# Patient Record
Sex: FEMALE | Race: WHITE | NOT HISPANIC OR LATINO
[De-identification: names, ages, dates, MRNs, and addresses within clinical notes are randomized per-mention and may not be internally consistent; named-entity substitution may affect disease eponyms.]

---

## 2017-02-16 ENCOUNTER — APPOINTMENT (OUTPATIENT)
Dept: CARDIOLOGY | Facility: CLINIC | Age: 72
End: 2017-02-16

## 2017-02-16 VITALS
DIASTOLIC BLOOD PRESSURE: 74 MMHG | HEIGHT: 67 IN | WEIGHT: 266 LBS | BODY MASS INDEX: 41.75 KG/M2 | SYSTOLIC BLOOD PRESSURE: 118 MMHG | HEART RATE: 89 BPM

## 2017-03-28 ENCOUNTER — APPOINTMENT (OUTPATIENT)
Dept: CARDIOLOGY | Facility: CLINIC | Age: 72
End: 2017-03-28

## 2017-05-16 ENCOUNTER — APPOINTMENT (OUTPATIENT)
Dept: CARDIOLOGY | Facility: CLINIC | Age: 72
End: 2017-05-16

## 2017-05-26 ENCOUNTER — MEDICATION RENEWAL (OUTPATIENT)
Age: 72
End: 2017-05-26

## 2017-07-18 ENCOUNTER — APPOINTMENT (OUTPATIENT)
Dept: CARDIOLOGY | Facility: CLINIC | Age: 72
End: 2017-07-18

## 2017-07-18 VITALS
BODY MASS INDEX: 41.59 KG/M2 | SYSTOLIC BLOOD PRESSURE: 128 MMHG | HEART RATE: 75 BPM | DIASTOLIC BLOOD PRESSURE: 76 MMHG | WEIGHT: 265 LBS | HEIGHT: 67 IN

## 2017-10-06 ENCOUNTER — OUTPATIENT (OUTPATIENT)
Dept: OUTPATIENT SERVICES | Facility: HOSPITAL | Age: 72
LOS: 1 days | Discharge: HOME | End: 2017-10-06

## 2017-10-06 DIAGNOSIS — Z13.21 ENCOUNTER FOR SCREENING FOR NUTRITIONAL DISORDER: ICD-10-CM

## 2017-10-06 DIAGNOSIS — R74.0 NONSPECIFIC ELEVATION OF LEVELS OF TRANSAMINASE AND LACTIC ACID DEHYDROGENASE [LDH]: ICD-10-CM

## 2017-10-06 DIAGNOSIS — R07.9 CHEST PAIN, UNSPECIFIED: ICD-10-CM

## 2017-10-06 DIAGNOSIS — E05.90 THYROTOXICOSIS, UNSPECIFIED WITHOUT THYROTOXIC CRISIS OR STORM: ICD-10-CM

## 2017-10-06 DIAGNOSIS — E11.9 TYPE 2 DIABETES MELLITUS WITHOUT COMPLICATIONS: ICD-10-CM

## 2017-10-06 DIAGNOSIS — E78.00 PURE HYPERCHOLESTEROLEMIA, UNSPECIFIED: ICD-10-CM

## 2017-10-06 DIAGNOSIS — R10.12 LEFT UPPER QUADRANT PAIN: ICD-10-CM

## 2017-10-06 DIAGNOSIS — Z00.00 ENCOUNTER FOR GENERAL ADULT MEDICAL EXAMINATION WITHOUT ABNORMAL FINDINGS: ICD-10-CM

## 2017-10-06 DIAGNOSIS — G93.3 POSTVIRAL AND RELATED FATIGUE SYNDROMES: ICD-10-CM

## 2017-10-06 DIAGNOSIS — D64.9 ANEMIA, UNSPECIFIED: ICD-10-CM

## 2017-10-06 DIAGNOSIS — D51.0 VITAMIN B12 DEFICIENCY ANEMIA DUE TO INTRINSIC FACTOR DEFICIENCY: ICD-10-CM

## 2017-10-06 DIAGNOSIS — R10.9 UNSPECIFIED ABDOMINAL PAIN: ICD-10-CM

## 2017-10-06 DIAGNOSIS — E55.9 VITAMIN D DEFICIENCY, UNSPECIFIED: ICD-10-CM

## 2018-01-11 ENCOUNTER — APPOINTMENT (OUTPATIENT)
Dept: CARDIOLOGY | Facility: CLINIC | Age: 73
End: 2018-01-11

## 2018-03-27 ENCOUNTER — OUTPATIENT (OUTPATIENT)
Dept: OUTPATIENT SERVICES | Facility: HOSPITAL | Age: 73
LOS: 1 days | Discharge: HOME | End: 2018-03-27

## 2018-03-27 DIAGNOSIS — D47.2 MONOCLONAL GAMMOPATHY: ICD-10-CM

## 2018-03-27 DIAGNOSIS — D64.9 ANEMIA, UNSPECIFIED: ICD-10-CM

## 2018-03-27 DIAGNOSIS — N18.4 CHRONIC KIDNEY DISEASE, STAGE 4 (SEVERE): ICD-10-CM

## 2018-03-27 DIAGNOSIS — R80.1 PERSISTENT PROTEINURIA, UNSPECIFIED: ICD-10-CM

## 2018-05-24 ENCOUNTER — APPOINTMENT (OUTPATIENT)
Dept: CARDIOLOGY | Facility: CLINIC | Age: 73
End: 2018-05-24

## 2018-07-12 ENCOUNTER — APPOINTMENT (OUTPATIENT)
Dept: CARDIOLOGY | Facility: CLINIC | Age: 73
End: 2018-07-12

## 2018-08-15 ENCOUNTER — APPOINTMENT (OUTPATIENT)
Dept: CARDIOLOGY | Facility: CLINIC | Age: 73
End: 2018-08-15

## 2018-08-15 VITALS
HEIGHT: 67 IN | BODY MASS INDEX: 42.69 KG/M2 | HEART RATE: 84 BPM | DIASTOLIC BLOOD PRESSURE: 82 MMHG | WEIGHT: 272 LBS | SYSTOLIC BLOOD PRESSURE: 140 MMHG

## 2018-12-13 ENCOUNTER — APPOINTMENT (OUTPATIENT)
Dept: CARDIOLOGY | Facility: CLINIC | Age: 73
End: 2018-12-13

## 2019-03-22 ENCOUNTER — OUTPATIENT (OUTPATIENT)
Dept: OUTPATIENT SERVICES | Facility: HOSPITAL | Age: 74
LOS: 1 days | Discharge: HOME | End: 2019-03-22

## 2019-03-22 DIAGNOSIS — D64.9 ANEMIA, UNSPECIFIED: ICD-10-CM

## 2019-03-22 DIAGNOSIS — D50.9 IRON DEFICIENCY ANEMIA, UNSPECIFIED: ICD-10-CM

## 2019-03-25 ENCOUNTER — OUTPATIENT (OUTPATIENT)
Dept: OUTPATIENT SERVICES | Facility: HOSPITAL | Age: 74
LOS: 1 days | Discharge: HOME | End: 2019-03-25

## 2019-03-25 DIAGNOSIS — E87.5 HYPERKALEMIA: ICD-10-CM

## 2019-04-02 ENCOUNTER — OUTPATIENT (OUTPATIENT)
Dept: OUTPATIENT SERVICES | Facility: HOSPITAL | Age: 74
LOS: 1 days | Discharge: HOME | End: 2019-04-02

## 2019-04-02 DIAGNOSIS — N18.2 CHRONIC KIDNEY DISEASE, STAGE 2 (MILD): ICD-10-CM

## 2019-04-09 ENCOUNTER — OUTPATIENT (OUTPATIENT)
Dept: OUTPATIENT SERVICES | Facility: HOSPITAL | Age: 74
LOS: 1 days | Discharge: HOME | End: 2019-04-09

## 2019-04-09 DIAGNOSIS — N18.2 CHRONIC KIDNEY DISEASE, STAGE 2 (MILD): ICD-10-CM

## 2019-04-23 ENCOUNTER — OUTPATIENT (OUTPATIENT)
Dept: OUTPATIENT SERVICES | Facility: HOSPITAL | Age: 74
LOS: 1 days | Discharge: HOME | End: 2019-04-23

## 2019-04-23 DIAGNOSIS — N18.2 CHRONIC KIDNEY DISEASE, STAGE 2 (MILD): ICD-10-CM

## 2019-05-21 ENCOUNTER — OUTPATIENT (OUTPATIENT)
Dept: OUTPATIENT SERVICES | Facility: HOSPITAL | Age: 74
LOS: 1 days | Discharge: HOME | End: 2019-05-21

## 2019-05-21 DIAGNOSIS — R79.82 ELEVATED C-REACTIVE PROTEIN (CRP): ICD-10-CM

## 2019-05-21 DIAGNOSIS — R74.8 ABNORMAL LEVELS OF OTHER SERUM ENZYMES: ICD-10-CM

## 2019-09-01 ENCOUNTER — EMERGENCY (EMERGENCY)
Facility: HOSPITAL | Age: 74
LOS: 0 days | Discharge: HOME | End: 2019-09-01
Attending: EMERGENCY MEDICINE | Admitting: EMERGENCY MEDICINE
Payer: MEDICARE

## 2019-09-01 VITALS
DIASTOLIC BLOOD PRESSURE: 68 MMHG | OXYGEN SATURATION: 99 % | TEMPERATURE: 96 F | RESPIRATION RATE: 18 BRPM | HEIGHT: 67 IN | SYSTOLIC BLOOD PRESSURE: 159 MMHG | HEART RATE: 73 BPM | WEIGHT: 220.02 LBS

## 2019-09-01 VITALS
TEMPERATURE: 96 F | DIASTOLIC BLOOD PRESSURE: 74 MMHG | HEART RATE: 82 BPM | RESPIRATION RATE: 18 BRPM | OXYGEN SATURATION: 99 % | SYSTOLIC BLOOD PRESSURE: 148 MMHG

## 2019-09-01 DIAGNOSIS — R10.11 RIGHT UPPER QUADRANT PAIN: ICD-10-CM

## 2019-09-01 DIAGNOSIS — Z91.040 LATEX ALLERGY STATUS: ICD-10-CM

## 2019-09-01 DIAGNOSIS — Z87.19 PERSONAL HISTORY OF OTHER DISEASES OF THE DIGESTIVE SYSTEM: Chronic | ICD-10-CM

## 2019-09-01 DIAGNOSIS — I10 ESSENTIAL (PRIMARY) HYPERTENSION: ICD-10-CM

## 2019-09-01 DIAGNOSIS — R07.9 CHEST PAIN, UNSPECIFIED: ICD-10-CM

## 2019-09-01 DIAGNOSIS — Z88.0 ALLERGY STATUS TO PENICILLIN: ICD-10-CM

## 2019-09-01 DIAGNOSIS — R10.13 EPIGASTRIC PAIN: ICD-10-CM

## 2019-09-01 LAB
ALBUMIN SERPL ELPH-MCNC: 3.9 G/DL — SIGNIFICANT CHANGE UP (ref 3.5–5.2)
ALP SERPL-CCNC: 89 U/L — SIGNIFICANT CHANGE UP (ref 30–115)
ALT FLD-CCNC: 9 U/L — SIGNIFICANT CHANGE UP (ref 0–41)
ANION GAP SERPL CALC-SCNC: 11 MMOL/L — SIGNIFICANT CHANGE UP (ref 7–14)
APTT BLD: 35 SEC — SIGNIFICANT CHANGE UP (ref 27–39.2)
AST SERPL-CCNC: 20 U/L — SIGNIFICANT CHANGE UP (ref 0–41)
BASOPHILS # BLD AUTO: 0.01 K/UL — SIGNIFICANT CHANGE UP (ref 0–0.2)
BASOPHILS NFR BLD AUTO: 0.2 % — SIGNIFICANT CHANGE UP (ref 0–1)
BILIRUB SERPL-MCNC: 0.5 MG/DL — SIGNIFICANT CHANGE UP (ref 0.2–1.2)
BUN SERPL-MCNC: 26 MG/DL — HIGH (ref 10–20)
CALCIUM SERPL-MCNC: 9.1 MG/DL — SIGNIFICANT CHANGE UP (ref 8.5–10.1)
CHLORIDE SERPL-SCNC: 108 MMOL/L — SIGNIFICANT CHANGE UP (ref 98–110)
CO2 SERPL-SCNC: 22 MMOL/L — SIGNIFICANT CHANGE UP (ref 17–32)
CREAT SERPL-MCNC: 1.8 MG/DL — HIGH (ref 0.7–1.5)
EOSINOPHIL # BLD AUTO: 0.21 K/UL — SIGNIFICANT CHANGE UP (ref 0–0.7)
EOSINOPHIL NFR BLD AUTO: 5.2 % — SIGNIFICANT CHANGE UP (ref 0–8)
GLUCOSE SERPL-MCNC: 125 MG/DL — HIGH (ref 70–99)
HCT VFR BLD CALC: 36.1 % — LOW (ref 37–47)
HGB BLD-MCNC: 11.2 G/DL — LOW (ref 12–16)
IMM GRANULOCYTES NFR BLD AUTO: 0.2 % — SIGNIFICANT CHANGE UP (ref 0.1–0.3)
INR BLD: 1.06 RATIO — SIGNIFICANT CHANGE UP (ref 0.65–1.3)
LIDOCAIN IGE QN: 60 U/L — SIGNIFICANT CHANGE UP (ref 7–60)
LYMPHOCYTES # BLD AUTO: 1.01 K/UL — LOW (ref 1.2–3.4)
LYMPHOCYTES # BLD AUTO: 24.9 % — SIGNIFICANT CHANGE UP (ref 20.5–51.1)
MAGNESIUM SERPL-MCNC: 1.9 MG/DL — SIGNIFICANT CHANGE UP (ref 1.8–2.4)
MCHC RBC-ENTMCNC: 25.5 PG — LOW (ref 27–31)
MCHC RBC-ENTMCNC: 31 G/DL — LOW (ref 32–37)
MCV RBC AUTO: 82 FL — SIGNIFICANT CHANGE UP (ref 81–99)
MONOCYTES # BLD AUTO: 0.49 K/UL — SIGNIFICANT CHANGE UP (ref 0.1–0.6)
MONOCYTES NFR BLD AUTO: 12.1 % — HIGH (ref 1.7–9.3)
NEUTROPHILS # BLD AUTO: 2.33 K/UL — SIGNIFICANT CHANGE UP (ref 1.4–6.5)
NEUTROPHILS NFR BLD AUTO: 57.4 % — SIGNIFICANT CHANGE UP (ref 42.2–75.2)
NRBC # BLD: 0 /100 WBCS — SIGNIFICANT CHANGE UP (ref 0–0)
PLATELET # BLD AUTO: 136 K/UL — SIGNIFICANT CHANGE UP (ref 130–400)
POTASSIUM SERPL-MCNC: 4.6 MMOL/L — SIGNIFICANT CHANGE UP (ref 3.5–5)
POTASSIUM SERPL-SCNC: 4.6 MMOL/L — SIGNIFICANT CHANGE UP (ref 3.5–5)
PROT SERPL-MCNC: 6.8 G/DL — SIGNIFICANT CHANGE UP (ref 6–8)
PROTHROM AB SERPL-ACNC: 12.2 SEC — SIGNIFICANT CHANGE UP (ref 9.95–12.87)
RBC # BLD: 4.4 M/UL — SIGNIFICANT CHANGE UP (ref 4.2–5.4)
RBC # FLD: 14.9 % — HIGH (ref 11.5–14.5)
SODIUM SERPL-SCNC: 141 MMOL/L — SIGNIFICANT CHANGE UP (ref 135–146)
TROPONIN T SERPL-MCNC: <0.01 NG/ML — SIGNIFICANT CHANGE UP
WBC # BLD: 4.06 K/UL — LOW (ref 4.8–10.8)
WBC # FLD AUTO: 4.06 K/UL — LOW (ref 4.8–10.8)

## 2019-09-01 PROCEDURE — 71045 X-RAY EXAM CHEST 1 VIEW: CPT | Mod: 26

## 2019-09-01 PROCEDURE — 99284 EMERGENCY DEPT VISIT MOD MDM: CPT

## 2019-09-01 RX ORDER — ACETAMINOPHEN 500 MG
975 TABLET ORAL ONCE
Refills: 0 | Status: COMPLETED | OUTPATIENT
Start: 2019-09-01 | End: 2019-09-01

## 2019-09-01 RX ADMIN — Medication 975 MILLIGRAM(S): at 14:03

## 2019-09-01 NOTE — ED ADULT TRIAGE NOTE - CHIEF COMPLAINT QUOTE
pt c/o midsternal CP and epigastric pain for days, denies sob, denies n/v, describes pain as "cramping/pressure". pt reports waking up diaphoretic.

## 2019-09-01 NOTE — ED PROVIDER NOTE - PROVIDER TOKENS
PROVIDER:[TOKEN:[[id number]],FOLLOWUP:[1-3 Days]],PROVIDER:[TOKEN:[[id number]],FOLLOWUP:[1-3 Days]]

## 2019-09-01 NOTE — ED PROVIDER NOTE - PATIENT PORTAL LINK FT
You can access the FollowMyHealth Patient Portal offered by [facility] by registering at the following website: [email or web address]. By joining [employer]’s FollowMyHealth portal, you will also be able to view your health information using other applications (apps) compatible with our system.

## 2019-09-01 NOTE — ED PROVIDER NOTE - ATTENDING CONTRIBUTION TO CARE
Pt is a 74yo female with 1 month of epigastric tightness that is random and episodic, unrelated to exertion or diet.  No radiation.  No SOB or diaphoresis.  No LE edema or calf pain.  Had endoscopy 2 weeks ago.      Exam: RRR, CTAB, 2+ raidal pulses, soft NT abdomen, no rash, no LE edema, no calf tenderenss  Imp: epigastric discomfort  Plan: labs, xr, ekg, cardio/gi f/u

## 2019-09-01 NOTE — ED PROVIDER NOTE - OBJECTIVE STATEMENT
74 y/o female presents with epigastric pain radiating to jaw intermittent x 2 weeks. patient s/p endoscopy 2 weeks ago. patient was told to continue prilosec. patient follows up with cardiology 3 months ago. patient former smoker. patient denies any sob, syncope, back pain, upper chest pain, arm radiation. patient c/o gnawing pain not worsened by eating or lying back. patient without any leg edema or swelling.

## 2019-09-01 NOTE — ED ADULT NURSE NOTE - NSIMPLEMENTINTERV_GEN_ALL_ED
Implemented All Universal Safety Interventions:  [place] to call system. Call bell, personal items and telephone within reach. Instruct patient to call for assistance. Room bathroom lighting operational. Non-slip footwear when patient is off stretcher. Physically safe environment: no spills, clutter or unnecessary equipment. Stretcher in lowest position, wheels locked, appropriate side rails in place.

## 2019-09-01 NOTE — ED PROVIDER NOTE - CARE PROVIDER_API CALL
Munir Paula (MD)  Cardiovascular Disease; Internal Medicine; Interventional Cardiology  [address] [place] [address], [place] [address]  Phone: [phone]  Fax: [phone]  Follow Up Time: 1-3 Days    Prudencio Robles ()  Gastroenterology  [address] [place] [address]  Phone: [phone]  Fax: [phone]  Follow Up Time: 1-3 Days

## 2019-10-18 ENCOUNTER — INPATIENT (INPATIENT)
Facility: HOSPITAL | Age: 74
LOS: 3 days | Discharge: HOME | End: 2019-10-22
Attending: INTERNAL MEDICINE | Admitting: INTERNAL MEDICINE
Payer: MEDICARE

## 2019-10-18 VITALS
SYSTOLIC BLOOD PRESSURE: 144 MMHG | RESPIRATION RATE: 18 BRPM | HEART RATE: 89 BPM | DIASTOLIC BLOOD PRESSURE: 59 MMHG | OXYGEN SATURATION: 100 % | TEMPERATURE: 97 F

## 2019-10-18 DIAGNOSIS — Z87.19 PERSONAL HISTORY OF OTHER DISEASES OF THE DIGESTIVE SYSTEM: Chronic | ICD-10-CM

## 2019-10-18 PROBLEM — I10 ESSENTIAL (PRIMARY) HYPERTENSION: Chronic | Status: ACTIVE | Noted: 2019-09-01

## 2019-10-18 PROBLEM — L30.9 DERMATITIS, UNSPECIFIED: Chronic | Status: ACTIVE | Noted: 2019-09-01

## 2019-10-18 PROBLEM — R01.1 CARDIAC MURMUR, UNSPECIFIED: Chronic | Status: ACTIVE | Noted: 2019-09-01

## 2019-10-18 LAB
ABO RH CONFIRMATION: SIGNIFICANT CHANGE UP
ALBUMIN SERPL ELPH-MCNC: 3.8 G/DL — SIGNIFICANT CHANGE UP (ref 3.5–5.2)
ALP SERPL-CCNC: 90 U/L — SIGNIFICANT CHANGE UP (ref 30–115)
ALT FLD-CCNC: 6 U/L — SIGNIFICANT CHANGE UP (ref 0–41)
ANION GAP SERPL CALC-SCNC: 11 MMOL/L — SIGNIFICANT CHANGE UP (ref 7–14)
APTT BLD: 33.3 SEC — SIGNIFICANT CHANGE UP (ref 27–39.2)
AST SERPL-CCNC: 17 U/L — SIGNIFICANT CHANGE UP (ref 0–41)
BASE EXCESS BLDV CALC-SCNC: -3.5 MMOL/L — LOW (ref -2–2)
BASOPHILS # BLD AUTO: 0.01 K/UL — SIGNIFICANT CHANGE UP (ref 0–0.2)
BASOPHILS NFR BLD AUTO: 0.4 % — SIGNIFICANT CHANGE UP (ref 0–1)
BILIRUB DIRECT SERPL-MCNC: <0.2 MG/DL — SIGNIFICANT CHANGE UP (ref 0–0.2)
BILIRUB INDIRECT FLD-MCNC: >0.3 MG/DL — SIGNIFICANT CHANGE UP (ref 0.2–1.2)
BILIRUB SERPL-MCNC: 0.5 MG/DL — SIGNIFICANT CHANGE UP (ref 0.2–1.2)
BLD GP AB SCN SERPL QL: SIGNIFICANT CHANGE UP
BUN SERPL-MCNC: 30 MG/DL — HIGH (ref 10–20)
CA-I SERPL-SCNC: 1.19 MMOL/L — SIGNIFICANT CHANGE UP (ref 1.12–1.3)
CALCIUM SERPL-MCNC: 8.5 MG/DL — SIGNIFICANT CHANGE UP (ref 8.5–10.1)
CHLORIDE SERPL-SCNC: 109 MMOL/L — SIGNIFICANT CHANGE UP (ref 98–110)
CO2 SERPL-SCNC: 20 MMOL/L — SIGNIFICANT CHANGE UP (ref 17–32)
CREAT SERPL-MCNC: 2 MG/DL — HIGH (ref 0.7–1.5)
EOSINOPHIL # BLD AUTO: 0.05 K/UL — SIGNIFICANT CHANGE UP (ref 0–0.7)
EOSINOPHIL NFR BLD AUTO: 1.9 % — SIGNIFICANT CHANGE UP (ref 0–8)
GAS PNL BLDV: 141 MMOL/L — SIGNIFICANT CHANGE UP (ref 136–145)
GAS PNL BLDV: SIGNIFICANT CHANGE UP
GLUCOSE BLDC GLUCOMTR-MCNC: 78 MG/DL — SIGNIFICANT CHANGE UP (ref 70–99)
GLUCOSE SERPL-MCNC: 101 MG/DL — HIGH (ref 70–99)
HCO3 BLDV-SCNC: 21 MMOL/L — LOW (ref 22–29)
HCT VFR BLD CALC: 18.2 % — LOW (ref 37–47)
HCT VFR BLDA CALC: 16.2 % — LOW (ref 34–44)
HGB BLD CALC-MCNC: 5.3 G/DL — LOW (ref 14–18)
HGB BLD-MCNC: 5.5 G/DL — CRITICAL LOW (ref 12–16)
IMM GRANULOCYTES NFR BLD AUTO: 0.4 % — HIGH (ref 0.1–0.3)
INR BLD: 1.03 RATIO — SIGNIFICANT CHANGE UP (ref 0.65–1.3)
INR BLD: 1.1 RATIO — SIGNIFICANT CHANGE UP (ref 0.65–1.3)
LACTATE BLDV-MCNC: 1.4 MMOL/L — SIGNIFICANT CHANGE UP (ref 0.5–1.6)
LACTATE SERPL-SCNC: 1.6 MMOL/L — SIGNIFICANT CHANGE UP (ref 0.5–2.2)
LIDOCAIN IGE QN: 56 U/L — SIGNIFICANT CHANGE UP (ref 7–60)
LYMPHOCYTES # BLD AUTO: 0.5 K/UL — LOW (ref 1.2–3.4)
LYMPHOCYTES # BLD AUTO: 18.7 % — LOW (ref 20.5–51.1)
MCHC RBC-ENTMCNC: 24.4 PG — LOW (ref 27–31)
MCHC RBC-ENTMCNC: 30.2 G/DL — LOW (ref 32–37)
MCV RBC AUTO: 80.9 FL — LOW (ref 81–99)
MONOCYTES # BLD AUTO: 0.17 K/UL — SIGNIFICANT CHANGE UP (ref 0.1–0.6)
MONOCYTES NFR BLD AUTO: 6.4 % — SIGNIFICANT CHANGE UP (ref 1.7–9.3)
NEUTROPHILS # BLD AUTO: 1.93 K/UL — SIGNIFICANT CHANGE UP (ref 1.4–6.5)
NEUTROPHILS NFR BLD AUTO: 72.2 % — SIGNIFICANT CHANGE UP (ref 42.2–75.2)
NRBC # BLD: 0 /100 WBCS — SIGNIFICANT CHANGE UP (ref 0–0)
NT-PROBNP SERPL-SCNC: 1017 PG/ML — HIGH (ref 0–300)
PCO2 BLDV: 37 MMHG — LOW (ref 41–51)
PH BLDV: 7.37 — SIGNIFICANT CHANGE UP (ref 7.26–7.43)
PLATELET # BLD AUTO: 148 K/UL — SIGNIFICANT CHANGE UP (ref 130–400)
PO2 BLDV: 34 MMHG — SIGNIFICANT CHANGE UP (ref 20–40)
POTASSIUM BLDV-SCNC: 5.2 MMOL/L — SIGNIFICANT CHANGE UP (ref 3.3–5.6)
POTASSIUM SERPL-MCNC: 5.4 MMOL/L — HIGH (ref 3.5–5)
POTASSIUM SERPL-SCNC: 5.4 MMOL/L — HIGH (ref 3.5–5)
PROT SERPL-MCNC: 6.9 G/DL — SIGNIFICANT CHANGE UP (ref 6–8)
PROTHROM AB SERPL-ACNC: 11.8 SEC — SIGNIFICANT CHANGE UP (ref 9.95–12.87)
PROTHROM AB SERPL-ACNC: 12.6 SEC — SIGNIFICANT CHANGE UP (ref 9.95–12.87)
RBC # BLD: 2.25 M/UL — LOW (ref 4.2–5.4)
RBC # FLD: 15.4 % — HIGH (ref 11.5–14.5)
SAO2 % BLDV: 63 % — SIGNIFICANT CHANGE UP
SODIUM SERPL-SCNC: 140 MMOL/L — SIGNIFICANT CHANGE UP (ref 135–146)
TROPONIN T SERPL-MCNC: <0.01 NG/ML — SIGNIFICANT CHANGE UP
WBC # BLD: 4.54 K/UL — LOW (ref 4.8–10.8)
WBC # FLD AUTO: 4.54 K/UL — LOW (ref 4.8–10.8)

## 2019-10-18 PROCEDURE — 93010 ELECTROCARDIOGRAM REPORT: CPT

## 2019-10-18 PROCEDURE — 93970 EXTREMITY STUDY: CPT | Mod: 26

## 2019-10-18 PROCEDURE — 99285 EMERGENCY DEPT VISIT HI MDM: CPT

## 2019-10-18 PROCEDURE — 71045 X-RAY EXAM CHEST 1 VIEW: CPT | Mod: 26

## 2019-10-18 RX ORDER — PANTOPRAZOLE SODIUM 20 MG/1
80 TABLET, DELAYED RELEASE ORAL ONCE
Refills: 0 | Status: COMPLETED | OUTPATIENT
Start: 2019-10-18 | End: 2019-10-18

## 2019-10-18 RX ORDER — FAMOTIDINE 10 MG/ML
20 INJECTION INTRAVENOUS ONCE
Refills: 0 | Status: COMPLETED | OUTPATIENT
Start: 2019-10-18 | End: 2019-10-18

## 2019-10-18 RX ORDER — DEXTROSE 50 % IN WATER 50 %
50 SYRINGE (ML) INTRAVENOUS ONCE
Refills: 0 | Status: COMPLETED | OUTPATIENT
Start: 2019-10-18 | End: 2019-10-18

## 2019-10-18 RX ORDER — METOPROLOL TARTRATE 50 MG
25 TABLET ORAL DAILY
Refills: 0 | Status: DISCONTINUED | OUTPATIENT
Start: 2019-10-18 | End: 2019-10-22

## 2019-10-18 RX ORDER — ACETAMINOPHEN 500 MG
650 TABLET ORAL ONCE
Refills: 0 | Status: COMPLETED | OUTPATIENT
Start: 2019-10-18 | End: 2019-10-18

## 2019-10-18 RX ORDER — INSULIN HUMAN 100 [IU]/ML
10 INJECTION, SOLUTION SUBCUTANEOUS ONCE
Refills: 0 | Status: COMPLETED | OUTPATIENT
Start: 2019-10-18 | End: 2019-10-18

## 2019-10-18 RX ORDER — PANTOPRAZOLE SODIUM 20 MG/1
8 TABLET, DELAYED RELEASE ORAL
Qty: 80 | Refills: 0 | Status: DISCONTINUED | OUTPATIENT
Start: 2019-10-18 | End: 2019-10-20

## 2019-10-18 RX ADMIN — INSULIN HUMAN 10 UNIT(S): 100 INJECTION, SOLUTION SUBCUTANEOUS at 22:24

## 2019-10-18 RX ADMIN — PANTOPRAZOLE SODIUM 80 MILLIGRAM(S): 20 TABLET, DELAYED RELEASE ORAL at 18:10

## 2019-10-18 RX ADMIN — PANTOPRAZOLE SODIUM 10 MG/HR: 20 TABLET, DELAYED RELEASE ORAL at 18:10

## 2019-10-18 RX ADMIN — Medication 50 MILLILITER(S): at 22:24

## 2019-10-18 RX ADMIN — Medication 650 MILLIGRAM(S): at 15:22

## 2019-10-18 RX ADMIN — FAMOTIDINE 20 MILLIGRAM(S): 10 INJECTION INTRAVENOUS at 15:22

## 2019-10-18 NOTE — ED PROVIDER NOTE - OBJECTIVE STATEMENT
Patient is a 74 yo F w/ hx of gout, anemia, rheumatoid arthritis, PUD, HTN, Aortic stenosis, CAD p/w SOB. Patient had few episodes of BRBPR followed by dark stools four days prior to presentation after using colchicine for gout flair; frequently uses prednisone for arthritis. Patient states she has had exertional SOB, also orthopnea x 5 days associated with epigastric burning pain radiating up her chest and to her back- sensation is constant. Endorses RLE swelling and pain- no hx of blood clots. Patient referred to ED by PMD for possible GI bleed. Patient's GI is Dr. Robles. No recent travel; no recent surgery.

## 2019-10-18 NOTE — ED ADULT NURSE NOTE - NSIMPLEMENTINTERV_GEN_ALL_ED
Implemented All Universal Safety Interventions:  [place] to call system. Call bell, personal items and telephone within reach. Instruct patient to call for assistance. Room bathroom lighting operational. Non-slip footwear when patient is off stretcher. Physically safe environment: no spills, clutter or unnecessary equipment. Stretcher in lowest position, wheels locked, appropriate side rails in place.

## 2019-10-18 NOTE — ED PROVIDER NOTE - PROGRESS NOTE DETAILS
ATTENDING NOTE: I personally evaluated the patient. I reviewed the Resident’s note (as assigned above), and agree with the findings and plan except as documented in my note.   72 y/o M with PMH of gout on Cortisol and occasional Prednisone sent in for exertional dyspnea and black stool, rule our GI bleed. Pt additionally c/o R LE swelling x 10 days.  Pt non-toxic well appearing, pallor, MMM. No respiratory distress. Lungs CTAB. RRR. Abdomen soft. Black stool noted on rectal exam. R calf tense and tender, trace edema.   Labs imagine reassess. Authored by Dr. Jensen: s/o to dr vieira - f/u labs, imaging and dispo GI fellow and Dr. Cartagena bedside; will admit patient to floor for serial CBC. Possible endoscopy inpatient.  Patient consented for transfusion. Prelim read for DVT study is negative.

## 2019-10-18 NOTE — H&P ADULT - HISTORY OF PRESENT ILLNESS
74 Y/O F w/ hx of gout, anemia, rheumatoid arthritis, PUD, HTN, Aortic stenosis, CAD p/w SOB. Patient had few episodes of BRBPR followed by dark stools four days prior to presentation after using colchicine for gout flair; frequently uses prednisone for arthritis. Patient states she has had exertional SOB, also orthopnea x 5 days associated with epigastric burning pain radiating up her chest and to her back- sensation is constant. Endorses RLE swelling and pain- no hx of blood clots. Patient referred to ED by PMD for possible GI bleed. Patient's GI is Dr. Robels. No recent travel; no recent surgery. 74 Y/O F w/ hx of gout, anemia, rheumatoid arthritis, PUD, HTN, Aortic stenosis, CAD p/w SOB. Patient had few episodes of BRBPR followed by dark stools four days prior to presentation after using colchicine for gout flair; frequently uses prednisone for arthritis. Patient states she has had exertional SOB, also orthopnea x 5 days associated with epigastric burning pain radiating up her chest and to her back- sensation is constant. Endorses RLE swelling and pain- no hx of blood clots. Patient referred to ED by PMD for possible GI bleed. Patient's GI is Dr. Robles. No recent travel; no recent surgery.    In the ED, CXR was done which was negative for any cardiopulmonary disease. Troponin negative. Her HB dropped to 5.5, GI was consulted and patient received 3 units of PRBC.  Patient was started on clear liquid diet.    On my examination patient is resting comfortably with no active distress. 72 Y/O F w/ hx of gout, anemia, rheumatoid arthritis, PUD, HTN, Aortic stenosis, CAD p/w SOB. Patient had few episodes of BRBPR followed by dark stools four days prior to presentation after using colchicine for gout flair; frequently uses prednisone for arthritis. Patient states she has had exertional SOB, also orthopnea x 5 days associated with epigastric burning pain radiating up her chest and to her back- sensation is constant. Endorses RLE swelling and pain- no hx of blood clots. Patient referred to ED by PMD for possible GI bleed. Patient's GI is Dr. Robles. No recent travel; no recent surgery.    In the ED, CXR was done which was negative for any cardiopulmonary disease. Troponin negative. Her HB dropped to 5.5, GI was consulted and patient received 3 units of PRBC.  Patient was started on clear liquid diet and Protonix drip    On my examination patient is resting comfortably with no active distress. 74 Y/O F w/ hx of gout, anemia, rheumatoid arthritis, PUD, HTN, Aortic stenosis, CAD p/w SOB after few episodes of BRBPR followed by dark stools four days prior to presentation Patient states she has had exertional SOB, also orthopnea x 5 days associated with epigastric burning pain radiating up her chest and to her back- sensation is constant. Endorses RLE swelling and pain- no hx of blood clots. Patient referred to ED by PMD for possible GI bleed. Patient's GI is Dr. Robles. No recent travel; no recent surgery.    In the ED, CXR was done which was negative for any cardiopulmonary disease. Troponin negative. Her HB dropped to 5.5, GI was consulted and 3 units of PRBC. were ordered.  Patient was started on clear liquid diet and Protonix drip    On my examination patient is resting comfortably with no active distress. 72 Y/O F w/ hx of gout, anemia, rheumatoid arthritis, PUD, HTN, Aortic stenosis, CAD p/w SOB started 5 days ago after few episodes of BRBPR followed by dark stools four days prior to presentation Patient states she has had exertional SOB, also orthopnea x 5 days associated with epigastric burning pain radiating up her chest and to her back- sensation is constant. Endorses RLE swelling and pain- no hx of blood clots. Patient referred to ED by PMD for possible GI bleed. Patient's GI is Dr. Robles. No recent travel; no recent surgery.    In the ED, CXR was done which was negative for any cardiopulmonary disease. Troponin negative. Her HB dropped to 5.5, GI was consulted and 3 units of PRBC. were ordered.  Patient was started on clear liquid diet and Protonix drip    On my examination patient is resting comfortably with no active distress.

## 2019-10-18 NOTE — H&P ADULT - ATTENDING COMMENTS
A 72 yo female with PMH of HTN, CAD, rheumatoid arthritis, PUD, Aortic stenosis came to ED c/o SOB and dark stool, symptoms started 5 days ago with bright red stool then she noticed black stool and started with SOB and orthopnea, she denies chest pain, cough, fever or chills, she has no abdominal pain, no diarrhea.  In the ED, vital signs were stable, Labs showed Hb 5.4, CXR was clear, she received 3 RBCs and admitted to the hospital. Patient was started on clear liquid diet and Protonix drip    PHYSICAL EXAM:  GENERAL: NAD, well-developed  HEAD:  Atraumatic, Normocephalic  EYES: EOMI, PERRLA, conjunctiva and sclera clear  NECK: Supple, No JVD  CHEST/LUNG: Clear to auscultation bilaterally; No wheeze  HEART: Regular rate and rhythm; S1 S2  ABDOMEN: Soft, Nontender, Nondistended; Bowel sounds present  EXTREMITIES:  2+ Peripheral Pulses, No clubbing, cyanosis, or edema  PSYCH: AAOx3  NEUROLOGY: non-focal  SKIN: No rashes or lesions      A/P:   Acute blood loss anemia, likely from GI bleeding  Hb 5.5, baseline 11  s/p 3 RBCs transfusion, hemodynamically stable, monitor H/H every 12 hrs.     Gastrointestinal bleeding: likely upper.   Hx of PUD, chronic use of steroid for RA, likely another peptic ulcer disease  GI on board, plan for endoscopy on Monday or earlier if patient unstable.   Continue Pantoprazole infusion.    SOB: likely from acute anemia  Hx of AS, Pro-BNP mildly elevated  Caution with IV fluid and blood transfusion.     HTN:   Continue Metoprolol    CKD stage 3:   Cr 2.0 around baseline 1.8

## 2019-10-18 NOTE — ED PROVIDER NOTE - NS ED ROS FT
Constitutional: No fevers.   Eyes:  No visual changes, eye pain or discharge.  ENMT:  No hearing changes, pain, no sore throat or runny nose, no difficulty swallowing.  Cardiac:  +exertional SOB.   Respiratory: +SOB.   GI:  +black stools.   :  No dysuria, frequency or burning.  MS:  No myalgia, muscle weakness, joint pain or back pain.  Neuro:  No headache or weakness.  No LOC.  Skin:  No skin rash.   Endocrine: no hx of DM.

## 2019-10-18 NOTE — CONSULT NOTE ADULT - SUBJECTIVE AND OBJECTIVE BOX
GI HPI:  A 73 y old female patient with pmhx of Gout was taking colchicine and prednisone (the last 7 days), hx of gastritis as per pt had an EGD by Dr Robles in July this year now patient complaining of black stools since 1 week. As per patient she noticed black stools for the last 7 days that became lighter and dark brown the last 2 days. Pt also reports generalized weakness and SOB on mild exertion. Pt also reports epigastic burning pain, relflux. She denies any red blood per rectum, use of NSAIDS, or blood thinners.         Previous EGD: Nothing on system    Previous colonoscopy: nothing on system   PAST MEDICAL & SURGICAL HISTORY  Eczema  Heart murmur  HTN (hypertension)  H/O cholecystitis  H/O appendicitis        SOCIAL HISTORY:  smoker: non smoker  Alcohol: Non alcoholic  Drug: Denies use of drugs   FAMILY HISTORY:  FAMILY HISTORY:      ALLERGIES:  latex (Unknown)  penicillins (Unknown)      MEDICATIONS:  MEDICATIONS  (STANDING):  aluminum hydroxide/magnesium hydroxide/simethicone Suspension 30 milliLiter(s) Oral Once  pantoprazole  Injectable 80 milliGRAM(s) IV Push Once  pantoprazole Infusion 8 mG/Hr (10 mL/Hr) IV Continuous <Continuous>    MEDICATIONS  (PRN):      HOME MEDICATIONS:  Home Medications:      ROS:     REVIEW OF SYSTEMS  General:  No fevers  Eyes:  No reported pain   ENT:  No sore throat   NECK: No stiffness   CV:  No chest pain   Resp:  ++ shortness of breath  GI:  See HPI  :  No dysuria  Muscle:  +++weakness  Neuro:  No tingling  Endocrine:  No polyuria  Heme:  No ecchymosis          VITALS:   T(F): 97.2 (10-18 @ 13:23), Max: 97.2 (10-18 @ 13:23)  HR: 89 (10-18 @ 13:23) (89 - 89)  BP: 144/59 (10-18 @ 13:23) (144/59 - 144/59)  BP(mean): --  RR: 18 (10-18 @ 13:23) (18 - 18)  SpO2: 100% (10-18 @ 13:23) (100% - 100%)    I&O's Summary      PHYSICAL EXAM:  GENERAL:  Appears in no distress  HEENT:  Conjunctivae  anicteric  CHEST:  Full & symmetric excursion  HEART:  N S1, S2  ABDOMEN:  Soft, Mild epigastric tenderness , non-distended, no masses   EXTEREMITIES:  no  edema  SKIN:  No rash  NEURO:  Alert, No asterixis   rectal exam: Brown stools mixed with black       LABS:                        5.5    4.54  )-----------( 148      ( 18 Oct 2019 15:00 )             18.2     PT/INR - ( 18 Oct 2019 16:18 )  INR: 1.10          PTT - ( 18 Oct 2019 16:18 )  PTT:33.3   LIVER FUNCTIONS - ( 18 Oct 2019 15:00 )  Alb: 3.8 g/dL / Pro: 6.9 g/dL / ALK PHOS: 90 U/L / ALT: 6 U/L / AST: 17 U/L / GGT: x           10-18    140  |  109  |  30<H>  ----------------------------<  101<H>  5.4<H>   |  20  |  2.0<H>    Ca    8.5      18 Oct 2019 15:00      CARDIAC MARKERS ( 18 Oct 2019 15:00 )  x     / <0.01 ng/mL / x     / x     / x

## 2019-10-18 NOTE — ED ADULT NURSE NOTE - NS ED NOTE ABUSE RESPONSE YN
Primary osteoarthritis of right knee  06/15/2018    Active  Felton Joyce Unable to assess due to medical condition

## 2019-10-18 NOTE — H&P ADULT - ASSESSMENT
74 Y/O F w/ hx of gout, anemia, rheumatoid arthritis, PUD, HTN, Aortic stenosis, CAD p/w SOB. 72 Y/O F w/ hx of gout, anemia, rheumatoid arthritis, PUD, HTN, Aortic stenosis, CAD p/w SOB. 74 Y/O F w/ hx of gout, anemia, rheumatoid arthritis, PUD, HTN, Aortic stenosis, CAD p/w SOB and melena.    Melena /Upper GI bleed  -Has a long history of Gastritis and abdominal pain.  -EGD last in July 2019 showed Gastritis  -Initially had Bright red blood per rectum and then black stools.  -Rectal exam done by the ED showed black stools.  -Hb dropped to 5.5  -3 units of PRBC  -GI was consulted, primary GI is Dr. Driver  -Clear liquid Diet  -Protonix Drip  -H and H q8 hours  -Keep 2 IV 18G  -Avoid NSAID's  -Tentative EGD by Dr. Driver on Monday if Hb stays stable.  -If Hb drops or patient becomes unstable GI will do the EGD on weekends    Shortness of Breath  -CXR negative for any cardiopulmonary disease  -Most likely due to acute drop in Hb to 5.5 74 Y/O F w/ hx of gout, anemia, rheumatoid arthritis, PUD, HTN, Aortic stenosis, CAD p/w SOB and melena.    Melena /Upper GI bleed/h/o pud years ago  -Has a long history of Gastritis and abdominal pain.  -EGD last in July 2019 showed Gastritis  -Initially had Bright red blood per rectum and then black stools.  -Rectal exam done by the ED showed black stools.  -Hb dropped to 5.5  -3 units of PRBC  -GI was consulted, primary GI is Dr. Driver  -Clear liquid Diet  -Protonix Drip  -H and H q8 hours  -Keep 2 IV 18G  -Avoid NSAID's  -Tentative EGD by Dr. Driver on Monday if Hb stays stable.  -If Hb drops or patient becomes unstable GI will do the EGD on weekends    Shortness of Breath  -CXR negative for any cardiopulmonary disease  -Most likely due to acute drop in Hb to 5.5  -Was also having orthopnea since last 5 days.  -Will get ECHO  -H/O Aortic Stenosis  -BNP 1017 (Patient obese)    Gout  -Stable  -Takes colchicine and prednisone PRN    HTN	  -Metoprolol succinate 25mg daily    Diet - DASH  DVT - Sequential(GI bleed)  GI -PPI Drip  Activity - Increase as tolerated  FULL CODE	    Call the [facility] in AM and complete the MEDREC 72 Y/O F w/ hx of gout, anemia, rheumatoid arthritis, PUD, HTN, Aortic stenosis, CAD p/w SOB and melena.    Melena /Upper GI bleed/h/o pud years ago  -Has a long history of Gastritis and abdominal pain.  -EGD last in July 2019 showed Gastritis  -Initially had Bright red blood per rectum and then black stools.  -Rectal exam done by the ED showed black stools.  -Hb dropped to 5.5  -3 units of PRBC  -GI was consulted, primary GI is Dr. Driver  -Clear liquid Diet  -Protonix Drip  -H and H q8 hours  -Keep 2 IV 18G  -Avoid NSAID's  -Tentative EGD by Dr. Driver on Monday if Hb stays stable.  -If Hb drops or patient becomes unstable GI will do the EGD on weekends.  -Had a colonoscopy years ago, normal according to the patient  -No nausea, vomiting or constipation    Shortness of Breath  -CXR negative for any cardiopulmonary disease  -Most likely due to acute drop in Hb to 5.5  -Was also having orthopnea since last 5 days.  -Will get ECHO  -H/O Aortic Stenosis  -BNP 1017 (Patient obese)    Gout  -Stable  -Takes colchicine and prednisone PRN    HTN	  -Metoprolol succinate 25mg daily    Diet - DASH  DVT - Sequential(GI bleed)  GI -PPI Drip  Activity - Increase as tolerated  FULL CODE	    Call the [facility] at [facility] in AM and complete the MEDREC 74 Y/O F w/ hx of gout, anemia, rheumatoid arthritis, PUD, HTN, Aortic stenosis, CAD p/w SOB and melena.    Melena /Upper GI bleed/h/o pud years ago  -Has a long history of Gastritis and abdominal pain.  -EGD last in July 2019 showed Gastritis  -Initially had Bright red blood per rectum and then black stools.  -Rectal exam done by the ED showed black stools.  -Hb dropped to 5.5  -3 units of PRBC  -GI was consulted, primary GI is Dr. Driver  -Clear liquid Diet  -Protonix Drip  -H and H q8 hours  -Keep 2 IV 18G  -Avoid NSAID's  -Tentative EGD by Dr. Driver on Monday if Hb stays stable.  -If Hb drops or patient becomes unstable GI will do the EGD on weekends.  -Had a colonoscopy years ago, normal according to the patient  -No nausea, vomiting or constipation    Shortness of Breath  -CXR negative for any cardiopulmonary disease  -Most likely due to acute drop in Hb to 5.5  -Was also having orthopnea since last 5 days.  -Will get ECHO  -H/O Aortic Stenosis  -BNP 1017 (Patient obese)    EVAN  -No previous baseline, creatinine 2.0  -Consider IV fluids after blood transfusion    Gout  -Stable  -Takes colchicine and prednisone PRN    HTN	  -Metoprolol succinate 25mg daily    Diet - DASH  DVT - Sequential(GI bleed)  GI -PPI Drip  Activity - Increase as tolerated  FULL CODE	    Call the [facility] at [facility] in AM and complete the MEDREC 74 Y/O F w/ hx of gout, anemia, rheumatoid arthritis, PUD, HTN, Aortic stenosis, CAD p/w SOB and melena.    Melena /Upper GI bleed/h/o pud years ago  -Has a long history of Gastritis and abdominal pain.  -EGD last in July 2019 showed Gastritis  -Initially had Bright red blood per rectum and then black stools.  -Rectal exam done by the ED showed black stools.  -Hb dropped to 5.5  -3 units of PRBC  -GI was consulted, primary GI is Dr. Driver  -Clear liquid Diet  -Protonix Drip  -H and H q8 hours  -Keep 2 IV 18G  -Avoid NSAID's  -Tentative EGD by Dr. Driver on Monday if Hb stays stable.  -If Hb drops or patient becomes unstable GI will do the EGD on weekends.  -Had a colonoscopy years ago, normal according to the patient  -No nausea, vomiting or constipation    Shortness of Breath  -CXR negative for any cardiopulmonary disease  -Most likely due to acute drop in Hb to 5.5  -Was also having orthopnea since last 5 days.  -Will get ECHO  -H/O Aortic Stenosis  -BNP 1017 (Patient obese)    CKD stage 3: Cr is stable.   -Consider IV fluids after blood transfusion    Gout  -Stable  -Takes colchicine and prednisone PRN    HTN	  -Metoprolol succinate 25mg daily    Diet - DASH  DVT - Sequential(GI bleed)  GI -PPI Drip  Activity - Increase as tolerated  FULL CODE	    Call the [facility] at [facility] in AM and complete the MEDREC

## 2019-10-18 NOTE — CONSULT NOTE ADULT - ASSESSMENT
A 73 y old female patient with pmhx of Gout was taking colchicine and prednisone (the last 7 days), hx of gastritis as per pt had an EGD by Dr Robles in July this year now patient complaining of black stools since 1 week.    # Acute blood loss anemia/ Melena resolving   VS stable, no signs of active GI bleed   Follow Hg q8h  2 IV 18 G   Protonix drip   Clear liquid diet   Avoid NSAIDS  Transfuse 3 U PRBC   EGD on Monday by Dr Robles or earlier if patient actively bleeds

## 2019-10-19 LAB
ALBUMIN SERPL ELPH-MCNC: 4.1 G/DL — SIGNIFICANT CHANGE UP (ref 3.5–5.2)
ALP SERPL-CCNC: 92 U/L — SIGNIFICANT CHANGE UP (ref 30–115)
ALT FLD-CCNC: 6 U/L — SIGNIFICANT CHANGE UP (ref 0–41)
ANION GAP SERPL CALC-SCNC: 15 MMOL/L — HIGH (ref 7–14)
AST SERPL-CCNC: 16 U/L — SIGNIFICANT CHANGE UP (ref 0–41)
BILIRUB SERPL-MCNC: 1.6 MG/DL — HIGH (ref 0.2–1.2)
BUN SERPL-MCNC: 26 MG/DL — HIGH (ref 10–20)
CALCIUM SERPL-MCNC: 9 MG/DL — SIGNIFICANT CHANGE UP (ref 8.5–10.1)
CHLORIDE SERPL-SCNC: 109 MMOL/L — SIGNIFICANT CHANGE UP (ref 98–110)
CO2 SERPL-SCNC: 17 MMOL/L — SIGNIFICANT CHANGE UP (ref 17–32)
CREAT SERPL-MCNC: 2.1 MG/DL — HIGH (ref 0.7–1.5)
GLUCOSE SERPL-MCNC: 81 MG/DL — SIGNIFICANT CHANGE UP (ref 70–99)
HCT VFR BLD CALC: 30.9 % — LOW (ref 37–47)
HGB BLD-MCNC: 9.5 G/DL — LOW (ref 12–16)
MCHC RBC-ENTMCNC: 26 PG — LOW (ref 27–31)
MCHC RBC-ENTMCNC: 30.7 G/DL — LOW (ref 32–37)
MCV RBC AUTO: 84.4 FL — SIGNIFICANT CHANGE UP (ref 81–99)
NRBC # BLD: 0 /100 WBCS — SIGNIFICANT CHANGE UP (ref 0–0)
PLATELET # BLD AUTO: 145 K/UL — SIGNIFICANT CHANGE UP (ref 130–400)
POTASSIUM SERPL-MCNC: 4.7 MMOL/L — SIGNIFICANT CHANGE UP (ref 3.5–5)
POTASSIUM SERPL-SCNC: 4.7 MMOL/L — SIGNIFICANT CHANGE UP (ref 3.5–5)
PROT SERPL-MCNC: 7.2 G/DL — SIGNIFICANT CHANGE UP (ref 6–8)
RBC # BLD: 3.66 M/UL — LOW (ref 4.2–5.4)
RBC # FLD: 15.4 % — HIGH (ref 11.5–14.5)
SODIUM SERPL-SCNC: 141 MMOL/L — SIGNIFICANT CHANGE UP (ref 135–146)
WBC # BLD: 5.14 K/UL — SIGNIFICANT CHANGE UP (ref 4.8–10.8)
WBC # FLD AUTO: 5.14 K/UL — SIGNIFICANT CHANGE UP (ref 4.8–10.8)

## 2019-10-19 PROCEDURE — 99223 1ST HOSP IP/OBS HIGH 75: CPT | Mod: AI

## 2019-10-19 PROCEDURE — 99233 SBSQ HOSP IP/OBS HIGH 50: CPT

## 2019-10-19 PROCEDURE — 71045 X-RAY EXAM CHEST 1 VIEW: CPT | Mod: 26

## 2019-10-19 RX ORDER — DIPHENHYDRAMINE HCL 50 MG
25 CAPSULE ORAL ONCE
Refills: 0 | Status: COMPLETED | OUTPATIENT
Start: 2019-10-19 | End: 2019-10-19

## 2019-10-19 RX ADMIN — PANTOPRAZOLE SODIUM 10 MG/HR: 20 TABLET, DELAYED RELEASE ORAL at 06:00

## 2019-10-19 RX ADMIN — Medication 25 MILLIGRAM(S): at 12:45

## 2019-10-19 RX ADMIN — Medication 25 MILLIGRAM(S): at 06:28

## 2019-10-19 RX ADMIN — PANTOPRAZOLE SODIUM 10 MG/HR: 20 TABLET, DELAYED RELEASE ORAL at 16:12

## 2019-10-19 NOTE — PROGRESS NOTE ADULT - ASSESSMENT
A 73 y old female patient with pmhx of Gout was taking colchicine and prednisone (the last 7 days), hx of gastritis as per pt had an EGD by Dr Robles in July this year now patient complaining of black stools since 1 week.    # Acute blood loss anemia/ Melena resolved   VS stable, no signs of active GI bleed   SP 3 U PRBC and Hg pending   Follow Hg q8h  2 IV 18 G   Protonix 40 IV BID   Clear liquid diet   Avoid NSAIDS  EGD on Monday by Dr Robles or earlier if patient actively bleeds A 73 y old female patient with pmhx of Gout was taking colchicine and prednisone (the last 7 days), hx of gastritis as per pt had an EGD by Dr Robles in July this year now patient complaining of black stools since 1 week.    # Acute blood loss anemia/ Melena resolved   VS stable, no signs of active GI bleed   SP 3 U PRBC and Hg pending   Follow Hg q8h  2 IV 18 G   Protonix 40 IV BID   Clear liquid diet   Avoid NSAIDS  NPO after midnight on sunday   EGD on Monday by Dr Robles or earlier if patient actively bleeds

## 2019-10-20 LAB
HCT VFR BLD CALC: 28.3 % — LOW (ref 37–47)
HGB BLD-MCNC: 8.5 G/DL — LOW (ref 12–16)
MCHC RBC-ENTMCNC: 25.2 PG — LOW (ref 27–31)
MCHC RBC-ENTMCNC: 30 G/DL — LOW (ref 32–37)
MCV RBC AUTO: 84 FL — SIGNIFICANT CHANGE UP (ref 81–99)
NRBC # BLD: 0 /100 WBCS — SIGNIFICANT CHANGE UP (ref 0–0)
PLATELET # BLD AUTO: 121 K/UL — LOW (ref 130–400)
RBC # BLD: 3.37 M/UL — LOW (ref 4.2–5.4)
RBC # FLD: 15.4 % — HIGH (ref 11.5–14.5)
WBC # BLD: 5.65 K/UL — SIGNIFICANT CHANGE UP (ref 4.8–10.8)
WBC # FLD AUTO: 5.65 K/UL — SIGNIFICANT CHANGE UP (ref 4.8–10.8)

## 2019-10-20 PROCEDURE — 93306 TTE W/DOPPLER COMPLETE: CPT | Mod: 26

## 2019-10-20 PROCEDURE — 99233 SBSQ HOSP IP/OBS HIGH 50: CPT

## 2019-10-20 RX ORDER — PANTOPRAZOLE SODIUM 20 MG/1
40 TABLET, DELAYED RELEASE ORAL EVERY 12 HOURS
Refills: 0 | Status: DISCONTINUED | OUTPATIENT
Start: 2019-10-20 | End: 2019-10-22

## 2019-10-20 RX ORDER — FUROSEMIDE 40 MG
20 TABLET ORAL DAILY
Refills: 0 | Status: DISCONTINUED | OUTPATIENT
Start: 2019-10-20 | End: 2019-10-22

## 2019-10-20 RX ADMIN — PANTOPRAZOLE SODIUM 10 MG/HR: 20 TABLET, DELAYED RELEASE ORAL at 05:14

## 2019-10-20 RX ADMIN — Medication 25 MILLIGRAM(S): at 05:14

## 2019-10-20 RX ADMIN — PANTOPRAZOLE SODIUM 40 MILLIGRAM(S): 20 TABLET, DELAYED RELEASE ORAL at 22:06

## 2019-10-20 NOTE — PROGRESS NOTE ADULT - ASSESSMENT
A 73 y old female patient with pmhx of Gout was taking colchicine and prednisone (the last 7 days), hx of gastritis as per pt had an EGD by Dr Robles in July this year now patient complaining of black stools since 1 week    Acute Anemia: R/o UGIB vs LGIB  H/o PUD and Gastritis   -EGD last in July 2019 showed Gastritis, Had a colonoscopy years ago, normal according to the patient  -Initially had Bright red blood per rectum and then black stools.  -Rectal exam done by the ED showed black stools.  -Hb dropped to 5.5, S/P 3 units of PRBC with appropriate response   -Tentative EGD by Dr. Driver on Monday if Hb stays stable.  -CBC Q8, Transfuse if less than 8, c/w IV Protonix 40 BID  --Hb STABLE AT 9, F/UP 4PM AND 11:30 CBC    Dyspnea:  likely secondary to Symptomatic Anemia: improved   -H/O Aortic Stenosis, S/P 3U RBC with appropriate response   -CXR negative for any cardiopulmonary disease, -BNP 1017 (Patient obese)  - F/up ECHO      CKD stage 3: Stable     H/O Gout: Stable  -c/w colchicine and prednisone PRN    H/O Essential HTN: well controlled   -Metoprolol succinate 25mg daily    DVT - Sequential(GI bleed)  GI -Protonix Push BID  FULL CODE	  Dispo: from home  -Pending Outcome of EGD

## 2019-10-20 NOTE — PROVIDER CONTACT NOTE (OTHER) - BACKGROUND
Patient on continuous protonix IV drip. Patient to go for Endoscopy tomorrow. Multiple attempts to place IV were made by 2 RNs. Last IV was placed by ultrasound.

## 2019-10-20 NOTE — PROGRESS NOTE ADULT - ASSESSMENT
A 74 yo female with PMH of HTN, CAD, rheumatoid arthritis, PUD, Aortic stenosis came to ED c/o SOB and dark stool, symptoms started 5 days ago with bright red stool then she noticed black stool and started with SOB and orthopnea, she denies chest pain, cough, fever or chills, she has no abdominal pain, no diarrhea.  In the ED, vital signs were stable, Labs showed Hb 5.4, CXR was clear, she received 3 RBCs and admitted to the hospital. Patient was started on clear liquid diet and Protonix drip      A/P:   Acute blood loss anemia, likely from GI bleeding  Hb 5.5, now 9.5  baseline 11  s/p 3 RBCs transfusion, hemodynamically stable, monitor H/H daily.     Gastrointestinal bleeding: likely upper.   Hx of PUD, chronic use of steroid for RA, likely another peptic ulcer disease  GI on board, plan for endoscopy on Monday or earlier if patient unstable.   Continue Pantoprazole infusion.    SOB: likely from acute anemia  Hx of AS, Pro-BNP mildly elevated  Order echo. CXR repeated after blood transfusion, showed vascular congestion.   May add Lasix 20mg daily.   Caution with IV fluid and blood transfusion.     HTN:   Continue Metoprolol    CKD stage 3:   Cr 2.0 around baseline 1.8 .     #Progress Note Handoff:  Pending (specify): EGD, echo, monitor H/H.   Family discussion:  Disposition: Home.

## 2019-10-21 ENCOUNTER — TRANSCRIPTION ENCOUNTER (OUTPATIENT)
Age: 74
End: 2019-10-21

## 2019-10-21 LAB
ANION GAP SERPL CALC-SCNC: 12 MMOL/L — SIGNIFICANT CHANGE UP (ref 7–14)
ANION GAP SERPL CALC-SCNC: 16 MMOL/L — HIGH (ref 7–14)
BASOPHILS # BLD AUTO: 0.01 K/UL — SIGNIFICANT CHANGE UP (ref 0–0.2)
BASOPHILS NFR BLD AUTO: 0.2 % — SIGNIFICANT CHANGE UP (ref 0–1)
BLD GP AB SCN SERPL QL: SIGNIFICANT CHANGE UP
BUN SERPL-MCNC: 23 MG/DL — HIGH (ref 10–20)
BUN SERPL-MCNC: 24 MG/DL — HIGH (ref 10–20)
CALCIUM SERPL-MCNC: 8.5 MG/DL — SIGNIFICANT CHANGE UP (ref 8.5–10.1)
CALCIUM SERPL-MCNC: 8.7 MG/DL — SIGNIFICANT CHANGE UP (ref 8.5–10.1)
CHLORIDE SERPL-SCNC: 105 MMOL/L — SIGNIFICANT CHANGE UP (ref 98–110)
CHLORIDE SERPL-SCNC: 106 MMOL/L — SIGNIFICANT CHANGE UP (ref 98–110)
CO2 SERPL-SCNC: 16 MMOL/L — LOW (ref 17–32)
CO2 SERPL-SCNC: 19 MMOL/L — SIGNIFICANT CHANGE UP (ref 17–32)
CREAT SERPL-MCNC: 2 MG/DL — HIGH (ref 0.7–1.5)
CREAT SERPL-MCNC: 2.1 MG/DL — HIGH (ref 0.7–1.5)
EOSINOPHIL # BLD AUTO: 0.25 K/UL — SIGNIFICANT CHANGE UP (ref 0–0.7)
EOSINOPHIL NFR BLD AUTO: 4.1 % — SIGNIFICANT CHANGE UP (ref 0–8)
GLUCOSE SERPL-MCNC: 77 MG/DL — SIGNIFICANT CHANGE UP (ref 70–99)
GLUCOSE SERPL-MCNC: 85 MG/DL — SIGNIFICANT CHANGE UP (ref 70–99)
HCT VFR BLD CALC: 28.5 % — LOW (ref 37–47)
HCT VFR BLD CALC: 28.5 % — LOW (ref 37–47)
HGB BLD-MCNC: 8.6 G/DL — LOW (ref 12–16)
HGB BLD-MCNC: 8.8 G/DL — LOW (ref 12–16)
IMM GRANULOCYTES NFR BLD AUTO: 0.3 % — SIGNIFICANT CHANGE UP (ref 0.1–0.3)
INR BLD: 1.07 RATIO — SIGNIFICANT CHANGE UP (ref 0.65–1.3)
LYMPHOCYTES # BLD AUTO: 0.96 K/UL — LOW (ref 1.2–3.4)
LYMPHOCYTES # BLD AUTO: 15.8 % — LOW (ref 20.5–51.1)
MCHC RBC-ENTMCNC: 25.2 PG — LOW (ref 27–31)
MCHC RBC-ENTMCNC: 25.9 PG — LOW (ref 27–31)
MCHC RBC-ENTMCNC: 30.2 G/DL — LOW (ref 32–37)
MCHC RBC-ENTMCNC: 30.9 G/DL — LOW (ref 32–37)
MCV RBC AUTO: 83.6 FL — SIGNIFICANT CHANGE UP (ref 81–99)
MCV RBC AUTO: 83.8 FL — SIGNIFICANT CHANGE UP (ref 81–99)
MONOCYTES # BLD AUTO: 0.65 K/UL — HIGH (ref 0.1–0.6)
MONOCYTES NFR BLD AUTO: 10.7 % — HIGH (ref 1.7–9.3)
NEUTROPHILS # BLD AUTO: 4.19 K/UL — SIGNIFICANT CHANGE UP (ref 1.4–6.5)
NEUTROPHILS NFR BLD AUTO: 68.9 % — SIGNIFICANT CHANGE UP (ref 42.2–75.2)
NRBC # BLD: 0 /100 WBCS — SIGNIFICANT CHANGE UP (ref 0–0)
NRBC # BLD: 0 /100 WBCS — SIGNIFICANT CHANGE UP (ref 0–0)
PLATELET # BLD AUTO: 130 K/UL — SIGNIFICANT CHANGE UP (ref 130–400)
PLATELET # BLD AUTO: 143 K/UL — SIGNIFICANT CHANGE UP (ref 130–400)
POTASSIUM SERPL-MCNC: 4.8 MMOL/L — SIGNIFICANT CHANGE UP (ref 3.5–5)
POTASSIUM SERPL-MCNC: 5 MMOL/L — SIGNIFICANT CHANGE UP (ref 3.5–5)
POTASSIUM SERPL-SCNC: 4.8 MMOL/L — SIGNIFICANT CHANGE UP (ref 3.5–5)
POTASSIUM SERPL-SCNC: 5 MMOL/L — SIGNIFICANT CHANGE UP (ref 3.5–5)
PROTHROM AB SERPL-ACNC: 12.3 SEC — SIGNIFICANT CHANGE UP (ref 9.95–12.87)
RBC # BLD: 3.4 M/UL — LOW (ref 4.2–5.4)
RBC # BLD: 3.41 M/UL — LOW (ref 4.2–5.4)
RBC # FLD: 15.5 % — HIGH (ref 11.5–14.5)
RBC # FLD: 15.6 % — HIGH (ref 11.5–14.5)
SODIUM SERPL-SCNC: 136 MMOL/L — SIGNIFICANT CHANGE UP (ref 135–146)
SODIUM SERPL-SCNC: 138 MMOL/L — SIGNIFICANT CHANGE UP (ref 135–146)
WBC # BLD: 6.08 K/UL — SIGNIFICANT CHANGE UP (ref 4.8–10.8)
WBC # BLD: 6.18 K/UL — SIGNIFICANT CHANGE UP (ref 4.8–10.8)
WBC # FLD AUTO: 6.08 K/UL — SIGNIFICANT CHANGE UP (ref 4.8–10.8)
WBC # FLD AUTO: 6.18 K/UL — SIGNIFICANT CHANGE UP (ref 4.8–10.8)

## 2019-10-21 PROCEDURE — 99233 SBSQ HOSP IP/OBS HIGH 50: CPT

## 2019-10-21 PROCEDURE — 93010 ELECTROCARDIOGRAM REPORT: CPT

## 2019-10-21 RX ORDER — ACETAMINOPHEN 500 MG
650 TABLET ORAL EVERY 6 HOURS
Refills: 0 | Status: DISCONTINUED | OUTPATIENT
Start: 2019-10-21 | End: 2019-10-22

## 2019-10-21 RX ORDER — CHLORHEXIDINE GLUCONATE 213 G/1000ML
1 SOLUTION TOPICAL DAILY
Refills: 0 | Status: DISCONTINUED | OUTPATIENT
Start: 2019-10-21 | End: 2019-10-22

## 2019-10-21 RX ORDER — SOD SULF/SODIUM/NAHCO3/KCL/PEG
4000 SOLUTION, RECONSTITUTED, ORAL ORAL ONCE
Refills: 0 | Status: COMPLETED | OUTPATIENT
Start: 2019-10-21 | End: 2019-10-21

## 2019-10-21 RX ORDER — ONDANSETRON 8 MG/1
4 TABLET, FILM COATED ORAL ONCE
Refills: 0 | Status: COMPLETED | OUTPATIENT
Start: 2019-10-21 | End: 2019-10-21

## 2019-10-21 RX ADMIN — Medication 4000 MILLILITER(S): at 15:21

## 2019-10-21 RX ADMIN — Medication 20 MILLIGRAM(S): at 05:35

## 2019-10-21 RX ADMIN — Medication 650 MILLIGRAM(S): at 09:16

## 2019-10-21 RX ADMIN — Medication 650 MILLIGRAM(S): at 08:46

## 2019-10-21 RX ADMIN — PANTOPRAZOLE SODIUM 40 MILLIGRAM(S): 20 TABLET, DELAYED RELEASE ORAL at 05:34

## 2019-10-21 RX ADMIN — PANTOPRAZOLE SODIUM 40 MILLIGRAM(S): 20 TABLET, DELAYED RELEASE ORAL at 17:29

## 2019-10-21 RX ADMIN — Medication 20 MILLIGRAM(S): at 21:46

## 2019-10-21 RX ADMIN — ONDANSETRON 4 MILLIGRAM(S): 8 TABLET, FILM COATED ORAL at 16:26

## 2019-10-21 NOTE — PROGRESS NOTE ADULT - ASSESSMENT
A 72 yo female with PMH of HTN, CAD, rheumatoid arthritis, PUD, Aortic stenosis came to ED c/o SOB and dark stool, symptoms started 5 days ago with bright red stool then she noticed black stool and started with SOB and orthopnea, she denies chest pain, cough, fever or chills, she has no abdominal pain, no diarrhea.  In the ED, vital signs were stable, Labs showed Hb 5.4, CXR was clear, she received 3 RBCs and admitted to the hospital. Patient was started on clear liquid diet and Protonix drip.      A/P:   ## Acute blood loss anemia, likely from GI bleeding  Hb 5.5, s/p 3x PRBC Tx >>> 8.8 today.   baseline 11   hemodynamically stable, monitor H/H daily.   Awaiting EGD today.   Hx of PUD, chronic use of steroid for RA, likely another peptic ulcer disease  Continue Pantoprazole IV BID.     SOB: likely from acute symptomatic anemia  Hx of AS, Pro-BNP mildly elevated  Order echo. CXR repeated after blood transfusion, showed vascular congestion.   May add Lasix 20mg daily.   Caution with IV fluid and blood transfusion.     HTN:   Continue Metoprolol    CKD stage 3:   Cr 2.0 around baseline 1.8 .     #Progress Note Handoff:  Pending (specify): EGD, Stable H/H.   Family discussion: JHONNY, d/w the patient.  Disposition: Home.

## 2019-10-21 NOTE — PROGRESS NOTE ADULT - ASSESSMENT
A 73 y old female patient with pmhx of Gout was taking colchicine and prednisone (the last 7 days), hx of gastritis as per pt had an EGD by Dr Robles in July this year now patient complaining of black stools since 1 week    Acute Anemia: R/o UGIB vs LGIB,  Hbg 5.5 on presentation s/p 3 U PRBC, currently stable 8.8  -EGD today by Dr. Driver   -monitor CBC, Transfuse if Hbg < 7.5, c/w IV Protonix 40 BID      Dyspnea: symptomatic anemia >> resolved   - Aortic Stenosis,  -CXR negative for any cardiopulmonary disease, -BNP 1017 (Patient obese)  - ECHO: type 1 diastolic DYSFUNCTION,   severe AS,   -no signs of fluid overload,    CKD stage 3: Stable     H/O Gout: Stable  -c/w colchicine and prednisone PRN    H/O Essential HTN: well controlled   -Metoprolol succinate 25mg daily    DVT - Sequential(GI bleed)  GI -Protonix Push BID  FULL CODE	  Dispo: from home  -Pending Outcome of EGD

## 2019-10-22 ENCOUNTER — TRANSCRIPTION ENCOUNTER (OUTPATIENT)
Age: 74
End: 2019-10-22

## 2019-10-22 VITALS
SYSTOLIC BLOOD PRESSURE: 125 MMHG | RESPIRATION RATE: 18 BRPM | HEART RATE: 77 BPM | DIASTOLIC BLOOD PRESSURE: 62 MMHG | TEMPERATURE: 98 F

## 2019-10-22 LAB
BASOPHILS # BLD AUTO: 0.02 K/UL — SIGNIFICANT CHANGE UP (ref 0–0.2)
BASOPHILS NFR BLD AUTO: 0.3 % — SIGNIFICANT CHANGE UP (ref 0–1)
EOSINOPHIL # BLD AUTO: 0.3 K/UL — SIGNIFICANT CHANGE UP (ref 0–0.7)
EOSINOPHIL NFR BLD AUTO: 4.7 % — SIGNIFICANT CHANGE UP (ref 0–8)
HCT VFR BLD CALC: 29.5 % — LOW (ref 37–47)
HGB BLD-MCNC: 9 G/DL — LOW (ref 12–16)
IMM GRANULOCYTES NFR BLD AUTO: 0.3 % — SIGNIFICANT CHANGE UP (ref 0.1–0.3)
LYMPHOCYTES # BLD AUTO: 0.97 K/UL — LOW (ref 1.2–3.4)
LYMPHOCYTES # BLD AUTO: 15.3 % — LOW (ref 20.5–51.1)
MCHC RBC-ENTMCNC: 25.6 PG — LOW (ref 27–31)
MCHC RBC-ENTMCNC: 30.5 G/DL — LOW (ref 32–37)
MCV RBC AUTO: 83.8 FL — SIGNIFICANT CHANGE UP (ref 81–99)
MONOCYTES # BLD AUTO: 1.07 K/UL — HIGH (ref 0.1–0.6)
MONOCYTES NFR BLD AUTO: 16.9 % — HIGH (ref 1.7–9.3)
NEUTROPHILS # BLD AUTO: 3.97 K/UL — SIGNIFICANT CHANGE UP (ref 1.4–6.5)
NEUTROPHILS NFR BLD AUTO: 62.5 % — SIGNIFICANT CHANGE UP (ref 42.2–75.2)
NRBC # BLD: 0 /100 WBCS — SIGNIFICANT CHANGE UP (ref 0–0)
PLATELET # BLD AUTO: 117 K/UL — LOW (ref 130–400)
RBC # BLD: 3.52 M/UL — LOW (ref 4.2–5.4)
RBC # FLD: 15.6 % — HIGH (ref 11.5–14.5)
WBC # BLD: 6.35 K/UL — SIGNIFICANT CHANGE UP (ref 4.8–10.8)
WBC # FLD AUTO: 6.35 K/UL — SIGNIFICANT CHANGE UP (ref 4.8–10.8)

## 2019-10-22 PROCEDURE — 99285 EMERGENCY DEPT VISIT HI MDM: CPT

## 2019-10-22 RX ORDER — PANTOPRAZOLE SODIUM 20 MG/1
1 TABLET, DELAYED RELEASE ORAL
Qty: 60 | Refills: 0
Start: 2019-10-22 | End: 2019-11-20

## 2019-10-22 RX ORDER — METOPROLOL TARTRATE 50 MG
1 TABLET ORAL
Qty: 0 | Refills: 0 | DISCHARGE
Start: 2019-10-22

## 2019-10-22 RX ORDER — FUROSEMIDE 40 MG
1 TABLET ORAL
Qty: 0 | Refills: 0 | DISCHARGE
Start: 2019-10-22

## 2019-10-22 RX ADMIN — Medication 20 MILLIGRAM(S): at 05:10

## 2019-10-22 RX ADMIN — Medication 25 MILLIGRAM(S): at 05:10

## 2019-10-22 RX ADMIN — PANTOPRAZOLE SODIUM 40 MILLIGRAM(S): 20 TABLET, DELAYED RELEASE ORAL at 17:05

## 2019-10-22 RX ADMIN — PANTOPRAZOLE SODIUM 40 MILLIGRAM(S): 20 TABLET, DELAYED RELEASE ORAL at 05:10

## 2019-10-22 NOTE — PROGRESS NOTE ADULT - PROVIDER SPECIALTY LIST ADULT
Gastroenterology
Hospitalist
Internal Medicine
Normal for race

## 2019-10-22 NOTE — DISCHARGE NOTE PROVIDER - PROVIDER TOKENS
PROVIDER:[TOKEN:[[id number]],FOLLOWUP:[2 weeks]] PROVIDER:[TOKEN:[[id number]],FOLLOWUP:[2 weeks]],PROVIDER:[TOKEN:[[id number]],FOLLOWUP:[1 week]]

## 2019-10-22 NOTE — PROGRESS NOTE ADULT - SUBJECTIVE AND OBJECTIVE BOX
Patient is a 73y old  Female who presents with a chief complaint of Bleeding per rectum and shortness of breath (21 Oct 2019 12:33)      OVERNIGHT EVENTS: remained stable, had no bloody BM or melena     SUBJECTIVE / INTERVAL HPI: Patient seen and examined at bedside.     VITAL SIGNS:  Vital Signs Last 24 Hrs  T(C): 35.4 (22 Oct 2019 04:42), Max: 36.6 (21 Oct 2019 11:35)  T(F): 95.8 (22 Oct 2019 04:42), Max: 97.8 (21 Oct 2019 11:35)  HR: 81 (22 Oct 2019 04:42) (66 - 81)  BP: 135/63 (22 Oct 2019 04:42) (90/47 - 135/63)  BP(mean): --  RR: 18 (22 Oct 2019 04:42) (12 - 18)  SpO2: 98% (21 Oct 2019 13:01) (98% - 99%)    PHYSICAL EXAM:    General: WDWN  HEENT: NC/AT; PERRL, clear conjunctiva  Neck: supple  Cardiovascular: +S1/S2; RRR  Respiratory: CTA b/l; no W/R/R  Gastrointestinal: soft, NT/ND; +BSx4  Extremities: WWP; 2+ peripheral pulses; no edema   Neurological: AAOx3; no focal deficits    MEDICATIONS:  MEDICATIONS  (STANDING):  bisacodyl 20 milliGRAM(s) Oral at bedtime  chlorhexidine 4% Liquid 1 Application(s) Topical daily  furosemide    Tablet 20 milliGRAM(s) Oral daily  metoprolol succinate ER 25 milliGRAM(s) Oral daily  pantoprazole  Injectable 40 milliGRAM(s) IV Push every 12 hours    MEDICATIONS  (PRN):  acetaminophen   Tablet .. 650 milliGRAM(s) Oral every 6 hours PRN Mild Pain (1 - 3)      ALLERGIES:  Allergies    latex (Unknown)  penicillins (Unknown)    Intolerances        LABS:                        8.8    6.08  )-----------( 143      ( 21 Oct 2019 05:27 )             28.5     10-21    138  |  106  |  24<H>  ----------------------------<  85  5.0   |  16<L>  |  2.0<H>    Ca    8.5      21 Oct 2019 20:45      PT/INR - ( 21 Oct 2019 05:27 )   PT: 12.30 sec;   INR: 1.07 ratio       < from: EGD (10.21.19 @ 09:15) >  Impressions:    Esophagitis compatible with non-erosive esophagitis.    Erythema and congestion in the stomach.    Erythema and congestion in the duodenum compatible with duodenitis.     < end of copied text >
ARREAGA LATRICIA  73y  Female      Patient is a 73y old  Female who presents with a chief complaint of Bleeding per rectum and shortness of breath (21 Oct 2019 07:40)      INTERVAL HPI/OVERNIGHT EVENTS:      ******************************* REVIEW OF SYSTEMS:**********************************************      resting in bed.   All other review of systems negative    *********************** VITALS ******************************************    T(F): 97.8 (10-21-19 @ 11:35)  HR: 68 (10-21-19 @ 11:43) (68 - 80)  BP: 119/57 (10-21-19 @ 11:43) (102/53 - 146/64)  RR: 16 (10-21-19 @ 11:35) (16 - 20)  SpO2: 98% (10-21-19 @ 07:04) (98% - 100%)            ******************************** PHYSICAL EXAM:**************************************************  GENERAL: NAD    PSYCH: no agitation, baseline mentation  HEENT:     NERVOUS SYSTEM:  Alert & Oriented X3, MS  5/5 B/L  UE and LE ; Sensory intact    PULMONARY: GRAHAM, CTA    CARDIOVASCULAR: S1S2 RRR    GI: Soft, NT, ND; BS present.    EXTREMITIES:  2+ Peripheral Pulses, No clubbing, cyanosis, or edema    LYMPH: No lymphadenopathy noted    SKIN: No rashes or lesions    ******************************************************************************************        **************************** LABS *******************************************************                          8.8    6.08  )-----------( 143      ( 21 Oct 2019 05:27 )             28.5     10-21    136  |  105  |  23<H>  ----------------------------<  77  4.8   |  19  |  2.1<H>    Ca    8.7      21 Oct 2019 05:27          PT/INR - ( 21 Oct 2019 05:27 )   PT: 12.30 sec;   INR: 1.07 ratio           Lactate Trend  10-18 @ 15:00 Lactate:1.6         CAPILLARY BLOOD GLUCOSE              **************************Active Medications *******************************************  latex (Unknown)  penicillins (Unknown)      acetaminophen   Tablet .. 650 milliGRAM(s) Oral every 6 hours PRN  chlorhexidine 4% Liquid 1 Application(s) Topical daily  furosemide    Tablet 20 milliGRAM(s) Oral daily  metoprolol succinate ER 25 milliGRAM(s) Oral daily  pantoprazole  Injectable 40 milliGRAM(s) IV Push every 12 hours      ***************************************************  RADIOLOGY & ADDITIONAL TESTS:    Imaging Personally Reviewed:  [ ] YES  [ ] NO    HEALTH ISSUES - PROBLEM Dx:
GIBSON LATRICIA  73y  Female      Patient is a 73y old  Female who presents with a chief complaint of Bleeding per rectum and shortness of breath (22 Oct 2019 09:33)      INTERVAL HPI/OVERNIGHT EVENTS:      ******************************* REVIEW OF SYSTEMS:**********************************************      All other review of systems negative    *********************** VITALS ******************************************    T(F): 97.9 (10-22-19 @ 12:24)  HR: 69 (10-22-19 @ 12:24) (66 - 81)  BP: 131/64 (10-22-19 @ 12:24) (110/54 - 135/63)  RR: 18 (10-22-19 @ 12:24) (15 - 18)  SpO2: 98% (10-21-19 @ 13:01) (98% - 98%)            ******************************** PHYSICAL EXAM:**************************************************  GENERAL: NAD    PSYCH: no agitation, baseline mentation  HEENT:     NERVOUS SYSTEM:  Alert & Oriented X3, MS  5/5 B/L  UE and LE ; Sensory intact    PULMONARY: GRAHAM, CTA    CARDIOVASCULAR: S1S2 RRR    GI: Soft, NT, ND; BS present.    EXTREMITIES:  2+ Peripheral Pulses, No clubbing, cyanosis, or edema    LYMPH: No lymphadenopathy noted    SKIN: No rashes or lesions    ******************************************************************************************      **************************** LABS *******************************************************                          9.0    6.35  )-----------( 117      ( 22 Oct 2019 06:11 )             29.5     10-21    138  |  106  |  24<H>  ----------------------------<  85  5.0   |  16<L>  |  2.0<H>    Ca    8.5      21 Oct 2019 20:45          PT/INR - ( 21 Oct 2019 05:27 )   PT: 12.30 sec;   INR: 1.07 ratio           Lactate Trend  10-18 @ 15:00 Lactate:1.6         CAPILLARY BLOOD GLUCOSE              **************************Active Medications *******************************************  latex (Unknown)  penicillins (Unknown)      acetaminophen   Tablet .. 650 milliGRAM(s) Oral every 6 hours PRN  bisacodyl 20 milliGRAM(s) Oral at bedtime  chlorhexidine 4% Liquid 1 Application(s) Topical daily  furosemide    Tablet 20 milliGRAM(s) Oral daily  metoprolol succinate ER 25 milliGRAM(s) Oral daily  pantoprazole  Injectable 40 milliGRAM(s) IV Push every 12 hours      ***************************************************  RADIOLOGY & ADDITIONAL TESTS:    Imaging Personally Reviewed:  [ ] YES  [ ] NO    HEALTH ISSUES - PROBLEM Dx:
LATRICIA ARREAGA  73y  Female      Patient is a 73y old  Female who presents with a chief complaint of Bleeding per rectum and shortness of breath (20 Oct 2019 11:32)      INTERVAL HPI/OVERNIGHT EVENTS:  She still with mild SOB, she denies any further melena or hematochezia   Vital Signs Last 24 Hrs  T(C): 36.8 (20 Oct 2019 07:56), Max: 36.8 (19 Oct 2019 23:19)  T(F): 98.3 (20 Oct 2019 07:56), Max: 98.3 (19 Oct 2019 23:19)  HR: 76 (20 Oct 2019 07:56) (71 - 80)  BP: 106/49 (20 Oct 2019 07:56) (106/49 - 140/62)  BP(mean): --  RR: 18 (20 Oct 2019 07:56) (18 - 18)  SpO2: 97% (20 Oct 2019 07:56) (93% - 99%)            Consultant(s) Notes Reviewed:  [x ] YES  [ ] NO          MEDICATIONS  (STANDING):  furosemide    Tablet 20 milliGRAM(s) Oral daily  metoprolol succinate ER 25 milliGRAM(s) Oral daily  pantoprazole  Injectable 40 milliGRAM(s) IV Push every 12 hours    MEDICATIONS  (PRN):      LABS                          9.5    5.14  )-----------( 145      ( 19 Oct 2019 12:22 )             30.9     10-19    141  |  109  |  26<H>  ----------------------------<  81  4.7   |  17  |  2.1<H>    Ca    9.0      19 Oct 2019 12:22    TPro  7.2  /  Alb  4.1  /  TBili  1.6<H>  /  DBili  x   /  AST  16  /  ALT  6   /  AlkPhos  92  10-19        PT/INR - ( 18 Oct 2019 16:18 )   PT: 12.60 sec;   INR: 1.10 ratio         PTT - ( 18 Oct 2019 16:18 )  PTT:33.3 sec  Lactate Trend  10-18 @ 15:00 Lactate:1.6     CARDIAC MARKERS ( 18 Oct 2019 15:00 )  x     / <0.01 ng/mL / x     / x     / x          CAPILLARY BLOOD GLUCOSE      POCT Blood Glucose.: 78 mg/dL (18 Oct 2019 22:06)        RADIOLOGY & ADDITIONAL TESTS:    Imaging Personally Reviewed:  [ ] YES  [ ] NO    HEALTH ISSUES - PROBLEM Dx:        PHYSICAL EXAM:  GENERAL: NAD, well-developed  HEAD:  Atraumatic, Normocephalic  EYES: EOMI, PERRLA, conjunctiva and sclera clear  NECK: Supple, No JVD  CHEST/LUNG: Clear to auscultation bilaterally; No wheeze  HEART: Regular rate and rhythm; S1 S2  ABDOMEN: Soft, Nontender, Nondistended; Bowel sounds present  EXTREMITIES:  2+ Peripheral Pulses, No clubbing, cyanosis, or edema  PSYCH: AAOx3  NEUROLOGY: non-focal  SKIN: No rashes or lesions
Patient is a 73y old  Female who presents with a chief complaint of Bleeding per rectum and shortness of breath     Admitted to Medicine for Evaluation GI Bleed     Interval events: none    Today is hosp day 2  Patient is resting comfortably in bed   No complaints, no blood per rectum since yesterday   Hemoglobin stable   PLAn: EGD tomorrow       PAST MEDICAL & SURGICAL HISTORY:  Eczema  Heart murmur  HTN (hypertension)  H/O cholecystitis  H/O appendicitis      MEDICATIONS  (STANDING):  metoprolol succinate ER 25 milliGRAM(s) Oral daily  pantoprazole Infusion 8 mG/Hr (10 mL/Hr) IV Continuous <Continuous>    MEDICATIONS  (PRN):          Vital Signs Last 24 Hrs  T(C): 36.8 (20 Oct 2019 07:56), Max: 36.8 (19 Oct 2019 23:19)  T(F): 98.3 (20 Oct 2019 07:56), Max: 98.3 (19 Oct 2019 23:19)  HR: 76 (20 Oct 2019 07:56) (71 - 80)  BP: 106/49 (20 Oct 2019 07:56) (106/49 - 140/62)  BP(mean): --  RR: 18 (20 Oct 2019 07:56) (18 - 18)  SpO2: 97% (20 Oct 2019 07:56) (93% - 99%)  CAPILLARY BLOOD GLUCOSE        I&O's Summary      Physical Exam:    -     General : NAD, resting comfortably in bed     -      Cardiac: S1/S2 appreciated RRR, no murmurs     -      Pulm: S1/S2 appreciated no Adventitious sounds     -      GI: Soft, NT, ND     -      Musculoskeletal: Atraumatic, no LE edema, no skin color changes     -      Neuro: AO x 3, non-focal         Labs:                        9.5    5.14  )-----------( 145      ( 19 Oct 2019 12:22 )             30.9             10-19    141  |  109  |  26<H>  ----------------------------<  81  4.7   |  17  |  2.1<H>    Ca    9.0      19 Oct 2019 12:22    TPro  7.2  /  Alb  4.1  /  TBili  1.6<H>  /  DBili  x   /  AST  16  /  ALT  6   /  AlkPhos  92  10-19    LIVER FUNCTIONS - ( 19 Oct 2019 12:22 )  Alb: 4.1 g/dL / Pro: 7.2 g/dL / ALK PHOS: 92 U/L / ALT: 6 U/L / AST: 16 U/L / GGT: x                 PT/INR - ( 18 Oct 2019 16:18 )   PT: 12.60 sec;   INR: 1.10 ratio         PTT - ( 18 Oct 2019 16:18 )  PTT:33.3 sec  CARDIAC MARKERS ( 18 Oct 2019 15:00 )  x     / <0.01 ng/mL / x     / x     / x                  Imaging:    ECG:
Patient is a 73y old  Female who presents with a chief complaint of Bleeding per rectum and shortness of breath (20 Oct 2019 12:27)      OVERNIGHT EVENTS: remained stable     SUBJECTIVE / INTERVAL HPI: Patient seen and examined at bedside.     VITAL SIGNS:  Vital Signs Last 24 Hrs  T(C): 36.4 (21 Oct 2019 07:04), Max: 36.9 (20 Oct 2019 16:06)  T(F): 97.6 (21 Oct 2019 07:04), Max: 98.4 (20 Oct 2019 16:06)  HR: 74 (21 Oct 2019 07:04) (69 - 80)  BP: 102/53 (21 Oct 2019 07:04) (102/53 - 146/64)  BP(mean): --  RR: 20 (21 Oct 2019 07:04) (18 - 20)  SpO2: 98% (21 Oct 2019 07:04) (97% - 100%)    PHYSICAL EXAM:    General: WDWN  HEENT: NC/AT; PERRL, clear conjunctiva  Neck: supple  Cardiovascular: +S1/S2; RRR  Respiratory: CTA b/l; no W/R/R  Gastrointestinal: soft, NT/ND; +BSx4  Extremities: WWP; 2+ peripheral pulses; no edema   Neurological: AAOx3; no focal deficits    MEDICATIONS:  MEDICATIONS  (STANDING):  chlorhexidine 4% Liquid 1 Application(s) Topical daily  furosemide    Tablet 20 milliGRAM(s) Oral daily  metoprolol succinate ER 25 milliGRAM(s) Oral daily  pantoprazole  Injectable 40 milliGRAM(s) IV Push every 12 hours    MEDICATIONS  (PRN):      ALLERGIES:  Allergies    latex (Unknown)  penicillins (Unknown)    Intolerances        LABS:                        8.8    6.08  )-----------( 143      ( 21 Oct 2019 05:27 )             28.5     10-21    136  |  105  |  23<H>  ----------------------------<  77  4.8   |  19  |  2.1<H>    Ca    8.7      21 Oct 2019 05:27    TPro  7.2  /  Alb  4.1  /  TBili  1.6<H>  /  DBili  x   /  AST  16  /  ALT  6   /  AlkPhos  92  10-19    PT/INR - ( 21 Oct 2019 05:27 )   PT: 12.30 sec;   INR: 1.07 ratio      < from: Xray Chest 1 View- PORTABLE-Urgent (10.19.19 @ 10:01) >    Impression:      No radiographic evidence of acute cardiopulmonary disease.    < end of copied text >  < from: [facility] Duplex Lower Ext Vein Scan, Bilat (10.18.19 @ 16:46) >  Impression:    No evidence of deep venous thrombosis or superficial thrombophlebitis in   bilateral lowerextremities.    < end of copied text >  < from: Transthoracic Echocardiogram (10.20.19 @ 10:30) >  PHYSICIAN INTERPRETATION:  Left Ventricle: Normal left ventricular size and wall thicknesses, with   normal systolic and diastolic function. Spectral Doppler shows impaired   relaxation pattern of left ventricular myocardial filling (Grade I   diastolic dysfunction).  Right Ventricle: Normal right ventricular size and function.  Left Atrium: Normal left atrialsize.  Right Atrium: Normal right atrial size.  Pericardium: There is no evidence of pericardial effusion.  Mitral Valve: Structurally normal mitral valve, with normal leaflet   excursion. The mitral valve is normal in structure. Moderate mitral valve   regurgitation is seen.  Tricuspid Valve: Structurally normal tricuspid valve, with normal leaflet   excursion. The tricuspid valve is normal in structure. Mild tricuspid   regurgitation is visualized. Estimated pulmonary artery systolic pressure   is 38.9 mmHg assuming a right atrial pressure of 5 mmHg, which is   consistent with borderline pulmonary hypertension.  Aortic Valve: The aortic valve was not well visualized. The aortic valve   is trileaflet. Sclerotic aortic valve with decreased opening. Peak   transaortic gradient equals 60.9 mmHg, mean transaortic gradient equals   35.0 mmHg, the calculated aortic valve area equals 0.91 cm² by the   continuity equation consistent with severe aortic stenosis. Mild aortic   valve regurgitation is seen.  Pulmonic Valve: The pulmonic valve was not well visualized. The pulmonic   valve is normal.  Aorta: The aortic root and ascending aorta are structurally normal, with   no evidence of dilitation.  Pulmonary Artery: The main pulmonary artery is normal in size.    < end of copied text >
We are following the patient for Melena     GI HPI Today:  Patient feels good today   No bloody BM   No overnight events   SP 3 U PRBC   Hg results pending     PAST MEDICAL & SURGICAL HISTORY  Eczema  Heart murmur  HTN (hypertension)  H/O cholecystitis  H/O appendicitis      ALLERGIES:  latex (Unknown)  penicillins (Unknown)      MEDICATIONS:  MEDICATIONS  (STANDING):  metoprolol succinate ER 25 milliGRAM(s) Oral daily  pantoprazole Infusion 8 mG/Hr (10 mL/Hr) IV Continuous <Continuous>    MEDICATIONS  (PRN):      REVIEW OF SYSTEMS  General:  No fevers  Eyes:  No reported pain   ENT:  No sore throat   NECK: No stiffness   CV:  No chest pain   Resp:  No shortness of breath  GI:  See HPI  :  No dysuria  Muscle:  No weakness  Neuro:  No tingling  Endocrine:  No polyuria  Heme:  No ecchymosis        VITALS:   T(F): 97.9 (10-19 @ 08:08), Max: 98.4 (10-19 @ 06:20)  HR: 70 (10-19 @ 08:08) (70 - 94)  BP: 128/60 (10-19 @ 08:08) (124/59 - 144/59)  BP(mean): --  RR: 18 (10-19 @ 08:08) (18 - 19)  SpO2: 99% (10-19 @ 08:08) (99% - 100%)        PHYSICAL EXAM:  GENERAL:  Appears in no distress  HEENT:  Conjunctivae Anicteric   CHEST:  Full & symmetric excursion  HEART:  NS1, S2,   ABDOMEN:  Soft, non-tender, non-distended, normoactive bowel sounds,  no masses   EXTREMITIES  no edema  SKIN:  No rash  NEURO:  Alert         Blood Work :                        5.5    4.54  )-----------( 148      ( 18 Oct 2019 15:00 )             18.2     PT/INR - ( 18 Oct 2019 16:18 )  INR: 1.10          PTT - ( 18 Oct 2019 16:18 )  PTT:33.3   10-18    140  |  109  |  30<H>  ----------------------------<  101<H>  5.4<H>   |  20  |  2.0<H>    Ca    8.5      18 Oct 2019 15:00      CBC -  ( 18 Oct 2019 15:00 )  Hemoglobin : 5.5      LIVER FUNCTIONS - ( 18 Oct 2019 15:00 )  Alb: 3.8 [3.5 - 5.2] / Pro: 6.9 [6.0 - 8.0] / ALK PHOS: 90 [30 - 115] / ALT: 6 [0 - 41] / AST: 17 [0 - 41] / GGT: x

## 2019-10-22 NOTE — DISCHARGE NOTE PROVIDER - NSDCCPCAREPLAN_GEN_ALL_CORE_FT
PRINCIPAL DISCHARGE DIAGNOSIS  Diagnosis: GI bleeding  Assessment and Plan of Treatment: you had bloody bowel movements, you had anemia nd was transfused blood, you hemoglobin has been stable and your symptoms resolved, you had upper and lower enscope and showed inflammation on the esophagus and duedenum with colon--------, follow up as outpatient with the GI clinic and take yoyr medication as nstructed, if you develop shortness of breath, bloody bowel movement, bloody vomiting or black stool get evaluation from the emergency department. PRINCIPAL DISCHARGE DIAGNOSIS  Diagnosis: GI bleeding  Assessment and Plan of Treatment: you had bloody bowel movements, you had anemia nd was transfused blood, you hemoglobin has been stable and your symptoms resolved, you had upper and lower endoscopy and showed inflammation on the esophagus and duedenum with colon--------, follow up as outpatient with the GI clinic and take your medication as instructed, if you develop shortness of breath, bloody bowel movement, bloody vomiting or black stool get evaluation from the emergency department. PRINCIPAL DISCHARGE DIAGNOSIS  Diagnosis: GI bleeding  Assessment and Plan of Treatment: You had bloody bowel movements, you had anemia and were transfused blood.  Your hemoglobin has remaned stable and your symptoms resolved. You had an upper and lower endoscopy which showed inflammation on the esophagus and duedenum and diverticulosis in the colon. You need to take your medcations as prescribed and to follow up as outpatient with your primary doctor with a new CBC report to monitor the hemoglobin, and with the GI clinic for possible further evaluation with capsule endoscopy. You should maintain a high fiber diet of fresh fruits and vegetables. If you develop shortness of breath, bloody bowel movement, bloody vomiting or black stool get evaluation from the emergency department.

## 2019-10-22 NOTE — DISCHARGE NOTE PROVIDER - CARE PROVIDER_API CALL
Prudencio Robles (DO)  Gastroenterology  [address] [place] [id number]  Phone: [phone]  Fax: [phone]  Follow Up Time: 2 weeks Prudencio Robles (DO)  Gastroenterology  [address] [place] [address]  Phone: [phone]  Fax: [phone]  Follow Up Time: 2 weeks    Gildardo Bill)  [address] [place], [place] [id number]  [address] [place] [address]  Phone: [phone]  Fax: [phone]  Follow Up Time: 1 week

## 2019-10-22 NOTE — PROGRESS NOTE ADULT - ASSESSMENT
A 73 y old female patient with pmhx of Gout was taking colchicine and prednisone (the last 7 days), hx of gastritis as per pt had an EGD by Dr Robles in July this year now patient complaining of black stools since 1 week    Acute Anemia: R/o UGIB vs LGIB,  Hbg 5.5 on presentation s/p 3 U PRBC, currently stable 8.8  -s/p EGD >> esophagitis, duodenitis >> no active bleeding  -for colonoscopy today, if neg and no active bleeding can go home and to f/u as outpatinet for capsule study   -monitor CBC, Transfuse if Hbg < 7.5,   -c/w IV Protonix 40 BID      Dyspnea: symptomatic anemia >> resolved   - Aortic Stenosis, sable   -CXR negative for any cardiopulmonary disease, -BNP 1017 (Patient obese)  - ECHO: type 1 diastolic DYSFUNCTION,   severe AS,   -no signs of fluid overload,    CKD stage 3: Stable     H/O Gout: Stable  -c/w colchicine and prednisone PRN    H/O Essential HTN: well controlled   -Metoprolol succinate 25mg daily    DVT - Sequential(GI bleed)  GI -Protonix Push BID  FULL CODE	  Dispo: from home  -Pending Outcome of colonoscopy result

## 2019-10-22 NOTE — DIETITIAN INITIAL EVALUATION ADULT. - ADD RECOMMEND
advance diet to renal restrictions + DASH/TLC as medically feasible s/p EGD, encourage po intake, provide assistance with meals PRN

## 2019-10-22 NOTE — DISCHARGE NOTE PROVIDER - HOSPITAL COURSE
72 Y/O F w/ hx of gout, anemia, rheumatoid arthritis, PUD, HTN, Aortic stenosis, CAD p/w SOB started 5 days ago after few episodes of BRBPR followed by dark stools four days prior to presentation Patient states she has had exertional SOB, also orthopnea x 5 days,, in ED HB was 5.5, received 3 units of PRBC, and admitted, shhe underwent EGD sohwied no active bleeding but +ve esophagitis and duodenitis, she had colonscopy and shwoed ---------, her hemoglobin has been stable with no firther bloody BM or Melena, she will be discharged and to follow up with GI as outpatient. 74 Y/O F w/ hx of gout, anemia, rheumatoid arthritis, PUD, HTN, Aortic stenosis, CAD p/w SOB started 5 days ago after few episodes of BRBPR followed by dark stools four days prior to presentation Patient states she has had exertional SOB, also orthopnea x 5 days,, in ED HB was 5.5, received 3 units of PRBC, and admitted, shhe underwent EGD sohwied no active bleeding but +ve esophagitis and duodenitis, she had colonoscopy today and was cleared for discharge by GI. her hemoglobin has been stable with no further bloody BM or Melena, she will be discharged and to follow up with GI as outpatient. 74 Y/O F w/ hx of gout, anemia, rheumatoid arthritis, PUD, HTN, Aortic stenosis, CAD p/w SOB started 5 days ago after few episodes of BRBPR followed by dark stools four days prior to presentation Patient states she has had exertional SOB, also orthopnea x 5 days,, in ED HB was 5.5, received 3 units of PRBC, and admitted, shhe underwent EGD sohwied no active bleeding but +ve esophagitis and duodenitis, she had colonoscopy today and was cleared for discharge by GI. her hemoglobin has been stable with no further bloody BM or Melena. Patient wants to go home and cleared for d/c by Gastroenterology and hospitalist on-call Dr. James. She will be discharged and will follow up with her PCP and GI as outpatient.

## 2019-10-22 NOTE — DIETITIAN INITIAL EVALUATION ADULT. - RD TO REMAIN AVAILABLE
yes/INTERVENTION: meals and snacks, coordination of care. ME: RD to monitor diet order, energy intake, body composition, NFPF, renal profile

## 2019-10-22 NOTE — CHART NOTE - NSCHARTNOTEFT_GEN_A_CORE
Upon Nutritional Assessment by the Registered Dietitian your patient was determined to meet criteria / has evidence of the following diagnosis/diagnoses:          [ ]  Mild Protein Calorie Malnutrition        [ ]  Moderate Protein Calorie Malnutrition        [ ] Severe Protein Calorie Malnutrition        [ ] Unspecified Protein Calorie Malnutrition        [ ] Underweight / BMI <19        [x] Morbid Obesity / BMI > 40      Findings as based on:  •  Comprehensive nutrition assessment and consultation    Height: 67"  Weight: 267 lbs  BMI: 41.8    Treatment:  advance diet to renal restrictions + DASH/TLC as medically feasible s/p EGD, encourage po intake, provide assistance with meals PRN, nutrition education for weight loss/maintenance      The following diet has been recommended: renal restrictions + DASH/TLC      PROVIDER Section:     By signing this assessment you are acknowledging and agree with the diagnosis/diagnoses assigned by the Registered Dietitian    Comments:

## 2019-10-22 NOTE — DISCHARGE NOTE PROVIDER - NSDCCAREPROVSEEN_GEN_ALL_CORE_FT
Jaime Mancia Team at [facility] Jaime Mancia  GI Team at [facility]  Roma James

## 2019-10-22 NOTE — DIETITIAN INITIAL EVALUATION ADULT. - OTHER INFO
Pt admitted d/t upper GI bleed and anemia. Pt with CKD-3. Pt has +1 edema to the R leg (10/21). Skin assessment on 10/22 shows that skin is intact. Pt to have EGD today (10/22) and is currently NPO. Pt was on clear liquids. Pt reports that she was able to tolerate clear liquids without any nausea/abdominal pain. Pt reports that pta, she ate regular texture/consistency foods, and would sometimes experience symptoms of reflux. Pt reports that she does not actively avoid foods that cause reflux. Discussed some nutrition therapy for this issue and pt verbalizes understanding. Pt reports that she has not had a regular BM since last week. Pt had a BM on 10/21 and was documented to have perianal bleeding. Pt denies any chewing/swallowing difficulty. Pt does not take any vitamins/supplements pta. Pt has NKFA/intolerances. Pt does not have any food preferences r/t culture/Sabianism. Pt denies any recent changes in wt and reports a stable UBW of ~240 lbs. Dosing wt from 10/21 is 125 kg/275.5 lbs vs 10/22 121 kg/267 lbs. Not consistent with reported stable wt, however no wt loss of note. Pt does not show any physical signs of muscle wasting/fat loss upon RD observation.

## 2019-10-22 NOTE — PROGRESS NOTE ADULT - REASON FOR ADMISSION
Bleeding per rectum and shortness of breath

## 2019-10-22 NOTE — DISCHARGE NOTE NURSING/CASE MANAGEMENT/SOCIAL WORK - PATIENT PORTAL LINK FT
You can access the FollowMyHealth Patient Portal offered by [facility] by registering at the following website: [email or web address]. By joining [facility]’s FollowMyHealth portal, you will also be able to view your health information using other applications (apps) compatible with our system.

## 2019-10-22 NOTE — DISCHARGE NOTE PROVIDER - CARE PROVIDERS DIRECT ADDRESSES
,DirectAddress_Unknown ,DirectAddress_Unknown,[email or web address]

## 2019-10-22 NOTE — DIETITIAN INITIAL EVALUATION ADULT. - ENERGY NEEDS
kcal: [id number] (MSJ x 1-1.1 AF) Obese BMI  protein: 46-61 g (.75-1 g/kg IBW) Obese BMI + CKD 3 considered- aim low and monitor renal rxn  fluid: 1mL/kcal or per LIP

## 2019-10-22 NOTE — PROGRESS NOTE ADULT - ASSESSMENT
A 72 yo female with PMH of HTN, CAD, rheumatoid arthritis, PUD, Aortic stenosis came to ED c/o SOB and dark stool, symptoms started 5 days ago with bright red stool then she noticed black stool and started with SOB and orthopnea, she denies chest pain, cough, fever or chills, she has no abdominal pain, no diarrhea.  In the ED, vital signs were stable, Labs showed Hb 5.4, CXR was clear, she received 3 RBCs and admitted to the hospital. Patient was started on clear liquid diet and Protonix drip.      A/P:   ## Acute blood loss anemia, likely from GI bleeding  Hb 5.5, s/p 3x PRBC Tx >>> 8.8 >>9.0  today.   baseline 11   hemodynamically stable, monitor H/H daily.   s/p  EGD yesterday >> awaiting colonoscope today.   Continue Pantoprazole IV BID.     SOB: likely from acute symptomatic anemia  Hx of AS, Pro-BNP mildly elevated   CXR repeated after blood transfusion, showed vascular congestion.    added  Lasix 20mg daily.   Caution with IV fluid and blood transfusion.     HTN:   Continue Metoprolol    CKD stage 3:   Cr 2.0 around baseline 1.8 .     #Progress Note Handoff:  Pending (specify): Colonoscope / ? Capsule endoscope ///  Stable H/H.   Family discussion: JHONNY, d/w the patient.  Disposition: Home.

## 2019-10-22 NOTE — PRE-ANESTHESIA EVALUATION ADULT - MALLAMPATI CLASS
Class I (easy) - visualization of the soft palate, fauces, uvula, and both anterior and posterior pillars
Class III - visualization of the soft palate and the base of the uvula

## 2019-10-25 DIAGNOSIS — Z88.0 ALLERGY STATUS TO PENICILLIN: ICD-10-CM

## 2019-10-25 DIAGNOSIS — Z87.11 PERSONAL HISTORY OF PEPTIC ULCER DISEASE: ICD-10-CM

## 2019-10-25 DIAGNOSIS — K57.30 DIVERTICULOSIS OF LARGE INTESTINE WITHOUT PERFORATION OR ABSCESS WITHOUT BLEEDING: ICD-10-CM

## 2019-10-25 DIAGNOSIS — K64.1 SECOND DEGREE HEMORRHOIDS: ICD-10-CM

## 2019-10-25 DIAGNOSIS — K20.8 OTHER ESOPHAGITIS: ICD-10-CM

## 2019-10-25 DIAGNOSIS — M10.9 GOUT, UNSPECIFIED: ICD-10-CM

## 2019-10-25 DIAGNOSIS — D62 ACUTE POSTHEMORRHAGIC ANEMIA: ICD-10-CM

## 2019-10-25 DIAGNOSIS — I35.0 NONRHEUMATIC AORTIC (VALVE) STENOSIS: ICD-10-CM

## 2019-10-25 DIAGNOSIS — N18.3 CHRONIC KIDNEY DISEASE, STAGE 3 (MODERATE): ICD-10-CM

## 2019-10-25 DIAGNOSIS — K29.80 DUODENITIS WITHOUT BLEEDING: ICD-10-CM

## 2019-10-25 DIAGNOSIS — I25.10 ATHEROSCLEROTIC HEART DISEASE OF NATIVE CORONARY ARTERY WITHOUT ANGINA PECTORIS: ICD-10-CM

## 2019-10-25 DIAGNOSIS — L30.9 DERMATITIS, UNSPECIFIED: ICD-10-CM

## 2019-10-25 DIAGNOSIS — K92.2 GASTROINTESTINAL HEMORRHAGE, UNSPECIFIED: ICD-10-CM

## 2019-10-25 DIAGNOSIS — Z87.891 PERSONAL HISTORY OF NICOTINE DEPENDENCE: ICD-10-CM

## 2019-10-25 DIAGNOSIS — M06.9 RHEUMATOID ARTHRITIS, UNSPECIFIED: ICD-10-CM

## 2019-10-25 DIAGNOSIS — E66.01 MORBID (SEVERE) OBESITY DUE TO EXCESS CALORIES: ICD-10-CM

## 2019-10-25 DIAGNOSIS — K31.89 OTHER DISEASES OF STOMACH AND DUODENUM: ICD-10-CM

## 2019-10-25 DIAGNOSIS — R01.1 CARDIAC MURMUR, UNSPECIFIED: ICD-10-CM

## 2019-10-25 DIAGNOSIS — Z91.038 OTHER INSECT ALLERGY STATUS: ICD-10-CM

## 2019-10-25 DIAGNOSIS — I12.9 HYPERTENSIVE CHRONIC KIDNEY DISEASE WITH STAGE 1 THROUGH STAGE 4 CHRONIC KIDNEY DISEASE, OR UNSPECIFIED CHRONIC KIDNEY DISEASE: ICD-10-CM

## 2019-10-31 ENCOUNTER — OUTPATIENT (OUTPATIENT)
Dept: OUTPATIENT SERVICES | Facility: HOSPITAL | Age: 74
LOS: 1 days | Discharge: HOME | End: 2019-10-31

## 2019-10-31 DIAGNOSIS — Z87.19 PERSONAL HISTORY OF OTHER DISEASES OF THE DIGESTIVE SYSTEM: Chronic | ICD-10-CM

## 2019-10-31 DIAGNOSIS — D64.9 ANEMIA, UNSPECIFIED: ICD-10-CM

## 2019-10-31 DIAGNOSIS — R79.89 OTHER SPECIFIED ABNORMAL FINDINGS OF BLOOD CHEMISTRY: ICD-10-CM

## 2019-11-06 ENCOUNTER — OUTPATIENT (OUTPATIENT)
Dept: OUTPATIENT SERVICES | Facility: HOSPITAL | Age: 74
LOS: 1 days | Discharge: HOME | End: 2019-11-06

## 2019-11-06 DIAGNOSIS — Z87.19 PERSONAL HISTORY OF OTHER DISEASES OF THE DIGESTIVE SYSTEM: Chronic | ICD-10-CM

## 2019-11-07 DIAGNOSIS — R79.89 OTHER SPECIFIED ABNORMAL FINDINGS OF BLOOD CHEMISTRY: ICD-10-CM

## 2019-11-07 DIAGNOSIS — D64.9 ANEMIA, UNSPECIFIED: ICD-10-CM

## 2019-11-14 ENCOUNTER — OUTPATIENT (OUTPATIENT)
Dept: OUTPATIENT SERVICES | Facility: HOSPITAL | Age: 74
LOS: 1 days | Discharge: HOME | End: 2019-11-14

## 2019-11-14 DIAGNOSIS — N18.2 CHRONIC KIDNEY DISEASE, STAGE 2 (MILD): ICD-10-CM

## 2019-11-14 DIAGNOSIS — Z87.19 PERSONAL HISTORY OF OTHER DISEASES OF THE DIGESTIVE SYSTEM: Chronic | ICD-10-CM

## 2019-11-14 DIAGNOSIS — D64.9 ANEMIA, UNSPECIFIED: ICD-10-CM

## 2019-11-15 ENCOUNTER — EMERGENCY (EMERGENCY)
Facility: HOSPITAL | Age: 74
LOS: 0 days | Discharge: HOME | End: 2019-11-16
Attending: EMERGENCY MEDICINE | Admitting: EMERGENCY MEDICINE
Payer: MEDICARE

## 2019-11-15 VITALS
RESPIRATION RATE: 20 BRPM | TEMPERATURE: 97 F | HEIGHT: 67 IN | SYSTOLIC BLOOD PRESSURE: 132 MMHG | OXYGEN SATURATION: 98 % | WEIGHT: 240.08 LBS | DIASTOLIC BLOOD PRESSURE: 101 MMHG | HEART RATE: 83 BPM

## 2019-11-15 DIAGNOSIS — D64.9 ANEMIA, UNSPECIFIED: ICD-10-CM

## 2019-11-15 DIAGNOSIS — Z87.891 PERSONAL HISTORY OF NICOTINE DEPENDENCE: ICD-10-CM

## 2019-11-15 DIAGNOSIS — Z87.19 PERSONAL HISTORY OF OTHER DISEASES OF THE DIGESTIVE SYSTEM: Chronic | ICD-10-CM

## 2019-11-15 DIAGNOSIS — I10 ESSENTIAL (PRIMARY) HYPERTENSION: ICD-10-CM

## 2019-11-15 LAB
ALBUMIN SERPL ELPH-MCNC: 3.6 G/DL — SIGNIFICANT CHANGE UP (ref 3.5–5.2)
ALP SERPL-CCNC: 78 U/L — SIGNIFICANT CHANGE UP (ref 30–115)
ALT FLD-CCNC: 5 U/L — SIGNIFICANT CHANGE UP (ref 0–41)
ANION GAP SERPL CALC-SCNC: 11 MMOL/L — SIGNIFICANT CHANGE UP (ref 7–14)
APTT BLD: 21 SEC — CRITICAL LOW (ref 27–39.2)
AST SERPL-CCNC: 17 U/L — SIGNIFICANT CHANGE UP (ref 0–41)
BASE EXCESS BLDV CALC-SCNC: -5.3 MMOL/L — LOW (ref -2–2)
BASOPHILS # BLD AUTO: 0.01 K/UL — SIGNIFICANT CHANGE UP (ref 0–0.2)
BASOPHILS NFR BLD AUTO: 0.2 % — SIGNIFICANT CHANGE UP (ref 0–1)
BILIRUB SERPL-MCNC: 0.3 MG/DL — SIGNIFICANT CHANGE UP (ref 0.2–1.2)
BUN SERPL-MCNC: 35 MG/DL — HIGH (ref 10–20)
CA-I SERPL-SCNC: 1.25 MMOL/L — SIGNIFICANT CHANGE UP (ref 1.12–1.3)
CALCIUM SERPL-MCNC: 8.9 MG/DL — SIGNIFICANT CHANGE UP (ref 8.5–10.1)
CHLORIDE SERPL-SCNC: 107 MMOL/L — SIGNIFICANT CHANGE UP (ref 98–110)
CO2 SERPL-SCNC: 20 MMOL/L — SIGNIFICANT CHANGE UP (ref 17–32)
CREAT SERPL-MCNC: 2.1 MG/DL — HIGH (ref 0.7–1.5)
EOSINOPHIL # BLD AUTO: 0 K/UL — SIGNIFICANT CHANGE UP (ref 0–0.7)
EOSINOPHIL NFR BLD AUTO: 0 % — SIGNIFICANT CHANGE UP (ref 0–8)
GAS PNL BLDV: 143 MMOL/L — SIGNIFICANT CHANGE UP (ref 136–145)
GAS PNL BLDV: SIGNIFICANT CHANGE UP
GLUCOSE SERPL-MCNC: 108 MG/DL — HIGH (ref 70–99)
HCO3 BLDV-SCNC: 20 MMOL/L — LOW (ref 22–29)
HCT VFR BLD CALC: 21.9 % — LOW (ref 37–47)
HCT VFR BLDA CALC: 13 % — LOW (ref 34–44)
HGB BLD CALC-MCNC: 4.2 G/DL — CRITICAL LOW (ref 14–18)
HGB BLD-MCNC: 6.5 G/DL — CRITICAL LOW (ref 12–16)
HOROWITZ INDEX BLDV+IHG-RTO: 21 — SIGNIFICANT CHANGE UP
IMM GRANULOCYTES NFR BLD AUTO: 0.5 % — HIGH (ref 0.1–0.3)
INR BLD: 1.09 RATIO — SIGNIFICANT CHANGE UP (ref 0.65–1.3)
LACTATE BLDV-MCNC: 1.2 MMOL/L — SIGNIFICANT CHANGE UP (ref 0.5–1.6)
LYMPHOCYTES # BLD AUTO: 0.65 K/UL — LOW (ref 1.2–3.4)
LYMPHOCYTES # BLD AUTO: 11.1 % — LOW (ref 20.5–51.1)
MCHC RBC-ENTMCNC: 25.1 PG — LOW (ref 27–31)
MCHC RBC-ENTMCNC: 29.7 G/DL — LOW (ref 32–37)
MCV RBC AUTO: 84.6 FL — SIGNIFICANT CHANGE UP (ref 81–99)
MONOCYTES # BLD AUTO: 0.4 K/UL — SIGNIFICANT CHANGE UP (ref 0.1–0.6)
MONOCYTES NFR BLD AUTO: 6.8 % — SIGNIFICANT CHANGE UP (ref 1.7–9.3)
NEUTROPHILS # BLD AUTO: 4.75 K/UL — SIGNIFICANT CHANGE UP (ref 1.4–6.5)
NEUTROPHILS NFR BLD AUTO: 81.4 % — HIGH (ref 42.2–75.2)
NRBC # BLD: 0 /100 WBCS — SIGNIFICANT CHANGE UP (ref 0–0)
PCO2 BLDV: 38 MMHG — LOW (ref 41–51)
PH BLDV: 7.34 — SIGNIFICANT CHANGE UP (ref 7.26–7.43)
PLATELET # BLD AUTO: 161 K/UL — SIGNIFICANT CHANGE UP (ref 130–400)
PO2 BLDV: 38 MMHG — SIGNIFICANT CHANGE UP (ref 20–40)
POTASSIUM BLDV-SCNC: 5.2 MMOL/L — SIGNIFICANT CHANGE UP (ref 3.3–5.6)
POTASSIUM SERPL-MCNC: 5.7 MMOL/L — HIGH (ref 3.5–5)
POTASSIUM SERPL-SCNC: 5.7 MMOL/L — HIGH (ref 3.5–5)
PROT SERPL-MCNC: 6.9 G/DL — SIGNIFICANT CHANGE UP (ref 6–8)
PROTHROM AB SERPL-ACNC: 12.5 SEC — SIGNIFICANT CHANGE UP (ref 9.95–12.87)
RBC # BLD: 2.59 M/UL — LOW (ref 4.2–5.4)
RBC # FLD: 17.8 % — HIGH (ref 11.5–14.5)
SAO2 % BLDV: 73 % — SIGNIFICANT CHANGE UP
SODIUM SERPL-SCNC: 138 MMOL/L — SIGNIFICANT CHANGE UP (ref 135–146)
WBC # BLD: 5.84 K/UL — SIGNIFICANT CHANGE UP (ref 4.8–10.8)
WBC # FLD AUTO: 5.84 K/UL — SIGNIFICANT CHANGE UP (ref 4.8–10.8)

## 2019-11-15 PROCEDURE — 99284 EMERGENCY DEPT VISIT MOD MDM: CPT

## 2019-11-15 NOTE — ED PROVIDER NOTE - NS ED ROS FT
Review of Systems:  	•	CONSTITUTIONAL: no fever, no diaphoresis, no chills  	•	SKIN: no rash  	•	HEMATOLOGIC: no bruising  	•	EYES: no blurry vision   	•	ENT: no tinnitus   	•	RESPIRATORY: no shortness of breath  	•	CARDIAC: no chest pain, no palpitations  	•	GI: no abd pain, no nausea, no vomiting, no diarrhea, no dark stools, no hemoptysis, no hematochezia  	•	GENITO-URINARY: no dysuria, no hematuria, no increased urinary frequency   	•	MUSCULOSKELETAL: no joint paint, no swelling, no redness  	•	NEUROLOGIC: no weakness, no headache, no paresthesias, no LOC  	•	PSYCH: no anxiety, non suicidal, non homicidal, no hallucination, no depression

## 2019-11-15 NOTE — ED ADULT NURSE REASSESSMENT NOTE - NS ED NURSE REASSESS COMMENT FT1
Spoke with blood bank [id number], prescribed PRBCs not ready at this time, will call when ready for . [facility] aware.

## 2019-11-15 NOTE — ED CDU PROVIDER INITIAL DAY NOTE - PHYSICAL EXAMINATION
CONST: Well appearing in NAD  EYES: PERRL, EOMI, Sclera and conjunctiva clear.   ENT: Oropharynx normal appearing, no erythema or exudates. Uvula midline.   NECK: Non-tender, normal ROM   CARD: Normal S1 S2; Normal rate and rhythm  RESP: Equal BS B/L, No wheezes, rhonchi or rales. No respiratory distress. Speaking full sentences.   GI: Soft, non-tender, non-distended. no rebound or guarding. no cva tenderness.   MS: Normal ROM in all extremities. No midline spinal tenderness. no calf tenderness or swelling.  SKIN: Warm, dry, no acute rashes. Good turgor. No pallor.   NEURO: A&Ox3, No focal deficits. CN II-XII intact. Strength 5/5 with no sensory deficits. Steady gait.

## 2019-11-15 NOTE — ED PROVIDER NOTE - OBJECTIVE STATEMENT
74 year old female, hx HTN, sent in by PMD for possible blood transfusion after found to have low hemoglobin. Patient had dark stools x3 weeks ago with associated SOB, found to have hgb ~5, had 3 units PRBCs. Patient was seen by PMD yesterday, had CBC and found to have hgb 7, sent to ED for possible transfusions. Denies fever, chills, hemoptysis, dark stools, SOB, chest pain, presyncopal symptoms, syncope. Last endoscopy and colonoscopy x1 week ago. Patient with scheduled endoscopy x5 days for further evaluation.

## 2019-11-15 NOTE — ED PROVIDER NOTE - ATTENDING CONTRIBUTION TO CARE
I personally evaluated patient. I agree with the findings and plan with all documentation on chart except as documented  in my note.    74 year old female, hx HTN, sent in by PMD for possible blood transfusion after found to have low hemoglobin. Patient had dark stools x3 weeks ago with associated SOB, found to have hgb ~5, had 3 units pRBCs. Patient was seen by PMD yesterday, had CBC and found to have hgb 7, sent to ED for possible transfusions. Denies fever, chills, hemoptysis, dark stools, SOB, chest pain, presyncopal symptoms, syncope. Last endoscopy and colonoscopy x1 week ago that was unremarkable for source of bleeding.  Patient is scheduled for continued GI work up.    On exam, VS reviewed.  Patient has mild conjunctival pallor.  Lungs and heart sounds normal and no signs of bleeding.  IV placed, labs sent and Hb checked. Hb 6-7.  Will send to OBS status for transfusion.

## 2019-11-15 NOTE — ED CDU PROVIDER INITIAL DAY NOTE - SHIFT CHANGE DETAILS
Dev- case s/o to me from  being blood transfusion. Might have Ab- will need time to get crossmatched blood.

## 2019-11-15 NOTE — ED ADULT NURSE NOTE - NSIMPLEMENTINTERV_GEN_ALL_ED
Implemented All Universal Safety Interventions:  [place] to call system. Call bell, personal items and telephone within reach. Instruct patient to call for assistance. Room bathroom lighting operational. Non-slip footwear when patient is off stretcher. Physically safe environment: no spills, clutter or unnecessary equipment. Stretcher in lowest position, wheels locked, appropriate side rails in place.

## 2019-11-16 VITALS
OXYGEN SATURATION: 97 % | DIASTOLIC BLOOD PRESSURE: 53 MMHG | HEART RATE: 77 BPM | TEMPERATURE: 96 F | SYSTOLIC BLOOD PRESSURE: 142 MMHG | RESPIRATION RATE: 17 BRPM

## 2019-11-16 LAB
HCT VFR BLD CALC: 27.9 % — LOW (ref 37–47)
HGB BLD-MCNC: 8.4 G/DL — LOW (ref 12–16)
MCHC RBC-ENTMCNC: 25.5 PG — LOW (ref 27–31)
MCHC RBC-ENTMCNC: 30.1 G/DL — LOW (ref 32–37)
MCV RBC AUTO: 84.5 FL — SIGNIFICANT CHANGE UP (ref 81–99)
NRBC # BLD: 0 /100 WBCS — SIGNIFICANT CHANGE UP (ref 0–0)
PLATELET # BLD AUTO: 162 K/UL — SIGNIFICANT CHANGE UP (ref 130–400)
RBC # BLD: 3.3 M/UL — LOW (ref 4.2–5.4)
RBC # FLD: 18.1 % — HIGH (ref 11.5–14.5)
WBC # BLD: 6.49 K/UL — SIGNIFICANT CHANGE UP (ref 4.8–10.8)
WBC # FLD AUTO: 6.49 K/UL — SIGNIFICANT CHANGE UP (ref 4.8–10.8)

## 2019-11-16 NOTE — ED CDU PROVIDER DISPOSITION NOTE - CLINICAL COURSE
74 year old female, hx HTN, sent in by PMD for blood transfusion. Hb noted to be 6.5, no cp, no hypoxia. Consent obtained, transfused 2 units. Repeat cbc wnl, dc home with pmd and GI f/u. Pt is currently getting a full workup for her anemia, due to a pill endoscopy on f.u GI visit. Plan discussed with pt, who agreed. DC home.

## 2019-11-16 NOTE — ED CDU PROVIDER DISPOSITION NOTE - CARE PROVIDERS DIRECT ADDRESSES
[email or web address]

## 2019-11-16 NOTE — ED CDU PROVIDER DISPOSITION NOTE - CARE PROVIDER_API CALL
Gildardo Bill)  [address] [facility]  [address] [place] [address]  Phone: [phone]  Fax: [phone]  Established Patient  Follow Up Time: 1-3 Days

## 2019-11-16 NOTE — ED CDU PROVIDER DISPOSITION NOTE - NSFOLLOWUPINSTRUCTIONS_ED_ALL_ED_FT
Blood Transfusion, Adult, Care After  This sheet gives you information about how to care for yourself after your procedure. Your health care provider may also give you more specific instructions. If you have problems or questions, contact your health care provider.  What can I expect after the procedure?  After your procedure, it is common to have:  Bruising and soreness where the IV tube was inserted.Headache.Follow these instructions at home:  Image   Take over-the-counter and prescription medicines only as told by your health care provider.Return to your normal activities as told by your health care provider.Follow instructions from your health care provider about how to take care of your IV insertion site. Make sure you:  Wash your hands with soap and water before you change your bandage (dressing). If soap and water are not available, use hand .Change your dressing as told by your health care provider.Check your IV insertion site every day for signs of infection. Check for:  More redness, swelling, or pain.More fluid or blood.Warmth.Pus or a bad smell.Contact a health care provider if:  You have more redness, swelling, or pain around the IV insertion site.You have more fluid or blood coming from the IV insertion site.Your IV insertion site feels warm to the touch.You have pus or a bad smell coming from the IV insertion site.Your urine turns pink, red, or brown.You feel weak after doing your normal activities.Get help right away if:  You have signs of a serious allergic or immune system reaction, including:  Itchiness.Hives.Trouble breathing.Anxiety.Chest or lower back pain.Fever, flushing, and chills.Rapid pulse.Rash.Diarrhea.Vomiting.Dark urine.Serious headache.Dizziness.Stiff neck.Yellow coloration of the face or the white parts of the eyes (jaundice).This information is not intended to replace advice given to you by your health care provider. Make sure you discuss any questions you have with your health care provider.

## 2019-11-20 ENCOUNTER — OUTPATIENT (OUTPATIENT)
Dept: OUTPATIENT SERVICES | Facility: HOSPITAL | Age: 74
LOS: 1 days | Discharge: HOME | End: 2019-11-20

## 2019-11-20 DIAGNOSIS — D64.9 ANEMIA, UNSPECIFIED: ICD-10-CM

## 2019-11-20 DIAGNOSIS — Z87.19 PERSONAL HISTORY OF OTHER DISEASES OF THE DIGESTIVE SYSTEM: Chronic | ICD-10-CM

## 2019-11-20 LAB — BLD GP AB SCN SERPL QL: SIGNIFICANT CHANGE UP

## 2019-11-21 ENCOUNTER — OUTPATIENT (OUTPATIENT)
Dept: OUTPATIENT SERVICES | Facility: HOSPITAL | Age: 74
LOS: 1 days | Discharge: HOME | End: 2019-11-21

## 2019-11-21 VITALS
HEART RATE: 67 BPM | TEMPERATURE: 98 F | RESPIRATION RATE: 18 BRPM | WEIGHT: 229.94 LBS | SYSTOLIC BLOOD PRESSURE: 138 MMHG | DIASTOLIC BLOOD PRESSURE: 70 MMHG | HEIGHT: 67 IN

## 2019-11-21 DIAGNOSIS — Z87.19 PERSONAL HISTORY OF OTHER DISEASES OF THE DIGESTIVE SYSTEM: Chronic | ICD-10-CM

## 2019-11-26 ENCOUNTER — OUTPATIENT (OUTPATIENT)
Dept: OUTPATIENT SERVICES | Facility: HOSPITAL | Age: 74
LOS: 1 days | Discharge: HOME | End: 2019-11-26

## 2019-11-26 DIAGNOSIS — Z87.19 PERSONAL HISTORY OF OTHER DISEASES OF THE DIGESTIVE SYSTEM: Chronic | ICD-10-CM

## 2019-11-26 DIAGNOSIS — D64.9 ANEMIA, UNSPECIFIED: ICD-10-CM

## 2019-12-06 ENCOUNTER — OUTPATIENT (OUTPATIENT)
Dept: OUTPATIENT SERVICES | Facility: HOSPITAL | Age: 74
LOS: 1 days | Discharge: HOME | End: 2019-12-06

## 2019-12-06 DIAGNOSIS — Z87.19 PERSONAL HISTORY OF OTHER DISEASES OF THE DIGESTIVE SYSTEM: Chronic | ICD-10-CM

## 2019-12-06 DIAGNOSIS — D64.9 ANEMIA, UNSPECIFIED: ICD-10-CM

## 2019-12-09 DIAGNOSIS — I10 ESSENTIAL (PRIMARY) HYPERTENSION: ICD-10-CM

## 2019-12-09 DIAGNOSIS — K55.20 ANGIODYSPLASIA OF COLON WITHOUT HEMORRHAGE: ICD-10-CM

## 2019-12-09 DIAGNOSIS — D64.9 ANEMIA, UNSPECIFIED: ICD-10-CM

## 2019-12-09 DIAGNOSIS — K57.90 DIVERTICULOSIS OF INTESTINE, PART UNSPECIFIED, WITHOUT PERFORATION OR ABSCESS WITHOUT BLEEDING: ICD-10-CM

## 2019-12-09 DIAGNOSIS — R01.1 CARDIAC MURMUR, UNSPECIFIED: ICD-10-CM

## 2019-12-09 DIAGNOSIS — Z91.040 LATEX ALLERGY STATUS: ICD-10-CM

## 2019-12-09 DIAGNOSIS — Z88.0 ALLERGY STATUS TO PENICILLIN: ICD-10-CM

## 2019-12-19 ENCOUNTER — OUTPATIENT (OUTPATIENT)
Dept: OUTPATIENT SERVICES | Facility: HOSPITAL | Age: 74
LOS: 1 days | Discharge: HOME | End: 2019-12-19

## 2019-12-19 DIAGNOSIS — Z87.19 PERSONAL HISTORY OF OTHER DISEASES OF THE DIGESTIVE SYSTEM: Chronic | ICD-10-CM

## 2019-12-19 DIAGNOSIS — R68.89 OTHER GENERAL SYMPTOMS AND SIGNS: ICD-10-CM

## 2019-12-19 DIAGNOSIS — D72.829 ELEVATED WHITE BLOOD CELL COUNT, UNSPECIFIED: ICD-10-CM

## 2020-01-07 ENCOUNTER — INPATIENT (INPATIENT)
Facility: HOSPITAL | Age: 75
LOS: 2 days | Discharge: HOME | End: 2020-01-10
Attending: INTERNAL MEDICINE | Admitting: INTERNAL MEDICINE
Payer: MEDICARE

## 2020-01-07 VITALS
WEIGHT: 235.01 LBS | HEIGHT: 67 IN | HEART RATE: 88 BPM | SYSTOLIC BLOOD PRESSURE: 123 MMHG | TEMPERATURE: 98 F | OXYGEN SATURATION: 97 % | DIASTOLIC BLOOD PRESSURE: 65 MMHG | RESPIRATION RATE: 20 BRPM

## 2020-01-07 DIAGNOSIS — Z87.19 PERSONAL HISTORY OF OTHER DISEASES OF THE DIGESTIVE SYSTEM: Chronic | ICD-10-CM

## 2020-01-07 LAB
ALBUMIN SERPL ELPH-MCNC: 3.8 G/DL — SIGNIFICANT CHANGE UP (ref 3.5–5.2)
ALP SERPL-CCNC: 83 U/L — SIGNIFICANT CHANGE UP (ref 30–115)
ALT FLD-CCNC: <5 U/L — SIGNIFICANT CHANGE UP (ref 0–41)
ANION GAP SERPL CALC-SCNC: 17 MMOL/L — HIGH (ref 7–14)
AST SERPL-CCNC: 17 U/L — SIGNIFICANT CHANGE UP (ref 0–41)
BASOPHILS # BLD AUTO: 0.02 K/UL — SIGNIFICANT CHANGE UP (ref 0–0.2)
BASOPHILS NFR BLD AUTO: 0.4 % — SIGNIFICANT CHANGE UP (ref 0–1)
BILIRUB SERPL-MCNC: 0.5 MG/DL — SIGNIFICANT CHANGE UP (ref 0.2–1.2)
BLD GP AB SCN SERPL QL: SIGNIFICANT CHANGE UP
BUN SERPL-MCNC: 24 MG/DL — HIGH (ref 10–20)
CALCIUM SERPL-MCNC: 9 MG/DL — SIGNIFICANT CHANGE UP (ref 8.5–10.1)
CHLORIDE SERPL-SCNC: 108 MMOL/L — SIGNIFICANT CHANGE UP (ref 98–110)
CO2 SERPL-SCNC: 15 MMOL/L — LOW (ref 17–32)
CREAT SERPL-MCNC: 1.8 MG/DL — HIGH (ref 0.7–1.5)
DIR ANTIGLOB POLYSPECIFIC INTERPRETATION: SIGNIFICANT CHANGE UP
EOSINOPHIL # BLD AUTO: 0.31 K/UL — SIGNIFICANT CHANGE UP (ref 0–0.7)
EOSINOPHIL NFR BLD AUTO: 6.2 % — SIGNIFICANT CHANGE UP (ref 0–8)
GLUCOSE SERPL-MCNC: 83 MG/DL — SIGNIFICANT CHANGE UP (ref 70–99)
HCT VFR BLD CALC: 22.2 % — LOW (ref 37–47)
HGB BLD-MCNC: 6.5 G/DL — CRITICAL LOW (ref 12–16)
IMM GRANULOCYTES NFR BLD AUTO: 0.4 % — HIGH (ref 0.1–0.3)
LIDOCAIN IGE QN: 52 U/L — SIGNIFICANT CHANGE UP (ref 7–60)
LYMPHOCYTES # BLD AUTO: 1.06 K/UL — LOW (ref 1.2–3.4)
LYMPHOCYTES # BLD AUTO: 21.3 % — SIGNIFICANT CHANGE UP (ref 20.5–51.1)
MCHC RBC-ENTMCNC: 22.6 PG — LOW (ref 27–31)
MCHC RBC-ENTMCNC: 28.4 G/DL — LOW (ref 32–37)
MCV RBC AUTO: 79.6 FL — LOW (ref 81–99)
MONOCYTES # BLD AUTO: 0.51 K/UL — SIGNIFICANT CHANGE UP (ref 0.1–0.6)
MONOCYTES NFR BLD AUTO: 10.2 % — HIGH (ref 1.7–9.3)
NEUTROPHILS # BLD AUTO: 3.06 K/UL — SIGNIFICANT CHANGE UP (ref 1.4–6.5)
NEUTROPHILS NFR BLD AUTO: 61.5 % — SIGNIFICANT CHANGE UP (ref 42.2–75.2)
NRBC # BLD: 0 /100 WBCS — SIGNIFICANT CHANGE UP (ref 0–0)
PLATELET # BLD AUTO: 181 K/UL — SIGNIFICANT CHANGE UP (ref 130–400)
POTASSIUM SERPL-MCNC: 4.7 MMOL/L — SIGNIFICANT CHANGE UP (ref 3.5–5)
POTASSIUM SERPL-SCNC: 4.7 MMOL/L — SIGNIFICANT CHANGE UP (ref 3.5–5)
PROT SERPL-MCNC: 7 G/DL — SIGNIFICANT CHANGE UP (ref 6–8)
RBC # BLD: 2.79 M/UL — LOW (ref 4.2–5.4)
RBC # FLD: 16.8 % — HIGH (ref 11.5–14.5)
SODIUM SERPL-SCNC: 140 MMOL/L — SIGNIFICANT CHANGE UP (ref 135–146)
TROPONIN T SERPL-MCNC: <0.01 NG/ML — SIGNIFICANT CHANGE UP
WBC # BLD: 4.98 K/UL — SIGNIFICANT CHANGE UP (ref 4.8–10.8)
WBC # FLD AUTO: 4.98 K/UL — SIGNIFICANT CHANGE UP (ref 4.8–10.8)

## 2020-01-07 PROCEDURE — 99291 CRITICAL CARE FIRST HOUR: CPT

## 2020-01-07 PROCEDURE — 71045 X-RAY EXAM CHEST 1 VIEW: CPT | Mod: 26

## 2020-01-07 PROCEDURE — 93010 ELECTROCARDIOGRAM REPORT: CPT

## 2020-01-07 RX ORDER — ACETAMINOPHEN 500 MG
650 TABLET ORAL ONCE
Refills: 0 | Status: COMPLETED | OUTPATIENT
Start: 2020-01-07 | End: 2020-01-07

## 2020-01-07 RX ADMIN — Medication 650 MILLIGRAM(S): at 19:12

## 2020-01-07 NOTE — ED PROVIDER NOTE - PHYSICAL EXAMINATION
CONSTITUTIONAL: Well-developed; well-nourished; in no acute distress.   SKIN: warm, dry  HEAD: Normocephalic; atraumatic.  EYES: Conjunctival pallor.    ENT: No nasal discharge; airway clear.  NECK: Supple; non tender.  CARD: S1, S2 normal; no murmurs, gallops, or rubs. Regular rate and rhythm.   RESP: No wheezes, rales or rhonchi.  ABD: soft ntnd; Brown stool on rectal exam, internal hemorrhoids, no fissures.   EXT: Normal ROM.  No clubbing, cyanosis or edema.   LYMPH: No acute cervical adenopathy.  NEURO: Alert, oriented, grossly unremarkable  PSYCH: Cooperative, appropriate.

## 2020-01-07 NOTE — ED PROVIDER NOTE - ATTENDING CONTRIBUTION TO CARE
74yF HTN p/w severe anemia Hgb ~6 requiring transfusion - pt admitted Nov 2019 for similar severe anemia, though to be related to GI loss, but neg workup (follows w/ Margaret Amaya).  Pt denies any overt bleeding - no melena, hematochezia or hematemesis, hematuria or other bleeding.  Reports exertional SOB over the past few days and pain to R side just below axilla - was told SOB was likely symptomatic anemia, but unclear if CP was as well.    CONSTITUTIONAL: well developed; well nourished; well appearing in no acute distress  HEAD: normocephalic; atraumatic  EYES: no conjunctival injection, no scleral icterus  ENT: no nasal discharge; airway clear.  NECK: supple; non tender. + full passive ROM in all directions  CARD: S1, S2 normal; no murmurs, gallops, or rubs. Regular rate and rhythm  RESP: no wheezes, rales or rhonchi. Good air movement bilaterally without significant accessory muscle use  ABD: soft; non-distended; non-tender. No rebound, no guarding, no pulsatile abdominal mass  EXT: moving all extremities spontaneously, normal ROM. No clubbing, cyanosis or edema  SKIN: warm and dry, no lesions noted  NEURO: alert, oriented, CN II-XII grossly intact, motor and sensory grossly intact, speech nonslurred, no focal deficits. GCS 15  PSYCH: calm, cooperative, appropriate, good eye contact, logical thought process, no apparent danger to self or others

## 2020-01-07 NOTE — H&P ADULT - HISTORY OF PRESENT ILLNESS
74 y F with PMH of HTN, presents with abnormal lab results. Went for lab work today and had hgb of 6. Hx of anemia in the past, has been worked up for GI sources of bleeding via endoscopy, colonoscopy, and capsule endoscopy without a source in October/2019. Currently denies any sx. Denies cp, sob, abd pain, melena, hematochezia, fever, chills.    In the ED, patient also complained of the Shortness of Breath and EKG was done showing new T wave inversions. 2 Units of PRBC were given and patient's SOB improved.    On my examination patient is resting comfortably in no acute distress 74 y F with PMH of HTN, presents with abnormal lab results. Went for lab work today and had hgb of 6. Hx of anemia in the past, has been worked up for GI sources of bleeding via endoscopy, colonoscopy, and capsule endoscopy without a source in October/2019. Currently denies any sx. Denies cp, sob, abd pain, melena, hematochezia, fever, chills.    In the ED, patient also complained of the Shortness of Breath and EKG was done showing new T wave inversions. 2 Units of PRBC are ordered and   patient is suppose to get it tonight. She says that when her Hb drops she starts feeling SOB with exertion.    On my examination patient is resting comfortably in no acute distress

## 2020-01-07 NOTE — ED PROVIDER NOTE - CARE PLAN
Principal Discharge DX:	Anemia  Secondary Diagnosis:	Chest pain  Secondary Diagnosis:	SOB (shortness of breath) Principal Discharge DX:	Severe anemia  Secondary Diagnosis:	Chest pain  Secondary Diagnosis:	SOB (shortness of breath)

## 2020-01-07 NOTE — ED PROVIDER NOTE - CLINICAL SUMMARY MEDICAL DECISION MAKING FREE TEXT BOX
74yF sent to the ED for Hgb<7 in the setting of known severe anemia (suspected GI source, follows with GI, neg scope).  Pt c/o exertional SOB and constant R sided chest pain, not present at prior visit for severe anemia.  EKG w/ new ST/TW changes in lateral leads and V4-6, not present on prior EKG Nov 2019.  CXR w/o ptx or pna.  Labs w/ Hgb 6 and trop <0.01.  2u pRBC ordered and pt admitted to medicine for severe symptomatic anemia requiring transfusion and chest pain (unclear if associated).

## 2020-01-07 NOTE — ED PROVIDER NOTE - CRITICAL CARE PROVIDED
consultation with other physicians/documentation/consult w/ pt's family directly relating to pts condition/additional history taking/interpretation of diagnostic studies/direct patient care (not related to procedure)

## 2020-01-07 NOTE — H&P ADULT - ATTENDING COMMENTS
A 75 yo female with PMH of HTN, chronic anemia was sent to ED for abnormal Hemoglobin. Patient is feeling dizzy, lightheaded and SOB. She denies any acute bleeding, no melena, hematochezia, hematemesis or hematuria. She was worked up in Oct 2019 for severe anemia with endoscopy, colonoscopy, and capsule endoscopy. In the ED Hb was 6.5    PHYSICAL EXAM:  GENERAL: NAD, well-developed  HEAD:  Atraumatic, Normocephalic  EYES: EOMI, PERRLA, conjunctiva and sclera clear  NECK: Supple, No JVD  CHEST/LUNG: Clear to auscultation bilaterally; No wheeze  HEART: Regular rate and rhythm; No murmurs, rubs, or gallops  ABDOMEN: Soft, Nontender, Nondistended; Bowel sounds present  EXTREMITIES:  2+ Peripheral Pulses, LE edema 2+,   PSYCH: AAOx3  NEUROLOGY: non-focal  SKIN: No rashes or lesions    A/P:   Severe recurrent anemia:   Microcytic anemia. Iron storage is low. Possible iron deficiency anemia  Recent EGD showed duodenitis, Colonoscopy showed diverticulosis.   Transfuse 2 RBCs. Send stool FOBT. If positive will need further GI workup.   Check Pelvic US.     Chronic HFpEF:   LE edema, CXR with pulmonary congestion.   Last Echo showed LVEF 68%, Severe AS  Continue Lasix and Metoprolol, caution with blood transfusion, give Lasix 40mg after transfusion.    CKD stage 4:   Unclear etiology.   Nephrology consult. Monitor BMP.

## 2020-01-07 NOTE — ED PROVIDER NOTE - NS ED ROS FT
Eyes:  No visual changes, eye pain or discharge.  ENMT:  No hearing changes, pain, discharge or infections. No neck pain or stiffness.  Cardiac:  No chest pain, SOB or edema. No chest pain with exertion.  Respiratory:  No cough or respiratory distress. No hemoptysis. No history of asthma or RAD.  GI:  No nausea, vomiting, diarrhea or abdominal pain. No melena, hematochezia.   :  No dysuria, frequency or burning.  MS:  No myalgia, muscle weakness, joint pain or back pain.  Neuro:  No headache or weakness.  No LOC.  Skin:  No skin rash.   Endocrine: No history of thyroid disease or diabetes.

## 2020-01-07 NOTE — H&P ADULT - ASSESSMENT
74 y F with PMH of HTN, presents with abnormal lab results.    Severe Microcytic Anemia  -Hb is 6.5, MCV 79  -Will get the iron studies, Vitamin B 12 and folate  -Recently in OCT/2019 was admitted to the hospital   for the same complains,    EGD - Esophagitis, duodenitis  Colonoscopy - Extensive Diverticulosis, Internal hemorrhoids  POOR PREP    Capsule Endoscopy - No bleeding spots identified in the small bowel.  Poor Colon prep  -GI Consult  -Hem-Onc Consult    HTN  -    CKD 4  -Can be a cause of severe anemia  -Stable  -Monitor CMP    Diet-  DVT - Heparin  GI -PPI  FULL CODE 74 y F with PMH of HTN, presents with abnormal lab results.    Severe Microcytic Anemia  -Hb is 6.5, MCV 79  -Will get the iron studies, Vitamin B 12 and folate  -Recently in OCT/2019 was admitted to the hospital   for the same complains,    EGD - Esophagitis, duodenitis  Colonoscopy - Extensive Diverticulosis, Internal hemorrhoids  POOR PREP    Capsule Endoscopy - No bleeding spots identified in the small bowel.  Poor Colon prep  -GI Consult  -Hem-Onc Consult    HTN  -On metoprolol lasix    CKD 4  -Can be a cause of severe anemia  -Stable  -Monitor CMP    Eczema  -Benadryl PRN    Shortness of Breath  -Due to low Hb   -New T wave inversion on the EKG  -Troponin 1st set negative.  -Repeat in the AM    Diet- NPO  DVT - Heparin  GI -PPI IV  FULL CODE

## 2020-01-07 NOTE — ED ADULT NURSE NOTE - NSIMPLEMENTINTERV_GEN_ALL_ED
Implemented All Universal Safety Interventions:  [place] to call system. Call bell, personal items and telephone within reach. Instruct patient to call for assistance. Room bathroom lighting operational. Non-slip footwear when patient is off stretcher. Physically safe environment: no spills, clutter or unnecessary equipment. Stretcher in lowest position, wheels locked, appropriate side rails in place.

## 2020-01-07 NOTE — H&P ADULT - NSHPPHYSICALEXAM_GEN_ALL_CORE
CONSTITUTIONAL: well developed; well nourished; well appearing in no acute distress  HEAD: normocephalic; atraumatic  EYES: no conjunctival injection, no scleral icterus  ENT: no nasal discharge; airway clear.  NECK: supple; non tender. + full passive ROM in all directions  CARD: S1, S2 normal; no murmurs, gallops, or rubs. Regular rate and rhythm  RESP: no wheezes, rales or rhonchi. Good air movement bilaterally without significant accessory muscle use  ABD: soft; non-distended; non-tender. No rebound, no guarding, no pulsatile abdominal mass  EXT: moving all extremities spontaneously, normal ROM. No clubbing, cyanosis or edema  SKIN: warm and dry, no lesions noted  NEURO: alert, oriented, CN II-XII grossly intact, motor and sensory grossly intact, speech non slurred, no focal deficits. GCS 15  PSYCH: calm, cooperative, appropriate, good eye contact, logical thought process, no apparent danger to self or others.

## 2020-01-08 LAB
ALBUMIN SERPL ELPH-MCNC: 4 G/DL — SIGNIFICANT CHANGE UP (ref 3.5–5.2)
ALP SERPL-CCNC: 82 U/L — SIGNIFICANT CHANGE UP (ref 30–115)
ALT FLD-CCNC: <5 U/L — SIGNIFICANT CHANGE UP (ref 0–41)
ANION GAP SERPL CALC-SCNC: 16 MMOL/L — HIGH (ref 7–14)
AST SERPL-CCNC: 12 U/L — SIGNIFICANT CHANGE UP (ref 0–41)
BILIRUB SERPL-MCNC: 0.7 MG/DL — SIGNIFICANT CHANGE UP (ref 0.2–1.2)
BUN SERPL-MCNC: 25 MG/DL — HIGH (ref 10–20)
CALCIUM SERPL-MCNC: 8.9 MG/DL — SIGNIFICANT CHANGE UP (ref 8.5–10.1)
CHLORIDE SERPL-SCNC: 108 MMOL/L — SIGNIFICANT CHANGE UP (ref 98–110)
CO2 SERPL-SCNC: 19 MMOL/L — SIGNIFICANT CHANGE UP (ref 17–32)
CREAT SERPL-MCNC: 2 MG/DL — HIGH (ref 0.7–1.5)
FERRITIN SERPL-MCNC: 8 NG/ML — LOW (ref 15–150)
FOLATE SERPL-MCNC: 9.1 NG/ML — SIGNIFICANT CHANGE UP
GLUCOSE SERPL-MCNC: 92 MG/DL — SIGNIFICANT CHANGE UP (ref 70–99)
HCT VFR BLD CALC: 21.6 % — LOW (ref 37–47)
HGB BLD-MCNC: 6 G/DL — CRITICAL LOW (ref 12–16)
IRON SATN MFR SERPL: 20 UG/DL — LOW (ref 35–150)
IRON SATN MFR SERPL: 5 % — LOW (ref 15–50)
MCHC RBC-ENTMCNC: 22.3 PG — LOW (ref 27–31)
MCHC RBC-ENTMCNC: 27.8 G/DL — LOW (ref 32–37)
MCV RBC AUTO: 80.3 FL — LOW (ref 81–99)
NRBC # BLD: 0 /100 WBCS — SIGNIFICANT CHANGE UP (ref 0–0)
PLATELET # BLD AUTO: 185 K/UL — SIGNIFICANT CHANGE UP (ref 130–400)
POTASSIUM SERPL-MCNC: 4.7 MMOL/L — SIGNIFICANT CHANGE UP (ref 3.5–5)
POTASSIUM SERPL-SCNC: 4.7 MMOL/L — SIGNIFICANT CHANGE UP (ref 3.5–5)
PROT SERPL-MCNC: 6.9 G/DL — SIGNIFICANT CHANGE UP (ref 6–8)
RBC # BLD: 2.69 M/UL — LOW (ref 4.2–5.4)
RBC # BLD: 2.69 M/UL — LOW (ref 4.2–5.4)
RBC # FLD: 16.7 % — HIGH (ref 11.5–14.5)
RETICS #: 69.1 K/UL — SIGNIFICANT CHANGE UP (ref 25–125)
RETICS/RBC NFR: 2.6 % — HIGH (ref 0.5–1.5)
SODIUM SERPL-SCNC: 143 MMOL/L — SIGNIFICANT CHANGE UP (ref 135–146)
TIBC SERPL-MCNC: 372 UG/DL — SIGNIFICANT CHANGE UP (ref 220–430)
TROPONIN T SERPL-MCNC: <0.01 NG/ML — SIGNIFICANT CHANGE UP
UIBC SERPL-MCNC: 352 UG/DL — SIGNIFICANT CHANGE UP (ref 110–370)
VIT B12 SERPL-MCNC: 335 PG/ML — SIGNIFICANT CHANGE UP (ref 232–1245)
WBC # BLD: 5.24 K/UL — SIGNIFICANT CHANGE UP (ref 4.8–10.8)
WBC # FLD AUTO: 5.24 K/UL — SIGNIFICANT CHANGE UP (ref 4.8–10.8)

## 2020-01-08 PROCEDURE — 99223 1ST HOSP IP/OBS HIGH 75: CPT | Mod: AI

## 2020-01-08 RX ORDER — FUROSEMIDE 40 MG
20 TABLET ORAL DAILY
Refills: 0 | Status: DISCONTINUED | OUTPATIENT
Start: 2020-01-08 | End: 2020-01-09

## 2020-01-08 RX ORDER — DIPHENHYDRAMINE HCL 50 MG
25 CAPSULE ORAL EVERY 6 HOURS
Refills: 0 | Status: DISCONTINUED | OUTPATIENT
Start: 2020-01-08 | End: 2020-01-10

## 2020-01-08 RX ORDER — FUROSEMIDE 40 MG
40 TABLET ORAL ONCE
Refills: 0 | Status: COMPLETED | OUTPATIENT
Start: 2020-01-08 | End: 2020-01-08

## 2020-01-08 RX ORDER — PANTOPRAZOLE SODIUM 20 MG/1
40 TABLET, DELAYED RELEASE ORAL
Refills: 0 | Status: DISCONTINUED | OUTPATIENT
Start: 2020-01-08 | End: 2020-01-08

## 2020-01-08 RX ORDER — PANTOPRAZOLE SODIUM 20 MG/1
40 TABLET, DELAYED RELEASE ORAL
Refills: 0 | Status: DISCONTINUED | OUTPATIENT
Start: 2020-01-08 | End: 2020-01-10

## 2020-01-08 RX ORDER — METOPROLOL TARTRATE 50 MG
25 TABLET ORAL DAILY
Refills: 0 | Status: DISCONTINUED | OUTPATIENT
Start: 2020-01-08 | End: 2020-01-10

## 2020-01-08 RX ADMIN — Medication 20 MILLIGRAM(S): at 06:05

## 2020-01-08 RX ADMIN — Medication 25 MILLIGRAM(S): at 06:06

## 2020-01-08 RX ADMIN — Medication 40 MILLIGRAM(S): at 19:31

## 2020-01-08 RX ADMIN — PANTOPRAZOLE SODIUM 40 MILLIGRAM(S): 20 TABLET, DELAYED RELEASE ORAL at 06:05

## 2020-01-08 RX ADMIN — Medication 25 MILLIGRAM(S): at 12:38

## 2020-01-08 RX ADMIN — PANTOPRAZOLE SODIUM 40 MILLIGRAM(S): 20 TABLET, DELAYED RELEASE ORAL at 01:57

## 2020-01-08 RX ADMIN — Medication 25 MILLIGRAM(S): at 06:05

## 2020-01-08 NOTE — CONSULT NOTE ADULT - SUBJECTIVE AND OBJECTIVE BOX
NEPHROLOGY CONSULTATION NOTE    Patient is a 74y Female whom presented to the hospital with pmhx of anemia, eczema, HTN who presented to the ED with abnormal lab results. Patient had loab work done today and had a Hgb of 6. Patient reports history of anemia in the past; has been worked up by GI in October 2019 and no source of bleeding was found.     PAST MEDICAL & SURGICAL HISTORY:  Anemia  Eczema  Heart murmur  HTN (hypertension)  H/O cholecystitis  H/O appendicitis    Allergies:  latex (Unknown)  penicillins (Unknown)    Home Medications Reviewed  Hospital Medications:   MEDICATIONS  (STANDING):  furosemide    Tablet 20 milliGRAM(s) Oral daily  metoprolol succinate ER 25 milliGRAM(s) Oral daily  pantoprazole  Injectable 40 milliGRAM(s) IV Push two times a day      SOCIAL HISTORY:  Denies ETOH,Smoking,   FAMILY HISTORY:        REVIEW OF SYSTEMS:  CONSTITUTIONAL: No weakness, fevers or chills  EYES/ENT: No visual changes;  No vertigo or throat pain   NECK: No pain or stiffness  RESPIRATORY: No cough, wheezing, hemoptysis; No shortness of breath  CARDIOVASCULAR: No chest pain or palpitations.  GASTROINTESTINAL: No abdominal or epigastric pain. No nausea, vomiting, or hematemesis; No diarrhea or constipation. No melena or hematochezia.  GENITOURINARY: No dysuria, frequency, foamy urine, urinary urgency, incontinence or hematuria  NEUROLOGICAL: No numbness or weakness  SKIN: No itching, burning, rashes, or lesions   VASCULAR: No bilateral lower extremity edema.   All other review of systems is negative unless indicated above.    VITALS:  T(F): 97.5 (01-08-20 @ 13:08), Max: 98.1 (01-08-20 @ 07:44)  HR: 79 (01-08-20 @ 13:08)  BP: 144/62 (01-08-20 @ 13:08)  RR: 18 (01-08-20 @ 13:08)  SpO2: 99% (01-08-20 @ 12:39)    Height (cm): 170.18 (01-07 @ 17:01)  Weight (kg): 106.6 (01-07 @ 17:01)  BMI (kg/m2): 36.8 (01-07 @ 17:01)  BSA (m2): 2.17 (01-07 @ 17:01)    I&O's Detail        PHYSICAL EXAM:  Constitutional: NAD  HEENT: anicteric sclera, oropharynx clear, MMM  Neck: No JVD  Respiratory: CTAB, no wheezes, rales or rhonchi  Cardiovascular: S1, S2, RRR  Gastrointestinal: BS+, soft, NT/ND  Extremities: No cyanosis or clubbing. No peripheral edema  Neurological: A/O x 3, no focal deficits  Psychiatric: Normal mood, normal affect  : No CVA tenderness. No culver.   Skin: No rashes  Vascular Access:    LABS:  01-08    143  |  108  |  25<H>  ----------------------------<  92  4.7   |  19  |  2.0<H>    Ca    8.9      08 Jan 2020 08:07    TPro  6.9  /  Alb  4.0  /  TBili  0.7  /  DBili      /  AST  12  /  ALT  <5  /  AlkPhos  82  01-08    Creatinine Trend: 2.0 <--, 1.8 <--                        6.0    5.24  )-----------( 185      ( 08 Jan 2020 08:07 )             21.6     Urine Studies:              RADIOLOGY & ADDITIONAL STUDIES:      < from: Xray Chest 1 View- PORTABLE-Urgent (01.07.20 @ 23:06) >    EXAM:  XR CHEST PORTABLE URGENT 1V            PROCEDURE DATE:  01/07/2020            INTERPRETATION:  Clinical History / Reason for exam: Chest pain    Comparison : Chest radiograph 10/19/2019.    Technique/Positioning: Frontal radiograph the chest.    Findings:    Support devices: None.    Cardiac/mediastinum/hilum: Cardiomegaly.    Lung parenchyma/Pleura: Pulmonary vascular congestion. No pneumothorax.    Skeleton/soft tissues: Stable.    Impression:      Pulmonary vascular congestion.                < end of copied text > NEPHROLOGY CONSULTATION NOTE    Patient is a 74y Female whom presented to the hospital with pmhx of anemia, eczema, HTN who presented to the ED with abnormal lab results. Patient had lab work done today and had a Hgb of 6. Patient reports history of anemia in the past; has been worked up by GI in October 2019 and no source of bleeding was found. Patient reports feeling SOB, lightheaded and tired. Denies chest pain, palpitations, melena, hematochezia, nausea, v/d/c.     PAST MEDICAL & SURGICAL HISTORY:  Anemia  Eczema  Heart murmur  HTN (hypertension)  H/O cholecystitis  H/O appendicitis    Allergies:  latex (Unknown)  penicillins (Unknown)    Home Medications Reviewed  Hospital Medications:   MEDICATIONS  (STANDING):  furosemide    Tablet 20 milliGRAM(s) Oral daily  metoprolol succinate ER 25 milliGRAM(s) Oral daily  pantoprazole  Injectable 40 milliGRAM(s) IV Push two times a day      SOCIAL HISTORY:  Denies ETOH,Smoking,     FAMILY HISTORY:  Father: CKD on dialysis; grandfather from father's side: CKD on dialysis  Maternal Grandparents Diabetes      REVIEW OF SYSTEMS:  CONSTITUTIONAL: No weakness, fevers or chills  RESPIRATORY: No cough, wheezing, hemoptysis; No shortness of breath  CARDIOVASCULAR: No chest pain or palpitations.  GASTROINTESTINAL: No abdominal or epigastric pain. No nausea, vomiting, or hematemesis; No diarrhea or constipation. No melena or hematochezia.  NEUROLOGICAL: No numbness or weakness  SKIN: No itching, burning, rashes, or lesions   VASCULAR: No bilateral lower extremity edema.   All other review of systems is negative unless indicated above.    VITALS:  T(F): 97.5 (01-08-20 @ 13:08), Max: 98.1 (01-08-20 @ 07:44)  HR: 79 (01-08-20 @ 13:08)  BP: 144/62 (01-08-20 @ 13:08)  RR: 18 (01-08-20 @ 13:08)  SpO2: 99% (01-08-20 @ 12:39)    Height (cm): 170.18 (01-07 @ 17:01)  Weight (kg): 106.6 (01-07 @ 17:01)  BMI (kg/m2): 36.8 (01-07 @ 17:01)  BSA (m2): 2.17 (01-07 @ 17:01)    I&O's Detail        PHYSICAL EXAM:  Constitutional: NAD  Respiratory: CTAB, no wheezes, rales or rhonchi  Cardiovascular: S1, S2, RRR  Gastrointestinal: BS+, soft, NT/ND  Extremities: No cyanosis or clubbing. No peripheral edema  Neurological: A/O x 3, no focal deficits  Psychiatric: Normal mood, normal affect  : No CVA tenderness. No culver.   Skin: No rashes or lesions   Vascular Access:    LABS:  01-08    143  |  108  |  25<H>  ----------------------------<  92  4.7   |  19  |  2.0<H>    Ca    8.9      08 Jan 2020 08:07    TPro  6.9  /  Alb  4.0  /  TBili  0.7  /  DBili      /  AST  12  /  ALT  <5  /  AlkPhos  82  01-08    Creatinine Trend: 2.0 <--, 1.8 <--                        6.0    5.24  )-----------( 185      ( 08 Jan 2020 08:07 )             21.6     Urine Studies:      RADIOLOGY & ADDITIONAL STUDIES:      < from: Xray Chest 1 View- PORTABLE-Urgent (01.07.20 @ 23:06) >    EXAM:  XR CHEST PORTABLE URGENT 1V            PROCEDURE DATE:  01/07/2020            INTERPRETATION:  Clinical History / Reason for exam: Chest pain    Comparison : Chest radiograph 10/19/2019.    Technique/Positioning: Frontal radiograph the chest.    Findings:    Support devices: None.    Cardiac/mediastinum/hilum: Cardiomegaly.    Lung parenchyma/Pleura: Pulmonary vascular congestion. No pneumothorax.    Skeleton/soft tissues: Stable.    Impression:      Pulmonary vascular congestion.                < end of copied text >

## 2020-01-08 NOTE — PROGRESS NOTE ADULT - SUBJECTIVE AND OBJECTIVE BOX
SUBJECTIVE:    Patient is a 74y old Female who presents with a chief complaint of Abnormal lab results (08 Jan 2020 13:45)    Currently admitted to medicine with the primary diagnosis of Severe anemia     Today is hospital day 1d. This morning she is resting comfortably in bed and reports no new issues or overnight events.     PAST MEDICAL & SURGICAL HISTORY  Anemia  Eczema  Heart murmur  HTN (hypertension)  H/O cholecystitis  H/O appendicitis    SOCIAL HISTORY:  Negative for smoking/alcohol/drug use.     ALLERGIES:  latex (Unknown)  penicillins (Unknown)    MEDICATIONS:  STANDING MEDICATIONS  furosemide    Tablet 20 milliGRAM(s) Oral daily  metoprolol succinate ER 25 milliGRAM(s) Oral daily  pantoprazole  Injectable 40 milliGRAM(s) IV Push two times a day    PRN MEDICATIONS  diphenhydrAMINE 25 milliGRAM(s) Oral every 6 hours PRN    VITALS:   T(F): 97.5  HR: 79  BP: 144/62  RR: 18  SpO2: 99%    LABS:                        6.0    5.24  )-----------( 185      ( 08 Jan 2020 08:07 )             21.6     01-08    143  |  108  |  25<H>  ----------------------------<  92  4.7   |  19  |  2.0<H>    Ca    8.9      08 Jan 2020 08:07    TPro  6.9  /  Alb  4.0  /  TBili  0.7  /  DBili  x   /  AST  12  /  ALT  <5  /  AlkPhos  82  01-08          Troponin T, Serum: <0.01 ng/mL (01-08-20 @ 08:07)  Troponin T, Serum: <0.01 ng/mL (01-07-20 @ 21:00)      CARDIAC MARKERS ( 08 Jan 2020 08:07 )  x     / <0.01 ng/mL / x     / x     / x      CARDIAC MARKERS ( 07 Jan 2020 21:00 )  x     / <0.01 ng/mL / x     / x     / x          PHYSICAL EXAM:  GEN: No acute distress, NC/AT, anicteric  LUNGS: Clear to auscultation bilaterally   HEART: S1/S2 present. RRR.   ABD: Soft, non-tender, non-distended. Bowel sounds present  EXT: NC/NC/NE/2+PP/VERGARA/Skin Intact.   NEURO: AAOX3    Intravenous access: Peripheral access ( 18 G in right AC)  NG tube: None  Arora Catheter: None

## 2020-01-08 NOTE — CONSULT NOTE ADULT - ASSESSMENT
anemia with dark stool    plan - egd tomorrow / ppi   if neg would do colonoscopy with poor prep on last colonoscopy
Patient is a 74y Female whom presented to the hospital with pmhx of anemia, eczema, HTN who presented to the ED with abnormal lab results. Patient had lab work done today and had a Hgb of 6.      CKD IV  -kidney function stable  -creatinine 2.0  -potassium & calcium within normal limits  -f/u UA  -order urine protein / creatinine ratio  -order phosphorus and PTH levels  -severity of anemia out of proportion to what is typically seen in CKD IV patients  -outpatient follow-up with nephrologist       Hypertension  -pmhx of HTN  -c/w home  metoprolol   -systolic BP typically within 130-140 as per patient   - BP today 144/62      Student Note- Attending Recommendations to Follow

## 2020-01-08 NOTE — CONSULT NOTE ADULT - ATTENDING COMMENTS
Pt seen and exmained  reports being told cr was eelvetaed for "years"  stabel since at least october 2019  Marked anemia noted, has had GI workup    Well out of proportion to be solely due to CKD   Iron def noted   may benefit from IV iron   Cause of CKD unkown   get renal US UA and urien port/cr ratio   combination of CKD + severe anemia raises concern for MM (though Ca normal)  Send SPEP UPEP serum FLC    outpt renal f/u

## 2020-01-08 NOTE — CONSULT NOTE ADULT - SUBJECTIVE AND OBJECTIVE BOX
Chief Complaint: Patient is a 74y old  Female who presents with a chief complaint of Abnormal lab results (08 Jan 2020 16:00)gi called for evaluation of symptomatic anemia and dark stool. prior w/u egd capsule colonoscopy with poor prep. denies brbpr        Review Of Systems:    Medications:  diphenhydrAMINE 25 milliGRAM(s) Oral every 6 hours PRN  furosemide    Tablet 20 milliGRAM(s) Oral daily  metoprolol succinate ER 25 milliGRAM(s) Oral daily  pantoprazole  Injectable 40 milliGRAM(s) IV Push two times a day      PMHX/PSHX:  Anemia  Eczema  Heart murmur  HTN (hypertension)  H/O cholecystitis  H/O appendicitis      Family history:      Social History:       Allergies:  latex (Unknown)  penicillins (Unknown)            PHYSICAL EXAM:   Vital Signs:  Vital Signs Last 24 Hrs  T(C): 36.3 (08 Jan 2020 16:31), Max: 36.7 (08 Jan 2020 07:44)  T(F): 97.4 (08 Jan 2020 16:31), Max: 98.1 (08 Jan 2020 07:44)  HR: 75 (08 Jan 2020 16:31) (75 - 85)  BP: 115/53 (08 Jan 2020 16:31) (115/53 - 144/62)  BP(mean): --  RR: 20 (08 Jan 2020 16:31) (18 - 20)  SpO2: 97% (08 Jan 2020 16:31) (94% - 99%)  Daily     Daily     T(C): 36.3 (01-08-20 @ 16:31), Max: 36.7 (01-08-20 @ 07:44)  HR: 75 (01-08-20 @ 16:31) (75 - 85)  BP: 115/53 (01-08-20 @ 16:31) (115/53 - 144/62)  RR: 20 (01-08-20 @ 16:31) (18 - 20)  SpO2: 97% (01-08-20 @ 16:31) (94% - 99%)    GENERAL:  Appears stated age, well-groomed, well-nourished, no distress  ABDOMEN:  Soft, non-tender, non-distended, normoactive bowel sounds,  no masses ,no hepato-splenomegaly, no signs of chronic liver disease        LABS:                        6.0    5.24  )-----------( 185      ( 08 Jan 2020 08:07 )             21.6     01-08    143  |  108  |  25<H>  ----------------------------<  92  4.7   |  19  |  2.0<H>    Ca    8.9      08 Jan 2020 08:07    TPro  6.9  /  Alb  4.0  /  TBili  0.7  /  DBili  x   /  AST  12  /  ALT  <5  /  AlkPhos  82  01-08    LIVER FUNCTIONS - ( 08 Jan 2020 08:07 )  Alb: 4.0 g/dL / Pro: 6.9 g/dL / ALK PHOS: 82 U/L / ALT: <5 U/L / AST: 12 U/L / GGT: x                   Imaging:

## 2020-01-08 NOTE — PROGRESS NOTE ADULT - ASSESSMENT
74 y F with PMH of HTN, presents with abnormal lab results.    # Severe Anemia  -Hb is 6.5, MCV 79  - Iron studies showing possible DARRIN, f/up ferritin  - She is getting 2 units of PRBC  - EGD - Esophagitis, duodenitis  - Colonoscopy - Extensive Diverticulosis, Internal hemorrhoids  - Capsule Endoscopy - No bleeding spots identified in the small bowel.  - F/up GI Consult    HTN  -On metoprolol and lasix    CKD 4  - Patient states that she has a chronic elevated creatinine and CKD, not worked up  - SEnt SPEP/IF, UPEP, f/up  - F/up nephrology consult    Eczema  -Benadryl PRN, got 1 dose before PRBC    Shortness of Breath  -Due to low Hb   -New T wave inversion on the EKG  -Troponin negative x2  - D/c tele    Diet- Clear liquid diet, advance per GI recs  DVT - Heparin q8  GI -PPI IV q12  FULL

## 2020-01-09 ENCOUNTER — TRANSCRIPTION ENCOUNTER (OUTPATIENT)
Age: 75
End: 2020-01-09

## 2020-01-09 ENCOUNTER — RESULT REVIEW (OUTPATIENT)
Age: 75
End: 2020-01-09

## 2020-01-09 LAB
% ALBUMIN: 50.1 % — SIGNIFICANT CHANGE UP
% ALPHA 1: 5.8 % — SIGNIFICANT CHANGE UP
% ALPHA 2: 10.9 % — SIGNIFICANT CHANGE UP
% BETA: 13 % — SIGNIFICANT CHANGE UP
% GAMMA: 20.2 % — SIGNIFICANT CHANGE UP
% M SPIKE: SIGNIFICANT CHANGE UP
ALBUMIN SERPL ELPH-MCNC: 3.5 G/DL — LOW (ref 3.6–5.5)
ALBUMIN/GLOB SERPL ELPH: 1 RATIO — SIGNIFICANT CHANGE UP
ALLERGY+IMMUNOLOGY DIAG STUDY NOTE: SIGNIFICANT CHANGE UP
ALPHA1 GLOB SERPL ELPH-MCNC: 0.4 G/DL — SIGNIFICANT CHANGE UP (ref 0.1–0.4)
ALPHA2 GLOB SERPL ELPH-MCNC: 0.8 G/DL — SIGNIFICANT CHANGE UP (ref 0.5–1)
ANION GAP SERPL CALC-SCNC: 15 MMOL/L — HIGH (ref 7–14)
ANTIBODY INTERPRETATION 2: SIGNIFICANT CHANGE UP
ANTIBODY INTERPRETATION 3: SIGNIFICANT CHANGE UP
B-GLOBULIN SERPL ELPH-MCNC: 0.9 G/DL — SIGNIFICANT CHANGE UP (ref 0.5–1)
BASOPHILS # BLD AUTO: 0.02 K/UL — SIGNIFICANT CHANGE UP (ref 0–0.2)
BASOPHILS NFR BLD AUTO: 0.4 % — SIGNIFICANT CHANGE UP (ref 0–1)
BUN SERPL-MCNC: 26 MG/DL — HIGH (ref 10–20)
CALCIUM SERPL-MCNC: 9.2 MG/DL — SIGNIFICANT CHANGE UP (ref 8.5–10.1)
CHLORIDE SERPL-SCNC: 107 MMOL/L — SIGNIFICANT CHANGE UP (ref 98–110)
CO2 SERPL-SCNC: 21 MMOL/L — SIGNIFICANT CHANGE UP (ref 17–32)
CREAT SERPL-MCNC: 2.2 MG/DL — HIGH (ref 0.7–1.5)
EOSINOPHIL # BLD AUTO: 0.3 K/UL — SIGNIFICANT CHANGE UP (ref 0–0.7)
EOSINOPHIL NFR BLD AUTO: 5.5 % — SIGNIFICANT CHANGE UP (ref 0–8)
GAMMA GLOBULIN: 1.4 G/DL — SIGNIFICANT CHANGE UP (ref 0.6–1.6)
GLUCOSE SERPL-MCNC: 84 MG/DL — SIGNIFICANT CHANGE UP (ref 70–99)
HCT VFR BLD CALC: 29 % — LOW (ref 37–47)
HGB BLD-MCNC: 8.7 G/DL — LOW (ref 12–16)
IMM GRANULOCYTES NFR BLD AUTO: 0.4 % — HIGH (ref 0.1–0.3)
INTERPRETATION SERPL IFE-IMP: SIGNIFICANT CHANGE UP
LYMPHOCYTES # BLD AUTO: 1.03 K/UL — LOW (ref 1.2–3.4)
LYMPHOCYTES # BLD AUTO: 18.8 % — LOW (ref 20.5–51.1)
M-SPIKE: SIGNIFICANT CHANGE UP (ref 0–0)
MCHC RBC-ENTMCNC: 24.4 PG — LOW (ref 27–31)
MCHC RBC-ENTMCNC: 30 G/DL — LOW (ref 32–37)
MCV RBC AUTO: 81.5 FL — SIGNIFICANT CHANGE UP (ref 81–99)
MONOCYTES # BLD AUTO: 0.58 K/UL — SIGNIFICANT CHANGE UP (ref 0.1–0.6)
MONOCYTES NFR BLD AUTO: 10.6 % — HIGH (ref 1.7–9.3)
NEUTROPHILS # BLD AUTO: 3.53 K/UL — SIGNIFICANT CHANGE UP (ref 1.4–6.5)
NEUTROPHILS NFR BLD AUTO: 64.3 % — SIGNIFICANT CHANGE UP (ref 42.2–75.2)
NRBC # BLD: 0 /100 WBCS — SIGNIFICANT CHANGE UP (ref 0–0)
PLATELET # BLD AUTO: 174 K/UL — SIGNIFICANT CHANGE UP (ref 130–400)
POTASSIUM SERPL-MCNC: 4.9 MMOL/L — SIGNIFICANT CHANGE UP (ref 3.5–5)
POTASSIUM SERPL-SCNC: 4.9 MMOL/L — SIGNIFICANT CHANGE UP (ref 3.5–5)
PROT PATTERN SERPL ELPH-IMP: SIGNIFICANT CHANGE UP
PROT SERPL-MCNC: 6.9 G/DL — SIGNIFICANT CHANGE UP (ref 6–8.3)
PROT SERPL-MCNC: 6.9 G/DL — SIGNIFICANT CHANGE UP (ref 6–8.3)
RBC # BLD: 3.56 M/UL — LOW (ref 4.2–5.4)
RBC # FLD: 17 % — HIGH (ref 11.5–14.5)
SODIUM SERPL-SCNC: 143 MMOL/L — SIGNIFICANT CHANGE UP (ref 135–146)
WBC # BLD: 5.48 K/UL — SIGNIFICANT CHANGE UP (ref 4.8–10.8)
WBC # FLD AUTO: 5.48 K/UL — SIGNIFICANT CHANGE UP (ref 4.8–10.8)

## 2020-01-09 PROCEDURE — 76775 US EXAM ABDO BACK WALL LIM: CPT | Mod: 26

## 2020-01-09 PROCEDURE — 91110 GI TRC IMG INTRAL ESOPH-ILE: CPT | Mod: 26

## 2020-01-09 PROCEDURE — 99232 SBSQ HOSP IP/OBS MODERATE 35: CPT

## 2020-01-09 PROCEDURE — 88305 TISSUE EXAM BY PATHOLOGIST: CPT | Mod: 26

## 2020-01-09 PROCEDURE — 88312 SPECIAL STAINS GROUP 1: CPT | Mod: 26

## 2020-01-09 PROCEDURE — 86077 PHYS BLOOD BANK SERV XMATCH: CPT

## 2020-01-09 RX ORDER — POLYETHYLENE GLYCOL 3350 17 G/17G
17 POWDER, FOR SOLUTION ORAL DAILY
Refills: 0 | Status: DISCONTINUED | OUTPATIENT
Start: 2020-01-09 | End: 2020-01-10

## 2020-01-09 RX ORDER — SODIUM CHLORIDE 9 MG/ML
1000 INJECTION INTRAMUSCULAR; INTRAVENOUS; SUBCUTANEOUS
Refills: 0 | Status: DISCONTINUED | OUTPATIENT
Start: 2020-01-09 | End: 2020-01-10

## 2020-01-09 RX ORDER — FERROUS SULFATE 325(65) MG
325 TABLET ORAL
Refills: 0 | Status: DISCONTINUED | OUTPATIENT
Start: 2020-01-09 | End: 2020-01-10

## 2020-01-09 RX ORDER — ACETAMINOPHEN 500 MG
650 TABLET ORAL ONCE
Refills: 0 | Status: COMPLETED | OUTPATIENT
Start: 2020-01-09 | End: 2020-01-09

## 2020-01-09 RX ADMIN — Medication 650 MILLIGRAM(S): at 23:03

## 2020-01-09 RX ADMIN — Medication 1 TABLET(S): at 17:12

## 2020-01-09 RX ADMIN — PANTOPRAZOLE SODIUM 40 MILLIGRAM(S): 20 TABLET, DELAYED RELEASE ORAL at 18:09

## 2020-01-09 RX ADMIN — Medication 325 MILLIGRAM(S): at 17:03

## 2020-01-09 RX ADMIN — PANTOPRAZOLE SODIUM 40 MILLIGRAM(S): 20 TABLET, DELAYED RELEASE ORAL at 05:18

## 2020-01-09 RX ADMIN — Medication 650 MILLIGRAM(S): at 22:33

## 2020-01-09 RX ADMIN — Medication 650 MILLIGRAM(S): at 19:51

## 2020-01-09 RX ADMIN — Medication 25 MILLIGRAM(S): at 05:18

## 2020-01-09 NOTE — PROGRESS NOTE ADULT - ASSESSMENT
74 y F with PMH of HTN, presents with abnormal lab results.    # Severe Microcytic Anemia w/ symptoms (SOB)  -Hb is 6.5, MCV 79  Vitamin B 12 and folate are nl  iron studies c/w DARRIN (? cause)  FeSO4 q24 + MVI q24  EGD noted: nl esoph; mild erosive antral gastritis; mild duodenitis  Cap Endo - in progress  C-Scope: rpt as outpt  GI Consult  cbc in am    # weak gamma-migrating paraprotein  no major evid for MM  Hem-Onc Consult can be outpt    # HTN  On metoprolol, lasix    # CKD 4  Can be part of cause of anemia  should see renal as outpt; consider Epo  Stable    # CXR w/ vasc congestion  pt is asymp  prob from CKD, body habitus and poss JOSETTE (? pulm HTN)  can be followed up w/ PMD -> perhaps pulm eval for PSNG    # Eczema  -Benadryl PRN    # Occ vaginal spotting by hx  told pt to see GYN w/in 1-2 months (sooner if bleeding more severe)    # DVT - SCDs and ambulate; avoid heparins    # GI -PPI IV for now    # FULL CODE    Dispo: replace iron; f/u results cap endo; f/u GI for plan; outpt renal eval; outpt GYN eval; anticipate d/c home tomorrow 74 y F with PMH of HTN, presents with abnormal lab results.    # Severe Microcytic Anemia w/ symptoms (SOB)  -Hb is 6.5, MCV 79  Vitamin B 12 and folate are nl  iron studies c/w DARRIN (? cause)  FeSO4 q24 + MVI q24  EGD noted: nl esoph; mild erosive antral gastritis; mild duodenitis  Cap Endo - in progress  C-Scope: rpt as outpt  GI Consult  cbc in am    # weak gamma-migrating paraprotein  no major evid for MM  Hem-Onc Consult can be outpt    # HTN  On metoprolol, lasix    # CKD 4  Can be part of cause of anemia  should see renal as outpt; consider Epo  Stable    # CXR w/ vasc congestion  pt is asymp  prob from CKD, body habitus and poss JOSETTE (? pulm HTN)  can be followed up w/ PMD -> perhaps pulm eval for PSNG    # Eczema  -Benadryl PRN    # Occ vaginal spotting by hx  told pt to see GYN w/in 1-2 months (sooner if bleeding more severe)    # BMI 36.8 = obesity  wt loss advised    # DVT - SCDs and ambulate; avoid heparins    # GI -PPI IV for now    # FULL CODE    Dispo: replace iron; f/u results cap endo; f/u GI for plan; outpt renal eval; outpt GYN eval; anticipate d/c home tomorrow

## 2020-01-09 NOTE — PROGRESS NOTE ADULT - ASSESSMENT
74 y F with PMH of possible Coronary Artery Disease, aortic stenosis, HTN, presents with shortness of breath and an outpatient lab finding of Hb 6.0.    # Severe Iron Deficiency Anemia  -Hb on admission 6.5, MCV 79, hemodynamically stable   -New T wave inversion on the EKG, Troponin negative x2, no chest pain  - Iron studies consistent with DARRIN  -Denies taking any NSAID's, ASA, no evidence of gross bleed  - S/p 2 units pRBC 1/8   - S/p EGD, capsule, and colo 10/2019 w/o finding bleeding source  -EGD today 1/9 with erosive, erythema, congestion in antrum, +duodenitis, no active bleed.  --Capsule endoscopy underway   --Serial CBC, transfuse <7    #CKD 4  -Patient states that she has a chronic elevated creatinine and CKD, not worked up  -Nephro consulted, ?MM with anemia and kidney disease. However, no bone pain, calcium normal, globulin fraction low and anion gap high, less likely to be MM  -Renal US within normal limits   -Bicarb low on admission, now normalized, discussed with renal, no bicarb for now  --Sent SPEP, UPEP, IF    #Likely hypothyroidism  --Sending TSH, free T3/T4    #Possible Coronary Artery Disease  -Chart lists Coronary Artery Disease, patient states she doesn't have Coronary Artery Disease, however patient is not sure about much of her medical history  --Outpt cardiology eval--she sees Dr Munir Paula    #Aortic stenosis  -Caution with fluids  --F/u outpatient Dr Paula    #HTN   -Continue home metoprolol    #Eczema  -Benadryl PRN    #Arthritis  -Doesn't know if this is rheumatoid arthritis or if this is osteoarthritis and 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 Not on any rheum meds, takes Tylenol at home.   --Tylenol PRN     #Gout  -Was on colchicine but is not now  -No evidence of acute flare  --Follow up outpatient     Diet-NPO for capsule endo, advance per GI recs  DVT - Heparin   GI -PPI IV q12  Activity- as tolerated  Disposition: Back to home, ambulatory  CODE FULL 74 y F with PMH of possible Coronary Artery Disease, aortic stenosis, HTN, presents with shortness of breath and an outpatient lab finding of Hb 6.0.    # Severe Iron Deficiency Anemia  -Hb on admission 6.5, MCV 79, hemodynamically stable   -New T wave inversion on the EKG, Troponin negative x2, no chest pain  - Iron studies consistent with ADRRIN  -Denies taking any NSAID's, ASA, no evidence of gross bleed  - S/p 2 units pRBC 1/8   - S/p EGD, capsule, and colo 10/2019 w/o finding bleeding source  -EGD today 1/9 with erosive, erythema, congestion in antrum, +duodenitis, no active bleed.  --Capsule endoscopy underway   --Serial CBC, transfuse <7    #CKD 4  -Patient states that she has a chronic elevated creatinine and CKD, not worked up  -Nephro consulted, ?MM with anemia and kidney disease. However, no bone pain, calcium normal, globulin fraction low and anion gap high, less likely to be MM  -Renal US within normal limits   -Bicarb low on admission, now normalized, discussed with renal, no bicarb for now  --Sent SPEP, UPEP, IF    #Likely hypothyroidism  --Sending TSH, free T3/T4    #Possible Coronary Artery Disease  -Chart lists Coronary Artery Disease, patient states she doesn't have Coronary Artery Disease, however patient is not sure about much of her medical history  --Outpt cardiology eval--she sees Dr Munir Paula    #Aortic stenosis  -Caution with fluids  --F/u outpatient Dr Paula    #HTN   -Continue home metoprolol    #Eczema  -Benadryl PRN    #Arthritis  -Doesn't know if this is rheumatoid arthritis or if this is osteoarthritis and 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 Not on any rheum meds, takes Tylenol at home.   --Tylenol PRN     #Gout  -Was on colchicine but is not now  -No evidence of acute flare  --Follow up outpatient     Diet-NPO for capsule endo, advance per GI recs  DVT - SCDs  GI -PPI IV q12  Activity- as tolerated  Disposition: Back to home, ambulatory  CODE FULL

## 2020-01-09 NOTE — PROGRESS NOTE ADULT - SUBJECTIVE AND OBJECTIVE BOX
ARREAGALATRICIA BARRON  74y  Female  ***My note supersedes ALL resident notes that I sign.  My corrections for their notes are in my note.***    I can be reached directly on [facility]. My office number is [phone]. My personal cell number is [phone].    INTERVAL EVENTS: Here for f/u of anemia. Cause was not found have EGD, cap endo and c-scope in Oct. s/p EGD now and getting cap endo now. Pt is feeling better after transfusion. No complaints. No NSAID use. No gastric or duod surgery in past. Pt eats a diet w/ iron. No evid for malabsorption (just occ, rare loose BM).  No obvious bleeding. Pt reports occ, minute, post-sally vaginal spotting of dark blood - not at all close enough to explain anemia.    T(F): 97 (01-09-20 @ 14:21), Max: 98.1 (01-09-20 @ 10:07)  HR: 72 (01-09-20 @ 14:21) (66 - 77)  BP: 142/61 (01-09-20 @ 14:21) (91/55 - 142/61)  RR: 18 (01-09-20 @ 14:21) (16 - 18)  SpO2: 99% (01-09-20 @ 11:02) (99% - 99%)    Gen: NAD  HEENT: WNL  Neck: no JVD; no nodes; thyroid nl  lungs: clr  hrt: s1 s2 rrr  abd: soft, obese, NT/ND, body size precludes ability to discern any masses  ext: no edema, no c/c  neuro: WNL    LABS:                        8.7     (    81.5   5.48  )-----------( ---------      174      ( 09 Jan 2020 05:13 )             29.0    (    17.0     143   (   107   (   84      01-09-20 @ 05:13  ----------------------               4.9   (   21   (   26                             -----                        2.2  Ca  9.2   Mg  --    P   --     CARDIAC MARKERS ( 08 Jan 2020 08:07 )  x     / <0.01 ng/mL / x     / x     / x      CARDIAC MARKERS ( 07 Jan 2020 21:00 )  x     / <0.01 ng/mL / x     / x     / x        RADIOLOGY & ADDITIONAL TESTS:  < from: US Renal (01.09.20 @ 08:15) >  IMPRESSION:    Normal renal ultrasound.    < end of copied text >    < from: Xray Chest 1 View- PORTABLE-Urgent (01.07.20 @ 23:06) >  Impression:      Pulmonary vascular congestion.    < end of copied text >    MEDICATIONS:    bisacodyl Suppository 10 milliGRAM(s) Rectal daily PRN  diphenhydrAMINE 25 milliGRAM(s) Oral every 6 hours PRN  ferrous    sulfate 325 milliGRAM(s) Oral two times a day  metoprolol succinate ER 25 milliGRAM(s) Oral daily  multivitamin 1 Tablet(s) Oral daily  pantoprazole  Injectable 40 milliGRAM(s) IV Push two times a day  polyethylene glycol 3350 17 Gram(s) Oral daily PRN  sodium chloride 0.9%. 1000 milliLiter(s) IV Continuous <Continuous>

## 2020-01-09 NOTE — PROGRESS NOTE ADULT - SUBJECTIVE AND OBJECTIVE BOX
LATRICIA ARREAGA 74y Female  MRN#: [id number]     SUBJECTIVE  Patient is a 74y old Female who presents with a chief complaint of Abnormal lab results (08 Jan 2020 19:48)      Today is hospital day 2d, and this morning she is lying in bed without distress. Reports that her shortness of breath is greatly improved after transfusion. No chest pain, palpitations, lightheadedness. Does endorse hair loss and very dry skin, never been tested for thyroid disease. Denies bone pain.  No acute overnight events.     OBJECTIVE  PAST MEDICAL & SURGICAL HISTORY  Anemia  Eczema  Heart murmur  HTN (hypertension)  H/O cholecystitis  H/O appendicitis    ALLERGIES:  latex (Unknown)  penicillins (Unknown)    MEDICATIONS:  STANDING MEDICATIONS  ferrous    sulfate 325 milliGRAM(s) Oral two times a day  metoprolol succinate ER 25 milliGRAM(s) Oral daily  multivitamin 1 Tablet(s) Oral daily  pantoprazole  Injectable 40 milliGRAM(s) IV Push two times a day  sodium chloride 0.9%. 1000 milliLiter(s) IV Continuous <Continuous>    PRN MEDICATIONS  diphenhydrAMINE 25 milliGRAM(s) Oral every 6 hours PRN    HOME MEDICATIONS  Home Medications:  Benadryl 12.5 mg oral tablet, chewable: 2 tab(s) orally every 6 hours, As Needed (21 Nov 2019 07:46)  furosemide 20 mg oral tablet: 1 tab(s) orally once a day (21 Nov 2019 07:46)  metoprolol succinate 25 mg oral tablet, extended release: 1 tab(s) orally once a day (21 Nov 2019 07:46)      LABS:                        8.7    5.48  )-----------( 174      ( 09 Jan 2020 05:13 )             29.0     01-09    143  |  107  |  26<H>  ----------------------------<  84  4.9   |  21  |  2.2<H>    Ca    9.2      09 Jan 2020 05:13    TPro  6.9  /  Alb  3.5<L>  /  TBili  x   /  DBili  x   /  AST  x   /  ALT  x   /  AlkPhos  x   01-08    LIVER FUNCTIONS - ( 08 Jan 2020 11:09 )  Alb: 3.5 g/dL / Pro: 6.9 g/dL / ALK PHOS: x     / ALT: x     / AST: x     / GGT: x                     CARDIAC MARKERS ( 08 Jan 2020 08:07 )  x     / <0.01 ng/mL / x     / x     / x      CARDIAC MARKERS ( 07 Jan 2020 21:00 )  x     / <0.01 ng/mL / x     / x     / x          CAPILLARY BLOOD GLUCOSE          PHYSICAL EXAM:  T(C): 36.1 (01-09-20 @ 14:21), Max: 36.7 (01-09-20 @ 10:07)  HR: 72 (01-09-20 @ 14:21) (66 - 77)  BP: 142/61 (01-09-20 @ 14:21) (91/55 - 142/61)  RR: 18 (01-09-20 @ 14:21) (16 - 20)  SpO2: 99% (01-09-20 @ 11:02) (97% - 99%)    GENERAL: NAD, well-developed, 74y sitting in bed, awake  EENT: EOMI, conjunctiva and sclera clear, No nasal obstruction or discharge  RESPIRATORY: Clear to auscultation bilaterally; No wheeze or crackles  CARDIOVASCULAR: Regular rate and rhythm; No murmurs, rubs, or gallops, no pitting edema  GASTROINTESTINAL: Abdomen Soft, Nontender, Nondistended  MUSCULOSKELETAL:  No cyanosis, extremities grossly symmetrical  PSYCH: AAOx3, affect appropriate  NEUROLOGY: non-focal, cognition grossly intact, MAEE    ADMISSION SUMMARY  Patient is a 74y old Female who presents with a chief complaint of Abnormal lab results (08 Jan 2020 19:48) LATRICIA ARREAGA 74y Female  MRN#: [id number]     SUBJECTIVE  Patient is a 74y old Female who presents with a chief complaint of Abnormal lab results (08 Jan 2020 19:48)      Today is hospital day 2d, and this morning she is lying in bed without distress. Reports that her shortness of breath is greatly improved after transfusion. No chest pain, palpitations, lightheadedness. Does endorse hair loss and very dry skin, never been tested for thyroid disease. Denies bone pain. , son at bedside in afternoon, discussed condition and plan of care.  No acute overnight events.     OBJECTIVE  PAST MEDICAL & SURGICAL HISTORY  Anemia  Eczema  Heart murmur  HTN (hypertension)  H/O cholecystitis  H/O appendicitis    ALLERGIES:  latex (Unknown)  penicillins (Unknown)    MEDICATIONS:  STANDING MEDICATIONS  ferrous    sulfate 325 milliGRAM(s) Oral two times a day  metoprolol succinate ER 25 milliGRAM(s) Oral daily  multivitamin 1 Tablet(s) Oral daily  pantoprazole  Injectable 40 milliGRAM(s) IV Push two times a day  sodium chloride 0.9%. 1000 milliLiter(s) IV Continuous <Continuous>    PRN MEDICATIONS  diphenhydrAMINE 25 milliGRAM(s) Oral every 6 hours PRN    HOME MEDICATIONS  Home Medications:  Benadryl 12.5 mg oral tablet, chewable: 2 tab(s) orally every 6 hours, As Needed (21 Nov 2019 07:46)  furosemide 20 mg oral tablet: 1 tab(s) orally once a day (21 Nov 2019 07:46)  metoprolol succinate 25 mg oral tablet, extended release: 1 tab(s) orally once a day (21 Nov 2019 07:46)      LABS:                        8.7    5.48  )-----------( 174      ( 09 Jan 2020 05:13 )             29.0     01-09    143  |  107  |  26<H>  ----------------------------<  84  4.9   |  21  |  2.2<H>    Ca    9.2      09 Jan 2020 05:13    TPro  6.9  /  Alb  3.5<L>  /  TBili  x   /  DBili  x   /  AST  x   /  ALT  x   /  AlkPhos  x   01-08    LIVER FUNCTIONS - ( 08 Jan 2020 11:09 )  Alb: 3.5 g/dL / Pro: 6.9 g/dL / ALK PHOS: x     / ALT: x     / AST: x     / GGT: x                     CARDIAC MARKERS ( 08 Jan 2020 08:07 )  x     / <0.01 ng/mL / x     / x     / x      CARDIAC MARKERS ( 07 Jan 2020 21:00 )  x     / <0.01 ng/mL / x     / x     / x          CAPILLARY BLOOD GLUCOSE          PHYSICAL EXAM:  T(C): 36.1 (01-09-20 @ 14:21), Max: 36.7 (01-09-20 @ 10:07)  HR: 72 (01-09-20 @ 14:21) (66 - 77)  BP: 142/61 (01-09-20 @ 14:21) (91/55 - 142/61)  RR: 18 (01-09-20 @ 14:21) (16 - 20)  SpO2: 99% (01-09-20 @ 11:02) (97% - 99%)    GENERAL: NAD, well-developed, 74y sitting in bed, awake  EENT: EOMI, conjunctiva and sclera clear, No nasal obstruction or discharge  RESPIRATORY: Clear to auscultation bilaterally; No wheeze or crackles  CARDIOVASCULAR: Regular rate and rhythm; No rubs, or gallops, no pitting edema. +Harsh systolic murmur best heard at R 2nd intercostal space  GASTROINTESTINAL: Abdomen Soft, Nontender, Nondistended  MUSCULOSKELETAL:  No cyanosis, extremities grossly symmetrical, +mild ttp on R clavicle, shoulder, and several vertebra  PSYCH: AAOx3, affect appropriate  NEUROLOGY: non-focal, cognition grossly intact, MAEE  ENDOCRINE:  +pronounced central alopecia, diffuse xerosis    ADMISSION SUMMARY  Patient is a 74y old Female who presents with a chief complaint of Abnormal lab results (08 Jan 2020 19:48)

## 2020-01-10 ENCOUNTER — TRANSCRIPTION ENCOUNTER (OUTPATIENT)
Age: 75
End: 2020-01-10

## 2020-01-10 VITALS
TEMPERATURE: 98 F | RESPIRATION RATE: 18 BRPM | DIASTOLIC BLOOD PRESSURE: 60 MMHG | SYSTOLIC BLOOD PRESSURE: 134 MMHG | HEART RATE: 77 BPM

## 2020-01-10 LAB
ALLERGY+IMMUNOLOGY DIAG STUDY NOTE: SIGNIFICANT CHANGE UP
ANION GAP SERPL CALC-SCNC: 11 MMOL/L — SIGNIFICANT CHANGE UP (ref 7–14)
ANTIBODY INTERPRETATION 2: SIGNIFICANT CHANGE UP
BLD GP AB SCN SERPL QL: SIGNIFICANT CHANGE UP
BUN SERPL-MCNC: 29 MG/DL — HIGH (ref 10–20)
CALCIUM SERPL-MCNC: 8.7 MG/DL — SIGNIFICANT CHANGE UP (ref 8.5–10.1)
CHLORIDE SERPL-SCNC: 108 MMOL/L — SIGNIFICANT CHANGE UP (ref 98–110)
CO2 SERPL-SCNC: 21 MMOL/L — SIGNIFICANT CHANGE UP (ref 17–32)
CREAT SERPL-MCNC: 2.4 MG/DL — HIGH (ref 0.7–1.5)
DIR ANTIGLOB POLYSPECIFIC INTERPRETATION: SIGNIFICANT CHANGE UP
GLUCOSE SERPL-MCNC: 88 MG/DL — SIGNIFICANT CHANGE UP (ref 70–99)
HCT VFR BLD CALC: 29.4 % — LOW (ref 37–47)
HGB BLD-MCNC: 8.9 G/DL — LOW (ref 12–16)
MAGNESIUM SERPL-MCNC: 1.8 MG/DL — SIGNIFICANT CHANGE UP (ref 1.8–2.4)
MCHC RBC-ENTMCNC: 24.6 PG — LOW (ref 27–31)
MCHC RBC-ENTMCNC: 30.3 G/DL — LOW (ref 32–37)
MCV RBC AUTO: 81.2 FL — SIGNIFICANT CHANGE UP (ref 81–99)
NRBC # BLD: 0 /100 WBCS — SIGNIFICANT CHANGE UP (ref 0–0)
PLATELET # BLD AUTO: 180 K/UL — SIGNIFICANT CHANGE UP (ref 130–400)
POTASSIUM SERPL-MCNC: 4.5 MMOL/L — SIGNIFICANT CHANGE UP (ref 3.5–5)
POTASSIUM SERPL-SCNC: 4.5 MMOL/L — SIGNIFICANT CHANGE UP (ref 3.5–5)
RBC # BLD: 3.62 M/UL — LOW (ref 4.2–5.4)
RBC # FLD: 17.2 % — HIGH (ref 11.5–14.5)
SODIUM SERPL-SCNC: 140 MMOL/L — SIGNIFICANT CHANGE UP (ref 135–146)
SURGICAL PATHOLOGY STUDY: SIGNIFICANT CHANGE UP
T3FREE SERPL-MCNC: 2.63 PG/ML — SIGNIFICANT CHANGE UP (ref 1.8–4.6)
T4 FREE SERPL-MCNC: 1.2 NG/DL — SIGNIFICANT CHANGE UP (ref 0.9–1.8)
TROPONIN T SERPL-MCNC: <0.01 NG/ML — SIGNIFICANT CHANGE UP
TSH SERPL-MCNC: 4.17 UIU/ML — SIGNIFICANT CHANGE UP (ref 0.27–4.2)
WBC # BLD: 6.64 K/UL — SIGNIFICANT CHANGE UP (ref 4.8–10.8)
WBC # FLD AUTO: 6.64 K/UL — SIGNIFICANT CHANGE UP (ref 4.8–10.8)

## 2020-01-10 PROCEDURE — 71045 X-RAY EXAM CHEST 1 VIEW: CPT | Mod: 26

## 2020-01-10 PROCEDURE — 99239 HOSP IP/OBS DSCHRG MGMT >30: CPT

## 2020-01-10 PROCEDURE — 93010 ELECTROCARDIOGRAM REPORT: CPT

## 2020-01-10 RX ORDER — FERROUS SULFATE 325(65) MG
1 TABLET ORAL
Qty: 30 | Refills: 2
Start: 2020-01-10 | End: 2020-04-08

## 2020-01-10 RX ORDER — NYSTATIN CREAM 100000 [USP'U]/G
1 CREAM TOPICAL
Refills: 0 | Status: DISCONTINUED | OUTPATIENT
Start: 2020-01-10 | End: 2020-01-10

## 2020-01-10 RX ORDER — PANTOPRAZOLE SODIUM 20 MG/1
1 TABLET, DELAYED RELEASE ORAL
Qty: 30 | Refills: 0
Start: 2020-01-10 | End: 2020-02-08

## 2020-01-10 RX ORDER — FAMOTIDINE 10 MG/ML
20 INJECTION INTRAVENOUS ONCE
Refills: 0 | Status: COMPLETED | OUTPATIENT
Start: 2020-01-10 | End: 2020-01-10

## 2020-01-10 RX ADMIN — Medication 25 MILLIGRAM(S): at 03:18

## 2020-01-10 RX ADMIN — Medication 25 MILLIGRAM(S): at 05:55

## 2020-01-10 RX ADMIN — PANTOPRAZOLE SODIUM 40 MILLIGRAM(S): 20 TABLET, DELAYED RELEASE ORAL at 05:56

## 2020-01-10 RX ADMIN — Medication 325 MILLIGRAM(S): at 05:55

## 2020-01-10 RX ADMIN — FAMOTIDINE 20 MILLIGRAM(S): 10 INJECTION INTRAVENOUS at 03:49

## 2020-01-10 NOTE — PROGRESS NOTE ADULT - REASON FOR ADMISSION
Abnormal lab results

## 2020-01-10 NOTE — DISCHARGE NOTE PROVIDER - NSDCCPCAREPLAN_GEN_ALL_CORE_FT
PRINCIPAL DISCHARGE DIAGNOSIS  Diagnosis: Severe anemia  Assessment and Plan of Treatment: You came in with severe iron-deficiency anemia. We gave you 2 units of blood and your shortness of breath improved. We did an EGD and capsule endoscopy, which did not find the source of the bleed.  -Please take your iron supplements as prescribed  -Please Follow up with GI and hematology for continued investigation of your anemia      SECONDARY DISCHARGE DIAGNOSES  Diagnosis: CKD (chronic kidney disease), stage IV  Assessment and Plan of Treatment: You have chronic kidney disease and the cause for it is not clear.   -Please Follow up with nephrology    Diagnosis: Severe aortic stenosis  Assessment and Plan of Treatment: You have severe aortic stenosis, which may be contributing to your shortness of breath. You also had borderline pulmonary hypertension in October, which may be progressing.  -Please Follow up with cardiology and pulmonology and please have a sleep study done    Diagnosis: Alopecia  Assessment and Plan of Treatment: You have hair loss and dry skin, which may be due to hypothyroidism. We sent the bloodwork to check for this but it has not resulted yet.  -Please Follow up with your primary care provider for this result    Diagnosis: Vaginal spotting  Assessment and Plan of Treatment: You reported intermittent mild vaginal bleeding.  -Please Follow up with gynecology PRINCIPAL DISCHARGE DIAGNOSIS  Diagnosis: Severe anemia  Assessment and Plan of Treatment: You came in with severe iron-deficiency anemia. We gave you 2 units of blood and your shortness of breath improved. We did an EGD and capsule endoscopy, which did not find the source of the bleed.  -Please take your iron supplements as prescribed  -Please Follow up with GI and hematology for continued investigation of your anemia.      SECONDARY DISCHARGE DIAGNOSES  Diagnosis: CKD (chronic kidney disease), stage IV  Assessment and Plan of Treatment: You have chronic kidney disease and the cause for it is not clear.   -Please Follow up with nephrology    Diagnosis: Severe aortic stenosis  Assessment and Plan of Treatment: You have severe aortic stenosis, which may be contributing to your shortness of breath. You also had borderline pulmonary hypertension in October, which may be progressing.  -Please Follow up with cardiology and pulmonology and please have a sleep study done    Diagnosis: Alopecia  Assessment and Plan of Treatment: You have hair loss and dry skin, which may be due to hypothyroidism. We sent the bloodwork to check for this but it has not resulted yet.  -Please Follow up with your primary care provider for this result    Diagnosis: Vaginal spotting  Assessment and Plan of Treatment: You reported intermittent mild vaginal bleeding.  -Please Follow up with gynecology PRINCIPAL DISCHARGE DIAGNOSIS  Diagnosis: Severe anemia  Assessment and Plan of Treatment: You came in with severe iron-deficiency anemia. We gave you 2 units of blood and your shortness of breath improved. We did an EGD and capsule endoscopy, which did not find the source of the bleed.  -Please take your iron supplements as prescribed  -Please Follow up with GI and hematology for continued investigation of your anemia. Please ask GI for the results of your capsule endoscopy.      SECONDARY DISCHARGE DIAGNOSES  Diagnosis: CKD (chronic kidney disease), stage IV  Assessment and Plan of Treatment: You have chronic kidney disease and the cause for it is not clear.   -Please Follow up with nephrology    Diagnosis: Severe aortic stenosis  Assessment and Plan of Treatment: You have severe aortic stenosis, which may be contributing to your shortness of breath. You also had borderline pulmonary hypertension in October, which may be progressing.  -Please Follow up with cardiology and pulmonology and please have a sleep study done    Diagnosis: Alopecia  Assessment and Plan of Treatment: You have hair loss and dry skin, which may be due to hypothyroidism. We sent the bloodwork to check for this but it has not resulted yet.  -Please Follow up with your primary care provider for this result    Diagnosis: Vaginal spotting  Assessment and Plan of Treatment: You reported intermittent mild vaginal bleeding.  -Please Follow up with gynecology

## 2020-01-10 NOTE — DISCHARGE NOTE PROVIDER - CARE PROVIDERS DIRECT ADDRESSES
[email or web address],DirectAddress_Unknown,[email or web address],DirectAddress_Unknown,[email or web address],DirectAddress_Unknown

## 2020-01-10 NOTE — DISCHARGE NOTE PROVIDER - NSDCFUADDAPPT_GEN_ALL_CORE_FT
To see a pulmonologist or gynecologist at [facility], please contact:  [place] [address]  [phone]

## 2020-01-10 NOTE — DISCHARGE NOTE PROVIDER - NSFOLLOWUPCLINICS_GEN_ALL_ED_FT
A Pulmonologist  Pulmonary Medicine  .  [place]   Phone:   Fax:   Follow Up Time: 1 week    An OB/GYN physician  Obstetrics & Gynecology  .  [place]   Phone:   Fax:   Follow Up Time: 1 week A Pulmonologist  Pulmonary Medicine  .  [place]   Phone:   Fax:   Follow Up Time: 1 week    An OB/GYN physician  Obstetrics & Gynecology  .  [place]   Phone:   Fax:   Follow Up Time: 1 month

## 2020-01-10 NOTE — DISCHARGE NOTE NURSING/CASE MANAGEMENT/SOCIAL WORK - PATIENT PORTAL LINK FT
You can access the FollowMyHealth Patient Portal offered by [facility] by registering at the following website: [email or web address]. By joining [employer]’s FollowMyHealth portal, you will also be able to view your health information using other applications (apps) compatible with our system.

## 2020-01-10 NOTE — DISCHARGE NOTE PROVIDER - CARE PROVIDER_API CALL
Gildardo Bill)  [address]  [address] [place] [address]  Phone: [phone]  Fax: [phone]  Follow Up Time: 1 week    Peng Camejo)  Internal Medicine  [address] [place] [address]  Phone: [phone]  Fax: [phone]  Follow Up Time: 1 week    Prudencio Conroy (DO)  Gastroenterology  [address] [place] [address]  Phone: [phone]  Fax: [phone]  Follow Up Time: 1 week    Munir Paula)  Cardiovascular Disease; Internal Medicine; Interventional Cardiology  [address] [place] [address]  Phone: [phone]  Fax: [phone]  Follow Up Time: 1 week Gildardo Bill)  [id number]  [address] [place] [address]  Phone: [phone]  Fax: [phone]  Follow Up Time: 1 week    Peng Camejo)  Internal Medicine  [address] [place] [address]  Phone: [phone]  Fax: [phone]  Follow Up Time: 1 week    Munir Paula)  Cardiovascular Disease; Internal Medicine; Interventional Cardiology  [address] [place] [address]  Phone: [phone]  Fax: [phone]  Follow Up Time: 1 week    Prudencio Robles ()  Gastroenterology  [address] [id number]  Phone: [phone]  Fax: [phone]  Follow Up Time: 1 week

## 2020-01-10 NOTE — DISCHARGE NOTE NURSING/CASE MANAGEMENT/SOCIAL WORK - NSDCFUADDAPPT_GEN_ALL_CORE_FT
To see a pulmonologist or gynecologist at [facility], please contact:  [place] [address]  [phone]

## 2020-01-10 NOTE — DISCHARGE NOTE PROVIDER - PROVIDER TOKENS
PROVIDER:[TOKEN:[[id number]],FOLLOWUP:[1 week]],PROVIDER:[TOKEN:[[id number]],FOLLOWUP:[1 week]],PROVIDER:[TOKEN:[[id number]],FOLLOWUP:[1 week]],PROVIDER:[TOKEN:[[id number]],FOLLOWUP:[1 week]] PROVIDER:[TOKEN:[[id number]],FOLLOWUP:[1 week]],PROVIDER:[TOKEN:[[id number]],FOLLOWUP:[1 week]],PROVIDER:[TOKEN:[[id number]],FOLLOWUP:[1 week]],PROVIDER:[TOKEN:[[id number]],FOLLOWUP:[1 week]]

## 2020-01-10 NOTE — PROGRESS NOTE ADULT - ASSESSMENT
74y Female whom presented to the hospital with pmhx of anemia, eczema, HTN who presented to the ED with abnormal lab results. Patient had lab work done today and had a Hgb of 6.      CKD IV  - Renal function relativly stable eGFR ~20    -f/u UA . urine protein / creatinine ratio  -renal US unremarkable   - check  phosphorus PTH levels vit D  -severity of anemia out of proportion to what is typically seen in CKD IV patients, GI workup underway s/p  EGD and capsule endocopy     - cont Iron  - SPEP UPEP negative       Hypertension  - controlled      d/c plan noted   -outpatient renal f/u

## 2020-01-10 NOTE — DISCHARGE NOTE PROVIDER - HOSPITAL COURSE
74 y F with PMH of possible Coronary Artery Disease, aortic stenosis, HTN, presents with shortness of breath and an outpatient lab finding of Hb 6.0.    In Emergency Department, hemodynamically stable with Hb 6.5, New T wave inversion on the EKG, Troponin negative x2, no chest pain. Given 2u pRBC with relief of shortness of breath. Patient was S/p EGD, capsule, and colo 10/2019 w/o finding bleeding source. EGD repeated on this admission, did not show bleeding source, did show erosion, erythema, congestion in antrum, +duodenitis. Patient underwent capsule endoscopy, which     Patient noted to have severely reduced kidney function, renal US within normal limits, initial MM workup negative, unlikely MM.     Patient also noted to have symptoms consistent with hypothyroidism, TSH, Free T3/T4 sent, still pending.    Patient is hemodynamically stable and ready for discharge. 74 y F with PMH of possible Coronary Artery Disease, aortic stenosis, HTN, presents with shortness of breath and an outpatient lab finding of Hb 6.0.    In Emergency Department, hemodynamically stable with Hb 6.5, New T wave inversion on the EKG, Troponin negative x2, no chest pain. Given 2u pRBC with relief of shortness of breath. Patient was S/p EGD, capsule, and colo 10/2019 w/o finding bleeding source. EGD repeated on this admission, did not show bleeding source, did show erosion, erythema, congestion in antrum, +duodenitis. Patient underwent capsule endoscopy, which     Patient noted to have severely reduced kidney function, renal US within normal limits, initial MM workup negative, unlikely MM.     Patient also noted to have symptoms consistent with hypothyroidism, TSH, Free T3/T4 sent, still pending.    Patient is hemodynamically stable and ready for discharge.         Day of Discharge Physical Exam:        T(C): 36.4 (01-10-20 @ 04:35), Max: 37.1 (01-09-20 @ 20:00)    HR: 79 (01-10-20 @ 04:35) (68 - 83)    BP: 112/53 (01-10-20 @ 04:35) (110/54 - 142/61)    RR: 18 (01-10-20 @ 04:35) (16 - 19)    SpO2: 96% (01-10-20 @ 03:20) (96% - 96%)        GENERAL: NAD, well-developed, 74y sitting in bed, awake    EENT: EOMI, conjunctiva and sclera clear, No nasal obstruction or discharge    RESPIRATORY: Clear to auscultation bilaterally; No wheeze or crackles    CARDIOVASCULAR: Regular rate and rhythm; No rubs, or gallops, no pitting edema. +Harsh systolic murmur best heard at R 2nd intercostal space    GASTROINTESTINAL: Abdomen Soft, Nontender, Nondistended    MUSCULOSKELETAL:  No cyanosis, extremities grossly symmetrical    PSYCH: AAOx3, affect appropriate    NEUROLOGY: non-focal, cognition grossly intact, MAEE    ENDOCRINE:  +pronounced central alopecia, diffuse xerosis 74 y F with PMH of possible Coronary Artery Disease, aortic stenosis, HTN, presents with shortness of breath and an outpatient lab finding of Hb 6.0.    In Emergency Department, hemodynamically stable with Hb 6.5, New T wave inversion on the EKG, Troponin negative x2, no chest pain. Given 2u pRBC with relief of shortness of breath. Patient was S/p EGD, capsule, and colo 10/2019 w/o finding bleeding source. EGD repeated on this admission, did not show bleeding source, did show erosion, erythema, congestion in antrum, +duodenitis. Patient underwent capsule endoscopy, patient to Follow up outpatient for results.      Patient noted to have severely reduced kidney function, renal US within normal limits, initial MM workup negative, unlikely MM.     Patient also noted to have symptoms consistent with hypothyroidism, TSH, Free T3/T4 sent, still pending.    Patient is hemodynamically stable and ready for discharge.         Day of Discharge Physical Exam:        T(C): 36.4 (01-10-20 @ 04:35), Max: 37.1 (01-09-20 @ 20:00)    HR: 79 (01-10-20 @ 04:35) (68 - 83)    BP: 112/53 (01-10-20 @ 04:35) (110/54 - 142/61)    RR: 18 (01-10-20 @ 04:35) (16 - 19)    SpO2: 96% (01-10-20 @ 03:20) (96% - 96%)        GENERAL: NAD, well-developed, 74y sitting in bed, awake    EENT: EOMI, conjunctiva and sclera clear, No nasal obstruction or discharge    RESPIRATORY: Clear to auscultation bilaterally; No wheeze or crackles    CARDIOVASCULAR: Regular rate and rhythm; No rubs, or gallops, no pitting edema. +Harsh systolic murmur best heard at R 2nd intercostal space    GASTROINTESTINAL: Abdomen Soft, Nontender, Nondistended    MUSCULOSKELETAL:  No cyanosis, extremities grossly symmetrical    PSYCH: AAOx3, affect appropriate    NEUROLOGY: non-focal, cognition grossly intact, MAEE    ENDOCRINE:  +pronounced central alopecia, diffuse xerosis

## 2020-01-10 NOTE — PROGRESS NOTE ADULT - ASSESSMENT
74 y F with PMH of HTN, presents with abnormal lab results.    # Severe Microcytic Anemia w/ symptoms (SOB)  Hb is 6.5, MCV 79  Vitamin B12 and folate are nl  iron studies c/w DARRIN (? cause)  FeSO4 q24 + MVI q24  EGD noted: nl esoph; mild erosive antral gastritis; mild duodenitis -> f/u bx results  Cap Endo - completed -> f/u results  C-Scope: rpt as outpt  GI f/u  if oral iron does not fix anemia and incr ferritin level in 4-6 wks, then need hem eval (prob need IV iron at that point)  change PPI to protonix 40mg po q24    # weak gamma-migrating paraprotein  no major evid for MM  Hem-Onc Consult can be outpt    # HTN  On metoprolol, lasix    # CKD 4  Can be part of cause of anemia, but not entire cause  should cont to see renal as outpt; consider Epo  Stable    # CXR w/ vasc congestion - has on/off symptoms for chest discomfort and SOB  pt is asymp  prob from CKD, body habitus, borderline pulm HTN (see old echo) and poss JOSETTE   can be followed up w/ PMD -> perhaps pulm eval for PSNG  BP is well controlled    # Eczema  Benadryl PRN    # Occ vaginal spotting by hx  told pt to see GYN w/in 1-2 months (sooner if bleeding more severe)    # BMI 36.8 = obesity  wt loss advised  we reviewed diet/exercise today    # DVT - SCDs and ambulate; avoid heparins    # GI -PPI po q24    # FULL CODE    Dispo: replace iron; f/u results cap endo; f/u GI; outpt renal eval; outpt GYN eval; likely d/c home today

## 2020-01-10 NOTE — PROGRESS NOTE ADULT - SUBJECTIVE AND OBJECTIVE BOX
Nephrology progress note    Patient was seen and examined, events over the last 24 h noted .  s/p EGD yesterday   feeling well     Allergies:  latex (Unknown)  penicillins (Unknown)    Hospital Medications:   MEDICATIONS  (STANDING):  ferrous    sulfate 325 milliGRAM(s) Oral two times a day  metoprolol succinate ER 25 milliGRAM(s) Oral daily  multivitamin 1 Tablet(s) Oral daily  nystatin Cream 1 Application(s) Topical two times a day  pantoprazole  Injectable 40 milliGRAM(s) IV Push two times a day        VITALS:  T(F): 97.6 (01-10-20 @ 04:35), Max: 98.7 (01-09-20 @ 20:00)  HR: 79 (01-10-20 @ 04:35)  BP: 112/53 (01-10-20 @ 04:35)  RR: 18 (01-10-20 @ 04:35)  SpO2: 96% (01-10-20 @ 03:20)  Wt(kg): --    Height (cm): 170.18 (01-09 @ 10:25)  Weight (kg): 106.6 (01-09 @ 10:25)  BMI (kg/m2): 36.8 (01-09 @ 10:25)  BSA (m2): 2.17 (01-09 @ 10:25)    PHYSICAL EXAM:  Constitutional: NAD  HEENT: anicteric sclera, oropharynx clear, MMM  Neck: No JVD  Respiratory: CTAB, no wheezes, rales or rhonchi  Cardiovascular: S1, S2, RRR  Gastrointestinal: BS+, soft, NT/ND  Extremities: No cyanosis or clubbing. No peripheral edema  :  No culver.   Skin: No rashes    LABS:  01-10    140  |  108  |  29<H>  ----------------------------<  88  4.5   |  21  |  2.4<H>    Ca    8.7      10 Kb 2020 07:02  Mg     1.8     01-10    TPro  6.9  /  Alb  3.5<L>  /  TBili      /  DBili      /  AST      /  ALT      /  AlkPhos      01-08                          8.9    6.64  )-----------( 180      ( 10 Kb 2020 07:02 )             29.4       Urine Studies:      RADIOLOGY & ADDITIONAL STUDIES:

## 2020-01-10 NOTE — PROGRESS NOTE ADULT - SUBJECTIVE AND OBJECTIVE BOX
ARREAGALATRICIA BARRON  74y  Female  ***My note supersedes ALL resident notes that I sign.  My corrections for their notes are in my note.***    I can be reached directly on [employer] [address]. My office number is [phone]. My personal cell number is [phone].    INTERVAL EVENTS: Here for f/u of anemia. Pt doing Ok and would like to go home. Pt does have on/off chest discomfort (not pain) and SOB. Pt fine now. We spoke at length about diet and exercise for wt loss.    T(F): 97.6 (01-10-20 @ 04:35), Max: 98.7 (01-09-20 @ 20:00)  HR: 79 (01-10-20 @ 04:35) (72 - 83)  BP: 112/53 (01-10-20 @ 04:35) (112/53 - 142/61)  RR: 18 (01-10-20 @ 04:35) (16 - 19)  SpO2: 96% (01-10-20 @ 03:20) (96% - 96%)    Gen: NAD  HEENT: WNL  Neck: no JVD; no nodes; thyroid nl  lungs: clr  hrt: s1 s2 rrr  abd: soft, obese, NT/ND, body size precludes ability to discern any masses  ext: no edema, no c/c  neuro: WNL    LABS:                        8.9     (    81.2   6.64  )-----------( ---------      180      ( 10 Kb 2020 07:02 )             29.4    (    17.2     140   (   108   (   88      01-10-20 @ 07:02  ----------------------               4.5   (   21   (   29                             -----                        2.4  Ca  8.7   Mg  1.8    P   --     CARDIAC MARKERS ( 10 Kb 2020 07:02 )  x     / <0.01 ng/mL / x     / x     / x        RADIOLOGY & ADDITIONAL TESTS:      MEDICATIONS:    bisacodyl Suppository 10 milliGRAM(s) Rectal daily PRN  diphenhydrAMINE 25 milliGRAM(s) Oral every 6 hours PRN  ferrous    sulfate 325 milliGRAM(s) Oral two times a day  metoprolol succinate ER 25 milliGRAM(s) Oral daily  multivitamin 1 Tablet(s) Oral daily  nystatin Cream 1 Application(s) Topical two times a day  pantoprazole  Injectable 40 milliGRAM(s) IV Push two times a day  polyethylene glycol 3350 17 Gram(s) Oral daily PRN

## 2020-01-10 NOTE — DISCHARGE NOTE PROVIDER - NSDCMRMEDTOKEN_GEN_ALL_CORE_FT
Benadryl 12.5 mg oral tablet, chewable: 2 tab(s) orally every 6 hours, As Needed  metoprolol succinate 25 mg oral tablet, extended release: 1 tab(s) orally once a day  Multiple Vitamins oral tablet: 1 tab(s) orally once a day  Protonix 40 mg oral delayed release tablet: 1 tab(s) orally 2 times a day Benadryl 12.5 mg oral tablet, chewable: 2 tab(s) orally every 6 hours, As Needed  metoprolol succinate 25 mg oral tablet, extended release: 1 tab(s) orally once a day  Multiple Vitamins oral tablet: 1 tab(s) orally once a day Benadryl 12.5 mg oral tablet, chewable: 2 tab(s) orally every 6 hours, As Needed  FeroSul 325 mg (65 mg elemental iron) oral tablet: 1 tab(s) orally once a day   metoprolol succinate 25 mg oral tablet, extended release: 1 tab(s) orally once a day  Multiple Vitamins oral tablet: 1 tab(s) orally once a day  Protonix 40 mg oral delayed release tablet: 1 tab(s) orally once a day

## 2020-01-11 LAB
KAPPA LC SER QL IFE: 12.18 MG/DL — HIGH (ref 0.33–1.94)
KAPPA/LAMBDA FREE LIGHT CHAIN RATIO, SERUM: 2.09 RATIO — HIGH (ref 0.26–1.65)
LAMBDA LC SER QL IFE: 5.84 MG/DL — HIGH (ref 0.57–2.63)

## 2020-01-13 PROCEDURE — 86077 PHYS BLOOD BANK SERV XMATCH: CPT

## 2020-01-15 DIAGNOSIS — M19.90 UNSPECIFIED OSTEOARTHRITIS, UNSPECIFIED SITE: ICD-10-CM

## 2020-01-15 DIAGNOSIS — I35.0 NONRHEUMATIC AORTIC (VALVE) STENOSIS: ICD-10-CM

## 2020-01-15 DIAGNOSIS — L30.9 DERMATITIS, UNSPECIFIED: ICD-10-CM

## 2020-01-15 DIAGNOSIS — I25.10 ATHEROSCLEROTIC HEART DISEASE OF NATIVE CORONARY ARTERY WITHOUT ANGINA PECTORIS: ICD-10-CM

## 2020-01-15 DIAGNOSIS — Z91.040 LATEX ALLERGY STATUS: ICD-10-CM

## 2020-01-15 DIAGNOSIS — L65.9 NONSCARRING HAIR LOSS, UNSPECIFIED: ICD-10-CM

## 2020-01-15 DIAGNOSIS — Z87.891 PERSONAL HISTORY OF NICOTINE DEPENDENCE: ICD-10-CM

## 2020-01-15 DIAGNOSIS — E03.9 HYPOTHYROIDISM, UNSPECIFIED: ICD-10-CM

## 2020-01-15 DIAGNOSIS — K29.80 DUODENITIS WITHOUT BLEEDING: ICD-10-CM

## 2020-01-15 DIAGNOSIS — K29.70 GASTRITIS, UNSPECIFIED, WITHOUT BLEEDING: ICD-10-CM

## 2020-01-15 DIAGNOSIS — N18.4 CHRONIC KIDNEY DISEASE, STAGE 4 (SEVERE): ICD-10-CM

## 2020-01-15 DIAGNOSIS — M10.9 GOUT, UNSPECIFIED: ICD-10-CM

## 2020-01-15 DIAGNOSIS — I13.0 HYPERTENSIVE HEART AND CHRONIC KIDNEY DISEASE WITH HEART FAILURE AND STAGE 1 THROUGH STAGE 4 CHRONIC KIDNEY DISEASE, OR UNSPECIFIED CHRONIC KIDNEY DISEASE: ICD-10-CM

## 2020-01-15 DIAGNOSIS — Z88.0 ALLERGY STATUS TO PENICILLIN: ICD-10-CM

## 2020-01-15 DIAGNOSIS — I50.32 CHRONIC DIASTOLIC (CONGESTIVE) HEART FAILURE: ICD-10-CM

## 2020-01-15 DIAGNOSIS — D50.9 IRON DEFICIENCY ANEMIA, UNSPECIFIED: ICD-10-CM

## 2020-01-15 DIAGNOSIS — N93.8 OTHER SPECIFIED ABNORMAL UTERINE AND VAGINAL BLEEDING: ICD-10-CM

## 2020-02-20 ENCOUNTER — TRANSCRIPTION ENCOUNTER (OUTPATIENT)
Age: 75
End: 2020-02-20

## 2020-02-20 ENCOUNTER — OUTPATIENT (OUTPATIENT)
Dept: OUTPATIENT SERVICES | Facility: HOSPITAL | Age: 75
LOS: 1 days | Discharge: HOME | End: 2020-02-20
Payer: MEDICARE

## 2020-02-20 ENCOUNTER — RESULT REVIEW (OUTPATIENT)
Age: 75
End: 2020-02-20

## 2020-02-20 VITALS
HEIGHT: 67 IN | TEMPERATURE: 98 F | DIASTOLIC BLOOD PRESSURE: 63 MMHG | HEART RATE: 70 BPM | SYSTOLIC BLOOD PRESSURE: 137 MMHG | WEIGHT: 229.94 LBS | RESPIRATION RATE: 18 BRPM

## 2020-02-20 VITALS
DIASTOLIC BLOOD PRESSURE: 58 MMHG | SYSTOLIC BLOOD PRESSURE: 121 MMHG | OXYGEN SATURATION: 94 % | HEART RATE: 68 BPM | RESPIRATION RATE: 16 BRPM

## 2020-02-20 DIAGNOSIS — Z87.19 PERSONAL HISTORY OF OTHER DISEASES OF THE DIGESTIVE SYSTEM: Chronic | ICD-10-CM

## 2020-02-20 PROCEDURE — 44377 SMALL BOWEL ENDOSCOPY/BIOPSY: CPT

## 2020-02-20 PROCEDURE — 88305 TISSUE EXAM BY PATHOLOGIST: CPT | Mod: 26

## 2020-02-20 PROCEDURE — 43236 UPPR GI SCOPE W/SUBMUC INJ: CPT | Mod: XU

## 2020-02-20 PROCEDURE — 88312 SPECIAL STAINS GROUP 1: CPT | Mod: 26

## 2020-02-20 RX ORDER — METOCLOPRAMIDE HCL 10 MG
10 TABLET ORAL ONCE
Refills: 0 | Status: DISCONTINUED | OUTPATIENT
Start: 2020-02-20 | End: 2020-03-07

## 2020-02-20 RX ORDER — ONDANSETRON 8 MG/1
4 TABLET, FILM COATED ORAL ONCE
Refills: 0 | Status: COMPLETED | OUTPATIENT
Start: 2020-02-20 | End: 2020-02-20

## 2020-02-20 RX ADMIN — ONDANSETRON 4 MILLIGRAM(S): 8 TABLET, FILM COATED ORAL at 16:55

## 2020-02-20 NOTE — ASU DISCHARGE PLAN (ADULT/PEDIATRIC) - CALL YOUR DOCTOR IF YOU HAVE ANY OF THE FOLLOWING:
Fever greater than (need to indicate Fahrenheit or Celsius)/Pain not relieved by Medications/Bleeding that does not stop/Nausea and vomiting that does not stop

## 2020-02-20 NOTE — CHART NOTE - NSCHARTNOTEFT_GEN_A_CORE
PACU ANESTHESIA ADMISSION NOTE      Procedure: EGD, enteroscopy  Post op diagnosis:  gastritis, anemia    ____  Intubated  TV:______       Rate: ______      FiO2: ______    _x___  Patent Airway    _x___  Full return of protective reflexes    _x___  Full recovery from anesthesia / back to baseline status    Vitals:  T(C): 36  HR: 72  BP: 127/59  RR: 16  SpO2: 99%    Mental Status:  _x___ Awake   __x___ Alert   _____ Drowsy   _____ Sedated    Nausea/Vomiting:  _x___  NO       ______Yes,   See Post - Op Orders         Pain Scale (0-10):  __0___    Treatment: _x___ None    ____ See Post - Op/PCA Orders    Post - Operative Fluids:   see post-op orders  Plan: Discharge:   _x___Home       _____Floor     _____Critical Care    _____  Other:_________________    Comments:  No anesthesia issues or complications noted.  Discharge when criteria met.

## 2020-02-20 NOTE — H&P PST ADULT - RESPIRATORY
[facility]  Hospitalist Progress Note  Patient Name: Donald Raza    MRN: [id number]  Provider: Jovi Hayes MD  10/24/19    Initial presenting complaint/issue to hospital (Diagnosis): As of breath, diaphoresis, chest pain, and elevated blood pressure         Assessment and Plan:      Summary of Stay: Donald Raza is a 70 year old male with history of CAD with prior stenting to the LAD, preserved ejection fraction of 55 to 60% by echocardiogram in 5/2019, TIA, end-stage renal disease on hemodialysis, type 2 diabetes, HIV for which he is on anti-retroviral therapy, hypertension, HLP, multiple admissions for anginal type chest pain with negative work-ups, and history of uncontrolled hypertension in the setting of variable compliance with multiple medications and dialysis.  He presented to the ED on 10/15/2019 with shortness of breath, diaphoresis, chest pain, and elevated blood pressure.  Work-up showed mild troponin elevation.  Echocardiogram showed no wall motion abnormality but did show some progression of aortic stenosis.  Overall, his presentation was thought to be due to pretense of urgency. He underwent dialysis with ultrafiltration as well as aggressive antihypertensive management and remained unwell.    He had symptoms of dizziness, nausea, and shortness of breath without diaphoresis. He had a single episode of chest pain. This did not seem consistent with acute coronary artery disease but possibly due to a relative hypotensive state. He improved after gentle fluid administration but showed symptoms of altered mental status. Over night into 10/22 Donald developed a wide-complex QRS in the setting of evolving hyperkalemia at 6.5.  He underwent urgent dialysis and resolution of hyperkalemia and the wide-complex QRS. He continued to have waxing and waning level of alertness but ultimately seemed to improve on 10/24/19.      Problem List:   1. Hypertensive urgency: PTA regimen was carvedilol 25  mg twice daily, amlodipine 5 mg p.o. twice daily, isosorbide 120 mg p.o. daily, irbesartan 300 mg p.o. daily, and clonidine 0.1 mg p.o. daily as needed.  He was admitted and kept on his carvedilol, amlodipine, isosorbide, and irbesartan. His clonidine was titrated up to 0.1 mg p.o. twice daily scheduled.  He also underwent ultrafiltration HD and subsequent over control of blood pressures.  He reports baseline blood pressures are in the 160s to 170s and that he feels symptomatic with dizziness when blood pressures are in the 130s over 60s.  Ultimately, Amlodipine was discontinued. We have set staggered parameters for rest of his medications favoring carvedilol and isosorbide. He still had variable BPs so carvedilol was decreased to 12.5 mg bid with parameters. We have continued the clonidine but only on an as needed basis.  2. General sense of feeling unwell/waxing and waning mental status: Etiology is not quite clear, he did have some transient chest pain that resolved spontaneously 10/22/19 on dialysis and this is an one who has frequent episodes of chest pain.  His last CAD evaluation was 12/2018 with a Lexiscan that was negative for active ischemia.  He has not had a fever.  Labs for Donald actually look pretty good.  Continue to monitor closely, allow higher blood pressures in case over control of BP is the primary cause.  No toxic/infectious/metabolic cause identified. He seems better today.   3. Intermittent lethargy:  Consider metabolic encephalopathy due to hypertensive urgency or poor sleep. Seems improved today.  4. CAD: Mildly elevated troponin setting of illness and ESRD, no evidence of active CAD. Echo on admission shows EF 60-65 %, slight progression of Ao stenosis and estimated as mild to moderate, mod cLVH. Continue antiplatelets, Imdur, beta-blocker as BPs tolerate  5. Mild hypoxia, no evidence of pulmonary edema, looks most consistent with poor inspiration.  Asymptomatic  6. End-stage renal disease  "on hemodialysis: Complicated by hyperkalemia at 6.5.  Resolved with appropriate dialysis.  Appreciate nephrology involvement, status post HD.  Baseline he dialyzes through a left upper extremity AVF 4 times weekly: Monday/Tuesday/Thursday/Saturday. Dialyzed today.   7. Wide-complex QRS in the setting of hyperkalemia: Discussed with cardiology by my colleague and they wonder if he has a periodic left bundle.  They do not think that this would be the reason for his sense of feeling unwell.  8. T2 DM: PTA regimen glargine 24 units twice daily, lispro 14 units 3 times daily with meals, and 2 units for every 50/150 3 times daily.  Currently on glargine 9 units at bedtime, 6 units every morning, and insulin sliding scale.  Blood sugars are under excellent control likely due to poor p.o. intake  9. COPD: Non-oxygen dependent, continue home inhalers  10. HIV/hyper lipidemia: Continue antiretrovirals and statin as at home  11. Deconditioning. PT has recommended TCU on discharge. Patient is reluctant and hopes to be able to go home with home services again. I will have PT reassess tomorrow as he seems to be improving more quickly now.     DVT Prophylaxis: Heparin SQ  Code Status: Full Code  Functional Status: Independent at baseline  Diet: Dialysis  Christie: Not in place  Disposition. TCU versus home with services in 2-3 days.       Interval History:      Better today.  Less confusion.  Able to do a bit more.  Reluctant to agree to transitional care.                  Physical Exam:      Last Vital Signs:  BP (!) 147/67   Pulse 60   Temp 98.5  F (36.9  C) (Oral)   Resp 18   Ht 1.803 m (5' 11\")   Wt 90.2 kg (198 lb 13.7 oz)   SpO2 95%   BMI 27.73 kg/m      Intake/Output Summary (Last 24 hours) at 10/24/2019 1615  Last data filed at 10/24/2019 1230  Gross per 24 hour   Intake 240 ml   Output 3000 ml   Net -2760 ml     GENERAL:  Comfortable. Cooperative.  PSYCH: pleasant, oriented, No acute distress.  EYES: PERRLA, Normal " conjunctiva.  HEART:  Regular rate and rhythm. No JVD. Pulses normal. No edema.  LUNGS:  Clear to auscultation, normal Respiratory effort.  ABDOMEN:  Soft, no hepatosplenomegaly, normal bowel sounds.  EXTREMETIES: No clubbing, cyanosis or ischemia  SKIN:  Dry to touch, No rash.           Medications:      All current medications were reviewed.         Data:      All new lab and imaging data was reviewed.   Labs:       Lab Results   Component Value Date     10/24/2019     10/23/2019     10/22/2019    Lab Results   Component Value Date    CHLORIDE 89 10/24/2019    CHLORIDE 90 10/23/2019    CHLORIDE 92 10/22/2019    Lab Results   Component Value Date    BUN 92 10/24/2019    BUN 70 10/23/2019    BUN 96 10/22/2019      Lab Results   Component Value Date    POTASSIUM 5.1 10/24/2019    POTASSIUM 4.5 10/23/2019    POTASSIUM 4.6 10/22/2019    Lab Results   Component Value Date    CO2 27 10/24/2019    CO2 29 10/23/2019    CO2 24 10/22/2019    Lab Results   Component Value Date    CR 10.30 10/24/2019    CR 8.55 10/23/2019    CR 10.70 10/22/2019        Recent Labs   Lab 10/24/19  0825 10/23/19  1019 10/22/19  0656 10/21/19  0718 10/20/19  0542 10/18/19  0546   WBC  --  5.1  --   --  5.0 5.1   HGB 8.2* 8.5* 9.0*  --  9.3* 9.0*   HCT  --  28.5*  --   --  31.9* 31.0*   MCV  --  93  --   --  94 95    134*  --  142* 136* 118*             Breath Sounds equal & clear to percussion & auscultation, no accessory muscle use

## 2020-02-20 NOTE — PRE-ANESTHESIA EVALUATION ADULT - LAST ECHOCARDIOGRAM
10/20/2019; LVEF ~ 68%, severe aortic stenosis, AoV area 0.91 cm2, AoV gradient 35 mm Hg, mild aortic regurgitation

## 2020-02-20 NOTE — PRE-ANESTHESIA EVALUATION ADULT - NSANTHPEFT_GEN_ALL_CORE
Morbidly obese WF in NAD.  A & O X 3  Chest:  BLBS, ronchi present  Cor:  Reg. S1S2 Gr. 1/6 systolic murmur

## 2020-02-24 LAB — SURGICAL PATHOLOGY STUDY: SIGNIFICANT CHANGE UP

## 2020-02-26 DIAGNOSIS — Z91.040 LATEX ALLERGY STATUS: ICD-10-CM

## 2020-02-26 DIAGNOSIS — K29.50 UNSPECIFIED CHRONIC GASTRITIS WITHOUT BLEEDING: ICD-10-CM

## 2020-02-26 DIAGNOSIS — K29.80 DUODENITIS WITHOUT BLEEDING: ICD-10-CM

## 2020-02-26 DIAGNOSIS — Z88.0 ALLERGY STATUS TO PENICILLIN: ICD-10-CM

## 2020-02-26 DIAGNOSIS — D64.9 ANEMIA, UNSPECIFIED: ICD-10-CM

## 2020-04-08 ENCOUNTER — LABORATORY RESULT (OUTPATIENT)
Age: 75
End: 2020-04-08

## 2020-04-08 ENCOUNTER — APPOINTMENT (OUTPATIENT)
Dept: HEMATOLOGY ONCOLOGY | Facility: CLINIC | Age: 75
End: 2020-04-08
Payer: MEDICARE

## 2020-04-08 VITALS
WEIGHT: 259 LBS | DIASTOLIC BLOOD PRESSURE: 63 MMHG | BODY MASS INDEX: 41.62 KG/M2 | HEART RATE: 86 BPM | HEIGHT: 66 IN | SYSTOLIC BLOOD PRESSURE: 156 MMHG | TEMPERATURE: 96.3 F

## 2020-04-08 DIAGNOSIS — Z80.3 FAMILY HISTORY OF MALIGNANT NEOPLASM OF BREAST: ICD-10-CM

## 2020-04-08 LAB
HCT VFR BLD CALC: 21.4 %
HGB BLD-MCNC: 5.9 G/DL
MCHC RBC-ENTMCNC: 23 PG
MCHC RBC-ENTMCNC: 27.6 G/DL
MCV RBC AUTO: 83.3 FL
PLATELET # BLD AUTO: 175 K/UL
PMV BLD: 10.2 FL
RBC # BLD: 2.57 M/UL
RBC # FLD: 16.5 %
WBC # FLD AUTO: 5.36 K/UL

## 2020-04-08 PROCEDURE — 99205 OFFICE O/P NEW HI 60 MIN: CPT

## 2020-04-08 NOTE — PHYSICAL EXAM
[Ambulatory and capable of all self care but unable to carry out any work activities] : Status 2- Ambulatory and capable of all self care but unable to carry out any work activities. Up and about more than 50% of waking hours [Obese] : obese [Normal] : normoactive bowel sounds, soft and nontender, no hepatosplenomegaly or masses appreciated

## 2020-04-09 ENCOUNTER — APPOINTMENT (OUTPATIENT)
Dept: INFUSION THERAPY | Facility: CLINIC | Age: 75
End: 2020-04-09
Payer: MEDICARE

## 2020-04-09 VITALS
TEMPERATURE: 97.6 F | SYSTOLIC BLOOD PRESSURE: 114 MMHG | HEART RATE: 67 BPM | RESPIRATION RATE: 18 BRPM | DIASTOLIC BLOOD PRESSURE: 58 MMHG

## 2020-04-09 VITALS
SYSTOLIC BLOOD PRESSURE: 120 MMHG | TEMPERATURE: 97.7 F | RESPIRATION RATE: 16 BRPM | DIASTOLIC BLOOD PRESSURE: 53 MMHG | HEART RATE: 77 BPM

## 2020-04-09 VITALS
RESPIRATION RATE: 16 BRPM | HEART RATE: 69 BPM | SYSTOLIC BLOOD PRESSURE: 142 MMHG | DIASTOLIC BLOOD PRESSURE: 55 MMHG | TEMPERATURE: 97 F

## 2020-04-09 LAB
ABO + RH PNL BLD: NORMAL
ALBUMIN SERPL ELPH-MCNC: 4.1 G/DL
ALP BLD-CCNC: 94 U/L
ALT SERPL-CCNC: <5 U/L
ANION GAP SERPL CALC-SCNC: 10 MMOL/L
AST SERPL-CCNC: 14 U/L
BILIRUB SERPL-MCNC: 0.5 MG/DL
BLD GP AB SCN SERPL QL: NORMAL
BUN SERPL-MCNC: 34 MG/DL
CALCIUM SERPL-MCNC: 8.6 MG/DL
CHLORIDE SERPL-SCNC: 108 MMOL/L
CO2 SERPL-SCNC: 21 MMOL/L
CREAT SERPL-MCNC: 2.1 MG/DL
DEPRECATED KAPPA LC FREE/LAMBDA SER: 1.85 RATIO
FERRITIN SERPL-MCNC: 12 NG/ML
GLUCOSE SERPL-MCNC: 87 MG/DL
IGA SER QL IEP: 168 MG/DL
IGG SER QL IEP: 1404 MG/DL
IGM SER QL IEP: 196 MG/DL
IRON SATN MFR SERPL: 6 %
IRON SERPL-MCNC: 21 UG/DL
KAPPA LC CSF-MCNC: 6.31 MG/DL
KAPPA LC SERPL-MCNC: 11.67 MG/DL
POTASSIUM SERPL-SCNC: 5.7 MMOL/L
PROT SERPL-MCNC: 7.1 G/DL
RETICS # AUTO: 1.3 %
RETICS AGGREG/RBC NFR: 48.9 K/UL
SODIUM SERPL-SCNC: 139 MMOL/L
TIBC SERPL-MCNC: 375 UG/DL
UIBC SERPL-MCNC: 354 UG/DL

## 2020-04-09 PROCEDURE — 86077 PHYS BLOOD BANK SERV XMATCH: CPT

## 2020-04-09 RX ORDER — ACETAMINOPHEN 500 MG
650 TABLET ORAL ONCE
Refills: 0 | Status: COMPLETED | OUTPATIENT
Start: 2020-04-09 | End: 2020-04-09

## 2020-04-09 RX ORDER — HYDROCORTISONE 20 MG
100 TABLET ORAL ONCE
Refills: 0 | Status: COMPLETED | OUTPATIENT
Start: 2020-04-09 | End: 2020-04-09

## 2020-04-09 RX ORDER — FERUMOXYTOL 510 MG/17ML
510 INJECTION INTRAVENOUS ONCE
Refills: 0 | Status: COMPLETED | OUTPATIENT
Start: 2020-04-09 | End: 2020-04-09

## 2020-04-09 RX ORDER — FUROSEMIDE 40 MG
20 TABLET ORAL ONCE
Refills: 0 | Status: COMPLETED | OUTPATIENT
Start: 2020-04-09 | End: 2020-04-09

## 2020-04-09 RX ADMIN — FERUMOXYTOL 156 MILLIGRAM(S): 510 INJECTION INTRAVENOUS at 15:33

## 2020-04-09 RX ADMIN — Medication 20 MILLIGRAM(S): at 15:33

## 2020-04-09 RX ADMIN — Medication 650 MILLIGRAM(S): at 15:32

## 2020-04-09 RX ADMIN — Medication 100 MILLIGRAM(S): at 15:32

## 2020-04-09 NOTE — HISTORY OF PRESENT ILLNESS
[de-identified] : Patient is a 74 year old white female with hypertension, chronic kidney disease, morbidly obese presents today with normocytic anemia. \par Her most recent CBC 04/01/2020 shows WBC -5.66, HB of 6.7, MCV -86.3, RDW - 17.3, platelet count- 196. Her Hb was normal until 08/2019. In October 2019 she noticed black tarry stools and was feeling tired. She went to ER and her Hb was 5.3. She was given 3 units of PRBC. Her iron studies at that showed ferritin of 8 and percent saturation of 5. She had EGD and colonoscopy which did not reveal any bleeding lesions. Following that event as per patient she was not given iron (?not sure why). She was readmitted to hospital in 01/2020 for SOB on exertion and her HB was noted to 6.0 again. She was given 2 units and her iron studies were consistent with DARRIN. B12 and folate were normal. Immunofixation showed weak IgG lambda migrating para protein. Free light chain ratio 2.1. Normal calcium. \par EGD and VCE was done. EGD revealed gastritis and VCE showed bleeding in small bowel. \par She was started on oral iron and she took for about three months. \par Her latest ferritin done in feb 2020 was 24 and percent saturation was 72% and her hb improved to 8.8. She also has push enteroscopy in 02/2020 and no bleeding lesions were found. Was recommended to have repeat colonoscopy and double balloon enteroscopy if she bleeds again.\par She went for blood work again on 04/01/2020 as she was feeling weak and having SOB and her hb dropped to 6.7. She was told by PMD to start on PROCRIT 10,000 units M-W-F. She was told her iron levels are good and she can stop iron. \par \par Today she feels very tired and her SOB is worse. She denies any melena or BRBPR in last few days. She does have anal fissure and hemorrhoids and bleeds when she is constipated. She denies hematuria or post menopausal bleeding. Denies weight loss. Does not take any NSAIDs or aspirin. She has not followed up with nephrology before.\par Family history is significant for breast cancer in her mother. She is a former smoker stopped 20 years ago.\par She is uptodate with mammogram and Pap smear. \par

## 2020-04-09 NOTE — ASSESSMENT
[FreeTextEntry1] : 74 year old female presents with symptomatic normocytic anemia. \par History is significant for melena and bleeding per rectum from anal fissure and CKD stage 4. S/p 6 units of PRBC from 10/2019-02/2020. \par EGD/Colonoscopy done twice - No bleeding lesions, Gastritis in antral area. \par VCE 01/2020- bleeding seen in small bowel. \par Push enteroscopy 02/2020- No bleeding seen. \par On oral iron from 01/2020- 04/01/2020. \par Procrit 10,000 thrice weekly initiated by PMD 04/06/2020. \par \par Plan:\par CBC repeated here today shows HB of 5.9. She is clearly symptomatic and will need transfusion. Prior type and cross noted and she has anti- c and little e antibodies. Patti was negative. Will schedule her to get 2 units PRBC transfused ( 04/10/2020)\par Etiology is likely that she is having obscure bleeding in addition of anemia of chronic kidney disease. \par We explained that her iron stores have to be checked prior to initiating procrit. Will repeat iron studies. If ferritin is below 100 and percent saturation <20 she will benefit from IV venofer and then procrit can be resumed.\par Once she is stable she will need GI work up again with double balloon enteroscopy as suggested By Dr. Perez. \par To complete anemia work up we will draw retic and myeloma panel,LDH as well. \par \par Patient seen and examined with Dr. Gaston.\par \par

## 2020-04-09 NOTE — REVIEW OF SYSTEMS
[Fatigue] : fatigue [Shortness Of Breath] : shortness of breath [Joint Pain] : joint pain [Negative] : Allergic/Immunologic [Fever] : no fever [Chills] : no chills [Night Sweats] : no night sweats [Recent Change In Weight] : ~T no recent weight change [Lower Ext Edema] : no lower extremity edema

## 2020-04-17 ENCOUNTER — LABORATORY RESULT (OUTPATIENT)
Age: 75
End: 2020-04-17

## 2020-04-17 ENCOUNTER — APPOINTMENT (OUTPATIENT)
Dept: INFUSION THERAPY | Facility: CLINIC | Age: 75
End: 2020-04-17

## 2020-04-17 DIAGNOSIS — Z00.00 ENCOUNTER FOR GENERAL ADULT MEDICAL EXAMINATION W/OUT ABNORMAL FINDINGS: ICD-10-CM

## 2020-04-17 LAB
HCT VFR BLD CALC: 29.5 %
HGB BLD-MCNC: 8.9 G/DL
MCHC RBC-ENTMCNC: 26 PG
MCHC RBC-ENTMCNC: 30.2 G/DL
MCV RBC AUTO: 86.3 FL
PLATELET # BLD AUTO: 117 K/UL
PMV BLD: 9.3 FL
RBC # BLD: 3.42 M/UL
RBC # FLD: 20.9 %
WBC # FLD AUTO: 6.34 K/UL

## 2020-04-17 RX ORDER — IRON SUCROSE 20 MG/ML
200 INJECTION, SOLUTION INTRAVENOUS ONCE
Refills: 0 | Status: COMPLETED | OUTPATIENT
Start: 2020-04-17 | End: 2020-04-17

## 2020-04-17 RX ORDER — ERYTHROPOIETIN 10000 [IU]/ML
10000 INJECTION, SOLUTION INTRAVENOUS; SUBCUTANEOUS ONCE
Refills: 0 | Status: COMPLETED | OUTPATIENT
Start: 2020-04-17 | End: 2020-04-17

## 2020-04-17 RX ADMIN — ERYTHROPOIETIN 10000 UNIT(S): 10000 INJECTION, SOLUTION INTRAVENOUS; SUBCUTANEOUS at 11:39

## 2020-04-17 RX ADMIN — IRON SUCROSE 200 MILLIGRAM(S): 20 INJECTION, SOLUTION INTRAVENOUS at 12:10

## 2020-04-17 RX ADMIN — IRON SUCROSE 346.67 MILLIGRAM(S): 20 INJECTION, SOLUTION INTRAVENOUS at 11:39

## 2020-04-24 ENCOUNTER — LABORATORY RESULT (OUTPATIENT)
Age: 75
End: 2020-04-24

## 2020-04-24 ENCOUNTER — APPOINTMENT (OUTPATIENT)
Dept: INFUSION THERAPY | Facility: CLINIC | Age: 75
End: 2020-04-24

## 2020-04-24 LAB
HCT VFR BLD CALC: 31.6 %
HGB BLD-MCNC: 9.5 G/DL
MCHC RBC-ENTMCNC: 26.3 PG
MCHC RBC-ENTMCNC: 30.1 G/DL
MCV RBC AUTO: 87.5 FL
PLATELET # BLD AUTO: 153 K/UL
PMV BLD: 9.5 FL
RBC # BLD: 3.61 M/UL
RBC # FLD: 22.3 %
WBC # FLD AUTO: 4.98 K/UL

## 2020-04-24 RX ORDER — IRON SUCROSE 20 MG/ML
200 INJECTION, SOLUTION INTRAVENOUS ONCE
Refills: 0 | Status: COMPLETED | OUTPATIENT
Start: 2020-04-24 | End: 2020-04-24

## 2020-04-24 RX ORDER — ERYTHROPOIETIN 10000 [IU]/ML
10000 INJECTION, SOLUTION INTRAVENOUS; SUBCUTANEOUS ONCE
Refills: 0 | Status: COMPLETED | OUTPATIENT
Start: 2020-04-24 | End: 2020-04-24

## 2020-04-24 RX ADMIN — ERYTHROPOIETIN 10000 UNIT(S): 10000 INJECTION, SOLUTION INTRAVENOUS; SUBCUTANEOUS at 11:54

## 2020-04-24 RX ADMIN — IRON SUCROSE 146.67 MILLIGRAM(S): 20 INJECTION, SOLUTION INTRAVENOUS at 11:54

## 2020-04-24 RX ADMIN — IRON SUCROSE 200 MILLIGRAM(S): 20 INJECTION, SOLUTION INTRAVENOUS at 12:30

## 2020-05-01 ENCOUNTER — LABORATORY RESULT (OUTPATIENT)
Age: 75
End: 2020-05-01

## 2020-05-01 ENCOUNTER — APPOINTMENT (OUTPATIENT)
Dept: INFUSION THERAPY | Facility: CLINIC | Age: 75
End: 2020-05-01

## 2020-05-01 LAB
HCT VFR BLD CALC: 32.2 %
HGB BLD-MCNC: 9.8 G/DL
MCHC RBC-ENTMCNC: 27 PG
MCHC RBC-ENTMCNC: 30.4 G/DL
MCV RBC AUTO: 88.7 FL
PLATELET # BLD AUTO: 129 K/UL
PMV BLD: 9.7 FL
RBC # BLD: 3.63 M/UL
RBC # FLD: 21.5 %
WBC # FLD AUTO: 5.56 K/UL

## 2020-05-01 RX ORDER — ERYTHROPOIETIN 10000 [IU]/ML
10000 INJECTION, SOLUTION INTRAVENOUS; SUBCUTANEOUS ONCE
Refills: 0 | Status: COMPLETED | OUTPATIENT
Start: 2020-05-01 | End: 2020-05-01

## 2020-05-01 RX ADMIN — ERYTHROPOIETIN 10000 UNIT(S): 10000 INJECTION, SOLUTION INTRAVENOUS; SUBCUTANEOUS at 12:46

## 2020-05-08 ENCOUNTER — APPOINTMENT (OUTPATIENT)
Dept: INFUSION THERAPY | Facility: CLINIC | Age: 75
End: 2020-05-08

## 2020-05-08 ENCOUNTER — LABORATORY RESULT (OUTPATIENT)
Age: 75
End: 2020-05-08

## 2020-05-08 LAB
HCT VFR BLD CALC: 33.8 %
HGB BLD-MCNC: 10.5 G/DL
MCHC RBC-ENTMCNC: 27.2 PG
MCHC RBC-ENTMCNC: 31.1 G/DL
MCV RBC AUTO: 87.6 FL
PLATELET # BLD AUTO: 116 K/UL
PMV BLD: 9.2 FL
RBC # BLD: 3.86 M/UL
RBC # FLD: 19.9 %
WBC # FLD AUTO: 4.62 K/UL

## 2020-05-15 ENCOUNTER — LABORATORY RESULT (OUTPATIENT)
Age: 75
End: 2020-05-15

## 2020-05-15 ENCOUNTER — APPOINTMENT (OUTPATIENT)
Dept: INFUSION THERAPY | Facility: CLINIC | Age: 75
End: 2020-05-15

## 2020-05-15 LAB
HCT VFR BLD CALC: 33 %
HGB BLD-MCNC: 10.2 G/DL
MCHC RBC-ENTMCNC: 27.1 PG
MCHC RBC-ENTMCNC: 30.9 G/DL
MCV RBC AUTO: 87.8 FL
PLATELET # BLD AUTO: 129 K/UL
PMV BLD: 10.2 FL
RBC # BLD: 3.76 M/UL
RBC # FLD: 18.6 %
WBC # FLD AUTO: 5.2 K/UL

## 2020-05-15 RX ORDER — ERYTHROPOIETIN 10000 [IU]/ML
10000 INJECTION, SOLUTION INTRAVENOUS; SUBCUTANEOUS ONCE
Refills: 0 | Status: COMPLETED | OUTPATIENT
Start: 2020-05-15 | End: 2020-05-15

## 2020-05-15 RX ADMIN — ERYTHROPOIETIN 10000 UNIT(S): 10000 INJECTION, SOLUTION INTRAVENOUS; SUBCUTANEOUS at 12:08

## 2020-05-20 ENCOUNTER — APPOINTMENT (OUTPATIENT)
Dept: HEMATOLOGY ONCOLOGY | Facility: CLINIC | Age: 75
End: 2020-05-20

## 2020-05-20 ENCOUNTER — OUTPATIENT (OUTPATIENT)
Dept: OUTPATIENT SERVICES | Facility: HOSPITAL | Age: 75
LOS: 1 days | Discharge: HOME | End: 2020-05-20

## 2020-05-20 DIAGNOSIS — Z11.59 ENCOUNTER FOR SCREENING FOR OTHER VIRAL DISEASES: ICD-10-CM

## 2020-05-20 DIAGNOSIS — Z87.19 PERSONAL HISTORY OF OTHER DISEASES OF THE DIGESTIVE SYSTEM: Chronic | ICD-10-CM

## 2020-05-21 LAB — SARS-COV-2 RNA SPEC QL NAA+PROBE: SIGNIFICANT CHANGE UP

## 2020-05-22 ENCOUNTER — APPOINTMENT (OUTPATIENT)
Dept: INFUSION THERAPY | Facility: CLINIC | Age: 75
End: 2020-05-22

## 2020-05-22 ENCOUNTER — LABORATORY RESULT (OUTPATIENT)
Age: 75
End: 2020-05-22

## 2020-05-22 LAB
HCT VFR BLD CALC: 32 %
HGB BLD-MCNC: 10.1 G/DL
MCHC RBC-ENTMCNC: 27.1 PG
MCHC RBC-ENTMCNC: 31.6 G/DL
MCV RBC AUTO: 85.8 FL
PLATELET # BLD AUTO: 136 K/UL
PMV BLD: 10.1 FL
RBC # BLD: 3.73 M/UL
RBC # FLD: 17.5 %
WBC # FLD AUTO: 5.13 K/UL

## 2020-05-22 RX ORDER — ERYTHROPOIETIN 10000 [IU]/ML
10000 INJECTION, SOLUTION INTRAVENOUS; SUBCUTANEOUS ONCE
Refills: 0 | Status: COMPLETED | OUTPATIENT
Start: 2020-05-22 | End: 2020-05-22

## 2020-05-22 RX ADMIN — ERYTHROPOIETIN 10000 UNIT(S): 10000 INJECTION, SOLUTION INTRAVENOUS; SUBCUTANEOUS at 14:03

## 2020-05-29 ENCOUNTER — APPOINTMENT (OUTPATIENT)
Dept: INFUSION THERAPY | Facility: CLINIC | Age: 75
End: 2020-05-29

## 2020-05-29 ENCOUNTER — LABORATORY RESULT (OUTPATIENT)
Age: 75
End: 2020-05-29

## 2020-05-29 LAB
HCT VFR BLD CALC: 31.4 %
HGB BLD-MCNC: 10.1 G/DL
MCHC RBC-ENTMCNC: 26.9 PG
MCHC RBC-ENTMCNC: 32.2 G/DL
MCV RBC AUTO: 83.7 FL
PLATELET # BLD AUTO: 144 K/UL
PMV BLD: 9.2 FL
RBC # BLD: 3.75 M/UL
RBC # FLD: 16.6 %
WBC # FLD AUTO: 4.96 K/UL

## 2020-05-29 RX ORDER — ERYTHROPOIETIN 10000 [IU]/ML
10000 INJECTION, SOLUTION INTRAVENOUS; SUBCUTANEOUS ONCE
Refills: 0 | Status: COMPLETED | OUTPATIENT
Start: 2020-05-29 | End: 2020-05-29

## 2020-05-29 RX ADMIN — ERYTHROPOIETIN 10000 UNIT(S): 10000 INJECTION, SOLUTION INTRAVENOUS; SUBCUTANEOUS at 11:55

## 2020-06-02 ENCOUNTER — OUTPATIENT (OUTPATIENT)
Dept: OUTPATIENT SERVICES | Facility: HOSPITAL | Age: 75
LOS: 1 days | Discharge: HOME | End: 2020-06-02
Payer: MEDICARE

## 2020-06-02 ENCOUNTER — APPOINTMENT (OUTPATIENT)
Dept: HEMATOLOGY ONCOLOGY | Facility: CLINIC | Age: 75
End: 2020-06-02

## 2020-06-02 DIAGNOSIS — Z87.19 PERSONAL HISTORY OF OTHER DISEASES OF THE DIGESTIVE SYSTEM: Chronic | ICD-10-CM

## 2020-06-03 LAB — SARS-COV-2 RNA SPEC QL NAA+PROBE: SIGNIFICANT CHANGE UP

## 2020-06-05 ENCOUNTER — LABORATORY RESULT (OUTPATIENT)
Age: 75
End: 2020-06-05

## 2020-06-05 ENCOUNTER — APPOINTMENT (OUTPATIENT)
Dept: INFUSION THERAPY | Facility: CLINIC | Age: 75
End: 2020-06-05

## 2020-06-05 LAB
HCT VFR BLD CALC: 33.9 %
HGB BLD-MCNC: 10.6 G/DL
MCHC RBC-ENTMCNC: 26.5 PG
MCHC RBC-ENTMCNC: 31.3 G/DL
MCV RBC AUTO: 84.8 FL
PLATELET # BLD AUTO: 137 K/UL
PMV BLD: 9.2 FL
RBC # BLD: 4 M/UL
RBC # FLD: 15.8 %
WBC # FLD AUTO: 5.19 K/UL

## 2020-06-11 ENCOUNTER — APPOINTMENT (OUTPATIENT)
Dept: INFUSION THERAPY | Facility: CLINIC | Age: 75
End: 2020-06-11
Payer: MEDICARE

## 2020-06-11 ENCOUNTER — APPOINTMENT (OUTPATIENT)
Dept: HEMATOLOGY ONCOLOGY | Facility: CLINIC | Age: 75
End: 2020-06-11
Payer: MEDICARE

## 2020-06-11 ENCOUNTER — LABORATORY RESULT (OUTPATIENT)
Age: 75
End: 2020-06-11

## 2020-06-11 VITALS
BODY MASS INDEX: 40.18 KG/M2 | SYSTOLIC BLOOD PRESSURE: 167 MMHG | RESPIRATION RATE: 16 BRPM | WEIGHT: 250 LBS | HEART RATE: 66 BPM | TEMPERATURE: 98.2 F | OXYGEN SATURATION: 99 % | DIASTOLIC BLOOD PRESSURE: 68 MMHG | HEIGHT: 66 IN

## 2020-06-11 LAB
HCT VFR BLD CALC: 32 %
HGB BLD-MCNC: 10.1 G/DL
MCHC RBC-ENTMCNC: 26.4 PG
MCHC RBC-ENTMCNC: 31.6 G/DL
MCV RBC AUTO: 83.6 FL
PLATELET # BLD AUTO: 128 K/UL
PMV BLD: 9.6 FL
RBC # BLD: 3.83 M/UL
RBC # FLD: 15.2 %
WBC # FLD AUTO: 5.36 K/UL

## 2020-06-11 PROCEDURE — 99214 OFFICE O/P EST MOD 30 MIN: CPT

## 2020-06-11 RX ORDER — ERYTHROPOIETIN 10000 [IU]/ML
10000 INJECTION, SOLUTION INTRAVENOUS; SUBCUTANEOUS ONCE
Refills: 0 | Status: COMPLETED | OUTPATIENT
Start: 2020-06-11 | End: 2020-06-11

## 2020-06-11 RX ADMIN — ERYTHROPOIETIN 10000 UNIT(S): 10000 INJECTION, SOLUTION INTRAVENOUS; SUBCUTANEOUS at 15:53

## 2020-06-13 NOTE — REVIEW OF SYSTEMS
[Fatigue] : fatigue [Joint Pain] : joint pain [Constipation] : constipation [Negative] : Respiratory [Fever] : no fever [Chills] : no chills [Night Sweats] : no night sweats [Recent Change In Weight] : ~T no recent weight change [Lower Ext Edema] : no lower extremity edema [FreeTextEntry9] : generalized cramping

## 2020-06-13 NOTE — ASSESSMENT
[FreeTextEntry1] : 74 year old female presents with symptomatic normocytic anemia secondary to chronic kidney disease and iron deficiency .\par History is significant for melena and bleeding per rectum from anal fissure and CKD stage 4. S/p 6 units of PRBC from 10/2019-02/2020. \par EGD/Colonoscopy done twice - No bleeding lesions, Gastritis in antral area. \par VCE 01/2020- bleeding seen in small bowel. \par Push enteroscopy 02/2020- No bleeding seen. \par On oral iron from 01/2020- 04/01/2020. \par Procrit 10,000 thrice weekly initiated by PMD 04/06/2020. S/P 2 units PRBC on 4/10/2020\par \par Plan:\par -- Continue with Reatacrit 10,000 units will switch to every other week\par -- S/P Feraheme 510mg IV x1 dose/ Venofer 200mg IV x2 doses Ferritin 12 ng/mL Iron Sat: 6% In April 2020 \par -- will repeat Iron studies and ferritin at next office visit\par -- Advised to follow up with GI Dr. Perez for GI work up and double balloon enteroscopy\par -- Follow up with nephrologist and PMD for all other medical issues \par -- RTC in 6 weeks \par \par Patient seen and examined with Dr. Gaston.\par \par

## 2020-06-13 NOTE — HISTORY OF PRESENT ILLNESS
[de-identified] : Patient is a 74 year old white female with hypertension, chronic kidney disease, morbidly obese presents today with normocytic anemia. \par Her most recent CBC 04/01/2020 shows WBC -5.66, HB of 6.7, MCV -86.3, RDW - 17.3, platelet count- 196. Her Hb was normal until 08/2019. In October 2019 she noticed black tarry stools and was feeling tired. She went to ER and her Hb was 5.3. She was given 3 units of PRBC. Her iron studies at that showed ferritin of 8 and percent saturation of 5. She had EGD and colonoscopy which did not reveal any bleeding lesions. Following that event as per patient she was not given iron (?not sure why). She was readmitted to hospital in 01/2020 for SOB on exertion and her HB was noted to 6.0 again. She was given 2 units and her iron studies were consistent with DARRIN. B12 and folate were normal. Immunofixation showed weak IgG lambda migrating para protein. Free light chain ratio 2.1. Normal calcium. \par EGD and VCE was done. EGD revealed gastritis and VCE showed bleeding in small bowel. \par She was started on oral iron and she took for about three months. \par Her latest ferritin done in feb 2020 was 24 and percent saturation was 72% and her hb improved to 8.8. She also has push enteroscopy in 02/2020 and no bleeding lesions were found. Was recommended to have repeat colonoscopy and double balloon enteroscopy if she bleeds again.\par She went for blood work again on 04/01/2020 as she was feeling weak and having SOB and her hb dropped to 6.7. She was told by PMD to start on PROCRIT 10,000 units M-W-F. She was told her iron levels are good and she can stop iron. \par \par Today she feels very tired and her SOB is worse. She denies any melena or BRBPR in last few days. She does have anal fissure and hemorrhoids and bleeds when she is constipated. She denies hematuria or post menopausal bleeding. Denies weight loss. Does not take any NSAIDs or aspirin. She has not followed up with nephrology before.\par Family history is significant for breast cancer in her mother. She is a former smoker stopped 20 years ago.\par She is uptodate with mammogram and Pap smear. \par  [de-identified] : 6/11/2020: LATRICIA ARREAGA is a 74 year old female here today for follow up visit for anemia of CKD and Retacrit injection. She denies any increased fatigue, SOB, hematochezia or unusual bleeding at this time. In addition, she continues to have issues with constipation. Ferritin 12 ng/mL Iron Saturation 6% grom April. Patient has received Feraheme x1 and venofer x2 doses. She complained of generalized cramping after venofer infusion which resolves with in a few days. CBC reviewed Hgb 10.1 Hct 32.0%\par

## 2020-06-16 ENCOUNTER — APPOINTMENT (OUTPATIENT)
Dept: HEMATOLOGY ONCOLOGY | Facility: CLINIC | Age: 75
End: 2020-06-16

## 2020-06-16 DIAGNOSIS — Z11.59 ENCOUNTER FOR SCREENING FOR OTHER VIRAL DISEASES: ICD-10-CM

## 2020-06-26 ENCOUNTER — APPOINTMENT (OUTPATIENT)
Dept: INFUSION THERAPY | Facility: CLINIC | Age: 75
End: 2020-06-26

## 2020-06-26 ENCOUNTER — LABORATORY RESULT (OUTPATIENT)
Age: 75
End: 2020-06-26

## 2020-06-26 VITALS
RESPIRATION RATE: 18 BRPM | DIASTOLIC BLOOD PRESSURE: 58 MMHG | TEMPERATURE: 98.3 F | SYSTOLIC BLOOD PRESSURE: 126 MMHG | HEART RATE: 71 BPM

## 2020-06-26 LAB
HCT VFR BLD CALC: 30.5 %
HGB BLD-MCNC: 9.6 G/DL
MCHC RBC-ENTMCNC: 25.7 PG
MCHC RBC-ENTMCNC: 31.5 G/DL
MCV RBC AUTO: 81.8 FL
PLATELET # BLD AUTO: 121 K/UL
PMV BLD: 9.7 FL
RBC # BLD: 3.73 M/UL
RBC # FLD: 14.8 %
WBC # FLD AUTO: 4.77 K/UL

## 2020-06-26 RX ORDER — ERYTHROPOIETIN 10000 [IU]/ML
10000 INJECTION, SOLUTION INTRAVENOUS; SUBCUTANEOUS ONCE
Refills: 0 | Status: COMPLETED | OUTPATIENT
Start: 2020-06-26 | End: 2020-06-26

## 2020-06-26 RX ADMIN — ERYTHROPOIETIN 10000 UNIT(S): 10000 INJECTION, SOLUTION INTRAVENOUS; SUBCUTANEOUS at 12:13

## 2020-07-09 ENCOUNTER — APPOINTMENT (OUTPATIENT)
Dept: INFUSION THERAPY | Facility: CLINIC | Age: 75
End: 2020-07-09

## 2020-07-09 ENCOUNTER — LABORATORY RESULT (OUTPATIENT)
Age: 75
End: 2020-07-09

## 2020-07-09 LAB
HCT VFR BLD CALC: 29.3 %
HGB BLD-MCNC: 9.2 G/DL
MCHC RBC-ENTMCNC: 25.6 PG
MCHC RBC-ENTMCNC: 31.4 G/DL
MCV RBC AUTO: 81.6 FL
PLATELET # BLD AUTO: 150 K/UL
PMV BLD: 10.3 FL
RBC # BLD: 3.59 M/UL
RBC # FLD: 14.8 %
WBC # FLD AUTO: 5.09 K/UL

## 2020-07-09 RX ORDER — ERYTHROPOIETIN 10000 [IU]/ML
10000 INJECTION, SOLUTION INTRAVENOUS; SUBCUTANEOUS ONCE
Refills: 0 | Status: COMPLETED | OUTPATIENT
Start: 2020-07-09 | End: 2020-07-09

## 2020-07-09 RX ADMIN — ERYTHROPOIETIN 10000 UNIT(S): 10000 INJECTION, SOLUTION INTRAVENOUS; SUBCUTANEOUS at 14:01

## 2020-07-22 ENCOUNTER — LABORATORY RESULT (OUTPATIENT)
Age: 75
End: 2020-07-22

## 2020-07-22 ENCOUNTER — APPOINTMENT (OUTPATIENT)
Dept: INFUSION THERAPY | Facility: CLINIC | Age: 75
End: 2020-07-22

## 2020-07-22 LAB
HCT VFR BLD CALC: 24.7 %
HGB BLD-MCNC: 7.5 G/DL
MCHC RBC-ENTMCNC: 24.7 PG
MCHC RBC-ENTMCNC: 30.4 G/DL
MCV RBC AUTO: 81.3 FL
PLATELET # BLD AUTO: 133 K/UL
PMV BLD: 9.3 FL
RBC # BLD: 3.04 M/UL
RBC # FLD: 14.6 %
WBC # FLD AUTO: 4.84 K/UL

## 2020-07-22 RX ORDER — ERYTHROPOIETIN 10000 [IU]/ML
10000 INJECTION, SOLUTION INTRAVENOUS; SUBCUTANEOUS ONCE
Refills: 0 | Status: COMPLETED | OUTPATIENT
Start: 2020-07-22 | End: 2020-07-22

## 2020-07-22 RX ADMIN — ERYTHROPOIETIN 10000 UNIT(S): 10000 INJECTION, SOLUTION INTRAVENOUS; SUBCUTANEOUS at 15:18

## 2020-07-23 ENCOUNTER — APPOINTMENT (OUTPATIENT)
Dept: INFUSION THERAPY | Facility: CLINIC | Age: 75
End: 2020-07-23

## 2020-07-23 ENCOUNTER — APPOINTMENT (OUTPATIENT)
Dept: HEMATOLOGY ONCOLOGY | Facility: CLINIC | Age: 75
End: 2020-07-23

## 2020-07-23 LAB
ABO + RH PNL BLD: NORMAL
BLD GP AB SCN SERPL QL: NORMAL
FERRITIN SERPL-MCNC: 9 NG/ML
IRON SATN MFR SERPL: 9 %
IRON SERPL-MCNC: 30 UG/DL
TIBC SERPL-MCNC: 346 UG/DL
UIBC SERPL-MCNC: 316 UG/DL

## 2020-07-24 ENCOUNTER — APPOINTMENT (OUTPATIENT)
Dept: INFUSION THERAPY | Facility: CLINIC | Age: 75
End: 2020-07-24

## 2020-07-24 LAB
IRON SATN MFR SERPL: 7 %
IRON SERPL-MCNC: 27 UG/DL
TIBC SERPL-MCNC: 404 UG/DL
UIBC SERPL-MCNC: 377 UG/DL

## 2020-07-24 PROCEDURE — 86077 PHYS BLOOD BANK SERV XMATCH: CPT

## 2020-07-24 RX ORDER — FERUMOXYTOL 510 MG/17ML
510 INJECTION INTRAVENOUS ONCE
Refills: 0 | Status: COMPLETED | OUTPATIENT
Start: 2020-07-24 | End: 2020-07-24

## 2020-07-24 RX ORDER — ACETAMINOPHEN 500 MG
650 TABLET ORAL ONCE
Refills: 0 | Status: COMPLETED | OUTPATIENT
Start: 2020-07-24 | End: 2020-07-24

## 2020-07-24 RX ORDER — HYDROCORTISONE 20 MG
100 TABLET ORAL ONCE
Refills: 0 | Status: COMPLETED | OUTPATIENT
Start: 2020-07-24 | End: 2020-07-24

## 2020-07-24 RX ADMIN — Medication 650 MILLIGRAM(S): at 13:22

## 2020-07-24 RX ADMIN — Medication 100 MILLIGRAM(S): at 13:23

## 2020-07-24 RX ADMIN — FERUMOXYTOL 156 MILLIGRAM(S): 510 INJECTION INTRAVENOUS at 13:23

## 2020-07-25 LAB — FERRITIN SERPL-MCNC: 9 NG/ML

## 2020-08-03 ENCOUNTER — OUTPATIENT (OUTPATIENT)
Dept: OUTPATIENT SERVICES | Facility: HOSPITAL | Age: 75
LOS: 1 days | Discharge: HOME | End: 2020-08-03

## 2020-08-03 ENCOUNTER — LABORATORY RESULT (OUTPATIENT)
Age: 75
End: 2020-08-03

## 2020-08-03 DIAGNOSIS — Z11.59 ENCOUNTER FOR SCREENING FOR OTHER VIRAL DISEASES: ICD-10-CM

## 2020-08-03 DIAGNOSIS — Z87.19 PERSONAL HISTORY OF OTHER DISEASES OF THE DIGESTIVE SYSTEM: Chronic | ICD-10-CM

## 2020-08-05 ENCOUNTER — LABORATORY RESULT (OUTPATIENT)
Age: 75
End: 2020-08-05

## 2020-08-05 ENCOUNTER — APPOINTMENT (OUTPATIENT)
Dept: INFUSION THERAPY | Facility: CLINIC | Age: 75
End: 2020-08-05

## 2020-08-05 ENCOUNTER — OUTPATIENT (OUTPATIENT)
Dept: OUTPATIENT SERVICES | Facility: HOSPITAL | Age: 75
LOS: 1 days | Discharge: HOME | End: 2020-08-05

## 2020-08-05 DIAGNOSIS — Z87.19 PERSONAL HISTORY OF OTHER DISEASES OF THE DIGESTIVE SYSTEM: Chronic | ICD-10-CM

## 2020-08-05 DIAGNOSIS — D64.9 ANEMIA, UNSPECIFIED: ICD-10-CM

## 2020-08-05 LAB
HCT VFR BLD CALC: 27.3 %
HGB BLD-MCNC: 8.4 G/DL
MCHC RBC-ENTMCNC: 26.8 PG
MCHC RBC-ENTMCNC: 30.8 G/DL
MCV RBC AUTO: 86.9 FL
PLATELET # BLD AUTO: 120 K/UL
PMV BLD: 9.9 FL
RBC # BLD: 3.14 M/UL
RBC # FLD: 20.1 %
WBC # FLD AUTO: 5.2 K/UL

## 2020-08-05 RX ORDER — ERYTHROPOIETIN 10000 [IU]/ML
10000 INJECTION, SOLUTION INTRAVENOUS; SUBCUTANEOUS ONCE
Refills: 0 | Status: COMPLETED | OUTPATIENT
Start: 2020-08-05 | End: 2020-08-05

## 2020-08-05 RX ADMIN — ERYTHROPOIETIN 10000 UNIT(S): 10000 INJECTION, SOLUTION INTRAVENOUS; SUBCUTANEOUS at 12:42

## 2020-08-06 NOTE — ED ADULT NURSE NOTE - SUICIDE SCREENING QUESTION 3
Abdomen , soft, nontender, nondistended , no guarding or rigidity , no masses palpable , normal bowel sounds , Liver and Spleen , no hepatomegaly present , no hepatosplenomegaly , liver nontender , spleen not palpable
No

## 2020-08-20 ENCOUNTER — LABORATORY RESULT (OUTPATIENT)
Age: 75
End: 2020-08-20

## 2020-08-20 ENCOUNTER — APPOINTMENT (OUTPATIENT)
Dept: INFUSION THERAPY | Facility: CLINIC | Age: 75
End: 2020-08-20
Payer: MEDICARE

## 2020-08-20 ENCOUNTER — APPOINTMENT (OUTPATIENT)
Dept: HEMATOLOGY ONCOLOGY | Facility: CLINIC | Age: 75
End: 2020-08-20
Payer: MEDICARE

## 2020-08-20 VITALS
OXYGEN SATURATION: 99 % | RESPIRATION RATE: 18 BRPM | TEMPERATURE: 96.2 F | SYSTOLIC BLOOD PRESSURE: 138 MMHG | HEIGHT: 66 IN | BODY MASS INDEX: 39.86 KG/M2 | WEIGHT: 248 LBS | DIASTOLIC BLOOD PRESSURE: 65 MMHG | HEART RATE: 80 BPM

## 2020-08-20 VITALS
DIASTOLIC BLOOD PRESSURE: 62 MMHG | RESPIRATION RATE: 18 BRPM | SYSTOLIC BLOOD PRESSURE: 138 MMHG | TEMPERATURE: 98.6 F | HEART RATE: 67 BPM

## 2020-08-20 LAB
HCT VFR BLD CALC: 24.4 %
HGB BLD-MCNC: 7.5 G/DL
MCHC RBC-ENTMCNC: 26.1 PG
MCHC RBC-ENTMCNC: 30.7 G/DL
MCV RBC AUTO: 85 FL
PLATELET # BLD AUTO: 118 K/UL
PMV BLD: 10.2 FL
RBC # BLD: 2.87 M/UL
RBC # FLD: 17.9 %
WBC # FLD AUTO: 4.57 K/UL

## 2020-08-20 PROCEDURE — 99214 OFFICE O/P EST MOD 30 MIN: CPT

## 2020-08-20 RX ORDER — ERYTHROPOIETIN 10000 [IU]/ML
10000 INJECTION, SOLUTION INTRAVENOUS; SUBCUTANEOUS ONCE
Refills: 0 | Status: COMPLETED | OUTPATIENT
Start: 2020-08-20 | End: 2020-08-20

## 2020-08-20 RX ORDER — IRON SUCROSE 20 MG/ML
200 INJECTION, SOLUTION INTRAVENOUS ONCE
Refills: 0 | Status: COMPLETED | OUTPATIENT
Start: 2020-08-20 | End: 2020-08-20

## 2020-08-20 RX ADMIN — ERYTHROPOIETIN 10000 UNIT(S): 10000 INJECTION, SOLUTION INTRAVENOUS; SUBCUTANEOUS at 15:08

## 2020-08-20 RX ADMIN — IRON SUCROSE 200 MILLIGRAM(S): 20 INJECTION, SOLUTION INTRAVENOUS at 15:40

## 2020-08-20 RX ADMIN — IRON SUCROSE 146.67 MILLIGRAM(S): 20 INJECTION, SOLUTION INTRAVENOUS at 15:08

## 2020-08-20 NOTE — PHYSICAL EXAM
[Ambulatory and capable of all self care but unable to carry out any work activities] : Status 2- Ambulatory and capable of all self care but unable to carry out any work activities. Up and about more than 50% of waking hours [Obese] : obese [Normal] : no peripheral adenopathy appreciated

## 2020-08-21 ENCOUNTER — APPOINTMENT (OUTPATIENT)
Dept: INFUSION THERAPY | Facility: CLINIC | Age: 75
End: 2020-08-21
Payer: MEDICARE

## 2020-08-21 LAB
ABO + RH PNL BLD: NORMAL
BLD GP AB SCN SERPL QL: NORMAL

## 2020-08-21 PROCEDURE — 86077 PHYS BLOOD BANK SERV XMATCH: CPT

## 2020-08-24 NOTE — CONSULT LETTER
[Dear  ___] : Dear  [unfilled], [Consult Letter:] : I had the pleasure of evaluating your patient, [unfilled]. [DrHerman  ___] : Dr. MOORE

## 2020-08-24 NOTE — REVIEW OF SYSTEMS
[Fatigue] : fatigue [Joint Pain] : joint pain [Constipation] : constipation [Negative] : Allergic/Immunologic [SOB on Exertion] : shortness of breath during exertion [Chills] : no chills [Fever] : no fever [Recent Change In Weight] : ~T no recent weight change [Night Sweats] : no night sweats [Lower Ext Edema] : no lower extremity edema [FreeTextEntry9] : generalized cramping

## 2020-08-24 NOTE — HISTORY OF PRESENT ILLNESS
[de-identified] : Patient is a 74 year old white female with hypertension, chronic kidney disease, morbidly obese presents today with normocytic anemia. \par Her most recent CBC 04/01/2020 shows WBC -5.66, HB of 6.7, MCV -86.3, RDW - 17.3, platelet count- 196. Her Hb was normal until 08/2019. In October 2019 she noticed black tarry stools and was feeling tired. She went to ER and her Hb was 5.3. She was given 3 units of PRBC. Her iron studies at that showed ferritin of 8 and percent saturation of 5. She had EGD and colonoscopy which did not reveal any bleeding lesions. Following that event as per patient she was not given iron (?not sure why). She was readmitted to hospital in 01/2020 for SOB on exertion and her HB was noted to 6.0 again. She was given 2 units and her iron studies were consistent with DARRIN. B12 and folate were normal. Immunofixation showed weak IgG lambda migrating para protein. Free light chain ratio 2.1. Normal calcium. \par EGD and VCE was done. EGD revealed gastritis and VCE showed bleeding in small bowel. \par She was started on oral iron and she took for about three months. \par Her latest ferritin done in feb 2020 was 24 and percent saturation was 72% and her hb improved to 8.8. She also has push enteroscopy in 02/2020 and no bleeding lesions were found. Was recommended to have repeat colonoscopy and double balloon enteroscopy if she bleeds again.\par She went for blood work again on 04/01/2020 as she was feeling weak and having SOB and her hb dropped to 6.7. She was told by PMD to start on PROCRIT 10,000 units M-W-F. She was told her iron levels are good and she can stop iron. \par \par Today she feels very tired and her SOB is worse. She denies any melena or BRBPR in last few days. She does have anal fissure and hemorrhoids and bleeds when she is constipated. She denies hematuria or post menopausal bleeding. Denies weight loss. Does not take any NSAIDs or aspirin. She has not followed up with nephrology before.\par Family history is significant for breast cancer in her mother. She is a former smoker stopped 20 years ago.\par She is uptodate with mammogram and Pap smear. \par  [de-identified] : 6/11/2020: LATRICIA ARREAGA is a 74 year old female here today for follow up visit for anemia of CKD and Retacrit injection. She denies any increased fatigue, SOB, hematochezia or unusual bleeding at this time. In addition, she continues to have issues with constipation. Ferritin 12 ng/mL Iron Saturation 6% grom April. Patient has received Feraheme x1 and venofer x2 doses. She complained of generalized cramping after venofer infusion which resolves with in a few days. CBC reviewed Hgb 10.1 Hct 32.0%\par \par 8/20/2020: LATRICIA ARREAGA is a 74 year old female here today for follow up visit for anemia of CKD and Retacrit injection. She continues on Retacrit 10,000 units every other week. Ferritin 12 ng/mL Iron Saturation 6% from April. Patient has received Feraheme x1 and venofer x2 doses. Repeat iron studies from July 24 2020 showed Ferritin: 9 ng/mL and Iron saturation of 7%. She received 1 dose of Feraheme and developed nausea. Repeat CBC today shows Hgb: of 7.5 Hct: 24.4%. She denies any active bleeding or hematochezia at this time. She states that she has been having issues with hemorrhoids which may be the reason for anemia. She states that she has been feeling increasing fatigued and SOB on exertion. She is not currently on any anticoagulation.\par

## 2020-08-24 NOTE — ASSESSMENT
[FreeTextEntry1] : 74 year old female presents with symptomatic normocytic anemia secondary to chronic kidney disease and iron deficiency .\par History is significant for melena and bleeding per rectum from anal fissure and CKD stage 4. S/p 6 units of PRBC from 10/2019-02/2020. \par EGD/Colonoscopy done twice - No bleeding lesions, Gastritis in antral area. \par VCE 01/2020- bleeding seen in small bowel. \par Push enteroscopy 02/2020- No bleeding seen. \par On oral iron from 01/2020- 04/01/2020. \par Procrit 10,000 thrice weekly initiated by PMD 04/06/2020. S/P 2 units PRBC on 4/10/2020\par \par Plan:\par -- CBC reviewed Hgb , down Hgb: 7.5; patient symptomatic \par -- Schedule for 1 unit of PRBC on 8/21/2020\par -- We will change to Retacrit 10,000 units weekly\par -- S/P Feraheme 510mg IV x1 dose/ Venofer 200mg IV x2 doses Ferritin 12 ng/mL Iron Sat: 6% In April 2020 \par -- Recieved additional dose of Feraheme in July 2020 and developed severe nausea- Last Ferritin was 9 ng/mL and Iron saturation was 7%- We will switch IV iron to Venofer 200mg IV x 5 doses \par -- Advised to follow up with GI Dr. Perez for GI work up and double balloon enteroscopy\par -- Follow up with nephrologist and PMD for all other medical issues \par -- RTC in 4 weeks \par \par Patient seen and examined with Dr. Gaston.\par \par

## 2020-08-27 ENCOUNTER — LABORATORY RESULT (OUTPATIENT)
Age: 75
End: 2020-08-27

## 2020-08-27 ENCOUNTER — APPOINTMENT (OUTPATIENT)
Dept: INFUSION THERAPY | Facility: CLINIC | Age: 75
End: 2020-08-27

## 2020-08-27 RX ORDER — SODIUM CHLORIDE 9 MG/ML
250 INJECTION INTRAMUSCULAR; INTRAVENOUS; SUBCUTANEOUS
Refills: 0 | Status: DISCONTINUED | OUTPATIENT
Start: 2020-08-27 | End: 2021-02-16

## 2020-08-27 RX ORDER — HYDROCORTISONE 20 MG
100 TABLET ORAL ONCE
Refills: 0 | Status: COMPLETED | OUTPATIENT
Start: 2020-08-27 | End: 2020-08-27

## 2020-08-27 RX ORDER — ACETAMINOPHEN 500 MG
650 TABLET ORAL ONCE
Refills: 0 | Status: COMPLETED | OUTPATIENT
Start: 2020-08-27 | End: 2020-08-27

## 2020-08-27 RX ORDER — IRON SUCROSE 20 MG/ML
200 INJECTION, SOLUTION INTRAVENOUS ONCE
Refills: 0 | Status: COMPLETED | OUTPATIENT
Start: 2020-08-27 | End: 2020-08-27

## 2020-08-27 RX ORDER — ERYTHROPOIETIN 10000 [IU]/ML
10000 INJECTION, SOLUTION INTRAVENOUS; SUBCUTANEOUS ONCE
Refills: 0 | Status: COMPLETED | OUTPATIENT
Start: 2020-08-27 | End: 2020-08-27

## 2020-08-27 RX ADMIN — Medication 100 MILLIGRAM(S): at 12:54

## 2020-08-27 RX ADMIN — SODIUM CHLORIDE 250 MILLILITER(S): 9 INJECTION INTRAMUSCULAR; INTRAVENOUS; SUBCUTANEOUS at 12:33

## 2020-08-27 RX ADMIN — IRON SUCROSE 110 MILLIGRAM(S): 20 INJECTION, SOLUTION INTRAVENOUS at 12:32

## 2020-08-27 RX ADMIN — Medication 650 MILLIGRAM(S): at 12:33

## 2020-08-27 RX ADMIN — Medication 650 MILLIGRAM(S): at 12:34

## 2020-08-27 RX ADMIN — ERYTHROPOIETIN 10000 UNIT(S): 10000 INJECTION, SOLUTION INTRAVENOUS; SUBCUTANEOUS at 12:32

## 2020-08-27 RX ADMIN — Medication 100 MILLIGRAM(S): at 12:32

## 2020-09-02 ENCOUNTER — APPOINTMENT (OUTPATIENT)
Dept: INFUSION THERAPY | Facility: CLINIC | Age: 75
End: 2020-09-02

## 2020-09-03 ENCOUNTER — APPOINTMENT (OUTPATIENT)
Dept: INFUSION THERAPY | Facility: CLINIC | Age: 75
End: 2020-09-03

## 2020-09-03 ENCOUNTER — LABORATORY RESULT (OUTPATIENT)
Age: 75
End: 2020-09-03

## 2020-09-03 LAB
HCT VFR BLD CALC: 28.9 %
HGB BLD-MCNC: 8.8 G/DL
MCHC RBC-ENTMCNC: 26.9 PG
MCHC RBC-ENTMCNC: 30.4 G/DL
MCV RBC AUTO: 88.4 FL
PLATELET # BLD AUTO: 126 K/UL
PMV BLD: 9.6 FL
RBC # BLD: 3.27 M/UL
RBC # FLD: 18.3 %
WBC # FLD AUTO: 4.71 K/UL

## 2020-09-03 RX ORDER — HYDROCORTISONE 20 MG
100 TABLET ORAL ONCE
Refills: 0 | Status: COMPLETED | OUTPATIENT
Start: 2020-09-03 | End: 2020-09-03

## 2020-09-03 RX ORDER — ACETAMINOPHEN 500 MG
650 TABLET ORAL ONCE
Refills: 0 | Status: COMPLETED | OUTPATIENT
Start: 2020-09-03 | End: 2020-09-03

## 2020-09-03 RX ORDER — ERYTHROPOIETIN 10000 [IU]/ML
10000 INJECTION, SOLUTION INTRAVENOUS; SUBCUTANEOUS ONCE
Refills: 0 | Status: COMPLETED | OUTPATIENT
Start: 2020-09-03 | End: 2020-09-03

## 2020-09-03 RX ORDER — IRON SUCROSE 20 MG/ML
200 INJECTION, SOLUTION INTRAVENOUS ONCE
Refills: 0 | Status: COMPLETED | OUTPATIENT
Start: 2020-09-03 | End: 2020-09-03

## 2020-09-03 RX ADMIN — Medication 100 MILLIGRAM(S): at 14:55

## 2020-09-03 RX ADMIN — Medication 650 MILLIGRAM(S): at 14:36

## 2020-09-03 RX ADMIN — Medication 100 MILLIGRAM(S): at 14:33

## 2020-09-03 RX ADMIN — IRON SUCROSE 200 MILLIGRAM(S): 20 INJECTION, SOLUTION INTRAVENOUS at 15:36

## 2020-09-03 RX ADMIN — IRON SUCROSE 220 MILLIGRAM(S): 20 INJECTION, SOLUTION INTRAVENOUS at 14:55

## 2020-09-03 RX ADMIN — ERYTHROPOIETIN 10000 UNIT(S): 10000 INJECTION, SOLUTION INTRAVENOUS; SUBCUTANEOUS at 14:36

## 2020-09-03 RX ADMIN — Medication 650 MILLIGRAM(S): at 14:35

## 2020-09-10 ENCOUNTER — LABORATORY RESULT (OUTPATIENT)
Age: 75
End: 2020-09-10

## 2020-09-10 ENCOUNTER — APPOINTMENT (OUTPATIENT)
Dept: INFUSION THERAPY | Facility: CLINIC | Age: 75
End: 2020-09-10

## 2020-09-10 LAB
HCT VFR BLD CALC: 31.2 %
HGB BLD-MCNC: 9.5 G/DL
MCHC RBC-ENTMCNC: 26.8 PG
MCHC RBC-ENTMCNC: 30.4 G/DL
MCV RBC AUTO: 87.9 FL
PLATELET # BLD AUTO: 119 K/UL
PMV BLD: 9.3 FL
RBC # BLD: 3.55 M/UL
RBC # FLD: 18.7 %
WBC # FLD AUTO: 4.98 K/UL

## 2020-09-10 RX ORDER — ERYTHROPOIETIN 10000 [IU]/ML
10000 INJECTION, SOLUTION INTRAVENOUS; SUBCUTANEOUS ONCE
Refills: 0 | Status: COMPLETED | OUTPATIENT
Start: 2020-09-10 | End: 2020-09-10

## 2020-09-10 RX ORDER — HYDROCORTISONE 20 MG
100 TABLET ORAL ONCE
Refills: 0 | Status: COMPLETED | OUTPATIENT
Start: 2020-09-10 | End: 2020-09-10

## 2020-09-10 RX ORDER — ACETAMINOPHEN 500 MG
650 TABLET ORAL ONCE
Refills: 0 | Status: COMPLETED | OUTPATIENT
Start: 2020-09-10 | End: 2020-09-10

## 2020-09-10 RX ORDER — IRON SUCROSE 20 MG/ML
200 INJECTION, SOLUTION INTRAVENOUS ONCE
Refills: 0 | Status: COMPLETED | OUTPATIENT
Start: 2020-09-10 | End: 2020-09-10

## 2020-09-10 RX ADMIN — Medication 100 MILLIGRAM(S): at 13:01

## 2020-09-10 RX ADMIN — Medication 650 MILLIGRAM(S): at 13:01

## 2020-09-10 RX ADMIN — IRON SUCROSE 220 MILLIGRAM(S): 20 INJECTION, SOLUTION INTRAVENOUS at 13:01

## 2020-09-10 RX ADMIN — ERYTHROPOIETIN 10000 UNIT(S): 10000 INJECTION, SOLUTION INTRAVENOUS; SUBCUTANEOUS at 13:01

## 2020-09-17 ENCOUNTER — APPOINTMENT (OUTPATIENT)
Dept: HEMATOLOGY ONCOLOGY | Facility: CLINIC | Age: 75
End: 2020-09-17
Payer: MEDICARE

## 2020-09-17 ENCOUNTER — APPOINTMENT (OUTPATIENT)
Dept: INFUSION THERAPY | Facility: CLINIC | Age: 75
End: 2020-09-17
Payer: MEDICARE

## 2020-09-17 ENCOUNTER — LABORATORY RESULT (OUTPATIENT)
Age: 75
End: 2020-09-17

## 2020-09-17 VITALS
TEMPERATURE: 98.5 F | HEART RATE: 72 BPM | DIASTOLIC BLOOD PRESSURE: 66 MMHG | RESPIRATION RATE: 16 BRPM | HEIGHT: 66 IN | SYSTOLIC BLOOD PRESSURE: 128 MMHG

## 2020-09-17 LAB
HCT VFR BLD CALC: 32.1 %
HGB BLD-MCNC: 10 G/DL
MCHC RBC-ENTMCNC: 27.6 PG
MCHC RBC-ENTMCNC: 31.2 G/DL
MCV RBC AUTO: 88.7 FL
PLATELET # BLD AUTO: 115 K/UL
PMV BLD: 9.5 FL
RBC # BLD: 3.62 M/UL
RBC # FLD: 18.5 %
WBC # FLD AUTO: 4.94 K/UL

## 2020-09-17 PROCEDURE — 99213 OFFICE O/P EST LOW 20 MIN: CPT

## 2020-09-17 RX ORDER — ERYTHROPOIETIN 10000 [IU]/ML
10000 INJECTION, SOLUTION INTRAVENOUS; SUBCUTANEOUS ONCE
Refills: 0 | Status: COMPLETED | OUTPATIENT
Start: 2020-09-17 | End: 2020-09-17

## 2020-09-17 RX ADMIN — ERYTHROPOIETIN 10000 UNIT(S): 10000 INJECTION, SOLUTION INTRAVENOUS; SUBCUTANEOUS at 15:59

## 2020-09-17 NOTE — ASSESSMENT
[FreeTextEntry1] : 74 year old female presents with symptomatic normocytic anemia secondary to chronic kidney disease and iron deficiency , responding to EPO and venofer . \par Plan : continue same , follow up in 4 weeks . \par

## 2020-09-17 NOTE — HISTORY OF PRESENT ILLNESS
[de-identified] : \par Patient is a 74 year old white female with hypertension, chronic kidney disease, morbidly obese presents today with normocytic anemia. \par Her most recent CBC 04/01/2020 shows WBC -5.66, HB of 6.7, MCV -86.3, RDW - 17.3, platelet count- 196. Her Hb was normal until 08/2019. In October 2019 she noticed black tarry stools and was feeling tired. She went to ER and her Hb was 5.3. She was given 3 units of PRBC. Her iron studies at that showed ferritin of 8 and percent saturation of 5. She had EGD and colonoscopy which did not reveal any bleeding lesions. Following that event as per patient she was not given iron (?not sure why). She was readmitted to hospital in 01/2020 for SOB on exertion and her HB was noted to 6.0 again. She was given 2 units and her iron studies were consistent with DARRIN. B12 and folate were normal. Immunofixation showed weak IgG lambda migrating para protein. Free light chain ratio 2.1. Normal calcium. \par EGD and VCE was done. EGD revealed gastritis and VCE showed bleeding in small bowel. \par She was started on oral iron and she took for about three months. \par Her latest ferritin done in feb 2020 was 24 and percent saturation was 72% and her hb improved to 8.8. She also has push enteroscopy in 02/2020 and no bleeding lesions were found. Was recommended to have repeat colonoscopy and double balloon enteroscopy if she bleeds again.\par She went for blood work again on 04/01/2020 as she was feeling weak and having SOB and her hb dropped to 6.7. She was told by PMD to start on PROCRIT 10,000 units M-W-F. She was told her iron levels are good and she can stop iron. \par \par Today she feels very tired and her SOB is worse. She denies any melena or BRBPR in last few days. She does have anal fissure and hemorrhoids and bleeds when she is constipated. She denies hematuria or post menopausal bleeding. Denies weight loss. Does not take any NSAIDs or aspirin. She has not followed up with nephrology before.\par Family history is significant for breast cancer in her mother. She is a former smoker stopped 20 years ago.\par She is uptodate with mammogram and Pap smear. \par  \par \par  [de-identified] : 09/17/2020 Patient returns for follow up , she feels better after venofer X 4 and on EPO 10,000 weekly , Hb is up to 10 .

## 2020-09-25 ENCOUNTER — APPOINTMENT (OUTPATIENT)
Dept: INFUSION THERAPY | Facility: CLINIC | Age: 75
End: 2020-09-25

## 2020-09-25 ENCOUNTER — LABORATORY RESULT (OUTPATIENT)
Age: 75
End: 2020-09-25

## 2020-09-25 LAB
HCT VFR BLD CALC: 32.5 %
HGB BLD-MCNC: 9.9 G/DL
MCHC RBC-ENTMCNC: 26.8 PG
MCHC RBC-ENTMCNC: 30.5 G/DL
MCV RBC AUTO: 87.8 FL
PLATELET # BLD AUTO: 121 K/UL
PMV BLD: 10 FL
RBC # BLD: 3.7 M/UL
RBC # FLD: 17.3 %
WBC # FLD AUTO: 3.88 K/UL

## 2020-09-25 RX ORDER — ERYTHROPOIETIN 10000 [IU]/ML
10000 INJECTION, SOLUTION INTRAVENOUS; SUBCUTANEOUS ONCE
Refills: 0 | Status: COMPLETED | OUTPATIENT
Start: 2020-09-25 | End: 2020-09-25

## 2020-09-25 RX ORDER — IRON SUCROSE 20 MG/ML
200 INJECTION, SOLUTION INTRAVENOUS ONCE
Refills: 0 | Status: COMPLETED | OUTPATIENT
Start: 2020-09-25 | End: 2020-09-25

## 2020-09-25 RX ORDER — HYDROCORTISONE 20 MG
100 TABLET ORAL ONCE
Refills: 0 | Status: COMPLETED | OUTPATIENT
Start: 2020-09-25 | End: 2020-09-25

## 2020-09-25 RX ORDER — ACETAMINOPHEN 500 MG
650 TABLET ORAL ONCE
Refills: 0 | Status: COMPLETED | OUTPATIENT
Start: 2020-09-25 | End: 2020-09-25

## 2020-09-25 RX ADMIN — Medication 650 MILLIGRAM(S): at 16:24

## 2020-09-25 RX ADMIN — ERYTHROPOIETIN 10000 UNIT(S): 10000 INJECTION, SOLUTION INTRAVENOUS; SUBCUTANEOUS at 16:23

## 2020-09-25 RX ADMIN — Medication 100 MILLIGRAM(S): at 16:25

## 2020-09-25 RX ADMIN — IRON SUCROSE 200 MILLIGRAM(S): 20 INJECTION, SOLUTION INTRAVENOUS at 17:20

## 2020-09-25 RX ADMIN — IRON SUCROSE 220 MILLIGRAM(S): 20 INJECTION, SOLUTION INTRAVENOUS at 16:50

## 2020-09-25 RX ADMIN — Medication 100 MILLIGRAM(S): at 16:45

## 2020-10-01 ENCOUNTER — LABORATORY RESULT (OUTPATIENT)
Age: 75
End: 2020-10-01

## 2020-10-01 ENCOUNTER — APPOINTMENT (OUTPATIENT)
Dept: INFUSION THERAPY | Facility: CLINIC | Age: 75
End: 2020-10-01

## 2020-10-01 LAB
HCT VFR BLD CALC: 34.1 %
HGB BLD-MCNC: 10.8 G/DL
MCHC RBC-ENTMCNC: 27.5 PG
MCHC RBC-ENTMCNC: 31.7 G/DL
MCV RBC AUTO: 86.8 FL
PLATELET # BLD AUTO: 121 K/UL
PMV BLD: 9.7 FL
RBC # BLD: 3.93 M/UL
RBC # FLD: 17.1 %
WBC # FLD AUTO: 4.61 K/UL

## 2020-10-09 ENCOUNTER — APPOINTMENT (OUTPATIENT)
Dept: INFUSION THERAPY | Facility: CLINIC | Age: 75
End: 2020-10-09

## 2020-10-09 ENCOUNTER — LABORATORY RESULT (OUTPATIENT)
Age: 75
End: 2020-10-09

## 2020-10-09 LAB
HCT VFR BLD CALC: 34.1 %
HGB BLD-MCNC: 11 G/DL
MCHC RBC-ENTMCNC: 27.8 PG
MCHC RBC-ENTMCNC: 32.3 G/DL
MCV RBC AUTO: 86.3 FL
PLATELET # BLD AUTO: 111 K/UL
PMV BLD: 9.7 FL
RBC # BLD: 3.95 M/UL
RBC # FLD: 15.9 %
WBC # FLD AUTO: 4.44 K/UL

## 2020-10-15 ENCOUNTER — APPOINTMENT (OUTPATIENT)
Dept: HEMATOLOGY ONCOLOGY | Facility: CLINIC | Age: 75
End: 2020-10-15
Payer: MEDICARE

## 2020-10-15 ENCOUNTER — APPOINTMENT (OUTPATIENT)
Dept: INFUSION THERAPY | Facility: CLINIC | Age: 75
End: 2020-10-15
Payer: MEDICARE

## 2020-10-15 ENCOUNTER — LABORATORY RESULT (OUTPATIENT)
Age: 75
End: 2020-10-15

## 2020-10-15 VITALS
BODY MASS INDEX: 40.18 KG/M2 | WEIGHT: 250 LBS | SYSTOLIC BLOOD PRESSURE: 140 MMHG | RESPIRATION RATE: 18 BRPM | DIASTOLIC BLOOD PRESSURE: 66 MMHG | TEMPERATURE: 98.9 F | HEIGHT: 66 IN | HEART RATE: 62 BPM

## 2020-10-15 PROCEDURE — 99213 OFFICE O/P EST LOW 20 MIN: CPT

## 2020-10-15 RX ORDER — ERYTHROPOIETIN 10000 [IU]/ML
10000 INJECTION, SOLUTION INTRAVENOUS; SUBCUTANEOUS ONCE
Refills: 0 | Status: COMPLETED | OUTPATIENT
Start: 2020-10-15 | End: 2020-10-15

## 2020-10-15 RX ADMIN — ERYTHROPOIETIN 10000 UNIT(S): 10000 INJECTION, SOLUTION INTRAVENOUS; SUBCUTANEOUS at 16:49

## 2020-10-15 NOTE — ASSESSMENT
[FreeTextEntry1] : 74 year old female presents with symptomatic normocytic anemia secondary to chronic kidney disease and iron deficiency , responding to EPO and venofer . \par Plan : change EPO to 10,000 every 2 weeks . \par

## 2020-10-15 NOTE — HISTORY OF PRESENT ILLNESS
[de-identified] : \par Patient is a 74 year old white female with hypertension, chronic kidney disease, morbidly obese presents today with normocytic anemia. \par Her most recent CBC 04/01/2020 shows WBC -5.66, HB of 6.7, MCV -86.3, RDW - 17.3, platelet count- 196. Her Hb was normal until 08/2019. In October 2019 she noticed black tarry stools and was feeling tired. She went to ER and her Hb was 5.3. She was given 3 units of PRBC. Her iron studies at that showed ferritin of 8 and percent saturation of 5. She had EGD and colonoscopy which did not reveal any bleeding lesions. Following that event as per patient she was not given iron (?not sure why). She was readmitted to hospital in 01/2020 for SOB on exertion and her HB was noted to 6.0 again. She was given 2 units and her iron studies were consistent with DARRIN. B12 and folate were normal. Immunofixation showed weak IgG lambda migrating para protein. Free light chain ratio 2.1. Normal calcium. \par EGD and VCE was done. EGD revealed gastritis and VCE showed bleeding in small bowel. \par She was started on oral iron and she took for about three months. \par Her latest ferritin done in feb 2020 was 24 and percent saturation was 72% and her hb improved to 8.8. She also has push enteroscopy in 02/2020 and no bleeding lesions were found. Was recommended to have repeat colonoscopy and double balloon enteroscopy if she bleeds again.\par She went for blood work again on 04/01/2020 as she was feeling weak and having SOB and her hb dropped to 6.7. She was told by PMD to start on PROCRIT 10,000 units M-W-F. She was told her iron levels are good and she can stop iron. \par \par Today she feels very tired and her SOB is worse. She denies any melena or BRBPR in last few days. She does have anal fissure and hemorrhoids and bleeds when she is constipated. She denies hematuria or post menopausal bleeding. Denies weight loss. Does not take any NSAIDs or aspirin. She has not followed up with nephrology before.\par Family history is significant for breast cancer in her mother. She is a former smoker stopped 20 years ago.\par She is uptodate with mammogram and Pap smear. \par  \par \par  [de-identified] : 09/17/2020 Patient returns for follow up , she feels better after venofer X 4 and on EPO 10,000 weekly , Hb is up to 10 . \par \par 10/15/2020 Patient returns for follow up 2 weeks after last dose of EPO , today's Hb is 10.7 . she offers no new complaints .

## 2020-10-16 LAB
FERRITIN SERPL-MCNC: 59 NG/ML
HCT VFR BLD CALC: 34.2 %
HGB BLD-MCNC: 10.7 G/DL
MCHC RBC-ENTMCNC: 27 PG
MCHC RBC-ENTMCNC: 31.3 G/DL
MCV RBC AUTO: 86.4 FL
PLATELET # BLD AUTO: 109 K/UL
PMV BLD: 9.2 FL
RBC # BLD: 3.96 M/UL
RBC # FLD: 15.7 %
WBC # FLD AUTO: 3.66 K/UL

## 2020-10-22 ENCOUNTER — APPOINTMENT (OUTPATIENT)
Dept: INFUSION THERAPY | Facility: CLINIC | Age: 75
End: 2020-10-22

## 2020-10-30 ENCOUNTER — APPOINTMENT (OUTPATIENT)
Dept: INFUSION THERAPY | Facility: CLINIC | Age: 75
End: 2020-10-30

## 2020-10-30 ENCOUNTER — LABORATORY RESULT (OUTPATIENT)
Age: 75
End: 2020-10-30

## 2020-10-30 RX ORDER — ERYTHROPOIETIN 10000 [IU]/ML
10000 INJECTION, SOLUTION INTRAVENOUS; SUBCUTANEOUS ONCE
Refills: 0 | Status: COMPLETED | OUTPATIENT
Start: 2020-10-30 | End: 2020-10-30

## 2020-10-30 RX ORDER — ACETAMINOPHEN 500 MG
650 TABLET ORAL ONCE
Refills: 0 | Status: COMPLETED | OUTPATIENT
Start: 2020-10-30 | End: 2020-10-30

## 2020-10-30 RX ORDER — IRON SUCROSE 20 MG/ML
200 INJECTION, SOLUTION INTRAVENOUS ONCE
Refills: 0 | Status: COMPLETED | OUTPATIENT
Start: 2020-10-30 | End: 2020-10-30

## 2020-10-30 RX ORDER — HYDROCORTISONE 20 MG
100 TABLET ORAL ONCE
Refills: 0 | Status: COMPLETED | OUTPATIENT
Start: 2020-10-30 | End: 2020-10-30

## 2020-10-30 RX ADMIN — IRON SUCROSE 200 MILLIGRAM(S): 20 INJECTION, SOLUTION INTRAVENOUS at 16:30

## 2020-10-30 RX ADMIN — ERYTHROPOIETIN 10000 UNIT(S): 10000 INJECTION, SOLUTION INTRAVENOUS; SUBCUTANEOUS at 15:38

## 2020-10-30 RX ADMIN — Medication 650 MILLIGRAM(S): at 15:39

## 2020-10-30 RX ADMIN — Medication 100 MILLIGRAM(S): at 16:00

## 2020-10-30 RX ADMIN — IRON SUCROSE 220 MILLIGRAM(S): 20 INJECTION, SOLUTION INTRAVENOUS at 16:00

## 2020-10-30 RX ADMIN — Medication 100 MILLIGRAM(S): at 15:38

## 2020-10-30 RX ADMIN — Medication 650 MILLIGRAM(S): at 15:38

## 2020-10-31 LAB
HCT VFR BLD CALC: 32.2 %
HGB BLD-MCNC: 10.3 G/DL
MCHC RBC-ENTMCNC: 27.3 PG
MCHC RBC-ENTMCNC: 32 G/DL
MCV RBC AUTO: 85.4 FL
PLATELET # BLD AUTO: 118 K/UL
PMV BLD: 10.4 FL
RBC # BLD: 3.77 M/UL
RBC # FLD: 14.6 %
WBC # FLD AUTO: 3.96 K/UL

## 2020-11-12 ENCOUNTER — APPOINTMENT (OUTPATIENT)
Dept: INFUSION THERAPY | Facility: CLINIC | Age: 75
End: 2020-11-12

## 2020-11-12 ENCOUNTER — LABORATORY RESULT (OUTPATIENT)
Age: 75
End: 2020-11-12

## 2020-11-12 ENCOUNTER — OUTPATIENT (OUTPATIENT)
Dept: OUTPATIENT SERVICES | Facility: HOSPITAL | Age: 75
LOS: 1 days | Discharge: HOME | End: 2020-11-12

## 2020-11-12 DIAGNOSIS — Z87.19 PERSONAL HISTORY OF OTHER DISEASES OF THE DIGESTIVE SYSTEM: Chronic | ICD-10-CM

## 2020-11-12 DIAGNOSIS — D64.9 ANEMIA, UNSPECIFIED: ICD-10-CM

## 2020-11-12 RX ORDER — ERYTHROPOIETIN 10000 [IU]/ML
10000 INJECTION, SOLUTION INTRAVENOUS; SUBCUTANEOUS ONCE
Refills: 0 | Status: COMPLETED | OUTPATIENT
Start: 2020-11-12 | End: 2020-11-12

## 2020-11-12 RX ADMIN — ERYTHROPOIETIN 10000 UNIT(S): 10000 INJECTION, SOLUTION INTRAVENOUS; SUBCUTANEOUS at 15:13

## 2020-11-13 LAB
HCT VFR BLD CALC: 32.6 %
HGB BLD-MCNC: 10.4 G/DL
MCHC RBC-ENTMCNC: 27 PG
MCHC RBC-ENTMCNC: 31.9 G/DL
MCV RBC AUTO: 84.7 FL
PLATELET # BLD AUTO: 125 K/UL
PMV BLD: 9.2 FL
RBC # BLD: 3.85 M/UL
RBC # FLD: 15.2 %
WBC # FLD AUTO: 4.72 K/UL

## 2020-11-24 ENCOUNTER — APPOINTMENT (OUTPATIENT)
Dept: HEMATOLOGY ONCOLOGY | Facility: CLINIC | Age: 75
End: 2020-11-24
Payer: MEDICARE

## 2020-12-01 ENCOUNTER — LABORATORY RESULT (OUTPATIENT)
Age: 75
End: 2020-12-01

## 2020-12-01 ENCOUNTER — APPOINTMENT (OUTPATIENT)
Dept: HEMATOLOGY ONCOLOGY | Facility: CLINIC | Age: 75
End: 2020-12-01
Payer: MEDICARE

## 2020-12-01 ENCOUNTER — APPOINTMENT (OUTPATIENT)
Dept: INFUSION THERAPY | Facility: CLINIC | Age: 75
End: 2020-12-01
Payer: MEDICARE

## 2020-12-01 VITALS
HEIGHT: 66 IN | WEIGHT: 250 LBS | SYSTOLIC BLOOD PRESSURE: 142 MMHG | DIASTOLIC BLOOD PRESSURE: 74 MMHG | TEMPERATURE: 97 F | HEART RATE: 76 BPM | BODY MASS INDEX: 40.18 KG/M2 | RESPIRATION RATE: 20 BRPM

## 2020-12-01 LAB
HCT VFR BLD CALC: 31.7 %
HGB BLD-MCNC: 10.2 G/DL
MCHC RBC-ENTMCNC: 26.9 PG
MCHC RBC-ENTMCNC: 32.2 G/DL
MCV RBC AUTO: 83.6 FL
PLATELET # BLD AUTO: 119 K/UL
PMV BLD: 8.8 FL
RBC # BLD: 3.79 M/UL
RBC # FLD: 14.7 %
WBC # FLD AUTO: 4.63 K/UL

## 2020-12-01 PROCEDURE — 99213 OFFICE O/P EST LOW 20 MIN: CPT

## 2020-12-01 RX ORDER — ERYTHROPOIETIN 10000 [IU]/ML
10000 INJECTION, SOLUTION INTRAVENOUS; SUBCUTANEOUS ONCE
Refills: 0 | Status: COMPLETED | OUTPATIENT
Start: 2020-12-01 | End: 2020-12-01

## 2020-12-01 RX ORDER — IRON SUCROSE 20 MG/ML
200 INJECTION, SOLUTION INTRAVENOUS ONCE
Refills: 0 | Status: COMPLETED | OUTPATIENT
Start: 2020-12-01 | End: 2020-12-01

## 2020-12-01 RX ORDER — HYDROCORTISONE 20 MG
100 TABLET ORAL ONCE
Refills: 0 | Status: COMPLETED | OUTPATIENT
Start: 2020-12-01 | End: 2020-12-01

## 2020-12-01 RX ORDER — ACETAMINOPHEN 500 MG
650 TABLET ORAL ONCE
Refills: 0 | Status: COMPLETED | OUTPATIENT
Start: 2020-12-01 | End: 2020-12-01

## 2020-12-01 RX ADMIN — Medication 100 MILLIGRAM(S): at 16:51

## 2020-12-01 RX ADMIN — Medication 650 MILLIGRAM(S): at 16:51

## 2020-12-01 RX ADMIN — ERYTHROPOIETIN 10000 UNIT(S): 10000 INJECTION, SOLUTION INTRAVENOUS; SUBCUTANEOUS at 16:33

## 2020-12-01 RX ADMIN — IRON SUCROSE 220 MILLIGRAM(S): 20 INJECTION, SOLUTION INTRAVENOUS at 16:33

## 2020-12-01 NOTE — PHYSICAL EXAM
[Restricted in physically strenuous activity but ambulatory and able to carry out work of a light or sedentary nature] : Status 1- Restricted in physically strenuous activity but ambulatory and able to carry out work of a light or sedentary nature, e.g., light house work, office work [Obese] : obese [Normal] : no peripheral adenopathy appreciated

## 2020-12-02 NOTE — HISTORY OF PRESENT ILLNESS
[de-identified] : \par Patient is a 74 year old white female with hypertension, chronic kidney disease, morbidly obese presents today with normocytic anemia. \par Her most recent CBC 04/01/2020 shows WBC -5.66, HB of 6.7, MCV -86.3, RDW - 17.3, platelet count- 196. Her Hb was normal until 08/2019. In October 2019 she noticed black tarry stools and was feeling tired. She went to ER and her Hb was 5.3. She was given 3 units of PRBC. Her iron studies at that showed ferritin of 8 and percent saturation of 5. She had EGD and colonoscopy which did not reveal any bleeding lesions. Following that event as per patient she was not given iron (?not sure why). She was readmitted to hospital in 01/2020 for SOB on exertion and her HB was noted to 6.0 again. She was given 2 units and her iron studies were consistent with DARRIN. B12 and folate were normal. Immunofixation showed weak IgG lambda migrating para protein. Free light chain ratio 2.1. Normal calcium. \par EGD and VCE was done. EGD revealed gastritis and VCE showed bleeding in small bowel. \par She was started on oral iron and she took for about three months. \par Her latest ferritin done in feb 2020 was 24 and percent saturation was 72% and her hb improved to 8.8. She also has push enteroscopy in 02/2020 and no bleeding lesions were found. Was recommended to have repeat colonoscopy and double balloon enteroscopy if she bleeds again.\par She went for blood work again on 04/01/2020 as she was feeling weak and having SOB and her hb dropped to 6.7. She was told by PMD to start on PROCRIT 10,000 units M-W-F. She was told her iron levels are good and she can stop iron. \par \par Today she feels very tired and her SOB is worse. She denies any melena or BRBPR in last few days. She does have anal fissure and hemorrhoids and bleeds when she is constipated. She denies hematuria or post menopausal bleeding. Denies weight loss. Does not take any NSAIDs or aspirin. She has not followed up with nephrology before.\par Family history is significant for breast cancer in her mother. She is a former smoker stopped 20 years ago.\par She is uptodate with mammogram and Pap smear. \par  \par \par  [de-identified] : 09/17/2020 Patient returns for follow up , she feels better after venofer X 4 and on EPO 10,000 weekly , Hb is up to 10 . \par \par 10/15/2020 Patient returns for follow up 2 weeks after last dose of EPO , today's Hb is 10.7 . she offers no new complaints . \par \par 12/1/2020: The patient is here for follow up. SHe is on EPO and venofer. She has no major complaints except for some fatigue.

## 2020-12-02 NOTE — ASSESSMENT
[FreeTextEntry1] : # Symptomatic normocytic anemia secondary to chronic kidney disease and iron deficiency \par On EPO 20889 units every 2 weeks\par On venofer , was sheduled for 2 doses, received only 1.\par \par Plan:\par - CBC reviewed, Hb 10.2. c/w with procrit\par - We will give her venofer today\par - Repeat cbc and iron studies in 4 weeks\par \par RTC in 6-8 weeks\par The patient was seen and examined by Dr Gaston who agreed with the above plan.  \par Plan : change EPO to 10,000 every 2 weeks . \par

## 2020-12-02 NOTE — ASSESSMENT
[FreeTextEntry1] : # Symptomatic normocytic anemia secondary to chronic kidney disease and iron deficiency \par On EPO 26263 units every 2 weeks\par On venofer , was sheduled for 2 doses, received only 1.\par \par Plan:\par - CBC reviewed, Hb 10.2. c/w with procrit\par - We will give her venofer today\par - Repeat cbc and iron studies in 4 weeks\par \par RTC in 6-8 weeks\par The patient was seen and examined by Dr Gaston who agreed with the above plan.  \par Plan : change EPO to 10,000 every 2 weeks . \par

## 2020-12-02 NOTE — HISTORY OF PRESENT ILLNESS
[de-identified] : \par Patient is a 74 year old white female with hypertension, chronic kidney disease, morbidly obese presents today with normocytic anemia. \par Her most recent CBC 04/01/2020 shows WBC -5.66, HB of 6.7, MCV -86.3, RDW - 17.3, platelet count- 196. Her Hb was normal until 08/2019. In October 2019 she noticed black tarry stools and was feeling tired. She went to ER and her Hb was 5.3. She was given 3 units of PRBC. Her iron studies at that showed ferritin of 8 and percent saturation of 5. She had EGD and colonoscopy which did not reveal any bleeding lesions. Following that event as per patient she was not given iron (?not sure why). She was readmitted to hospital in 01/2020 for SOB on exertion and her HB was noted to 6.0 again. She was given 2 units and her iron studies were consistent with DARRIN. B12 and folate were normal. Immunofixation showed weak IgG lambda migrating para protein. Free light chain ratio 2.1. Normal calcium. \par EGD and VCE was done. EGD revealed gastritis and VCE showed bleeding in small bowel. \par She was started on oral iron and she took for about three months. \par Her latest ferritin done in feb 2020 was 24 and percent saturation was 72% and her hb improved to 8.8. She also has push enteroscopy in 02/2020 and no bleeding lesions were found. Was recommended to have repeat colonoscopy and double balloon enteroscopy if she bleeds again.\par She went for blood work again on 04/01/2020 as she was feeling weak and having SOB and her hb dropped to 6.7. She was told by PMD to start on PROCRIT 10,000 units M-W-F. She was told her iron levels are good and she can stop iron. \par \par Today she feels very tired and her SOB is worse. She denies any melena or BRBPR in last few days. She does have anal fissure and hemorrhoids and bleeds when she is constipated. She denies hematuria or post menopausal bleeding. Denies weight loss. Does not take any NSAIDs or aspirin. She has not followed up with nephrology before.\par Family history is significant for breast cancer in her mother. She is a former smoker stopped 20 years ago.\par She is uptodate with mammogram and Pap smear. \par  \par \par  [de-identified] : 09/17/2020 Patient returns for follow up , she feels better after venofer X 4 and on EPO 10,000 weekly , Hb is up to 10 . \par \par 10/15/2020 Patient returns for follow up 2 weeks after last dose of EPO , today's Hb is 10.7 . she offers no new complaints . \par \par 12/1/2020: The patient is here for follow up. SHe is on EPO and venofer. She has no major complaints except for some fatigue.

## 2020-12-15 ENCOUNTER — LABORATORY RESULT (OUTPATIENT)
Age: 75
End: 2020-12-15

## 2020-12-15 ENCOUNTER — APPOINTMENT (OUTPATIENT)
Dept: INFUSION THERAPY | Facility: CLINIC | Age: 75
End: 2020-12-15

## 2020-12-15 LAB
HCT VFR BLD CALC: 31.6 %
HGB BLD-MCNC: 9.8 G/DL
MCHC RBC-ENTMCNC: 26.6 PG
MCHC RBC-ENTMCNC: 31 G/DL
MCV RBC AUTO: 85.6 FL
PLATELET # BLD AUTO: 113 K/UL
PMV BLD: 9.8 FL
RBC # BLD: 3.69 M/UL
RBC # FLD: 15.7 %
WBC # FLD AUTO: 5.57 K/UL

## 2020-12-15 RX ORDER — ERYTHROPOIETIN 10000 [IU]/ML
10000 INJECTION, SOLUTION INTRAVENOUS; SUBCUTANEOUS ONCE
Refills: 0 | Status: COMPLETED | OUTPATIENT
Start: 2020-12-15 | End: 2020-12-15

## 2020-12-15 RX ADMIN — ERYTHROPOIETIN 10000 UNIT(S): 10000 INJECTION, SOLUTION INTRAVENOUS; SUBCUTANEOUS at 13:51

## 2020-12-29 ENCOUNTER — APPOINTMENT (OUTPATIENT)
Dept: INFUSION THERAPY | Facility: CLINIC | Age: 75
End: 2020-12-29

## 2020-12-29 ENCOUNTER — LABORATORY RESULT (OUTPATIENT)
Age: 75
End: 2020-12-29

## 2020-12-29 RX ORDER — ERYTHROPOIETIN 10000 [IU]/ML
10000 INJECTION, SOLUTION INTRAVENOUS; SUBCUTANEOUS ONCE
Refills: 0 | Status: COMPLETED | OUTPATIENT
Start: 2020-12-29 | End: 2020-12-29

## 2020-12-29 RX ADMIN — ERYTHROPOIETIN 10000 UNIT(S): 10000 INJECTION, SOLUTION INTRAVENOUS; SUBCUTANEOUS at 14:12

## 2020-12-30 LAB
HCT VFR BLD CALC: 31.4 %
HGB BLD-MCNC: 9.7 G/DL
MCHC RBC-ENTMCNC: 26 PG
MCHC RBC-ENTMCNC: 30.9 G/DL
MCV RBC AUTO: 84.2 FL
PLATELET # BLD AUTO: 117 K/UL
PMV BLD: 10.3 FL
RBC # BLD: 3.73 M/UL
RBC # FLD: 15.1 %
WBC # FLD AUTO: 4.08 K/UL

## 2021-01-12 ENCOUNTER — APPOINTMENT (OUTPATIENT)
Dept: HEMATOLOGY ONCOLOGY | Facility: CLINIC | Age: 76
End: 2021-01-12
Payer: MEDICARE

## 2021-01-12 ENCOUNTER — APPOINTMENT (OUTPATIENT)
Dept: INFUSION THERAPY | Facility: CLINIC | Age: 76
End: 2021-01-12
Payer: MEDICARE

## 2021-01-12 ENCOUNTER — LABORATORY RESULT (OUTPATIENT)
Age: 76
End: 2021-01-12

## 2021-01-12 VITALS
BODY MASS INDEX: 40.18 KG/M2 | DIASTOLIC BLOOD PRESSURE: 57 MMHG | RESPIRATION RATE: 18 BRPM | HEART RATE: 72 BPM | TEMPERATURE: 98.6 F | WEIGHT: 250 LBS | SYSTOLIC BLOOD PRESSURE: 129 MMHG | HEIGHT: 66 IN

## 2021-01-12 LAB
HCT VFR BLD CALC: 27.3 %
HGB BLD-MCNC: 8.5 G/DL
MCHC RBC-ENTMCNC: 25.9 PG
MCHC RBC-ENTMCNC: 31.1 G/DL
MCV RBC AUTO: 83.2 FL
PLATELET # BLD AUTO: 108 K/UL
PMV BLD: 9.8 FL
RBC # BLD: 3.28 M/UL
RBC # FLD: 15.1 %
WBC # FLD AUTO: 4.84 K/UL

## 2021-01-12 PROCEDURE — 99213 OFFICE O/P EST LOW 20 MIN: CPT

## 2021-01-12 RX ORDER — ERYTHROPOIETIN 10000 [IU]/ML
10000 INJECTION, SOLUTION INTRAVENOUS; SUBCUTANEOUS ONCE
Refills: 0 | Status: COMPLETED | OUTPATIENT
Start: 2021-01-12 | End: 2021-01-12

## 2021-01-12 RX ADMIN — ERYTHROPOIETIN 10000 UNIT(S): 10000 INJECTION, SOLUTION INTRAVENOUS; SUBCUTANEOUS at 15:47

## 2021-01-13 LAB
FERRITIN SERPL-MCNC: 10 NG/ML
IRON SATN MFR SERPL: 7 %
IRON SERPL-MCNC: 24 UG/DL
TIBC SERPL-MCNC: 345 UG/DL
UIBC SERPL-MCNC: 321 UG/DL

## 2021-01-13 NOTE — PHYSICAL EXAM
[Restricted in physically strenuous activity but ambulatory and able to carry out work of a light or sedentary nature] : Status 1- Restricted in physically strenuous activity but ambulatory and able to carry out work of a light or sedentary nature, e.g., light house work, office work [Obese] : obese [Normal] : affect appropriate [de-identified] : Lumbar paraspinal tenderness increased with palpation, no midline point tenderness [de-identified] : Excoriations on B/L arms

## 2021-01-13 NOTE — HISTORY OF PRESENT ILLNESS
[de-identified] : 09/17/2020 Patient returns for follow up , she feels better after venofer X 4 and on EPO 10,000 weekly , Hb is up to 10 . \par \par 10/15/2020 Patient returns for follow up 2 weeks after last dose of EPO , today's Hb is 10.7 . she offers no new complaints . \par \par 12/1/2020: The patient is here for follow up. SHe is on EPO and venofer. She has no major complaints except for some fatigue.\par \par 1/12/2021: Patient presents for follow up of anemia secondary to CKD and iron deficiency (history of GI bleed early 2020). Hemoglobin decreased from 9.7 on 12/29/2020 to 8.5 today. Patient states that she had a feeling her hemoglobin had dropped because she noticed increased dyspnea on exertion. She also notes back and shoulder pain for about one week, which is similar to arthritic pain she has had in the past. She also has dry skin, for which she has been scratching excessively. Takes benadryl at night with relief. [de-identified] : \par Patient is a 74 year old white female with hypertension, chronic kidney disease, morbidly obese presents today with normocytic anemia. \par Her most recent CBC 04/01/2020 shows WBC -5.66, HB of 6.7, MCV -86.3, RDW - 17.3, platelet count- 196. Her Hb was normal until 08/2019. In October 2019 she noticed black tarry stools and was feeling tired. She went to ER and her Hb was 5.3. She was given 3 units of PRBC. Her iron studies at that showed ferritin of 8 and percent saturation of 5. She had EGD and colonoscopy which did not reveal any bleeding lesions. Following that event as per patient she was not given iron (?not sure why). She was readmitted to hospital in 01/2020 for SOB on exertion and her HB was noted to 6.0 again. She was given 2 units and her iron studies were consistent with DARRIN. B12 and folate were normal. Immunofixation showed weak IgG lambda migrating para protein. Free light chain ratio 2.1. Normal calcium. \par EGD and VCE was done. EGD revealed gastritis and VCE showed bleeding in small bowel. \par She was started on oral iron and she took for about three months. \par Her latest ferritin done in feb 2020 was 24 and percent saturation was 72% and her hb improved to 8.8. She also has push enteroscopy in 02/2020 and no bleeding lesions were found. Was recommended to have repeat colonoscopy and double balloon enteroscopy if she bleeds again.\par She went for blood work again on 04/01/2020 as she was feeling weak and having SOB and her hb dropped to 6.7. She was told by PMD to start on PROCRIT 10,000 units M-W-F. She was told her iron levels are good and she can stop iron. \par \par Today she feels very tired and her SOB is worse. She denies any melena or BRBPR in last few days. She does have anal fissure and hemorrhoids and bleeds when she is constipated. She denies hematuria or post menopausal bleeding. Denies weight loss. Does not take any NSAIDs or aspirin. She has not followed up with nephrology before.\par Family history is significant for breast cancer in her mother. She is a former smoker stopped 20 years ago.\par She is uptodate with mammogram and Pap smear. \par  \par \par

## 2021-01-13 NOTE — REASON FOR VISIT
Detail Level: Generalized Detail Level: Detailed Quality 224: Stage 0-Iic Melanoma: Overutilization Of Imaging Studies For Only Stage 0-Iic Melanoma: None of the following diagnostic imaging studies ordered: chest X-ray, CT, Ultrasound, MRI, PET, or nuclear medicine scans (ML) Quality 137: Melanoma: Continuity Of Care - Recall System: Patient information entered into a recall system that includes: target date for the next exam specified AND a process to follow up with patients regarding missed or unscheduled appointments Detail Level: Simple [Follow-Up Visit] : a follow-up [FreeTextEntry2] : anemia

## 2021-01-13 NOTE — ASSESSMENT
[FreeTextEntry1] : # Symptomatic normocytic anemia secondary to chronic kidney disease and iron deficiency (history of GI bleed in early 2020)\par - On retacrit 10,000 units every 2 weeks. Will give retacrit today.\par - Will check repeat iron studies with ferritin. Patient is unable to stay for venofer infusion today. Scheduled for 1/15/2021. Patient requires premedication with solu-cortef and tylenol.\par - Will likely need multiple venofer infusions, depending on results of iron studies.\par \par Patient seen and examined with Dr. Gaston, who agreed with the above plan of care.\par

## 2021-01-13 NOTE — REVIEW OF SYSTEMS
[Negative] : Heme/Lymph [FreeTextEntry5] : increased dyspnea on exertion, occurs when hemoglobin drops [FreeTextEntry9] : back and shoulder pain, similar to prior arthritic pain [de-identified] : chronic dry skin with pruritus

## 2021-01-15 ENCOUNTER — LABORATORY RESULT (OUTPATIENT)
Age: 76
End: 2021-01-15

## 2021-01-15 ENCOUNTER — APPOINTMENT (OUTPATIENT)
Dept: INFUSION THERAPY | Facility: CLINIC | Age: 76
End: 2021-01-15

## 2021-01-15 ENCOUNTER — OUTPATIENT (OUTPATIENT)
Dept: OUTPATIENT SERVICES | Facility: HOSPITAL | Age: 76
LOS: 1 days | Discharge: HOME | End: 2021-01-15

## 2021-01-15 DIAGNOSIS — Z87.19 PERSONAL HISTORY OF OTHER DISEASES OF THE DIGESTIVE SYSTEM: Chronic | ICD-10-CM

## 2021-01-15 DIAGNOSIS — Z11.59 ENCOUNTER FOR SCREENING FOR OTHER VIRAL DISEASES: ICD-10-CM

## 2021-01-15 LAB
HCT VFR BLD CALC: 25.4 %
HGB BLD-MCNC: 7.8 G/DL
MCHC RBC-ENTMCNC: 25.8 PG
MCHC RBC-ENTMCNC: 30.7 G/DL
MCV RBC AUTO: 84.1 FL
PLATELET # BLD AUTO: 106 K/UL
PMV BLD: 10.4 FL
RBC # BLD: 3.02 M/UL
RBC # FLD: 15 %
WBC # FLD AUTO: 3.53 K/UL

## 2021-01-15 RX ORDER — HYDROCORTISONE 20 MG
100 TABLET ORAL ONCE
Refills: 0 | Status: COMPLETED | OUTPATIENT
Start: 2021-01-15 | End: 2021-01-15

## 2021-01-15 RX ORDER — ACETAMINOPHEN 500 MG
650 TABLET ORAL ONCE
Refills: 0 | Status: COMPLETED | OUTPATIENT
Start: 2021-01-15 | End: 2021-01-15

## 2021-01-15 RX ORDER — IRON SUCROSE 20 MG/ML
200 INJECTION, SOLUTION INTRAVENOUS ONCE
Refills: 0 | Status: COMPLETED | OUTPATIENT
Start: 2021-01-15 | End: 2021-01-15

## 2021-01-15 RX ADMIN — Medication 100 MILLIGRAM(S): at 13:30

## 2021-01-15 RX ADMIN — Medication 650 MILLIGRAM(S): at 13:26

## 2021-01-15 RX ADMIN — Medication 100 MILLIGRAM(S): at 13:50

## 2021-01-15 RX ADMIN — IRON SUCROSE 200 MILLIGRAM(S): 20 INJECTION, SOLUTION INTRAVENOUS at 13:25

## 2021-01-15 RX ADMIN — IRON SUCROSE 220 MILLIGRAM(S): 20 INJECTION, SOLUTION INTRAVENOUS at 13:55

## 2021-01-16 LAB
ABO + RH PNL BLD: NORMAL
BLD GP AB SCN SERPL QL: NORMAL

## 2021-01-18 ENCOUNTER — APPOINTMENT (OUTPATIENT)
Dept: INFUSION THERAPY | Facility: CLINIC | Age: 76
End: 2021-01-18

## 2021-01-28 ENCOUNTER — LABORATORY RESULT (OUTPATIENT)
Age: 76
End: 2021-01-28

## 2021-01-28 ENCOUNTER — APPOINTMENT (OUTPATIENT)
Dept: INFUSION THERAPY | Facility: CLINIC | Age: 76
End: 2021-01-28

## 2021-01-28 LAB
HCT VFR BLD CALC: 29.9 %
HGB BLD-MCNC: 9.1 G/DL
MCHC RBC-ENTMCNC: 25.9 PG
MCHC RBC-ENTMCNC: 30.4 G/DL
MCV RBC AUTO: 84.9 FL
PLATELET # BLD AUTO: 117 K/UL
PMV BLD: 9.9 FL
RBC # BLD: 3.52 M/UL
RBC # FLD: 16.1 %
WBC # FLD AUTO: 5.14 K/UL

## 2021-01-28 RX ORDER — ERYTHROPOIETIN 10000 [IU]/ML
10000 INJECTION, SOLUTION INTRAVENOUS; SUBCUTANEOUS ONCE
Refills: 0 | Status: COMPLETED | OUTPATIENT
Start: 2021-01-28 | End: 2021-01-28

## 2021-01-28 RX ORDER — IRON SUCROSE 20 MG/ML
200 INJECTION, SOLUTION INTRAVENOUS ONCE
Refills: 0 | Status: COMPLETED | OUTPATIENT
Start: 2021-01-28 | End: 2021-01-28

## 2021-01-28 RX ORDER — HYDROCORTISONE 20 MG
100 TABLET ORAL ONCE
Refills: 0 | Status: COMPLETED | OUTPATIENT
Start: 2021-01-28 | End: 2021-01-28

## 2021-01-28 RX ADMIN — ERYTHROPOIETIN 10000 UNIT(S): 10000 INJECTION, SOLUTION INTRAVENOUS; SUBCUTANEOUS at 11:39

## 2021-01-28 RX ADMIN — Medication 100 MILLIGRAM(S): at 12:00

## 2021-01-28 RX ADMIN — Medication 100 MILLIGRAM(S): at 11:39

## 2021-01-28 RX ADMIN — IRON SUCROSE 200 MILLIGRAM(S): 20 INJECTION, SOLUTION INTRAVENOUS at 12:30

## 2021-01-28 RX ADMIN — IRON SUCROSE 220 MILLIGRAM(S): 20 INJECTION, SOLUTION INTRAVENOUS at 12:00

## 2021-02-04 ENCOUNTER — APPOINTMENT (OUTPATIENT)
Dept: INFUSION THERAPY | Facility: CLINIC | Age: 76
End: 2021-02-04

## 2021-02-04 RX ORDER — IRON SUCROSE 20 MG/ML
200 INJECTION, SOLUTION INTRAVENOUS ONCE
Refills: 0 | Status: COMPLETED | OUTPATIENT
Start: 2021-02-04 | End: 2021-02-04

## 2021-02-04 RX ORDER — HYDROCORTISONE 20 MG
100 TABLET ORAL ONCE
Refills: 0 | Status: COMPLETED | OUTPATIENT
Start: 2021-02-04 | End: 2021-02-04

## 2021-02-04 RX ORDER — ACETAMINOPHEN 500 MG
650 TABLET ORAL ONCE
Refills: 0 | Status: COMPLETED | OUTPATIENT
Start: 2021-02-04 | End: 2021-02-04

## 2021-02-04 RX ADMIN — IRON SUCROSE 220 MILLIGRAM(S): 20 INJECTION, SOLUTION INTRAVENOUS at 12:05

## 2021-02-04 RX ADMIN — IRON SUCROSE 200 MILLIGRAM(S): 20 INJECTION, SOLUTION INTRAVENOUS at 12:35

## 2021-02-04 RX ADMIN — Medication 100 MILLIGRAM(S): at 12:03

## 2021-02-04 RX ADMIN — Medication 100 MILLIGRAM(S): at 11:43

## 2021-02-04 RX ADMIN — Medication 650 MILLIGRAM(S): at 11:43

## 2021-02-11 ENCOUNTER — LABORATORY RESULT (OUTPATIENT)
Age: 76
End: 2021-02-11

## 2021-02-11 ENCOUNTER — APPOINTMENT (OUTPATIENT)
Dept: INFUSION THERAPY | Facility: CLINIC | Age: 76
End: 2021-02-11

## 2021-02-11 LAB
HCT VFR BLD CALC: 29.9 %
HGB BLD-MCNC: 9.3 G/DL
MCHC RBC-ENTMCNC: 27 PG
MCHC RBC-ENTMCNC: 31.1 G/DL
MCV RBC AUTO: 86.9 FL
PLATELET # BLD AUTO: 112 K/UL
PMV BLD: 9.3 FL
RBC # BLD: 3.44 M/UL
RBC # FLD: 17.9 %
WBC # FLD AUTO: 4.62 K/UL

## 2021-02-11 RX ORDER — ERYTHROPOIETIN 10000 [IU]/ML
10000 INJECTION, SOLUTION INTRAVENOUS; SUBCUTANEOUS ONCE
Refills: 0 | Status: COMPLETED | OUTPATIENT
Start: 2021-02-11 | End: 2021-02-11

## 2021-02-11 RX ORDER — IRON SUCROSE 20 MG/ML
200 INJECTION, SOLUTION INTRAVENOUS ONCE
Refills: 0 | Status: COMPLETED | OUTPATIENT
Start: 2021-02-11 | End: 2021-02-11

## 2021-02-11 RX ORDER — HYDROCORTISONE 20 MG
100 TABLET ORAL ONCE
Refills: 0 | Status: COMPLETED | OUTPATIENT
Start: 2021-02-11 | End: 2021-02-11

## 2021-02-11 RX ADMIN — Medication 100 MILLIGRAM(S): at 12:20

## 2021-02-11 RX ADMIN — IRON SUCROSE 220 MILLIGRAM(S): 20 INJECTION, SOLUTION INTRAVENOUS at 12:20

## 2021-02-11 RX ADMIN — ERYTHROPOIETIN 10000 UNIT(S): 10000 INJECTION, SOLUTION INTRAVENOUS; SUBCUTANEOUS at 11:59

## 2021-02-11 RX ADMIN — IRON SUCROSE 200 MILLIGRAM(S): 20 INJECTION, SOLUTION INTRAVENOUS at 12:50

## 2021-02-11 RX ADMIN — Medication 100 MILLIGRAM(S): at 11:59

## 2021-02-12 ENCOUNTER — APPOINTMENT (OUTPATIENT)
Dept: INFUSION THERAPY | Facility: CLINIC | Age: 76
End: 2021-02-12

## 2021-02-15 ENCOUNTER — OUTPATIENT (OUTPATIENT)
Dept: OUTPATIENT SERVICES | Facility: HOSPITAL | Age: 76
LOS: 1 days | Discharge: HOME | End: 2021-02-15

## 2021-02-15 ENCOUNTER — LABORATORY RESULT (OUTPATIENT)
Age: 76
End: 2021-02-15

## 2021-02-15 DIAGNOSIS — Z87.19 PERSONAL HISTORY OF OTHER DISEASES OF THE DIGESTIVE SYSTEM: Chronic | ICD-10-CM

## 2021-02-15 DIAGNOSIS — Z11.59 ENCOUNTER FOR SCREENING FOR OTHER VIRAL DISEASES: ICD-10-CM

## 2021-02-25 ENCOUNTER — LABORATORY RESULT (OUTPATIENT)
Age: 76
End: 2021-02-25

## 2021-02-25 ENCOUNTER — APPOINTMENT (OUTPATIENT)
Dept: INFUSION THERAPY | Facility: CLINIC | Age: 76
End: 2021-02-25

## 2021-02-25 RX ORDER — HYDROCORTISONE 20 MG
100 TABLET ORAL ONCE
Refills: 0 | Status: COMPLETED | OUTPATIENT
Start: 2021-02-25 | End: 2021-02-25

## 2021-02-25 RX ORDER — IRON SUCROSE 20 MG/ML
200 INJECTION, SOLUTION INTRAVENOUS ONCE
Refills: 0 | Status: COMPLETED | OUTPATIENT
Start: 2021-02-25 | End: 2021-02-25

## 2021-02-25 RX ORDER — ACETAMINOPHEN 500 MG
650 TABLET ORAL ONCE
Refills: 0 | Status: COMPLETED | OUTPATIENT
Start: 2021-02-25 | End: 2021-02-25

## 2021-02-25 RX ORDER — ERYTHROPOIETIN 10000 [IU]/ML
10000 INJECTION, SOLUTION INTRAVENOUS; SUBCUTANEOUS ONCE
Refills: 0 | Status: COMPLETED | OUTPATIENT
Start: 2021-02-25 | End: 2021-02-25

## 2021-02-25 RX ADMIN — Medication 650 MILLIGRAM(S): at 12:35

## 2021-02-25 RX ADMIN — ERYTHROPOIETIN 10000 UNIT(S): 10000 INJECTION, SOLUTION INTRAVENOUS; SUBCUTANEOUS at 12:35

## 2021-02-25 RX ADMIN — Medication 100 MILLIGRAM(S): at 12:35

## 2021-02-25 RX ADMIN — IRON SUCROSE 220 MILLIGRAM(S): 20 INJECTION, SOLUTION INTRAVENOUS at 12:35

## 2021-03-04 ENCOUNTER — OUTPATIENT (OUTPATIENT)
Dept: OUTPATIENT SERVICES | Facility: HOSPITAL | Age: 76
LOS: 1 days | Discharge: HOME | End: 2021-03-04

## 2021-03-04 ENCOUNTER — APPOINTMENT (OUTPATIENT)
Dept: INFUSION THERAPY | Facility: CLINIC | Age: 76
End: 2021-03-04

## 2021-03-04 DIAGNOSIS — Z87.19 PERSONAL HISTORY OF OTHER DISEASES OF THE DIGESTIVE SYSTEM: Chronic | ICD-10-CM

## 2021-03-04 DIAGNOSIS — D64.9 ANEMIA, UNSPECIFIED: ICD-10-CM

## 2021-03-04 RX ORDER — HYDROCORTISONE 20 MG
100 TABLET ORAL ONCE
Refills: 0 | Status: COMPLETED | OUTPATIENT
Start: 2021-03-04 | End: 2021-03-04

## 2021-03-04 RX ORDER — IRON SUCROSE 20 MG/ML
200 INJECTION, SOLUTION INTRAVENOUS ONCE
Refills: 0 | Status: COMPLETED | OUTPATIENT
Start: 2021-03-04 | End: 2021-03-04

## 2021-03-04 RX ORDER — ACETAMINOPHEN 500 MG
650 TABLET ORAL ONCE
Refills: 0 | Status: COMPLETED | OUTPATIENT
Start: 2021-03-04 | End: 2021-03-04

## 2021-03-04 RX ADMIN — Medication 650 MILLIGRAM(S): at 11:59

## 2021-03-04 RX ADMIN — Medication 100 MILLIGRAM(S): at 11:58

## 2021-03-04 RX ADMIN — Medication 100 MILLIGRAM(S): at 12:20

## 2021-03-04 RX ADMIN — IRON SUCROSE 220 MILLIGRAM(S): 20 INJECTION, SOLUTION INTRAVENOUS at 12:20

## 2021-03-04 RX ADMIN — IRON SUCROSE 200 MILLIGRAM(S): 20 INJECTION, SOLUTION INTRAVENOUS at 12:50

## 2021-03-04 RX ADMIN — Medication 650 MILLIGRAM(S): at 11:58

## 2021-03-11 ENCOUNTER — APPOINTMENT (OUTPATIENT)
Dept: HEMATOLOGY ONCOLOGY | Facility: CLINIC | Age: 76
End: 2021-03-11
Payer: MEDICARE

## 2021-03-11 ENCOUNTER — APPOINTMENT (OUTPATIENT)
Dept: INFUSION THERAPY | Facility: CLINIC | Age: 76
End: 2021-03-11
Payer: MEDICARE

## 2021-03-11 ENCOUNTER — LABORATORY RESULT (OUTPATIENT)
Age: 76
End: 2021-03-11

## 2021-03-11 VITALS
HEART RATE: 74 BPM | DIASTOLIC BLOOD PRESSURE: 67 MMHG | SYSTOLIC BLOOD PRESSURE: 188 MMHG | RESPIRATION RATE: 18 BRPM | WEIGHT: 262 LBS | HEIGHT: 66 IN | BODY MASS INDEX: 42.11 KG/M2 | TEMPERATURE: 97.1 F

## 2021-03-11 LAB
HCT VFR BLD CALC: 35 %
HGB BLD-MCNC: 10.9 G/DL
IRON SATN MFR SERPL: 25 %
IRON SERPL-MCNC: 71 UG/DL
MCHC RBC-ENTMCNC: 27.3 PG
MCHC RBC-ENTMCNC: 31.1 G/DL
MCV RBC AUTO: 87.7 FL
PLATELET # BLD AUTO: 105 K/UL
PMV BLD: 9.4 FL
RBC # BLD: 3.99 M/UL
RBC # FLD: 17.2 %
TIBC SERPL-MCNC: 282 UG/DL
UIBC SERPL-MCNC: 211 UG/DL
WBC # FLD AUTO: 4.16 K/UL

## 2021-03-11 PROCEDURE — 99214 OFFICE O/P EST MOD 30 MIN: CPT

## 2021-03-13 LAB — FERRITIN SERPL-MCNC: 158 NG/ML

## 2021-03-13 NOTE — HISTORY OF PRESENT ILLNESS
[de-identified] : 09/17/2020 Patient returns for follow up , she feels better after venofer X 4 and on EPO 10,000 weekly , Hb is up to 10 . \par \par 10/15/2020 Patient returns for follow up 2 weeks after last dose of EPO , today's Hb is 10.7 . she offers no new complaints . \par \par 12/1/2020: The patient is here for follow up. SHe is on EPO and venofer. She has no major complaints except for some fatigue.\par \par 1/12/2021: Patient presents for follow up of anemia secondary to CKD and iron deficiency (history of GI bleed early 2020). Hemoglobin decreased from 9.7 on 12/29/2020 to 8.5 today. Patient states that she had a feeling her hemoglobin had dropped because she noticed increased dyspnea on exertion. She also notes back and shoulder pain for about one week, which is similar to arthritic pain she has had in the past. She also has dry skin, for which she has been scratching excessively. Takes benadryl at night with relief.\par \par 03/11/2021: LATRICIA is here for follow up visit for anemia secondary to CKD and iron deficiency (history of GI bleed early 2020). CBC reviewed Hgb 10.9 MCV: 87.7%. Ferritin from 1/2021 was 10 ng/mL. She has received a total of 5 venofer infusions. She states that she continues to have abdominal pain and diarrhea after each venofer infusion. \par She continues on retacrit 10,000 units every 2 weeks. She states her last capsule endoscopy was about a year ago; no diagnostic abnormalities noted. In addition, she has noted that her BP has been elevated; she is currently on Lopressor 25mg daily.  [de-identified] : \par Patient is a 74 year old white female with hypertension, chronic kidney disease, morbidly obese presents today with normocytic anemia. \par Her most recent CBC 04/01/2020 shows WBC -5.66, HB of 6.7, MCV -86.3, RDW - 17.3, platelet count- 196. Her Hb was normal until 08/2019. In October 2019 she noticed black tarry stools and was feeling tired. She went to ER and her Hb was 5.3. She was given 3 units of PRBC. Her iron studies at that showed ferritin of 8 and percent saturation of 5. She had EGD and colonoscopy which did not reveal any bleeding lesions. Following that event as per patient she was not given iron (?not sure why). She was readmitted to hospital in 01/2020 for SOB on exertion and her HB was noted to 6.0 again. She was given 2 units and her iron studies were consistent with DARRIN. B12 and folate were normal. Immunofixation showed weak IgG lambda migrating para protein. Free light chain ratio 2.1. Normal calcium. \par EGD and VCE was done. EGD revealed gastritis and VCE showed bleeding in small bowel. \par She was started on oral iron and she took for about three months. \par Her latest ferritin done in feb 2020 was 24 and percent saturation was 72% and her hb improved to 8.8. She also has push enteroscopy in 02/2020 and no bleeding lesions were found. Was recommended to have repeat colonoscopy and double balloon enteroscopy if she bleeds again.\par She went for blood work again on 04/01/2020 as she was feeling weak and having SOB and her hb dropped to 6.7. She was told by PMD to start on PROCRIT 10,000 units M-W-F. She was told her iron levels are good and she can stop iron. \par \par Today she feels very tired and her SOB is worse. She denies any melena or BRBPR in last few days. She does have anal fissure and hemorrhoids and bleeds when she is constipated. She denies hematuria or post menopausal bleeding. Denies weight loss. Does not take any NSAIDs or aspirin. She has not followed up with nephrology before.\par Family history is significant for breast cancer in her mother. She is a former smoker stopped 20 years ago.\par She is uptodate with mammogram and Pap smear. \par  \par \par

## 2021-03-13 NOTE — REVIEW OF SYSTEMS
[Negative] : Heme/Lymph [FreeTextEntry5] : increased dyspnea on exertion, occurs when hemoglobin drops [FreeTextEntry9] : back and shoulder pain, similar to prior arthritic pain [de-identified] : chronic dry skin with pruritus

## 2021-03-13 NOTE — PHYSICAL EXAM
[Restricted in physically strenuous activity but ambulatory and able to carry out work of a light or sedentary nature] : Status 1- Restricted in physically strenuous activity but ambulatory and able to carry out work of a light or sedentary nature, e.g., light house work, office work [Obese] : obese [Normal] : affect appropriate [de-identified] : generalized arthralgias  [de-identified] : Excoriations on B/L arms

## 2021-03-25 ENCOUNTER — LABORATORY RESULT (OUTPATIENT)
Age: 76
End: 2021-03-25

## 2021-03-25 ENCOUNTER — APPOINTMENT (OUTPATIENT)
Dept: INFUSION THERAPY | Facility: CLINIC | Age: 76
End: 2021-03-25

## 2021-03-25 LAB
HCT VFR BLD CALC: 36 %
HGB BLD-MCNC: 11.5 G/DL
MCHC RBC-ENTMCNC: 28.1 PG
MCHC RBC-ENTMCNC: 31.9 G/DL
MCV RBC AUTO: 88 FL
PLATELET # BLD AUTO: 105 K/UL
PMV BLD: 10 FL
RBC # BLD: 4.09 M/UL
RBC # FLD: 16 %
WBC # FLD AUTO: 4.9 K/UL

## 2021-04-08 ENCOUNTER — APPOINTMENT (OUTPATIENT)
Dept: INFUSION THERAPY | Facility: CLINIC | Age: 76
End: 2021-04-08
Payer: MEDICARE

## 2021-04-08 ENCOUNTER — LABORATORY RESULT (OUTPATIENT)
Age: 76
End: 2021-04-08

## 2021-04-08 ENCOUNTER — APPOINTMENT (OUTPATIENT)
Dept: HEMATOLOGY ONCOLOGY | Facility: CLINIC | Age: 76
End: 2021-04-08
Payer: MEDICARE

## 2021-04-08 VITALS
WEIGHT: 252 LBS | RESPIRATION RATE: 18 BRPM | DIASTOLIC BLOOD PRESSURE: 63 MMHG | HEIGHT: 66 IN | TEMPERATURE: 98.7 F | SYSTOLIC BLOOD PRESSURE: 169 MMHG | HEART RATE: 63 BPM | BODY MASS INDEX: 40.5 KG/M2

## 2021-04-08 LAB
HCT VFR BLD CALC: 34.1 %
HGB BLD-MCNC: 10.8 G/DL
MCHC RBC-ENTMCNC: 27.9 PG
MCHC RBC-ENTMCNC: 31.7 G/DL
MCV RBC AUTO: 88.1 FL
PLATELET # BLD AUTO: 92 K/UL
PMV BLD: 9.6 FL
RBC # BLD: 3.87 M/UL
RBC # FLD: 15.7 %
WBC # FLD AUTO: 4.48 K/UL

## 2021-04-08 PROCEDURE — 99213 OFFICE O/P EST LOW 20 MIN: CPT

## 2021-04-08 NOTE — ASSESSMENT
[FreeTextEntry1] : 74 year old female presents with symptomatic normocytic anemia secondary to chronic kidney disease and iron deficiency , responding to EPO and venofer . \par Plan : repeat ferritin , resume EPO when Hb <10 . \par

## 2021-04-08 NOTE — PHYSICAL EXAM
[Normal] : RRR, normal S1S2, no murmurs, rubs, gallops Gen: a&ox3  Lungs: clear b/l  Abd: soft, MD, RLQ tender, mild guarding

## 2021-04-08 NOTE — HISTORY OF PRESENT ILLNESS
[de-identified] : bruce is a 74 year old white female with hypertension, chronic kidney disease, morbidly obese presents today with normocytic anemia. \par Her most recent CBC 04/01/2020 shows WBC -5.66, HB of 6.7, MCV -86.3, RDW - 17.3, platelet count- 196. Her Hb was normal until 08/2019. In October 2019 she noticed black tarry stools and was feeling tired. She went to ER and her Hb was 5.3. She was given 3 units of PRBC. Her iron studies at that showed ferritin of 8 and percent saturation of 5. She had EGD and colonoscopy which did not reveal any bleeding lesions. Following that event as per patient she was not given iron (?not sure why). She was readmitted to hospital in 01/2020 for SOB on exertion and her HB was noted to 6.0 again. She was given 2 units and her iron studies were consistent with DARRIN. B12 and folate were normal. Immunofixation showed weak IgG lambda migrating para protein. Free light chain ratio 2.1. Normal calcium. \par EGD and VCE was done. EGD revealed gastritis and VCE showed bleeding in small bowel. \par She was started on oral iron and she took for about three months. \par Her latest ferritin done in feb 2020 was 24 and percent saturation was 72% and her hb improved to 8.8. She also has push enteroscopy in 02/2020 and no bleeding lesions were found. Was recommended to have repeat colonoscopy and double balloon enteroscopy if she bleeds again.\par She went for blood work again on 04/01/2020 as she was feeling weak and having SOB and her hb dropped to 6.7. She was told by PMD to start on PROCRIT 10,000 units M-W-F. She was told her iron levels are good and she can stop iron. \par \par Today she feels very tired and her SOB is worse. She denies any melena or BRBPR in last few days. She does have anal fissure and hemorrhoids and bleeds when she is constipated. She denies hematuria or post menopausal bleeding. Denies weight loss. Does not take any NSAIDs or aspirin. She has not followed up with nephrology before.\par Family history is significant for breast cancer in her mother. She is a former smoker stopped 20 years ago.\par She is uptodate with mammogram and Pap smear. \par  [de-identified] : 09/17/2020 Patient returns for follow up , she feels better after venofer X 4 and on EPO 10,000 weekly , Hb is up to 10 . \par \par 10/15/2020 Patient returns for follow up 2 weeks after last dose of EPO , today's Hb is 10.7 . she offers no new complaints . \par \par 04/08/2021 Patient returns for follow up for anemia on EPO and iron replacement . Hb is 10.8 . she complains of mild left arm pain after she started to self check her BP . ahe meds were recently adjusted by her PMD .

## 2021-04-22 ENCOUNTER — LABORATORY RESULT (OUTPATIENT)
Age: 76
End: 2021-04-22

## 2021-04-22 ENCOUNTER — APPOINTMENT (OUTPATIENT)
Dept: INFUSION THERAPY | Facility: CLINIC | Age: 76
End: 2021-04-22

## 2021-04-22 LAB
HCT VFR BLD CALC: 29.5 %
HGB BLD-MCNC: 9.4 G/DL
MCHC RBC-ENTMCNC: 27.7 PG
MCHC RBC-ENTMCNC: 31.9 G/DL
MCV RBC AUTO: 87 FL
PLATELET # BLD AUTO: 110 K/UL
PMV BLD: 9.4 FL
RBC # BLD: 3.39 M/UL
RBC # FLD: 14.6 %
WBC # FLD AUTO: 3.51 K/UL

## 2021-04-22 RX ORDER — ERYTHROPOIETIN 10000 [IU]/ML
10000 INJECTION, SOLUTION INTRAVENOUS; SUBCUTANEOUS ONCE
Refills: 0 | Status: COMPLETED | OUTPATIENT
Start: 2021-04-22 | End: 2021-04-22

## 2021-04-22 RX ADMIN — ERYTHROPOIETIN 10000 UNIT(S): 10000 INJECTION, SOLUTION INTRAVENOUS; SUBCUTANEOUS at 13:29

## 2021-05-06 ENCOUNTER — APPOINTMENT (OUTPATIENT)
Dept: INFUSION THERAPY | Facility: CLINIC | Age: 76
End: 2021-05-06

## 2021-05-06 ENCOUNTER — LABORATORY RESULT (OUTPATIENT)
Age: 76
End: 2021-05-06

## 2021-05-06 LAB
HCT VFR BLD CALC: 26.9 %
HGB BLD-MCNC: 8.5 G/DL
MCHC RBC-ENTMCNC: 27.2 PG
MCHC RBC-ENTMCNC: 31.6 G/DL
MCV RBC AUTO: 85.9 FL
PLATELET # BLD AUTO: 121 K/UL
PMV BLD: 10 FL
RBC # BLD: 3.13 M/UL
RBC # FLD: 13.9 %
WBC # FLD AUTO: 4.68 K/UL

## 2021-05-06 RX ORDER — ERYTHROPOIETIN 10000 [IU]/ML
10000 INJECTION, SOLUTION INTRAVENOUS; SUBCUTANEOUS ONCE
Refills: 0 | Status: COMPLETED | OUTPATIENT
Start: 2021-05-06 | End: 2021-05-06

## 2021-05-06 RX ADMIN — ERYTHROPOIETIN 10000 UNIT(S): 10000 INJECTION, SOLUTION INTRAVENOUS; SUBCUTANEOUS at 13:16

## 2021-05-13 ENCOUNTER — LABORATORY RESULT (OUTPATIENT)
Age: 76
End: 2021-05-13

## 2021-05-13 ENCOUNTER — APPOINTMENT (OUTPATIENT)
Dept: HEMATOLOGY ONCOLOGY | Facility: CLINIC | Age: 76
End: 2021-05-13

## 2021-05-13 LAB
HCT VFR BLD CALC: 25.7 %
HGB BLD-MCNC: 8 G/DL
MCHC RBC-ENTMCNC: 26.9 PG
MCHC RBC-ENTMCNC: 31.1 G/DL
MCV RBC AUTO: 86.5 FL
PLATELET # BLD AUTO: 118 K/UL
PMV BLD: 10.8 FL
RBC # BLD: 2.97 M/UL
RBC # FLD: 13.9 %
WBC # FLD AUTO: 4.42 K/UL

## 2021-05-14 ENCOUNTER — APPOINTMENT (OUTPATIENT)
Dept: HEMATOLOGY ONCOLOGY | Facility: CLINIC | Age: 76
End: 2021-05-14

## 2021-05-14 ENCOUNTER — LABORATORY RESULT (OUTPATIENT)
Age: 76
End: 2021-05-14

## 2021-05-17 ENCOUNTER — APPOINTMENT (OUTPATIENT)
Dept: INFUSION THERAPY | Facility: CLINIC | Age: 76
End: 2021-05-17

## 2021-05-17 LAB
ABO + RH PNL BLD: NORMAL
BLD GP AB SCN SERPL QL: NORMAL
FERRITIN SERPL-MCNC: 10 NG/ML
HCT VFR BLD CALC: 24.9 %
HGB BLD-MCNC: 7.8 G/DL
IRON SATN MFR SERPL: 9 %
IRON SERPL-MCNC: 32 UG/DL
MCHC RBC-ENTMCNC: 26.4 PG
MCHC RBC-ENTMCNC: 31.3 G/DL
MCV RBC AUTO: 84.1 FL
PLATELET # BLD AUTO: 126 K/UL
PMV BLD: 9.3 FL
RBC # BLD: 2.96 M/UL
RBC # FLD: 13.9 %
TIBC SERPL-MCNC: 349 UG/DL
UIBC SERPL-MCNC: 317 UG/DL
WBC # FLD AUTO: 4.59 K/UL

## 2021-05-17 RX ORDER — HYDROCORTISONE 20 MG
100 TABLET ORAL ONCE
Refills: 0 | Status: COMPLETED | OUTPATIENT
Start: 2021-05-17 | End: 2021-05-17

## 2021-05-17 RX ORDER — IRON SUCROSE 20 MG/ML
200 INJECTION, SOLUTION INTRAVENOUS ONCE
Refills: 0 | Status: COMPLETED | OUTPATIENT
Start: 2021-05-17 | End: 2021-05-17

## 2021-05-17 RX ORDER — ACETAMINOPHEN 500 MG
650 TABLET ORAL ONCE
Refills: 0 | Status: COMPLETED | OUTPATIENT
Start: 2021-05-17 | End: 2021-05-17

## 2021-05-17 RX ADMIN — IRON SUCROSE 220 MILLIGRAM(S): 20 INJECTION, SOLUTION INTRAVENOUS at 15:04

## 2021-05-17 RX ADMIN — Medication 650 MILLIGRAM(S): at 15:03

## 2021-05-17 RX ADMIN — Medication 100 MILLIGRAM(S): at 15:03

## 2021-05-17 RX ADMIN — Medication 650 MILLIGRAM(S): at 15:04

## 2021-05-20 ENCOUNTER — LABORATORY RESULT (OUTPATIENT)
Age: 76
End: 2021-05-20

## 2021-05-20 ENCOUNTER — APPOINTMENT (OUTPATIENT)
Dept: INFUSION THERAPY | Facility: CLINIC | Age: 76
End: 2021-05-20

## 2021-05-20 LAB
ABO + RH PNL BLD: NORMAL
BLD GP AB SCN SERPL QL: NORMAL
HCT VFR BLD CALC: 25.1 %
HGB BLD-MCNC: 7.8 G/DL
IRON SATN MFR SERPL: 26 %
IRON SERPL-MCNC: 82 UG/DL
MCHC RBC-ENTMCNC: 26.8 PG
MCHC RBC-ENTMCNC: 31.1 G/DL
MCV RBC AUTO: 86.3 FL
PLATELET # BLD AUTO: 131 K/UL
PMV BLD: 8.9 FL
RBC # BLD: 2.91 M/UL
RBC # FLD: 14.2 %
TIBC SERPL-MCNC: 320 UG/DL
UIBC SERPL-MCNC: 238 UG/DL
WBC # FLD AUTO: 4.44 K/UL

## 2021-05-20 NOTE — ED PROVIDER NOTE - MDM ORDERS SUBMITTED SELECTION
No pallor, no cervical/supraclavicular/inguinal adenopathy.  No splenomegaly
Imaging Studies/EKG/Labs

## 2021-05-21 ENCOUNTER — APPOINTMENT (OUTPATIENT)
Dept: INFUSION THERAPY | Facility: CLINIC | Age: 76
End: 2021-05-21

## 2021-05-21 VITALS
RESPIRATION RATE: 18 BRPM | DIASTOLIC BLOOD PRESSURE: 77 MMHG | SYSTOLIC BLOOD PRESSURE: 123 MMHG | TEMPERATURE: 97 F | HEART RATE: 77 BPM

## 2021-05-21 VITALS
SYSTOLIC BLOOD PRESSURE: 170 MMHG | RESPIRATION RATE: 18 BRPM | TEMPERATURE: 96.8 F | HEART RATE: 76 BPM | DIASTOLIC BLOOD PRESSURE: 72 MMHG

## 2021-05-21 VITALS
HEART RATE: 74 BPM | SYSTOLIC BLOOD PRESSURE: 164 MMHG | DIASTOLIC BLOOD PRESSURE: 72 MMHG | RESPIRATION RATE: 16 BRPM | TEMPERATURE: 96.3 F

## 2021-05-21 VITALS
DIASTOLIC BLOOD PRESSURE: 64 MMHG | HEART RATE: 73 BPM | TEMPERATURE: 98.6 F | RESPIRATION RATE: 16 BRPM | OXYGEN SATURATION: 100 % | SYSTOLIC BLOOD PRESSURE: 138 MMHG

## 2021-05-21 LAB — FERRITIN SERPL-MCNC: 307 NG/ML

## 2021-05-21 RX ORDER — IRON SUCROSE 20 MG/ML
200 INJECTION, SOLUTION INTRAVENOUS ONCE
Refills: 0 | Status: COMPLETED | OUTPATIENT
Start: 2021-05-21 | End: 2021-05-21

## 2021-05-21 RX ORDER — ERYTHROPOIETIN 10000 [IU]/ML
10000 INJECTION, SOLUTION INTRAVENOUS; SUBCUTANEOUS ONCE
Refills: 0 | Status: COMPLETED | OUTPATIENT
Start: 2021-05-21 | End: 2021-05-21

## 2021-05-21 RX ORDER — HYDROCORTISONE 20 MG
100 TABLET ORAL ONCE
Refills: 0 | Status: COMPLETED | OUTPATIENT
Start: 2021-05-21 | End: 2021-05-21

## 2021-05-21 RX ORDER — ACETAMINOPHEN 500 MG
650 TABLET ORAL ONCE
Refills: 0 | Status: COMPLETED | OUTPATIENT
Start: 2021-05-21 | End: 2021-05-21

## 2021-05-21 RX ADMIN — ERYTHROPOIETIN 10000 UNIT(S): 10000 INJECTION, SOLUTION INTRAVENOUS; SUBCUTANEOUS at 14:46

## 2021-05-21 RX ADMIN — IRON SUCROSE 220 MILLIGRAM(S): 20 INJECTION, SOLUTION INTRAVENOUS at 11:59

## 2021-05-21 RX ADMIN — Medication 650 MILLIGRAM(S): at 11:59

## 2021-05-21 RX ADMIN — Medication 100 MILLIGRAM(S): at 11:58

## 2021-05-27 ENCOUNTER — APPOINTMENT (OUTPATIENT)
Dept: INFUSION THERAPY | Facility: CLINIC | Age: 76
End: 2021-05-27

## 2021-05-27 ENCOUNTER — LABORATORY RESULT (OUTPATIENT)
Age: 76
End: 2021-05-27

## 2021-05-27 LAB
HCT VFR BLD CALC: 28.9 %
HGB BLD-MCNC: 9.1 G/DL
MCHC RBC-ENTMCNC: 28 PG
MCHC RBC-ENTMCNC: 31.5 G/DL
MCV RBC AUTO: 88.9 FL
PLATELET # BLD AUTO: 92 K/UL
PMV BLD: 9.8 FL
RBC # BLD: 3.25 M/UL
RBC # FLD: 17.5 %
WBC # FLD AUTO: 3.65 K/UL

## 2021-05-27 RX ORDER — ACETAMINOPHEN 500 MG
650 TABLET ORAL ONCE
Refills: 0 | Status: COMPLETED | OUTPATIENT
Start: 2021-05-27 | End: 2021-05-27

## 2021-05-27 RX ORDER — HYDROCORTISONE 20 MG
100 TABLET ORAL ONCE
Refills: 0 | Status: COMPLETED | OUTPATIENT
Start: 2021-05-27 | End: 2021-05-27

## 2021-05-27 RX ORDER — IRON SUCROSE 20 MG/ML
200 INJECTION, SOLUTION INTRAVENOUS ONCE
Refills: 0 | Status: COMPLETED | OUTPATIENT
Start: 2021-05-27 | End: 2021-05-27

## 2021-05-27 RX ADMIN — IRON SUCROSE 220 MILLIGRAM(S): 20 INJECTION, SOLUTION INTRAVENOUS at 14:15

## 2021-05-27 RX ADMIN — Medication 100 MILLIGRAM(S): at 14:00

## 2021-05-27 RX ADMIN — Medication 650 MILLIGRAM(S): at 13:59

## 2021-05-28 LAB — FERRITIN SERPL-MCNC: 139 NG/ML

## 2021-06-03 ENCOUNTER — APPOINTMENT (OUTPATIENT)
Dept: INFUSION THERAPY | Facility: CLINIC | Age: 76
End: 2021-06-03

## 2021-06-03 ENCOUNTER — LABORATORY RESULT (OUTPATIENT)
Age: 76
End: 2021-06-03

## 2021-06-03 LAB
HCT VFR BLD CALC: 32.6 %
HGB BLD-MCNC: 10.2 G/DL
MCHC RBC-ENTMCNC: 28.1 PG
MCHC RBC-ENTMCNC: 31.3 G/DL
MCV RBC AUTO: 89.8 FL
PLATELET # BLD AUTO: 107 K/UL
PMV BLD: 8.9 FL
RBC # BLD: 3.63 M/UL
RBC # FLD: 18 %
WBC # FLD AUTO: 4.23 K/UL

## 2021-06-04 LAB — FERRITIN SERPL-MCNC: 143 NG/ML

## 2021-06-10 ENCOUNTER — LABORATORY RESULT (OUTPATIENT)
Age: 76
End: 2021-06-10

## 2021-06-10 ENCOUNTER — APPOINTMENT (OUTPATIENT)
Dept: INFUSION THERAPY | Facility: CLINIC | Age: 76
End: 2021-06-10

## 2021-06-10 ENCOUNTER — OUTPATIENT (OUTPATIENT)
Dept: OUTPATIENT SERVICES | Facility: HOSPITAL | Age: 76
LOS: 1 days | Discharge: HOME | End: 2021-06-10

## 2021-06-10 DIAGNOSIS — Z87.19 PERSONAL HISTORY OF OTHER DISEASES OF THE DIGESTIVE SYSTEM: Chronic | ICD-10-CM

## 2021-06-10 DIAGNOSIS — D64.9 ANEMIA, UNSPECIFIED: ICD-10-CM

## 2021-06-10 DIAGNOSIS — E61.1 IRON DEFICIENCY: ICD-10-CM

## 2021-06-10 LAB
HCT VFR BLD CALC: 32.9 %
HGB BLD-MCNC: 10.4 G/DL
MCHC RBC-ENTMCNC: 28.1 PG
MCHC RBC-ENTMCNC: 31.6 G/DL
MCV RBC AUTO: 88.9 FL
PLATELET # BLD AUTO: 116 K/UL
PMV BLD: 11 FL
RBC # BLD: 3.7 M/UL
RBC # FLD: 17.1 %
WBC # FLD AUTO: 4.7 K/UL

## 2021-06-10 RX ORDER — ACETAMINOPHEN 500 MG
650 TABLET ORAL ONCE
Refills: 0 | Status: COMPLETED | OUTPATIENT
Start: 2021-06-10 | End: 2021-06-10

## 2021-06-10 RX ORDER — IRON SUCROSE 20 MG/ML
200 INJECTION, SOLUTION INTRAVENOUS ONCE
Refills: 0 | Status: COMPLETED | OUTPATIENT
Start: 2021-06-10 | End: 2021-06-10

## 2021-06-10 RX ORDER — HYDROCORTISONE 20 MG
100 TABLET ORAL ONCE
Refills: 0 | Status: COMPLETED | OUTPATIENT
Start: 2021-06-10 | End: 2021-06-10

## 2021-06-10 RX ADMIN — IRON SUCROSE 220 MILLIGRAM(S): 20 INJECTION, SOLUTION INTRAVENOUS at 14:11

## 2021-06-10 RX ADMIN — IRON SUCROSE 200 MILLIGRAM(S): 20 INJECTION, SOLUTION INTRAVENOUS at 14:41

## 2021-06-10 RX ADMIN — Medication 650 MILLIGRAM(S): at 13:52

## 2021-06-10 RX ADMIN — Medication 650 MILLIGRAM(S): at 13:51

## 2021-06-10 RX ADMIN — Medication 100 MILLIGRAM(S): at 13:51

## 2021-06-10 RX ADMIN — Medication 100 MILLIGRAM(S): at 14:11

## 2021-06-13 LAB — FERRITIN SERPL-MCNC: 100 NG/ML

## 2021-06-17 ENCOUNTER — APPOINTMENT (OUTPATIENT)
Dept: INFUSION THERAPY | Facility: CLINIC | Age: 76
End: 2021-06-17

## 2021-06-17 ENCOUNTER — LABORATORY RESULT (OUTPATIENT)
Age: 76
End: 2021-06-17

## 2021-06-17 ENCOUNTER — APPOINTMENT (OUTPATIENT)
Dept: HEMATOLOGY ONCOLOGY | Facility: CLINIC | Age: 76
End: 2021-06-17

## 2021-06-17 VITALS
TEMPERATURE: 98 F | HEART RATE: 67 BPM | HEIGHT: 66 IN | BODY MASS INDEX: 40.5 KG/M2 | DIASTOLIC BLOOD PRESSURE: 63 MMHG | RESPIRATION RATE: 20 BRPM | SYSTOLIC BLOOD PRESSURE: 132 MMHG | WEIGHT: 252 LBS

## 2021-06-19 LAB
HCT VFR BLD CALC: 33.3 %
HGB BLD-MCNC: 10.6 G/DL
MCHC RBC-ENTMCNC: 28.3 PG
MCHC RBC-ENTMCNC: 31.8 G/DL
MCV RBC AUTO: 88.8 FL
PLATELET # BLD AUTO: 105 K/UL
PMV BLD: 10.3 FL
RBC # BLD: 3.75 M/UL
RBC # FLD: 17.1 %
WBC # FLD AUTO: 4.6 K/UL

## 2021-06-19 NOTE — PHYSICAL EXAM
[Restricted in physically strenuous activity but ambulatory and able to carry out work of a light or sedentary nature] : Status 1- Restricted in physically strenuous activity but ambulatory and able to carry out work of a light or sedentary nature, e.g., light house work, office work [Obese] : obese [Normal] : full range of motion and no deformities appreciated [de-identified] : murmur noted

## 2021-06-19 NOTE — HISTORY OF PRESENT ILLNESS
[de-identified] : bruce is a 74 year old white female with hypertension, chronic kidney disease, morbidly obese presents today with normocytic anemia. \par Her most recent CBC 04/01/2020 shows WBC -5.66, HB of 6.7, MCV -86.3, RDW - 17.3, platelet count- 196. Her Hb was normal until 08/2019. In October 2019 she noticed black tarry stools and was feeling tired. She went to ER and her Hb was 5.3. She was given 3 units of PRBC. Her iron studies at that showed ferritin of 8 and percent saturation of 5. She had EGD and colonoscopy which did not reveal any bleeding lesions. Following that event as per patient she was not given iron (?not sure why). She was readmitted to hospital in 01/2020 for SOB on exertion and her HB was noted to 6.0 again. She was given 2 units and her iron studies were consistent with DARRIN. B12 and folate were normal. Immunofixation showed weak IgG lambda migrating para protein. Free light chain ratio 2.1. Normal calcium. \par EGD and VCE was done. EGD revealed gastritis and VCE showed bleeding in small bowel. \par She was started on oral iron and she took for about three months. \par Her latest ferritin done in feb 2020 was 24 and percent saturation was 72% and her hb improved to 8.8. She also has push enteroscopy in 02/2020 and no bleeding lesions were found. Was recommended to have repeat colonoscopy and double balloon enteroscopy if she bleeds again.\par She went for blood work again on 04/01/2020 as she was feeling weak and having SOB and her hb dropped to 6.7. She was told by PMD to start on PROCRIT 10,000 units M-W-F. She was told her iron levels are good and she can stop iron. \par \par Today she feels very tired and her SOB is worse. She denies any melena or BRBPR in last few days. She does have anal fissure and hemorrhoids and bleeds when she is constipated. She denies hematuria or post menopausal bleeding. Denies weight loss. Does not take any NSAIDs or aspirin. She has not followed up with nephrology before.\par Family history is significant for breast cancer in her mother. She is a former smoker stopped 20 years ago.\par She is uptodate with mammogram and Pap smear. \par  [de-identified] : 09/17/2020 Patient returns for follow up , she feels better after venofer X 4 and on EPO 10,000 weekly , Hb is up to 10 . \par \par 10/15/2020 Patient returns for follow up 2 weeks after last dose of EPO , today's Hb is 10.7 . she offers no new complaints . \par \par 04/08/2021 Patient returns for follow up for anemia on EPO and iron replacement . Hb is 10.8 . she complains of mild left arm pain after she started to self check her BP . ahe meds were recently adjusted by her PMD . \par \par 06/17/2021: LATRIICA is here for follow up visit for anemia secondary to CKD and DARRIN. She denies any SOB, dizziness, increased fatigue, or unusual bleeding or bruising.  She continues on EPO weekly. She received venofer 200mg x 4 doses. Repeat Ferritin on 6/10/21 was 100. \par

## 2021-06-19 NOTE — ASSESSMENT
[FreeTextEntry1] : 75 year old female presents with symptomatic normocytic anemia secondary to chronic kidney disease and iron deficiency:  responding to EPO and venofer\par \par PLAN:\par -- CBC reviewed: Hgb 10.6. Will hold EPO today. Hgb goal >10. Continue with EPO weekly; if Hgb continues to remain stable will switch to every other week\par -- S/p venofer x4 doses. Repeat ferritin from 6/10/21 was 100. I advised patient we could schedule her for 1 additional iron infusion at this time. Patient deferred \par -- Follow up with GI as scheduled\par -- RTC in 1 month with CBC and ferritin \par \par \par

## 2021-06-24 ENCOUNTER — LABORATORY RESULT (OUTPATIENT)
Age: 76
End: 2021-06-24

## 2021-06-24 ENCOUNTER — APPOINTMENT (OUTPATIENT)
Dept: INFUSION THERAPY | Facility: CLINIC | Age: 76
End: 2021-06-24

## 2021-06-25 LAB
HCT VFR BLD CALC: 33.3 %
HGB BLD-MCNC: 10.7 G/DL
MCHC RBC-ENTMCNC: 28.5 PG
MCHC RBC-ENTMCNC: 32.1 G/DL
MCV RBC AUTO: 88.8 FL
PLATELET # BLD AUTO: 112 K/UL
PMV BLD: 9.5 FL
RBC # BLD: 3.75 M/UL
RBC # FLD: 16.4 %
WBC # FLD AUTO: 4.96 K/UL

## 2021-07-01 ENCOUNTER — LABORATORY RESULT (OUTPATIENT)
Age: 76
End: 2021-07-01

## 2021-07-01 ENCOUNTER — APPOINTMENT (OUTPATIENT)
Dept: INFUSION THERAPY | Facility: CLINIC | Age: 76
End: 2021-07-01

## 2021-07-01 LAB
HCT VFR BLD CALC: 33 %
HGB BLD-MCNC: 10.6 G/DL
MCHC RBC-ENTMCNC: 28.4 PG
MCHC RBC-ENTMCNC: 32.1 G/DL
MCV RBC AUTO: 88.5 FL
PLATELET # BLD AUTO: 110 K/UL
PMV BLD: 10 FL
RBC # BLD: 3.73 M/UL
RBC # FLD: 16.1 %
WBC # FLD AUTO: 5.03 K/UL

## 2021-07-13 ENCOUNTER — APPOINTMENT (OUTPATIENT)
Dept: INFUSION THERAPY | Facility: CLINIC | Age: 76
End: 2021-07-13

## 2021-07-13 ENCOUNTER — LABORATORY RESULT (OUTPATIENT)
Age: 76
End: 2021-07-13

## 2021-07-13 LAB
HCT VFR BLD CALC: 28.1 %
HGB BLD-MCNC: 9 G/DL
MCHC RBC-ENTMCNC: 28.8 PG
MCHC RBC-ENTMCNC: 32 G/DL
MCV RBC AUTO: 90.1 FL
PLATELET # BLD AUTO: 95 K/UL
PMV BLD: 9.3 FL
RBC # BLD: 3.12 M/UL
RBC # FLD: 15.9 %
WBC # FLD AUTO: 4.33 K/UL

## 2021-07-13 RX ORDER — ERYTHROPOIETIN 10000 [IU]/ML
10000 INJECTION, SOLUTION INTRAVENOUS; SUBCUTANEOUS ONCE
Refills: 0 | Status: COMPLETED | OUTPATIENT
Start: 2021-07-13 | End: 2021-07-13

## 2021-07-13 RX ORDER — IRON SUCROSE 20 MG/ML
200 INJECTION, SOLUTION INTRAVENOUS ONCE
Refills: 0 | Status: COMPLETED | OUTPATIENT
Start: 2021-07-13 | End: 2021-07-13

## 2021-07-13 RX ORDER — HYDROCORTISONE 20 MG
100 TABLET ORAL ONCE
Refills: 0 | Status: COMPLETED | OUTPATIENT
Start: 2021-07-13 | End: 2021-07-13

## 2021-07-13 RX ORDER — ACETAMINOPHEN 500 MG
650 TABLET ORAL ONCE
Refills: 0 | Status: COMPLETED | OUTPATIENT
Start: 2021-07-13 | End: 2021-07-13

## 2021-07-13 RX ADMIN — IRON SUCROSE 200 MILLIGRAM(S): 20 INJECTION, SOLUTION INTRAVENOUS at 15:00

## 2021-07-13 RX ADMIN — Medication 100 MILLIGRAM(S): at 14:30

## 2021-07-13 RX ADMIN — ERYTHROPOIETIN 10000 UNIT(S): 10000 INJECTION, SOLUTION INTRAVENOUS; SUBCUTANEOUS at 14:10

## 2021-07-13 RX ADMIN — IRON SUCROSE 220 MILLIGRAM(S): 20 INJECTION, SOLUTION INTRAVENOUS at 14:30

## 2021-07-13 RX ADMIN — Medication 100 MILLIGRAM(S): at 14:10

## 2021-07-13 RX ADMIN — Medication 650 MILLIGRAM(S): at 14:10

## 2021-07-14 LAB — FERRITIN SERPL-MCNC: 32 NG/ML

## 2021-07-15 ENCOUNTER — APPOINTMENT (OUTPATIENT)
Dept: HEMATOLOGY ONCOLOGY | Facility: CLINIC | Age: 76
End: 2021-07-15

## 2021-07-15 ENCOUNTER — APPOINTMENT (OUTPATIENT)
Dept: INFUSION THERAPY | Facility: CLINIC | Age: 76
End: 2021-07-15

## 2021-07-20 ENCOUNTER — LABORATORY RESULT (OUTPATIENT)
Age: 76
End: 2021-07-20

## 2021-07-20 ENCOUNTER — APPOINTMENT (OUTPATIENT)
Dept: HEMATOLOGY ONCOLOGY | Facility: CLINIC | Age: 76
End: 2021-07-20

## 2021-07-20 ENCOUNTER — APPOINTMENT (OUTPATIENT)
Dept: INFUSION THERAPY | Facility: CLINIC | Age: 76
End: 2021-07-20

## 2021-07-20 LAB
HCT VFR BLD CALC: 27.7 %
HGB BLD-MCNC: 8.9 G/DL
MCHC RBC-ENTMCNC: 29 PG
MCHC RBC-ENTMCNC: 32.1 G/DL
MCV RBC AUTO: 90.2 FL
PLATELET # BLD AUTO: 143 K/UL
PMV BLD: 9.9 FL
RBC # BLD: 3.07 M/UL
RBC # FLD: 16.3 %
WBC # FLD AUTO: 5.99 K/UL

## 2021-07-20 RX ORDER — HYDROCORTISONE 20 MG
100 TABLET ORAL ONCE
Refills: 0 | Status: COMPLETED | OUTPATIENT
Start: 2021-07-20 | End: 2021-07-20

## 2021-07-20 RX ORDER — ACETAMINOPHEN 500 MG
650 TABLET ORAL ONCE
Refills: 0 | Status: COMPLETED | OUTPATIENT
Start: 2021-07-20 | End: 2021-07-20

## 2021-07-20 RX ORDER — ERYTHROPOIETIN 10000 [IU]/ML
10000 INJECTION, SOLUTION INTRAVENOUS; SUBCUTANEOUS ONCE
Refills: 0 | Status: COMPLETED | OUTPATIENT
Start: 2021-07-20 | End: 2021-07-20

## 2021-07-20 RX ORDER — IRON SUCROSE 20 MG/ML
200 INJECTION, SOLUTION INTRAVENOUS ONCE
Refills: 0 | Status: COMPLETED | OUTPATIENT
Start: 2021-07-20 | End: 2021-07-20

## 2021-07-20 RX ADMIN — Medication 100 MILLIGRAM(S): at 15:14

## 2021-07-20 RX ADMIN — Medication 650 MILLIGRAM(S): at 15:16

## 2021-07-20 RX ADMIN — Medication 650 MILLIGRAM(S): at 15:14

## 2021-07-20 RX ADMIN — ERYTHROPOIETIN 10000 UNIT(S): 10000 INJECTION, SOLUTION INTRAVENOUS; SUBCUTANEOUS at 14:43

## 2021-07-20 RX ADMIN — IRON SUCROSE 220 MILLIGRAM(S): 20 INJECTION, SOLUTION INTRAVENOUS at 15:34

## 2021-07-20 RX ADMIN — Medication 100 MILLIGRAM(S): at 15:34

## 2021-07-20 RX ADMIN — IRON SUCROSE 200 MILLIGRAM(S): 20 INJECTION, SOLUTION INTRAVENOUS at 16:04

## 2021-07-21 LAB — FERRITIN SERPL-MCNC: 81 NG/ML

## 2021-07-28 ENCOUNTER — APPOINTMENT (OUTPATIENT)
Dept: CARDIOLOGY | Facility: CLINIC | Age: 76
End: 2021-07-28
Payer: MEDICARE

## 2021-07-28 VITALS
BODY MASS INDEX: 40.82 KG/M2 | HEART RATE: 68 BPM | SYSTOLIC BLOOD PRESSURE: 140 MMHG | DIASTOLIC BLOOD PRESSURE: 70 MMHG | TEMPERATURE: 98 F | HEIGHT: 66 IN | WEIGHT: 254 LBS

## 2021-07-28 PROCEDURE — 99072 ADDL SUPL MATRL&STAF TM PHE: CPT

## 2021-07-28 PROCEDURE — 99214 OFFICE O/P EST MOD 30 MIN: CPT

## 2021-07-28 PROCEDURE — 93000 ELECTROCARDIOGRAM COMPLETE: CPT

## 2021-07-28 RX ORDER — SUCRALFATE 1 G/1
1 TABLET ORAL
Refills: 0 | Status: DISCONTINUED | COMMUNITY
End: 2021-07-28

## 2021-07-28 RX ORDER — METOPROLOL SUCCINATE 25 MG/1
25 TABLET, EXTENDED RELEASE ORAL
Qty: 90 | Refills: 3 | Status: DISCONTINUED | COMMUNITY
End: 2021-07-28

## 2021-07-28 NOTE — HISTORY OF PRESENT ILLNESS
[FreeTextEntry1] : 70y/o F with Hx CAD, HTN,and angina pectoris on Metoprolol presents for cardiac follow up visit. Pt reports left sided chest tightness radiating to her upper abdomen. She denies SOB, dyspnea on exertion, palpitations, leg swelling, or syncope. Pt reports no other complaints. Pt is complaint with her medications.\par \par patient had an echo which showed moderate aortic stenosis\par gradient of 32mm hg \par lv function is normal\par \par no symptoms\par \par repeat echo is non diagnostic\par will follow medically\par possible cath in 3 mos. or sooner if symptoms get worse

## 2021-07-28 NOTE — PHYSICAL EXAM

## 2021-07-29 ENCOUNTER — LABORATORY RESULT (OUTPATIENT)
Age: 76
End: 2021-07-29

## 2021-07-29 ENCOUNTER — APPOINTMENT (OUTPATIENT)
Dept: INFUSION THERAPY | Facility: CLINIC | Age: 76
End: 2021-07-29

## 2021-07-29 LAB
HCT VFR BLD CALC: 26.6 %
HGB BLD-MCNC: 8.1 G/DL
MCHC RBC-ENTMCNC: 28.2 PG
MCHC RBC-ENTMCNC: 30.5 G/DL
MCV RBC AUTO: 92.7 FL
PLATELET # BLD AUTO: 113 K/UL
PMV BLD: 9.3 FL
RBC # BLD: 2.87 M/UL
RBC # FLD: 16.4 %
WBC # FLD AUTO: 4.57 K/UL

## 2021-07-29 RX ORDER — ERYTHROPOIETIN 10000 [IU]/ML
10000 INJECTION, SOLUTION INTRAVENOUS; SUBCUTANEOUS ONCE
Refills: 0 | Status: COMPLETED | OUTPATIENT
Start: 2021-07-29 | End: 2021-07-29

## 2021-07-29 RX ADMIN — ERYTHROPOIETIN 10000 UNIT(S): 10000 INJECTION, SOLUTION INTRAVENOUS; SUBCUTANEOUS at 13:45

## 2021-07-30 LAB — FERRITIN SERPL-MCNC: 71 NG/ML

## 2021-08-03 ENCOUNTER — LABORATORY RESULT (OUTPATIENT)
Age: 76
End: 2021-08-03

## 2021-08-03 ENCOUNTER — APPOINTMENT (OUTPATIENT)
Dept: HEMATOLOGY ONCOLOGY | Facility: CLINIC | Age: 76
End: 2021-08-03

## 2021-08-04 ENCOUNTER — APPOINTMENT (OUTPATIENT)
Dept: INFUSION THERAPY | Facility: CLINIC | Age: 76
End: 2021-08-04

## 2021-08-04 RX ORDER — ERYTHROPOIETIN 10000 [IU]/ML
10000 INJECTION, SOLUTION INTRAVENOUS; SUBCUTANEOUS ONCE
Refills: 0 | Status: COMPLETED | OUTPATIENT
Start: 2021-08-04 | End: 2021-08-04

## 2021-08-04 RX ORDER — ACETAMINOPHEN 500 MG
650 TABLET ORAL ONCE
Refills: 0 | Status: COMPLETED | OUTPATIENT
Start: 2021-08-04 | End: 2021-08-04

## 2021-08-04 RX ORDER — IRON SUCROSE 20 MG/ML
200 INJECTION, SOLUTION INTRAVENOUS ONCE
Refills: 0 | Status: DISCONTINUED | OUTPATIENT
Start: 2021-08-04 | End: 2021-08-04

## 2021-08-04 RX ORDER — HYDROCORTISONE 20 MG
100 TABLET ORAL ONCE
Refills: 0 | Status: COMPLETED | OUTPATIENT
Start: 2021-08-04 | End: 2021-08-04

## 2021-08-04 RX ORDER — IRON SUCROSE 20 MG/ML
200 INJECTION, SOLUTION INTRAVENOUS ONCE
Refills: 0 | Status: COMPLETED | OUTPATIENT
Start: 2021-08-04 | End: 2021-08-04

## 2021-08-04 RX ADMIN — ERYTHROPOIETIN 10000 UNIT(S): 10000 INJECTION, SOLUTION INTRAVENOUS; SUBCUTANEOUS at 12:45

## 2021-08-04 RX ADMIN — IRON SUCROSE 220 MILLIGRAM(S): 20 INJECTION, SOLUTION INTRAVENOUS at 12:45

## 2021-08-04 RX ADMIN — Medication 100 MILLIGRAM(S): at 12:45

## 2021-08-04 RX ADMIN — Medication 650 MILLIGRAM(S): at 12:45

## 2021-08-05 ENCOUNTER — APPOINTMENT (OUTPATIENT)
Dept: INFUSION THERAPY | Facility: CLINIC | Age: 76
End: 2021-08-05
Payer: MEDICARE

## 2021-08-05 ENCOUNTER — APPOINTMENT (OUTPATIENT)
Dept: HEMATOLOGY ONCOLOGY | Facility: CLINIC | Age: 76
End: 2021-08-05
Payer: MEDICARE

## 2021-08-05 VITALS
DIASTOLIC BLOOD PRESSURE: 54 MMHG | HEART RATE: 63 BPM | TEMPERATURE: 98.1 F | RESPIRATION RATE: 18 BRPM | SYSTOLIC BLOOD PRESSURE: 114 MMHG

## 2021-08-05 VITALS
SYSTOLIC BLOOD PRESSURE: 134 MMHG | DIASTOLIC BLOOD PRESSURE: 60 MMHG | RESPIRATION RATE: 16 BRPM | HEART RATE: 61 BPM | TEMPERATURE: 97.9 F

## 2021-08-05 VITALS
DIASTOLIC BLOOD PRESSURE: 59 MMHG | HEART RATE: 65 BPM | RESPIRATION RATE: 18 BRPM | TEMPERATURE: 98.1 F | SYSTOLIC BLOOD PRESSURE: 124 MMHG

## 2021-08-05 VITALS
HEIGHT: 66 IN | BODY MASS INDEX: 40.66 KG/M2 | SYSTOLIC BLOOD PRESSURE: 124 MMHG | RESPIRATION RATE: 18 BRPM | TEMPERATURE: 97.9 F | HEART RATE: 64 BPM | DIASTOLIC BLOOD PRESSURE: 57 MMHG | WEIGHT: 253 LBS

## 2021-08-05 LAB
ABO + RH PNL BLD: NORMAL
FERRITIN SERPL-MCNC: 35 NG/ML
HCT VFR BLD CALC: 24.2 %
HGB BLD-MCNC: 7.3 G/DL
MCHC RBC-ENTMCNC: 27.9 PG
MCHC RBC-ENTMCNC: 30.2 G/DL
MCV RBC AUTO: 92.4 FL
PLATELET # BLD AUTO: 102 K/UL
PMV BLD: NORMAL
RBC # BLD: 2.62 M/UL
RBC # FLD: 15.5 %
WBC # FLD AUTO: 3.7 K/UL

## 2021-08-05 PROCEDURE — 99213 OFFICE O/P EST LOW 20 MIN: CPT

## 2021-08-05 NOTE — ASSESSMENT
[FreeTextEntry1] : 75 year old female presents with symptomatic normocytic anemia secondary to chronic kidney disease and iron deficiency:  responding to EPO and venofer\par \par PLAN:\par -- CBC reviewed: Hgb 7.3.\par patient will have one unit of Blood Transfusion.\par -- S/p venofer x 4 doses. Repeat ferritin from 8/4/21 is 35. I advised patient we could schedule her for 1 additional iron infusion next week.\par -- Strongly recommend to follow up with GI as scheduled.\par -- RTC in 1 month with CBC and ferritin \par \par \par

## 2021-08-05 NOTE — PHYSICAL EXAM
[Restricted in physically strenuous activity but ambulatory and able to carry out work of a light or sedentary nature] : Status 1- Restricted in physically strenuous activity but ambulatory and able to carry out work of a light or sedentary nature, e.g., light house work, office work [Obese] : obese [Normal] : full range of motion and no deformities appreciated [de-identified] : murmur noted

## 2021-08-05 NOTE — HISTORY OF PRESENT ILLNESS
[de-identified] : bruce is a 74 year old white female with hypertension, chronic kidney disease, morbidly obese presents today with normocytic anemia. \par Her most recent CBC 04/01/2020 shows WBC -5.66, HB of 6.7, MCV -86.3, RDW - 17.3, platelet count- 196. Her Hb was normal until 08/2019. In October 2019 she noticed black tarry stools and was feeling tired. She went to ER and her Hb was 5.3. She was given 3 units of PRBC. Her iron studies at that showed ferritin of 8 and percent saturation of 5. She had EGD and colonoscopy which did not reveal any bleeding lesions. Following that event as per patient she was not given iron (?not sure why). She was readmitted to hospital in 01/2020 for SOB on exertion and her HB was noted to 6.0 again. She was given 2 units and her iron studies were consistent with DARRIN. B12 and folate were normal. Immunofixation showed weak IgG lambda migrating para protein. Free light chain ratio 2.1. Normal calcium. \par EGD and VCE was done. EGD revealed gastritis and VCE showed bleeding in small bowel. \par She was started on oral iron and she took for about three months. \par Her latest ferritin done in feb 2020 was 24 and percent saturation was 72% and her hb improved to 8.8. She also has push enteroscopy in 02/2020 and no bleeding lesions were found. Was recommended to have repeat colonoscopy and double balloon enteroscopy if she bleeds again.\par She went for blood work again on 04/01/2020 as she was feeling weak and having SOB and her hb dropped to 6.7. She was told by PMD to start on PROCRIT 10,000 units M-W-F. She was told her iron levels are good and she can stop iron. \par \par Today she feels very tired and her SOB is worse. She denies any melena or BRBPR in last few days. She does have anal fissure and hemorrhoids and bleeds when she is constipated. She denies hematuria or post menopausal bleeding. Denies weight loss. Does not take any NSAIDs or aspirin. She has not followed up with nephrology before.\par Family history is significant for breast cancer in her mother. She is a former smoker stopped 20 years ago.\par She is uptodate with mammogram and Pap smear. \par  [de-identified] : 09/17/2020 Patient returns for follow up , she feels better after venofer X 4 and on EPO 10,000 weekly , Hb is up to 10 . \par \par 10/15/2020 Patient returns for follow up 2 weeks after last dose of EPO , today's Hb is 10.7 . she offers no new complaints . \par \par 04/08/2021 Patient returns for follow up for anemia on EPO and iron replacement . Hb is 10.8 . she complains of mild left arm pain after she started to self check her BP . ahe meds were recently adjusted by her PMD . \par \par 06/17/2021: LATRICIA is here for follow up visit for anemia secondary to CKD and DARRIN. She denies any SOB, dizziness, increased fatigue, or unusual bleeding or bruising.  She continues on EPO weekly. She received venofer 200mg x 4 doses. Repeat Ferritin on 6/10/21 was 100. \par 8/5/21:\par LATRICIA is here for follow up visit for anemia secondary to CKD and DARRIN. She reports feeling weak, She  denies  SOB, dizziness, increased fatigue, or unusual bleeding or bruising.  She continues on EPO weekly.  She received venofer 200mg x 4 doses. \par We reviewed CBC from 8/3  HGb/ HCt is 7.3/24.2 despite  Venofer infusion and Retacrit.  patient schedule for blood transfusion today. \par

## 2021-08-10 ENCOUNTER — LABORATORY RESULT (OUTPATIENT)
Age: 76
End: 2021-08-10

## 2021-08-10 ENCOUNTER — APPOINTMENT (OUTPATIENT)
Dept: INFUSION THERAPY | Facility: CLINIC | Age: 76
End: 2021-08-10

## 2021-08-10 LAB
HCT VFR BLD CALC: 28.4 %
HGB BLD-MCNC: 8.9 G/DL
MCHC RBC-ENTMCNC: 28.5 PG
MCHC RBC-ENTMCNC: 31.3 G/DL
MCV RBC AUTO: 91 FL
PLATELET # BLD AUTO: 115 K/UL
PMV BLD: 10.1 FL
RBC # BLD: 3.12 M/UL
RBC # FLD: 15.8 %
WBC # FLD AUTO: 5.44 K/UL

## 2021-08-10 RX ORDER — ACETAMINOPHEN 500 MG
650 TABLET ORAL ONCE
Refills: 0 | Status: COMPLETED | OUTPATIENT
Start: 2021-08-10 | End: 2021-08-10

## 2021-08-10 RX ORDER — ERYTHROPOIETIN 10000 [IU]/ML
10000 INJECTION, SOLUTION INTRAVENOUS; SUBCUTANEOUS ONCE
Refills: 0 | Status: COMPLETED | OUTPATIENT
Start: 2021-08-10 | End: 2021-08-10

## 2021-08-10 RX ORDER — HYDROCORTISONE 20 MG
100 TABLET ORAL ONCE
Refills: 0 | Status: COMPLETED | OUTPATIENT
Start: 2021-08-10 | End: 2021-08-10

## 2021-08-10 RX ORDER — IRON SUCROSE 20 MG/ML
200 INJECTION, SOLUTION INTRAVENOUS ONCE
Refills: 0 | Status: COMPLETED | OUTPATIENT
Start: 2021-08-10 | End: 2021-08-10

## 2021-08-10 RX ADMIN — ERYTHROPOIETIN 10000 UNIT(S): 10000 INJECTION, SOLUTION INTRAVENOUS; SUBCUTANEOUS at 12:53

## 2021-08-10 RX ADMIN — IRON SUCROSE 220 MILLIGRAM(S): 20 INJECTION, SOLUTION INTRAVENOUS at 12:53

## 2021-08-10 RX ADMIN — Medication 100 MILLIGRAM(S): at 13:13

## 2021-08-10 RX ADMIN — Medication 650 MILLIGRAM(S): at 13:13

## 2021-08-16 ENCOUNTER — APPOINTMENT (OUTPATIENT)
Dept: CARDIOLOGY | Facility: CLINIC | Age: 76
End: 2021-08-16

## 2021-08-17 ENCOUNTER — LABORATORY RESULT (OUTPATIENT)
Age: 76
End: 2021-08-17

## 2021-08-17 ENCOUNTER — APPOINTMENT (OUTPATIENT)
Dept: INFUSION THERAPY | Facility: CLINIC | Age: 76
End: 2021-08-17

## 2021-08-17 LAB
HCT VFR BLD CALC: 29.8 %
HGB BLD-MCNC: 9.3 G/DL
MCHC RBC-ENTMCNC: 28.7 PG
MCHC RBC-ENTMCNC: 31.2 G/DL
MCV RBC AUTO: 92 FL
PLATELET # BLD AUTO: 119 K/UL
PMV BLD: 9.8 FL
RBC # BLD: 3.24 M/UL
RBC # FLD: 16.5 %
WBC # FLD AUTO: 4.68 K/UL

## 2021-08-17 RX ORDER — ERYTHROPOIETIN 10000 [IU]/ML
10000 INJECTION, SOLUTION INTRAVENOUS; SUBCUTANEOUS ONCE
Refills: 0 | Status: COMPLETED | OUTPATIENT
Start: 2021-08-17 | End: 2021-08-17

## 2021-08-17 RX ADMIN — ERYTHROPOIETIN 10000 UNIT(S): 10000 INJECTION, SOLUTION INTRAVENOUS; SUBCUTANEOUS at 13:23

## 2021-08-18 ENCOUNTER — APPOINTMENT (OUTPATIENT)
Dept: CARDIOLOGY | Facility: CLINIC | Age: 76
End: 2021-08-18

## 2021-08-25 ENCOUNTER — LABORATORY RESULT (OUTPATIENT)
Age: 76
End: 2021-08-25

## 2021-08-25 ENCOUNTER — APPOINTMENT (OUTPATIENT)
Dept: INFUSION THERAPY | Facility: CLINIC | Age: 76
End: 2021-08-25

## 2021-08-25 RX ORDER — ERYTHROPOIETIN 10000 [IU]/ML
10000 INJECTION, SOLUTION INTRAVENOUS; SUBCUTANEOUS ONCE
Refills: 0 | Status: COMPLETED | OUTPATIENT
Start: 2021-08-25 | End: 2021-08-25

## 2021-08-25 RX ADMIN — ERYTHROPOIETIN 10000 UNIT(S): 10000 INJECTION, SOLUTION INTRAVENOUS; SUBCUTANEOUS at 15:20

## 2021-08-26 LAB
HCT VFR BLD CALC: 30.1 %
HGB BLD-MCNC: 9.5 G/DL
MCHC RBC-ENTMCNC: 28.2 PG
MCHC RBC-ENTMCNC: 31.6 G/DL
MCV RBC AUTO: 89.3 FL
PLATELET # BLD AUTO: 111 K/UL
PMV BLD: 10.2 FL
RBC # BLD: 3.37 M/UL
RBC # FLD: 15.5 %
WBC # FLD AUTO: 4.66 K/UL

## 2021-08-30 NOTE — H&P ADULT - NSHPPHYSICALEXAM_GEN_ALL_CORE
SKIN: warm, dry.  HEAD: Normocephalic; atraumatic.  EYES: PERRL, EOMI, conjunctival pallor.   ENT: No nasal discharge; airway clear.  NECK: Supple; non tender.  CARD: S1, S2 normal; +holosystolic murmur. Regular rate and rhythm.   RESP: bibasilar crackles.   ABD: soft ntnd.   Rectal: black stool on exam. (Done by ED) external hemorrhoids.   EXT: Normal ROM.  No clubbing, cyanosis or edema.   NEURO: Alert, oriented, grossly unremarkable  PSYCH: Cooperative, appropriate. SKIN: warm, dry.  HEAD: Normocephalic; atraumatic.  EYES: PERRL, EOMI, conjunctival pallor.   ENT: No nasal discharge; airway clear.  NECK: Supple; non tender.  CARD: S1, S2 normal; +holosystolic murmur. Regular rate and rhythm.   RESP: bibasilar crackles.   ABD: soft, abdominal tenderness in the epigastric region.  Rectal: black stool on exam. (Done by ED) external hemorrhoids.   EXT: Normal ROM.  No clubbing, cyanosis or edema.   NEURO: Alert, oriented, grossly unremarkable  PSYCH: Cooperative, appropriate. PAST MEDICAL HISTORY:  No pertinent past medical history

## 2021-08-31 ENCOUNTER — APPOINTMENT (OUTPATIENT)
Dept: INFUSION THERAPY | Facility: CLINIC | Age: 76
End: 2021-08-31

## 2021-08-31 ENCOUNTER — APPOINTMENT (OUTPATIENT)
Dept: HEMATOLOGY ONCOLOGY | Facility: CLINIC | Age: 76
End: 2021-08-31

## 2021-08-31 ENCOUNTER — LABORATORY RESULT (OUTPATIENT)
Age: 76
End: 2021-08-31

## 2021-08-31 ENCOUNTER — APPOINTMENT (OUTPATIENT)
Dept: HEMATOLOGY ONCOLOGY | Facility: CLINIC | Age: 76
End: 2021-08-31
Payer: MEDICARE

## 2021-08-31 VITALS
RESPIRATION RATE: 18 BRPM | WEIGHT: 255 LBS | HEART RATE: 65 BPM | HEIGHT: 66 IN | SYSTOLIC BLOOD PRESSURE: 159 MMHG | DIASTOLIC BLOOD PRESSURE: 76 MMHG | TEMPERATURE: 97.2 F | BODY MASS INDEX: 40.98 KG/M2

## 2021-08-31 LAB
HCT VFR BLD CALC: 29.1 %
HGB BLD-MCNC: 9.1 G/DL
MCHC RBC-ENTMCNC: 27.7 PG
MCHC RBC-ENTMCNC: 31.3 G/DL
MCV RBC AUTO: 88.7 FL
PLATELET # BLD AUTO: 132 K/UL
PMV BLD: 9.7 FL
RBC # BLD: 3.28 M/UL
RBC # FLD: 14.7 %
WBC # FLD AUTO: 5.28 K/UL

## 2021-08-31 PROCEDURE — 99213 OFFICE O/P EST LOW 20 MIN: CPT

## 2021-08-31 RX ORDER — ERYTHROPOIETIN 10000 [IU]/ML
10000 INJECTION, SOLUTION INTRAVENOUS; SUBCUTANEOUS ONCE
Refills: 0 | Status: COMPLETED | OUTPATIENT
Start: 2021-08-31 | End: 2021-08-31

## 2021-08-31 RX ADMIN — ERYTHROPOIETIN 10000 UNIT(S): 10000 INJECTION, SOLUTION INTRAVENOUS; SUBCUTANEOUS at 16:38

## 2021-08-31 NOTE — HISTORY OF PRESENT ILLNESS
[de-identified] : bruce is a 74 year old white female with hypertension, chronic kidney disease, morbidly obese presents today with normocytic anemia. \par Her most recent CBC 04/01/2020 shows WBC -5.66, HB of 6.7, MCV -86.3, RDW - 17.3, platelet count- 196. Her Hb was normal until 08/2019. In October 2019 she noticed black tarry stools and was feeling tired. She went to ER and her Hb was 5.3. She was given 3 units of PRBC. Her iron studies at that showed ferritin of 8 and percent saturation of 5. She had EGD and colonoscopy which did not reveal any bleeding lesions. Following that event as per patient she was not given iron (?not sure why). She was readmitted to hospital in 01/2020 for SOB on exertion and her HB was noted to 6.0 again. She was given 2 units and her iron studies were consistent with DARRIN. B12 and folate were normal. Immunofixation showed weak IgG lambda migrating para protein. Free light chain ratio 2.1. Normal calcium. \par EGD and VCE was done. EGD revealed gastritis and VCE showed bleeding in small bowel. \par She was started on oral iron and she took for about three months. \par Her latest ferritin done in feb 2020 was 24 and percent saturation was 72% and her hb improved to 8.8. She also has push enteroscopy in 02/2020 and no bleeding lesions were found. Was recommended to have repeat colonoscopy and double balloon enteroscopy if she bleeds again.\par She went for blood work again on 04/01/2020 as she was feeling weak and having SOB and her hb dropped to 6.7. She was told by PMD to start on PROCRIT 10,000 units M-W-F. She was told her iron levels are good and she can stop iron. \par \par Today she feels very tired and her SOB is worse. She denies any melena or BRBPR in last few days. She does have anal fissure and hemorrhoids and bleeds when she is constipated. She denies hematuria or post menopausal bleeding. Denies weight loss. Does not take any NSAIDs or aspirin. She has not followed up with nephrology before.\par Family history is significant for breast cancer in her mother. She is a former smoker stopped 20 years ago.\par She is uptodate with mammogram and Pap smear. \par  [de-identified] : 09/17/2020 Patient returns for follow up , she feels better after venofer X 4 and on EPO 10,000 weekly , Hb is up to 10 . \par \par 10/15/2020 Patient returns for follow up 2 weeks after last dose of EPO , today's Hb is 10.7 . she offers no new complaints . \par \par 04/08/2021 Patient returns for follow up for anemia on EPO and iron replacement . Hb is 10.8 . she complains of mild left arm pain after she started to self check her BP . ahe meds were recently adjusted by her PMD . \par \par 08/31/2021 patient returns for follow up , she offers no new complaints , her Hgb is slowly trending down after last transfusion and venofer X1 for ferritin : 35 .

## 2021-08-31 NOTE — ASSESSMENT
[FreeTextEntry1] : 74 year old female presents with symptomatic normocytic anemia secondary to chronic kidney disease and iron deficiency , on  EPO and venofer . rule out obscure GI bleeding . \par Plan : continue EPO \par          resume venofer X3 . \par

## 2021-09-02 ENCOUNTER — APPOINTMENT (OUTPATIENT)
Dept: HEMATOLOGY ONCOLOGY | Facility: CLINIC | Age: 76
End: 2021-09-02

## 2021-09-07 ENCOUNTER — LABORATORY RESULT (OUTPATIENT)
Age: 76
End: 2021-09-07

## 2021-09-07 ENCOUNTER — APPOINTMENT (OUTPATIENT)
Dept: INFUSION THERAPY | Facility: CLINIC | Age: 76
End: 2021-09-07

## 2021-09-07 RX ORDER — IRON SUCROSE 20 MG/ML
200 INJECTION, SOLUTION INTRAVENOUS ONCE
Refills: 0 | Status: COMPLETED | OUTPATIENT
Start: 2021-09-07 | End: 2021-09-07

## 2021-09-07 RX ORDER — ERYTHROPOIETIN 10000 [IU]/ML
10000 INJECTION, SOLUTION INTRAVENOUS; SUBCUTANEOUS ONCE
Refills: 0 | Status: COMPLETED | OUTPATIENT
Start: 2021-09-07 | End: 2021-09-07

## 2021-09-07 RX ADMIN — IRON SUCROSE 220 MILLIGRAM(S): 20 INJECTION, SOLUTION INTRAVENOUS at 15:40

## 2021-09-07 RX ADMIN — IRON SUCROSE 200 MILLIGRAM(S): 20 INJECTION, SOLUTION INTRAVENOUS at 16:15

## 2021-09-07 RX ADMIN — ERYTHROPOIETIN 10000 UNIT(S): 10000 INJECTION, SOLUTION INTRAVENOUS; SUBCUTANEOUS at 15:40

## 2021-09-08 LAB
HCT VFR BLD CALC: 27.7 %
HGB BLD-MCNC: 8.6 G/DL
MCHC RBC-ENTMCNC: 27.2 PG
MCHC RBC-ENTMCNC: 31 G/DL
MCV RBC AUTO: 87.7 FL
PLATELET # BLD AUTO: 117 K/UL
PMV BLD: 10.4 FL
RBC # BLD: 3.16 M/UL
RBC # FLD: 14.6 %
WBC # FLD AUTO: 4.11 K/UL

## 2021-09-14 ENCOUNTER — APPOINTMENT (OUTPATIENT)
Dept: INFUSION THERAPY | Facility: CLINIC | Age: 76
End: 2021-09-14

## 2021-09-14 ENCOUNTER — LABORATORY RESULT (OUTPATIENT)
Age: 76
End: 2021-09-14

## 2021-09-14 RX ORDER — ERYTHROPOIETIN 10000 [IU]/ML
10000 INJECTION, SOLUTION INTRAVENOUS; SUBCUTANEOUS ONCE
Refills: 0 | Status: COMPLETED | OUTPATIENT
Start: 2021-09-14 | End: 2021-09-14

## 2021-09-14 RX ORDER — IRON SUCROSE 20 MG/ML
200 INJECTION, SOLUTION INTRAVENOUS ONCE
Refills: 0 | Status: COMPLETED | OUTPATIENT
Start: 2021-09-14 | End: 2021-09-14

## 2021-09-14 RX ADMIN — IRON SUCROSE 220 MILLIGRAM(S): 20 INJECTION, SOLUTION INTRAVENOUS at 14:11

## 2021-09-14 RX ADMIN — ERYTHROPOIETIN 10000 UNIT(S): 10000 INJECTION, SOLUTION INTRAVENOUS; SUBCUTANEOUS at 14:11

## 2021-09-15 LAB
HCT VFR BLD CALC: 28.1 %
HGB BLD-MCNC: 8.7 G/DL
MCHC RBC-ENTMCNC: 27.5 PG
MCHC RBC-ENTMCNC: 31 G/DL
MCV RBC AUTO: 88.9 FL
PLATELET # BLD AUTO: 112 K/UL
PMV BLD: 10.5 FL
RBC # BLD: 3.16 M/UL
RBC # FLD: 15.5 %
WBC # FLD AUTO: 4.43 K/UL

## 2021-09-20 ENCOUNTER — APPOINTMENT (OUTPATIENT)
Dept: CARDIOLOGY | Facility: CLINIC | Age: 76
End: 2021-09-20

## 2021-09-21 ENCOUNTER — LABORATORY RESULT (OUTPATIENT)
Age: 76
End: 2021-09-21

## 2021-09-21 ENCOUNTER — APPOINTMENT (OUTPATIENT)
Dept: INFUSION THERAPY | Facility: CLINIC | Age: 76
End: 2021-09-21

## 2021-09-21 LAB
HCT VFR BLD CALC: 28.1 %
HGB BLD-MCNC: 8.7 G/DL
MCHC RBC-ENTMCNC: 28 PG
MCHC RBC-ENTMCNC: 31 G/DL
MCV RBC AUTO: 90.4 FL
PLATELET # BLD AUTO: 129 K/UL
PMV BLD: 9.5 FL
RBC # BLD: 3.11 M/UL
RBC # FLD: 16.9 %
WBC # FLD AUTO: 3.89 K/UL

## 2021-09-21 RX ORDER — HYDROCORTISONE 20 MG
100 TABLET ORAL ONCE
Refills: 0 | Status: COMPLETED | OUTPATIENT
Start: 2021-09-21 | End: 2021-09-21

## 2021-09-21 RX ORDER — IRON SUCROSE 20 MG/ML
200 INJECTION, SOLUTION INTRAVENOUS ONCE
Refills: 0 | Status: COMPLETED | OUTPATIENT
Start: 2021-09-21 | End: 2021-09-21

## 2021-09-21 RX ORDER — ERYTHROPOIETIN 10000 [IU]/ML
10000 INJECTION, SOLUTION INTRAVENOUS; SUBCUTANEOUS ONCE
Refills: 0 | Status: COMPLETED | OUTPATIENT
Start: 2021-09-21 | End: 2021-09-21

## 2021-09-21 RX ORDER — ACETAMINOPHEN 500 MG
650 TABLET ORAL ONCE
Refills: 0 | Status: COMPLETED | OUTPATIENT
Start: 2021-09-21 | End: 2021-09-21

## 2021-09-21 RX ADMIN — Medication 650 MILLIGRAM(S): at 15:29

## 2021-09-21 RX ADMIN — IRON SUCROSE 260 MILLIGRAM(S): 20 INJECTION, SOLUTION INTRAVENOUS at 15:29

## 2021-09-21 RX ADMIN — ERYTHROPOIETIN 10000 UNIT(S): 10000 INJECTION, SOLUTION INTRAVENOUS; SUBCUTANEOUS at 15:29

## 2021-09-21 RX ADMIN — Medication 100 MILLIGRAM(S): at 15:29

## 2021-09-21 RX ADMIN — Medication 650 MILLIGRAM(S): at 15:30

## 2021-09-22 ENCOUNTER — APPOINTMENT (OUTPATIENT)
Dept: CARDIOLOGY | Facility: CLINIC | Age: 76
End: 2021-09-22

## 2021-09-25 ENCOUNTER — APPOINTMENT (OUTPATIENT)
Dept: CARDIOLOGY | Facility: CLINIC | Age: 76
End: 2021-09-25
Payer: MEDICARE

## 2021-09-25 PROCEDURE — 93306 TTE W/DOPPLER COMPLETE: CPT

## 2021-09-28 ENCOUNTER — APPOINTMENT (OUTPATIENT)
Dept: HEMATOLOGY ONCOLOGY | Facility: CLINIC | Age: 76
End: 2021-09-28
Payer: MEDICARE

## 2021-09-28 ENCOUNTER — LABORATORY RESULT (OUTPATIENT)
Age: 76
End: 2021-09-28

## 2021-09-28 ENCOUNTER — APPOINTMENT (OUTPATIENT)
Dept: INFUSION THERAPY | Facility: CLINIC | Age: 76
End: 2021-09-28
Payer: MEDICARE

## 2021-09-28 VITALS
OXYGEN SATURATION: 99 % | WEIGHT: 252 LBS | BODY MASS INDEX: 40.5 KG/M2 | TEMPERATURE: 97.1 F | HEART RATE: 65 BPM | HEIGHT: 66 IN | RESPIRATION RATE: 18 BRPM | SYSTOLIC BLOOD PRESSURE: 174 MMHG | DIASTOLIC BLOOD PRESSURE: 77 MMHG

## 2021-09-28 VITALS — SYSTOLIC BLOOD PRESSURE: 143 MMHG | DIASTOLIC BLOOD PRESSURE: 84 MMHG

## 2021-09-28 LAB
HCT VFR BLD CALC: 28 %
HGB BLD-MCNC: 8.7 G/DL
MCHC RBC-ENTMCNC: 27.9 PG
MCHC RBC-ENTMCNC: 31.1 G/DL
MCV RBC AUTO: 89.7 FL
PLATELET # BLD AUTO: 99 K/UL
PMV BLD: 9.2 FL
RBC # BLD: 3.12 M/UL
RBC # FLD: 17.2 %
WBC # FLD AUTO: 4.01 K/UL

## 2021-09-28 PROCEDURE — 99213 OFFICE O/P EST LOW 20 MIN: CPT

## 2021-09-28 RX ORDER — ERYTHROPOIETIN 10000 [IU]/ML
10000 INJECTION, SOLUTION INTRAVENOUS; SUBCUTANEOUS ONCE
Refills: 0 | Status: COMPLETED | OUTPATIENT
Start: 2021-09-28 | End: 2021-09-28

## 2021-09-28 RX ADMIN — ERYTHROPOIETIN 10000 UNIT(S): 10000 INJECTION, SOLUTION INTRAVENOUS; SUBCUTANEOUS at 16:43

## 2021-09-28 NOTE — REVIEW OF SYSTEMS
[Fatigue] : fatigue [Shortness Of Breath] : shortness of breath [Negative] : Musculoskeletal [Lower Ext Edema] : no lower extremity edema [FreeTextEntry5] : SOB on exertion

## 2021-09-28 NOTE — PHYSICAL EXAM
[Ambulatory and capable of all self care but unable to carry out any work activities] : Status 2- Ambulatory and capable of all self care but unable to carry out any work activities. Up and about more than 50% of waking hours [Obese] : obese [Normal] : RRR, normal S1S2, no murmurs, rubs, gallops

## 2021-09-28 NOTE — HISTORY OF PRESENT ILLNESS
[de-identified] : bruce is a 74 year old white female with hypertension, chronic kidney disease, morbidly obese presents today with normocytic anemia. \par Her most recent CBC 04/01/2020 shows WBC -5.66, HB of 6.7, MCV -86.3, RDW - 17.3, platelet count- 196. Her Hb was normal until 08/2019. In October 2019 she noticed black tarry stools and was feeling tired. She went to ER and her Hb was 5.3. She was given 3 units of PRBC. Her iron studies at that showed ferritin of 8 and percent saturation of 5. She had EGD and colonoscopy which did not reveal any bleeding lesions. Following that event as per patient she was not given iron (?not sure why). She was readmitted to hospital in 01/2020 for SOB on exertion and her HB was noted to 6.0 again. She was given 2 units and her iron studies were consistent with DARRIN. B12 and folate were normal. Immunofixation showed weak IgG lambda migrating para protein. Free light chain ratio 2.1. Normal calcium. \par EGD and VCE was done. EGD revealed gastritis and VCE showed bleeding in small bowel. \par She was started on oral iron and she took for about three months. \par Her latest ferritin done in feb 2020 was 24 and percent saturation was 72% and her hb improved to 8.8. She also has push enteroscopy in 02/2020 and no bleeding lesions were found. Was recommended to have repeat colonoscopy and double balloon enteroscopy if she bleeds again.\par She went for blood work again on 04/01/2020 as she was feeling weak and having SOB and her hb dropped to 6.7. She was told by PMD to start on PROCRIT 10,000 units M-W-F. She was told her iron levels are good and she can stop iron. \par \par Today she feels very tired and her SOB is worse. She denies any melena or BRBPR in last few days. She does have anal fissure and hemorrhoids and bleeds when she is constipated. She denies hematuria or post menopausal bleeding. Denies weight loss. Does not take any NSAIDs or aspirin. She has not followed up with nephrology before.\par Family history is significant for breast cancer in her mother. She is a former smoker stopped 20 years ago.\par She is uptodate with mammogram and Pap smear. \par  [de-identified] : 09/17/2020 Patient returns for follow up , she feels better after venofer X 4 and on EPO 10,000 weekly , Hb is up to 10 . \par \par 10/15/2020 Patient returns for follow up 2 weeks after last dose of EPO , today's Hb is 10.7 . she offers no new complaints . \par \par 04/08/2021 Patient returns for follow up for anemia on EPO and iron replacement . Hb is 10.8 . she complains of mild left arm pain after she started to self check her BP . ahe meds were recently adjusted by her PMD . \par \par 08/31/2021 patient returns for follow up , she offers no new complaints , her Hgb is slowly trending down after last transfusion and venofer X1 for ferritin : 35 . \par \par 9/28/21\par Pt returns for f/u visit . She received venofer x 3 in September , last dose was on 9/21/21 . Reports still feels very weak and tired and c/o SOB . Her cardiologist has ordered Echo , which was done last wk and results are pending . She denies any blood in stools or any hematuria . She continues to receive Retacrit . Hb today is 8.7 , not changed since last visit and after receiving venofer and retacrit .

## 2021-09-29 LAB
FERRITIN SERPL-MCNC: 96 NG/ML
IRON SATN MFR SERPL: 15 %
IRON SERPL-MCNC: 43 UG/DL
TIBC SERPL-MCNC: 286 UG/DL
UIBC SERPL-MCNC: 243 UG/DL

## 2021-10-05 ENCOUNTER — APPOINTMENT (OUTPATIENT)
Dept: INFUSION THERAPY | Facility: CLINIC | Age: 76
End: 2021-10-05

## 2021-10-06 ENCOUNTER — LABORATORY RESULT (OUTPATIENT)
Age: 76
End: 2021-10-06

## 2021-10-06 ENCOUNTER — APPOINTMENT (OUTPATIENT)
Dept: INFUSION THERAPY | Facility: CLINIC | Age: 76
End: 2021-10-06

## 2021-10-06 ENCOUNTER — OUTPATIENT (OUTPATIENT)
Dept: OUTPATIENT SERVICES | Facility: HOSPITAL | Age: 76
LOS: 1 days | Discharge: HOME | End: 2021-10-06

## 2021-10-06 DIAGNOSIS — Z87.19 PERSONAL HISTORY OF OTHER DISEASES OF THE DIGESTIVE SYSTEM: Chronic | ICD-10-CM

## 2021-10-06 DIAGNOSIS — D64.9 ANEMIA, UNSPECIFIED: ICD-10-CM

## 2021-10-06 LAB
HCT VFR BLD CALC: 27.2 %
HGB BLD-MCNC: 8.4 G/DL
MCHC RBC-ENTMCNC: 27.3 PG
MCHC RBC-ENTMCNC: 30.9 G/DL
MCV RBC AUTO: 88.3 FL
PLATELET # BLD AUTO: 101 K/UL
PMV BLD: 9.8 FL
RBC # BLD: 3.08 M/UL
RBC # FLD: 15.9 %
WBC # FLD AUTO: 3.38 K/UL

## 2021-10-06 RX ORDER — ERYTHROPOIETIN 10000 [IU]/ML
10000 INJECTION, SOLUTION INTRAVENOUS; SUBCUTANEOUS ONCE
Refills: 0 | Status: COMPLETED | OUTPATIENT
Start: 2021-10-06 | End: 2021-10-06

## 2021-10-06 RX ORDER — IRON SUCROSE 20 MG/ML
200 INJECTION, SOLUTION INTRAVENOUS ONCE
Refills: 0 | Status: DISCONTINUED | OUTPATIENT
Start: 2021-10-06 | End: 2021-10-06

## 2021-10-06 RX ADMIN — ERYTHROPOIETIN 10000 UNIT(S): 10000 INJECTION, SOLUTION INTRAVENOUS; SUBCUTANEOUS at 14:32

## 2021-10-12 ENCOUNTER — APPOINTMENT (OUTPATIENT)
Dept: INFUSION THERAPY | Facility: CLINIC | Age: 76
End: 2021-10-12

## 2021-10-12 ENCOUNTER — LABORATORY RESULT (OUTPATIENT)
Age: 76
End: 2021-10-12

## 2021-10-12 LAB
HCT VFR BLD CALC: 28.7 %
HGB BLD-MCNC: 8.8 G/DL
MCHC RBC-ENTMCNC: 27.2 PG
MCHC RBC-ENTMCNC: 30.7 G/DL
MCV RBC AUTO: 88.9 FL
PLATELET # BLD AUTO: 142 K/UL
PMV BLD: 10.3 FL
RBC # BLD: 3.23 M/UL
RBC # FLD: 15.3 %
WBC # FLD AUTO: 4.01 K/UL

## 2021-10-12 RX ORDER — ERYTHROPOIETIN 10000 [IU]/ML
10000 INJECTION, SOLUTION INTRAVENOUS; SUBCUTANEOUS ONCE
Refills: 0 | Status: COMPLETED | OUTPATIENT
Start: 2021-10-12 | End: 2021-10-12

## 2021-10-12 RX ADMIN — ERYTHROPOIETIN 10000 UNIT(S): 10000 INJECTION, SOLUTION INTRAVENOUS; SUBCUTANEOUS at 14:31

## 2021-10-19 ENCOUNTER — APPOINTMENT (OUTPATIENT)
Dept: INFUSION THERAPY | Facility: CLINIC | Age: 76
End: 2021-10-19

## 2021-10-19 ENCOUNTER — LABORATORY RESULT (OUTPATIENT)
Age: 76
End: 2021-10-19

## 2021-10-19 LAB
HCT VFR BLD CALC: 27.6 %
HGB BLD-MCNC: 8.7 G/DL
MCHC RBC-ENTMCNC: 27.2 PG
MCHC RBC-ENTMCNC: 31.5 G/DL
MCV RBC AUTO: 86.3 FL
PLATELET # BLD AUTO: 131 K/UL
PMV BLD: 10.6 FL
RBC # BLD: 3.2 M/UL
RBC # FLD: 14.9 %
WBC # FLD AUTO: 3.7 K/UL

## 2021-10-19 RX ORDER — ERYTHROPOIETIN 10000 [IU]/ML
10000 INJECTION, SOLUTION INTRAVENOUS; SUBCUTANEOUS ONCE
Refills: 0 | Status: COMPLETED | OUTPATIENT
Start: 2021-10-19 | End: 2021-10-19

## 2021-10-19 RX ADMIN — ERYTHROPOIETIN 10000 UNIT(S): 10000 INJECTION, SOLUTION INTRAVENOUS; SUBCUTANEOUS at 14:47

## 2021-10-26 ENCOUNTER — APPOINTMENT (OUTPATIENT)
Dept: INFUSION THERAPY | Facility: CLINIC | Age: 76
End: 2021-10-26

## 2021-10-26 ENCOUNTER — APPOINTMENT (OUTPATIENT)
Dept: HEMATOLOGY ONCOLOGY | Facility: CLINIC | Age: 76
End: 2021-10-26

## 2021-10-28 ENCOUNTER — APPOINTMENT (OUTPATIENT)
Dept: INFUSION THERAPY | Facility: CLINIC | Age: 76
End: 2021-10-28
Payer: MEDICARE

## 2021-10-28 ENCOUNTER — APPOINTMENT (OUTPATIENT)
Dept: HEMATOLOGY ONCOLOGY | Facility: CLINIC | Age: 76
End: 2021-10-28
Payer: MEDICARE

## 2021-10-28 ENCOUNTER — LABORATORY RESULT (OUTPATIENT)
Age: 76
End: 2021-10-28

## 2021-10-28 VITALS
SYSTOLIC BLOOD PRESSURE: 133 MMHG | BODY MASS INDEX: 40.5 KG/M2 | DIASTOLIC BLOOD PRESSURE: 66 MMHG | TEMPERATURE: 98.6 F | WEIGHT: 252 LBS | RESPIRATION RATE: 16 BRPM | HEIGHT: 66 IN | HEART RATE: 64 BPM

## 2021-10-28 PROCEDURE — 99213 OFFICE O/P EST LOW 20 MIN: CPT

## 2021-10-28 RX ORDER — ERYTHROPOIETIN 10000 [IU]/ML
10000 INJECTION, SOLUTION INTRAVENOUS; SUBCUTANEOUS ONCE
Refills: 0 | Status: COMPLETED | OUTPATIENT
Start: 2021-10-28 | End: 2021-10-28

## 2021-10-28 RX ADMIN — ERYTHROPOIETIN 10000 UNIT(S): 10000 INJECTION, SOLUTION INTRAVENOUS; SUBCUTANEOUS at 15:28

## 2021-10-29 LAB
HCT VFR BLD CALC: 25.9 %
HGB BLD-MCNC: 8 G/DL
MCHC RBC-ENTMCNC: 26.4 PG
MCHC RBC-ENTMCNC: 30.9 G/DL
MCV RBC AUTO: 85.5 FL
PLATELET # BLD AUTO: 93 K/UL
PMV BLD: 9.7 FL
RBC # BLD: 3.03 M/UL
RBC # FLD: 14.8 %
WBC # FLD AUTO: 3.63 K/UL

## 2021-10-30 NOTE — ASSESSMENT
[FreeTextEntry1] : 75 year old female presents with symptomatic normocytic anemia secondary to chronic kidney disease and iron deficiency, on  EPO and Venofer- rule out obscure GI bleeding \par Received Venofer x 3 last dose was on 9/21\par \par Plan: \par -- Repeat CBC today shows Hgb 8.0- dropped since last week\par -- Follow up iron studies- pt will need more infusions of venofer\par -- Will continue with EPO weekly- if iron studies normal will consider increasing Retacrit \par -- RTC in 4 wks \par \par Patient seen and examined with Dr. Gaston who agrees with plan of care.\par \par

## 2021-10-30 NOTE — HISTORY OF PRESENT ILLNESS
[de-identified] : bruce is a 74 year old white female with hypertension, chronic kidney disease, morbidly obese presents today with normocytic anemia. \par Her most recent CBC 04/01/2020 shows WBC -5.66, HB of 6.7, MCV -86.3, RDW - 17.3, platelet count- 196. Her Hb was normal until 08/2019. In October 2019 she noticed black tarry stools and was feeling tired. She went to ER and her Hb was 5.3. She was given 3 units of PRBC. Her iron studies at that showed ferritin of 8 and percent saturation of 5. She had EGD and colonoscopy which did not reveal any bleeding lesions. Following that event as per patient she was not given iron (?not sure why). She was readmitted to hospital in 01/2020 for SOB on exertion and her HB was noted to 6.0 again. She was given 2 units and her iron studies were consistent with DARRIN. B12 and folate were normal. Immunofixation showed weak IgG lambda migrating para protein. Free light chain ratio 2.1. Normal calcium. \par EGD and VCE was done. EGD revealed gastritis and VCE showed bleeding in small bowel. \par She was started on oral iron and she took for about three months. \par Her latest ferritin done in feb 2020 was 24 and percent saturation was 72% and her hb improved to 8.8. She also has push enteroscopy in 02/2020 and no bleeding lesions were found. Was recommended to have repeat colonoscopy and double balloon enteroscopy if she bleeds again.\par She went for blood work again on 04/01/2020 as she was feeling weak and having SOB and her hb dropped to 6.7. She was told by PMD to start on PROCRIT 10,000 units M-W-F. She was told her iron levels are good and she can stop iron. \par \par Today she feels very tired and her SOB is worse. She denies any melena or BRBPR in last few days. She does have anal fissure and hemorrhoids and bleeds when she is constipated. She denies hematuria or post menopausal bleeding. Denies weight loss. Does not take any NSAIDs or aspirin. She has not followed up with nephrology before.\par Family history is significant for breast cancer in her mother. She is a former smoker stopped 20 years ago.\par She is uptodate with mammogram and Pap smear. \par  [de-identified] : 09/17/2020 Patient returns for follow up , she feels better after venofer X 4 and on EPO 10,000 weekly , Hb is up to 10 . \par \par 10/15/2020 Patient returns for follow up 2 weeks after last dose of EPO , today's Hb is 10.7 . she offers no new complaints . \par \par 04/08/2021 Patient returns for follow up for anemia on EPO and iron replacement . Hb is 10.8 . she complains of mild left arm pain after she started to self check her BP . ahe meds were recently adjusted by her PMD . \par \par 08/31/2021 patient returns for follow up , she offers no new complaints , her Hgb is slowly trending down after last transfusion and venofer X1 for ferritin : 35 . \par \par 9/28/21\par Pt returns for f/u visit . She received venofer x 3 in September , last dose was on 9/21/21 . Reports still feels very weak and tired and c/o SOB . Her cardiologist has ordered Echo , which was done last wk and results are pending . She denies any blood in stools or any hematuria . She continues to receive Retacrit . Hb today is 8.7 , not changed since last visit and after receiving venofer and retacrit . \par \par 10/28/2021: LATRICIA is here for follow up visit for symptomatic normocytic anemia. S/p Venofer last dose 9/21 and EPO weekly. She continues to have SOB on exertion. She denies any overt bleeding at this time. Hgb noted to be 8.0. \par

## 2021-10-30 NOTE — REVIEW OF SYSTEMS
[Fatigue] : fatigue [SOB on Exertion] : shortness of breath during exertion [Negative] : Heme/Lymph [Lower Ext Edema] : no lower extremity edema [FreeTextEntry5] : SOB on exertion

## 2021-11-02 ENCOUNTER — LABORATORY RESULT (OUTPATIENT)
Age: 76
End: 2021-11-02

## 2021-11-02 ENCOUNTER — APPOINTMENT (OUTPATIENT)
Dept: INFUSION THERAPY | Facility: CLINIC | Age: 76
End: 2021-11-02

## 2021-11-02 LAB
HCT VFR BLD CALC: 24.8 %
HGB BLD-MCNC: 7.5 G/DL
MCHC RBC-ENTMCNC: 26.2 PG
MCHC RBC-ENTMCNC: 30.2 G/DL
MCV RBC AUTO: 86.7 FL
PLATELET # BLD AUTO: 125 K/UL
PMV BLD: 10.5 FL
RBC # BLD: 2.86 M/UL
RBC # FLD: 15.2 %
WBC # FLD AUTO: 3.64 K/UL

## 2021-11-02 RX ORDER — IRON SUCROSE 20 MG/ML
200 INJECTION, SOLUTION INTRAVENOUS ONCE
Refills: 0 | Status: COMPLETED | OUTPATIENT
Start: 2021-11-02 | End: 2021-11-02

## 2021-11-02 RX ORDER — ACETAMINOPHEN 500 MG
650 TABLET ORAL ONCE
Refills: 0 | Status: COMPLETED | OUTPATIENT
Start: 2021-11-02 | End: 2021-11-02

## 2021-11-02 RX ORDER — HYDROCORTISONE 20 MG
100 TABLET ORAL ONCE
Refills: 0 | Status: COMPLETED | OUTPATIENT
Start: 2021-11-02 | End: 2021-11-02

## 2021-11-02 RX ADMIN — Medication 100 MILLIGRAM(S): at 15:30

## 2021-11-02 RX ADMIN — IRON SUCROSE 260 MILLIGRAM(S): 20 INJECTION, SOLUTION INTRAVENOUS at 15:30

## 2021-11-02 RX ADMIN — Medication 650 MILLIGRAM(S): at 15:09

## 2021-11-02 RX ADMIN — Medication 100 MILLIGRAM(S): at 15:09

## 2021-11-03 LAB
ABO + RH PNL BLD: NORMAL
BLD GP AB SCN SERPL QL: NORMAL

## 2021-11-04 ENCOUNTER — APPOINTMENT (OUTPATIENT)
Dept: INFUSION THERAPY | Facility: CLINIC | Age: 76
End: 2021-11-04

## 2021-11-04 ENCOUNTER — LABORATORY RESULT (OUTPATIENT)
Age: 76
End: 2021-11-04

## 2021-11-04 RX ORDER — ERYTHROPOIETIN 10000 [IU]/ML
10000 INJECTION, SOLUTION INTRAVENOUS; SUBCUTANEOUS ONCE
Refills: 0 | Status: COMPLETED | OUTPATIENT
Start: 2021-11-04 | End: 2021-11-04

## 2021-11-04 RX ADMIN — ERYTHROPOIETIN 10000 UNIT(S): 10000 INJECTION, SOLUTION INTRAVENOUS; SUBCUTANEOUS at 16:29

## 2021-11-05 LAB
HCT VFR BLD CALC: 24.2 %
HGB BLD-MCNC: 7.3 G/DL
MCHC RBC-ENTMCNC: 26.3 PG
MCHC RBC-ENTMCNC: 30.2 G/DL
MCV RBC AUTO: 87.1 FL
PLATELET # BLD AUTO: 133 K/UL
PMV BLD: 10.6 FL
RBC # BLD: 2.78 M/UL
RBC # FLD: 15.4 %
WBC # FLD AUTO: 5.54 K/UL

## 2021-11-10 ENCOUNTER — NON-APPOINTMENT (OUTPATIENT)
Age: 76
End: 2021-11-10

## 2021-11-10 ENCOUNTER — LABORATORY RESULT (OUTPATIENT)
Age: 76
End: 2021-11-10

## 2021-11-10 ENCOUNTER — APPOINTMENT (OUTPATIENT)
Dept: INFUSION THERAPY | Facility: CLINIC | Age: 76
End: 2021-11-10

## 2021-11-10 LAB
HCT VFR BLD CALC: 26.2 %
HGB BLD-MCNC: 7.9 G/DL
MCHC RBC-ENTMCNC: 26.6 PG
MCHC RBC-ENTMCNC: 30.2 G/DL
MCV RBC AUTO: 88.2 FL
PLATELET # BLD AUTO: 122 K/UL
PMV BLD: 9.3 FL
RBC # BLD: 2.97 M/UL
RBC # FLD: 17.2 %
WBC # FLD AUTO: 3.94 K/UL

## 2021-11-10 RX ORDER — ERYTHROPOIETIN 10000 [IU]/ML
10000 INJECTION, SOLUTION INTRAVENOUS; SUBCUTANEOUS ONCE
Refills: 0 | Status: COMPLETED | OUTPATIENT
Start: 2021-11-10 | End: 2021-11-10

## 2021-11-10 RX ADMIN — ERYTHROPOIETIN 10000 UNIT(S): 10000 INJECTION, SOLUTION INTRAVENOUS; SUBCUTANEOUS at 12:45

## 2021-11-12 ENCOUNTER — RESULT REVIEW (OUTPATIENT)
Age: 76
End: 2021-11-12

## 2021-11-12 ENCOUNTER — OUTPATIENT (OUTPATIENT)
Dept: OUTPATIENT SERVICES | Facility: HOSPITAL | Age: 76
LOS: 1 days | Discharge: HOME | End: 2021-11-12
Payer: MEDICARE

## 2021-11-12 DIAGNOSIS — Z87.19 PERSONAL HISTORY OF OTHER DISEASES OF THE DIGESTIVE SYSTEM: Chronic | ICD-10-CM

## 2021-11-12 PROCEDURE — 93970 EXTREMITY STUDY: CPT | Mod: 26

## 2021-11-16 ENCOUNTER — APPOINTMENT (OUTPATIENT)
Dept: INFUSION THERAPY | Facility: CLINIC | Age: 76
End: 2021-11-16

## 2021-11-16 ENCOUNTER — APPOINTMENT (OUTPATIENT)
Dept: HEMATOLOGY ONCOLOGY | Facility: CLINIC | Age: 76
End: 2021-11-16

## 2021-11-17 DIAGNOSIS — M79.89 OTHER SPECIFIED SOFT TISSUE DISORDERS: ICD-10-CM

## 2021-11-18 ENCOUNTER — EMERGENCY (EMERGENCY)
Facility: HOSPITAL | Age: 76
LOS: 0 days | Discharge: HOME | End: 2021-11-19
Attending: EMERGENCY MEDICINE | Admitting: STUDENT IN AN ORGANIZED HEALTH CARE EDUCATION/TRAINING PROGRAM
Payer: MEDICARE

## 2021-11-18 ENCOUNTER — LABORATORY RESULT (OUTPATIENT)
Age: 76
End: 2021-11-18

## 2021-11-18 ENCOUNTER — APPOINTMENT (OUTPATIENT)
Dept: INFUSION THERAPY | Facility: CLINIC | Age: 76
End: 2021-11-18
Payer: MEDICARE

## 2021-11-18 ENCOUNTER — APPOINTMENT (OUTPATIENT)
Dept: HEMATOLOGY ONCOLOGY | Facility: CLINIC | Age: 76
End: 2021-11-18
Payer: MEDICARE

## 2021-11-18 VITALS
DIASTOLIC BLOOD PRESSURE: 70 MMHG | OXYGEN SATURATION: 98 % | TEMPERATURE: 96 F | HEART RATE: 62 BPM | SYSTOLIC BLOOD PRESSURE: 161 MMHG | RESPIRATION RATE: 18 BRPM

## 2021-11-18 VITALS
BODY MASS INDEX: 40.5 KG/M2 | TEMPERATURE: 97.3 F | OXYGEN SATURATION: 99 % | HEIGHT: 66 IN | RESPIRATION RATE: 16 BRPM | HEART RATE: 66 BPM | DIASTOLIC BLOOD PRESSURE: 62 MMHG | SYSTOLIC BLOOD PRESSURE: 142 MMHG | WEIGHT: 252 LBS

## 2021-11-18 VITALS
HEART RATE: 73 BPM | RESPIRATION RATE: 18 BRPM | OXYGEN SATURATION: 98 % | HEIGHT: 67 IN | TEMPERATURE: 98 F | WEIGHT: 250 LBS | DIASTOLIC BLOOD PRESSURE: 58 MMHG | SYSTOLIC BLOOD PRESSURE: 142 MMHG

## 2021-11-18 DIAGNOSIS — D64.9 ANEMIA, UNSPECIFIED: ICD-10-CM

## 2021-11-18 DIAGNOSIS — N18.9 CHRONIC KIDNEY DISEASE, UNSPECIFIED: ICD-10-CM

## 2021-11-18 DIAGNOSIS — I12.9 HYPERTENSIVE CHRONIC KIDNEY DISEASE WITH STAGE 1 THROUGH STAGE 4 CHRONIC KIDNEY DISEASE, OR UNSPECIFIED CHRONIC KIDNEY DISEASE: ICD-10-CM

## 2021-11-18 DIAGNOSIS — Z88.0 ALLERGY STATUS TO PENICILLIN: ICD-10-CM

## 2021-11-18 DIAGNOSIS — Z87.19 PERSONAL HISTORY OF OTHER DISEASES OF THE DIGESTIVE SYSTEM: Chronic | ICD-10-CM

## 2021-11-18 DIAGNOSIS — R79.89 OTHER SPECIFIED ABNORMAL FINDINGS OF BLOOD CHEMISTRY: ICD-10-CM

## 2021-11-18 DIAGNOSIS — Z91.040 LATEX ALLERGY STATUS: ICD-10-CM

## 2021-11-18 LAB
ALBUMIN SERPL ELPH-MCNC: 3.8 G/DL — SIGNIFICANT CHANGE UP (ref 3.5–5.2)
ALP SERPL-CCNC: 89 U/L — SIGNIFICANT CHANGE UP (ref 30–115)
ALT FLD-CCNC: 7 U/L — SIGNIFICANT CHANGE UP (ref 0–41)
ANION GAP SERPL CALC-SCNC: 12 MMOL/L — SIGNIFICANT CHANGE UP (ref 7–14)
AST SERPL-CCNC: 16 U/L — SIGNIFICANT CHANGE UP (ref 0–41)
BASOPHILS # BLD AUTO: 0.02 K/UL — SIGNIFICANT CHANGE UP (ref 0–0.2)
BASOPHILS NFR BLD AUTO: 0.6 % — SIGNIFICANT CHANGE UP (ref 0–1)
BILIRUB SERPL-MCNC: 0.5 MG/DL — SIGNIFICANT CHANGE UP (ref 0.2–1.2)
BLD GP AB SCN SERPL QL: SIGNIFICANT CHANGE UP
BUN SERPL-MCNC: 34 MG/DL — HIGH (ref 10–20)
CALCIUM SERPL-MCNC: 8.4 MG/DL — LOW (ref 8.5–10.1)
CHLORIDE SERPL-SCNC: 113 MMOL/L — HIGH (ref 98–110)
CO2 SERPL-SCNC: 15 MMOL/L — LOW (ref 17–32)
CREAT SERPL-MCNC: 1.9 MG/DL — HIGH (ref 0.7–1.5)
EOSINOPHIL # BLD AUTO: 0.21 K/UL — SIGNIFICANT CHANGE UP (ref 0–0.7)
EOSINOPHIL NFR BLD AUTO: 6 % — SIGNIFICANT CHANGE UP (ref 0–8)
GLUCOSE SERPL-MCNC: 81 MG/DL — SIGNIFICANT CHANGE UP (ref 70–99)
HCT VFR BLD CALC: 24 %
HCT VFR BLD CALC: 24.2 % — LOW (ref 37–47)
HGB BLD-MCNC: 7.1 G/DL
HGB BLD-MCNC: 7.1 G/DL — LOW (ref 12–16)
IMM GRANULOCYTES NFR BLD AUTO: 0.6 % — HIGH (ref 0.1–0.3)
LYMPHOCYTES # BLD AUTO: 0.83 K/UL — LOW (ref 1.2–3.4)
LYMPHOCYTES # BLD AUTO: 23.7 % — SIGNIFICANT CHANGE UP (ref 20.5–51.1)
MCHC RBC-ENTMCNC: 26.1 PG — LOW (ref 27–31)
MCHC RBC-ENTMCNC: 26.3 PG
MCHC RBC-ENTMCNC: 29.3 G/DL — LOW (ref 32–37)
MCHC RBC-ENTMCNC: 29.6 G/DL
MCV RBC AUTO: 88.9 FL
MCV RBC AUTO: 89 FL — SIGNIFICANT CHANGE UP (ref 81–99)
MONOCYTES # BLD AUTO: 0.45 K/UL — SIGNIFICANT CHANGE UP (ref 0.1–0.6)
MONOCYTES NFR BLD AUTO: 12.9 % — HIGH (ref 1.7–9.3)
NEUTROPHILS # BLD AUTO: 1.97 K/UL — SIGNIFICANT CHANGE UP (ref 1.4–6.5)
NEUTROPHILS NFR BLD AUTO: 56.2 % — SIGNIFICANT CHANGE UP (ref 42.2–75.2)
NRBC # BLD: 0 /100 WBCS — SIGNIFICANT CHANGE UP (ref 0–0)
PLATELET # BLD AUTO: 125 K/UL
PLATELET # BLD AUTO: 139 K/UL — SIGNIFICANT CHANGE UP (ref 130–400)
PMV BLD: 10 FL
POTASSIUM SERPL-MCNC: 5.5 MMOL/L — HIGH (ref 3.5–5)
POTASSIUM SERPL-SCNC: 5.5 MMOL/L — HIGH (ref 3.5–5)
PROT SERPL-MCNC: 6.8 G/DL — SIGNIFICANT CHANGE UP (ref 6–8)
RBC # BLD: 2.7 M/UL
RBC # BLD: 2.72 M/UL — LOW (ref 4.2–5.4)
RBC # FLD: 16.7 % — HIGH (ref 11.5–14.5)
RBC # FLD: 17 %
SODIUM SERPL-SCNC: 140 MMOL/L — SIGNIFICANT CHANGE UP (ref 135–146)
TROPONIN T SERPL-MCNC: <0.01 NG/ML — SIGNIFICANT CHANGE UP
WBC # BLD: 3.5 K/UL — LOW (ref 4.8–10.8)
WBC # FLD AUTO: 2.98 K/UL
WBC # FLD AUTO: 3.5 K/UL — LOW (ref 4.8–10.8)

## 2021-11-18 PROCEDURE — 93010 ELECTROCARDIOGRAM REPORT: CPT

## 2021-11-18 PROCEDURE — 99214 OFFICE O/P EST MOD 30 MIN: CPT

## 2021-11-18 PROCEDURE — 71045 X-RAY EXAM CHEST 1 VIEW: CPT | Mod: 26

## 2021-11-18 PROCEDURE — 99236 HOSP IP/OBS SAME DATE HI 85: CPT | Mod: 25

## 2021-11-18 RX ORDER — ERYTHROPOIETIN 10000 [IU]/ML
20000 INJECTION, SOLUTION INTRAVENOUS; SUBCUTANEOUS ONCE
Refills: 0 | Status: COMPLETED | OUTPATIENT
Start: 2021-11-18 | End: 2021-11-18

## 2021-11-18 RX ORDER — DIPHENHYDRAMINE HCL 50 MG
50 CAPSULE ORAL ONCE
Refills: 0 | Status: COMPLETED | OUTPATIENT
Start: 2021-11-18 | End: 2021-11-18

## 2021-11-18 RX ADMIN — ERYTHROPOIETIN 20000 UNIT(S): 10000 INJECTION, SOLUTION INTRAVENOUS; SUBCUTANEOUS at 14:04

## 2021-11-18 NOTE — ED PROVIDER NOTE - NS_ ATTENDINGSCRIBEDETAILS _ED_A_ED_FT
I have personally evaluated this patient. I have seen this patient with a medical scribe, please see [email or web address] for my contribution to care. I have reviewed scribe notes which are accurate.

## 2021-11-18 NOTE — ED CDU PROVIDER DISPOSITION NOTE - CARE PROVIDERS DIRECT ADDRESSES
[email or web address]

## 2021-11-18 NOTE — ED CDU PROVIDER INITIAL DAY NOTE - PROGRESS NOTE DETAILS
received signout from Rajendra Payne - pt in obs for transfusion of blood; (sent in by Dr. Gaston) hx ckd cr currently 1.9; had colonoscopy/egd nl as per pt; will continue to follow; received signout from Rajendra Payne - blood infusing on my arrival; pt in obs for transfusion of blood; (sent in by Dr. Gaston) hx ckd cr currently 1.9; had colonoscopy/egd nl as per pt; will continue to follow; pt feeling better; completed 2 unit with out issues; will follow up with Dr. slaughter and gi dr. rincon outpt

## 2021-11-18 NOTE — ED CDU PROVIDER DISPOSITION NOTE - NSFOLLOWUPINSTRUCTIONS_ED_ALL_ED_FT
Anemia    Anemia is a condition in which the concentration of red blood cells or hemoglobin in the blood is below normal. Hemoglobin is a substance in red blood cells that carries oxygen to the tissues of the body. Anemia results in not enough oxygen reaching these tissues which can cause symptoms such as weakness, dizziness/lightheadedness, shortness of breath, chest pain, paleness, or nausea.    SEEK IMMEDIATE MEDICAL CARE IF YOU HAVE THE FOLLOWING SYMPTOMS: extreme weakness/chest pain/shortness of breath, black or bloody stools, vomiting blood, fainting, fever, or any signs of dehydration.    Blood Transfusion, Adult, Care After  This sheet gives you information about how to care for yourself after your procedure. Your doctor may also give you more specific instructions. If you have problems or questions, contact your doctor.    Follow these instructions at home:  Image   Take over-the-counter and prescription medicines only as told by your doctor.  Go back to your normal activities as told by your doctor.  Follow instructions from your doctor about how to take care of the area where an IV tube was put into your vein (insertion site). Make sure you:  Wash your hands with soap and water before you change your bandage (dressing). If there is no soap and water, use hand .  Change your bandage as told by your doctor.  Check your IV insertion site every day for signs of infection. Check for:  More redness, swelling, or pain.  More fluid or blood.  Warmth.  Pus or a bad smell.  Contact a doctor if:  You have more redness, swelling, or pain around the IV insertion site..  You have more fluid or blood coming from the IV insertion site.  Your IV insertion site feels warm to the touch.  You have pus or a bad smell coming from the IV insertion site.  Your pee (urine) turns pink, red, or brown.  You feel weak after doing your normal activities.  Get help right away if:  You have signs of a serious allergic or body defense (immune) system reaction, including:  Itchiness.  Hives.  Trouble breathing.  Anxiety.  Pain in your chest or lower back.  Fever, flushing, and chills.  Fast pulse.  Rash.  Watery poop (diarrhea).  Throwing up (vomiting).  Dark pee.  Serious headache.  Dizziness.  Stiff neck.  Yellow color in your face or the white parts of your eyes (jaundice).  Summary  After a blood transfusion, return to your normal activities as told by your doctor.  Every day, check for signs of infection where the IV tube was put into your vein.  Some signs of infection are warm skin, more redness and pain, more fluid or blood, and pus or a bad smell where the needle went in.  Contact your doctor if you feel weak or have any unusual symptoms.  This information is not intended to replace advice given to you by your health care provider. Make sure you discuss any questions you have with your health care provider.

## 2021-11-18 NOTE — ED CDU PROVIDER DISPOSITION NOTE - CARE PROVIDER_API CALL
Liu Gaston)  Internal Medicine; Medical Oncology  [address] [place] [address]  Phone: [phone]  Fax: [phone]  Follow Up Time:     Gildardo Bill  [address]  [address] [place] [address]  Phone: [phone]  Fax: [phone]  Established Patient  Follow Up Time: 1-3 Days

## 2021-11-18 NOTE — ED CDU PROVIDER DISPOSITION NOTE - PATIENT PORTAL LINK FT
You can access the FollowMyHealth Patient Portal offered by [facility] by registering at the following website: [email or web address]. By joining [employer]’s FollowMyHealth portal, you will also be able to view your health information using other applications (apps) compatible with our system.

## 2021-11-18 NOTE — CONSULT LETTER
[Dear  ___] : Dear  [unfilled], [Courtesy Letter:] : I had the pleasure of seeing your patient, [unfilled], in my office today. [DrHerman  ___] : Dr. MOORE

## 2021-11-18 NOTE — ED PROVIDER NOTE - OBJECTIVE STATEMENT
76 year old female with htn, anemia, sent in by dr slaughter for hb of 7.1 Pt is on iron infusions, had GI bleed work out with  negative colonoscopy and endoscopy. pt admits to weakness and sob. no bleeding, fever, chills, chest pain, abd pain, or nausea, vomiting, diarrhea.

## 2021-11-18 NOTE — ED CDU PROVIDER INITIAL DAY NOTE - ATTENDING CONTRIBUTION TO CARE
Pt sent in by her hematologist for management of her chronic anemia. Pt placement in EDOU for blood transfusion. Will transfuse and dispo accordingly.

## 2021-11-18 NOTE — ED CDU PROVIDER INITIAL DAY NOTE - OBJECTIVE STATEMENT
76 yold female to Ed Pmhx Htn, Ckd, anemia sent in to Ed for transfusion - pt with Hx of anemia - seen by Gi Dr. De La Rosa egd/colonoscopy done ~2 yrs ago - no specific site found; pt receiveing blood, iron infusions and procrit at [facility]; last transfusion 2 weeks ago  - pt admits to feeling weak, sob with exertion; denies melena, brbpr, fever, chills,cough, n/v or back pain

## 2021-11-18 NOTE — ED CDU PROVIDER DISPOSITION NOTE - PROVIDER TOKENS
PROVIDER:[TOKEN:[[id number]]],PROVIDER:[TOKEN:[[id number]],FOLLOWUP:[1-3 Days],ESTABLISHEDPATIENT:[T]]

## 2021-11-18 NOTE — ED CDU PROVIDER DISPOSITION NOTE - CLINICAL COURSE
Pt sent in by her hematologist for management of her chronic anemia. Pt placement in EDOU for blood transfusion. Required 2 units prbCs which she tolerated well. Will dc with outpt f/up.

## 2021-11-18 NOTE — ED ADULT NURSE NOTE - OBJECTIVE STATEMENT
Pt sent from the [facility] for possible blood transfusion. Hgb 7.1. Pt c/o intermittent dizziness, sob, weakness, denies fever.

## 2021-11-18 NOTE — ED CDU PROVIDER INITIAL DAY NOTE - NS ED ROS FT
Constitutional: No fever, chills.  Eyes: No visual changes.  ENT: No hearing changes.  Neck: No neck pain or stiffness.  Cardiovascular: No chest pain, palpitations, edema.  Pulmonary: No SOB, cough. No hemoptysis.  Abdominal:  No nausea, vomiting, diarrhea.  : No dysuria, frequency.  Neuro: weakness, fatigue/ exertional dyspnea  MS: No back pain. No calf pain/swelling.  Psych: No suicidal ideations.

## 2021-11-18 NOTE — ED CDU PROVIDER INITIAL DAY NOTE - PHYSICAL EXAMINATION
Constitutional: Well developed, well nourished. NAD  Head: Normocephalic, atraumatic.  Eyes: PERRL, EOMI.  ENT: No nasal discharge. Mucous membranes dry.  Neck: Supple. Painless ROM.  Cardiovascular: Normal S1, S2. Regular rate and rhythm.  .  Pulmonary:   Lungs clear to auscultation bilaterally.  .  Abdominal: Soft. Nondistended. No rebound, guarding, rigidity.  Extremities. Pelvis stable. No lower extremity edema, symmetric calves.  Skin: No rashes, cyanosis.  Neuro: AAOx3. No focal neurological deficits.  Psych: Normal mood. Normal affect.

## 2021-11-18 NOTE — ED PROVIDER NOTE - CLINICAL SUMMARY MEDICAL DECISION MAKING FREE TEXT BOX
ATTENDING NOTE:   77 yo F PMH rheumatic fever and heart murmur sent in by Dr. Sanders for Hgb 7.1 Pt denies any bleeding. Has been worked up for GI bleeds in the past which were negative. Reports SOB and weakness.  Well appearing, NAD, non toxic. NCAT PERRLA EOMI neck supple non tender normal wob cta bl rrr abdomen s nt nd no rebound no guarding WWPx4 neuro non focal   PLAN: Labs. Transfusion. F/u with Dr. Sanders

## 2021-11-19 NOTE — PHYSICAL EXAM
[Normal] : clear to auscultation bilaterally, no dullness, no wheezing [de-identified] : pale in no acute distress.  [de-identified] : grade 3/6 systolic murmur  [de-identified] : lower extremity edema ( R > L )

## 2021-11-19 NOTE — ASSESSMENT
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 ) , Hgb is 7.1 ,  two  weeks after transfusion and despite EPO and iron infusions . \par peripheral edema . \par Plan : refer to ED for transfusions with diuresis .\par          continue venofer as needed , increase EPO to 20,000 weekly . \par          needs GI follow up .

## 2021-11-19 NOTE — HISTORY OF PRESENT ILLNESS
[de-identified] : bruce is a 74 year old white female with hypertension, chronic kidney disease, morbidly obese presents today with normocytic anemia. \par Her most recent CBC 04/01/2020 shows WBC -5.66, HB of 6.7, MCV -86.3, RDW - 17.3, platelet count- 196. Her Hb was normal until 08/2019. In October 2019 she noticed black tarry stools and was feeling tired. She went to ER and her Hb was 5.3. She was given 3 units of PRBC. Her iron studies at that showed ferritin of 8 and percent saturation of 5. She had EGD and colonoscopy which did not reveal any bleeding lesions. Following that event as per patient she was not given iron (?not sure why). She was readmitted to hospital in 01/2020 for SOB on exertion and her HB was noted to 6.0 again. She was given 2 units and her iron studies were consistent with DARRIN. B12 and folate were normal. Immunofixation showed weak IgG lambda migrating para protein. Free light chain ratio 2.1. Normal calcium. \par EGD and VCE was done. EGD revealed gastritis and VCE showed bleeding in small bowel. \par She was started on oral iron and she took for about three months. \par Her latest ferritin done in feb 2020 was 24 and percent saturation was 72% and her hb improved to 8.8. She also has push enteroscopy in 02/2020 and no bleeding lesions were found. Was recommended to have repeat colonoscopy and double balloon enteroscopy if she bleeds again.\par She went for blood work again on 04/01/2020 as she was feeling weak and having SOB and her hb dropped to 6.7. She was told by PMD to start on PROCRIT 10,000 units M-W-F. She was told her iron levels are good and she can stop iron. \par \par Today she feels very tired and her SOB is worse. She denies any melena or BRBPR in last few days. She does have anal fissure and hemorrhoids and bleeds when she is constipated. She denies hematuria or post menopausal bleeding. Denies weight loss. Does not take any NSAIDs or aspirin. She has not followed up with nephrology before.\par Family history is significant for breast cancer in her mother. She is a former smoker stopped 20 years ago.\par She is uptodate with mammogram and Pap smear. \par  [de-identified] : 09/17/2020 Patient returns for follow up , she feels better after venofer X 4 and on EPO 10,000 weekly , Hb is up to 10 . \par \par 10/15/2020 Patient returns for follow up 2 weeks after last dose of EPO , today's Hb is 10.7 . she offers no new complaints . \par \par 04/08/2021 Patient returns for follow up for anemia on EPO and iron replacement . Hb is 10.8 . she complains of mild left arm pain after she started to self check her BP . ahe meds were recently adjusted by her PMD . \par \par 08/31/2021 patient returns for follow up , she offers no new complaints , her Hgb is slowly trending down after last transfusion and venofer X1 for ferritin : 35 . \par \par 11/18/2021 patient returns for follow up complaining of weakness and dyspnea on minimal exertion , still with lower extremity edema R > L . Hgb is 7.1 2 weeks after transfusion and despite venofer and EPO . She denies melena or hematochezia . Of note she had suspected small bowel bleeding on enteroscopy or capsule endoscopy and was intolerant to push enteroscopy ( hypotension ? )

## 2021-11-22 ENCOUNTER — APPOINTMENT (OUTPATIENT)
Dept: INFUSION THERAPY | Facility: CLINIC | Age: 76
End: 2021-11-22

## 2021-11-24 ENCOUNTER — LABORATORY RESULT (OUTPATIENT)
Age: 76
End: 2021-11-24

## 2021-11-24 ENCOUNTER — APPOINTMENT (OUTPATIENT)
Dept: HEMATOLOGY ONCOLOGY | Facility: CLINIC | Age: 76
End: 2021-11-24

## 2021-11-24 LAB
HCT VFR BLD CALC: 30.8 %
HGB BLD-MCNC: 9.4 G/DL
MCHC RBC-ENTMCNC: 26.9 PG
MCHC RBC-ENTMCNC: 30.5 G/DL
MCV RBC AUTO: 88 FL
PLATELET # BLD AUTO: 115 K/UL
PMV BLD: 9.5 FL
RBC # BLD: 3.5 M/UL
RBC # FLD: 16.6 %
WBC # FLD AUTO: 4.23 K/UL

## 2021-11-26 ENCOUNTER — APPOINTMENT (OUTPATIENT)
Dept: INFUSION THERAPY | Facility: CLINIC | Age: 76
End: 2021-11-26

## 2021-11-26 LAB — FERRITIN SERPL-MCNC: 26 NG/ML

## 2021-11-26 RX ORDER — ERYTHROPOIETIN 10000 [IU]/ML
20000 INJECTION, SOLUTION INTRAVENOUS; SUBCUTANEOUS ONCE
Refills: 0 | Status: COMPLETED | OUTPATIENT
Start: 2021-11-26 | End: 2021-11-26

## 2021-11-26 RX ADMIN — ERYTHROPOIETIN 20000 UNIT(S): 10000 INJECTION, SOLUTION INTRAVENOUS; SUBCUTANEOUS at 14:32

## 2021-12-01 ENCOUNTER — APPOINTMENT (OUTPATIENT)
Dept: CARDIOLOGY | Facility: CLINIC | Age: 76
End: 2021-12-01
Payer: MEDICARE

## 2021-12-01 ENCOUNTER — APPOINTMENT (OUTPATIENT)
Dept: HEMATOLOGY ONCOLOGY | Facility: CLINIC | Age: 76
End: 2021-12-01

## 2021-12-01 ENCOUNTER — RESULT CHARGE (OUTPATIENT)
Age: 76
End: 2021-12-01

## 2021-12-01 VITALS
BODY MASS INDEX: 40.82 KG/M2 | SYSTOLIC BLOOD PRESSURE: 136 MMHG | HEART RATE: 69 BPM | WEIGHT: 254 LBS | TEMPERATURE: 97.3 F | HEIGHT: 66 IN | DIASTOLIC BLOOD PRESSURE: 62 MMHG

## 2021-12-01 PROCEDURE — 99214 OFFICE O/P EST MOD 30 MIN: CPT

## 2021-12-01 PROCEDURE — 93000 ELECTROCARDIOGRAM COMPLETE: CPT

## 2021-12-01 RX ORDER — METOPROLOL TARTRATE 25 MG/1
25 TABLET, FILM COATED ORAL
Qty: 60 | Refills: 0 | Status: ACTIVE | COMMUNITY

## 2021-12-01 NOTE — PHYSICAL EXAM
[Well Developed] : well developed [Well Nourished] : well nourished [No Acute Distress] : no acute distress [Normal Venous Pressure] : normal venous pressure [No Carotid Bruit] : no carotid bruit [Normal S1, S2] : normal S1, S2 [No Murmur] : no murmur [No Rub] : no rub [No Gallop] : no gallop [Clear Lung Fields] : clear lung fields [Good Air Entry] : good air entry [No Respiratory Distress] : no respiratory distress  [Soft] : abdomen soft [Non Tender] : non-tender [No Masses/organomegaly] : no masses/organomegaly [Normal Bowel Sounds] : normal bowel sounds [Normal Gait] : normal gait [No Edema] : no edema [No Cyanosis] : no cyanosis [No Clubbing] : no clubbing [No Varicosities] : no varicosities [No Rash] : no rash [No Skin Lesions] : no skin lesions [Moves all extremities] : moves all extremities [No Focal Deficits] : no focal deficits [Normal Speech] : normal speech [Alert and Oriented] : alert and oriented [Normal memory] : normal memory [General Appearance - Well Developed] : well developed [Normal Appearance] : normal appearance [Well Groomed] : well groomed [General Appearance - Well Nourished] : well nourished [No Deformities] : no deformities [General Appearance - In No Acute Distress] : no acute distress [Normal Conjunctiva] : the conjunctiva exhibited no abnormalities [Eyelids - No Xanthelasma] : the eyelids demonstrated no xanthelasmas [Normal Oral Mucosa] : normal oral mucosa [No Oral Pallor] : no oral pallor [No Oral Cyanosis] : no oral cyanosis [Normal Jugular Venous A Waves Present] : normal jugular venous A waves present [Normal Jugular Venous V Waves Present] : normal jugular venous V waves present [No Jugular Venous Hammer A Waves] : no jugular venous hammer A waves [Respiration, Rhythm And Depth] : normal respiratory rhythm and effort [Exaggerated Use Of Accessory Muscles For Inspiration] : no accessory muscle use [Auscultation Breath Sounds / Voice Sounds] : lungs were clear to auscultation bilaterally [Heart Rate And Rhythm] : heart rate and rhythm were normal [Heart Sounds] : normal S1 and S2 [Murmurs] : no murmurs present [Abnormal Walk] : normal gait [Gait - Sufficient For Exercise Testing] : the gait was sufficient for exercise testing [Nail Clubbing] : no clubbing of the fingernails [Cyanosis, Localized] : no localized cyanosis [Petechial Hemorrhages (___cm)] : no petechial hemorrhages [Skin Color & Pigmentation] : normal skin color and pigmentation [] : no rash [No Venous Stasis] : no venous stasis [Skin Lesions] : no skin lesions [No Skin Ulcers] : no skin ulcer [No Xanthoma] : no  xanthoma was observed

## 2021-12-01 NOTE — HISTORY OF PRESENT ILLNESS
[FreeTextEntry1] : 72y/o F with Hx CAD, HTN,and angina pectoris on Metoprolol presents for cardiac follow up visit. Pt reports left sided chest tightness radiating to her upper abdomen. She denies SOB, dyspnea on exertion, palpitations, leg swelling, or syncope. Pt reports no other complaints. Pt is complaint with her medications.\par \par patient had an echo which showed moderate aortic stenosis\par gradient of 32mm hg \par lv function is normal\par \par no symptoms\par \par repeat echo is non diagnostic\par will follow medically\par possible cath in 3 mos. or sooner if symptoms get worse

## 2021-12-02 ENCOUNTER — APPOINTMENT (OUTPATIENT)
Dept: INFUSION THERAPY | Facility: CLINIC | Age: 76
End: 2021-12-02
Payer: MEDICARE

## 2021-12-02 ENCOUNTER — LABORATORY RESULT (OUTPATIENT)
Age: 76
End: 2021-12-02

## 2021-12-02 ENCOUNTER — APPOINTMENT (OUTPATIENT)
Dept: HEMATOLOGY ONCOLOGY | Facility: CLINIC | Age: 76
End: 2021-12-02
Payer: MEDICARE

## 2021-12-02 VITALS
HEART RATE: 67 BPM | HEIGHT: 66 IN | OXYGEN SATURATION: 98 % | WEIGHT: 252 LBS | RESPIRATION RATE: 16 BRPM | TEMPERATURE: 97.2 F | SYSTOLIC BLOOD PRESSURE: 142 MMHG | BODY MASS INDEX: 40.5 KG/M2 | DIASTOLIC BLOOD PRESSURE: 63 MMHG

## 2021-12-02 DIAGNOSIS — Z87.898 PERSONAL HISTORY OF OTHER SPECIFIED CONDITIONS: ICD-10-CM

## 2021-12-02 LAB
HCT VFR BLD CALC: 28.3 %
HGB BLD-MCNC: 8.7 G/DL
MCHC RBC-ENTMCNC: 26.4 PG
MCHC RBC-ENTMCNC: 30.7 G/DL
MCV RBC AUTO: 85.8 FL
PLATELET # BLD AUTO: 135 K/UL
PMV BLD: 9.7 FL
RBC # BLD: 3.3 M/UL
RBC # FLD: 16 %
WBC # FLD AUTO: 4.01 K/UL

## 2021-12-02 PROCEDURE — 99213 OFFICE O/P EST LOW 20 MIN: CPT

## 2021-12-02 RX ORDER — HYDROCORTISONE 20 MG
100 TABLET ORAL ONCE
Refills: 0 | Status: COMPLETED | OUTPATIENT
Start: 2021-12-02 | End: 2021-12-02

## 2021-12-02 RX ORDER — IRON SUCROSE 20 MG/ML
200 INJECTION, SOLUTION INTRAVENOUS ONCE
Refills: 0 | Status: COMPLETED | OUTPATIENT
Start: 2021-12-02 | End: 2021-12-02

## 2021-12-02 RX ORDER — ACETAMINOPHEN 500 MG
650 TABLET ORAL ONCE
Refills: 0 | Status: COMPLETED | OUTPATIENT
Start: 2021-12-02 | End: 2021-12-02

## 2021-12-02 RX ORDER — ERYTHROPOIETIN 10000 [IU]/ML
20000 INJECTION, SOLUTION INTRAVENOUS; SUBCUTANEOUS ONCE
Refills: 0 | Status: COMPLETED | OUTPATIENT
Start: 2021-12-02 | End: 2021-12-02

## 2021-12-02 RX ADMIN — Medication 100 MILLIGRAM(S): at 15:03

## 2021-12-02 RX ADMIN — IRON SUCROSE 110 MILLIGRAM(S): 20 INJECTION, SOLUTION INTRAVENOUS at 15:03

## 2021-12-02 RX ADMIN — Medication 650 MILLIGRAM(S): at 15:03

## 2021-12-02 RX ADMIN — ERYTHROPOIETIN 20000 UNIT(S): 10000 INJECTION, SOLUTION INTRAVENOUS; SUBCUTANEOUS at 15:04

## 2021-12-03 ENCOUNTER — APPOINTMENT (OUTPATIENT)
Dept: INFUSION THERAPY | Facility: CLINIC | Age: 76
End: 2021-12-03

## 2021-12-04 NOTE — HISTORY OF PRESENT ILLNESS
[de-identified] : bruce is a 74 year old white female with hypertension, chronic kidney disease, morbidly obese presents today with normocytic anemia. \par Her most recent CBC 04/01/2020 shows WBC -5.66, HB of 6.7, MCV -86.3, RDW - 17.3, platelet count- 196. Her Hb was normal until 08/2019. In October 2019 she noticed black tarry stools and was feeling tired. She went to ER and her Hb was 5.3. She was given 3 units of PRBC. Her iron studies at that showed ferritin of 8 and percent saturation of 5. She had EGD and colonoscopy which did not reveal any bleeding lesions. Following that event as per patient she was not given iron (?not sure why). She was readmitted to hospital in 01/2020 for SOB on exertion and her HB was noted to 6.0 again. She was given 2 units and her iron studies were consistent with DARRIN. B12 and folate were normal. Immunofixation showed weak IgG lambda migrating para protein. Free light chain ratio 2.1. Normal calcium. \par EGD and VCE was done. EGD revealed gastritis and VCE showed bleeding in small bowel. \par She was started on oral iron and she took for about three months. \par Her latest ferritin done in feb 2020 was 24 and percent saturation was 72% and her hb improved to 8.8. She also has push enteroscopy in 02/2020 and no bleeding lesions were found. Was recommended to have repeat colonoscopy and double balloon enteroscopy if she bleeds again.\par She went for blood work again on 04/01/2020 as she was feeling weak and having SOB and her hb dropped to 6.7. She was told by PMD to start on PROCRIT 10,000 units M-W-F. She was told her iron levels are good and she can stop iron. \par \par Today she feels very tired and her SOB is worse. She denies any melena or BRBPR in last few days. She does have anal fissure and hemorrhoids and bleeds when she is constipated. She denies hematuria or post menopausal bleeding. Denies weight loss. Does not take any NSAIDs or aspirin. She has not followed up with nephrology before.\par Family history is significant for breast cancer in her mother. She is a former smoker stopped 20 years ago.\par She is uptodate with mammogram and Pap smear. \par  [de-identified] : 09/17/2020 Patient returns for follow up , she feels better after venofer X 4 and on EPO 10,000 weekly , Hb is up to 10 . \par \par 10/15/2020 Patient returns for follow up 2 weeks after last dose of EPO , today's Hb is 10.7 . she offers no new complaints . \par \par 04/08/2021 Patient returns for follow up for anemia on EPO and iron replacement . Hb is 10.8 . she complains of mild left arm pain after she started to self check her BP . ahe meds were recently adjusted by her PMD . \par \par 08/31/2021 patient returns for follow up , she offers no new complaints , her Hgb is slowly trending down after last transfusion and venofer X1 for ferritin : 35 . \par \par 11/18/2021 patient returns for follow up complaining of weakness and dyspnea on minimal exertion , still with lower extremity edema R > L . Hgb is 7.1 2 weeks after transfusion and despite venofer and EPO . She denies melena or hematochezia . Of note she had suspected small bowel bleeding on enteroscopy or capsule endoscopy and was intolerant to push enteroscopy ( hypotension ? ) \par 12/2/21:\par patient returns for follow up for DARRIN secondary  obscure GI bleeding  and CKD. \par She is feeling better today. We will continue  Venofer infusion weekly X 3 and Procrit.\par Close monitor CBC on weekly basis with possible blood transfusion if required. \par Patient reports feeling better, she denies CP, SOB. She is sometimes experienced  hematochezia secondary to chronica anal fissure and constipation.\par

## 2021-12-04 NOTE — ASSESSMENT
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 ) , Hgb is 8.7,  S/p two units of blood transfusion from 11/24/21.\par peripheral edema . \par Plan : \par Patient will start Venofer 200 mg weekly X3 \par Continue with Weekly Retacrit.\par Monitor Weekly CBC with possible blood transfusion if required. \par  Start Senna stool laxative PRN for Constipation. \par   Strongly recommend  GI follow up . \par patient seen and case discussed with Dr. Gaston who agreed for the above plan of care.

## 2021-12-09 ENCOUNTER — APPOINTMENT (OUTPATIENT)
Dept: INFUSION THERAPY | Facility: CLINIC | Age: 76
End: 2021-12-09

## 2021-12-09 ENCOUNTER — LABORATORY RESULT (OUTPATIENT)
Age: 76
End: 2021-12-09

## 2021-12-09 DIAGNOSIS — R21 RASH AND OTHER NONSPECIFIC SKIN ERUPTION: ICD-10-CM

## 2021-12-09 RX ORDER — ACETAMINOPHEN 500 MG
650 TABLET ORAL ONCE
Refills: 0 | Status: COMPLETED | OUTPATIENT
Start: 2021-12-09 | End: 2021-12-09

## 2021-12-09 RX ORDER — HYDROCORTISONE 20 MG
100 TABLET ORAL ONCE
Refills: 0 | Status: COMPLETED | OUTPATIENT
Start: 2021-12-09 | End: 2021-12-09

## 2021-12-09 RX ORDER — IRON SUCROSE 20 MG/ML
200 INJECTION, SOLUTION INTRAVENOUS ONCE
Refills: 0 | Status: COMPLETED | OUTPATIENT
Start: 2021-12-09 | End: 2021-12-09

## 2021-12-09 RX ORDER — ERYTHROPOIETIN 10000 [IU]/ML
20000 INJECTION, SOLUTION INTRAVENOUS; SUBCUTANEOUS ONCE
Refills: 0 | Status: COMPLETED | OUTPATIENT
Start: 2021-12-09 | End: 2021-12-09

## 2021-12-09 RX ADMIN — ERYTHROPOIETIN 20000 UNIT(S): 10000 INJECTION, SOLUTION INTRAVENOUS; SUBCUTANEOUS at 14:51

## 2021-12-09 RX ADMIN — Medication 650 MILLIGRAM(S): at 14:51

## 2021-12-09 RX ADMIN — IRON SUCROSE 220 MILLIGRAM(S): 20 INJECTION, SOLUTION INTRAVENOUS at 14:50

## 2021-12-09 RX ADMIN — Medication 100 MILLIGRAM(S): at 14:50

## 2021-12-10 ENCOUNTER — APPOINTMENT (OUTPATIENT)
Dept: INFUSION THERAPY | Facility: CLINIC | Age: 76
End: 2021-12-10

## 2021-12-10 LAB
ABO + RH PNL BLD: NORMAL
BLD GP AB SCN SERPL QL: NORMAL
HCT VFR BLD CALC: 25.6 %
HGB BLD-MCNC: 7.9 G/DL
MCHC RBC-ENTMCNC: 27.3 PG
MCHC RBC-ENTMCNC: 30.9 G/DL
MCV RBC AUTO: 88.6 FL
PLATELET # BLD AUTO: 127 K/UL
PMV BLD: 10.2 FL
RBC # BLD: 2.89 M/UL
RBC # FLD: 17.9 %
WBC # FLD AUTO: 3.72 K/UL

## 2021-12-15 ENCOUNTER — APPOINTMENT (OUTPATIENT)
Dept: HEMATOLOGY ONCOLOGY | Facility: CLINIC | Age: 76
End: 2021-12-15

## 2021-12-16 ENCOUNTER — LABORATORY RESULT (OUTPATIENT)
Age: 76
End: 2021-12-16

## 2021-12-16 ENCOUNTER — APPOINTMENT (OUTPATIENT)
Dept: INFUSION THERAPY | Facility: CLINIC | Age: 76
End: 2021-12-16

## 2021-12-16 RX ORDER — ERYTHROPOIETIN 10000 [IU]/ML
20000 INJECTION, SOLUTION INTRAVENOUS; SUBCUTANEOUS ONCE
Refills: 0 | Status: COMPLETED | OUTPATIENT
Start: 2021-12-16 | End: 2021-12-16

## 2021-12-16 RX ADMIN — ERYTHROPOIETIN 20000 UNIT(S): 10000 INJECTION, SOLUTION INTRAVENOUS; SUBCUTANEOUS at 15:48

## 2021-12-17 ENCOUNTER — APPOINTMENT (OUTPATIENT)
Dept: INFUSION THERAPY | Facility: CLINIC | Age: 76
End: 2021-12-17

## 2021-12-17 LAB
ABO + RH PNL BLD: NORMAL
BLD GP AB SCN SERPL QL: NORMAL
HCT VFR BLD CALC: 26.7 %
HGB BLD-MCNC: 7.7 G/DL
MCHC RBC-ENTMCNC: 27.3 PG
MCHC RBC-ENTMCNC: 28.8 G/DL
MCV RBC AUTO: 94.7 FL
PLATELET # BLD AUTO: 106 K/UL
PMV BLD: 10.6 FL
RBC # BLD: 2.82 M/UL
RBC # FLD: 18.6 %
WBC # FLD AUTO: 3.08 K/UL

## 2021-12-17 RX ORDER — HYDROCORTISONE 20 MG
100 TABLET ORAL ONCE
Refills: 0 | Status: COMPLETED | OUTPATIENT
Start: 2021-12-17 | End: 2021-12-17

## 2021-12-17 RX ORDER — ACETAMINOPHEN 500 MG
650 TABLET ORAL ONCE
Refills: 0 | Status: COMPLETED | OUTPATIENT
Start: 2021-12-17 | End: 2021-12-17

## 2021-12-17 RX ADMIN — Medication 100 MILLIGRAM(S): at 15:31

## 2021-12-17 RX ADMIN — Medication 650 MILLIGRAM(S): at 15:31

## 2021-12-22 ENCOUNTER — LABORATORY RESULT (OUTPATIENT)
Age: 76
End: 2021-12-22

## 2021-12-22 ENCOUNTER — APPOINTMENT (OUTPATIENT)
Dept: INFUSION THERAPY | Facility: CLINIC | Age: 76
End: 2021-12-22

## 2021-12-22 RX ORDER — ERYTHROPOIETIN 10000 [IU]/ML
20000 INJECTION, SOLUTION INTRAVENOUS; SUBCUTANEOUS ONCE
Refills: 0 | Status: COMPLETED | OUTPATIENT
Start: 2021-12-22 | End: 2021-12-22

## 2021-12-22 RX ADMIN — ERYTHROPOIETIN 20000 UNIT(S): 10000 INJECTION, SOLUTION INTRAVENOUS; SUBCUTANEOUS at 13:01

## 2021-12-23 ENCOUNTER — APPOINTMENT (OUTPATIENT)
Dept: INFUSION THERAPY | Facility: CLINIC | Age: 76
End: 2021-12-23

## 2021-12-23 LAB
ABO + RH PNL BLD: NORMAL
BLD GP AB SCN SERPL QL: NORMAL
HCT VFR BLD CALC: 28 %
HGB BLD-MCNC: 8.4 G/DL
MCHC RBC-ENTMCNC: 27.1 PG
MCHC RBC-ENTMCNC: 30 G/DL
MCV RBC AUTO: 90.3 FL
PLATELET # BLD AUTO: 107 K/UL
PMV BLD: 9.8 FL
RBC # BLD: 3.1 M/UL
RBC # FLD: 17.5 %
WBC # FLD AUTO: 3.47 K/UL

## 2021-12-29 ENCOUNTER — APPOINTMENT (OUTPATIENT)
Dept: HEMATOLOGY ONCOLOGY | Facility: CLINIC | Age: 76
End: 2021-12-29

## 2021-12-29 ENCOUNTER — APPOINTMENT (OUTPATIENT)
Dept: INFUSION THERAPY | Facility: CLINIC | Age: 76
End: 2021-12-29

## 2021-12-29 ENCOUNTER — LABORATORY RESULT (OUTPATIENT)
Age: 76
End: 2021-12-29

## 2021-12-29 LAB
ABO + RH PNL BLD: NORMAL
BLD GP AB SCN SERPL QL: NORMAL
HCT VFR BLD CALC: 26.1 %
HGB BLD-MCNC: 7.6 G/DL
MCHC RBC-ENTMCNC: 27 PG
MCHC RBC-ENTMCNC: 29.1 G/DL
MCV RBC AUTO: 92.9 FL
PLATELET # BLD AUTO: 98 K/UL
PMV BLD: 9.2 FL
RBC # BLD: 2.81 M/UL
RBC # FLD: 16.8 %
WBC # FLD AUTO: 2.77 K/UL

## 2021-12-29 RX ORDER — ERYTHROPOIETIN 10000 [IU]/ML
20000 INJECTION, SOLUTION INTRAVENOUS; SUBCUTANEOUS ONCE
Refills: 0 | Status: COMPLETED | OUTPATIENT
Start: 2021-12-29 | End: 2021-12-29

## 2021-12-29 RX ADMIN — ERYTHROPOIETIN 20000 UNIT(S): 10000 INJECTION, SOLUTION INTRAVENOUS; SUBCUTANEOUS at 15:10

## 2021-12-30 ENCOUNTER — APPOINTMENT (OUTPATIENT)
Dept: INFUSION THERAPY | Facility: CLINIC | Age: 76
End: 2021-12-30

## 2021-12-30 ENCOUNTER — OUTPATIENT (OUTPATIENT)
Dept: OUTPATIENT SERVICES | Facility: HOSPITAL | Age: 76
LOS: 1 days | Discharge: HOME | End: 2021-12-30

## 2021-12-30 DIAGNOSIS — Z87.19 PERSONAL HISTORY OF OTHER DISEASES OF THE DIGESTIVE SYSTEM: Chronic | ICD-10-CM

## 2021-12-30 DIAGNOSIS — D64.9 ANEMIA, UNSPECIFIED: ICD-10-CM

## 2021-12-30 DIAGNOSIS — E61.1 IRON DEFICIENCY: ICD-10-CM

## 2021-12-30 RX ORDER — ACETAMINOPHEN 500 MG
650 TABLET ORAL ONCE
Refills: 0 | Status: COMPLETED | OUTPATIENT
Start: 2021-12-30 | End: 2021-12-30

## 2021-12-30 RX ORDER — HYDROCORTISONE 20 MG
100 TABLET ORAL ONCE
Refills: 0 | Status: COMPLETED | OUTPATIENT
Start: 2021-12-30 | End: 2021-12-30

## 2021-12-30 RX ADMIN — Medication 650 MILLIGRAM(S): at 15:04

## 2021-12-30 RX ADMIN — Medication 100 MILLIGRAM(S): at 15:06

## 2022-01-06 ENCOUNTER — EMERGENCY (EMERGENCY)
Facility: HOSPITAL | Age: 77
LOS: 0 days | Discharge: HOME | End: 2022-01-06
Attending: EMERGENCY MEDICINE | Admitting: EMERGENCY MEDICINE
Payer: MEDICARE

## 2022-01-06 VITALS
HEIGHT: 67 IN | HEART RATE: 63 BPM | SYSTOLIC BLOOD PRESSURE: 126 MMHG | WEIGHT: 210.1 LBS | RESPIRATION RATE: 18 BRPM | DIASTOLIC BLOOD PRESSURE: 58 MMHG | TEMPERATURE: 98 F | OXYGEN SATURATION: 98 %

## 2022-01-06 VITALS
TEMPERATURE: 97 F | OXYGEN SATURATION: 98 % | RESPIRATION RATE: 18 BRPM | HEART RATE: 65 BPM | DIASTOLIC BLOOD PRESSURE: 65 MMHG | SYSTOLIC BLOOD PRESSURE: 137 MMHG

## 2022-01-06 DIAGNOSIS — R06.02 SHORTNESS OF BREATH: ICD-10-CM

## 2022-01-06 DIAGNOSIS — D50.9 IRON DEFICIENCY ANEMIA, UNSPECIFIED: ICD-10-CM

## 2022-01-06 DIAGNOSIS — Z91.040 LATEX ALLERGY STATUS: ICD-10-CM

## 2022-01-06 DIAGNOSIS — Z87.19 PERSONAL HISTORY OF OTHER DISEASES OF THE DIGESTIVE SYSTEM: Chronic | ICD-10-CM

## 2022-01-06 DIAGNOSIS — Z88.0 ALLERGY STATUS TO PENICILLIN: ICD-10-CM

## 2022-01-06 DIAGNOSIS — I10 ESSENTIAL (PRIMARY) HYPERTENSION: ICD-10-CM

## 2022-01-06 LAB
ALBUMIN SERPL ELPH-MCNC: 3.7 G/DL — SIGNIFICANT CHANGE UP (ref 3.5–5.2)
ALP SERPL-CCNC: 84 U/L — SIGNIFICANT CHANGE UP (ref 30–115)
ALT FLD-CCNC: 8 U/L — SIGNIFICANT CHANGE UP (ref 0–41)
ANION GAP SERPL CALC-SCNC: 11 MMOL/L — SIGNIFICANT CHANGE UP (ref 7–14)
ANISOCYTOSIS BLD QL: SIGNIFICANT CHANGE UP
AST SERPL-CCNC: 19 U/L — SIGNIFICANT CHANGE UP (ref 0–41)
BASOPHILS # BLD AUTO: 0 K/UL — SIGNIFICANT CHANGE UP (ref 0–0.2)
BASOPHILS NFR BLD AUTO: 0 % — SIGNIFICANT CHANGE UP (ref 0–1)
BILIRUB SERPL-MCNC: 0.7 MG/DL — SIGNIFICANT CHANGE UP (ref 0.2–1.2)
BLD GP AB SCN SERPL QL: SIGNIFICANT CHANGE UP
BUN SERPL-MCNC: 33 MG/DL — HIGH (ref 10–20)
CALCIUM SERPL-MCNC: 8.3 MG/DL — LOW (ref 8.5–10.1)
CHLORIDE SERPL-SCNC: 116 MMOL/L — HIGH (ref 98–110)
CO2 SERPL-SCNC: 15 MMOL/L — LOW (ref 17–32)
CREAT SERPL-MCNC: 2.2 MG/DL — HIGH (ref 0.7–1.5)
DACRYOCYTES BLD QL SMEAR: SLIGHT — SIGNIFICANT CHANGE UP
EOSINOPHIL # BLD AUTO: 0.22 K/UL — SIGNIFICANT CHANGE UP (ref 0–0.7)
EOSINOPHIL NFR BLD AUTO: 8.6 % — HIGH (ref 0–8)
GIANT PLATELETS BLD QL SMEAR: PRESENT — SIGNIFICANT CHANGE UP
GLUCOSE SERPL-MCNC: 100 MG/DL — HIGH (ref 70–99)
HCT VFR BLD CALC: 26.1 % — LOW (ref 37–47)
HGB BLD-MCNC: 7.4 G/DL — LOW (ref 12–16)
LYMPHOCYTES # BLD AUTO: 0.84 K/UL — LOW (ref 1.2–3.4)
LYMPHOCYTES # BLD AUTO: 33.3 % — SIGNIFICANT CHANGE UP (ref 20.5–51.1)
MANUAL SMEAR VERIFICATION: SIGNIFICANT CHANGE UP
MCHC RBC-ENTMCNC: 26.4 PG — LOW (ref 27–31)
MCHC RBC-ENTMCNC: 28.4 G/DL — LOW (ref 32–37)
MCV RBC AUTO: 93.2 FL — SIGNIFICANT CHANGE UP (ref 81–99)
MICROCYTES BLD QL: SIGNIFICANT CHANGE UP
MONOCYTES # BLD AUTO: 0.13 K/UL — SIGNIFICANT CHANGE UP (ref 0.1–0.6)
MONOCYTES NFR BLD AUTO: 5.1 % — SIGNIFICANT CHANGE UP (ref 1.7–9.3)
NEUTROPHILS # BLD AUTO: 1.34 K/UL — LOW (ref 1.4–6.5)
NEUTROPHILS NFR BLD AUTO: 50.4 % — SIGNIFICANT CHANGE UP (ref 42.2–75.2)
NEUTS BAND # BLD: 2.6 % — SIGNIFICANT CHANGE UP (ref 0–6)
OVALOCYTES BLD QL SMEAR: SLIGHT — SIGNIFICANT CHANGE UP
PLAT MORPH BLD: NORMAL — SIGNIFICANT CHANGE UP
PLATELET # BLD AUTO: 116 K/UL — LOW (ref 130–400)
POIKILOCYTOSIS BLD QL AUTO: SLIGHT — SIGNIFICANT CHANGE UP
POLYCHROMASIA BLD QL SMEAR: SLIGHT — SIGNIFICANT CHANGE UP
POTASSIUM SERPL-MCNC: 5.7 MMOL/L — HIGH (ref 3.5–5)
POTASSIUM SERPL-SCNC: 5.7 MMOL/L — HIGH (ref 3.5–5)
PROT SERPL-MCNC: 6.7 G/DL — SIGNIFICANT CHANGE UP (ref 6–8)
RBC # BLD: 2.8 M/UL — LOW (ref 4.2–5.4)
RBC # FLD: 17.8 % — HIGH (ref 11.5–14.5)
RBC BLD AUTO: ABNORMAL
SARS-COV-2 RNA SPEC QL NAA+PROBE: DETECTED
SODIUM SERPL-SCNC: 142 MMOL/L — SIGNIFICANT CHANGE UP (ref 135–146)
WBC # BLD: 2.53 K/UL — LOW (ref 4.8–10.8)
WBC # FLD AUTO: 2.53 K/UL — LOW (ref 4.8–10.8)

## 2022-01-06 PROCEDURE — 71045 X-RAY EXAM CHEST 1 VIEW: CPT | Mod: 26

## 2022-01-06 PROCEDURE — 99284 EMERGENCY DEPT VISIT MOD MDM: CPT

## 2022-01-06 RX ORDER — HYDROCORTISONE 20 MG
100 TABLET ORAL ONCE
Refills: 0 | Status: COMPLETED | OUTPATIENT
Start: 2022-01-06 | End: 2022-01-06

## 2022-01-06 RX ORDER — ACETAMINOPHEN 500 MG
650 TABLET ORAL ONCE
Refills: 0 | Status: COMPLETED | OUTPATIENT
Start: 2022-01-06 | End: 2022-01-06

## 2022-01-06 RX ADMIN — Medication 100 MILLIGRAM(S): at 15:21

## 2022-01-06 RX ADMIN — Medication 650 MILLIGRAM(S): at 15:21

## 2022-01-06 NOTE — ED PROVIDER NOTE - CARE PROVIDER_API CALL
MANUELA MADRIGAL  Internal Medicine  [address] [place] [address]  Phone: [phone]  Fax: ()-  Follow Up Time: 1-3 Days

## 2022-01-06 NOTE — ED ADULT TRIAGE NOTE - CHIEF COMPLAINT QUOTE
Pt sent by PMD for blood transfusion for low Hgb/hct. Pt reports receiving weekly blood transfusions every Friday but instructed to come to ED today for transfusion due to possible snow tomorrow. Pt denies fever, reports intermittent dizziness.

## 2022-01-06 NOTE — ED ADULT NURSE NOTE - ED COMFORT CARE
----- Message from Rosa Gomes MD sent at 3/3/2021  5:27 PM CST -----  Labs are stable  A1c is 6.3   Lipids are good and at goal   See her in 4 months   Will repeat A1c bmp and cbc prior to seeing me   Keep a close eye on BP  
Left message to call back.  
Patient returning call please call patient .  
Writer informed patient of lab results and PCP's recommendations. Labs ordered for end of June, prior to July 1 office visit.  
Patient informed/Explanation of wait

## 2022-01-06 NOTE — ED PROCEDURE NOTE - ATTENDING CONTRIBUTION TO CARE
I was present for and supervised the key/critical aspects of the procedures performed during the care of the patient. I personally supervised the study. I reviewed the images and agree with the interpretation and have edited where appropriate.

## 2022-01-06 NOTE — ED PROVIDER NOTE - CLINICAL SUMMARY MEDICAL DECISION MAKING FREE TEXT BOX
76-year-old female presents to the ED for low hemoglobin requiring blood transfusion.  Patient was seen by the initial team who ordered labs and was pending transfusion.  Vitals reviewed by me and noted to be within normal limits.  Case signed out to me pending transfusion.  Patient on my examination is resting comfortably and without pallor, and denies any chest pain or shortness of breath.  Labs reviewed by me noted to have hemoglobin–7.4.  Hemolyzed potassium.  1 unit of PRBCs transfused.  To home with PMD follow-up.
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
## 2022-01-06 NOTE — ED ADULT TRIAGE NOTE - AS HEIGHT TYPE
----- Message from Marysol Hall sent at 5/21/2018 10:04 AM CDT -----  Contact: Patient [phone]  Would like to speak to you in reference to her cardiac scoring. Requesting a call ASAP.    Please call and advise.    Thank You     
See other msg. I called patient yesterday close to 4pm and discussed results and faxed to cardiologist external.  
stated

## 2022-01-06 NOTE — ED PROVIDER NOTE - NSFOLLOWUPINSTRUCTIONS_ED_ALL_ED_FT
Anemia    Anemia is a condition in which the concentration of red blood cells or hemoglobin in the blood is below normal. Hemoglobin is a substance in red blood cells that carries oxygen to the tissues of the body. Anemia results in not enough oxygen reaching these tissues which can cause symptoms such as weakness, dizziness/lightheadedness, shortness of breath, chest pain, paleness, or nausea.    SEEK IMMEDIATE MEDICAL CARE IF YOU HAVE THE FOLLOWING SYMPTOMS: extreme weakness/chest pain/shortness of breath, black or bloody stools, vomiting blood, fainting, fever, or any signs of dehydration.    You have tested POSITIVE for the novel coronavirus (COVID-19). Upon discharge, you must self-quarantine for 14 days, or until the Department of Health contacts you. Please wear a face mask if you are around other individuals. Try to avoid contact with house members, family, and friends for the duration of this quarantine. If you have high fever, shortness of breathe, extreme fatigue, or bloody cough please call your doctor.

## 2022-01-06 NOTE — ED PROVIDER NOTE - ATTENDING CONTRIBUTION TO CARE
75 y/o female h/o HTN, anemia presents for low hgb / transuison, reports feeling weak and slightly SOB, denies associated complaints at present. Old chart reviewed. I have reviewed and agree with the initial nursing note, except as documented in my note.    VSS, awake, alert, non-toxic appearing, lying comfortably in stretcher, in NAD, oropharynx clear, mmm, chest CTAB, non-labored breathing, no w/r/r, +S1/S2, RRR, no m/r/g, abdomen soft, NT, ND, +BS, no peripheral edema or deformities, alert, clear speech and steady gait.

## 2022-01-06 NOTE — ED PROVIDER NOTE - PATIENT PORTAL LINK FT
You can access the FollowMyHealth Patient Portal offered by [facility] by registering at the following website: [email or web address]. By joining [employer]’s FollowMyHealth portal, you will also be able to view your health information using other applications (apps) compatible with our system.

## 2022-01-10 ENCOUNTER — NON-APPOINTMENT (OUTPATIENT)
Age: 77
End: 2022-01-10

## 2022-01-11 ENCOUNTER — EMERGENCY (EMERGENCY)
Facility: HOSPITAL | Age: 77
LOS: 0 days | Discharge: HOME | End: 2022-01-12
Attending: EMERGENCY MEDICINE | Admitting: EMERGENCY MEDICINE
Payer: MEDICARE

## 2022-01-11 VITALS
SYSTOLIC BLOOD PRESSURE: 129 MMHG | DIASTOLIC BLOOD PRESSURE: 65 MMHG | RESPIRATION RATE: 23 BRPM | HEART RATE: 87 BPM | WEIGHT: 210.1 LBS | HEIGHT: 67 IN | TEMPERATURE: 98 F | OXYGEN SATURATION: 97 %

## 2022-01-11 DIAGNOSIS — R01.1 CARDIAC MURMUR, UNSPECIFIED: ICD-10-CM

## 2022-01-11 DIAGNOSIS — L30.9 DERMATITIS, UNSPECIFIED: ICD-10-CM

## 2022-01-11 DIAGNOSIS — Z79.82 LONG TERM (CURRENT) USE OF ASPIRIN: ICD-10-CM

## 2022-01-11 DIAGNOSIS — R53.1 WEAKNESS: ICD-10-CM

## 2022-01-11 DIAGNOSIS — Z88.0 ALLERGY STATUS TO PENICILLIN: ICD-10-CM

## 2022-01-11 DIAGNOSIS — I10 ESSENTIAL (PRIMARY) HYPERTENSION: ICD-10-CM

## 2022-01-11 DIAGNOSIS — Z87.19 PERSONAL HISTORY OF OTHER DISEASES OF THE DIGESTIVE SYSTEM: Chronic | ICD-10-CM

## 2022-01-11 DIAGNOSIS — U07.1 COVID-19: ICD-10-CM

## 2022-01-11 DIAGNOSIS — R06.02 SHORTNESS OF BREATH: ICD-10-CM

## 2022-01-11 DIAGNOSIS — D64.9 ANEMIA, UNSPECIFIED: ICD-10-CM

## 2022-01-11 DIAGNOSIS — R11.2 NAUSEA WITH VOMITING, UNSPECIFIED: ICD-10-CM

## 2022-01-11 DIAGNOSIS — Z91.040 LATEX ALLERGY STATUS: ICD-10-CM

## 2022-01-11 LAB
ALBUMIN SERPL ELPH-MCNC: 3.8 G/DL — SIGNIFICANT CHANGE UP (ref 3.5–5.2)
ALP SERPL-CCNC: 85 U/L — SIGNIFICANT CHANGE UP (ref 30–115)
ALT FLD-CCNC: 9 U/L — SIGNIFICANT CHANGE UP (ref 0–41)
ANION GAP SERPL CALC-SCNC: 13 MMOL/L — SIGNIFICANT CHANGE UP (ref 7–14)
APTT BLD: SIGNIFICANT CHANGE UP SEC (ref 27–39.2)
AST SERPL-CCNC: 31 U/L — SIGNIFICANT CHANGE UP (ref 0–41)
BASOPHILS # BLD AUTO: 0 K/UL — SIGNIFICANT CHANGE UP (ref 0–0.2)
BASOPHILS NFR BLD AUTO: 0 % — SIGNIFICANT CHANGE UP (ref 0–1)
BILIRUB SERPL-MCNC: 0.7 MG/DL — SIGNIFICANT CHANGE UP (ref 0.2–1.2)
BLD GP AB SCN SERPL QL: SIGNIFICANT CHANGE UP
BUN SERPL-MCNC: 25 MG/DL — HIGH (ref 10–20)
CALCIUM SERPL-MCNC: 8.1 MG/DL — LOW (ref 8.5–10.1)
CHLORIDE SERPL-SCNC: 116 MMOL/L — HIGH (ref 98–110)
CO2 SERPL-SCNC: 16 MMOL/L — LOW (ref 17–32)
CREAT SERPL-MCNC: 1.7 MG/DL — HIGH (ref 0.7–1.5)
EOSINOPHIL # BLD AUTO: 0.2 K/UL — SIGNIFICANT CHANGE UP (ref 0–0.7)
EOSINOPHIL NFR BLD AUTO: 10.7 % — HIGH (ref 0–8)
GLUCOSE SERPL-MCNC: 79 MG/DL — SIGNIFICANT CHANGE UP (ref 70–99)
HCT VFR BLD CALC: 27.5 % — LOW (ref 37–47)
HGB BLD-MCNC: 7.8 G/DL — LOW (ref 12–16)
IMM GRANULOCYTES NFR BLD AUTO: 0.5 % — HIGH (ref 0.1–0.3)
INR BLD: 0.93 RATIO — SIGNIFICANT CHANGE UP (ref 0.65–1.3)
LYMPHOCYTES # BLD AUTO: 0.54 K/UL — LOW (ref 1.2–3.4)
LYMPHOCYTES # BLD AUTO: 28.9 % — SIGNIFICANT CHANGE UP (ref 20.5–51.1)
MCHC RBC-ENTMCNC: 25.9 PG — LOW (ref 27–31)
MCHC RBC-ENTMCNC: 28.4 G/DL — LOW (ref 32–37)
MCV RBC AUTO: 91.4 FL — SIGNIFICANT CHANGE UP (ref 81–99)
MONOCYTES # BLD AUTO: 0.28 K/UL — SIGNIFICANT CHANGE UP (ref 0.1–0.6)
MONOCYTES NFR BLD AUTO: 15 % — HIGH (ref 1.7–9.3)
NEUTROPHILS # BLD AUTO: 0.84 K/UL — LOW (ref 1.4–6.5)
NEUTROPHILS NFR BLD AUTO: 44.9 % — SIGNIFICANT CHANGE UP (ref 42.2–75.2)
NRBC # BLD: 0 /100 WBCS — SIGNIFICANT CHANGE UP (ref 0–0)
NT-PROBNP SERPL-SCNC: 694 PG/ML — HIGH (ref 0–300)
PLATELET # BLD AUTO: 115 K/UL — LOW (ref 130–400)
POTASSIUM SERPL-MCNC: 5.6 MMOL/L — HIGH (ref 3.5–5)
POTASSIUM SERPL-SCNC: 5.6 MMOL/L — HIGH (ref 3.5–5)
PROT SERPL-MCNC: 6.8 G/DL — SIGNIFICANT CHANGE UP (ref 6–8)
PROTHROM AB SERPL-ACNC: 10.7 SEC — SIGNIFICANT CHANGE UP (ref 9.95–12.87)
RBC # BLD: 3.01 M/UL — LOW (ref 4.2–5.4)
RBC # FLD: 17.3 % — HIGH (ref 11.5–14.5)
SODIUM SERPL-SCNC: 145 MMOL/L — SIGNIFICANT CHANGE UP (ref 135–146)
TROPONIN T SERPL-MCNC: <0.01 NG/ML — SIGNIFICANT CHANGE UP
WBC # BLD: 1.87 K/UL — LOW (ref 4.8–10.8)
WBC # FLD AUTO: 1.87 K/UL — LOW (ref 4.8–10.8)

## 2022-01-11 PROCEDURE — 71045 X-RAY EXAM CHEST 1 VIEW: CPT | Mod: 26

## 2022-01-11 PROCEDURE — 93010 ELECTROCARDIOGRAM REPORT: CPT | Mod: 76

## 2022-01-11 PROCEDURE — 99285 EMERGENCY DEPT VISIT HI MDM: CPT

## 2022-01-11 RX ORDER — ACETAMINOPHEN 500 MG
650 TABLET ORAL ONCE
Refills: 0 | Status: COMPLETED | OUTPATIENT
Start: 2022-01-11 | End: 2022-01-11

## 2022-01-11 RX ORDER — DIPHENHYDRAMINE HCL 50 MG
50 CAPSULE ORAL ONCE
Refills: 0 | Status: COMPLETED | OUTPATIENT
Start: 2022-01-11 | End: 2022-01-11

## 2022-01-11 RX ADMIN — Medication 50 MILLIGRAM(S): at 22:13

## 2022-01-11 RX ADMIN — Medication 650 MILLIGRAM(S): at 22:07

## 2022-01-11 NOTE — ED PROVIDER NOTE - CARE PROVIDER_API CALL
Liu Gaston)  Internal Medicine; Medical Oncology  [address] [place] [address]  Phone: [phone]  Fax: [phone]  Follow Up Time: 1-3 Days

## 2022-01-11 NOTE — ED PROVIDER NOTE - ATTENDING CONTRIBUTION TO CARE
ATTENDING NOTE: 75 y/o F, PMH rheumatic fever and microcytic anemia presents to the ED with increased SOB, orthopnea without fever. Pt also endorses nausea and HA x5 days after a recent blood transfusion and testing positive for Covid-19. On exam: Pt NAD, S1S2, 5/6 systolic murmur heard best over aortic valve. CTAB, ABD soft NTND, 1+ pedal edema b/l. Plan to evaluate for Covid-19, HF or SOB related to anemia.

## 2022-01-11 NOTE — ED PROVIDER NOTE - PROGRESS NOTE DETAILS
ATTENDING NOTE: 75 y/o F, PMH rheumatic fever and microcytic anemia presents to the ED with increased SOB, orthopnea without fever. Pt also endorses nausea and HA x5 days after a recent blood transfusion and testing positive for Covid-19. On exam: Pt NAD, S1S2, 5/6 systolic murmur heard best over aortic valve. CTAB, ABD soft NTND, 1+ pedal edema b/l. Plan to evaluate for Covid-19, HF or SOB related to anemia. patient signed out to dr. ness pending transfusion. Spoke to heme/onc fellow states patient can be transfused 1 unit and state patient being pancytopenic is not new and can be follow up as an outpatient.

## 2022-01-11 NOTE — ED PROVIDER NOTE - PATIENT PORTAL LINK FT
You can access the FollowMyHealth Patient Portal offered by [facility] by registering at the following website: [email or web address]. By joining [employer]’s FollowMyHealth portal, you will also be able to view your health information using other applications (apps) compatible with our system.

## 2022-01-11 NOTE — ED PROVIDER NOTE - OBJECTIVE STATEMENT
75 yo F with pmhx of rheumatic fever, anemia, +COVID on 1/6 presenting with generalized weakness, sob, orthopnea, nausea, and vomiting. Pt states she gets frequent blood transfusions and states that this is the way she feels when she needs one. No cp,fever, chills, abdominal pain, nausea, vomiting, diarrhea, back pain, urinary symptoms, headache, dizziness, or paresthesias.

## 2022-01-11 NOTE — ED ADULT NURSE NOTE - NSIMPLEMENTINTERV_GEN_ALL_ED
Implemented All Fall with Harm Risk Interventions:  [place] to call system. Call bell, personal items and telephone within reach. Instruct patient to call for assistance. Room bathroom lighting operational. Non-slip footwear when patient is off stretcher. Physically safe environment: no spills, clutter or unnecessary equipment. Stretcher in lowest position, wheels locked, appropriate side rails in place. Provide visual cue, wrist band, yellow gown, etc. Monitor gait and stability. Monitor for mental status changes and reorient to person, place, and time. Review medications for side effects contributing to fall risk. Reinforce activity limits and safety measures with patient and family. Provide visual clues: red socks.

## 2022-01-11 NOTE — ED PROVIDER NOTE - CLINICAL SUMMARY MEDICAL DECISION MAKING FREE TEXT BOX
Patient evaluated for anemia secondary to iron deficiency.  Patient was transfused as indicated.  Shortness of breath likely secondary to anemia and COVID.  Her pulse oximeter on room air and with exertion was normal.  A dose of Lasix was given secondary to her chest x-ray findings of mild pulmonary vascular congestion.  Patient will follow-up with her hematologist.

## 2022-01-11 NOTE — ED ADULT TRIAGE NOTE - CHIEF COMPLAINT QUOTE
"I tested (+) for covid last Thursday (1/6/22). I'm back again because my breathing is not improving and my cough is getting worse."

## 2022-01-11 NOTE — ED ADULT NURSE NOTE - OBJECTIVE STATEMENT
Pt presents to ED c/o SOB and cough. Pt reports + Covid  1/6/22. Pt reports no improvement in SOB or cough, pt denies fever.

## 2022-01-11 NOTE — ED ADULT NURSE REASSESSMENT NOTE - NS ED NURSE REASSESS COMMENT FT1
Pt gave Son's number. Jace Marroquin. [phone]. Pt requested update to be given to family. On call to family, pt stated concern for finding the cause of symptomatic anemia to prevent pt from returning to the ED every other week for blood.

## 2022-01-11 NOTE — ED PROVIDER NOTE - NS ED ROS FT
Review of Systems:  	•	CONSTITUTIONAL - no fever, no diaphoresis, no chills  	•	SKIN - no rash  	•	HEMATOLOGIC - no bleeding, no bruising  	•	EYES - no eye pain, no blurry vision  	•	ENT - no congestion  	•	RESPIRATORY - +shortness of breath, no cough  	•	CARDIAC - no chest pain, no palpitations  	•	GI - no abd pain, no nausea, no vomiting, no diarrhea, no constipation  	•	GENITO-URINARY - no dysuria; no hematuria, no increased urinary frequency  	•	MUSCULOSKELETAL - no joint paint, no swelling, no redness  	•	NEUROLOGIC -  +weakness, + headache, no paresthesias, no LOC  	•	PSYCH - no anxiety,  no depression  	All other ROS are negative except as documented in HPI.

## 2022-01-12 ENCOUNTER — NON-APPOINTMENT (OUTPATIENT)
Age: 77
End: 2022-01-12

## 2022-01-12 RX ORDER — FUROSEMIDE 40 MG
20 TABLET ORAL ONCE
Refills: 0 | Status: COMPLETED | OUTPATIENT
Start: 2022-01-11 | End: 2022-01-11

## 2022-01-12 RX ADMIN — Medication 20 MILLIGRAM(S): at 00:41

## 2022-01-13 ENCOUNTER — NON-APPOINTMENT (OUTPATIENT)
Age: 77
End: 2022-01-13

## 2022-01-17 ENCOUNTER — INPATIENT (INPATIENT)
Facility: HOSPITAL | Age: 77
LOS: 6 days | Discharge: NURSING, REHAB OR LONG-TERM CARE | End: 2022-01-24
Attending: INTERNAL MEDICINE | Admitting: INTERNAL MEDICINE
Payer: MEDICARE

## 2022-01-17 VITALS
SYSTOLIC BLOOD PRESSURE: 144 MMHG | TEMPERATURE: 98 F | RESPIRATION RATE: 18 BRPM | HEIGHT: 67 IN | DIASTOLIC BLOOD PRESSURE: 63 MMHG | OXYGEN SATURATION: 98 % | HEART RATE: 96 BPM

## 2022-01-17 DIAGNOSIS — Z87.19 PERSONAL HISTORY OF OTHER DISEASES OF THE DIGESTIVE SYSTEM: Chronic | ICD-10-CM

## 2022-01-17 LAB
ALBUMIN SERPL ELPH-MCNC: 3.7 G/DL — SIGNIFICANT CHANGE UP (ref 3.5–5.2)
ALP SERPL-CCNC: 78 U/L — SIGNIFICANT CHANGE UP (ref 30–115)
ALT FLD-CCNC: 6 U/L — SIGNIFICANT CHANGE UP (ref 0–41)
ANION GAP SERPL CALC-SCNC: 12 MMOL/L — SIGNIFICANT CHANGE UP (ref 7–14)
ANISOCYTOSIS BLD QL: SLIGHT — SIGNIFICANT CHANGE UP
APTT BLD: 39.3 SEC — HIGH (ref 27–39.2)
AST SERPL-CCNC: 15 U/L — SIGNIFICANT CHANGE UP (ref 0–41)
BASOPHILS # BLD AUTO: 0 K/UL — SIGNIFICANT CHANGE UP (ref 0–0.2)
BASOPHILS NFR BLD AUTO: 0 % — SIGNIFICANT CHANGE UP (ref 0–1)
BILIRUB SERPL-MCNC: 0.8 MG/DL — SIGNIFICANT CHANGE UP (ref 0.2–1.2)
BLD GP AB SCN SERPL QL: SIGNIFICANT CHANGE UP
BUN SERPL-MCNC: 27 MG/DL — HIGH (ref 10–20)
CALCIUM SERPL-MCNC: 8.5 MG/DL — SIGNIFICANT CHANGE UP (ref 8.5–10.1)
CHLORIDE SERPL-SCNC: 115 MMOL/L — HIGH (ref 98–110)
CO2 SERPL-SCNC: 18 MMOL/L — SIGNIFICANT CHANGE UP (ref 17–32)
CREAT SERPL-MCNC: 2 MG/DL — HIGH (ref 0.7–1.5)
EOSINOPHIL # BLD AUTO: 0.03 K/UL — SIGNIFICANT CHANGE UP (ref 0–0.7)
EOSINOPHIL NFR BLD AUTO: 0.9 % — SIGNIFICANT CHANGE UP (ref 0–8)
GIANT PLATELETS BLD QL SMEAR: PRESENT — SIGNIFICANT CHANGE UP
GLUCOSE SERPL-MCNC: 69 MG/DL — LOW (ref 70–99)
HCT VFR BLD CALC: 27.4 % — LOW (ref 37–47)
HGB BLD-MCNC: 8.1 G/DL — LOW (ref 12–16)
HYPOCHROMIA BLD QL: SLIGHT — SIGNIFICANT CHANGE UP
INR BLD: 1.06 RATIO — SIGNIFICANT CHANGE UP (ref 0.65–1.3)
LIDOCAIN IGE QN: 48 U/L — SIGNIFICANT CHANGE UP (ref 7–60)
LYMPHOCYTES # BLD AUTO: 0.58 K/UL — LOW (ref 1.2–3.4)
LYMPHOCYTES # BLD AUTO: 19.5 % — LOW (ref 20.5–51.1)
MACROCYTES BLD QL: SLIGHT — SIGNIFICANT CHANGE UP
MANUAL SMEAR VERIFICATION: SIGNIFICANT CHANGE UP
MCHC RBC-ENTMCNC: 26.8 PG — LOW (ref 27–31)
MCHC RBC-ENTMCNC: 29.6 G/DL — LOW (ref 32–37)
MCV RBC AUTO: 90.7 FL — SIGNIFICANT CHANGE UP (ref 81–99)
MONOCYTES # BLD AUTO: 0.31 K/UL — SIGNIFICANT CHANGE UP (ref 0.1–0.6)
MONOCYTES NFR BLD AUTO: 10.6 % — HIGH (ref 1.7–9.3)
NEUTROPHILS # BLD AUTO: 2.04 K/UL — SIGNIFICANT CHANGE UP (ref 1.4–6.5)
NEUTROPHILS NFR BLD AUTO: 69 % — SIGNIFICANT CHANGE UP (ref 42.2–75.2)
OVALOCYTES BLD QL SMEAR: SLIGHT — SIGNIFICANT CHANGE UP
PLAT MORPH BLD: NORMAL — SIGNIFICANT CHANGE UP
PLATELET # BLD AUTO: 99 K/UL — LOW (ref 130–400)
POIKILOCYTOSIS BLD QL AUTO: SIGNIFICANT CHANGE UP
POLYCHROMASIA BLD QL SMEAR: SLIGHT — SIGNIFICANT CHANGE UP
POTASSIUM SERPL-MCNC: 5.6 MMOL/L — HIGH (ref 3.5–5)
POTASSIUM SERPL-SCNC: 5.6 MMOL/L — HIGH (ref 3.5–5)
PROT SERPL-MCNC: 6.5 G/DL — SIGNIFICANT CHANGE UP (ref 6–8)
PROTHROM AB SERPL-ACNC: 12.2 SEC — SIGNIFICANT CHANGE UP (ref 9.95–12.87)
RBC # BLD: 3.02 M/UL — LOW (ref 4.2–5.4)
RBC # FLD: 16.9 % — HIGH (ref 11.5–14.5)
RBC BLD AUTO: ABNORMAL
SARS-COV-2 RNA SPEC QL NAA+PROBE: DETECTED
SODIUM SERPL-SCNC: 145 MMOL/L — SIGNIFICANT CHANGE UP (ref 135–146)
TROPONIN T SERPL-MCNC: <0.01 NG/ML — SIGNIFICANT CHANGE UP
WBC # BLD: 2.96 K/UL — LOW (ref 4.8–10.8)
WBC # FLD AUTO: 2.96 K/UL — LOW (ref 4.8–10.8)

## 2022-01-17 PROCEDURE — 99285 EMERGENCY DEPT VISIT HI MDM: CPT | Mod: 25

## 2022-01-17 PROCEDURE — 93010 ELECTROCARDIOGRAM REPORT: CPT

## 2022-01-17 PROCEDURE — 74176 CT ABD & PELVIS W/O CONTRAST: CPT | Mod: 26,MA

## 2022-01-17 PROCEDURE — 71045 X-RAY EXAM CHEST 1 VIEW: CPT | Mod: 26

## 2022-01-17 RX ORDER — DEXTROSE 50 % IN WATER 50 %
50 SYRINGE (ML) INTRAVENOUS ONCE
Refills: 0 | Status: DISCONTINUED | OUTPATIENT
Start: 2022-01-17 | End: 2022-01-17

## 2022-01-17 RX ORDER — SODIUM CHLORIDE 9 MG/ML
1000 INJECTION INTRAMUSCULAR; INTRAVENOUS; SUBCUTANEOUS ONCE
Refills: 0 | Status: COMPLETED | OUTPATIENT
Start: 2022-01-17 | End: 2022-01-17

## 2022-01-17 RX ORDER — FAMOTIDINE 10 MG/ML
20 INJECTION INTRAVENOUS ONCE
Refills: 0 | Status: COMPLETED | OUTPATIENT
Start: 2022-01-17 | End: 2022-01-17

## 2022-01-17 RX ORDER — INSULIN HUMAN 100 [IU]/ML
8 INJECTION, SOLUTION SUBCUTANEOUS ONCE
Refills: 0 | Status: DISCONTINUED | OUTPATIENT
Start: 2022-01-17 | End: 2022-01-17

## 2022-01-17 RX ADMIN — SODIUM CHLORIDE 1000 MILLILITER(S): 9 INJECTION INTRAMUSCULAR; INTRAVENOUS; SUBCUTANEOUS at 17:07

## 2022-01-17 RX ADMIN — FAMOTIDINE 104 MILLIGRAM(S): 10 INJECTION INTRAVENOUS at 17:07

## 2022-01-17 NOTE — ED PROVIDER NOTE - CLINICAL SUMMARY MEDICAL DECISION MAKING FREE TEXT BOX
Patient presents with covid symptoms, abdominal pain and rectal bleeding. labs, ekg, cxr, ct done. no acute findings. Patient admitted for further management.

## 2022-01-17 NOTE — CHART NOTE - NSCHARTNOTEFT_GEN_A_CORE
LMSW spoke with Pt's son Jace Blum #[phone], who requested medical update regarding Pt's current status, noting that Pt's  is currently being admitted to [facility] as of today 1/17/22.   LMSW notified Attending Dr. Carty that Pt's son would appreciate follow up and update regarding Pt's current medical status.

## 2022-01-17 NOTE — H&P ADULT - NSHPLABSRESULTS_GEN_ALL_CORE
8.1    2.96  )-----------( 99       ( 17 Jan 2022 17:24 )             27.4       01-17    145  |  115<H>  |  27<H>  ----------------------------<  69<L>  5.6<H>   |  18  |  2.0<H>    Ca    8.5      17 Jan 2022 17:24    TPro  6.5  /  Alb  3.7  /  TBili  0.8  /  DBili  x   /  AST  15  /  ALT  6   /  AlkPhos  78  01-17                  PT/INR - ( 17 Jan 2022 17:24 )   PT: 12.20 sec;   INR: 1.06 ratio         PTT - ( 17 Jan 2022 17:24 )  PTT:39.3 sec    Lactate Trend      CARDIAC MARKERS ( 17 Jan 2022 17:24 )  x     / <0.01 ng/mL / x     / x     / x            CAPILLARY BLOOD GLUCOSE      POCT Blood Glucose.: 65 mg/dL (17 Jan 2022 18:36)        < from: CT Abdomen and Pelvis No Cont (01.17.22 @ 19:09) >    Scattered prominent periaortic, periportal mesenteric lymph nodes.   Finding is nonspecific and can represent infectious, inflammatory or   neoplastic etiologies.    Indeterminate left adrenal nodule. Consider nonemergent/outpatient   follow-up with CT adrenal protocol as clinically indicated.    < end of copied text >

## 2022-01-17 NOTE — ED PROVIDER NOTE - OBJECTIVE STATEMENT
76 y.o . F, pmh of CKD, HTN. DARRIN requiring weekly blood tranfusions, PUD, most recent EGD/colonoscopy was Nov 2021c( unremarkable) here for melenotic stool, sob, lgihtheadedness and epigastric pain x 2 days. No relief with meds. Nonradiating . No fever. chest pain cough or KANG. Tested + for covid 10 days ago. Denies other symptoms.

## 2022-01-17 NOTE — H&P ADULT - ATTENDING COMMENTS
HPI:  74 y F with PMH of severe aortic stenosis, HTN, morbidly obese, CKD, recurrent admission for iron infusions, blood transfusions,immuni  Immunofixation showed weak IgG lambda migrating para protein. Free light chain ratio 2.1. Normal calcium.   EGD and VCE was done. EGD revealed gastritis and VCE showed bleeding in small bowel.    presented to the ED with an episode of dark stools.  She had one episode last night , no FBPR, OLEKSANDR done in the ED was negative, no stool in the rectal vault.   pt reports shortness of breath, cough with sputum production.     in the ED,pt's VS T(C): 36.7 (01-17-22 @ 23:30), Max: 36.7 (01-17-22 @ 16:03)  HR: 78 (01-17-22 @ 23:30) (78 - 96)  BP: 150/72 (01-17-22 @ 23:30) (144/63 - 150/72)  RR: 18 (01-17-22 @ 23:30) (18 - 18)  SpO2: 99% (01-17-22 @ 23:30) (98% - 99%), labs done showed COVID-19 PCR: Detected, Hb: 8.1WBC Count: 2.96 Platelet Count: 99 Creatinine: 2.0  on PE, pt had bruising on left upper extremity, new per the patient.   CXR showed right opacities .  CT Abdomen and Pelvis No Cont (01.17.22 @ 19:09) >  Scattered prominent periaortic, periportal mesenteric lymph nodes.   Finding is nonspecific and can represent infectious, inflammatory or   neoplastic etiologies.  Indeterminate left adrenal nodule. Consider nonemergent/outpatient   follow-up with CT adrenal protocol as clinically indicated.    pt received Famotidine, insulin for hyperkalemia, NS 1 L. on room air.   pt is admitted for workup/management (17 Jan 2022 23:50)    REVIEW OF SYSTEMS:  CONSTITUTIONAL: No weakness, fevers or chills  EYES/ENT: No visual changes;  No vertigo or throat pain   NECK: No pain or stiffness  RESPIRATORY: No cough, wheezing, hemoptysis; No shortness of breath  CARDIOVASCULAR: No chest pain or palpitations  GASTROINTESTINAL: No abdominal or epigastric pain. No nausea, vomiting, or hematemesis; No diarrhea or constipation. No melena or hematochezia.  GENITOURINARY: No dysuria, frequency or hematuria  NEUROLOGICAL: No numbness or weakness  SKIN: No itching, rashes    Physical Exam:  General: WN/WD NAD  Neurology: A&Ox3, nonfocal, follows commands  Eyes: PERRLA/ EOMI  ENT/Neck: Neck supple, trachea midline, No JVD  Respiratory: CTA B/L, No wheezing, rales, rhonchi  CV: Normal rate regular rhythm, S1S2, no murmurs, rubs or gallops  Abdominal: Soft, NT, ND +BS,   Extremities: No edema, + peripheral pulses  Skin: No Rashes, Hematoma, Ecchymosis  Incisions:   Tubes:    A/P  Melena r/o Gastrointestinal bleeding ( S/P GI w/u in the past including capsule endoscopy - showing  gastritis / hemorrhoids / diverticular disease   Anemia - acute on chronic - H/o Iron def anemia   -serial CBC and type and screen  -GI Eval   -IV PPI gtt   Pancytopenia - possible 2/2 COVID viral infection   -serial CBC, check retic %     COVID-19 pneumonia w/ suspected bacterial pneumonia   R LL infiltrate and rales on exam   -agree w/ Rx or Ceftriaxone and Doxy  -check inflammatory markers     HTN -stable   -c/w current Rx    CKD IV - stable   -monitoring     Indeterminate L adrenal nodule   -outpatient MRI eval    DVT prophylaxis - SCD to LEs while in bed

## 2022-01-17 NOTE — ED PROVIDER NOTE - CARE PLAN
Principal Discharge DX:	Melena  Secondary Diagnosis:	Abdominal pain  Secondary Diagnosis:	Lightheadedness  Secondary Diagnosis:	2019 novel coronavirus disease (COVID-19)   1

## 2022-01-17 NOTE — H&P ADULT - ASSESSMENT
74 y F with PMH of severe aortic stenosis, HTN, morbidly obese, CKD, recurrent admission for iron infusions, blood transfusions,immuni  Immunofixation showed weak IgG lambda migrating para protein. Free light chain ratio 2.1. Normal calcium.   EGD and VCE was done. EGD revealed gastritis and VCE showed bleeding in small bowel.    EGD, Colono, VCE reviewed : no signs of active bleeding , presence of gastritis and diverticulosis.   pt presented to the ED with an episode of dark stools.  She had one episode last night , no FBPR, OLEKSANDR done in the ED was negative, no stool in the rectal vault.   pt reports shortness of breath, cough with sputum production.     #Dark stools:  #Pancytopenia  ro GI bleed.   - pt has a hx of DARRIN, follows with Dr Gaston.   - hb 8.1, plt 99, WBC 2 K  - OLEKSANDR was negative in the ED.  -CT Abdomen and Pelvis No Cont (01.17.22 @ 19:09) >  Scattered prominent periaortic, periportal mesenteric lymph nodes.   Finding is nonspecific and can represent infectious, inflammatory or   neoplastic etiologies.  Indeterminate left adrenal nodule. Consider nonemergent/outpatient   follow-up with CT adrenal protocol as clinically indicated.  - FU hematology consult. folate, b12, [id number], LDH.   - active type and screen.  - possibly due to viral cause, ro MM. pt has a hx of Gamma spike protein.   - if pt has recurrent bleeding, consider GI consult. pt has EGD and Colono in 2019 and 2020 showing gastritis, diverticulosis, grade 2 hemorrhoids. no signs of active bleeding on VCE.  - consider repeat EGD/colono.       #Covid positive:  #PNA  #Right sided opactiy on CXR  - p was diagnosed 2 weeks ago. did not receive any treatment.   - not on oxygen in the ED.   - reporting cough with sputum production, will start on ceftriaxone , Doxycycline    - QTc 498 msec on ECG done on 1/11, fu repeat ECG  - CXR showed right opacities .  - defer systemic steroids for now.   - monitor VS.   - monitor inflammatory markers q48 h.     dvt ppx: hold off for now as pt is thrombocytopenic.

## 2022-01-17 NOTE — ED PROVIDER NOTE - PHYSICAL EXAMINATION
Physical Exam    Vital Signs: I have reviewed the initial vital signs.  Constitutional: well-nourished, appears stated age, no acute distress  Eyes: Conjunctiva pink, Sclera clear  Cardiovascular: S1 and S2, regular rate, regular rhythm, well-perfused extremities, radial pulses equal and 2+, calves nonttp, equal in size  Respiratory: unlabored respiratory effort, speaking in full sentences, handling oral secretions,, clear to auscultation bilaterally no wheezing, rales and rhonchi  Gastrointestinal: soft, no guarding or distention, TTP epigastrium, no pulsatile mass, normal bowl sounds  OLEKSANDR: No stool in rectal vault.   Musculoskeletal: supple neck, no lower extremity edema, no midline tenderness, paraspinal tenderness, clavicular creptius, painful rom, moving all extreities appropriately, no gross bony deformities or swelling.  Integumentary: warm, dry, no rashes, lacerations,  Neurologic: awake, alert,
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
## 2022-01-17 NOTE — ED PROVIDER NOTE - ATTENDING CONTRIBUTION TO CARE
77 yo F pmh of iron deficiency anemia with weekly blood test and transfussion, apendectomy presents with abdominal pain and rectal bleeding. Patient had covid on the 6th, states that her symptoms have since resolved. Reports that yesterday she started to have some dark stool, reports a few episodes. No n/v. Also having some epigastric abodminal pain. no fevers. no chest pain, no shortness of breath. hematologist is Dr. Gale, GI is Dr. Robles.     CONSTITUTIONAL: Well-developed; well-nourished; in no acute distress.   SKIN: warm, dry  HEAD: Normocephalic; atraumatic.  EYES: PERRL, EOMI, no conjunctival erythema  ENT: No nasal discharge; airway clear.  NECK: Supple; non tender.  CARD: S1, S2 normal;  Regular rate and rhythm.   RESP: No wheezes, rales or rhonchi.  ABD: soft + epigastric, RUQ, LUQ tenderness, non distended, no rebound or guarding  EXT: Normal ROM.  5/5 strength in all 4 extremities   LYMPH: No acute cervical adenopathy.  NEURO: Alert, oriented, grossly unremarkable. neurovascularly intact  PSYCH: Cooperative, appropriate.

## 2022-01-17 NOTE — ED ADULT TRIAGE NOTE - CHIEF COMPLAINT QUOTE
Pt c/o dizziness lightheadedness and abd pain accompanied by rectal bleeding. Not anticoagulated. History of DARRIN received last blood transfusion 1 week ago. Also tested + for Covid on 1/6/22

## 2022-01-17 NOTE — ED ADULT NURSE NOTE - CHIEF COMPLAINT QUOTE
Андрей Humphries)  Obstetrics and Gynecology  [address] [place] [address]  Phone: [phone]  Fax: [phone]  Established Patient  Follow Up Time: 2 weeks  
Pt c/o dizziness lightheadedness and abd pain accompanied by rectal bleeding. Not anticoagulated. History of DARRIN received last blood transfusion 1 week ago. Also tested + for Covid on 1/6/22

## 2022-01-17 NOTE — ED ADULT NURSE NOTE - OBJECTIVE STATEMENT
PT c/o B/L upper abd pain with rectal bleeding. Pt recently PT discharged from unit, telemetry box removed, IV discontinued, discharge education complete dx with COVID

## 2022-01-17 NOTE — H&P ADULT - NSHPPHYSICALEXAM_GEN_ALL_CORE
T(C): 36.7 (01-17-22 @ 23:30), Max: 36.7 (01-17-22 @ 16:03)  HR: 78 (01-17-22 @ 23:30) (78 - 96)  BP: 150/72 (01-17-22 @ 23:30) (144/63 - 150/72)  RR: 18 (01-17-22 @ 23:30) (18 - 18)  SpO2: 99% (01-17-22 @ 23:30) (98% - 99%)    PHYSICAL EXAM:  GENERAL: NAD, well-developed  HEAD:  Atraumatic, Normocephalic  EYES: EOMI, PERRLA, conjunctiva and sclera clear  ENT:No nasal obstruction or discharge. No tonsillar exudate, swelling or erythema.  NECK: Supple, No JVD  CHEST/LUNG: Clear to auscultation bilaterally; No wheeze  HEART: Regular rate and rhythm; No murmurs, rubs, or gallops  ABDOMEN: Soft, Nontender, Nondistended; Bowel sounds present  EXTREMITIES:  2+ Peripheral Pulses, No clubbing, cyanosis, or edema  PSYCH: AAOx3  NEUROLOGY: non-focal  SKIN: No rashes or lesions

## 2022-01-18 LAB
A1C WITH ESTIMATED AVERAGE GLUCOSE RESULT: 4.6 % — SIGNIFICANT CHANGE UP (ref 4–5.6)
ALBUMIN SERPL ELPH-MCNC: 3.5 G/DL — SIGNIFICANT CHANGE UP (ref 3.5–5.2)
ALP SERPL-CCNC: 74 U/L — SIGNIFICANT CHANGE UP (ref 30–115)
ALT FLD-CCNC: 5 U/L — SIGNIFICANT CHANGE UP (ref 0–41)
ANION GAP SERPL CALC-SCNC: 13 MMOL/L — SIGNIFICANT CHANGE UP (ref 7–14)
APTT BLD: 36.7 SEC — SIGNIFICANT CHANGE UP (ref 27–39.2)
AST SERPL-CCNC: 13 U/L — SIGNIFICANT CHANGE UP (ref 0–41)
B2 MICROGLOB SERPL-MCNC: 7.8 MG/L — HIGH (ref 0.8–2.2)
BASOPHILS # BLD AUTO: 0.01 K/UL — SIGNIFICANT CHANGE UP (ref 0–0.2)
BASOPHILS NFR BLD AUTO: 0.4 % — SIGNIFICANT CHANGE UP (ref 0–1)
BILIRUB SERPL-MCNC: 0.7 MG/DL — SIGNIFICANT CHANGE UP (ref 0.2–1.2)
BUN SERPL-MCNC: 28 MG/DL — HIGH (ref 10–20)
CALCIUM SERPL-MCNC: 8.5 MG/DL — SIGNIFICANT CHANGE UP (ref 8.5–10.1)
CHLORIDE SERPL-SCNC: 117 MMOL/L — HIGH (ref 98–110)
CHOLEST SERPL-MCNC: 93 MG/DL — SIGNIFICANT CHANGE UP
CK SERPL-CCNC: 39 U/L — SIGNIFICANT CHANGE UP (ref 0–225)
CO2 SERPL-SCNC: 17 MMOL/L — SIGNIFICANT CHANGE UP (ref 17–32)
CREAT SERPL-MCNC: 2 MG/DL — HIGH (ref 0.7–1.5)
CRP SERPL-MCNC: 7 MG/L — HIGH
D DIMER BLD IA.RAPID-MCNC: 397 NG/ML DDU — HIGH (ref 0–230)
EOSINOPHIL # BLD AUTO: 0.15 K/UL — SIGNIFICANT CHANGE UP (ref 0–0.7)
EOSINOPHIL NFR BLD AUTO: 5.5 % — SIGNIFICANT CHANGE UP (ref 0–8)
ESTIMATED AVERAGE GLUCOSE: 85 MG/DL — SIGNIFICANT CHANGE UP (ref 68–114)
FERRITIN SERPL-MCNC: 29 NG/ML — SIGNIFICANT CHANGE UP (ref 15–150)
FOLATE SERPL-MCNC: 6.2 NG/ML — SIGNIFICANT CHANGE UP
GLUCOSE BLDC GLUCOMTR-MCNC: 104 MG/DL — HIGH (ref 70–99)
GLUCOSE BLDC GLUCOMTR-MCNC: 125 MG/DL — HIGH (ref 70–99)
GLUCOSE BLDC GLUCOMTR-MCNC: 84 MG/DL — SIGNIFICANT CHANGE UP (ref 70–99)
GLUCOSE BLDC GLUCOMTR-MCNC: 96 MG/DL — SIGNIFICANT CHANGE UP (ref 70–99)
GLUCOSE SERPL-MCNC: 93 MG/DL — SIGNIFICANT CHANGE UP (ref 70–99)
HCT VFR BLD CALC: 27.1 % — LOW (ref 37–47)
HDLC SERPL-MCNC: 38 MG/DL — LOW
HGB BLD-MCNC: 7.8 G/DL — LOW (ref 12–16)
IMM GRANULOCYTES NFR BLD AUTO: 0 % — LOW (ref 0.1–0.3)
INR BLD: 1.03 RATIO — SIGNIFICANT CHANGE UP (ref 0.65–1.3)
LDH SERPL L TO P-CCNC: 187 — SIGNIFICANT CHANGE UP (ref 50–242)
LIPID PNL WITH DIRECT LDL SERPL: 38 MG/DL — SIGNIFICANT CHANGE UP
LYMPHOCYTES # BLD AUTO: 0.69 K/UL — LOW (ref 1.2–3.4)
LYMPHOCYTES # BLD AUTO: 25.4 % — SIGNIFICANT CHANGE UP (ref 20.5–51.1)
MCHC RBC-ENTMCNC: 26.4 PG — LOW (ref 27–31)
MCHC RBC-ENTMCNC: 28.8 G/DL — LOW (ref 32–37)
MCV RBC AUTO: 91.9 FL — SIGNIFICANT CHANGE UP (ref 81–99)
MONOCYTES # BLD AUTO: 0.35 K/UL — SIGNIFICANT CHANGE UP (ref 0.1–0.6)
MONOCYTES NFR BLD AUTO: 12.9 % — HIGH (ref 1.7–9.3)
NEUTROPHILS # BLD AUTO: 1.52 K/UL — SIGNIFICANT CHANGE UP (ref 1.4–6.5)
NEUTROPHILS NFR BLD AUTO: 55.8 % — SIGNIFICANT CHANGE UP (ref 42.2–75.2)
NON HDL CHOLESTEROL: 55 MG/DL — SIGNIFICANT CHANGE UP
NRBC # BLD: 0 /100 WBCS — SIGNIFICANT CHANGE UP (ref 0–0)
NT-PROBNP SERPL-SCNC: 668 PG/ML — HIGH (ref 0–300)
PLATELET # BLD AUTO: 91 K/UL — LOW (ref 130–400)
POTASSIUM SERPL-MCNC: 5.4 MMOL/L — HIGH (ref 3.5–5)
POTASSIUM SERPL-SCNC: 5.4 MMOL/L — HIGH (ref 3.5–5)
PROCALCITONIN SERPL-MCNC: 0.08 NG/ML — SIGNIFICANT CHANGE UP (ref 0.02–0.1)
PROT SERPL-MCNC: 6.5 G/DL — SIGNIFICANT CHANGE UP (ref 6–8)
PROTHROM AB SERPL-ACNC: 11.8 SEC — SIGNIFICANT CHANGE UP (ref 9.95–12.87)
RBC # BLD: 2.95 M/UL — LOW (ref 4.2–5.4)
RBC # FLD: 16.9 % — HIGH (ref 11.5–14.5)
SODIUM SERPL-SCNC: 147 MMOL/L — HIGH (ref 135–146)
TRIGL SERPL-MCNC: 114 MG/DL — SIGNIFICANT CHANGE UP
VIT B12 SERPL-MCNC: 345 PG/ML — SIGNIFICANT CHANGE UP (ref 232–1245)
WBC # BLD: 2.72 K/UL — LOW (ref 4.8–10.8)
WBC # FLD AUTO: 2.72 K/UL — LOW (ref 4.8–10.8)

## 2022-01-18 PROCEDURE — 99222 1ST HOSP IP/OBS MODERATE 55: CPT

## 2022-01-18 PROCEDURE — 99233 SBSQ HOSP IP/OBS HIGH 50: CPT

## 2022-01-18 PROCEDURE — 93010 ELECTROCARDIOGRAM REPORT: CPT

## 2022-01-18 RX ORDER — INSULIN LISPRO 100/ML
VIAL (ML) SUBCUTANEOUS
Refills: 0 | Status: DISCONTINUED | OUTPATIENT
Start: 2022-01-18 | End: 2022-01-24

## 2022-01-18 RX ORDER — SODIUM CHLORIDE 9 MG/ML
1000 INJECTION, SOLUTION INTRAVENOUS
Refills: 0 | Status: DISCONTINUED | OUTPATIENT
Start: 2022-01-18 | End: 2022-01-24

## 2022-01-18 RX ORDER — HEPARIN SODIUM 5000 [USP'U]/ML
5000 INJECTION INTRAVENOUS; SUBCUTANEOUS EVERY 12 HOURS
Refills: 0 | Status: DISCONTINUED | OUTPATIENT
Start: 2022-01-18 | End: 2022-01-24

## 2022-01-18 RX ORDER — DEXTROSE 50 % IN WATER 50 %
25 SYRINGE (ML) INTRAVENOUS ONCE
Refills: 0 | Status: DISCONTINUED | OUTPATIENT
Start: 2022-01-18 | End: 2022-01-21

## 2022-01-18 RX ORDER — ACETAMINOPHEN 500 MG
650 TABLET ORAL EVERY 6 HOURS
Refills: 0 | Status: DISCONTINUED | OUTPATIENT
Start: 2022-01-18 | End: 2022-01-24

## 2022-01-18 RX ORDER — GLUCAGON INJECTION, SOLUTION 0.5 MG/.1ML
1 INJECTION, SOLUTION SUBCUTANEOUS ONCE
Refills: 0 | Status: DISCONTINUED | OUTPATIENT
Start: 2022-01-18 | End: 2022-01-24

## 2022-01-18 RX ORDER — LANOLIN ALCOHOL/MO/W.PET/CERES
3 CREAM (GRAM) TOPICAL AT BEDTIME
Refills: 0 | Status: DISCONTINUED | OUTPATIENT
Start: 2022-01-18 | End: 2022-01-24

## 2022-01-18 RX ORDER — ONDANSETRON 8 MG/1
4 TABLET, FILM COATED ORAL EVERY 8 HOURS
Refills: 0 | Status: DISCONTINUED | OUTPATIENT
Start: 2022-01-18 | End: 2022-01-24

## 2022-01-18 RX ORDER — CEFTRIAXONE 500 MG/1
1000 INJECTION, POWDER, FOR SOLUTION INTRAMUSCULAR; INTRAVENOUS EVERY 24 HOURS
Refills: 0 | Status: DISCONTINUED | OUTPATIENT
Start: 2022-01-18 | End: 2022-01-19

## 2022-01-18 RX ORDER — ERYTHROPOIETIN 10000 [IU]/ML
20000 INJECTION, SOLUTION INTRAVENOUS; SUBCUTANEOUS ONCE
Refills: 0 | Status: COMPLETED | OUTPATIENT
Start: 2022-01-18 | End: 2022-01-18

## 2022-01-18 RX ORDER — METOPROLOL TARTRATE 50 MG
25 TABLET ORAL
Refills: 0 | Status: DISCONTINUED | OUTPATIENT
Start: 2022-01-18 | End: 2022-01-24

## 2022-01-18 RX ORDER — DEXTROSE 50 % IN WATER 50 %
15 SYRINGE (ML) INTRAVENOUS ONCE
Refills: 0 | Status: DISCONTINUED | OUTPATIENT
Start: 2022-01-18 | End: 2022-01-24

## 2022-01-18 RX ORDER — SODIUM ZIRCONIUM CYCLOSILICATE 10 G/10G
10 POWDER, FOR SUSPENSION ORAL ONCE
Refills: 0 | Status: COMPLETED | OUTPATIENT
Start: 2022-01-18 | End: 2022-01-18

## 2022-01-18 RX ADMIN — Medication 25 MILLIGRAM(S): at 05:48

## 2022-01-18 RX ADMIN — CEFTRIAXONE 100 MILLIGRAM(S): 500 INJECTION, POWDER, FOR SOLUTION INTRAMUSCULAR; INTRAVENOUS at 05:41

## 2022-01-18 RX ADMIN — Medication 110 MILLIGRAM(S): at 05:48

## 2022-01-18 NOTE — CONSULT NOTE ADULT - SUBJECTIVE AND OBJECTIVE BOX
Patient is a 76y old  Female who presents with a chief complaint of     HPI:  74 y F with PMH of severe aortic stenosis, HTN, morbidly obese, CKD, recurrent admission for iron infusions, blood transfusions,immuni  Immunofixation showed weak IgG lambda migrating para protein. Free light chain ratio 2.1. Normal calcium.   EGD and VCE was done. EGD revealed gastritis and VCE showed bleeding in small bowel.    presented to the ED with an episode of dark stools.  She had one episode last night , no FBPR, OLEKSANDR done in the ED was negative, no stool in the rectal vault.   pt reports shortness of breath, cough with sputum production.     in the ED,pt's VS T(C): 36.7 (01-17-22 @ 23:30), Max: 36.7 (01-17-22 @ 16:03)  HR: 78 (01-17-22 @ 23:30) (78 - 96)  BP: 150/72 (01-17-22 @ 23:30) (144/63 - 150/72)  RR: 18 (01-17-22 @ 23:30) (18 - 18)  SpO2: 99% (01-17-22 @ 23:30) (98% - 99%), labs done showed COVID-19 PCR: Detected, Hb: 8.1WBC Count: 2.96 Platelet Count: 99 Creatinine: 2.0  on PE, pt had bruising on left upper extremity, new per the patient.   CXR showed right opacities .  CT Abdomen and Pelvis No Cont (01.17.22 @ 19:09) >  Scattered prominent periaortic, periportal mesenteric lymph nodes.   Finding is nonspecific and can represent infectious, inflammatory or   neoplastic etiologies.  Indeterminate left adrenal nodule. Consider nonemergent/outpatient   follow-up with CT adrenal protocol as clinically indicated.    pt received Famotidine, insulin for hyperkalemia, NS 1 L. on room air.   pt is admitted for workup/management (17 Jan 2022 23:50)         PAST MEDICAL & SURGICAL HISTORY:  HTN (hypertension)  Heart murmur  Eczema  Anemia  H/O appendicitis  H/O cholecystitis    SOCIAL HISTORY:    FAMILY HISTORY:  No pertinent family history in first degree relatives      Allergies    aspirin (Rash)  latex (Unknown)  penicillins (Unknown)    Intolerances      ROS:  Negative except for:      Height (cm): 170.2 (01-17-22 @ 16:03)      HOME MEDICATIONS:  Metoprolol Tartrate 25 mg oral tablet: 1 tab(s) orally 2 times a day (18 Jan 2022 00:11)      Vital Signs Last 24 Hrs  T(C): 36.9 (18 Jan 2022 07:52), Max: 36.9 (18 Jan 2022 07:52)  T(F): 98.4 (18 Jan 2022 07:52), Max: 98.4 (18 Jan 2022 07:52)  HR: 70 (18 Jan 2022 07:52) (70 - 96)  BP: 128/77 (18 Jan 2022 07:52) (128/77 - 150/72)  BP(mean): --  RR: 20 (18 Jan 2022 07:52) (18 - 20)  SpO2: 99% (18 Jan 2022 07:52) (98% - 99%)    PHYSICAL EXAM  General: adult in NAD  HEENT: clear oropharynx, anicteric sclera, pink conjunctiva  Neck: supple  CV: normal S1/S2 with no murmur rubs or gallops  Lungs: positive air movement b/l ant lungs,clear to auscultation, no wheezes, no rales  Abdomen: soft non-tender non-distended, no hepatosplenomegaly  Ext: no clubbing cyanosis or edema  Skin: no rashes and no petechiae  Neuro: alert and oriented X 4, no focal deficits    MEDICATIONS:  cefTRIAXone   IVPB 1000 milliGRAM(s) IV Intermittent every 24 hours  dextrose 40% Gel 15 Gram(s) Oral once  dextrose 5%. 1000 milliLiter(s) (50 mL/Hr) IV Continuous <Continuous>  dextrose 50% Injectable 25 Gram(s) IV Push once  doxycycline IVPB 100 milliGRAM(s) IV Intermittent every 12 hours  doxycycline IVPB      glucagon  Injectable 1 milliGRAM(s) IntraMuscular once  insulin lispro (ADMELOG) corrective regimen sliding scale   SubCutaneous three times a day before meals  metoprolol tartrate 25 milliGRAM(s) Oral two times a day  acetaminophen     Tablet .. 650 milliGRAM(s) Oral every 6 hours PRN Temp greater or equal to 38C (100.4F), Mild Pain (1 - 3)  aluminum hydroxide/magnesium hydroxide/simethicone Suspension 30 milliLiter(s) Oral every 4 hours PRN Dyspepsia  melatonin 3 milliGRAM(s) Oral at bedtime PRN Insomnia  ondansetron Injectable 4 milliGRAM(s) IV Push every 8 hours PRN Nausea and/or Vomiting      LABS:                          7.8    2.72  )-----------( 91       ( 18 Jan 2022 06:03 )             27.1         Mean Cell Volume : 91.9 fL  Mean Cell Hemoglobin : 26.4 pg  Mean Cell Hemoglobin Concentration : 28.8 g/dL  Auto Neutrophil # : 1.52 K/uL  Auto Lymphocyte # : 0.69 K/uL  Auto Monocyte # : 0.35 K/uL  Auto Eosinophil # : 0.15 K/uL  Auto Basophil # : 0.01 K/uL  Auto Neutrophil % : 55.8 %  Auto Lymphocyte % : 25.4 %  Auto Monocyte % : 12.9 %  Auto Eosinophil % : 5.5 %  Auto Basophil % : 0.4 %      Serial CBC's  01-18 @ 06:03  Hct-27.1 / Hgb-7.8 / Plat-91 / RBC-2.95 / WBC-2.72  Serial CBC's  01-17 @ 17:24  Hct-27.4 / Hgb-8.1 / Plat-99 / RBC-3.02 / WBC-2.96      01-17    145  |  115<H>  |  27<H>  ----------------------------<  69<L>  5.6<H>   |  18  |  2.0<H>    Ca    8.5      17 Jan 2022 17:24    TPro  6.5  /  Alb  3.7  /  TBili  0.8  /  DBili  x   /  AST  15  /  ALT  6   /  AlkPhos  78  01-17      PT/INR - ( 18 Jan 2022 06:03 )   PT: 11.80 sec;   INR: 1.03 ratio    PTT - ( 18 Jan 2022 06:03 )  PTT:36.7 sec    Iron with Total Binding Capacity in AM (01.08.20 @ 08:07)    Iron - Total Binding Capacity.: 372 ug/dL    % Saturation, Iron: 5 %    Iron Total, Serum: 20 ug/dL    Unsaturated Iron Binding Capacity: 352 ug/dL    BLOOD SMEAR INTERPRETATION:       RADIOLOGY & ADDITIONAL STUDIES:    < from: Xray Chest 1 View- PORTABLE-Urgent (01.17.22 @ 21:34) >  Impression:    Low lung volume.    Right lung opacity, worse.    < from: CT Abdomen and Pelvis No Cont (01.17.22 @ 19:09) >    IMPRESSION:    Scattered prominent periaortic, periportal mesenteric lymph nodes.   Finding is nonspecific and can represent infectious, inflammatory or   neoplastic etiologies.    Indeterminate left adrenal nodule. Consider nonemergent/outpatient   follow-up with CT adrenal protocol as clinically indicated.    --- End of Report ---

## 2022-01-18 NOTE — PROGRESS NOTE ADULT - ATTENDING COMMENTS
Patient seen and examined. Plan of care discussed with resident physician.     < from: CT Abdomen and Pelvis No Cont (01.17.22 @ 19:09) >  Scattered prominent periaortic, periportal mesenteric lymph nodes.   Finding is nonspecific and can represent infectious, inflammatory or   neoplastic etiologies.  Indeterminate left adrenal nodule. Consider nonemergent/outpatient   follow-up with CT adrenal protocol as clinically indicated.  < end of copied text >    < from: Xray Chest 1 View- PORTABLE-Urgent (01.17.22 @ 21:34) >  Impression:  Low lung volume.  Right lung opacity, worse.  < end of copied text >    A&P  Agree with assessment and plan above    # dark stools  no episodes today so far  monitor hg    # chronic anemia, DARRIN  # pancytopenia  hematologist evaluated and recommendations reviewed. plan for retacrip today    # COVID pneumonia  no respiratory symptoms  check procal; if neg will stop antibiotics    # generalized weakness secondary to COVID infection Patient seen and examined. Plan of care discussed with resident physician.     < from: CT Abdomen and Pelvis No Cont (01.17.22 @ 19:09) >  Scattered prominent periaortic, periportal mesenteric lymph nodes.   Finding is nonspecific and can represent infectious, inflammatory or   neoplastic etiologies.  Indeterminate left adrenal nodule. Consider nonemergent/outpatient   follow-up with CT adrenal protocol as clinically indicated.  < end of copied text >    < from: Xray Chest 1 View- PORTABLE-Urgent (01.17.22 @ 21:34) >  Impression:  Low lung volume.  Right lung opacity, worse.  < end of copied text >    A&P  Agree with assessment and plan above    # dark stools  no episodes today so far  monitor hg    # chronic anemia, DARRIN  # pancytopenia  hematologist evaluated and recommendations reviewed. plan for retacrip today    # COVID pneumonia  no respiratory symptoms  check procal; if neg will stop antibiotics    # generalized weakness secondary to COVID infection    # incidental finding of  prominent periaortic, periportal mesenteric lymph nodes.   Follow up with hemato oncologist for further evaluation as outpatient    #Indeterminate left adrenal nodule- Plan outpatient follow-up with CT adrenal protocol

## 2022-01-18 NOTE — CONSULT NOTE ADULT - ATTENDING COMMENTS
The patient was seen. Agree with above.  Leukopenia and thrombocytopenia likely related to COVID19 infection.  Chronic anemia, iron deficiency and CKD, received Venofer and EPO injection as out patient.   Continue supportive care.

## 2022-01-18 NOTE — CONSULT NOTE ADULT - ASSESSMENT
75 yo F with PMHx of aortic stenosis, HTN, obesity, CKD stage 4, transfusion dependent anemia, presented for rectal bleeding. Pt also reported shortness of breath with cough/sputum production and tested positive for COVID-19.     Hem/Onc consulted for pancytopenia.     #Chronic pancytopenia  #Hx of chronic anemia (likely due to CKD4 and obscure GI bleed), was on retacrit as O/P and received 2U PRBC on 12/30/21  -EGD 2020 revealed gastritis and VCE showed bleeding in small bowel.   -Pt also has covid-19 pna which can contribute to pancytopenia   -Peripheral smear:  -Workup so far:     Iron serum: 43 (9/2021)    TIBC 286    Ferritin 26 (in 11/2021)   Protein Electrophoresis, Serum (01.08.20 @ 11:09)    Protein Total, Serum: 6.9 g/dL    Serum Protein Electrophoresis Interp: Weak Gamma-Migrating Paraprotein Identified   Immunoglobulin Free Light Chains, Serum (01.10.20 @ 07:02)    FABY Kappa: 12.18 mg/dL    FABY Lambda: 5.84 mg/dL (01.10.20 @ 07:02)    [place]/Lambda Free Light Chain Ratio, Serum: 2.09 Ratio   No splenomegaly on CT    RECOMMENDATIONS:    -Pt has evidence of iron deficiency per last ferritin study.   -Repeat iron studies including serum Iron, TIBC, ferritin), reticulocyte count, LDH, haptoglobin, vit b12, folate   -Check myeloma panel (SPEP, UPEP, serum IF, FLC, Ig levels)  -Continue Retacrit 19103B weekly   -Treat underlying infection   -GI follow up   -Pharmacologic DVt ppx if plt >30k  -Follow up with Dr Gaston as O/P                         75 yo F with PMHx of aortic stenosis, HTN, obesity, CKD stage 4, transfusion dependent anemia, presented for rectal bleeding. Pt also reported shortness of breath with cough/sputum production and tested positive for COVID-19.     Hem/Onc consulted for pancytopenia.     #Chronic pancytopenia  #Hx of chronic anemia (likely due to CKD4 and obscure GI bleed), was on retacrit as O/P and received 2U PRBC on 12/30/21. Pt received Venofer 2 weeks ago.   -EGD 2020 revealed gastritis and VCE showed bleeding in small bowel.   -Pt also has covid-19 pna which can contribute to pancytopenia   -Peripheral smear:  -Workup so far:     Iron serum: 43 (9/2021)    TIBC 286    Ferritin 26 (in 11/2021)   Protein Electrophoresis, Serum (01.08.20 @ 11:09)    Protein Total, Serum: 6.9 g/dL    Serum Protein Electrophoresis Interp: Weak Gamma-Migrating Paraprotein Identified   Immunoglobulin Free Light Chains, Serum (01.10.20 @ 07:02)    FABY Kappa: 12.18 mg/dL    FABY Lambda: 5.84 mg/dL (01.10.20 @ 07:02)    [place]/Lambda Free Light Chain Ratio, Serum: 2.09 Ratio   No splenomegaly on CT    RECOMMENDATIONS:    -Pt has evidence of iron deficiency per last ferritin study.   -Repeat iron studies including serum Iron, TIBC, ferritin), reticulocyte count, LDH, haptoglobin, vit b12, folate   -Check myeloma panel (SPEP, UPEP, serum IF, FLC, Ig levels)  -Continue Retacrit 66356Q weekly (can give today)  -Treat underlying infection   -GI follow up   -Pharmacologic DVT ppx if plt >30k  -Follow up with Dr Gaston as O/P                         73 yo F with PMHx of aortic stenosis, HTN, obesity, CKD stage 4, transfusion dependent anemia, presented for rectal bleeding. Pt also reported shortness of breath with cough/sputum production and tested positive for COVID-19.     Hem/Onc consulted for pancytopenia.     #Chronic pancytopenia  #Hx of chronic anemia (likely due to CKD4 and obscure GI bleed), was on retacrit as O/P and received 2U PRBC on 12/30/21. Pt received Venofer 2 weeks ago.   -EGD 2020 revealed gastritis and VCE showed bleeding in small bowel.   -Pt also has covid-19 pna which can contribute to pancytopenia   -Peripheral smear:  -Workup so far:     Iron serum: 43 (9/2021)    TIBC 286    Ferritin 26 (in 11/2021)   Protein Electrophoresis, Serum (01.08.20 @ 11:09)    Protein Total, Serum: 6.9 g/dL    Serum Protein Electrophoresis Interp: Weak Gamma-Migrating Paraprotein Identified   Immunoglobulin Free Light Chains, Serum (01.10.20 @ 07:02)    FABY Kappa: 12.18 mg/dL    FABY Lambda: 5.84 mg/dL (01.10.20 @ 07:02)    [place]/Lambda Free Light Chain Ratio, Serum: 2.09 Ratio   No splenomegaly on CT    RECOMMENDATIONS:    -Pt has evidence of iron deficiency per last ferritin study and is receiving Venofer as O/P.   -Repeat iron studies, reticulocyte count, LDH, haptoglobin, vit b12, folate   -Check myeloma panel (SPEP, UPEP, serum IF, FLC, Ig levels)  -Continue Retacrit 35794I weekly (can give today)  -Treat underlying infection   -GI follow up   -Pharmacologic DVT ppx if plt >30k  -Follow up with Dr Gaston as O/P

## 2022-01-18 NOTE — PROGRESS NOTE ADULT - SUBJECTIVE AND OBJECTIVE BOX
LATRICIA ARREAGA 76y Female  MRN#: [id number]   Hospital Day: 1d    SUBJECTIVE  Patient is a 76y old Female who presents with a chief complaint of Currently admitted to medicine with the primary diagnosis of Melena    INTERVAL HPI AND OVERNIGHT EVENTS:  Patient was examined and seen at bedside. This morning she is resting comfortably in bed and reports no issues or overnight events.    OBJECTIVE  PAST MEDICAL & SURGICAL HISTORY  HTN (hypertension)  Heart murmur  Eczema  Anemia  H/O appendicitis  H/O cholecystitis    ALLERGIES:  aspirin (Rash)  latex (Unknown)  penicillins (Unknown)    MEDICATIONS:  STANDING MEDICATIONS  cefTRIAXone   IVPB 1000 milliGRAM(s) IV Intermittent every 24 hours  dextrose 40% Gel 15 Gram(s) Oral once  dextrose 5%. 1000 milliLiter(s) IV Continuous <Continuous>  dextrose 50% Injectable 25 Gram(s) IV Push once  doxycycline IVPB 100 milliGRAM(s) IV Intermittent every 12 hours  doxycycline IVPB      epoetin raquel-epbx (RETACRIT) Injectable 57782 Unit(s) IV Push once  glucagon  Injectable 1 milliGRAM(s) IntraMuscular once  heparin   Injectable 5000 Unit(s) SubCutaneous every 12 hours  insulin lispro (ADMELOG) corrective regimen sliding scale   SubCutaneous three times a day before meals  metoprolol tartrate 25 milliGRAM(s) Oral two times a day    PRN MEDICATIONS  acetaminophen     Tablet .. 650 milliGRAM(s) Oral every 6 hours PRN  aluminum hydroxide/magnesium hydroxide/simethicone Suspension 30 milliLiter(s) Oral every 4 hours PRN  melatonin 3 milliGRAM(s) Oral at bedtime PRN  ondansetron Injectable 4 milliGRAM(s) IV Push every 8 hours PRN    VITAL SIGNS: Last 24 Hours  T(C): 36.9 (18 Jan 2022 07:52), Max: 36.9 (18 Jan 2022 07:52)  T(F): 98.4 (18 Jan 2022 07:52), Max: 98.4 (18 Jan 2022 07:52)  HR: 70 (18 Jan 2022 07:52) (70 - 96)  BP: 128/77 (18 Jan 2022 07:52) (128/77 - 150/72)  BP(mean): --  RR: 20 (18 Jan 2022 07:52) (18 - 20)  SpO2: 99% (18 Jan 2022 07:52) (98% - 99%)    LABS:                        7.8    2.72  )-----------( 91       ( 18 Jan 2022 06:03 )             27.1   01-18  147<H>  |  117<H>  |  28<H>  ----------------------------<  93  5.4<H>   |  17  |  2.0<H>  Ca    8.5      18 Jan 2022 06:03  TPro  6.5  /  Alb  3.5  /  TBili  0.7  /  DBili  x   /  AST  13  /  ALT  5   /  AlkPhos  74  01-18  PT/INR - ( 18 Jan 2022 06:03 )   PT: 11.80 sec;   INR: 1.03 ratio    PTT - ( 18 Jan 2022 06:03 )  PTT:36.7 sec    Creatine Kinase, Serum: 39 U/L (01-18-22 @ 06:03)  Troponin T, Serum: <0.01 ng/mL (01-17-22 @ 17:24)    CARDIAC MARKERS ( 18 Jan 2022 06:03 )  x     / x     / 39 U/L / x     / x      CARDIAC MARKERS ( 17 Jan 2022 17:24 )  x     / <0.01 ng/mL / x     / x     / x        PHYSICAL EXAM:  CONSTITUTIONAL: No acute distress, well-developed, well-groomed, AAOx3  HEAD: Atraumatic, normocephalic  PULMONARY: Clear to auscultation bilaterally  CARDIOVASCULAR: Regular rate and rhythm  GASTROINTESTINAL: Soft, non-tender, non-distended  MUSCULOSKELETAL: no edema  NEUROLOGY: non-focal  SKIN: No rashes or lesions    ASSESSMENT & PLAN74 y F with PMH of severe aortic stenosis, HTN, morbidly obese, CKD, recurrent admission for iron infusions, blood transfusions,immuni  Immunofixation showed weak IgG lambda migrating para protein. Free light chain ratio 2.1. Normal calcium. EGD and VCE was done. EGD revealed gastritis and VCE showed bleeding in small bowel.    EGD, Colono, VCE reviewed : no signs of active bleeding , presence of gastritis and diverticulosis.   pt presented to the ED with an episode of dark stools.  She had one episode last night , no FBPR, OLEKSANDR done in the ED was negative, no stool in the rectal vault. pt reports shortness of breath, cough with sputum production.     #Dark stools:  ro GI bleed.   - pt has a hx of DARRIN, follows with Dr Gaston.   - hb 8.1, plt 99, WBC 2 K  - OLEKSANDR was negative in the ED.  -CT Abdomen and Pelvis No Cont (01.17.22 @ 19:09) >Scattered prominent periaortic, periportal mesenteric lymph nodes. Finding is nonspecific and can represent infectious, inflammatory or neoplastic etiologies. Indeterminate left adrenal nodule. Consider nonemergent/outpatient follow-up with CT adrenal protocol as clinically indicated.  - active type and screen.  - possibly due to viral cause, ro MM. pt has a hx of Gamma spike protein.   - if pt has recurrent bleeding, consider GI consult. pt has EGD and Colono in 2019 and 2020 showing gastritis, diverticulosis, grade 2 hemorrhoids. no signs of active bleeding on VCE.  - consider repeat EGD/colono.     #DARRIN  #Pancytopenia  -Repeat iron studies including serum Iron, TIBC, ferritin), reticulocyte count, LDH, haptoglobin, vit b12, folate (ordered for 1/18)  -Check myeloma panel (SPEP, UPEP, serum IF, FLC, Ig levels) (ordered for 1/18)  -Continue Retacrit [id number] weekly --> dose given 1/18  -Pharmacologic DVT ppx if plt >30k , starting on heparin PPX     #Covid positive:  #PNA  #Right sided opactiy on CXR  - p was diagnosed 2 weeks ago. did not receive any treatment.   - not on oxygen in the ED.   - c/w Doxycycline & rocephin    - QTc 498 msec on ECG done on 1/11, fu repeat ECG  - defer systemic steroids for now.   - monitor VS.   - monitor inflammatory markers q48 h.     dvt ppx: okay as long as plt >30, starting on heparin ppx

## 2022-01-19 ENCOUNTER — NON-APPOINTMENT (OUTPATIENT)
Age: 77
End: 2022-01-19

## 2022-01-19 LAB
ALBUMIN SERPL ELPH-MCNC: 3.4 G/DL — LOW (ref 3.5–5.2)
ALP SERPL-CCNC: 71 U/L — SIGNIFICANT CHANGE UP (ref 30–115)
ALT FLD-CCNC: <5 U/L — SIGNIFICANT CHANGE UP (ref 0–41)
ANION GAP SERPL CALC-SCNC: 11 MMOL/L — SIGNIFICANT CHANGE UP (ref 7–14)
AST SERPL-CCNC: 13 U/L — SIGNIFICANT CHANGE UP (ref 0–41)
BASOPHILS # BLD AUTO: 0.01 K/UL — SIGNIFICANT CHANGE UP (ref 0–0.2)
BASOPHILS # BLD AUTO: 0.01 K/UL — SIGNIFICANT CHANGE UP (ref 0–0.2)
BASOPHILS NFR BLD AUTO: 0.4 % — SIGNIFICANT CHANGE UP (ref 0–1)
BASOPHILS NFR BLD AUTO: 0.4 % — SIGNIFICANT CHANGE UP (ref 0–1)
BILIRUB SERPL-MCNC: 0.9 MG/DL — SIGNIFICANT CHANGE UP (ref 0.2–1.2)
BLD GP AB SCN SERPL QL: SIGNIFICANT CHANGE UP
BUN SERPL-MCNC: 30 MG/DL — HIGH (ref 10–20)
CALCIUM SERPL-MCNC: 8.7 MG/DL — SIGNIFICANT CHANGE UP (ref 8.5–10.1)
CHLORIDE SERPL-SCNC: 116 MMOL/L — HIGH (ref 98–110)
CO2 SERPL-SCNC: 18 MMOL/L — SIGNIFICANT CHANGE UP (ref 17–32)
CREAT SERPL-MCNC: 1.9 MG/DL — HIGH (ref 0.7–1.5)
EOSINOPHIL # BLD AUTO: 0.14 K/UL — SIGNIFICANT CHANGE UP (ref 0–0.7)
EOSINOPHIL # BLD AUTO: 0.15 K/UL — SIGNIFICANT CHANGE UP (ref 0–0.7)
EOSINOPHIL NFR BLD AUTO: 5.3 % — SIGNIFICANT CHANGE UP (ref 0–8)
EOSINOPHIL NFR BLD AUTO: 5.5 % — SIGNIFICANT CHANGE UP (ref 0–8)
FERRITIN SERPL-MCNC: 28 NG/ML — SIGNIFICANT CHANGE UP (ref 15–150)
FOLATE SERPL-MCNC: 6.9 NG/ML — SIGNIFICANT CHANGE UP
GLUCOSE BLDC GLUCOMTR-MCNC: 105 MG/DL — HIGH (ref 70–99)
GLUCOSE BLDC GLUCOMTR-MCNC: 126 MG/DL — HIGH (ref 70–99)
GLUCOSE BLDC GLUCOMTR-MCNC: 90 MG/DL — SIGNIFICANT CHANGE UP (ref 70–99)
GLUCOSE BLDC GLUCOMTR-MCNC: 92 MG/DL — SIGNIFICANT CHANGE UP (ref 70–99)
GLUCOSE BLDC GLUCOMTR-MCNC: 97 MG/DL — SIGNIFICANT CHANGE UP (ref 70–99)
GLUCOSE SERPL-MCNC: 84 MG/DL — SIGNIFICANT CHANGE UP (ref 70–99)
HCT VFR BLD CALC: 25.7 % — LOW (ref 37–47)
HCT VFR BLD CALC: 26.3 % — LOW (ref 37–47)
HGB BLD-MCNC: 7.6 G/DL — LOW (ref 12–16)
HGB BLD-MCNC: 7.7 G/DL — LOW (ref 12–16)
IMM GRANULOCYTES NFR BLD AUTO: 0 % — LOW (ref 0.1–0.3)
IMM GRANULOCYTES NFR BLD AUTO: 0.4 % — HIGH (ref 0.1–0.3)
LDH SERPL L TO P-CCNC: 173 — SIGNIFICANT CHANGE UP (ref 50–242)
LYMPHOCYTES # BLD AUTO: 0.62 K/UL — LOW (ref 1.2–3.4)
LYMPHOCYTES # BLD AUTO: 0.66 K/UL — LOW (ref 1.2–3.4)
LYMPHOCYTES # BLD AUTO: 23.7 % — SIGNIFICANT CHANGE UP (ref 20.5–51.1)
LYMPHOCYTES # BLD AUTO: 24.4 % — SIGNIFICANT CHANGE UP (ref 20.5–51.1)
MAGNESIUM SERPL-MCNC: 2.1 MG/DL — SIGNIFICANT CHANGE UP (ref 1.8–2.4)
MCHC RBC-ENTMCNC: 26.8 PG — LOW (ref 27–31)
MCHC RBC-ENTMCNC: 27 PG — SIGNIFICANT CHANGE UP (ref 27–31)
MCHC RBC-ENTMCNC: 29.3 G/DL — LOW (ref 32–37)
MCHC RBC-ENTMCNC: 29.6 G/DL — LOW (ref 32–37)
MCV RBC AUTO: 91.5 FL — SIGNIFICANT CHANGE UP (ref 81–99)
MCV RBC AUTO: 91.6 FL — SIGNIFICANT CHANGE UP (ref 81–99)
MONOCYTES # BLD AUTO: 0.36 K/UL — SIGNIFICANT CHANGE UP (ref 0.1–0.6)
MONOCYTES # BLD AUTO: 0.4 K/UL — SIGNIFICANT CHANGE UP (ref 0.1–0.6)
MONOCYTES NFR BLD AUTO: 13.7 % — HIGH (ref 1.7–9.3)
MONOCYTES NFR BLD AUTO: 14.8 % — HIGH (ref 1.7–9.3)
NEUTROPHILS # BLD AUTO: 1.48 K/UL — SIGNIFICANT CHANGE UP (ref 1.4–6.5)
NEUTROPHILS # BLD AUTO: 1.49 K/UL — SIGNIFICANT CHANGE UP (ref 1.4–6.5)
NEUTROPHILS NFR BLD AUTO: 54.5 % — SIGNIFICANT CHANGE UP (ref 42.2–75.2)
NEUTROPHILS NFR BLD AUTO: 56.9 % — SIGNIFICANT CHANGE UP (ref 42.2–75.2)
NRBC # BLD: 0 /100 WBCS — SIGNIFICANT CHANGE UP (ref 0–0)
NRBC # BLD: 0 /100 WBCS — SIGNIFICANT CHANGE UP (ref 0–0)
PLATELET # BLD AUTO: 104 K/UL — LOW (ref 130–400)
PLATELET # BLD AUTO: 94 K/UL — LOW (ref 130–400)
POTASSIUM SERPL-MCNC: 5.5 MMOL/L — HIGH (ref 3.5–5)
POTASSIUM SERPL-SCNC: 5.5 MMOL/L — HIGH (ref 3.5–5)
PROT SERPL-MCNC: 6 G/DL — SIGNIFICANT CHANGE UP (ref 6–8.3)
PROT SERPL-MCNC: 6 G/DL — SIGNIFICANT CHANGE UP (ref 6–8.3)
PROT SERPL-MCNC: 6.2 G/DL — SIGNIFICANT CHANGE UP (ref 6–8)
RBC # BLD: 2.81 M/UL — LOW (ref 4.2–5.4)
RBC # BLD: 2.81 M/UL — LOW (ref 4.2–5.4)
RBC # BLD: 2.87 M/UL — LOW (ref 4.2–5.4)
RBC # FLD: 17.1 % — HIGH (ref 11.5–14.5)
RBC # FLD: 17.2 % — HIGH (ref 11.5–14.5)
RETICS #: 77 K/UL — SIGNIFICANT CHANGE UP (ref 25–125)
RETICS/RBC NFR: 2.8 % — HIGH (ref 0.5–1.5)
SODIUM SERPL-SCNC: 145 MMOL/L — SIGNIFICANT CHANGE UP (ref 135–146)
VIT B12 SERPL-MCNC: 284 PG/ML — SIGNIFICANT CHANGE UP (ref 232–1245)
WBC # BLD: 2.62 K/UL — LOW (ref 4.8–10.8)
WBC # BLD: 2.71 K/UL — LOW (ref 4.8–10.8)
WBC # FLD AUTO: 2.62 K/UL — LOW (ref 4.8–10.8)
WBC # FLD AUTO: 2.71 K/UL — LOW (ref 4.8–10.8)

## 2022-01-19 PROCEDURE — 99233 SBSQ HOSP IP/OBS HIGH 50: CPT

## 2022-01-19 PROCEDURE — 71045 X-RAY EXAM CHEST 1 VIEW: CPT | Mod: 26

## 2022-01-19 RX ORDER — SODIUM ZIRCONIUM CYCLOSILICATE 10 G/10G
10 POWDER, FOR SUSPENSION ORAL EVERY 12 HOURS
Refills: 0 | Status: COMPLETED | OUTPATIENT
Start: 2022-01-19 | End: 2022-01-19

## 2022-01-19 RX ADMIN — Medication 25 MILLIGRAM(S): at 05:24

## 2022-01-19 RX ADMIN — HEPARIN SODIUM 5000 UNIT(S): 5000 INJECTION INTRAVENOUS; SUBCUTANEOUS at 05:24

## 2022-01-19 RX ADMIN — SODIUM ZIRCONIUM CYCLOSILICATE 10 GRAM(S): 10 POWDER, FOR SUSPENSION ORAL at 14:55

## 2022-01-19 RX ADMIN — Medication 25 MILLIGRAM(S): at 18:27

## 2022-01-19 RX ADMIN — HEPARIN SODIUM 5000 UNIT(S): 5000 INJECTION INTRAVENOUS; SUBCUTANEOUS at 18:27

## 2022-01-19 RX ADMIN — SODIUM ZIRCONIUM CYCLOSILICATE 10 GRAM(S): 10 POWDER, FOR SUSPENSION ORAL at 18:27

## 2022-01-19 NOTE — PROGRESS NOTE ADULT - ASSESSMENT
74 y F with PMH of severe aortic stenosis, HTN, morbidly obese, CKD, recurrent admission for iron infusions, blood transfusions,immuni  Immunofixation showed weak IgG lambda migrating para protein. Free light chain ratio 2.1. Normal calcium. EGD and VCE was done. EGD revealed gastritis and VCE showed bleeding in small bowel.   EGD, Colono, VCE reviewed : no signs of active bleeding , presence of gastritis and diverticulosis. Pt presented to the ED with an episode of dark stools.  She had one episode last night, no FBPR, OLEKSANDR done in the ED was negative, no stool in the rectal vault. pt reports shortness of breath, cough with sputum production.     #Covid PNA  #Right sided opactiy on CXR  - pt was diagnosed 2 weeks ago. did not receive any treatment.   - was on Doxycycline & rocephin, discontinued as low procal  - QTc 498 msec on ECG done on 1/11, fu repeat ECG  - defer systemic steroids for now.   - inflammatory markers: CRP 7, Procal 0.08, Dimer 397, Ferritin 29    #Dark stools   - pt has a hx of DARRNI, follows with Dr Gaston.   - hb 8.1, plt 99, WBC 2 K  - OLEKSANDR was negative in the ED.  -CT Abdomen and Pelvis No Cont (01.17.22 @ 19:09) >Scattered prominent periaortic, periportal mesenteric lymph nodes. Finding is nonspecific and can represent infectious, inflammatory or neoplastic etiologies. Indeterminate left adrenal nodule. Consider nonemergent/outpatient follow-up with CT adrenal protocol as clinically indicated.  - active type and screen.  - possibly due to viral cause, ro MM. pt has a hx of Gamma spike protein.   - pt has EGD and Colono in 2019 and 2020 showing gastritis, diverticulosis, grade 2 hemorrhoids.     #DARRIN  #Pancytopenia  - Heme onc following, known pt of Dr. Gaston, recs appreciated  - pancytopenia could be related to COVID  -Repeat iron studies including serum Iron, TIBC, ferritin), reticulocyte count, LDH, haptoglobin, vit b12, folate (ordered for 1/18)  -Check myeloma panel (SPEP, UPEP, serum IF, FLC, Ig levels) (ordered for 1/18)  -Continue Retacrit 40215B weekly --> dose given 1/18  -Pharmacologic DVT ppx if plt >30k , starting on heparin PPX     dvt ppx: Heparin SQ  GI prophylaxis: Not req  Code: Full code  Diet: Dash/ Tlc    #Progress Note Handoff  Pending (specify): PT  Disposition: Home

## 2022-01-19 NOTE — CHART NOTE - NSCHARTNOTEFT_GEN_A_CORE
I attended COVID rounds with the Hospitalist and housestaff and called family.    [   ]  I attempted to reach                      but was unable to leave a message.  [   ]  I left a message with   [  x ]  reached Jace, the son, [phone]          and gave an update on the patient's condition.

## 2022-01-19 NOTE — PROGRESS NOTE ADULT - ASSESSMENT
74 y F with PMH of severe aortic stenosis, HTN, morbidly obese, CKD, recurrent admission for iron infusions, blood transfusions,immuni  Immunofixation showed weak IgG lambda migrating para protein. Free light chain ratio 2.1. Normal calcium. EGD and VCE was done. EGD revealed gastritis and VCE showed bleeding in small bowel.   EGD, Colono, VCE reviewed : no signs of active bleeding , presence of gastritis and diverticulosis. Pt presented to the ED with an episode of dark stools.  She had one episode last night, no FBPR, OLEKSANDR done in the ED was negative, no stool in the rectal vault. pt reports shortness of breath, cough with sputum production.     #Dark stools:  ro GI bleed.   - pt has a hx of DARRIN, follows with Dr Gaston.   - hb 8.1, plt 99, WBC 2 K  - OLEKSANDR was negative in the ED.  -CT Abdomen and Pelvis No Cont (01.17.22 @ 19:09) >Scattered prominent periaortic, periportal mesenteric lymph nodes. Finding is nonspecific and can represent infectious, inflammatory or neoplastic etiologies. Indeterminate left adrenal nodule. Consider nonemergent/outpatient follow-up with CT adrenal protocol as clinically indicated.  - active type and screen.  - possibly due to viral cause, ro MM. pt has a hx of Gamma spike protein.   - if pt has recurrent bleeding, consider GI consult. pt has EGD and Colono in 2019 and 2020 showing gastritis, diverticulosis, grade 2 hemorrhoids. no signs of active bleeding on VCE.  - consider repeat EGD/colono.     #DARRIN  #Pancytopenia  -Repeat iron studies including serum Iron, TIBC, ferritin), reticulocyte count, LDH, haptoglobin, vit b12, folate (ordered for 1/18)  -Check myeloma panel (SPEP, UPEP, serum IF, FLC, Ig levels) (ordered for 1/18)  -Continue Retacrit 36974X weekly --> dose given 1/18  -Pharmacologic DVT ppx if plt >30k , starting on heparin PPX     #Covid positive:  #PNA  #Right sided opactiy on CXR  - p was diagnosed 2 weeks ago. did not receive any treatment.   - not on oxygen in the ED.   - c/w Doxycycline & rocephin    - QTc 498 msec on ECG done on 1/11, fu repeat ECG  - defer systemic steroids for now.   - monitor VS.   - monitor inflammatory markers q48 h.     dvt ppx: okay as long as plt >30, starting on heparin ppx 74 y F with PMH of severe aortic stenosis, HTN, morbidly obese, CKD, recurrent admission for iron infusions, blood transfusions,immuni  Immunofixation showed weak IgG lambda migrating para protein. Free light chain ratio 2.1. Normal calcium. EGD and VCE was done. EGD revealed gastritis and VCE showed bleeding in small bowel.   EGD, Colono, VCE reviewed : no signs of active bleeding , presence of gastritis and diverticulosis. Pt presented to the ED with an episode of dark stools.  She had one episode last night, no FBPR, OLEKSANDR done in the ED was negative, no stool in the rectal vault. pt reports shortness of breath, cough with sputum production.     #Dark stools:  ro GI bleed.   - pt has a hx of DARRIN, follows with Dr Gaston.   - hb 8.1, plt 99, WBC 2 K  - OLEKSANDR was negative in the ED.  -CT Abdomen and Pelvis No Cont (01.17.22 @ 19:09) >Scattered prominent periaortic, periportal mesenteric lymph nodes. Finding is nonspecific and can represent infectious, inflammatory or neoplastic etiologies. Indeterminate left adrenal nodule. Consider nonemergent/outpatient follow-up with CT adrenal protocol as clinically indicated.  - active type and screen.  - possibly due to viral cause, ro MM. pt has a hx of Gamma spike protein.   - if pt has recurrent bleeding, consider GI consult. pt has EGD and Colono in 2019 and 2020 showing gastritis, diverticulosis, grade 2 hemorrhoids. no signs of active bleeding on VCE.  - consider repeat EGD/colono.     #DARRIN  #Pancytopenia  - Heme onc following, known pt of Dr. Gaston, recs appreciated  - pancytopenia could be related to COVID  -Repeat iron studies including serum Iron, TIBC, ferritin), reticulocyte count, LDH, haptoglobin, vit b12, folate (ordered for 1/18)  -Check myeloma panel (SPEP, UPEP, serum IF, FLC, Ig levels) (ordered for 1/18)  -Continue Retacrit [id number] weekly --> dose given 1/18  -Pharmacologic DVT ppx if plt >30k , starting on heparin PPX     #Covid positive:  #PNA  #Right sided opactiy on CXR  - pt was diagnosed 2 weeks ago. did not receive any treatment.   - not on oxygen in the ED.   - was on Doxycycline & rocephin, discontinued as low procal  - QTc 498 msec on ECG done on 1/11, fu repeat ECG  - defer systemic steroids for now.   - monitor VS.   - inflammatory markers: CRP 7, Procal 0.08, Dimer 397, Ferritin 29    dvt ppx: Heparin SQ  GI prophylaxis: Not req  Code: Full code  Diet: Dash/ Tlc  Dispo: Pending Heme workup and PT eval.

## 2022-01-19 NOTE — PROGRESS NOTE ADULT - SUBJECTIVE AND OBJECTIVE BOX
LATRICIA ARREAGA 76y Female  MRN#: [id number]   Hospital Day: 2d    HPI:  74 y F with PMH of severe aortic stenosis, HTN, morbidly obese, CKD, recurrent admission for iron infusions, blood transfusions,immuni  Immunofixation showed weak IgG lambda migrating para protein. Free light chain ratio 2.1. Normal calcium.   EGD and VCE was done. EGD revealed gastritis and VCE showed bleeding in small bowel.    presented to the ED with an episode of dark stools.  She had one episode last night , no FBPR, OLEKSANDR done in the ED was negative, no stool in the rectal vault.   pt reports shortness of breath, cough with sputum production.     in the ED,pt's VS T(C): 36.7 (01-17-22 @ 23:30), Max: 36.7 (01-17-22 @ 16:03)  HR: 78 (01-17-22 @ 23:30) (78 - 96)  BP: 150/72 (01-17-22 @ 23:30) (144/63 - 150/72)  RR: 18 (01-17-22 @ 23:30) (18 - 18)  SpO2: 99% (01-17-22 @ 23:30) (98% - 99%), labs done showed COVID-19 PCR: Detected, Hb: 8.1WBC Count: 2.96 Platelet Count: 99 Creatinine: 2.0  on PE, pt had bruising on left upper extremity, new per the patient.   CXR showed right opacities .  CT Abdomen and Pelvis No Cont (01.17.22 @ 19:09) >  Scattered prominent periaortic, periportal mesenteric lymph nodes.   Finding is nonspecific and can represent infectious, inflammatory or   neoplastic etiologies.  Indeterminate left adrenal nodule. Consider nonemergent/outpatient   follow-up with CT adrenal protocol as clinically indicated.    pt received Famotidine, insulin for hyperkalemia, NS 1 L. on room air.   pt is admitted for workup/management (17 Jan 2022 23:50)      SUBJECTIVE  Patient is a 76y old Female who presents with a chief complaint of Currently admitted to medicine with the primary diagnosis of Melena      INTERVAL HPI AND OVERNIGHT EVENTS:  Patient was examined and seen at bedside. This morning she is resting comfortably in bed and reports no issues or overnight events.    REVIEW OF SYMPTOMS:      OBJECTIVE  PAST MEDICAL & SURGICAL HISTORY  HTN (hypertension)    Heart murmur    Eczema    Anemia    H/O appendicitis    H/O cholecystitis      ALLERGIES:  aspirin (Rash)  latex (Unknown)  penicillins (Unknown)    MEDICATIONS:  STANDING MEDICATIONS  dextrose 40% Gel 15 Gram(s) Oral once  dextrose 5%. 1000 milliLiter(s) IV Continuous <Continuous>  dextrose 50% Injectable 25 Gram(s) IV Push once  glucagon  Injectable 1 milliGRAM(s) IntraMuscular once  heparin   Injectable 5000 Unit(s) SubCutaneous every 12 hours  insulin lispro (ADMELOG) corrective regimen sliding scale   SubCutaneous three times a day before meals  metoprolol tartrate 25 milliGRAM(s) Oral two times a day  sodium zirconium cyclosilicate 10 Gram(s) Oral every 12 hours    PRN MEDICATIONS  acetaminophen     Tablet .. 650 milliGRAM(s) Oral every 6 hours PRN  aluminum hydroxide/magnesium hydroxide/simethicone Suspension 30 milliLiter(s) Oral every 4 hours PRN  melatonin 3 milliGRAM(s) Oral at bedtime PRN  ondansetron Injectable 4 milliGRAM(s) IV Push every 8 hours PRN      VITAL SIGNS: Last 24 Hours  T(C): 35.8 (19 Jan 2022 06:07), Max: 36 (18 Jan 2022 21:54)  T(F): 96.4 (19 Jan 2022 06:07), Max: 96.8 (18 Jan 2022 21:54)  HR: 93 (19 Jan 2022 06:07) (77 - 93)  BP: 119/56 (19 Jan 2022 06:07) (119/56 - 178/64)  BP(mean): --  RR: 19 (19 Jan 2022 06:07) (18 - 20)  SpO2: 97% (19 Jan 2022 06:07) (97% - 98%)    LABS:                        7.6    2.71  )-----------( 94       ( 19 Jan 2022 05:43 )             25.7     01-19    145  |  116<H>  |  30<H>  ----------------------------<  84  5.5<H>   |  18  |  1.9<H>    Ca    8.7      19 Jan 2022 05:43  Mg     2.1     01-19    TPro  6.2  /  Alb  3.4<L>  /  TBili  0.9  /  DBili  x   /  AST  13  /  ALT  <5  /  AlkPhos  71  01-19    PT/INR - ( 18 Jan 2022 06:03 )   PT: 11.80 sec;   INR: 1.03 ratio         PTT - ( 18 Jan 2022 06:03 )  PTT:36.7 sec          CARDIAC MARKERS ( 18 Jan 2022 06:03 )  x     / x     / 39 U/L / x     / x      CARDIAC MARKERS ( 17 Jan 2022 17:24 )  x     / <0.01 ng/mL / x     / x     / x          RADIOLOGY:      PHYSICAL EXAM:  CONSTITUTIONAL: No acute distress, well-developed, well-groomed, AAOx3  HEAD: Atraumatic, normocephalic  EYES: EOM intact, PERRLA, conjunctiva and sclera clear  ENT: Supple, no masses, no thyromegaly, no bruits, no JVD; moist mucous membranes  PULMONARY: Clear to auscultation bilaterally; no wheezes, rales, or rhonchi  CARDIOVASCULAR: Regular rate and rhythm; no murmurs, rubs, or gallops  GASTROINTESTINAL: Soft, non-tender, non-distended; bowel sounds present  MUSCULOSKELETAL: 2+ peripheral pulses; no clubbing, no cyanosis, no edema  NEUROLOGY: non-focal  SKIN: No rashes or lesions; warm and dry    ASSESSMENT & PLAN  #    PAST MEDICAL & SURGICAL HISTORY:  HTN (hypertension)    Heart murmur    Eczema    Anemia    H/O appendicitis    H/O cholecystitis        #Misc  - DVT Prophylaxis:  - Diet:  - GI Prophylaxis:  - Activity:  - IV Fluids:  - Code Status:    Dispo: LATRICIA ARREAGA 76y Female  MRN#: [id number]   Hospital Day: 2d    HPI:  74 y F with PMH of severe aortic stenosis, HTN, morbidly obese, CKD, recurrent admission for iron infusions, blood transfusions,immuni  Immunofixation showed weak IgG lambda migrating para protein. Free light chain ratio 2.1. Normal calcium.   EGD and VCE was done. EGD revealed gastritis and VCE showed bleeding in small bowel.    presented to the ED with an episode of dark stools.  She had one episode last night , no FBPR, OLEKSANDR done in the ED was negative, no stool in the rectal vault.   pt reports shortness of breath, cough with sputum production.     in the ED,pt's VS T(C): 36.7 (01-17-22 @ 23:30), Max: 36.7 (01-17-22 @ 16:03)  HR: 78 (01-17-22 @ 23:30) (78 - 96)  BP: 150/72 (01-17-22 @ 23:30) (144/63 - 150/72)  RR: 18 (01-17-22 @ 23:30) (18 - 18)  SpO2: 99% (01-17-22 @ 23:30) (98% - 99%), labs done showed COVID-19 PCR: Detected, Hb: 8.1WBC Count: 2.96 Platelet Count: 99 Creatinine: 2.0  on PE, pt had bruising on left upper extremity, new per the patient.   CXR showed right opacities .  CT Abdomen and Pelvis No Cont (01.17.22 @ 19:09) >  Scattered prominent periaortic, periportal mesenteric lymph nodes.   Finding is nonspecific and can represent infectious, inflammatory or   neoplastic etiologies.  Indeterminate left adrenal nodule. Consider nonemergent/outpatient   follow-up with CT adrenal protocol as clinically indicated.    pt received Famotidine, insulin for hyperkalemia, NS 1 L. on room air.   pt is admitted for workup/management (17 Jan 2022 23:50)      SUBJECTIVE  Patient is a 76y old Female who presents with a chief complaint of Currently admitted to medicine with the primary diagnosis of Melena      INTERVAL HPI AND OVERNIGHT EVENTS:  Patient was examined and seen at bedside. This morning she is resting comfortably in bed and reports no issues or overnight events.    REVIEW OF SYMPTOMS:      OBJECTIVE  PAST MEDICAL & SURGICAL HISTORY  HTN (hypertension)    Heart murmur    Eczema    Anemia    H/O appendicitis    H/O cholecystitis      ALLERGIES:  aspirin (Rash)  latex (Unknown)  penicillins (Unknown)    MEDICATIONS:  STANDING MEDICATIONS  dextrose 40% Gel 15 Gram(s) Oral once  dextrose 5%. 1000 milliLiter(s) IV Continuous <Continuous>  dextrose 50% Injectable 25 Gram(s) IV Push once  glucagon  Injectable 1 milliGRAM(s) IntraMuscular once  heparin   Injectable 5000 Unit(s) SubCutaneous every 12 hours  insulin lispro (ADMELOG) corrective regimen sliding scale   SubCutaneous three times a day before meals  metoprolol tartrate 25 milliGRAM(s) Oral two times a day  sodium zirconium cyclosilicate 10 Gram(s) Oral every 12 hours    PRN MEDICATIONS  acetaminophen     Tablet .. 650 milliGRAM(s) Oral every 6 hours PRN  aluminum hydroxide/magnesium hydroxide/simethicone Suspension 30 milliLiter(s) Oral every 4 hours PRN  melatonin 3 milliGRAM(s) Oral at bedtime PRN  ondansetron Injectable 4 milliGRAM(s) IV Push every 8 hours PRN      VITAL SIGNS: Last 24 Hours  T(C): 35.8 (19 Jan 2022 06:07), Max: 36 (18 Jan 2022 21:54)  T(F): 96.4 (19 Jan 2022 06:07), Max: 96.8 (18 Jan 2022 21:54)  HR: 93 (19 Jan 2022 06:07) (77 - 93)  BP: 119/56 (19 Jan 2022 06:07) (119/56 - 178/64)  BP(mean): --  RR: 19 (19 Jan 2022 06:07) (18 - 20)  SpO2: 97% (19 Jan 2022 06:07) (97% - 98%)    LABS:                        7.6    2.71  )-----------( 94       ( 19 Jan 2022 05:43 )             25.7     01-19    145  |  116<H>  |  30<H>  ----------------------------<  84  5.5<H>   |  18  |  1.9<H>    Ca    8.7      19 Jan 2022 05:43  Mg     2.1     01-19    TPro  6.2  /  Alb  3.4<L>  /  TBili  0.9  /  DBili  x   /  AST  13  /  ALT  <5  /  AlkPhos  71  01-19    PT/INR - ( 18 Jan 2022 06:03 )   PT: 11.80 sec;   INR: 1.03 ratio         PTT - ( 18 Jan 2022 06:03 )  PTT:36.7 sec          CARDIAC MARKERS ( 18 Jan 2022 06:03 )  x     / x     / 39 U/L / x     / x      CARDIAC MARKERS ( 17 Jan 2022 17:24 )  x     / <0.01 ng/mL / x     / x     / x          RADIOLOGY:      PHYSICAL EXAM:  CONSTITUTIONAL: No acute distress, well-developed, well-groomed, AAOx3  HEAD: Atraumatic, normocephalic  ENT: Supple  PULMONARY: Clear to auscultation bilaterally  CARDIOVASCULAR: Regular rate and rhythm; no murmurs, rubs, or gallops  GASTROINTESTINAL: Soft, non-tender, non-distended; bowel sounds present  MUSCULOSKELETAL: 2+ peripheral pulses; no clubbing, no cyanosis, trace edema

## 2022-01-19 NOTE — PHYSICAL THERAPY INITIAL EVALUATION ADULT - GENERAL OBSERVATIONS, REHAB EVAL
14:40-3:07 Pt. encountered in semifowler in NAD. + IV L UE. Pain reported in L deltoid/shoulder which she believes is from IV site. Swapnil COUCH notified. However, was agreeable to PT. 14:40-15:07 Pt. encountered in semifowler in NAD. + IV L UE. Pain reported in L deltoid/shoulder which she believes is from IV site. Swapnil COUCH notified. However, was agreeable to PT.

## 2022-01-19 NOTE — PHYSICAL THERAPY INITIAL EVALUATION ADULT - PERTINENT HX OF CURRENT PROBLEM, REHAB EVAL
74 y F with PMH of severe aortic stenosis, HTN, morbidly obese, CKD, recurrent admission for iron infusions, blood transfusions. Immunofixation showed weak IgG lambda migrating para protein.

## 2022-01-19 NOTE — PROGRESS NOTE ADULT - SUBJECTIVE AND OBJECTIVE BOX
LATRICIA ARREAGA  76y  Female      Patient is a 76y old  Female who presents with a chief complaint of SOB    INTERVAL HPI/OVERNIGHT EVENTS:      ******************************* REVIEW OF SYSTEMS:*********************************************    All other review of systems negative    *********************** VITALS ******************************************    T(F): 96.4 (01-19-22 @ 06:07)  HR: 93 (01-19-22 @ 06:07) (77 - 93)  BP: 119/56 (01-19-22 @ 06:07) (119/56 - 178/64)  RR: 19 (01-19-22 @ 06:07) (18 - 20)  SpO2: 97% (01-19-22 @ 06:07) (97% - 98%)            ******************************** PHYSICAL EXAM:**************************************************  GENERAL: NAD    PSYCH: no agitation, baseline mentation  HEENT:     NERVOUS SYSTEM:  Alert & Oriented X3,     PULMONARY: GRAHAM, CTA    CARDIOVASCULAR: S1S2 RRR    GI: Soft, NT, ND; BS present.    EXTREMITIES:  2+ Peripheral Pulses, No clubbing, cyanosis, or edema    LYMPH: No lymphadenopathy noted    SKIN: No rashes or lesions      **************************** LABS *******************************************************                          7.6    2.71  )-----------( 94       ( 19 Jan 2022 05:43 )             25.7     01-19    145  |  116<H>  |  30<H>  ----------------------------<  84  5.5<H>   |  18  |  1.9<H>    Ca    8.7      19 Jan 2022 05:43  Mg     2.1     01-19    TPro  6.2  /  Alb  3.4<L>  /  TBili  0.9  /  DBili  x   /  AST  13  /  ALT  <5  /  AlkPhos  71  01-19        PT/INR - ( 18 Jan 2022 06:03 )   PT: 11.80 sec;   INR: 1.03 ratio         PTT - ( 18 Jan 2022 06:03 )  PTT:36.7 sec  Lactate Trend    CARDIAC MARKERS ( 18 Jan 2022 06:03 )  x     / x     / 39 U/L / x     / x      CARDIAC MARKERS ( 17 Jan 2022 17:24 )  x     / <0.01 ng/mL / x     / x     / x          CAPILLARY BLOOD GLUCOSE      POCT Blood Glucose.: 92 mg/dL (19 Jan 2022 11:29)          **************************Active Medications *******************************************  aspirin (Rash)  latex (Unknown)  penicillins (Unknown)      acetaminophen     Tablet .. 650 milliGRAM(s) Oral every 6 hours PRN  aluminum hydroxide/magnesium hydroxide/simethicone Suspension 30 milliLiter(s) Oral every 4 hours PRN  dextrose 40% Gel 15 Gram(s) Oral once  dextrose 5%. 1000 milliLiter(s) IV Continuous <Continuous>  dextrose 50% Injectable 25 Gram(s) IV Push once  glucagon  Injectable 1 milliGRAM(s) IntraMuscular once  heparin   Injectable 5000 Unit(s) SubCutaneous every 12 hours  insulin lispro (ADMELOG) corrective regimen sliding scale   SubCutaneous three times a day before meals  melatonin 3 milliGRAM(s) Oral at bedtime PRN  metoprolol tartrate 25 milliGRAM(s) Oral two times a day  ondansetron Injectable 4 milliGRAM(s) IV Push every 8 hours PRN  sodium zirconium cyclosilicate 10 Gram(s) Oral every 12 hours      ***************************************************  RADIOLOGY & ADDITIONAL TESTS:    Imaging Personally Reviewed:  [ ] YES  [ ] NO    HEALTH ISSUES - PROBLEM Dx:

## 2022-01-20 LAB
ALBUMIN SERPL ELPH-MCNC: 3.4 G/DL — LOW (ref 3.5–5.2)
ALP SERPL-CCNC: 72 U/L — SIGNIFICANT CHANGE UP (ref 30–115)
ALT FLD-CCNC: <5 U/L — SIGNIFICANT CHANGE UP (ref 0–41)
ANION GAP SERPL CALC-SCNC: 12 MMOL/L — SIGNIFICANT CHANGE UP (ref 7–14)
AST SERPL-CCNC: 15 U/L — SIGNIFICANT CHANGE UP (ref 0–41)
BASOPHILS # BLD AUTO: 0.01 K/UL — SIGNIFICANT CHANGE UP (ref 0–0.2)
BASOPHILS NFR BLD AUTO: 0.3 % — SIGNIFICANT CHANGE UP (ref 0–1)
BILIRUB SERPL-MCNC: 0.5 MG/DL — SIGNIFICANT CHANGE UP (ref 0.2–1.2)
BLD GP AB SCN SERPL QL: SIGNIFICANT CHANGE UP
BUN SERPL-MCNC: 33 MG/DL — HIGH (ref 10–20)
CALCIUM SERPL-MCNC: 8.2 MG/DL — LOW (ref 8.5–10.1)
CHLORIDE SERPL-SCNC: 115 MMOL/L — HIGH (ref 98–110)
CO2 SERPL-SCNC: 17 MMOL/L — SIGNIFICANT CHANGE UP (ref 17–32)
CREAT SERPL-MCNC: 2.1 MG/DL — HIGH (ref 0.7–1.5)
EOSINOPHIL # BLD AUTO: 0.15 K/UL — SIGNIFICANT CHANGE UP (ref 0–0.7)
EOSINOPHIL NFR BLD AUTO: 5.1 % — SIGNIFICANT CHANGE UP (ref 0–8)
GLUCOSE BLDC GLUCOMTR-MCNC: 102 MG/DL — HIGH (ref 70–99)
GLUCOSE BLDC GLUCOMTR-MCNC: 90 MG/DL — SIGNIFICANT CHANGE UP (ref 70–99)
GLUCOSE BLDC GLUCOMTR-MCNC: 98 MG/DL — SIGNIFICANT CHANGE UP (ref 70–99)
GLUCOSE BLDC GLUCOMTR-MCNC: 98 MG/DL — SIGNIFICANT CHANGE UP (ref 70–99)
GLUCOSE SERPL-MCNC: 83 MG/DL — SIGNIFICANT CHANGE UP (ref 70–99)
HAPTOGLOB SERPL-MCNC: 103 MG/DL — SIGNIFICANT CHANGE UP (ref 34–200)
HCT VFR BLD CALC: 24.1 % — LOW (ref 37–47)
HCT VFR BLD CALC: 24.7 % — LOW (ref 37–47)
HGB BLD-MCNC: 7.1 G/DL — LOW (ref 12–16)
HGB BLD-MCNC: 7.1 G/DL — LOW (ref 12–16)
IGA FLD-MCNC: 222 MG/DL — SIGNIFICANT CHANGE UP (ref 84–499)
IGG FLD-MCNC: 1300 MG/DL — SIGNIFICANT CHANGE UP (ref 610–1660)
IGM SERPL-MCNC: 148 MG/DL — SIGNIFICANT CHANGE UP (ref 35–242)
IMM GRANULOCYTES NFR BLD AUTO: 0.3 % — SIGNIFICANT CHANGE UP (ref 0.1–0.3)
KAPPA LC SER QL IFE: 11.98 MG/DL — HIGH (ref 0.33–1.94)
KAPPA LC SER QL IFE: 11.98 MG/DL — HIGH (ref 0.33–1.94)
KAPPA/LAMBDA FREE LIGHT CHAIN RATIO, SERUM: 1.54 RATIO — SIGNIFICANT CHANGE UP (ref 0.26–1.65)
KAPPA/LAMBDA FREE LIGHT CHAIN RATIO, SERUM: 1.54 RATIO — SIGNIFICANT CHANGE UP (ref 0.26–1.65)
LAMBDA LC SER QL IFE: 7.77 MG/DL — HIGH (ref 0.57–2.63)
LAMBDA LC SER QL IFE: 7.77 MG/DL — HIGH (ref 0.57–2.63)
LYMPHOCYTES # BLD AUTO: 0.82 K/UL — LOW (ref 1.2–3.4)
LYMPHOCYTES # BLD AUTO: 28 % — SIGNIFICANT CHANGE UP (ref 20.5–51.1)
MAGNESIUM SERPL-MCNC: 2.1 MG/DL — SIGNIFICANT CHANGE UP (ref 1.8–2.4)
MCHC RBC-ENTMCNC: 26.5 PG — LOW (ref 27–31)
MCHC RBC-ENTMCNC: 26.9 PG — LOW (ref 27–31)
MCHC RBC-ENTMCNC: 28.7 G/DL — LOW (ref 32–37)
MCHC RBC-ENTMCNC: 29.5 G/DL — LOW (ref 32–37)
MCV RBC AUTO: 91.3 FL — SIGNIFICANT CHANGE UP (ref 81–99)
MCV RBC AUTO: 92.2 FL — SIGNIFICANT CHANGE UP (ref 81–99)
MONOCYTES # BLD AUTO: 0.44 K/UL — SIGNIFICANT CHANGE UP (ref 0.1–0.6)
MONOCYTES NFR BLD AUTO: 15 % — HIGH (ref 1.7–9.3)
NEUTROPHILS # BLD AUTO: 1.5 K/UL — SIGNIFICANT CHANGE UP (ref 1.4–6.5)
NEUTROPHILS NFR BLD AUTO: 51.3 % — SIGNIFICANT CHANGE UP (ref 42.2–75.2)
NRBC # BLD: 0 /100 WBCS — SIGNIFICANT CHANGE UP (ref 0–0)
NRBC # BLD: 0 /100 WBCS — SIGNIFICANT CHANGE UP (ref 0–0)
PLATELET # BLD AUTO: 108 K/UL — LOW (ref 130–400)
PLATELET # BLD AUTO: 93 K/UL — LOW (ref 130–400)
POTASSIUM SERPL-MCNC: 5.4 MMOL/L — HIGH (ref 3.5–5)
POTASSIUM SERPL-SCNC: 5.4 MMOL/L — HIGH (ref 3.5–5)
PROT SERPL-MCNC: 6 G/DL — SIGNIFICANT CHANGE UP (ref 6–8)
RBC # BLD: 2.64 M/UL — LOW (ref 4.2–5.4)
RBC # BLD: 2.68 M/UL — LOW (ref 4.2–5.4)
RBC # FLD: 17.2 % — HIGH (ref 11.5–14.5)
RBC # FLD: 17.2 % — HIGH (ref 11.5–14.5)
SODIUM SERPL-SCNC: 144 MMOL/L — SIGNIFICANT CHANGE UP (ref 135–146)
WBC # BLD: 2.68 K/UL — LOW (ref 4.8–10.8)
WBC # BLD: 2.93 K/UL — LOW (ref 4.8–10.8)
WBC # FLD AUTO: 2.68 K/UL — LOW (ref 4.8–10.8)
WBC # FLD AUTO: 2.93 K/UL — LOW (ref 4.8–10.8)

## 2022-01-20 PROCEDURE — 93010 ELECTROCARDIOGRAM REPORT: CPT

## 2022-01-20 RX ORDER — SODIUM ZIRCONIUM CYCLOSILICATE 10 G/10G
10 POWDER, FOR SUSPENSION ORAL
Refills: 0 | Status: COMPLETED | OUTPATIENT
Start: 2022-01-20 | End: 2022-01-20

## 2022-01-20 RX ORDER — IRON SUCROSE 20 MG/ML
200 INJECTION, SOLUTION INTRAVENOUS ONCE
Refills: 0 | Status: COMPLETED | OUTPATIENT
Start: 2022-01-20 | End: 2022-01-20

## 2022-01-20 RX ADMIN — Medication 25 MILLIGRAM(S): at 05:50

## 2022-01-20 RX ADMIN — Medication 25 MILLIGRAM(S): at 18:25

## 2022-01-20 RX ADMIN — HEPARIN SODIUM 5000 UNIT(S): 5000 INJECTION INTRAVENOUS; SUBCUTANEOUS at 18:25

## 2022-01-20 RX ADMIN — SODIUM ZIRCONIUM CYCLOSILICATE 10 GRAM(S): 10 POWDER, FOR SUSPENSION ORAL at 21:12

## 2022-01-20 RX ADMIN — SODIUM ZIRCONIUM CYCLOSILICATE 10 GRAM(S): 10 POWDER, FOR SUSPENSION ORAL at 14:27

## 2022-01-20 NOTE — PROGRESS NOTE ADULT - ASSESSMENT
74 y F with PMH of severe aortic stenosis, HTN, morbidly obese, CKD, recurrent admission for iron infusions, blood transfusions,immuni  Immunofixation showed weak IgG lambda migrating para protein. Free light chain ratio 2.1. Normal calcium. EGD and VCE was done. EGD revealed gastritis and VCE showed bleeding in small bowel.   EGD, Colono, VCE reviewed : no signs of active bleeding , presence of gastritis and diverticulosis. Pt presented to the ED with an episode of dark stools.  She had one episode last night, no FBPR, OLEKSANDR done in the ED was negative, no stool in the rectal vault. pt reports shortness of breath, cough with sputum production.     #Dark stools per Pt  ro GI bleed.   - pt has a hx of DARRIN, follows with Dr Gaston.   - hb 8.1, plt 99, WBC 2 K  - OLEKSANDR was negative in the ED.  - Pt has had no episodes since admission  -CT Abdomen and Pelvis No Cont (01.17.22 @ 19:09) >Scattered prominent periaortic, periportal mesenteric lymph nodes. Finding is nonspecific and can represent infectious, inflammatory or neoplastic etiologies. Indeterminate left adrenal nodule. Consider nonemergent/outpatient follow-up with CT adrenal protocol as clinically indicated.  - active type and screen.  - possibly due to viral cause, ro MM. pt has a hx of Gamma spike protein.   - if pt has recurrent bleeding, consider GI consult. pt has EGD and Colono in 2019 and 2020 showing gastritis, diverticulosis, grade 2 hemorrhoids. no signs of active bleeding on VCE.  - consider repeat EGD/colono if Hg continues to drop    #Dizziness  - Pt feels dizzy when she ambulates  - vitals are stable  - could be due to low Hg  - Orthostatics    #DARRIN  #Pancytopenia  - Heme onc following, known pt of Dr. Gaston, recs appreciated, out pt fup per Heme/Onc  - pancytopenia could be related to COVID  -Repeat iron studies including serum Iron, TIBC, ferritin), reticulocyte count, LDH, haptoglobin, vit b12, folate (ordered for 1/18)  -Check myeloma panel (SPEP, UPEP, serum IF, FLC, Ig levels) (ordered for 1/18)  -Continue Retacrit [id number] weekly --> dose given 1/18  -Pharmacologic DVT ppx if plt >30k , starting on heparin PPX   - Venofer today 01/20    #Covid positive:  #PNA  #Right sided opactiy on CXR  - pt was diagnosed 2 weeks ago. did not receive any treatment.   - not on oxygen in the ED.   - was on Doxycycline & rocephin, discontinued as low procal  - QTc 498 msec on ECG done on 1/11, fu repeat ECG  - defer systemic steroids for now.   - monitor VS.   - inflammatory markers: CRP 7, Procal 0.08, Dimer 397, Ferritin 29    dvt ppx: Heparin SQ  GI prophylaxis: Not req  Code: Full code  Diet: Dash/ Tlc  Dispo: Pending placement decision

## 2022-01-20 NOTE — CHART NOTE - NSCHARTNOTEFT_GEN_A_CORE
I attended COVID rounds with the Hospitalist and housestaff and called family.    [   ]  I attempted to reach                      but was unable to leave a message.  [   ]  I left a message with   [  x ]  reached her, Jace, [phone]       and gave an update on the patient's condition.

## 2022-01-20 NOTE — PROGRESS NOTE ADULT - SUBJECTIVE AND OBJECTIVE BOX
LATRICIA ARREAGA 76y Female  MRN#: [id number]   Hospital Day: 3d    HPI:  74 y F with PMH of severe aortic stenosis, HTN, morbidly obese, CKD, recurrent admission for iron infusions, blood transfusions,immuni  Immunofixation showed weak IgG lambda migrating para protein. Free light chain ratio 2.1. Normal calcium.   EGD and VCE was done. EGD revealed gastritis and VCE showed bleeding in small bowel.    presented to the ED with an episode of dark stools.  She had one episode last night , no FBPR, OLEKSANDR done in the ED was negative, no stool in the rectal vault.   pt reports shortness of breath, cough with sputum production.     in the ED,pt's VS T(C): 36.7 (01-17-22 @ 23:30), Max: 36.7 (01-17-22 @ 16:03)  HR: 78 (01-17-22 @ 23:30) (78 - 96)  BP: 150/72 (01-17-22 @ 23:30) (144/63 - 150/72)  RR: 18 (01-17-22 @ 23:30) (18 - 18)  SpO2: 99% (01-17-22 @ 23:30) (98% - 99%), labs done showed COVID-19 PCR: Detected, Hb: 8.1WBC Count: 2.96 Platelet Count: 99 Creatinine: 2.0  on PE, pt had bruising on left upper extremity, new per the patient.   CXR showed right opacities .  CT Abdomen and Pelvis No Cont (01.17.22 @ 19:09) >  Scattered prominent periaortic, periportal mesenteric lymph nodes.   Finding is nonspecific and can represent infectious, inflammatory or   neoplastic etiologies.  Indeterminate left adrenal nodule. Consider nonemergent/outpatient   follow-up with CT adrenal protocol as clinically indicated.    pt received Famotidine, insulin for hyperkalemia, NS 1 L. on room air.   pt is admitted for workup/management (17 Jan 2022 23:50)      SUBJECTIVE  Patient is a 76y old Female who presents with a chief complaint of Currently admitted to medicine with the primary diagnosis of Melena      INTERVAL HPI AND OVERNIGHT EVENTS:  Patient was examined and seen at bedside. This morning she is resting comfortably in bed and reports no issues or overnight events.    REVIEW OF SYMPTOMS:      OBJECTIVE  PAST MEDICAL & SURGICAL HISTORY  HTN (hypertension)    Heart murmur    Eczema    Anemia    H/O appendicitis    H/O cholecystitis      ALLERGIES:  aspirin (Rash)  latex (Unknown)  penicillins (Unknown)    MEDICATIONS:  STANDING MEDICATIONS  dextrose 40% Gel 15 Gram(s) Oral once  dextrose 5%. 1000 milliLiter(s) IV Continuous <Continuous>  dextrose 50% Injectable 25 Gram(s) IV Push once  glucagon  Injectable 1 milliGRAM(s) IntraMuscular once  heparin   Injectable 5000 Unit(s) SubCutaneous every 12 hours  insulin lispro (ADMELOG) corrective regimen sliding scale   SubCutaneous three times a day before meals  iron sucrose Injectable 200 milliGRAM(s) IV Push once  metoprolol tartrate 25 milliGRAM(s) Oral two times a day  sodium zirconium cyclosilicate 10 Gram(s) Oral two times a day    PRN MEDICATIONS  acetaminophen     Tablet .. 650 milliGRAM(s) Oral every 6 hours PRN  aluminum hydroxide/magnesium hydroxide/simethicone Suspension 30 milliLiter(s) Oral every 4 hours PRN  melatonin 3 milliGRAM(s) Oral at bedtime PRN  ondansetron Injectable 4 milliGRAM(s) IV Push every 8 hours PRN      VITAL SIGNS: Last 24 Hours  T(C): 35.8 (20 Jan 2022 12:59), Max: 35.8 (20 Jan 2022 12:59)  T(F): 96.4 (20 Jan 2022 12:59), Max: 96.4 (20 Jan 2022 12:59)  HR: 72 (20 Jan 2022 12:59) (72 - 78)  BP: 129/61 (20 Jan 2022 12:59) (129/61 - 143/64)  BP(mean): --  RR: 18 (20 Jan 2022 12:59) (18 - 18)  SpO2: 93% (20 Jan 2022 12:59) (93% - 98%)    LABS:                        7.1    2.93  )-----------( 93       ( 20 Jan 2022 05:15 )             24.7     01-20    144  |  115<H>  |  33<H>  ----------------------------<  83  5.4<H>   |  17  |  2.1<H>    Ca    8.2<L>      20 Jan 2022 05:15  Mg     2.1     01-20    TPro  6.0  /  Alb  3.4<L>  /  TBili  0.5  /  DBili  x   /  AST  15  /  ALT  <5  /  AlkPhos  72  01-20      RADIOLOGY:      PHYSICAL EXAM:  CONSTITUTIONAL: No acute distress, well-developed, well-groomed, AAOx3  HEAD: Atraumatic, normocephalic  ENT: Supple  PULMONARY: Clear to auscultation bilaterally  CARDIOVASCULAR: Regular rate and rhythm; no murmurs, rubs, or gallops  GASTROINTESTINAL: Soft, non-tender, non-distended; bowel sounds present  MUSCULOSKELETAL: 2+ peripheral pulses; no clubbing, no cyanosis, trace edema

## 2022-01-21 LAB
ALBUMIN SERPL ELPH-MCNC: 3.5 G/DL — SIGNIFICANT CHANGE UP (ref 3.5–5.2)
ALP SERPL-CCNC: 75 U/L — SIGNIFICANT CHANGE UP (ref 30–115)
ALT FLD-CCNC: 6 U/L — SIGNIFICANT CHANGE UP (ref 0–41)
ANION GAP SERPL CALC-SCNC: 12 MMOL/L — SIGNIFICANT CHANGE UP (ref 7–14)
AST SERPL-CCNC: 17 U/L — SIGNIFICANT CHANGE UP (ref 0–41)
BASOPHILS # BLD AUTO: 0.01 K/UL — SIGNIFICANT CHANGE UP (ref 0–0.2)
BASOPHILS NFR BLD AUTO: 0.3 % — SIGNIFICANT CHANGE UP (ref 0–1)
BILIRUB SERPL-MCNC: 0.6 MG/DL — SIGNIFICANT CHANGE UP (ref 0.2–1.2)
BUN SERPL-MCNC: 33 MG/DL — HIGH (ref 10–20)
CALCIUM SERPL-MCNC: 8.2 MG/DL — LOW (ref 8.5–10.1)
CHLORIDE SERPL-SCNC: 116 MMOL/L — HIGH (ref 98–110)
CO2 SERPL-SCNC: 16 MMOL/L — LOW (ref 17–32)
CREAT SERPL-MCNC: 2.1 MG/DL — HIGH (ref 0.7–1.5)
EOSINOPHIL # BLD AUTO: 0.15 K/UL — SIGNIFICANT CHANGE UP (ref 0–0.7)
EOSINOPHIL NFR BLD AUTO: 5.2 % — SIGNIFICANT CHANGE UP (ref 0–8)
GLUCOSE BLDC GLUCOMTR-MCNC: 101 MG/DL — HIGH (ref 70–99)
GLUCOSE BLDC GLUCOMTR-MCNC: 103 MG/DL — HIGH (ref 70–99)
GLUCOSE BLDC GLUCOMTR-MCNC: 107 MG/DL — HIGH (ref 70–99)
GLUCOSE BLDC GLUCOMTR-MCNC: 83 MG/DL — SIGNIFICANT CHANGE UP (ref 70–99)
GLUCOSE SERPL-MCNC: 83 MG/DL — SIGNIFICANT CHANGE UP (ref 70–99)
HCT VFR BLD CALC: 24.6 % — LOW (ref 37–47)
HGB BLD-MCNC: 7.3 G/DL — LOW (ref 12–16)
IMM GRANULOCYTES NFR BLD AUTO: 0.3 % — SIGNIFICANT CHANGE UP (ref 0.1–0.3)
LYMPHOCYTES # BLD AUTO: 0.73 K/UL — LOW (ref 1.2–3.4)
LYMPHOCYTES # BLD AUTO: 25.3 % — SIGNIFICANT CHANGE UP (ref 20.5–51.1)
MAGNESIUM SERPL-MCNC: 2.2 MG/DL — SIGNIFICANT CHANGE UP (ref 1.8–2.4)
MCHC RBC-ENTMCNC: 27 PG — SIGNIFICANT CHANGE UP (ref 27–31)
MCHC RBC-ENTMCNC: 29.7 G/DL — LOW (ref 32–37)
MCV RBC AUTO: 91.1 FL — SIGNIFICANT CHANGE UP (ref 81–99)
MONOCYTES # BLD AUTO: 0.49 K/UL — SIGNIFICANT CHANGE UP (ref 0.1–0.6)
MONOCYTES NFR BLD AUTO: 17 % — HIGH (ref 1.7–9.3)
NEUTROPHILS # BLD AUTO: 1.5 K/UL — SIGNIFICANT CHANGE UP (ref 1.4–6.5)
NEUTROPHILS NFR BLD AUTO: 51.9 % — SIGNIFICANT CHANGE UP (ref 42.2–75.2)
NRBC # BLD: 0 /100 WBCS — SIGNIFICANT CHANGE UP (ref 0–0)
PLATELET # BLD AUTO: 90 K/UL — LOW (ref 130–400)
POTASSIUM SERPL-MCNC: 5.4 MMOL/L — HIGH (ref 3.5–5)
POTASSIUM SERPL-SCNC: 5.4 MMOL/L — HIGH (ref 3.5–5)
PROT SERPL-MCNC: 6.3 G/DL — SIGNIFICANT CHANGE UP (ref 6–8)
RBC # BLD: 2.7 M/UL — LOW (ref 4.2–5.4)
RBC # FLD: 17.5 % — HIGH (ref 11.5–14.5)
SODIUM SERPL-SCNC: 144 MMOL/L — SIGNIFICANT CHANGE UP (ref 135–146)
WBC # BLD: 2.89 K/UL — LOW (ref 4.8–10.8)
WBC # FLD AUTO: 2.89 K/UL — LOW (ref 4.8–10.8)

## 2022-01-21 PROCEDURE — 99233 SBSQ HOSP IP/OBS HIGH 50: CPT

## 2022-01-21 RX ORDER — SODIUM ZIRCONIUM CYCLOSILICATE 10 G/10G
10 POWDER, FOR SUSPENSION ORAL ONCE
Refills: 0 | Status: COMPLETED | OUTPATIENT
Start: 2022-01-21 | End: 2022-01-21

## 2022-01-21 RX ORDER — IRON SUCROSE 20 MG/ML
200 INJECTION, SOLUTION INTRAVENOUS
Refills: 0 | Status: COMPLETED | OUTPATIENT
Start: 2022-01-22 | End: 2022-01-24

## 2022-01-21 RX ORDER — DEXTROSE 50 % IN WATER 50 %
25 SYRINGE (ML) INTRAVENOUS ONCE
Refills: 0 | Status: DISCONTINUED | OUTPATIENT
Start: 2022-01-21 | End: 2022-01-21

## 2022-01-21 RX ORDER — ACETAMINOPHEN 500 MG
650 TABLET ORAL EVERY 6 HOURS
Refills: 0 | Status: DISCONTINUED | OUTPATIENT
Start: 2022-01-21 | End: 2022-01-24

## 2022-01-21 RX ORDER — DEXTROSE 50 % IN WATER 50 %
25 SYRINGE (ML) INTRAVENOUS ONCE
Refills: 0 | Status: DISCONTINUED | OUTPATIENT
Start: 2022-01-21 | End: 2022-01-24

## 2022-01-21 RX ORDER — INSULIN LISPRO 100/ML
10 VIAL (ML) SUBCUTANEOUS ONCE
Refills: 0 | Status: DISCONTINUED | OUTPATIENT
Start: 2022-01-21 | End: 2022-01-21

## 2022-01-21 RX ADMIN — HEPARIN SODIUM 5000 UNIT(S): 5000 INJECTION INTRAVENOUS; SUBCUTANEOUS at 05:40

## 2022-01-21 RX ADMIN — Medication 25 MILLIGRAM(S): at 05:40

## 2022-01-21 RX ADMIN — SODIUM ZIRCONIUM CYCLOSILICATE 10 GRAM(S): 10 POWDER, FOR SUSPENSION ORAL at 21:46

## 2022-01-21 RX ADMIN — Medication 25 MILLIGRAM(S): at 17:19

## 2022-01-21 NOTE — PROGRESS NOTE ADULT - SUBJECTIVE AND OBJECTIVE BOX
LATRICIA ARREAGA 76y Female  MRN#: [id number]   Hospital Day: 4d    HPI:  74 y F with PMH of severe aortic stenosis, HTN, morbidly obese, CKD, recurrent admission for iron infusions, blood transfusions,immuni  Immunofixation showed weak IgG lambda migrating para protein. Free light chain ratio 2.1. Normal calcium.   EGD and VCE was done. EGD revealed gastritis and VCE showed bleeding in small bowel.    presented to the ED with an episode of dark stools.  She had one episode last night , no FBPR, OLEKSANDR done in the ED was negative, no stool in the rectal vault.   pt reports shortness of breath, cough with sputum production.     in the ED,pt's VS T(C): 36.7 (01-17-22 @ 23:30), Max: 36.7 (01-17-22 @ 16:03)  HR: 78 (01-17-22 @ 23:30) (78 - 96)  BP: 150/72 (01-17-22 @ 23:30) (144/63 - 150/72)  RR: 18 (01-17-22 @ 23:30) (18 - 18)  SpO2: 99% (01-17-22 @ 23:30) (98% - 99%), labs done showed COVID-19 PCR: Detected, Hb: 8.1WBC Count: 2.96 Platelet Count: 99 Creatinine: 2.0  on PE, pt had bruising on left upper extremity, new per the patient.   CXR showed right opacities .  CT Abdomen and Pelvis No Cont (01.17.22 @ 19:09) >  Scattered prominent periaortic, periportal mesenteric lymph nodes.   Finding is nonspecific and can represent infectious, inflammatory or   neoplastic etiologies.  Indeterminate left adrenal nodule. Consider nonemergent/outpatient   follow-up with CT adrenal protocol as clinically indicated.    pt received Famotidine, insulin for hyperkalemia, NS 1 L. on room air.   pt is admitted for workup/management (17 Jan 2022 23:50)      SUBJECTIVE  Patient is a 76y old Female who presents with a chief complaint of Currently admitted to medicine with the primary diagnosis of Melena      INTERVAL HPI AND OVERNIGHT EVENTS:  Patient was examined and seen at bedside. This morning she is resting comfortably in bed and reports no issues or overnight events.    REVIEW OF SYMPTOMS:    OBJECTIVE  PAST MEDICAL & SURGICAL HISTORY  HTN (hypertension)    Heart murmur    Eczema    Anemia    H/O appendicitis    H/O cholecystitis      ALLERGIES:  aspirin (Rash)  latex (Unknown)  penicillins (Unknown)    MEDICATIONS:  STANDING MEDICATIONS  dextrose 40% Gel 15 Gram(s) Oral once  dextrose 5%. 1000 milliLiter(s) IV Continuous <Continuous>  dextrose 50% Injectable 25 Gram(s) IV Push once  glucagon  Injectable 1 milliGRAM(s) IntraMuscular once  heparin   Injectable 5000 Unit(s) SubCutaneous every 12 hours  insulin lispro (ADMELOG) corrective regimen sliding scale   SubCutaneous three times a day before meals  metoprolol tartrate 25 milliGRAM(s) Oral two times a day    PRN MEDICATIONS  acetaminophen     Tablet .. 650 milliGRAM(s) Oral every 6 hours PRN  acetaminophen     Tablet .. 650 milliGRAM(s) Oral every 6 hours PRN  aluminum hydroxide/magnesium hydroxide/simethicone Suspension 30 milliLiter(s) Oral every 4 hours PRN  melatonin 3 milliGRAM(s) Oral at bedtime PRN  ondansetron Injectable 4 milliGRAM(s) IV Push every 8 hours PRN      VITAL SIGNS: Last 24 Hours  T(C): 36.6 (21 Jan 2022 03:53), Max: 36.6 (21 Jan 2022 03:53)  T(F): 97.8 (21 Jan 2022 03:53), Max: 97.8 (21 Jan 2022 03:53)  HR: 76 (21 Jan 2022 05:38) (72 - 76)  BP: 162/71 (21 Jan 2022 05:38) (116/45 - 162/71)  BP(mean): 69 (21 Jan 2022 03:53) (69 - 69)  RR: 18 (21 Jan 2022 03:53) (18 - 18)  SpO2: 96% (20 Jan 2022 20:16) (93% - 97%)    LABS:                        7.3    2.89  )-----------( 90       ( 21 Jan 2022 04:30 )             24.6     01-21    144  |  116<H>  |  33<H>  ----------------------------<  83  5.4<H>   |  16<L>  |  2.1<H>    Ca    8.2<L>      21 Jan 2022 04:30  Mg     2.2     01-21    TPro  6.3  /  Alb  3.5  /  TBili  0.6  /  DBili  x   /  AST  17  /  ALT  6   /  AlkPhos  75  01-21      RADIOLOGY:      PHYSICAL EXAM:  CONSTITUTIONAL: No acute distress, well-developed, well-groomed, AAOx3  HEAD: Atraumatic, normocephalic  ENT: Supple  PULMONARY: Clear to auscultation bilaterally  CARDIOVASCULAR: Regular rate and rhythm; no murmurs, rubs, or gallops  GASTROINTESTINAL: Soft, non-tender, non-distended; bowel sounds present  MUSCULOSKELETAL: 2+ peripheral pulses; no clubbing, no cyanosis, trace edema

## 2022-01-21 NOTE — PROGRESS NOTE ADULT - SUBJECTIVE AND OBJECTIVE BOX
Patient is a 76y old  Female who presents with a chief complaint of     Subjective:      Vital Signs Last 24 Hrs  T(C): 36.1 (2022 13:31), Max: 36.6 (2022 03:53)  T(F): 97 (2022 13:31), Max: 97.8 (2022 03:53)  HR: 70 (2022 13:31) (70 - 76)  BP: 150/69 (2022 13:31) (116/45 - 162/71)  BP(mean): 69 (2022 03:53) (69 - 69)  RR: 18 (2022 13:31) (18 - 18)  SpO2: 96% (2022 20:16) (96% - 97%)    PHYSICAL EXAM  General: adult in NAD  HEENT: clear oropharynx, anicteric sclera, pink conjunctiva  Neck: supple  CV: normal S1/S2 with no murmur rubs or gallops  Lungs: positive air movement b/l ant lungs,clear to auscultation, no wheezes, no rales  Abdomen: soft non-tender non-distended, no hepatosplenomegaly  Ext: no clubbing cyanosis or edema  Skin: no rashes and no petechiae  Neuro: alert and oriented X 4, no focal deficits    MEDICATIONS  (STANDING):  dextrose 40% Gel 15 Gram(s) Oral once  dextrose 5%. 1000 milliLiter(s) (50 mL/Hr) IV Continuous <Continuous>  dextrose 50% Injectable 25 Gram(s) IV Push once  glucagon  Injectable 1 milliGRAM(s) IntraMuscular once  heparin   Injectable 5000 Unit(s) SubCutaneous every 12 hours  insulin lispro (ADMELOG) corrective regimen sliding scale   SubCutaneous three times a day before meals  metoprolol tartrate 25 milliGRAM(s) Oral two times a day    MEDICATIONS  (PRN):  acetaminophen     Tablet .. 650 milliGRAM(s) Oral every 6 hours PRN Mild Pain (1 - 3)  acetaminophen     Tablet .. 650 milliGRAM(s) Oral every 6 hours PRN Temp greater or equal to 38C (100.4F), Mild Pain (1 - 3)  aluminum hydroxide/magnesium hydroxide/simethicone Suspension 30 milliLiter(s) Oral every 4 hours PRN Dyspepsia  melatonin 3 milliGRAM(s) Oral at bedtime PRN Insomnia  ondansetron Injectable 4 milliGRAM(s) IV Push every 8 hours PRN Nausea and/or Vomiting      LABS:                          7.3    2.89  )-----------( 90       ( 2022 04:30 )             24.6         Mean Cell Volume : 91.1 fL  Mean Cell Hemoglobin : 27.0 pg  Mean Cell Hemoglobin Concentration : 29.7 g/dL  Auto Neutrophil # : 1.50 K/uL  Auto Lymphocyte # : 0.73 K/uL  Auto Monocyte # : 0.49 K/uL  Auto Eosinophil # : 0.15 K/uL  Auto Basophil # : 0.01 K/uL  Auto Neutrophil % : 51.9 %  Auto Lymphocyte % : 25.3 %  Auto Monocyte % : 17.0 %  Auto Eosinophil % : 5.2 %  Auto Basophil % : 0.3 %      Serial CBC's   @ 04:30  Hct-24.6 / Hgb-7.3 / Plat-90 / RBC-2.70 / WBC-2.89  Serial CBC's   @ 18:08  Hct-24.1 / Hgb-7.1 / Plat-108 / RBC-2.64 / WBC-2.68  Serial CBC's   @ 05:15  Hct-24.7 / Hgb-7.1 / Plat-93 / RBC-2.68 / WBC-2.93  Serial CBC's   @ 11:00  Hct-26.3 / Hgb-7.7 / Plat-104 / RBC-2.87 / WBC-2.62  Serial CBC's   @ 05:43  Hct-25.7 / Hgb-7.6 / Plat-94 / RBC-2.81 / WBC-2.71  Serial CBC's   @ 06:03  Hct-27.1 / Hgb-7.8 / Plat-91 / RBC-2.95 / WBC-2.72  Serial CBC's   @ 17:24  Hct-27.4 / Hgb-8.1 / Plat-99 / RBC-3.02 / WBC-2.96          144  |  116<H>  |  33<H>  ----------------------------<  83  5.4<H>   |  16<L>  |  2.1<H>    Ca    8.2<L>      2022 04:30  Mg     2.2         TPro  6.3  /  Alb  3.5  /  TBili  0.6  /  DBili  x   /  AST  17  /  ALT  6   /  AlkPhos  75      Ferritin, Serum: 28 ng/mL (:43)  Reticulocyte Percent: 2.8 % ()  Vitamin B12, Serum: 284 pg/mL ()  Folate, Serum: 6.9 ng/mL ()  Ferritin, Serum: 29 ng/mL (:03)  Folate, Serum: 6.2 ng/mL (:03)  Vitamin B12, Serum: 345 pg/mL (:03)    FABY Lambda: 7.77 mg/dL (:43)  FABY Kappa: 11.98 mg/dL (43)  Quantitative Ig mg/dL ()  Quantitative IgM: 148 mg/dL (:)  Quantitative IgA: 222 mg/dL (:43)  FABY Lambda: 7.77 mg/dL ()  FABY Kappa: 11.98 mg/dL (:43)                      BLOOD SMEAR INTERPRETATION:       RADIOLOGY & ADDITIONAL STUDIES:     Patient is a 76y old  Female who presents with a chief complaint of     Subjective:      Vital Signs Last 24 Hrs  T(C): 36.1 (2022 13:31), Max: 36.6 (2022 03:53)  T(F): 97 (2022 13:31), Max: 97.8 (2022 03:53)  HR: 70 (2022 13:31) (70 - 76)  BP: 150/69 (2022 13:31) (116/45 - 162/71)  BP(mean): 69 (2022 03:53) (69 - 69)  RR: 18 (2022 13:31) (18 - 18)  SpO2: 96% (2022 20:16) (96% - 97%)      MEDICATIONS  (STANDING):  dextrose 40% Gel 15 Gram(s) Oral once  dextrose 5%. 1000 milliLiter(s) (50 mL/Hr) IV Continuous <Continuous>  dextrose 50% Injectable 25 Gram(s) IV Push once  glucagon  Injectable 1 milliGRAM(s) IntraMuscular once  heparin   Injectable 5000 Unit(s) SubCutaneous every 12 hours  insulin lispro (ADMELOG) corrective regimen sliding scale   SubCutaneous three times a day before meals  metoprolol tartrate 25 milliGRAM(s) Oral two times a day    MEDICATIONS  (PRN):  acetaminophen     Tablet .. 650 milliGRAM(s) Oral every 6 hours PRN Mild Pain (1 - 3)  acetaminophen     Tablet .. 650 milliGRAM(s) Oral every 6 hours PRN Temp greater or equal to 38C (100.4F), Mild Pain (1 - 3)  aluminum hydroxide/magnesium hydroxide/simethicone Suspension 30 milliLiter(s) Oral every 4 hours PRN Dyspepsia  melatonin 3 milliGRAM(s) Oral at bedtime PRN Insomnia  ondansetron Injectable 4 milliGRAM(s) IV Push every 8 hours PRN Nausea and/or Vomiting      LABS:                          7.3    2.89  )-----------( 90       ( 2022 04:30 )             24.6         Mean Cell Volume : 91.1 fL  Mean Cell Hemoglobin : 27.0 pg  Mean Cell Hemoglobin Concentration : 29.7 g/dL  Auto Neutrophil # : 1.50 K/uL  Auto Lymphocyte # : 0.73 K/uL  Auto Monocyte # : 0.49 K/uL  Auto Eosinophil # : 0.15 K/uL  Auto Basophil # : 0.01 K/uL  Auto Neutrophil % : 51.9 %  Auto Lymphocyte % : 25.3 %  Auto Monocyte % : 17.0 %  Auto Eosinophil % : 5.2 %  Auto Basophil % : 0.3 %      Serial CBC's   @ 04:30  Hct-24.6 / Hgb-7.3 / Plat-90 / RBC-2.70 / WBC-2.89  Serial CBC's   @ 18:08  Hct-24.1 / Hgb-7.1 / Plat-108 / RBC-2.64 / WBC-2.68  Serial CBC's   @ 05:15  Hct-24.7 / Hgb-7.1 / Plat-93 / RBC-2.68 / WBC-2.93  Serial CBC's   @ 11:00  Hct-26.3 / Hgb-7.7 / Plat-104 / RBC-2.87 / WBC-2.62  Serial CBC's   @ 05:43  Hct-25.7 / Hgb-7.6 / Plat-94 / RBC-2.81 / WBC-2.71  Serial CBC's   @ 06:03  Hct-27.1 / Hgb-7.8 / Plat-91 / RBC-2.95 / WBC-2.72  Serial CBC's   @ 17:24  Hct-27.4 / Hgb-8.1 / Plat-99 / RBC-3.02 / WBC-2.96          144  |  116<H>  |  33<H>  ----------------------------<  83  5.4<H>   |  16<L>  |  2.1<H>    Ca    8.2<L>      2022 04:30  Mg     2.2         TPro  6.3  /  Alb  3.5  /  TBili  0.6  /  DBili  x   /  AST  17  /  ALT  6   /  AlkPhos  75      Ferritin, Serum: 28 ng/mL ( @ 05:43)  Reticulocyte Percent: 2.8 % ( @ 05:43)  Vitamin B12, Serum: 284 pg/mL ()  Folate, Serum: 6.9 ng/mL ()  Ferritin, Serum: 29 ng/mL (:)  Folate, Serum: 6.2 ng/mL (:)  Vitamin B12, Serum: 345 pg/mL (:)    FABY Lambda: 7.77 mg/dL ()  FABY Kappa: 11.98 mg/dL ()  Quantitative Ig mg/dL ()  Quantitative IgM: 148 mg/dL ()  Quantitative IgA: 222 mg/dL ()  FABY Lambda: 7.77 mg/dL ()  FABY Kappa: 11.98 mg/dL ()                      BLOOD SMEAR INTERPRETATION:       RADIOLOGY & ADDITIONAL STUDIES:

## 2022-01-21 NOTE — PROGRESS NOTE ADULT - SUBJECTIVE AND OBJECTIVE BOX
LATRICIA ARREAGA  76y  Female      Patient is a 76y old  Female who presents with a chief complaint of cough     INTERVAL HPI/OVERNIGHT EVENTS:      ******************************* REVIEW OF SYSTEMS:**********************************************    All other review of systems negative    *********************** VITALS ******************************************    T(F): 97.8 (01-21-22 @ 03:53)  HR: 76 (01-21-22 @ 05:38) (73 - 76)  BP: 162/71 (01-21-22 @ 05:38) (116/45 - 162/71)  RR: 18 (01-21-22 @ 03:53) (18 - 18)  SpO2: 96% (01-20-22 @ 20:16) (96% - 97%)    01-20-22 @ 07:01  -  01-21-22 @ 07:00  --------------------------------------------------------  IN: 210 mL / OUT: 0 mL / NET: 210 mL            01-20-22 @ 07:01  -  01-21-22 @ 07:00  --------------------------------------------------------  IN: 210 mL / OUT: 0 mL / NET: 210 mL        ******************************** PHYSICAL EXAM:**************************************************  GENERAL: NAD    PSYCH: no agitation, baseline mentation  HEENT:     NERVOUS SYSTEM:  Alert & Oriented X3,     PULMONARY: GRAHAM, CTA    CARDIOVASCULAR: S1S2 RRR    GI: Soft, NT, ND; BS present.    EXTREMITIES:  2+ Peripheral Pulses, No clubbing, cyanosis, or edema    LYMPH: No lymphadenopathy noted    SKIN: No rashes or lesions      **************************** LABS *******************************************************                          7.3    2.89  )-----------( 90       ( 21 Jan 2022 04:30 )             24.6     01-21    144  |  116<H>  |  33<H>  ----------------------------<  83  5.4<H>   |  16<L>  |  2.1<H>    Ca    8.2<L>      21 Jan 2022 04:30  Mg     2.2     01-21    TPro  6.3  /  Alb  3.5  /  TBili  0.6  /  DBili  x   /  AST  17  /  ALT  6   /  AlkPhos  75  01-21          Lactate Trend        CAPILLARY BLOOD GLUCOSE      POCT Blood Glucose.: 83 mg/dL (21 Jan 2022 11:14)          **************************Active Medications *******************************************  aspirin (Rash)  latex (Unknown)  penicillins (Unknown)      acetaminophen     Tablet .. 650 milliGRAM(s) Oral every 6 hours PRN  acetaminophen     Tablet .. 650 milliGRAM(s) Oral every 6 hours PRN  aluminum hydroxide/magnesium hydroxide/simethicone Suspension 30 milliLiter(s) Oral every 4 hours PRN  dextrose 40% Gel 15 Gram(s) Oral once  dextrose 5%. 1000 milliLiter(s) IV Continuous <Continuous>  dextrose 50% Injectable 25 Gram(s) IV Push once  glucagon  Injectable 1 milliGRAM(s) IntraMuscular once  heparin   Injectable 5000 Unit(s) SubCutaneous every 12 hours  insulin lispro (ADMELOG) corrective regimen sliding scale   SubCutaneous three times a day before meals  melatonin 3 milliGRAM(s) Oral at bedtime PRN  metoprolol tartrate 25 milliGRAM(s) Oral two times a day  ondansetron Injectable 4 milliGRAM(s) IV Push every 8 hours PRN      ***************************************************  RADIOLOGY & ADDITIONAL TESTS:    Imaging Personally Reviewed:  [ ] YES  [ ] NO    HEALTH ISSUES - PROBLEM Dx:

## 2022-01-21 NOTE — PROGRESS NOTE ADULT - ASSESSMENT
73 yo F with PMHx of aortic stenosis, HTN, obesity, CKD stage 4, transfusion dependent anemia, presented for rectal bleeding. Pt also reported shortness of breath with cough/sputum production and tested positive for COVID-19.     Hem/Onc consulted for pancytopenia.     #Chronic pancytopenia  #Hx of chronic anemia (likely due to CKD4 and obscure GI bleed), was on retacrit as O/P and received 2U PRBC on 12/30/21. Pt received Venofer 2 weeks ago.   -EGD 2020 revealed gastritis and VCE showed bleeding in small bowel.   -Pt also has covid-19 pna which can contribute to pancytopenia   -Peripheral smear:  -Workup so far:     Iron serum: 43 (9/2021)    TIBC 286    Ferritin 26 (in 11/2021)   Protein Electrophoresis, Serum (01.08.20 @ 11:09)    Protein Total, Serum: 6.9 g/dL    Serum Protein Electrophoresis Interp: Weak Gamma-Migrating Paraprotein Identified   Immunoglobulin Free Light Chains, Serum (01.10.20 @ 07:02)    FABY Kappa: 12.18 mg/dL    FABY Lambda: 5.84 mg/dL (01.10.20 @ 07:02)    Darrow/Lambda Free Light Chain Ratio, Serum: 2.09 Ratio   No splenomegaly on CT    RECOMMENDATIONS:    -Pt has evidence of iron deficiency per last ferritin study and is receiving Venofer as O/P. S/p venofer 200mg x1 on 1/19/22; Give another dose of venofer tomorrow 1/22/22, and if pt is still inpatient, can given 3rd dose in 48 hours after that  -B12, folate, LDH and hapto WNL  -F/u SPEP, IF; Kappa: 11.98, lambda 7.77, ratio normal; IgG, IgM and IgA level WNL  -Continue Retacrit [id number] weekly   -Treat underlying infection   -GI follow up   -Pharmacologic DVT ppx if plt >30k    Pt can come to [facility] post discharge to receive Venofer and Retacrit as she has been on isolation for >14 days since diagnosis. Please see if she can receive Retacrit in nursing home as well.

## 2022-01-21 NOTE — PROGRESS NOTE ADULT - ASSESSMENT
74 y F with PMH of severe aortic stenosis, HTN, morbidly obese, CKD, recurrent admission for iron infusions, blood transfusions,immuni  Immunofixation showed weak IgG lambda migrating para protein. Free light chain ratio 2.1. Normal calcium. EGD and VCE was done. EGD revealed gastritis and VCE showed bleeding in small bowel.   EGD, Colono, VCE reviewed : no signs of active bleeding , presence of gastritis and diverticulosis. Pt presented to the ED with an episode of dark stools.  She had one episode last night, no FBPR, OLEKSANDR done in the ED was negative, no stool in the rectal vault. pt reports shortness of breath, cough with sputum production.     #Covid PNA  #Right sided opactiy on CXR  - pt was diagnosed 2 weeks ago. did not receive any treatment.   - was on Doxycycline & rocephin, discontinued as low procal  - QTc 498 msec on ECG done on 1/11, fu repeat ECG  - defer systemic steroids for now.   - inflammatory markers: CRP 7, Procal 0.08, Dimer 397, Ferritin 29    #Dark stools   - pt has a hx of DARRIN, follows with Dr Gaston.   - OLEKSANDR was negative in the ED.  -CT Abdomen and Pelvis No Cont (01.17.22 @ 19:09) >Scattered prominent periaortic, periportal mesenteric lymph nodes. Finding is nonspecific and can represent infectious, inflammatory or neoplastic etiologies. Indeterminate left adrenal nodule. Consider nonemergent/outpatient follow-up with CT adrenal protocol as clinically indicated.  - active type and screen.   - pt has EGD and Colono in 2019 and 2020 showing gastritis, diverticulosis, grade 2 hemorrhoids.      #DARRIN  ( Baseline Hb 7-8)  #Pancytopenia  - Heme onc following, known pt of Dr. Gaston, recs appreciated  - pancytopenia could be related to COVID  -Repeat iron studies including serum Iron, TIBC, ferritin), reticulocyte count, LDH, haptoglobin, vit b12, folate (ordered for 1/18)  -Check myeloma panel (SPEP, UPEP, serum IF, FLC, Ig levels) (ordered for 1/18)  -Continue Retacrit [id number] weekly --> dose given 1/18    dvt ppx: Heparin SQ  GI prophylaxis: Not req  Code: Full code  Diet: Dash/ Tlc    #Progress Note Handoff  Pending (specify): Safe d/c planning.   Disposition: Home vs SNF

## 2022-01-21 NOTE — PROGRESS NOTE ADULT - ASSESSMENT
74 y F with PMH of severe aortic stenosis, HTN, morbidly obese, CKD, recurrent admission for iron infusions, blood transfusions,immuni  Immunofixation showed weak IgG lambda migrating para protein. Free light chain ratio 2.1. Normal calcium. EGD and VCE was done. EGD revealed gastritis and VCE showed bleeding in small bowel.   EGD, Colono, VCE reviewed : no signs of active bleeding , presence of gastritis and diverticulosis. Pt presented to the ED with an episode of dark stools.  She had one episode last night, no FBPR, OLEKSANDR done in the ED was negative, no stool in the rectal vault. pt reports shortness of breath, cough with sputum production.     #DARRIN  #Pancytopenia  - Heme onc following, known pt of Dr. Gaston, recs appreciated, out pt fup per Heme/Onc  - pancytopenia could be related to COVID  -Repeat iron studies including serum Iron, TIBC, ferritin), reticulocyte count, , haptoglobin 103, vit b12 284, folate 6.9  -Check myeloma panel (SPEP 6, UPEP pending, serum IF pending  - Ig levels: IgG 1300, IgA 222, IgM 148.  -Continue Retacrit 86769T weekly --> dose given 1/18, follows Dr. Gaston outpt weekly  -Pharmacologic DVT ppx if plt >30k , starting on heparin PPX   -Venofer today 01/20  -Hg stable    #Dark stools per Pt  #Low Hg likely due to DARRIN  - pt has a hx of DARRIN, follows with Dr Gaston.   - hb 8.1, plt 99, WBC 2 K  - OLEKSANDR was negative in the ED.  - Pt has had no episodes since admission  -CT Abdomen and Pelvis No Cont (01.17.22 @ 19:09) >Scattered prominent periaortic, periportal mesenteric lymph nodes. Finding is nonspecific and can represent infectious, inflammatory or neoplastic etiologies. Indeterminate left adrenal nodule. Consider nonemergent/outpatient follow-up with CT adrenal protocol as clinically indicated.  - active type and screen.  - possibly due to viral cause, ro MM. pt has a hx of Gamma spike protein.   - if pt has recurrent bleeding, consider GI consult. pt has EGD and Colono in 2019 and 2020 showing gastritis, diverticulosis, grade 2 hemorrhoids. no signs of active bleeding on VCE.  - consider repeat EGD/colono if Hg continues to drop    #Dizziness  - Pt feels dizzy when she ambulates  - vitals are stable  - could be due to low Hg  - Orthostatics    #Covid positive:  #PNA  #Right sided opactiy on CXR  #Mild Chest tightness  - pt was diagnosed 2 weeks ago. did not receive any treatment.   - not on oxygen in the ED.   - was on Doxycycline & rocephin, discontinued as low procal  - QTc 498 msec on ECG done on 1/11, fu repeat ECG  - defer systemic steroids for now.   - monitor VS.   - inflammatory markers: CRP 7, Procal 0.08, Dimer 397, Ferritin 29  - mild chest pain on 01/20/2022: EKG showed Bifascicular block, similar to previous ones going back to 2019    dvt ppx: Heparin SQ  GI prophylaxis: Not req  Code: Full code  Diet: Dash/ Tlc  Dispo: Pending placement decision, might be SNF with     74 y F with PMH of severe aortic stenosis, HTN, morbidly obese, CKD, recurrent admission for iron infusions, blood transfusions,immuni  Immunofixation showed weak IgG lambda migrating para protein. Free light chain ratio 2.1. Normal calcium. EGD and VCE was done. EGD revealed gastritis and VCE showed bleeding in small bowel.   EGD, Colono, VCE reviewed : no signs of active bleeding , presence of gastritis and diverticulosis. Pt presented to the ED with an episode of dark stools.  She had one episode last night, no FBPR, OLEKSANDR done in the ED was negative, no stool in the rectal vault. pt reports shortness of breath, cough with sputum production.     #DARRIN  #Pancytopenia  - Heme onc following, known pt of Dr. Gaston, recs appreciated, out pt fup per Heme/Onc  - pancytopenia could be related to COVID  -Repeat iron studies including serum Iron, TIBC, ferritin), reticulocyte count, , haptoglobin 103, vit b12 284, folate 6.9  -Check myeloma panel (SPEP 6, UPEP pending, serum IF pending  - Ig levels: IgG 1300, IgA 222, IgM 148.  -Continue Retacrit 45854G weekly --> dose given 1/18, follows Dr. Gaston outpt weekly  -Pharmacologic DVT ppx if plt >30k , starting on heparin PPX   -Venofer today 01/20  -Hg stable    #Dark stools per Pt  #Low Hg likely due to DARRIN  - pt has a hx of DARRIN, follows with Dr Gaston.   - hb 8.1, plt 99, WBC 2 K  - OLEKSANDR was negative in the ED.  - Pt has had no episodes since admission  -CT Abdomen and Pelvis No Cont (01.17.22 @ 19:09) >Scattered prominent periaortic, periportal mesenteric lymph nodes. Finding is nonspecific and can represent infectious, inflammatory or neoplastic etiologies. Indeterminate left adrenal nodule. Consider nonemergent/outpatient follow-up with CT adrenal protocol as clinically indicated.  - active type and screen.  - possibly due to viral cause, ro MM. pt has a hx of Gamma spike protein.   - if pt has recurrent bleeding, consider GI consult. pt has EGD and Colono in 2019 and 2020 showing gastritis, diverticulosis, grade 2 hemorrhoids. no signs of active bleeding on VCE.  - consider repeat EGD/colono if Hg continues to drop  - GI consulted for Hg continuing to drop.    #Dizziness  - Pt feels dizzy when she ambulates  - vitals are stable  - could be due to low Hg  - Orthostatics    #Covid positive:  #PNA  #Right sided opactiy on CXR  #Mild Chest tightness  - pt was diagnosed 2 weeks ago. did not receive any treatment.   - not on oxygen in the ED.   - was on Doxycycline & rocephin, discontinued as low procal  - QTc 498 msec on ECG done on 1/11, fu repeat ECG  - defer systemic steroids for now.   - monitor VS.   - inflammatory markers: CRP 7, Procal 0.08, Dimer 397, Ferritin 29  - mild chest pain on 01/20/2022: EKG showed Bifascicular block, similar to previous ones going back to 2019    #Hyperkalemia  - H/o of Hyperkalemia going back to 2017  - Lokelma 10mg daily based on K+ levels  - monitor CMP    dvt ppx: Heparin SQ  GI prophylaxis: Not req  Code: Full code  Diet: Dash/ Tlc  Dispo: Pending placement decision, might be SNF with     74 y F with PMH of severe aortic stenosis, HTN, morbidly obese, CKD, recurrent admission for iron infusions, blood transfusions,immuni  Immunofixation showed weak IgG lambda migrating para protein. Free light chain ratio 2.1. Normal calcium. EGD and VCE was done. EGD revealed gastritis and VCE showed bleeding in small bowel.   EGD, Colono, VCE reviewed : no signs of active bleeding , presence of gastritis and diverticulosis. Pt presented to the ED with an episode of dark stools.  She had one episode last night, no FBPR, OLEKSANDR done in the ED was negative, no stool in the rectal vault. pt reports shortness of breath, cough with sputum production.     #DARRIN  #Pancytopenia  - Heme onc following, known pt of Dr. Gaston, recs appreciated, out pt fup per Heme/Onc  - pancytopenia could be related to COVID  -Repeat iron studies including serum Iron, TIBC, ferritin), reticulocyte count, , haptoglobin 103, vit b12 284, folate 6.9  -Check myeloma panel (SPEP 6, UPEP pending, serum IF pending  - Ig levels: IgG 1300, IgA 222, IgM 148.  -Continue Retacrit 75022S weekly --> dose given 1/18, follows Dr. Gaston outpt weekly  -Pharmacologic DVT ppx if plt >30k , starting on heparin PPX   -Venofer today 01/20 next one sara 01/22    #Dark stools per Pt  #Low Hg likely due to DARRIN  - pt has a hx of DARRIN, follows with Dr Gaston.   - hb 8.1, plt 99, WBC 2 K  - OLEKSANDR was negative in the ED.  - Pt has had no episodes since admission  -CT Abdomen and Pelvis No Cont (01.17.22 @ 19:09) >Scattered prominent periaortic, periportal mesenteric lymph nodes. Finding is nonspecific and can represent infectious, inflammatory or neoplastic etiologies. Indeterminate left adrenal nodule. Consider nonemergent/outpatient follow-up with CT adrenal protocol as clinically indicated.  - active type and screen.  - possibly due to viral cause, ro MM. pt has a hx of Gamma spike protein.   - if pt has recurrent bleeding, consider GI consult. pt has EGD and Colono in 2019 and 2020 showing gastritis, diverticulosis, grade 2 hemorrhoids. no signs of active bleeding on VCE.  - consider repeat EGD/colono if Hg continues to drop  - GI consulted for Hg continuing to drop.    #Dizziness  - Pt feels dizzy when she ambulates  - vitals are stable  - could be due to low Hg  - Orthostatics    #Covid positive:  #PNA  #Right sided opactiy on CXR  #Mild Chest tightness  - pt was diagnosed 2 weeks ago. did not receive any treatment.   - not on oxygen in the ED.   - was on Doxycycline & rocephin, discontinued as low procal  - QTc 498 msec on ECG done on 1/11, fu repeat ECG  - defer systemic steroids for now.   - monitor VS.   - inflammatory markers: CRP 7, Procal 0.08, Dimer 397, Ferritin 29  - mild chest pain on 01/20/2022: EKG showed Bifascicular block, similar to previous ones going back to 2019    #Hyperkalemia  - H/o of Hyperkalemia going back to 2017  - Lokelma 10mg daily based on K+ levels  - monitor CMP    dvt ppx: Heparin SQ  GI prophylaxis: Not req  Code: Full code  Diet: Dash/ Tlc  Dispo: Pending placement decision, might be SNF with

## 2022-01-22 LAB
ALBUMIN SERPL ELPH-MCNC: 3.4 G/DL — LOW (ref 3.5–5.2)
ALP SERPL-CCNC: 75 U/L — SIGNIFICANT CHANGE UP (ref 30–115)
ALT FLD-CCNC: 7 U/L — SIGNIFICANT CHANGE UP (ref 0–41)
ANION GAP SERPL CALC-SCNC: 12 MMOL/L — SIGNIFICANT CHANGE UP (ref 7–14)
AST SERPL-CCNC: 18 U/L — SIGNIFICANT CHANGE UP (ref 0–41)
BILIRUB SERPL-MCNC: 0.5 MG/DL — SIGNIFICANT CHANGE UP (ref 0.2–1.2)
BUN SERPL-MCNC: 36 MG/DL — HIGH (ref 10–20)
CALCIUM SERPL-MCNC: 8.1 MG/DL — LOW (ref 8.5–10.1)
CHLORIDE SERPL-SCNC: 116 MMOL/L — HIGH (ref 98–110)
CO2 SERPL-SCNC: 16 MMOL/L — LOW (ref 17–32)
CREAT SERPL-MCNC: 2 MG/DL — HIGH (ref 0.7–1.5)
GLUCOSE BLDC GLUCOMTR-MCNC: 102 MG/DL — HIGH (ref 70–99)
GLUCOSE BLDC GLUCOMTR-MCNC: 84 MG/DL — SIGNIFICANT CHANGE UP (ref 70–99)
GLUCOSE BLDC GLUCOMTR-MCNC: 91 MG/DL — SIGNIFICANT CHANGE UP (ref 70–99)
GLUCOSE BLDC GLUCOMTR-MCNC: 96 MG/DL — SIGNIFICANT CHANGE UP (ref 70–99)
GLUCOSE SERPL-MCNC: 87 MG/DL — SIGNIFICANT CHANGE UP (ref 70–99)
HCT VFR BLD CALC: 24 % — LOW (ref 37–47)
HGB BLD-MCNC: 7.1 G/DL — LOW (ref 12–16)
MAGNESIUM SERPL-MCNC: 2.2 MG/DL — SIGNIFICANT CHANGE UP (ref 1.8–2.4)
MCHC RBC-ENTMCNC: 27.2 PG — SIGNIFICANT CHANGE UP (ref 27–31)
MCHC RBC-ENTMCNC: 29.6 G/DL — LOW (ref 32–37)
MCV RBC AUTO: 92 FL — SIGNIFICANT CHANGE UP (ref 81–99)
NRBC # BLD: 0 /100 WBCS — SIGNIFICANT CHANGE UP (ref 0–0)
PLATELET # BLD AUTO: 97 K/UL — LOW (ref 130–400)
POTASSIUM SERPL-MCNC: 5.2 MMOL/L — HIGH (ref 3.5–5)
POTASSIUM SERPL-SCNC: 5.2 MMOL/L — HIGH (ref 3.5–5)
PROT SERPL-MCNC: 6.3 G/DL — SIGNIFICANT CHANGE UP (ref 6–8)
RBC # BLD: 2.61 M/UL — LOW (ref 4.2–5.4)
RBC # FLD: 17.4 % — HIGH (ref 11.5–14.5)
SODIUM SERPL-SCNC: 144 MMOL/L — SIGNIFICANT CHANGE UP (ref 135–146)
WBC # BLD: 2.5 K/UL — LOW (ref 4.8–10.8)
WBC # FLD AUTO: 2.5 K/UL — LOW (ref 4.8–10.8)

## 2022-01-22 PROCEDURE — 99233 SBSQ HOSP IP/OBS HIGH 50: CPT

## 2022-01-22 RX ORDER — PANTOPRAZOLE SODIUM 20 MG/1
40 TABLET, DELAYED RELEASE ORAL
Refills: 0 | Status: DISCONTINUED | OUTPATIENT
Start: 2022-01-22 | End: 2022-01-24

## 2022-01-22 RX ADMIN — IRON SUCROSE 110 MILLIGRAM(S): 20 INJECTION, SOLUTION INTRAVENOUS at 05:15

## 2022-01-22 RX ADMIN — HEPARIN SODIUM 5000 UNIT(S): 5000 INJECTION INTRAVENOUS; SUBCUTANEOUS at 17:13

## 2022-01-22 RX ADMIN — HEPARIN SODIUM 5000 UNIT(S): 5000 INJECTION INTRAVENOUS; SUBCUTANEOUS at 05:16

## 2022-01-22 RX ADMIN — Medication 25 MILLIGRAM(S): at 17:13

## 2022-01-22 RX ADMIN — Medication 25 MILLIGRAM(S): at 05:16

## 2022-01-22 RX ADMIN — ONDANSETRON 4 MILLIGRAM(S): 8 TABLET, FILM COATED ORAL at 13:46

## 2022-01-22 NOTE — PROGRESS NOTE ADULT - ASSESSMENT
74 y F with PMH of severe aortic stenosis, HTN, morbidly obese, CKD, recurrent admission for iron infusions, blood transfusions,immuni  Immunofixation showed weak IgG lambda migrating para protein. Free light chain ratio 2.1. Normal calcium. EGD and VCE was done. EGD revealed gastritis and VCE showed bleeding in small bowel.   EGD, Colono, VCE reviewed : no signs of active bleeding , presence of gastritis and diverticulosis. Pt presented to the ED with an episode of dark stools.  She had one episode last night, no FBPR, OLEKSANDR done in the ED was negative, no stool in the rectal vault. pt reports shortness of breath, cough with sputum production.     #Covid PNA  #Right sided opactiy on CXR  - pt was diagnosed 2 weeks ago. did not receive any treatment.   - was on Doxycycline & rocephin, discontinued as low procal  - QTc 498 msec on ECG done on 1/11, fu repeat ECG  - off systemic steroids   - inflammatory markers: CRP 7, Procal 0.08, Dimer 397, Ferritin 29    #Dark stools   - pt has a hx of DARRIN, follows with Dr Gaston.   - OLEKSANDR was negative in the ED.  -CT Abdomen and Pelvis No Cont (01.17.22 @ 19:09) >Scattered prominent periaortic, periportal mesenteric lymph nodes. Finding is nonspecific and can represent infectious, inflammatory or neoplastic etiologies. Indeterminate left adrenal nodule. Consider nonemergent/outpatient follow-up with CT adrenal protocol as clinically indicated.  - active type and screen.   - pt has EGD and Colono in 2019 and 2020 showing gastritis, diverticulosis, grade 2 hemorrhoids.      #DARRIN  ( Baseline Hb 7-8)  #Pancytopenia  - Heme onc following, known pt of Dr. Gaston, recs appreciated  - pancytopenia could be related to COVID  -Repeat iron studies including serum Iron, TIBC, ferritin), reticulocyte count, LDH, haptoglobin, vit b12, folate (ordered for 1/18)  -Check myeloma panel (SPEP, UPEP, serum IF, FLC, Ig levels) (ordered for 1/18)  -Continue EPO  92862V weekly --> dose given 1/18    dvt ppx: Heparin SQ  GI prophylaxis: Not req  Code: Full code  Diet: Dash/ Tlc    #Progress Note Handoff  Pending (specify): MELINDA   Disposition: Possible  SNF

## 2022-01-22 NOTE — PROGRESS NOTE ADULT - SUBJECTIVE AND OBJECTIVE BOX
LATRICIA ARREAGA  76y  Female      Patient is a 76y old  Female who presents with a chief complaint of SOB    INTERVAL HPI/OVERNIGHT EVENTS:      ******************************* REVIEW OF SYSTEMS:**********************************************    All other review of systems negative    *********************** VITALS ******************************************    T(F): 97.5 (01-22-22 @ 05:00)  HR: 76 (01-22-22 @ 05:00) (70 - 76)  BP: 138/63 (01-22-22 @ 05:00) (138/63 - 150/69)  RR: 18 (01-22-22 @ 05:00) (18 - 19)  SpO2: 98% (01-22-22 @ 05:00) (95% - 98%)            ******************************** PHYSICAL EXAM:**************************************************  GENERAL: NAD    PSYCH: no agitation, baseline mentation  HEENT:     NERVOUS SYSTEM:  Alert & Oriented X3, PULMONARY: GARHAM, CTA    CARDIOVASCULAR: S1S2 RRR    GI: Soft, Mild EPIGASTRIC Tenderness , ND; BS present.    EXTREMITIES:  2+ Peripheral Pulses, No clubbing, cyanosis, or edema    LYMPH: No lymphadenopathy noted    SKIN: No rashes or lesions      **************************** LABS *******************************************************                          7.3    2.89  )-----------( 90       ( 21 Jan 2022 04:30 )             24.6     01-21    144  |  116<H>  |  33<H>  ----------------------------<  83  5.4<H>   |  16<L>  |  2.1<H>    Ca    8.2<L>      21 Jan 2022 04:30  Mg     2.2     01-21    TPro  6.3  /  Alb  3.5  /  TBili  0.6  /  DBili  x   /  AST  17  /  ALT  6   /  AlkPhos  75  01-21          Lactate Trend        CAPILLARY BLOOD GLUCOSE      POCT Blood Glucose.: 96 mg/dL (22 Jan 2022 11:33)          **************************Active Medications *******************************************  aspirin (Rash)  latex (Unknown)  penicillins (Unknown)      acetaminophen     Tablet .. 650 milliGRAM(s) Oral every 6 hours PRN  acetaminophen     Tablet .. 650 milliGRAM(s) Oral every 6 hours PRN  aluminum hydroxide/magnesium hydroxide/simethicone Suspension 30 milliLiter(s) Oral every 4 hours PRN  dextrose 40% Gel 15 Gram(s) Oral once  dextrose 5%. 1000 milliLiter(s) IV Continuous <Continuous>  dextrose 50% Injectable 25 Gram(s) IV Push once  glucagon  Injectable 1 milliGRAM(s) IntraMuscular once  heparin   Injectable 5000 Unit(s) SubCutaneous every 12 hours  insulin lispro (ADMELOG) corrective regimen sliding scale   SubCutaneous three times a day before meals  iron sucrose IVPB 200 milliGRAM(s) IV Intermittent every 48 hours  melatonin 3 milliGRAM(s) Oral at bedtime PRN  metoprolol tartrate 25 milliGRAM(s) Oral two times a day  ondansetron Injectable 4 milliGRAM(s) IV Push every 8 hours PRN  pantoprazole    Tablet 40 milliGRAM(s) Oral before breakfast      ***************************************************  RADIOLOGY & ADDITIONAL TESTS:    Imaging Personally Reviewed:  [ ] YES  [ ] NO    HEALTH ISSUES - PROBLEM Dx:

## 2022-01-22 NOTE — PROGRESS NOTE ADULT - SUBJECTIVE AND OBJECTIVE BOX
LATRICIA ARREAGA 76y Female  MRN#: [id number]   Hospital Day: 5d    HPI:  74 y F with PMH of severe aortic stenosis, HTN, morbidly obese, CKD, recurrent admission for iron infusions, blood transfusions,immuni  Immunofixation showed weak IgG lambda migrating para protein. Free light chain ratio 2.1. Normal calcium.   EGD and VCE was done. EGD revealed gastritis and VCE showed bleeding in small bowel.    presented to the ED with an episode of dark stools.  She had one episode last night , no FBPR, OLEKSANDR done in the ED was negative, no stool in the rectal vault.   pt reports shortness of breath, cough with sputum production.     in the ED,pt's VS T(C): 36.7 (01-17-22 @ 23:30), Max: 36.7 (01-17-22 @ 16:03)  HR: 78 (01-17-22 @ 23:30) (78 - 96)  BP: 150/72 (01-17-22 @ 23:30) (144/63 - 150/72)  RR: 18 (01-17-22 @ 23:30) (18 - 18)  SpO2: 99% (01-17-22 @ 23:30) (98% - 99%), labs done showed COVID-19 PCR: Detected, Hb: 8.1WBC Count: 2.96 Platelet Count: 99 Creatinine: 2.0  on PE, pt had bruising on left upper extremity, new per the patient.   CXR showed right opacities .  CT Abdomen and Pelvis No Cont (01.17.22 @ 19:09) >  Scattered prominent periaortic, periportal mesenteric lymph nodes.   Finding is nonspecific and can represent infectious, inflammatory or   neoplastic etiologies.  Indeterminate left adrenal nodule. Consider nonemergent/outpatient   follow-up with CT adrenal protocol as clinically indicated.    pt received Famotidine, insulin for hyperkalemia, NS 1 L. on room air.   pt is admitted for workup/management (17 Jan 2022 23:50)      SUBJECTIVE  Patient is a 76y old Female who presents with a chief complaint of Currently admitted to medicine with the primary diagnosis of Melena      INTERVAL HPI AND OVERNIGHT EVENTS:  Patient was examined and seen at bedside. This morning she is resting comfortably in bed and reports no issues or overnight events.    REVIEW OF SYMPTOMS:  CONSTITUTIONAL: No weakness, fevers or chills; No headaches  EYES: No visual changes, eye pain, or discharge  ENT: No vertigo; No ear pain or change in hearing; No sore throat or difficulty swallowing  NECK: No pain or stiffness  RESPIRATORY: No cough, wheezing, or hemoptysis; No shortness of breath  CARDIOVASCULAR: No chest pain or palpitations  GASTROINTESTINAL: No abdominal or epigastric pain; No nausea, vomiting, or hematemesis; No diarrhea or constipation; No melena or hematochezia  GENITOURINARY: No dysuria, frequency or hematuria  MUSCULOSKELETAL: No joint pain, no muscle pain, no weakness  NEUROLOGICAL: No numbness or weakness  SKIN: No itching or rashes    OBJECTIVE  PAST MEDICAL & SURGICAL HISTORY  HTN (hypertension)    Heart murmur    Eczema    Anemia    H/O appendicitis    H/O cholecystitis      ALLERGIES:  aspirin (Rash)  latex (Unknown)  penicillins (Unknown)    MEDICATIONS:  STANDING MEDICATIONS  dextrose 40% Gel 15 Gram(s) Oral once  dextrose 5%. 1000 milliLiter(s) IV Continuous <Continuous>  dextrose 50% Injectable 25 Gram(s) IV Push once  glucagon  Injectable 1 milliGRAM(s) IntraMuscular once  heparin   Injectable 5000 Unit(s) SubCutaneous every 12 hours  insulin lispro (ADMELOG) corrective regimen sliding scale   SubCutaneous three times a day before meals  iron sucrose IVPB 200 milliGRAM(s) IV Intermittent every 48 hours  metoprolol tartrate 25 milliGRAM(s) Oral two times a day    PRN MEDICATIONS  acetaminophen     Tablet .. 650 milliGRAM(s) Oral every 6 hours PRN  acetaminophen     Tablet .. 650 milliGRAM(s) Oral every 6 hours PRN  aluminum hydroxide/magnesium hydroxide/simethicone Suspension 30 milliLiter(s) Oral every 4 hours PRN  melatonin 3 milliGRAM(s) Oral at bedtime PRN  ondansetron Injectable 4 milliGRAM(s) IV Push every 8 hours PRN      VITAL SIGNS: Last 24 Hours  T(C): 36.4 (22 Jan 2022 05:00), Max: 36.4 (22 Jan 2022 05:00)  T(F): 97.5 (22 Jan 2022 05:00), Max: 97.5 (22 Jan 2022 05:00)  HR: 76 (22 Jan 2022 05:00) (70 - 76)  BP: 138/63 (22 Jan 2022 05:00) (138/63 - 150/69)  BP(mean): 95 (21 Jan 2022 17:18) (95 - 95)  RR: 18 (22 Jan 2022 05:00) (18 - 19)  SpO2: 98% (22 Jan 2022 05:00) (95% - 98%)    LABS:                        7.3    2.89  )-----------( 90       ( 21 Jan 2022 04:30 )             24.6     01-21    144  |  116<H>  |  33<H>  ----------------------------<  83  5.4<H>   |  16<L>  |  2.1<H>    Ca    8.2<L>      21 Jan 2022 04:30  Mg     2.2     01-21    TPro  6.3  /  Alb  3.5  /  TBili  0.6  /  DBili  x   /  AST  17  /  ALT  6   /  AlkPhos  75  01-21                  RADIOLOGY:      PHYSICAL EXAM:  CONSTITUTIONAL: No acute distress, well-developed, well-groomed, AAOx3  HEAD: Atraumatic, normocephalic  EYES: EOM intact, PERRLA, conjunctiva and sclera clear  ENT: Supple, no masses, no thyromegaly, no bruits, no JVD; moist mucous membranes  PULMONARY: Clear to auscultation bilaterally; no wheezes, rales, or rhonchi  CARDIOVASCULAR: Regular rate and rhythm; no murmurs, rubs, or gallops  GASTROINTESTINAL: Soft, non-tender, non-distended; bowel sounds present  MUSCULOSKELETAL: 2+ peripheral pulses; no clubbing, no cyanosis, no edema  NEUROLOGY: non-focal  SKIN: No rashes or lesions; warm and dry    ASSESSMENT & PLAN  #    PAST MEDICAL & SURGICAL HISTORY:  HTN (hypertension)    Heart murmur    Eczema    Anemia    H/O appendicitis    H/O cholecystitis        #Misc  - DVT Prophylaxis:  - Diet:  - GI Prophylaxis:  - Activity:  - IV Fluids:  - Code Status:    Dispo: LATRICIA ARREAGA 76y Female  MRN#: [id number]   Hospital Day: 5d    HPI:  74 y F with PMH of severe aortic stenosis, HTN, morbidly obese, CKD, recurrent admission for iron infusions, blood transfusions,immuni  Immunofixation showed weak IgG lambda migrating para protein. Free light chain ratio 2.1. Normal calcium.   EGD and VCE was done. EGD revealed gastritis and VCE showed bleeding in small bowel.    presented to the ED with an episode of dark stools.  She had one episode last night , no FBPR, OLEKSANDR done in the ED was negative, no stool in the rectal vault.   pt reports shortness of breath, cough with sputum production.     in the ED,pt's VS T(C): 36.7 (01-17-22 @ 23:30), Max: 36.7 (01-17-22 @ 16:03)  HR: 78 (01-17-22 @ 23:30) (78 - 96)  BP: 150/72 (01-17-22 @ 23:30) (144/63 - 150/72)  RR: 18 (01-17-22 @ 23:30) (18 - 18)  SpO2: 99% (01-17-22 @ 23:30) (98% - 99%), labs done showed COVID-19 PCR: Detected, Hb: 8.1WBC Count: 2.96 Platelet Count: 99 Creatinine: 2.0  on PE, pt had bruising on left upper extremity, new per the patient.   CXR showed right opacities .  CT Abdomen and Pelvis No Cont (01.17.22 @ 19:09) >  Scattered prominent periaortic, periportal mesenteric lymph nodes.   Finding is nonspecific and can represent infectious, inflammatory or   neoplastic etiologies.  Indeterminate left adrenal nodule. Consider nonemergent/outpatient   follow-up with CT adrenal protocol as clinically indicated.    pt received Famotidine, insulin for hyperkalemia, NS 1 L. on room air.   pt is admitted for workup/management (17 Jan 2022 23:50)      SUBJECTIVE  Patient is a 76y old Female who presents with a chief complaint of Currently admitted to medicine with the primary diagnosis of Melena      INTERVAL HPI AND OVERNIGHT EVENTS:  Patient was examined and seen at bedside. This morning she is resting comfortably in bed and reports no issues or overnight events.    REVIEW OF SYMPTOMS:  `  OBJECTIVE  PAST MEDICAL & SURGICAL HISTORY  HTN (hypertension)    Heart murmur    Eczema    Anemia    H/O appendicitis    H/O cholecystitis      ALLERGIES:  aspirin (Rash)  latex (Unknown)  penicillins (Unknown)    MEDICATIONS:  STANDING MEDICATIONS  dextrose 40% Gel 15 Gram(s) Oral once  dextrose 5%. 1000 milliLiter(s) IV Continuous <Continuous>  dextrose 50% Injectable 25 Gram(s) IV Push once  glucagon  Injectable 1 milliGRAM(s) IntraMuscular once  heparin   Injectable 5000 Unit(s) SubCutaneous every 12 hours  insulin lispro (ADMELOG) corrective regimen sliding scale   SubCutaneous three times a day before meals  iron sucrose IVPB 200 milliGRAM(s) IV Intermittent every 48 hours  metoprolol tartrate 25 milliGRAM(s) Oral two times a day    PRN MEDICATIONS  acetaminophen     Tablet .. 650 milliGRAM(s) Oral every 6 hours PRN  acetaminophen     Tablet .. 650 milliGRAM(s) Oral every 6 hours PRN  aluminum hydroxide/magnesium hydroxide/simethicone Suspension 30 milliLiter(s) Oral every 4 hours PRN  melatonin 3 milliGRAM(s) Oral at bedtime PRN  ondansetron Injectable 4 milliGRAM(s) IV Push every 8 hours PRN      VITAL SIGNS: Last 24 Hours  T(C): 36.4 (22 Jan 2022 05:00), Max: 36.4 (22 Jan 2022 05:00)  T(F): 97.5 (22 Jan 2022 05:00), Max: 97.5 (22 Jan 2022 05:00)  HR: 76 (22 Jan 2022 05:00) (70 - 76)  BP: 138/63 (22 Jan 2022 05:00) (138/63 - 150/69)  BP(mean): 95 (21 Jan 2022 17:18) (95 - 95)  RR: 18 (22 Jan 2022 05:00) (18 - 19)  SpO2: 98% (22 Jan 2022 05:00) (95% - 98%)    LABS:                        7.3    2.89  )-----------( 90       ( 21 Jan 2022 04:30 )             24.6     01-21    144  |  116<H>  |  33<H>  ----------------------------<  83  5.4<H>   |  16<L>  |  2.1<H>    Ca    8.2<L>      21 Jan 2022 04:30  Mg     2.2     01-21    TPro  6.3  /  Alb  3.5  /  TBili  0.6  /  DBili  x   /  AST  17  /  ALT  6   /  AlkPhos  75  01-21    RADIOLOGY:      PHYSICAL EXAM:  CONSTITUTIONAL: No acute distress, well-developed, well-groomed, AAOx3  HEAD: Atraumatic, normocephalic  ENT: Supple  PULMONARY: Clear to auscultation bilaterally  CARDIOVASCULAR: Regular rate and rhythm; no murmurs, rubs, or gallops  GASTROINTESTINAL: Soft, non-tender, non-distended; bowel sounds present  MUSCULOSKELETAL: 2+ peripheral pulses; no clubbing, no cyanosis, trace edema

## 2022-01-22 NOTE — PROGRESS NOTE ADULT - ASSESSMENT
74 y F with PMH of severe aortic stenosis, HTN, morbidly obese, CKD 4, Rheumatic heart disease recurrent admission for iron infusions, blood transfusions,immuni  Immunofixation showed weak IgG lambda migrating para protein. Free light chain ratio 2.1. Normal calcium. EGD and VCE was done. EGD revealed gastritis and VCE showed bleeding in small bowel.   EGD, Colono, VCE reviewed : no signs of active bleeding , presence of gastritis and diverticulosis. Pt presented to the ED with an episode of dark stools.  She had one episode last night, no FBPR, OLEKSANDR done in the ED was negative, no stool in the rectal vault. pt reports shortness of breath, cough with sputum production.     #DARRIN  #Pancytopenia  - Heme onc following, known pt of Dr. Gaston, recs appreciated, out pt fup per Heme/Onc  - pancytopenia could be related to COVID  -Repeat iron studies including serum Iron, TIBC, ferritin), reticulocyte count, , haptoglobin 103, vit b12 284, folate 6.9  -Check myeloma panel (SPEP 6, UPEP pending, serum IF pending  - Ig levels: IgG 1300, IgA 222, IgM 148.  -Continue Retacrit 18867M weekly --> dose given 1/18, follows Dr. Gaston outpt weekly  -Pharmacologic DVT ppx if plt >30k , starting on heparin PPX   -Venofer today 01/20 next one sara 01/22    #Dark stools per Pt  #Low Hg likely due to DARRIN  - pt has a hx of DARRIN, follows with Dr Gaston.   - hb 8.1, plt 99, WBC 2 K  - OLEKSANDR was negative in the ED.  - Pt has had no episodes since admission  -CT Abdomen and Pelvis No Cont (01.17.22 @ 19:09) >Scattered prominent periaortic, periportal mesenteric lymph nodes. Finding is nonspecific and can represent infectious, inflammatory or neoplastic etiologies. Indeterminate left adrenal nodule. Consider nonemergent/outpatient follow-up with CT adrenal protocol as clinically indicated.  - active type and screen.  - possibly due to viral cause, ro MM. pt has a hx of Gamma spike protein.   - if pt has recurrent bleeding, consider GI consult. pt has EGD and Colono in 2019 and 2020 showing gastritis, diverticulosis, grade 2 hemorrhoids. no signs of active bleeding on VCE.  - consider repeat EGD/colono if Hg continues to drop  - GI consulted for Hg continuing to drop.    #Dizziness  - Pt feels dizzy when she ambulates  - vitals are stable  - could be due to low Hg  - Orthostatics    #Covid positive:  #PNA  #Right sided opactiy on CXR  #Mild Chest tightness  - pt was diagnosed 2 weeks ago. did not receive any treatment.   - not on oxygen in the ED.   - was on Doxycycline & rocephin, discontinued as low procal  - QTc 498 msec on ECG done on 1/11, fu repeat ECG  - defer systemic steroids for now.   - monitor VS.   - inflammatory markers: CRP 7, Procal 0.08, Dimer 397, Ferritin 29  - mild chest pain on 01/20/2022: EKG showed Bifascicular block, similar to previous ones going back to 2019    #CKD 4 - stable  - trend Cr, so far stable    #Hyperkalemia  - H/o of Hyperkalemia going back to 2017  - likely due to ckd  - Lokelma 10mg daily based on K+ levels  - monitor CMP    dvt ppx: Heparin SQ  GI prophylaxis: Not req  Code: Full code  Diet: Dash/ Tlc  Dispo: Pending placement decision, might be SNF with     74 y F with PMH of severe aortic stenosis, HTN, morbidly obese, CKD 4, Rheumatic heart disease recurrent admission for iron infusions, blood transfusions,immuni  Immunofixation showed weak IgG lambda migrating para protein. Free light chain ratio 2.1. Normal calcium. EGD and VCE was done. EGD revealed gastritis and VCE showed bleeding in small bowel.   EGD, Colono, VCE reviewed : no signs of active bleeding , presence of gastritis and diverticulosis. Pt presented to the ED with an episode of dark stools.  She had one episode last night, no FBPR, OLEKSANDR done in the ED was negative, no stool in the rectal vault. pt reports shortness of breath, cough with sputum production.     #DARRIN  #Pancytopenia  - Heme onc following, known pt of Dr. Gaston, recs appreciated, out pt fup per Heme/Onc  - pancytopenia could be related to COVID  -Repeat iron studies including serum Iron, TIBC, ferritin), reticulocyte count, , haptoglobin 103, vit b12 284, folate 6.9  -Check myeloma panel (SPEP 6, UPEP pending, serum IF pending  - Ig levels: IgG 1300, IgA 222, IgM 148.  -Continue Retacrit 82169D weekly --> dose given 1/18, follows Dr. Gaston outpt weekly  -Pharmacologic DVT ppx if plt >30k , starting on heparin PPX   -Venofer today 01/20 next one sara 01/22    #Dark stools per Pt  #Low Hg likely due to DARRIN  - pt has a hx of DARRIN, follows with Dr Gaston.   - hb 8.1, plt 99, WBC 2 K  - OLEKSANDR was negative in the ED.  - Pt has had no episodes since admission  -CT Abdomen and Pelvis No Cont (01.17.22 @ 19:09) >Scattered prominent periaortic, periportal mesenteric lymph nodes. Finding is nonspecific and can represent infectious, inflammatory or neoplastic etiologies. Indeterminate left adrenal nodule. Consider nonemergent/outpatient follow-up with CT adrenal protocol as clinically indicated.  - active type and screen.  - possibly due to viral cause, ro MM. pt has a hx of Gamma spike protein.   - if pt has recurrent bleeding, consider GI consult. pt has EGD and Colono in 2019 and 2020 showing gastritis, diverticulosis, grade 2 hemorrhoids. no signs of active bleeding on VCE.  - consider repeat EGD/colono if Hg continues to drop  - GI consulted for Hg continuing to drop.    #Dizziness  - Pt feels dizzy when she ambulates  - vitals are stable  - could be due to low Hg  - Orthostatics    #Covid positive:  #PNA  #Right sided opactiy on CXR  #Mild Chest tightness  - pt was diagnosed 2 weeks ago. did not receive any treatment.   - not on oxygen in the ED.   - was on Doxycycline & rocephin, discontinued as low procal  - QTc 498 msec on ECG done on 1/11, fu repeat ECG  - defer systemic steroids for now.   - monitor VS.   - inflammatory markers: CRP 7, Procal 0.08, Dimer 397, Ferritin 29  - mild chest pain on 01/20/2022: EKG showed Bifascicular block, similar to previous ones going back to 2019    #CKD 4 - stable  - trend Cr, so far stable    #Hyperkalemia  - H/o of Hyperkalemia going back to 2017  - likely due to ckd  - Lokelma 10mg daily based on K+ levels  - monitor CMP    dvt ppx: Heparin SQ  GI prophylaxis: Not req  Code: Full code  Diet: Dash/ Tlc  Dispo: Pending placement decision, might be SNF with     74 y F with PMH of severe aortic stenosis, HTN, morbidly obese, CKD 4, Rheumatic heart disease recurrent admission for iron infusions, blood transfusions,immuni  Immunofixation showed weak IgG lambda migrating para protein. Free light chain ratio 2.1. Normal calcium. EGD and VCE was done. EGD revealed gastritis and VCE showed bleeding in small bowel.   EGD, Colono, VCE reviewed : no signs of active bleeding , presence of gastritis and diverticulosis. Pt presented to the ED with an episode of dark stools.  She had one episode last night, no FBPR, OLEKSANDR done in the ED was negative, no stool in the rectal vault. pt reports shortness of breath, cough with sputum production.     #DARRIN  #Pancytopenia  - Heme onc following, known pt of Dr. Gaston, recs appreciated, out pt fup per Heme/Onc  - pancytopenia could be related to COVID  -Repeat iron studies including serum Iron, TIBC, ferritin), reticulocyte count, , haptoglobin 103, vit b12 284, folate 6.9  -Check myeloma panel (SPEP 6, UPEP pending, serum IF pending  - Ig levels: IgG 1300, IgA 222, IgM 148.  -Continue Retacrit 18194W weekly --> dose given 1/18, follows Dr. Gaston outpt weekly  -Pharmacologic DVT ppx if plt >30k , starting on heparin PPX   -Venofer today 01/20 next one sara 01/22  - Pt can get outpt Retacrit per Heme/ Onc at [facility] as will complete 14 days quarantine  - pending Discharge planning to NH vs Home    #Dark stools per Pt  #Low Hg likely due to DARRIN  #Mild epigastric pain  - pt has a hx of DARRIN, follows with Dr Gaston.   - hb 8.1, plt 99, WBC 2 K  - OLEKSANDR was negative in the ED.  - Pt has had no episodes since admission  -CT Abdomen and Pelvis No Cont (01.17.22 @ 19:09) >Scattered prominent periaortic, periportal mesenteric lymph nodes. Finding is nonspecific and can represent infectious, inflammatory or neoplastic etiologies. Indeterminate left adrenal nodule. Consider nonemergent/outpatient follow-up with CT adrenal protocol as clinically indicated.  - active type and screen.  - possibly due to viral cause, ro MM. pt has a hx of Gamma spike protein.   - if pt has recurrent bleeding, consider GI consult. pt has EGD and Colono in 2019 and 2020 showing gastritis, diverticulosis, grade 2 hemorrhoids. no signs of active bleeding on VCE.  - consider repeat EGD/colono if Hg continues to drop    #Dizziness  - Pt feels dizzy when she ambulates  - vitals are stable  - could be due to low Hg  - Orthostatics neg    #Covid positive:  #PNA  #Right sided opactiy on CXR  #Mild Chest tightness  - pt was diagnosed 2 weeks ago. did not receive any treatment.   - not on oxygen in the ED.   - was on Doxycycline & rocephin, discontinued as low procal  - QTc 498 msec on ECG done on 1/11, fu repeat ECG  - defer systemic steroids for now.   - monitor VS.   - inflammatory markers: CRP 7, Procal 0.08, Dimer 397, Ferritin 29  - mild chest pain on 01/20/2022: EKG showed Bifascicular block, similar to previous ones going back to 2019    #CKD 4 - stable  - trend Cr, so far stable    #Hyperkalemia  - H/o of Hyperkalemia going back to 2017  - likely due to ckd  - Lokelma 10mg daily based on K+ levels  - monitor CMP    dvt ppx: Heparin SQ  GI prophylaxis: Not req  Code: Full code  Diet: Dash/ Tlc  Dispo: Pending placement decision, might be SNF with

## 2022-01-23 LAB
ALBUMIN SERPL ELPH-MCNC: 3.6 G/DL — SIGNIFICANT CHANGE UP (ref 3.5–5.2)
ALP SERPL-CCNC: 77 U/L — SIGNIFICANT CHANGE UP (ref 30–115)
ALT FLD-CCNC: 6 U/L — SIGNIFICANT CHANGE UP (ref 0–41)
ANION GAP SERPL CALC-SCNC: 12 MMOL/L — SIGNIFICANT CHANGE UP (ref 7–14)
AST SERPL-CCNC: 18 U/L — SIGNIFICANT CHANGE UP (ref 0–41)
BASOPHILS # BLD AUTO: 0.02 K/UL — SIGNIFICANT CHANGE UP (ref 0–0.2)
BASOPHILS NFR BLD AUTO: 0.6 % — SIGNIFICANT CHANGE UP (ref 0–1)
BILIRUB SERPL-MCNC: 0.7 MG/DL — SIGNIFICANT CHANGE UP (ref 0.2–1.2)
BUN SERPL-MCNC: 39 MG/DL — HIGH (ref 10–20)
CALCIUM SERPL-MCNC: 8.5 MG/DL — SIGNIFICANT CHANGE UP (ref 8.5–10.1)
CHLORIDE SERPL-SCNC: 112 MMOL/L — HIGH (ref 98–110)
CO2 SERPL-SCNC: 17 MMOL/L — SIGNIFICANT CHANGE UP (ref 17–32)
CREAT SERPL-MCNC: 1.9 MG/DL — HIGH (ref 0.7–1.5)
EOSINOPHIL # BLD AUTO: 0.21 K/UL — SIGNIFICANT CHANGE UP (ref 0–0.7)
EOSINOPHIL NFR BLD AUTO: 6.5 % — SIGNIFICANT CHANGE UP (ref 0–8)
GLUCOSE BLDC GLUCOMTR-MCNC: 101 MG/DL — HIGH (ref 70–99)
GLUCOSE BLDC GLUCOMTR-MCNC: 79 MG/DL — SIGNIFICANT CHANGE UP (ref 70–99)
GLUCOSE BLDC GLUCOMTR-MCNC: 81 MG/DL — SIGNIFICANT CHANGE UP (ref 70–99)
GLUCOSE BLDC GLUCOMTR-MCNC: 99 MG/DL — SIGNIFICANT CHANGE UP (ref 70–99)
GLUCOSE SERPL-MCNC: 76 MG/DL — SIGNIFICANT CHANGE UP (ref 70–99)
HCT VFR BLD CALC: 26 % — LOW (ref 37–47)
HGB BLD-MCNC: 7.6 G/DL — LOW (ref 12–16)
IMM GRANULOCYTES NFR BLD AUTO: 0.3 % — SIGNIFICANT CHANGE UP (ref 0.1–0.3)
LYMPHOCYTES # BLD AUTO: 0.77 K/UL — LOW (ref 1.2–3.4)
LYMPHOCYTES # BLD AUTO: 23.7 % — SIGNIFICANT CHANGE UP (ref 20.5–51.1)
MAGNESIUM SERPL-MCNC: 2.2 MG/DL — SIGNIFICANT CHANGE UP (ref 1.8–2.4)
MCHC RBC-ENTMCNC: 27 PG — SIGNIFICANT CHANGE UP (ref 27–31)
MCHC RBC-ENTMCNC: 29.2 G/DL — LOW (ref 32–37)
MCV RBC AUTO: 92.5 FL — SIGNIFICANT CHANGE UP (ref 81–99)
MONOCYTES # BLD AUTO: 0.54 K/UL — SIGNIFICANT CHANGE UP (ref 0.1–0.6)
MONOCYTES NFR BLD AUTO: 16.6 % — HIGH (ref 1.7–9.3)
NEUTROPHILS # BLD AUTO: 1.7 K/UL — SIGNIFICANT CHANGE UP (ref 1.4–6.5)
NEUTROPHILS NFR BLD AUTO: 52.3 % — SIGNIFICANT CHANGE UP (ref 42.2–75.2)
NRBC # BLD: 0 /100 WBCS — SIGNIFICANT CHANGE UP (ref 0–0)
PLATELET # BLD AUTO: 117 K/UL — LOW (ref 130–400)
POTASSIUM SERPL-MCNC: 5.4 MMOL/L — HIGH (ref 3.5–5)
POTASSIUM SERPL-SCNC: 5.4 MMOL/L — HIGH (ref 3.5–5)
PROT SERPL-MCNC: 6.2 G/DL — SIGNIFICANT CHANGE UP (ref 6–8)
RBC # BLD: 2.81 M/UL — LOW (ref 4.2–5.4)
RBC # FLD: 17.6 % — HIGH (ref 11.5–14.5)
SODIUM SERPL-SCNC: 141 MMOL/L — SIGNIFICANT CHANGE UP (ref 135–146)
WBC # BLD: 3.25 K/UL — LOW (ref 4.8–10.8)
WBC # FLD AUTO: 3.25 K/UL — LOW (ref 4.8–10.8)

## 2022-01-23 PROCEDURE — 99232 SBSQ HOSP IP/OBS MODERATE 35: CPT

## 2022-01-23 PROCEDURE — 93010 ELECTROCARDIOGRAM REPORT: CPT

## 2022-01-23 RX ADMIN — Medication 25 MILLIGRAM(S): at 05:24

## 2022-01-23 RX ADMIN — HEPARIN SODIUM 5000 UNIT(S): 5000 INJECTION INTRAVENOUS; SUBCUTANEOUS at 05:25

## 2022-01-23 RX ADMIN — Medication 25 MILLIGRAM(S): at 17:25

## 2022-01-23 RX ADMIN — PANTOPRAZOLE SODIUM 40 MILLIGRAM(S): 20 TABLET, DELAYED RELEASE ORAL at 05:24

## 2022-01-23 NOTE — PROGRESS NOTE ADULT - SUBJECTIVE AND OBJECTIVE BOX
LATRICIA ARREAGA  76y  Female      Patient is a 76y old  Female who presents with a chief complaint of sob.     INTERVAL HPI/OVERNIGHT EVENTS:      ******************************* REVIEW OF SYSTEMS:**********************************************    All other review of systems negative    *********************** VITALS ******************************************    T(F): 97.5 (01-23-22 @ 05:48)  HR: 76 (01-23-22 @ 05:48) (67 - 84)  BP: 136/62 (01-23-22 @ 05:48) (133/58 - 136/62)  RR: 19 (01-23-22 @ 05:48) (18 - 19)  SpO2: 96% (01-23-22 @ 05:48) (96% - 98%)            ******************************** PHYSICAL EXAM:**************************************************  GENERAL: NAD    PSYCH: no agitation, baseline mentation  HEENT:     NERVOUS SYSTEM:  Alert & Oriented X3,   PULMONARY: GRAHAM, CTA    CARDIOVASCULAR: S1S2 RRR    GI: Soft, NT, ND; BS present.    EXTREMITIES:  2+ Peripheral Pulses, No clubbing, cyanosis, or edema    LYMPH: No lymphadenopathy noted    SKIN: No rashes or lesions      **************************** LABS *******************************************************                          7.6    3.25  )-----------( 117      ( 23 Jan 2022 04:30 )             26.0     01-23    141  |  112<H>  |  39<H>  ----------------------------<  76  5.4<H>   |  17  |  1.9<H>    Ca    8.5      23 Jan 2022 04:30  Mg     2.2     01-23    TPro  6.2  /  Alb  3.6  /  TBili  0.7  /  DBili  x   /  AST  18  /  ALT  6   /  AlkPhos  77  01-23          Lactate Trend        CAPILLARY BLOOD GLUCOSE      POCT Blood Glucose.: 79 mg/dL (23 Jan 2022 11:31)          **************************Active Medications *******************************************  aspirin (Rash)  latex (Unknown)  penicillins (Unknown)      acetaminophen     Tablet .. 650 milliGRAM(s) Oral every 6 hours PRN  acetaminophen     Tablet .. 650 milliGRAM(s) Oral every 6 hours PRN  aluminum hydroxide/magnesium hydroxide/simethicone Suspension 30 milliLiter(s) Oral every 4 hours PRN  dextrose 40% Gel 15 Gram(s) Oral once  dextrose 5%. 1000 milliLiter(s) IV Continuous <Continuous>  dextrose 50% Injectable 25 Gram(s) IV Push once  glucagon  Injectable 1 milliGRAM(s) IntraMuscular once  heparin   Injectable 5000 Unit(s) SubCutaneous every 12 hours  insulin lispro (ADMELOG) corrective regimen sliding scale   SubCutaneous three times a day before meals  iron sucrose IVPB 200 milliGRAM(s) IV Intermittent every 48 hours  melatonin 3 milliGRAM(s) Oral at bedtime PRN  metoprolol tartrate 25 milliGRAM(s) Oral two times a day  ondansetron Injectable 4 milliGRAM(s) IV Push every 8 hours PRN  pantoprazole    Tablet 40 milliGRAM(s) Oral before breakfast      ***************************************************  RADIOLOGY & ADDITIONAL TESTS:    Imaging Personally Reviewed:  [ ] YES  [ ] NO    HEALTH ISSUES - PROBLEM Dx:

## 2022-01-23 NOTE — PROGRESS NOTE ADULT - ASSESSMENT
74 y F with PMH of severe aortic stenosis, HTN, morbidly obese, CKD, recurrent admission for iron infusions, blood transfusions,immuni  Immunofixation showed weak IgG lambda migrating para protein. Free light chain ratio 2.1. Normal calcium. EGD and VCE was done. EGD revealed gastritis and VCE showed bleeding in small bowel.   EGD, Colono, VCE reviewed : no signs of active bleeding , presence of gastritis and diverticulosis. Pt presented to the ED with an episode of dark stools.  She had one episode last night, no FBPR, OLEKSANDR done in the ED was negative, no stool in the rectal vault. pt reports shortness of breath, cough with sputum production.     #Covid PNA  #Right sided opactiy on CXR  - pt was diagnosed 2 weeks ago. did not receive any treatment.   - was on Doxycycline & rocephin, discontinued as low procal  - QTc 498 msec on ECG done on 1/11, fu repeat ECG  - off systemic steroids   - inflammatory markers: CRP 7, Procal 0.08, Dimer 397, Ferritin 29    #Dark stools   - pt has a hx of DARRIN, follows with Dr Gaston.   - OLEKSANDR was negative in the ED.  -CT Abdomen and Pelvis No Cont (01.17.22 @ 19:09) >Scattered prominent periaortic, periportal mesenteric lymph nodes. Finding is nonspecific and can represent infectious, inflammatory or neoplastic etiologies. Indeterminate left adrenal nodule. Consider nonemergent/outpatient follow-up with CT adrenal protocol as clinically indicated.  - active type and screen.   - pt has EGD and Colono in 2019 and 2020 showing gastritis, diverticulosis, grade 2 hemorrhoids.      #DARRIN  ( Baseline Hb 7-8)  #Pancytopenia  - Heme onc following, known pt of Dr. Gaston, recs appreciated  - pancytopenia could be related to COVID  -Repeat iron studies including serum Iron, TIBC, ferritin), reticulocyte count, LDH, haptoglobin, vit b12, folate (ordered for 1/18)  -Check myeloma panel (SPEP, UPEP, serum IF, FLC, Ig levels) (ordered for 1/18)  -Continue EPO  90802X weekly --> dose given 1/18    dvt ppx: Heparin SQ  GI prophylaxis: Not req  Code: Full code  Diet: Dash/ Tlc    #Progress Note Handoff  Pending (specify): MELINDA   Disposition:   SNF

## 2022-01-24 ENCOUNTER — TRANSCRIPTION ENCOUNTER (OUTPATIENT)
Age: 77
End: 2022-01-24

## 2022-01-24 VITALS
TEMPERATURE: 97 F | DIASTOLIC BLOOD PRESSURE: 73 MMHG | HEART RATE: 70 BPM | RESPIRATION RATE: 18 BRPM | SYSTOLIC BLOOD PRESSURE: 128 MMHG

## 2022-01-24 LAB
% ALBUMIN: 51.3 % — SIGNIFICANT CHANGE UP
% ALPHA 1: 6.5 % — SIGNIFICANT CHANGE UP
% ALPHA 2: 9.7 % — SIGNIFICANT CHANGE UP
% BETA: 12.1 % — SIGNIFICANT CHANGE UP
% GAMMA: 20.4 % — SIGNIFICANT CHANGE UP
% M SPIKE: SIGNIFICANT CHANGE UP
ALBUMIN SERPL ELPH-MCNC: 3.1 G/DL — LOW (ref 3.6–5.5)
ALBUMIN SERPL ELPH-MCNC: 3.6 G/DL — SIGNIFICANT CHANGE UP (ref 3.5–5.2)
ALBUMIN/GLOB SERPL ELPH: 1.1 RATIO — SIGNIFICANT CHANGE UP
ALP SERPL-CCNC: 77 U/L — SIGNIFICANT CHANGE UP (ref 30–115)
ALPHA1 GLOB SERPL ELPH-MCNC: 0.4 G/DL — SIGNIFICANT CHANGE UP (ref 0.1–0.4)
ALPHA2 GLOB SERPL ELPH-MCNC: 0.6 G/DL — SIGNIFICANT CHANGE UP (ref 0.5–1)
ALT FLD-CCNC: 7 U/L — SIGNIFICANT CHANGE UP (ref 0–41)
ANION GAP SERPL CALC-SCNC: 13 MMOL/L — SIGNIFICANT CHANGE UP (ref 7–14)
AST SERPL-CCNC: 20 U/L — SIGNIFICANT CHANGE UP (ref 0–41)
B-GLOBULIN SERPL ELPH-MCNC: 0.7 G/DL — SIGNIFICANT CHANGE UP (ref 0.5–1)
BASOPHILS # BLD AUTO: 0.01 K/UL — SIGNIFICANT CHANGE UP (ref 0–0.2)
BASOPHILS NFR BLD AUTO: 0.3 % — SIGNIFICANT CHANGE UP (ref 0–1)
BILIRUB SERPL-MCNC: 0.5 MG/DL — SIGNIFICANT CHANGE UP (ref 0.2–1.2)
BUN SERPL-MCNC: 46 MG/DL — HIGH (ref 10–20)
CALCIUM SERPL-MCNC: 8.1 MG/DL — LOW (ref 8.5–10.1)
CHLORIDE SERPL-SCNC: 117 MMOL/L — HIGH (ref 98–110)
CO2 SERPL-SCNC: 15 MMOL/L — LOW (ref 17–32)
CREAT SERPL-MCNC: 2.2 MG/DL — HIGH (ref 0.7–1.5)
EOSINOPHIL # BLD AUTO: 0.2 K/UL — SIGNIFICANT CHANGE UP (ref 0–0.7)
EOSINOPHIL NFR BLD AUTO: 6.6 % — SIGNIFICANT CHANGE UP (ref 0–8)
GAMMA GLOBULIN: 1.2 G/DL — SIGNIFICANT CHANGE UP (ref 0.6–1.6)
GLUCOSE BLDC GLUCOMTR-MCNC: 123 MG/DL — HIGH (ref 70–99)
GLUCOSE BLDC GLUCOMTR-MCNC: 85 MG/DL — SIGNIFICANT CHANGE UP (ref 70–99)
GLUCOSE BLDC GLUCOMTR-MCNC: 89 MG/DL — SIGNIFICANT CHANGE UP (ref 70–99)
GLUCOSE SERPL-MCNC: 78 MG/DL — SIGNIFICANT CHANGE UP (ref 70–99)
HCT VFR BLD CALC: 25.1 % — LOW (ref 37–47)
HGB BLD-MCNC: 7.3 G/DL — LOW (ref 12–16)
IMM GRANULOCYTES NFR BLD AUTO: 0.3 % — SIGNIFICANT CHANGE UP (ref 0.1–0.3)
INTERPRETATION SERPL IFE-IMP: SIGNIFICANT CHANGE UP
LYMPHOCYTES # BLD AUTO: 0.77 K/UL — LOW (ref 1.2–3.4)
LYMPHOCYTES # BLD AUTO: 25.6 % — SIGNIFICANT CHANGE UP (ref 20.5–51.1)
M-SPIKE: SIGNIFICANT CHANGE UP (ref 0–0)
MAGNESIUM SERPL-MCNC: 2.4 MG/DL — SIGNIFICANT CHANGE UP (ref 1.8–2.4)
MCHC RBC-ENTMCNC: 27.3 PG — SIGNIFICANT CHANGE UP (ref 27–31)
MCHC RBC-ENTMCNC: 29.1 G/DL — LOW (ref 32–37)
MCV RBC AUTO: 94 FL — SIGNIFICANT CHANGE UP (ref 81–99)
MONOCYTES # BLD AUTO: 0.46 K/UL — SIGNIFICANT CHANGE UP (ref 0.1–0.6)
MONOCYTES NFR BLD AUTO: 15.3 % — HIGH (ref 1.7–9.3)
NEUTROPHILS # BLD AUTO: 1.56 K/UL — SIGNIFICANT CHANGE UP (ref 1.4–6.5)
NEUTROPHILS NFR BLD AUTO: 51.9 % — SIGNIFICANT CHANGE UP (ref 42.2–75.2)
NRBC # BLD: 0 /100 WBCS — SIGNIFICANT CHANGE UP (ref 0–0)
PLATELET # BLD AUTO: 123 K/UL — LOW (ref 130–400)
POTASSIUM SERPL-MCNC: 5.5 MMOL/L — HIGH (ref 3.5–5)
POTASSIUM SERPL-SCNC: 5.5 MMOL/L — HIGH (ref 3.5–5)
PROT PATTERN SERPL ELPH-IMP: SIGNIFICANT CHANGE UP
PROT SERPL-MCNC: 6.5 G/DL — SIGNIFICANT CHANGE UP (ref 6–8)
RBC # BLD: 2.67 M/UL — LOW (ref 4.2–5.4)
RBC # FLD: 17.5 % — HIGH (ref 11.5–14.5)
SODIUM SERPL-SCNC: 145 MMOL/L — SIGNIFICANT CHANGE UP (ref 135–146)
WBC # BLD: 3.01 K/UL — LOW (ref 4.8–10.8)
WBC # FLD AUTO: 3.01 K/UL — LOW (ref 4.8–10.8)

## 2022-01-24 PROCEDURE — 99233 SBSQ HOSP IP/OBS HIGH 50: CPT

## 2022-01-24 RX ORDER — IRON SUCROSE 20 MG/ML
200 INJECTION, SOLUTION INTRAVENOUS ONCE
Refills: 0 | Status: COMPLETED | OUTPATIENT
Start: 2022-01-24 | End: 2022-01-24

## 2022-01-24 RX ORDER — DEXTROSE 50 % IN WATER 50 %
50 SYRINGE (ML) INTRAVENOUS ONCE
Refills: 0 | Status: DISCONTINUED | OUTPATIENT
Start: 2022-01-24 | End: 2022-01-24

## 2022-01-24 RX ORDER — SODIUM ZIRCONIUM CYCLOSILICATE 10 G/10G
5 POWDER, FOR SUSPENSION ORAL
Refills: 0 | Status: DISCONTINUED | OUTPATIENT
Start: 2022-01-24 | End: 2022-01-24

## 2022-01-24 RX ORDER — DEXTROSE 10 % IN WATER 10 %
250 INTRAVENOUS SOLUTION INTRAVENOUS
Refills: 0 | Status: DISCONTINUED | OUTPATIENT
Start: 2022-01-24 | End: 2022-01-24

## 2022-01-24 RX ORDER — HEPARIN SODIUM 5000 [USP'U]/ML
5000 INJECTION INTRAVENOUS; SUBCUTANEOUS
Qty: 0 | Refills: 0 | DISCHARGE
Start: 2022-01-24

## 2022-01-24 RX ORDER — SODIUM ZIRCONIUM CYCLOSILICATE 10 G/10G
10 POWDER, FOR SUSPENSION ORAL
Qty: 70 | Refills: 0
Start: 2022-01-24 | End: 2022-01-30

## 2022-01-24 RX ORDER — INSULIN HUMAN 100 [IU]/ML
10 INJECTION, SOLUTION SUBCUTANEOUS ONCE
Refills: 0 | Status: DISCONTINUED | OUTPATIENT
Start: 2022-01-24 | End: 2022-01-24

## 2022-01-24 RX ORDER — PANTOPRAZOLE SODIUM 20 MG/1
1 TABLET, DELAYED RELEASE ORAL
Qty: 0 | Refills: 0 | DISCHARGE
Start: 2022-01-24

## 2022-01-24 RX ORDER — LANOLIN ALCOHOL/MO/W.PET/CERES
1 CREAM (GRAM) TOPICAL
Qty: 0 | Refills: 0 | DISCHARGE
Start: 2022-01-24

## 2022-01-24 RX ADMIN — SODIUM ZIRCONIUM CYCLOSILICATE 5 GRAM(S): 10 POWDER, FOR SUSPENSION ORAL at 12:00

## 2022-01-24 RX ADMIN — Medication 25 MILLIGRAM(S): at 17:18

## 2022-01-24 RX ADMIN — Medication 25 MILLIGRAM(S): at 05:40

## 2022-01-24 RX ADMIN — HEPARIN SODIUM 5000 UNIT(S): 5000 INJECTION INTRAVENOUS; SUBCUTANEOUS at 05:40

## 2022-01-24 RX ADMIN — PANTOPRAZOLE SODIUM 40 MILLIGRAM(S): 20 TABLET, DELAYED RELEASE ORAL at 05:40

## 2022-01-24 RX ADMIN — SODIUM ZIRCONIUM CYCLOSILICATE 5 GRAM(S): 10 POWDER, FOR SUSPENSION ORAL at 17:25

## 2022-01-24 RX ADMIN — IRON SUCROSE 110 MILLIGRAM(S): 20 INJECTION, SOLUTION INTRAVENOUS at 05:39

## 2022-01-24 NOTE — DISCHARGE NOTE PROVIDER - NSDCCPCAREPLAN_GEN_ALL_CORE_FT
PRINCIPAL DISCHARGE DIAGNOSIS  Diagnosis: GI bleed  Assessment and Plan of Treatment: You have anemia due to GI losses. Your anemia has been stable for now, and you received iron IV to correct the iron deficiency. Workup for anemia did not reveal anything else. please follow with Dr Gaston as outpatient. You did not have any episodes of marked GI bleed in the hospital      SECONDARY DISCHARGE DIAGNOSES  Diagnosis: 2019 novel coronavirus disease (COVID-19)  Assessment and Plan of Treatment: You were tested for covid and result was positive. But you did not have any symptoms, so you do not require any treatment or oxygen delivery    
no

## 2022-01-24 NOTE — CHART NOTE - NSCHARTNOTEFT_GEN_A_CORE
Patient seen and examined at bedside.  Patient has no complaints, safe to be discharged home with outpatient follow-up

## 2022-01-24 NOTE — DISCHARGE NOTE PROVIDER - NSDCFUSCHEDAPPT_GEN_ALL_CORE_FT
LATRICIA ARREAGA ; 03/02/2022 ; [facility] [address] LATRICIA ARREAGA ; 02/01/2022 ; [facility] HemOnc [address] LATRICIA Matthews ; 02/01/2022 ; [facility] [address] LATRICIA Wilson ; 02/02/2022 ; [facility] [address] LATRICIA Wilson ; 03/02/2022 ; [facility] [address]

## 2022-01-24 NOTE — CHART NOTE - NSCHARTNOTEFT_GEN_A_CORE
I attended COVID rounds with the Hospitalist and housestaff and called family.    [   ]  I attempted to reach                      but was unable to leave a message.  [   ]  I left a message with   [ x  ]  reached Jace Marroquin, [phone]          and gave an update on the patient's condition.

## 2022-01-24 NOTE — DISCHARGE NOTE PROVIDER - CARE PROVIDERS DIRECT ADDRESSES
[email or web address] [email or web address]

## 2022-01-24 NOTE — DISCHARGE NOTE NURSING/CASE MANAGEMENT/SOCIAL WORK - PATIENT PORTAL LINK FT
You can access the FollowMyHealth Patient Portal offered by [facility] by registering at the following website: [email or web address]. By joining [employer]’s FollowMyHealth portal, you will also be able to view your health information using other applications (apps) compatible with our system.

## 2022-01-24 NOTE — DISCHARGE NOTE PROVIDER - NSDCMRMEDTOKEN_GEN_ALL_CORE_FT
Metoprolol Tartrate 25 mg oral tablet: 1 tab(s) orally 2 times a day   aluminum hydroxide-magnesium hydroxide 200 mg-200 mg/5 mL oral suspension: 30 milliliter(s) orally every 4 hours, As needed, Dyspepsia  heparin: 5000 unit(s) subcutaneous 3 times a day  Lokelma 10 g oral powder for reconstitution: 10 gram(s) orally once a day   melatonin 3 mg oral tablet: 1 tab(s) orally once a day (at bedtime), As needed, Insomnia  Metoprolol Tartrate 25 mg oral tablet: 1 tab(s) orally 2 times a day  pantoprazole 40 mg oral delayed release tablet: 1 tab(s) orally once a day (before a meal)

## 2022-01-24 NOTE — DISCHARGE NOTE PROVIDER - PROVIDER TOKENS
PROVIDER:[TOKEN:[[id number]],FOLLOWUP:[Routine]],PROVIDER:[TOKEN:[[id number]],FOLLOWUP:[Routine]]

## 2022-01-24 NOTE — DISCHARGE NOTE NURSING/CASE MANAGEMENT/SOCIAL WORK - NSDCPEFALRISK_GEN_ALL_CORE
For information on Fall & Injury Prevention, visit: [email or web address] OR  [email or web address] OR  https://www.cdc.gov/steadi/patient.html

## 2022-01-24 NOTE — DISCHARGE NOTE PROVIDER - CARE PROVIDER_API CALL
Liu Gaston)  Internal Medicine; Medical Oncology  [address] [place] [address]  Phone: [phone]  Fax: [phone]  Follow Up Time: Routine    Gildardo Bill  [address]  [address] [place] [address]  Phone: [phone]  Fax: [phone]  Follow Up Time: Routine

## 2022-01-24 NOTE — CHART NOTE - NSCHARTNOTEFT_GEN_A_CORE
Hem/Onc following for pancytopenia.     #Chronic pancytopenia  #Hx of chronic anemia (likely due to CKD4 and obscure GI bleed), was on retacrit as O/P and received 2U PRBC on 12/30/21. Pt received Venofer 2 weeks ago.   -EGD 2020 revealed gastritis and VCE showed bleeding in small bowel.   -Workup so far:     Iron serum: 43 (9/2021)    TIBC 286    Ferritin 26 (in 11/2021)   Protein Electrophoresis, Serum (01.08.20 @ 11:09)    Protein Total, Serum: 6.9 g/dL    Serum Protein Electrophoresis Interp: Weak Gamma-Migrating Paraprotein Identified   Immunoglobulin Free Light Chains, Serum (01.10.20 @ 07:02)    FABY Kappa: 12.18 mg/dL    FABY Lambda: 5.84 mg/dL (01.10.20 @ 07:02)    [place]/Lambda Free Light Chain Ratio, Serum: 2.09 Ratio   No splenomegaly on CT    RECOMMENDATIONS:    -Pt has evidence of iron deficiency per last ferritin study and is receiving Venofer as O/P. S/p 3 doses of venofer 200mg  1/19/22, 1/22/22, 1/24/22  -B12, folate, LDH and hapto WNL  -F/u SPEP, IF; Kappa: 11.98, lambda 7.77, ratio normal; IgG, IgM and IgA level WNL  -Continue Retacrit 85790Q weekly   -Treat underlying infection   -GI follow up     Pt can come [facility] post discharge as she has been on isolation for >14 days since diagnosis. Please see if she can receive Retacrit in nursing home as well. Hem/Onc following for pancytopenia.     #Chronic pancytopenia  #Hx of chronic anemia (likely due to CKD4 and obscure GI bleed), was on retacrit as O/P and received 2U PRBC on 12/30/21. Pt received Venofer 2 weeks ago.   -EGD 2020 revealed gastritis and VCE showed bleeding in small bowel.   -Workup so far:     Iron serum: 43 (9/2021)    TIBC 286    Ferritin 26 (in 11/2021)   Protein Electrophoresis, Serum (01.08.20 @ 11:09)    Protein Total, Serum: 6.9 g/dL    Serum Protein Electrophoresis Interp: Weak Gamma-Migrating Paraprotein Identified   Immunoglobulin Free Light Chains, Serum (01.10.20 @ 07:02)    FABY Kappa: 12.18 mg/dL    FABY Lambda: 5.84 mg/dL (01.10.20 @ 07:02)    [place]/Lambda Free Light Chain Ratio, Serum: 2.09 Ratio   No splenomegaly on CT    RECOMMENDATIONS:    -Pt has evidence of iron deficiency per last ferritin study and is receiving Venofer as O/P. S/p 3 doses of venofer 200mg  1/19/22, 1/22/22, 1/24/22  -B12, folate, LDH and hapto WNL  -can give 1U PRBC prior to discharge  -F/u SPEP, IF; Kappa: 11.98, lambda 7.77, ratio normal; IgG, IgM and IgA level WNL  -Continue Retacrit 80570J weekly   -Treat underlying infection   -GI follow up     Pt can come to [facility] post discharge as she has been on isolation for >14 days since diagnosis. Please see if she can receive Retacrit in nursing home as well.

## 2022-01-24 NOTE — PROGRESS NOTE ADULT - SUBJECTIVE AND OBJECTIVE BOX
LATRICIA ARREAGA  76y  Female      Patient is a 76y old  Female who presents with a chief complaint of Covid infection.      INTERVAL HPI/OVERNIGHT EVENTS:      ******************************* REVIEW OF SYSTEMS:**********************************************    All other review of systems negative    *********************** VITALS ******************************************    T(F): 97 (01-24-22 @ 05:00)  HR: 72 (01-24-22 @ 05:00) (72 - 80)  BP: 134/63 (01-24-22 @ 05:00) (130/65 - 134/63)  RR: 18 (01-24-22 @ 05:00) (18 - 18)  SpO2: 94% (01-24-22 @ 05:00) (94% - 94%)            ******************************** PHYSICAL EXAM:**************************************************  GENERAL: NAD    PSYCH: no agitation, baseline mentation  HEENT:     NERVOUS SYSTEM:  Alert & Oriented X3,     PULMONARY: GRAHAM, CTA    CARDIOVASCULAR: S1S2 RRR    GI: Soft, NT, ND; BS present.    EXTREMITIES:  2+ Peripheral Pulses, No clubbing, cyanosis, or edema    LYMPH: No lymphadenopathy noted    SKIN: No rashes or lesions      **************************** LABS *******************************************************                          7.3    3.01  )-----------( 123      ( 24 Jan 2022 04:30 )             25.1     01-24    145  |  117<H>  |  46<H>  ----------------------------<  78  5.5<H>   |  15<L>  |  2.2<H>    Ca    8.1<L>      24 Jan 2022 04:30  Mg     2.4     01-24    TPro  6.5  /  Alb  3.6  /  TBili  0.5  /  DBili  x   /  AST  20  /  ALT  7   /  AlkPhos  77  01-24          Lactate Trend        CAPILLARY BLOOD GLUCOSE      POCT Blood Glucose.: 89 mg/dL (24 Jan 2022 11:14)          **************************Active Medications *******************************************  aspirin (Rash)  latex (Unknown)  penicillins (Unknown)      acetaminophen     Tablet .. 650 milliGRAM(s) Oral every 6 hours PRN  acetaminophen     Tablet .. 650 milliGRAM(s) Oral every 6 hours PRN  aluminum hydroxide/magnesium hydroxide/simethicone Suspension 30 milliLiter(s) Oral every 4 hours PRN  dextrose 10%. 250 milliLiter(s) IV Continuous <Continuous>  dextrose 40% Gel 15 Gram(s) Oral once  dextrose 5%. 1000 milliLiter(s) IV Continuous <Continuous>  dextrose 50% Injectable 25 Gram(s) IV Push once  glucagon  Injectable 1 milliGRAM(s) IntraMuscular once  heparin   Injectable 5000 Unit(s) SubCutaneous every 12 hours  insulin lispro (ADMELOG) corrective regimen sliding scale   SubCutaneous three times a day before meals  insulin regular  human recombinant 10 Unit(s) IV Push once  melatonin 3 milliGRAM(s) Oral at bedtime PRN  metoprolol tartrate 25 milliGRAM(s) Oral two times a day  ondansetron Injectable 4 milliGRAM(s) IV Push every 8 hours PRN  pantoprazole    Tablet 40 milliGRAM(s) Oral before breakfast  sodium zirconium cyclosilicate 5 Gram(s) Oral two times a day      ***************************************************  RADIOLOGY & ADDITIONAL TESTS:    Imaging Personally Reviewed:  [ ] YES  [ ] NO    HEALTH ISSUES - PROBLEM Dx:

## 2022-01-24 NOTE — PROGRESS NOTE ADULT - ASSESSMENT
74 y F with PMH of severe aortic stenosis, HTN, morbidly obese, CKD, recurrent admission for iron infusions, blood transfusions,immuni  Immunofixation showed weak IgG lambda migrating para protein. Free light chain ratio 2.1. Normal calcium. EGD and VCE was done. EGD revealed gastritis and VCE showed bleeding in small bowel.   EGD, Colono, VCE reviewed : no signs of active bleeding , presence of gastritis and diverticulosis. Pt presented to the ED with an episode of dark stools.  She had one episode last night, no FBPR, OLEKSANDR done in the ED was negative, no stool in the rectal vault. pt reports shortness of breath, cough with sputum production.     #Covid PNA  #Right sided opactiy on CXR  - pt was diagnosed 2 weeks ago. did not receive any treatment.   - was on Doxycycline & rocephin, discontinued as low procal  - QTc 498 msec on ECG done on 1/11, fu repeat ECG  - off systemic steroids   - inflammatory markers: CRP 7, Procal 0.08, Dimer 397, Ferritin 29    #Dark stools   - pt has a hx of DARRIN, follows with Dr Gaston.   - OLEKSANDR was negative in the ED.  -CT Abdomen and Pelvis No Cont (01.17.22 @ 19:09) >Scattered prominent periaortic, periportal mesenteric lymph nodes. Finding is nonspecific and can represent infectious, inflammatory or neoplastic etiologies. Indeterminate left adrenal nodule. Consider nonemergent/outpatient follow-up with CT adrenal protocol as clinically indicated.  - active type and screen.   - pt has EGD and Colono in 2019 and 2020 showing gastritis, diverticulosis, grade 2 hemorrhoids.      #DARRIN  ( Baseline Hb 7-8)  #Pancytopenia  - Heme onc following, known pt of Dr. Gaston, recs appreciated  - pancytopenia could be related to COVID  -Repeat iron studies including serum Iron, TIBC, ferritin), reticulocyte count, LDH, haptoglobin, vit b12, folate (ordered for 1/18)  -Check myeloma panel (SPEP, UPEP, serum IF, FLC, Ig levels) (ordered for 1/18)  -Continue EPO  19921J weekly --> dose given 1/18    dvt ppx: Heparin SQ  GI prophylaxis: Not req  Code: Full code  Diet: Dash/ Tlc    D/C planning to SNF.   #Progress Note Handoff  Pending (specify):   Disposition:   SNF

## 2022-01-24 NOTE — DISCHARGE NOTE PROVIDER - HOSPITAL COURSE
74 y F with PMH of severe aortic stenosis, HTN, morbidly obese, CKD, chronic anemia transfusion-dependent and on ferrous sulfate IV, probably secondary to GI bleed, is admitted for dark stools, as well as shortness of breath and productive cough.  Patient was found to be covid positive.    #Covid PNA  #Right sided opactiy on CXR  - Patient was tested positive 2 weeks ago;  - Patient does not require oxygen therapy and procalcitonin is negative, no need for treatment and for corticosteroids  - Patient was not-oxygen dependant throughout hospital stay    #Dark stools   - Patient has a history of gastritis, diverticulosis and grade 2 hemorrhoids  - Probably, iron deficiency anemia is secondary to GI loss, follows with Dr Gaston  - OLEKSANDR was negative in the ED.  -Patient received iron sulfate IV in the hospital, Hb observed daily     #Pancytopenia  - Heme onc following, known pt of Dr. Gaston  - pancytopenia could be related to COVID  - Additional workup did not show anything relevant beside iron deficiency, elevated kappa and lambda chain with normal ratio  - Patient will follow as outpatient   76 y F with PMH of severe aortic stenosis, HTN, morbidly obese, CKD, chronic anemia transfusion-dependent and on ferrous sulfate IV, probably secondary to GI bleed, is admitted for dark stools, as well as shortness of breath and productive cough.  Patient was found to be covid positive.    #Covid PNA  #Right sided opactiy on CXR  - Patient was tested positive 2 weeks ago;  - Patient does not require oxygen therapy and procalcitonin is negative, no need for treatment and for corticosteroids  - Patient was not-oxygen dependant throughout hospital stay    #Dark stools   - Patient has a history of gastritis, diverticulosis and grade 2 hemorrhoids  - Probably, iron deficiency anemia is secondary to GI loss, follows with Dr Gaston  - OLEKSANDR was negative in the ED.  -Patient received iron sulfate IV in the hospital, Hb observed daily     #Pancytopenia  - Heme onc following, known pt of Dr. Gaston  - pancytopenia could be related to COVID  - Additional workup did not show anything relevant beside iron deficiency, elevated kappa and lambda chain with normal ratio  - Patient will follow as outpatient

## 2022-01-28 DIAGNOSIS — Z88.0 ALLERGY STATUS TO PENICILLIN: ICD-10-CM

## 2022-01-28 DIAGNOSIS — Z91.040 LATEX ALLERGY STATUS: ICD-10-CM

## 2022-01-28 DIAGNOSIS — U07.1 COVID-19: ICD-10-CM

## 2022-01-28 DIAGNOSIS — D61.818 OTHER PANCYTOPENIA: ICD-10-CM

## 2022-01-28 DIAGNOSIS — D50.0 IRON DEFICIENCY ANEMIA SECONDARY TO BLOOD LOSS (CHRONIC): ICD-10-CM

## 2022-01-28 DIAGNOSIS — J12.82 PNEUMONIA DUE TO CORONAVIRUS DISEASE 2019: ICD-10-CM

## 2022-01-28 DIAGNOSIS — Z88.6 ALLERGY STATUS TO ANALGESIC AGENT: ICD-10-CM

## 2022-01-28 DIAGNOSIS — K92.1 MELENA: ICD-10-CM

## 2022-01-28 DIAGNOSIS — E66.01 MORBID (SEVERE) OBESITY DUE TO EXCESS CALORIES: ICD-10-CM

## 2022-01-28 DIAGNOSIS — N18.4 CHRONIC KIDNEY DISEASE, STAGE 4 (SEVERE): ICD-10-CM

## 2022-01-28 DIAGNOSIS — E87.5 HYPERKALEMIA: ICD-10-CM

## 2022-01-28 DIAGNOSIS — I12.9 HYPERTENSIVE CHRONIC KIDNEY DISEASE WITH STAGE 1 THROUGH STAGE 4 CHRONIC KIDNEY DISEASE, OR UNSPECIFIED CHRONIC KIDNEY DISEASE: ICD-10-CM

## 2022-01-28 DIAGNOSIS — E27.9 DISORDER OF ADRENAL GLAND, UNSPECIFIED: ICD-10-CM

## 2022-01-28 DIAGNOSIS — R10.9 UNSPECIFIED ABDOMINAL PAIN: ICD-10-CM

## 2022-01-28 DIAGNOSIS — I35.0 NONRHEUMATIC AORTIC (VALVE) STENOSIS: ICD-10-CM

## 2022-01-31 ENCOUNTER — NON-APPOINTMENT (OUTPATIENT)
Age: 77
End: 2022-01-31

## 2022-01-31 ENCOUNTER — EMERGENCY (EMERGENCY)
Facility: HOSPITAL | Age: 77
LOS: 0 days | Discharge: HOME | End: 2022-02-01
Attending: STUDENT IN AN ORGANIZED HEALTH CARE EDUCATION/TRAINING PROGRAM | Admitting: EMERGENCY MEDICINE
Payer: MEDICARE

## 2022-01-31 VITALS
HEIGHT: 67 IN | DIASTOLIC BLOOD PRESSURE: 78 MMHG | SYSTOLIC BLOOD PRESSURE: 128 MMHG | OXYGEN SATURATION: 100 % | HEART RATE: 72 BPM | TEMPERATURE: 99 F | RESPIRATION RATE: 18 BRPM

## 2022-01-31 DIAGNOSIS — Z87.19 PERSONAL HISTORY OF OTHER DISEASES OF THE DIGESTIVE SYSTEM: Chronic | ICD-10-CM

## 2022-01-31 DIAGNOSIS — R79.89 OTHER SPECIFIED ABNORMAL FINDINGS OF BLOOD CHEMISTRY: ICD-10-CM

## 2022-01-31 DIAGNOSIS — E66.01 MORBID (SEVERE) OBESITY DUE TO EXCESS CALORIES: ICD-10-CM

## 2022-01-31 DIAGNOSIS — Z88.0 ALLERGY STATUS TO PENICILLIN: ICD-10-CM

## 2022-01-31 DIAGNOSIS — D64.9 ANEMIA, UNSPECIFIED: ICD-10-CM

## 2022-01-31 DIAGNOSIS — Z91.040 LATEX ALLERGY STATUS: ICD-10-CM

## 2022-01-31 DIAGNOSIS — I12.9 HYPERTENSIVE CHRONIC KIDNEY DISEASE WITH STAGE 1 THROUGH STAGE 4 CHRONIC KIDNEY DISEASE, OR UNSPECIFIED CHRONIC KIDNEY DISEASE: ICD-10-CM

## 2022-01-31 DIAGNOSIS — Z20.822 CONTACT WITH AND (SUSPECTED) EXPOSURE TO COVID-19: ICD-10-CM

## 2022-01-31 DIAGNOSIS — R06.02 SHORTNESS OF BREATH: ICD-10-CM

## 2022-01-31 DIAGNOSIS — Z88.6 ALLERGY STATUS TO ANALGESIC AGENT: ICD-10-CM

## 2022-01-31 DIAGNOSIS — N18.9 CHRONIC KIDNEY DISEASE, UNSPECIFIED: ICD-10-CM

## 2022-01-31 PROCEDURE — 99285 EMERGENCY DEPT VISIT HI MDM: CPT

## 2022-01-31 RX ORDER — PANTOPRAZOLE SODIUM 20 MG/1
80 TABLET, DELAYED RELEASE ORAL ONCE
Refills: 0 | Status: COMPLETED | OUTPATIENT
Start: 2022-01-31 | End: 2022-01-31

## 2022-01-31 RX ORDER — DIPHENHYDRAMINE HCL 50 MG
50 CAPSULE ORAL ONCE
Refills: 0 | Status: COMPLETED | OUTPATIENT
Start: 2022-01-31 | End: 2022-02-01

## 2022-01-31 NOTE — ED PROVIDER NOTE - PHYSICAL EXAMINATION
CONSTITUTIONAL: Well-developed; well-nourished; in no acute distress.   SKIN: warm, dry  HEAD: Normocephalic; atraumatic.  EYES: PERRL, EOMI, normal sclera and conjunctiva   ENT: No nasal discharge; airway clear.  NECK: Supple; non tender.  CARD:  Regular rate and rhythm.   RESP: NO inc WOB , speaking in full sentences, lungs CTAB  ABD: soft ntnd  EXT: Normal ROM.  no LE edema no unilateral leg swelling  NEURO: Alert, oriented, grossly unremarkable  PSYCH: Cooperative, appropriate.  OLEKSANDR: light brown stool, no BRB or melena CONSTITUTIONAL: Well-developed; well-nourished; in no acute distress.   SKIN: warm, dry  HEAD: Normocephalic; atraumatic.  EYES: PERRL, EOMI, normal sclera and conjunctiva   ENT: No nasal discharge; airway clear.  NECK: Supple; non tender.  CARD:  Regular rate and rhythm.   RESP: + tachypneic , some difficulty speaking full sentences   ABD: soft ntnd  EXT: Normal ROM.  no LE edema no unilateral leg swelling  NEURO: Alert, oriented, grossly unremarkable  PSYCH: Cooperative, appropriate.  OLEKSANDR: light brown stool, no BRB or melena

## 2022-01-31 NOTE — ED PROVIDER NOTE - ATTENDING CONTRIBUTION TO CARE
pt sent in for low hb. chronic anemia, with work up. multiple transfusions. today noticed dark stools. no active bleeding. no ab pain or vomiting.  denies coffee ground emesis as noted in traige note.    pt in nad, pale conj cta, rrr, ab soft, nt, nd. brown stool on rakan.    check labs, prbc prn

## 2022-01-31 NOTE — ED ADULT NURSE REASSESSMENT NOTE - NS ED NURSE REASSESS COMMENT FT1
Assumed care from previous nurse c/o low hemoglobin , for blood transfusion , awaiting for blood , pt AOx 4 , on 2 L NC

## 2022-01-31 NOTE — ED PROVIDER NOTE - OBJECTIVE STATEMENT
76 y F with PMH of severe aortic stenosis, HTN, morbidly obese, CKD, chronic anemia transfusion-dependent and on ferrous sulfate IV, probably secondary to GI bleed, is admitted for dark stools 76 y F with PMH of severe aortic stenosis, HTN, morbidly obese, CKD, chronic anemia transfusion-dependent and on ferrous sulfate IV ( pt reports she gets frequent transfusions at [facility]) presenting w/ low hemoglobin. pt also complaining of SOB which she reports she always gets when she is anemic. Of note pt is currently being followed by Dr. Gaston for anemia workup.  Pt had endoscopy and capsule enteroscopy in 2020 which showed gastritis and duodenitis but no ulcers ( records in [facility]) . Last colonoscopy 2019 which showed diverticulosis and grade 2 hemmorhoids. no active bleed. . She is also complaining of dark stool. no bright red blood. no abdominal pain. Denies coffee ground emesis ( noted in triage note but pt adamantly denies this) . no fevers or chills. no blood thinners.

## 2022-01-31 NOTE — ED ADULT NURSE NOTE - NS ED NRS NURSING HOMES
Formerly [facility] and [facility], [facility]

## 2022-01-31 NOTE — ED ADULT NURSE NOTE - CHIEF COMPLAINT QUOTE
BIBA from [facility]. Sent in for low hemoglobin of 6.0. Pt now complaining of abdominal pain and coffee ground emesis. + COVID on 1/17/22.

## 2022-01-31 NOTE — ED ADULT TRIAGE NOTE - CHIEF COMPLAINT QUOTE
BIBA from [facility]. Sent in for low hemoglobin of 6.0. Pt now complaining of abdominal pain and coffee ground emesis. + COVID on 1/17/22.

## 2022-01-31 NOTE — ED ADULT NURSE NOTE - OBJECTIVE STATEMENT
pt bibems in from [facility] for hemoglobin of 6. pt states she has been having black stool for 3 days. also endorses sob. vitals wnl in the field and on arrival. has hx of gi bleed. goes to [facility] once a week for infusions

## 2022-02-01 ENCOUNTER — APPOINTMENT (OUTPATIENT)
Dept: INFUSION THERAPY | Facility: CLINIC | Age: 77
End: 2022-02-01

## 2022-02-01 ENCOUNTER — APPOINTMENT (OUTPATIENT)
Dept: HEMATOLOGY ONCOLOGY | Facility: CLINIC | Age: 77
End: 2022-02-01

## 2022-02-01 VITALS
DIASTOLIC BLOOD PRESSURE: 88 MMHG | OXYGEN SATURATION: 99 % | SYSTOLIC BLOOD PRESSURE: 127 MMHG | RESPIRATION RATE: 20 BRPM | TEMPERATURE: 98 F | HEART RATE: 103 BPM

## 2022-02-01 LAB
ALBUMIN SERPL ELPH-MCNC: 3.7 G/DL — SIGNIFICANT CHANGE UP (ref 3.5–5.2)
ALP SERPL-CCNC: 77 U/L — SIGNIFICANT CHANGE UP (ref 30–115)
ALT FLD-CCNC: 7 U/L — SIGNIFICANT CHANGE UP (ref 0–41)
ANION GAP SERPL CALC-SCNC: 7 MMOL/L — SIGNIFICANT CHANGE UP (ref 7–14)
APPEARANCE UR: CLEAR — SIGNIFICANT CHANGE UP
APTT BLD: 39.3 SEC — HIGH (ref 27–39.2)
AST SERPL-CCNC: 17 U/L — SIGNIFICANT CHANGE UP (ref 0–41)
BACTERIA # UR AUTO: NEGATIVE — SIGNIFICANT CHANGE UP
BASOPHILS # BLD AUTO: 0.01 K/UL — SIGNIFICANT CHANGE UP (ref 0–0.2)
BASOPHILS NFR BLD AUTO: 0.3 % — SIGNIFICANT CHANGE UP (ref 0–1)
BILIRUB SERPL-MCNC: 0.5 MG/DL — SIGNIFICANT CHANGE UP (ref 0.2–1.2)
BILIRUB UR-MCNC: NEGATIVE — SIGNIFICANT CHANGE UP
BLD GP AB SCN SERPL QL: SIGNIFICANT CHANGE UP
BUN SERPL-MCNC: 37 MG/DL — HIGH (ref 10–20)
CALCIUM SERPL-MCNC: 8.2 MG/DL — LOW (ref 8.5–10.1)
CHLORIDE SERPL-SCNC: 113 MMOL/L — HIGH (ref 98–110)
CO2 SERPL-SCNC: 22 MMOL/L — SIGNIFICANT CHANGE UP (ref 17–32)
COLOR SPEC: SIGNIFICANT CHANGE UP
CREAT SERPL-MCNC: 2.1 MG/DL — HIGH (ref 0.7–1.5)
DIFF PNL FLD: NEGATIVE — SIGNIFICANT CHANGE UP
EOSINOPHIL # BLD AUTO: 0.25 K/UL — SIGNIFICANT CHANGE UP (ref 0–0.7)
EOSINOPHIL NFR BLD AUTO: 7.7 % — SIGNIFICANT CHANGE UP (ref 0–8)
EPI CELLS # UR: 1 /HPF — SIGNIFICANT CHANGE UP (ref 0–5)
GLUCOSE SERPL-MCNC: 90 MG/DL — SIGNIFICANT CHANGE UP (ref 70–99)
GLUCOSE UR QL: NEGATIVE — SIGNIFICANT CHANGE UP
HCT VFR BLD CALC: 21.2 % — LOW (ref 37–47)
HGB BLD-MCNC: 6.2 G/DL — CRITICAL LOW (ref 12–16)
HYALINE CASTS # UR AUTO: 0 /LPF — SIGNIFICANT CHANGE UP (ref 0–7)
IMM GRANULOCYTES NFR BLD AUTO: 0.6 % — HIGH (ref 0.1–0.3)
INR BLD: 1.02 RATIO — SIGNIFICANT CHANGE UP (ref 0.65–1.3)
KETONES UR-MCNC: NEGATIVE — SIGNIFICANT CHANGE UP
LEUKOCYTE ESTERASE UR-ACNC: NEGATIVE — SIGNIFICANT CHANGE UP
LYMPHOCYTES # BLD AUTO: 0.7 K/UL — LOW (ref 1.2–3.4)
LYMPHOCYTES # BLD AUTO: 21.5 % — SIGNIFICANT CHANGE UP (ref 20.5–51.1)
MCHC RBC-ENTMCNC: 27.8 PG — SIGNIFICANT CHANGE UP (ref 27–31)
MCHC RBC-ENTMCNC: 29.2 G/DL — LOW (ref 32–37)
MCV RBC AUTO: 95.1 FL — SIGNIFICANT CHANGE UP (ref 81–99)
MONOCYTES # BLD AUTO: 0.39 K/UL — SIGNIFICANT CHANGE UP (ref 0.1–0.6)
MONOCYTES NFR BLD AUTO: 12 % — HIGH (ref 1.7–9.3)
NEUTROPHILS # BLD AUTO: 1.88 K/UL — SIGNIFICANT CHANGE UP (ref 1.4–6.5)
NEUTROPHILS NFR BLD AUTO: 57.9 % — SIGNIFICANT CHANGE UP (ref 42.2–75.2)
NITRITE UR-MCNC: NEGATIVE — SIGNIFICANT CHANGE UP
NRBC # BLD: 0 /100 WBCS — SIGNIFICANT CHANGE UP (ref 0–0)
NT-PROBNP SERPL-SCNC: 727 PG/ML — HIGH (ref 0–300)
PH UR: 5.5 — SIGNIFICANT CHANGE UP (ref 5–8)
PLATELET # BLD AUTO: 146 K/UL — SIGNIFICANT CHANGE UP (ref 130–400)
POTASSIUM SERPL-MCNC: 5.5 MMOL/L — HIGH (ref 3.5–5)
POTASSIUM SERPL-SCNC: 5.5 MMOL/L — HIGH (ref 3.5–5)
PROT SERPL-MCNC: 6.2 G/DL — SIGNIFICANT CHANGE UP (ref 6–8)
PROT UR-MCNC: ABNORMAL
PROTHROM AB SERPL-ACNC: 11.7 SEC — SIGNIFICANT CHANGE UP (ref 9.95–12.87)
RBC # BLD: 2.23 M/UL — LOW (ref 4.2–5.4)
RBC # FLD: 19.3 % — HIGH (ref 11.5–14.5)
RBC CASTS # UR COMP ASSIST: 1 /HPF — SIGNIFICANT CHANGE UP (ref 0–4)
SARS-COV-2 RNA SPEC QL NAA+PROBE: SIGNIFICANT CHANGE UP
SODIUM SERPL-SCNC: 142 MMOL/L — SIGNIFICANT CHANGE UP (ref 135–146)
SP GR SPEC: 1.02 — SIGNIFICANT CHANGE UP (ref 1.01–1.03)
UROBILINOGEN FLD QL: SIGNIFICANT CHANGE UP
WBC # BLD: 3.25 K/UL — LOW (ref 4.8–10.8)
WBC # FLD AUTO: 3.25 K/UL — LOW (ref 4.8–10.8)
WBC UR QL: 1 /HPF — SIGNIFICANT CHANGE UP (ref 0–5)

## 2022-02-01 PROCEDURE — 99220: CPT

## 2022-02-01 PROCEDURE — 93010 ELECTROCARDIOGRAM REPORT: CPT

## 2022-02-01 PROCEDURE — 71045 X-RAY EXAM CHEST 1 VIEW: CPT | Mod: 26

## 2022-02-01 RX ADMIN — Medication 50 MILLIGRAM(S): at 03:30

## 2022-02-01 RX ADMIN — PANTOPRAZOLE SODIUM 80 MILLIGRAM(S): 20 TABLET, DELAYED RELEASE ORAL at 00:30

## 2022-02-01 RX ADMIN — Medication 125 MILLIGRAM(S): at 03:25

## 2022-02-01 NOTE — ED CDU PROVIDER DISPOSITION NOTE - CLINICAL COURSE
Patient evaluated for anemia.  Hemoglobin low requiring blood transfusion, history of anemia with frequent blood transfusion and iron transfusion in the past.  Patient improved, received 2 units of PRBC with no adverse reaction.  Vital signs stable.  Patient endorsing dysuria, urinalysis sent, culture sent for later follow-up.  Patient instructed to follow-up with heme-onc and PMD.

## 2022-02-01 NOTE — ED ADULT NURSE REASSESSMENT NOTE - NS ED NURSE REASSESS COMMENT FT1
Started blood transfusion at 0335 , negative of any blood transfusion reaction noted, afebrile , no SOB

## 2022-02-01 NOTE — ED CDU PROVIDER INITIAL DAY NOTE - PROGRESS NOTE DETAILS
Received sign out from DAKOTA Moy. Patient is currently receiving 2nd unit of PRBC's. She is feeling well without complaints at present. Patient completed two units of PRBC's. She states that she has been with some urinary urgency - will obtain and check urinalysis and likely dispo back to facility.

## 2022-02-01 NOTE — ED CDU PROVIDER DISPOSITION NOTE - PATIENT PORTAL LINK FT
You can access the FollowMyHealth Patient Portal offered by [facility] by registering at the following website: [email or web address]. By joining [employer]’s FollowMyHealth portal, you will also be able to view your health information using other applications (apps) compatible with our system. You can access the FollowMyHealth Patient Portal offered by [facility] by registering at the following website: [email or web address]. By joining [employer]’s FollowMyHealth portal, you will also be able to view your health information using other applications (apps) compatible with our system.

## 2022-02-01 NOTE — ED CDU PROVIDER INITIAL DAY NOTE - ATTENDING CONTRIBUTION TO CARE
I assumed care of this patient on February 1, 2022 at 7 AM.    76-year-old female past medical history of aortic stenosis, hypertension, CKD, chronic anemia requiring transfusions in the past presents with low hemoglobin.  Patient also endorsing mild shortness of breath accompanied with her chronic anemia.  Patient feeling improved, receiving a transfusion upon my assessment.  No melena, no hematemesis, no coffee-ground emesis, no fever/chills, no chest pain, no shortness of breath, no nausea/vomiting/diarrhea, no urinary symptoms, no abdominal pain.    CONSTITUTIONAL: Well-developed; well-nourished; in no acute distress. Sitting up and providing appropriate history and physical examination  SKIN: skin exam is warm and dry, no acute rash.  HEAD: Normocephalic; atraumatic.  EYES: PERRL, 3 mm bilateral, no nystagmus, EOM intact; conjunctiva and sclera clear.  ENT: No nasal discharge; airway clear.  NECK: Supple; non tender. + full passive ROM in all directions. No JVD  CARD: S1, S2 normal; no murmurs, gallops, or rubs. Regular rate and rhythm. + Symmetric Strong Pulses  RESP: No wheezes, rales or rhonchi. Good air movement bilaterally  ABD: soft; non-distended; non-tender. No Rebound, No Guarding, No signs of peritonitis, No CVA tenderness. No pulsatile abdominal mass. + Strong and Symmetric Pulses  EXT: Normal ROM. No clubbing, cyanosis or edema. Dp and Pt Pulses intact. Cap refill less than 3 seconds  NEURO: CN 2-12 intact, normal finger to nose, normal romberg, no sensory or motor deficits, Alert, oriented, grossly unremarkable. No Focal deficits. GCS 15. NIH 0  PSYCH: Cooperative, appropriate.

## 2022-02-01 NOTE — ED ADULT NURSE REASSESSMENT NOTE - NS ED NURSE REASSESS COMMENT FT1
Second unit of blood transfusion was given , negtaive of any blood transfusion reaction noted , endorsed to day shift RN

## 2022-02-01 NOTE — ED CDU PROVIDER INITIAL DAY NOTE - OBJECTIVE STATEMENT
75 yo F with PMHX of severe aortic stenosis, HTN, CKD, chronic anemia--transfusion dependent and on ferrous sulfate IV presents to the ED c/o low hemoglobin. Pt also reports mild SOB which she usually feels when she is anemic. She follows with Dr. Gaston. Pt also complained of dark stools but OLEKSANDR in ED showed brown stool. She denies other complaints. Pt denies fever, chills, nausea, vomiting, abdominal pain, diarrhea, headache, dizziness, weakness, chest pain, back pain, LOC, trauma, urinary symptoms, cough.

## 2022-02-01 NOTE — ED CDU PROVIDER INITIAL DAY NOTE - MEDICAL DECISION MAKING DETAILS
I personally evaluated the patient. I reviewed the Resident´s or Physician Assistant´s note (as assigned above), and agree with the findings and plan except as documented in my note.  Patient evaluated for anemia.  Noted to have low hemoglobin in the ED, placed in ED observation for further evaluation and treatment.  Patient receiving second unit of PRBCs upon my evaluation, symptoms improved.  We will continue to monitor as patient received second unit of blood.

## 2022-02-01 NOTE — ED CDU PROVIDER DISPOSITION NOTE - CARE PROVIDER_API CALL
Annemarie Veloz)  Hematology; Internal Medicine; Medical Oncology  [address] [place] [address]  Phone: [phone]  Fax: [phone]  Follow Up Time: 1-3 Days

## 2022-02-02 ENCOUNTER — APPOINTMENT (OUTPATIENT)
Dept: INFUSION THERAPY | Facility: CLINIC | Age: 77
End: 2022-02-02

## 2022-02-02 LAB
CULTURE RESULTS: SIGNIFICANT CHANGE UP
SPECIMEN SOURCE: SIGNIFICANT CHANGE UP

## 2022-02-02 NOTE — ED ADULT NURSE NOTE - NS ED NOTE  TALK SOMEONE YN
Show Applicator Variable?: Yes
Consent: The patient's consent was obtained including but not limited to risks of crusting, scabbing, blistering, scarring, darker or lighter pigmentary change, recurrence, incomplete removal and infection.
No
Number Of Freeze-Thaw Cycles: 1 freeze-thaw cycle
Detail Level: Detailed
Post-Care Instructions: I reviewed with the patient in detail post-care instructions. Patient is to wear sunprotection, and avoid picking at any of the treated lesions. Pt may apply Vaseline to crusted or scabbing areas.
Render Post-Care Instructions In Note?: no
Duration Of Freeze Thaw-Cycle (Seconds): 3

## 2022-02-10 ENCOUNTER — LABORATORY RESULT (OUTPATIENT)
Age: 77
End: 2022-02-10

## 2022-02-10 ENCOUNTER — APPOINTMENT (OUTPATIENT)
Dept: HEMATOLOGY ONCOLOGY | Facility: CLINIC | Age: 77
End: 2022-02-10

## 2022-02-10 LAB
ABO + RH PNL BLD: NORMAL
BLD GP AB SCN SERPL QL: NORMAL
HCT VFR BLD CALC: 24.3 %
HGB BLD-MCNC: 7 G/DL
MCHC RBC-ENTMCNC: 28.7 PG
MCHC RBC-ENTMCNC: 28.8 G/DL
MCV RBC AUTO: 99.6 FL
PLATELET # BLD AUTO: 90 K/UL
PMV BLD: 12.2 FL
RBC # BLD: 2.44 M/UL
RBC # FLD: 17.3 %
WBC # FLD AUTO: 3.22 K/UL

## 2022-02-11 ENCOUNTER — APPOINTMENT (OUTPATIENT)
Dept: INFUSION THERAPY | Facility: CLINIC | Age: 77
End: 2022-02-11

## 2022-02-11 RX ORDER — HYDROCORTISONE 20 MG
100 TABLET ORAL ONCE
Refills: 0 | Status: COMPLETED | OUTPATIENT
Start: 2022-02-11 | End: 2022-05-19

## 2022-02-11 RX ORDER — ACETAMINOPHEN 500 MG
650 TABLET ORAL ONCE
Refills: 0 | Status: COMPLETED | OUTPATIENT
Start: 2022-02-11 | End: 2022-03-24

## 2022-02-11 RX ORDER — ERYTHROPOIETIN 10000 [IU]/ML
20000 INJECTION, SOLUTION INTRAVENOUS; SUBCUTANEOUS ONCE
Refills: 0 | Status: COMPLETED | OUTPATIENT
Start: 2022-02-11 | End: 2022-02-11

## 2022-02-11 RX ADMIN — ERYTHROPOIETIN 20000 UNIT(S): 10000 INJECTION, SOLUTION INTRAVENOUS; SUBCUTANEOUS at 10:43

## 2022-02-12 LAB — FERRITIN SERPL-MCNC: 43 NG/ML

## 2022-02-14 ENCOUNTER — LABORATORY RESULT (OUTPATIENT)
Age: 77
End: 2022-02-14

## 2022-02-14 ENCOUNTER — APPOINTMENT (OUTPATIENT)
Dept: HEMATOLOGY ONCOLOGY | Facility: CLINIC | Age: 77
End: 2022-02-14
Payer: MEDICARE

## 2022-02-14 VITALS
TEMPERATURE: 96.6 F | RESPIRATION RATE: 16 BRPM | SYSTOLIC BLOOD PRESSURE: 131 MMHG | HEART RATE: 67 BPM | BODY MASS INDEX: 40.5 KG/M2 | WEIGHT: 252 LBS | OXYGEN SATURATION: 98 % | HEIGHT: 66 IN | DIASTOLIC BLOOD PRESSURE: 58 MMHG

## 2022-02-14 LAB
HCT VFR BLD CALC: 23.4 %
HGB BLD-MCNC: 6.7 G/DL
MCHC RBC-ENTMCNC: 28.6 G/DL
MCHC RBC-ENTMCNC: 28.6 PG
MCV RBC AUTO: 100 FL
PLATELET # BLD AUTO: 98 K/UL
PMV BLD: 11 FL
RBC # BLD: 2.34 M/UL
RBC # FLD: 18 %
WBC # FLD AUTO: 4.12 K/UL

## 2022-02-14 PROCEDURE — 99213 OFFICE O/P EST LOW 20 MIN: CPT

## 2022-02-15 ENCOUNTER — INPATIENT (INPATIENT)
Facility: HOSPITAL | Age: 77
LOS: 7 days | Discharge: NURSING, REHAB OR LONG-TERM CARE | End: 2022-02-23
Attending: STUDENT IN AN ORGANIZED HEALTH CARE EDUCATION/TRAINING PROGRAM | Admitting: STUDENT IN AN ORGANIZED HEALTH CARE EDUCATION/TRAINING PROGRAM
Payer: MEDICARE

## 2022-02-15 ENCOUNTER — NON-APPOINTMENT (OUTPATIENT)
Age: 77
End: 2022-02-15

## 2022-02-15 ENCOUNTER — APPOINTMENT (OUTPATIENT)
Dept: INFUSION THERAPY | Facility: CLINIC | Age: 77
End: 2022-02-15

## 2022-02-15 VITALS
HEART RATE: 71 BPM | TEMPERATURE: 98 F | DIASTOLIC BLOOD PRESSURE: 66 MMHG | WEIGHT: 250 LBS | OXYGEN SATURATION: 99 % | HEIGHT: 67 IN | SYSTOLIC BLOOD PRESSURE: 146 MMHG | RESPIRATION RATE: 18 BRPM

## 2022-02-15 DIAGNOSIS — E87.5 HYPERKALEMIA: ICD-10-CM

## 2022-02-15 DIAGNOSIS — K29.70 GASTRITIS, UNSPECIFIED, WITHOUT BLEEDING: ICD-10-CM

## 2022-02-15 DIAGNOSIS — Z86.16 PERSONAL HISTORY OF COVID-19: ICD-10-CM

## 2022-02-15 DIAGNOSIS — E87.71 TRANSFUSION ASSOCIATED CIRCULATORY OVERLOAD: ICD-10-CM

## 2022-02-15 DIAGNOSIS — Z87.19 PERSONAL HISTORY OF OTHER DISEASES OF THE DIGESTIVE SYSTEM: Chronic | ICD-10-CM

## 2022-02-15 DIAGNOSIS — N17.9 ACUTE KIDNEY FAILURE, UNSPECIFIED: ICD-10-CM

## 2022-02-15 DIAGNOSIS — I13.0 HYPERTENSIVE HEART AND CHRONIC KIDNEY DISEASE WITH HEART FAILURE AND STAGE 1 THROUGH STAGE 4 CHRONIC KIDNEY DISEASE, OR UNSPECIFIED CHRONIC KIDNEY DISEASE: ICD-10-CM

## 2022-02-15 DIAGNOSIS — R07.9 CHEST PAIN, UNSPECIFIED: ICD-10-CM

## 2022-02-15 DIAGNOSIS — K31.811 ANGIODYSPLASIA OF STOMACH AND DUODENUM WITH BLEEDING: ICD-10-CM

## 2022-02-15 DIAGNOSIS — E66.01 MORBID (SEVERE) OBESITY DUE TO EXCESS CALORIES: ICD-10-CM

## 2022-02-15 DIAGNOSIS — D61.818 OTHER PANCYTOPENIA: ICD-10-CM

## 2022-02-15 DIAGNOSIS — K57.30 DIVERTICULOSIS OF LARGE INTESTINE WITHOUT PERFORATION OR ABSCESS WITHOUT BLEEDING: ICD-10-CM

## 2022-02-15 DIAGNOSIS — Z88.8 ALLERGY STATUS TO OTHER DRUGS, MEDICAMENTS AND BIOLOGICAL SUBSTANCES: ICD-10-CM

## 2022-02-15 DIAGNOSIS — I50.33 ACUTE ON CHRONIC DIASTOLIC (CONGESTIVE) HEART FAILURE: ICD-10-CM

## 2022-02-15 DIAGNOSIS — I08.3 COMBINED RHEUMATIC DISORDERS OF MITRAL, AORTIC AND TRICUSPID VALVES: ICD-10-CM

## 2022-02-15 DIAGNOSIS — Z88.0 ALLERGY STATUS TO PENICILLIN: ICD-10-CM

## 2022-02-15 DIAGNOSIS — D62 ACUTE POSTHEMORRHAGIC ANEMIA: ICD-10-CM

## 2022-02-15 DIAGNOSIS — K21.9 GASTRO-ESOPHAGEAL REFLUX DISEASE WITHOUT ESOPHAGITIS: ICD-10-CM

## 2022-02-15 DIAGNOSIS — J96.01 ACUTE RESPIRATORY FAILURE WITH HYPOXIA: ICD-10-CM

## 2022-02-15 DIAGNOSIS — N18.4 CHRONIC KIDNEY DISEASE, STAGE 4 (SEVERE): ICD-10-CM

## 2022-02-15 DIAGNOSIS — L30.9 DERMATITIS, UNSPECIFIED: ICD-10-CM

## 2022-02-15 DIAGNOSIS — Z91.040 LATEX ALLERGY STATUS: ICD-10-CM

## 2022-02-15 DIAGNOSIS — K64.9 UNSPECIFIED HEMORRHOIDS: ICD-10-CM

## 2022-02-15 DIAGNOSIS — K76.6 PORTAL HYPERTENSION: ICD-10-CM

## 2022-02-15 DIAGNOSIS — K31.89 OTHER DISEASES OF STOMACH AND DUODENUM: ICD-10-CM

## 2022-02-15 LAB
ABO + RH PNL BLD: NORMAL
ALBUMIN SERPL ELPH-MCNC: 3.3 G/DL — LOW (ref 3.5–5.2)
ALP SERPL-CCNC: 81 U/L — SIGNIFICANT CHANGE UP (ref 30–115)
ALT FLD-CCNC: 7 U/L — SIGNIFICANT CHANGE UP (ref 0–41)
ANION GAP SERPL CALC-SCNC: 9 MMOL/L — SIGNIFICANT CHANGE UP (ref 7–14)
ANISOCYTOSIS BLD QL: SLIGHT — SIGNIFICANT CHANGE UP
APTT BLD: 35.5 SEC — SIGNIFICANT CHANGE UP (ref 27–39.2)
AST SERPL-CCNC: 24 U/L — SIGNIFICANT CHANGE UP (ref 0–41)
BASOPHILS # BLD AUTO: 0 K/UL — SIGNIFICANT CHANGE UP (ref 0–0.2)
BASOPHILS NFR BLD AUTO: 0 % — SIGNIFICANT CHANGE UP (ref 0–1)
BILIRUB SERPL-MCNC: 1.1 MG/DL — SIGNIFICANT CHANGE UP (ref 0.2–1.2)
BLD GP AB SCN SERPL QL: NORMAL
BLD GP AB SCN SERPL QL: SIGNIFICANT CHANGE UP
BUN SERPL-MCNC: 45 MG/DL — HIGH (ref 10–20)
CALCIUM SERPL-MCNC: 7.6 MG/DL — LOW (ref 8.5–10.1)
CHLORIDE SERPL-SCNC: 112 MMOL/L — HIGH (ref 98–110)
CO2 SERPL-SCNC: 20 MMOL/L — SIGNIFICANT CHANGE UP (ref 17–32)
CREAT SERPL-MCNC: 2.1 MG/DL — HIGH (ref 0.7–1.5)
EOSINOPHIL # BLD AUTO: 0 K/UL — SIGNIFICANT CHANGE UP (ref 0–0.7)
EOSINOPHIL NFR BLD AUTO: 0 % — SIGNIFICANT CHANGE UP (ref 0–8)
GIANT PLATELETS BLD QL SMEAR: PRESENT — SIGNIFICANT CHANGE UP
GLUCOSE SERPL-MCNC: 113 MG/DL — HIGH (ref 70–99)
HCT VFR BLD CALC: 21.9 % — LOW (ref 37–47)
HGB BLD-MCNC: 6.5 G/DL — CRITICAL LOW (ref 12–16)
INR BLD: 1.07 RATIO — SIGNIFICANT CHANGE UP (ref 0.65–1.3)
LYMPHOCYTES # BLD AUTO: 0.1 K/UL — LOW (ref 1.2–3.4)
LYMPHOCYTES # BLD AUTO: 2.6 % — LOW (ref 20.5–51.1)
MANUAL SMEAR VERIFICATION: SIGNIFICANT CHANGE UP
MCHC RBC-ENTMCNC: 28.8 PG — SIGNIFICANT CHANGE UP (ref 27–31)
MCHC RBC-ENTMCNC: 29.7 G/DL — LOW (ref 32–37)
MCV RBC AUTO: 96.9 FL — SIGNIFICANT CHANGE UP (ref 81–99)
MONOCYTES # BLD AUTO: 0.03 K/UL — LOW (ref 0.1–0.6)
MONOCYTES NFR BLD AUTO: 0.9 % — LOW (ref 1.7–9.3)
MYELOCYTES NFR BLD: 0.9 % — HIGH (ref 0–0)
NEUTROPHILS # BLD AUTO: 3.58 K/UL — SIGNIFICANT CHANGE UP (ref 1.4–6.5)
NEUTROPHILS NFR BLD AUTO: 95.6 % — HIGH (ref 42.2–75.2)
NRBC # BLD: 1 /100 — HIGH (ref 0–0)
NRBC # BLD: SIGNIFICANT CHANGE UP /100 WBCS (ref 0–0)
NT-PROBNP SERPL-SCNC: 1191 PG/ML — HIGH (ref 0–300)
PLAT MORPH BLD: NORMAL — SIGNIFICANT CHANGE UP
PLATELET # BLD AUTO: 95 K/UL — LOW (ref 130–400)
POIKILOCYTOSIS BLD QL AUTO: SLIGHT — SIGNIFICANT CHANGE UP
POLYCHROMASIA BLD QL SMEAR: SLIGHT — SIGNIFICANT CHANGE UP
POTASSIUM SERPL-MCNC: 5.5 MMOL/L — HIGH (ref 3.5–5)
POTASSIUM SERPL-SCNC: 5.5 MMOL/L — HIGH (ref 3.5–5)
PROT SERPL-MCNC: 5.9 G/DL — LOW (ref 6–8)
PROTHROM AB SERPL-ACNC: 12.3 SEC — SIGNIFICANT CHANGE UP (ref 9.95–12.87)
RBC # BLD: 2.26 M/UL — LOW (ref 4.2–5.4)
RBC # FLD: 17.2 % — HIGH (ref 11.5–14.5)
RBC BLD AUTO: ABNORMAL
SARS-COV-2 RNA SPEC QL NAA+PROBE: SIGNIFICANT CHANGE UP
SODIUM SERPL-SCNC: 141 MMOL/L — SIGNIFICANT CHANGE UP (ref 135–146)
TROPONIN T SERPL-MCNC: <0.01 NG/ML — SIGNIFICANT CHANGE UP
WBC # BLD: 3.74 K/UL — LOW (ref 4.8–10.8)
WBC # FLD AUTO: 3.74 K/UL — LOW (ref 4.8–10.8)

## 2022-02-15 PROCEDURE — 71045 X-RAY EXAM CHEST 1 VIEW: CPT | Mod: 26

## 2022-02-15 PROCEDURE — 93010 ELECTROCARDIOGRAM REPORT: CPT

## 2022-02-15 PROCEDURE — 99285 EMERGENCY DEPT VISIT HI MDM: CPT

## 2022-02-15 PROCEDURE — 93970 EXTREMITY STUDY: CPT | Mod: 26

## 2022-02-15 RX ORDER — FUROSEMIDE 40 MG
20 TABLET ORAL ONCE
Refills: 0 | Status: DISCONTINUED | OUTPATIENT
Start: 2022-02-15 | End: 2022-08-15

## 2022-02-15 RX ORDER — IRON SUCROSE 20 MG/ML
200 INJECTION, SOLUTION INTRAVENOUS ONCE
Refills: 0 | Status: COMPLETED | OUTPATIENT
Start: 2022-02-15 | End: 2022-02-15

## 2022-02-15 RX ORDER — ERYTHROPOIETIN 10000 [IU]/ML
20000 INJECTION, SOLUTION INTRAVENOUS; SUBCUTANEOUS ONCE
Refills: 0 | Status: COMPLETED | OUTPATIENT
Start: 2022-02-15 | End: 2022-02-15

## 2022-02-15 RX ORDER — FUROSEMIDE 40 MG
20 TABLET ORAL ONCE
Refills: 0 | Status: COMPLETED | OUTPATIENT
Start: 2022-02-15 | End: 2022-02-15

## 2022-02-15 RX ORDER — HYDROCORTISONE 20 MG
100 TABLET ORAL ONCE
Refills: 0 | Status: DISCONTINUED | OUTPATIENT
Start: 2022-02-15 | End: 2022-08-15

## 2022-02-15 RX ORDER — ACETAMINOPHEN 500 MG
650 TABLET ORAL ONCE
Refills: 0 | Status: COMPLETED | OUTPATIENT
Start: 2022-02-15 | End: 2022-03-25

## 2022-02-15 RX ADMIN — Medication 20 MILLIGRAM(S): at 12:55

## 2022-02-15 RX ADMIN — ERYTHROPOIETIN 20000 UNIT(S): 10000 INJECTION, SOLUTION INTRAVENOUS; SUBCUTANEOUS at 12:55

## 2022-02-15 RX ADMIN — IRON SUCROSE 220 MILLIGRAM(S): 20 INJECTION, SOLUTION INTRAVENOUS at 12:52

## 2022-02-15 NOTE — ED PROVIDER NOTE - CARE PLAN
1 Principal Discharge DX:	Rectal bleeding  Secondary Diagnosis:	Hemoglobin low  Secondary Diagnosis:	SOB (shortness of breath)

## 2022-02-15 NOTE — ED PROVIDER NOTE - PHYSICAL EXAMINATION
Physical Exam    Vital Signs: I have reviewed the initial vital signs.  Constitutional: (+) pt is obese, appears stated age, no acute distress  Eyes: Conjunctiva pink, Sclera clear, PERRLA, EOMI without pain.    Cardiovascular: S1 and S2, regular rate, regular rhythm, well-perfused extremities, radial pulses equal and 2+ b/l.   Respiratory: unlabored respiratory effort, clear to auscultation bilaterally no wheezing, rales and rhonchi. pt is speaking full sentences. no accessory muscle use.   Gastrointestinal: soft, non-tender, nondistended abdomen, no pulsatile mass, normal bowl sounds, no rebound, no guarding  Rectal: exam shaperoned by pa crow still. (+) black stool, and hemorrhoid. no brbpr. no anal fissures. no palpable masses. after doing rectal exam, hemorrhoid began to mildly bleed.   Musculoskeletal: supple neck, (+) right lower extremity edema, and right calf tenderness  Integumentary: warm, dry, no rash. no ecchymosis, petechiae, or purpura.   Neurologic: awake, alert  Psychiatric: appropriate mood, appropriate affect

## 2022-02-15 NOTE — ED PROVIDER NOTE - ATTENDING CONTRIBUTION TO CARE
76-year-old woman with history of aortic stenosis, HTN, CKD, chronic anemia requiring transfusions sent to ER from [facility] after episode of chest pain and shortness of breath while getting transfusion earlier today.  Patient states she has been feeling chest tightness and shortness of breath for the past few days, was worse today.  Denies cough.  No fever/chills.  No abdominal pain.  Has been having dark stool.  Also complaining of right lower extremity pain and swelling for the past week.  No headache/syncope/near syncope.  Patient has been admitted multiple times previously for anemia/transfusions.  Has history of melena, last colonoscopy was in 2019 which showed diverticulosis, had endoscopy 2020 which showed gastritis and duodenitis.  PE - nad, nc/at, eomi, perrl, op - clear, mmm, neck supple, cta b/l, no w/r/r, rrr, abd- soft, nt/nd, nabs, rectal: + hemorrhoid, + dark stool,  from x 4, + RLE swelling/calf tenderness, A&O x 3, cn 2-12 intact, no focal motor/sensory deficits.   -check labs, cardiac w/u. 76-year-old woman with history of aortic stenosis, HTN, CKD, chronic anemia requiring transfusions sent to ER from [facility] after episode of chest pain and shortness of breath while getting transfusion earlier today.  Patient states she has been feeling chest tightness and shortness of breath for the past few days, was worse today.  Denies cough.  No fever/chills.  No abdominal pain.  Has been having dark stool.  Also complaining of right lower extremity pain and swelling for the past week.  No headache/syncope/near syncope.  Patient has been admitted multiple times previously for anemia/transfusions.  Has history of melena, last colonoscopy was in 2019 which showed diverticulosis, had endoscopy 2020 which showed gastritis and duodenitis.  PE - nad, nc/at, eomi, perrl, op - clear, mmm, neck supple, cta b/l, no w/r/r, rrr, abd- soft, nt/nd, nabs, rectal: + hemorrhoid, + dark stool,  from x 4, + RLE swelling/calf tenderness, A&O x 3, cn 2-12 intact, no focal motor/sensory deficits.   -check labs, cardiac w/u, T&S, transfuse as needed, LE duplex.

## 2022-02-15 NOTE — ED ADULT TRIAGE NOTE - CHIEF COMPLAINT QUOTE
Patient sent in from [facility] for chest pain and shortness of breath x 3d. patient from NH and has been on on 3L via nc 99%.

## 2022-02-15 NOTE — ED PROVIDER NOTE - OBJECTIVE STATEMENT
75 y/o female with a PMH of DARRIN (followed by Dr. Fowler, with hx of iron infusions and blood transfusion), eczema, HTN, CKD, and AS presents to the ED for evaluation of left chest tightness, sob, and rectal bleeding. pt reports she was at the [facility] receiving and infusion when chest tightness and sob developed. pt reports she has not defecated in 5 days, but when she did she saw bright red blood. pt has hx of GI bleed, last colonoscopy/endoscopy was with Dr. Robles and Dr. Hennessy 2 years ago. pt reports right leg swelling for several weeks. pt reports he has been getting blood transfusions more frequently. pt has a blood transfusion on friday and then today. pt denies fever, chills, cough, back pain, abdominal pain, n/v/d/c, blood in the urine, easy bruising, use of blood thinners, recent travel, recent trauma, recent surgeries, hx of cancer, use of hormones, hx of blood clots, weakness, numbness, or tingling.

## 2022-02-15 NOTE — ED PROVIDER NOTE - NS ED ROS FT
CONST: No fever, chills or bodyaches  EYES: No pain, redness, drainage or visual changes.  ENT: No ear pain or discharge, nasal discharge or congestion. No sore throat  CARD: No chest pain, palpitations  RESP: (+) SOB. No cough, hemoptysis. No hx of asthma or COPD  GI: (+) rectal bleeding. No abdominal pain, N/V/D  : No urinary symptoms  MS: No joint pain, back pain or extremity pain/injury  SKIN: No rashes. (+) right leg swelling  NEURO: No headache, dizziness, paresthesias or LOC

## 2022-02-15 NOTE — ED PROVIDER NOTE - PROGRESS NOTE DETAILS
prelim LE duplex neg for DVT Labs reviewed, hgb 6.5 (PRBC transfusion ordered). creat 2.1 (similar to prior).  trop neg. bnp 1191. EKG: SR @ 70 with PAC's, RBBB.  pt admitted for continued treatment and evaluation.

## 2022-02-16 DIAGNOSIS — R09.89 OTHER SPECIFIED SYMPTOMS AND SIGNS INVOLVING THE CIRCULATORY AND RESPIRATORY SYSTEMS: ICD-10-CM

## 2022-02-16 LAB
ALBUMIN SERPL ELPH-MCNC: 3.7 G/DL — SIGNIFICANT CHANGE UP (ref 3.5–5.2)
ALP SERPL-CCNC: 91 U/L — SIGNIFICANT CHANGE UP (ref 30–115)
ALT FLD-CCNC: 7 U/L — SIGNIFICANT CHANGE UP (ref 0–41)
ANION GAP SERPL CALC-SCNC: 10 MMOL/L — SIGNIFICANT CHANGE UP (ref 7–14)
AST SERPL-CCNC: 15 U/L — SIGNIFICANT CHANGE UP (ref 0–41)
BASOPHILS # BLD AUTO: 0 K/UL — SIGNIFICANT CHANGE UP (ref 0–0.2)
BASOPHILS # BLD AUTO: 0.01 K/UL — SIGNIFICANT CHANGE UP (ref 0–0.2)
BASOPHILS NFR BLD AUTO: 0 % — SIGNIFICANT CHANGE UP (ref 0–1)
BASOPHILS NFR BLD AUTO: 0.2 % — SIGNIFICANT CHANGE UP (ref 0–1)
BILIRUB SERPL-MCNC: 1.1 MG/DL — SIGNIFICANT CHANGE UP (ref 0.2–1.2)
BUN SERPL-MCNC: 49 MG/DL — HIGH (ref 10–20)
CALCIUM SERPL-MCNC: 8.8 MG/DL — SIGNIFICANT CHANGE UP (ref 8.5–10.1)
CHLORIDE SERPL-SCNC: 112 MMOL/L — HIGH (ref 98–110)
CO2 SERPL-SCNC: 23 MMOL/L — SIGNIFICANT CHANGE UP (ref 17–32)
CREAT SERPL-MCNC: 2.3 MG/DL — HIGH (ref 0.7–1.5)
EOSINOPHIL # BLD AUTO: 0.01 K/UL — SIGNIFICANT CHANGE UP (ref 0–0.7)
EOSINOPHIL # BLD AUTO: 0.02 K/UL — SIGNIFICANT CHANGE UP (ref 0–0.7)
EOSINOPHIL NFR BLD AUTO: 0.2 % — SIGNIFICANT CHANGE UP (ref 0–8)
EOSINOPHIL NFR BLD AUTO: 0.5 % — SIGNIFICANT CHANGE UP (ref 0–8)
FERRITIN SERPL-MCNC: 32 NG/ML
GLUCOSE SERPL-MCNC: 129 MG/DL — HIGH (ref 70–99)
HCT VFR BLD CALC: 22.3 % — LOW (ref 37–47)
HCT VFR BLD CALC: 24.3 % — LOW (ref 37–47)
HGB BLD-MCNC: 6.9 G/DL — CRITICAL LOW (ref 12–16)
HGB BLD-MCNC: 7.4 G/DL — LOW (ref 12–16)
IMM GRANULOCYTES NFR BLD AUTO: 0.8 % — HIGH (ref 0.1–0.3)
IMM GRANULOCYTES NFR BLD AUTO: 0.9 % — HIGH (ref 0.1–0.3)
LYMPHOCYTES # BLD AUTO: 0.3 K/UL — LOW (ref 1.2–3.4)
LYMPHOCYTES # BLD AUTO: 0.6 K/UL — LOW (ref 1.2–3.4)
LYMPHOCYTES # BLD AUTO: 12 % — LOW (ref 20.5–51.1)
LYMPHOCYTES # BLD AUTO: 6.8 % — LOW (ref 20.5–51.1)
MAGNESIUM SERPL-MCNC: 2.5 MG/DL — HIGH (ref 1.8–2.4)
MCHC RBC-ENTMCNC: 28.7 PG — SIGNIFICANT CHANGE UP (ref 27–31)
MCHC RBC-ENTMCNC: 28.8 PG — SIGNIFICANT CHANGE UP (ref 27–31)
MCHC RBC-ENTMCNC: 30.5 G/DL — LOW (ref 32–37)
MCHC RBC-ENTMCNC: 30.9 G/DL — LOW (ref 32–37)
MCV RBC AUTO: 92.9 FL — SIGNIFICANT CHANGE UP (ref 81–99)
MCV RBC AUTO: 94.2 FL — SIGNIFICANT CHANGE UP (ref 81–99)
MONOCYTES # BLD AUTO: 0.07 K/UL — LOW (ref 0.1–0.6)
MONOCYTES # BLD AUTO: 0.29 K/UL — SIGNIFICANT CHANGE UP (ref 0.1–0.6)
MONOCYTES NFR BLD AUTO: 1.6 % — LOW (ref 1.7–9.3)
MONOCYTES NFR BLD AUTO: 5.8 % — SIGNIFICANT CHANGE UP (ref 1.7–9.3)
NEUTROPHILS # BLD AUTO: 3.96 K/UL — SIGNIFICANT CHANGE UP (ref 1.4–6.5)
NEUTROPHILS # BLD AUTO: 4.04 K/UL — SIGNIFICANT CHANGE UP (ref 1.4–6.5)
NEUTROPHILS NFR BLD AUTO: 81 % — HIGH (ref 42.2–75.2)
NEUTROPHILS NFR BLD AUTO: 90.2 % — HIGH (ref 42.2–75.2)
NRBC # BLD: 0 /100 WBCS — SIGNIFICANT CHANGE UP (ref 0–0)
NRBC # BLD: 0 /100 WBCS — SIGNIFICANT CHANGE UP (ref 0–0)
PLATELET # BLD AUTO: 103 K/UL — LOW (ref 130–400)
PLATELET # BLD AUTO: 111 K/UL — LOW (ref 130–400)
POTASSIUM SERPL-MCNC: 5.5 MMOL/L — HIGH (ref 3.5–5)
POTASSIUM SERPL-SCNC: 5.5 MMOL/L — HIGH (ref 3.5–5)
PROT SERPL-MCNC: 6.8 G/DL — SIGNIFICANT CHANGE UP (ref 6–8)
RBC # BLD: 2.4 M/UL — LOW (ref 4.2–5.4)
RBC # BLD: 2.58 M/UL — LOW (ref 4.2–5.4)
RBC # FLD: 17.3 % — HIGH (ref 11.5–14.5)
RBC # FLD: 17.4 % — HIGH (ref 11.5–14.5)
SODIUM SERPL-SCNC: 145 MMOL/L — SIGNIFICANT CHANGE UP (ref 135–146)
TROPONIN T SERPL-MCNC: <0.01 NG/ML — SIGNIFICANT CHANGE UP
WBC # BLD: 4.39 K/UL — LOW (ref 4.8–10.8)
WBC # BLD: 4.99 K/UL — SIGNIFICANT CHANGE UP (ref 4.8–10.8)
WBC # FLD AUTO: 4.39 K/UL — LOW (ref 4.8–10.8)
WBC # FLD AUTO: 4.99 K/UL — SIGNIFICANT CHANGE UP (ref 4.8–10.8)

## 2022-02-16 PROCEDURE — 76705 ECHO EXAM OF ABDOMEN: CPT | Mod: 26

## 2022-02-16 PROCEDURE — 99223 1ST HOSP IP/OBS HIGH 75: CPT

## 2022-02-16 PROCEDURE — 93010 ELECTROCARDIOGRAM REPORT: CPT

## 2022-02-16 RX ORDER — SENNA PLUS 8.6 MG/1
2 TABLET ORAL AT BEDTIME
Refills: 0 | Status: DISCONTINUED | OUTPATIENT
Start: 2022-02-16 | End: 2022-02-23

## 2022-02-16 RX ORDER — DIPHENHYDRAMINE HCL 50 MG
50 CAPSULE ORAL ONCE
Refills: 0 | Status: COMPLETED | OUTPATIENT
Start: 2022-02-16 | End: 2022-02-16

## 2022-02-16 RX ORDER — SODIUM ZIRCONIUM CYCLOSILICATE 10 G/10G
10 POWDER, FOR SUSPENSION ORAL ONCE
Refills: 0 | Status: COMPLETED | OUTPATIENT
Start: 2022-02-16 | End: 2022-02-16

## 2022-02-16 RX ORDER — FUROSEMIDE 40 MG
40 TABLET ORAL DAILY
Refills: 0 | Status: DISCONTINUED | OUTPATIENT
Start: 2022-02-16 | End: 2022-02-16

## 2022-02-16 RX ORDER — POLYETHYLENE GLYCOL 3350 17 G/17G
17 POWDER, FOR SOLUTION ORAL DAILY
Refills: 0 | Status: DISCONTINUED | OUTPATIENT
Start: 2022-02-16 | End: 2022-02-23

## 2022-02-16 RX ORDER — FUROSEMIDE 40 MG
20 TABLET ORAL ONCE
Refills: 0 | Status: DISCONTINUED | OUTPATIENT
Start: 2022-02-16 | End: 2022-02-16

## 2022-02-16 RX ORDER — METOPROLOL TARTRATE 50 MG
25 TABLET ORAL
Refills: 0 | Status: DISCONTINUED | OUTPATIENT
Start: 2022-02-16 | End: 2022-02-23

## 2022-02-16 RX ORDER — PANTOPRAZOLE SODIUM 20 MG/1
40 TABLET, DELAYED RELEASE ORAL ONCE
Refills: 0 | Status: COMPLETED | OUTPATIENT
Start: 2022-02-16 | End: 2022-02-16

## 2022-02-16 RX ORDER — PANTOPRAZOLE SODIUM 20 MG/1
40 TABLET, DELAYED RELEASE ORAL
Refills: 0 | Status: DISCONTINUED | OUTPATIENT
Start: 2022-02-16 | End: 2022-02-19

## 2022-02-16 RX ORDER — FUROSEMIDE 40 MG
40 TABLET ORAL ONCE
Refills: 0 | Status: COMPLETED | OUTPATIENT
Start: 2022-02-16 | End: 2022-02-16

## 2022-02-16 RX ORDER — FUROSEMIDE 40 MG
40 TABLET ORAL ONCE
Refills: 0 | Status: DISCONTINUED | OUTPATIENT
Start: 2022-02-16 | End: 2022-02-16

## 2022-02-16 RX ORDER — SODIUM ZIRCONIUM CYCLOSILICATE 10 G/10G
10 POWDER, FOR SUSPENSION ORAL DAILY
Refills: 0 | Status: COMPLETED | OUTPATIENT
Start: 2022-02-16 | End: 2022-02-17

## 2022-02-16 RX ORDER — SOD SULF/SODIUM/NAHCO3/KCL/PEG
4000 SOLUTION, RECONSTITUTED, ORAL ORAL ONCE
Refills: 0 | Status: COMPLETED | OUTPATIENT
Start: 2022-02-16 | End: 2022-02-16

## 2022-02-16 RX ORDER — LANOLIN ALCOHOL/MO/W.PET/CERES
3 CREAM (GRAM) TOPICAL AT BEDTIME
Refills: 0 | Status: DISCONTINUED | OUTPATIENT
Start: 2022-02-16 | End: 2022-02-23

## 2022-02-16 RX ADMIN — Medication 4000 MILLILITER(S): at 18:21

## 2022-02-16 RX ADMIN — PANTOPRAZOLE SODIUM 40 MILLIGRAM(S): 20 TABLET, DELAYED RELEASE ORAL at 07:00

## 2022-02-16 RX ADMIN — Medication 50 MILLIGRAM(S): at 23:08

## 2022-02-16 RX ADMIN — SENNA PLUS 2 TABLET(S): 8.6 TABLET ORAL at 23:28

## 2022-02-16 RX ADMIN — Medication 50 MILLIGRAM(S): at 01:39

## 2022-02-16 RX ADMIN — Medication 40 MILLIGRAM(S): at 23:28

## 2022-02-16 RX ADMIN — Medication 25 MILLIGRAM(S): at 07:00

## 2022-02-16 RX ADMIN — Medication 40 MILLIGRAM(S): at 14:47

## 2022-02-16 RX ADMIN — PANTOPRAZOLE SODIUM 40 MILLIGRAM(S): 20 TABLET, DELAYED RELEASE ORAL at 18:16

## 2022-02-16 RX ADMIN — SODIUM ZIRCONIUM CYCLOSILICATE 10 GRAM(S): 10 POWDER, FOR SUSPENSION ORAL at 18:15

## 2022-02-16 RX ADMIN — Medication 40 MILLIGRAM(S): at 07:00

## 2022-02-16 RX ADMIN — Medication 25 MILLIGRAM(S): at 18:22

## 2022-02-16 RX ADMIN — Medication 104 MILLIGRAM(S): at 01:38

## 2022-02-16 NOTE — ED ADULT NURSE NOTE - CHIEF COMPLAINT QUOTE
Patient sent in from [facility] for chest pain and shortness of breath x 3d. patient from NH and has been on on 3L via nc 99%.

## 2022-02-16 NOTE — H&P ADULT - NSHPPHYSICALEXAM_GEN_ALL_CORE
General: NAD, lying in bed comfortably  Mental status: AAOx3  Eyes: EOMI, conjunctiva and sclera clear  ENT: Moist mucous membranes  Neck: No JVD  Chest/lung: b/l basilar crackles, decreased breath sounds  Heart: Regular rate and rhythm; Harsh holosystolic murmur  Abdomen: soft, distended, tender periumbilical and RLQ, Bowel sounds present  Extremities:  Right calf swelling and redness, b/l LE 1+ pitting edema to knees, 2+ Peripheral Pulses

## 2022-02-16 NOTE — CHART NOTE - NSCHARTNOTEFT_GEN_A_CORE
76 y F with PMH of severe aortic stenosis, transfusion-dependent anemia (sees Dr. Gaston, thought to be 2/2 CKD + obscure GI bleeding), HTN, morbid obesity, CKD presents with chest pain and SOB while getting a blood transfusion.    ICU Vital Signs Last 24 Hrs  T(C): 35.6 (16 Feb 2022 03:02), Max: 36.4 (15 Feb 2022 17:00)  T(F): 96 (16 Feb 2022 03:02), Max: 97.6 (15 Feb 2022 17:00)  HR: 74 (16 Feb 2022 03:02) (70 - 77)  BP: 133/59 (16 Feb 2022 03:02) (127/58 - 146/66)  BP(mean): --  ABP: --  ABP(mean): --  RR: 20 (16 Feb 2022 03:02) (16 - 20)  SpO2: 98% (16 Feb 2022 03:02) (97% - 100%)    Gen-elderly F, NAD, non toxic  Eyes- anicteric sclera non injected conjunctiav        LABS:                        7.4    4.39  )-----------( 103      ( 16 Feb 2022 05:43 )             24.3     02-16    145  |  112<H>  |  49<H>  ----------------------------<  129<H>  5.5<H>   |  23  |  2.3<H>    Ca    8.8      16 Feb 2022 05:43  Mg     2.5     02-16    TPro  6.8  /  Alb  3.7  /  TBili  1.1  /  DBili  x   /  AST  15  /  ALT  7   /  AlkPhos  91  02-16  PT/INR - ( 15 Feb 2022 21:13 )   PT: 12.30 sec;   INR: 1.07 ratio    PTT - ( 15 Feb 2022 21:13 )  PTT:35.5 sec 76 y F with PMH of severe aortic stenosis, transfusion-dependent anemia (sees Dr. Gaston, thought to be 2/2 CKD + obscure GI bleeding), HTN, morbid obesity, CKD presents with chest pain and SOB while getting a blood transfusion.    ICU Vital Signs Last 24 Hrs  T(C): 35.6 (16 Feb 2022 03:02), Max: 36.4 (15 Feb 2022 17:00)  T(F): 96 (16 Feb 2022 03:02), Max: 97.6 (15 Feb 2022 17:00)  HR: 74 (16 Feb 2022 03:02) (70 - 77)  BP: 133/59 (16 Feb 2022 03:02) (127/58 - 146/66)  BP(mean): --  ABP: --  ABP(mean): --  RR: 20 (16 Feb 2022 03:02) (16 - 20)  SpO2: 98% (16 Feb 2022 03:02) (97% - 100%)    Gen-elderly F, NAD, non toxic  Eyes- anicteric sclera non injected conjunctiva  Chest- symmetrical chest rise, no accessory muscle use  Cardiac- RRR, normal s1s2, no RMG appreciated  Abdominal- non distended, soft, non ttp, nabs+  Ext- no clubbing or cyanosis        LABS:                        7.4    4.39  )-----------( 103      ( 16 Feb 2022 05:43 )             24.3     02-16    145  |  112<H>  |  49<H>  ----------------------------<  129<H>  5.5<H>   |  23  |  2.3<H>    Ca    8.8      16 Feb 2022 05:43  Mg     2.5     02-16    TPro  6.8  /  Alb  3.7  /  TBili  1.1  /  DBili  x   /  AST  15  /  ALT  7   /  AlkPhos  91  02-16  PT/INR - ( 15 Feb 2022 21:13 )   PT: 12.30 sec;   INR: 1.07 ratio    PTT - ( 15 Feb 2022 21:13 )  PTT:35.5 sec    MEDICATIONS  (STANDING):  furosemide   Injectable 20 milliGRAM(s) IV Push once  furosemide   Injectable 40 milliGRAM(s) IV Push daily  metoprolol tartrate 25 milliGRAM(s) Oral two times a day  pantoprazole  Injectable 40 milliGRAM(s) IV Push two times a day  polyethylene glycol 3350 17 Gram(s) Oral daily  senna 2 Tablet(s) Oral at bedtime    MEDICATIONS  (PRN):  aluminum hydroxide/magnesium hydroxide/simethicone Suspension 30 milliLiter(s) Oral every 4 hours PRN Dyspepsia  melatonin 3 milliGRAM(s) Oral at bedtime PRN Insomnia      #Chronic DARRIN, pancytopenia  #Melena and hematochezia  - Patient receives Retacrit 2000 U weekly and blood transfusions every 1-2 weeks, most recently today  - EGD and colonoscopy in 2020 showed gastritis, diverticulosis, and hemorrhoids   - Hgb 6.5 on admission, s/p 1u pRBC-->7.4  - GI consult--->no indication for endoscopy evaluation at this time    #Chest pain likely 2/2 symptomatic anemia  - Troponin negative x1; currently pain-free  - EKG NSR, RBBB, no ST changes  - Trend troponin    #Chronic diastolic heart failure  #Severe AS  #Shortness of breath  - TTE 9/2021: EF 65%, grade II DD, severe AS, mod MR, mild TR  - Volume overloaded on exam  - bibasilar opacities on CXR- improved on serial imaging  - s/p Lasix IV 40 mg with pRBC transfusion  - repeat lasix IV 20mg x1    #RLE swelling; r/o DVT  - f/u [facility] duplex    #Periumbilical abdominal pain  - Last BM 4 days ago  - Abdominal US  - Miralax, senna    #CKD  - At baseline    #HTN  - Resume home metoprolol    DVT Prophylaxis: SCDs  GI Prophylaxis: Protonix IV  Diet: Clear liquids  Activity: AAT    #Progress Note Handoff  Pending (specify):  clinical improvement, repeat CBC, dupplex f/up  Family discussion: patient updated and in agreement of plan  Disposition: anticipate in the next 24-48hrs

## 2022-02-16 NOTE — H&P ADULT - NSHPLABSRESULTS_GEN_ALL_CORE
LABS:  cret                        6.5    3.74  )-----------( 95       ( 15 Feb 2022 19:09 )             21.9     02-15    141  |  112<H>  |  45<H>  ----------------------------<  113<H>  5.5<H>   |  20  |  2.1<H>    Ca    7.6<L>      15 Feb 2022 19:09    TPro  5.9<L>  /  Alb  3.3<L>  /  TBili  1.1  /  DBili  x   /  AST  24  /  ALT  7   /  AlkPhos  81  02-15    PT/INR - ( 15 Feb 2022 21:13 )   PT: 12.30 sec;   INR: 1.07 ratio         PTT - ( 15 Feb 2022 21:13 )  PTT:35.5 sec

## 2022-02-16 NOTE — ED ADULT NURSE REASSESSMENT NOTE - ANCILLARY STATUS
physician at bedside
Ears: no ear pain and no hearing problems.Nose: no nasal congestion and no nasal drainage.Mouth/Throat: no dysphagia, no hoarseness and no throat pain.Neck: no lumps, no pain, no stiffness and no swollen glands.

## 2022-02-16 NOTE — H&P ADULT - HISTORY OF PRESENT ILLNESS
76 y F with PMH of severe aortic stenosis, transfusion-dependent anemia (sees Dr. Gaston, thought to be 2/2 CKD + obscure GI bleeding), HTN, morbid obesity, CKD presents with chest pain and SOB while getting a blood transfusion today; also c/o blood in stool, black stools, R leg swelling, abdominal pain. Chest pain is substernal and left sided, tightness, not related to exertion (but she has not exerted herself), started after receiving 1 unit pRBCs today, associated with some mild SOB. She reports Right leg pain and swelling but unsure for how long. Also c/o periumbilical pain, last BM 4 days ago. She denies palpitations, fever, cough, n/v, hematemesis, dysuria.    ED VS: T(F): , Max: 97.6  HR:  (71 - 72)  BP:  (129/63 - 146/66)  RR: 18  SpO2:  (97% - 99%)    Labs: Hgb 6.5, trop neg x1, proBNP 1191  Imaging:  Pt received   Pt is admitted for workup/management   76 y F with PMH of severe aortic stenosis, transfusion-dependent anemia (sees Dr. Gaston, thought to be 2/2 CKD + obscure GI bleeding), HTN, morbid obesity, CKD presents with chest pain and SOB while getting a blood transfusion today; also c/o blood in stool, black stools, R leg swelling, abdominal pain. Chest pain is substernal and left sided, tightness, not related to exertion (but she has not exerted herself), started after receiving 1 unit pRBCs today, associated with some mild SOB. She reports Right leg pain and swelling but unsure for how long. Also c/o periumbilical pain, last BM 4 days ago. She denies palpitations, fever, cough, n/v, hematemesis, dysuria.    ED VS: T(F): , Max: 97.6  HR:  (71 - 72)  BP:  (129/63 - 146/66)  RR: 18  SpO2:  (97% - 99%)    Labs: Hgb 6.5, trop neg x1, proBNP 1191  Imaging: RLE duplex pending  1 unit pRBC ordered    Pt is admitted for chest pain, symptomatic anemia   76 y F with PMH of severe aortic stenosis, transfusion-dependent anemia (sees Dr. Gaston, thought to be 2/2 CKD + obscure GI bleeding), HTN, morbid obesity, CKD presents with chest pain and SOB while getting a blood transfusion today; also c/o blood in stool, black stools, R leg swelling, abdominal pain. Chest pain is substernal and left sided, tightness, not related to exertion (but she has not exerted herself), started after receiving 1 unit pRBCs today, associated with some mild SOB. She reports Right leg pain and swelling but unsure for how long. Also c/o periumbilical pain, last BM 4 days ago. She denies palpitations, fever, cough, n/v, hematemesis, dysuria.    ED VS: T(F): , Max: 97.6  HR:  (71 - 72)  BP:  (129/63 - 146/66)  RR: 18  SpO2:  (97% - 99%)    Labs: Hgb 6.5, trop neg x1, proBNP 1191  Imaging: RLE duplex pending  OLEKSANDR positive in ED. 1 unit pRBC ordered    Pt is admitted for chest pain, symptomatic anemia

## 2022-02-16 NOTE — H&P ADULT - NSICDXPASTMEDICALHX_GEN_ALL_CORE_FT
PAST MEDICAL HISTORY:  Anemia     Aortic stenosis     Eczema     Heart murmur     HTN (hypertension)

## 2022-02-16 NOTE — HISTORY OF PRESENT ILLNESS
[de-identified] : bruce is a 74 year old white female with hypertension, chronic kidney disease, morbidly obese presents today with normocytic anemia. \par Her most recent CBC 04/01/2020 shows WBC -5.66, HB of 6.7, MCV -86.3, RDW - 17.3, platelet count- 196. Her Hb was normal until 08/2019. In October 2019 she noticed black tarry stools and was feeling tired. She went to ER and her Hb was 5.3. She was given 3 units of PRBC. Her iron studies at that showed ferritin of 8 and percent saturation of 5. She had EGD and colonoscopy which did not reveal any bleeding lesions. Following that event as per patient she was not given iron (?not sure why). She was readmitted to hospital in 01/2020 for SOB on exertion and her HB was noted to 6.0 again. She was given 2 units and her iron studies were consistent with DARRIN. B12 and folate were normal. Immunofixation showed weak IgG lambda migrating para protein. Free light chain ratio 2.1. Normal calcium. \par EGD and VCE was done. EGD revealed gastritis and VCE showed bleeding in small bowel. \par She was started on oral iron and she took for about three months. \par Her latest ferritin done in feb 2020 was 24 and percent saturation was 72% and her hb improved to 8.8. She also has push enteroscopy in 02/2020 and no bleeding lesions were found. Was recommended to have repeat colonoscopy and double balloon enteroscopy if she bleeds again.\par She went for blood work again on 04/01/2020 as she was feeling weak and having SOB and her hb dropped to 6.7. She was told by PMD to start on PROCRIT 10,000 units M-W-F. She was told her iron levels are good and she can stop iron. \par \par Today she feels very tired and her SOB is worse. She denies any melena or BRBPR in last few days. She does have anal fissure and hemorrhoids and bleeds when she is constipated. She denies hematuria or post menopausal bleeding. Denies weight loss. Does not take any NSAIDs or aspirin. She has not followed up with nephrology before.\par Family history is significant for breast cancer in her mother. She is a former smoker stopped 20 years ago.\par She is uptodate with mammogram and Pap smear. \par  [de-identified] : 09/17/2020 Patient returns for follow up , she feels better after venofer X 4 and on EPO 10,000 weekly , Hb is up to 10 . \par \par 10/15/2020 Patient returns for follow up 2 weeks after last dose of EPO , today's Hb is 10.7 . she offers no new complaints . \par \par 04/08/2021 Patient returns for follow up for anemia on EPO and iron replacement . Hb is 10.8 . she complains of mild left arm pain after she started to self check her BP . ahe meds were recently adjusted by her PMD . \par \par 08/31/2021 patient returns for follow up , she offers no new complaints , her Hgb is slowly trending down after last transfusion and venofer X1 for ferritin : 35 . \par \par 11/18/2021 patient returns for follow up complaining of weakness and dyspnea on minimal exertion , still with lower extremity edema R > L . Hgb is 7.1 2 weeks after transfusion and despite venofer and EPO . She denies melena or hematochezia . Of note she had suspected small bowel bleeding on enteroscopy or capsule endoscopy and was intolerant to push enteroscopy ( hypotension ? ) \par \par \par 12/2/21: patient returns for follow up for DARRIN secondary  obscure GI bleeding  and CKD. \par She is feeling better today. We will continue  Venofer infusion weekly X 3 and Procrit.\par Close monitor CBC on weekly basis with possible blood transfusion if required. \par Patient reports feeling better, she denies CP, SOB. She is sometimes experienced  hematochezia secondary to chronica anal fissure and constipation.\par \par 2/14/22- Patient is here for follow up visit for management of DARRIN. Hgb is 6.7  3 days only after last transfusion , she reports several episodes of bleeding presumably from hemorrhoids and is using topical medications , Ferritin levels remain low despite venofer . She complains of weakness, bilateral leg edema . no chest pain or SOB . \par

## 2022-02-16 NOTE — CONSULT NOTE ADULT - SUBJECTIVE AND OBJECTIVE BOX
Gastroenterology Consultation:    Patient is a 76y old  Female who presents with a chief complaint of Chest pain, anemia, melena (16 Feb 2022 00:47)      Admitted on: 02-16-22  HPI:  76 y F with PMH of severe aortic stenosis, transfusion-dependent anemia (sees Dr. Gaston, thought to be 2/2 CKD + obscure GI bleeding), HTN, morbid obesity, CKD presents with chest pain and SOB while getting a blood transfusion today; also c/o blood in stool, black stools, R leg swelling, abdominal pain. Chest pain is substernal and left sided, tightness, not related to exertion (but she has not exerted herself), started after receiving 1 unit pRBCs today, associated with some mild SOB. She reports Right leg pain and swelling but unsure for how long. Also c/o periumbilical pain, last BM 4 days ago. She denies palpitations, fever, cough, n/v, hematemesis, dysuria.    ED VS: T(F): , Max: 97.6  HR:  (71 - 72)  BP:  (129/63 - 146/66)  RR: 18  SpO2:  (97% - 99%)    Labs: Hgb 6.5, trop neg x1, proBNP 1191  Imaging: RLE duplex pending  OLEKSANDR positive in ED. 1 unit pRBC ordered    Pt is admitted for chest pain, symptomatic anemia   (16 Feb 2022 00:47)      Prior EGD:  Prior Colonoscopy:      PAST MEDICAL & SURGICAL HISTORY:  HTN (hypertension)    Heart murmur    Eczema    Anemia    Aortic stenosis    H/O appendicitis    H/O cholecystitis        FAMILY HISTORY:  FH: kidney disease (Father)        Social History:  Tobacco:  Alcohol:  Drugs:    Home Medications:  aluminum hydroxide-magnesium hydroxide 200 mg-200 mg/5 mL oral suspension: 30 milliliter(s) orally every 4 hours, As needed, Dyspepsia (24 Jan 2022 12:15)  heparin: 5000 unit(s) subcutaneous 3 times a day (24 Jan 2022 12:15)  melatonin 3 mg oral tablet: 1 tab(s) orally once a day (at bedtime), As needed, Insomnia (24 Jan 2022 12:15)  Metoprolol Tartrate 25 mg oral tablet: 1 tab(s) orally 2 times a day (18 Jan 2022 00:11)  pantoprazole 40 mg oral delayed release tablet: 1 tab(s) orally once a day (before a meal) (24 Jan 2022 12:15)    MEDICATIONS  (STANDING):  furosemide   Injectable 20 milliGRAM(s) IV Push once  furosemide   Injectable 40 milliGRAM(s) IV Push daily  metoprolol tartrate 25 milliGRAM(s) Oral two times a day  pantoprazole  Injectable 40 milliGRAM(s) IV Push two times a day  polyethylene glycol 3350 17 Gram(s) Oral daily  senna 2 Tablet(s) Oral at bedtime    MEDICATIONS  (PRN):  aluminum hydroxide/magnesium hydroxide/simethicone Suspension 30 milliLiter(s) Oral every 4 hours PRN Dyspepsia  melatonin 3 milliGRAM(s) Oral at bedtime PRN Insomnia      Allergies  aspirin (Rash)  latex (Unknown)  penicillins (Unknown)      Review of Systems:   Constitutional:  No Fever, No Chills  ENT/Mouth:  No Hearing Changes,  No Difficulty Swallowing  Eyes:  No Eye Pain, No Vision Changes  Cardiovascular:  No Chest Pain, No Palpitations  Respiratory:  No Cough, No Dyspnea  Gastrointestinal:  As described in HPI  Musculoskeletal:  No Joint Swelling, No Back Pain  Skin:  No Skin Lesions, No Jaundice  Neuro:  No Syncope, No Dizziness  Heme/Lymph:  No Bruising, No Bleeding.          Physical Examination:  T(C): 35.6 (02-16-22 @ 03:02), Max: 36.4 (02-15-22 @ 17:00)  HR: 74 (02-16-22 @ 03:02) (70 - 77)  BP: 133/59 (02-16-22 @ 03:02) (127/58 - 146/66)  RR: 20 (02-16-22 @ 03:02) (16 - 20)  SpO2: 98% (02-16-22 @ 03:02) (97% - 100%)  Height (cm): 170.2 (02-15-22 @ 17:00)  Weight (kg): 113.4 (02-15-22 @ 17:00)      Constitutional: No acute distress.  Eyes:. Conjunctivae are clear, Sclera is non-icteric.  Ears Nose and Throat: The external ears are normal appearing,  Oral mucosa is pink and moist.  Respiratory:  No signs of respiratory distress. Lung sounds are clear bilaterally.  Cardiovascular:  S1 S2, Regular rate and rhythm.  GI: Abdomen is soft, symmetric, and non-tender without distention. There are no visible lesions or scars. Bowel sounds are present and normoactive in all four quadrants. No masses, hepatomegaly, or splenomegaly are noted.   Neuro: No Tremor, No involuntary movements  Skin: No rashes, No Jaundice.          Data:                        7.4    4.39  )-----------( 103      ( 16 Feb 2022 05:43 )             24.3     Hgb Trend:  7.4  02-16-22 @ 05:43  6.5  02-15-22 @ 19:09      02-16    145  |  112<H>  |  49<H>  ----------------------------<  129<H>  5.5<H>   |  23  |  2.3<H>    Ca    8.8      16 Feb 2022 05:43  Mg     2.5     02-16    TPro  6.8  /  Alb  3.7  /  TBili  1.1  /  DBili  x   /  AST  15  /  ALT  7   /  AlkPhos  91  02-16    Liver panel trend:  TBili 1.1   /   AST 15   /   ALT 7   /   AlkP 91   /   Tptn 6.8   /   Alb 3.7    /   DBili --      02-16  TBili 1.1   /   AST 24   /   ALT 7   /   AlkP 81   /   Tptn 5.9   /   Alb 3.3    /   DBili --      02-15      PT/INR - ( 15 Feb 2022 21:13 )   PT: 12.30 sec;   INR: 1.07 ratio         PTT - ( 15 Feb 2022 21:13 )  PTT:35.5 sec        Radiology:    US Abdomen Upper Quadrant Right:   ACC: [id number] EXAM:  US ABDOMEN RT UPR QUADRANT                          PROCEDURE DATE:  02/16/2022          INTERPRETATION:  CLINICAL INFORMATION: Abdominal pain    COMPARISON: CT abdomen pelvis dated 1/17/2022    TECHNIQUE: Sonography of the right upper quadrant.    FINDINGS:    Liver: Imaged portions appear unremarkable  Bile ducts: Common bile duct measures 4 mm, nondilated.  Gallbladder: Cholecystectomy.  Pancreas: Visualized portions are within normal limits.  Right kidney: 10.1 cm. No hydronephrosis.  Ascites: None.    IMPRESSION:    Status post cholecystectomy. Nondilated CBD.    --- End of Report ---            GUTIERREZ GRIGGS MD; Attending Radiologist  This document has been electronically signed. Feb 16 2022  8:33AM (02-16-22 @ 08:26)     Gastroenterology Consultation:    Patient is a 76y old  Female who presents with a chief complaint of Chest pain, anemia, melena (16 Feb 2022 00:47)      Admitted on: 02-16-22  HPI:  76 y F with PMH of severe aortic stenosis, transfusion-dependent iron deficiency anemia (sees Dr. Gaston, thought to be 2/2 CKD + obscure GI bleeding), HTN, morbid obesity, CKD presents with chest pain and SOB while getting a blood transfusion on monday; also c/o BRBPR on toilet paper, black stools solid, R leg swelling, abdominal pain. Chest pain is substernal and left sided, tightness, not related to exertion (but she has not exerted herself), started after receiving 1 unit pRBCs today, associated with some mild SOB. She reports Right leg pain and swelling but unsure for how long. Also c/o periumbilical pain, last BM 4 days ago. She denies palpitations, fever, cough, n/v, hematemesis, dysuria.    ED VS: T(F): , Max: 97.6  HR:  (71 - 72)  BP:  (129/63 - 146/66)  RR: 18  SpO2:  (97% - 99%)    Labs: Hgb 6.5, trop neg x1, proBNP 1191  Imaging: RLE duplex pending  OLEKSANDR positive in ED. 1 unit pRBC ordered    Pt is admitted for chest pain, symptomatic anemia   (16 Feb 2022 00:47)      Prior EGD: < from: EGD (01.09.20 @ 11:00) >  Impressions:    Normal mucosa in the whole esophagus.    Erythema, congestion and erosive in the antrum. (Biopsy).    Erythema in the duodenum compatible with duodenitis.    -prominent brunners glands.     < end of copied text >    Prior Colonoscopy: < from: Colonoscopy (10.22.19 @ 14:00) >           Impressions:    Moderate diverticulosis of the sigmoid colon, descending colon, cecum, distal  descending colon and ascending colon.    Grade 2 internal hemorrhoids.    Solid stool in some spots throughout colon.     < end of copied text >    < from: EGD (10.21.19 @ 09:15) >  Impressions:    Esophagitis compatible with non-erosive esophagitis.    Erythema and congestion in the stomach.    Erythema and congestion in the duodenum compatible with duodenitis.     < end of copied text >        PAST MEDICAL & SURGICAL HISTORY:  HTN (hypertension)    Heart murmur    Eczema    Anemia    Aortic stenosis    H/O appendicitis    H/O cholecystitis        FAMILY HISTORY:  FH: kidney disease (Father)        Social History:  Tobacco: N  Alcohol: N  Drugs: N    Home Medications:  aluminum hydroxide-magnesium hydroxide 200 mg-200 mg/5 mL oral suspension: 30 milliliter(s) orally every 4 hours, As needed, Dyspepsia (24 Jan 2022 12:15)  heparin: 5000 unit(s) subcutaneous 3 times a day (24 Jan 2022 12:15)  melatonin 3 mg oral tablet: 1 tab(s) orally once a day (at bedtime), As needed, Insomnia (24 Jan 2022 12:15)  Metoprolol Tartrate 25 mg oral tablet: 1 tab(s) orally 2 times a day (18 Jan 2022 00:11)  pantoprazole 40 mg oral delayed release tablet: 1 tab(s) orally once a day (before a meal) (24 Jan 2022 12:15)    MEDICATIONS  (STANDING):  furosemide   Injectable 20 milliGRAM(s) IV Push once  furosemide   Injectable 40 milliGRAM(s) IV Push daily  metoprolol tartrate 25 milliGRAM(s) Oral two times a day  pantoprazole  Injectable 40 milliGRAM(s) IV Push two times a day  polyethylene glycol 3350 17 Gram(s) Oral daily  senna 2 Tablet(s) Oral at bedtime    MEDICATIONS  (PRN):  aluminum hydroxide/magnesium hydroxide/simethicone Suspension 30 milliLiter(s) Oral every 4 hours PRN Dyspepsia  melatonin 3 milliGRAM(s) Oral at bedtime PRN Insomnia      Allergies  aspirin (Rash)  latex (Unknown)  penicillins (Unknown)      Review of Systems:   Constitutional:  No Fever, No Chills  ENT/Mouth:  No Hearing Changes,  No Difficulty Swallowing  Eyes:  No Eye Pain, No Vision Changes  Cardiovascular:  No Chest Pain, No Palpitations  Respiratory:  No Cough, No Dyspnea  Gastrointestinal:  As described in HPI  Musculoskeletal:  No Joint Swelling, No Back Pain  Skin:  No Skin Lesions, No Jaundice  Neuro:  No Syncope, No Dizziness  Heme/Lymph:  No Bruising, No Bleeding.          Physical Examination:  T(C): 35.6 (02-16-22 @ 03:02), Max: 36.4 (02-15-22 @ 17:00)  HR: 74 (02-16-22 @ 03:02) (70 - 77)  BP: 133/59 (02-16-22 @ 03:02) (127/58 - 146/66)  RR: 20 (02-16-22 @ 03:02) (16 - 20)  SpO2: 98% (02-16-22 @ 03:02) (97% - 100%)  Height (cm): 170.2 (02-15-22 @ 17:00)  Weight (kg): 113.4 (02-15-22 @ 17:00)      Constitutional: No acute distress.  Eyes:. Conjunctivae are clear, Sclera is non-icteric.  Ears Nose and Throat: The external ears are normal appearing,  Oral mucosa is pink and moist.  Respiratory:  No signs of respiratory distress. Lung sounds are clear bilaterally.  Cardiovascular:  S1 S2, Regular rate and rhythm.  GI: Abdomen is soft, symmetric, and non-tender without distention   Neuro: No Tremor, No involuntary movements  Skin: No rashes, No Jaundice.          Data:                        7.4    4.39  )-----------( 103      ( 16 Feb 2022 05:43 )             24.3     Hgb Trend:  7.4  02-16-22 @ 05:43  6.5  02-15-22 @ 19:09      02-16    145  |  112<H>  |  49<H>  ----------------------------<  129<H>  5.5<H>   |  23  |  2.3<H>    Ca    8.8      16 Feb 2022 05:43  Mg     2.5     02-16    TPro  6.8  /  Alb  3.7  /  TBili  1.1  /  DBili  x   /  AST  15  /  ALT  7   /  AlkPhos  91  02-16    Liver panel trend:  TBili 1.1   /   AST 15   /   ALT 7   /   AlkP 91   /   Tptn 6.8   /   Alb 3.7    /   DBili --      02-16  TBili 1.1   /   AST 24   /   ALT 7   /   AlkP 81   /   Tptn 5.9   /   Alb 3.3    /   DBili --      02-15      PT/INR - ( 15 Feb 2022 21:13 )   PT: 12.30 sec;   INR: 1.07 ratio         PTT - ( 15 Feb 2022 21:13 )  PTT:35.5 sec        Radiology:    US Abdomen Upper Quadrant Right:   ACC: [id number] EXAM:  US ABDOMEN RT UPR QUADRANT                          PROCEDURE DATE:  02/16/2022          INTERPRETATION:  CLINICAL INFORMATION: Abdominal pain    COMPARISON: CT abdomen pelvis dated 1/17/2022    TECHNIQUE: Sonography of the right upper quadrant.    FINDINGS:    Liver: Imaged portions appear unremarkable  Bile ducts: Common bile duct measures 4 mm, nondilated.  Gallbladder: Cholecystectomy.  Pancreas: Visualized portions are within normal limits.  Right kidney: 10.1 cm. No hydronephrosis.  Ascites: None.    IMPRESSION:    Status post cholecystectomy. Nondilated CBD.    --- End of Report ---            GUTIERREZ GRIGGS MD; Attending Radiologist  This document has been electronically signed. Feb 16 2022  8:33AM (02-16-22 @ 08:26)

## 2022-02-16 NOTE — ED ADULT NURSE REASSESSMENT NOTE - NS ED NURSE REASSESS COMMENT FT1
Pt alert and oriented MD andrea at bedside assessing pt at this time. pt requesting to eat at this time, pt updated on plan of care and NPO status. pt's vital signs noted to be stable, no acute distress noted. will continue to monitor.

## 2022-02-16 NOTE — H&P ADULT - NSHPREVIEWOFSYSTEMS_GEN_ALL_CORE
CONSTITUTIONAL: No weakness, fevers or chills, no weight loss   EYES/ENT: No visual changes;  No vertigo or throat pain   NECK: No pain or stiffness  RESPIRATORY: No cough, wheezing, hemoptysis; + shortness of breath  CARDIOVASCULAR: + chest pain no palpitations  GASTROINTESTINAL: + abdominal pain. No nausea, vomiting, or hematemesis; + constipation. + melena and hematochezia.  GENITOURINARY: No dysuria, frequency or hematuria  NEUROLOGICAL: No numbness or weakness  All other review of systems is negative unless indicated above.

## 2022-02-16 NOTE — PATIENT PROFILE ADULT - FALL HARM RISK - HARM RISK INTERVENTIONS

## 2022-02-16 NOTE — PHYSICAL THERAPY INITIAL EVALUATION ADULT - GENERAL OBSERVATIONS, REHAB EVAL
Pt woken up by PT, attempted to see pt for b/s PT; however pt declined stating she has had a very poor night of sleep and adamantly requesting to rest. PT educated pt on role of b/s PT, content of PT IE including benefits and importance of mobility training/assessment, and general rehab goals. Pt verbalized understanding but requesting to hold off until tomorrow. PT encouraged pt to notify PT/Staff should she change her mind. PT to f/u as appropriate.

## 2022-02-16 NOTE — H&P ADULT - ATTENDING COMMENTS
76 yr old female with hx of HFpEF, severe AS, HTN, morbidly obese, CKD 3, chronic anemia transfusion-dependent and on ferrous sulfate IV presented to ER for chest pain and SOB while getting a blood transfusion today; also c/o blood in stool, black stools, R leg swelling, abdominal pain.     # HFpEF exacerbation secondary to TACO  # Severe AS  # Chest pain likely 2/2 symptomatic anemia  - volume overloaded  - trop <0.01  - EKG NSR, RBBB, no ST changes  - TTE 9/2021: EF 65%, grade II DD, severe AS, mod MR, mild TR  - start Lasix IV 40 mg with pRBC transfusion  - tele monitoring     # Acute on Chronic blood loss anemia secondary to GIB  - Gastritis vs GERD vs AVM vs Diverticulosis vs hemorrhoids vs malignancy   - hemodynamically stable    - Hb on admission 6.5 (baseline 7-8)  - OLEKSANDR positive in ER  - EGD and [facility] in 2019 and 2020 showing gastritis, diverticulosis, grade 2 hemorrhoids.   - NPO  - start protoxin IV q12  - transfuse 1unit pRBC + lasix   - GI consult    # RLE swelling; r/o DVT  - f[facility]u [facility]    # Periumbilical abdominal pain  - Last BM 4 days ago  - Abdominal US  - Miralax, senna    # Pancytopenia  - etiology unclear; likely secondary to recent COVID infection   - work up showing iron deficiency, elevated kappa and lambda chain with normal ratio  - s/p EPO and iron sulfate IV in Jan 2022  - outpatient f/u with Dr. Gaston    # Hx of COVID  - asymptomatic    # CKD 3  - Cr. 2.1 (at baseline)    # DVT ppx  - SCD     # Full code      *Pt seen on 02/16/22 76 yr old female with hx of HFpEF, severe AS, HTN, morbidly obese, CKD 3, chronic anemia presented to ER for chest pain and SOB while getting a blood transfusion today. Pt went for her weekly blood test at [facility] yesterday and hb was 6.5. She was given venofer, procrit and 1u pRBC. After her transfusion she started to complain of sob associated with diffuse chest discomfort. Patient also having intermittently mixed fresh and dark stools. Last BM was 4 days ago.     # HFpEF exacerbation secondary to TACO  # Severe AS  # Atypical Chest Pain   - volume overloaded  - trop <0.01 --> trend   - EKG NSR, RBBB, no ST changes  - TTE 9/2021: EF 65%, grade II DD, severe AS, mod MR, mild TR  - start Lasix IV 40 mg daily   - tele monitoring     # Acute on Chronic blood loss anemia secondary to GIB  - Gastritis vs GERD vs AVM vs Diverticulosis vs hemorrhoids vs malignancy   - hemodynamically stable    - Hb on admission 6.5 (baseline 7-8) (after getting 1 unit pRBC yesterday)  - OLEKSANDR positive in ER  - EGD and [facility] in 2019 and 2020 showing gastritis, diverticulosis, grade 2 hemorrhoids.   - transfuse 1 unit prbc + lasix   - NPO  - start protoxin IV q12  - GI consult    # RLE swelling  - f/u [facility]    # Pancytopenia  - etiology unclear; likely secondary to recent COVID infection vs MDS  - work up showing iron deficiency, elevated kappa and lambda chain with normal ratio  - s/p EPO and iron sulfate IV yesterday   - outpatient f/u with Dr. Gaston    # Hx of COVID  - asymptomatic    # CKD 3  - Cr. 2.1 (at baseline)    # DVT ppx  - SCD     # Full code      *Pt seen on 02/16/22

## 2022-02-16 NOTE — CONSULT NOTE ADULT - ATTENDING COMMENTS
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 pt had single balloon enteroscopy that did not show any AVM or source of bleeding. extent reached was tattooed. pt's coming in now with worsening anemia and "dark stools." pt is planned for further cardiac workup. will plan for capsule for now to assess for any bleeding AVMs. can consider repeat EGD after cardiac eval is performed.

## 2022-02-16 NOTE — H&P ADULT - NSHPSOCIALHISTORY_GEN_ALL_CORE
Former smoker, no drug use Substance Use (street drugs): ( x ) never used  (  ) other:  Tobacco Usage:  (   ) never smoked   (  x ) former smoker   (   ) current smoker  (     ) pack year  Alcohol Usage: None

## 2022-02-16 NOTE — ASSESSMENT
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 ) , low ferritin . \par peripheral edema . \par Constipation , rectal bleeding ( hemorrhoids ) \par \par \par Plan : Today's Hgb 6.7 \par refusing to go to ED for admission , \par will transfuse 2 units in AM , resume venofer 2X weekly . check ristocetin cofactor\par needs GI work up ASAP  ( as inpatient ? ) , no outpatient follow up to date . ( unable to get apt with [facility] ? )

## 2022-02-16 NOTE — CONSULT NOTE ADULT - ASSESSMENT
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 Nisa Avina:   -No evidence of active GI bleed  -Trend CBC, Keep Hb>7  -pt c/o chest pain and SOB can be from symptomatic anemia/Fluid over load given h/o severe AS (trops negative, repeat echo pending) however r/o any cardiac causes of chest pain) has chronic anemia           #)Acute on chronic iron deficiency anemia  #)On blood transfusions every week/other week with iron transfusions last one on 2/14  -hemodynamically stable  -Baseline Hb 7-8 admitted with Hb of 6.5 improved to 7.4 without any blood transfusion   -OLEKSANDR empty rectal vault   -Had EGD 10/2019: gastritis and colonoscopy 10/2019 good prep, pan colonic diverticulosis, hemorrhoids  -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 Nisa Avina:   -No evidence of active GI bleed  -Trend CBC, Keep Hb>7  -PPI OD   -pt c/o chest pain and SOB can be from symptomatic anemia/Fluid over load given h/o severe AS (trops negative, repeat echo pending) however r/o any cardiac causes of chest pain) has chronic anemia  -based on cardiac risk stratification will plan for EGD with capsule; if higher risk for EGD will plan for only capsule endoscopy          76 y F with PMH of severe aortic stenosis, transfusion-dependent iron deficiency anemia (sees Dr. 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 Paula  -US 4 mm CBD    Recs:   -No evidence of active GI bleed  -Trend CBC, Keep Hb>7  -PPI OD   -pt c/o chest pain and SOB can be from symptomatic anemia/Fluid over load given h/o severe AS (trops negative, repeat echo pending) however r/o any cardiac causes of chest pain) has chronic anemia  -Will do capsule endoscopy tomorrow clear liquid diet, NPO after midnight, 1/2 gallon golytely today evening   -cardiac risk stratification for EGD or enteroscopy based on capsule endoscopy findings.

## 2022-02-16 NOTE — PHYSICAL EXAM
[Ambulatory and capable of all self care but unable to carry out any work activities] : Status 2- Ambulatory and capable of all self care but unable to carry out any work activities. Up and about more than 50% of waking hours [Normal] : clear to auscultation bilaterally, no dullness, no wheezing [de-identified] : pale in no acute distress.  [de-identified] : grade 3/6 systolic murmur  [de-identified] : lower extremity edema ( R > L ) 1 to 2 plus .

## 2022-02-16 NOTE — PATIENT PROFILE ADULT - STATED REASON FOR ADMISSION
went to get a blood transfusion at blood center and started getting pain near heart, pain in stomach, and shortness of breath.

## 2022-02-16 NOTE — H&P ADULT - ASSESSMENT
76 y F with PMH of severe aortic stenosis, transfusion-dependent anemia (sees Dr. Gaston, thought to be 2/2 CKD + obscure GI bleeding), HTN, morbid obesity, CKD presents with chest pain and SOB while getting a blood transfusion today; also c/o blood in stool, black stools, R leg swelling, abdominal pain.     #Chest pain likely 2/2 symptomatic anemia; r/o ACS    #Acute on chronic iron deficiency anemia, transfusion-dependent  #Melena and hematochezia    #RLE swelling; r/o DVT    #Abdominal pain  #Constipation    #CKD    #HTN    #Aortic stenosis    DVT Prophylaxis:  GI Prophylaxis:  Diet:  Activity:   76 y F with PMH of severe aortic stenosis, transfusion-dependent anemia (sees Dr. Gaston, thought to be 2/2 CKD + obscure GI bleeding), HTN, morbid obesity, CKD presents with chest pain and SOB while getting a blood transfusion today; also c/o blood in stool, black stools, R leg swelling, abdominal pain.     #Chronic DARRIN, pancytopenia  #Melena and hematochezia  - Patient receives Retacrit 2000 U weekly and blood transfusions every 1-2 weeks, most recently today  - EGD and colonoscopy in 2020 showed gastritis, diverticulosis, and hemorrhoids   - Hgb 6.5 on admission  - Will give 1 unit pRBCs  - GI consult    #Chest pain likely 2/2 symptomatic anemia  - Troponin negative x1; currently pain-free  - EKG NSR, RBBB, no ST changes  - Trend troponin    #Chronic diastolic heart failure  #Severe AS  #Shortness of breath  - TTE 9/2021: EF 65%, grade II DD, severe AS, mod MR, mild TR  - Volume overloaded on exam  - Give Lasix IV x1 with pRBC transfusion    #RLE swelling; r/o DVT  - f/u [facility] duplex    #Periumbilical abdominal pain  - Last BM 4 days ago  - Abdominal US  - Miralax, senna    #CKD  - At baseline    #HTN  - Resume home metoprolol    DVT Prophylaxis: SCDs  GI Prophylaxis: Protonix IV  Diet: Clear liquids  Activity: AAT   76 y F with PMH of severe aortic stenosis, transfusion-dependent anemia (sees Dr. Gaston, thought to be 2/2 CKD + obscure GI bleeding), HTN, morbid obesity, CKD presents with chest pain and SOB while getting a blood transfusion today; also c/o blood in stool, black stools, R leg swelling, abdominal pain.     #Chronic DARRIN, pancytopenia  #Melena and hematochezia  - Patient receives Retacrit 2000 U weekly and blood transfusions every 1-2 weeks, most recently today  - EGD and colonoscopy in 2020 showed gastritis, diverticulosis, and hemorrhoids   - Hgb 6.5 on admission  - Will give 1 unit pRBCs  - GI consult    #Chest pain likely 2/2 symptomatic anemia  - Troponin negative x1; currently pain-free  - EKG NSR, RBBB, no ST changes  - Trend troponin    #Chronic diastolic heart failure  #Severe AS  #Shortness of breath  - TTE 9/2021: EF 65%, grade II DD, severe AS, mod MR, mild TR  - Volume overloaded on exam  - Lasix IV 40 mg with pRBC transfusion    #RLE swelling; r/o DVT  - f/u [facility] duplex    #Periumbilical abdominal pain  - Last BM 4 days ago  - Abdominal US  - Miralax, senna    #CKD  - At baseline    #HTN  - Resume home metoprolol    DVT Prophylaxis: SCDs  GI Prophylaxis: Protonix IV  Diet: Clear liquids  Activity: AAT

## 2022-02-17 ENCOUNTER — APPOINTMENT (OUTPATIENT)
Dept: INFUSION THERAPY | Facility: CLINIC | Age: 77
End: 2022-02-17

## 2022-02-17 LAB
ALBUMIN SERPL ELPH-MCNC: 3.4 G/DL — LOW (ref 3.5–5.2)
ALBUMIN SERPL ELPH-MCNC: 3.6 G/DL — SIGNIFICANT CHANGE UP (ref 3.5–5.2)
ALP SERPL-CCNC: 75 U/L — SIGNIFICANT CHANGE UP (ref 30–115)
ALP SERPL-CCNC: 82 U/L — SIGNIFICANT CHANGE UP (ref 30–115)
ALT FLD-CCNC: 6 U/L — SIGNIFICANT CHANGE UP (ref 0–41)
ALT FLD-CCNC: 6 U/L — SIGNIFICANT CHANGE UP (ref 0–41)
ANION GAP SERPL CALC-SCNC: 14 MMOL/L — SIGNIFICANT CHANGE UP (ref 7–14)
ANION GAP SERPL CALC-SCNC: 14 MMOL/L — SIGNIFICANT CHANGE UP (ref 7–14)
AST SERPL-CCNC: 14 U/L — SIGNIFICANT CHANGE UP (ref 0–41)
AST SERPL-CCNC: 22 U/L — SIGNIFICANT CHANGE UP (ref 0–41)
BASOPHILS # BLD AUTO: 0.01 K/UL — SIGNIFICANT CHANGE UP (ref 0–0.2)
BASOPHILS NFR BLD AUTO: 0.2 % — SIGNIFICANT CHANGE UP (ref 0–1)
BILIRUB SERPL-MCNC: 1.1 MG/DL — SIGNIFICANT CHANGE UP (ref 0.2–1.2)
BILIRUB SERPL-MCNC: 1.3 MG/DL — HIGH (ref 0.2–1.2)
BUN SERPL-MCNC: 55 MG/DL — HIGH (ref 10–20)
BUN SERPL-MCNC: 58 MG/DL — HIGH (ref 10–20)
CALCIUM SERPL-MCNC: 8.8 MG/DL — SIGNIFICANT CHANGE UP (ref 8.5–10.1)
CALCIUM SERPL-MCNC: 8.9 MG/DL — SIGNIFICANT CHANGE UP (ref 8.5–10.1)
CHLORIDE SERPL-SCNC: 106 MMOL/L — SIGNIFICANT CHANGE UP (ref 98–110)
CHLORIDE SERPL-SCNC: 108 MMOL/L — SIGNIFICANT CHANGE UP (ref 98–110)
CO2 SERPL-SCNC: 17 MMOL/L — SIGNIFICANT CHANGE UP (ref 17–32)
CO2 SERPL-SCNC: 19 MMOL/L — SIGNIFICANT CHANGE UP (ref 17–32)
CREAT SERPL-MCNC: 2.6 MG/DL — HIGH (ref 0.7–1.5)
CREAT SERPL-MCNC: 2.9 MG/DL — HIGH (ref 0.7–1.5)
EOSINOPHIL # BLD AUTO: 0.01 K/UL — SIGNIFICANT CHANGE UP (ref 0–0.7)
EOSINOPHIL NFR BLD AUTO: 0.2 % — SIGNIFICANT CHANGE UP (ref 0–8)
GLUCOSE BLDC GLUCOMTR-MCNC: 147 MG/DL — HIGH (ref 70–99)
GLUCOSE SERPL-MCNC: 75 MG/DL — SIGNIFICANT CHANGE UP (ref 70–99)
GLUCOSE SERPL-MCNC: 88 MG/DL — SIGNIFICANT CHANGE UP (ref 70–99)
HCT VFR BLD CALC: 28.7 % — LOW (ref 37–47)
HGB BLD-MCNC: 8.9 G/DL — LOW (ref 12–16)
IMM GRANULOCYTES NFR BLD AUTO: 0.3 % — SIGNIFICANT CHANGE UP (ref 0.1–0.3)
LYMPHOCYTES # BLD AUTO: 0.54 K/UL — LOW (ref 1.2–3.4)
LYMPHOCYTES # BLD AUTO: 8.7 % — LOW (ref 20.5–51.1)
MAGNESIUM SERPL-MCNC: 2.3 MG/DL — SIGNIFICANT CHANGE UP (ref 1.8–2.4)
MAGNESIUM SERPL-MCNC: 2.4 MG/DL — SIGNIFICANT CHANGE UP (ref 1.8–2.4)
MCHC RBC-ENTMCNC: 29.4 PG — SIGNIFICANT CHANGE UP (ref 27–31)
MCHC RBC-ENTMCNC: 31 G/DL — LOW (ref 32–37)
MCV RBC AUTO: 94.7 FL — SIGNIFICANT CHANGE UP (ref 81–99)
MONOCYTES # BLD AUTO: 0.17 K/UL — SIGNIFICANT CHANGE UP (ref 0.1–0.6)
MONOCYTES NFR BLD AUTO: 2.7 % — SIGNIFICANT CHANGE UP (ref 1.7–9.3)
NEUTROPHILS # BLD AUTO: 5.47 K/UL — SIGNIFICANT CHANGE UP (ref 1.4–6.5)
NEUTROPHILS NFR BLD AUTO: 87.9 % — HIGH (ref 42.2–75.2)
NRBC # BLD: 0 /100 WBCS — SIGNIFICANT CHANGE UP (ref 0–0)
PLATELET # BLD AUTO: 119 K/UL — LOW (ref 130–400)
POTASSIUM SERPL-MCNC: 5.1 MMOL/L — HIGH (ref 3.5–5)
POTASSIUM SERPL-MCNC: 6.2 MMOL/L — CRITICAL HIGH (ref 3.5–5)
POTASSIUM SERPL-SCNC: 5.1 MMOL/L — HIGH (ref 3.5–5)
POTASSIUM SERPL-SCNC: 6.2 MMOL/L — CRITICAL HIGH (ref 3.5–5)
PROT SERPL-MCNC: 5.9 G/DL — LOW (ref 6–8)
PROT SERPL-MCNC: 6.2 G/DL — SIGNIFICANT CHANGE UP (ref 6–8)
RBC # BLD: 3.03 M/UL — LOW (ref 4.2–5.4)
RBC # FLD: 16.9 % — HIGH (ref 11.5–14.5)
SODIUM SERPL-SCNC: 139 MMOL/L — SIGNIFICANT CHANGE UP (ref 135–146)
SODIUM SERPL-SCNC: 139 MMOL/L — SIGNIFICANT CHANGE UP (ref 135–146)
WBC # BLD: 6.22 K/UL — SIGNIFICANT CHANGE UP (ref 4.8–10.8)
WBC # FLD AUTO: 6.22 K/UL — SIGNIFICANT CHANGE UP (ref 4.8–10.8)

## 2022-02-17 PROCEDURE — 99223 1ST HOSP IP/OBS HIGH 75: CPT

## 2022-02-17 PROCEDURE — 99233 SBSQ HOSP IP/OBS HIGH 50: CPT

## 2022-02-17 PROCEDURE — 99232 SBSQ HOSP IP/OBS MODERATE 35: CPT

## 2022-02-17 PROCEDURE — 93306 TTE W/DOPPLER COMPLETE: CPT | Mod: 26

## 2022-02-17 RX ORDER — ACETAMINOPHEN 500 MG
650 TABLET ORAL EVERY 6 HOURS
Refills: 0 | Status: DISCONTINUED | OUTPATIENT
Start: 2022-02-17 | End: 2022-02-23

## 2022-02-17 RX ORDER — INSULIN HUMAN 100 [IU]/ML
10 INJECTION, SOLUTION SUBCUTANEOUS ONCE
Refills: 0 | Status: COMPLETED | OUTPATIENT
Start: 2022-02-17 | End: 2022-02-17

## 2022-02-17 RX ORDER — CALCIUM GLUCONATE 100 MG/ML
2 VIAL (ML) INTRAVENOUS ONCE
Refills: 0 | Status: COMPLETED | OUTPATIENT
Start: 2022-02-17 | End: 2022-02-17

## 2022-02-17 RX ORDER — DEXTROSE 10 % IN WATER 10 %
250 INTRAVENOUS SOLUTION INTRAVENOUS ONCE
Refills: 0 | Status: COMPLETED | OUTPATIENT
Start: 2022-02-17 | End: 2022-02-17

## 2022-02-17 RX ORDER — DEXTROSE 50 % IN WATER 50 %
50 SYRINGE (ML) INTRAVENOUS ONCE
Refills: 0 | Status: DISCONTINUED | OUTPATIENT
Start: 2022-02-17 | End: 2022-02-17

## 2022-02-17 RX ADMIN — SODIUM ZIRCONIUM CYCLOSILICATE 10 GRAM(S): 10 POWDER, FOR SUSPENSION ORAL at 00:31

## 2022-02-17 RX ADMIN — INSULIN HUMAN 10 UNIT(S): 100 INJECTION, SOLUTION SUBCUTANEOUS at 21:31

## 2022-02-17 RX ADMIN — PANTOPRAZOLE SODIUM 40 MILLIGRAM(S): 20 TABLET, DELAYED RELEASE ORAL at 05:24

## 2022-02-17 RX ADMIN — PANTOPRAZOLE SODIUM 40 MILLIGRAM(S): 20 TABLET, DELAYED RELEASE ORAL at 17:27

## 2022-02-17 RX ADMIN — Medication 200 GRAM(S): at 15:08

## 2022-02-17 RX ADMIN — Medication 650 MILLIGRAM(S): at 17:27

## 2022-02-17 RX ADMIN — Medication 25 MILLIGRAM(S): at 17:27

## 2022-02-17 RX ADMIN — Medication 500 MILLILITER(S): at 21:49

## 2022-02-17 NOTE — PHYSICAL THERAPY INITIAL EVALUATION ADULT - GENERAL OBSERVATIONS, REHAB EVAL
Chart reviewed, Chart reviewed, pt encountered supine, sleeping. Current (9:39 am 2/17/22) HgB levels 8.9. PT eval attempted however pt refused 2* fatigue. Pt states she rather have PT after capsule endoscopy. Pt s/p transfusion. Pt educated on importance of PT and benefits of mobility. PT will f/u as scar.

## 2022-02-17 NOTE — PROGRESS NOTE ADULT - ASSESSMENT
ASSESSMENT & PLAN    76 y F with PMH of severe aortic stenosis, transfusion-dependent anemia (sees Dr. Gaston, thought to be 2/2 CKD + obscure GI bleeding), HTN, morbid obesity, CKD presents with chest pain and SOB while getting a blood transfusion.    #Chronic DARRIN, pancytopenia  #Melena and hematochezia  - Patient receives Retacrit 2000 U weekly and blood transfusions every 1-2 weeks, most recently today  - EGD and colonoscopy in 2020 showed gastritis, diverticulosis, and hemorrhoids   - Hgb 6.5 on admission, s/p 1u pRBC-->7.4  - Repeat Hb 6.9, received 1U PRBC 2/16  - GI consult--->no indication for endoscopy evaluation at this time  - Pt will undergo pill endoscopy 2/17 for eval of small bowel AVMs  - Possible EGD afterwards pending cardiac risk stratification    #Chest pain likely 2/2 symptomatic anemia  - Troponin negative x1; currently pain-free  - EKG NSR, RBBB, no ST changes  - Trend troponin, -ve x2    #Chronic diastolic heart failure  #Severe AS  #Shortness of breath  - TTE 9/2021: EF 65%, grade II DD, severe AS, mod MR, mild TR  - Volume overloaded on exam  - bibasilar opacities on CXR- improved on serial imaging  - s/p Lasix IV 40 mg with pRBC transfusion  - repeat lasix IV 20mg x1 2/16, will give lasix PRN  - Received total of 2U PRBCs so far    #RLE swelling; r/o DVT  - Duplex -ve for DVT    #Periumbilical abdominal pain  - Initially last BM 4 days prior to admission  - Abdominal US normal, no CBD dilation or liver pathology  - Miralax, senna  - s/p Golytely bowel prep for pill endoscopy 2/17    #CKD  - Baseline Cr 2.1  - Cr 2.1 -> 2.3 -> 2.6  - Will monitor for now, if worsening send urine lytes    #HTN  - c/w home metoprolol 25 bid    #Handoff: Pt here with chronic DARRIN undergoing capsule endoscopy for eval of small bowel AVMs. Possible EGD afterwards pending cardiac risk stratification.                                                                               ----------------------------------------------------  # DVT prophylaxis SCDs    # GI prophylaxis Protonix bid    # Diet Currently NPO, Clear liquid     # Activity Score (AM-PAC)    # Code status Full    # Disposition Acute

## 2022-02-17 NOTE — PROGRESS NOTE ADULT - SUBJECTIVE AND OBJECTIVE BOX
LATRICIA ARREAGA  76y  Female      Patient is a 76y old  Female who presents with a chief complaint of Chest pain, anemia, melena (17 Feb 2022 10:27)      INTERVAL HPI/OVERNIGHT EVENTS: no acute events overnight. did not complete GoLytely prep. having brown BMs this AM.       REVIEW OF SYSTEMS:  CONSTITUTIONAL: No fever, weight loss, or fatigue  RESPIRATORY: No cough, wheezing, chills or hemoptysis; No shortness of breath  CARDIOVASCULAR: No chest pain, palpitations, dizziness, or leg swelling  GASTROINTESTINAL: No abdominal or epigastric pain. No nausea, vomiting, or hematemesis; No diarrhea or constipation. No melena or hematochezia.  GENITOURINARY: No dysuria, frequency, hematuria, or incontinence  NEUROLOGICAL: No headaches, memory loss, loss of strength, numbness, or tremors  SKIN: No itching, burning, rashes, or lesions   MUSCULOSKELETAL: No joint pain or swelling; No muscle, back, or extremity pain  PSYCHIATRIC: No depression, anxiety, mood swings, or difficulty sleeping  All other review of systems negative    VITALS  T(C): 36.1 (02-17-22 @ 05:00), Max: 36.1 (02-17-22 @ 05:00)  HR: 62 (02-17-22 @ 05:00) (61 - 73)  BP: 99/45 (02-17-22 @ 05:00) (99/45 - 138/63)  RR: 18 (02-17-22 @ 05:00) (17 - 18)  SpO2: 100% (02-17-22 @ 02:35) (96% - 100%)  Wt(kg): --Vital Signs Last 24 Hrs  T(C): 36.1 (17 Feb 2022 05:00), Max: 36.1 (17 Feb 2022 05:00)  T(F): 96.9 (17 Feb 2022 05:00), Max: 96.9 (17 Feb 2022 05:00)  HR: 62 (17 Feb 2022 05:00) (61 - 73)  BP: 99/45 (17 Feb 2022 05:00) (99/45 - 138/63)  BP(mean): --  RR: 18 (17 Feb 2022 05:00) (17 - 18)  SpO2: 100% (17 Feb 2022 02:35) (96% - 100%)      02-16-22 @ 07:01  -  02-17-22 @ 07:00  --------------------------------------------------------  IN: 268 mL / OUT: 601 mL / NET: -333 mL      PHYSICAL EXAM:  GENERAL: elderly F, NAD, non toxic appearing  EYES: anicteric sclera, non injected conjunctiva, EOMI  ENT: poor dentition, dry MM  PSYCH: no agitation, baseline mentation  NERVOUS SYSTEM:  Alert & Oriented X3, CN 2-12 grossly intact  PULMONARY: Clear to percussion bilaterally; No rales, rhonchi, wheezing, or rubs  CARDIOVASCULAR: Regular rate and rhythm; No murmurs, rubs, or gallops  GI: Soft, Nontender, Nondistended; Bowel sounds present  EXTREMITIES:  2+ Peripheral Pulses, No clubbing, cyanosis, or edema  SKIN: No rashes or lesions    Consultant(s) Notes Reviewed:  [x ] YES  [ ] NO    Discussed with Consultants/Other Providers [ x] YES     LABS                          8.9    6.22  )-----------( 119      ( 17 Feb 2022 04:30 )             28.7     02-17    139  |  108  |  55<H>  ----------------------------<  88  6.2<HH>   |  17  |  2.6<H>    Ca    8.8      17 Feb 2022 04:30  Mg     2.4     02-17    TPro  6.2  /  Alb  3.6  /  TBili  1.3<H>  /  DBili  x   /  AST  22  /  ALT  6   /  AlkPhos  82  02-17  PT/INR - ( 15 Feb 2022 21:13 )   PT: 12.30 sec;   INR: 1.07 ratio    PTT - ( 15 Feb 2022 21:13 )  PTT:35.5 sec    CARDIAC MARKERS ( 16 Feb 2022 05:43 )  x     / <0.01 ng/mL / x     / x     / x      CARDIAC MARKERS ( 15 Feb 2022 19:09 )  x     / <0.01 ng/mL / x     / x     / x        RADIOLOGY & ADDITIONAL TESTS:  - no images 2/17  Imaging Personally Reviewed:  [ ] YES  [ ] NO    HEALTH ISSUES - PROBLEM Dx:  Suspected deep vein thrombosis (DVT)      MEDICATIONS  (STANDING):  metoprolol tartrate 25 milliGRAM(s) Oral two times a day  pantoprazole  Injectable 40 milliGRAM(s) IV Push two times a day  polyethylene glycol 3350 17 Gram(s) Oral daily  senna 2 Tablet(s) Oral at bedtime  sodium zirconium cyclosilicate 10 Gram(s) Oral daily    MEDICATIONS  (PRN):  aluminum hydroxide/magnesium hydroxide/simethicone Suspension 30 milliLiter(s) Oral every 4 hours PRN Dyspepsia  melatonin 3 milliGRAM(s) Oral at bedtime PRN Insomnia

## 2022-02-17 NOTE — CONSULT NOTE ADULT - ASSESSMENT
76 year old female has medical history of severe aortic stenosis, transfusion-dependent iron deficiency anemia (sees Dr. Gaston, thought to be 2/2 CKD + obscure GI bleeding), HTN, morbid obesity, CKD presented with abdominal pain, and black stool, received one unit of PRBC on 2/16, complicated by chest pain. Trop 0.01. Cardiology team consulted for Risk stratification for EGD or enteroscopy, depending on capsule endoscopy    Risk stratification  - probable CAD  - Echo from 2019, EF 68%, G1DD, AS  - EKG: Sinus   - RCRI: Class II risk, moderate risk     This note is incomplete   76 year old female has medical history of severe aortic stenosis, transfusion-dependent iron deficiency anemia (sees Dr. Gaston, thought to be 2/2 CKD + obscure GI bleeding), HTN, morbid obesity, CKD presented with abdominal pain, and black stool, received one unit of PRBC on 2/16, complicated by mild chest pain. Trop 0.01. Cardiology team consulted for Risk stratification for EGD or enteroscopy, depending on capsule endoscopy    Risk stratification  - probable CAD, Aortic stenosis, follows Dr. Dawn   - Echo from 9/25/21, EF 68%, GIIDD, moderate AS with mild concentric LVH  - EKG: Sinus   - RCRI: Class II risk, moderate to high risk    This note is incomplete   76 year old female has medical history of severe aortic stenosis, transfusion-dependent iron deficiency anemia (sees Dr. Gaston, thought to be 2/2 CKD + obscure GI bleeding), HTN, morbid obesity, CKD presented with abdominal pain, and black stool, received one unit of PRBC on 2/16, complicated by mild chest pain. Trop 0.01. Cardiology team consulted for Risk stratification for EGD or enteroscopy, depending on capsule endoscopy    Risk stratification  - probable CAD, Aortic stenosis, follows Dr. Dawn   - Echo from 9/25/21, EF 68%, GIIDD, moderate AS (grade III) with mild concentric LVH  - EKG: Sinus   - RCRI: Class III/IV risk, METs< 4  - FU repeat Echo, given history of mod to severe, most likely will require cardiac anesthesia

## 2022-02-17 NOTE — CONSULT NOTE ADULT - SUBJECTIVE AND OBJECTIVE BOX
Date of Admission:    CHIEF COMPLAINT:    HISTORY OF PRESENT ILLNESS: 76 y F with PMH of severe aortic stenosis, transfusion-dependent anemia (sees Dr. Gaston, thought to be 2/2 CKD + obscure GI bleeding), HTN, morbid obesity, CKD presents with chest pain and SOB while getting a blood transfusion today; also c/o blood in stool, black stools, R leg swelling, abdominal pain. Chest pain is substernal and left sided, tightness, not related to exertion (but she has not exerted herself), started after receiving 1 unit pRBCs today, associated with some mild SOB. She reports Right leg pain and swelling but unsure for how long. Also c/o periumbilical pain, last BM 4 days ago. She denies palpitations, fever, cough, n/v, hematemesis, dysuria.    ED VS: T(F): , Max: 97.6  HR:  (71 - 72)  BP:  (129/63 - 146/66)  RR: 18  SpO2:  (97% - 99%)    Labs: Hgb 6.5, trop neg x1, proBNP 1191  Imaging: RLE duplex pending  OLEKSANDR positive in ED. 1 unit pRBC ordered    Pt is admitted for chest pain, symptomatic anemia   (16 Feb 2022 00:47)      PAST MEDICAL & SURGICAL HISTORY:  HTN (hypertension)    Heart murmur    Eczema    Anemia    Aortic stenosis    H/O appendicitis    H/O cholecystitis        FAMILY HISTORY:  [ ] no pertinent family history of premature cardiovascular disease in first degree relatives.  Mother:   Father:   Siblings:     SOCIAL HISTORY:    [ ] Non-smoker  [ ] Smoker  [ ] Alcohol    Allergies    aspirin (Rash)  latex (Unknown)  penicillins (Unknown)    Intolerances    	    REVIEW OF SYSTEMS:  CONSTITUTIONAL: denies fever, weight loss, or fatigue  CARDIOLOGY: denies chest pain, shortness of breath or syncopal episodes.   RESPIRATORY: denies shortness of breath, wheezing.   NEUROLOGICAL: denies weakness, no focal deficits to report.  ENDOCRINOLOGICAL: no recent change in diabetic medications.   GI: no BRBPR, no N,V, diarrhea.    PSYCHIATRY: normal mood and affect  HEENT: no nasal discharge, no ecchymosis  SKIN: no ecchymosis, no breakdown  MUSCULOSKELETAL: Full range of motion x4.     PHYSICAL EXAM:  T(C): 36.1 (02-17-22 @ 05:00), Max: 36.1 (02-17-22 @ 05:00)  HR: 62 (02-17-22 @ 05:00) (61 - 73)  BP: 99/45 (02-17-22 @ 05:00) (99/45 - 138/63)  RR: 18 (02-17-22 @ 05:00) (17 - 18)  SpO2: 100% (02-17-22 @ 02:35) (96% - 100%)  Wt(kg): --  I&O's Summary    16 Feb 2022 07:01  -  17 Feb 2022 07:00  --------------------------------------------------------  IN: 268 mL / OUT: 601 mL / NET: -333 mL        General Appearance: well appearing, normal for age and gender. 	  Neck: normal JVP, no bruit.   Eyes: No xanthomalasia, Extra Ocular muscles intact.   Cardiovascular: regular rate and rhythm S1 S2, No JVD, No murmurs, No edema  Respiratory: Lungs clear to auscultation	  Psychiatry: Alert and oriented x 3, Mood & affect appropriate  Gastrointestinal:  Soft, Non-tender  Skin/Integumen: No rashes, No ecchymoses, No cyanosis	  Neurologic: Non-focal  Musculoskeletal/extremities: Normal range of motion, No clubbing, cyanosis or edema  Vascular: Peripheral pulses palpable 2+ bilaterally    LABS:	 	                          8.9    6.22  )-----------( 119      ( 17 Feb 2022 04:30 )             28.7     02-17    139  |  108  |  55<H>  ----------------------------<  88  6.2<HH>   |  17  |  2.6<H>    Ca    8.8      17 Feb 2022 04:30  Mg     2.4     02-17    TPro  6.2  /  Alb  3.6  /  TBili  1.3<H>  /  DBili  x   /  AST  22  /  ALT  6   /  AlkPhos  82  02-17    CARDIAC MARKERS ( 16 Feb 2022 05:43 )  x     / <0.01 ng/mL / x     / x     / x      CARDIAC MARKERS ( 15 Feb 2022 19:09 )  x     / <0.01 ng/mL / x     / x     / x          PT/INR - ( 15 Feb 2022 21:13 )   PT: 12.30 sec;   INR: 1.07 ratio         PTT - ( 15 Feb 2022 21:13 )  PTT:35.5 sec    CARDIAC MARKERS:            TELEMETRY EVENTS: 	    ECG:  	  RADIOLOGY:  OTHER: 	    PREVIOUS DIAGNOSTIC TESTING:    [ ] Echocardiogram:  [ ] Catheterization:  [ ] Stress Test:  	  	    Home Medications:  aluminum hydroxide-magnesium hydroxide 200 mg-200 mg/5 mL oral suspension: 30 milliliter(s) orally every 4 hours, As needed, Dyspepsia (24 Jan 2022 12:15)  heparin: 5000 unit(s) subcutaneous 3 times a day (24 Jan 2022 12:15)  melatonin 3 mg oral tablet: 1 tab(s) orally once a day (at bedtime), As needed, Insomnia (24 Jan 2022 12:15)  Metoprolol Tartrate 25 mg oral tablet: 1 tab(s) orally 2 times a day (18 Jan 2022 00:11)  pantoprazole 40 mg oral delayed release tablet: 1 tab(s) orally once a day (before a meal) (24 Jan 2022 12:15)    MEDICATIONS  (STANDING):  metoprolol tartrate 25 milliGRAM(s) Oral two times a day  pantoprazole  Injectable 40 milliGRAM(s) IV Push two times a day  polyethylene glycol 3350 17 Gram(s) Oral daily  senna 2 Tablet(s) Oral at bedtime  sodium zirconium cyclosilicate 10 Gram(s) Oral daily    MEDICATIONS  (PRN):  aluminum hydroxide/magnesium hydroxide/simethicone Suspension 30 milliLiter(s) Oral every 4 hours PRN Dyspepsia  melatonin 3 milliGRAM(s) Oral at bedtime PRN Insomnia         Date of Admission:    CHIEF COMPLAINT:    HISTORY OF PRESENT ILLNESS: 76 y F with PMH of severe aortic stenosis, transfusion-dependent anemia (sees Dr. Gaston, thought to be 2/2 CKD + obscure GI bleeding), HTN, morbid obesity, CKD presents with chest pain and SOB while getting a blood transfusion today; also c/o blood in stool, black stools, R leg swelling, abdominal pain. Chest pain is substernal and left sided, tightness, not related to exertion (but she has not exerted herself), started after receiving 1 unit pRBCs today, associated with some mild SOB. She reports Right leg pain and swelling but unsure for how long. Also c/o periumbilical pain, last BM 4 days ago. She denies palpitations, fever, cough, n/v, hematemesis, dysuria.    ED VS: T(F): , Max: 97.6  HR:  (71 - 72)  BP:  (129/63 - 146/66)  RR: 18  SpO2:  (97% - 99%)    Labs: Hgb 6.5, trop neg x1, proBNP 1191  Imaging: RLE duplex pending  OLEKSANDR positive in ED. 1 unit pRBC ordered    Pt is admitted for chest pain, symptomatic anemia   (16 Feb 2022 00:47)      PAST MEDICAL & SURGICAL HISTORY:  HTN (hypertension)    Heart murmur    Eczema    Anemia    Aortic stenosis    H/O appendicitis    H/O cholecystitis        FAMILY HISTORY:  [ ] no pertinent family history of premature cardiovascular disease in first degree relatives.  Mother:   Father:   Siblings:     SOCIAL HISTORY:    [ ] Non-smoker  [ ] Smoker  [ ] Alcohol    Allergies    aspirin (Rash)  latex (Unknown)  penicillins (Unknown)    Intolerances    	    REVIEW OF SYSTEMS:  CONSTITUTIONAL: denies fever, weight loss, or fatigue  CARDIOLOGY: denies chest pain, shortness of breath or syncopal episodes.   RESPIRATORY: denies shortness of breath, wheezing.   NEUROLOGICAL: denies weakness, no focal deficits to report.  ENDOCRINOLOGICAL: no recent change in diabetic medications.   GI: no BRBPR, no N,V, diarrhea.    PSYCHIATRY: normal mood and affect  HEENT: no nasal discharge, no ecchymosis  SKIN: no ecchymosis, no breakdown  MUSCULOSKELETAL: Full range of motion x4.     PHYSICAL EXAM:  T(C): 36.1 (02-17-22 @ 05:00), Max: 36.1 (02-17-22 @ 05:00)  HR: 62 (02-17-22 @ 05:00) (61 - 73)  BP: 99/45 (02-17-22 @ 05:00) (99/45 - 138/63)  RR: 18 (02-17-22 @ 05:00) (17 - 18)  SpO2: 100% (02-17-22 @ 02:35) (96% - 100%)  Wt(kg): --  I&O's Summary    16 Feb 2022 07:01  -  17 Feb 2022 07:00  --------------------------------------------------------  IN: 268 mL / OUT: 601 mL / NET: -333 mL        General Appearance: well appearing, normal for age and gender. 	  Neck: normal JVP, no bruit.   Eyes: No xanthomalasia, Extra Ocular muscles intact.   Cardiovascular: regular rate and rhythm S1 S2, Systolic murmur  Respiratory: Lungs clear to auscultation	  Psychiatry: Alert and oriented x 3, Mood & affect appropriate  Gastrointestinal:  Soft, Non-tender  Skin/Integumen: No rashes, No ecchymoses, No cyanosis	  Neurologic: Non-focal  Musculoskeletal/extremities: Normal range of motion, No clubbing, cyanosis, 1+ Edema on the right lower leg  Vascular: Peripheral pulses palpable 2+ bilaterally    LABS:	 	                          8.9    6.22  )-----------( 119      ( 17 Feb 2022 04:30 )             28.7     02-17    139  |  108  |  55<H>  ----------------------------<  88  6.2<HH>   |  17  |  2.6<H>    Ca    8.8      17 Feb 2022 04:30  Mg     2.4     02-17    TPro  6.2  /  Alb  3.6  /  TBili  1.3<H>  /  DBili  x   /  AST  22  /  ALT  6   /  AlkPhos  82  02-17    CARDIAC MARKERS ( 16 Feb 2022 05:43 )  x     / <0.01 ng/mL / x     / x     / x      CARDIAC MARKERS ( 15 Feb 2022 19:09 )  x     / <0.01 ng/mL / x     / x     / x          PT/INR - ( 15 Feb 2022 21:13 )   PT: 12.30 sec;   INR: 1.07 ratio         PTT - ( 15 Feb 2022 21:13 )  PTT:35.5 sec    CARDIAC MARKERS:            TELEMETRY EVENTS: 	    ECG:  	  RADIOLOGY:  OTHER: 	    PREVIOUS DIAGNOSTIC TESTING:    [ ] Echocardiogram:  [ ] Catheterization:  [ ] Stress Test:  	  	    Home Medications:  aluminum hydroxide-magnesium hydroxide 200 mg-200 mg/5 mL oral suspension: 30 milliliter(s) orally every 4 hours, As needed, Dyspepsia (24 Jan 2022 12:15)  heparin: 5000 unit(s) subcutaneous 3 times a day (24 Jan 2022 12:15)  melatonin 3 mg oral tablet: 1 tab(s) orally once a day (at bedtime), As needed, Insomnia (24 Jan 2022 12:15)  Metoprolol Tartrate 25 mg oral tablet: 1 tab(s) orally 2 times a day (18 Jan 2022 00:11)  pantoprazole 40 mg oral delayed release tablet: 1 tab(s) orally once a day (before a meal) (24 Jan 2022 12:15)    MEDICATIONS  (STANDING):  metoprolol tartrate 25 milliGRAM(s) Oral two times a day  pantoprazole  Injectable 40 milliGRAM(s) IV Push two times a day  polyethylene glycol 3350 17 Gram(s) Oral daily  senna 2 Tablet(s) Oral at bedtime  sodium zirconium cyclosilicate 10 Gram(s) Oral daily    MEDICATIONS  (PRN):  aluminum hydroxide/magnesium hydroxide/simethicone Suspension 30 milliLiter(s) Oral every 4 hours PRN Dyspepsia  melatonin 3 milliGRAM(s) Oral at bedtime PRN Insomnia

## 2022-02-17 NOTE — PROGRESS NOTE ADULT - ASSESSMENT
76 y F with PMH of severe aortic stenosis, transfusion-dependent iron deficiency anemia (sees Dr. 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 Paula  -US 4 mm CBD    Recs:   -No evidence of active GI bleed (drop in hb to 6.9 might be wrong value after one unit inc to 8.9)  -Trend CBC, Keep Hb>7  -PPI OD   -cardiology consult pending   -capsule endoscopy today   -cardiac risk stratification for EGD or enteroscopy based on capsule endoscopy findings.

## 2022-02-17 NOTE — PROGRESS NOTE ADULT - SUBJECTIVE AND OBJECTIVE BOX
LATRICIA ARREAGA 76y Female  MRN#: [id number]   CODE STATUS: Full    Hospital Day: 1d    Pt is currently admitted with the primary diagnosis of Chronic DARRIN    SUBJECTIVE  Hospital Course  Pt seen and examined at bedside. Pt going for pill endoscopy today for eval of small bowel AVMs. Cardiac risk stratification pending for EGD for further eval of GIB.     Overnight events   None    Subjective complaints   None                                            ----------------------------------------------------------  OBJECTIVE  PAST MEDICAL & SURGICAL HISTORY  HTN (hypertension)    Heart murmur    Eczema    Anemia    Aortic stenosis    H/O appendicitis    H/O cholecystitis                                              -----------------------------------------------------------  ALLERGIES:  aspirin (Rash)  latex (Unknown)  penicillins (Unknown)                                            ------------------------------------------------------------    HOME MEDICATIONS  Home Medications:  aluminum hydroxide-magnesium hydroxide 200 mg-200 mg/5 mL oral suspension: 30 milliliter(s) orally every 4 hours, As needed, Dyspepsia (24 Jan 2022 12:15)  heparin: 5000 unit(s) subcutaneous 3 times a day (24 Jan 2022 12:15)  melatonin 3 mg oral tablet: 1 tab(s) orally once a day (at bedtime), As needed, Insomnia (24 Jan 2022 12:15)  Metoprolol Tartrate 25 mg oral tablet: 1 tab(s) orally 2 times a day (18 Jan 2022 00:11)  pantoprazole 40 mg oral delayed release tablet: 1 tab(s) orally once a day (before a meal) (24 Jan 2022 12:15)                           MEDICATIONS:  STANDING MEDICATIONS  metoprolol tartrate 25 milliGRAM(s) Oral two times a day  pantoprazole  Injectable 40 milliGRAM(s) IV Push two times a day  polyethylene glycol 3350 17 Gram(s) Oral daily  senna 2 Tablet(s) Oral at bedtime  sodium zirconium cyclosilicate 10 Gram(s) Oral daily    PRN MEDICATIONS  aluminum hydroxide/magnesium hydroxide/simethicone Suspension 30 milliLiter(s) Oral every 4 hours PRN  melatonin 3 milliGRAM(s) Oral at bedtime PRN                                            ------------------------------------------------------------  VITAL SIGNS: Last 24 Hours  T(C): 36.1 (17 Feb 2022 05:00), Max: 36.1 (17 Feb 2022 05:00)  T(F): 96.9 (17 Feb 2022 05:00), Max: 96.9 (17 Feb 2022 05:00)  HR: 62 (17 Feb 2022 05:00) (61 - 73)  BP: 99/45 (17 Feb 2022 05:00) (99/45 - 138/63)  BP(mean): --  RR: 18 (17 Feb 2022 05:00) (17 - 18)  SpO2: 100% (17 Feb 2022 02:35) (96% - 100%)      02-16-22 @ 07:01  -  02-17-22 @ 07:00  --------------------------------------------------------  IN: 268 mL / OUT: 601 mL / NET: -333 mL                                             --------------------------------------------------------------  LABS:                        8.9    6.22  )-----------( 119      ( 17 Feb 2022 04:30 )             28.7     02-17    139  |  108  |  55<H>  ----------------------------<  88  6.2<HH>   |  17  |  2.6<H>    Ca    8.8      17 Feb 2022 04:30  Mg     2.4     02-17    TPro  6.2  /  Alb  3.6  /  TBili  1.3<H>  /  DBili  x   /  AST  22  /  ALT  6   /  AlkPhos  82  02-17    PT/INR - ( 15 Feb 2022 21:13 )   PT: 12.30 sec;   INR: 1.07 ratio         PTT - ( 15 Feb 2022 21:13 )  PTT:35.5 sec          CARDIAC MARKERS ( 16 Feb 2022 05:43 )  x     / <0.01 ng/mL / x     / x     / x      CARDIAC MARKERS ( 15 Feb 2022 19:09 )  x     / <0.01 ng/mL / x     / x     / x                                                  -------------------------------------------------------------  RADIOLOGY:    < from: US Abdomen Upper Quadrant Right (02.16.22 @ 08:26) >    IMPRESSION:    Status post cholecystectomy. Nondilated CBD.    --- End of Report ---    < end of copied text >  < from: Xray Chest 1 View- PORTABLE-Urgent (02.15.22 @ 22:55) >  Impression:    Stable to decreased bibasilar opacities.    --- End of Report ---    < end of copied text >  < from: [facility] Duplex Lower Ext Vein Scan, Bilat (02.15.22 @ 21:40) >  Impression:    No evidence of deep venous thrombosis or superficial thrombophlebitis in   the bilateral lower extremities.    Peroneal vein is not visualized right side    ICD-10:M79.89    < end of copied text >                                            --------------------------------------------------------------    PHYSICAL EXAM:  General: NAD, AOx3  HEENT: Normocephalic, atraumatic  LUNGS: Decreased bilateral breath sounds, no wheezes/crackles  HEART: S1s2, no mrg  ABDOMEN: Soft, NT/ND. No rigidity/guarding  EXT: Peripheral pulses +1, no cyanosis. +1 b/l LE edema  NEURO: grossly normal motor exam, no focal deficits  SKIN: No rashes or bruises                                           --------------------------------------------------------------

## 2022-02-17 NOTE — CONSULT NOTE ADULT - ATTENDING COMMENTS
76F with HTN, EVAN on CKD, morbid obesity, severe aortic stenosis who is here with acute on chronic blood loss anemia requiring transfusions. Capsule endoscopy performed 2/17/22 revealed bleeding in the stomach and an AVM in the duodenum. EGD is planned.     Unable to assess functional capacity as she does not ambulate. She denies active chest pain, but does develop shortness of breath with positional changes in bed. No acute heart failure or unstable arrhythmias.     EKG 2/16/22: NSR with RBBB and PAC  TTE 2/17/22: normal LVEF of 74%, severe aortic stenosis with mean gradient 49 mm Hg, valve area 0.8 cm2, mild MR    - She is high/increased risk for cardiac events given the severity of her aortic stenosis for a low risk procedure  - No further testing required, she may proceed  - Anesthesia should be made aware of severe AS  - Avoid intraoperative hypotension, vasodilators and tachycardia  - Avoid use of vasodilators   - She will eventually need structural heart evaluation when stable from bleeding standpoint, likely as an outpatient 76F with HTN, EVAN on CKD, morbid obesity, severe aortic stenosis who is here with acute on chronic blood loss anemia requiring transfusions. Capsule endoscopy performed 2/17/22 revealed bleeding in the stomach and an AVM in the duodenum. EGD is planned.     Unable to assess functional capacity as she does not ambulate. She denies active chest pain, but does develop shortness of breath with positional changes in bed. No acute heart failure or unstable arrhythmias.     EKG 2/16/22: NSR with RBBB and PAC  TTE 2/17/22: normal LVEF of 74%, severe aortic stenosis with mean gradient 49 mm Hg, valve area 0.8 cm2, mild MR    - She is high/increased risk for cardiac events given the severity of her aortic stenosis for a low risk procedure  - No further testing required, she may proceed  - Anesthesia should be made aware of severe AS  - Avoid intraoperative hypotension, vasodilators and tachycardia  - She will eventually need structural heart evaluation when stable from bleeding standpoint, likely as an outpatient

## 2022-02-17 NOTE — PROGRESS NOTE ADULT - ASSESSMENT
76 y F with PMH of severe aortic stenosis, transfusion-dependent anemia (sees Dr. Gaston, thought to be 2/2 CKD + obscure GI bleeding), HTN, morbid obesity, CKD presents with chest pain and SOB while getting a blood transfusion.    #Chronic DARRIN, pancytopenia  #Melena and hematochezia  - Patient receives Retacrit 2000 U weekly and blood transfusions every 1-2 weeks, most recently today  - EGD and colonoscopy in 2020 showed gastritis, diverticulosis, and hemorrhoids   - Hgb 6.5 on admission, s/p 1u pRBC-->7.4  - GI consult--->no indication for endoscopy evaluation at this time    #Chest pain likely 2/2 symptomatic anemia  - Troponin negative x1; currently pain-free  - EKG NSR, RBBB, no ST changes  - Trend troponin    #Chronic diastolic heart failure  #Severe AS  #Shortness of breath  - TTE 9/2021: EF 65%, grade II DD, severe AS, mod MR, mild TR  - Volume overloaded on exam  - bibasilar opacities on CXR- improved on serial imaging  - s/p Lasix IV 40 mg with pRBC transfusion  - repeat lasix IV 20mg x1    #RLE swelling; r/o DVT  - f/u [facility] duplex    #Periumbilical abdominal pain  - Last BM 4 days ago  - Abdominal US  - Miralax, senna    #CKD  - At baseline    #HTN  - Resume home metoprolol    DVT Prophylaxis: SCDs  GI Prophylaxis: Protonix IV  Diet: Clear liquids  Activity: AAT    #Progress Note Handoff  Pending (specify):  clinical improvement, repeat CBC, dupplex f/up  Family discussion: patient updated and in agreement of plan  Disposition: anticipate in the next 24-48hrs. 76 y F with PMH of severe aortic stenosis, transfusion-dependent anemia (sees Dr. Gaston, thought to be 2/2 CKD + obscure GI bleeding), HTN, morbid obesity, CKD presents with chest pain and SOB while getting a blood transfusion.    #Chronic DARRIN, pancytopenia  #Melena and hematochezia  - Patient receives Retacrit 2000 U weekly and blood transfusions every 1-2 weeks, most recently today  - EGD and colonoscopy in 2020 showed gastritis, diverticulosis, and hemorrhoids   - Hgb 6.5 on admission, s/p 1u pRBC-->7.4-->6.9-->8.9  - GI consult      -No evidence of active GI bleed      -Trend CBC, Keep Hb>7      -PPI OD       -pt c/o chest pain and SOB can be from symptomatic anemia/Fluid over load given h/o severe AS (trops negative, repeat echo pending) however r/o any cardiac causes of chest             pain) has chronic anemia       -Will do capsule endoscopy tomorrow clear liquid diet, NPO after midnight, 1/2 gallon golytely today evening        -cardiac risk stratification for EGD or enteroscopy based on capsule endoscopy findings.    #Chest pain  - likely 2/2 symptomatic anemia  - Troponin negative x1; currently pain-free  - EKG NSR, RBBB, no ST changes  - Trend troponin    #Chronic diastolic heart failure  #Severe AS  #Shortness of breath  - TTE 9/2021: EF 65%, grade II DD, severe AS, mod MR, mild TR  - Volume overloaded on exam  - bibasilar opacities on CXR- improved on serial imaging  - s/p Lasix IV 40 mg with pRBC transfusion  - repeat lasix IV 20mg x1    #RLE swelling; r/o DVT  - f/u [facility] duplex    #Periumbilical abdominal pain  - Last BM 4 days ago  - Abdominal US  - Miralax, senna    #CKD  - At baseline    #HTN  - Resume home metoprolol    DVT Prophylaxis: SCDs  GI Prophylaxis: Protonix IV  Diet: Clear liquids  Activity: AAT    #Progress Note Handoff  Pending (specify):  pill endoscopy, Cardiac Risk stratification, and EGD  Family discussion: patient updated and in agreement of plan  Disposition: unknown at this time.

## 2022-02-18 ENCOUNTER — APPOINTMENT (OUTPATIENT)
Dept: INFUSION THERAPY | Facility: CLINIC | Age: 77
End: 2022-02-18

## 2022-02-18 LAB
ALBUMIN SERPL ELPH-MCNC: 3.2 G/DL — LOW (ref 3.5–5.2)
ALBUMIN SERPL ELPH-MCNC: 3.6 G/DL — SIGNIFICANT CHANGE UP (ref 3.5–5.2)
ALP SERPL-CCNC: 73 U/L — SIGNIFICANT CHANGE UP (ref 30–115)
ALP SERPL-CCNC: 81 U/L — SIGNIFICANT CHANGE UP (ref 30–115)
ALT FLD-CCNC: 6 U/L — SIGNIFICANT CHANGE UP (ref 0–41)
ALT FLD-CCNC: <5 U/L — SIGNIFICANT CHANGE UP (ref 0–41)
ANION GAP SERPL CALC-SCNC: 16 MMOL/L — HIGH (ref 7–14)
ANION GAP SERPL CALC-SCNC: 16 MMOL/L — HIGH (ref 7–14)
AST SERPL-CCNC: 13 U/L — SIGNIFICANT CHANGE UP (ref 0–41)
AST SERPL-CCNC: 17 U/L — SIGNIFICANT CHANGE UP (ref 0–41)
BASOPHILS # BLD AUTO: 0.01 K/UL — SIGNIFICANT CHANGE UP (ref 0–0.2)
BASOPHILS # BLD AUTO: 0.02 K/UL — SIGNIFICANT CHANGE UP (ref 0–0.2)
BASOPHILS NFR BLD AUTO: 0.2 % — SIGNIFICANT CHANGE UP (ref 0–1)
BASOPHILS NFR BLD AUTO: 0.3 % — SIGNIFICANT CHANGE UP (ref 0–1)
BILIRUB SERPL-MCNC: 1.1 MG/DL — SIGNIFICANT CHANGE UP (ref 0.2–1.2)
BILIRUB SERPL-MCNC: 1.4 MG/DL — HIGH (ref 0.2–1.2)
BUN SERPL-MCNC: 68 MG/DL — CRITICAL HIGH (ref 10–20)
BUN SERPL-MCNC: 68 MG/DL — CRITICAL HIGH (ref 10–20)
CALCIUM SERPL-MCNC: 8.2 MG/DL — LOW (ref 8.5–10.1)
CALCIUM SERPL-MCNC: 8.3 MG/DL — LOW (ref 8.5–10.1)
CHLORIDE SERPL-SCNC: 108 MMOL/L — SIGNIFICANT CHANGE UP (ref 98–110)
CHLORIDE SERPL-SCNC: 109 MMOL/L — SIGNIFICANT CHANGE UP (ref 98–110)
CO2 SERPL-SCNC: 18 MMOL/L — SIGNIFICANT CHANGE UP (ref 17–32)
CO2 SERPL-SCNC: 18 MMOL/L — SIGNIFICANT CHANGE UP (ref 17–32)
CREAT SERPL-MCNC: 3.6 MG/DL — HIGH (ref 0.7–1.5)
CREAT SERPL-MCNC: 3.7 MG/DL — HIGH (ref 0.7–1.5)
EOSINOPHIL # BLD AUTO: 0.21 K/UL — SIGNIFICANT CHANGE UP (ref 0–0.7)
EOSINOPHIL # BLD AUTO: 0.37 K/UL — SIGNIFICANT CHANGE UP (ref 0–0.7)
EOSINOPHIL NFR BLD AUTO: 3.8 % — SIGNIFICANT CHANGE UP (ref 0–8)
EOSINOPHIL NFR BLD AUTO: 6.3 % — SIGNIFICANT CHANGE UP (ref 0–8)
GLUCOSE SERPL-MCNC: 68 MG/DL — LOW (ref 70–99)
GLUCOSE SERPL-MCNC: 85 MG/DL — SIGNIFICANT CHANGE UP (ref 70–99)
HCT VFR BLD CALC: 25.3 % — LOW (ref 37–47)
HCT VFR BLD CALC: 31.4 % — LOW (ref 37–47)
HGB BLD-MCNC: 10 G/DL — LOW (ref 12–16)
HGB BLD-MCNC: 7.7 G/DL — LOW (ref 12–16)
IMM GRANULOCYTES NFR BLD AUTO: 0.4 % — HIGH (ref 0.1–0.3)
IMM GRANULOCYTES NFR BLD AUTO: 0.5 % — HIGH (ref 0.1–0.3)
LYMPHOCYTES # BLD AUTO: 0.69 K/UL — LOW (ref 1.2–3.4)
LYMPHOCYTES # BLD AUTO: 1.03 K/UL — LOW (ref 1.2–3.4)
LYMPHOCYTES # BLD AUTO: 11.7 % — LOW (ref 20.5–51.1)
LYMPHOCYTES # BLD AUTO: 18.4 % — LOW (ref 20.5–51.1)
MAGNESIUM SERPL-MCNC: 2.3 MG/DL — SIGNIFICANT CHANGE UP (ref 1.8–2.4)
MCHC RBC-ENTMCNC: 28.6 PG — SIGNIFICANT CHANGE UP (ref 27–31)
MCHC RBC-ENTMCNC: 29.7 PG — SIGNIFICANT CHANGE UP (ref 27–31)
MCHC RBC-ENTMCNC: 30.4 G/DL — LOW (ref 32–37)
MCHC RBC-ENTMCNC: 31.8 G/DL — LOW (ref 32–37)
MCV RBC AUTO: 93.2 FL — SIGNIFICANT CHANGE UP (ref 81–99)
MCV RBC AUTO: 94.1 FL — SIGNIFICANT CHANGE UP (ref 81–99)
MONOCYTES # BLD AUTO: 0.46 K/UL — SIGNIFICANT CHANGE UP (ref 0.1–0.6)
MONOCYTES # BLD AUTO: 0.49 K/UL — SIGNIFICANT CHANGE UP (ref 0.1–0.6)
MONOCYTES NFR BLD AUTO: 8.2 % — SIGNIFICANT CHANGE UP (ref 1.7–9.3)
MONOCYTES NFR BLD AUTO: 8.3 % — SIGNIFICANT CHANGE UP (ref 1.7–9.3)
NEUTROPHILS # BLD AUTO: 3.87 K/UL — SIGNIFICANT CHANGE UP (ref 1.4–6.5)
NEUTROPHILS # BLD AUTO: 4.29 K/UL — SIGNIFICANT CHANGE UP (ref 1.4–6.5)
NEUTROPHILS NFR BLD AUTO: 69 % — SIGNIFICANT CHANGE UP (ref 42.2–75.2)
NEUTROPHILS NFR BLD AUTO: 72.9 % — SIGNIFICANT CHANGE UP (ref 42.2–75.2)
NRBC # BLD: 0 /100 WBCS — SIGNIFICANT CHANGE UP (ref 0–0)
NRBC # BLD: 0 /100 WBCS — SIGNIFICANT CHANGE UP (ref 0–0)
PLATELET # BLD AUTO: 100 K/UL — LOW (ref 130–400)
PLATELET # BLD AUTO: 122 K/UL — LOW (ref 130–400)
POTASSIUM SERPL-MCNC: 5.2 MMOL/L — HIGH (ref 3.5–5)
POTASSIUM SERPL-MCNC: 5.5 MMOL/L — HIGH (ref 3.5–5)
POTASSIUM SERPL-SCNC: 5.2 MMOL/L — HIGH (ref 3.5–5)
POTASSIUM SERPL-SCNC: 5.5 MMOL/L — HIGH (ref 3.5–5)
PROT SERPL-MCNC: 5.8 G/DL — LOW (ref 6–8)
PROT SERPL-MCNC: 5.9 G/DL — LOW (ref 6–8)
RBC # BLD: 2.69 M/UL — LOW (ref 4.2–5.4)
RBC # BLD: 3.37 M/UL — LOW (ref 4.2–5.4)
RBC # FLD: 17.2 % — HIGH (ref 11.5–14.5)
RBC # FLD: 17.4 % — HIGH (ref 11.5–14.5)
SODIUM SERPL-SCNC: 142 MMOL/L — SIGNIFICANT CHANGE UP (ref 135–146)
SODIUM SERPL-SCNC: 143 MMOL/L — SIGNIFICANT CHANGE UP (ref 135–146)
WBC # BLD: 5.6 K/UL — SIGNIFICANT CHANGE UP (ref 4.8–10.8)
WBC # BLD: 5.89 K/UL — SIGNIFICANT CHANGE UP (ref 4.8–10.8)
WBC # FLD AUTO: 5.6 K/UL — SIGNIFICANT CHANGE UP (ref 4.8–10.8)
WBC # FLD AUTO: 5.89 K/UL — SIGNIFICANT CHANGE UP (ref 4.8–10.8)

## 2022-02-18 PROCEDURE — 91111 GI TRC IMG INTRAL ESOPHAGUS: CPT | Mod: 26

## 2022-02-18 PROCEDURE — 99232 SBSQ HOSP IP/OBS MODERATE 35: CPT | Mod: 25

## 2022-02-18 PROCEDURE — 99233 SBSQ HOSP IP/OBS HIGH 50: CPT

## 2022-02-18 PROCEDURE — 71045 X-RAY EXAM CHEST 1 VIEW: CPT | Mod: 26

## 2022-02-18 RX ORDER — PANTOPRAZOLE SODIUM 20 MG/1
8 TABLET, DELAYED RELEASE ORAL
Qty: 80 | Refills: 0 | Status: DISCONTINUED | OUTPATIENT
Start: 2022-02-18 | End: 2022-02-19

## 2022-02-18 RX ORDER — FUROSEMIDE 40 MG
40 TABLET ORAL ONCE
Refills: 0 | Status: COMPLETED | OUTPATIENT
Start: 2022-02-18 | End: 2022-02-18

## 2022-02-18 RX ORDER — SODIUM ZIRCONIUM CYCLOSILICATE 10 G/10G
5 POWDER, FOR SUSPENSION ORAL ONCE
Refills: 0 | Status: COMPLETED | OUTPATIENT
Start: 2022-02-18 | End: 2022-02-18

## 2022-02-18 RX ORDER — DEXTROSE 50 % IN WATER 50 %
50 SYRINGE (ML) INTRAVENOUS ONCE
Refills: 0 | Status: COMPLETED | OUTPATIENT
Start: 2022-02-18 | End: 2022-02-18

## 2022-02-18 RX ORDER — INSULIN HUMAN 100 [IU]/ML
10 INJECTION, SOLUTION SUBCUTANEOUS ONCE
Refills: 0 | Status: COMPLETED | OUTPATIENT
Start: 2022-02-18 | End: 2022-02-18

## 2022-02-18 RX ORDER — LANOLIN ALCOHOL/MO/W.PET/CERES
5 CREAM (GRAM) TOPICAL AT BEDTIME
Refills: 0 | Status: DISCONTINUED | OUTPATIENT
Start: 2022-02-18 | End: 2022-02-18

## 2022-02-18 RX ADMIN — SODIUM ZIRCONIUM CYCLOSILICATE 5 GRAM(S): 10 POWDER, FOR SUSPENSION ORAL at 11:10

## 2022-02-18 RX ADMIN — Medication 50 MILLILITER(S): at 11:14

## 2022-02-18 RX ADMIN — PANTOPRAZOLE SODIUM 10 MG/HR: 20 TABLET, DELAYED RELEASE ORAL at 19:02

## 2022-02-18 RX ADMIN — Medication 25 MILLIGRAM(S): at 19:04

## 2022-02-18 RX ADMIN — PANTOPRAZOLE SODIUM 40 MILLIGRAM(S): 20 TABLET, DELAYED RELEASE ORAL at 19:03

## 2022-02-18 RX ADMIN — INSULIN HUMAN 10 UNIT(S): 100 INJECTION, SOLUTION SUBCUTANEOUS at 14:56

## 2022-02-18 RX ADMIN — PANTOPRAZOLE SODIUM 40 MILLIGRAM(S): 20 TABLET, DELAYED RELEASE ORAL at 05:05

## 2022-02-18 RX ADMIN — Medication 40 MILLIGRAM(S): at 19:03

## 2022-02-18 RX ADMIN — Medication 650 MILLIGRAM(S): at 07:12

## 2022-02-18 NOTE — PROGRESS NOTE ADULT - ASSESSMENT
76 y F with PMH of severe aortic stenosis, transfusion-dependent iron deficiency anemia (sees Dr. Gaston, thought to be 2/2 CKD + obscure GI bleeding), HTN, morbid obesity, CKD presents with chest pain and SOB while getting a blood transfusion on monda    #)Acute on chronic iron deficiency anemia  #)Capsule endoscopy positive for bleeding   #)On blood transfusions every week/other week with iron transfusions last one on 2/14  -hemodynamically stable  -Baseline Hb 7-8 admitted with Hb of 6.5 s/p total 2 units in hospitalization, last Hb before transfusion was 7.7   -Had EGD 10/2019: gastritis and colonoscopy 10/2019 good prep, pan colonic diverticulosis, hemorrhoids  -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 Paula  -US 4 mm CBD  -Capsule endoscopy positive for bleeding in the stomach and AVM in duodenum     Recs:   -Trend CBC, Keep Hb>8  -NPO   -PPI drip   -As per cardiology patient is high risk for procedure given severe AS  -Explained to the patient and her son Jace knowles ([phone])over the phone about risks and benefits of the procedure they understood risks and benefits decided to hold off on EGD at this time  -If there is any evidence of overt GI bleed with hemodynamic instability please obtain CTA  will follow    76 y F with PMH of severe aortic stenosis, transfusion-dependent iron deficiency anemia (sees Dr. Gaston, thought to be 2/2 CKD + obscure GI bleeding), HTN, morbid obesity, CKD presents with chest pain and SOB while getting a blood transfusion on monda    #)Acute on chronic iron deficiency anemia  #)Capsule endoscopy positive for bleeding   #)On blood transfusions every week/other week with iron transfusions last one on 2/14  -hemodynamically stable  -Baseline Hb 7-8 admitted with Hb of 6.5 s/p total 2 units in hospitalization, last Hb before transfusion was 7.7   -Had EGD 10/2019: gastritis and colonoscopy 10/2019 good prep, pan colonic diverticulosis, hemorrhoids  -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 Paula  -US 4 mm CBD  -Capsule endoscopy positive for bleeding in the stomach and AVM in duodenum     Recs:   -Trend CBC, Keep Hb>8  -NPO   -PPI drip   -As per cardiology patient is high risk for procedure given severe AS; pt is currently being diuresed as well  -Explained to the patient and her son Jace knowles ([phone])over the phone about risks and benefits of the procedure they understood risks and benefits decided to hold off on EGD at this time  -If there is any evidence of overt GI bleed with hemodynamic instability please obtain CTA  will follow

## 2022-02-18 NOTE — CHART NOTE - NSCHARTNOTEFT_GEN_A_CORE
We are following for anemia and GI bleed  As per cardiology patient is high risk for procedure given severe AS  Explained to the patient and her son Jace knowles ([phone])over the phone about risks and benefits of the procedure they understood risks and benefits decided to hold off on EGD at this time  NPO for now  Trend CBC, Keep Hb>8  Transfuse one unit PRBC  will follow

## 2022-02-18 NOTE — CHART NOTE - NSCHARTNOTEFT_GEN_A_CORE
s/p capsule yesterday   Capsule was positive for bleeding in the stomach and AVM in duodenum   She was on clear liquids last had around 8-8:30  Keep NPO  PPI drip   Correct hyperkalemia  give one unit PRBC  will plan for EGD today after transfusion

## 2022-02-18 NOTE — PROGRESS NOTE ADULT - SUBJECTIVE AND OBJECTIVE BOX
SUBJECTIVE  Patient is a 76y old Female who presents with a chief complaint of Chest pain, anemia, melena (17 Feb 2022 12:08)  Currently admitted to medicine with the primary diagnosis of Rectal bleeding    Today is hospital day 2d, and this morning she is _________ and reports ________ overnight events.       OBJECTIVE  PAST MEDICAL & SURGICAL HISTORY  HTN (hypertension)    Heart murmur    Eczema    Anemia    Aortic stenosis    H/O appendicitis    H/O cholecystitis      ALLERGIES:  aspirin (Rash)  latex (Unknown)  penicillins (Unknown)    MEDICATIONS:  STANDING MEDICATIONS  metoprolol tartrate 25 milliGRAM(s) Oral two times a day  pantoprazole  Injectable 40 milliGRAM(s) IV Push two times a day  polyethylene glycol 3350 17 Gram(s) Oral daily  senna 2 Tablet(s) Oral at bedtime    PRN MEDICATIONS  acetaminophen     Tablet .. 650 milliGRAM(s) Oral every 6 hours PRN  aluminum hydroxide/magnesium hydroxide/simethicone Suspension 30 milliLiter(s) Oral every 4 hours PRN  melatonin 3 milliGRAM(s) Oral at bedtime PRN      VITAL SIGNS: Last 24 Hours  T(C): 36.7 (18 Feb 2022 05:01), Max: 36.7 (18 Feb 2022 05:01)  T(F): 98.1 (18 Feb 2022 05:01), Max: 98.1 (18 Feb 2022 05:01)  HR: 65 (18 Feb 2022 05:01) (60 - 65)  BP: 94/42 (18 Feb 2022 05:01) (94/42 - 133/59)  BP(mean): --  RR: 18 (18 Feb 2022 05:01) (18 - 18)  SpO2: --    LABS:                        7.7    5.60  )-----------( 122      ( 18 Feb 2022 06:30 )             25.3     02-17    139  |  106  |  58<H>  ----------------------------<  75  5.1<H>   |  19  |  2.9<H>    Ca    8.9      17 Feb 2022 16:00  Mg     2.3     02-17    TPro  5.9<L>  /  Alb  3.4<L>  /  TBili  1.1  /  DBili  x   /  AST  14  /  ALT  6   /  AlkPhos  75  02-17                  RADIOLOGY:      PHYSICAL EXAM:    GENERAL: NAD, well-developed, AAOx3  HEENT:  Atraumatic, Normocephalic. EOMI, PERRLA, conjunctiva and sclera clear, No JVD  PULMONARY: Clear to auscultation bilaterally; No wheeze  CARDIOVASCULAR: Regular rate and rhythm; No murmurs, rubs, or gallops  GASTROINTESTINAL: Soft, Nontender, Nondistended; Bowel sounds present  MUSCULOSKELETAL:  2+ Peripheral Pulses, No clubbing, cyanosis, or edema  NEUROLOGY: non-focal  SKIN: No rashes or lesions      ADMISSION SUMMARY  76 y F with PMH of severe aortic stenosis, transfusion-dependent anemia (sees Dr. Gaston, thought to be 2/2 CKD + obscure GI bleeding), HTN, morbid obesity, CKD presents with chest pain and SOB while getting a blood transfusion.    #Chronic DARRIN, pancytopenia  #Melena and hematochezia  - Patient receives Retacrit 2000 U weekly and blood transfusions every 1-2 weeks, most recently today  - EGD and colonoscopy in 2020 showed gastritis, diverticulosis, and hemorrhoids   - Hgb 6.5 on admission, s/p 1u pRBC-->7.4  - Repeat Hb 6.9, received 1U PRBC 2/16  - GI consult--->no indication for endoscopy evaluation at this time  - Pt will undergo pill endoscopy 2/17 for eval of small bowel AVMs  - Possible EGD afterwards pending cardiac risk stratification    #Chest pain likely 2/2 symptomatic anemia  - Troponin negative x1; currently pain-free  - EKG NSR, RBBB, no ST changes  - Trend troponin, -ve x2    #Chronic diastolic heart failure  #Severe AS  #Shortness of breath  - TTE 9/2021: EF 65%, grade II DD, severe AS, mod MR, mild TR  - Volume overloaded on exam  - bibasilar opacities on CXR- improved on serial imaging  - s/p Lasix IV 40 mg with pRBC transfusion  - repeat lasix IV 20mg x1 2/16, will give lasix PRN  - Received total of 2U PRBCs so far    #RLE swelling; r/o DVT  - Duplex -ve for DVT    #Periumbilical abdominal pain  - Initially last BM 4 days prior to admission  - Abdominal US normal, no CBD dilation or liver pathology  - Miralax, senna  - s/p Golytely bowel prep for pill endoscopy 2/17    #CKD  - Baseline Cr 2.1  - Cr 2.1 -> 2.3 -> 2.6  - Will monitor for now, if worsening send urine lytes    #HTN  - c/w home metoprolol 25 bid    #Handoff: Pt here with chronic DARRIN undergoing capsule endoscopy for eval of small bowel AVMs. Possible EGD afterwards pending cardiac risk stratification.      SUBJECTIVE  Patient is a 76y old Female who presents with a chief complaint of Chest pain, anemia, melena (17 Feb 2022 12:08)  Currently admitted to medicine with the primary diagnosis of Rectal bleeding    Today is hospital day 2d, and this morning she is resting   comfortably, Capsule endoscopy showed AVMs.      OBJECTIVE  PAST MEDICAL & SURGICAL HISTORY  HTN (hypertension)    Heart murmur    Eczema    Anemia    Aortic stenosis    H/O appendicitis    H/O cholecystitis      ALLERGIES:  aspirin (Rash)  latex (Unknown)  penicillins (Unknown)    MEDICATIONS:  STANDING MEDICATIONS  metoprolol tartrate 25 milliGRAM(s) Oral two times a day  pantoprazole  Injectable 40 milliGRAM(s) IV Push two times a day  polyethylene glycol 3350 17 Gram(s) Oral daily  senna 2 Tablet(s) Oral at bedtime    PRN MEDICATIONS  acetaminophen     Tablet .. 650 milliGRAM(s) Oral every 6 hours PRN  aluminum hydroxide/magnesium hydroxide/simethicone Suspension 30 milliLiter(s) Oral every 4 hours PRN  melatonin 3 milliGRAM(s) Oral at bedtime PRN      VITAL SIGNS: Last 24 Hours  T(C): 36.7 (18 Feb 2022 05:01), Max: 36.7 (18 Feb 2022 05:01)  T(F): 98.1 (18 Feb 2022 05:01), Max: 98.1 (18 Feb 2022 05:01)  HR: 65 (18 Feb 2022 05:01) (60 - 65)  BP: 94/42 (18 Feb 2022 05:01) (94/42 - 133/59)  BP(mean): --  RR: 18 (18 Feb 2022 05:01) (18 - 18)  SpO2: --    LABS:                        7.7    5.60  )-----------( 122      ( 18 Feb 2022 06:30 )             25.3     02-17    139  |  106  |  58<H>  ----------------------------<  75  5.1<H>   |  19  |  2.9<H>    Ca    8.9      17 Feb 2022 16:00  Mg     2.3     02-17    TPro  5.9<L>  /  Alb  3.4<L>  /  TBili  1.1  /  DBili  x   /  AST  14  /  ALT  6   /  AlkPhos  75  02-17                  RADIOLOGY:      PHYSICAL EXAM:    GENERAL: NAD, well-developed, AAOx3  HEENT:  Atraumatic, Normocephalic. EOMI, PERRLA, conjunctiva and sclera clear, No JVD  PULMONARY: Clear to auscultation bilaterally; No wheeze  CARDIOVASCULAR: Regular rate and rhythm; No murmurs, rubs, or gallops  GASTROINTESTINAL: Soft, Nontender, Nondistended; Bowel sounds present  MUSCULOSKELETAL:  2+ Peripheral Pulses, No clubbing, cyanosis, or edema  NEUROLOGY: non-focal  SKIN: No rashes or lesions      ADMISSION SUMMARY  76 y F with PMH of severe aortic stenosis, transfusion-dependent anemia (sees Dr. Gaston, thought to be 2/2 CKD + obscure GI bleeding), HTN, morbid obesity, CKD presents with chest pain and SOB while getting a blood transfusion.    #Chronic DARRIN, pancytopenia  #Melena and hematochezia  Bleeding in stomach and AVMs in duodenum  - Patient receives Retacrit 2000 U weekly and blood transfusions every 1-2 weeks, most recently today  - EGD and colonoscopy in 2020 showed gastritis, diverticulosis, and hemorrhoids   - Hgb 6.5 on admission, s/p 1u pRBC-->7.4  - Repeat Hb 6.9, received 1U PRBC 2/16  - hb 7.7, will give one unit of prbc today. repeat CBC  - Consider giving Lasix post blood transfusion   - NPO for now, started on PPI drip.    #Chest pain likely 2/2 symptomatic anemia  - Troponin negative x1; currently pain-free  - EKG NSR, RBBB, no ST changes  - Trend troponin, -ve x2    #EVAN on top of CKD  - Baseline Cr 2.1  - Creatinine Trend: 3.6<--, 2.9<--, 2.6<--, 2.3<--, 2.1<--, 2.1<--  - urine lytes for now.   - US kidney  - Strict Is/Os    #Chronic diastolic heart failure  #Severe AS  #Shortness of breath  - TTE 9/2021: EF 65%, grade II DD, severe AS, mod MR, mild TR  - repeat TTE 2/21 showed EF 74%, Mild Phtn, mild MR, severe AS  - Volume overloaded on exam  - bibasilar opacities on CXR- improved on serial imaging  - s/p Lasix IV 40 mg with pRBC transfusion  - repeat lasix IV 20mg x1 2/16, will give lasix PRN  - Received total of 2U PRBCs so far    #RLE swelling; r/o DVT  - Duplex -ve for DVT    #Periumbilical abdominal pain  - Initially last BM 4 days prior to admission  - Abdominal US normal, no CBD dilation or liver pathology  - Miralax, senna  - s/p Golytely bowel prep for pill endoscopy 2/17      #HTN  - c/w home metoprolol 25 bid    #Handoff: Pt here with chronic DARRIN undergoing capsule endoscopy for eval of small bowel AVMs. Possible EGD afterwards pending cardiac risk stratification.

## 2022-02-18 NOTE — PROGRESS NOTE ADULT - SUBJECTIVE AND OBJECTIVE BOX
Gastroenterology progress note:     Patient is a 76y old  Female who presents with a chief complaint of Chest pain, anemia, melena (18 Feb 2022 08:29)       Admitted on: 02-16-22    We are following the patient for Anemia      Interval History:  Had one BM dark yesterday   No abd pain, nausea, vomiting        PAST MEDICAL & SURGICAL HISTORY:  HTN (hypertension)    Heart murmur    Eczema    Anemia    Aortic stenosis    H/O appendicitis    H/O cholecystitis        MEDICATIONS  (STANDING):  furosemide   Injectable 40 milliGRAM(s) IV Push once  insulin regular  human recombinant 10 Unit(s) IV Push once  metoprolol tartrate 25 milliGRAM(s) Oral two times a day  pantoprazole  Injectable 40 milliGRAM(s) IV Push two times a day  pantoprazole Infusion 8 mG/Hr (10 mL/Hr) IV Continuous <Continuous>  polyethylene glycol 3350 17 Gram(s) Oral daily  senna 2 Tablet(s) Oral at bedtime    MEDICATIONS  (PRN):  acetaminophen     Tablet .. 650 milliGRAM(s) Oral every 6 hours PRN Temp greater or equal to 38C (100.4F), Mild Pain (1 - 3)  aluminum hydroxide/magnesium hydroxide/simethicone Suspension 30 milliLiter(s) Oral every 4 hours PRN Dyspepsia  melatonin 3 milliGRAM(s) Oral at bedtime PRN Insomnia      Allergies  aspirin (Rash)  latex (Unknown)  penicillins (Unknown)      Review of Systems:   Cardiovascular:  No Chest Pain, No Palpitations  Respiratory:  No Cough, No Dyspnea  Gastrointestinal:  As described in HPI    Physical Examination:  T(C): 35.6 (02-18-22 @ 12:00), Max: 36.7 (02-18-22 @ 05:01)  HR: 63 (02-18-22 @ 12:00) (60 - 65)  BP: 120/57 (02-18-22 @ 12:00) (94/42 - 123/56)  RR: 18 (02-18-22 @ 12:00) (18 - 18)  SpO2: --      02-17-22 @ 07:01  -  02-18-22 @ 07:00  --------------------------------------------------------  IN: 0 mL / OUT: 2 mL / NET: -2 mL      Constitutional: No acute distress.  Respiratory:  No signs of respiratory distress. Lung sounds are clear bilaterally.  Cardiovascular:  S1 S2, Regular rate and rhythm.  Abdominal: Abdomen is soft, symmetric, and non-tender without distention.   Skin: No rashes, No Jaundice.        Data:                        7.7    5.60  )-----------( 122      ( 18 Feb 2022 06:30 )             25.3     Hgb trend:  7.7  02-18-22 @ 06:30  8.9  02-17-22 @ 04:30  6.9  02-16-22 @ 16:00  7.4  02-16-22 @ 05:43  6.5  02-15-22 @ 19:09      02-16-22 @ 07:01  -  02-17-22 @ 07:00  --------------------------------------------------------  IN: 268 mL      02-18    143  |  109  |  68<HH>  ----------------------------<  68<L>  5.5<H>   |  18  |  3.6<H>    Ca    8.2<L>      18 Feb 2022 06:30  Mg     2.3     02-18    TPro  5.8<L>  /  Alb  3.2<L>  /  TBili  1.1  /  DBili  x   /  AST  13  /  ALT  6   /  AlkPhos  73  02-18    Liver panel trend:  TBili 1.1   /   AST 13   /   ALT 6   /   AlkP 73   /   Tptn 5.8   /   Alb 3.2    /   DBili --      02-18  TBili 1.1   /   AST 14   /   ALT 6   /   AlkP 75   /   Tptn 5.9   /   Alb 3.4    /   DBili --      02-17  TBili 1.3   /   AST 22   /   ALT 6   /   AlkP 82   /   Tptn 6.2   /   Alb 3.6    /   DBili --      02-17  TBili 1.1   /   AST 15   /   ALT 7   /   AlkP 91   /   Tptn 6.8   /   Alb 3.7    /   DBili --      02-16  TBili 1.1   /   AST 24   /   ALT 7   /   AlkP 81   /   Tptn 5.9   /   Alb 3.3    /   DBili --      02-15             Radiology:       Gastroenterology progress note:     Patient is a 76y old  Female who presents with a chief complaint of Chest pain, anemia, melena (18 Feb 2022 08:29)       Admitted on: 02-16-22    We are following the patient for Anemia      Interval History:  Had one BM dark yesterday, Hgb stable   No abd pain, nausea, vomiting        PAST MEDICAL & SURGICAL HISTORY:  HTN (hypertension)    Heart murmur    Eczema    Anemia    Aortic stenosis    H/O appendicitis    H/O cholecystitis        MEDICATIONS  (STANDING):  furosemide   Injectable 40 milliGRAM(s) IV Push once  insulin regular  human recombinant 10 Unit(s) IV Push once  metoprolol tartrate 25 milliGRAM(s) Oral two times a day  pantoprazole  Injectable 40 milliGRAM(s) IV Push two times a day  pantoprazole Infusion 8 mG/Hr (10 mL/Hr) IV Continuous <Continuous>  polyethylene glycol 3350 17 Gram(s) Oral daily  senna 2 Tablet(s) Oral at bedtime    MEDICATIONS  (PRN):  acetaminophen     Tablet .. 650 milliGRAM(s) Oral every 6 hours PRN Temp greater or equal to 38C (100.4F), Mild Pain (1 - 3)  aluminum hydroxide/magnesium hydroxide/simethicone Suspension 30 milliLiter(s) Oral every 4 hours PRN Dyspepsia  melatonin 3 milliGRAM(s) Oral at bedtime PRN Insomnia      Allergies  aspirin (Rash)  latex (Unknown)  penicillins (Unknown)      Review of Systems:   Cardiovascular:  No Chest Pain, No Palpitations  Respiratory:  No Cough, No Dyspnea  Gastrointestinal:  As described in HPI    Physical Examination:  T(C): 35.6 (02-18-22 @ 12:00), Max: 36.7 (02-18-22 @ 05:01)  HR: 63 (02-18-22 @ 12:00) (60 - 65)  BP: 120/57 (02-18-22 @ 12:00) (94/42 - 123/56)  RR: 18 (02-18-22 @ 12:00) (18 - 18)  SpO2: --      02-17-22 @ 07:01  -  02-18-22 @ 07:00  --------------------------------------------------------  IN: 0 mL / OUT: 2 mL / NET: -2 mL      Constitutional: No acute distress.  Respiratory:  No signs of respiratory distress. Lung sounds are clear bilaterally.  Cardiovascular:  S1 S2, Regular rate and rhythm.  Abdominal: Abdomen is soft, symmetric, and non-tender without distention.   Skin: No rashes, No Jaundice.        Data:                        7.7    5.60  )-----------( 122      ( 18 Feb 2022 06:30 )             25.3     Hgb trend:  7.7  02-18-22 @ 06:30  8.9  02-17-22 @ 04:30  6.9  02-16-22 @ 16:00  7.4  02-16-22 @ 05:43  6.5  02-15-22 @ 19:09      02-16-22 @ 07:01  -  02-17-22 @ 07:00  --------------------------------------------------------  IN: 268 mL      02-18    143  |  109  |  68<HH>  ----------------------------<  68<L>  5.5<H>   |  18  |  3.6<H>    Ca    8.2<L>      18 Feb 2022 06:30  Mg     2.3     02-18    TPro  5.8<L>  /  Alb  3.2<L>  /  TBili  1.1  /  DBili  x   /  AST  13  /  ALT  6   /  AlkPhos  73  02-18    Liver panel trend:  TBili 1.1   /   AST 13   /   ALT 6   /   AlkP 73   /   Tptn 5.8   /   Alb 3.2    /   DBili --      02-18  TBili 1.1   /   AST 14   /   ALT 6   /   AlkP 75   /   Tptn 5.9   /   Alb 3.4    /   DBili --      02-17  TBili 1.3   /   AST 22   /   ALT 6   /   AlkP 82   /   Tptn 6.2   /   Alb 3.6    /   DBili --      02-17  TBili 1.1   /   AST 15   /   ALT 7   /   AlkP 91   /   Tptn 6.8   /   Alb 3.7    /   DBili --      02-16  TBili 1.1   /   AST 24   /   ALT 7   /   AlkP 81   /   Tptn 5.9   /   Alb 3.3    /   DBili --      02-15             Radiology:

## 2022-02-19 LAB
ALBUMIN SERPL ELPH-MCNC: 3.3 G/DL — LOW (ref 3.5–5.2)
ALP SERPL-CCNC: 76 U/L — SIGNIFICANT CHANGE UP (ref 30–115)
ALT FLD-CCNC: 6 U/L — SIGNIFICANT CHANGE UP (ref 0–41)
ANION GAP SERPL CALC-SCNC: 12 MMOL/L — SIGNIFICANT CHANGE UP (ref 7–14)
AST SERPL-CCNC: 11 U/L — SIGNIFICANT CHANGE UP (ref 0–41)
BASOPHILS # BLD AUTO: 0.01 K/UL — SIGNIFICANT CHANGE UP (ref 0–0.2)
BASOPHILS NFR BLD AUTO: 0.2 % — SIGNIFICANT CHANGE UP (ref 0–1)
BILIRUB SERPL-MCNC: 1.5 MG/DL — HIGH (ref 0.2–1.2)
BUN SERPL-MCNC: 70 MG/DL — CRITICAL HIGH (ref 10–20)
CALCIUM SERPL-MCNC: 8.3 MG/DL — LOW (ref 8.5–10.1)
CHLORIDE SERPL-SCNC: 108 MMOL/L — SIGNIFICANT CHANGE UP (ref 98–110)
CO2 SERPL-SCNC: 24 MMOL/L — SIGNIFICANT CHANGE UP (ref 17–32)
CREAT ?TM UR-MCNC: 50 MG/DL — SIGNIFICANT CHANGE UP
CREAT SERPL-MCNC: 3.9 MG/DL — HIGH (ref 0.7–1.5)
EOSINOPHIL # BLD AUTO: 0.35 K/UL — SIGNIFICANT CHANGE UP (ref 0–0.7)
EOSINOPHIL NFR BLD AUTO: 7.1 % — SIGNIFICANT CHANGE UP (ref 0–8)
GLUCOSE SERPL-MCNC: 88 MG/DL — SIGNIFICANT CHANGE UP (ref 70–99)
HCT VFR BLD CALC: 30 % — LOW (ref 37–47)
HGB BLD-MCNC: 9.4 G/DL — LOW (ref 12–16)
IMM GRANULOCYTES NFR BLD AUTO: 0.4 % — HIGH (ref 0.1–0.3)
LYMPHOCYTES # BLD AUTO: 0.66 K/UL — LOW (ref 1.2–3.4)
LYMPHOCYTES # BLD AUTO: 13.4 % — LOW (ref 20.5–51.1)
MAGNESIUM SERPL-MCNC: 2.2 MG/DL — SIGNIFICANT CHANGE UP (ref 1.8–2.4)
MCHC RBC-ENTMCNC: 29.8 PG — SIGNIFICANT CHANGE UP (ref 27–31)
MCHC RBC-ENTMCNC: 31.3 G/DL — LOW (ref 32–37)
MCV RBC AUTO: 95.2 FL — SIGNIFICANT CHANGE UP (ref 81–99)
MONOCYTES # BLD AUTO: 0.47 K/UL — SIGNIFICANT CHANGE UP (ref 0.1–0.6)
MONOCYTES NFR BLD AUTO: 9.5 % — HIGH (ref 1.7–9.3)
NEUTROPHILS # BLD AUTO: 3.42 K/UL — SIGNIFICANT CHANGE UP (ref 1.4–6.5)
NEUTROPHILS NFR BLD AUTO: 69.4 % — SIGNIFICANT CHANGE UP (ref 42.2–75.2)
NRBC # BLD: 0 /100 WBCS — SIGNIFICANT CHANGE UP (ref 0–0)
PLATELET # BLD AUTO: 112 K/UL — LOW (ref 130–400)
POTASSIUM SERPL-MCNC: 5 MMOL/L — SIGNIFICANT CHANGE UP (ref 3.5–5)
POTASSIUM SERPL-SCNC: 5 MMOL/L — SIGNIFICANT CHANGE UP (ref 3.5–5)
PROT SERPL-MCNC: 5.9 G/DL — LOW (ref 6–8)
RBC # BLD: 3.15 M/UL — LOW (ref 4.2–5.4)
RBC # FLD: 18 % — HIGH (ref 11.5–14.5)
SODIUM SERPL-SCNC: 144 MMOL/L — SIGNIFICANT CHANGE UP (ref 135–146)
SODIUM UR-SCNC: 83 MMOL/L — SIGNIFICANT CHANGE UP
WBC # BLD: 4.93 K/UL — SIGNIFICANT CHANGE UP (ref 4.8–10.8)
WBC # FLD AUTO: 4.93 K/UL — SIGNIFICANT CHANGE UP (ref 4.8–10.8)

## 2022-02-19 PROCEDURE — 99232 SBSQ HOSP IP/OBS MODERATE 35: CPT

## 2022-02-19 PROCEDURE — 99233 SBSQ HOSP IP/OBS HIGH 50: CPT

## 2022-02-19 PROCEDURE — 76770 US EXAM ABDO BACK WALL COMP: CPT | Mod: 26

## 2022-02-19 RX ORDER — LIDOCAINE 4 G/100G
1 CREAM TOPICAL DAILY
Refills: 0 | Status: DISCONTINUED | OUTPATIENT
Start: 2022-02-19 | End: 2022-02-19

## 2022-02-19 RX ORDER — TRAMADOL HYDROCHLORIDE 50 MG/1
25 TABLET ORAL EVERY 6 HOURS
Refills: 0 | Status: DISCONTINUED | OUTPATIENT
Start: 2022-02-19 | End: 2022-02-19

## 2022-02-19 RX ORDER — SODIUM CHLORIDE 9 MG/ML
500 INJECTION, SOLUTION INTRAVENOUS ONCE
Refills: 0 | Status: COMPLETED | OUTPATIENT
Start: 2022-02-19 | End: 2022-02-19

## 2022-02-19 RX ORDER — PANTOPRAZOLE SODIUM 20 MG/1
40 TABLET, DELAYED RELEASE ORAL
Refills: 0 | Status: DISCONTINUED | OUTPATIENT
Start: 2022-02-19 | End: 2022-02-23

## 2022-02-19 RX ADMIN — Medication 25 MILLIGRAM(S): at 17:14

## 2022-02-19 RX ADMIN — SENNA PLUS 2 TABLET(S): 8.6 TABLET ORAL at 22:20

## 2022-02-19 RX ADMIN — PANTOPRAZOLE SODIUM 10 MG/HR: 20 TABLET, DELAYED RELEASE ORAL at 04:00

## 2022-02-19 RX ADMIN — Medication 25 MILLIGRAM(S): at 05:29

## 2022-02-19 RX ADMIN — PANTOPRAZOLE SODIUM 40 MILLIGRAM(S): 20 TABLET, DELAYED RELEASE ORAL at 17:14

## 2022-02-19 RX ADMIN — PANTOPRAZOLE SODIUM 40 MILLIGRAM(S): 20 TABLET, DELAYED RELEASE ORAL at 05:29

## 2022-02-19 RX ADMIN — SODIUM CHLORIDE 125 MILLILITER(S): 9 INJECTION, SOLUTION INTRAVENOUS at 16:58

## 2022-02-19 NOTE — PHYSICAL THERAPY INITIAL EVALUATION ADULT - GENERAL OBSERVATIONS, REHAB EVAL
Pt rec'd semi fowlers, PCA Jaime at bedside, NAD, pt agreeable to PT session, +culver +IV. 9:35-10:00

## 2022-02-19 NOTE — PROVIDER CONTACT NOTE (OTHER) - SITUATION
DAKOTA Vasquez notified about patient wanting to know when the culver can be discontinued and wants to speak to MD.  PA states 'okay.'

## 2022-02-19 NOTE — PHYSICAL THERAPY INITIAL EVALUATION ADULT - PERTINENT HX OF CURRENT PROBLEM, REHAB EVAL
76 y F with PMH of severe aortic stenosis, transfusion-dependent anemia (sees Dr. Gaston, thought to be 2/2 CKD + obscure GI bleeding), HTN, morbid obesity, CKD presents with chest pain and SOB while getting a blood transfusion; also c/o blood in stools, R leg swelling, abdominal pain.

## 2022-02-19 NOTE — PROGRESS NOTE ADULT - SUBJECTIVE AND OBJECTIVE BOX
Gastroenterology progress note:     Patient is a 76y old  Female who presents with a chief complaint of Chest pain, anemia, melena (19 Feb 2022 09:50)       Admitted on: 02-16-22    We are following the patient for: GI bleed      Interval History: no bloody bowel movements reported overnight. HGB stable. Patient denies GI complaints     Patient's medical problems are improving/stable/not improving/unstable/   Prior records reviewed (Y/N):Y  History obtained from someone other than patient (Y/N): Y      PAST MEDICAL & SURGICAL HISTORY:  HTN (hypertension)    Heart murmur    Eczema    Anemia    Aortic stenosis    H/O appendicitis    H/O cholecystitis        MEDICATIONS  (STANDING):  metoprolol tartrate 25 milliGRAM(s) Oral two times a day  pantoprazole  Injectable 40 milliGRAM(s) IV Push two times a day  polyethylene glycol 3350 17 Gram(s) Oral daily  senna 2 Tablet(s) Oral at bedtime    MEDICATIONS  (PRN):  acetaminophen     Tablet .. 650 milliGRAM(s) Oral every 6 hours PRN Temp greater or equal to 38C (100.4F), Mild Pain (1 - 3)  aluminum hydroxide/magnesium hydroxide/simethicone Suspension 30 milliLiter(s) Oral every 4 hours PRN Dyspepsia  melatonin 3 milliGRAM(s) Oral at bedtime PRN Insomnia      Allergies  aspirin (Rash)  latex (Unknown)  penicillins (Unknown)      Review of Systems:   Cardiovascular:  No Chest Pain, No Palpitations  Respiratory:  No Cough, No Dyspnea  Gastrointestinal:  As described in HPI    Physical Examination:  T(C): 35.6 (02-19-22 @ 13:03), Max: 36.7 (02-18-22 @ 20:58)  HR: 66 (02-19-22 @ 13:03) (66 - 72)  BP: 153/65 (02-19-22 @ 13:03) (142/63 - 153/65)  RR: 18 (02-19-22 @ 13:03) (18 - 18)  SpO2: 93% (02-19-22 @ 13:03) (93% - 95%)      02-18-22 @ 07:01  -  02-19-22 @ 07:00  --------------------------------------------------------  IN: 0 mL / OUT: 1900 mL / NET: -1900 mL      Constitutional: No acute distress.  Respiratory:  No signs of respiratory distress. Lung sounds are clear bilaterally.  Cardiovascular:  S1 S2, Regular rate and rhythm.  Abdominal: Abdomen is soft, symmetric, and non-tender without distention. Bowel sounds are present and normoactive in all four quadrants.   Skin: No rashes, No Jaundice.        Data: (reviewed by attending)                        9.4    4.93  )-----------( 112      ( 19 Feb 2022 04:30 )             30.0     Hgb trend:  9.4  02-19-22 @ 04:30  10.0  02-18-22 @ 20:00  7.7  02-18-22 @ 06:30  8.9  02-17-22 @ 04:30  6.9  02-16-22 @ 16:00      02-16-22 @ 07:01  -  02-17-22 @ 07:00  --------------------------------------------------------  IN: 268 mL      02-19    144  |  108  |  70<HH>  ----------------------------<  88  5.0   |  24  |  3.9<H>    Ca    8.3<L>      19 Feb 2022 04:30  Mg     2.2     02-19    TPro  5.9<L>  /  Alb  3.3<L>  /  TBili  1.5<H>  /  DBili  x   /  AST  11  /  ALT  6   /  AlkPhos  76  02-19    Liver panel trend:  TBili 1.5   /   AST 11   /   ALT 6   /   AlkP 76   /   Tptn 5.9   /   Alb 3.3    /   DBili --      02-19  TBili 1.4   /   AST 17   /   ALT <5   /   AlkP 81   /   Tptn 5.9   /   Alb 3.6    /   DBili --      02-18  TBili 1.1   /   AST 13   /   ALT 6   /   AlkP 73   /   Tptn 5.8   /   Alb 3.2    /   DBili --      02-18  TBili 1.1   /   AST 14   /   ALT 6   /   AlkP 75   /   Tptn 5.9   /   Alb 3.4    /   DBili --      02-17  TBili 1.3   /   AST 22   /   ALT 6   /   AlkP 82   /   Tptn 6.2   /   Alb 3.6    /   DBili --      02-17  TBili 1.1   /   AST 15   /   ALT 7   /   AlkP 91   /   Tptn 6.8   /   Alb 3.7    /   DBili --      02-16  TBili 1.1   /   AST 24   /   ALT 7   /   AlkP 81   /   Tptn 5.9   /   Alb 3.3    /   DBili --      02-15             Radiology: (reviewed by attending)

## 2022-02-19 NOTE — PHYSICAL THERAPY INITIAL EVALUATION ADULT - LIVES WITH, PROFILE
Pt lives with spouse in house with 3 TRISTON and 4 steps inside. Pt admitted from [facility],  currently there awaiting discharge./spouse

## 2022-02-19 NOTE — PROGRESS NOTE ADULT - ASSESSMENT
76 y F with PMH of severe aortic stenosis, transfusion-dependent iron deficiency anemia (sees Dr. 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 Paula  -US 4 mm CBD  -Capsule endoscopy positive for bleeding in the stomach and AVM in duodenum     Recs:   -Trend CBC, Keep Hb>8  -advance diet as tolerated   -PPI 40 IV bid   -As per cardiology patient is high risk for procedure given severe AS; pt is currently being diuresed as well  -Explained to the patient and her son Jace knowles ([phone])over the phone about risks and benefits of the procedure they understood risks and benefits decided to hold off on EGD at this time  -If there is any evidence of overt GI bleed with hemodynamic instability please obtain CTA  will follow

## 2022-02-19 NOTE — PROGRESS NOTE ADULT - ASSESSMENT
76 y F with PMH of severe aortic stenosis, transfusion-dependent anemia (sees Dr. Gaston, thought to be 2/2 CKD + obscure GI bleeding), HTN, morbid obesity, CKD presents with chest pain and SOB while getting a blood transfusion.    #Chronic DARRIN, pancytopenia  #Melena and hematochezia  - Patient receives Retacrit 2000 U weekly and blood transfusions every 1-2 weeks, most recently today  - EGD and colonoscopy in 2020 showed gastritis, diverticulosis, and hemorrhoids   - Hgb 6.5 on admission, s/p 1u pRBC-->7.4-->6.9-->8.9-->7.7->s/p 1u pRBC-->10.1-->9.4  - GI consult       s/p capsule- positive for bleeding in the stomach and AVM in duodenum        Keep NPO  - high risk for anesthesia so GI would like to monitor CBC over the weekend to determine risk vs benefit of EGD intervention      # EVAN on CKD  - baseline ~1.9-2.2  - currently 3.6-->3.9  - unsure etiology  - Renal US unremarkable as above  - urine lytes ordered  - Neph c/s  - have to be judicious with IVF as patient is getting unit of blood and has severe AS    # Acute hypoxic respiratory failure  - CXR on admission b/l opacities  - likely 2/2 to TACO in setting of recent blood transfusion  - s/p lasix 40mg IV x1 and repeat 40mg IV x1  - serial CXR  - wean to RA      #Chest pain  - likely 2/2 symptomatic anemia  - Troponin negative x1; currently pain-free  - EKG NSR, RBBB, no ST changes  - Trend troponin    #Chronic diastolic heart failure  #Severe AS  #Shortness of breath  - TTE 9/2021: EF 65%, grade II DD, severe AS, mod MR, mild TR  - Volume overloaded on exam  - bibasilar opacities on CXR- improved on serial imaging  - s/p Lasix IV 40 mg with pRBC transfusion an additional 20mg IV x1; improved    #RLE swelling; r/o DVT  - VA Duplex Lower Ext Vein Scan, Bilat (02.15.22 @ 21:40)-No evidence of deep venous thrombosis or superficial thrombophlebitis in   the bilateral lower extremities. Peroneal vein is not visualized right side    #Periumbilical abdominal pain  - Last BM 4 days ago  - Abdominal US  - Miralax, senna    #HTN  - Resume home metoprolol    DVT Prophylaxis: SCDs  GI Prophylaxis: Protonix IV  Diet: Clear liquids  Activity: AAT    #Progress Note Handoff  Pending (specify):  monitor CBC over weekend; GI f/up for possible EGD on Tuesday. PT eval  Family discussion: patient updated and in agreement of plan  Disposition: unknown at this time.

## 2022-02-19 NOTE — PHYSICAL THERAPY INITIAL EVALUATION ADULT - BED MOBILITY TRAINING, PT EVAL
Pt will demonstrate rolling both directions with independence without use of bed rails by discharge.

## 2022-02-19 NOTE — PROGRESS NOTE ADULT - SUBJECTIVE AND OBJECTIVE BOX
LATRICIA ARREAGA  76y  Female      Patient is a 76y old  Female who presents with a chief complaint of Chest pain, anemia, melena (18 Feb 2022 17:32)      INTERVAL HPI/OVERNIGHT EVENTS: no acute events overnight. no melena or hematochezia reported. s/p transfusion of 1u pRBC followed by lasix 40mg IV x1. tolerated well.       REVIEW OF SYSTEMS:  CONSTITUTIONAL: No fever, weight loss, or fatigue  RESPIRATORY: No cough, wheezing, chills or hemoptysis; No shortness of breath  CARDIOVASCULAR: No chest pain, palpitations, dizziness, or leg swelling  GENITOURINARY: No dysuria, frequency, hematuria, or incontinence  NEUROLOGICAL: No headaches, memory loss, loss of strength, numbness, or tremors  SKIN: No itching, burning, rashes, or lesions   MUSCULOSKELETAL: No joint pain or swelling; No muscle, back, or extremity pain  PSYCHIATRIC: No depression, anxiety, mood swings, or difficulty sleeping  All other review of systems negative    VITALS  T(C): 36.2 (02-19-22 @ 06:00), Max: 36.7 (02-18-22 @ 20:58)  HR: 72 (02-19-22 @ 06:00) (63 - 72)  BP: 143/62 (02-19-22 @ 06:00) (120/57 - 143/62)  RR: 18 (02-19-22 @ 06:00) (18 - 18)  SpO2: 94% (02-19-22 @ 06:00) (94% - 95%)  Wt(kg): --Vital Signs Last 24 Hrs  T(C): 36.2 (19 Feb 2022 06:00), Max: 36.7 (18 Feb 2022 20:58)  T(F): 97.2 (19 Feb 2022 06:00), Max: 98 (18 Feb 2022 20:58)  HR: 72 (19 Feb 2022 06:00) (63 - 72)  BP: 143/62 (19 Feb 2022 06:00) (120/57 - 143/62)  BP(mean): --  RR: 18 (19 Feb 2022 06:00) (18 - 18)  SpO2: 94% (19 Feb 2022 06:00) (94% - 95%)      02-18-22 @ 07:01  -  02-19-22 @ 07:00  --------------------------------------------------------  IN: 0 mL / OUT: 1900 mL / NET: -1900 mL        PHYSICAL EXAM:  GENERAL: elderly F, NAD, non toxic appearing  EYES: anicteric sclera, non injected conjunctiva, EOMI  ENT: poor dentition, dry MM  PSYCH: no agitation, baseline mentation  NERVOUS SYSTEM:  Alert & Oriented X3, CN 2-12 grossly intact  PULMONARY: Clear to percussion bilaterally; No rales, rhonchi, wheezing, or rubs  CARDIOVASCULAR: Regular rate and rhythm; No murmurs, rubs, or gallops  GI: Soft, Nontender, Nondistended; Bowel sounds present  EXTREMITIES:  2+ Peripheral Pulses, No clubbing, cyanosis, or edema  SKIN: No rashes or lesions      Consultant(s) Notes Reviewed:  [x ] YES  [ ] NO    Discussed with Consultants/Other Providers [ x] YES     LABS                          9.4    4.93  )-----------( 112      ( 19 Feb 2022 04:30 )             30.0     02-19    144  |  108  |  70<HH>  ----------------------------<  88  5.0   |  24  |  3.9<H>    Ca    8.3<L>      19 Feb 2022 04:30  Mg     2.2     02-19    TPro  5.9<L>  /  Alb  3.3<L>  /  TBili  1.5<H>  /  DBili  x   /  AST  11  /  ALT  6   /  AlkPhos  76  02-19      POCT Blood Glucose.: 147 mg/dL (17 Feb 2022 21:27)      RADIOLOGY & ADDITIONAL TESTS:  US Kidney and Bladder (02.19.22 @ 00:34) >  IMPRESSION:  No right hydronephrosis.  Left kidney is not visualized.  Limited evaluation of decompressed urinary bladder.    Xray Chest 1 View- PORTABLE-Urgent (Xray Chest 1 View- PORTABLE-Urgent .) (02.18.22 @ 12:41) >  Impression:  Bilateral opacities without significant change    Imaging Personally Reviewed:  [ ] YES  [ ] NO    HEALTH ISSUES - PROBLEM Dx:  Suspected deep vein thrombosis (DVT)    MEDICATIONS  (STANDING):  metoprolol tartrate 25 milliGRAM(s) Oral two times a day  pantoprazole  Injectable 40 milliGRAM(s) IV Push two times a day  pantoprazole Infusion 8 mG/Hr (10 mL/Hr) IV Continuous <Continuous>  polyethylene glycol 3350 17 Gram(s) Oral daily  senna 2 Tablet(s) Oral at bedtime    MEDICATIONS  (PRN):  acetaminophen     Tablet .. 650 milliGRAM(s) Oral every 6 hours PRN Temp greater or equal to 38C (100.4F), Mild Pain (1 - 3)  aluminum hydroxide/magnesium hydroxide/simethicone Suspension 30 milliLiter(s) Oral every 4 hours PRN Dyspepsia  melatonin 3 milliGRAM(s) Oral at bedtime PRN Insomnia

## 2022-02-19 NOTE — PHYSICAL THERAPY INITIAL EVALUATION ADULT - GAIT DISTANCE, PT EVAL
3x20 marches in place with seated rest breaks between trials 2/2 pt reporting 'wooziness' which pt states has happened in the past however VSS /63, 164/67 SpO2 >92%

## 2022-02-20 LAB
ALBUMIN SERPL ELPH-MCNC: 3.3 G/DL — LOW (ref 3.5–5.2)
ALP SERPL-CCNC: 72 U/L — SIGNIFICANT CHANGE UP (ref 30–115)
ALT FLD-CCNC: 6 U/L — SIGNIFICANT CHANGE UP (ref 0–41)
ANION GAP SERPL CALC-SCNC: 8 MMOL/L — SIGNIFICANT CHANGE UP (ref 7–14)
AST SERPL-CCNC: 12 U/L — SIGNIFICANT CHANGE UP (ref 0–41)
BILIRUB SERPL-MCNC: 1.3 MG/DL — HIGH (ref 0.2–1.2)
BLD GP AB SCN SERPL QL: SIGNIFICANT CHANGE UP
BUN SERPL-MCNC: 62 MG/DL — CRITICAL HIGH (ref 10–20)
CALCIUM SERPL-MCNC: 8.1 MG/DL — LOW (ref 8.5–10.1)
CHLORIDE SERPL-SCNC: 110 MMOL/L — SIGNIFICANT CHANGE UP (ref 98–110)
CO2 SERPL-SCNC: 24 MMOL/L — SIGNIFICANT CHANGE UP (ref 17–32)
CREAT SERPL-MCNC: 3.4 MG/DL — HIGH (ref 0.7–1.5)
GLUCOSE SERPL-MCNC: 118 MG/DL — HIGH (ref 70–99)
HCT VFR BLD CALC: 30.5 % — LOW (ref 37–47)
HGB BLD-MCNC: 9.4 G/DL — LOW (ref 12–16)
MCHC RBC-ENTMCNC: 29.5 PG — SIGNIFICANT CHANGE UP (ref 27–31)
MCHC RBC-ENTMCNC: 30.8 G/DL — LOW (ref 32–37)
MCV RBC AUTO: 95.6 FL — SIGNIFICANT CHANGE UP (ref 81–99)
NRBC # BLD: 0 /100 WBCS — SIGNIFICANT CHANGE UP (ref 0–0)
PLATELET # BLD AUTO: 113 K/UL — LOW (ref 130–400)
POTASSIUM SERPL-MCNC: 4.8 MMOL/L — SIGNIFICANT CHANGE UP (ref 3.5–5)
POTASSIUM SERPL-SCNC: 4.8 MMOL/L — SIGNIFICANT CHANGE UP (ref 3.5–5)
PROT SERPL-MCNC: 5.6 G/DL — LOW (ref 6–8)
RBC # BLD: 3.19 M/UL — LOW (ref 4.2–5.4)
RBC # FLD: 18 % — HIGH (ref 11.5–14.5)
SODIUM SERPL-SCNC: 142 MMOL/L — SIGNIFICANT CHANGE UP (ref 135–146)
UUN UR-MCNC: 449 MG/DL — SIGNIFICANT CHANGE UP
WBC # BLD: 4.8 K/UL — SIGNIFICANT CHANGE UP (ref 4.8–10.8)
WBC # FLD AUTO: 4.8 K/UL — SIGNIFICANT CHANGE UP (ref 4.8–10.8)

## 2022-02-20 PROCEDURE — 99233 SBSQ HOSP IP/OBS HIGH 50: CPT

## 2022-02-20 PROCEDURE — 71045 X-RAY EXAM CHEST 1 VIEW: CPT | Mod: 26

## 2022-02-20 RX ORDER — CARBAMIDE PEROXIDE 81.86 MG/ML
1 SOLUTION/ DROPS AURICULAR (OTIC) ONCE
Refills: 0 | Status: COMPLETED | OUTPATIENT
Start: 2022-02-20 | End: 2022-02-20

## 2022-02-20 RX ADMIN — CARBAMIDE PEROXIDE 1 DROP(S): 81.86 SOLUTION/ DROPS AURICULAR (OTIC) at 17:45

## 2022-02-20 RX ADMIN — Medication 25 MILLIGRAM(S): at 17:15

## 2022-02-20 RX ADMIN — Medication 25 MILLIGRAM(S): at 05:34

## 2022-02-20 RX ADMIN — Medication 650 MILLIGRAM(S): at 08:07

## 2022-02-20 RX ADMIN — POLYETHYLENE GLYCOL 3350 17 GRAM(S): 17 POWDER, FOR SOLUTION ORAL at 13:01

## 2022-02-20 RX ADMIN — Medication 650 MILLIGRAM(S): at 08:05

## 2022-02-20 RX ADMIN — SENNA PLUS 2 TABLET(S): 8.6 TABLET ORAL at 21:23

## 2022-02-20 RX ADMIN — PANTOPRAZOLE SODIUM 40 MILLIGRAM(S): 20 TABLET, DELAYED RELEASE ORAL at 17:14

## 2022-02-20 RX ADMIN — PANTOPRAZOLE SODIUM 40 MILLIGRAM(S): 20 TABLET, DELAYED RELEASE ORAL at 05:34

## 2022-02-20 NOTE — PROGRESS NOTE ADULT - ASSESSMENT
76 y F with PMH of severe aortic stenosis, transfusion-dependent anemia (sees Dr. Gaston, thought to be 2/2 CKD + obscure GI bleeding), HTN, morbid obesity, CKD presents with chest pain and SOB while getting a blood transfusion.    #Chronic DARRIN, pancytopenia  #Melena and hematochezia  - Patient receives Retacrit 2000 U weekly and blood transfusions every 1-2 weeks, most recently today  - EGD and colonoscopy in 2020 showed gastritis, diverticulosis, and hemorrhoids   - Hgb 6.5 on admission, s/p 1u pRBC-->7.4-->6.9-->8.9-->7.7->s/p 1u pRBC-->10.1-->9.4; pending 2/20  - GI consult       s/p capsule- positive for bleeding in the stomach and AVM in duodenum   - high risk for anesthesia so GI would like to monitor CBC over the weekend to determine risk vs benefit of EGD intervention  - resumed DASH diet for now  - will maintain active type and screen and large bore IV access  - monitor serial CBCs and transfuse for hgb <7    # EVAN on CKD  - baseline ~1.9-2.2  - currently 3.6-->3.9; pending 2/20  - unsure etiology  - Renal US unremarkable as above  - urine lytes ordered  - Neph c/s  - have to be judicious with IVF as patient is getting unit of blood and has severe AS    # Acute hypoxic respiratory failure  - CXR on admission b/l opacities  - likely 2/2 to TACO in setting of recent blood transfusion  - s/p lasix 40mg IV x1 and repeat 40mg IV x1  - serial CXR  - weaned to RA    #Chest pain  - likely 2/2 symptomatic anemia  - Troponin negative x1; currently pain-free  - EKG NSR, RBBB, no ST changes  - Trend troponin    #Chronic diastolic heart failure  #Severe AS  #Shortness of breath  - TTE 9/2021: EF 65%, grade II DD, severe AS, mod MR, mild TR  - Volume overloaded on exam  - bibasilar opacities on CXR- improved on serial imaging  - s/p Lasix IV 40 mg with pRBC transfusion an additional 20mg IV x1; improved    #RLE swelling; r/o DVT  - VA Duplex Lower Ext Vein Scan, Bilat (02.15.22 @ 21:40)-No evidence of deep venous thrombosis or superficial thrombophlebitis in   the bilateral lower extremities. Peroneal vein is not visualized right side    #Periumbilical abdominal pain  - Last BM 4 days ago  - Abdominal US  - Miralax, senna    #HTN  - Resume home metoprolol    DVT Prophylaxis: SCDs  GI Prophylaxis: Protonix IV  Diet: Clear liquids  Activity: AAT    #Progress Note Handoff  Pending (specify):  monitor CBC over weekend; GI f/up for possible EGD on Tuesday. PT eval  Family discussion: patient updated and in agreement of plan  Disposition: unknown at this time.

## 2022-02-20 NOTE — PROGRESS NOTE ADULT - SUBJECTIVE AND OBJECTIVE BOX
LATRICIA ARREAGA  76y  Female      Patient is a 76y old  Female who presents with a chief complaint of Chest pain, anemia, melena (19 Feb 2022 14:39)      INTERVAL HPI/OVERNIGHT EVENTS: no acute events overnight. no reports of melena or hematochezia. unable to get labs via phlebotomy. labs drawn via midline and stat orders placed.       REVIEW OF SYSTEMS:  CONSTITUTIONAL: No fever, weight loss, or fatigue  RESPIRATORY: No cough, wheezing, chills or hemoptysis; No shortness of breath  CARDIOVASCULAR: No chest pain, palpitations, dizziness, or leg swelling  GENITOURINARY: No dysuria, frequency, hematuria, or incontinence  NEUROLOGICAL: No headaches, memory loss, loss of strength, numbness, or tremors  SKIN: No itching, burning, rashes, or lesions   MUSCULOSKELETAL: No joint pain or swelling; No muscle, back, or extremity pain  PSYCHIATRIC: No depression, anxiety, mood swings, or difficulty sleeping  All other review of systems negative    VITALS  T(C): 35.6 (02-19-22 @ 18:49), Max: 35.6 (02-19-22 @ 13:03)  HR: 97 (02-19-22 @ 18:49) (66 - 97)  BP: 135/62 (02-19-22 @ 18:49) (135/62 - 153/65)  RR: 17 (02-19-22 @ 18:49) (17 - 18)  SpO2: 94% (02-19-22 @ 18:49) (93% - 94%)  Wt(kg): --Vital Signs Last 24 Hrs  T(C): 35.6 (19 Feb 2022 18:49), Max: 35.6 (19 Feb 2022 13:03)  T(F): 96.1 (19 Feb 2022 18:49), Max: 96.1 (19 Feb 2022 13:03)  HR: 97 (19 Feb 2022 18:49) (66 - 97)  BP: 135/62 (19 Feb 2022 18:49) (135/62 - 153/65)  BP(mean): --  RR: 17 (19 Feb 2022 18:49) (17 - 18)  SpO2: 94% (19 Feb 2022 18:49) (93% - 94%)      02-19-22 @ 07:01  -  02-20-22 @ 07:00  --------------------------------------------------------  IN: 0 mL / OUT: 1400 mL / NET: -1400 mL    02-20-22 @ 07:01  -  02-20-22 @ 09:04  --------------------------------------------------------  IN: 240 mL / OUT: 0 mL / NET: 240 mL        PHYSICAL EXAM:  GENERAL: elderly F, NAD, non toxic appearing  EYES: anicteric sclera, non injected conjunctiva, EOMI  ENT: poor dentition, dry MM  PSYCH: no agitation, baseline mentation  NERVOUS SYSTEM:  Alert & Oriented X3, CN 2-12 grossly intact  PULMONARY: Clear to percussion bilaterally; No rales, rhonchi, wheezing, or rubs  CARDIOVASCULAR: Regular rate and rhythm; No murmurs, rubs, or gallops  GI: Soft, Nontender, Nondistended; Bowel sounds present  EXTREMITIES:  2+ Peripheral Pulses, No clubbing, cyanosis, or edema  SKIN: No rashes or lesions    Consultant(s) Notes Reviewed:  [x ] YES  [ ] NO    Discussed with Consultants/Other Providers [ x] YES     LABS                          9.4    4.93  )-----------( 112      ( 19 Feb 2022 04:30 )             30.0     02-19    144  |  108  |  70<HH>  ----------------------------<  88  5.0   |  24  |  3.9<H>    Ca    8.3<L>      19 Feb 2022 04:30  Mg     2.2     02-19    TPro  5.9<L>  /  Alb  3.3<L>  /  TBili  1.5<H>  /  DBili  x   /  AST  11  /  ALT  6   /  AlkPhos  76  02-19        RADIOLOGY & ADDITIONAL TESTS:  - no images 2/20    Imaging Personally Reviewed:  [ ] YES  [ ] NO    HEALTH ISSUES - PROBLEM Dx:  Suspected deep vein thrombosis (DVT)      MEDICATIONS  (STANDING):  metoprolol tartrate 25 milliGRAM(s) Oral two times a day  pantoprazole  Injectable 40 milliGRAM(s) IV Push two times a day  polyethylene glycol 3350 17 Gram(s) Oral daily  senna 2 Tablet(s) Oral at bedtime    MEDICATIONS  (PRN):  acetaminophen     Tablet .. 650 milliGRAM(s) Oral every 6 hours PRN Temp greater or equal to 38C (100.4F), Mild Pain (1 - 3)  aluminum hydroxide/magnesium hydroxide/simethicone Suspension 30 milliLiter(s) Oral every 4 hours PRN Dyspepsia  melatonin 3 milliGRAM(s) Oral at bedtime PRN Insomnia

## 2022-02-20 NOTE — CONSULT NOTE ADULT - ASSESSMENT
76 y F with PMH of severe aortic stenosis, transfusion-dependent anemia (sees Dr. Gaston, thought to be 2/2 CKD + obscure GI bleeding), HTN, morbid obesity, CKD presents with chest pain and SOB while getting a blood transfusion.    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

## 2022-02-20 NOTE — CONSULT NOTE ADULT - SUBJECTIVE AND OBJECTIVE BOX
NEPHROLOGY CONSULTATION NOTE    LATRICIA ARREAGA  76y  Female  MRN-[id number]    CC:   Patient is a 76y old  Female who presents with a chief complaint of Chest pain, anemia, melena (20 Feb 2022 09:04)      HPI:  76 y F with PMH of severe aortic stenosis, transfusion-dependent anemia (sees Dr. Gaston, thought to be 2/2 CKD + obscure GI bleeding), HTN, morbid obesity, CKD presents with chest pain and SOB while getting a blood transfusion today; also c/o blood in stool, black stools, R leg swelling, abdominal pain. Chest pain is substernal and left sided, tightness, not related to exertion (but she has not exerted herself), started after receiving 1 unit pRBCs today, associated with some mild SOB. She reports Right leg pain and swelling but unsure for how long. Also c/o periumbilical pain, last BM 4 days ago. She denies palpitations, fever, cough, n/v, hematemesis, dysuria.    ED VS: T(F): , Max: 97.6  HR:  (71 - 72)  BP:  (129/63 - 146/66)  RR: 18  SpO2:  (97% - 99%)    Labs: Hgb 6.5, trop neg x1, proBNP 1191  Imaging: RLE duplex pending  OLEKSANDR positive in ED. 1 unit pRBC ordered    Pt is admitted for chest pain, symptomatic anemia   (16 Feb 2022 00:47)      PAST MEDICAL & SURGICAL HISTORY:  HTN (hypertension)    Heart murmur    Eczema    Anemia    Aortic stenosis    H/O appendicitis    H/O cholecystitis      Allergies:  aspirin (Rash)  latex (Unknown)  penicillins (Unknown)    Home Medications Reviewed  Hospital Medications:   MEDICATIONS  (STANDING):  metoprolol tartrate 25 milliGRAM(s) Oral two times a day  pantoprazole  Injectable 40 milliGRAM(s) IV Push two times a day  polyethylene glycol 3350 17 Gram(s) Oral daily  senna 2 Tablet(s) Oral at bedtime    MEDICATIONS  (PRN):  acetaminophen     Tablet .. 650 milliGRAM(s) Oral every 6 hours PRN Temp greater or equal to 38C (100.4F), Mild Pain (1 - 3)  aluminum hydroxide/magnesium hydroxide/simethicone Suspension 30 milliLiter(s) Oral every 4 hours PRN Dyspepsia  melatonin 3 milliGRAM(s) Oral at bedtime PRN Insomnia    Home medications:  aluminum hydroxide-magnesium hydroxide 200 mg-200 mg/5 mL oral suspension: 30 milliliter(s) orally every 4 hours, As needed, Dyspepsia (24 Jan 2022 12:15)  heparin: 5000 unit(s) subcutaneous 3 times a day (24 Jan 2022 12:15)  melatonin 3 mg oral tablet: 1 tab(s) orally once a day (at bedtime), As needed, Insomnia (24 Jan 2022 12:15)  Metoprolol Tartrate 25 mg oral tablet: 1 tab(s) orally 2 times a day (18 Jan 2022 00:11)  pantoprazole 40 mg oral delayed release tablet: 1 tab(s) orally once a day (before a meal) (24 Jan 2022 12:15)      SOCIAL HISTORY:  Social History:  Substance Use (street drugs): ( x ) never used  (  ) other:  Tobacco Usage:  (   ) never smoked   (  x ) former smoker   (   ) current smoker  (     ) pack year  Alcohol Usage: None (16 Feb 2022 00:47)      FAMILY HISTORY:  FH: kidney disease (Father)        REVIEW OF SYSTEMS:   All other review of systems is negative unless indicated above.    VITALS:  T(F): 97.2 (02-20-22 @ 20:05), Max: 97.2 (02-20-22 @ 20:05)  HR: 68 (02-20-22 @ 20:05)  BP: 140/67 (02-20-22 @ 20:05)  RR: 18 (02-20-22 @ 20:05)  SpO2: 95% (02-20-22 @ 17:10)      I&O's Detail    19 Feb 2022 07:01  -  20 Feb 2022 07:00  --------------------------------------------------------  IN:  Total IN: 0 mL    OUT:    Voided (mL): 1400 mL  Total OUT: 1400 mL    Total NET: -1400 mL      20 Feb 2022 07:01  -  20 Feb 2022 22:42  --------------------------------------------------------  IN:    Oral Fluid: 779 mL  Total IN: 779 mL    OUT:    Indwelling Catheter - Urethral (mL): 880 mL    Stool (mL): 1 mL  Total OUT: 881 mL    Total NET: -102 mL      I&O's Summary    19 Feb 2022 07:01  -  20 Feb 2022 07:00  --------------------------------------------------------  IN: 0 mL / OUT: 1400 mL / NET: -1400 mL    20 Feb 2022 07:01  -  20 Feb 2022 22:42  --------------------------------------------------------  IN: 779 mL / OUT: 881 mL / NET: -102 mL        PHYSICAL EXAM:  Gen: NAD  resp: decreased bs at the bases  card: S1/S2  abd: soft  ext: +edema    LABS:      02-20    142  |  110  |  62<HH>  ----------------------------<  118<H>  4.8   |  24  |  3.4<H>    Ca    8.1<L>      20 Feb 2022 10:50  Mg     2.2     02-19    TPro  5.6<L>  /  Alb  3.3<L>  /  TBili  1.3<H>  /  DBili      /  AST  12  /  ALT  6   /  AlkPhos  72  02-20      Creatinine Trend:   Creatinine, Serum: 3.4 mg/dL (02-20-22 @ 10:50)  Creatinine, Serum: 3.9 mg/dL (02-19-22 @ 04:30)  Creatinine, Serum: 3.6 mg/dL (02-18-22 @ 06:30)  Creatinine, Serum: 2.9 mg/dL (02-17-22 @ 16:00)  Creatinine, Serum: 2.6 mg/dL (02-17-22 @ 04:30)  Creatinine, Serum: 2.3 mg/dL (02-16-22 @ 05:43)  Creatinine, Serum: 2.1 mg/dL (02-15-22 @ 19:09)  Creatinine, Serum: 2.1 mg/dL (01-31-22 @ 23:50)                            9.4    4.80  )-----------( 113      ( 20 Feb 2022 10:50 )             30.5     Mean Cell Volume: 95.6 fL (02-20-22 @ 10:50)    Hemoglobin: 9.4 g/dL (02.19.22 @ 04:30)  Hemoglobin: 7.7 g/dL (02.18.22 @ 06:30)        Urine Studies:  Urinalysis (02.01.22 @ 11:51)    pH Urine: 5.5    Glucose Qualitative, Urine: Negative    Blood, Urine: Negative    Color: Light Yellow    Urine Appearance: Clear    Bilirubin: Negative    Ketone - Urine: Negative    Specific Gravity: 1.020    Protein, Urine: 30 mg/dL    Urobilinogen: <2 mg/dL    Nitrite: Negative    Leukocyte Esterase Concentration: Negative    Urine Microscopic-Add On (NC) (02.01.22 @ 11:51)    Bacteria: Negative    Epithelial Cells: 1 /HPF    Red Blood Cell - Urine: 1 /HPF    White Blood Cell - Urine: 1 /HPF    Hyaline Casts: 0 /LPF      Creatinine, Random Urine: 50 mg/dL (02-19 @ 18:30)  Sodium, Random Urine: 83.0 mmoL/L (02-19 @ 18:30)            RADIOLOGY & ADDITIONAL STUDIES:    US Kidney and Bladder:   ACC: [id number] EXAM:  US KIDNEYS AND BLADDER                          PROCEDURE DATE:  02/19/2022          INTERPRETATION:  CLINICAL INFORMATION: Evaluate for hydronephrosis    COMPARISON: CT abdomen and pelvis 1/17/2022    TECHNIQUE: Sonography of the kidneys and bladder.    FINDINGS:    Right kidney: 10.1 cm in length. No hydronephrosis or calculi.    Left kidney: Not visualized.    Urinary bladder: Decompressed urinary bladder containing a urinary   catheter balloon.      IMPRESSION:      No right hydronephrosis.    Left kidney is not visualized.    Limited evaluation of decompressed urinary bladder.    --- End of Report ---          KELLI MENCHACA MD; Resident Radiologist  This document has been electronically signed.  BRO ALVAREZ MD; Attending Radiologist  This document has been electronically signed. Feb 19 2022  1:07AM (02-19-22 @ 00:34)      Xray Chest 1 View- PORTABLE-Urgent:   ACC: [id number] EXAM:  XR CHEST PORTABLE URGENT 1V                          PROCEDURE DATE:  02/18/2022          INTERPRETATION:  Clinical History / Reason for exam: Shortness of breath    Comparison : Chest radiograph 2/15/2022.    Technique/Positioning: Frontal chest radiograph.    Findings:    Support devices: None.    Cardiac/mediastinum/hilum: Unchanged    Lung parenchyma/Pleura: Bilateral opacities without significant change.   No pneumothorax    Skeleton/soft tissues: Unchanged    Impression:    Bilateral opacities without significant change        --- End of Report ---            GUTIERREZ GRIGGS MD; Attending Radiologist  This document has been electronically signed. Feb 18 2022  1:45PM (02-18-22 @ 12:41)            < from: TTE Echo Complete w/o Contrast w/ Doppler (02.17.22 @ 12:23) >  Summary:   1. Normal global left ventricular systolic function.   2. LV Ejection Fraction by Brown's Method with a biplane EF of 74 %.   3. Normal left ventricular internal cavity size.   4. Mild mitral valve regurgitation.   5. Severe aortic valve stenosis.   6. Estimated pulmonary artery systolic pressure is 44.5 mmHg assuming a   right atrial pressure of 15 mmHg, which is consistent with mild pulmonary   hypertension.   7. Pulmonary hypertension is present.   8. Mitral valve mean gradient is 4.6 mmHg consistent with mild mitral   stenosis.   9. Peak transaortic gradient equals 86.1 mmHg, mean transaortic gradient   equals 48.9 mmHg, the calculated aortic valve area equals 0.80 cm² by the   continuity equation consistent with severe aortic stenosis.    < end of copied text >

## 2022-02-21 LAB
ALBUMIN SERPL ELPH-MCNC: 3.3 G/DL — LOW (ref 3.5–5.2)
ALP SERPL-CCNC: 71 U/L — SIGNIFICANT CHANGE UP (ref 30–115)
ALT FLD-CCNC: 6 U/L — SIGNIFICANT CHANGE UP (ref 0–41)
ANION GAP SERPL CALC-SCNC: 10 MMOL/L — SIGNIFICANT CHANGE UP (ref 7–14)
ANION GAP SERPL CALC-SCNC: 11 MMOL/L — SIGNIFICANT CHANGE UP (ref 7–14)
AST SERPL-CCNC: 13 U/L — SIGNIFICANT CHANGE UP (ref 0–41)
BASOPHILS # BLD AUTO: 0.01 K/UL — SIGNIFICANT CHANGE UP (ref 0–0.2)
BASOPHILS NFR BLD AUTO: 0.2 % — SIGNIFICANT CHANGE UP (ref 0–1)
BILIRUB SERPL-MCNC: 1.2 MG/DL — SIGNIFICANT CHANGE UP (ref 0.2–1.2)
BUN SERPL-MCNC: 55 MG/DL — HIGH (ref 10–20)
BUN SERPL-MCNC: 62 MG/DL — CRITICAL HIGH (ref 10–20)
CALCIUM SERPL-MCNC: 8.2 MG/DL — LOW (ref 8.5–10.1)
CALCIUM SERPL-MCNC: 8.4 MG/DL — LOW (ref 8.5–10.1)
CHLORIDE SERPL-SCNC: 109 MMOL/L — SIGNIFICANT CHANGE UP (ref 98–110)
CHLORIDE SERPL-SCNC: 112 MMOL/L — HIGH (ref 98–110)
CO2 SERPL-SCNC: 23 MMOL/L — SIGNIFICANT CHANGE UP (ref 17–32)
CO2 SERPL-SCNC: 25 MMOL/L — SIGNIFICANT CHANGE UP (ref 17–32)
CREAT SERPL-MCNC: 2.8 MG/DL — HIGH (ref 0.7–1.5)
CREAT SERPL-MCNC: 3.1 MG/DL — HIGH (ref 0.7–1.5)
EOSINOPHIL # BLD AUTO: 0.33 K/UL — SIGNIFICANT CHANGE UP (ref 0–0.7)
EOSINOPHIL NFR BLD AUTO: 5.7 % — SIGNIFICANT CHANGE UP (ref 0–8)
GLUCOSE SERPL-MCNC: 94 MG/DL — SIGNIFICANT CHANGE UP (ref 70–99)
GLUCOSE SERPL-MCNC: 99 MG/DL — SIGNIFICANT CHANGE UP (ref 70–99)
HCT VFR BLD CALC: 31 % — LOW (ref 37–47)
HGB BLD-MCNC: 9.5 G/DL — LOW (ref 12–16)
IMM GRANULOCYTES NFR BLD AUTO: 0.3 % — SIGNIFICANT CHANGE UP (ref 0.1–0.3)
LYMPHOCYTES # BLD AUTO: 0.48 K/UL — LOW (ref 1.2–3.4)
LYMPHOCYTES # BLD AUTO: 8.4 % — LOW (ref 20.5–51.1)
MAGNESIUM SERPL-MCNC: 2.2 MG/DL — SIGNIFICANT CHANGE UP (ref 1.8–2.4)
MCHC RBC-ENTMCNC: 29.4 PG — SIGNIFICANT CHANGE UP (ref 27–31)
MCHC RBC-ENTMCNC: 30.6 G/DL — LOW (ref 32–37)
MCV RBC AUTO: 96 FL — SIGNIFICANT CHANGE UP (ref 81–99)
MONOCYTES # BLD AUTO: 0.6 K/UL — SIGNIFICANT CHANGE UP (ref 0.1–0.6)
MONOCYTES NFR BLD AUTO: 10.5 % — HIGH (ref 1.7–9.3)
NEUTROPHILS # BLD AUTO: 4.3 K/UL — SIGNIFICANT CHANGE UP (ref 1.4–6.5)
NEUTROPHILS NFR BLD AUTO: 74.9 % — SIGNIFICANT CHANGE UP (ref 42.2–75.2)
NRBC # BLD: 0 /100 WBCS — SIGNIFICANT CHANGE UP (ref 0–0)
PLATELET # BLD AUTO: 114 K/UL — LOW (ref 130–400)
POTASSIUM SERPL-MCNC: 5 MMOL/L — SIGNIFICANT CHANGE UP (ref 3.5–5)
POTASSIUM SERPL-MCNC: 5.1 MMOL/L — HIGH (ref 3.5–5)
POTASSIUM SERPL-SCNC: 5 MMOL/L — SIGNIFICANT CHANGE UP (ref 3.5–5)
POTASSIUM SERPL-SCNC: 5.1 MMOL/L — HIGH (ref 3.5–5)
PROT SERPL-MCNC: 5.8 G/DL — LOW (ref 6–8)
RBC # BLD: 3.23 M/UL — LOW (ref 4.2–5.4)
RBC # FLD: 17.6 % — HIGH (ref 11.5–14.5)
SODIUM SERPL-SCNC: 143 MMOL/L — SIGNIFICANT CHANGE UP (ref 135–146)
SODIUM SERPL-SCNC: 147 MMOL/L — HIGH (ref 135–146)
WBC # BLD: 5.74 K/UL — SIGNIFICANT CHANGE UP (ref 4.8–10.8)
WBC # FLD AUTO: 5.74 K/UL — SIGNIFICANT CHANGE UP (ref 4.8–10.8)

## 2022-02-21 PROCEDURE — 99233 SBSQ HOSP IP/OBS HIGH 50: CPT

## 2022-02-21 RX ADMIN — PANTOPRAZOLE SODIUM 40 MILLIGRAM(S): 20 TABLET, DELAYED RELEASE ORAL at 05:43

## 2022-02-21 RX ADMIN — POLYETHYLENE GLYCOL 3350 17 GRAM(S): 17 POWDER, FOR SOLUTION ORAL at 11:14

## 2022-02-21 RX ADMIN — PANTOPRAZOLE SODIUM 40 MILLIGRAM(S): 20 TABLET, DELAYED RELEASE ORAL at 17:04

## 2022-02-21 RX ADMIN — Medication 30 MILLILITER(S): at 04:14

## 2022-02-21 RX ADMIN — SENNA PLUS 2 TABLET(S): 8.6 TABLET ORAL at 21:18

## 2022-02-21 RX ADMIN — Medication 25 MILLIGRAM(S): at 05:42

## 2022-02-21 RX ADMIN — Medication 25 MILLIGRAM(S): at 17:04

## 2022-02-21 NOTE — PROGRESS NOTE ADULT - ASSESSMENT
76 y F with PMH of severe aortic stenosis, transfusion-dependent iron deficiency anemia (sees Dr. 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 Paula  -US 4 mm CBD  -Capsule endoscopy positive for bleeding in the stomach and AVM in duodenum     Recs:   -Trend CBC, Keep Hb>8  -advance diet as tolerated   -PPI 40 IV bid   -As per cardiology patient is high risk for procedure given severe AS; pt is currently being diuresed as well  -Explained to the patient and her son Jace knowles ([phone])over the phone about risks and benefits of the procedure on friday they understood risks and benefits decided to hold off on EGD  -If there is any evidence of overt GI bleed with hemodynamic instability please obtain CTA   76 y F with PMH of severe aortic stenosis, transfusion-dependent iron deficiency anemia (sees Dr. 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 Paula  -US 4 mm CBD  -Capsule endoscopy positive for bleeding in the stomach and AVM in duodenum     Recs:   -Trend CBC, Keep Hb>8  -advance diet as tolerated   -PPI 40 IV bid   -As per cardiology patient is high risk for procedure given severe AS; pt is currently being diuresed as well  -Explained to the patient and her son Jace knowles ([phone])over the phone about risks and benefits of the procedure on friday they understood risks and benefits decided to hold off on EGD  -If there is any evidence of overt GI bleed with hemodynamic instability please obtain CTA    incomplete note    76 y F with PMH of severe aortic stenosis, transfusion-dependent iron deficiency anemia (sees Dr. 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 Paula  -US 4 mm CBD  -Capsule endoscopy positive for bleeding in the stomach and AVM in duodenum     Recs:   -Trend CBC, Keep Hb>8  -advance diet as tolerated   -PPI 40 PO bid   -As per cardiology patient is high risk for procedure given severe AS; pt is currently being diuresed as well  -Explained to the patient and her son Jace knowles ([phone])over the phone about risks and benefits of the procedure on friday they understood risks and benefits decided to hold off on EGD  -Follow up with Dr. Robles as an OP     recall as needed

## 2022-02-21 NOTE — PROGRESS NOTE ADULT - SUBJECTIVE AND OBJECTIVE BOX
Gastroenterology progress note:     Patient is a 76y old  Female who presents with a chief complaint of Chest pain, anemia, melena (21 Feb 2022 09:08)       Admitted on: 02-16-22    We are following the patient for Anemia      Interval History:  No BM last night   One BM small on saturday   Denies any nausea, vomiting, abdominal pain.        PAST MEDICAL & SURGICAL HISTORY:  HTN (hypertension)    Heart murmur    Eczema    Anemia    Aortic stenosis    H/O appendicitis    H/O cholecystitis        MEDICATIONS  (STANDING):  metoprolol tartrate 25 milliGRAM(s) Oral two times a day  pantoprazole  Injectable 40 milliGRAM(s) IV Push two times a day  polyethylene glycol 3350 17 Gram(s) Oral daily  senna 2 Tablet(s) Oral at bedtime    MEDICATIONS  (PRN):  acetaminophen     Tablet .. 650 milliGRAM(s) Oral every 6 hours PRN Temp greater or equal to 38C (100.4F), Mild Pain (1 - 3)  aluminum hydroxide/magnesium hydroxide/simethicone Suspension 30 milliLiter(s) Oral every 4 hours PRN Dyspepsia  melatonin 3 milliGRAM(s) Oral at bedtime PRN Insomnia      Allergies  aspirin (Rash)  latex (Unknown)  penicillins (Unknown)      Review of Systems:   Cardiovascular:  No Chest Pain, No Palpitations  Respiratory:  No Cough, No Dyspnea  Gastrointestinal:  As described in HPI    Physical Examination:  T(C): 37.1 (02-21-22 @ 05:21), Max: 37.1 (02-21-22 @ 05:21)  HR: 77 (02-21-22 @ 05:21) (67 - 78)  BP: 132/61 (02-21-22 @ 05:21) (120/54 - 165/67)  RR: 17 (02-21-22 @ 05:21) (17 - 20)  SpO2: 96% (02-21-22 @ 05:21) (95% - 96%)      02-20-22 @ 07:01  -  02-21-22 @ 07:00  --------------------------------------------------------  IN: 779 mL / OUT: 881 mL / NET: -102 mL      Constitutional: No acute distress.  Respiratory:  No signs of respiratory distress. Lung sounds are clear bilaterally.  Cardiovascular:  S1 S2, Regular rate and rhythm.  Abdominal: Abdomen is soft, symmetric, and non-tender without distention.   Skin: No rashes, No Jaundice.        Data:                        9.5    5.74  )-----------( 114      ( 21 Feb 2022 05:19 )             31.0     Hgb trend:  9.5  02-21-22 @ 05:19  9.4  02-20-22 @ 10:50  9.4  02-19-22 @ 04:30  10.0  02-18-22 @ 20:00        02-21    147<H>  |  112<H>  |  62<HH>  ----------------------------<  99  5.1<H>   |  25  |  3.1<H>    Ca    8.4<L>      21 Feb 2022 05:19  Mg     2.2     02-21    TPro  5.8<L>  /  Alb  3.3<L>  /  TBili  1.2  /  DBili  x   /  AST  13  /  ALT  6   /  AlkPhos  71  02-21    Liver panel trend:  TBili 1.2   /   AST 13   /   ALT 6   /   AlkP 71   /   Tptn 5.8   /   Alb 3.3    /   DBili --      02-21  TBili 1.3   /   AST 12   /   ALT 6   /   AlkP 72   /   Tptn 5.6   /   Alb 3.3    /   DBili --      02-20  TBili 1.5   /   AST 11   /   ALT 6   /   AlkP 76   /   Tptn 5.9   /   Alb 3.3    /   DBili --      02-19  TBili 1.4   /   AST 17   /   ALT <5   /   AlkP 81   /   Tptn 5.9   /   Alb 3.6    /   DBili --      02-18  TBili 1.1   /   AST 13   /   ALT 6   /   AlkP 73   /   Tptn 5.8   /   Alb 3.2    /   DBili --      02-18  TBili 1.1   /   AST 14   /   ALT 6   /   AlkP 75   /   Tptn 5.9   /   Alb 3.4    /   DBili --      02-17  TBili 1.3   /   AST 22   /   ALT 6   /   AlkP 82   /   Tptn 6.2   /   Alb 3.6    /   DBili --      02-17  TBili 1.1   /   AST 15   /   ALT 7   /   AlkP 91   /   Tptn 6.8   /   Alb 3.7    /   DBili --      02-16  TBili 1.1   /   AST 24   /   ALT 7   /   AlkP 81   /   Tptn 5.9   /   Alb 3.3    /   DBili --      02-15             Radiology:

## 2022-02-21 NOTE — PROGRESS NOTE ADULT - ATTENDING COMMENTS
Hgb stable, no melena or BRBPR. hold off on endoscopic procedures for now, per cardiology pt is high risk. cont PPI. discussed with pt and pt's HCP. they would like to hold off on EGD for now given high risk and stable blood count. can consider CTA if pt rebleeds.
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 will perform capsule endoscopy to assess if pt has any e/o bleeding AVMs and whether it is accessible with single balloon (extent reached was marked with vera ink in the past). pt is currently undergoing cardiac w/u - can plan for further endoscopic procedures based on cardiac eval
pt capsule showing oozing blood in the stomach; spoke to cardiology, pt is high risk for procedure given severe AS. in addition, pt is currently being diuresed. currently Hgb stable. spoke to pt's son and patient - will hold off on procedure for now and see if pt conts to bleed. will cont PPI for now, if ulcer may resolve on its own. monitor Hgb and bowel movements daily. if large bleed can consider CTA and embolization which would negate need for anesthesia
***My note supersedes any discrepancies that may be above in the resident's note***    76 y F with PMH of severe aortic stenosis, transfusion-dependent anemia (sees Dr. Gaston, thought to be 2/2 CKD + obscure GI bleeding), HTN, morbid obesity, CKD presents with chest pain and SOB while getting a blood transfusion.    #Chronic DARRIN, pancytopenia  #Melena and hematochezia  - Patient receives Retacrit 2000 U weekly and blood transfusions every 1-2 weeks, most recently today  - EGD and colonoscopy in 2020 showed gastritis, diverticulosis, and hemorrhoids   - Hgb 6.5 on admission, s/p 1u pRBC-->7.4-->6.9-->8.9-->7.7  - GI consult       s/p capsule yesterday-Capsule was positive for bleeding in the stomach and AVM in duodenum        Keep NPO       PPI drip        give one unit PRBC       will plan for EGD today after transfusion.      # EVAN on CKD  - baseline ~1.9-2.2  - currently 3.6  - likely pre-renal in setting of decreased PO intake  - have to be judicious with IVF as patient is getting unit of blood and has severe AS      #Chest pain  - likely 2/2 symptomatic anemia  - Troponin negative x1; currently pain-free  - EKG NSR, RBBB, no ST changes  - Trend troponin    #Chronic diastolic heart failure  #Severe AS  #Shortness of breath  - TTE 9/2021: EF 65%, grade II DD, severe AS, mod MR, mild TR  - Volume overloaded on exam  - bibasilar opacities on CXR- improved on serial imaging  - s/p Lasix IV 40 mg with pRBC transfusion an additional 20mg IV x1; improved    #RLE swelling; r/o DVT  - VA Duplex Lower Ext Vein Scan, Bilat (02.15.22 @ 21:40)-No evidence of deep venous thrombosis or superficial thrombophlebitis in   the bilateral lower extremities. Peroneal vein is not visualized right side    #Periumbilical abdominal pain  - Last BM 4 days ago  - Abdominal US  - Miralax, senna    #HTN  - Resume home metoprolol    DVT Prophylaxis: SCDs  GI Prophylaxis: Protonix IV  Diet: Clear liquids  Activity: AAT    #Progress Note Handoff  Pending (specify):  EGD today after 1u of pRBC  Family discussion: patient updated and in agreement of plan  Disposition: unknown at this time.
No overt bleeding at this time

## 2022-02-21 NOTE — PROGRESS NOTE ADULT - ASSESSMENT
76 y F with PMH of severe aortic stenosis, transfusion-dependent anemia (sees Dr. Gaston, thought to be 2/2 CKD + obscure GI bleeding), HTN, morbid obesity, CKD presents with chest pain and SOB while getting a blood transfusion.    #Chronic DARRIN, pancytopenia  #Melena and hematochezia  - Patient receives Retacrit 2000 U weekly and blood transfusions every 1-2 weeks, most recently today  - EGD and colonoscopy in 2020 showed gastritis, diverticulosis, and hemorrhoids   - Hgb 6.5 on admission, s/p 1u pRBC-->7.4-->6.9-->8.9-->7.7->s/p 1u pRBC-->10.1-->9.4; stable   - GI consult       s/p capsule- positive for bleeding in the stomach and AVM in duodenum   - high risk for anesthesia so GI would like to monitor CBC over the weekend to determine risk vs benefit of EGD intervention  - resumed DASH diet for now  - will maintain active type and screen and large bore IV access  - monitor serial CBCs and transfuse for hgb <7    # EVAN on CKD  - baseline ~1.9-2.2  - currently 3.6-->3.9-->3.1  - unsure etiology  - Renal US unremarkable   - urine lytes ordered  - Neph c/s  - have to be judicious with IVF as patient is getting unit of blood and has severe AS    # Acute hypoxic respiratory failure  - CXR on admission b/l opacities  - likely 2/2 to TACO in setting of recent blood transfusion  - s/p lasix 40mg IV x1 and repeat 40mg IV x1  - serial CXR  - weaned to RA    #Chest pain  - likely 2/2 symptomatic anemia  - Troponin negative x1; currently pain-free  - EKG NSR, RBBB, no ST changes  - Trend troponin    #Chronic diastolic heart failure  #Severe AS  #Shortness of breath  - TTE 9/2021: EF 65%, grade II DD, severe AS, mod MR, mild TR  - Volume overloaded on exam  - bibasilar opacities on CXR- improved on serial imaging  - s/p Lasix IV 40 mg with pRBC transfusion an additional 20mg IV x1; improved    #RLE swelling; r/o DVT  - VA Duplex Lower Ext Vein Scan, Bilat (02.15.22 @ 21:40)-No evidence of deep venous thrombosis or superficial thrombophlebitis in   the bilateral lower extremities. Peroneal vein is not visualized right side    #Periumbilical abdominal pain  - Last BM 4 days ago  - Abdominal US  - Miralax, senna    #HTN  - Resume home metoprolol    DVT Prophylaxis: SCDs  GI Prophylaxis: Protonix IV  Diet: Clear liquids  Activity: AAT    #Progress Note Handoff  Pending (specify):  monitor CBC over weekend; GI f/up for possible EGD on Tuesday? PT eval  Family discussion: patient updated and in agreement of plan  Disposition: unknown at this time.

## 2022-02-21 NOTE — PROGRESS NOTE ADULT - SUBJECTIVE AND OBJECTIVE BOX
LATRICIA ARREAGA  76y  Female      Patient is a 76y old  Female who presents with a chief complaint of Chest pain, anemia, melena (21 Feb 2022 09:46)      INTERVAL HPI/OVERNIGHT EVENTS: no acute events overnight. no melena or hematochezia.       REVIEW OF SYSTEMS:  CONSTITUTIONAL: No fever, weight loss, or fatigue  RESPIRATORY: No cough, wheezing, chills or hemoptysis; No shortness of breath  CARDIOVASCULAR: No chest pain, palpitations, dizziness, or leg swelling  GASTROINTESTINAL: No abdominal or epigastric pain. No nausea, vomiting, or hematemesis; No diarrhea or constipation. No melena or hematochezia.  GENITOURINARY: No dysuria, frequency, hematuria, or incontinence  NEUROLOGICAL: No headaches, memory loss, loss of strength, numbness, or tremors  SKIN: No itching, burning, rashes, or lesions   MUSCULOSKELETAL: No joint pain or swelling; No muscle, back, or extremity pain  PSYCHIATRIC: No depression, anxiety, mood swings, or difficulty sleeping  All other review of systems negative    VITALS  T(C): 37.1 (02-21-22 @ 05:21), Max: 37.1 (02-21-22 @ 05:21)  HR: 77 (02-21-22 @ 05:21) (67 - 78)  BP: 132/61 (02-21-22 @ 05:21) (120/54 - 165/67)  RR: 17 (02-21-22 @ 05:21) (17 - 20)  SpO2: 96% (02-21-22 @ 05:21) (95% - 96%)  Wt(kg): --Vital Signs Last 24 Hrs  T(C): 37.1 (21 Feb 2022 05:21), Max: 37.1 (21 Feb 2022 05:21)  T(F): 98.8 (21 Feb 2022 05:21), Max: 98.8 (21 Feb 2022 05:21)  HR: 77 (21 Feb 2022 05:21) (67 - 78)  BP: 132/61 (21 Feb 2022 05:21) (120/54 - 165/67)  BP(mean): --  RR: 17 (21 Feb 2022 05:21) (17 - 20)  SpO2: 96% (21 Feb 2022 05:21) (95% - 96%)      02-20-22 @ 07:01  -  02-21-22 @ 07:00  --------------------------------------------------------  IN: 779 mL / OUT: 881 mL / NET: -102 mL        PHYSICAL EXAM:  GENERAL: elderly F, NAD, non toxic appearing  EYES: anicteric sclera, non injected conjunctiva, EOMI  ENT: poor dentition, dry MM  PSYCH: no agitation, baseline mentation  NERVOUS SYSTEM:  Alert & Oriented X3, CN 2-12 grossly intact  PULMONARY: Clear to percussion bilaterally; No rales, rhonchi, wheezing, or rubs  CARDIOVASCULAR: Regular rate and rhythm; No murmurs, rubs, or gallops  GI: Soft, Nontender, Nondistended; Bowel sounds present  EXTREMITIES:  2+ Peripheral Pulses, No clubbing, cyanosis, or edema  SKIN: No rashes or lesions    Consultant(s) Notes Reviewed:  [x ] YES  [ ] NO    Discussed with Consultants/Other Providers [ x] YES     LABS                          9.5    5.74  )-----------( 114      ( 21 Feb 2022 05:19 )             31.0     02-21    147<H>  |  112<H>  |  62<HH>  ----------------------------<  99  5.1<H>   |  25  |  3.1<H>    Ca    8.4<L>      21 Feb 2022 05:19  Mg     2.2     02-21    TPro  5.8<L>  /  Alb  3.3<L>  /  TBili  1.2  /  DBili  x   /  AST  13  /  ALT  6   /  AlkPhos  71  02-21    RADIOLOGY & ADDITIONAL TESTS:  - no images 2/21  Imaging Personally Reviewed:  [ ] YES  [ ] NO    HEALTH ISSUES - PROBLEM Dx:  Suspected deep vein thrombosis (DVT)    MEDICATIONS  (STANDING):  metoprolol tartrate 25 milliGRAM(s) Oral two times a day  pantoprazole  Injectable 40 milliGRAM(s) IV Push two times a day  polyethylene glycol 3350 17 Gram(s) Oral daily  senna 2 Tablet(s) Oral at bedtime    MEDICATIONS  (PRN):  acetaminophen     Tablet .. 650 milliGRAM(s) Oral every 6 hours PRN Temp greater or equal to 38C (100.4F), Mild Pain (1 - 3)  aluminum hydroxide/magnesium hydroxide/simethicone Suspension 30 milliLiter(s) Oral every 4 hours PRN Dyspepsia  melatonin 3 milliGRAM(s) Oral at bedtime PRN Insomnia

## 2022-02-21 NOTE — PROGRESS NOTE ADULT - ASSESSMENT
76 y F with PMH of severe aortic stenosis, transfusion-dependent anemia (sees Dr. Gaston, thought to be 2/2 CKD + obscure GI bleeding), HTN, morbid obesity, CKD presents with chest pain and SOB while getting a blood transfusion.    # EVAN on CKD 4 / baseline creat ~2 / ?pre-renal iso acute blood loss anemia / obstructive non oliguric after culver   # Acute on chronic iron defic anemia receiving wkly RBC transfusions and WOODY   # UGIB / s/p capsule endoscopy +bleeding in stomach/duodenal AVM  # Acute decompensated heart failure / HFpEF / Severe AS   # HTN    - non oliguric  - creat down-trending noted from today   - s/p RBC transfusion / iv lasix  - renal ultrasound noted, R w/o hydronephrosis, L kidney not visualized     Recommendations:  - strict i/o  - hold diuretics for now/ sp IV lasix yesterday   - BP controlled  - check phos level, 25OH vitD, iPTH  - monitor hgb, stable now s/p RBC transfusions / appreciate GI f/u   - no indication for RRT  - will follow

## 2022-02-22 LAB
ALBUMIN SERPL ELPH-MCNC: 3.1 G/DL — LOW (ref 3.5–5.2)
ALP SERPL-CCNC: 66 U/L — SIGNIFICANT CHANGE UP (ref 30–115)
ALT FLD-CCNC: 6 U/L — SIGNIFICANT CHANGE UP (ref 0–41)
ANION GAP SERPL CALC-SCNC: 10 MMOL/L — SIGNIFICANT CHANGE UP (ref 7–14)
ANION GAP SERPL CALC-SCNC: 16 MMOL/L — HIGH (ref 7–14)
AST SERPL-CCNC: 13 U/L — SIGNIFICANT CHANGE UP (ref 0–41)
BASOPHILS # BLD AUTO: 0.01 K/UL — SIGNIFICANT CHANGE UP (ref 0–0.2)
BASOPHILS NFR BLD AUTO: 0.3 % — SIGNIFICANT CHANGE UP (ref 0–1)
BILIRUB SERPL-MCNC: 1.2 MG/DL — SIGNIFICANT CHANGE UP (ref 0.2–1.2)
BLD GP AB SCN SERPL QL: SIGNIFICANT CHANGE UP
BUN SERPL-MCNC: 53 MG/DL — HIGH (ref 10–20)
BUN SERPL-MCNC: 53 MG/DL — HIGH (ref 10–20)
CALCIUM SERPL-MCNC: 8.2 MG/DL — LOW (ref 8.5–10.1)
CALCIUM SERPL-MCNC: 8.3 MG/DL — LOW (ref 8.5–10.1)
CHLORIDE SERPL-SCNC: 111 MMOL/L — HIGH (ref 98–110)
CHLORIDE SERPL-SCNC: 113 MMOL/L — HIGH (ref 98–110)
CO2 SERPL-SCNC: 18 MMOL/L — SIGNIFICANT CHANGE UP (ref 17–32)
CO2 SERPL-SCNC: 23 MMOL/L — SIGNIFICANT CHANGE UP (ref 17–32)
CREAT SERPL-MCNC: 2.9 MG/DL — HIGH (ref 0.7–1.5)
CREAT SERPL-MCNC: 3 MG/DL — HIGH (ref 0.7–1.5)
EOSINOPHIL # BLD AUTO: 0.34 K/UL — SIGNIFICANT CHANGE UP (ref 0–0.7)
EOSINOPHIL NFR BLD AUTO: 8.6 % — HIGH (ref 0–8)
GLUCOSE SERPL-MCNC: 108 MG/DL — HIGH (ref 70–99)
GLUCOSE SERPL-MCNC: 75 MG/DL — SIGNIFICANT CHANGE UP (ref 70–99)
HCT VFR BLD CALC: 30.2 % — LOW (ref 37–47)
HGB BLD-MCNC: 9 G/DL — LOW (ref 12–16)
IMM GRANULOCYTES NFR BLD AUTO: 0.5 % — HIGH (ref 0.1–0.3)
LYMPHOCYTES # BLD AUTO: 0.58 K/UL — LOW (ref 1.2–3.4)
LYMPHOCYTES # BLD AUTO: 14.6 % — LOW (ref 20.5–51.1)
MAGNESIUM SERPL-MCNC: 2.2 MG/DL — SIGNIFICANT CHANGE UP (ref 1.8–2.4)
MCHC RBC-ENTMCNC: 29.1 PG — SIGNIFICANT CHANGE UP (ref 27–31)
MCHC RBC-ENTMCNC: 29.8 G/DL — LOW (ref 32–37)
MCV RBC AUTO: 97.7 FL — SIGNIFICANT CHANGE UP (ref 81–99)
MONOCYTES # BLD AUTO: 0.58 K/UL — SIGNIFICANT CHANGE UP (ref 0.1–0.6)
MONOCYTES NFR BLD AUTO: 14.6 % — HIGH (ref 1.7–9.3)
NEUTROPHILS # BLD AUTO: 2.43 K/UL — SIGNIFICANT CHANGE UP (ref 1.4–6.5)
NEUTROPHILS NFR BLD AUTO: 61.4 % — SIGNIFICANT CHANGE UP (ref 42.2–75.2)
NRBC # BLD: 0 /100 WBCS — SIGNIFICANT CHANGE UP (ref 0–0)
PLATELET # BLD AUTO: 105 K/UL — LOW (ref 130–400)
POTASSIUM SERPL-MCNC: 5.4 MMOL/L — HIGH (ref 3.5–5)
POTASSIUM SERPL-MCNC: 5.4 MMOL/L — HIGH (ref 3.5–5)
POTASSIUM SERPL-SCNC: 5.4 MMOL/L — HIGH (ref 3.5–5)
POTASSIUM SERPL-SCNC: 5.4 MMOL/L — HIGH (ref 3.5–5)
PROT SERPL-MCNC: 5.6 G/DL — LOW (ref 6–8)
RBC # BLD: 3.09 M/UL — LOW (ref 4.2–5.4)
RBC # FLD: 17.7 % — HIGH (ref 11.5–14.5)
SARS-COV-2 RNA SPEC QL NAA+PROBE: SIGNIFICANT CHANGE UP
SODIUM SERPL-SCNC: 144 MMOL/L — SIGNIFICANT CHANGE UP (ref 135–146)
SODIUM SERPL-SCNC: 147 MMOL/L — HIGH (ref 135–146)
WBC # BLD: 3.96 K/UL — LOW (ref 4.8–10.8)
WBC # FLD AUTO: 3.96 K/UL — LOW (ref 4.8–10.8)

## 2022-02-22 PROCEDURE — 99233 SBSQ HOSP IP/OBS HIGH 50: CPT

## 2022-02-22 RX ORDER — SODIUM ZIRCONIUM CYCLOSILICATE 10 G/10G
5 POWDER, FOR SUSPENSION ORAL
Refills: 0 | Status: DISCONTINUED | OUTPATIENT
Start: 2022-02-22 | End: 2022-02-23

## 2022-02-22 RX ORDER — SODIUM CHLORIDE 9 MG/ML
500 INJECTION, SOLUTION INTRAVENOUS ONCE
Refills: 0 | Status: COMPLETED | OUTPATIENT
Start: 2022-02-22 | End: 2022-02-22

## 2022-02-22 RX ADMIN — SODIUM ZIRCONIUM CYCLOSILICATE 5 GRAM(S): 10 POWDER, FOR SUSPENSION ORAL at 19:07

## 2022-02-22 RX ADMIN — Medication 25 MILLIGRAM(S): at 19:07

## 2022-02-22 RX ADMIN — POLYETHYLENE GLYCOL 3350 17 GRAM(S): 17 POWDER, FOR SOLUTION ORAL at 13:21

## 2022-02-22 RX ADMIN — PANTOPRAZOLE SODIUM 40 MILLIGRAM(S): 20 TABLET, DELAYED RELEASE ORAL at 19:07

## 2022-02-22 RX ADMIN — Medication 25 MILLIGRAM(S): at 05:12

## 2022-02-22 RX ADMIN — PANTOPRAZOLE SODIUM 40 MILLIGRAM(S): 20 TABLET, DELAYED RELEASE ORAL at 05:12

## 2022-02-22 RX ADMIN — SODIUM CHLORIDE 125 MILLILITER(S): 9 INJECTION, SOLUTION INTRAVENOUS at 19:11

## 2022-02-22 RX ADMIN — Medication 650 MILLIGRAM(S): at 22:23

## 2022-02-22 RX ADMIN — SENNA PLUS 2 TABLET(S): 8.6 TABLET ORAL at 22:23

## 2022-02-22 NOTE — PROGRESS NOTE ADULT - TIME BILLING
charting, resident teaching rounds, and interdisciplinary planning.
charting ,resident teaching rounds, and interdisciplinary planning.
charting, face to face with patient, and interdisciplinary planning.
charting, resident teaching rounds, and interdisciplinary planning.

## 2022-02-22 NOTE — PROGRESS NOTE ADULT - SUBJECTIVE AND OBJECTIVE BOX
seen and examined  no distress   lying comfortable         PAST HISTORY  --------------------------------------------------------------------------------  No significant changes to PMH, PSH, FHx, SHx, unless otherwise noted    ALLERGIES & MEDICATIONS  --------------------------------------------------------------------------------  Allergies    aspirin (Rash)  latex (Unknown)  penicillins (Unknown)    Intolerances      Standing Inpatient Medications  metoprolol tartrate 25 milliGRAM(s) Oral two times a day  pantoprazole  Injectable 40 milliGRAM(s) IV Push two times a day  polyethylene glycol 3350 17 Gram(s) Oral daily  senna 2 Tablet(s) Oral at bedtime    PRN Inpatient Medications  acetaminophen     Tablet .. 650 milliGRAM(s) Oral every 6 hours PRN  aluminum hydroxide/magnesium hydroxide/simethicone Suspension 30 milliLiter(s) Oral every 4 hours PRN  melatonin 3 milliGRAM(s) Oral at bedtime PRN      VITALS/PHYSICAL EXAM  --------------------------------------------------------------------------------  T(C): 36.7 (02-22-22 @ 07:07), Max: 37.2 (02-21-22 @ 20:22)  HR: 75 (02-22-22 @ 07:07) (70 - 75)  BP: 103/58 (02-22-22 @ 07:07) (103/58 - 142/60)  RR: 20 (02-22-22 @ 07:07) (18 - 20)  SpO2: 98% (02-22-22 @ 07:07) (98% - 98%)  Wt(kg): --        02-21-22 @ 07:01  -  02-22-22 @ 07:00  --------------------------------------------------------  IN: 520 mL / OUT: 1200 mL / NET: -680 mL      Physical Exam:  	Gen: NAD  	Pulm: CTA B/L  	CV:  S1S2; no rub  	Abd: +distended  	    LABS/STUDIES  --------------------------------------------------------------------------------              9.0    3.96  >-----------<  105      [02-22-22 @ 07:00]              30.2     144  |  111  |  53  ----------------------------<  75      [02-22-22 @ 07:00]  5.4   |  23  |  2.9        Ca     8.2     [02-22-22 @ 07:00]      Mg     2.2     [02-22-22 @ 07:00]    TPro  5.6  /  Alb  3.1  /  TBili  1.2  /  DBili  x   /  AST  13  /  ALT  6   /  AlkPhos  66  [02-22-22 @ 07:00]    Creatinine Trend:  SCr 2.9 [02-22 @ 07:00]  SCr 2.8 [02-21 @ 21:30]  SCr 3.1 [02-21 @ 05:19]  SCr 3.4 [02-20 @ 10:50]  SCr 3.9 [02-19 @ 04:30]    Urinalysis - [02-01-22 @ 11:51]      Color Light Yellow / Appearance Clear / SG 1.020 / pH 5.5      Gluc Negative / Ketone Negative  / Bili Negative / Urobili <2 mg/dL       Blood Negative / Protein 30 mg/dL / Leuk Est Negative / Nitrite Negative      RBC 1 / WBC 1 / Hyaline 0 / Gran  / Sq Epi  / Non Sq Epi 1 / Bacteria Negative    Urine Creatinine 50      [02-19-22 @ 18:30]  Urine Sodium 83.0      [02-19-22 @ 18:30]  Urine Urea Nitrogen 449      [02-19-22 @ 18:30]    Ferritin 28      [01-19-22 @ 05:43]  Lipid: chol 93, , HDL 38, LDL --      [01-18-22 @ 06:03]

## 2022-02-22 NOTE — PROGRESS NOTE ADULT - ASSESSMENT
76 y F with PMH of severe aortic stenosis, transfusion-dependent anemia (sees Dr. Gaston, thought to be 2/2 CKD + obscure GI bleeding), HTN, morbid obesity, CKD presents with chest pain and SOB while getting a blood transfusion.    # EVAN on CKD 4 / baseline creat ~2 / ?pre-renal iso acute blood loss anemia / obstructive non oliguric after culver   # Acute on chronic iron defic anemia receiving wkly RBC transfusions and WOODY   # UGIB / s/p capsule endoscopy +bleeding in stomach/duodenal AVM  # Acute decompensated heart failure / HFpEF / Severe AS   # HTN  - non oliguric  - creat stable   - start lokelma 5 q 12   - renal ultrasound noted, R w/o hydronephrosis, L kidney not visualized   - check phos level, 25OH vitD, iPTH  - monitor hgb, stable now s/p RBC transfusions / appreciate GI f/u   - no indication for RRT  - will follow

## 2022-02-22 NOTE — PROGRESS NOTE ADULT - PROVIDER SPECIALTY LIST ADULT
Gastroenterology
Hospitalist
Nephrology
Gastroenterology
Gastroenterology
Hospitalist
Internal Medicine
Internal Medicine
Gastroenterology
Hospitalist
Hospitalist
Nephrology
Hospitalist

## 2022-02-22 NOTE — PROGRESS NOTE ADULT - SUBJECTIVE AND OBJECTIVE BOX
LATRICIA ARREAGA  76y  Female      Patient is a 76y old  Female who presents with a chief complaint of Chest pain, anemia, melena (22 Feb 2022 08:27)      INTERVAL HPI/OVERNIGHT EVENTS: no acute events overnight. no melena or hematochezia. no major complaints. no nursing concerns.       REVIEW OF SYSTEMS:  CONSTITUTIONAL: No fever, weight loss, or fatigue  RESPIRATORY: No cough, wheezing, chills or hemoptysis; No shortness of breath  CARDIOVASCULAR: No chest pain, palpitations, dizziness, or leg swelling  GASTROINTESTINAL: No abdominal or epigastric pain. No nausea, vomiting, or hematemesis; No diarrhea or constipation. No melena or hematochezia.  GENITOURINARY: No dysuria, frequency, hematuria, or incontinence  NEUROLOGICAL: No headaches, memory loss, loss of strength, numbness, or tremors  SKIN: No itching, burning, rashes, or lesions   MUSCULOSKELETAL: No joint pain or swelling; No muscle, back, or extremity pain  PSYCHIATRIC: No depression, anxiety, mood swings, or difficulty sleeping  All other review of systems negative    VITALS  T(C): 36.7 (02-22-22 @ 07:07), Max: 37.2 (02-21-22 @ 20:22)  HR: 75 (02-22-22 @ 07:07) (70 - 75)  BP: 103/58 (02-22-22 @ 07:07) (103/58 - 142/60)  RR: 20 (02-22-22 @ 07:07) (18 - 20)  SpO2: 98% (02-22-22 @ 07:07) (98% - 98%)  Wt(kg): --Vital Signs Last 24 Hrs  T(C): 36.7 (22 Feb 2022 07:07), Max: 37.2 (21 Feb 2022 20:22)  T(F): 98.1 (22 Feb 2022 07:07), Max: 99 (21 Feb 2022 20:22)  HR: 75 (22 Feb 2022 07:07) (70 - 75)  BP: 103/58 (22 Feb 2022 07:07) (103/58 - 142/60)  BP(mean): --  RR: 20 (22 Feb 2022 07:07) (18 - 20)  SpO2: 98% (22 Feb 2022 07:07) (98% - 98%)      02-21-22 @ 07:01  -  02-22-22 @ 07:00  --------------------------------------------------------  IN: 520 mL / OUT: 1200 mL / NET: -680 mL    PHYSICAL EXAM:  GENERAL: elderly F, NAD, non toxic appearing  EYES: anicteric sclera, non injected conjunctiva, EOMI  ENT: poor dentition, dry MM  PSYCH: no agitation, baseline mentation  NERVOUS SYSTEM:  Alert & Oriented X3, CN 2-12 grossly intact  PULMONARY: Clear to percussion bilaterally; No rales, rhonchi, wheezing, or rubs  CARDIOVASCULAR: Regular rate and rhythm; No murmurs, rubs, or gallops  GI: Soft, Nontender, Nondistended; Bowel sounds present  EXTREMITIES:  2+ Peripheral Pulses, No clubbing, cyanosis, or edema  SKIN: No rashes or lesions    Consultant(s) Notes Reviewed:  [x ] YES  [ ] NO    Discussed with Consultants/Other Providers [ x] YES     LABS                          9.0    3.96  )-----------( 105      ( 22 Feb 2022 07:00 )             30.2     02-22    144  |  111<H>  |  53<H>  ----------------------------<  75  5.4<H>   |  23  |  2.9<H>    Ca    8.2<L>      22 Feb 2022 07:00  Mg     2.2     02-22    TPro  5.6<L>  /  Alb  3.1<L>  /  TBili  1.2  /  DBili  x   /  AST  13  /  ALT  6   /  AlkPhos  66  02-22    RADIOLOGY & ADDITIONAL TESTS:  - no images 2/22  Imaging Personally Reviewed:  [ ] YES  [ ] NO    HEALTH ISSUES - PROBLEM Dx:  Suspected deep vein thrombosis (DVT)        MEDICATIONS  (STANDING):  metoprolol tartrate 25 milliGRAM(s) Oral two times a day  pantoprazole  Injectable 40 milliGRAM(s) IV Push two times a day  polyethylene glycol 3350 17 Gram(s) Oral daily  senna 2 Tablet(s) Oral at bedtime  sodium zirconium cyclosilicate 5 Gram(s) Oral two times a day    MEDICATIONS  (PRN):  acetaminophen     Tablet .. 650 milliGRAM(s) Oral every 6 hours PRN Temp greater or equal to 38C (100.4F), Mild Pain (1 - 3)  aluminum hydroxide/magnesium hydroxide/simethicone Suspension 30 milliLiter(s) Oral every 4 hours PRN Dyspepsia  melatonin 3 milliGRAM(s) Oral at bedtime PRN Insomnia

## 2022-02-22 NOTE — PROGRESS NOTE ADULT - ASSESSMENT
76 y F with PMH of severe aortic stenosis, transfusion-dependent anemia (sees Dr. Gaston, thought to be 2/2 CKD + obscure GI bleeding), HTN, morbid obesity, CKD presents with chest pain and SOB while getting a blood transfusion.    #Chronic DARRIN, pancytopenia  #Melena and hematochezia  - Patient receives Retacrit 2000 U weekly and blood transfusions every 1-2 weeks, most recently today  - EGD and colonoscopy in 2020 showed gastritis, diverticulosis, and hemorrhoids   - Hgb 6.5 on admission, s/p 1u pRBC-->7.4-->6.9-->8.9-->7.7->s/p 1u pRBC-->10.1-->9.4; stable   - GI consult       s/p capsule- positive for bleeding in the stomach and AVM in duodenum   - high risk for anesthesia so GI would like to monitor CBC over the weekend to determine risk vs benefit of EGD intervention  - resumed DASH diet for now  - will maintain active type and screen and large bore IV access  - monitor serial CBCs and transfuse for hgb <7    # EVAN on CKD  - baseline ~1.9-2.2  - currently 3.6-->3.9-->3.1-->2.9  - unsure etiology  - Renal US unremarkable   - urine lytes ordered  - Neph c/s  - have to be judicious with IVF as patient is getting unit of blood and has severe AS; placed order for LR 500cc over 4hrs    # Acute hypoxic respiratory failure  - CXR on admission b/l opacities  - likely 2/2 to TACO in setting of recent blood transfusion  - s/p lasix 40mg IV x1 and repeat 40mg IV x1  - serial CXR  - weaned to RA    #Chest pain  - likely 2/2 symptomatic anemia  - Troponin negative x1; currently pain-free  - EKG NSR, RBBB, no ST changes  - Trend troponin    #Chronic diastolic heart failure  #Severe AS  #Shortness of breath  - TTE 9/2021: EF 65%, grade II DD, severe AS, mod MR, mild TR  - Volume overloaded on exam  - bibasilar opacities on CXR- improved on serial imaging  - s/p Lasix IV 40 mg with pRBC transfusion an additional 20mg IV x1; improved    #RLE swelling; r/o DVT  - VA Duplex Lower Ext Vein Scan, Bilat (02.15.22 @ 21:40)-No evidence of deep venous thrombosis or superficial thrombophlebitis in   the bilateral lower extremities. Peroneal vein is not visualized right side    #Periumbilical abdominal pain  - Last BM 4 days ago  - Abdominal US  - Miralax, senna    #HTN  - Resume home metoprolol    DVT Prophylaxis: SCDs  GI Prophylaxis: Protonix IV  Diet: Clear liquids  Activity: AAT    #Progress Note Handoff  Pending (specify):  monitor CBC, GI f/up for possible EGD on Today? PT eval  Family discussion: patient updated and in agreement of plan< from: Xray Chest 1 View AP/PA (02.20.22 @ 10:12) >              Disposition: unknown at this time but will go back to [facility]

## 2022-02-22 NOTE — PROGRESS NOTE ADULT - REASON FOR ADMISSION
Chest pain, anemia, melena

## 2022-02-23 ENCOUNTER — TRANSCRIPTION ENCOUNTER (OUTPATIENT)
Age: 77
End: 2022-02-23

## 2022-02-23 ENCOUNTER — APPOINTMENT (OUTPATIENT)
Dept: INFUSION THERAPY | Facility: CLINIC | Age: 77
End: 2022-02-23

## 2022-02-23 VITALS
RESPIRATION RATE: 19 BRPM | DIASTOLIC BLOOD PRESSURE: 48 MMHG | SYSTOLIC BLOOD PRESSURE: 107 MMHG | TEMPERATURE: 96 F | HEART RATE: 69 BPM

## 2022-02-23 LAB
ALBUMIN SERPL ELPH-MCNC: 3.1 G/DL — LOW (ref 3.5–5.2)
ALP SERPL-CCNC: 62 U/L — SIGNIFICANT CHANGE UP (ref 30–115)
ALT FLD-CCNC: 6 U/L — SIGNIFICANT CHANGE UP (ref 0–41)
ANION GAP SERPL CALC-SCNC: 6 MMOL/L — LOW (ref 7–14)
ANION GAP SERPL CALC-SCNC: 7 MMOL/L — SIGNIFICANT CHANGE UP (ref 7–14)
AST SERPL-CCNC: 12 U/L — SIGNIFICANT CHANGE UP (ref 0–41)
BASOPHILS # BLD AUTO: 0.01 K/UL — SIGNIFICANT CHANGE UP (ref 0–0.2)
BASOPHILS NFR BLD AUTO: 0.3 % — SIGNIFICANT CHANGE UP (ref 0–1)
BILIRUB SERPL-MCNC: 0.9 MG/DL — SIGNIFICANT CHANGE UP (ref 0.2–1.2)
BUN SERPL-MCNC: 51 MG/DL — HIGH (ref 10–20)
BUN SERPL-MCNC: 52 MG/DL — HIGH (ref 10–20)
CALCIUM SERPL-MCNC: 8 MG/DL — LOW (ref 8.5–10.1)
CALCIUM SERPL-MCNC: 8.3 MG/DL — LOW (ref 8.5–10.1)
CHLORIDE SERPL-SCNC: 112 MMOL/L — HIGH (ref 98–110)
CHLORIDE SERPL-SCNC: 114 MMOL/L — HIGH (ref 98–110)
CO2 SERPL-SCNC: 25 MMOL/L — SIGNIFICANT CHANGE UP (ref 17–32)
CO2 SERPL-SCNC: 26 MMOL/L — SIGNIFICANT CHANGE UP (ref 17–32)
CREAT SERPL-MCNC: 2.7 MG/DL — HIGH (ref 0.7–1.5)
CREAT SERPL-MCNC: 2.8 MG/DL — HIGH (ref 0.7–1.5)
EOSINOPHIL # BLD AUTO: 0.34 K/UL — SIGNIFICANT CHANGE UP (ref 0–0.7)
EOSINOPHIL NFR BLD AUTO: 11.3 % — HIGH (ref 0–8)
GLUCOSE SERPL-MCNC: 116 MG/DL — HIGH (ref 70–99)
GLUCOSE SERPL-MCNC: 97 MG/DL — SIGNIFICANT CHANGE UP (ref 70–99)
HCT VFR BLD CALC: 29.2 % — LOW (ref 37–47)
HGB BLD-MCNC: 8.7 G/DL — LOW (ref 12–16)
IMM GRANULOCYTES NFR BLD AUTO: 0.3 % — SIGNIFICANT CHANGE UP (ref 0.1–0.3)
LYMPHOCYTES # BLD AUTO: 0.47 K/UL — LOW (ref 1.2–3.4)
LYMPHOCYTES # BLD AUTO: 15.7 % — LOW (ref 20.5–51.1)
MAGNESIUM SERPL-MCNC: 2.3 MG/DL — SIGNIFICANT CHANGE UP (ref 1.8–2.4)
MCHC RBC-ENTMCNC: 29.3 PG — SIGNIFICANT CHANGE UP (ref 27–31)
MCHC RBC-ENTMCNC: 29.8 G/DL — LOW (ref 32–37)
MCV RBC AUTO: 98.3 FL — SIGNIFICANT CHANGE UP (ref 81–99)
MONOCYTES # BLD AUTO: 0.55 K/UL — SIGNIFICANT CHANGE UP (ref 0.1–0.6)
MONOCYTES NFR BLD AUTO: 18.3 % — HIGH (ref 1.7–9.3)
NEUTROPHILS # BLD AUTO: 1.62 K/UL — SIGNIFICANT CHANGE UP (ref 1.4–6.5)
NEUTROPHILS NFR BLD AUTO: 54.1 % — SIGNIFICANT CHANGE UP (ref 42.2–75.2)
NRBC # BLD: 0 /100 WBCS — SIGNIFICANT CHANGE UP (ref 0–0)
PLATELET # BLD AUTO: 101 K/UL — LOW (ref 130–400)
POTASSIUM SERPL-MCNC: 4.6 MMOL/L — SIGNIFICANT CHANGE UP (ref 3.5–5)
POTASSIUM SERPL-MCNC: 5.1 MMOL/L — HIGH (ref 3.5–5)
POTASSIUM SERPL-SCNC: 4.6 MMOL/L — SIGNIFICANT CHANGE UP (ref 3.5–5)
POTASSIUM SERPL-SCNC: 5.1 MMOL/L — HIGH (ref 3.5–5)
PROT SERPL-MCNC: 5.5 G/DL — LOW (ref 6–8)
RBC # BLD: 2.97 M/UL — LOW (ref 4.2–5.4)
RBC # FLD: 17.5 % — HIGH (ref 11.5–14.5)
SODIUM SERPL-SCNC: 145 MMOL/L — SIGNIFICANT CHANGE UP (ref 135–146)
SODIUM SERPL-SCNC: 145 MMOL/L — SIGNIFICANT CHANGE UP (ref 135–146)
WBC # BLD: 3 K/UL — LOW (ref 4.8–10.8)
WBC # FLD AUTO: 3 K/UL — LOW (ref 4.8–10.8)

## 2022-02-23 PROCEDURE — 99239 HOSP IP/OBS DSCHRG MGMT >30: CPT

## 2022-02-23 RX ORDER — GUAIFENESIN/DEXTROMETHORPHAN 600MG-30MG
10 TABLET, EXTENDED RELEASE 12 HR ORAL ONCE
Refills: 0 | Status: COMPLETED | OUTPATIENT
Start: 2022-02-23 | End: 2022-02-23

## 2022-02-23 RX ORDER — SENNA PLUS 8.6 MG/1
3 TABLET ORAL AT BEDTIME
Refills: 0 | Status: DISCONTINUED | OUTPATIENT
Start: 2022-02-23 | End: 2022-02-23

## 2022-02-23 RX ORDER — HEPARIN SODIUM 5000 [USP'U]/ML
5000 INJECTION INTRAVENOUS; SUBCUTANEOUS EVERY 12 HOURS
Refills: 0 | Status: DISCONTINUED | OUTPATIENT
Start: 2022-02-23 | End: 2022-02-23

## 2022-02-23 RX ORDER — SODIUM ZIRCONIUM CYCLOSILICATE 10 G/10G
5 POWDER, FOR SUSPENSION ORAL ONCE
Refills: 0 | Status: COMPLETED | OUTPATIENT
Start: 2022-02-23 | End: 2022-02-23

## 2022-02-23 RX ORDER — LIDOCAINE 4 G/100G
1 CREAM TOPICAL DAILY
Refills: 0 | Status: DISCONTINUED | OUTPATIENT
Start: 2022-02-23 | End: 2022-02-23

## 2022-02-23 RX ORDER — LIDOCAINE 4 G/100G
1 CREAM TOPICAL EVERY 24 HOURS
Refills: 0 | Status: DISCONTINUED | OUTPATIENT
Start: 2022-02-23 | End: 2022-02-23

## 2022-02-23 RX ADMIN — SODIUM ZIRCONIUM CYCLOSILICATE 5 GRAM(S): 10 POWDER, FOR SUSPENSION ORAL at 06:47

## 2022-02-23 RX ADMIN — Medication 25 MILLIGRAM(S): at 06:00

## 2022-02-23 RX ADMIN — SODIUM ZIRCONIUM CYCLOSILICATE 5 GRAM(S): 10 POWDER, FOR SUSPENSION ORAL at 06:00

## 2022-02-23 RX ADMIN — Medication 10 MILLILITER(S): at 06:00

## 2022-02-23 RX ADMIN — Medication 650 MILLIGRAM(S): at 06:01

## 2022-02-23 RX ADMIN — PANTOPRAZOLE SODIUM 40 MILLIGRAM(S): 20 TABLET, DELAYED RELEASE ORAL at 06:01

## 2022-02-23 NOTE — DISCHARGE NOTE PROVIDER - NSDCMRMEDTOKEN_GEN_ALL_CORE_FT
aluminum hydroxide-magnesium hydroxide 200 mg-200 mg/5 mL oral suspension: 30 milliliter(s) orally every 4 hours, As needed, Dyspepsia  heparin: 5000 unit(s) subcutaneous 3 times a day  Lokelma 10 g oral powder for reconstitution: 10 gram(s) orally once a day   melatonin 3 mg oral tablet: 1 tab(s) orally once a day (at bedtime), As needed, Insomnia  Metoprolol Tartrate 25 mg oral tablet: 1 tab(s) orally 2 times a day  pantoprazole 40 mg oral delayed release tablet: 1 tab(s) orally once a day (before a meal)

## 2022-02-23 NOTE — DISCHARGE NOTE PROVIDER - CARE PROVIDERS DIRECT ADDRESSES
[email or web address]

## 2022-02-23 NOTE — DISCHARGE NOTE PROVIDER - ATTENDING DISCHARGE PHYSICAL EXAMINATION:
GENERAL: elderly F, NAD, non toxic appearing  EYES: anicteric sclera, non injected conjunctiva, EOMI  ENT: poor dentition, dry MM  PSYCH: no agitation, baseline mentation  NERVOUS SYSTEM:  Alert & Oriented X3, CN 2-12 grossly intact  PULMONARY: Clear to auscultation bilaterally; No rales, rhonchi, wheezing, or rubs  CARDIOVASCULAR: Regular rate and rhythm; No murmurs, rubs, or gallops  GI: Soft, Nontender, Nondistended; Bowel sounds present  EXTREMITIES:  2+ Peripheral Pulses, No clubbing, cyanosis, or edema  SKIN: No rashes or lesions

## 2022-02-23 NOTE — DISCHARGE NOTE PROVIDER - NSDCFUSCHEDAPPT_GEN_ALL_CORE_FT
LATRICIA ARREAGA ; 02/23/2022 ; [facility] ; 02/25/2022 ; [facility] LATRICIA Wilson ; 03/02/2022 ; [facility] [address]

## 2022-02-23 NOTE — DISCHARGE NOTE PROVIDER - CARE PROVIDER_API CALL
Radha Valdez)  Internal Medicine; Nephrology  [address] [place] [address]  Phone: [phone]  Fax: [phone]  Follow Up Time:     Prudencio Robles (DO)  Gastroenterology  [address] [place] [address]  Phone: [phone]  Fax: [phone]  Follow Up Time:     Gildardo Bill  [address] [facility] [place] [address]  Phone: [phone]  Fax: [phone]  Follow Up Time:

## 2022-02-23 NOTE — DISCHARGE NOTE NURSING/CASE MANAGEMENT/SOCIAL WORK - PATIENT PORTAL LINK FT
You can access the FollowMyHealth Patient Portal offered by [facility] by registering at the following website: [email or web address]. By joining [employer]’s FollowMyHealth portal, you will also be able to view your health information using other applications (apps) compatible with our system.

## 2022-02-23 NOTE — DISCHARGE NOTE PROVIDER - HOSPITAL COURSE
76 y F with PMH of severe aortic stenosis, transfusion-dependent anemia (sees Dr. Gaston, thought to be 2/2 CKD + obscure GI bleeding), HTN, morbid obesity, CKD presents with chest pain and SOB while getting a blood transfusion today; also c/o blood in stool, black stools, R leg swelling, abdominal pain. Chest pain is substernal and left sided, tightness, not related to exertion (but she has not exerted herself), started after receiving 1 unit pRBCs today, associated with some mild SOB. She reports Right leg pain and swelling but unsure for how long. Also c/o periumbilical pain, last BM 4 days ago. She denies palpitations, fever, cough, n/v, hematemesis, dysuria.    ED VS: T(F): , Max: 97.6  HR:  (71 - 72)  BP:  (129/63 - 146/66)  RR: 18  SpO2:  (97% - 99%)    Labs: Hgb 6.5, trop neg x1, proBNP 1191  Imaging: RLE duplex pending  OLEKSANDR positive in ED. 1 unit pRBC ordered    Pt is admitted for chest pain, symptomatic anemia    Pt received prbc x2 during admission with Hb ~ 9 at the time of d/c. Pt was seen by GI and is s/p capsule- positive for bleeding in the stomach and AVM in duodenum. They also rec OP EGD with Dr Robles     #Chronic DARRIN, pancytopenia  #Melena and hematochezia  - Patient receives Retacrit 2000 U weekly and blood transfusions every 1-2 weeks, most recently today  - EGD and colonoscopy in 2020 showed gastritis, diverticulosis, and hemorrhoids       # EVAN on CKD  - baseline ~1.9-2.2  - currently 3.6-->3.9-->3.1-->2.9  - unsure etiology  - Renal US unremarkable   - urine lytes ordered  - Neph c/s  - have to be judicious with IVF as patient is getting unit of blood and has severe AS; placed order for LR 500cc over 4hrs    # Acute hypoxic respiratory failure  - CXR on admission b/l opacities  - likely 2/2 to TACO in setting of recent blood transfusion  - s/p lasix 40mg IV x1 and repeat 40mg IV x1  - serial CXR  - weaned to RA    #Chest pain  - likely 2/2 symptomatic anemia  - Troponin negative x1; currently pain-free  - EKG NSR, RBBB, no ST changes  - Trend troponin    #Chronic diastolic heart failure  #Severe AS  #Shortness of breath  - TTE 9/2021: EF 65%, grade II DD, severe AS, mod MR, mild TR  - Volume overloaded on exam  - bibasilar opacities on CXR- improved on serial imaging  - s/p Lasix IV 40 mg with pRBC transfusion an additional 20mg IV x1; improved             76 y F with PMH of severe aortic stenosis, transfusion-dependent anemia on Retacrit 2000 U weekly and blood transfusions every 1-2 weeks(sees Dr. Gaston, thought to be 2/2 CKD + obscure GI bleeding), HTN, morbid obesity, CKD presents with chest pain and SOB while getting a blood transfusion today; also c/o blood in stool, black stools, R leg swelling, abdominal pain. Chest pain is substernal and left sided, tightness, not related to exertion (but she has not exerted herself), started after receiving 1 unit pRBCs today, associated with some mild SOB. She reports Right leg pain and swelling but unsure for how long. Also c/o periumbilical pain, last BM 4 days ago. She denies palpitations, fever, cough, n/v, hematemesis, dysuria.    ED VS: T(F): , Max: 97.6  HR:  (71 - 72)  BP:  (129/63 - 146/66)  RR: 18  SpO2:  (97% - 99%)    Labs: Hgb 6.5, trop neg x1, proBNP 1191. OLEKSANDR positive in ED. 1 unit pRBC ordered    Pt is admitted for chest pain, symptomatic anemia    Pt received a total of prbc x2 during admission with Hb ~ 9 at the time of d/c. 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 Pt was deemed high risk for EGD due to severe AS. Melena resolved and GI rec OP EGD with Dr Robles.     For EVAN on CKD with Cr baseline ~1.9-2.2, pt was seen by nephro who rec Lokelma 5mg BID for elevated K. Renal US unremarkable. Pt unable to receive large vol IVF due to severe AS.   Pt also had AHRF on admission with CXR showed b/l opacities and hypoxia was likely 2/2 to TACO in setting of recent blood transfusion. s/p lasix 40mg IV x1 and repeat 40mg IV x1  Pt was eventually weaned to RA.     Stable for d/c to SNF today with OP f/u with GI for EGD, Nephro for EVAN, Heme/Onc for chronic anemia.                  76 y F with PMH of severe aortic stenosis, transfusion-dependent anemia on Retacrit 2000 U weekly and blood transfusions every 1-2 weeks(sees Dr. Gaston, thought to be 2/2 CKD + obscure GI bleeding), HTN, morbid obesity, CKD presents with chest pain and SOB while getting a blood transfusion today; also c/o blood in stool, black stools, R leg swelling, abdominal pain. Chest pain is substernal and left sided, tightness, not related to exertion (but she has not exerted herself), started after receiving 1 unit pRBCs today, associated with some mild SOB. She reports Right leg pain and swelling but unsure for how long. Also c/o periumbilical pain, last BM 4 days ago. She denies palpitations, fever, cough, n/v, hematemesis, dysuria.    ED VS: T(F): , Max: 97.6  HR:  (71 - 72)  BP:  (129/63 - 146/66)  RR: 18  SpO2:  (97% - 99%)    Labs: Hgb 6.5, trop neg x1, proBNP 1191. OLEKSANDR positive in ED. 1 unit pRBC ordered    Pt is admitted for chest pain, symptomatic anemia    Pt received a total of prbc x2 during admission with Hb ~ 9 at the time of d/c. Pt was seen by GI s/p capsule endoscopy +bleeding in stomach/duodenal AVM. Pt was deemed high risk for EGD due to severe AS. Melena resolved and GI rec OP EGD with Dr Robles.     For EVAN on CKD with Cr baseline ~1.9-2.2, pt was seen by nephro who rec Lokelma 5mg BID for elevated K. Renal US unremarkable. Pt unable to receive large vol IVF due to severe AS.   Pt also had AHRF on admission with CXR showed b/l opacities and hypoxia was likely 2/2 to TACO in setting of recent blood transfusion. s/p lasix 40mg IV x1 and repeat 40mg IV x1  Pt was eventually weaned to RA.     Stable for d/c to SNF today with OP f/u with GI for EGD and Cardiology for clearance, Nephro for EVAN, Heme/Onc for chronic anemia.

## 2022-02-23 NOTE — DISCHARGE NOTE PROVIDER - NSDCFUADDAPPT_GEN_ALL_CORE_FT
Please follow up with your Cardiologist for clearance prior to EGD   Please follow up with Dr Robles ( GI ) for an outpatient endoscopy   Please follow up with Dr. Gaston ( Heme Onc ) for your chronic anemia

## 2022-02-23 NOTE — DISCHARGE NOTE PROVIDER - PROVIDER TOKENS
PROVIDER:[TOKEN:[[id number]]],PROVIDER:[TOKEN:[[id number]]],PROVIDER:[TOKEN:[[id number]]]

## 2022-02-23 NOTE — DISCHARGE NOTE PROVIDER - NSDCCPCAREPLAN_GEN_ALL_CORE_FT
PRINCIPAL DISCHARGE DIAGNOSIS  Diagnosis: Rectal bleeding  Assessment and Plan of Treatment: Unclear etiology. You received 2 units of blood during admission and modest improvement of hemoglobin. Please follow up with Gastroenterology as an outpatient to undergo a upper endoscopy in order to help determine the cause of your bleed.      SECONDARY DISCHARGE DIAGNOSES  Diagnosis: Hemoglobin low  Assessment and Plan of Treatment: Due to rectal bleed as well as chronic anemia. Please continue to follow jovani West as an outpatient as scheduled.    Diagnosis: EVAN (acute kidney injury)  Assessment and Plan of Treatment: Your renal function improved with mild fluid replacement. Please follow up with Nephrology as an outpatient in 1 week to make sure your renal function continues to improve. Also please repeat BMP in 1 week to check potassium levels and renal function.

## 2022-02-23 NOTE — DISCHARGE NOTE NURSING/CASE MANAGEMENT/SOCIAL WORK - NSDCPEFALRISK_GEN_ALL_CORE
For information on Fall & Injury Prevention, visit: [email or web address] OR  [email or web address] OR  https://www.cdc.gov/steadi/patient.html

## 2022-02-23 NOTE — DISCHARGE NOTE NURSING/CASE MANAGEMENT/SOCIAL WORK - NSPROEXTENSIONSOFSELF_GEN_A_NUR
none/wheelchair Detail Level: Generalized Continue Regimen: Betamethasone dip cream as needed for flares Detail Level: Zone Plan: Can use Betamethasone dip cream as directed for flares

## 2022-02-24 ENCOUNTER — APPOINTMENT (OUTPATIENT)
Dept: INFUSION THERAPY | Facility: CLINIC | Age: 77
End: 2022-02-24

## 2022-02-24 LAB — VWF:RCO ACT/NOR PPP PL AGG: 198 %

## 2022-02-25 ENCOUNTER — APPOINTMENT (OUTPATIENT)
Dept: INFUSION THERAPY | Facility: CLINIC | Age: 77
End: 2022-02-25

## 2022-03-02 ENCOUNTER — RESULT CHARGE (OUTPATIENT)
Age: 77
End: 2022-03-02

## 2022-03-02 ENCOUNTER — APPOINTMENT (OUTPATIENT)
Dept: CARDIOLOGY | Facility: CLINIC | Age: 77
End: 2022-03-02
Payer: MEDICARE

## 2022-03-02 VITALS
BODY MASS INDEX: 41.32 KG/M2 | TEMPERATURE: 97.5 F | WEIGHT: 256 LBS | SYSTOLIC BLOOD PRESSURE: 118 MMHG | DIASTOLIC BLOOD PRESSURE: 80 MMHG | HEART RATE: 72 BPM

## 2022-03-02 VITALS — WEIGHT: 256 LBS | BODY MASS INDEX: 41.14 KG/M2 | HEIGHT: 66 IN

## 2022-03-02 DIAGNOSIS — R07.9 CHEST PAIN, UNSPECIFIED: ICD-10-CM

## 2022-03-02 PROBLEM — I35.0 NONRHEUMATIC AORTIC (VALVE) STENOSIS: Chronic | Status: ACTIVE | Noted: 2022-02-16

## 2022-03-02 PROCEDURE — 99214 OFFICE O/P EST MOD 30 MIN: CPT

## 2022-03-02 PROCEDURE — 93000 ELECTROCARDIOGRAM COMPLETE: CPT

## 2022-03-02 NOTE — HISTORY OF PRESENT ILLNESS
[FreeTextEntry1] : 77 y/o F with Hx CAD, HTN,and angina pectoris on Metoprolol presents for cardiac follow up visit. Pt reports left sided chest tightness radiating to her upper abdomen. She denies SOB, dyspnea on exertion, palpitations, leg swelling, or syncope. Pt reports no other complaints. Pt is complaint with her medications.\par \par patient had an echo which showed moderate aortic stenosis\par gradient of 32mm hg \par lv function is normal\par \par no symptoms\par \par repeat echo is non diagnostic\par will follow medically\par possible cath in 3 mos. or sooner if symptoms get worse\par \par no chf\par no angina\par no sob\par no syncope\par \par hct of 23 due to gi bleed\par needs blood transfusion

## 2022-03-04 ENCOUNTER — APPOINTMENT (OUTPATIENT)
Dept: HEMATOLOGY ONCOLOGY | Facility: CLINIC | Age: 77
End: 2022-03-04

## 2022-03-04 ENCOUNTER — LABORATORY RESULT (OUTPATIENT)
Age: 77
End: 2022-03-04

## 2022-03-04 ENCOUNTER — INPATIENT (INPATIENT)
Facility: HOSPITAL | Age: 77
LOS: 11 days | Discharge: NURSING, REHAB OR LONG-TERM CARE | End: 2022-03-16
Attending: INTERNAL MEDICINE | Admitting: INTERNAL MEDICINE
Payer: MEDICARE

## 2022-03-04 VITALS
DIASTOLIC BLOOD PRESSURE: 44 MMHG | HEIGHT: 67 IN | TEMPERATURE: 97 F | RESPIRATION RATE: 20 BRPM | OXYGEN SATURATION: 99 % | WEIGHT: 250 LBS | SYSTOLIC BLOOD PRESSURE: 109 MMHG | HEART RATE: 74 BPM

## 2022-03-04 DIAGNOSIS — Z87.19 PERSONAL HISTORY OF OTHER DISEASES OF THE DIGESTIVE SYSTEM: Chronic | ICD-10-CM

## 2022-03-04 LAB
ALBUMIN SERPL ELPH-MCNC: 3.3 G/DL — LOW (ref 3.5–5.2)
ALP SERPL-CCNC: 66 U/L — SIGNIFICANT CHANGE UP (ref 30–115)
ALT FLD-CCNC: 6 U/L — SIGNIFICANT CHANGE UP (ref 0–41)
ANION GAP SERPL CALC-SCNC: 8 MMOL/L — SIGNIFICANT CHANGE UP (ref 7–14)
ANISOCYTOSIS BLD QL: SIGNIFICANT CHANGE UP
APTT BLD: 32 SEC — SIGNIFICANT CHANGE UP (ref 27–39.2)
AST SERPL-CCNC: 13 U/L — SIGNIFICANT CHANGE UP (ref 0–41)
BASOPHILS # BLD AUTO: 0.01 K/UL — SIGNIFICANT CHANGE UP (ref 0–0.2)
BASOPHILS # BLD AUTO: 0.04 K/UL — SIGNIFICANT CHANGE UP (ref 0–0.2)
BASOPHILS NFR BLD AUTO: 0.3 % — SIGNIFICANT CHANGE UP (ref 0–1)
BASOPHILS NFR BLD AUTO: 0.9 % — SIGNIFICANT CHANGE UP (ref 0–1)
BILIRUB SERPL-MCNC: 0.6 MG/DL — SIGNIFICANT CHANGE UP (ref 0.2–1.2)
BLD GP AB SCN SERPL QL: SIGNIFICANT CHANGE UP
BUN SERPL-MCNC: 60 MG/DL — HIGH (ref 10–20)
CALCIUM SERPL-MCNC: 8.3 MG/DL — LOW (ref 8.5–10.1)
CHLORIDE SERPL-SCNC: 106 MMOL/L — SIGNIFICANT CHANGE UP (ref 98–110)
CO2 SERPL-SCNC: 25 MMOL/L — SIGNIFICANT CHANGE UP (ref 17–32)
CREAT SERPL-MCNC: 2.7 MG/DL — HIGH (ref 0.7–1.5)
EGFR: 18 ML/MIN/1.73M2 — LOW
EOSINOPHIL # BLD AUTO: 0.09 K/UL — SIGNIFICANT CHANGE UP (ref 0–0.7)
EOSINOPHIL # BLD AUTO: 0.24 K/UL — SIGNIFICANT CHANGE UP (ref 0–0.7)
EOSINOPHIL NFR BLD AUTO: 3 % — SIGNIFICANT CHANGE UP (ref 0–8)
EOSINOPHIL NFR BLD AUTO: 5.2 % — SIGNIFICANT CHANGE UP (ref 0–8)
GIANT PLATELETS BLD QL SMEAR: PRESENT — SIGNIFICANT CHANGE UP
GLUCOSE SERPL-MCNC: 94 MG/DL — SIGNIFICANT CHANGE UP (ref 70–99)
HCT VFR BLD CALC: 15.5 % — LOW (ref 37–47)
HCT VFR BLD CALC: 16 % — LOW (ref 37–47)
HGB BLD-MCNC: 4.5 G/DL — CRITICAL LOW (ref 12–16)
HGB BLD-MCNC: 4.6 G/DL — CRITICAL LOW (ref 12–16)
HYPOCHROMIA BLD QL: SIGNIFICANT CHANGE UP
IMM GRANULOCYTES NFR BLD AUTO: 1 % — HIGH (ref 0.1–0.3)
INR BLD: 1.02 RATIO — SIGNIFICANT CHANGE UP (ref 0.65–1.3)
LYMPHOCYTES # BLD AUTO: 0.34 K/UL — LOW (ref 1.2–3.4)
LYMPHOCYTES # BLD AUTO: 0.91 K/UL — LOW (ref 1.2–3.4)
LYMPHOCYTES # BLD AUTO: 11.3 % — LOW (ref 20.5–51.1)
LYMPHOCYTES # BLD AUTO: 20 % — LOW (ref 20.5–51.1)
MACROCYTES BLD QL: SLIGHT — SIGNIFICANT CHANGE UP
MANUAL SMEAR VERIFICATION: SIGNIFICANT CHANGE UP
MCHC RBC-ENTMCNC: 28.8 G/DL — LOW (ref 32–37)
MCHC RBC-ENTMCNC: 28.9 PG — SIGNIFICANT CHANGE UP (ref 27–31)
MCHC RBC-ENTMCNC: 29 G/DL — LOW (ref 32–37)
MCHC RBC-ENTMCNC: 29 PG — SIGNIFICANT CHANGE UP (ref 27–31)
MCV RBC AUTO: 100 FL — HIGH (ref 81–99)
MCV RBC AUTO: 100.6 FL — HIGH (ref 81–99)
MICROCYTES BLD QL: SLIGHT — SIGNIFICANT CHANGE UP
MONOCYTES # BLD AUTO: 0.1 K/UL — SIGNIFICANT CHANGE UP (ref 0.1–0.6)
MONOCYTES # BLD AUTO: 0.28 K/UL — SIGNIFICANT CHANGE UP (ref 0.1–0.6)
MONOCYTES NFR BLD AUTO: 3.3 % — SIGNIFICANT CHANGE UP (ref 1.7–9.3)
MONOCYTES NFR BLD AUTO: 6.1 % — SIGNIFICANT CHANGE UP (ref 1.7–9.3)
MYELOCYTES NFR BLD: 0.9 % — HIGH (ref 0–0)
NEUTROPHILS # BLD AUTO: 2.43 K/UL — SIGNIFICANT CHANGE UP (ref 1.4–6.5)
NEUTROPHILS # BLD AUTO: 2.96 K/UL — SIGNIFICANT CHANGE UP (ref 1.4–6.5)
NEUTROPHILS NFR BLD AUTO: 65.2 % — SIGNIFICANT CHANGE UP (ref 42.2–75.2)
NEUTROPHILS NFR BLD AUTO: 81.1 % — HIGH (ref 42.2–75.2)
NRBC # BLD: 0 /100 WBCS — SIGNIFICANT CHANGE UP (ref 0–0)
NRBC # BLD: 1 /100 — HIGH (ref 0–0)
NRBC # BLD: SIGNIFICANT CHANGE UP /100 WBCS (ref 0–0)
PLAT MORPH BLD: ABNORMAL
PLATELET # BLD AUTO: 121 K/UL — LOW (ref 130–400)
PLATELET # BLD AUTO: 134 K/UL — SIGNIFICANT CHANGE UP (ref 130–400)
POIKILOCYTOSIS BLD QL AUTO: SLIGHT — SIGNIFICANT CHANGE UP
POLYCHROMASIA BLD QL SMEAR: SLIGHT — SIGNIFICANT CHANGE UP
POTASSIUM SERPL-MCNC: 5.3 MMOL/L — HIGH (ref 3.5–5)
POTASSIUM SERPL-SCNC: 5.3 MMOL/L — HIGH (ref 3.5–5)
PROT SERPL-MCNC: 5.7 G/DL — LOW (ref 6–8)
PROTHROM AB SERPL-ACNC: 11.7 SEC — SIGNIFICANT CHANGE UP (ref 9.95–12.87)
RBC # BLD: 1.55 M/UL — LOW (ref 4.2–5.4)
RBC # BLD: 1.59 M/UL — LOW (ref 4.2–5.4)
RBC # FLD: 18.1 % — HIGH (ref 11.5–14.5)
RBC # FLD: 18.1 % — HIGH (ref 11.5–14.5)
RBC BLD AUTO: ABNORMAL
SARS-COV-2 RNA SPEC QL NAA+PROBE: SIGNIFICANT CHANGE UP
SODIUM SERPL-SCNC: 139 MMOL/L — SIGNIFICANT CHANGE UP (ref 135–146)
STOMATOCYTES BLD QL SMEAR: SLIGHT — SIGNIFICANT CHANGE UP
VARIANT LYMPHS # BLD: 1.7 % — SIGNIFICANT CHANGE UP (ref 0–5)
WBC # BLD: 3 K/UL — LOW (ref 4.8–10.8)
WBC # BLD: 4.54 K/UL — LOW (ref 4.8–10.8)
WBC # FLD AUTO: 3 K/UL — LOW (ref 4.8–10.8)
WBC # FLD AUTO: 4.54 K/UL — LOW (ref 4.8–10.8)

## 2022-03-04 PROCEDURE — 71045 X-RAY EXAM CHEST 1 VIEW: CPT | Mod: 26

## 2022-03-04 PROCEDURE — 99223 1ST HOSP IP/OBS HIGH 75: CPT

## 2022-03-04 PROCEDURE — 99222 1ST HOSP IP/OBS MODERATE 55: CPT

## 2022-03-04 PROCEDURE — 93010 ELECTROCARDIOGRAM REPORT: CPT

## 2022-03-04 PROCEDURE — 99285 EMERGENCY DEPT VISIT HI MDM: CPT | Mod: 25

## 2022-03-04 RX ORDER — DEXAMETHASONE 0.5 MG/5ML
10 ELIXIR ORAL ONCE
Refills: 0 | Status: COMPLETED | OUTPATIENT
Start: 2022-03-04 | End: 2022-03-04

## 2022-03-04 RX ORDER — METOPROLOL TARTRATE 50 MG
25 TABLET ORAL
Refills: 0 | Status: DISCONTINUED | OUTPATIENT
Start: 2022-03-04 | End: 2022-03-16

## 2022-03-04 RX ORDER — ACETAMINOPHEN 500 MG
650 TABLET ORAL ONCE
Refills: 0 | Status: COMPLETED | OUTPATIENT
Start: 2022-03-04 | End: 2022-03-04

## 2022-03-04 RX ORDER — PANTOPRAZOLE SODIUM 20 MG/1
40 TABLET, DELAYED RELEASE ORAL EVERY 12 HOURS
Refills: 0 | Status: DISCONTINUED | OUTPATIENT
Start: 2022-03-04 | End: 2022-03-10

## 2022-03-04 RX ORDER — ACETAMINOPHEN 500 MG
650 TABLET ORAL EVERY 6 HOURS
Refills: 0 | Status: DISCONTINUED | OUTPATIENT
Start: 2022-03-04 | End: 2022-03-16

## 2022-03-04 RX ORDER — CHLORHEXIDINE GLUCONATE 213 G/1000ML
1 SOLUTION TOPICAL
Refills: 0 | Status: DISCONTINUED | OUTPATIENT
Start: 2022-03-04 | End: 2022-03-16

## 2022-03-04 RX ORDER — PANTOPRAZOLE SODIUM 20 MG/1
80 TABLET, DELAYED RELEASE ORAL ONCE
Refills: 0 | Status: COMPLETED | OUTPATIENT
Start: 2022-03-04 | End: 2022-03-04

## 2022-03-04 RX ORDER — FUROSEMIDE 40 MG
40 TABLET ORAL EVERY 12 HOURS
Refills: 0 | Status: DISCONTINUED | OUTPATIENT
Start: 2022-03-04 | End: 2022-03-08

## 2022-03-04 RX ORDER — FUROSEMIDE 40 MG
40 TABLET ORAL ONCE
Refills: 0 | Status: COMPLETED | OUTPATIENT
Start: 2022-03-04 | End: 2022-03-04

## 2022-03-04 RX ORDER — ONDANSETRON 8 MG/1
4 TABLET, FILM COATED ORAL EVERY 8 HOURS
Refills: 0 | Status: DISCONTINUED | OUTPATIENT
Start: 2022-03-04 | End: 2022-03-16

## 2022-03-04 RX ADMIN — Medication 40 MILLIGRAM(S): at 17:06

## 2022-03-04 RX ADMIN — Medication 102 MILLIGRAM(S): at 14:13

## 2022-03-04 RX ADMIN — PANTOPRAZOLE SODIUM 80 MILLIGRAM(S): 20 TABLET, DELAYED RELEASE ORAL at 14:13

## 2022-03-04 RX ADMIN — PANTOPRAZOLE SODIUM 40 MILLIGRAM(S): 20 TABLET, DELAYED RELEASE ORAL at 17:34

## 2022-03-04 RX ADMIN — Medication 40 MILLIGRAM(S): at 15:28

## 2022-03-04 RX ADMIN — Medication 650 MILLIGRAM(S): at 14:12

## 2022-03-04 NOTE — ED PROVIDER NOTE - INTERPRETATION
Normal sinus rhythm, normal axis, right bundle branch block is old, nonspecific ST abnormalities inferolaterally, unchanged from prior, no ST depression or elevation.  QTc 489, similar to prior, overall no significant changes.

## 2022-03-04 NOTE — ED ADULT NURSE REASSESSMENT NOTE - NS ED NURSE REASSESS COMMENT FT1
patient receiving 1 unit of RPBC, tolerating well with no signs of transfusion reaction noted. Will continue to monitor.

## 2022-03-04 NOTE — H&P ADULT - ASSESSMENT
Assessment and Plan  Case of a 76 year old female patient with multiple comorbidities including severe AS, HTN, CKD, and sciatica who presented to the ED on 03/04 for evaluation of low hemoglobin on outpatient labs, found to be hemodynamically stable with a Hb of 4.5 scheduled for 3 units of packed RBC and found to have evidence of congestion on imaging and pitting edema on exam to be adequately diuresed while being transfused, to be admitted for further evaluation and investigations. Currently hemodynamically stable.      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 and Gastric AVM Bleeding  Moderate Diverticulosis   Grade II Internal Hemorrhoids  Erosive Gastritis  * Anemia Likely Secondary to obscure GI bleeding (from Duodenum and Gastric AVM per recent capsule endoscopy 02/2022 after presenting with melena), Fe Deficiency (TFN S% 5 in 2020 and ferritin 28 in 2022), and CKD per Dr Silver  * Receives Transfusions q1-2 weeks and Retacrit 90821 units weekly  * Recent Admission in 02/2022 for melena: s/p capsule endoscopy showing bleeding from Duodenum and Gastric AVM   * Iron with Total Binding Capacity in AM (01.08.20 @ 08:07)    Iron - Total Binding Capacity.: 372 ug/dL    % Saturation, Iron: 5 %    Iron Total, Serum: 20 ug/dL    Unsaturated Iron Binding Capacity: 352 ug/dL  * Ferritin, Serum in AM (01.19.22 @ 05:43)    Ferritin, Serum: 28 ng/mL  * Hemodynamically stable in ED, OLEKSANDR with brown stool, Hb 4.5 s/p scheduled for 3 units pRBC and lasix IV     - GI team consulted: per previous admission and Dr Paula's risk stratification, patient at high risk for endoscopic interventions in setting of severe AS. Cardiology reconsulted for TAVR versus SAVR discussion. Hematology consulted since patient follows with Dr Silver and he referred her to ED  - Trend CBC closely and transfuse as needed (avoid volume overload with diuresis but keep an eye on BP in setting of severe AS): Hb 4.5 planned for 3 units pRBC so will give IV lasix as well. Active Type and Screen  - Check LDH, haptoglobin, and retic count to rule out hemolytic anemia despite normal T bili. Check Iron Studies  - Trend BUN 60  - Check coagulation profile  - 2 Large IV Bores  - Start IV Protonix 40mg BID (no need for drip per GI)  - Will not start IV Fluid hydration since patient very volume overloaded and will need 3 units pRBC today (will diurese)  - Keep Patient NPO for now  - Off DVT prophylaxis. Check VA duplex venous  - Monitor BM: no melena or hematochezia; monitor for hematemesis: none       Volume Overload in Setting of Severe AS  * Last TTE 02/17/22 EF 74%, severe AS with A 0.8cm2 and mean P 48.9,  mild MR  Acute on Chronic HFmEF Exacerbation  * Cardiologist Dr Paula  * PE leg swelling and diffuse crackles; No pro BNP; CXR with Bilateral perihilar and lower lobe opacities and effusions, left greater than right.  * S/P IV Lasix 40mg x1 dose in ED    - Cardiology Dr Paula consulted for discussion of TAVR/ SAVR in setting of severe AS and high risk for endoscopic procedures (patient needs a solution for GI bleeding which would only be achieved with an endoscopic intervention)  - Monitor in/out  - Daily weight (standing)  - Monitor SaO2 and O2 requirements  - Start IV Lasix 40mg BID (can go up if BP allows but will start low in setting of severe AS and assess after each transfusion)  - Salt restricted diet and Fluid restriction to 1.2L per 24 hours  - Trend electrolytes and keep K>4 and Mg>2  - Check TTE  - Check Iron Studies      Hypertension  * Home lopressor 25mg BID  * ED /44mmHg  - Monitor BP and avoid hypotension in setting of severe AS  - Resume home anti HTN with holding parameters  - Diuretics as above      CKD Progression Versus Cardiorenal (volume overload) or Prerenal EVAN (slow GI bleeding)  * Baseline Cr 1.7-2.2 2022  * Recent admission with Cr 2.7-2.9 throughout  * ED BUN 60, Cr 2.7  - Trend CMP and P daily  - Outpatient nephrology follow up  - Diuresis as above      Sciatica  - PT once more euvolemic  - Pain control      Others  - DVT Prophylaxis: off. Check VA duplex venous LE  - GI Prophylaxis as detailed before  - Diet: NPO  - Code Status: Full      Barriers to learning: NO  Discharge Planning: Patient will be discharged once stable   Plan was communicated with patient and medical team       Constanza Donato MD  PGY - 2 Internal Medicine   [facility]   Assessment and Plan  Case of a 76 year old female patient with multiple comorbidities including severe AS, HTN, CKD, and sciatica who presented to the ED on 03/04 for evaluation of low hemoglobin on outpatient labs, found to be hemodynamically stable with a Hb of 4.5 scheduled for 3 units of packed RBC and found to have evidence of congestion on imaging and pitting edema on exam to be adequately diuresed while being transfused, to be admitted for further evaluation and investigations. Currently hemodynamically stable.      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 and Gastric AVM Bleeding  Moderate Diverticulosis   Grade II Internal Hemorrhoids  Erosive Gastritis  * Anemia Likely Secondary to obscure GI bleeding (from Duodenum and Gastric AVM per recent capsule endoscopy 02/2022 after presenting with melena), Fe Deficiency (TFN S% 5 in 2020 and ferritin 28 in 2022), and CKD per Dr Silver  * Receives Transfusions q1-2 weeks and Retacrit 06250 units weekly  * Recent Admission in 02/2022 for melena: s/p capsule endoscopy showing bleeding from Duodenum and Gastric AVM   * Moderate Diverticulosis and Grade II Internal Hemorrhoids on colonoscopy 10/22/2019  * Erosive Gastritis on EGD 01/09/2020  * Iron with Total Binding Capacity in AM (01.08.20 @ 08:07)    Iron - Total Binding Capacity.: 372 ug/dL    % Saturation, Iron: 5 %    Iron Total, Serum: 20 ug/dL    Unsaturated Iron Binding Capacity: 352 ug/dL  * Ferritin, Serum in AM (01.19.22 @ 05:43)    Ferritin, Serum: 28 ng/mL  * Hemodynamically stable in ED, OLEKSANDR with brown stool, Hb 4.5 s/p scheduled for 3 units pRBC and lasix IV     - GI team consulted: per previous admission and Dr Paula's risk stratification, patient at high risk for endoscopic interventions in setting of severe AS. Cardiology reconsulted for TAVR versus SAVR discussion. Hematology consulted since patient follows with Dr Silver and he referred her to ED  - Trend CBC closely and transfuse as needed (avoid volume overload with diuresis but keep an eye on BP in setting of severe AS): Hb 4.5 planned for 3 units pRBC so will give IV lasix as well. Active Type and Screen  - Check LDH, haptoglobin, and retic count to rule out hemolytic anemia despite normal T bili. Check Iron Studies  - Trend BUN 60  - Check coagulation profile  - 2 Large IV Bores  - Start IV Protonix 40mg BID (no need for drip per GI)  - Will not start IV Fluid hydration since patient very volume overloaded and will need 3 units pRBC today (will diurese)  - Keep Patient NPO for now  - Off DVT prophylaxis. Check VA duplex venous  - Monitor BM: no melena or hematochezia; monitor for hematemesis: none       Volume Overload in Setting of Severe AS  * Last TTE 02/17/22 EF 74%, severe AS with A 0.8cm2 and mean P 48.9,  mild MR  Acute on Chronic HFmEF Exacerbation  * Cardiologist Dr Paula  * PE leg swelling and diffuse crackles; No pro BNP; CXR with Bilateral perihilar and lower lobe opacities and effusions, left greater than right.  * S/P IV Lasix 40mg x1 dose in ED    - Cardiology Dr Paula consulted for discussion of TAVR/ SAVR in setting of severe AS and high risk for endoscopic procedures (patient needs a solution for GI bleeding which would only be achieved with an endoscopic intervention)  - Monitor in/out  - Daily weight (standing)  - Monitor SaO2 and O2 requirements  - Start IV Lasix 40mg BID (can go up if BP allows but will start low in setting of severe AS and assess after each transfusion)  - Salt restricted diet and Fluid restriction to 1.2L per 24 hours  - Trend electrolytes and keep K>4 and Mg>2  - Check TTE  - Check Iron Studies      Hypertension  * Home lopressor 25mg BID  * ED /44mmHg  - Monitor BP and avoid hypotension in setting of severe AS  - Resume home anti HTN with holding parameters  - Diuretics as above      CKD Progression Versus Cardiorenal (volume overload) or Prerenal EVAN (slow GI bleeding)  * Baseline Cr 1.7-2.2 2022  * Recent admission with Cr 2.7-2.9 throughout  * ED BUN 60, Cr 2.7  - Trend CMP and P daily  - Outpatient nephrology follow up  - Diuresis as above      Sciatica  - PT once more euvolemic  - Pain control      Others  - DVT Prophylaxis: off. Check VA duplex venous LE  - GI Prophylaxis as detailed before  - Diet: NPO  - Code Status: Full      Barriers to learning: NO  Discharge Planning: Patient will be discharged once stable   Plan was communicated with patient and medical team       Constanza Donato MD  PGY - 2 Internal Medicine   [facility]   Assessment and Plan  Case of a 76 year old female patient with multiple comorbidities including severe AS, HTN, CKD, sciatica, and anemia recently found to have evidence of bleeding from gastric AVM and duodenum on capsule endoscopy who presented to the ED on 03/04 for evaluation of low hemoglobin on outpatient labs, found to be hemodynamically stable with a Hb of 4.5 scheduled for 3 units of packed RBC and found to have evidence of congestion on imaging and pitting edema on exam to be adequately diuresed while being transfused, to be admitted for further evaluation and investigations. Currently hemodynamically stable.      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 and Gastric AVM Bleeding  Moderate Diverticulosis   Grade II Internal Hemorrhoids  Erosive Gastritis  * Anemia Likely Secondary to obscure GI bleeding (from Duodenum and Gastric AVM per recent capsule endoscopy 02/2022 after presenting with melena), Fe Deficiency (TFN S% 5 in 2020 and ferritin 28 in 2022), and CKD per Dr Silver  * Receives Transfusions q1-2 weeks and Retacrit 15606 units weekly  * Recent Admission in 02/2022 for melena: s/p capsule endoscopy showing bleeding from Duodenum and Gastric AVM   * Moderate Diverticulosis and Grade II Internal Hemorrhoids on colonoscopy 10/22/2019  * Erosive Gastritis on EGD 01/09/2020  * Iron with Total Binding Capacity in AM (01.08.20 @ 08:07)    Iron - Total Binding Capacity.: 372 ug/dL    % Saturation, Iron: 5 %    Iron Total, Serum: 20 ug/dL    Unsaturated Iron Binding Capacity: 352 ug/dL  * Ferritin, Serum in AM (01.19.22 @ 05:43)    Ferritin, Serum: 28 ng/mL  * Hemodynamically stable in ED, OLEKSANDR with brown stool, Hb 4.5 s/p scheduled for 3 units pRBC and lasix IV     - GI team consulted: per previous admission and Dr Paula's risk stratification, patient at high risk for endoscopic interventions in setting of severe AS. Cardiology reconsulted for TAVR versus SAVR discussion. Hematology consulted since patient follows with Dr Silver and he referred her to ED  - Trend CBC closely and transfuse as needed (avoid volume overload with diuresis but keep an eye on BP in setting of severe AS): Hb 4.5 planned for 3 units pRBC so will give IV lasix as well. Active Type and Screen  - Check LDH, haptoglobin, and retic count to rule out hemolytic anemia despite normal T bili. Check Iron Studies  - Trend BUN 60  - Check coagulation profile  - 2 Large IV Bores  - Start IV Protonix 40mg BID (no need for drip per GI)  - Will not start IV Fluid hydration since patient very volume overloaded and will need 3 units pRBC today (will diurese)  - Keep Patient NPO for now  - Off DVT prophylaxis. Check VA duplex venous  - Monitor BM: no melena or hematochezia; monitor for hematemesis: none       Volume Overload in Setting of Severe AS  * Last TTE 02/17/22 EF 74%, severe AS with A 0.8cm2 and mean P 48.9,  mild MR  Acute on Chronic HFmEF Exacerbation  * Cardiologist Dr Paula  * PE leg swelling and diffuse crackles; No pro BNP; CXR with Bilateral perihilar and lower lobe opacities and effusions, left greater than right.  * S/P IV Lasix 40mg x1 dose in ED    - Cardiology Dr Paula consulted for discussion of TAVR/ SAVR in setting of severe AS and high risk for endoscopic procedures (patient needs a solution for GI bleeding which would only be achieved with an endoscopic intervention)  - Monitor in/out  - Daily weight (standing)  - Monitor SaO2 and O2 requirements  - Start IV Lasix 40mg BID (can go up if BP allows but will start low in setting of severe AS and assess after each transfusion)  - Salt restricted diet and Fluid restriction to 1.2L per 24 hours  - Trend electrolytes and keep K>4 and Mg>2  - Check TTE  - Check Iron Studies      Hypertension  * Home lopressor 25mg BID  * ED /44mmHg  - Monitor BP and avoid hypotension in setting of severe AS  - Resume home anti HTN with holding parameters  - Diuretics as above      CKD Progression Versus Cardiorenal (volume overload) or Prerenal EVAN (slow GI bleeding)  * Baseline Cr 1.7-2.2 2022  * Recent admission with Cr 2.7-2.9 throughout  * ED BUN 60, Cr 2.7  - Trend CMP and P daily  - Outpatient nephrology follow up  - Diuresis as above      Sciatica  - PT once more euvolemic  - Pain control      Others  - DVT Prophylaxis: off. Check VA duplex venous LE  - GI Prophylaxis as detailed before  - Diet: NPO  - Code Status: Full      Barriers to learning: NO  Discharge Planning: Patient will be discharged once stable   Plan was communicated with patient and medical team       Constanza Donato MD  PGY - 2 Internal Medicine   [facility]

## 2022-03-04 NOTE — ED PROVIDER NOTE - OBJECTIVE STATEMENT
76 y F with PMH of severe aortic stenosis, transfusion-dependent anemia on Retacrit 2000 U weekly and blood transfusions every 1-2 weeks(sees Dr. Gaston, thought to be 2/2 CKD + obscure GI bleeding), HTN, morbid obesity, CKD presents from dr. bautista's office for transfusion (hgb 4.7 today). Reports mild sob, chest pain. denies melena, hematochezia, hematuria.

## 2022-03-04 NOTE — ED PROVIDER NOTE - PROGRESS NOTE DETAILS
WF: ICU fellow singh initially approved for stepdown, upon discussion w/ icu attending margot, can admit to regular floor

## 2022-03-04 NOTE — CONSULT NOTE ADULT - ASSESSMENT
Anemia likely due to GI bleed  Severe AS  BRANDON s/p stents    -patient has history of definitve bleed in stomach and AVMs on capsule endoscopy in 2022  -acute management of GI bleed is needed  -cath and possible TAVR potentially in future once active GI bleed, anemia, and EVAN have resolved as above are prohibitive of cath  -keep Hb>8, transfuse as needed  -GI f/u  -care per primary team   Anemia likely due to GI bleed  EVAN on CKD  Severe AS  BRANDON s/p stents      -patient has evidence of definitive bleed in stomach and duodenal AVMs on capsule endoscopy in 2022  -acute management of GI bleed is needed  -cath and possible TAVR potentially in future once active GI bleed, anemia, and EVAN have resolved as above are prohibitive of cath  -keep Hb>8, transfuse as needed  -GI f/u  -c/w lasix 40mg IV BID  -care per primary team

## 2022-03-04 NOTE — H&P ADULT - ATTENDING COMMENTS
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 Associated with + epigastric ABD pain and KANG. - hematemesis, - black/bloody stools. Denies any hematuria, dysuria, rashes, bruising, N/V/D, or fevers/chills. ROS negative, except as stated above.     In the ED, OLEKSANDR was negative. Chest X-Ray w/ B/L perihilar opacities. Hgb was 4.5. T&S obtained and pt transfused 3 units of PRBCs. Started on PPI. GI consulted and spoke w/ family, risks of EGD do not outweigh benefits unless pt is having overt bleeding. Cardio consulted and is asking for IV diuresis.     FMHx: Reviewed, not relevant    Physical exam shows pt in NAD. VSS, afebrile, not hypoxic on RA. Pallor present. Neuro exam intact. B/L lower lung fields w/ fine crackles. RRR, systolic murmur present. ABD with mild TTP in the epigastric region, normoactive BSs. LEs without swelling. No rashes or ecchymosis noted. Labs and radiology as above.     Macrocytic anemia, concern for GIB from known small bowel AVMs. HD stable. Trend CBC. Anemia studies. PPI. Active T&S. Transfuse PRN for Hgb < 7. Two large bore IVs. NPO. GI is following.  -Cardio consulted and is asking for IV diuresis    Hyperkalemia. Treat now. Repeat BMP in the AM.     HX of severe AS, HTN, diverticulosis, erosive gastritis, and CKD4. Restart home meds, except as stated above. DVT PPX. Inform PCP of pt's admission to hospital. My note supersedes the residents note.     Date seen by Attending: 3/4/22

## 2022-03-04 NOTE — CONSULT NOTE ADULT - SUBJECTIVE AND OBJECTIVE BOX
Cardiologist: Munir Paula    HPI:  Location: [facility] ER Hold 020 A ([facility] Hold)  Patient Name: LATRICIA ARREAGA  Age: 76y  Gender: Female      History of Present Illness    Ms. Arreaga is a 76 year old (ex smoker) female patient known to have:  - Severe AS. Last TTE 02/17/22 EF 74%, severe AS with A 0.8cm2 and mean P 48.9,  mild MR  - Anemia Likely Secondary to obscure GI bleeding (from Duodenum and Gastric AVM per recent capsule endoscopy 02/2022 after presenting with melena), Fe Deficiency (TFN S% 5 in 2020 and ferritin 28 in 2022), and CKD per Dr Silver. Received Transfusions q1-2 weeks and Retacrit 65376 units weekly  - Moderate Diverticulosis and Grade II Internal Hemorrhoids on colonoscopy 10/22/2019  - Erosive Gastritis on EGD 01/09/2020  - Hypertension  - CKD. Baseline Cr 1.7-2.2 2022  - Sciatica      She presented to the ED on 03/04 for evaluation of low Hb 4.5 on outpatient labs.  History goes back to yesterday when the patient had labs at [facility].  Hb came back as 4.5 today so Dr Silver asked her to come to ED.  Patient reports episodes of substernal chest discomfort and shortness of breath on minimal exertion associated with worsening leg swelling, orthopneas, and PNDs.  She denies light headedness, palpitations, syncope, falls, or diaphoresis.  She also denies any hematemesis, melena, hematochezia, or hemoptysis. Passing dark brown bowel movements almost on a daily basis (last time admitted with back stools was in 02/2022)      On review of systems, patient denies any recent fever, chills, night sweats, URTI symptoms (cough, rhinorrhea, sore throat), urinary symptoms (urinary frequency, urgency, intermittence, dysuria, foul smelling urine, cloudy urine), abdominal pain, headache, nausea, or vomiting.   No sick contacts.   No recent travel or exposure to recent travelers.      Upon presentation to the ED, the patient was hemodynamically stable:  Vital Signs in ED   - /44 mmHg  - HR 74 bpm  - RR 20 bpm  - T 97 degrees  - SaO2 99% on RA    Investigations   Laboratory Workup  - CBC:                        4.5    4.54  )-----------( 134      ( 04 Mar 2022 12:30 )             15.5      (04 Mar 2022 15:57)      PAST MEDICAL & SURGICAL HISTORY  HTN (hypertension)    Heart murmur    Eczema    Anemia    Aortic stenosis    H/O appendicitis    H/O cholecystitis        FAMILY HISTORY:  FAMILY HISTORY:  FH: kidney disease (Father)      [ ] no pertinent family history of premature cardiovascular disease in first degree relatives.  Mother:   Father:   Siblings:     SOCIAL HISTORY:  []smoker  []Alcohol  []Drug    ALLERGIES:  aspirin (Rash)  latex (Unknown)  penicillins (Unknown)      MEDICATIONS:  MEDICATIONS  (STANDING):  chlorhexidine 4% Liquid 1 Application(s) Topical <User Schedule>  furosemide   Injectable 40 milliGRAM(s) IV Push every 12 hours  metoprolol tartrate 25 milliGRAM(s) Oral two times a day  pantoprazole  Injectable 40 milliGRAM(s) IV Push every 12 hours    MEDICATIONS  (PRN):  acetaminophen     Tablet .. 650 milliGRAM(s) Oral every 6 hours PRN Mild Pain (1 - 3)  aluminum hydroxide/magnesium hydroxide/simethicone Suspension 30 milliLiter(s) Oral every 4 hours PRN Dyspepsia  ondansetron Injectable 4 milliGRAM(s) IV Push every 8 hours PRN Nausea and/or Vomiting      HOME MEDICATIONS:  Home Medications:  aluminum hydroxide-magnesium hydroxide 200 mg-200 mg/5 mL oral suspension: 30 milliliter(s) orally every 4 hours, As needed, Dyspepsia (24 Jan 2022 12:15)  heparin: 5000 unit(s) subcutaneous 3 times a day (24 Jan 2022 12:15)  melatonin 3 mg oral tablet: 1 tab(s) orally once a day (at bedtime), As needed, Insomnia (24 Jan 2022 12:15)  Metoprolol Tartrate 25 mg oral tablet: 1 tab(s) orally 2 times a day (18 Jan 2022 00:11)  pantoprazole 40 mg oral delayed release tablet: 1 tab(s) orally once a day (before a meal) (24 Jan 2022 12:15)      VITALS:   T(F): 96.4 (03-04 @ 17:50), Max: 97 (03-04 @ 10:54)  HR: 74 (03-04 @ 17:50) (74 - 83)  BP: 120/66 (03-04 @ 17:50) (98/48 - 120/66)  BP(mean): --  RR: 18 (03-04 @ 17:50) (18 - 20)  SpO2: 99% (03-04 @ 17:50) (99% - 99%)    I&O's Summary      REVIEW OF SYSTEMS:  CONSTITUTIONAL: No weakness, fevers or chills  EYES: No visual changes  ENT: No vertigo or throat pain   NECK: No pain or stiffness  RESPIRATORY: No cough, wheezing, hemoptysis; No shortness of breath  CARDIOVASCULAR: No chest pain or palpitations  GASTROINTESTINAL: No abdominal or epigastric pain. No nausea, vomiting, or hematemesis; No diarrhea or constipation. No melena or hematochezia.  GENITOURINARY: No dysuria, frequency or hematuria  NEUROLOGICAL: No numbness or weakness  SKIN: No itching, no rashes  MSK: FROM x4    PHYSICAL EXAM:  NEURO: patient is awake , alert and oriented  GEN: Not in acute distress  NECK: no thyroid enlargement, no JVD  LUNGS: Clear to auscultation bilaterally   CARDIOVASCULAR: S1/S2 present, RRR , no murmurs or rubs, no carotid bruits,  + PP bilaterally  ABD: Soft, non-tender, non-distended, +BS  EXT: No NIKOLAI  SKIN: Intact    LABS:                        4.6    3.00  )-----------( 121      ( 04 Mar 2022 16:00 )             16.0     03-04    139  |  106  |  60<H>  ----------------------------<  94  5.3<H>   |  25  |  2.7<H>    Ca    8.3<L>      04 Mar 2022 12:30    TPro  5.7<L>  /  Alb  3.3<L>  /  TBili  0.6  /  DBili  x   /  AST  13  /  ALT  6   /  AlkPhos  66  03-04    PT/INR - ( 04 Mar 2022 12:30 )   PT: 11.70 sec;   INR: 1.02 ratio         PTT - ( 04 Mar 2022 12:30 )  PTT:32.0 sec          Troponin trend:      01-18 Chol 93 LDL -- HDL 38<L> Trig 114      RADIOLOGY:  -CXR:  -TTE:  -CCTA:  -STRESS TEST:  -CATHETERIZATION:    ECG:    TELEMETRY EVENTS:

## 2022-03-04 NOTE — CONSULT NOTE ADULT - ASSESSMENT
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 slaughter. Pt was found to have hb of 4.5 (baseline 8-9) and endorses shortness of breath. Denies any N/V/D,Fever, chills, hematemesis hematochezia or weight loss      #Acute on chronic iron deficiency anemia  #On blood transfusions every week/other week with iron transfusions last one on 2/14  #Severe Aortic Stenosis - Currently fluid overloaded on exam  - No evidence of active GI  bleed   -hemodynamically stable  -HGB 4.5 (baseline 8-9)  -Had EGD 10/2019: gastritis and colonoscopy 10/2019 good prep, pan colonic diverticulosis, hemorrhoids  -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 Paula  -US 4 mm CBD  -Capsule endoscopy (2022): positive for bleeding in the stomach and AVM in duodenum     Recs:   - writer had lengthy discussion with Son Lopez Mccormick  about pts underlying condition. 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 slaughter. Pt was found to have hb of 4.5 (baseline 8-9) and endorses shortness of breath. Denies any N/V/D,Fever, chills, hematemesis hematochezia or weight loss      #Acute on chronic iron deficiency anemia possible component of occult GI bleed likely from small bowel AVMs  #On blood transfusions every week/other week with iron transfusions last one on 2/14  #Severe Aortic Stenosis - Currently fluid overloaded on exam  - No evidence of active GI  bleed   -hemodynamically stable  -HGB 4.5 (baseline 8-9)  -Had EGD 10/2019: gastritis and colonoscopy 10/2019 good prep, pan colonic diverticulosis, hemorrhoids  -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 Paula  -US 4 mm CBD  -Capsule endoscopy (2022): positive for bleeding in the stomach and AVM in duodenum     Recs:   - writer had lengthy discussion with Son Lopez Mccormick  about pts underlying condition. Due to pt's high risk son and pt want to hold off any endoscopic intervention unless overt evidence of bleeding per mouth or rectum w/ hemodynamic instability and perform endoscopy as a life saving intervention  - Target hb >8  - Monitor Cbc qdaily  - protonix 40mg BID  - cardiology eval if pt will be candidate for TAVR  - recall gi if any overt evidence of bleeding

## 2022-03-04 NOTE — ED ADULT NURSE REASSESSMENT NOTE - NS ED NURSE REASSESS COMMENT FT1
patient receiving 1 unit of RPBC.  15 min reassessment, patient tolerating well with no signs of transfusion reaction noted. Will continue to monitor.

## 2022-03-04 NOTE — ED ADULT NURSE REASSESSMENT NOTE - NS ED NURSE REASSESS COMMENT FT1
patient received 1 unit of RPBC, tolerated well with no signs of transfusion reaction noted.  Will continue to monitor.

## 2022-03-04 NOTE — ED ADULT NURSE REASSESSMENT NOTE - NS ED NURSE REASSESS COMMENT FT1
patient received from previous RN Nick. Patient receiving 1 unit of RPBC, tolerating well. Patient offers no complaints. Will continue to monitor./

## 2022-03-04 NOTE — H&P ADULT - NSHPPHYSICALEXAM_GEN_ALL_CORE
- Physical Exam in ED  * General Appearance: Alert, cooperative, interactive, oriented to time, place, and person, in no acute distress  * Head: Normocephalic, without obvious abnormality, atraumatic  * Eyes: PERRL, conjunctiva/corneas clear, EOM's intact, fundi benign, both eyes  * Neck: Supple, symmetrical, trachea midline, no adenopathy;   * Lungs: Respirations unlabored, bilateral air entry, audible crackles bilaterally, no audible wheezes or rhonchi  * Heart: Regular Rate and Rhythm, normal S1 and S2, no audible murmur, rub, or gallop  * Abdomen: Symmetric, distended, soft, non-tender, bowel sounds active all four quadrants, no masses, no organomegaly (no hepatosplenomegaly)  * Rectal: Normal tone, no masses or tenderness; brown stool  * Extremities: +2 lower extremity pitting edema bilaterally, adequate dorsalis pedis pulses  * Pulses: 2+ and symmetric all extremities  * Skin: Skin color, texture, turgor normal, no rashes or lesions  * Neurologic: CNII-XII intact, normal strength, sensation and reflexes throughout

## 2022-03-04 NOTE — H&P ADULT - HISTORY OF PRESENT ILLNESS
Location: [facility] ER Hold 020 A ([facility] ER Hold)  Patient Name: LATRICIA ARREAGA  Age: 76y  Gender: Female      History of Present Illness    Ms. Arreaga is a 76 year old (ex smoker) female patient known to have:  - Severe AS. Last TTE 02/17/22 EF 74%, severe AS with A 0.8cm2 and mean P 48.9,  mild MR  - Anemia Likely Secondary to obscure GI bleeding (from Duodenum and Gastric AVM per recent capsule endoscopy 02/2022 after presenting with melena), Fe Deficiency (TFN S% 5 in 2020 and ferritin 28 in 2022), and CKD per Dr Silver. Received Transfusions q1-2 weeks and Retacrit 12958 units weekly  - Moderate Diverticulosis and Grade II Internal Hemorrhoids on colonoscopy 10/22/2019  - Erosive Gastritis on EGD 01/09/2020  - Hypertension  - CKD. Baseline Cr 1.7-2.2 2022  - Sciatica      She presented to the ED on 03/04 for evaluation of low Hb 4.5 on outpatient labs.  History goes back to yesterday when the patient had labs at [facility].  Hb came back as 4.5 today so Dr Silver asked her to come to ED.  Patient reports episodes of substernal chest discomfort and shortness of breath on minimal exertion associated with worsening leg swelling, orthopneas, and PNDs.  She denies light headedness, palpitations, syncope, falls, or diaphoresis.  She also denies any hematemesis, melena, hematochezia, or hemoptysis. Passing dark brown bowel movements almost on a daily basis (last time admitted with back stools was in 02/2022)      On review of systems, patient denies any recent fever, chills, night sweats, URTI symptoms (cough, rhinorrhea, sore throat), urinary symptoms (urinary frequency, urgency, intermittence, dysuria, foul smelling urine, cloudy urine), abdominal pain, headache, nausea, or vomiting.   No sick contacts.   No recent travel or exposure to recent travelers.      Upon presentation to the ED, the patient was hemodynamically stable:  Vital Signs in ED   - /44 mmHg  - HR 74 bpm  - RR 20 bpm  - T 97 degrees  - SaO2 99% on RA    Investigations   Laboratory Workup  - CBC:                        4.5    4.54  )-----------( 134      ( 04 Mar 2022 12:30 )             15.5     - Chemistry:  03-04    139  |  106  |  60<H>  ----------------------------<  94  5.3<H>   |  25  |  2.7<H>    Ca    8.3<L>      04 Mar 2022 12:30    TPro  5.7<L>  /  Alb  3.3<L>  /  TBili  0.6  /  DBili  x   /  AST  13  /  ALT  6   /  AlkPhos  66  03-04    - Coagulation Studies:  PT/INR - ( 04 Mar 2022 12:30 )   PT: 11.70 sec;   INR: 1.02 ratio    PTT - ( 04 Mar 2022 12:30 )  PTT:32.0 sec      Microbiological Workup      Radiological Workup  * CXR with Bilateral perihilar and lower lobe opacities and effusions, left greater than right.      - GI team evaluated patient in the ED  - OLEKSANDR with brown stool, no bright red blood  - 3 units of packed RBCs were ordered for patient  - Stat IV lasix 40mg x1 dose was administered (not more since BP on low side and patient has severe AS)  - Patient will be admitted for further investigations, management, and monitoring

## 2022-03-04 NOTE — ED PROVIDER NOTE - CLINICAL SUMMARY MEDICAL DECISION MAKING FREE TEXT BOX
76 y F with PMH of severe aortic stenosis, transfusion-dependent anemia on Retacrit 2000 U weekly Presents from her hematologist office for significant anemia.  Patient with history of anemia likely due from combination of renal disease as well as slow GI bleed.  Denies any melena or blood in stool.  States that she feels fatigued.  Outpatient labs with anemia.  On exam nontoxic, vital signs stable, heart regular with old systolic murmur, lungs clear bilaterally.  Abdomen benign.  Baseline 1+ bilateral lower extremity pitting edema, and mild right-sided redness that she states is chronic.  No warmth or tenderness.  Chest x-ray with left sided greater than right opacity, denies any fever or cough.  No respiratory distress.  Labs with significant anemia, given possibility of slow GI bleed given PPI, GI consulted, admitted to medicine.  EKG at baseline.

## 2022-03-05 LAB
ALBUMIN SERPL ELPH-MCNC: 3.3 G/DL — LOW (ref 3.5–5.2)
ALP SERPL-CCNC: 66 U/L — SIGNIFICANT CHANGE UP (ref 30–115)
ALT FLD-CCNC: 6 U/L — SIGNIFICANT CHANGE UP (ref 0–41)
ANION GAP SERPL CALC-SCNC: 16 MMOL/L — HIGH (ref 7–14)
AST SERPL-CCNC: 16 U/L — SIGNIFICANT CHANGE UP (ref 0–41)
BASOPHILS # BLD AUTO: 0.01 K/UL — SIGNIFICANT CHANGE UP (ref 0–0.2)
BASOPHILS NFR BLD AUTO: 0.2 % — SIGNIFICANT CHANGE UP (ref 0–1)
BILIRUB SERPL-MCNC: 1.4 MG/DL — HIGH (ref 0.2–1.2)
BUN SERPL-MCNC: 69 MG/DL — CRITICAL HIGH (ref 10–20)
CALCIUM SERPL-MCNC: 8.4 MG/DL — LOW (ref 8.5–10.1)
CHLORIDE SERPL-SCNC: 110 MMOL/L — SIGNIFICANT CHANGE UP (ref 98–110)
CO2 SERPL-SCNC: 18 MMOL/L — SIGNIFICANT CHANGE UP (ref 17–32)
CREAT SERPL-MCNC: 3.1 MG/DL — HIGH (ref 0.7–1.5)
EGFR: 15 ML/MIN/1.73M2 — LOW
EOSINOPHIL # BLD AUTO: 0.01 K/UL — SIGNIFICANT CHANGE UP (ref 0–0.7)
EOSINOPHIL NFR BLD AUTO: 0.2 % — SIGNIFICANT CHANGE UP (ref 0–8)
GLUCOSE SERPL-MCNC: 94 MG/DL — SIGNIFICANT CHANGE UP (ref 70–99)
HCT VFR BLD CALC: 15.6 %
HCT VFR BLD CALC: 23.5 % — LOW (ref 37–47)
HCT VFR BLD CALC: 23.8 % — LOW (ref 37–47)
HCT VFR BLD CALC: 25.2 % — LOW (ref 37–47)
HGB BLD-MCNC: 4.7 G/DL
HGB BLD-MCNC: 7.4 G/DL — LOW (ref 12–16)
HGB BLD-MCNC: 7.5 G/DL — LOW (ref 12–16)
HGB BLD-MCNC: 8 G/DL — LOW (ref 12–16)
IMM GRANULOCYTES NFR BLD AUTO: 0.6 % — HIGH (ref 0.1–0.3)
LDH SERPL L TO P-CCNC: 268 — HIGH (ref 50–242)
LYMPHOCYTES # BLD AUTO: 0.54 K/UL — LOW (ref 1.2–3.4)
LYMPHOCYTES # BLD AUTO: 10.3 % — LOW (ref 20.5–51.1)
MAGNESIUM SERPL-MCNC: 2.3 MG/DL — SIGNIFICANT CHANGE UP (ref 1.8–2.4)
MCHC RBC-ENTMCNC: 28.3 PG — SIGNIFICANT CHANGE UP (ref 27–31)
MCHC RBC-ENTMCNC: 28.7 PG — SIGNIFICANT CHANGE UP (ref 27–31)
MCHC RBC-ENTMCNC: 28.8 PG — SIGNIFICANT CHANGE UP (ref 27–31)
MCHC RBC-ENTMCNC: 30.1 G/DL
MCHC RBC-ENTMCNC: 30.1 PG
MCHC RBC-ENTMCNC: 31.1 G/DL — LOW (ref 32–37)
MCHC RBC-ENTMCNC: 31.7 G/DL — LOW (ref 32–37)
MCHC RBC-ENTMCNC: 31.9 G/DL — LOW (ref 32–37)
MCV RBC AUTO: 100 FL
MCV RBC AUTO: 89 FL — SIGNIFICANT CHANGE UP (ref 81–99)
MCV RBC AUTO: 90.4 FL — SIGNIFICANT CHANGE UP (ref 81–99)
MCV RBC AUTO: 92.2 FL — SIGNIFICANT CHANGE UP (ref 81–99)
MONOCYTES # BLD AUTO: 0.29 K/UL — SIGNIFICANT CHANGE UP (ref 0.1–0.6)
MONOCYTES NFR BLD AUTO: 5.5 % — SIGNIFICANT CHANGE UP (ref 1.7–9.3)
NEUTROPHILS # BLD AUTO: 4.35 K/UL — SIGNIFICANT CHANGE UP (ref 1.4–6.5)
NEUTROPHILS NFR BLD AUTO: 83.2 % — HIGH (ref 42.2–75.2)
NRBC # BLD: 0 /100 WBCS — SIGNIFICANT CHANGE UP (ref 0–0)
NT-PROBNP SERPL-SCNC: 3116 PG/ML — HIGH (ref 0–300)
PLATELET # BLD AUTO: 106 K/UL
PLATELET # BLD AUTO: 124 K/UL — LOW (ref 130–400)
PLATELET # BLD AUTO: 145 K/UL — SIGNIFICANT CHANGE UP (ref 130–400)
PLATELET # BLD AUTO: 147 K/UL — SIGNIFICANT CHANGE UP (ref 130–400)
PMV BLD: 11.1 FL
POTASSIUM SERPL-MCNC: 5.5 MMOL/L — HIGH (ref 3.5–5)
POTASSIUM SERPL-SCNC: 5.5 MMOL/L — HIGH (ref 3.5–5)
PROT SERPL-MCNC: 5.7 G/DL — LOW (ref 6–8)
RBC # BLD: 1.56 M/UL
RBC # BLD: 2.58 M/UL — LOW (ref 4.2–5.4)
RBC # BLD: 2.6 M/UL — LOW (ref 4.2–5.4)
RBC # BLD: 2.6 M/UL — LOW (ref 4.2–5.4)
RBC # BLD: 2.83 M/UL — LOW (ref 4.2–5.4)
RBC # FLD: 17.9 % — HIGH (ref 11.5–14.5)
RBC # FLD: 18.1 %
RBC # FLD: 19 % — HIGH (ref 11.5–14.5)
RBC # FLD: 19.9 % — HIGH (ref 11.5–14.5)
RETICS #: 142.7 K/UL — HIGH (ref 25–125)
RETICS/RBC NFR: 5.5 % — HIGH (ref 0.5–1.5)
SODIUM SERPL-SCNC: 144 MMOL/L — SIGNIFICANT CHANGE UP (ref 135–146)
WBC # BLD: 3.36 K/UL — LOW (ref 4.8–10.8)
WBC # BLD: 5.23 K/UL — SIGNIFICANT CHANGE UP (ref 4.8–10.8)
WBC # BLD: 5.65 K/UL — SIGNIFICANT CHANGE UP (ref 4.8–10.8)
WBC # FLD AUTO: 3.36 K/UL — LOW (ref 4.8–10.8)
WBC # FLD AUTO: 4.28 K/UL
WBC # FLD AUTO: 5.23 K/UL — SIGNIFICANT CHANGE UP (ref 4.8–10.8)
WBC # FLD AUTO: 5.65 K/UL — SIGNIFICANT CHANGE UP (ref 4.8–10.8)

## 2022-03-05 PROCEDURE — 99233 SBSQ HOSP IP/OBS HIGH 50: CPT

## 2022-03-05 PROCEDURE — 99221 1ST HOSP IP/OBS SF/LOW 40: CPT | Mod: GC

## 2022-03-05 PROCEDURE — 93970 EXTREMITY STUDY: CPT | Mod: 26

## 2022-03-05 PROCEDURE — 71045 X-RAY EXAM CHEST 1 VIEW: CPT | Mod: 26

## 2022-03-05 PROCEDURE — 99223 1ST HOSP IP/OBS HIGH 75: CPT

## 2022-03-05 RX ORDER — DIPHENHYDRAMINE HCL 50 MG
25 CAPSULE ORAL EVERY 4 HOURS
Refills: 0 | Status: DISCONTINUED | OUTPATIENT
Start: 2022-03-05 | End: 2022-03-16

## 2022-03-05 RX ADMIN — Medication 25 MILLIGRAM(S): at 05:55

## 2022-03-05 RX ADMIN — Medication 40 MILLIGRAM(S): at 17:16

## 2022-03-05 RX ADMIN — PANTOPRAZOLE SODIUM 40 MILLIGRAM(S): 20 TABLET, DELAYED RELEASE ORAL at 05:57

## 2022-03-05 RX ADMIN — Medication 25 MILLIGRAM(S): at 17:16

## 2022-03-05 RX ADMIN — Medication 25 MILLIGRAM(S): at 19:36

## 2022-03-05 RX ADMIN — PANTOPRAZOLE SODIUM 40 MILLIGRAM(S): 20 TABLET, DELAYED RELEASE ORAL at 17:17

## 2022-03-05 RX ADMIN — Medication 40 MILLIGRAM(S): at 05:57

## 2022-03-05 RX ADMIN — CHLORHEXIDINE GLUCONATE 1 APPLICATION(S): 213 SOLUTION TOPICAL at 05:55

## 2022-03-05 NOTE — PROGRESS NOTE ADULT - ASSESSMENT
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 In the ED, she was hemodynamically stable with a Hgb of 4.5 and received 3 units of PRBC. Workup also showed acute CHF and she was placed on IV diuresis.    1. Anemia - acute over chronic   likely due to recurrent GI bleed in setting of CKD and iron deficiency anemia  had GI workup recently including capsule endoscopy 2/22 that showed duodenum and gastric AVMs  EGD/colonoscopy: erosive gastritis, moderate diverticulosis, grade 2 internal hemorrhoids  this admission: OLEKSANDR with brown stool and Hgb 4.5  s/p 3 units PRBC -> Hgb 7.5  goal Hgb >8 so another unit transfused today  check CBC at 8PM and in AM  active T&S  PPI IV q12hr  on clear liquid diet   GI and HemOnc evals appreciated  per GI consult, pt and son want to hold off on endoscopic intervention unless active bleeding with hemodynamic instability  repeat iron studies  s/p venofer   on blood transfusions q1-2 weeks and weekly retacrit 70983 units    2. Acute HFpEF / severe AS  continue lasix 40mg IV q12hr for volume overload  daily wts and I's and O's, fluid restriction 1.5L/day  monitor BMP, Mg  cardio consult appreciated: cath and possible TAVR potentially in future once active GI bleed, anemia, and EVAN have resolved     3. EVAN over CKD 4 vs progressive CKD  Baseline Cr 1.7-2.2 in early 2022  Recent admission with Cr 2.7-2.9   no hydronephrosis on US  ?CRS - continue diuresis for volume overload and monitor   daily BMP    4. HTN on metoprolol - BP acceptable    5. Hyperkalemia - hemolyzed sample today  monitor BMP  lokelma if needed for K 5.5 or higher    6. Morbid obesity BMI 46.3    7. DVT prophylaxis - SCD  venous duplex of LE negative for DVT    8. Thrombocytopenia - improving  w/up per HemOnc      PROGRESS NOTE HANDOFF    Pending: labs at 8PM and in AM, PT consult when more stable, improved volume status    pt aware of the plan of care and she states she will update her family    Disposition: [facility]                              Others  - DVT Prophylaxis: off. Check VA duplex venous LE  - GI Prophylaxis as detailed before  - Diet: NPO  - Code Status: Full       75 y/o woman with PMH of severe AS, HTN, CKD 4, sciatica and anemia recently found to have evidence of bleeding from gastric AVM and duodenum on capsule endoscopy and currently on q1-2 weekly transfusions and retacrit weekly now presented to the ED on 03/04 for evaluation of low hemoglobin on outpatient labs. In the ED, she was hemodynamically stable with a Hgb of 4.5 and received 3 units of PRBC. Workup also showed acute CHF and she was placed on IV diuresis.    1. Anemia - acute over chronic   likely due to recurrent GI bleed in setting of CKD and iron deficiency anemia  had GI workup recently including capsule endoscopy 2/22 that showed duodenum and gastric AVMs  EGD/colonoscopy: erosive gastritis, moderate diverticulosis, grade 2 internal hemorrhoids  this admission: OLEKSANDR with brown stool and Hgb 4.5  s/p 3 units PRBC -> Hgb 7.5  goal Hgb >8 so another unit transfused today  check CBC at 8PM and in AM  active T&S  PPI IV q12hr  on clear liquid diet   GI and HemOnc evals appreciated  per GI consult, pt and son want to hold off on endoscopic intervention unless active bleeding with hemodynamic instability  repeat iron studies  s/p venofer   on blood transfusions q1-2 weeks and weekly retacrit 86958 units    2. Acute HFpEF / severe AS  continue lasix 40mg IV q12hr for volume overload  daily wts and I's and O's, fluid restriction 1.5L/day  monitor BMP, Mg  cardio consult appreciated: cath and possible TAVR potentially in future once active GI bleed, anemia, and EVAN have resolved     3. EVAN over CKD 4 vs progressive CKD  Baseline Cr 1.7-2.2 in early 2022  Recent admission with Cr 2.7-2.9   no hydronephrosis on US  ?CRS - continue diuresis for volume overload and monitor   daily BMP    4. HTN on metoprolol - BP acceptable    5. Hyperkalemia - hemolyzed sample today  monitor BMP  lokelma if needed for K 5.5 or higher    6. Morbid obesity BMI 46.3    7. DVT prophylaxis - SCD  venous duplex of LE negative for DVT    8. Thrombocytopenia - improving  w/up per HemOnc      PROGRESS NOTE HANDOFF    Pending: labs at 8PM and in AM, PT consult when more stable, improved volume status    pt aware of the plan of care and she states she will update her family    Disposition: [facility]

## 2022-03-05 NOTE — PATIENT PROFILE ADULT - FALL HARM RISK - HARM RISK INTERVENTIONS
Assistance with ambulation/Assistance OOB with selected safe patient handling equipment/Communicate Risk of Fall with Harm to all staff/Discuss with provider need for PT consult/Monitor gait and stability/Provide patient with walking aids - walker, cane, crutches/Reinforce activity limits and safety measures with patient and family/Sit up slowly, dangle for a short time, stand at bedside before walking/Tailored Fall Risk Interventions/Visual Cue: Yellow wristband and red socks/Bed in lowest position, wheels locked, appropriate side rails in place/Call bell, personal items and telephone in reach/Instruct patient to call for assistance before getting out of bed or chair/Non-slip footwear when patient is out of bed/[place] to call system/Physically safe environment - no spills, clutter or unnecessary equipment/Purposeful Proactive Rounding/Room/bathroom lighting operational, light cord in reach

## 2022-03-05 NOTE — CONSULT NOTE ADULT - SUBJECTIVE AND OBJECTIVE BOX
Ms. Marroquin is a 76 year old (ex smoker) female patient known to have severe AS (TTE 02/17/22 EF 74%, severe AS with A 0.8cm2 and mean P 48.9,  mild MR), anemia Likely Secondary to obscure GI bleeding (from Duodenum and Gastric AVM per recent capsule endoscopy 02/2022 after presenting with melena), Fe Deficiency (TFN S% 5 in 2020 and ferritin 28 in 2022), and CKD (baseline 1.7-2.2) , gets transfusions every 1-2 weeks and Retacrit 49832 units (last dose 2/15/22, erosive gastritis presents for hemoglobin 4.5 g/dL on outpatient labs.    Patient received 3 units of PRBCs with an appropriate repsonse to 7.4. Seen by GI, holding off on endoscopy due to her severe AS. Cardiology consulted for TAVR, holding off due to her GI bleed and EVAN/CKD. Received venfoer x4 in January. On retacrit 20K units, weekly to every 3 weeks    - Chemistry:  03-04    139  |  106  |  60<H>  ----------------------------<  94  5.3<H>   |  25  |  2.7<H>    Ca    8.3<L>      04 Mar 2022 12:30    TPro  5.7<L>  /  Alb  3.3<L>  /  TBili  0.6  /  DBili  x   /  AST  13  /  ALT  6   /  AlkPhos  66  03-04    - Coagulation Studies:  PT/INR - ( 04 Mar 2022 12:30 )   PT: 11.70 sec;   INR: 1.02 ratio    PTT - ( 04 Mar 2022 12:30 )  PTT:32.0 sec      Microbiological Workup      Radiological Workup  * CXR with Bilateral perihilar and lower lobe opacities and effusions, left greater than right.      - GI team evaluated patient in the ED  - OLEKSANDR with brown stool, no bright red blood  - 3 units of packed RBCs were ordered for patient  - Stat IV lasix 40mg x1 dose was administered (not more since BP on low side and patient has severe AS)  - Patient will be admitted for further investigations, management, and monitoring           (04 Mar 2022 15:57)       ROS negative except for the above.     PAST MEDICAL & SURGICAL HISTORY:  HTN (hypertension)    Heart murmur    Eczema    Anemia    Aortic stenosis    H/O appendicitis    H/O cholecystitis        SOCIAL HISTORY:    FAMILY HISTORY:  FH: kidney disease (Father)        MEDICATIONS  (STANDING):  chlorhexidine 4% Liquid 1 Application(s) Topical <User Schedule>  furosemide   Injectable 40 milliGRAM(s) IV Push every 12 hours  metoprolol tartrate 25 milliGRAM(s) Oral two times a day  pantoprazole  Injectable 40 milliGRAM(s) IV Push every 12 hours    MEDICATIONS  (PRN):  acetaminophen     Tablet .. 650 milliGRAM(s) Oral every 6 hours PRN Mild Pain (1 - 3)  aluminum hydroxide/magnesium hydroxide/simethicone Suspension 30 milliLiter(s) Oral every 4 hours PRN Dyspepsia  ondansetron Injectable 4 milliGRAM(s) IV Push every 8 hours PRN Nausea and/or Vomiting    Height (cm): 170.2 (03-04-22 @ 10:54)  Weight (kg): 126.3 (03-05-22 @ 03:15)  BMI (kg/m2): 43.6 (03-05-22 @ 03:15)  BSA (m2): 2.33 (03-05-22 @ 03:15)  Allergies    aspirin (Rash)  latex (Unknown)  penicillins (Unknown)    Intolerances        Vital Signs Last 24 Hrs  T(C): 35.9 (05 Mar 2022 03:15), Max: 36.3 (04 Mar 2022 22:20)  T(F): 96.6 (05 Mar 2022 03:15), Max: 97.4 (04 Mar 2022 22:20)  HR: 78 (05 Mar 2022 03:15) (73 - 83)  BP: 134/60 (05 Mar 2022 03:15) (98/48 - 136/59)  BP(mean): --  RR: 18 (05 Mar 2022 03:15) (18 - 20)  SpO2: 98% (05 Mar 2022 01:00) (98% - 99%)    PHYSICAL EXAM  General: NAD  HEENT: NCAT, EOMI  Neck: supple  CV: Regular rate and rhythm  Lungs: Clear to ascultation bilaterally, no respiratory distress  Abdomen: soft non-tender non-distended  Ext: No edema  Skin: no rashes and no petechiae  Neuro: alert and oriented, no focal deficits      LABS:                          7.4    3.36  )-----------( 124      ( 05 Mar 2022 01:00 )             23.8         Mean Cell Volume : 92.2 fL  Mean Cell Hemoglobin : 28.7 pg  Mean Cell Hemoglobin Concentration : 31.1 g/dL  Auto Neutrophil # : x  Auto Lymphocyte # : x  Auto Monocyte # : x  Auto Eosinophil # : x  Auto Basophil # : x  Auto Neutrophil % : x  Auto Lymphocyte % : x  Auto Monocyte % : x  Auto Eosinophil % : x  Auto Basophil % : x      Serial CBC's  03-05 @ 01:00  Hct-23.8 / Hgb-7.4 / Plat-124 / RBC-2.58 / WBC-3.36  Serial CBC's  03-04 @ 16:00  Hct-16.0 / Hgb-4.6 / Plat-121 / RBC-1.59 / WBC-3.00  Serial CBC's  03-04 @ 12:30  Hct-15.5 / Hgb-4.5 / Plat-134 / RBC-1.55 / WBC-4.54      03-04    139  |  106  |  60<H>  ----------------------------<  94  5.3<H>   |  25  |  2.7<H>    Ca    8.3<L>      04 Mar 2022 12:30    TPro  5.7<L>  /  Alb  3.3<L>  /  TBili  0.6  /  DBili  x   /  AST  13  /  ALT  6   /  AlkPhos  66  03-04      PT/INR - ( 04 Mar 2022 12:30 )   PT: 11.70 sec;   INR: 1.02 ratio         PTT - ( 04 Mar 2022 12:30 )  PTT:32.0 sec                BLOOD SMEAR INTERPRETATION:       RADIOLOGY & ADDITIONAL STUDIES:     Ms. Marroquin is a 76 year old (ex smoker) female patient known to have severe AS (TTE 02/17/22 EF 74%, severe AS with A 0.8cm2 and mean P 48.9,  mild MR), anemia Likely Secondary to obscure GI bleeding (from Duodenum and Gastric AVM per recent capsule endoscopy 02/2022 after presenting with melena), Fe Deficiency (TFN S% 5 in 2020 and ferritin 28 in 2022), and CKD (baseline 1.7-2.2) , gets transfusions every 1-2 weeks and Retacrit 37953 units (last dose 2/15/22  presents for hemoglobin 4.5 g/dL on outpatient labs.    Patient received 3 units of PRBCs with an appropriate repsonse to 7.4. Seen by GI, holding off on endoscopy due to her severe AS. Cardiology consulted for TAVR, holding off due to her GI bleed and EVAN/CKD. Received venfoer x4 in January. On retacrit 20K units, weekly to every 3 weeks    - Chemistry:  03-04    139  |  106  |  60<H>  ----------------------------<  94  5.3<H>   |  25  |  2.7<H>    Ca    8.3<L>      04 Mar 2022 12:30    TPro  5.7<L>  /  Alb  3.3<L>  /  TBili  0.6  /  DBili  x   /  AST  13  /  ALT  6   /  AlkPhos  66  03-04    - Coagulation Studies:  PT/INR - ( 04 Mar 2022 12:30 )   PT: 11.70 sec;   INR: 1.02 ratio    PTT - ( 04 Mar 2022 12:30 )  PTT:32.0 sec      Microbiological Workup      Radiological Workup  * CXR with Bilateral perihilar and lower lobe opacities and effusions, left greater than right.      - GI team evaluated patient in the ED  - OLEKSANDR with brown stool, no bright red blood  - 3 units of packed RBCs were ordered for patient  - Stat IV lasix 40mg x1 dose was administered (not more since BP on low side and patient has severe AS)  - Patient will be admitted for further investigations, management, and monitoring           (04 Mar 2022 15:57)       ROS negative except for the above.     PAST MEDICAL & SURGICAL HISTORY:  HTN (hypertension)    Heart murmur    Eczema    Anemia    Aortic stenosis    H/O appendicitis    H/O cholecystitis        SOCIAL HISTORY:    FAMILY HISTORY:  FH: kidney disease (Father)        MEDICATIONS  (STANDING):  chlorhexidine 4% Liquid 1 Application(s) Topical <User Schedule>  furosemide   Injectable 40 milliGRAM(s) IV Push every 12 hours  metoprolol tartrate 25 milliGRAM(s) Oral two times a day  pantoprazole  Injectable 40 milliGRAM(s) IV Push every 12 hours    MEDICATIONS  (PRN):  acetaminophen     Tablet .. 650 milliGRAM(s) Oral every 6 hours PRN Mild Pain (1 - 3)  aluminum hydroxide/magnesium hydroxide/simethicone Suspension 30 milliLiter(s) Oral every 4 hours PRN Dyspepsia  ondansetron Injectable 4 milliGRAM(s) IV Push every 8 hours PRN Nausea and/or Vomiting    Height (cm): 170.2 (03-04-22 @ 10:54)  Weight (kg): 126.3 (03-05-22 @ 03:15)  BMI (kg/m2): 43.6 (03-05-22 @ 03:15)  BSA (m2): 2.33 (03-05-22 @ 03:15)  Allergies    aspirin (Rash)  latex (Unknown)  penicillins (Unknown)    Intolerances        Vital Signs Last 24 Hrs  T(C): 35.9 (05 Mar 2022 03:15), Max: 36.3 (04 Mar 2022 22:20)  T(F): 96.6 (05 Mar 2022 03:15), Max: 97.4 (04 Mar 2022 22:20)  HR: 78 (05 Mar 2022 03:15) (73 - 83)  BP: 134/60 (05 Mar 2022 03:15) (98/48 - 136/59)  BP(mean): --  RR: 18 (05 Mar 2022 03:15) (18 - 20)  SpO2: 98% (05 Mar 2022 01:00) (98% - 99%)    PHYSICAL EXAM  General: NAD  HEENT: NCAT, EOMI  Neck: supple  CV: Regular rate and rhythm  Lungs: Clear to ascultation bilaterally, no respiratory distress  Abdomen: soft non-tender non-distended  Ext: bilateral edema, R > L   Skin: no rashes and no petechiae  Neuro: alert and oriented, no focal deficits      LABS:                          7.4    3.36  )-----------( 124      ( 05 Mar 2022 01:00 )             23.8         Mean Cell Volume : 92.2 fL  Mean Cell Hemoglobin : 28.7 pg  Mean Cell Hemoglobin Concentration : 31.1 g/dL  Auto Neutrophil # : x  Auto Lymphocyte # : x  Auto Monocyte # : x  Auto Eosinophil # : x  Auto Basophil # : x  Auto Neutrophil % : x  Auto Lymphocyte % : x  Auto Monocyte % : x  Auto Eosinophil % : x  Auto Basophil % : x      Serial CBC's  03-05 @ 01:00  Hct-23.8 / Hgb-7.4 / Plat-124 / RBC-2.58 / WBC-3.36  Serial CBC's  03-04 @ 16:00  Hct-16.0 / Hgb-4.6 / Plat-121 / RBC-1.59 / WBC-3.00  Serial CBC's  03-04 @ 12:30  Hct-15.5 / Hgb-4.5 / Plat-134 / RBC-1.55 / WBC-4.54      03-04    139  |  106  |  60<H>  ----------------------------<  94  5.3<H>   |  25  |  2.7<H>    Ca    8.3<L>      04 Mar 2022 12:30    TPro  5.7<L>  /  Alb  3.3<L>  /  TBili  0.6  /  DBili  x   /  AST  13  /  ALT  6   /  AlkPhos  66  03-04      PT/INR - ( 04 Mar 2022 12:30 )   PT: 11.70 sec;   INR: 1.02 ratio         PTT - ( 04 Mar 2022 12:30 )  PTT:32.0 sec                BLOOD SMEAR INTERPRETATION:       RADIOLOGY & ADDITIONAL STUDIES:

## 2022-03-05 NOTE — PROGRESS NOTE ADULT - SUBJECTIVE AND OBJECTIVE BOX
LATRICIA ARREAGA  76y Female    INTERVAL HPI/OVERNIGHT EVENTS:    Pt seen this morning  she felt better and wanted ice chips (now on clear liquid diet)  denies worsening SOB, chest pain, N/V, abdominal pain  no bleeding per staff    T(F): 96.9 (03-05-22 @ 08:00), Max: 97.4 (03-04-22 @ 22:20)  HR: 65 (03-05-22 @ 08:00) (65 - 83)  BP: 126/49 (03-05-22 @ 08:00) (114/56 - 136/59)  RR: 18 (03-05-22 @ 08:00) (18 - 18)  SpO2: 98% (03-05-22 @ 08:00) (98% - 98%) on 3L NC      I&O's Summary    05 Mar 2022 07:01  -  05 Mar 2022 20:21  --------------------------------------------------------  IN: 0 mL / OUT: 450 mL / NET: -450 mL      PHYSICAL EXAM:  GENERAL: NAD  HEAD:  Normocephalic  EYES:  conjunctiva and sclera clear  ENMT: Moist mucous membranes  NERVOUS SYSTEM:  Alert, awake, Good concentration  CHEST/LUNG: crackles at bases  HEART: Regular rate and rhythm; 3/6 KATHYA  ABDOMEN: Soft, Nontender, Nondistended; Bowel sounds present  EXTREMITIES:  right LE edema > left LE edema (chronic per pt)    Consultant(s) Notes Reviewed:  [x ] YES  [ ] NO  Care Discussed with Consultants/Other Providers [ x] YES  [ ] NO    MEDICATIONS  (STANDING):  chlorhexidine 4% Liquid 1 Application(s) Topical <User Schedule>  furosemide   Injectable 40 milliGRAM(s) IV Push every 12 hours  metoprolol tartrate 25 milliGRAM(s) Oral two times a day  pantoprazole  Injectable 40 milliGRAM(s) IV Push every 12 hours    MEDICATIONS  (PRN):  acetaminophen     Tablet .. 650 milliGRAM(s) Oral every 6 hours PRN Mild Pain (1 - 3)  aluminum hydroxide/magnesium hydroxide/simethicone Suspension 30 milliLiter(s) Oral every 4 hours PRN Dyspepsia  diphenhydrAMINE 25 milliGRAM(s) Oral every 4 hours PRN Rash and/or Itching  ondansetron Injectable 4 milliGRAM(s) IV Push every 8 hours PRN Nausea and/or Vomiting      LABS:                        7.5    5.23  )-----------( 147      ( 05 Mar 2022 11:09 )             23.5     03-05    144  |  110  |  69<HH>  ----------------------------<  94  5.5<H>   |  18  |  3.1<H>    Ca    8.4<L>      05 Mar 2022 04:30  Mg     2.3     03-05    TPro  5.7<L>  /  Alb  3.3<L>  /  TBili  1.4<H>  /  DBili  x   /  AST  16  /  ALT  6   /  AlkPhos  66  03-05    PT/INR - ( 04 Mar 2022 12:30 )   PT: 11.70 sec;   INR: 1.02 ratio         PTT - ( 04 Mar 2022 12:30 )  PTT:32.0 sec      RADIOLOGY & ADDITIONAL TESTS:    Imaging or report Personally Reviewed:  [x ] YES  [ ] NO    < from: TTE Echo Complete w/o Contrast w/ Doppler (02.17.22 @ 12:23) >  Summary:   1. Normal global left ventricular systolic function.   2. LV Ejection Fraction by Brown's Method with a biplane EF of 74 %.   3. Normal left ventricular internal cavity size.   4. Mild mitral valve regurgitation.   5. Severe aortic valve stenosis.   6. Estimated pulmonary artery systolic pressure is 44.5 mmHg assuming a   right atrial pressure of 15 mmHg, which is consistent with mild pulmonary   hypertension.   7. Pulmonary hypertension is present.   8. Mitral valve mean gradient is 4.6 mmHg consistent with mild mitral   stenosis.   9. Peak transaortic gradient equals 86.1 mmHg, mean transaortic gradient   equals 48.9 mmHg, the calculated aortic valve area equals 0.80 cm² by the   continuity equation consistent with severe aortic stenosis.    < end of copied text >  < from: VA Duplex Lower Ext Vein Scan, Bilat (03.05.22 @ 11:04) >  Impression:    No evidence of deep venous thrombosis or superficial thrombophlebitis in   the bilateral lower extremities.    Peroneal vein is not visualized on the right side    < end of copied text >  < from: Xray Chest 1 View- PORTABLE-Urgent (Xray Chest 1 View- PORTABLE-Urgent .) (03.04.22 @ 11:33) >  IMPRESSION:    Bilateral perihilar and lower lobe opacities and effusions, left greater   than right.    < end of copied text >  < from: US Kidney and Bladder (02.19.22 @ 00:34) >  IMPRESSION:      No right hydronephrosis.    Left kidney is not visualized.    Limited evaluation of decompressed urinary bladder.    < end of copied text >      Case discussed with resident on call and RN today    Care discussed with pt

## 2022-03-05 NOTE — ED ADULT NURSE REASSESSMENT NOTE - NS ED NURSE REASSESS COMMENT FT1
patient completed 1 unit RPBC transfusion with no signs of transfusion reaction noted. Will continue to monitor.

## 2022-03-05 NOTE — CONSULT NOTE ADULT - ASSESSMENT
Ms. Marroquin is a 76 year old (ex smoker) female patient known to have severe AS (TTE 02/17/22 EF 74%, severe AS with A 0.8cm2 and mean P 48.9,  mild MR), anemia Likely Secondary to obscure GI bleeding (from Duodenum and Gastric AVM per recent capsule endoscopy 02/2022 after presenting with melena), Fe Deficiency (TFN S% 5 in 2020 and ferritin 28 in 2022), and CKD (baseline 1.7-2.2) , gets transfusions every 1-2 weeks and Retacrit 76290 units weekly, erosive gastritis presents for hemoglobin 4.5 g/dL on outpatient labs.    Chronic anemia: Multifactorial included GI bleed and CKD  - Continue retacrit 20K (last dose 2/15/22  - Received venofer 200mg x3  in January  - Per cardio and GI, keep hg > 8 g/dL    Thrombovytopenia: Intermittent since 2019, possibly from consumption  - MOnitro CBC   Ms. Marroquin is a 76 year old (ex smoker) female patient known to have severe AS (TTE 02/17/22 EF 74%, severe AS with A 0.8cm2 and mean P 48.9,  mild MR), anemia Likely Secondary to obscure GI bleeding (from Duodenum and Gastric AVM per recent capsule endoscopy 02/2022 after presenting with melena), Fe Deficiency (TFN S% 5 in 2020 and ferritin 28 in 2022), and CKD (baseline 1.7-2.2) , gets transfusions every 1-2 weeks and Retacrit 69524 units weekly, erosive gastritis presents for hemoglobin 4.5 g/dL on outpatient labs.    Chronic anemia: Multifactorial included GI bleed and CKD, macrocytic with normal B12, folate, MDS possible  - Continue retacrit 20K (last dose 2/15/22  - Received venofer 200mg x4, last dose 2/15,   - Per cardio and GI, keep hg > 8 g/dL  - Ferritin 43 on 2/15, TSAT 14% 1/31  - Check TSH  - vWF activt was 198%  - Patient has week IgG kappa band,  SPEP unable to quantitate, FLC ratio wnl,    Thrombovytopenia: Intermittent since 2019, possibly from consumption, MDS?  - MOnitro CBC   Ms. Marroquin is a 76 year old (ex smoker) female patient known to have severe AS (TTE 02/17/22 EF 74%, severe AS with A 0.8cm2 and mean P 48.9,  mild MR), anemia Likely Secondary to obscure GI bleeding (from Duodenum and Gastric AVM per recent capsule endoscopy 02/2022 after presenting with melena), Fe Deficiency (TFN S% 5 in 2020 and ferritin 28 in 2022), and CKD (baseline 1.7-2.2) , gets transfusions every 1-2 weeks and Retacrit 93302 units weekly, erosive gastritis presents for hemoglobin 4.5 g/dL on outpatient labs.    Chronic anemia: Multifactorial including GI bleed (has AVM on capsule endoscopy) and CKD, macrocytic with normal B12, folate, MDS possible  - Per cardio and GI, keep hg > 8 g/dL  - Ferritin 43 on 2/15, TSAT 14% 1/31  - vWF activt was 198%  - Patient has week IgG kappa band,  SPEP unable to quantitate, FLC ratio wnl  - RECHECK iron studies, if iron studies still consistent with DARRIN then will give more venofer  - GIVE PROCRIT 20,000 units (last dose was 2/15/22) if BP controlled  - CHECK vWF antigen, activity and multimers, r/o Heyde syndrome  - Check TSH  - May transfuse to required minimum hemoglobin level if needed TAVR with appropriate diuresis; patient needs definitive treatment with endoscopy or she will remain transfusion dependent which comes with its own risks.     Mild thrombocytopenia: Intermittent since 2019, possibly from consumption, MDS?  - Monitor CBC

## 2022-03-06 LAB
ANION GAP SERPL CALC-SCNC: 13 MMOL/L — SIGNIFICANT CHANGE UP (ref 7–14)
BUN SERPL-MCNC: 75 MG/DL — CRITICAL HIGH (ref 10–20)
CALCIUM SERPL-MCNC: 8.3 MG/DL — LOW (ref 8.5–10.1)
CHLORIDE SERPL-SCNC: 107 MMOL/L — SIGNIFICANT CHANGE UP (ref 98–110)
CO2 SERPL-SCNC: 23 MMOL/L — SIGNIFICANT CHANGE UP (ref 17–32)
CREAT SERPL-MCNC: 3.3 MG/DL — HIGH (ref 0.7–1.5)
EGFR: 14 ML/MIN/1.73M2 — LOW
FERRITIN SERPL-MCNC: 58 NG/ML — SIGNIFICANT CHANGE UP (ref 15–150)
FOLATE SERPL-MCNC: 6.1 NG/ML — SIGNIFICANT CHANGE UP
GLUCOSE SERPL-MCNC: 135 MG/DL — HIGH (ref 70–99)
HCT VFR BLD CALC: 26.9 % — LOW (ref 37–47)
HGB BLD-MCNC: 8.5 G/DL — LOW (ref 12–16)
MAGNESIUM SERPL-MCNC: 2.3 MG/DL — SIGNIFICANT CHANGE UP (ref 1.8–2.4)
MCHC RBC-ENTMCNC: 28.1 PG — SIGNIFICANT CHANGE UP (ref 27–31)
MCHC RBC-ENTMCNC: 31.6 G/DL — LOW (ref 32–37)
MCV RBC AUTO: 89.1 FL — SIGNIFICANT CHANGE UP (ref 81–99)
NRBC # BLD: 0 /100 WBCS — SIGNIFICANT CHANGE UP (ref 0–0)
PLATELET # BLD AUTO: 144 K/UL — SIGNIFICANT CHANGE UP (ref 130–400)
POTASSIUM SERPL-MCNC: 4.6 MMOL/L — SIGNIFICANT CHANGE UP (ref 3.5–5)
POTASSIUM SERPL-SCNC: 4.6 MMOL/L — SIGNIFICANT CHANGE UP (ref 3.5–5)
RBC # BLD: 3.02 M/UL — LOW (ref 4.2–5.4)
RBC # FLD: 19.9 % — HIGH (ref 11.5–14.5)
SODIUM SERPL-SCNC: 143 MMOL/L — SIGNIFICANT CHANGE UP (ref 135–146)
VIT B12 SERPL-MCNC: 319 PG/ML — SIGNIFICANT CHANGE UP (ref 232–1245)
WBC # BLD: 7.1 K/UL — SIGNIFICANT CHANGE UP (ref 4.8–10.8)
WBC # FLD AUTO: 7.1 K/UL — SIGNIFICANT CHANGE UP (ref 4.8–10.8)

## 2022-03-06 PROCEDURE — 99233 SBSQ HOSP IP/OBS HIGH 50: CPT

## 2022-03-06 RX ADMIN — Medication 25 MILLIGRAM(S): at 09:37

## 2022-03-06 RX ADMIN — Medication 25 MILLIGRAM(S): at 06:05

## 2022-03-06 RX ADMIN — Medication 20 MILLIGRAM(S): at 09:37

## 2022-03-06 RX ADMIN — PANTOPRAZOLE SODIUM 40 MILLIGRAM(S): 20 TABLET, DELAYED RELEASE ORAL at 06:05

## 2022-03-06 RX ADMIN — CHLORHEXIDINE GLUCONATE 1 APPLICATION(S): 213 SOLUTION TOPICAL at 06:05

## 2022-03-06 RX ADMIN — Medication 40 MILLIGRAM(S): at 06:05

## 2022-03-06 RX ADMIN — PANTOPRAZOLE SODIUM 40 MILLIGRAM(S): 20 TABLET, DELAYED RELEASE ORAL at 17:08

## 2022-03-06 RX ADMIN — Medication 25 MILLIGRAM(S): at 17:08

## 2022-03-06 RX ADMIN — Medication 40 MILLIGRAM(S): at 17:08

## 2022-03-06 RX ADMIN — Medication 25 MILLIGRAM(S): at 01:07

## 2022-03-06 NOTE — PROGRESS NOTE ADULT - SUBJECTIVE AND OBJECTIVE BOX
LATRICIA ARREAGA  76y Female    INTERVAL HPI/OVERNIGHT EVENTS:    Pt c/o pruritis - per RN, she c/o itching prior to the last blood transfusion but pt said it occurred after the transfusion  less SOB  denies chest pain, abdominal pain, N/V  no bleeding per RN  no fever    T(F): 96.4 (03-06-22 @ 08:59), Max: 96.4 (03-05-22 @ 16:00)  HR: 66 (03-06-22 @ 08:59)   BP: 112/48 (03-06-22 @ 08:59) (112/48 - 125/56)  RR: 18 (03-06-22 @ 08:59) (18 - 18)  SpO2: 99% (03-06-22 @ 08:59) (96% - 99%) on 3L NC    I&O's Summary    05 Mar 2022 07:01  -  06 Mar 2022 07:00  --------------------------------------------------------  IN: 0 mL / OUT: 750 mL / NET: -750 mL      PHYSICAL EXAM:  GENERAL: NAD in bed  HEAD:  Normocephalic  EYES:  conjunctiva and sclera clear  ENMT: Moist mucous membranes  NERVOUS SYSTEM:  Alert, awake, Good concentration  CHEST/LUNG: crackles of lower lung fields  HEART: Regular rate and rhythm; 3/6 KATHYA  ABDOMEN: Soft, Nontender, Nondistended; Bowel sounds present  EXTREMITIES:   + LE edema b/l  SKIN: excoriations of UE, maculopapular rash of chest and back with blanching    Consultant(s) Notes Reviewed:  [x ] YES  [ ] NO  Care Discussed with Consultants/Other Providers [ x] YES  [ ] NO    MEDICATIONS  (STANDING):  chlorhexidine 4% Liquid 1 Application(s) Topical <User Schedule>  furosemide   Injectable 40 milliGRAM(s) IV Push every 12 hours  metoprolol tartrate 25 milliGRAM(s) Oral two times a day  pantoprazole  Injectable 40 milliGRAM(s) IV Push every 12 hours    MEDICATIONS  (PRN):  acetaminophen     Tablet .. 650 milliGRAM(s) Oral every 6 hours PRN Mild Pain (1 - 3)  aluminum hydroxide/magnesium hydroxide/simethicone Suspension 30 milliLiter(s) Oral every 4 hours PRN Dyspepsia  diphenhydrAMINE 25 milliGRAM(s) Oral every 4 hours PRN Rash and/or Itching  ondansetron Injectable 4 milliGRAM(s) IV Push every 8 hours PRN Nausea and/or Vomiting      LABS:                        8.5    7.10  )-----------( 144      ( 06 Mar 2022 11:00 )             26.9     03-05    144  |  110  |  69<HH>  ----------------------------<  94  5.5<H>   |  18  |  3.1<H>    Ca    8.4<L>      05 Mar 2022 04:30  Mg     2.3     03-05    TPro  5.7<L>  /  Alb  3.3<L>  /  TBili  1.4<H>  /  DBili  x   /  AST  16  /  ALT  6   /  AlkPhos  66  03-05            Case discussed with RN today    Care discussed with pt

## 2022-03-06 NOTE — PROGRESS NOTE ADULT - ASSESSMENT
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 In the ED, she was hemodynamically stable with a Hgb of 4.5 and received 3 units of PRBC. Workup also showed acute CHF and she was placed on IV diuresis.    1. Anemia - acute over chronic   likely due to recurrent GI bleed in setting of CKD and iron deficiency anemia  had GI workup recently including capsule endoscopy 2/22 that showed duodenum and gastric AVMs  EGD/colonoscopy: erosive gastritis, moderate diverticulosis, grade 2 internal hemorrhoids  this admission: OLEKSANDR with brown stool and Hgb 4.5  s/p 3 units PRBC -> Hgb 7.5 - another Unit given -> 8 - >8.5  goal Hgb >8   check CBC in AM  keep active T&S  PPI IV q12hr  on clear liquid diet - > advance to soft diet and monitor  GI and HemOnc evals appreciated  per GI consult, pt and son want to hold off on endoscopic intervention unless active bleeding with hemodynamic instability  repeat iron studies  s/p venofer   on blood transfusions q1-2 weeks and weekly retacrit 96865 units    2. Acute HFpEF / severe AS  continue lasix 40mg IV q12hr for volume overload  daily wts and I's and O's, fluid restriction 1.5L/day  monitor BMP, Mg  cardio consult appreciated: cath and possible TAVR potentially in future once active GI bleed, anemia, and EVAN have resolved   CXR in AM    3. EVAN over CKD 4 vs progressive CKD  Baseline Cr 1.7-2.2 in early 2022  Recent admission with Cr 2.7-2.9   no hydronephrosis on US  ?CRS - continue diuresis for volume overload and monitor   daily BMP    4. HTN on metoprolol - BP acceptable    5. Hyperkalemia - hemolyzed sample yesterday  daily BMP  lokelma if needed for K 5.5 or higher    6. Morbid obesity BMI 46.3    7. DVT prophylaxis - SCD  venous duplex of LE negative for DVT    8. Thrombocytopenia - improving  f/u pending labs       PROGRESS NOTE HANDOFF    Pending: labs in AM, PT consult, improved volume status    pt aware of the plan of care and she states she will update her family    Disposition: [facility]                              Others  - DVT Prophylaxis: off. Check VA duplex venous LE  - GI Prophylaxis as detailed before  - Diet: NPO  - Code Status: Full       77 y/o woman with PMH of severe AS, HTN, CKD 4, sciatica and anemia recently found to have evidence of bleeding from gastric AVM and duodenum on capsule endoscopy and currently on q1-2 weekly transfusions and retacrit weekly now presented to the ED on 03/04 for evaluation of low hemoglobin on outpatient labs. In the ED, she was hemodynamically stable with a Hgb of 4.5 and received 3 units of PRBC. Workup also showed acute CHF and she was placed on IV diuresis.    1. Anemia - acute over chronic   likely due to recurrent GI bleed in setting of CKD and iron deficiency anemia  had GI workup recently including capsule endoscopy 2/22 that showed duodenum and gastric AVMs  EGD/colonoscopy: erosive gastritis, moderate diverticulosis, grade 2 internal hemorrhoids  this admission: OLEKSANDR with brown stool and Hgb 4.5  s/p 3 units PRBC -> Hgb 7.5 - another Unit given -> 8 - >8.5  goal Hgb >8   check CBC in AM  keep active T&S  PPI IV q12hr  on clear liquid diet - > advance to soft diet and monitor  GI and HemOnc evals appreciated  per GI consult, pt and son want to hold off on endoscopic intervention unless active bleeding with hemodynamic instability  repeat iron studies  s/p venofer   on blood transfusions q1-2 weeks and weekly retacrit 81649 units    2. Acute HFpEF / severe AS  continue lasix 40mg IV q12hr for volume overload  daily wts and I's and O's, fluid restriction 1.5L/day  monitor BMP, Mg  cardio consult appreciated: cath and possible TAVR potentially in future once active GI bleed, anemia, and EVAN have resolved   CXR in AM    3. EVAN over CKD 4 vs progressive CKD  Baseline Cr 1.7-2.2 in early 2022  Recent admission with Cr 2.7-2.9   no hydronephrosis on US  ?CRS - continue diuresis for volume overload and monitor   daily BMP    4. HTN on metoprolol - BP acceptable    5. Hyperkalemia - hemolyzed sample yesterday  daily BMP  lokelma if needed for K 5.5 or higher    6. Morbid obesity BMI 46.3    7. DVT prophylaxis - SCD  venous duplex of LE negative for DVT    8. Thrombocytopenia - improving  f/u pending labs       PROGRESS NOTE HANDOFF    Pending: labs in AM, PT consult, improved volume status    pt aware of the plan of care and she states she will update her family    Disposition: [facility]

## 2022-03-07 LAB
ALBUMIN SERPL ELPH-MCNC: 3.3 G/DL — LOW (ref 3.5–5.2)
ALP SERPL-CCNC: 67 U/L — SIGNIFICANT CHANGE UP (ref 30–115)
ALT FLD-CCNC: 7 U/L — SIGNIFICANT CHANGE UP (ref 0–41)
ANION GAP SERPL CALC-SCNC: 14 MMOL/L — SIGNIFICANT CHANGE UP (ref 7–14)
AST SERPL-CCNC: 17 U/L — SIGNIFICANT CHANGE UP (ref 0–41)
BILIRUB SERPL-MCNC: 1.1 MG/DL — SIGNIFICANT CHANGE UP (ref 0.2–1.2)
BUN SERPL-MCNC: 74 MG/DL — CRITICAL HIGH (ref 10–20)
CALCIUM SERPL-MCNC: 8.2 MG/DL — LOW (ref 8.5–10.1)
CHLORIDE SERPL-SCNC: 109 MMOL/L — SIGNIFICANT CHANGE UP (ref 98–110)
CO2 SERPL-SCNC: 22 MMOL/L — SIGNIFICANT CHANGE UP (ref 17–32)
CREAT SERPL-MCNC: 3.2 MG/DL — HIGH (ref 0.7–1.5)
EGFR: 14 ML/MIN/1.73M2 — LOW
GLUCOSE SERPL-MCNC: 89 MG/DL — SIGNIFICANT CHANGE UP (ref 70–99)
HCT VFR BLD CALC: 25.6 % — LOW (ref 37–47)
HCT VFR BLD CALC: 26.1 % — LOW (ref 37–47)
HGB BLD-MCNC: 7.8 G/DL — LOW (ref 12–16)
HGB BLD-MCNC: 7.9 G/DL — LOW (ref 12–16)
IRON SATN MFR SERPL: 15 % — SIGNIFICANT CHANGE UP (ref 15–50)
IRON SATN MFR SERPL: 43 UG/DL — SIGNIFICANT CHANGE UP (ref 35–150)
MAGNESIUM SERPL-MCNC: 2.4 MG/DL — SIGNIFICANT CHANGE UP (ref 1.8–2.4)
MCHC RBC-ENTMCNC: 27.6 PG — SIGNIFICANT CHANGE UP (ref 27–31)
MCHC RBC-ENTMCNC: 28.3 PG — SIGNIFICANT CHANGE UP (ref 27–31)
MCHC RBC-ENTMCNC: 29.9 G/DL — LOW (ref 32–37)
MCHC RBC-ENTMCNC: 30.9 G/DL — LOW (ref 32–37)
MCV RBC AUTO: 91.8 FL — SIGNIFICANT CHANGE UP (ref 81–99)
MCV RBC AUTO: 92.2 FL — SIGNIFICANT CHANGE UP (ref 81–99)
NRBC # BLD: 0 /100 WBCS — SIGNIFICANT CHANGE UP (ref 0–0)
NRBC # BLD: 0 /100 WBCS — SIGNIFICANT CHANGE UP (ref 0–0)
PLATELET # BLD AUTO: 136 K/UL — SIGNIFICANT CHANGE UP (ref 130–400)
PLATELET # BLD AUTO: 140 K/UL — SIGNIFICANT CHANGE UP (ref 130–400)
POTASSIUM SERPL-MCNC: 4.8 MMOL/L — SIGNIFICANT CHANGE UP (ref 3.5–5)
POTASSIUM SERPL-SCNC: 4.8 MMOL/L — SIGNIFICANT CHANGE UP (ref 3.5–5)
PROT SERPL-MCNC: 5.6 G/DL — LOW (ref 6–8)
RBC # BLD: 2.79 M/UL — LOW (ref 4.2–5.4)
RBC # BLD: 2.83 M/UL — LOW (ref 4.2–5.4)
RBC # FLD: 19.1 % — HIGH (ref 11.5–14.5)
RBC # FLD: 19.2 % — HIGH (ref 11.5–14.5)
SODIUM SERPL-SCNC: 145 MMOL/L — SIGNIFICANT CHANGE UP (ref 135–146)
TIBC SERPL-MCNC: 294 UG/DL — SIGNIFICANT CHANGE UP (ref 220–430)
UIBC SERPL-MCNC: 251 UG/DL — SIGNIFICANT CHANGE UP (ref 110–370)
WBC # BLD: 4.99 K/UL — SIGNIFICANT CHANGE UP (ref 4.8–10.8)
WBC # BLD: 5.66 K/UL — SIGNIFICANT CHANGE UP (ref 4.8–10.8)
WBC # FLD AUTO: 4.99 K/UL — SIGNIFICANT CHANGE UP (ref 4.8–10.8)
WBC # FLD AUTO: 5.66 K/UL — SIGNIFICANT CHANGE UP (ref 4.8–10.8)

## 2022-03-07 PROCEDURE — 71045 X-RAY EXAM CHEST 1 VIEW: CPT | Mod: 26

## 2022-03-07 PROCEDURE — 99232 SBSQ HOSP IP/OBS MODERATE 35: CPT

## 2022-03-07 PROCEDURE — 99233 SBSQ HOSP IP/OBS HIGH 50: CPT

## 2022-03-07 RX ORDER — IRON SUCROSE 20 MG/ML
200 INJECTION, SOLUTION INTRAVENOUS EVERY 24 HOURS
Refills: 0 | Status: COMPLETED | OUTPATIENT
Start: 2022-03-07 | End: 2022-03-09

## 2022-03-07 RX ORDER — ERYTHROPOIETIN 10000 [IU]/ML
20000 INJECTION, SOLUTION INTRAVENOUS; SUBCUTANEOUS ONCE
Refills: 0 | Status: COMPLETED | OUTPATIENT
Start: 2022-03-07 | End: 2022-03-07

## 2022-03-07 RX ADMIN — Medication 25 MILLIGRAM(S): at 17:26

## 2022-03-07 RX ADMIN — Medication 25 MILLIGRAM(S): at 05:41

## 2022-03-07 RX ADMIN — Medication 25 MILLIGRAM(S): at 23:47

## 2022-03-07 RX ADMIN — Medication 40 MILLIGRAM(S): at 17:26

## 2022-03-07 RX ADMIN — IRON SUCROSE 110 MILLIGRAM(S): 20 INJECTION, SOLUTION INTRAVENOUS at 10:36

## 2022-03-07 RX ADMIN — Medication 25 MILLIGRAM(S): at 00:01

## 2022-03-07 RX ADMIN — PANTOPRAZOLE SODIUM 40 MILLIGRAM(S): 20 TABLET, DELAYED RELEASE ORAL at 05:42

## 2022-03-07 RX ADMIN — ERYTHROPOIETIN 20000 UNIT(S): 10000 INJECTION, SOLUTION INTRAVENOUS; SUBCUTANEOUS at 10:35

## 2022-03-07 RX ADMIN — Medication 25 MILLIGRAM(S): at 05:45

## 2022-03-07 RX ADMIN — Medication 40 MILLIGRAM(S): at 05:43

## 2022-03-07 RX ADMIN — CHLORHEXIDINE GLUCONATE 1 APPLICATION(S): 213 SOLUTION TOPICAL at 05:43

## 2022-03-07 RX ADMIN — Medication 25 MILLIGRAM(S): at 18:50

## 2022-03-07 RX ADMIN — PANTOPRAZOLE SODIUM 40 MILLIGRAM(S): 20 TABLET, DELAYED RELEASE ORAL at 17:27

## 2022-03-07 NOTE — PROGRESS NOTE ADULT - ASSESSMENT
Ms. Marroquin is a 76 year old (ex smoker) female patient known to have severe AS (TTE 02/17/22 EF 74%, severe AS with A 0.8cm2 and mean P 48.9,  mild MR), anemia Likely Secondary to obscure GI bleeding (from Duodenum and Gastric AVM per recent capsule endoscopy 02/2022 after presenting with melena), Fe Deficiency (TFN S% 5 in 2020 and ferritin 28 in 2022), and CKD (baseline 1.7-2.2) , gets transfusions every 1-2 weeks and Retacrit 74947 units weekly, erosive gastritis presents for hemoglobin 4.5 g/dL on outpatient labs. Patient also informed that in her facility a few weeks ago she did have black stools    IMPRESSION:    #Severe anemia is multifactorial and due to CKD as well as gastrointestinal blood loss likely due to AVM maybe due to severe AS   -Workup so far: Retic count: 5.5%,  (hemolyzed), Ferritin 58, folate 6.1, B12 319    - vWF activity was 198%   -  Patient has weak IgG kappa band,  SPEP unable to quantitate, FLC ratio wnl  - Unfortunately she is in a situation where she needs valve repair with TAVR for which patient needs a hemoglobin of greater than or equal to 10 and the GI team is reluctant to take patient for endoscopic intervention for endoscopic therapy due to her cardiac comorbidities.    #Mild intermittent thrombocytopenia normal may be due to consumption from GI bleed or splenic sequestration/hypersplenism if have elevated portal hypertension to valvular heart disease     RECOMMENDATIONS:    -We will continue with Procrit 20,000 units every other week; Next dose is due now as long as blood pressure is controlled: Can give Procrit 51468S today 3/7  -She has been undergoing Venofer infusions as well, as repeat ferritin is under 100 (58 on 3/5/22) or iron saturation less than 20%, can administer Venofer 200 mg IV for 3 doses for now   -GI is following  -Can transfuse to keep hemoglobin greater than 7-8 depending on symptoms  -If hemoglobin of 10 is needed and patient continues to bleed it may not be possible to achieve this goal for the TAVR, thus we can administer a unit a day as long as the cardiology team feels patient will be able to tolerate this order to achieve the goal for her  procedure  -Her von Willebrand activity was 198, can check antigen on multimer                   Ms. Marroquin is a 76 year old (ex smoker) female patient known to have severe AS (TTE 02/17/22 EF 74%, severe AS with A 0.8cm2 and mean P 48.9,  mild MR), anemia Likely Secondary to obscure GI bleeding (from Duodenum and Gastric AVM per recent capsule endoscopy 02/2022 after presenting with melena), Fe Deficiency (TFN S% 5 in 2020 and ferritin 28 in 2022), and CKD (baseline 1.7-2.2) , gets transfusions every 1-2 weeks and Retacrit 96618 units weekly, erosive gastritis presents for hemoglobin 4.5 g/dL on outpatient labs. Patient also informed that in her facility a few weeks ago she did have black stools    IMPRESSION:    #Severe anemia is multifactorial and due to CKD as well as gastrointestinal blood loss likely due to AVM maybe due to severe AS   -Workup so far: Retic count: 5.5%,  (hemolyzed), Ferritin 58, folate 6.1, B12 319    - vWF activity was 198%   -  Patient has weak IgG kappa band,  SPEP unable to quantitate, FLC ratio wnl  - Unfortunately she is in a situation where she needs valve repair with TAVR for which patient needs a hemoglobin of greater than or equal to 10 and the GI team is reluctant to take patient for endoscopic intervention for endoscopic therapy due to her cardiac comorbidities.    #Mild intermittent thrombocytopenia normal may be due to consumption from GI bleed or splenic sequestration/hypersplenism if have elevated portal hypertension to valvular heart disease     RECOMMENDATIONS:    -We will continue with Procrit 20,000 units every other week; Next dose is due now as long as blood pressure is controlled: Can give Procrit 56659S today 3/7  -She has been undergoing Venofer infusions as well, as repeat ferritin is under 100 (58 on 3/5/22) or iron saturation less than 20%, can administer Venofer 200 mg IV for 3 doses for now   -GI is following  -Can transfuse to keep hemoglobin greater than 7-8 depending on symptoms  -If hemoglobin of 10 is needed and patient continues to bleed it may not be possible to achieve this goal for the TAVR, thus we can administer a unit a day as long as the cardiology team feels patient will be able to tolerate this order to achieve the goal for her  procedure  -Her von Willebrand activity was 198, can check antigen on multimer   Ms. Marroquin is a 76 year old (ex smoker) female patient known to have severe AS (TTE 02/17/22 EF 74%, severe AS with A 0.8cm2 and mean P 48.9,  mild MR), anemia Likely Secondary to obscure GI bleeding (from Duodenum and Gastric AVM per recent capsule endoscopy 02/2022 after presenting with melena), Fe Deficiency (TFN S% 5 in 2020 and ferritin 28 in 2022), and CKD (baseline 1.7-2.2) , gets transfusions every 1-2 weeks and Retacrit 16189 units weekly, erosive gastritis presents for hemoglobin 4.5 g/dL on outpatient labs. Patient also informed that in her facility a few weeks ago she did have black stools    IMPRESSION:    #Severe anemia is multifactorial and due to CKD as well as gastrointestinal blood loss likely due to AVM maybe due to severe AS   -Workup so far: Retic count: 5.5%,  (hemolyzed), Ferritin 58, folate 6.1, B12 319    - vWF activity was 198%   -  Patient has weak IgG kappa band,  SPEP unable to quantitate, FLC ratio wnl  - Unfortunately she is in a situation where she needs valve repair with TAVR for which patient needs a hemoglobin of greater than or equal to 10 and the GI team is reluctant to take patient for endoscopic intervention for endoscopic therapy due to her cardiac comorbidities.    #Mild intermittent thrombocytopenia normal may be due to consumption from GI bleed or splenic sequestration/hypersplenism if have elevated portal hypertension to valvular heart disease     RECOMMENDATIONS:    -We will continue with Procrit 20,000 units every other week; Next dose is due now as long as blood pressure is controlled: Can give Procrit [id number] today 3/7  -She has been undergoing Venofer infusions as well, as repeat ferritin is under 100 (58 on 3/5/22) or iron saturation less than 20%, can administer Venofer 200 mg IV for 3 doses for now. Follow up rest of the iron studies.    -GI is following  -Can transfuse to keep hemoglobin greater than 7-8 depending on symptoms  -If hemoglobin of 10 is needed and patient continues to bleed it may not be possible to achieve this goal for the TAVR, thus we can administer a unit a day as long as the cardiology team feels patient will be able to tolerate this order to achieve the goal for her procedure  -Her von Willebrand activity was 198, can check antigen and multimer

## 2022-03-07 NOTE — PROGRESS NOTE ADULT - SUBJECTIVE AND OBJECTIVE BOX
Patient is a 76y old  Female who presents with a chief complaint of Low Hb (06 Mar 2022 12:59)    INTERVAL HPI/OVERNIGHT EVENTS:   No overnight events   Afebrile, hemodynamically stable     Vital Signs Last 24 Hrs  T(C): 35.8 (06 Mar 2022 16:00), Max: 35.8 (06 Mar 2022 08:59)  T(F): 96.5 (06 Mar 2022 16:00), Max: 96.5 (06 Mar 2022 16:00)  HR: 69 (07 Mar 2022 05:41) (66 - 69)  BP: 128/60 (07 Mar 2022 05:41) (112/48 - 130/62)  BP(mean): --  ABP: --  ABP(mean): --  RR: 18 (06 Mar 2022 16:00) (18 - 18)  SpO2: 99% (06 Mar 2022 16:00) (99% - 99%)    I&O's Summary    06 Mar 2022 07:01  -  07 Mar 2022 07:00  --------------------------------------------------------  IN: 520 mL / OUT: 0 mL / NET: 520 mL          LABS:                        8.5    7.10  )-----------( 144      ( 06 Mar 2022 11:00 )             26.9     03-06    143  |  107  |  75<HH>  ----------------------------<  135<H>  4.6   |  23  |  3.3<H>    Ca    8.3<L>      06 Mar 2022 19:12  Mg     2.3     03-06          CAPILLARY BLOOD GLUCOSE            RADIOLOGY & ADDITIONAL TESTS:    Consultant(s) Notes Reviewed:  [x ] YES  [ ] NO    MEDICATIONS  (STANDING):  chlorhexidine 4% Liquid 1 Application(s) Topical <User Schedule>  furosemide   Injectable 40 milliGRAM(s) IV Push every 12 hours  metoprolol tartrate 25 milliGRAM(s) Oral two times a day  pantoprazole  Injectable 40 milliGRAM(s) IV Push every 12 hours    MEDICATIONS  (PRN):  acetaminophen     Tablet .. 650 milliGRAM(s) Oral every 6 hours PRN Mild Pain (1 - 3)  aluminum hydroxide/magnesium hydroxide/simethicone Suspension 30 milliLiter(s) Oral every 4 hours PRN Dyspepsia  diphenhydrAMINE 25 milliGRAM(s) Oral every 4 hours PRN Rash and/or Itching  ondansetron Injectable 4 milliGRAM(s) IV Push every 8 hours PRN Nausea and/or Vomiting      PHYSICAL EXAM:  GENERAL:  Patient is a 76y old  Female who presents with a chief complaint of Low Hb (06 Mar 2022 12:59)    INTERVAL HPI/OVERNIGHT EVENTS:   No overnight events   Afebrile, hemodynamically stable     Vital Signs Last 24 Hrs  T(C): 35.8 (06 Mar 2022 16:00), Max: 35.8 (06 Mar 2022 08:59)  T(F): 96.5 (06 Mar 2022 16:00), Max: 96.5 (06 Mar 2022 16:00)  HR: 69 (07 Mar 2022 05:41) (66 - 69)  BP: 128/60 (07 Mar 2022 05:41) (112/48 - 130/62)  BP(mean): --  ABP: --  ABP(mean): --  RR: 18 (06 Mar 2022 16:00) (18 - 18)  SpO2: 99% (06 Mar 2022 16:00) (99% - 99%)    I&O's Summary    06 Mar 2022 07:01  -  07 Mar 2022 07:00  --------------------------------------------------------  IN: 520 mL / OUT: 0 mL / NET: 520 mL          LABS:                        8.5    7.10  )-----------( 144      ( 06 Mar 2022 11:00 )             26.9     03-06    143  |  107  |  75<HH>  ----------------------------<  135<H>  4.6   |  23  |  3.3<H>    Ca    8.3<L>      06 Mar 2022 19:12  Mg     2.3     03-06          CAPILLARY BLOOD GLUCOSE            RADIOLOGY & ADDITIONAL TESTS:    Consultant(s) Notes Reviewed:  [x ] YES  [ ] NO    MEDICATIONS  (STANDING):  chlorhexidine 4% Liquid 1 Application(s) Topical <User Schedule>  furosemide   Injectable 40 milliGRAM(s) IV Push every 12 hours  metoprolol tartrate 25 milliGRAM(s) Oral two times a day  pantoprazole  Injectable 40 milliGRAM(s) IV Push every 12 hours    MEDICATIONS  (PRN):  acetaminophen     Tablet .. 650 milliGRAM(s) Oral every 6 hours PRN Mild Pain (1 - 3)  aluminum hydroxide/magnesium hydroxide/simethicone Suspension 30 milliLiter(s) Oral every 4 hours PRN Dyspepsia  diphenhydrAMINE 25 milliGRAM(s) Oral every 4 hours PRN Rash and/or Itching  ondansetron Injectable 4 milliGRAM(s) IV Push every 8 hours PRN Nausea and/or Vomiting      PHYSICAL EXAM:  GENERAL: in NAD  HEENT: atraumatic  Cardiac: 4/6 systolic murmur  Respiratory: CTAB. no rales, rhonchi or wheezing  Abdomen: no tenderness to palpation. bowel sounds present  Extremities: bilateral LE edema  Skin: no rashes or lesions Patient is a 76y old  Female who presents with a chief complaint of Low Hb (06 Mar 2022 12:59)    INTERVAL HPI/OVERNIGHT EVENTS:   No overnight events   Afebrile, hemodynamically stable     Vital Signs Last 24 Hrs  T(C): 35.8 (06 Mar 2022 16:00), Max: 35.8 (06 Mar 2022 08:59)  T(F): 96.5 (06 Mar 2022 16:00), Max: 96.5 (06 Mar 2022 16:00)  HR: 69 (07 Mar 2022 05:41) (66 - 69)  BP: 128/60 (07 Mar 2022 05:41) (112/48 - 130/62)  BP(mean): --  ABP: --  ABP(mean): --  RR: 18 (06 Mar 2022 16:00) (18 - 18)  SpO2: 99% (06 Mar 2022 16:00) (99% - 99%)    I&O's Summary    06 Mar 2022 07:01  -  07 Mar 2022 07:00  --------------------------------------------------------  IN: 520 mL / OUT: 0 mL / NET: 520 mL          LABS:                        8.5    7.10  )-----------( 144      ( 06 Mar 2022 11:00 )             26.9     03-06    143  |  107  |  75<HH>  ----------------------------<  135<H>  4.6   |  23  |  3.3<H>    Ca    8.3<L>      06 Mar 2022 19:12  Mg     2.3     03-06          CAPILLARY BLOOD GLUCOSE            RADIOLOGY & ADDITIONAL TESTS:    Consultant(s) Notes Reviewed:  [x ] YES  [ ] NO    MEDICATIONS  (STANDING):  chlorhexidine 4% Liquid 1 Application(s) Topical <User Schedule>  furosemide   Injectable 40 milliGRAM(s) IV Push every 12 hours  metoprolol tartrate 25 milliGRAM(s) Oral two times a day  pantoprazole  Injectable 40 milliGRAM(s) IV Push every 12 hours    MEDICATIONS  (PRN):  acetaminophen     Tablet .. 650 milliGRAM(s) Oral every 6 hours PRN Mild Pain (1 - 3)  aluminum hydroxide/magnesium hydroxide/simethicone Suspension 30 milliLiter(s) Oral every 4 hours PRN Dyspepsia  diphenhydrAMINE 25 milliGRAM(s) Oral every 4 hours PRN Rash and/or Itching  ondansetron Injectable 4 milliGRAM(s) IV Push every 8 hours PRN Nausea and/or Vomiting      PHYSICAL EXAM:  GENERAL: in NAD  HEENT: atraumatic  Cardiac: 4/6 systolic murmur  Respiratory: bilateral decreased breath sounds. R rales  Abdomen: no tenderness to palpation. bowel sounds present  Extremities: bilateral LE edema  Skin: no rashes or lesions Patient is a 76y old  Female who presents with a chief complaint of Low Hb (06 Mar 2022 12:59)    INTERVAL HPI/OVERNIGHT EVENTS:   No overnight events   Afebrile, hemodynamically stable     Vital Signs Last 24 Hrs  T(C): 35.8 (06 Mar 2022 16:00), Max: 35.8 (06 Mar 2022 08:59)  T(F): 96.5 (06 Mar 2022 16:00), Max: 96.5 (06 Mar 2022 16:00)  HR: 69 (07 Mar 2022 05:41) (66 - 69)  BP: 128/60 (07 Mar 2022 05:41) (112/48 - 130/62)  BP(mean): --  ABP: --  ABP(mean): --  RR: 18 (06 Mar 2022 16:00) (18 - 18)  SpO2: 99% (06 Mar 2022 16:00) (99% - 99%)    I&O's Summary    06 Mar 2022 07:01  -  07 Mar 2022 07:00  --------------------------------------------------------  IN: 520 mL / OUT: 0 mL / NET: 520 mL          LABS:                        8.5    7.10  )-----------( 144      ( 06 Mar 2022 11:00 )             26.9     03-06    143  |  107  |  75<HH>  ----------------------------<  135<H>  4.6   |  23  |  3.3<H>    Ca    8.3<L>      06 Mar 2022 19:12  Mg     2.3     03-06          CAPILLARY BLOOD GLUCOSE            RADIOLOGY & ADDITIONAL TESTS:    Consultant(s) Notes Reviewed:  [x ] YES  [ ] NO    MEDICATIONS  (STANDING):  chlorhexidine 4% Liquid 1 Application(s) Topical <User Schedule>  furosemide   Injectable 40 milliGRAM(s) IV Push every 12 hours  metoprolol tartrate 25 milliGRAM(s) Oral two times a day  pantoprazole  Injectable 40 milliGRAM(s) IV Push every 12 hours    MEDICATIONS  (PRN):  acetaminophen     Tablet .. 650 milliGRAM(s) Oral every 6 hours PRN Mild Pain (1 - 3)  aluminum hydroxide/magnesium hydroxide/simethicone Suspension 30 milliLiter(s) Oral every 4 hours PRN Dyspepsia  diphenhydrAMINE 25 milliGRAM(s) Oral every 4 hours PRN Rash and/or Itching  ondansetron Injectable 4 milliGRAM(s) IV Push every 8 hours PRN Nausea and/or Vomiting      PHYSICAL EXAM:  GENERAL: in NAD  HEENT: atraumatic  Cardiac: 3/6 systolic murmur  Respiratory: bilateral decreased breath sounds. R rales  Abdomen: no tenderness to palpation. bowel sounds present  Extremities: bilateral LE edema  Skin: no rashes or lesions

## 2022-03-07 NOTE — PROGRESS NOTE ADULT - SUBJECTIVE AND OBJECTIVE BOX
LATRICIA ARREAGA  76y Female    INTERVAL HPI/OVERNIGHT EVENTS:    Pt looks better today  less SOB  denies chest pain, abdominal pain, N/V  no fever or bleeding  still with itchy rash of chest, back and arms  states she is usually premedicated prior to blood transfusions      T(F): 96.6 (03-07-22 @ 07:30), Max: 96.6 (03-07-22 @ 07:30)  HR: 66 (03-07-22 @ 07:30) (66 - 69)  BP: 114/53 (03-07-22 @ 07:30) (114/53 - 130/62)  RR: 19 (03-07-22 @ 07:40) (18 - 19)  SpO2: 92% (03-07-22 @ 07:40) (87% - 99%) 92% on 2L NC  87% on RA  I&O's Summary    06 Mar 2022 07:01  -  07 Mar 2022 07:00  --------------------------------------------------------  IN: 520 mL / OUT: 0 mL / NET: 520 mL      PHYSICAL EXAM:  GENERAL: NAD  HEAD:  Normocephalic  EYES:  conjunctiva and sclera clear  ENMT: Moist mucous membranes  NERVOUS SYSTEM:  Alert, awake, Good concentration  CHEST/LUNG: crackles at left base, right side clearer  HEART: Regular rate and rhythm; 3/6 KATHYA  ABDOMEN: Soft, Nontender, Nondistended; Bowel sounds present  EXTREMITIES:  + edema of LE but improving (right > left)  SKIN: erythematous maculopapular rash over chest and back    Consultant(s) Notes Reviewed:  [x ] YES  [ ] NO  Care Discussed with Consultants/Other Providers [ x] YES  [ ] NO    MEDICATIONS  (STANDING):  chlorhexidine 4% Liquid 1 Application(s) Topical <User Schedule>  furosemide   Injectable 40 milliGRAM(s) IV Push every 12 hours  iron sucrose IVPB 200 milliGRAM(s) IV Intermittent every 24 hours  metoprolol tartrate 25 milliGRAM(s) Oral two times a day  pantoprazole  Injectable 40 milliGRAM(s) IV Push every 12 hours    MEDICATIONS  (PRN):  acetaminophen     Tablet .. 650 milliGRAM(s) Oral every 6 hours PRN Mild Pain (1 - 3)  aluminum hydroxide/magnesium hydroxide/simethicone Suspension 30 milliLiter(s) Oral every 4 hours PRN Dyspepsia  diphenhydrAMINE 25 milliGRAM(s) Oral every 4 hours PRN Rash and/or Itching  ondansetron Injectable 4 milliGRAM(s) IV Push every 8 hours PRN Nausea and/or Vomiting      LABS:                        8.5    7.10  )-----------( 144      ( 06 Mar 2022 11:00 )             26.9     03-07    145  |  109  |  74<HH>  ----------------------------<  89  4.8   |  22  |  3.2<H>    Ca    8.2<L>      07 Mar 2022 06:30  Mg     2.4     03-07    TPro  5.6<L>  /  Alb  3.3<L>  /  TBili  1.1  /  DBili  x   /  AST  17  /  ALT  7   /  AlkPhos  67  03-07          RADIOLOGY & ADDITIONAL TESTS:    Imaging or report Personally Reviewed:  [x ] YES  [ ] NO    < from: Xray Chest 1 View- PORTABLE-Routine (Xray Chest 1 View- PORTABLE-Routine in AM.) (03.05.22 @ 17:00) >  IMPRESSION:    Stable bilateral lower lobe opacities and effusions, left greater than   right.    Stable, increased interstitial markings.    CXR 3/7 reviewed by me - unchanged from 3/5 - check official report    < end of copied text >      Case discussed with resident today    Care discussed with pt

## 2022-03-07 NOTE — PROGRESS NOTE ADULT - ASSESSMENT
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 In the ED, she was hemodynamically stable with a Hgb of 4.5 and received 3 units of PRBC. Workup also showed acute CHF and she was placed on IV diuresis.    1. Anemia - acute over chronic   likely due to recurrent GI bleed in setting of CKD and iron deficiency anemia  had GI workup recently including capsule endoscopy 2/22 that showed duodenum and gastric AVMs  EGD/colonoscopy: erosive gastritis, moderate diverticulosis, grade 2 internal hemorrhoids  this admission: OLEKSANDR with brown stool and Hgb 4.5  s/p 3 units PRBC -> Hgb 7.5 - another Unit given -> 8 - >8.5  goal Hgb >8   check CBC today and in AM  keep active T&S  PPI IV q12hr  tolerating soft diet   GI and HemOnc evals appreciated  per GI consult, pt and son want to hold off on endoscopic intervention unless active bleeding with hemodynamic instability  repeat iron studies  ferritin 58 so venofer ordered for 3 days   retacrit [id number] U x 1 today   on blood transfusions q1-2 weeks and weekly retacrit 41687 units as outpt    2. Acute HFpEF / severe AS  improving on lasix 40mg IV q12hr for volume overload (decreased LE edema today)  daily wts and I's and O's, fluid restriction 1.5L/day, low sodium diet  monitor BMP, Mg  cardio consult appreciated: cath and possible TAVR potentially in future once active GI bleed, anemia, and EVAN have resolved     3. EVAN over CKD 4 vs progressive CKD  Baseline Cr 1.7-2.2 in early 2022  Recent admission with Cr 2.7-2.9   no hydronephrosis on US  possibly due to CRS - continue diuresis for volume overload and monitor   daily BMP    4. HTN on metoprolol - BP acceptable    5. Hyperkalemia - resolved  daily BMP  lokelma if needed for K 5.5 or higher    6. Morbid obesity BMI 46.3  dietary eval    7. DVT prophylaxis - SCD  venous duplex of LE negative for DVT    8. Thrombocytopenia - improving  f/u pending labs     9. Rash - appears allergic - pt states that she usually has premedication before transfusions  on benadryl prn  s/p dose of prednisone yesterday  pt states symptoms have mildly improved - continue to monitor      PROGRESS NOTE HANDOFF    Pending: CBC today, labs in AM, PT consult, improved volume status    pt aware of the plan of care and she states she will update her family    Disposition: [facility]

## 2022-03-07 NOTE — PROGRESS NOTE ADULT - SUBJECTIVE AND OBJECTIVE BOX
Patient is a 76y old  Female who presents with a chief complaint of Low Hb (07 Mar 2022 07:25)      Subjective:      Vital Signs Last 24 Hrs  T(C): 35.8 (06 Mar 2022 16:00), Max: 35.8 (06 Mar 2022 08:59)  T(F): 96.5 (06 Mar 2022 16:00), Max: 96.5 (06 Mar 2022 16:00)  HR: 69 (07 Mar 2022 05:41) (66 - 69)  BP: 128/60 (07 Mar 2022 05:41) (112/48 - 130/62)  BP(mean): --  RR: 18 (06 Mar 2022 16:00) (18 - 18)  SpO2: 99% (06 Mar 2022 16:00) (99% - 99%)    PHYSICAL EXAM  General: adult in NAD  HEENT: clear oropharynx, anicteric sclera, pink conjunctiva  Neck: supple  CV: normal S1/S2 with no murmur rubs or gallops  Lungs: positive air movement b/l ant lungs,clear to auscultation, no wheezes, no rales  Abdomen: soft non-tender non-distended, no hepatosplenomegaly  Ext: no clubbing cyanosis or edema  Skin: no rashes and no petechiae  Neuro: alert and oriented X 4, no focal deficits    MEDICATIONS  (STANDING):  chlorhexidine 4% Liquid 1 Application(s) Topical <User Schedule>  furosemide   Injectable 40 milliGRAM(s) IV Push every 12 hours  metoprolol tartrate 25 milliGRAM(s) Oral two times a day  pantoprazole  Injectable 40 milliGRAM(s) IV Push every 12 hours    MEDICATIONS  (PRN):  acetaminophen     Tablet .. 650 milliGRAM(s) Oral every 6 hours PRN Mild Pain (1 - 3)  aluminum hydroxide/magnesium hydroxide/simethicone Suspension 30 milliLiter(s) Oral every 4 hours PRN Dyspepsia  diphenhydrAMINE 25 milliGRAM(s) Oral every 4 hours PRN Rash and/or Itching  ondansetron Injectable 4 milliGRAM(s) IV Push every 8 hours PRN Nausea and/or Vomiting      LABS:                          8.5    7.10  )-----------( 144      ( 06 Mar 2022 11:00 )             26.9         Mean Cell Volume : 89.1 fL  Mean Cell Hemoglobin : 28.1 pg  Mean Cell Hemoglobin Concentration : 31.6 g/dL  Auto Neutrophil # : x  Auto Lymphocyte # : x  Auto Monocyte # : x  Auto Eosinophil # : x  Auto Basophil # : x  Auto Neutrophil % : x  Auto Lymphocyte % : x  Auto Monocyte % : x  Auto Eosinophil % : x  Auto Basophil % : x      Serial CBC's  03-06 @ 11:00  Hct-26.9 / Hgb-8.5 / Plat-144 / RBC-3.02 / WBC-7.10  Serial CBC's  03-05 @ 22:45  Hct-25.2 / Hgb-8.0 / Plat-145 / RBC-2.83 / WBC-5.65  Serial CBC's  03-05 @ 11:09  Hct-23.5 / Hgb-7.5 / Plat-147 / RBC-2.60 / WBC-5.23  Serial CBC's  03-05 @ 01:00  Hct-23.8 / Hgb-7.4 / Plat-124 / RBC-2.58 / WBC-3.36  Serial CBC's  03-04 @ 16:00  Hct-16.0 / Hgb-4.6 / Plat-121 / RBC-1.59 / WBC-3.00  Serial CBC's  03-04 @ 12:30  Hct-15.5 / Hgb-4.5 / Plat-134 / RBC-1.55 / WBC-4.54      03-07    145  |  109  |  74<HH>  ----------------------------<  89  4.8   |  22  |  3.2<H>    Ca    8.2<L>      07 Mar 2022 06:30  Mg     2.4     03-07    TPro  5.6<L>  /  Alb  3.3<L>  /  TBili  1.1  /  DBili  x   /  AST  17  /  ALT  7   /  AlkPhos  67  03-07      Reticulocyte Percent: 5.5 % (03-05 @ 11:09)    Lactate Dehydrogenase, Serum: 268: Hemolyzed. Interpret with caution (03.05.22 @ 04:30)      Ferritin, Serum: 58 ng/mL (03-05 @ 04:30)  Folate, Serum: 6.1 ng/mL (03-05 @ 04:30)  Vitamin B12, Serum: 319 pg/mL (03-05 @ 04:30)        RADIOLOGY & ADDITIONAL STUDIES:    < from: [facility] Duplex Lower Ext Vein Scan, Bilbarbi (03.05.22 @ 11:04) >  Impression:    No evidence of deep venous thrombosis or superficial thrombophlebitis in   the bilateral lower extremities.    Peroneal vein is not visualized on the right side       Patient is a 76y old  Female who presents with a chief complaint of Low Hb (07 Mar 2022 07:25)      Subjective: Mel feels better than before, leg swelling is decreased. She was constipated since Friday but had a BM yesterday with no blood or black stools reported. Her breathing status is improved as well.       Vital Signs Last 24 Hrs  T(C): 35.8 (06 Mar 2022 16:00), Max: 35.8 (06 Mar 2022 08:59)  T(F): 96.5 (06 Mar 2022 16:00), Max: 96.5 (06 Mar 2022 16:00)  HR: 69 (07 Mar 2022 05:41) (66 - 69)  BP: 128/60 (07 Mar 2022 05:41) (112/48 - 130/62)  BP(mean): --  RR: 18 (06 Mar 2022 16:00) (18 - 18)  SpO2: 99% (06 Mar 2022 16:00) (99% - 99%)    PHYSICAL EXAM  General: NAD  HEENT: NCAT, EOMI  Neck: supple  CV: Regular rate and rhythm  Lungs: Clear to ascultation bilaterally, no respiratory distress  Abdomen: soft non-tender non-distended  Ext: bilateral edema, R > L   Skin: no rashes and no petechiae  Neuro: alert and oriented, no focal deficits    MEDICATIONS:  chlorhexidine 4% Liquid 1 Application(s) Topical <User Schedule>  furosemide   Injectable 40 milliGRAM(s) IV Push every 12 hours  metoprolol tartrate 25 milliGRAM(s) Oral two times a day  pantoprazole  Injectable 40 milliGRAM(s) IV Push every 12 hours  acetaminophen     Tablet .. 650 milliGRAM(s) Oral every 6 hours PRN Mild Pain (1 - 3)  aluminum hydroxide/magnesium hydroxide/simethicone Suspension 30 milliLiter(s) Oral every 4 hours PRN Dyspepsia  diphenhydrAMINE 25 milliGRAM(s) Oral every 4 hours PRN Rash and/or Itching  ondansetron Injectable 4 milliGRAM(s) IV Push every 8 hours PRN Nausea and/or Vomiting      LABS:                          8.5    7.10  )-----------( 144      ( 06 Mar 2022 11:00 )             26.9         Mean Cell Volume : 89.1 fL  Mean Cell Hemoglobin : 28.1 pg  Mean Cell Hemoglobin Concentration : 31.6 g/dL  Auto Neutrophil # : x  Auto Lymphocyte # : x  Auto Monocyte # : x  Auto Eosinophil # : x  Auto Basophil # : x  Auto Neutrophil % : x  Auto Lymphocyte % : x  Auto Monocyte % : x  Auto Eosinophil % : x  Auto Basophil % : x      Serial CBC's  03-06 @ 11:00  Hct-26.9 / Hgb-8.5 / Plat-144 / RBC-3.02 / WBC-7.10  Serial CBC's  03-05 @ 22:45  Hct-25.2 / Hgb-8.0 / Plat-145 / RBC-2.83 / WBC-5.65  Serial CBC's  03-05 @ 11:09  Hct-23.5 / Hgb-7.5 / Plat-147 / RBC-2.60 / WBC-5.23  Serial CBC's  03-05 @ 01:00  Hct-23.8 / Hgb-7.4 / Plat-124 / RBC-2.58 / WBC-3.36  Serial CBC's  03-04 @ 16:00  Hct-16.0 / Hgb-4.6 / Plat-121 / RBC-1.59 / WBC-3.00  Serial CBC's  03-04 @ 12:30  Hct-15.5 / Hgb-4.5 / Plat-134 / RBC-1.55 / WBC-4.54      03-07    145  |  109  |  74<HH>  ----------------------------<  89  4.8   |  22  |  3.2<H>    Ca    8.2<L>      07 Mar 2022 06:30  Mg     2.4     03-07    TPro  5.6<L>  /  Alb  3.3<L>  /  TBili  1.1  /  DBili  x   /  AST  17  /  ALT  7   /  AlkPhos  67  03-07      Reticulocyte Percent: 5.5 % (03-05 @ 11:09)    Lactate Dehydrogenase, Serum: 268: Hemolyzed. Interpret with caution (03.05.22 @ 04:30)      Ferritin, Serum: 58 ng/mL (03-05 @ 04:30)  Folate, Serum: 6.1 ng/mL (03-05 @ 04:30)  Vitamin B12, Serum: 319 pg/mL (03-05 @ 04:30)        RADIOLOGY & ADDITIONAL STUDIES:    < from: [facility] Duplex Lower Ext Vein Scan, Bilat (03.05.22 @ 11:04) >  Impression:    No evidence of deep venous thrombosis or superficial thrombophlebitis in   the bilateral lower extremities.    Peroneal vein is not visualized on the right side

## 2022-03-07 NOTE — PROVIDER CONTACT NOTE (OTHER) - SITUATION
Patient is hard stick w/ two RNs unsuccessful at placing PIV at this time. Pt requires US guide for access.

## 2022-03-07 NOTE — PROGRESS NOTE ADULT - ASSESSMENT
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 and Gastric AVM Bleeding in setting of CKD and DARRIN  #Moderate Diverticulosis   #Grade II Internal Hemorrhoids  #Erosive Gastritis  * Anemia Likely Secondary to obscure GI bleeding (from Duodenum and Gastric AVM per recent capsule endoscopy 02/2022 after presenting with melena), Fe Deficiency (TFN S% 5 in 2020 and ferritin 28 in 2022), and CKD per Dr Silver  * Receives Transfusions q1-2 weeks and Retacrit 37528 units weekly  * Recent Admission in 02/2022 for melena: s/p capsule endoscopy showing bleeding from Duodenum and Gastric AVM   * Moderate Diverticulosis and Grade II Internal Hemorrhoids on colonoscopy 10/22/2019  * Erosive Gastritis on EGD 01/09/2020  * Hemodynamically stable in ED, OLEKSANDR with brown stool, Hb 4.5 s/p 3 units pRBC and lasix IV     - GI team consulted: per previous admission and Dr Paula's risk stratification, patient at high risk for endoscopic interventions in setting of severe AS. pt would only want endoscopic intervention for overt evidence of bleeding  - s/p 4 u pRBC in total  - keep Hgb >8, active T/S  - Trend CBC closely and transfuse as needed (avoid volume overload with diuresis but keep an eye on BP in setting of severe AS)  - c/w PPI IV q12  - monitor BM  - advanced diet to soft  - LDH, retic count elevated  - f/u haptoglobin, iron Studies (give more venofer 200mg IV x3 doses as per heme/onc if consistent with DARRIN)  - give procrit 20,000 units if BP controlled  - f/u wvf antigen, activity, and multimers, r/o Heyde syndrome  - f/u TSH    #Acute HFpEF  #severe AS  * Last TTE 02/17/22 EF 74%, severe AS with A 0.8cm2 and mean P 48.9,  mild MR  Acute on Chronic HFmEF Exacerbation  * Cardiologist Dr Paula  * PE leg swelling and diffuse crackles; CXR with Bilateral perihilar and lower lobe opacities and effusions, left greater than right.  * S/P IV Lasix 40mg x1 dose in ED  * BNP 3116    - Cardiology Dr Paula consulted for discussion of TAVR/ SAVR in setting of severe AS and high risk for endoscopic procedures (patient needs a solution for GI bleeding which would only be achieved with an endoscopic intervention)  - Monitor in/out  - Daily weight (standing)  - Monitor SaO2 and O2 requirements  - Salt restricted diet and Fluid restriction to 1.2L per 24 hours  - Check Iron Studies  - c/w lasix 40mg IV q12  - cardio consult: cath and possible TAVR potentially in future once active GI bleed, anemia, and EVAN resolved  - f/u AM CXR    #EVAN on CKD4 vs progressive cKD  CKD Progression Versus Cardiorenal (volume overload) or Prerenal EVAN (slow GI bleeding)  * Baseline Cr 1.7-2.2 2022  * Recent admission with Cr 2.7-2.9 throughout  * ED BUN 60, Cr 2.7    - U/S: no hydronephrosis  - c/w diuresis for volume overload and monitor  - Diuresis as above  - monitor BMP    #Hypertension  * Home lopressor 25mg BID  * ED /44mmHg  - Monitor BP and avoid hypotension in setting of severe AS  - Resume home anti HTN with holding parameters  - Diuretics as above    #Sciatica  - PT once more euvolemic  - Pain control    #Misc:  - DVT Prophylaxis: off  - GI Prophylaxis: protonix IV BID  - Diet: soft and bite sized, 1.5L fluid restriction  - Code Status: Full  - Pending: haptoglobin, iron studies, vwf antigen, tsh, AM CXR, GI, cardio     76 year old female patient with multiple comorbidities including severe AS, HTN, CKD, sciatica, and anemia recently found to have evidence of 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 and Gastric AVM Bleeding in setting of CKD and DARRIN  #Moderate Diverticulosis   #Grade II Internal Hemorrhoids  #Erosive Gastritis  * Anemia Likely Secondary to obscure GI bleeding (from Duodenum and Gastric AVM per recent capsule endoscopy 02/2022 after presenting with melena), Fe Deficiency (TFN S% 5 in 2020 and ferritin 28 in 2022), and CKD per Dr Silver  * Receives Transfusions q1-2 weeks and Retacrit 05744 units weekly  * Recent Admission in 02/2022 for melena: s/p capsule endoscopy showing bleeding from Duodenum and Gastric AVM   * Moderate Diverticulosis and Grade II Internal Hemorrhoids on colonoscopy 10/22/2019  * Erosive Gastritis on EGD 01/09/2020  * Hemodynamically stable in ED, OLEKSANDR with brown stool, Hb 4.5 s/p 3 units pRBC and lasix IV     - GI team consulted: per previous admission and Dr Paula's risk stratification, patient at high risk for endoscopic interventions in setting of severe AS. pt would only want endoscopic intervention for overt evidence of bleeding  - s/p 4 u pRBC in total  - keep Hgb >8, active T/S  - Trend CBC closely and transfuse as needed (avoid volume overload with diuresis but keep an eye on BP in setting of severe AS)  - c/w PPI IV q12  - monitor BM  - advanced diet to soft  - LDH, retic count elevated  - retacrit 20,000unit x 1 dose 3/7 and venofer 200mg x 3 doses starting 3/7 as per heme/onc recs  - f/u haptoglobin, iron Studies  - f/u wvf antigen, activity, and multimers, r/o Heyde syndrome    #Acute HFpEF  #severe AS  * Last TTE 02/17/22 EF 74%, severe AS with A 0.8cm2 and mean P 48.9,  mild MR  Acute on Chronic HFmEF Exacerbation  * Cardiologist Dr Paula  * PE leg swelling and diffuse crackles; CXR with Bilateral perihilar and lower lobe opacities and effusions, left greater than right.  * S/P IV Lasix 40mg x1 dose in ED  * BNP 3116    - Cardiology Dr Paula consulted for discussion of TAVR/ SAVR in setting of severe AS and high risk for endoscopic procedures (patient needs a solution for GI bleeding which would only be achieved with an endoscopic intervention)  - Monitor in/out  - Daily weight (standing)  - Monitor SaO2 and O2 requirements  - Salt restricted diet and Fluid restriction to 1.2L per 24 hours  - c/w lasix 40mg IV q12  - cardio consult: cath and possible TAVR potentially in future once active GI bleed, anemia, and EVAN resolved  - Check Iron Studies, ferritin  - f/u AM CXRs    #EVAN on CKD4 vs progressive cKD  CKD Progression Versus Cardiorenal (volume overload) or Prerenal EVAN (slow GI bleeding)  * Baseline Cr 1.7-2.2 2022  * Recent admission with Cr 2.7-2.9 throughout  * ED BUN 60, Cr 2.7    - U/S: no hydronephrosis  - c/w diuresis for volume overload and monitor  - Cr stable  - Diuresis as above  - monitor BMP    #Hypertension  * Home lopressor 25mg BID  * ED /44mmHg  - Monitor BP and avoid hypotension in setting of severe AS  - Resume home anti HTN with holding parameters  - Diuretics as above    #Sciatica  - PT once more euvolemic  - Pain control    #Misc:  - DVT Prophylaxis: off  - GI Prophylaxis: protonix IV BID  - Diet: soft and bite sized, 1.2L fluid restriction  - Code Status: Full  - Pending: haptoglobin, iron studies, vwf antigen, AM CXR, GI, cardio, heme/onc follow ups     76 year old female patient with multiple comorbidities including severe AS, HTN, CKD, sciatica, and anemia recently found to have evidence of 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 and Gastric AVM Bleeding in setting of CKD and DARRIN  #Moderate Diverticulosis   #Grade II Internal Hemorrhoids  #Erosive Gastritis  * Anemia Likely Secondary to obscure GI bleeding (from Duodenum and Gastric AVM per recent capsule endoscopy 02/2022 after presenting with melena), Fe Deficiency (TFN S% 5 in 2020 and ferritin 28 in 2022), and CKD per Dr Silver  * Receives Transfusions q1-2 weeks and Retacrit 37419 units weekly  * Recent Admission in 02/2022 for melena: s/p capsule endoscopy showing bleeding from Duodenum and Gastric AVM   * Moderate Diverticulosis and Grade II Internal Hemorrhoids on colonoscopy 10/22/2019  * Erosive Gastritis on EGD 01/09/2020  * Hemodynamically stable in ED, OLEKSANDR with brown stool, Hb 4.5 s/p 3 units pRBC and lasix IV     - GI team consulted: per previous admission and Dr Paula's risk stratification, patient at high risk for endoscopic interventions in setting of severe AS. pt would only want endoscopic intervention for overt evidence of bleeding  - s/p 4 u pRBC in total  - keep Hgb >8, active T/S  - Trend CBC closely and transfuse as needed (avoid volume overload with diuresis but keep an eye on BP in setting of severe AS)  - c/w PPI IV q12  - monitor BM  - advanced diet to soft  - LDH, retic count elevated  - retacrit 20,000unit x 1 dose 3/7 and venofer 200mg x 3 doses starting 3/7 as per heme/onc recs  - f/u haptoglobin, iron Studies  - f/u wvf antigen, activity, and multimers, r/o Heyde syndrome  - conservative management for now    #Acute HFpEF  #severe AS  * Last TTE 02/17/22 EF 74%, severe AS with A 0.8cm2 and mean P 48.9,  mild MR  Acute on Chronic HFmEF Exacerbation  * Cardiologist Dr Paula  * PE leg swelling and diffuse crackles; CXR with Bilateral perihilar and lower lobe opacities and effusions, left greater than right.  * S/P IV Lasix 40mg x1 dose in ED  * BNP 3116    - Cardiology Dr Paula consulted for discussion of TAVR/ SAVR in setting of severe AS and high risk for endoscopic procedures (patient needs a solution for GI bleeding which would only be achieved with an endoscopic intervention)  - Monitor in/out  - Daily weight (standing)  - Monitor SaO2 and O2 requirements  - Salt restricted diet and Fluid restriction to 1.2L per 24 hours  - c/w lasix 40mg IV q12  - cardio consult: cath and possible TAVR potentially in future once active GI bleed, anemia, and EVAN resolved  - Check Iron Studies, ferritin  - f/u AM CXRs  - will need OP cardio f/u    #EVAN on CKD4 vs progressive cKD  CKD Progression Versus Cardiorenal (volume overload) or Prerenal EVAN (slow GI bleeding)  * Baseline Cr 1.7-2.2 2022  * Recent admission with Cr 2.7-2.9 throughout  * ED BUN 60, Cr 2.7    - U/S: no hydronephrosis  - c/w diuresis for volume overload and monitor  - Cr stable  - Diuresis as above  - monitor BMP    #Hypertension  * Home lopressor 25mg BID  * ED /44mmHg  - Monitor BP and avoid hypotension in setting of severe AS  - Resume home anti HTN with holding parameters  - Diuretics as above    #Sciatica  - PT once more euvolemic  - Pain control    #Misc:  - DVT Prophylaxis: off  - GI Prophylaxis: protonix IV BID  - Diet: soft and bite sized, 1.2L fluid restriction  - Code Status: Full  - Dispo: anticipate dc back to [facility] wednesday. 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 and Gastric AVM Bleeding in setting of CKD and DARRIN  #Moderate Diverticulosis   #Grade II Internal Hemorrhoids  #Erosive Gastritis  * Anemia Likely Secondary to obscure GI bleeding (from Duodenum and Gastric AVM per recent capsule endoscopy 02/2022 after presenting with melena), Fe Deficiency (TFN S% 5 in 2020 and ferritin 28 in 2022), and CKD per Dr Silver  * Receives Transfusions q1-2 weeks and Retacrit 81080 units weekly  * Recent Admission in 02/2022 for melena: s/p capsule endoscopy showing bleeding from Duodenum and Gastric AVM   * Moderate Diverticulosis and Grade II Internal Hemorrhoids on colonoscopy 10/22/2019  * Erosive Gastritis on EGD 01/09/2020  * Hemodynamically stable in ED, OLEKSANDR with brown stool, Hb 4.5 s/p 3 units pRBC and lasix IV     - GI team consulted: per previous admission and Dr Paula's risk stratification, patient at high risk for endoscopic interventions in setting of severe AS. pt would only want endoscopic intervention for overt evidence of bleeding  - s/p 4 u pRBC in total  - keep Hgb >8, active T/S  - Trend CBC closely and transfuse as needed (avoid volume overload with diuresis but keep an eye on BP in setting of severe AS)  - c/w PPI IV q12  - monitor BM  - advanced diet to soft  - LDH, retic count elevated  - retacrit 20,000unit x 1 dose 3/7 and venofer 200mg x 3 doses starting 3/7 as per heme/onc recs  - f/u haptoglobin, iron Studies  - f/u wvf antigen, activity, and multimers, r/o Heyde syndrome  - conservative management for now    #Acute HFpEF  #severe AS  * Last TTE 02/17/22 EF 74%, severe AS with A 0.8cm2 and mean P 48.9,  mild MR  Acute on Chronic HFmEF Exacerbation  * Cardiologist Dr Paula  * PE leg swelling and diffuse crackles; CXR with Bilateral perihilar and lower lobe opacities and effusions, left greater than right.  * S/P IV Lasix 40mg x1 dose in ED  * BNP 3116    - Cardiology Dr Paula consulted for discussion of TAVR/ SAVR in setting of severe AS and high risk for endoscopic procedures (patient needs a solution for GI bleeding which would only be achieved with an endoscopic intervention)  - Monitor in/out  - Daily weight (standing)  - Monitor SaO2 and O2 requirements  - Salt restricted diet and Fluid restriction to 1.2L per 24 hours  - c/w lasix 40mg IV q12  - cardio consult: cath and possible TAVR potentially in future once active GI bleed, anemia, and EVAN resolved  - Check Iron Studies, ferritin  - f/u AM CXRs    #EVAN on CKD4 vs progressive cKD  CKD Progression Versus Cardiorenal (volume overload) or Prerenal EVAN (slow GI bleeding)  * Baseline Cr 1.7-2.2 2022  * Recent admission with Cr 2.7-2.9 throughout  * ED BUN 60, Cr 2.7    - U/S: no hydronephrosis  - c/w diuresis for volume overload and monitor  - Cr stable  - Diuresis as above  - monitor BMP    #Hypertension  * Home lopressor 25mg BID  * ED /44mmHg  - Monitor BP and avoid hypotension in setting of severe AS  - Resume home anti HTN with holding parameters  - Diuretics as above    #Sciatica  - PT once more euvolemic  - Pain control    #Misc:  - DVT Prophylaxis: off  - GI Prophylaxis: protonix IV BID  - Diet: soft and bite sized, 1.2L fluid restriction  - Code Status: Full  - Dispo: anticipate dc back to [facility] wednesday. PT eval  - Pending: haptoglobin, iron studies, vwf antigen, AM CXR, heme/onc follow up

## 2022-03-08 LAB
ALBUMIN SERPL ELPH-MCNC: 3.5 G/DL — SIGNIFICANT CHANGE UP (ref 3.5–5.2)
ALP SERPL-CCNC: 72 U/L — SIGNIFICANT CHANGE UP (ref 30–115)
ALT FLD-CCNC: 7 U/L — SIGNIFICANT CHANGE UP (ref 0–41)
ANION GAP SERPL CALC-SCNC: 10 MMOL/L — SIGNIFICANT CHANGE UP (ref 7–14)
AST SERPL-CCNC: 13 U/L — SIGNIFICANT CHANGE UP (ref 0–41)
BASOPHILS # BLD AUTO: 0.01 K/UL — SIGNIFICANT CHANGE UP (ref 0–0.2)
BASOPHILS NFR BLD AUTO: 0.2 % — SIGNIFICANT CHANGE UP (ref 0–1)
BILIRUB SERPL-MCNC: 1.1 MG/DL — SIGNIFICANT CHANGE UP (ref 0.2–1.2)
BUN SERPL-MCNC: 74 MG/DL — CRITICAL HIGH (ref 10–20)
CALCIUM SERPL-MCNC: 8.3 MG/DL — LOW (ref 8.5–10.1)
CHLORIDE SERPL-SCNC: 108 MMOL/L — SIGNIFICANT CHANGE UP (ref 98–110)
CO2 SERPL-SCNC: 29 MMOL/L — SIGNIFICANT CHANGE UP (ref 17–32)
CREAT SERPL-MCNC: 3.3 MG/DL — HIGH (ref 0.7–1.5)
EGFR: 14 ML/MIN/1.73M2 — LOW
EOSINOPHIL # BLD AUTO: 0.84 K/UL — HIGH (ref 0–0.7)
EOSINOPHIL NFR BLD AUTO: 12.9 % — HIGH (ref 0–8)
FERRITIN SERPL-MCNC: 41 NG/ML — SIGNIFICANT CHANGE UP (ref 15–150)
GLUCOSE SERPL-MCNC: 91 MG/DL — SIGNIFICANT CHANGE UP (ref 70–99)
HAPTOGLOB SERPL-MCNC: 94 MG/DL — SIGNIFICANT CHANGE UP (ref 34–200)
HCT VFR BLD CALC: 28.7 % — LOW (ref 37–47)
HGB BLD-MCNC: 8.7 G/DL — LOW (ref 12–16)
IMM GRANULOCYTES NFR BLD AUTO: 0.6 % — HIGH (ref 0.1–0.3)
LYMPHOCYTES # BLD AUTO: 0.69 K/UL — LOW (ref 1.2–3.4)
LYMPHOCYTES # BLD AUTO: 10.6 % — LOW (ref 20.5–51.1)
MAGNESIUM SERPL-MCNC: 2.3 MG/DL — SIGNIFICANT CHANGE UP (ref 1.8–2.4)
MCHC RBC-ENTMCNC: 28.2 PG — SIGNIFICANT CHANGE UP (ref 27–31)
MCHC RBC-ENTMCNC: 30.3 G/DL — LOW (ref 32–37)
MCV RBC AUTO: 93.2 FL — SIGNIFICANT CHANGE UP (ref 81–99)
MONOCYTES # BLD AUTO: 0.53 K/UL — SIGNIFICANT CHANGE UP (ref 0.1–0.6)
MONOCYTES NFR BLD AUTO: 8.2 % — SIGNIFICANT CHANGE UP (ref 1.7–9.3)
NEUTROPHILS # BLD AUTO: 4.38 K/UL — SIGNIFICANT CHANGE UP (ref 1.4–6.5)
NEUTROPHILS NFR BLD AUTO: 67.5 % — SIGNIFICANT CHANGE UP (ref 42.2–75.2)
NRBC # BLD: 0 /100 WBCS — SIGNIFICANT CHANGE UP (ref 0–0)
PLATELET # BLD AUTO: 172 K/UL — SIGNIFICANT CHANGE UP (ref 130–400)
POTASSIUM SERPL-MCNC: 4.1 MMOL/L — SIGNIFICANT CHANGE UP (ref 3.5–5)
POTASSIUM SERPL-SCNC: 4.1 MMOL/L — SIGNIFICANT CHANGE UP (ref 3.5–5)
PROT SERPL-MCNC: 5.8 G/DL — LOW (ref 6–8)
RBC # BLD: 3.08 M/UL — LOW (ref 4.2–5.4)
RBC # FLD: 19.5 % — HIGH (ref 11.5–14.5)
SODIUM SERPL-SCNC: 147 MMOL/L — HIGH (ref 135–146)
VWF AG ACT/NOR PPP IA: 235 % — HIGH (ref 63–170)
WBC # BLD: 6.49 K/UL — SIGNIFICANT CHANGE UP (ref 4.8–10.8)
WBC # FLD AUTO: 6.49 K/UL — SIGNIFICANT CHANGE UP (ref 4.8–10.8)

## 2022-03-08 PROCEDURE — 99233 SBSQ HOSP IP/OBS HIGH 50: CPT

## 2022-03-08 RX ORDER — FUROSEMIDE 40 MG
40 TABLET ORAL DAILY
Refills: 0 | Status: DISCONTINUED | OUTPATIENT
Start: 2022-03-09 | End: 2022-03-11

## 2022-03-08 RX ORDER — DIPHENHYDRAMINE HCL 50 MG
25 CAPSULE ORAL ONCE
Refills: 0 | Status: COMPLETED | OUTPATIENT
Start: 2022-03-08 | End: 2022-03-08

## 2022-03-08 RX ADMIN — Medication 25 MILLIGRAM(S): at 17:04

## 2022-03-08 RX ADMIN — PANTOPRAZOLE SODIUM 40 MILLIGRAM(S): 20 TABLET, DELAYED RELEASE ORAL at 17:04

## 2022-03-08 RX ADMIN — Medication 25 MILLIGRAM(S): at 06:41

## 2022-03-08 RX ADMIN — PANTOPRAZOLE SODIUM 40 MILLIGRAM(S): 20 TABLET, DELAYED RELEASE ORAL at 06:42

## 2022-03-08 RX ADMIN — IRON SUCROSE 110 MILLIGRAM(S): 20 INJECTION, SOLUTION INTRAVENOUS at 11:09

## 2022-03-08 RX ADMIN — Medication 20 MILLIGRAM(S): at 13:14

## 2022-03-08 RX ADMIN — Medication 25 MILLIGRAM(S): at 06:46

## 2022-03-08 RX ADMIN — Medication 40 MILLIGRAM(S): at 06:42

## 2022-03-08 NOTE — PHYSICAL THERAPY INITIAL EVALUATION ADULT - GENERAL OBSERVATIONS, REHAB EVAL
13:05-13:22 Pt encountered semifowler in bed in NAD. Pt instructed in BLE therapeutic exercise in bed. Will f/u for PT.
11:15-11:45 Pt encountered semifowler in bed in NAD. Agreeable for PT. + O2, + IV (Iron)

## 2022-03-08 NOTE — PHYSICAL THERAPY INITIAL EVALUATION ADULT - SPECIFY REASON(S)
Attempted to see pt for PT Initial Evaluation, pt declined 2* to received Benadryl and is still sleepy. Pt educated on goals for today , pt in agreement later in a.m. Will f/u for PT.
Pt declined PT at this time 2* to fatigue, requesting for PT to come back tomorrow. Pt educated about importance of mobility, pt declined even sitting in b/s chair. Will f/u for PT.

## 2022-03-08 NOTE — PHYSICAL THERAPY INITIAL EVALUATION ADULT - LIVES WITH, PROFILE
Pt admitted from [facility] for STR
Pt admitted from [facility] - there for short term rehab

## 2022-03-08 NOTE — PROGRESS NOTE ADULT - ASSESSMENT
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 In the ED, she was hemodynamically stable with a Hgb of 4.5 and received 3 units of PRBC. Workup also showed acute CHF and she was placed on IV diuresis.    1. Anemia - acute over chronic   likely due to recurrent GI bleed in setting of CKD and iron deficiency anemia  had GI workup recently including capsule endoscopy 2/22 that showed duodenum and gastric AVMs  EGD/colonoscopy: erosive gastritis, moderate diverticulosis, grade 2 internal hemorrhoids  this admission: OLEKSANDR with brown stool and Hgb 4.5  s/p 3 units PRBC -> Hgb 7.5 - another Unit given -> 8 - >8.5  goal Hgb >8   check CBC daily  keep active T&S  PPI IV q12hr  tolerating soft diet   GI and HemOnc evals appreciated  per GI consult, pt and son want to hold off on endoscopic intervention unless active bleeding with hemodynamic instability  repeat iron studies reviewed  ferritin 58 so venofer ordered for 3 days   retacrit 92867 U x 1 on 3/7  on blood transfusions q1-2 weeks and weekly retacrit 77288 units as outpt    2. Acute HFpEF / severe AS  improving on lasix 40mg IV q12hr for volume overload   daily wts and I's and O's, fluid restriction 1.5L/day, low sodium diet  monitor BMP, Mg  avoid overdiuresis   cardio consult appreciated: cath and possible TAVR potentially in future once active GI bleed, anemia, and EVAN have resolved     3. EVAN over CKD 4 vs progressive CKD  Baseline Cr 1.7-2.2 in early 2022  Recent admission with Cr 2.7-2.9   no hydronephrosis on US in Feb 2022  possibly due to CRS  Cr now 3.3  reduce dose of lasix and repeat renal US  daily BMP  renal consult Dr. Sosa if not improving    4. HTN on metoprolol - BP acceptable    5. Hyperkalemia - resolved  daily BMP  lokelma if needed for K 5.5 or higher    6. Morbid obesity BMI 46.3  dietary eval    7. DVT prophylaxis - SCD  venous duplex of LE negative for DVT    8. Thrombocytopenia - improving  f/u pending labs     9. Rash - appears allergic - pt states that she usually has premedication before transfusions  on benadryl prn  s/p dose of prednisone 3/6 and repeat dose today  monitor response      PROGRESS NOTE HANDOFF    Pending: renal/bladder US, labs in AM, continue PT    pt aware of the plan of care and she states she will update her family    Disposition: [facility]

## 2022-03-08 NOTE — PROGRESS NOTE ADULT - ASSESSMENT
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 and Gastric AVM Bleeding in setting of CKD and DARRIN  #Moderate Diverticulosis   #Grade II Internal Hemorrhoids  #Erosive Gastritis  * Anemia Likely Secondary to obscure GI bleeding (from Duodenum and Gastric AVM per recent capsule endoscopy 02/2022 after presenting with melena), Fe Deficiency (TFN S% 5 in 2020 and ferritin 28 in 2022), and CKD per Dr Silver  * Receives Transfusions q1-2 weeks and Retacrit 64382 units weekly  * Recent Admission in 02/2022 for melena: s/p capsule endoscopy showing bleeding from Duodenum and Gastric AVM   * Moderate Diverticulosis and Grade II Internal Hemorrhoids on colonoscopy 10/22/2019  * Erosive Gastritis on EGD 01/09/2020  * Hemodynamically stable in ED, OLEKSANDR with brown stool, Hb 4.5 s/p 3 units pRBC and lasix IV     - GI team consulted: per previous admission and Dr Paula's risk stratification, patient at high risk for endoscopic interventions in setting of severe AS. pt would only want endoscopic intervention for overt evidence of bleeding  - s/p 4 u pRBC in total  - keep Hgb >8, active T/S  - Trend CBC closely and transfuse as needed (avoid volume overload with diuresis but keep an eye on BP in setting of severe AS)  - c/w PPI IV q12  - monitor BM  - advanced diet to soft  - LDH, retic count elevated  - retacrit 20,000unit x 1 dose 3/7 and venofer 200mg x 3 doses starting 3/7 as per heme/onc recs  - f/u haptoglobin, iron Studies  - f/u wvf antigen, activity, and multimers, r/o Heyde syndrome  - conservative management for now    #Acute HFpEF  #severe AS  * Last TTE 02/17/22 EF 74%, severe AS with A 0.8cm2 and mean P 48.9,  mild MR  Acute on Chronic HFmEF Exacerbation  * Cardiologist Dr Paula  * PE leg swelling and diffuse crackles; CXR with Bilateral perihilar and lower lobe opacities and effusions, left greater than right.  * S/P IV Lasix 40mg x1 dose in ED  * BNP 3116    - Cardiology Dr Paula consulted for discussion of TAVR/ SAVR in setting of severe AS and high risk for endoscopic procedures (patient needs a solution for GI bleeding which would only be achieved with an endoscopic intervention)  - Monitor in/out  - Daily weight (standing)  - Monitor SaO2 and O2 requirements  - Salt restricted diet and Fluid restriction to 1.2L per 24 hours  - c/w lasix 40mg IV q12  - cardio consult: cath and possible TAVR potentially in future once active GI bleed, anemia, and EVAN resolved  - Check Iron Studies, ferritin  - will need OP cardio f/u    #EVAN on CKD4 vs progressive CKD- likely CRS  CKD Progression Versus Cardiorenal (volume overload) or Prerenal EVAN (slow GI bleeding)  * Baseline Cr 1.7-2.2 2022  * Recent admission with Cr 2.7-2.9 throughout  * ED BUN 60, Cr 2.7    - U/S: no hydronephrosis  - c/w diuresis for volume overload and monitor  - Cr stable  - c/w Diuresis as above  - monitor BMP    #Hypertension  * Home lopressor 25mg BID  * ED /44mmHg  - Monitor BP and avoid hypotension in setting of severe AS  - Resume home anti HTN with holding parameters  - Diuretics as above    #Thrombocytopenia- improving    #Sciatica  - PT once more euvolemic  - Pain control    #Misc:  - DVT Prophylaxis: off  - GI Prophylaxis: protonix IV BID  - Diet: soft and bite sized, 1.2L fluid restriction  - Code Status: Full  - Dispo: anticipate dc back to [facility] wednesday. 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 and Gastric AVM Bleeding in setting of CKD and DARRIN  #Moderate Diverticulosis   #Grade II Internal Hemorrhoids  #Erosive Gastritis  * Anemia Likely Secondary to obscure GI bleeding (from Duodenum and Gastric AVM per recent capsule endoscopy 02/2022 after presenting with melena), Fe Deficiency (TFN S% 5 in 2020 and ferritin 28 in 2022), and CKD per Dr Silver  * Receives Transfusions q1-2 weeks and Retacrit 29300 units weekly  * Recent Admission in 02/2022 for melena: s/p capsule endoscopy showing bleeding from Duodenum and Gastric AVM   * Moderate Diverticulosis and Grade II Internal Hemorrhoids on colonoscopy 10/22/2019  * Erosive Gastritis on EGD 01/09/2020  * Hemodynamically stable in ED, OLEKSANDR with brown stool, Hb 4.5 s/p 3 units pRBC and lasix IV     - GI team consulted: per previous admission and Dr Paula's risk stratification, patient at high risk for endoscopic interventions in setting of severe AS. pt would only want endoscopic intervention for overt evidence of bleeding  - s/p 4 u pRBC in total  - keep Hgb >8, active T/S  - Trend CBC closely and transfuse as needed (avoid volume overload with diuresis but keep an eye on BP in setting of severe AS)  - c/w PPI IV q12  - monitor BM  - advanced diet to soft  - LDH, retic count elevated  - retacrit 20,000unit x 1 dose 3/7 and venofer 200mg x 3 doses starting 3/7 as per heme/onc recs  - f/u haptoglobin, iron Studies  - f/u wvf antigen, activity, and multimers, r/o Heyde syndrome  - conservative management for now    #Acute HFpEF  #severe AS  * Last TTE 02/17/22 EF 74%, severe AS with A 0.8cm2 and mean P 48.9,  mild MR  Acute on Chronic HFmEF Exacerbation  * Cardiologist Dr Paula  * PE leg swelling and diffuse crackles; CXR with Bilateral perihilar and lower lobe opacities and effusions, left greater than right.  * S/P IV Lasix 40mg x1 dose in ED  * BNP 3116    - Cardiology Dr Paula consulted for discussion of TAVR/ SAVR in setting of severe AS and high risk for endoscopic procedures (patient needs a solution for GI bleeding which would only be achieved with an endoscopic intervention)  - Monitor in/out  - Daily weight (standing)  - Monitor SaO2 and O2 requirements  - Salt restricted diet and Fluid restriction to 1.2L per 24 hours  - c/w lasix 40mg IV q12  - cardio consult: cath and possible TAVR potentially in future once active GI bleed, anemia, and EVAN resolved  - Check Iron Studies, ferritin  - will need OP cardio f/u    #EVAN on CKD4 vs progressive CKD- likely CRS  CKD Progression Versus Cardiorenal (volume overload) or Prerenal EVAN (slow GI bleeding)  * Baseline Cr 1.7-2.2 2022  * Recent admission with Cr 2.7-2.9 throughout  * ED BUN 60, Cr 2.7    - U/S: no hydronephrosis  - c/w diuresis for volume overload and monitor  - Cr stable  - c/w Diuresis as above. spoke with RN staff at [facility]. pt was not on any diuretics there  - monitor BMP  - f/u renal/bladder U/S, r/o obstruction  - renal consult if not improving    #Hypertension  * Home lopressor 25mg BID  * ED /44mmHg  - Monitor BP and avoid hypotension in setting of severe AS  - Resume home anti HTN with holding parameters  - Diuretics as above    #Thrombocytopenia- improving    #Sciatica  - PT once more euvolemic  - Pain control    #Rash-possibly allergic  - s/p prednisone 20mg x2 doses 3/6 and 3/8  - pt reports that she always gets premedicated before getting blood transfusions    #Misc:  - DVT Prophylaxis: off  - GI Prophylaxis: protonix IV BID  - Diet: soft and bite sized, 1.2L fluid restriction  - Code Status: Full  - Dispo: [facility]  - Pending: haptoglobin, iron studies, vwf antigen, heme/onc follow up, renal bladder U/S, Cr   76 year old female patient with multiple comorbidities including severe AS, HTN, CKD, sciatica, and anemia recently found to have evidence of 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 and Gastric AVM Bleeding in setting of CKD and DARRIN  #Moderate Diverticulosis   #Grade II Internal Hemorrhoids  #Erosive Gastritis  * Anemia Likely Secondary to obscure GI bleeding (from Duodenum and Gastric AVM per recent capsule endoscopy 02/2022 after presenting with melena), Fe Deficiency (TFN S% 5 in 2020 and ferritin 28 in 2022), and CKD per Dr Silver  * Receives Transfusions q1-2 weeks and Retacrit 90481 units weekly  * Recent Admission in 02/2022 for melena: s/p capsule endoscopy showing bleeding from Duodenum and Gastric AVM   * Moderate Diverticulosis and Grade II Internal Hemorrhoids on colonoscopy 10/22/2019  * Erosive Gastritis on EGD 01/09/2020  * Hemodynamically stable in ED, OLEKSANDR with brown stool, Hb 4.5 s/p 3 units pRBC and lasix IV     - GI team consulted: per previous admission and Dr Paula's risk stratification, patient at high risk for endoscopic interventions in setting of severe AS. pt would only want endoscopic intervention for overt evidence of bleeding  - s/p 4 u pRBC in total  - keep Hgb >8, active T/S  - Trend CBC closely and transfuse as needed (avoid volume overload with diuresis but keep an eye on BP in setting of severe AS)  - c/w PPI IV q12  - monitor BM  - advanced diet to soft  - LDH, retic count elevated  - retacrit 20,000unit x 1 dose 3/7 and venofer 200mg x 3 doses starting 3/7 as per heme/onc recs  - f/u haptoglobin, iron Studies  - f/u wvf antigen, activity, and multimers, r/o Heyde syndrome  - conservative management for now    #Acute HFpEF  #severe AS  * Last TTE 02/17/22 EF 74%, severe AS with A 0.8cm2 and mean P 48.9,  mild MR  Acute on Chronic HFmEF Exacerbation  * Cardiologist Dr Paula  * PE leg swelling and diffuse crackles; CXR with Bilateral perihilar and lower lobe opacities and effusions, left greater than right.  * S/P IV Lasix 40mg x1 dose in ED  * BNP 3116    - Cardiology Dr Paula consulted for discussion of TAVR/ SAVR in setting of severe AS and high risk for endoscopic procedures (patient needs a solution for GI bleeding which would only be achieved with an endoscopic intervention)  - Monitor in/out  - Daily weight (standing)  - Monitor SaO2 and O2 requirements  - Salt restricted diet and Fluid restriction to 1.2L per 24 hours  - c/w lasix 40mg IV q12  - cardio consult: cath and possible TAVR potentially in future once active GI bleed, anemia, and EVAN resolved  - Check Iron Studies, ferritin    #EVAN on CKD4 vs progressive CKD- likely CRS  CKD Progression Versus Cardiorenal (volume overload) or Prerenal EVAN (slow GI bleeding)  * Baseline Cr 1.7-2.2 2022  * Recent admission with Cr 2.7-2.9 throughout  * ED BUN 60, Cr 2.7    - U/S: no hydronephrosis  - c/w diuresis for volume overload and monitor  - Cr stable  - c/w Diuresis as above. spoke with RN staff at [facility]. pt was not on any diuretics there  - monitor BMP  - f/u renal/bladder U/S, r/o obstruction  - renal consult if not improving    #Hypertension  * Home lopressor 25mg BID  * ED /44mmHg  - Monitor BP and avoid hypotension in setting of severe AS  - Resume home anti HTN with holding parameters  - Diuretics as above    #Thrombocytopenia- improving    #Sciatica  - PT once more euvolemic  - Pain control    #Rash-possibly allergic  - s/p prednisone 20mg x2 doses 3/6 and 3/8  - pt reports that she always gets premedicated before getting blood transfusions    #Misc:  - DVT Prophylaxis: off  - GI Prophylaxis: protonix IV BID  - Diet: soft and bite sized, 1.2L fluid restriction  - Code Status: Full  - Dispo: [facility]  - Pending: haptoglobin, iron studies, vwf antigen, heme/onc follow up, renal bladder U/S, Cr

## 2022-03-08 NOTE — PROGRESS NOTE ADULT - SUBJECTIVE AND OBJECTIVE BOX
LATRICIA ARREAGA  76y Female    INTERVAL HPI/OVERNIGHT EVENTS:    Pt still c/o itching and has rash of chest, arms and back  less SOB  denies pain, N/V    T(F): 98 (03-08-22 @ 08:09), Max: 98.1 (03-07-22 @ 23:50)  HR: 65 (03-08-22 @ 08:09) (65 - 99)  BP: 117/58 (03-08-22 @ 08:09) (113/57 - 123/57)  RR: 18 (03-08-22 @ 08:09) (18 - 18)  SpO2: 98% (03-08-22 @ 08:09) (98% - 100%) on 2L NC      PHYSICAL EXAM:  GENERAL: NAD  HEAD:  Normocephalic  EYES:  conjunctiva and sclera clear  ENMT: Moist mucous membranes  NERVOUS SYSTEM:  Alert, awake, Good concentration  CHEST/LUNG: decreased BS b/l  HEART: Regular rate and rhythm; 3/6 KATHYA  ABDOMEN: Soft, Nontender, Nondistended; Bowel sounds present  EXTREMITIES: + LE edema (right 1+, left mild)  SKIN: erythematous maculopapular rash with blanching of chest, back and arms    Consultant(s) Notes Reviewed:  [x ] YES  [ ] NO  Care Discussed with Consultants/Other Providers [ x] YES  [ ] NO    MEDICATIONS  (STANDING):  chlorhexidine 4% Liquid 1 Application(s) Topical <User Schedule>  furosemide   Injectable 40 milliGRAM(s) IV Push every 12 hours  iron sucrose IVPB 200 milliGRAM(s) IV Intermittent every 24 hours  metoprolol tartrate 25 milliGRAM(s) Oral two times a day  pantoprazole  Injectable 40 milliGRAM(s) IV Push every 12 hours    MEDICATIONS  (PRN):  acetaminophen     Tablet .. 650 milliGRAM(s) Oral every 6 hours PRN Mild Pain (1 - 3)  aluminum hydroxide/magnesium hydroxide/simethicone Suspension 30 milliLiter(s) Oral every 4 hours PRN Dyspepsia  diphenhydrAMINE 25 milliGRAM(s) Oral every 4 hours PRN Rash and/or Itching  ondansetron Injectable 4 milliGRAM(s) IV Push every 8 hours PRN Nausea and/or Vomiting      LABS:                        8.7    6.49  )-----------( 172      ( 08 Mar 2022 08:39 )             28.7     03-08    147<H>  |  108  |  74<HH>  ----------------------------<  91  4.1   |  29  |  3.3<H>    Ca    8.3<L>      08 Mar 2022 08:39  Mg     2.3     03-08    TPro  5.8<L>  /  Alb  3.5  /  TBili  1.1  /  DBili  x   /  AST  13  /  ALT  7   /  AlkPhos  72  03-08            Case discussed with resident and RN today    Care discussed with pt

## 2022-03-08 NOTE — PROGRESS NOTE ADULT - SUBJECTIVE AND OBJECTIVE BOX
Patient is a 76y old  Female who presents with a chief complaint of Low Hb (07 Mar 2022 11:36)    INTERVAL HPI/OVERNIGHT EVENTS:   No overnight events   Afebrile, hemodynamically stable     Vital Signs Last 24 Hrs  T(C): 36.7 (07 Mar 2022 23:50), Max: 36.7 (07 Mar 2022 23:50)  T(F): 98.1 (07 Mar 2022 23:50), Max: 98.1 (07 Mar 2022 23:50)  HR: 99 (07 Mar 2022 23:50) (66 - 99)  BP: 113/57 (07 Mar 2022 23:50) (113/57 - 123/57)  BP(mean): --  ABP: --  ABP(mean): --  RR: 18 (07 Mar 2022 23:50) (18 - 19)  SpO2: 100% (07 Mar 2022 15:30) (87% - 100%)    I&O's Summary        LABS:                        7.8    5.66  )-----------( 140      ( 07 Mar 2022 20:00 )             26.1     03-07    145  |  109  |  74<HH>  ----------------------------<  89  4.8   |  22  |  3.2<H>    Ca    8.2<L>      07 Mar 2022 06:30  Mg     2.4     03-07    TPro  5.6<L>  /  Alb  3.3<L>  /  TBili  1.1  /  DBili  x   /  AST  17  /  ALT  7   /  AlkPhos  67  03-07        CAPILLARY BLOOD GLUCOSE            RADIOLOGY & ADDITIONAL TESTS:    Consultant(s) Notes Reviewed:  [x ] YES  [ ] NO    MEDICATIONS  (STANDING):  chlorhexidine 4% Liquid 1 Application(s) Topical <User Schedule>  furosemide   Injectable 40 milliGRAM(s) IV Push every 12 hours  iron sucrose IVPB 200 milliGRAM(s) IV Intermittent every 24 hours  metoprolol tartrate 25 milliGRAM(s) Oral two times a day  pantoprazole  Injectable 40 milliGRAM(s) IV Push every 12 hours    MEDICATIONS  (PRN):  acetaminophen     Tablet .. 650 milliGRAM(s) Oral every 6 hours PRN Mild Pain (1 - 3)  aluminum hydroxide/magnesium hydroxide/simethicone Suspension 30 milliLiter(s) Oral every 4 hours PRN Dyspepsia  diphenhydrAMINE 25 milliGRAM(s) Oral every 4 hours PRN Rash and/or Itching  ondansetron Injectable 4 milliGRAM(s) IV Push every 8 hours PRN Nausea and/or Vomiting      PHYSICAL EXAM:  GENERAL: in NAD  HEENT: atraumatic  Cardiac: 4/6 systolic murmur  Respiratory: bilateral decreased breath sounds. R rales  Abdomen: no tenderness to palpation. bowel sounds present  Extremities: bilateral LE edema  Skin: no rashes or lesions Patient is a 76y old  Female who presents with a chief complaint of Low Hb (07 Mar 2022 11:36)    INTERVAL HPI/OVERNIGHT EVENTS:   No overnight events   Afebrile, hemodynamically stable   Reports still feeling a little itchy on back and arms    Vital Signs Last 24 Hrs  T(C): 36.7 (07 Mar 2022 23:50), Max: 36.7 (07 Mar 2022 23:50)  T(F): 98.1 (07 Mar 2022 23:50), Max: 98.1 (07 Mar 2022 23:50)  HR: 99 (07 Mar 2022 23:50) (66 - 99)  BP: 113/57 (07 Mar 2022 23:50) (113/57 - 123/57)  BP(mean): --  ABP: --  ABP(mean): --  RR: 18 (07 Mar 2022 23:50) (18 - 19)  SpO2: 100% (07 Mar 2022 15:30) (87% - 100%)    I&O's Summary        LABS:                        7.8    5.66  )-----------( 140      ( 07 Mar 2022 20:00 )             26.1     03-07    145  |  109  |  74<HH>  ----------------------------<  89  4.8   |  22  |  3.2<H>    Ca    8.2<L>      07 Mar 2022 06:30  Mg     2.4     03-07    TPro  5.6<L>  /  Alb  3.3<L>  /  TBili  1.1  /  DBili  x   /  AST  17  /  ALT  7   /  AlkPhos  67  03-07        CAPILLARY BLOOD GLUCOSE            RADIOLOGY & ADDITIONAL TESTS:    Consultant(s) Notes Reviewed:  [x ] YES  [ ] NO    MEDICATIONS  (STANDING):  chlorhexidine 4% Liquid 1 Application(s) Topical <User Schedule>  furosemide   Injectable 40 milliGRAM(s) IV Push every 12 hours  iron sucrose IVPB 200 milliGRAM(s) IV Intermittent every 24 hours  metoprolol tartrate 25 milliGRAM(s) Oral two times a day  pantoprazole  Injectable 40 milliGRAM(s) IV Push every 12 hours    MEDICATIONS  (PRN):  acetaminophen     Tablet .. 650 milliGRAM(s) Oral every 6 hours PRN Mild Pain (1 - 3)  aluminum hydroxide/magnesium hydroxide/simethicone Suspension 30 milliLiter(s) Oral every 4 hours PRN Dyspepsia  diphenhydrAMINE 25 milliGRAM(s) Oral every 4 hours PRN Rash and/or Itching  ondansetron Injectable 4 milliGRAM(s) IV Push every 8 hours PRN Nausea and/or Vomiting      PHYSICAL EXAM:  GENERAL: in NAD  HEENT: atraumatic  Cardiac: 4/6 systolic murmur  Respiratory: bilateral decreased basilar breath sounds  Abdomen: no tenderness to palpation. bowel sounds present  Extremities: bilateral LE edema  Skin: no rashes or lesions Patient is a 76y old  Female who presents with a chief complaint of Low Hb (07 Mar 2022 11:36)    INTERVAL HPI/OVERNIGHT EVENTS:   No overnight events   Afebrile, hemodynamically stable   Reports still feeling a little itchy on back and arms    Vital Signs Last 24 Hrs  T(C): 36.7 (07 Mar 2022 23:50), Max: 36.7 (07 Mar 2022 23:50)  T(F): 98.1 (07 Mar 2022 23:50), Max: 98.1 (07 Mar 2022 23:50)  HR: 99 (07 Mar 2022 23:50) (66 - 99)  BP: 113/57 (07 Mar 2022 23:50) (113/57 - 123/57)  BP(mean): --  ABP: --  ABP(mean): --  RR: 18 (07 Mar 2022 23:50) (18 - 19)  SpO2: 100% (07 Mar 2022 15:30) (87% - 100%)    I&O's Summary        LABS:                        7.8    5.66  )-----------( 140      ( 07 Mar 2022 20:00 )             26.1     03-07    145  |  109  |  74<HH>  ----------------------------<  89  4.8   |  22  |  3.2<H>    Ca    8.2<L>      07 Mar 2022 06:30  Mg     2.4     03-07    TPro  5.6<L>  /  Alb  3.3<L>  /  TBili  1.1  /  DBili  x   /  AST  17  /  ALT  7   /  AlkPhos  67  03-07        CAPILLARY BLOOD GLUCOSE            RADIOLOGY & ADDITIONAL TESTS:    Consultant(s) Notes Reviewed:  [x ] YES  [ ] NO    MEDICATIONS  (STANDING):  chlorhexidine 4% Liquid 1 Application(s) Topical <User Schedule>  furosemide   Injectable 40 milliGRAM(s) IV Push every 12 hours  iron sucrose IVPB 200 milliGRAM(s) IV Intermittent every 24 hours  metoprolol tartrate 25 milliGRAM(s) Oral two times a day  pantoprazole  Injectable 40 milliGRAM(s) IV Push every 12 hours    MEDICATIONS  (PRN):  acetaminophen     Tablet .. 650 milliGRAM(s) Oral every 6 hours PRN Mild Pain (1 - 3)  aluminum hydroxide/magnesium hydroxide/simethicone Suspension 30 milliLiter(s) Oral every 4 hours PRN Dyspepsia  diphenhydrAMINE 25 milliGRAM(s) Oral every 4 hours PRN Rash and/or Itching  ondansetron Injectable 4 milliGRAM(s) IV Push every 8 hours PRN Nausea and/or Vomiting      PHYSICAL EXAM:  GENERAL: in NAD  HEENT: atraumatic  Cardiac: 3/6 systolic murmur  Respiratory: bilateral decreased basilar breath sounds  Abdomen: no tenderness to palpation. bowel sounds present  Extremities: bilateral LE edema  Skin: no rashes or lesions

## 2022-03-09 LAB
ALBUMIN SERPL ELPH-MCNC: 3.3 G/DL — LOW (ref 3.5–5.2)
ALP SERPL-CCNC: 64 U/L — SIGNIFICANT CHANGE UP (ref 30–115)
ALT FLD-CCNC: 7 U/L — SIGNIFICANT CHANGE UP (ref 0–41)
ANION GAP SERPL CALC-SCNC: 11 MMOL/L — SIGNIFICANT CHANGE UP (ref 7–14)
AST SERPL-CCNC: 11 U/L — SIGNIFICANT CHANGE UP (ref 0–41)
BASOPHILS # BLD AUTO: 0.01 K/UL — SIGNIFICANT CHANGE UP (ref 0–0.2)
BASOPHILS NFR BLD AUTO: 0.2 % — SIGNIFICANT CHANGE UP (ref 0–1)
BILIRUB SERPL-MCNC: 0.8 MG/DL — SIGNIFICANT CHANGE UP (ref 0.2–1.2)
BLD GP AB SCN SERPL QL: SIGNIFICANT CHANGE UP
BUN SERPL-MCNC: 69 MG/DL — CRITICAL HIGH (ref 10–20)
CALCIUM SERPL-MCNC: 8.4 MG/DL — LOW (ref 8.5–10.1)
CHLORIDE SERPL-SCNC: 108 MMOL/L — SIGNIFICANT CHANGE UP (ref 98–110)
CO2 SERPL-SCNC: 28 MMOL/L — SIGNIFICANT CHANGE UP (ref 17–32)
CREAT SERPL-MCNC: 3.3 MG/DL — HIGH (ref 0.7–1.5)
EGFR: 14 ML/MIN/1.73M2 — LOW
EOSINOPHIL # BLD AUTO: 0.38 K/UL — SIGNIFICANT CHANGE UP (ref 0–0.7)
EOSINOPHIL NFR BLD AUTO: 8.3 % — HIGH (ref 0–8)
GLUCOSE SERPL-MCNC: 100 MG/DL — HIGH (ref 70–99)
HCT VFR BLD CALC: 25.9 % — LOW (ref 37–47)
HGB BLD-MCNC: 7.8 G/DL — LOW (ref 12–16)
IMM GRANULOCYTES NFR BLD AUTO: 0.2 % — SIGNIFICANT CHANGE UP (ref 0.1–0.3)
LYMPHOCYTES # BLD AUTO: 0.46 K/UL — LOW (ref 1.2–3.4)
LYMPHOCYTES # BLD AUTO: 10 % — LOW (ref 20.5–51.1)
MAGNESIUM SERPL-MCNC: 2.1 MG/DL — SIGNIFICANT CHANGE UP (ref 1.8–2.4)
MCHC RBC-ENTMCNC: 27.8 PG — SIGNIFICANT CHANGE UP (ref 27–31)
MCHC RBC-ENTMCNC: 30.1 G/DL — LOW (ref 32–37)
MCV RBC AUTO: 92.2 FL — SIGNIFICANT CHANGE UP (ref 81–99)
MONOCYTES # BLD AUTO: 0.27 K/UL — SIGNIFICANT CHANGE UP (ref 0.1–0.6)
MONOCYTES NFR BLD AUTO: 5.9 % — SIGNIFICANT CHANGE UP (ref 1.7–9.3)
NEUTROPHILS # BLD AUTO: 3.46 K/UL — SIGNIFICANT CHANGE UP (ref 1.4–6.5)
NEUTROPHILS NFR BLD AUTO: 75.4 % — HIGH (ref 42.2–75.2)
NRBC # BLD: 0 /100 WBCS — SIGNIFICANT CHANGE UP (ref 0–0)
PLATELET # BLD AUTO: 136 K/UL — SIGNIFICANT CHANGE UP (ref 130–400)
POTASSIUM SERPL-MCNC: 4.5 MMOL/L — SIGNIFICANT CHANGE UP (ref 3.5–5)
POTASSIUM SERPL-SCNC: 4.5 MMOL/L — SIGNIFICANT CHANGE UP (ref 3.5–5)
PROT SERPL-MCNC: 5.6 G/DL — LOW (ref 6–8)
RBC # BLD: 2.81 M/UL — LOW (ref 4.2–5.4)
RBC # FLD: 18.7 % — HIGH (ref 11.5–14.5)
SODIUM SERPL-SCNC: 147 MMOL/L — HIGH (ref 135–146)
VWF:RCO ACT/NOR PPP PL AGG: 188 % — HIGH (ref 40–120)
WBC # BLD: 4.59 K/UL — LOW (ref 4.8–10.8)
WBC # FLD AUTO: 4.59 K/UL — LOW (ref 4.8–10.8)

## 2022-03-09 PROCEDURE — 99233 SBSQ HOSP IP/OBS HIGH 50: CPT

## 2022-03-09 PROCEDURE — 76770 US EXAM ABDO BACK WALL COMP: CPT | Mod: 26

## 2022-03-09 RX ADMIN — IRON SUCROSE 110 MILLIGRAM(S): 20 INJECTION, SOLUTION INTRAVENOUS at 11:17

## 2022-03-09 RX ADMIN — Medication 40 MILLIGRAM(S): at 06:24

## 2022-03-09 RX ADMIN — Medication 25 MILLIGRAM(S): at 13:35

## 2022-03-09 RX ADMIN — PANTOPRAZOLE SODIUM 40 MILLIGRAM(S): 20 TABLET, DELAYED RELEASE ORAL at 17:07

## 2022-03-09 RX ADMIN — Medication 25 MILLIGRAM(S): at 00:15

## 2022-03-09 RX ADMIN — PANTOPRAZOLE SODIUM 40 MILLIGRAM(S): 20 TABLET, DELAYED RELEASE ORAL at 06:23

## 2022-03-09 RX ADMIN — Medication 25 MILLIGRAM(S): at 23:20

## 2022-03-09 RX ADMIN — Medication 25 MILLIGRAM(S): at 17:07

## 2022-03-09 RX ADMIN — Medication 25 MILLIGRAM(S): at 06:23

## 2022-03-09 RX ADMIN — CHLORHEXIDINE GLUCONATE 1 APPLICATION(S): 213 SOLUTION TOPICAL at 06:46

## 2022-03-09 NOTE — PROGRESS NOTE ADULT - TIME BILLING
Patient was seen independently. Latest vital signs and labs were reviewed. Case was discussed with house staff. Agree with resident's assessment and plan with following additions/modifications.     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       Acute on chronic anemia with transfusion dependence  Prior h/o  gastric AVM  bleeding  Severe AS  Possible DARRIN  EVAN over CKD 4 vs progressive CKD  Morbid obesity BMI 46.3      s/p 3 units PRBC . Hb today 8.6  H/H currently stable  GI-As per family wishes holding off on scope unless clinical deterioration  Possible DARRIN- Venofer x 3 days, Received retacrit [id number] U x 1 on 3/7  Severe AS-TAVR deferred for now once active GI bleed, anemia, and EVAN have resolve   EVAN over CKD 4 vs progressive CKD-possible CRS. Get HF consult   Continue lasix 40 mg IV for now  Continue current medical management for active chronic comorbid conditions.  DVT Prophylaxis as appropriate  Supportive care including activity, diet, goals of care discussed in AM rounds.  Discussed with  / in AM rounds  Handoff: Disposition: [facility] when stable, not stable for discharge yet in lieu of worsening renal function Patient was seen independently. Latest vital signs and labs were reviewed. Case was discussed with house staff. Agree with resident's assessment and plan with following additions/modifications.     ASSESSMENT & PLAN:  77 y/o woman with PMH of severe AS, HTN, CKD 4, sciatica and anemia recently found to have evidence of bleeding from gastric AVM and duodenum on capsule endoscopy and currently on q1-2 weekly transfusions and retacrit weekly now presented to the ED on 03/04 for evaluation of low hemoglobin on outpatient labs.       Acute on chronic anemia with transfusion dependence  Prior h/o  gastric AVM  bleeding  Acute HFpEF / severe AS  Possible DARRIN  EVAN over CKD 4 vs progressive CKD  Morbid obesity BMI 46.3      s/p 3 units PRBC . Hb today 8.6  H/H currently stable  GI-As per family wishes holding off on scope unless clinical deterioration  Possible DARRIN- Venofer x 3 days, Received retacrit [id number] U x 1 on 3/7  Severe AS-TAVR deferred for now once active GI bleed, anemia, and EVAN have resolve   EVAN over CKD 4 vs progressive CKD-possible CRS. Get HF consult   Acute HFpEF / severe AS. Continue lasix 40 mg IV for now  Continue current medical management for active chronic comorbid conditions.  DVT Prophylaxis as appropriate  Supportive care including activity, diet, goals of care discussed in AM rounds.  Discussed with  / in AM rounds  Handoff: Disposition: [facility] when stable, not stable for discharge yet in lieu of worsening renal function

## 2022-03-09 NOTE — PROGRESS NOTE ADULT - SUBJECTIVE AND OBJECTIVE BOX
Patient is a 76y old  Female who presents with a chief complaint of Low Hb (08 Mar 2022 14:35)    INTERVAL HPI/OVERNIGHT EVENTS:   No overnight events   Afebrile, hemodynamically stable     Vital Signs Last 24 Hrs  T(C): 35.6 (09 Mar 2022 00:03), Max: 37.1 (08 Mar 2022 15:48)  T(F): 96 (09 Mar 2022 00:03), Max: 98.8 (08 Mar 2022 15:48)  HR: 68 (09 Mar 2022 00:03) (65 - 69)  BP: 128/57 (09 Mar 2022 00:03) (117/58 - 128/57)  BP(mean): --  ABP: --  ABP(mean): --  RR: 18 (09 Mar 2022 00:03) (18 - 18)  SpO2: 98% (08 Mar 2022 15:48) (98% - 98%)    I&O's Summary    08 Mar 2022 07:01  -  09 Mar 2022 07:00  --------------------------------------------------------  IN: 550 mL / OUT: 0 mL / NET: 550 mL          LABS:                        8.7    6.49  )-----------( 172      ( 08 Mar 2022 08:39 )             28.7     03-08    147<H>  |  108  |  74<HH>  ----------------------------<  91  4.1   |  29  |  3.3<H>    Ca    8.3<L>      08 Mar 2022 08:39  Mg     2.3     03-08    TPro  5.8<L>  /  Alb  3.5  /  TBili  1.1  /  DBili  x   /  AST  13  /  ALT  7   /  AlkPhos  72  03-08        CAPILLARY BLOOD GLUCOSE            RADIOLOGY & ADDITIONAL TESTS:    Consultant(s) Notes Reviewed:  [x ] YES  [ ] NO    MEDICATIONS  (STANDING):  chlorhexidine 4% Liquid 1 Application(s) Topical <User Schedule>  furosemide   Injectable 40 milliGRAM(s) IV Push daily  iron sucrose IVPB 200 milliGRAM(s) IV Intermittent every 24 hours  metoprolol tartrate 25 milliGRAM(s) Oral two times a day  pantoprazole  Injectable 40 milliGRAM(s) IV Push every 12 hours    MEDICATIONS  (PRN):  acetaminophen     Tablet .. 650 milliGRAM(s) Oral every 6 hours PRN Mild Pain (1 - 3)  aluminum hydroxide/magnesium hydroxide/simethicone Suspension 30 milliLiter(s) Oral every 4 hours PRN Dyspepsia  diphenhydrAMINE 25 milliGRAM(s) Oral every 4 hours PRN Rash and/or Itching  ondansetron Injectable 4 milliGRAM(s) IV Push every 8 hours PRN Nausea and/or Vomiting      PHYSICAL EXAM:  GENERAL: in NAD  HEENT: atraumatic  Cardiac: 4/6 systolic murmur  Respiratory: bilateral decreased basilar breath sounds  Abdomen: no tenderness to palpation. bowel sounds present  Extremities: bilateral LE edema  Skin: no rashes or lesions   Patient is a 76y old  Female who presents with a chief complaint of Low Hb (08 Mar 2022 14:35)    INTERVAL HPI/OVERNIGHT EVENTS:   No overnight events   Afebrile, hemodynamically stable     Vital Signs Last 24 Hrs  T(C): 35.6 (09 Mar 2022 00:03), Max: 37.1 (08 Mar 2022 15:48)  T(F): 96 (09 Mar 2022 00:03), Max: 98.8 (08 Mar 2022 15:48)  HR: 68 (09 Mar 2022 00:03) (65 - 69)  BP: 128/57 (09 Mar 2022 00:03) (117/58 - 128/57)  BP(mean): --  ABP: --  ABP(mean): --  RR: 18 (09 Mar 2022 00:03) (18 - 18)  SpO2: 98% (08 Mar 2022 15:48) (98% - 98%)    I&O's Summary    08 Mar 2022 07:01  -  09 Mar 2022 07:00  --------------------------------------------------------  IN: 550 mL / OUT: 0 mL / NET: 550 mL          LABS:                        8.7    6.49  )-----------( 172      ( 08 Mar 2022 08:39 )             28.7     03-08    147<H>  |  108  |  74<HH>  ----------------------------<  91  4.1   |  29  |  3.3<H>    Ca    8.3<L>      08 Mar 2022 08:39  Mg     2.3     03-08    TPro  5.8<L>  /  Alb  3.5  /  TBili  1.1  /  DBili  x   /  AST  13  /  ALT  7   /  AlkPhos  72  03-08        CAPILLARY BLOOD GLUCOSE            RADIOLOGY & ADDITIONAL TESTS:    Consultant(s) Notes Reviewed:  [x ] YES  [ ] NO    MEDICATIONS  (STANDING):  chlorhexidine 4% Liquid 1 Application(s) Topical <User Schedule>  furosemide   Injectable 40 milliGRAM(s) IV Push daily  iron sucrose IVPB 200 milliGRAM(s) IV Intermittent every 24 hours  metoprolol tartrate 25 milliGRAM(s) Oral two times a day  pantoprazole  Injectable 40 milliGRAM(s) IV Push every 12 hours    MEDICATIONS  (PRN):  acetaminophen     Tablet .. 650 milliGRAM(s) Oral every 6 hours PRN Mild Pain (1 - 3)  aluminum hydroxide/magnesium hydroxide/simethicone Suspension 30 milliLiter(s) Oral every 4 hours PRN Dyspepsia  diphenhydrAMINE 25 milliGRAM(s) Oral every 4 hours PRN Rash and/or Itching  ondansetron Injectable 4 milliGRAM(s) IV Push every 8 hours PRN Nausea and/or Vomiting      PHYSICAL EXAM:  GENERAL: in NAD  HEENT: atraumatic  Cardiac: 3/6 systolic murmur  Respiratory: bilateral decreased basilar breath sounds  Abdomen: no tenderness to palpation. bowel sounds present  Extremities: bilateral LE edema  Skin: no rashes or lesions

## 2022-03-09 NOTE — CHART NOTE - NSCHARTNOTEFT_GEN_A_CORE
Heme/Onc is following the patient for acute on chronic anemia.    Ms. Marroquin is a 76 year old (ex smoker) female patient known to have severe AS (TTE 22 EF 74%, severe AS with A 0.8cm2 and mean P 48.9,  mild MR), anemia Likely Secondary to obscure GI bleeding (from Duodenum and Gastric AVM per recent capsule endoscopy 2022 after presenting with melena), Fe Deficiency (TFN S% 5 in  and ferritin 28 in ), and CKD (baseline 1.7-2.2) , gets transfusions every 1-2 weeks and Retacrit 98192 units weekly, erosive gastritis presents for hemoglobin 4.5 g/dL on outpatient labs. Patient also informed that in her facility a few weeks ago she did have black stools.                          7.8    4.59  )-----------( 136      ( 09 Mar 2022 06:25 )             25.9     03-09    147<H>  |  108  |  69<HH>  ----------------------------<  100<H>  4.5   |  28  |  3.3<H>    Ca    8.4<L>      09 Mar 2022 06:25  Mg     2.1     -09    TPro  5.6<L>  /  Alb  3.3<L>  /  TBili  0.8  /  DBili  x   /  AST  11  /  ALT  7   /  AlkPhos  64  03-09      s/p 4U PRBC this admission (3U on 3/4 and 1U on 3/5)    s/p GI and Cardio evaluation on admission.     Anemia workup so far:    Retic count: 5.5%,  (hemolyzed), Haptoglobin, Serum: 94 mg/dL (22 @ 12:22)    Iron - Total Binding Capacity.: 294 ug/dL  % Saturation, Iron: 15 %  Iron Total, Serum: 43 ug/dL  Unsaturated Iron Binding Capacity: 251 ug/dL (22 @ 12:22)  Ferritin 58    Folate 6.1, Vit B12 319     vWF activity was 198%     Protein Electrophoresis, Serum (22 @ 05:43)    Protein Total, Serum: 6.0 g/dL    Total Protein, Serum: 6.0 g/dL    Albumin, Serum: 3.1 g/dL    Alpha 1: 0.4 g/dL    Alpha 2: 0.6 g/dL    Beta Globulin: 0.7 g/dL    Gamma Globulin: 1.2 g/dL    M-Sean: Unable to Quantitate    % Albumin: 51.3 %    % Alpha 1: 6.5 %    % Alpha 2: 9.7 %    % Beta: 12.1 %    % Gamma: 20.4 %    % M Sean: Unable to Quantitate    Albumin/Globulin Ratio: 1.1 Ratio    Serum Protein Electrophoresis Interp: Weak Gamma-Migrating Paraprotein Identified    Immunoglobulins Panel (22 @ 05:43)    Quantitative Ig mg/dL    Quantitative IgA: 222 mg/dL    Quantitative IgM: 148 mg/dL    FABY Kappa: 11.98 mg/dL    FABY Lambda: 7.77 mg/dL    [place]/Lambda Free Light Chain Ratio, Serum: 1.54 Ratio    Immunofixation, Serum:   Weak IgG Lambda Band Identified (22 @ 05:43)    She received Procrit 16411H on 3/7/22 and started 3 doses of venofer 200mg daily on 3/7.    RECOMMENDATIONS:    -We will continue with Procrit 20,000 units every other week as long as BP is controlled  -As repeat ferritin is under 100 (58 on 3/5/22) and iron saturation less than 20% (15% on3/7) , can administer Venofer 200 mg IV for 3 doses (started on 3/7)  -Can transfuse to keep hemoglobin greater than 7-8 depending on symptoms  -If hemoglobin of 10 is needed and patient continues to bleed it may not be possible to achieve this goal for the TAVR, thus we can administer a unit a day as long as the cardiology team feels patient will be able to tolerate this order to achieve the goal for her procedure  -Her von Willebrand activity was 198, F/u antigen and multimer.   -Strongly recommend GI and cardiology follow up   -Kidney function also worsening: Nephrology follow up recommended.

## 2022-03-09 NOTE — PROGRESS NOTE ADULT - ASSESSMENT
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 and Gastric AVM Bleeding in setting of CKD and DARRIN  #Moderate Diverticulosis   #Grade II Internal Hemorrhoids  #Erosive Gastritis  * Anemia Likely Secondary to obscure GI bleeding (from Duodenum and Gastric AVM per recent capsule endoscopy 02/2022 after presenting with melena), Fe Deficiency (TFN S% 5 in 2020 and ferritin 28 in 2022), and CKD per Dr Silver  * Receives Transfusions q1-2 weeks and Retacrit 56352 units weekly  * Recent Admission in 02/2022 for melena: s/p capsule endoscopy showing bleeding from Duodenum and Gastric AVM   * Moderate Diverticulosis and Grade II Internal Hemorrhoids on colonoscopy 10/22/2019  * Erosive Gastritis on EGD 01/09/2020  * Hemodynamically stable in ED, OLEKSANDR with brown stool, Hb 4.5 s/p 3 units pRBC and lasix IV     - GI team consulted: per previous admission and Dr Paula's risk stratification, patient at high risk for endoscopic interventions in setting of severe AS. pt would only want endoscopic intervention for overt evidence of bleeding  - s/p 4 u pRBC in total  - keep Hgb >8, active T/S  - Trend CBC closely and transfuse as needed (avoid volume overload with diuresis but keep an eye on BP in setting of severe AS)  - c/w PPI IV q12  - monitor BM  - advanced diet to soft  - LDH, retic count elevated  - retacrit 20,000unit x 1 dose 3/7 and venofer 200mg x 3 doses starting 3/7 as per heme/onc recs  - f/u haptoglobin  - f/u wvf antigen (elevated), activity, and multimers, r/o Heyde syndrome  - conservative management for now    #Acute HFpEF  #severe AS  * Last TTE 02/17/22 EF 74%, severe AS with A 0.8cm2 and mean P 48.9,  mild MR  Acute on Chronic HFmEF Exacerbation  * Cardiologist Dr Paula  * PE leg swelling and diffuse crackles; CXR with Bilateral perihilar and lower lobe opacities and effusions, left greater than right.  * S/P IV Lasix 40mg x1 dose in ED  * BNP 3116    - Cardiology Dr Paula consulted for discussion of TAVR/ SAVR in setting of severe AS and high risk for endoscopic procedures (patient needs a solution for GI bleeding which would only be achieved with an endoscopic intervention)  - Monitor in/out  - Daily weight (standing)  - Monitor SaO2 and O2 requirements  - Salt restricted diet and Fluid restriction to 1.2L per 24 hours  - cardio consult: cath and possible TAVR potentially in future once active GI bleed, anemia, and EVAN resolved  - lasix 40mg IV q12 -> changed to IV lasix 40mg 3/8  - will need OP cardio f/u    #EVAN on CKD4 vs progressive CKD- likely CRS  CKD Progression Versus Cardiorenal (volume overload) or Prerenal EVAN (slow GI bleeding)  * Baseline Cr 1.7-2.2 2022  * Recent admission with Cr 2.7-2.9 throughout  * ED BUN 60, Cr 2.7    - U/S: no hydronephrosis  - c/w diuresis for volume overload and monitor  - Cr stable  - c/w Diuresis as above. spoke with RN staff at [facility]. pt was not on any diuretics there  - monitor BMP  - f/u renal/bladder U/S, r/o obstruction  - renal consult if Cr not improving this AM    #Hypertension  * Home lopressor 25mg BID  * ED /44mmHg  - Monitor BP and avoid hypotension in setting of severe AS  - Resume home anti HTN with holding parameters  - Diuretics as above    #Thrombocytopenia- improving    #Sciatica  - PT once more euvolemic  - Pain control    #Rash-possibly allergic  - s/p prednisone 20mg x2 doses 3/6 and 3/8  - pt reports that she always gets premedicated before getting blood transfusions    #Misc:  - DVT Prophylaxis: off  - GI Prophylaxis: protonix IV BID  - Diet: soft and bite sized, 1.2L fluid restriction  - Code Status: Full  - Dispo: [facility]  - Pending: haptoglobin, vwf studies, heme/onc follow up, renal bladder U/S, Cr, possible nephro consult 76 year old female patient with multiple comorbidities including severe AS, HTN, CKD, sciatica, and anemia recently found to have evidence of 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 and Gastric AVM Bleeding in setting of CKD and DARRIN  #Moderate Diverticulosis   #Grade II Internal Hemorrhoids  #Erosive Gastritis  * Anemia Likely Secondary to obscure GI bleeding (from Duodenum and Gastric AVM per recent capsule endoscopy 02/2022 after presenting with melena), Fe Deficiency (TFN S% 5 in 2020 and ferritin 28 in 2022), and CKD per Dr Silver  * Receives Transfusions q1-2 weeks and Retacrit 74830 units weekly  * Recent Admission in 02/2022 for melena: s/p capsule endoscopy showing bleeding from Duodenum and Gastric AVM   * Moderate Diverticulosis and Grade II Internal Hemorrhoids on colonoscopy 10/22/2019  * Erosive Gastritis on EGD 01/09/2020  * Hemodynamically stable in ED, OLEKSANDR with brown stool, Hb 4.5 s/p 3 units pRBC and lasix IV     - GI team consulted: per previous admission and Dr Paula's risk stratification, patient at high risk for endoscopic interventions in setting of severe AS. pt would only want endoscopic intervention for overt evidence of bleeding  - s/p 4 u pRBC in total  - keep Hgb >8, active T/S  - Trend CBC closely and transfuse as needed (avoid volume overload with diuresis but keep an eye on BP in setting of severe AS)  - c/w PPI IV q12  - monitor BM  - advanced diet to soft  - LDH, retic count elevated  - retacrit 20,000unit x 1 dose 3/7 and venofer 200mg x 3 doses starting 3/7 as per heme/onc recs  - f/u haptoglobin  - f/u wvf antigen 198, vwf activity, and vf multimers, r/o Heyde syndrome  - conservative management for now as per GI no intervention unless overt bleeding    #Acute HFpEF  #severe AS  * Last TTE 02/17/22 EF 74%, severe AS with A 0.8cm2 and mean P 48.9,  mild MR  Acute on Chronic HFmEF Exacerbation  * Cardiologist Dr Paula  * PE leg swelling and diffuse crackles; CXR with Bilateral perihilar and lower lobe opacities and effusions, left greater than right.  * S/P IV Lasix 40mg x1 dose in ED  * BNP 3116    - Cardiology Dr Paula consulted for discussion of TAVR/ SAVR in setting of severe AS and high risk for endoscopic procedures (patient needs a solution for GI bleeding which would only be achieved with an endoscopic intervention)  - Monitor in/out  - Daily weight (standing)  - Monitor SaO2 and O2 requirements  - Salt restricted diet and Fluid restriction to 1.2L per 24 hours  - cardio consult: cath and possible TAVR potentially in future once active GI bleed, anemia, and EVAN resolved  - lasix 40mg IV q12 -> changed to IV lasix 40mg 3/8  - f/u HF CONSULT    #EVAN on CKD4 vs progressive CKD- likely CRS- stable  CKD Progression Versus Cardiorenal (volume overload) or Prerenal EVAN (slow GI bleeding)  * Baseline Cr 1.7-2.2 2022  * Recent admission with Cr 2.7-2.9 throughout  * ED BUN 60, Cr 2.7    - U/S: no hydronephrosis  - c/w diuresis for volume overload and monitor  - Cr stable  - c/w Diuresis as above. spoke with RN staff at [facility]. pt was not on any diuretics there  - monitor BMP  - f/u renal/bladder U/S, r/o obstruction  - f/u renal consult    #Hypertension  * Home lopressor 25mg BID  * ED /44mmHg  - Monitor BP and avoid hypotension in setting of severe AS  - Resume home anti HTN with holding parameters  - Diuretics as above    #Thrombocytopenia- improving    #Sciatica  - PT once more euvolemic  - Pain control    #Rash-possibly allergic  - s/p prednisone 20mg x2 doses 3/6 and 3/8  - pt reports that she always gets premedicated before getting blood transfusions    #Misc:  - DVT Prophylaxis: SCDs  - GI Prophylaxis: protonix IV BID  - Diet: soft and bite sized, 1.2L fluid restriction  - Code Status: Full  - Dispo: [facility]  - Pending: haptoglobin, vwf studies, heme/onc follow up, renal bladder U/S, Cr, nephro f/u, HF consult 76 year old female patient with multiple comorbidities including severe AS, HTN, CKD, sciatica, and anemia recently found to have evidence of 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 and Gastric AVM Bleeding in setting of CKD and DARRIN  #Moderate Diverticulosis   #Grade II Internal Hemorrhoids  #Erosive Gastritis  * Anemia Likely Secondary to obscure GI bleeding (from Duodenum and Gastric AVM per recent capsule endoscopy 02/2022 after presenting with melena), Fe Deficiency (TFN S% 5 in 2020 and ferritin 28 in 2022), and CKD per Dr Silver  * Receives Transfusions q1-2 weeks and Retacrit 22590 units weekly  * Recent Admission in 02/2022 for melena: s/p capsule endoscopy showing bleeding from Duodenum and Gastric AVM   * Moderate Diverticulosis and Grade II Internal Hemorrhoids on colonoscopy 10/22/2019  * Erosive Gastritis on EGD 01/09/2020  * Hemodynamically stable in ED, OLEKSANDR with brown stool, Hb 4.5 s/p 3 units pRBC and lasix IV     - GI team consulted: per previous admission and Dr Paula's risk stratification, patient at high risk for endoscopic interventions in setting of severe AS. pt would only want endoscopic intervention for overt evidence of bleeding  - s/p 4 u pRBC in total  - keep Hgb >8, active T/S  - Trend CBC closely and transfuse as needed (avoid volume overload with diuresis but keep an eye on BP in setting of severe AS)  - c/w PPI IV q12  - monitor BM  - advanced diet to soft  - LDH, retic count elevated  - retacrit 20,000unit x 1 dose 3/7 and venofer 200mg x 3 doses starting 3/7 as per heme/onc recs  - f/u haptoglobin  - f/u wvf antigen 198, vwf activity, and vf multimers, r/o Heyde syndrome  - conservative management for now as per GI no intervention unless overt bleeding    #Acute HFpEF  #severe AS  * Last TTE 02/17/22 EF 74%, severe AS with A 0.8cm2 and mean P 48.9,  mild MR  Acute on Chronic HFmEF Exacerbation  * Cardiologist Dr Paula  * PE leg swelling and diffuse crackles; CXR with Bilateral perihilar and lower lobe opacities and effusions, left greater than right.  * S/P IV Lasix 40mg x1 dose in ED  * BNP 3116    - Cardiology Dr Paula consulted for discussion of TAVR/ SAVR in setting of severe AS and high risk for endoscopic procedures (patient needs a solution for GI bleeding which would only be achieved with an endoscopic intervention)  - Monitor in/out  - Daily weight (standing)  - Monitor SaO2 and O2 requirements  - Salt restricted diet and Fluid restriction to 1.2L per 24 hours  - cardio consult: cath and possible TAVR potentially in future once active GI bleed, anemia, and EVAN resolved  - lasix 40mg IV q12 -> changed to IV lasix 40mg 3/8  - discussed with cardiology fellow, pt will require medical optimization before cardio can work her up for potential cath/TAVR in the future  - f/u HF CONSULT    #EVAN on CKD4 vs progressive CKD- likely CRS- stable  CKD Progression Versus Cardiorenal (volume overload) or Prerenal EVAN (slow GI bleeding)  * Baseline Cr 1.7-2.2 2022  * Recent admission with Cr 2.7-2.9 throughout  * ED BUN 60, Cr 2.7    - U/S: no hydronephrosis  - c/w diuresis for volume overload and monitor  - Cr stable  - c/w Diuresis as above. spoke with RN staff at [facility]. pt was not on any diuretics there  - monitor BMP  - f/u renal/bladder U/S, r/o obstruction  - f/u renal consult    #Hypertension  * Home lopressor 25mg BID  * ED /44mmHg  - Monitor BP and avoid hypotension in setting of severe AS  - Resume home anti HTN with holding parameters  - Diuretics as above    #Thrombocytopenia- improving    #Sciatica  - PT once more euvolemic  - Pain control    #Rash-possibly allergic  - s/p prednisone 20mg x2 doses 3/6 and 3/8  - pt reports that she always gets premedicated before getting blood transfusions    #Misc:  - DVT Prophylaxis: SCDs  - GI Prophylaxis: protonix IV BID  - Diet: soft and bite sized, 1.2L fluid restriction  - Code Status: Full  - Dispo: [facility]  - Pending: haptoglobin, vwf studies, heme/onc follow up, renal bladder U/S, Cr, nephro f/u, HF consult

## 2022-03-10 LAB
ALBUMIN SERPL ELPH-MCNC: 3.3 G/DL — LOW (ref 3.5–5.2)
ALP SERPL-CCNC: 64 U/L — SIGNIFICANT CHANGE UP (ref 30–115)
ALT FLD-CCNC: 7 U/L — SIGNIFICANT CHANGE UP (ref 0–41)
ANION GAP SERPL CALC-SCNC: 11 MMOL/L — SIGNIFICANT CHANGE UP (ref 7–14)
APPEARANCE UR: CLEAR — SIGNIFICANT CHANGE UP
AST SERPL-CCNC: 14 U/L — SIGNIFICANT CHANGE UP (ref 0–41)
BILIRUB SERPL-MCNC: 0.8 MG/DL — SIGNIFICANT CHANGE UP (ref 0.2–1.2)
BILIRUB UR-MCNC: NEGATIVE — SIGNIFICANT CHANGE UP
BUN SERPL-MCNC: 68 MG/DL — CRITICAL HIGH (ref 10–20)
CALCIUM SERPL-MCNC: 8.5 MG/DL — SIGNIFICANT CHANGE UP (ref 8.5–10.1)
CHLORIDE SERPL-SCNC: 109 MMOL/L — SIGNIFICANT CHANGE UP (ref 98–110)
CO2 SERPL-SCNC: 26 MMOL/L — SIGNIFICANT CHANGE UP (ref 17–32)
COLOR SPEC: SIGNIFICANT CHANGE UP
CREAT SERPL-MCNC: 3.1 MG/DL — HIGH (ref 0.7–1.5)
DIFF PNL FLD: NEGATIVE — SIGNIFICANT CHANGE UP
EGFR: 15 ML/MIN/1.73M2 — LOW
GLUCOSE BLDC GLUCOMTR-MCNC: 90 MG/DL — SIGNIFICANT CHANGE UP (ref 70–99)
GLUCOSE SERPL-MCNC: 81 MG/DL — SIGNIFICANT CHANGE UP (ref 70–99)
GLUCOSE UR QL: NEGATIVE — SIGNIFICANT CHANGE UP
HCT VFR BLD CALC: 28.5 % — LOW (ref 37–47)
HGB BLD-MCNC: 8.4 G/DL — LOW (ref 12–16)
KETONES UR-MCNC: NEGATIVE — SIGNIFICANT CHANGE UP
LEUKOCYTE ESTERASE UR-ACNC: NEGATIVE — SIGNIFICANT CHANGE UP
MAGNESIUM SERPL-MCNC: 2 MG/DL — SIGNIFICANT CHANGE UP (ref 1.8–2.4)
MCHC RBC-ENTMCNC: 28.1 PG — SIGNIFICANT CHANGE UP (ref 27–31)
MCHC RBC-ENTMCNC: 29.5 G/DL — LOW (ref 32–37)
MCV RBC AUTO: 95.3 FL — SIGNIFICANT CHANGE UP (ref 81–99)
NITRITE UR-MCNC: NEGATIVE — SIGNIFICANT CHANGE UP
NRBC # BLD: 0 /100 WBCS — SIGNIFICANT CHANGE UP (ref 0–0)
PH UR: 6 — SIGNIFICANT CHANGE UP (ref 5–8)
PLATELET # BLD AUTO: 140 K/UL — SIGNIFICANT CHANGE UP (ref 130–400)
POTASSIUM SERPL-MCNC: 4.9 MMOL/L — SIGNIFICANT CHANGE UP (ref 3.5–5)
POTASSIUM SERPL-SCNC: 4.9 MMOL/L — SIGNIFICANT CHANGE UP (ref 3.5–5)
PROT SERPL-MCNC: 5.6 G/DL — LOW (ref 6–8)
PROT UR-MCNC: NEGATIVE — SIGNIFICANT CHANGE UP
RBC # BLD: 2.99 M/UL — LOW (ref 4.2–5.4)
RBC # FLD: 19.2 % — HIGH (ref 11.5–14.5)
SARS-COV-2 RNA SPEC QL NAA+PROBE: SIGNIFICANT CHANGE UP
SODIUM SERPL-SCNC: 146 MMOL/L — SIGNIFICANT CHANGE UP (ref 135–146)
SP GR SPEC: 1.01 — SIGNIFICANT CHANGE UP (ref 1.01–1.03)
UROBILINOGEN FLD QL: SIGNIFICANT CHANGE UP
WBC # BLD: 5.93 K/UL — SIGNIFICANT CHANGE UP (ref 4.8–10.8)
WBC # FLD AUTO: 5.93 K/UL — SIGNIFICANT CHANGE UP (ref 4.8–10.8)

## 2022-03-10 PROCEDURE — 99222 1ST HOSP IP/OBS MODERATE 55: CPT

## 2022-03-10 PROCEDURE — 99233 SBSQ HOSP IP/OBS HIGH 50: CPT

## 2022-03-10 RX ORDER — PANTOPRAZOLE SODIUM 20 MG/1
40 TABLET, DELAYED RELEASE ORAL
Refills: 0 | Status: DISCONTINUED | OUTPATIENT
Start: 2022-03-10 | End: 2022-03-16

## 2022-03-10 RX ADMIN — CHLORHEXIDINE GLUCONATE 1 APPLICATION(S): 213 SOLUTION TOPICAL at 05:28

## 2022-03-10 RX ADMIN — Medication 25 MILLIGRAM(S): at 05:24

## 2022-03-10 RX ADMIN — PANTOPRAZOLE SODIUM 40 MILLIGRAM(S): 20 TABLET, DELAYED RELEASE ORAL at 17:59

## 2022-03-10 RX ADMIN — PANTOPRAZOLE SODIUM 40 MILLIGRAM(S): 20 TABLET, DELAYED RELEASE ORAL at 05:24

## 2022-03-10 RX ADMIN — Medication 25 MILLIGRAM(S): at 17:58

## 2022-03-10 RX ADMIN — Medication 40 MILLIGRAM(S): at 05:24

## 2022-03-10 RX ADMIN — Medication 50 MILLIGRAM(S): at 17:59

## 2022-03-10 NOTE — CONSULT NOTE ADULT - ASSESSMENT
Patient is a 76y female w/ a PMHx of CKD IV (baseline Cr 1.7-2.2), HFpEF EF 74%, Severe AS, Chronic anemia likely due to GI bleed per capsule endoscopy (2/2022) showing duodenum and gastric AVM (outpatient pt receives Procrit 20,000 units every other week, and transfusions every week), Fe deficiency, Grade II Diverticulosis, Internal hemorrhoids, erosive gastritis, HTN presenting from NH for low hemoglobin (4.5) per o/p labs as instructed by Dr. Silver. Pt c/o substernal chest discomfort, SOB, worsening leg swelling, and orthopnea. Pt also admits to passing dark stools almost daily. denies fevers, chills, nausea, vomiting.     In the ED, pt was vitally stable /44, 97F, 74 HR, O2 99% RA. Pt had LE edema and crackles on exam. Labs showed Hgb 4.5, Cr 2.7, BUN 60. CXR showed b/l perihilar and lower lobe opacities and effusions L>R. Pt was started on IV Lasix 40mg x1 dose in ED then started on Lasix 40mg BID, salt restriction, and fluid restriction.     Pt admitted for anemia. Being followed  by GI, Cardio, and Heme Onc. Cr began to rise on 3/8. Nephrology consulted for EVAN on CKD IV.       #EVAN on CKD IV    - b/l Cr 1.7-2.2 (2/2022)  - Cr today 3.1 from 3.3, BUN 68 from 69  -  Patient is a 76y female w/ a PMHx of CKD IV (baseline Cr 1.7-2.2 from 2022), HFpEF EF 74%, Severe AS, Chronic anemia likely due to GI bleed per capsule endoscopy (2/2022) showing duodenum and gastric AVM (outpatient pt receives Procrit 20,000 units every other week, and transfusions every week), Fe deficiency, Grade II Diverticulosis, Internal hemorrhoids, erosive gastritis, HTN presenting from NH for low hemoglobin (4.5) per o/p labs as instructed by Dr. Silver. Pt c/o substernal chest discomfort, SOB, worsening leg swelling, and orthopnea. Pt also admits to passing dark stools almost daily. Denies fevers, chills, nausea, vomiting.     In the ED, pt was vitally stable /44, 97F, 74 HR, O2 99% RA. Pt had LE edema and crackles on exam. Labs showed Hgb 4.5, Cr 2.7, BUN 60. CXR showed b/l perihilar and lower lobe opacities and effusions L>R. Pt was started on IV Lasix 40mg x1 dose in ED then started on Lasix 40mg BID, salt restriction, and fluid restriction.     Pt admitted for anemia. Being followed  by GI, Cardio, and Heme Onc. Cr began to rise on 3/8 -> Lasix then decreased to 40mg qd. Nephrology consulted for EVAN on CKD IV.       #EVAN on CKD IV    - b/l Cr 1.7-2.2 (2/2022)  - Cr today 3.1 from 3.3, BUN 68 from 69  - Na 146, K 4.9, Ca 8.5, Mg 2  - C/w Lasix - monitor BP as it will drop due to right-sided HF  - Consider PrimaFit - strict I&Os  - Serial BMP  - C/w protonix  - Kidney/Bladder U/S - no hydronephrosis or calculi b/l    #Rash    - R/o Interstitial Nephritis   - Stat UA  - Urine Eosinophils  - Consider steroids    #Fe Deficiency    - C/w Venofer until % Sat is at least 45  - Repeat Iron studies   Patient is a 76y female w/ a PMHx of CKD IV (baseline Cr 1.7-2.2 from 2022), HFpEF EF 74%, Severe AS, Chronic anemia likely due to GI bleed per capsule endoscopy (2/2022) showing duodenum and gastric AVM (outpatient pt receives Procrit 20,000 units every other week, and transfusions every week), Fe deficiency, Grade II Diverticulosis, Internal hemorrhoids, erosive gastritis, HTN presenting from NH for low hemoglobin (4.5) per o/p labs as instructed by Dr. Silver. Pt c/o substernal chest discomfort, SOB, worsening leg swelling, and orthopnea. Pt also admits to passing dark stools almost daily. Denies fevers, chills, nausea, vomiting.     In the ED, pt was vitally stable /44, 97F, 74 HR, O2 99% RA. Pt had LE edema and crackles on exam. Labs showed Hgb 4.5, Cr 2.7, BUN 60. CXR showed b/l perihilar and lower lobe opacities and effusions L>R. Pt was started on IV Lasix 40mg x1 dose in ED then started on Lasix 40mg BID, salt restriction, and fluid restriction.     Pt admitted for anemia. Being followed  by GI, Cardio, and Heme Onc. Cr began to rise on 3/8 -> Lasix then decreased to 40mg qd. Nephrology consulted for EVAN on CKD IV.       #EVAN on CKD IV    - b/l Cr 1.7-2.2 (2/2022)  - Cr today 3.1 from 3.3, BUN 68 from 69  - Na 146, K 4.9, Ca 8.5, Mg 2  - C/w Lasix - monitor BP as it will drop due to right-sided HF  - Consider PrimaFit - strict I&Os  - Serial BMP  - C/w protonix  - Kidney/Bladder U/S - no hydronephrosis or calculi b/l    #Rash    - R/o Interstitial Nephritis   - Stat UA  - Urine Eosinophils  - Consider steroids    #Fe Deficiency    - C/w Venofer until % Sat is at ~25-30%  - Repeat Iron studies

## 2022-03-10 NOTE — PROGRESS NOTE ADULT - SUBJECTIVE AND OBJECTIVE BOX
Patient is a 76y old  Female who presents with a chief complaint of Low Hb (09 Mar 2022 07:36)    INTERVAL HPI/OVERNIGHT EVENTS:   No overnight events   Afebrile, hemodynamically stable     Vital Signs Last 24 Hrs  T(C): 35.3 (10 Mar 2022 00:29), Max: 37 (09 Mar 2022 15:25)  T(F): 95.5 (10 Mar 2022 00:29), Max: 98.6 (09 Mar 2022 15:25)  HR: 69 (10 Mar 2022 05:22) (61 - 69)  BP: 118/63 (10 Mar 2022 05:22) (112/55 - 127/59)  BP(mean): --  ABP: --  ABP(mean): --  RR: 18 (10 Mar 2022 00:29) (18 - 18)  SpO2: 100% (09 Mar 2022 15:25) (100% - 100%)    I&O's Summary        LABS:                        7.8    4.59  )-----------( 136      ( 09 Mar 2022 06:25 )             25.9     03-09    147<H>  |  108  |  69<HH>  ----------------------------<  100<H>  4.5   |  28  |  3.3<H>    Ca    8.4<L>      09 Mar 2022 06:25  Mg     2.1     03-09    TPro  5.6<L>  /  Alb  3.3<L>  /  TBili  0.8  /  DBili  x   /  AST  11  /  ALT  7   /  AlkPhos  64  03-09        CAPILLARY BLOOD GLUCOSE            RADIOLOGY & ADDITIONAL TESTS:    Consultant(s) Notes Reviewed:  [x ] YES  [ ] NO    MEDICATIONS  (STANDING):  chlorhexidine 4% Liquid 1 Application(s) Topical <User Schedule>  furosemide   Injectable 40 milliGRAM(s) IV Push daily  metoprolol tartrate 25 milliGRAM(s) Oral two times a day  pantoprazole  Injectable 40 milliGRAM(s) IV Push every 12 hours    MEDICATIONS  (PRN):  acetaminophen     Tablet .. 650 milliGRAM(s) Oral every 6 hours PRN Mild Pain (1 - 3)  aluminum hydroxide/magnesium hydroxide/simethicone Suspension 30 milliLiter(s) Oral every 4 hours PRN Dyspepsia  diphenhydrAMINE 25 milliGRAM(s) Oral every 4 hours PRN Rash and/or Itching  ondansetron Injectable 4 milliGRAM(s) IV Push every 8 hours PRN Nausea and/or Vomiting      PHYSICAL EXAM:  GENERAL: in NAD  HEENT: atraumatic  Cardiac: 4/6 systolic murmur  Respiratory: bilateral decreased basilar breath sounds  Abdomen: no tenderness to palpation. bowel sounds present  Extremities: bilateral LE edema  Skin: no rashes or lesions Patient is a 76y old  Female who presents with a chief complaint of Low Hb (09 Mar 2022 07:36)    INTERVAL HPI/OVERNIGHT EVENTS:   No overnight events   Afebrile, hemodynamically stable     Vital Signs Last 24 Hrs  T(C): 35.3 (10 Mar 2022 00:29), Max: 37 (09 Mar 2022 15:25)  T(F): 95.5 (10 Mar 2022 00:29), Max: 98.6 (09 Mar 2022 15:25)  HR: 69 (10 Mar 2022 05:22) (61 - 69)  BP: 118/63 (10 Mar 2022 05:22) (112/55 - 127/59)  BP(mean): --  ABP: --  ABP(mean): --  RR: 18 (10 Mar 2022 00:29) (18 - 18)  SpO2: 100% (09 Mar 2022 15:25) (100% - 100%)    I&O's Summary        LABS:                        7.8    4.59  )-----------( 136      ( 09 Mar 2022 06:25 )             25.9     03-09    147<H>  |  108  |  69<HH>  ----------------------------<  100<H>  4.5   |  28  |  3.3<H>    Ca    8.4<L>      09 Mar 2022 06:25  Mg     2.1     03-09    TPro  5.6<L>  /  Alb  3.3<L>  /  TBili  0.8  /  DBili  x   /  AST  11  /  ALT  7   /  AlkPhos  64  03-09        CAPILLARY BLOOD GLUCOSE            RADIOLOGY & ADDITIONAL TESTS:    Consultant(s) Notes Reviewed:  [x ] YES  [ ] NO    MEDICATIONS  (STANDING):  chlorhexidine 4% Liquid 1 Application(s) Topical <User Schedule>  furosemide   Injectable 40 milliGRAM(s) IV Push daily  metoprolol tartrate 25 milliGRAM(s) Oral two times a day  pantoprazole  Injectable 40 milliGRAM(s) IV Push every 12 hours    MEDICATIONS  (PRN):  acetaminophen     Tablet .. 650 milliGRAM(s) Oral every 6 hours PRN Mild Pain (1 - 3)  aluminum hydroxide/magnesium hydroxide/simethicone Suspension 30 milliLiter(s) Oral every 4 hours PRN Dyspepsia  diphenhydrAMINE 25 milliGRAM(s) Oral every 4 hours PRN Rash and/or Itching  ondansetron Injectable 4 milliGRAM(s) IV Push every 8 hours PRN Nausea and/or Vomiting      PHYSICAL EXAM:  GENERAL: in NAD  HEENT: atraumatic  Cardiac: 3/6 systolic murmur  Respiratory: bilateral decreased basilar breath sounds  Abdomen: no tenderness to palpation. bowel sounds present  Extremities: bilateral LE edema  Skin: no rashes or lesions

## 2022-03-10 NOTE — PROGRESS NOTE ADULT - ASSESSMENT
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 and Gastric AVM Bleeding in setting of CKD and DARRIN  #Moderate Diverticulosis   #Grade II Internal Hemorrhoids  #Erosive Gastritis  * Anemia Likely Secondary to obscure GI bleeding (from Duodenum and Gastric AVM per recent capsule endoscopy 02/2022 after presenting with melena), Fe Deficiency (TFN S% 5 in 2020 and ferritin 28 in 2022), and CKD per Dr Silver  * Receives Transfusions q1-2 weeks and Retacrit 42536 units weekly  * Recent Admission in 02/2022 for melena: s/p capsule endoscopy showing bleeding from Duodenum and Gastric AVM   * Moderate Diverticulosis and Grade II Internal Hemorrhoids on colonoscopy 10/22/2019  * Erosive Gastritis on EGD 01/09/2020  * Hemodynamically stable in ED, OLEKSANDR with brown stool, Hb 4.5 s/p 3 units pRBC and lasix IV     - GI team consulted: per previous admission and Dr Paula's risk stratification, patient at high risk for endoscopic interventions in setting of severe AS. pt would only want endoscopic intervention for overt evidence of bleeding  - s/p 4 u pRBC in total  - keep Hgb >8, active T/S  - Trend CBC closely and transfuse as needed (avoid volume overload with diuresis but keep an eye on BP in setting of severe AS)  - c/w PPI IV q12  - monitor BM  - advanced diet to soft  - LDH, retic count elevated  - retacrit 20,000unit x 1 dose 3/7 and venofer 200mg x 3 doses starting 3/7 as per heme/onc recs  - f/u haptoglobin  - f/u wvf antigen 198, vwf activity 188, and vf multimers, r/o Heyde syndrome  - as per discussion with GI fellow 3/9, pt does not have any signs of active bleeding, no interventions    #Acute HFpEF  #severe AS  * Last TTE 02/17/22 EF 74%, severe AS with A 0.8cm2 and mean P 48.9,  mild MR  Acute on Chronic HFmEF Exacerbation  * Cardiologist Dr Paula  * PE leg swelling and diffuse crackles; CXR with Bilateral perihilar and lower lobe opacities and effusions, left greater than right.  * S/P IV Lasix 40mg x1 dose in ED  * BNP 3116    - Cardiology Dr Paula consulted for discussion of TAVR/ SAVR in setting of severe AS and high risk for endoscopic procedures (patient needs a solution for GI bleeding which would only be achieved with an endoscopic intervention)  - Monitor in/out  - Daily weight (standing)  - Monitor SaO2 and O2 requirements  - Salt restricted diet and Fluid restriction to 1.2L per 24 hours  - cardio consult: cath and possible TAVR potentially in future once active GI bleed, anemia, and EVAN resolved  - lasix 40mg IV q12 -> changed to IV lasix 40mg 3/8  - discussed with cardiology fellow, pt will require medical optimization before cardio can work her up for potential cath/TAVR in the future  - f/u HF CONSULT    #EVAN on CKD4 vs progressive CKD- likely CRS- stable  CKD Progression Versus Cardiorenal (volume overload) or Prerenal EVAN (slow GI bleeding)  * Baseline Cr 1.7-2.2 2022  * Recent admission with Cr 2.7-2.9 throughout  * ED BUN 60, Cr 2.7    - U/S: no hydronephrosis  - c/w diuresis for volume overload and monitor  - Cr stable  - c/w Diuresis as above. spoke with RN staff at [facility]. pt was not on any diuretics there  - monitor BMP  - renal/bladder U/S- no stones or hydro  - f/u renal consult    #Hypertension  * Home lopressor 25mg BID  * ED /44mmHg  - Monitor BP and avoid hypotension in setting of severe AS  - Resume home anti HTN with holding parameters  - Diuretics as above    #Thrombocytopenia- improving    #Sciatica  - PT once more euvolemic  - Pain control    #Rash-possibly allergic  - s/p prednisone 20mg x2 doses 3/6 and 3/8  - pt reports that she always gets premedicated before getting blood transfusions    #Misc:  - DVT Prophylaxis: SCDs  - GI Prophylaxis: protonix IV BID  - Diet: soft and bite sized, 1.2L fluid restriction  - Code Status: Full  - Dispo: [facility]  - Pending: haptoglobin, vwf studies, heme/onc follow up, Cr, nephro f/u, HF consult   76 year old female patient with multiple comorbidities including severe AS, HTN, CKD, sciatica, and anemia recently found to have evidence of 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 Bleeding in setting of CKD and DARRIN  #Moderate Diverticulosis   #Grade II Internal Hemorrhoids  #Erosive Gastritis  * Anemia Likely Secondary to obscure GI bleeding (from Duodenum and Gastric AVM per recent capsule endoscopy 02/2022 after presenting with melena), Fe Deficiency (TFN S% 5 in 2020 and ferritin 28 in 2022), and CKD per Dr Silver  * Receives Transfusions q1-2 weeks and Retacrit 71136 units weekly  * Recent Admission in 02/2022 for melena: s/p capsule endoscopy showing bleeding from Duodenum and Gastric AVM   * Moderate Diverticulosis and Grade II Internal Hemorrhoids on colonoscopy 10/22/2019  * Erosive Gastritis on EGD 01/09/2020  * Hemodynamically stable in ED, OLEKSANDR with brown stool, Hb 4.5 s/p 3 units pRBC and lasix IV     - GI team consulted: per previous admission and Dr Paula's risk stratification, patient at high risk for endoscopic interventions in setting of severe AS. pt would only want endoscopic intervention for overt evidence of bleeding  - s/p 4 u pRBC in total  - keep Hgb >8, active T/S  - Trend CBC closely and transfuse as needed (avoid volume overload with diuresis but keep an eye on BP in setting of severe AS)  - c/w PPI IV q12  - monitor BM  - LDH, retic count elevated  - retacrit 20,000unit x 1 dose 3/7 and venofer 200mg x 3 doses starting 3/7 as per heme/onc recs  - f/u haptoglobin  - f/u wvf antigen 198, vwf activity 188, and vf multimers, r/o Heyde syndrome  - as per discussion with GI fellow 3/9, pt does not have any signs of active bleeding, no acute interventions    #Acute HFpEF  #severe AS  * Last TTE 02/17/22 EF 74%, severe AS with A 0.8cm2 and mean P 48.9,  mild MR  Acute on Chronic HFmEF Exacerbation  * Cardiologist Dr Paula  * PE leg swelling and diffuse crackles; CXR with Bilateral perihilar and lower lobe opacities and effusions, left greater than right.  * S/P IV Lasix 40mg x1 dose in ED  * BNP 3116    - Cardiology Dr Paula consulted for discussion of TAVR/ SAVR in setting of severe AS and high risk for endoscopic procedures (patient needs a solution for GI bleeding which would only be achieved with an endoscopic intervention)  - Monitor in/out  - Daily weight (standing)  - Monitor SaO2 and O2 requirements  - Salt restricted diet and Fluid restriction to 1.2L per 24 hours  - cardio consult: cath and possible TAVR potentially in future once active GI bleed, anemia, and EVAN resolved  - lasix 40mg IV q12 -> changed to IV lasix 40mg 3/8  - maintain negative balance  - discussed with cardiology fellow 3/9, pt will require medical optimization before cardio can work her up for potential cath/TAVR in the future  - f/u HF CONSULT    #EVAN on CKD4 vs progressive CKD- likely CRS- improving  CKD Progression Versus Cardiorenal (volume overload) or Prerenal EVAN (slow GI bleeding)  * Baseline Cr 1.7-2.2 2022  * Recent admission with Cr 2.7-2.9 throughout  * ED BUN 60, Cr 2.7    - U/S: no hydronephrosis  - c/w diuresis for volume overload and monitor  - c/w Diuresis as above. spoke with RN staff at [facility]. pt was not on any diuretics there  - monitor BMP  - renal/bladder U/S- no stones or hydro  - f/u renal consult    #Hypertension  * Home lopressor 25mg BID  * ED /44mmHg  - Monitor BP and avoid hypotension in setting of severe AS  - Resume home anti HTN with holding parameters  - Diuretics as above    #Thrombocytopenia- improving    #Sciatica  - PT once more euvolemic  - Pain control    #Rash-possibly allergic  - s/p prednisone 20mg x2 doses 3/6 and 3/8  - pt reports that she always gets premedicated before getting blood transfusions    #Misc:  - DVT Prophylaxis: SCDs  - GI Prophylaxis: protonix IV BID  - Diet: soft and bite sized, 1.2L fluid restriction  - Code Status: Full  - Dispo: [facility]  - Pending: haptoglobin, vwf studies, heme/onc follow up, Cr, nephro f/u, HF consult

## 2022-03-10 NOTE — CONSULT NOTE ADULT - ATTENDING COMMENTS
Patient with anemia. Has had an extensive GI work up in past -inconclusive. High risk for endoscopy Rec Conservative management.
The patient was seen and examined with the Cardiology Fellow. Any Valve procedure either surgical or TAVR would require correction of the patient's anemia and optimization of her Hg/HCT to a tolerable level to proceed with these procedures as anticoagulation is required. The patient's anemia would need to be corrected prior to proceeding with any of the above valve procedures.
This is a 76-year-old female who was admitted for recurrence of her severe anemia.  She has a history of above including severe AS as well as duodenum and gastric AVMs.  She also has CKD.  Unfortunately she is in a situation where she needs valve repair with TAVR for which patient needs a hemoglobin of greater than or equal to 10 and the GI team is reluctant to take patient for endoscopic intervention for endoscopic therapy due to her cardiac comorbidities.    Patient also informed me that in her facility a few weeks ago she did have black stools    Plan  #Severe anemia is multifactorial and due to CKD as well as gastrointestinal blood loss likely due to AVM maybe due to severe AS  -We will continue with Procrit 20,000 units every other week next dose is due now  as long as blood pressure is controlled  -She has been undergoing Venofer infusions as well, suggest recheck iron studies and if ferritin is under  100 or iron saturations less than 20%  we will administer Venofer 200 mg IV for 3 doses fo rnow   -GI is following  -Can transfuse to keep hemoglobin greater than 7-8 depending on symptoms  -If hemoglobin of 10 is needed and patient continues to bleed it may not be possible to achieve this goal for the TAVR, thus we can administer  a unit a day as long as the cardiology team feels patient will be able to tolerate this order to achieve the goal for her  procedure  -Her von Willebrand activity was 198, can check antigen on multimer    #Mild intermittent thrombocytopenia normal may be due to consumption from GI bleed or splenic sequestration/hypersplenism if have elevated portal hypertension to valvular heart disease
Patient is a 76y female w/ a PMHx of CKD IV (baseline Cr 1.7-2.2 from 2022), HFpEF EF 74%, Severe AS, Chronic anemia likely due to GI bleed per capsule endoscopy (2/2022) showing duodenum and gastric AVM (outpatient pt receives Procrit 20,000 units every other week, and transfusions every week), Fe deficiency, Grade II Diverticulosis, Internal hemorrhoids, erosive gastritis, HTN presenting from NH for low hemoglobin (4.5) per o/p labs as instructed by Dr. Silver. Pt c/o substernal chest discomfort, SOB, worsening leg swelling, and orthopnea. Pt also admits to passing dark stools almost daily.    Laura on CKD 4 - due to CRS vs AIN - new skin rash on torso and arm   Overidealized now - agree to reduce LAsix to 40 once a day  Please place a Primafit for strict Is and OS  Send UA and Urine for Eosinophils  Urine NA creat  Kidney sono no hydro  Creat stable after reducing the dose of LAsix  if AIN suspected, creat would cont to rise  Possible culprits - PAntoprazole and LAsix  But pt needs both now  May add po or IV steriods for the rash (symptomatic)  GIB - AVM - call Dr Hennessy for possible cauterization  AS - will need a CTA  d/w HS

## 2022-03-10 NOTE — CONSULT NOTE ADULT - SUBJECTIVE AND OBJECTIVE BOX
NEPHROLOGY CONSULTATION NOTE    THIS CONSULT IS INCOMPLETE / FULL CONSULT TO FOLLOW    Patient is a 76y female w/ a PMHx of CKD IV (baseline Cr 1.7-2.2), HFpEF EF 74%, Severe AS, Chronic anemia likely due to GI bleed per capsule endoscopy (2/2022) showing duodenum and gastric AVM (outpatient pt receives Procrit 20,000 units every other week, and transfusions every week), Fe deficiency, Grade II Diverticulosis, Internal hemorrhoids, erosive gastritis, HTN presenting from NH for low hemoglobin (4.5) per o/p labs as instructed by Dr. Silver. Pt c/o substernal chest discomfort, SOB, worsening leg swelling, and orthopnea. Pt also admits to passing dark stools almost daily. denies fevers, chills, nausea, vomiting.     In the ED, pt was vitally stable /44, 97F, 74 HR, O2 99% RA. Pt had LE edema and crackles on exam. Labs showed Hgb 4.5, Cr 2.7, BUN 60. CXR showed b/l perihilar and lower lobe opacities and effusions L>R. Pt was started on IV Lasix 40mg x1 dose in ED then started on Lasix 40mg BID, salt restriction, and fluid restriction.     Pt admitted for anemia. Being followed  by GI, Cardio, and Heme Onc. Cr began to rise on 3/8. Nephrology consulted for EVAN on CKD IV.     Today: Pt is laying in bed comfortably and c/o cramps from constipation - last BM this AM - no visible blood. She denies SOB, chest discomfort, or urinary issues. Pt was fluid overloaded on exam with +2 b/l LE edema. No crackles on lung auscultation. Unable to assess urine output - not being measured. Pt on Lasix 40mg qd. No culver present.     PAST MEDICAL & SURGICAL HISTORY:  HTN (hypertension)    Heart murmur    Eczema    Anemia    Aortic stenosis    H/O appendicitis    H/O cholecystitis      Allergies:  aspirin (Rash)  latex (Unknown)  penicillins (Unknown)    Home Medications Reviewed  Hospital Medications:   MEDICATIONS  (STANDING):  chlorhexidine 4% Liquid 1 Application(s) Topical <User Schedule>  furosemide   Injectable 40 milliGRAM(s) IV Push daily  metoprolol tartrate 25 milliGRAM(s) Oral two times a day  pantoprazole  Injectable 40 milliGRAM(s) IV Push every 12 hours      SOCIAL HISTORY:  Denies ETOH,Smoking,   FAMILY HISTORY:  FH: kidney disease (Father)          REVIEW OF SYSTEMS:  CONSTITUTIONAL: No weakness, fevers or chills  EYES/ENT: No visual changes;  No vertigo or throat pain   NECK: No pain or stiffness  RESPIRATORY: No cough, wheezing, hemoptysis; No shortness of breath  CARDIOVASCULAR: No chest pain or palpitations.  GASTROINTESTINAL: No abdominal or epigastric pain. No nausea, vomiting, or hematemesis; No diarrhea, + constipation.   GENITOURINARY: No dysuria, frequency, foamy urine, urinary urgency, incontinence or hematuria  NEUROLOGICAL: No numbness or weakness  SKIN: No itching, burning, rashes, or lesions   VASCULAR: lower extremity edema.   All other review of systems is negative unless indicated above.    VITALS:  T(F): 96.6 (03-10-22 @ 07:56), Max: 98.6 (03-09-22 @ 15:25)  HR: 63 (03-10-22 @ 07:56)  BP: 136/56 (03-10-22 @ 07:56)  RR: 18 (03-10-22 @ 07:56)  SpO2: 100% (03-09-22 @ 15:25)        I&O's Detail        PHYSICAL EXAM:  Constitutional: NAD  HEENT: anicteric sclera, oropharynx clear, MMM  Neck: No JVD  Respiratory: CTAB, no wheezes, rales or rhonchi  Cardiovascular: aortic crescendo/decrescendo systolic murmur   Gastrointestinal: BS+, soft, NT/ND  Extremities: No cyanosis or clubbing. +2 b/l peripheral edema  Neurological: A/O x 3, no focal deficits  Psychiatric: Normal mood, normal affect  : No CVA tenderness. No culver.   Skin: No rashes  Vascular Access:    LABS:  03-10    146  |  109  |  68<HH>  ----------------------------<  81  4.9   |  26  |  3.1<H>    Ca    8.5      10 Mar 2022 04:30  Mg     2.0     03-10    TPro  5.6<L>  /  Alb  3.3<L>  /  TBili  0.8  /  DBili      /  AST  14  /  ALT  7   /  AlkPhos  64  03-10    Creatinine Trend: 3.1 <--, 3.3 <--, 3.3 <--, 3.2 <--, 3.3 <--, 3.1 <--, 2.7 <--, 2.8 <--, 2.7 <--, 3.0 <--, 2.9 <--, 2.8 <--, 3.1 <--, 3.4 <--, 3.9 <--, 3.7 <--, 3.6 <--, 2.9 <--, 2.6 <--, 2.3 <--, 2.1 <--                        7.8    4.59  )-----------( 136      ( 09 Mar 2022 06:25 )             25.9     Urine Studies:              RADIOLOGY & ADDITIONAL STUDIES:        < from: US Kidney and Bladder (03.09.22 @ 14:31) >  ACC: [id number] EXAM:  US KIDNEYS AND BLADDER                          PROCEDURE DATE:  03/09/2022          INTERPRETATION:  CLINICAL INFORMATION: Hydronephrosis.    COMPARISON: 2/19/2022    TECHNIQUE: Sonography of the kidneys and bladder.    FINDINGS: Limited evaluation due to body habitus.    Right kidney: 10.4 cm. No hydronephrosis or calculi.    Left kidney:  10.0 cm. No hydronephrosis or calculi.    Urinary bladder: Empty, limiting assessment.    IMPRESSION:    Limited evaluation due to body habitus.  No stones or hydronephrosis.        --- End of Report ---    < end of copied text >  < from: Xray Chest 1 View- PORTABLE-Routine (Xray Chest 1 View- PORTABLE-Routine in AM.) (03.05.22 @ 17:00) >  IMPRESSION:    Stable bilateral lower lobe opacities and effusions, left greater than   right.    Stable, increased interstitial markings.    --- End of Report ---    < end of copied text >  < from: [facility] Duplex Lower Ext Vein Scan, Bilat (03.05.22 @ 11:04) >  Impression:    No evidence of deep venous thrombosis or superficial thrombophlebitis in   the bilateral lower extremities.    Peroneal vein is not visualized on the right side      < end of copied text >           NEPHROLOGY CONSULTATION NOTE    THIS CONSULT IS INCOMPLETE / FULL CONSULT TO FOLLOW    Patient is a 76y female w/ a PMHx of CKD IV (baseline Cr 1.7-2.2 from 2022), HFpEF EF 74%, Severe AS, Chronic anemia likely due to GI bleed per capsule endoscopy (2/2022) showing duodenum and gastric AVM (outpatient pt receives Procrit 20,000 units every other week, and transfusions every week), Fe deficiency, Grade II Diverticulosis, Internal hemorrhoids, erosive gastritis, HTN presenting from NH for low hemoglobin (4.5) per o/p labs as instructed by Dr. Silver. Pt c/o substernal chest discomfort, SOB, worsening leg swelling, and orthopnea. Pt also admits to passing dark stools almost daily. Denies fevers, chills, nausea, vomiting.     In the ED, pt was vitally stable /44, 97F, 74 HR, O2 99% RA. Pt had LE edema and crackles on exam. Labs showed Hgb 4.5, Cr 2.7, BUN 60. CXR showed b/l perihilar and lower lobe opacities and effusions L>R. Pt was started on IV Lasix 40mg x1 dose in ED then started on Lasix 40mg BID, salt restriction, and fluid restriction.     Pt admitted for anemia. Being followed  by GI, Cardio, and Heme Onc. Cr began to rise on 3/8 -> Lasix then decreased to 40mg qd. Nephrology consulted for EVAN on CKD IV.     Today: Pt is laying in bed comfortably and c/o cramps from constipation - last BM this AM - no visible blood. She denies SOB, chest discomfort, or urinary issues. Pt was fluid overloaded on exam with +2 b/l LE edema. No crackles on lung auscultation. Unable to assess urine output - not being measured. Pt on Lasix 40mg qd. No culver present.     PAST MEDICAL & SURGICAL HISTORY:  HTN (hypertension)    Heart murmur    Eczema    Anemia    Aortic stenosis    H/O appendicitis    H/O cholecystitis      Allergies:  aspirin (Rash)  latex (Unknown)  penicillins (Unknown)    Home Medications Reviewed  Hospital Medications:   MEDICATIONS  (STANDING):  chlorhexidine 4% Liquid 1 Application(s) Topical <User Schedule>  furosemide   Injectable 40 milliGRAM(s) IV Push daily  metoprolol tartrate 25 milliGRAM(s) Oral two times a day  pantoprazole  Injectable 40 milliGRAM(s) IV Push every 12 hours      SOCIAL HISTORY:  Denies ETOH,Smoking,   FAMILY HISTORY:  FH: kidney disease (Father)          REVIEW OF SYSTEMS:  CONSTITUTIONAL: No weakness, fevers or chills  EYES/ENT: No visual changes;  No vertigo or throat pain   NECK: No pain or stiffness  RESPIRATORY: No cough, wheezing, hemoptysis; No shortness of breath  CARDIOVASCULAR: No chest pain or palpitations.  GASTROINTESTINAL: No abdominal or epigastric pain. No nausea, vomiting, or hematemesis; No diarrhea, + constipation.   GENITOURINARY: No dysuria, frequency, foamy urine, urinary urgency, incontinence or hematuria  NEUROLOGICAL: No numbness or weakness  SKIN: No itching, burning, or lesions. + rash on chest   VASCULAR: lower extremity edema.   All other review of systems is negative unless indicated above.    VITALS:  T(F): 96.6 (03-10-22 @ 07:56), Max: 98.6 (03-09-22 @ 15:25)  HR: 63 (03-10-22 @ 07:56)  BP: 136/56 (03-10-22 @ 07:56)  RR: 18 (03-10-22 @ 07:56)  SpO2: 100% (03-09-22 @ 15:25)        I&O's Detail        PHYSICAL EXAM:  Constitutional: NAD  HEENT: anicteric sclera, oropharynx clear, MMM  Neck: No JVD  Respiratory: CTAB, no wheezes, rales or rhonchi  Cardiovascular: aortic crescendo/decrescendo systolic murmur   Gastrointestinal: BS+, soft, NT/ND  Extremities: No cyanosis or clubbing. +2 b/l peripheral edema  Neurological: A/O x 3, no focal deficits  Psychiatric: Normal mood, normal affect  : No CVA tenderness. No culver.   Skin: Rash on chest  Vascular Access:    LABS:  03-10    146  |  109  |  68<HH>  ----------------------------<  81  4.9   |  26  |  3.1<H>    Ca    8.5      10 Mar 2022 04:30  Mg     2.0     03-10    TPro  5.6<L>  /  Alb  3.3<L>  /  TBili  0.8  /  DBili      /  AST  14  /  ALT  7   /  AlkPhos  64  03-10    Creatinine Trend: 3.1 <--, 3.3 <--, 3.3 <--, 3.2 <--, 3.3 <--, 3.1 <--, 2.7 <--, 2.8 <--, 2.7 <--, 3.0 <--, 2.9 <--, 2.8 <--, 3.1 <--, 3.4 <--, 3.9 <--, 3.7 <--, 3.6 <--, 2.9 <--, 2.6 <--, 2.3 <--, 2.1 <--                        7.8    4.59  )-----------( 136      ( 09 Mar 2022 06:25 )             25.9     Urine Studies:              RADIOLOGY & ADDITIONAL STUDIES:        < from: US Kidney and Bladder (03.09.22 @ 14:31) >  ACC: [id number] EXAM:  US KIDNEYS AND BLADDER                          PROCEDURE DATE:  03/09/2022          INTERPRETATION:  CLINICAL INFORMATION: Hydronephrosis.    COMPARISON: 2/19/2022    TECHNIQUE: Sonography of the kidneys and bladder.    FINDINGS: Limited evaluation due to body habitus.    Right kidney: 10.4 cm. No hydronephrosis or calculi.    Left kidney:  10.0 cm. No hydronephrosis or calculi.    Urinary bladder: Empty, limiting assessment.    IMPRESSION:    Limited evaluation due to body habitus.  No stones or hydronephrosis.        --- End of Report ---    < end of copied text >  < from: Xray Chest 1 View- PORTABLE-Routine (Xray Chest 1 View- PORTABLE-Routine in AM.) (03.05.22 @ 17:00) >  IMPRESSION:    Stable bilateral lower lobe opacities and effusions, left greater than   right.    Stable, increased interstitial markings.    --- End of Report ---    < end of copied text >  < from: [facility] Duplex Lower Ext Vein Scan, Bilat (03.05.22 @ 11:04) >  Impression:    No evidence of deep venous thrombosis or superficial thrombophlebitis in   the bilateral lower extremities.    Peroneal vein is not visualized on the right side      < end of copied text >    < from: TTE Echo Complete w/o Contrast w/ Doppler (02.17.22 @ 12:23) >  Summary:   1. Normal global left ventricular systolic function.   2. LV Ejection Fraction by Brown's Method with a biplane EF of 74 %.   3. Normal left ventricular internal cavity size.   4. Mild mitral valve regurgitation.   5. Severe aortic valve stenosis.   6. Estimated pulmonary artery systolic pressure is 44.5 mmHg assuming a   right atrial pressure of 15 mmHg, which is consistent with mild pulmonary   hypertension.   7. Pulmonary hypertension is present.   8. Mitral valve mean gradient is 4.6 mmHg consistent with mild mitral   stenosis.   9. Peak transaortic gradient equals 86.1 mmHg, mean transaortic gradient   equals 48.9 mmHg, the calculated aortic valve area equals 0.80 cm² by the   continuity equation consistent with severe aortic stenosis.    PHYSICIAN INTERPRETATION:  Left Ventricle: The left ventricular internal cavity size is normal. Left   ventricular wall thickness is normal. There is no left ventricular   hypertrophy. Global LV systolic function was normal.    < end of copied text >         NEPHROLOGY CONSULTATION NOTE    Patient is a 76y female w/ a PMHx of CKD IV (baseline Cr 1.7-2.2 from 2022), HFpEF EF 74%, Severe AS, Chronic anemia likely due to GI bleed per capsule endoscopy (2/2022) showing duodenum and gastric AVM (outpatient pt receives Procrit 20,000 units every other week, and transfusions every week), Fe deficiency, Grade II Diverticulosis, Internal hemorrhoids, erosive gastritis, HTN presenting from NH for low hemoglobin (4.5) per o/p labs as instructed by Dr. Silver. Pt c/o substernal chest discomfort, SOB, worsening leg swelling, and orthopnea. Pt also admits to passing dark stools almost daily. Denies fevers, chills, nausea, vomiting.     In the ED, pt was vitally stable /44, 97F, 74 HR, O2 99% RA. Pt had LE edema and crackles on exam. Labs showed Hgb 4.5, Cr 2.7, BUN 60. CXR showed b/l perihilar and lower lobe opacities and effusions L>R. Pt was started on IV Lasix 40mg x1 dose in ED then started on Lasix 40mg BID, salt restriction, and fluid restriction.     Pt admitted for anemia. Being followed  by GI, Cardio, and Heme Onc. Cr began to rise on 3/8 -> Lasix then decreased to 40mg qd. Nephrology consulted for EVAN on CKD IV.     Today: Pt is laying in bed comfortably and c/o cramps from constipation - last BM this AM - no visible blood. She denies SOB, chest discomfort, or urinary issues. Pt was fluid overloaded on exam with +2 b/l LE edema. No crackles on lung auscultation. Unable to assess urine output - not being measured. Pt on Lasix 40mg qd. No culver present.     PAST MEDICAL & SURGICAL HISTORY:  HTN (hypertension)    Heart murmur    Eczema    Anemia    Aortic stenosis    H/O appendicitis    H/O cholecystitis      Allergies:  aspirin (Rash)  latex (Unknown)  penicillins (Unknown)    Home Medications Reviewed  Hospital Medications:   MEDICATIONS  (STANDING):  chlorhexidine 4% Liquid 1 Application(s) Topical <User Schedule>  furosemide   Injectable 40 milliGRAM(s) IV Push daily  metoprolol tartrate 25 milliGRAM(s) Oral two times a day  pantoprazole  Injectable 40 milliGRAM(s) IV Push every 12 hours      SOCIAL HISTORY:  Denies ETOH,Smoking,   FAMILY HISTORY:  FH: kidney disease (Father)          REVIEW OF SYSTEMS:  CONSTITUTIONAL: No weakness, fevers or chills  EYES/ENT: No visual changes;  No vertigo or throat pain   NECK: No pain or stiffness  RESPIRATORY: No cough, wheezing, hemoptysis; No shortness of breath  CARDIOVASCULAR: No chest pain or palpitations.  GASTROINTESTINAL: No abdominal or epigastric pain. No nausea, vomiting, or hematemesis; No diarrhea, + constipation.   GENITOURINARY: No dysuria, frequency, foamy urine, urinary urgency, incontinence or hematuria  NEUROLOGICAL: No numbness or weakness  SKIN: No itching, burning, or lesions. + rash on chest   VASCULAR: lower extremity edema.   All other review of systems is negative unless indicated above.    VITALS:  T(F): 96.6 (03-10-22 @ 07:56), Max: 98.6 (03-09-22 @ 15:25)  HR: 63 (03-10-22 @ 07:56)  BP: 136/56 (03-10-22 @ 07:56)  RR: 18 (03-10-22 @ 07:56)  SpO2: 100% (03-09-22 @ 15:25)        I&O's Detail        PHYSICAL EXAM:  Constitutional: NAD  HEENT: anicteric sclera, oropharynx clear, MMM  Neck: No JVD  Respiratory: CTAB, no wheezes, rales or rhonchi  Cardiovascular: aortic crescendo/decrescendo systolic murmur   Gastrointestinal: BS+, soft, NT/ND  Extremities: No cyanosis or clubbing. +2 b/l peripheral edema  Neurological: A/O x 3, no focal deficits  Psychiatric: Normal mood, normal affect  : No CVA tenderness. No culver.   Skin: Rash on chest  Vascular Access:    LABS:  03-10    146  |  109  |  68<HH>  ----------------------------<  81  4.9   |  26  |  3.1<H>    Ca    8.5      10 Mar 2022 04:30  Mg     2.0     03-10    TPro  5.6<L>  /  Alb  3.3<L>  /  TBili  0.8  /  DBili      /  AST  14  /  ALT  7   /  AlkPhos  64  03-10    Creatinine Trend: 3.1 <--, 3.3 <--, 3.3 <--, 3.2 <--, 3.3 <--, 3.1 <--, 2.7 <--, 2.8 <--, 2.7 <--, 3.0 <--, 2.9 <--, 2.8 <--, 3.1 <--, 3.4 <--, 3.9 <--, 3.7 <--, 3.6 <--, 2.9 <--, 2.6 <--, 2.3 <--, 2.1 <--                        7.8    4.59  )-----------( 136      ( 09 Mar 2022 06:25 )             25.9     Urine Studies:              RADIOLOGY & ADDITIONAL STUDIES:        < from: US Kidney and Bladder (03.09.22 @ 14:31) >  ACC: [id number] EXAM:  US KIDNEYS AND BLADDER                          PROCEDURE DATE:  03/09/2022          INTERPRETATION:  CLINICAL INFORMATION: Hydronephrosis.    COMPARISON: 2/19/2022    TECHNIQUE: Sonography of the kidneys and bladder.    FINDINGS: Limited evaluation due to body habitus.    Right kidney: 10.4 cm. No hydronephrosis or calculi.    Left kidney:  10.0 cm. No hydronephrosis or calculi.    Urinary bladder: Empty, limiting assessment.    IMPRESSION:    Limited evaluation due to body habitus.  No stones or hydronephrosis.        --- End of Report ---    < end of copied text >  < from: Xray Chest 1 View- PORTABLE-Routine (Xray Chest 1 View- PORTABLE-Routine in AM.) (03.05.22 @ 17:00) >  IMPRESSION:    Stable bilateral lower lobe opacities and effusions, left greater than   right.    Stable, increased interstitial markings.    --- End of Report ---    < end of copied text >  < from: VA Duplex Lower Ext Vein Scan, Bilat (03.05.22 @ 11:04) >  Impression:    No evidence of deep venous thrombosis or superficial thrombophlebitis in   the bilateral lower extremities.    Peroneal vein is not visualized on the right side      < end of copied text >    < from: TTE Echo Complete w/o Contrast w/ Doppler (02.17.22 @ 12:23) >  Summary:   1. Normal global left ventricular systolic function.   2. LV Ejection Fraction by Brown's Method with a biplane EF of 74 %.   3. Normal left ventricular internal cavity size.   4. Mild mitral valve regurgitation.   5. Severe aortic valve stenosis.   6. Estimated pulmonary artery systolic pressure is 44.5 mmHg assuming a   right atrial pressure of 15 mmHg, which is consistent with mild pulmonary   hypertension.   7. Pulmonary hypertension is present.   8. Mitral valve mean gradient is 4.6 mmHg consistent with mild mitral   stenosis.   9. Peak transaortic gradient equals 86.1 mmHg, mean transaortic gradient   equals 48.9 mmHg, the calculated aortic valve area equals 0.80 cm² by the   continuity equation consistent with severe aortic stenosis.    PHYSICIAN INTERPRETATION:  Left Ventricle: The left ventricular internal cavity size is normal. Left   ventricular wall thickness is normal. There is no left ventricular   hypertrophy. Global LV systolic function was normal.    < end of copied text >        < from: US Kidney and Bladder (03.09.22 @ 14:31) >    FINDINGS: Limited evaluation due to body habitus.    Right kidney: 10.4 cm. No hydronephrosis or calculi.    Left kidney:  10.0 cm. No hydronephrosis or calculi.    Urinary bladder: Empty, limiting assessment.    IMPRESSION:    Limited evaluation due to body habitus.  No stones or hydronephrosis.    < end of copied text >

## 2022-03-10 NOTE — PROGRESS NOTE ADULT - TIME BILLING
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       Acute on chronic anemia with transfusion dependence  Prior h/o  gastric AVM  bleeding  Acute HFpEF / severe AS  Possible DARRIN  EVAN over CKD 4 vs progressive CKD  Morbid obesity BMI 46.3      s/p 3 units PRBC . Hb today 8.6  H/H currently stable  GI-As per family wishes holding off on scope unless clinical deterioration  Possible DARRIN- Venofer x 3 days, Received retacrit [id number] U x 1 on 3/7  Severe AS-TAVR deferred for now once active GI bleed, anemia, and EVAN have resolve   EVAN over CKD 4 vs progressive CKD-possible CRS vs AIN. Started on po steroids. Get UA and urine pro/cr, UA for eosinophils   Acute HFpEF / severe AS. Continue lasix 40 mg IV for now  Continue current medical management for active chronic comorbid conditions.  DVT Prophylaxis as appropriate  Supportive care including activity, diet, goals of care discussed in AM rounds.  Discussed with  / in AM rounds  Handoff: Disposition: [facility] when stable, not stable for discharge yet in lieu of worsening renal function. 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       Acute on chronic anemia with transfusion dependence  Prior h/o  gastric AVM  bleeding  Acute HFpEF / severe AS  Possible DARRIN  EVAN over CKD 4 vs progressive CKD  Morbid obesity BMI 46.3      s/p 3 units PRBC . Hb today 8.6  H/H currently stable  GI-As per family wishes holding off on scope unless clinical deterioration  Possible DARRIN- Venofer x 3 days, Received retacrit [id number] U x 1 on 3/7  Severe AS-TAVR deferred for now once active GI bleed, anemia, and EVAN have resolve   EVAN over CKD 4 vs progressive CKD-possible CRS vs AIN. Started on po steroids. Get UA and urine pro/cr, UA for eosinophils   Acute HFpEF / severe AS. Continue lasix 40 mg IV for now  Continue current medical management for active chronic comorbid conditions.  Pt needs to continue protonix bid. Can be changed to po  DVT Prophylaxis as appropriate  Supportive care including activity, diet, goals of care discussed in AM rounds.  Discussed with  / in AM rounds  Handoff: Disposition: [facility] when stable, not stable for discharge yet in lieu of worsening renal function.

## 2022-03-11 ENCOUNTER — APPOINTMENT (OUTPATIENT)
Dept: INFUSION THERAPY | Facility: CLINIC | Age: 77
End: 2022-03-11

## 2022-03-11 ENCOUNTER — TRANSCRIPTION ENCOUNTER (OUTPATIENT)
Age: 77
End: 2022-03-11

## 2022-03-11 LAB
ALBUMIN SERPL ELPH-MCNC: 3.3 G/DL — LOW (ref 3.5–5.2)
ALP SERPL-CCNC: 59 U/L — SIGNIFICANT CHANGE UP (ref 30–115)
ALT FLD-CCNC: 7 U/L — SIGNIFICANT CHANGE UP (ref 0–41)
ANION GAP SERPL CALC-SCNC: 10 MMOL/L — SIGNIFICANT CHANGE UP (ref 7–14)
ANISOCYTOSIS BLD QL: SLIGHT — SIGNIFICANT CHANGE UP
AST SERPL-CCNC: 10 U/L — SIGNIFICANT CHANGE UP (ref 0–41)
BASOPHILS # BLD AUTO: 0.02 K/UL — SIGNIFICANT CHANGE UP (ref 0–0.2)
BASOPHILS NFR BLD AUTO: 0.9 % — SIGNIFICANT CHANGE UP (ref 0–1)
BILIRUB SERPL-MCNC: 0.6 MG/DL — SIGNIFICANT CHANGE UP (ref 0.2–1.2)
BUN SERPL-MCNC: 66 MG/DL — CRITICAL HIGH (ref 10–20)
CALCIUM SERPL-MCNC: 8.6 MG/DL — SIGNIFICANT CHANGE UP (ref 8.5–10.1)
CHLORIDE SERPL-SCNC: 108 MMOL/L — SIGNIFICANT CHANGE UP (ref 98–110)
CO2 SERPL-SCNC: 28 MMOL/L — SIGNIFICANT CHANGE UP (ref 17–32)
CREAT SERPL-MCNC: 2.9 MG/DL — HIGH (ref 0.7–1.5)
DACRYOCYTES BLD QL SMEAR: SLIGHT — SIGNIFICANT CHANGE UP
EGFR: 16 ML/MIN/1.73M2 — LOW
ELLIPTOCYTES BLD QL SMEAR: SLIGHT — SIGNIFICANT CHANGE UP
EOSINOPHIL # BLD AUTO: 0.02 K/UL — SIGNIFICANT CHANGE UP (ref 0–0.7)
EOSINOPHIL NFR BLD AUTO: 0.9 % — SIGNIFICANT CHANGE UP (ref 0–8)
GIANT PLATELETS BLD QL SMEAR: PRESENT — SIGNIFICANT CHANGE UP
GLUCOSE BLDC GLUCOMTR-MCNC: 136 MG/DL — HIGH (ref 70–99)
GLUCOSE SERPL-MCNC: 141 MG/DL — HIGH (ref 70–99)
HCT VFR BLD CALC: 26.7 % — LOW (ref 37–47)
HGB BLD-MCNC: 8.2 G/DL — LOW (ref 12–16)
LYMPHOCYTES # BLD AUTO: 0.17 K/UL — LOW (ref 1.2–3.4)
LYMPHOCYTES # BLD AUTO: 7 % — LOW (ref 20.5–51.1)
MAGNESIUM SERPL-MCNC: 2.1 MG/DL — SIGNIFICANT CHANGE UP (ref 1.8–2.4)
MANUAL SMEAR VERIFICATION: SIGNIFICANT CHANGE UP
MCHC RBC-ENTMCNC: 28.8 PG — SIGNIFICANT CHANGE UP (ref 27–31)
MCHC RBC-ENTMCNC: 30.7 G/DL — LOW (ref 32–37)
MCV RBC AUTO: 93.7 FL — SIGNIFICANT CHANGE UP (ref 81–99)
MICROCYTES BLD QL: SLIGHT — SIGNIFICANT CHANGE UP
MONOCYTES # BLD AUTO: 0 K/UL — LOW (ref 0.1–0.6)
MONOCYTES NFR BLD AUTO: 0 % — LOW (ref 1.7–9.3)
NEUTROPHILS # BLD AUTO: 2.22 K/UL — SIGNIFICANT CHANGE UP (ref 1.4–6.5)
NEUTROPHILS NFR BLD AUTO: 90.3 % — HIGH (ref 42.2–75.2)
NEUTS BAND # BLD: 0.9 % — SIGNIFICANT CHANGE UP (ref 0–6)
PLAT MORPH BLD: NORMAL — SIGNIFICANT CHANGE UP
PLATELET # BLD AUTO: 126 K/UL — LOW (ref 130–400)
POIKILOCYTOSIS BLD QL AUTO: SLIGHT — SIGNIFICANT CHANGE UP
POLYCHROMASIA BLD QL SMEAR: SLIGHT — SIGNIFICANT CHANGE UP
POTASSIUM SERPL-MCNC: 5.2 MMOL/L — HIGH (ref 3.5–5)
POTASSIUM SERPL-SCNC: 5.2 MMOL/L — HIGH (ref 3.5–5)
PROT SERPL-MCNC: 5.5 G/DL — LOW (ref 6–8)
RBC # BLD: 2.85 M/UL — LOW (ref 4.2–5.4)
RBC # FLD: 18.9 % — HIGH (ref 11.5–14.5)
RBC BLD AUTO: ABNORMAL
SODIUM SERPL-SCNC: 146 MMOL/L — SIGNIFICANT CHANGE UP (ref 135–146)
WBC # BLD: 2.43 K/UL — LOW (ref 4.8–10.8)
WBC # FLD AUTO: 2.43 K/UL — LOW (ref 4.8–10.8)

## 2022-03-11 PROCEDURE — 71045 X-RAY EXAM CHEST 1 VIEW: CPT | Mod: 26

## 2022-03-11 PROCEDURE — 99233 SBSQ HOSP IP/OBS HIGH 50: CPT

## 2022-03-11 RX ADMIN — PANTOPRAZOLE SODIUM 40 MILLIGRAM(S): 20 TABLET, DELAYED RELEASE ORAL at 06:56

## 2022-03-11 RX ADMIN — CHLORHEXIDINE GLUCONATE 1 APPLICATION(S): 213 SOLUTION TOPICAL at 17:21

## 2022-03-11 RX ADMIN — Medication 25 MILLIGRAM(S): at 17:20

## 2022-03-11 RX ADMIN — PANTOPRAZOLE SODIUM 40 MILLIGRAM(S): 20 TABLET, DELAYED RELEASE ORAL at 17:21

## 2022-03-11 RX ADMIN — Medication 25 MILLIGRAM(S): at 21:33

## 2022-03-11 RX ADMIN — Medication 40 MILLIGRAM(S): at 06:56

## 2022-03-11 RX ADMIN — Medication 25 MILLIGRAM(S): at 00:01

## 2022-03-11 RX ADMIN — Medication 25 MILLIGRAM(S): at 06:57

## 2022-03-11 RX ADMIN — Medication 50 MILLIGRAM(S): at 06:56

## 2022-03-11 NOTE — PROGRESS NOTE ADULT - SUBJECTIVE AND OBJECTIVE BOX
Nephrology progress note    Patient was seen and examined, events over the last 24 h noted .  Cr stable    Allergies:  aspirin (Rash)  latex (Unknown)  penicillins (Unknown)    Hospital Medications:   MEDICATIONS  (STANDING):  chlorhexidine 4% Liquid 1 Application(s) Topical <User Schedule>  metoprolol tartrate 25 milliGRAM(s) Oral two times a day  pantoprazole    Tablet 40 milliGRAM(s) Oral two times a day  predniSONE   Tablet 50 milliGRAM(s) Oral daily        VITALS:  T(F): 97.2 (22 @ 07:50), Max: 97.3 (03-10-22 @ 17:18)  HR: 65 (22 @ 07:50)  BP: 132/62 (22 @ 07:50)  RR: 18 (22 @ 07:50)  SpO2: 97% (22 @ 00:00)  Wt(kg): --     @ 07:01  -   @ 14:50  --------------------------------------------------------  IN: 0 mL / OUT: 265 mL / NET: -265 mL          PHYSICAL EXAM:  Constitutional: NAD  HEENT: anicteric sclera, oropharynx clear, MMM  Neck: No JVD  Respiratory: CTAB, no wheezes, rales or rhonchi  Cardiovascular: S1, S2, RRR  Gastrointestinal: BS+, soft, NT/ND  Extremities: No cyanosis or clubbing. No peripheral edema  :  No culver.   Skin: No rashes    LABS:      146  |  108  |  66<HH>  ----------------------------<  141<H>  5.2<H>   |  28  |  2.9<H>    Ca    8.6      11 Mar 2022 07:04  Mg     2.1         TPro  5.5<L>  /  Alb  3.3<L>  /  TBili  0.6  /  DBili      /  AST  10  /  ALT  7   /  AlkPhos  59                            8.2    2.43  )-----------( 126      ( 11 Mar 2022 07:04 )             26.7       Urine Studies:  Urinalysis Basic - ( 10 Mar 2022 18:13 )    Color: Light Yellow / Appearance: Clear / S.013 / pH:   Gluc:  / Ketone: Negative  / Bili: Negative / Urobili: <2 mg/dL   Blood:  / Protein: Negative / Nitrite: Negative   Leuk Esterase: Negative / RBC:  / WBC    Sq Epi:  / Non Sq Epi:  / Bacteria:         RADIOLOGY & ADDITIONAL STUDIES:

## 2022-03-11 NOTE — DISCHARGE NOTE PROVIDER - HOSPITAL COURSE
Ms. Marroquin is a 76 year old (ex smoker) female patient known to have Severe AS, Anemia Likely Secondary to obscure GI bleeding (from Duodenum and Gastric AVM per recent capsule endoscopy 02/2022 after presenting with melena), Fe Deficiency (TFN S% 5 in 2020 and ferritin 28 in 2022), and CKD per Dr Silver, Moderate Diverticulosis and Grade II Internal Hemorrhoids on colonoscopy 10/22/2019, Erosive Gastritis on EGD 01/09/2020, Hypertension, CKD, Sciatica presented to the ED on 03/04 for evaluation of low Hb 4.5 on outpatient labs.  History goes back to yesterday when the patient had labs at [facility].  Hb came back as 4.5 today so Dr Silver asked her to come to ED.  Patient reports episodes of substernal chest discomfort and shortness of breath on minimal exertion associated with worsening leg swelling, orthopneas, and PNDs.  She denies light headedness, palpitations, syncope, falls, or diaphoresis.  She also denies any hematemesis, melena, hematochezia, or hemoptysis. Passing dark brown bowel movements almost on a daily basis (last time admitted with back stools was in 02/2022)    On review of systems, patient denies any recent fever, chills, night sweats, URTI symptoms (cough, rhinorrhea, sore throat), urinary symptoms (urinary frequency, urgency, intermittence, dysuria, foul smelling urine, cloudy urine), abdominal pain, headache, nausea, or vomiting.   No sick contacts.   No recent travel or exposure to recent travelers.    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 Bleeding in setting of CKD and DARRIN  #Moderate Diverticulosis   #Grade II Internal Hemorrhoids  #Erosive Gastritis  * Anemia Likely Secondary to obscure GI bleeding (from Duodenum and Gastric AVM per recent capsule endoscopy 02/2022 after presenting with melena), Fe Deficiency (TFN S% 5 in 2020 and ferritin 28 in 2022), and CKD per Dr Silver  * Receives Transfusions q1-2 weeks and Retacrit 49921 units weekly  * Recent Admission in 02/2022 for melena: s/p capsule endoscopy showing bleeding from Duodenum and Gastric AVM   * Moderate Diverticulosis and Grade II Internal Hemorrhoids on colonoscopy 10/22/2019  * Erosive Gastritis on EGD 01/09/2020  * Hemodynamically stable in ED, OLEKSANDR with brown stool, Hb 4.5 s/p 3 units pRBC and lasix IV     - GI team consulted: per previous admission and Dr Paula's risk stratification, patient at high risk for endoscopic interventions in setting of severe AS. pt would only want endoscopic intervention for overt evidence of bleeding  - s/p 4 u pRBC in total  - keep Hgb >8, active T/S  - Trend CBC closely and transfuse as needed (avoid volume overload with diuresis but keep an eye on BP in setting of severe AS)  - c/w PPI IV q12-->PPI PO 12 3/10  - monitor BM  - LDH, retic count elevated. haptoglobin wnl  - retacrit 20,000unit x 1 dose 3/7 and venofer 200mg x 3 doses starting 3/7 as per heme/onc recs  - f/u wvf antigen 198, vwf activity 188, and vf multimers, r/o Heyde syndrome  - as per discussion with GI fellow 3/9, pt does not have any signs of active bleeding, no acute interventions  - pt at high risk for rebleed, nephro recommending consulting advanced GI. will need outpatient follow up with GI    #Acute HFpEF  #severe AS  * Last TTE 02/17/22 EF 74%, severe AS with A 0.8cm2 and mean P 48.9,  mild MR  Acute on Chronic HFmEF Exacerbation  * Cardiologist Dr Paula  * PE leg swelling and diffuse crackles; CXR with Bilateral perihilar and lower lobe opacities and effusions, left greater than right.  * S/P IV Lasix 40mg x1 dose in ED  * BNP 3116    - Cardiology Dr Paula consulted for discussion of TAVR/ SAVR in setting of severe AS and high risk for endoscopic procedures (patient needs a solution for GI bleeding which would only be achieved with an endoscopic intervention)  - Monitor in/out  - Daily weight (standing)  - Monitor SaO2 and O2 requirements  - Salt restricted diet and Fluid restriction to 1.2L per 24 hours  - cardio consult: cath and possible TAVR potentially in future once active GI bleed, anemia, and EVAN resolved  - lasix 40mg IV q12 -> changed to IV lasix 40mg 3/8  - discussed with cardiology fellow 3/9, pt will require medical optimization before cardio can work her up for potential cath/TAVR in the future  - primafit placed for accurate in's/outs, maintain negative balance  - f/u HF CONSULT    #EVAN on CKD4 vs progressive CKD- likely CRS- improving  CKD Progression Versus Cardiorenal (volume overload) or Prerenal EVAN (slow GI bleeding)  * Baseline Cr 1.7-2.2 2022  * Recent admission with Cr 2.7-2.9 throughout  * ED BUN 60, Cr 2.7    - U/S: no hydronephrosis  - c/w diuresis for volume overload and monitor  - c/w Diuresis as above. spoke with RN staff at [facility]. pt was not on any diuretics there  - monitor BMP  - renal/bladder U/S- no stones or hydro  - renal consult: CRS, r/o AIN. f/u urinalysis (negative), urine esoinophils, urine Na, urine Cr    #Hypertension  * Home lopressor 25mg BID  * ED /44mmHg  - Monitor BP and avoid hypotension in setting of severe AS  - Resume home anti HTN with holding parameters  - Diuretics as above    #Thrombocytopenia- improving    #Sciatica  - PT once more euvolemic  - Pain control    #Rash-possibly allergic- improving  - s/p prednisone 20mg x2 doses 3/6 and 3/8  - pt reports that she always gets premedicated before getting blood transfusions  - in setting of ruling out AIN, will give prednisone 50mg x 5 days (started 3/10)    #Misc:  - DVT Prophylaxis: SCDs  - GI Prophylaxis: protonix IV BID  - Diet: soft and bite sized, 1.2L fluid restriction  - Code Status: Full  - Dispo: [facility]  - Pending: vwf studies, Cr, nephro f/u, HF consult, urine studies     Ms. Marroquin is a 76 year old (ex smoker) female patient known to have Severe AS, Anemia Likely Secondary to obscure GI bleeding (from Duodenum and Gastric AVM per recent capsule endoscopy 02/2022 after presenting with melena), Fe Deficiency (TFN S% 5 in 2020 and ferritin 28 in 2022), and CKD per Dr Silver, Moderate Diverticulosis and Grade II Internal Hemorrhoids on colonoscopy 10/22/2019, Erosive Gastritis on EGD 01/09/2020, Hypertension, CKD, Sciatica presented to the ED on 03/04 for evaluation of low Hb 4.5 on outpatient labs.  History goes back to yesterday when the patient had labs at [facility].  Hb came back as 4.5 today so Dr Silver asked her to come to ED.  Patient reports episodes of substernal chest discomfort and shortness of breath on minimal exertion associated with worsening leg swelling, orthopneas, and PNDs.  She denies light headedness, palpitations, syncope, falls, or diaphoresis.  She also denies any hematemesis, melena, hematochezia, or hemoptysis. Passing dark brown bowel movements almost on a daily basis (last time admitted with back stools was in 02/2022)    On review of systems, patient denies any recent fever, chills, night sweats, URTI symptoms (cough, rhinorrhea, sore throat), urinary symptoms (urinary frequency, urgency, intermittence, dysuria, foul smelling urine, cloudy urine), abdominal pain, headache, nausea, or vomiting.   No sick contacts.   No recent travel or exposure to recent travelers.    Pt admitted for acute on chronic drop in hemoglobin in setting of DARRIN likely secondary to duodenal and gastric AVM bleeding (diagnosed on capsule endoscopy on prior admission) and CKD. GI was consulted- pt at high risk for endoscopic intervention sin setting of severe AS and after further discussion with the pt and pt's son, pt only would want endoscopic procedure if there were any overt signs of bleeding. She is s/p 4u pRBC in total during her hospitalization. Pt at high risk for rebleed from AVM (no signs of active bleeding, Hgb stable). Pt will need outpatient follow up with GI.   Heme/onc saw the pt (sees Dr. Gaston outpatient): s/p retacrit 20,000units x 1 dose 3/8 and s/p 3 doses of Venofer 200mg. Will need outpatient heme/onc follow up to follow up on vwf workup.     On admission, pt also had EVAN on CKD, likely CRS. Pt was being treated for acute HFpEF with IV lasix and changed to torsemide 20mg daily as per heart failure recs. PT near euvolemic now, Cr improved.   Cardiology saw the pt: cath and possible TAVR potentially in future once anemia, GI bleed, and EVAN resolved. Will need outpatient cardio and HF f/u.   Nephrology evaluated pt- AIN ruled out (pt developed an allergic dermatitis during hospital stay secondary to blood transfusions- treated with prednisone PO x 5 days)    Pt medically stable to be discharged to [facility].

## 2022-03-11 NOTE — DISCHARGE NOTE PROVIDER - PROVIDER TOKENS
PROVIDER:[TOKEN:[[id number]],FOLLOWUP:[1 week]] PROVIDER:[TOKEN:[[id number]],FOLLOWUP:[1 week]],PROVIDER:[TOKEN:[[id number]],FOLLOWUP:[2 weeks]],PROVIDER:[TOKEN:[[id number]],FOLLOWUP:[2 weeks]],PROVIDER:[TOKEN:[[id number]],FOLLOWUP:[2 weeks]]

## 2022-03-11 NOTE — PROGRESS NOTE ADULT - ASSESSMENT
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 Bleeding in setting of CKD and DARRIN  #Moderate Diverticulosis   #Grade II Internal Hemorrhoids  #Erosive Gastritis  * Anemia Likely Secondary to obscure GI bleeding (from Duodenum and Gastric AVM per recent capsule endoscopy 02/2022 after presenting with melena), Fe Deficiency (TFN S% 5 in 2020 and ferritin 28 in 2022), and CKD per Dr Silver  * Receives Transfusions q1-2 weeks and Retacrit 55735 units weekly  * Recent Admission in 02/2022 for melena: s/p capsule endoscopy showing bleeding from Duodenum and Gastric AVM   * Moderate Diverticulosis and Grade II Internal Hemorrhoids on colonoscopy 10/22/2019  * Erosive Gastritis on EGD 01/09/2020  * Hemodynamically stable in ED, OLEKSANDR with brown stool, Hb 4.5 s/p 3 units pRBC and lasix IV     - GI team consulted: per previous admission and Dr Paula's risk stratification, patient at high risk for endoscopic interventions in setting of severe AS. pt would only want endoscopic intervention for overt evidence of bleeding  - s/p 4 u pRBC in total  - keep Hgb >8, active T/S  - Trend CBC closely and transfuse as needed (avoid volume overload with diuresis but keep an eye on BP in setting of severe AS)  - c/w PPI IV q12-->PPI PO 12 3/10  - monitor BM  - LDH, retic count elevated. haptoglobin wnl  - retacrit 20,000unit x 1 dose 3/7 and venofer 200mg x 3 doses starting 3/7 as per heme/onc recs  - f/u wvf antigen 198, vwf activity 188, and vf multimers, r/o Heyde syndrome  - as per discussion with GI fellow 3/9, pt does not have any signs of active bleeding, no acute interventions  - pt at high risk for rebleed, nephro recommending consulting advanced GI    #Acute HFpEF  #severe AS  * Last TTE 02/17/22 EF 74%, severe AS with A 0.8cm2 and mean P 48.9,  mild MR  Acute on Chronic HFmEF Exacerbation  * Cardiologist Dr Puala  * PE leg swelling and diffuse crackles; CXR with Bilateral perihilar and lower lobe opacities and effusions, left greater than right.  * S/P IV Lasix 40mg x1 dose in ED  * BNP 3116    - Cardiology Dr Paula consulted for discussion of TAVR/ SAVR in setting of severe AS and high risk for endoscopic procedures (patient needs a solution for GI bleeding which would only be achieved with an endoscopic intervention)  - Monitor in/out  - Daily weight (standing)  - Monitor SaO2 and O2 requirements  - Salt restricted diet and Fluid restriction to 1.2L per 24 hours  - cardio consult: cath and possible TAVR potentially in future once active GI bleed, anemia, and EVAN resolved  - lasix 40mg IV q12 -> changed to IV lasix 40mg 3/8  - maintain negative balance  - discussed with cardiology fellow 3/9, pt will require medical optimization before cardio can work her up for potential cath/TAVR in the future  - f/u HF CONSULT    #EVAN on CKD4 vs progressive CKD- likely CRS- improving  CKD Progression Versus Cardiorenal (volume overload) or Prerenal EVAN (slow GI bleeding)  * Baseline Cr 1.7-2.2 2022  * Recent admission with Cr 2.7-2.9 throughout  * ED BUN 60, Cr 2.7    - U/S: no hydronephrosis  - c/w diuresis for volume overload and monitor  - c/w Diuresis as above. spoke with RN staff at [facility]. pt was not on any diuretics there  - monitor BMP  - renal/bladder U/S- no stones or hydro  - renal consult: CRS, r/o AIN. f/u urinalysis (negative), urine esoinophils, urine Na, urine Cr    #Hypertension  * Home lopressor 25mg BID  * ED /44mmHg  - Monitor BP and avoid hypotension in setting of severe AS  - Resume home anti HTN with holding parameters  - Diuretics as above    #Thrombocytopenia- improving    #Sciatica  - PT once more euvolemic  - Pain control    #Rash-possibly allergic  - s/p prednisone 20mg x2 doses 3/6 and 3/8  - pt reports that she always gets premedicated before getting blood transfusions  - in setting of ruling out AIN, will give prednisone 50mg x 5 days (started 3/10)    #Misc:  - DVT Prophylaxis: SCDs  - GI Prophylaxis: protonix IV BID  - Diet: soft and bite sized, 1.2L fluid restriction  - Code Status: Full  - Dispo: [facility]  - Pending: vwf studies, Cr, nephro f/u, HF consult   76 year old female patient with multiple comorbidities including severe AS, HTN, CKD, sciatica, and anemia recently found to have evidence of 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 Bleeding in setting of CKD and DARRIN  #Moderate Diverticulosis   #Grade II Internal Hemorrhoids  #Erosive Gastritis  * Anemia Likely Secondary to obscure GI bleeding (from Duodenum and Gastric AVM per recent capsule endoscopy 02/2022 after presenting with melena), Fe Deficiency (TFN S% 5 in 2020 and ferritin 28 in 2022), and CKD per Dr Silver  * Receives Transfusions q1-2 weeks and Retacrit 00836 units weekly  * Recent Admission in 02/2022 for melena: s/p capsule endoscopy showing bleeding from Duodenum and Gastric AVM   * Moderate Diverticulosis and Grade II Internal Hemorrhoids on colonoscopy 10/22/2019  * Erosive Gastritis on EGD 01/09/2020  * Hemodynamically stable in ED, OLEKSANDR with brown stool, Hb 4.5 s/p 3 units pRBC and lasix IV     - GI team consulted: per previous admission and Dr Paula's risk stratification, patient at high risk for endoscopic interventions in setting of severe AS. pt would only want endoscopic intervention for overt evidence of bleeding  - s/p 4 u pRBC in total  - keep Hgb >8, active T/S  - Trend CBC closely and transfuse as needed (avoid volume overload with diuresis but keep an eye on BP in setting of severe AS)  - c/w PPI IV q12-->PPI PO 12 3/10  - monitor BM  - LDH, retic count elevated. haptoglobin wnl  - retacrit 20,000unit x 1 dose 3/7 and venofer 200mg x 3 doses starting 3/7 as per heme/onc recs  - f/u wvf antigen 198, vwf activity 188, and vf multimers, r/o Heyde syndrome  - as per discussion with GI fellow 3/9, pt does not have any signs of active bleeding, no acute interventions  - pt at high risk for rebleed, nephro recommending consulting advanced GI. will need outpatient follow up with GI    #Acute HFpEF  #severe AS  * Last TTE 02/17/22 EF 74%, severe AS with A 0.8cm2 and mean P 48.9,  mild MR  Acute on Chronic HFmEF Exacerbation  * Cardiologist Dr Paula  * PE leg swelling and diffuse crackles; CXR with Bilateral perihilar and lower lobe opacities and effusions, left greater than right.  * S/P IV Lasix 40mg x1 dose in ED  * BNP 3116    - Cardiology Dr Paula consulted for discussion of TAVR/ SAVR in setting of severe AS and high risk for endoscopic procedures (patient needs a solution for GI bleeding which would only be achieved with an endoscopic intervention)  - Monitor in/out  - Daily weight (standing)  - Monitor SaO2 and O2 requirements  - Salt restricted diet and Fluid restriction to 1.2L per 24 hours  - cardio consult: cath and possible TAVR potentially in future once active GI bleed, anemia, and EVAN resolved  - lasix 40mg IV q12 -> changed to IV lasix 40mg 3/8  - discussed with cardiology fellow 3/9, pt will require medical optimization before cardio can work her up for potential cath/TAVR in the future  - primafit placed for accurate in's/outs, maintain negative balance  - f/u HF CONSULT    #EVAN on CKD4 vs progressive CKD- likely CRS- improving  CKD Progression Versus Cardiorenal (volume overload) or Prerenal EVAN (slow GI bleeding)  * Baseline Cr 1.7-2.2 2022  * Recent admission with Cr 2.7-2.9 throughout  * ED BUN 60, Cr 2.7    - U/S: no hydronephrosis  - c/w diuresis for volume overload and monitor  - c/w Diuresis as above. spoke with RN staff at [facility]. pt was not on any diuretics there  - monitor BMP  - renal/bladder U/S- no stones or hydro  - renal consult: CRS, r/o AIN. f/u urinalysis (negative), urine esoinophils, urine Na, urine Cr    #Hypertension  * Home lopressor 25mg BID  * ED /44mmHg  - Monitor BP and avoid hypotension in setting of severe AS  - Resume home anti HTN with holding parameters  - Diuretics as above    #Thrombocytopenia- improving    #Sciatica  - PT once more euvolemic  - Pain control    #Rash-possibly allergic- improving  - s/p prednisone 20mg x2 doses 3/6 and 3/8  - pt reports that she always gets premedicated before getting blood transfusions  - in setting of ruling out AIN, will give prednisone 50mg x 5 days (started 3/10)    #Misc:  - DVT Prophylaxis: SCDs  - GI Prophylaxis: protonix IV BID  - Diet: soft and bite sized, 1.2L fluid restriction  - Code Status: Full  - Dispo: [facility]  - Pending: vwf studies, Cr, nephro f/u, HF consult   76 year old female patient with multiple comorbidities including severe AS, HTN, CKD, sciatica, and anemia recently found to have evidence of 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 Bleeding in setting of CKD and DARRIN  #Moderate Diverticulosis   #Grade II Internal Hemorrhoids  #Erosive Gastritis  * Anemia Likely Secondary to obscure GI bleeding (from Duodenum and Gastric AVM per recent capsule endoscopy 02/2022 after presenting with melena), Fe Deficiency (TFN S% 5 in 2020 and ferritin 28 in 2022), and CKD per Dr Silver  * Receives Transfusions q1-2 weeks and Retacrit 07702 units weekly  * Recent Admission in 02/2022 for melena: s/p capsule endoscopy showing bleeding from Duodenum and Gastric AVM   * Moderate Diverticulosis and Grade II Internal Hemorrhoids on colonoscopy 10/22/2019  * Erosive Gastritis on EGD 01/09/2020  * Hemodynamically stable in ED, OLEKSANDR with brown stool, Hb 4.5 s/p 3 units pRBC and lasix IV     - GI team consulted: per previous admission and Dr Paula's risk stratification, patient at high risk for endoscopic interventions in setting of severe AS. pt would only want endoscopic intervention for overt evidence of bleeding  - s/p 4 u pRBC in total  - keep Hgb >8, active T/S  - Trend CBC closely and transfuse as needed (avoid volume overload with diuresis but keep an eye on BP in setting of severe AS)  - c/w PPI IV q12-->PPI PO 12 3/10  - monitor BM  - LDH, retic count elevated. haptoglobin wnl  - retacrit 20,000unit x 1 dose 3/7 and venofer 200mg x 3 doses starting 3/7 as per heme/onc recs  - f/u wvf antigen 198, vwf activity 188, and vf multimers, r/o Heyde syndrome  - as per discussion with GI fellow 3/9, pt does not have any signs of active bleeding, no acute interventions  - pt at high risk for rebleed, nephro recommending consulting advanced GI. will need outpatient follow up with GI    #Acute HFpEF  #severe AS  * Last TTE 02/17/22 EF 74%, severe AS with A 0.8cm2 and mean P 48.9,  mild MR  Acute on Chronic HFmEF Exacerbation  * Cardiologist Dr Paula  * PE leg swelling and diffuse crackles; CXR with Bilateral perihilar and lower lobe opacities and effusions, left greater than right.  * S/P IV Lasix 40mg x1 dose in ED  * BNP 3116    - Cardiology Dr Paula consulted for discussion of TAVR/ SAVR in setting of severe AS and high risk for endoscopic procedures (patient needs a solution for GI bleeding which would only be achieved with an endoscopic intervention)  - Monitor in/out  - Daily weight (standing)  - Monitor SaO2 and O2 requirements  - Salt restricted diet and Fluid restriction to 1.2L per 24 hours  - cardio consult: cath and possible TAVR potentially in future once active GI bleed, anemia, and EVAN resolved  - lasix 40mg IV q12 -> changed to IV lasix 40mg 3/8  - discussed with cardiology fellow 3/9, pt will require medical optimization before cardio can work her up for potential cath/TAVR in the future  - primafit placed for accurate in's/outs, maintain negative balance  - f/u HF CONSULT    #EVAN on CKD4 vs progressive CKD- likely CRS- improving  CKD Progression Versus Cardiorenal (volume overload) or Prerenal EVAN (slow GI bleeding)  * Baseline Cr 1.7-2.2 2022  * Recent admission with Cr 2.7-2.9 throughout  * ED BUN 60, Cr 2.7    - U/S: no hydronephrosis  - c/w diuresis for volume overload and monitor  - c/w Diuresis as above. spoke with RN staff at [facility]. pt was not on any diuretics there  - monitor BMP  - renal/bladder U/S- no stones or hydro  - renal consult: CRS, r/o AIN. f/u urinalysis (negative), urine esoinophils, urine Na, urine Cr    #Hypertension  * Home lopressor 25mg BID  * ED /44mmHg  - Monitor BP and avoid hypotension in setting of severe AS  - Resume home anti HTN with holding parameters  - Diuretics as above    #Thrombocytopenia- improving    #Sciatica  - PT once more euvolemic  - Pain control    #Rash-possibly allergic- improving  - s/p prednisone 20mg x2 doses 3/6 and 3/8  - pt reports that she always gets premedicated before getting blood transfusions  - in setting of ruling out AIN, will give prednisone 50mg x 5 days (started 3/10)    #Misc:  - DVT Prophylaxis: SCDs  - GI Prophylaxis: protonix IV BID  - Diet: soft and bite sized, 1.2L fluid restriction  - Code Status: Full  - Dispo: [facility]  - Pending: vwf studies, Cr, nephro f/u, HF consult, urine studies   76 year old female patient with multiple comorbidities including severe AS, HTN, CKD, sciatica, and anemia recently found to have evidence of 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 Bleeding in setting of CKD and DARRIN  #Moderate Diverticulosis   #Grade II Internal Hemorrhoids  #Erosive Gastritis  * Anemia Likely Secondary to obscure GI bleeding (from Duodenum and Gastric AVM per recent capsule endoscopy 02/2022 after presenting with melena), Fe Deficiency (TFN S% 5 in 2020 and ferritin 28 in 2022), and CKD per Dr Silver  * Receives Transfusions q1-2 weeks and Retacrit 32970 units weekly  * Recent Admission in 02/2022 for melena: s/p capsule endoscopy showing bleeding from Duodenum and Gastric AVM   * Moderate Diverticulosis and Grade II Internal Hemorrhoids on colonoscopy 10/22/2019  * Erosive Gastritis on EGD 01/09/2020  * Hemodynamically stable in ED, OLEKSANDR with brown stool, Hb 4.5 s/p 3 units pRBC and lasix IV     - GI team consulted: per previous admission and Dr Paula's risk stratification, patient at high risk for endoscopic interventions in setting of severe AS. pt would only want endoscopic intervention for overt evidence of bleeding  - s/p 4 u pRBC in total  - keep Hgb >8, active T/S  - Trend CBC closely and transfuse as needed (avoid volume overload with diuresis but keep an eye on BP in setting of severe AS)  - c/w PPI IV q12-->PPI PO 12 3/10  - monitor BM  - LDH, retic count elevated. haptoglobin wnl  - retacrit 20,000unit x 1 dose 3/7 and venofer 200mg x 3 doses starting 3/7 as per heme/onc recs  - f/u wvf antigen 198, vwf activity 188, and vf multimers, r/o Heyde syndrome  - as per discussion with GI fellow 3/9, pt does not have any signs of active bleeding, no acute interventions  - pt at high risk for rebleed, nephro recommending consulting advanced GI. will need outpatient follow up with GI    #Acute HFpEF  #severe AS  * Last TTE 02/17/22 EF 74%, severe AS with A 0.8cm2 and mean P 48.9,  mild MR  Acute on Chronic HFmEF Exacerbation  * Cardiologist Dr Paula  * PE leg swelling and diffuse crackles; CXR with Bilateral perihilar and lower lobe opacities and effusions, left greater than right.  * S/P IV Lasix 40mg x1 dose in ED  * BNP 3116    - Cardiology Dr Paula consulted for discussion of TAVR/ SAVR in setting of severe AS and high risk for endoscopic procedures (patient needs a solution for GI bleeding which would only be achieved with an endoscopic intervention)  - Monitor in/out  - Daily weight (standing)  - Monitor SaO2 and O2 requirements  - Salt restricted diet and Fluid restriction to 1.2L per 24 hours  - cardio consult: cath and possible TAVR potentially in future once active GI bleed, anemia, and EVAN resolved  - lasix 40mg IV q12 -> changed to IV lasix 40mg 3/8  - discussed with cardiology fellow 3/9, pt will require medical optimization before cardio can work her up for potential cath/TAVR in the future  - primafit placed for accurate in's/outs, maintain negative balance  - c/w metoprolol 25 BID  - f/u HF CONSULT    #EVAN on CKD4 vs progressive CKD- likely CRS- improving  CKD Progression Versus Cardiorenal (volume overload) or Prerenal EVAN (slow GI bleeding)  * Baseline Cr 1.7-2.2 2022  * Recent admission with Cr 2.7-2.9 throughout  * ED BUN 60, Cr 2.7    - U/S: no hydronephrosis  - c/w diuresis for volume overload and monitor  - c/w Diuresis as above. spoke with RN staff at [facility]. pt was not on any diuretics there  - monitor BMP  - renal/bladder U/S- no stones or hydro  - renal consult: CRS, r/o AIN. f/u urinalysis (negative), urine esoinophils, urine Na, urine Cr    #Hypertension  * Home lopressor 25mg BID  * ED /44mmHg  - Monitor BP and avoid hypotension in setting of severe AS  - Resume home anti HTN with holding parameters  - Diuretics as above    #Thrombocytopenia- improving    #Sciatica  - PT once more euvolemic  - Pain control    #Rash-possibly allergic- improving  - s/p prednisone 20mg x2 doses 3/6 and 3/8  - pt reports that she always gets premedicated before getting blood transfusions  - in setting of ruling out AIN, will give prednisone 50mg x 5 days (started 3/10)    #Misc:  - DVT Prophylaxis: SCDs  - GI Prophylaxis: protonix IV BID  - Diet: soft and bite sized, 1.2L fluid restriction  - Code Status: Full  - Dispo: [facility]  - Pending: vwf studies, Cr, nephro f/u, HF consult, urine studies

## 2022-03-11 NOTE — PROGRESS NOTE ADULT - SUBJECTIVE AND OBJECTIVE BOX
Progress Note:  Provider Speciality                            Hospitalist      LATRICIA ARREAGA [id number] 76y Female     CHIEF PRESENTING COMPLAINT:  Patient is a 76y old  Female who presents with a chief complaint of Low Hb (11 Mar 2022 14:48)        SUBJECTIVE:  Patient was seen and examined at bedside. Reports improvement in  presenting complaint.   No significant overnight events reported.     HISTORY OF PRESENTING ILLNESS:  HPI:  Location: [facility] ER Hold 020 A ([facility] ER Hold)  Patient Name: LATRICIA ARREAGA  Age: 76y  Gender: Female      History of Present Illness    Ms. Arreaga is a 76 year old (ex smoker) female patient known to have:  - Severe AS. Last TTE 02/17/22 EF 74%, severe AS with A 0.8cm2 and mean P 48.9,  mild MR  - Anemia Likely Secondary to obscure GI bleeding (from Duodenum and Gastric AVM per recent capsule endoscopy 02/2022 after presenting with melena), Fe Deficiency (TFN S% 5 in 2020 and ferritin 28 in 2022), and CKD per Dr Silver. Received Transfusions q1-2 weeks and Retacrit 52096 units weekly  - Moderate Diverticulosis and Grade II Internal Hemorrhoids on colonoscopy 10/22/2019  - Erosive Gastritis on EGD 01/09/2020  - Hypertension  - CKD. Baseline Cr 1.7-2.2 2022  - Sciatica      She presented to the ED on 03/04 for evaluation of low Hb 4.5 on outpatient labs.  History goes back to yesterday when the patient had labs at [facility].  Hb came back as 4.5 today so Dr Silver asked her to come to ED.  Patient reports episodes of substernal chest discomfort and shortness of breath on minimal exertion associated with worsening leg swelling, orthopneas, and PNDs.  She denies light headedness, palpitations, syncope, falls, or diaphoresis.  She also denies any hematemesis, melena, hematochezia, or hemoptysis. Passing dark brown bowel movements almost on a daily basis (last time admitted with back stools was in 02/2022)      On review of systems, patient denies any recent fever, chills, night sweats, URTI symptoms (cough, rhinorrhea, sore throat), urinary symptoms (urinary frequency, urgency, intermittence, dysuria, foul smelling urine, cloudy urine), abdominal pain, headache, nausea, or vomiting.   No sick contacts.   No recent travel or exposure to recent travelers.      Upon presentation to the ED, the patient was hemodynamically stable:  Vital Signs in ED   - /44 mmHg  - HR 74 bpm  - RR 20 bpm  - T 97 degrees  - SaO2 99% on RA    Investigations   Laboratory Workup  - CBC:                        4.5    4.54  )-----------( 134      ( 04 Mar 2022 12:30 )             15.5     - Chemistry:  03-04    139  |  106  |  60<H>  ----------------------------<  94  5.3<H>   |  25  |  2.7<H>    Ca    8.3<L>      04 Mar 2022 12:30    TPro  5.7<L>  /  Alb  3.3<L>  /  TBili  0.6  /  DBili  x   /  AST  13  /  ALT  6   /  AlkPhos  66  03-04    - Coagulation Studies:  PT/INR - ( 04 Mar 2022 12:30 )   PT: 11.70 sec;   INR: 1.02 ratio    PTT - ( 04 Mar 2022 12:30 )  PTT:32.0 sec      Microbiological Workup      Radiological Workup  * CXR with Bilateral perihilar and lower lobe opacities and effusions, left greater than right.      - GI team evaluated patient in the ED  - OLEKSANDR with brown stool, no bright red blood  - 3 units of packed RBCs were ordered for patient  - Stat IV lasix 40mg x1 dose was administered (not more since BP on low side and patient has severe AS)  - Patient will be admitted for further investigations, management, and monitoring           (04 Mar 2022 15:57)        REVIEW OF SYSTEMS:  Patient denies any headache, any vision complaints, runny nose, fever, chills. Denies chest pain, shortness of breath, palpitation. Denies nausea, vomiting, abdominal pain or diarrhoea, Denies dysuria. Denies  localized weakness in any part of the body or numbness.   At least 10 systems were reviewed in ROS. All systems reviewed  are within normal limits except for the complaints as described in Subjective.    PAST MEDICAL & SURGICAL HISTORY:  PAST MEDICAL & SURGICAL HISTORY:  HTN (hypertension)    Heart murmur    Eczema    Anemia    Aortic stenosis    H/O appendicitis    H/O cholecystitis            VITAL SIGNS:  Vital Signs Last 24 Hrs  T(C): 36.2 (11 Mar 2022 07:50), Max: 36.3 (10 Mar 2022 17:18)  T(F): 97.2 (11 Mar 2022 07:50), Max: 97.3 (10 Mar 2022 17:18)  HR: 65 (11 Mar 2022 07:50) (65 - 97)  BP: 132/62 (11 Mar 2022 07:50) (112/51 - 133/64)  BP(mean): --  RR: 18 (11 Mar 2022 07:50) (18 - 18)  SpO2: 97% (11 Mar 2022 00:00) (97% - 97%)          PHYSICAL EXAMINATION:  Not in acute distress, obese  General: No icterus  HEENT:   no JVD.  Heart: S1+S2 audible  Lungs: bilateral  moderate air entry, no wheezing, no crepitations.  Abdomen: Soft, non-tender, non-distended , no  rigidity or guarding.  CNS: Awake alert, CN  grossly intact.  Extremities:   edema    LE improved        CONSULTS:  Consultant(s) Notes Reviewed by me.   Care Discussed with Consultants/Other Providers where required.        MEDICATIONS:  MEDICATIONS  (STANDING):  chlorhexidine 4% Liquid 1 Application(s) Topical <User Schedule>  metoprolol tartrate 25 milliGRAM(s) Oral two times a day  pantoprazole    Tablet 40 milliGRAM(s) Oral two times a day  predniSONE   Tablet 50 milliGRAM(s) Oral daily    MEDICATIONS  (PRN):  acetaminophen     Tablet .. 650 milliGRAM(s) Oral every 6 hours PRN Mild Pain (1 - 3)  aluminum hydroxide/magnesium hydroxide/simethicone Suspension 30 milliLiter(s) Oral every 4 hours PRN Dyspepsia  diphenhydrAMINE 25 milliGRAM(s) Oral every 4 hours PRN Rash and/or Itching  ondansetron Injectable 4 milliGRAM(s) IV Push every 8 hours PRN Nausea and/or Vomiting            ASSESSMENT:      75 y/o woman with PMH of severe AS, HTN, CKD 4, sciatica and anemia recently found to have evidence of bleeding from gastric AVM and duodenum on capsule endoscopy and currently on q1-2 weekly transfusions and retacrit weekly now presented to the ED on 03/04 for evaluation of low hemoglobin on outpatient labs.       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 H/H currently stable  GI-As per family wishes holding off on scope unless clinical deterioration  Possible DARRIN- Venofer x 3 days, Received retacrit 78850 U x 1 on 3/7  Severe AS-TAVR deferred for now once active GI bleed, anemia, and EVAN have resolve   EVAN over CKD 4 vs progressive CKD-possible CRS vs AIN. Started on po steroids. Get UA and urine pro/cr, UA for eosinophils   Acute HFpEF / severe AS. Possibly euvolemic now. Switched to Torsemide 20 mg . Lasix discontinued  Continue current medical management for active chronic comorbid conditions.  Protonic switched to PO  DVT Prophylaxis as appropriate  Supportive care including activity, diet, goals of care discussed in AM rounds.  Discussed with  / in AM rounds  Handoff: Disposition: [facility] when stable, not stable for discharge yet in lieu of EVAN, volume optimization

## 2022-03-11 NOTE — DISCHARGE NOTE PROVIDER - NSDCCPCAREPLAN_GEN_ALL_CORE_FT
PRINCIPAL DISCHARGE DIAGNOSIS  Diagnosis: Anemia  Assessment and Plan of Treatment: You were brought in for a low Hgb- 4.5 and you were symptomatic.   You were admitted for acute on chronic drop in hemoglobin in setting of iron deficiency anemia likely secondary to duodenal and gastric AVM bleeding and chronic kidney disease. GI was consulted- you are at high risk for endoscopic interventionsin setting of severe aortic stenosis and after further discussion, you only would want endoscopic procedure if there were any overt signs of bleeding. You were given 4units of blood in total during your hospitalization. You are still at risk for rebleed from the AVM so you will need close follow up with the gastroenterology team outpatient.  Hematology/oncology saw you, recommended to give you retacrit and IV iron sucrose treatment. Will need outpatient heme/onc follow up to follow up on von willebrand factor workup.   On admission, you also had an acute kidney injury on CKD, likely cardiorenal syndrome. You were being treated for acute heart failure exacerbation with IV lasix and changed to torsemide 20mg daily as per heart failure recs. Kidney function improved.   Cardiology saw you: cath and possible TAVR potentially in future once anemia, GI bleed, and EVAN resolvs. Will need outpatient cardio and heart failure follow up.    Nephrology evaluated you- acute interstitial nephritis ruled out and you were treated with prednisone for 5 days for an allergic rash.  Medically stable to be discharged back to nursing home.         SECONDARY DISCHARGE DIAGNOSES  Diagnosis: Acute on chronic heart failure with preserved ejection fraction (HFpEF)  Assessment and Plan of Treatment:     Diagnosis: EVAN (acute kidney injury)  Assessment and Plan of Treatment:     Diagnosis: Allergic rash present on examination  Assessment and Plan of Treatment:      PRINCIPAL DISCHARGE DIAGNOSIS  Diagnosis: Anemia  Assessment and Plan of Treatment: You were brought in for a low Hgb- 4.5 and you were symptomatic.   You were admitted for acute on chronic drop in hemoglobin in setting of iron deficiency anemia likely secondary to duodenal and gastric AVM bleeding and chronic kidney disease. GI was consulted- you are at high risk for endoscopic interventionsin setting of severe aortic stenosis and after further discussion, you only would want endoscopic procedure if there were any overt signs of bleeding. You were given 4units of blood in total during your hospitalization. You are still at risk for rebleed from the AVM so you will need close follow up with the gastroenterology team outpatient.  Hematology/oncology saw you, recommended to give you retacrit and IV iron sucrose treatment. Will need outpatient heme/onc follow up to follow up on von willebrand factor workup.   On admission, you also had an acute kidney injury on CKD, likely cardiorenal syndrome. You were being treated for acute heart failure exacerbation with IV lasix and changed to torsemide 20mg daily as per heart failure recs. Kidney function improved.   Cardiology saw you: cath and possible TAVR potentially in future once anemia, GI bleed, and EVAN resolvs. Will need outpatient cardio and heart failure follow up.    Nephrology evaluated you- acute interstitial nephritis ruled out and you were treated with prednisone for 5 days for an allergic rash.  Medically stable to be discharged back to nursing home.         SECONDARY DISCHARGE DIAGNOSES  Diagnosis: Acute on chronic heart failure with preserved ejection fraction (HFpEF)  Assessment and Plan of Treatment: had an acute kidney injury on CKD, likely cardiorenal syndrome. You were being treated for acute heart failure exacerbation with IV lasix and changed to torsemide 20mg daily as per heart failure recs. Kidney function improved.    Diagnosis: EVAN (acute kidney injury)  Assessment and Plan of Treatment: you had an acute kidney injury on CKD, likely cardiorenal syndrome. You were being treated for acute heart failure exacerbation with IV lasix and changed to torsemide 20mg daily as per heart failure recs. Kidney function improved.    Diagnosis: Allergic rash present on examination  Assessment and Plan of Treatment: you had a rash -> you were started on prednisone with improvement

## 2022-03-11 NOTE — CONSULT NOTE ADULT - SUBJECTIVE AND OBJECTIVE BOX
Date of Admission: 3/4/22    CHIEF COMPLAINT: Patient is a 76y old  Female who presents with a chief complaint of Low Hb (11 Mar 2022 07:12)    HISTORY OF PRESENT ILLNESS:   76 year old (ex smoker) female patient known to have:  - Severe AS. Last TTE 02/17/22 EF 74%, severe AS with A 0.8cm2 and mean P 48.9,  mild MR  - Anemia Likely Secondary to obscure GI bleeding (from Duodenum and Gastric AVM per recent capsule endoscopy 02/2022 after presenting with melena), Fe Deficiency (TFN S% 5 in 2020 and ferritin 28 in 2022), and CKD per Dr Silver. Received Transfusions q1-2 weeks and Retacrit 28244 units weekly  - Moderate Diverticulosis and Grade II Internal Hemorrhoids on colonoscopy 10/22/2019  - Erosive Gastritis on EGD 01/09/2020  - Hypertension  - CKD. Baseline Cr 1.7-2.2 2022  - Sciatica  She presented to the ED on 03/04 for evaluation of low Hb 4.5 on outpatient labs.  History goes back to yesterday when the patient had labs at [facility].  Hb came back as 4.5 today so Dr Silver asked her to come to ED.  Patient reports episodes of substernal chest discomfort and shortness of breath on minimal exertion associated with worsening leg swelling, orthopneas, and PNDs.  * CXR with Bilateral perihilar and lower lobe opacities and effusions, left greater than right.  - GI team evaluated patient in the ED  - OLEKSANDR with brown stool, no bright red blood  - 3 units of packed RBCs were ordered for patient  - Stat IV lasix 40mg x1 dose was administered (not more since BP on low side and patient has severe AS)  - Patient will be admitted for further investigations, management, and monitoring (04 Mar 2022 15:57)      PAST MEDICAL & SURGICAL HISTORY:  HTN (hypertension)  Heart murmur  Eczema  Anemia  Aortic stenosis  H/O appendicitis  H/O cholecystitis        FAMILY HISTORY: No pertinent family history of premature cardiovascular disease in first degree relatives      SOCIAL HISTORY:  Former smoker, denies alcohol or drug use.       Allergies    aspirin (Rash)  latex (Unknown)  penicillins (Unknown)    Intolerances    	    REVIEW OF SYSTEMS:  CONSTITUTIONAL: No fever, weight loss, or fatigue.  CARDIOLOGY: Patient denies chest pain, shortness of breath or syncopal episodes.   RESPIRATORY: denies shortness of breath, wheezing.   NEUROLOGICAL: No weakness, no focal deficits to report.  ENDOCRINOLOGICAL: no recent change in diabetic medications.   GI: no BRBPR, no n/v, diarrhea.    PSYCHIATRY: normal mood and affect  HEENT: no nasal discharge, no ecchymosis  SKIN: no ecchymosis, no breakdown  MUSCULOSKELETAL: Full range of motion x4.     PHYSICAL EXAM:  General Appearance: well appearing, normal for age and gender. 	  Neck: normal JVP, no bruit.   Eyes: Extra Ocular muscles intact.   Cardiovascular: regular rate and rhythm S1 S2, No JVD, No murmurs, No edema  Respiratory: Lungs clear to auscultation	  Psychiatry: Alert and oriented x 3, Mood & affect appropriate  Gastrointestinal:  Soft, Non-tender  Skin/Integumen: No rashes, No ecchymoses, No cyanosis	  Neurologic: Non-focal  Musculoskeletal/ extremities: Normal range of motion, No clubbing, cyanosis or edema  Vascular: Peripheral pulses palpable 2+ bilaterally      CARDIAC MARKERS:  Serum Pro-Brain Natriuretic Peptide: 3116 pg/mL (03.05.22 @ 01:00)      TELEMETRY EVENTS: 	    ECG:  	3/04/22    Ventricular Rate 71 BPM  Atrial Rate 71 BPM  P-R Interval 168 ms  QRS Duration 128 ms  Q-T Interval 450 ms  QTC Calculation(Bazett) 489 ms  P Axis 56 degrees  R Axis -17 degrees  T Axis 12 degrees    Diagnosis Line Sinus rhythm withPremature atrial complexes  Right bundle branch block  Abnormal ECG    Confirmed by AUBREE ÁLVAREZ MD ([id number]) on 3/5/2022 8:37:30 AM        PREVIOUS DIAGNOSTIC TESTING:    TTE 2/17/22    Summary:   1. Normal global left ventricular systolic function.   2. LV Ejection Fraction by Brown's Method with a biplane EF of 74 %.   3. Normal left ventricular internal cavity size.   4. Mild mitral valve regurgitation.   5. Severe aortic valve stenosis.   6. Estimated pulmonary artery systolic pressure is 44.5 mmHg assuming a   right atrial pressure of 15 mmHg, which is consistent with mild pulmonary   hypertension.   7. Pulmonary hypertension is present.   8. Mitral valve mean gradient is 4.6 mmHg consistent with mild mitral   stenosis.   9. Peak transaortic gradient equals 86.1 mmHg, mean transaortic gradient   equals 48.9 mmHg, the calculated aortic valve area equals 0.80 cm² by the   continuity equation consistent with severe aortic stenosis.      Home Medications:  aluminum hydroxide-magnesium hydroxide 200 mg-200 mg/5 mL oral suspension: 30 milliliter(s) orally every 4 hours, As needed, Dyspepsia (24 Jan 2022 12:15)  heparin: 5000 unit(s) subcutaneous 3 times a day (24 Jan 2022 12:15)  melatonin 3 mg oral tablet: 1 tab(s) orally once a day (at bedtime), As needed, Insomnia (24 Jan 2022 12:15)  Metoprolol Tartrate 25 mg oral tablet: 1 tab(s) orally 2 times a day (18 Jan 2022 00:11)  pantoprazole 40 mg oral delayed release tablet: 1 tab(s) orally once a day (before a meal) (24 Jan 2022 12:15)    MEDICATIONS  (STANDING):  chlorhexidine 4% Liquid 1 Application(s) Topical <User Schedule>  furosemide   Injectable 40 milliGRAM(s) IV Push daily  metoprolol tartrate 25 milliGRAM(s) Oral two times a day  pantoprazole    Tablet 40 milliGRAM(s) Oral two times a day  predniSONE   Tablet 50 milliGRAM(s) Oral daily    MEDICATIONS  (PRN):  acetaminophen     Tablet .. 650 milliGRAM(s) Oral every 6 hours PRN Mild Pain (1 - 3)  aluminum hydroxide/magnesium hydroxide/simethicone Suspension 30 milliLiter(s) Oral every 4 hours PRN Dyspepsia  diphenhydrAMINE 25 milliGRAM(s) Oral every 4 hours PRN Rash and/or Itching  ondansetron Injectable 4 milliGRAM(s) IV Push every 8 hours PRN Nausea and/or Vomiting         Date of Admission: 3/4/22    CHIEF COMPLAINT: Patient is a 76y old  Female who presents with a chief complaint of Low Hb (11 Mar 2022 07:12)    HISTORY OF PRESENT ILLNESS:   76 year old (ex smoker) female patient known to have:  - Severe AS. Last TTE 02/17/22 EF 74%, severe AS with A 0.8cm2 and mean P 48.9,  mild MR  - Anemia Likely Secondary to obscure GI bleeding (from Duodenum and Gastric AVM per recent capsule endoscopy 02/2022 after presenting with melena), Fe Deficiency (TFN S% 5 in 2020 and ferritin 28 in 2022), and CKD per Dr Silver. Received Transfusions q1-2 weeks and Retacrit 38528 units weekly  - Moderate Diverticulosis and Grade II Internal Hemorrhoids on colonoscopy 10/22/2019  - Erosive Gastritis on EGD 01/09/2020  - Hypertension  - CKD. Baseline Cr 1.7-2.2 2022  - Sciatica  She presented to the ED on 03/04 for evaluation of low Hb 4.5 on outpatient labs.  History goes back to yesterday when the patient had labs at [facility].  Hb came back as 4.5 today so Dr Silver asked her to come to ED.  Patient reports episodes of substernal chest discomfort and shortness of breath on minimal exertion associated with worsening leg swelling, orthopneas, and PNDs.  * CXR with Bilateral perihilar and lower lobe opacities and effusions, left greater than right.  - GI team evaluated patient in the ED  - OLEKSANDR with brown stool, no bright red blood  - 3 units of packed RBCs were ordered for patient  - Stat IV lasix 40mg x1 dose was administered (not more since BP on low side and patient has severe AS)  - Patient will be admitted for further investigations, management, and monitoring (04 Mar 2022 15:57)    Patient reports being unable to walk short distances or lay flat 2/2 SOB for a few weeks now but ultimately came to the hospital because of low blood levels on an outpatient lab report. Patient endorses compliance with home medications but states she was never prescribed a water pill before. Also states her diet consists of "whatever [facility] gives me". Denies loss of appetite, n/v, abdominal bloating, chest pain or palpitations.     PAST MEDICAL & SURGICAL HISTORY:  HTN (hypertension)  Heart murmur  Eczema  Anemia  Aortic stenosis  H/O appendicitis  H/O cholecystitis        FAMILY HISTORY: No pertinent family history of premature cardiovascular disease in first degree relatives      SOCIAL HISTORY:  Former smoker, denies alcohol or drug use.       Allergies    aspirin (Rash)  latex (Unknown)  penicillins (Unknown)    Intolerances    	    REVIEW OF SYSTEMS:  CONSTITUTIONAL: No fever, weight loss, or fatigue.  CARDIOLOGY: Patient denies chest pain, +shortness of breath, no syncopal episodes.   RESPIRATORY: + shortness of breath, no wheezing.   NEUROLOGICAL: No weakness, no focal deficits to report.  GI: no BRBPR, no n/v, diarrhea.    PSYCHIATRY: normal mood and affect  HEENT: no nasal discharge, no ecchymosis  SKIN: no ecchymosis, no breakdown  MUSCULOSKELETAL: Full range of motion x4.     PHYSICAL EXAM:  General Appearance: well appearing, normal for age and gender. 	  Neck: normal JVP, no bruit.   Eyes: Extra Ocular muscles intact.   Cardiovascular: regular rate and rhythm S1 S2, No JVD, No murmurs, +1 R LE edema (chronic as per pt)  Respiratory: Lungs clear to auscultation	  Psychiatry: Alert and oriented x 3, Mood & affect appropriate  Gastrointestinal:  Soft, Non-tender  Skin/Integumen: No rashes, No ecchymoses, No cyanosis	  Neurologic: Non-focal  Musculoskeletal/ extremities: Normal range of motion, No clubbing, cyanosis, +1 R LE edema (chronic as per pt)  Vascular: Peripheral pulses palpable 2+ bilaterally      CARDIAC MARKERS:  Serum Pro-Brain Natriuretic Peptide: 3116 pg/mL (03.05.22 @ 01:00)      TELEMETRY EVENTS: 	    ECG:  	3/04/22    Ventricular Rate 71 BPM  Atrial Rate 71 BPM  P-R Interval 168 ms  QRS Duration 128 ms  Q-T Interval 450 ms  QTC Calculation(Bazett) 489 ms  P Axis 56 degrees  R Axis -17 degrees  T Axis 12 degrees    Diagnosis Line Sinus rhythm withPremature atrial complexes  Right bundle branch block  Abnormal ECG    Confirmed by AUBREE ÁLVAREZ MD ([id number]) on 3/5/2022 8:37:30 AM        PREVIOUS DIAGNOSTIC TESTING:    TTE 2/17/22    Summary:   1. Normal global left ventricular systolic function.   2. LV Ejection Fraction by Brown's Method with a biplane EF of 74 %.   3. Normal left ventricular internal cavity size.   4. Mild mitral valve regurgitation.   5. Severe aortic valve stenosis.   6. Estimated pulmonary artery systolic pressure is 44.5 mmHg assuming a   right atrial pressure of 15 mmHg, which is consistent with mild pulmonary   hypertension.   7. Pulmonary hypertension is present.   8. Mitral valve mean gradient is 4.6 mmHg consistent with mild mitral   stenosis.   9. Peak transaortic gradient equals 86.1 mmHg, mean transaortic gradient   equals 48.9 mmHg, the calculated aortic valve area equals 0.80 cm² by the   continuity equation consistent with severe aortic stenosis.      Home Medications:  aluminum hydroxide-magnesium hydroxide 200 mg-200 mg/5 mL oral suspension: 30 milliliter(s) orally every 4 hours, As needed, Dyspepsia (24 Jan 2022 12:15)  heparin: 5000 unit(s) subcutaneous 3 times a day (24 Jan 2022 12:15)  melatonin 3 mg oral tablet: 1 tab(s) orally once a day (at bedtime), As needed, Insomnia (24 Jan 2022 12:15)  Metoprolol Tartrate 25 mg oral tablet: 1 tab(s) orally 2 times a day (18 Jan 2022 00:11)  pantoprazole 40 mg oral delayed release tablet: 1 tab(s) orally once a day (before a meal) (24 Jan 2022 12:15)    MEDICATIONS  (STANDING):  chlorhexidine 4% Liquid 1 Application(s) Topical <User Schedule>  furosemide   Injectable 40 milliGRAM(s) IV Push daily  metoprolol tartrate 25 milliGRAM(s) Oral two times a day  pantoprazole    Tablet 40 milliGRAM(s) Oral two times a day  predniSONE   Tablet 50 milliGRAM(s) Oral daily    MEDICATIONS  (PRN):  acetaminophen     Tablet .. 650 milliGRAM(s) Oral every 6 hours PRN Mild Pain (1 - 3)  aluminum hydroxide/magnesium hydroxide/simethicone Suspension 30 milliLiter(s) Oral every 4 hours PRN Dyspepsia  diphenhydrAMINE 25 milliGRAM(s) Oral every 4 hours PRN Rash and/or Itching  ondansetron Injectable 4 milliGRAM(s) IV Push every 8 hours PRN Nausea and/or Vomiting

## 2022-03-11 NOTE — PROGRESS NOTE ADULT - ASSESSMENT
76y female w/ a PMHx of CKD IV (baseline Cr 1.7-2.2 from 2022), HFpEF EF 74%, Severe AS, Chronic anemia likely due to GI bleed per capsule endoscopy (2/2022) showing duodenum and gastric AVM (outpatient pt receives Procrit 20,000 units every other week, and transfusions every week), Fe deficiency, Grade II Diverticulosis, Internal hemorrhoids, erosive gastritis, HTN presenting from NH for low hemoglobin (4.5) per o/p labs as instructed by Dr. Silver. Pt c/o substernal chest discomfort, SOB, worsening leg swelling, and orthopnea. Pt also admits to passing dark stools almost daily.    Laura on CKD 4 - due to CRS vs AIN - new skin rash on torso and arm   started on prednisone   Please place a Primafit for strict Is and OS  UA neg blood/prot  Kidney sono no hydro  Creat stable after reducing the dose of LAsix  AS - will need a CTA, though deffered for now  .

## 2022-03-11 NOTE — CONSULT NOTE ADULT - ASSESSMENT
HFpEF / Severe AS / Anemia / EVAN on CKD / HTN    Patient appears close to euvolemic on exam  Stop Furosemide IVP  Tomorrow 3/12 start Torsemide 20 mg daily, take an extra tablet if a weight gain of 2 lbs or more in one day  Get BMP daily   Maintain potassium >4.0, Mg >2.1  Strict intake and output  Daily weight   Plan discussed with primary team   HFpEF / Severe AS / Anemia / EVAN on CKD / HTN    Patient appears close to euvolemic on exam  Stop Furosemide IVP  Tomorrow 3/12 start Torsemide 20 mg daily, take an extra tablet if a weight gain of 2 lbs or more in one day  Get BMP daily   Maintain potassium >4.0, Mg >2.1  Strict intake and output  Daily weight   Replete with iv iron   Rest of care per primary team   Follow up as outpatient for further management     Plan discussed with primary team

## 2022-03-11 NOTE — DISCHARGE NOTE PROVIDER - NSDCMRMEDTOKEN_GEN_ALL_CORE_FT
aluminum hydroxide-magnesium hydroxide 200 mg-200 mg/5 mL oral suspension: 30 milliliter(s) orally every 4 hours, As needed, Dyspepsia  heparin: 5000 unit(s) subcutaneous 3 times a day  Lokelma 10 g oral powder for reconstitution: 10 gram(s) orally once a day   melatonin 3 mg oral tablet: 1 tab(s) orally once a day (at bedtime), As needed, Insomnia  Metoprolol Tartrate 25 mg oral tablet: 1 tab(s) orally 2 times a day  pantoprazole 40 mg oral delayed release tablet: 1 tab(s) orally once a day (before a meal)   aluminum hydroxide-magnesium hydroxide 200 mg-200 mg/5 mL oral suspension: 30 milliliter(s) orally every 4 hours, As needed, Dyspepsia  diphenhydrAMINE 25 mg oral capsule: 1 cap(s) orally every 4 hours, As needed, Rash and/or Itching  melatonin 3 mg oral tablet: 1 tab(s) orally once a day (at bedtime), As needed, Insomnia  Metoprolol Tartrate 25 mg oral tablet: 1 tab(s) orally 2 times a day  pantoprazole 40 mg oral delayed release tablet: 1 tab(s) orally 2 times a day  predniSONE 50 mg oral tablet: 1 tab(s) orally once a day  torsemide 20 mg oral tablet: 1 tab(s) orally once a day   aluminum hydroxide-magnesium hydroxide 200 mg-200 mg/5 mL oral suspension: 30 milliliter(s) orally every 4 hours, As needed, Dyspepsia  diphenhydrAMINE 25 mg oral capsule: 1 cap(s) orally every 4 hours, As needed, Rash and/or Itching  melatonin 3 mg oral tablet: 1 tab(s) orally once a day (at bedtime), As needed, Insomnia  Metoprolol Tartrate 25 mg oral tablet: 1 tab(s) orally 2 times a day  pantoprazole 40 mg oral delayed release tablet: 1 tab(s) orally 2 times a day  predniSONE 50 mg oral tablet: 1 tab(s) orally once a day x 6 adys  torsemide 20 mg oral tablet: 1 tab(s) orally once a day

## 2022-03-11 NOTE — DISCHARGE NOTE PROVIDER - CARE PROVIDERS DIRECT ADDRESSES
[email or web address]

## 2022-03-11 NOTE — DISCHARGE NOTE PROVIDER - CARE PROVIDER_API CALL
Reji Hernandez)  Cardiovascular Disease; Internal Medicine  [address] [place] [address]  Phone: [phone]  Fax: [phone]  Follow Up Time: 1 week   Reji Hernandez)  Cardiovascular Disease; Internal Medicine  [address] [facility]  [place] [address]  Phone: [phone]  Fax: [phone]  Follow Up Time: 1 week    Uzma Saldivar; MD)  Adv Heart Fail Trnsplnt Cardio; Cardiovascular Disease; Internal Medicine  [address] [place] [address], [address] [place] [address]  Phone: [phone]  Fax: [phone]  Follow Up Time: 2 weeks    Liu Gaston)  Internal Medicine; Medical Oncology  [address] [place] [address]  Phone: [phone]  Fax: [phone]  Follow Up Time: 2 weeks    Alon Cartagena)  Gastroenterology; Internal Medicine  [address] [place] [address]  Phone: [phone]  Fax: [phone]  Follow Up Time: 2 weeks

## 2022-03-11 NOTE — PROGRESS NOTE ADULT - SUBJECTIVE AND OBJECTIVE BOX
Patient is a 76y old  Female who presents with a chief complaint of Low Hb (10 Mar 2022 10:10)    INTERVAL HPI/OVERNIGHT EVENTS:   No overnight events   Afebrile, hemodynamically stable     Vital Signs Last 24 Hrs  T(C): 36.1 (11 Mar 2022 00:00), Max: 36.3 (10 Mar 2022 17:18)  T(F): 96.9 (11 Mar 2022 00:00), Max: 97.3 (10 Mar 2022 17:18)  HR: 70 (11 Mar 2022 06:54) (63 - 97)  BP: 112/51 (11 Mar 2022 06:54) (112/51 - 136/56)  BP(mean): --  ABP: --  ABP(mean): --  RR: 18 (11 Mar 2022 00:00) (18 - 18)  SpO2: 97% (11 Mar 2022 00:00) (97% - 97%)    I&O's Summary        LABS:                        8.4    5.93  )-----------( 140      ( 10 Mar 2022 11:00 )             28.5     03-10    146  |  109  |  68<HH>  ----------------------------<  81  4.9   |  26  |  3.1<H>    Ca    8.5      10 Mar 2022 04:30  Mg     2.0     03-10    TPro  5.6<L>  /  Alb  3.3<L>  /  TBili  0.8  /  DBili  x   /  AST  14  /  ALT  7   /  AlkPhos  64  03-10      Urinalysis Basic - ( 10 Mar 2022 18:13 )    Color: Light Yellow / Appearance: Clear / S.013 / pH: x  Gluc: x / Ketone: Negative  / Bili: Negative / Urobili: <2 mg/dL   Blood: x / Protein: Negative / Nitrite: Negative   Leuk Esterase: Negative / RBC: x / WBC x   Sq Epi: x / Non Sq Epi: x / Bacteria: x      CAPILLARY BLOOD GLUCOSE      POCT Blood Glucose.: 90 mg/dL (10 Mar 2022 07:48)        RADIOLOGY & ADDITIONAL TESTS:    Consultant(s) Notes Reviewed:  [x ] YES  [ ] NO    MEDICATIONS  (STANDING):  chlorhexidine 4% Liquid 1 Application(s) Topical <User Schedule>  furosemide   Injectable 40 milliGRAM(s) IV Push daily  metoprolol tartrate 25 milliGRAM(s) Oral two times a day  pantoprazole    Tablet 40 milliGRAM(s) Oral two times a day  predniSONE   Tablet 50 milliGRAM(s) Oral daily    MEDICATIONS  (PRN):  acetaminophen     Tablet .. 650 milliGRAM(s) Oral every 6 hours PRN Mild Pain (1 - 3)  aluminum hydroxide/magnesium hydroxide/simethicone Suspension 30 milliLiter(s) Oral every 4 hours PRN Dyspepsia  diphenhydrAMINE 25 milliGRAM(s) Oral every 4 hours PRN Rash and/or Itching  ondansetron Injectable 4 milliGRAM(s) IV Push every 8 hours PRN Nausea and/or Vomiting      PHYSICAL EXAM:  GENERAL: in NAD  HEENT: atraumatic  Cardiac: 4/6 systolic murmur  Respiratory: bilateral decreased basilar breath sounds  Abdomen: no tenderness to palpation. bowel sounds present  Extremities: bilateral LE edema  Skin: no rashes or lesions   Patient is a 76y old  Female who presents with a chief complaint of Low Hb (10 Mar 2022 10:10)    INTERVAL HPI/OVERNIGHT EVENTS:   No overnight events   Afebrile, hemodynamically stable   Reports that she would like to be discharged back to nursing home because her  is there and needs care.     Vital Signs Last 24 Hrs  T(C): 36.1 (11 Mar 2022 00:00), Max: 36.3 (10 Mar 2022 17:18)  T(F): 96.9 (11 Mar 2022 00:00), Max: 97.3 (10 Mar 2022 17:18)  HR: 70 (11 Mar 2022 06:54) (63 - 97)  BP: 112/51 (11 Mar 2022 06:54) (112/51 - 136/56)  BP(mean): --  ABP: --  ABP(mean): --  RR: 18 (11 Mar 2022 00:00) (18 - 18)  SpO2: 97% (11 Mar 2022 00:00) (97% - 97%)    I&O's Summary        LABS:                        8.4    5.93  )-----------( 140      ( 10 Mar 2022 11:00 )             28.5     03-10    146  |  109  |  68<HH>  ----------------------------<  81  4.9   |  26  |  3.1<H>    Ca    8.5      10 Mar 2022 04:30  Mg     2.0     03-10    TPro  5.6<L>  /  Alb  3.3<L>  /  TBili  0.8  /  DBili  x   /  AST  14  /  ALT  7   /  AlkPhos  64  03-10      Urinalysis Basic - ( 10 Mar 2022 18:13 )    Color: Light Yellow / Appearance: Clear / S.013 / pH: x  Gluc: x / Ketone: Negative  / Bili: Negative / Urobili: <2 mg/dL   Blood: x / Protein: Negative / Nitrite: Negative   Leuk Esterase: Negative / RBC: x / WBC x   Sq Epi: x / Non Sq Epi: x / Bacteria: x      CAPILLARY BLOOD GLUCOSE      POCT Blood Glucose.: 90 mg/dL (10 Mar 2022 07:48)        RADIOLOGY & ADDITIONAL TESTS:    Consultant(s) Notes Reviewed:  [x ] YES  [ ] NO    MEDICATIONS  (STANDING):  chlorhexidine 4% Liquid 1 Application(s) Topical <User Schedule>  furosemide   Injectable 40 milliGRAM(s) IV Push daily  metoprolol tartrate 25 milliGRAM(s) Oral two times a day  pantoprazole    Tablet 40 milliGRAM(s) Oral two times a day  predniSONE   Tablet 50 milliGRAM(s) Oral daily    MEDICATIONS  (PRN):  acetaminophen     Tablet .. 650 milliGRAM(s) Oral every 6 hours PRN Mild Pain (1 - 3)  aluminum hydroxide/magnesium hydroxide/simethicone Suspension 30 milliLiter(s) Oral every 4 hours PRN Dyspepsia  diphenhydrAMINE 25 milliGRAM(s) Oral every 4 hours PRN Rash and/or Itching  ondansetron Injectable 4 milliGRAM(s) IV Push every 8 hours PRN Nausea and/or Vomiting      PHYSICAL EXAM:  GENERAL: in NAD  HEENT: atraumatic  Cardiac: 4/6 systolic murmur  Respiratory: bilateral decreased basilar breath sounds  Abdomen: no tenderness to palpation. bowel sounds present  Extremities: bilateral mild LE nonpitting edema  Skin: no rashes or lesions   Patient is a 76y old  Female who presents with a chief complaint of Low Hb (10 Mar 2022 10:10)    INTERVAL HPI/OVERNIGHT EVENTS:   No overnight events   Afebrile, hemodynamically stable   Reports that she would like to be discharged back to nursing home because her  is there and needs care.     Vital Signs Last 24 Hrs  T(C): 36.1 (11 Mar 2022 00:00), Max: 36.3 (10 Mar 2022 17:18)  T(F): 96.9 (11 Mar 2022 00:00), Max: 97.3 (10 Mar 2022 17:18)  HR: 70 (11 Mar 2022 06:54) (63 - 97)  BP: 112/51 (11 Mar 2022 06:54) (112/51 - 136/56)  BP(mean): --  ABP: --  ABP(mean): --  RR: 18 (11 Mar 2022 00:00) (18 - 18)  SpO2: 97% (11 Mar 2022 00:00) (97% - 97%)    I&O's Summary        LABS:                        8.4    5.93  )-----------( 140      ( 10 Mar 2022 11:00 )             28.5     03-10    146  |  109  |  68<HH>  ----------------------------<  81  4.9   |  26  |  3.1<H>    Ca    8.5      10 Mar 2022 04:30  Mg     2.0     03-10    TPro  5.6<L>  /  Alb  3.3<L>  /  TBili  0.8  /  DBili  x   /  AST  14  /  ALT  7   /  AlkPhos  64  03-10      Urinalysis Basic - ( 10 Mar 2022 18:13 )    Color: Light Yellow / Appearance: Clear / S.013 / pH: x  Gluc: x / Ketone: Negative  / Bili: Negative / Urobili: <2 mg/dL   Blood: x / Protein: Negative / Nitrite: Negative   Leuk Esterase: Negative / RBC: x / WBC x   Sq Epi: x / Non Sq Epi: x / Bacteria: x      CAPILLARY BLOOD GLUCOSE      POCT Blood Glucose.: 90 mg/dL (10 Mar 2022 07:48)        RADIOLOGY & ADDITIONAL TESTS:    Consultant(s) Notes Reviewed:  [x ] YES  [ ] NO    MEDICATIONS  (STANDING):  chlorhexidine 4% Liquid 1 Application(s) Topical <User Schedule>  furosemide   Injectable 40 milliGRAM(s) IV Push daily  metoprolol tartrate 25 milliGRAM(s) Oral two times a day  pantoprazole    Tablet 40 milliGRAM(s) Oral two times a day  predniSONE   Tablet 50 milliGRAM(s) Oral daily    MEDICATIONS  (PRN):  acetaminophen     Tablet .. 650 milliGRAM(s) Oral every 6 hours PRN Mild Pain (1 - 3)  aluminum hydroxide/magnesium hydroxide/simethicone Suspension 30 milliLiter(s) Oral every 4 hours PRN Dyspepsia  diphenhydrAMINE 25 milliGRAM(s) Oral every 4 hours PRN Rash and/or Itching  ondansetron Injectable 4 milliGRAM(s) IV Push every 8 hours PRN Nausea and/or Vomiting      PHYSICAL EXAM:  GENERAL: in NAD  HEENT: atraumatic  Cardiac: 4/6 systolic murmur  Respiratory: decreased bilateral breath sounds  Abdomen: no tenderness to palpation. bowel sounds present  Extremities: bilateral 1+ pitting edema  Skin: no rashes or lesions   Patient is a 76y old  Female who presents with a chief complaint of Low Hb (10 Mar 2022 10:10)    INTERVAL HPI/OVERNIGHT EVENTS:   No overnight events   Afebrile, hemodynamically stable   Reports that she would like to be discharged back to nursing home because her  is there and needs care.     Vital Signs Last 24 Hrs  T(C): 36.1 (11 Mar 2022 00:00), Max: 36.3 (10 Mar 2022 17:18)  T(F): 96.9 (11 Mar 2022 00:00), Max: 97.3 (10 Mar 2022 17:18)  HR: 70 (11 Mar 2022 06:54) (63 - 97)  BP: 112/51 (11 Mar 2022 06:54) (112/51 - 136/56)  BP(mean): --  ABP: --  ABP(mean): --  RR: 18 (11 Mar 2022 00:00) (18 - 18)  SpO2: 97% (11 Mar 2022 00:00) (97% - 97%)    I&O's Summary        LABS:                        8.4    5.93  )-----------( 140      ( 10 Mar 2022 11:00 )             28.5     03-10    146  |  109  |  68<HH>  ----------------------------<  81  4.9   |  26  |  3.1<H>    Ca    8.5      10 Mar 2022 04:30  Mg     2.0     03-10    TPro  5.6<L>  /  Alb  3.3<L>  /  TBili  0.8  /  DBili  x   /  AST  14  /  ALT  7   /  AlkPhos  64  03-10      Urinalysis Basic - ( 10 Mar 2022 18:13 )    Color: Light Yellow / Appearance: Clear / S.013 / pH: x  Gluc: x / Ketone: Negative  / Bili: Negative / Urobili: <2 mg/dL   Blood: x / Protein: Negative / Nitrite: Negative   Leuk Esterase: Negative / RBC: x / WBC x   Sq Epi: x / Non Sq Epi: x / Bacteria: x      CAPILLARY BLOOD GLUCOSE      POCT Blood Glucose.: 90 mg/dL (10 Mar 2022 07:48)        RADIOLOGY & ADDITIONAL TESTS:    Consultant(s) Notes Reviewed:  [x ] YES  [ ] NO    MEDICATIONS  (STANDING):  chlorhexidine 4% Liquid 1 Application(s) Topical <User Schedule>  furosemide   Injectable 40 milliGRAM(s) IV Push daily  metoprolol tartrate 25 milliGRAM(s) Oral two times a day  pantoprazole    Tablet 40 milliGRAM(s) Oral two times a day  predniSONE   Tablet 50 milliGRAM(s) Oral daily    MEDICATIONS  (PRN):  acetaminophen     Tablet .. 650 milliGRAM(s) Oral every 6 hours PRN Mild Pain (1 - 3)  aluminum hydroxide/magnesium hydroxide/simethicone Suspension 30 milliLiter(s) Oral every 4 hours PRN Dyspepsia  diphenhydrAMINE 25 milliGRAM(s) Oral every 4 hours PRN Rash and/or Itching  ondansetron Injectable 4 milliGRAM(s) IV Push every 8 hours PRN Nausea and/or Vomiting      PHYSICAL EXAM:  GENERAL: in NAD  HEENT: atraumatic  Cardiac: 3/6 systolic murmur  Respiratory: decreased bilateral breath sounds  Abdomen: no tenderness to palpation. bowel sounds present  Extremities: bilateral 1+ pitting edema (R>L)  Skin: no rashes or lesions   Patient is a 76y old  Female who presents with a chief complaint of Low Hb (10 Mar 2022 10:10)    INTERVAL HPI/OVERNIGHT EVENTS:   No overnight events   Afebrile, hemodynamically stable   Reports that she would like to be discharged back to nursing home because her  is there and needs care.     Vital Signs Last 24 Hrs  T(C): 36.1 (11 Mar 2022 00:00), Max: 36.3 (10 Mar 2022 17:18)  T(F): 96.9 (11 Mar 2022 00:00), Max: 97.3 (10 Mar 2022 17:18)  HR: 70 (11 Mar 2022 06:54) (63 - 97)  BP: 112/51 (11 Mar 2022 06:54) (112/51 - 136/56)  BP(mean): --  ABP: --  ABP(mean): --  RR: 18 (11 Mar 2022 00:00) (18 - 18)  SpO2: 97% (11 Mar 2022 00:00) (97% - 97%)    I&O's Summary        LABS:                        8.4    5.93  )-----------( 140      ( 10 Mar 2022 11:00 )             28.5     03-10    146  |  109  |  68<HH>  ----------------------------<  81  4.9   |  26  |  3.1<H>    Ca    8.5      10 Mar 2022 04:30  Mg     2.0     03-10    TPro  5.6<L>  /  Alb  3.3<L>  /  TBili  0.8  /  DBili  x   /  AST  14  /  ALT  7   /  AlkPhos  64  03-10      Urinalysis Basic - ( 10 Mar 2022 18:13 )    Color: Light Yellow / Appearance: Clear / S.013 / pH: x  Gluc: x / Ketone: Negative  / Bili: Negative / Urobili: <2 mg/dL   Blood: x / Protein: Negative / Nitrite: Negative   Leuk Esterase: Negative / RBC: x / WBC x   Sq Epi: x / Non Sq Epi: x / Bacteria: x      CAPILLARY BLOOD GLUCOSE      POCT Blood Glucose.: 90 mg/dL (10 Mar 2022 07:48)        RADIOLOGY & ADDITIONAL TESTS:    Consultant(s) Notes Reviewed:  [x ] YES  [ ] NO    MEDICATIONS  (STANDING):  chlorhexidine 4% Liquid 1 Application(s) Topical <User Schedule>  furosemide   Injectable 40 milliGRAM(s) IV Push daily  metoprolol tartrate 25 milliGRAM(s) Oral two times a day  pantoprazole    Tablet 40 milliGRAM(s) Oral two times a day  predniSONE   Tablet 50 milliGRAM(s) Oral daily    MEDICATIONS  (PRN):  acetaminophen     Tablet .. 650 milliGRAM(s) Oral every 6 hours PRN Mild Pain (1 - 3)  aluminum hydroxide/magnesium hydroxide/simethicone Suspension 30 milliLiter(s) Oral every 4 hours PRN Dyspepsia  diphenhydrAMINE 25 milliGRAM(s) Oral every 4 hours PRN Rash and/or Itching  ondansetron Injectable 4 milliGRAM(s) IV Push every 8 hours PRN Nausea and/or Vomiting      PHYSICAL EXAM:  GENERAL: in NAD  HEENT: atraumatic  Cardiac: 3/6 systolic murmur  Respiratory: decreased bilateral breath sounds (L>R)  Abdomen: no tenderness to palpation. bowel sounds present  Extremities: bilateral 1+ pitting edema (R>L)  Skin: no rashes or lesions

## 2022-03-12 LAB
ALBUMIN SERPL ELPH-MCNC: 3.3 G/DL — LOW (ref 3.5–5.2)
ALP SERPL-CCNC: 55 U/L — SIGNIFICANT CHANGE UP (ref 30–115)
ALT FLD-CCNC: 7 U/L — SIGNIFICANT CHANGE UP (ref 0–41)
ANION GAP SERPL CALC-SCNC: 11 MMOL/L — SIGNIFICANT CHANGE UP (ref 7–14)
AST SERPL-CCNC: 11 U/L — SIGNIFICANT CHANGE UP (ref 0–41)
BASOPHILS # BLD AUTO: 0.02 K/UL — SIGNIFICANT CHANGE UP (ref 0–0.2)
BASOPHILS NFR BLD AUTO: 0.3 % — SIGNIFICANT CHANGE UP (ref 0–1)
BILIRUB SERPL-MCNC: 0.7 MG/DL — SIGNIFICANT CHANGE UP (ref 0.2–1.2)
BUN SERPL-MCNC: 69 MG/DL — CRITICAL HIGH (ref 10–20)
CALCIUM SERPL-MCNC: 8.7 MG/DL — SIGNIFICANT CHANGE UP (ref 8.5–10.1)
CHLORIDE SERPL-SCNC: 108 MMOL/L — SIGNIFICANT CHANGE UP (ref 98–110)
CO2 SERPL-SCNC: 27 MMOL/L — SIGNIFICANT CHANGE UP (ref 17–32)
CREAT ?TM UR-MCNC: 128 MG/DL — SIGNIFICANT CHANGE UP
CREAT SERPL-MCNC: 3 MG/DL — HIGH (ref 0.7–1.5)
EGFR: 16 ML/MIN/1.73M2 — LOW
EOSINOPHIL # BLD AUTO: 0.53 K/UL — SIGNIFICANT CHANGE UP (ref 0–0.7)
EOSINOPHIL NFR BLD AUTO: 8.1 % — HIGH (ref 0–8)
EOSINOPHIL NFR URNS MANUAL: NEGATIVE — SIGNIFICANT CHANGE UP
GLUCOSE SERPL-MCNC: 90 MG/DL — SIGNIFICANT CHANGE UP (ref 70–99)
HCT VFR BLD CALC: 26.4 % — LOW (ref 37–47)
HGB BLD-MCNC: 8.1 G/DL — LOW (ref 12–16)
IMM GRANULOCYTES NFR BLD AUTO: 0.6 % — HIGH (ref 0.1–0.3)
LYMPHOCYTES # BLD AUTO: 0.92 K/UL — LOW (ref 1.2–3.4)
LYMPHOCYTES # BLD AUTO: 14.1 % — LOW (ref 20.5–51.1)
MAGNESIUM SERPL-MCNC: 2.2 MG/DL — SIGNIFICANT CHANGE UP (ref 1.8–2.4)
MCHC RBC-ENTMCNC: 28.9 PG — SIGNIFICANT CHANGE UP (ref 27–31)
MCHC RBC-ENTMCNC: 30.7 G/DL — LOW (ref 32–37)
MCV RBC AUTO: 94.3 FL — SIGNIFICANT CHANGE UP (ref 81–99)
MONOCYTES # BLD AUTO: 0.61 K/UL — HIGH (ref 0.1–0.6)
MONOCYTES NFR BLD AUTO: 9.3 % — SIGNIFICANT CHANGE UP (ref 1.7–9.3)
NEUTROPHILS # BLD AUTO: 4.41 K/UL — SIGNIFICANT CHANGE UP (ref 1.4–6.5)
NEUTROPHILS NFR BLD AUTO: 67.6 % — SIGNIFICANT CHANGE UP (ref 42.2–75.2)
NRBC # BLD: 0 /100 WBCS — SIGNIFICANT CHANGE UP (ref 0–0)
PHOSPHATE SERPL-MCNC: 3.9 MG/DL — SIGNIFICANT CHANGE UP (ref 2.1–4.9)
PLATELET # BLD AUTO: 137 K/UL — SIGNIFICANT CHANGE UP (ref 130–400)
POTASSIUM SERPL-MCNC: 4.8 MMOL/L — SIGNIFICANT CHANGE UP (ref 3.5–5)
POTASSIUM SERPL-SCNC: 4.8 MMOL/L — SIGNIFICANT CHANGE UP (ref 3.5–5)
PROT SERPL-MCNC: 5.8 G/DL — LOW (ref 6–8)
RBC # BLD: 2.8 M/UL — LOW (ref 4.2–5.4)
RBC # FLD: 19.8 % — HIGH (ref 11.5–14.5)
SODIUM SERPL-SCNC: 146 MMOL/L — SIGNIFICANT CHANGE UP (ref 135–146)
SODIUM UR-SCNC: 30 MMOL/L — SIGNIFICANT CHANGE UP
WBC # BLD: 6.53 K/UL — SIGNIFICANT CHANGE UP (ref 4.8–10.8)
WBC # FLD AUTO: 6.53 K/UL — SIGNIFICANT CHANGE UP (ref 4.8–10.8)

## 2022-03-12 PROCEDURE — 99233 SBSQ HOSP IP/OBS HIGH 50: CPT

## 2022-03-12 RX ADMIN — Medication 50 MILLIGRAM(S): at 05:49

## 2022-03-12 RX ADMIN — PANTOPRAZOLE SODIUM 40 MILLIGRAM(S): 20 TABLET, DELAYED RELEASE ORAL at 05:48

## 2022-03-12 RX ADMIN — CHLORHEXIDINE GLUCONATE 1 APPLICATION(S): 213 SOLUTION TOPICAL at 06:41

## 2022-03-12 RX ADMIN — Medication 25 MILLIGRAM(S): at 05:49

## 2022-03-12 RX ADMIN — PANTOPRAZOLE SODIUM 40 MILLIGRAM(S): 20 TABLET, DELAYED RELEASE ORAL at 17:27

## 2022-03-12 RX ADMIN — Medication 25 MILLIGRAM(S): at 17:26

## 2022-03-12 RX ADMIN — Medication 25 MILLIGRAM(S): at 21:13

## 2022-03-12 RX ADMIN — Medication 20 MILLIGRAM(S): at 05:49

## 2022-03-12 NOTE — PROGRESS NOTE ADULT - ASSESSMENT
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 Bleeding in setting of CKD and DARRIN  #Moderate Diverticulosis   #Grade II Internal Hemorrhoids  #Erosive Gastritis  * Anemia Likely Secondary to obscure GI bleeding (from Duodenum and Gastric AVM per recent capsule endoscopy 02/2022 after presenting with melena), Fe Deficiency (TFN S% 5 in 2020 and ferritin 28 in 2022), and CKD per Dr Silver  * Receives Transfusions q1-2 weeks and Retacrit 61248 units weekly  * Recent Admission in 02/2022 for melena: s/p capsule endoscopy showing bleeding from Duodenum and Gastric AVM   * Moderate Diverticulosis and Grade II Internal Hemorrhoids on colonoscopy 10/22/2019  * Erosive Gastritis on EGD 01/09/2020  * Hemodynamically stable in ED, OLEKSANDR with brown stool, Hb 4.5 s/p 3 units pRBC and lasix IV     - GI team consulted: per previous admission and Dr Paula's risk stratification, patient at high risk for endoscopic interventions in setting of severe AS. pt would only want endoscopic intervention for overt evidence of bleeding  - s/p 4 u pRBC in total  - keep Hgb >8, active T/S  - Trend CBC closely and transfuse as needed (avoid volume overload with diuresis but keep an eye on BP in setting of severe AS)  - c/w PPI IV q12-->PPI PO 12 3/10  - monitor BM  - LDH, retic count elevated. haptoglobin wnl  - retacrit 20,000unit x 1 dose 3/7 and venofer 200mg x 3 doses starting 3/7 as per heme/onc recs  - f/u wvf antigen 198, vwf activity 188, and vf multimers, r/o Heyde syndrome  - as per discussion with GI fellow 3/9, pt does not have any signs of active bleeding, no acute interventions  - pt at high risk for rebleed, nephro recommending consulting advanced GI. will need outpatient follow up with GI and heme/onc    #Acute HFpEF  #severe AS  * Last TTE 02/17/22 EF 74%, severe AS with A 0.8cm2 and mean P 48.9,  mild MR  Acute on Chronic HFmEF Exacerbation  * Cardiologist Dr Paula  * PE leg swelling and diffuse crackles; CXR with Bilateral perihilar and lower lobe opacities and effusions, left greater than right.  * S/P IV Lasix 40mg x1 dose in ED  * BNP 3116    - Cardiology Dr Paula consulted for discussion of TAVR/ SAVR in setting of severe AS and high risk for endoscopic procedures (patient needs a solution for GI bleeding which would only be achieved with an endoscopic intervention)  - Monitor in/out  - Daily weight (standing)  - Monitor SaO2 and O2 requirements  - Salt restricted diet and Fluid restriction to 1.2L per 24 hours  - cardio consult: cath and possible TAVR potentially in future once active GI bleed, anemia, and EVAN resolved  - lasix 40mg IV q12 -> changed to IV lasix 40mg 3/8  - discussed with cardiology fellow 3/9, pt will require medical optimization before cardio can work her up for potential cath/TAVR in the future  - primafit placed for accurate in's/outs, maintain negative balance  - c/w metoprolol 25 BID  - HF recs appreciated.  - lasix 40mg daily dc'ed. starting torsemide 20mg daily and an extra tablet for >2lb weight gain/day  - f/u outpatient    #EVAN on CKD4 vs progressive CKD- likely CRS- improving  CKD Progression Versus Cardiorenal (volume overload) or Prerenal EVAN (slow GI bleeding)  * Baseline Cr 1.7-2.2 2022  * Recent admission with Cr 2.7-2.9 throughout  * ED BUN 60, Cr 2.7    - U/S: no hydronephrosis  - c/w diuresis for volume overload and monitor  - c/w Diuresis as above. spoke with RN staff at [facility]. pt was not on any diuretics there  - monitor BMP  - renal/bladder U/S- no stones or hydro  - renal consult: CRS, r/o AIN  - U/A negative  - f/u urine eosinophils    #Hypertension  * Home lopressor 25mg BID  * ED /44mmHg  - Monitor BP and avoid hypotension in setting of severe AS  - Resume home anti HTN with holding parameters  - Diuretics as above    #Thrombocytopenia- improving    #Sciatica  - PT once more euvolemic  - Pain control    #Rash-possibly allergic- improving  - s/p prednisone 20mg x2 doses 3/6 and 3/8  - pt reports that she always gets premedicated before getting blood transfusions  - in setting of ruling out AIN, will give prednisone 50mg x 5 days (started 3/10)    #Misc:  - DVT Prophylaxis: SCDs  - GI Prophylaxis: protonix IV BID  - Diet: soft and bite sized, 1.2L fluid restriction  - Code Status: Full  - Dispo: anticipate dc on Monday [facility]  - Pending: vwf studies, Cr, nephro f/u, urine eosinophils     76 year old female patient with multiple comorbidities including severe AS, HTN, CKD, sciatica, and anemia recently found to have evidence of 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 Bleeding in setting of CKD and DARRIN  #Moderate Diverticulosis   #Grade II Internal Hemorrhoids  #Erosive Gastritis  * Anemia Likely Secondary to obscure GI bleeding (from Duodenum and Gastric AVM per recent capsule endoscopy 02/2022 after presenting with melena), Fe Deficiency (TFN S% 5 in 2020 and ferritin 28 in 2022), and CKD per Dr Silver  * Receives Transfusions q1-2 weeks and Retacrit 69863 units weekly  * Recent Admission in 02/2022 for melena: s/p capsule endoscopy showing bleeding from Duodenum and Gastric AVM   * Moderate Diverticulosis and Grade II Internal Hemorrhoids on colonoscopy 10/22/2019  * Erosive Gastritis on EGD 01/09/2020  * Hemodynamically stable in ED, OLEKSANDR with brown stool, Hb 4.5 s/p 3 units pRBC and lasix IV     - GI team consulted: per previous admission and Dr Paula's risk stratification, patient at high risk for endoscopic interventions in setting of severe AS. pt would only want endoscopic intervention for overt evidence of bleeding  - s/p 4 u pRBC in total  - keep Hgb >8, active T/S  - Trend CBC closely and transfuse as needed (avoid volume overload with diuresis but keep an eye on BP in setting of severe AS)  - c/w PPI IV q12-->PPI PO 12 3/10  - monitor BM  - LDH, retic count elevated. haptoglobin wnl  - retacrit 20,000unit x 1 dose 3/7 and venofer 200mg x 3 doses starting 3/7 as per heme/onc recs  - f/u wvf antigen 198, vwf activity 188, and vf multimers, r/o Heyde syndrome  - as per discussion with GI fellow 3/9, pt does not have any signs of active bleeding, no acute interventions  - pt at high risk for rebleed, nephro recommending consulting advanced GI. will need outpatient follow up with GI and heme/onc    #Acute HFpEF  #severe AS  * Last TTE 02/17/22 EF 74%, severe AS with A 0.8cm2 and mean P 48.9,  mild MR  Acute on Chronic HFmEF Exacerbation  * Cardiologist Dr Paula  * S/P IV Lasix 40mg x1 dose in ED  * BNP 3116    - Cardiology Dr Paula consulted for discussion of TAVR/ SAVR in setting of severe AS and high risk for endoscopic procedures (patient needs a solution for GI bleeding which would only be achieved with an endoscopic intervention)  - Monitor in/out  - Daily weight (standing)  - Monitor SaO2 and O2 requirements  - Salt restricted diet and Fluid restriction to 1.2L per 24 hours  - cardio consult: cath and possible TAVR potentially in future once active GI bleed, anemia, and EVAN resolved  - discussed with cardiology fellow 3/9, pt will require medical optimization before cardio can work her up for potential cath/TAVR in the future  - accurate in's/outs, maintain negative balance  - HF recs appreciated  - c/w metoprolol 25 BID  - lasix 40mg IV q12 -> changed to IV lasix 40mg 3/8 (dc'ed 3/11) --> torsmide 20mg qdaily and extra tablet for >2lb weight gain/day (starting 3/12)  - f/u HF outpatient    #EVAN on CKD4 vs progressive CKD- likely CRS- stable  CKD Progression Versus Cardiorenal (volume overload) or Prerenal EVAN (slow GI bleeding)  * Baseline Cr 1.7-2.2 2022  * Recent admission with Cr 2.7-2.9 throughout  * ED BUN 60, Cr 2.7    - U/S: no hydronephrosis  - c/w diuresis for volume overload and monitor  - monitor BMP  - renal/bladder U/S- no stones or hydro  - nephro following: CRS vs AIN  - U/A negative  - PO steroids x 5 days for rash/itching  - f/u urine eosinophils, phosphorus levels    #Hypertension  * Home lopressor 25mg BID  * ED /44mmHg  - Monitor BP and avoid hypotension in setting of severe AS  - Resume home anti HTN with holding parameters  - Diuretics as above    #Thrombocytopenia- improving    #Sciatica  - PT once more euvolemic  - Pain control    #Rash-possibly allergic- improving  - s/p prednisone 20mg x2 doses 3/6 and 3/8  - pt reports that she always gets premedicated before getting blood transfusions  - in setting of ruling out AIN, will give prednisone 50mg x 5 days (started 3/10)    #Misc:  - DVT Prophylaxis: SCDs  - GI Prophylaxis: protonix IV BID  - Diet: soft and bite sized, 1.2L fluid restriction  - Code Status: Full  - Dispo: anticipate dc on Monday [facility]  - Pending: vwf studies, Cr improvement, nephro f/u, urine eosinophils

## 2022-03-12 NOTE — PROGRESS NOTE ADULT - SUBJECTIVE AND OBJECTIVE BOX
Progress Note:  Provider Speciality                            Hospitalist      LATRICIA ARREAGA MRN-[id number] 76y Female     CHIEF PRESENTING COMPLAINT:  Patient is a 76y old  Female who presents with a chief complaint of Low Hb (11 Mar 2022 14:48)        SUBJECTIVE:  Patient was seen and examined at bedside. Reports improvement in  presenting complaint.   No significant overnight events reported.     HISTORY OF PRESENTING ILLNESS:  HPI:  Location: [facility] ER Hold 020 A ([facility] ER Hold)  Patient Name: LATRICIA ARREAGA  Age: 76y  Gender: Female      History of Present Illness    Ms. Arreaga is a 76 year old (ex smoker) female patient known to have:  - Severe AS. Last TTE 02/17/22 EF 74%, severe AS with A 0.8cm2 and mean P 48.9,  mild MR  - Anemia Likely Secondary to obscure GI bleeding (from Duodenum and Gastric AVM per recent capsule endoscopy 02/2022 after presenting with melena), Fe Deficiency (TFN S% 5 in 2020 and ferritin 28 in 2022), and CKD per Dr Silver. Received Transfusions q1-2 weeks and Retacrit 30726 units weekly  - Moderate Diverticulosis and Grade II Internal Hemorrhoids on colonoscopy 10/22/2019  - Erosive Gastritis on EGD 01/09/2020  - Hypertension  - CKD. Baseline Cr 1.7-2.2 2022  - Sciatica      She presented to the ED on 03/04 for evaluation of low Hb 4.5 on outpatient labs.  History goes back to yesterday when the patient had labs at [facility].  Hb came back as 4.5 today so Dr Silver asked her to come to ED.  Patient reports episodes of substernal chest discomfort and shortness of breath on minimal exertion associated with worsening leg swelling, orthopneas, and PNDs.  She denies light headedness, palpitations, syncope, falls, or diaphoresis.  She also denies any hematemesis, melena, hematochezia, or hemoptysis. Passing dark brown bowel movements almost on a daily basis (last time admitted with back stools was in 02/2022)      On review of systems, patient denies any recent fever, chills, night sweats, URTI symptoms (cough, rhinorrhea, sore throat), urinary symptoms (urinary frequency, urgency, intermittence, dysuria, foul smelling urine, cloudy urine), abdominal pain, headache, nausea, or vomiting.   No sick contacts.   No recent travel or exposure to recent travelers.      Upon presentation to the ED, the patient was hemodynamically stable:  Vital Signs in ED   - /44 mmHg  - HR 74 bpm  - RR 20 bpm  - T 97 degrees  - SaO2 99% on RA    Investigations   Laboratory Workup  - CBC:                        4.5    4.54  )-----------( 134      ( 04 Mar 2022 12:30 )             15.5     - Chemistry:  03-04    139  |  106  |  60<H>  ----------------------------<  94  5.3<H>   |  25  |  2.7<H>    Ca    8.3<L>      04 Mar 2022 12:30    TPro  5.7<L>  /  Alb  3.3<L>  /  TBili  0.6  /  DBili  x   /  AST  13  /  ALT  6   /  AlkPhos  66  03-04    - Coagulation Studies:  PT/INR - ( 04 Mar 2022 12:30 )   PT: 11.70 sec;   INR: 1.02 ratio    PTT - ( 04 Mar 2022 12:30 )  PTT:32.0 sec      Microbiological Workup      Radiological Workup  * CXR with Bilateral perihilar and lower lobe opacities and effusions, left greater than right.      - GI team evaluated patient in the ED  - OLEKSANDR with brown stool, no bright red blood  - 3 units of packed RBCs were ordered for patient  - Stat IV lasix 40mg x1 dose was administered (not more since BP on low side and patient has severe AS)  - Patient will be admitted for further investigations, management, and monitoring           (04 Mar 2022 15:57)        REVIEW OF SYSTEMS:  Patient denies any headache, any vision complaints, runny nose, fever, chills. Denies chest pain, shortness of breath, palpitation. Denies nausea, vomiting, abdominal pain or diarrhoea, Denies dysuria. Denies  localized weakness in any part of the body or numbness.   At least 10 systems were reviewed in ROS. All systems reviewed  are within normal limits except for the complaints as described in Subjective.    PAST MEDICAL & SURGICAL HISTORY:  PAST MEDICAL & SURGICAL HISTORY:  HTN (hypertension)    Heart murmur    Eczema    Anemia    Aortic stenosis    H/O appendicitis    H/O cholecystitis            VITAL SIGNS:  Vital Signs Last 24 Hrs  T(C): 36.6 (12 Mar 2022 07:20), Max: 36.6 (11 Mar 2022 16:02)  T(F): 97.8 (12 Mar 2022 07:20), Max: 97.9 (11 Mar 2022 16:02)  HR: 660 (12 Mar 2022 07:20) (62 - 660)  BP: 124/55 (12 Mar 2022 07:20) (124/55 - 132/61)  BP(mean): --  RR: 18 (12 Mar 2022 07:20) (18 - 18)  SpO2: --        PHYSICAL EXAMINATION:  Not in acute distress, obese  General: No icterus  HEENT:   no JVD.  Heart: S1+S2 audible  Lungs: bilateral  moderate air entry, no wheezing, no crepitations.  Abdomen: Soft, non-tender, non-distended , no  rigidity or guarding.  CNS: Awake alert, CN  grossly intact.  Extremities:   edema    LE improved        CONSULTS:  Consultant(s) Notes Reviewed by me.   Care Discussed with Consultants/Other Providers where required.        MEDICATIONS:  MEDICATIONS  (STANDING):  chlorhexidine 4% Liquid 1 Application(s) Topical <User Schedule>  metoprolol tartrate 25 milliGRAM(s) Oral two times a day  pantoprazole    Tablet 40 milliGRAM(s) Oral two times a day  predniSONE   Tablet 50 milliGRAM(s) Oral daily    MEDICATIONS  (PRN):  acetaminophen     Tablet .. 650 milliGRAM(s) Oral every 6 hours PRN Mild Pain (1 - 3)  aluminum hydroxide/magnesium hydroxide/simethicone Suspension 30 milliLiter(s) Oral every 4 hours PRN Dyspepsia  diphenhydrAMINE 25 milliGRAM(s) Oral every 4 hours PRN Rash and/or Itching  ondansetron Injectable 4 milliGRAM(s) IV Push every 8 hours PRN Nausea and/or Vomiting            ASSESSMENT:      75 y/o woman with PMH of severe AS, HTN, CKD 4, sciatica and anemia recently found to have evidence of bleeding from gastric AVM and duodenum on capsule endoscopy and currently on q1-2 weekly transfusions and retacrit weekly now presented to the ED on 03/04 for evaluation of low hemoglobin on outpatient labs.       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 H/H currently stable  GI-As per family wishes holding off on scope unless clinical deterioration  Possible DARRIN- Venofer x 3 days, Received retacrit 73281 U x 1 on 3/7  Severe AS-TAVR deferred for now once active GI bleed, anemia, and EVAN have resolve   EVAN over CKD 4 -possible CRS vs AIN. Started on po steroids. Get UA and urine pro/cr, UA for eosinophils   Acute HFpEF / severe AS. Possibly euvolemic now. Switched to Torsemide 20 mg . Lasix discontinued  Continue current medical management for active chronic comorbid conditions.  Protonic switched to PO  DVT Prophylaxis as appropriate  Supportive care including activity, diet, goals of care Attending Attestation:  Patient was seen & examined independently. At least 10 systems were reviewed in ROS. All systems reviewed  are within normal limits. Latest vital signs and labs were reviewed today. Case was discussed with house staff in morning rounds for assessment and plan.  Patient is medically stable for discharge . About 32 mins spent on discharge disposition.ussed in AM rounds.  Discussed with  / in AM rounds  Handoff: Disposition: [facility] when stable, not stable for discharge yet in lieu of EVAN, volume optimization

## 2022-03-12 NOTE — PROGRESS NOTE ADULT - ASSESSMENT
76y female w/ a PMHx of CKD IV (baseline Cr 1.7-2.2 from 2022), HFpEF EF 74%, Severe AS, Chronic anemia likely due to GI bleed per capsule endoscopy (2/2022) showing duodenum and gastric AVM (outpatient pt receives Procrit 20,000 units every other week, and transfusions every week), Fe deficiency, Grade II Diverticulosis, Internal hemorrhoids, erosive gastritis, HTN presenting from NH for low hemoglobin (4.5) per o/p labs as instructed by Dr. Silver. Pt c/o substernal chest discomfort, SOB, worsening leg swelling, and orthopnea. Pt also admits to passing dark stools almost daily.  Laura on CKD 4 - due to CRS vs AIN - new skin rash on torso and arm   started on prednisone   non oliguric   UA neg blood/prot  Kidney sono no hydro  if repeated k > 5.5, start lokelma 5 q 12  check ph    Creat stable / check repeat today   GI bleed , s/l blood tx , follow h/h, on venofer   AS - will need a CTA  will follow

## 2022-03-12 NOTE — PROGRESS NOTE ADULT - SUBJECTIVE AND OBJECTIVE BOX
Patient is a 76y old  Female who presents with a chief complaint of Low Hb (11 Mar 2022 15:29)    INTERVAL HPI/OVERNIGHT EVENTS:   No overnight events   Afebrile, hemodynamically stable     Vital Signs Last 24 Hrs  T(C): 36.3 (12 Mar 2022 00:11), Max: 36.6 (11 Mar 2022 16:02)  T(F): 97.4 (12 Mar 2022 00:11), Max: 97.9 (11 Mar 2022 16:02)  HR: 62 (12 Mar 2022 00:11) (62 - 74)  BP: 132/61 (12 Mar 2022 00:11) (128/59 - 132/62)  BP(mean): --  ABP: --  ABP(mean): --  RR: 18 (12 Mar 2022 00:11) (18 - 18)  SpO2: --    I&O's Summary    11 Mar 2022 07:01  -  12 Mar 2022 07:00  --------------------------------------------------------  IN: 1000 mL / OUT: 1120 mL / NET: -120 mL          LABS:                        8.2    2.43  )-----------( 126      ( 11 Mar 2022 07:04 )             26.7     03-11    146  |  108  |  66<HH>  ----------------------------<  141<H>  5.2<H>   |  28  |  2.9<H>    Ca    8.6      11 Mar 2022 07:04  Mg     2.1     03-11    TPro  5.5<L>  /  Alb  3.3<L>  /  TBili  0.6  /  DBili  x   /  AST  10  /  ALT  7   /  AlkPhos  59  03-11      Urinalysis Basic - ( 10 Mar 2022 18:13 )    Color: Light Yellow / Appearance: Clear / S.013 / pH: x  Gluc: x / Ketone: Negative  / Bili: Negative / Urobili: <2 mg/dL   Blood: x / Protein: Negative / Nitrite: Negative   Leuk Esterase: Negative / RBC: x / WBC x   Sq Epi: x / Non Sq Epi: x / Bacteria: x      CAPILLARY BLOOD GLUCOSE      POCT Blood Glucose.: 136 mg/dL (11 Mar 2022 07:33)        RADIOLOGY & ADDITIONAL TESTS:    Consultant(s) Notes Reviewed:  [x ] YES  [ ] NO    MEDICATIONS  (STANDING):  chlorhexidine 4% Liquid 1 Application(s) Topical <User Schedule>  metoprolol tartrate 25 milliGRAM(s) Oral two times a day  pantoprazole    Tablet 40 milliGRAM(s) Oral two times a day  predniSONE   Tablet 50 milliGRAM(s) Oral daily  torsemide 20 milliGRAM(s) Oral daily    MEDICATIONS  (PRN):  acetaminophen     Tablet .. 650 milliGRAM(s) Oral every 6 hours PRN Mild Pain (1 - 3)  aluminum hydroxide/magnesium hydroxide/simethicone Suspension 30 milliLiter(s) Oral every 4 hours PRN Dyspepsia  diphenhydrAMINE 25 milliGRAM(s) Oral every 4 hours PRN Rash and/or Itching  ondansetron Injectable 4 milliGRAM(s) IV Push every 8 hours PRN Nausea and/or Vomiting      PHYSICAL EXAM:  GENERAL: in NAD  HEENT: atraumatic  Cardiac: 3/6 systolic murmur  Respiratory: decreased bilateral breath sounds (L>R)  Abdomen: no tenderness to palpation. bowel sounds present  Extremities: bilateral 1+ pitting edema (R>L)  Skin: no rashes or lesions   Patient is a 76y old  Female who presents with a chief complaint of Low Hb (11 Mar 2022 15:29)    INTERVAL HPI/OVERNIGHT EVENTS:   No overnight events   Afebrile, hemodynamically stable     Vital Signs Last 24 Hrs  T(C): 36.3 (12 Mar 2022 00:11), Max: 36.6 (11 Mar 2022 16:02)  T(F): 97.4 (12 Mar 2022 00:11), Max: 97.9 (11 Mar 2022 16:02)  HR: 62 (12 Mar 2022 00:11) (62 - 74)  BP: 132/61 (12 Mar 2022 00:11) (128/59 - 132/62)  BP(mean): --  ABP: --  ABP(mean): --  RR: 18 (12 Mar 2022 00:11) (18 - 18)  SpO2: --    I&O's Summary    11 Mar 2022 07:01  -  12 Mar 2022 07:00  --------------------------------------------------------  IN: 1000 mL / OUT: 1120 mL / NET: -120 mL          LABS:                        8.2    2.43  )-----------( 126      ( 11 Mar 2022 07:04 )             26.7     03-11    146  |  108  |  66<HH>  ----------------------------<  141<H>  5.2<H>   |  28  |  2.9<H>    Ca    8.6      11 Mar 2022 07:04  Mg     2.1     03-11    TPro  5.5<L>  /  Alb  3.3<L>  /  TBili  0.6  /  DBili  x   /  AST  10  /  ALT  7   /  AlkPhos  59  03-11      Urinalysis Basic - ( 10 Mar 2022 18:13 )    Color: Light Yellow / Appearance: Clear / S.013 / pH: x  Gluc: x / Ketone: Negative  / Bili: Negative / Urobili: <2 mg/dL   Blood: x / Protein: Negative / Nitrite: Negative   Leuk Esterase: Negative / RBC: x / WBC x   Sq Epi: x / Non Sq Epi: x / Bacteria: x      CAPILLARY BLOOD GLUCOSE      POCT Blood Glucose.: 136 mg/dL (11 Mar 2022 07:33)        RADIOLOGY & ADDITIONAL TESTS:    Consultant(s) Notes Reviewed:  [x ] YES  [ ] NO    MEDICATIONS  (STANDING):  chlorhexidine 4% Liquid 1 Application(s) Topical <User Schedule>  metoprolol tartrate 25 milliGRAM(s) Oral two times a day  pantoprazole    Tablet 40 milliGRAM(s) Oral two times a day  predniSONE   Tablet 50 milliGRAM(s) Oral daily  torsemide 20 milliGRAM(s) Oral daily    MEDICATIONS  (PRN):  acetaminophen     Tablet .. 650 milliGRAM(s) Oral every 6 hours PRN Mild Pain (1 - 3)  aluminum hydroxide/magnesium hydroxide/simethicone Suspension 30 milliLiter(s) Oral every 4 hours PRN Dyspepsia  diphenhydrAMINE 25 milliGRAM(s) Oral every 4 hours PRN Rash and/or Itching  ondansetron Injectable 4 milliGRAM(s) IV Push every 8 hours PRN Nausea and/or Vomiting      PHYSICAL EXAM:  GENERAL: in NAD  HEENT: atraumatic  Cardiac: 3/6 systolic murmur  Respiratory: decreased bilateral breath sounds (L>R)  Abdomen: no tenderness to palpation. bowel sounds present  Extremities: bilateral 1+ pitting edema (R>L), chronic  Skin: no rashes or lesions

## 2022-03-12 NOTE — PROGRESS NOTE ADULT - SUBJECTIVE AND OBJECTIVE BOX
seen and examined  no distress   lying comfortable         PAST HISTORY  --------------------------------------------------------------------------------  No significant changes to PMH, PSH, FHx, SHx, unless otherwise noted    ALLERGIES & MEDICATIONS  --------------------------------------------------------------------------------  Allergies    aspirin (Rash)  latex (Unknown)  penicillins (Unknown)    Intolerances      Standing Inpatient Medications  chlorhexidine 4% Liquid 1 Application(s) Topical <User Schedule>  metoprolol tartrate 25 milliGRAM(s) Oral two times a day  pantoprazole    Tablet 40 milliGRAM(s) Oral two times a day  predniSONE   Tablet 50 milliGRAM(s) Oral daily  torsemide 20 milliGRAM(s) Oral daily    PRN Inpatient Medications  acetaminophen     Tablet .. 650 milliGRAM(s) Oral every 6 hours PRN  aluminum hydroxide/magnesium hydroxide/simethicone Suspension 30 milliLiter(s) Oral every 4 hours PRN  diphenhydrAMINE 25 milliGRAM(s) Oral every 4 hours PRN  ondansetron Injectable 4 milliGRAM(s) IV Push every 8 hours PRN      VITALS/PHYSICAL EXAM  --------------------------------------------------------------------------------  T(C): 36.3 (03-12-22 @ 00:11), Max: 36.6 (03-11-22 @ 16:02)  HR: 62 (03-12-22 @ 00:11) (62 - 74)  BP: 132/61 (03-12-22 @ 00:11) (128/59 - 132/62)  RR: 18 (03-12-22 @ 00:11) (18 - 18)  SpO2: --  Wt(kg): --        03-11-22 @ 07:01  -  03-12-22 @ 07:00  --------------------------------------------------------  IN: 1000 mL / OUT: 1120 mL / NET: -120 mL      Physical Exam:  	Gen: NAD  	Pulm: decrease BS B/L  	CV:  S1S2; no rub  	Abd: +distended  	    LABS/STUDIES  --------------------------------------------------------------------------------              8.2    2.43  >-----------<  126      [03-11-22 @ 07:04]              26.7     146  |  108  |  66  ----------------------------<  141      [03-11-22 @ 07:04]  5.2   |  28  |  2.9        Ca     8.6     [03-11-22 @ 07:04]      Mg     2.1     [03-11-22 @ 07:04]    TPro  5.5  /  Alb  3.3  /  TBili  0.6  /  DBili  x   /  AST  10  /  ALT  7   /  AlkPhos  59  [03-11-22 @ 07:04]      Creatinine Trend:  SCr 2.9 [03-11 @ 07:04]  SCr 3.1 [03-10 @ 04:30]  SCr 3.3 [03-09 @ 06:25]  SCr 3.3 [03-08 @ 08:39]  SCr 3.2 [03-07 @ 06:30]    Urinalysis - [03-10-22 @ 18:13]      Color Light Yellow / Appearance Clear / SG 1.013 / pH 6.0      Gluc Negative / Ketone Negative  / Bili Negative / Urobili <2 mg/dL       Blood Negative / Protein Negative / Leuk Est Negative / Nitrite Negative      RBC  / WBC  / Hyaline  / Gran  / Sq Epi  / Non Sq Epi  / Bacteria     Urine Creatinine 128      [03-11-22 @ 23:50]  Urine Sodium 30.0      [03-11-22 @ 23:50]    Iron 43, TIBC 294, %sat 15      [03-07-22 @ 12:22]  Ferritin 41      [03-07-22 @ 12:22]  Lipid: chol 93, , HDL 38, LDL --      [01-18-22 @ 06:03]

## 2022-03-13 LAB
ALBUMIN SERPL ELPH-MCNC: 3.5 G/DL — SIGNIFICANT CHANGE UP (ref 3.5–5.2)
ALP SERPL-CCNC: 54 U/L — SIGNIFICANT CHANGE UP (ref 30–115)
ALT FLD-CCNC: 9 U/L — SIGNIFICANT CHANGE UP (ref 0–41)
ANION GAP SERPL CALC-SCNC: 12 MMOL/L — SIGNIFICANT CHANGE UP (ref 7–14)
AST SERPL-CCNC: 13 U/L — SIGNIFICANT CHANGE UP (ref 0–41)
BASOPHILS # BLD AUTO: 0.02 K/UL — SIGNIFICANT CHANGE UP (ref 0–0.2)
BASOPHILS NFR BLD AUTO: 0.3 % — SIGNIFICANT CHANGE UP (ref 0–1)
BILIRUB SERPL-MCNC: 0.6 MG/DL — SIGNIFICANT CHANGE UP (ref 0.2–1.2)
BUN SERPL-MCNC: 74 MG/DL — CRITICAL HIGH (ref 10–20)
CALCIUM SERPL-MCNC: 8.6 MG/DL — SIGNIFICANT CHANGE UP (ref 8.5–10.1)
CHLORIDE SERPL-SCNC: 109 MMOL/L — SIGNIFICANT CHANGE UP (ref 98–110)
CO2 SERPL-SCNC: 28 MMOL/L — SIGNIFICANT CHANGE UP (ref 17–32)
CREAT SERPL-MCNC: 2.9 MG/DL — HIGH (ref 0.7–1.5)
EGFR: 16 ML/MIN/1.73M2 — LOW
EOSINOPHIL # BLD AUTO: 0.57 K/UL — SIGNIFICANT CHANGE UP (ref 0–0.7)
EOSINOPHIL NFR BLD AUTO: 9.5 % — HIGH (ref 0–8)
GLUCOSE SERPL-MCNC: 96 MG/DL — SIGNIFICANT CHANGE UP (ref 70–99)
HCT VFR BLD CALC: 27.1 % — LOW (ref 37–47)
HGB BLD-MCNC: 8.1 G/DL — LOW (ref 12–16)
IMM GRANULOCYTES NFR BLD AUTO: 0.7 % — HIGH (ref 0.1–0.3)
LYMPHOCYTES # BLD AUTO: 0.77 K/UL — LOW (ref 1.2–3.4)
LYMPHOCYTES # BLD AUTO: 12.8 % — LOW (ref 20.5–51.1)
MAGNESIUM SERPL-MCNC: 2.1 MG/DL — SIGNIFICANT CHANGE UP (ref 1.8–2.4)
MCHC RBC-ENTMCNC: 28.5 PG — SIGNIFICANT CHANGE UP (ref 27–31)
MCHC RBC-ENTMCNC: 29.9 G/DL — LOW (ref 32–37)
MCV RBC AUTO: 95.4 FL — SIGNIFICANT CHANGE UP (ref 81–99)
MONOCYTES # BLD AUTO: 0.43 K/UL — SIGNIFICANT CHANGE UP (ref 0.1–0.6)
MONOCYTES NFR BLD AUTO: 7.2 % — SIGNIFICANT CHANGE UP (ref 1.7–9.3)
NEUTROPHILS # BLD AUTO: 4.17 K/UL — SIGNIFICANT CHANGE UP (ref 1.4–6.5)
NEUTROPHILS NFR BLD AUTO: 69.5 % — SIGNIFICANT CHANGE UP (ref 42.2–75.2)
NRBC # BLD: 0 /100 WBCS — SIGNIFICANT CHANGE UP (ref 0–0)
PLATELET # BLD AUTO: 140 K/UL — SIGNIFICANT CHANGE UP (ref 130–400)
POTASSIUM SERPL-MCNC: 5 MMOL/L — SIGNIFICANT CHANGE UP (ref 3.5–5)
POTASSIUM SERPL-SCNC: 5 MMOL/L — SIGNIFICANT CHANGE UP (ref 3.5–5)
PROT SERPL-MCNC: 5.4 G/DL — LOW (ref 6–8)
RBC # BLD: 2.84 M/UL — LOW (ref 4.2–5.4)
RBC # FLD: 19.8 % — HIGH (ref 11.5–14.5)
SODIUM SERPL-SCNC: 149 MMOL/L — HIGH (ref 135–146)
WBC # BLD: 6 K/UL — SIGNIFICANT CHANGE UP (ref 4.8–10.8)
WBC # FLD AUTO: 6 K/UL — SIGNIFICANT CHANGE UP (ref 4.8–10.8)

## 2022-03-13 PROCEDURE — 99233 SBSQ HOSP IP/OBS HIGH 50: CPT

## 2022-03-13 RX ORDER — DIPHENHYDRAMINE HCL 50 MG
1 CAPSULE ORAL
Qty: 0 | Refills: 0 | DISCHARGE
Start: 2022-03-13

## 2022-03-13 RX ORDER — PANTOPRAZOLE SODIUM 20 MG/1
1 TABLET, DELAYED RELEASE ORAL
Qty: 0 | Refills: 0 | DISCHARGE
Start: 2022-03-13

## 2022-03-13 RX ADMIN — PANTOPRAZOLE SODIUM 40 MILLIGRAM(S): 20 TABLET, DELAYED RELEASE ORAL at 05:32

## 2022-03-13 RX ADMIN — Medication 20 MILLIGRAM(S): at 05:32

## 2022-03-13 RX ADMIN — CHLORHEXIDINE GLUCONATE 1 APPLICATION(S): 213 SOLUTION TOPICAL at 06:19

## 2022-03-13 RX ADMIN — Medication 25 MILLIGRAM(S): at 17:38

## 2022-03-13 RX ADMIN — Medication 50 MILLIGRAM(S): at 05:33

## 2022-03-13 RX ADMIN — PANTOPRAZOLE SODIUM 40 MILLIGRAM(S): 20 TABLET, DELAYED RELEASE ORAL at 17:38

## 2022-03-13 RX ADMIN — Medication 25 MILLIGRAM(S): at 05:32

## 2022-03-13 RX ADMIN — Medication 25 MILLIGRAM(S): at 21:31

## 2022-03-13 NOTE — PROGRESS NOTE ADULT - SUBJECTIVE AND OBJECTIVE BOX
Nephrology Progress Note    LATRICIA ARREAGA  MRN-[id number]  76y  Female    S:  Patient is seen and examined, events over the last 24h noted.    O:  Allergies:  aspirin (Rash)  latex (Unknown)  penicillins (Unknown)    Hospital Medications:   MEDICATIONS  (STANDING):  chlorhexidine 4% Liquid 1 Application(s) Topical <User Schedule>  metoprolol tartrate 25 milliGRAM(s) Oral two times a day  pantoprazole    Tablet 40 milliGRAM(s) Oral two times a day  predniSONE   Tablet 50 milliGRAM(s) Oral daily  torsemide 20 milliGRAM(s) Oral daily    MEDICATIONS  (PRN):  acetaminophen     Tablet .. 650 milliGRAM(s) Oral every 6 hours PRN Mild Pain (1 - 3)  aluminum hydroxide/magnesium hydroxide/simethicone Suspension 30 milliLiter(s) Oral every 4 hours PRN Dyspepsia  diphenhydrAMINE 25 milliGRAM(s) Oral every 4 hours PRN Rash and/or Itching  ondansetron Injectable 4 milliGRAM(s) IV Push every 8 hours PRN Nausea and/or Vomiting    Home Medications:  aluminum hydroxide-magnesium hydroxide 200 mg-200 mg/5 mL oral suspension: 30 milliliter(s) orally every 4 hours, As needed, Dyspepsia (2022 12:15)  diphenhydrAMINE 25 mg oral capsule: 1 cap(s) orally every 4 hours, As needed, Rash and/or Itching (13 Mar 2022 00:13)  melatonin 3 mg oral tablet: 1 tab(s) orally once a day (at bedtime), As needed, Insomnia (2022 12:15)  Metoprolol Tartrate 25 mg oral tablet: 1 tab(s) orally 2 times a day (2022 00:11)  pantoprazole 40 mg oral delayed release tablet: 1 tab(s) orally 2 times a day (13 Mar 2022 00:13)      VITALS:  T(F): 96.5 (22 @ 08:01), Max: 97.8 (22 @ 16:00)  HR: 60 (22 @ 08:01)  BP: 122/65 (22 @ 08:01)  RR: 18 (22 @ 08:01)  SpO2: 97% (03-13-22 @ 00:00)  Wt(kg): --  I&O's Detail    12 Mar 2022 06:01  -  13 Mar 2022 07:00  --------------------------------------------------------  IN:  Total IN: 0 mL    OUT:    Voided (mL): 1500 mL  Total OUT: 1500 mL    Total NET: -1500 mL      13 Mar 2022 07:01  -  13 Mar 2022 13:56  --------------------------------------------------------  IN:    Oral Fluid: 360 mL  Total IN: 360 mL    OUT:  Total OUT: 0 mL    Total NET: 360 mL        I&O's Summary    12 Mar 2022 06:01  -  13 Mar 2022 07:00  --------------------------------------------------------  IN: 0 mL / OUT: 1500 mL / NET: -1500 mL    13 Mar 2022 07:01  -  13 Mar 2022 13:56  --------------------------------------------------------  IN: 360 mL / OUT: 0 mL / NET: 360 mL          PHYSICAL EXAM:  Gen: NAD  Resp: CTAB  Card: S1/S2  Abd: soft  Extremities: trace LE edema      LABS:          149<H>  |  109  |  74<HH>  ----------------------------<  96  5.0   |  28  |  2.9<H>    Ca    8.6      13 Mar 2022 07:45  Phos  3.9       Mg     2.1         TPro  5.4<L>  /  Alb  3.5  /  TBili  0.6  /  DBili      /  AST  13  /  ALT  9   /  AlkPhos  54      eGFR if Non African American: 16 mL/min/1.73M2 (22 @ 04:30)  eGFR if African American: 18 mL/min/1.73M2 (22 @ 04:30)    Phosphorus Level, Serum: 3.9 mg/dL (22 @ 09:31)                            8.1    6.00  )-----------( 140      ( 13 Mar 2022 07:45 )             27.1     Mean Cell Volume: 95.4 fL (22 @ 07:45)    % Saturation, Iron: 15 % (22 @ 12:22)  Ferritin, Serum: 41 ng/mL (22 @ 12:22)      Urine Studies:  Urinalysis Basic - ( 10 Mar 2022 18:13 )    Color: Light Yellow / Appearance: Clear / S.013 / pH:   Gluc:  / Ketone: Negative  / Bili: Negative / Urobili: <2 mg/dL   Blood:  / Protein: Negative / Nitrite: Negative   Leuk Esterase: Negative / RBC:  / WBC    Sq Epi:  / Non Sq Epi:  / Bacteria:         Urea Nitrogen,  Random Urine: 449 mg/dL (22 @ 18:30)    Sodium, Random Urine: 30.0 mmoL/L ( @ 23:50)  Creatinine, Random Urine: 128 mg/dL ( @ 23:50)    Creatinine trend:  Creatinine, Serum: 2.9 mg/dL (22 @ 07:45)  Creatinine, Serum: 3.0 mg/dL (22 @ 07:36)  Creatinine, Serum: 2.9 mg/dL (22 @ 07:04)  Creatinine, Serum: 3.1 mg/dL (03-10-22 @ 04:30)  Creatinine, Serum: 3.3 mg/dL (22 @ 06:25)  Creatinine, Serum: 3.3 mg/dL (22 @ 08:39)    Serum Protein Electrophoresis Interp: Weak Gamma-Migrating Paraprotein Identified (22 @ 05:43)  Immunofixation, Serum:   Weak IgG Lambda Band Identified    Reference Range: None Detected (22 @ 05:43)  FABY Lambda: 7.77 mg/dL (22 @ 05:43)      US Kidney and Bladder:   ACC: [id number] EXAM:  US KIDNEYS AND BLADDER                          PROCEDURE DATE:  2022          INTERPRETATION:  CLINICAL INFORMATION: Hydronephrosis.    COMPARISON: 2022    TECHNIQUE: Sonography of the kidneys and bladder.    FINDINGS: Limited evaluation due to body habitus.    Right kidney: 10.4 cm. No hydronephrosis or calculi.    Left kidney:  10.0 cm. No hydronephrosis or calculi.    Urinary bladder: Empty, limiting assessment.    IMPRESSION:    Limited evaluation due to body habitus.  No stones or hydronephrosis.        --- End of Report ---            OSCAR GALINDO MD; Attending Radiologist  This document has been electronically signed. Mar  9 2022  2:41PM (22 @ 14:31)

## 2022-03-13 NOTE — PROGRESS NOTE ADULT - SUBJECTIVE AND OBJECTIVE BOX
Progress Note:  Provider Speciality                            Hospitalist      LATRICIA ARREAGA MRN-[id number] 76y Female     CHIEF PRESENTING COMPLAINT:  Patient is a 76y old  Female who presents with a chief complaint of Low Hb (11 Mar 2022 14:48)        SUBJECTIVE:  Patient was seen and examined at bedside. Reports improvement in  presenting complaint.   No significant overnight events reported.     HISTORY OF PRESENTING ILLNESS:  HPI:  Location: [facility] ER Hold 020 A ([facility] ER Hold)  Patient Name: LATRICIA ARREAGA  Age: 76y  Gender: Female      History of Present Illness    Ms. Arreaga is a 76 year old (ex smoker) female patient known to have:  - Severe AS. Last TTE 02/17/22 EF 74%, severe AS with A 0.8cm2 and mean P 48.9,  mild MR  - Anemia Likely Secondary to obscure GI bleeding (from Duodenum and Gastric AVM per recent capsule endoscopy 02/2022 after presenting with melena), Fe Deficiency (TFN S% 5 in 2020 and ferritin 28 in 2022), and CKD per Dr Silver. Received Transfusions q1-2 weeks and Retacrit 26494 units weekly  - Moderate Diverticulosis and Grade II Internal Hemorrhoids on colonoscopy 10/22/2019  - Erosive Gastritis on EGD 01/09/2020  - Hypertension  - CKD. Baseline Cr 1.7-2.2 2022  - Sciatica      She presented to the ED on 03/04 for evaluation of low Hb 4.5 on outpatient labs.  History goes back to yesterday when the patient had labs at [facility].  Hb came back as 4.5 today so Dr Silver asked her to come to ED.  Patient reports episodes of substernal chest discomfort and shortness of breath on minimal exertion associated with worsening leg swelling, orthopneas, and PNDs.  She denies light headedness, palpitations, syncope, falls, or diaphoresis.  She also denies any hematemesis, melena, hematochezia, or hemoptysis. Passing dark brown bowel movements almost on a daily basis (last time admitted with back stools was in 02/2022)      On review of systems, patient denies any recent fever, chills, night sweats, URTI symptoms (cough, rhinorrhea, sore throat), urinary symptoms (urinary frequency, urgency, intermittence, dysuria, foul smelling urine, cloudy urine), abdominal pain, headache, nausea, or vomiting.   No sick contacts.   No recent travel or exposure to recent travelers.      Upon presentation to the ED, the patient was hemodynamically stable:  Vital Signs in ED   - /44 mmHg  - HR 74 bpm  - RR 20 bpm  - T 97 degrees  - SaO2 99% on RA    Investigations   Laboratory Workup  - CBC:                        4.5    4.54  )-----------( 134      ( 04 Mar 2022 12:30 )             15.5     - Chemistry:  03-04    139  |  106  |  60<H>  ----------------------------<  94  5.3<H>   |  25  |  2.7<H>    Ca    8.3<L>      04 Mar 2022 12:30    TPro  5.7<L>  /  Alb  3.3<L>  /  TBili  0.6  /  DBili  x   /  AST  13  /  ALT  6   /  AlkPhos  66  03-04    - Coagulation Studies:  PT/INR - ( 04 Mar 2022 12:30 )   PT: 11.70 sec;   INR: 1.02 ratio    PTT - ( 04 Mar 2022 12:30 )  PTT:32.0 sec      Microbiological Workup      Radiological Workup  * CXR with Bilateral perihilar and lower lobe opacities and effusions, left greater than right.      - GI team evaluated patient in the ED  - OLEKSANDR with brown stool, no bright red blood  - 3 units of packed RBCs were ordered for patient  - Stat IV lasix 40mg x1 dose was administered (not more since BP on low side and patient has severe AS)  - Patient will be admitted for further investigations, management, and monitoring           (04 Mar 2022 15:57)        REVIEW OF SYSTEMS:  Patient denies any headache, any vision complaints, runny nose, fever, chills. Denies chest pain, shortness of breath, palpitation. Denies nausea, vomiting, abdominal pain or diarrhoea, Denies dysuria. Denies  localized weakness in any part of the body or numbness.   At least 10 systems were reviewed in ROS. All systems reviewed  are within normal limits except for the complaints as described in Subjective.    PAST MEDICAL & SURGICAL HISTORY:  PAST MEDICAL & SURGICAL HISTORY:  HTN (hypertension)    Heart murmur    Eczema    Anemia    Aortic stenosis    H/O appendicitis    H/O cholecystitis            VITAL SIGNS:      PHYSICAL EXAMINATION:  Not in acute distress, obese  General: No icterus  HEENT:   no JVD.  Heart: S1+S2 audible  Lungs: bilateral  moderate air entry, no wheezing, no crepitations.  Abdomen: Soft, non-tender, non-distended , no  rigidity or guarding.  CNS: Awake alert, CN  grossly intact.  Extremities:   edema    LE improved        CONSULTS:  Consultant(s) Notes Reviewed by me.   Care Discussed with Consultants/Other Providers where required.        MEDICATIONS:  MEDICATIONS  (STANDING):  chlorhexidine 4% Liquid 1 Application(s) Topical <User Schedule>  metoprolol tartrate 25 milliGRAM(s) Oral two times a day  pantoprazole    Tablet 40 milliGRAM(s) Oral two times a day  predniSONE   Tablet 50 milliGRAM(s) Oral daily    MEDICATIONS  (PRN):  acetaminophen     Tablet .. 650 milliGRAM(s) Oral every 6 hours PRN Mild Pain (1 - 3)  aluminum hydroxide/magnesium hydroxide/simethicone Suspension 30 milliLiter(s) Oral every 4 hours PRN Dyspepsia  diphenhydrAMINE 25 milliGRAM(s) Oral every 4 hours PRN Rash and/or Itching  ondansetron Injectable 4 milliGRAM(s) IV Push every 8 hours PRN Nausea and/or Vomiting            ASSESSMENT:      77 y/o woman with PMH of severe AS, HTN, CKD 4, sciatica and anemia recently found to have evidence of bleeding from gastric AVM and duodenum on capsule endoscopy and currently on q1-2 weekly transfusions and retacrit weekly now presented to the ED on 03/04 for evaluation of low hemoglobin on outpatient labs.       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 H/H currently stable  GI-As per family wishes holding off on scope unless clinical deterioration  Possible DARRIN- Venofer x 3 days, Received retacrit [id number] U x 1 on 3/7  Severe AS-TAVR deferred for now once active GI bleed, anemia, and EVAN have resolve   EVAN over CKD 4 -possible CRS vs AIN. Started on po steroids. Get UA and urine pro/cr, UA for eosinophils   Acute HFpEF / severe AS. Possibly euvolemic now. Switched to Torsemide 20 mg . Lasix discontinued  Continue current medical management for active chronic comorbid conditions.  Protonic switched to PO  DVT Prophylaxis as appropriate  Supportive care including activity, diet, goals of care Attending Attestation:  Patient was seen & examined independently. At least 10 systems were reviewed in ROS. All systems reviewed  are within normal limits. Latest vital signs and labs were reviewed today. Case was discussed with house staff in morning rounds for assessment and plan.  Patient is medically stable for discharge . About 32 mins spent on discharge disposition.ussed in AM rounds.  Discussed with  / in AM rounds  Handoff: Disposition: [facility] when stable, not stable for discharge yet in lieu of EVAN, volume optimization         Progress Note:  Provider Speciality                            Hospitalist      LATRICIA ARREAGA MRN-[id number] 76y Female     CHIEF PRESENTING COMPLAINT:  Patient is a 76y old  Female who presents with a chief complaint of Low Hb (11 Mar 2022 14:48)        SUBJECTIVE:  Patient was seen and examined at bedside. Reports improvement in  presenting complaint.   No significant overnight events reported.     HISTORY OF PRESENTING ILLNESS:  HPI:  Location: [facility] ER Hold 020 A ([facility] ER Hold)  Patient Name: LATRICIA ARREAGA  Age: 76y  Gender: Female      History of Present Illness    Ms. Arreaga is a 76 year old (ex smoker) female patient known to have:  - Severe AS. Last TTE 02/17/22 EF 74%, severe AS with A 0.8cm2 and mean P 48.9,  mild MR  - Anemia Likely Secondary to obscure GI bleeding (from Duodenum and Gastric AVM per recent capsule endoscopy 02/2022 after presenting with melena), Fe Deficiency (TFN S% 5 in 2020 and ferritin 28 in 2022), and CKD per Dr Silver. Received Transfusions q1-2 weeks and Retacrit 16205 units weekly  - Moderate Diverticulosis and Grade II Internal Hemorrhoids on colonoscopy 10/22/2019  - Erosive Gastritis on EGD 01/09/2020  - Hypertension  - CKD. Baseline Cr 1.7-2.2 2022  - Sciatica      She presented to the ED on 03/04 for evaluation of low Hb 4.5 on outpatient labs.  History goes back to yesterday when the patient had labs at [facility].  Hb came back as 4.5 today so Dr Silver asked her to come to ED.  Patient reports episodes of substernal chest discomfort and shortness of breath on minimal exertion associated with worsening leg swelling, orthopneas, and PNDs.  She denies light headedness, palpitations, syncope, falls, or diaphoresis.  She also denies any hematemesis, melena, hematochezia, or hemoptysis. Passing dark brown bowel movements almost on a daily basis (last time admitted with back stools was in 02/2022)      On review of systems, patient denies any recent fever, chills, night sweats, URTI symptoms (cough, rhinorrhea, sore throat), urinary symptoms (urinary frequency, urgency, intermittence, dysuria, foul smelling urine, cloudy urine), abdominal pain, headache, nausea, or vomiting.   No sick contacts.   No recent travel or exposure to recent travelers.      Upon presentation to the ED, the patient was hemodynamically stable:  Vital Signs in ED   - /44 mmHg  - HR 74 bpm  - RR 20 bpm  - T 97 degrees  - SaO2 99% on RA    Investigations   Laboratory Workup  - CBC:                        4.5    4.54  )-----------( 134      ( 04 Mar 2022 12:30 )             15.5     - Chemistry:  03-04    139  |  106  |  60<H>  ----------------------------<  94  5.3<H>   |  25  |  2.7<H>    Ca    8.3<L>      04 Mar 2022 12:30    TPro  5.7<L>  /  Alb  3.3<L>  /  TBili  0.6  /  DBili  x   /  AST  13  /  ALT  6   /  AlkPhos  66  03-04    - Coagulation Studies:  PT/INR - ( 04 Mar 2022 12:30 )   PT: 11.70 sec;   INR: 1.02 ratio    PTT - ( 04 Mar 2022 12:30 )  PTT:32.0 sec      Microbiological Workup      Radiological Workup  * CXR with Bilateral perihilar and lower lobe opacities and effusions, left greater than right.      - GI team evaluated patient in the ED  - OLEKSANDR with brown stool, no bright red blood  - 3 units of packed RBCs were ordered for patient  - Stat IV lasix 40mg x1 dose was administered (not more since BP on low side and patient has severe AS)  - Patient will be admitted for further investigations, management, and monitoring           (04 Mar 2022 15:57)        REVIEW OF SYSTEMS:  Patient denies any headache, any vision complaints, runny nose, fever, chills. Denies chest pain, shortness of breath, palpitation. Denies nausea, vomiting, abdominal pain or diarrhoea, Denies dysuria. Denies  localized weakness in any part of the body or numbness.   At least 10 systems were reviewed in ROS. All systems reviewed  are within normal limits except for the complaints as described in Subjective.    PAST MEDICAL & SURGICAL HISTORY:  PAST MEDICAL & SURGICAL HISTORY:  HTN (hypertension)    Heart murmur    Eczema    Anemia    Aortic stenosis    H/O appendicitis    H/O cholecystitis            VITAL SIGNS:  Vital Signs Last 24 Hrs  T(C): 35.8 (13 Mar 2022 08:01), Max: 36.6 (12 Mar 2022 16:00)  T(F): 96.5 (13 Mar 2022 08:01), Max: 97.8 (12 Mar 2022 16:00)  HR: 60 (13 Mar 2022 08:01) (50 - 60)  BP: 122/65 (13 Mar 2022 08:01) (122/65 - 166/71)  BP(mean): --  RR: 18 (13 Mar 2022 08:01) (18 - 18)  SpO2: 97% (13 Mar 2022 00:00) (93% - 97%)    PHYSICAL EXAMINATION:  Not in acute distress, obese  General: No icterus  HEENT:   no JVD.  Heart: S1+S2 audible  Lungs: bilateral  moderate air entry, no wheezing, no crepitations.  Abdomen: Soft, non-tender, non-distended , no  rigidity or guarding.  CNS: Awake alert, CN  grossly intact.  Extremities:   edema    LE improved        CONSULTS:  Consultant(s) Notes Reviewed by me.   Care Discussed with Consultants/Other Providers where required.        MEDICATIONS:  MEDICATIONS  (STANDING):  chlorhexidine 4% Liquid 1 Application(s) Topical <User Schedule>  metoprolol tartrate 25 milliGRAM(s) Oral two times a day  pantoprazole    Tablet 40 milliGRAM(s) Oral two times a day  predniSONE   Tablet 50 milliGRAM(s) Oral daily    MEDICATIONS  (PRN):  acetaminophen     Tablet .. 650 milliGRAM(s) Oral every 6 hours PRN Mild Pain (1 - 3)  aluminum hydroxide/magnesium hydroxide/simethicone Suspension 30 milliLiter(s) Oral every 4 hours PRN Dyspepsia  diphenhydrAMINE 25 milliGRAM(s) Oral every 4 hours PRN Rash and/or Itching  ondansetron Injectable 4 milliGRAM(s) IV Push every 8 hours PRN Nausea and/or Vomiting            ASSESSMENT:      77 y/o woman with PMH of severe AS, HTN, CKD 4, sciatica and anemia recently found to have evidence of bleeding from gastric AVM and duodenum on capsule endoscopy and currently on q1-2 weekly transfusions and retacrit weekly now presented to the ED on 03/04 for evaluation of low hemoglobin on outpatient labs.       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 H/H currently stable  GI-As per family wishes holding off on scope unless clinical deterioration  Possible DARRIN- Venofer x 3 days, Received retacrit [id number] U x 1 on 3/7  Severe AS-TAVR deferred for now once active GI bleed, anemia, and EVAN have resolve   EVAN over CKD 4 -possible CRS vs AIN. Started on po steroids. Get UA and urine pro/cr, UA for eosinophils   Acute HFpEF / severe AS. Possibly euvolemic now. Switched to Torsemide 20 mg . Lasix discontinued  Continue current medical management for active chronic comorbid conditions.  Protonic switched to PO  DVT Prophylaxis as appropriate  Supportive care including activity, diet, goals of care Attending Attestation:  Patient was seen & examined independently. At least 10 systems were reviewed in ROS. All systems reviewed  are within normal limits. Latest vital signs and labs were reviewed today. Case was discussed with house staff in morning rounds for assessment and plan.  Patient is medically stable for discharge . About 32 mins spent on discharge disposition.ussed in AM rounds.  Discussed with  / in AM rounds  Handoff: Disposition: [facility] when stable, not stable for discharge yet in lieu of EVAN, volume optimization. Anticipated for tomorrow

## 2022-03-14 LAB
ALBUMIN SERPL ELPH-MCNC: 3.5 G/DL — SIGNIFICANT CHANGE UP (ref 3.5–5.2)
ALP SERPL-CCNC: 53 U/L — SIGNIFICANT CHANGE UP (ref 30–115)
ALT FLD-CCNC: 11 U/L — SIGNIFICANT CHANGE UP (ref 0–41)
ANION GAP SERPL CALC-SCNC: 12 MMOL/L — SIGNIFICANT CHANGE UP (ref 7–14)
AST SERPL-CCNC: 15 U/L — SIGNIFICANT CHANGE UP (ref 0–41)
BASOPHILS # BLD AUTO: 0.02 K/UL — SIGNIFICANT CHANGE UP (ref 0–0.2)
BASOPHILS NFR BLD AUTO: 0.3 % — SIGNIFICANT CHANGE UP (ref 0–1)
BILIRUB SERPL-MCNC: 0.7 MG/DL — SIGNIFICANT CHANGE UP (ref 0.2–1.2)
BUN SERPL-MCNC: 79 MG/DL — CRITICAL HIGH (ref 10–20)
CALCIUM SERPL-MCNC: 8.9 MG/DL — SIGNIFICANT CHANGE UP (ref 8.5–10.1)
CHLORIDE SERPL-SCNC: 108 MMOL/L — SIGNIFICANT CHANGE UP (ref 98–110)
CO2 SERPL-SCNC: 27 MMOL/L — SIGNIFICANT CHANGE UP (ref 17–32)
CREAT SERPL-MCNC: 3 MG/DL — HIGH (ref 0.7–1.5)
EGFR: 16 ML/MIN/1.73M2 — LOW
EOSINOPHIL # BLD AUTO: 0.59 K/UL — SIGNIFICANT CHANGE UP (ref 0–0.7)
EOSINOPHIL NFR BLD AUTO: 9.6 % — HIGH (ref 0–8)
GLUCOSE SERPL-MCNC: 93 MG/DL — SIGNIFICANT CHANGE UP (ref 70–99)
HCT VFR BLD CALC: 27.4 % — LOW (ref 37–47)
HGB BLD-MCNC: 8.2 G/DL — LOW (ref 12–16)
IMM GRANULOCYTES NFR BLD AUTO: 1 % — HIGH (ref 0.1–0.3)
LYMPHOCYTES # BLD AUTO: 0.61 K/UL — LOW (ref 1.2–3.4)
LYMPHOCYTES # BLD AUTO: 9.9 % — LOW (ref 20.5–51.1)
MAGNESIUM SERPL-MCNC: 2.1 MG/DL — SIGNIFICANT CHANGE UP (ref 1.8–2.4)
MCHC RBC-ENTMCNC: 28.3 PG — SIGNIFICANT CHANGE UP (ref 27–31)
MCHC RBC-ENTMCNC: 29.9 G/DL — LOW (ref 32–37)
MCV RBC AUTO: 94.5 FL — SIGNIFICANT CHANGE UP (ref 81–99)
MONOCYTES # BLD AUTO: 0.54 K/UL — SIGNIFICANT CHANGE UP (ref 0.1–0.6)
MONOCYTES NFR BLD AUTO: 8.8 % — SIGNIFICANT CHANGE UP (ref 1.7–9.3)
NEUTROPHILS # BLD AUTO: 4.33 K/UL — SIGNIFICANT CHANGE UP (ref 1.4–6.5)
NEUTROPHILS NFR BLD AUTO: 70.4 % — SIGNIFICANT CHANGE UP (ref 42.2–75.2)
NRBC # BLD: 0 /100 WBCS — SIGNIFICANT CHANGE UP (ref 0–0)
PLATELET # BLD AUTO: 132 K/UL — SIGNIFICANT CHANGE UP (ref 130–400)
POTASSIUM SERPL-MCNC: 4.7 MMOL/L — SIGNIFICANT CHANGE UP (ref 3.5–5)
POTASSIUM SERPL-SCNC: 4.7 MMOL/L — SIGNIFICANT CHANGE UP (ref 3.5–5)
PROT SERPL-MCNC: 5.8 G/DL — LOW (ref 6–8)
RBC # BLD: 2.9 M/UL — LOW (ref 4.2–5.4)
RBC # FLD: 20.1 % — HIGH (ref 11.5–14.5)
SARS-COV-2 RNA SPEC QL NAA+PROBE: SIGNIFICANT CHANGE UP
SODIUM SERPL-SCNC: 147 MMOL/L — HIGH (ref 135–146)
WBC # BLD: 6.15 K/UL — SIGNIFICANT CHANGE UP (ref 4.8–10.8)
WBC # FLD AUTO: 6.15 K/UL — SIGNIFICANT CHANGE UP (ref 4.8–10.8)

## 2022-03-14 PROCEDURE — 99233 SBSQ HOSP IP/OBS HIGH 50: CPT

## 2022-03-14 RX ADMIN — Medication 25 MILLIGRAM(S): at 05:37

## 2022-03-14 RX ADMIN — PANTOPRAZOLE SODIUM 40 MILLIGRAM(S): 20 TABLET, DELAYED RELEASE ORAL at 17:15

## 2022-03-14 RX ADMIN — Medication 50 MILLIGRAM(S): at 05:37

## 2022-03-14 RX ADMIN — Medication 25 MILLIGRAM(S): at 21:40

## 2022-03-14 RX ADMIN — Medication 25 MILLIGRAM(S): at 17:15

## 2022-03-14 RX ADMIN — Medication 20 MILLIGRAM(S): at 05:38

## 2022-03-14 RX ADMIN — CHLORHEXIDINE GLUCONATE 1 APPLICATION(S): 213 SOLUTION TOPICAL at 06:36

## 2022-03-14 RX ADMIN — PANTOPRAZOLE SODIUM 40 MILLIGRAM(S): 20 TABLET, DELAYED RELEASE ORAL at 05:37

## 2022-03-14 NOTE — PROGRESS NOTE ADULT - SUBJECTIVE AND OBJECTIVE BOX
Nephrology progress note    THIS IS AN INCOMPLETE NOTE . FULL NOTE TO FOLLOW SHORTLY    Patient is seen and examined, events over the last 24 h noted .    Allergies:  aspirin (Rash)  latex (Unknown)  penicillins (Unknown)    Hospital Medications:   MEDICATIONS  (STANDING):  chlorhexidine 4% Liquid 1 Application(s) Topical <User Schedule>  metoprolol tartrate 25 milliGRAM(s) Oral two times a day  pantoprazole    Tablet 40 milliGRAM(s) Oral two times a day  predniSONE   Tablet 50 milliGRAM(s) Oral daily  torsemide 20 milliGRAM(s) Oral daily        VITALS:  T(F): 96.2 (22 @ 07:40), Max: 98 (22 @ 16:21)  HR: 60 (22 @ 07:40)  BP: 125/55 (22 @ 07:40)  RR: 18 (22 @ 07:40)  SpO2: 95% (22 @ 00:00)  Wt(kg): --     @ 06:01  -   @ 07:00  --------------------------------------------------------  IN: 0 mL / OUT: 1500 mL / NET: -1500 mL     @ 07:01  -   @ 07:00  --------------------------------------------------------  IN: 600 mL / OUT: 440 mL / NET: 160 mL          PHYSICAL EXAM:  Constitutional: NAD  HEENT: anicteric sclera, oropharynx clear, MMM  Neck: No JVD  Respiratory: CTAB, no wheezes, rales or rhonchi  Cardiovascular: S1, S2, RRR  Gastrointestinal: BS+, soft, NT/ND  Extremities: No cyanosis or clubbing. No peripheral edema  :  No culver.   Skin: No rashes    LABS:      147<H>  |  108  |  79<HH>  ----------------------------<  93  4.7   |  27  |  3.0<H>  Creatinine Trend: 3.0<--, 2.9<--, 3.0<--, 2.9<--, 3.1<--, 3.3<--  Ca    8.9      14 Mar 2022 04:30  Mg     2.1         TPro  5.8<L>  /  Alb  3.5  /  TBili  0.7  /  DBili      /  AST  15  /  ALT  11  /  AlkPhos  53                            8.2    6.15  )-----------( 132      ( 14 Mar 2022 04:30 )             27.4       Urine Studies:  Urinalysis Basic - ( 10 Mar 2022 18:13 )    Color: Light Yellow / Appearance: Clear / S.013 / pH:   Gluc:  / Ketone: Negative  / Bili: Negative / Urobili: <2 mg/dL   Blood:  / Protein: Negative / Nitrite: Negative   Leuk Esterase: Negative / RBC:  / WBC    Sq Epi:  / Non Sq Epi:  / Bacteria:       Sodium, Random Urine: 30.0 mmoL/L ( @ 23:50)  Creatinine, Random Urine: 128 mg/dL ( @ 23:50)      Iron 43, TIBC 294, %sat 15      [22 @ 12:22]  Ferritin 41      [22 @ 12:22]  Lipid: chol 93, , HDL 38, LDL --      [22 @ 06:03]      Free Light Chains: kappa 11.98, lambda 7.77, ratio = 1.54      [ @ 05:43]  Immunofixation Serum:   Weak IgG Lambda Band Identified    Reference Range: None Detected      [22 @ 05:43]  SPEP Interpretation: Weak Gamma-Migrating Paraprotein Identified      [22 @ 05:43]      RADIOLOGY & ADDITIONAL STUDIES:   Nephrology progress note  Patient is seen and examined, events over the last 24 h noted .  No complaints   No events     Allergies:  aspirin (Rash)  latex (Unknown)  penicillins (Unknown)    Hospital Medications:   MEDICATIONS  (STANDING):    metoprolol tartrate 25 milliGRAM(s) Oral two times a day  pantoprazole    Tablet 40 milliGRAM(s) Oral two times a day  predniSONE   Tablet 50 milliGRAM(s) Oral daily  torsemide 20 milliGRAM(s) Oral daily        VITALS:  T(F): 96.2 (22 @ 07:40), Max: 98 (22 @ 16:21)  HR: 60 (22 @ 07:40)  BP: 125/55 (22 @ 07:40)  RR: 18 (22 @ 07:40)  SpO2: 95% (22 @ 00:00)       @ 06:01  -   @ 07:00  --------------------------------------------------------  IN: 0 mL / OUT: 1500 mL / NET: -1500 mL     @ 07:01  -   @ 07:00  --------------------------------------------------------  IN: 600 mL / OUT: 440 mL / NET: 160 mL          PHYSICAL EXAM:  Constitutional: NAD  Neck: No JVD  Respiratory: CTAB, no wheezes, rales or rhonchi  Cardiovascular: S1, S2, RRR  Gastrointestinal: BS+, soft, NT/ND  Extremities: No cyanosis or clubbing. No peripheral edema  :  No culver.   Skin: No rashes    LABS:      147<H>  |  108  |  79<HH>  ----------------------------<  93  4.7   |  27  |  3.0<H>    Creatinine Trend: 3.0<--, 2.9<--, 3.0<--, 2.9<--, 3.1<--, 3.3<--    Ca    8.9      14 Mar 2022 04:30  Mg     2.1     -    TPro  5.8<L>  /  Alb  3.5  /  TBili  0.7  /  DBili      /  AST  15  /  ALT  11  /  AlkPhos  53  -                          8.2    6.15  )-----------( 132      ( 14 Mar 2022 04:30 )             27.4       Urine Studies:  Urinalysis Basic - ( 10 Mar 2022 18:13 )    Color: Light Yellow / Appearance: Clear / S.013 / pH:   Gluc:  / Ketone: Negative  / Bili: Negative / Urobili: <2 mg/dL   Blood:  / Protein: Negative / Nitrite: Negative   Leuk Esterase: Negative / RBC:  / WBC    Sq Epi:  / Non Sq Epi:  / Bacteria:       Sodium, Random Urine: 30.0 mmoL/L ( @ 23:50)  Creatinine, Random Urine: 128 mg/dL ( @ 23:50)      Iron 43, TIBC 294, %sat 15      [22 @ 12:22]  Ferritin 41      [22 @ 12:22]  Lipid: chol 93, , HDL 38, LDL --      [22 @ 06:03]      Free Light Chains: kappa 11.98, lambda 7.77, ratio = 1.54      [ @ 05:43]  Immunofixation Serum:   Weak IgG Lambda Band Identified    Reference Range: None Detected      [22 @ 05:43]  SPEP Interpretation: Weak Gamma-Migrating Paraprotein Identified      [22 @ 05:43]      RADIOLOGY & ADDITIONAL STUDIES:   no

## 2022-03-14 NOTE — PROGRESS NOTE ADULT - SUBJECTIVE AND OBJECTIVE BOX
Progress Note:  Provider Speciality                            Hospitalist      LATRICIA ARREAGA MRN-[id number] 76y Female     CHIEF PRESENTING COMPLAINT:  Patient is a 76y old  Female who presents with a chief complaint of Low Hb (11 Mar 2022 14:48)        SUBJECTIVE:  Patient was seen and examined at bedside. Reports improvement in  presenting complaint.   No significant overnight events reported.     HISTORY OF PRESENTING ILLNESS:  HPI:  Location: [facility] ER Hold 020 A ([facility] ER Hold)  Patient Name: LATRICIA ARREAGA  Age: 76y  Gender: Female      History of Present Illness    Ms. Arreaga is a 76 year old (ex smoker) female patient known to have:  - Severe AS. Last TTE 02/17/22 EF 74%, severe AS with A 0.8cm2 and mean P 48.9,  mild MR  - Anemia Likely Secondary to obscure GI bleeding (from Duodenum and Gastric AVM per recent capsule endoscopy 02/2022 after presenting with melena), Fe Deficiency (TFN S% 5 in 2020 and ferritin 28 in 2022), and CKD per Dr Silver. Received Transfusions q1-2 weeks and Retacrit 46997 units weekly  - Moderate Diverticulosis and Grade II Internal Hemorrhoids on colonoscopy 10/22/2019  - Erosive Gastritis on EGD 01/09/2020  - Hypertension  - CKD. Baseline Cr 1.7-2.2 2022  - Sciatica      She presented to the ED on 03/04 for evaluation of low Hb 4.5 on outpatient labs.  History goes back to yesterday when the patient had labs at [facility].  Hb came back as 4.5 today so Dr Silver asked her to come to ED.  Patient reports episodes of substernal chest discomfort and shortness of breath on minimal exertion associated with worsening leg swelling, orthopneas, and PNDs.  She denies light headedness, palpitations, syncope, falls, or diaphoresis.  She also denies any hematemesis, melena, hematochezia, or hemoptysis. Passing dark brown bowel movements almost on a daily basis (last time admitted with back stools was in 02/2022)      On review of systems, patient denies any recent fever, chills, night sweats, URTI symptoms (cough, rhinorrhea, sore throat), urinary symptoms (urinary frequency, urgency, intermittence, dysuria, foul smelling urine, cloudy urine), abdominal pain, headache, nausea, or vomiting.   No sick contacts.   No recent travel or exposure to recent travelers.      Upon presentation to the ED, the patient was hemodynamically stable:  Vital Signs in ED   - /44 mmHg  - HR 74 bpm  - RR 20 bpm  - T 97 degrees  - SaO2 99% on RA    Investigations   Laboratory Workup  - CBC:                        4.5    4.54  )-----------( 134      ( 04 Mar 2022 12:30 )             15.5     - Chemistry:  03-04    139  |  106  |  60<H>  ----------------------------<  94  5.3<H>   |  25  |  2.7<H>    Ca    8.3<L>      04 Mar 2022 12:30    TPro  5.7<L>  /  Alb  3.3<L>  /  TBili  0.6  /  DBili  x   /  AST  13  /  ALT  6   /  AlkPhos  66  03-04    - Coagulation Studies:  PT/INR - ( 04 Mar 2022 12:30 )   PT: 11.70 sec;   INR: 1.02 ratio    PTT - ( 04 Mar 2022 12:30 )  PTT:32.0 sec      Microbiological Workup      Radiological Workup  * CXR with Bilateral perihilar and lower lobe opacities and effusions, left greater than right.      - GI team evaluated patient in the ED  - OLEKSANDR with brown stool, no bright red blood  - 3 units of packed RBCs were ordered for patient  - Stat IV lasix 40mg x1 dose was administered (not more since BP on low side and patient has severe AS)  - Patient will be admitted for further investigations, management, and monitoring           (04 Mar 2022 15:57)        REVIEW OF SYSTEMS:  Patient denies any headache, any vision complaints, runny nose, fever, chills. Denies chest pain, shortness of breath, palpitation. Denies nausea, vomiting, abdominal pain or diarrhoea, Denies dysuria. Denies  localized weakness in any part of the body or numbness.   At least 10 systems were reviewed in ROS. All systems reviewed  are within normal limits except for the complaints as described in Subjective.    PAST MEDICAL & SURGICAL HISTORY:  PAST MEDICAL & SURGICAL HISTORY:  HTN (hypertension)    Heart murmur    Eczema    Anemia    Aortic stenosis    H/O appendicitis    H/O cholecystitis            VITAL SIGNS:  Vital Signs Last 24 Hrs  T(C): 35.7 (14 Mar 2022 07:40), Max: 36.7 (13 Mar 2022 16:21)  T(F): 96.2 (14 Mar 2022 07:40), Max: 98 (13 Mar 2022 16:21)  HR: 60 (14 Mar 2022 07:40) (60 - 68)  BP: 125/55 (14 Mar 2022 07:40) (125/55 - 149/54)  BP(mean): --  RR: 18 (14 Mar 2022 07:40) (18 - 18)  SpO2: 95% (14 Mar 2022 00:00) (95% - 95%)      PHYSICAL EXAMINATION:  Not in acute distress, obese  General: No icterus  HEENT:   no JVD.  Heart: S1+S2 audible  Lungs: bilateral  moderate air entry, no wheezing, no crepitations.  Abdomen: Soft, non-tender, non-distended , no  rigidity or guarding.  CNS: Awake alert, CN  grossly intact.  Extremities:   edema    LE improved        CONSULTS:  Consultant(s) Notes Reviewed by me.   Care Discussed with Consultants/Other Providers where required.        MEDICATIONS:  MEDICATIONS  (STANDING):  chlorhexidine 4% Liquid 1 Application(s) Topical <User Schedule>  metoprolol tartrate 25 milliGRAM(s) Oral two times a day  pantoprazole    Tablet 40 milliGRAM(s) Oral two times a day  predniSONE   Tablet 50 milliGRAM(s) Oral daily    MEDICATIONS  (PRN):  acetaminophen     Tablet .. 650 milliGRAM(s) Oral every 6 hours PRN Mild Pain (1 - 3)  aluminum hydroxide/magnesium hydroxide/simethicone Suspension 30 milliLiter(s) Oral every 4 hours PRN Dyspepsia  diphenhydrAMINE 25 milliGRAM(s) Oral every 4 hours PRN Rash and/or Itching  ondansetron Injectable 4 milliGRAM(s) IV Push every 8 hours PRN Nausea and/or Vomiting            ASSESSMENT:      77 y/o woman with PMH of severe AS, HTN, CKD 4, sciatica and anemia recently found to have evidence of bleeding from gastric AVM and duodenum on capsule endoscopy and currently on q1-2 weekly transfusions and retacrit weekly now presented to the ED on 03/04 for evaluation of low hemoglobin on outpatient labs.       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 H/H currently stable  GI-As per family wishes holding off on scope unless clinical deterioration  Possible DARRIN- Venofer x 3 days, Received retacrit [id number] U x 1 on 3/7  Severe AS-TAVR deferred for now once active GI bleed, anemia, and EVAN have resolve   EVAN over CKD 4 -possible CRS vs AIN. Started on po steroids. Get UA and urine pro/cr, UA for eosinophils   Acute HFpEF / severe AS. Possibly euvolemic now. Switched to Torsemide 20 mg . Lasix discontinued  Continue current medical management for active chronic comorbid conditions.  Protonic switched to PO  DVT Prophylaxis as appropriate  Supportive care including activity, diet, goals of care Attending Attestation:  Patient was seen & examined independently. At least 10 systems were reviewed in ROS. All systems reviewed  are within normal limits. Latest vital signs and labs were reviewed today. Case was discussed with house staff in morning rounds for assessment and plan.  Patient is medically stable for discharge . About 32 mins spent on discharge disposition.ussed in AM rounds.  Discussed with  / in AM rounds  Handoff: Disposition: . Anticipated for  discharge to [facility] today

## 2022-03-14 NOTE — PROGRESS NOTE ADULT - ASSESSMENT
76F PMHx of CKD 4 (baseline Cr 1.7-2.2 from 2022), HFpEF EF 74%, Severe AS, chronic anemia likely due to GI bleed per capsule endoscopy (2/2022) showing duodenum and gastric AVM (outpatient pt receives Procrit 20,000 units every other week, and transfusions every week), erosive gastritis, HTN presenting from NH for acute on chronic anemia a/w chest pain, SOB, worsening LE swelling and orthopnea, having dark stool.      # EVAN on CKD 4 - due to CRS vs AIN - new skin rash on torso and arm   baseline creat around 2   started on prednisone continue for now   non oliguric   creat stable- slightly better    UA neg blood/prot U eos neg   Kidney sono no hydro  phos level at goal, no need for binder  on torsemide continue   strict i/o  GI bleed, s/p blood tx , follow h/h, on venofer   # AS - will need a CTA / hold diuretics one day prior to decrease risk of MAX if proceeding and imaging urgently needed   will follow

## 2022-03-14 NOTE — CHART NOTE - NSCHARTNOTEFT_GEN_A_CORE
Patient was seen for LOS. She reports good appetite and po intake >75% of meals. During hospital admission, patient is on bite sized texture due to not having dentures at home. She reports tolerating soft and bite sized texture well. At home when she has regular texture foods, she is able to eat them with no problem with dentures. RD educated patient on DASH/TLC diet and reading labels, as well as fluid restriction. Patient verbalized understanding. RD to provide written material before patient's planned discharge later today.

## 2022-03-15 LAB
ALBUMIN SERPL ELPH-MCNC: 3.6 G/DL — SIGNIFICANT CHANGE UP (ref 3.5–5.2)
ALP SERPL-CCNC: 54 U/L — SIGNIFICANT CHANGE UP (ref 30–115)
ALT FLD-CCNC: 13 U/L — SIGNIFICANT CHANGE UP (ref 0–41)
ANION GAP SERPL CALC-SCNC: 15 MMOL/L — HIGH (ref 7–14)
AST SERPL-CCNC: 14 U/L — SIGNIFICANT CHANGE UP (ref 0–41)
BASOPHILS # BLD AUTO: 0.02 K/UL — SIGNIFICANT CHANGE UP (ref 0–0.2)
BASOPHILS NFR BLD AUTO: 0.4 % — SIGNIFICANT CHANGE UP (ref 0–1)
BILIRUB SERPL-MCNC: 0.7 MG/DL — SIGNIFICANT CHANGE UP (ref 0.2–1.2)
BUN SERPL-MCNC: 78 MG/DL — CRITICAL HIGH (ref 10–20)
CALCIUM SERPL-MCNC: 8.5 MG/DL — SIGNIFICANT CHANGE UP (ref 8.5–10.1)
CHLORIDE SERPL-SCNC: 111 MMOL/L — HIGH (ref 98–110)
CO2 SERPL-SCNC: 25 MMOL/L — SIGNIFICANT CHANGE UP (ref 17–32)
CREAT SERPL-MCNC: 3 MG/DL — HIGH (ref 0.7–1.5)
EGFR: 16 ML/MIN/1.73M2 — LOW
EOSINOPHIL # BLD AUTO: 0.55 K/UL — SIGNIFICANT CHANGE UP (ref 0–0.7)
EOSINOPHIL NFR BLD AUTO: 10 % — HIGH (ref 0–8)
GLUCOSE SERPL-MCNC: 78 MG/DL — SIGNIFICANT CHANGE UP (ref 70–99)
HCT VFR BLD CALC: 27.9 % — LOW (ref 37–47)
HGB BLD-MCNC: 8.2 G/DL — LOW (ref 12–16)
IMM GRANULOCYTES NFR BLD AUTO: 1.1 % — HIGH (ref 0.1–0.3)
LYMPHOCYTES # BLD AUTO: 1.06 K/UL — LOW (ref 1.2–3.4)
LYMPHOCYTES # BLD AUTO: 19.3 % — LOW (ref 20.5–51.1)
MAGNESIUM SERPL-MCNC: 2 MG/DL — SIGNIFICANT CHANGE UP (ref 1.8–2.4)
MCHC RBC-ENTMCNC: 28.3 PG — SIGNIFICANT CHANGE UP (ref 27–31)
MCHC RBC-ENTMCNC: 29.4 G/DL — LOW (ref 32–37)
MCV RBC AUTO: 96.2 FL — SIGNIFICANT CHANGE UP (ref 81–99)
MONOCYTES # BLD AUTO: 0.57 K/UL — SIGNIFICANT CHANGE UP (ref 0.1–0.6)
MONOCYTES NFR BLD AUTO: 10.4 % — HIGH (ref 1.7–9.3)
NEUTROPHILS # BLD AUTO: 3.22 K/UL — SIGNIFICANT CHANGE UP (ref 1.4–6.5)
NEUTROPHILS NFR BLD AUTO: 58.8 % — SIGNIFICANT CHANGE UP (ref 42.2–75.2)
NRBC # BLD: 0 /100 WBCS — SIGNIFICANT CHANGE UP (ref 0–0)
PLATELET # BLD AUTO: 112 K/UL — LOW (ref 130–400)
POTASSIUM SERPL-MCNC: 4.7 MMOL/L — SIGNIFICANT CHANGE UP (ref 3.5–5)
POTASSIUM SERPL-SCNC: 4.7 MMOL/L — SIGNIFICANT CHANGE UP (ref 3.5–5)
PROT SERPL-MCNC: 5.4 G/DL — LOW (ref 6–8)
RBC # BLD: 2.9 M/UL — LOW (ref 4.2–5.4)
RBC # FLD: 19.9 % — HIGH (ref 11.5–14.5)
SODIUM SERPL-SCNC: 151 MMOL/L — HIGH (ref 135–146)
WBC # BLD: 5.48 K/UL — SIGNIFICANT CHANGE UP (ref 4.8–10.8)
WBC # FLD AUTO: 5.48 K/UL — SIGNIFICANT CHANGE UP (ref 4.8–10.8)

## 2022-03-15 PROCEDURE — 99233 SBSQ HOSP IP/OBS HIGH 50: CPT

## 2022-03-15 RX ADMIN — CHLORHEXIDINE GLUCONATE 1 APPLICATION(S): 213 SOLUTION TOPICAL at 05:47

## 2022-03-15 RX ADMIN — Medication 25 MILLIGRAM(S): at 05:47

## 2022-03-15 RX ADMIN — Medication 25 MILLIGRAM(S): at 17:55

## 2022-03-15 RX ADMIN — Medication 25 MILLIGRAM(S): at 22:39

## 2022-03-15 RX ADMIN — Medication 50 MILLIGRAM(S): at 05:46

## 2022-03-15 RX ADMIN — Medication 20 MILLIGRAM(S): at 05:46

## 2022-03-15 RX ADMIN — PANTOPRAZOLE SODIUM 40 MILLIGRAM(S): 20 TABLET, DELAYED RELEASE ORAL at 05:46

## 2022-03-15 RX ADMIN — PANTOPRAZOLE SODIUM 40 MILLIGRAM(S): 20 TABLET, DELAYED RELEASE ORAL at 17:55

## 2022-03-15 NOTE — PROGRESS NOTE ADULT - ASSESSMENT
76F PMHx of CKD 4 (baseline Cr 1.7-2.2 from 2022), HFpEF EF 74%, Severe AS, chronic anemia likely due to GI bleed per capsule endoscopy (2/2022) showing duodenum and gastric AVM (outpatient pt receives Procrit 20,000 units every other week, and transfusions every week), erosive gastritis, HTN presenting from NH for acute on chronic anemia a/w chest pain, SOB, worsening LE swelling and orthopnea, having dark stool.      # EVAN on CKD 4 - due to CRS vs AIN - new skin rash on torso and arm   baseline creat around 2   started on prednisone continue for now   non oliguric   creat stable  UA neg blood/prot U eos neg   Kidney sono no hydro  strict i/o  on torsemide continue     #Anemia  - GI bleed, s/p blood tx , follow h/h, on venofer     # AS - will need a CTA / hold diuretics one day prior to decrease risk of MAX if proceeding and imaging urgently needed           #MBD  - phos level at goal, no need for binder

## 2022-03-15 NOTE — PROGRESS NOTE ADULT - SUBJECTIVE AND OBJECTIVE BOX
LATRICIA ARREAGA 76y Female  MRN#: [id number]     Hospital Day: 11d    Pt is currently admitted with the primary diagnosis of anemia    SUBJECTIVE    No Overnight events     No Subjective complaints     patient seen and examined    Present Today:   - Maite:  No                                            ----------------------------------------------------------  OBJECTIVE  PAST MEDICAL & SURGICAL HISTORY  HTN (hypertension)    Heart murmur    Eczema    Anemia    Aortic stenosis    H/O appendicitis    H/O cholecystitis                                              -----------------------------------------------------------  ALLERGIES:  aspirin (Rash)  latex (Unknown)  penicillins (Unknown)                                            ------------------------------------------------------------    HOME MEDICATIONS  Home Medications:  aluminum hydroxide-magnesium hydroxide 200 mg-200 mg/5 mL oral suspension: 30 milliliter(s) orally every 4 hours, As needed, Dyspepsia (24 Jan 2022 12:15)  diphenhydrAMINE 25 mg oral capsule: 1 cap(s) orally every 4 hours, As needed, Rash and/or Itching (13 Mar 2022 00:13)  melatonin 3 mg oral tablet: 1 tab(s) orally once a day (at bedtime), As needed, Insomnia (24 Jan 2022 12:15)  Metoprolol Tartrate 25 mg oral tablet: 1 tab(s) orally 2 times a day (18 Jan 2022 00:11)  pantoprazole 40 mg oral delayed release tablet: 1 tab(s) orally 2 times a day (13 Mar 2022 00:13)                           MEDICATIONS:  STANDING MEDICATIONS  chlorhexidine 4% Liquid 1 Application(s) Topical <User Schedule>  metoprolol tartrate 25 milliGRAM(s) Oral two times a day  pantoprazole    Tablet 40 milliGRAM(s) Oral two times a day  torsemide 20 milliGRAM(s) Oral daily    PRN MEDICATIONS  acetaminophen     Tablet .. 650 milliGRAM(s) Oral every 6 hours PRN  aluminum hydroxide/magnesium hydroxide/simethicone Suspension 30 milliLiter(s) Oral every 4 hours PRN  diphenhydrAMINE 25 milliGRAM(s) Oral every 4 hours PRN  ondansetron Injectable 4 milliGRAM(s) IV Push every 8 hours PRN                                            ------------------------------------------------------------  VITAL SIGNS: Last 24 Hours  T(C): 36.2 (15 Mar 2022 07:27), Max: 36.2 (15 Mar 2022 07:27)  T(F): 97.1 (15 Mar 2022 07:27), Max: 97.1 (15 Mar 2022 07:27)  HR: 52 (15 Mar 2022 07:27) (52 - 66)  BP: 118/56 (15 Mar 2022 07:27) (118/56 - 150/65)  BP(mean): --  RR: 18 (15 Mar 2022 07:27) (18 - 18)  SpO2: 95% (15 Mar 2022 07:27) (95% - 95%)      03-14-22 @ 07:01  -  03-15-22 @ 07:00  --------------------------------------------------------  IN: 220 mL / OUT: 900 mL / NET: -680 mL    03-15-22 @ 07:01  -  03-15-22 @ 14:32  --------------------------------------------------------  IN: 420 mL / OUT: 600 mL / NET: -180 mL                                             --------------------------------------------------------------  LABS:                        8.2    5.48  )-----------( 112      ( 15 Mar 2022 07:10 )             27.9     03-15    151<H>  |  111<H>  |  78<HH>  ----------------------------<  78  4.7   |  25  |  3.0<H>    Ca    8.5      15 Mar 2022 07:10  Mg     2.0     03-15    TPro  5.4<L>  /  Alb  3.6  /  TBili  0.7  /  DBili  x   /  AST  14  /  ALT  13  /  AlkPhos  54  03-15                                                              -------------------------------------------------------------  RADIOLOGY:                                            --------------------------------------------------------------    PHYSICAL EXAM:    GENERAL: in NAD  HEENT: atraumatic  Cardiac: 3/6 systolic murmur  Respiratory: decreased bilateral breath sounds (L>R)  Abdomen: no tenderness to palpation. bowel sounds present  Extremities: bilateral 1+ pitting edema (R>L), chronic  Skin: no rashes or lesions

## 2022-03-15 NOTE — PROGRESS NOTE ADULT - SUBJECTIVE AND OBJECTIVE BOX
Nephrology progress note    Patient was seen and examined, events over the last 24 h noted .    Allergies:  aspirin (Rash)  latex (Unknown)  penicillins (Unknown)    Hospital Medications:   MEDICATIONS  (STANDING):  chlorhexidine 4% Liquid 1 Application(s) Topical <User Schedule>  metoprolol tartrate 25 milliGRAM(s) Oral two times a day  pantoprazole    Tablet 40 milliGRAM(s) Oral two times a day  torsemide 20 milliGRAM(s) Oral daily        VITALS:  T(F): 97.1 (03-15-22 @ 07:27), Max: 97.1 (03-15-22 @ 07:27)  HR: 70 (03-15-22 @ 15:54)  BP: 129/59 (03-15-22 @ 15:54)  RR: 18 (03-15-22 @ 07:27)  SpO2: 96% (03-15-22 @ 15:54)  Wt(kg): --     @ 07:01  -   @ 07:00  --------------------------------------------------------  IN: 600 mL / OUT: 440 mL / NET: 160 mL     @ 07:01  -  03-15 @ 07:00  --------------------------------------------------------  IN: 220 mL / OUT: 900 mL / NET: -680 mL    03-15 @ 07:01  -  03-15 @ 16:55  --------------------------------------------------------  IN: 420 mL / OUT: 600 mL / NET: -180 mL          PHYSICAL EXAM:  Constitutional: NAD  HEENT: anicteric sclera, oropharynx clear, MMM  Neck: No JVD  Respiratory: CTAB, no wheezes, rales or rhonchi  Cardiovascular: S1, S2, RRR  Gastrointestinal: BS+, soft, NT/ND  Extremities: No cyanosis or clubbing. No peripheral edema  :  No culver.   Skin: No rashes    LABS:  03-15    151<H>  |  111<H>  |  78<HH>  ----------------------------<  78  4.7   |  25  |  3.0<H>    Ca    8.5      15 Mar 2022 07:10  Mg     2.0     03-15    TPro  5.4<L>  /  Alb  3.6  /  TBili  0.7  /  DBili      /  AST  14  /  ALT  13  /  AlkPhos  54  03-15                          8.2    5.48  )-----------( 112      ( 15 Mar 2022 07:10 )             27.9       Urine Studies:  Urinalysis Basic - ( 10 Mar 2022 18:13 )    Color: Light Yellow / Appearance: Clear / S.013 / pH:   Gluc:  / Ketone: Negative  / Bili: Negative / Urobili: <2 mg/dL   Blood:  / Protein: Negative / Nitrite: Negative   Leuk Esterase: Negative / RBC:  / WBC    Sq Epi:  / Non Sq Epi:  / Bacteria:       Sodium, Random Urine: 30.0 mmoL/L ( @ 23:50)  Creatinine, Random Urine: 128 mg/dL ( @ 23:50)    RADIOLOGY & ADDITIONAL STUDIES:

## 2022-03-16 ENCOUNTER — TRANSCRIPTION ENCOUNTER (OUTPATIENT)
Age: 77
End: 2022-03-16

## 2022-03-16 VITALS
SYSTOLIC BLOOD PRESSURE: 132 MMHG | RESPIRATION RATE: 18 BRPM | DIASTOLIC BLOOD PRESSURE: 61 MMHG | HEART RATE: 63 BPM | TEMPERATURE: 96 F

## 2022-03-16 LAB
ALBUMIN SERPL ELPH-MCNC: 3.7 G/DL — SIGNIFICANT CHANGE UP (ref 3.5–5.2)
ALP SERPL-CCNC: 55 U/L — SIGNIFICANT CHANGE UP (ref 30–115)
ALT FLD-CCNC: 15 U/L — SIGNIFICANT CHANGE UP (ref 0–41)
ANION GAP SERPL CALC-SCNC: 14 MMOL/L — SIGNIFICANT CHANGE UP (ref 7–14)
AST SERPL-CCNC: 17 U/L — SIGNIFICANT CHANGE UP (ref 0–41)
BASOPHILS # BLD AUTO: 0.02 K/UL — SIGNIFICANT CHANGE UP (ref 0–0.2)
BASOPHILS NFR BLD AUTO: 0.3 % — SIGNIFICANT CHANGE UP (ref 0–1)
BILIRUB SERPL-MCNC: 1 MG/DL — SIGNIFICANT CHANGE UP (ref 0.2–1.2)
BUN SERPL-MCNC: 85 MG/DL — CRITICAL HIGH (ref 10–20)
CALCIUM SERPL-MCNC: 8.6 MG/DL — SIGNIFICANT CHANGE UP (ref 8.5–10.1)
CHLORIDE SERPL-SCNC: 107 MMOL/L — SIGNIFICANT CHANGE UP (ref 98–110)
CO2 SERPL-SCNC: 25 MMOL/L — SIGNIFICANT CHANGE UP (ref 17–32)
CREAT SERPL-MCNC: 3 MG/DL — HIGH (ref 0.7–1.5)
EGFR: 16 ML/MIN/1.73M2 — LOW
EOSINOPHIL # BLD AUTO: 0.75 K/UL — HIGH (ref 0–0.7)
EOSINOPHIL NFR BLD AUTO: 10.8 % — HIGH (ref 0–8)
GLUCOSE SERPL-MCNC: 88 MG/DL — SIGNIFICANT CHANGE UP (ref 70–99)
HCT VFR BLD CALC: 29.3 % — LOW (ref 37–47)
HGB BLD-MCNC: 8.9 G/DL — LOW (ref 12–16)
IMM GRANULOCYTES NFR BLD AUTO: 0.7 % — HIGH (ref 0.1–0.3)
LYMPHOCYTES # BLD AUTO: 0.92 K/UL — LOW (ref 1.2–3.4)
LYMPHOCYTES # BLD AUTO: 13.2 % — LOW (ref 20.5–51.1)
MAGNESIUM SERPL-MCNC: 2.1 MG/DL — SIGNIFICANT CHANGE UP (ref 1.8–2.4)
MCHC RBC-ENTMCNC: 28.8 PG — SIGNIFICANT CHANGE UP (ref 27–31)
MCHC RBC-ENTMCNC: 30.4 G/DL — LOW (ref 32–37)
MCV RBC AUTO: 94.8 FL — SIGNIFICANT CHANGE UP (ref 81–99)
MONOCYTES # BLD AUTO: 0.97 K/UL — HIGH (ref 0.1–0.6)
MONOCYTES NFR BLD AUTO: 14 % — HIGH (ref 1.7–9.3)
NEUTROPHILS # BLD AUTO: 4.24 K/UL — SIGNIFICANT CHANGE UP (ref 1.4–6.5)
NEUTROPHILS NFR BLD AUTO: 61 % — SIGNIFICANT CHANGE UP (ref 42.2–75.2)
NRBC # BLD: 0 /100 WBCS — SIGNIFICANT CHANGE UP (ref 0–0)
PLATELET # BLD AUTO: 118 K/UL — LOW (ref 130–400)
POTASSIUM SERPL-MCNC: 4.6 MMOL/L — SIGNIFICANT CHANGE UP (ref 3.5–5)
POTASSIUM SERPL-SCNC: 4.6 MMOL/L — SIGNIFICANT CHANGE UP (ref 3.5–5)
PROT SERPL-MCNC: 5.8 G/DL — LOW (ref 6–8)
RBC # BLD: 3.09 M/UL — LOW (ref 4.2–5.4)
RBC # FLD: 19.9 % — HIGH (ref 11.5–14.5)
SODIUM SERPL-SCNC: 146 MMOL/L — SIGNIFICANT CHANGE UP (ref 135–146)
WBC # BLD: 6.95 K/UL — SIGNIFICANT CHANGE UP (ref 4.8–10.8)
WBC # FLD AUTO: 6.95 K/UL — SIGNIFICANT CHANGE UP (ref 4.8–10.8)

## 2022-03-16 PROCEDURE — 99239 HOSP IP/OBS DSCHRG MGMT >30: CPT

## 2022-03-16 RX ORDER — MUPIROCIN 20 MG/G
1 OINTMENT TOPICAL
Refills: 0 | Status: DISCONTINUED | OUTPATIENT
Start: 2022-03-16 | End: 2022-03-16

## 2022-03-16 RX ADMIN — Medication 20 MILLIGRAM(S): at 05:44

## 2022-03-16 RX ADMIN — CHLORHEXIDINE GLUCONATE 1 APPLICATION(S): 213 SOLUTION TOPICAL at 05:44

## 2022-03-16 RX ADMIN — PANTOPRAZOLE SODIUM 40 MILLIGRAM(S): 20 TABLET, DELAYED RELEASE ORAL at 05:44

## 2022-03-16 RX ADMIN — Medication 650 MILLIGRAM(S): at 14:13

## 2022-03-16 RX ADMIN — Medication 25 MILLIGRAM(S): at 05:43

## 2022-03-16 NOTE — PROGRESS NOTE ADULT - SUBJECTIVE AND OBJECTIVE BOX
LATRICIA ARREAGA 76y Female  MRN#: [id number]     Hospital Day: 12d    Pt is currently admitted with the primary diagnosis of anemia    SUBJECTIVE    No Overnight events     No Subjective complaints     patient seen and examined    Present Today:   - Maite:  No                                            ----------------------------------------------------------  OBJECTIVE  PAST MEDICAL & SURGICAL HISTORY  HTN (hypertension)    Heart murmur    Eczema    Anemia    Aortic stenosis    H/O appendicitis    H/O cholecystitis                                              -----------------------------------------------------------  ALLERGIES:  aspirin (Rash)  latex (Unknown)  penicillins (Unknown)                                            ------------------------------------------------------------    HOME MEDICATIONS  Home Medications:  aluminum hydroxide-magnesium hydroxide 200 mg-200 mg/5 mL oral suspension: 30 milliliter(s) orally every 4 hours, As needed, Dyspepsia (24 Jan 2022 12:15)  diphenhydrAMINE 25 mg oral capsule: 1 cap(s) orally every 4 hours, As needed, Rash and/or Itching (13 Mar 2022 00:13)  melatonin 3 mg oral tablet: 1 tab(s) orally once a day (at bedtime), As needed, Insomnia (24 Jan 2022 12:15)  Metoprolol Tartrate 25 mg oral tablet: 1 tab(s) orally 2 times a day (18 Jan 2022 00:11)  pantoprazole 40 mg oral delayed release tablet: 1 tab(s) orally 2 times a day (13 Mar 2022 00:13)                           MEDICATIONS:  STANDING MEDICATIONS  chlorhexidine 4% Liquid 1 Application(s) Topical <User Schedule>  metoprolol tartrate 25 milliGRAM(s) Oral two times a day  pantoprazole    Tablet 40 milliGRAM(s) Oral two times a day  torsemide 20 milliGRAM(s) Oral daily    PRN MEDICATIONS  acetaminophen     Tablet .. 650 milliGRAM(s) Oral every 6 hours PRN  aluminum hydroxide/magnesium hydroxide/simethicone Suspension 30 milliLiter(s) Oral every 4 hours PRN  diphenhydrAMINE 25 milliGRAM(s) Oral every 4 hours PRN  ondansetron Injectable 4 milliGRAM(s) IV Push every 8 hours PRN                                            ------------------------------------------------------------  VITAL SIGNS: Last 24 Hours  T(C): 36 (16 Mar 2022 07:59), Max: 36 (16 Mar 2022 07:59)  T(F): 96.8 (16 Mar 2022 07:59), Max: 96.8 (16 Mar 2022 07:59)  HR: 60 (16 Mar 2022 07:59) (60 - 70)  BP: 132/66 (16 Mar 2022 07:59) (129/59 - 132/66)  BP(mean): --  RR: 18 (16 Mar 2022 07:59) (18 - 18)  SpO2: 96% (15 Mar 2022 15:54) (96% - 96%)      03-15-22 @ 07:01  -  03-16-22 @ 07:00  --------------------------------------------------------  IN: 420 mL / OUT: 1000 mL / NET: -580 mL    03-16-22 @ 07:01  -  03-16-22 @ 11:25  --------------------------------------------------------  IN: 360 mL / OUT: 1100 mL / NET: -740 mL                                             --------------------------------------------------------------  LABS:                        8.2    5.48  )-----------( 112      ( 15 Mar 2022 07:10 )             27.9     03-15    151<H>  |  111<H>  |  78<HH>  ----------------------------<  78  4.7   |  25  |  3.0<H>    Ca    8.5      15 Mar 2022 07:10  Mg     2.0     03-15    TPro  5.4<L>  /  Alb  3.6  /  TBili  0.7  /  DBili  x   /  AST  14  /  ALT  13  /  AlkPhos  54  03-15                                                              -------------------------------------------------------------  RADIOLOGY:                                            --------------------------------------------------------------    PHYSICAL EXAM:    GENERAL: in NAD  HEENT: atraumatic  Cardiac: 3/6 systolic murmur  Respiratory: decreased bilateral breath sounds (L>R)  Abdomen: no tenderness to palpation. bowel sounds present  Extremities: bilateral 1+ pitting edema (R>L), chronic  Skin: no rashes or lesions

## 2022-03-16 NOTE — PROGRESS NOTE ADULT - REASON FOR ADMISSION
Low Hb

## 2022-03-16 NOTE — PROGRESS NOTE ADULT - ATTENDING COMMENTS
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       Acute on chronic anemia with transfusion dependence--s/p 4 units PRBC. H/H currently stable  GI-As per family wishes holding off on scope unless clinical deterioration  Possible DARRIN- Venofer x 3 days, Received retacrit [id number] U x 1 on 3/7  Prior h/o  gastric AVM  bleeding    Severe AS-TAVR deferred for now once active GI bleed, anemia, and EVAN have resolve   EVAN over CKD 4 -possible CRS vs AIN. completed 5 days of po steroids.     Acute HFpEF / severe AS. Possibly euvolemic now. Switched to Torsemide 20 mg . Lasix discontinued  Continue current medical management for active chronic comorbid conditions.    spoke with Insurance company for peer to peer review- patient cannot get SNF-- advised home with home care--  will talk with patient. 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       Acute on chronic anemia with transfusion dependence--s/p 4 units PRBC. H/H currently stable  GI-As per family wishes holding off on scope unless clinical deterioration  Possible DARRIN- Venofer x 3 days, Received retacrit [id number] U x 1 on 3/7  Prior h/o  gastric AVM  bleeding    Severe AS-TAVR deferred for now once active GI bleed, anemia, and EVAN have resolve   EVAN over CKD 4 -possible CRS vs AIN. completed 5 days of po steroids.     Acute HFpEF / severe AS. Possibly euvolemic now. Switched to Torsemide 20 mg . Lasix discontinued  Continue current medical management for active chronic comorbid conditions.    spoke with Insurance company for peer to peer review- patient cannot get SNF-- advised home with home care--  will talk with patient.  SNF applied for medicaid and ready to take patient home-- spent more than 30mins.

## 2022-03-16 NOTE — PROGRESS NOTE ADULT - ASSESSMENT
[facility]    [id number]    [facility] [address]    Phone:  [phone]       Thank You for choosing us for your health care visit. We are glad to serve you and happy to provide you with this summary of your visit. Please help us to ensure we have accurate records. If you find anything that needs to be changed, please let our staff know as soon as possible.          Your Demographic Information     Patient Name Sex Candelario Wagner N Male 1994       Ethnic Group Patient Race    Not of  or  Origin White      Your Visit Details     Date & Time Provider Department    3/30/2017 9:30 AM Mehul Soto MD [facility]      We Ordered or Performed the Following     SERVICE TO PHYSICAL THERAPY       Conditions Discussed Today or Order-Related Diagnoses        Comments    Annual physical exam    -  Primary     Right leg pain           Your Vitals Were     BP Pulse Height Weight BMI Smoking Status    132/64 76 6' (1.829 m) 171 lb 3.2 oz (77.7 kg) 23.22 kg/m2 Never Smoker      Medications Prescribed or Re-Ordered Today     None      Your Current Medications Are        Disp Refills Start End    metroNIDAZOLE (METROGEL) 0.75 % gel 45 g 1 2016     Sig: Apply and rub a thin film twice daily, morning and evening, to entire affected areas after washing    Class: Eprescribe      Discontinued Medications        Reason for Discontinue    losartan (COZAAR) 100 MG tablet Not Needed      Allergies     Gentamicin Sulfate     Garamycin eye gtts, reddened sclera      Immunizations History as of 3/30/2017     Name Date    DPT/HIB 1993, 1993, 1993    DTaP 1998, 1994    HIB 1994    HPV QUAD 3/21/2014, 2013, 2013    Hepatitis B Child 1993, 1993, 1993    Influenza 10/26/2016, 10/28/2015, 2013    Influenza Quadrivalent Live 2015    MMR 1997, 1994    Meningococcal  76 year old female patient with multiple comorbidities including severe AS, HTN, CKD, sciatica, and anemia recently found to have evidence of 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 patient is pending dispo to NH    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 Bleeding in setting of CKD and DARRIN  #Moderate Diverticulosis   #Grade II Internal Hemorrhoids  #Erosive Gastritis  * Anemia Likely Secondary to obscure GI bleeding (from Duodenum and Gastric AVM per recent capsule endoscopy 02/2022 after presenting with melena), Fe Deficiency (TFN S% 5 in 2020 and ferritin 28 in 2022), and CKD per Dr Silver  * Receives Transfusions q1-2 weeks and Retacrit 03675 units weekly  * Recent Admission in 02/2022 for melena: s/p capsule endoscopy showing bleeding from Duodenum and Gastric AVM   * Moderate Diverticulosis and Grade II Internal Hemorrhoids on colonoscopy 10/22/2019  * Erosive Gastritis on EGD 01/09/2020  * Hemodynamically stable in ED, OLEKSANDR with brown stool, Hb 4.5 s/p 3 units pRBC and lasix IV     - GI team consulted: per previous admission and Dr Paula's risk stratification, patient at high risk for endoscopic interventions in setting of severe AS. pt would only want endoscopic intervention for overt evidence of bleeding  - s/p 4 u pRBC in total  - keep Hgb >8, active T/S  - Trend CBC closely and transfuse as needed (avoid volume overload with diuresis but keep an eye on BP in setting of severe AS)  - c/w PPI IV q12-->PPI PO 12 3/10  - monitor BM  - LDH, retic count elevated. haptoglobin wnl  - retacrit 20,000unit x 1 dose 3/7 and venofer 200mg x 3 doses starting 3/7 as per heme/onc recs  - f/u wvf antigen 198, vwf activity 188, and vf multimers, r/o Heyde syndrome  - as per discussion with GI fellow 3/9, pt does not have any signs of active bleeding, no acute interventions  - pt at high risk for rebleed, nephro recommending consulting advanced GI. will need outpatient follow up with GI and heme/onc    #Acute HFpEF  #severe AS  * Last TTE 02/17/22 EF 74%, severe AS with A 0.8cm2 and mean P 48.9,  mild MR  Acute on Chronic HFmEF Exacerbation  * Cardiologist Dr Paula  * S/P IV Lasix 40mg x1 dose in ED  * BNP 3116    - Cardiology Dr Paula consulted for discussion of TAVR/ SAVR in setting of severe AS and high risk for endoscopic procedures (patient needs a solution for GI bleeding which would only be achieved with an endoscopic intervention)  - Monitor in/out  - Daily weight (standing)  - Monitor SaO2 and O2 requirements  - Salt restricted diet and Fluid restriction to 1.2L per 24 hours  - cardio consult: cath and possible TAVR potentially in future once active GI bleed, anemia, and EVAN resolved  - discussed with cardiology fellow 3/9, pt will require medical optimization before cardio can work her up for potential cath/TAVR in the future  - accurate in's/outs, maintain negative balance  - HF recs appreciated  - c/w metoprolol 25 BID  - lasix 40mg IV q12 -> changed to IV lasix 40mg 3/8 (dc'ed 3/11) --> torsmide 20mg qdaily and extra tablet for >2lb weight gain/day (starting 3/12)  - f/u HF outpatient    #EVAN on CKD4 vs progressive CKD- likely CRS- stable  CKD Progression Versus Cardiorenal (volume overload) or Prerenal EVAN (slow GI bleeding)  * Baseline Cr 1.7-2.2 2022  * Recent admission with Cr 2.7-2.9 throughout  * ED BUN 60, Cr 2.7    - U/S: no hydronephrosis  - c/w diuresis for volume overload and monitor  - monitor BMP  - renal/bladder U/S- no stones or hydro  - nephro following: CRS vs AIN  - U/A negative  - PO steroids x 5 days for rash/itching  - f/u urine eosinophils, phosphorus levels    #Hypertension  * Home lopressor 25mg BID  * ED /44mmHg  - Monitor BP and avoid hypotension in setting of severe AS  - Resume home anti HTN with holding parameters  - Diuretics as above    #Thrombocytopenia- improving    #Sciatica  - PT once more euvolemic  - Pain control    #Rash-possibly allergic- improving  - s/p prednisone 20mg x2 doses 3/6 and 3/8  - pt reports that she always gets premedicated before getting blood transfusions  - in setting of ruling out AIN, will give prednisone 50mg x 5 days (started 3/10)    #Misc:  - DVT Prophylaxis: SCDs  - GI Prophylaxis: protonix IV BID  - Diet: soft and bite sized, 1.2L fluid restriction  - Code Status: Full  - Dispo:  pending auth for dc to [facility]   Conjugate MCV4P (Menactra) 1/8/2013    Meningococcus 5/9/2008    Polio, ORAL 7/11/1998, 12/8/1994, 1993, 1993    Tb Michelle 5/17/1994    Td:Adult type tetanus/diphtheria 5/19/2005    Tdap 8/24/2010    Varicella 10/25/2007, 5/30/1996      Problem List as of 3/30/2017     SPRAIN OF LEFT ANKLE NOS    Lipid [id number]( LDL81 TG55 HDL54)            Patient Instructions     None

## 2022-03-16 NOTE — PROGRESS NOTE ADULT - SUBJECTIVE AND OBJECTIVE BOX
Nephrology Progress Note    LATRICIA ARREAGA  MRN-[id number]  76y  Female    S:  Patient is seen and examined, events over the last 24h noted.    O:  Allergies:  aspirin (Rash)  latex (Unknown)  penicillins (Unknown)    Hospital Medications:   MEDICATIONS  (STANDING):  chlorhexidine 4% Liquid 1 Application(s) Topical <User Schedule>  metoprolol tartrate 25 milliGRAM(s) Oral two times a day  pantoprazole    Tablet 40 milliGRAM(s) Oral two times a day  torsemide 20 milliGRAM(s) Oral daily    MEDICATIONS  (PRN):  acetaminophen     Tablet .. 650 milliGRAM(s) Oral every 6 hours PRN Mild Pain (1 - 3)  aluminum hydroxide/magnesium hydroxide/simethicone Suspension 30 milliLiter(s) Oral every 4 hours PRN Dyspepsia  diphenhydrAMINE 25 milliGRAM(s) Oral every 4 hours PRN Rash and/or Itching  ondansetron Injectable 4 milliGRAM(s) IV Push every 8 hours PRN Nausea and/or Vomiting    Home Medications:  aluminum hydroxide-magnesium hydroxide 200 mg-200 mg/5 mL oral suspension: 30 milliliter(s) orally once a day, As Needed (16 Mar 2022 16:27)  diphenhydrAMINE 25 mg oral capsule: 1 cap(s) orally once a day (at bedtime), As Needed (16 Mar 2022 16:27)  melatonin 3 mg oral tablet: 1 tab(s) orally once a day (at bedtime), As needed, Insomnia (2022 12:15)  Metoprolol Tartrate 25 mg oral tablet: 1 tab(s) orally 2 times a day (2022 00:11)  pantoprazole 40 mg oral delayed release tablet: 1 tab(s) orally 2 times a day (13 Mar 2022 00:13)      VITALS:  Daily     Daily   T(F): 96.3 (22 @ 16:05), Max: 96.8 (22 @ 07:59)  HR: 63 (22 @ 16:05)  BP: 132/61 (22 @ 16:05)  RR: 18 (22 @ 16:05)  SpO2: --  Wt(kg): --  I&O's Detail    15 Mar 2022 07:01  -  16 Mar 2022 07:00  --------------------------------------------------------  IN:    Oral Fluid: 420 mL  Total IN: 420 mL    OUT:    Voided (mL): 1000 mL  Total OUT: 1000 mL    Total NET: -580 mL      16 Mar 2022 07:  -  16 Mar 2022 17:03  --------------------------------------------------------  IN:    Oral Fluid: 720 mL  Total IN: 720 mL    OUT:    Voided (mL): 1500 mL  Total OUT: 1500 mL    Total NET: -780 mL        I&O's Summary    15 Mar 2022 07:  -  16 Mar 2022 07:00  --------------------------------------------------------  IN: 420 mL / OUT: 1000 mL / NET: -580 mL    16 Mar 2022 07:  -  16 Mar 2022 17:03  --------------------------------------------------------  IN: 720 mL / OUT: 1500 mL / NET: -780 mL          PHYSICAL EXAM:  Gen: NAD  Resp: CTAB  Card: S1/S2  Abd: soft  Extremities:   Vascular access:       LABS:          146  |  107  |  85<HH>  ----------------------------<  88  4.6   |  25  |  3.0<H>    Ca    8.6      16 Mar 2022 11:55  Mg     2.1     -    TPro  5.8<L>  /  Alb  3.7  /  TBili  1.0  /  DBili      /  AST  17  /  ALT  15  /  AlkPhos  55      eGFR if Non African American: 16 mL/min/1.73M2 (22 @ 04:30)  eGFR if African American: 18 mL/min/1.73M2 (22 @ 04:30)    Phosphorus Level, Serum: 3.9 mg/dL (22 @ 09:31)                            8.9    6.95  )-----------( 118      ( 16 Mar 2022 11:55 )             29.3     Mean Cell Volume: 94.8 fL (22 @ 11:55)    % Saturation, Iron: 15 % (22 @ 12:22)  Ferritin, Serum: 41 ng/mL (22 @ 12:22)      Urine Studies:  Urinalysis Basic - ( 10 Mar 2022 18:13 )    Color: Light Yellow / Appearance: Clear / S.013 / pH:   Gluc:  / Ketone: Negative  / Bili: Negative / Urobili: <2 mg/dL   Blood:  / Protein: Negative / Nitrite: Negative   Leuk Esterase: Negative / RBC:  / WBC    Sq Epi:  / Non Sq Epi:  / Bacteria:         Urea Nitrogen,  Random Urine: 449 mg/dL (22 @ 18:30)    Sodium, Random Urine: 30.0 mmoL/L ( @ 23:50)  Creatinine, Random Urine: 128 mg/dL ( @ 23:50)    Creatinine trend:  Creatinine, Serum: 3.0 mg/dL (22 @ 11:55)  Creatinine, Serum: 3.0 mg/dL (03-15-22 @ 07:10)  Creatinine, Serum: 3.0 mg/dL (22 @ 04:30)  Creatinine, Serum: 2.9 mg/dL (22 @ 07:45)  Creatinine, Serum: 3.0 mg/dL (22 @ 07:36)  Creatinine, Serum: 2.9 mg/dL (22 @ 07:04)  Creatinine, Serum: 3.1 mg/dL (03-10-22 @ 04:30)  Creatinine, Serum: 3.3 mg/dL (22 @ 06:25)  Creatinine, Serum: 3.3 mg/dL (22 @ 08:39)  Creatinine, Serum: 3.2 mg/dL (22 @ 06:30)  Creatinine, Serum: 3.3 mg/dL (22 @ 19:12)  Creatinine, Serum: 3.1 mg/dL (22 @ 04:30)  Creatinine, Serum: 2.7 mg/dL (22 @ 12:30)  Creatinine, Serum: 2.8 mg/dL (22 @ 04:30)  Creatinine, Serum: 2.7 mg/dL (22 @ 02:00)  Creatinine, Serum: 3.0 mg/dL (22 @ 16:00)  Creatinine, Serum: 2.9 mg/dL (22 @ 07:00)  Creatinine, Serum: 2.8 mg/dL (22 @ 21:30)  Creatinine, Serum: 3.1 mg/dL (22 @ 05:19)  Creatinine, Serum: 3.4 mg/dL (22 @ 10:50)    Serum Protein Electrophoresis Interp: Weak Gamma-Migrating Paraprotein Identified (22 @ 05:43)  Immunofixation, Serum:   Weak IgG Lambda Band Identified    Reference Range: None Detected (22 @ 05:43)  FABY Lambda: 7.77 mg/dL (22 @ 05:43)      US Kidney and Bladder:   ACC: [id number] EXAM:  US KIDNEYS AND BLADDER                          PROCEDURE DATE:  2022          INTERPRETATION:  CLINICAL INFORMATION: Hydronephrosis.    COMPARISON: 2022    TECHNIQUE: Sonography of the kidneys and bladder.    FINDINGS: Limited evaluation due to body habitus.    Right kidney: 10.4 cm. No hydronephrosis or calculi.    Left kidney:  10.0 cm. No hydronephrosis or calculi.    Urinary bladder: Empty, limiting assessment.    IMPRESSION:    Limited evaluation due to body habitus.  No stones or hydronephrosis.        --- End of Report ---            OSCAR GALINDO MD; Attending Radiologist  This document has been electronically signed. Mar  9 2022  2:41PM (22 @ 14:31)

## 2022-03-16 NOTE — DISCHARGE NOTE NURSING/CASE MANAGEMENT/SOCIAL WORK - PATIENT PORTAL LINK FT
You can access the FollowMyHealth Patient Portal offered by [facility] by registering at the following website: [email or web address]. By joining [facility]’s FollowMyHealth portal, you will also be able to view your health information using other applications (apps) compatible with our system.

## 2022-03-16 NOTE — DISCHARGE NOTE NURSING/CASE MANAGEMENT/SOCIAL WORK - NSDCPEFALRISK_GEN_ALL_CORE
For information on Fall & Injury Prevention, visit: [email or web address] OR  [email or web address] OR  https://www.cdc.gov/steadi/patient.html

## 2022-03-21 DIAGNOSIS — K31.811 ANGIODYSPLASIA OF STOMACH AND DUODENUM WITH BLEEDING: ICD-10-CM

## 2022-03-21 DIAGNOSIS — M54.30 SCIATICA, UNSPECIFIED SIDE: ICD-10-CM

## 2022-03-21 DIAGNOSIS — I13.0 HYPERTENSIVE HEART AND CHRONIC KIDNEY DISEASE WITH HEART FAILURE AND STAGE 1 THROUGH STAGE 4 CHRONIC KIDNEY DISEASE, OR UNSPECIFIED CHRONIC KIDNEY DISEASE: ICD-10-CM

## 2022-03-21 DIAGNOSIS — I50.33 ACUTE ON CHRONIC DIASTOLIC (CONGESTIVE) HEART FAILURE: ICD-10-CM

## 2022-03-21 DIAGNOSIS — N18.4 CHRONIC KIDNEY DISEASE, STAGE 4 (SEVERE): ICD-10-CM

## 2022-03-21 DIAGNOSIS — I25.10 ATHEROSCLEROTIC HEART DISEASE OF NATIVE CORONARY ARTERY WITHOUT ANGINA PECTORIS: ICD-10-CM

## 2022-03-21 DIAGNOSIS — Z88.0 ALLERGY STATUS TO PENICILLIN: ICD-10-CM

## 2022-03-21 DIAGNOSIS — N17.9 ACUTE KIDNEY FAILURE, UNSPECIFIED: ICD-10-CM

## 2022-03-21 DIAGNOSIS — E87.0 HYPEROSMOLALITY AND HYPERNATREMIA: ICD-10-CM

## 2022-03-21 DIAGNOSIS — Z91.040 LATEX ALLERGY STATUS: ICD-10-CM

## 2022-03-21 DIAGNOSIS — K64.8 OTHER HEMORRHOIDS: ICD-10-CM

## 2022-03-21 DIAGNOSIS — E66.01 MORBID (SEVERE) OBESITY DUE TO EXCESS CALORIES: ICD-10-CM

## 2022-03-21 DIAGNOSIS — Z95.5 PRESENCE OF CORONARY ANGIOPLASTY IMPLANT AND GRAFT: ICD-10-CM

## 2022-03-21 DIAGNOSIS — D69.6 THROMBOCYTOPENIA, UNSPECIFIED: ICD-10-CM

## 2022-03-21 DIAGNOSIS — E87.5 HYPERKALEMIA: ICD-10-CM

## 2022-03-21 DIAGNOSIS — Z88.6 ALLERGY STATUS TO ANALGESIC AGENT: ICD-10-CM

## 2022-03-21 DIAGNOSIS — D62 ACUTE POSTHEMORRHAGIC ANEMIA: ICD-10-CM

## 2022-03-21 DIAGNOSIS — I35.0 NONRHEUMATIC AORTIC (VALVE) STENOSIS: ICD-10-CM

## 2022-03-21 DIAGNOSIS — K29.70 GASTRITIS, UNSPECIFIED, WITHOUT BLEEDING: ICD-10-CM

## 2022-03-24 ENCOUNTER — LABORATORY RESULT (OUTPATIENT)
Age: 77
End: 2022-03-24

## 2022-03-24 ENCOUNTER — APPOINTMENT (OUTPATIENT)
Dept: INFUSION THERAPY | Facility: CLINIC | Age: 77
End: 2022-03-24
Payer: MEDICARE

## 2022-03-24 ENCOUNTER — APPOINTMENT (OUTPATIENT)
Dept: HEMATOLOGY ONCOLOGY | Facility: CLINIC | Age: 77
End: 2022-03-24
Payer: MEDICARE

## 2022-03-24 VITALS
RESPIRATION RATE: 16 BRPM | SYSTOLIC BLOOD PRESSURE: 106 MMHG | OXYGEN SATURATION: 98 % | BODY MASS INDEX: 41.14 KG/M2 | HEART RATE: 72 BPM | TEMPERATURE: 98.1 F | WEIGHT: 256 LBS | HEIGHT: 66 IN | DIASTOLIC BLOOD PRESSURE: 49 MMHG

## 2022-03-24 LAB
ABO + RH PNL BLD: NORMAL
BLD GP AB SCN SERPL QL: NORMAL
HCT VFR BLD CALC: 21.4 %
HGB BLD-MCNC: 6.8 G/DL
MCHC RBC-ENTMCNC: 30.1 PG
MCHC RBC-ENTMCNC: 31.8 G/DL
MCV RBC AUTO: 94.7 FL
PLATELET # BLD AUTO: 104 K/UL
PMV BLD: 10.9 FL
RBC # BLD: 2.26 M/UL
RBC # FLD: 18.6 %
WBC # FLD AUTO: 5.18 K/UL

## 2022-03-24 PROCEDURE — 99213 OFFICE O/P EST LOW 20 MIN: CPT

## 2022-03-24 RX ORDER — IRON SUCROSE 20 MG/ML
200 INJECTION, SOLUTION INTRAVENOUS ONCE
Refills: 0 | Status: COMPLETED | OUTPATIENT
Start: 2022-03-24 | End: 2022-03-24

## 2022-03-24 RX ORDER — ACETAMINOPHEN 500 MG
650 TABLET ORAL ONCE
Refills: 0 | Status: COMPLETED | OUTPATIENT
Start: 2022-03-24 | End: 2022-04-08

## 2022-03-24 RX ORDER — HYDROCORTISONE 20 MG
100 TABLET ORAL ONCE
Refills: 0 | Status: COMPLETED | OUTPATIENT
Start: 2022-03-24 | End: 2022-03-24

## 2022-03-24 RX ADMIN — IRON SUCROSE 220 MILLIGRAM(S): 20 INJECTION, SOLUTION INTRAVENOUS at 13:11

## 2022-03-24 RX ADMIN — Medication 100 MILLIGRAM(S): at 13:10

## 2022-03-24 RX ADMIN — Medication 650 MILLIGRAM(S): at 13:11

## 2022-03-25 ENCOUNTER — APPOINTMENT (OUTPATIENT)
Dept: INFUSION THERAPY | Facility: CLINIC | Age: 77
End: 2022-03-25

## 2022-03-25 RX ORDER — HYDROCORTISONE 20 MG
100 TABLET ORAL ONCE
Refills: 0 | Status: COMPLETED | OUTPATIENT
Start: 2022-03-25 | End: 2022-03-25

## 2022-03-25 RX ORDER — ACETAMINOPHEN 500 MG
650 TABLET ORAL ONCE
Refills: 0 | Status: COMPLETED | OUTPATIENT
Start: 2022-03-25 | End: 2022-03-31

## 2022-03-25 RX ORDER — FUROSEMIDE 40 MG
20 TABLET ORAL ONCE
Refills: 0 | Status: COMPLETED | OUTPATIENT
Start: 2022-03-25 | End: 2022-03-25

## 2022-03-25 RX ADMIN — Medication 650 MILLIGRAM(S): at 13:38

## 2022-03-25 RX ADMIN — Medication 20 MILLIGRAM(S): at 15:44

## 2022-03-25 RX ADMIN — Medication 100 MILLIGRAM(S): at 13:38

## 2022-03-31 ENCOUNTER — APPOINTMENT (OUTPATIENT)
Dept: INFUSION THERAPY | Facility: CLINIC | Age: 77
End: 2022-03-31

## 2022-03-31 ENCOUNTER — LABORATORY RESULT (OUTPATIENT)
Age: 77
End: 2022-03-31

## 2022-03-31 RX ORDER — IRON SUCROSE 20 MG/ML
200 INJECTION, SOLUTION INTRAVENOUS ONCE
Refills: 0 | Status: COMPLETED | OUTPATIENT
Start: 2022-03-31 | End: 2022-03-31

## 2022-03-31 RX ORDER — ACETAMINOPHEN 500 MG
650 TABLET ORAL ONCE
Refills: 0 | Status: COMPLETED | OUTPATIENT
Start: 2022-03-31 | End: 2022-05-06

## 2022-03-31 RX ORDER — HYDROCORTISONE 20 MG
100 TABLET ORAL ONCE
Refills: 0 | Status: COMPLETED | OUTPATIENT
Start: 2022-03-31 | End: 2022-03-31

## 2022-03-31 RX ADMIN — IRON SUCROSE 220 MILLIGRAM(S): 20 INJECTION, SOLUTION INTRAVENOUS at 14:55

## 2022-03-31 RX ADMIN — Medication 650 MILLIGRAM(S): at 14:55

## 2022-03-31 RX ADMIN — Medication 100 MILLIGRAM(S): at 14:55

## 2022-04-01 LAB
HCT VFR BLD CALC: 23.6 %
HGB BLD-MCNC: 7.4 G/DL
MCHC RBC-ENTMCNC: 30.2 PG
MCHC RBC-ENTMCNC: 31.4 G/DL
MCV RBC AUTO: 96.3 FL
PLATELET # BLD AUTO: 119 K/UL
PMV BLD: 9.5 FL
RBC # BLD: 2.45 M/UL
RBC # FLD: 19.2 %
WBC # FLD AUTO: 3.47 K/UL

## 2022-04-04 LAB — VWF CBA/VWF AG PPP IA-RTO: SIGNIFICANT CHANGE UP

## 2022-04-06 ENCOUNTER — APPOINTMENT (OUTPATIENT)
Dept: CARDIOLOGY | Facility: CLINIC | Age: 77
End: 2022-04-06

## 2022-04-07 ENCOUNTER — LABORATORY RESULT (OUTPATIENT)
Age: 77
End: 2022-04-07

## 2022-04-07 ENCOUNTER — APPOINTMENT (OUTPATIENT)
Dept: INFUSION THERAPY | Facility: CLINIC | Age: 77
End: 2022-04-07

## 2022-04-07 LAB
ABO + RH PNL BLD: NORMAL
BLD GP AB SCN SERPL QL: NORMAL
HCT VFR BLD CALC: 21.8 %
HGB BLD-MCNC: 6.9 G/DL
MCHC RBC-ENTMCNC: 31.2 PG
MCHC RBC-ENTMCNC: 31.7 G/DL
MCV RBC AUTO: 98.6 FL
PLATELET # BLD AUTO: 134 K/UL
PMV BLD: 10.4 FL
RBC # BLD: 2.21 M/UL
RBC # FLD: 18.5 %
WBC # FLD AUTO: 4.54 K/UL

## 2022-04-07 RX ORDER — HYDROCORTISONE 20 MG
100 TABLET ORAL ONCE
Refills: 0 | Status: COMPLETED | OUTPATIENT
Start: 2022-04-07 | End: 2022-04-07

## 2022-04-07 RX ORDER — IRON SUCROSE 20 MG/ML
200 INJECTION, SOLUTION INTRAVENOUS ONCE
Refills: 0 | Status: COMPLETED | OUTPATIENT
Start: 2022-04-07 | End: 2022-04-07

## 2022-04-07 RX ORDER — ACETAMINOPHEN 500 MG
650 TABLET ORAL ONCE
Refills: 0 | Status: COMPLETED | OUTPATIENT
Start: 2022-04-07 | End: 2022-04-07

## 2022-04-07 RX ADMIN — Medication 650 MILLIGRAM(S): at 11:16

## 2022-04-07 RX ADMIN — IRON SUCROSE 220 MILLIGRAM(S): 20 INJECTION, SOLUTION INTRAVENOUS at 11:16

## 2022-04-07 RX ADMIN — Medication 100 MILLIGRAM(S): at 11:16

## 2022-04-08 ENCOUNTER — APPOINTMENT (OUTPATIENT)
Dept: INFUSION THERAPY | Facility: CLINIC | Age: 77
End: 2022-04-08

## 2022-04-08 RX ORDER — ACETAMINOPHEN 500 MG
650 TABLET ORAL ONCE
Refills: 0 | Status: COMPLETED | OUTPATIENT
Start: 2022-04-08 | End: 2022-04-21

## 2022-04-08 RX ORDER — HYDROCORTISONE 20 MG
100 TABLET ORAL ONCE
Refills: 0 | Status: COMPLETED | OUTPATIENT
Start: 2022-04-08 | End: 2022-04-08

## 2022-04-08 RX ADMIN — Medication 100 MILLIGRAM(S): at 10:34

## 2022-04-08 RX ADMIN — Medication 650 MILLIGRAM(S): at 10:34

## 2022-04-14 ENCOUNTER — APPOINTMENT (OUTPATIENT)
Dept: HEMATOLOGY ONCOLOGY | Facility: CLINIC | Age: 77
End: 2022-04-14

## 2022-04-14 ENCOUNTER — LABORATORY RESULT (OUTPATIENT)
Age: 77
End: 2022-04-14

## 2022-04-14 LAB
HCT VFR BLD CALC: 25.5 %
HGB BLD-MCNC: 7.9 G/DL
MCHC RBC-ENTMCNC: 30.6 PG
MCHC RBC-ENTMCNC: 31 G/DL
MCV RBC AUTO: 98.8 FL
PLATELET # BLD AUTO: 113 K/UL
PMV BLD: 9.7 FL
RBC # BLD: 2.58 M/UL
RBC # FLD: 16.2 %
WBC # FLD AUTO: 3.46 K/UL

## 2022-04-15 LAB — FERRITIN SERPL-MCNC: 163 NG/ML

## 2022-04-21 ENCOUNTER — APPOINTMENT (OUTPATIENT)
Dept: HEMATOLOGY ONCOLOGY | Facility: CLINIC | Age: 77
End: 2022-04-21
Payer: MEDICARE

## 2022-04-21 ENCOUNTER — APPOINTMENT (OUTPATIENT)
Dept: INFUSION THERAPY | Facility: CLINIC | Age: 77
End: 2022-04-21

## 2022-04-21 ENCOUNTER — APPOINTMENT (OUTPATIENT)
Dept: INFUSION THERAPY | Facility: CLINIC | Age: 77
End: 2022-04-21
Payer: MEDICARE

## 2022-04-21 ENCOUNTER — LABORATORY RESULT (OUTPATIENT)
Age: 77
End: 2022-04-21

## 2022-04-21 VITALS
WEIGHT: 256 LBS | TEMPERATURE: 97.1 F | HEIGHT: 66 IN | HEART RATE: 80 BPM | RESPIRATION RATE: 16 BRPM | DIASTOLIC BLOOD PRESSURE: 60 MMHG | SYSTOLIC BLOOD PRESSURE: 130 MMHG | BODY MASS INDEX: 41.14 KG/M2 | OXYGEN SATURATION: 98 %

## 2022-04-21 PROCEDURE — 99213 OFFICE O/P EST LOW 20 MIN: CPT

## 2022-04-21 RX ORDER — HYDROCORTISONE 20 MG
100 TABLET ORAL ONCE
Refills: 0 | Status: COMPLETED | OUTPATIENT
Start: 2022-04-21 | End: 2022-04-21

## 2022-04-21 RX ORDER — ACETAMINOPHEN 500 MG
650 TABLET ORAL ONCE
Refills: 0 | Status: COMPLETED | OUTPATIENT
Start: 2022-04-21 | End: 2022-06-01

## 2022-04-21 RX ORDER — IRON SUCROSE 20 MG/ML
200 INJECTION, SOLUTION INTRAVENOUS ONCE
Refills: 0 | Status: COMPLETED | OUTPATIENT
Start: 2022-04-21 | End: 2022-04-21

## 2022-04-21 RX ADMIN — Medication 650 MILLIGRAM(S): at 15:09

## 2022-04-21 RX ADMIN — IRON SUCROSE 220 MILLIGRAM(S): 20 INJECTION, SOLUTION INTRAVENOUS at 15:08

## 2022-04-21 RX ADMIN — Medication 100 MILLIGRAM(S): at 15:07

## 2022-04-22 LAB
ABO + RH PNL BLD: NORMAL
ALBUMIN SERPL ELPH-MCNC: 3.6 G/DL
ALP BLD-CCNC: 79 U/L
ALT SERPL-CCNC: 7 U/L
ANION GAP SERPL CALC-SCNC: 14 MMOL/L
AST SERPL-CCNC: 18 U/L
BILIRUB SERPL-MCNC: 0.5 MG/DL
BLD GP AB SCN SERPL QL: NORMAL
BUN SERPL-MCNC: 54 MG/DL
CALCIUM SERPL-MCNC: 8.8 MG/DL
CHLORIDE SERPL-SCNC: 107 MMOL/L
CO2 SERPL-SCNC: 19 MMOL/L
CREAT SERPL-MCNC: 2.6 MG/DL
EGFR: 18 ML/MIN/1.73M2
FERRITIN SERPL-MCNC: 151 NG/ML
GLUCOSE SERPL-MCNC: 78 MG/DL
HAPTOGLOB SERPL-MCNC: 151 MG/DL
HCT VFR BLD CALC: 26.7 %
HGB BLD-MCNC: 7.8 G/DL
LDH SERPL-CCNC: 195
MCHC RBC-ENTMCNC: 29.2 G/DL
MCHC RBC-ENTMCNC: 30.6 PG
MCV RBC AUTO: 104.7 FL
PLATELET # BLD AUTO: 137 K/UL
PMV BLD: 10.8 FL
POTASSIUM SERPL-SCNC: 4.3 MMOL/L
PROT SERPL-MCNC: 6.5 G/DL
RBC # BLD: 2.55 M/UL
RBC # FLD: 15 %
RETICS # AUTO: 3.7 %
RETICS AGGREG/RBC NFR: 95.1 K/UL
SODIUM SERPL-SCNC: 140 MMOL/L
WBC # FLD AUTO: 4.46 K/UL

## 2022-04-25 NOTE — PHYSICAL EXAM
[Ambulatory and capable of all self care but unable to carry out any work activities] : Status 2- Ambulatory and capable of all self care but unable to carry out any work activities. Up and about more than 50% of waking hours [Normal] : clear to auscultation bilaterally, no dullness, no wheezing [de-identified] : pale in no acute distress.  [de-identified] : grade 3/6 systolic murmur  [de-identified] : lower extremity edema ( R > L ) 1 to 2 plus .

## 2022-04-25 NOTE — PHYSICAL EXAM
[Ambulatory and capable of all self care but unable to carry out any work activities] : Status 2- Ambulatory and capable of all self care but unable to carry out any work activities. Up and about more than 50% of waking hours [Normal] : clear to auscultation bilaterally, no dullness, no wheezing [de-identified] : pale in no acute distress.  [de-identified] : grade 3/6 systolic murmur  [de-identified] : lower extremity edema ( R > L ) 1 to 2 plus .

## 2022-04-25 NOTE — HISTORY OF PRESENT ILLNESS
[de-identified] : 09/17/2020 Patient returns for follow up , she feels better after venofer X 4 and on EPO 10,000 weekly , Hb is up to 10 . \par \par 10/15/2020 Patient returns for follow up 2 weeks after last dose of EPO , today's Hb is 10.7 . she offers no new complaints . \par \par 04/08/2021 Patient returns for follow up for anemia on EPO and iron replacement . Hb is 10.8 . she complains of mild left arm pain after she started to self check her BP . ahe meds were recently adjusted by her PMD . \par \par 08/31/2021 patient returns for follow up , she offers no new complaints , her Hgb is slowly trending down after last transfusion and venofer X1 for ferritin : 35 . \par \par 11/18/2021 patient returns for follow up complaining of weakness and dyspnea on minimal exertion , still with lower extremity edema R > L . Hgb is 7.1 2 weeks after transfusion and despite venofer and EPO . She denies melena or hematochezia . Of note she had suspected small bowel bleeding on enteroscopy or capsule endoscopy and was intolerant to push enteroscopy ( hypotension ? ) \par \par \par 12/2/21: patient returns for follow up for DARRIN secondary  obscure GI bleeding  and CKD. \par She is feeling better today. We will continue  Venofer infusion weekly X 3 and Procrit.\par Close monitor CBC on weekly basis with possible blood transfusion if required. \par Patient reports feeling better, she denies CP, SOB. She is sometimes experienced  hematochezia secondary to chronica anal fissure and constipation.\par \par 2/14/22- Patient is here for follow up visit for management of DARRIN. Hgb is 6.7  3 days only after last transfusion , she reports several episodes of bleeding presumably from hemorrhoids and is using topical medications , Ferritin levels remain low despite venofer . She complains of weakness, bilateral leg edema . no chest pain or SOB . \par 3/24/22;\par Patient is here for follow up visit for management of DARRIN. Hgb is 6.7  3 days only after last transfusion , she reports several episodes of bleeding presumably from hemorrhoids and is using topical medications , Ferritin levels remain low despite venofer . She complains of weakness, bilateral leg edema . no chest pain or SOB .  [de-identified] : bruce is a 74 year old white female with hypertension, chronic kidney disease, morbidly obese presents today with normocytic anemia. \par Her most recent CBC 04/01/2020 shows WBC -5.66, HB of 6.7, MCV -86.3, RDW - 17.3, platelet count- 196. Her Hb was normal until 08/2019. In October 2019 she noticed black tarry stools and was feeling tired. She went to ER and her Hb was 5.3. She was given 3 units of PRBC. Her iron studies at that showed ferritin of 8 and percent saturation of 5. She had EGD and colonoscopy which did not reveal any bleeding lesions. Following that event as per patient she was not given iron (?not sure why). She was readmitted to hospital in 01/2020 for SOB on exertion and her HB was noted to 6.0 again. She was given 2 units and her iron studies were consistent with DARRIN. B12 and folate were normal. Immunofixation showed weak IgG lambda migrating para protein. Free light chain ratio 2.1. Normal calcium. \par EGD and VCE was done. EGD revealed gastritis and VCE showed bleeding in small bowel. \par She was started on oral iron and she took for about three months. \par Her latest ferritin done in feb 2020 was 24 and percent saturation was 72% and her hb improved to 8.8. She also has push enteroscopy in 02/2020 and no bleeding lesions were found. Was recommended to have repeat colonoscopy and double balloon enteroscopy if she bleeds again.\par She went for blood work again on 04/01/2020 as she was feeling weak and having SOB and her hb dropped to 6.7. She was told by PMD to start on PROCRIT 10,000 units M-W-F. She was told her iron levels are good and she can stop iron. \par \par Today she feels very tired and her SOB is worse. She denies any melena or BRBPR in last few days. She does have anal fissure and hemorrhoids and bleeds when she is constipated. She denies hematuria or post menopausal bleeding. Denies weight loss. Does not take any NSAIDs or aspirin. She has not followed up with nephrology before.\par Family history is significant for breast cancer in her mother. She is a former smoker stopped 20 years ago.\par She is uptodate with mammogram and Pap smear. \par

## 2022-04-25 NOTE — ASSESSMENT
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 ) , low ferritin . \par peripheral edema . \par Constipation , rectal bleeding ( hemorrhoids ) \par \par \par Plan : Today's Hgb 6.8\par will transfuse 2 units in AM on 3/25/22 , resume venofer weekly . \par Continue Follow up with GI. \par Patient will continue Receiving   Retacrit injection at the nursing home.\par Patient seen and blood work reviewed by  who agreed for the above plan of care. \par

## 2022-04-25 NOTE — HISTORY OF PRESENT ILLNESS
[de-identified] : 09/17/2020 Patient returns for follow up , she feels better after venofer X 4 and on EPO 10,000 weekly , Hb is up to 10 . \par \par 10/15/2020 Patient returns for follow up 2 weeks after last dose of EPO , today's Hb is 10.7 . she offers no new complaints . \par \par 04/08/2021 Patient returns for follow up for anemia on EPO and iron replacement . Hb is 10.8 . she complains of mild left arm pain after she started to self check her BP . ahe meds were recently adjusted by her PMD . \par \par 08/31/2021 patient returns for follow up , she offers no new complaints , her Hgb is slowly trending down after last transfusion and venofer X1 for ferritin : 35 . \par \par 11/18/2021 patient returns for follow up complaining of weakness and dyspnea on minimal exertion , still with lower extremity edema R > L . Hgb is 7.1 2 weeks after transfusion and despite venofer and EPO . She denies melena or hematochezia . Of note she had suspected small bowel bleeding on enteroscopy or capsule endoscopy and was intolerant to push enteroscopy ( hypotension ? ) \par \par \par 12/2/21: patient returns for follow up for DARRIN secondary  obscure GI bleeding  and CKD. \par She is feeling better today. We will continue  Venofer infusion weekly X 3 and Procrit.\par Close monitor CBC on weekly basis with possible blood transfusion if required. \par Patient reports feeling better, she denies CP, SOB. She is sometimes experienced  hematochezia secondary to chronica anal fissure and constipation.\par \par 2/14/22- Patient is here for follow up visit for management of DARRIN. Hgb is 6.7  3 days only after last transfusion , she reports several episodes of bleeding presumably from hemorrhoids and is using topical medications , Ferritin levels remain low despite venofer . She complains of weakness, bilateral leg edema . no chest pain or SOB . \par 3/24/22;\par Patient is here for follow up visit for management of DARRIN. Hgb is 6.7  3 days only after last transfusion , she reports several episodes of bleeding presumably from hemorrhoids and is using topical medications , Ferritin levels remain low despite venofer . She complains of weakness, bilateral leg edema . no chest pain or SOB .  [de-identified] : bruce is a 74 year old white female with hypertension, chronic kidney disease, morbidly obese presents today with normocytic anemia. \par Her most recent CBC 04/01/2020 shows WBC -5.66, HB of 6.7, MCV -86.3, RDW - 17.3, platelet count- 196. Her Hb was normal until 08/2019. In October 2019 she noticed black tarry stools and was feeling tired. She went to ER and her Hb was 5.3. She was given 3 units of PRBC. Her iron studies at that showed ferritin of 8 and percent saturation of 5. She had EGD and colonoscopy which did not reveal any bleeding lesions. Following that event as per patient she was not given iron (?not sure why). She was readmitted to hospital in 01/2020 for SOB on exertion and her HB was noted to 6.0 again. She was given 2 units and her iron studies were consistent with DARRIN. B12 and folate were normal. Immunofixation showed weak IgG lambda migrating para protein. Free light chain ratio 2.1. Normal calcium. \par EGD and VCE was done. EGD revealed gastritis and VCE showed bleeding in small bowel. \par She was started on oral iron and she took for about three months. \par Her latest ferritin done in feb 2020 was 24 and percent saturation was 72% and her hb improved to 8.8. She also has push enteroscopy in 02/2020 and no bleeding lesions were found. Was recommended to have repeat colonoscopy and double balloon enteroscopy if she bleeds again.\par She went for blood work again on 04/01/2020 as she was feeling weak and having SOB and her hb dropped to 6.7. She was told by PMD to start on PROCRIT 10,000 units M-W-F. She was told her iron levels are good and she can stop iron. \par \par Today she feels very tired and her SOB is worse. She denies any melena or BRBPR in last few days. She does have anal fissure and hemorrhoids and bleeds when she is constipated. She denies hematuria or post menopausal bleeding. Denies weight loss. Does not take any NSAIDs or aspirin. She has not followed up with nephrology before.\par Family history is significant for breast cancer in her mother. She is a former smoker stopped 20 years ago.\par She is uptodate with mammogram and Pap smear. \par

## 2022-04-28 ENCOUNTER — APPOINTMENT (OUTPATIENT)
Dept: HEMATOLOGY ONCOLOGY | Facility: CLINIC | Age: 77
End: 2022-04-28
Payer: MEDICARE

## 2022-04-28 ENCOUNTER — LABORATORY RESULT (OUTPATIENT)
Age: 77
End: 2022-04-28

## 2022-04-28 PROCEDURE — 99212 OFFICE O/P EST SF 10 MIN: CPT

## 2022-04-29 LAB
HCT VFR BLD CALC: 25.2 %
HGB BLD-MCNC: 7.9 G/DL
MCHC RBC-ENTMCNC: 30.7 PG
MCHC RBC-ENTMCNC: 31.3 G/DL
MCV RBC AUTO: 98.1 FL
PLATELET # BLD AUTO: 144 K/UL
PMV BLD: 9.4 FL
RBC # BLD: 2.57 M/UL
RBC # FLD: 14.6 %
WBC # FLD AUTO: 4.47 K/UL

## 2022-05-02 ENCOUNTER — APPOINTMENT (OUTPATIENT)
Dept: INFUSION THERAPY | Facility: CLINIC | Age: 77
End: 2022-05-02

## 2022-05-05 ENCOUNTER — APPOINTMENT (OUTPATIENT)
Dept: INFUSION THERAPY | Facility: CLINIC | Age: 77
End: 2022-05-05

## 2022-05-05 ENCOUNTER — LABORATORY RESULT (OUTPATIENT)
Age: 77
End: 2022-05-05

## 2022-05-05 LAB
HCT VFR BLD CALC: 22.1 %
HGB BLD-MCNC: 7.2 G/DL
MCHC RBC-ENTMCNC: 30.6 PG
MCHC RBC-ENTMCNC: 32.6 G/DL
MCV RBC AUTO: 94 FL
PLATELET # BLD AUTO: 135 K/UL
PMV BLD: 10.2 FL
RBC # BLD: 2.35 M/UL
RBC # FLD: 13.7 %
WBC # FLD AUTO: 5.32 K/UL

## 2022-05-05 RX ORDER — ACETAMINOPHEN 500 MG
650 TABLET ORAL ONCE
Refills: 0 | Status: COMPLETED | OUTPATIENT
Start: 2022-05-05 | End: 2022-05-05

## 2022-05-05 RX ORDER — IRON SUCROSE 20 MG/ML
200 INJECTION, SOLUTION INTRAVENOUS ONCE
Refills: 0 | Status: COMPLETED | OUTPATIENT
Start: 2022-05-05 | End: 2022-05-05

## 2022-05-05 RX ORDER — HYDROCORTISONE 20 MG
100 TABLET ORAL ONCE
Refills: 0 | Status: COMPLETED | OUTPATIENT
Start: 2022-05-05 | End: 2022-05-05

## 2022-05-05 RX ADMIN — IRON SUCROSE 200 MILLIGRAM(S): 20 INJECTION, SOLUTION INTRAVENOUS at 14:00

## 2022-05-05 RX ADMIN — Medication 650 MILLIGRAM(S): at 13:08

## 2022-05-05 RX ADMIN — Medication 100 MILLIGRAM(S): at 13:01

## 2022-05-05 RX ADMIN — IRON SUCROSE 220 MILLIGRAM(S): 20 INJECTION, SOLUTION INTRAVENOUS at 13:20

## 2022-05-05 RX ADMIN — Medication 100 MILLIGRAM(S): at 13:20

## 2022-05-05 RX ADMIN — Medication 650 MILLIGRAM(S): at 13:00

## 2022-05-06 ENCOUNTER — APPOINTMENT (OUTPATIENT)
Dept: INFUSION THERAPY | Facility: CLINIC | Age: 77
End: 2022-05-06

## 2022-05-06 LAB
ABO + RH PNL BLD: NORMAL
BLD GP AB SCN SERPL QL: NORMAL
FERRITIN SERPL-MCNC: 89 NG/ML

## 2022-05-06 RX ORDER — ACETAMINOPHEN 500 MG
650 TABLET ORAL ONCE
Refills: 0 | Status: COMPLETED | OUTPATIENT
Start: 2022-05-06 | End: 2022-06-17

## 2022-05-06 RX ORDER — ERYTHROPOIETIN 10000 [IU]/ML
20000 INJECTION, SOLUTION INTRAVENOUS; SUBCUTANEOUS ONCE
Refills: 0 | Status: COMPLETED | OUTPATIENT
Start: 2022-05-06 | End: 2022-05-06

## 2022-05-06 RX ORDER — HYDROCORTISONE 20 MG
100 TABLET ORAL ONCE
Refills: 0 | Status: COMPLETED | OUTPATIENT
Start: 2022-05-06 | End: 2022-05-06

## 2022-05-06 RX ADMIN — Medication 650 MILLIGRAM(S): at 15:47

## 2022-05-06 RX ADMIN — Medication 100 MILLIGRAM(S): at 13:54

## 2022-05-06 RX ADMIN — Medication 100 MILLIGRAM(S): at 14:14

## 2022-05-06 RX ADMIN — ERYTHROPOIETIN 20000 UNIT(S): 10000 INJECTION, SOLUTION INTRAVENOUS; SUBCUTANEOUS at 14:48

## 2022-05-06 RX ADMIN — Medication 650 MILLIGRAM(S): at 13:53

## 2022-05-11 ENCOUNTER — APPOINTMENT (OUTPATIENT)
Dept: HEMATOLOGY ONCOLOGY | Facility: CLINIC | Age: 77
End: 2022-05-11

## 2022-05-11 ENCOUNTER — APPOINTMENT (OUTPATIENT)
Dept: INFUSION THERAPY | Facility: CLINIC | Age: 77
End: 2022-05-11

## 2022-05-11 ENCOUNTER — LABORATORY RESULT (OUTPATIENT)
Age: 77
End: 2022-05-11

## 2022-05-11 ENCOUNTER — OUTPATIENT (OUTPATIENT)
Dept: OUTPATIENT SERVICES | Facility: HOSPITAL | Age: 77
LOS: 1 days | Discharge: HOME | End: 2022-05-11

## 2022-05-11 DIAGNOSIS — Z87.19 PERSONAL HISTORY OF OTHER DISEASES OF THE DIGESTIVE SYSTEM: Chronic | ICD-10-CM

## 2022-05-11 DIAGNOSIS — E61.1 IRON DEFICIENCY: ICD-10-CM

## 2022-05-11 DIAGNOSIS — D64.9 ANEMIA, UNSPECIFIED: ICD-10-CM

## 2022-05-11 LAB
HCT VFR BLD CALC: 24.6 %
HGB BLD-MCNC: 7.8 G/DL
MCHC RBC-ENTMCNC: 30.6 PG
MCHC RBC-ENTMCNC: 31.7 G/DL
MCV RBC AUTO: 96.5 FL
PLATELET # BLD AUTO: 117 K/UL
PMV BLD: 9.3 FL
RBC # BLD: 2.55 M/UL
RBC # FLD: 14.8 %
WBC # FLD AUTO: 4.51 K/UL

## 2022-05-11 RX ORDER — ERYTHROPOIETIN 10000 [IU]/ML
20000 INJECTION, SOLUTION INTRAVENOUS; SUBCUTANEOUS ONCE
Refills: 0 | Status: COMPLETED | OUTPATIENT
Start: 2022-05-11 | End: 2022-05-11

## 2022-05-11 RX ADMIN — ERYTHROPOIETIN 20000 UNIT(S): 10000 INJECTION, SOLUTION INTRAVENOUS; SUBCUTANEOUS at 14:43

## 2022-05-19 ENCOUNTER — APPOINTMENT (OUTPATIENT)
Dept: HEMATOLOGY ONCOLOGY | Facility: CLINIC | Age: 77
End: 2022-05-19
Payer: MEDICARE

## 2022-05-19 ENCOUNTER — LABORATORY RESULT (OUTPATIENT)
Age: 77
End: 2022-05-19

## 2022-05-19 ENCOUNTER — APPOINTMENT (OUTPATIENT)
Dept: INFUSION THERAPY | Facility: CLINIC | Age: 77
End: 2022-05-19
Payer: MEDICARE

## 2022-05-19 ENCOUNTER — APPOINTMENT (OUTPATIENT)
Dept: INFUSION THERAPY | Facility: CLINIC | Age: 77
End: 2022-05-19

## 2022-05-19 VITALS
DIASTOLIC BLOOD PRESSURE: 51 MMHG | WEIGHT: 234 LBS | HEIGHT: 66 IN | RESPIRATION RATE: 16 BRPM | BODY MASS INDEX: 37.61 KG/M2 | OXYGEN SATURATION: 98 % | SYSTOLIC BLOOD PRESSURE: 121 MMHG | HEART RATE: 66 BPM | TEMPERATURE: 97.1 F

## 2022-05-19 DIAGNOSIS — R59.9 ENLARGED LYMPH NODES, UNSPECIFIED: ICD-10-CM

## 2022-05-19 PROCEDURE — 99214 OFFICE O/P EST MOD 30 MIN: CPT

## 2022-05-19 RX ORDER — IRON SUCROSE 20 MG/ML
200 INJECTION, SOLUTION INTRAVENOUS ONCE
Refills: 0 | Status: COMPLETED | OUTPATIENT
Start: 2022-05-19 | End: 2022-05-19

## 2022-05-19 RX ORDER — ERYTHROPOIETIN 10000 [IU]/ML
30000 INJECTION, SOLUTION INTRAVENOUS; SUBCUTANEOUS ONCE
Refills: 0 | Status: COMPLETED | OUTPATIENT
Start: 2022-05-19 | End: 2022-05-19

## 2022-05-19 RX ORDER — HYDROCORTISONE 20 MG
100 TABLET ORAL ONCE
Refills: 0 | Status: COMPLETED | OUTPATIENT
Start: 2022-05-19 | End: 2022-05-19

## 2022-05-19 RX ORDER — ACETAMINOPHEN 500 MG
650 TABLET ORAL ONCE
Refills: 0 | Status: COMPLETED | OUTPATIENT
Start: 2022-05-19 | End: 2022-05-19

## 2022-05-19 RX ADMIN — IRON SUCROSE 220 MILLIGRAM(S): 20 INJECTION, SOLUTION INTRAVENOUS at 16:20

## 2022-05-19 RX ADMIN — Medication 650 MILLIGRAM(S): at 16:30

## 2022-05-19 RX ADMIN — Medication 100 MILLIGRAM(S): at 16:00

## 2022-05-19 RX ADMIN — Medication 100 MILLIGRAM(S): at 16:20

## 2022-05-19 RX ADMIN — IRON SUCROSE 200 MILLIGRAM(S): 20 INJECTION, SOLUTION INTRAVENOUS at 16:50

## 2022-05-19 RX ADMIN — Medication 650 MILLIGRAM(S): at 16:05

## 2022-05-19 RX ADMIN — ERYTHROPOIETIN 30000 UNIT(S): 10000 INJECTION, SOLUTION INTRAVENOUS; SUBCUTANEOUS at 16:05

## 2022-05-19 NOTE — PHYSICAL EXAM
[Obese] : obese [Normal] : clear to auscultation bilaterally, no dullness, no wheezing [de-identified] : pale in no acute distress.  [de-identified] : grade 3/6 systolic murmur  [de-identified] : lower extremity edema ( R > L ) 1 to 2 plus .  [de-identified] : mild diffuse tenderness , no rebound

## 2022-05-19 NOTE — ASSESSMENT
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 ) , \par peripheral edema . \par Constipation , rectal bleeding ( hemorrhoids ? ) , not cleared for GI work up . \par Abdominal pain , weight loss . Prominent mesenteric lymph nodes.\par \par Plan : continue weekly venofer, increase EPO to 30,000 \par            PET scan ( unable to have contrast )

## 2022-05-19 NOTE — PHYSICAL EXAM
[Obese] : obese [Normal] : clear to auscultation bilaterally, no dullness, no wheezing [de-identified] : pale in no acute distress.  [de-identified] : grade 3/6 systolic murmur  [de-identified] : lower extremity edema ( R > L ) 1 to 2 plus .  [de-identified] : mild diffuse tenderness , no rebound

## 2022-05-20 ENCOUNTER — APPOINTMENT (OUTPATIENT)
Dept: INFUSION THERAPY | Facility: CLINIC | Age: 77
End: 2022-05-20

## 2022-05-20 ENCOUNTER — APPOINTMENT (OUTPATIENT)
Dept: HEMATOLOGY ONCOLOGY | Facility: CLINIC | Age: 77
End: 2022-05-20

## 2022-05-20 VITALS
HEART RATE: 68 BPM | RESPIRATION RATE: 16 BRPM | SYSTOLIC BLOOD PRESSURE: 114 MMHG | DIASTOLIC BLOOD PRESSURE: 53 MMHG | TEMPERATURE: 97.2 F

## 2022-05-20 VITALS
RESPIRATION RATE: 16 BRPM | HEART RATE: 75 BPM | SYSTOLIC BLOOD PRESSURE: 131 MMHG | DIASTOLIC BLOOD PRESSURE: 58 MMHG | TEMPERATURE: 97.4 F

## 2022-05-20 VITALS
DIASTOLIC BLOOD PRESSURE: 58 MMHG | TEMPERATURE: 97.4 F | RESPIRATION RATE: 16 BRPM | HEART RATE: 75 BPM | SYSTOLIC BLOOD PRESSURE: 131 MMHG

## 2022-05-20 VITALS
TEMPERATURE: 97.5 F | HEART RATE: 64 BPM | DIASTOLIC BLOOD PRESSURE: 49 MMHG | RESPIRATION RATE: 15 BRPM | SYSTOLIC BLOOD PRESSURE: 108 MMHG

## 2022-05-20 LAB
ABO + RH PNL BLD: NORMAL
ALBUMIN SERPL ELPH-MCNC: 3.9 G/DL
ALP BLD-CCNC: 71 U/L
ALT SERPL-CCNC: 7 U/L
ANION GAP SERPL CALC-SCNC: 14 MMOL/L
AST SERPL-CCNC: 16 U/L
BILIRUB SERPL-MCNC: 0.6 MG/DL
BLD GP AB SCN SERPL QL: NORMAL
BUN SERPL-MCNC: 58 MG/DL
CALCIUM SERPL-MCNC: 9 MG/DL
CHLORIDE SERPL-SCNC: 108 MMOL/L
CO2 SERPL-SCNC: 21 MMOL/L
CREAT SERPL-MCNC: 2.9 MG/DL
EGFR: 16 ML/MIN/1.73M2
FERRITIN SERPL-MCNC: 68 NG/ML
GLUCOSE SERPL-MCNC: 84 MG/DL
HCT VFR BLD CALC: 23.2 %
HGB BLD-MCNC: 7.2 G/DL
MCHC RBC-ENTMCNC: 28.9 PG
MCHC RBC-ENTMCNC: 31 G/DL
MCV RBC AUTO: 93.2 FL
PLATELET # BLD AUTO: 132 K/UL
PMV BLD: 10.6 FL
POTASSIUM SERPL-SCNC: 4.4 MMOL/L
PROT SERPL-MCNC: 7 G/DL
RBC # BLD: 2.49 M/UL
RBC # FLD: 13.9 %
SODIUM SERPL-SCNC: 143 MMOL/L
WBC # FLD AUTO: 4.47 K/UL

## 2022-05-20 RX ORDER — HYDROCORTISONE 20 MG
100 TABLET ORAL ONCE
Refills: 0 | Status: COMPLETED | OUTPATIENT
Start: 2022-05-20 | End: 2022-05-20

## 2022-05-20 RX ORDER — ACETAMINOPHEN 500 MG
650 TABLET ORAL ONCE
Refills: 0 | Status: COMPLETED | OUTPATIENT
Start: 2022-05-20 | End: 2022-05-20

## 2022-05-20 RX ADMIN — Medication 100 MILLIGRAM(S): at 12:40

## 2022-05-20 RX ADMIN — Medication 650 MILLIGRAM(S): at 14:09

## 2022-05-20 RX ADMIN — Medication 650 MILLIGRAM(S): at 12:27

## 2022-05-20 RX ADMIN — Medication 100 MILLIGRAM(S): at 12:27

## 2022-05-21 NOTE — HISTORY OF PRESENT ILLNESS
[de-identified] : bruce is a 74 year old white female with hypertension, chronic kidney disease, morbidly obese presents today with normocytic anemia. \par Her most recent CBC 04/01/2020 shows WBC -5.66, HB of 6.7, MCV -86.3, RDW - 17.3, platelet count- 196. Her Hb was normal until 08/2019. In October 2019 she noticed black tarry stools and was feeling tired. She went to ER and her Hb was 5.3. She was given 3 units of PRBC. Her iron studies at that showed ferritin of 8 and percent saturation of 5. She had EGD and colonoscopy which did not reveal any bleeding lesions. Following that event as per patient she was not given iron (?not sure why). She was readmitted to hospital in 01/2020 for SOB on exertion and her HB was noted to 6.0 again. She was given 2 units and her iron studies were consistent with DARRIN. B12 and folate were normal. Immunofixation showed weak IgG lambda migrating para protein. Free light chain ratio 2.1. Normal calcium. \par EGD and VCE was done. EGD revealed gastritis and VCE showed bleeding in small bowel. \par She was started on oral iron and she took for about three months. \par Her latest ferritin done in feb 2020 was 24 and percent saturation was 72% and her hb improved to 8.8. She also has push enteroscopy in 02/2020 and no bleeding lesions were found. Was recommended to have repeat colonoscopy and double balloon enteroscopy if she bleeds again.\par She went for blood work again on 04/01/2020 as she was feeling weak and having SOB and her hb dropped to 6.7. She was told by PMD to start on PROCRIT 10,000 units M-W-F. She was told her iron levels are good and she can stop iron. \par \par Today she feels very tired and her SOB is worse. She denies any melena or BRBPR in last few days. She does have anal fissure and hemorrhoids and bleeds when she is constipated. She denies hematuria or post menopausal bleeding. Denies weight loss. Does not take any NSAIDs or aspirin. She has not followed up with nephrology before.\par Family history is significant for breast cancer in her mother. She is a former smoker stopped 20 years ago.\par She is uptodate with mammogram and Pap smear. \par  [de-identified] : 09/17/2020 Patient returns for follow up , she feels better after venofer X 4 and on EPO 10,000 weekly , Hb is up to 10 . \par \par 10/15/2020 Patient returns for follow up 2 weeks after last dose of EPO , today's Hb is 10.7 . she offers no new complaints . \par \par 04/08/2021 Patient returns for follow up for anemia on EPO and iron replacement . Hb is 10.8 . she complains of mild left arm pain after she started to self check her BP . ahe meds were recently adjusted by her PMD . \par \par 08/31/2021 patient returns for follow up , she offers no new complaints , her Hgb is slowly trending down after last transfusion and venofer X1 for ferritin : 35 . \par \par 11/18/2021 patient returns for follow up complaining of weakness and dyspnea on minimal exertion , still with lower extremity edema R > L . Hgb is 7.1 2 weeks after transfusion and despite venofer and EPO . She denies melena or hematochezia . Of note she had suspected small bowel bleeding on enteroscopy or capsule endoscopy and was intolerant to push enteroscopy ( hypotension ? ) \par \par \par 12/2/21: patient returns for follow up for DARRIN secondary  obscure GI bleeding  and CKD. \par She is feeling better today. We will continue  Venofer infusion weekly X 3 and Procrit.\par Close monitor CBC on weekly basis with possible blood transfusion if required. \par Patient reports feeling better, she denies CP, SOB. She is sometimes experienced  hematochezia secondary to chronica anal fissure and constipation.\par \par 2/14/22- Patient is here for follow up visit for management of DARRIN. Hgb is 6.7  3 days only after last transfusion , she reports several episodes of bleeding presumably from hemorrhoids and is using topical medications , Ferritin levels remain low despite venofer . She complains of weakness, bilateral leg edema . no chest pain or SOB . \par

## 2022-05-21 NOTE — HISTORY OF PRESENT ILLNESS
[de-identified] : bruce is a 74 year old white female with hypertension, chronic kidney disease, morbidly obese presents today with normocytic anemia. \par Her most recent CBC 04/01/2020 shows WBC -5.66, HB of 6.7, MCV -86.3, RDW - 17.3, platelet count- 196. Her Hb was normal until 08/2019. In October 2019 she noticed black tarry stools and was feeling tired. She went to ER and her Hb was 5.3. She was given 3 units of PRBC. Her iron studies at that showed ferritin of 8 and percent saturation of 5. She had EGD and colonoscopy which did not reveal any bleeding lesions. Following that event as per patient she was not given iron (?not sure why). She was readmitted to hospital in 01/2020 for SOB on exertion and her HB was noted to 6.0 again. She was given 2 units and her iron studies were consistent with DARRIN. B12 and folate were normal. Immunofixation showed weak IgG lambda migrating para protein. Free light chain ratio 2.1. Normal calcium. \par EGD and VCE was done. EGD revealed gastritis and VCE showed bleeding in small bowel. \par She was started on oral iron and she took for about three months. \par Her latest ferritin done in feb 2020 was 24 and percent saturation was 72% and her hb improved to 8.8. She also has push enteroscopy in 02/2020 and no bleeding lesions were found. Was recommended to have repeat colonoscopy and double balloon enteroscopy if she bleeds again.\par She went for blood work again on 04/01/2020 as she was feeling weak and having SOB and her hb dropped to 6.7. She was told by PMD to start on PROCRIT 10,000 units M-W-F. She was told her iron levels are good and she can stop iron. \par \par Today she feels very tired and her SOB is worse. She denies any melena or BRBPR in last few days. She does have anal fissure and hemorrhoids and bleeds when she is constipated. She denies hematuria or post menopausal bleeding. Denies weight loss. Does not take any NSAIDs or aspirin. She has not followed up with nephrology before.\par Family history is significant for breast cancer in her mother. She is a former smoker stopped 20 years ago.\par She is uptodate with mammogram and Pap smear. \par  [de-identified] : 09/17/2020 Patient returns for follow up , she feels better after venofer X 4 and on EPO 10,000 weekly , Hb is up to 10 . \par \par 10/15/2020 Patient returns for follow up 2 weeks after last dose of EPO , today's Hb is 10.7 . she offers no new complaints . \par \par 04/08/2021 Patient returns for follow up for anemia on EPO and iron replacement . Hb is 10.8 . she complains of mild left arm pain after she started to self check her BP . ahe meds were recently adjusted by her PMD . \par \par 08/31/2021 patient returns for follow up , she offers no new complaints , her Hgb is slowly trending down after last transfusion and venofer X1 for ferritin : 35 . \par \par 11/18/2021 patient returns for follow up complaining of weakness and dyspnea on minimal exertion , still with lower extremity edema R > L . Hgb is 7.1 2 weeks after transfusion and despite venofer and EPO . She denies melena or hematochezia . Of note she had suspected small bowel bleeding on enteroscopy or capsule endoscopy and was intolerant to push enteroscopy ( hypotension ? ) \par \par \par 12/2/21: patient returns for follow up for DARRIN secondary  obscure GI bleeding  and CKD. \par She is feeling better today. We will continue  Venofer infusion weekly X 3 and Procrit.\par Close monitor CBC on weekly basis with possible blood transfusion if required. \par Patient reports feeling better, she denies CP, SOB. She is sometimes experienced  hematochezia secondary to chronica anal fissure and constipation.\par \par 2/14/22- Patient is here for follow up visit for management of DARRIN. Hgb is 6.7  3 days only after last transfusion , she reports several episodes of bleeding presumably from hemorrhoids and is using topical medications , Ferritin levels remain low despite venofer . She complains of weakness, bilateral leg edema . no chest pain or SOB . \par

## 2022-05-23 ENCOUNTER — APPOINTMENT (OUTPATIENT)
Dept: CARDIOLOGY | Facility: CLINIC | Age: 77
End: 2022-05-23

## 2022-05-25 ENCOUNTER — APPOINTMENT (OUTPATIENT)
Dept: HEMATOLOGY ONCOLOGY | Facility: CLINIC | Age: 77
End: 2022-05-25

## 2022-05-27 ENCOUNTER — APPOINTMENT (OUTPATIENT)
Dept: INFUSION THERAPY | Facility: CLINIC | Age: 77
End: 2022-05-27

## 2022-05-27 ENCOUNTER — LABORATORY RESULT (OUTPATIENT)
Age: 77
End: 2022-05-27

## 2022-05-27 VITALS
HEART RATE: 65 BPM | TEMPERATURE: 97.2 F | SYSTOLIC BLOOD PRESSURE: 118 MMHG | RESPIRATION RATE: 16 BRPM | DIASTOLIC BLOOD PRESSURE: 59 MMHG

## 2022-05-27 LAB
HCT VFR BLD CALC: 23.5 %
HGB BLD-MCNC: 7.5 G/DL
MCHC RBC-ENTMCNC: 29.5 PG
MCHC RBC-ENTMCNC: 31.9 G/DL
MCV RBC AUTO: 92.5 FL
PLATELET # BLD AUTO: 117 K/UL
PMV BLD: 10.1 FL
RBC # BLD: 2.54 M/UL
RBC # FLD: 15.7 %
WBC # FLD AUTO: 4.03 K/UL

## 2022-05-27 RX ORDER — ERYTHROPOIETIN 10000 [IU]/ML
30000 INJECTION, SOLUTION INTRAVENOUS; SUBCUTANEOUS ONCE
Refills: 0 | Status: COMPLETED | OUTPATIENT
Start: 2022-05-27 | End: 2022-05-27

## 2022-05-27 RX ORDER — HYDROCORTISONE 20 MG
100 TABLET ORAL ONCE
Refills: 0 | Status: COMPLETED | OUTPATIENT
Start: 2022-05-27 | End: 2022-05-27

## 2022-05-27 RX ORDER — IRON SUCROSE 20 MG/ML
200 INJECTION, SOLUTION INTRAVENOUS ONCE
Refills: 0 | Status: COMPLETED | OUTPATIENT
Start: 2022-05-27 | End: 2022-05-27

## 2022-05-27 RX ORDER — ACETAMINOPHEN 500 MG
650 TABLET ORAL ONCE
Refills: 0 | Status: COMPLETED | OUTPATIENT
Start: 2022-05-27 | End: 2022-07-01

## 2022-05-27 RX ADMIN — IRON SUCROSE 220 MILLIGRAM(S): 20 INJECTION, SOLUTION INTRAVENOUS at 14:15

## 2022-05-27 RX ADMIN — ERYTHROPOIETIN 30000 UNIT(S): 10000 INJECTION, SOLUTION INTRAVENOUS; SUBCUTANEOUS at 13:41

## 2022-05-27 RX ADMIN — IRON SUCROSE 200 MILLIGRAM(S): 20 INJECTION, SOLUTION INTRAVENOUS at 14:45

## 2022-05-27 RX ADMIN — Medication 100 MILLIGRAM(S): at 13:44

## 2022-05-27 RX ADMIN — Medication 100 MILLIGRAM(S): at 14:10

## 2022-06-01 ENCOUNTER — APPOINTMENT (OUTPATIENT)
Dept: INFUSION THERAPY | Facility: CLINIC | Age: 77
End: 2022-06-01

## 2022-06-01 ENCOUNTER — LABORATORY RESULT (OUTPATIENT)
Age: 77
End: 2022-06-01

## 2022-06-01 VITALS
DIASTOLIC BLOOD PRESSURE: 46 MMHG | RESPIRATION RATE: 16 BRPM | HEART RATE: 70 BPM | TEMPERATURE: 97.6 F | SYSTOLIC BLOOD PRESSURE: 130 MMHG

## 2022-06-01 LAB
ABO + RH PNL BLD: NORMAL
BLD GP AB SCN SERPL QL: NORMAL
HCT VFR BLD CALC: 22.3 %
HGB BLD-MCNC: 7.1 G/DL
MCHC RBC-ENTMCNC: 29.8 PG
MCHC RBC-ENTMCNC: 31.8 G/DL
MCV RBC AUTO: 93.7 FL
PLATELET # BLD AUTO: 151 K/UL
PMV BLD: 10.2 FL
RBC # BLD: 2.38 M/UL
RBC # FLD: 16.6 %
WBC # FLD AUTO: 6.2 K/UL

## 2022-06-01 RX ORDER — ACETAMINOPHEN 500 MG
650 TABLET ORAL ONCE
Refills: 0 | Status: COMPLETED | OUTPATIENT
Start: 2022-06-01 | End: 2022-06-01

## 2022-06-01 RX ORDER — IRON SUCROSE 20 MG/ML
200 INJECTION, SOLUTION INTRAVENOUS ONCE
Refills: 0 | Status: COMPLETED | OUTPATIENT
Start: 2022-06-01 | End: 2022-06-01

## 2022-06-01 RX ORDER — ERYTHROPOIETIN 10000 [IU]/ML
30000 INJECTION, SOLUTION INTRAVENOUS; SUBCUTANEOUS ONCE
Refills: 0 | Status: COMPLETED | OUTPATIENT
Start: 2022-06-01 | End: 2022-06-01

## 2022-06-01 RX ORDER — HYDROCORTISONE 20 MG
100 TABLET ORAL ONCE
Refills: 0 | Status: COMPLETED | OUTPATIENT
Start: 2022-06-01 | End: 2022-06-01

## 2022-06-01 RX ADMIN — Medication 650 MILLIGRAM(S): at 14:30

## 2022-06-01 RX ADMIN — Medication 650 MILLIGRAM(S): at 13:52

## 2022-06-01 RX ADMIN — ERYTHROPOIETIN 30000 UNIT(S): 10000 INJECTION, SOLUTION INTRAVENOUS; SUBCUTANEOUS at 13:56

## 2022-06-01 RX ADMIN — Medication 650 MILLIGRAM(S): at 13:50

## 2022-06-01 RX ADMIN — IRON SUCROSE 220 MILLIGRAM(S): 20 INJECTION, SOLUTION INTRAVENOUS at 14:20

## 2022-06-01 RX ADMIN — Medication 100 MILLIGRAM(S): at 14:15

## 2022-06-01 RX ADMIN — IRON SUCROSE 200 MILLIGRAM(S): 20 INJECTION, SOLUTION INTRAVENOUS at 14:50

## 2022-06-01 RX ADMIN — Medication 100 MILLIGRAM(S): at 13:51

## 2022-06-02 LAB — FERRITIN SERPL-MCNC: 127 NG/ML

## 2022-06-03 ENCOUNTER — APPOINTMENT (OUTPATIENT)
Dept: INFUSION THERAPY | Facility: CLINIC | Age: 77
End: 2022-06-03

## 2022-06-03 RX ORDER — HYDROCORTISONE 20 MG
100 TABLET ORAL ONCE
Refills: 0 | Status: COMPLETED | OUTPATIENT
Start: 2022-06-03 | End: 2022-06-03

## 2022-06-03 RX ORDER — ACETAMINOPHEN 500 MG
650 TABLET ORAL ONCE
Refills: 0 | Status: COMPLETED | OUTPATIENT
Start: 2022-06-03 | End: 2022-06-03

## 2022-06-03 RX ADMIN — Medication 100 MILLIGRAM(S): at 14:54

## 2022-06-03 RX ADMIN — Medication 650 MILLIGRAM(S): at 14:55

## 2022-06-08 ENCOUNTER — LABORATORY RESULT (OUTPATIENT)
Age: 77
End: 2022-06-08

## 2022-06-08 ENCOUNTER — APPOINTMENT (OUTPATIENT)
Dept: INFUSION THERAPY | Facility: CLINIC | Age: 77
End: 2022-06-08

## 2022-06-08 LAB
HCT VFR BLD CALC: 26 %
HGB BLD-MCNC: 8.2 G/DL
MCHC RBC-ENTMCNC: 29.5 PG
MCHC RBC-ENTMCNC: 31.5 G/DL
MCV RBC AUTO: 93.5 FL
PLATELET # BLD AUTO: 109 K/UL
PMV BLD: 10.3 FL
RBC # BLD: 2.78 M/UL
RBC # FLD: 16.7 %
WBC # FLD AUTO: 4.61 K/UL

## 2022-06-08 RX ORDER — ACETAMINOPHEN 500 MG
650 TABLET ORAL ONCE
Refills: 0 | Status: COMPLETED | OUTPATIENT
Start: 2022-06-08 | End: 2022-06-08

## 2022-06-08 RX ORDER — IRON SUCROSE 20 MG/ML
200 INJECTION, SOLUTION INTRAVENOUS ONCE
Refills: 0 | Status: COMPLETED | OUTPATIENT
Start: 2022-06-08 | End: 2022-06-08

## 2022-06-08 RX ORDER — ERYTHROPOIETIN 10000 [IU]/ML
30000 INJECTION, SOLUTION INTRAVENOUS; SUBCUTANEOUS ONCE
Refills: 0 | Status: COMPLETED | OUTPATIENT
Start: 2022-06-08 | End: 2022-06-08

## 2022-06-08 RX ORDER — HYDROCORTISONE 20 MG
100 TABLET ORAL ONCE
Refills: 0 | Status: COMPLETED | OUTPATIENT
Start: 2022-06-08 | End: 2022-06-08

## 2022-06-08 RX ADMIN — Medication 100 MILLIGRAM(S): at 16:21

## 2022-06-08 RX ADMIN — Medication 650 MILLIGRAM(S): at 15:53

## 2022-06-08 RX ADMIN — IRON SUCROSE 200 MILLIGRAM(S): 20 INJECTION, SOLUTION INTRAVENOUS at 16:55

## 2022-06-08 RX ADMIN — IRON SUCROSE 220 MILLIGRAM(S): 20 INJECTION, SOLUTION INTRAVENOUS at 16:25

## 2022-06-08 RX ADMIN — Medication 100 MILLIGRAM(S): at 15:51

## 2022-06-08 RX ADMIN — ERYTHROPOIETIN 30000 UNIT(S): 10000 INJECTION, SOLUTION INTRAVENOUS; SUBCUTANEOUS at 15:53

## 2022-06-08 RX ADMIN — Medication 650 MILLIGRAM(S): at 16:15

## 2022-06-10 ENCOUNTER — APPOINTMENT (OUTPATIENT)
Dept: INFUSION THERAPY | Facility: CLINIC | Age: 77
End: 2022-06-10

## 2022-06-15 ENCOUNTER — APPOINTMENT (OUTPATIENT)
Dept: INFUSION THERAPY | Facility: CLINIC | Age: 77
End: 2022-06-15

## 2022-06-15 ENCOUNTER — LABORATORY RESULT (OUTPATIENT)
Age: 77
End: 2022-06-15

## 2022-06-15 LAB
ABO + RH PNL BLD: NORMAL
BLD GP AB SCN SERPL QL: NORMAL
HCT VFR BLD CALC: 25.3 %
HGB BLD-MCNC: 7.6 G/DL
MCHC RBC-ENTMCNC: 28.3 PG
MCHC RBC-ENTMCNC: 30 G/DL
MCV RBC AUTO: 94.1 FL
PLATELET # BLD AUTO: 122 K/UL
PMV BLD: 10 FL
RBC # BLD: 2.69 M/UL
RBC # FLD: 16.9 %
WBC # FLD AUTO: 3.68 K/UL

## 2022-06-15 RX ORDER — ERYTHROPOIETIN 10000 [IU]/ML
30000 INJECTION, SOLUTION INTRAVENOUS; SUBCUTANEOUS ONCE
Refills: 0 | Status: COMPLETED | OUTPATIENT
Start: 2022-06-15 | End: 2022-06-15

## 2022-06-15 RX ADMIN — ERYTHROPOIETIN 30000 UNIT(S): 10000 INJECTION, SOLUTION INTRAVENOUS; SUBCUTANEOUS at 15:29

## 2022-06-17 ENCOUNTER — APPOINTMENT (OUTPATIENT)
Dept: INFUSION THERAPY | Facility: CLINIC | Age: 77
End: 2022-06-17

## 2022-06-17 VITALS
SYSTOLIC BLOOD PRESSURE: 109 MMHG | HEART RATE: 63 BPM | RESPIRATION RATE: 16 BRPM | DIASTOLIC BLOOD PRESSURE: 48 MMHG | TEMPERATURE: 98 F

## 2022-06-17 VITALS
DIASTOLIC BLOOD PRESSURE: 53 MMHG | HEART RATE: 69 BPM | SYSTOLIC BLOOD PRESSURE: 112 MMHG | TEMPERATURE: 97.7 F | RESPIRATION RATE: 16 BRPM

## 2022-06-17 VITALS
TEMPERATURE: 97.9 F | DIASTOLIC BLOOD PRESSURE: 61 MMHG | SYSTOLIC BLOOD PRESSURE: 137 MMHG | HEART RATE: 67 BPM | RESPIRATION RATE: 18 BRPM

## 2022-06-17 RX ORDER — HYDROCORTISONE 20 MG
100 TABLET ORAL ONCE
Refills: 0 | Status: COMPLETED | OUTPATIENT
Start: 2022-06-17 | End: 2022-06-17

## 2022-06-17 RX ORDER — ACETAMINOPHEN 500 MG
650 TABLET ORAL ONCE
Refills: 0 | Status: COMPLETED | OUTPATIENT
Start: 2022-06-17 | End: 2022-06-17

## 2022-06-17 RX ADMIN — Medication 650 MILLIGRAM(S): at 13:25

## 2022-06-17 RX ADMIN — Medication 100 MILLIGRAM(S): at 14:18

## 2022-06-17 RX ADMIN — Medication 650 MILLIGRAM(S): at 14:19

## 2022-06-17 RX ADMIN — Medication 100 MILLIGRAM(S): at 14:40

## 2022-06-17 RX ADMIN — Medication 650 MILLIGRAM(S): at 14:00

## 2022-06-23 ENCOUNTER — APPOINTMENT (OUTPATIENT)
Dept: HEMATOLOGY ONCOLOGY | Facility: CLINIC | Age: 77
End: 2022-06-23

## 2022-06-23 ENCOUNTER — APPOINTMENT (OUTPATIENT)
Dept: INFUSION THERAPY | Facility: CLINIC | Age: 77
End: 2022-06-23

## 2022-06-23 ENCOUNTER — LABORATORY RESULT (OUTPATIENT)
Age: 77
End: 2022-06-23

## 2022-06-23 LAB
HCT VFR BLD CALC: 25.5 %
HGB BLD-MCNC: 7.6 G/DL
MCHC RBC-ENTMCNC: 27.2 PG
MCHC RBC-ENTMCNC: 29.8 G/DL
MCV RBC AUTO: 91.4 FL
PLATELET # BLD AUTO: 136 K/UL
PMV BLD: 9.3 FL
RBC # BLD: 2.79 M/UL
RBC # FLD: 16.7 %
WBC # FLD AUTO: 3.07 K/UL

## 2022-06-23 RX ORDER — ERYTHROPOIETIN 10000 [IU]/ML
30000 INJECTION, SOLUTION INTRAVENOUS; SUBCUTANEOUS ONCE
Refills: 0 | Status: COMPLETED | OUTPATIENT
Start: 2022-06-23 | End: 2022-06-23

## 2022-06-23 RX ORDER — IRON SUCROSE 20 MG/ML
200 INJECTION, SOLUTION INTRAVENOUS ONCE
Refills: 0 | Status: COMPLETED | OUTPATIENT
Start: 2022-06-23 | End: 2022-06-23

## 2022-06-23 RX ADMIN — IRON SUCROSE 220 MILLIGRAM(S): 20 INJECTION, SOLUTION INTRAVENOUS at 15:53

## 2022-06-23 RX ADMIN — ERYTHROPOIETIN 30000 UNIT(S): 10000 INJECTION, SOLUTION INTRAVENOUS; SUBCUTANEOUS at 15:53

## 2022-06-24 LAB — FERRITIN SERPL-MCNC: 60 NG/ML

## 2022-06-29 ENCOUNTER — APPOINTMENT (OUTPATIENT)
Dept: INFUSION THERAPY | Facility: CLINIC | Age: 77
End: 2022-06-29

## 2022-06-29 ENCOUNTER — LABORATORY RESULT (OUTPATIENT)
Age: 77
End: 2022-06-29

## 2022-06-29 LAB
ABO + RH PNL BLD: NORMAL
BLD GP AB SCN SERPL QL: NORMAL
HCT VFR BLD CALC: 22.9 %
HGB BLD-MCNC: 7 G/DL
MCHC RBC-ENTMCNC: 28.2 PG
MCHC RBC-ENTMCNC: 30.6 G/DL
MCV RBC AUTO: 92.3 FL
PLATELET # BLD AUTO: 135 K/UL
PMV BLD: 9.9 FL
RBC # BLD: 2.48 M/UL
RBC # FLD: 17.3 %
WBC # FLD AUTO: 3.99 K/UL

## 2022-06-29 RX ORDER — ERYTHROPOIETIN 10000 [IU]/ML
30000 INJECTION, SOLUTION INTRAVENOUS; SUBCUTANEOUS ONCE
Refills: 0 | Status: COMPLETED | OUTPATIENT
Start: 2022-06-29 | End: 2022-06-29

## 2022-06-29 RX ADMIN — ERYTHROPOIETIN 30000 UNIT(S): 10000 INJECTION, SOLUTION INTRAVENOUS; SUBCUTANEOUS at 14:42

## 2022-07-01 ENCOUNTER — APPOINTMENT (OUTPATIENT)
Dept: INFUSION THERAPY | Facility: CLINIC | Age: 77
End: 2022-07-01

## 2022-07-01 VITALS
RESPIRATION RATE: 16 BRPM | SYSTOLIC BLOOD PRESSURE: 116 MMHG | HEART RATE: 69 BPM | DIASTOLIC BLOOD PRESSURE: 55 MMHG | TEMPERATURE: 96.6 F

## 2022-07-01 VITALS
HEART RATE: 69 BPM | DIASTOLIC BLOOD PRESSURE: 53 MMHG | RESPIRATION RATE: 16 BRPM | TEMPERATURE: 97.3 F | SYSTOLIC BLOOD PRESSURE: 122 MMHG

## 2022-07-01 RX ORDER — IRON SUCROSE 20 MG/ML
200 INJECTION, SOLUTION INTRAVENOUS ONCE
Refills: 0 | Status: DISCONTINUED | OUTPATIENT
Start: 2022-07-01 | End: 2022-07-01

## 2022-07-01 RX ORDER — IRON SUCROSE 20 MG/ML
200 INJECTION, SOLUTION INTRAVENOUS ONCE
Refills: 0 | Status: COMPLETED | OUTPATIENT
Start: 2022-07-01 | End: 2022-07-13

## 2022-07-01 RX ORDER — ACETAMINOPHEN 500 MG
650 TABLET ORAL ONCE
Refills: 0 | Status: COMPLETED | OUTPATIENT
Start: 2022-07-01 | End: 2022-07-01

## 2022-07-01 RX ORDER — HYDROCORTISONE 20 MG
100 TABLET ORAL ONCE
Refills: 0 | Status: COMPLETED | OUTPATIENT
Start: 2022-07-01 | End: 2022-07-01

## 2022-07-01 RX ADMIN — Medication 650 MILLIGRAM(S): at 14:41

## 2022-07-01 RX ADMIN — Medication 650 MILLIGRAM(S): at 16:33

## 2022-07-01 RX ADMIN — Medication 100 MILLIGRAM(S): at 14:41

## 2022-07-01 RX ADMIN — Medication 100 MILLIGRAM(S): at 15:05

## 2022-07-01 RX ADMIN — Medication 650 MILLIGRAM(S): at 14:40

## 2022-07-06 ENCOUNTER — NON-APPOINTMENT (OUTPATIENT)
Age: 77
End: 2022-07-06

## 2022-07-06 ENCOUNTER — LABORATORY RESULT (OUTPATIENT)
Age: 77
End: 2022-07-06

## 2022-07-06 ENCOUNTER — APPOINTMENT (OUTPATIENT)
Dept: INFUSION THERAPY | Facility: CLINIC | Age: 77
End: 2022-07-06

## 2022-07-06 ENCOUNTER — EMERGENCY (EMERGENCY)
Facility: HOSPITAL | Age: 77
LOS: 0 days | Discharge: HOME | End: 2022-07-07
Attending: EMERGENCY MEDICINE | Admitting: EMERGENCY MEDICINE

## 2022-07-06 VITALS
HEART RATE: 72 BPM | DIASTOLIC BLOOD PRESSURE: 60 MMHG | SYSTOLIC BLOOD PRESSURE: 128 MMHG | RESPIRATION RATE: 16 BRPM | WEIGHT: 220.02 LBS | TEMPERATURE: 97 F | OXYGEN SATURATION: 100 % | HEIGHT: 67 IN

## 2022-07-06 DIAGNOSIS — R79.89 OTHER SPECIFIED ABNORMAL FINDINGS OF BLOOD CHEMISTRY: ICD-10-CM

## 2022-07-06 DIAGNOSIS — R53.1 WEAKNESS: ICD-10-CM

## 2022-07-06 DIAGNOSIS — N18.9 CHRONIC KIDNEY DISEASE, UNSPECIFIED: ICD-10-CM

## 2022-07-06 DIAGNOSIS — Z87.19 PERSONAL HISTORY OF OTHER DISEASES OF THE DIGESTIVE SYSTEM: Chronic | ICD-10-CM

## 2022-07-06 DIAGNOSIS — D64.9 ANEMIA, UNSPECIFIED: ICD-10-CM

## 2022-07-06 DIAGNOSIS — Z91.040 LATEX ALLERGY STATUS: ICD-10-CM

## 2022-07-06 DIAGNOSIS — Z88.6 ALLERGY STATUS TO ANALGESIC AGENT: ICD-10-CM

## 2022-07-06 DIAGNOSIS — I12.9 HYPERTENSIVE CHRONIC KIDNEY DISEASE WITH STAGE 1 THROUGH STAGE 4 CHRONIC KIDNEY DISEASE, OR UNSPECIFIED CHRONIC KIDNEY DISEASE: ICD-10-CM

## 2022-07-06 DIAGNOSIS — R06.02 SHORTNESS OF BREATH: ICD-10-CM

## 2022-07-06 DIAGNOSIS — Z88.0 ALLERGY STATUS TO PENICILLIN: ICD-10-CM

## 2022-07-06 LAB
ALBUMIN SERPL ELPH-MCNC: 3.8 G/DL — SIGNIFICANT CHANGE UP (ref 3.5–5.2)
ALP SERPL-CCNC: 79 U/L — SIGNIFICANT CHANGE UP (ref 30–115)
ALT FLD-CCNC: 8 U/L — SIGNIFICANT CHANGE UP (ref 0–41)
ANION GAP SERPL CALC-SCNC: 10 MMOL/L — SIGNIFICANT CHANGE UP (ref 7–14)
ANISOCYTOSIS BLD QL: SLIGHT — SIGNIFICANT CHANGE UP
APTT BLD: SIGNIFICANT CHANGE UP SEC (ref 27–39.2)
AST SERPL-CCNC: 32 U/L — SIGNIFICANT CHANGE UP (ref 0–41)
BASOPHILS # BLD AUTO: 0.03 K/UL — SIGNIFICANT CHANGE UP (ref 0–0.2)
BASOPHILS NFR BLD AUTO: 0.9 % — SIGNIFICANT CHANGE UP (ref 0–1)
BILIRUB SERPL-MCNC: 0.4 MG/DL — SIGNIFICANT CHANGE UP (ref 0.2–1.2)
BLD GP AB SCN SERPL QL: SIGNIFICANT CHANGE UP
BUN SERPL-MCNC: 62 MG/DL — CRITICAL HIGH (ref 10–20)
CALCIUM SERPL-MCNC: 8.5 MG/DL — SIGNIFICANT CHANGE UP (ref 8.5–10.1)
CHLORIDE SERPL-SCNC: 111 MMOL/L — HIGH (ref 98–110)
CO2 SERPL-SCNC: 22 MMOL/L — SIGNIFICANT CHANGE UP (ref 17–32)
CREAT SERPL-MCNC: 2.6 MG/DL — HIGH (ref 0.7–1.5)
EGFR: 18 ML/MIN/1.73M2 — LOW
EOSINOPHIL # BLD AUTO: 0.33 K/UL — SIGNIFICANT CHANGE UP (ref 0–0.7)
EOSINOPHIL NFR BLD AUTO: 11.2 % — HIGH (ref 0–8)
GIANT PLATELETS BLD QL SMEAR: PRESENT — SIGNIFICANT CHANGE UP
GLUCOSE SERPL-MCNC: 84 MG/DL — SIGNIFICANT CHANGE UP (ref 70–99)
HCT VFR BLD CALC: 21.2 %
HCT VFR BLD CALC: 21.4 % — LOW (ref 37–47)
HGB BLD-MCNC: 6.2 G/DL
HGB BLD-MCNC: 6.4 G/DL — CRITICAL LOW (ref 12–16)
HYPOCHROMIA BLD QL: SIGNIFICANT CHANGE UP
INR BLD: 0.96 RATIO — SIGNIFICANT CHANGE UP (ref 0.65–1.3)
LYMPHOCYTES # BLD AUTO: 0.82 K/UL — LOW (ref 1.2–3.4)
LYMPHOCYTES # BLD AUTO: 27.6 % — SIGNIFICANT CHANGE UP (ref 20.5–51.1)
MANUAL SMEAR VERIFICATION: SIGNIFICANT CHANGE UP
MCHC RBC-ENTMCNC: 26.7 PG
MCHC RBC-ENTMCNC: 26.8 PG — LOW (ref 27–31)
MCHC RBC-ENTMCNC: 29.2 G/DL
MCHC RBC-ENTMCNC: 29.9 G/DL — LOW (ref 32–37)
MCV RBC AUTO: 89.5 FL — SIGNIFICANT CHANGE UP (ref 81–99)
MCV RBC AUTO: 91.4 FL
MICROCYTES BLD QL: SLIGHT — SIGNIFICANT CHANGE UP
MONOCYTES # BLD AUTO: 0.08 K/UL — LOW (ref 0.1–0.6)
MONOCYTES NFR BLD AUTO: 2.6 % — SIGNIFICANT CHANGE UP (ref 1.7–9.3)
NEUTROPHILS # BLD AUTO: 1.72 K/UL — SIGNIFICANT CHANGE UP (ref 1.4–6.5)
NEUTROPHILS NFR BLD AUTO: 57.7 % — SIGNIFICANT CHANGE UP (ref 42.2–75.2)
OVALOCYTES BLD QL SMEAR: SLIGHT — SIGNIFICANT CHANGE UP
PLAT MORPH BLD: NORMAL — SIGNIFICANT CHANGE UP
PLATELET # BLD AUTO: 116 K/UL
PLATELET # BLD AUTO: 135 K/UL — SIGNIFICANT CHANGE UP (ref 130–400)
PMV BLD: 9.7 FL
POIKILOCYTOSIS BLD QL AUTO: SIGNIFICANT CHANGE UP
POLYCHROMASIA BLD QL SMEAR: SLIGHT — SIGNIFICANT CHANGE UP
POTASSIUM SERPL-MCNC: 5.3 MMOL/L — HIGH (ref 3.5–5)
POTASSIUM SERPL-SCNC: 5.3 MMOL/L — HIGH (ref 3.5–5)
PROT SERPL-MCNC: 6.2 G/DL — SIGNIFICANT CHANGE UP (ref 6–8)
PROTHROM AB SERPL-ACNC: 11 SEC — SIGNIFICANT CHANGE UP (ref 9.95–12.87)
RBC # BLD: 2.32 M/UL
RBC # BLD: 2.39 M/UL — LOW (ref 4.2–5.4)
RBC # FLD: 15.8 % — HIGH (ref 11.5–14.5)
RBC # FLD: 15.9 %
RBC BLD AUTO: ABNORMAL
SCHISTOCYTES BLD QL AUTO: SLIGHT — SIGNIFICANT CHANGE UP
SODIUM SERPL-SCNC: 143 MMOL/L — SIGNIFICANT CHANGE UP (ref 135–146)
TROPONIN T SERPL-MCNC: <0.01 NG/ML — SIGNIFICANT CHANGE UP
WBC # BLD: 2.98 K/UL — LOW (ref 4.8–10.8)
WBC # FLD AUTO: 2.98 K/UL — LOW (ref 4.8–10.8)
WBC # FLD AUTO: 3.72 K/UL

## 2022-07-06 PROCEDURE — 71046 X-RAY EXAM CHEST 2 VIEWS: CPT | Mod: 26

## 2022-07-06 PROCEDURE — 93010 ELECTROCARDIOGRAM REPORT: CPT

## 2022-07-06 PROCEDURE — 99220: CPT

## 2022-07-06 RX ORDER — ERYTHROPOIETIN 10000 [IU]/ML
30000 INJECTION, SOLUTION INTRAVENOUS; SUBCUTANEOUS ONCE
Refills: 0 | Status: COMPLETED | OUTPATIENT
Start: 2022-07-06 | End: 2022-07-06

## 2022-07-06 RX ORDER — DIPHENHYDRAMINE HCL 50 MG
50 CAPSULE ORAL ONCE
Refills: 0 | Status: COMPLETED | OUTPATIENT
Start: 2022-07-06 | End: 2022-07-06

## 2022-07-06 RX ORDER — ACETAMINOPHEN 500 MG
650 TABLET ORAL ONCE
Refills: 0 | Status: COMPLETED | OUTPATIENT
Start: 2022-07-06 | End: 2022-07-06

## 2022-07-06 RX ADMIN — Medication 650 MILLIGRAM(S): at 21:52

## 2022-07-06 RX ADMIN — ERYTHROPOIETIN 30000 UNIT(S): 10000 INJECTION, SOLUTION INTRAVENOUS; SUBCUTANEOUS at 15:26

## 2022-07-06 RX ADMIN — Medication 50 MILLIGRAM(S): at 21:52

## 2022-07-06 NOTE — ED PROVIDER NOTE - NS ED ATTENDING STATEMENT MOD
This was a shared visit with the TINO. I reviewed and verified the documentation and independently performed the documented:

## 2022-07-06 NOTE — ED PROVIDER NOTE - OBJECTIVE STATEMENT
77 yo female with a pmh of anemia, HTN, and CKD present for low hgb. pt states to have experienced weakness and SOB for the past several days. pt denies any other symptoms including fevers, chill, headache, recent illness/travel, cough, abdominal pain, chest pain.

## 2022-07-06 NOTE — ED PROVIDER NOTE - INTERPRETATION
nsr, L axis dev, LAFB, old RBBB, diffuse nonspecific ST/T abnormality simialr to prior. qtc 492 (old 489), no sig STD and no TRISTON

## 2022-07-06 NOTE — ED PROVIDER NOTE - IV ALTEPLASE ADMIN OUTSIDE HIDDEN
show
Implemented All Universal Safety Interventions:  [place] to call system. Call bell, personal items and telephone within reach. Instruct patient to call for assistance. Room bathroom lighting operational. Non-slip footwear when patient is off stretcher. Physically safe environment: no spills, clutter or unnecessary equipment. Stretcher in lowest position, wheels locked, appropriate side rails in place.

## 2022-07-06 NOTE — ED PROVIDER NOTE - CLINICAL SUMMARY MEDICAL DECISION MAKING FREE TEXT BOX
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 +fatigue and sob but dneies cp. states had 1 episode of blood on toilet paper 2 days ago but otherwise no hematochezia or melena. no emesis. no abd pain now. on exam, nontoxic, vss   Gen: Alert, NAD  Skin: Warm, dry, intact  Head: NCAT  ENT: Mucous membranes moist  Neck: Supple  CV: RRR, normal S1, S2, vikas-decres systolic murmur  Resp: Non labored respirations, Lungs CTA b/l, no wheezes, rales, rhonchi  Abdomen: Soft, nondistended, nontender  Extremities: Moving all extremities, no edema  Neuro: No focal neuro deficits  Psych: Cooperative     ekg as above, at baseline. hgb 6.4. prior notes with recs to transfuse to 8. refused rectal exam but does endorse normal nonbloody BMs since 1 episode of blood in stool 2 days ago. plan to transfuse 2 untis in obs and reassess.

## 2022-07-07 VITALS — HEART RATE: 77 BPM | DIASTOLIC BLOOD PRESSURE: 58 MMHG | SYSTOLIC BLOOD PRESSURE: 128 MMHG | TEMPERATURE: 98 F

## 2022-07-07 PROCEDURE — 99217: CPT

## 2022-07-07 NOTE — ED ADULT NURSE NOTE - NSIMPLEMENTINTERV_GEN_ALL_ED
Implemented All Universal Safety Interventions:  [place] to call system. Call bell, personal items and telephone within reach. Instruct patient to call for assistance. Room bathroom lighting operational. Non-slip footwear when patient is off stretcher. Physically safe environment: no spills, clutter or unnecessary equipment. Stretcher in lowest position, wheels locked, appropriate side rails in place.

## 2022-07-07 NOTE — ED CDU PROVIDER DISPOSITION NOTE - CLINICAL COURSE
76-year-old female with past medical history of anemia was placed in observation for low hemoglobin.  Patient had blood work which demonstrated hemoglobin of 6.4 and blood was ordered.  Patient received blood observation without any complications.  DC home with strict return precautions.

## 2022-07-07 NOTE — ED CDU PROVIDER DISPOSITION NOTE - CARE PROVIDER_API CALL
Liu Gaston)  Internal Medicine; Medical Oncology  [address] [place] [address]  Phone: [phone]  Fax: [phone]  Follow Up Time: 1-3 Days

## 2022-07-07 NOTE — ED CDU PROVIDER DISPOSITION NOTE - PATIENT PORTAL LINK FT
You can access the FollowMyHealth Patient Portal offered by [facility] by registering at the following website: [email or web address]. By joining [facility]’s FollowMyHealth portal, you will also be able to view your health information using other applications (apps) compatible with our system.

## 2022-07-07 NOTE — ED CDU PROVIDER DISPOSITION NOTE - NSDCPRINTRESULTS_ED_ALL_ED
Patient left a voicemail regarding her INR is 2.7     She would like a return call.   Patient requests all Lab, Cardiology, and Radiology Results on their Discharge Instructions

## 2022-07-07 NOTE — ED ADULT NURSE NOTE - HIV OFFER
Left message for patient to give us a call back. Please help patient make an appointment. Then we can refill medication to the appointment date. Previously Declined (within the last year)

## 2022-07-08 ENCOUNTER — APPOINTMENT (OUTPATIENT)
Dept: INFUSION THERAPY | Facility: CLINIC | Age: 77
End: 2022-07-08

## 2022-07-11 ENCOUNTER — APPOINTMENT (OUTPATIENT)
Dept: HEMATOLOGY ONCOLOGY | Facility: CLINIC | Age: 77
End: 2022-07-11

## 2022-07-11 ENCOUNTER — APPOINTMENT (OUTPATIENT)
Dept: INFUSION THERAPY | Facility: CLINIC | Age: 77
End: 2022-07-11

## 2022-07-11 ENCOUNTER — LABORATORY RESULT (OUTPATIENT)
Age: 77
End: 2022-07-11

## 2022-07-11 VITALS
HEIGHT: 66 IN | HEART RATE: 70 BPM | DIASTOLIC BLOOD PRESSURE: 54 MMHG | BODY MASS INDEX: 38.25 KG/M2 | WEIGHT: 238 LBS | SYSTOLIC BLOOD PRESSURE: 120 MMHG | RESPIRATION RATE: 16 BRPM | OXYGEN SATURATION: 98 % | TEMPERATURE: 98.1 F

## 2022-07-11 PROCEDURE — 99213 OFFICE O/P EST LOW 20 MIN: CPT

## 2022-07-11 RX ORDER — IRON SUCROSE 20 MG/ML
200 INJECTION, SOLUTION INTRAVENOUS ONCE
Refills: 0 | Status: COMPLETED | OUTPATIENT
Start: 2022-07-11 | End: 2022-07-11

## 2022-07-11 RX ORDER — HYDROCORTISONE 20 MG
100 TABLET ORAL ONCE
Refills: 0 | Status: COMPLETED | OUTPATIENT
Start: 2022-07-11 | End: 2022-07-11

## 2022-07-11 RX ORDER — ERYTHROPOIETIN 10000 [IU]/ML
30000 INJECTION, SOLUTION INTRAVENOUS; SUBCUTANEOUS ONCE
Refills: 0 | Status: COMPLETED | OUTPATIENT
Start: 2022-07-11 | End: 2022-07-11

## 2022-07-11 RX ORDER — ACETAMINOPHEN 500 MG
650 TABLET ORAL ONCE
Refills: 0 | Status: COMPLETED | OUTPATIENT
Start: 2022-07-11 | End: 2022-07-13

## 2022-07-11 RX ADMIN — Medication 100 MILLIGRAM(S): at 16:14

## 2022-07-11 RX ADMIN — ERYTHROPOIETIN 30000 UNIT(S): 10000 INJECTION, SOLUTION INTRAVENOUS; SUBCUTANEOUS at 15:47

## 2022-07-11 RX ADMIN — Medication 100 MILLIGRAM(S): at 16:35

## 2022-07-11 RX ADMIN — IRON SUCROSE 200 MILLIGRAM(S): 20 INJECTION, SOLUTION INTRAVENOUS at 16:40

## 2022-07-11 RX ADMIN — Medication 650 MILLIGRAM(S): at 16:00

## 2022-07-11 RX ADMIN — IRON SUCROSE 220 MILLIGRAM(S): 20 INJECTION, SOLUTION INTRAVENOUS at 15:47

## 2022-07-12 LAB
ABO + RH PNL BLD: NORMAL
BLD GP AB SCN SERPL QL: NORMAL

## 2022-07-13 ENCOUNTER — APPOINTMENT (OUTPATIENT)
Dept: INFUSION THERAPY | Facility: CLINIC | Age: 77
End: 2022-07-13

## 2022-07-13 VITALS
SYSTOLIC BLOOD PRESSURE: 118 MMHG | HEART RATE: 71 BPM | TEMPERATURE: 98.1 F | RESPIRATION RATE: 16 BRPM | DIASTOLIC BLOOD PRESSURE: 54 MMHG

## 2022-07-13 VITALS
RESPIRATION RATE: 16 BRPM | HEART RATE: 72 BPM | DIASTOLIC BLOOD PRESSURE: 60 MMHG | SYSTOLIC BLOOD PRESSURE: 135 MMHG | TEMPERATURE: 98.2 F

## 2022-07-13 VITALS
HEART RATE: 69 BPM | TEMPERATURE: 98.1 F | RESPIRATION RATE: 18 BRPM | SYSTOLIC BLOOD PRESSURE: 118 MMHG | DIASTOLIC BLOOD PRESSURE: 53 MMHG

## 2022-07-13 RX ORDER — ACETAMINOPHEN 500 MG
650 TABLET ORAL ONCE
Refills: 0 | Status: COMPLETED | OUTPATIENT
Start: 2022-07-13 | End: 2022-07-13

## 2022-07-13 RX ORDER — HYDROCORTISONE 20 MG
100 TABLET ORAL ONCE
Refills: 0 | Status: COMPLETED | OUTPATIENT
Start: 2022-07-13 | End: 2022-07-13

## 2022-07-13 RX ADMIN — Medication 650 MILLIGRAM(S): at 15:37

## 2022-07-13 RX ADMIN — Medication 100 MILLIGRAM(S): at 15:05

## 2022-07-13 RX ADMIN — IRON SUCROSE 220 MILLIGRAM(S): 20 INJECTION, SOLUTION INTRAVENOUS at 14:54

## 2022-07-13 RX ADMIN — Medication 650 MILLIGRAM(S): at 14:53

## 2022-07-13 RX ADMIN — Medication 100 MILLIGRAM(S): at 15:38

## 2022-07-13 NOTE — HISTORY OF PRESENT ILLNESS
[de-identified] : 09/17/2020 Patient returns for follow up , she feels better after venofer X 4 and on EPO 10,000 weekly , Hb is up to 10 . \par \par 10/15/2020 Patient returns for follow up 2 weeks after last dose of EPO , today's Hb is 10.7 . she offers no new complaints . \par \par 04/08/2021 Patient returns for follow up for anemia on EPO and iron replacement . Hb is 10.8 . she complains of mild left arm pain after she started to self check her BP . ahe meds were recently adjusted by her PMD . \par \par 08/31/2021 patient returns for follow up , she offers no new complaints , her Hgb is slowly trending down after last transfusion and venofer X1 for ferritin : 35 . \par \par 11/18/2021 patient returns for follow up complaining of weakness and dyspnea on minimal exertion , still with lower extremity edema R > L . Hgb is 7.1 2 weeks after transfusion and despite venofer and EPO . She denies melena or hematochezia . Of note she had suspected small bowel bleeding on enteroscopy or capsule endoscopy and was intolerant to push enteroscopy ( hypotension ? ) \par 2/14/22- Patient is here for follow up visit for management of DARRIN. Hgb is 6.7  3 days only after last transfusion , she reports several episodes of bleeding presumably from hemorrhoids and is using topical medications , Ferritin levels remain low despite venofer . She complains of weakness, bilateral leg edema . no chest pain or SOB . \par \par 12/2/21: patient returns for follow up for DARRIN secondary  obscure GI bleeding  and CKD. \par She is feeling better today. We will continue  Venofer infusion weekly X 3 and Procrit.\par Close monitor CBC on weekly basis with possible blood transfusion if required. \par \par 5/19/2022 Patient returns for follow up , she lost her  last week to lung cancer . He Hgb is still 7.2  two weeks after transfusion and despite weekly venofer . Ferritin is trending down . She is noted with weight loss and complains of abdominal cramps and pain radiating from epigastric area and RUQ , radiating to the back .\par Of note CT scan from 1/2022 showed prominent adenopathy involving mesenteric and retroperitoneal lymph nodes. \par \par 7/11/22: Patient returns for ofllowup. \par on 30k since may\par needec 2 unkits prbcs last week\par last venofer 6/23 200mg, ferritin 60 at that time\par \par \par \par  [de-identified] : bruce is a 74 year old white female with hypertension, chronic kidney disease, morbidly obese presents today with normocytic anemia. \par Her most recent CBC 04/01/2020 shows WBC -5.66, HB of 6.7, MCV -86.3, RDW - 17.3, platelet count- 196. Her Hb was normal until 08/2019. In October 2019 she noticed black tarry stools and was feeling tired. She went to ER and her Hb was 5.3. She was given 3 units of PRBC. Her iron studies at that showed ferritin of 8 and percent saturation of 5. She had EGD and colonoscopy which did not reveal any bleeding lesions. Following that event as per patient she was not given iron (?not sure why). She was readmitted to hospital in 01/2020 for SOB on exertion and her HB was noted to 6.0 again. She was given 2 units and her iron studies were consistent with DARRIN. B12 and folate were normal. Immunofixation showed weak IgG lambda migrating para protein. Free light chain ratio 2.1. Normal calcium. \par EGD and VCE was done. EGD revealed gastritis and VCE showed bleeding in small bowel. \par She was started on oral iron and she took for about three months. \par Her latest ferritin done in feb 2020 was 24 and percent saturation was 72% and her hb improved to 8.8. She also has push enteroscopy in 02/2020 and no bleeding lesions were found. Was recommended to have repeat colonoscopy and double balloon enteroscopy if she bleeds again.\par She went for blood work again on 04/01/2020 as she was feeling weak and having SOB and her hb dropped to 6.7. She was told by PMD to start on PROCRIT 10,000 units M-W-F. She was told her iron levels are good and she can stop iron. \par \par Today she feels very tired and her SOB is worse. She denies any melena or BRBPR in last few days. She does have anal fissure and hemorrhoids and bleeds when she is constipated. She denies hematuria or post menopausal bleeding. Denies weight loss. Does not take any NSAIDs or aspirin. She has not followed up with nephrology before.\par Family history is significant for breast cancer in her mother. She is a former smoker stopped 20 years ago.\par She is uptodate with mammogram and Pap smear. \par

## 2022-07-13 NOTE — PHYSICAL EXAM
[Obese] : obese [Normal] : clear to auscultation bilaterally, no dullness, no wheezing [de-identified] : pale in no acute distress.  [de-identified] : grade 3/6 systolic murmur  [de-identified] : lower extremity edema ( R > L ) 1 to 2 plus .  [de-identified] : mild diffuse tenderness , no rebound

## 2022-07-18 ENCOUNTER — LABORATORY RESULT (OUTPATIENT)
Age: 77
End: 2022-07-18

## 2022-07-18 ENCOUNTER — APPOINTMENT (OUTPATIENT)
Dept: INFUSION THERAPY | Facility: CLINIC | Age: 77
End: 2022-07-18

## 2022-07-18 LAB
HCT VFR BLD CALC: 24.6 %
HGB BLD-MCNC: 7.8 G/DL
MCHC RBC-ENTMCNC: 28.5 PG
MCHC RBC-ENTMCNC: 31.7 G/DL
MCV RBC AUTO: 89.8 FL
PLATELET # BLD AUTO: 106 K/UL
PMV BLD: 10.7 FL
RBC # BLD: 2.74 M/UL
RBC # FLD: 16.2 %
WBC # FLD AUTO: 3.58 K/UL

## 2022-07-18 RX ORDER — ACETAMINOPHEN 500 MG
650 TABLET ORAL ONCE
Refills: 0 | Status: COMPLETED | OUTPATIENT
Start: 2022-07-18 | End: 2022-07-18

## 2022-07-18 RX ORDER — HYDROCORTISONE 20 MG
100 TABLET ORAL ONCE
Refills: 0 | Status: COMPLETED | OUTPATIENT
Start: 2022-07-18 | End: 2022-07-18

## 2022-07-18 RX ORDER — IRON SUCROSE 20 MG/ML
200 INJECTION, SOLUTION INTRAVENOUS ONCE
Refills: 0 | Status: DISCONTINUED | OUTPATIENT
Start: 2022-07-18 | End: 2022-12-03

## 2022-07-18 RX ORDER — ERYTHROPOIETIN 10000 [IU]/ML
30000 INJECTION, SOLUTION INTRAVENOUS; SUBCUTANEOUS ONCE
Refills: 0 | Status: COMPLETED | OUTPATIENT
Start: 2022-07-18 | End: 2022-07-18

## 2022-07-18 RX ADMIN — Medication 650 MILLIGRAM(S): at 15:16

## 2022-07-18 RX ADMIN — ERYTHROPOIETIN 30000 UNIT(S): 10000 INJECTION, SOLUTION INTRAVENOUS; SUBCUTANEOUS at 15:17

## 2022-07-18 RX ADMIN — Medication 100 MILLIGRAM(S): at 15:16

## 2022-07-20 ENCOUNTER — APPOINTMENT (OUTPATIENT)
Dept: INFUSION THERAPY | Facility: CLINIC | Age: 77
End: 2022-07-20

## 2022-07-26 ENCOUNTER — LABORATORY RESULT (OUTPATIENT)
Age: 77
End: 2022-07-26

## 2022-07-26 ENCOUNTER — APPOINTMENT (OUTPATIENT)
Dept: INFUSION THERAPY | Facility: CLINIC | Age: 77
End: 2022-07-26

## 2022-07-26 VITALS
HEART RATE: 73 BPM | SYSTOLIC BLOOD PRESSURE: 116 MMHG | RESPIRATION RATE: 16 BRPM | TEMPERATURE: 97.9 F | DIASTOLIC BLOOD PRESSURE: 57 MMHG

## 2022-07-26 LAB
HCT VFR BLD CALC: 23.3 %
HGB BLD-MCNC: 7.2 G/DL
MCHC RBC-ENTMCNC: 28 PG
MCHC RBC-ENTMCNC: 30.9 G/DL
MCV RBC AUTO: 90.7 FL
PLATELET # BLD AUTO: 133 K/UL
PMV BLD: 10.2 FL
RBC # BLD: 2.57 M/UL
RBC # FLD: 16.9 %
WBC # FLD AUTO: 3.8 K/UL

## 2022-07-26 RX ORDER — ACETAMINOPHEN 500 MG
650 TABLET ORAL ONCE
Refills: 0 | Status: COMPLETED | OUTPATIENT
Start: 2022-07-26 | End: 2022-07-26

## 2022-07-26 RX ORDER — ERYTHROPOIETIN 10000 [IU]/ML
30000 INJECTION, SOLUTION INTRAVENOUS; SUBCUTANEOUS ONCE
Refills: 0 | Status: COMPLETED | OUTPATIENT
Start: 2022-07-26 | End: 2022-08-15

## 2022-07-26 RX ORDER — IRON SUCROSE 20 MG/ML
200 INJECTION, SOLUTION INTRAVENOUS ONCE
Refills: 0 | Status: DISCONTINUED | OUTPATIENT
Start: 2022-07-26 | End: 2022-12-03

## 2022-07-26 RX ORDER — HYDROCORTISONE 20 MG
100 TABLET ORAL ONCE
Refills: 0 | Status: COMPLETED | OUTPATIENT
Start: 2022-07-26 | End: 2022-07-26

## 2022-07-26 RX ADMIN — Medication 100 MILLIGRAM(S): at 15:37

## 2022-07-26 RX ADMIN — Medication 650 MILLIGRAM(S): at 15:37

## 2022-07-27 ENCOUNTER — APPOINTMENT (OUTPATIENT)
Dept: INFUSION THERAPY | Facility: CLINIC | Age: 77
End: 2022-07-27

## 2022-07-27 LAB
ABO + RH PNL BLD: NORMAL
BLD GP AB SCN SERPL QL: NORMAL

## 2022-07-28 ENCOUNTER — APPOINTMENT (OUTPATIENT)
Dept: CARDIOLOGY | Facility: CLINIC | Age: 77
End: 2022-07-28

## 2022-07-28 VITALS
BODY MASS INDEX: 37.93 KG/M2 | TEMPERATURE: 97.3 F | HEIGHT: 66 IN | DIASTOLIC BLOOD PRESSURE: 80 MMHG | HEART RATE: 92 BPM | WEIGHT: 236 LBS | SYSTOLIC BLOOD PRESSURE: 124 MMHG

## 2022-07-28 DIAGNOSIS — I10 ESSENTIAL (PRIMARY) HYPERTENSION: ICD-10-CM

## 2022-07-28 DIAGNOSIS — E66.9 OBESITY, UNSPECIFIED: ICD-10-CM

## 2022-07-28 DIAGNOSIS — I20.9 ANGINA PECTORIS, UNSPECIFIED: ICD-10-CM

## 2022-07-28 PROCEDURE — 99214 OFFICE O/P EST MOD 30 MIN: CPT | Mod: 25

## 2022-07-28 PROCEDURE — 93000 ELECTROCARDIOGRAM COMPLETE: CPT

## 2022-07-28 NOTE — PHYSICAL EXAM

## 2022-07-28 NOTE — HISTORY OF PRESENT ILLNESS
[FreeTextEntry1] : 75 y/o F with Hx CAD, HTN,and angina pectoris on Metoprolol presents for cardiac follow up visit. Pt reports left sided chest tightness radiating to her upper abdomen. She denies SOB, dyspnea on exertion, palpitations, leg swelling, or syncope. Pt reports no other complaints. Pt is complaint with her medications.\par \par patient had an echo which showed moderate aortic stenosis\par gradient of 32mm hg \par lv function is normal\par \par no symptoms\par \par repeat echo is non diagnostic\par will follow medically\par possible cath in 3 mos. or sooner if symptoms get worse\par \par no chf\par no angina\par no sob\par no syncope\par \par hct of 23 due to gi bleed\par needs blood transfusion

## 2022-07-29 ENCOUNTER — APPOINTMENT (OUTPATIENT)
Dept: INFUSION THERAPY | Facility: CLINIC | Age: 77
End: 2022-07-29

## 2022-07-29 VITALS
SYSTOLIC BLOOD PRESSURE: 113 MMHG | HEART RATE: 63 BPM | TEMPERATURE: 97 F | DIASTOLIC BLOOD PRESSURE: 53 MMHG | RESPIRATION RATE: 19 BRPM

## 2022-07-29 VITALS
TEMPERATURE: 97.2 F | DIASTOLIC BLOOD PRESSURE: 56 MMHG | HEART RATE: 68 BPM | RESPIRATION RATE: 17 BRPM | SYSTOLIC BLOOD PRESSURE: 116 MMHG

## 2022-07-29 VITALS
TEMPERATURE: 97.2 F | RESPIRATION RATE: 16 BRPM | SYSTOLIC BLOOD PRESSURE: 116 MMHG | DIASTOLIC BLOOD PRESSURE: 51 MMHG | HEART RATE: 67 BPM

## 2022-07-29 VITALS
HEART RATE: 69 BPM | DIASTOLIC BLOOD PRESSURE: 54 MMHG | RESPIRATION RATE: 18 BRPM | TEMPERATURE: 97 F | SYSTOLIC BLOOD PRESSURE: 121 MMHG

## 2022-07-29 VITALS
HEART RATE: 76 BPM | TEMPERATURE: 97.1 F | SYSTOLIC BLOOD PRESSURE: 132 MMHG | DIASTOLIC BLOOD PRESSURE: 61 MMHG | RESPIRATION RATE: 18 BRPM

## 2022-07-29 RX ORDER — ACETAMINOPHEN 500 MG
650 TABLET ORAL ONCE
Refills: 0 | Status: COMPLETED | OUTPATIENT
Start: 2022-07-29 | End: 2022-07-29

## 2022-07-29 RX ORDER — HYDROCORTISONE 20 MG
100 TABLET ORAL ONCE
Refills: 0 | Status: COMPLETED | OUTPATIENT
Start: 2022-07-29 | End: 2022-07-29

## 2022-07-29 RX ADMIN — Medication 650 MILLIGRAM(S): at 13:00

## 2022-07-29 RX ADMIN — Medication 100 MILLIGRAM(S): at 12:45

## 2022-07-29 RX ADMIN — Medication 100 MILLIGRAM(S): at 12:20

## 2022-07-29 RX ADMIN — Medication 650 MILLIGRAM(S): at 12:21

## 2022-08-01 ENCOUNTER — LABORATORY RESULT (OUTPATIENT)
Age: 77
End: 2022-08-01

## 2022-08-01 ENCOUNTER — APPOINTMENT (OUTPATIENT)
Dept: INFUSION THERAPY | Facility: CLINIC | Age: 77
End: 2022-08-01

## 2022-08-01 LAB
HCT VFR BLD CALC: 25.4 %
HGB BLD-MCNC: 7.9 G/DL
MCHC RBC-ENTMCNC: 28.3 PG
MCHC RBC-ENTMCNC: 31.1 G/DL
MCV RBC AUTO: 91 FL
PLATELET # BLD AUTO: 142 K/UL
PMV BLD: 10.4 FL
RBC # BLD: 2.79 M/UL
RBC # FLD: 18.6 %
WBC # FLD AUTO: 4.55 K/UL

## 2022-08-01 RX ORDER — IRON SUCROSE 20 MG/ML
200 INJECTION, SOLUTION INTRAVENOUS ONCE
Refills: 0 | Status: COMPLETED | OUTPATIENT
Start: 2022-08-01 | End: 2022-08-01

## 2022-08-01 RX ORDER — ERYTHROPOIETIN 10000 [IU]/ML
30000 INJECTION, SOLUTION INTRAVENOUS; SUBCUTANEOUS ONCE
Refills: 0 | Status: COMPLETED | OUTPATIENT
Start: 2022-08-01 | End: 2022-08-01

## 2022-08-01 RX ORDER — ACETAMINOPHEN 500 MG
650 TABLET ORAL ONCE
Refills: 0 | Status: COMPLETED | OUTPATIENT
Start: 2022-08-01 | End: 2022-08-01

## 2022-08-01 RX ORDER — HYDROCORTISONE 20 MG
100 TABLET ORAL ONCE
Refills: 0 | Status: COMPLETED | OUTPATIENT
Start: 2022-08-01 | End: 2022-08-01

## 2022-08-01 RX ADMIN — Medication 650 MILLIGRAM(S): at 14:09

## 2022-08-01 RX ADMIN — ERYTHROPOIETIN 30000 UNIT(S): 10000 INJECTION, SOLUTION INTRAVENOUS; SUBCUTANEOUS at 14:10

## 2022-08-01 RX ADMIN — IRON SUCROSE 220 MILLIGRAM(S): 20 INJECTION, SOLUTION INTRAVENOUS at 14:10

## 2022-08-01 RX ADMIN — Medication 100 MILLIGRAM(S): at 14:10

## 2022-08-03 LAB
HCT VFR BLD CALC: 23.3 %
HGB BLD-MCNC: 7.2 G/DL
MCHC RBC-ENTMCNC: 27.7 PG
MCHC RBC-ENTMCNC: 30.9 G/DL
MCV RBC AUTO: 89.6 FL
PLATELET # BLD AUTO: 131 K/UL
PMV BLD: 9.9 FL
RBC # BLD: 2.6 M/UL
RBC # FLD: 15.4 %
WBC # FLD AUTO: 4.07 K/UL

## 2022-08-08 ENCOUNTER — APPOINTMENT (OUTPATIENT)
Dept: INFUSION THERAPY | Facility: CLINIC | Age: 77
End: 2022-08-08

## 2022-08-08 ENCOUNTER — NON-APPOINTMENT (OUTPATIENT)
Age: 77
End: 2022-08-08

## 2022-08-08 ENCOUNTER — LABORATORY RESULT (OUTPATIENT)
Age: 77
End: 2022-08-08

## 2022-08-08 ENCOUNTER — APPOINTMENT (OUTPATIENT)
Dept: HEMATOLOGY ONCOLOGY | Facility: CLINIC | Age: 77
End: 2022-08-08

## 2022-08-08 VITALS
DIASTOLIC BLOOD PRESSURE: 56 MMHG | HEIGHT: 66 IN | TEMPERATURE: 97.2 F | WEIGHT: 236 LBS | OXYGEN SATURATION: 98 % | HEART RATE: 74 BPM | RESPIRATION RATE: 18 BRPM | BODY MASS INDEX: 37.93 KG/M2 | SYSTOLIC BLOOD PRESSURE: 123 MMHG

## 2022-08-08 LAB
ABO + RH PNL BLD: NORMAL
BLD GP AB SCN SERPL QL: NORMAL
HCT VFR BLD CALC: 22.3 %
HGB BLD-MCNC: 6.9 G/DL
MCHC RBC-ENTMCNC: 28.5 PG
MCHC RBC-ENTMCNC: 30.9 G/DL
MCV RBC AUTO: 92.1 FL
PLATELET # BLD AUTO: 128 K/UL
PMV BLD: 10.4 FL
RBC # BLD: 2.42 M/UL
RBC # FLD: 18.6 %
WBC # FLD AUTO: 4.12 K/UL

## 2022-08-08 PROCEDURE — 99213 OFFICE O/P EST LOW 20 MIN: CPT

## 2022-08-08 PROCEDURE — 86077 PHYS BLOOD BANK SERV XMATCH: CPT

## 2022-08-08 RX ORDER — ERYTHROPOIETIN 10000 [IU]/ML
30000 INJECTION, SOLUTION INTRAVENOUS; SUBCUTANEOUS ONCE
Refills: 0 | Status: COMPLETED | OUTPATIENT
Start: 2022-08-08 | End: 2022-08-08

## 2022-08-08 RX ORDER — HYDROCORTISONE 20 MG
100 TABLET ORAL ONCE
Refills: 0 | Status: COMPLETED | OUTPATIENT
Start: 2022-08-08 | End: 2022-08-08

## 2022-08-08 RX ORDER — IRON SUCROSE 20 MG/ML
200 INJECTION, SOLUTION INTRAVENOUS ONCE
Refills: 0 | Status: COMPLETED | OUTPATIENT
Start: 2022-08-08 | End: 2022-08-08

## 2022-08-08 RX ORDER — ACETAMINOPHEN 500 MG
650 TABLET ORAL ONCE
Refills: 0 | Status: COMPLETED | OUTPATIENT
Start: 2022-08-08 | End: 2022-08-08

## 2022-08-08 RX ADMIN — IRON SUCROSE 200 MILLIGRAM(S): 20 INJECTION, SOLUTION INTRAVENOUS at 17:05

## 2022-08-08 RX ADMIN — ERYTHROPOIETIN 30000 UNIT(S): 10000 INJECTION, SOLUTION INTRAVENOUS; SUBCUTANEOUS at 16:10

## 2022-08-08 RX ADMIN — Medication 100 MILLIGRAM(S): at 16:11

## 2022-08-08 RX ADMIN — IRON SUCROSE 220 MILLIGRAM(S): 20 INJECTION, SOLUTION INTRAVENOUS at 16:35

## 2022-08-08 RX ADMIN — Medication 650 MILLIGRAM(S): at 16:11

## 2022-08-08 RX ADMIN — Medication 100 MILLIGRAM(S): at 16:31

## 2022-08-08 RX ADMIN — Medication 650 MILLIGRAM(S): at 17:02

## 2022-08-09 ENCOUNTER — RESULT REVIEW (OUTPATIENT)
Age: 77
End: 2022-08-09

## 2022-08-09 ENCOUNTER — APPOINTMENT (OUTPATIENT)
Dept: INFUSION THERAPY | Facility: CLINIC | Age: 77
End: 2022-08-09

## 2022-08-09 ENCOUNTER — OUTPATIENT (OUTPATIENT)
Dept: OUTPATIENT SERVICES | Facility: HOSPITAL | Age: 77
LOS: 1 days | Discharge: HOME | End: 2022-08-09

## 2022-08-09 VITALS
HEART RATE: 67 BPM | RESPIRATION RATE: 1 BRPM | TEMPERATURE: 98.4 F | DIASTOLIC BLOOD PRESSURE: 61 MMHG | SYSTOLIC BLOOD PRESSURE: 137 MMHG

## 2022-08-09 VITALS
HEART RATE: 71 BPM | DIASTOLIC BLOOD PRESSURE: 61 MMHG | SYSTOLIC BLOOD PRESSURE: 137 MMHG | TEMPERATURE: 98.7 F | RESPIRATION RATE: 16 BRPM

## 2022-08-09 VITALS
RESPIRATION RATE: 16 BRPM | TEMPERATURE: 97.8 F | HEART RATE: 67 BPM | DIASTOLIC BLOOD PRESSURE: 52 MMHG | SYSTOLIC BLOOD PRESSURE: 115 MMHG

## 2022-08-09 VITALS
HEART RATE: 70 BPM | DIASTOLIC BLOOD PRESSURE: 60 MMHG | RESPIRATION RATE: 16 BRPM | SYSTOLIC BLOOD PRESSURE: 134 MMHG | TEMPERATURE: 98.2 F

## 2022-08-09 VITALS
SYSTOLIC BLOOD PRESSURE: 107 MMHG | RESPIRATION RATE: 16 BRPM | DIASTOLIC BLOOD PRESSURE: 44 MMHG | HEART RATE: 67 BPM | TEMPERATURE: 98.1 F

## 2022-08-09 DIAGNOSIS — Z87.19 PERSONAL HISTORY OF OTHER DISEASES OF THE DIGESTIVE SYSTEM: Chronic | ICD-10-CM

## 2022-08-09 DIAGNOSIS — R59.9 ENLARGED LYMPH NODES, UNSPECIFIED: ICD-10-CM

## 2022-08-09 LAB — GLUCOSE BLDC GLUCOMTR-MCNC: 96 MG/DL — SIGNIFICANT CHANGE UP (ref 70–99)

## 2022-08-09 PROCEDURE — 78815 PET IMAGE W/CT SKULL-THIGH: CPT | Mod: 26,PI

## 2022-08-09 RX ORDER — FUROSEMIDE 40 MG
20 TABLET ORAL ONCE
Refills: 0 | Status: COMPLETED | OUTPATIENT
Start: 2022-08-09 | End: 2022-08-09

## 2022-08-09 RX ORDER — HYDROCORTISONE 20 MG
100 TABLET ORAL ONCE
Refills: 0 | Status: COMPLETED | OUTPATIENT
Start: 2022-08-09 | End: 2022-08-09

## 2022-08-09 RX ORDER — ACETAMINOPHEN 500 MG
650 TABLET ORAL ONCE
Refills: 0 | Status: COMPLETED | OUTPATIENT
Start: 2022-08-09 | End: 2022-08-09

## 2022-08-09 RX ADMIN — Medication 20 MILLIGRAM(S): at 15:25

## 2022-08-09 RX ADMIN — Medication 100 MILLIGRAM(S): at 15:25

## 2022-08-09 RX ADMIN — Medication 650 MILLIGRAM(S): at 15:24

## 2022-08-10 LAB — FERRITIN SERPL-MCNC: 189 NG/ML

## 2022-08-10 NOTE — ASSESSMENT
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 ) , \par peripheral edema . \par Constipation , rectal bleeding ( hemorrhoids ? ) , not cleared for GI work up . \par Abdominal pain , weight loss . Prominent mesenteric lymph nodes.\par \par CBC reviewed with patient , anemia , slightly symptomatic . \par Plan : continue weekly venofer, EPO.\par           transfuse 2 units \par            PET scan in AM .

## 2022-08-10 NOTE — PHYSICAL EXAM
[Obese] : obese [Normal] : clear to auscultation bilaterally, no dullness, no wheezing [de-identified] : pale in no acute distress.  [de-identified] : lower extremity edema ( R > L ) 1 to 2 plus .  [de-identified] : grade 3/6 systolic murmur  [de-identified] : mild diffuse tenderness , no rebound

## 2022-08-10 NOTE — HISTORY OF PRESENT ILLNESS
[de-identified] : bruce is a 74 year old white female with hypertension, chronic kidney disease, morbidly obese presents today with normocytic anemia. \par Her most recent CBC 04/01/2020 shows WBC -5.66, HB of 6.7, MCV -86.3, RDW - 17.3, platelet count- 196. Her Hb was normal until 08/2019. In October 2019 she noticed black tarry stools and was feeling tired. She went to ER and her Hb was 5.3. She was given 3 units of PRBC. Her iron studies at that showed ferritin of 8 and percent saturation of 5. She had EGD and colonoscopy which did not reveal any bleeding lesions. Following that event as per patient she was not given iron (?not sure why). She was readmitted to hospital in 01/2020 for SOB on exertion and her HB was noted to 6.0 again. She was given 2 units and her iron studies were consistent with DARRIN. B12 and folate were normal. Immunofixation showed weak IgG lambda migrating para protein. Free light chain ratio 2.1. Normal calcium. \par EGD and VCE was done. EGD revealed gastritis and VCE showed bleeding in small bowel. \par She was started on oral iron and she took for about three months. \par Her latest ferritin done in feb 2020 was 24 and percent saturation was 72% and her hb improved to 8.8. She also has push enteroscopy in 02/2020 and no bleeding lesions were found. Was recommended to have repeat colonoscopy and double balloon enteroscopy if she bleeds again.\par She went for blood work again on 04/01/2020 as she was feeling weak and having SOB and her hb dropped to 6.7. She was told by PMD to start on PROCRIT 10,000 units M-W-F. She was told her iron levels are good and she can stop iron. \par \par Today she feels very tired and her SOB is worse. She denies any melena or BRBPR in last few days. She does have anal fissure and hemorrhoids and bleeds when she is constipated. She denies hematuria or post menopausal bleeding. Denies weight loss. Does not take any NSAIDs or aspirin. She has not followed up with nephrology before.\par Family history is significant for breast cancer in her mother. She is a former smoker stopped 20 years ago.\par She is uptodate with mammogram and Pap smear. \par  [de-identified] : 09/17/2020 Patient returns for follow up , she feels better after venofer X 4 and on EPO 10,000 weekly , Hb is up to 10 . \par \par 10/15/2020 Patient returns for follow up 2 weeks after last dose of EPO , today's Hb is 10.7 . she offers no new complaints . \par \par 04/08/2021 Patient returns for follow up for anemia on EPO and iron replacement . Hb is 10.8 . she complains of mild left arm pain after she started to self check her BP . ahe meds were recently adjusted by her PMD . \par \par 08/31/2021 patient returns for follow up , she offers no new complaints , her Hgb is slowly trending down after last transfusion and venofer X1 for ferritin : 35 . \par \par 11/18/2021 patient returns for follow up complaining of weakness and dyspnea on minimal exertion , still with lower extremity edema R > L . Hgb is 7.1 2 weeks after transfusion and despite venofer and EPO . She denies melena or hematochezia . Of note she had suspected small bowel bleeding on enteroscopy or capsule endoscopy and was intolerant to push enteroscopy ( hypotension ? ) \par 2/14/22- Patient is here for follow up visit for management of DARRIN. Hgb is 6.7  3 days only after last transfusion , she reports several episodes of bleeding presumably from hemorrhoids and is using topical medications , Ferritin levels remain low despite venofer . She complains of weakness, bilateral leg edema . no chest pain or SOB . \par \par 12/2/21: patient returns for follow up for DARRIN secondary  obscure GI bleeding  and CKD. \par She is feeling better today. We will continue  Venofer infusion weekly X 3 and Procrit.\par Close monitor CBC on weekly basis with possible blood transfusion if required. \par \par 5/19/2022 Patient returns for follow up , she lost her  last week to lung cancer . He Hgb is still 7.2  two weeks after transfusion and despite weekly venofer . Ferritin is trending down . She is noted with weight loss and complains of abdominal cramps and pain radiating from epigastric area and RUQ , radiating to the back .\par Of note CT scan from 1/2022 showed prominent adenopathy involving mesenteric and retroperitoneal lymph nodes. \par \par \par 8/8/2022 Patient returns for chronic iron deficiency anemia , she is requiring transfusion every 2 weeks . today's Hgb is 6.9 however she denies hematochezia , increased weakness , dizziness , chest pain or palpitations . She continues with mild RUQ pain radiating to the back. \par \par \par

## 2022-08-15 ENCOUNTER — LABORATORY RESULT (OUTPATIENT)
Age: 77
End: 2022-08-15

## 2022-08-15 ENCOUNTER — APPOINTMENT (OUTPATIENT)
Dept: INFUSION THERAPY | Facility: CLINIC | Age: 77
End: 2022-08-15

## 2022-08-15 VITALS
DIASTOLIC BLOOD PRESSURE: 57 MMHG | RESPIRATION RATE: 16 BRPM | HEART RATE: 69 BPM | TEMPERATURE: 97.3 F | SYSTOLIC BLOOD PRESSURE: 127 MMHG

## 2022-08-15 LAB
BLD GP AB SCN SERPL QL: NORMAL
HCT VFR BLD CALC: 27.8 %
HGB BLD-MCNC: 8.9 G/DL
MCHC RBC-ENTMCNC: 29.5 PG
MCHC RBC-ENTMCNC: 32 G/DL
MCV RBC AUTO: 92.1 FL
PLATELET # BLD AUTO: 107 K/UL
PMV BLD: 9.1 FL
RBC # BLD: 3.02 M/UL
RBC # FLD: 18.3 %
WBC # FLD AUTO: 3.65 K/UL

## 2022-08-15 RX ORDER — ERYTHROPOIETIN 10000 [IU]/ML
30000 INJECTION, SOLUTION INTRAVENOUS; SUBCUTANEOUS ONCE
Refills: 0 | Status: COMPLETED | OUTPATIENT
Start: 2022-08-15 | End: 2022-11-21

## 2022-08-15 RX ADMIN — ERYTHROPOIETIN 30000 UNIT(S): 10000 INJECTION, SOLUTION INTRAVENOUS; SUBCUTANEOUS at 14:11

## 2022-08-17 ENCOUNTER — APPOINTMENT (OUTPATIENT)
Dept: INFUSION THERAPY | Facility: CLINIC | Age: 77
End: 2022-08-17

## 2022-08-18 ENCOUNTER — OUTPATIENT (OUTPATIENT)
Dept: OUTPATIENT SERVICES | Facility: HOSPITAL | Age: 77
LOS: 1 days | Discharge: HOME | End: 2022-08-18

## 2022-08-18 VITALS
OXYGEN SATURATION: 97 % | HEART RATE: 58 BPM | RESPIRATION RATE: 16 BRPM | WEIGHT: 235.89 LBS | TEMPERATURE: 98 F | HEIGHT: 67 IN | DIASTOLIC BLOOD PRESSURE: 60 MMHG | SYSTOLIC BLOOD PRESSURE: 120 MMHG

## 2022-08-18 DIAGNOSIS — Z01.818 ENCOUNTER FOR OTHER PREPROCEDURAL EXAMINATION: ICD-10-CM

## 2022-08-18 DIAGNOSIS — D64.9 ANEMIA, UNSPECIFIED: ICD-10-CM

## 2022-08-18 DIAGNOSIS — Z87.19 PERSONAL HISTORY OF OTHER DISEASES OF THE DIGESTIVE SYSTEM: Chronic | ICD-10-CM

## 2022-08-18 LAB
ALBUMIN SERPL ELPH-MCNC: 3.6 G/DL — SIGNIFICANT CHANGE UP (ref 3.5–5.2)
ALP SERPL-CCNC: 65 U/L — SIGNIFICANT CHANGE UP (ref 30–115)
ALT FLD-CCNC: 5 U/L — SIGNIFICANT CHANGE UP (ref 0–41)
ANION GAP SERPL CALC-SCNC: 9 MMOL/L — SIGNIFICANT CHANGE UP (ref 7–14)
ANISOCYTOSIS BLD QL: SIGNIFICANT CHANGE UP
APTT BLD: 32.5 SEC — SIGNIFICANT CHANGE UP (ref 27–39.2)
AST SERPL-CCNC: 14 U/L — SIGNIFICANT CHANGE UP (ref 0–41)
BASOPHILS # BLD AUTO: 0.06 K/UL — SIGNIFICANT CHANGE UP (ref 0–0.2)
BASOPHILS NFR BLD AUTO: 2 % — HIGH (ref 0–1)
BILIRUB SERPL-MCNC: 0.7 MG/DL — SIGNIFICANT CHANGE UP (ref 0.2–1.2)
BUN SERPL-MCNC: 47 MG/DL — HIGH (ref 10–20)
CALCIUM SERPL-MCNC: 8.7 MG/DL — SIGNIFICANT CHANGE UP (ref 8.5–10.1)
CHLORIDE SERPL-SCNC: 112 MMOL/L — HIGH (ref 98–110)
CO2 SERPL-SCNC: 21 MMOL/L — SIGNIFICANT CHANGE UP (ref 17–32)
CREAT SERPL-MCNC: 2.3 MG/DL — HIGH (ref 0.7–1.5)
EGFR: 21 ML/MIN/1.73M2 — LOW
EOSINOPHIL # BLD AUTO: 0.11 K/UL — SIGNIFICANT CHANGE UP (ref 0–0.7)
EOSINOPHIL NFR BLD AUTO: 4 % — SIGNIFICANT CHANGE UP (ref 0–8)
GLUCOSE SERPL-MCNC: 70 MG/DL — SIGNIFICANT CHANGE UP (ref 70–99)
HCT VFR BLD CALC: 25.5 % — LOW (ref 37–47)
HGB BLD-MCNC: 7.7 G/DL — LOW (ref 12–16)
HYPOCHROMIA BLD QL: SLIGHT — SIGNIFICANT CHANGE UP
INR BLD: 0.99 RATIO — SIGNIFICANT CHANGE UP (ref 0.65–1.3)
LYMPHOCYTES # BLD AUTO: 0.4 K/UL — LOW (ref 1–3.3)
LYMPHOCYTES # BLD AUTO: 14 % — SIGNIFICANT CHANGE UP (ref 13–44)
MANUAL SMEAR VERIFICATION: SIGNIFICANT CHANGE UP
MCHC RBC-ENTMCNC: 28.4 PG — SIGNIFICANT CHANGE UP (ref 27–31)
MCHC RBC-ENTMCNC: 30.2 G/DL — LOW (ref 32–37)
MCV RBC AUTO: 94.1 FL — SIGNIFICANT CHANGE UP (ref 81–99)
MONOCYTES # BLD AUTO: 0.29 K/UL — SIGNIFICANT CHANGE UP (ref 0–0.9)
MONOCYTES NFR BLD AUTO: 10 % — HIGH (ref 1.7–9.3)
NEUTROPHILS # BLD AUTO: 2 K/UL — SIGNIFICANT CHANGE UP (ref 1.4–6.5)
NEUTROPHILS NFR BLD AUTO: 62 % — SIGNIFICANT CHANGE UP (ref 42.2–75.2)
NEUTS BAND # BLD: 8 % — HIGH (ref 0–6)
NRBC # BLD: 0 /100 — SIGNIFICANT CHANGE UP (ref 0–0)
NRBC # BLD: SIGNIFICANT CHANGE UP /100 WBCS (ref 0–0)
OVALOCYTES BLD QL SMEAR: SLIGHT — SIGNIFICANT CHANGE UP
PLAT MORPH BLD: NORMAL — SIGNIFICANT CHANGE UP
PLATELET # BLD AUTO: 100 K/UL — LOW (ref 130–400)
POTASSIUM SERPL-MCNC: 4.8 MMOL/L — SIGNIFICANT CHANGE UP (ref 3.5–5)
POTASSIUM SERPL-SCNC: 4.8 MMOL/L — SIGNIFICANT CHANGE UP (ref 3.5–5)
PROT SERPL-MCNC: 6 G/DL — SIGNIFICANT CHANGE UP (ref 6–8)
PROTHROM AB SERPL-ACNC: 11.4 SEC — SIGNIFICANT CHANGE UP (ref 9.95–12.87)
RBC # BLD: 2.71 M/UL — LOW (ref 4.2–5.4)
RBC # FLD: 17.6 % — HIGH (ref 11.5–14.5)
RBC BLD AUTO: ABNORMAL
SMUDGE CELLS # BLD: PRESENT — SIGNIFICANT CHANGE UP
SODIUM SERPL-SCNC: 142 MMOL/L — SIGNIFICANT CHANGE UP (ref 135–146)
WBC # BLD: 2.85 K/UL — LOW (ref 4.8–10.8)
WBC # FLD AUTO: 2.85 K/UL — LOW (ref 4.8–10.8)

## 2022-08-18 NOTE — H&P PST ADULT - NSANTHBMIRD_ENT_A_CORE
[FreeTextEntry1] : Assessment:\par 53yo LH WW, with above PMH.\par Recent concussion with resulting neck tightness, HA and visual issues.\par Likely mild misalignment. No overt palinopsia. \par She has adjustment saccades. \par \par Diagnostic Impression:\par -post concussion syndrome\par \par Plan:\par -gradual return to activities\par -continue PT\par -see [facility] (Dr. Healy) \par -PRN NSAIDS\par -Flexeril 5mg QHS PRN\par \par A thorough discussion was entertained with the patient/caregiver regarding the use of psychoactive medications, their possible benefits and AE profile, including the risk of cardiovascular complications, including but not limited to applicable black box warning and teratogenicity, where appropriate.\par We discussed the benefits of being active, physically and mentally, and the need to to establish a routine in this respect.\par Driving abilities and firearms possession and use were discussed, in relation to progression of the cognitive decline, and the need to assess them periodically.\par Patient/caregiver advised to bring previous records to this office.\par All questions were answered at the time of the visit. We are certainly available for further discussion as needed.\par Patient/caregiver fully understands and agree with the plan.\par 
No

## 2022-08-18 NOTE — H&P PST ADULT - NSICDXPASTMEDICALHX_GEN_ALL_CORE_FT
PAST MEDICAL HISTORY:  2019 novel coronavirus disease (COVID-19) 4/2020- REHAB admission    Anemia iron infusions    Aortic stenosis     Chronic kidney disease (CKD)     Eczema     Heart murmur     HTN (hypertension)      PAST MEDICAL HISTORY:  2019 novel coronavirus disease (COVID-19) 4/2020- REHAB admission    Anemia iron infusions and PRBC transfusions    Aortic stenosis     Chronic kidney disease (CKD)     Eczema     Heart murmur     HTN (hypertension)

## 2022-08-18 NOTE — H&P PST ADULT - HISTORY OF PRESENT ILLNESS
76yr OLD FEMALE STATES HAS BEEN GETTING IRON INFUSIONS "I don't have good veins so they need to put in a port is scheduled FOR port placement. Denies COVID S/S. Did not receive vaccine. Verbalized understanding of COVID prevention measures. Exercise lilia 1-2 flat blocks slowly .  Anesthesia Alert  YES--Difficult Airway  NO--History of neck surgery or radiation  NO--Limited ROM of neck  NO--History of Malignant hyperthermia  NO--Personal or family history of Pseudocholinesterase deficiency  NO--Prior Anesthesia Complication  yes--Latex Allergy  NO--Loose teeth  NO--History of Rheumatoid Arthritis  NO--JOSETTE  No Bleeding risk  NO--Other_____

## 2022-08-21 ENCOUNTER — LABORATORY RESULT (OUTPATIENT)
Age: 77
End: 2022-08-21

## 2022-08-22 ENCOUNTER — LABORATORY RESULT (OUTPATIENT)
Age: 77
End: 2022-08-22

## 2022-08-22 ENCOUNTER — NON-APPOINTMENT (OUTPATIENT)
Age: 77
End: 2022-08-22

## 2022-08-22 ENCOUNTER — APPOINTMENT (OUTPATIENT)
Dept: INFUSION THERAPY | Facility: CLINIC | Age: 77
End: 2022-08-22

## 2022-08-22 PROBLEM — U07.1 COVID-19: Chronic | Status: ACTIVE | Noted: 2022-08-18

## 2022-08-22 PROBLEM — N18.9 CHRONIC KIDNEY DISEASE, UNSPECIFIED: Chronic | Status: ACTIVE | Noted: 2022-08-18

## 2022-08-22 LAB
ABO + RH PNL BLD: NORMAL
BLD GP AB SCN SERPL QL: NORMAL
HCT VFR BLD CALC: 22.4 %
HGB BLD-MCNC: 6.6 G/DL
MCHC RBC-ENTMCNC: 27.7 PG
MCHC RBC-ENTMCNC: 29.5 G/DL
MCV RBC AUTO: 94.1 FL
PLATELET # BLD AUTO: 111 K/UL
PMV BLD: 9.8 FL
RBC # BLD: 2.38 M/UL
RBC # FLD: 17.2 %
WBC # FLD AUTO: 2.86 K/UL

## 2022-08-22 RX ORDER — ERYTHROPOIETIN 10000 [IU]/ML
30000 INJECTION, SOLUTION INTRAVENOUS; SUBCUTANEOUS ONCE
Refills: 0 | Status: COMPLETED | OUTPATIENT
Start: 2022-08-22 | End: 2022-08-22

## 2022-08-22 RX ADMIN — ERYTHROPOIETIN 30000 UNIT(S): 10000 INJECTION, SOLUTION INTRAVENOUS; SUBCUTANEOUS at 14:33

## 2022-08-23 ENCOUNTER — APPOINTMENT (OUTPATIENT)
Dept: INFUSION THERAPY | Facility: CLINIC | Age: 77
End: 2022-08-23

## 2022-08-23 RX ORDER — FUROSEMIDE 40 MG
20 TABLET ORAL ONCE
Refills: 0 | Status: COMPLETED | OUTPATIENT
Start: 2022-08-23 | End: 2022-08-23

## 2022-08-23 RX ADMIN — Medication 20 MILLIGRAM(S): at 17:22

## 2022-08-25 ENCOUNTER — RESULT REVIEW (OUTPATIENT)
Age: 77
End: 2022-08-25

## 2022-08-25 ENCOUNTER — OUTPATIENT (OUTPATIENT)
Dept: OUTPATIENT SERVICES | Facility: HOSPITAL | Age: 77
LOS: 1 days | Discharge: HOME | End: 2022-08-25

## 2022-08-25 DIAGNOSIS — Z87.19 PERSONAL HISTORY OF OTHER DISEASES OF THE DIGESTIVE SYSTEM: Chronic | ICD-10-CM

## 2022-08-25 PROCEDURE — 76937 US GUIDE VASCULAR ACCESS: CPT | Mod: 26

## 2022-08-25 PROCEDURE — 36561 INSERT TUNNELED CV CATH: CPT

## 2022-08-25 PROCEDURE — 77001 FLUOROGUIDE FOR VEIN DEVICE: CPT | Mod: 26

## 2022-08-25 RX ORDER — VANCOMYCIN HCL 1 G
1500 VIAL (EA) INTRAVENOUS ONCE
Refills: 0 | Status: DISCONTINUED | OUTPATIENT
Start: 2022-08-25 | End: 2022-08-25

## 2022-08-25 RX ORDER — VANCOMYCIN HCL 1 G
1500 VIAL (EA) INTRAVENOUS ONCE
Refills: 0 | Status: DISCONTINUED | OUTPATIENT
Start: 2022-08-25 | End: 2022-09-08

## 2022-08-25 NOTE — CHART NOTE - NSCHARTNOTEFT_GEN_A_CORE
PACU ANESTHESIA ADMISSION NOTE      Procedure:   Post op diagnosis:      ____  Intubated  TV:______       Rate: ______      FiO2: ______    __x__  Patent Airway    _x___  Full return of protective reflexes    ___x_  Full recovery from anesthesia / back to baseline status    Vitals:  T(C): --  HR: 68  BP: 117/57  RR: 16  SpO2: 97%    Mental Status:  __x__ Awake   ____x_ Alert   _____ Drowsy   _____ Sedated    Nausea/Vomiting:  __x__ NO  ______Yes,   See Post - Op Orders          Pain Scale (0-10):  ___5__    Treatment: ____ None    ___x_ See Post - Op/PCA Orders    Post - Operative Fluids:   ____ Oral   __x__ See Post - Op Orders    Plan: Discharge:   ____Home       __x___Floor     _____Critical Care    _____  Other:_________________    Comments: Transferred care to IR RN.

## 2022-08-29 ENCOUNTER — LABORATORY RESULT (OUTPATIENT)
Age: 77
End: 2022-08-29

## 2022-08-29 ENCOUNTER — APPOINTMENT (OUTPATIENT)
Dept: INFUSION THERAPY | Facility: CLINIC | Age: 77
End: 2022-08-29

## 2022-08-29 VITALS
TEMPERATURE: 97.9 F | RESPIRATION RATE: 16 BRPM | SYSTOLIC BLOOD PRESSURE: 128 MMHG | DIASTOLIC BLOOD PRESSURE: 58 MMHG | HEART RATE: 71 BPM

## 2022-08-29 LAB
HCT VFR BLD CALC: 25 %
HGB BLD-MCNC: 7.6 G/DL
MCHC RBC-ENTMCNC: 28 PG
MCHC RBC-ENTMCNC: 30.4 G/DL
MCV RBC AUTO: 92.3 FL
PLATELET # BLD AUTO: 134 K/UL
PMV BLD: 9.9 FL
RBC # BLD: 2.71 M/UL
RBC # FLD: 16.3 %
WBC # FLD AUTO: 3.53 K/UL

## 2022-08-29 RX ORDER — ERYTHROPOIETIN 10000 [IU]/ML
30000 INJECTION, SOLUTION INTRAVENOUS; SUBCUTANEOUS ONCE
Refills: 0 | Status: COMPLETED | OUTPATIENT
Start: 2022-08-29 | End: 2022-08-29

## 2022-08-29 RX ADMIN — ERYTHROPOIETIN 30000 UNIT(S): 10000 INJECTION, SOLUTION INTRAVENOUS; SUBCUTANEOUS at 14:40

## 2022-08-30 ENCOUNTER — APPOINTMENT (OUTPATIENT)
Dept: INFUSION THERAPY | Facility: CLINIC | Age: 77
End: 2022-08-30

## 2022-08-30 ENCOUNTER — OUTPATIENT (OUTPATIENT)
Dept: OUTPATIENT SERVICES | Facility: HOSPITAL | Age: 77
LOS: 1 days | Discharge: HOME | End: 2022-08-30

## 2022-08-30 DIAGNOSIS — D64.9 ANEMIA, UNSPECIFIED: ICD-10-CM

## 2022-08-30 DIAGNOSIS — R59.9 ENLARGED LYMPH NODES, UNSPECIFIED: ICD-10-CM

## 2022-08-30 DIAGNOSIS — Z87.19 PERSONAL HISTORY OF OTHER DISEASES OF THE DIGESTIVE SYSTEM: Chronic | ICD-10-CM

## 2022-08-30 DIAGNOSIS — E61.1 IRON DEFICIENCY: ICD-10-CM

## 2022-08-30 LAB
ABO + RH PNL BLD: NORMAL
BLD GP AB SCN SERPL QL: NORMAL

## 2022-09-04 NOTE — ED ADULT NURSE NOTE - PMH
Eczema    Heart murmur    HTN (hypertension) Patient/Caregiver provided printed discharge information.

## 2022-09-06 ENCOUNTER — APPOINTMENT (OUTPATIENT)
Dept: INFUSION THERAPY | Facility: CLINIC | Age: 77
End: 2022-09-06

## 2022-09-06 ENCOUNTER — EMERGENCY (EMERGENCY)
Facility: HOSPITAL | Age: 77
LOS: 0 days | Discharge: HOME | End: 2022-09-07
Attending: EMERGENCY MEDICINE | Admitting: EMERGENCY MEDICINE

## 2022-09-06 ENCOUNTER — APPOINTMENT (OUTPATIENT)
Dept: HEMATOLOGY ONCOLOGY | Facility: CLINIC | Age: 77
End: 2022-09-06

## 2022-09-06 ENCOUNTER — OUTPATIENT (OUTPATIENT)
Dept: OUTPATIENT SERVICES | Facility: HOSPITAL | Age: 77
LOS: 1 days | Discharge: HOME | End: 2022-09-06

## 2022-09-06 VITALS
TEMPERATURE: 98.2 F | RESPIRATION RATE: 16 BRPM | DIASTOLIC BLOOD PRESSURE: 58 MMHG | WEIGHT: 235 LBS | BODY MASS INDEX: 37.77 KG/M2 | HEIGHT: 66 IN | HEART RATE: 67 BPM | SYSTOLIC BLOOD PRESSURE: 108 MMHG

## 2022-09-06 VITALS
OXYGEN SATURATION: 99 % | RESPIRATION RATE: 18 BRPM | WEIGHT: 238.1 LBS | HEART RATE: 70 BPM | SYSTOLIC BLOOD PRESSURE: 113 MMHG | DIASTOLIC BLOOD PRESSURE: 49 MMHG | TEMPERATURE: 97 F | HEIGHT: 67 IN

## 2022-09-06 DIAGNOSIS — Z88.6 ALLERGY STATUS TO ANALGESIC AGENT: ICD-10-CM

## 2022-09-06 DIAGNOSIS — Z87.19 PERSONAL HISTORY OF OTHER DISEASES OF THE DIGESTIVE SYSTEM: Chronic | ICD-10-CM

## 2022-09-06 DIAGNOSIS — N18.9 CHRONIC KIDNEY DISEASE, UNSPECIFIED: ICD-10-CM

## 2022-09-06 DIAGNOSIS — Z91.040 LATEX ALLERGY STATUS: ICD-10-CM

## 2022-09-06 DIAGNOSIS — R01.1 CARDIAC MURMUR, UNSPECIFIED: ICD-10-CM

## 2022-09-06 DIAGNOSIS — Z90.49 ACQUIRED ABSENCE OF OTHER SPECIFIED PARTS OF DIGESTIVE TRACT: ICD-10-CM

## 2022-09-06 DIAGNOSIS — R10.31 RIGHT LOWER QUADRANT PAIN: ICD-10-CM

## 2022-09-06 DIAGNOSIS — R42 DIZZINESS AND GIDDINESS: ICD-10-CM

## 2022-09-06 DIAGNOSIS — Z88.0 ALLERGY STATUS TO PENICILLIN: ICD-10-CM

## 2022-09-06 DIAGNOSIS — Z45.2 ENCOUNTER FOR ADJUSTMENT AND MANAGEMENT OF VASCULAR ACCESS DEVICE: ICD-10-CM

## 2022-09-06 DIAGNOSIS — Z20.822 CONTACT WITH AND (SUSPECTED) EXPOSURE TO COVID-19: ICD-10-CM

## 2022-09-06 DIAGNOSIS — I35.0 NONRHEUMATIC AORTIC (VALVE) STENOSIS: ICD-10-CM

## 2022-09-06 DIAGNOSIS — R06.02 SHORTNESS OF BREATH: ICD-10-CM

## 2022-09-06 DIAGNOSIS — D63.1 ANEMIA IN CHRONIC KIDNEY DISEASE: ICD-10-CM

## 2022-09-06 DIAGNOSIS — E11.22 TYPE 2 DIABETES MELLITUS WITH DIABETIC CHRONIC KIDNEY DISEASE: ICD-10-CM

## 2022-09-06 DIAGNOSIS — Z84.1 FAMILY HISTORY OF DISORDERS OF KIDNEY AND URETER: ICD-10-CM

## 2022-09-06 PROBLEM — D64.9 ANEMIA, UNSPECIFIED: Chronic | Status: ACTIVE | Noted: 2020-01-07

## 2022-09-06 LAB
ALBUMIN SERPL ELPH-MCNC: 3.6 G/DL — SIGNIFICANT CHANGE UP (ref 3.5–5.2)
ALP SERPL-CCNC: 77 U/L — SIGNIFICANT CHANGE UP (ref 30–115)
ALT FLD-CCNC: <5 U/L — SIGNIFICANT CHANGE UP (ref 0–41)
ANION GAP SERPL CALC-SCNC: 10 MMOL/L — SIGNIFICANT CHANGE UP (ref 7–14)
ANISOCYTOSIS BLD QL: SIGNIFICANT CHANGE UP
AST SERPL-CCNC: 14 U/L — SIGNIFICANT CHANGE UP (ref 0–41)
BASOPHILS # BLD AUTO: 0.01 K/UL
BASOPHILS # BLD AUTO: 0.02 K/UL — SIGNIFICANT CHANGE UP (ref 0–0.2)
BASOPHILS NFR BLD AUTO: 0.3 %
BASOPHILS NFR BLD AUTO: 1 % — SIGNIFICANT CHANGE UP (ref 0–1)
BILIRUB SERPL-MCNC: 0.5 MG/DL — SIGNIFICANT CHANGE UP (ref 0.2–1.2)
BLD GP AB SCN SERPL QL: SIGNIFICANT CHANGE UP
BUN SERPL-MCNC: 51 MG/DL — HIGH (ref 10–20)
BURR CELLS BLD QL SMEAR: PRESENT — SIGNIFICANT CHANGE UP
CALCIUM SERPL-MCNC: 8.2 MG/DL — LOW (ref 8.5–10.1)
CHLORIDE SERPL-SCNC: 115 MMOL/L — HIGH (ref 98–110)
CO2 SERPL-SCNC: 18 MMOL/L — SIGNIFICANT CHANGE UP (ref 17–32)
CREAT SERPL-MCNC: 2.4 MG/DL — HIGH (ref 0.7–1.5)
EGFR: 20 ML/MIN/1.73M2 — LOW
EOSINOPHIL # BLD AUTO: 0.12 K/UL — SIGNIFICANT CHANGE UP (ref 0–0.7)
EOSINOPHIL # BLD AUTO: 0.25 K/UL
EOSINOPHIL NFR BLD AUTO: 5 % — SIGNIFICANT CHANGE UP (ref 0–8)
EOSINOPHIL NFR BLD AUTO: 7 %
GLUCOSE SERPL-MCNC: 92 MG/DL — SIGNIFICANT CHANGE UP (ref 70–99)
HCT VFR BLD CALC: 18.7 % — LOW (ref 37–47)
HCT VFR BLD CALC: 19 %
HGB BLD-MCNC: 5.6 G/DL — CRITICAL LOW (ref 12–16)
HGB BLD-MCNC: 5.7 G/DL
IMM GRANULOCYTES NFR BLD AUTO: 0.3 %
LYMPHOCYTES # BLD AUTO: 0.54 K/UL — LOW (ref 1.2–3.4)
LYMPHOCYTES # BLD AUTO: 0.74 K/UL
LYMPHOCYTES # BLD AUTO: 22 % — SIGNIFICANT CHANGE UP (ref 20.5–51.1)
LYMPHOCYTES NFR BLD AUTO: 20.8 %
MAN DIFF?: NORMAL
MANUAL SMEAR VERIFICATION: SIGNIFICANT CHANGE UP
MCHC RBC-ENTMCNC: 25.7 PG
MCHC RBC-ENTMCNC: 25.8 PG — LOW (ref 27–31)
MCHC RBC-ENTMCNC: 29.9 G/DL — LOW (ref 32–37)
MCHC RBC-ENTMCNC: 30 G/DL
MCV RBC AUTO: 85.6 FL
MCV RBC AUTO: 86.2 FL — SIGNIFICANT CHANGE UP (ref 81–99)
MICROCYTES BLD QL: SIGNIFICANT CHANGE UP
MONOCYTES # BLD AUTO: 0.22 K/UL — SIGNIFICANT CHANGE UP (ref 0.1–0.6)
MONOCYTES # BLD AUTO: 0.41 K/UL
MONOCYTES NFR BLD AUTO: 11.5 %
MONOCYTES NFR BLD AUTO: 9 % — SIGNIFICANT CHANGE UP (ref 1.7–9.3)
NEUTROPHILS # BLD AUTO: 1.54 K/UL — SIGNIFICANT CHANGE UP (ref 1.4–6.5)
NEUTROPHILS # BLD AUTO: 2.13 K/UL
NEUTROPHILS NFR BLD AUTO: 60.1 %
NEUTROPHILS NFR BLD AUTO: 62 % — SIGNIFICANT CHANGE UP (ref 42.2–75.2)
NEUTS BAND # BLD: 1 % — SIGNIFICANT CHANGE UP (ref 0–6)
NRBC # BLD: 0 /100 — SIGNIFICANT CHANGE UP (ref 0–0)
NRBC # BLD: SIGNIFICANT CHANGE UP /100 WBCS (ref 0–0)
OVALOCYTES BLD QL SMEAR: SLIGHT — SIGNIFICANT CHANGE UP
PLAT MORPH BLD: NORMAL — SIGNIFICANT CHANGE UP
PLATELET # BLD AUTO: 104 K/UL — LOW (ref 130–400)
PLATELET # BLD AUTO: 124 K/UL
POIKILOCYTOSIS BLD QL AUTO: SIGNIFICANT CHANGE UP
POLYCHROMASIA BLD QL SMEAR: SIGNIFICANT CHANGE UP
POTASSIUM SERPL-MCNC: 4.6 MMOL/L — SIGNIFICANT CHANGE UP (ref 3.5–5)
POTASSIUM SERPL-SCNC: 4.6 MMOL/L — SIGNIFICANT CHANGE UP (ref 3.5–5)
PROT SERPL-MCNC: 5.7 G/DL — LOW (ref 6–8)
RBC # BLD: 2.17 M/UL — LOW (ref 4.2–5.4)
RBC # BLD: 2.22 M/UL
RBC # FLD: 19.8 %
RBC # FLD: 19.9 % — HIGH (ref 11.5–14.5)
RBC BLD AUTO: ABNORMAL
SARS-COV-2 RNA SPEC QL NAA+PROBE: SIGNIFICANT CHANGE UP
SMUDGE CELLS # BLD: PRESENT — SIGNIFICANT CHANGE UP
SODIUM SERPL-SCNC: 143 MMOL/L — SIGNIFICANT CHANGE UP (ref 135–146)
TROPONIN T SERPL-MCNC: <0.01 NG/ML — SIGNIFICANT CHANGE UP
WBC # BLD: 2.44 K/UL — LOW (ref 4.8–10.8)
WBC # FLD AUTO: 2.44 K/UL — LOW (ref 4.8–10.8)
WBC # FLD AUTO: 3.55 K/UL

## 2022-09-06 PROCEDURE — 99213 OFFICE O/P EST LOW 20 MIN: CPT

## 2022-09-06 PROCEDURE — 93010 ELECTROCARDIOGRAM REPORT: CPT

## 2022-09-06 PROCEDURE — 74177 CT ABD & PELVIS W/CONTRAST: CPT | Mod: 26,MA

## 2022-09-06 PROCEDURE — 99220: CPT

## 2022-09-06 RX ORDER — ERYTHROPOIETIN 10000 [IU]/ML
30000 INJECTION, SOLUTION INTRAVENOUS; SUBCUTANEOUS ONCE
Refills: 0 | Status: COMPLETED | OUTPATIENT
Start: 2022-09-06 | End: 2022-09-06

## 2022-09-06 RX ORDER — DIPHENHYDRAMINE HCL 50 MG
25 CAPSULE ORAL ONCE
Refills: 0 | Status: COMPLETED | OUTPATIENT
Start: 2022-09-06 | End: 2022-09-06

## 2022-09-06 RX ADMIN — ERYTHROPOIETIN 30000 UNIT(S): 10000 INJECTION, SOLUTION INTRAVENOUS; SUBCUTANEOUS at 12:59

## 2022-09-06 RX ADMIN — Medication 125 MILLIGRAM(S): at 21:18

## 2022-09-06 RX ADMIN — Medication 25 MILLIGRAM(S): at 21:17

## 2022-09-06 NOTE — ED ADULT NURSE NOTE - OBJECTIVE STATEMENT
pt a&ox3, pt brought to ED for low hemoglobin, pt c/o lightheadedness, and weakness, pt cold to touch and appears pale, no SOB, unlabored breathing, equal rise & fall, no N/V/D, denies pain, PMH located in chart, As per pt endoscopy can not be performed secondary to high risk, PMH of leaky heart brett and kidney concerns.

## 2022-09-06 NOTE — ED ADULT NURSE NOTE - BREATH SOUNDS, MLM
----- Message from Juliet Garcia RN sent at 8/3/2020  3:37 PM CDT -----  Regarding: FW: Appt    ----- Message -----  From: Mony Slade  Sent: 8/3/2020   2:40 PM CDT  To: Reagan Vasquez Staff  Subject: Appt                                             Pt is calling to speak with Staff regarding rescheduling her appt on 8/18/20.    She can be reached at [phone].    Thank you.      
Returned call to Ms Adame, no answer. Message left on .   
Clear

## 2022-09-06 NOTE — ED ADULT NURSE NOTE - CHIEF COMPLAINT QUOTE
Risk Assessment    The following ratings are based on my observation of this patient over the last session  Risk of Harm to Self:   Demographic risk factors include , alaskan, or native [place] and [address]  Historical Risk Factors include: chronic psychiatric problems and history of suicidal behaviors/attempts  Recent Specific Risk Factors include: experienced persistent ideation, sense of hopelessness/helplessness, unable to visualize a realistic positive future, feelings of guilt or self blame, worries about finances or work and diagnosis of depression  These risk factors presented within the last year  Risk of Harm to Others:   Based on the above information, the client presents the following risk of harm to self or others: moderate  The following interventions are recommended: no intervention changes  Notes regarding this Risk Assessment: Pt contracted for safety and is well known to this worker  Active Problems    1  Abnormal weight gain (783 1) (R63 5)   2  Acute diverticulitis (562 11) (K57 92)   3  Acute pain (338 19) (R52)   4  Allergic rhinitis (477 9) (J30 9)   5  Amenorrhea (626 0) (N91 2)   6  Bipolar 1 disorder, depressed, moderate (296 52) (F31 32)   7  Bulging lumbar disc (722 10) (M51 26)   8  Dizziness (780 4) (R42)   9  Dysfunctional uterine bleeding (626 8) (N93 8)   10  Dysuria (788 1) (R30 0)   11  Eating disorder, unspecified (307 50) (F50 9)   12  Encounter for routine gynecological examination (V72 31) (Z01 419)   13  Esophageal reflux (530 81) (K21 9)   14  Foot pain (729 5) (M79 673)   15  EILEEN (generalized anxiety disorder) (300 02) (F41 1)   16  Gambling disorder, moderate (312 31) (F63 0)   17  Hypertension (401 9) (I10)   18  Hypothyroidism (244 9) (E03 9)   19  Long term current use of therapeutic drug (V58 69) (Z79 899)   20  Lumbago (724 2) (M54 5)   21  Lumbar degenerative disc disease (722 52) (M51 36)   22  Menorrhagia (626 2) (N92 0)   23   Morbid or severe obesity due to excess calories (278 01) (E66 01)   24  Obsessive-compulsive disorder (300 3) (F42)   25  Obstructive sleep apnea (327 23) (G47 33)   26  Otalgia, unspecified laterality (388 70) (H92 09)   27  Prediabetes (790 29) (R73 09)   28  RLS (restless legs syndrome) (333 94) (G25 81)   29  Urinary tract infection (599 0) (N39 0)   30  URTI (acute upper respiratory infection) (465 9) (J06 9)   31  Viral bronchitis (466 0) (J20 8)   32  Vitamin D deficiency (268 9) (E55 9)    Past Medical History    1  History of Abscess of face (682 0) (L02 01)   2  History of Acute nonsuppurative otitis media, unspecified laterality (381 00) (H65 199)   3  History of Backache (724 5) (M54 9)   4  History of Candidiasis, cutaneous (112 3) (B37 2)   5  History of Dog bite (879 8,E906 0) (W54  0XXA)   6  History of Dysfunction of left eustachian tube (381 81) (H69 82)   7  History of acute sinusitis (V12 69) (Z87 09)   8  Denied: History of head injury   9  History of Pseudotumor cerebri (348 2) (G93 2)   10  Denied: History of Seizures   11  History of Suicide and self-inflicted injury (E783 3) (X83 8XXA)    Future Appointments    Date/Time Provider Specialty Site   09/13/2016 05:00 PM Bárbara Pineda [place]   09/15/2016 01:00 PM Ivonne Campos [address] [facility]   09/23/2016 08:00 AM Bárbara Pineda [place]   09/27/2016 05:00 PM Dyllan Campos   09/30/2016 11:00 AM Bárbara Pineda [place]   10/03/2016 04:00 PM PAUL Espana   Psychiatry [facility]   10/06/2016 01:00 PM [address] [facility]   10/11/2016 05:00 PM [address]ined[address] [facility]   10/13/2016 01:00 PM Bárbara Pineda [place]   10/25/2016 05:00 PM Bárbara Pineda [place] 11/08/2016 05:00 PM Neva Slaughter [facility]   11/18/2016 03:00 PM PAUL Banerjee  Psychiatry [address] [facility]   11/22/2016 05:00 PM Phuong Pineda, [facility] [address] [facility]   12/06/2016 05:00 PM Phuong Pineda [place]   12/09/2016 08:10 AM PAUL Mckeon   Endocrinology [facility] END[facility]   12/20/2016 05:00 PM [facility]     Signatures   Electronically signed by : Lucy Zaman [facility]; Sep  8 2016  2:46PM EST                       (Author) Presented to ED c/o lightheadedness and SOB with hgb of ~5.

## 2022-09-06 NOTE — ED PROVIDER NOTE - OBJECTIVE STATEMENT
77 yo female w/ PMH of anemia w/ chronic transfusions, HTN, CKD presents for low hemoglobin.  Was noted to have hemoglobin of around 5 on outpt labs. Associated light-headedness, SOB, and RLQ abdominal pain. No chest pain, hematuria, blood in stool, N/V, or bleeding anywhere.

## 2022-09-06 NOTE — ED ADULT NURSE NOTE - NSICDXPASTMEDICALHX_GEN_ALL_CORE_FT
PAST MEDICAL HISTORY:  2019 novel coronavirus disease (COVID-19) 4/2020- REHAB admission    Anemia iron infusions and PRBC transfusions    Aortic stenosis     Chronic kidney disease (CKD)     Eczema     Heart murmur     HTN (hypertension)

## 2022-09-06 NOTE — ED ADULT NURSE NOTE - NSIMPLEMENTINTERV_GEN_ALL_ED
Implemented All Universal Safety Interventions:  [place] to call system. Call bell, personal items and telephone within reach. Instruct patient to call for assistance. Room bathroom lighting operational. Non-slip footwear when patient is off stretcher. Physically safe environment: no spills, clutter or unnecessary equipment. Stretcher in lowest position, wheels locked, appropriate side rails in place.

## 2022-09-06 NOTE — ED PROVIDER NOTE - PHYSICAL EXAMINATION
GENERAL: NAD   SKIN: warm, dry  HEAD: Normocephalic; atraumatic.  EYES: PERRLA, EOMI  CARD: S1, S2 normal; no murmurs, gallops, or rubs. Regular rate and rhythm.   RESP: LCTAB; No wheezes, rales, rhonchi, or stridor.  ABD: RLQ TTP. Soft and nondistended   Rectal exam: Chaperoned by KHOA Berg.  External hemorrhoid noted. No blood noted on OLEKSANDR   NEURO: Alert, oriented, grossly unremarkable  PSYCH: Cooperative, appropriate.

## 2022-09-06 NOTE — ED PROVIDER NOTE - CLINICAL SUMMARY MEDICAL DECISION MAKING FREE TEXT BOX
Labs with Hb 5.6. Workup otherwise unremarkable. Pt consented for transfusion and placed in CDU to transfuse and discharge if pt tolerates well.

## 2022-09-06 NOTE — ED PROVIDER NOTE - NS ED ROS FT
Constitutional: No fevers, chills, or malaise.  HEENT: No headache, visual changes  Cardiac:  Reports SOB. No chest pain, leg edema, or leg pain.  Respiratory:  No cough, respiratory distress, or hemoptysis.  GI: Reports abdominal pain. No nausea, vomiting, diarrhea  :  No dysuria, frequency, or urgency.  MS:  No myalgia, muscle weakness, joint pain or back pain.  Neuro:  No LOC, paralysis, or N/T.  Skin:  No skin rash.   Endocrine: No polyuria, polyphagia, or polydipsia.

## 2022-09-06 NOTE — ED PROVIDER NOTE - ATTENDING CONTRIBUTION TO CARE
75yo woman h/o anemia with prior transfusions, CKD, HTN presented for low outpt H/H. Pt reports mild SOB with exertion, fatigue and lightheadedness, but denies dark/bloody stools, nausea, vomiting. She does report some mild lower abdominal discomfort. On exam she is pale but not acutely toxic appearing, lungs CTA, CVS1S2 rRR, abd soft, mild lower abdominal tenderness without peritoneal signs. Labs, imaging, CDU for transfusion.

## 2022-09-07 ENCOUNTER — RX RENEWAL (OUTPATIENT)
Age: 77
End: 2022-09-07

## 2022-09-07 VITALS
SYSTOLIC BLOOD PRESSURE: 123 MMHG | HEART RATE: 89 BPM | TEMPERATURE: 97 F | RESPIRATION RATE: 18 BRPM | DIASTOLIC BLOOD PRESSURE: 54 MMHG | OXYGEN SATURATION: 98 %

## 2022-09-07 PROCEDURE — 99217: CPT

## 2022-09-07 NOTE — ASSESSMENT
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 ) , \par peripheral edema . \par Constipation , rectal bleeding ( hemorrhoids ? ) , not cleared for GI work up . \par Abdominal pain , weight loss . Prominent mesenteric lymph nodes . PET scan from 8/9/22 no suspicious lesions\par \par CBC reviewed with patient , anemia 5.7 g/dL, symptomatic with lightheadedness and SOB\par Plan : Refer to ED for 2 units of PRBCs today\par          Continue with 30,000 units of Retacrit weekly\par          Monitor ferritin (last Aug 8 = 189), resume venofer as needed

## 2022-09-07 NOTE — ED CDU PROVIDER DISPOSITION NOTE - PATIENT PORTAL LINK FT
You can access the FollowMyHealth Patient Portal offered by [facility] by registering at the following website: [email or web address]. By joining [facility]’s FollowMyHealth portal, you will also be able to view your health information using other applications (apps) compatible with our system.

## 2022-09-07 NOTE — PHYSICAL EXAM
[Obese] : obese [Normal] : clear to auscultation bilaterally, no dullness, no wheezing [de-identified] : pale in no acute distress.  [de-identified] : grade 3/6 systolic murmur  [de-identified] : lower extremity edema ( R > L ) 1 to 2 plus .  [de-identified] : mild diffuse tenderness , no rebound

## 2022-09-07 NOTE — ED CDU PROVIDER DISPOSITION NOTE - NS ED ATTENDING STATEMENT MOD
This was a shared visit with the TINO. I reviewed and verified the documentation and independently performed the documented: Attending with

## 2022-09-07 NOTE — REVIEW OF SYSTEMS
[Shortness Of Breath] : shortness of breath [Negative] : Gastrointestinal [FreeTextEntry2] : lightheadedness

## 2022-09-07 NOTE — ED CDU PROVIDER INITIAL DAY NOTE - PROGRESS NOTE DETAILS
blood transfusion completed. CTAB, feels much improved. Reviewed all results and necessity for follow up. Counseled on red flags and to return for them.  Patient appears well on discharge.

## 2022-09-07 NOTE — HISTORY OF PRESENT ILLNESS
[de-identified] : 09/17/2020 Patient returns for follow up , she feels better after venofer X 4 and on EPO 10,000 weekly , Hb is up to 10 . \par \par 10/15/2020 Patient returns for follow up 2 weeks after last dose of EPO , today's Hb is 10.7 . she offers no new complaints . \par \par 04/08/2021 Patient returns for follow up for anemia on EPO and iron replacement . Hb is 10.8 . she complains of mild left arm pain after she started to self check her BP . ahe meds were recently adjusted by her PMD . \par \par 08/31/2021 patient returns for follow up , she offers no new complaints , her Hgb is slowly trending down after last transfusion and venofer X1 for ferritin : 35 . \par \par 11/18/2021 patient returns for follow up complaining of weakness and dyspnea on minimal exertion , still with lower extremity edema R > L . Hgb is 7.1 2 weeks after transfusion and despite venofer and EPO . She denies melena or hematochezia . Of note she had suspected small bowel bleeding on enteroscopy or capsule endoscopy and was intolerant to push enteroscopy ( hypotension ? ) \par 2/14/22- Patient is here for follow up visit for management of DARRIN. Hgb is 6.7  3 days only after last transfusion , she reports several episodes of bleeding presumably from hemorrhoids and is using topical medications , Ferritin levels remain low despite venofer . She complains of weakness, bilateral leg edema . no chest pain or SOB . \par \par 12/2/21: patient returns for follow up for DARRIN secondary  obscure GI bleeding  and CKD. \par She is feeling better today. We will continue  Venofer infusion weekly X 3 and Procrit.\par Close monitor CBC on weekly basis with possible blood transfusion if required. \par \par 5/19/2022 Patient returns for follow up , she lost her  last week to lung cancer . He Hgb is still 7.2  two weeks after transfusion and despite weekly venofer . Ferritin is trending down . She is noted with weight loss and complains of abdominal cramps and pain radiating from epigastric area and RUQ , radiating to the back .\par Of note CT scan from 1/2022 showed prominent adenopathy involving mesenteric and retroperitoneal lymph nodes. \par \par \par 8/8/2022 Patient returns for chronic iron deficiency anemia , she is requiring transfusion every 2 weeks . today's Hgb is 6.9 however she denies hematochezia , increased weakness , dizziness , chest pain or palpitations . She continues with mild RUQ pain radiating to the back. \par \par 9/6/22: Patient returns for chronic iron deficiency anemia. She is complaining of lightheadedness and SOB since yesterday. CBC shows hemoglobin 5.7 g/dL, platelts 124 with normal ANC. She had a port placed since she has difficult venous access and requires frequent transfusions. Her PET scan was negative for suspicious lesions. She was referred to the ED for transfusion. Denies melena or hematochezia. \par \par  [de-identified] : bruce is a 74 year old white female with hypertension, chronic kidney disease, morbidly obese presents today with normocytic anemia. \par Her most recent CBC 04/01/2020 shows WBC -5.66, HB of 6.7, MCV -86.3, RDW - 17.3, platelet count- 196. Her Hb was normal until 08/2019. In October 2019 she noticed black tarry stools and was feeling tired. She went to ER and her Hb was 5.3. She was given 3 units of PRBC. Her iron studies at that showed ferritin of 8 and percent saturation of 5. She had EGD and colonoscopy which did not reveal any bleeding lesions. Following that event as per patient she was not given iron (?not sure why). She was readmitted to hospital in 01/2020 for SOB on exertion and her HB was noted to 6.0 again. She was given 2 units and her iron studies were consistent with DARRIN. B12 and folate were normal. Immunofixation showed weak IgG lambda migrating para protein. Free light chain ratio 2.1. Normal calcium. \par EGD and VCE was done. EGD revealed gastritis and VCE showed bleeding in small bowel. \par She was started on oral iron and she took for about three months. \par Her latest ferritin done in feb 2020 was 24 and percent saturation was 72% and her hb improved to 8.8. She also has push enteroscopy in 02/2020 and no bleeding lesions were found. Was recommended to have repeat colonoscopy and double balloon enteroscopy if she bleeds again.\par She went for blood work again on 04/01/2020 as she was feeling weak and having SOB and her hb dropped to 6.7. She was told by PMD to start on PROCRIT 10,000 units M-W-F. She was told her iron levels are good and she can stop iron. \par \par Today she feels very tired and her SOB is worse. She denies any melena or BRBPR in last few days. She does have anal fissure and hemorrhoids and bleeds when she is constipated. She denies hematuria or post menopausal bleeding. Denies weight loss. Does not take any NSAIDs or aspirin. She has not followed up with nephrology before.\par Family history is significant for breast cancer in her mother. She is a former smoker stopped 20 years ago.\par She is uptodate with mammogram and Pap smear. \par

## 2022-09-09 NOTE — ASU PATIENT PROFILE, ADULT - PT NEEDS ASSIST
[FreeTextEntry1] : Marcia MRI of the spine shows degeneration to the discs that is mild bony architecture does appear to be somewhat degenerated as well.  There is endplate changes in the L1 vertebral segment.  These are notable anteriorly.  There is no disc herniation stenosis spondylolisthesis or otherwise notable. no

## 2022-09-12 ENCOUNTER — APPOINTMENT (OUTPATIENT)
Dept: INFUSION THERAPY | Facility: CLINIC | Age: 77
End: 2022-09-12

## 2022-09-12 LAB
ABO + RH PNL BLD: NORMAL
BASOPHILS # BLD AUTO: 0.01 K/UL
BASOPHILS NFR BLD AUTO: 0.3 %
BLD GP AB SCN SERPL QL: NORMAL
EOSINOPHIL # BLD AUTO: 0.31 K/UL
EOSINOPHIL NFR BLD AUTO: 8.4 %
HCT VFR BLD CALC: 23.3 %
HGB BLD-MCNC: 7.1 G/DL
IMM GRANULOCYTES NFR BLD AUTO: 0.3 %
LYMPHOCYTES # BLD AUTO: 0.59 K/UL
LYMPHOCYTES NFR BLD AUTO: 16 %
MAN DIFF?: NORMAL
MCHC RBC-ENTMCNC: 25.7 PG
MCHC RBC-ENTMCNC: 30.5 G/DL
MCV RBC AUTO: 84.4 FL
MONOCYTES # BLD AUTO: 0.41 K/UL
MONOCYTES NFR BLD AUTO: 11.1 %
NEUTROPHILS # BLD AUTO: 2.36 K/UL
NEUTROPHILS NFR BLD AUTO: 63.9 %
PLATELET # BLD AUTO: 114 K/UL
RBC # BLD: 2.76 M/UL
RBC # FLD: 17.2 %
WBC # FLD AUTO: 3.69 K/UL

## 2022-09-12 RX ORDER — ERYTHROPOIETIN 10000 [IU]/ML
30000 INJECTION, SOLUTION INTRAVENOUS; SUBCUTANEOUS ONCE
Refills: 0 | Status: COMPLETED | OUTPATIENT
Start: 2022-09-12 | End: 2022-09-12

## 2022-09-12 RX ADMIN — ERYTHROPOIETIN 30000 UNIT(S): 10000 INJECTION, SOLUTION INTRAVENOUS; SUBCUTANEOUS at 13:59

## 2022-09-12 NOTE — PATIENT PROFILE ADULT - FUNCTIONAL SCREEN CURRENT LEVEL: BATHING, MLM
2 = assistive person Griseofulvin Pregnancy And Lactation Text: This medication is Pregnancy Category X and is known to cause serious birth defects. It is unknown if this medication is excreted in breast milk but breast feeding should be avoided.

## 2022-09-12 NOTE — ED PROVIDER NOTE - EKG #1 DATE/TIME
11-Jan-2022 17:52 Burow's Graft Text: The defect edges were debeveled with a #15 scalpel blade.  Given the location of the defect, shape of the defect, the proximity to free margins and the presence of a standing cone deformity a Burow's skin graft was deemed most appropriate. The standing cone was removed and this tissue was then trimmed to the shape of the primary defect. The adipose tissue was also removed until only dermis and epidermis were left.  The skin margins of the secondary defect were undermined to an appropriate distance in all directions utilizing iris scissors.  The secondary defect was closed with interrupted buried subcutaneous sutures.  The skin edges were then re-apposed with running  sutures.  The skin graft was then placed in the primary defect and oriented appropriately.

## 2022-09-13 ENCOUNTER — APPOINTMENT (OUTPATIENT)
Dept: INFUSION THERAPY | Facility: CLINIC | Age: 77
End: 2022-09-13

## 2022-09-13 DIAGNOSIS — D64.9 ANEMIA, UNSPECIFIED: ICD-10-CM

## 2022-09-13 RX ORDER — ACETAMINOPHEN 500 MG
650 TABLET ORAL ONCE
Refills: 0 | Status: COMPLETED | OUTPATIENT
Start: 2022-09-13 | End: 2022-09-13

## 2022-09-13 RX ORDER — HYDROCORTISONE 20 MG
100 TABLET ORAL ONCE
Refills: 0 | Status: COMPLETED | OUTPATIENT
Start: 2022-09-13 | End: 2022-09-13

## 2022-09-13 RX ADMIN — Medication 650 MILLIGRAM(S): at 14:55

## 2022-09-13 RX ADMIN — Medication 100 MILLIGRAM(S): at 14:55

## 2022-09-19 ENCOUNTER — APPOINTMENT (OUTPATIENT)
Dept: INFUSION THERAPY | Facility: CLINIC | Age: 77
End: 2022-09-19

## 2022-09-19 LAB
ABO + RH PNL BLD: NORMAL
BASOPHILS # BLD AUTO: 0.01 K/UL
BASOPHILS NFR BLD AUTO: 0.4 %
BLD GP AB SCN SERPL QL: NORMAL
EOSINOPHIL # BLD AUTO: 0.14 K/UL
EOSINOPHIL NFR BLD AUTO: 5.3 %
HCT VFR BLD CALC: 20.4 %
HGB BLD-MCNC: 6.3 G/DL
IMM GRANULOCYTES NFR BLD AUTO: 0.4 %
LYMPHOCYTES # BLD AUTO: 0.44 K/UL
LYMPHOCYTES NFR BLD AUTO: 16.7 %
MAN DIFF?: NORMAL
MCHC RBC-ENTMCNC: 26.9 PG
MCHC RBC-ENTMCNC: 30.9 G/DL
MCV RBC AUTO: 87.2 FL
MONOCYTES # BLD AUTO: 0.42 K/UL
MONOCYTES NFR BLD AUTO: 16 %
NEUTROPHILS # BLD AUTO: 1.61 K/UL
NEUTROPHILS NFR BLD AUTO: 61.2 %
PLATELET # BLD AUTO: 101 K/UL
RBC # BLD: 2.34 M/UL
RBC # FLD: 16.8 %
WBC # FLD AUTO: 2.63 K/UL

## 2022-09-19 RX ORDER — ERYTHROPOIETIN 10000 [IU]/ML
30000 INJECTION, SOLUTION INTRAVENOUS; SUBCUTANEOUS ONCE
Refills: 0 | Status: COMPLETED | OUTPATIENT
Start: 2022-09-19 | End: 2022-09-19

## 2022-09-19 RX ADMIN — ERYTHROPOIETIN 30000 UNIT(S): 10000 INJECTION, SOLUTION INTRAVENOUS; SUBCUTANEOUS at 14:21

## 2022-09-20 ENCOUNTER — APPOINTMENT (OUTPATIENT)
Dept: INFUSION THERAPY | Facility: CLINIC | Age: 77
End: 2022-09-20

## 2022-09-20 RX ORDER — ACETAMINOPHEN 500 MG
650 TABLET ORAL ONCE
Refills: 0 | Status: COMPLETED | OUTPATIENT
Start: 2022-09-20 | End: 2022-09-20

## 2022-09-20 RX ORDER — HYDROCORTISONE 20 MG
100 TABLET ORAL ONCE
Refills: 0 | Status: COMPLETED | OUTPATIENT
Start: 2022-09-20 | End: 2022-09-20

## 2022-09-20 RX ORDER — FUROSEMIDE 40 MG
20 TABLET ORAL ONCE
Refills: 0 | Status: COMPLETED | OUTPATIENT
Start: 2022-09-20 | End: 2022-09-20

## 2022-09-20 RX ADMIN — Medication 100 MILLIGRAM(S): at 13:26

## 2022-09-20 RX ADMIN — Medication 20 MILLIGRAM(S): at 16:01

## 2022-09-20 RX ADMIN — Medication 650 MILLIGRAM(S): at 13:26

## 2022-09-26 ENCOUNTER — APPOINTMENT (OUTPATIENT)
Dept: HEMATOLOGY ONCOLOGY | Facility: CLINIC | Age: 77
End: 2022-09-26

## 2022-09-26 ENCOUNTER — APPOINTMENT (OUTPATIENT)
Dept: INFUSION THERAPY | Facility: CLINIC | Age: 77
End: 2022-09-26

## 2022-09-26 VITALS
HEIGHT: 66 IN | BODY MASS INDEX: 38.57 KG/M2 | WEIGHT: 240 LBS | DIASTOLIC BLOOD PRESSURE: 63 MMHG | RESPIRATION RATE: 16 BRPM | SYSTOLIC BLOOD PRESSURE: 142 MMHG | HEART RATE: 70 BPM | OXYGEN SATURATION: 98 % | TEMPERATURE: 97.1 F

## 2022-09-26 LAB
BASOPHILS # BLD AUTO: 0.01 K/UL
BASOPHILS NFR BLD AUTO: 0.3 %
EOSINOPHIL # BLD AUTO: 0.22 K/UL
EOSINOPHIL NFR BLD AUTO: 6.7 %
HCT VFR BLD CALC: 24.8 %
HGB BLD-MCNC: 7.5 G/DL
IMM GRANULOCYTES NFR BLD AUTO: 0.3 %
LYMPHOCYTES # BLD AUTO: 0.64 K/UL
LYMPHOCYTES NFR BLD AUTO: 19.6 %
MAN DIFF?: NORMAL
MCHC RBC-ENTMCNC: 27 PG
MCHC RBC-ENTMCNC: 30.2 G/DL
MCV RBC AUTO: 89.2 FL
MONOCYTES # BLD AUTO: 0.34 K/UL
MONOCYTES NFR BLD AUTO: 10.4 %
NEUTROPHILS # BLD AUTO: 2.04 K/UL
NEUTROPHILS NFR BLD AUTO: 62.7 %
PLATELET # BLD AUTO: 117 K/UL
RBC # BLD: 2.78 M/UL
RBC # FLD: 16.5 %
WBC # FLD AUTO: 3.26 K/UL

## 2022-09-26 PROCEDURE — 99213 OFFICE O/P EST LOW 20 MIN: CPT

## 2022-09-26 RX ORDER — IRON SUCROSE 20 MG/ML
200 INJECTION, SOLUTION INTRAVENOUS ONCE
Refills: 0 | Status: COMPLETED | OUTPATIENT
Start: 2022-09-26 | End: 2022-09-26

## 2022-09-26 RX ORDER — ERYTHROPOIETIN 10000 [IU]/ML
30000 INJECTION, SOLUTION INTRAVENOUS; SUBCUTANEOUS ONCE
Refills: 0 | Status: COMPLETED | OUTPATIENT
Start: 2022-09-26 | End: 2023-02-09

## 2022-09-26 RX ADMIN — IRON SUCROSE 110 MILLIGRAM(S): 20 INJECTION, SOLUTION INTRAVENOUS at 12:03

## 2022-09-27 LAB — FERRITIN SERPL-MCNC: 87 NG/ML

## 2022-09-29 NOTE — PHYSICAL EXAM
[Obese] : obese [Normal] : clear to auscultation bilaterally, no dullness, no wheezing [de-identified] : pale in no acute distress.  [de-identified] : grade 3/6 systolic murmur  [de-identified] : lower extremity edema ( R > L ) 1 to 2 plus .  [de-identified] : mild diffuse tenderness , no rebound

## 2022-09-29 NOTE — HISTORY OF PRESENT ILLNESS
[de-identified] : bruce is a 74 year old white female with hypertension, chronic kidney disease, morbidly obese presents today with normocytic anemia. \par Her most recent CBC 04/01/2020 shows WBC -5.66, HB of 6.7, MCV -86.3, RDW - 17.3, platelet count- 196. Her Hb was normal until 08/2019. In October 2019 she noticed black tarry stools and was feeling tired. She went to ER and her Hb was 5.3. She was given 3 units of PRBC. Her iron studies at that showed ferritin of 8 and percent saturation of 5. She had EGD and colonoscopy which did not reveal any bleeding lesions. Following that event as per patient she was not given iron (?not sure why). She was readmitted to hospital in 01/2020 for SOB on exertion and her HB was noted to 6.0 again. She was given 2 units and her iron studies were consistent with DARRIN. B12 and folate were normal. Immunofixation showed weak IgG lambda migrating para protein. Free light chain ratio 2.1. Normal calcium. \par EGD and VCE was done. EGD revealed gastritis and VCE showed bleeding in small bowel. \par She was started on oral iron and she took for about three months. \par Her latest ferritin done in feb 2020 was 24 and percent saturation was 72% and her hb improved to 8.8. She also has push enteroscopy in 02/2020 and no bleeding lesions were found. Was recommended to have repeat colonoscopy and double balloon enteroscopy if she bleeds again.\par She went for blood work again on 04/01/2020 as she was feeling weak and having SOB and her hb dropped to 6.7. She was told by PMD to start on PROCRIT 10,000 units M-W-F. She was told her iron levels are good and she can stop iron. \par \par Today she feels very tired and her SOB is worse. She denies any melena or BRBPR in last few days. She does have anal fissure and hemorrhoids and bleeds when she is constipated. She denies hematuria or post menopausal bleeding. Denies weight loss. Does not take any NSAIDs or aspirin. She has not followed up with nephrology before.\par Family history is significant for breast cancer in her mother. She is a former smoker stopped 20 years ago.\par She is uptodate with mammogram and Pap smear. \par  [de-identified] : 09/17/2020 Patient returns for follow up , she feels better after venofer X 4 and on EPO 10,000 weekly , Hb is up to 10 . \par \par 10/15/2020 Patient returns for follow up 2 weeks after last dose of EPO , today's Hb is 10.7 . she offers no new complaints . \par \par 04/08/2021 Patient returns for follow up for anemia on EPO and iron replacement . Hb is 10.8 . she complains of mild left arm pain after she started to self check her BP . ahe meds were recently adjusted by her PMD . \par \par 08/31/2021 patient returns for follow up , she offers no new complaints , her Hgb is slowly trending down after last transfusion and venofer X1 for ferritin : 35 . \par \par 11/18/2021 patient returns for follow up complaining of weakness and dyspnea on minimal exertion , still with lower extremity edema R > L . Hgb is 7.1 2 weeks after transfusion and despite venofer and EPO . She denies melena or hematochezia . Of note she had suspected small bowel bleeding on enteroscopy or capsule endoscopy and was intolerant to push enteroscopy ( hypotension ? ) \par 2/14/22- Patient is here for follow up visit for management of DARRIN. Hgb is 6.7  3 days only after last transfusion , she reports several episodes of bleeding presumably from hemorrhoids and is using topical medications , Ferritin levels remain low despite venofer . She complains of weakness, bilateral leg edema . no chest pain or SOB . \par \par 12/2/21: patient returns for follow up for DARRIN secondary  obscure GI bleeding  and CKD. \par She is feeling better today. We will continue  Venofer infusion weekly X 3 and Procrit.\par Close monitor CBC on weekly basis with possible blood transfusion if required. \par \par 5/19/2022 Patient returns for follow up , she lost her  last week to lung cancer . He Hgb is still 7.2  two weeks after transfusion and despite weekly venofer . Ferritin is trending down . She is noted with weight loss and complains of abdominal cramps and pain radiating from epigastric area and RUQ , radiating to the back .\par Of note CT scan from 1/2022 showed prominent adenopathy involving mesenteric and retroperitoneal lymph nodes. \par \par \par 8/8/2022 Patient returns for chronic iron deficiency anemia , she is requiring transfusion every 2 weeks . today's Hgb is 6.9 however she denies hematochezia , increased weakness , dizziness , chest pain or palpitations . She continues with mild RUQ pain radiating to the back. \par \par 9/6/22: Patient returns for chronic iron deficiency anemia. She is complaining of lightheadedness and SOB since yesterday. CBC shows hemoglobin 5.7 g/dL, platelts 124 with normal ANC. She had a port placed since she has difficult venous access and requires frequent transfusions. Her PET scan was negative for suspicious lesions. She was referred to the ED for transfusion. Denies melena or hematochezia. \par \par 9/26/22: Patient returns for followup of her DARRIN, likely due to occult GI bleed (hx of positive capsule endoscpoy, negative enteroscopy in 2020). She received 2 units in the ED on 9/6, then 1 unit 9/13, and 2 units again on the 9/20. She is feeling well this week after her most recent transfusion. She has had good energy and denies SOB, fatigue, dizziness. She had some hemorrhoidal bleeding prior to her most recent transfusion. \par

## 2022-09-29 NOTE — ASSESSMENT
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 ) , \par peripheral edema . \par Constipation , rectal bleeding ( hemorrhoids ? 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 Prominent mesenteric lymph nodes . PET scan from 8/9/22 no suspicious lesions\par \par \par Plan : Venofer 200mg x1 today, will reassess after repeat ferritin today\par          Continue with 30,000 units of Retacrit weekly\par          CBC and venofer weekly\par          Consider repeat GI evaluatoin

## 2022-10-03 ENCOUNTER — APPOINTMENT (OUTPATIENT)
Dept: HEMATOLOGY ONCOLOGY | Facility: CLINIC | Age: 77
End: 2022-10-03

## 2022-10-03 ENCOUNTER — EMERGENCY (EMERGENCY)
Facility: HOSPITAL | Age: 77
LOS: 0 days | Discharge: HOME | End: 2022-10-04
Attending: EMERGENCY MEDICINE | Admitting: EMERGENCY MEDICINE

## 2022-10-03 VITALS
RESPIRATION RATE: 19 BRPM | HEART RATE: 75 BPM | SYSTOLIC BLOOD PRESSURE: 114 MMHG | DIASTOLIC BLOOD PRESSURE: 54 MMHG | OXYGEN SATURATION: 98 % | TEMPERATURE: 98 F

## 2022-10-03 VITALS
WEIGHT: 210.1 LBS | DIASTOLIC BLOOD PRESSURE: 54 MMHG | SYSTOLIC BLOOD PRESSURE: 127 MMHG | HEART RATE: 63 BPM | OXYGEN SATURATION: 100 % | HEIGHT: 67 IN | RESPIRATION RATE: 18 BRPM | TEMPERATURE: 98 F

## 2022-10-03 DIAGNOSIS — R79.89 OTHER SPECIFIED ABNORMAL FINDINGS OF BLOOD CHEMISTRY: ICD-10-CM

## 2022-10-03 DIAGNOSIS — D64.9 ANEMIA, UNSPECIFIED: ICD-10-CM

## 2022-10-03 DIAGNOSIS — Z91.040 LATEX ALLERGY STATUS: ICD-10-CM

## 2022-10-03 DIAGNOSIS — Z20.822 CONTACT WITH AND (SUSPECTED) EXPOSURE TO COVID-19: ICD-10-CM

## 2022-10-03 DIAGNOSIS — Z86.16 PERSONAL HISTORY OF COVID-19: ICD-10-CM

## 2022-10-03 DIAGNOSIS — Z87.19 PERSONAL HISTORY OF OTHER DISEASES OF THE DIGESTIVE SYSTEM: Chronic | ICD-10-CM

## 2022-10-03 DIAGNOSIS — Z88.6 ALLERGY STATUS TO ANALGESIC AGENT: ICD-10-CM

## 2022-10-03 DIAGNOSIS — Z88.0 ALLERGY STATUS TO PENICILLIN: ICD-10-CM

## 2022-10-03 DIAGNOSIS — N18.9 CHRONIC KIDNEY DISEASE, UNSPECIFIED: ICD-10-CM

## 2022-10-03 DIAGNOSIS — I12.9 HYPERTENSIVE CHRONIC KIDNEY DISEASE WITH STAGE 1 THROUGH STAGE 4 CHRONIC KIDNEY DISEASE, OR UNSPECIFIED CHRONIC KIDNEY DISEASE: ICD-10-CM

## 2022-10-03 LAB
ALBUMIN SERPL ELPH-MCNC: 3.6 G/DL — SIGNIFICANT CHANGE UP (ref 3.5–5.2)
ALP SERPL-CCNC: 75 U/L — SIGNIFICANT CHANGE UP (ref 30–115)
ALT FLD-CCNC: <5 U/L — SIGNIFICANT CHANGE UP (ref 0–41)
ANION GAP SERPL CALC-SCNC: 7 MMOL/L — SIGNIFICANT CHANGE UP (ref 7–14)
APTT BLD: 58.2 SEC — HIGH (ref 27–39.2)
AST SERPL-CCNC: 12 U/L — SIGNIFICANT CHANGE UP (ref 0–41)
BASOPHILS # BLD AUTO: 0.01 K/UL — SIGNIFICANT CHANGE UP (ref 0–0.2)
BASOPHILS NFR BLD AUTO: 0.4 % — SIGNIFICANT CHANGE UP (ref 0–1)
BILIRUB SERPL-MCNC: 0.7 MG/DL — SIGNIFICANT CHANGE UP (ref 0.2–1.2)
BLD GP AB SCN SERPL QL: SIGNIFICANT CHANGE UP
BUN SERPL-MCNC: 45 MG/DL — HIGH (ref 10–20)
CALCIUM SERPL-MCNC: 8.4 MG/DL — SIGNIFICANT CHANGE UP (ref 8.4–10.5)
CHLORIDE SERPL-SCNC: 113 MMOL/L — HIGH (ref 98–110)
CO2 SERPL-SCNC: 20 MMOL/L — SIGNIFICANT CHANGE UP (ref 17–32)
CREAT SERPL-MCNC: 1.9 MG/DL — HIGH (ref 0.7–1.5)
EGFR: 27 ML/MIN/1.73M2 — LOW
EOSINOPHIL # BLD AUTO: 0.21 K/UL — SIGNIFICANT CHANGE UP (ref 0–0.7)
EOSINOPHIL NFR BLD AUTO: 8.1 % — HIGH (ref 0–8)
GLUCOSE SERPL-MCNC: 83 MG/DL — SIGNIFICANT CHANGE UP (ref 70–99)
HCT VFR BLD CALC: 18.6 % — LOW (ref 37–47)
HGB BLD-MCNC: 5.6 G/DL — CRITICAL LOW (ref 12–16)
IMM GRANULOCYTES NFR BLD AUTO: 0.4 % — HIGH (ref 0.1–0.3)
INR BLD: 1 RATIO — SIGNIFICANT CHANGE UP (ref 0.65–1.3)
LYMPHOCYTES # BLD AUTO: 0.57 K/UL — LOW (ref 1.2–3.4)
LYMPHOCYTES # BLD AUTO: 22.1 % — SIGNIFICANT CHANGE UP (ref 20.5–51.1)
MCHC RBC-ENTMCNC: 27.2 PG — SIGNIFICANT CHANGE UP (ref 27–31)
MCHC RBC-ENTMCNC: 30.1 G/DL — LOW (ref 32–37)
MCV RBC AUTO: 90.3 FL — SIGNIFICANT CHANGE UP (ref 81–99)
MONOCYTES # BLD AUTO: 0.31 K/UL — SIGNIFICANT CHANGE UP (ref 0.1–0.6)
MONOCYTES NFR BLD AUTO: 12 % — HIGH (ref 1.7–9.3)
NEUTROPHILS # BLD AUTO: 1.47 K/UL — SIGNIFICANT CHANGE UP (ref 1.4–6.5)
NEUTROPHILS NFR BLD AUTO: 57 % — SIGNIFICANT CHANGE UP (ref 42.2–75.2)
NRBC # BLD: 0 /100 WBCS — SIGNIFICANT CHANGE UP (ref 0–0)
PLATELET # BLD AUTO: 130 K/UL — SIGNIFICANT CHANGE UP (ref 130–400)
POTASSIUM SERPL-MCNC: 5.4 MMOL/L — HIGH (ref 3.5–5)
POTASSIUM SERPL-SCNC: 5.4 MMOL/L — HIGH (ref 3.5–5)
PROT SERPL-MCNC: 5.6 G/DL — LOW (ref 6–8)
PROTHROM AB SERPL-ACNC: 11.4 SEC — SIGNIFICANT CHANGE UP (ref 9.95–12.87)
RBC # BLD: 2.06 M/UL — LOW (ref 4.2–5.4)
RBC # FLD: 17.7 % — HIGH (ref 11.5–14.5)
SODIUM SERPL-SCNC: 140 MMOL/L — SIGNIFICANT CHANGE UP (ref 135–146)
TROPONIN T SERPL-MCNC: <0.01 NG/ML — SIGNIFICANT CHANGE UP
WBC # BLD: 2.58 K/UL — LOW (ref 4.8–10.8)
WBC # FLD AUTO: 2.58 K/UL — LOW (ref 4.8–10.8)

## 2022-10-03 PROCEDURE — 93010 ELECTROCARDIOGRAM REPORT: CPT

## 2022-10-03 PROCEDURE — 99220: CPT

## 2022-10-03 RX ORDER — PANTOPRAZOLE SODIUM 20 MG/1
40 TABLET, DELAYED RELEASE ORAL AT BEDTIME
Refills: 0 | Status: DISCONTINUED | OUTPATIENT
Start: 2022-10-03 | End: 2022-10-04

## 2022-10-03 RX ORDER — DIPHENHYDRAMINE HCL 50 MG
25 CAPSULE ORAL AT BEDTIME
Refills: 0 | Status: DISCONTINUED | OUTPATIENT
Start: 2022-10-03 | End: 2022-10-04

## 2022-10-03 NOTE — ED PROVIDER NOTE - NS ED ROS FT
Constitutional: No fevers, chills, or malaise.  HEENT: No headache, visual changes   Cardiac:  No chest pain, SOB, leg edema, or leg pain.  Respiratory:  No cough, respiratory distress, or hemoptysis.  GI:  No nausea, vomiting, diarrhea, or abdominal pain.  :  No dysuria, frequency, or urgency.  MS:  No myalgia, muscle weakness, joint pain or back pain.  Neuro:  No LOC, paralysis, or N/T.  Skin:  No skin rash.   Endocrine: No polyuria, polyphagia, or polydipsia.

## 2022-10-03 NOTE — ED CDU PROVIDER INITIAL DAY NOTE - OBJECTIVE STATEMENT
77 yo female w/ PMH of anemia w/ chronic transfusions, HTN, CKD presents for low Hb. Outpt labs showing Hb of 5.6.  Associated light-headedness, which occurs whenever Hb low.  No fevers, blood in stool, hematuria, chest pain, SOB. Pt follows at [facility] weekly, typically reports baseline Hgb high 7s to low 8s. Pt also states underwent capsule endoscopy and was told she had a "slow bleed" but was "too high risk" for surgery. She denies currently seeing any blood in stool.

## 2022-10-03 NOTE — ED PROVIDER NOTE - OBJECTIVE STATEMENT
77 yo female w/ PMH of anemia w/ chronic transfusions, HTN, CKD presents for low Hb. Outpt labs showing Hb of 5.6.  Associated light-headedness, which occurs whenever Hb low.  No fevers, blood in stool, hematuria, chest pain, SOB.

## 2022-10-03 NOTE — ED ADULT NURSE NOTE - NSIMPLEMENTINTERV_GEN_ALL_ED
Implemented All Universal Safety Interventions:  [place] to call system. Call bell, personal items and telephone within reach. Instruct patient to call for assistance. Room bathroom lighting operational. Non-slip footwear when patient is off stretcher. Physically safe environment: no spills, clutter or unnecessary equipment. Stretcher in lowest position, wheels locked, appropriate side rails in place. Implemented All Fall Risk Interventions:  [place] to call system. Call bell, personal items and telephone within reach. Instruct patient to call for assistance. Room bathroom lighting operational. Non-slip footwear when patient is off stretcher. Physically safe environment: no spills, clutter or unnecessary equipment. Stretcher in lowest position, wheels locked, appropriate side rails in place. Provide visual cue, wrist band, yellow gown, etc. Monitor gait and stability. Monitor for mental status changes and reorient to person, place, and time. Review medications for side effects contributing to fall risk. Reinforce activity limits and safety measures with patient and family.

## 2022-10-03 NOTE — ED PROVIDER NOTE - PHYSICAL EXAMINATION
GENERAL: NAD   SKIN: warm, dry  HEAD: Normocephalic; atraumatic.  EYES: PERRLA, EOMI  CARD: S1, S2 normal; no murmurs, gallops, or rubs. Regular rate and rhythm.   RESP: LCTAB; No wheezes, rales, rhonchi, or stridor.  ABD: soft, nontender, and nondistended  Rectal exam: pt denying rectal exam, discussed risks and benefits   NEURO: Alert, oriented, grossly unremarkable  PSYCH: Cooperative, appropriate.

## 2022-10-03 NOTE — ED PROVIDER NOTE - ATTENDING CONTRIBUTION TO CARE
76-year-old female past medical history of chronic anemia, transfusion in the past, hypertension and CKD, sent from the [facility] for hemoglobin of 5.7.  Patient reports feeling tired and lightheaded.  No LOC.  No chest pain shortness of breath palpitation.  No bright red blood per rectum or melena.  Follows with Dr. Gaston.  Has right chest port in place for the last month which has been used before.  No fever cough.    On exam, AFVSS, Well appearing, No acute distress, NCAT, EOMI, PERRLA, MMM, Neck supple, LCTAB, RRR nl s1s2 No mrg, Abdomen Soft NTND, AAOx3, No Focal Deficits, No LE edema or calf TTP, pallor noted    A/P; anemia, will do labs type and screen consent blood transfusion observation unit

## 2022-10-03 NOTE — ED CDU PROVIDER INITIAL DAY NOTE - ATTENDING APP SHARED VISIT CONTRIBUTION OF CARE
76-year-old female past medical history of chronic anemia, transfusion in the past, hypertension and CKD, sent from the [facility] for hemoglobin of 5.7.  Patient reports feeling tired and lightheaded.  No LOC.  No chest pain shortness of breath palpitation.  No bright red blood per rectum or melena.  Follows with Dr. Gaston.  Has right chest port in place for the last month which has been used before.  No fever cough.    On exam, AFVSS, Well appearing, No acute distress, NCAT, EOMI, PERRLA, MMM, Neck supple, LCTAB, RRR nl s1s2 No mrg, Abdomen Soft NTND, AAOx3, No Focal Deficits, No LE edema or calf TTP, pallor noted    A/P; anemia, will do labs type and screen consent blood transfusion observation unit

## 2022-10-03 NOTE — ED CDU PROVIDER INITIAL DAY NOTE - NS ED ROS FT
CONST: (+) fatigued  EYES: No pain, redness, drainage or visual changes.  ENT: No ear pain or discharge, nasal discharge or congestion. No sore throat  CARD: No chest pain, palpitations  RESP: No SOB, cough  GI: No abdominal pain, N/V/D  MS: No joint pain, back pain or extremity pain/injury  SKIN: No rashes  NEURO: No headache, dizziness, paresthesias or LOC

## 2022-10-03 NOTE — ED CDU PROVIDER INITIAL DAY NOTE - PHYSICAL EXAMINATION
CONST: Well appearing in NAD  EYES: PERRL, EOMI, Sclera and conjunctiva clear. (+) pale conjunctivae  ENT: No nasal discharge. Oropharynx normal appearing, no erythema or exudates. Uvula midline.  CARD: Normal S1 S2; Normal rate and rhythm  RESP: Equal BS B/L, No wheezes, rhonchi or rales. No distress  GI: Soft, non-tender, non-distended.  MS: Normal ROM in all extremities. No midline spinal tenderness.  SKIN: Warm, dry, no acute rashes. Good turgor  NEURO: A&Ox3, No focal deficits. Strength 5/5 with no sensory deficits.

## 2022-10-03 NOTE — ED ADULT NURSE NOTE - CHIEF COMPLAINT QUOTE
Pt sent from [facility] for hemoglobin 5.7. Pt states she feels tired and dizzy

## 2022-10-03 NOTE — ED ADULT NURSE NOTE - OBJECTIVE STATEMENT
Pt a&ox3, pt in ED for low hemoglobin, PMH of anemia and HTN, c/o lightheadedness, no SOB, unlabored breathing, equal rise & fall, able to speak in full sentences, no N/V/D.

## 2022-10-04 LAB
BASOPHILS # BLD AUTO: 0.01 K/UL
BASOPHILS NFR BLD AUTO: 0.3 %
EOSINOPHIL # BLD AUTO: 0.2 K/UL
EOSINOPHIL NFR BLD AUTO: 6.8 %
FERRITIN SERPL-MCNC: 48 NG/ML
HCT VFR BLD CALC: 19.7 %
HGB BLD-MCNC: 5.7 G/DL
IMM GRANULOCYTES NFR BLD AUTO: 0.3 %
LYMPHOCYTES # BLD AUTO: 0.6 K/UL
LYMPHOCYTES NFR BLD AUTO: 20.3 %
MAN DIFF?: NORMAL
MCHC RBC-ENTMCNC: 26.5 PG
MCHC RBC-ENTMCNC: 28.9 G/DL
MCV RBC AUTO: 91.6 FL
MONOCYTES # BLD AUTO: 0.31 K/UL
MONOCYTES NFR BLD AUTO: 10.5 %
NEUTROPHILS # BLD AUTO: 1.82 K/UL
NEUTROPHILS NFR BLD AUTO: 61.8 %
PLATELET # BLD AUTO: 127 K/UL
RBC # BLD: 2.15 M/UL
RBC # FLD: 18 %
WBC # FLD AUTO: 2.95 K/UL

## 2022-10-04 PROCEDURE — 99217: CPT

## 2022-10-04 RX ADMIN — Medication 20 MILLIGRAM(S): at 00:09

## 2022-10-04 RX ADMIN — PANTOPRAZOLE SODIUM 40 MILLIGRAM(S): 20 TABLET, DELAYED RELEASE ORAL at 00:09

## 2022-10-04 RX ADMIN — Medication 25 MILLIGRAM(S): at 00:09

## 2022-10-04 NOTE — ED CDU PROVIDER DISPOSITION NOTE - PROVIDER TOKENS
PROVIDER:[TOKEN:[[id number]],FOLLOWUP:[1-3 Days]],FREE:[LAST:[your pmd in 1-3 days],PHONE:[(   )    -],FAX:[(   )    -]]

## 2022-10-04 NOTE — REASON FOR VISIT
Pt informed that Dr Fletcher does not have any avail appts until 12/2022 because of his insurance. Pt would like to discuss a procedure.   [Follow-Up Visit] : a follow-up visit for [FreeTextEntry2] : anemia

## 2022-10-04 NOTE — ED CDU PROVIDER DISPOSITION NOTE - CARE PROVIDER_API CALL
Liu Gaston)  Internal Medicine; Medical Oncology  [address] [place] [address]  Phone: [phone]  Fax: [phone]  Follow Up Time: 1-3 Days    your pmd in 1-3 days,   Phone: (   )    -  Fax: (   )    -  Follow Up Time:

## 2022-10-04 NOTE — ED CDU PROVIDER DISPOSITION NOTE - PATIENT PORTAL LINK FT
You can access the FollowMyHealth Patient Portal offered by [facility] by registering at the following website: [email or web address]. By joining [facility]’s FollowMyHealth portal, you will also be able to view your health information using other applications (apps) compatible with our system.

## 2022-10-04 NOTE — ED CDU PROVIDER DISPOSITION NOTE - NS ED ATTENDING STATEMENT MOD
Paged dr crowell: do you want IVF to continue at 50/hr?    10:29 AM  Order received to stop IVF and start IV lasix.   This was a shared visit with the TINO. I reviewed and verified the documentation and independently performed the documented:

## 2022-10-07 ENCOUNTER — RX RENEWAL (OUTPATIENT)
Age: 77
End: 2022-10-07

## 2022-10-11 ENCOUNTER — APPOINTMENT (OUTPATIENT)
Dept: INFUSION THERAPY | Facility: CLINIC | Age: 77
End: 2022-10-11

## 2022-10-11 ENCOUNTER — INPATIENT (INPATIENT)
Facility: HOSPITAL | Age: 77
LOS: 4 days | Discharge: HOME HEALTH | End: 2022-10-16
Attending: INTERNAL MEDICINE | Admitting: INTERNAL MEDICINE
Payer: MEDICARE

## 2022-10-11 ENCOUNTER — NON-APPOINTMENT (OUTPATIENT)
Age: 77
End: 2022-10-11

## 2022-10-11 VITALS
WEIGHT: 235.89 LBS | SYSTOLIC BLOOD PRESSURE: 107 MMHG | TEMPERATURE: 98 F | HEART RATE: 74 BPM | OXYGEN SATURATION: 98 % | DIASTOLIC BLOOD PRESSURE: 58 MMHG | RESPIRATION RATE: 16 BRPM | HEIGHT: 67 IN

## 2022-10-11 DIAGNOSIS — Z87.19 PERSONAL HISTORY OF OTHER DISEASES OF THE DIGESTIVE SYSTEM: Chronic | ICD-10-CM

## 2022-10-11 LAB
ALBUMIN SERPL ELPH-MCNC: 3.4 G/DL — LOW (ref 3.5–5.2)
ALP SERPL-CCNC: 80 U/L — SIGNIFICANT CHANGE UP (ref 30–115)
ALT FLD-CCNC: <5 U/L — SIGNIFICANT CHANGE UP (ref 0–41)
ANION GAP SERPL CALC-SCNC: 9 MMOL/L — SIGNIFICANT CHANGE UP (ref 7–14)
ANISOCYTOSIS BLD QL: SIGNIFICANT CHANGE UP
APTT BLD: 32.5 SEC — SIGNIFICANT CHANGE UP (ref 27–39.2)
AST SERPL-CCNC: 12 U/L — SIGNIFICANT CHANGE UP (ref 0–41)
BASOPHILS # BLD AUTO: 0 K/UL — SIGNIFICANT CHANGE UP (ref 0–0.2)
BASOPHILS # BLD AUTO: 0.01 K/UL
BASOPHILS NFR BLD AUTO: 0 % — SIGNIFICANT CHANGE UP (ref 0–1)
BASOPHILS NFR BLD AUTO: 0.3 %
BILIRUB SERPL-MCNC: 0.4 MG/DL — SIGNIFICANT CHANGE UP (ref 0.2–1.2)
BLD GP AB SCN SERPL QL: SIGNIFICANT CHANGE UP
BUN SERPL-MCNC: 53 MG/DL — HIGH (ref 10–20)
CALCIUM SERPL-MCNC: 8 MG/DL — LOW (ref 8.4–10.4)
CHLORIDE SERPL-SCNC: 115 MMOL/L — HIGH (ref 98–110)
CO2 SERPL-SCNC: 21 MMOL/L — SIGNIFICANT CHANGE UP (ref 17–32)
CREAT SERPL-MCNC: 2.3 MG/DL — HIGH (ref 0.7–1.5)
EGFR: 21 ML/MIN/1.73M2 — LOW
EOSINOPHIL # BLD AUTO: 0.16 K/UL — SIGNIFICANT CHANGE UP (ref 0–0.7)
EOSINOPHIL # BLD AUTO: 0.17 K/UL
EOSINOPHIL NFR BLD AUTO: 5.1 %
EOSINOPHIL NFR BLD AUTO: 5.2 % — SIGNIFICANT CHANGE UP (ref 0–8)
GLUCOSE SERPL-MCNC: 101 MG/DL — HIGH (ref 70–99)
HCT VFR BLD CALC: 18.2 % — LOW (ref 37–47)
HCT VFR BLD CALC: 19.6 %
HGB BLD-MCNC: 5.5 G/DL — CRITICAL LOW (ref 12–16)
HGB BLD-MCNC: 5.9 G/DL
IMM GRANULOCYTES NFR BLD AUTO: 0.6 %
INR BLD: 1 RATIO — SIGNIFICANT CHANGE UP (ref 0.65–1.3)
LYMPHOCYTES # BLD AUTO: 0.41 K/UL — LOW (ref 1.2–3.4)
LYMPHOCYTES # BLD AUTO: 0.64 K/UL
LYMPHOCYTES # BLD AUTO: 13.1 % — LOW (ref 20.5–51.1)
LYMPHOCYTES NFR BLD AUTO: 19.3 %
MAN DIFF?: NORMAL
MANUAL SMEAR VERIFICATION: SIGNIFICANT CHANGE UP
MCHC RBC-ENTMCNC: 26.5 PG — LOW (ref 27–31)
MCHC RBC-ENTMCNC: 27.3 PG
MCHC RBC-ENTMCNC: 29.3 G/DL — LOW (ref 32–37)
MCHC RBC-ENTMCNC: 30.1 G/DL
MCV RBC AUTO: 90.2 FL — SIGNIFICANT CHANGE UP (ref 81–99)
MCV RBC AUTO: 90.7 FL
MICROCYTES BLD QL: SLIGHT — SIGNIFICANT CHANGE UP
MONOCYTES # BLD AUTO: 0.27 K/UL — SIGNIFICANT CHANGE UP (ref 0.1–0.6)
MONOCYTES # BLD AUTO: 0.34 K/UL
MONOCYTES NFR BLD AUTO: 10.2 %
MONOCYTES NFR BLD AUTO: 8.7 % — SIGNIFICANT CHANGE UP (ref 1.7–9.3)
NEUTROPHILS # BLD AUTO: 2.14 K/UL
NEUTROPHILS # BLD AUTO: 2.26 K/UL — SIGNIFICANT CHANGE UP (ref 1.4–6.5)
NEUTROPHILS NFR BLD AUTO: 64.5 %
NEUTROPHILS NFR BLD AUTO: 73 % — SIGNIFICANT CHANGE UP (ref 42.2–75.2)
OVALOCYTES BLD QL SMEAR: SIGNIFICANT CHANGE UP
PLAT MORPH BLD: NORMAL — SIGNIFICANT CHANGE UP
PLATELET # BLD AUTO: 104 K/UL — LOW (ref 130–400)
PLATELET # BLD AUTO: 117 K/UL
POIKILOCYTOSIS BLD QL AUTO: SIGNIFICANT CHANGE UP
POLYCHROMASIA BLD QL SMEAR: SLIGHT — SIGNIFICANT CHANGE UP
POTASSIUM SERPL-MCNC: 5.1 MMOL/L — HIGH (ref 3.5–5)
POTASSIUM SERPL-SCNC: 5.1 MMOL/L — HIGH (ref 3.5–5)
PROT SERPL-MCNC: 5.7 G/DL — LOW (ref 6–8)
PROTHROM AB SERPL-ACNC: 11.4 SEC — SIGNIFICANT CHANGE UP (ref 9.95–12.87)
RBC # BLD: 2.04 M/UL — LOW (ref 4.2–5.4)
RBC # BLD: 2.16 M/UL
RBC # FLD: 16.2 % — HIGH (ref 11.5–14.5)
RBC # FLD: 16.3 %
RBC BLD AUTO: NORMAL — SIGNIFICANT CHANGE UP
SARS-COV-2 RNA SPEC QL NAA+PROBE: SIGNIFICANT CHANGE UP
SODIUM SERPL-SCNC: 145 MMOL/L — SIGNIFICANT CHANGE UP (ref 135–146)
WBC # BLD: 3.1 K/UL — LOW (ref 4.8–10.8)
WBC # FLD AUTO: 3.1 K/UL — LOW (ref 4.8–10.8)
WBC # FLD AUTO: 3.32 K/UL

## 2022-10-11 PROCEDURE — 99285 EMERGENCY DEPT VISIT HI MDM: CPT

## 2022-10-11 RX ORDER — DIPHENHYDRAMINE HCL 50 MG
50 CAPSULE ORAL ONCE
Refills: 0 | Status: COMPLETED | OUTPATIENT
Start: 2022-10-11 | End: 2022-10-11

## 2022-10-11 RX ORDER — IRON SUCROSE 20 MG/ML
200 INJECTION, SOLUTION INTRAVENOUS ONCE
Refills: 0 | Status: COMPLETED | OUTPATIENT
Start: 2022-10-11 | End: 2022-10-11

## 2022-10-11 RX ORDER — ERYTHROPOIETIN 10000 [IU]/ML
30000 INJECTION, SOLUTION INTRAVENOUS; SUBCUTANEOUS ONCE
Refills: 0 | Status: COMPLETED | OUTPATIENT
Start: 2022-10-11 | End: 2022-10-11

## 2022-10-11 RX ADMIN — IRON SUCROSE 110 MILLIGRAM(S): 20 INJECTION, SOLUTION INTRAVENOUS at 13:13

## 2022-10-11 RX ADMIN — Medication 50 MILLIGRAM(S): at 23:35

## 2022-10-11 RX ADMIN — ERYTHROPOIETIN 30000 UNIT(S): 10000 INJECTION, SOLUTION INTRAVENOUS; SUBCUTANEOUS at 13:13

## 2022-10-11 RX ADMIN — Medication 125 MILLIGRAM(S): at 23:35

## 2022-10-11 NOTE — ED PROVIDER NOTE - PROGRESS NOTE DETAILS
Discussed with patient's hematologist, per Dr. Gaston, patient has a history of bleeding AVMs and requires repeat GI evaluation given that patient is now requiring almost monthly transfusions.  Per patient, patient last had pill endoscopy over a year ago that showed AVMs.  Patient declining rectal exam today as she had one last week and states that it was normal.  Discussed with GI fellow, recommends transfuse to hemoglobin greater than 6, will evaluate in the morning.

## 2022-10-11 NOTE — ED PROVIDER NOTE - NS ED ROS FT
Constitutional:  See HPI.   Eyes:  No visual changes, eye pain or discharge.  ENMT:  No hearing changes, pain, discharge or infections. No neck pain or stiffness.     Respiratory:  No cough or respiratory distress. No hemoptysis.  GI:  No nausea, vomiting, diarrhea, abdominal pain.  :  No dysuria, frequency, hematuria  MS:  No joint pain or back pain.  Neuro:  No LOC.    Skin:  No skin rash.  Except as in HPI, all other review of systems is negative

## 2022-10-11 NOTE — H&P ADULT - NSHPSOCIALHISTORY_GEN_ALL_CORE
non smoker  non alcoholic  non covid vaccinated  ambulates mostly independently but uses walker occasionally

## 2022-10-11 NOTE — H&P ADULT - ATTENDING COMMENTS
76 year old female patient with PMHx of Severe AS, Anemia Likely Secondary to obscure GI bleeding (from Duodenum and Gastric AVM per recent capsule endoscopy 02/2022 after presenting with melena), DARRIN ( follows with Dr Silver. Receives Transfusions q1-2 weeks and Retacrit 20994 units weekly), Hypertension, CKD4., Sciatica presents for anemia.    #Acute blood loss anemia  #Suspected Duodenal and Gastric AVM Bleeding  #Moderate Diverticulosis   #Grade II Internal Hemorrhoids  #Erosive Gastritis  Recent Admission in 02/2022 for melena: s/p capsule endoscopy showing bleeding from Duodenum and Gastric AVM   s/p 2 unit PRBC in ED  Receives Transfusions q1-2 weeks and Retacrit 33426 units weekly  Suspect AVM is related to her severe AS  - Trend CBC closely and transfuse as needed  - Active Type and Screen  - Check coagulation profile  - 2 Large IV Bores  - Cont IV protonix  - GI considering EGD during this admission  - Cardio eval pending for risk stratification    #Hypertension  - Home lopressor 25mg BID  - Monitor BP and avoid hypotension in setting of severe AS  - Resume home anti HTN     #H/O severe AS  - TTE 02/22 -> EF 74%, severe AS  - followed by Dr Paula  - Cardio to f/u  - TAVR likely to be considered outpatient    #CKD 4  - creat 2.3 Baseline Cr 1.7-2.3 2022  - Trend CMP and P daily  - Outpatient nephrology follow up  - c/w home torsemide dose    DVT PPX, SCD    #Progress Note Handoff  Pending (specify): Possible EDG, Cardio f/u  Family discussion: d/w pt regarding eval for anemia  Disposition: Home

## 2022-10-11 NOTE — H&P ADULT - NSHPLABSRESULTS_GEN_ALL_CORE
LABS:                          5.5    3.10  )-----------( 104      ( 11 Oct 2022 20:32 )             18.2     10-11    145  |  115<H>  |  53<H>  ----------------------------<  101<H>  5.1<H>   |  21  |  2.3<H>    Ca    8.0<L>      11 Oct 2022 20:32    TPro  5.7<L>  /  Alb  3.4<L>  /  TBili  0.4  /  DBili  x   /  AST  12  /  ALT  <5  /  AlkPhos  80  10-11    LIVER FUNCTIONS - ( 11 Oct 2022 20:32 )  Alb: 3.4 g/dL / Pro: 5.7 g/dL / ALK PHOS: 80 U/L / ALT: <5 U/L / AST: 12 U/L / GGT: x           PT/INR - ( 11 Oct 2022 20:32 )   PT: 11.40 sec;   INR: 1.00 ratio         PTT - ( 11 Oct 2022 20:32 )  PTT:32.5 sec

## 2022-10-11 NOTE — ED PROVIDER NOTE - CLINICAL SUMMARY MEDICAL DECISION MAKING FREE TEXT BOX
76-year-old female history of hypertension CKD anemia presenting for evaluation of anemia.  Follows with Dr. Gaston, had Venofer infusion in office today, was found to have hemoglobin of 5.9.  Sent in for further evaluation.  States that she has generalized weakness, mild shortness of breath on exertion and this is typical when she becomes anemic.  Has more recently requiring transfusions every week.  Denies bright red blood per rectum.  Symptoms gradual onset, moderate. Discussed with patient's hematologist, per Dr. Gaston, patient has a history of bleeding AVMs and requires repeat GI evaluation given that patient is now requiring almost monthly transfusions.  Per patient, patient last had pill endoscopy over a year ago that showed AVMs.  Patient declining rectal exam today as she had one last week and states that it was normal.  Discussed with GI fellow, recommends transfuse to hemoglobin greater than 6, will evaluate in the morning.

## 2022-10-11 NOTE — H&P ADULT - HISTORY OF PRESENT ILLNESS
76 year old female patient with PMHx of Severe AS, Anemia Likely Secondary to obscure GI bleeding (from Duodenum and Gastric AVM per recent capsule endoscopy 02/2022 after presenting with melena), DARRIN ( follows with Dr Silver. Receives Transfusions q1-2 weeks and Retacrit 72741 units weekly), Hypertension, CKD4., Sciatica presents for the above chief complaint.      VITALS:   T(C): 36.6 (10-11-22 @ 15:46), Max: 36.6 (10-11-22 @ 15:46)  HR: 74 (10-11-22 @ 15:46) (74 - 74)  BP: 107/58 (10-11-22 @ 15:46) (107/58 - 107/58)  RR: 16 (10-11-22 @ 15:46) (16 - 16)  SpO2: 98% (10-11-22 @ 15:46) (98% - 98%)    in ED, vitals were WNL. labs were significant for a Hb od 5.5 and creat 2.3. patient admitted to medicine for further investigations 76 year old female patient with PMHx of Severe AS, Anemia Likely Secondary to obscure GI bleeding (from Duodenum and Gastric AVM per recent capsule endoscopy 02/2022 after presenting with melena), DARRIN ( follows with Dr Silver. Receives Transfusions q1-2 weeks and Retacrit 23456 units weekly), Hypertension, CKD4., Sciatica presents for the above chief complaint. patient saw her hematologist few days ago, she was complaining of fatigue and mild shortness of breath on exertion. she denied chest pain, fever, chills, abdominal pain, N/V, hematuria, hematochezia or melena. she was sent to the ED by her hematologist after OP labs showed severe anemia      VITALS:   T(C): 36.6 (10-11-22 @ 15:46), Max: 36.6 (10-11-22 @ 15:46)  HR: 74 (10-11-22 @ 15:46) (74 - 74)  BP: 107/58 (10-11-22 @ 15:46) (107/58 - 107/58)  RR: 16 (10-11-22 @ 15:46) (16 - 16)  SpO2: 98% (10-11-22 @ 15:46) (98% - 98%)    in ED, vitals were WNL. labs were significant for a Hb od 5.5 and creat 2.3. patient received 1 unit PRBC and admitted to medicine for further investigations

## 2022-10-11 NOTE — H&P ADULT - ASSESSMENT
76 year old female patient with PMHx of Severe AS, Anemia Likely Secondary to obscure GI bleeding (from Duodenum and Gastric AVM per recent capsule endoscopy 02/2022 after presenting with melena), DARRIN ( follows with Dr Silver. Receives Transfusions q1-2 weeks and Retacrit 01073 units weekly), Hypertension, CKD4., Sciatica presents for    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 and Gastric AVM Bleeding  #Moderate Diverticulosis   #Grade II Internal Hemorrhoids  #Erosive Gastritis  - Hemodynamically stable in ED, OLEKSANDR with brown stool, Hb 4.5 s/p scheduled for 3 units pRBC and lasix IV   - Trend CBC closely and transfuse as needed (avoid volume overload with diuresis but keep an eye on BP in setting of severe AS):  - Active Type and Screen  - Check coagulation profile  - 2 Large IV Bores  - Start IV Protonix 40mg BID   - Keep Patient NPO for now  - Anemia Likely Secondary to obscure GI bleeding (from Duodenum and Gastric AVM per recent capsule endoscopy 02/2022 after presenting with melena), Fe Deficiency (TFN S% 5 in 2020 and ferritin 28 in 2022), and CKD per Dr Silver  - Receives Transfusions q1-2 weeks and Retacrit 36724 units weekly  - Recent Admission in 02/2022 for melena: s/p capsule endoscopy showing bleeding from Duodenum and Gastric AVM   - Moderate Diverticulosis and Grade II Internal Hemorrhoids on colonoscopy 10/22/2019  - Erosive Gastritis on EGD 01/09/2020  - Off DVT prophylaxis  - Monitor BM:      #Hypertension  - Home lopressor 25mg BID  - Monitor BP and avoid hypotension in setting of severe AS  - Resume home anti HTN   - Diuretics as above      #CKD 4  - Baseline Cr 1.7-2.2 2022  - Trend CMP and P daily  - Outpatient nephrology follow up        #MISC  - DVT Prophylaxis: off  - GI Prophylaxis Protonix  - Diet: NPO  - Code Status: Full   76 year old female patient with PMHx of Severe AS, Anemia Likely Secondary to obscure GI bleeding (from Duodenum and Gastric AVM per recent capsule endoscopy 02/2022 after presenting with melena), DARRIN ( follows with Dr Silver. Receives Transfusions q1-2 weeks and Retacrit 17723 units weekly), Hypertension, CKD4., Sciatica presents for    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 and Gastric AVM Bleeding  #Moderate Diverticulosis   #Grade II Internal Hemorrhoids  #Erosive Gastritis  - lethargy and mild SOB  - Hb 5.5  - Hemodynamically stable in ED  - s/p 1 unit PRBC in ED  - Trend CBC closely and transfuse as needed  - Active Type and Screen  - Check coagulation profile  - 2 Large IV Bores  - Start IV Protonix 40mg BID   - clear liquid diet  - Anemia Likely Secondary to obscure GI bleeding   - Receives Transfusions q1-2 weeks and Retacrit 31887 units weekly  - Recent Admission in 02/2022 for melena: s/p capsule endoscopy showing bleeding from Duodenum and Gastric AVM   - Moderate Diverticulosis and Grade II Internal Hemorrhoids on colonoscopy 10/22/2019  - Erosive Gastritis on EGD 01/09/2020  - Off DVT prophylaxis  - Monitor BM  - GI eval  - hematology eval  - will most likely need cardio eval (Dr Paula) if any procedure planned by GI team  - benadryl before transfusions      #Hypertension  - Home lopressor 25mg BID  - Monitor BP and avoid hypotension in setting of severe AS  - Resume home anti HTN     #H/O severe AS  - TTE 02/22 -> EF 74%  - severe AS  - followed by Dr Paula  - as per cardio last note -> Any Valve procedure either surgical or TAVR would require correction of the patient's anemia and optimization of her Hg/HCT to a tolerable level to proceed   - as per hem onc during last admission -> If hemoglobin of 10 is needed and patient continues to bleed it may not be possible to achieve this goal for the TAVR, thus we can administer a unit a day as long as the cardiology team feels patient will be able to tolerate this order to achieve the goal for her procedure      #CKD 4  - creat 2.3 Baseline Cr 1.7-2.3 2022  - Trend CMP and P daily  - Outpatient nephrology follow up  - c/w home torsemide dose      #MISC  - DVT Prophylaxis: off  - GI Prophylaxis Protonix  - Diet: Clear liquid  - Code Status: Full

## 2022-10-11 NOTE — ED PROVIDER NOTE - PHYSICAL EXAMINATION
Constitutional: chronically ill appearing. No acute distress. Non toxic.   Eyes: PERRLA. Extraocular movements intact, no entrapment. Conjunctiva normal.   ENT: No nasal discharge. Moist mucus membranes.  Neck: Supple, non tender, full range of motion.  CV: RRR no murmurs, rubs, or gallops. +S1S2.   Pulm: Clear to auscultation bilaterally. Normal work of breathing.  Abd: soft NT ND +BS.   Ext: Warm and well perfused x4, moving all extremities, no edema.   Psy: Cooperative, appropriate.   Skin: Warm, dry, no rash  Neuro: CN2-12 grossly intact no sensory or motor deficits throughout, no drift, no ataxia

## 2022-10-11 NOTE — H&P ADULT - NSHPPHYSICALEXAM_GEN_ALL_CORE
GENERAL: NAD, lying in bed comfortably  HEAD:  Atraumatic, Normocephalic  EYES: EOMI, PERRLA, conjunctiva and sclera clear  ENT: Moist mucous membranes  NECK: Supple, No JVD  CHEST/LUNG: Clear to auscultation bilaterally; No rales, rhonchi, wheezing, or rubs. Unlabored respirations  HEART: Regular rate and rhythm; No murmurs, rubs, or gallops  ABDOMEN: BSx4; Soft, nontender, nondistended  EXTREMITIES:  2+ Peripheral Pulses, brisk capillary refill. No clubbing, cyanosis, or edema  NERVOUS SYSTEM:  A&Ox3, no focal deficits   SKIN: No rashes or lesions GENERAL: NAD, lying in bed comfortably  HEAD:  Atraumatic, Normocephalic  EYES: EOMI, PERRLA, conjunctiva and sclera clear  ENT: Moist mucous membranes  NECK: Supple, No JVD  CHEST/LUNG: Clear to auscultation bilaterally; No rales, rhonchi, wheezing, or rubs. Unlabored respirations. port on right chest wall without erythema or tenderness  HEART: Regular rate and rhythm; No murmurs, rubs, or gallops  ABDOMEN: BSx4; Soft, nontender, nondistended  EXTREMITIES:  2+ Peripheral Pulses, brisk capillary refill. No clubbing, cyanosis, or edema  NERVOUS SYSTEM:  A&Ox3, no focal deficits   SKIN: No rashes or lesions

## 2022-10-12 ENCOUNTER — APPOINTMENT (OUTPATIENT)
Dept: INFUSION THERAPY | Facility: CLINIC | Age: 77
End: 2022-10-12

## 2022-10-12 LAB
ALBUMIN SERPL ELPH-MCNC: 3.5 G/DL — SIGNIFICANT CHANGE UP (ref 3.5–5.2)
ALP SERPL-CCNC: 82 U/L — SIGNIFICANT CHANGE UP (ref 30–115)
ALT FLD-CCNC: 5 U/L — SIGNIFICANT CHANGE UP (ref 0–41)
ANION GAP SERPL CALC-SCNC: 12 MMOL/L — SIGNIFICANT CHANGE UP (ref 7–14)
AST SERPL-CCNC: 12 U/L — SIGNIFICANT CHANGE UP (ref 0–41)
BASOPHILS # BLD AUTO: 0 K/UL — SIGNIFICANT CHANGE UP (ref 0–0.2)
BASOPHILS NFR BLD AUTO: 0 % — SIGNIFICANT CHANGE UP (ref 0–1)
BILIRUB SERPL-MCNC: 1.1 MG/DL — SIGNIFICANT CHANGE UP (ref 0.2–1.2)
BUN SERPL-MCNC: 54 MG/DL — HIGH (ref 10–20)
CALCIUM SERPL-MCNC: 8.4 MG/DL — SIGNIFICANT CHANGE UP (ref 8.4–10.4)
CHLORIDE SERPL-SCNC: 115 MMOL/L — HIGH (ref 98–110)
CO2 SERPL-SCNC: 16 MMOL/L — LOW (ref 17–32)
CREAT SERPL-MCNC: 2.1 MG/DL — HIGH (ref 0.7–1.5)
EGFR: 24 ML/MIN/1.73M2 — LOW
EOSINOPHIL # BLD AUTO: 0.02 K/UL — SIGNIFICANT CHANGE UP (ref 0–0.7)
EOSINOPHIL NFR BLD AUTO: 0.7 % — SIGNIFICANT CHANGE UP (ref 0–8)
FERRITIN SERPL-MCNC: 142 NG/ML — SIGNIFICANT CHANGE UP (ref 15–150)
FERRITIN SERPL-MCNC: 22 NG/ML
GLUCOSE SERPL-MCNC: 165 MG/DL — HIGH (ref 70–99)
HCT VFR BLD CALC: 24.2 % — LOW (ref 37–47)
HGB BLD-MCNC: 7.7 G/DL — LOW (ref 12–16)
IMM GRANULOCYTES NFR BLD AUTO: 1.1 % — HIGH (ref 0.1–0.3)
LYMPHOCYTES # BLD AUTO: 0.24 K/UL — LOW (ref 1.2–3.4)
LYMPHOCYTES # BLD AUTO: 8.6 % — LOW (ref 20.5–51.1)
MAGNESIUM SERPL-MCNC: 2.4 MG/DL — SIGNIFICANT CHANGE UP (ref 1.8–2.4)
MCHC RBC-ENTMCNC: 28.3 PG — SIGNIFICANT CHANGE UP (ref 27–31)
MCHC RBC-ENTMCNC: 31.8 G/DL — LOW (ref 32–37)
MCV RBC AUTO: 89 FL — SIGNIFICANT CHANGE UP (ref 81–99)
MONOCYTES # BLD AUTO: 0.02 K/UL — LOW (ref 0.1–0.6)
MONOCYTES NFR BLD AUTO: 0.7 % — LOW (ref 1.7–9.3)
NEUTROPHILS # BLD AUTO: 2.48 K/UL — SIGNIFICANT CHANGE UP (ref 1.4–6.5)
NEUTROPHILS NFR BLD AUTO: 88.9 % — HIGH (ref 42.2–75.2)
NRBC # BLD: 0 /100 WBCS — SIGNIFICANT CHANGE UP (ref 0–0)
PLATELET # BLD AUTO: 93 K/UL — LOW (ref 130–400)
POTASSIUM SERPL-MCNC: 5.4 MMOL/L — HIGH (ref 3.5–5)
POTASSIUM SERPL-SCNC: 5.4 MMOL/L — HIGH (ref 3.5–5)
PROT SERPL-MCNC: 5.5 G/DL — LOW (ref 6–8)
RBC # BLD: 2.72 M/UL — LOW (ref 4.2–5.4)
RBC # FLD: 16.1 % — HIGH (ref 11.5–14.5)
SODIUM SERPL-SCNC: 143 MMOL/L — SIGNIFICANT CHANGE UP (ref 135–146)
WBC # BLD: 2.79 K/UL — LOW (ref 4.8–10.8)
WBC # FLD AUTO: 2.79 K/UL — LOW (ref 4.8–10.8)

## 2022-10-12 PROCEDURE — 99223 1ST HOSP IP/OBS HIGH 75: CPT

## 2022-10-12 PROCEDURE — 99222 1ST HOSP IP/OBS MODERATE 55: CPT

## 2022-10-12 PROCEDURE — 99221 1ST HOSP IP/OBS SF/LOW 40: CPT

## 2022-10-12 RX ORDER — SODIUM ZIRCONIUM CYCLOSILICATE 10 G/10G
10 POWDER, FOR SUSPENSION ORAL ONCE
Refills: 0 | Status: COMPLETED | OUTPATIENT
Start: 2022-10-12 | End: 2022-10-12

## 2022-10-12 RX ORDER — PANTOPRAZOLE SODIUM 20 MG/1
40 TABLET, DELAYED RELEASE ORAL
Refills: 0 | Status: DISCONTINUED | OUTPATIENT
Start: 2022-10-12 | End: 2022-10-15

## 2022-10-12 RX ORDER — DIPHENHYDRAMINE HCL 50 MG
50 CAPSULE ORAL ONCE
Refills: 0 | Status: COMPLETED | OUTPATIENT
Start: 2022-10-12 | End: 2022-10-12

## 2022-10-12 RX ORDER — METOPROLOL TARTRATE 50 MG
25 TABLET ORAL
Refills: 0 | Status: DISCONTINUED | OUTPATIENT
Start: 2022-10-12 | End: 2022-10-16

## 2022-10-12 RX ORDER — LANOLIN ALCOHOL/MO/W.PET/CERES
3 CREAM (GRAM) TOPICAL AT BEDTIME
Refills: 0 | Status: DISCONTINUED | OUTPATIENT
Start: 2022-10-12 | End: 2022-10-16

## 2022-10-12 RX ORDER — ACETAMINOPHEN 500 MG
650 TABLET ORAL EVERY 6 HOURS
Refills: 0 | Status: DISCONTINUED | OUTPATIENT
Start: 2022-10-12 | End: 2022-10-16

## 2022-10-12 RX ORDER — ONDANSETRON 8 MG/1
4 TABLET, FILM COATED ORAL EVERY 8 HOURS
Refills: 0 | Status: DISCONTINUED | OUTPATIENT
Start: 2022-10-12 | End: 2022-10-16

## 2022-10-12 RX ADMIN — Medication 50 MILLIGRAM(S): at 16:02

## 2022-10-12 RX ADMIN — Medication 20 MILLIGRAM(S): at 06:02

## 2022-10-12 RX ADMIN — PANTOPRAZOLE SODIUM 40 MILLIGRAM(S): 20 TABLET, DELAYED RELEASE ORAL at 18:44

## 2022-10-12 RX ADMIN — SODIUM ZIRCONIUM CYCLOSILICATE 10 GRAM(S): 10 POWDER, FOR SUSPENSION ORAL at 18:44

## 2022-10-12 RX ADMIN — Medication 25 MILLIGRAM(S): at 18:44

## 2022-10-12 RX ADMIN — PANTOPRAZOLE SODIUM 40 MILLIGRAM(S): 20 TABLET, DELAYED RELEASE ORAL at 06:30

## 2022-10-12 RX ADMIN — Medication 25 MILLIGRAM(S): at 06:02

## 2022-10-12 RX ADMIN — Medication 650 MILLIGRAM(S): at 00:50

## 2022-10-12 RX ADMIN — Medication 125 MILLIGRAM(S): at 16:02

## 2022-10-12 NOTE — CONSULT NOTE ADULT - SUBJECTIVE AND OBJECTIVE BOX
HPI:  76 year old female patient with PMHx of Severe AS, Anemia Likely Secondary to obscure GI bleeding (from Duodenum and Gastric AVM per recent capsule endoscopy 02/2022 after presenting with melena), DARRIN ( follows with Dr Silver. Receives Transfusions q1-2 weeks and Retacrit 17459 units weekly), Hypertension, CKD4., Sciatica presents for the above chief complaint. patient saw her hematologist few days ago, she was complaining of fatigue and mild shortness of breath on exertion. she denied chest pain, fever, chills, abdominal pain, N/V, hematuria, hematochezia or melena. she was sent to the ED by her hematologist after OP labs showed severe anemia      VITALS:   T(C): 36.6 (10-11-22 @ 15:46), Max: 36.6 (10-11-22 @ 15:46)  HR: 74 (10-11-22 @ 15:46) (74 - 74)  BP: 107/58 (10-11-22 @ 15:46) (107/58 - 107/58)  RR: 16 (10-11-22 @ 15:46) (16 - 16)  SpO2: 98% (10-11-22 @ 15:46) (98% - 98%)    in ED, vitals were WNL. labs were significant for a Hb od 5.5 and creat 2.3. patient received 1 unit PRBC and admitted to medicine for further investigations (11 Oct 2022 22:39)      PAST MEDICAL & SURGICAL HISTORY  HTN (hypertension)    Heart murmur    Eczema    Anemia  iron infusions and PRBC transfusions    Aortic stenosis    Chronic kidney disease (CKD)    2019 novel coronavirus disease (COVID-19)  4/2020- REHAB admission    H/O appendicitis    H/O cholecystitis  s/p cholecystectomy        FAMILY HISTORY:  FAMILY HISTORY:  FH: kidney disease (Father)        SOCIAL HISTORY:  []smoker  []Alcohol  []Drug    ALLERGIES:  aspirin (Rash)  latex (Unknown)  penicillins (Unknown)      MEDICATIONS:  MEDICATIONS  (STANDING):  metoprolol tartrate 25 milliGRAM(s) Oral two times a day  pantoprazole  Injectable 40 milliGRAM(s) IV Push two times a day  torsemide 20 milliGRAM(s) Oral daily    MEDICATIONS  (PRN):  acetaminophen     Tablet .. 650 milliGRAM(s) Oral every 6 hours PRN Temp greater or equal to 38C (100.4F), Mild Pain (1 - 3), Moderate Pain (4 - 6), Severe Pain (7 - 10)  aluminum hydroxide/magnesium hydroxide/simethicone Suspension 30 milliLiter(s) Oral every 4 hours PRN Dyspepsia  melatonin 3 milliGRAM(s) Oral at bedtime PRN Insomnia  ondansetron Injectable 4 milliGRAM(s) IV Push every 8 hours PRN Nausea and/or Vomiting      HOME MEDICATIONS:  Home Medications:  Metoprolol Tartrate 25 mg oral tablet: 1 tab(s) orally 2 times a day (12 Oct 2022 01:08)  pantoprazole 40 mg oral delayed release tablet: 1 tab(s) orally 2 times a day (12 Oct 2022 01:08)      VITALS:   T(F): 97 (10-12 @ 07:34), Max: 98.2 (10-12 @ 00:30)  HR: 60 (10-12 @ 07:34) (60 - 89)  BP: 117/55 (10-12 @ 07:34) (107/58 - 137/60)  BP(mean): --  RR: 18 (10-12 @ 07:34) (16 - 18)  SpO2: 99% (10-12 @ 07:34) (95% - 99%)    I&O's Summary      REVIEW OF SYSTEMS:  CONSTITUTIONAL: No weakness, fevers or chills  EYES: No visual changes  ENT: No vertigo or throat pain   NECK: No pain or stiffness  RESPIRATORY: No cough, wheezing, hemoptysis; No shortness of breath  CARDIOVASCULAR: No chest pain or palpitations  GASTROINTESTINAL: No abdominal or epigastric pain. No nausea, vomiting, or hematemesis; No diarrhea or constipation. No melena or hematochezia.  GENITOURINARY: No dysuria, frequency or hematuria  NEUROLOGICAL: No numbness or weakness  SKIN: No itching, no rashes  MSK: No pain    PHYSICAL EXAM:  NEURO: patient is awake , alert and oriented  GEN: Not in acute distress  NECK: no thyroid enlargement, no JVD  LUNGS: Clear to auscultation bilaterally   CARDIOVASCULAR: S1/S2 present, RRR , + midsystolic murmur most pronounced in R 2nd intercostal space, no carotid bruits,  + PP bilaterally  ABD: Soft, non-tender, non-distended, +BS  EXT: No NIKOLAI  SKIN: Intact    LABS:                        7.7    2.79  )-----------( 93       ( 12 Oct 2022 07:05 )             24.2     10-12    143  |  115<H>  |  54<H>  ----------------------------<  165<H>  5.4<H>   |  16<L>  |  2.1<H>    Ca    8.4      12 Oct 2022 07:05  Mg     2.4     10-12    TPro  5.5<L>  /  Alb  3.5  /  TBili  1.1  /  DBili  x   /  AST  12  /  ALT  5   /  AlkPhos  82  10-12    PT/INR - ( 11 Oct 2022 20:32 )   PT: 11.40 sec;   INR: 1.00 ratio         PTT - ( 11 Oct 2022 20:32 )  PTT:32.5 sec          Troponin trend: n/a            RADIOLOGY:  -TTE 2/17/22: EF 74%, normal LVSF, severe AS, peak gradient 86.1, mean 48.9, valve area 0.8    ECG:  n/a    TELEMETRY EVENTS:  n/a

## 2022-10-12 NOTE — CONSULT NOTE ADULT - ATTENDING COMMENTS
Briefly, 76 year old woman with severe aortic stenosis. She is at elevated risk for this low risk procedure because of her severe aortic stenosis. She does not need further cardiac testing at this time. Would recommend cardiac anesthesia.     Thank you for this consult. Please call/ MS teams with questions.

## 2022-10-12 NOTE — CONSULT NOTE ADULT - SUBJECTIVE AND OBJECTIVE BOX
Hematology Consult Note    HPI:  76 year old female patient with PMHx of Severe AS, Anemia Likely Secondary to obscure GI bleeding (from Duodenum and Gastric AVM per recent capsule endoscopy 02/2022 after presenting with melena), DARRIN ( follows with Dr Silver. Receives Transfusions q1-2 weeks and Retacrit 89987 units weekly), Hypertension, CKD4., Sciatica presents for the above chief complaint. patient saw her hematologist few days ago, she was complaining of fatigue and mild shortness of breath on exertion. she denied chest pain, fever, chills, abdominal pain, N/V, hematuria, hematochezia or melena. she was sent to the ED by her hematologist after OP labs showed severe anemia      VITALS:   T(C): 36.6 (10-11-22 @ 15:46), Max: 36.6 (10-11-22 @ 15:46)  HR: 74 (10-11-22 @ 15:46) (74 - 74)  BP: 107/58 (10-11-22 @ 15:46) (107/58 - 107/58)  RR: 16 (10-11-22 @ 15:46) (16 - 16)  SpO2: 98% (10-11-22 @ 15:46) (98% - 98%)    in ED, vitals were WNL. labs were significant for a Hb od 5.5 and creat 2.3. patient received 1 unit PRBC and admitted to medicine for further investigations (11 Oct 2022 22:39)      Allergies    aspirin (Rash)  latex (Unknown)  penicillins (Unknown)    Intolerances        MEDICATIONS  (STANDING):  metoprolol tartrate 25 milliGRAM(s) Oral two times a day  pantoprazole  Injectable 40 milliGRAM(s) IV Push two times a day  torsemide 20 milliGRAM(s) Oral daily    MEDICATIONS  (PRN):  acetaminophen     Tablet .. 650 milliGRAM(s) Oral every 6 hours PRN Temp greater or equal to 38C (100.4F), Mild Pain (1 - 3), Moderate Pain (4 - 6), Severe Pain (7 - 10)  aluminum hydroxide/magnesium hydroxide/simethicone Suspension 30 milliLiter(s) Oral every 4 hours PRN Dyspepsia  melatonin 3 milliGRAM(s) Oral at bedtime PRN Insomnia  ondansetron Injectable 4 milliGRAM(s) IV Push every 8 hours PRN Nausea and/or Vomiting      PAST MEDICAL & SURGICAL HISTORY:  HTN (hypertension)    Heart murmur    Eczema    Anemia  iron infusions and PRBC transfusions      Aortic stenosis    Chronic kidney disease (CKD)    2019 novel coronavirus disease (COVID-19)  4/2020- REHAB admission      H/O appendicitis    H/O cholecystitis  s/p cholecystectomy      FAMILY HISTORY:  FH: kidney disease (Father)      SOCIAL HISTORY: No EtOH, no tobacco    REVIEW OF SYSTEMS:    CONSTITUTIONAL: No weakness, fevers or chills  RESPIRATORY: No cough, wheezing, hemoptysis; No shortness of breath  CARDIOVASCULAR: No chest pain or palpitations  GASTROINTESTINAL: No abdominal or epigastric pain. No nausea, vomiting, or hematemesis. No melena or hematochezia.  GENITOURINARY: No dysuria, frequency or hematuria  NEUROLOGICAL: No numbness or weakness  All other review of systems is negative unless indicated above.    Height (cm): 170.2 (10-11 @ 15:46)  Weight (kg): 107 (10-11 @ 15:46)  BMI (kg/m2): 36.9 (10-11 @ 15:46)  BSA (m2): 2.17 (10-11 @ 15:46)    T(F): 97 (10-12-22 @ 07:34), Max: 98.2 (10-12-22 @ 00:30)  HR: 60 (10-12-22 @ 07:34)  BP: 117/55 (10-12-22 @ 07:34)  RR: 18 (10-12-22 @ 07:34)  SpO2: 99% (10-12-22 @ 07:34)  Wt(kg): --    PHYSICAL EXAM:  GENERAL: NAD, well-developed  HEAD:  Atraumatic, Normocephalic  EYES: EOMI, PERRLA, conjunctiva and sclera clear  NECK: Supple, No JVD  CHEST/LUNG: Clear to auscultation bilaterally; No wheeze  HEART: Regular rate and rhythm; No murmur  ABDOMEN: Soft, Nontender, Nondistended; Bowel sounds present  EXTREMITIES:  2+ Peripheral Pulses, No clubbing, cyanosis, or edema                          7.7    2.79  )-----------( 93       ( 12 Oct 2022 07:05 )             24.2       10-12    143  |  115<H>  |  54<H>  ----------------------------<  165<H>  5.4<H>   |  16<L>  |  2.1<H>    Ca    8.4      12 Oct 2022 07:05  Mg     2.4     10-12    TPro  5.5<L>  /  Alb  3.5  /  TBili  1.1  /  DBili  x   /  AST  12  /  ALT  5   /  AlkPhos  82  10-12      Magnesium, Serum: 2.4 mg/dL (10-12 @ 07:05)

## 2022-10-12 NOTE — CONSULT NOTE ADULT - ATTENDING COMMENTS
I edited the note.     Agree w/ above - pt w/  Severe AS, presenting w/ symptomatic anemia and melena.   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   Would benefit from EGD / enteroscopy with cardiac anesthesia - cardio eval appreciated, high risk for low risk procedure.  Rest of recs as above.    Time-based billing (NON-critical care).   70 minutes spent on total encounter; more than 50% of the visit was spent counseling and / or coordinating care by the attending physician.  The necessity of the time spent during the encounter on this date of service was due to: Coordination of care. I edited the note.     Agree w/ above - pt w/  Severe AS, presenting w/ symptomatic anemia and melena.   although pt's anemia is multifactorial in the setting of CKD and severe AS (Heyde syndrome), she had bleeding on her last capsule in 3/2022 which was never addressed.   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   Would benefit from EGD / enteroscopy with cardiac anesthesia - cardio eval appreciated, high risk for low risk procedure.  Rest of recs as above.    Time-based billing (NON-critical care).   70 minutes spent on total encounter; more than 50% of the visit was spent counseling and / or coordinating care by the attending physician.  The necessity of the time spent during the encounter on this date of service was due to: Coordination of care.

## 2022-10-12 NOTE — PATIENT PROFILE ADULT - REASON FOR REFUSAL (REFER PATIENT TO HEALTHCARE PROVIDER FOR FOLLOW-UP):
CC: Patient here to establish care.  Patient has some concerns about abnormal thyroid labs she obtained in October 2020 as well as concerns about her acne and difficulty losing weight.    HPI:  Erica presents with the following    Acne vulgaris  Started about 5 years ago. Occurs mostly on her chin and lower face as well as the nape of her neck.  Patient states that this does worsen approximately 5 to 7 days prior to her menstrual period.  Patient states that her period is very regular and occurs every 28 days.  Uses over the counter face wash. Has a skin routine with sensitive skin products.     Unexplained weight gain  Patient has had increased weight gain over the last 2 years.  Patient noticed that when she had changed birth controls and so at that point she had stopped taking birth control.  Patient has not been on birth control since then.  Patient has had difficulty losing this weight.  Patient states that starting in January 2019 she went into a very restrictive diet with significant exercise daily and was only able to lose about 15 pounds.  However, since she stopped that routine she has gained some of that weight back.  Patient states that she watches what she eats and continues to exercise.  Despite this she cannot lose weight.    Hypothyroidism, acquired, autoimmune  Patient recently had some labs done when she went back to her home country of [place].  Patient had elevated TPO antibodies however, her TSH and T4 were within normal ranges.  Patient requested that her previous primary care provider in [place] order these labs due to increased weight gain, acne, and fatigue.    Patient denies constipation.       Patient Active Problem List    Diagnosis Date Noted   • Hypothyroidism, acquired, autoimmune 02/08/2021   • Acne vulgaris 02/08/2021   • Seasonal allergic rhinitis 02/08/2021   • Unexplained weight gain 02/08/2021       Current Outpatient Medications   Medication Sig Dispense Refill   •  fluticasone (FLONASE) 50 MCG/ACT nasal spray Administer 2 Sprays into affected nostril(S) every day. 16 g 11   • tretinoin (RETIN-A) 0.025 % cream Apply nightly at bedtime to affected area. 45 g 3     No current facility-administered medications for this visit.        Allergies as of 02/08/2021   • (No Known Allergies)        Social History     Socioeconomic History   • Marital status:      Spouse name: Not on file   • Number of children: Not on file   • Years of education: Not on file   • Highest education level: Master's degree (e.g., MA, MS, Nhan, MEd, MSW, ANTHONY)   Occupational History   • Not on file   Social Needs   • Financial resource strain: Not hard at all   • Food insecurity     Worry: Never true     Inability: Never true   • Transportation needs     Medical: No     Non-medical: No   Tobacco Use   • Smoking status: Never Smoker   • Smokeless tobacco: Never Used   Substance and Sexual Activity   • Alcohol use: Not Currently     Frequency: 2-4 times a month     Drinks per session: 3 or 4     Binge frequency: Less than monthly   • Drug use: Not on file   • Sexual activity: Yes     Partners: Male     Birth control/protection: Other-See Comments     Comment: none at this time   Lifestyle   • Physical activity     Days per week: 7 days     Minutes per session: 90 min   • Stress: Only a little   Relationships   • Social connections     Talks on phone: More than three times a week     Gets together: Once a week     Attends Yazidi service: Never     Active member of club or organization: No     Attends meetings of clubs or organizations: Never     Relationship status:    • Intimate partner violence     Fear of current or ex partner: Not on file     Emotionally abused: Not on file     Physically abused: Not on file     Forced sexual activity: Not on file   Other Topics Concern   • Not on file   Social History Narrative   • Not on file       History reviewed. No pertinent family history.    History  reviewed. No pertinent past medical history.     History reviewed. No pertinent surgical history.    ROS:  Gen: no fevers/chills  Pulm: no sob, no cough  CV: no chest pain, no palpitations  GI: no nausea/vomiting, no diarrhea  Neuro: no headaches, no numbness/tingling      Exam:   Vitals:    02/08/21 1101   BP: 116/66   Pulse: 88   Resp: 16   Temp: 36.6 °C (97.8 °F)   SpO2: 95%     Body mass index is 25.55 kg/m².    General: Normal appearing. No distress.  Head: Normocephalic.  Atraumatic.  Eyes: conjunctiva clear, pupils equal and reactive to light accommodation,  Ears: normal shape and contour, canals are clear bilaterally, tympanic membranes are benign  Throat: Oropharynx is without erythema, edema or exudates.   Neck: Supple without JVD.Thyroid is not enlarged.  Pulmonary: Clear to ausculation.  Normal effort. No rales, ronchi, or wheezing.  Cardiovascular: Regular rate and rhythm without murmur. Carotid and radial pulses are intact and equal bilaterally.  Pedal pulses within normal limits.  Abdomen: Soft, nontender, nondistended. Normal bowel sounds. Liver and spleen are not palpable.  Neurologic: Grossly intact.  Sensation intact.  Lymph: No cervical or supraclavicular lymph nodes are palpable.  Skin: Warm and dry.  Patient with multiple areas of erythematous cystic lesions on jaw, neck, and lower face.  Musculoskeletal: No extremity cyanosis, clubbing, or edema.  Strength 5+ and equal bilaterally in all extremities.  Psych: Normal mood and affect. Alert and oriented x3. Judgment and insight is normal.        Assessment and Plan.   29 y.o. female presenting with the following.     1. Hypothyroidism, acquired, autoimmune  Patient recently had abnormal TPO labs drawn in [place], in October 2020, where her previous primary care provider was.  Patient has been having difficulty losing weight, increased acne, and fatigue.  This is why she asked her previous primary care provider to order labs for her.  At this time  we will redraw these labs and recheck to see if her TPO antibodies are still elevated, as well as see if she has had any changes in her TSH or free T4.  - TSH; Future  - TRIIDOTHYRONINE; Future  - FREE THYROXINE; Future  - THYROID PEROXIDASE  (TPO) AB; Future    2. Acne vulgaris  This has been a chronic issue for the patient.  Patient has been using over-the-counter acne face washes and facial moisturizers without improvement.  Patient's acne does worsen around her menstrual cycle.  At this time will order topical retinoid for the patient to use nightly at bedtime.  Will start with lower strength at this time.  We will follow-up in 3 months.  If patient is having some effectiveness we will either continue medication or consider increasing strength.    3. Unexplained weight gain  Patient has been struggling this for approximately 2 to 3 years.  When patient started birth control several years ago she had started having weight gain.  She stopped taking birth control due to this issue.  However, since then patient has had difficulty losing the weight that she did gain.  Patient states the only way she was able to lose the weight was a very strict restrictive diet as well as intensive exercise daily.  Even with this routine patient states she is only able to lose about 10 to 15 pounds.  At this time patient still watches what she eats and exercises however not as restrictive or intense as she did last year.  Patient's unexplained weight gain may also be due to possible thyroid issues.  We will also consider other hormone imbalance and/or metabolic issues.  At this point will obtain baseline labs and follow-up with patient in 3 months.  - TSH; Future  - TRIIDOTHYRONINE; Future  - FREE THYROXINE; Future  - VITAMIN D,25 HYDROXY; Future  - Lipid Profile; Future  - CBC WITHOUT DIFFERENTIAL; Future  - Comp Metabolic Panel; Future  - HEMOGLOBIN A1C; Future  - INSULIN FASTING; Future    4. Seasonal allergic rhinitis,  unspecified trigger  This has been an issue for the patient since moving from [place] to [facility].  Patient uses Flonase daily for this issue.  Patient states her symptoms are mostly watery nose and eyes.  Patient states she has also had previously had a Kenalog shot for this issue. patient does take over-the-counter allergy medication however sparingly.  Encouraged patient to take on a consistent basis to see if this will help her allergies.  If patient continues to suffer from severe allergies despite Flonase and over-the-counter first-generation antihistamine, will consider referral to allergist.    Return in about 3 months (around 5/8/2021) for labs and acne.      I have placed the below orders and discussed them with an approved delegating provider.  The MA is performing the below orders under the direction of Dr. Santiago.    Please note that this dictation was created using voice recognition software. I have worked with consultants from the vendor as well as technical experts from [employer] to optimize the interface. I have made every reasonable attempt to correct obvious errors, but I expect that there are errors of grammar and possibly content that I did not discover before finalizing the note.   Patient does not want at this time

## 2022-10-12 NOTE — PATIENT PROFILE ADULT - FALL HARM RISK - HARM RISK INTERVENTIONS

## 2022-10-12 NOTE — ED ADULT NURSE REASSESSMENT NOTE - NS ED NURSE REASSESS COMMENT FT1
blood bank tech rico made aware blood warmer is going to 3b with the patient.   PRBCs transfusing as ordered. no s/s adverse reaction noted. v/s stable. see transfusion flow sheet for further documentation/vitals.

## 2022-10-12 NOTE — CONSULT NOTE ADULT - ASSESSMENT
76 year old F with history of transfusion depended anemia 2/2 GI bleeding. Patient was sent by Dr. Gaston because her hgb yesterday was 5.9. She endorsed SOB and fatigue and denied any bleeding.       IMPRESSION:  #Anemia likely 2/2 obscure GI bleeding  - Patient's hemoglobin is usually around 7, on admission it was 5.9  - S/P 2 units PRBC --> hgb 7.7  - Patients anemia has been worsening as of a month ago, she was receiving transfusions q2 weeks and now every week  - Capsule endoscopy in earlier 2022 was positive for bleeding in the stomach and AVM in duodenum, further intervention was suggested but patient denied  - On Retacrit 30K weekly  - As per Dr. Gaston, he suggests further GI evaluation      RECOMMENDATIONS:   - recs to follow after attending rounds 76 year old F with history of transfusion depended anemia 2/2 GI bleeding. Patient was sent by Dr. Gaston because her hgb yesterday was 5.9. She endorsed SOB and fatigue and denied any bleeding.       IMPRESSION:  #Anemia likely 2/2 obscure GI bleeding  - Patient's hemoglobin is usually around 7, on admission it was 5.9  - S/P 2 units PRBC --> hgb 7.7  - Patients anemia has been worsening as of a month ago, she was receiving transfusions q2 weeks and now every week  - Capsule endoscopy in earlier 2022 was positive for bleeding in the stomach and AVM in duodenum, further intervention was suggested but patient denied  - On Retacrit 30K weekly (Received 10/11/22)      RECOMMENDATIONS:     -S/p 3U PRBC; would not recommend more PRBC, and would give lasix along with PRBC to avoid volume overload given hx of AS  -GI on board: Plan for EGD/colonoscopy, will f/u results   -Can give 1 dose of Venofer 200mg IV   -Repeat iron studies, B12, folate, LDh, direct haroldo test

## 2022-10-12 NOTE — CONSULT NOTE ADULT - SUBJECTIVE AND OBJECTIVE BOX
Gastroenterology Consultation:    Patient is a 76y old  Female who presents with a chief complaint of anemia on outpatient labs (12 Oct 2022 11:20)      Admitted on: 10-11-22  HPI:  76 year old female patient with PMHx of Severe AS, Anemia Likely Secondary to obscure GI bleeding (from Duodenum and Gastric AVM per recent capsule endoscopy 02/2022 after presenting with melena), DARRIN ( follows with Dr Silver. Receives Transfusions q1-2 weeks and Retacrit 98490 units weekly), Hypertension, CKD4., Sciatica presents for the above chief complaint. patient saw her hematologist few days ago, she was complaining of fatigue and mild shortness of breath on exertion. she denied chest pain, fever, chills, abdominal pain, N/V, hematuria, hematochezia or melena. she was sent to the ED by her hematologist after OP labs showed severe anemia      VITALS:   T(C): 36.6 (10-11-22 @ 15:46), Max: 36.6 (10-11-22 @ 15:46)  HR: 74 (10-11-22 @ 15:46) (74 - 74)  BP: 107/58 (10-11-22 @ 15:46) (107/58 - 107/58)  RR: 16 (10-11-22 @ 15:46) (16 - 16)  SpO2: 98% (10-11-22 @ 15:46) (98% - 98%)    in ED, vitals were WNL. labs were significant for a Hb od 5.5 and creat 2.3. patient received 1 unit PRBC and admitted to medicine for further investigations           PAST MEDICAL & SURGICAL HISTORY:  HTN (hypertension)      Heart murmur      Eczema      Anemia  iron infusions and PRBC transfusions      Aortic stenosis      Chronic kidney disease (CKD)      2019 novel coronavirus disease (COVID-19)  4/2020- REHAB admission      H/O appendicitis      H/O cholecystitis  s/p cholecystectomy          FAMILY HISTORY:  FH: kidney disease (Father)        Social History:  Tobacco: N  Alcohol: N  Drugs: N    Home Medications:  Metoprolol Tartrate 25 mg oral tablet: 1 tab(s) orally 2 times a day (12 Oct 2022 01:08)  pantoprazole 40 mg oral delayed release tablet: 1 tab(s) orally 2 times a day (12 Oct 2022 01:08)    MEDICATIONS  (STANDING):  metoprolol tartrate 25 milliGRAM(s) Oral two times a day  pantoprazole  Injectable 40 milliGRAM(s) IV Push two times a day  torsemide 20 milliGRAM(s) Oral daily    MEDICATIONS  (PRN):  acetaminophen     Tablet .. 650 milliGRAM(s) Oral every 6 hours PRN Temp greater or equal to 38C (100.4F), Mild Pain (1 - 3), Moderate Pain (4 - 6), Severe Pain (7 - 10)  aluminum hydroxide/magnesium hydroxide/simethicone Suspension 30 milliLiter(s) Oral every 4 hours PRN Dyspepsia  melatonin 3 milliGRAM(s) Oral at bedtime PRN Insomnia  ondansetron Injectable 4 milliGRAM(s) IV Push every 8 hours PRN Nausea and/or Vomiting      Allergies  aspirin (Rash)  latex (Unknown)  penicillins (Unknown)      Review of Systems:   Constitutional:  No Fever, No Chills  ENT/Mouth:  No Hearing Changes,  No Difficulty Swallowing  Eyes:  No Eye Pain, No Vision Changes  Cardiovascular:  No Chest Pain, No Palpitations  Respiratory:  No Cough, No Dyspnea  Gastrointestinal:  As described in HPI  Musculoskeletal:  No Joint Swelling, No Back Pain  Skin:  No Skin Lesions, No Jaundice  Neuro:  No Syncope, No Dizziness  Heme/Lymph:  No Bruising, No Bleeding.          Physical Examination:  T(C): 36.1 (10-12-22 @ 07:34), Max: 36.8 (10-12-22 @ 00:30)  HR: 60 (10-12-22 @ 07:34) (60 - 89)  BP: 117/55 (10-12-22 @ 07:34) (107/58 - 137/60)  RR: 18 (10-12-22 @ 07:34) (16 - 18)  SpO2: 99% (10-12-22 @ 07:34) (95% - 99%)  Height (cm): 170.2 (10-11-22 @ 15:46)  Weight (kg): 107 (10-11-22 @ 15:46)      Constitutional: No acute distress.  Eyes:. Conjunctivae are clear, Sclera is non-icteric.  Ears Nose and Throat: The external ears are normal appearing,  Oral mucosa is pink and moist.  Respiratory:  No signs of respiratory distress. Lung sounds are clear bilaterally.  Cardiovascular:  S1 S2, Regular rate and rhythm.  GI: Abdomen is soft, symmetric, and non-tender without distention. There are no visible lesions. Bowel sounds are present and normoactive in all four quadrants. No masses, hepatomegaly, or splenomegaly are noted. Dark stool  Neuro: No Tremor, No involuntary movements  Skin: No rashes, No Jaundice.          Data: (reviewed by attending)                        7.7    2.79  )-----------( 93       ( 12 Oct 2022 07:05 )             24.2     Hgb Trend:  7.7  10-12-22 @ 07:05  5.5  10-11-22 @ 20:32      10-12    143  |  115<H>  |  54<H>  ----------------------------<  165<H>  5.4<H>   |  16<L>  |  2.1<H>    Ca    8.4      12 Oct 2022 07:05  Mg     2.4     10-12    TPro  5.5<L>  /  Alb  3.5  /  TBili  1.1  /  DBili  x   /  AST  12  /  ALT  5   /  AlkPhos  82  10-12    Liver panel trend:  TBili 1.1   /   AST 12   /   ALT 5   /   AlkP 82   /   Tptn 5.5   /   Alb 3.5    /   DBili --      10-12  TBili 0.4   /   AST 12   /   ALT <5   /   AlkP 80   /   Tptn 5.7   /   Alb 3.4    /   DBili --      10-11  TBili 0.7   /   AST 12   /   ALT <5   /   AlkP 75   /   Tptn 5.6   /   Alb 3.6    /   DBili --      10-03      PT/INR - ( 11 Oct 2022 20:32 )   PT: 11.40 sec;   INR: 1.00 ratio         PTT - ( 11 Oct 2022 20:32 )  PTT:32.5 sec        Radiology:(reviewed by attending)       Gastroenterology Consultation:    Patient is a 76y old  Female who presents with a chief complaint of anemia on outpatient labs (12 Oct 2022 11:20)      Admitted on: 10-11-22  HPI:  76 year old female patient with PMHx of Severe AS, Anemia Likely Secondary to obscure GI bleeding (from Duodenum and Gastric AVM per recent capsule endoscopy 02/2022 after presenting with melena), DARRIN ( follows with Dr Silver. Receives Transfusions q1-2 weeks and Retacrit 86606 units weekly), Hypertension, CKD4., Sciatica presents for symptomatic anemia and concern for GI related blood loss.   patient saw her hematologist few days ago, she was complaining of fatigue and mild shortness of breath on exertion.  she denied chest pain, fever, chills, abdominal pain, N/V, hematuria, hematochezia or melena.   she was sent to the ED by her hematologist after OP labs showed severe anemia.  She denied having abd pain. she denies NSAID use.       VITALS:   T(C): 36.6 (10-11-22 @ 15:46), Max: 36.6 (10-11-22 @ 15:46)  HR: 74 (10-11-22 @ 15:46) (74 - 74)  BP: 107/58 (10-11-22 @ 15:46) (107/58 - 107/58)  RR: 16 (10-11-22 @ 15:46) (16 - 16)  SpO2: 98% (10-11-22 @ 15:46) (98% - 98%)    in ED, vitals were WNL. labs were significant for a Hb od 5.5 and creat 2.3. patient received 1 unit PRBC and admitted to medicine for further investigations           PAST MEDICAL & SURGICAL HISTORY:  HTN (hypertension)  Heart murmur  Eczema  Anemia  iron infusions and PRBC transfusions      Aortic stenosis      Chronic kidney disease (CKD)      2019 novel coronavirus disease (COVID-19)  4/2020- REHAB admission      H/O appendicitis      H/O cholecystitis  s/p cholecystectomy          FAMILY HISTORY:  FH: kidney disease (Father)        Social History:  Tobacco: N  Alcohol: N  Drugs: N    Home Medications:  Metoprolol Tartrate 25 mg oral tablet: 1 tab(s) orally 2 times a day (12 Oct 2022 01:08)  pantoprazole 40 mg oral delayed release tablet: 1 tab(s) orally 2 times a day (12 Oct 2022 01:08)    MEDICATIONS  (STANDING):  metoprolol tartrate 25 milliGRAM(s) Oral two times a day  pantoprazole  Injectable 40 milliGRAM(s) IV Push two times a day  torsemide 20 milliGRAM(s) Oral daily    MEDICATIONS  (PRN):  acetaminophen     Tablet .. 650 milliGRAM(s) Oral every 6 hours PRN Temp greater or equal to 38C (100.4F), Mild Pain (1 - 3), Moderate Pain (4 - 6), Severe Pain (7 - 10)  aluminum hydroxide/magnesium hydroxide/simethicone Suspension 30 milliLiter(s) Oral every 4 hours PRN Dyspepsia  melatonin 3 milliGRAM(s) Oral at bedtime PRN Insomnia  ondansetron Injectable 4 milliGRAM(s) IV Push every 8 hours PRN Nausea and/or Vomiting      Allergies  aspirin (Rash)  latex (Unknown)  penicillins (Unknown)      Review of Systems:   Constitutional:  No Fever, No Chills  ENT/Mouth:  No Hearing Changes,  No Difficulty Swallowing  Eyes:  No Eye Pain, No Vision Changes  Cardiovascular:  No Chest Pain, No Palpitations  Respiratory:  No Cough, No Dyspnea  Gastrointestinal:  As described in HPI  Musculoskeletal:  No Joint Swelling, No Back Pain  Skin:  No Skin Lesions, No Jaundice  Neuro:  No Syncope, No Dizziness  Heme/Lymph:  No Bruising, No Bleeding.          Physical Examination:  T(C): 36.1 (10-12-22 @ 07:34), Max: 36.8 (10-12-22 @ 00:30)  HR: 60 (10-12-22 @ 07:34) (60 - 89)  BP: 117/55 (10-12-22 @ 07:34) (107/58 - 137/60)  RR: 18 (10-12-22 @ 07:34) (16 - 18)  SpO2: 99% (10-12-22 @ 07:34) (95% - 99%)  Height (cm): 170.2 (10-11-22 @ 15:46)  Weight (kg): 107 (10-11-22 @ 15:46)      Constitutional: No acute distress.  Eyes:. Conjunctivae are clear, Sclera is non-icteric.  Ears Nose and Throat: The external ears are normal appearing,  Oral mucosa is pink and moist.  Respiratory:  No signs of respiratory distress. Lung sounds are clear bilaterally.  Cardiovascular:  S1 S2, Regular rate and rhythm.  GI: Abdomen is soft, symmetric, and non-tender without distention. There are no visible lesions. Bowel sounds are present and normoactive in all four quadrants. obese.  Rectal: Dark stool  Neuro: No Tremor, No involuntary movements  Skin: No rashes, No Jaundice.          Data: (reviewed by attending)                        7.7    2.79  )-----------( 93       ( 12 Oct 2022 07:05 )             24.2     Hgb Trend:  7.7  10-12-22 @ 07:05  5.5  10-11-22 @ 20:32      10-12    143  |  115<H>  |  54<H>  ----------------------------<  165<H>  5.4<H>   |  16<L>  |  2.1<H>    Ca    8.4      12 Oct 2022 07:05  Mg     2.4     10-12    TPro  5.5<L>  /  Alb  3.5  /  TBili  1.1  /  DBili  x   /  AST  12  /  ALT  5   /  AlkPhos  82  10-12    Liver panel trend:  TBili 1.1   /   AST 12   /   ALT 5   /   AlkP 82   /   Tptn 5.5   /   Alb 3.5    /   DBili --      10-12  TBili 0.4   /   AST 12   /   ALT <5   /   AlkP 80   /   Tptn 5.7   /   Alb 3.4    /   DBili --      10-11  TBili 0.7   /   AST 12   /   ALT <5   /   AlkP 75   /   Tptn 5.6   /   Alb 3.6    /   DBili --      10-03      PT/INR - ( 11 Oct 2022 20:32 )   PT: 11.40 sec;   INR: 1.00 ratio         PTT - ( 11 Oct 2022 20:32 )  PTT:32.5 sec        Radiology:(reviewed by attending)  < from: CT Abdomen and Pelvis w/ IV Cont (09.06.22 @ 17:53) >  ACC: [id number] EXAM:  CT ABDOMEN AND PELVIS IC                          PROCEDURE DATE:  09/06/2022          INTERPRETATION:  CLINICAL STATEMENT: Right lower quadrant pain    TECHNIQUE: Contiguous axial CT images were obtained from the lower chest   to the pubic symphysis following administration of 100cc Optiray 320   intravenous contrast.  Oral contrast was not administered.  Reformatted   images in the coronal and sagittal planes were acquired.    COMPARISON CT: January 17, 2022    OTHER STUDIES USED FOR CORRELATION: PET CT August 9, 2022.      FINDINGS:    LOWER CHEST: Unremarkable.    HEPATOBILIARY: Unremarkable liver. Post cholecystectomy.    SPLEEN: Unremarkable.    PANCREAS: Unremarkable.    ADRENAL GLANDS: 1.8 cm left adrenal nodule, stable. The right adrenal   gland is unremarkable.    KIDNEYS: Enhance symmetrically without hydroureteronephrosis. Left renal   cysts measuring up to 1.5 cm, stable.    ABDOMINOPELVIC NODES: Unremarkable.    PELVIC ORGANS: Uterine fibroids.    PERITONEUM/MESENTERY/BOWEL: No bowel obstruction, pneumoperitoneum or   ascites. No CT evidence of acute appendicitis.    BONES/SOFT TISSUES: Degenerative changes of the spine with mild   compression deformity of superior T12 endplate, stable. Avascular   necrosis of the right femoral head.    OTHER: Atherosclerotic calcifications of the aorta and iliac vessels.      IMPRESSION: No acute intra-abdominal pathology.    Stable left adrenal gland (non-FDG avid in August 2022).    --- End of Report ---            DARYL LI MD; Attending Radiologist  This document has been electronically signed. Sep  6 2022  7:11PM    < end of copied text >

## 2022-10-12 NOTE — ED ADULT NURSE REASSESSMENT NOTE - NS ED NURSE REASSESS COMMENT FT1
attempted report to 3b for inpatient bed assignment. "tammy" states nurse is busy. instructed to call me back at [phone]

## 2022-10-12 NOTE — CONSULT NOTE ADULT - ASSESSMENT
76 year old female patient with PMHx of Severe AS, Anemia Likely Secondary to obscure GI bleeding (from Duodenum and Gastric AVM per recent capsule endoscopy 02/2022 after presenting with melena), DARRIN ( follows with Dr Silver. Receives Transfusions q1-2 weeks and Retacrit 32741 units weekly), Hypertension, CKD4., Sciatica presents for the anemia.      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 Paula  -US 4 mm CBD  -Capsule endoscopy (2022): positive for bleeding in the stomach and AVM in duodenum   - patient refused endoscopy on previous admissions due to cardiac risk      REC:  - liquid diet  - PPI IV BID  - give 1 URBC  - keep hgb>8  - active type and screen  - monitor CBC  - patient will need EGD/enteroscopy for GAVE and duodenal AVMs--> patient wants to talk to cardiology about the risk of procedure   - keep NPO after midnight if patient agreeable with EGD/enteroscopy  - hem/onc f/u     76 year old female patient with PMHx of Severe AS, Anemia Likely Secondary to obscure GI bleeding (from Duodenum and Gastric AVM per recent capsule endoscopy 02/2022 after presenting with melena), DARRIN ( follows with Dr Silver. Receives Transfusions q1-2 weeks and Retacrit 04495 units weekly), Hypertension, CKD4., Sciatica presents for the anemia.      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 Paula  -US 4 mm CBD  -Capsule endoscopy (2022): positive for bleeding in the stomach and AVM in duodenum   - patient refused endoscopy on previous admissions due to cardiac risk      REC:  - liquid diet  - PPI IV BID  - give 1 URBC  - keep hgb>8  - active type and screen  - monitor CBC  - Correct hyperkalemia  - patient will need EGD/enteroscopy for GAVE and duodenal AVMs--> patient wants to talk to cardiology about the risk of procedure   - keep NPO after midnight if patient agreeable with EGD/enteroscopy  - hem/onc f/u     76 year old female patient with PMHx of Severe AS, Anemia Likely Secondary to obscure GI bleeding (from Duodenum and Gastric AVM per recent capsule endoscopy 02/2022 after presenting with melena), DARRIN ( follows with Dr Silver. Receives Transfusions q1-2 weeks and Retacrit 46416 units weekly), Hypertension, CKD4., Sciatica presents for anemia and concern for GI related blood losses.    #Iron deficiency anemia likely due to slow GI-related blood loss from known GAVE  -vitals were WNL; HD stable  - labs were significant for a Hb od 5.5 s/p 2UPRBC--> 7.7  - OLEKSANDR dark stool  -Had EGD 10/2019: gastritis and colonoscopy 10/2019 good prep, pan colonic diverticulosis, hemorrhoids  -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 Paula  - 4 mm CBD  -Capsule endoscopy (2022): positive for bleeding in the stomach and AVM in duodenum   -patient refused endoscopy on previous admissions due to cardiac risk      REC:  -liquid diet  -PPI IV BID  -give 1 URBC  -keep hgb>8  -active type and screen  -monitor CBC  -Correct hyperkalemia  -patient will need EGD/enteroscopy for GAVE and duodenal AVMs--> patient wants to talk to cardiology about the risk of procedure  -keep NPO after midnight if patient agreeable with EGD/enteroscopy  -hem/onc f/u

## 2022-10-12 NOTE — ED ADULT NURSE REASSESSMENT NOTE - NS ED NURSE REASSESS COMMENT FT1
patient accompanied to 3b with all belongings including boots, walker, jacket. report given bedside to rn

## 2022-10-12 NOTE — CONSULT NOTE ADULT - ATTENDING COMMENTS
seen/examined w/ hemonc team; note reviewed; case discussed  prbc  iv venofer  GI consult appreciated

## 2022-10-12 NOTE — CONSULT NOTE ADULT - ASSESSMENT
* SUMMARY:    * Patient-based characteristics (Functional capacity)  Patient is able to achieve more than 4 MET (walk 4 blocks, climb 2 flights of stairs, etc...)          Y [] / N [x]    High-risk patient:   Recent (<30 days) or active MI          Y [] / N [X]                                    Unstable or severe angina          Y [] / N [X]                                    Decompensated heart failure, or worsening or new-onset heart failure          Y [] / N [X]                                    Severe valvular disease          Y [x] / N []                                    Significant arrhythmia (Tachy- or Bradyarrhythmia)          Y [] / N [X]    * Surgery/Procedure-based characteristics (Type of surgery)  - Low-risk procedure (outpatient procedure, elective, endoscopy, etc...)          Y [x] / N []  - Elevated or Moderate-risk procedure (Inpatient)          Y [] / N []  - High-risk procedure (urgent/emergent procedure, Intrathoracic, vascular, etc...)          Y [] / N []    * Revised Cardiac Risk Index (RCRI)  1- History of ischemic heart disease          Y [] / N [X]  2- History of congestive heart failure          Y [] / N [X]  3- History of stroke/TIA          Y [] / N [X]  4- History of insulin-dependent diabetes          Y [] / N [X]  5- Chronic kidney disease (Cr >2mg/dL)          Y [x] / N []  6- Undergoing suprainguinal vascular, intraperitoneal, or intrathoracic surgery          Y [] / N [X]    Class I risk (Zero factors) --> 3.9% (30-day risk of death, MI, or cardiac arrest)  Class II risk (One factor) --> 6% risk  Class III risk (Two factors) --> 10.1% risk  Class IV risk (Three factors or more) --> >15% risk    * IMPRESSION & RECOMMENDATIONS:  TTE 2/17/22: normal LVEF of 74%, severe aortic stenosis with mean gradient 49 mm Hg, valve area 0.8 cm2, mild MR    RCRI I  Mets < 4 (2-3)    She is high/increased risk for cardiac events given the severity of her aortic stenosis for a low risk procedure      - This consult serves only as a estuardo-operative cardiac risk stratification and evaluation to predict 30-days cardiac complications risk and mortality. The decision to proceed with the surgery/procedure is made by the performing physician and the patient -

## 2022-10-13 LAB
ALBUMIN SERPL ELPH-MCNC: 3.3 G/DL — LOW (ref 3.5–5.2)
ALBUMIN SERPL ELPH-MCNC: 3.6 G/DL — SIGNIFICANT CHANGE UP (ref 3.5–5.2)
ALP SERPL-CCNC: 73 U/L — SIGNIFICANT CHANGE UP (ref 30–115)
ALP SERPL-CCNC: 79 U/L — SIGNIFICANT CHANGE UP (ref 30–115)
ALT FLD-CCNC: <5 U/L — SIGNIFICANT CHANGE UP (ref 0–41)
ALT FLD-CCNC: <5 U/L — SIGNIFICANT CHANGE UP (ref 0–41)
ANION GAP SERPL CALC-SCNC: 10 MMOL/L — SIGNIFICANT CHANGE UP (ref 7–14)
ANION GAP SERPL CALC-SCNC: 11 MMOL/L — SIGNIFICANT CHANGE UP (ref 7–14)
ANION GAP SERPL CALC-SCNC: 9 MMOL/L — SIGNIFICANT CHANGE UP (ref 7–14)
APPEARANCE UR: CLEAR — SIGNIFICANT CHANGE UP
AST SERPL-CCNC: 14 U/L — SIGNIFICANT CHANGE UP (ref 0–41)
AST SERPL-CCNC: 14 U/L — SIGNIFICANT CHANGE UP (ref 0–41)
BACTERIA # UR AUTO: NEGATIVE — SIGNIFICANT CHANGE UP
BASOPHILS # BLD AUTO: 0 K/UL — SIGNIFICANT CHANGE UP (ref 0–0.2)
BASOPHILS NFR BLD AUTO: 0 % — SIGNIFICANT CHANGE UP (ref 0–1)
BILIRUB SERPL-MCNC: 0.7 MG/DL — SIGNIFICANT CHANGE UP (ref 0.2–1.2)
BILIRUB SERPL-MCNC: 0.9 MG/DL — SIGNIFICANT CHANGE UP (ref 0.2–1.2)
BILIRUB UR-MCNC: NEGATIVE — SIGNIFICANT CHANGE UP
BUN SERPL-MCNC: 59 MG/DL — HIGH (ref 10–20)
BUN SERPL-MCNC: 64 MG/DL — CRITICAL HIGH (ref 10–20)
BUN SERPL-MCNC: 65 MG/DL — CRITICAL HIGH (ref 10–20)
CALCIUM SERPL-MCNC: 8.6 MG/DL — SIGNIFICANT CHANGE UP (ref 8.4–10.4)
CALCIUM SERPL-MCNC: 8.6 MG/DL — SIGNIFICANT CHANGE UP (ref 8.4–10.5)
CALCIUM SERPL-MCNC: 9 MG/DL — SIGNIFICANT CHANGE UP (ref 8.4–10.5)
CHLORIDE SERPL-SCNC: 111 MMOL/L — HIGH (ref 98–110)
CHLORIDE SERPL-SCNC: 114 MMOL/L — HIGH (ref 98–110)
CHLORIDE SERPL-SCNC: 115 MMOL/L — HIGH (ref 98–110)
CO2 SERPL-SCNC: 16 MMOL/L — LOW (ref 17–32)
CO2 SERPL-SCNC: 18 MMOL/L — SIGNIFICANT CHANGE UP (ref 17–32)
CO2 SERPL-SCNC: 22 MMOL/L — SIGNIFICANT CHANGE UP (ref 17–32)
COLOR SPEC: SIGNIFICANT CHANGE UP
CREAT SERPL-MCNC: 2.3 MG/DL — HIGH (ref 0.7–1.5)
CREAT SERPL-MCNC: 2.5 MG/DL — HIGH (ref 0.7–1.5)
CREAT SERPL-MCNC: 2.5 MG/DL — HIGH (ref 0.7–1.5)
DIFF PNL FLD: NEGATIVE — SIGNIFICANT CHANGE UP
EGFR: 19 ML/MIN/1.73M2 — LOW
EGFR: 19 ML/MIN/1.73M2 — LOW
EGFR: 21 ML/MIN/1.73M2 — LOW
EOSINOPHIL # BLD AUTO: 0 K/UL — SIGNIFICANT CHANGE UP (ref 0–0.7)
EOSINOPHIL NFR BLD AUTO: 0 % — SIGNIFICANT CHANGE UP (ref 0–8)
EPI CELLS # UR: 4 /HPF — SIGNIFICANT CHANGE UP (ref 0–5)
GLUCOSE BLDC GLUCOMTR-MCNC: 122 MG/DL — HIGH (ref 70–99)
GLUCOSE SERPL-MCNC: 124 MG/DL — HIGH (ref 70–99)
GLUCOSE SERPL-MCNC: 130 MG/DL — HIGH (ref 70–99)
GLUCOSE SERPL-MCNC: 79 MG/DL — SIGNIFICANT CHANGE UP (ref 70–99)
GLUCOSE UR QL: NEGATIVE — SIGNIFICANT CHANGE UP
HCT VFR BLD CALC: 24.2 % — LOW (ref 37–47)
HCT VFR BLD CALC: 26.2 % — LOW (ref 37–47)
HGB BLD-MCNC: 7.7 G/DL — LOW (ref 12–16)
HGB BLD-MCNC: 8.2 G/DL — LOW (ref 12–16)
HYALINE CASTS # UR AUTO: 2 /LPF — SIGNIFICANT CHANGE UP (ref 0–7)
IMM GRANULOCYTES NFR BLD AUTO: 1 % — HIGH (ref 0.1–0.3)
KETONES UR-MCNC: NEGATIVE — SIGNIFICANT CHANGE UP
LEUKOCYTE ESTERASE UR-ACNC: ABNORMAL
LYMPHOCYTES # BLD AUTO: 0.45 K/UL — LOW (ref 1.2–3.4)
LYMPHOCYTES # BLD AUTO: 8.6 % — LOW (ref 20.5–51.1)
MAGNESIUM SERPL-MCNC: 2.3 MG/DL — SIGNIFICANT CHANGE UP (ref 1.8–2.4)
MCHC RBC-ENTMCNC: 27.6 PG — SIGNIFICANT CHANGE UP (ref 27–31)
MCHC RBC-ENTMCNC: 27.9 PG — SIGNIFICANT CHANGE UP (ref 27–31)
MCHC RBC-ENTMCNC: 31.3 G/DL — LOW (ref 32–37)
MCHC RBC-ENTMCNC: 31.8 G/DL — LOW (ref 32–37)
MCV RBC AUTO: 87.7 FL — SIGNIFICANT CHANGE UP (ref 81–99)
MCV RBC AUTO: 88.2 FL — SIGNIFICANT CHANGE UP (ref 81–99)
MONOCYTES # BLD AUTO: 0.08 K/UL — LOW (ref 0.1–0.6)
MONOCYTES NFR BLD AUTO: 1.5 % — LOW (ref 1.7–9.3)
NEUTROPHILS # BLD AUTO: 4.65 K/UL — SIGNIFICANT CHANGE UP (ref 1.4–6.5)
NEUTROPHILS NFR BLD AUTO: 88.9 % — HIGH (ref 42.2–75.2)
NITRITE UR-MCNC: NEGATIVE — SIGNIFICANT CHANGE UP
NRBC # BLD: 0 /100 WBCS — SIGNIFICANT CHANGE UP (ref 0–0)
NRBC # BLD: 0 /100 WBCS — SIGNIFICANT CHANGE UP (ref 0–0)
PH UR: 6 — SIGNIFICANT CHANGE UP (ref 5–8)
PLATELET # BLD AUTO: 103 K/UL — LOW (ref 130–400)
PLATELET # BLD AUTO: 91 K/UL — LOW (ref 130–400)
POTASSIUM SERPL-MCNC: 4.7 MMOL/L — SIGNIFICANT CHANGE UP (ref 3.5–5)
POTASSIUM SERPL-MCNC: 5.5 MMOL/L — HIGH (ref 3.5–5)
POTASSIUM SERPL-MCNC: 5.6 MMOL/L — HIGH (ref 3.5–5)
POTASSIUM SERPL-SCNC: 4.7 MMOL/L — SIGNIFICANT CHANGE UP (ref 3.5–5)
POTASSIUM SERPL-SCNC: 5.5 MMOL/L — HIGH (ref 3.5–5)
POTASSIUM SERPL-SCNC: 5.6 MMOL/L — HIGH (ref 3.5–5)
PROT SERPL-MCNC: 5.4 G/DL — LOW (ref 6–8)
PROT SERPL-MCNC: 5.7 G/DL — LOW (ref 6–8)
PROT UR-MCNC: NEGATIVE — SIGNIFICANT CHANGE UP
RBC # BLD: 2.76 M/UL — LOW (ref 4.2–5.4)
RBC # BLD: 2.97 M/UL — LOW (ref 4.2–5.4)
RBC # FLD: 16.7 % — HIGH (ref 11.5–14.5)
RBC # FLD: 17.2 % — HIGH (ref 11.5–14.5)
RBC CASTS # UR COMP ASSIST: 1 /HPF — SIGNIFICANT CHANGE UP (ref 0–4)
SODIUM SERPL-SCNC: 140 MMOL/L — SIGNIFICANT CHANGE UP (ref 135–146)
SODIUM SERPL-SCNC: 142 MMOL/L — SIGNIFICANT CHANGE UP (ref 135–146)
SODIUM SERPL-SCNC: 144 MMOL/L — SIGNIFICANT CHANGE UP (ref 135–146)
SP GR SPEC: 1.01 — SIGNIFICANT CHANGE UP (ref 1.01–1.03)
UROBILINOGEN FLD QL: SIGNIFICANT CHANGE UP
WBC # BLD: 4.93 K/UL — SIGNIFICANT CHANGE UP (ref 4.8–10.8)
WBC # BLD: 5.23 K/UL — SIGNIFICANT CHANGE UP (ref 4.8–10.8)
WBC # FLD AUTO: 4.93 K/UL — SIGNIFICANT CHANGE UP (ref 4.8–10.8)
WBC # FLD AUTO: 5.23 K/UL — SIGNIFICANT CHANGE UP (ref 4.8–10.8)
WBC UR QL: 11 /HPF — HIGH (ref 0–5)

## 2022-10-13 PROCEDURE — 99233 SBSQ HOSP IP/OBS HIGH 50: CPT

## 2022-10-13 RX ORDER — DEXTROSE 50 % IN WATER 50 %
50 SYRINGE (ML) INTRAVENOUS ONCE
Refills: 0 | Status: COMPLETED | OUTPATIENT
Start: 2022-10-13 | End: 2022-10-13

## 2022-10-13 RX ORDER — IRON SUCROSE 20 MG/ML
200 INJECTION, SOLUTION INTRAVENOUS ONCE
Refills: 0 | Status: COMPLETED | OUTPATIENT
Start: 2022-10-13 | End: 2022-10-13

## 2022-10-13 RX ORDER — DIPHENHYDRAMINE HCL 50 MG
50 CAPSULE ORAL ONCE
Refills: 0 | Status: COMPLETED | OUTPATIENT
Start: 2022-10-13 | End: 2022-10-13

## 2022-10-13 RX ORDER — SODIUM ZIRCONIUM CYCLOSILICATE 10 G/10G
10 POWDER, FOR SUSPENSION ORAL ONCE
Refills: 0 | Status: COMPLETED | OUTPATIENT
Start: 2022-10-13 | End: 2022-10-13

## 2022-10-13 RX ORDER — FUROSEMIDE 40 MG
40 TABLET ORAL ONCE
Refills: 0 | Status: COMPLETED | OUTPATIENT
Start: 2022-10-13 | End: 2022-10-13

## 2022-10-13 RX ORDER — INSULIN HUMAN 100 [IU]/ML
10 INJECTION, SOLUTION SUBCUTANEOUS ONCE
Refills: 0 | Status: COMPLETED | OUTPATIENT
Start: 2022-10-13 | End: 2022-10-13

## 2022-10-13 RX ORDER — IRON SUCROSE 20 MG/ML
200 INJECTION, SOLUTION INTRAVENOUS ONCE
Refills: 0 | Status: DISCONTINUED | OUTPATIENT
Start: 2022-10-13 | End: 2022-10-13

## 2022-10-13 RX ADMIN — Medication 125 MILLIGRAM(S): at 15:46

## 2022-10-13 RX ADMIN — PANTOPRAZOLE SODIUM 40 MILLIGRAM(S): 20 TABLET, DELAYED RELEASE ORAL at 18:12

## 2022-10-13 RX ADMIN — Medication 20 MILLIGRAM(S): at 05:31

## 2022-10-13 RX ADMIN — PANTOPRAZOLE SODIUM 40 MILLIGRAM(S): 20 TABLET, DELAYED RELEASE ORAL at 05:31

## 2022-10-13 RX ADMIN — SODIUM ZIRCONIUM CYCLOSILICATE 10 GRAM(S): 10 POWDER, FOR SUSPENSION ORAL at 09:39

## 2022-10-13 RX ADMIN — INSULIN HUMAN 10 UNIT(S): 100 INJECTION, SOLUTION SUBCUTANEOUS at 11:10

## 2022-10-13 RX ADMIN — IRON SUCROSE 110 MILLIGRAM(S): 20 INJECTION, SOLUTION INTRAVENOUS at 21:33

## 2022-10-13 RX ADMIN — Medication 25 MILLIGRAM(S): at 05:31

## 2022-10-13 RX ADMIN — Medication 50 MILLILITER(S): at 11:11

## 2022-10-13 RX ADMIN — Medication 50 MILLIGRAM(S): at 15:44

## 2022-10-13 NOTE — PROGRESS NOTE ADULT - ASSESSMENT
76 year old woman with PMHx of Severe AS, Anemia Likely Secondary to obscure GI bleeding (from Duodenum and Gastric AVM per recent capsule endoscopy 02/2022 after presenting with melena), DARRNI (follows with Dr Gaston). Receives Transfusions q1-2 weeks and Retacrit 90281 units weekly), Hypertension, CKD4., Sciatica presents for anemia.    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)  cbc q24  Active Type and Screen  Cont IV protonix q12  f/u GI re EGD and c-scope  Cardio eval noted: mod risk; rec cardiac anesth during procedure  h/o noted: venofer    # Hypertension  c/w lopressor 25mg BID    # H/O severe AS  TTE 02/22 -> EF 74%, severe AS  followed by Dr Paula  TAVR might be considered outpatient - but would need bleeding controlled first as there is a brief need for a/c post-TAVR    # CKD 4  base Cr low-mid 2s  stable  Outpatient nephrology follow up  c/w home torsemide dose    # DVT PPX: SCD    Dispo: f/u GI re scopes; 1u prbc; cardiac anesth;   further recs after scope  eventually, pt will go home w/ no needs 76 year old woman with PMHx of Severe AS, Anemia Likely Secondary to obscure GI bleeding (from Duodenum and Gastric AVM per recent capsule endoscopy 02/2022 after presenting with melena), DARRIN (follows with Dr Gaston). Receives Transfusions q1-2 weeks and Retacrit 33591 units weekly), Hypertension, CKD4., Sciatica presents for anemia.    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)  cbc q24  Active Type and Screen  Cont IV protonix q12  f/u GI re EGD and c-scope  Cardio eval noted: mod risk; rec cardiac anesth during procedure  h/o noted: venofer    # Hypertension  c/w lopressor 25mg BID    # H/O severe AS  TTE 02/22 -> EF 74%, severe AS  followed by Dr Paula  TAVR might be considered outpatient - but would need bleeding controlled first as there is a brief need for a/c post-TAVR    # CKD 4  base Cr low-mid 2s  stable  Outpatient nephrology follow up  c/w home torsemide dose    # BMI 36.9 = obesity  wt loss advised    # DVT PPX: SCD    Dispo: f/u GI re scopes; 1u prbc; cardiac anesth;   further recs after scope  eventually, pt will go home w/ no needs

## 2022-10-13 NOTE — PROGRESS NOTE ADULT - ASSESSMENT
76 year old female patient with PMHx of Severe AS, Anemia Likely Secondary to obscure GI bleeding (from Duodenum and Gastric AVM per recent capsule endoscopy 02/2022 after presenting with melena), DARRIN ( follows with Dr Silver. Receives Transfusions q1-2 weeks and Retacrit 32678 units weekly), Hypertension, CKD4., Sciatica presents for    #Acute on Chronic Drop in Hemoglobin in Setting of Iron Deficiency Anemia  #Obscure GI bleed   #Duodenal and Gastric AVM Bleeding (seenon Capsule endoscopy)  # Hgb 5.5 s/p 4U PRBC =>7.7  - lethargy and mild SOB  - Hemodynamically stable in ED  - Transfuse to target Hgb >8   - Trend CBC closely and transfuse as needed  - Active Type and Screen  - 2 Large IV Bores  - c/w IV Protonix 40mg BID   - clear liquid diet  - Receives Transfusions q1-2 weeks and Retacrit 46127 units weekly  - Recent Admission in 02/2022 for melena: s/p capsule endoscopy showing bleeding from Duodenum and Gastric AVM   - Moderate Diverticulosis and Grade II Internal Hemorrhoids on colonoscopy 10/22/2019  - Erosive Gastritis on EGD 01/09/2020  - Off DVT prophylaxis  - Monitor BM  - GI eval: EGD/enteroscopy for GAVE and duodenal AVMs  - will most likely need cardio eval (Dr Paula) if any procedure planned by GI team      #Hypertension  - Home lopressor 25mg BID  - Monitor BP and avoid hypotension in setting of severe AS  - Resume home anti HTN     #H/O severe AS  - TTE 02/22 -> EF 74%  - severe AS  - followed by Dr Paula  - as per cardio last note -> Any Valve procedure either surgical or TAVR would require correction of the patient's anemia and optimization of her Hg/HCT to a tolerable level to proceed   - as per hem onc during last admission -> If hemoglobin of 10 is needed and patient continues to bleed it may not be possible to achieve this goal for the TAVR, thus we can administer a unit a day as long as the cardiology team feels patient will be able to tolerate this order to achieve the goal for her procedure      #CKD 4  - creat 2.3 Baseline Cr 1.7-2.3 2022  - Trend CMP and P daily  - Outpatient nephrology follow up  - lasix IV PRN and after PRBC transfusion      #MISC  - DVT Prophylaxis: off  - GI Prophylaxis Protonix  - Diet: Clear liquid  - Code Status: Full

## 2022-10-13 NOTE — PRE-ANESTHESIA EVALUATION ADULT - NSANTHDISPORD_GEN_ALL_CORE
SUBJECTIVE:      Chief Complaint: initial visit denies chest pain has CARRENO questionable murmur       HPI: Jennifer Hudson is a 86 y.o. female with hx hypertension, HLD, mild aortic stenosis, CKD, urinary incontinence, age-related macular degeneration, and osteoporosis who presents to Cardiology clinic for follow up. LV was in 2019. She feels very well today, and has no complaints. She states she remains active and is able to perform her ADLs as well as do household chores without experiencing chest pain or shortness of breath. She walks from her door to her gate multiple times everyday for exercise without symptoms. She also denies palpitations, orthopnea, PND, or syncope. She completed an echocardiogram in August 2019 which revealed left ventricular ejection fraction of approximately 70%, diastolic dysfunction, mild aortic stenosis, and mild mitral regurgitation. She reports compliance with her medications.      Past Medical History:   has no past medical history on file.     Past Surgical History:   has a past surgical history that includes Abdominal surgery (1958); Abdominoplasty (1974); Appendectomy (1943); Reduction of both breasts (1974); Bladder surgery; Oophorectomy; Laparoscopy; and Cataract extraction w/  intraocular lens implant.     Family History:  family history includes No Known Problems in her father and mother.     Social History:   reports that she has never smoked. She has never used smokeless tobacco. She reports that she does not currently use alcohol. She reports that she does not use drugs.       Allergies:  is allergic to codeine.     Home Medications:  Current Outpatient Medications   Medication Instructions    ibandronate (BONIVA) 150 mg, Oral, Every 30 days    multivit-min/iron/folic/ukr389 (HAIR, SKIN AND NAILS ADVANCED ORAL) Oral    olopatadine (PATANOL) 0.1 % ophthalmic solution   See Instructions, INSTILL 1 DROP INTO EACH EYE TWICE DAILY FOR 30 DAYS, # 10 mL, 0 Refill(s), Pharmacy:  "[id number], 155, cm, Height/Length Dosing, 12/17/21 10:02:00 CST, 77, kg, Weight Dosing, 12/17/21 10:02:00 CST    pravastatin (PRAVACHOL) 20 mg, Oral, Daily    tolterodine (DETROL LA) 4 mg, Oral, Daily    vit C/E/cuperic/zinc/lutein (PRESERVISION LUTEIN ORAL) Oral        ROS:  Constitutional: no fever, fatigue, weakness  Eye: no vision loss, eye redness, drainage, or pain  ENMT: no sore throat, ear pain, sinus pain/congestion, nasal congestion/drainage  Respiratory: no cough, no wheezing, no shortness of breath  Cardiovascular: no chest pain, no palpitations, no edema  Gastrointestinal: no nausea, vomiting, or diarrhea. No abdominal pain  Genitourinary: no dysuria, no urinary frequency or urgency, no hematuria  Hema/Lymph: no abnormal bruising or bleeding  Endocrine: no heat or cold intolerance, no excessive thirst or excessive urination  Musculoskeletal: no muscle or joint pain, no joint swelling  Integumentary: no skin rash or abnormal lesion  Neurologic: no headache, no dizziness, no weakness or numbness    OBJECTIVE:     Vital signs:   /83 (BP Location: Left arm, Patient Position: Sitting, BP Method: Medium (Automatic))   Pulse 92   Temp 98.1 °F (36.7 °C) (Oral)   Resp 20   Ht 5' 1.02" (1.55 m)   Wt 77.2 kg (170 lb 3.2 oz)   SpO2 95%   BMI 32.13 kg/m²      Physical Examination:  Vital signs and nursing notes reviewed.  Constitutional: Patient is in NAD. Awake and alert.   Head: Atraumatic. Normocephalic.  Eyes: Conjunctivae nl. No scleral icterus.  ENT: Mucous membranes are moist. Oropharynx is clear.  Neck: Supple. Full ROM.   Cardiovascular: Regular rate and rhythm. 2/6 systolic murmur, rubs, or gallops. Distal pulses are 2+ and symmetric.  Pulmonary/Chest: No respiratory distress. Clear to auscultation bilaterally. No wheezing, rales, or rhonchi.  Abdominal: Soft. Non-distended. No TTP. No rebound, guarding, or rigidity.   Musculoskeletal: Moves all extremities. No edema.   Skin: Warm " PACU and dry.  Neurological: Awake and alert. No acute focal neurological deficits are appreciated.        Cardiac Hx:     Echo 8/1/2019:           ASSESSMENT & PLAN:     Assessment and Plan:    Mild aortic stenosis   - Echo 8/2019 revealed EF 70% with mild aortic stenosis (peak velocity 2.31 m/sec; aortic valve area 1.4 cm2)  - Ordering repeat Echo today   - EKG today revealed NSR  - Follow up in Cardiology Clinic in 6 months.     Hyperlipidemia  - Continue Pravastatin 20 mg daily           Sofia Schmitz MD  [facility], [facility]      Attending addendum to follow

## 2022-10-13 NOTE — PROGRESS NOTE ADULT - ASSESSMENT
76 year old female patient with PMHx of Severe AS, Anemia Likely Secondary to obscure GI bleeding (from Duodenum and Gastric AVM per recent capsule endoscopy 02/2022 after presenting with melena), ADRRIN ( follows with Dr Silver. Receives Transfusions q1-2 weeks and Retacrit 87670 units weekly), Hypertension, CKD4., Sciatica presents for anemia and concern for GI related blood losses.    #Iron deficiency anemia likely due to slow GI-related blood loss from known GAVE  -vitals were WNL; HD stable  - labs were significant for a Hb od 5.5 s/p 2UPRBC--> 7.7 s/p 1 UPRBC--> 8.2--> 7.7   -No BM since admission  -Had EGD 10/2019: gastritis and colonoscopy 10/2019 good prep, pan colonic diverticulosis, hemorrhoids  -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 Paula  - 4 mm CBD  -Capsule endoscopy (2022): positive for bleeding in the stomach and AVM in duodenum   -patient refused endoscopy on previous admissions due to cardiac risk  - Was planned for EGD and enteroscopy today but procedure postponed due to hyperkalemia  - appreciate cardiology eval--> elevated risk and suggested cardiac anesthesia    REC:  -liquid diet  -PPI IV BID  -give 1 URBC  -keep hgb>8  -active type and screen  -monitor CBC  -Correct hyperkalemia  - EGD/enteroscopy tomorrow  - NPO after midnight  -hem/onc f/u

## 2022-10-14 ENCOUNTER — TRANSCRIPTION ENCOUNTER (OUTPATIENT)
Age: 77
End: 2022-10-14

## 2022-10-14 ENCOUNTER — RESULT REVIEW (OUTPATIENT)
Age: 77
End: 2022-10-14

## 2022-10-14 LAB
ALBUMIN SERPL ELPH-MCNC: 3.4 G/DL — LOW (ref 3.5–5.2)
ALP SERPL-CCNC: 73 U/L — SIGNIFICANT CHANGE UP (ref 30–115)
ALT FLD-CCNC: 6 U/L — SIGNIFICANT CHANGE UP (ref 0–41)
ANION GAP SERPL CALC-SCNC: 12 MMOL/L — SIGNIFICANT CHANGE UP (ref 7–14)
AST SERPL-CCNC: 15 U/L — SIGNIFICANT CHANGE UP (ref 0–41)
BILIRUB SERPL-MCNC: 0.8 MG/DL — SIGNIFICANT CHANGE UP (ref 0.2–1.2)
BLD GP AB SCN SERPL QL: SIGNIFICANT CHANGE UP
BUN SERPL-MCNC: 74 MG/DL — CRITICAL HIGH (ref 10–20)
CALCIUM SERPL-MCNC: 8.4 MG/DL — SIGNIFICANT CHANGE UP (ref 8.4–10.5)
CHLORIDE SERPL-SCNC: 110 MMOL/L — SIGNIFICANT CHANGE UP (ref 98–110)
CO2 SERPL-SCNC: 17 MMOL/L — SIGNIFICANT CHANGE UP (ref 17–32)
CREAT SERPL-MCNC: 2.8 MG/DL — HIGH (ref 0.7–1.5)
DIR ANTIGLOB POLYSPECIFIC INTERPRETATION: SIGNIFICANT CHANGE UP
EGFR: 17 ML/MIN/1.73M2 — LOW
FERRITIN SERPL-MCNC: 194 NG/ML — HIGH (ref 15–150)
FOLATE SERPL-MCNC: 6.2 NG/ML — SIGNIFICANT CHANGE UP
GLUCOSE SERPL-MCNC: 105 MG/DL — HIGH (ref 70–99)
HAPTOGLOB SERPL-MCNC: 89 MG/DL — SIGNIFICANT CHANGE UP (ref 34–200)
HCT VFR BLD CALC: 28.3 % — LOW (ref 37–47)
HCT VFR BLD CALC: 29 % — LOW (ref 37–47)
HGB BLD-MCNC: 8.8 G/DL — LOW (ref 12–16)
HGB BLD-MCNC: 9.2 G/DL — LOW (ref 12–16)
IRON SATN MFR SERPL: 310 UG/DL — HIGH (ref 35–150)
LDH SERPL L TO P-CCNC: 164 — SIGNIFICANT CHANGE UP (ref 50–242)
MAGNESIUM SERPL-MCNC: 2.4 MG/DL — SIGNIFICANT CHANGE UP (ref 1.8–2.4)
MCHC RBC-ENTMCNC: 28 PG — SIGNIFICANT CHANGE UP (ref 27–31)
MCHC RBC-ENTMCNC: 28.8 PG — SIGNIFICANT CHANGE UP (ref 27–31)
MCHC RBC-ENTMCNC: 31.1 G/DL — LOW (ref 32–37)
MCHC RBC-ENTMCNC: 31.7 G/DL — LOW (ref 32–37)
MCV RBC AUTO: 90.1 FL — SIGNIFICANT CHANGE UP (ref 81–99)
MCV RBC AUTO: 90.9 FL — SIGNIFICANT CHANGE UP (ref 81–99)
NRBC # BLD: 0 /100 WBCS — SIGNIFICANT CHANGE UP (ref 0–0)
NRBC # BLD: 0 /100 WBCS — SIGNIFICANT CHANGE UP (ref 0–0)
PLATELET # BLD AUTO: 92 K/UL — LOW (ref 130–400)
PLATELET # BLD AUTO: 98 K/UL — LOW (ref 130–400)
POTASSIUM SERPL-MCNC: 5.2 MMOL/L — HIGH (ref 3.5–5)
POTASSIUM SERPL-SCNC: 5.2 MMOL/L — HIGH (ref 3.5–5)
PROT SERPL-MCNC: 5.6 G/DL — LOW (ref 6–8)
RBC # BLD: 3.14 M/UL — LOW (ref 4.2–5.4)
RBC # BLD: 3.19 M/UL — LOW (ref 4.2–5.4)
RBC # FLD: 16.9 % — HIGH (ref 11.5–14.5)
RBC # FLD: 17.2 % — HIGH (ref 11.5–14.5)
SODIUM SERPL-SCNC: 139 MMOL/L — SIGNIFICANT CHANGE UP (ref 135–146)
TIBC SERPL-MCNC: <326 UG/DL — SIGNIFICANT CHANGE UP (ref 220–430)
TRANSFERRIN SERPL-MCNC: 245 MG/DL — SIGNIFICANT CHANGE UP (ref 200–360)
UIBC SERPL-MCNC: <16 UG/DL — LOW (ref 110–370)
VIT B12 SERPL-MCNC: 288 PG/ML — SIGNIFICANT CHANGE UP (ref 232–1245)
WBC # BLD: 4.76 K/UL — LOW (ref 4.8–10.8)
WBC # BLD: 5.34 K/UL — SIGNIFICANT CHANGE UP (ref 4.8–10.8)
WBC # FLD AUTO: 4.76 K/UL — LOW (ref 4.8–10.8)
WBC # FLD AUTO: 5.34 K/UL — SIGNIFICANT CHANGE UP (ref 4.8–10.8)

## 2022-10-14 PROCEDURE — 88312 SPECIAL STAINS GROUP 1: CPT | Mod: 26

## 2022-10-14 PROCEDURE — 44369 SMALL BOWEL ENDOSCOPY: CPT

## 2022-10-14 PROCEDURE — 88305 TISSUE EXAM BY PATHOLOGIST: CPT | Mod: 26

## 2022-10-14 PROCEDURE — 99233 SBSQ HOSP IP/OBS HIGH 50: CPT

## 2022-10-14 RX ORDER — SODIUM BICARBONATE 1 MEQ/ML
325 SYRINGE (ML) INTRAVENOUS EVERY 12 HOURS
Refills: 0 | Status: DISCONTINUED | OUTPATIENT
Start: 2022-10-14 | End: 2022-10-16

## 2022-10-14 RX ORDER — METOPROLOL TARTRATE 50 MG
1 TABLET ORAL
Qty: 0 | Refills: 0 | DISCHARGE

## 2022-10-14 RX ORDER — PANTOPRAZOLE SODIUM 20 MG/1
40 TABLET, DELAYED RELEASE ORAL
Qty: 0 | Refills: 0 | DISCHARGE
Start: 2022-10-14

## 2022-10-14 RX ORDER — DIPHENHYDRAMINE HCL 50 MG
25 CAPSULE ORAL EVERY 6 HOURS
Refills: 0 | Status: DISCONTINUED | OUTPATIENT
Start: 2022-10-14 | End: 2022-10-16

## 2022-10-14 RX ORDER — METOPROLOL TARTRATE 50 MG
1 TABLET ORAL
Qty: 0 | Refills: 0 | DISCHARGE
Start: 2022-10-14

## 2022-10-14 RX ADMIN — Medication 25 MILLIGRAM(S): at 05:48

## 2022-10-14 RX ADMIN — Medication 20 MILLIGRAM(S): at 05:48

## 2022-10-14 RX ADMIN — Medication 25 MILLIGRAM(S): at 22:15

## 2022-10-14 RX ADMIN — PANTOPRAZOLE SODIUM 40 MILLIGRAM(S): 20 TABLET, DELAYED RELEASE ORAL at 05:47

## 2022-10-14 RX ADMIN — Medication 3 MILLIGRAM(S): at 22:16

## 2022-10-14 NOTE — PHYSICAL THERAPY INITIAL EVALUATION ADULT - GENERAL OBSERVATIONS, REHAB EVAL
Pt encountered in semi-dimas position in bed A & O x 4 in NAD, no c/o pain and agreeable with PT. Pt requires supervision in bed mobility, transfer and ambulation 150 using RW. Pt negotiated 3 steps CGA with both hands holding on 1rail(Rt HR up/Lt HR down) step-to pattern. Pt tolerated therapy session, recommend DC home with RW and home PT.
Pt encountered long sitting position in stretcher, declined for PT stating, "I am waiting for the Cardiologist, I don't want to PT now, please come back later." Despite of education on importance of OOB activities Pt continue to decline for PT. Will f/u once pt is agreeable for b/s PT.

## 2022-10-14 NOTE — DISCHARGE NOTE PROVIDER - PROVIDER TOKENS
PROVIDER:[TOKEN:[[id number]],FOLLOWUP:[2 weeks],ESTABLISHEDPATIENT:[T]],PROVIDER:[TOKEN:[[id number]],FOLLOWUP:[2 weeks],ESTABLISHEDPATIENT:[T]] PROVIDER:[TOKEN:[[id number]],FOLLOWUP:[2 weeks],ESTABLISHEDPATIENT:[T]],PROVIDER:[TOKEN:[[id number]],FOLLOWUP:[1-3 days],ESTABLISHEDPATIENT:[T]],PROVIDER:[TOKEN:[[id number]],FOLLOWUP:[1-3 days]]

## 2022-10-14 NOTE — DISCHARGE NOTE NURSING/CASE MANAGEMENT/SOCIAL WORK - NSDCPEFALRISK_GEN_ALL_CORE
For information on Fall & Injury Prevention, visit: [email or web address] OR  [email or web address] OR  https://www.cdc.gov/steadi/patient.html

## 2022-10-14 NOTE — PROGRESS NOTE ADULT - ASSESSMENT
76 year old woman with PMHx of Severe AS, Anemia Likely Secondary to obscure GI bleeding (from Duodenum and Gastric AVM per recent capsule endoscopy 02/2022 after presenting with melena), DARRIN (follows with Dr Gaston). Receives Transfusions q1-2 weeks and Retacrit 27816 units weekly), Hypertension, CKD4., Sciatica presents for anemia.    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 as well, which should be investigated)  INR/PTT nl  cbc q24  Cont IV protonix q12 - make PO sara and keep q12  diet: full liquids tonight and adv sara   GI: s/p EGD 10/14 (see GI note): antral GAVE and multiple AVMs found (was scoped to jejunum) and cauterized   Cardio eval noted: mod risk; rec cardiac anesth during procedure  h/o noted: venofer  H/O wanted to rpt capsule endo - GI will decide sara (w/ pt) to do this inpt on MON or send pt home and do as outpt    # Hypertension  c/w lopressor 25mg BID    # H/O severe AS  TTE 02/22 -> EF 74%, severe AS  followed by Dr Paula  TAVR might be considered outpatient - but would need bleeding controlled first as there is a brief need for a/c post-TAVR    # CKD 4  base Cr mid 2s  stable  Outpatient nephrology follow up  c/w home torsemide dose 20mg q24 upon d/c    # BMI 36.9 = obesity  wt loss advised    # DVT PPX: SCD    Dispo: f/u GI re timing of capsule endo; cbc sara; full liquids tonight;   eventually, pt will go home w/ no needs - might be sara vs Mon/Tues (to d/w GI)

## 2022-10-14 NOTE — DISCHARGE NOTE PROVIDER - NSDCMRMEDTOKEN_GEN_ALL_CORE_FT
metoprolol tartrate 25 mg oral tablet: 1 tab(s) orally 2 times a day  pantoprazole 40 mg intravenous injection: 40 milligram(s) intravenous 2 times a day  torsemide 20 mg oral tablet: 1 tab(s) orally once a day   Lokelma 5 g oral powder for reconstitution: 10 gram(s) orally once a day   metoprolol tartrate 25 mg oral tablet: 1 tab(s) orally 2 times a day  pantoprazole 40 mg oral delayed release tablet: 1 tab(s) orally 2 times a day  sodium bicarbonate 650 mg oral tablet: 1 tab(s) orally 3 times a day

## 2022-10-14 NOTE — DISCHARGE NOTE PROVIDER - NSDCFUSCHEDAPPT_GEN_ALL_CORE_FT
[facility]  [facility] [address]   Scheduled Appointment: 10/17/2022    Liu Gaston  [facility]  [facility] [address]  Scheduled Appointment: 10/24/2022    [facility]  [facility] [address]   Scheduled Appointment: 10/24/2022    [facility]  [facility] [address]   Scheduled Appointment: 10/31/2022

## 2022-10-14 NOTE — DISCHARGE NOTE PROVIDER - CARE PROVIDERS DIRECT ADDRESSES
[email or web address]

## 2022-10-14 NOTE — PHYSICAL THERAPY INITIAL EVALUATION ADULT - PERTINENT HX OF CURRENT PROBLEM, REHAB EVAL
76 year old woman with PMHx of Severe AS, Anemia Likely Secondary to obscure GI bleeding (from Duodenum and Gastric AVM per recent capsule endoscopy 02/2022 after presenting with melena), DARRIN (follows with Dr Gasotn). Receives Transfusions q1-2 weeks and Retacrit 82516 units weekly), Hypertension, CKD4., Sciatica presents for anemia.

## 2022-10-14 NOTE — CHART NOTE - NSCHARTNOTEFT_GEN_A_CORE
s/p enteroscopy     impression:    Esophagus	               Mucosa	Normal mucosa was noted in the whole esophagus.    Stomach	              	A few small bleeding diffuse angioectasias were seen in the antrum. The lesions were distributed in a watermelon-stomach pattern. Hemostasis was performed successfully at the antrum.                                               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 APC applied to bleeding sites with excellent hemostacis. APC applied to antrum at multiple locations with success.    Duodenum	               Multiple small AVMs were noted in duodenal bulb and second part of duodenum. APCs were applied for the purpose of hemostatsis.                                             PCF scope was used for enteroscopy. Proximal jejunum examined. No blood was noted in jejunum. 2 small non-bleeding AVMs were noted in proximal jejunum. APCs were applied for the                                            purpose of hemostasis.        Rec:  Full liquid diet tonight and may advance the diet tomorrow if no signs of GI bleed  Protonix 40 mg twice a day  Await pathology results    Will need repeat capsule endoscopy which can be done as inpatient or outpatient  If plan for inpatient capsule endoscopy--> liquid diet on Sunday, 2 L golytely start at 4pm on Sunday night and NPO after midnight for capsule endoscopy on Monday

## 2022-10-14 NOTE — DISCHARGE NOTE PROVIDER - CARE PROVIDER_API CALL
Gildardo Bill  [address]  [address] [place] [address]  Phone: [phone]  Fax: [phone]  Established Patient  Follow Up Time: 2 weeks    Liu Gaston)  Internal Medicine; Medical Oncology  [address] [place] [address]  Phone: [phone]  Fax: [phone]  Established Patient  Follow Up Time: 2 weeks   Gildardo Bill  [address]  [address] [place] [address]  Phone: [phone]  Fax: [phone]  Established Patient  Follow Up Time: 2 weeks    Liu Gaston)  Internal Medicine; Medical Oncology  [address] [place] [address]  Phone: [phone]  Fax: [phone]  Established Patient  Follow Up Time: 1-3 days    Jimmie Donald)  Internal Medicine; Nephrology  [address] [place] [address]  Phone: [phone]  Fax: [phone]  Follow Up Time: 1-3 days

## 2022-10-14 NOTE — DISCHARGE NOTE PROVIDER - NSDCCPCAREPLAN_GEN_ALL_CORE_FT
PRINCIPAL DISCHARGE DIAGNOSIS  Diagnosis: Anemia  Assessment and Plan of Treatment: Anemia  Anemia is a condition in which the concentration of red blood cells or hemoglobin in the blood is below normal. Hemoglobin is a substance in red blood cells that carries oxygen to the tissues of the body. Anemia results in not enough oxygen reaching these tissues which can cause symptoms such as weakness, dizziness/lightheadedness, shortness of breath, chest pain, paleness, or nausea. The cause of your anemia may or may not be determined immediately. If your hemoglobin was dangerously low, you may have received a blood transfusion. Usually reactions to transfusions occur immediately but monitor yourself for any fevers, rash, or shortness of breath.  SEEK IMMEDIATE MEDICAL CARE IF YOU HAVE ANY OF THE FOLLOWING SYMPTOMS: extreme weakness/chest pain/shortness of breath, black or bloody stools, vomiting blood, fainting, fever, or any signs of dehydration.       PRINCIPAL DISCHARGE DIAGNOSIS  Diagnosis: Anemia  Assessment and Plan of Treatment: Patient underwent push enteroscopy.   Stomach showed a few small bleeding diffuse angioectasias. The lesions were distributed in a watermelon-stomach pattern.   Multiple small AVMs were noted in duodenal bulb and second part of duodenum. 2 small non-bleeding AVMs were noted in proximal jejunum.    Recommendations:   Protonix 40 mg twice a day  Will need repeat capsule endoscopy      SECONDARY DISCHARGE DIAGNOSES  Diagnosis: Stage 4 chronic kidney disease  Assessment and Plan of Treatment: Please get BMP done tomorrow with Dr. Gaston, if Creatinine is above 3.5 and/or potassium is above 5.5 please return to the ED.   Recommendations:  - Take sodium bicarbonate   - Hold Torsemide for now, resume once creatinine is less than 2.5   - Continue Lokelma for now   - Outpatient nehrology referral

## 2022-10-14 NOTE — CHART NOTE - NSCHARTNOTEFT_GEN_A_CORE
PACU ANESTHESIA ADMISSION NOTE      Procedure:   Post op diagnosis:      ____  Intubated  TV:______       Rate: ______      FiO2: ______    _x___  Patent Airway    _x___  Full return of protective reflexes    _x___  Full recovery from anesthesia / back to baseline status    Vitals:    BP  123/56  P  56  R  18  SaO2  97    Mental Status:  _x___ Awake   _____ Alert   _____ Drowsy   _____ Sedated    Nausea/Vomiting:  _x___  NO       ______Yes,   See Post - Op Orders         Pain Scale (0-10):  __0___    Treatment: _x___ None    ____ See Post - Op/PCA Orders    Post - Operative Fluids:   __x__ Oral   ____ See Post - Op Orders    Plan: Discharge:   _x___Home       _____Floor     _____Critical Care    _____  Other:_________________    Comments:  No anesthesia issues or complications noted.  Discharge when criteria met.

## 2022-10-14 NOTE — DISCHARGE NOTE PROVIDER - HOSPITAL COURSE
HPI:  76 year old female patient with PMHx of Severe AS, Anemia Likely Secondary to obscure GI bleeding (from Duodenum and Gastric AVM per recent capsule endoscopy 02/2022 after presenting with melena), DARRIN ( follows with Dr Silver) Receives Transfusions q1-2 weeks and Retacrit 35224 units weekly), Hypertension, CKD4., Sciatica presents for the above chief complaint. patient saw her hematologist few days ago, she was complaining of fatigue and mild shortness of breath on exertion. she denied chest pain, fever, chills, abdominal pain, N/V, hematuria, hematochezia or melena. she was sent to the ED by her hematologist after OP labs showed severe anemia      VITALS:   T(C): 36.6 (10-11-22 @ 15:46), Max: 36.6 (10-11-22 @ 15:46)  HR: 74 (10-11-22 @ 15:46) (74 - 74)  BP: 107/58 (10-11-22 @ 15:46) (107/58 - 107/58)  RR: 16 (10-11-22 @ 15:46) (16 - 16)  SpO2: 98% (10-11-22 @ 15:46) (98% - 98%)    in ED, vitals were WNL. labs were significant for a Hb od 5.5 and creat 2.3. patient received 1 unit PRBC and admitted to medicine for further investigations (11 Oct 2022 22:39)    Hospital course:  Patient was admitted for severe anemia hgb 5.5g/dL for which she received a total of 5units of PRBC through her stay with increase in Hgb to 8.8g/dL prior to EGD.   She underwent EGD/enteroscopy with **** without any complications.  Patient is medically stable and ready for discharge. HPI:  76 year old female patient with PMHx of Severe AS, Anemia Likely Secondary to obscure GI bleeding (from Duodenum and Gastric AVM per recent capsule endoscopy 02/2022 after presenting with melena), DARRIN ( follows with Dr Silver) Receives Transfusions q1-2 weeks and Retacrit 36437 units weekly), Hypertension, CKD4., Sciatica presents for the above chief complaint. patient saw her hematologist few days ago, she was complaining of fatigue and mild shortness of breath on exertion. she denied chest pain, fever, chills, abdominal pain, N/V, hematuria, hematochezia or melena. she was sent to the ED by her hematologist after OP labs showed severe anemia      VITALS:   T(C): 36.6 (10-11-22 @ 15:46), Max: 36.6 (10-11-22 @ 15:46)  HR: 74 (10-11-22 @ 15:46) (74 - 74)  BP: 107/58 (10-11-22 @ 15:46) (107/58 - 107/58)  RR: 16 (10-11-22 @ 15:46) (16 - 16)  SpO2: 98% (10-11-22 @ 15:46) (98% - 98%)    in ED, vitals were WNL. labs were significant for a Hb od 5.5 and creat 2.3. patient received 1 unit PRBC and admitted to medicine for further investigations (11 Oct 2022 22:39)    Hospital course:  Patient was admitted for severe anemia hgb 5.5g/dL for which she received a total of 5units of PRBC through her stay with increase in Hgb to 8.8g/dL prior to EGD.   She underwent EGD/enteroscopy with without any complications.   A few small bleeding diffuse angioectasias were seen in the antrum. The lesions were distributed in a watermelon-stomach pattern. Hemostasis was performed successfully at the antrum.                                               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 APC applied to bleeding sites with excellent hemostasis. APC applied to antrum at multiple locations with success.  Multiple small AVMs were noted in duodenal bulb and second part of duodenum. APCs were applied for the purpose of hemostasis.  No blood was noted in jejunum. 2 small non-bleeding AVMs were noted in proximal jejunum. APCs were applied   Plan  Protonix 40 mg twice a day  Will need repeat capsule endoscopy outpatient

## 2022-10-14 NOTE — DISCHARGE NOTE NURSING/CASE MANAGEMENT/SOCIAL WORK - PATIENT PORTAL LINK FT
You can access the FollowMyHealth Patient Portal offered by [facility] by registering at the following website: [email or web address]. By joining [facility] FollowMyHealth portal, you will also be able to view your health information using other applications (apps) compatible with our system.

## 2022-10-14 NOTE — PROGRESS NOTE ADULT - ASSESSMENT
76 year old female patient with PMHx of Severe AS, Anemia Likely Secondary to obscure GI bleeding (from Duodenum and Gastric AVM per recent capsule endoscopy 02/2022 after presenting with melena), DARRIN (follows with Dr Silver) Receives Transfusions q1-2 weeks and Retacrit 39444 units weekly), Hypertension, CKD4., Sciatica presents for EGD with enteroscopy after capsule endoscopy showed gastric and duodenal AVMs.     #Acute on Chronic Drop in Hemoglobin in Setting of Iron Deficiency Anemia  #Obscure GI bleed   #Duodenal and Gastric AVM Bleeding (seen on Capsule endoscopy)  # Hgb 5.5 s/p 5U PRBC =>8.8  - lethargy and mild SOB  - Hemodynamically stable in ED  - Transfuse to target Hgb >8   - Trend CBC closely and transfuse as needed  - Active Type and Screen  - 2 Large IV Bores  - c/w IV Protonix 40mg BID   - clear liquid diet  - Receives Transfusions q1-2 weeks and Retacrit 17172 units weekly  - Recent Admission in 02/2022 for melena: s/p capsule endoscopy showing bleeding from Duodenum and Gastric AVM   - Moderate Diverticulosis and Grade II Internal Hemorrhoids on colonoscopy 10/22/2019  - Erosive Gastritis on EGD 01/09/2020  - Off DVT prophylaxis  - Monitor BM  - GI eval: EGD/enteroscopy for GAVE and duodenal AVMs: Check procedure note and recs   - will most likely need cardio eval (Dr Paula) if any procedure planned by GI team    #Hypertension  - Home lopressor 25mg BID  - Monitor BP and avoid hypotension in setting of severe AS  - Resume home anti HTN     #H/O severe AS  - TTE 02/22 -> EF 74%  - severe AS  - followed by Dr Paula  - as per cardio last note -> Any Valve procedure either surgical or TAVR would require correction of the patient's anemia and optimization of her Hg/HCT to a tolerable level to proceed   - as per hem onc during last admission -> If hemoglobin of 10 is needed and patient continues to bleed it may not be possible to achieve this goal for the TAVR, thus we can administer a unit a day as long as the cardiology team feels patient will be able to tolerate this order to achieve the goal for her procedure    #CKD 4  - creat 2.3 Baseline Cr 1.7-2.3 2022  - Trend CMP and P daily  - Outpatient nephrology follow up  - lasix IV PRN and after PRBC transfusion    #MISC  - DVT Prophylaxis: off  - GI Prophylaxis Protonix  - Diet: Clear liquid  - Code Status: Full  - Dispo: home with HCS

## 2022-10-15 LAB
ANION GAP SERPL CALC-SCNC: 11 MMOL/L — SIGNIFICANT CHANGE UP (ref 7–14)
ANION GAP SERPL CALC-SCNC: 15 MMOL/L — HIGH (ref 7–14)
BUN SERPL-MCNC: 77 MG/DL — CRITICAL HIGH (ref 10–20)
BUN SERPL-MCNC: 78 MG/DL — CRITICAL HIGH (ref 10–20)
CALCIUM SERPL-MCNC: 8 MG/DL — LOW (ref 8.4–10.5)
CALCIUM SERPL-MCNC: 8.4 MG/DL — SIGNIFICANT CHANGE UP (ref 8.4–10.5)
CHLORIDE SERPL-SCNC: 111 MMOL/L — HIGH (ref 98–110)
CHLORIDE SERPL-SCNC: 113 MMOL/L — HIGH (ref 98–110)
CO2 SERPL-SCNC: 18 MMOL/L — SIGNIFICANT CHANGE UP (ref 17–32)
CO2 SERPL-SCNC: 19 MMOL/L — SIGNIFICANT CHANGE UP (ref 17–32)
CREAT SERPL-MCNC: 3 MG/DL — HIGH (ref 0.7–1.5)
CREAT SERPL-MCNC: 3.2 MG/DL — HIGH (ref 0.7–1.5)
EGFR: 14 ML/MIN/1.73M2 — LOW
EGFR: 16 ML/MIN/1.73M2 — LOW
GLUCOSE SERPL-MCNC: 116 MG/DL — HIGH (ref 70–99)
GLUCOSE SERPL-MCNC: 82 MG/DL — SIGNIFICANT CHANGE UP (ref 70–99)
HCT VFR BLD CALC: 29.2 % — LOW (ref 37–47)
HGB BLD-MCNC: 8.8 G/DL — LOW (ref 12–16)
MAGNESIUM SERPL-MCNC: 2.3 MG/DL — SIGNIFICANT CHANGE UP (ref 1.8–2.4)
MCHC RBC-ENTMCNC: 27.9 PG — SIGNIFICANT CHANGE UP (ref 27–31)
MCHC RBC-ENTMCNC: 30.1 G/DL — LOW (ref 32–37)
MCV RBC AUTO: 92.7 FL — SIGNIFICANT CHANGE UP (ref 81–99)
NRBC # BLD: 0 /100 WBCS — SIGNIFICANT CHANGE UP (ref 0–0)
PLATELET # BLD AUTO: 96 K/UL — LOW (ref 130–400)
POTASSIUM SERPL-MCNC: 4.3 MMOL/L — SIGNIFICANT CHANGE UP (ref 3.5–5)
POTASSIUM SERPL-MCNC: 5 MMOL/L — SIGNIFICANT CHANGE UP (ref 3.5–5)
POTASSIUM SERPL-SCNC: 4.3 MMOL/L — SIGNIFICANT CHANGE UP (ref 3.5–5)
POTASSIUM SERPL-SCNC: 5 MMOL/L — SIGNIFICANT CHANGE UP (ref 3.5–5)
RBC # BLD: 3.15 M/UL — LOW (ref 4.2–5.4)
RBC # FLD: 17.9 % — HIGH (ref 11.5–14.5)
SODIUM SERPL-SCNC: 141 MMOL/L — SIGNIFICANT CHANGE UP (ref 135–146)
SODIUM SERPL-SCNC: 146 MMOL/L — SIGNIFICANT CHANGE UP (ref 135–146)
WBC # BLD: 4.6 K/UL — LOW (ref 4.8–10.8)
WBC # FLD AUTO: 4.6 K/UL — LOW (ref 4.8–10.8)

## 2022-10-15 PROCEDURE — 99233 SBSQ HOSP IP/OBS HIGH 50: CPT

## 2022-10-15 PROCEDURE — 99232 SBSQ HOSP IP/OBS MODERATE 35: CPT

## 2022-10-15 RX ORDER — PANTOPRAZOLE SODIUM 20 MG/1
40 TABLET, DELAYED RELEASE ORAL
Refills: 0 | Status: DISCONTINUED | OUTPATIENT
Start: 2022-10-15 | End: 2022-10-16

## 2022-10-15 RX ADMIN — PANTOPRAZOLE SODIUM 40 MILLIGRAM(S): 20 TABLET, DELAYED RELEASE ORAL at 05:11

## 2022-10-15 RX ADMIN — Medication 325 MILLIGRAM(S): at 05:11

## 2022-10-15 RX ADMIN — Medication 25 MILLIGRAM(S): at 05:11

## 2022-10-15 RX ADMIN — Medication 25 MILLIGRAM(S): at 18:00

## 2022-10-15 RX ADMIN — Medication 25 MILLIGRAM(S): at 14:23

## 2022-10-15 RX ADMIN — PANTOPRAZOLE SODIUM 40 MILLIGRAM(S): 20 TABLET, DELAYED RELEASE ORAL at 18:00

## 2022-10-15 RX ADMIN — Medication 325 MILLIGRAM(S): at 18:00

## 2022-10-15 NOTE — PROGRESS NOTE ADULT - SUBJECTIVE AND OBJECTIVE BOX
Gastroenterology progress note:     Patient is a 76y old  Female who presents with a chief complaint of anemia on outpatient labs (13 Oct 2022 14:06)       Admitted on: 10-11-22    We are following the patient for: anemia     Interval History:  No abdominal pain, denies BM since being in the hospital    PAST MEDICAL & SURGICAL HISTORY:  HTN (hypertension)      Heart murmur      Eczema      Anemia  iron infusions and PRBC transfusions      Aortic stenosis      Chronic kidney disease (CKD)      2019 novel coronavirus disease (COVID-19)  4/2020- REHAB admission      H/O appendicitis      H/O cholecystitis  s/p cholecystectomy          MEDICATIONS  (STANDING):  iron sucrose IVPB 200 milliGRAM(s) IV Intermittent once  metoprolol tartrate 25 milliGRAM(s) Oral two times a day  pantoprazole  Injectable 40 milliGRAM(s) IV Push two times a day  torsemide 20 milliGRAM(s) Oral daily    MEDICATIONS  (PRN):  acetaminophen     Tablet .. 650 milliGRAM(s) Oral every 6 hours PRN Temp greater or equal to 38C (100.4F), Mild Pain (1 - 3), Moderate Pain (4 - 6), Severe Pain (7 - 10)  aluminum hydroxide/magnesium hydroxide/simethicone Suspension 30 milliLiter(s) Oral every 4 hours PRN Dyspepsia  melatonin 3 milliGRAM(s) Oral at bedtime PRN Insomnia  ondansetron Injectable 4 milliGRAM(s) IV Push every 8 hours PRN Nausea and/or Vomiting      Allergies  aspirin (Rash)  latex (Unknown)  penicillins (Unknown)      Review of Systems:   Constitutional: Ne Fever, No chills, No weakness  ENT: No visual changes, No throat pain  Cardiovascular:  No Chest Pain, No Palpitations  Respiratory:  No Cough, No Dyspnea  Gastrointestinal:  As described in HPI  Neurological: No numbness or weakness  Skin: No rash, no itching    Physical Examination:  T(C): 35.9 (10-13-22 @ 15:15), Max: 36.4 (10-12-22 @ 18:00)  HR: 63 (10-13-22 @ 15:15) (58 - 78)  BP: 103/57 (10-13-22 @ 15:15) (102/51 - 137/52)  RR: 18 (10-13-22 @ 13:34) (17 - 18)  SpO2: 98% (10-12-22 @ 18:00) (98% - 98%)      Constitutional: No acute distress.  Respiratory:  No signs of respiratory distress. Lung sounds are clear bilaterally.  Cardiovascular:  S1 S2, Regular rate and rhythm.  Abdominal: Abdomen is soft, symmetric, and non-tender without distention. There are no visible lesions. Bowel sounds are present and normoactive in all four quadrants. No masses, hepatomegaly, or splenomegaly are noted.   Skin: No rashes, No Jaundice.  Neurology: AAOX3, Non-focal  Skin: No rash, No excoriation  Vascular: No varicose vein, No cyanosis, No edema        Data: (reviewed by attending)                        7.7    4.93  )-----------( 91       ( 13 Oct 2022 07:38 )             24.2     Hgb trend:  7.7  10-13-22 @ 07:38  8.2  10-13-22 @ 00:53  7.7  10-12-22 @ 07:05  5.5  10-11-22 @ 20:32      10-13    144  |  111<H>  |  65<HH>  ----------------------------<  79  4.7   |  22  |  2.5<H>    Ca    9.0      13 Oct 2022 13:22  Mg     2.3     10-13    TPro  5.4<L>  /  Alb  3.3<L>  /  TBili  0.7  /  DBili  x   /  AST  14  /  ALT  <5  /  AlkPhos  73  10-13    Liver panel trend:  TBili 0.7   /   AST 14   /   ALT <5   /   AlkP 73   /   Tptn 5.4   /   Alb 3.3    /   DBili --      10-13  TBili 0.9   /   AST 14   /   ALT <5   /   AlkP 79   /   Tptn 5.7   /   Alb 3.6    /   DBili --      10-13  TBili 1.1   /   AST 12   /   ALT 5   /   AlkP 82   /   Tptn 5.5   /   Alb 3.5    /   DBili --      10-12  TBili 0.4   /   AST 12   /   ALT <5   /   AlkP 80   /   Tptn 5.7   /   Alb 3.4    /   DBili --      10-11  TBili 0.7   /   AST 12   /   ALT <5   /   AlkP 75   /   Tptn 5.6   /   Alb 3.6    /   DBili --      10-03      PT/INR - ( 11 Oct 2022 20:32 )   PT: 11.40 sec;   INR: 1.00 ratio         PTT - ( 11 Oct 2022 20:32 )  PTT:32.5 sec       Radiology: (reviewed by attending)      
SUBJECTIVE:    Patient is a 76y old Female who presents with a chief complaint of anemia on outpatient labs (13 Oct 2022 19:25)    Currently admitted to medicine with the primary diagnosis of Anemia       Today is hospital day 3d. This morning she is resting comfortably in bed and reports no new issues or overnight events.     PAST MEDICAL & SURGICAL HISTORY  HTN (hypertension)    Heart murmur    Eczema    Anemia  iron infusions and PRBC transfusions    Aortic stenosis    Chronic kidney disease (CKD)    2019 novel coronavirus disease (COVID-19)  2020- REHAB admission    H/O appendicitis    H/O cholecystitis  s/p cholecystectomy      SOCIAL HISTORY:  Negative for smoking/alcohol/drug use.     ALLERGIES:  aspirin (Rash)  latex (Unknown)  penicillins (Unknown)    MEDICATIONS:  STANDING MEDICATIONS  metoprolol tartrate 25 milliGRAM(s) Oral two times a day  pantoprazole  Injectable 40 milliGRAM(s) IV Push two times a day    PRN MEDICATIONS  acetaminophen     Tablet .. 650 milliGRAM(s) Oral every 6 hours PRN  aluminum hydroxide/magnesium hydroxide/simethicone Suspension 30 milliLiter(s) Oral every 4 hours PRN  melatonin 3 milliGRAM(s) Oral at bedtime PRN  ondansetron Injectable 4 milliGRAM(s) IV Push every 8 hours PRN    VITALS:   T(F): 96.7  HR: 66  BP: 115/68  RR: 18  SpO2: --    LABS:                        8.8    4.76  )-----------( 98       ( 14 Oct 2022 07:35 )             28.3     10-14    139  |  110  |  74<HH>  ----------------------------<  105<H>  5.2<H>   |  17  |  2.8<H>    Ca    8.4      14 Oct 2022 07:35  Mg     2.4     10-14    TPro  5.6<L>  /  Alb  3.4<L>  /  TBili  0.8  /  DBili  x   /  AST  15  /  ALT  6   /  AlkPhos  73  10-14      Urinalysis Basic - ( 13 Oct 2022 19:30 )    Color: Light Yellow / Appearance: Clear / S.013 / pH: x  Gluc: x / Ketone: Negative  / Bili: Negative / Urobili: <2 mg/dL   Blood: x / Protein: Negative / Nitrite: Negative   Leuk Esterase: Large / RBC: 1 /HPF / WBC 11 /HPF   Sq Epi: x / Non Sq Epi: 4 /HPF / Bacteria: Negative                RADIOLOGY:    PHYSICAL EXAM:  GEN: No acute distress  LUNGS: Clear to auscultation bilaterally   HEART: Regular  ABD: Soft, non-tender, non-distended.  EXT: NC/NC/NE/2+PP/VERGARA/Skin Intact.   NEURO: AAOX3    Intravenous access:   NG tube:   Arora Catheter:       
  ARREAGALATRICIA  76y  Female  ***My note supersedes ALL resident notes that I sign.  My corrections for their notes are in my note.***    I can be reached directly on [email or web address]. My office number is [phone]. My personal cell number is [phone].    INTERVAL EVENTS: Here for f/u of anemia. Pt for scopes today. Pt had scopes 2019 and capsule endo earlier this year. Sees H/O as outpt for iron infusions and bld tx w/ Dr Gaston - has portacath. Pt has no pain. Denies bleeding.    T(F): 96.5 (10-13-22 @ 15:57), Max: 97.6 (10-13-22 @ 13:34)  HR: 63 (10-13-22 @ 18:03) (58 - 70)  BP: 126/58 (10-13-22 @ 18:03) (102/51 - 126/58)  RR: 18 (10-13-22 @ 15:57) (18 - 18)  SpO2: --    BMI 36.9    Gen: NAD  HEENT: PERRL, EOMI, mouth clr, nose clr  Neck: no nodes, no JVD, thyroid nl  lungs: clr  hrt: s1 s2 rrr no murmur  abd: soft, NT/ND, no HS megaly  ext: no edema, no c/c  neuro: aa ox3, cn intact, can move all 4 ext    LABS:                      7.7     (    87.7   4.93  )-----------( ---------      91       ( 13 Oct 2022 07:38 )             24.2    (    17.2     Hemoglobin: 7.7 g/dL (10-13 @ 07:38) - give 1u prbc per GI  Hemoglobin: 8.2 g/dL (10-13 @ 00:53)  Hemoglobin: 7.7 g/dL (10-12 @ 07:05)  Hemoglobin: 5.5 g/dL (10-11 @ 20:32)    Platelet Count - Automated: 91 K/uL (10-13-22 @ 07:38) - stable  Platelet Count - Automated: 103 K/uL (10-13-22 @ 00:53)  Platelet Count - Automated: 93 K/uL (10-12-22 @ 07:05)  Platelet Count - Automated: 104 K/uL (10-11-22 @ 20:32)    144   (   111   (   79      10-13-22 @ 13:22  ----------------------               4.7   (   22   (   65                             -----                        2.5  Ca  9.0   Mg  --    P   --     142   (   115   (   124      10-13-22 @ 07:38  ----------------------               5.6   (   18   (   64                             -----                        2.5  Ca  8.6   Mg  2.3    P   --     LFT  5.4  (  0.7  (  14       10-13-22 @ 07:38  -------------------------  3.3  (  73  (  <5    PT/INR - ( 11 Oct 2022 20:32 )   PT: 11.40 sec;   INR: 1.00 ratio    PTT - ( 11 Oct 2022 20:32 )  PTT: 32.5 sec    CAPILLARY BLOOD GLUCOSE  POCT Blood Glucose.: 122 (10-13-22 @ 11:06)    RADIOLOGY & ADDITIONAL TESTS:  < from: CT Abdomen and Pelvis w/ IV Cont (09.06.22 @ 17:53) >  IMPRESSION: No acute intra-abdominal pathology.    Stable left adrenal gland (non-FDG avid in August 2022).    < end of copied text >    < from: TTE Echo Complete w/o Contrast w/ Doppler (02.17.22 @ 12:23) >  Summary:   1. Normal global left ventricular systolic function.   2. LV Ejection Fraction by Brown's Method with a biplane EF of 74 %.   3. Normal left ventricular internal cavity size.   4. Mild mitral valve regurgitation.   5. Severe aortic valve stenosis.   6. Estimated pulmonary artery systolic pressure is 44.5 mmHg assuming a   right atrial pressure of 15 mmHg, which is consistent with mild pulmonary hypertension.   7. Pulmonary hypertension is present.   8. Mitral valve mean gradient is 4.6 mmHg consistent with mild mitral stenosis.   9. Peak transaortic gradient equals 86.1 mmHg, mean transaortic gradient   equals 48.9 mmHg, the calculated aortic valve area equals 0.80 cm² by the   continuity equation consistent with severe aortic stenosis.    < end of copied text >    MEDICATIONS:    acetaminophen     Tablet .. 650 milliGRAM(s) Oral every 6 hours PRN  aluminum hydroxide/magnesium hydroxide/simethicone Suspension 30 milliLiter(s) Oral every 4 hours PRN  iron sucrose IVPB 200 milliGRAM(s) IV Intermittent once  melatonin 3 milliGRAM(s) Oral at bedtime PRN  metoprolol tartrate 25 milliGRAM(s) Oral two times a day  ondansetron Injectable 4 milliGRAM(s) IV Push every 8 hours PRN  pantoprazole  Injectable 40 milliGRAM(s) IV Push two times a day  torsemide 20 milliGRAM(s) Oral daily  
    Pt seen and examined at bedside.     VITAL SIGNS (Last 24 hrs):  T(C): 37 (10-15-22 @ 04:30), Max: 37 (10-15-22 @ 04:30)  HR: 59 (10-15-22 @ 04:30) (55 - 66)  BP: 101/47 (10-15-22 @ 04:30) (101/47 - 156/66)  RR: 18 (10-15-22 @ 04:30) (15 - 18)  SpO2: 100% (10-14-22 @ 18:23) (100% - 100%)  Wt(kg): --  Daily Height in cm: 170.18 (14 Oct 2022 16:27)    Daily     I&O's Summary      PHYSICAL EXAM:  GENERAL: NAD, obese   HEAD:  Atraumatic, Normocephalic  EYES:  conjunctiva and sclera clear  NECK: Supple, No JVD  CHEST/LUNG: Clear to auscultation bilaterally; No wheeze  HEART: Regular rate and rhythm; No murmurs, rubs, or gallops  ABDOMEN: Soft, Nontender, Nondistended; Bowel sounds present  EXTREMITIES:  2+ Peripheral Pulses, No clubbing, cyanosis, or edema  PSYCH: AAOx3  NEUROLOGY: non-focal  SKIN: No rashes or lesions    Labs Reviewed  Spoke to patient in regards to abnormal labs.    CBC Full  -  ( 15 Oct 2022 04:30 )  WBC Count : 4.60 K/uL  Hemoglobin : 8.8 g/dL  Hematocrit : 29.2 %  Platelet Count - Automated : 96 K/uL  Mean Cell Volume : 92.7 fL  Mean Cell Hemoglobin : 27.9 pg  Mean Cell Hemoglobin Concentration : 30.1 g/dL  Auto Neutrophil # : x  Auto Lymphocyte # : x  Auto Monocyte # : x  Auto Eosinophil # : x  Auto Basophil # : x  Auto Neutrophil % : x  Auto Lymphocyte % : x  Auto Monocyte % : x  Auto Eosinophil % : x  Auto Basophil % : x    BMP:    10-15 @ 04:30    Blood Urea Nitrogen - 78  Calcium - 8.0  Carbond Dioxide - 19  Chloride - 111  Creatinine - 3.2  Glucose - 116  Potassium - 4.3  Sodium - 141       PT/INR - ( 11 Oct 2022 20:32 )   PT: 11.40 sec;   INR: 1.00 ratio         PTT - ( 11 Oct 2022 20:32 )  PTT:32.5 sec       MEDICATIONS  (STANDING):  metoprolol tartrate 25 milliGRAM(s) Oral two times a day  pantoprazole  Injectable 40 milliGRAM(s) IV Push two times a day  sodium bicarbonate 325 milliGRAM(s) Oral every 12 hours    MEDICATIONS  (PRN):  acetaminophen     Tablet .. 650 milliGRAM(s) Oral every 6 hours PRN Temp greater or equal to 38C (100.4F), Mild Pain (1 - 3), Moderate Pain (4 - 6), Severe Pain (7 - 10)  aluminum hydroxide/magnesium hydroxide/simethicone Suspension 30 milliLiter(s) Oral every 4 hours PRN Dyspepsia  diphenhydrAMINE 25 milliGRAM(s) Oral every 6 hours PRN Rash and/or Itching  melatonin 3 milliGRAM(s) Oral at bedtime PRN Insomnia  ondansetron Injectable 4 milliGRAM(s) IV Push every 8 hours PRN Nausea and/or Vomiting  
  ARREAGALATRICIA BARRON  76y  Female  ***My note supersedes ALL resident notes that I sign.  My corrections for their notes are in my note.***    I can be reached directly on [email or web address]. My office number is [phone]. My personal cell number is [phone].    INTERVAL EVENTS: Here for f/u of gi bleed. Pt doing OK. No complaints.    T(F): 97.1 (10-14-22 @ 18:08), Max: 97.1 (10-14-22 @ 18:08)  HR: 55 (10-14-22 @ 18:23) (55 - 66)  BP: 155/64 (10-14-22 @ 18:23) (107/53 - 156/66)  RR: 15 (10-14-22 @ 18:23) (15 - 18)  SpO2: 100% (10-14-22 @ 18:23) (100% - 100%)    Gen: NAD  HEENT: PERRL, EOMI, mouth clr, nose clr  Neck: no nodes, no JVD, thyroid nl  lungs: clr  hrt: s1 s2 rrr no murmur  abd: soft, NT/ND, no HS megaly  ext: no edema, no c/c  neuro: aa ox3, cn intact, can move all 4 ext    LABS:                       8.8     (    90.1   4.76  )-----------( ---------      98       ( 14 Oct 2022 07:35 )             28.3    (    17.2     Hemoglobin: 8.8 g/dL (10-14 @ 07:35)  Hemoglobin: 9.2 g/dL (10-13 @ 23:20)  Hemoglobin: 7.7 g/dL (10-13 @ 07:38)  Hemoglobin: 8.2 g/dL (10-13 @ 00:53)  Hemoglobin: 7.7 g/dL (10-12 @ 07:05)  Hemoglobin: 5.5 g/dL (10-11 @ 20:32)    139   (   110   (   105      10-14-22 @ 07:35  ----------------------               5.2   (   17   (   74                             -----                        2.8  Ca  8.4   Mg  2.4    P   --     Creatinine:   2.8 (10-14 @ 07:35)  eGFR:  17    Creatinine:   2.5 (10-13 @ 13:22)  eGFR:  19    Creatinine:   2.5 (10-13 @ 07:38)  eGFR:  19    Creatinine:   2.3 (10-13 @ 00:53)  eGFR:  21    Creatinine:   2.1 (10-12 @ 07:05)  eGFR:  24    Creatinine:   2.3 (10-11 @ 20:32)  eGFR:  21      LFT  5.6  (  0.8  (  15       10-14-22 @ 07:35  -------------------------  3.4  (  73  (  6    Urinalysis Basic - ( 13 Oct 2022 19:30 )    Color: Light Yellow / Appearance: Clear / S.013 / pH: x  Gluc: x / Ketone: Negative  / Bili: Negative / Urobili: <2 mg/dL   Blood: x / Protein: Negative / Nitrite: Negative   Leuk Esterase: Large / RBC: 1 /HPF / WBC 11 /HPF   Sq Epi: x / Non Sq Epi: 4 /HPF / Bacteria: Negative    CAPILLARY BLOOD GLUCOSE  POCT Blood Glucose.: 122 (10-13-22 @ 11:06)    RADIOLOGY & ADDITIONAL TESTS:    MEDICATIONS:    acetaminophen     Tablet .. 650 milliGRAM(s) Oral every 6 hours PRN  aluminum hydroxide/magnesium hydroxide/simethicone Suspension 30 milliLiter(s) Oral every 4 hours PRN  melatonin 3 milliGRAM(s) Oral at bedtime PRN  metoprolol tartrate 25 milliGRAM(s) Oral two times a day  ondansetron Injectable 4 milliGRAM(s) IV Push every 8 hours PRN  pantoprazole  Injectable 40 milliGRAM(s) IV Push two times a day  
LATRICIA ARREAGA 76y Female  MRN#: [id number]     SUBJECTIVE  Patient is a 76y old Female who presents with a chief complaint of anemia on outpatient labs (15 Oct 2022 13:07)  Today is hospital day 4d, and this morning she is lying in bed without distress.   No acute overnight events.     OBJECTIVE  PAST MEDICAL & SURGICAL HISTORY  HTN (hypertension)    Heart murmur    Eczema    Anemia  iron infusions and PRBC transfusions    Aortic stenosis    Chronic kidney disease (CKD)    2019 novel coronavirus disease (COVID-19)  2020- REHAB admission    H/O appendicitis    H/O cholecystitis  s/p cholecystectomy      ALLERGIES:  aspirin (Rash)  latex (Unknown)  penicillins (Unknown)    MEDICATIONS:  STANDING MEDICATIONS  metoprolol tartrate 25 milliGRAM(s) Oral two times a day  pantoprazole    Tablet 40 milliGRAM(s) Oral two times a day  sodium bicarbonate 325 milliGRAM(s) Oral every 12 hours    PRN MEDICATIONS  acetaminophen     Tablet .. 650 milliGRAM(s) Oral every 6 hours PRN  aluminum hydroxide/magnesium hydroxide/simethicone Suspension 30 milliLiter(s) Oral every 4 hours PRN  diphenhydrAMINE 25 milliGRAM(s) Oral every 6 hours PRN  melatonin 3 milliGRAM(s) Oral at bedtime PRN  ondansetron Injectable 4 milliGRAM(s) IV Push every 8 hours PRN    HOME MEDICATIONS  Home Medications:  metoprolol tartrate 25 mg oral tablet: 1 tab(s) orally 2 times a day (14 Oct 2022 16:12)  pantoprazole 40 mg intravenous injection: 40 milligram(s) intravenous 2 times a day (14 Oct 2022 16:12)      VITAL SIGNS: Last 24 Hours  T(C): 35.8 (15 Oct 2022 13:40), Max: 37 (15 Oct 2022 04:30)  T(F): 96.4 (15 Oct 2022 13:40), Max: 98.6 (15 Oct 2022 04:30)  HR: 58 (15 Oct 2022 13:40) (55 - 61)  BP: 99/46 (15 Oct 2022 13:40) (99/46 - 156/66)  BP(mean): --  RR: 18 (15 Oct 2022 13:40) (15 - 18)  SpO2: 100% (14 Oct 2022 18:23) (100% - 100%)      LABS:                        8.8    4.60  )-----------( 96       ( 15 Oct 2022 04:30 )             29.2     10-15    141  |  111<H>  |  78<HH>  ----------------------------<  116<H>  4.3   |  19  |  3.2<H>    Ca    8.0<L>      15 Oct 2022 04:30  Mg     2.3     10-15    TPro  5.6<L>  /  Alb  3.4<L>  /  TBili  0.8  /  DBili  x   /  AST  15  /  ALT  6   /  AlkPhos  73  10-14    LIVER FUNCTIONS - ( 14 Oct 2022 07:35 )  Alb: 3.4 g/dL / Pro: 5.6 g/dL / ALK PHOS: 73 U/L / ALT: 6 U/L / AST: 15 U/L / GGT: x             Urinalysis Basic - ( 13 Oct 2022 19:30 )    Color: Light Yellow / Appearance: Clear / S.013 / pH: x  Gluc: x / Ketone: Negative  / Bili: Negative / Urobili: <2 mg/dL   Blood: x / Protein: Negative / Nitrite: Negative   Leuk Esterase: Large / RBC: 1 /HPF / WBC 11 /HPF   Sq Epi: x / Non Sq Epi: 4 /HPF / Bacteria: Negative      CAPILLARY BLOOD GLUCOSE          RADIOLOGY:    PHYSICAL EXAM:  GENERAL: NAD, AAO x 4, 76y F  HEAD:  Atraumatic, Normocephalic  EYES: EOMI, conjunctivae clear and sclerae white  NECK: Supple, No JVD  CHEST/LUNG: Clear to auscultation bilaterally; No wheeze; No crackles; No accessory muscles used  HEART: Regular rate and rhythm; No murmurs;   ABDOMEN: Soft, Nontender, Nondistended; Bowel sounds present; No guarding  EXTREMITIES:  2+ Peripheral Pulses, No cyanosis or edema  NEUROLOGY: non-focal    ADMISSION SUMMARY  Patient is a 76y old Female who presents with a chief complaint of anemia on outpatient labs (15 Oct 2022 13:07)   
SUBJECTIVE:    Patient is a 76y old Female who presents with a chief complaint of anemia on outpatient labs (12 Oct 2022 13:54)    Currently admitted to medicine with the primary diagnosis of Anemia       Today is hospital day 2d. This morning she is resting comfortably in bed and reports no new issues or overnight events.     PAST MEDICAL & SURGICAL HISTORY  HTN (hypertension)    Heart murmur    Eczema    Anemia  iron infusions and PRBC transfusions    Aortic stenosis    Chronic kidney disease (CKD)    2019 novel coronavirus disease (COVID-19)  4/2020- REHAB admission    H/O appendicitis    H/O cholecystitis  s/p cholecystectomy      SOCIAL HISTORY:  Negative for smoking/alcohol/drug use.     ALLERGIES:  aspirin (Rash)  latex (Unknown)  penicillins (Unknown)    MEDICATIONS:  STANDING MEDICATIONS  metoprolol tartrate 25 milliGRAM(s) Oral two times a day  pantoprazole  Injectable 40 milliGRAM(s) IV Push two times a day  torsemide 20 milliGRAM(s) Oral daily    PRN MEDICATIONS  acetaminophen     Tablet .. 650 milliGRAM(s) Oral every 6 hours PRN  aluminum hydroxide/magnesium hydroxide/simethicone Suspension 30 milliLiter(s) Oral every 4 hours PRN  melatonin 3 milliGRAM(s) Oral at bedtime PRN  ondansetron Injectable 4 milliGRAM(s) IV Push every 8 hours PRN    VITALS:   T(F): 97.6  HR: 58  BP: 119/60  RR: 18  SpO2: 98%    LABS:                        7.7    4.93  )-----------( 91       ( 13 Oct 2022 07:38 )             24.2     10-13    142  |  115<H>  |  64<HH>  ----------------------------<  124<H>  5.6<H>   |  18  |  2.5<H>    Ca    8.6      13 Oct 2022 07:38  Mg     2.3     10-13    TPro  5.4<L>  /  Alb  3.3<L>  /  TBili  0.7  /  DBili  x   /  AST  14  /  ALT  <5  /  AlkPhos  73  10-13    PT/INR - ( 11 Oct 2022 20:32 )   PT: 11.40 sec;   INR: 1.00 ratio         PTT - ( 11 Oct 2022 20:32 )  PTT:32.5 sec        PHYSICAL EXAM:  GEN: No acute distress  LUNGS: Clear to auscultation bilaterally   HEART: Regular  ABD: Soft, non-tender, non-distended.  EXT: NC/NC/NE/2+PP/VERGARA/Skin Intact.   NEURO: AAOX3    Intravenous access:   NG tube:   Arora Catheter:

## 2022-10-15 NOTE — PROGRESS NOTE ADULT - ASSESSMENT
76 year old woman with PMHx of Severe AS, Anemia Likely Secondary to obscure GI bleeding (from Duodenum and Gastric AVM per recent capsule endoscopy 02/2022 after presenting with melena), DARRIN (follows with Dr Gaston). Receives Transfusions q1-2 weeks and Retacrit 38189 units weekly), Hypertension, CKD4., Sciatica presents for anemia.    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; Moderate Diverticulosis; Grade II Internal Hemorrhoids; chr anemia; iron def  in 02/2022: s/p capsule endoscopy showing bleeding from Duodenum and Gastric AVM   s/p 4 unit PRBCs  keep hgb >8; Active Type and Screen  Receives Transfusions q1-2 weeks and Retacrit 30,000 units qweekly  Plan:   cbc q12h   C/w PPI BID   advance diet to solids   GI: s/p EGD 10/14 (see GI note): antral GAVE and multiple AVMs found (was scoped to jejunum) and cauterized   GI f/u regarding VCE     # Hypertension  c/w lopressor 25mg BID    # H/O severe AS  TTE 02/22 -> EF 74%, severe AS  followed by Dr Paula  TAVR might be considered outpatient - but would need bleeding controlled first as there is a brief need for a/c post-TAVR    # Mild EVAN on CKD 4  base Cr mid 2s  Hold home torsemide     # BMI 36.9 = obesity  wt loss advised    # DVT PPX: SCD    Dispo: Monitor Creatinine and hgb, advance diet   Plan of care d/w patient   DispoL Home

## 2022-10-15 NOTE — PROGRESS NOTE ADULT - PROVIDER SPECIALTY LIST ADULT
Heme/Onc
Gastroenterology
Internal Medicine

## 2022-10-15 NOTE — PROGRESS NOTE ADULT - ATTENDING COMMENTS
seen/examined w/ [facility]monc fellow;note reviewed; case discussed  continue current management and f/u Dr Gaston  call [facility]monc PRN

## 2022-10-15 NOTE — PROGRESS NOTE ADULT - REASON FOR ADMISSION
anemia on outpatient labs

## 2022-10-15 NOTE — PROGRESS NOTE ADULT - ASSESSMENT
76 year old F with history of transfusion depended anemia 2/2 GI bleeding. Patient was sent by Dr. Gaston because her hgb yesterday was 5.9. She endorsed SOB and fatigue and denied any bleeding.     # Anemia likely secondary to obscure GI bleeding, stable  - Patient's hemoglobin is usually around 7, on admission it was 5.9  - S/P 2 units PRBC --> hgb 7.7  - Patients anemia has been worsening as of a month ago, she was receiving transfusions q2 weeks and now every week  - Capsule endoscopy in earlier 2022 was positive for bleeding in the stomach and AVM in duodenum, further intervention was suggested but patient denied  - On Retacrit 30K weekly (Received 10/11/22)    RECOMMENDATIONS:   - S/p 3U PRBC; would not recommend more PRBC, and would give lasix along with PRBC to avoid volume overload given hx of AS  - S/P EGD/colonoscopy which shows: stomach with a few small bleeding diffuse angioectasias were seen in the antrum. The lesions were distributed in a watermelon-stomach pattern. Duodenum shows multiple small AVMs were noted in duodenal bulb and second part of duodenum. Proximal jejunum examined. No blood was noted in jejunum. 2 small non-bleeding AVMs were noted in proximal jejunum.   - GI plans for capsule endoscopy on Monday, f/u result  - c/w retacrit 30k weekly   - pt can be d/c from heme perspective     Rest of management per primary team.

## 2022-10-16 VITALS
SYSTOLIC BLOOD PRESSURE: 132 MMHG | HEART RATE: 63 BPM | RESPIRATION RATE: 18 BRPM | DIASTOLIC BLOOD PRESSURE: 58 MMHG | TEMPERATURE: 96 F

## 2022-10-16 LAB
ALBUMIN SERPL ELPH-MCNC: 3.5 G/DL — SIGNIFICANT CHANGE UP (ref 3.5–5.2)
ALP SERPL-CCNC: 72 U/L — SIGNIFICANT CHANGE UP (ref 30–115)
ALT FLD-CCNC: 8 U/L — SIGNIFICANT CHANGE UP (ref 0–41)
ANION GAP SERPL CALC-SCNC: 11 MMOL/L — SIGNIFICANT CHANGE UP (ref 7–14)
AST SERPL-CCNC: 15 U/L — SIGNIFICANT CHANGE UP (ref 0–41)
BILIRUB SERPL-MCNC: 1.1 MG/DL — SIGNIFICANT CHANGE UP (ref 0.2–1.2)
BUN SERPL-MCNC: 74 MG/DL — CRITICAL HIGH (ref 10–20)
CALCIUM SERPL-MCNC: 8.2 MG/DL — LOW (ref 8.4–10.5)
CHLORIDE SERPL-SCNC: 114 MMOL/L — HIGH (ref 98–110)
CO2 SERPL-SCNC: 19 MMOL/L — SIGNIFICANT CHANGE UP (ref 17–32)
CREAT SERPL-MCNC: 3.3 MG/DL — HIGH (ref 0.7–1.5)
EGFR: 14 ML/MIN/1.73M2 — LOW
GLUCOSE SERPL-MCNC: 81 MG/DL — SIGNIFICANT CHANGE UP (ref 70–99)
HCT VFR BLD CALC: 31 % — LOW (ref 37–47)
HCT VFR BLD CALC: 31.5 % — LOW (ref 37–47)
HGB BLD-MCNC: 9.7 G/DL — LOW (ref 12–16)
HGB BLD-MCNC: 9.8 G/DL — LOW (ref 12–16)
MAGNESIUM SERPL-MCNC: 2.2 MG/DL — SIGNIFICANT CHANGE UP (ref 1.8–2.4)
MCHC RBC-ENTMCNC: 28.8 PG — SIGNIFICANT CHANGE UP (ref 27–31)
MCHC RBC-ENTMCNC: 29 PG — SIGNIFICANT CHANGE UP (ref 27–31)
MCHC RBC-ENTMCNC: 31.1 G/DL — LOW (ref 32–37)
MCHC RBC-ENTMCNC: 31.3 G/DL — LOW (ref 32–37)
MCV RBC AUTO: 92.6 FL — SIGNIFICANT CHANGE UP (ref 81–99)
MCV RBC AUTO: 92.8 FL — SIGNIFICANT CHANGE UP (ref 81–99)
NRBC # BLD: 0 /100 WBCS — SIGNIFICANT CHANGE UP (ref 0–0)
NRBC # BLD: 0 /100 WBCS — SIGNIFICANT CHANGE UP (ref 0–0)
PLATELET # BLD AUTO: 102 K/UL — LOW (ref 130–400)
PLATELET # BLD AUTO: 99 K/UL — LOW (ref 130–400)
POTASSIUM SERPL-MCNC: 5 MMOL/L — SIGNIFICANT CHANGE UP (ref 3.5–5)
POTASSIUM SERPL-SCNC: 5 MMOL/L — SIGNIFICANT CHANGE UP (ref 3.5–5)
PROT SERPL-MCNC: 5.4 G/DL — LOW (ref 6–8)
RBC # BLD: 3.34 M/UL — LOW (ref 4.2–5.4)
RBC # BLD: 3.4 M/UL — LOW (ref 4.2–5.4)
RBC # FLD: 18.3 % — HIGH (ref 11.5–14.5)
RBC # FLD: 18.3 % — HIGH (ref 11.5–14.5)
SODIUM SERPL-SCNC: 144 MMOL/L — SIGNIFICANT CHANGE UP (ref 135–146)
WBC # BLD: 4.55 K/UL — LOW (ref 4.8–10.8)
WBC # BLD: 5.33 K/UL — SIGNIFICANT CHANGE UP (ref 4.8–10.8)
WBC # FLD AUTO: 4.55 K/UL — LOW (ref 4.8–10.8)
WBC # FLD AUTO: 5.33 K/UL — SIGNIFICANT CHANGE UP (ref 4.8–10.8)

## 2022-10-16 PROCEDURE — 99239 HOSP IP/OBS DSCHRG MGMT >30: CPT

## 2022-10-16 RX ORDER — SODIUM ZIRCONIUM CYCLOSILICATE 10 G/10G
10 POWDER, FOR SUSPENSION ORAL
Qty: 300 | Refills: 0
Start: 2022-10-16 | End: 2022-11-14

## 2022-10-16 RX ORDER — PANTOPRAZOLE SODIUM 20 MG/1
1 TABLET, DELAYED RELEASE ORAL
Qty: 60 | Refills: 0
Start: 2022-10-16 | End: 2022-11-14

## 2022-10-16 RX ORDER — SODIUM BICARBONATE 1 MEQ/ML
1 SYRINGE (ML) INTRAVENOUS
Qty: 90 | Refills: 0
Start: 2022-10-16 | End: 2022-11-14

## 2022-10-16 RX ADMIN — Medication 325 MILLIGRAM(S): at 05:30

## 2022-10-16 RX ADMIN — Medication 300 UNIT(S): at 14:25

## 2022-10-16 RX ADMIN — Medication 25 MILLIGRAM(S): at 05:29

## 2022-10-16 RX ADMIN — PANTOPRAZOLE SODIUM 40 MILLIGRAM(S): 20 TABLET, DELAYED RELEASE ORAL at 05:31

## 2022-10-16 NOTE — CHART NOTE - NSCHARTNOTEFT_GEN_A_CORE
Plan for VCE tomorrow   liquid diet today,   2 L golytely start at 4pm   NPO after midnight for capsule endoscopy tomorrow

## 2022-10-17 ENCOUNTER — APPOINTMENT (OUTPATIENT)
Dept: INFUSION THERAPY | Facility: CLINIC | Age: 77
End: 2022-10-17

## 2022-10-17 LAB
BASOPHILS # BLD AUTO: 0.01 K/UL
BASOPHILS NFR BLD AUTO: 0.2 %
EOSINOPHIL # BLD AUTO: 0.55 K/UL
EOSINOPHIL NFR BLD AUTO: 10.7 %
HCT VFR BLD CALC: 31.4 %
HGB BLD-MCNC: 9.7 G/DL
IMM GRANULOCYTES NFR BLD AUTO: 0.6 %
LYMPHOCYTES # BLD AUTO: 0.56 K/UL
LYMPHOCYTES NFR BLD AUTO: 10.9 %
MAN DIFF?: NORMAL
MCHC RBC-ENTMCNC: 29 PG
MCHC RBC-ENTMCNC: 30.9 G/DL
MCV RBC AUTO: 94 FL
MONOCYTES # BLD AUTO: 0.46 K/UL
MONOCYTES NFR BLD AUTO: 8.9 %
NEUTROPHILS # BLD AUTO: 3.55 K/UL
NEUTROPHILS NFR BLD AUTO: 68.7 %
PLATELET # BLD AUTO: 109 K/UL
RBC # BLD: 3.34 M/UL
RBC # FLD: 18.6 %
WBC # FLD AUTO: 5.16 K/UL

## 2022-10-17 RX ORDER — ERYTHROPOIETIN 10000 [IU]/ML
30000 INJECTION, SOLUTION INTRAVENOUS; SUBCUTANEOUS ONCE
Refills: 0 | Status: COMPLETED | OUTPATIENT
Start: 2022-10-17 | End: 2022-10-17

## 2022-10-17 RX ORDER — IRON SUCROSE 20 MG/ML
200 INJECTION, SOLUTION INTRAVENOUS ONCE
Refills: 0 | Status: COMPLETED | OUTPATIENT
Start: 2022-10-17 | End: 2022-10-17

## 2022-10-17 RX ADMIN — IRON SUCROSE 110 MILLIGRAM(S): 20 INJECTION, SOLUTION INTRAVENOUS at 12:45

## 2022-10-17 RX ADMIN — ERYTHROPOIETIN 30000 UNIT(S): 10000 INJECTION, SOLUTION INTRAVENOUS; SUBCUTANEOUS at 13:45

## 2022-10-18 LAB
FERRITIN SERPL-MCNC: 136 NG/ML
SURGICAL PATHOLOGY STUDY: SIGNIFICANT CHANGE UP

## 2022-10-20 NOTE — ED PROVIDER NOTE - CLINICAL SUMMARY MEDICAL DECISION MAKING FREE TEXT BOX
Detail Level: Detailed
74 year old female, hx HTN, sent in by PMD for possible blood transfusion after found to have low hemoglobin. Patient had dark stools x3 weeks ago with associated SOB, found to have hgb ~5, had 3 units pRBCs. Patient was seen by PMD yesterday, had CBC and found to have hgb 7, sent to ED for possible transfusions. Denies fever, chills, hemoptysis, dark stools, SOB, chest pain, presyncopal symptoms, syncope. Last endoscopy and colonoscopy x1 week ago that was unremarkable for source of bleeding.  Patient is scheduled for continued GI work up.    On exam, VS reviewed.  Patient has mild conjunctival pallor.  Lungs and heart sounds normal and no signs of bleeding.  IV placed, labs sent and Hb checked. Hb 6-7.  Will send to OBS status for transfusion.

## 2022-10-24 ENCOUNTER — APPOINTMENT (OUTPATIENT)
Dept: INFUSION THERAPY | Facility: CLINIC | Age: 77
End: 2022-10-24

## 2022-10-24 ENCOUNTER — APPOINTMENT (OUTPATIENT)
Dept: HEMATOLOGY ONCOLOGY | Facility: CLINIC | Age: 77
End: 2022-10-24

## 2022-10-24 DIAGNOSIS — K57.90 DIVERTICULOSIS OF INTESTINE, PART UNSPECIFIED, WITHOUT PERFORATION OR ABSCESS WITHOUT BLEEDING: ICD-10-CM

## 2022-10-24 DIAGNOSIS — Z88.0 ALLERGY STATUS TO PENICILLIN: ICD-10-CM

## 2022-10-24 DIAGNOSIS — Z90.49 ACQUIRED ABSENCE OF OTHER SPECIFIED PARTS OF DIGESTIVE TRACT: ICD-10-CM

## 2022-10-24 DIAGNOSIS — E87.5 HYPERKALEMIA: ICD-10-CM

## 2022-10-24 DIAGNOSIS — N17.9 ACUTE KIDNEY FAILURE, UNSPECIFIED: ICD-10-CM

## 2022-10-24 DIAGNOSIS — Z91.040 LATEX ALLERGY STATUS: ICD-10-CM

## 2022-10-24 DIAGNOSIS — I35.0 NONRHEUMATIC AORTIC (VALVE) STENOSIS: ICD-10-CM

## 2022-10-24 DIAGNOSIS — Z86.16 PERSONAL HISTORY OF COVID-19: ICD-10-CM

## 2022-10-24 DIAGNOSIS — I12.9 HYPERTENSIVE CHRONIC KIDNEY DISEASE WITH STAGE 1 THROUGH STAGE 4 CHRONIC KIDNEY DISEASE, OR UNSPECIFIED CHRONIC KIDNEY DISEASE: ICD-10-CM

## 2022-10-24 DIAGNOSIS — K31.811 ANGIODYSPLASIA OF STOMACH AND DUODENUM WITH BLEEDING: ICD-10-CM

## 2022-10-24 DIAGNOSIS — D63.1 ANEMIA IN CHRONIC KIDNEY DISEASE: ICD-10-CM

## 2022-10-24 DIAGNOSIS — Z88.6 ALLERGY STATUS TO ANALGESIC AGENT: ICD-10-CM

## 2022-10-24 DIAGNOSIS — Z20.822 CONTACT WITH AND (SUSPECTED) EXPOSURE TO COVID-19: ICD-10-CM

## 2022-10-24 DIAGNOSIS — N18.4 CHRONIC KIDNEY DISEASE, STAGE 4 (SEVERE): ICD-10-CM

## 2022-10-24 DIAGNOSIS — Z79.899 OTHER LONG TERM (CURRENT) DRUG THERAPY: ICD-10-CM

## 2022-10-24 DIAGNOSIS — K64.1 SECOND DEGREE HEMORRHOIDS: ICD-10-CM

## 2022-10-24 DIAGNOSIS — Z28.310 UNVACCINATED FOR COVID-19: ICD-10-CM

## 2022-10-24 DIAGNOSIS — D62 ACUTE POSTHEMORRHAGIC ANEMIA: ICD-10-CM

## 2022-10-24 DIAGNOSIS — E66.9 OBESITY, UNSPECIFIED: ICD-10-CM

## 2022-10-24 DIAGNOSIS — K29.01 ACUTE GASTRITIS WITH BLEEDING: ICD-10-CM

## 2022-10-24 DIAGNOSIS — M54.30 SCIATICA, UNSPECIFIED SIDE: ICD-10-CM

## 2022-10-24 LAB
BASOPHILS # BLD AUTO: 0.01 K/UL
BASOPHILS NFR BLD AUTO: 0.2 %
EOSINOPHIL # BLD AUTO: 0.22 K/UL
EOSINOPHIL NFR BLD AUTO: 5.1 %
HCT VFR BLD CALC: 30.6 %
HGB BLD-MCNC: 9.5 G/DL
IMM GRANULOCYTES NFR BLD AUTO: 0.5 %
LYMPHOCYTES # BLD AUTO: 0.57 K/UL
LYMPHOCYTES NFR BLD AUTO: 13.3 %
MAN DIFF?: NORMAL
MCHC RBC-ENTMCNC: 29.2 PG
MCHC RBC-ENTMCNC: 31 G/DL
MCV RBC AUTO: 94.2 FL
MONOCYTES # BLD AUTO: 0.5 K/UL
MONOCYTES NFR BLD AUTO: 11.7 %
NEUTROPHILS # BLD AUTO: 2.97 K/UL
NEUTROPHILS NFR BLD AUTO: 69.2 %
PLATELET # BLD AUTO: 105 K/UL
RBC # BLD: 3.25 M/UL
RBC # FLD: 18.8 %
WBC # FLD AUTO: 4.29 K/UL

## 2022-10-24 RX ORDER — IRON SUCROSE 20 MG/ML
200 INJECTION, SOLUTION INTRAVENOUS ONCE
Refills: 0 | Status: COMPLETED | OUTPATIENT
Start: 2022-10-24 | End: 2022-10-24

## 2022-10-24 RX ORDER — ERYTHROPOIETIN 10000 [IU]/ML
30000 INJECTION, SOLUTION INTRAVENOUS; SUBCUTANEOUS ONCE
Refills: 0 | Status: COMPLETED | OUTPATIENT
Start: 2022-10-24 | End: 2022-10-24

## 2022-10-24 RX ADMIN — IRON SUCROSE 110 MILLIGRAM(S): 20 INJECTION, SOLUTION INTRAVENOUS at 12:52

## 2022-10-24 RX ADMIN — ERYTHROPOIETIN 30000 UNIT(S): 10000 INJECTION, SOLUTION INTRAVENOUS; SUBCUTANEOUS at 12:51

## 2022-10-31 ENCOUNTER — APPOINTMENT (OUTPATIENT)
Dept: HEMATOLOGY ONCOLOGY | Facility: CLINIC | Age: 77
End: 2022-10-31

## 2022-10-31 ENCOUNTER — APPOINTMENT (OUTPATIENT)
Dept: INFUSION THERAPY | Facility: CLINIC | Age: 77
End: 2022-10-31

## 2022-10-31 VITALS
OXYGEN SATURATION: 98 % | HEART RATE: 62 BPM | DIASTOLIC BLOOD PRESSURE: 62 MMHG | BODY MASS INDEX: 37.61 KG/M2 | HEIGHT: 66 IN | TEMPERATURE: 97.7 F | WEIGHT: 234 LBS | SYSTOLIC BLOOD PRESSURE: 154 MMHG | RESPIRATION RATE: 16 BRPM

## 2022-10-31 LAB
BASOPHILS # BLD AUTO: 0.02 K/UL
BASOPHILS NFR BLD AUTO: 0.6 %
EOSINOPHIL # BLD AUTO: 0.29 K/UL
EOSINOPHIL NFR BLD AUTO: 8.6 %
HCT VFR BLD CALC: 33.2 %
HGB BLD-MCNC: 10 G/DL
IMM GRANULOCYTES NFR BLD AUTO: 0.6 %
LYMPHOCYTES # BLD AUTO: 0.75 K/UL
LYMPHOCYTES NFR BLD AUTO: 22.3 %
MAN DIFF?: NORMAL
MCHC RBC-ENTMCNC: 28.9 PG
MCHC RBC-ENTMCNC: 30.1 G/DL
MCV RBC AUTO: 96 FL
MONOCYTES # BLD AUTO: 0.43 K/UL
MONOCYTES NFR BLD AUTO: 12.8 %
NEUTROPHILS # BLD AUTO: 1.86 K/UL
NEUTROPHILS NFR BLD AUTO: 55.1 %
PLATELET # BLD AUTO: 135 K/UL
RBC # BLD: 3.46 M/UL
RBC # FLD: 18.5 %
WBC # FLD AUTO: 3.37 K/UL

## 2022-10-31 PROCEDURE — 99213 OFFICE O/P EST LOW 20 MIN: CPT

## 2022-10-31 RX ORDER — ERYTHROPOIETIN 10000 [IU]/ML
30000 INJECTION, SOLUTION INTRAVENOUS; SUBCUTANEOUS ONCE
Refills: 0 | Status: COMPLETED | OUTPATIENT
Start: 2022-10-31 | End: 2022-10-31

## 2022-10-31 RX ADMIN — ERYTHROPOIETIN 30000 UNIT(S): 10000 INJECTION, SOLUTION INTRAVENOUS; SUBCUTANEOUS at 13:12

## 2022-11-01 NOTE — PHYSICAL EXAM
[Obese] : obese [Normal] : no peripheral adenopathy appreciated [de-identified] :  no acute distress.  [de-identified] : grade 3/6 systolic murmur  [de-identified] : lower extremity edema ( R > L ) 1 to 2 plus .

## 2022-11-01 NOTE — ASSESSMENT
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 ) , \par peripheral edema . \par Constipation , rectal bleeding ( hemorrhoids ? 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 Prominent mesenteric lymph nodes . PET scan from 8/9/22 no suspicious lesions\par \par s/p push enteroscopy 10/14/22 with a few small GAVE noted in antrum, APC applied with hemostasis achieved; in duodenum small AVMs seen and APC applied with hemostasis, 2 non bleeding AVMs seen in jejunum \par \par CBC reviewed Hgb >10 , bleeding likely subsided or diminished. \par Plan: Needs GI follow up (  repeat capsule endoscopy per advanced [facility] ? ) \par         Continue with 30,000 units of Retacrit weekly\par         Monitor ferritin ( = 136 on 10/1722 )\par

## 2022-11-01 NOTE — HISTORY OF PRESENT ILLNESS
[de-identified] : bruce is a 74 year old white female with hypertension, chronic kidney disease, morbidly obese presents today with normocytic anemia. \par Her most recent CBC 04/01/2020 shows WBC -5.66, HB of 6.7, MCV -86.3, RDW - 17.3, platelet count- 196. Her Hb was normal until 08/2019. In October 2019 she noticed black tarry stools and was feeling tired. She went to ER and her Hb was 5.3. She was given 3 units of PRBC. Her iron studies at that showed ferritin of 8 and percent saturation of 5. She had EGD and colonoscopy which did not reveal any bleeding lesions. Following that event as per patient she was not given iron (?not sure why). She was readmitted to hospital in 01/2020 for SOB on exertion and her HB was noted to 6.0 again. She was given 2 units and her iron studies were consistent with DARRIN. B12 and folate were normal. Immunofixation showed weak IgG lambda migrating para protein. Free light chain ratio 2.1. Normal calcium. \par EGD and VCE was done. EGD revealed gastritis and VCE showed bleeding in small bowel. \par She was started on oral iron and she took for about three months. \par Her latest ferritin done in feb 2020 was 24 and percent saturation was 72% and her hb improved to 8.8. She also has push enteroscopy in 02/2020 and no bleeding lesions were found. Was recommended to have repeat colonoscopy and double balloon enteroscopy if she bleeds again.\par She went for blood work again on 04/01/2020 as she was feeling weak and having SOB and her hb dropped to 6.7. She was told by PMD to start on PROCRIT 10,000 units M-W-F. She was told her iron levels are good and she can stop iron. \par \par Today she feels very tired and her SOB is worse. She denies any melena or BRBPR in last few days. She does have anal fissure and hemorrhoids and bleeds when she is constipated. She denies hematuria or post menopausal bleeding. Denies weight loss. Does not take any NSAIDs or aspirin. She has not followed up with nephrology before.\par Family history is significant for breast cancer in her mother. She is a former smoker stopped 20 years ago.\par She is uptodate with mammogram and Pap smear. \par  [de-identified] : 09/17/2020 Patient returns for follow up , she feels better after venofer X 4 and on EPO 10,000 weekly , Hb is up to 10 . \par \par 10/15/2020 Patient returns for follow up 2 weeks after last dose of EPO , today's Hb is 10.7 . she offers no new complaints . \par \par 04/08/2021 Patient returns for follow up for anemia on EPO and iron replacement . Hb is 10.8 . she complains of mild left arm pain after she started to self check her BP . ahe meds were recently adjusted by her PMD . \par \par 08/31/2021 patient returns for follow up , she offers no new complaints , her Hgb is slowly trending down after last transfusion and venofer X1 for ferritin : 35 . \par \par 11/18/2021 patient returns for follow up complaining of weakness and dyspnea on minimal exertion , still with lower extremity edema R > L . Hgb is 7.1 2 weeks after transfusion and despite venofer and EPO . She denies melena or hematochezia . Of note she had suspected small bowel bleeding on enteroscopy or capsule endoscopy and was intolerant to push enteroscopy ( hypotension ? ) \par 2/14/22- Patient is here for follow up visit for management of DARRIN. Hgb is 6.7  3 days only after last transfusion , she reports several episodes of bleeding presumably from hemorrhoids and is using topical medications , Ferritin levels remain low despite venofer . She complains of weakness, bilateral leg edema . no chest pain or SOB . \par \par 12/2/21: patient returns for follow up for DARRIN secondary  obscure GI bleeding  and CKD. \par She is feeling better today. We will continue  Venofer infusion weekly X 3 and Procrit.\par Close monitor CBC on weekly basis with possible blood transfusion if required. \par \par 5/19/2022 Patient returns for follow up , she lost her  last week to lung cancer . He Hgb is still 7.2  two weeks after transfusion and despite weekly venofer . Ferritin is trending down . She is noted with weight loss and complains of abdominal cramps and pain radiating from epigastric area and RUQ , radiating to the back .\par Of note CT scan from 1/2022 showed prominent adenopathy involving mesenteric and retroperitoneal lymph nodes. \par \par \par 8/8/2022 Patient returns for chronic iron deficiency anemia , she is requiring transfusion every 2 weeks . today's Hgb is 6.9 however she denies hematochezia , increased weakness , dizziness , chest pain or palpitations . She continues with mild RUQ pain radiating to the back. \par \par 9/6/22: Patient returns for chronic iron deficiency anemia. She is complaining of lightheadedness and SOB since yesterday. CBC shows hemoglobin 5.7 g/dL, platelts 124 with normal ANC. She had a port placed since she has difficult venous access and requires frequent transfusions. Her PET scan was negative for suspicious lesions. She was referred to the ED for transfusion. Denies melena or hematochezia. \par \par 9/26/22: Patient returns for followup of her DARRIN, likely due to occult GI bleed (hx of positive capsule endoscpoy, negative enteroscopy in 2020). She received 2 units in the ED on 9/6, then 1 unit 9/13, and 2 units again on the 9/20. She is feeling well this week after her most recent transfusion. She has had good energy and denies SOB, fatigue, dizziness. She had some hemorrhoidal bleeding prior to her most recent transfusion. \par \par 11/1/22: Patient returns for DARRIN due to GIB. She was admitted 10/1, received 4 units of PRBCs and 2 doses of venofer. She had a push enteroscopy which showed GAVE, APC was applied to bleeding lesions with hemostasis achieved. Additionally, there were bleeding AVMs in the duodenum which also had cautery. 2 additional sites of AVMs seen in the jejunum which were not bleeding. She had thrombocytopenia to 91. Today her hemoglobin is up to 10.0 g/dL and platelets are back to 135 with mild leukopenia. \par received 5 units of PRBCs and venofer x2\par

## 2022-11-07 ENCOUNTER — APPOINTMENT (OUTPATIENT)
Dept: INFUSION THERAPY | Facility: CLINIC | Age: 77
End: 2022-11-07

## 2022-11-07 LAB
BASOPHILS # BLD AUTO: 0.01 K/UL
BASOPHILS NFR BLD AUTO: 0.3 %
EOSINOPHIL # BLD AUTO: 0.27 K/UL
EOSINOPHIL NFR BLD AUTO: 8.8 %
HCT VFR BLD CALC: 30.4 %
HGB BLD-MCNC: 9.6 G/DL
IMM GRANULOCYTES NFR BLD AUTO: 0.3 %
LYMPHOCYTES # BLD AUTO: 0.77 K/UL
LYMPHOCYTES NFR BLD AUTO: 25.1 %
MAN DIFF?: NORMAL
MCHC RBC-ENTMCNC: 29.7 PG
MCHC RBC-ENTMCNC: 31.6 G/DL
MCV RBC AUTO: 94.1 FL
MONOCYTES # BLD AUTO: 0.44 K/UL
MONOCYTES NFR BLD AUTO: 14.3 %
NEUTROPHILS # BLD AUTO: 1.57 K/UL
NEUTROPHILS NFR BLD AUTO: 51.2 %
PLATELET # BLD AUTO: 130 K/UL
RBC # BLD: 3.23 M/UL
RBC # FLD: 17.4 %
WBC # FLD AUTO: 3.07 K/UL

## 2022-11-07 RX ORDER — ERYTHROPOIETIN 10000 [IU]/ML
30000 INJECTION, SOLUTION INTRAVENOUS; SUBCUTANEOUS ONCE
Refills: 0 | Status: COMPLETED | OUTPATIENT
Start: 2022-11-07 | End: 2022-11-07

## 2022-11-07 RX ADMIN — ERYTHROPOIETIN 30000 UNIT(S): 10000 INJECTION, SOLUTION INTRAVENOUS; SUBCUTANEOUS at 12:41

## 2022-11-08 LAB — FERRITIN SERPL-MCNC: 87 NG/ML

## 2022-11-14 ENCOUNTER — APPOINTMENT (OUTPATIENT)
Dept: INFUSION THERAPY | Facility: CLINIC | Age: 77
End: 2022-11-14

## 2022-11-14 LAB
BASOPHILS # BLD AUTO: 0.01 K/UL
BASOPHILS NFR BLD AUTO: 0.3 %
EOSINOPHIL # BLD AUTO: 0.23 K/UL
EOSINOPHIL NFR BLD AUTO: 7.7 %
HCT VFR BLD CALC: 27 %
HGB BLD-MCNC: 8.5 G/DL
IMM GRANULOCYTES NFR BLD AUTO: 1 %
LYMPHOCYTES # BLD AUTO: 0.81 K/UL
LYMPHOCYTES NFR BLD AUTO: 27.3 %
MAN DIFF?: NORMAL
MCHC RBC-ENTMCNC: 29.2 PG
MCHC RBC-ENTMCNC: 31.5 G/DL
MCV RBC AUTO: 92.8 FL
MONOCYTES # BLD AUTO: 0.39 K/UL
MONOCYTES NFR BLD AUTO: 13.1 %
NEUTROPHILS # BLD AUTO: 1.5 K/UL
NEUTROPHILS NFR BLD AUTO: 50.6 %
PLATELET # BLD AUTO: 114 K/UL
RBC # BLD: 2.91 M/UL
RBC # FLD: 16.7 %
WBC # FLD AUTO: 2.97 K/UL

## 2022-11-14 RX ORDER — ERYTHROPOIETIN 10000 [IU]/ML
30000 INJECTION, SOLUTION INTRAVENOUS; SUBCUTANEOUS ONCE
Refills: 0 | Status: COMPLETED | OUTPATIENT
Start: 2022-11-14 | End: 2022-11-14

## 2022-11-14 RX ORDER — HYDROCORTISONE 20 MG
100 TABLET ORAL ONCE
Refills: 0 | Status: COMPLETED | OUTPATIENT
Start: 2022-11-14 | End: 2022-11-14

## 2022-11-14 RX ORDER — ACETAMINOPHEN 500 MG
650 TABLET ORAL ONCE
Refills: 0 | Status: COMPLETED | OUTPATIENT
Start: 2022-11-14 | End: 2022-11-14

## 2022-11-14 RX ORDER — IRON SUCROSE 20 MG/ML
200 INJECTION, SOLUTION INTRAVENOUS ONCE
Refills: 0 | Status: COMPLETED | OUTPATIENT
Start: 2022-11-14 | End: 2022-11-14

## 2022-11-14 RX ADMIN — Medication 100 MILLIGRAM(S): at 13:44

## 2022-11-14 RX ADMIN — ERYTHROPOIETIN 30000 UNIT(S): 10000 INJECTION, SOLUTION INTRAVENOUS; SUBCUTANEOUS at 13:45

## 2022-11-14 RX ADMIN — Medication 650 MILLIGRAM(S): at 13:45

## 2022-11-14 RX ADMIN — IRON SUCROSE 110 MILLIGRAM(S): 20 INJECTION, SOLUTION INTRAVENOUS at 13:45

## 2022-11-15 LAB — FERRITIN SERPL-MCNC: 47 NG/ML

## 2022-11-21 ENCOUNTER — APPOINTMENT (OUTPATIENT)
Dept: INFUSION THERAPY | Facility: CLINIC | Age: 77
End: 2022-11-21

## 2022-11-21 ENCOUNTER — NON-APPOINTMENT (OUTPATIENT)
Age: 77
End: 2022-11-21

## 2022-11-21 DIAGNOSIS — D72.819 DECREASED WHITE BLOOD CELL COUNT, UNSPECIFIED: ICD-10-CM

## 2022-11-21 DIAGNOSIS — D69.6 THROMBOCYTOPENIA, UNSPECIFIED: ICD-10-CM

## 2022-11-21 LAB
BASOPHILS # BLD AUTO: 0 K/UL
BASOPHILS NFR BLD AUTO: 0 %
EOSINOPHIL # BLD AUTO: 0.08 K/UL
EOSINOPHIL NFR BLD AUTO: 6.6 %
HCT VFR BLD CALC: 25 %
HGB BLD-MCNC: 7.4 G/DL
IMM GRANULOCYTES NFR BLD AUTO: 0 %
LYMPHOCYTES # BLD AUTO: 0.44 K/UL
LYMPHOCYTES NFR BLD AUTO: 36.1 %
MAN DIFF?: NORMAL
MCHC RBC-ENTMCNC: 28.4 PG
MCHC RBC-ENTMCNC: 29.6 G/DL
MCV RBC AUTO: 95.8 FL
MONOCYTES # BLD AUTO: 0.17 K/UL
MONOCYTES NFR BLD AUTO: 13.9 %
NEUTROPHILS # BLD AUTO: 0.53 K/UL
NEUTROPHILS NFR BLD AUTO: 43.4 %
PLATELET # BLD AUTO: 94 K/UL
RBC # BLD: 2.61 M/UL
RBC # FLD: 17 %
WBC # FLD AUTO: 1.22 K/UL

## 2022-11-21 RX ORDER — ERYTHROPOIETIN 10000 [IU]/ML
30000 INJECTION, SOLUTION INTRAVENOUS; SUBCUTANEOUS ONCE
Refills: 0 | Status: COMPLETED | OUTPATIENT
Start: 2022-11-21 | End: 2022-12-05

## 2022-11-21 RX ORDER — IRON SUCROSE 20 MG/ML
200 INJECTION, SOLUTION INTRAVENOUS ONCE
Refills: 0 | Status: COMPLETED | OUTPATIENT
Start: 2022-11-21 | End: 2022-11-21

## 2022-11-21 RX ADMIN — ERYTHROPOIETIN 30000 UNIT(S): 10000 INJECTION, SOLUTION INTRAVENOUS; SUBCUTANEOUS at 12:59

## 2022-11-21 RX ADMIN — IRON SUCROSE 110 MILLIGRAM(S): 20 INJECTION, SOLUTION INTRAVENOUS at 12:05

## 2022-11-22 LAB
ABO + RH PNL BLD: NORMAL
BLD GP AB SCN SERPL QL: NORMAL

## 2022-11-23 ENCOUNTER — APPOINTMENT (OUTPATIENT)
Dept: INFUSION THERAPY | Facility: CLINIC | Age: 77
End: 2022-11-23

## 2022-11-23 RX ORDER — HYDROCORTISONE 20 MG
100 TABLET ORAL ONCE
Refills: 0 | Status: COMPLETED | OUTPATIENT
Start: 2022-11-23 | End: 2022-11-23

## 2022-11-23 RX ORDER — ACETAMINOPHEN 500 MG
650 TABLET ORAL ONCE
Refills: 0 | Status: COMPLETED | OUTPATIENT
Start: 2022-11-23 | End: 2022-11-23

## 2022-11-23 RX ADMIN — Medication 100 MILLIGRAM(S): at 11:13

## 2022-11-23 RX ADMIN — Medication 650 MILLIGRAM(S): at 11:13

## 2022-11-28 ENCOUNTER — APPOINTMENT (OUTPATIENT)
Dept: HEMATOLOGY ONCOLOGY | Facility: CLINIC | Age: 77
End: 2022-11-28

## 2022-11-28 ENCOUNTER — APPOINTMENT (OUTPATIENT)
Dept: INFUSION THERAPY | Facility: CLINIC | Age: 77
End: 2022-11-28

## 2022-11-28 VITALS
SYSTOLIC BLOOD PRESSURE: 137 MMHG | HEART RATE: 62 BPM | DIASTOLIC BLOOD PRESSURE: 68 MMHG | HEIGHT: 66 IN | TEMPERATURE: 98.2 F | RESPIRATION RATE: 16 BRPM | BODY MASS INDEX: 38.73 KG/M2 | WEIGHT: 241 LBS

## 2022-11-28 LAB
BASOPHILS # BLD AUTO: 0.01 K/UL
BASOPHILS NFR BLD AUTO: 0.2 %
EOSINOPHIL # BLD AUTO: 0.15 K/UL
EOSINOPHIL NFR BLD AUTO: 2.7 %
HCT VFR BLD CALC: 28.4 %
HGB BLD-MCNC: 8.8 G/DL
IMM GRANULOCYTES NFR BLD AUTO: 0.7 %
LYMPHOCYTES # BLD AUTO: 0.91 K/UL
LYMPHOCYTES NFR BLD AUTO: 16.6 %
MAN DIFF?: NORMAL
MCHC RBC-ENTMCNC: 29.1 PG
MCHC RBC-ENTMCNC: 31 G/DL
MCV RBC AUTO: 94 FL
MONOCYTES # BLD AUTO: 0.61 K/UL
MONOCYTES NFR BLD AUTO: 11.1 %
NEUTROPHILS # BLD AUTO: 3.77 K/UL
NEUTROPHILS NFR BLD AUTO: 68.7 %
PLATELET # BLD AUTO: 136 K/UL
RBC # BLD: 3.02 M/UL
RBC # FLD: 17.6 %
WBC # FLD AUTO: 5.49 K/UL

## 2022-11-28 PROCEDURE — 99214 OFFICE O/P EST MOD 30 MIN: CPT

## 2022-11-28 RX ORDER — ERYTHROPOIETIN 10000 [IU]/ML
30000 INJECTION, SOLUTION INTRAVENOUS; SUBCUTANEOUS ONCE
Refills: 0 | Status: COMPLETED | OUTPATIENT
Start: 2022-11-28 | End: 2022-11-28

## 2022-11-28 RX ORDER — IRON SUCROSE 20 MG/ML
200 INJECTION, SOLUTION INTRAVENOUS ONCE
Refills: 0 | Status: COMPLETED | OUTPATIENT
Start: 2022-11-28 | End: 2022-11-28

## 2022-11-28 RX ORDER — ACETAMINOPHEN 500 MG
650 TABLET ORAL ONCE
Refills: 0 | Status: COMPLETED | OUTPATIENT
Start: 2022-11-28 | End: 2022-11-28

## 2022-11-28 RX ORDER — HYDROCORTISONE 20 MG
100 TABLET ORAL ONCE
Refills: 0 | Status: COMPLETED | OUTPATIENT
Start: 2022-11-28 | End: 2022-11-28

## 2022-11-28 RX ADMIN — Medication 650 MILLIGRAM(S): at 13:06

## 2022-11-28 RX ADMIN — ERYTHROPOIETIN 30000 UNIT(S): 10000 INJECTION, SOLUTION INTRAVENOUS; SUBCUTANEOUS at 13:35

## 2022-11-28 RX ADMIN — Medication 100 MILLIGRAM(S): at 13:06

## 2022-11-28 RX ADMIN — IRON SUCROSE 110 MILLIGRAM(S): 20 INJECTION, SOLUTION INTRAVENOUS at 13:06

## 2022-11-29 NOTE — ASSESSMENT
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 ) , \par peripheral edema . \par Constipation , rectal bleeding ( hemorrhoids ? 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 Prominent mesenteric lymph nodes . PET scan from 8/9/22 no suspicious lesions\par \par s/p push enteroscopy 10/14/22 with a few small GAVE noted in antrum, APC applied with hemostasis achieved; in duodenum small AVMs seen and APC applied with hemostasis, 2 non bleeding AVMs seen in jejunum \par \par decreased peripheral edema , diuretics held by PMD . \par \par CBC reviewed, hg 8.8, plt 136, WBC 5.49\par \par Plan: Continue with venofer\par          Retacrit 30,000 units  \par          F/u [facility] for capsule (appt 12/12)\par          Monitor ferritin\par

## 2022-11-29 NOTE — HISTORY OF PRESENT ILLNESS
[de-identified] : bruce is a 74 year old white female with hypertension, chronic kidney disease, morbidly obese presents today with normocytic anemia. \par Her most recent CBC 04/01/2020 shows WBC -5.66, HB of 6.7, MCV -86.3, RDW - 17.3, platelet count- 196. Her Hb was normal until 08/2019. In October 2019 she noticed black tarry stools and was feeling tired. She went to ER and her Hb was 5.3. She was given 3 units of PRBC. Her iron studies at that showed ferritin of 8 and percent saturation of 5. She had EGD and colonoscopy which did not reveal any bleeding lesions. Following that event as per patient she was not given iron (?not sure why). She was readmitted to hospital in 01/2020 for SOB on exertion and her HB was noted to 6.0 again. She was given 2 units and her iron studies were consistent with DARRIN. B12 and folate were normal. Immunofixation showed weak IgG lambda migrating para protein. Free light chain ratio 2.1. Normal calcium. \par EGD and VCE was done. EGD revealed gastritis and VCE showed bleeding in small bowel. \par She was started on oral iron and she took for about three months. \par Her latest ferritin done in feb 2020 was 24 and percent saturation was 72% and her hb improved to 8.8. She also has push enteroscopy in 02/2020 and no bleeding lesions were found. Was recommended to have repeat colonoscopy and double balloon enteroscopy if she bleeds again.\par She went for blood work again on 04/01/2020 as she was feeling weak and having SOB and her hb dropped to 6.7. She was told by PMD to start on PROCRIT 10,000 units M-W-F. She was told her iron levels are good and she can stop iron. \par \par Today she feels very tired and her SOB is worse. She denies any melena or BRBPR in last few days. She does have anal fissure and hemorrhoids and bleeds when she is constipated. She denies hematuria or post menopausal bleeding. Denies weight loss. Does not take any NSAIDs or aspirin. She has not followed up with nephrology before.\par Family history is significant for breast cancer in her mother. She is a former smoker stopped 20 years ago.\par She is uptodate with mammogram and Pap smear. \par  [de-identified] : 09/17/2020 Patient returns for follow up , she feels better after venofer X 4 and on EPO 10,000 weekly , Hb is up to 10 . \par \par 10/15/2020 Patient returns for follow up 2 weeks after last dose of EPO , today's Hb is 10.7 . she offers no new complaints . \par \par 04/08/2021 Patient returns for follow up for anemia on EPO and iron replacement . Hb is 10.8 . she complains of mild left arm pain after she started to self check her BP . ahe meds were recently adjusted by her PMD . \par \par 08/31/2021 patient returns for follow up , she offers no new complaints , her Hgb is slowly trending down after last transfusion and venofer X1 for ferritin : 35 . \par \par 11/18/2021 patient returns for follow up complaining of weakness and dyspnea on minimal exertion , still with lower extremity edema R > L . Hgb is 7.1 2 weeks after transfusion and despite venofer and EPO . She denies melena or hematochezia . Of note she had suspected small bowel bleeding on enteroscopy or capsule endoscopy and was intolerant to push enteroscopy ( hypotension ? ) \par 2/14/22- Patient is here for follow up visit for management of DARRIN. Hgb is 6.7  3 days only after last transfusion , she reports several episodes of bleeding presumably from hemorrhoids and is using topical medications , Ferritin levels remain low despite venofer . She complains of weakness, bilateral leg edema . no chest pain or SOB . \par \par 12/2/21: patient returns for follow up for DARRIN secondary  obscure GI bleeding  and CKD. \par She is feeling better today. We will continue  Venofer infusion weekly X 3 and Procrit.\par Close monitor CBC on weekly basis with possible blood transfusion if required. \par \par 5/19/2022 Patient returns for follow up , she lost her  last week to lung cancer . He Hgb is still 7.2  two weeks after transfusion and despite weekly venofer . Ferritin is trending down . She is noted with weight loss and complains of abdominal cramps and pain radiating from epigastric area and RUQ , radiating to the back .\par Of note CT scan from 1/2022 showed prominent adenopathy involving mesenteric and retroperitoneal lymph nodes. \par \par \par 8/8/2022 Patient returns for chronic iron deficiency anemia , she is requiring transfusion every 2 weeks . today's Hgb is 6.9 however she denies hematochezia , increased weakness , dizziness , chest pain or palpitations . She continues with mild RUQ pain radiating to the back. \par \par 9/6/22: Patient returns for chronic iron deficiency anemia. She is complaining of lightheadedness and SOB since yesterday. CBC shows hemoglobin 5.7 g/dL, platelts 124 with normal ANC. She had a port placed since she has difficult venous access and requires frequent transfusions. Her PET scan was negative for suspicious lesions. She was referred to the ED for transfusion. Denies melena or hematochezia. \par \par 9/26/22: Patient returns for followup of her DARRIN, likely due to occult GI bleed (hx of positive capsule endoscpoy, negative enteroscopy in 2020). She received 2 units in the ED on 9/6, then 1 unit 9/13, and 2 units again on the 9/20. She is feeling well this week after her most recent transfusion. She has had good energy and denies SOB, fatigue, dizziness. She had some hemorrhoidal bleeding prior to her most recent transfusion. \par \par 11/1/22: Patient returns for DARRIN due to GIB. She was admitted 10/1, received 4 units of PRBCs and 2 doses of venofer. She had a push enteroscopy which showed GAVE, APC was applied to bleeding lesions with hemostasis achieved. Additionally, there were bleeding AVMs in the duodenum which also had cautery. 2 additional sites of AVMs seen in the jejunum which were not bleeding. She had thrombocytopenia to 91. Today her hemoglobin is up to 10.0 g/dL and platelets are back to 135 with mild leukopenia. \par received 5 units of PRBCs and venofer x2\par \par 11/28/22: Patient returns for followup. Her hemoglobin and ferritin were trending down ,received 1 unit of pRBCs. She has an appointment with GI 12/12. She thinks she had the flu about a week ago and was recently started on prednisone. Neutropenia resolved on today's CBC. \par

## 2022-11-29 NOTE — PHYSICAL EXAM
[Obese] : obese [Normal] : no peripheral adenopathy appreciated [de-identified] :  no acute distress.  [de-identified] : grade 3/6 systolic murmur  [de-identified] : lower extremity edema ( R > L ) 1 +

## 2022-11-29 NOTE — DISCHARGE NOTE PROVIDER - NSDCADMDATE_GEN_ALL_CORE_FT
How Severe Is Your Skin Lesion?: mild Is This A New Presentation, Or A Follow-Up?: Skin Lesion 16-Feb-2022 00:38

## 2022-12-05 ENCOUNTER — APPOINTMENT (OUTPATIENT)
Dept: INFUSION THERAPY | Facility: CLINIC | Age: 77
End: 2022-12-05

## 2022-12-05 LAB
BASOPHILS # BLD AUTO: 0.02 K/UL
BASOPHILS NFR BLD AUTO: 0.6 %
EOSINOPHIL # BLD AUTO: 0.05 K/UL
EOSINOPHIL NFR BLD AUTO: 1.5 %
HCT VFR BLD CALC: 30.2 %
HGB BLD-MCNC: 9 G/DL
IMM GRANULOCYTES NFR BLD AUTO: 0.6 %
LYMPHOCYTES # BLD AUTO: 0.3 K/UL
LYMPHOCYTES NFR BLD AUTO: 8.8 %
MAN DIFF?: NORMAL
MCHC RBC-ENTMCNC: 28.4 PG
MCHC RBC-ENTMCNC: 29.8 G/DL
MCV RBC AUTO: 95.3 FL
MONOCYTES # BLD AUTO: 0.13 K/UL
MONOCYTES NFR BLD AUTO: 3.8 %
NEUTROPHILS # BLD AUTO: 2.87 K/UL
NEUTROPHILS NFR BLD AUTO: 84.7 %
PLATELET # BLD AUTO: 103 K/UL
RBC # BLD: 3.17 M/UL
RBC # FLD: 18.1 %
WBC # FLD AUTO: 3.39 K/UL

## 2022-12-05 RX ORDER — IRON SUCROSE 20 MG/ML
200 INJECTION, SOLUTION INTRAVENOUS ONCE
Refills: 0 | Status: COMPLETED | OUTPATIENT
Start: 2022-12-05 | End: 2022-12-05

## 2022-12-05 RX ORDER — HYDROCORTISONE 20 MG
100 TABLET ORAL ONCE
Refills: 0 | Status: COMPLETED | OUTPATIENT
Start: 2022-12-05 | End: 2022-12-05

## 2022-12-05 RX ORDER — ACETAMINOPHEN 500 MG
650 TABLET ORAL ONCE
Refills: 0 | Status: COMPLETED | OUTPATIENT
Start: 2022-12-05 | End: 2023-02-09

## 2022-12-05 RX ORDER — ERYTHROPOIETIN 10000 [IU]/ML
30000 INJECTION, SOLUTION INTRAVENOUS; SUBCUTANEOUS ONCE
Refills: 0 | Status: COMPLETED | OUTPATIENT
Start: 2022-12-05 | End: 2023-01-19

## 2022-12-05 RX ADMIN — ERYTHROPOIETIN 30000 UNIT(S): 10000 INJECTION, SOLUTION INTRAVENOUS; SUBCUTANEOUS at 13:22

## 2022-12-05 RX ADMIN — Medication 100 MILLIGRAM(S): at 13:23

## 2022-12-05 RX ADMIN — IRON SUCROSE 110 MILLIGRAM(S): 20 INJECTION, SOLUTION INTRAVENOUS at 13:45

## 2022-12-12 ENCOUNTER — APPOINTMENT (OUTPATIENT)
Dept: GASTROENTEROLOGY | Facility: CLINIC | Age: 77
End: 2022-12-12

## 2022-12-14 ENCOUNTER — APPOINTMENT (OUTPATIENT)
Dept: INFUSION THERAPY | Facility: CLINIC | Age: 77
End: 2022-12-14

## 2022-12-14 LAB
BASOPHILS # BLD AUTO: 0.01 K/UL
BASOPHILS NFR BLD AUTO: 0.3 %
EOSINOPHIL # BLD AUTO: 0.2 K/UL
EOSINOPHIL NFR BLD AUTO: 5.9 %
HCT VFR BLD CALC: 27.4 %
HGB BLD-MCNC: 8 G/DL
IMM GRANULOCYTES NFR BLD AUTO: 1.2 %
LYMPHOCYTES # BLD AUTO: 0.66 K/UL
LYMPHOCYTES NFR BLD AUTO: 19.5 %
MAN DIFF?: NORMAL
MCHC RBC-ENTMCNC: 28.6 PG
MCHC RBC-ENTMCNC: 29.2 G/DL
MCV RBC AUTO: 97.9 FL
MONOCYTES # BLD AUTO: 0.38 K/UL
MONOCYTES NFR BLD AUTO: 11.2 %
NEUTROPHILS # BLD AUTO: 2.09 K/UL
NEUTROPHILS NFR BLD AUTO: 61.9 %
PLATELET # BLD AUTO: 67 K/UL
RBC # BLD: 2.8 M/UL
RBC # FLD: 17.8 %
WBC # FLD AUTO: 3.38 K/UL

## 2022-12-14 RX ORDER — ERYTHROPOIETIN 10000 [IU]/ML
30000 INJECTION, SOLUTION INTRAVENOUS; SUBCUTANEOUS ONCE
Refills: 0 | Status: COMPLETED | OUTPATIENT
Start: 2022-12-14 | End: 2022-12-14

## 2022-12-14 RX ADMIN — ERYTHROPOIETIN 30000 UNIT(S): 10000 INJECTION, SOLUTION INTRAVENOUS; SUBCUTANEOUS at 14:25

## 2022-12-14 NOTE — H&P ADULT - NSICDXPASTMEDICALHX_GEN_ALL_CORE_FT
Patient was told that the orders would need to be placed in order to schedule labs far out in advance and he will be contacted in due time if labs were needed in June.    PAST MEDICAL HISTORY:  Anemia     Eczema     Heart murmur     HTN (hypertension)

## 2022-12-19 ENCOUNTER — APPOINTMENT (OUTPATIENT)
Dept: INFUSION THERAPY | Facility: CLINIC | Age: 77
End: 2022-12-19

## 2022-12-19 LAB
ABO + RH PNL BLD: NORMAL
BASOPHILS # BLD AUTO: 0.02 K/UL
BASOPHILS NFR BLD AUTO: 0.7 %
BLD GP AB SCN SERPL QL: NORMAL
EOSINOPHIL # BLD AUTO: 0.15 K/UL
EOSINOPHIL NFR BLD AUTO: 5 %
HCT VFR BLD CALC: 21.6 %
HGB BLD-MCNC: 6.2 G/DL
IMM GRANULOCYTES NFR BLD AUTO: 0.7 %
IRON SERPL-MCNC: 26 UG/DL
LYMPHOCYTES # BLD AUTO: 0.51 K/UL
LYMPHOCYTES NFR BLD AUTO: 16.9 %
MAN DIFF?: NORMAL
MCHC RBC-ENTMCNC: 28.1 PG
MCHC RBC-ENTMCNC: 28.7 G/DL
MCV RBC AUTO: 97.7 FL
MONOCYTES # BLD AUTO: 0.29 K/UL
MONOCYTES NFR BLD AUTO: 9.6 %
NEUTROPHILS # BLD AUTO: 2.03 K/UL
NEUTROPHILS NFR BLD AUTO: 67.1 %
PLATELET # BLD AUTO: 138 K/UL
RBC # BLD: 2.21 M/UL
RBC # FLD: 17 %
WBC # FLD AUTO: 3.02 K/UL

## 2022-12-19 RX ORDER — IRON SUCROSE 20 MG/ML
200 INJECTION, SOLUTION INTRAVENOUS ONCE
Refills: 0 | Status: COMPLETED | OUTPATIENT
Start: 2022-12-19 | End: 2022-12-19

## 2022-12-19 RX ORDER — HYDROCORTISONE 20 MG
100 TABLET ORAL ONCE
Refills: 0 | Status: COMPLETED | OUTPATIENT
Start: 2022-12-19 | End: 2022-12-19

## 2022-12-19 RX ORDER — ERYTHROPOIETIN 10000 [IU]/ML
30000 INJECTION, SOLUTION INTRAVENOUS; SUBCUTANEOUS ONCE
Refills: 0 | Status: COMPLETED | OUTPATIENT
Start: 2022-12-19 | End: 2022-12-19

## 2022-12-19 RX ORDER — ACETAMINOPHEN 500 MG
650 TABLET ORAL ONCE
Refills: 0 | Status: COMPLETED | OUTPATIENT
Start: 2022-12-19 | End: 2022-12-19

## 2022-12-19 RX ADMIN — Medication 650 MILLIGRAM(S): at 13:50

## 2022-12-19 RX ADMIN — ERYTHROPOIETIN 30000 UNIT(S): 10000 INJECTION, SOLUTION INTRAVENOUS; SUBCUTANEOUS at 13:51

## 2022-12-19 RX ADMIN — Medication 100 MILLIGRAM(S): at 13:50

## 2022-12-19 RX ADMIN — IRON SUCROSE 110 MILLIGRAM(S): 20 INJECTION, SOLUTION INTRAVENOUS at 13:50

## 2022-12-20 LAB — FERRITIN SERPL-MCNC: 55 NG/ML

## 2022-12-22 ENCOUNTER — APPOINTMENT (OUTPATIENT)
Dept: INFUSION THERAPY | Facility: CLINIC | Age: 77
End: 2022-12-22

## 2022-12-22 RX ORDER — HYDROCORTISONE 20 MG
100 TABLET ORAL ONCE
Refills: 0 | Status: COMPLETED | OUTPATIENT
Start: 2022-12-22 | End: 2022-12-22

## 2022-12-22 RX ORDER — ACETAMINOPHEN 500 MG
650 TABLET ORAL ONCE
Refills: 0 | Status: COMPLETED | OUTPATIENT
Start: 2022-12-22 | End: 2022-12-22

## 2022-12-22 RX ADMIN — Medication 100 MILLIGRAM(S): at 14:30

## 2022-12-22 RX ADMIN — Medication 650 MILLIGRAM(S): at 14:30

## 2022-12-27 ENCOUNTER — APPOINTMENT (OUTPATIENT)
Dept: HEMATOLOGY ONCOLOGY | Facility: CLINIC | Age: 77
End: 2022-12-27

## 2022-12-28 ENCOUNTER — APPOINTMENT (OUTPATIENT)
Dept: INFUSION THERAPY | Facility: CLINIC | Age: 77
End: 2022-12-28

## 2022-12-28 LAB
BASOPHILS # BLD AUTO: 0.01 K/UL
BASOPHILS NFR BLD AUTO: 0.4 %
EOSINOPHIL # BLD AUTO: 0.11 K/UL
EOSINOPHIL NFR BLD AUTO: 4.7 %
HCT VFR BLD CALC: 19.9 %
HGB BLD-MCNC: 6 G/DL
IMM GRANULOCYTES NFR BLD AUTO: 0.4 %
LYMPHOCYTES # BLD AUTO: 0.49 K/UL
LYMPHOCYTES NFR BLD AUTO: 21.1 %
MAN DIFF?: NORMAL
MCHC RBC-ENTMCNC: 29 PG
MCHC RBC-ENTMCNC: 30.2 G/DL
MCV RBC AUTO: 96.1 FL
MONOCYTES # BLD AUTO: 0.28 K/UL
MONOCYTES NFR BLD AUTO: 12.1 %
NEUTROPHILS # BLD AUTO: 1.42 K/UL
NEUTROPHILS NFR BLD AUTO: 61.3 %
PLATELET # BLD AUTO: 99 K/UL
RBC # BLD: 2.07 M/UL
RBC # FLD: 16.8 %
WBC # FLD AUTO: 2.32 K/UL

## 2022-12-28 RX ORDER — ERYTHROPOIETIN 10000 [IU]/ML
30000 INJECTION, SOLUTION INTRAVENOUS; SUBCUTANEOUS ONCE
Refills: 0 | Status: COMPLETED | OUTPATIENT
Start: 2022-12-28 | End: 2022-12-28

## 2022-12-28 RX ADMIN — ERYTHROPOIETIN 30000 UNIT(S): 10000 INJECTION, SOLUTION INTRAVENOUS; SUBCUTANEOUS at 13:52

## 2022-12-29 LAB
ABO + RH PNL BLD: NORMAL
BLD GP AB SCN SERPL QL: NORMAL

## 2022-12-30 ENCOUNTER — APPOINTMENT (OUTPATIENT)
Dept: INFUSION THERAPY | Facility: CLINIC | Age: 77
End: 2022-12-30

## 2023-01-05 ENCOUNTER — APPOINTMENT (OUTPATIENT)
Dept: INFUSION THERAPY | Facility: CLINIC | Age: 78
End: 2023-01-05

## 2023-01-05 ENCOUNTER — INPATIENT (INPATIENT)
Facility: HOSPITAL | Age: 78
LOS: 3 days | Discharge: HOME | End: 2023-01-09
Attending: HOSPITALIST | Admitting: HOSPITALIST
Payer: MEDICARE

## 2023-01-05 VITALS
WEIGHT: 240.08 LBS | SYSTOLIC BLOOD PRESSURE: 124 MMHG | HEART RATE: 79 BPM | OXYGEN SATURATION: 100 % | RESPIRATION RATE: 20 BRPM | DIASTOLIC BLOOD PRESSURE: 56 MMHG | TEMPERATURE: 97 F

## 2023-01-05 DIAGNOSIS — Z87.19 PERSONAL HISTORY OF OTHER DISEASES OF THE DIGESTIVE SYSTEM: Chronic | ICD-10-CM

## 2023-01-05 LAB
ALBUMIN SERPL ELPH-MCNC: 3.2 G/DL — LOW (ref 3.5–5.2)
ALP SERPL-CCNC: 71 U/L — SIGNIFICANT CHANGE UP (ref 30–115)
ALT FLD-CCNC: <5 U/L — SIGNIFICANT CHANGE UP (ref 0–41)
ANION GAP SERPL CALC-SCNC: 8 MMOL/L — SIGNIFICANT CHANGE UP (ref 7–14)
ANISOCYTOSIS BLD QL: SIGNIFICANT CHANGE UP
AST SERPL-CCNC: 11 U/L — SIGNIFICANT CHANGE UP (ref 0–41)
BASOPHILS # BLD AUTO: 0 K/UL — SIGNIFICANT CHANGE UP (ref 0–0.2)
BASOPHILS NFR BLD AUTO: 0 % — SIGNIFICANT CHANGE UP (ref 0–1)
BILIRUB SERPL-MCNC: 0.5 MG/DL — SIGNIFICANT CHANGE UP (ref 0.2–1.2)
BLD GP AB SCN SERPL QL: SIGNIFICANT CHANGE UP
BUN SERPL-MCNC: 49 MG/DL — HIGH (ref 10–20)
CALCIUM SERPL-MCNC: 8 MG/DL — LOW (ref 8.4–10.5)
CHLORIDE SERPL-SCNC: 115 MMOL/L — HIGH (ref 98–110)
CO2 SERPL-SCNC: 17 MMOL/L — SIGNIFICANT CHANGE UP (ref 17–32)
CREAT SERPL-MCNC: 2.2 MG/DL — HIGH (ref 0.7–1.5)
EGFR: 22 ML/MIN/1.73M2 — LOW
EOSINOPHIL # BLD AUTO: 0.01 K/UL — SIGNIFICANT CHANGE UP (ref 0–0.7)
EOSINOPHIL NFR BLD AUTO: 0.9 % — SIGNIFICANT CHANGE UP (ref 0–8)
GIANT PLATELETS BLD QL SMEAR: PRESENT — SIGNIFICANT CHANGE UP
GLUCOSE SERPL-MCNC: 143 MG/DL — HIGH (ref 70–99)
HCT VFR BLD CALC: 18.9 % — LOW (ref 37–47)
HGB BLD-MCNC: 5.6 G/DL — CRITICAL LOW (ref 12–16)
LYMPHOCYTES # BLD AUTO: 0.12 K/UL — LOW (ref 1.2–3.4)
LYMPHOCYTES # BLD AUTO: 7.8 % — LOW (ref 20.5–51.1)
MANUAL SMEAR VERIFICATION: SIGNIFICANT CHANGE UP
MCHC RBC-ENTMCNC: 28.4 PG — SIGNIFICANT CHANGE UP (ref 27–31)
MCHC RBC-ENTMCNC: 29.6 G/DL — LOW (ref 32–37)
MCV RBC AUTO: 95.9 FL — SIGNIFICANT CHANGE UP (ref 81–99)
METAMYELOCYTES # FLD: 2.6 % — HIGH (ref 0–0)
MICROCYTES BLD QL: SIGNIFICANT CHANGE UP
MONOCYTES # BLD AUTO: 0 K/UL — LOW (ref 0.1–0.6)
MONOCYTES NFR BLD AUTO: 0 % — LOW (ref 1.7–9.3)
NEUTROPHILS # BLD AUTO: 1.41 K/UL — SIGNIFICANT CHANGE UP (ref 1.4–6.5)
NEUTROPHILS NFR BLD AUTO: 88.7 % — HIGH (ref 42.2–75.2)
NRBC # BLD: 1 /100 — HIGH (ref 0–0)
NRBC # BLD: SIGNIFICANT CHANGE UP /100 WBCS (ref 0–0)
PLAT MORPH BLD: NORMAL — SIGNIFICANT CHANGE UP
PLATELET # BLD AUTO: 97 K/UL — LOW (ref 130–400)
POIKILOCYTOSIS BLD QL AUTO: SIGNIFICANT CHANGE UP
POLYCHROMASIA BLD QL SMEAR: SIGNIFICANT CHANGE UP
POTASSIUM SERPL-MCNC: 5.2 MMOL/L — HIGH (ref 3.5–5)
POTASSIUM SERPL-SCNC: 5.2 MMOL/L — HIGH (ref 3.5–5)
PROT SERPL-MCNC: 5.4 G/DL — LOW (ref 6–8)
RBC # BLD: 1.97 M/UL — LOW (ref 4.2–5.4)
RBC # FLD: 16.6 % — HIGH (ref 11.5–14.5)
RBC BLD AUTO: ABNORMAL
SARS-COV-2 RNA SPEC QL NAA+PROBE: DETECTED
SODIUM SERPL-SCNC: 140 MMOL/L — SIGNIFICANT CHANGE UP (ref 135–146)
WBC # BLD: 1.59 K/UL — LOW (ref 4.8–10.8)
WBC # FLD AUTO: 1.59 K/UL — LOW (ref 4.8–10.8)

## 2023-01-05 PROCEDURE — 99285 EMERGENCY DEPT VISIT HI MDM: CPT

## 2023-01-05 RX ORDER — ACETAMINOPHEN 500 MG
650 TABLET ORAL ONCE
Refills: 0 | Status: COMPLETED | OUTPATIENT
Start: 2023-01-05 | End: 2023-01-05

## 2023-01-05 RX ORDER — PANTOPRAZOLE SODIUM 20 MG/1
8 TABLET, DELAYED RELEASE ORAL
Qty: 80 | Refills: 0 | Status: DISCONTINUED | OUTPATIENT
Start: 2023-01-05 | End: 2023-01-09

## 2023-01-05 RX ORDER — IRON SUCROSE 20 MG/ML
200 INJECTION, SOLUTION INTRAVENOUS ONCE
Refills: 0 | Status: COMPLETED | OUTPATIENT
Start: 2023-01-05 | End: 2023-01-05

## 2023-01-05 RX ORDER — PANTOPRAZOLE SODIUM 20 MG/1
80 TABLET, DELAYED RELEASE ORAL ONCE
Refills: 0 | Status: COMPLETED | OUTPATIENT
Start: 2023-01-05 | End: 2023-01-05

## 2023-01-05 RX ORDER — INFLUENZA VIRUS VACCINE 15; 15; 15; 15 UG/.5ML; UG/.5ML; UG/.5ML; UG/.5ML
0.7 SUSPENSION INTRAMUSCULAR ONCE
Refills: 0 | Status: DISCONTINUED | OUTPATIENT
Start: 2023-01-05 | End: 2023-01-09

## 2023-01-05 RX ORDER — ERYTHROPOIETIN 10000 [IU]/ML
30000 INJECTION, SOLUTION INTRAVENOUS; SUBCUTANEOUS ONCE
Refills: 0 | Status: COMPLETED | OUTPATIENT
Start: 2023-01-05 | End: 2023-01-05

## 2023-01-05 RX ORDER — HYDROCORTISONE 20 MG
100 TABLET ORAL ONCE
Refills: 0 | Status: COMPLETED | OUTPATIENT
Start: 2023-01-05 | End: 2023-01-05

## 2023-01-05 RX ADMIN — PANTOPRAZOLE SODIUM 10 MG/HR: 20 TABLET, DELAYED RELEASE ORAL at 20:03

## 2023-01-05 RX ADMIN — PANTOPRAZOLE SODIUM 80 MILLIGRAM(S): 20 TABLET, DELAYED RELEASE ORAL at 20:03

## 2023-01-05 RX ADMIN — IRON SUCROSE 110 MILLIGRAM(S): 20 INJECTION, SOLUTION INTRAVENOUS at 15:40

## 2023-01-05 RX ADMIN — Medication 650 MILLIGRAM(S): at 15:40

## 2023-01-05 RX ADMIN — ERYTHROPOIETIN 30000 UNIT(S): 10000 INJECTION, SOLUTION INTRAVENOUS; SUBCUTANEOUS at 16:17

## 2023-01-05 RX ADMIN — Medication 100 MILLIGRAM(S): at 15:40

## 2023-01-05 NOTE — PATIENT PROFILE ADULT - FALL HARM RISK - HARM RISK INTERVENTIONS

## 2023-01-05 NOTE — ED PROVIDER NOTE - OBJECTIVE STATEMENT
76 y/o F with 78 y/o F with PMH chronic anemia 2/2 GI bleed 2/2 gastric and duodenal AVMs, CKD, HTN, aortic stenosis sent to ED by Dr. Gaston for low hemoglobin, 5.8 from approx 8. Endorses SOB for past few days. Endorses dark stool. Previously on PO iron but started infusions 3 months ago instead. Admitted to hospital in Oct 2022 for anemia 2/2 GI bleed and had capsule endoscopy done. Has not seen GI outpatient yet. Denies weakness, chest pain, cough, fever

## 2023-01-05 NOTE — ED ADULT NURSE REASSESSMENT NOTE - NS ED NURSE REASSESS COMMENT FT1
order noted for 2 units prbcs upon shift. blood bank contacted multiple times, as of 2100, no prbc units ready.

## 2023-01-05 NOTE — H&P ADULT - ASSESSMENT
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 Patient was noted to have Hgb of 5.8 on outpatient labs and was sent by her oncologist for evaluation. Patient endorses SOB and cough for the past 4 days and occasional dark stool. Admitted for anemia.    #Acute on Chronic Anemia  #hx of DARRIN on Retacrit weekly and frequent iron transfusions  - follows Dr. Gaston oncologist for DARRIN  - Hgb 5.6, MCV 95.7 on admission, Plt 97  - s/p 2 units pRBC  - started on Protonix drip  - NPO  - GI eval for endoscopic intervention in the AM    #COVID positive  - CXR clear  - asymptomatic, on room air  - check inflammatory markers  - RDV x5 days    #Severe AS  - TTE 02/22 -> EF 74%, severe AS  - cardiologist Dr Munir Paula  - TAVR might be considered outpatient - but would need bleeding controlled first as there is a brief need for a/c post-TAVR    #CKD 4  - Cr 2.2, at baseline  - monitor BMP    Misc:  DVT ppx: hold AC  GI ppx: PPI drip  Diet: NPO  Activity: IAT  Code: Full Code  Dispo: Acute, tele 76 year old woman with PMHx of Severe AS, Anemia 2/2 obscure GI bleeding (from Duodenum and Gastric AVM capsule endoscopy 02/2022 and enteroscopy 10/14 found to have 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 Patient was noted to have Hgb of 5.8 on outpatient labs and was sent by her oncologist for evaluation. Patient endorses SOB and cough for the past 4 days and occasional dark stool. Admitted for anemia.    #Acute on Chronic Anemia  #hx of DARRIN on Retacrit weekly and frequent iron transfusions  - follows Dr. Gaston oncologist for DARRIN  - Hgb 5.6, MCV 95.7 on admission, Plt 97  - s/p 2 units pRBC  - started on Protonix drip  - NPO  - GI eval for endoscopic intervention in the AM    #COVID positive  - asymptomatic, on room air  - check inflammatory markers  - RDV x5 days    #Severe AS  - TTE 02/22 -> EF 74%, severe AS  - cardiologist Dr Munir Paula  - TAVR might be considered outpatient - but would need bleeding controlled first as there is a brief need for a/c post-TAVR    #CKD 4  - Cr 2.2, at baseline  - monitor BMP    Misc:  DVT ppx: hold AC  GI ppx: PPI drip  Diet: NPO  Activity: IAT  Code: Full Code  Dispo: Acute, tele 76 year old woman with PMHx of Severe AS, Anemia 2/2 obscure GI bleeding (from Duodenum and Gastric AVM capsule endoscopy 02/2022 and enteroscopy 10/14 found to have 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 Patient was noted to have Hgb of 5.8 on outpatient labs and was sent by her oncologist for evaluation. Patient endorses SOB and cough for the past 4 days and occasional dark stool. Admitted for anemia.    #Acute on Chronic Anemia  #hx of DARRIN on Retacrit weekly and frequent iron transfusions  - follows Dr. Gaston oncologist for DARRIN  - Hgb 5.6, MCV 95.7 on admission, Plt 97  - s/p 2 units pRBC  - refused OLEKSANDR  - started on Protonix drip  - NPO  - GI eval for endoscopic intervention in the AM    #COVID positive  - asymptomatic, on room air  - check inflammatory markers  - RDV x5 days    #Severe AS  - TTE 02/22 -> EF 74%, severe AS  - cardiologist Dr Munir Paula  - TAVR might be considered outpatient - but would need bleeding controlled first as there is a brief need for a/c post-TAVR    #CKD 4  - Cr 2.2, at baseline  - monitor BMP    Misc:  DVT ppx: hold AC  GI ppx: PPI drip  Diet: NPO  Activity: IAT  Code: Full Code  Dispo: Acute, tele

## 2023-01-05 NOTE — PATIENT PROFILE ADULT - FUNCTIONAL ASSESSMENT - BASIC MOBILITY 2.
Jennie Waller Manisha Bardales [facility]. [phone]        [facility] Restricted staff Jelena ([phone] / [phone])    Please let me know if there is any other information that you need.    4 = No assist / stand by assistance

## 2023-01-05 NOTE — H&P ADULT - ATTENDING COMMENTS
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 She received 2 RBC in the ED.     PHYSICAL EXAM:  GENERAL: NAD, well-developed.  HEAD:  Atraumatic, Normocephalic.  EYES: EOMI, PERRLA, conjunctiva and sclera clear.  NECK: Supple, No JVD.  CHEST/LUNG: Clear to auscultation bilaterally; No wheeze.  HEART: Regular rate and rhythm; S1 S2. SM 3/6 on left sternum.   ABDOMEN: Soft, Nontender, Nondistended; Bowel sounds present.  EXTREMITIES:  2+ Peripheral Pulses, No clubbing, cyanosis, or edema.  PSYCH: AAOx3.  NEUROLOGY: non-focal.  SKIN: No rashes or lesions.    Acute on Chronic Anemia: Due to blood loss from GI bleeding.   History of GI tract AVMs s/p   Melena:   Patient with multiple GI procedures  Last Enteroscopy 10/14/22   Angioectasias in the antrum. Multiple small AVMs in duodenal bulb and second part of duodenum. 2 small non-bleeding AVMs were noted in proximal jejunum.  GI consult.   Continue PPI.     COVID positive  Asymptomatic, on room air  Check inflammatory markers  Continue Remdesivir.     Severe Aortic Stenosis  TTE 02/22 -> EF 74%, severe AS  Cardiology follow up outpatient.     CKD 4   Cr 2.2, at baseline  - monitor BMP

## 2023-01-05 NOTE — H&P ADULT - NSHPPHYSICALEXAM_GEN_ALL_CORE
GENERAL: NAD, lying in bed comfortably  HEAD:  Atraumatic, normocephalic  EYES: EOMI, PERRLA, conjunctiva and sclera clear  ENT: Moist mucous membranes  NECK: Supple, no JVD  HEART: Regular rate and rhythm, no murmurs, rubs, or gallops  LUNGS: Unlabored respirations.  Clear to auscultation bilaterally, no crackles, wheezing, or rhonchi  ABDOMEN: Soft, nontender, nondistended, +BS  EXTREMITIES: 2+ peripheral pulses bilaterally. No clubbing, cyanosis, or edema  NERVOUS SYSTEM:  A&Ox3, no focal deficits   SKIN: No rashes or lesions GENERAL: NAD, lying in bed comfortably  HEAD:  Atraumatic, normocephalic  EYES: EOMI, PERRLA, conjunctiva and sclera clear  ENT: Moist mucous membranes  NECK: Supple, no JVD  HEART: Regular rate and rhythm, no murmurs, rubs, or gallops  LUNGS: Unlabored respirations.  Clear to auscultation bilaterally, no crackles, wheezing, or rhonchi  ABDOMEN: Soft, mild tenderness in LUQ, nondistended, +BS  EXTREMITIES: 2+ peripheral pulses bilaterally. No clubbing, cyanosis, or edema  NERVOUS SYSTEM:  A&Ox3, no focal deficits   SKIN: No rashes or lesions, port present on left chest wall GENERAL: NAD, lying in bed comfortably  HEAD:  Atraumatic, normocephalic  EYES: EOMI, PERRLA, conjunctiva and sclera clear  ENT: Moist mucous membranes  NECK: Supple, no JVD  HEART: Regular rate and rhythm, no murmurs, rubs, or gallops  LUNGS: Unlabored respirations.  Clear to auscultation bilaterally, no crackles, wheezing, or rhonchi  ABDOMEN: Soft, mild tenderness in LUQ, nondistended, +BS, refused OLEKSANDR  EXTREMITIES: 2+ peripheral pulses bilaterally. No clubbing, cyanosis, or edema  NERVOUS SYSTEM:  A&Ox3, no focal deficits   SKIN: No rashes or lesions, port present on left chest wall

## 2023-01-05 NOTE — ED PROVIDER NOTE - CLINICAL SUMMARY MEDICAL DECISION MAKING FREE TEXT BOX
27-year-old female with a history of hypertension, CKD, aortic stenosis, history of anemia due to GI bleed secondary to duodenal AVM + gastric telangiectasias (admitted Oct 2022 s/p capsule endoscopy/enteroscopy) who presents to the ED for low hemoglobin drawn outpatient.  She has been on iron infusions for the past 3 months, denies p.o. iron.  Associated with shortness of breath.  No lightheadedness, chest pain.  Does endorse melena.  Vitals noted, triage note reviewed, exam as above.  EKG, labs and imaging personally reviewed. H/H 5.6/18.9. Consent obtained and patient was given PRBC, Protonix.  Discussed with GI service who plans to perform EGD tomorrow.  Patient was updated at the bedside and admitted for further management.

## 2023-01-05 NOTE — ED PROVIDER NOTE - PHYSICAL EXAMINATION
CONSTITUTIONAL: Well-developed; well-nourished; in no acute distress.   SKIN: Warm, dry  HEAD: Normocephalic; atraumatic  EYES: PERRL, EOMI, normal sclera. Conjunctival pallor.  ENT: No nasal discharge; airway clear.  CARD:  Regular rate and rhythm. Normal S1, S2. 2+ radial pulses. Normal capillary refill.  RESP: No increased WOB. CTA b/l without wheezes, crackles, rhonchi  ABD: Normoactive BS. Soft, nontender, nondistended.  EXT: Normal ROM.   NEURO: Alert, oriented, grossly unremarkable  PSYCH: Cooperative, appropriate.

## 2023-01-05 NOTE — H&P ADULT - HISTORY OF PRESENT ILLNESS
76 year old woman with PMHx of Severe AS, Anemia Likely Secondary to obscure GI bleeding (from Duodenum and Gastric AVM per recent capsule endoscopy 02/2022 after presenting with melena), DARRIN (follows with Dr Gaston) presents to ED for low hemoglobin. Patient was noted to have Hgb of 5.8 on outpatient labs and was sent by her oncologist for evaluation. Patient endorses shortness of breath and dark stool.     ED vitals:  /56, HR 79, Temp 96.6F, satting 100%   Labs: WBC 1.56, Hgb 5.6, Plt 97, COVID positive    s/p 2 units pRBC  Admitted for acute symptomatic anemia. 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 Patient was noted to have Hgb of 5.8 on outpatient labs and was sent by her oncologist for evaluation. Patient endorses SOB and cough for the past 4 days, saw her outpatient doctor and was prescribed prednisone 20mg tablets that she only took for 2 days. Patient also endorsed semi-formed dark stools occasionally. Of note, patient has history of GI bleeding with enteroscopy performed 10/14 found to have small bleeding diffuse angioectasias in stomach and multiple small AVMs in the duodenal bulb and proximal jejunum. Denies any headache, fevers, chills, chest pain, N/V/D/C.     ED vitals:  /56, HR 79, Temp 96.6F, satting 100%   Labs: WBC 1.56, Hgb 5.6, Plt 97, COVID positive    s/p 2 units pRBC. Started on Protonix drip  Admitted for acute symptomatic anemia.

## 2023-01-05 NOTE — ED ADULT NURSE REASSESSMENT NOTE - NS ED NURSE REASSESS COMMENT FT1
pt states AC is uncomfortable spot for IV, would like placement elsewhere. Flushing with no resistance, positive blood return, no swelling/inflamation noted. MD Hernadez made aware, MD states use port for PRBC and pause protonix. MD states she will place additional US access on 3C in patient room. Nurse yaritza made aware of situation in nurse hand off.

## 2023-01-06 ENCOUNTER — APPOINTMENT (OUTPATIENT)
Dept: INFUSION THERAPY | Facility: CLINIC | Age: 78
End: 2023-01-06

## 2023-01-06 LAB
ALBUMIN SERPL ELPH-MCNC: 3.4 G/DL — LOW (ref 3.5–5.2)
ALP SERPL-CCNC: 70 U/L — SIGNIFICANT CHANGE UP (ref 30–115)
ALT FLD-CCNC: 5 U/L — SIGNIFICANT CHANGE UP (ref 0–41)
ANION GAP SERPL CALC-SCNC: 10 MMOL/L — SIGNIFICANT CHANGE UP (ref 7–14)
APTT BLD: 28.9 SEC — SIGNIFICANT CHANGE UP (ref 27–39.2)
AST SERPL-CCNC: 11 U/L — SIGNIFICANT CHANGE UP (ref 0–41)
BASOPHILS # BLD AUTO: 0 K/UL
BASOPHILS # BLD AUTO: 0.01 K/UL — SIGNIFICANT CHANGE UP (ref 0–0.2)
BASOPHILS NFR BLD AUTO: 0 %
BASOPHILS NFR BLD AUTO: 0.4 % — SIGNIFICANT CHANGE UP (ref 0–1)
BILIRUB SERPL-MCNC: 0.7 MG/DL — SIGNIFICANT CHANGE UP (ref 0.2–1.2)
BUN SERPL-MCNC: 52 MG/DL — HIGH (ref 10–20)
CALCIUM SERPL-MCNC: 8.4 MG/DL — SIGNIFICANT CHANGE UP (ref 8.4–10.5)
CHLORIDE SERPL-SCNC: 115 MMOL/L — HIGH (ref 98–110)
CO2 SERPL-SCNC: 19 MMOL/L — SIGNIFICANT CHANGE UP (ref 17–32)
CREAT SERPL-MCNC: 2.4 MG/DL — HIGH (ref 0.7–1.5)
CRP SERPL-MCNC: 6.4 MG/L — HIGH
D DIMER BLD IA.RAPID-MCNC: 274 NG/ML DDU — HIGH
EGFR: 20 ML/MIN/1.73M2 — LOW
EOSINOPHIL # BLD AUTO: 0.01 K/UL
EOSINOPHIL # BLD AUTO: 0.02 K/UL — SIGNIFICANT CHANGE UP (ref 0–0.7)
EOSINOPHIL NFR BLD AUTO: 0.5 %
EOSINOPHIL NFR BLD AUTO: 0.7 % — SIGNIFICANT CHANGE UP (ref 0–8)
GLUCOSE SERPL-MCNC: 100 MG/DL — HIGH (ref 70–99)
HCT VFR BLD CALC: 19.4 %
HCT VFR BLD CALC: 25.1 % — LOW (ref 37–47)
HGB BLD-MCNC: 5.8 G/DL
HGB BLD-MCNC: 7.6 G/DL — LOW (ref 12–16)
IMM GRANULOCYTES NFR BLD AUTO: 1.1 % — HIGH (ref 0.1–0.3)
IMM GRANULOCYTES NFR BLD AUTO: 1.5 %
INR BLD: 0.97 RATIO — SIGNIFICANT CHANGE UP (ref 0.65–1.3)
IRON SATN MFR SERPL: 240 UG/DL — HIGH (ref 35–150)
IRON SATN MFR SERPL: 70 % — HIGH (ref 15–50)
LYMPHOCYTES # BLD AUTO: 0.27 K/UL
LYMPHOCYTES # BLD AUTO: 0.47 K/UL — LOW (ref 1.2–3.4)
LYMPHOCYTES # BLD AUTO: 17.4 % — LOW (ref 20.5–51.1)
LYMPHOCYTES NFR BLD AUTO: 13.7 %
MAGNESIUM SERPL-MCNC: 2.4 MG/DL — SIGNIFICANT CHANGE UP (ref 1.8–2.4)
MAN DIFF?: NORMAL
MCHC RBC-ENTMCNC: 28.5 PG — SIGNIFICANT CHANGE UP (ref 27–31)
MCHC RBC-ENTMCNC: 28.6 PG
MCHC RBC-ENTMCNC: 29.9 G/DL
MCHC RBC-ENTMCNC: 30.3 G/DL — LOW (ref 32–37)
MCV RBC AUTO: 94 FL — SIGNIFICANT CHANGE UP (ref 81–99)
MCV RBC AUTO: 95.6 FL
MONOCYTES # BLD AUTO: 0.06 K/UL
MONOCYTES # BLD AUTO: 0.17 K/UL — SIGNIFICANT CHANGE UP (ref 0.1–0.6)
MONOCYTES NFR BLD AUTO: 3 %
MONOCYTES NFR BLD AUTO: 6.3 % — SIGNIFICANT CHANGE UP (ref 1.7–9.3)
NEUTROPHILS # BLD AUTO: 1.6 K/UL
NEUTROPHILS # BLD AUTO: 2 K/UL — SIGNIFICANT CHANGE UP (ref 1.4–6.5)
NEUTROPHILS NFR BLD AUTO: 74.1 % — SIGNIFICANT CHANGE UP (ref 42.2–75.2)
NEUTROPHILS NFR BLD AUTO: 81.3 %
NRBC # BLD: 0 /100 WBCS — SIGNIFICANT CHANGE UP (ref 0–0)
PLATELET # BLD AUTO: 84 K/UL
PLATELET # BLD AUTO: 84 K/UL — LOW (ref 130–400)
POTASSIUM SERPL-MCNC: 5.2 MMOL/L — HIGH (ref 3.5–5)
POTASSIUM SERPL-SCNC: 5.2 MMOL/L — HIGH (ref 3.5–5)
PROT SERPL-MCNC: 5.5 G/DL — LOW (ref 6–8)
PROTHROM AB SERPL-ACNC: 11.1 SEC — SIGNIFICANT CHANGE UP (ref 9.95–12.87)
RBC # BLD: 2.03 M/UL
RBC # BLD: 2.67 M/UL — LOW (ref 4.2–5.4)
RBC # FLD: 16.1 % — HIGH (ref 11.5–14.5)
RBC # FLD: 16.7 %
SODIUM SERPL-SCNC: 144 MMOL/L — SIGNIFICANT CHANGE UP (ref 135–146)
TIBC SERPL-MCNC: 341 UG/DL — SIGNIFICANT CHANGE UP (ref 220–430)
UIBC SERPL-MCNC: 101 UG/DL — LOW (ref 110–370)
WBC # BLD: 2.7 K/UL — LOW (ref 4.8–10.8)
WBC # FLD AUTO: 1.97 K/UL
WBC # FLD AUTO: 2.7 K/UL — LOW (ref 4.8–10.8)

## 2023-01-06 PROCEDURE — 71045 X-RAY EXAM CHEST 1 VIEW: CPT | Mod: 26

## 2023-01-06 PROCEDURE — 99221 1ST HOSP IP/OBS SF/LOW 40: CPT

## 2023-01-06 PROCEDURE — 99223 1ST HOSP IP/OBS HIGH 75: CPT

## 2023-01-06 RX ORDER — REMDESIVIR 5 MG/ML
200 INJECTION INTRAVENOUS EVERY 24 HOURS
Refills: 0 | Status: COMPLETED | OUTPATIENT
Start: 2023-01-06 | End: 2023-01-06

## 2023-01-06 RX ORDER — REMDESIVIR 5 MG/ML
100 INJECTION INTRAVENOUS EVERY 24 HOURS
Refills: 0 | Status: DISCONTINUED | OUTPATIENT
Start: 2023-01-07 | End: 2023-01-09

## 2023-01-06 RX ORDER — METOPROLOL TARTRATE 50 MG
25 TABLET ORAL
Refills: 0 | Status: DISCONTINUED | OUTPATIENT
Start: 2023-01-06 | End: 2023-01-09

## 2023-01-06 RX ORDER — LANOLIN ALCOHOL/MO/W.PET/CERES
5 CREAM (GRAM) TOPICAL AT BEDTIME
Refills: 0 | Status: DISCONTINUED | OUTPATIENT
Start: 2023-01-06 | End: 2023-01-09

## 2023-01-06 RX ORDER — REMDESIVIR 5 MG/ML
INJECTION INTRAVENOUS
Refills: 0 | Status: DISCONTINUED | OUTPATIENT
Start: 2023-01-06 | End: 2023-01-09

## 2023-01-06 RX ORDER — ACETAMINOPHEN 500 MG
650 TABLET ORAL EVERY 6 HOURS
Refills: 0 | Status: DISCONTINUED | OUTPATIENT
Start: 2023-01-06 | End: 2023-01-09

## 2023-01-06 RX ADMIN — Medication 25 MILLIGRAM(S): at 05:16

## 2023-01-06 RX ADMIN — REMDESIVIR 200 MILLIGRAM(S): 5 INJECTION INTRAVENOUS at 06:01

## 2023-01-06 RX ADMIN — PANTOPRAZOLE SODIUM 10 MG/HR: 20 TABLET, DELAYED RELEASE ORAL at 13:09

## 2023-01-06 RX ADMIN — Medication 25 MILLIGRAM(S): at 17:32

## 2023-01-06 NOTE — CONSULT NOTE ADULT - ASSESSMENT
76 year old woman with PMHx of Severe AS, Anemia 2/2 obscure GI bleeding (from Duodenum and Gastric AVM capsule endoscopy 02/2022 and enteroscopy 10/14 found to have multiple AVMs in duodenum), DARRIN (follows with Dr Gaston) w/ frequent iron transfusions presents to ED for low hemoglobin.    # Recurrent anemia in setting of Severe AS and h/o AVMs.   # H/O Diverticulosis. No diverticulitis or Hematochezia.   Last enteroscopy in 10/14/22Angiooctasia in the antrum, multiple AVMs in the duodenum proximal jejunum AVMs.   Hemoglobin 1/5: 5.6   Hemodynamically stable   Platelets 97, Leukopenia     PLAN   Transfuse to keep hemoglobin > 8.   Appreciate Hematology recommendations.   Will plan for Push enteroscopy on Monday   NPO Sunday after midnight   Will follow    76 year old woman with PMHx of Severe AS, Anemia 2/2 obscure GI bleeding (from Duodenum and Gastric AVM capsule endoscopy 02/2022 and enteroscopy 10/14 found to have multiple AVMs in duodenum), DARRIN (follows with Dr Gaston) w/ frequent iron transfusions presents to ED for severe anemia.    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 No diverticulitis or Hematochezia.   Last enteroscopy in 10/14/22: Angiooctasia in the antrum, multiple AVMs in the duodenum proximal jejunum AVMs.   Hemoglobin 1/5: 5.6   Hemodynamically stable   Platelets 97, Leukopenia     PLAN   trend Hb q12h  Transfuse prn to keep hemoglobin > 8  2 large bore IVs  monitor for objective evidence of bleeding  Appreciate Hematology recommendations  Will plan for Push enteroscopy on Monday   NPO Sunday after midnight   Will follow

## 2023-01-06 NOTE — CONSULT NOTE ADULT - SUBJECTIVE AND OBJECTIVE BOX
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 Patient was noted to have Hgb of 5.8 on outpatient labs and was sent by her oncologist for evaluation. Patient endorses SOB and cough for the past 4 days, saw her outpatient doctor and was prescribed prednisone 20mg tablets that she only took for 2 days. Patient also endorsed semi-formed dark stools occasionally. Of note, patient has history of GI bleeding with enteroscopy performed 10/14 found to have small bleeding diffuse angioectasias in stomach and multiple small AVMs in the duodenal bulb and proximal jejunum. Denies any headache, fevers, chills, chest pain, N/V/D/C.     ED vitals:  /56, HR 79, Temp 96.6F, satting 100%   Labs: WBC 1.56, Hgb 5.6, Plt 97, COVID positive    s/p 2 units pRBC. Started on Protonix drip  Admitted for acute symptomatic anemia. (05 Jan 2023 21:49)       ROS:  Negative.    PAST MEDICAL & SURGICAL HISTORY:  HTN (hypertension)      Heart murmur      Eczema      Anemia  iron infusions and PRBC transfusions      Aortic stenosis      Chronic kidney disease (CKD)      2019 novel coronavirus disease (COVID-19)  4/2020- [facility] admission      H/O appendicitis      H/O cholecystitis  s/p cholecystectomy          SOCIAL HISTORY: former smoker; denies drinking alcohol.    FAMILY HISTORY:  FH: kidney disease (Father)        MEDICATIONS  (STANDING):  influenza  Vaccine (HIGH DOSE) 0.7 milliLiter(s) IntraMuscular once  metoprolol tartrate 25 milliGRAM(s) Oral two times a day  pantoprazole Infusion 8 mG/Hr (10 mL/Hr) IV Continuous <Continuous>  remdesivir  IVPB   IV Intermittent     MEDICATIONS  (PRN):  acetaminophen     Tablet .. 650 milliGRAM(s) Oral every 6 hours PRN Temp greater or equal to 38C (100.4F), Mild Pain (1 - 3)  melatonin 5 milliGRAM(s) Oral at bedtime PRN Insomnia    Height (cm): 170.2 (01-05-23 @ 22:52)  Weight (kg): 108.9 (01-05-23 @ 22:52)  BMI (kg/m2): 37.6 (01-05-23 @ 22:52)  BSA (m2): 2.19 (01-05-23 @ 22:52)  Allergies    aspirin (Rash)  latex (Unknown)  penicillins (Unknown)    Intolerances        Vital Signs Last 24 Hrs  T(C): 36.2 (06 Jan 2023 13:19), Max: 36.3 (06 Jan 2023 05:03)  T(F): 97.1 (06 Jan 2023 13:19), Max: 97.3 (06 Jan 2023 05:03)  HR: 69 (06 Jan 2023 13:19) (69 - 80)  BP: 117/59 (06 Jan 2023 13:19) (117/59 - 181/70)  BP(mean): --  RR: 18 (06 Jan 2023 13:19) (18 - 20)  SpO2: 97% (06 Jan 2023 13:19) (95% - 100%)    Parameters below as of 06 Jan 2023 13:19  Patient On (Oxygen Delivery Method): room air        PHYSICAL EXAM  General: adult in NAD  HEENT: clear oropharynx, anicteric sclera, pink conjunctiva  Neck: supple  CV: normal S1/S2 with no murmur rubs or gallops  Lungs: positive air movement b/l ant lungs,clear to auscultation, no wheezes, no rales  Abdomen: soft non-tender non-distended, no hepatosplenomegaly  Ext: no clubbing cyanosis or edema  Skin: no rashes and no petechiae  Neuro: alert and oriented X 4, no focal deficits      LABS:                          7.6    2.70  )-----------( 84       ( 06 Jan 2023 05:48 )             25.1         Mean Cell Volume : 94.0 fL  Mean Cell Hemoglobin : 28.5 pg  Mean Cell Hemoglobin Concentration : 30.3 g/dL  Auto Neutrophil # : 2.00 K/uL  Auto Lymphocyte # : 0.47 K/uL  Auto Monocyte # : 0.17 K/uL  Auto Eosinophil # : 0.02 K/uL  Auto Basophil # : 0.01 K/uL  Auto Neutrophil % : 74.1 %  Auto Lymphocyte % : 17.4 %  Auto Monocyte % : 6.3 %  Auto Eosinophil % : 0.7 %  Auto Basophil % : 0.4 %      Serial CBC's  01-06 @ 05:48  Hct-25.1 / Hgb-7.6 / Plat-84 / RBC-2.67 / WBC-2.70  Serial CBC's  01-05 @ 18:06  Hct-18.9 / Hgb-5.6 / Plat-97 / RBC-1.97 / WBC-1.59      01-06    144  |  115<H>  |  52<H>  ----------------------------<  100<H>  5.2<H>   |  19  |  2.4<H>    Ca    8.4      06 Jan 2023 05:48  Mg     2.4     01-06    TPro  5.5<L>  /  Alb  3.4<L>  /  TBili  0.7  /  DBili  x   /  AST  11  /  ALT  5   /  AlkPhos  70  01-06      PT/INR - ( 06 Jan 2023 05:48 )   PT: 11.10 sec;   INR: 0.97 ratio         PTT - ( 06 Jan 2023 05:48 )  PTT:28.9 sec    Iron - Total Binding Capacity.: 341 ug/dL (01-06 @ 10:25)      Endoscopies  -Capsule endoscopy 2019, AVM small bowel, enteroscopy 2/2020 reached mid-jejunum, no evidence of bleeding found, portal hypertensive gastropathy, capsule endoscopy 2022 positive for bleeding in stomach and AVM in duodenum (at that time patient declined further intervention)  Abdominal pain , weight loss. Prominent mesenteric lymph nodes. PET scan from 8/9/22 no suspicious lesions    s/p push enteroscopy 10/14/22 with a few small GAVE noted in antrum, APC applied with hemostasis achieved; in duodenum small AVMs seen and APC applied with hemostasis, 2 non bleeding AVMs seen in jejunum

## 2023-01-06 NOTE — CONSULT NOTE ADULT - SUBJECTIVE AND OBJECTIVE BOX
Gastroenterology Consultation:    Patient is a 77y old  Female who presents with a chief complaint of SOB  (06 Jan 2023 16:16)    Admitted on: 01-05-23  HPI:  76 year old woman with PMHx of Severe AS, Anemia 2/2 obscure GI bleeding (from Duodenum and Gastric AVM capsule endoscopy 02/2022 and enteroscopy 10/14 found to have 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 Patient was noted to have Hgb of 5.8 on outpatient labs and was sent by her oncologist for evaluation. Patient endorses SOB and cough for the past 4 days, saw her outpatient doctor and was prescribed prednisone 20mg tablets that she only took for 2 days.  Of note, patient has history of GI bleeding with enteroscopy performed 10/14 found to have small bleeding diffuse angioectasias in stomach and multiple small AVMs in the duodenal bulb and proximal jejunum. Denies any abdominal pain, nausea, vomiting, diarrhea, constipation, hematemesis, hematochezia, melena, dysphagia or weight loss. GI consulted for recurrent anemia       Prior EGD/ colonoscopy:  < from: Enteroscopy (10.14.22 @ 12:00) >    Impressions:    Normal mucosa in the whole esophagus.    Angioectasias in the antrum. (Hemostasis).    Multiple small AVMs were noted in duodenal bulb and second part of duodenum.  APCs were applied for the purpose of hemostatsis.    PCF scope for enteroscopy. Proximal jejunum examined. No blood was noted in  jejunum. 2 small non-bleeding AVMs were noted in proximal jejunum. APCs were  applied for the purpose of hemostasis.      < end of copied text >      PAST MEDICAL & SURGICAL HISTORY:  HTN (hypertension)      Heart murmur      Eczema      Anemia  iron infusions and PRBC transfusions      Aortic stenosis      Chronic kidney disease (CKD)      2019 novel coronavirus disease (COVID-19)  4/2020- [facility] admission      H/O appendicitis      H/O cholecystitis  s/p cholecystectomy            FAMILY HISTORY:  FH: kidney disease (Father)  no FH Of gi cancers       Social History:  Tobacco:denies   Alcohol: denies   Drugs: denies     Home Medications:  Benadryl 25 mg oral capsule: 1 cap(s) orally once a day (at bedtime), As Needed (06 Jan 2023 00:42)  metoprolol tartrate 25 mg oral tablet: 1 tab(s) orally 2 times a day (14 Oct 2022 16:12)        MEDICATIONS  (STANDING):  influenza  Vaccine (HIGH DOSE) 0.7 milliLiter(s) IntraMuscular once  metoprolol tartrate 25 milliGRAM(s) Oral two times a day  pantoprazole Infusion 8 mG/Hr (10 mL/Hr) IV Continuous <Continuous>  remdesivir  IVPB   IV Intermittent     MEDICATIONS  (PRN):  acetaminophen     Tablet .. 650 milliGRAM(s) Oral every 6 hours PRN Temp greater or equal to 38C (100.4F), Mild Pain (1 - 3)  melatonin 5 milliGRAM(s) Oral at bedtime PRN Insomnia    Allergies  aspirin (Rash)  latex (Unknown)  penicillins (Unknown)    Review of Systems:   Constitutional:  No Fever, No Chills  ENT/Mouth:  No Hearing Changes,  No Difficulty Swallowing  Eyes:  No Eye Pain, No Vision Changes  Cardiovascular:  No Chest Pain, No Palpitations  Respiratory:  No Cough, No Dyspnea  Gastrointestinal:  As described in HPI  Musculoskeletal:  No Joint Swelling, No Back Pain  Skin:  No Skin Lesions, No Jaundice  Neuro:  No Syncope, No Dizziness  Heme/Lymph:  No Bruising, No Bleeding.    Physical Examination:  T(C): 36.2 (01-06-23 @ 13:19), Max: 36.3 (01-06-23 @ 05:03)  HR: 69 (01-06-23 @ 13:19) (69 - 80)  BP: 117/59 (01-06-23 @ 13:19) (117/59 - 181/70)  RR: 18 (01-06-23 @ 13:19) (18 - 18)  SpO2: 97% (01-06-23 @ 13:19) (95% - 99%)  Height (cm): 170.2 (01-05-23 @ 22:52)  Weight (kg): 108.9 (01-05-23 @ 22:52)    01-06-23 @ 07:01  -  01-06-23 @ 17:47  --------------------------------------------------------  IN: 240 mL / OUT: 0 mL / NET: 240 mL    GENERAL: AAOx3, no acute distress.  HEAD:  Atraumatic, Normocephalic  EYES: conjunctiva and sclera clear  NECK: Supple, no JVD or thyromegaly  CHEST/LUNG: Clear to auscultation bilaterally  HEART: Regular rate and rhythm; normal S1, S2, No murmurs.  ABDOMEN: Soft, nontender, nondistended  NEUROLOGY: No asterixis or tremor.   SKIN: Intact, no jaundice    Data:                        7.6    2.70  )-----------( 84       ( 06 Jan 2023 05:48 )             25.1     Hgb Trend:  7.6  01-06-23 @ 05:48  5.6  01-05-23 @ 18:06        01-06    144  |  115<H>  |  52<H>  ----------------------------<  100<H>  5.2<H>   |  19  |  2.4<H>    Ca    8.4      06 Jan 2023 05:48  Mg     2.4     01-06    TPro  5.5<L>  /  Alb  3.4<L>  /  TBili  0.7  /  DBili  x   /  AST  11  /  ALT  5   /  AlkPhos  70  01-06    Liver panel trend:  TBili 0.7   /   AST 11   /   ALT 5   /   AlkP 70   /   Tptn 5.5   /   Alb 3.4    /   DBili --      01-06  TBili 0.5   /   AST 11   /   ALT <5   /   AlkP 71   /   Tptn 5.4   /   Alb 3.2    /   DBili --      01-05  PT/INR - ( 06 Jan 2023 05:48 )   PT: 11.10 sec;   INR: 0.97 ratio    PTT - ( 06 Jan 2023 05:48 )  PTT:28.9 sec    Radiology:    `   Gastroenterology Consultation:    Patient is a 77y old  Female who presents with a chief complaint of SOB  (06 Jan 2023 16:16)    Admitted on: 01-05-23  HPI:  76 year old woman with PMHx of Severe AS, Anemia 2/2 obscure GI bleeding (from Duodenum and Gastric AVM capsule endoscopy 02/2022 and enteroscopy 10/14 found to have 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 Patient was noted to have Hgb of 5.8 on outpatient labs and was sent by her oncologist for evaluation. Patient endorses SOB and cough for the past 4 days, saw her outpatient doctor and was prescribed prednisone 20mg tablets that she only took for 2 days.  Of note, patient has history of GI bleeding with enteroscopy performed 10/14 found to have small bleeding diffuse angioectasias in stomach and multiple small AVMs in the duodenal bulb and proximal jejunum. Denies any abdominal pain, nausea, vomiting, diarrhea, constipation, hematemesis, hematochezia, melena, dysphagia or weight loss. GI consulted for recurrent anemia       Prior EGD/ colonoscopy:  < from: Enteroscopy (10.14.22 @ 12:00) >    Impressions:    Normal mucosa in the whole esophagus.    Angioectasias in the antrum. (Hemostasis).    Multiple small AVMs were noted in duodenal bulb and second part of duodenum.  APCs were applied for the purpose of hemostatsis.    PCF scope for enteroscopy. Proximal jejunum examined. No blood was noted in  jejunum. 2 small non-bleeding AVMs were noted in proximal jejunum. APCs were  applied for the purpose of hemostasis.      < end of copied text >      PAST MEDICAL & SURGICAL HISTORY:  HTN (hypertension)      Heart murmur      Eczema      Anemia  iron infusions and PRBC transfusions      Aortic stenosis      Chronic kidney disease (CKD)      2019 novel coronavirus disease (COVID-19)  4/2020- [facility] admission      H/O appendicitis      H/O cholecystitis  s/p cholecystectomy            FAMILY HISTORY:  FH: kidney disease (Father)  no FH Of gi cancers       Social History:  Tobacco:denies   Alcohol: denies   Drugs: denies     Home Medications:  Benadryl 25 mg oral capsule: 1 cap(s) orally once a day (at bedtime), As Needed (06 Jan 2023 00:42)  metoprolol tartrate 25 mg oral tablet: 1 tab(s) orally 2 times a day (14 Oct 2022 16:12)        MEDICATIONS  (STANDING):  influenza  Vaccine (HIGH DOSE) 0.7 milliLiter(s) IntraMuscular once  metoprolol tartrate 25 milliGRAM(s) Oral two times a day  pantoprazole Infusion 8 mG/Hr (10 mL/Hr) IV Continuous <Continuous>  remdesivir  IVPB   IV Intermittent     MEDICATIONS  (PRN):  acetaminophen     Tablet .. 650 milliGRAM(s) Oral every 6 hours PRN Temp greater or equal to 38C (100.4F), Mild Pain (1 - 3)  melatonin 5 milliGRAM(s) Oral at bedtime PRN Insomnia    Allergies  aspirin (Rash)  latex (Unknown)  penicillins (Unknown)    Review of Systems:   Constitutional:  No Fever, No Chills  ENT/Mouth:  No Hearing Changes,  No Difficulty Swallowing  Eyes:  No Eye Pain, No Vision Changes  Cardiovascular:  No Chest Pain, No Palpitations  Respiratory:  No Cough, No Dyspnea  Gastrointestinal:  As described in HPI  Musculoskeletal:  No Joint Swelling, No Back Pain  Skin:  No Skin Lesions, No Jaundice  Neuro:  No Syncope, No Dizziness  Heme/Lymph:  No Bruising, No Bleeding.    Physical Examination:  T(C): 36.2 (01-06-23 @ 13:19), Max: 36.3 (01-06-23 @ 05:03)  HR: 69 (01-06-23 @ 13:19) (69 - 80)  BP: 117/59 (01-06-23 @ 13:19) (117/59 - 181/70)  RR: 18 (01-06-23 @ 13:19) (18 - 18)  SpO2: 97% (01-06-23 @ 13:19) (95% - 99%)  Height (cm): 170.2 (01-05-23 @ 22:52)  Weight (kg): 108.9 (01-05-23 @ 22:52)    01-06-23 @ 07:01  -  01-06-23 @ 17:47  --------------------------------------------------------  IN: 240 mL / OUT: 0 mL / NET: 240 mL    GENERAL: AAOx3, no acute distress.  HEAD:  Atraumatic, Normocephalic  EYES: conjunctiva and sclera clear  NECK: Supple, no JVD or thyromegaly  CHEST/LUNG: Clear to auscultation bilaterally  HEART: Regular rate and rhythm; normal S1, S2, No murmurs.  ABDOMEN: Soft, nontender, nondistended  NEUROLOGY: No asterixis or tremor.   SKIN: Intact, no jaundice    Data:                        7.6    2.70  )-----------( 84       ( 06 Jan 2023 05:48 )             25.1     Hgb Trend:  7.6  01-06-23 @ 05:48  5.6  01-05-23 @ 18:06        01-06    144  |  115<H>  |  52<H>  ----------------------------<  100<H>  5.2<H>   |  19  |  2.4<H>    Ca    8.4      06 Jan 2023 05:48  Mg     2.4     01-06    TPro  5.5<L>  /  Alb  3.4<L>  /  TBili  0.7  /  DBili  x   /  AST  11  /  ALT  5   /  AlkPhos  70  01-06    Liver panel trend:  TBili 0.7   /   AST 11   /   ALT 5   /   AlkP 70   /   Tptn 5.5   /   Alb 3.4    /   DBili --      01-06  TBili 0.5   /   AST 11   /   ALT <5   /   AlkP 71   /   Tptn 5.4   /   Alb 3.2    /   DBili --      01-05  PT/INR - ( 06 Jan 2023 05:48 )   PT: 11.10 sec;   INR: 0.97 ratio    PTT - ( 06 Jan 2023 05:48 )  PTT:28.9 sec

## 2023-01-06 NOTE — CONSULT NOTE ADULT - ATTENDING COMMENTS
Agree with above A/P
I edited the note.   Time-based billing (NON-critical care).   70 minutes spent on total encounter; more than 50% of the visit was spent counseling and / or coordinating care by the attending physician.  The necessity of the time spent during the encounter on this date of service was due to: Coordination of care.

## 2023-01-06 NOTE — PROGRESS NOTE ADULT - ASSESSMENT
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 Patient was noted to have Hgb of 5.8 on outpatient labs and was sent by her oncologist for evaluation. Patient endorses SOB and cough for the past 4 days and occasional dark stool. Admitted for anemia.    #Acute on Chronic Anemia  #hx of DARRIN on Retacrit weekly and frequent iron transfusions  - follows Dr. Gaston oncologist for DARRIN  - Hgb 5.6, MCV 95.7 on admission, Plt 97  - s/p 2 units pRBC  -hgb now 7.6  -pending Fe studies on blood preinfusion  -2-3 episodes of dark stool on tuesday or wednesday  - refused OLEKSANDR  - started on Protonix drip  - NPO  - GI eval for endoscopic intervention in the AM  -heme-onc consult pending    #COVID positive  - asymptomatic, on room air  - check inflammatory markers  -ESR  - RDV x5 days    #Severe AS  - TTE 02/22 -> EF 74%, severe AS  - cardiologist Dr Munir Paula  - TAVR might be considered outpatient - but would need bleeding controlled first as there is a brief need for a/c post-TAVR    #CKD 4  - Cr 2.2 --> 2.4, at baseline  -retacrit infusions 1x week  - monitor BMP    Misc:  DVT ppx: hold AC  GI ppx: PPI drip  Diet: NPO  Activity: IAT  Code: Full Code  Dispo: Acute, tele 76 year old woman with PMHx of Severe AS, Anemia 2/2 obscure GI bleeding (from Duodenum and Gastric AVM capsule endoscopy 02/2022 and enteroscopy 10/14 found to have 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 Patient was noted to have Hgb of 5.8 on outpatient labs and was sent by her oncologist for evaluation. Patient endorses SOB and cough for the past 4 days and occasional dark stool. Admitted for anemia.    #Acute on Chronic Anemia  #hx of DARRIN on Retacrit weekly and frequent iron transfusions  - follows Dr. Gaston oncologist for DARRIN  - Hgb 5.6, MCV 95.7 on admission, Plt 97  - s/p 2 units pRBC  -hgb now 7.6  -pending Fe studies on blood preinfusion  -2-3 episodes of dark stool on tuesday or wednesday  - refused OLEKSANDR  - started on Protonix drip  - NPO  - GI eval for endoscopic intervention in the AM  -heme-onc consult pending    #COVID positive  - asymptomatic, on room air  -took prednisone for SOB for 2 days   - check inflammatory markers  -ESR 6.4  - RDV x5 days    #Severe AS  - TTE 02/22 -> EF 74%, severe AS  - cardiologist Dr Munir Paula  - TAVR might be considered outpatient - but would need bleeding controlled first as there is a brief need for a/c post-TAVR    #CKD 4  - Cr 2.2 --> 2.4, at baseline  -retacrit infusions 1x week  - monitor BMP    Misc:  DVT ppx: hold AC  GI ppx: PPI drip  Diet: NPO  Activity: IAT  Code: Full Code  Dispo: Acute, tele 76 year old woman with PMHx of Severe AS, Anemia 2/2 obscure GI bleeding (from Duodenum and Gastric AVM capsule endoscopy 02/2022 and enteroscopy 10/14 found to have 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 Patient was noted to have Hgb of 5.8 on outpatient labs and was sent by her oncologist for evaluation. Patient endorses SOB and cough for the past 4 days and occasional dark stool. Admitted for anemia.    #Acute on Chronic Anemia  #hx of DARRIN on Retacrit weekly and frequent iron transfusions  - follows Dr. Gaston oncologist for DARRIN  - Hgb 5.6, MCV 95.7 on admission, Plt 97  - s/p 2 units pRBC  -hgb now 7.6  -2-3 episodes of dark stool on tuesday or wednesday  - refused OLEKSANDR  - started on Protonix drip  -heme-onc consult pending  -push endoscopy monday  -NPO sunday night and endoscopy prep     #COVID positive  - asymptomatic, on room air  -took prednisone for SOB for 2 days   - check inflammatory markers  -ESR 6.4  -d-dimer 274  - RDV x5 days    #Severe AS  - TTE 02/22 -> EF 74%, severe AS  - cardiologist Dr Munir Paula  - TAVR might be considered outpatient - but would need bleeding controlled first as there is a brief need for a/c post-TAVR    #CKD 4  - Cr 2.2 --> 2.4, at baseline  -retacrit infusions 1x week  - monitor BMP    Misc:  DVT ppx: hold AC  GI ppx: PPI drip  Diet: NPO  Activity: IAT  Code: Full Code  Dispo: Acute, tele

## 2023-01-06 NOTE — PROGRESS NOTE ADULT - SUBJECTIVE AND OBJECTIVE BOX
Hospital Day:  1d    Subjective:    Patient is a 77y old  Female who presents with a chief complaint of anemia (05 Jan 2023 21:49)      Admitted to medicine for a primary diagnosis of acute symptomatic anemia     INTERVAL HPI AND OVERNIGHT EVENTS:  Patient was examined and seen at bedside. This morning she is resting comfortably in bed and reports no issues or overnight events. She has not had a bowel movement since tuesday or wednesday and is NPO for endoscopy today.     Past Medical Hx:   HTN (hypertension)    Heart murmur    Eczema    Anemia    Aortic stenosis    Chronic kidney disease (CKD)    2019 novel coronavirus disease (COVID-19)      Past Sx:  H/O appendicitis    H/O cholecystitis      Allergies:  aspirin (Rash)  latex (Unknown)  penicillins (Unknown)    Current Meds:   Standng Meds:  influenza  Vaccine (HIGH DOSE) 0.7 milliLiter(s) IntraMuscular once  metoprolol tartrate 25 milliGRAM(s) Oral two times a day  pantoprazole Infusion 8 mG/Hr (10 mL/Hr) IV Continuous <Continuous>  remdesivir  IVPB   IV Intermittent     PRN Meds:  acetaminophen     Tablet .. 650 milliGRAM(s) Oral every 6 hours PRN Temp greater or equal to 38C (100.4F), Mild Pain (1 - 3)  melatonin 5 milliGRAM(s) Oral at bedtime PRN Insomnia    HOME MEDICATIONS:  Benadryl 25 mg oral capsule: 1 cap(s) orally once a day (at bedtime), As Needed  metoprolol tartrate 25 mg oral tablet: 1 tab(s) orally 2 times a day      Vital Signs:   T(F): 97.3 (01-06-23 @ 05:03), Max: 97.3 (01-06-23 @ 05:03)  HR: 75 (01-06-23 @ 05:03) (74 - 80)  BP: 151/69 (01-06-23 @ 05:03) (122/58 - 181/70)  RR: 18 (01-06-23 @ 05:03) (18 - 20)  SpO2: 97% (01-06-23 @ 04:32) (95% - 100%)        Physical Exam:   GENERAL: NAD  HEENT: NCAT  CHEST/LUNG: CTAB  HEART: Regular rate and rhythm; s1 s2 appreciated, No murmurs, rubs, or gallops  ABDOMEN: Soft, Nontender, Nondistended; Bowel sounds present  EXTREMITIES: No LE edema b/l  SKIN: no rashes, no new lesions  NERVOUS SYSTEM:  Alert & Oriented X3  LINES/CATHETERS:        Labs:                         7.6    2.70  )-----------( 84       ( 06 Jan 2023 05:48 )             25.1     Neutophil% 74.1, Lymphocyte% 17.4, Monocyte% 6.3, Bands% 1.1 01-06-23 @ 05:48    06 Jan 2023 05:48    144    |  115    |  52     ----------------------------<  100    5.2     |  19     |  2.4      Ca    8.4        06 Jan 2023 05:48  Mg     2.4       06 Jan 2023 05:48    TPro  5.5    /  Alb  3.4    /  TBili  0.7    /  DBili  x      /  AST  11     /  ALT  5      /  AlkPhos  70     06 Jan 2023 05:48       pTT    28.9             ----< 0.97 INR  (01-06-23 @ 05:48)    11.10        PT

## 2023-01-06 NOTE — CONSULT NOTE ADULT - ASSESSMENT
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 Patient was noted to have Hgb of 5.8 on outpatient labs and was sent by her oncologist for evaluation.     Hematology and Oncology consulted for acute on chronic anemia and pancytopenia.    #Acute on chronic transfusion dependent anemia likely due to CKD and obscure GI bleeding  #Leukopenia and thrombocytopenia ? covid r/o other etiology.  #Hx OF Arterio venous malformations  s/p APC.  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 Gaston oncologist for DARRIN  - Hgb 5.6, MCV 95.7 on admission, Plt 97  -hb responded appropriately with transfusions.  -iron saturation 70; tibc 341.  -coagulations normal    #covid pneumonia  -on room air.      Plan  -follow up with GI  -please obtain vitamin b12. folate, MMA, reticulocyte count, LDH, haptoglobin , fibrinogen,   -please sent peripheral blood  flow cytometry (Two lavender and two dark green tubes)  -Monitor cbc with differential  -tranfuse if hb <7.  -c/w retacrit 30,000 weekly.

## 2023-01-07 LAB
ALBUMIN SERPL ELPH-MCNC: 3.1 G/DL — LOW (ref 3.5–5.2)
ALP SERPL-CCNC: 65 U/L — SIGNIFICANT CHANGE UP (ref 30–115)
ALT FLD-CCNC: <5 U/L — SIGNIFICANT CHANGE UP (ref 0–41)
ANION GAP SERPL CALC-SCNC: 8 MMOL/L — SIGNIFICANT CHANGE UP (ref 7–14)
AST SERPL-CCNC: 14 U/L — SIGNIFICANT CHANGE UP (ref 0–41)
BASOPHILS # BLD AUTO: 0.01 K/UL — SIGNIFICANT CHANGE UP (ref 0–0.2)
BASOPHILS NFR BLD AUTO: 0.3 % — SIGNIFICANT CHANGE UP (ref 0–1)
BILIRUB SERPL-MCNC: 0.5 MG/DL — SIGNIFICANT CHANGE UP (ref 0.2–1.2)
BLD GP AB SCN SERPL QL: SIGNIFICANT CHANGE UP
BUN SERPL-MCNC: 58 MG/DL — HIGH (ref 10–20)
CALCIUM SERPL-MCNC: 8.2 MG/DL — LOW (ref 8.4–10.5)
CHLORIDE SERPL-SCNC: 115 MMOL/L — HIGH (ref 98–110)
CO2 SERPL-SCNC: 20 MMOL/L — SIGNIFICANT CHANGE UP (ref 17–32)
CREAT SERPL-MCNC: 2.7 MG/DL — HIGH (ref 0.7–1.5)
EGFR: 18 ML/MIN/1.73M2 — LOW
EOSINOPHIL # BLD AUTO: 0.13 K/UL — SIGNIFICANT CHANGE UP (ref 0–0.7)
EOSINOPHIL NFR BLD AUTO: 3.3 % — SIGNIFICANT CHANGE UP (ref 0–8)
FERRITIN SERPL-MCNC: 128 NG/ML — SIGNIFICANT CHANGE UP (ref 15–150)
FERRITIN SERPL-MCNC: 46 NG/ML — SIGNIFICANT CHANGE UP (ref 15–150)
FIBRINOGEN PPP-MCNC: 484 MG/DL — SIGNIFICANT CHANGE UP (ref 204.4–570.6)
GLUCOSE SERPL-MCNC: 91 MG/DL — SIGNIFICANT CHANGE UP (ref 70–99)
HCT VFR BLD CALC: 26.3 % — LOW (ref 37–47)
HCT VFR BLD CALC: 26.6 % — LOW (ref 37–47)
HGB BLD-MCNC: 7.6 G/DL — LOW (ref 12–16)
HGB BLD-MCNC: 7.8 G/DL — LOW (ref 12–16)
IMM GRANULOCYTES NFR BLD AUTO: 1.5 % — HIGH (ref 0.1–0.3)
LDH SERPL L TO P-CCNC: 181 — SIGNIFICANT CHANGE UP (ref 50–242)
LYMPHOCYTES # BLD AUTO: 0.83 K/UL — LOW (ref 1.2–3.4)
LYMPHOCYTES # BLD AUTO: 21.3 % — SIGNIFICANT CHANGE UP (ref 20.5–51.1)
MAGNESIUM SERPL-MCNC: 2.2 MG/DL — SIGNIFICANT CHANGE UP (ref 1.8–2.4)
MCHC RBC-ENTMCNC: 27.9 PG — SIGNIFICANT CHANGE UP (ref 27–31)
MCHC RBC-ENTMCNC: 28.6 PG — SIGNIFICANT CHANGE UP (ref 27–31)
MCHC RBC-ENTMCNC: 28.9 G/DL — LOW (ref 32–37)
MCHC RBC-ENTMCNC: 29.3 G/DL — LOW (ref 32–37)
MCV RBC AUTO: 96.7 FL — SIGNIFICANT CHANGE UP (ref 81–99)
MCV RBC AUTO: 97.4 FL — SIGNIFICANT CHANGE UP (ref 81–99)
MONOCYTES # BLD AUTO: 0.37 K/UL — SIGNIFICANT CHANGE UP (ref 0.1–0.6)
MONOCYTES NFR BLD AUTO: 9.5 % — HIGH (ref 1.7–9.3)
NEUTROPHILS # BLD AUTO: 2.49 K/UL — SIGNIFICANT CHANGE UP (ref 1.4–6.5)
NEUTROPHILS NFR BLD AUTO: 64.1 % — SIGNIFICANT CHANGE UP (ref 42.2–75.2)
NRBC # BLD: 0 /100 WBCS — SIGNIFICANT CHANGE UP (ref 0–0)
NRBC # BLD: 0 /100 WBCS — SIGNIFICANT CHANGE UP (ref 0–0)
PLATELET # BLD AUTO: 101 K/UL — LOW (ref 130–400)
PLATELET # BLD AUTO: 106 K/UL — LOW (ref 130–400)
POTASSIUM SERPL-MCNC: 5.4 MMOL/L — HIGH (ref 3.5–5)
POTASSIUM SERPL-SCNC: 5.4 MMOL/L — HIGH (ref 3.5–5)
PROCALCITONIN SERPL-MCNC: 0.16 NG/ML — HIGH (ref 0.02–0.1)
PROT SERPL-MCNC: 5.3 G/DL — LOW (ref 6–8)
RBC # BLD: 2.72 M/UL — LOW (ref 4.2–5.4)
RBC # BLD: 2.73 M/UL — LOW (ref 4.2–5.4)
RBC # BLD: 2.73 M/UL — LOW (ref 4.2–5.4)
RBC # FLD: 16.9 % — HIGH (ref 11.5–14.5)
RBC # FLD: 17 % — HIGH (ref 11.5–14.5)
RETICS #: 135.1 K/UL — HIGH (ref 25–125)
RETICS/RBC NFR: 5 % — HIGH (ref 0.5–1.5)
SODIUM SERPL-SCNC: 143 MMOL/L — SIGNIFICANT CHANGE UP (ref 135–146)
WBC # BLD: 3.19 K/UL — LOW (ref 4.8–10.8)
WBC # BLD: 3.89 K/UL — LOW (ref 4.8–10.8)
WBC # FLD AUTO: 3.19 K/UL — LOW (ref 4.8–10.8)
WBC # FLD AUTO: 3.89 K/UL — LOW (ref 4.8–10.8)

## 2023-01-07 PROCEDURE — 99233 SBSQ HOSP IP/OBS HIGH 50: CPT

## 2023-01-07 RX ADMIN — REMDESIVIR 200 MILLIGRAM(S): 5 INJECTION INTRAVENOUS at 06:58

## 2023-01-07 RX ADMIN — Medication 25 MILLIGRAM(S): at 17:43

## 2023-01-07 RX ADMIN — Medication 25 MILLIGRAM(S): at 06:50

## 2023-01-07 RX ADMIN — PANTOPRAZOLE SODIUM 10 MG/HR: 20 TABLET, DELAYED RELEASE ORAL at 02:53

## 2023-01-07 NOTE — PROGRESS NOTE ADULT - SUBJECTIVE AND OBJECTIVE BOX
ARREAGALATRICIA BARRON  77y  Female      Patient is a 77y old  Female who presents with a chief complaint of anemia (06 Jan 2023 17:43)      INTERVAL HPI/OVERNIGHT EVENTS:  She feels ok, no further melena, no abdominal pain.   Vital Signs Last 24 Hrs  T(C): 35.7 (07 Jan 2023 04:48), Max: 36.5 (06 Jan 2023 20:51)  T(F): 96.3 (07 Jan 2023 04:48), Max: 97.7 (06 Jan 2023 20:51)  HR: 85 (07 Jan 2023 06:30) (69 - 89)  BP: 133/60 (07 Jan 2023 06:30) (108/56 - 133/60)  BP(mean): --  RR: 18 (07 Jan 2023 04:48) (18 - 18)  SpO2: 97% (06 Jan 2023 13:19) (97% - 97%)    Parameters below as of 06 Jan 2023 13:19  Patient On (Oxygen Delivery Method): room air          01-06-23 @ 07:01  -  01-07-23 @ 07:00  --------------------------------------------------------  IN: 240 mL / OUT: 0 mL / NET: 240 mL            Consultant(s) Notes Reviewed:  [x ] YES  [ ] NO          MEDICATIONS  (STANDING):  influenza  Vaccine (HIGH DOSE) 0.7 milliLiter(s) IntraMuscular once  metoprolol tartrate 25 milliGRAM(s) Oral two times a day  pantoprazole Infusion 8 mG/Hr (10 mL/Hr) IV Continuous <Continuous>  remdesivir  IVPB   IV Intermittent   remdesivir  IVPB 100 milliGRAM(s) IV Intermittent every 24 hours    MEDICATIONS  (PRN):  acetaminophen     Tablet .. 650 milliGRAM(s) Oral every 6 hours PRN Temp greater or equal to 38C (100.4F), Mild Pain (1 - 3)  melatonin 5 milliGRAM(s) Oral at bedtime PRN Insomnia      LABS                          7.6    3.89  )-----------( 106      ( 07 Jan 2023 06:06 )             26.3     01-07    143  |  115<H>  |  58<H>  ----------------------------<  91  5.4<H>   |  20  |  2.7<H>    Ca    8.2<L>      07 Jan 2023 06:06  Mg     2.2     01-07    TPro  5.3<L>  /  Alb  3.1<L>  /  TBili  0.5  /  DBili  x   /  AST  14  /  ALT  <5  /  AlkPhos  65  01-07        PT/INR - ( 06 Jan 2023 05:48 )   PT: 11.10 sec;   INR: 0.97 ratio         PTT - ( 06 Jan 2023 05:48 )  PTT:28.9 sec  Lactate Trend        CAPILLARY BLOOD GLUCOSE            RADIOLOGY & ADDITIONAL TESTS:    Imaging Personally Reviewed:  [ ] YES  [ ] NO    HEALTH ISSUES - PROBLEM Dx:          PHYSICAL EXAM:  GENERAL: NAD, well-developed.  HEAD:  Atraumatic, Normocephalic.  EYES: EOMI, PERRLA, conjunctiva and sclera clear.  NECK: Supple, No JVD.  CHEST/LUNG: Clear to auscultation bilaterally; No wheeze.  HEART: Regular rate and rhythm; S1 S2. SM 3/6 on left sternum.   ABDOMEN: Soft, Nontender, Nondistended; Bowel sounds present.  EXTREMITIES:  2+ Peripheral Pulses, No clubbing, cyanosis, or edema.  PSYCH: AAOx3.  NEUROLOGY: non-focal.  SKIN: No rashes or lesions.

## 2023-01-07 NOTE — PROGRESS NOTE ADULT - ASSESSMENT
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 She received 2 RBC in the ED.     A/P:   Acute on Chronic Anemia: Due to blood loss from GI bleeding.   History of GI tract AVMs s/p   Melena:   Patient with multiple GI procedures  Last Enteroscopy 10/14/22   Angioectasias in the antrum. Multiple small AVMs in duodenal bulb and second part of duodenum. 2 small non-bleeding AVMs were noted in proximal jejunum.  GI plan for enteroscopy on Monday.   Continue PPI.     COVID-19 infection:   Asymptomatic, on room air.  Procalcitonin 0.16, D-dimer 274  Continue Remdesivir.     Macrocytic Anemia:  Thrombocytopenia:   Hematology consult, check b12, folate, MMA, peripheral blood flowcytometry.     Severe Aortic Stenosis  TTE 02/22 -> EF 74%, severe AS  Cardiology follow up outpatient.     CKD 4  Mild Hyperkalemia   Cr 27,   monitor BMP.    DVT Prophylaxis: SCD.   #Progress Note Handoff:  Pending (specify): enteroscopy on Monday, monitor Hb.   Family discussion:  Disposition: Home.

## 2023-01-07 NOTE — PROGRESS NOTE ADULT - ASSESSMENT
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 Patient was noted to have Hgb of 5.8 on outpatient labs and was sent by her oncologist for evaluation. Patient endorses SOB and cough for the past 4 days and occasional dark stool. Admitted for anemia.    #Acute on Chronic Anemia  #hx of DARRIN on Retacrit weekly and frequent iron transfusions  - follows Dr. Gaston oncologist for DARRIN  - Hgb 5.6, MCV 95.7 on admission, Plt 97  - s/p 2 units pRBC  - hgb now 7.6 stable  - keep active T/S   - refused OLEKSANDR  - started on Protonix drip  -heme-onc consult: please obtain vitamin b12. folate, MMA, reticulocyte count, LDH, haptoglobin , fibrinogen, please sent peripheral blood  flow cytometry (Two lavender and two dark green tubes)  -push endoscopy monday  -NPO sunday night and pre-op labs     #COVID positive  - asymptomatic, on room air  -took prednisone for SOB for 2 days   - check inflammatory markers  -ESR 6.4  -d-dimer 274  - RDV x5 days    #Severe AS  - TTE 02/22 -> EF 74%, severe AS  - cardiologist Dr Munir Paula  - TAVR might be considered outpatient - but would need bleeding controlled first as there is a brief need for a/c post-TAVR    #CKD 4  - Cr 2.2 --> 2.4, at baseline  -retacrit infusions 1x week  - monitor BMP    Misc:  DVT ppx: hold AC  GI ppx: PPI drip  Diet: NPO  Activity: IAT  Code: Full Code  Dispo: Acute, tele 76 year old woman with PMHx of Severe AS, Anemia 2/2 obscure GI bleeding (from Duodenum and Gastric AVM capsule endoscopy 02/2022 and enteroscopy 10/14 found to have 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 Patient was noted to have Hgb of 5.8 on outpatient labs and was sent by her oncologist for evaluation. Patient endorses SOB and cough for the past 4 days and occasional dark stool. Admitted for anemia.    #Acute on Chronic Anemia  #hx of DARRIN on Retacrit weekly and frequent iron transfusions  - follows Dr. Gaston oncologist for DARRIN  - Hgb 5.6, MCV 95.7 on admission, Plt 97  - s/p 2 units pRBC  - hgb now 7.6 stable  - keep active T/S   - refused OLEKSANDR  - started on Protonix drip  -heme-onc consult: please obtain vitamin b12. folate, MMA, reticulocyte count, LDH, haptoglobin , fibrinogen, please sent peripheral blood  flow cytometry (Two lavender and two dark green tubes)  -push endoscopy monday  -NPO sunday night and pre-op labs     #COVID positive  - asymptomatic, on room air  -took prednisone for SOB for 2 days   - check inflammatory markers  -ESR 6.4  -d-dimer 274  - RDV x5 days    #Severe AS  - TTE 02/22 -> EF 74%, severe AS  - cardiologist Dr Munir Paula  - TAVR might be considered outpatient - but would need bleeding controlled first as there is a brief need for a/c post-TAVR    #CKD 4  - Cr 2.2 --> 2.4, at baseline  -retacrit infusions 1x week  - monitor BMP    Misc:  DVT ppx: hold AC  GI ppx: PPI drip  Diet: regular  Activity: IAT  Code: Full Code  Dispo: pending endoscopy on monday

## 2023-01-07 NOTE — PROGRESS NOTE ADULT - SUBJECTIVE AND OBJECTIVE BOX
Hospital Day:  2d    Subjective:    Patient is a 77y old  Female who presents with a chief complaint of anemia (05 Jan 2023 21:49)      Admitted to medicine for a primary diagnosis of acute symptomatic anemia     INTERVAL HPI AND OVERNIGHT EVENTS:  Patient was examined and seen at bedside. This morning she is resting comfortably in bed and reports no issues or overnight events.     Past Medical Hx:   HTN (hypertension)    Heart murmur    Eczema    Anemia    Aortic stenosis    Chronic kidney disease (CKD)    2019 novel coronavirus disease (COVID-19)      Past Sx:  H/O appendicitis    H/O cholecystitis      Allergies:  aspirin (Rash)  latex (Unknown)  penicillins (Unknown)    Current Meds:   Standng Meds:  influenza  Vaccine (HIGH DOSE) 0.7 milliLiter(s) IntraMuscular once  metoprolol tartrate 25 milliGRAM(s) Oral two times a day  pantoprazole Infusion 8 mG/Hr (10 mL/Hr) IV Continuous <Continuous>  remdesivir  IVPB   IV Intermittent     PRN Meds:  acetaminophen     Tablet .. 650 milliGRAM(s) Oral every 6 hours PRN Temp greater or equal to 38C (100.4F), Mild Pain (1 - 3)  melatonin 5 milliGRAM(s) Oral at bedtime PRN Insomnia    HOME MEDICATIONS:  Benadryl 25 mg oral capsule: 1 cap(s) orally once a day (at bedtime), As Needed  metoprolol tartrate 25 mg oral tablet: 1 tab(s) orally 2 times a day      Vital Signs:   Vital Signs Last 24 Hrs  T(C): 35.7 (07 Jan 2023 04:48), Max: 36.5 (06 Jan 2023 20:51)  T(F): 96.3 (07 Jan 2023 04:48), Max: 97.7 (06 Jan 2023 20:51)  HR: 85 (07 Jan 2023 06:30) (69 - 89)  BP: 133/60 (07 Jan 2023 06:30) (108/56 - 133/60)  BP(mean): --  RR: 18 (07 Jan 2023 04:48) (18 - 18)  SpO2: 97% (06 Jan 2023 13:19) (97% - 97%)    Parameters below as of 06 Jan 2023 13:19  Patient On (Oxygen Delivery Method): room air          Physical Exam:   GENERAL: NAD  HEENT: NCAT  CHEST/LUNG: CTAB  HEART: Regular rate and rhythm; s1 s2 appreciated, No murmurs, rubs, or gallops  ABDOMEN: Soft, Nontender, Nondistended; Bowel sounds present  EXTREMITIES: No LE edema b/l  SKIN: no rashes, no new lesions  NERVOUS SYSTEM:  Alert & Oriented X3  LINES/CATHETERS:        Labs:                         7.6    2.70  )-----------( 84       ( 06 Jan 2023 05:48 )             25.1     Neutophil% 74.1, Lymphocyte% 17.4, Monocyte% 6.3, Bands% 1.1 01-06-23 @ 05:48    06 Jan 2023 05:48    144    |  115    |  52     ----------------------------<  100    5.2     |  19     |  2.4      Ca    8.4        06 Jan 2023 05:48  Mg     2.4       06 Jan 2023 05:48    TPro  5.5    /  Alb  3.4    /  TBili  0.7    /  DBili  x      /  AST  11     /  ALT  5      /  AlkPhos  70     06 Jan 2023 05:48       pTT    28.9             ----< 0.97 INR  (01-06-23 @ 05:48)    11.10        PT

## 2023-01-08 LAB
FOLATE SERPL-MCNC: 5.3 NG/ML — SIGNIFICANT CHANGE UP
HAPTOGLOB SERPL-MCNC: 98 MG/DL — SIGNIFICANT CHANGE UP (ref 34–200)
SARS-COV-2 RNA SPEC QL NAA+PROBE: DETECTED
VIT B12 SERPL-MCNC: 295 PG/ML — SIGNIFICANT CHANGE UP (ref 232–1245)

## 2023-01-08 PROCEDURE — 99233 SBSQ HOSP IP/OBS HIGH 50: CPT

## 2023-01-08 RX ORDER — FUROSEMIDE 40 MG
40 TABLET ORAL ONCE
Refills: 0 | Status: COMPLETED | OUTPATIENT
Start: 2023-01-08 | End: 2023-01-08

## 2023-01-08 RX ADMIN — Medication 40 MILLIGRAM(S): at 10:46

## 2023-01-08 RX ADMIN — REMDESIVIR 200 MILLIGRAM(S): 5 INJECTION INTRAVENOUS at 05:28

## 2023-01-08 RX ADMIN — PANTOPRAZOLE SODIUM 10 MG/HR: 20 TABLET, DELAYED RELEASE ORAL at 12:08

## 2023-01-08 RX ADMIN — Medication 25 MILLIGRAM(S): at 17:16

## 2023-01-08 RX ADMIN — PANTOPRAZOLE SODIUM 10 MG/HR: 20 TABLET, DELAYED RELEASE ORAL at 05:29

## 2023-01-08 RX ADMIN — Medication 25 MILLIGRAM(S): at 05:28

## 2023-01-08 NOTE — PROGRESS NOTE ADULT - ASSESSMENT
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 She received 2 RBC in the ED.     A/P:   Acute on Chronic Anemia: Due to blood loss from GI bleeding.   History of GI tract AVMs s/p   Melena:   Patient with multiple GI procedures  Last Enteroscopy 10/14/22   Angioectasias in the antrum. Multiple small AVMs in duodenal bulb and second part of duodenum. 2 small non-bleeding AVMs were noted in proximal jejunum.  GI plan for enteroscopy on Monday.   Continue PPI.     COVID-19 infection:   Asymptomatic, on room air.  Procalcitonin 0.16, D-dimer 274, monitor for now.   Continue Remdesivir.     Macrocytic Anemia:  Thrombocytopenia:   Hematology consult, check b12, folate, MMA, peripheral blood flowcytometry.     Severe Aortic Stenosis  TTE 02/22 -> EF 74%, severe AS  SOB and mild leg edema, will give Lasix one dose today.   Cardiology follow up outpatient.     CKD 4  Mild Hyperkalemia  Cr 27,   monitor BMP.    DVT Prophylaxis: SCD.   #Progress Note Handoff:  Pending (specify): enteroscopy on Monday, monitor Hb.   Family discussion:  Disposition: Home.

## 2023-01-08 NOTE — PROGRESS NOTE ADULT - SUBJECTIVE AND OBJECTIVE BOX
ARREAGALATRICIA BARRON  77y  Female      Patient is a 77y old  Female who presents with a chief complaint of anemia (07 Jan 2023 13:06)      INTERVAL HPI/OVERNIGHT EVENTS:  Patient feels more SOB, patient states she was supposed to take Torsemide at home as needed for leg edema and SOB.   Vital Signs Last 24 Hrs  T(C): 36.2 (08 Jan 2023 04:25), Max: 36.2 (08 Jan 2023 04:25)  T(F): 97.1 (08 Jan 2023 04:25), Max: 97.1 (08 Jan 2023 04:25)  HR: 69 (08 Jan 2023 04:25) (67 - 70)  BP: 118/55 (08 Jan 2023 04:25) (118/55 - 135/63)  BP(mean): --  RR: 18 (08 Jan 2023 04:25) (18 - 18)  SpO2: 97% (07 Jan 2023 19:48) (97% - 97%)    Parameters below as of 07 Jan 2023 19:48  Patient On (Oxygen Delivery Method): nasal cannula  O2 Flow (L/min): 2        01-07-23 @ 07:01  -  01-08-23 @ 07:00  --------------------------------------------------------  IN: 230 mL / OUT: 0 mL / NET: 230 mL            Consultant(s) Notes Reviewed:  [x ] YES  [ ] NO          MEDICATIONS  (STANDING):  furosemide   Injectable 40 milliGRAM(s) IV Push once  influenza  Vaccine (HIGH DOSE) 0.7 milliLiter(s) IntraMuscular once  metoprolol tartrate 25 milliGRAM(s) Oral two times a day  pantoprazole Infusion 8 mG/Hr (10 mL/Hr) IV Continuous <Continuous>  remdesivir  IVPB   IV Intermittent   remdesivir  IVPB 100 milliGRAM(s) IV Intermittent every 24 hours    MEDICATIONS  (PRN):  acetaminophen     Tablet .. 650 milliGRAM(s) Oral every 6 hours PRN Temp greater or equal to 38C (100.4F), Mild Pain (1 - 3)  melatonin 5 milliGRAM(s) Oral at bedtime PRN Insomnia      LABS                          7.8    3.19  )-----------( 101      ( 07 Jan 2023 20:51 )             26.6     01-07    143  |  115<H>  |  58<H>  ----------------------------<  91  5.4<H>   |  20  |  2.7<H>    Ca    8.2<L>      07 Jan 2023 06:06  Mg     2.2     01-07    TPro  5.3<L>  /  Alb  3.1<L>  /  TBili  0.5  /  DBili  x   /  AST  14  /  ALT  <5  /  AlkPhos  65  01-07          Lactate Trend        CAPILLARY BLOOD GLUCOSE            RADIOLOGY & ADDITIONAL TESTS:    Imaging Personally Reviewed:  [ ] YES  [ ] NO    HEALTH ISSUES - PROBLEM Dx:          PHYSICAL EXAM:  GENERAL: NAD, well-developed.  HEAD:  Atraumatic, Normocephalic.  EYES: EOMI, PERRLA, conjunctiva and sclera clear.  NECK: Supple, No JVD.  CHEST/LUNG: Clear to auscultation bilaterally; No wheeze.  HEART: Regular rate and rhythm; S1 S2. SM 3/6 on left sternum.   ABDOMEN: Soft, Nontender, Nondistended; Bowel sounds present.  EXTREMITIES:  2+ Peripheral Pulses, No clubbing, cyanosis, mild leg edema 1+  PSYCH: AAOx3.  NEUROLOGY: non-focal.  SKIN: No rashes or lesions.

## 2023-01-09 ENCOUNTER — TRANSCRIPTION ENCOUNTER (OUTPATIENT)
Age: 78
End: 2023-01-09

## 2023-01-09 VITALS
SYSTOLIC BLOOD PRESSURE: 131 MMHG | DIASTOLIC BLOOD PRESSURE: 62 MMHG | HEART RATE: 66 BPM | TEMPERATURE: 98 F | RESPIRATION RATE: 18 BRPM

## 2023-01-09 LAB
ALBUMIN SERPL ELPH-MCNC: 3.5 G/DL — SIGNIFICANT CHANGE UP (ref 3.5–5.2)
ALP SERPL-CCNC: 75 U/L — SIGNIFICANT CHANGE UP (ref 30–115)
ALT FLD-CCNC: 7 U/L — SIGNIFICANT CHANGE UP (ref 0–41)
ANION GAP SERPL CALC-SCNC: 10 MMOL/L — SIGNIFICANT CHANGE UP (ref 7–14)
APTT BLD: 32.4 SEC — SIGNIFICANT CHANGE UP (ref 27–39.2)
AST SERPL-CCNC: 14 U/L — SIGNIFICANT CHANGE UP (ref 0–41)
BASOPHILS # BLD AUTO: 0.01 K/UL — SIGNIFICANT CHANGE UP (ref 0–0.2)
BASOPHILS NFR BLD AUTO: 0.3 % — SIGNIFICANT CHANGE UP (ref 0–1)
BILIRUB SERPL-MCNC: 0.7 MG/DL — SIGNIFICANT CHANGE UP (ref 0.2–1.2)
BUN SERPL-MCNC: 59 MG/DL — HIGH (ref 10–20)
CALCIUM SERPL-MCNC: 8.4 MG/DL — SIGNIFICANT CHANGE UP (ref 8.4–10.5)
CHLORIDE SERPL-SCNC: 119 MMOL/L — HIGH (ref 98–110)
CO2 SERPL-SCNC: 22 MMOL/L — SIGNIFICANT CHANGE UP (ref 17–32)
CREAT SERPL-MCNC: 2.7 MG/DL — HIGH (ref 0.7–1.5)
EGFR: 18 ML/MIN/1.73M2 — LOW
EOSINOPHIL # BLD AUTO: 0.18 K/UL — SIGNIFICANT CHANGE UP (ref 0–0.7)
EOSINOPHIL NFR BLD AUTO: 5.2 % — SIGNIFICANT CHANGE UP (ref 0–8)
GLUCOSE SERPL-MCNC: 90 MG/DL — SIGNIFICANT CHANGE UP (ref 70–99)
HCT VFR BLD CALC: 24 % — LOW (ref 37–47)
HGB BLD-MCNC: 7.5 G/DL — LOW (ref 12–16)
IMM GRANULOCYTES NFR BLD AUTO: 0.9 % — HIGH (ref 0.1–0.3)
INR BLD: 1.14 RATIO — SIGNIFICANT CHANGE UP (ref 0.65–1.3)
LYMPHOCYTES # BLD AUTO: 0.61 K/UL — LOW (ref 1.2–3.4)
LYMPHOCYTES # BLD AUTO: 17.8 % — LOW (ref 20.5–51.1)
MAGNESIUM SERPL-MCNC: 2.2 MG/DL — SIGNIFICANT CHANGE UP (ref 1.8–2.4)
MCHC RBC-ENTMCNC: 29.4 PG — SIGNIFICANT CHANGE UP (ref 27–31)
MCHC RBC-ENTMCNC: 31.3 G/DL — LOW (ref 32–37)
MCV RBC AUTO: 94.1 FL — SIGNIFICANT CHANGE UP (ref 81–99)
MONOCYTES # BLD AUTO: 0.37 K/UL — SIGNIFICANT CHANGE UP (ref 0.1–0.6)
MONOCYTES NFR BLD AUTO: 10.8 % — HIGH (ref 1.7–9.3)
NEUTROPHILS # BLD AUTO: 2.23 K/UL — SIGNIFICANT CHANGE UP (ref 1.4–6.5)
NEUTROPHILS NFR BLD AUTO: 65 % — SIGNIFICANT CHANGE UP (ref 42.2–75.2)
NRBC # BLD: 0 /100 WBCS — SIGNIFICANT CHANGE UP (ref 0–0)
PLATELET # BLD AUTO: 126 K/UL — LOW (ref 130–400)
POTASSIUM SERPL-MCNC: 4.9 MMOL/L — SIGNIFICANT CHANGE UP (ref 3.5–5)
POTASSIUM SERPL-SCNC: 4.9 MMOL/L — SIGNIFICANT CHANGE UP (ref 3.5–5)
PROT SERPL-MCNC: 5.3 G/DL — LOW (ref 6–8)
PROTHROM AB SERPL-ACNC: 13.1 SEC — HIGH (ref 9.95–12.87)
RBC # BLD: 2.55 M/UL — LOW (ref 4.2–5.4)
RBC # FLD: 17.2 % — HIGH (ref 11.5–14.5)
SODIUM SERPL-SCNC: 151 MMOL/L — HIGH (ref 135–146)
WBC # BLD: 3.43 K/UL — LOW (ref 4.8–10.8)
WBC # FLD AUTO: 3.43 K/UL — LOW (ref 4.8–10.8)

## 2023-01-09 PROCEDURE — 99239 HOSP IP/OBS DSCHRG MGMT >30: CPT

## 2023-01-09 PROCEDURE — 88189 FLOWCYTOMETRY/READ 16 & >: CPT

## 2023-01-09 PROCEDURE — 99233 SBSQ HOSP IP/OBS HIGH 50: CPT

## 2023-01-09 RX ADMIN — Medication 25 MILLIGRAM(S): at 05:25

## 2023-01-09 RX ADMIN — PANTOPRAZOLE SODIUM 10 MG/HR: 20 TABLET, DELAYED RELEASE ORAL at 12:23

## 2023-01-09 RX ADMIN — Medication 500 UNIT(S): at 15:55

## 2023-01-09 RX ADMIN — REMDESIVIR 200 MILLIGRAM(S): 5 INJECTION INTRAVENOUS at 05:26

## 2023-01-09 RX ADMIN — PANTOPRAZOLE SODIUM 10 MG/HR: 20 TABLET, DELAYED RELEASE ORAL at 02:41

## 2023-01-09 NOTE — DISCHARGE NOTE PROVIDER - CARE PROVIDER_API CALL
Monico Hinojosa)  Gastroenterology; Internal Medicine  [address] [place] [address]  Phone: [phone]  Fax: [phone]  Follow Up Time: 2 weeks   Umesh Wetzel)  Gastroenterology; Internal Medicine  [address] [place] [address]  Phone: [phone]  Fax: [phone]  Scheduled Appointment: 01/17/2023

## 2023-01-09 NOTE — PROGRESS NOTE ADULT - SUBJECTIVE AND OBJECTIVE BOX
INTERNAL MEDICINE PROGRESS NOTE  LATRICIA ARREAGA 77y Female  MRN#: [id number]     Hospital Day: 4d  Pt is currently admitted with the primary diagnosis of     SUBJECTIVE  Overnight events     Subjective complaints  Patient was evaluated this morning. Reports                                            OBJECTIVE  PAST MEDICAL & SURGICAL HISTORY  HTN (hypertension)    Heart murmur    Eczema    Anemia  iron infusions and PRBC transfusions    Aortic stenosis    Chronic kidney disease (CKD)    2019 novel coronavirus disease (COVID-19)  4/2020- REHAB admission    H/O appendicitis    H/O cholecystitis  s/p cholecystectomy                                                ALLERGIES:  aspirin (Rash)  latex (Unknown)  penicillins (Unknown)                           HOME MEDICATIONS  Home Medications:  Benadryl 25 mg oral capsule: 1 cap(s) orally once a day (at bedtime), As Needed (06 Jan 2023 00:42)  metoprolol tartrate 25 mg oral tablet: 1 tab(s) orally 2 times a day (14 Oct 2022 16:12)                           MEDICATIONS:  STANDING MEDICATIONS  influenza  Vaccine (HIGH DOSE) 0.7 milliLiter(s) IntraMuscular once  metoprolol tartrate 25 milliGRAM(s) Oral two times a day  pantoprazole Infusion 8 mG/Hr IV Continuous <Continuous>  remdesivir  IVPB   IV Intermittent   remdesivir  IVPB 100 milliGRAM(s) IV Intermittent every 24 hours    PRN MEDICATIONS  acetaminophen     Tablet .. 650 milliGRAM(s) Oral every 6 hours PRN  melatonin 5 milliGRAM(s) Oral at bedtime PRN                                            ------------------------------------------------------------  VITAL SIGNS: Last 24 Hours  T(C): 36.4 (09 Jan 2023 04:22), Max: 36.7 (08 Jan 2023 13:24)  T(F): 97.5 (09 Jan 2023 04:22), Max: 98 (08 Jan 2023 13:24)  HR: 73 (09 Jan 2023 04:22) (72 - 74)  BP: 130/59 (09 Jan 2023 04:22) (127/58 - 144/65)  BP(mean): --  RR: 18 (09 Jan 2023 08:30) (18 - 18)  SpO2: 97% (09 Jan 2023 08:30) (97% - 97%)      01-08-23 @ 07:01  -  01-09-23 @ 07:00  --------------------------------------------------------  IN: 120 mL / OUT: 0 mL / NET: 120 mL                                               LABS:                        7.5    3.43  )-----------( 126      ( 09 Jan 2023 10:15 )             24.0                                                                     RADIOLOGY:    PHYSICAL EXAM:  GENERAL: NAD, lying in bed comfortably  HEAD:  Atraumatic, Normocephalic  EYES: EOMI, PERRLA, conjunctiva and sclera clear  CHEST/LUNG: Clear to auscultation bilaterally; No rales, rhonchi, wheezing, or rubs. Unlabored respirations  HEART: Regular rate and rhythm; No murmurs, rubs, or gallops  ABDOMEN: Bowel Sounds present; Soft, nontender, nondistended  EXTREMITIES:  2+ Peripheral Pulses, brisk capillary refill. No clubbing, cyanosis, or edema  NERVOUS SYSTEM:  A&Ox3, no focal deficits   SKIN: No rashes or lesions                                       ASSESSMENT & PLAN  LATRICIA ARREAGA is a 77y Female with a history significant for X who presented here for X                                                                                      ----------------------------------------------------  # DVT prophylaxis     # GI prophylaxis     # Diet     # Code status     # Disposition                                                                              --------------------------------------------------------    # Handoff      INTERNAL MEDICINE PROGRESS NOTE  LATRICIA ARREAGA 77y Female  MRN#: [id number]     Hospital Day: 4d  Pt is currently admitted with the primary diagnosis of acute on chronic anemia    SUBJECTIVE  Overnight events: none reported    Subjective complaints: n/a  Patient was evaluated this morning. Reports no shortness of breath, fatigue or chest pain.                                            OBJECTIVE  PAST MEDICAL & SURGICAL HISTORY  HTN (hypertension)    Heart murmur    Eczema    Anemia  iron infusions and PRBC transfusions    Aortic stenosis    Chronic kidney disease (CKD)    2019 novel coronavirus disease (COVID-19)  4/2020- REHAB admission    H/O appendicitis    H/O cholecystitis  s/p cholecystectomy                                                ALLERGIES:  aspirin (Rash)  latex (Unknown)  penicillins (Unknown)                           HOME MEDICATIONS  Home Medications:  Benadryl 25 mg oral capsule: 1 cap(s) orally once a day (at bedtime), As Needed (06 Jan 2023 00:42)  metoprolol tartrate 25 mg oral tablet: 1 tab(s) orally 2 times a day (14 Oct 2022 16:12)                           MEDICATIONS:  STANDING MEDICATIONS  influenza  Vaccine (HIGH DOSE) 0.7 milliLiter(s) IntraMuscular once  metoprolol tartrate 25 milliGRAM(s) Oral two times a day  pantoprazole Infusion 8 mG/Hr IV Continuous <Continuous>  remdesivir  IVPB   IV Intermittent   remdesivir  IVPB 100 milliGRAM(s) IV Intermittent every 24 hours    PRN MEDICATIONS  acetaminophen     Tablet .. 650 milliGRAM(s) Oral every 6 hours PRN  melatonin 5 milliGRAM(s) Oral at bedtime PRN                                            ------------------------------------------------------------  VITAL SIGNS: Last 24 Hours  T(C): 36.4 (09 Jan 2023 04:22), Max: 36.7 (08 Jan 2023 13:24)  T(F): 97.5 (09 Jan 2023 04:22), Max: 98 (08 Jan 2023 13:24)  HR: 73 (09 Jan 2023 04:22) (72 - 74)  BP: 130/59 (09 Jan 2023 04:22) (127/58 - 144/65)  BP(mean): --  RR: 18 (09 Jan 2023 08:30) (18 - 18)  SpO2: 97% (09 Jan 2023 08:30) (97% - 97%)      01-08-23 @ 07:01  -  01-09-23 @ 07:00  --------------------------------------------------------  IN: 120 mL / OUT: 0 mL / NET: 120 mL                                               LABS:                        7.5    3.43  )-----------( 126      ( 09 Jan 2023 10:15 )             24.0                                                                     RADIOLOGY:    PHYSICAL EXAM:  GENERAL: NAD, lying in bed comfortably  HEAD:  Atraumatic, Normocephalic  EYES: EOMI, PERRLA, conjunctiva and sclera clear  CHEST/LUNG: Clear to auscultation bilaterally; No rales, rhonchi, wheezing, or rubs. Unlabored respirations  HEART: Regular rate and rhythm; No murmurs, rubs, or gallops  ABDOMEN: Bowel Sounds present; Soft, nontender, nondistended  EXTREMITIES:  2+ Peripheral Pulses, brisk capillary refill. 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 She was given 2 units of blood in the ED, is now hemodynamically stable and is waiting for push enteroscopy scheduled for today. She was also found to be COVID + (on remdesivir) and is asymptomatic.        A/P:   Acute on Chronic Anemia d/t GI bleed  s/p 2 units pRBC  hgb now 7.5 stable  History of GI tract AVMs s/p   Melena  Last Enteroscopy 10/14/22   Angioectasias in the antrum. Multiple small AVMs in duodenal bulb and second part of duodenum. 2 small non-bleeding AVMs were noted in proximal jejunum.  GI plan for enteroscopy on Monday.   Continue Protonix drip     COVID-19 infection:   Asymptomatic, on room air.  Procalcitonin 0.16, D-dimer 274, monitor for now.   Continue Remdesivir.     Macrocytic Anemia:  Thrombocytopenia:   Hematology consult  -b12, folate, MMA all WNL  pending peripheral blood flowcytometry.     Severe Aortic Stenosis  TTE 02/22 -> EF 74%, severe AS  SOB and mild leg edema, will give Lasix one dose today.   Cardiology follow up outpatient.     CKD 4  Mild Hyperkalemia  Cr 27,   monitor BMP.    DVT Prophylaxis: SCD.   #Progress Note Handoff:  Pending (specify): enteroscopy on Monday, monitor Hb.   Family discussion:  Disposition: Home.                                                                                  ----------------------------------------------------  # DVT prophylaxis     # GI prophylaxis     # Diet     # Code status     # Disposition                                                                              --------------------------------------------------------    # Handoff      INTERNAL MEDICINE PROGRESS NOTE  LATRICIA ARREAGA 77y Female  MRN#: [id number]     Hospital Day: 4d  Pt is currently admitted with the primary diagnosis of acute on chronic anemia    SUBJECTIVE  Overnight events: none reported    Subjective complaints: n/a  Patient was evaluated this morning. Reports no shortness of breath, fatigue or chest pain.   Pending endo  Tra stat labs for flow cytometry                                           OBJECTIVE  PAST MEDICAL & SURGICAL HISTORY  HTN (hypertension)    Heart murmur    Eczema    Anemia  iron infusions and PRBC transfusions    Aortic stenosis    Chronic kidney disease (CKD)    2019 novel coronavirus disease (COVID-19)  4/2020- REH[facility] admission    H/O appendicitis    H/O cholecystitis  s/p cholecystectomy                                                ALLERGIES:  aspirin (Rash)  latex (Unknown)  penicillins (Unknown)                           HOME MEDICATIONS  Home Medications:  Benadryl 25 mg oral capsule: 1 cap(s) orally once a day (at bedtime), As Needed (06 Jan 2023 00:42)  metoprolol tartrate 25 mg oral tablet: 1 tab(s) orally 2 times a day (14 Oct 2022 16:12)                           MEDICATIONS:  STANDING MEDICATIONS  influenza  Vaccine (HIGH DOSE) 0.7 milliLiter(s) IntraMuscular once  metoprolol tartrate 25 milliGRAM(s) Oral two times a day  pantoprazole Infusion 8 mG/Hr IV Continuous <Continuous>  remdesivir  IVPB   IV Intermittent   remdesivir  IVPB 100 milliGRAM(s) IV Intermittent every 24 hours    PRN MEDICATIONS  acetaminophen     Tablet .. 650 milliGRAM(s) Oral every 6 hours PRN  melatonin 5 milliGRAM(s) Oral at bedtime PRN                                            ------------------------------------------------------------  VITAL SIGNS: Last 24 Hours  T(C): 36.4 (09 Jan 2023 04:22), Max: 36.7 (08 Jan 2023 13:24)  T(F): 97.5 (09 Jan 2023 04:22), Max: 98 (08 Jan 2023 13:24)  HR: 73 (09 Jan 2023 04:22) (72 - 74)  BP: 130/59 (09 Jan 2023 04:22) (127/58 - 144/65)  BP(mean): --  RR: 18 (09 Jan 2023 08:30) (18 - 18)  SpO2: 97% (09 Jan 2023 08:30) (97% - 97%)      01-08-23 @ 07:01  -  01-09-23 @ 07:00  --------------------------------------------------------  IN: 120 mL / OUT: 0 mL / NET: 120 mL                                               LABS:                        7.5    3.43  )-----------( 126      ( 09 Jan 2023 10:15 )             24.0                                                                     RADIOLOGY:    PHYSICAL EXAM:  GENERAL: NAD, lying in bed comfortably  HEAD:  Atraumatic, Normocephalic  EYES: EOMI, PERRLA, conjunctiva and sclera clear  CHEST/LUNG: Clear to auscultation bilaterally; No rales, rhonchi, wheezing, or rubs. Unlabored respirations  HEART: Regular rate and rhythm; No murmurs, rubs, or gallops  ABDOMEN: Bowel Sounds present; Soft, nontender, nondistended  EXTREMITIES:  2+ Peripheral Pulses, brisk capillary refill. 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 She was given 2 units of blood in the ED, is now hemodynamically stable and is waiting for push enteroscopy scheduled for today. She was also found to be COVID + (on remdesivir) and is asymptomatic.        A/P:   Acute on Chronic Anemia d/t GI bleed  s/p 2 units pRBC  hgb now 7.5 stable  History of GI tract AVMs s/p   Melena  Last Enteroscopy 10/14/22   Angioectasias in the antrum. Multiple small AVMs in duodenal bulb and second part of duodenum. 2 small non-bleeding AVMs were noted in proximal jejunum.  GI plan for enteroscopy on Monday.   Continue Protonix drip     COVID-19 infection:   Asymptomatic, on room air.  Procalcitonin 0.16, D-dimer 274, monitor for now.   Continue Remdesivir.     Macrocytic Anemia:  Thrombocytopenia:   Hematology consult  -b12, folate, MMA all WNL  pending peripheral blood flowcytometry.     Severe Aortic Stenosis  TTE 02/22 -> EF 74%, severe AS  SOB and mild leg edema, will give Lasix one dose today.   Cardiology follow up outpatient.     CKD 4  Mild Hyperkalemia  Cr 27,   monitor BMP.    DVT Prophylaxis: SCD.   #Progress Note Handoff:  Pending (specify): enteroscopy on Monday, monitor Hb.   Family discussion:  Disposition: Home.        # Handoff  - pending enteroscopy & flow cytometry

## 2023-01-09 NOTE — DISCHARGE NOTE PROVIDER - PROVIDER TOKENS
PROVIDER:[TOKEN:[[id number]],FOLLOWUP:[2 weeks]] PROVIDER:[TOKEN:[[id number]],SCHEDULEDAPPT:[01/17/2023]]

## 2023-01-09 NOTE — PROGRESS NOTE ADULT - ASSESSMENT
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 She received 2 RBC in the ED.     A/P:   Acute on Chronic Anemia: Due to blood loss from GI bleeding.   History of GI tract AVMs s/p   Melena:   Patient with multiple GI procedures  Last Enteroscopy 10/14/22   Angiectasias in the antrum. Multiple small AVMs in duodenal bulb and second part of duodenum. 2 small non-bleeding AVMs were noted in proximal jejunum.  GI plan for enteroscopy today.   Continue PPI.     COVID-19 infection:   Asymptomatic, on room air.  Procalcitonin 0.16, D-dimer 274, monitor for now.   Continue Remdesivir for 5 days     Macrocytic Anemia:  Thrombocytopenia:   Hematology consult, check b12, folate, MMA, peripheral blood flowcytometry.     Severe Aortic Stenosis  TTE 02/22 -> EF 74%, severe AS  SOB and mild leg edema, s/p Lasix one dose.    Cardiology follow up outpatient.     CKD 4  Mild Hyperkalemia  Cr 27,   monitor BMP.    DVT Prophylaxis: SCD.   #Progress Note Handoff:  Pending (specify): enteroscopy, monitor Hb.   Family discussion:  Disposition: Home.

## 2023-01-09 NOTE — PROGRESS NOTE ADULT - SUBJECTIVE AND OBJECTIVE BOX
LATRICIA ARREAGA  77y  Female      Patient is a 77y old  Female who presents with a chief complaint of anemia (09 Jan 2023 10:46)      INTERVAL HPI/OVERNIGHT EVENTS:  She feels ok, no new bleeding.   Vital Signs Last 24 Hrs  T(C): 36.6 (09 Jan 2023 13:32), Max: 36.6 (09 Jan 2023 13:32)  T(F): 97.8 (09 Jan 2023 13:32), Max: 97.8 (09 Jan 2023 13:32)  HR: 66 (09 Jan 2023 13:32) (66 - 74)  BP: 131/62 (09 Jan 2023 13:32) (127/58 - 131/62)  BP(mean): --  RR: 18 (09 Jan 2023 13:32) (18 - 18)  SpO2: 97% (09 Jan 2023 08:30) (97% - 97%)    Parameters below as of 09 Jan 2023 08:30  Patient On (Oxygen Delivery Method): room air          01-08-23 @ 07:01 - 01-09-23 @ 07:00  --------------------------------------------------------  IN: 120 mL / OUT: 0 mL / NET: 120 mL    01-09-23 @ 07:01  -  01-09-23 @ 13:53  --------------------------------------------------------  IN: 60 mL / OUT: 400 mL / NET: -340 mL            Consultant(s) Notes Reviewed:  [x ] YES  [ ] NO          MEDICATIONS  (STANDING):  influenza  Vaccine (HIGH DOSE) 0.7 milliLiter(s) IntraMuscular once  metoprolol tartrate 25 milliGRAM(s) Oral two times a day  pantoprazole Infusion 8 mG/Hr (10 mL/Hr) IV Continuous <Continuous>  remdesivir  IVPB   IV Intermittent   remdesivir  IVPB 100 milliGRAM(s) IV Intermittent every 24 hours    MEDICATIONS  (PRN):  acetaminophen     Tablet .. 650 milliGRAM(s) Oral every 6 hours PRN Temp greater or equal to 38C (100.4F), Mild Pain (1 - 3)  melatonin 5 milliGRAM(s) Oral at bedtime PRN Insomnia      LABS                          7.5    3.43  )-----------( 126      ( 09 Jan 2023 10:15 )             24.0     01-09    151<H>  |  119<H>  |  59<H>  ----------------------------<  90  4.9   |  22  |  2.7<H>    Ca    8.4      09 Jan 2023 10:15  Mg     2.2     01-09    TPro  5.3<L>  /  Alb  3.5  /  TBili  0.7  /  DBili  x   /  AST  14  /  ALT  7   /  AlkPhos  75  01-09        PT/INR - ( 09 Jan 2023 10:15 )   PT: 13.10 sec;   INR: 1.14 ratio         PTT - ( 09 Jan 2023 10:15 )  PTT:32.4 sec  Lactate Trend        CAPILLARY BLOOD GLUCOSE            RADIOLOGY & ADDITIONAL TESTS:    Imaging Personally Reviewed:  [ ] YES  [ ] NO    HEALTH ISSUES - PROBLEM Dx:          PHYSICAL EXAM:  GENERAL: NAD, well-developed.  HEAD:  Atraumatic, Normocephalic.  EYES: EOMI, PERRLA, conjunctiva and sclera clear.  NECK: Supple, No JVD.  CHEST/LUNG: Clear to auscultation bilaterally; No wheeze.  HEART: Regular rate and rhythm; S1 S2. SM 3/6 on left sternum.   ABDOMEN: Soft, Nontender, Nondistended; Bowel sounds present.  EXTREMITIES:  2+ Peripheral Pulses, No clubbing, cyanosis, mild leg edema 1+  PSYCH: AAOx3.  NEUROLOGY: non-focal.  SKIN: No rashes or lesions.

## 2023-01-09 NOTE — PROGRESS NOTE ADULT - PROVIDER SPECIALTY LIST ADULT
Hospitalist
Internal Medicine
Gastroenterology
Hospitalist
Hospitalist
Internal Medicine
Internal Medicine

## 2023-01-09 NOTE — PROGRESS NOTE ADULT - SUBJECTIVE AND OBJECTIVE BOX
Gastroenterology progress note:     Patient is a 77y old  Female who presents with a chief complaint of anemia (09 Jan 2023 15:11)       Admitted on: 01-05-23    We are following the patient for anemia      no abdominal pain  or melena     PAST MEDICAL & SURGICAL HISTORY:  HTN (hypertension)      Heart murmur      Eczema      Anemia  iron infusions and PRBC transfusions      Aortic stenosis      Chronic kidney disease (CKD)      2019 novel coronavirus disease (COVID-19)  4/2020- [facility] admission      H/O appendicitis      H/O cholecystitis  s/p cholecystectomy          MEDICATIONS  (STANDING):  influenza  Vaccine (HIGH DOSE) 0.7 milliLiter(s) IntraMuscular once  metoprolol tartrate 25 milliGRAM(s) Oral two times a day  pantoprazole Infusion 8 mG/Hr (10 mL/Hr) IV Continuous <Continuous>  remdesivir  IVPB   IV Intermittent   remdesivir  IVPB 100 milliGRAM(s) IV Intermittent every 24 hours    MEDICATIONS  (PRN):  acetaminophen     Tablet .. 650 milliGRAM(s) Oral every 6 hours PRN Temp greater or equal to 38C (100.4F), Mild Pain (1 - 3)  melatonin 5 milliGRAM(s) Oral at bedtime PRN Insomnia      Allergies  aspirin (Rash)  latex (Unknown)  penicillins (Unknown)      Review of Systems:   Cardiovascular:  No Chest Pain, No Palpitations  Respiratory:  No Cough, No Dyspnea  Gastrointestinal:  As described in HPI  Skin:  No Skin Lesions, No Jaundice  Neuro:  No Syncope, No Dizziness    Physical Examination:  T(C): 36.6 (01-09-23 @ 13:32), Max: 36.6 (01-09-23 @ 13:32)  HR: 66 (01-09-23 @ 13:32) (66 - 74)  BP: 131/62 (01-09-23 @ 13:32) (127/58 - 131/62)  RR: 18 (01-09-23 @ 13:32) (18 - 18)  SpO2: 97% (01-09-23 @ 08:30) (97% - 97%)      01-08-23 @ 07:01  -  01-09-23 @ 07:00  --------------------------------------------------------  IN: 120 mL / OUT: 0 mL / NET: 120 mL    01-09-23 @ 07:01  -  01-09-23 @ 16:12  --------------------------------------------------------  IN: 60 mL / OUT: 400 mL / NET: -340 mL        GENERAL: AAOx3, no acute distress.  HEAD:  Atraumatic, Normocephalic  EYES: conjunctiva and sclera clear  NECK: Supple, no JVD or thyromegaly  CHEST/LUNG: Clear to auscultation bilaterally; No wheeze, rhonchi, or rales  HEART: Regular rate and rhythm; normal S1, S2, No murmurs.  ABDOMEN: Soft, nontender, nondistended; Bowel sounds present  NEUROLOGY: No asterixis or tremor.   SKIN: Intact, no jaundice     Data:                        7.5    3.43  )-----------( 126      ( 09 Jan 2023 10:15 )             24.0     Hgb trend:  7.5  01-09-23 @ 10:15  7.8  01-07-23 @ 20:51  7.6  01-07-23 @ 06:06        01-09    151<H>  |  119<H>  |  59<H>  ----------------------------<  90  4.9   |  22  |  2.7<H>    Ca    8.4      09 Jan 2023 10:15  Mg     2.2     01-09    TPro  5.3<L>  /  Alb  3.5  /  TBili  0.7  /  DBili  x   /  AST  14  /  ALT  7   /  AlkPhos  75  01-09    Liver panel trend:  TBili 0.7   /   AST 14   /   ALT 7   /   AlkP 75   /   Tptn 5.3   /   Alb 3.5    /   DBili --      01-09  TBili 0.5   /   AST 14   /   ALT <5   /   AlkP 65   /   Tptn 5.3   /   Alb 3.1    /   DBili --      01-07  TBili 0.7   /   AST 11   /   ALT 5   /   AlkP 70   /   Tptn 5.5   /   Alb 3.4    /   DBili --      01-06  TBili 0.5   /   AST 11   /   ALT <5   /   AlkP 71   /   Tptn 5.4   /   Alb 3.2    /   DBili --      01-05      PT/INR - ( 09 Jan 2023 10:15 )   PT: 13.10 sec;   INR: 1.14 ratio         PTT - ( 09 Jan 2023 10:15 )  PTT:32.4 sec       Radiology:

## 2023-01-09 NOTE — DISCHARGE NOTE PROVIDER - NSDCMRMEDTOKEN_GEN_ALL_CORE_FT
Benadryl 25 mg oral capsule: 1 cap(s) orally once a day (at bedtime), As Needed  metoprolol tartrate 25 mg oral tablet: 1 tab(s) orally 2 times a day  pantoprazole 40 mg oral delayed release tablet: 1 tab(s) orally 2 times a day

## 2023-01-09 NOTE — PROGRESS NOTE ADULT - ASSESSMENT
76 year old woman with PMHx of Severe AS, Anemia 2/2 obscure GI bleeding (from Duodenum and Gastric AVM capsule endoscopy 02/2022 and enteroscopy 10/14 found to have multiple AVMs in duodenum), DARRIN (follows with Dr Gaston) w/ frequent iron transfusions presents to ED for severe anemia.    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 No diverticulitis or Hematochezia.   Last enteroscopy in 10/14/22: Angiectasia in the antrum, multiple AVMs in the duodenum proximal jejunum AVMs.   Hemoglobin 1/5: 5.6   Hemodynamically stable   Platelets 97, Leukopenia     PLAN   trend Hb q12h  Transfuse prn to keep hemoglobin > 8  2 large bore IVs  monitor for objective evidence of bleeding  Appreciate Hematology recommendations  Push enteroscopy as outpatient on Tuesday 1/17/2023, Patient refusing to stay for the procedure

## 2023-01-09 NOTE — DISCHARGE NOTE NURSING/CASE MANAGEMENT/SOCIAL WORK - PATIENT PORTAL LINK FT
You can access the FollowMyHealth Patient Portal offered by [facility] by registering at the following website: [email or web address]. By joining [facility]’s FollowMyHealth portal, you will also be able to view your health information using other applications (apps) compatible with our system.

## 2023-01-09 NOTE — DISCHARGE NOTE PROVIDER - NSDCFUSCHEDAPPT_GEN_ALL_CORE_FT
[facility]  [facility] [address]   Scheduled Appointment: 01/12/2023    [facility]  [facility] [address]   Scheduled Appointment: 01/19/2023    Umesh Wetzel  [facility]  [facility] Doc Off [id number]  Scheduled Appointment: 01/23/2023     [facility]  [facility] [address]   Scheduled Appointment: 01/12/2023    [facility]  [facility] [address]   Scheduled Appointment: 01/13/2023    [facility]  [facility] [address]   Scheduled Appointment: 01/19/2023    [facility]  [facility] [address]   Scheduled Appointment: 01/20/2023    Umesh Wetzel  [facility]  [facility] Doc [facility] [id number]  Scheduled Appointment: 01/23/2023

## 2023-01-09 NOTE — DISCHARGE NOTE PROVIDER - HOSPITAL COURSE
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 Patient was noted to have Hgb of 5.8 on outpatient labs and was sent by her oncologist for evaluation. Patient endorses SOB and cough for the past 4 days, saw her outpatient doctor and was prescribed prednisone 20mg tablets that she only took for 2 days. Patient also endorsed semi-formed dark stools occasionally. Of note, patient has history of GI bleeding with enteroscopy performed 10/14 found to have small bleeding diffuse angioectasias in stomach and multiple small AVMs in the duodenal bulb and proximal jejunum. Denies any headache, fevers, chills, chest pain, N/V/D/C.     ED vitals:  /56, HR 79, Temp 96.6F, satting 100%   Labs: WBC 1.56, Hgb 5.6, Plt 97, COVID positive    s/p 2 units pRBC. Started on Protonix drip  Admitted for acute symptomatic anemia.    Hospital course: Pt completed 4/5 days of RDV for asymptomatic COVID.   Per patient's wishes, GI recommends having patient schedule enteroscopy outpatient. Hemodynamics and hemoglobin remained stable while inpatient.   On discharge, patient will continue with 40 PO Protonix    Patient was treated for:    Acute on Chronic Anemia: Due to blood loss from GI bleeding.   History of GI tract AVMs s/p   Melena:   Patient with multiple GI procedures  Last Enteroscopy 10/14/22   Angiectasias in the antrum. Multiple small AVMs in duodenal bulb and second part of duodenum. 2 small non-bleeding AVMs were noted in proximal jejunum.      COVID-19 infection:   Asymptomatic, on room air.  Procalcitonin 0.16, D-dimer 274, monitor for now.   Continue Remdesivir for 5 days     Macrocytic Anemia:  Thrombocytopenia:   Hematology consult, check b12, folate, MMA, peripheral blood flow cytometry.     Severe Aortic Stenosis  TTE 02/22 -> EF 74%, severe AS  SOB and mild leg edema, s/p Lasix one dose.    Cardiology follow up outpatient.     CKD 4  Mild Hyperkalemia  Cr 27,   monitor BMP.   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 Patient was noted to have Hgb of 5.8 on outpatient labs and was sent by her oncologist for evaluation. Patient endorses SOB and cough for the past 4 days, saw her outpatient doctor and was prescribed prednisone 20mg tablets that she only took for 2 days. Patient also endorsed semi-formed dark stools occasionally. Of note, patient has history of GI bleeding with enteroscopy performed 10/14 found to have small bleeding diffuse angioectasias in stomach and multiple small AVMs in the duodenal bulb and proximal jejunum. Denies any headache, fevers, chills, chest pain, N/V/D/C.     ED vitals:  /56, HR 79, Temp 96.6F, satting 100%   Labs: WBC 1.56, Hgb 5.6, Plt 97, COVID positive    s/p 2 units pRBC. Started on Protonix drip  Admitted for acute symptomatic anemia.    Hospital course: Pt completed 4/5 days of RDV for asymptomatic COVID.   Per patient's wishes, GI recommends having patient schedule enteroscopy outpatient. Hemodynamics and hemoglobin remained stable while inpatient.   On discharge, patient will continue with 40 PO Protonix    Patient was treated for:    Acute on Chronic Anemia: Due to blood loss from GI bleeding.   History of GI tract AVMs s/p   Melena:   Patient with multiple GI procedures  Last Enteroscopy 10/14/22   Angiectasias in the antrum. Multiple small AVMs in duodenal bulb and second part of duodenum. 2 small non-bleeding AVMs were noted in proximal jejunum.      COVID-19 infection:   Asymptomatic, on room air.  Procalcitonin 0.16, D-dimer 274, monitor for now.   s/p Remdesivir for 4 days     Macrocytic Anemia:  Thrombocytopenia:   B12 and Folate normal, follow up MMA, peripheral blood flow cytometry.   Hematology follow up outpatient.     Severe Aortic Stenosis  TTE 02/22 -> EF 74%, severe AS  SOB and mild leg edema, s/p Lasix one dose.    Cardiology follow up outpatient.     CKD 4  Mild Hyperkalemia  Cr 2.7,   monitor BMP.

## 2023-01-09 NOTE — DISCHARGE NOTE PROVIDER - NSDCCPCAREPLAN_GEN_ALL_CORE_FT
PRINCIPAL DISCHARGE DIAGNOSIS  Diagnosis: Anemia  Assessment and Plan of Treatment: You are diagnosed with anemia, which is possibly due to a gastrointestinal bleed. Please follow up with your gastroenterologist within 1-2 weeks to schedule outpatient enteroscopy.  Anemia is a condition in which the concentration of red blood cells or hemoglobin in the blood is below normal. Hemoglobin is a substance in red blood cells that carries oxygen to the tissues of the body. Anemia results in not enough oxygen reaching these tissues which can cause symptoms such as weakness, dizziness/lightheadedness, shortness of breath, chest pain, paleness, or nausea. The cause of your anemia may or may not be determined immediately. If your hemoglobin was dangerously low, you may have received a blood transfusion. Usually reactions to transfusions occur immediately but monitor yourself for any fevers, rash, or shortness of breath.  SEEK IMMEDIATE MEDICAL CARE IF YOU HAVE ANY OF THE FOLLOWING SYMPTOMS: extreme weakness/chest pain/shortness of breath, black or bloody stools, vomiting blood, fainting, fever, or any signs of dehydration.

## 2023-01-12 ENCOUNTER — LABORATORY RESULT (OUTPATIENT)
Age: 78
End: 2023-01-12

## 2023-01-12 ENCOUNTER — APPOINTMENT (OUTPATIENT)
Dept: INFUSION THERAPY | Facility: CLINIC | Age: 78
End: 2023-01-12

## 2023-01-12 DIAGNOSIS — I12.9 HYPERTENSIVE CHRONIC KIDNEY DISEASE WITH STAGE 1 THROUGH STAGE 4 CHRONIC KIDNEY DISEASE, OR UNSPECIFIED CHRONIC KIDNEY DISEASE: ICD-10-CM

## 2023-01-12 DIAGNOSIS — Z86.16 PERSONAL HISTORY OF COVID-19: ICD-10-CM

## 2023-01-12 DIAGNOSIS — R60.0 LOCALIZED EDEMA: ICD-10-CM

## 2023-01-12 DIAGNOSIS — I35.0 NONRHEUMATIC AORTIC (VALVE) STENOSIS: ICD-10-CM

## 2023-01-12 DIAGNOSIS — D72.819 DECREASED WHITE BLOOD CELL COUNT, UNSPECIFIED: ICD-10-CM

## 2023-01-12 DIAGNOSIS — Z88.0 ALLERGY STATUS TO PENICILLIN: ICD-10-CM

## 2023-01-12 DIAGNOSIS — U07.1 COVID-19: ICD-10-CM

## 2023-01-12 DIAGNOSIS — Z79.82 LONG TERM (CURRENT) USE OF ASPIRIN: ICD-10-CM

## 2023-01-12 DIAGNOSIS — D63.1 ANEMIA IN CHRONIC KIDNEY DISEASE: ICD-10-CM

## 2023-01-12 DIAGNOSIS — D69.6 THROMBOCYTOPENIA, UNSPECIFIED: ICD-10-CM

## 2023-01-12 DIAGNOSIS — D64.9 ANEMIA, UNSPECIFIED: ICD-10-CM

## 2023-01-12 DIAGNOSIS — K31.819 ANGIODYSPLASIA OF STOMACH AND DUODENUM WITHOUT BLEEDING: ICD-10-CM

## 2023-01-12 DIAGNOSIS — K92.2 GASTROINTESTINAL HEMORRHAGE, UNSPECIFIED: ICD-10-CM

## 2023-01-12 DIAGNOSIS — E87.5 HYPERKALEMIA: ICD-10-CM

## 2023-01-12 DIAGNOSIS — D62 ACUTE POSTHEMORRHAGIC ANEMIA: ICD-10-CM

## 2023-01-12 DIAGNOSIS — J12.82 PNEUMONIA DUE TO CORONAVIRUS DISEASE 2019: ICD-10-CM

## 2023-01-12 DIAGNOSIS — N18.4 CHRONIC KIDNEY DISEASE, STAGE 4 (SEVERE): ICD-10-CM

## 2023-01-12 LAB
HOMOCYSTEINE LEVEL: 21.2 UMOL/L — HIGH
METHYLMALONIC ACID LEVEL: 804 NMOL/L — HIGH (ref 87–318)

## 2023-01-12 RX ORDER — ERYTHROPOIETIN 10000 [IU]/ML
30000 INJECTION, SOLUTION INTRAVENOUS; SUBCUTANEOUS ONCE
Refills: 0 | Status: COMPLETED | OUTPATIENT
Start: 2023-01-12 | End: 2023-01-12

## 2023-01-12 RX ORDER — IRON SUCROSE 20 MG/ML
200 INJECTION, SOLUTION INTRAVENOUS ONCE
Refills: 0 | Status: COMPLETED | OUTPATIENT
Start: 2023-01-12 | End: 2023-01-12

## 2023-01-12 RX ORDER — HYDROCORTISONE 20 MG
100 TABLET ORAL ONCE
Refills: 0 | Status: COMPLETED | OUTPATIENT
Start: 2023-01-12 | End: 2023-01-12

## 2023-01-12 RX ORDER — ACETAMINOPHEN 500 MG
650 TABLET ORAL ONCE
Refills: 0 | Status: COMPLETED | OUTPATIENT
Start: 2023-01-12 | End: 2023-01-12

## 2023-01-12 RX ADMIN — IRON SUCROSE 200 MILLIGRAM(S): 20 INJECTION, SOLUTION INTRAVENOUS at 14:08

## 2023-01-12 RX ADMIN — Medication 100 MILLIGRAM(S): at 13:12

## 2023-01-12 RX ADMIN — IRON SUCROSE 110 MILLIGRAM(S): 20 INJECTION, SOLUTION INTRAVENOUS at 13:38

## 2023-01-12 RX ADMIN — Medication 650 MILLIGRAM(S): at 13:12

## 2023-01-12 RX ADMIN — ERYTHROPOIETIN 30000 UNIT(S): 10000 INJECTION, SOLUTION INTRAVENOUS; SUBCUTANEOUS at 13:11

## 2023-01-12 RX ADMIN — Medication 100 MILLIGRAM(S): at 13:35

## 2023-01-13 ENCOUNTER — APPOINTMENT (OUTPATIENT)
Dept: INFUSION THERAPY | Facility: CLINIC | Age: 78
End: 2023-01-13

## 2023-01-13 LAB
HCT VFR BLD CALC: 27.8 %
HGB BLD-MCNC: 8.1 G/DL
MCHC RBC-ENTMCNC: 27.1 PG
MCHC RBC-ENTMCNC: 29.1 G/DL
MCV RBC AUTO: 93 FL
PLATELET # BLD AUTO: 145 K/UL
PMV BLD: 10.5 FL
RBC # BLD: 2.99 M/UL
RBC # FLD: 16.4 %
WBC # FLD AUTO: 3.77 K/UL

## 2023-01-19 ENCOUNTER — LABORATORY RESULT (OUTPATIENT)
Age: 78
End: 2023-01-19

## 2023-01-19 ENCOUNTER — APPOINTMENT (OUTPATIENT)
Dept: INFUSION THERAPY | Facility: CLINIC | Age: 78
End: 2023-01-19
Payer: MEDICARE

## 2023-01-19 PROCEDURE — 86077 PHYS BLOOD BANK SERV XMATCH: CPT

## 2023-01-19 RX ORDER — ERYTHROPOIETIN 10000 [IU]/ML
30000 INJECTION, SOLUTION INTRAVENOUS; SUBCUTANEOUS ONCE
Refills: 0 | Status: COMPLETED | OUTPATIENT
Start: 2023-01-19 | End: 2023-02-16

## 2023-01-19 RX ADMIN — ERYTHROPOIETIN 30000 UNIT(S): 10000 INJECTION, SOLUTION INTRAVENOUS; SUBCUTANEOUS at 14:16

## 2023-01-20 ENCOUNTER — OUTPATIENT (OUTPATIENT)
Dept: OUTPATIENT SERVICES | Facility: HOSPITAL | Age: 78
LOS: 1 days | Discharge: HOME | End: 2023-01-20

## 2023-01-20 ENCOUNTER — APPOINTMENT (OUTPATIENT)
Dept: INFUSION THERAPY | Facility: CLINIC | Age: 78
End: 2023-01-20

## 2023-01-20 DIAGNOSIS — D64.9 ANEMIA, UNSPECIFIED: ICD-10-CM

## 2023-01-20 DIAGNOSIS — Z87.19 PERSONAL HISTORY OF OTHER DISEASES OF THE DIGESTIVE SYSTEM: Chronic | ICD-10-CM

## 2023-01-20 LAB
ABO + RH PNL BLD: NORMAL
BLD GP AB SCN SERPL QL: NORMAL
FERRITIN SERPL-MCNC: 61 NG/ML
HCT VFR BLD CALC: 25.1 %
HGB BLD-MCNC: 7.2 G/DL
MCHC RBC-ENTMCNC: 27.3 PG
MCHC RBC-ENTMCNC: 28.7 G/DL
MCV RBC AUTO: 95.1 FL
PLATELET # BLD AUTO: 106 K/UL
PMV BLD: 12 FL
RBC # BLD: 2.64 M/UL
RBC # FLD: 17.3 %
WBC # FLD AUTO: 4.67 K/UL

## 2023-01-23 ENCOUNTER — APPOINTMENT (OUTPATIENT)
Dept: GASTROENTEROLOGY | Facility: CLINIC | Age: 78
End: 2023-01-23
Payer: MEDICARE

## 2023-01-23 DIAGNOSIS — Z86.79 PERSONAL HISTORY OF OTHER DISEASES OF THE CIRCULATORY SYSTEM: ICD-10-CM

## 2023-01-23 DIAGNOSIS — Z78.9 OTHER SPECIFIED HEALTH STATUS: ICD-10-CM

## 2023-01-23 DIAGNOSIS — Z87.19 PERSONAL HISTORY OF OTHER DISEASES OF THE DIGESTIVE SYSTEM: ICD-10-CM

## 2023-01-23 DIAGNOSIS — Z87.39 PERSONAL HISTORY OF OTHER DISEASES OF THE MUSCULOSKELETAL SYSTEM AND CONNECTIVE TISSUE: ICD-10-CM

## 2023-01-23 PROCEDURE — 99214 OFFICE O/P EST MOD 30 MIN: CPT

## 2023-01-23 PROCEDURE — 99204 OFFICE O/P NEW MOD 45 MIN: CPT

## 2023-01-23 RX ORDER — SODIUM BICARBONATE 650 MG/1
650 TABLET ORAL
Qty: 90 | Refills: 0 | Status: ACTIVE | COMMUNITY
Start: 2022-10-16

## 2023-01-26 ENCOUNTER — LABORATORY RESULT (OUTPATIENT)
Age: 78
End: 2023-01-26

## 2023-01-26 ENCOUNTER — APPOINTMENT (OUTPATIENT)
Dept: INFUSION THERAPY | Facility: CLINIC | Age: 78
End: 2023-01-26

## 2023-01-26 LAB
HCT VFR BLD CALC: 30.5 %
HGB BLD-MCNC: 9.1 G/DL
MCHC RBC-ENTMCNC: 27.8 PG
MCHC RBC-ENTMCNC: 29.8 G/DL
MCV RBC AUTO: 93.3 FL
PLATELET # BLD AUTO: 82 K/UL
PMV BLD: 12.7 FL
RBC # BLD: 3.27 M/UL
RBC # FLD: 17.3 %
WBC # FLD AUTO: 5.1 K/UL

## 2023-01-26 RX ORDER — HYDROCORTISONE 20 MG
100 TABLET ORAL ONCE
Refills: 0 | Status: COMPLETED | OUTPATIENT
Start: 2023-01-26 | End: 2023-01-26

## 2023-01-26 RX ORDER — IRON SUCROSE 20 MG/ML
200 INJECTION, SOLUTION INTRAVENOUS ONCE
Refills: 0 | Status: COMPLETED | OUTPATIENT
Start: 2023-01-26 | End: 2023-01-26

## 2023-01-26 RX ORDER — ACETAMINOPHEN 500 MG
650 TABLET ORAL ONCE
Refills: 0 | Status: COMPLETED | OUTPATIENT
Start: 2023-01-26 | End: 2023-01-26

## 2023-01-26 RX ORDER — ERYTHROPOIETIN 10000 [IU]/ML
30000 INJECTION, SOLUTION INTRAVENOUS; SUBCUTANEOUS ONCE
Refills: 0 | Status: COMPLETED | OUTPATIENT
Start: 2023-01-26 | End: 2023-01-26

## 2023-01-26 RX ADMIN — IRON SUCROSE 110 MILLIGRAM(S): 20 INJECTION, SOLUTION INTRAVENOUS at 13:51

## 2023-01-26 RX ADMIN — Medication 650 MILLIGRAM(S): at 13:52

## 2023-01-26 RX ADMIN — ERYTHROPOIETIN 30000 UNIT(S): 10000 INJECTION, SOLUTION INTRAVENOUS; SUBCUTANEOUS at 13:51

## 2023-01-26 RX ADMIN — Medication 100 MILLIGRAM(S): at 13:52

## 2023-01-27 ENCOUNTER — APPOINTMENT (OUTPATIENT)
Dept: INFUSION THERAPY | Facility: CLINIC | Age: 78
End: 2023-01-27

## 2023-02-02 ENCOUNTER — LABORATORY RESULT (OUTPATIENT)
Age: 78
End: 2023-02-02

## 2023-02-02 ENCOUNTER — APPOINTMENT (OUTPATIENT)
Dept: INFUSION THERAPY | Facility: CLINIC | Age: 78
End: 2023-02-02

## 2023-02-02 LAB
HCT VFR BLD CALC: 28.5 %
HGB BLD-MCNC: 8.3 G/DL
MCHC RBC-ENTMCNC: 27.1 PG
MCHC RBC-ENTMCNC: 29.1 G/DL
MCV RBC AUTO: 93.1 FL
PLATELET # BLD AUTO: 86 K/UL
PMV BLD: 9.3 FL
RBC # BLD: 3.06 M/UL
RBC # FLD: 17.7 %
WBC # FLD AUTO: 2.85 K/UL

## 2023-02-02 RX ORDER — ERYTHROPOIETIN 10000 [IU]/ML
30000 INJECTION, SOLUTION INTRAVENOUS; SUBCUTANEOUS ONCE
Refills: 0 | Status: COMPLETED | OUTPATIENT
Start: 2023-02-02 | End: 2023-02-02

## 2023-02-02 RX ADMIN — ERYTHROPOIETIN 30000 UNIT(S): 10000 INJECTION, SOLUTION INTRAVENOUS; SUBCUTANEOUS at 12:05

## 2023-02-02 NOTE — PHYSICAL EXAM
[Alert] : alert [Normal Voice/Communication] : normal voice/communication [No Acute Distress] : no acute distress [Obese (BMI >= 30)] : obese (BMI >= 30) [Sclera] : the sclera and conjunctiva were normal [None] : no edema [Normal] : normal bowel sounds, non-tender, no masses, soft, no no hepato-splenomegaly [Oriented To Time, Place, And Person] : oriented to person, place, and time [de-identified] : Pale, in wheelchair [de-identified] : Pale

## 2023-02-02 NOTE — REVIEW OF SYSTEMS
[Feeling Tired] : feeling tired [Recent Weight Gain (___ Lbs)] : recent [unfilled] ~Ulb weight gain [As Noted in HPI] : as noted in HPI [Abdominal Pain] : abdominal pain [Constipation] : constipation [Bleeding] : bleeding [Negative] : Heme/Lymph [Fever] : no fever [Chills] : no chills [Feeling Poorly] : not feeling poorly [Recent Weight Loss (___ Lbs)] : no recent weight loss [Vomiting] : no vomiting [Diarrhea] : no diarrhea [Bloating (gassiness)] : no bloating

## 2023-02-02 NOTE — ASSESSMENT
[FreeTextEntry1] : 77 yr old female with Anemia requiring frequent blood transfusions and Iron infusions q  2 weeks was referred here by Hematologist Dr. Gaston for a capsule endoscopy. S/P hospitalization 1/6/23-1/9/23 for Anemia and an outpatient GI W/U was recommended. \par \par Persistent severe Anemia with multiple AVMs in the duodenum due to Aortic Stenosis\par \par - Will schedule tentatively an EGD and Colonoscopy and if nonrevealing, can then do a capsule endoscopy and enteroscopy. All risks and benefits of the procedure were explained to patient today. She was told to see her Cardiologist, Dr. Nisa dave for Cardiology clearance for the procedures.\par - Suprep \par - F/U after the procedures are done\par - She was also advised to see the Cardiothoracic Surgeon to evaluate for AS valve replacement since the bleeding  AVMs will keep recurring\par - F/U with Hematology

## 2023-02-02 NOTE — REASON FOR VISIT
[Consultation] : a consultation visit [FreeTextEntry1] : NP- Ref by  for anemia and possible Capsule study

## 2023-02-03 ENCOUNTER — APPOINTMENT (OUTPATIENT)
Dept: INFUSION THERAPY | Facility: CLINIC | Age: 78
End: 2023-02-03

## 2023-02-03 ENCOUNTER — OUTPATIENT (OUTPATIENT)
Dept: OUTPATIENT SERVICES | Facility: HOSPITAL | Age: 78
LOS: 1 days | End: 2023-02-03

## 2023-02-03 DIAGNOSIS — Z87.19 PERSONAL HISTORY OF OTHER DISEASES OF THE DIGESTIVE SYSTEM: Chronic | ICD-10-CM

## 2023-02-03 DIAGNOSIS — D64.9 ANEMIA, UNSPECIFIED: ICD-10-CM

## 2023-02-03 LAB
ABO + RH PNL BLD: NORMAL
BLD GP AB SCN SERPL QL: NORMAL

## 2023-02-09 ENCOUNTER — APPOINTMENT (OUTPATIENT)
Dept: HEMATOLOGY ONCOLOGY | Facility: CLINIC | Age: 78
End: 2023-02-09

## 2023-02-09 ENCOUNTER — LABORATORY RESULT (OUTPATIENT)
Age: 78
End: 2023-02-09

## 2023-02-09 ENCOUNTER — APPOINTMENT (OUTPATIENT)
Dept: INFUSION THERAPY | Facility: CLINIC | Age: 78
End: 2023-02-09
Payer: MEDICARE

## 2023-02-09 ENCOUNTER — APPOINTMENT (OUTPATIENT)
Dept: HEMATOLOGY ONCOLOGY | Facility: CLINIC | Age: 78
End: 2023-02-09
Payer: MEDICARE

## 2023-02-09 ENCOUNTER — APPOINTMENT (OUTPATIENT)
Dept: INFUSION THERAPY | Facility: CLINIC | Age: 78
End: 2023-02-09

## 2023-02-09 ENCOUNTER — OUTPATIENT (OUTPATIENT)
Dept: OUTPATIENT SERVICES | Facility: HOSPITAL | Age: 78
LOS: 1 days | End: 2023-02-09
Payer: MEDICARE

## 2023-02-09 VITALS
SYSTOLIC BLOOD PRESSURE: 158 MMHG | HEART RATE: 84 BPM | WEIGHT: 234 LBS | OXYGEN SATURATION: 98 % | HEIGHT: 66 IN | BODY MASS INDEX: 37.61 KG/M2 | DIASTOLIC BLOOD PRESSURE: 65 MMHG | RESPIRATION RATE: 16 BRPM | TEMPERATURE: 97.1 F

## 2023-02-09 DIAGNOSIS — Z87.19 PERSONAL HISTORY OF OTHER DISEASES OF THE DIGESTIVE SYSTEM: Chronic | ICD-10-CM

## 2023-02-09 DIAGNOSIS — D64.9 ANEMIA, UNSPECIFIED: ICD-10-CM

## 2023-02-09 LAB
HCT VFR BLD CALC: 26.1 %
HGB BLD-MCNC: 7.8 G/DL
MCHC RBC-ENTMCNC: 27.7 PG
MCHC RBC-ENTMCNC: 29.9 G/DL
MCV RBC AUTO: 92.6 FL
PLATELET # BLD AUTO: 110 K/UL
PMV BLD: 11.4 FL
RBC # BLD: 2.82 M/UL
RBC # FLD: 16.2 %
WBC # FLD AUTO: 2.77 K/UL

## 2023-02-09 PROCEDURE — 99213 OFFICE O/P EST LOW 20 MIN: CPT

## 2023-02-09 PROCEDURE — 82728 ASSAY OF FERRITIN: CPT

## 2023-02-09 PROCEDURE — 86922 COMPATIBILITY TEST ANTIGLOB: CPT

## 2023-02-09 PROCEDURE — 85245 CLOT FACTOR VIII VW RISTOCTN: CPT

## 2023-02-09 PROCEDURE — 85247 CLOT FACTOR VIII MULTIMETRIC: CPT

## 2023-02-09 PROCEDURE — 80053 COMPREHEN METABOLIC PANEL: CPT

## 2023-02-09 PROCEDURE — 86850 RBC ANTIBODY SCREEN: CPT

## 2023-02-09 PROCEDURE — 85027 COMPLETE CBC AUTOMATED: CPT

## 2023-02-09 PROCEDURE — 86901 BLOOD TYPING SEROLOGIC RH(D): CPT

## 2023-02-09 PROCEDURE — 85246 CLOT FACTOR VIII VW ANTIGEN: CPT

## 2023-02-09 PROCEDURE — 36415 COLL VENOUS BLD VENIPUNCTURE: CPT

## 2023-02-09 PROCEDURE — 96372 THER/PROPH/DIAG INJ SC/IM: CPT

## 2023-02-09 PROCEDURE — 86902 BLOOD TYPE ANTIGEN DONOR EA: CPT

## 2023-02-09 PROCEDURE — 86900 BLOOD TYPING SEROLOGIC ABO: CPT

## 2023-02-09 PROCEDURE — 96365 THER/PROPH/DIAG IV INF INIT: CPT

## 2023-02-09 PROCEDURE — 99213 OFFICE O/P EST LOW 20 MIN: CPT | Mod: 25

## 2023-02-09 RX ORDER — ACETAMINOPHEN 500 MG
650 TABLET ORAL ONCE
Refills: 0 | Status: COMPLETED | OUTPATIENT
Start: 2023-02-09 | End: 2023-02-09

## 2023-02-09 RX ORDER — IRON SUCROSE 20 MG/ML
200 INJECTION, SOLUTION INTRAVENOUS ONCE
Refills: 0 | Status: COMPLETED | OUTPATIENT
Start: 2023-02-09 | End: 2023-02-09

## 2023-02-09 RX ORDER — ERYTHROPOIETIN 10000 [IU]/ML
30000 INJECTION, SOLUTION INTRAVENOUS; SUBCUTANEOUS ONCE
Refills: 0 | Status: COMPLETED | OUTPATIENT
Start: 2023-02-09 | End: 2023-02-09

## 2023-02-09 RX ADMIN — Medication 650 MILLIGRAM(S): at 13:51

## 2023-02-09 RX ADMIN — ERYTHROPOIETIN 30000 UNIT(S): 10000 INJECTION, SOLUTION INTRAVENOUS; SUBCUTANEOUS at 13:52

## 2023-02-09 RX ADMIN — ERYTHROPOIETIN 30000 UNIT(S): 10000 INJECTION, SOLUTION INTRAVENOUS; SUBCUTANEOUS at 15:59

## 2023-02-09 RX ADMIN — Medication 650 MILLIGRAM(S): at 15:59

## 2023-02-09 RX ADMIN — IRON SUCROSE 110 MILLIGRAM(S): 20 INJECTION, SOLUTION INTRAVENOUS at 13:53

## 2023-02-10 ENCOUNTER — APPOINTMENT (OUTPATIENT)
Dept: INFUSION THERAPY | Facility: CLINIC | Age: 78
End: 2023-02-10

## 2023-02-10 ENCOUNTER — OUTPATIENT (OUTPATIENT)
Dept: OUTPATIENT SERVICES | Facility: HOSPITAL | Age: 78
LOS: 1 days | End: 2023-02-10
Payer: MEDICARE

## 2023-02-10 DIAGNOSIS — Z87.19 PERSONAL HISTORY OF OTHER DISEASES OF THE DIGESTIVE SYSTEM: Chronic | ICD-10-CM

## 2023-02-10 DIAGNOSIS — D64.9 ANEMIA, UNSPECIFIED: ICD-10-CM

## 2023-02-10 LAB
ABO + RH PNL BLD: NORMAL
ALBUMIN SERPL ELPH-MCNC: 3.3 G/DL
ALP BLD-CCNC: 68 U/L
ALT SERPL-CCNC: 5 U/L
ANION GAP SERPL CALC-SCNC: 7 MMOL/L
AST SERPL-CCNC: 11 U/L
BILIRUB SERPL-MCNC: 0.6 MG/DL
BLD GP AB SCN SERPL QL: NORMAL
BUN SERPL-MCNC: 46 MG/DL
CALCIUM SERPL-MCNC: 8.6 MG/DL
CHLORIDE SERPL-SCNC: 112 MMOL/L
CO2 SERPL-SCNC: 23 MMOL/L
CREAT SERPL-MCNC: 2.2 MG/DL
EGFR: 22 ML/MIN/1.73M2
FERRITIN SERPL-MCNC: 38 NG/ML
GLUCOSE SERPL-MCNC: 79 MG/DL
POTASSIUM SERPL-SCNC: 5.2 MMOL/L
PROT SERPL-MCNC: 5.3 G/DL
SODIUM SERPL-SCNC: 142 MMOL/L
VWF AG PPP IA-ACNC: 227 %
VWF:RCO ACT/NOR PPP PL AGG: 160 %

## 2023-02-10 PROCEDURE — 36430 TRANSFUSION BLD/BLD COMPNT: CPT

## 2023-02-10 PROCEDURE — P9040: CPT

## 2023-02-11 NOTE — ASSESSMENT
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 ) , \par peripheral edema . \par Constipation , rectal bleeding ( hemorrhoids ? 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 Prominent mesenteric lymph nodes . PET scan from 8/9/22 no suspicious lesions\par \par s/p push enteroscopy 10/14/22 with a few small GAVE noted in antrum, APC applied with hemostasis achieved; in duodenum small AVMs seen and APC applied with hemostasis, 2 non bleeding AVMs seen in jejunum \par \par decreased peripheral edema , diuretics held by PMD . \par \par \par Plan:\par --S/P PRBCs last week and Venofer x3. CBC today reviewed, Hgb 7.8\par --Will proceed with Retacrit 30,000 units and Venofer x1 today. Scheduled for 1 unit of PRBCs tomorrow 2/10\par --Will draw CMP, ferritin, and Von Willebrand factor activity today and follow up with results. Will likely schedule for more iron infusions after reviewing ferritin results.\par --Follow up with GI as scheduled for possible EGD/colonoscopy on 3/15 (pending cardiac clearance)\par \par RTC in 2 months\par \par Patient was seen and examined and discussed with Dr. Gaston who agrees to the above plan of care.\par \par

## 2023-02-11 NOTE — PHYSICAL EXAM
[Obese] : obese [Normal] : no peripheral adenopathy appreciated [de-identified] :  no acute distress.  [de-identified] : grade 3/6 systolic murmur  [de-identified] : lower extremity edema ( R > L ) 1 +

## 2023-02-11 NOTE — HISTORY OF PRESENT ILLNESS
[de-identified] : bruce is a 74 year old white female with hypertension, chronic kidney disease, morbidly obese presents today with normocytic anemia. \par Her most recent CBC 04/01/2020 shows WBC -5.66, HB of 6.7, MCV -86.3, RDW - 17.3, platelet count- 196. Her Hb was normal until 08/2019. In October 2019 she noticed black tarry stools and was feeling tired. She went to ER and her Hb was 5.3. She was given 3 units of PRBC. Her iron studies at that showed ferritin of 8 and percent saturation of 5. She had EGD and colonoscopy which did not reveal any bleeding lesions. Following that event as per patient she was not given iron (?not sure why). She was readmitted to hospital in 01/2020 for SOB on exertion and her HB was noted to 6.0 again. She was given 2 units and her iron studies were consistent with DARRIN. B12 and folate were normal. Immunofixation showed weak IgG lambda migrating para protein. Free light chain ratio 2.1. Normal calcium. \par EGD and VCE was done. EGD revealed gastritis and VCE showed bleeding in small bowel. \par She was started on oral iron and she took for about three months. \par Her latest ferritin done in feb 2020 was 24 and percent saturation was 72% and her hb improved to 8.8. She also has push enteroscopy in 02/2020 and no bleeding lesions were found. Was recommended to have repeat colonoscopy and double balloon enteroscopy if she bleeds again.\par She went for blood work again on 04/01/2020 as she was feeling weak and having SOB and her hb dropped to 6.7. She was told by PMD to start on PROCRIT 10,000 units M-W-F. She was told her iron levels are good and she can stop iron. \par \par Today she feels very tired and her SOB is worse. She denies any melena or BRBPR in last few days. She does have anal fissure and hemorrhoids and bleeds when she is constipated. She denies hematuria or post menopausal bleeding. Denies weight loss. Does not take any NSAIDs or aspirin. She has not followed up with nephrology before.\par Family history is significant for breast cancer in her mother. She is a former smoker stopped 20 years ago.\par She is uptodate with mammogram and Pap smear. \par  [de-identified] : 09/17/2020 Patient returns for follow up , she feels better after venofer X 4 and on EPO 10,000 weekly , Hb is up to 10 . \par \par 10/15/2020 Patient returns for follow up 2 weeks after last dose of EPO , today's Hb is 10.7 . she offers no new complaints . \par \par 04/08/2021 Patient returns for follow up for anemia on EPO and iron replacement . Hb is 10.8 . she complains of mild left arm pain after she started to self check her BP . ahe meds were recently adjusted by her PMD . \par \par 08/31/2021 patient returns for follow up , she offers no new complaints , her Hgb is slowly trending down after last transfusion and venofer X1 for ferritin : 35 . \par \par 11/18/2021 patient returns for follow up complaining of weakness and dyspnea on minimal exertion , still with lower extremity edema R > L . Hgb is 7.1 2 weeks after transfusion and despite venofer and EPO . She denies melena or hematochezia . Of note she had suspected small bowel bleeding on enteroscopy or capsule endoscopy and was intolerant to push enteroscopy ( hypotension ? ) \par 2/14/22- Patient is here for follow up visit for management of DARRIN. Hgb is 6.7  3 days only after last transfusion , she reports several episodes of bleeding presumably from hemorrhoids and is using topical medications , Ferritin levels remain low despite venofer . She complains of weakness, bilateral leg edema . no chest pain or SOB . \par \par 12/2/21: patient returns for follow up for DARRIN secondary  obscure GI bleeding  and CKD. \par She is feeling better today. We will continue  Venofer infusion weekly X 3 and Procrit.\par Close monitor CBC on weekly basis with possible blood transfusion if required. \par \par 5/19/2022 Patient returns for follow up , she lost her  last week to lung cancer . He Hgb is still 7.2  two weeks after transfusion and despite weekly venofer . Ferritin is trending down . She is noted with weight loss and complains of abdominal cramps and pain radiating from epigastric area and RUQ , radiating to the back .\par Of note CT scan from 1/2022 showed prominent adenopathy involving mesenteric and retroperitoneal lymph nodes. \par \par \par 8/8/2022 Patient returns for chronic iron deficiency anemia , she is requiring transfusion every 2 weeks . today's Hgb is 6.9 however she denies hematochezia , increased weakness , dizziness , chest pain or palpitations . She continues with mild RUQ pain radiating to the back. \par \par 9/6/22: Patient returns for chronic iron deficiency anemia. She is complaining of lightheadedness and SOB since yesterday. CBC shows hemoglobin 5.7 g/dL, platelts 124 with normal ANC. She had a port placed since she has difficult venous access and requires frequent transfusions. Her PET scan was negative for suspicious lesions. She was referred to the ED for transfusion. Denies melena or hematochezia. \par \par 9/26/22: Patient returns for followup of her DARRIN, likely due to occult GI bleed (hx of positive capsule endoscpoy, negative enteroscopy in 2020). She received 2 units in the ED on 9/6, then 1 unit 9/13, and 2 units again on the 9/20. She is feeling well this week after her most recent transfusion. She has had good energy and denies SOB, fatigue, dizziness. She had some hemorrhoidal bleeding prior to her most recent transfusion. \par \par 11/1/22: Patient returns for DARRIN due to GIB. She was admitted 10/1, received 4 units of PRBCs and 2 doses of venofer. She had a push enteroscopy which showed GAVE, APC was applied to bleeding lesions with hemostasis achieved. Additionally, there were bleeding AVMs in the duodenum which also had cautery. 2 additional sites of AVMs seen in the jejunum which were not bleeding. She had thrombocytopenia to 91. Today her hemoglobin is up to 10.0 g/dL and platelets are back to 135 with mild leukopenia. \par received 5 units of PRBCs and venofer x2\par \par 11/28/22: Patient returns for followup. Her hemoglobin and ferritin were trending down ,received 1 unit of pRBCs. She has an appointment with GI 12/12. She thinks she had the flu about a week ago and was recently started on prednisone. Neutropenia resolved on today's CBC. \par \par 2/9/23: Patient returns for follow up for DARRIN due to GI bleeding. She feels overall well, reports feeling slightly better than her usual baseline. Patient recently had a blood transfusion last week and Venofer x3, Hgb had improved to 9.1 and is now trending down. CBC today shows Hgb 7.8. WBC and Plts have also been trending down, patient refused bone marrow biopsy. She states that she recently saw GI and is tentatively scheduled for EGD and colonoscopy on 3/15 (pending cardiac clearance).

## 2023-02-16 ENCOUNTER — OUTPATIENT (OUTPATIENT)
Dept: OUTPATIENT SERVICES | Facility: HOSPITAL | Age: 78
LOS: 1 days | End: 2023-02-16
Payer: MEDICARE

## 2023-02-16 ENCOUNTER — APPOINTMENT (OUTPATIENT)
Dept: INFUSION THERAPY | Facility: CLINIC | Age: 78
End: 2023-02-16

## 2023-02-16 ENCOUNTER — RESULT REVIEW (OUTPATIENT)
Age: 78
End: 2023-02-16

## 2023-02-16 ENCOUNTER — LABORATORY RESULT (OUTPATIENT)
Age: 78
End: 2023-02-16

## 2023-02-16 DIAGNOSIS — Z87.19 PERSONAL HISTORY OF OTHER DISEASES OF THE DIGESTIVE SYSTEM: Chronic | ICD-10-CM

## 2023-02-16 DIAGNOSIS — M79.89 OTHER SPECIFIED SOFT TISSUE DISORDERS: ICD-10-CM

## 2023-02-16 DIAGNOSIS — D64.9 ANEMIA, UNSPECIFIED: ICD-10-CM

## 2023-02-16 LAB
HCT VFR BLD CALC: 24.6 %
HGB BLD-MCNC: 7.5 G/DL
MCHC RBC-ENTMCNC: 28.4 PG
MCHC RBC-ENTMCNC: 30.5 G/DL
MCV RBC AUTO: 93.2 FL
PLATELET # BLD AUTO: 126 K/UL
PMV BLD: 10.8 FL
RBC # BLD: 2.64 M/UL
RBC # FLD: 16.9 %
WBC # FLD AUTO: 3.33 K/UL

## 2023-02-16 PROCEDURE — 93971 EXTREMITY STUDY: CPT | Mod: 26,RT

## 2023-02-16 PROCEDURE — 96365 THER/PROPH/DIAG IV INF INIT: CPT

## 2023-02-16 PROCEDURE — 86901 BLOOD TYPING SEROLOGIC RH(D): CPT

## 2023-02-16 PROCEDURE — 86922 COMPATIBILITY TEST ANTIGLOB: CPT

## 2023-02-16 PROCEDURE — 86900 BLOOD TYPING SEROLOGIC ABO: CPT

## 2023-02-16 PROCEDURE — 85027 COMPLETE CBC AUTOMATED: CPT

## 2023-02-16 PROCEDURE — 96372 THER/PROPH/DIAG INJ SC/IM: CPT

## 2023-02-16 PROCEDURE — 36415 COLL VENOUS BLD VENIPUNCTURE: CPT

## 2023-02-16 PROCEDURE — 86850 RBC ANTIBODY SCREEN: CPT

## 2023-02-16 PROCEDURE — 86902 BLOOD TYPE ANTIGEN DONOR EA: CPT

## 2023-02-16 PROCEDURE — 93971 EXTREMITY STUDY: CPT | Mod: RT

## 2023-02-16 RX ORDER — IRON SUCROSE 20 MG/ML
200 INJECTION, SOLUTION INTRAVENOUS ONCE
Refills: 0 | Status: COMPLETED | OUTPATIENT
Start: 2023-02-16 | End: 2023-02-16

## 2023-02-16 RX ORDER — ERYTHROPOIETIN 10000 [IU]/ML
30000 INJECTION, SOLUTION INTRAVENOUS; SUBCUTANEOUS ONCE
Refills: 0 | Status: COMPLETED | OUTPATIENT
Start: 2023-02-16 | End: 2023-02-16

## 2023-02-16 RX ADMIN — ERYTHROPOIETIN 30000 UNIT(S): 10000 INJECTION, SOLUTION INTRAVENOUS; SUBCUTANEOUS at 13:22

## 2023-02-16 RX ADMIN — IRON SUCROSE 110 MILLIGRAM(S): 20 INJECTION, SOLUTION INTRAVENOUS at 13:23

## 2023-02-16 RX ADMIN — ERYTHROPOIETIN 30000 UNIT(S): 10000 INJECTION, SOLUTION INTRAVENOUS; SUBCUTANEOUS at 13:30

## 2023-02-17 ENCOUNTER — APPOINTMENT (OUTPATIENT)
Dept: INFUSION THERAPY | Facility: CLINIC | Age: 78
End: 2023-02-17

## 2023-02-17 ENCOUNTER — OUTPATIENT (OUTPATIENT)
Dept: OUTPATIENT SERVICES | Facility: HOSPITAL | Age: 78
LOS: 1 days | End: 2023-02-17
Payer: MEDICARE

## 2023-02-17 DIAGNOSIS — D64.9 ANEMIA, UNSPECIFIED: ICD-10-CM

## 2023-02-17 DIAGNOSIS — M79.89 OTHER SPECIFIED SOFT TISSUE DISORDERS: ICD-10-CM

## 2023-02-17 DIAGNOSIS — Z87.19 PERSONAL HISTORY OF OTHER DISEASES OF THE DIGESTIVE SYSTEM: Chronic | ICD-10-CM

## 2023-02-17 DIAGNOSIS — L03.115 CELLULITIS OF RIGHT LOWER LIMB: ICD-10-CM

## 2023-02-17 LAB
ABO + RH PNL BLD: NORMAL
BLD GP AB SCN SERPL QL: NORMAL

## 2023-02-17 PROCEDURE — 36430 TRANSFUSION BLD/BLD COMPNT: CPT

## 2023-02-17 PROCEDURE — P9040: CPT

## 2023-02-17 RX ORDER — SULFAMETHOXAZOLE AND TRIMETHOPRIM 400; 80 MG/1; MG/1
400-80 TABLET ORAL
Qty: 20 | Refills: 0 | Status: ACTIVE | COMMUNITY
Start: 2023-02-17 | End: 1900-01-01

## 2023-02-23 ENCOUNTER — OUTPATIENT (OUTPATIENT)
Dept: OUTPATIENT SERVICES | Facility: HOSPITAL | Age: 78
LOS: 1 days | End: 2023-02-23
Payer: MEDICARE

## 2023-02-23 ENCOUNTER — APPOINTMENT (OUTPATIENT)
Dept: INFUSION THERAPY | Facility: CLINIC | Age: 78
End: 2023-02-23

## 2023-02-23 ENCOUNTER — LABORATORY RESULT (OUTPATIENT)
Age: 78
End: 2023-02-23

## 2023-02-23 DIAGNOSIS — D64.9 ANEMIA, UNSPECIFIED: ICD-10-CM

## 2023-02-23 DIAGNOSIS — Z87.19 PERSONAL HISTORY OF OTHER DISEASES OF THE DIGESTIVE SYSTEM: Chronic | ICD-10-CM

## 2023-02-23 LAB
HCT VFR BLD CALC: 22.2 %
HGB BLD-MCNC: 6.6 G/DL
MCHC RBC-ENTMCNC: 27.8 PG
MCHC RBC-ENTMCNC: 29.7 G/DL
MCV RBC AUTO: 93.7 FL
PLATELET # BLD AUTO: 128 K/UL
PMV BLD: 10.7 FL
RBC # BLD: 2.37 M/UL
RBC # FLD: 16.9 %
WBC # FLD AUTO: 2.9 K/UL

## 2023-02-23 PROCEDURE — 96365 THER/PROPH/DIAG IV INF INIT: CPT

## 2023-02-23 PROCEDURE — 86900 BLOOD TYPING SEROLOGIC ABO: CPT

## 2023-02-23 PROCEDURE — 86901 BLOOD TYPING SEROLOGIC RH(D): CPT

## 2023-02-23 PROCEDURE — 86922 COMPATIBILITY TEST ANTIGLOB: CPT

## 2023-02-23 PROCEDURE — 86902 BLOOD TYPE ANTIGEN DONOR EA: CPT

## 2023-02-23 PROCEDURE — 85027 COMPLETE CBC AUTOMATED: CPT

## 2023-02-23 PROCEDURE — 96372 THER/PROPH/DIAG INJ SC/IM: CPT

## 2023-02-23 PROCEDURE — 86850 RBC ANTIBODY SCREEN: CPT

## 2023-02-23 PROCEDURE — 36415 COLL VENOUS BLD VENIPUNCTURE: CPT

## 2023-02-23 RX ORDER — ERYTHROPOIETIN 10000 [IU]/ML
30000 INJECTION, SOLUTION INTRAVENOUS; SUBCUTANEOUS ONCE
Refills: 0 | Status: COMPLETED | OUTPATIENT
Start: 2023-02-23 | End: 2023-02-23

## 2023-02-23 RX ORDER — IRON SUCROSE 20 MG/ML
200 INJECTION, SOLUTION INTRAVENOUS ONCE
Refills: 0 | Status: COMPLETED | OUTPATIENT
Start: 2023-02-23 | End: 2023-02-23

## 2023-02-23 RX ADMIN — IRON SUCROSE 110 MILLIGRAM(S): 20 INJECTION, SOLUTION INTRAVENOUS at 13:38

## 2023-02-23 RX ADMIN — ERYTHROPOIETIN 30000 UNIT(S): 10000 INJECTION, SOLUTION INTRAVENOUS; SUBCUTANEOUS at 13:39

## 2023-02-24 ENCOUNTER — OUTPATIENT (OUTPATIENT)
Dept: OUTPATIENT SERVICES | Facility: HOSPITAL | Age: 78
LOS: 1 days | End: 2023-02-24
Payer: MEDICARE

## 2023-02-24 ENCOUNTER — APPOINTMENT (OUTPATIENT)
Dept: INFUSION THERAPY | Facility: CLINIC | Age: 78
End: 2023-02-24

## 2023-02-24 DIAGNOSIS — Z87.19 PERSONAL HISTORY OF OTHER DISEASES OF THE DIGESTIVE SYSTEM: Chronic | ICD-10-CM

## 2023-02-24 DIAGNOSIS — D64.9 ANEMIA, UNSPECIFIED: ICD-10-CM

## 2023-02-24 LAB
ABO + RH PNL BLD: NORMAL
BLD GP AB SCN SERPL QL: NORMAL

## 2023-02-24 PROCEDURE — P9040: CPT

## 2023-02-24 PROCEDURE — 36430 TRANSFUSION BLD/BLD COMPNT: CPT

## 2023-03-01 LAB — VWF MULTIMERS PPP IA-ACNC: NORMAL

## 2023-03-02 ENCOUNTER — LABORATORY RESULT (OUTPATIENT)
Age: 78
End: 2023-03-02

## 2023-03-02 ENCOUNTER — INPATIENT (INPATIENT)
Facility: HOSPITAL | Age: 78
LOS: 0 days | Discharge: HOME | DRG: 811 | End: 2023-03-03
Attending: HOSPITALIST | Admitting: HOSPITALIST
Payer: MEDICARE

## 2023-03-02 ENCOUNTER — OUTPATIENT (OUTPATIENT)
Dept: OUTPATIENT SERVICES | Facility: HOSPITAL | Age: 78
LOS: 1 days | End: 2023-03-02
Payer: MEDICARE

## 2023-03-02 ENCOUNTER — APPOINTMENT (OUTPATIENT)
Dept: INFUSION THERAPY | Facility: CLINIC | Age: 78
End: 2023-03-02

## 2023-03-02 VITALS
DIASTOLIC BLOOD PRESSURE: 50 MMHG | HEART RATE: 82 BPM | TEMPERATURE: 98 F | RESPIRATION RATE: 18 BRPM | OXYGEN SATURATION: 99 % | WEIGHT: 233.91 LBS | HEIGHT: 67 IN | SYSTOLIC BLOOD PRESSURE: 112 MMHG

## 2023-03-02 DIAGNOSIS — D64.9 ANEMIA, UNSPECIFIED: ICD-10-CM

## 2023-03-02 DIAGNOSIS — Z87.19 PERSONAL HISTORY OF OTHER DISEASES OF THE DIGESTIVE SYSTEM: Chronic | ICD-10-CM

## 2023-03-02 LAB
ALBUMIN SERPL ELPH-MCNC: 3.2 G/DL — LOW (ref 3.5–5.2)
ALP SERPL-CCNC: 70 U/L — SIGNIFICANT CHANGE UP (ref 30–115)
ALT FLD-CCNC: <5 U/L — SIGNIFICANT CHANGE UP (ref 0–41)
ANION GAP SERPL CALC-SCNC: 11 MMOL/L — SIGNIFICANT CHANGE UP (ref 7–14)
ANISOCYTOSIS BLD QL: SLIGHT — SIGNIFICANT CHANGE UP
AST SERPL-CCNC: 11 U/L — SIGNIFICANT CHANGE UP (ref 0–41)
BASOPHILS # BLD AUTO: 0.02 K/UL — SIGNIFICANT CHANGE UP (ref 0–0.2)
BASOPHILS NFR BLD AUTO: 0.9 % — SIGNIFICANT CHANGE UP (ref 0–1)
BILIRUB SERPL-MCNC: 0.4 MG/DL — SIGNIFICANT CHANGE UP (ref 0.2–1.2)
BLD GP AB SCN SERPL QL: SIGNIFICANT CHANGE UP
BUN SERPL-MCNC: 70 MG/DL — CRITICAL HIGH (ref 10–20)
CALCIUM SERPL-MCNC: 8 MG/DL — LOW (ref 8.4–10.5)
CHLORIDE SERPL-SCNC: 112 MMOL/L — HIGH (ref 98–110)
CO2 SERPL-SCNC: 20 MMOL/L — SIGNIFICANT CHANGE UP (ref 17–32)
CREAT SERPL-MCNC: 2.7 MG/DL — HIGH (ref 0.7–1.5)
DACRYOCYTES BLD QL SMEAR: SLIGHT — SIGNIFICANT CHANGE UP
EGFR: 18 ML/MIN/1.73M2 — LOW
EOSINOPHIL # BLD AUTO: 0.13 K/UL — SIGNIFICANT CHANGE UP (ref 0–0.7)
EOSINOPHIL NFR BLD AUTO: 5.3 % — SIGNIFICANT CHANGE UP (ref 0–8)
GIANT PLATELETS BLD QL SMEAR: PRESENT — SIGNIFICANT CHANGE UP
GLUCOSE SERPL-MCNC: 108 MG/DL — HIGH (ref 70–99)
HCT VFR BLD CALC: 17.4 % — LOW (ref 37–47)
HCT VFR BLD CALC: 18.5 %
HGB BLD-MCNC: 5.1 G/DL — CRITICAL LOW (ref 12–16)
HGB BLD-MCNC: 5.4 G/DL
LYMPHOCYTES # BLD AUTO: 0.57 K/UL — LOW (ref 1.2–3.4)
LYMPHOCYTES # BLD AUTO: 22.8 % — SIGNIFICANT CHANGE UP (ref 20.5–51.1)
MACROCYTES BLD QL: SLIGHT — SIGNIFICANT CHANGE UP
MANUAL SMEAR VERIFICATION: SIGNIFICANT CHANGE UP
MCHC RBC-ENTMCNC: 27.8 PG
MCHC RBC-ENTMCNC: 28.5 PG — SIGNIFICANT CHANGE UP (ref 27–31)
MCHC RBC-ENTMCNC: 29.2 G/DL
MCHC RBC-ENTMCNC: 29.3 G/DL — LOW (ref 32–37)
MCV RBC AUTO: 95.4 FL
MCV RBC AUTO: 97.2 FL — SIGNIFICANT CHANGE UP (ref 81–99)
MICROCYTES BLD QL: SLIGHT — SIGNIFICANT CHANGE UP
MONOCYTES # BLD AUTO: 0.06 K/UL — LOW (ref 0.1–0.6)
MONOCYTES NFR BLD AUTO: 2.6 % — SIGNIFICANT CHANGE UP (ref 1.7–9.3)
NEUTROPHILS # BLD AUTO: 1.7 K/UL — SIGNIFICANT CHANGE UP (ref 1.4–6.5)
NEUTROPHILS NFR BLD AUTO: 67.5 % — SIGNIFICANT CHANGE UP (ref 42.2–75.2)
NEUTS BAND # BLD: 0.9 % — SIGNIFICANT CHANGE UP (ref 0–6)
NRBC # BLD: 1 /100 — HIGH (ref 0–0)
NRBC # BLD: SIGNIFICANT CHANGE UP /100 WBCS (ref 0–0)
OVALOCYTES BLD QL SMEAR: SLIGHT — SIGNIFICANT CHANGE UP
PLAT MORPH BLD: NORMAL — SIGNIFICANT CHANGE UP
PLATELET # BLD AUTO: 124 K/UL
PLATELET # BLD AUTO: 125 K/UL — LOW (ref 130–400)
PMV BLD: 10.6 FL
POIKILOCYTOSIS BLD QL AUTO: SLIGHT — SIGNIFICANT CHANGE UP
POLYCHROMASIA BLD QL SMEAR: SLIGHT — SIGNIFICANT CHANGE UP
POTASSIUM SERPL-MCNC: 4.4 MMOL/L — SIGNIFICANT CHANGE UP (ref 3.5–5)
POTASSIUM SERPL-SCNC: 4.4 MMOL/L — SIGNIFICANT CHANGE UP (ref 3.5–5)
PROT SERPL-MCNC: 5.4 G/DL — LOW (ref 6–8)
RBC # BLD: 1.79 M/UL — LOW (ref 4.2–5.4)
RBC # BLD: 1.94 M/UL
RBC # FLD: 17.4 % — HIGH (ref 11.5–14.5)
RBC # FLD: 18 %
RBC BLD AUTO: ABNORMAL
RETICS # AUTO: 5.2 %
RETICS AGGREG/RBC NFR: 100.9 K/UL
SODIUM SERPL-SCNC: 143 MMOL/L — SIGNIFICANT CHANGE UP (ref 135–146)
TARGETS BLD QL SMEAR: SLIGHT — SIGNIFICANT CHANGE UP
TROPONIN T SERPL-MCNC: <0.01 NG/ML — SIGNIFICANT CHANGE UP
WBC # BLD: 2.48 K/UL — LOW (ref 4.8–10.8)
WBC # FLD AUTO: 2.48 K/UL — LOW (ref 4.8–10.8)
WBC # FLD AUTO: 2.9 K/UL

## 2023-03-02 PROCEDURE — 86902 BLOOD TYPE ANTIGEN DONOR EA: CPT

## 2023-03-02 PROCEDURE — P9040: CPT

## 2023-03-02 PROCEDURE — 83735 ASSAY OF MAGNESIUM: CPT

## 2023-03-02 PROCEDURE — 85027 COMPLETE CBC AUTOMATED: CPT

## 2023-03-02 PROCEDURE — 71045 X-RAY EXAM CHEST 1 VIEW: CPT | Mod: 26

## 2023-03-02 PROCEDURE — 96365 THER/PROPH/DIAG IV INF INIT: CPT

## 2023-03-02 PROCEDURE — 80053 COMPREHEN METABOLIC PANEL: CPT

## 2023-03-02 PROCEDURE — 93010 ELECTROCARDIOGRAM REPORT: CPT

## 2023-03-02 PROCEDURE — 36415 COLL VENOUS BLD VENIPUNCTURE: CPT

## 2023-03-02 PROCEDURE — 36430 TRANSFUSION BLD/BLD COMPNT: CPT

## 2023-03-02 PROCEDURE — 85025 COMPLETE CBC W/AUTO DIFF WBC: CPT

## 2023-03-02 PROCEDURE — 96372 THER/PROPH/DIAG INJ SC/IM: CPT

## 2023-03-02 PROCEDURE — 85046 RETICYTE/HGB CONCENTRATE: CPT

## 2023-03-02 PROCEDURE — 86922 COMPATIBILITY TEST ANTIGLOB: CPT

## 2023-03-02 PROCEDURE — 99223 1ST HOSP IP/OBS HIGH 75: CPT

## 2023-03-02 PROCEDURE — 99285 EMERGENCY DEPT VISIT HI MDM: CPT

## 2023-03-02 RX ORDER — IRON SUCROSE 20 MG/ML
200 INJECTION, SOLUTION INTRAVENOUS ONCE
Refills: 0 | Status: COMPLETED | OUTPATIENT
Start: 2023-03-02 | End: 2023-03-02

## 2023-03-02 RX ORDER — PANTOPRAZOLE SODIUM 20 MG/1
80 TABLET, DELAYED RELEASE ORAL ONCE
Refills: 0 | Status: COMPLETED | OUTPATIENT
Start: 2023-03-02 | End: 2023-03-02

## 2023-03-02 RX ORDER — DEXAMETHASONE 0.5 MG/5ML
10 ELIXIR ORAL ONCE
Refills: 0 | Status: COMPLETED | OUTPATIENT
Start: 2023-03-02 | End: 2023-03-02

## 2023-03-02 RX ORDER — DIPHENHYDRAMINE HCL 50 MG
50 CAPSULE ORAL ONCE
Refills: 0 | Status: COMPLETED | OUTPATIENT
Start: 2023-03-02 | End: 2023-03-02

## 2023-03-02 RX ORDER — ACETAMINOPHEN 500 MG
650 TABLET ORAL EVERY 6 HOURS
Refills: 0 | Status: DISCONTINUED | OUTPATIENT
Start: 2023-03-02 | End: 2023-03-03

## 2023-03-02 RX ORDER — PANTOPRAZOLE SODIUM 20 MG/1
8 TABLET, DELAYED RELEASE ORAL
Qty: 80 | Refills: 0 | Status: DISCONTINUED | OUTPATIENT
Start: 2023-03-02 | End: 2023-03-03

## 2023-03-02 RX ORDER — LANOLIN ALCOHOL/MO/W.PET/CERES
5 CREAM (GRAM) TOPICAL AT BEDTIME
Refills: 0 | Status: DISCONTINUED | OUTPATIENT
Start: 2023-03-02 | End: 2023-03-03

## 2023-03-02 RX ORDER — ERYTHROPOIETIN 10000 [IU]/ML
30000 INJECTION, SOLUTION INTRAVENOUS; SUBCUTANEOUS ONCE
Refills: 0 | Status: COMPLETED | OUTPATIENT
Start: 2023-03-02 | End: 2023-03-02

## 2023-03-02 RX ADMIN — PANTOPRAZOLE SODIUM 80 MILLIGRAM(S): 20 TABLET, DELAYED RELEASE ORAL at 18:20

## 2023-03-02 RX ADMIN — ERYTHROPOIETIN 30000 UNIT(S): 10000 INJECTION, SOLUTION INTRAVENOUS; SUBCUTANEOUS at 13:26

## 2023-03-02 RX ADMIN — IRON SUCROSE 110 MILLIGRAM(S): 20 INJECTION, SOLUTION INTRAVENOUS at 13:26

## 2023-03-02 NOTE — ED PROVIDER NOTE - OBJECTIVE STATEMENT
77 year old female, past medical history htn, hdl, chf, GIB s/p AVM, iron deficiency anemia, who presents with low hemoglobin. patient with outpatient lab works performed weekly, last blood work today found to have hemoglobin 5.1. reports intermittent lightheadedness and melena. no ac use. receives iron infusions. denies fever, chills, chest pain, hemoptysis, shortness of breath, back pain, urinary symptoms, syncope. last endoscopy 10/2022.

## 2023-03-02 NOTE — H&P ADULT - ATTENDING COMMENTS
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 Associated with -ABD pain. - hematemesis, - black/bloody stools. - recent use of NSAIDs. Denies any hematuria, dysuria, rashes, bruising, N/V/D, or fevers/chills. ROS negative, except as stated above.     In the ED, OLEKSANDR was negative. Hgb was 5.1. T&S obtained and pt transfused 2 units of PRBCs. Started on PPI.     FMHx:   -No family Hx of early cardiac death, CAD, asthma, or genetic disorders identified    Physical exam shows pt in NAD. VSS, afebrile, not hypoxic on RA. Pallor present. A&Ox3. Neuro exam intact. CTA B/L with no W/C/R. RRR, no M/G/R. ABD with no TTP, normoactive BSs. LEs without swelling. No rashes or ecchymosis noted. Labs and radiology as above.     Normocytic anemia, rule out GIB. HD stable. Trend CBC. Anemia studies. PPI. Active T&S. Transfuse PRN for Hgb < 7. Two large bore IVs. NPO. GI consult. Heme/Onc ([facility]) consult     Hypoalbuminemia, from poor oral intake. Nutrition eval.     Hx of HTN, HLD, CKD4, and Severe AS. Restart home meds, except as stated above. DVT PPX. Inform PCP of pt's admission to hospital. My note supersedes the residents note.     Date seen by Attending: 3/2/23

## 2023-03-02 NOTE — ED PROVIDER NOTE - PHYSICAL EXAMINATION
CONSTITUTIONAL: non-toxic appearing female, NAD   SKIN: skin exam is warm and dry  HEAD: Normocephalic; atraumatic  EYES: PERRL, conjunctiva and sclera clear.  ENT: MMM  CARD: S1, S2 normal, no murmur  RESP: No wheezes, rales or rhonchi. Good air movement bilaterally  ABD: soft; non-distended; +epigastric TTP, no rebound/guarding. no CVAT. OLEKSANDR: Chaperone: brown stool in vault, no brbpr, no melena. PA Zafar  EXT: Normal ROM   NEURO: awake, alert, following commands, oriented, grossly unremarkable. No Focal deficits. GCS 15.   PSYCH: Cooperative, appropriate.

## 2023-03-02 NOTE — H&P ADULT - NSHPREVIEWOFSYSTEMS_GEN_ALL_CORE
CONSTITUTIONAL: (+) weakness, No fevers or chills  EYES/ENT: No visual changes;  No vertigo or throat pain   NECK: No pain or stiffness  RESPIRATORY: No cough, wheezing, hemoptysis; (+) Exertional shortness of breath  CARDIOVASCULAR: No chest pain or palpitations  GASTROINTESTINAL: No abdominal or epigastric pain. No nausea, vomiting, or hematemesis; No diarrhea or constipation. No hematochezia. (+) Intermittent melena.  GENITOURINARY: No dysuria, frequency or hematuria  NEUROLOGICAL: No numbness or weakness. (+) Exertional light-headedness  SKIN: No itching, rashes

## 2023-03-02 NOTE — ED PROVIDER NOTE - CLINICAL SUMMARY MEDICAL DECISION MAKING FREE TEXT BOX
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 Associated with -ABD pain. - hematemesis, - black/bloody stools. - recent use of NSAIDs. Denies any hematuria, dysuria, rashes, bruising, N/V/D, or fevers/chills. ROS negative, except as stated above.  OLEKSANDR was negative. Hgb was 5.1. T&S obtained and pt transfused 2 units of PRBCs. Started on PPI. ADMIT

## 2023-03-02 NOTE — H&P ADULT - NSHPPHYSICALEXAM_GEN_ALL_CORE
General: NAD, AOx3, hard of hearing  HEENT: Normocephalic, atraumatic  Lungs: Decreased b/l breath sounds, no wheezes/crackles  Heart: Loud holosystolic murmur, S1s2  Abdomen: Soft, NT/ND. No rigidity/guarding  Extremities: Peripheral pulses +1, no cyanosis. Trace b/l LE edema R>L  Neuro: Generalized weakness, no focal deficits  Psych: AOx3, normal affect  Skin: No rashes or bruises

## 2023-03-02 NOTE — H&P ADULT - HISTORY OF PRESENT ILLNESS
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 Pt went to get her IV venofer transfusion today when outpatient labs showed a Hb of 5.1. Pt reports a chronic history of intermittent exertional dyspnea, exertional light-headedness, and intermittent melena. She was recently admitted in 1/2023 for anemia and was scheduled for an outpatient repeat EGD/Colon/VCE later this month. She denies any BRBPR, hematochezia, Wt loss, or NSAID use. She denies any CP, palpitations, abd pain, N/V/C/D, or any  symptoms. Of note, pt was treated for RLE cellulitis 2 weeks ago with 10 days of bactrim, got a RLE duplex which was -ve for DVT.    In the ED: /50, HR 82, T 97F, RR 18 satting 99% on RA. Labs notable for Hb 5.1 (baseline ~8). Cr 2.7 around baseline, Trop 0.01. CXR prominent vascular markings, no effusions (f/u official). Pt due to receive 2u PRBC in ED, started on protonix drip, admitted to medicine.

## 2023-03-02 NOTE — H&P ADULT - ASSESSMENT
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  - Was scheduled to get repeat EGD/Colonoscopy on 3/15/2022  - Transfuse 2u PRBC, monitor for volume overload  - Keep active T&S, transfuse to keep Hb > 7  - Started protonix drip   - GI consult  - CLD for now, NPO after midnight  - Hold metoprolol     #CKD4  - Cr around baseline, pt euvolemic   - Trend BMP, avoid volume overload    #Severe AS  #HTN  - Last TTE 2/2023: Severe AS. EF 75%, mild pulm HTN  - pt with mild b/l LE edema, takes lasix intermittently but not home med  - As per pt, will have surgery for AS after management of her GI bleeding (but understands the recurrent nature of GIB 2/2 AS)  - Hold metoprolol    #Pancytopenia  - Follows with Dr Gaston, received frequent IV venofer and PRBC transfusions  - Refused BM biopsy in the past  - Outpatient heme/onc f/u    DVT ppx: SCDs  GI ppx: Protonix drip  Diet: CLD then NPO  Activity: IAT  Dispo: Acute

## 2023-03-03 ENCOUNTER — TRANSCRIPTION ENCOUNTER (OUTPATIENT)
Age: 78
End: 2023-03-03

## 2023-03-03 VITALS
DIASTOLIC BLOOD PRESSURE: 62 MMHG | RESPIRATION RATE: 18 BRPM | TEMPERATURE: 97 F | HEART RATE: 91 BPM | SYSTOLIC BLOOD PRESSURE: 134 MMHG | OXYGEN SATURATION: 100 %

## 2023-03-03 DIAGNOSIS — D64.9 ANEMIA, UNSPECIFIED: ICD-10-CM

## 2023-03-03 LAB
ALBUMIN SERPL ELPH-MCNC: 3.4 G/DL — LOW (ref 3.5–5.2)
ALP SERPL-CCNC: 71 U/L — SIGNIFICANT CHANGE UP (ref 30–115)
ALT FLD-CCNC: <5 U/L — SIGNIFICANT CHANGE UP (ref 0–41)
ANION GAP SERPL CALC-SCNC: 8 MMOL/L — SIGNIFICANT CHANGE UP (ref 7–14)
AST SERPL-CCNC: 12 U/L — SIGNIFICANT CHANGE UP (ref 0–41)
BASOPHILS # BLD AUTO: 0.01 K/UL — SIGNIFICANT CHANGE UP (ref 0–0.2)
BASOPHILS NFR BLD AUTO: 0.5 % — SIGNIFICANT CHANGE UP (ref 0–1)
BILIRUB SERPL-MCNC: 1 MG/DL — SIGNIFICANT CHANGE UP (ref 0.2–1.2)
BUN SERPL-MCNC: 66 MG/DL — CRITICAL HIGH (ref 10–20)
CALCIUM SERPL-MCNC: 8.5 MG/DL — SIGNIFICANT CHANGE UP (ref 8.4–10.5)
CHLORIDE SERPL-SCNC: 112 MMOL/L — HIGH (ref 98–110)
CO2 SERPL-SCNC: 21 MMOL/L — SIGNIFICANT CHANGE UP (ref 17–32)
CREAT SERPL-MCNC: 2.7 MG/DL — HIGH (ref 0.7–1.5)
EGFR: 18 ML/MIN/1.73M2 — LOW
EOSINOPHIL # BLD AUTO: 0.01 K/UL — SIGNIFICANT CHANGE UP (ref 0–0.7)
EOSINOPHIL NFR BLD AUTO: 0.5 % — SIGNIFICANT CHANGE UP (ref 0–8)
GLUCOSE SERPL-MCNC: 120 MG/DL — HIGH (ref 70–99)
HCT VFR BLD CALC: 23.3 % — LOW (ref 37–47)
HGB BLD-MCNC: 7.3 G/DL — LOW (ref 12–16)
IMM GRANULOCYTES NFR BLD AUTO: 1.1 % — HIGH (ref 0.1–0.3)
LYMPHOCYTES # BLD AUTO: 0.21 K/UL — LOW (ref 1.2–3.4)
LYMPHOCYTES # BLD AUTO: 11.3 % — LOW (ref 20.5–51.1)
MAGNESIUM SERPL-MCNC: 2.5 MG/DL — HIGH (ref 1.8–2.4)
MCHC RBC-ENTMCNC: 29.9 PG — SIGNIFICANT CHANGE UP (ref 27–31)
MCHC RBC-ENTMCNC: 31.3 G/DL — LOW (ref 32–37)
MCV RBC AUTO: 95.5 FL — SIGNIFICANT CHANGE UP (ref 81–99)
MONOCYTES # BLD AUTO: 0.03 K/UL — LOW (ref 0.1–0.6)
MONOCYTES NFR BLD AUTO: 1.6 % — LOW (ref 1.7–9.3)
NEUTROPHILS # BLD AUTO: 1.58 K/UL — SIGNIFICANT CHANGE UP (ref 1.4–6.5)
NEUTROPHILS NFR BLD AUTO: 85 % — HIGH (ref 42.2–75.2)
NRBC # BLD: 0 /100 WBCS — SIGNIFICANT CHANGE UP (ref 0–0)
PLATELET # BLD AUTO: 118 K/UL — LOW (ref 130–400)
POTASSIUM SERPL-MCNC: 5.2 MMOL/L — HIGH (ref 3.5–5)
POTASSIUM SERPL-SCNC: 5.2 MMOL/L — HIGH (ref 3.5–5)
PROT SERPL-MCNC: 5.8 G/DL — LOW (ref 6–8)
RBC # BLD: 2.44 M/UL — LOW (ref 4.2–5.4)
RBC # FLD: 16 % — HIGH (ref 11.5–14.5)
SODIUM SERPL-SCNC: 141 MMOL/L — SIGNIFICANT CHANGE UP (ref 135–146)
WBC # BLD: 1.86 K/UL — LOW (ref 4.8–10.8)
WBC # FLD AUTO: 1.86 K/UL — LOW (ref 4.8–10.8)

## 2023-03-03 PROCEDURE — 99239 HOSP IP/OBS DSCHRG MGMT >30: CPT

## 2023-03-03 PROCEDURE — 99223 1ST HOSP IP/OBS HIGH 75: CPT

## 2023-03-03 RX ADMIN — PANTOPRAZOLE SODIUM 10 MG/HR: 20 TABLET, DELAYED RELEASE ORAL at 00:56

## 2023-03-03 RX ADMIN — Medication 50 MILLIGRAM(S): at 01:05

## 2023-03-03 RX ADMIN — Medication 102 MILLIGRAM(S): at 01:05

## 2023-03-03 NOTE — DISCHARGE NOTE PROVIDER - HOSPITAL COURSE
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 Pt went to get her IV venofer transfusion today when outpatient labs showed a Hb of 5.1. Pt reports a chronic history of intermittent exertional dyspnea, exertional light-headedness, and intermittent melena. She was recently admitted in 1/2023 for anemia and was scheduled for an outpatient repeat EGD/Colon/VCE later this month. She denies any BRBPR, hematochezia, Wt loss, or NSAID use. She denies any CP, palpitations, abd pain, N/V/C/D, or any  symptoms. Of note, pt was treated for RLE cellulitis 2 weeks ago with 10 days of bactrim, got a RLE duplex which was -ve for DVT.    In the ED: /50, HR 82, T 97F, RR 18 satting 99% on RA. Labs notable for Hb 5.1 (baseline ~8). Cr 2.7 around baseline, Trop 0.01. CXR prominent vascular markings, no effusions (f/u official). Pt due to receive 2u PRBC in ED, started on protonix drip, admitted to medicine.      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  - Was scheduled to get repeat EGD/Colonoscopy on 3/15/2022  - Transfuse 2u PRBC, monitor for volume overload  - Keep active T&S, transfuse to keep Hb > 7  - Started protonix drip   - GI consult  - CLD for now, NPO after midnight  - Hold metoprolol     #CKD4  - Cr around baseline, pt euvolemic   - Trend BMP, avoid volume overload    #Severe AS  #HTN  - Last TTE 2/2023: Severe AS. EF 75%, mild pulm HTN  - pt with mild b/l LE edema, takes lasix intermittently but not home med  - As per pt, will have surgery for AS after management of her GI bleeding (but understands the recurrent nature of GIB 2/2 AS)  - Hold metoprolol    #Pancytopenia  - Follows with Dr Gaston, received frequent IV venofer and PRBC transfusions  - Refused BM biopsy in the past  - Outpatient heme/onc f/u    DVT ppx: SCDs  GI ppx: Protonix drip  Diet: CLD then NPO  Activity: IAT  Dispo: Acute    Patient wants to leave as if she doesn't get scoped today. 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 Pt went to get her IV venofer transfusion today when outpatient labs showed a Hb of 5.1. Pt reports a chronic history of intermittent exertional dyspnea, exertional light-headedness, and intermittent melena. She was recently admitted in 1/2023 for anemia and was scheduled for an outpatient repeat EGD/Colon/VCE later this month. She denies any BRBPR, hematochezia, Wt loss, or NSAID use. She denies any CP, palpitations, abd pain, N/V/C/D, or any  symptoms. Of note, pt was treated for RLE cellulitis 2 weeks ago with 10 days of bactrim, got a RLE duplex which was -ve for DVT.    In the ED: /50, HR 82, T 97F, RR 18 satting 99% on RA. Labs notable for Hb 5.1 (baseline ~8). Cr 2.7 around baseline, Trop 0.01. CXR prominent vascular markings, no effusions (f/u official). Pt due to receive 2u PRBC in ED, started on protonix drip, admitted to medicine.      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  - Was scheduled to get repeat EGD/Colonoscopy on 3/15/2022  - Transfuse 2u PRBC, monitor for volume overload  - Keep active T&S, transfuse to keep Hb > 7  - Started protonix drip   - GI consult  - CLD for now, NPO after midnight  - Hold metoprolol     #CKD4  - Cr around baseline, pt euvolemic   - Trend BMP, avoid volume overload    #Severe AS  #HTN  - Last TTE 2/2023: Severe AS. EF 75%, mild pulm HTN  - pt with mild b/l LE edema, takes lasix intermittently but not home med  - As per pt, will have surgery for AS after management of her GI bleeding (but understands the recurrent nature of GIB 2/2 AS)  - Hold metoprolol    #Pancytopenia  - Follows with Dr Gaston, received frequent IV venofer and PRBC transfusions  - Refused BM biopsy in the past  - Outpatient heme/onc f/u    DVT ppx: SCDs  GI ppx: Protonix drip  Diet: CLD then NPO  Activity: IAT  Dispo: Acute    Patient wants to leave as if she doesn't get scoped today. Repeat hgb was 7.2. 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 Pt went to get her IV venofer transfusion today when outpatient labs showed a Hb of 5.1. Pt reports a chronic history of intermittent exertional dyspnea, exertional light-headedness, and intermittent melena. She was recently admitted in 1/2023 for anemia and was scheduled for an outpatient repeat EGD/Colon/VCE later this month. She denies any BRBPR, hematochezia, Wt loss, or NSAID use. She denies any CP, palpitations, abd pain, N/V/C/D, or any  symptoms. Of note, pt was treated for RLE cellulitis 2 weeks ago with 10 days of bactrim, got a RLE duplex which was -ve for DVT.    In the ED: /50, HR 82, T 97F, RR 18 satting 99% on RA. Labs notable for Hb 5.1 (baseline ~8). Cr 2.7 around baseline, Trop 0.01. CXR prominent vascular markings, no effusions (f/u official). Pt due to receive 2u PRBC in ED, started on protonix drip, admitted to medicine.      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  - Was scheduled to get repeat EGD/Colonoscopy on 3/15/2022  - Transfuse 2u PRBC, monitor for volume overload  - Keep active T&S, transfuse to keep Hb > 7  - Started protonix drip   - GI consult        #CKD4  - Cr around baseline, pt euvolemic   - Trend BMP, avoid volume overload    #Severe AS  #HTN  - Last TTE 2/2023: Severe AS. EF 75%, mild pulm HTN  - pt with mild b/l LE edema, takes lasix intermittently but not home med  - As per pt, will have surgery for AS after management of her GI bleeding (but understands the recurrent nature of GIB 2/2 AS)  - Hold metoprolol    #Pancytopenia  - Follows with Dr Gaston, received frequent IV venofer and PRBC transfusions  - Refused BM biopsy in the past  - Outpatient heme/onc f/u    DVT ppx: SCDs  GI ppx: Protonix drip  Diet: CLD then NPO  Activity: IAT  Dispo: Acute    Patient wants to leave as if she doesn't get scoped today. Repeat hgb was 7.2. Patient has appointment scheduled OP with GI.       Attending addendum: Patient presented with severe anemia. Hx of chronic anemia due to DARRIN from chronic blood loss. Patient evaluated by GI, cleared for outpatient follow up as patient does not want to wait til Monday for EGD.

## 2023-03-03 NOTE — DISCHARGE NOTE PROVIDER - DISCHARGE DATE
03-Mar-2023 Niacinamide Counseling: I recommended taking niacin or niacinamide, also know as vitamin B3, twice daily. Recent evidence suggests that taking vitamin B3 (500 mg twice daily) can reduce the risk of actinic keratoses and non-melanoma skin cancers. Side effects of vitamin B3 include flushing and headache.

## 2023-03-03 NOTE — DISCHARGE NOTE PROVIDER - NSDCFUSCHEDAPPT_GEN_ALL_CORE_FT
Munir Paula  [facility]  CARDIOLOGY [address] [facility]  Scheduled Appointment: 03/07/2023    Maxx Rodriguez  [facility]  OTOLARYNG [id number] [facility]  Scheduled Appointment: 03/22/2023

## 2023-03-03 NOTE — PROGRESS NOTE ADULT - ASSESSMENT
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  - Was scheduled to get repeat EGD/Colonoscopy on 3/15/2022  - Transfuse 2u PRBC, monitor for volume overload  - Keep active T&S, transfuse to keep Hb > 7  - Started protonix drip   - GI consult  - NPO until GI recs are in  - Hold metoprolol     #CKD4  - Cr around baseline, pt euvolemic   - Trend BMP, avoid volume overload    #Severe AS  #HTN  - Last TTE 2/2023: Severe AS. EF 75%, mild pulm HTN  - pt with mild b/l LE edema, takes lasix intermittently but not home med  - As per pt, will have surgery for AS after management of her GI bleeding (but understands the recurrent nature of GIB 2/2 AS)  - Hold metoprolol    #Pancytopenia  - Follows with Dr Gaston, received frequent IV venofer and PRBC transfusions  - Refused BM biopsy in the past  - Outpatient heme/onc f/u    DVT ppx: SCDs  GI ppx: Protonix drip  Diet: CLD then NPO  Activity: IAT  Dispo: Acute

## 2023-03-03 NOTE — DISCHARGE NOTE NURSING/CASE MANAGEMENT/SOCIAL WORK - PATIENT PORTAL LINK FT
You can access the FollowMyHealth Patient Portal offered by [facility] by registering at the following website: [email or web address]. By joining [facility]’s FollowMyHealth portal, you will also be able to view your health information using other applications (apps) compatible with our system.

## 2023-03-03 NOTE — DISCHARGE NOTE PROVIDER - NSDCCPCAREPLAN_GEN_ALL_CORE_FT
PRINCIPAL DISCHARGE DIAGNOSIS  Diagnosis: Anemia  Assessment and Plan of Treatment:       SECONDARY DISCHARGE DIAGNOSES  Diagnosis: Melena  Assessment and Plan of Treatment:      PRINCIPAL DISCHARGE DIAGNOSIS  Diagnosis: Anemia  Assessment and Plan of Treatment: You received blood transfusions. You will need to follow up with Dr. Sean Powell for endoscopy. Follow up with Dr. Gaston.

## 2023-03-03 NOTE — CONSULT NOTE ADULT - SUBJECTIVE AND OBJECTIVE BOX
Gastroenterology Consultation:    Patient is a 77y old  Female who presents with a chief complaint of Acute on Chronic Anemia 2/2 Suspected GI bleed (02 Mar 2023 18:52)      Admitted on: 03-02-23  HPI:  77F with PMHx of HTN, HLD, CKD4, Severe AS, Iron deficiency Anemia 2/2 AVM, w/ frequent iron and PRBC transfusions presents to ED for low hemoglobin. Pt went to get her IV venofer transfusion today when outpatient labs showed a Hb of 5.1. Pt reports a chronic history of intermittent exertional dyspnea, exertional light-headedness, and intermittent melena. She was recently admitted in 1/2023 for anemia and was scheduled for an outpatient repeat EGD/Colon/VCE later this month. She denies any BRBPR, hematochezia, Wt loss, or NSAID use.   She denies any CP, palpitations, abd pain, N/V/C/D, or any  symptoms. Of note, pt was treated for RLE cellulitis 2 weeks ago with 10 days of bactrim, got a RLE duplex which was -ve for DVT.    In the ED: /50, HR 82, T 97F, RR 18 satting 99% on RA. Labs notable for Hb 5.1 (baseline ~8). Cr 2.7 around baseline, Trop 0.01. CXR prominent vascular markings, no effusions (f/u official). Pt due to receive 2u PRBC in ED, started on protonix drip, admitted to medicine.          Prior EGD:  Prior Colonoscopy:      PAST MEDICAL & SURGICAL HISTORY:  HTN (hypertension)  Heart murmur  Eczema  Anemia iron infusions and PRBC transfusions  Aortic stenosis  Chronic kidney disease (CKD)  2019 novel coronavirus disease (COVID-19)  4/2020- REHAB admission  H/O appendicitis  H/O cholecystitis  s/p cholecystectomy    FAMILY HISTORY:  FH: kidney disease (Father)    Social History:  Tobacco:  Alcohol:  Drugs:    Home Medications:  Benadryl 25 mg oral capsule: 1 cap(s) orally once a day (at bedtime), As Needed (02 Mar 2023 18:51)  metoprolol tartrate 25 mg oral tablet: 1 tab(s) orally 2 times a day (02 Mar 2023 18:51)    MEDICATIONS  (STANDING):  pantoprazole Infusion 8 mG/Hr (10 mL/Hr) IV Continuous <Continuous>    MEDICATIONS  (PRN):  acetaminophen     Tablet .. 650 milliGRAM(s) Oral every 6 hours PRN Temp greater or equal to 38C (100.4F), Mild Pain (1 - 3)  melatonin 5 milliGRAM(s) Oral at bedtime PRN Insomnia    Allergies  aspirin (Rash)  latex (Unknown)  penicillins (Unknown)    Review of Systems:   Constitutional:  No Fever, No Chills  ENT/Mouth:  No Hearing Changes,  No Difficulty Swallowing  Eyes:  No Eye Pain, No Vision Changes  Cardiovascular:  No Chest Pain, No Palpitations  Respiratory:  No Cough, No Dyspnea  Gastrointestinal:  As described in HPI  Musculoskeletal:  No Joint Swelling, No Back Pain  Skin:  No Skin Lesions, No Jaundice  Neuro:  No Syncope, No Dizziness  Heme/Lymph:  No Bruising, No Bleeding.    Physical Examination:  T(C): 36.2 (03-03-23 @ 05:32), Max: 36.4 (03-02-23 @ 14:36)  HR: 78 (03-03-23 @ 05:32) (78 - 82)  BP: 150/65 (03-03-23 @ 05:32) (112/50 - 150/65)  RR: 18 (03-03-23 @ 05:32) (17 - 18)  SpO2: 96% (03-03-23 @ 05:32) (95% - 99%)  Height (cm): 170.2 (03-02-23 @ 23:11)  Weight (kg): 106.141 (03-02-23 @ 23:11)    Constitutional: No acute distress.  Eyes:. Conjunctivae are clear, Sclera is non-icteric.  Ears Nose and Throat: The external ears are normal appearing,  Oral mucosa is pink and moist.  Respiratory:  No signs of respiratory distress. Lung sounds are clear bilaterally.  Cardiovascular:  S1 S2, Regular rate and rhythm.  GI: Abdomen is soft, symmetric, and non-tender without distention. There are no visible lesions or scars. Bowel sounds are present and normoactive in all four quadrants. No masses, hepatomegaly, or splenomegaly are noted.   Neuro: AO*3, no asterixis  Skin: No rashes, No Jaundice.    Data: (reviewed by attending)                        5.1    2.48  )-----------( 125      ( 02 Mar 2023 16:01 )             17.4     Hgb Trend:  5.1  03-02-23 @ 16:01      03-02    143  |  112<H>  |  70<HH>  ----------------------------<  108<H>  4.4   |  20  |  2.7<H>    Ca    8.0<L>      02 Mar 2023 16:01    TPro  5.4<L>  /  Alb  3.2<L>  /  TBili  0.4  /  DBili  x   /  AST  11  /  ALT  <5  /  AlkPhos  70  03-02    Liver panel trend:  TBili 0.4   /   AST 11   /   ALT <5   /   AlkP 70   /   Tptn 5.4   /   Alb 3.2    /   DBili --      03-02    Gastroenterology Consultation:    Patient is a 77y old  Female who presents with a chief complaint of Acute on Chronic Anemia 2/2 Suspected GI bleed (02 Mar 2023 18:52)      Admitted on: 03-02-23  HPI:  77F with PMHx of HTN, HLD, CKD4, Severe AS, Iron deficiency Anemia 2/2 AVM, w/ frequent iron and PRBC transfusions presents to ED for low hemoglobin. Pt went to get her IV venofer transfusion today when outpatient labs showed a Hb of 5.1. Pt reports a chronic history of intermittent exertional dyspnea, exertional light-headedness, and intermittent melena. She was recently admitted in 1/2023 for anemia and was scheduled for an outpatient repeat EGD/Colon/VCE later this month.   She denies any BRBPR, hematochezia, hematemesis, Wt loss, or NSAID use. no blood thinners  She denies any CP, palpitations, abd pain, N/V/C/D, or any  symptoms. Of note, pt was treated for RLE cellulitis 2 weeks ago with 10 days of bactrim, got a RLE duplex which was -ve for DVT.    In the ED: /50, HR 82, T 97F, RR 18 satting 99% on RA. Labs notable for Hb 5.1 (baseline ~8).  s/p 2 units prbc hb 7.3       Prior EGD:  < from: Enteroscopy (10.14.22 @ 12:00) >  Impressions:    Normal mucosa in the whole esophagus.    Angioectasias in the antrum. (Hemostasis).    Multiple small AVMs were noted in duodenal bulb and second part of duodenum.  APCs were applied for the purpose of hemostatsis.    PCF scope for enteroscopy. Proximal jejunum examined. No blood was noted in  jejunum. 2 small non-bleeding AVMs were noted in proximal jejunum. APCs were  applied for the purpose of hemostasis.    < end of copied text >    Prior Colonoscopy: < from: Colonoscopy (10.22.19 @ 14:00) >  Impressions:    Moderate diverticulosis of the sigmoid colon, descending colon, cecum, distal  descending colon and ascending colon.    Grade 2 internal hemorrhoids.    Solid stool in some spots throughout colon.     < end of copied text >        PAST MEDICAL & SURGICAL HISTORY:  HTN (hypertension)  Heart murmur  Eczema  Anemia iron infusions and PRBC transfusions  Aortic stenosis  Chronic kidney disease (CKD)  2019 novel coronavirus disease (COVID-19)  4/2020- REHAB admission  H/O appendicitis  H/O cholecystitis  s/p cholecystectomy    FAMILY HISTORY:  FH: kidney disease (Father)    Social History:  Tobacco: neg   Alcohol: neg   Drugs: neg     Home Medications:  Benadryl 25 mg oral capsule: 1 cap(s) orally once a day (at bedtime), As Needed (02 Mar 2023 18:51)  metoprolol tartrate 25 mg oral tablet: 1 tab(s) orally 2 times a day (02 Mar 2023 18:51)    MEDICATIONS  (STANDING):  pantoprazole Infusion 8 mG/Hr (10 mL/Hr) IV Continuous <Continuous>    MEDICATIONS  (PRN):  acetaminophen     Tablet .. 650 milliGRAM(s) Oral every 6 hours PRN Temp greater or equal to 38C (100.4F), Mild Pain (1 - 3)  melatonin 5 milliGRAM(s) Oral at bedtime PRN Insomnia    Allergies  aspirin (Rash)  latex (Unknown)  penicillins (Unknown)    Review of Systems:   Constitutional:  No Fever, No Chills  ENT/Mouth:  No Hearing Changes   Eyes:  No Eye Pain, No Vision Changes  Cardiovascular:  No Chest Pain, No Palpitations  Respiratory:  No Cough, + Dyspnea  Gastrointestinal:  As described in HPI  Musculoskeletal:  No Joint Swelling, No Back Pain  Neuro:  No Syncope, No Dizziness    Physical Examination:  T(C): 36.2 (03-03-23 @ 05:32), Max: 36.4 (03-02-23 @ 14:36)  HR: 78 (03-03-23 @ 05:32) (78 - 82)  BP: 150/65 (03-03-23 @ 05:32) (112/50 - 150/65)  RR: 18 (03-03-23 @ 05:32) (17 - 18)  SpO2: 96% (03-03-23 @ 05:32) (95% - 99%)  Height (cm): 170.2 (03-02-23 @ 23:11)  Weight (kg): 106.141 (03-02-23 @ 23:11)    Constitutional: No acute distress.  Eyes:. Conjunctivae are clear, Sclera is non-icteric.  Ears Nose and Throat: The external ears are normal appearing,  Oral mucosa is pink and moist.  Respiratory:  No signs of respiratory distress. Lung sounds are clear bilaterally.  Cardiovascular:  S1 S2, Regular rate and rhythm.  GI: Abdomen is soft, symmetric, and non-tender without distention.   Neuro: AO*3, no asterixis  Skin: right lower extremities skin changes     Data: (reviewed by attending)                        5.1    2.48  )-----------( 125      ( 02 Mar 2023 16:01 )             17.4     Hgb Trend:  5.1  03-02-23 @ 16:01      03-02    143  |  112<H>  |  70<HH>  ----------------------------<  108<H>  4.4   |  20  |  2.7<H>    Ca    8.0<L>      02 Mar 2023 16:01    TPro  5.4<L>  /  Alb  3.2<L>  /  TBili  0.4  /  DBili  x   /  AST  11  /  ALT  <5  /  AlkPhos  70  03-02    Liver panel trend:  TBili 0.4   /   AST 11   /   ALT <5   /   AlkP 70   /   Tptn 5.4   /   Alb 3.2    /   DBili --      03-02

## 2023-03-03 NOTE — DISCHARGE NOTE PROVIDER - PROVIDER TOKENS
PROVIDER:[TOKEN:[[id number]],FOLLOWUP:[2 weeks]] PROVIDER:[TOKEN:[[id number]],FOLLOWUP:[1 week],ESTABLISHEDPATIENT:[T]]

## 2023-03-03 NOTE — CONSULT NOTE ADULT - ASSESSMENT
77F with PMHx of HTN, HLD, CKD4, Severe AS, Iron deficiency Anemia 2/2 AVM, w/ frequent iron and PRBC transfusions presents to ED for low hemoglobin.     *Anemia  *Pancytopenia  -HD stable  -no signs of bleeding  -hb 5.1 (baseline ~8) s/p 2 units prbc hb 7.3     rec  -patient will benefit from enteroscopy and repeat colonoscopy  -patient is not willing to stay for procedures next week  -hematology evaluation for pancytopenia  -continue IV iron   -patient to follow with dr De La Cruz as OP    -call as needed, [phone] during weekdays till 5pm and call GI service after 5pm and on weekends [phone]  -Follow up with our [facility] located at [address] [place] [address]. Phone Number: [phone]

## 2023-03-03 NOTE — DISCHARGE NOTE PROVIDER - CARE PROVIDER_API CALL
Hazel Barone (MD)  Gastroenterology  [address] [place] [address]  Phone: [phone]  Fax: [phone]  Follow Up Time: 2 weeks   Umesh Wetzel)  Gastroenterology; Internal Medicine  [address] [place] [id number]  Phone: [phone]  Fax: [phone]  Established Patient  Follow Up Time: 1 week

## 2023-03-06 ENCOUNTER — NON-APPOINTMENT (OUTPATIENT)
Age: 78
End: 2023-03-06

## 2023-03-07 ENCOUNTER — APPOINTMENT (OUTPATIENT)
Dept: CARDIOLOGY | Facility: CLINIC | Age: 78
End: 2023-03-07

## 2023-03-09 DIAGNOSIS — I13.0 HYPERTENSIVE HEART AND CHRONIC KIDNEY DISEASE WITH HEART FAILURE AND STAGE 1 THROUGH STAGE 4 CHRONIC KIDNEY DISEASE, OR UNSPECIFIED CHRONIC KIDNEY DISEASE: ICD-10-CM

## 2023-03-09 DIAGNOSIS — Z90.49 ACQUIRED ABSENCE OF OTHER SPECIFIED PARTS OF DIGESTIVE TRACT: ICD-10-CM

## 2023-03-09 DIAGNOSIS — Z86.16 PERSONAL HISTORY OF COVID-19: ICD-10-CM

## 2023-03-09 DIAGNOSIS — N18.4 CHRONIC KIDNEY DISEASE, STAGE 4 (SEVERE): ICD-10-CM

## 2023-03-09 DIAGNOSIS — D62 ACUTE POSTHEMORRHAGIC ANEMIA: ICD-10-CM

## 2023-03-09 DIAGNOSIS — K31.811 ANGIODYSPLASIA OF STOMACH AND DUODENUM WITH BLEEDING: ICD-10-CM

## 2023-03-09 DIAGNOSIS — E88.09 OTHER DISORDERS OF PLASMA-PROTEIN METABOLISM, NOT ELSEWHERE CLASSIFIED: ICD-10-CM

## 2023-03-09 DIAGNOSIS — Z88.0 ALLERGY STATUS TO PENICILLIN: ICD-10-CM

## 2023-03-09 DIAGNOSIS — K31.819 ANGIODYSPLASIA OF STOMACH AND DUODENUM WITHOUT BLEEDING: ICD-10-CM

## 2023-03-09 DIAGNOSIS — I35.0 NONRHEUMATIC AORTIC (VALVE) STENOSIS: ICD-10-CM

## 2023-03-09 DIAGNOSIS — D64.9 ANEMIA, UNSPECIFIED: ICD-10-CM

## 2023-03-09 DIAGNOSIS — Z91.040 LATEX ALLERGY STATUS: ICD-10-CM

## 2023-03-09 DIAGNOSIS — D61.818 OTHER PANCYTOPENIA: ICD-10-CM

## 2023-03-09 DIAGNOSIS — I50.9 HEART FAILURE, UNSPECIFIED: ICD-10-CM

## 2023-03-09 DIAGNOSIS — I27.20 PULMONARY HYPERTENSION, UNSPECIFIED: ICD-10-CM

## 2023-03-09 DIAGNOSIS — K92.2 GASTROINTESTINAL HEMORRHAGE, UNSPECIFIED: ICD-10-CM

## 2023-03-09 DIAGNOSIS — Z88.5 ALLERGY STATUS TO NARCOTIC AGENT: ICD-10-CM

## 2023-03-10 ENCOUNTER — NON-APPOINTMENT (OUTPATIENT)
Age: 78
End: 2023-03-10

## 2023-03-10 ENCOUNTER — OUTPATIENT (OUTPATIENT)
Dept: OUTPATIENT SERVICES | Facility: HOSPITAL | Age: 78
LOS: 1 days | End: 2023-03-10
Payer: MEDICARE

## 2023-03-10 ENCOUNTER — APPOINTMENT (OUTPATIENT)
Dept: INFUSION THERAPY | Facility: CLINIC | Age: 78
End: 2023-03-10

## 2023-03-10 DIAGNOSIS — Z87.19 PERSONAL HISTORY OF OTHER DISEASES OF THE DIGESTIVE SYSTEM: Chronic | ICD-10-CM

## 2023-03-10 DIAGNOSIS — D64.9 ANEMIA, UNSPECIFIED: ICD-10-CM

## 2023-03-10 PROCEDURE — 96372 THER/PROPH/DIAG INJ SC/IM: CPT

## 2023-03-10 PROCEDURE — 86922 COMPATIBILITY TEST ANTIGLOB: CPT

## 2023-03-10 PROCEDURE — 36415 COLL VENOUS BLD VENIPUNCTURE: CPT

## 2023-03-10 PROCEDURE — P9040: CPT

## 2023-03-10 PROCEDURE — 36430 TRANSFUSION BLD/BLD COMPNT: CPT

## 2023-03-10 PROCEDURE — 86902 BLOOD TYPE ANTIGEN DONOR EA: CPT

## 2023-03-10 RX ORDER — ERYTHROPOIETIN 10000 [IU]/ML
30000 INJECTION, SOLUTION INTRAVENOUS; SUBCUTANEOUS ONCE
Refills: 0 | Status: COMPLETED | OUTPATIENT
Start: 2023-03-10 | End: 2023-03-10

## 2023-03-10 RX ADMIN — ERYTHROPOIETIN 30000 UNIT(S): 10000 INJECTION, SOLUTION INTRAVENOUS; SUBCUTANEOUS at 10:29

## 2023-03-17 ENCOUNTER — OUTPATIENT (OUTPATIENT)
Dept: OUTPATIENT SERVICES | Facility: HOSPITAL | Age: 78
LOS: 1 days | End: 2023-03-17

## 2023-03-17 ENCOUNTER — OUTPATIENT (OUTPATIENT)
Dept: OUTPATIENT SERVICES | Facility: HOSPITAL | Age: 78
LOS: 1 days | End: 2023-03-17
Payer: MEDICARE

## 2023-03-17 ENCOUNTER — APPOINTMENT (OUTPATIENT)
Dept: INFUSION THERAPY | Facility: CLINIC | Age: 78
End: 2023-03-17

## 2023-03-17 DIAGNOSIS — Z87.19 PERSONAL HISTORY OF OTHER DISEASES OF THE DIGESTIVE SYSTEM: Chronic | ICD-10-CM

## 2023-03-17 DIAGNOSIS — D64.9 ANEMIA, UNSPECIFIED: ICD-10-CM

## 2023-03-17 PROCEDURE — 86922 COMPATIBILITY TEST ANTIGLOB: CPT

## 2023-03-17 PROCEDURE — 86902 BLOOD TYPE ANTIGEN DONOR EA: CPT

## 2023-03-17 RX ORDER — ERYTHROPOIETIN 10000 [IU]/ML
30000 INJECTION, SOLUTION INTRAVENOUS; SUBCUTANEOUS ONCE
Refills: 0 | Status: COMPLETED | OUTPATIENT
Start: 2023-03-17 | End: 2023-03-17

## 2023-03-17 RX ADMIN — ERYTHROPOIETIN 30000 UNIT(S): 10000 INJECTION, SOLUTION INTRAVENOUS; SUBCUTANEOUS at 12:05

## 2023-03-18 DIAGNOSIS — D64.9 ANEMIA, UNSPECIFIED: ICD-10-CM

## 2023-03-21 ENCOUNTER — INPATIENT (INPATIENT)
Facility: HOSPITAL | Age: 78
LOS: 4 days | Discharge: LEFT BEFORE DISCHARGE | DRG: 291 | End: 2023-03-26
Attending: STUDENT IN AN ORGANIZED HEALTH CARE EDUCATION/TRAINING PROGRAM | Admitting: STUDENT IN AN ORGANIZED HEALTH CARE EDUCATION/TRAINING PROGRAM
Payer: MEDICARE

## 2023-03-21 VITALS
SYSTOLIC BLOOD PRESSURE: 133 MMHG | DIASTOLIC BLOOD PRESSURE: 66 MMHG | HEART RATE: 64 BPM | HEIGHT: 67 IN | RESPIRATION RATE: 20 BRPM | OXYGEN SATURATION: 100 % | TEMPERATURE: 97 F

## 2023-03-21 DIAGNOSIS — R09.89 OTHER SPECIFIED SYMPTOMS AND SIGNS INVOLVING THE CIRCULATORY AND RESPIRATORY SYSTEMS: ICD-10-CM

## 2023-03-21 DIAGNOSIS — Z87.19 PERSONAL HISTORY OF OTHER DISEASES OF THE DIGESTIVE SYSTEM: Chronic | ICD-10-CM

## 2023-03-21 DIAGNOSIS — R06.00 DYSPNEA, UNSPECIFIED: ICD-10-CM

## 2023-03-21 LAB
ALBUMIN SERPL ELPH-MCNC: 3.3 G/DL — LOW (ref 3.5–5.2)
ALP SERPL-CCNC: 98 U/L — SIGNIFICANT CHANGE UP (ref 30–115)
ALT FLD-CCNC: 6 U/L — SIGNIFICANT CHANGE UP (ref 0–41)
ANION GAP SERPL CALC-SCNC: 7 MMOL/L — SIGNIFICANT CHANGE UP (ref 7–14)
AST SERPL-CCNC: 13 U/L — SIGNIFICANT CHANGE UP (ref 0–41)
BASOPHILS # BLD AUTO: 0.01 K/UL — SIGNIFICANT CHANGE UP (ref 0–0.2)
BASOPHILS NFR BLD AUTO: 0.4 % — SIGNIFICANT CHANGE UP (ref 0–1)
BILIRUB SERPL-MCNC: 1.1 MG/DL — SIGNIFICANT CHANGE UP (ref 0.2–1.2)
BUN SERPL-MCNC: 30 MG/DL — HIGH (ref 10–20)
CALCIUM SERPL-MCNC: 8.3 MG/DL — LOW (ref 8.4–10.5)
CHLORIDE SERPL-SCNC: 113 MMOL/L — HIGH (ref 98–110)
CO2 SERPL-SCNC: 21 MMOL/L — SIGNIFICANT CHANGE UP (ref 17–32)
CREAT SERPL-MCNC: 1.9 MG/DL — HIGH (ref 0.7–1.5)
EGFR: 27 ML/MIN/1.73M2 — LOW
EOSINOPHIL # BLD AUTO: 0.13 K/UL — SIGNIFICANT CHANGE UP (ref 0–0.7)
EOSINOPHIL NFR BLD AUTO: 5.3 % — SIGNIFICANT CHANGE UP (ref 0–8)
GAS PNL BLDV: SIGNIFICANT CHANGE UP
GLUCOSE SERPL-MCNC: 82 MG/DL — SIGNIFICANT CHANGE UP (ref 70–99)
HCT VFR BLD CALC: 27 % — LOW (ref 37–47)
HGB BLD-MCNC: 7.8 G/DL — LOW (ref 12–16)
IMM GRANULOCYTES NFR BLD AUTO: 0.4 % — HIGH (ref 0.1–0.3)
LYMPHOCYTES # BLD AUTO: 0.51 K/UL — LOW (ref 1.2–3.4)
LYMPHOCYTES # BLD AUTO: 21 % — SIGNIFICANT CHANGE UP (ref 20.5–51.1)
MAGNESIUM SERPL-MCNC: 2.1 MG/DL — SIGNIFICANT CHANGE UP (ref 1.8–2.4)
MCHC RBC-ENTMCNC: 28.1 PG — SIGNIFICANT CHANGE UP (ref 27–31)
MCHC RBC-ENTMCNC: 28.9 G/DL — LOW (ref 32–37)
MCV RBC AUTO: 97.1 FL — SIGNIFICANT CHANGE UP (ref 81–99)
MONOCYTES # BLD AUTO: 0.27 K/UL — SIGNIFICANT CHANGE UP (ref 0.1–0.6)
MONOCYTES NFR BLD AUTO: 11.1 % — HIGH (ref 1.7–9.3)
NEUTROPHILS # BLD AUTO: 1.5 K/UL — SIGNIFICANT CHANGE UP (ref 1.4–6.5)
NEUTROPHILS NFR BLD AUTO: 61.8 % — SIGNIFICANT CHANGE UP (ref 42.2–75.2)
NRBC # BLD: 0 /100 WBCS — SIGNIFICANT CHANGE UP (ref 0–0)
NT-PROBNP SERPL-SCNC: 2369 PG/ML — HIGH (ref 0–300)
PLATELET # BLD AUTO: 97 K/UL — LOW (ref 130–400)
POTASSIUM SERPL-MCNC: 5.7 MMOL/L — HIGH (ref 3.5–5)
POTASSIUM SERPL-SCNC: 5.7 MMOL/L — HIGH (ref 3.5–5)
PROT SERPL-MCNC: 5.6 G/DL — LOW (ref 6–8)
RBC # BLD: 2.78 M/UL — LOW (ref 4.2–5.4)
RBC # FLD: 18.1 % — HIGH (ref 11.5–14.5)
SARS-COV-2 RNA SPEC QL NAA+PROBE: SIGNIFICANT CHANGE UP
SODIUM SERPL-SCNC: 141 MMOL/L — SIGNIFICANT CHANGE UP (ref 135–146)
TROPONIN T SERPL-MCNC: <0.01 NG/ML — SIGNIFICANT CHANGE UP
WBC # BLD: 2.43 K/UL — LOW (ref 4.8–10.8)
WBC # FLD AUTO: 2.43 K/UL — LOW (ref 4.8–10.8)

## 2023-03-21 PROCEDURE — 85018 HEMOGLOBIN: CPT

## 2023-03-21 PROCEDURE — 84132 ASSAY OF SERUM POTASSIUM: CPT

## 2023-03-21 PROCEDURE — 71045 X-RAY EXAM CHEST 1 VIEW: CPT

## 2023-03-21 PROCEDURE — 86902 BLOOD TYPE ANTIGEN DONOR EA: CPT

## 2023-03-21 PROCEDURE — 83010 ASSAY OF HAPTOGLOBIN QUANT: CPT

## 2023-03-21 PROCEDURE — 36415 COLL VENOUS BLD VENIPUNCTURE: CPT

## 2023-03-21 PROCEDURE — 93306 TTE W/DOPPLER COMPLETE: CPT

## 2023-03-21 PROCEDURE — 86140 C-REACTIVE PROTEIN: CPT

## 2023-03-21 PROCEDURE — 84295 ASSAY OF SERUM SODIUM: CPT

## 2023-03-21 PROCEDURE — 97162 PT EVAL MOD COMPLEX 30 MIN: CPT | Mod: GP

## 2023-03-21 PROCEDURE — 82310 ASSAY OF CALCIUM: CPT

## 2023-03-21 PROCEDURE — 84100 ASSAY OF PHOSPHORUS: CPT

## 2023-03-21 PROCEDURE — 82803 BLOOD GASES ANY COMBINATION: CPT

## 2023-03-21 PROCEDURE — 83540 ASSAY OF IRON: CPT

## 2023-03-21 PROCEDURE — 99285 EMERGENCY DEPT VISIT HI MDM: CPT

## 2023-03-21 PROCEDURE — 93010 ELECTROCARDIOGRAM REPORT: CPT | Mod: 77

## 2023-03-21 PROCEDURE — 85014 HEMATOCRIT: CPT

## 2023-03-21 PROCEDURE — 71045 X-RAY EXAM CHEST 1 VIEW: CPT | Mod: 26

## 2023-03-21 PROCEDURE — 83550 IRON BINDING TEST: CPT

## 2023-03-21 PROCEDURE — 83036 HEMOGLOBIN GLYCOSYLATED A1C: CPT

## 2023-03-21 PROCEDURE — 86077 PHYS BLOOD BANK SERV XMATCH: CPT

## 2023-03-21 PROCEDURE — 36430 TRANSFUSION BLD/BLD COMPNT: CPT

## 2023-03-21 PROCEDURE — 83615 LACTATE (LD) (LDH) ENZYME: CPT

## 2023-03-21 PROCEDURE — 85246 CLOT FACTOR VIII VW ANTIGEN: CPT

## 2023-03-21 PROCEDURE — 93970 EXTREMITY STUDY: CPT | Mod: 26

## 2023-03-21 PROCEDURE — 94660 CPAP INITIATION&MGMT: CPT

## 2023-03-21 PROCEDURE — 93005 ELECTROCARDIOGRAM TRACING: CPT

## 2023-03-21 PROCEDURE — 85245 CLOT FACTOR VIII VW RISTOCTN: CPT

## 2023-03-21 PROCEDURE — 99223 1ST HOSP IP/OBS HIGH 75: CPT

## 2023-03-21 PROCEDURE — 94760 N-INVAS EAR/PLS OXIMETRY 1: CPT

## 2023-03-21 PROCEDURE — 85384 FIBRINOGEN ACTIVITY: CPT

## 2023-03-21 PROCEDURE — 71250 CT THORAX DX C-: CPT

## 2023-03-21 PROCEDURE — 85610 PROTHROMBIN TIME: CPT

## 2023-03-21 PROCEDURE — 83970 ASSAY OF PARATHORMONE: CPT

## 2023-03-21 PROCEDURE — 84145 PROCALCITONIN (PCT): CPT

## 2023-03-21 PROCEDURE — 85025 COMPLETE CBC W/AUTO DIFF WBC: CPT

## 2023-03-21 PROCEDURE — 85652 RBC SED RATE AUTOMATED: CPT

## 2023-03-21 PROCEDURE — 85385 FIBRINOGEN ANTIGEN: CPT

## 2023-03-21 PROCEDURE — 82746 ASSAY OF FOLIC ACID SERUM: CPT

## 2023-03-21 PROCEDURE — 82607 VITAMIN B-12: CPT

## 2023-03-21 PROCEDURE — 82330 ASSAY OF CALCIUM: CPT

## 2023-03-21 PROCEDURE — 93308 TTE F-UP OR LMTD: CPT | Mod: 26

## 2023-03-21 PROCEDURE — 76604 US EXAM CHEST: CPT | Mod: 26

## 2023-03-21 PROCEDURE — 82728 ASSAY OF FERRITIN: CPT

## 2023-03-21 PROCEDURE — 83921 ORGANIC ACID SINGLE QUANT: CPT

## 2023-03-21 PROCEDURE — 92610 EVALUATE SWALLOWING FUNCTION: CPT | Mod: GN

## 2023-03-21 PROCEDURE — 83090 ASSAY OF HOMOCYSTEINE: CPT

## 2023-03-21 PROCEDURE — 86922 COMPATIBILITY TEST ANTIGLOB: CPT

## 2023-03-21 PROCEDURE — 80048 BASIC METABOLIC PNL TOTAL CA: CPT

## 2023-03-21 PROCEDURE — 85730 THROMBOPLASTIN TIME PARTIAL: CPT

## 2023-03-21 PROCEDURE — 86870 RBC ANTIBODY IDENTIFICATION: CPT

## 2023-03-21 PROCEDURE — P9040: CPT

## 2023-03-21 PROCEDURE — 85045 AUTOMATED RETICULOCYTE COUNT: CPT

## 2023-03-21 PROCEDURE — 80053 COMPREHEN METABOLIC PANEL: CPT

## 2023-03-21 PROCEDURE — 85027 COMPLETE CBC AUTOMATED: CPT

## 2023-03-21 PROCEDURE — 83605 ASSAY OF LACTIC ACID: CPT

## 2023-03-21 PROCEDURE — 80061 LIPID PANEL: CPT

## 2023-03-21 PROCEDURE — 83735 ASSAY OF MAGNESIUM: CPT

## 2023-03-21 PROCEDURE — 82306 VITAMIN D 25 HYDROXY: CPT

## 2023-03-21 RX ORDER — VANCOMYCIN HCL 1 G
1000 VIAL (EA) INTRAVENOUS ONCE
Refills: 0 | Status: COMPLETED | OUTPATIENT
Start: 2023-03-21 | End: 2023-03-21

## 2023-03-21 RX ORDER — POLYETHYLENE GLYCOL 3350 17 G/17G
17 POWDER, FOR SOLUTION ORAL DAILY
Refills: 0 | Status: DISCONTINUED | OUTPATIENT
Start: 2023-03-21 | End: 2023-03-26

## 2023-03-21 RX ORDER — ONDANSETRON 8 MG/1
4 TABLET, FILM COATED ORAL EVERY 8 HOURS
Refills: 0 | Status: DISCONTINUED | OUTPATIENT
Start: 2023-03-21 | End: 2023-03-26

## 2023-03-21 RX ORDER — ACETAMINOPHEN 500 MG
650 TABLET ORAL EVERY 6 HOURS
Refills: 0 | Status: DISCONTINUED | OUTPATIENT
Start: 2023-03-21 | End: 2023-03-26

## 2023-03-21 RX ORDER — LANOLIN ALCOHOL/MO/W.PET/CERES
3 CREAM (GRAM) TOPICAL AT BEDTIME
Refills: 0 | Status: DISCONTINUED | OUTPATIENT
Start: 2023-03-21 | End: 2023-03-26

## 2023-03-21 RX ORDER — FOLIC ACID 0.8 MG
1 TABLET ORAL DAILY
Refills: 0 | Status: DISCONTINUED | OUTPATIENT
Start: 2023-03-21 | End: 2023-03-26

## 2023-03-21 RX ORDER — AZTREONAM 2 G
VIAL (EA) INJECTION
Refills: 0 | Status: DISCONTINUED | OUTPATIENT
Start: 2023-03-21 | End: 2023-03-22

## 2023-03-21 RX ORDER — METOPROLOL TARTRATE 50 MG
25 TABLET ORAL
Refills: 0 | Status: DISCONTINUED | OUTPATIENT
Start: 2023-03-21 | End: 2023-03-26

## 2023-03-21 RX ORDER — DEXTROSE 50 % IN WATER 50 %
50 SYRINGE (ML) INTRAVENOUS ONCE
Refills: 0 | Status: DISCONTINUED | OUTPATIENT
Start: 2023-03-21 | End: 2023-03-21

## 2023-03-21 RX ORDER — INSULIN HUMAN 100 [IU]/ML
10 INJECTION, SOLUTION SUBCUTANEOUS ONCE
Refills: 0 | Status: DISCONTINUED | OUTPATIENT
Start: 2023-03-21 | End: 2023-03-21

## 2023-03-21 RX ORDER — SODIUM ZIRCONIUM CYCLOSILICATE 10 G/10G
10 POWDER, FOR SUSPENSION ORAL ONCE
Refills: 0 | Status: COMPLETED | OUTPATIENT
Start: 2023-03-21 | End: 2023-03-21

## 2023-03-21 RX ORDER — SENNA PLUS 8.6 MG/1
2 TABLET ORAL AT BEDTIME
Refills: 0 | Status: DISCONTINUED | OUTPATIENT
Start: 2023-03-21 | End: 2023-03-26

## 2023-03-21 RX ORDER — PANTOPRAZOLE SODIUM 20 MG/1
40 TABLET, DELAYED RELEASE ORAL
Refills: 0 | Status: DISCONTINUED | OUTPATIENT
Start: 2023-03-21 | End: 2023-03-26

## 2023-03-21 RX ORDER — DEXTROSE 50 % IN WATER 50 %
50 SYRINGE (ML) INTRAVENOUS ONCE
Refills: 0 | Status: COMPLETED | OUTPATIENT
Start: 2023-03-21 | End: 2023-03-21

## 2023-03-21 RX ORDER — FUROSEMIDE 40 MG
40 TABLET ORAL ONCE
Refills: 0 | Status: COMPLETED | OUTPATIENT
Start: 2023-03-21 | End: 2023-03-21

## 2023-03-21 RX ORDER — ERYTHROPOIETIN 10000 [IU]/ML
30000 INJECTION, SOLUTION INTRAVENOUS; SUBCUTANEOUS
Refills: 0 | Status: DISCONTINUED | OUTPATIENT
Start: 2023-03-21 | End: 2023-03-26

## 2023-03-21 RX ORDER — INSULIN HUMAN 100 [IU]/ML
10 INJECTION, SOLUTION SUBCUTANEOUS ONCE
Refills: 0 | Status: COMPLETED | OUTPATIENT
Start: 2023-03-21 | End: 2023-03-21

## 2023-03-21 RX ORDER — ENOXAPARIN SODIUM 100 MG/ML
40 INJECTION SUBCUTANEOUS EVERY 24 HOURS
Refills: 0 | Status: DISCONTINUED | OUTPATIENT
Start: 2023-03-21 | End: 2023-03-26

## 2023-03-21 RX ADMIN — INSULIN HUMAN 10 UNIT(S): 100 INJECTION, SOLUTION SUBCUTANEOUS at 16:56

## 2023-03-21 RX ADMIN — SODIUM ZIRCONIUM CYCLOSILICATE 10 GRAM(S): 10 POWDER, FOR SUSPENSION ORAL at 23:12

## 2023-03-21 RX ADMIN — Medication 250 MILLIGRAM(S): at 16:56

## 2023-03-21 RX ADMIN — Medication 50 MILLILITER(S): at 16:56

## 2023-03-21 RX ADMIN — Medication 40 MILLIGRAM(S): at 16:55

## 2023-03-21 NOTE — ED PROVIDER NOTE - OBJECTIVE STATEMENT
77 year old female with a history of HTN, HLD, CKD4, Severe AS, Pancytopenia 2/2 obscure GI bleeding (from Duodenum and Gastric AVM capsule endoscopy 02/2022 and enteroscopy 10/14 found to have multiple AVMS in duodenum), DARRIN (follows with Dr Gaston) w/ frequent iron and PRBC transfusions presents to the ED with shortness of breath and chest pain. patient reports left sided chest tightness and discomfort associated with dyspnea on minimal exertion and dry cough. Patient reports that she feels as though she may need a transfusion. Admits to R>L lower extremity edema associated with redness of the right lower leg. She was on Abx 1 week ago for cellulitis completed the coarse but still has some redness. Denies fever, chills, palpitations, nausea, vomiting, abdominal pain, diarrhea, dysuria.

## 2023-03-21 NOTE — ED ADULT NURSE REASSESSMENT NOTE - NS ED NURSE REASSESS COMMENT FT1
Pt. received from previous RN. Pt. lying in stretcher, breathing with 2L O2 via nasal cannula. Pt. A&O x4. Pt. on CCM. Pt. connected to [facility]. Access via 19g port noted to the R chest well. Awaiting clean bed assignment.

## 2023-03-21 NOTE — H&P ADULT - HISTORY OF PRESENT ILLNESS
77-year-old female w/ history of HTN, HLD, CKD4, Severe AS, mild PHTN, Pancytopenia 2/2 covid?, hx GI bleeding (from Duodenum and Gastric AVM capsule endoscopy 02/2022 and enteroscopy 10/14 found to have multiple AVMS in duodenum, gastric antral vascular ectasia, Mod diverticulosis), hx DARRIN (follows with Dr Gaston) w/ frequent iron and PRBC transfusions, CAD s/p stents,  presents to the ED with shortness of breath and chest pain. Patient reports left sided chest tightness and discomfort associated with dyspnea on minimal exertion and dry cough. Patient reports that she feels as though she may need a transfusion. Admits to R>L lower extremity edema associated with redness of the right lower leg. She was on Antibiotics 1 week ago for cellulitis completed the coarse but still has some redness. Denies fever, chills, palpitations, nausea, vomiting, abdominal pain, diarrhea, dysuria.    s/p vanco  s/p Lasix 40 IVP x 1    Dyspnea  Fluid overload  Cellulitis of right leg  Pancytopenia  Chronic GI bleeding  Hyperkalemia.    PMHx:Internal hemorrhoids, erosive gastritis, sciatica, uterine fibroid, T12 compression deformity, avascular necrosis of right femoral head, aortic & iliac calcifications  PSHx cholecystectomy, appendectomy    In ED, VS: T 36.3, /66, HR 64, RR 20, SpO2 100% on 2 L/min NC 77-year-old female w/ history of HTN, HLD, CKD4, Severe AS, mild PHTN, Pancytopenia 2/2 covid?, hx GI bleeding (from Duodenum and Gastric AVM capsule endoscopy 02/2022 and enteroscopy 10/14 found to have multiple AVMS in duodenum, gastric antral vascular ectasia, Mod diverticulosis), hx DARRIN (follows with Dr Gaston) w/ frequent iron and PRBC transfusions, presents to the ED with shortness of breath and left pleuritic chest pain. Patient reports left sided chest tightness and discomfort associated with dyspnea on minimal exertion and dry cough. Patient reports that she feels as though she may need a transfusion. Admits to R>L lower extremity edema associated with redness of the right lower leg. She was on Antibiotics (Bactrim?) + Torsemide 20 for ~ 8 days for cellulitis completed the coarse but still has some redness. Reports waxing and waning redness. Denies fever, chills, palpitations, nausea, vomiting, abdominal pain, diarrhea, dysuria.     s/p vanco  s/p Lasix 40 IVP x 1  s/p Insulin + D50    PMHx:Internal hemorrhoids, erosive gastritis, sciatica, uterine fibroid, T12 compression deformity, avascular necrosis of right femoral head, aortic & iliac calcifications  PSHx cholecystectomy, appendectomy    In ED, VS: T 36.3, /66, HR 64, RR 20, SpO2 100% on 2 L/min NC

## 2023-03-21 NOTE — H&P ADULT - ASSESSMENT
77-year-old female w/ history of HTN, HLD, CKD4, Severe AS, mild PHTN, Pancytopenia 2/2 covid?, hx GI bleeding (from Duodenum and Gastric AVM capsule endoscopy 02/2022 and enteroscopy 10/14 found to have multiple AVMS in duodenum, gastric antral vascular ectasia, Mod diverticulosis), hx DARRIN (follows with Dr Gaston) w/ frequent iron and PRBC transfusions, CAD s/p stents,  presents to the ED with shortness of breath and chest pain. Patient reports left sided chest tightness and discomfort associated with dyspnea on minimal exertion and dry cough. Patient reports that she feels as though she may need a transfusion. Admits to R>L lower extremity edema associated with redness of the right lower leg. She was on Antibiotics 1 week ago for cellulitis completed the coarse but still has some redness. Denies fever, chills, palpitations, nausea, vomiting, abdominal pain, diarrhea, dysuria.    s/p vanco  s/p Lasix 40 IVP x 1    Dyspnea  Fluid overload  Cellulitis of right leg  Pancytopenia  Chronic GI bleeding  Hyperkalemia.    PMHx:Internal hemorrhoids, erosive gastritis, sciatica, uterine fibroid, T12 compression deformity, avascular necrosis of right femoral head, aortic & iliac calcifications   77-year-old female w/ history of HTN, HLD, CKD4, Severe AS, mild PHTN, Pancytopenia 2/2 covid?, hx GI bleeding (from Duodenum and Gastric AVM capsule endoscopy 02/2022 and enteroscopy 10/14 found to have multiple 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 Patient reports left sided chest tightness and discomfort associated with dyspnea on minimal exertion and dry cough. Patient reports that she feels as though she may need a transfusion. Admits to R>L lower extremity edema associated with redness of the right lower leg. She was on Antibiotics (Bactrim?) + Torsemide 20 for ~ 8 days for cellulitis completed the coarse but still has some redness. Reports waxing and waning redness. Denies fever, chills, palpitations, nausea, vomiting, abdominal pain, diarrhea, dysuria.     # HAP  s/p vanco  s/p Lasix 40 IVP x 1  s/p Insulin + D50  - Aztreonam (hx penicillin allergy, reports rash & hives) + Levofloxacin for now (will give 1st dose after BCx)  - Consider adding an aminoglycoside if poor response (avoid for now due to nephrotoxicity)  - Patient has recent hospitalization but no IV Antibiotics use -> no vanco for now  - adjust meds to [facility]  - MRSA swab  - Procal  - c/s PT  - c/s S&S (atypical location for Asp PNA, but reports hx aspiration)    # Stasis dermaitis  - Silvadine  - foot care  - Resume Torsemide (patient has not been taking it)  - TTE (last echo Feb 2022)  - f/u Duplex, last duplex on Feb 2023 was -ve    # HTN  # HLD  # CKD4  # Severe AS  # mild PHTN  # aortic & iliac calcifications  - Patient develops bleeding on aspirin  - Planned valve replacement  - f/u a1c, lipid profile  - c/w metoprolol    # Hyperkalemia  # Constipation  last BM 4 days prior to admission  - c/w Miralax  - start senna  - s/p insulin + D50, s/p lokelma    # Pancytopenia 2/2 covid?  # hx DARRIN  # Normocytic anemia  flow cytometry appears -ve  - hx borderline B12 w/ elevated MMA & homocysteine  - Unclear if patient received B12  - recheck above studies  - last ferritin was low, recheck  follows with Dr Gaston w/ frequent iron and PRBC transfusions  - f/u retic, B12, folate, iron, TIBC, ferritin, fibrinogen, vWF, LDH, haptoglobin, PT, PTT    # hx GI bleeding   # Duodenum and Gastric AVM capsule endoscopy 02/2022 and enteroscopy 10/14   # multiple AVMs in duodenum  # gastric antral vascular ectasia  # Mod diverticulosis  # Internal hemorrhoids  # erosive gastritis  - high fiber diet  - consider c/s GI for endoscopy and colonoscopy (planned last visit, but patient wanted it to be done outpatient)  - Patient had appointment w/ Dr Paula tomorrow for risk stratification for endoscopy. Previous cardio note for endoscopy risk found on 10/12/22 on [place]  - PPI bid as per GI    # sciatica  - Tylenol prn    # uterine fibroid  # T12 compression deformity  # avascular necrosis of right femoral head  - outpatient f/u

## 2023-03-21 NOTE — ED PROVIDER NOTE - ATTENDING APP SHARED VISIT CONTRIBUTION OF CARE
77-year-old female with h/o anemia requiring weekly transfusions, duodenal AVMs, CKD, CAD, HTN, HLD, AS in ER with c/o of chest tightness.  Patient states for the past day having intermittent episodes of tightness across his chest.  Also having mild KANG.  No cough, no fever.  No abdominal pain.  No N/V/D.  Also having RLE redness/swelling.  States she had negative duplex 2 weeks ago, her heme-onc doctor was treating her with Lasix and antibiotics, but states she finished medication.  PE - nad, nc/at, eomi, perrl, op - clear, mmm, neck supple, + normal WOB, cta b/l, no w/r/r, rrr, abd- soft, nt/nd, nabs, from x 4, RLE + lower leg erythema/swelling, 2+ DP pulse, A&O x 3, cn 2-12 intact, no focal motor/sensory deficits.   -Cardiac work-up, LE duplex 77-year-old female with h/o anemia requiring weekly transfusions, duodenal AVMs, CKD, CAD, HTN, HLD, AS in ER with c/o of chest tightness.  Patient states for the past day having intermittent episodes of tightness across chest.  Also having mild KANG.  No cough, no fever.  No abdominal pain.  No N/V/D.  Also having RLE redness/swelling.  States she had negative duplex 2 weeks ago, her heme-onc doctor was treating her with Lasix and antibiotics, but states she finished medication.  PE - nad, nc/at, eomi, perrl, op - clear, mmm, neck supple, + normal WOB, cta b/l, no w/r/r, rrr, abd- soft, nt/nd, nabs, from x 4, RLE + lower leg erythema/swelling, 2+ DP pulse, A&O x 3, cn 2-12 intact, no focal motor/sensory deficits.   -Cardiac work-up, LE duplex

## 2023-03-21 NOTE — H&P ADULT - NSHPPHYSICALEXAM_GEN_ALL_CORE
VITAL SIGNS: AFebrile, vital signs stable  CONSTITUTIONAL: Well-developed; well-nourished; in no acute distress.  SKIN: Erythema, tenderness, & warmth of right LE w/ bullae; 1+ b/l LE edema to upper shin  HEAD: Normocephalic; atraumatic.  EYES: Extraocular movements intact; conjunctiva and sclera clear.  ENT: No nasal discharge; airway clear. Moist mucus membranes.  NECK: Supple; non tender. No rigidity. No JVD/HJR  CARD: Regular rate and rhythm. Normal S1, S2; Significant aortic systolic murmur heard on other locations as well.  RESP: Bibasilar crackles, L>R  ABD: Abdomen soft; non-tender; non-distended;   EXT: Normal ROM. No calf tenderness to palpation.  NEURO: Alert and oriented x 3. Moves b/l UE & LE. no slurred speech, hard of hearing.  PSYCH: Cooperative, appropriate.

## 2023-03-21 NOTE — ED PROVIDER NOTE - PHYSICAL EXAMINATION
Physical Exam    Constitutional: No acute distress.   Eyes: Conjunctiva pink, Sclera clear, PERRLA, EOMI.  ENT: No sinus tenderness. No nasal discharge. No oropharyngeal erythema, edema, or exudates. Uvula midline.   Cardiovascular: Regular rate, regular rhythm. No noted murmurs rubs or gallops.  Respiratory: unlabored respiratory effort, clear to auscultation bilaterally no wheezing, rales or rhonchi  Gastrointestinal: Normal bowel sounds. soft, non distended, non-tender abdomen.   Musculoskeletal: supple neck, no midline tenderness. No joint or bony deformity.   Integumentary: warm, dry, no rash  Extremities: right lower extremity edema with erythema and warmth  Neurologic: awake, alert, cranial nerves II-XII grossly intact, extremities’ motor and sensory functions grossly intact

## 2023-03-21 NOTE — ED ADULT TRIAGE NOTE - CHIEF COMPLAINT QUOTE
c/o chest tightness and SOB x few days, Patient states she missed her recent blood transfusion,+ low Hgb, Hx anemia with infusions

## 2023-03-21 NOTE — ED PROVIDER NOTE - CLINICAL SUMMARY MEDICAL DECISION MAKING FREE TEXT BOX
77-year-old female with PMHx as noted, in ER with c/o of intermittent episodes of CP, mild KANG, RL the redness/swelling (being treated with Lasix and antibiotics by her heme-onc doctor, recently finished medication).  Labs reviewed: WBC 2.4, Hgb 7.8, platelet 97, BUN/creatinine 30/1.9, K+ 5.7, troponin negative, BNP 2,369.  EKG - NSR @65, RBBB.  CXR with congestive changes. Prelim LE dupex neg for DVT.  Patient treated for hyperkalemia, given IV antibiotics as well as Lasix.  Patient admitted to telemetry for continued treatment and evaluation.

## 2023-03-21 NOTE — ED PROVIDER NOTE - CARE PLAN
Principal Discharge DX:	Dyspnea  Secondary Diagnosis:	Fluid overload  Secondary Diagnosis:	Cellulitis of right leg  Secondary Diagnosis:	Pancytopenia  Secondary Diagnosis:	Chronic GI bleeding  Secondary Diagnosis:	Hyperkalemia   1

## 2023-03-21 NOTE — ED ADULT NURSE NOTE - NSIMPLEMENTINTERV_GEN_ALL_ED
Implemented All Fall with Harm Risk Interventions:  [place] to call system. Call bell, personal items and telephone within reach. Instruct patient to call for assistance. Room bathroom lighting operational. Non-slip footwear when patient is off stretcher. Physically safe environment: no spills, clutter or unnecessary equipment. Stretcher in lowest position, wheels locked, appropriate side rails in place. Provide visual cue, wrist band, yellow gown, etc. Monitor gait and stability. Monitor for mental status changes and reorient to person, place, and time. Review medications for side effects contributing to fall risk. Reinforce activity limits and safety measures with patient and family. Provide visual clues: red socks.

## 2023-03-22 ENCOUNTER — APPOINTMENT (OUTPATIENT)
Dept: OTOLARYNGOLOGY | Facility: CLINIC | Age: 78
End: 2023-03-22

## 2023-03-22 ENCOUNTER — APPOINTMENT (OUTPATIENT)
Dept: CARDIOLOGY | Facility: CLINIC | Age: 78
End: 2023-03-22

## 2023-03-22 LAB
24R-OH-CALCIDIOL SERPL-MCNC: 7 NG/ML — LOW (ref 30–80)
24R-OH-CALCIDIOL SERPL-MCNC: <6 NG/ML — LOW (ref 30–80)
A1C WITH ESTIMATED AVERAGE GLUCOSE RESULT: 4.3 % — SIGNIFICANT CHANGE UP (ref 4–5.6)
A1C WITH ESTIMATED AVERAGE GLUCOSE RESULT: 4.5 % — SIGNIFICANT CHANGE UP (ref 4–5.6)
ALBUMIN SERPL ELPH-MCNC: 3.1 G/DL — LOW (ref 3.5–5.2)
ALBUMIN SERPL ELPH-MCNC: 3.1 G/DL — LOW (ref 3.5–5.2)
ALP SERPL-CCNC: 90 U/L — SIGNIFICANT CHANGE UP (ref 30–115)
ALP SERPL-CCNC: 94 U/L — SIGNIFICANT CHANGE UP (ref 30–115)
ALT FLD-CCNC: 5 U/L — SIGNIFICANT CHANGE UP (ref 0–41)
ALT FLD-CCNC: 5 U/L — SIGNIFICANT CHANGE UP (ref 0–41)
ANION GAP SERPL CALC-SCNC: 5 MMOL/L — LOW (ref 7–14)
ANION GAP SERPL CALC-SCNC: 6 MMOL/L — LOW (ref 7–14)
APTT BLD: 29.6 SEC — SIGNIFICANT CHANGE UP (ref 27–39.2)
APTT BLD: 32.8 SEC — SIGNIFICANT CHANGE UP (ref 27–39.2)
AST SERPL-CCNC: 11 U/L — SIGNIFICANT CHANGE UP (ref 0–41)
AST SERPL-CCNC: 11 U/L — SIGNIFICANT CHANGE UP (ref 0–41)
BASE EXCESS BLDV CALC-SCNC: -2.9 MMOL/L — LOW (ref -2–3)
BASOPHILS # BLD AUTO: 0.01 K/UL — SIGNIFICANT CHANGE UP (ref 0–0.2)
BASOPHILS # BLD AUTO: 0.01 K/UL — SIGNIFICANT CHANGE UP (ref 0–0.2)
BASOPHILS NFR BLD AUTO: 0.3 % — SIGNIFICANT CHANGE UP (ref 0–1)
BASOPHILS NFR BLD AUTO: 0.4 % — SIGNIFICANT CHANGE UP (ref 0–1)
BILIRUB SERPL-MCNC: 1 MG/DL — SIGNIFICANT CHANGE UP (ref 0.2–1.2)
BILIRUB SERPL-MCNC: 1.1 MG/DL — SIGNIFICANT CHANGE UP (ref 0.2–1.2)
BUN SERPL-MCNC: 30 MG/DL — HIGH (ref 10–20)
BUN SERPL-MCNC: 31 MG/DL — HIGH (ref 10–20)
CA-I SERPL-SCNC: 1.2 MMOL/L — SIGNIFICANT CHANGE UP (ref 1.15–1.33)
CALCIUM SERPL-MCNC: 8.3 MG/DL — LOW (ref 8.4–10.4)
CALCIUM SERPL-MCNC: 8.4 MG/DL — SIGNIFICANT CHANGE UP (ref 8.4–10.4)
CHLORIDE SERPL-SCNC: 110 MMOL/L — SIGNIFICANT CHANGE UP (ref 98–110)
CHLORIDE SERPL-SCNC: 110 MMOL/L — SIGNIFICANT CHANGE UP (ref 98–110)
CHOLEST SERPL-MCNC: 92 MG/DL — SIGNIFICANT CHANGE UP
CHOLEST SERPL-MCNC: 92 MG/DL — SIGNIFICANT CHANGE UP
CO2 SERPL-SCNC: 24 MMOL/L — SIGNIFICANT CHANGE UP (ref 17–32)
CO2 SERPL-SCNC: 25 MMOL/L — SIGNIFICANT CHANGE UP (ref 17–32)
CREAT SERPL-MCNC: 2.2 MG/DL — HIGH (ref 0.7–1.5)
CREAT SERPL-MCNC: 2.3 MG/DL — HIGH (ref 0.7–1.5)
CRP SERPL-MCNC: 11.6 MG/L — HIGH
EGFR: 21 ML/MIN/1.73M2 — LOW
EGFR: 22 ML/MIN/1.73M2 — LOW
EOSINOPHIL # BLD AUTO: 0.13 K/UL — SIGNIFICANT CHANGE UP (ref 0–0.7)
EOSINOPHIL # BLD AUTO: 0.16 K/UL — SIGNIFICANT CHANGE UP (ref 0–0.7)
EOSINOPHIL NFR BLD AUTO: 4.5 % — SIGNIFICANT CHANGE UP (ref 0–8)
EOSINOPHIL NFR BLD AUTO: 5.9 % — SIGNIFICANT CHANGE UP (ref 0–8)
ERYTHROCYTE [SEDIMENTATION RATE] IN BLOOD: 18 MM/HR — SIGNIFICANT CHANGE UP (ref 0–20)
ESTIMATED AVERAGE GLUCOSE: 77 MG/DL — SIGNIFICANT CHANGE UP (ref 68–114)
ESTIMATED AVERAGE GLUCOSE: 82 MG/DL — SIGNIFICANT CHANGE UP (ref 68–114)
FERRITIN SERPL-MCNC: 35 NG/ML — SIGNIFICANT CHANGE UP (ref 15–150)
FIBRINOGEN PPP-MCNC: 466 MG/DL — SIGNIFICANT CHANGE UP (ref 204.4–570.6)
FOLATE SERPL-MCNC: 17.7 NG/ML — SIGNIFICANT CHANGE UP
GAS PNL BLDV: 139 MMOL/L — SIGNIFICANT CHANGE UP (ref 136–145)
GAS PNL BLDV: SIGNIFICANT CHANGE UP
GLUCOSE SERPL-MCNC: 78 MG/DL — SIGNIFICANT CHANGE UP (ref 70–99)
GLUCOSE SERPL-MCNC: 80 MG/DL — SIGNIFICANT CHANGE UP (ref 70–99)
HAPTOGLOB SERPL-MCNC: 70 MG/DL — SIGNIFICANT CHANGE UP (ref 34–200)
HCO3 BLDV-SCNC: 24 MMOL/L — SIGNIFICANT CHANGE UP (ref 22–29)
HCT VFR BLD CALC: 25.2 % — LOW (ref 37–47)
HCT VFR BLD CALC: 25.5 % — LOW (ref 37–47)
HCT VFR BLDA CALC: 31 % — LOW (ref 39–51)
HDLC SERPL-MCNC: 40 MG/DL — LOW
HDLC SERPL-MCNC: 42 MG/DL — LOW
HGB BLD CALC-MCNC: 10.2 G/DL — LOW (ref 12.6–17.4)
HGB BLD-MCNC: 7.4 G/DL — LOW (ref 12–16)
HGB BLD-MCNC: 7.6 G/DL — LOW (ref 12–16)
IMM GRANULOCYTES NFR BLD AUTO: 0 % — LOW (ref 0.1–0.3)
IMM GRANULOCYTES NFR BLD AUTO: 0.4 % — HIGH (ref 0.1–0.3)
INR BLD: 1.08 RATIO — SIGNIFICANT CHANGE UP (ref 0.65–1.3)
INR BLD: 1.1 RATIO — SIGNIFICANT CHANGE UP (ref 0.65–1.3)
IRON SATN MFR SERPL: 12 % — LOW (ref 15–50)
IRON SATN MFR SERPL: 33 UG/DL — LOW (ref 35–150)
LACTATE BLDV-MCNC: 0.7 MMOL/L — SIGNIFICANT CHANGE UP (ref 0.5–2)
LDH SERPL L TO P-CCNC: 149 — SIGNIFICANT CHANGE UP (ref 50–242)
LIPID PNL WITH DIRECT LDL SERPL: 33 MG/DL — SIGNIFICANT CHANGE UP
LIPID PNL WITH DIRECT LDL SERPL: 36 MG/DL — SIGNIFICANT CHANGE UP
LYMPHOCYTES # BLD AUTO: 0.41 K/UL — LOW (ref 1.2–3.4)
LYMPHOCYTES # BLD AUTO: 0.43 K/UL — LOW (ref 1.2–3.4)
LYMPHOCYTES # BLD AUTO: 15 % — LOW (ref 20.5–51.1)
LYMPHOCYTES # BLD AUTO: 15.1 % — LOW (ref 20.5–51.1)
MAGNESIUM SERPL-MCNC: 1.9 MG/DL — SIGNIFICANT CHANGE UP (ref 1.8–2.4)
MAGNESIUM SERPL-MCNC: 2 MG/DL — SIGNIFICANT CHANGE UP (ref 1.8–2.4)
MCHC RBC-ENTMCNC: 28.8 PG — SIGNIFICANT CHANGE UP (ref 27–31)
MCHC RBC-ENTMCNC: 28.9 PG — SIGNIFICANT CHANGE UP (ref 27–31)
MCHC RBC-ENTMCNC: 29.4 G/DL — LOW (ref 32–37)
MCHC RBC-ENTMCNC: 29.8 G/DL — LOW (ref 32–37)
MCV RBC AUTO: 97 FL — SIGNIFICANT CHANGE UP (ref 81–99)
MCV RBC AUTO: 98.1 FL — SIGNIFICANT CHANGE UP (ref 81–99)
MONOCYTES # BLD AUTO: 0.31 K/UL — SIGNIFICANT CHANGE UP (ref 0.1–0.6)
MONOCYTES # BLD AUTO: 0.33 K/UL — SIGNIFICANT CHANGE UP (ref 0.1–0.6)
MONOCYTES NFR BLD AUTO: 11.4 % — HIGH (ref 1.7–9.3)
MONOCYTES NFR BLD AUTO: 11.5 % — HIGH (ref 1.7–9.3)
NEUTROPHILS # BLD AUTO: 1.81 K/UL — SIGNIFICANT CHANGE UP (ref 1.4–6.5)
NEUTROPHILS # BLD AUTO: 1.96 K/UL — SIGNIFICANT CHANGE UP (ref 1.4–6.5)
NEUTROPHILS NFR BLD AUTO: 66.8 % — SIGNIFICANT CHANGE UP (ref 42.2–75.2)
NEUTROPHILS NFR BLD AUTO: 68.7 % — SIGNIFICANT CHANGE UP (ref 42.2–75.2)
NON HDL CHOLESTEROL: 50 MG/DL — SIGNIFICANT CHANGE UP
NON HDL CHOLESTEROL: 52 MG/DL — SIGNIFICANT CHANGE UP
NRBC # BLD: 0 /100 WBCS — SIGNIFICANT CHANGE UP (ref 0–0)
NRBC # BLD: 0 /100 WBCS — SIGNIFICANT CHANGE UP (ref 0–0)
PCO2 BLDV: 53 MMHG — HIGH (ref 39–42)
PH BLDV: 7.27 — LOW (ref 7.32–7.43)
PHOSPHATE SERPL-MCNC: 3.3 MG/DL — SIGNIFICANT CHANGE UP (ref 2.1–4.9)
PLATELET # BLD AUTO: 101 K/UL — LOW (ref 130–400)
PLATELET # BLD AUTO: 99 K/UL — LOW (ref 130–400)
PO2 BLDV: 39 MMHG — SIGNIFICANT CHANGE UP
POTASSIUM BLDV-SCNC: 4.7 MMOL/L — SIGNIFICANT CHANGE UP (ref 3.5–5.1)
POTASSIUM SERPL-MCNC: 5.2 MMOL/L — HIGH (ref 3.5–5)
POTASSIUM SERPL-MCNC: 5.3 MMOL/L — HIGH (ref 3.5–5)
POTASSIUM SERPL-SCNC: 5.2 MMOL/L — HIGH (ref 3.5–5)
POTASSIUM SERPL-SCNC: 5.3 MMOL/L — HIGH (ref 3.5–5)
PROCALCITONIN SERPL-MCNC: <0.02 NG/ML — SIGNIFICANT CHANGE UP (ref 0.02–0.1)
PROT SERPL-MCNC: 5.3 G/DL — LOW (ref 6–8)
PROT SERPL-MCNC: 5.5 G/DL — LOW (ref 6–8)
PROTHROM AB SERPL-ACNC: 12.3 SEC — SIGNIFICANT CHANGE UP (ref 9.95–12.87)
PROTHROM AB SERPL-ACNC: 12.6 SEC — SIGNIFICANT CHANGE UP (ref 9.95–12.87)
PTH-INTACT FLD-MCNC: 128 PG/ML — HIGH (ref 15–65)
RBC # BLD: 2.57 M/UL — LOW (ref 4.2–5.4)
RBC # BLD: 2.63 M/UL — LOW (ref 4.2–5.4)
RBC # BLD: 2.63 M/UL — LOW (ref 4.2–5.4)
RBC # FLD: 18 % — HIGH (ref 11.5–14.5)
RBC # FLD: 18.2 % — HIGH (ref 11.5–14.5)
RETICS #: 140.7 K/UL — HIGH (ref 25–125)
RETICS/RBC NFR: 5.3 % — HIGH (ref 0.5–1.5)
SAO2 % BLDV: 68.3 % — SIGNIFICANT CHANGE UP
SODIUM SERPL-SCNC: 140 MMOL/L — SIGNIFICANT CHANGE UP (ref 135–146)
SODIUM SERPL-SCNC: 140 MMOL/L — SIGNIFICANT CHANGE UP (ref 135–146)
TIBC SERPL-MCNC: 279 UG/DL — SIGNIFICANT CHANGE UP (ref 220–430)
TRIGL SERPL-MCNC: 78 MG/DL — SIGNIFICANT CHANGE UP
TRIGL SERPL-MCNC: 85 MG/DL — SIGNIFICANT CHANGE UP
UIBC SERPL-MCNC: 246 UG/DL — SIGNIFICANT CHANGE UP (ref 110–370)
VIT B12 SERPL-MCNC: 527 PG/ML — SIGNIFICANT CHANGE UP (ref 232–1245)
WBC # BLD: 2.71 K/UL — LOW (ref 4.8–10.8)
WBC # BLD: 2.86 K/UL — LOW (ref 4.8–10.8)
WBC # FLD AUTO: 2.71 K/UL — LOW (ref 4.8–10.8)
WBC # FLD AUTO: 2.86 K/UL — LOW (ref 4.8–10.8)

## 2023-03-22 PROCEDURE — 99233 SBSQ HOSP IP/OBS HIGH 50: CPT

## 2023-03-22 PROCEDURE — 71045 X-RAY EXAM CHEST 1 VIEW: CPT | Mod: 26

## 2023-03-22 PROCEDURE — 71250 CT THORAX DX C-: CPT | Mod: 26

## 2023-03-22 PROCEDURE — 99223 1ST HOSP IP/OBS HIGH 75: CPT

## 2023-03-22 PROCEDURE — 93306 TTE W/DOPPLER COMPLETE: CPT | Mod: 26

## 2023-03-22 RX ORDER — PREGABALIN 225 MG/1
1000 CAPSULE ORAL DAILY
Refills: 0 | Status: DISCONTINUED | OUTPATIENT
Start: 2023-03-22 | End: 2023-03-26

## 2023-03-22 RX ORDER — FUROSEMIDE 40 MG
60 TABLET ORAL DAILY
Refills: 0 | Status: DISCONTINUED | OUTPATIENT
Start: 2023-03-22 | End: 2023-03-22

## 2023-03-22 RX ORDER — PETROLATUM,WHITE
1 JELLY (GRAM) TOPICAL
Refills: 0 | Status: DISCONTINUED | OUTPATIENT
Start: 2023-03-22 | End: 2023-03-26

## 2023-03-22 RX ORDER — FUROSEMIDE 40 MG
60 TABLET ORAL
Refills: 0 | Status: DISCONTINUED | OUTPATIENT
Start: 2023-03-22 | End: 2023-03-26

## 2023-03-22 RX ORDER — AZTREONAM 2 G
VIAL (EA) INJECTION
Refills: 0 | Status: DISCONTINUED | OUTPATIENT
Start: 2023-03-22 | End: 2023-03-22

## 2023-03-22 RX ORDER — AZTREONAM 2 G
2000 VIAL (EA) INJECTION EVERY 12 HOURS
Refills: 0 | Status: DISCONTINUED | OUTPATIENT
Start: 2023-03-22 | End: 2023-03-22

## 2023-03-22 RX ORDER — AZTREONAM 2 G
2000 VIAL (EA) INJECTION ONCE
Refills: 0 | Status: COMPLETED | OUTPATIENT
Start: 2023-03-22 | End: 2023-03-22

## 2023-03-22 RX ADMIN — Medication 25 MILLIGRAM(S): at 17:46

## 2023-03-22 RX ADMIN — Medication 1 APPLICATION(S): at 06:19

## 2023-03-22 RX ADMIN — Medication 1 MILLIGRAM(S): at 12:24

## 2023-03-22 RX ADMIN — Medication 1 APPLICATION(S): at 17:04

## 2023-03-22 RX ADMIN — PANTOPRAZOLE SODIUM 40 MILLIGRAM(S): 20 TABLET, DELAYED RELEASE ORAL at 05:00

## 2023-03-22 RX ADMIN — Medication 25 MILLIGRAM(S): at 05:01

## 2023-03-22 RX ADMIN — Medication 100 MILLIGRAM(S): at 17:03

## 2023-03-22 RX ADMIN — Medication 100 MILLIGRAM(S): at 04:21

## 2023-03-22 RX ADMIN — PREGABALIN 1000 MICROGRAM(S): 225 CAPSULE ORAL at 12:24

## 2023-03-22 RX ADMIN — ERYTHROPOIETIN 30000 UNIT(S): 10000 INJECTION, SOLUTION INTRAVENOUS; SUBCUTANEOUS at 16:22

## 2023-03-22 RX ADMIN — Medication 20 MILLIGRAM(S): at 05:01

## 2023-03-22 RX ADMIN — Medication 60 MILLIGRAM(S): at 16:22

## 2023-03-22 RX ADMIN — POLYETHYLENE GLYCOL 3350 17 GRAM(S): 17 POWDER, FOR SOLUTION ORAL at 12:25

## 2023-03-22 RX ADMIN — PANTOPRAZOLE SODIUM 40 MILLIGRAM(S): 20 TABLET, DELAYED RELEASE ORAL at 17:03

## 2023-03-22 NOTE — PROGRESS NOTE ADULT - SUBJECTIVE AND OBJECTIVE BOX
LATRICIA ARREAGA  77y  [facility]-N ED Hold 002 A      Patient is a 77y old  Female who presents with a chief complaint of Cellulitis RLE (21 Mar 2023 21:43)      INTERVAL HPI/OVERNIGHT EVENTS:        REVIEW OF SYSTEMS:        FAMILY HISTORY:  FH: kidney disease (Father)      T(C): 36.6 (23 @ 07:43), Max: 36.6 (23 @ 16:20)  HR: 62 (23 @ 07:43) (62 - 74)  BP: 120/52 (23 @ 07:43) (117/63 - 149/65)  RR: 20 (23 @ 07:43) (20 - 20)  SpO2: 100% (23 @ 07:43) (98% - 100%)  Wt(kg): --Vital Signs Last 24 Hrs  T(C): 36.6 (22 Mar 2023 07:43), Max: 36.6 (21 Mar 2023 16:20)  T(F): 97.9 (22 Mar 2023 07:43), Max: 97.9 (22 Mar 2023 07:43)  HR: 62 (22 Mar 2023 07:43) (62 - 74)  BP: 120/52 (22 Mar 2023 07:43) (117/63 - 149/65)  BP(mean): --  RR: 20 (22 Mar 2023 07:43) (20 - 20)  SpO2: 100% (22 Mar 2023 07:43) (98% - 100%)    Parameters below as of 22 Mar 2023 07:43  Patient On (Oxygen Delivery Method): nasal cannula  O2 Flow (L/min): 2      PHYSICAL EXAM:  GENERAL: NAD, well-groomed, well-developed  HEAD:  Atraumatic, Normocephalic  EYES: EOMI, PERRLA, conjunctiva and sclera clear  ENMT: No tonsillar erythema, exudates, or enlargement; Moist mucous membranes, Good dentition, No lesions  NECK: Supple, No JVD, Normal thyroid  NERVOUS SYSTEM:  Alert & Oriented X3, Good concentration; Motor Strength 5/5 B/L upper and lower extremities; DTRs 2+ intact and symmetric  PULM: Clear to auscultation bilaterally  CARDIAC: Regular rate and rhythm; No murmurs, rubs, or gallops  GI: Soft, Nontender, Nondistended; Bowel sounds present  EXTREMITIES:  2+ Peripheral Pulses, No clubbing, cyanosis, or edema  LYMPH: No lymphadenopathy noted  SKIN: No rashes or lesions    Consultant(s) Notes Reviewed:  [x ] YES  [ ] NO  Care Discussed with Consultants/Other Providers [ x] YES  [ ] NO    LABS:                            7.6    2.86  )-----------( 99       ( 22 Mar 2023 02:50 )             25.5       140  |  110  |  30<H>  ----------------------------<  78  5.2<H>   |  25  |  2.2<H>    Ca    8.4      22 Mar 2023 02:50  Mg     2.0         TPro  5.5<L>  /  Alb  3.1<L>  /  TBili  1.0  /  DBili  x   /  AST  11  /  ALT  5   /  AlkPhos  94              acetaminophen     Tablet .. 650 milliGRAM(s) Oral every 6 hours PRN  aluminum hydroxide/magnesium hydroxide/simethicone Suspension 30 milliLiter(s) Oral every 4 hours PRN  aztreonam  IVPB      aztreonam  IVPB 2000 milliGRAM(s) IV Intermittent every 12 hours  enoxaparin Injectable 40 milliGRAM(s) SubCutaneous every 24 hours  epoetin raquel-epbx (RETACRIT) Injectable 70389 Unit(s) SubCutaneous every 7 days  folic acid 1 milliGRAM(s) Oral daily  levoFLOXacin IVPB      melatonin 3 milliGRAM(s) Oral at bedtime PRN  metoprolol tartrate 25 milliGRAM(s) Oral two times a day  ondansetron Injectable 4 milliGRAM(s) IV Push every 8 hours PRN  pantoprazole    Tablet 40 milliGRAM(s) Oral two times a day  petrolatum white Ointment 1 Application(s) Topical two times a day  polyethylene glycol 3350 17 Gram(s) Oral daily  senna 2 Tablet(s) Oral at bedtime  silver sulfADIAZINE 1% Cream 1 Application(s) Topical two times a day  torsemide 20 milliGRAM(s) Oral daily      77-year-old female w/ history of HTN, HLD, CKD4, Severe AS, mild PHTN, Pancytopenia 2/2 covid?, hx GI bleeding (from Duodenum and Gastric AVM capsule endoscopy 2022 and enteroscopy 10/14 found to have multiple 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 Patient reports left sided chest tightness and discomfort associated with dyspnea on minimal exertion and dry cough. Patient reports that she feels as though she may need a transfusion. Admits to R>L lower extremity edema associated with redness of the right lower leg. She was on Antibiotics (Bactrim?) + Torsemide 20 for ~ 8 days for cellulitis completed the coarse but still has some redness. Reports waxing and waning redness. Denies fever, chills, palpitations, nausea, vomiting, abdominal pain, diarrhea, dysuria.     1.SOB and left pleuritic chest pain due to Acute hypercapneic and hypoxic respiratory failure secondary to community acquire pneumonia and possible acute CHF. hx of    - Admit to medicine                  - ECG:              - CXR: Right lower lobe opacity + bilateral effusion   - AB.19, PCO 62. Repeat ABG:pending   - Start Bipap 15/5   - Cont levofloxacin d:1  - Blood cx:pending         -MRSA:pending          - Sputum cx:pending   - CRP:pending          - Procalcitonin:pending  - Start Lasix 60mg IV daily   - Echocardio:pending   - Swallowing evaluation:pending   - Pulmonary consult:pending   - PT eval:pending     2. Chronic kidney stage IV with intermittent hyperkalemia   - Low k diet  - Cont diuresis   - Might lokelma if it persist       3. Constipation  - COnt laxative    4. Pancytopenia seems chronic .hx borderline B12 w/ elevated MMA & homocysteine  - Observe  - Follow up with    * follows with Dr Gaston w/ frequent iron and PRBC transfusions      5.HTN/HLD/CKD4  - Patient develops bleeding on aspirin  - Planned valve replacement  - f/u a1c, lipid profile  - c/w metoprolol      6. hx GI bleeding . Duodenum and Gastric AVM capsule endoscopy 2022 and enteroscopy 10/14 .multiple AVMs in duodenum. gastric antral vascular ectasia  - high fiber diet  - consider c/s GI for endoscopy and colonoscopy (planned last visit, but patient wanted it to be done outpatient)  - Patient had appointment w/ Dr Paula tomorrow for risk stratification for endoscopy. Previous cardio note for endoscopy risk found on 10/12/22 on [place]  - PPI bid as per GI    7. Sciatica  - Tylenol prn    8. Uterine fibroid/T12 compression deformity/  hx of avascular necrosis of right femoral head (seen on ct scan from 2022)   - outpatient f/u with orthopedic     9.Stasis dermatitis  - Silvadine  - foot care  - TTE (last echo 2022)  - Venous duplex:  - last duplex on 2023 was -ve       LATRICIA ARREAGA  77y  [facility]-N ED Hold 002 A      Patient is a 77y old  Female who presents with a chief complaint of Cellulitis RLE (21 Mar 2023 21:43)      INTERVAL HPI/OVERNIGHT EVENTS:  patient seems better. she was off oxygen. talking in full sentences.   she spoke to the pulmonary dr before we saw her and she refused thoracentesis   no other events noted         FAMILY HISTORY:  FH: kidney disease (Father)      T(C): 36.6 (23 @ 07:43), Max: 36.6 (23 @ 16:20)  HR: 62 (23 @ 07:43) (62 - 74)  BP: 120/52 (23 @ 07:43) (117/63 - 149/65)  RR: 20 (23 @ 07:43) (20 - 20)  SpO2: 100% (23 @ 07:43) (98% - 100%)  Wt(kg): --Vital Signs Last 24 Hrs  T(C): 36.6 (22 Mar 2023 07:43), Max: 36.6 (21 Mar 2023 16:20)  T(F): 97.9 (22 Mar 2023 07:43), Max: 97.9 (22 Mar 2023 07:43)  HR: 62 (22 Mar 2023 07:43) (62 - 74)  BP: 120/52 (22 Mar 2023 07:43) (117/63 - 149/65)  BP(mean): --  RR: 20 (22 Mar 2023 07:43) (20 - 20)  SpO2: 100% (22 Mar 2023 07:43) (98% - 100%)    Parameters below as of 22 Mar 2023 07:43  Patient On (Oxygen Delivery Method): nasal cannula  O2 Flow (L/min): 2      PHYSICAL EXAM:  GENERAL: NAD, well-groomed, well-develope  NERVOUS SYSTEM:  Alert & Oriented X3  PULM: Poor airway entry   CARDIAC: Regular rate and rhythm  GI: Soft, Nontender, Nondistended; Bowel sounds present  EXTREMITIES: Chronic changes , right leg warm     Consultant(s) Notes Reviewed:  [x ] YES  [ ] NO  Care Discussed with Consultants/Other Providers [ x] YES  [ ] NO    LABS:                            7.6    2.86  )-----------( 99       ( 22 Mar 2023 02:50 )             25.5   03-    140  |  110  |  30<H>  ----------------------------<  78  5.2<H>   |  25  |  2.2<H>    Ca    8.4      22 Mar 2023 02:50  Mg     2.0     -    TPro  5.5<L>  /  Alb  3.1<L>  /  TBili  1.0  /  DBili  x   /  AST  11  /  ALT  5   /  AlkPhos  94  -            acetaminophen     Tablet .. 650 milliGRAM(s) Oral every 6 hours PRN  aluminum hydroxide/magnesium hydroxide/simethicone Suspension 30 milliLiter(s) Oral every 4 hours PRN  aztreonam  IVPB      aztreonam  IVPB 2000 milliGRAM(s) IV Intermittent every 12 hours  enoxaparin Injectable 40 milliGRAM(s) SubCutaneous every 24 hours  epoetin raquel-epbx (RETACRIT) Injectable 76759 Unit(s) SubCutaneous every 7 days  folic acid 1 milliGRAM(s) Oral daily  levoFLOXacin IVPB      melatonin 3 milliGRAM(s) Oral at bedtime PRN  metoprolol tartrate 25 milliGRAM(s) Oral two times a day  ondansetron Injectable 4 milliGRAM(s) IV Push every 8 hours PRN  pantoprazole    Tablet 40 milliGRAM(s) Oral two times a day  petrolatum white Ointment 1 Application(s) Topical two times a day  polyethylene glycol 3350 17 Gram(s) Oral daily  senna 2 Tablet(s) Oral at bedtime  silver sulfADIAZINE 1% Cream 1 Application(s) Topical two times a day  torsemide 20 milliGRAM(s) Oral daily      77-year-old female w/ history of HTN, HLD, CKD4, Severe AS, mild PHTN, Pancytopenia 2/2 covid?, hx GI bleeding (from Duodenum and Gastric AVM capsule endoscopy 2022 and enteroscopy 10/14 found to have multiple 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 Patient reports left sided chest tightness and discomfort associated with dyspnea on minimal exertion and dry cough. Patient reports that she feels as though she may need a transfusion. Admits to R>L lower extremity edema associated with redness of the right lower leg. She was on Antibiotics (Bactrim?) + Torsemide 20 for ~ 8 days for cellulitis completed the coarse but still has some redness. Reports waxing and waning redness. Denies fever, chills, palpitations, nausea, vomiting, abdominal pain, diarrhea, dysuria.     1.SOB and left pleuritic chest pain due to Acute hypercapneic and hypoxic respiratory failure secondary to community acquire pneumonia and possible acute CHF. hx of    - Admit to medicine                  - ECG:WNL               - CXR: Right lower lobe opacity + bilateral effusion   - AB.19, PCO 62. Repeat ABG still with mild acute hypercapneic resp failure improving. pt refused bipap   - Cont levofloxacin d:1  - Blood cx:pending         -MRSA:pending          - Sputum cx:pending   - CRP:pending          - Procalcitonin:pending  - Start Lasix 60mg IV bid  - Echocardio:pending   - Swallowing evaluation:pending   - CT chest wo contrast:  a) effusion with atelectasis   - Pulmonary consult appreciated   - PT eval:pending     2. Chronic kidney stage IV with intermittent hyperkalemia   - Low k diet  - Cont diuresis   - Might lokelma if it persist       3. Constipation  - COnt laxative    4. Pancytopenia seems chronic .hx borderline B12 w/ elevated MMA & homocysteine  - Observe  - Follow up with    * follows with Dr Gaston w/ frequent iron and PRBC transfusions      5.HTN/HLD/CKD4  - Patient develops bleeding on aspirin  - Planned valve replacement  - Hba1c:4.3  - Lipid profile  - c/w metoprolol      6. hx GI bleeding . Duodenum and Gastric AVM capsule endoscopy 2022 and enteroscopy 10/14 .multiple AVMs in duodenum. gastric antral vascular ectasia  - high fiber diet  - consider c/s GI for endoscopy and colonoscopy (planned last visit, but patient wanted it to be done outpatient)  - Patient had appointment w/ Dr Paula tomorrow for risk stratification for endoscopy. Previous cardio note for endoscopy risk found on 10/12/22 on [place]  - PPI bid as per GI    7. Sciatica  - Tylenol prn    8. Uterine fibroid/T12 compression deformity/  hx of avascular necrosis of right femoral head (seen on ct scan from 2022)   - outpatient f/u with orthopedic     9.Stasis dermatitis with recent right lower extremity cellulitis ( still warm)  - Start Doxycycline d:1   - Silvadine  - foot care  - TTE (last echo 2022)  - last duplex on 2023 was -ve. Venous duplex was repeated and it's nl

## 2023-03-22 NOTE — ED ADULT NURSE REASSESSMENT NOTE - NS ED NURSE REASSESS COMMENT FT1
Received pt from [facility].  Pt resting comfortably at this time.  A&Ox4.  No acute distress noted.  Cardiac monitoring in place.

## 2023-03-22 NOTE — ED ADULT NURSE REASSESSMENT NOTE - NS ED NURSE REASSESS COMMENT FT1
Pt. refused lab draws from phlebotomist. Port is not properly flushing to obtain samples. MD on call Levar notified.

## 2023-03-22 NOTE — SWALLOW BEDSIDE ASSESSMENT ADULT - SLP PERTINENT HISTORY OF CURRENT PROBLEM
Pt is a 78 y/o F w/ PMHx: HTN, HLD, CKD4, severe AS, HFpEF w/ mod pulm HTN, persistent pancytopenia thought to be from GIB, and hx GIB w/ multiple AVMs who was BIBEMS for eval of worsening SOB for the past x 2 weeks. SOB and left pleuritic chest pain d/t acute hypercapneic and hypoxic respiratory failure 2' CAP and possible acute CHF. CXR 3/21-> new L basilar opacity.

## 2023-03-22 NOTE — CONSULT NOTE ADULT - ASSESSMENT
IMPRESSION:    Probable community acquired pneumonia  Fluid overload / HFpEF  R LE cellulitis  Pancytopenia - transfusion dependent  CKD4  Severe AS 2/2 rheumatic fever  HO GI bleeding    RECOMMENDATIONS:    CXR reviewed. Has LLL opacification. POCUS revealed b/l pleural effusions. L > R. Anechoic and simple without any septations.  Needs diagnostic tap - patient is refusing. Counselled about risks and benefits.  Get CT chest non contrast to r/o any consolidation  Cover empirically for community acquired pneumonia and get procalcitonin, follow blood cultures, send sputum gram stain and culture, Urine streptococcal and Legionella Ag and send full set of rapid viral panel.  LE venous duplex  Diurese as tolerates.  Avoid volume overload  HOB at 45, aspiration precautions  DVT ppx  Ambulate as tolerates / PT / OT     IMPRESSION:    Probable community acquired pneumonia  Fluid overload / HFpEF  Bilateral effusions  R LE cellulitis  Pancytopenia - transfusion dependent  CKD4  Severe AS 2/2 rheumatic fever  HO GI bleeding    RECOMMENDATIONS:    CXR reviewed. POCUS revealed b/l pleural effusions. L > R. Anechoic and simple without any septations.  BNP elevated; increased markings on the CXR; Likely CXR findings in favor of edema. Afebrile.   CT chest non contrast ordered and pending.   Cover empirically for community acquired pneumonia and get procalcitonin, follow blood cultures, send sputum gram stain and culture, Urine streptococcal and Legionella Ag and send full set of rapid viral panel.  LE venous duplex.   Recommend diuresis: Lasix 40mg IV daily. Monitor kidney function.   Check echocardiogram and trend enzymes.   HOB at 45, aspiration precautions  DVT ppx  Ambulate as tolerates / PT / OT     IMPRESSION:    Probable community acquired pneumonia  Fluid overload / HFpEF  Bilateral effusions  R LE cellulitis  Pancytopenia - transfusion dependent  CKD4  Severe AS 2/2 rheumatic fever  HO GI bleeding    RECOMMENDATIONS:    CXR reviewed. POCUS done revealed b/l pleural effusions. L > R. Anechoic and simple without any septations.  BNP elevated; increased markings on the CXR; Likely CXR findings in favor of edema. Afebrile.   CT chest non contrast ordered and pending.   Cover empirically for community acquired pneumonia and get procalcitonin.   Duplex of the LEs.   Recommend diuresis: Lasix 40mg IV daily. Monitor kidney function.   CXR in AM after adequate diuresis and once fluid status is optimized.   Check echocardiogram and trend enzymes.   HOB at 45, aspiration precautions  DVT ppx  Ambulate as tolerates / PT / OT  Will follow      IMPRESSION:    Probable community acquired pneumonia  Fluid overload / HFpEF  Bilateral effusions  R LE cellulitis  Pancytopenia - transfusion dependent  CKD4  Severe AS 2/2 rheumatic fever  HO GI bleeding    RECOMMENDATIONS:    CXR reviewed. POCUS done revealed b/l pleural effusions. L > R. Anechoic and simple without any septations.  BNP elevated; increased markings on the CXR; Likely CXR findings in favor of edema. Afebrile.   CT chest non contrast with small bilateral effusions; possible left basilar consolidation.   Cover empirically for community acquired pneumonia and get procalcitonin.   Duplex of the LEs. Left lower extremity cellulitis on exam.   Recommend diuresis: Lasix 40mg IV daily. Monitor kidney function. Cardiology eval.   CXR in AM after adequate diuresis and once fluid status is optimized.   Check echocardiogram and trend enzymes.   HOB at 45, aspiration precautions.  DVT ppx.  Ambulate as tolerates / PT / OT.  Will follow.

## 2023-03-22 NOTE — ED ADULT NURSE REASSESSMENT NOTE - NS ED NURSE REASSESS COMMENT FT1
PT refused bipap pulm @ bedside educated pt on need for bipap - pt still refused. alert & oriented x4. No s/s respiratory distress noted, o2 sat 97% on room air

## 2023-03-22 NOTE — PHYSICAL THERAPY INITIAL EVALUATION ADULT - SPECIFY REASON(S)
pt has bed rest orders.  resident Vanessa contacted.  PT to follow
pt encountered in bed in NAD, +tele, +right chest port.  She declined any mobility or exercise at this time due to c/o fatigue.  Pt educated on importance of mobility  and PT intervention.

## 2023-03-23 LAB
ALBUMIN SERPL ELPH-MCNC: 3 G/DL — LOW (ref 3.5–5.2)
ALP SERPL-CCNC: 83 U/L — SIGNIFICANT CHANGE UP (ref 30–115)
ALT FLD-CCNC: <5 U/L — SIGNIFICANT CHANGE UP (ref 0–41)
ANION GAP SERPL CALC-SCNC: 9 MMOL/L — SIGNIFICANT CHANGE UP (ref 7–14)
AST SERPL-CCNC: 10 U/L — SIGNIFICANT CHANGE UP (ref 0–41)
BASOPHILS # BLD AUTO: 0.01 K/UL — SIGNIFICANT CHANGE UP (ref 0–0.2)
BASOPHILS NFR BLD AUTO: 0.3 % — SIGNIFICANT CHANGE UP (ref 0–1)
BILIRUB SERPL-MCNC: 1 MG/DL — SIGNIFICANT CHANGE UP (ref 0.2–1.2)
BUN SERPL-MCNC: 37 MG/DL — HIGH (ref 10–20)
CALCIUM SERPL-MCNC: 8.2 MG/DL — LOW (ref 8.4–10.5)
CALCIUM SERPL-MCNC: 8.3 MG/DL — LOW (ref 8.4–10.5)
CHLORIDE SERPL-SCNC: 108 MMOL/L — SIGNIFICANT CHANGE UP (ref 98–110)
CO2 SERPL-SCNC: 26 MMOL/L — SIGNIFICANT CHANGE UP (ref 17–32)
CREAT SERPL-MCNC: 3 MG/DL — HIGH (ref 0.7–1.5)
EGFR: 15 ML/MIN/1.73M2 — LOW
EOSINOPHIL # BLD AUTO: 0.14 K/UL — SIGNIFICANT CHANGE UP (ref 0–0.7)
EOSINOPHIL NFR BLD AUTO: 4.9 % — SIGNIFICANT CHANGE UP (ref 0–8)
GLUCOSE SERPL-MCNC: 81 MG/DL — SIGNIFICANT CHANGE UP (ref 70–99)
HCT VFR BLD CALC: 24.9 % — LOW (ref 37–47)
HGB BLD-MCNC: 7.3 G/DL — LOW (ref 12–16)
IMM GRANULOCYTES NFR BLD AUTO: 0.3 % — SIGNIFICANT CHANGE UP (ref 0.1–0.3)
LYMPHOCYTES # BLD AUTO: 0.54 K/UL — LOW (ref 1.2–3.4)
LYMPHOCYTES # BLD AUTO: 18.8 % — LOW (ref 20.5–51.1)
MAGNESIUM SERPL-MCNC: 1.7 MG/DL — LOW (ref 1.8–2.4)
MCHC RBC-ENTMCNC: 28.3 PG — SIGNIFICANT CHANGE UP (ref 27–31)
MCHC RBC-ENTMCNC: 29.3 G/DL — LOW (ref 32–37)
MCV RBC AUTO: 96.5 FL — SIGNIFICANT CHANGE UP (ref 81–99)
MONOCYTES # BLD AUTO: 0.38 K/UL — SIGNIFICANT CHANGE UP (ref 0.1–0.6)
MONOCYTES NFR BLD AUTO: 13.2 % — HIGH (ref 1.7–9.3)
NEUTROPHILS # BLD AUTO: 1.8 K/UL — SIGNIFICANT CHANGE UP (ref 1.4–6.5)
NEUTROPHILS NFR BLD AUTO: 62.5 % — SIGNIFICANT CHANGE UP (ref 42.2–75.2)
NRBC # BLD: 0 /100 WBCS — SIGNIFICANT CHANGE UP (ref 0–0)
PLATELET # BLD AUTO: 98 K/UL — LOW (ref 130–400)
POTASSIUM SERPL-MCNC: 4.8 MMOL/L — SIGNIFICANT CHANGE UP (ref 3.5–5)
POTASSIUM SERPL-SCNC: 4.8 MMOL/L — SIGNIFICANT CHANGE UP (ref 3.5–5)
PROT SERPL-MCNC: 5.3 G/DL — LOW (ref 6–8)
PTH-INTACT FLD-MCNC: 163 PG/ML — HIGH (ref 15–65)
RBC # BLD: 2.58 M/UL — LOW (ref 4.2–5.4)
RBC # FLD: 17.9 % — HIGH (ref 11.5–14.5)
SODIUM SERPL-SCNC: 143 MMOL/L — SIGNIFICANT CHANGE UP (ref 135–146)
VWF AG ACT/NOR PPP IA: 245 % — HIGH (ref 63–170)
VWF AG ACT/NOR PPP IA: 68 % — SIGNIFICANT CHANGE UP (ref 63–170)
WBC # BLD: 2.88 K/UL — LOW (ref 4.8–10.8)
WBC # FLD AUTO: 2.88 K/UL — LOW (ref 4.8–10.8)

## 2023-03-23 PROCEDURE — 99223 1ST HOSP IP/OBS HIGH 75: CPT

## 2023-03-23 PROCEDURE — 99233 SBSQ HOSP IP/OBS HIGH 50: CPT

## 2023-03-23 RX ORDER — DIPHENHYDRAMINE HCL 50 MG
25 CAPSULE ORAL ONCE
Refills: 0 | Status: COMPLETED | OUTPATIENT
Start: 2023-03-23 | End: 2023-03-24

## 2023-03-23 RX ORDER — LORATADINE 10 MG/1
10 TABLET ORAL ONCE
Refills: 0 | Status: COMPLETED | OUTPATIENT
Start: 2023-03-23 | End: 2023-03-23

## 2023-03-23 RX ADMIN — Medication 1 MILLIGRAM(S): at 13:40

## 2023-03-23 RX ADMIN — Medication 100 MILLIGRAM(S): at 18:09

## 2023-03-23 RX ADMIN — PANTOPRAZOLE SODIUM 40 MILLIGRAM(S): 20 TABLET, DELAYED RELEASE ORAL at 18:10

## 2023-03-23 RX ADMIN — Medication 60 MILLIGRAM(S): at 06:04

## 2023-03-23 RX ADMIN — Medication 60 MILLIGRAM(S): at 18:10

## 2023-03-23 RX ADMIN — Medication 1 APPLICATION(S): at 06:04

## 2023-03-23 RX ADMIN — Medication 1 APPLICATION(S): at 07:04

## 2023-03-23 RX ADMIN — PREGABALIN 1000 MICROGRAM(S): 225 CAPSULE ORAL at 13:40

## 2023-03-23 RX ADMIN — PANTOPRAZOLE SODIUM 40 MILLIGRAM(S): 20 TABLET, DELAYED RELEASE ORAL at 06:05

## 2023-03-23 RX ADMIN — Medication 100 MILLIGRAM(S): at 06:05

## 2023-03-23 RX ADMIN — LORATADINE 10 MILLIGRAM(S): 10 TABLET ORAL at 01:51

## 2023-03-23 RX ADMIN — Medication 25 MILLIGRAM(S): at 06:05

## 2023-03-23 NOTE — PROGRESS NOTE ADULT - SUBJECTIVE AND OBJECTIVE BOX
LATRICIA ARREAGA  77y  [facility] Hold 002 A      Patient is a 77y old  Female who presents with a chief complaint of Cellulitis RLE (21 Mar 2023 21:43)      INTERVAL HPI/OVERNIGHT EVENTS:  patient seems better. she was off oxygen. talking in full sentences.   she spoke to the pulmonary dr before we saw her and she refused thoracentesis   no other events noted         FAMILY HISTORY:  FH: kidney disease (Father)      T(C): 36.6 (23 @ 07:43), Max: 36.6 (23 @ 16:20)  HR: 62 (23 @ 07:43) (62 - 74)  BP: 120/52 (23 @ 07:43) (117/63 - 149/65)  RR: 20 (23 @ 07:43) (20 - 20)  SpO2: 100% (23 @ 07:43) (98% - 100%)  Wt(kg): --Vital Signs Last 24 Hrs  T(C): 36.6 (22 Mar 2023 07:43), Max: 36.6 (21 Mar 2023 16:20)  T(F): 97.9 (22 Mar 2023 07:43), Max: 97.9 (22 Mar 2023 07:43)  HR: 62 (22 Mar 2023 07:43) (62 - 74)  BP: 120/52 (22 Mar 2023 07:43) (117/63 - 149/65)  BP(mean): --  RR: 20 (22 Mar 2023 07:43) (20 - 20)  SpO2: 100% (22 Mar 2023 07:43) (98% - 100%)    Parameters below as of 22 Mar 2023 07:43  Patient On (Oxygen Delivery Method): nasal cannula  O2 Flow (L/min): 2      PHYSICAL EXAM:  GENERAL: NAD, well-groomed, well-develope  NERVOUS SYSTEM:  Alert & Oriented X3  PULM: Poor airway entry   CARDIAC: Regular rate and rhythm  GI: Soft, Nontender, Nondistended; Bowel sounds present  EXTREMITIES: Chronic changes , right leg warm     Consultant(s) Notes Reviewed:  [x ] YES  [ ] NO  Care Discussed with Consultants/Other Providers [ x] YES  [ ] NO    LABS:                            7.6    2.86  )-----------( 99       ( 22 Mar 2023 02:50 )             25.5   03-    140  |  110  |  30<H>  ----------------------------<  78  5.2<H>   |  25  |  2.2<H>    Ca    8.4      22 Mar 2023 02:50  Mg     2.0     -    TPro  5.5<L>  /  Alb  3.1<L>  /  TBili  1.0  /  DBili  x   /  AST  11  /  ALT  5   /  AlkPhos  94  -            acetaminophen     Tablet .. 650 milliGRAM(s) Oral every 6 hours PRN  aluminum hydroxide/magnesium hydroxide/simethicone Suspension 30 milliLiter(s) Oral every 4 hours PRN  aztreonam  IVPB      aztreonam  IVPB 2000 milliGRAM(s) IV Intermittent every 12 hours  enoxaparin Injectable 40 milliGRAM(s) SubCutaneous every 24 hours  epoetin raquel-epbx (RETACRIT) Injectable 76829 Unit(s) SubCutaneous every 7 days  folic acid 1 milliGRAM(s) Oral daily  levoFLOXacin IVPB      melatonin 3 milliGRAM(s) Oral at bedtime PRN  metoprolol tartrate 25 milliGRAM(s) Oral two times a day  ondansetron Injectable 4 milliGRAM(s) IV Push every 8 hours PRN  pantoprazole    Tablet 40 milliGRAM(s) Oral two times a day  petrolatum white Ointment 1 Application(s) Topical two times a day  polyethylene glycol 3350 17 Gram(s) Oral daily  senna 2 Tablet(s) Oral at bedtime  silver sulfADIAZINE 1% Cream 1 Application(s) Topical two times a day  torsemide 20 milliGRAM(s) Oral daily      77-year-old female w/ history of HTN, HLD, CKD4, Severe AS, mild PHTN, Pancytopenia 2/2 covid?, hx GI bleeding (from Duodenum and Gastric AVM capsule endoscopy 2022 and enteroscopy 10/14 found to have multiple 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 Patient reports left sided chest tightness and discomfort associated with dyspnea on minimal exertion and dry cough. Patient reports that she feels as though she may need a transfusion. Admits to R>L lower extremity edema associated with redness of the right lower leg. She was on Antibiotics (Bactrim?) + Torsemide 20 for ~ 8 days for cellulitis completed the coarse but still has some redness. Reports waxing and waning redness. Denies fever, chills, palpitations, nausea, vomiting, abdominal pain, diarrhea, dysuria.     1.SOB and left pleuritic chest pain due to Acute hypercapneic and hypoxic respiratory failure secondary to  acute CHF.   - Admit to medicine                  - ECG:WNL               - CXR: Right lower lobe opacity + bilateral effusion   - AB.19, PCO 62. Repeat ABG still with mild acute hypercapneic resp failure improving. pt refused bipap   - Was on levofloxacin that will be stopped as procalcitonin is <0.02 and crp slightly elevated   - Blood cx:pending         -MRSA:pending          - Sputum cx:pending   - Cont Lasix 60mg IV bid  - Echocardio:pending   - Swallowing evaluation:pending   - CT chest wo contrast:  a) effusion with atelectasis   - Pulmonary consult appreciated   - PT eval:pending     2. Chronic kidney stage IV with intermittent hyperkalemia   - Low k diet  - Cont diuresis   - Might lokelma if it persist       3. Constipation  - Cont laxative    4. Pancytopenia seems chronic .hx borderline B12 w/ elevated MMA & homocysteine  - Observe  - Follow up with    * follows with Dr Gaston w/ frequent iron and PRBC transfusions      5.HTN/HLD/CKD4  - Patient develops bleeding on aspirin  - Planned valve replacement  - Hba1c:4.3  - Lipid profile  - c/w metoprolol      6. hx GI bleeding . Duodenum and Gastric AVM capsule endoscopy 2022 and enteroscopy 10/14 .multiple AVMs in duodenum. gastric antral vascular ectasia  - high fiber diet  - consider c/s GI for endoscopy and colonoscopy (planned last visit, but patient wanted it to be done outpatient)  - Patient had appointment w/ Dr Paula tomorrow for risk stratification for endoscopy. Previous cardio note for endoscopy risk found on 10/12/22 on [place]  - PPI bid as per GI    7. Sciatica  - Tylenol prn    8. Uterine fibroid/T12 compression deformity/  hx of avascular necrosis of right femoral head (seen on ct scan from 2022)   - outpatient f/u with orthopedic     9.Stasis dermatitis with recent right lower extremity cellulitis ( still warm)  - Cont Doxycycline d:2  - Silvadine  - foot care  - TTE (last echo 2022)  - last duplex on 2023 was -ve. Venous duplex was repeated and it's nl        LATRICIA ARREAGA  77y  [facility] ED Hold 002 A      Patient is a 77y old  Female who presents with a chief complaint of Cellulitis RLE (21 Mar 2023 21:43)      INTERVAL HPI/OVERNIGHT EVENTS:  patient seems better. she was off oxygen. talking in full sentences.   she spoke to the pulmonary dr before we saw her and she refused thoracentesis   no other events noted         FAMILY HISTORY:  FH: kidney disease (Father)      T(C): 36.6 (23 @ 07:43), Max: 36.6 (23 @ 16:20)  HR: 62 (23 @ 07:43) (62 - 74)  BP: 120/52 (23 @ 07:43) (117/63 - 149/65)  RR: 20 (23 @ 07:43) (20 - 20)  SpO2: 100% (23 @ 07:43) (98% - 100%)  Wt(kg): --Vital Signs Last 24 Hrs  T(C): 36.6 (22 Mar 2023 07:43), Max: 36.6 (21 Mar 2023 16:20)  T(F): 97.9 (22 Mar 2023 07:43), Max: 97.9 (22 Mar 2023 07:43)  HR: 62 (22 Mar 2023 07:43) (62 - 74)  BP: 120/52 (22 Mar 2023 07:43) (117/63 - 149/65)  BP(mean): --  RR: 20 (22 Mar 2023 07:43) (20 - 20)  SpO2: 100% (22 Mar 2023 07:43) (98% - 100%)    Parameters below as of 22 Mar 2023 07:43  Patient On (Oxygen Delivery Method): nasal cannula  O2 Flow (L/min): 2      PHYSICAL EXAM:  GENERAL: NAD, well-groomed, well-develope  NERVOUS SYSTEM:  Alert & Oriented X3  PULM: Poor airway entry   CARDIAC: Regular rate and rhythm  GI: Soft, Nontender, Nondistended; Bowel sounds present  EXTREMITIES: Chronic changes , right leg warm     Consultant(s) Notes Reviewed:  [x ] YES  [ ] NO  Care Discussed with Consultants/Other Providers [ x] YES  [ ] NO    LABS:                            7.6    2.86  )-----------( 99       ( 22 Mar 2023 02:50 )             25.5   03-    140  |  110  |  30<H>  ----------------------------<  78  5.2<H>   |  25  |  2.2<H>    Ca    8.4      22 Mar 2023 02:50  Mg     2.0     -    TPro  5.5<L>  /  Alb  3.1<L>  /  TBili  1.0  /  DBili  x   /  AST  11  /  ALT  5   /  AlkPhos  94  -            acetaminophen     Tablet .. 650 milliGRAM(s) Oral every 6 hours PRN  aluminum hydroxide/magnesium hydroxide/simethicone Suspension 30 milliLiter(s) Oral every 4 hours PRN  aztreonam  IVPB      aztreonam  IVPB 2000 milliGRAM(s) IV Intermittent every 12 hours  enoxaparin Injectable 40 milliGRAM(s) SubCutaneous every 24 hours  epoetin raquel-epbx (RETACRIT) Injectable 21769 Unit(s) SubCutaneous every 7 days  folic acid 1 milliGRAM(s) Oral daily  levoFLOXacin IVPB      melatonin 3 milliGRAM(s) Oral at bedtime PRN  metoprolol tartrate 25 milliGRAM(s) Oral two times a day  ondansetron Injectable 4 milliGRAM(s) IV Push every 8 hours PRN  pantoprazole    Tablet 40 milliGRAM(s) Oral two times a day  petrolatum white Ointment 1 Application(s) Topical two times a day  polyethylene glycol 3350 17 Gram(s) Oral daily  senna 2 Tablet(s) Oral at bedtime  silver sulfADIAZINE 1% Cream 1 Application(s) Topical two times a day  torsemide 20 milliGRAM(s) Oral daily      77-year-old female w/ history of HTN, HLD, CKD4, Severe AS, mild PHTN, Pancytopenia 2/2 covid?, hx GI bleeding (from Duodenum and Gastric AVM capsule endoscopy 2022 and enteroscopy 10/14 found to have multiple 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 Patient reports left sided chest tightness and discomfort associated with dyspnea on minimal exertion and dry cough. Patient reports that she feels as though she may need a transfusion. Admits to R>L lower extremity edema associated with redness of the right lower leg. She was on Antibiotics (Bactrim?) + Torsemide 20 for ~ 8 days for cellulitis completed the coarse but still has some redness. Reports waxing and waning redness. Denies fever, chills, palpitations, nausea, vomiting, abdominal pain, diarrhea, dysuria.     1.SOB and left pleuritic chest pain due to Acute hypercapneic and hypoxic respiratory failure secondary to  acute diastolic CHF associated with severe aortic stenosis   - Admit to medicine                  - ECG:WNL               - CXR: Right lower lobe opacity + bilateral effusion   - AB.19, PCO 62. Repeat ABG still with mild acute hypercapneic resp failure improving. pt refused bipap   - Was on levofloxacin that will be stopped as procalcitonin is <0.02 and crp slightly elevated   - Blood cx:pending         -MRSA:pending          - Sputum cx:pending   - Cont Lasix 60mg IV bid  * pt takes torsemide 20mg daily   - Echocardio:EF:62%, Severe aortic stenosis   - Swallowing evaluation:pending   - CT chest wo contrast:  a) effusion with atelectasis   - Pulmonary consult appreciated   - Cardio consult:pending  - PT eval:pending     2. Chronic kidney stage IV with intermittent hyperkalemia   - Low k diet  - Cont diuresis   - Might lokelma if it persist       3. Constipation  - Cont laxative    4. Pancytopenia seems chronic .hx borderline B12 w/ elevated MMA & homocysteine  - Observe  - Follow up with    * follows with Dr Gaston w/ frequent iron and PRBC transfusions      5.HTN/HLD/CKD4  - Patient develops bleeding on aspirin  - Planned valve replacement  - Hba1c:4.3  - Lipid profile  - c/w metoprolol      6. hx GI bleeding . Duodenum and Gastric AVM capsule endoscopy 2022 and enteroscopy 10/14 .multiple AVMs in duodenum. gastric antral vascular ectasia  - high fiber diet  - consider c/s GI for endoscopy and colonoscopy (planned last visit, but patient wanted it to be done outpatient)  - Patient had appointment w/ Dr Paula tomorrow for risk stratification for endoscopy. Previous cardio note for endoscopy risk found on 10/12/22 on [place]  - PPI bid as per GI    7. Sciatica  - Tylenol prn    8. Uterine fibroid/T12 compression deformity/  hx of avascular necrosis of right femoral head (seen on ct scan from 2022)   - outpatient f/u with orthopedic     9.Stasis dermatitis with recent right lower extremity cellulitis ( still warm)  - Cont Doxycycline d:2  - Silvadine  - foot care  - TTE (last echo 2022)  - last duplex on 2023 was -ve. Venous duplex was repeated and it's nl        LATRICIA ARREAGA  77y  [facility] ED Hold 002 A      Patient is a 77y old  Female who presents with a chief complaint of Cellulitis RLE (21 Mar 2023 21:43)      INTERVAL HPI/OVERNIGHT EVENTS:  patient feels well. she required oxygen overnight. no overnight events.   discuss her severe aortic stenosis and she is agreeable with further evaluation by cardio.         FAMILY HISTORY:  FH: kidney disease (Father)      T(C): 36.6 (23 @ 07:43), Max: 36.6 (23 @ 16:20)  HR: 62 (23 @ 07:43) (62 - 74)  BP: 120/52 (23 @ 07:43) (117/63 - 149/65)  RR: 20 (23 @ 07:43) (20 - 20)  SpO2: 100% (23 @ 07:43) (98% - 100%)  Wt(kg): --Vital Signs Last 24 Hrs  T(C): 36.6 (22 Mar 2023 07:43), Max: 36.6 (21 Mar 2023 16:20)  T(F): 97.9 (22 Mar 2023 07:43), Max: 97.9 (22 Mar 2023 07:43)  HR: 62 (22 Mar 2023 07:43) (62 - 74)  BP: 120/52 (22 Mar 2023 07:43) (117/63 - 149/65)  BP(mean): --  RR: 20 (22 Mar 2023 07:43) (20 - 20)  SpO2: 100% (22 Mar 2023 07:43) (98% - 100%)    Parameters below as of 22 Mar 2023 07:43  Patient On (Oxygen Delivery Method): nasal cannula  O2 Flow (L/min): 2      PHYSICAL EXAM:  GENERAL: NAD, well-groomed, well-develope  NERVOUS SYSTEM:  Alert & Oriented X3  PULM: Poor airway entry   CARDIAC: Regular rate and rhythm  GI: Soft, Nontender, Nondistended; Bowel sounds present  EXTREMITIES: Chronic changes , right leg warm     Consultant(s) Notes Reviewed:  [x ] YES  [ ] NO  Care Discussed with Consultants/Other Providers [ x] YES  [ ] NO    LABS:                            7.6    2.86  )-----------( 99       ( 22 Mar 2023 02:50 )             25.5   -    140  |  110  |  30<H>  ----------------------------<  78  5.2<H>   |  25  |  2.2<H>    Ca    8.4      22 Mar 2023 02:50  Mg     2.0         TPro  5.5<L>  /  Alb  3.1<L>  /  TBili  1.0  /  DBili  x   /  AST  11  /  ALT  5   /  AlkPhos  94              acetaminophen     Tablet .. 650 milliGRAM(s) Oral every 6 hours PRN  aluminum hydroxide/magnesium hydroxide/simethicone Suspension 30 milliLiter(s) Oral every 4 hours PRN  aztreonam  IVPB      aztreonam  IVPB 2000 milliGRAM(s) IV Intermittent every 12 hours  enoxaparin Injectable 40 milliGRAM(s) SubCutaneous every 24 hours  epoetin raquel-epbx (RETACRIT) Injectable 86769 Unit(s) SubCutaneous every 7 days  folic acid 1 milliGRAM(s) Oral daily  levoFLOXacin IVPB      melatonin 3 milliGRAM(s) Oral at bedtime PRN  metoprolol tartrate 25 milliGRAM(s) Oral two times a day  ondansetron Injectable 4 milliGRAM(s) IV Push every 8 hours PRN  pantoprazole    Tablet 40 milliGRAM(s) Oral two times a day  petrolatum white Ointment 1 Application(s) Topical two times a day  polyethylene glycol 3350 17 Gram(s) Oral daily  senna 2 Tablet(s) Oral at bedtime  silver sulfADIAZINE 1% Cream 1 Application(s) Topical two times a day  torsemide 20 milliGRAM(s) Oral daily      77-year-old female w/ history of HTN, HLD, CKD4, Severe AS, mild PHTN, Pancytopenia 2/2 covid?, hx GI bleeding (from Duodenum and Gastric AVM capsule endoscopy 2022 and enteroscopy 10/14 found to have multiple 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 Patient reports left sided chest tightness and discomfort associated with dyspnea on minimal exertion and dry cough. Patient reports that she feels as though she may need a transfusion. Admits to R>L lower extremity edema associated with redness of the right lower leg. She was on Antibiotics (Bactrim?) + Torsemide 20 for ~ 8 days for cellulitis completed the coarse but still has some redness. Reports waxing and waning redness. Denies fever, chills, palpitations, nausea, vomiting, abdominal pain, diarrhea, dysuria.     1.SOB and left pleuritic chest pain due to Acute hypercapneic and hypoxic respiratory failure secondary to  acute diastolic CHF associated with severe aortic stenosis   - Admit to medicine                  - ECG:WNL               - CXR: Right lower lobe opacity + bilateral effusion   - AB.19, PCO 62. Repeat ABG still with mild acute hypercapneic resp failure improving. pt refused bipap   - Was on levofloxacin that will be stopped as procalcitonin is <0.02 and crp slightly elevated   - Blood cx:pending         -MRSA:pending          - Sputum cx:pending   - Cont Lasix 60mg IV bid  * pt takes torsemide 20mg daily   - Echocardio:EF:62%, Severe aortic stenosis   - Swallowing evaluation:pending   - CT chest wo contrast:  a) effusion with atelectasis   - Pulmonary consult appreciated   - Cardio consult:pending  - PT eval:pending     2. Chronic kidney stage IV with intermittent hyperkalemia   - Low k diet  - Cont diuresis   - Might lokelma if it persist       3. Constipation  - Cont laxative    4. Pancytopenia seems chronic .hx borderline B12 w/ elevated MMA & homocysteine  - Observe  - Follow up with    * follows with Dr Gaston w/ frequent iron and PRBC transfusions      5.HTN/HLD/CKD4  - Patient develops bleeding on aspirin  - Planned valve replacement  - Hba1c:4.3  - Lipid profile  - c/w metoprolol      6. hx GI bleeding . Duodenum and Gastric AVM capsule endoscopy 2022 and enteroscopy 10/14 .multiple AVMs in duodenum. gastric antral vascular ectasia  - high fiber diet  - consider c/s GI for endoscopy and colonoscopy (planned last visit, but patient wanted it to be done outpatient)  - Patient had appointment w/ Dr Paula tomorrow for risk stratification for endoscopy. Previous cardio note for endoscopy risk found on 10/12/22 on [place]  - PPI bid as per GI    7. Sciatica  - Tylenol prn    8. Uterine fibroid/T12 compression deformity/  hx of avascular necrosis of right femoral head (seen on ct scan from 2022)   - outpatient f/u with orthopedic     9.Stasis dermatitis with recent right lower extremity cellulitis ( still warm)  - Cont Doxycycline d:2  - Silvadine  - foot care  - TTE (last echo 2022)  - last duplex on 2023 was -ve. Venous duplex was repeated and it's nl

## 2023-03-23 NOTE — CONSULT NOTE ADULT - ASSESSMENT
77-year-old female w/ history of HTN, HLD, CKD4, Severe AS, Pancytopenia 2/2 covid?, hx GI bleeding (from Duodenum and Gastric AVM), hx DARRIN (follows with Dr Gaston) w/ frequent iron and PRBC transfusions, presents to the ED with shortness of breath and left pleuritic chest tightness. patient reported chest tightness mostly on exertion (while walking). she also reported inability to lie flat. patient follows with Dr Dawn    #HFpEF exacerbation  #severe AS  - SOB and chest tightness on exertion  - currently on 2LNC  - trop<0.01, pro-BNP 2369  - CT chest -> Bilateral pleural effusions with compressive atelectasis. Likely congestive in nature.  -  TTE 03/2023 -> EF 62%, G2DD, severe AS  - On lasix 60 mg IV BID  - no prior hx of cath as per patient 77-year-old female w/ history of HTN, HLD, CKD4, Severe AS, Pancytopenia 2/2 covid?, hx GI bleeding (from Duodenum and Gastric AVM), hx DARRIN (follows with Dr Gaston) w/ frequent iron and PRBC transfusions, presents to the ED with shortness of breath and left pleuritic chest tightness. patient reported chest tightness mostly on exertion (while walking). she also reported inability to lie flat. patient follows with Dr Dawn    #HFpEF exacerbation  #severe AS  - SOB and chest tightness on exertion  - currently on 2LNC  - trop<0.01, pro-BNP 2369  - CT chest -> Bilateral pleural effusions with compressive atelectasis. Likely congestive in nature.  -  TTE 03/2023 -> EF 62%, G2DD, severe AS  - On lasix 60 mg IV BID  - no prior hx of cath as per patient    Rec  - c/w diuresis  - accurate in out  - OP fu with Dr Dawn for further planning for AVR 77-year-old female w/ history of HTN, HLD, CKD4, Severe AS, Pancytopenia 2/2 covid?, hx GI bleeding (from Duodenum and Gastric AVM), hx DARRIN (follows with Dr Gaston) w/ frequent iron and PRBC transfusions, presents to the ED with shortness of breath and left pleuritic chest tightness. patient reported chest tightness mostly on exertion (while walking). she also reported inability to lie flat. patient follows with Dr Dawn    #HFpEF exacerbation  #severe AS  - SOB and chest tightness on exertion  - currently on 2LNC  - trop<0.01, pro-BNP 2369  - CT chest -> Bilateral pleural effusions with compressive atelectasis. Likely congestive in nature.  -  TTE 03/2023 -> EF 62%, G2DD, severe AS  - On lasix 60 mg IV BID  - no prior hx of cath as per patient    Rec  - c/w diuresis for now   - accurate in out  -Monitor kidney function and electrolytes   -once euvolemic switch to po lasix   -consider hematology eval for need of transfusion while in hosp  - OP fu with Dr Dawn

## 2023-03-24 ENCOUNTER — TRANSCRIPTION ENCOUNTER (OUTPATIENT)
Age: 78
End: 2023-03-24

## 2023-03-24 ENCOUNTER — APPOINTMENT (OUTPATIENT)
Dept: INFUSION THERAPY | Facility: CLINIC | Age: 78
End: 2023-03-24

## 2023-03-24 VITALS
RESPIRATION RATE: 19 BRPM | OXYGEN SATURATION: 94 % | HEART RATE: 71 BPM | TEMPERATURE: 98 F | DIASTOLIC BLOOD PRESSURE: 54 MMHG | SYSTOLIC BLOOD PRESSURE: 113 MMHG

## 2023-03-24 LAB
ANION GAP SERPL CALC-SCNC: 10 MMOL/L — SIGNIFICANT CHANGE UP (ref 7–14)
BASOPHILS # BLD AUTO: 0.01 K/UL — SIGNIFICANT CHANGE UP (ref 0–0.2)
BASOPHILS NFR BLD AUTO: 0.3 % — SIGNIFICANT CHANGE UP (ref 0–1)
BUN SERPL-MCNC: 45 MG/DL — HIGH (ref 10–20)
CALCIUM SERPL-MCNC: 8 MG/DL — LOW (ref 8.4–10.5)
CHLORIDE SERPL-SCNC: 105 MMOL/L — SIGNIFICANT CHANGE UP (ref 98–110)
CO2 SERPL-SCNC: 27 MMOL/L — SIGNIFICANT CHANGE UP (ref 17–32)
CREAT SERPL-MCNC: 3.3 MG/DL — HIGH (ref 0.7–1.5)
EGFR: 14 ML/MIN/1.73M2 — LOW
EOSINOPHIL # BLD AUTO: 0.22 K/UL — SIGNIFICANT CHANGE UP (ref 0–0.7)
EOSINOPHIL NFR BLD AUTO: 7.3 % — SIGNIFICANT CHANGE UP (ref 0–8)
FIBRINOGEN AG PPP IA-MCNC: 757 MG/DL — HIGH (ref 233–496)
GLUCOSE SERPL-MCNC: 118 MG/DL — HIGH (ref 70–99)
HCT VFR BLD CALC: 24.5 % — LOW (ref 37–47)
HCT VFR BLD CALC: 25.7 % — LOW (ref 37–47)
HGB BLD-MCNC: 7.1 G/DL — LOW (ref 12–16)
HGB BLD-MCNC: 7.9 G/DL — LOW (ref 12–16)
IMM GRANULOCYTES NFR BLD AUTO: 0 % — LOW (ref 0.1–0.3)
LYMPHOCYTES # BLD AUTO: 0.55 K/UL — LOW (ref 1.2–3.4)
LYMPHOCYTES # BLD AUTO: 18.2 % — LOW (ref 20.5–51.1)
MCHC RBC-ENTMCNC: 28.3 PG — SIGNIFICANT CHANGE UP (ref 27–31)
MCHC RBC-ENTMCNC: 28.6 PG — SIGNIFICANT CHANGE UP (ref 27–31)
MCHC RBC-ENTMCNC: 29 G/DL — LOW (ref 32–37)
MCHC RBC-ENTMCNC: 30.7 G/DL — LOW (ref 32–37)
MCV RBC AUTO: 93.1 FL — SIGNIFICANT CHANGE UP (ref 81–99)
MCV RBC AUTO: 97.6 FL — SIGNIFICANT CHANGE UP (ref 81–99)
MONOCYTES # BLD AUTO: 0.41 K/UL — SIGNIFICANT CHANGE UP (ref 0.1–0.6)
MONOCYTES NFR BLD AUTO: 13.5 % — HIGH (ref 1.7–9.3)
NEUTROPHILS # BLD AUTO: 1.84 K/UL — SIGNIFICANT CHANGE UP (ref 1.4–6.5)
NEUTROPHILS NFR BLD AUTO: 60.7 % — SIGNIFICANT CHANGE UP (ref 42.2–75.2)
NRBC # BLD: 0 /100 WBCS — SIGNIFICANT CHANGE UP (ref 0–0)
NRBC # BLD: 0 /100 WBCS — SIGNIFICANT CHANGE UP (ref 0–0)
PLATELET # BLD AUTO: 87 K/UL — LOW (ref 130–400)
PLATELET # BLD AUTO: 98 K/UL — LOW (ref 130–400)
POTASSIUM SERPL-MCNC: 4.4 MMOL/L — SIGNIFICANT CHANGE UP (ref 3.5–5)
POTASSIUM SERPL-SCNC: 4.4 MMOL/L — SIGNIFICANT CHANGE UP (ref 3.5–5)
RBC # BLD: 2.51 M/UL — LOW (ref 4.2–5.4)
RBC # BLD: 2.76 M/UL — LOW (ref 4.2–5.4)
RBC # FLD: 17.7 % — HIGH (ref 11.5–14.5)
RBC # FLD: 18.8 % — HIGH (ref 11.5–14.5)
SODIUM SERPL-SCNC: 142 MMOL/L — SIGNIFICANT CHANGE UP (ref 135–146)
WBC # BLD: 2.81 K/UL — LOW (ref 4.8–10.8)
WBC # BLD: 3.03 K/UL — LOW (ref 4.8–10.8)
WBC # FLD AUTO: 2.81 K/UL — LOW (ref 4.8–10.8)
WBC # FLD AUTO: 3.03 K/UL — LOW (ref 4.8–10.8)

## 2023-03-24 PROCEDURE — 99239 HOSP IP/OBS DSCHRG MGMT >30: CPT

## 2023-03-24 PROCEDURE — 71045 X-RAY EXAM CHEST 1 VIEW: CPT | Mod: 26

## 2023-03-24 RX ORDER — FUROSEMIDE 40 MG
20 TABLET ORAL ONCE
Refills: 0 | Status: COMPLETED | OUTPATIENT
Start: 2023-03-24 | End: 2023-03-24

## 2023-03-24 RX ORDER — FUROSEMIDE 40 MG
1 TABLET ORAL
Qty: 180 | Refills: 0 | DISCHARGE
Start: 2023-03-24 | End: 2023-04-22

## 2023-03-24 RX ORDER — FERROUS SULFATE 325(65) MG
1 TABLET ORAL
Qty: 30 | Refills: 0
Start: 2023-03-24 | End: 2023-04-22

## 2023-03-24 RX ORDER — FUROSEMIDE 40 MG
3 TABLET ORAL
Qty: 180 | Refills: 0
Start: 2023-03-24 | End: 2023-04-22

## 2023-03-24 RX ADMIN — Medication 100 MILLIGRAM(S): at 17:12

## 2023-03-24 RX ADMIN — Medication 25 MILLIGRAM(S): at 10:58

## 2023-03-24 RX ADMIN — Medication 1 APPLICATION(S): at 17:12

## 2023-03-24 RX ADMIN — Medication 1 APPLICATION(S): at 05:20

## 2023-03-24 RX ADMIN — Medication 25 MILLIGRAM(S): at 05:18

## 2023-03-24 RX ADMIN — PREGABALIN 1000 MICROGRAM(S): 225 CAPSULE ORAL at 11:01

## 2023-03-24 RX ADMIN — Medication 60 MILLIGRAM(S): at 05:19

## 2023-03-24 RX ADMIN — Medication 1 MILLIGRAM(S): at 11:01

## 2023-03-24 RX ADMIN — PANTOPRAZOLE SODIUM 40 MILLIGRAM(S): 20 TABLET, DELAYED RELEASE ORAL at 17:12

## 2023-03-24 RX ADMIN — PANTOPRAZOLE SODIUM 40 MILLIGRAM(S): 20 TABLET, DELAYED RELEASE ORAL at 05:18

## 2023-03-24 RX ADMIN — POLYETHYLENE GLYCOL 3350 17 GRAM(S): 17 POWDER, FOR SOLUTION ORAL at 11:01

## 2023-03-24 RX ADMIN — Medication 650 MILLIGRAM(S): at 10:58

## 2023-03-24 RX ADMIN — Medication 60 MILLIGRAM(S): at 12:24

## 2023-03-24 RX ADMIN — Medication 100 MILLIGRAM(S): at 05:19

## 2023-03-24 RX ADMIN — Medication 1 APPLICATION(S): at 05:19

## 2023-03-24 RX ADMIN — Medication 25 MILLIGRAM(S): at 17:12

## 2023-03-24 NOTE — CHART NOTE - NSCHARTNOTEFT_GEN_A_CORE
Spoke with the patient at bedside. She wants to be discharged today. Explained to the patient that she needs to be evaluated by nephrology team and further monitoring for her Hb level after the unit of transfusion.   Still the patient wants to leave AMA. Explained her the risk of doing so, including and not limited to death . Patient is Alert , Oriented x 4. Will follow with her again to see if she decides otherwise.

## 2023-03-24 NOTE — CONSULT NOTE ADULT - SUBJECTIVE AND OBJECTIVE BOX
Patient is a 77y old  Female who presents with a chief complaint of Cellulitis RLE (22 Mar 2023 08:42)      HPI:  77-year-old female w/ history of HTN, HLD, CKD4, Severe AS, mild PHTN, Pancytopenia 2/2 covid?, hx GI bleeding (from Duodenum and Gastric AVM capsule endoscopy 02/2022 and enteroscopy 10/14 found to have multiple AVMS in duodenum, gastric antral vascular ectasia, Mod diverticulosis), hx DARRIN (follows with Dr Gaston) w/ frequent iron and PRBC transfusions, presents to the ED with shortness of breath and left pleuritic chest pain. Patient reports left sided chest tightness and discomfort associated with dyspnea on minimal exertion and dry cough. Patient reports that she feels as though she may need a transfusion. Admits to R>L lower extremity edema associated with redness of the right lower leg. She was on Antibiotics (Bactrim?) + Torsemide 20 for ~ 8 days for cellulitis completed the coarse but still has some redness. Reports waxing and waning redness. Denies fever, chills, palpitations, nausea, vomiting, abdominal pain, diarrhea, dysuria.     s/p vanco  s/p Lasix 40 IVP x 1  s/p Insulin + D50    PMHx:Internal hemorrhoids, erosive gastritis, sciatica, uterine fibroid, T12 compression deformity, avascular necrosis of right femoral head, aortic & iliac calcifications  PSHx cholecystectomy, appendectomy    In ED, VS: T 36.3, /66, HR 64, RR 20, SpO2 100% on 2 L/min NC (21 Mar 2023 21:43)      PAST MEDICAL & SURGICAL HISTORY:  HTN (hypertension)      Heart murmur      Eczema      Anemia  iron infusions and PRBC transfusions      Aortic stenosis      Chronic kidney disease (CKD)      2019 novel coronavirus disease (COVID-19)  4/2020- REHAB admission      H/O appendicitis      H/O cholecystitis  s/p cholecystectomy          SOCIAL HX:   Smoking                         ETOH                            Other    FAMILY HISTORY:  FH: kidney disease (Father)    .  No cardiovascular or pulmonary family history     REVIEW OF SYSTEMS:    All ROS are negative exept per HPI       Allergies    aspirin (Rash; Other)  latex (Unknown)  penicillins (Rash; Urticaria; Hives; Blisters)    Intolerances          PHYSICAL EXAM  Vital Signs Last 24 Hrs  T(C): 36.6 (22 Mar 2023 07:43), Max: 36.6 (21 Mar 2023 16:20)  T(F): 97.9 (22 Mar 2023 07:43), Max: 97.9 (22 Mar 2023 07:43)  HR: 62 (22 Mar 2023 07:43) (62 - 74)  BP: 120/52 (22 Mar 2023 07:43) (117/63 - 149/65)  BP(mean): --  RR: 20 (22 Mar 2023 07:43) (20 - 20)  SpO2: 97% (22 Mar 2023 10:22) (97% - 100%)    Parameters below as of 22 Mar 2023 10:22  Patient On (Oxygen Delivery Method): room air        CONSTITUTIONAL:  In NAD    ENT:   Airway patent,     EYES:   Clear bilaterally,   pupils equal,   round and reactive to light.    CARDIAC:   Normal rate,   regular rhythm.    no edema    RESPIRATORY:   No wheezing   Normal chest expansion  Not tachypneic,  No use of accessory muscles    GASTROINTESTINAL:  Abdomen soft, non-tender,   No guarding,   Positive BS    MUSCULOSKELETAL:   No clubbing, cyanosis    NEUROLOGICAL:   Alert and oriented     SKIN:   Skin normal color for race,             LABS:                          7.4    2.71  )-----------( 101      ( 22 Mar 2023 09:19 )             25.2                                               03-22    140  |  110  |  30<H>  ----------------------------<  78  5.2<H>   |  25  |  2.2<H>    Ca    8.4      22 Mar 2023 02:50  Mg     2.0     03-22    TPro  5.5<L>  /  Alb  3.1<L>  /  TBili  1.0  /  DBili  x   /  AST  11  /  ALT  5   /  AlkPhos  94  03-22      PT/INR - ( 22 Mar 2023 02:50 )   PT: 12.30 sec;   INR: 1.08 ratio         PTT - ( 22 Mar 2023 02:50 )  PTT:29.6 sec                                           CARDIAC MARKERS ( 21 Mar 2023 14:52 )  x     / <0.01 ng/mL / x     / x     / x                                                LIVER FUNCTIONS - ( 22 Mar 2023 02:50 )  Alb: 3.1 g/dL / Pro: 5.5 g/dL / ALK PHOS: 94 U/L / ALT: 5 U/L / AST: 11 U/L / GGT: x                                                                                                MEDICATIONS  (STANDING):  cyanocobalamin 1000 MICROGram(s) Oral daily  enoxaparin Injectable 40 milliGRAM(s) SubCutaneous every 24 hours  epoetin raquel-epbx (RETACRIT) Injectable 54028 Unit(s) SubCutaneous every 7 days  folic acid 1 milliGRAM(s) Oral daily  furosemide   Injectable 60 milliGRAM(s) IV Push daily  levoFLOXacin IVPB      metoprolol tartrate 25 milliGRAM(s) Oral two times a day  pantoprazole    Tablet 40 milliGRAM(s) Oral two times a day  petrolatum white Ointment 1 Application(s) Topical two times a day  polyethylene glycol 3350 17 Gram(s) Oral daily  senna 2 Tablet(s) Oral at bedtime  silver sulfADIAZINE 1% Cream 1 Application(s) Topical two times a day  torsemide 20 milliGRAM(s) Oral daily    MEDICATIONS  (PRN):  acetaminophen     Tablet .. 650 milliGRAM(s) Oral every 6 hours PRN Temp greater or equal to 38C (100.4F), Mild Pain (1 - 3)  aluminum hydroxide/magnesium hydroxide/simethicone Suspension 30 milliLiter(s) Oral every 4 hours PRN Dyspepsia  melatonin 3 milliGRAM(s) Oral at bedtime PRN Insomnia  ondansetron Injectable 4 milliGRAM(s) IV Push every 8 hours PRN Nausea and/or Vomiting    
NEPHROLOGY CONSULTATION NOTE    77-year-old female w/ history of HTN, HLD, CKD4, Severe AS, mild PHTN, Pancytopenia 2/2 covid?, hx GI bleeding (from Duodenum and Gastric AVM capsule endoscopy 02/2022 and enteroscopy 10/14 found to have multiple 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 Found to be in HF exacerbation treated with diuretics with improvement.  Patient seen at bedside, feeling better.  saturating well on RA, no major complaints.    PAST MEDICAL & SURGICAL HISTORY:  HTN (hypertension)      Heart murmur      Eczema      Anemia  iron infusions and PRBC transfusions      Aortic stenosis      Chronic kidney disease (CKD)      2019 novel coronavirus disease (COVID-19)  4/2020- REHAB admission      H/O appendicitis      H/O cholecystitis  s/p cholecystectomy        Allergies:  aspirin (Rash; Other)  latex (Unknown)  penicillins (Rash; Urticaria; Hives; Blisters)    Home Medications Reviewed  Hospital Medications:   MEDICATIONS  (STANDING):  cyanocobalamin 1000 MICROGram(s) Oral daily  doxycycline monohydrate Capsule 100 milliGRAM(s) Oral every 12 hours  enoxaparin Injectable 40 milliGRAM(s) SubCutaneous every 24 hours  epoetin raquel-epbx (RETACRIT) Injectable 64478 Unit(s) SubCutaneous every 7 days  folic acid 1 milliGRAM(s) Oral daily  furosemide   Injectable 60 milliGRAM(s) IV Push two times a day  metoprolol tartrate 25 milliGRAM(s) Oral two times a day  pantoprazole    Tablet 40 milliGRAM(s) Oral two times a day  petrolatum white Ointment 1 Application(s) Topical two times a day  polyethylene glycol 3350 17 Gram(s) Oral daily  senna 2 Tablet(s) Oral at bedtime  silver sulfADIAZINE 1% Cream 1 Application(s) Topical two times a day    SOCIAL HISTORY:  Denies ETOH,Smoking,   FAMILY HISTORY:  FH: kidney disease (Father)        REVIEW OF SYSTEMS:  CONSTITUTIONAL: No weakness, fevers or chills  EYES/ENT: No visual changes;  No vertigo or throat pain   NECK: No pain or stiffness  RESPIRATORY: No cough, wheezing, hemoptysis; No shortness of breath  CARDIOVASCULAR: No chest pain or palpitations.  GASTROINTESTINAL: No abdominal or epigastric pain. No nausea, vomiting, or hematemesis; No diarrhea or constipation. No melena or hematochezia.  GENITOURINARY: No dysuria, frequency, foamy urine, urinary urgency, incontinence or hematuria  NEUROLOGICAL: No numbness or weakness  SKIN: No itching, burning, rashes, or lesions   VASCULAR: No bilateral lower extremity edema.   All other review of systems is negative unless indicated above.    VITALS:  Vital Signs Last 24 Hrs  T(C): 36.8 (24 Mar 2023 07:37), Max: 36.9 (24 Mar 2023 01:06)  T(F): 98.2 (24 Mar 2023 07:37), Max: 98.4 (24 Mar 2023 01:06)  HR: 69 (24 Mar 2023 10:18) (65 - 86)  BP: 114/54 (24 Mar 2023 10:18) (104/49 - 116/53)  BP(mean): 76 (24 Mar 2023 07:37) (76 - 76)  RR: 18 (24 Mar 2023 10:18) (18 - 18)  SpO2: 99% (24 Mar 2023 10:40) (97% - 100%)    Parameters below as of 24 Mar 2023 10:40  Patient On (Oxygen Delivery Method): room air        03-24 @ 07:01  -  03-24 @ 15:06  --------------------------------------------------------  IN: 0 mL / OUT: 300 mL / NET: -300 mL        PHYSICAL EXAM:  Constitutional: NAD  HEENT: anicteric sclera, oropharynx clear, MMM  Neck: No JVD  Respiratory: CTAB, no wheezes, rales or rhonchi  Cardiovascular: S1, S2, RRR  Gastrointestinal: BS+, soft, NT/ND  Extremities: No cyanosis or clubbing. No peripheral edema  Neurological: A/O x 3, no focal deficits  Psychiatric: Normal mood, normal affect  : No CVA tenderness. No culver.   Skin: No rashes    LABS:  03-24    142  |  105  |  45<H>  ----------------------------<  118<H>  4.4   |  27  |  3.3<H>    Ca    8.0<L>      24 Mar 2023 07:16  Mg     1.7     03-23    TPro  5.3<L>  /  Alb  3.0<L>  /  TBili  1.0  /  DBili      /  AST  10  /  ALT  <5  /  AlkPhos  83  03-23    Creatinine Trend: 3.3 <--, 3.0 <--, 2.3 <--, 2.2 <--, 1.9 <--, 2.7 <--, 2.7 <--                        7.1    2.81  )-----------( 87       ( 24 Mar 2023 07:16 )             24.5     Urine Studies:        03-22 @ 09:19  8.2  163  --  03-22 @ 02:50  --  128  --                        
Patient is a 77y old  Female who presents with a chief complaint of Cellulitis RLE (23 Mar 2023 08:01)      HPI:  77-year-old female w/ history of HTN, HLD, CKD4, Severe AS, mild PHTN, Pancytopenia 2/2 covid?, hx GI bleeding (from Duodenum and Gastric AVM capsule endoscopy 02/2022 and enteroscopy 10/14 found to have multiple AVMS in duodenum, gastric antral vascular ectasia, Mod diverticulosis), hx DARRIN (follows with Dr Gaston) w/ frequent iron and PRBC transfusions, presents to the ED with shortness of breath and left pleuritic chest pain. Patient reports left sided chest tightness and discomfort associated with dyspnea on minimal exertion and dry cough. Patient reports that she feels as though she may need a transfusion. Admits to R>L lower extremity edema associated with redness of the right lower leg. She was on Antibiotics (Bactrim?) + Torsemide 20 for ~ 8 days for cellulitis completed the coarse but still has some redness. Reports waxing and waning redness. Denies fever, chills, palpitations, nausea, vomiting, abdominal pain, diarrhea, dysuria.     s/p vanco  s/p Lasix 40 IVP x 1  s/p Insulin + D50    PMHx:Internal hemorrhoids, erosive gastritis, sciatica, uterine fibroid, T12 compression deformity, avascular necrosis of right femoral head, aortic & iliac calcifications  PSHx cholecystectomy, appendectomy    In ED, VS: T 36.3, /66, HR 64, RR 20, SpO2 100% on 2 L/min NC (21 Mar 2023 21:43)      PAST MEDICAL & SURGICAL HISTORY:  HTN (hypertension)      Heart murmur      Eczema      Anemia  iron infusions and PRBC transfusions      Aortic stenosis      Chronic kidney disease (CKD)      2019 novel coronavirus disease (COVID-19)  4/2020- REHAB admission      H/O appendicitis      H/O cholecystitis  s/p cholecystectomy                ECHO  FINDINGS:      MEDICATIONS  (STANDING):  cyanocobalamin 1000 MICROGram(s) Oral daily  doxycycline monohydrate Capsule 100 milliGRAM(s) Oral every 12 hours  enoxaparin Injectable 40 milliGRAM(s) SubCutaneous every 24 hours  epoetin raquel-epbx (RETACRIT) Injectable 48159 Unit(s) SubCutaneous every 7 days  folic acid 1 milliGRAM(s) Oral daily  furosemide   Injectable 60 milliGRAM(s) IV Push two times a day  metoprolol tartrate 25 milliGRAM(s) Oral two times a day  pantoprazole    Tablet 40 milliGRAM(s) Oral two times a day  petrolatum white Ointment 1 Application(s) Topical two times a day  polyethylene glycol 3350 17 Gram(s) Oral daily  senna 2 Tablet(s) Oral at bedtime  silver sulfADIAZINE 1% Cream 1 Application(s) Topical two times a day    MEDICATIONS  (PRN):  acetaminophen     Tablet .. 650 milliGRAM(s) Oral every 6 hours PRN Temp greater or equal to 38C (100.4F), Mild Pain (1 - 3)  aluminum hydroxide/magnesium hydroxide/simethicone Suspension 30 milliLiter(s) Oral every 4 hours PRN Dyspepsia  melatonin 3 milliGRAM(s) Oral at bedtime PRN Insomnia  ondansetron Injectable 4 milliGRAM(s) IV Push every 8 hours PRN Nausea and/or Vomiting      FAMILY HISTORY:  FH: kidney disease (Father)            REVIEW OF SYSTEMS  ROS is negative except as mentioned in HPI    Allergic/Immunologic:	  SOCIAL HISTORY:    CIGARETTES:    ALCOHOL:  Vital Signs Last 24 Hrs  T(C): 36.9 (23 Mar 2023 07:35), Max: 36.9 (23 Mar 2023 07:35)  T(F): 98.5 (23 Mar 2023 07:35), Max: 98.5 (23 Mar 2023 07:35)  HR: 66 (23 Mar 2023 10:17) (65 - 79)  BP: 102/56 (23 Mar 2023 10:17) (102/56 - 128/56)  BP(mean): 74 (22 Mar 2023 20:04) (74 - 81)  RR: 18 (23 Mar 2023 10:17) (18 - 19)  SpO2: 99% (23 Mar 2023 10:17) (93% - 100%)    Parameters below as of 23 Mar 2023 10:17  Patient On (Oxygen Delivery Method): nasal cannula        PHYSICAL EXAM:    GENERAL: NAD  HEAD:  Atraumatic, Normocephalic  EYES: EOMI, PERRLA, conjunctiva and sclera clear  ENT: Moist mucous membranes  NECK: Supple, No JVD  CHEST/LUNG: bilateral crackles to auscultation  HEART: Regular rate and rhythm; systolic murmur heard at aortic area  ABDOMEN: BSx4; Soft, nontender, nondistended  EXTREMITIES:   right lower extremity swelling (non pitting) associated with erythema and tenderness  NERVOUS SYSTEM:  A&Ox3, no focal deficits       ECG:    I&O's Detail      LABS:                        7.3    2.88  )-----------( 98       ( 23 Mar 2023 06:31 )             24.9     03-23    143  |  108  |  37<H>  ----------------------------<  81  4.8   |  26  |  3.0<H>    Ca    8.3<L>      23 Mar 2023 06:31  Phos  3.3     03-22  Mg     1.7     03-23    TPro  5.3<L>  /  Alb  3.0<L>  /  TBili  1.0  /  DBili  x   /  AST  10  /  ALT  <5  /  AlkPhos  83  03-23    CARDIAC MARKERS ( 21 Mar 2023 14:52 )  x     / <0.01 ng/mL / x     / x     / x          PT/INR - ( 22 Mar 2023 09:19 )   PT: 12.60 sec;   INR: 1.10 ratio         PTT - ( 22 Mar 2023 09:19 )  PTT:32.8 sec    I&O's Summary    BNP  RADIOLOGY & ADDITIONAL STUDIES:

## 2023-03-24 NOTE — PHYSICAL THERAPY INITIAL EVALUATION ADULT - PERTINENT HX OF CURRENT PROBLEM, REHAB EVAL
77-year-old female w/ history of HTN, HLD, CKD4, Severe AS, mild PHTN, Pancytopenia 2/2 covid?, hx GI bleeding (from Duodenum and Gastric AVM capsule endoscopy 02/2022 and enteroscopy 10/14 found to have multiple 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 Patient reports left sided chest tightness and discomfort associated with dyspnea on minimal exertion and dry cough. Patient reports that she feels as though she may need a transfusion. Admits to R>L lower extremity edema associated with redness of the right lower leg. She was on Antibiotics (Bactrim?) + Torsemide 20 for ~ 8 days for cellulitis completed the coarse but still has some redness. Reports waxing and waning redness. Denies fever, chills, palpitations, nausea, vomiting, abdominal pain, diarrhea, dysuria
see below

## 2023-03-24 NOTE — DISCHARGE NOTE PROVIDER - NSDCFUADDINST_GEN_ALL_CORE_FT
Please follow up with your PCP.  Please follow up with Nephrologist.  Please follow up with cardiologist.  Please follow up with vascular surgery.

## 2023-03-24 NOTE — CONSULT NOTE ADULT - ATTENDING COMMENTS
77-year-old female w/ history of HTN, HLD, CKD4, Severe AS, mild PHTN, Pancytopenia 2/2 covid?, hx GI bleeding (from Duodenum and Gastric AVM capsule endoscopy 02/2022 and enteroscopy 10/14 found to have multiple 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 Found to be in HF exacerbation treated with diuretics with improvement.    # CKD 4  # AS/ HTN    Creat noted on admission below baseline  back to baseline now   no further renal work up   switch to lasix po  will need OP renal follow up on DC
IMPRESSION:    Probable community acquired pneumonia  Fluid overload / HFpEF  Bilateral effusions  R LE cellulitis  Pancytopenia - transfusion dependent  CKD4  Severe AS 2/2 rheumatic fever  HO GI bleeding    Impression and plan are my own.
Agree with above   Plan as outlined above (note edited)

## 2023-03-24 NOTE — DISCHARGE NOTE PROVIDER - NSDCFUSCHEDAPPT_GEN_ALL_CORE_FT
Liu Gaston  [facility] PreAdmits  Scheduled Appointment: 03/31/2023    [facility]  AMBCHEMO SI [address]  Scheduled Appointment: 03/31/2023    [facility]  AMBCHEMO SI [address]  Scheduled Appointment: 04/07/2023    Liu Gaston  [facility] PreAdmits  Scheduled Appointment: 04/07/2023    Liu Gaston  [facility] PreAdmits  Scheduled Appointment: 04/13/2023    Liu Gaston  [facility] Physician [facility]  HEMONC SI [address]  Scheduled Appointment: 04/13/2023    David Eisenberg  [facility] Physician [facility]  OTOLARYNG [address]  Scheduled Appointment: 05/04/2023

## 2023-03-24 NOTE — DISCHARGE NOTE PROVIDER - PROVIDER TOKENS
PROVIDER:[TOKEN:[[id number]],FOLLOWUP:[2 weeks],ESTABLISHEDPATIENT:[T]],PROVIDER:[TOKEN:[[id number]],FOLLOWUP:[1 week]],PROVIDER:[TOKEN:[[id number]],FOLLOWUP:[2 weeks],ESTABLISHEDPATIENT:[T]],PROVIDER:[TOKEN:[[id number]],FOLLOWUP:[1 month]],PROVIDER:[TOKEN:[[id number]],FOLLOWUP:[1 month]] PROVIDER:[TOKEN:[[id number]],FOLLOWUP:[2 weeks],ESTABLISHEDPATIENT:[T]],PROVIDER:[TOKEN:[[id number]],FOLLOWUP:[1 week]],PROVIDER:[TOKEN:[[id number]],FOLLOWUP:[2 weeks],ESTABLISHEDPATIENT:[T]],PROVIDER:[TOKEN:[[id number]],FOLLOWUP:[1 month]],PROVIDER:[TOKEN:[[id number]],FOLLOWUP:[1 month]],PROVIDER:[TOKEN:[[id number]],FOLLOWUP:[1 week]]

## 2023-03-24 NOTE — DISCHARGE NOTE PROVIDER - CARE PROVIDERS DIRECT ADDRESSES
,[email or web address]

## 2023-03-24 NOTE — CONSULT NOTE ADULT - ASSESSMENT
77-year-old female w/ history of HTN, HLD, CKD4, Severe AS, mild PHTN, Pancytopenia 2/2 covid?, hx GI bleeding (from Duodenum and Gastric AVM capsule endoscopy 02/2022 and enteroscopy 10/14 found to have multiple 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 Found to be in HF exacerbation treated with diuretics with improvement.    Assessment and plan  CKD 4/ HF exacerbation/ Severe AS  baseline creatinine 2.7, 1,9 on admission due to volume overload  s/p iv diuretics with improvement   on RA  switch IV lasix to po 60 bid  Hgb noted, Low TSAT and ferritin  start po iron   Check P and PTH levels  f/u with nephrology as OP  will follow

## 2023-03-24 NOTE — DISCHARGE NOTE NURSING/CASE MANAGEMENT/SOCIAL WORK - PATIENT PORTAL LINK FT
You can access the FollowMyHealth Patient Portal offered by [facility] by registering at the following website: [email or web address]. By joining [employer]’s FollowMyHealth portal, you will also be able to view your health information using other applications (apps) compatible with our system.

## 2023-03-24 NOTE — DISCHARGE NOTE NURSING/CASE MANAGEMENT/SOCIAL WORK - NSDCPEFALRISK_GEN_ALL_CORE
For information on Fall & Injury Prevention, visit: [email or web address] OR  [email or web address] OR  https://www.cdc.gov/steadi/patient.html

## 2023-03-24 NOTE — DISCHARGE NOTE PROVIDER - NPI NUMBER (FOR SYSADMIN USE ONLY) :
[[id number]],[[id number]],[[id number]],[[id number]] [[id number]],[[id number]],[[id number]],[[id number]]

## 2023-03-24 NOTE — PHYSICAL THERAPY INITIAL EVALUATION ADULT - GENERAL OBSERVATIONS, REHAB EVAL
pt seen 11:20-11:35 for pt education/importance of mobility to prevent further exacerbation of her condition
PT IE 2:30-3pm. Patient left in supine in NAD. +call bell, +bed alarm, +telemetry.

## 2023-03-24 NOTE — PHYSICAL THERAPY INITIAL EVALUATION ADULT - NSPTDISCHREC_GEN_A_CORE
Sub-acute Rehab versus Home PT depending on improvement of endurance and availability of supervision at home. Patient reported she has her son to supervise her and assist with all physical tasks at home.

## 2023-03-24 NOTE — DISCHARGE NOTE PROVIDER - NSDCCPCAREPLAN_GEN_ALL_CORE_FT
PRINCIPAL DISCHARGE DIAGNOSIS  Diagnosis: Dyspnea  Assessment and Plan of Treatment: Shortness of breath  Shortness of breath (dyspnea) means you have trouble breathing and could indicate a medical problem. Causes include lung disease, heart disease, low amount of red blood cells (anemia), poor physical fitness, being overweight, smoking, etc. Your health care provider today may not be able to find a cause for your shortness of breath after your exam. In this case, it is important to have a follow-up exam with your primary care physician as instructed. If medicines were prescribed, take them as directed for the full length of time directed. Refrain from tobacco products.  SEEK IMMEDIATE MEDICAL CARE IF YOU HAVE ANY OF THE FOLLOWING SYMPTOMS: worsening shortness of breath, chest pain, back pain, abdominal pain, fever, coughing up blood, lightheadedness/dizziness.      SECONDARY DISCHARGE DIAGNOSES  Diagnosis: Fluid overload  Assessment and Plan of Treatment:     Diagnosis: Cellulitis of right leg  Assessment and Plan of Treatment: Take the prescribed antibiotic medicine you are given as directed until it is gone. Take it even if you feel better. It treats the infection and stops it from  Not taking all the medicine can make future infections hard to treat. Keep the infected area clean. When possible, raise the infected area above the level of your heart. This helps keep swelling down. Apply clean bandages as advised. Wash your hands often to prevent spreading the infection. In the future, wash your hands before and after you touch cuts, scratches, or bandages. This will help prevent infection.   Call your doctor or returnt o the emergency room or call 911 immediately if you have any of the following: Difficulty or pain when moving the joints above or below the infected area, Discharge or pus draining from the area, rever of 100.4°F (38°C) or higher, or as directed by your healthcare provider, pain that gets worse in or around the infected site, redness that gets worse in or around the infected area, particularly if the area of redness expands to a wider area, shaking chills, swelling of the infected area, vomiting.    Diagnosis: Pancytopenia  Assessment and Plan of Treatment:     Diagnosis: Chronic GI bleeding  Assessment and Plan of Treatment:     Diagnosis: Hyperkalemia  Assessment and Plan of Treatment:

## 2023-03-24 NOTE — DISCHARGE NOTE PROVIDER - NSDCMRMEDTOKEN_GEN_ALL_CORE_FT
Benadryl 25 mg oral capsule: 1 cap(s) orally once a day (at bedtime), As Needed  ferrous sulfate 200 mg (65 mg elemental iron) oral tablet: 1 tab(s) orally once a day   folic acid 1 mg oral tablet: 1 tab(s) orally once a day  furosemide 20 mg oral tablet: 3 tab(s) orally 2 times a day   metoprolol tartrate 25 mg oral tablet: 1 tab(s) orally 2 times a day  MiraLax oral powder for reconstitution: 17 gram(s) orally once a day  pantoprazole 40 mg oral delayed release tablet: 1 tab(s) orally 2 times a day  Retacrit 20,000 units/mL injectable solution: 99068 unit(s) injectable once a week

## 2023-03-24 NOTE — DISCHARGE NOTE PROVIDER - HOSPITAL COURSE
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 Patient reports left sided chest tightness and discomfort associated with dyspnea on minimal exertion and dry cough. Patient reports that she feels as though she may need a transfusion. Admits to R>L lower extremity edema associated with redness of the right lower leg. She was on Antibiotics (Bactrim?) + Torsemide 20 for ~ 8 days for cellulitis completed the coarse but still has some redness. Reports waxing and waning redness. Denies fever, chills, palpitations, nausea, vomiting, abdominal pain, diarrhea, dysuria.     s/p vanco  s/p Lasix 40 IVP x 1  s/p Insulin + D50    PMHx:Internal hemorrhoids, erosive gastritis, sciatica, uterine fibroid, T12 compression deformity, avascular necrosis of right femoral head, aortic & iliac calcifications  PSHx cholecystectomy, appendectomy    In ED, VS: T 36.3, /66, HR 64, RR 20, SpO2 100% on 2 L/min NC.    Cardiology was consulted and recommended to c/w diuresis, accurate in out, monitor kidney function and electrolytes   -once euvolemic switch to po lasix  and  OP fu with Dr Dawn .    She received a unit of PRBC on 3/24/2023 and CBC was repeated.     Nephrology was consulted for worsening kidney function and recommended to switch IV lasix to po 60 bid, start po iron , Check P and PTH levels and f/u with nephrology as OP.    PT was consulted and recommendations appreciated.    Today, the patient wanted to be discharged. Explained to the patient that she needs to be further monitored.   Still the patient wants to leave AMA. Explained her the risk of doing so, including and not limited to death . Patient is Alert , Oriented x 4.        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 Patient reports left sided chest tightness and discomfort associated with dyspnea on minimal exertion and dry cough. Patient reports that she feels as though she may need a transfusion. Admits to R>L lower extremity edema associated with redness of the right lower leg. She was on Antibiotics (Bactrim?) + Torsemide 20 for ~ 8 days for cellulitis completed the coarse but still has some redness. Reports waxing and waning redness. Denies fever, chills, palpitations, nausea, vomiting, abdominal pain, diarrhea, dysuria.     s/p vanco  s/p Lasix 40 IVP x 1  s/p Insulin + D50    PMHx:Internal hemorrhoids, erosive gastritis, sciatica, uterine fibroid, T12 compression deformity, avascular necrosis of right femoral head, aortic & iliac calcifications  PSHx cholecystectomy, appendectomy    In ED, VS: T 36.3, /66, HR 64, RR 20, SpO2 100% on 2 L/min NC.    Cardiology was consulted and recommended to c/w diuresis, accurate in out, monitor kidney function and electrolytes   -once euvolemic switch to po lasix  and  OP fu with Dr Dawn .    She received a unit of PRBC on 3/24/2023 and CBC was repeated.     Nephrology was consulted for worsening kidney function and recommended to switch IV lasix to po 60 bid, start po iron , Check P and PTH levels and f/u with nephrology as outpatient DW renal recc for pt to stay inpt until cr revaluated in 24 hrs.    PT was consulted and recommendations appreciated.    Today, the patient wanted to be discharged. Explained to the patient that she needs to be further monitored.   Still the patient wants to leave AMA. Explained her the risk of doing so, including and not limited to death . Patient is Alert , Oriented x 4.

## 2023-03-24 NOTE — DISCHARGE NOTE PROVIDER - ATTENDING DISCHARGE PHYSICAL EXAMINATION:
Attending attestation  Attending DC note  Pt seen and examined at bedside.   vitals, labs, exam stable  Hospital course as above.  Plan dw pt and agreed to plan  Med recc completed.  Pt has EVAN, renal recc monitor 24 hrs   Patient is AAO x 3. I explained at length to the patient the risks of signing out AMA including but not limited to harm, injury or death. I explained the risks, benefits and alternatives to treatment as well as the attendant risks of refusing treatment at this time. I offered to answer any questions and fully answered any such questions. The team believes that the patient fully understands what has been explained and answered. Patient signed form to sign out AMA and accepts responsibility for any and all results of this decision.     Attending attestation  Attending DC note  Pt seen and examined at bedside.   vitals, labs, exam stable  Hospital course as above.  Plan dw pt and agreed to plan  Med recc completed.  Pt has EVAN, renal recc monitor 24 hrs . Advised pt to follow up with renal and cardiology. Explained to the pt the high mortality rate with CHF and severe AS and need to follow up with cardio  Patient is AAO x 3. I explained at length to the patient the risks of signing out AMA including but not limited to harm, injury or death. I explained the risks, benefits and alternatives to treatment as well as the attendant risks of refusing treatment at this time. I offered to answer any questions and fully answered any such questions. The team believes that the patient fully understands what has been explained and answered. Patient signed form to sign out AMA and accepts responsibility for any and all results of this decision.

## 2023-03-24 NOTE — DISCHARGE NOTE PROVIDER - CARE PROVIDER_API CALL
Gildardo Bill  [address]  [address] [place] [address]  Phone: [phone]  Fax: [phone]  Established Patient  Follow Up Time: 2 weeks    Jimmie Donald)  Internal Medicine; Nephrology  [address] [place] [address]  Phone: [phone]  Fax: [phone]  Follow Up Time: 1 week    Munir Paula)  Cardiovascular Disease; Internal Medicine; Interventional Cardiology  [address] [facility] [address]  [place] [address]  Phone: [phone]  Fax: [phone]  Established Patient  Follow Up Time: 2 weeks    Jones Austin)  Critical Care Medicine; Internal Medicine; Pulmonary Disease  [address] [place] [address]  Phone: [phone]  Fax: [phone]  Follow Up Time: 1 month    Oj Lewis)  Surgery; Vascular Surgery  [address] [place] [address]  Phone: [phone]  Fax: [phone]  Follow Up Time: 1 month   Gildardo Bill  [address]  [address] [place] [address]  Phone: [phone]  Fax: [phone]  Established Patient  Follow Up Time: 2 weeks    Jimmie Donald)  Internal Medicine; Nephrology  [address] [place] [address]  Phone: [phone]  Fax: [phone]  Follow Up Time: 1 week    Munir Paula)  Cardiovascular Disease; Internal Medicine; Interventional Cardiology  [address] [place] [address]  Phone: [phone]  Fax: [phone]  Established Patient  Follow Up Time: 2 weeks    Jones Austin)  Critical Care Medicine; Internal Medicine; Pulmonary Disease  [address] [place] [address]  Phone: [phone]  Fax: [phone]  Follow Up Time: 1 month    Oj Lewis)  Surgery; Vascular Surgery  [address] [place] [address]  Phone: [phone]  Fax: [phone]  Follow Up Time: 1 month    Reji Hernandez)  Cardiovascular Disease; Internal Medicine  [address] [place] [address]  Phone: [phone]  Fax: [phone]  Follow Up Time: 1 week

## 2023-03-26 LAB
HOMOCYSTEINE LEVEL: 13.7 UMOL/L — HIGH
METHYLMALONIC ACID LEVEL: 405 NMOL/L — HIGH (ref 87–318)

## 2023-03-27 ENCOUNTER — APPOINTMENT (OUTPATIENT)
Dept: GASTROENTEROLOGY | Facility: CLINIC | Age: 78
End: 2023-03-27

## 2023-03-30 ENCOUNTER — LABORATORY RESULT (OUTPATIENT)
Age: 78
End: 2023-03-30

## 2023-03-30 ENCOUNTER — NON-APPOINTMENT (OUTPATIENT)
Age: 78
End: 2023-03-30

## 2023-03-30 ENCOUNTER — INPATIENT (INPATIENT)
Facility: HOSPITAL | Age: 78
LOS: 3 days | Discharge: LEFT BEFORE DISCHARGE | DRG: 378 | End: 2023-04-03
Attending: HOSPITALIST | Admitting: HOSPITALIST
Payer: MEDICARE

## 2023-03-30 VITALS
OXYGEN SATURATION: 97 % | HEIGHT: 67 IN | SYSTOLIC BLOOD PRESSURE: 115 MMHG | RESPIRATION RATE: 20 BRPM | DIASTOLIC BLOOD PRESSURE: 48 MMHG | TEMPERATURE: 98 F | WEIGHT: 231.93 LBS | HEART RATE: 74 BPM

## 2023-03-30 DIAGNOSIS — Z53.20 PROCEDURE AND TREATMENT NOT CARRIED OUT BECAUSE OF PATIENT'S DECISION FOR UNSPECIFIED REASONS: ICD-10-CM

## 2023-03-30 DIAGNOSIS — I35.0 NONRHEUMATIC AORTIC (VALVE) STENOSIS: ICD-10-CM

## 2023-03-30 DIAGNOSIS — D61.818 OTHER PANCYTOPENIA: ICD-10-CM

## 2023-03-30 DIAGNOSIS — K31.819 ANGIODYSPLASIA OF STOMACH AND DUODENUM WITHOUT BLEEDING: ICD-10-CM

## 2023-03-30 DIAGNOSIS — J96.01 ACUTE RESPIRATORY FAILURE WITH HYPOXIA: ICD-10-CM

## 2023-03-30 DIAGNOSIS — Z88.0 ALLERGY STATUS TO PENICILLIN: ICD-10-CM

## 2023-03-30 DIAGNOSIS — N18.4 CHRONIC KIDNEY DISEASE, STAGE 4 (SEVERE): ICD-10-CM

## 2023-03-30 DIAGNOSIS — E87.5 HYPERKALEMIA: ICD-10-CM

## 2023-03-30 DIAGNOSIS — I70.0 ATHEROSCLEROSIS OF AORTA: ICD-10-CM

## 2023-03-30 DIAGNOSIS — I27.20 PULMONARY HYPERTENSION, UNSPECIFIED: ICD-10-CM

## 2023-03-30 DIAGNOSIS — Z95.5 PRESENCE OF CORONARY ANGIOPLASTY IMPLANT AND GRAFT: ICD-10-CM

## 2023-03-30 DIAGNOSIS — E88.09 OTHER DISORDERS OF PLASMA-PROTEIN METABOLISM, NOT ELSEWHERE CLASSIFIED: ICD-10-CM

## 2023-03-30 DIAGNOSIS — J96.02 ACUTE RESPIRATORY FAILURE WITH HYPERCAPNIA: ICD-10-CM

## 2023-03-30 DIAGNOSIS — K64.8 OTHER HEMORRHOIDS: ICD-10-CM

## 2023-03-30 DIAGNOSIS — I50.33 ACUTE ON CHRONIC DIASTOLIC (CONGESTIVE) HEART FAILURE: ICD-10-CM

## 2023-03-30 DIAGNOSIS — K59.00 CONSTIPATION, UNSPECIFIED: ICD-10-CM

## 2023-03-30 DIAGNOSIS — I87.2 VENOUS INSUFFICIENCY (CHRONIC) (PERIPHERAL): ICD-10-CM

## 2023-03-30 DIAGNOSIS — K57.10 DIVERTICULOSIS OF SMALL INTESTINE WITHOUT PERFORATION OR ABSCESS WITHOUT BLEEDING: ICD-10-CM

## 2023-03-30 DIAGNOSIS — Z91.040 LATEX ALLERGY STATUS: ICD-10-CM

## 2023-03-30 DIAGNOSIS — L03.115 CELLULITIS OF RIGHT LOWER LIMB: ICD-10-CM

## 2023-03-30 DIAGNOSIS — Z88.6 ALLERGY STATUS TO ANALGESIC AGENT: ICD-10-CM

## 2023-03-30 DIAGNOSIS — Z86.16 PERSONAL HISTORY OF COVID-19: ICD-10-CM

## 2023-03-30 DIAGNOSIS — D63.1 ANEMIA IN CHRONIC KIDNEY DISEASE: ICD-10-CM

## 2023-03-30 DIAGNOSIS — D50.0 IRON DEFICIENCY ANEMIA SECONDARY TO BLOOD LOSS (CHRONIC): ICD-10-CM

## 2023-03-30 DIAGNOSIS — N17.9 ACUTE KIDNEY FAILURE, UNSPECIFIED: ICD-10-CM

## 2023-03-30 DIAGNOSIS — I13.0 HYPERTENSIVE HEART AND CHRONIC KIDNEY DISEASE WITH HEART FAILURE AND STAGE 1 THROUGH STAGE 4 CHRONIC KIDNEY DISEASE, OR UNSPECIFIED CHRONIC KIDNEY DISEASE: ICD-10-CM

## 2023-03-30 DIAGNOSIS — Z87.19 PERSONAL HISTORY OF OTHER DISEASES OF THE DIGESTIVE SYSTEM: Chronic | ICD-10-CM

## 2023-03-30 DIAGNOSIS — K21.9 GASTRO-ESOPHAGEAL REFLUX DISEASE WITHOUT ESOPHAGITIS: ICD-10-CM

## 2023-03-30 LAB
ALBUMIN SERPL ELPH-MCNC: 3.2 G/DL — LOW (ref 3.5–5.2)
ALP SERPL-CCNC: 70 U/L — SIGNIFICANT CHANGE UP (ref 30–115)
ALT FLD-CCNC: <5 U/L — SIGNIFICANT CHANGE UP (ref 0–41)
ANION GAP SERPL CALC-SCNC: 12 MMOL/L — SIGNIFICANT CHANGE UP (ref 7–14)
AST SERPL-CCNC: 12 U/L — SIGNIFICANT CHANGE UP (ref 0–41)
BASOPHILS # BLD AUTO: 0 K/UL — SIGNIFICANT CHANGE UP (ref 0–0.2)
BASOPHILS NFR BLD AUTO: 0 % — SIGNIFICANT CHANGE UP (ref 0–1)
BILIRUB SERPL-MCNC: 0.7 MG/DL — SIGNIFICANT CHANGE UP (ref 0.2–1.2)
BUN SERPL-MCNC: 66 MG/DL — CRITICAL HIGH (ref 10–20)
CALCIUM SERPL-MCNC: 8.3 MG/DL — LOW (ref 8.4–10.5)
CHLORIDE SERPL-SCNC: 101 MMOL/L — SIGNIFICANT CHANGE UP (ref 98–110)
CO2 SERPL-SCNC: 26 MMOL/L — SIGNIFICANT CHANGE UP (ref 17–32)
CREAT SERPL-MCNC: 2.9 MG/DL — HIGH (ref 0.7–1.5)
EGFR: 16 ML/MIN/1.73M2 — LOW
EOSINOPHIL # BLD AUTO: 0.22 K/UL — SIGNIFICANT CHANGE UP (ref 0–0.7)
EOSINOPHIL NFR BLD AUTO: 6.4 % — SIGNIFICANT CHANGE UP (ref 0–8)
GLUCOSE SERPL-MCNC: 101 MG/DL — HIGH (ref 70–99)
HCT VFR BLD CALC: 22.4 % — LOW (ref 37–47)
HGB BLD-MCNC: 6.8 G/DL — CRITICAL LOW (ref 12–16)
LYMPHOCYTES # BLD AUTO: 0.48 K/UL — LOW (ref 1.2–3.4)
LYMPHOCYTES # BLD AUTO: 13.8 % — LOW (ref 20.5–51.1)
MCHC RBC-ENTMCNC: 27.4 PG — SIGNIFICANT CHANGE UP (ref 27–31)
MCHC RBC-ENTMCNC: 30.4 G/DL — LOW (ref 32–37)
MCV RBC AUTO: 90.3 FL — SIGNIFICANT CHANGE UP (ref 81–99)
MONOCYTES # BLD AUTO: 0.25 K/UL — SIGNIFICANT CHANGE UP (ref 0.1–0.6)
MONOCYTES NFR BLD AUTO: 7.3 % — SIGNIFICANT CHANGE UP (ref 1.7–9.3)
NEUTROPHILS # BLD AUTO: 2.53 K/UL — SIGNIFICANT CHANGE UP (ref 1.4–6.5)
NEUTROPHILS NFR BLD AUTO: 72.5 % — SIGNIFICANT CHANGE UP (ref 42.2–75.2)
NRBC # BLD: SIGNIFICANT CHANGE UP /100 WBCS (ref 0–0)
PLATELET # BLD AUTO: 142 K/UL — SIGNIFICANT CHANGE UP (ref 130–400)
POTASSIUM SERPL-MCNC: 4.1 MMOL/L — SIGNIFICANT CHANGE UP (ref 3.5–5)
POTASSIUM SERPL-SCNC: 4.1 MMOL/L — SIGNIFICANT CHANGE UP (ref 3.5–5)
PROT SERPL-MCNC: 5.7 G/DL — LOW (ref 6–8)
RBC # BLD: 2.48 M/UL — LOW (ref 4.2–5.4)
RBC # FLD: 16.4 % — HIGH (ref 11.5–14.5)
SODIUM SERPL-SCNC: 139 MMOL/L — SIGNIFICANT CHANGE UP (ref 135–146)
WBC # BLD: 3.49 K/UL — LOW (ref 4.8–10.8)
WBC # FLD AUTO: 3.49 K/UL — LOW (ref 4.8–10.8)

## 2023-03-30 PROCEDURE — 99285 EMERGENCY DEPT VISIT HI MDM: CPT

## 2023-03-30 RX ORDER — ACETAMINOPHEN 500 MG
650 TABLET ORAL ONCE
Refills: 0 | Status: COMPLETED | OUTPATIENT
Start: 2023-03-30 | End: 2023-03-30

## 2023-03-30 RX ADMIN — Medication 125 MILLIGRAM(S): at 23:00

## 2023-03-30 RX ADMIN — Medication 650 MILLIGRAM(S): at 23:00

## 2023-03-30 NOTE — ED ADULT NURSE NOTE - SUICIDE SCREENING QUESTION 2
Admitting diagnosis: left knee osteoarthritis    Procedure: Status post left total knee arthroplasty, date of operation 7/9/2019    History of present illness: Pleasant 73 year old female, with long history of left knee pain. Failed no operative treatments. Pain was debilitating and affecting quality of life. Options were discussed and Yulissa Keene wished to proceed with surgery.      Diet: Regular     Activity:  Weight bear as tolerated   CPM for approximately 3-4 hours per day, increasing range of motion as tolerated   Ice as needed   Incision may get wet, uncovered, in shower, as of postoperative day 2   Participate in therapy, evaluate and treat    Medications: See medication reconciliation form    Hospital course:  Patient admitted on 7/9/2019, for left knee replacement. Recovered well from surgery and transferred to the orthopedic postoperative floor, with medical co management. Received one dose of preoperative antibiotics and two doses postoperatively, incision remained free of signs or symptoms of infection. Placed on DVT prophylaxis, including: TEDS, SCD and coumadin. INR monitored by pharmacy. Denied shortness of breath. Acute postoperative anemia blood loss, asymptomatic, no need for post op blood transfusion. Pain well controlled with adductor block and oral pain medications. Tolerated diet adequately. Participated in therapy and safe for discharge home with OP therapy.    Discharge instructions:     Follow up with Dr. Fischer or Mick Rincon 2 weeks postoperative   Coumadin for 6 weeks post operativley   Weekly PT/INR, first draw Friday, with results called to [phone]   Patient received discharge instruction sheet that included 24 hours answering service number and will call with questions or concerns  
No

## 2023-03-30 NOTE — ED ADULT TRIAGE NOTE - ACCOMPANIED BY
Pt states she had wax removed from her right ear Wednesday this week and she is still having some problems hearing out of it.
Immediate family member

## 2023-03-30 NOTE — ED ADULT TRIAGE NOTE - CHIEF COMPLAINT QUOTE
" My kidney function results are abnormal and my Hgb was low, I need blood transfusion." Sent by onco

## 2023-03-31 ENCOUNTER — APPOINTMENT (OUTPATIENT)
Dept: INFUSION THERAPY | Facility: CLINIC | Age: 78
End: 2023-03-31

## 2023-03-31 DIAGNOSIS — Z87.19 PERSONAL HISTORY OF OTHER DISEASES OF THE DIGESTIVE SYSTEM: ICD-10-CM

## 2023-03-31 DIAGNOSIS — K92.2 GASTROINTESTINAL HEMORRHAGE, UNSPECIFIED: ICD-10-CM

## 2023-03-31 LAB
ABO + RH PNL BLD: NORMAL
ALBUMIN SERPL ELPH-MCNC: 3.2 G/DL
ALBUMIN SERPL ELPH-MCNC: 3.2 G/DL — LOW (ref 3.5–5.2)
ALP BLD-CCNC: 67 U/L
ALP SERPL-CCNC: 70 U/L — SIGNIFICANT CHANGE UP (ref 30–115)
ALT FLD-CCNC: <5 U/L — SIGNIFICANT CHANGE UP (ref 0–41)
ALT SERPL-CCNC: <5 U/L
ANION GAP SERPL CALC-SCNC: 12 MMOL/L — SIGNIFICANT CHANGE UP (ref 7–14)
ANION GAP SERPL CALC-SCNC: 14 MMOL/L
AST SERPL-CCNC: 11 U/L — SIGNIFICANT CHANGE UP (ref 0–41)
AST SERPL-CCNC: 13 U/L
BILIRUB SERPL-MCNC: 0.6 MG/DL
BILIRUB SERPL-MCNC: 0.9 MG/DL — SIGNIFICANT CHANGE UP (ref 0.2–1.2)
BUN SERPL-MCNC: 68 MG/DL
BUN SERPL-MCNC: 72 MG/DL — CRITICAL HIGH (ref 10–20)
CALCIUM SERPL-MCNC: 8 MG/DL
CALCIUM SERPL-MCNC: 8.1 MG/DL — LOW (ref 8.4–10.5)
CHLORIDE SERPL-SCNC: 101 MMOL/L
CHLORIDE SERPL-SCNC: 101 MMOL/L — SIGNIFICANT CHANGE UP (ref 98–110)
CO2 SERPL-SCNC: 26 MMOL/L
CO2 SERPL-SCNC: 26 MMOL/L — SIGNIFICANT CHANGE UP (ref 17–32)
CREAT SERPL-MCNC: 3 MG/DL — HIGH (ref 0.7–1.5)
CREAT SERPL-MCNC: 3.2 MG/DL
EGFR: 14 ML/MIN/1.73M2
EGFR: 15 ML/MIN/1.73M2 — LOW
FERRITIN SERPL-MCNC: 24 NG/ML
GLUCOSE SERPL-MCNC: 106 MG/DL
GLUCOSE SERPL-MCNC: 163 MG/DL — HIGH (ref 70–99)
HCT VFR BLD CALC: 22.9 % — LOW (ref 37–47)
HGB BLD-MCNC: 7.3 G/DL — LOW (ref 12–16)
IRON SATN MFR SERPL: 8 %
IRON SERPL-MCNC: 20 UG/DL
MCHC RBC-ENTMCNC: 28.2 PG — SIGNIFICANT CHANGE UP (ref 27–31)
MCHC RBC-ENTMCNC: 31.9 G/DL — LOW (ref 32–37)
MCV RBC AUTO: 88.4 FL — SIGNIFICANT CHANGE UP (ref 81–99)
NRBC # BLD: 0 /100 WBCS — SIGNIFICANT CHANGE UP (ref 0–0)
PLATELET # BLD AUTO: 129 K/UL — LOW (ref 130–400)
POTASSIUM SERPL-MCNC: 4.1 MMOL/L — SIGNIFICANT CHANGE UP (ref 3.5–5)
POTASSIUM SERPL-SCNC: 4 MMOL/L
POTASSIUM SERPL-SCNC: 4.1 MMOL/L — SIGNIFICANT CHANGE UP (ref 3.5–5)
PROT SERPL-MCNC: 5.6 G/DL
PROT SERPL-MCNC: 5.6 G/DL — LOW (ref 6–8)
RBC # BLD: 2.59 M/UL — LOW (ref 4.2–5.4)
RBC # FLD: 15.9 % — HIGH (ref 11.5–14.5)
SARS-COV-2 RNA SPEC QL NAA+PROBE: SIGNIFICANT CHANGE UP
SODIUM SERPL-SCNC: 139 MMOL/L — SIGNIFICANT CHANGE UP (ref 135–146)
SODIUM SERPL-SCNC: 141 MMOL/L
TIBC SERPL-MCNC: 236 UG/DL
UIBC SERPL-MCNC: 216 UG/DL
WBC # BLD: 1.91 K/UL — LOW (ref 4.8–10.8)
WBC # FLD AUTO: 1.91 K/UL — LOW (ref 4.8–10.8)

## 2023-03-31 PROCEDURE — 86900 BLOOD TYPING SEROLOGIC ABO: CPT

## 2023-03-31 PROCEDURE — 99223 1ST HOSP IP/OBS HIGH 75: CPT

## 2023-03-31 PROCEDURE — C1889: CPT

## 2023-03-31 PROCEDURE — 85730 THROMBOPLASTIN TIME PARTIAL: CPT

## 2023-03-31 PROCEDURE — 36430 TRANSFUSION BLD/BLD COMPNT: CPT

## 2023-03-31 PROCEDURE — 84300 ASSAY OF URINE SODIUM: CPT

## 2023-03-31 PROCEDURE — 86922 COMPATIBILITY TEST ANTIGLOB: CPT

## 2023-03-31 PROCEDURE — 83735 ASSAY OF MAGNESIUM: CPT

## 2023-03-31 PROCEDURE — 80061 LIPID PANEL: CPT

## 2023-03-31 PROCEDURE — 86901 BLOOD TYPING SEROLOGIC RH(D): CPT

## 2023-03-31 PROCEDURE — 86902 BLOOD TYPE ANTIGEN DONOR EA: CPT

## 2023-03-31 PROCEDURE — 36415 COLL VENOUS BLD VENIPUNCTURE: CPT

## 2023-03-31 PROCEDURE — 86850 RBC ANTIBODY SCREEN: CPT

## 2023-03-31 PROCEDURE — 76770 US EXAM ABDO BACK WALL COMP: CPT

## 2023-03-31 PROCEDURE — 84156 ASSAY OF PROTEIN URINE: CPT

## 2023-03-31 PROCEDURE — 83935 ASSAY OF URINE OSMOLALITY: CPT

## 2023-03-31 PROCEDURE — P9040: CPT

## 2023-03-31 PROCEDURE — 85027 COMPLETE CBC AUTOMATED: CPT

## 2023-03-31 PROCEDURE — 81003 URINALYSIS AUTO W/O SCOPE: CPT

## 2023-03-31 PROCEDURE — 88305 TISSUE EXAM BY PATHOLOGIST: CPT

## 2023-03-31 PROCEDURE — 82570 ASSAY OF URINE CREATININE: CPT

## 2023-03-31 PROCEDURE — 85610 PROTHROMBIN TIME: CPT

## 2023-03-31 PROCEDURE — 80053 COMPREHEN METABOLIC PANEL: CPT

## 2023-03-31 PROCEDURE — 83036 HEMOGLOBIN GLYCOSYLATED A1C: CPT

## 2023-03-31 PROCEDURE — 84100 ASSAY OF PHOSPHORUS: CPT

## 2023-03-31 PROCEDURE — 87635 SARS-COV-2 COVID-19 AMP PRB: CPT

## 2023-03-31 PROCEDURE — 84540 ASSAY OF URINE/UREA-N: CPT

## 2023-03-31 RX ORDER — DIPHENHYDRAMINE HCL 50 MG
25 CAPSULE ORAL DAILY
Refills: 0 | Status: DISCONTINUED | OUTPATIENT
Start: 2023-03-31 | End: 2023-04-03

## 2023-03-31 RX ORDER — HEPARIN SODIUM 5000 [USP'U]/ML
5000 INJECTION INTRAVENOUS; SUBCUTANEOUS EVERY 8 HOURS
Refills: 0 | Status: DISCONTINUED | OUTPATIENT
Start: 2023-03-31 | End: 2023-03-31

## 2023-03-31 RX ORDER — FOLIC ACID 0.8 MG
1 TABLET ORAL DAILY
Refills: 0 | Status: DISCONTINUED | OUTPATIENT
Start: 2023-03-31 | End: 2023-04-03

## 2023-03-31 RX ORDER — PANTOPRAZOLE SODIUM 20 MG/1
40 TABLET, DELAYED RELEASE ORAL
Refills: 0 | Status: DISCONTINUED | OUTPATIENT
Start: 2023-03-31 | End: 2023-04-03

## 2023-03-31 RX ORDER — METOPROLOL TARTRATE 50 MG
25 TABLET ORAL
Refills: 0 | Status: DISCONTINUED | OUTPATIENT
Start: 2023-03-31 | End: 2023-04-03

## 2023-03-31 RX ORDER — FUROSEMIDE 40 MG
60 TABLET ORAL
Refills: 0 | Status: DISCONTINUED | OUTPATIENT
Start: 2023-03-31 | End: 2023-04-01

## 2023-03-31 RX ORDER — FERROUS SULFATE 325(65) MG
325 TABLET ORAL DAILY
Refills: 0 | Status: DISCONTINUED | OUTPATIENT
Start: 2023-03-31 | End: 2023-04-03

## 2023-03-31 RX ORDER — POLYETHYLENE GLYCOL 3350 17 G/17G
17 POWDER, FOR SOLUTION ORAL DAILY
Refills: 0 | Status: DISCONTINUED | OUTPATIENT
Start: 2023-03-31 | End: 2023-04-03

## 2023-03-31 RX ORDER — ACETAMINOPHEN 500 MG
650 TABLET ORAL EVERY 6 HOURS
Refills: 0 | Status: DISCONTINUED | OUTPATIENT
Start: 2023-03-31 | End: 2023-04-03

## 2023-03-31 RX ADMIN — Medication 60 MILLIGRAM(S): at 06:39

## 2023-03-31 RX ADMIN — Medication 60 MILLIGRAM(S): at 13:12

## 2023-03-31 RX ADMIN — POLYETHYLENE GLYCOL 3350 17 GRAM(S): 17 POWDER, FOR SOLUTION ORAL at 11:47

## 2023-03-31 RX ADMIN — Medication 25 MILLIGRAM(S): at 17:23

## 2023-03-31 RX ADMIN — PANTOPRAZOLE SODIUM 40 MILLIGRAM(S): 20 TABLET, DELAYED RELEASE ORAL at 06:39

## 2023-03-31 RX ADMIN — Medication 25 MILLIGRAM(S): at 06:39

## 2023-03-31 RX ADMIN — Medication 1 MILLIGRAM(S): at 11:47

## 2023-03-31 NOTE — ED PROVIDER NOTE - CLINICAL SUMMARY MEDICAL DECISION MAKING FREE TEXT BOX
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 Gaston for PRBC transfusion for hgb 6.8. Patient denies fever, chills, CP, palpitations, nausea, vomiting, abdominal pain, diarrhea, dysuria. VSS.  EKG: Sinus with PVCs  Hgb 6.8, Cr 2.9.  GI consult, ADMIT.  Transfuse.  Stable for floor.

## 2023-03-31 NOTE — CONSULT NOTE ADULT - ASSESSMENT
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 Gaston for PRBC transfusion for hgb 6.8 and high creatinine (although creatinine at baseline??).   patient denies hematochezia, melena, hematemesis  Patient denies fever, chills, CP, palpitations, nausea, vomiting, abdominal pain, diarrhea, dysuria.     *Anemia  *Pancytopenia  Previous AVM in stomach / duodenum / jejunum   -HD stable  -no signs of bleeding  -hb 6.8 (baseline ~8), patient follows with dr Gaston and gets blood transfusion regularly and iron infusion     rec  -patient will benefit from enteroscopy and repeat colonoscopy. but she is not willing to drink Golytely. will schedule for push enteroscopy Monday (had breakfast today)  -cardiology risk stratification   -hematology evaluation for pancytopenia  -continue IV iron   -2 large bore IV  -T&S  -transfuse to target 8  -please on sunday:     make sure to order for NPO after mid-night    make sure hb > 8    make sure T&S are active     make sure electrolytes are checked and corrected    check INR and make sure it is < 1.5    check Platelets and make sure they are > 50K    -for questions text me on  teams, call [phone] on weekdays before 5pm or call GI service at [phone]  after 5 pm and on weekends

## 2023-03-31 NOTE — H&P ADULT - ASSESSMENT
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 Gaston for PRBC transfusion for hgb 6.8. Patient denies fever, chills, CP, palpitations, nausea, vomiting, abdominal pain, diarrhea, dysuria.     In the ER:  VS: /48, HR 74, RR 20, T 36.8, SpO2 97% RA  EKG: Sinus with PVCs  Sig Labs: WBC 3.49, Hgb 6.8, , BUN 66, Cr 2.9 (BL Cr 2-3) eGFR 16    Interventions in the ER: Tylenol and Solumedrol 125    # Asymptomatic Acute Anemia on Chronic Pancytopenia w/ frequent iron and PRBC transfusions  # hx GI bleeding Duodenum and Gastric AVM capsule endoscopy 02/2022 and enteroscopy 10/14, Multiple AVMs in duodenum. Gastric antral vascular ectasia  - Hgb 6.8 on admission   - Transfuse 1 Unit PRBC, pretreat with Tylenol and Solumedrol 125  - Follow up with   - Trend   - Consider c/s GI for endoscopy and colonoscopy (planned as OP)  - Consider Cardiology consult with Dr Paula for risk stratification for endoscopy. Previous cardio note for endoscopy risk found on 10/12/22 on [place]. Patient planned valve replacement.   - C/w Pantoprazole 40     # Chronic Diastolic CHF (EF 62 03/22/23) with Severe Aortic Stenosis   # Chronic kidney stage IV with intermittent hyperkalemia   - Low K diet  - Cont diuresis     # Constipation  - Cont laxative  - High fiber diet    # HTN/HLD/CKD4  - Patient develops bleeding on aspirin  - Planned valve replacement  - A1C, Lipid Pofile in AM   - c/w metoprolol    # Sciatica  - Tylenol prn    # Uterine fibroid/T12 compression deformity/  hx of avascular necrosis of right femoral head (seen on ct scan from 09/2022)   - Outpatient f/u with orthopedic     # Stasis Dermatitis  - Silvadine & foot care  - Last duplex on 03/23/23 2023 was -ve.     # DVT prophylaxis - Hep 5000TID  # GI prophylaxis - Pantoprazole 40  # Diet - DASH, High Fiber, Low K  # Activity Score (AM-PAC) - AAT  # Code status - Full   # Disposition - From Home

## 2023-03-31 NOTE — ED PROVIDER NOTE - OBJECTIVE STATEMENT
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 Gaston for PRBC transfusion for hgb 6.8. Denies fever/chill/HA/dizziness/chest pain/palpitation/sob/abd pain/n/v/d/ black stool/bloody stool/urinary sxs

## 2023-03-31 NOTE — H&P ADULT - HISTORY OF PRESENT ILLNESS
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 Gaston for PRBC transfusion for hgb 6.8, Denies fever, chills, CP, palpitations, nausea, vomiting, abdominal pain, diarrhea, dysuria.     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 Gaston for PRBC transfusion for hgb 6.8. Patient denies fever, chills, CP, palpitations, nausea, vomiting, abdominal pain, diarrhea, dysuria.     In the ER:  VS: /48, HR 74, RR 20, T 36.8, SpO2 97% RA  EKG: Sinus with PVCs  Sig Labs: WBC 3.49, Hgb 6.8, , BUN 66, Cr 2.9 (BL Cr 2-3) eGFR 16    Interventions in the ER: Tylenol and Solumedrol 125

## 2023-03-31 NOTE — ED PROVIDER NOTE - PROGRESS NOTE DETAILS
pt initially agreed only to 1 unit and AMA as she recently AMA from inpt last week, but after completing transfusion we spoke again and she agreed to at least stay until AM to discuss further with GI, poss renal and cardio as she was told she needed clearance from them for GI to proceed with any intervention.

## 2023-03-31 NOTE — H&P ADULT - NSHPPHYSICALEXAM_GEN_ALL_CORE
GENERAL: NAD  HEAD:  Atraumatic, Normocephalic  EYES: EOMI, PERRLA, conjunctiva and sclera clear  ENT: Moist mucous membranes  NECK: Supple, No JVD  CHEST/LUNG: bilateral crackles to auscultation  HEART: Regular rate and rhythm; systolic murmur heard at aortic area  ABDOMEN: BSx4; Soft, nontender, nondistended  EXTREMITIES:  right lower extremity swelling (non pitting) associated with erythema and tenderness  NERVOUS SYSTEM:  A&Ox3, no focal deficits

## 2023-03-31 NOTE — CONSULT NOTE ADULT - SUBJECTIVE AND OBJECTIVE BOX
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 Gaston for PRBC transfusion for hgb 6.8. Patient denies fever, chills, CP, palpitations, nausea, vomiting, abdominal pain, diarrhea, dysuria.     In the ER:  VS: /48, HR 74, RR 20, T 36.8, SpO2 97% RA  EKG: Sinus with PVCs  Sig Labs: WBC 3.49, Hgb 6.8, , BUN 66, Cr 2.9 (BL Cr 2-3) eGFR 16    Interventions in the ER: Tylenol and Solumedrol 125 (31 Mar 2023 03:18)        PAST MEDICAL & SURGICAL HISTORY  HTN (hypertension)    Heart murmur    Eczema    Anemia  iron infusions and PRBC transfusions    Aortic stenosis    Chronic kidney disease (CKD)    2019 novel coronavirus disease (COVID-19)  4/2020- RE[facility] admission    H/O appendicitis    H/O cholecystitis  s/p cholecystectomy        FAMILY HISTORY:  FAMILY HISTORY:  FH: kidney disease (Father)        SOCIAL HISTORY:  Social History:  Lives with daughter (31 Mar 2023 03:18)      ALLERGIES:  aspirin (Rash; Other)  EKG leads (Hives)  latex (Unknown)  penicillins (Rash; Urticaria; Hives; Blisters)      MEDICATIONS:  ferrous    sulfate 325 milliGRAM(s) Oral daily  folic acid 1 milliGRAM(s) Oral daily  furosemide    Tablet 60 milliGRAM(s) Oral two times a day  metoprolol tartrate 25 milliGRAM(s) Oral two times a day  pantoprazole    Tablet 40 milliGRAM(s) Oral before breakfast  polyethylene glycol 3350 17 Gram(s) Oral daily    PRN:  acetaminophen     Tablet .. 650 milliGRAM(s) Oral every 6 hours PRN  diphenhydrAMINE 25 milliGRAM(s) Oral daily PRN      HOME MEDICATIONS:  Home Medications:  Benadryl 25 mg oral capsule: 1 cap(s) orally once a day (at bedtime), As Needed (21 Mar 2023 23:37)  folic acid 1 mg oral tablet: 1 tab(s) orally once a day (21 Mar 2023 23:37)  metoprolol tartrate 25 mg oral tablet: 1 tab(s) orally 2 times a day (21 Mar 2023 23:37)  MiraLax oral powder for reconstitution: 17 gram(s) orally once a day (21 Mar 2023 23:37)  Retacrit 20,000 units/mL injectable solution: 40442 unit(s) injectable once a week (21 Mar 2023 23:37)      VITALS:   T(F): 96.7 (03-31 @ 07:54), Max: 98.3 (03-31 @ 00:25)  HR: 66 (03-31 @ 07:54) (66 - 83)  BP: 110/55 (03-31 @ 07:54) (99/46 - 115/48)  BP(mean): --  RR: 19 (03-31 @ 07:54) (14 - 20)  SpO2: 98% (03-31 @ 02:20) (97% - 99%)    I&O's Summary      REVIEW OF SYSTEMS:  CONSTITUTIONAL: No weakness, fevers or chills  HEENT: No visual changes, neck/ear pain  RESPIRATORY: No cough  CARDIOVASCULAR: See HPI  GASTROINTESTINAL: see HPI  GENITOURINARY: No dysuria, frequency or hematuria  NEUROLOGICAL: No new focal deficits  SKIN: No new rashes    PHYSICAL EXAM:  General: Not in distress.  Non-toxic appearing.   HEENT: EOMI  Cardio: regular, S1, S2, KATHYA 3/6 in aortic area  Pulm: Decreased air entry b/l bases  Abdomen: Soft, non-tender, non-distended. Normoactive bowel sounds  Extremities: No edema b/l le  Neuro: A&O x3. No focal deficits    LABS:                        7.3    1.91  )-----------( 129      ( 31 Mar 2023 14:27 )             22.9     03-31    139  |  101  |  72<HH>  ----------------------------<  163<H>  4.1   |  26  |  3.0<H>    Ca    8.1<L>      31 Mar 2023 14:27    TPro  5.6<L>  /  Alb  3.2<L>  /  TBili  0.9  /  DBili  x   /  AST  11  /  ALT  <5  /  AlkPhos  70  03-31              Troponin trend:      03-22 Chol 92 LDL -- HDL 40<L> Trig 78, 03-22 Chol 92 LDL -- HDL 42<L> Trig 85  COVID-19 PCR: NotDetec (31 Mar 2023 02:45)  COVID-19 PCR: NotDetec (21 Mar 2023 16:10)  COVID-19 PCR: NotDetec (02 Mar 2023 16:47)  COVID-19 PCR: Detected (08 Jan 2023 21:30)  COVID-19 PCR: Detected (05 Jan 2023 19:53)  COVID-19 PCR: NotDetec (11 Oct 2022 19:41)      RADIOLOGY:  < from: Xray Chest 1 View- PORTABLE-Routine (Xray Chest 1 View- PORTABLE-Routine .) (03.24.23 @ 10:23) >  Impression:    Stable bibasilar opacity/effusions. No pneumothorax.    --- End of Report ---    < end of copied text >    < from: TTE Echo Complete w/o Contrast w/ Doppler (03.22.23 @ 14:29) >  Summary:   1. Normal global left ventricular systolic function.   2. LV Ejection Fraction by Brown's Method with a biplane EF of 62 %.   3. Mildly increased LV wall thickness.   4. Spectral Doppler shows pseudonormal pattern of left ventricular   myocardial filling (Grade II diastolic dysfunction).   5. Mildly enlarged left atrium.   6. Normal right atrial size.   7. Mild mitral valve regurgitation.   8. Mild thickening of the anterior and posterior mitral valve leaflets.   9. Mild tricuspid regurgitation.  10. Severe aortic stenosis, peak/mean pressure gradient 120/68 mmHg, SHILA   0.9 cm^2.    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 Gaston for PRBC transfusion for hgb 6.8. Patient denies fever, chills, CP, palpitations, nausea, vomiting, abdominal pain, diarrhea, dysuria.     In the ER:  VS: /48, HR 74, RR 20, T 36.8, SpO2 97% RA  EKG: Sinus with PVCs  Sig Labs: WBC 3.49, Hgb 6.8, , BUN 66, Cr 2.9 (BL Cr 2-3) eGFR 16    Interventions in the ER: Tylenol and Solumedrol 125 (31 Mar 2023 03:18)        PAST MEDICAL & SURGICAL HISTORY  HTN (hypertension)    Heart murmur    Eczema    Anemia  iron infusions and PRBC transfusions    Aortic stenosis    Chronic kidney disease (CKD)    2019 novel coronavirus disease (COVID-19)  4/2020- RE[facility] admission    H/O appendicitis    H/O cholecystitis  s/p cholecystectomy        FAMILY HISTORY:  FAMILY HISTORY:  FH: kidney disease (Father)        SOCIAL HISTORY:  Social History:  Lives with daughter (31 Mar 2023 03:18)      ALLERGIES:  aspirin (Rash; Other)  EKG leads (Hives)  latex (Unknown)  penicillins (Rash; Urticaria; Hives; Blisters)      MEDICATIONS:  ferrous    sulfate 325 milliGRAM(s) Oral daily  folic acid 1 milliGRAM(s) Oral daily  furosemide    Tablet 60 milliGRAM(s) Oral two times a day  metoprolol tartrate 25 milliGRAM(s) Oral two times a day  pantoprazole    Tablet 40 milliGRAM(s) Oral before breakfast  polyethylene glycol 3350 17 Gram(s) Oral daily    PRN:  acetaminophen     Tablet .. 650 milliGRAM(s) Oral every 6 hours PRN  diphenhydrAMINE 25 milliGRAM(s) Oral daily PRN      HOME MEDICATIONS:  Home Medications:  Benadryl 25 mg oral capsule: 1 cap(s) orally once a day (at bedtime), As Needed (21 Mar 2023 23:37)  folic acid 1 mg oral tablet: 1 tab(s) orally once a day (21 Mar 2023 23:37)  metoprolol tartrate 25 mg oral tablet: 1 tab(s) orally 2 times a day (21 Mar 2023 23:37)  MiraLax oral powder for reconstitution: 17 gram(s) orally once a day (21 Mar 2023 23:37)  Retacrit 20,000 units/mL injectable solution: 52615 unit(s) injectable once a week (21 Mar 2023 23:37)      VITALS:   T(F): 96.7 (03-31 @ 07:54), Max: 98.3 (03-31 @ 00:25)  HR: 66 (03-31 @ 07:54) (66 - 83)  BP: 110/55 (03-31 @ 07:54) (99/46 - 115/48)  BP(mean): --  RR: 19 (03-31 @ 07:54) (14 - 20)  SpO2: 98% (03-31 @ 02:20) (97% - 99%)    I&O's Summary      REVIEW OF SYSTEMS:  CONSTITUTIONAL: No weakness, fevers or chills  HEENT: No visual changes, neck/ear pain  RESPIRATORY: No cough  CARDIOVASCULAR: See HPI  GASTROINTESTINAL: see HPI  GENITOURINARY: No dysuria, frequency or hematuria  NEUROLOGICAL: No new focal deficits  SKIN: No new rashes    PHYSICAL EXAM:  General: Not in distress.     HEENT: EOMI  Cardio: regular, S1, S2, KATHYA 3/6 in aortic area  Pulm: Decreased air entry b/l bases  Abdomen: Soft, non-tender, non-distended. Normoactive bowel sounds  Extremities: No edema b/l le  Neuro: A&O x3. No focal deficits    LABS:                        7.3    1.91  )-----------( 129      ( 31 Mar 2023 14:27 )             22.9     03-31    139  |  101  |  72<HH>  ----------------------------<  163<H>  4.1   |  26  |  3.0<H>    Ca    8.1<L>      31 Mar 2023 14:27    TPro  5.6<L>  /  Alb  3.2<L>  /  TBili  0.9  /  DBili  x   /  AST  11  /  ALT  <5  /  AlkPhos  70  03-31              Troponin trend:      03-22 Chol 92 LDL -- HDL 40<L> Trig 78, 03-22 Chol 92 LDL -- HDL 42<L> Trig 85  COVID-19 PCR: NotDetec (31 Mar 2023 02:45)  COVID-19 PCR: NotDetec (21 Mar 2023 16:10)  COVID-19 PCR: NotDetec (02 Mar 2023 16:47)  COVID-19 PCR: Detected (08 Jan 2023 21:30)  COVID-19 PCR: Detected (05 Jan 2023 19:53)  COVID-19 PCR: NotDetec (11 Oct 2022 19:41)      RADIOLOGY:  < from: Xray Chest 1 View- PORTABLE-Routine (Xray Chest 1 View- PORTABLE-Routine .) (03.24.23 @ 10:23) >  Impression:    Stable bibasilar opacity/effusions. No pneumothorax.    --- End of Report ---    < end of copied text >    < from: TTE Echo Complete w/o Contrast w/ Doppler (03.22.23 @ 14:29) >  Summary:   1. Normal global left ventricular systolic function.   2. LV Ejection Fraction by Brown's Method with a biplane EF of 62 %.   3. Mildly increased LV wall thickness.   4. Spectral Doppler shows pseudonormal pattern of left ventricular   myocardial filling (Grade II diastolic dysfunction).   5. Mildly enlarged left atrium.   6. Normal right atrial size.   7. Mild mitral valve regurgitation.   8. Mild thickening of the anterior and posterior mitral valve leaflets.   9. Mild tricuspid regurgitation.  10. Severe aortic stenosis, peak/mean pressure gradient 120/68 mmHg, SHILA   0.9 cm^2.    < end of copied text >        -OTHER:  ECG:  Sinus with PACs RBBB

## 2023-03-31 NOTE — PATIENT PROFILE ADULT - FALL HARM RISK - HARM RISK INTERVENTIONS

## 2023-03-31 NOTE — CONSULT NOTE ADULT - SUBJECTIVE AND OBJECTIVE BOX
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 Gaston for PRBC transfusion for hgb 6.8. Patient denies fever, chills, CP, palpitations, nausea, vomiting, abdominal pain, diarrhea, dysuria.     In the ER:  VS: /48, HR 74, RR 20, T 36.8, SpO2 97% RA  EKG: Sinus with PVCs  Sig Labs: WBC 3.49, Hgb 6.8, , BUN 66, Cr 2.9 (BL Cr 2-3) eGFR 16    Interventions in the ER: Tylenol and Solumedrol 125     Prior records Reviewed (Y/N):  History obtained from person other than patient (Y/N):     Prior EGD:  < from: Enteroscopy (10.14.22 @ 12:00) >  Impressions:    Normal mucosa in the whole esophagus.    Angioectasias in the antrum. (Hemostasis).    Multiple small AVMs were noted in duodenal bulb and second part of duodenum.  APCs were applied for the purpose of hemostatsis.    PCF scope for enteroscopy. Proximal jejunum examined. No blood was noted in  jejunum. 2 small non-bleeding AVMs were noted in proximal jejunum. APCs were  applied for the purpose of hemostasis.    < end of copied text >    Prior Colonoscopy: < from: Colonoscopy (10.22.19 @ 14:00) >  Impressions:    Moderate diverticulosis of the sigmoid colon, descending colon, cecum, distal  descending colon and ascending colon.    Grade 2 internal hemorrhoids.    Solid stool in some spots throughout colon.     < end of copied text >      PAST MEDICAL & SURGICAL HISTORY:  HTN (hypertension)   Heart murmur  Eczema  Anemia  iron infusions and PRBC transfusions  Aortic stenosis  Chronic kidney disease (CKD)  2019 novel coronavirus disease (COVID-19)  4/2020- REHAB admission  H/O appendicitis  H/O cholecystitis  s/p cholecystectomy    FAMILY HISTORY:  FH: kidney disease (Father)    Social History:  Tobacco:  Alcohol:  Drugs:    Home Medications:  Benadryl 25 mg oral capsule: 1 cap(s) orally once a day (at bedtime), As Needed (21 Mar 2023 23:37)  folic acid 1 mg oral tablet: 1 tab(s) orally once a day (21 Mar 2023 23:37)  metoprolol tartrate 25 mg oral tablet: 1 tab(s) orally 2 times a day (21 Mar 2023 23:37)  MiraLax oral powder for reconstitution: 17 gram(s) orally once a day (21 Mar 2023 23:37)  Retacrit 20,000 units/mL injectable solution: 51556 unit(s) injectable once a week (21 Mar 2023 23:37)    MEDICATIONS  (STANDING):  ferrous    sulfate 325 milliGRAM(s) Oral daily  folic acid 1 milliGRAM(s) Oral daily  furosemide    Tablet 60 milliGRAM(s) Oral two times a day  metoprolol tartrate 25 milliGRAM(s) Oral two times a day  pantoprazole    Tablet 40 milliGRAM(s) Oral before breakfast  polyethylene glycol 3350 17 Gram(s) Oral daily    MEDICATIONS  (PRN):  acetaminophen     Tablet .. 650 milliGRAM(s) Oral every 6 hours PRN Mild Pain (1 - 3)  diphenhydrAMINE 25 milliGRAM(s) Oral daily PRN Rash and/or Itching      Allergies  aspirin (Rash; Other)  EKG leads (Hives)  latex (Unknown)  penicillins (Rash; Urticaria; Hives; Blisters)      Review of Systems:   Constitutional:  No Fever, No Chills  ENT/Mouth:  No Hearing Changes,  No Difficulty Swallowing  Eyes:  No Eye Pain, No Vision Changes  Cardiovascular:  No Chest Pain, No Palpitations  Respiratory:  No Cough, No Dyspnea  Gastrointestinal:  As described in HPI  Musculoskeletal:  No Joint Swelling, No Back Pain  Skin:  No Skin Lesions, No Jaundice  Neuro:  No Syncope, No Dizziness  Heme/Lymph:  No Bruising, No Bleeding.    Physical Examination:  T(C): 35.9 (03-31-23 @ 07:54), Max: 36.8 (03-30-23 @ 19:41)  HR: 66 (03-31-23 @ 07:54) (66 - 83)  BP: 110/55 (03-31-23 @ 07:54) (99/46 - 115/48)  RR: 19 (03-31-23 @ 07:54) (14 - 20)  SpO2: 98% (03-31-23 @ 02:20) (97% - 99%)  Height (cm): 170.2 (03-31-23 @ 06:28)  Weight (kg): 109.8 (03-31-23 @ 06:28)      Constitutional: No acute distress.  Eyes:. Conjunctivae are clear, Sclera is non-icteric.  Ears Nose and Throat: The external ears are normal appearing,  Oral mucosa is pink and moist.  Respiratory:  No signs of respiratory distress. Lung sounds are clear bilaterally.  Cardiovascular:  S1 S2, Regular rate and rhythm.  GI: Abdomen is soft, symmetric, and non-tender without distention. There are no visible lesions or scars. Bowel sounds are present and normoactive in all four quadrants. No masses, hepatomegaly, or splenomegaly are noted.   Neuro: AO*3, no asterixis  Skin: No rashes, No Jaundice.    Data: (reviewed by attending)                        6.8    3.49  )-----------( 142      ( 30 Mar 2023 20:39 )             22.4     Hgb Trend:  6.8  03-30-23 @ 20:39      03-30    139  |  101  |  66<HH>  ----------------------------<  101<H>  4.1   |  26  |  2.9<H>    Ca    8.3<L>      30 Mar 2023 20:39    TPro  5.7<L>  /  Alb  3.2<L>  /  TBili  0.7  /  DBili  x   /  AST  12  /  ALT  <5  /  AlkPhos  70  03-30    Liver panel trend:  TBili 0.7   /   AST 12   /   ALT <5   /   AlkP 70   /   Tptn 5.7   /   Alb 3.2    /   DBili --      03-30  TBili 1.0   /   AST 10   /   ALT <5   /   AlkP 83   /   Tptn 5.3   /   Alb 3.0    /   DBili --      03-23  TBili 1.1   /   AST 11   /   ALT 5   /   AlkP 90   /   Tptn 5.3   /   Alb 3.1    /   DBili --      03-22  TBili 1.0   /   AST 11   /   ALT 5   /   AlkP 94   /   Tptn 5.5   /   Alb 3.1    /   DBili -- 03-22  TBili 1.1   /   AST 13   /   ALT 6   /   AlkP 98   /   Tptn 5.6   /   Alb 3.3    /   DBili -- 03-21     Radiology:(reviewed by attending)       Gastroenterology Consultation:    Patient is a 77y old  Female who presents with a chief complaint of Low Hgb As OP (31 Mar 2023 03:18)      Admitted on: 03-31-23  HPI:  77-year-old female w/ history of HTN, HLD, CKD4, Severe AS (last echo 0.9cm2, EF 62%, GIIDD), mild PHTN, hx GI bleeding (from Duodenum and Gastric AVM capsule endoscopy 02/2022 and enteroscopy 10/14 found to have 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 Gaston for PRBC transfusion for hgb 6.8 and high creatinine. Patient denies fever, chills, CP, palpitations, nausea, vomiting, abdominal pain, diarrhea, dysuria.     In the ER:  VS: /48, HR 74, RR 20, T 36.8, SpO2 97% RA  EKG: Sinus with PVCs  Sig Labs: WBC 3.49, Hgb 6.8, , BUN 66, Cr 2.9 (BL Cr 2-3) eGFR 16    Interventions in the ER: Tylenol and Solumedrol 125         Prior EGD:  < from: Enteroscopy (10.14.22 @ 12:00) >  Impressions:    Normal mucosa in the whole esophagus.    Angioectasias in the antrum. (Hemostasis).    Multiple small AVMs were noted in duodenal bulb and second part of duodenum.  APCs were applied for the purpose of hemostatsis.    PCF scope for enteroscopy. Proximal jejunum examined. No blood was noted in  jejunum. 2 small non-bleeding AVMs were noted in proximal jejunum. APCs were  applied for the purpose of hemostasis.    < end of copied text >    Prior Colonoscopy: < from: Colonoscopy (10.22.19 @ 14:00) >  Impressions:    Moderate diverticulosis of the sigmoid colon, descending colon, cecum, distal  descending colon and ascending colon.    Grade 2 internal hemorrhoids.    Solid stool in some spots throughout colon.     < end of copied text >      PAST MEDICAL & SURGICAL HISTORY:  HTN (hypertension)   Heart murmur  Eczema  Anemia  iron infusions and PRBC transfusions  Aortic stenosis  Chronic kidney disease (CKD)  2019 novel coronavirus disease (COVID-19)  4/2020- REHAB admission  H/O appendicitis  H/O cholecystitis  s/p cholecystectomy    FAMILY HISTORY:  FH: kidney disease (Father)    Social History:  Tobacco:  Alcohol:  Drugs:    Home Medications:  Benadryl 25 mg oral capsule: 1 cap(s) orally once a day (at bedtime), As Needed (21 Mar 2023 23:37)  folic acid 1 mg oral tablet: 1 tab(s) orally once a day (21 Mar 2023 23:37)  metoprolol tartrate 25 mg oral tablet: 1 tab(s) orally 2 times a day (21 Mar 2023 23:37)  MiraLax oral powder for reconstitution: 17 gram(s) orally once a day (21 Mar 2023 23:37)  Retacrit 20,000 units/mL injectable solution: 25389 unit(s) injectable once a week (21 Mar 2023 23:37)    MEDICATIONS  (STANDING):  ferrous    sulfate 325 milliGRAM(s) Oral daily  folic acid 1 milliGRAM(s) Oral daily  furosemide    Tablet 60 milliGRAM(s) Oral two times a day  metoprolol tartrate 25 milliGRAM(s) Oral two times a day  pantoprazole    Tablet 40 milliGRAM(s) Oral before breakfast  polyethylene glycol 3350 17 Gram(s) Oral daily    MEDICATIONS  (PRN):  acetaminophen     Tablet .. 650 milliGRAM(s) Oral every 6 hours PRN Mild Pain (1 - 3)  diphenhydrAMINE 25 milliGRAM(s) Oral daily PRN Rash and/or Itching      Allergies  aspirin (Rash; Other)  EKG leads (Hives)  latex (Unknown)  penicillins (Rash; Urticaria; Hives; Blisters)      Review of Systems:   Constitutional:  No Fever, No Chills  ENT/Mouth:  No Hearing Changes,  No Difficulty Swallowing  Eyes:  No Eye Pain, No Vision Changes  Cardiovascular:  No Chest Pain, No Palpitations  Respiratory:  No Cough, No Dyspnea  Gastrointestinal:  As described in HPI  Musculoskeletal:  No Joint Swelling, No Back Pain  Skin:  No Skin Lesions, No Jaundice  Neuro:  No Syncope, No Dizziness  Heme/Lymph:  No Bruising, No Bleeding.    Physical Examination:  T(C): 35.9 (03-31-23 @ 07:54), Max: 36.8 (03-30-23 @ 19:41)  HR: 66 (03-31-23 @ 07:54) (66 - 83)  BP: 110/55 (03-31-23 @ 07:54) (99/46 - 115/48)  RR: 19 (03-31-23 @ 07:54) (14 - 20)  SpO2: 98% (03-31-23 @ 02:20) (97% - 99%)  Height (cm): 170.2 (03-31-23 @ 06:28)  Weight (kg): 109.8 (03-31-23 @ 06:28)      Constitutional: No acute distress.  Eyes:. Conjunctivae are clear, Sclera is non-icteric.  Ears Nose and Throat: The external ears are normal appearing,  Oral mucosa is pink and moist.  Respiratory:  No signs of respiratory distress. Lung sounds are clear bilaterally.  Cardiovascular:  S1 S2, Regular rate and rhythm.  GI: Abdomen is soft, symmetric, and non-tender without distention. There are no visible lesions or scars. Bowel sounds are present and normoactive in all four quadrants. No masses, hepatomegaly, or splenomegaly are noted.   Neuro: AO*3, no asterixis  Skin: No rashes, No Jaundice.    Data: (reviewed by attending)                        6.8    3.49  )-----------( 142      ( 30 Mar 2023 20:39 )             22.4     Hgb Trend:  6.8  03-30-23 @ 20:39      03-30    139  |  101  |  66<HH>  ----------------------------<  101<H>  4.1   |  26  |  2.9<H>    Ca    8.3<L>      30 Mar 2023 20:39    TPro  5.7<L>  /  Alb  3.2<L>  /  TBili  0.7  /  DBili  x   /  AST  12  /  ALT  <5  /  AlkPhos  70  03-30    Liver panel trend:  TBili 0.7   /   AST 12   /   ALT <5   /   AlkP 70   /   Tptn 5.7   /   Alb 3.2    /   DBili --      03-30  TBili 1.0   /   AST 10   /   ALT <5   /   AlkP 83   /   Tptn 5.3   /   Alb 3.0    /   DBili --      03-23  TBili 1.1   /   AST 11   /   ALT 5   /   AlkP 90   /   Tptn 5.3   /   Alb 3.1    /   DBili --      03-22  TBili 1.0   /   AST 11   /   ALT 5   /   AlkP 94   /   Tptn 5.5   /   Alb 3.1    /   DBili --      03-22  TBili 1.1   /   AST 13   /   ALT 6   /   AlkP 98   /   Tptn 5.6   /   Alb 3.3    /   DBili --      03-21     Radiology:(reviewed by attending)       Gastroenterology Consultation:    Patient is a 77y old  Female who presents with a chief complaint of Low Hgb As OP (31 Mar 2023 03:18)      Admitted on: 03-31-23  HPI:  77-year-old female w/ history of HTN, HLD, CKD4, Severe AS (last echo 0.9cm2, EF 62%, GIIDD), mild PHTN, hx GI bleeding (from Duodenum and Gastric AVM capsule endoscopy 02/2022 and enteroscopy 10/14 found to have 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 Gaston for PRBC transfusion for hgb 6.8 and high creatinine (although creatinine at baseline??).   patient denies   Patient denies fever, chills, CP, palpitations, nausea, vomiting, abdominal pain, diarrhea, dysuria.     In the ER:  VS: /48, HR 74, RR 20, T 36.8, SpO2 97% RA  EKG: Sinus with PVCs  Sig Labs: WBC 3.49, Hgb 6.8, , BUN 66, Cr 2.9 (BL Cr 2-3) eGFR 16    Interventions in the ER: Tylenol and Solumedrol 125         Prior EGD:  < from: Enteroscopy (10.14.22 @ 12:00) >  Impressions:    Normal mucosa in the whole esophagus.    Angioectasias in the antrum. (Hemostasis).    Multiple small AVMs were noted in duodenal bulb and second part of duodenum.  APCs were applied for the purpose of hemostatsis.    PCF scope for enteroscopy. Proximal jejunum examined. No blood was noted in  jejunum. 2 small non-bleeding AVMs were noted in proximal jejunum. APCs were  applied for the purpose of hemostasis.    < end of copied text >    Prior Colonoscopy: < from: Colonoscopy (10.22.19 @ 14:00) >  Impressions:    Moderate diverticulosis of the sigmoid colon, descending colon, cecum, distal  descending colon and ascending colon.    Grade 2 internal hemorrhoids.    Solid stool in some spots throughout colon.     < end of copied text >      PAST MEDICAL & SURGICAL HISTORY:  HTN (hypertension)   Heart murmur  Eczema  Anemia  iron infusions and PRBC transfusions  Aortic stenosis  Chronic kidney disease (CKD)  2019 novel coronavirus disease (COVID-19)  4/2020- REHAB admission  H/O appendicitis  H/O cholecystitis  s/p cholecystectomy    FAMILY HISTORY:  FH: kidney disease (Father)    Social History:  Tobacco:  Alcohol:  Drugs:    Home Medications:  Benadryl 25 mg oral capsule: 1 cap(s) orally once a day (at bedtime), As Needed (21 Mar 2023 23:37)  folic acid 1 mg oral tablet: 1 tab(s) orally once a day (21 Mar 2023 23:37)  metoprolol tartrate 25 mg oral tablet: 1 tab(s) orally 2 times a day (21 Mar 2023 23:37)  MiraLax oral powder for reconstitution: 17 gram(s) orally once a day (21 Mar 2023 23:37)  Retacrit 20,000 units/mL injectable solution: 80114 unit(s) injectable once a week (21 Mar 2023 23:37)    MEDICATIONS  (STANDING):  ferrous    sulfate 325 milliGRAM(s) Oral daily  folic acid 1 milliGRAM(s) Oral daily  furosemide    Tablet 60 milliGRAM(s) Oral two times a day  metoprolol tartrate 25 milliGRAM(s) Oral two times a day  pantoprazole    Tablet 40 milliGRAM(s) Oral before breakfast  polyethylene glycol 3350 17 Gram(s) Oral daily    MEDICATIONS  (PRN):  acetaminophen     Tablet .. 650 milliGRAM(s) Oral every 6 hours PRN Mild Pain (1 - 3)  diphenhydrAMINE 25 milliGRAM(s) Oral daily PRN Rash and/or Itching      Allergies  aspirin (Rash; Other)  EKG leads (Hives)  latex (Unknown)  penicillins (Rash; Urticaria; Hives; Blisters)      Review of Systems:   Constitutional:  No Fever, No Chills  ENT/Mouth:  No Hearing Changes,  No Difficulty Swallowing  Eyes:  No Eye Pain, No Vision Changes  Cardiovascular:  No Chest Pain, No Palpitations  Respiratory:  No Cough, No Dyspnea  Gastrointestinal:  As described in HPI  Musculoskeletal:  No Joint Swelling, No Back Pain  Skin:  No Skin Lesions, No Jaundice  Neuro:  No Syncope, No Dizziness  Heme/Lymph:  No Bruising, No Bleeding.    Physical Examination:  T(C): 35.9 (03-31-23 @ 07:54), Max: 36.8 (03-30-23 @ 19:41)  HR: 66 (03-31-23 @ 07:54) (66 - 83)  BP: 110/55 (03-31-23 @ 07:54) (99/46 - 115/48)  RR: 19 (03-31-23 @ 07:54) (14 - 20)  SpO2: 98% (03-31-23 @ 02:20) (97% - 99%)  Height (cm): 170.2 (03-31-23 @ 06:28)  Weight (kg): 109.8 (03-31-23 @ 06:28)      Constitutional: No acute distress.  Eyes:. Conjunctivae are clear, Sclera is non-icteric.  Ears Nose and Throat: The external ears are normal appearing,  Oral mucosa is pink and moist.  Respiratory:  No signs of respiratory distress. Lung sounds are clear bilaterally.  Cardiovascular:  S1 S2, Regular rate and rhythm.  GI: Abdomen is soft, symmetric, and non-tender without distention. There are no visible lesions or scars. Bowel sounds are present and normoactive in all four quadrants. No masses, hepatomegaly, or splenomegaly are noted.   Neuro: AO*3, no asterixis  Skin: No rashes, No Jaundice.    Data: (reviewed by attending)                        6.8    3.49  )-----------( 142      ( 30 Mar 2023 20:39 )             22.4     Hgb Trend:  6.8  03-30-23 @ 20:39      03-30    139  |  101  |  66<HH>  ----------------------------<  101<H>  4.1   |  26  |  2.9<H>    Ca    8.3<L>      30 Mar 2023 20:39    TPro  5.7<L>  /  Alb  3.2<L>  /  TBili  0.7  /  DBili  x   /  AST  12  /  ALT  <5  /  AlkPhos  70  03-30    Liver panel trend:  TBili 0.7   /   AST 12   /   ALT <5   /   AlkP 70   /   Tptn 5.7   /   Alb 3.2    /   DBili --      03-30  TBili 1.0   /   AST 10   /   ALT <5   /   AlkP 83   /   Tptn 5.3   /   Alb 3.0    /   DBili --      03-23  TBili 1.1   /   AST 11   /   ALT 5   /   AlkP 90   /   Tptn 5.3   /   Alb 3.1    /   DBili --      03-22  TBili 1.0   /   AST 11   /   ALT 5   /   AlkP 94   /   Tptn 5.5   /   Alb 3.1    /   DBili --      03-22  TBili 1.1   /   AST 13   /   ALT 6   /   AlkP 98   /   Tptn 5.6   /   Alb 3.3    /   DBili --      03-21     Radiology:(reviewed by attending)       Gastroenterology Consultation:    Patient is a 77y old  Female who presents with a chief complaint of Low Hgb As OP (31 Mar 2023 03:18)      Admitted on: 03-31-23  HPI:  77-year-old female w/ history of HTN, HLD, CKD4, Severe AS (last echo 0.9cm2, EF 62%, GIIDD), mild PHTN, hx GI bleeding (from Duodenum and Gastric AVM capsule endoscopy 02/2022 and enteroscopy 10/14 found to have 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 Gaston for PRBC transfusion for hgb 6.8 and high creatinine (although creatinine at baseline??).   patient denies hematochezia, melena, hematemesis  Patient denies fever, chills, CP, palpitations, nausea, vomiting, abdominal pain, diarrhea, dysuria.     In the ER:  VS: /48, HR 74, RR 20, T 36.8, SpO2 97% RA  EKG: Sinus with PVCs  Sig Labs: WBC 3.49, Hgb 6.8, , BUN 66, Cr 2.9 (BL Cr 2-3) eGFR 16      Prior EGD:  < from: Enteroscopy (10.14.22 @ 12:00) >  Impressions:    Normal mucosa in the whole esophagus.    Angioectasias in the antrum. (Hemostasis).    Multiple small AVMs were noted in duodenal bulb and second part of duodenum.  APCs were applied for the purpose of hemostatsis.    PCF scope for enteroscopy. Proximal jejunum examined. No blood was noted in  jejunum. 2 small non-bleeding AVMs were noted in proximal jejunum. APCs were  applied for the purpose of hemostasis.    < end of copied text >    Prior Colonoscopy: < from: Colonoscopy (10.22.19 @ 14:00) >  Impressions:    Moderate diverticulosis of the sigmoid colon, descending colon, cecum, distal  descending colon and ascending colon.    Grade 2 internal hemorrhoids.    Solid stool in some spots throughout colon.     < end of copied text >      PAST MEDICAL & SURGICAL HISTORY:  HTN (hypertension)   Heart murmur  Eczema  Anemia  iron infusions and PRBC transfusions  Aortic stenosis  Chronic kidney disease (CKD)  2019 novel coronavirus disease (COVID-19)  4/2020- REHAB admission  H/O appendicitis  H/O cholecystitis  s/p cholecystectomy    FAMILY HISTORY:  FH: kidney disease (Father)    Social History:  Tobacco:  Alcohol:  Drugs:    Home Medications:  Benadryl 25 mg oral capsule: 1 cap(s) orally once a day (at bedtime), As Needed (21 Mar 2023 23:37)  folic acid 1 mg oral tablet: 1 tab(s) orally once a day (21 Mar 2023 23:37)  metoprolol tartrate 25 mg oral tablet: 1 tab(s) orally 2 times a day (21 Mar 2023 23:37)  MiraLax oral powder for reconstitution: 17 gram(s) orally once a day (21 Mar 2023 23:37)  Retacrit 20,000 units/mL injectable solution: 28957 unit(s) injectable once a week (21 Mar 2023 23:37)    MEDICATIONS  (STANDING):  ferrous    sulfate 325 milliGRAM(s) Oral daily  folic acid 1 milliGRAM(s) Oral daily  furosemide    Tablet 60 milliGRAM(s) Oral two times a day  metoprolol tartrate 25 milliGRAM(s) Oral two times a day  pantoprazole    Tablet 40 milliGRAM(s) Oral before breakfast  polyethylene glycol 3350 17 Gram(s) Oral daily    MEDICATIONS  (PRN):  acetaminophen     Tablet .. 650 milliGRAM(s) Oral every 6 hours PRN Mild Pain (1 - 3)  diphenhydrAMINE 25 milliGRAM(s) Oral daily PRN Rash and/or Itching      Allergies  aspirin (Rash; Other)  EKG leads (Hives)  latex (Unknown)  penicillins (Rash; Urticaria; Hives; Blisters)      Review of Systems:   Constitutional:  No Fever, No Chills  ENT/Mouth:  No Hearing Changes   Eyes:  No Eye Pain, No Vision Changes  Cardiovascular:  No Chest Pain, No Palpitations  Respiratory:  No Cough, +Dyspnea  Gastrointestinal:  As described in HPI  Musculoskeletal:  No Joint Swelling, No Back Pain  Skin:  No Skin Lesions, No Jaundice  Neuro:  No Syncope, No Dizziness     Physical Examination:  T(C): 35.9 (03-31-23 @ 07:54), Max: 36.8 (03-30-23 @ 19:41)  HR: 66 (03-31-23 @ 07:54) (66 - 83)  BP: 110/55 (03-31-23 @ 07:54) (99/46 - 115/48)  RR: 19 (03-31-23 @ 07:54) (14 - 20)  SpO2: 98% (03-31-23 @ 02:20) (97% - 99%)  Height (cm): 170.2 (03-31-23 @ 06:28)  Weight (kg): 109.8 (03-31-23 @ 06:28)    Constitutional: No acute distress.  Eyes:. Conjunctivae are clear, Sclera is non-icteric.  Ears Nose and Throat: The external ears are normal appearing,  Oral mucosa is pink and moist.  Respiratory:  No signs of respiratory distress. Lung sounds are clear bilaterally.  Cardiovascular:  systolic murmur   GI: Abdomen is soft, symmetric, and non-tender without distention.   Neuro: AO*3, no asterixis  Skin: No rashes, No Jaundice.    Data: (reviewed by attending)                        6.8    3.49  )-----------( 142      ( 30 Mar 2023 20:39 )             22.4     Hgb Trend:  6.8  03-30-23 @ 20:39      03-30    139  |  101  |  66<HH>  ----------------------------<  101<H>  4.1   |  26  |  2.9<H>    Ca    8.3<L>      30 Mar 2023 20:39    TPro  5.7<L>  /  Alb  3.2<L>  /  TBili  0.7  /  DBili  x   /  AST  12  /  ALT  <5  /  AlkPhos  70  03-30    Liver panel trend:  TBili 0.7   /   AST 12   /   ALT <5   /   AlkP 70   /   Tptn 5.7   /   Alb 3.2    /   DBili --      03-30  TBili 1.0   /   AST 10   /   ALT <5   /   AlkP 83   /   Tptn 5.3   /   Alb 3.0    /   DBili --      03-23  TBili 1.1   /   AST 11   /   ALT 5   /   AlkP 90   /   Tptn 5.3   /   Alb 3.1    /   DBili --      03-22  TBili 1.0   /   AST 11   /   ALT 5   /   AlkP 94   /   Tptn 5.5   /   Alb 3.1    /   DBili --      03-22  TBili 1.1   /   AST 13   /   ALT 6   /   AlkP 98   /   Tptn 5.6   /   Alb 3.3    /   DBili --      03-21

## 2023-03-31 NOTE — H&P ADULT - NSHPLABSRESULTS_GEN_ALL_CORE
6.8    3.49  )-----------( 142      ( 30 Mar 2023 20:39 )             22.4     03-30    139  |  101  |  66<HH>  ----------------------------<  101<H>  4.1   |  26  |  2.9<H>    Ca    8.3<L>      30 Mar 2023 20:39    TPro  5.7<L>  /  Alb  3.2<L>  /  TBili  0.7  /  DBili  x   /  AST  12  /  ALT  <5  /  AlkPhos  70  03-30

## 2023-03-31 NOTE — H&P ADULT - ATTENDING COMMENTS
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 Gaston for PRBC transfusion for hgb 6.8.    #Acute anemia  #Hx of chronic pancytopenia with frequen iron and PRBC transfusions outpt  #Hx of GIB, AVM  Hg 6.8 sp 1U PRBC transfusion  - f/u Hg BID  - Monitor for further bleeding  - If pt agreeable to stay over weekend, can do EGD/Colonoscopy inpatient, otherwise pt to f/u GI outaptient    # Constipation  - Cont laxative  - High fiber diet    # HTN/HLD  #Chronic Diastolic CHF (EF 62 03/22/23) with Severe Aortic Stenosis   - Patient develops bleeding on aspirin  - A1C, Lipid Pofile in AM   - c/w metoprolol 25 BID  - Cont PO lasix 60 BID    #CKD Stage 4  Cr at baseline ~3  - Outpatient nephrology f/u    # Sciatica  - Tylenol prn    # Uterine fibroid/T12 compression deformity/  hx of avascular necrosis of right femoral head (seen on ct scan from 09/2022)   - Outpatient f/u with orthopedic     # Stasis Dermatitis  Pt endorses course of oral abx outpatient for suspected cellulitis  - Silvadine & foot care  - Last duplex on 03/23/23 2023 was -ve.     DVT PPX, held for anemia    #Progress Note Handoff  Pending (specify): Monitor Hg  Family discussion: dw pt regarding eval for anemia  Disposition: Home 77-year-old female w/ history of HTN, HLD, CKD4, Severe AS, mild PHTN, Pancytopenia 2/2 covid?, hx GI bleeding (from Duodenum and Gastric AVM capsule endoscopy 02/2022 and enteroscopy 10/14 found to have 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 Gaston for PRBC transfusion for hgb 6.8.    #Acute anemia  #Hx of chronic pancytopenia with frequen iron and PRBC transfusions outpt  #Hx of GIB, AVM  Hg 6.8 sp 1U PRBC transfusion  - f/u Hg BID  - Monitor for further bleeding  - GI f/u for EGD/Colonoscopy (pt states latest she will stay is Monday)    # Constipation  - Cont laxative  - High fiber diet    # HTN/HLD  #Chronic Diastolic CHF (EF 62 03/22/23) with Severe Aortic Stenosis   - Patient develops bleeding on aspirin  - A1C, Lipid Pofile in AM   - c/w metoprolol 25 BID  - Cont PO lasix 60 BID    #CKD Stage 4  Cr at baseline ~3  - Outpatient nephrology f/u    # Sciatica  - Tylenol prn    # Uterine fibroid/T12 compression deformity/  hx of avascular necrosis of right femoral head (seen on ct scan from 09/2022)   - Outpatient f/u with orthopedic     # Stasis Dermatitis  Pt endorses course of oral abx outpatient for suspected cellulitis  - Silvadine & foot care  - Last duplex on 03/23/23 2023 was -ve.     DVT PPX, held for anemia    #Progress Note Handoff  Pending (specify): Monitor Hg  Family discussion: dw pt regarding eval for anemia  Disposition: Home

## 2023-03-31 NOTE — CONSULT NOTE ADULT - ASSESSMENT
IMPRESSION  Anemia with Hb 6.8, Hx of AVMs  Pancytopenia  Severe AS, Mild Pulm HTN  HTN, HLD  ---------------------------------------------  EKG: Sinus wtih PACs  METS  RCRI = 2 (class III, 10% risk of 30 day MACE)    RECOMMENDATIONS  IMPRESSION  Anemia with Hb 6.8, Hx of AVMs  Pancytopenia  Severe AS, Mild Pulm HTN  HTN, HLD  EVAN on CKD  ---------------------------------------------  EKG: Sinus wtih PACs  METS <4  RCRI = 2 (class III, 10% risk of 30 day MACE)    RECOMMENDATIONS   decrease lasix to 60mg qd for now  c/w metoprolol 25mg BID  pt is at high risk of MACE for low risk procedure however otherwise optimized from cardiac standpoint  avoid fluid overload and monitor preload with severe AS IMPRESSION  Anemia with Hb 6.8, Hx of AVMs  Pancytopenia  Severe AS, Mild Pulm HTN  HFpEF not in exacerbation  HTN, HLD  EVAN on CKD  ---------------------------------------------  EKG: Sinus wtih PACs  METS <4  RCRI = 2 (class III, 10% risk of 30 day MACE)    RECOMMENDATIONS   decrease lasix to 60mg qd for now  c/w metoprolol 25mg BID  pt is at high risk of MACE for low risk procedure however otherwise optimized from cardiac standpoint  avoid fluid overload and monitor preload with severe AS IMPRESSION  Anemia with Hb 6.8, Hx of AVMs, scheduled for EGD/Enteroscopy - Hyede Syndrome  Pancytopenia  Severe AS, Mild Pulm HTN  HFpEF not in exacerbation  HTN, HLD  EVAN on CKD IV (baseline cret 2.7)  ---------------------------------------------  EKG: Sinus wtih PACs  METS <4  RCRI = 2 (class III, 10% risk of 30 day MACE)    RECOMMENDATIONS   decrease lasix to 60mg qd for now  c/w metoprolol 25mg BID  pt was able to tolerate EGD/Push Enteroscopy last year with severe AS when mean gradient was 48.9 however her AS has worsened since then.  TTE 3/22 showed severe AS with mean gradient of 68.  At this point pt is at high risk of MACE for low risk procedure and should have cardiac anesthesiologist for the procedure  pt needs BP monitoring with A line during the procedure.   Monitor for sudden drop in BP for preload dependance   Avoid Fluid overload  consider structural heart team eval once renal function is stable for possible CCTA and BAV  will need nephro eval/clearance for CCTA if renal function does not improve IMPRESSION  Anemia with Hb 6.8, Hx of AVMs, scheduled for EGD/Enteroscopy - Hyede Syndrome  Pancytopenia  Severe AS, Mild Pulm HTN  HFpEF not in exacerbation  HTN, HLD  EVAN on CKD IV (baseline cret 2.7)  ---------------------------------------------  EKG: Sinus wtih PACs  METS <4  RCRI = 2 (class III, 10% risk of 30 day MACE)    RECOMMENDATIONS   decrease lasix to 60mg qd for now  c/w metoprolol 25mg BID  pt was able to tolerate EGD/Push Enteroscopy last year with severe AS when mean gradient was 48.9 however her AS has worsened since then.  TTE 3/22 showed severe AS with mean gradient of 68.  At this point pt is at high risk of MACE for low risk procedure and should have cardiac anesthesiologist for the procedure  pt needs BP monitoring with A line during the procedure.   Monitor for sudden drop in BP for preload dependance   Avoid Fluid overload  Heme eval for pancytopenia  consider structural heart team eval once renal function is stable for possible CCTA and BAV  will need nephro eval/clearance for CCTA if renal function does not improve

## 2023-03-31 NOTE — ED PROVIDER NOTE - NS_EDPROVIDERDISPOUSERTYPE_ED_A_ED
Vital Signs Last 24 Hrs  T(C): 36.9 (24 Nov 2019 10:21), Max: 36.9 (24 Nov 2019 10:21)  T(F): 98.4 (24 Nov 2019 10:21), Max: 98.4 (24 Nov 2019 10:21)  HR: 75 (24 Nov 2019 10:21) (75 - 100)  BP: 108/54 (24 Nov 2019 10:21) (108/54 - 138/85)  BP(mean): --  RR: 14 (24 Nov 2019 10:21) (14 - 15)  SpO2: 100% (24 Nov 2019 10:21) (100% - 100%)    I&O's Detail                            11.1   6.32  )-----------( 217      ( 24 Nov 2019 08:34 )             33.6       11-24    141  |  108<H>  |  7   ----------------------------<  99  3.8   |  18<L>  |  0.72    Ca    8.8      24 Nov 2019 08:34    TPro  6.6  /  Alb  4.4  /  TBili  0.3  /  DBili  x   /  AST  11  /  ALT  7   /  AlkPhos  31<L>  11-24      CAPILLARY BLOOD GLUCOSE          LIVER FUNCTIONS - ( 24 Nov 2019 08:34 )  Alb: 4.4 g/dL / Pro: 6.6 g/dL / ALK PHOS: 31 u/L / ALT: 7 u/L / AST: 11 u/L / GGT: x             PT/INR - ( 24 Nov 2019 09:50 )   PT: 13.4 SEC;   INR: 1.17          PTT - ( 24 Nov 2019 09:50 )  PTT:27.9 SEC
Attending Attestation (For Attendings USE Only)...

## 2023-04-01 LAB
A1C WITH ESTIMATED AVERAGE GLUCOSE RESULT: 4.6 % — SIGNIFICANT CHANGE UP (ref 4–5.6)
ALBUMIN SERPL ELPH-MCNC: 3.2 G/DL — LOW (ref 3.5–5.2)
ALP SERPL-CCNC: 56 U/L — SIGNIFICANT CHANGE UP (ref 30–115)
ALT FLD-CCNC: <5 U/L — SIGNIFICANT CHANGE UP (ref 0–41)
ANION GAP SERPL CALC-SCNC: 13 MMOL/L — SIGNIFICANT CHANGE UP (ref 7–14)
AST SERPL-CCNC: 11 U/L — SIGNIFICANT CHANGE UP (ref 0–41)
BILIRUB SERPL-MCNC: 0.8 MG/DL — SIGNIFICANT CHANGE UP (ref 0.2–1.2)
BLD GP AB SCN SERPL QL: SIGNIFICANT CHANGE UP
BUN SERPL-MCNC: 79 MG/DL — CRITICAL HIGH (ref 10–20)
CALCIUM SERPL-MCNC: 8 MG/DL — LOW (ref 8.4–10.5)
CHLORIDE SERPL-SCNC: 101 MMOL/L — SIGNIFICANT CHANGE UP (ref 98–110)
CHOLEST SERPL-MCNC: 113 MG/DL — SIGNIFICANT CHANGE UP
CO2 SERPL-SCNC: 29 MMOL/L — SIGNIFICANT CHANGE UP (ref 17–32)
CREAT SERPL-MCNC: 3.3 MG/DL — HIGH (ref 0.7–1.5)
EGFR: 14 ML/MIN/1.73M2 — LOW
ESTIMATED AVERAGE GLUCOSE: 85 MG/DL — SIGNIFICANT CHANGE UP (ref 68–114)
GLUCOSE SERPL-MCNC: 99 MG/DL — SIGNIFICANT CHANGE UP (ref 70–99)
HCT VFR BLD CALC: 24.3 % — LOW (ref 37–47)
HDLC SERPL-MCNC: 51 MG/DL — SIGNIFICANT CHANGE UP
HGB BLD-MCNC: 7.5 G/DL — LOW (ref 12–16)
LIPID PNL WITH DIRECT LDL SERPL: 48 MG/DL — SIGNIFICANT CHANGE UP
MAGNESIUM SERPL-MCNC: 2.4 MG/DL — SIGNIFICANT CHANGE UP (ref 1.8–2.4)
MCHC RBC-ENTMCNC: 28 PG — SIGNIFICANT CHANGE UP (ref 27–31)
MCHC RBC-ENTMCNC: 30.9 G/DL — LOW (ref 32–37)
MCV RBC AUTO: 90.7 FL — SIGNIFICANT CHANGE UP (ref 81–99)
NON HDL CHOLESTEROL: 62 MG/DL — SIGNIFICANT CHANGE UP
NRBC # BLD: 0 /100 WBCS — SIGNIFICANT CHANGE UP (ref 0–0)
PHOSPHATE SERPL-MCNC: 4.6 MG/DL — SIGNIFICANT CHANGE UP (ref 2.1–4.9)
PLATELET # BLD AUTO: 151 K/UL — SIGNIFICANT CHANGE UP (ref 130–400)
POTASSIUM SERPL-MCNC: 3.8 MMOL/L — SIGNIFICANT CHANGE UP (ref 3.5–5)
POTASSIUM SERPL-SCNC: 3.8 MMOL/L — SIGNIFICANT CHANGE UP (ref 3.5–5)
PROT SERPL-MCNC: 5.7 G/DL — LOW (ref 6–8)
RBC # BLD: 2.68 M/UL — LOW (ref 4.2–5.4)
RBC # FLD: 16.2 % — HIGH (ref 11.5–14.5)
SODIUM SERPL-SCNC: 143 MMOL/L — SIGNIFICANT CHANGE UP (ref 135–146)
SODIUM UR-SCNC: 26 MMOL/L — SIGNIFICANT CHANGE UP
TRIGL SERPL-MCNC: 75 MG/DL — SIGNIFICANT CHANGE UP
WBC # BLD: 3.87 K/UL — LOW (ref 4.8–10.8)
WBC # FLD AUTO: 3.87 K/UL — LOW (ref 4.8–10.8)

## 2023-04-01 PROCEDURE — 99223 1ST HOSP IP/OBS HIGH 75: CPT

## 2023-04-01 PROCEDURE — 76770 US EXAM ABDO BACK WALL COMP: CPT | Mod: 26

## 2023-04-01 PROCEDURE — 99233 SBSQ HOSP IP/OBS HIGH 50: CPT | Mod: GC

## 2023-04-01 RX ORDER — ERYTHROPOIETIN 10000 [IU]/ML
30000 INJECTION, SOLUTION INTRAVENOUS; SUBCUTANEOUS
Refills: 0 | Status: DISCONTINUED | OUTPATIENT
Start: 2023-04-01 | End: 2023-04-03

## 2023-04-01 RX ADMIN — Medication 25 MILLIGRAM(S): at 05:37

## 2023-04-01 RX ADMIN — PANTOPRAZOLE SODIUM 40 MILLIGRAM(S): 20 TABLET, DELAYED RELEASE ORAL at 05:36

## 2023-04-01 RX ADMIN — Medication 1 MILLIGRAM(S): at 12:00

## 2023-04-01 RX ADMIN — Medication 60 MILLIGRAM(S): at 05:36

## 2023-04-01 RX ADMIN — POLYETHYLENE GLYCOL 3350 17 GRAM(S): 17 POWDER, FOR SOLUTION ORAL at 12:00

## 2023-04-01 NOTE — GOALS OF CARE CONVERSATION - ADVANCED CARE PLANNING - CONVERSATION DETAILS
During my conversation with the pt, she said she wasnted to be DNR/DNI since the admission. I discussed MOLST with her, and she want to be DNR and DNI. MOLST form filled and placed in the chart. Attending will need to sign the form. During my conversation with the pt, she said she wanted to be DNR/DNI since the admission. I discussed MOLST with her, and she want to be DNR and DNI. MOLST form filled and placed in the chart.

## 2023-04-01 NOTE — PROGRESS NOTE ADULT - ATTENDING COMMENTS
IM ATTENDING NOTE:    77-year-old female w/ history of HTN, HLD, CKD4, Severe AS, mild PHTN, Pancytopenia 2/2 covid?, hx GI bleeding (from Duodenum and Gastric AVM capsule endoscopy 02/2022 and enteroscopy 10/14 found to have 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 Gaston for PRBC transfusion for hgb 6.8.    Subjective: She denied any complaints today. Received 1U PRBC yesterday.     #Acute on chronic anemia with frequent iron and PRBC transfusions outpt  #Hx of intermittent chronic pancytopenia ?covid r/o other etiology  #Hx of GIB, AVM s/p APC  - Peripheral flow cytometry 1/9/23: Normal; Iron studies (03.22.23): TIBC 279 ug/dL; %Sat 12%; Fe serum 33; Ferritin 35; B12 527; Folate 17; ; LDh 149; Haptoglobin 70  - Hg 6.8 sp 1U PRBC transfusion  - f/u Hg BID  - Monitor for further bleeding  - GI f/u for EGD/Colonoscopy : plan for push enteroscopy on Monday (pt not willing to drink golytely for colonoscopy)  - Hematology eval for pancytopenia per GI.   - Start Retacrit 95852D Weekly; c/w FeSO4 daily     # Constipation  - Cont laxative  - High fiber diet    # HTN/HLD  #Chronic Diastolic CHF (EF 62 03/22/23) with Severe Aortic Stenosis   - Patient develops bleeding on aspirin  - A1C, Lipid Pofile in AM   - c/w metoprolol 25 BID  - Cont PO lasix 60 BID    #CKD Stage 4  Cr at baseline ~3  - Outpatient nephrology f/u    # Sciatica  - Tylenol prn    # Uterine fibroid/T12 compression deformity/  hx of avascular necrosis of right femoral head (seen on ct scan from 09/2022)   - Outpatient f/u with orthopedic     # Stasis Dermatitis  Pt endorses course of oral abx outpatient for suspected cellulitis  - Silvadine & foot care  - Last duplex on 03/23/23 2023 was -ve.     DVT PPX, held for anemia    #Progress Note Handoff  Pending (specify): Monitor Hg; Push enteroscopy on Monday 4/3, hematology eval.   Family discussion: dw pt regarding eval for anemia  Disposition: Home.

## 2023-04-02 LAB
ALBUMIN SERPL ELPH-MCNC: 3 G/DL — LOW (ref 3.5–5.2)
ALBUMIN SERPL ELPH-MCNC: 3.3 G/DL — LOW (ref 3.5–5.2)
ALP SERPL-CCNC: 56 U/L — SIGNIFICANT CHANGE UP (ref 30–115)
ALP SERPL-CCNC: 66 U/L — SIGNIFICANT CHANGE UP (ref 30–115)
ALT FLD-CCNC: 8 U/L — SIGNIFICANT CHANGE UP (ref 0–41)
ALT FLD-CCNC: <5 U/L — SIGNIFICANT CHANGE UP (ref 0–41)
ANION GAP SERPL CALC-SCNC: 14 MMOL/L — SIGNIFICANT CHANGE UP (ref 7–14)
ANION GAP SERPL CALC-SCNC: 14 MMOL/L — SIGNIFICANT CHANGE UP (ref 7–14)
APPEARANCE UR: CLEAR — SIGNIFICANT CHANGE UP
APTT BLD: 32.2 SEC — SIGNIFICANT CHANGE UP (ref 27–39.2)
AST SERPL-CCNC: 11 U/L — SIGNIFICANT CHANGE UP (ref 0–41)
AST SERPL-CCNC: 18 U/L — SIGNIFICANT CHANGE UP (ref 0–41)
BILIRUB SERPL-MCNC: 0.4 MG/DL — SIGNIFICANT CHANGE UP (ref 0.2–1.2)
BILIRUB SERPL-MCNC: 0.5 MG/DL — SIGNIFICANT CHANGE UP (ref 0.2–1.2)
BILIRUB UR-MCNC: NEGATIVE — SIGNIFICANT CHANGE UP
BUN SERPL-MCNC: 88 MG/DL — CRITICAL HIGH (ref 10–20)
BUN SERPL-MCNC: 88 MG/DL — CRITICAL HIGH (ref 10–20)
CALCIUM SERPL-MCNC: 8.2 MG/DL — LOW (ref 8.4–10.5)
CALCIUM SERPL-MCNC: 8.4 MG/DL — SIGNIFICANT CHANGE UP (ref 8.4–10.4)
CHLORIDE SERPL-SCNC: 103 MMOL/L — SIGNIFICANT CHANGE UP (ref 98–110)
CHLORIDE SERPL-SCNC: 105 MMOL/L — SIGNIFICANT CHANGE UP (ref 98–110)
CO2 SERPL-SCNC: 24 MMOL/L — SIGNIFICANT CHANGE UP (ref 17–32)
CO2 SERPL-SCNC: 26 MMOL/L — SIGNIFICANT CHANGE UP (ref 17–32)
COLOR SPEC: SIGNIFICANT CHANGE UP
CREAT ?TM UR-MCNC: 88 MG/DL — SIGNIFICANT CHANGE UP
CREAT SERPL-MCNC: 3.2 MG/DL — HIGH (ref 0.7–1.5)
CREAT SERPL-MCNC: 3.7 MG/DL — HIGH (ref 0.7–1.5)
DIFF PNL FLD: NEGATIVE — SIGNIFICANT CHANGE UP
EGFR: 12 ML/MIN/1.73M2 — LOW
EGFR: 14 ML/MIN/1.73M2 — LOW
GLUCOSE SERPL-MCNC: 181 MG/DL — HIGH (ref 70–99)
GLUCOSE SERPL-MCNC: 85 MG/DL — SIGNIFICANT CHANGE UP (ref 70–99)
GLUCOSE UR QL: NEGATIVE — SIGNIFICANT CHANGE UP
HCT VFR BLD CALC: 22.8 % — LOW (ref 37–47)
HCT VFR BLD CALC: 26.6 % — LOW (ref 37–47)
HGB BLD-MCNC: 6.9 G/DL — CRITICAL LOW (ref 12–16)
HGB BLD-MCNC: 8.3 G/DL — LOW (ref 12–16)
INR BLD: 0.99 RATIO — SIGNIFICANT CHANGE UP (ref 0.65–1.3)
KETONES UR-MCNC: NEGATIVE — SIGNIFICANT CHANGE UP
LEUKOCYTE ESTERASE UR-ACNC: NEGATIVE — SIGNIFICANT CHANGE UP
MAGNESIUM SERPL-MCNC: 2.4 MG/DL — SIGNIFICANT CHANGE UP (ref 1.8–2.4)
MAGNESIUM SERPL-MCNC: 2.4 MG/DL — SIGNIFICANT CHANGE UP (ref 1.8–2.4)
MCHC RBC-ENTMCNC: 27.3 PG — SIGNIFICANT CHANGE UP (ref 27–31)
MCHC RBC-ENTMCNC: 28.3 PG — SIGNIFICANT CHANGE UP (ref 27–31)
MCHC RBC-ENTMCNC: 30.3 G/DL — LOW (ref 32–37)
MCHC RBC-ENTMCNC: 31.2 G/DL — LOW (ref 32–37)
MCV RBC AUTO: 90.1 FL — SIGNIFICANT CHANGE UP (ref 81–99)
MCV RBC AUTO: 90.8 FL — SIGNIFICANT CHANGE UP (ref 81–99)
NITRITE UR-MCNC: NEGATIVE — SIGNIFICANT CHANGE UP
NRBC # BLD: 0 /100 WBCS — SIGNIFICANT CHANGE UP (ref 0–0)
NRBC # BLD: 0 /100 WBCS — SIGNIFICANT CHANGE UP (ref 0–0)
OSMOLALITY UR: 440 MOS/KG — SIGNIFICANT CHANGE UP (ref 50–1200)
PH UR: 5 — SIGNIFICANT CHANGE UP (ref 5–8)
PLATELET # BLD AUTO: 156 K/UL — SIGNIFICANT CHANGE UP (ref 130–400)
PLATELET # BLD AUTO: 158 K/UL — SIGNIFICANT CHANGE UP (ref 130–400)
POTASSIUM SERPL-MCNC: 4.2 MMOL/L — SIGNIFICANT CHANGE UP (ref 3.5–5)
POTASSIUM SERPL-MCNC: 4.5 MMOL/L — SIGNIFICANT CHANGE UP (ref 3.5–5)
POTASSIUM SERPL-SCNC: 4.2 MMOL/L — SIGNIFICANT CHANGE UP (ref 3.5–5)
POTASSIUM SERPL-SCNC: 4.5 MMOL/L — SIGNIFICANT CHANGE UP (ref 3.5–5)
PROT ?TM UR-MCNC: 5 MG/DLG/24H — SIGNIFICANT CHANGE UP
PROT SERPL-MCNC: 5.4 G/DL — LOW (ref 6–8)
PROT SERPL-MCNC: 5.8 G/DL — LOW (ref 6–8)
PROT UR-MCNC: NEGATIVE — SIGNIFICANT CHANGE UP
PROT/CREAT UR-RTO: 0.1 RATIO — SIGNIFICANT CHANGE UP (ref 0–0.2)
PROTHROM AB SERPL-ACNC: 11.3 SEC — SIGNIFICANT CHANGE UP (ref 9.95–12.87)
RBC # BLD: 2.53 M/UL — LOW (ref 4.2–5.4)
RBC # BLD: 2.93 M/UL — LOW (ref 4.2–5.4)
RBC # FLD: 15.6 % — HIGH (ref 11.5–14.5)
RBC # FLD: 15.9 % — HIGH (ref 11.5–14.5)
SODIUM SERPL-SCNC: 143 MMOL/L — SIGNIFICANT CHANGE UP (ref 135–146)
SODIUM SERPL-SCNC: 143 MMOL/L — SIGNIFICANT CHANGE UP (ref 135–146)
SP GR SPEC: 1.01 — SIGNIFICANT CHANGE UP (ref 1.01–1.03)
UROBILINOGEN FLD QL: SIGNIFICANT CHANGE UP
UUN UR-MCNC: 776 MG/DL — SIGNIFICANT CHANGE UP
WBC # BLD: 1.9 K/UL — LOW (ref 4.8–10.8)
WBC # BLD: 3.13 K/UL — LOW (ref 4.8–10.8)
WBC # FLD AUTO: 1.9 K/UL — LOW (ref 4.8–10.8)
WBC # FLD AUTO: 3.13 K/UL — LOW (ref 4.8–10.8)

## 2023-04-02 PROCEDURE — 99233 SBSQ HOSP IP/OBS HIGH 50: CPT

## 2023-04-02 PROCEDURE — 99497 ADVNCD CARE PLAN 30 MIN: CPT | Mod: 25

## 2023-04-02 RX ORDER — ACETAMINOPHEN 500 MG
650 TABLET ORAL ONCE
Refills: 0 | Status: COMPLETED | OUTPATIENT
Start: 2023-04-02 | End: 2023-04-02

## 2023-04-02 RX ADMIN — Medication 1 MILLIGRAM(S): at 14:43

## 2023-04-02 RX ADMIN — Medication 650 MILLIGRAM(S): at 11:44

## 2023-04-02 RX ADMIN — PANTOPRAZOLE SODIUM 40 MILLIGRAM(S): 20 TABLET, DELAYED RELEASE ORAL at 05:39

## 2023-04-02 RX ADMIN — Medication 25 MILLIGRAM(S): at 18:32

## 2023-04-02 RX ADMIN — Medication 25 MILLIGRAM(S): at 10:48

## 2023-04-02 RX ADMIN — Medication 40 MILLIGRAM(S): at 10:48

## 2023-04-02 RX ADMIN — Medication 650 MILLIGRAM(S): at 10:48

## 2023-04-02 RX ADMIN — POLYETHYLENE GLYCOL 3350 17 GRAM(S): 17 POWDER, FOR SOLUTION ORAL at 14:43

## 2023-04-02 RX ADMIN — ERYTHROPOIETIN 30000 UNIT(S): 10000 INJECTION, SOLUTION INTRAVENOUS; SUBCUTANEOUS at 14:44

## 2023-04-03 ENCOUNTER — TRANSCRIPTION ENCOUNTER (OUTPATIENT)
Age: 78
End: 2023-04-03

## 2023-04-03 ENCOUNTER — RESULT REVIEW (OUTPATIENT)
Age: 78
End: 2023-04-03

## 2023-04-03 VITALS
HEART RATE: 61 BPM | DIASTOLIC BLOOD PRESSURE: 56 MMHG | RESPIRATION RATE: 18 BRPM | TEMPERATURE: 97 F | SYSTOLIC BLOOD PRESSURE: 114 MMHG

## 2023-04-03 PROCEDURE — 44361 SMALL BOWEL ENDOSCOPY/BIOPSY: CPT

## 2023-04-03 PROCEDURE — 99233 SBSQ HOSP IP/OBS HIGH 50: CPT

## 2023-04-03 PROCEDURE — 44366 SMALL BOWEL ENDOSCOPY: CPT | Mod: XU

## 2023-04-03 PROCEDURE — 88305 TISSUE EXAM BY PATHOLOGIST: CPT | Mod: 26

## 2023-04-03 RX ORDER — IRON SUCROSE 20 MG/ML
200 INJECTION, SOLUTION INTRAVENOUS ONCE
Refills: 0 | Status: DISCONTINUED | OUTPATIENT
Start: 2023-04-03 | End: 2023-04-03

## 2023-04-03 RX ORDER — IRON SUCROSE 20 MG/ML
200 INJECTION, SOLUTION INTRAVENOUS EVERY 24 HOURS
Refills: 0 | Status: DISCONTINUED | OUTPATIENT
Start: 2023-04-04 | End: 2023-04-03

## 2023-04-03 RX ORDER — IRON SUCROSE 20 MG/ML
200 INJECTION, SOLUTION INTRAVENOUS ONCE
Refills: 0 | Status: COMPLETED | OUTPATIENT
Start: 2023-04-03 | End: 2023-04-03

## 2023-04-03 RX ADMIN — IRON SUCROSE 110 MILLIGRAM(S): 20 INJECTION, SOLUTION INTRAVENOUS at 14:45

## 2023-04-03 RX ADMIN — Medication 1 MILLIGRAM(S): at 11:58

## 2023-04-03 RX ADMIN — Medication 300 UNIT(S): at 17:30

## 2023-04-03 RX ADMIN — Medication 25 MILLIGRAM(S): at 05:07

## 2023-04-03 RX ADMIN — PANTOPRAZOLE SODIUM 40 MILLIGRAM(S): 20 TABLET, DELAYED RELEASE ORAL at 05:08

## 2023-04-03 NOTE — DISCHARGE NOTE PROVIDER - NSDCFUSCHEDAPPT_GEN_ALL_CORE_FT
Liu Gaston  [facility] PreAdmits  Scheduled Appointment: 04/07/2023    [facility]  AMBCHEMO SI [address]  Scheduled Appointment: 04/07/2023    Liu Gaston  [facility] PreAdmits  Scheduled Appointment: 04/13/2023    Liu Gaston  [facility]  HEMONC SI [address]  Scheduled Appointment: 04/13/2023    David Eisenberg  [facility]  OTOLARYNG [address]  Scheduled Appointment: 05/04/2023

## 2023-04-03 NOTE — CHART NOTE - NSCHARTNOTEFT_GEN_A_CORE
PACU ANESTHESIA ADMISSION NOTE      Procedure: EGD  Post op diagnosis:  anemia    __x__  Patent Airway    __x__  Full return of protective reflexes    ____  Full recovery from anesthesia / back to baseline     Vitals:   see anesthesia record      Mental Status:  ___x_ Awake   _____ Alert   _____ Drowsy   _____ Sedated    Nausea/Vomiting:  __x__ NO  ______Yes,   See Post - Op Orders          Pain Scale (0-10):  _____    Treatment: ____ None    ___x_ See Post - Op/PCA Orders    Post - Operative Fluids:   ____ Oral   __x__ See Post - Op Orders    Plan: Discharge:   __x__Home       _____Floor     _____Critical Care    _____  Other:_________________    Comments:  Uneventful intraoperative course. No anesthesia issues or complications noted. Patient stable upon arrival to PACU. Report given to RN. Discharge when criteria met.

## 2023-04-03 NOTE — DISCHARGE NOTE PROVIDER - NSDCFUADDINST_GEN_ALL_CORE_FT
The patient is clinically sober, AAOx3, free from distracting injury.  Throughout our interactions today, the patient has demonstrated concrete thinking/reasoning, has maintained an orderly/reasonable conversation, appears to have intact insight/judgment/reason and therefore in our opinion has capacity to make decisions. The patient verbalized an understanding of our worries. We’ve told the patient that the hospital evaluation is incomplete & many troublesome conditions haven’t  been ruled out. We have discussed the need for further inpatient workup so we can get more information. We have discussed the range of possible diagnosis, potential testing & treatment options. We’ve made  numerous efforts to prevent the patient from leaving Against Medical Advice.  Our discussions included the potential outcomes of leaving AMA, including worsening of their condition, becoming permanently disabled/in pain/critically ill, or death.  Despite these efforts, we were unable to convince the patient to stay. The patient is refusing any  further care and is leaving against medical advice. We have attempted to offer treatment/diagnosis/guidance for any dangerous conditions which are most likely and/or dangerous.  We have answered all questions and have implored the patient to return ASA to complete the workup

## 2023-04-03 NOTE — DISCHARGE NOTE NURSING/CASE MANAGEMENT/SOCIAL WORK - PATIENT PORTAL LINK FT
You can access the FollowMyHealth Patient Portal offered by [facility] by registering at the following website: [email or web address]. By joining [facility]s FollowMyHealth portal, you will also be able to view your health information using other applications (apps) compatible with our system.

## 2023-04-03 NOTE — DISCHARGE NOTE PROVIDER - CARE PROVIDERS DIRECT ADDRESSES
[email or web address]

## 2023-04-03 NOTE — DISCHARGE NOTE PROVIDER - NSDCCPCAREPLAN_GEN_ALL_CORE_FT
PRINCIPAL DISCHARGE DIAGNOSIS  Diagnosis: GI bleed  Assessment and Plan of Treatment: Lower gastrointestinal (GI) bleeding is bleeding from the colon, the rectum, or the anus. The colon is the last part of the digestive tract, where stool (feces) is formed. A person with lower GI bleeding may see blood in the stool. The blood may be bright red.  Lower GI bleeding often stops without treatment. Heavy bleeding and bleeding that does not go away may be life-threatening and may need emergency treatment at a hospital.  What are the causes?  This condition may be caused by:  Diverticulosis. This condition causes pouches to form in your colon over time.  Diverticulitis. This is inflammation in areas where diverticulosis has occurred.  Inflammatory bowel disease (IBD), or inflammation of the colon.  Hemorrhoids, or swollen veins in the rectum.  Anal fissures, or painful tears in the anus. Tears are often caused by passing hard stools.  Cancer of the colon or rectum, or polyps of the colon or rectum. Polyps are noncancerous growths.  Coagulopathy. This is a disorder that makes it hard to form blood clots. This condition also causes easy bleeding.  Arteriovenous malformation. This is an abnormal weakening of a blood vessel where an artery and a vein come together.        SECONDARY DISCHARGE DIAGNOSES  Diagnosis: Anemia  Assessment and Plan of Treatment: Anemia is a condition in which the concentration of red blood cells or hemoglobin in the blood is below normal. Hemoglobin is a substance in red blood cells that carries oxygen to the tissues of the body. Anemia results in not enough oxygen reaching these tissues which can cause symptoms such as weakness, dizziness/lightheadedness, shortness of breath, chest pain, paleness, or nausea. The cause of your anemia may or may not be determined immediately. If your hemoglobin was dangerously low, you may have received a blood transfusion. Usually reactions to transfusions occur immediately but monitor yourself for any fevers, rash, or shortness of breath.  SEEK IMMEDIATE MEDICAL CARE IF YOU HAVE ANY OF THE FOLLOWING SYMPTOMS: extreme weakness/chest pain/shortness of breath, black or bloody stools, vomiting blood, fainting, fever, or any signs of dehydration.      Diagnosis: Left against medical advice  Assessment and Plan of Treatment: The patient is clinically sober, AAOx3, free from distracting injury.  Throughout our interactions in today, the patient has demonstrated concrete thinking/reasoning, has maintained an orderly/reasonable conversation, appears to have intact insight/judgment/reason and therefore in our opinion has capacity to make decisions. The patient verbalized an understanding of our worries. We’ve told the patient that the hospital evaluation is incomplete & many troublesome conditions haven’t  been ruled out. We have discussed the need for further inpatient workup so we can get more information. We have discussed the range of possible diagnosis, potential testing & treatment options. We’ve made  numerous efforts to prevent the patient from leaving Against Medical Advice.  Our discussions included the potential outcomes of leaving AMA, including worsening of their condition, becoming permanently disabled/in pain/critically ill, or death.  Despite these efforts, we were unable to convince the patient to stay. The patient is refusing any  further care and is leaving against medical advice. We have attempted to offer treatment/diagnosis/guidance for any dangerous conditions which are most likely and/or dangerous.  We have answered all questions and have implored the patient to return ASAP to complete the workup  Patient aware that the primary team desires to monitor her for another night, as well as follow up on the procedural note and GI recommendations. Patient is aware of the risks of leaving regardless of the above and is leaving AMA.

## 2023-04-03 NOTE — DISCHARGE NOTE PROVIDER - NSDCMRMEDTOKEN_GEN_ALL_CORE_FT
Benadryl 25 mg oral capsule: 1 cap(s) orally once a day (at bedtime), As Needed  ferrous sulfate 200 mg (65 mg elemental iron) oral tablet: 1 tab(s) orally once a day   folic acid 1 mg oral tablet: 1 tab(s) orally once a day  furosemide 20 mg oral tablet: 3 tab(s) orally 2 times a day   metoprolol tartrate 25 mg oral tablet: 1 tab(s) orally 2 times a day  MiraLax oral powder for reconstitution: 17 gram(s) orally once a day  pantoprazole 40 mg oral delayed release tablet: 1 tab(s) orally 2 times a day  Retacrit 20,000 units/mL injectable solution: 31364 unit(s) injectable once a week

## 2023-04-03 NOTE — CHART NOTE - NSCHARTNOTEFT_GEN_A_CORE
Called to bedside by RN for patient wanting to be discharged. Was informed primary team wanted to monitor her for longer in the hospital. Upon chart review, see patient is currently POD-0 s/p push enteroscopy with GI for acute on chronic anemia. Informed patient team is still awaiting GI recommendations, wanting to advance her diet, and monitor her blood levels. Patient is aware of the risk of leaving early. She is AAOx3, in NAD. Currently HDS. Patient is leaving AGAINST MEDICAL ADVICE.

## 2023-04-03 NOTE — DISCHARGE NOTE PROVIDER - INSTRUCTIONS
You were last on a clear liquid diet in the hospital with the hopes of advancing under supervision. Please follow up with your healthcare team for further managament.

## 2023-04-03 NOTE — CONSULT NOTE ADULT - SUBJECTIVE AND OBJECTIVE BOX
Patient is a 77y old  Female who presents with a chief complaint of Low Hgb As OP (2023 10:10)      HPI:  Patient is a 77-year-old female w/ history of HTN, HLD, CKD4, Severe AS, mild PHTN, Pancytopenia 2/2 covid?, hx GI bleeding (from Duodenum and Gastric AVM capsule endoscopy 2022 and enteroscopy 10/14 found to have multiple AVMS in duodenum, gastric antral vascular ectasia, Mod diverticulosis), hx DARRIN (follows with Dr Gaston) w/ frequent iron and PRBC transfusions, recent hospital discharge on 23 for HFpEF exacerbation, presents to the ED by Dr. Gaston for hgb 6.8. Heme/Onc was consulted for pancytopenia. Patient denies fever, chills, CP, palpitations, nausea, vomiting, abdominal pain, diarrhea, dysuria. ED labs significant for WBC 3.49, Hgb 6.8, , BUN 66, Cr 2.9 (BL Cr 2-3), eGFR 16. Patient received 2 units of pRBC.      PAST MEDICAL & SURGICAL HISTORY:  HTN (hypertension)  Heart murmur  Eczema  Anemia  iron infusions and PRBC transfusions  Aortic stenosis  Chronic kidney disease (CKD)   novel coronavirus disease (COVID-19)  2020- REHAB admission  H/O appendicitis  H/O cholecystitis  s/p cholecystectomy          SOCIAL HISTORY:    FAMILY HISTORY:  FH: kidney disease (Father)      Allergies    aspirin (Rash; Other)  EKG leads (Hives)  latex (Unknown)  penicillins (Rash; Urticaria; Hives; Blisters)    Intolerances      ROS:  Negative except as stated in HPI        Height (cm): 170.2 (23 @ 09:19)  Weight (kg): 106.1 (23 @ 09:19)  BMI (kg/m2): 36.6 (23 @ 09:19)  BSA (m2): 2.16 (23 @ 09:19)      HOME MEDICATIONS:  Benadryl 25 mg oral capsule: 1 cap(s) orally once a day (at bedtime), As Needed (21 Mar 2023 23:37)  folic acid 1 mg oral tablet: 1 tab(s) orally once a day (21 Mar 2023 23:37)  metoprolol tartrate 25 mg oral tablet: 1 tab(s) orally 2 times a day (21 Mar 2023 23:37)  MiraLax oral powder for reconstitution: 17 gram(s) orally once a day (21 Mar 2023 23:37)  Retacrit 20,000 units/mL injectable solution: 63446 unit(s) injectable once a week (21 Mar 2023 23:37)      Vital Signs Last 24 Hrs  T(C): 36.1 (2023 11:17), Max: 36.3 (2023 15:35)  T(F): 97 (2023 11:17), Max: 97.4 (2023 15:35)  HR: 66 (2023 11:32) (62 - 77)  BP: 112/53 (2023 11:32) (102/49 - 124/56)  BP(mean): --  RR: 20 (2023 11:32) (17 - 20)  SpO2: 99% (2023 11:32) (95% - 99%)    Parameters below as of 2023 11:27  Patient On (Oxygen Delivery Method): room air        PHYSICAL EXAM  General: adult in NAD  HEENT: clear oropharynx, anicteric sclera, pink conjunctiva  Neck: supple  CV: normal S1/S2 with no murmur rubs or gallops  Lungs: positive air movement b/l ant lungs,clear to auscultation, no wheezes, no rales  Abdomen: soft non-tender non-distended, no hepatosplenomegaly  Ext: no clubbing cyanosis or edema  Skin: no rashes and no petechiae  Neuro: alert and oriented X 4, no focal deficits    MEDICATIONS  (STANDING):  epoetin raquel-epbx (RETACRIT) Injectable 48688 Unit(s) SubCutaneous every 7 days  ferrous    sulfate 325 milliGRAM(s) Oral daily  folic acid 1 milliGRAM(s) Oral daily  metoprolol tartrate 25 milliGRAM(s) Oral two times a day  pantoprazole    Tablet 40 milliGRAM(s) Oral before breakfast  polyethylene glycol 3350 17 Gram(s) Oral daily    MEDICATIONS  (PRN):  acetaminophen     Tablet .. 650 milliGRAM(s) Oral every 6 hours PRN Mild Pain (1 - 3)  diphenhydrAMINE 25 milliGRAM(s) Oral daily PRN Rash and/or Itching      LABS:                          8.3    1.90  )-----------( 158      ( 2023 20:07 )             26.6         Mean Cell Volume : 90.8 fL  Mean Cell Hemoglobin : 28.3 pg  Mean Cell Hemoglobin Concentration : 31.2 g/dL  Auto Neutrophil # : x  Auto Lymphocyte # : x  Auto Monocyte # : x  Auto Eosinophil # : x  Auto Basophil # : x  Auto Neutrophil % : x  Auto Lymphocyte % : x  Auto Monocyte % : x  Auto Eosinophil % : x  Auto Basophil % : x      Serial CBC's   @ 20:07  Hct-26.6 / Hgb-8.3 / Plat-158 / RBC-2.93 / WBC-1.90  Serial CBC's   @ 05:52  Hct-22.8 / Hgb-6.9 / Plat-156 / RBC-2.53 / WBC-3.13  Serial CBC's   @ 11:14  Hct-24.3 / Hgb-7.5 / Plat-151 / RBC-2.68 / WBC-3.87  Serial CBC's   @ 14:27  Hct-22.9 / Hgb-7.3 / Plat-129 / RBC-2.59 / WBC-1.91  Serial CBC's   @ 20:39  Hct-22.4 / Hgb-6.8 / Plat-142 / RBC-2.48 / WBC-3.49          143  |  105  |  88<HH>  ----------------------------<  181<H>  4.5   |  24  |  3.2<H>    Ca    8.4      2023 20:07  Mg     2.4     04-    TPro  5.8<L>  /  Alb  3.3<L>  /  TBili  0.5  /  DBili  x   /  AST  18  /  ALT  8   /  AlkPhos  66  04-02      PT/INR - ( 2023 20:07 )   PT: 11.30 sec;   INR: 0.99 ratio         PTT - ( 2023 20:07 )  PTT:32.2 sec        Urinalysis Basic - ( 2023 23:46 )    Color: Light Yellow / Appearance: Clear / S.015 / pH: x  Gluc: x / Ketone: Negative  / Bili: Negative / Urobili: <2 mg/dL   Blood: x / Protein: Negative / Nitrite: Negative   Leuk Esterase: Negative / RBC: x / WBC x   Sq Epi: x / Non Sq Epi: x / Bacteria: x

## 2023-04-03 NOTE — PROGRESS NOTE ADULT - ASSESSMENT
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 Gaston for EVAN. Patient denies fever, chills, CP, palpitations, nausea, vomiting, abdominal pain, diarrhea, dysuria.     # Acute on chronic anemia  # Chronic Pancytopenia w/ frequent iron and PRBC transfusions  # hx GI bleeding Duodenum and Gastric AVM  # suspected folic acid deficiency - on supplement   - capsule endoscopy 02/2022 and enteroscopy 10/14, Multiple AVMs in duodenum. Gastric antral vascular ectasia  - Hgb 6.8 on admission - transfused two units  - heme/onc cs appreciated - start IV venofer x 2 doses   - push enteroscopy scheduled for today  - NPO after midnight  - on retacrit injections q weekly   - oral iron as outpatient     #EVAN on CKD IV - improving  - baseline cr 2.4  - renal/bladder sono reviewed   - avoid nephrotoxics  - follow up urine lytes    # Chronic Diastolic CHF (EF 62 03/22/23) with Severe Aortic Stenosis   - diuresis on hold given EVAN  - cardio eval appreciated   - follow up structural Cardiology team- Dr. Hernandez as outpatient     # HTN/HLD  - Patient develops bleeding on aspirin  - Planned valve replacement  - A1C = 4.6  - Lipid Pofile reviewed   - c/w metoprolol    #Sciatica  #T12 compression deformity  #hx of avascular necrosis of right femoral head (seen on ct scan from 09/2022)   - Outpatient orthopedic follow up   - pain control with Tylenol     #obesity  -diet and lifestyle modification     GOC discussed - DNR/DNI    Progress Note Handoff  Pending Consults: none  Pending Tests: labs  Pending Results: labs  Family Discussion: discussed medication, labs, transfusion, consults and push enteroscopy with pt and medical staff. All questions answered.   Disposition: Home__x___/SNF______/Other_____/Unknown at this time_____  Spent over 55 min reviewing chart, speaking with patient/family and on coordinating patient care during interdisciplinary rounds     Please call me with any questions at extension [phone]
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 Gaston for EVAN. Patient denies fever, chills, CP, palpitations, nausea, vomiting, abdominal pain, diarrhea, dysuria.     #Acute on chronic anemia  #Chronic Pancytopenia w/ frequent iron and PRBC transfusions  # hx GI bleeding Duodenum and Gastric AVM  # suspected folic acid deficiency - on supplement   - capsule endoscopy 02/2022 and enteroscopy 10/14, Multiple AVMs in duodenum. Gastric antral vascular ectasia  - Hgb 6.8 on admission - transfused one unit  - hgb 6.9 today - transfuse another unit - pt asking to be pre-treated   - heme/onc cs ordered   - push enteroscopy scheduled for tomorrow  - NPO after midnight  - preop labs ordered for 8pm  - on retacrit injections   - oral iron     #EVAN on CKD IV  - baseline cr 2.4  - follow up renal/bladder sono  - avoid nephrotoxics  - follow up rine lytes    # Chronic Diastolic CHF (EF 62 03/22/23) with Severe Aortic Stenosis   - diuresis on hold given EVAN  - cardio eval appreciated   - consult structural Cardiology team- Dr. Hernandez on Monday     # HTN/HLD  - Patient develops bleeding on aspirin  - Planned valve replacement  - follow up A1C, Lipid Pofile   - c/w metoprolol    # Sciatica  #T12 compression deformity  #hx of avascular necrosis of right femoral head (seen on ct scan from 09/2022)   - Outpatient orthopedic follow up   - pain control with tylenol     #obesity  -diet and lifestyle modification     GOC discussed - DNR/DNI    Progress Note Handoff  Pending Consults: hematology  Pending Tests: labs  Pending Results: labs  Family Discussion: discussed medication, labs, transfusion, consults and push enteroscopy in am with pt and medical staff. All questions answered.   Disposition: Home__x___/SNF______/Other_____/Unknown at this time_____  Spent over 60 min reviewing chart, speaking with patient/family and on coordinating patient care during interdisciplinary rounds     Please call me with any questions at extension [phone]
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 Gaston for EVAN. Patient denies fever, chills, CP, palpitations, nausea, vomiting, abdominal pain, diarrhea, dysuria.     #Acute on chronic anemia  #Chronic Pancytopenia w/ frequent iron and PRBC transfusions  # hx GI bleeding Duodenum and Gastric AVM  # suspected folic acid deficiency - on supplement   - capsule endoscopy 02/2022 and enteroscopy 10/14, Multiple AVMs in duodenum. Gastric antral vascular ectasia  - Hgb 6.8 on admission - transfused one unit  - hgb 6.9 today - transfuse another unit - pt asking to be pre-treated   - heme/onc cs ordered   - push enteroscopy scheduled for tomorrow  - NPO after midnight  - preop labs ordered for 8pm  - on retacrit injections   - oral iron     #EVAN on CKD IV  - baseline cr 2.4  - follow up renal/bladder sono  - avoid nephrotoxics  - follow up archiee lymyrna    # Chronic Diastolic CHF (EF 62 03/22/23) with Severe Aortic Stenosis   - diuresis on hold given EVAN  - cardio eval appreciated   - consult structural Cardiology team- Dr. Hernandez on Monday     # HTN/HLD  - Patient develops bleeding on aspirin  - Planned valve replacement  - follow up A1C, Lipid Pofile   - c/w metoprolol    # Sciatica  #T12 compression deformity  #hx of avascular necrosis of right femoral head (seen on ct scan from 09/2022)   - Outpatient orthopedic follow up   - pain control with tylenol     #obesity  -diet and lifestyle modification     DNR/DNI

## 2023-04-03 NOTE — DISCHARGE NOTE PROVIDER - HOSPITAL COURSE
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 Gaston for PRBC transfusion for hgb 6.8. Patient denies fever, chills, CP, palpitations, nausea, vomiting, abdominal pain, diarrhea, dysuria.     In the ER:  VS: /48, HR 74, RR 20, T 36.8, SpO2 97% RA  EKG: Sinus with PVCs  Sig Labs: WBC 3.49, Hgb 6.8, , BUN 66, Cr 2.9 (BL Cr 2-3) eGFR 16    Interventions in the ER: Tylenol and Solumedrol 125        Hospital Course:    # Acute on chronic anemia  # Chronic Pancytopenia w/ frequent iron and PRBC transfusions  # hx GI bleeding Duodenum and Gastric AVM  # suspected folic acid deficiency - on supplement   - capsule endoscopy 02/2022 and enteroscopy 10/14, Multiple AVMs in duodenum. Gastric antral vascular ectasia  - Hgb 6.8 on admission - transfused two units  - heme/onc cs appreciated - start IV venofer x 2 doses   - push enteroscopy scheduled for today  - NPO after midnight  - on retacrit injections q weekly   - oral iron as outpatient     #EVAN on CKD IV - improving  - baseline cr 2.4  - renal/bladder sono reviewed   - avoid nephrotoxics  - follow up urine lytes    # Chronic Diastolic CHF (EF 62 03/22/23) with Severe Aortic Stenosis   - diuresis on hold given EVAN  - cardio eval appreciated   - follow up structural Cardiology team- Dr. Hernandez as outpatient     # HTN/HLD  - Patient develops bleeding on aspirin  - Planned valve replacement  - A1C = 4.6  - Lipid Pofile reviewed   - c/w metoprolol    #Sciatica  #T12 compression deformity  #hx of avascular necrosis of right femoral head (seen on ct scan from 09/2022)   - Outpatient orthopedic follow up   - pain control with Tylenol     #obesity  -diet and lifestyle modification     GOC discussed - DNR/DNI    The patient is clinically sober, AAOx3, free from distracting injury.  Throughout our interactions today, the patient has demonstrated concrete thinking/reasoning, has maintained an orderly/reasonable conversation, appears to have intact insight/judgment/reason and therefore in our opinion has capacity to make decisions. The patient verbalized an understanding of our worries. We’ve told the patient that the hospital evaluation is incomplete & many troublesome conditions haven’t  been ruled out. We have discussed the need for further inpatient workup so we can get more information. We have discussed the range of possible diagnosis, potential testing & treatment options. We’ve made  numerous efforts to prevent the patient from leaving Against Medical Advice.  Our discussions included the potential outcomes of leaving AMA, including worsening of their condition, becoming permanently disabled/in pain/critically ill, or death.  Despite these efforts, we were unable to convince the patient to stay. The patient is refusing any  further care and is leaving against medical advice. We have attempted to offer treatment/diagnosis/guidance for any dangerous conditions which are most likely and/or dangerous.  We have answered all questions and have implored the patient to return ASAP to complete the workup.

## 2023-04-03 NOTE — DISCHARGE NOTE PROVIDER - PROVIDER TOKENS
PROVIDER:[TOKEN:[[id number]],FOLLOWUP:[Routine]],PROVIDER:[TOKEN:[[id number]],FOLLOWUP:[Routine]]

## 2023-04-03 NOTE — CONSULT NOTE ADULT - ASSESSMENT
Patient is a 77-year-old female w/ history of HTN, HLD, CKD4, Severe AS, mild PHTN, Pancytopenia 2/2 covid?, hx GI bleeding (from Duodenum and Gastric AVM capsule endoscopy 02/2022 and enteroscopy 10/14 found to have 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 Gaston for hgb 6.8. Heme/Onc was consulted for pancytopenia.       Impression    #Iron Deficiency Anemia 2/2 GI bleed  #hx of multiple AVMs in duodenum  - Hgb 6.9 on admission. Now 8.3 s/p 2 units pRBC  - Iron studies in 3/30: Iron 20, Ferritin 24, Iron %sat 8, reticulocyte 2.6  - Hx of severe aortic stenosis  - Receives retacrit 30k infusions once weekly    #Leukopenia  - WBC 1.9  - Possible underlying MDS. Patient offered bone marrow biopsy in past but refused      Plan  - f/u GI push enteroscopy  - Give 200 mg Venofer 2x IV  - Patient is due for Retacrit 30K subQ. Last given on 3/17  - Keep Hgb above 7  - Active type and screen  - f/u Dr. Gaston o/p Patient is a 77-year-old female w/ history of HTN, HLD, CKD4, Severe AS, mild PHTN, Pancytopenia 2/2 covid?, hx GI bleeding (from Duodenum and Gastric AVM capsule endoscopy 02/2022 and enteroscopy 10/14 found to have 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 Gaston for hgb 6.8. Heme/Onc was consulted for pancytopenia.       Impression    #Iron Deficiency Anemia 2/2 GI bleed  #hx of multiple AVMs in duodenum  - Hgb 6.9 on admission. Now 8.3 s/p 2 units pRBC  - Iron studies in 3/30: Iron 20, Ferritin 24, Iron %sat 8, RI 2.15 (adequate proliferation)  - Hx of severe aortic stenosis  - Receives retacrit 30k Inj weekly (last dose 3/17)    #Leukopenia  - WBC 1.9. Her WBC has been low since Jan 2023  - MDS? . Patient offered bone marrow biopsy in past but refused      Plan    - f/u GI push enteroscopy results  - Give 200 mg Venofer x 2  IV. We will set her up to receive further infusions at [facility]  - Start Retacrit 30,000 U weekly  - Keep Hgb above 7  - Active type and screen  - f/u Dr. Gaston o/p    for any questions call [phone] or text via MS teams Patient is a 77-year-old female w/ history of HTN, HLD, CKD4, Severe AS, mild PHTN, Pancytopenia 2/2 covid?, hx GI bleeding (from Duodenum and Gastric AVM capsule endoscopy 02/2022 and enteroscopy 10/14 found to have 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 Gaston for hgb 6.8. Heme/Onc was consulted for pancytopenia.       Impression    #Iron Deficiency Anemia 2/2 GI bleed  #hx of multiple AVMs in duodenum  - Hgb 6.9 on admission. Now 8.3 s/p 2 units pRBC  - Iron studies in 3/30: Iron 20, Ferritin 24, Iron %sat 8, RI 2.15 (adequate proliferation)  - Hx of severe aortic stenosis  - Receives retacrit 30k Inj weekly (last dose 3/17)    #Leukopenia  - WBC 1.9. Her WBC has been low since Jan 2023  - MDS? . Patient offered bone marrow biopsy in past but refused      Plan    - f/u GI push enteroscopy results  - Give 200 mg IV Venofer daily  x 2.  We will set her up to receive further infusions at [facility]  - Start Retacrit 30,000 U weekly  - Keep Hgb above 7  - Active type and screen  - f/u Dr. Gaston o/p    for any questions call [phone] or text via MS teams Patient is a 77-year-old female w/ history of HTN, HLD, CKD4, Severe AS, mild PHTN, Pancytopenia 2/2 covid?, hx GI bleeding (from Duodenum and Gastric AVM capsule endoscopy 02/2022 and enteroscopy 10/14 found to have 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 Gaston for hgb 6.8. Heme/Onc was consulted for pancytopenia.       Impression    #Iron Deficiency Anemia 2/2 GI bleed  #hx of multiple AVMs in duodenum  - Hgb 6.9 on admission. Now 8.3 s/p 2 units pRBC  - Iron studies in 3/30: Iron 20, Ferritin 24, Iron %sat 8, RI 2.15 (adequate proliferation)  - Hx of severe aortic stenosis  - Receives retacrit 30k Inj weekly (last dose 3/17)  - her ferritin stores have been low despite iron infusions likely from GI losses    #Leukopenia  - WBC 1.9. Her WBC has been low since Jan 2023  - MDS? . Patient offered bone marrow biopsy in past but refused      Plan    - f/u GI push enteroscopy results  - Give 200 mg IV Venofer daily  x 2.  We will set her up to receive further infusions at [facility]  - Start Retacrit 30,000 U weekly  - Keep Hgb above 7  - Active type and screen  - f/u Dr. Gaston o/p    for any questions call [phone] or text via MS teams

## 2023-04-03 NOTE — DISCHARGE NOTE PROVIDER - CARE PROVIDER_API CALL
Gildardo Bill  [id number]  [address] [place] [address]  Phone: [phone]  Fax: [phone]  Follow Up Time: Routine    Noam Rodriguez)  Gastroenterology; Internal Medicine  [address] [place] [address]  Phone: [phone]  Fax: [phone]  Follow Up Time: Routine

## 2023-04-03 NOTE — PROGRESS NOTE ADULT - SUBJECTIVE AND OBJECTIVE BOX
LATRICIA ARREAGA  77y  Female      Patient is a 77y old Female who presents with Low Hgb As OP (2023 10:18)      INTERVAL HPI/OVERNIGHT EVENTS:  Patient seen and examined earlier this morning  lying in bed  in NAD  signed consent for blood transfusion  denies any complaints      REVIEW OF SYSTEMS:  CONSTITUTIONAL: No fever, weight loss, or fatigue  EYES: No eye pain, visual disturbances, or discharge  ENMT:  No difficulty hearing, tinnitus, vertigo; No sinus or throat pain  NECK: No pain or stiffness  RESPIRATORY: No cough, wheezing, chills or hemoptysis; No shortness of breath  CARDIOVASCULAR: No chest pain, palpitations, dizziness, or leg swelling  GASTROINTESTINAL: No abdominal or epigastric pain. No nausea, vomiting, or hematemesis; No diarrhea or constipation. No melena or hematochezia.  GENITOURINARY: No dysuria, frequency, hematuria, or incontinence  NEUROLOGICAL: No headaches, memory loss, loss of strength, numbness, or tremors  SKIN: No itching, burning, rashes, or lesions   LYMPH NODES: No enlarged glands  ENDOCRINE: No heat or cold intolerance; No hair loss  MUSCULOSKELETAL: No joint pain or swelling; No muscle, back, or extremity pain  PSYCHIATRIC: No depression, anxiety, mood swings, or difficulty sleeping  HEME/LYMPH: No easy bruising, or bleeding gums  ALLERY AND IMMUNOLOGIC: No hives or eczema    T(C): 36.7 (23 @ 09:00), Max: 36.9 (23 @ 23:10)  HR: 70 (23 @ 09:00) (65 - 72)  BP: 113/54 (23 @ 09:00) (102/49 - 113/54)  RR: 18 (23 @ 09:00) (18 - 18)  SpO2: 98% (23 @ 09:00) (95% - 98%)    PHYSICAL EXAM:  GENERAL: NAD, well-groomed, well-developed  HEAD:  Atraumatic, Normocephalic  EYES: EOMI, PERRLA, conjunctiva and sclera clear  ENMT: No tonsillar erythema, exudates, or enlargement; Moist mucous membranes, Good dentition, No lesions  NECK: Supple, No JVD, Normal thyroid  NERVOUS SYSTEM:  Alert & Oriented X3, Good concentration; Moves all extremties   CHEST/LUNG: Clear to percussion bilaterally; No rales, rhonchi, wheezing, or rubs  HEART: Regular rate and rhythm; No murmurs, rubs, or gallops  ABDOMEN: Soft, Nontender, Nondistended; Bowel sounds present, obese  EXTREMITIES:  2+ Peripheral Pulses, No clubbing, cyanosis, or edema  LYMPH: No lymphadenopathy noted  SKIN: No rashes or lesions    Consultant(s) Notes Reviewed:  [x ] YES  [ ] NO  Care Discussed with Consultants/Other Providers [ x] YES  [ ] NO    LAB:                        6.9    3.13  )-----------( 156      ( 2023 05:52 )             22.8       04-02    143  |  103  |  88<HH>  ----------------------------<  85  4.2   |  26  |  3.7<H>    Ca    8.2<L>      2023 05:52  Phos  4.6     04-  Mg     2.4     04-02    TPro  5.4<L>  /  Alb  3.0<L>  /  TBili  0.4  /  DBili  x   /  AST  11  /  ALT  <5  /  AlkPhos  56  04-02    LIVER FUNCTIONS - ( 2023 05:52 )  Alb: 3.0 g/dL / Pro: 5.4 g/dL / ALK PHOS: 56 U/L / ALT: <5 U/L / AST: 11 U/L / GGT: x             Drug Dosing Weight  Height (cm): 170.2 (31 Mar 2023 06:28)  Weight (kg): 109.8 (31 Mar 2023 06:28)  BMI (kg/m2): 37.9 (31 Mar 2023 06:28)  BSA (m2): 2.19 (31 Mar 2023 06:28)        I&O's Summary    2023 07:01  -  2023 07:00  --------------------------------------------------------  IN: 480 mL / OUT: 0 mL / NET: 480 mL      Urinalysis Basic - ( 2023 23:46 )    Color: Light Yellow / Appearance: Clear / S.015 / pH: x  Gluc: x / Ketone: Negative  / Bili: Negative / Urobili: <2 mg/dL   Blood: x / Protein: Negative / Nitrite: Negative   Leuk Esterase: Negative / RBC: x / WBC x   Sq Epi: x / Non Sq Epi: x / Bacteria: x        RADIOLOGY & ADDITIONAL TESTS:  Imaging Personally Reviewed:  [x] YES  [ ] NO    HEALTH ISSUES - PROBLEM Dx:          MEDS:  acetaminophen     Tablet .. 650 milliGRAM(s) Oral every 6 hours PRN  diphenhydrAMINE 25 milliGRAM(s) Oral daily PRN  epoetin raquel-epbx (RETACRIT) Injectable 36636 Unit(s) SubCutaneous every 7 days  ferrous    sulfate 325 milliGRAM(s) Oral daily  folic acid 1 milliGRAM(s) Oral daily  metoprolol tartrate 25 milliGRAM(s) Oral two times a day  pantoprazole    Tablet 40 milliGRAM(s) Oral before breakfast  polyethylene glycol 3350 17 Gram(s) Oral daily      
LATRICIA ARREAGA 77y Female  MRN#: [id number]     Hospital Day: 1d    Pt is currently admitted with the primary diagnosis of EVAN    Overnight events   -No major overnight events                                          ----------------------------------------------------------  OBJECTIVE  PAST MEDICAL & SURGICAL HISTORY  HTN (hypertension)    Heart murmur    Eczema    Anemia  iron infusions and PRBC transfusions    Aortic stenosis    Chronic kidney disease (CKD)    2019 novel coronavirus disease (COVID-19)  4/2020- REHAB admission    H/O appendicitis    H/O cholecystitis  s/p cholecystectomy                                              -----------------------------------------------------------  ALLERGIES:  aspirin (Rash; Other)  EKG leads (Hives)  latex (Unknown)  penicillins (Rash; Urticaria; Hives; Blisters)                                            ------------------------------------------------------------    HOME MEDICATIONS  Home Medications:  Benadryl 25 mg oral capsule: 1 cap(s) orally once a day (at bedtime), As Needed (21 Mar 2023 23:37)  folic acid 1 mg oral tablet: 1 tab(s) orally once a day (21 Mar 2023 23:37)  metoprolol tartrate 25 mg oral tablet: 1 tab(s) orally 2 times a day (21 Mar 2023 23:37)  MiraLax oral powder for reconstitution: 17 gram(s) orally once a day (21 Mar 2023 23:37)  Retacrit 20,000 units/mL injectable solution: 97323 unit(s) injectable once a week (21 Mar 2023 23:37)                           MEDICATIONS:  STANDING MEDICATIONS  ferrous    sulfate 325 milliGRAM(s) Oral daily  folic acid 1 milliGRAM(s) Oral daily  furosemide    Tablet 60 milliGRAM(s) Oral two times a day  metoprolol tartrate 25 milliGRAM(s) Oral two times a day  pantoprazole    Tablet 40 milliGRAM(s) Oral before breakfast  polyethylene glycol 3350 17 Gram(s) Oral daily    PRN MEDICATIONS  acetaminophen     Tablet .. 650 milliGRAM(s) Oral every 6 hours PRN  diphenhydrAMINE 25 milliGRAM(s) Oral daily PRN                                            ------------------------------------------------------------  VITAL SIGNS: Last 24 Hours  T(C): 36.1 (01 Apr 2023 08:00), Max: 36.9 (31 Mar 2023 23:53)  T(F): 97 (01 Apr 2023 08:00), Max: 98.4 (31 Mar 2023 23:53)  HR: 60 (01 Apr 2023 08:00) (60 - 80)  BP: 105/51 (01 Apr 2023 08:00) (105/51 - 110/57)  BP(mean): --  RR: 18 (01 Apr 2023 08:00) (18 - 18)  SpO2: 95% (01 Apr 2023 08:00) (95% - 96%)                                             --------------------------------------------------------------  LABS:                        7.3    1.91  )-----------( 129      ( 31 Mar 2023 14:27 )             22.9     03-31    139  |  101  |  72<HH>  ----------------------------<  163<H>  4.1   |  26  |  3.0<H>    Ca    8.1<L>      31 Mar 2023 14:27    TPro  5.6<L>  /  Alb  3.2<L>  /  TBili  0.9  /  DBili  x   /  AST  11  /  ALT  <5  /  AlkPhos  70  03-31                                                                --------------------------------------------------------------    PHYSICAL EXAM:  GENERAL: Awake, alert, oriented to person, place, time, situation. Well-nourished, laying in bed appearing in no acute distress  HEENT: No FNDs, atraumatic, normocephalic  LUNGS: Clear to auscultation bilaterally  HEART: S1/S2. No heaves or thrills  ABD: Soft, non-tender, non-distended.  EXT/NEURO: Strength, sensation and ROM grossly intact.  SKIN: No edema      PLAN:  HPI: 77-year-old female w/ history of HTN, HLD, CKD4, Severe AS, mild PHTN, Pancytopenia 2/2 covid?, hx GI bleeding (from Duodenum and Gastric AVM capsule endoscopy 02/2022 and enteroscopy 10/14 found to have 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 Gaston for EVAN. Patient denies fever, chills, CP, palpitations, nausea, vomiting, abdominal pain, diarrhea, dysuria.     #Acute Kidney Injury  #Stage 4 Chronic kidney disease  -Cr 3, baseline 2-2.2 (gfr mid to low 20s)  -unclear etiology, unlikely ATN  -f/u RBUS  -f/u Ulytes  -encourage PO intake, does not appear hypovolemic  -restart diuresis once work up complete    #Chronic Anemia   #Chronic Pancytopenia w/ frequent iron and PRBC transfusions  # hx GI bleeding Duodenum and Gastric AVM  - capsule endoscopy 02/2022 and enteroscopy 10/14, Multiple AVMs in duodenum. Gastric antral vascular ectasia  - Hgb 6.8 on admission   - Transfuse 1 Unit PRBC, pretreat with Tylenol and Solumedrol 125  - Follow up with   - [facility]  -GI- planning double 4/3, pre-op 4/2, cardiac risk stratification in chart  -C/w Pantoprazole 40    # Chronic Diastolic CHF (EF 62 03/22/23) with Severe Aortic Stenosis   -diuresis on hold given EVAN    #Diet  # Constipation  - Cont laxative  - High fiber diet  - Low K diet    # HTN/HLD  - Patient develops bleeding on aspirin  - Planned valve replacement  - A1C, Lipid Pofile in AM   - c/w metoprolol    # Sciatica  - Tylenol prn    # Uterine fibroid/T12 compression deformity/  hx of avascular necrosis of right femoral head (seen on ct scan from 09/2022)   - Outpatient f/u with orthopedic     # Stasis Dermatitis  - Silvadine & foot care  - Last duplex on 03/23/23 2023 was -ve.     # DVT prophylaxis   -SCDs    # GI prophylaxis   -PPI    # Activity Score (AM-PAC)  -IAT    #Progress Note Handoff  Pending (specify): GI, EVAN work up  Family discussion:   Disposition:   -from home  -possible DC 4/3 post double with GI    
SUBJECTIVE:    Patient is a 77y old Female who presents with a chief complaint of Low Hgb As OP (2023 13:45)    Currently admitted to medicine with the primary diagnosis of GI bleed       Today is hospital day 3d. This morning she is resting comfortably in bed and reports no new issues or overnight events.     PAST MEDICAL & SURGICAL HISTORY  HTN (hypertension)    Heart murmur    Eczema    Anemia  iron infusions and PRBC transfusions    Aortic stenosis    Chronic kidney disease (CKD)     novel coronavirus disease (COVID-19)  2020- REHAB admission    H/O appendicitis    H/O cholecystitis  s/p cholecystectomy      SOCIAL HISTORY:  Negative for smoking/alcohol/drug use.     ALLERGIES:  aspirin (Rash; Other)  EKG leads (Hives)  latex (Unknown)  penicillins (Rash; Urticaria; Hives; Blisters)    MEDICATIONS:  STANDING MEDICATIONS  epoetin raquel-epbx (RETACRIT) Injectable 52255 Unit(s) SubCutaneous every 7 days  ferrous    sulfate 325 milliGRAM(s) Oral daily  folic acid 1 milliGRAM(s) Oral daily  metoprolol tartrate 25 milliGRAM(s) Oral two times a day  pantoprazole    Tablet 40 milliGRAM(s) Oral before breakfast  polyethylene glycol 3350 17 Gram(s) Oral daily    PRN MEDICATIONS  acetaminophen     Tablet .. 650 milliGRAM(s) Oral every 6 hours PRN  diphenhydrAMINE 25 milliGRAM(s) Oral daily PRN    VITALS:   T(F): 97.1  HR: 62  BP: 113/68  RR: 20  SpO2: 99%    LABS:                        8.3    1.90  )-----------( 158      ( 2023 20:07 )             26.6     04-02    143  |  105  |  88<HH>  ----------------------------<  181<H>  4.5   |  24  |  3.2<H>    Ca    8.4      2023 20:07  Phos  4.6     04-01  Mg     2.4     04-02    TPro  5.8<L>  /  Alb  3.3<L>  /  TBili  0.5  /  DBili  x   /  AST  18  /  ALT  8   /  AlkPhos  66  04-02    PT/INR - ( 2023 20:07 )   PT: 11.30 sec;   INR: 0.99 ratio         PTT - ( 2023 20:07 )  PTT:32.2 sec  Urinalysis Basic - ( 2023 23:46 )    Color: Light Yellow / Appearance: Clear / S.015 / pH: x  Gluc: x / Ketone: Negative  / Bili: Negative / Urobili: <2 mg/dL   Blood: x / Protein: Negative / Nitrite: Negative   Leuk Esterase: Negative / RBC: x / WBC x   Sq Epi: x / Non Sq Epi: x / Bacteria: x                RADIOLOGY:  ACC: [id number] EXAM:  US KIDNEYS AND BLADDER   ORDERED BY: DARREN TAPIA     PROCEDURE DATE:  2023          INTERPRETATION:  CLINICAL INFORMATION: Acute kidney injury    COMPARISON: 3/9/2022    TECHNIQUE: Sonography of the kidneys and bladder.    FINDINGS:    Right kidney: 9.8 cm. No hydronephrosis. Echogenic.    Left kidney:  9.9 cm. no hydronephrosis. Echogenic    Urinary bladder: No debris or calculus. Bilateral ureteral jets are   visualized. Prevoid volume of approximately 253 mL.  IMPRESSION:    No hydronephrosis bilaterally.    Echogenic kidneys which can be seen with medical renal disease.    PHYSICAL EXAM:  GEN: No acute distress  LUNGS: Clear to auscultation bilaterally   HEART: S1/S2 present. RRR.   ABD: Soft, non-tender, non-distended. Bowel sounds present  EXT: NC/NC/NE/2+PP/VERGARA/Skin Intact.   NEURO: AAOX3            
  LATRICIA ARREAGA  77y  Female      Patient is a 77y old Female who presents with Low Hgb As OP (2023 10:18)      INTERVAL HPI/OVERNIGHT EVENTS:  Patient seen and examined earlier this morning  lying in bed  in NAD  scheduled for push enteroscopy today    REVIEW OF SYSTEMS:  CONSTITUTIONAL: No fever, weight loss, or fatigue  EYES: No eye pain, visual disturbances, or discharge  ENMT:  No difficulty hearing, tinnitus, vertigo; No sinus or throat pain  NECK: No pain or stiffness  RESPIRATORY: No cough, wheezing, chills or hemoptysis; No shortness of breath  CARDIOVASCULAR: No chest pain, palpitations, dizziness, or leg swelling  GASTROINTESTINAL: No abdominal or epigastric pain. No nausea, vomiting, or hematemesis; No diarrhea or constipation. No melena or hematochezia.  GENITOURINARY: No dysuria, frequency, hematuria, or incontinence  NEUROLOGICAL: No headaches, memory loss, loss of strength, numbness, or tremors  SKIN: No itching, burning, rashes, or lesions   LYMPH NODES: No enlarged glands  ENDOCRINE: No heat or cold intolerance; No hair loss  MUSCULOSKELETAL: No joint pain or swelling; No muscle, back, or extremity pain  PSYCHIATRIC: No depression, anxiety, mood swings, or difficulty sleeping  HEME/LYMPH: No easy bruising, or bleeding gums  ALLERY AND IMMUNOLOGIC: No hives or eczema    Vital Signs Last 24 Hrs  T(C): 36.1 (2023 11:17), Max: 36.3 (2023 15:35)  T(F): 97 (2023 11:17), Max: 97.4 (2023 15:35)  HR: 61 (2023 12:23) (61 - 77)  BP: 106/51 (2023 12:23) (102/49 - 124/56)  BP(mean): --  RR: 20 (2023 11:32) (17 - 20)  SpO2: 99% (2023 11:32) (95% - 99%)    Parameters below as of 2023 11:27  Patient On (Oxygen Delivery Method): room air      PHYSICAL EXAM:  GENERAL: NAD, well-groomed, well-developed  HEAD:  Atraumatic, Normocephalic  EYES: EOMI, PERRLA, conjunctiva and sclera clear  ENMT: No tonsillar erythema, exudates, or enlargement; Moist mucous membranes, Good dentition, No lesions  NECK: Supple, No JVD, Normal thyroid  NERVOUS SYSTEM:  Alert & Oriented X3, Good concentration; Moves all extremities   CHEST/LUNG: Clear to percussion bilaterally; No rales, rhonchi, wheezing, or rubs  HEART: Regular rate and rhythm; No murmurs, rubs, or gallops  ABDOMEN: Soft, Nontender, Nondistended; Bowel sounds present, obese  EXTREMITIES:  2+ Peripheral Pulses, No clubbing, cyanosis, or edema  LYMPH: No lymphadenopathy noted  SKIN: No rashes or lesions    Consultant(s) Notes Reviewed:  [x ] YES  [ ] NO  Care Discussed with Consultants/Other Providers [ x] YES  [ ] NO      LAB:                          8.3    1.90  )-----------( 158      ( 2023 20:07 )             26.6       04-02    143  |  105  |  88<HH>  ----------------------------<  181<H>  4.5   |  24  |  3.2<H>    Ca    8.4      2023 20:07  Mg     2.4     04-02    TPro  5.8<L>  /  Alb  3.3<L>  /  TBili  0.5  /  DBili  x   /  AST  18  /  ALT  8   /  AlkPhos  66  04-02      Drug Dosing Weight  Height (cm): 170.2 (31 Mar 2023 06:28)  Weight (kg): 109.8 (31 Mar 2023 06:28)  BMI (kg/m2): 37.9 (31 Mar 2023 06:28)  BSA (m2): 2.19 (31 Mar 2023 06:28)        Urinalysis Basic - ( 2023 23:46 )    Color: Light Yellow / Appearance: Clear / S.015 / pH: x  Gluc: x / Ketone: Negative  / Bili: Negative / Urobili: <2 mg/dL   Blood: x / Protein: Negative / Nitrite: Negative   Leuk Esterase: Negative / RBC: x / WBC x   Sq Epi: x / Non Sq Epi: x / Bacteria: x        RADIOLOGY & ADDITIONAL TESTS:  Imaging Personally Reviewed:  [x] YES  [ ] NO        MEDICATIONS  (STANDING):  epoetin raquel-epbx (RETACRIT) Injectable 92458 Unit(s) SubCutaneous every 7 days  ferrous    sulfate 325 milliGRAM(s) Oral daily  folic acid 1 milliGRAM(s) Oral daily  metoprolol tartrate 25 milliGRAM(s) Oral two times a day  pantoprazole    Tablet 40 milliGRAM(s) Oral before breakfast  polyethylene glycol 3350 17 Gram(s) Oral daily    MEDICATIONS  (PRN):  acetaminophen     Tablet .. 650 milliGRAM(s) Oral every 6 hours PRN Mild Pain (1 - 3)  diphenhydrAMINE 25 milliGRAM(s) Oral daily PRN Rash and/or Itching

## 2023-04-03 NOTE — CONSULT NOTE ADULT - ATTENDING COMMENTS
I edited the note.
Patient examined by myself , s/p push enteroscopy , as per patient a couple of AVMs were clipped ?  Hgb > 8 post transfusion .   Still with RLE edema, diuretics reduced to worsening renal function . will give one dose of venofer and continue weekly ( ferritin consistently < 30 ) due to ongoing GI bleed.  Should follow more closely with other disciplines especially cardiology and GI .
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 Plan for EGD for further evaluation.     On my interview, patient says she was not on diuretics until recently. She saw her Hematologist who prescribed Lasix (or torsemide) 20 mg daily x 7 days for NIKOLAI. On 3/21/23, patient was hospitalized with SOB and L pleuritic chest pain. She was found to be fluid overloaded and received IV diuresis. Per Nephrology recommendations, patient was discharged on Lasix 60 mg PO BID. At the time of discharge, Cr was 3.3.     Cardiology consulted for preoperative evaluation before EGD. Although patient has undergone endoscopic procedures in the past, the severity of her aortic stenosis has worsened and she is now at higher risk than she was previously. Agree with above recommendations for reducing diuretics, enlisting the assistance of Cardiac Anesthesia, and considering an A-line as the patient is preload dependent. Additionally, if the patient is not actively bleeding, would consider deferring the EGD as the patient will continue to form new AVMs in the s/o severe AS. A priority of her care should be involving the Structural Cardiology  by Dr. Hernnadez. Please contact Dr. Hernandez on Monday 4/03/23.     High risk for perioperative major adverse cardiac events    Please order a chest x-ray on the day of EGD for a better understanding of patient's volume status going into the procedure. There are no current cardiac contraindications - MI within 60 days or unstable angina, decompensated heart failure, unstable arrhythmias, or hemodynamically significant valvular disease - that prohibit proceeding with the scheduled surgery/procedure. This consult serves only as a perioperative cardiac risk stratification and evaluation to predict 30-day cardiac complications risk and mortality. The decision to proceed with the surgery/procedure is made by the performing physician and the patient.     Cardiology consult team to sign off.

## 2023-04-04 LAB
VWF:RCO ACT/NOR PPP PL AGG: 186 % — HIGH (ref 40–120)
VWF:RCO ACT/NOR PPP PL AGG: 340 % — HIGH (ref 40–120)

## 2023-04-06 ENCOUNTER — OUTPATIENT (OUTPATIENT)
Dept: OUTPATIENT SERVICES | Facility: HOSPITAL | Age: 78
LOS: 1 days | End: 2023-04-06
Payer: MEDICARE

## 2023-04-06 ENCOUNTER — APPOINTMENT (OUTPATIENT)
Dept: HEMATOLOGY ONCOLOGY | Facility: CLINIC | Age: 78
End: 2023-04-06

## 2023-04-06 ENCOUNTER — NON-APPOINTMENT (OUTPATIENT)
Age: 78
End: 2023-04-06

## 2023-04-06 ENCOUNTER — LABORATORY RESULT (OUTPATIENT)
Age: 78
End: 2023-04-06

## 2023-04-06 DIAGNOSIS — Z87.19 PERSONAL HISTORY OF OTHER DISEASES OF THE DIGESTIVE SYSTEM: Chronic | ICD-10-CM

## 2023-04-06 DIAGNOSIS — D69.6 THROMBOCYTOPENIA, UNSPECIFIED: ICD-10-CM

## 2023-04-06 DIAGNOSIS — D64.9 ANEMIA, UNSPECIFIED: ICD-10-CM

## 2023-04-06 LAB — SURGICAL PATHOLOGY STUDY: SIGNIFICANT CHANGE UP

## 2023-04-06 PROCEDURE — 86900 BLOOD TYPING SEROLOGIC ABO: CPT

## 2023-04-06 PROCEDURE — 86901 BLOOD TYPING SEROLOGIC RH(D): CPT

## 2023-04-06 PROCEDURE — 85027 COMPLETE CBC AUTOMATED: CPT

## 2023-04-06 PROCEDURE — 36415 COLL VENOUS BLD VENIPUNCTURE: CPT

## 2023-04-06 PROCEDURE — 86850 RBC ANTIBODY SCREEN: CPT

## 2023-04-06 PROCEDURE — 86902 BLOOD TYPE ANTIGEN DONOR EA: CPT

## 2023-04-06 PROCEDURE — 86922 COMPATIBILITY TEST ANTIGLOB: CPT

## 2023-04-07 ENCOUNTER — OUTPATIENT (OUTPATIENT)
Dept: OUTPATIENT SERVICES | Facility: HOSPITAL | Age: 78
LOS: 1 days | End: 2023-04-07
Payer: MEDICARE

## 2023-04-07 ENCOUNTER — APPOINTMENT (OUTPATIENT)
Dept: INFUSION THERAPY | Facility: CLINIC | Age: 78
End: 2023-04-07

## 2023-04-07 ENCOUNTER — NON-APPOINTMENT (OUTPATIENT)
Age: 78
End: 2023-04-07

## 2023-04-07 VITALS
RESPIRATION RATE: 16 BRPM | TEMPERATURE: 96.8 F | DIASTOLIC BLOOD PRESSURE: 58 MMHG | HEART RATE: 89 BPM | SYSTOLIC BLOOD PRESSURE: 110 MMHG

## 2023-04-07 DIAGNOSIS — D64.9 ANEMIA, UNSPECIFIED: ICD-10-CM

## 2023-04-07 DIAGNOSIS — Z87.19 PERSONAL HISTORY OF OTHER DISEASES OF THE DIGESTIVE SYSTEM: Chronic | ICD-10-CM

## 2023-04-07 DIAGNOSIS — D69.6 THROMBOCYTOPENIA, UNSPECIFIED: ICD-10-CM

## 2023-04-07 LAB
ABO + RH PNL BLD: NORMAL
BLD GP AB SCN SERPL QL: NORMAL
HCT VFR BLD CALC: 26.6 %
HGB BLD-MCNC: 7.9 G/DL
MCHC RBC-ENTMCNC: 27.2 PG
MCHC RBC-ENTMCNC: 29.7 G/DL
MCV RBC AUTO: 91.7 FL
PLATELET # BLD AUTO: 139 K/UL
PMV BLD: 10.3 FL
RBC # BLD: 2.9 M/UL
RBC # FLD: 16.3 %
WBC # FLD AUTO: 4.31 K/UL

## 2023-04-07 PROCEDURE — 96372 THER/PROPH/DIAG INJ SC/IM: CPT

## 2023-04-07 PROCEDURE — 96365 THER/PROPH/DIAG IV INF INIT: CPT

## 2023-04-07 PROCEDURE — 36430 TRANSFUSION BLD/BLD COMPNT: CPT

## 2023-04-07 PROCEDURE — P9040: CPT

## 2023-04-07 RX ORDER — ERYTHROPOIETIN 10000 [IU]/ML
30000 INJECTION, SOLUTION INTRAVENOUS; SUBCUTANEOUS ONCE
Refills: 0 | Status: COMPLETED | OUTPATIENT
Start: 2023-04-07 | End: 2023-04-07

## 2023-04-07 RX ORDER — IRON SUCROSE 20 MG/ML
200 INJECTION, SOLUTION INTRAVENOUS ONCE
Refills: 0 | Status: COMPLETED | OUTPATIENT
Start: 2023-04-07 | End: 2023-04-07

## 2023-04-07 RX ADMIN — ERYTHROPOIETIN 30000 UNIT(S): 10000 INJECTION, SOLUTION INTRAVENOUS; SUBCUTANEOUS at 12:43

## 2023-04-07 RX ADMIN — IRON SUCROSE 220 MILLIGRAM(S): 20 INJECTION, SOLUTION INTRAVENOUS at 12:45

## 2023-04-10 DIAGNOSIS — Z86.16 PERSONAL HISTORY OF COVID-19: ICD-10-CM

## 2023-04-10 DIAGNOSIS — M54.30 SCIATICA, UNSPECIFIED SIDE: ICD-10-CM

## 2023-04-10 DIAGNOSIS — K31.811 ANGIODYSPLASIA OF STOMACH AND DUODENUM WITH BLEEDING: ICD-10-CM

## 2023-04-10 DIAGNOSIS — M87.9 OSTEONECROSIS, UNSPECIFIED: ICD-10-CM

## 2023-04-10 DIAGNOSIS — E53.8 DEFICIENCY OF OTHER SPECIFIED B GROUP VITAMINS: ICD-10-CM

## 2023-04-10 DIAGNOSIS — I27.20 PULMONARY HYPERTENSION, UNSPECIFIED: ICD-10-CM

## 2023-04-10 DIAGNOSIS — I87.2 VENOUS INSUFFICIENCY (CHRONIC) (PERIPHERAL): ICD-10-CM

## 2023-04-10 DIAGNOSIS — I50.32 CHRONIC DIASTOLIC (CONGESTIVE) HEART FAILURE: ICD-10-CM

## 2023-04-10 DIAGNOSIS — Z91.048 OTHER NONMEDICINAL SUBSTANCE ALLERGY STATUS: ICD-10-CM

## 2023-04-10 DIAGNOSIS — Z88.6 ALLERGY STATUS TO ANALGESIC AGENT: ICD-10-CM

## 2023-04-10 DIAGNOSIS — N18.4 CHRONIC KIDNEY DISEASE, STAGE 4 (SEVERE): ICD-10-CM

## 2023-04-10 DIAGNOSIS — E66.9 OBESITY, UNSPECIFIED: ICD-10-CM

## 2023-04-10 DIAGNOSIS — N17.9 ACUTE KIDNEY FAILURE, UNSPECIFIED: ICD-10-CM

## 2023-04-10 DIAGNOSIS — Z88.0 ALLERGY STATUS TO PENICILLIN: ICD-10-CM

## 2023-04-10 DIAGNOSIS — Z28.310 UNVACCINATED FOR COVID-19: ICD-10-CM

## 2023-04-10 DIAGNOSIS — I13.0 HYPERTENSIVE HEART AND CHRONIC KIDNEY DISEASE WITH HEART FAILURE AND STAGE 1 THROUGH STAGE 4 CHRONIC KIDNEY DISEASE, OR UNSPECIFIED CHRONIC KIDNEY DISEASE: ICD-10-CM

## 2023-04-10 DIAGNOSIS — D61.818 OTHER PANCYTOPENIA: ICD-10-CM

## 2023-04-10 DIAGNOSIS — K59.00 CONSTIPATION, UNSPECIFIED: ICD-10-CM

## 2023-04-10 DIAGNOSIS — I35.0 NONRHEUMATIC AORTIC (VALVE) STENOSIS: ICD-10-CM

## 2023-04-10 DIAGNOSIS — Z91.040 LATEX ALLERGY STATUS: ICD-10-CM

## 2023-04-10 DIAGNOSIS — E78.5 HYPERLIPIDEMIA, UNSPECIFIED: ICD-10-CM

## 2023-04-13 ENCOUNTER — APPOINTMENT (OUTPATIENT)
Dept: INFUSION THERAPY | Facility: CLINIC | Age: 78
End: 2023-04-13

## 2023-04-13 ENCOUNTER — OUTPATIENT (OUTPATIENT)
Dept: OUTPATIENT SERVICES | Facility: HOSPITAL | Age: 78
LOS: 1 days | End: 2023-04-13
Payer: MEDICARE

## 2023-04-13 ENCOUNTER — OUTPATIENT (OUTPATIENT)
Dept: OUTPATIENT SERVICES | Facility: HOSPITAL | Age: 78
LOS: 1 days | End: 2023-04-13

## 2023-04-13 ENCOUNTER — APPOINTMENT (OUTPATIENT)
Dept: HEMATOLOGY ONCOLOGY | Facility: CLINIC | Age: 78
End: 2023-04-13
Payer: MEDICARE

## 2023-04-13 VITALS
RESPIRATION RATE: 16 BRPM | TEMPERATURE: 97.1 F | HEART RATE: 67 BPM | BODY MASS INDEX: 37.28 KG/M2 | OXYGEN SATURATION: 98 % | WEIGHT: 232 LBS | DIASTOLIC BLOOD PRESSURE: 65 MMHG | HEIGHT: 66 IN | SYSTOLIC BLOOD PRESSURE: 140 MMHG

## 2023-04-13 DIAGNOSIS — Z87.19 PERSONAL HISTORY OF OTHER DISEASES OF THE DIGESTIVE SYSTEM: Chronic | ICD-10-CM

## 2023-04-13 DIAGNOSIS — D64.9 ANEMIA, UNSPECIFIED: ICD-10-CM

## 2023-04-13 PROCEDURE — 99213 OFFICE O/P EST LOW 20 MIN: CPT

## 2023-04-13 RX ORDER — IRON SUCROSE 20 MG/ML
200 INJECTION, SOLUTION INTRAVENOUS ONCE
Refills: 0 | Status: COMPLETED | OUTPATIENT
Start: 2023-04-13 | End: 2023-04-13

## 2023-04-13 RX ORDER — ERYTHROPOIETIN 10000 [IU]/ML
30000 INJECTION, SOLUTION INTRAVENOUS; SUBCUTANEOUS ONCE
Refills: 0 | Status: COMPLETED | OUTPATIENT
Start: 2023-04-13 | End: 2023-04-13

## 2023-04-13 RX ADMIN — IRON SUCROSE 220 MILLIGRAM(S): 20 INJECTION, SOLUTION INTRAVENOUS at 12:47

## 2023-04-13 RX ADMIN — ERYTHROPOIETIN 30000 UNIT(S): 10000 INJECTION, SOLUTION INTRAVENOUS; SUBCUTANEOUS at 12:46

## 2023-04-14 LAB
ALBUMIN SERPL ELPH-MCNC: 3.4 G/DL
ALP BLD-CCNC: 64 U/L
ALT SERPL-CCNC: <5 U/L
ANION GAP SERPL CALC-SCNC: 9 MMOL/L
AST SERPL-CCNC: 12 U/L
BILIRUB SERPL-MCNC: 0.6 MG/DL
BUN SERPL-MCNC: 48 MG/DL
CALCIUM SERPL-MCNC: 8.5 MG/DL
CHLORIDE SERPL-SCNC: 112 MMOL/L
CO2 SERPL-SCNC: 21 MMOL/L
CREAT SERPL-MCNC: 2.3 MG/DL
EGFR: 21 ML/MIN/1.73M2
GLUCOSE SERPL-MCNC: 81 MG/DL
POTASSIUM SERPL-SCNC: 4.7 MMOL/L
PROT SERPL-MCNC: 5.7 G/DL
SODIUM SERPL-SCNC: 142 MMOL/L

## 2023-04-14 NOTE — ED ADULT NURSE NOTE - DOES PATIENT HAVE ADVANCE DIRECTIVE
-- DO NOT REPLY / DO NOT REPLY ALL --  -- Message is from [facility]agement Center [employer] ([facility]) --    General Patient Message: patient is retuning Dr. Joel Angeles phone call.      Caller Information       Type Contact Phone/Fax    04/14/2023 04:37 PM CDT Phone (Incoming) Shreya Rider (Self) [phone] (M)        Alternative phone number: none     Can a detailed message be left? Yes    Message Turnaround:     Is it Working Hours? No - After Hours/Weekend/Holiday     Did the caller agree that this message can wait until the office reopens in the morning? YES - The Message Can Wait - Send a message to the provider's clinical support pool     [place]:    Please give this turnaround time to the caller:   \"This message will be sent to [state Provider's name]. The clinical team will fulfill your request as soon as they review your message.\"                
No

## 2023-04-20 ENCOUNTER — INPATIENT (INPATIENT)
Facility: HOSPITAL | Age: 78
LOS: 3 days | Discharge: HOME HEALTH | DRG: 811 | End: 2023-04-24
Attending: INTERNAL MEDICINE | Admitting: INTERNAL MEDICINE
Payer: MEDICARE

## 2023-04-20 VITALS
RESPIRATION RATE: 16 BRPM | TEMPERATURE: 98 F | WEIGHT: 231.93 LBS | DIASTOLIC BLOOD PRESSURE: 53 MMHG | HEIGHT: 67 IN | HEART RATE: 79 BPM | SYSTOLIC BLOOD PRESSURE: 120 MMHG | OXYGEN SATURATION: 99 %

## 2023-04-20 DIAGNOSIS — D64.9 ANEMIA, UNSPECIFIED: ICD-10-CM

## 2023-04-20 DIAGNOSIS — Z87.19 PERSONAL HISTORY OF OTHER DISEASES OF THE DIGESTIVE SYSTEM: Chronic | ICD-10-CM

## 2023-04-20 LAB
ALBUMIN SERPL ELPH-MCNC: 3.2 G/DL — LOW (ref 3.5–5.2)
ALP SERPL-CCNC: 71 U/L — SIGNIFICANT CHANGE UP (ref 30–115)
ALT FLD-CCNC: 5 U/L — SIGNIFICANT CHANGE UP (ref 0–41)
ANION GAP SERPL CALC-SCNC: 10 MMOL/L — SIGNIFICANT CHANGE UP (ref 7–14)
ANISOCYTOSIS BLD QL: SIGNIFICANT CHANGE UP
APTT BLD: 60.3 SEC — HIGH (ref 27–39.2)
AST SERPL-CCNC: 12 U/L — SIGNIFICANT CHANGE UP (ref 0–41)
BASOPHILS # BLD AUTO: 0 K/UL — SIGNIFICANT CHANGE UP (ref 0–0.2)
BASOPHILS NFR BLD AUTO: 0 % — SIGNIFICANT CHANGE UP (ref 0–1)
BILIRUB SERPL-MCNC: 0.4 MG/DL — SIGNIFICANT CHANGE UP (ref 0.2–1.2)
BLD GP AB SCN SERPL QL: SIGNIFICANT CHANGE UP
BUN SERPL-MCNC: 72 MG/DL — CRITICAL HIGH (ref 10–20)
CALCIUM SERPL-MCNC: 8.3 MG/DL — LOW (ref 8.4–10.5)
CHLORIDE SERPL-SCNC: 108 MMOL/L — SIGNIFICANT CHANGE UP (ref 98–110)
CO2 SERPL-SCNC: 22 MMOL/L — SIGNIFICANT CHANGE UP (ref 17–32)
CREAT SERPL-MCNC: 2.8 MG/DL — HIGH (ref 0.7–1.5)
EGFR: 17 ML/MIN/1.73M2 — LOW
EOSINOPHIL # BLD AUTO: 0.29 K/UL — SIGNIFICANT CHANGE UP (ref 0–0.7)
EOSINOPHIL NFR BLD AUTO: 8.7 % — HIGH (ref 0–8)
GLUCOSE SERPL-MCNC: 99 MG/DL — SIGNIFICANT CHANGE UP (ref 70–99)
HCT VFR BLD CALC: 16.7 % — LOW (ref 37–47)
HGB BLD-MCNC: 5.2 G/DL — CRITICAL LOW (ref 12–16)
INR BLD: 0.99 RATIO — SIGNIFICANT CHANGE UP (ref 0.65–1.3)
LACTATE SERPL-SCNC: 0.7 MMOL/L — SIGNIFICANT CHANGE UP (ref 0.7–2)
LIDOCAIN IGE QN: 76 U/L — HIGH (ref 7–60)
LYMPHOCYTES # BLD AUTO: 0.59 K/UL — LOW (ref 1.2–3.4)
LYMPHOCYTES # BLD AUTO: 17.4 % — LOW (ref 20.5–51.1)
MANUAL SMEAR VERIFICATION: SIGNIFICANT CHANGE UP
MCHC RBC-ENTMCNC: 28.7 PG — SIGNIFICANT CHANGE UP (ref 27–31)
MCHC RBC-ENTMCNC: 29.6 G/DL — LOW (ref 32–37)
MCV RBC AUTO: 97.1 FL — SIGNIFICANT CHANGE UP (ref 81–99)
MICROCYTES BLD QL: SIGNIFICANT CHANGE UP
MONOCYTES # BLD AUTO: 0.09 K/UL — LOW (ref 0.1–0.6)
MONOCYTES NFR BLD AUTO: 2.6 % — SIGNIFICANT CHANGE UP (ref 1.7–9.3)
NEUTROPHILS # BLD AUTO: 2.39 K/UL — SIGNIFICANT CHANGE UP (ref 1.4–6.5)
NEUTROPHILS NFR BLD AUTO: 70.4 % — SIGNIFICANT CHANGE UP (ref 42.2–75.2)
NRBC # BLD: 3 /100 — HIGH (ref 0–0)
NRBC # BLD: SIGNIFICANT CHANGE UP /100 WBCS (ref 0–0)
OVALOCYTES BLD QL SMEAR: SLIGHT — SIGNIFICANT CHANGE UP
PLAT MORPH BLD: NORMAL — SIGNIFICANT CHANGE UP
PLATELET # BLD AUTO: 117 K/UL — LOW (ref 130–400)
PMV BLD: 10.6 FL — HIGH (ref 7.4–10.4)
POIKILOCYTOSIS BLD QL AUTO: SLIGHT — SIGNIFICANT CHANGE UP
POLYCHROMASIA BLD QL SMEAR: SIGNIFICANT CHANGE UP
POTASSIUM SERPL-MCNC: 4.4 MMOL/L — SIGNIFICANT CHANGE UP (ref 3.5–5)
POTASSIUM SERPL-SCNC: 4.4 MMOL/L — SIGNIFICANT CHANGE UP (ref 3.5–5)
PROT SERPL-MCNC: 5.3 G/DL — LOW (ref 6–8)
PROTHROM AB SERPL-ACNC: 11.3 SEC — SIGNIFICANT CHANGE UP (ref 9.95–12.87)
RBC # BLD: 1.74 M/UL — LOW (ref 4.2–5.4)
RBC # FLD: 18.5 % — HIGH (ref 11.5–14.5)
RBC BLD AUTO: NORMAL — SIGNIFICANT CHANGE UP
SODIUM SERPL-SCNC: 140 MMOL/L — SIGNIFICANT CHANGE UP (ref 135–146)
TROPONIN T SERPL-MCNC: <0.01 NG/ML — SIGNIFICANT CHANGE UP
VARIANT LYMPHS # BLD: 0.9 % — SIGNIFICANT CHANGE UP (ref 0–5)
WBC # BLD: 3.39 K/UL — LOW (ref 4.8–10.8)
WBC # FLD AUTO: 3.39 K/UL — LOW (ref 4.8–10.8)

## 2023-04-20 PROCEDURE — 86902 BLOOD TYPE ANTIGEN DONOR EA: CPT

## 2023-04-20 PROCEDURE — 86922 COMPATIBILITY TEST ANTIGLOB: CPT

## 2023-04-20 PROCEDURE — 85025 COMPLETE CBC W/AUTO DIFF WBC: CPT

## 2023-04-20 PROCEDURE — 85027 COMPLETE CBC AUTOMATED: CPT

## 2023-04-20 PROCEDURE — P9040: CPT

## 2023-04-20 PROCEDURE — 80048 BASIC METABOLIC PNL TOTAL CA: CPT

## 2023-04-20 PROCEDURE — 83540 ASSAY OF IRON: CPT

## 2023-04-20 PROCEDURE — 82728 ASSAY OF FERRITIN: CPT

## 2023-04-20 PROCEDURE — 99285 EMERGENCY DEPT VISIT HI MDM: CPT

## 2023-04-20 PROCEDURE — 86900 BLOOD TYPING SEROLOGIC ABO: CPT

## 2023-04-20 PROCEDURE — 74176 CT ABD & PELVIS W/O CONTRAST: CPT | Mod: 26,MA

## 2023-04-20 PROCEDURE — C9113: CPT

## 2023-04-20 PROCEDURE — 71045 X-RAY EXAM CHEST 1 VIEW: CPT | Mod: 26

## 2023-04-20 PROCEDURE — 80053 COMPREHEN METABOLIC PANEL: CPT

## 2023-04-20 PROCEDURE — 36415 COLL VENOUS BLD VENIPUNCTURE: CPT

## 2023-04-20 PROCEDURE — 86901 BLOOD TYPING SEROLOGIC RH(D): CPT

## 2023-04-20 PROCEDURE — 36430 TRANSFUSION BLD/BLD COMPNT: CPT

## 2023-04-20 PROCEDURE — 83550 IRON BINDING TEST: CPT

## 2023-04-20 PROCEDURE — 84100 ASSAY OF PHOSPHORUS: CPT

## 2023-04-20 PROCEDURE — 97162 PT EVAL MOD COMPLEX 30 MIN: CPT | Mod: GP

## 2023-04-20 PROCEDURE — 86850 RBC ANTIBODY SCREEN: CPT

## 2023-04-20 PROCEDURE — 83735 ASSAY OF MAGNESIUM: CPT

## 2023-04-20 RX ORDER — PANTOPRAZOLE SODIUM 20 MG/1
80 TABLET, DELAYED RELEASE ORAL ONCE
Refills: 0 | Status: COMPLETED | OUTPATIENT
Start: 2023-04-20 | End: 2023-04-20

## 2023-04-20 RX ORDER — DEXAMETHASONE 0.5 MG/5ML
10 ELIXIR ORAL ONCE
Refills: 0 | Status: COMPLETED | OUTPATIENT
Start: 2023-04-20 | End: 2023-04-20

## 2023-04-20 RX ORDER — ACETAMINOPHEN 500 MG
650 TABLET ORAL ONCE
Refills: 0 | Status: COMPLETED | OUTPATIENT
Start: 2023-04-20 | End: 2023-04-20

## 2023-04-20 RX ADMIN — PANTOPRAZOLE SODIUM 80 MILLIGRAM(S): 20 TABLET, DELAYED RELEASE ORAL at 18:52

## 2023-04-20 RX ADMIN — Medication 650 MILLIGRAM(S): at 20:59

## 2023-04-20 RX ADMIN — Medication 10 MILLIGRAM(S): at 20:59

## 2023-04-20 NOTE — ED PROVIDER NOTE - PROGRESS NOTE DETAILS
[place]:During physical exam patient refused digital rectal exam to check if there was any bleeding.  Patient states that she has checked and denies that there is any bleeding

## 2023-04-20 NOTE — ED PROVIDER NOTE - CLINICAL SUMMARY MEDICAL DECISION MAKING FREE TEXT BOX
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 No fever chills sob does endorse epigastric/chest discomfort since yesterday no n/v black or bloody stool.s  vs reviewed labs imaging ekg obtained and reviewed protonix given gi consulted ct obtained admitted to [facility] given epigastric pain

## 2023-04-20 NOTE — ED PROVIDER NOTE - PHYSICAL EXAMINATION
VITAL SIGNS: I have reviewed nursing notes and confirm.  CONSTITUTIONAL: in no acute distress.  SKIN: Skin exam is warm and dry, no acute rash. pt appears pale   HEAD: Normocephalic; atraumatic.  EYES: EOM intact; conjunctiva pale and sclera clear.  ENT: No nasal discharge; airway clear.   NECK: Supple; non tender.  CARD: S1, S2 normal; no murmurs, gallops, or rubs. Regular rate and rhythm.  RESP: No wheezes, rales or rhonchi. Speaking in full sentences.   ABD: soft; non-distended; epigastric tenderness; No rebound or guarding. No CVA tenderness.  : pt refused OLEKSANDR  NEURO: Alert, oriented. Grossly unremarkable. No focal deficits.

## 2023-04-20 NOTE — ED PROVIDER NOTE - CARE PLAN
Principal Discharge DX:	Anemia  Secondary Diagnosis:	GI bleed  Secondary Diagnosis:	Epigastric pain   1

## 2023-04-20 NOTE — ED PROVIDER NOTE - CADM POA CENTRAL LINE
-S/p IV Ceftriaxone x 1  -Low volume ascites on CT A/P  -Concern for SBP given febrile (102-103 F) outpatient (could be 2/2 to COVID infection) with intermittent abdominal pain outpatient. However, afebrile, no WBC or abdominal pain on admission   -No previous taps, per patient  -Warrants diagnostic paracentesis, concerning for bleeding risk given elevated INR   -May empirically treat with Zosyn for coverage for PNA if febrile  -IR consult for diagnostic paracentesis  -Hepatology following, appreciate recs - cryptogenic cirrhosis, followed by Dr. Carvajal outpatient  - MELD-Na 13, 16 on admission, up from 10 outpatient   - Hep B Antibody positive, Hep DNA negative  - started on lasix 40, spironolactone 100 qd   - started on ppi 40 bid for varices in s/o low bps, hr  - Hepatology consulted, appreciate recs No

## 2023-04-20 NOTE — ED PROVIDER NOTE - ATTENDING APP SHARED VISIT CONTRIBUTION OF CARE
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 No fever chills sob does endorse epigastric/chest discomfort since yesterday no n/v black or bloody stool.s   CONSTITUTIONAL: WA / WN / NAD  HEAD: NCAT  EYES: PERRL; EOMI; pale conjunctiva  ENT: Normal pharynx; mucous membranes pink/moist, no erythema.  NECK: Supple; no meningeal signs  CARD: RRR; nl S1/S2; no M/R/G.   RESP: Respiratory rate and effort are normal; breath sounds clear and equal bilaterally.  ABD: Soft, ND +epigastric & luq ttp  MSK/EXT: No gross deformities; full range of motion.  SKIN: Warm and dry;   NEURO: AAOx3,  PSYCH: Memory Intact, Normal Affect

## 2023-04-20 NOTE — ED PROVIDER NOTE - OBJECTIVE STATEMENT
77 female history of chronic anemia/CKD, aortic stenosis hypertension presenting to ED for blood transfusion.  Patient states she has visiting home nurse who dilma labs today.  Patient got a call from hematology that she had to come to the hospital immediately as her hemoglobin was 5.4.  Here ED patient states she feels slightly weak and has some epigastric pain but denies chest pain, back pain, N, V, HA, dizziness, diarrhea

## 2023-04-21 ENCOUNTER — TRANSCRIPTION ENCOUNTER (OUTPATIENT)
Age: 78
End: 2023-04-21

## 2023-04-21 ENCOUNTER — APPOINTMENT (OUTPATIENT)
Dept: INFUSION THERAPY | Facility: CLINIC | Age: 78
End: 2023-04-21

## 2023-04-21 LAB
ALBUMIN SERPL ELPH-MCNC: 3.1 G/DL — LOW (ref 3.5–5.2)
ALP SERPL-CCNC: 73 U/L — SIGNIFICANT CHANGE UP (ref 30–115)
ALT FLD-CCNC: 5 U/L — SIGNIFICANT CHANGE UP (ref 0–41)
ANION GAP SERPL CALC-SCNC: 9 MMOL/L — SIGNIFICANT CHANGE UP (ref 7–14)
AST SERPL-CCNC: 11 U/L — SIGNIFICANT CHANGE UP (ref 0–41)
BASOPHILS # BLD AUTO: 0 K/UL — SIGNIFICANT CHANGE UP (ref 0–0.2)
BASOPHILS NFR BLD AUTO: 0 % — SIGNIFICANT CHANGE UP (ref 0–1)
BILIRUB SERPL-MCNC: 0.8 MG/DL — SIGNIFICANT CHANGE UP (ref 0.2–1.2)
BUN SERPL-MCNC: 75 MG/DL — CRITICAL HIGH (ref 10–20)
CALCIUM SERPL-MCNC: 8.3 MG/DL — LOW (ref 8.4–10.5)
CHLORIDE SERPL-SCNC: 112 MMOL/L — HIGH (ref 98–110)
CO2 SERPL-SCNC: 20 MMOL/L — SIGNIFICANT CHANGE UP (ref 17–32)
CREAT SERPL-MCNC: 2.9 MG/DL — HIGH (ref 0.7–1.5)
EGFR: 16 ML/MIN/1.73M2 — LOW
EOSINOPHIL # BLD AUTO: 0 K/UL — SIGNIFICANT CHANGE UP (ref 0–0.7)
EOSINOPHIL NFR BLD AUTO: 0 % — SIGNIFICANT CHANGE UP (ref 0–8)
FERRITIN SERPL-MCNC: 80 NG/ML — SIGNIFICANT CHANGE UP (ref 15–150)
GLUCOSE SERPL-MCNC: 175 MG/DL — HIGH (ref 70–99)
HCT VFR BLD CALC: 22 % — LOW (ref 37–47)
HCT VFR BLD CALC: 25.4 % — LOW (ref 37–47)
HGB BLD-MCNC: 7 G/DL — LOW (ref 12–16)
HGB BLD-MCNC: 8.2 G/DL — LOW (ref 12–16)
IMM GRANULOCYTES NFR BLD AUTO: 0.6 % — HIGH (ref 0.1–0.3)
IRON SATN MFR SERPL: 11 % — LOW (ref 15–50)
IRON SATN MFR SERPL: 27 UG/DL — LOW (ref 35–150)
LYMPHOCYTES # BLD AUTO: 0.21 K/UL — LOW (ref 1.2–3.4)
LYMPHOCYTES # BLD AUTO: 12.3 % — LOW (ref 20.5–51.1)
MAGNESIUM SERPL-MCNC: 2.4 MG/DL — SIGNIFICANT CHANGE UP (ref 1.8–2.4)
MCHC RBC-ENTMCNC: 29.7 PG — SIGNIFICANT CHANGE UP (ref 27–31)
MCHC RBC-ENTMCNC: 30.3 PG — SIGNIFICANT CHANGE UP (ref 27–31)
MCHC RBC-ENTMCNC: 31.8 G/DL — LOW (ref 32–37)
MCHC RBC-ENTMCNC: 32.3 G/DL — SIGNIFICANT CHANGE UP (ref 32–37)
MCV RBC AUTO: 92 FL — SIGNIFICANT CHANGE UP (ref 81–99)
MCV RBC AUTO: 95.2 FL — SIGNIFICANT CHANGE UP (ref 81–99)
MONOCYTES # BLD AUTO: 0.03 K/UL — LOW (ref 0.1–0.6)
MONOCYTES NFR BLD AUTO: 1.8 % — SIGNIFICANT CHANGE UP (ref 1.7–9.3)
NEUTROPHILS # BLD AUTO: 1.46 K/UL — SIGNIFICANT CHANGE UP (ref 1.4–6.5)
NEUTROPHILS NFR BLD AUTO: 85.3 % — HIGH (ref 42.2–75.2)
NRBC # BLD: 0 /100 WBCS — SIGNIFICANT CHANGE UP (ref 0–0)
NRBC # BLD: 0 /100 WBCS — SIGNIFICANT CHANGE UP (ref 0–0)
PHOSPHATE SERPL-MCNC: 2.9 MG/DL — SIGNIFICANT CHANGE UP (ref 2.1–4.9)
PLATELET # BLD AUTO: 119 K/UL — LOW (ref 130–400)
PLATELET # BLD AUTO: 131 K/UL — SIGNIFICANT CHANGE UP (ref 130–400)
PMV BLD: 10.8 FL — HIGH (ref 7.4–10.4)
PMV BLD: 11.1 FL — HIGH (ref 7.4–10.4)
POTASSIUM SERPL-MCNC: 4.8 MMOL/L — SIGNIFICANT CHANGE UP (ref 3.5–5)
POTASSIUM SERPL-SCNC: 4.8 MMOL/L — SIGNIFICANT CHANGE UP (ref 3.5–5)
PROT SERPL-MCNC: 5.4 G/DL — LOW (ref 6–8)
RBC # BLD: 2.31 M/UL — LOW (ref 4.2–5.4)
RBC # BLD: 2.76 M/UL — LOW (ref 4.2–5.4)
RBC # FLD: 17.1 % — HIGH (ref 11.5–14.5)
RBC # FLD: 17.4 % — HIGH (ref 11.5–14.5)
SODIUM SERPL-SCNC: 141 MMOL/L — SIGNIFICANT CHANGE UP (ref 135–146)
TIBC SERPL-MCNC: 256 UG/DL — SIGNIFICANT CHANGE UP (ref 220–430)
UIBC SERPL-MCNC: 229 UG/DL — SIGNIFICANT CHANGE UP (ref 110–370)
WBC # BLD: 1.71 K/UL — LOW (ref 4.8–10.8)
WBC # BLD: 4.27 K/UL — LOW (ref 4.8–10.8)
WBC # FLD AUTO: 1.71 K/UL — LOW (ref 4.8–10.8)
WBC # FLD AUTO: 4.27 K/UL — LOW (ref 4.8–10.8)

## 2023-04-21 PROCEDURE — 99223 1ST HOSP IP/OBS HIGH 75: CPT

## 2023-04-21 RX ORDER — FOLIC ACID 0.8 MG
1 TABLET ORAL DAILY
Refills: 0 | Status: DISCONTINUED | OUTPATIENT
Start: 2023-04-21 | End: 2023-04-24

## 2023-04-21 RX ORDER — PANTOPRAZOLE SODIUM 20 MG/1
40 TABLET, DELAYED RELEASE ORAL EVERY 12 HOURS
Refills: 0 | Status: DISCONTINUED | OUTPATIENT
Start: 2023-04-21 | End: 2023-04-24

## 2023-04-21 RX ORDER — PANTOPRAZOLE SODIUM 20 MG/1
40 TABLET, DELAYED RELEASE ORAL
Refills: 0 | Status: DISCONTINUED | OUTPATIENT
Start: 2023-04-21 | End: 2023-04-21

## 2023-04-21 RX ORDER — METOPROLOL TARTRATE 50 MG
25 TABLET ORAL
Refills: 0 | Status: DISCONTINUED | OUTPATIENT
Start: 2023-04-21 | End: 2023-04-24

## 2023-04-21 RX ORDER — FUROSEMIDE 40 MG
40 TABLET ORAL DAILY
Refills: 0 | Status: DISCONTINUED | OUTPATIENT
Start: 2023-04-21 | End: 2023-04-24

## 2023-04-21 RX ORDER — SENNA PLUS 8.6 MG/1
2 TABLET ORAL AT BEDTIME
Refills: 0 | Status: DISCONTINUED | OUTPATIENT
Start: 2023-04-21 | End: 2023-04-24

## 2023-04-21 RX ORDER — DIPHENHYDRAMINE HCL 50 MG
25 CAPSULE ORAL ONCE
Refills: 0 | Status: DISCONTINUED | OUTPATIENT
Start: 2023-04-21 | End: 2023-04-21

## 2023-04-21 RX ORDER — POLYETHYLENE GLYCOL 3350 17 G/17G
17 POWDER, FOR SOLUTION ORAL EVERY 12 HOURS
Refills: 0 | Status: DISCONTINUED | OUTPATIENT
Start: 2023-04-21 | End: 2023-04-24

## 2023-04-21 RX ORDER — DIPHENHYDRAMINE HCL 50 MG
50 CAPSULE ORAL ONCE
Refills: 0 | Status: COMPLETED | OUTPATIENT
Start: 2023-04-21 | End: 2023-04-21

## 2023-04-21 RX ADMIN — Medication 25 MILLIGRAM(S): at 17:53

## 2023-04-21 RX ADMIN — PANTOPRAZOLE SODIUM 40 MILLIGRAM(S): 20 TABLET, DELAYED RELEASE ORAL at 06:19

## 2023-04-21 RX ADMIN — PANTOPRAZOLE SODIUM 40 MILLIGRAM(S): 20 TABLET, DELAYED RELEASE ORAL at 17:52

## 2023-04-21 RX ADMIN — POLYETHYLENE GLYCOL 3350 17 GRAM(S): 17 POWDER, FOR SOLUTION ORAL at 06:41

## 2023-04-21 RX ADMIN — Medication 40 MILLIGRAM(S): at 06:19

## 2023-04-21 RX ADMIN — Medication 50 MILLIGRAM(S): at 23:04

## 2023-04-21 RX ADMIN — Medication 1 MILLIGRAM(S): at 11:29

## 2023-04-21 RX ADMIN — Medication 25 MILLIGRAM(S): at 06:19

## 2023-04-21 NOTE — ED ADULT NURSE REASSESSMENT NOTE - NS ED NURSE REASSESS COMMENT FT1
pt spoke with medicine team , pt for one more unit of prbc then d/c home., as per gi no colonoscopy necessary. prbc initiated at 12:55 as per protocol., will continue to monitor, vss.
Pt states she had a bowel movement with blood. V/S stable at this time. pt got 1 unit of blood today at 1300 over 3 hrs as ordered.   pt states she want to go home. MD notified. D/C discontinued.  Awaiting for Md evaluation

## 2023-04-21 NOTE — DISCHARGE NOTE NURSING/CASE MANAGEMENT/SOCIAL WORK - PATIENT PORTAL LINK FT
You can access the FollowMyHealth Patient Portal offered by [facility] by registering at the following website: [email or web address]. By joining [employer]’s FollowMyHealth portal, you will also be able to view your health information using other applications (apps) compatible with our system.

## 2023-04-21 NOTE — DISCHARGE NOTE PROVIDER - NSDCACTIVITY_GEN_ALL_CORE
No restrictions Bathing allowed/Walking - Indoors allowed/No heavy lifting/straining/Walking - Outdoors allowed

## 2023-04-21 NOTE — DISCHARGE NOTE PROVIDER - NSDCFUSCHEDAPPT_GEN_ALL_CORE_FT
Liu Gaston  [facility] PreAdmits  Scheduled Appointment: 04/28/2023    [facility] [place] Phys[facility]  AMBCHEMO SI [address]  Scheduled Appointment: 04/28/2023    David Eisenberg  [facility] [place] Phys[facility]  OTOLARYNG [address]  Scheduled Appointment: 05/04/2023    [facility]  AMBCHEMO SI [address]  Scheduled Appointment: 05/05/2023    Liu Gaston  [facility] PreAdmits  Scheduled Appointment: 05/05/2023    Liu Gaston  [facility] PreAdmits  Scheduled Appointment: 05/11/2023    Liu Gaston  [facility] [place] Physician [facility]  HEMONC SI [address]  Scheduled Appointment: 05/11/2023    Liu Gaston  [facility] PreAdmits  Scheduled Appointment: 05/12/2023    [facility] [place] Physician [facility]  AMBCHEMO SI [address]  Scheduled Appointment: 05/12/2023    Reji Hernandez  [facility] [place] Physician [facility]  CARDIOLOGY [address]  Scheduled Appointment: 06/12/2023

## 2023-04-21 NOTE — DISCHARGE NOTE PROVIDER - NSDCCPCAREPLAN_GEN_ALL_CORE_FT
PRINCIPAL DISCHARGE DIAGNOSIS  Diagnosis: Anemia  Assessment and Plan of Treatment: You were admitted with anemia Hb 5. You received 3 units of blood in total. Gi doctors evaluated you and no plan for EGD/colono now. You need to follow up with your cardiologist as oupatient and then follow up with them again for colono as outpatient      SECONDARY DISCHARGE DIAGNOSES  Diagnosis: GI bleed  Assessment and Plan of Treatment:     Diagnosis: Epigastric pain  Assessment and Plan of Treatment:      PRINCIPAL DISCHARGE DIAGNOSIS  Diagnosis: Anemia  Assessment and Plan of Treatment: You were admitted with anemia Hb 5. You received 3 units of blood in total. Gi doctors evaluated you and no plan for EGD/colono now. You need to follow up with your cardiologist as oupatient and then follow up with them again for colono as outpatient.  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 Please follow up with Dr. Hernandez for valve replacement      SECONDARY DISCHARGE DIAGNOSES  Diagnosis: GI bleed  Assessment and Plan of Treatment:     Diagnosis: Epigastric pain  Assessment and Plan of Treatment:

## 2023-04-21 NOTE — DISCHARGE NOTE PROVIDER - NSDCMRMEDTOKEN_GEN_ALL_CORE_FT
ferrous sulfate 200 mg (65 mg elemental iron) oral tablet: 1 tab(s) orally once a day   folic acid 1 mg oral tablet: 1 tab(s) orally once a day  furosemide 40 mg oral tablet: 1 tab(s) orally once a day  metoprolol tartrate 25 mg oral tablet: 1 tab(s) orally 2 times a day  MiraLax oral powder for reconstitution: 17 gram(s) orally once a day  pantoprazole 40 mg oral delayed release tablet: 1 tab(s) orally 2 times a day  Retacrit 20,000 units/mL injectable solution: 67100 unit(s) injectable once a week

## 2023-04-21 NOTE — CHART NOTE - NSCHARTNOTEFT_GEN_A_CORE
Pt originally planned for DC after transfusion. Notified by RN that pt had blood BM post-transfusion. Per primary team, GI saw pt earlier today and pt reported no melena or hematochezia to them. Pt originally planned for DC after transfusion. Notified by RN that pt had bloody BM post-transfusion. VSS (Hr 83, /62). Per primary team, GI saw pt earlier today and pt reported no melena or hematochezia to them at the time. Will cancel discharge and notify GI that pt now having melena. Will signout CBC at 8pm to night team Pt originally planned for DC after transfusion. Notified by RN that pt had bloody BM post-transfusion. VSS (Hr 83, /62). Per primary team, GI saw pt earlier today and pt reported no melena or hematochezia to them at the time. Will cancel discharge and notify GI that pt now having melena. Will signout CBC at 8pm to night team    UPDATE:  Spoke with pt and her son over factime. Both are agreeable to pt staying overnight. Pt originally planned for DC after transfusion. Notified by RN that pt had bloody BM post-transfusion. VSS (Hr 83, /62). Per primary team, GI saw pt earlier today and pt reported no melena or hematochezia to them at the time. Will cancel discharge and notify GI that pt now having melena. Per GI can check Hgb tomorrow so will order CBC recheck for AM  UPDATE:  Spoke with pt and her son over factime. Both are agreeable to pt staying overnight.

## 2023-04-21 NOTE — H&P ADULT - ASSESSMENT
76 yo F with PMHx of HTN, DLD, CKD 4, Severe AS, Hx of Anemia due to iron deficiency in setting of multiple gastric and duodenal AVMs presents with low Hb.     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 Hernandez for ultimate aortic valve repair, has been put off due to recurrent anemia    DVT ppx: hold  GI ppx: PPI  Diet: DASH  Activity; IAT  DNR/DNI  DIspo: Admit 78 yo F with PMHx of HTN, DLD, CKD 4, Severe AS, Hx of Anemia due to iron deficiency in setting of multiple gastric and duodenal AVMs presents with low Hb.     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 Hernandez for ultimate aortic valve repair, has been put off due to recurrent anemia  - Avoid excess vasodilators, diuretics    DVT ppx: hold  GI ppx: PPI  Diet: DASH  Activity; IAT  DNR/DNI  DIspo: Admit

## 2023-04-21 NOTE — DISCHARGE NOTE PROVIDER - HOSPITAL COURSE
76 yo F with PMHx of HTN, DLD, CKD 4, Severe AS, Hx of Anemia due to iron deficiency in setting of multiple gastric and duodenal AVMs presents with low Hb. Pt has nurse come to her house to draw blood for cbc checks and was found to have Hb in the 4s and was told to come to ED. Pt denies any sx - denies dizziness/lightheadedness, dyspnea, chest discomfort, melena/hematochezia. Only states that she felt "wobbly" prior to presentation.  Pt lives with son and daughter and is entirely independent, cooks for herself and her children.  Pt reports that for the past 3 years she has been having recurrent anemia. 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  In the ED, VSS. Hb 5.2. CT Abd negative for acute pathology.  Patient received 2 blood transfusions Hb increased to 7. Will receive a 3rd unit before DC   GI evaluated patient, no plan for EGD/Colono as in patient given so melena or hematochezia. Patient will follow up with cardio for the AS first due to risk for intubation during EGD/colono .   Patient also follows with Dr. Chacon for pancytopenia. He saw him recently on 4/16 and he recommended to continue with venofer and retacrit.   Patient Hb 7 today, vitals stable. Will receive one more unit and be discharged.    78 yo F with PMHx of HTN, DLD, CKD 4, Severe AS, Hx of Anemia due to iron deficiency in setting of multiple gastric and duodenal AVMs presents with low Hb. Pt has nurse come to her house to draw blood for cbc checks and was found to have Hb in the 4s and was told to come to ED. Pt denies any sx - denies dizziness/lightheadedness, dyspnea, chest discomfort, melena/hematochezia. Only states that she felt "wobbly" prior to presentation.  Pt lives with son and daughter and is entirely independent, cooks for herself and her children.  Pt reports that for the past 3 years she has been having recurrent anemia. 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  In the ED, VSS. Hb 5.2. CT Abd negative for acute pathology.  Patient received 2 blood transfusions Hb increased to 7. Will receive a 3rd unit before DC   GI evaluated patient, no plan for EGD/Colono as in patient given so melena or hematochezia. Patient will follow up with cardio for the AS first due to risk for intubation during EGD/colono .   Patient also follows with Dr. Chacon for pancytopenia. He saw him recently on 4/16 and he recommended to continue with venofer and retacrit.

## 2023-04-21 NOTE — CONSULT NOTE ADULT - ASSESSMENT
76 yo F with PMHx of HTN, DLD, CKD 4, Severe AS, Hx of Anemia due to iron deficiency in setting of multiple gastric and duodenal AVMs presents with low Hb. Pt has nurse come to her house to draw blood for cbc checks and was found to have Hb in the 4s and was told to come to ED.     #)Acute on chronic anemia likely due to AVM sec to sever AS  #)Pancytopenia r/o MDS  -Baseline Hb 7-9 admitted with hb of 5.2 s/p 2 units improved to 7 after that had one more unit PRBC  -Denies any melena or hematochezia  -Having brown stools and OLEKSANDR showed brown stools   -Not on any antiplatelet or anticoagulation   -Has h/o severe AS (high risk for any endoscopic procedures as per cardio)  -h/o multiple EGD and enteroscopies before   -last enteroscopy 4/3/2023: dieulafoy lesion in the dudoenum and jejunum s/p clipping   -Enteroscopy 10/2022: Multiple AVM in antrum, duodenum and jejunum s/p APC  -colonoscopy in 2019 as mentioned above   -Previously refused colonoscopy and also bone marrow biopsy     Recs:   -Trend CBC, Keep Hb>8   -active type and screen   -2 18 gauge   -iron transfusions and blood transfusions as needed  -explained to her about recurrent bleeding likely due to AVM along with risks and benefits of EGD and colonoscopy   -at this time given no overt bleeding and high risk for any endoscopic procedure she would like to defer endoscopic intervention for now  -she has an appointment with cardiology for evaluation of TAVR procedure   -Follow up as an OP with GI     recall as needed

## 2023-04-21 NOTE — H&P ADULT - NSHPPHYSICALEXAM_GEN_ALL_CORE
VITALS:   T(C): 36.5 (04-20-23 @ 16:08), Max: 36.5 (04-20-23 @ 16:08)  HR: 79 (04-20-23 @ 16:08) (79 - 79)  BP: 120/53 (04-20-23 @ 16:08) (120/53 - 120/53)  RR: 16 (04-20-23 @ 16:08) (16 - 16)  SpO2: 99% (04-20-23 @ 16:08) (99% - 99%)    GENERAL: NAD, lying in bed comfortably  HEAD:  Atraumatic, normocephalic  EYES: EOMI, PERRLA, conjunctiva and sclera clear  ENT: Pale mucous membranes  NECK: Supple, no JVD, right upper chest access port, appears clean, intact  HEART: Regular rate and rhythm, 4/6 systolic murmur  LUNGS: Unlabored respirations.  Clear to auscultation bilaterally, no crackles, wheezing, or rhonchi  ABDOMEN: Soft, nontender, nondistended, +BS  EXTREMITIES: No clubbing, cyanosis, or edema  NERVOUS SYSTEM:  A&Ox3

## 2023-04-21 NOTE — CONSULT NOTE ADULT - SUBJECTIVE AND OBJECTIVE BOX
Gastroenterology Consultation:    Patient is a 77y old  Female who presents with a chief complaint of anemia (21 Apr 2023 11:44)      Admitted on: 04-20-23  HPI:  76 yo F with PMHx of HTN, DLD, CKD 4, Severe AS, Hx of Anemia due to iron deficiency in setting of multiple gastric and duodenal AVMs presents with low Hb. Pt has nurse come to her house to draw blood for cbc checks and was found to have Hb in the 4s and was told to come to ED. Pt denies any sx - denies dizziness/lightheadedness, dyspnea, chest discomfort, melena/hematochezia. Only states that she felt "wobbly" prior to presentation.  Pt lives with son and daughter and is entirely independent, cooks for herself and her children.  Pt reports that for the past 3 years she has been having recurrent anemia. 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  In the ED, VSS. Hb 5.2. CT Abd negative for acute pathology. (21 Apr 2023 00:05)      Prior EGD: < from: Enteroscopy (04.03.23 @ 10:30) >  Impressions:    Normal mucosa in the whole esophagus.    Normal mucosa in the whole stomach.    -The bulbar mucosa was nodular. This could be due to underlying lymphoid  hyperplasia. Biopsies taken. .    -An oozing Dieulafoy lesion was seen in the Third part of the duodenum. Three  endoclips were applied to the Third part of the duodenum for the purpose of  hemostasis. .    Abnormal mucosa in the Jejunum (120 cm from incisors).    < end of copied text >    10/22 enteroscopy: Impressions:    Normal mucosa in the whole esophagus.    Angioectasias in the antrum. (Hemostasis).    Multiple small AVMs were noted in duodenal bulb and second part of duodenum.  APCs were applied for the purpose of hemostatsis.    PCF scope for enteroscopy. Proximal jejunum examined. No blood was noted in  jejunum. 2 small non-bleeding AVMs were noted in proximal jejunum. APCs were  applied for the purpose of hemostasis.    Prior Colonoscopy:  < from: Colonoscopy (10.22.19 @ 14:00) >  Impressions:    Moderate diverticulosis of the sigmoid colon, descending colon, cecum, distal  descending colon and ascending colon.    Grade 2 internal hemorrhoids.    Solid stool in some spots throughout colon.     < end of copied text >        PAST MEDICAL & SURGICAL HISTORY:  HTN (hypertension)      Heart murmur      Eczema  Anemia  iron infusions and PRBC transfusions      Aortic stenosis      Chronic kidney disease (CKD)      2019 novel coronavirus disease (COVID-19)  4/2020- [facility] admission      H/O appendicitis      H/O cholecystitis  s/p cholecystectomy          FAMILY HISTORY:  FH: kidney disease (Father)        Social History:  Tobacco: N  Alcohol: N  Drugs: N    Home Medications:  folic acid 1 mg oral tablet: 1 tab(s) orally once a day (21 Mar 2023 23:37)  furosemide 40 mg oral tablet: 1 tab(s) orally once a day (21 Apr 2023 00:01)  metoprolol tartrate 25 mg oral tablet: 1 tab(s) orally 2 times a day (21 Mar 2023 23:37)  MiraLax oral powder for reconstitution: 17 gram(s) orally once a day (21 Mar 2023 23:37)  Retacrit 20,000 units/mL injectable solution: 32287 unit(s) injectable once a week (21 Mar 2023 23:37)    MEDICATIONS  (STANDING):  folic acid 1 milliGRAM(s) Oral daily  furosemide    Tablet 40 milliGRAM(s) Oral daily  metoprolol tartrate 25 milliGRAM(s) Oral two times a day  pantoprazole  Injectable 40 milliGRAM(s) IV Push every 12 hours  polyethylene glycol 3350 17 Gram(s) Oral every 12 hours  senna 2 Tablet(s) Oral at bedtime    MEDICATIONS  (PRN):  bisacodyl 5 milliGRAM(s) Oral every 12 hours PRN Constipation      Allergies  latex (Unknown)  EKG leads (Hives)  penicillins (Rash; Urticaria; Hives; Blisters)  aspirin (Rash; Other)      Review of Systems:   Constitutional:  No Fever, No Chills  ENT/Mouth:  No Hearing Changes,  No Difficulty Swallowing  Eyes:  No Eye Pain, No Vision Changes  Cardiovascular:  No Chest Pain, No Palpitations  Respiratory:  No Cough, No Dyspnea  Gastrointestinal:  As described in HPI  Musculoskeletal:  No Joint Swelling, No Back Pain  Skin:  No Skin Lesions, No Jaundice  Neuro:  No Syncope, No Dizziness  Heme/Lymph:  No Bruising, No Bleeding.          Physical Examination:  T(C): 36.6 (04-21-23 @ 16:13), Max: 36.7 (04-21-23 @ 03:23)  HR: 83 (04-21-23 @ 16:13) (69 - 83)  BP: 137/62 (04-21-23 @ 16:13) (117/56 - 137/62)  RR: 18 (04-21-23 @ 16:13) (16 - 18)  SpO2: 100% (04-21-23 @ 16:13) (95% - 100%)        Constitutional: No acute distress.  Eyes:. Conjunctivae are clear, Sclera is non-icteric.  Ears Nose and Throat: The external ears are normal appearing,  Oral mucosa is pink and moist.  Respiratory:  No signs of respiratory distress. Lung sounds are clear bilaterally.  Cardiovascular:  S1 S2, Regular rate and rhythm.  GI: Abdomen is soft, symmetric, and non-tender without distention. There are no visible lesions or scars. Bowel sounds are present and normoactive in all four quadrants. No masses, hepatomegaly, or splenomegaly are noted.   Neuro: No Tremor, No involuntary movements  Skin: No rashes, No Jaundice.          Data:                        7.0    1.71  )-----------( 119      ( 21 Apr 2023 07:23 )             22.0     Hgb Trend:  7.0  04-21-23 @ 07:23  5.2  04-20-23 @ 17:27      04-21    141  |  112<H>  |  75<HH>  ----------------------------<  175<H>  4.8   |  20  |  2.9<H>    Ca    8.3<L>      21 Apr 2023 07:23  Phos  2.9     04-21  Mg     2.4     04-21    TPro  5.4<L>  /  Alb  3.1<L>  /  TBili  0.8  /  DBili  x   /  AST  11  /  ALT  5   /  AlkPhos  73  04-21    Liver panel trend:  TBili 0.8   /   AST 11   /   ALT 5   /   AlkP 73   /   Tptn 5.4   /   Alb 3.1    /   DBili --      04-21  TBili 0.4   /   AST 12   /   ALT 5   /   AlkP 71   /   Tptn 5.3   /   Alb 3.2    /   DBili --      04-20      PT/INR - ( 20 Apr 2023 16:48 )   PT: 11.30 sec;   INR: 0.99 ratio         PTT - ( 20 Apr 2023 16:48 )  PTT:60.3 sec        Radiology:  CT Abdomen and Pelvis No Cont:   ACC: [id number] EXAM:  CT ABDOMEN AND PELVIS   ORDERED BY: SAHRA AQUINO     PROCEDURE DATE:  04/20/2023          INTERPRETATION:  CLINICAL HISTORY / REASON FOR EXAM: Epigastric pain.    TECHNIQUE: Contiguous axial CT images were obtained from the lower chest   to the pubic symphysis without intravenous contrast. Oral contrast was   not administered. Reformatted images in the coronal and sagittal planes   were acquired.    COMPARISON CT: CT abdomen and pelvis from September 6, 2022    OTHER STUDIES USED FOR CORRELATION: None.      FINDINGS:    LOWER CHEST: Partially visualized Port-A-Cath. Respiratory motion,   limiting evaluation for small nodules. Mosaic attenuation of the   visualized bilateral upper and lower lobes. Cardiomegaly.    HEPATOBILIARY: Post cholecystectomy.    SPLEEN: Unremarkable.    PANCREAS: Unremarkable.    ADRENAL GLANDS: Stable left adrenal gland nodule.    KIDNEYS: Bilateral renal cortical atrophy. No evidence of hydronephrosis.   Renal cysts and other subcentimeterhypodensities, too small to further   characterize.    ABDOMINOPELVIC NODES: Multiple scattered prominent mesenteric lymph nodes.    PELVIC ORGANS: Fibroid uterus.    PERITONEUM/MESENTERY/BOWEL: Sigmoid diverticulosis. No bowel obstruction,   ascitesor intraperitoneal free air.    BONES/SOFT TISSUES: Diffuse osteopenia. Degenerative changes of the   thoracolumbar spine. Small fat-containing umbilical hernia. No   retroperitoneal hematoma.    VASCULAR: Aorta is normal in caliber.      IMPRESSION:    No CT evidence of acute intra-abdominal pathology.    --- End of Report ---            JANIYA JASSO MD; Attending Radiologist  This document has been electronically signed. Apr 20 2023  9:22PM (04-20-23 @ 21:09)

## 2023-04-21 NOTE — DISCHARGE NOTE PROVIDER - CARE PROVIDER_API CALL
Noam Rodriguez)  Gastroenterology; Internal Medicine  [address] [place] [address]  Phone: [phone]  Fax: [phone]  Follow Up Time: 1 month

## 2023-04-21 NOTE — H&P ADULT - NSHPLABSRESULTS_GEN_ALL_CORE
LABS:               5.2    3.39  )-----------( 117      ( 20 Apr 2023 17:27 )             16.7     04-20    140  |  108  |  72<HH>  ----------------------------<  99  4.4   |  22  |  2.8<H>    Ca    8.3<L>      20 Apr 2023 17:27    TPro  5.3<L>  /  Alb  3.2<L>  /  TBili  0.4  /  DBili  x   /  AST  12  /  ALT  5   /  AlkPhos  71  04-20    PT/INR - ( 20 Apr 2023 16:48 )   PT: 11.30 sec;   INR: 0.99 ratio         PTT - ( 20 Apr 2023 16:48 )  PTT:60.3 sec    < from: CT Abdomen and Pelvis No Cont (04.20.23 @ 21:09) >      IMPRESSION:    No CT evidence of acute intra-abdominal pathology.    < end of copied text >

## 2023-04-21 NOTE — CONSULT NOTE ADULT - ATTENDING COMMENTS
DISPLAY PLAN FREE TEXT I edited the note DISPLAY PLAN FREE TEXT DISPLAY PLAN FREE TEXT DISPLAY PLAN FREE TEXT DISPLAY PLAN FREE TEXT

## 2023-04-21 NOTE — H&P ADULT - HISTORY OF PRESENT ILLNESS
78 yo F with PMHx of HTN, DLD, CKD 4, Severe AS, Hx of Anemia due to iron deficiency in setting of multiple gastric and duodenal AVMs presents with low Hb. Pt has nurse come to her house to draw blood for cbc checks and was found to have Hb in the 4s and was told to come to ED. Pt denies any sx - denies dizziness/lightheadedness, dyspnea, chest discomfort, melena/hematochezia. Only states that she felt "wobbly" prior to presentation.  Pt lives with son and daughter and is entirely independent, cooks for herself and her children.  Pt reports that for the past 3 years she has been having recurrent anemia. 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  In the ED, VSS. Hb 5.2. CT Abd negative for acute pathology.

## 2023-04-22 LAB
ALBUMIN SERPL ELPH-MCNC: 3.2 G/DL — LOW (ref 3.5–5.2)
ALP SERPL-CCNC: 65 U/L — SIGNIFICANT CHANGE UP (ref 30–115)
ALT FLD-CCNC: 6 U/L — SIGNIFICANT CHANGE UP (ref 0–41)
ANION GAP SERPL CALC-SCNC: 10 MMOL/L — SIGNIFICANT CHANGE UP (ref 7–14)
AST SERPL-CCNC: 11 U/L — SIGNIFICANT CHANGE UP (ref 0–41)
BASOPHILS # BLD AUTO: 0.01 K/UL — SIGNIFICANT CHANGE UP (ref 0–0.2)
BASOPHILS # BLD AUTO: 0.01 K/UL — SIGNIFICANT CHANGE UP (ref 0–0.2)
BASOPHILS NFR BLD AUTO: 0.2 % — SIGNIFICANT CHANGE UP (ref 0–1)
BASOPHILS NFR BLD AUTO: 0.3 % — SIGNIFICANT CHANGE UP (ref 0–1)
BILIRUB SERPL-MCNC: 0.9 MG/DL — SIGNIFICANT CHANGE UP (ref 0.2–1.2)
BUN SERPL-MCNC: 82 MG/DL — CRITICAL HIGH (ref 10–20)
CALCIUM SERPL-MCNC: 8.4 MG/DL — SIGNIFICANT CHANGE UP (ref 8.4–10.5)
CHLORIDE SERPL-SCNC: 111 MMOL/L — HIGH (ref 98–110)
CO2 SERPL-SCNC: 23 MMOL/L — SIGNIFICANT CHANGE UP (ref 17–32)
CREAT SERPL-MCNC: 2.9 MG/DL — HIGH (ref 0.7–1.5)
EGFR: 16 ML/MIN/1.73M2 — LOW
EOSINOPHIL # BLD AUTO: 0.05 K/UL — SIGNIFICANT CHANGE UP (ref 0–0.7)
EOSINOPHIL # BLD AUTO: 0.25 K/UL — SIGNIFICANT CHANGE UP (ref 0–0.7)
EOSINOPHIL NFR BLD AUTO: 1.3 % — SIGNIFICANT CHANGE UP (ref 0–8)
EOSINOPHIL NFR BLD AUTO: 5.4 % — SIGNIFICANT CHANGE UP (ref 0–8)
GLUCOSE SERPL-MCNC: 100 MG/DL — HIGH (ref 70–99)
HCT VFR BLD CALC: 23.1 % — LOW (ref 37–47)
HCT VFR BLD CALC: 26.4 % — LOW (ref 37–47)
HGB BLD-MCNC: 7.3 G/DL — LOW (ref 12–16)
HGB BLD-MCNC: 8.7 G/DL — LOW (ref 12–16)
IMM GRANULOCYTES NFR BLD AUTO: 0.3 % — SIGNIFICANT CHANGE UP (ref 0.1–0.3)
IMM GRANULOCYTES NFR BLD AUTO: 0.4 % — HIGH (ref 0.1–0.3)
LYMPHOCYTES # BLD AUTO: 0.52 K/UL — LOW (ref 1.2–3.4)
LYMPHOCYTES # BLD AUTO: 0.7 K/UL — LOW (ref 1.2–3.4)
LYMPHOCYTES # BLD AUTO: 13.1 % — LOW (ref 20.5–51.1)
LYMPHOCYTES # BLD AUTO: 15.2 % — LOW (ref 20.5–51.1)
MAGNESIUM SERPL-MCNC: 2.6 MG/DL — HIGH (ref 1.8–2.4)
MCHC RBC-ENTMCNC: 29.1 PG — SIGNIFICANT CHANGE UP (ref 27–31)
MCHC RBC-ENTMCNC: 29.9 PG — SIGNIFICANT CHANGE UP (ref 27–31)
MCHC RBC-ENTMCNC: 31.6 G/DL — LOW (ref 32–37)
MCHC RBC-ENTMCNC: 33 G/DL — SIGNIFICANT CHANGE UP (ref 32–37)
MCV RBC AUTO: 90.7 FL — SIGNIFICANT CHANGE UP (ref 81–99)
MCV RBC AUTO: 92 FL — SIGNIFICANT CHANGE UP (ref 81–99)
MONOCYTES # BLD AUTO: 0.21 K/UL — SIGNIFICANT CHANGE UP (ref 0.1–0.6)
MONOCYTES # BLD AUTO: 0.4 K/UL — SIGNIFICANT CHANGE UP (ref 0.1–0.6)
MONOCYTES NFR BLD AUTO: 5.3 % — SIGNIFICANT CHANGE UP (ref 1.7–9.3)
MONOCYTES NFR BLD AUTO: 8.7 % — SIGNIFICANT CHANGE UP (ref 1.7–9.3)
NEUTROPHILS # BLD AUTO: 3.17 K/UL — SIGNIFICANT CHANGE UP (ref 1.4–6.5)
NEUTROPHILS # BLD AUTO: 3.24 K/UL — SIGNIFICANT CHANGE UP (ref 1.4–6.5)
NEUTROPHILS NFR BLD AUTO: 70.1 % — SIGNIFICANT CHANGE UP (ref 42.2–75.2)
NEUTROPHILS NFR BLD AUTO: 79.7 % — HIGH (ref 42.2–75.2)
NRBC # BLD: 0 /100 WBCS — SIGNIFICANT CHANGE UP (ref 0–0)
NRBC # BLD: 0 /100 WBCS — SIGNIFICANT CHANGE UP (ref 0–0)
PLATELET # BLD AUTO: 126 K/UL — LOW (ref 130–400)
PLATELET # BLD AUTO: 126 K/UL — LOW (ref 130–400)
PMV BLD: 10.4 FL — SIGNIFICANT CHANGE UP (ref 7.4–10.4)
PMV BLD: 10.4 FL — SIGNIFICANT CHANGE UP (ref 7.4–10.4)
POTASSIUM SERPL-MCNC: 4.7 MMOL/L — SIGNIFICANT CHANGE UP (ref 3.5–5)
POTASSIUM SERPL-SCNC: 4.7 MMOL/L — SIGNIFICANT CHANGE UP (ref 3.5–5)
PROT SERPL-MCNC: 5.5 G/DL — LOW (ref 6–8)
RBC # BLD: 2.51 M/UL — LOW (ref 4.2–5.4)
RBC # BLD: 2.91 M/UL — LOW (ref 4.2–5.4)
RBC # FLD: 17.9 % — HIGH (ref 11.5–14.5)
RBC # FLD: 18.3 % — HIGH (ref 11.5–14.5)
SODIUM SERPL-SCNC: 144 MMOL/L — SIGNIFICANT CHANGE UP (ref 135–146)
WBC # BLD: 3.97 K/UL — LOW (ref 4.8–10.8)
WBC # BLD: 4.62 K/UL — LOW (ref 4.8–10.8)
WBC # FLD AUTO: 3.97 K/UL — LOW (ref 4.8–10.8)
WBC # FLD AUTO: 4.62 K/UL — LOW (ref 4.8–10.8)

## 2023-04-22 PROCEDURE — 99233 SBSQ HOSP IP/OBS HIGH 50: CPT

## 2023-04-22 RX ADMIN — Medication 1 MILLIGRAM(S): at 13:12

## 2023-04-22 RX ADMIN — Medication 25 MILLIGRAM(S): at 17:46

## 2023-04-22 RX ADMIN — PANTOPRAZOLE SODIUM 40 MILLIGRAM(S): 20 TABLET, DELAYED RELEASE ORAL at 05:45

## 2023-04-22 RX ADMIN — Medication 25 MILLIGRAM(S): at 05:45

## 2023-04-22 RX ADMIN — Medication 40 MILLIGRAM(S): at 05:45

## 2023-04-22 RX ADMIN — PANTOPRAZOLE SODIUM 40 MILLIGRAM(S): 20 TABLET, DELAYED RELEASE ORAL at 17:46

## 2023-04-22 NOTE — PROGRESS NOTE ADULT - SUBJECTIVE AND OBJECTIVE BOX
Patient is a 77y old  Female who presents with a chief complaint of anemia (21 Apr 2023 17:09)    HPI:  78 yo F with PMHx of HTN, DLD, CKD 4, Severe AS, Hx of Anemia due to iron deficiency in setting of multiple gastric and duodenal AVMs presents with low Hb. Pt has nurse come to her house to draw blood for cbc checks and was found to have Hb in the 4s and was told to come to ED. Pt denies any sx - denies dizziness/lightheadedness, dyspnea, chest discomfort, melena/hematochezia. Only states that she felt "wobbly" prior to presentation.  Pt lives with son and daughter and is entirely independent, cooks for herself and her children.  Pt reports that for the past 3 years she has been having recurrent anemia. 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  In the ED, VSS. Hb 5.2. CT Abd negative for acute pathology. (21 Apr 2023 00:05)    PAST MEDICAL & SURGICAL HISTORY:  HTN (hypertension  Eczema  Anemia  iron infusions and PRBC transfusions  Aortic stenosis  Chronic kidney disease (CKD)  H/O cholecystitis  s/p cholecystectomy    patient seen and examined independently on morning rounds for the first time today, chart reviewed and discussed with the medicine resident and on interdisciplinary rounds:    overnight had single large episode of bright red blood with bowel movement--awaiting additional unit of prbc now--she is eager to go home (lives with son and granddaughter) but explained in setting of acute GI bleed and worsening anemia cannot discharge at this time (she may consider leaving AMA)    pcp- Dr. Munir Paula  Cardio- Dr. Hernandez        Vital Signs Last 24 Hrs  T(C): 36.4 (22 Apr 2023 13:26), Max: 36.7 (21 Apr 2023 19:21)  T(F): 97.5 (22 Apr 2023 13:26), Max: 98 (21 Apr 2023 19:21)  HR: 60 (22 Apr 2023 13:26) (56 - 83)  BP: 120/60 (22 Apr 2023 13:26) (110/56 - 137/62)  BP(mean): --  RR: 18 (22 Apr 2023 13:26) (18 - 18)  SpO2: 98% (22 Apr 2023 13:26) (98% - 100%)    Parameters below as of 22 Apr 2023 13:26  Patient On (Oxygen Delivery Method): room air    PE:  GEN-NAD, AAOx4, +pallor  PULM- Clear to auscultation bilaterallly  CVS- +s1/s2 RRR +KATHYA  GI- soft obese, minimal lower abdomen ttp but no rebound or guarding- +BS   EXT- no edema                          7.3    3.97  )-----------( 126      ( 22 Apr 2023 06:15 )             23.1     04-22    144  |  111<H>  |  82<HH>  ----------------------------<  100<H>  4.7   |  23  |  2.9<H>    Ca    8.4      22 Apr 2023 06:15  Phos  2.9     04-21  Mg     2.6     04-22    TPro  5.5<L>  /  Alb  3.2<L>  /  TBili  0.9  /  DBili  x   /  AST  11  /  ALT  6   /  AlkPhos  65  04-22    CARDIAC MARKERS ( 20 Apr 2023 17:27 )  x     / <0.01 ng/mL / x     / x     / x                MEDICATIONS  (STANDING):  folic acid 1 milliGRAM(s) Oral daily  furosemide    Tablet 40 milliGRAM(s) Oral daily  metoprolol tartrate 25 milliGRAM(s) Oral two times a day  pantoprazole  Injectable 40 milliGRAM(s) IV Push every 12 hours  polyethylene glycol 3350 17 Gram(s) Oral every 12 hours  senna 2 Tablet(s) Oral at bedtime

## 2023-04-22 NOTE — PROGRESS NOTE ADULT - ASSESSMENT
a/p:  #Acute on chronic iron deficiency Anemia 2/2 GI Bleed  #h/o multiple prior gastric and duodenal AVM s/p clipping   #pancytopenia  -monitor cbc q12 hr---transfuse if hb <7.5   -getting additional 1 u prbc today (due to drop in hb overnight after acute bloody bowel movement)  -hb 7.3 today  -GI follow up  -as per chart she has previously refused bm biopsy  -heme following (Dr. Gaston)--follows weekly at [facility] where she was found to have Hb 5.0 this week  -monitor vs closely  -protonix 40 mg iv q12 hr    #CKD Stage 3  -cr 2.9 today  -monitor bmp  -f/u with nephrology as otuatpient    #Severe AS  #HTN/HLD  -outaptient cardio f/u for TAVR  -continue current managment    DVT/GI ppx  guarded prognosis    FULL CODE     #Progress Note Handoff  Pending (specify):  transfuse 1 u prbc, post-transfusion cbc, GI f/u  Family disscussion: d/w patient herself bedside  Disposition: Home_x (when medically stable)---patient may refuse to stay in hospital and want to leave after she gets transfusion--in setting of acute GIbleed with bloody bowel movement overnight and worsening anemia would not discharge patient today     Total time spent to complete patient's bedside assessment, review medical chart, discuss medical plan of care with covering medical team was more than 50 minutes  with >50% of time spendt face to face with patient, discussion with patient/family and/or coordination of care a/p:  #Acute on chronic iron deficiency Anemia 2/2 GI Bleed  #h/o multiple prior gastric and duodenal AVM s/p clipping   #pancytopenia  -monitor cbc q12 hr---transfuse if hb <7.5   -getting additional 1 u prbc today (due to drop in hb overnight after acute bloody bowel movement)  -hb 7.3 today  -GI follow up  -as per chart she has previously refused bm biopsy  -heme following (Dr. Gaston)--follows weekly at [facility] where she was found to have Hb 5.0 this week  -monitor vs closely  -protonix 40 mg iv q12 hr    #CKD Stage 4  -cr 2.9 today  -monitor bmp  -f/u with nephrology as otuatpient    #Severe AS  #HTN/HLD  -outaptient cardio f/u for TAVR  -continue current managment    DVT/GI ppx  guarded prognosis    FULL CODE     #Progress Note Handoff  Pending (specify):  transfuse 1 u prbc, post-transfusion cbc, GI f/u  Family disscussion: d/w patient herself bedside  Disposition: Home_x (when medically stable)---patient may refuse to stay in hospital and want to leave after she gets transfusion--in setting of acute GIbleed with bloody bowel movement overnight and worsening anemia would not discharge patient today     Total time spent to complete patient's bedside assessment, review medical chart, discuss medical plan of care with covering medical team was more than 50 minutes  with >50% of time spendt face to face with patient, discussion with patient/family and/or coordination of care

## 2023-04-22 NOTE — PATIENT PROFILE ADULT - FALL HARM RISK - HARM RISK INTERVENTIONS
Assistance with ambulation/Assistance OOB with selected safe patient handling equipment/Communicate Risk of Fall with Harm to all staff/Discuss with provider need for PT consult/Monitor gait and stability/Provide patient with walking aids - walker, cane, crutches/Reinforce activity limits and safety measures with patient and family/Sit up slowly, dangle for a short time, stand at bedside before walking/Tailored Fall Risk Interventions/Visual Cue: Yellow wristband and red socks/Bed in lowest position, wheels locked, appropriate side rails in place/Call bell, personal items and telephone in reach/Instruct patient to call for assistance before getting out of bed or chair/Non-slip footwear when patient is out of bed/[place] to call system/Physically safe environment - no spills, clutter or unnecessary equipment/Purposeful Proactive Rounding/Room/bathroom lighting operational, light cord in reach

## 2023-04-23 LAB
ANION GAP SERPL CALC-SCNC: 11 MMOL/L — SIGNIFICANT CHANGE UP (ref 7–14)
BASOPHILS # BLD AUTO: 0.01 K/UL — SIGNIFICANT CHANGE UP (ref 0–0.2)
BASOPHILS NFR BLD AUTO: 0.3 % — SIGNIFICANT CHANGE UP (ref 0–1)
BUN SERPL-MCNC: 78 MG/DL — CRITICAL HIGH (ref 10–20)
CALCIUM SERPL-MCNC: 8.1 MG/DL — LOW (ref 8.4–10.4)
CHLORIDE SERPL-SCNC: 110 MMOL/L — SIGNIFICANT CHANGE UP (ref 98–110)
CO2 SERPL-SCNC: 22 MMOL/L — SIGNIFICANT CHANGE UP (ref 17–32)
CREAT SERPL-MCNC: 3 MG/DL — HIGH (ref 0.7–1.5)
EGFR: 15 ML/MIN/1.73M2 — LOW
EOSINOPHIL # BLD AUTO: 0.31 K/UL — SIGNIFICANT CHANGE UP (ref 0–0.7)
EOSINOPHIL NFR BLD AUTO: 7.8 % — SIGNIFICANT CHANGE UP (ref 0–8)
GLUCOSE SERPL-MCNC: 90 MG/DL — SIGNIFICANT CHANGE UP (ref 70–99)
HCT VFR BLD CALC: 26.7 % — LOW (ref 37–47)
HCT VFR BLD CALC: 26.7 % — LOW (ref 37–47)
HGB BLD-MCNC: 8.4 G/DL — LOW (ref 12–16)
HGB BLD-MCNC: 8.5 G/DL — LOW (ref 12–16)
IMM GRANULOCYTES NFR BLD AUTO: 0.3 % — SIGNIFICANT CHANGE UP (ref 0.1–0.3)
LYMPHOCYTES # BLD AUTO: 0.66 K/UL — LOW (ref 1.2–3.4)
LYMPHOCYTES # BLD AUTO: 16.6 % — LOW (ref 20.5–51.1)
MCHC RBC-ENTMCNC: 29 PG — SIGNIFICANT CHANGE UP (ref 27–31)
MCHC RBC-ENTMCNC: 29.5 PG — SIGNIFICANT CHANGE UP (ref 27–31)
MCHC RBC-ENTMCNC: 31.5 G/DL — LOW (ref 32–37)
MCHC RBC-ENTMCNC: 31.8 G/DL — LOW (ref 32–37)
MCV RBC AUTO: 92.1 FL — SIGNIFICANT CHANGE UP (ref 81–99)
MCV RBC AUTO: 92.7 FL — SIGNIFICANT CHANGE UP (ref 81–99)
MONOCYTES # BLD AUTO: 0.4 K/UL — SIGNIFICANT CHANGE UP (ref 0.1–0.6)
MONOCYTES NFR BLD AUTO: 10.1 % — HIGH (ref 1.7–9.3)
NEUTROPHILS # BLD AUTO: 2.58 K/UL — SIGNIFICANT CHANGE UP (ref 1.4–6.5)
NEUTROPHILS NFR BLD AUTO: 64.9 % — SIGNIFICANT CHANGE UP (ref 42.2–75.2)
NRBC # BLD: 0 /100 WBCS — SIGNIFICANT CHANGE UP (ref 0–0)
NRBC # BLD: 0 /100 WBCS — SIGNIFICANT CHANGE UP (ref 0–0)
PLATELET # BLD AUTO: 128 K/UL — LOW (ref 130–400)
PLATELET # BLD AUTO: 133 K/UL — SIGNIFICANT CHANGE UP (ref 130–400)
PMV BLD: 10.1 FL — SIGNIFICANT CHANGE UP (ref 7.4–10.4)
PMV BLD: 10.5 FL — HIGH (ref 7.4–10.4)
POTASSIUM SERPL-MCNC: 4.8 MMOL/L — SIGNIFICANT CHANGE UP (ref 3.5–5)
POTASSIUM SERPL-SCNC: 4.8 MMOL/L — SIGNIFICANT CHANGE UP (ref 3.5–5)
RBC # BLD: 2.88 M/UL — LOW (ref 4.2–5.4)
RBC # BLD: 2.9 M/UL — LOW (ref 4.2–5.4)
RBC # FLD: 18.3 % — HIGH (ref 11.5–14.5)
RBC # FLD: 18.6 % — HIGH (ref 11.5–14.5)
SODIUM SERPL-SCNC: 143 MMOL/L — SIGNIFICANT CHANGE UP (ref 135–146)
WBC # BLD: 3.72 K/UL — LOW (ref 4.8–10.8)
WBC # BLD: 3.97 K/UL — LOW (ref 4.8–10.8)
WBC # FLD AUTO: 3.72 K/UL — LOW (ref 4.8–10.8)
WBC # FLD AUTO: 3.97 K/UL — LOW (ref 4.8–10.8)

## 2023-04-23 PROCEDURE — 99233 SBSQ HOSP IP/OBS HIGH 50: CPT

## 2023-04-23 RX ADMIN — Medication 40 MILLIGRAM(S): at 05:59

## 2023-04-23 RX ADMIN — PANTOPRAZOLE SODIUM 40 MILLIGRAM(S): 20 TABLET, DELAYED RELEASE ORAL at 17:24

## 2023-04-23 RX ADMIN — Medication 25 MILLIGRAM(S): at 06:00

## 2023-04-23 RX ADMIN — Medication 1 MILLIGRAM(S): at 11:09

## 2023-04-23 RX ADMIN — Medication 5 MILLIGRAM(S): at 11:06

## 2023-04-23 RX ADMIN — POLYETHYLENE GLYCOL 3350 17 GRAM(S): 17 POWDER, FOR SOLUTION ORAL at 17:24

## 2023-04-23 RX ADMIN — Medication 25 MILLIGRAM(S): at 17:24

## 2023-04-23 RX ADMIN — PANTOPRAZOLE SODIUM 40 MILLIGRAM(S): 20 TABLET, DELAYED RELEASE ORAL at 05:59

## 2023-04-23 NOTE — PROGRESS NOTE ADULT - SUBJECTIVE AND OBJECTIVE BOX
CHIEF COMPLAINT:    Patient is a 77y old  Female who presents with a chief complaint of anemia     INTERVAL HPI/OVERNIGHT EVENTS:    Patient seen and examined at bedside. No acute overnight events occurred.    ROS: Denies SOB, chest pain, dizziness. All other systems are negative.    Medications:  Standing  folic acid 1 milliGRAM(s) Oral daily  furosemide    Tablet 40 milliGRAM(s) Oral daily  metoprolol tartrate 25 milliGRAM(s) Oral two times a day  pantoprazole  Injectable 40 milliGRAM(s) IV Push every 12 hours  polyethylene glycol 3350 17 Gram(s) Oral every 12 hours  senna 2 Tablet(s) Oral at bedtime    PRN Meds  bisacodyl 5 milliGRAM(s) Oral every 12 hours PRN        Vital Signs:    T(F): 97.5 (23 @ 13:34), Max: 97.5 (23 @ 13:34)  HR: 69 (23 @ 13:34) (62 - 69)  BP: 118/56 (23 @ 13:34) (112/58 - 153/67)  RR: 18 (23 @ 13:34) (18 - 18)  SpO2: 98% (23 @ 05:00) (96% - 98%)  I&O's Summary    2023 07:  -  2023 07:00  --------------------------------------------------------  IN: 300 mL / OUT: 450 mL / NET: -150 mL    2023 07:  -  2023 15:36  --------------------------------------------------------  IN: 940 mL / OUT: 1000 mL / NET: -60 mL      Daily Height in cm: 167.64 (2023 20:07)    Daily Weight in k.1 (2023 05:00)  CAPILLARY BLOOD GLUCOSE          PHYSICAL EXAM:  GENERAL:  NAD  SKIN: No rashes or lesions  HEENT: Atraumatic. Normocephalic. Anicteric  NECK:  No JVD.   PULMONARY: Clear to ausculation bilaterally. No wheezing. No rales  CVS: Normal S1, S2. Regular rate and rhythm. + murmurs.  ABDOMEN/GI: Soft, Nontender, Nondistended; Bowel sounds are present  EXTREMITIES:  No edema B/L LE.  NEUROLOGIC:  No motor deficit.  PSYCH: Alert & oriented x 3, normal affect      LABS:                        8.4    3.97  )-----------( 128      ( 2023 05:42 )             26.7         143  |  110  |  78<HH>  ----------------------------<  90  4.8   |  22  |  3.0<H>    Ca    8.1<L>      2023 05:42  Mg     2.6         TPro  5.5<L>  /  Alb  3.2<L>  /  TBili  0.9  /  DBili  x   /  AST  11  /  ALT  6   /  AlkPhos  65  -        Trop <0.01, CKMB --, CK --, 23 @ 17:27        RADIOLOGY & ADDITIONAL TESTS:  Imaging or report Personally Reviewed:  [ ] YES  [ ] NO -->no new images    Telemetry reviewed independently - NSR, no acute events  EKG reviewed independently -->no new EKGs    Consultant(s) Notes Reviewed:  [ ] YES  [ ] NO  Care Discussed with Consultants/Other Providers [ ] YES  [ ] NO    Case discussed with resident  Care discussed with pt

## 2023-04-23 NOTE — PROGRESS NOTE ADULT - ASSESSMENT
76 yo F PMHx of HTN, DLD, CKD 4, Severe AS, Hx of Anemia due to iron deficiency in setting of multiple gastric and duodenal AVMs presents with low Hb. Pt has visiting nurse come to her house to draw blood for cbc checks and was found to have Hb in the 4s and was told to come to ED. Pt denies any sx - denies dizziness/lightheadedness, dyspnea, chest discomfort, melena/hematochezia. Only states that she felt "wobbly" prior to presentation.    Pt reports that for the past 3 years she has been having recurrent anemia. 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      Acute on chronic iron deficiency and blood loss Anemia from AVMs  Multiple prior gastric and duodenal AVM s/p clipping   - EGD/colonoscopy offerred. Pt unsure if she want repeat testing but family does  - pt and family to think about it today and decide. 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 Gaston)--follows weekly at [facility]       CKD Stage 4  - Stable    Severe AS  HTN/HLD  -outaptient cardio f/u for TAVR--has appt in 6/2023 with Dr. Hernandez  -continue current managment    DVT/GI ppx  guarded prognosis    FULL CODE     #Progress Note Handoff  Pending (specify):  transfuse 1 u prbc, post-transfusion cbc, GI f/u  Family discussion d/w patient and her son at bedside. We reviewed options including CMO, continued transfusions PRN without egd/colonscopy aware that this is a poor opition as pt likely to exsanguinate without intervention) and undergoing EGD/colonscopy (aware of high risk for frequent anesthesia in setting of severe AS). They requested to consider options and will decide by AM. Pt anticipate for dc tomorrow if no conblusion is made and hgb is stable.   Disposition: Home_x

## 2023-04-24 VITALS
RESPIRATION RATE: 18 BRPM | TEMPERATURE: 97 F | HEART RATE: 68 BPM | OXYGEN SATURATION: 99 % | SYSTOLIC BLOOD PRESSURE: 121 MMHG | DIASTOLIC BLOOD PRESSURE: 56 MMHG

## 2023-04-24 LAB
ANION GAP SERPL CALC-SCNC: 11 MMOL/L — SIGNIFICANT CHANGE UP (ref 7–14)
BASOPHILS # BLD AUTO: 0.01 K/UL — SIGNIFICANT CHANGE UP (ref 0–0.2)
BASOPHILS NFR BLD AUTO: 0.3 % — SIGNIFICANT CHANGE UP (ref 0–1)
BUN SERPL-MCNC: 77 MG/DL — CRITICAL HIGH (ref 10–20)
CALCIUM SERPL-MCNC: 8.3 MG/DL — LOW (ref 8.4–10.4)
CHLORIDE SERPL-SCNC: 112 MMOL/L — HIGH (ref 98–110)
CO2 SERPL-SCNC: 22 MMOL/L — SIGNIFICANT CHANGE UP (ref 17–32)
CREAT SERPL-MCNC: 2.7 MG/DL — HIGH (ref 0.7–1.5)
EGFR: 18 ML/MIN/1.73M2 — LOW
EOSINOPHIL # BLD AUTO: 0.36 K/UL — SIGNIFICANT CHANGE UP (ref 0–0.7)
EOSINOPHIL NFR BLD AUTO: 10.8 % — HIGH (ref 0–8)
GLUCOSE SERPL-MCNC: 96 MG/DL — SIGNIFICANT CHANGE UP (ref 70–99)
HCT VFR BLD CALC: 26.4 % — LOW (ref 37–47)
HGB BLD-MCNC: 8.3 G/DL — LOW (ref 12–16)
IMM GRANULOCYTES NFR BLD AUTO: 0.3 % — SIGNIFICANT CHANGE UP (ref 0.1–0.3)
LYMPHOCYTES # BLD AUTO: 0.59 K/UL — LOW (ref 1.2–3.4)
LYMPHOCYTES # BLD AUTO: 17.7 % — LOW (ref 20.5–51.1)
MANUAL DIF COMMENT BLD-IMP: SIGNIFICANT CHANGE UP
MCHC RBC-ENTMCNC: 29.1 PG — SIGNIFICANT CHANGE UP (ref 27–31)
MCHC RBC-ENTMCNC: 31.4 G/DL — LOW (ref 32–37)
MCV RBC AUTO: 92.6 FL — SIGNIFICANT CHANGE UP (ref 81–99)
MONOCYTES # BLD AUTO: 0.35 K/UL — SIGNIFICANT CHANGE UP (ref 0.1–0.6)
MONOCYTES NFR BLD AUTO: 10.5 % — HIGH (ref 1.7–9.3)
NEUTROPHILS # BLD AUTO: 2.01 K/UL — SIGNIFICANT CHANGE UP (ref 1.4–6.5)
NEUTROPHILS NFR BLD AUTO: 60.4 % — SIGNIFICANT CHANGE UP (ref 42.2–75.2)
NRBC # BLD: 0 /100 WBCS — SIGNIFICANT CHANGE UP (ref 0–0)
PLATELET # BLD AUTO: 125 K/UL — LOW (ref 130–400)
PMV BLD: 10.5 FL — HIGH (ref 7.4–10.4)
POTASSIUM SERPL-MCNC: 4.7 MMOL/L — SIGNIFICANT CHANGE UP (ref 3.5–5)
POTASSIUM SERPL-SCNC: 4.7 MMOL/L — SIGNIFICANT CHANGE UP (ref 3.5–5)
RBC # BLD: 2.85 M/UL — LOW (ref 4.2–5.4)
RBC # FLD: 17.8 % — HIGH (ref 11.5–14.5)
SODIUM SERPL-SCNC: 145 MMOL/L — SIGNIFICANT CHANGE UP (ref 135–146)
WBC # BLD: 3.33 K/UL — LOW (ref 4.8–10.8)
WBC # FLD AUTO: 3.33 K/UL — LOW (ref 4.8–10.8)

## 2023-04-24 PROCEDURE — 99238 HOSP IP/OBS DSCHRG MGMT 30/<: CPT

## 2023-04-24 RX ORDER — IRON SUCROSE 20 MG/ML
200 INJECTION, SOLUTION INTRAVENOUS ONCE
Refills: 0 | Status: COMPLETED | OUTPATIENT
Start: 2023-04-24 | End: 2023-04-24

## 2023-04-24 RX ADMIN — POLYETHYLENE GLYCOL 3350 17 GRAM(S): 17 POWDER, FOR SOLUTION ORAL at 06:01

## 2023-04-24 RX ADMIN — PANTOPRAZOLE SODIUM 40 MILLIGRAM(S): 20 TABLET, DELAYED RELEASE ORAL at 06:01

## 2023-04-24 RX ADMIN — Medication 40 MILLIGRAM(S): at 06:00

## 2023-04-24 RX ADMIN — Medication 1 MILLIGRAM(S): at 11:31

## 2023-04-24 RX ADMIN — Medication 25 MILLIGRAM(S): at 06:01

## 2023-04-24 RX ADMIN — Medication 300 UNIT(S): at 15:35

## 2023-04-24 NOTE — PHYSICAL THERAPY INITIAL EVALUATION ADULT - ADDITIONAL COMMENTS
Pt lives with son in a mother/daughter style home, 3 steps to enter with 1 HR, no steps inside. Pt has a tub shower, has a RW at home that she uses occasionally as needed for long distances when goes to [facility].

## 2023-04-24 NOTE — PHYSICAL THERAPY INITIAL EVALUATION ADULT - REHAB POTENTIAL, PT EVAL
Patient called, informed of ortho referral. Patient verbalized understanding and had no questions at this time.   good, to achieve stated therapy goals

## 2023-04-24 NOTE — CHART NOTE - NSCHARTNOTESELECT_GEN_ALL_CORE
Event Note PROCEDURE: CT angiography of the chest with contrast.



TECHNIQUE: Multiple contiguous axial images were obtained through

the chest after uneventful bolus administration of intravenous

contrast. 2D reconstructed CTA MIP acquisitions were also

performed.

 

INDICATION: Left-sided chest pain. Lightheaded. History of

tobacco use. Shortness of air.



COMPARISON: None.



FINDINGS:

There is no pulmonary artery filling defect to reflect pulmonary

embolism. Small amount of gas noted in the main pulmonary artery

non dependently, likely owing to IV access.  Heart size is

relatively normal without evidence for disproportionate right

heart strain. No pericardial effusion. Thoracic aorta is

unremarkable. A few mildly prominent scattered mediastinal lymph

nodes. No pathologically enlarged lymphadenopathy. Mildly

prominent right hilar lymph node at 16 x 10 mm. Calcification in

the right subcarinal region. Small amount of fluid within the

esophagus could be reflective of potential reflux.



There is mild to moderately advanced emphysematous change about

the lung parenchyma. Mild groundglass opacities are suggested,

however there is no focal lobar consolidation. A few small bullae

formation. No nadege honeycombing. There is suggestion of some

central peribronchial thickening, right slightly greater than

left. Asymmetric pleural-based nodularity in the superior segment

of the left lower lobe posteriorly at 10 x 4 mm. 



Diffuse hepatic steatosis. No definitive focal lesion. A few

shotty upper abdominal lymph nodes. There is 24 x 19 mm

lower-density left adrenal gland mass while incompletely

characterized favors probable adenoma.



The visualized osseous structures demonstrate no acute findings.



IMPRESSION:

1. No CTA evidence for pulmonary embolism.

2. Emphysematous change about the lung parenchyma. Small

pleural-based indeterminate nodule in the superior segment of the

left lower lobe.

3. Mildly prominent right hilar lymphadenopathy with bilateral

peribronchial thickening, suggesting likely nonspecific

bronchitis, could be chronic in nature. No nadege lobar

consolidation.

4. Given the overall findings and clinical history, particularly

of tobacco use, followup imaging is recommended. If patient is

clinically able, potential lung cancer screening protocol could

be utilized.



Dictated by: 



  Dictated on workstation # [id number]

## 2023-04-24 NOTE — CHART NOTE - NSCHARTNOTEFT_GEN_A_CORE
Pt seen and examined at bedside independently this morning. Pt felt well, Hgb stable. Pt declined any further work up. Aware of high risk of exsanguination, gib, readmission.     PHYSICAL EXAM:  GENERAL: NAD, speaks in full sentences, no signs of respiratory distress  HEAD:  Atraumatic, Normocephalic  EYES: EOMI, PERRLA, conjunctiva and sclera clear  NECK: Supple, No JVD  CHEST/LUNG: Clear to auscultation bilaterally; No wheeze; No crackles; No accessory muscles used  HEART: Regular rate and rhythm; + murmurs;   ABDOMEN: Soft, Nontender, Nondistended; Bowel sounds present; No guarding  EXTREMITIES:  2+ Peripheral Pulses, No cyanosis or edema  PSYCH: AAOx3  NEUROLOGY: non-focal  SKIN: No rashes or lesions

## 2023-04-24 NOTE — PROGRESS NOTE ADULT - SUBJECTIVE AND OBJECTIVE BOX
LATRICIA ARREAGA 77y Female  MRN#: [id number]   CODE STATUS:     Admission Date: 04-20-23  Hospital Day: 4d    Pt is currently admitted with the primary diagnosis of     SUBJECTIVE  Hospital Course  76 y/o woman w PMHx of HTN, HLD, severe AS, h/o DARRIN in setting of multiple gastric/duodenal AVMs s/p clipping, referred to hospital after visiting RN home blood draw noted Hb of ~4, found in ED to have Hb 5.2, initially admitted to tele, now s/p ?4uPRBC.       Admitting H&P  78 yo F with PMHx of HTN, DLD, CKD 4, Severe AS, Hx of Anemia due to iron deficiency in setting of multiple gastric and duodenal AVMs presents with low Hb. Pt has nurse come to her house to draw blood for cbc checks and was found to have Hb in the 4s and was told to come to ED. Pt denies any sx - denies dizziness/lightheadedness, dyspnea, chest discomfort, melena/hematochezia. Only states that she felt "wobbly" prior to presentation.  Pt lives with son and daughter and is entirely independent, cooks for herself and her children.  Pt reports that for the past 3 years she has been having recurrent anemia. 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  In the ED, VSS. Hb 5.2. CT Abd negative for acute pathology.      Overnight events      Subjective complaints        T(C): 36.1 (04-24-23 @ 05:06)  T(F): 96.9 (04-24-23 @ 05:06)  HR: 69 (04-24-23 @ 05:06)  BP: 115/60 (04-24-23 @ 05:06)  RR: 18 (04-24-23 @ 05:06)  SpO2: 99% (04-24-23 @ 05:06)        Present Today:   - Arora:  No [  ], Yes [  ] : Indication:   - Type of IV Access:       .. CVC/Piccline:  No [  ], Yes [  ] : Indication:       .. Midline: No [  ], Yes [  ] : Indication:                                              OBJECTIVE  PAST MEDICAL & SURGICAL HISTORY  HTN (hypertension)    Heart murmur    Eczema    Anemia  iron infusions and PRBC transfusions    Aortic stenosis    Chronic kidney disease (CKD)    2019 novel coronavirus disease (COVID-19)  4/2020- REHAB admission    H/O appendicitis    H/O cholecystitis  s/p cholecystectomy                                                ALLERGIES:  latex (Unknown)  EKG leads (Hives)  penicillins (Rash; Urticaria; Hives; Blisters)  aspirin (Rash; Other)                           HOME MEDICATIONS  Home Medications:  folic acid 1 mg oral tablet: 1 tab(s) orally once a day (21 Mar 2023 23:37)  furosemide 40 mg oral tablet: 1 tab(s) orally once a day (21 Apr 2023 00:01)  metoprolol tartrate 25 mg oral tablet: 1 tab(s) orally 2 times a day (21 Mar 2023 23:37)  MiraLax oral powder for reconstitution: 17 gram(s) orally once a day (21 Mar 2023 23:37)  Retacrit 20,000 units/mL injectable solution: [id number] unit(s) injectable once a week (21 Mar 2023 23:37)                           MEDICATIONS:  STANDING MEDICATIONS  folic acid 1 milliGRAM(s) Oral daily  furosemide    Tablet 40 milliGRAM(s) Oral daily  metoprolol tartrate 25 milliGRAM(s) Oral two times a day  pantoprazole  Injectable 40 milliGRAM(s) IV Push every 12 hours  polyethylene glycol 3350 17 Gram(s) Oral every 12 hours  senna 2 Tablet(s) Oral at bedtime    PRN MEDICATIONS  bisacodyl 5 milliGRAM(s) Oral every 12 hours PRN                                            ------------------------------------------------------------  T(C): 36.1 (04-24-23 @ 05:06), Max: 36.4 (04-23-23 @ 13:34)  T(F): 96.9 (04-24-23 @ 05:06), Max: 97.6 (04-23-23 @ 20:53)  HR: 69 (04-24-23 @ 05:06) (53 - 69)  BP: 115/60 (04-24-23 @ 05:06) (115/60 - 118/56)  RR: 18 (04-24-23 @ 05:06) (18 - 18)  SpO2: 99% (04-24-23 @ 05:06) (99% - 100%)      04-23-23 @ 07:01  -  04-24-23 @ 07:00  --------------------------------------------------------  IN: 940 mL / OUT: 2050 mL / NET: -1110 mL                                               LABS:                        8.5    3.72  )-----------( 133      ( 23 Apr 2023 23:11 )             26.7     04-23    143  |  110  |  78<HH>  ----------------------------<  90  4.8   |  22  |  3.0<H>    Ca    8.1<L>      23 Apr 2023 05:42                                RADIOLOGY:    CT Abd/Pelvis 4/20/23:   COMPARISON CT: CT abdomen and pelvis from September 6, 2022  OTHER STUDIES USED FOR CORRELATION: None.  FINDINGS:  LOWER CHEST: Partially visualized Port-A-Cath. Respiratory motion, limiting evaluation for small nodules. Mosaic attenuation of the visualized bilateral upper and lower lobes. Cardiomegaly.  HEPATOBILIARY: Post cholecystectomy.  SPLEEN: Unremarkable.  PANCREAS: Unremarkable.  ADRENAL GLANDS: Stable left adrenal gland nodule.  KIDNEYS: Bilateral renal cortical atrophy. No evidence of hydronephrosis. Renal cysts and other subcentimeterhypodensities, too small to further characterize.  ABDOMINOPELVIC NODES: Multiple scattered prominent mesenteric lymph nodes.  PELVIC ORGANS: Fibroid uterus.  PERITONEUM/MESENTERY/BOWEL: Sigmoid diverticulosis. No bowel obstruction, ascitesor intraperitoneal free air.  BONES/SOFT TISSUES: Diffuse osteopenia. Degenerative changes of the thoracolumbar spine. Small fat-containing umbilical hernia. No retroperitoneal hematoma.  VASCULAR: Aorta is normal in caliber.  IMPRESSION:  No CT evidence of acute intra-abdominal pathology.       LATRICIA ARREAGA 77y Female  MRN#: [id number]   CODE STATUS:     Admission Date: 04-20-23  Hospital Day: 4d    Pt is currently admitted with the primary diagnosis of anemia     SUBJECTIVE  Hospital Course  78 y/o woman w PMHx of HTN, HLD, severe AS, h/o DARRIN in setting of multiple gastric/duodenal AVMs s/p clipping, referred to hospital after visiting RN home blood draw noted Hb of ~4, found in ED to have Hb 5.2, initially admitted to tele, now s/p ?4uPRBC.       Admitting H&P  78 yo F with PMHx of HTN, DLD, CKD 4, Severe AS, Hx of Anemia due to iron deficiency in setting of multiple gastric and duodenal AVMs presents with low Hb. Pt has nurse come to her house to draw blood for cbc checks and was found to have Hb in the 4s and was told to come to ED. Pt denies any sx - denies dizziness/lightheadedness, dyspnea, chest discomfort, melena/hematochezia. Only states that she felt "wobbly" prior to presentation.  Pt lives with son and daughter and is entirely independent, cooks for herself and her children.  Pt reports that for the past 3 years she has been having recurrent anemia. 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  In the ED, VSS. Hb 5.2. CT Abd negative for acute pathology.      Overnight events  None     Subjective complaints  Denies - states feels better than when she was admitted, and would like to go home.       T(C): 36.1 (04-24-23 @ 05:06)  T(F): 96.9 (04-24-23 @ 05:06)  HR: 69 (04-24-23 @ 05:06)  BP: 115/60 (04-24-23 @ 05:06)  RR: 18 (04-24-23 @ 05:06)  SpO2: 99% (04-24-23 @ 05:06)      PHYSICAL EXAM:  GENERAL: NAD, lying in bed comfortably. Somewhat hard of hearing.   HEAD:  Atraumatic, Normocephalic  EYES: EOMI, sclera clear  ENT: Moist mucous membranes  NECK: Supple, trachea midline  CHEST/LUNG: Clear to auscultation anteriorly bilaterally; No rales, rhonchi, wheezing, or rubs. Unlabored respirations  HEART: Regular rate and rhythm; Loud 4/6 systolic murmur.   ABDOMEN: (+)BS; Soft, nontender, nondistended  EXTREMITIES:  2+ Radial Pulses, brisk capillary refill. No clubbing, cyanosis, or edema  NERVOUS SYSTEM:  Awake, alert, conversational, no noted facial droop.   SKIN: No rashes or lesions to b/l forearm, face.       Present Today:   - Arora:  No [ X ], Yes [  ] : Indication:   - Type of IV Access:       .. CVC/Piccline:  No [ X ], Yes [  ] : Indication:       .. Midline: No [ X ], Yes [  ] : Indication:                                              OBJECTIVE  PAST MEDICAL & SURGICAL HISTORY  HTN (hypertension)    Heart murmur    Eczema    Anemia  iron infusions and PRBC transfusions    Aortic stenosis    Chronic kidney disease (CKD)    2019 novel coronavirus disease (COVID-19)  4/2020- REHAB admission    H/O appendicitis    H/O cholecystitis  s/p cholecystectomy                                                ALLERGIES:  latex (Unknown)  EKG leads (Hives)  penicillins (Rash; Urticaria; Hives; Blisters)  aspirin (Rash; Other)                           HOME MEDICATIONS  Home Medications:  folic acid 1 mg oral tablet: 1 tab(s) orally once a day (21 Mar 2023 23:37)  furosemide 40 mg oral tablet: 1 tab(s) orally once a day (21 Apr 2023 00:01)  metoprolol tartrate 25 mg oral tablet: 1 tab(s) orally 2 times a day (21 Mar 2023 23:37)  MiraLax oral powder for reconstitution: 17 gram(s) orally once a day (21 Mar 2023 23:37)  Retacrit 20,000 units/mL injectable solution: 14046 unit(s) injectable once a week (21 Mar 2023 23:37)                           MEDICATIONS:  STANDING MEDICATIONS  folic acid 1 milliGRAM(s) Oral daily  furosemide    Tablet 40 milliGRAM(s) Oral daily  metoprolol tartrate 25 milliGRAM(s) Oral two times a day  pantoprazole  Injectable 40 milliGRAM(s) IV Push every 12 hours  polyethylene glycol 3350 17 Gram(s) Oral every 12 hours  senna 2 Tablet(s) Oral at bedtime    PRN MEDICATIONS  bisacodyl 5 milliGRAM(s) Oral every 12 hours PRN                                            ------------------------------------------------------------  T(C): 36.1 (04-24-23 @ 05:06), Max: 36.4 (04-23-23 @ 13:34)  T(F): 96.9 (04-24-23 @ 05:06), Max: 97.6 (04-23-23 @ 20:53)  HR: 69 (04-24-23 @ 05:06) (53 - 69)  BP: 115/60 (04-24-23 @ 05:06) (115/60 - 118/56)  RR: 18 (04-24-23 @ 05:06) (18 - 18)  SpO2: 99% (04-24-23 @ 05:06) (99% - 100%)      04-23-23 @ 07:01  -  04-24-23 @ 07:00  --------------------------------------------------------  IN: 940 mL / OUT: 2050 mL / NET: -1110 mL                                               LABS:                        8.5    3.72  )-----------( 133      ( 23 Apr 2023 23:11 )             26.7     04-23    143  |  110  |  78<HH>  ----------------------------<  90  4.8   |  22  |  3.0<H>    Ca    8.1<L>      23 Apr 2023 05:42                                RADIOLOGY:    CT Abd/Pelvis 4/20/23:   COMPARISON CT: CT abdomen and pelvis from September 6, 2022  OTHER STUDIES USED FOR CORRELATION: None.  FINDINGS:  LOWER CHEST: Partially visualized Port-A-Cath. Respiratory motion, limiting evaluation for small nodules. Mosaic attenuation of the visualized bilateral upper and lower lobes. Cardiomegaly.  HEPATOBILIARY: Post cholecystectomy.  SPLEEN: Unremarkable.  PANCREAS: Unremarkable.  ADRENAL GLANDS: Stable left adrenal gland nodule.  KIDNEYS: Bilateral renal cortical atrophy. No evidence of hydronephrosis. Renal cysts and other subcentimeterhypodensities, too small to further characterize.  ABDOMINOPELVIC NODES: Multiple scattered prominent mesenteric lymph nodes.  PELVIC ORGANS: Fibroid uterus.  PERITONEUM/MESENTERY/BOWEL: Sigmoid diverticulosis. No bowel obstruction, ascitesor intraperitoneal free air.  BONES/SOFT TISSUES: Diffuse osteopenia. Degenerative changes of the thoracolumbar spine. Small fat-containing umbilical hernia. No retroperitoneal hematoma.  VASCULAR: Aorta is normal in caliber.  IMPRESSION:  No CT evidence of acute intra-abdominal pathology.

## 2023-04-24 NOTE — PROGRESS NOTE ADULT - ASSESSMENT
76 y/o woman w PMHx of HTN, HLD, severe AS, h/o DARRIN in setting of multiple gastric/duodenal AVMs s/p clipping, referred to hospital after visiting RN home blood draw noted Hb of ~4, found in ED to have Hb 5.2, initially admitted to tele, now s/p ?4uPRBC.     Pt reports that for the past 3 years she has been having recurrent anemia. 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      #Acute on chronic iron deficiency and blood loss Anemia from AVMs  #Multiple prior gastric and duodenal AVM s/p clipping   - EGD/colonoscopy offerred. Pt unsure if she want repeat testing but family does  - pt and family to consider. 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 Gaston -follows weekly at [facility]     #CKD Stage 4 - Stable    #Severe AS  Echo 3/22/23: EF 62%, G2DD, severe AS   -outaptient cardio f/u for TAVR--has appt in 6/2023 with Dr. Hernandez    #HTN/HLD -continue current managment                                                                                ----------------------------------------------------  # DVT prophylaxis: None   # GI prophylaxis: Pantoprazole 40mg IV BID  # Diet: DASH TLC   # Activity: increase as tolerated   # Code status: DNR DNI   # Disposition: ?home                                                                            --------------------------------------------------------    # Handoff:   - GI f/u  - GOC, ?CMO   - Pt anticipate for dc today if no conclusion is made and hgb is stable.    76 y/o woman w PMHx of HTN, HLD, severe AS, h/o DARRIN in setting of multiple gastric/duodenal AVMs s/p clipping, referred to hospital after visiting RN home blood draw noted Hb of ~4, found in ED to have Hb 5.2, initially admitted to tele, now s/p ?4uPRBC.     Pt reports that for the past 3 years she has been having recurrent anemia. 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      #Acute on chronic iron deficiency and blood loss Anemia from AVMs  #Multiple prior gastric and duodenal AVM s/p clipping   - EGD/colonoscopy offerred. Pt unsure if she want repeat testing but family does  - pt and family to consider. 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 Gaston -follows weekly at [facility]     #CKD Stage 4 - Stable    #Severe AS  Echo 3/22/23: EF 62%, G2DD, severe AS   - OP cardio f/u for TAVR--has appt in 6/2023 with Dr. Hernandez    #HTN/HLD -continue current managment                                                                                ----------------------------------------------------  # DVT prophylaxis: None   # GI prophylaxis: Pantoprazole 40mg IV BID  # Diet: DASH TLC   # Activity: increase as tolerated   # Code status: DNR DNI   # Disposition: ?home                                                                            --------------------------------------------------------    # Handoff:   - GI f/u  - GOC, ?CMO   - Pt anticipate for dc today if hgb is stable.

## 2023-04-24 NOTE — PHYSICAL THERAPY INITIAL EVALUATION ADULT - NSACTIVITYREC_GEN_A_PT
Reviewed ther ex's for B LEs in sittng to perform t/o day: hip flexion, knee flex/ext, ankle pumps, 10 reps each.

## 2023-04-24 NOTE — PHYSICAL THERAPY INITIAL EVALUATION ADULT - GAIT TRAINING, PT EVAL
Improve amb to 150' with supervision using RW by d/c. Pt to negotiate 3 steps using 2 hands on HR with supervision by d/c.

## 2023-04-27 DIAGNOSIS — D50.9 IRON DEFICIENCY ANEMIA, UNSPECIFIED: ICD-10-CM

## 2023-04-27 DIAGNOSIS — K31.811 ANGIODYSPLASIA OF STOMACH AND DUODENUM WITH BLEEDING: ICD-10-CM

## 2023-04-27 DIAGNOSIS — Z88.0 ALLERGY STATUS TO PENICILLIN: ICD-10-CM

## 2023-04-27 DIAGNOSIS — D63.1 ANEMIA IN CHRONIC KIDNEY DISEASE: ICD-10-CM

## 2023-04-27 DIAGNOSIS — I35.0 NONRHEUMATIC AORTIC (VALVE) STENOSIS: ICD-10-CM

## 2023-04-27 DIAGNOSIS — Z91.040 LATEX ALLERGY STATUS: ICD-10-CM

## 2023-04-27 DIAGNOSIS — Z88.6 ALLERGY STATUS TO ANALGESIC AGENT: ICD-10-CM

## 2023-04-27 DIAGNOSIS — D62 ACUTE POSTHEMORRHAGIC ANEMIA: ICD-10-CM

## 2023-04-27 DIAGNOSIS — Z91.048 OTHER NONMEDICINAL SUBSTANCE ALLERGY STATUS: ICD-10-CM

## 2023-04-27 DIAGNOSIS — Z90.49 ACQUIRED ABSENCE OF OTHER SPECIFIED PARTS OF DIGESTIVE TRACT: ICD-10-CM

## 2023-04-27 DIAGNOSIS — E78.5 HYPERLIPIDEMIA, UNSPECIFIED: ICD-10-CM

## 2023-04-27 DIAGNOSIS — N18.4 CHRONIC KIDNEY DISEASE, STAGE 4 (SEVERE): ICD-10-CM

## 2023-04-27 DIAGNOSIS — K92.1 MELENA: ICD-10-CM

## 2023-04-27 DIAGNOSIS — D61.818 OTHER PANCYTOPENIA: ICD-10-CM

## 2023-04-27 DIAGNOSIS — H91.90 UNSPECIFIED HEARING LOSS, UNSPECIFIED EAR: ICD-10-CM

## 2023-04-27 DIAGNOSIS — I12.9 HYPERTENSIVE CHRONIC KIDNEY DISEASE WITH STAGE 1 THROUGH STAGE 4 CHRONIC KIDNEY DISEASE, OR UNSPECIFIED CHRONIC KIDNEY DISEASE: ICD-10-CM

## 2023-04-27 DIAGNOSIS — Z86.16 PERSONAL HISTORY OF COVID-19: ICD-10-CM

## 2023-04-27 DIAGNOSIS — Z28.310 UNVACCINATED FOR COVID-19: ICD-10-CM

## 2023-04-27 DIAGNOSIS — R10.13 EPIGASTRIC PAIN: ICD-10-CM

## 2023-04-28 ENCOUNTER — APPOINTMENT (OUTPATIENT)
Dept: INFUSION THERAPY | Facility: CLINIC | Age: 78
End: 2023-04-28

## 2023-04-28 ENCOUNTER — OUTPATIENT (OUTPATIENT)
Dept: OUTPATIENT SERVICES | Facility: HOSPITAL | Age: 78
LOS: 1 days | End: 2023-04-28
Payer: MEDICARE

## 2023-04-28 DIAGNOSIS — D64.9 ANEMIA, UNSPECIFIED: ICD-10-CM

## 2023-04-28 DIAGNOSIS — Z87.19 PERSONAL HISTORY OF OTHER DISEASES OF THE DIGESTIVE SYSTEM: Chronic | ICD-10-CM

## 2023-04-28 PROCEDURE — 96372 THER/PROPH/DIAG INJ SC/IM: CPT

## 2023-04-28 PROCEDURE — 96365 THER/PROPH/DIAG IV INF INIT: CPT

## 2023-04-28 PROCEDURE — 80053 COMPREHEN METABOLIC PANEL: CPT

## 2023-04-28 PROCEDURE — 36415 COLL VENOUS BLD VENIPUNCTURE: CPT

## 2023-04-28 RX ORDER — ERYTHROPOIETIN 10000 [IU]/ML
30000 INJECTION, SOLUTION INTRAVENOUS; SUBCUTANEOUS ONCE
Refills: 0 | Status: COMPLETED | OUTPATIENT
Start: 2023-04-28 | End: 2023-04-28

## 2023-04-28 RX ORDER — IRON SUCROSE 20 MG/ML
200 INJECTION, SOLUTION INTRAVENOUS ONCE
Refills: 0 | Status: COMPLETED | OUTPATIENT
Start: 2023-04-28 | End: 2023-04-28

## 2023-04-28 RX ADMIN — IRON SUCROSE 200 MILLIGRAM(S): 20 INJECTION, SOLUTION INTRAVENOUS at 14:10

## 2023-04-28 RX ADMIN — ERYTHROPOIETIN 30000 UNIT(S): 10000 INJECTION, SOLUTION INTRAVENOUS; SUBCUTANEOUS at 13:41

## 2023-04-28 RX ADMIN — IRON SUCROSE 220 MILLIGRAM(S): 20 INJECTION, SOLUTION INTRAVENOUS at 13:40

## 2023-04-29 LAB
ALBUMIN SERPL ELPH-MCNC: 3.3 G/DL
ALP BLD-CCNC: 74 U/L
ALT SERPL-CCNC: 6 U/L
ANION GAP SERPL CALC-SCNC: 11 MMOL/L
AST SERPL-CCNC: 11 U/L
BILIRUB SERPL-MCNC: 0.9 MG/DL
BUN SERPL-MCNC: 52 MG/DL
CALCIUM SERPL-MCNC: 8 MG/DL
CHLORIDE SERPL-SCNC: 113 MMOL/L
CO2 SERPL-SCNC: 21 MMOL/L
CREAT SERPL-MCNC: 2.4 MG/DL
EGFR: 20 ML/MIN/1.73M2
GLUCOSE SERPL-MCNC: 86 MG/DL
POTASSIUM SERPL-SCNC: 4.3 MMOL/L
PROT SERPL-MCNC: 5.5 G/DL
SODIUM SERPL-SCNC: 145 MMOL/L

## 2023-05-03 ENCOUNTER — APPOINTMENT (OUTPATIENT)
Dept: CARDIOLOGY | Facility: CLINIC | Age: 78
End: 2023-05-03
Payer: MEDICARE

## 2023-05-03 VITALS
WEIGHT: 228 LBS | HEART RATE: 72 BPM | SYSTOLIC BLOOD PRESSURE: 100 MMHG | BODY MASS INDEX: 36.64 KG/M2 | HEIGHT: 66 IN | DIASTOLIC BLOOD PRESSURE: 68 MMHG

## 2023-05-03 DIAGNOSIS — I25.10 ATHEROSCLEROTIC HEART DISEASE OF NATIVE CORONARY ARTERY W/OUT ANGINA PECTORIS: ICD-10-CM

## 2023-05-03 PROCEDURE — 99214 OFFICE O/P EST MOD 30 MIN: CPT | Mod: 25

## 2023-05-03 PROCEDURE — 93000 ELECTROCARDIOGRAM COMPLETE: CPT

## 2023-05-03 NOTE — HISTORY OF PRESENT ILLNESS
[FreeTextEntry1] : 76y/o F with Hx CAD, HTN,and angina pectoris on Metoprolol presents for cardiac follow up visit. Pt reports left sided chest tightness radiating to her upper abdomen. She denies SOB, dyspnea on exertion, palpitations, leg swelling, or syncope. Pt reports no other complaints. Pt is complaint with her medications.\par \par patient had an echo which showed moderate aortic stenosis\par gradient of 32mm hg \par lv function is normal\par \par no symptoms\par \par repeat echo is non diagnostic\par will follow medically\par possible cath sooner if symptoms get worse\par \par Patient has recurrent anemia and GI bleed\par Her last creatinine was 24 with a hemoglobin of 8\par She is to see Dr. Romero is in June for possible TAVR\par \par Recent echo reveals a significant increase in peak and mean aortic gradients\par

## 2023-05-03 NOTE — ASSESSMENT
[FreeTextEntry1] : aortic stenosis\par Recurrent anemia\par obesity\par atypical chest pain\par no chf

## 2023-05-04 ENCOUNTER — APPOINTMENT (OUTPATIENT)
Dept: OTOLARYNGOLOGY | Facility: CLINIC | Age: 78
End: 2023-05-04

## 2023-05-05 ENCOUNTER — OUTPATIENT (OUTPATIENT)
Dept: OUTPATIENT SERVICES | Facility: HOSPITAL | Age: 78
LOS: 1 days | End: 2023-05-05
Payer: MEDICARE

## 2023-05-05 ENCOUNTER — APPOINTMENT (OUTPATIENT)
Dept: INFUSION THERAPY | Facility: CLINIC | Age: 78
End: 2023-05-05

## 2023-05-05 DIAGNOSIS — D64.9 ANEMIA, UNSPECIFIED: ICD-10-CM

## 2023-05-05 DIAGNOSIS — Z87.19 PERSONAL HISTORY OF OTHER DISEASES OF THE DIGESTIVE SYSTEM: Chronic | ICD-10-CM

## 2023-05-05 LAB
IRON SATN MFR SERPL: 80 %
IRON SERPL-MCNC: 188 UG/DL
TIBC SERPL-MCNC: 234 UG/DL
UIBC SERPL-MCNC: 46 UG/DL

## 2023-05-05 PROCEDURE — 82728 ASSAY OF FERRITIN: CPT

## 2023-05-05 PROCEDURE — 86902 BLOOD TYPE ANTIGEN DONOR EA: CPT

## 2023-05-05 PROCEDURE — 86922 COMPATIBILITY TEST ANTIGLOB: CPT

## 2023-05-05 PROCEDURE — 36430 TRANSFUSION BLD/BLD COMPNT: CPT

## 2023-05-05 PROCEDURE — 96372 THER/PROPH/DIAG INJ SC/IM: CPT

## 2023-05-05 PROCEDURE — 83550 IRON BINDING TEST: CPT

## 2023-05-05 PROCEDURE — P9040: CPT

## 2023-05-05 PROCEDURE — 83540 ASSAY OF IRON: CPT

## 2023-05-05 PROCEDURE — 36415 COLL VENOUS BLD VENIPUNCTURE: CPT

## 2023-05-05 RX ORDER — ERYTHROPOIETIN 10000 [IU]/ML
30000 INJECTION, SOLUTION INTRAVENOUS; SUBCUTANEOUS ONCE
Refills: 0 | Status: COMPLETED | OUTPATIENT
Start: 2023-05-05 | End: 2023-05-05

## 2023-05-05 RX ADMIN — ERYTHROPOIETIN 30000 UNIT(S): 10000 INJECTION, SOLUTION INTRAVENOUS; SUBCUTANEOUS at 13:05

## 2023-05-05 NOTE — ED CDU PROVIDER INITIAL DAY NOTE - PHYSICAL EXAMINATION
VITAL SIGNS: I have reviewed nursing notes and confirm.  CONSTITUTIONAL: Well-developed; well-nourished; in no acute distress.  SKIN: Skin exam is warm and dry, no acute rash.  HEAD: Normocephalic; atraumatic.  EYES: Conjunctiva and sclera clear.  ENT: No nasal discharge; airway clear.   CARD: S1, S2 normal; no murmurs, gallops, or rubs. Regular rate and rhythm.  RESP: No wheezes, rales or rhonchi. Speaking in full sentences.   ABD: Normal bowel sounds; soft; non-distended; non-tender; No rebound or guarding.   EXT: Normal ROM. No clubbing, cyanosis or edema.  NEURO: Alert, oriented. Grossly unremarkable. No focal deficits. Fluconazole Pregnancy And Lactation Text: This medication is Pregnancy Category C and it isn't know if it is safe during pregnancy. It is also excreted in breast milk.

## 2023-05-07 LAB — FERRITIN SERPL-MCNC: 95 NG/ML

## 2023-05-09 NOTE — ASSESSMENT
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 \par peripheral edema . Worsening kidney function due to diuresis . \par \par decreased peripheral edema , diuretics held by PMD . \par \par CBC reviewed, Hgb : 7.7 , no active bleeding at this time ? \par \par Plan: Continue with venofer weekly \par          Retacrit 30,000 units  \par          weekly CBC , transfuse for Hgb < 7 or active bleeding . \par          F/u advanced GI , renal and cardiology .\par          Monitor ferritin , CBC , renal function . \par

## 2023-05-09 NOTE — HISTORY OF PRESENT ILLNESS
[de-identified] : bruce is a 74 year old white female with hypertension, chronic kidney disease, morbidly obese presents today with normocytic anemia. \par Her most recent CBC 04/01/2020 shows WBC -5.66, HB of 6.7, MCV -86.3, RDW - 17.3, platelet count- 196. Her Hb was normal until 08/2019. In October 2019 she noticed black tarry stools and was feeling tired. She went to ER and her Hb was 5.3. She was given 3 units of PRBC. Her iron studies at that showed ferritin of 8 and percent saturation of 5. She had EGD and colonoscopy which did not reveal any bleeding lesions. Following that event as per patient she was not given iron (?not sure why). She was readmitted to hospital in 01/2020 for SOB on exertion and her HB was noted to 6.0 again. She was given 2 units and her iron studies were consistent with DARRIN. B12 and folate were normal. Immunofixation showed weak IgG lambda migrating para protein. Free light chain ratio 2.1. Normal calcium. \par EGD and VCE was done. EGD revealed gastritis and VCE showed bleeding in small bowel. \par She was started on oral iron and she took for about three months. \par Her latest ferritin done in feb 2020 was 24 and percent saturation was 72% and her hb improved to 8.8. She also has push enteroscopy in 02/2020 and no bleeding lesions were found. Was recommended to have repeat colonoscopy and double balloon enteroscopy if she bleeds again.\par She went for blood work again on 04/01/2020 as she was feeling weak and having SOB and her hb dropped to 6.7. She was told by PMD to start on PROCRIT 10,000 units M-W-F. She was told her iron levels are good and she can stop iron. \par \par Today she feels very tired and her SOB is worse. She denies any melena or BRBPR in last few days. She does have anal fissure and hemorrhoids and bleeds when she is constipated. She denies hematuria or post menopausal bleeding. Denies weight loss. Does not take any NSAIDs or aspirin. She has not followed up with nephrology before.\par Family history is significant for breast cancer in her mother. She is a former smoker stopped 20 years ago.\par She is uptodate with mammogram and Pap smear. \par  [de-identified] : 09/17/2020 Patient returns for follow up , she feels better after venofer X 4 and on EPO 10,000 weekly , Hb is up to 10 . \par \par 10/15/2020 Patient returns for follow up 2 weeks after last dose of EPO , today's Hb is 10.7 . she offers no new complaints . \par \par 04/08/2021 Patient returns for follow up for anemia on EPO and iron replacement . Hb is 10.8 . she complains of mild left arm pain after she started to self check her BP . ahe meds were recently adjusted by her PMD . \par \par 08/31/2021 patient returns for follow up , she offers no new complaints , her Hgb is slowly trending down after last transfusion and venofer X1 for ferritin : 35 . \par \par 11/18/2021 patient returns for follow up complaining of weakness and dyspnea on minimal exertion , still with lower extremity edema R > L . Hgb is 7.1 2 weeks after transfusion and despite venofer and EPO . She denies melena or hematochezia . Of note she had suspected small bowel bleeding on enteroscopy or capsule endoscopy and was intolerant to push enteroscopy ( hypotension ? ) \par 2/14/22- Patient is here for follow up visit for management of DARRIN. Hgb is 6.7  3 days only after last transfusion , she reports several episodes of bleeding presumably from hemorrhoids and is using topical medications , Ferritin levels remain low despite venofer . She complains of weakness, bilateral leg edema . no chest pain or SOB . \par \par 12/2/21: patient returns for follow up for DARRIN secondary  obscure GI bleeding  and CKD. \par She is feeling better today. We will continue  Venofer infusion weekly X 3 and Procrit.\par Close monitor CBC on weekly basis with possible blood transfusion if required. \par \par 5/19/2022 Patient returns for follow up , she lost her  last week to lung cancer . He Hgb is still 7.2  two weeks after transfusion and despite weekly venofer . Ferritin is trending down . She is noted with weight loss and complains of abdominal cramps and pain radiating from epigastric area and RUQ , radiating to the back .\par Of note CT scan from 1/2022 showed prominent adenopathy involving mesenteric and retroperitoneal lymph nodes. \par \par \par 8/8/2022 Patient returns for chronic iron deficiency anemia , she is requiring transfusion every 2 weeks . today's Hgb is 6.9 however she denies hematochezia , increased weakness , dizziness , chest pain or palpitations . She continues with mild RUQ pain radiating to the back. \par \par \par 4/13/2023 patient returns for follow after recent admission for melena , she underwent push enteroscopy 	A few small bleeding diffuse angioectasias were seen in\par the antrum. The lesions were distributed in a watermelon-stomach pattern. Hemostasis was performed successfully at the antrum. 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 APC applied to bleeding sites with excellent hemostasis.   Multiple small AVMs were noted in duodenal bulb and\par second part of duodenum. , No blood was noted in jejunum. 2 small non-bleeding\par AVMs were noted in proximal jejunum. \par \par She feels well today , no melena , no weakness or SOB . Edema improved on lasix 40 mg daily . \par  \par \par

## 2023-05-09 NOTE — PHYSICAL EXAM
[Obese] : obese [Normal] : clear to auscultation bilaterally, no dullness, no wheezing [de-identified] : pale in no acute distress.  [de-identified] : grade 3/6 systolic murmur  [de-identified] : lower extremity edema ( R > L ) 1 to 2 plus .  [de-identified] : mild diffuse tenderness , no rebound

## 2023-05-11 ENCOUNTER — NON-APPOINTMENT (OUTPATIENT)
Age: 78
End: 2023-05-11

## 2023-05-12 ENCOUNTER — OUTPATIENT (OUTPATIENT)
Dept: OUTPATIENT SERVICES | Facility: HOSPITAL | Age: 78
LOS: 1 days | End: 2023-05-12

## 2023-05-12 ENCOUNTER — OUTPATIENT (OUTPATIENT)
Dept: OUTPATIENT SERVICES | Facility: HOSPITAL | Age: 78
LOS: 1 days | End: 2023-05-12
Payer: MEDICARE

## 2023-05-12 ENCOUNTER — APPOINTMENT (OUTPATIENT)
Dept: INFUSION THERAPY | Facility: CLINIC | Age: 78
End: 2023-05-12

## 2023-05-12 ENCOUNTER — APPOINTMENT (OUTPATIENT)
Dept: HEMATOLOGY ONCOLOGY | Facility: CLINIC | Age: 78
End: 2023-05-12
Payer: MEDICARE

## 2023-05-12 VITALS
HEART RATE: 95 BPM | WEIGHT: 228 LBS | RESPIRATION RATE: 16 BRPM | SYSTOLIC BLOOD PRESSURE: 108 MMHG | HEIGHT: 66 IN | BODY MASS INDEX: 36.64 KG/M2 | DIASTOLIC BLOOD PRESSURE: 49 MMHG | TEMPERATURE: 98 F

## 2023-05-12 DIAGNOSIS — D64.9 ANEMIA, UNSPECIFIED: ICD-10-CM

## 2023-05-12 DIAGNOSIS — Z87.19 PERSONAL HISTORY OF OTHER DISEASES OF THE DIGESTIVE SYSTEM: Chronic | ICD-10-CM

## 2023-05-12 PROCEDURE — 99213 OFFICE O/P EST LOW 20 MIN: CPT

## 2023-05-12 RX ORDER — ERYTHROPOIETIN 10000 [IU]/ML
30000 INJECTION, SOLUTION INTRAVENOUS; SUBCUTANEOUS ONCE
Refills: 0 | Status: COMPLETED | OUTPATIENT
Start: 2023-05-12 | End: 2023-05-12

## 2023-05-12 RX ORDER — FUROSEMIDE 40 MG
20 TABLET ORAL ONCE
Refills: 0 | Status: COMPLETED | OUTPATIENT
Start: 2023-05-12 | End: 2023-05-12

## 2023-05-12 RX ORDER — IRON SUCROSE 20 MG/ML
200 INJECTION, SOLUTION INTRAVENOUS ONCE
Refills: 0 | Status: COMPLETED | OUTPATIENT
Start: 2023-05-12 | End: 2023-05-12

## 2023-05-12 RX ADMIN — IRON SUCROSE 110 MILLIGRAM(S): 20 INJECTION, SOLUTION INTRAVENOUS at 13:24

## 2023-05-12 RX ADMIN — Medication 20 MILLIGRAM(S): at 13:24

## 2023-05-12 RX ADMIN — ERYTHROPOIETIN 30000 UNIT(S): 10000 INJECTION, SOLUTION INTRAVENOUS; SUBCUTANEOUS at 13:24

## 2023-05-15 ENCOUNTER — NON-APPOINTMENT (OUTPATIENT)
Age: 78
End: 2023-05-15

## 2023-05-15 NOTE — HISTORY OF PRESENT ILLNESS
[de-identified] : bruce is a 74 year old white female with hypertension, chronic kidney disease, morbidly obese presents today with normocytic anemia. \par Her most recent CBC 04/01/2020 shows WBC -5.66, HB of 6.7, MCV -86.3, RDW - 17.3, platelet count- 196. Her Hb was normal until 08/2019. In October 2019 she noticed black tarry stools and was feeling tired. She went to ER and her Hb was 5.3. She was given 3 units of PRBC. Her iron studies at that showed ferritin of 8 and percent saturation of 5. She had EGD and colonoscopy which did not reveal any bleeding lesions. Following that event as per patient she was not given iron (?not sure why). She was readmitted to hospital in 01/2020 for SOB on exertion and her HB was noted to 6.0 again. She was given 2 units and her iron studies were consistent with DARRIN. B12 and folate were normal. Immunofixation showed weak IgG lambda migrating para protein. Free light chain ratio 2.1. Normal calcium. \par EGD and VCE was done. EGD revealed gastritis and VCE showed bleeding in small bowel. \par She was started on oral iron and she took for about three months. \par Her latest ferritin done in feb 2020 was 24 and percent saturation was 72% and her hb improved to 8.8. She also has push enteroscopy in 02/2020 and no bleeding lesions were found. Was recommended to have repeat colonoscopy and double balloon enteroscopy if she bleeds again.\par She went for blood work again on 04/01/2020 as she was feeling weak and having SOB and her hb dropped to 6.7. She was told by PMD to start on PROCRIT 10,000 units M-W-F. She was told her iron levels are good and she can stop iron. \par \par Today she feels very tired and her SOB is worse. She denies any melena or BRBPR in last few days. She does have anal fissure and hemorrhoids and bleeds when she is constipated. She denies hematuria or post menopausal bleeding. Denies weight loss. Does not take any NSAIDs or aspirin. She has not followed up with nephrology before.\par Family history is significant for breast cancer in her mother. She is a former smoker stopped 20 years ago.\par She is uptodate with mammogram and Pap smear. \par  [de-identified] : 09/17/2020 Patient returns for follow up , she feels better after venofer X 4 and on EPO 10,000 weekly , Hb is up to 10 . \par \par 10/15/2020 Patient returns for follow up 2 weeks after last dose of EPO , today's Hb is 10.7 . she offers no new complaints . \par \par 04/08/2021 Patient returns for follow up for anemia on EPO and iron replacement . Hb is 10.8 . she complains of mild left arm pain after she started to self check her BP . ahe meds were recently adjusted by her PMD . \par \par 08/31/2021 patient returns for follow up , she offers no new complaints , her Hgb is slowly trending down after last transfusion and venofer X1 for ferritin : 35 . \par \par 11/18/2021 patient returns for follow up complaining of weakness and dyspnea on minimal exertion , still with lower extremity edema R > L . Hgb is 7.1 2 weeks after transfusion and despite venofer and EPO . She denies melena or hematochezia . Of note she had suspected small bowel bleeding on enteroscopy or capsule endoscopy and was intolerant to push enteroscopy ( hypotension ? ) \par 2/14/22- Patient is here for follow up visit for management of DARRIN. Hgb is 6.7  3 days only after last transfusion , she reports several episodes of bleeding presumably from hemorrhoids and is using topical medications , Ferritin levels remain low despite venofer . She complains of weakness, bilateral leg edema . no chest pain or SOB . \par \par 12/2/21: patient returns for follow up for DARRIN secondary  obscure GI bleeding  and CKD. \par She is feeling better today. We will continue  Venofer infusion weekly X 3 and Procrit.\par Close monitor CBC on weekly basis with possible blood transfusion if required. \par \par 5/19/2022 Patient returns for follow up , she lost her  last week to lung cancer . He Hgb is still 7.2  two weeks after transfusion and despite weekly venofer . Ferritin is trending down . She is noted with weight loss and complains of abdominal cramps and pain radiating from epigastric area and RUQ , radiating to the back .\par Of note CT scan from 1/2022 showed prominent adenopathy involving mesenteric and retroperitoneal lymph nodes. \par \par \par 8/8/2022 Patient returns for chronic iron deficiency anemia , she is requiring transfusion every 2 weeks . today's Hgb is 6.9 however she denies hematochezia , increased weakness , dizziness , chest pain or palpitations . She continues with mild RUQ pain radiating to the back. \par \par \par 4/13/2023 patient returns for follow after recent admission for melena , she underwent push enteroscopy 	A few small bleeding diffuse angioectasias were seen in\par the antrum. The lesions were distributed in a watermelon-stomach pattern. Hemostasis was performed successfully at the antrum. 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 APC applied to bleeding sites with excellent hemostasis.   Multiple small AVMs were noted in duodenal bulb and\par second part of duodenum. , No blood was noted in jejunum. 2 small non-bleeding\par AVMs were noted in proximal jejunum. \par \par She feels well today , no melena , no weakness or SOB . Edema improved on lasix 40 mg daily . \par  \par \par 5/12/2023 Patient returns for follow up one week after transfusion of 2 units with recurrence of severe weakness, exertional dyspnea . Hgb is down to 5.5 ,She insists no change in stool color .\par ferritin is still < 100 despite weekly venofer . She is also on EPO for severe CKD , she was seen recently by renal and recommended blood and urine tests . \par \par \par

## 2023-05-15 NOTE — ASSESSMENT
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 \par peripheral edema . Worsening kidney function .\par \par CBC reviewed, Hgb : 5.5  s/p transfusion X 2 one week ago . \par \par Plan: Continue with venofer weekly , folic acid . \par          Retacrit 30,000 weekly \par          Trial of Tranexamic acid 1300 mg daily for one week ( adjusted to renal function ) . \par          apprised of side effects including headaches ,chest pain , nausea , rash , dizziness, visual symptoms , \par          rare incidence of thrombosis . \par          F/u advanced GI , renal and cardiology , consider TAVR ?\par           \par

## 2023-05-15 NOTE — PHYSICAL EXAM
[Obese] : obese [Normal] : clear to auscultation bilaterally, no dullness, no wheezing [de-identified] : pale in no acute distress.  [de-identified] : grade 3/6 systolic murmur  [de-identified] : lower extremity edema ( R > L ) 1 to 2 plus .  [de-identified] : mild diffuse tenderness , no rebound

## 2023-05-18 ENCOUNTER — NON-APPOINTMENT (OUTPATIENT)
Age: 78
End: 2023-05-18

## 2023-05-18 ENCOUNTER — APPOINTMENT (OUTPATIENT)
Dept: HEMATOLOGY ONCOLOGY | Facility: CLINIC | Age: 78
End: 2023-05-18

## 2023-05-18 ENCOUNTER — INPATIENT (INPATIENT)
Facility: HOSPITAL | Age: 78
LOS: 1 days | Discharge: HOME | DRG: 812 | End: 2023-05-20
Attending: INTERNAL MEDICINE | Admitting: INTERNAL MEDICINE
Payer: MEDICARE

## 2023-05-18 VITALS
DIASTOLIC BLOOD PRESSURE: 60 MMHG | WEIGHT: 231.93 LBS | HEIGHT: 66 IN | SYSTOLIC BLOOD PRESSURE: 136 MMHG | HEART RATE: 74 BPM | TEMPERATURE: 97 F | RESPIRATION RATE: 18 BRPM | OXYGEN SATURATION: 100 %

## 2023-05-18 DIAGNOSIS — Z87.19 PERSONAL HISTORY OF OTHER DISEASES OF THE DIGESTIVE SYSTEM: Chronic | ICD-10-CM

## 2023-05-18 DIAGNOSIS — D50.9 IRON DEFICIENCY ANEMIA, UNSPECIFIED: ICD-10-CM

## 2023-05-18 LAB
ALBUMIN SERPL ELPH-MCNC: 2.9 G/DL — LOW (ref 3.5–5.2)
ALP SERPL-CCNC: 64 U/L — SIGNIFICANT CHANGE UP (ref 30–115)
ALT FLD-CCNC: <5 U/L — SIGNIFICANT CHANGE UP (ref 0–41)
ANION GAP SERPL CALC-SCNC: 10 MMOL/L — SIGNIFICANT CHANGE UP (ref 7–14)
ANISOCYTOSIS BLD QL: SLIGHT — SIGNIFICANT CHANGE UP
APTT BLD: 33.1 SEC — SIGNIFICANT CHANGE UP (ref 27–39.2)
AST SERPL-CCNC: 11 U/L — SIGNIFICANT CHANGE UP (ref 0–41)
BASOPHILS # BLD AUTO: 0.01 K/UL — SIGNIFICANT CHANGE UP (ref 0–0.2)
BASOPHILS NFR BLD AUTO: 0.4 % — SIGNIFICANT CHANGE UP (ref 0–1)
BILIRUB SERPL-MCNC: 0.4 MG/DL — SIGNIFICANT CHANGE UP (ref 0.2–1.2)
BLD GP AB SCN SERPL QL: SIGNIFICANT CHANGE UP
BUN SERPL-MCNC: 70 MG/DL — CRITICAL HIGH (ref 10–20)
CALCIUM SERPL-MCNC: 7.7 MG/DL — LOW (ref 8.4–10.5)
CHLORIDE SERPL-SCNC: 111 MMOL/L — HIGH (ref 98–110)
CO2 SERPL-SCNC: 22 MMOL/L — SIGNIFICANT CHANGE UP (ref 17–32)
CREAT SERPL-MCNC: 2.6 MG/DL — HIGH (ref 0.7–1.5)
DACRYOCYTES BLD QL SMEAR: SLIGHT — SIGNIFICANT CHANGE UP
EGFR: 18 ML/MIN/1.73M2 — LOW
EOSINOPHIL # BLD AUTO: 0.15 K/UL — SIGNIFICANT CHANGE UP (ref 0–0.7)
EOSINOPHIL NFR BLD AUTO: 5.9 % — SIGNIFICANT CHANGE UP (ref 0–8)
GLUCOSE SERPL-MCNC: 97 MG/DL — SIGNIFICANT CHANGE UP (ref 70–99)
HCT VFR BLD CALC: 14.8 % — LOW (ref 37–47)
HCT VFR BLD CALC: 15.6 % — LOW (ref 37–47)
HGB BLD-MCNC: 4.5 G/DL — CRITICAL LOW (ref 12–16)
HGB BLD-MCNC: 4.9 G/DL — CRITICAL LOW (ref 12–16)
IMM GRANULOCYTES NFR BLD AUTO: 0.4 % — HIGH (ref 0.1–0.3)
INR BLD: 0.97 RATIO — SIGNIFICANT CHANGE UP (ref 0.65–1.3)
LYMPHOCYTES # BLD AUTO: 0.38 K/UL — LOW (ref 1.2–3.4)
LYMPHOCYTES # BLD AUTO: 15 % — LOW (ref 20.5–51.1)
MANUAL SMEAR VERIFICATION: SIGNIFICANT CHANGE UP
MCHC RBC-ENTMCNC: 29.4 PG — SIGNIFICANT CHANGE UP (ref 27–31)
MCHC RBC-ENTMCNC: 29.9 PG — SIGNIFICANT CHANGE UP (ref 27–31)
MCHC RBC-ENTMCNC: 30.4 G/DL — LOW (ref 32–37)
MCHC RBC-ENTMCNC: 31.4 G/DL — LOW (ref 32–37)
MCV RBC AUTO: 95.1 FL — SIGNIFICANT CHANGE UP (ref 81–99)
MCV RBC AUTO: 96.7 FL — SIGNIFICANT CHANGE UP (ref 81–99)
MONOCYTES # BLD AUTO: 0.29 K/UL — SIGNIFICANT CHANGE UP (ref 0.1–0.6)
MONOCYTES NFR BLD AUTO: 11.4 % — HIGH (ref 1.7–9.3)
NEUTROPHILS # BLD AUTO: 1.7 K/UL — SIGNIFICANT CHANGE UP (ref 1.4–6.5)
NEUTROPHILS NFR BLD AUTO: 66.9 % — SIGNIFICANT CHANGE UP (ref 42.2–75.2)
NRBC # BLD: 0 /100 WBCS — SIGNIFICANT CHANGE UP (ref 0–0)
NRBC # BLD: 0 /100 WBCS — SIGNIFICANT CHANGE UP (ref 0–0)
PLAT MORPH BLD: NORMAL — SIGNIFICANT CHANGE UP
PLATELET # BLD AUTO: 87 K/UL — LOW (ref 130–400)
PLATELET # BLD AUTO: 97 K/UL — LOW (ref 130–400)
PMV BLD: 10 FL — SIGNIFICANT CHANGE UP (ref 7.4–10.4)
PMV BLD: 10.4 FL — SIGNIFICANT CHANGE UP (ref 7.4–10.4)
POLYCHROMASIA BLD QL SMEAR: SLIGHT — SIGNIFICANT CHANGE UP
POTASSIUM SERPL-MCNC: 4.2 MMOL/L — SIGNIFICANT CHANGE UP (ref 3.5–5)
POTASSIUM SERPL-SCNC: 4.2 MMOL/L — SIGNIFICANT CHANGE UP (ref 3.5–5)
PROT SERPL-MCNC: 4.8 G/DL — LOW (ref 6–8)
PROTHROM AB SERPL-ACNC: 11 SEC — SIGNIFICANT CHANGE UP (ref 9.95–12.87)
RBC # BLD: 1.53 M/UL — LOW (ref 4.2–5.4)
RBC # BLD: 1.64 M/UL — LOW (ref 4.2–5.4)
RBC # FLD: 18.6 % — HIGH (ref 11.5–14.5)
RBC # FLD: 18.7 % — HIGH (ref 11.5–14.5)
RBC BLD AUTO: ABNORMAL
SODIUM SERPL-SCNC: 143 MMOL/L — SIGNIFICANT CHANGE UP (ref 135–146)
TROPONIN T SERPL-MCNC: <0.01 NG/ML — SIGNIFICANT CHANGE UP
WBC # BLD: 2.15 K/UL — LOW (ref 4.8–10.8)
WBC # BLD: 2.54 K/UL — LOW (ref 4.8–10.8)
WBC # FLD AUTO: 2.15 K/UL — LOW (ref 4.8–10.8)
WBC # FLD AUTO: 2.54 K/UL — LOW (ref 4.8–10.8)

## 2023-05-18 PROCEDURE — P9040: CPT

## 2023-05-18 PROCEDURE — 82746 ASSAY OF FOLIC ACID SERUM: CPT

## 2023-05-18 PROCEDURE — 85610 PROTHROMBIN TIME: CPT

## 2023-05-18 PROCEDURE — 99285 EMERGENCY DEPT VISIT HI MDM: CPT

## 2023-05-18 PROCEDURE — 80048 BASIC METABOLIC PNL TOTAL CA: CPT

## 2023-05-18 PROCEDURE — 36415 COLL VENOUS BLD VENIPUNCTURE: CPT

## 2023-05-18 PROCEDURE — 82607 VITAMIN B-12: CPT

## 2023-05-18 PROCEDURE — 85730 THROMBOPLASTIN TIME PARTIAL: CPT

## 2023-05-18 PROCEDURE — C9113: CPT

## 2023-05-18 PROCEDURE — 83540 ASSAY OF IRON: CPT

## 2023-05-18 PROCEDURE — 86922 COMPATIBILITY TEST ANTIGLOB: CPT

## 2023-05-18 PROCEDURE — 85025 COMPLETE CBC W/AUTO DIFF WBC: CPT

## 2023-05-18 PROCEDURE — 36430 TRANSFUSION BLD/BLD COMPNT: CPT

## 2023-05-18 PROCEDURE — 99223 1ST HOSP IP/OBS HIGH 75: CPT

## 2023-05-18 PROCEDURE — 85027 COMPLETE CBC AUTOMATED: CPT

## 2023-05-18 RX ORDER — FOLIC ACID 0.8 MG
1 TABLET ORAL DAILY
Refills: 0 | Status: DISCONTINUED | OUTPATIENT
Start: 2023-05-18 | End: 2023-05-20

## 2023-05-18 RX ORDER — ONDANSETRON 8 MG/1
4 TABLET, FILM COATED ORAL EVERY 8 HOURS
Refills: 0 | Status: DISCONTINUED | OUTPATIENT
Start: 2023-05-18 | End: 2023-05-20

## 2023-05-18 RX ORDER — ACETAMINOPHEN 500 MG
650 TABLET ORAL EVERY 6 HOURS
Refills: 0 | Status: DISCONTINUED | OUTPATIENT
Start: 2023-05-18 | End: 2023-05-20

## 2023-05-18 RX ORDER — METOPROLOL TARTRATE 50 MG
25 TABLET ORAL
Refills: 0 | Status: DISCONTINUED | OUTPATIENT
Start: 2023-05-18 | End: 2023-05-20

## 2023-05-18 RX ORDER — LANOLIN ALCOHOL/MO/W.PET/CERES
3 CREAM (GRAM) TOPICAL AT BEDTIME
Refills: 0 | Status: DISCONTINUED | OUTPATIENT
Start: 2023-05-18 | End: 2023-05-20

## 2023-05-18 RX ORDER — PANTOPRAZOLE SODIUM 20 MG/1
40 TABLET, DELAYED RELEASE ORAL EVERY 12 HOURS
Refills: 0 | Status: DISCONTINUED | OUTPATIENT
Start: 2023-05-18 | End: 2023-05-20

## 2023-05-18 RX ORDER — PANTOPRAZOLE SODIUM 20 MG/1
40 TABLET, DELAYED RELEASE ORAL ONCE
Refills: 0 | Status: COMPLETED | OUTPATIENT
Start: 2023-05-18 | End: 2023-05-18

## 2023-05-18 RX ORDER — POLYETHYLENE GLYCOL 3350 17 G/17G
17 POWDER, FOR SOLUTION ORAL
Qty: 0 | Refills: 0 | DISCHARGE

## 2023-05-18 RX ORDER — ERYTHROPOIETIN 10000 [IU]/ML
30000 INJECTION, SOLUTION INTRAVENOUS; SUBCUTANEOUS
Qty: 0 | Refills: 0 | DISCHARGE

## 2023-05-18 RX ORDER — FUROSEMIDE 40 MG
40 TABLET ORAL ONCE
Refills: 0 | Status: COMPLETED | OUTPATIENT
Start: 2023-05-18 | End: 2023-05-18

## 2023-05-18 RX ORDER — ACETAMINOPHEN 500 MG
975 TABLET ORAL ONCE
Refills: 0 | Status: COMPLETED | OUTPATIENT
Start: 2023-05-18 | End: 2023-05-18

## 2023-05-18 RX ADMIN — PANTOPRAZOLE SODIUM 40 MILLIGRAM(S): 20 TABLET, DELAYED RELEASE ORAL at 16:15

## 2023-05-18 RX ADMIN — Medication 975 MILLIGRAM(S): at 15:57

## 2023-05-18 RX ADMIN — Medication 40 MILLIGRAM(S): at 17:42

## 2023-05-18 NOTE — ED PROVIDER NOTE - PHYSICAL EXAMINATION
VITAL SIGNS: I have reviewed nursing notes and confirm.  CONSTITUTIONAL: non-toxic, well appearing  SKIN: no rash, no petechiae.  EYES: PERRL, + pale conjunctiva, anicteric  ENT: tongue midline, no exudates, MMM  NECK: Supple; no meningismus  CARD: + systolic murmur, RRR, equal radial pulses bilaterally 2+  RESP: CTAB, no respiratory distress  ABD: Soft, non-tender, + obese abd   RECTAL: no melena, no gross blood, + external hemorrhoid non-thrombosed   EXT: No edema. No calves tenderness  NEURO: Alert, oriented. no focal deficits   PSYCH: Cooperative, appropriate.

## 2023-05-18 NOTE — H&P ADULT - NSHPLABSRESULTS_GEN_ALL_CORE
4.9    2.54  )-----------( 97       ( 18 May 2023 15:17 )             15.6       05-18    143  |  111<H>  |  70<HH>  ----------------------------<  97  4.2   |  22  |  2.6<H>    Ca    7.7<L>      18 May 2023 15:17    TPro  4.8<L>  /  Alb  2.9<L>  /  TBili  0.4  /  DBili  x   /  AST  11  /  ALT  <5  /  AlkPhos  64  05-18            PT/INR - ( 18 May 2023 15:17 )   PT: 11.00 sec;   INR: 0.97 ratio         PTT - ( 18 May 2023 15:17 )  PTT:33.1 sec    Lactate Trend      CARDIAC MARKERS ( 18 May 2023 15:59 )  x     / <0.01 ng/mL / x     / x     / x

## 2023-05-18 NOTE — ED PROVIDER NOTE - DIFFERENTIAL DIAGNOSIS
c/f symptomatic anemia, assess for henrry, lyte abn, no infectious complaints Differential Diagnosis

## 2023-05-18 NOTE — ED PROVIDER NOTE - NS_BEDUNITTYPES_ED_ALL_ED
MED/SURG Island Pedicle Flap Text: The defect edges were debeveled with a #15 scalpel blade.  Given the location of the defect, shape of the defect and the proximity to free margins an island pedicle advancement flap was deemed most appropriate.  Using a sterile surgical marker, an appropriate advancement flap was drawn incorporating the defect, outlining the appropriate donor tissue and placing the expected incisions within the relaxed skin tension lines where possible.    The area thus outlined was incised deep to adipose tissue with a #15 scalpel blade.  The skin margins were undermined to an appropriate distance in all directions around the primary defect and laterally outward around the island pedicle utilizing iris scissors.  There was minimal undermining beneath the pedicle flap.

## 2023-05-18 NOTE — ED PROVIDER NOTE - ATTENDING CONTRIBUTION TO CARE
77-year-old female w PMHx of HTN, HLD, CKD4, Severe AS, mild PHTN, multiple episodes of GI bleeding with AVM, DARRIN (follows with Dr Gaston) w/ frequent iron and PRBC transfusions presents to ED to low hb of 4.9  pt presents for feeling generalized weakness. pt states she frequently req transfusions and last transfusion ~2 wks ago.  no black/bloody stools.  pt had labs today and was told to [facility] ED for hgb 4.9.  no cp, syncope, trauma.    vss  gen- NAD, aaox3  card-rrr  lungs-ctab, no wheezing or rhonchi  abd-sntnd, no guarding or rebound, rectal exam negative - browns stool, no blood   neuro- full str/sensation, cn ii-xii grossly intact, normal coordination

## 2023-05-18 NOTE — ED PROVIDER NOTE - OBJECTIVE STATEMENT
77-year-old female w PMHx of HTN, HLD, CKD4, Severe AS, mild PHTN, multiple episodes of GI bleeding with AVM, DARRIN (follows with Dr Gaston) w/ frequent iron and PRBC transfusions presents to ED to low hb of 4.9 based on blood work performed today. States she has been feeling generalized weakness fatigue and headache for the past several days.  Patient last got a blood transfusion about 2 weeks ago.  Does report some dyspnea as well. Denies fevers chills chest pain palpitations cough nausea vomiting diarrhea abdominal pain dysuria hematochezia or melena.

## 2023-05-18 NOTE — H&P ADULT - ASSESSMENT
Patient is a 78yo F with a PMHx of HTN, CKD stage 4, Severe AS, Anemia due to iron deficiency in setting of multiple gastric and duodenal AVMs presented to ED with c/o low Hb. Pt has nurse come to her house for weekly blood draw for cbc checks and was found to have Hb in the 4.9 and was told to come to ED. Pt c/o faigue. Pt denies any  dizziness, lightheadedness, dyspnea, chest discomfort, melena/hematochezia. Only states that she felt "wobbly" prior t  Pt lives with son and daughter and is entirely independent, cooks for herself and her children.  Pt reports that for the past 3 years she has been having recurrent anemia. 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  In ED, VSS,  2 unit prbc ordered for pt and IV pantoprazole push.    #Chronic GIB  -admit  -consult GI  -trend cbc  -pt has port accessed in ED  -change pantoprazole to IV  -avoid nsaids  -clear liquid diet for now pending GI eval    #anemia secondary to chronic blood loss  -pt follows with hematology Dr Gaston, receives iron infusions  -2 unit PRBC ordered by ED  -trend cbc  -transfuse prn    #CKD stage 4  -creatinine at baseline 2.6  -trend level    #severe AS  -pt reports recommended for valve replacement when cleared by GI    #HTN  -low sodium diet  -monitor bp  -cont meds from home lasix and metoprolol    #DVT prophylaxis  -SCD only  -no anticoagulants as high risk for bleedin   Patient is a 78yo F with a PMHx of HTN, CKD stage 4, Severe AS, Anemia due to iron deficiency in setting of multiple gastric and duodenal AVMs presented to ED with c/o low Hb. Pt has nurse come to her house for weekly blood draw for cbc checks and was found to have Hb in the 4.9 and was told to come to ED. Pt c/o faigue. Pt denies any  dizziness, lightheadedness, dyspnea, chest discomfort, melena/hematochezia.   Pt reports that for the past 3 years she has been having recurrent anemia. 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  In ED, VSS,  2 unit prbc ordered for pt and IV pantoprazole push.    #Chronic GIB  -admit  -consult GI  -trend cbc  -pt has port accessed in ED  -change pantoprazole to IV  -avoid nsaids  -clear liquid diet for now pending GI eval    #anemia secondary to chronic blood loss  -pt follows with hematology Dr Gaston, receives iron infusions  -2 unit PRBC ordered by ED  -trend cbc  -transfuse prn    #CKD stage 4  -creatinine at baseline 2.6  -trend level    #severe AS  -pt reports recommended for valve replacement when cleared by GI    #HTN  -low sodium diet  -monitor bp  -cont meds from home lasix and metoprolol    #DVT prophylaxis  -SCD only  -no anticoagulants as high risk for bleeding   Patient is a 76yo F with a PMHx of HTN, CKD stage 4, Severe AS, Anemia due to iron deficiency in setting of multiple gastric and duodenal AVMs presented to ED with c/o low Hb. Pt has nurse come to her house for weekly blood draw for cbc checks and was found to have Hb in the 4.9 and was told to come to ED. Pt c/o faigue. Pt denies any  dizziness, lightheadedness, dyspnea, chest discomfort, melena/hematochezia.   Pt reports that for the past 3 years she has been having recurrent anemia. 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  In ED, VSS, 2 unit prbc ordered for pt and IV pantoprazole push.    #Chronic GIB  -consult GI  -Repeat cbc 10pm  -pt has port accessed in ED  -change pantoprazole to IV BID  -avoid nsaids  -clear liquid diet for now pending GI eval    #anemia secondary to chronic blood loss  -pt follows with hematology Dr Gaston, receives iron infusions  -2 unit PRBC ordered by ED  -trend cbc  -transfuse prn    Hold Furosemide 40mg    #CKD stage 4  -creatinine at baseline 2.6  -trend level    #severe AS  -pt reports recommended for valve replacement when cleared by GI  -Continue metoprolol  -Hold lasix for now given anemia    #HTN  -low sodium diet  -monitor bp  -cont meds from home lasix and metoprolol    #DVT prophylaxis  -SCD only  -no anticoagulants as high risk for bleeding   Patient is a 78yo F with a PMHx of HTN, CKD stage 4, Severe AS, Anemia due to iron deficiency in setting of multiple gastric and duodenal AVMs presented to ED with c/o low Hb. Pt has nurse come to her house for weekly blood draw for cbc checks and was found to have Hb in the 4.9 and was told to come to ED. Pt c/o faigue. Pt denies any  dizziness, lightheadedness, dyspnea, chest discomfort, melena/hematochezia.   Pt reports that for the past 3 years she has been having recurrent anemia. 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  In ED, VSS, 2 unit prbc ordered for pt and IV pantoprazole push.    #Chronic GIB  -consult GI  -Repeat cbc 10pm  -pt has port accessed in ED  -change pantoprazole to IV BID  -avoid nsaids  -clear liquid diet for now pending GI eval    #anemia secondary to chronic blood loss  -pt follows with hematology Dr Gaston, receives iron infusions  -2 unit PRBC ordered by ED  -trend cbc  -transfuse prn    Hold Furosemide 40mg    #CKD stage 4  -creatinine at baseline 2.6  -trend level    #severe AS  -pt reports recommended for valve replacement when cleared by GI  -Continue metoprolol  -Hold lasix for now given anemia    #HTN  -low sodium diet  -monitor bp  -cont home metoprolol, hold lasix    #DVT prophylaxis  -SCD only  -no anticoagulants as high risk for bleeding    GOC attempted but wants to think about it    DVT: holding  Dispo: acute

## 2023-05-18 NOTE — ED PROVIDER NOTE - CLINICAL SUMMARY MEDICAL DECISION MAKING FREE TEXT BOX
labs showing marked anemia w/ hgb <5  labs otherwise at baseline  given hx gib and drop, will admit for further monitoring, gi eval

## 2023-05-18 NOTE — H&P ADULT - NSHPPHYSICALEXAM_GEN_ALL_CORE
Vital Signs Last 24 Hrs  T(C): 36 (18 May 2023 15:02), Max: 36 (18 May 2023 15:02)  T(F): 96.8 (18 May 2023 15:02), Max: 96.8 (18 May 2023 15:02)  HR: 74 (18 May 2023 15:02) (74 - 74)  BP: 136/60 (18 May 2023 15:02) (136/60 - 136/60)  BP(mean): --  RR: 18 (18 May 2023 15:02) (18 - 18)  SpO2: 100% (18 May 2023 15:02) (100% - 100%)    PHYSICAL EXAM:      Constitutional: A&Ox4, NAD, obese  Respiratory: cta b/l  chest-+port on right  Cardiovascular: s1 s2 rrr, +3/6 systolic murmur heard throughout precordium and b/l carotids  Gastrointestinal: soft nt  nd + bs no rebound or guarding  Genitourinary: no cva tenderness  Extremities: normal rom, no edema, calf tenderness  Neurological:no focal deficits  Skin: no rash

## 2023-05-18 NOTE — ED ADULT NURSE NOTE - NSFALLUNIVINTERV_ED_ALL_ED
Bed/Stretcher in lowest position, wheels locked, appropriate side rails in place/Call bell, personal items and telephone in reach/Instruct patient to call for assistance before getting out of bed/chair/stretcher/Non-slip footwear applied when patient is off stretcher/[place] to call system/Physically safe environment - no spills, clutter or unnecessary equipment/Purposeful proactive rounding/Room/bathroom lighting operational, light cord in reach

## 2023-05-18 NOTE — H&P ADULT - HISTORY OF PRESENT ILLNESS
Patient is a 78yo F with a PMHx of HTN, CKD stage 4, Severe AS, Anemia due to iron deficiency in setting of multiple gastric and duodenal AVMs presented to ED with c/o low Hb. Pt has nurse come to her house for weekly blood draw for cbc checks and was found to have Hb in the 4.9 and was told to come to ED. Pt c/o faigue. Pt denies any  dizziness, lightheadedness, dyspnea, chest discomfort, melena/hematochezia. Only states that she felt "wobbly" prior t  Pt lives with son and daughter and is entirely independent, cooks for herself and her children.  Pt reports that for the past 3 years she has been having recurrent anemia. 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  In ED, VSS,  2 unit prbc ordered for pt and IV pantoprazole push. Patient is a 78yo F with a PMHx of HTN, CKD stage 4, Severe AS, Anemia due to iron deficiency in setting of multiple gastric and duodenal AVMs presented to ED with c/o low Hb. Pt has nurse come to her house for weekly blood draw for cbc checks and was found to have Hb in the 4.9 and was told to come to ED. Pt c/o faigue. Pt denies any  dizziness, lightheadedness, dyspnea, chest discomfort, melena/hematochezia.   Pt reports that for the past 3 years she has been having recurrent anemia. 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  In ED, VSS,  2 unit prbc ordered for pt and IV pantoprazole push. Patient is a 78yo F with a PMHx of HTN, CKD stage 4, Severe AS, Anemia due to iron deficiency in setting of multiple gastric and duodenal AVMs presented to ED with c/o low Hb. Pt has nurse come to her house for weekly blood draw for cbc checks and was found to have Hb in the 4.9 and was told to come to ED. Pt c/o faigue. Pt denies any dizziness, lightheadedness, dyspnea, chest discomfort, melena/hematochezia.   Pt reports that for the past 3 years she has been having recurrent anemia. 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  In ED, VSS,  2 unit prbc ordered for pt and IV pantoprazole push.

## 2023-05-18 NOTE — ED PROVIDER NOTE - PROGRESS NOTE DETAILS
Authored by Dr. Pisano: Pt did not take lasix 40mg today, will give dose now. will order 2 units prbc given severe aortic stenosis and administer slowly.

## 2023-05-18 NOTE — PATIENT PROFILE ADULT - FALL HARM RISK - UNIVERSAL INTERVENTIONS
Bed in lowest position, wheels locked, appropriate side rails in place/Call bell, personal items and telephone in reach/Instruct patient to call for assistance before getting out of bed or chair/Non-slip footwear when patient is out of bed/[place] to call system/Physically safe environment - no spills, clutter or unnecessary equipment/Purposeful Proactive Rounding/Room/bathroom lighting operational, light cord in reach

## 2023-05-19 ENCOUNTER — APPOINTMENT (OUTPATIENT)
Dept: INFUSION THERAPY | Facility: CLINIC | Age: 78
End: 2023-05-19

## 2023-05-19 LAB
ANION GAP SERPL CALC-SCNC: 7 MMOL/L — SIGNIFICANT CHANGE UP (ref 7–14)
APTT BLD: 31.4 SEC — SIGNIFICANT CHANGE UP (ref 27–39.2)
BASOPHILS # BLD AUTO: 0.01 K/UL — SIGNIFICANT CHANGE UP (ref 0–0.2)
BASOPHILS NFR BLD AUTO: 0.4 % — SIGNIFICANT CHANGE UP (ref 0–1)
BUN SERPL-MCNC: 69 MG/DL — CRITICAL HIGH (ref 10–20)
CALCIUM SERPL-MCNC: 7.8 MG/DL — LOW (ref 8.4–10.5)
CHLORIDE SERPL-SCNC: 108 MMOL/L — SIGNIFICANT CHANGE UP (ref 98–110)
CO2 SERPL-SCNC: 25 MMOL/L — SIGNIFICANT CHANGE UP (ref 17–32)
CREAT SERPL-MCNC: 2.6 MG/DL — HIGH (ref 0.7–1.5)
EGFR: 18 ML/MIN/1.73M2 — LOW
EOSINOPHIL # BLD AUTO: 0.18 K/UL — SIGNIFICANT CHANGE UP (ref 0–0.7)
EOSINOPHIL NFR BLD AUTO: 6.5 % — SIGNIFICANT CHANGE UP (ref 0–8)
GLUCOSE SERPL-MCNC: 92 MG/DL — SIGNIFICANT CHANGE UP (ref 70–99)
HCT VFR BLD CALC: 21 % — LOW (ref 37–47)
HCT VFR BLD CALC: 23.4 % — LOW (ref 37–47)
HGB BLD-MCNC: 6.5 G/DL — CRITICAL LOW (ref 12–16)
HGB BLD-MCNC: 7.4 G/DL — LOW (ref 12–16)
IMM GRANULOCYTES NFR BLD AUTO: 0.4 % — HIGH (ref 0.1–0.3)
INR BLD: 0.97 RATIO — SIGNIFICANT CHANGE UP (ref 0.65–1.3)
LYMPHOCYTES # BLD AUTO: 0.52 K/UL — LOW (ref 1.2–3.4)
LYMPHOCYTES # BLD AUTO: 18.7 % — LOW (ref 20.5–51.1)
MCHC RBC-ENTMCNC: 28.9 PG — SIGNIFICANT CHANGE UP (ref 27–31)
MCHC RBC-ENTMCNC: 29 PG — SIGNIFICANT CHANGE UP (ref 27–31)
MCHC RBC-ENTMCNC: 31 G/DL — LOW (ref 32–37)
MCHC RBC-ENTMCNC: 31.6 G/DL — LOW (ref 32–37)
MCV RBC AUTO: 91.4 FL — SIGNIFICANT CHANGE UP (ref 81–99)
MCV RBC AUTO: 93.8 FL — SIGNIFICANT CHANGE UP (ref 81–99)
MONOCYTES # BLD AUTO: 0.27 K/UL — SIGNIFICANT CHANGE UP (ref 0.1–0.6)
MONOCYTES NFR BLD AUTO: 9.7 % — HIGH (ref 1.7–9.3)
NEUTROPHILS # BLD AUTO: 1.79 K/UL — SIGNIFICANT CHANGE UP (ref 1.4–6.5)
NEUTROPHILS NFR BLD AUTO: 64.3 % — SIGNIFICANT CHANGE UP (ref 42.2–75.2)
NRBC # BLD: 0 /100 WBCS — SIGNIFICANT CHANGE UP (ref 0–0)
NRBC # BLD: 0 /100 WBCS — SIGNIFICANT CHANGE UP (ref 0–0)
PLATELET # BLD AUTO: 100 K/UL — LOW (ref 130–400)
PLATELET # BLD AUTO: 94 K/UL — LOW (ref 130–400)
PMV BLD: 10 FL — SIGNIFICANT CHANGE UP (ref 7.4–10.4)
PMV BLD: 10.4 FL — SIGNIFICANT CHANGE UP (ref 7.4–10.4)
POTASSIUM SERPL-MCNC: 4.3 MMOL/L — SIGNIFICANT CHANGE UP (ref 3.5–5)
POTASSIUM SERPL-SCNC: 4.3 MMOL/L — SIGNIFICANT CHANGE UP (ref 3.5–5)
PROTHROM AB SERPL-ACNC: 11.1 SEC — SIGNIFICANT CHANGE UP (ref 9.95–12.87)
RBC # BLD: 2.24 M/UL — LOW (ref 4.2–5.4)
RBC # BLD: 2.56 M/UL — LOW (ref 4.2–5.4)
RBC # FLD: 17.4 % — HIGH (ref 11.5–14.5)
RBC # FLD: 17.8 % — HIGH (ref 11.5–14.5)
SODIUM SERPL-SCNC: 140 MMOL/L — SIGNIFICANT CHANGE UP (ref 135–146)
WBC # BLD: 2.78 K/UL — LOW (ref 4.8–10.8)
WBC # BLD: 3.25 K/UL — LOW (ref 4.8–10.8)
WBC # FLD AUTO: 2.78 K/UL — LOW (ref 4.8–10.8)
WBC # FLD AUTO: 3.25 K/UL — LOW (ref 4.8–10.8)

## 2023-05-19 PROCEDURE — 99222 1ST HOSP IP/OBS MODERATE 55: CPT

## 2023-05-19 PROCEDURE — 99232 SBSQ HOSP IP/OBS MODERATE 35: CPT

## 2023-05-19 RX ORDER — DIPHENHYDRAMINE HCL 50 MG
25 CAPSULE ORAL EVERY 12 HOURS
Refills: 0 | Status: DISCONTINUED | OUTPATIENT
Start: 2023-05-19 | End: 2023-05-20

## 2023-05-19 RX ORDER — SENNA PLUS 8.6 MG/1
2 TABLET ORAL AT BEDTIME
Refills: 0 | Status: DISCONTINUED | OUTPATIENT
Start: 2023-05-19 | End: 2023-05-20

## 2023-05-19 RX ORDER — FUROSEMIDE 40 MG
40 TABLET ORAL DAILY
Refills: 0 | Status: DISCONTINUED | OUTPATIENT
Start: 2023-05-19 | End: 2023-05-20

## 2023-05-19 RX ADMIN — Medication 1 MILLIGRAM(S): at 11:05

## 2023-05-19 RX ADMIN — PANTOPRAZOLE SODIUM 40 MILLIGRAM(S): 20 TABLET, DELAYED RELEASE ORAL at 18:20

## 2023-05-19 RX ADMIN — PANTOPRAZOLE SODIUM 40 MILLIGRAM(S): 20 TABLET, DELAYED RELEASE ORAL at 05:08

## 2023-05-19 RX ADMIN — Medication 25 MILLIGRAM(S): at 18:19

## 2023-05-19 RX ADMIN — Medication 25 MILLIGRAM(S): at 19:01

## 2023-05-19 RX ADMIN — Medication 40 MILLIGRAM(S): at 18:18

## 2023-05-19 NOTE — CONSULT NOTE ADULT - SUBJECTIVE AND OBJECTIVE BOX
Chief complaint/Reason for consult: acute on chronic anemia    HPI:  Patient is a 76yo F with a PMHx of HTN, CKD stage 4, Severe AS, Anemia due to iron deficiency in setting of multiple gastric and duodenal AVMs presented to ED with c/o low Hb. Pt has nurse come to her house for weekly blood draw for cbc checks and was found to have Hb in the 4.9 and was told to come to ED. Pt c/o faigue. Pt denies any dizziness, lightheadedness, dyspnea, chest discomfort, melena/hematochezia.   Pt reports that for the past 3 years she has been having recurrent anemia. 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  In ED, VSS,  2 unit prbc ordered for pt and IV pantoprazole push. (18 May 2023 17:29)    GI Updates: 77yFemale pmh HTN, eczema, severe AS awaiting TAVR presents for symptomatic anemia has weekly blood draws. Patient with enteroscopies with Dr. Calvo. Patient denies nausea, vomiting, hematemesis, melena, blood in stool, diarrhea, constipation, abdominal pain.      PAST MEDICAL & SURGICAL HISTORY:   HTN (hypertension)      Heart murmur      Eczema      Anemia  iron infusions and PRBC transfusions      Aortic stenosis      Chronic kidney disease (CKD)      2019 novel coronavirus disease (COVID-19)  2020- [facility] admission      H/O appendicitis      H/O cholecystitis  s/p cholecystectomy            Family history:  FAMILY HISTORY:  FH: kidney disease (Father)      No GI cancers in first or second degree relatives    Social History: No smoking. No alcohol. No illegal drug use.    Allergies:   aspirin (Rash; Other)  latex (Unknown)  EKG leads (Hives)  penicillins (Rash; Urticaria; Hives; Blisters)      MEDICATIONS: Home Medications:  folic acid 1 mg oral tablet: 1 tab(s) orally once a day (18 May 2023 17:22)  furosemide 40 mg oral tablet: 1 tab(s) orally once a day (18 May 2023 17:22)  metoprolol tartrate 25 mg oral tablet: 1 tab(s) orally 2 times a day (18 May 2023 17:22)    MEDICATIONS  (STANDING):  folic acid 1 milliGRAM(s) Oral daily  metoprolol tartrate 25 milliGRAM(s) Oral two times a day  pantoprazole  Injectable 40 milliGRAM(s) IV Push every 12 hours    MEDICATIONS  (PRN):  acetaminophen     Tablet .. 650 milliGRAM(s) Oral every 6 hours PRN Temp greater or equal to 38C (100.4F), Mild Pain (1 - 3)  diphenhydrAMINE 25 milliGRAM(s) Oral every 12 hours PRN Rash and/or Itching  melatonin 3 milliGRAM(s) Oral at bedtime PRN Insomnia  ondansetron Injectable 4 milliGRAM(s) IV Push every 8 hours PRN Nausea and/or Vomiting        REVIEW OF SYSTEMS  General:  No weight loss, fevers, or chills.  Eyes:  No reported pain or visual changes  ENT:  No sore throat or runny nose.  NECK: No stiffness or lymphadenopathy  CV:  No chest pain or palpitations.  Resp:  No shortness of breath, cough, wheezing or hemoptysis  GI:  No abdominal pain, nausea, vomiting, dysphagia, diarrhea or constipation. No rectal bleeding, melena, or hematemesis.  Neuro:  No tingling, numbness       VITALS:   T(F): 96 (23 @ 04:48), Max: 97.6 (23 @ 20:59)  HR: 71 (23 @ 04:48) (71 - 74)  BP: 117/54 (23 @ 04:48) (106/51 - 136/60)  RR: 18 (23 @ 04:48) (18 - 18)  SpO2: 98% (23 @ 08:47) (98% - 100%)    PHYSICAL EXAM:  GENERAL: AAOx3, no acute distress.  HEAD:  Atraumatic, Normocephalic  EYES: conjunctiva and sclera clear  NECK: Supple, No thyromegaly   CHEST/LUNG: Clear to auscultation bilaterally; No wheeze, rhonchi, or rales  HEART: Regular rate and rhythm; normal S1, S2, No murmurs.  ABDOMEN: Soft, nontender, nondistended; Bowel sounds present  NEUROLOGY: No asterixis or tremor  SKIN: Intact, no jaundice          LABS:  05-19    140  |  108  |  69<HH>  ----------------------------<  92  4.3   |  25  |  2.6<H>    Ca    7.8<L>      19 May 2023 07:45    TPro  4.8<L>  /  Alb  2.9<L>  /  TBili  0.4  /  DBili  x   /  AST  11  /  ALT  <5  /  AlkPhos  64  05-18                          6.5    2.78  )-----------( 94       ( 19 May 2023 07:45 )             21.0     LIVER FUNCTIONS - ( 18 May 2023 15:17 )  Alb: 2.9 g/dL / Pro: 4.8 g/dL / ALK PHOS: 64 U/L / ALT: <5 U/L / AST: 11 U/L / GGT: x           PT/INR - ( 19 May 2023 07:45 )   PT: 11.10 sec;   INR: 0.97 ratio         PTT - ( 19 May 2023 07:45 )  PTT:31.4 sec    IMAGING:    < from: Enteroscopy (23 @ 10:30) >  Enteroscopy Report    Date: 4/3/2023 10:30 AM    Patient Name: LATRICIA ARREAGA    MRN: [id number]    Account Number:    [id number]    Gender: Female     (age): 1945 (77)    Instrument(s):    PCF-H190L ([id number])([id number])    Attending/Fellow:    MD Jaime Bell MD        Procedure Room #:    [place] [address]    Referring Physician:    OSMANI RUFFIN    [address] [place] [address] [phone] (phone)    [phone] (fax)        Nurse(s):    Vivian Gan    ASA Class:    P3 - 4/3/2023 11:25 AM Jaime Esparza MD    History of Present Illness:    H&P was previously reviewed.    Administered Medications:    As per Anesthesiology Record    Indications:    Anemia: 285.9 - D64.9    Procedure:    The procedure, indications, preparation and potential complications were  explained to the patient, who indicated understanding and signed the  corresponding consent forms. MAC was administered by anesthesiologist.  Continuous pulse oximetry and blood pressure monitoring were used throughout the  procedure. Supplemental oxygen was used. Patient was placed in the left lateral  decubitus position. The endoscope was introduced through the mouth and advanced  under direct visualization until the jejunum (130 cm from incisors) was reached.  Patient tolerance to the procedure was good. The procedure was not difficult.  Blood loss was none.    Limitations/Complications:    There were no apparent limitations or complications    Findings:    EsophagusMucosa Normal mucosa was noted in the whole esophagus.    Stomach Mucosa Normal mucosa was noted in the whole stomach.    Duodenum Additional duodenum findings The bulbar mucosa was nodular. This could  be due to underlying lymphoid hyperplasia. Biopsies taken...    -An oozing Dieulafoy lesion was seen in the Third part of the duodenum. Three  endoclips were applied to the Third part of the duodenum for the purpose of  hemostasis..    Jejunum Mucosa An oozing Dieulafoy lesion was seen in the in the Jejunum (120 cm  from incisors). One endoclip were applied to the Third part of the Jejunum for  the purpose of hemostasis.    Impressions:    Normal mucosa in the whole esophagus.    Normal mucosa in the whole stomach.    -The bulbar mucosa was nodular. This could be due to underlying lymphoid  hyperplasia. Biopsies taken. .    -An oozing Dieulafoy lesion was seen in the Third part of the duodenum. Three  endoclips were applied to the Third part of the duodenum for the purpose of  hemostasis. .    Abnormal mucosa in the Jejunum (120 cm from incisors).    Plan:    Advance diet as tolerated    Await pathology    Resume all necessary anticoagulation.    Trend CBC    Follow up visit in 2-3 weeks        Attending Participation:    I was presentand participated during the entire procedure, including non-key  portions.    Umesh Powell MD    Version 1, Electronically signed on 2023 8:24:37 AM by MD Jaiem Bell MD    < end of copied text >

## 2023-05-19 NOTE — CONSULT NOTE ADULT - ASSESSMENT
77yFemale pmh HTN, eczema, severe AS awaiting TAVR presents for symptomatic anemia has weekly blood draws. Patient with enteroscopies with Dr. Calvo.     EGD Dr. Robles 1/9/2020  Impressions:  prominent brunners gland  duodenitis   Colonoscopy Dr. Robles 10/22/2019  Impressions:  moderate diverticulosis  -grade 2 internal hemorrhoids  -solid stool in some spots throughout the colon    s/p Enteroscopy 4/14/23 Dr. Calvo  Impressions:  -Normal mucosa in the whole esophagus.  -Normal mucosa in the whole stomach.  -The bulbar mucosa was nodular. This could be due to underlying lymphoid  hyperplasia. Biopsies taken. .  -An oozing Dieulafoy lesion was seen in the Third part of the duodenum. Three  endoclips were applied to the Third part of the duodenum for the purpose of  hemostasis. .  Abnormal mucosa in the Jejunum (120 cm from incisors).    Problem 1-Acute on chronic Anemia  rectal exam-brown stool in rectal vault  Rec  -diet as tolerated  - Follow up with our GI office located on [address] [place] [address], Phone number [phone] with Dr. Calvo for outpatient follow-up  -hematology consult  -Maintain Hemodynamic Stability   -Monitor CBC  -CMP,Optimize Electrolytes  -PT,PTT,INR  -EKG, Chest-Xray   -Transfuse prn to hgb >8  -Two large bore IV lines  - PPI BID  -Monitor Vital Signs  -Monitor Stool For blood, frequency, consistency, melena  -Active Type and Screen  -Iron Studies, Folate, Vitamin B12 levels

## 2023-05-19 NOTE — PROGRESS NOTE ADULT - SUBJECTIVE AND OBJECTIVE BOX
Progress note    INTERVAL HPI/OVERNIGHT EVENTS:          REVIEW OF SYSTEMS:  All other 13 Review of systems were reviewed and are negative    FAMILY HISTORY:  FH: kidney disease (Father)      T(C): 35.6 (05-19-23 @ 13:57), Max: 36.4 (05-18-23 @ 20:59)  HR: 70 (05-19-23 @ 13:57) (70 - 72)  BP: 121/55 (05-19-23 @ 13:57) (106/51 - 121/55)  RR: 18 (05-19-23 @ 13:57) (18 - 18)  SpO2: 98% (05-19-23 @ 08:47) (98% - 98%)  Wt(kg): --Vital Signs Last 24 Hrs  T(C): 35.6 (19 May 2023 13:57), Max: 36.4 (18 May 2023 20:59)  T(F): 96.1 (19 May 2023 13:57), Max: 97.6 (18 May 2023 20:59)  HR: 70 (19 May 2023 13:57) (70 - 72)  BP: 121/55 (19 May 2023 13:57) (106/51 - 121/55)  BP(mean): --  RR: 18 (19 May 2023 13:57) (18 - 18)  SpO2: 98% (19 May 2023 08:47) (98% - 98%)    Parameters below as of 19 May 2023 08:47  Patient On (Oxygen Delivery Method): room air      aspirin (Rash; Other)  latex (Unknown)  EKG leads (Hives)  penicillins (Rash; Urticaria; Hives; Blisters)      PHYSICAL EXAM:  GENERAL: NAD, well-groomed, well-developed  HEAD:  Atraumatic, Normocephalic  EYES: EOMI, PERRLA, conjunctiva and sclera clear  ENMT: No tonsillar erythema, exudates, or enlargement; Moist mucous membranes, Good dentition, No lesions  NECK: Supple, No JVD, Normal thyroid  NERVOUS SYSTEM:  Alert & Oriented X3, Good concentration; Motor Strength 5/5 B/L upper and lower extremities; DTRs 2+ intact and symmetric  CHEST/LUNG: Clear to percussion bilaterally; No rales, rhonchi, wheezing, or rubs  HEART: Regular rate and rhythm; No murmurs, rubs, or gallops  ABDOMEN: Soft, Nontender, Nondistended; Bowel sounds present  EXTREMITIES:  2+ Peripheral Pulses, No clubbing, cyanosis, or edema  LYMPH: No lymphadenopathy noted  SKIN: No rashes or lesions    Consultant(s) Notes Reviewed:  [x ] YES  [ ] NO  Care Discussed with Consultants/Other Providers [ x] YES  [ ] NO    LABS:      RADIOLOGY & ADDITIONAL TESTS:    Imaging Personally Reviewed:  [ ] YES  [ ] NO  acetaminophen     Tablet .. 650 milliGRAM(s) Oral every 6 hours PRN  diphenhydrAMINE 25 milliGRAM(s) Oral every 12 hours PRN  folic acid 1 milliGRAM(s) Oral daily  melatonin 3 milliGRAM(s) Oral at bedtime PRN  metoprolol tartrate 25 milliGRAM(s) Oral two times a day  ondansetron Injectable 4 milliGRAM(s) IV Push every 8 hours PRN  pantoprazole  Injectable 40 milliGRAM(s) IV Push every 12 hours  senna 2 Tablet(s) Oral at bedtime      HEALTH ISSUES - PROBLEM Dx:          Total time spent to complete patient's bedside assessment, review medical chart, discuss medical plan of care with covering medical team was ________ with > 50% of time spent face to face w/ patient, discussion with patient/family and/or coordination of care Progress note    INTERVAL HPI/OVERNIGHT EVENTS:    Patient seen and examined at bedside. No acute distress. She denies any light headedness, vision changes.      REVIEW OF SYSTEMS:  All other 13 Review of systems were reviewed and are negative    FAMILY HISTORY:  FH: kidney disease (Father)      T(C): 35.6 (05-19-23 @ 13:57), Max: 36.4 (05-18-23 @ 20:59)  HR: 70 (05-19-23 @ 13:57) (70 - 72)  BP: 121/55 (05-19-23 @ 13:57) (106/51 - 121/55)  RR: 18 (05-19-23 @ 13:57) (18 - 18)  SpO2: 98% (05-19-23 @ 08:47) (98% - 98%)  Wt(kg): --Vital Signs Last 24 Hrs  T(C): 35.6 (19 May 2023 13:57), Max: 36.4 (18 May 2023 20:59)  T(F): 96.1 (19 May 2023 13:57), Max: 97.6 (18 May 2023 20:59)  HR: 70 (19 May 2023 13:57) (70 - 72)  BP: 121/55 (19 May 2023 13:57) (106/51 - 121/55)  BP(mean): --  RR: 18 (19 May 2023 13:57) (18 - 18)  SpO2: 98% (19 May 2023 08:47) (98% - 98%)    Parameters below as of 19 May 2023 08:47  Patient On (Oxygen Delivery Method): room air      aspirin (Rash; Other)  latex (Unknown)  EKG leads (Hives)  penicillins (Rash; Urticaria; Hives; Blisters)      PHYSICAL EXAM:  GENERAL: NAD, well-groomed, well-developed  HEAD:  Atraumatic, Normocephalic  EYES: EOMI, PERRLA, conjunctiva and sclera clear  ENMT: No tonsillar erythema, exudates, or enlargement; Moist mucous membranes, Good dentition, No lesions  NECK: Supple, No JVD, Normal thyroid  NERVOUS SYSTEM:  Alert & Oriented X3, Good concentration; Motor Strength 5/5 B/L upper and lower extremities; DTRs 2+ intact and symmetric  CHEST/LUNG: Clear to percussion bilaterally; No rales, rhonchi, wheezing, or rubs  HEART: Regular rate and rhythm; No murmurs, rubs, or gallops  ABDOMEN: Soft, Nontender, Nondistended; Bowel sounds present  EXTREMITIES:  2+ Peripheral Pulses, No clubbing, cyanosis, or edema  LYMPH: No lymphadenopathy noted  SKIN: No rashes or lesions    Consultant(s) Notes Reviewed:  [x ] YES  [ ] NO  Care Discussed with Consultants/Other Providers [ x] YES  [ ] NO    LABS:      RADIOLOGY & ADDITIONAL TESTS:    Imaging Personally Reviewed:  [ ] YES  [ ] NO  acetaminophen     Tablet .. 650 milliGRAM(s) Oral every 6 hours PRN  diphenhydrAMINE 25 milliGRAM(s) Oral every 12 hours PRN  folic acid 1 milliGRAM(s) Oral daily  melatonin 3 milliGRAM(s) Oral at bedtime PRN  metoprolol tartrate 25 milliGRAM(s) Oral two times a day  ondansetron Injectable 4 milliGRAM(s) IV Push every 8 hours PRN  pantoprazole  Injectable 40 milliGRAM(s) IV Push every 12 hours  senna 2 Tablet(s) Oral at bedtime      HEALTH ISSUES - PROBLEM Dx:    Patient is a 76yo F with a PMHx of HTN, CKD stage 4, Severe AS, Anemia due to iron deficiency in setting of multiple gastric and duodenal AVMs presented to ED with c/o low Hb. Pt has nurse come to her house for weekly blood draw for cbc checks and was found to have Hb in the 4.9 and was told to come to ED. Pt c/o faigue. Pt denies any  dizziness, lightheadedness, dyspnea, chest discomfort, melena/hematochezia. Pt reports that for the past 3 years she has been having recurrent anemia. 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    #Anemia secondary to chronic blood loss  #Chronic GIB  -s/p 3U blood  -IV protonix  -pt follows with hematology Dr Gaston, receives iron infusions  -Avoid NSAIDS  -GI recs appreciated    #CKD stage 4  -creatinine at baseline 2.6  -trend level    #severe AS  -pt reports recommended for valve replacement when cleared by GI  -Continue metoprolol  -Resume lasix     #HTN  -low sodium diet  -cont home metoprolol, resume lasix    #DVT prophylaxis  -SCD only  -no anticoagulants as high risk for bleeding    GOC attempted but wants to think about it    DVT: holding, SCD's for now  Dispo: acute    Total time spent to complete patient's bedside assessment, review medical chart, discuss medical plan of care with covering medical team was ____35____ with > 50% of time spent face to face w/ patient, discussion with patient/family and/or coordination of care Progress note    INTERVAL HPI/OVERNIGHT EVENTS:    Patient seen and examined at bedside. No acute distress. She denies any light headedness, vision changes.      REVIEW OF SYSTEMS:  All other 13 Review of systems were reviewed and are negative    FAMILY HISTORY:  FH: kidney disease (Father)      T(C): 35.6 (05-19-23 @ 13:57), Max: 36.4 (05-18-23 @ 20:59)  HR: 70 (05-19-23 @ 13:57) (70 - 72)  BP: 121/55 (05-19-23 @ 13:57) (106/51 - 121/55)  RR: 18 (05-19-23 @ 13:57) (18 - 18)  SpO2: 98% (05-19-23 @ 08:47) (98% - 98%)  Wt(kg): --Vital Signs Last 24 Hrs  T(C): 35.6 (19 May 2023 13:57), Max: 36.4 (18 May 2023 20:59)  T(F): 96.1 (19 May 2023 13:57), Max: 97.6 (18 May 2023 20:59)  HR: 70 (19 May 2023 13:57) (70 - 72)  BP: 121/55 (19 May 2023 13:57) (106/51 - 121/55)  BP(mean): --  RR: 18 (19 May 2023 13:57) (18 - 18)  SpO2: 98% (19 May 2023 08:47) (98% - 98%)    Parameters below as of 19 May 2023 08:47  Patient On (Oxygen Delivery Method): room air      aspirin (Rash; Other)  latex (Unknown)  EKG leads (Hives)  penicillins (Rash; Urticaria; Hives; Blisters)      PHYSICAL EXAM:  GENERAL: NAD, well-groomed, well-developed  HEAD:  Atraumatic, Normocephalic  EYES: EOMI, PERRLA, conjunctiva and sclera clear  ENMT: No tonsillar erythema, exudates, or enlargement; Moist mucous membranes, Good dentition, No lesions  NECK: Supple, No JVD, Normal thyroid  NERVOUS SYSTEM:  Alert & Oriented X3, Good concentration; Motor Strength 5/5 B/L upper and lower extremities; DTRs 2+ intact and symmetric  CHEST/LUNG: Clear to percussion bilaterally; No rales, rhonchi, wheezing, or rubs  HEART: Regular rate and rhythm; No murmurs, rubs, or gallops  ABDOMEN: Soft, Nontender, Nondistended; Bowel sounds present  EXTREMITIES:  2+ Peripheral Pulses, No clubbing, cyanosis, or edema  LYMPH: No lymphadenopathy noted  SKIN: No rashes or lesions    Consultant(s) Notes Reviewed:  [x ] YES  [ ] NO  Care Discussed with Consultants/Other Providers [ x] YES  [ ] NO    LABS:      RADIOLOGY & ADDITIONAL TESTS:    Imaging Personally Reviewed:  [ ] YES  [ ] NO  acetaminophen     Tablet .. 650 milliGRAM(s) Oral every 6 hours PRN  diphenhydrAMINE 25 milliGRAM(s) Oral every 12 hours PRN  folic acid 1 milliGRAM(s) Oral daily  melatonin 3 milliGRAM(s) Oral at bedtime PRN  metoprolol tartrate 25 milliGRAM(s) Oral two times a day  ondansetron Injectable 4 milliGRAM(s) IV Push every 8 hours PRN  pantoprazole  Injectable 40 milliGRAM(s) IV Push every 12 hours  senna 2 Tablet(s) Oral at bedtime      HEALTH ISSUES - PROBLEM Dx:    Patient is a 78yo F with a PMHx of HTN, CKD stage 4, Severe AS, Anemia due to iron deficiency in setting of multiple gastric and duodenal AVMs presented to ED with c/o low Hb. Pt has nurse come to her house for weekly blood draw for cbc checks and was found to have Hb in the 4.9 and was told to come to ED. Pt c/o faigue. Pt denies any  dizziness, lightheadedness, dyspnea, chest discomfort, melena/hematochezia. Pt reports that for the past 3 years she has been having recurrent anemia. 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    #Anemia secondary to chronic blood loss  #Chronic GIB  -s/p 3U blood (1 more U today given Hb 6.5)  -IV protonix  -pt follows with hematology Dr Gaston, receives iron infusions  -Avoid NSAIDS  -GI recs appreciated    #CKD stage 4  -creatinine at baseline 2.6  -trend level    #severe AS  -pt reports recommended for valve replacement when cleared by GI  -Continue metoprolol  -Resume lasix     #HTN  -low sodium diet  -cont home metoprolol, resume lasix    #DVT prophylaxis  -SCD only  -no anticoagulants as high risk for bleeding    GOC attempted but wants to think about it    DVT: holding, SCD's for now  Dispo: acute    Total time spent to complete patient's bedside assessment, review medical chart, discuss medical plan of care with covering medical team was ____35____ with > 50% of time spent face to face w/ patient, discussion with patient/family and/or coordination of care

## 2023-05-20 ENCOUNTER — TRANSCRIPTION ENCOUNTER (OUTPATIENT)
Age: 78
End: 2023-05-20

## 2023-05-20 VITALS
HEART RATE: 65 BPM | RESPIRATION RATE: 18 BRPM | TEMPERATURE: 96 F | DIASTOLIC BLOOD PRESSURE: 58 MMHG | SYSTOLIC BLOOD PRESSURE: 123 MMHG

## 2023-05-20 LAB
ANION GAP SERPL CALC-SCNC: 10 MMOL/L — SIGNIFICANT CHANGE UP (ref 7–14)
BUN SERPL-MCNC: 66 MG/DL — CRITICAL HIGH (ref 10–20)
CALCIUM SERPL-MCNC: 7.9 MG/DL — LOW (ref 8.4–10.5)
CHLORIDE SERPL-SCNC: 109 MMOL/L — SIGNIFICANT CHANGE UP (ref 98–110)
CO2 SERPL-SCNC: 22 MMOL/L — SIGNIFICANT CHANGE UP (ref 17–32)
CREAT SERPL-MCNC: 2.6 MG/DL — HIGH (ref 0.7–1.5)
EGFR: 18 ML/MIN/1.73M2 — LOW
FOLATE SERPL-MCNC: >20 NG/ML — SIGNIFICANT CHANGE UP
GLUCOSE SERPL-MCNC: 79 MG/DL — SIGNIFICANT CHANGE UP (ref 70–99)
HCT VFR BLD CALC: 24.3 % — LOW (ref 37–47)
HGB BLD-MCNC: 7.6 G/DL — LOW (ref 12–16)
IRON SATN MFR SERPL: 68 UG/DL — SIGNIFICANT CHANGE UP (ref 35–150)
MCHC RBC-ENTMCNC: 28.9 PG — SIGNIFICANT CHANGE UP (ref 27–31)
MCHC RBC-ENTMCNC: 31.3 G/DL — LOW (ref 32–37)
MCV RBC AUTO: 92.4 FL — SIGNIFICANT CHANGE UP (ref 81–99)
NRBC # BLD: 0 /100 WBCS — SIGNIFICANT CHANGE UP (ref 0–0)
PLATELET # BLD AUTO: 99 K/UL — LOW (ref 130–400)
PMV BLD: 10.4 FL — SIGNIFICANT CHANGE UP (ref 7.4–10.4)
POTASSIUM SERPL-MCNC: 4.4 MMOL/L — SIGNIFICANT CHANGE UP (ref 3.5–5)
POTASSIUM SERPL-SCNC: 4.4 MMOL/L — SIGNIFICANT CHANGE UP (ref 3.5–5)
RBC # BLD: 2.63 M/UL — LOW (ref 4.2–5.4)
RBC # FLD: 17.2 % — HIGH (ref 11.5–14.5)
SODIUM SERPL-SCNC: 141 MMOL/L — SIGNIFICANT CHANGE UP (ref 135–146)
VIT B12 SERPL-MCNC: 250 PG/ML — SIGNIFICANT CHANGE UP (ref 232–1245)
WBC # BLD: 2.92 K/UL — LOW (ref 4.8–10.8)
WBC # FLD AUTO: 2.92 K/UL — LOW (ref 4.8–10.8)

## 2023-05-20 PROCEDURE — 99239 HOSP IP/OBS DSCHRG MGMT >30: CPT

## 2023-05-20 RX ORDER — SENNA PLUS 8.6 MG/1
2 TABLET ORAL
Qty: 60 | Refills: 0
Start: 2023-05-20 | End: 2023-06-18

## 2023-05-20 RX ADMIN — Medication 40 MILLIGRAM(S): at 05:31

## 2023-05-20 RX ADMIN — Medication 25 MILLIGRAM(S): at 05:32

## 2023-05-20 RX ADMIN — Medication 1 MILLIGRAM(S): at 11:20

## 2023-05-20 NOTE — DISCHARGE NOTE PROVIDER - NSDCFUADDAPPT_GEN_ALL_CORE_FT
APPTS ARE READY TO BE MADE: [ ] YES    Best Family or Patient Contact (if needed):    Additional Information about above appointments (if needed):    1: Fly Li  2: Dr. Liu Gaston  3:     Other comments or requests:

## 2023-05-20 NOTE — DISCHARGE NOTE PROVIDER - NSDCMRMEDTOKEN_GEN_ALL_CORE_FT
folic acid 1 mg oral tablet: 1 tab(s) orally once a day  furosemide 40 mg oral tablet: 1 tab(s) orally once a day  metoprolol tartrate 25 mg oral tablet: 1 tab(s) orally 2 times a day  pantoprazole 40 mg oral delayed release tablet: 1 tab(s) orally 2 times a day   folic acid 1 mg oral tablet: 1 tab(s) orally once a day  furosemide 40 mg oral tablet: 1 tab(s) orally once a day  metoprolol tartrate 25 mg oral tablet: 1 tab(s) orally 2 times a day  pantoprazole 40 mg oral delayed release tablet: 1 tab(s) orally 2 times a day  senna leaf extract oral tablet: 2 tab(s) orally once a day (at bedtime)

## 2023-05-20 NOTE — DISCHARGE NOTE PROVIDER - PROVIDER TOKENS
PROVIDER:[TOKEN:[[id number]],FOLLOWUP:[1-3 days]] PROVIDER:[TOKEN:[[id number]],FOLLOWUP:[1-3 days]],PROVIDER:[TOKEN:[[id number]],FOLLOWUP:[1 week]]

## 2023-05-20 NOTE — DISCHARGE NOTE NURSING/CASE MANAGEMENT/SOCIAL WORK - PATIENT PORTAL LINK FT
You can access the FollowMyHealth Patient Portal offered by [facility] by registering at the following website: [email or web address]. By joining [employer]’s FollowMyHealth portal, you will also be able to view your health information using other applications (apps) compatible with our system.

## 2023-05-20 NOTE — CHART NOTE - NSCHARTNOTEFT_GEN_A_CORE
Hemoglobin noted 6.5 this morning s/p 2 units prbcs last night. Dr. Faye notified. Will give additional unit of pRBCS and recheck cbc post transfusion.                  6.5    2.78  )-----------( 94       ( 19 May 2023 07:45 )             21.0   05-19    140  |  108  |  69<HH>  ----------------------------<  92  4.3   |  25  |  2.6<H>    Ca    7.8<L>      19 May 2023 07:45    TPro  4.8<L>  /  Alb  2.9<L>  /  TBili  0.4  /  DBili  x   /  AST  11  /  ALT  <5  /  AlkPhos  64  05-18        PT/INR - ( 19 May 2023 07:45 )   PT: 11.10 sec;   INR: 0.97 ratio    PTT - ( 19 May 2023 07:45 )  PTT:31.4 sec
chemo-port   reaccessed     flushed well with good return     may use
pt was seen in chair alert, comfortable, NAD  port a cath in place right upper chest  port access needle removed  area cleaned with betadine, pressure applied   area covered with gauze and tegaderm  pt tolerated procedure well  monitor vss  monitor pt

## 2023-05-20 NOTE — DISCHARGE NOTE PROVIDER - NSDCFUSCHEDAPPT_GEN_ALL_CORE_FT
Reji Hernandez  [facility]  CARDIOLOGY [address]  Scheduled Appointment: 07/31/2023    Munir Paula  [facility]  CARDIOLOGY [address]  Scheduled Appointment: 08/08/2023

## 2023-05-20 NOTE — DISCHARGE NOTE PROVIDER - CARE PROVIDER_API CALL
Gildardo Bill  [address] [facility]  [address] [place] [address]  Phone: [phone]  Fax: [phone]  Follow Up Time: 1-3 days   Gildardo Bill  [address]  [address] [place] [address]  Phone: [phone]  Fax: [phone]  Follow Up Time: 1-3 days    Liu Gaston)  Internal Medicine; Medical Oncology  [address] [place] [address]  Phone: [phone]  Fax: [phone]  Follow Up Time: 1 week

## 2023-05-20 NOTE — DISCHARGE NOTE PROVIDER - HOSPITAL COURSE
Patient is a 76yo F with a PMHx of HTN, CKD stage 4, Severe AS, Anemia due to iron deficiency in setting of multiple gastric and duodenal AVMs presented to ED with c/o low Hb which identified via weekly cbc checks by visiting nurse. Pt reports that for the past 3 years she has been having recurrent anemia. 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 Patient received 3 units of packed red blood cells during the course of admission. Hemoglobin is now stable at 7.6 upon discharge.      Patient is a 76yo F with a PMHx of HTN, CKD stage 4, Severe AS, Anemia due to iron deficiency in setting of multiple gastric and duodenal AVMs presented to ED with c/o low Hb which identified via weekly cbc checks by visiting nurse. Pt reports that for the past 3 years she has been having recurrent anemia. 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 Patient received 3 units of packed red blood cells during the course of admission. Hemoglobin is now stable at 7.6 upon discharge. GI opted for no acute interventions while in the hospital. Patient did schedule appointment with her GI physician and will follow up with her hematologist as outpatient for blood work and iron/blood transfusions.

## 2023-05-20 NOTE — DISCHARGE NOTE PROVIDER - CARE PROVIDERS DIRECT ADDRESSES
[email or web address]

## 2023-05-20 NOTE — DISCHARGE NOTE PROVIDER - NSDCCPCAREPLAN_GEN_ALL_CORE_FT
PRINCIPAL DISCHARGE DIAGNOSIS  Diagnosis: Symptomatic anemia  Assessment and Plan of Treatment: hemoglobin 4.5, transfused 3 units pRBCs, hgb stable at 7.6 upon discharge. Please follow up with your primary care provider      SECONDARY DISCHARGE DIAGNOSES  Diagnosis: Pancytopenia  Assessment and Plan of Treatment:      PRINCIPAL DISCHARGE DIAGNOSIS  Diagnosis: Symptomatic anemia  Assessment and Plan of Treatment: hemoglobin 4.5, transfused 3 units pRBCs, hgb stable at 7.6 upon discharge. Please follow up with your primary care provider and hematologist      SECONDARY DISCHARGE DIAGNOSES  Diagnosis: Pancytopenia  Assessment and Plan of Treatment:      PRINCIPAL DISCHARGE DIAGNOSIS  Diagnosis: Symptomatic anemia  Assessment and Plan of Treatment: Your hemoglobin was 4.5 and you were transfused 3 units pRBCs. Your hemoglobin was stable at 7.6 upon discharge. Please follow up with your primary care provider and hematologist

## 2023-05-24 ENCOUNTER — NON-APPOINTMENT (OUTPATIENT)
Age: 78
End: 2023-05-24

## 2023-05-24 DIAGNOSIS — Z86.16 PERSONAL HISTORY OF COVID-19: ICD-10-CM

## 2023-05-24 DIAGNOSIS — Z91.048 OTHER NONMEDICINAL SUBSTANCE ALLERGY STATUS: ICD-10-CM

## 2023-05-24 DIAGNOSIS — K31.819 ANGIODYSPLASIA OF STOMACH AND DUODENUM WITHOUT BLEEDING: ICD-10-CM

## 2023-05-24 DIAGNOSIS — Z88.0 ALLERGY STATUS TO PENICILLIN: ICD-10-CM

## 2023-05-24 DIAGNOSIS — I12.9 HYPERTENSIVE CHRONIC KIDNEY DISEASE WITH STAGE 1 THROUGH STAGE 4 CHRONIC KIDNEY DISEASE, OR UNSPECIFIED CHRONIC KIDNEY DISEASE: ICD-10-CM

## 2023-05-24 DIAGNOSIS — L30.9 DERMATITIS, UNSPECIFIED: ICD-10-CM

## 2023-05-24 DIAGNOSIS — Z91.040 LATEX ALLERGY STATUS: ICD-10-CM

## 2023-05-24 DIAGNOSIS — N18.4 CHRONIC KIDNEY DISEASE, STAGE 4 (SEVERE): ICD-10-CM

## 2023-05-24 DIAGNOSIS — I35.0 NONRHEUMATIC AORTIC (VALVE) STENOSIS: ICD-10-CM

## 2023-05-24 DIAGNOSIS — Z90.49 ACQUIRED ABSENCE OF OTHER SPECIFIED PARTS OF DIGESTIVE TRACT: ICD-10-CM

## 2023-05-24 DIAGNOSIS — D50.0 IRON DEFICIENCY ANEMIA SECONDARY TO BLOOD LOSS (CHRONIC): ICD-10-CM

## 2023-05-24 DIAGNOSIS — K92.2 GASTROINTESTINAL HEMORRHAGE, UNSPECIFIED: ICD-10-CM

## 2023-05-24 DIAGNOSIS — Z79.899 OTHER LONG TERM (CURRENT) DRUG THERAPY: ICD-10-CM

## 2023-05-24 DIAGNOSIS — D61.818 OTHER PANCYTOPENIA: ICD-10-CM

## 2023-05-24 RX ORDER — FUROSEMIDE 40 MG/1
40 TABLET ORAL
Qty: 90 | Refills: 3 | Status: ACTIVE | COMMUNITY
Start: 2023-05-24 | End: 1900-01-01

## 2023-05-26 ENCOUNTER — APPOINTMENT (OUTPATIENT)
Dept: INFUSION THERAPY | Facility: CLINIC | Age: 78
End: 2023-05-26

## 2023-05-26 ENCOUNTER — OUTPATIENT (OUTPATIENT)
Dept: OUTPATIENT SERVICES | Facility: HOSPITAL | Age: 78
LOS: 1 days | End: 2023-05-26
Payer: MEDICARE

## 2023-05-26 DIAGNOSIS — D64.9 ANEMIA, UNSPECIFIED: ICD-10-CM

## 2023-05-26 DIAGNOSIS — Z87.19 PERSONAL HISTORY OF OTHER DISEASES OF THE DIGESTIVE SYSTEM: Chronic | ICD-10-CM

## 2023-05-26 PROCEDURE — 96372 THER/PROPH/DIAG INJ SC/IM: CPT

## 2023-05-26 PROCEDURE — 36430 TRANSFUSION BLD/BLD COMPNT: CPT

## 2023-05-26 PROCEDURE — P9040: CPT

## 2023-05-26 RX ORDER — ERYTHROPOIETIN 10000 [IU]/ML
30000 INJECTION, SOLUTION INTRAVENOUS; SUBCUTANEOUS ONCE
Refills: 0 | Status: COMPLETED | OUTPATIENT
Start: 2023-05-26 | End: 2023-05-26

## 2023-05-26 RX ORDER — FUROSEMIDE 40 MG
20 TABLET ORAL ONCE
Refills: 0 | Status: COMPLETED | OUTPATIENT
Start: 2023-05-26 | End: 2023-05-26

## 2023-05-26 RX ADMIN — Medication 20 MILLIGRAM(S): at 15:11

## 2023-05-26 RX ADMIN — ERYTHROPOIETIN 30000 UNIT(S): 10000 INJECTION, SOLUTION INTRAVENOUS; SUBCUTANEOUS at 14:18

## 2023-05-30 ENCOUNTER — INPATIENT (INPATIENT)
Facility: HOSPITAL | Age: 78
LOS: 2 days | Discharge: HOME HEALTH | DRG: 378 | End: 2023-06-02
Attending: INTERNAL MEDICINE | Admitting: INTERNAL MEDICINE
Payer: MEDICARE

## 2023-05-30 VITALS
TEMPERATURE: 97 F | HEIGHT: 67 IN | OXYGEN SATURATION: 99 % | DIASTOLIC BLOOD PRESSURE: 51 MMHG | HEART RATE: 74 BPM | RESPIRATION RATE: 20 BRPM | SYSTOLIC BLOOD PRESSURE: 113 MMHG

## 2023-05-30 DIAGNOSIS — Z90.49 ACQUIRED ABSENCE OF OTHER SPECIFIED PARTS OF DIGESTIVE TRACT: Chronic | ICD-10-CM

## 2023-05-30 DIAGNOSIS — Z98.890 OTHER SPECIFIED POSTPROCEDURAL STATES: Chronic | ICD-10-CM

## 2023-05-30 DIAGNOSIS — D50.9 IRON DEFICIENCY ANEMIA, UNSPECIFIED: ICD-10-CM

## 2023-05-30 DIAGNOSIS — Z87.19 PERSONAL HISTORY OF OTHER DISEASES OF THE DIGESTIVE SYSTEM: Chronic | ICD-10-CM

## 2023-05-30 LAB
ALBUMIN SERPL ELPH-MCNC: 2.9 G/DL — LOW (ref 3.5–5.2)
ALP SERPL-CCNC: 69 U/L — SIGNIFICANT CHANGE UP (ref 30–115)
ALT FLD-CCNC: 5 U/L — SIGNIFICANT CHANGE UP (ref 0–41)
ANION GAP SERPL CALC-SCNC: 8 MMOL/L — SIGNIFICANT CHANGE UP (ref 7–14)
ANISOCYTOSIS BLD QL: SIGNIFICANT CHANGE UP
APTT BLD: 43.6 SEC — HIGH (ref 27–39.2)
AST SERPL-CCNC: 14 U/L — SIGNIFICANT CHANGE UP (ref 0–41)
BASOPHILS # BLD AUTO: 0 K/UL — SIGNIFICANT CHANGE UP (ref 0–0.2)
BASOPHILS NFR BLD AUTO: 0 % — SIGNIFICANT CHANGE UP (ref 0–1)
BILIRUB SERPL-MCNC: 0.7 MG/DL — SIGNIFICANT CHANGE UP (ref 0.2–1.2)
BLD GP AB SCN SERPL QL: SIGNIFICANT CHANGE UP
BUN SERPL-MCNC: 69 MG/DL — CRITICAL HIGH (ref 10–20)
CALCIUM SERPL-MCNC: 7.9 MG/DL — LOW (ref 8.4–10.5)
CHLORIDE SERPL-SCNC: 110 MMOL/L — SIGNIFICANT CHANGE UP (ref 98–110)
CO2 SERPL-SCNC: 23 MMOL/L — SIGNIFICANT CHANGE UP (ref 17–32)
CREAT SERPL-MCNC: 2.5 MG/DL — HIGH (ref 0.7–1.5)
EGFR: 19 ML/MIN/1.73M2 — LOW
EOSINOPHIL # BLD AUTO: 0.14 K/UL — SIGNIFICANT CHANGE UP (ref 0–0.7)
EOSINOPHIL NFR BLD AUTO: 6.5 % — SIGNIFICANT CHANGE UP (ref 0–8)
GLUCOSE SERPL-MCNC: 99 MG/DL — SIGNIFICANT CHANGE UP (ref 70–99)
HCT VFR BLD CALC: 14.4 % — LOW (ref 37–47)
HGB BLD-MCNC: 4.5 G/DL — CRITICAL LOW (ref 12–16)
IMM GRANULOCYTES NFR BLD AUTO: 0.5 % — HIGH (ref 0.1–0.3)
INR BLD: 1.05 RATIO — SIGNIFICANT CHANGE UP (ref 0.65–1.3)
LACTATE SERPL-SCNC: 0.6 MMOL/L — LOW (ref 0.7–2)
LYMPHOCYTES # BLD AUTO: 0.41 K/UL — LOW (ref 1.2–3.4)
LYMPHOCYTES # BLD AUTO: 18.9 % — LOW (ref 20.5–51.1)
MANUAL SMEAR VERIFICATION: SIGNIFICANT CHANGE UP
MCHC RBC-ENTMCNC: 30.8 PG — SIGNIFICANT CHANGE UP (ref 27–31)
MCHC RBC-ENTMCNC: 31.3 G/DL — LOW (ref 32–37)
MCV RBC AUTO: 98.6 FL — SIGNIFICANT CHANGE UP (ref 81–99)
MONOCYTES # BLD AUTO: 0.27 K/UL — SIGNIFICANT CHANGE UP (ref 0.1–0.6)
MONOCYTES NFR BLD AUTO: 12.4 % — HIGH (ref 1.7–9.3)
NEUTROPHILS # BLD AUTO: 1.34 K/UL — LOW (ref 1.4–6.5)
NEUTROPHILS NFR BLD AUTO: 61.7 % — SIGNIFICANT CHANGE UP (ref 42.2–75.2)
NRBC # BLD: 0 /100 WBCS — SIGNIFICANT CHANGE UP (ref 0–0)
OVALOCYTES BLD QL SMEAR: SIGNIFICANT CHANGE UP
PLAT MORPH BLD: NORMAL — SIGNIFICANT CHANGE UP
PLATELET # BLD AUTO: 102 K/UL — LOW (ref 130–400)
PMV BLD: 10.7 FL — HIGH (ref 7.4–10.4)
POLYCHROMASIA BLD QL SMEAR: SIGNIFICANT CHANGE UP
POTASSIUM SERPL-MCNC: 4.7 MMOL/L — SIGNIFICANT CHANGE UP (ref 3.5–5)
POTASSIUM SERPL-SCNC: 4.7 MMOL/L — SIGNIFICANT CHANGE UP (ref 3.5–5)
PROT SERPL-MCNC: 4.7 G/DL — LOW (ref 6–8)
PROTHROM AB SERPL-ACNC: 12 SEC — SIGNIFICANT CHANGE UP (ref 9.95–12.87)
RBC # BLD: 1.46 M/UL — LOW (ref 4.2–5.4)
RBC # FLD: 19.2 % — HIGH (ref 11.5–14.5)
RBC BLD AUTO: ABNORMAL
SODIUM SERPL-SCNC: 141 MMOL/L — SIGNIFICANT CHANGE UP (ref 135–146)
TROPONIN T SERPL-MCNC: <0.01 NG/ML — SIGNIFICANT CHANGE UP
WBC # BLD: 2.17 K/UL — LOW (ref 4.8–10.8)
WBC # FLD AUTO: 2.17 K/UL — LOW (ref 4.8–10.8)

## 2023-05-30 PROCEDURE — C9113: CPT

## 2023-05-30 PROCEDURE — 36430 TRANSFUSION BLD/BLD COMPNT: CPT

## 2023-05-30 PROCEDURE — 85045 AUTOMATED RETICULOCYTE COUNT: CPT

## 2023-05-30 PROCEDURE — 93010 ELECTROCARDIOGRAM REPORT: CPT

## 2023-05-30 PROCEDURE — 99223 1ST HOSP IP/OBS HIGH 75: CPT

## 2023-05-30 PROCEDURE — 85025 COMPLETE CBC W/AUTO DIFF WBC: CPT

## 2023-05-30 PROCEDURE — 80053 COMPREHEN METABOLIC PANEL: CPT

## 2023-05-30 PROCEDURE — 99285 EMERGENCY DEPT VISIT HI MDM: CPT

## 2023-05-30 PROCEDURE — P9040: CPT

## 2023-05-30 PROCEDURE — 71045 X-RAY EXAM CHEST 1 VIEW: CPT | Mod: 26

## 2023-05-30 PROCEDURE — 97162 PT EVAL MOD COMPLEX 30 MIN: CPT | Mod: GP

## 2023-05-30 PROCEDURE — 36415 COLL VENOUS BLD VENIPUNCTURE: CPT

## 2023-05-30 PROCEDURE — C1889: CPT

## 2023-05-30 RX ORDER — SENNA PLUS 8.6 MG/1
2 TABLET ORAL AT BEDTIME
Refills: 0 | Status: DISCONTINUED | OUTPATIENT
Start: 2023-05-30 | End: 2023-06-02

## 2023-05-30 RX ORDER — HYOSCYAMINE SULFATE 0.13 MG
0.12 TABLET ORAL EVERY 8 HOURS
Refills: 0 | Status: DISCONTINUED | OUTPATIENT
Start: 2023-05-30 | End: 2023-06-02

## 2023-05-30 RX ORDER — DIPHENHYDRAMINE HCL 50 MG
25 CAPSULE ORAL EVERY 6 HOURS
Refills: 0 | Status: DISCONTINUED | OUTPATIENT
Start: 2023-05-30 | End: 2023-06-02

## 2023-05-30 RX ORDER — PANTOPRAZOLE SODIUM 20 MG/1
40 TABLET, DELAYED RELEASE ORAL EVERY 12 HOURS
Refills: 0 | Status: DISCONTINUED | OUTPATIENT
Start: 2023-05-30 | End: 2023-06-02

## 2023-05-30 RX ORDER — DIPHENHYDRAMINE HCL 50 MG
2 CAPSULE ORAL
Qty: 0 | Refills: 0 | DISCHARGE

## 2023-05-30 RX ORDER — METOPROLOL TARTRATE 50 MG
25 TABLET ORAL
Refills: 0 | Status: DISCONTINUED | OUTPATIENT
Start: 2023-05-30 | End: 2023-06-02

## 2023-05-30 RX ORDER — FOLIC ACID 0.8 MG
1 TABLET ORAL DAILY
Refills: 0 | Status: DISCONTINUED | OUTPATIENT
Start: 2023-05-30 | End: 2023-06-02

## 2023-05-30 RX ORDER — PANTOPRAZOLE SODIUM 20 MG/1
40 TABLET, DELAYED RELEASE ORAL ONCE
Refills: 0 | Status: COMPLETED | OUTPATIENT
Start: 2023-05-30 | End: 2023-05-30

## 2023-05-30 RX ORDER — FUROSEMIDE 40 MG
40 TABLET ORAL DAILY
Refills: 0 | Status: DISCONTINUED | OUTPATIENT
Start: 2023-05-31 | End: 2023-06-02

## 2023-05-30 RX ORDER — DIPHENHYDRAMINE HCL 50 MG
1 CAPSULE ORAL
Qty: 0 | Refills: 0 | DISCHARGE

## 2023-05-30 RX ADMIN — PANTOPRAZOLE SODIUM 40 MILLIGRAM(S): 20 TABLET, DELAYED RELEASE ORAL at 17:05

## 2023-05-30 RX ADMIN — Medication 25 MILLIGRAM(S): at 19:41

## 2023-05-30 RX ADMIN — Medication 25 MILLIGRAM(S): at 22:33

## 2023-05-30 NOTE — H&P ADULT - NSICDXPASTMEDICALHX_GEN_ALL_CORE_FT
PAST MEDICAL HISTORY:  2019 novel coronavirus disease (COVID-19) 4/2020- REHAB admission    Anemia iron infusions and PRBC transfusions    Aortic stenosis     Chronic kidney disease (CKD)     Eczema     Heart murmur     HTN (hypertension)      PAST MEDICAL HISTORY:  2019 novel coronavirus disease (COVID-19) 4/2020- REHAB admission    Anemia iron infusions and PRBC transfusions    Aortic stenosis     Chronic kidney disease (CKD)     Eczema     Gastric AVM     Heart murmur     HTN (hypertension)

## 2023-05-30 NOTE — H&P ADULT - NSICDXFAMILYHX_GEN_ALL_CORE_FT
FAMILY HISTORY:  Father  Still living? Unknown  FH: kidney disease, Age at diagnosis: Age Unknown     FAMILY HISTORY:  Father  Still living? Unknown  FH: kidney disease, Age at diagnosis: Age Unknown    Mother  Still living? Unknown  FH: breast cancer, Age at diagnosis: Age Unknown  FH: multiple myeloma, Age at diagnosis: Age Unknown

## 2023-05-30 NOTE — H&P ADULT - NSHPPHYSICALEXAM_GEN_ALL_CORE
Vital Signs Last 24 Hrs  T(C): 36.1 (30 May 2023 12:52), Max: 36.1 (30 May 2023 12:52)  T(F): 96.9 (30 May 2023 12:52), Max: 96.9 (30 May 2023 12:52)  HR: 74 (30 May 2023 12:52) (74 - 74)  BP: 113/51 (30 May 2023 12:52) (113/51 - 113/51)  RR: 20 (30 May 2023 12:52) (20 - 20)  SpO2: 99% (30 May 2023 12:52) (99% - 99%)    Parameters below as of 30 May 2023 12:52  Patient On (Oxygen Delivery Method): room air    PHYSICAL EXAM:  GENERAL: NAD, well-developed, pale  SKIN: No rashes or lesions  HEAD:  Atraumatic, Normocephalic  EYES: EOMI, PERRLA, conjunctiva and sclera clear  NECK: Supple, No JVD  CHEST/LUNG: Clear to auscultation bilaterally; No wheeze  HEART: Regular rate and rhythm; RSB murmur 3/6. distal pulses 2+ and equal bilateral  ABDOMEN: Soft, Nontender, Nondistended; Bowel sounds present  EXTREMITIES:  No clubbing, cyanosis, or edema  CNS: AAOx3 Vital Signs Last 24 Hrs  T(C): 36.1 (30 May 2023 12:52), Max: 36.1 (30 May 2023 12:52)  T(F): 96.9 (30 May 2023 12:52), Max: 96.9 (30 May 2023 12:52)  HR: 74 (30 May 2023 12:52) (74 - 74)  BP: 113/51 (30 May 2023 12:52) (113/51 - 113/51)  RR: 20 (30 May 2023 12:52) (20 - 20)  SpO2: 99% (30 May 2023 12:52) (99% - 99%)    Parameters below as of 30 May 2023 12:52  Patient On (Oxygen Delivery Method): room air    PHYSICAL EXAM:  GENERAL: NAD, well-developed, pale  SKIN: No rashes or lesions  HEAD:  Atraumatic, Normocephalic  EYES: EOMI, PERRLA, conjunctiva and sclera clear  NECK: Supple, No JVD  CHEST/LUNG: Clear to auscultation bilaterally; No wheeze  HEART: Regular rate and rhythm; RSB KATHYA 3/6. distal pulses 2+ and equal bilateral  ABDOMEN: Soft, Nontender, Nondistended; Bowel sounds present  EXTREMITIES:  No clubbing, cyanosis, or edema  CNS: AAOx3 Vital Signs Last 24 Hrs  T(C): 36.1 (30 May 2023 12:52), Max: 36.1 (30 May 2023 12:52)  T(F): 96.9 (30 May 2023 12:52), Max: 96.9 (30 May 2023 12:52)  HR: 74 (30 May 2023 12:52) (74 - 74)  BP: 113/51 (30 May 2023 12:52) (113/51 - 113/51)  RR: 20 (30 May 2023 12:52) (20 - 20)  SpO2: 99% (30 May 2023 12:52) (99% - 99%)    Parameters below as of 30 May 2023 12:52  Patient On (Oxygen Delivery Method): room air    PHYSICAL EXAM:  GENERAL: NAD, well-developed, pale  SKIN: No rashes or lesions  HEAD:  Atraumatic, Normocephalic  EYES: EOMI, PERRLA, conjunctival pallor and sclera clear  NECK: Supple, No JVD  CHEST/LUNG: Clear to auscultation bilaterally; No wheeze  HEART: Regular rate and rhythm; RSB Systolic M 2/6. distal pulses 2+ and equal bilateral  ABDOMEN: Soft, epigastric tenderness , Nondistended; Bowel sounds present  EXTREMITIES:  No clubbing, cyanosis, or edema  CNS: AAOx3

## 2023-05-30 NOTE — ED ADULT NURSE NOTE - NSFALLHARMRISKINTERV_ED_ALL_ED
Assistance OOB with selected safe patient handling equipment if applicable/Assistance with ambulation/Communicate risk of Fall with Harm to all staff, patient, and family/Monitor gait and stability/Provide visual cue: red socks, yellow wristband, yellow gown, etc/Reinforce activity limits and safety measures with patient and family/Bed in lowest position, wheels locked, appropriate side rails in place/Call bell, personal items and telephone in reach/Instruct patient to call for assistance before getting out of bed/chair/stretcher/Non-slip footwear applied when patient is off stretcher/[place] to call system/Physically safe environment - no spills, clutter or unnecessary equipment/Purposeful Proactive Rounding/Room/bathroom lighting operational, light cord in reach

## 2023-05-30 NOTE — H&P ADULT - HISTORY OF PRESENT ILLNESS
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 Patient was recently dc 10 days ago, was admitted with anemia. Patient was seen outpatient by heme onc Dr. Gaston, who prescribed PO TXA which patient was unable to take due to stomach pain.  Denies fever, chills, hematochezia, hematemesis, chest pain, abdominal pain, n/v/d. 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 Patient was recently dc 10 days ago, was admitted with anemia. Patient was seen outpatient by heme onc Dr. Gaston, who prescribed PO TXA which patient was unable to take due to stomach pain. Denies fever, chills, hematochezia, hematemesis, chest pain, , n/v/d. + back pain, chronic.  Reporting intermittent epigastric abdominal cramps.

## 2023-05-30 NOTE — CONSULT NOTE ADULT - ASSESSMENT
76y/o f pmh Severe AS, HTN, HLD, CKD4, Anemia, Pancytopenia, GIB from Gastric and duodenal AVMs requiring frequent iron and PRBC transfusions (follows with Dr Gaston), was recently dc 10 days ago now presents with worsening dyspnea and black stools x2 days found to have low hgb 4.5.     TTE EF 62% G2DD, severe AS    Impression  # Severe aortic stenosis  # Recurrent GIBs 2/2 AVMs  # Pancytopenia    Plan  - currently hd stable, no overt volume overload on exam  - Transfuse PRBC  - need close hemodynamic monitoring  - will be careful with diuresis to maintain preload  - c/w metoprolol 25mg BID  - structural heart team eval when stable for possible TAVR vs BAV; will follow up  - further ischemic workup when stable  - Heme/onc follow-up

## 2023-05-30 NOTE — ED ADULT TRIAGE NOTE - CHIEF COMPLAINT QUOTE
BIBA from home, patient c/o shortness of breath. Patient receives weekly blood transfusions and reports the shortness of breath comes on when her hemoglobin is low

## 2023-05-30 NOTE — H&P ADULT - ASSESSMENT
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 Patient was recently dc 10 days ago, was admitted with anemia. Patient was seen outpatient by heme onc Dr. Gaston, who prescribed PO TXA which patient was unable to take due to stomach pain.    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 Patient was recently dc 10 days ago, was admitted with anemia. Patient was seen outpatient by heme onc Dr. Gaston, who prescribed PO TXA which patient was unable to take due to stomach pain.    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 Patient was recently dc 10 days ago, was admitted with anemia. Patient was seen outpatient by heme onc Dr. Gaston, who prescribed PO TXA which patient was unable to take due to stomach pain.    Assessment /Plan '  # Acute blood loss anemia, GI bleeding melena  # Hx/o GAVE, small bowel AVM  - CLD, NPO after MN, pt would like to have EGD on Wednesday   - GI consult, hematology consult  - Protonix IVP 40 mg bid  - 2 units PRBC,   - recheck Hgb in AM    # Severe aortic stenosis  - cardiology consult for TAVR  - continue metoprolol 25 bid    #Pancytopenia, hx/o iron deficieny  [facility]   - hem/onc consult  - consider anemia w/u   - check reticulocyte count in am.     # CKD4, at baseline  - avoid nephrotoxic agents  - Cr at baseline,   - consider neprhology consult if Cr worsens..     # peripheral edema  - cont lasix 40 qd    Diet: CLD, NPO after MN.   Activity: OOB, fall precaution  DVT PPX: SCD given anemia low plts.   GI PPX: PPI bid    Code status: FULL CODE

## 2023-05-30 NOTE — ED PROVIDER NOTE - CLINICAL SUMMARY MEDICAL DECISION MAKING FREE TEXT BOX
Patient with history as above presents for evaluation of shortness of breath.  Here patient found to have a hemoglobin of 4.5 with brown stool on rectal exam.  Transfused admitted for further evaluation and treatment.

## 2023-05-30 NOTE — ED PROVIDER NOTE - ATTENDING CONTRIBUTION TO CARE
76-year-old female with history of aortic stenosis chronic GI bleed from GAVE, multiple small bowel AVMs S/P enteroscopy and photocoagulation in March 2023, presents valuation of shortness of breath and dark-colored stool.  Patient was admitted for anemia recently and was transfused.  On exam patient pale appearing no distress S1-S2 clear auscultation soft nontender rectal exam with no mass or melena.  Here patient found to be anemic to 4.5 transfused blood and admitted for further evaluation.  Patient cognition is stable appearing no distress. 76-year-old female with history of aortic stenosis chronic GI bleed from GAVE, multiple small bowel AVMs S/P enteroscopy and photocoagulation in March 2023, presents evaluation of shortness of breath and dark-colored stool.  Patient was admitted for anemia recently and was transfused.  On exam patient pale appearing no distress S1-S2 clear auscultation soft nontender rectal exam with no mass or melena.  Here patient found to be anemic to 4.5 transfused blood and admitted for further evaluation.  Patient condition is stable appearing no distress.

## 2023-05-30 NOTE — H&P ADULT - NSICDXPASTSURGICALHX_GEN_ALL_CORE_FT
PAST SURGICAL HISTORY:  H/O appendicitis     H/O cholecystitis s/p cholecystectomy     PAST SURGICAL HISTORY:  H/O appendicitis     H/O colonoscopy     History of esophagogastroduodenoscopy (EGD)     S/P cholecystectomy

## 2023-05-30 NOTE — ED PROVIDER NOTE - OBJECTIVE STATEMENT
Patient is a 78yo F with a PMHx of HTN, CKD stage 4, Severe AS, Anemia due to iron deficiency in setting of multiple gastric and duodenal AVMs presented to ED with c/o 2d SOB, dark stools. PT with recent admission for low Hb requiring multiple transfusions, dc on 5/20. States "this feels like I have low hemoglobin again." PT denies CP, abd pain. PT otherwise well.

## 2023-05-30 NOTE — H&P ADULT - NSHPLABSRESULTS_GEN_ALL_CORE
4.5    2.17  )-----------( 102      ( 30 May 2023 14:25 )             14.4       05-30    141  |  110  |  69<HH>  ----------------------------<  99  4.7   |  23  |  2.5<H>    Ca    7.9<L>      30 May 2023 14:25    TPro  4.7<L>  /  Alb  2.9<L>  /  TBili  0.7  /  DBili  x   /  AST  14  /  ALT  5   /  AlkPhos  69  05-30        PT/INR - ( 30 May 2023 14:25 )   PT: 12.00 sec;   INR: 1.05 ratio         PTT - ( 30 May 2023 14:25 )  PTT:43.6 sec    Lactate Trend  05-30 @ 14:25 Lactate:0.6       CARDIAC MARKERS ( 30 May 2023 14:25 )  x     / <0.01 ng/mL / x     / x     / x          Xray Chest 1 View-PORTABLE IMMEDIATE (Xray Chest 1 View-PORTABLE IMMEDIATE .) (05.30.23 @ 13:48)   Impression:  No focal pulmonary consolidation

## 2023-05-30 NOTE — ED PROVIDER NOTE - PHYSICAL EXAMINATION
CONSTITUTIONAL: Well-developed; well-nourished; NAD  SKIN: warm, dry, w/o rash  HEAD: NCAT  EYES: PERRLA, EOMI, no conjunctival injection  ENT: No nasal discharge; nl OP without erythema or exudates  NECK: Supple, non-tender  CARD: nl S1, S2; RRR, no MRG, no JVD  RESP: CTAB, normal respiratory effort  ABD: BS+, soft, NTND, no HSM  REctal: chaperoned by dr guerrero, +brown stool no BRBPR or melena  EXT: Normal ROM.  No clubbing, cyanosis or edema  NEURO: Alert, oriented, grossly unremarkable  PSYCH: Cooperative, appropriate

## 2023-05-30 NOTE — ED PROVIDER NOTE - INTERNATIONAL TRAVEL
Warfarin Dosing - Pharmacy Consult Note  Consulting Provider: Dr. Hugo Landis  Indication:  History of  DVT/PE and Atrial Fibrillation  Warfarin Dose prior to admission: 2 mg Mon/Fri and 4 mg all other days   Concurrent anticoagulants/antiplatelets: cilostazol  Significant Drug Interactions:   -Home medications- pravastatin, allopurinol  -ceftriaxone started 11/23  -mirtazapine started 11/23  Recent Labs     11/24/22  0600 11/25/22  0656 11/26/22  0533   INR 3.3 2.9 2.5   HGB  --  10.8* 10.0*   PLT  --  99* 95*   LABALBU 3.6 3.8 3.5     Recent warfarin administrations                     warfarin (COUMADIN) tablet 2 mg (mg) 2 mg Given 11/25/22 1734    warfarin (COUMADIN) tablet 4 mg (mg) 4 mg Given 11/23/22 1841                   Date   INR    Dose  11/22     2.6        4 mg   11/23     2.6        4 mg  11/24     3.3        -0-  11/25     2.9        2 mg  11/26     2.5    Assessment/Plan  (Goal INR: 2 - 3)  Give home dose of 4 mg PO x1 today. Documented improvement in appetite. Active problem list reviewed. INR orders are placed. Chart reviewed for pertinent labs, drug/diet interactions, and past doses. Documentation of patient's clinical condition was reviewed. Pharmacy Dosing:  Pharmacy will continue to follow. No

## 2023-05-31 LAB
ALBUMIN SERPL ELPH-MCNC: 2.9 G/DL — LOW (ref 3.5–5.2)
ALP SERPL-CCNC: 77 U/L — SIGNIFICANT CHANGE UP (ref 30–115)
ALT FLD-CCNC: 6 U/L — SIGNIFICANT CHANGE UP (ref 0–41)
ANION GAP SERPL CALC-SCNC: 8 MMOL/L — SIGNIFICANT CHANGE UP (ref 7–14)
AST SERPL-CCNC: 13 U/L — SIGNIFICANT CHANGE UP (ref 0–41)
BASOPHILS # BLD AUTO: 0 K/UL — SIGNIFICANT CHANGE UP (ref 0–0.2)
BASOPHILS NFR BLD AUTO: 0 % — SIGNIFICANT CHANGE UP (ref 0–1)
BILIRUB SERPL-MCNC: 1.7 MG/DL — HIGH (ref 0.2–1.2)
BUN SERPL-MCNC: 63 MG/DL — CRITICAL HIGH (ref 10–20)
CALCIUM SERPL-MCNC: 7.6 MG/DL — LOW (ref 8.4–10.5)
CHLORIDE SERPL-SCNC: 108 MMOL/L — SIGNIFICANT CHANGE UP (ref 98–110)
CO2 SERPL-SCNC: 24 MMOL/L — SIGNIFICANT CHANGE UP (ref 17–32)
CREAT SERPL-MCNC: 2.6 MG/DL — HIGH (ref 0.7–1.5)
EGFR: 18 ML/MIN/1.73M2 — LOW
EOSINOPHIL # BLD AUTO: 0.24 K/UL — SIGNIFICANT CHANGE UP (ref 0–0.7)
EOSINOPHIL NFR BLD AUTO: 6.3 % — SIGNIFICANT CHANGE UP (ref 0–8)
GLUCOSE SERPL-MCNC: 95 MG/DL — SIGNIFICANT CHANGE UP (ref 70–99)
HCT VFR BLD CALC: 21.2 % — LOW (ref 37–47)
HGB BLD-MCNC: 6.6 G/DL — CRITICAL LOW (ref 12–16)
IMM GRANULOCYTES NFR BLD AUTO: 0.3 % — SIGNIFICANT CHANGE UP (ref 0.1–0.3)
LYMPHOCYTES # BLD AUTO: 0.52 K/UL — LOW (ref 1.2–3.4)
LYMPHOCYTES # BLD AUTO: 13.7 % — LOW (ref 20.5–51.1)
MCHC RBC-ENTMCNC: 29.7 PG — SIGNIFICANT CHANGE UP (ref 27–31)
MCHC RBC-ENTMCNC: 31.1 G/DL — LOW (ref 32–37)
MCV RBC AUTO: 95.5 FL — SIGNIFICANT CHANGE UP (ref 81–99)
MONOCYTES # BLD AUTO: 0.43 K/UL — SIGNIFICANT CHANGE UP (ref 0.1–0.6)
MONOCYTES NFR BLD AUTO: 11.3 % — HIGH (ref 1.7–9.3)
NEUTROPHILS # BLD AUTO: 2.6 K/UL — SIGNIFICANT CHANGE UP (ref 1.4–6.5)
NEUTROPHILS NFR BLD AUTO: 68.4 % — SIGNIFICANT CHANGE UP (ref 42.2–75.2)
NRBC # BLD: 0 /100 WBCS — SIGNIFICANT CHANGE UP (ref 0–0)
PLATELET # BLD AUTO: 113 K/UL — LOW (ref 130–400)
PMV BLD: 10.5 FL — HIGH (ref 7.4–10.4)
POTASSIUM SERPL-MCNC: 4.5 MMOL/L — SIGNIFICANT CHANGE UP (ref 3.5–5)
POTASSIUM SERPL-SCNC: 4.5 MMOL/L — SIGNIFICANT CHANGE UP (ref 3.5–5)
PROT SERPL-MCNC: 4.7 G/DL — LOW (ref 6–8)
RBC # BLD: 2.22 M/UL — LOW (ref 4.2–5.4)
RBC # BLD: 2.22 M/UL — LOW (ref 4.2–5.4)
RBC # FLD: 18.4 % — HIGH (ref 11.5–14.5)
RETICS #: 161.8 K/UL — HIGH (ref 25–125)
RETICS/RBC NFR: 7.3 % — HIGH (ref 0.5–1.5)
SODIUM SERPL-SCNC: 140 MMOL/L — SIGNIFICANT CHANGE UP (ref 135–146)
WBC # BLD: 3.8 K/UL — LOW (ref 4.8–10.8)
WBC # FLD AUTO: 3.8 K/UL — LOW (ref 4.8–10.8)

## 2023-05-31 PROCEDURE — 99223 1ST HOSP IP/OBS HIGH 75: CPT

## 2023-05-31 PROCEDURE — 99222 1ST HOSP IP/OBS MODERATE 55: CPT

## 2023-05-31 PROCEDURE — 99233 SBSQ HOSP IP/OBS HIGH 50: CPT

## 2023-05-31 PROCEDURE — 99497 ADVNCD CARE PLAN 30 MIN: CPT | Mod: 25

## 2023-05-31 RX ORDER — NYSTATIN CREAM 100000 [USP'U]/G
1 CREAM TOPICAL ONCE
Refills: 0 | Status: COMPLETED | OUTPATIENT
Start: 2023-05-31 | End: 2023-06-01

## 2023-05-31 RX ADMIN — Medication 25 MILLIGRAM(S): at 19:54

## 2023-05-31 RX ADMIN — Medication 40 MILLIGRAM(S): at 06:08

## 2023-05-31 RX ADMIN — Medication 25 MILLIGRAM(S): at 17:20

## 2023-05-31 RX ADMIN — Medication 1 MILLIGRAM(S): at 11:10

## 2023-05-31 RX ADMIN — Medication 25 MILLIGRAM(S): at 06:08

## 2023-05-31 RX ADMIN — PANTOPRAZOLE SODIUM 40 MILLIGRAM(S): 20 TABLET, DELAYED RELEASE ORAL at 17:20

## 2023-05-31 RX ADMIN — SENNA PLUS 2 TABLET(S): 8.6 TABLET ORAL at 21:05

## 2023-05-31 RX ADMIN — PANTOPRAZOLE SODIUM 40 MILLIGRAM(S): 20 TABLET, DELAYED RELEASE ORAL at 06:08

## 2023-05-31 NOTE — CONSULT NOTE ADULT - NS ATTEND AMEND GEN_ALL_CORE FT
78y/o f pmh Severe AS, HTN, HLD, CKD4, Anemia, Pancytopenia, GIB from Gastric and duodenal AVMs requiring frequent iron and PRBC transfusions (follows with Dr Gaston), was recently dc 10 days ago now presents with worsening dyspnea and black stools x2 days found to have low hgb 4.5.   Patient with KANG. No angina or near syncope or near syncope. Would transfuse as needed. Gi W/U if needed . Risk intermediate. Patient aware to F/U Dr Hernandez structural heart
I edited the note

## 2023-05-31 NOTE — PHYSICAL THERAPY INITIAL EVALUATION ADULT - SPECIFY REASON(S)
Pt has low hemoglobin levels; will hold PT at this time and follow-up as appropriate to complete PT evaluation. RN aware.

## 2023-05-31 NOTE — CONSULT NOTE ADULT - ASSESSMENT
77yFemale pmh HTN, severe aortic stenosis awaiting TAVR, CKD, avms, chronic anemia presents for symptomatic anemia. 77yFemale pmh HTN, severe aortic stenosis awaiting TAVR, CKD, avms, chronic anemia presents for symptomatic anemia.    EGD Dr. Robles 1/9/2020  Impressions:  prominent brunners gland  duodenitis   Colonoscopy Dr. Robles 10/22/2019  Impressions:  moderate diverticulosis  -grade 2 internal hemorrhoids  -solid stool in some spots throughout the colon    s/p Enteroscopy 4/14/23 Dr. Calvo  Impressions:  -Normal mucosa in the whole esophagus.  -Normal mucosa in the whole stomach.  -The bulbar mucosa was nodular. This could be due to underlying lymphoid  hyperplasia. Biopsies taken. .  -An oozing Dieulafoy lesion was seen in the Third part of the duodenum. Three  endoclips were applied to the Third part of the duodenum for the purpose of  hemostasis. .  Abnormal mucosa in the Jejunum (120 cm from incisors).    Problem 1-Acute on chronic Anemia  rectal exam-brown stool in rectal vault  Rec  -can plan for EGD/Enteroscopy tomorrow, please transfuse hgb to >8  -hematology consult  -Maintain Hemodynamic Stability   -Monitor CBC  -CMP,Optimize Electrolytes  -PT,PTT,INR  -EKG, Chest-Xray   -Transfuse prn to hgb >8  -Two large bore IV lines  - PPI BID  -Monitor Vital Signs  -Monitor Stool For blood, frequency, consistency, melena  -Active Type and Screen  -Iron Studies, Folate, Vitamin B12 levels  77yFemale pmh HTN, severe aortic stenosis awaiting TAVR, CKD, avms, chronic anemia presents for symptomatic anemia.    EGD Dr. Robles 1/9/2020  Impressions:  prominent brunners gland  duodenitis   Colonoscopy Dr. Robles 10/22/2019  Impressions:  moderate diverticulosis  -grade 2 internal hemorrhoids  -solid stool in some spots throughout the colon    s/p Enteroscopy 4/14/23 Dr. Calvo  Impressions:  -Normal mucosa in the whole esophagus.  -Normal mucosa in the whole stomach.  -The bulbar mucosa was nodular. This could be due to underlying lymphoid  hyperplasia. Biopsies taken. .  -An oozing Dieulafoy lesion was seen in the Third part of the duodenum. Three  endoclips were applied to the Third part of the duodenum for the purpose of  hemostasis. .  Abnormal mucosa in the Jejunum (120 cm from incisors).    Problem 1-Acute on chronic Anemia  rectal exam-brown stool in rectal vault  Rec  -can plan for EGD/Enteroscopy tomorrow, please transfuse hgb to >8  -npo aftermidnight  -okay for clear liquids today  -hematology consult  -Maintain Hemodynamic Stability   -Monitor CBC  -CMP,Optimize Electrolytes  -PT,PTT,INR  -EKG, Chest-Xray   -Transfuse prn to hgb >8  -Two large bore IV lines  - PPI BID  -Monitor Vital Signs  -Monitor Stool For blood, frequency, consistency, melena  -Active Type and Screen  -Iron Studies, Folate, Vitamin B12 levels

## 2023-05-31 NOTE — CONSULT NOTE ADULT - SUBJECTIVE AND OBJECTIVE BOX
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 Patient was recently dc 10 days ago, was admitted with anemia. Patient was seen outpatient by heme onc Dr. Gaston, who prescribed PO TXA which patient was unable to take due to stomach pain. Denies fever, chills, hematochezia, hematemesis, chest pain, , n/v/d. + back pain, chronic.  Reporting intermittent epigastric abdominal cramps.  (30 May 2023 16:00)    GI Updates: 77yFemale pmh HTN, severe aortic stenosis awaiting TAVR, CKD, avms, chronic anemia presents for symptomatic anemia. Patient denies nausea, vomiting, hematemesis, melena, blood in stool, diarrhea, constipation, abdominal pain.      PAST MEDICAL & SURGICAL HISTORY:   HTN (hypertension)      Heart murmur      Eczema      Anemia  iron infusions and PRBC transfusions      Aortic stenosis      Chronic kidney disease (CKD)      2019 novel coronavirus disease (COVID-19)  4/2020- REHAB admission      Gastric AVM      H/O appendicitis      S/P cholecystectomy      History of esophagogastroduodenoscopy (EGD)      H/O colonoscopy            Family history:  FAMILY HISTORY:  FH: kidney disease (Father)    FH: breast cancer (Mother)    FH: multiple myeloma (Mother)      No GI cancers in first or second degree relatives    Social History: No smoking. No alcohol. No illegal drug use.    Allergies:   latex (Unknown)  aspirin (Rash; Other)  penicillins (Rash; Urticaria; Hives; Blisters)  EKG leads (Hives)  Intolerances            MEDICATIONS: Home Medications:  folic acid 1 mg oral tablet: 1 tab(s) orally once a day (30 May 2023 16:00)  furosemide 40 mg oral tablet: 1 tab(s) orally once a day (30 May 2023 16:00)  metoprolol tartrate 25 mg oral tablet: 1 tab(s) orally 2 times a day (30 May 2023 16:00)    MEDICATIONS  (STANDING):  folic acid 1 milliGRAM(s) Oral daily  furosemide    Tablet 40 milliGRAM(s) Oral daily  metoprolol tartrate 25 milliGRAM(s) Oral two times a day  pantoprazole  Injectable 40 milliGRAM(s) IV Push every 12 hours  senna 2 Tablet(s) Oral at bedtime    MEDICATIONS  (PRN):  diphenhydrAMINE 25 milliGRAM(s) Oral every 6 hours PRN Rash and/or Itching  hyoscyamine SL 0.125 milliGRAM(s) SubLingual every 8 hours PRN abdominal cramps        REVIEW OF SYSTEMS  General:  No weight loss, fevers, or chills.  Eyes:  No reported pain or visual changes  ENT:  No sore throat or runny nose.  NECK: No stiffness or lymphadenopathy  CV:  No chest pain or palpitations.  Resp:  No shortness of breath, cough, wheezing or hemoptysis  GI:  No abdominal pain, nausea, vomiting, dysphagia, diarrhea or constipation. No rectal bleeding, melena, or hematemesis.  Muscle:  No aches or weakness  Neuro:  No tingling, numbness       VITALS:   T(F): 96.8 (05-30-23 @ 21:53), Max: 96.9 (05-30-23 @ 12:52)  HR: 77 (05-30-23 @ 21:53) (74 - 77)  BP: 153/57 (05-30-23 @ 21:53) (113/51 - 153/57)  RR: 18 (05-30-23 @ 21:53) (18 - 20)  SpO2: 99% (05-30-23 @ 12:52) (99% - 99%)    PHYSICAL EXAM:  GENERAL: AAOx3, no acute distress.  HEAD:  Atraumatic, Normocephalic  EYES: conjunctiva and sclera clear  NECK: Supple, No thyromegaly   CHEST/LUNG: Clear to auscultation bilaterally; No wheeze, rhonchi, or rales  HEART: Regular rate and rhythm; normal S1, S2, No murmurs.  ABDOMEN: Soft, nontender, nondistended; Bowel sounds present  NEUROLOGY: No asterixis or tremor  SKIN: Intact, no jaundice          LABS:  05-30    141  |  110  |  69<HH>  ----------------------------<  99  4.7   |  23  |  2.5<H>    Ca    7.9<L>      30 May 2023 14:25    TPro  4.7<L>  /  Alb  2.9<L>  /  TBili  0.7  /  DBili  x   /  AST  14  /  ALT  5   /  AlkPhos  69  05-30                          4.5    2.17  )-----------( 102      ( 30 May 2023 14:25 )             14.4     LIVER FUNCTIONS - ( 30 May 2023 14:25 )  Alb: 2.9 g/dL / Pro: 4.7 g/dL / ALK PHOS: 69 U/L / ALT: 5 U/L / AST: 14 U/L / GGT: x           PT/INR - ( 30 May 2023 14:25 )   PT: 12.00 sec;   INR: 1.05 ratio         PTT - ( 30 May 2023 14:25 )  PTT:43.6 sec    IMAGING:    < from: CT Abdomen and Pelvis No Cont (04.20.23 @ 21:09) >  ACC: [id number] EXAM:  CT ABDOMEN AND PELVIS   ORDERED BY: SAHRA AQUINO     PROCEDURE DATE:  04/20/2023          INTERPRETATION:  CLINICAL HISTORY / REASON FOR EXAM: Epigastric pain.    TECHNIQUE: Contiguous axial CT images were obtained from the lower chest   to the pubic symphysis without intravenous contrast. Oral contrast was   not administered. Reformatted images in the coronal and sagittal planes   were acquired.    COMPARISON CT: CT abdomen and pelvis from September 6, 2022    OTHER STUDIES USED FOR CORRELATION: None.      FINDINGS:    LOWER CHEST: Partially visualized Port-A-Cath. Respiratory motion,   limiting evaluation for small nodules. Mosaic attenuation of the   visualized bilateral upper and lower lobes. Cardiomegaly.    HEPATOBILIARY: Post cholecystectomy.    SPLEEN: Unremarkable.    PANCREAS: Unremarkable.    ADRENAL GLANDS: Stable left adrenal gland nodule.    KIDNEYS: Bilateral renal cortical atrophy. No evidence of hydronephrosis.   Renal cysts and other subcentimeterhypodensities, too small to further   characterize.    ABDOMINOPELVIC NODES: Multiple scattered prominent mesenteric lymph nodes.    PELVIC ORGANS: Fibroid uterus.    PERITONEUM/MESENTERY/BOWEL: Sigmoid diverticulosis. No bowel obstruction,   ascitesor intraperitoneal free air.    BONES/SOFT TISSUES: Diffuse osteopenia. Degenerative changes of the   thoracolumbar spine. Small fat-containing umbilical hernia. No   retroperitoneal hematoma.    VASCULAR: Aorta is normal in caliber.      IMPRESSION:    No CT evidence of acute intra-abdominal pathology.    --- End of Report ---            JANIYA JASSO MD; Attending Radiologist  This document has been electronically signed. Apr 20 2023  9:22PM    < end of copied text >      
HPI:  Patient is a 76 year old female with hx of severe aortic stenosis, transfusion dependent anemia likely due to CKD and 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 Patient was recently dc 10 days ago, was admitted with anemia. Patient was seen outpatient by heme onc Dr. Gaston, who prescribed PO TXA which patient was unable to take due to stomach pain.  Denies fever, chills, hematochezia, hematemesis, chest pain, abdominal pain, n/v/d. + back pain, chronic (30 May 2023 16:00)        Cardiology Update: 78y/o f pmh Severe AS, HTN, HLD, CKD4, Anemia, Pancytopenia, GIB from Gastric and duodenal AVMs requiring frequent iron and PRBC transfusions (follows with Dr Gaston), was recently dc 10 days ago now presents with worsening dyspnea and black stools x2 days found to have low hgb 4.5. Denies, CP, palpitations, n/v/d, fever/chills. Pt was previously referred to Structural Heart team, awaiting further AS workup    Cardiologist: Dr Paula        PAST MEDICAL & SURGICAL HISTORY  HTN (hypertension)    Heart murmur    Eczema    Anemia  iron infusions and PRBC transfusions    Aortic stenosis    Chronic kidney disease (CKD)    2019 novel coronavirus disease (COVID-19)  4/2020- REHAB admission    H/O appendicitis    H/O cholecystitis  s/p cholecystectomy        FAMILY HISTORY:  FAMILY HISTORY:  FH: kidney disease (Father)        SOCIAL HISTORY:  []smoker  []Alcohol  []Drug    ALLERGIES:  latex (Unknown)  aspirin (Rash; Other)  penicillins (Rash; Urticaria; Hives; Blisters)  EKG leads (Hives)      MEDICATIONS:  MEDICATIONS  (STANDING):  folic acid 1 milliGRAM(s) Oral daily  metoprolol tartrate 25 milliGRAM(s) Oral two times a day  pantoprazole  Injectable 40 milliGRAM(s) IV Push every 12 hours  senna 2 Tablet(s) Oral at bedtime    MEDICATIONS  (PRN):  diphenhydrAMINE 25 milliGRAM(s) Oral every 6 hours PRN Rash and/or Itching      HOME MEDICATIONS:  Home Medications:  folic acid 1 mg oral tablet: 1 tab(s) orally once a day (30 May 2023 16:00)  furosemide 40 mg oral tablet: 1 tab(s) orally once a day (30 May 2023 16:00)  metoprolol tartrate 25 mg oral tablet: 1 tab(s) orally 2 times a day (30 May 2023 16:00)      VITALS:   T(F): 96.9 (05-30 @ 12:52), Max: 96.9 (05-30 @ 12:52)  HR: 74 (05-30 @ 12:52) (74 - 74)  BP: 113/51 (05-30 @ 12:52) (113/51 - 113/51)  BP(mean): --  RR: 20 (05-30 @ 12:52) (20 - 20)  SpO2: 99% (05-30 @ 12:52) (99% - 99%)    I&O's Summary      REVIEW OF SYSTEMS:  CONSTITUTIONAL: No weakness, fevers or chills  EYES: No visual changes  ENT: No vertigo or throat pain   NECK: No pain or stiffness  RESPIRATORY: No cough, wheezing, hemoptysis; No shortness of breath  CARDIOVASCULAR: No chest pain or palpitations  GASTROINTESTINAL: No abdominal or epigastric pain. No nausea, vomiting, or hematemesis; No diarrhea or constipation. No melena or hematochezia.  GENITOURINARY: No dysuria, frequency or hematuria  NEUROLOGICAL: No numbness or weakness  SKIN: No itching, no rashes  MSK: no    PHYSICAL EXAM:  NEURO: patient is awake , alert and oriented  GEN: Not in acute distress  NECK: no thyroid enlargement, no JVD  LUNGS: Clear to auscultation bilaterally   CARDIOVASCULAR: S1/S2 present, RRR , no murmurs or rubs, no carotid bruits,  + PP bilaterally  ABD: Soft, non-tender, non-distended, +BS  EXT: No NIKOLAI  SKIN: Intact    LABS:                        4.5    2.17  )-----------( 102      ( 30 May 2023 14:25 )             14.4     05-30    141  |  110  |  69<HH>  ----------------------------<  99  4.7   |  23  |  2.5<H>    Ca    7.9<L>      30 May 2023 14:25    TPro  4.7<L>  /  Alb  2.9<L>  /  TBili  0.7  /  DBili  x   /  AST  14  /  ALT  5   /  AlkPhos  69  05-30    PT/INR - ( 30 May 2023 14:25 )   PT: 12.00 sec;   INR: 1.05 ratio         PTT - ( 30 May 2023 14:25 )  PTT:43.6 sec  Troponin T, Serum: <0.01 ng/mL (05-30-23 @ 14:25)  Lactate, Blood: 0.6 mmol/L *L* (05-30-23 @ 14:25)    CARDIAC MARKERS ( 30 May 2023 14:25 )  x     / <0.01 ng/mL / x     / x     / x            Troponin trend:      04-01 Chol 113 LDL -- HDL 51 Trig 75      RADIOLOGY:  < from: TTE Echo Complete w/o Contrast w/ Doppler (03.22.23 @ 14:29) >  Summary:   1. Normal global left ventricular systolic function.   2. LV Ejection Fraction by Brown's Method with a biplane EF of 62 %.   3. Mildly increased LV wall thickness.   4. Spectral Doppler shows pseudonormal pattern of left ventricular   myocardial filling (Grade II diastolic dysfunction).   5. Mildly enlarged left atrium.   6. Normal right atrial size.   7. Mild mitral valve regurgitation.   8. Mild thickening of the anterior and posterior mitral valve leaflets.   9. Mild tricuspid regurgitation.  10. Severe aortic stenosis, peak/mean pressure gradient 120/68 mmHg, SHILA   0.9 cm^2.    PHYSICIAN INTERPRETATION:  Left Ventricle: The left ventricular internal cavity size is normal. Left   ventricular wall thickness is mildly increased. Global LV systolic   function was normal. Normal segmental left ventricular systolic function.   Spectral Doppler shows pseudonormal pattern of left ventricular   myocardial filling (Grade II diastolic dysfunction).  Right Ventricle: Normal right ventricular size and function.  Left Atrium: Mildly enlarged left atrium.  Right Atrium: Normal right atrial size. RA Area is 15.01 cm² cm2.  Pericardium: There is no evidence of pericardial effusion.  Mitral Valve: Mild thickening of the anterior and posterior mitral valve   leaflets. Mild mitral valve regurgitation is seen.  Tricuspid Valve: The tricuspid valve is normal in structure. Mild   tricuspid regurgitation is visualized.  Aortic Valve: Trivial aortic valve regurgitation is seen. Severe aortic   stenosis, peak/mean pressure gradient 120/68 mmHg, SHILA 0.9 cm^2.  Pulmonic Valve: Structurally normal pulmonic valve, with normal leaflet   excursion. No indication of pulmonic valve regurgitation.  Aorta: The aortic root and ascending aorta are structurally normal, with   no evidence of dilitation.  Pulmonary Artery: The pulmonary artery is of normal size and origin.    < end of copied text >      ECG:    TELEMETRY EVENTS:

## 2023-05-31 NOTE — PATIENT PROFILE ADULT - FALL HARM RISK - RISK INTERVENTIONS

## 2023-06-01 LAB
ALBUMIN SERPL ELPH-MCNC: 3.1 G/DL — LOW (ref 3.5–5.2)
ALP SERPL-CCNC: 86 U/L — SIGNIFICANT CHANGE UP (ref 30–115)
ALT FLD-CCNC: 6 U/L — SIGNIFICANT CHANGE UP (ref 0–41)
ANION GAP SERPL CALC-SCNC: 8 MMOL/L — SIGNIFICANT CHANGE UP (ref 7–14)
AST SERPL-CCNC: 14 U/L — SIGNIFICANT CHANGE UP (ref 0–41)
BASOPHILS # BLD AUTO: 0.01 K/UL — SIGNIFICANT CHANGE UP (ref 0–0.2)
BASOPHILS NFR BLD AUTO: 0.3 % — SIGNIFICANT CHANGE UP (ref 0–1)
BILIRUB SERPL-MCNC: 2.1 MG/DL — HIGH (ref 0.2–1.2)
BUN SERPL-MCNC: 58 MG/DL — HIGH (ref 10–20)
CALCIUM SERPL-MCNC: 7.9 MG/DL — LOW (ref 8.4–10.5)
CHLORIDE SERPL-SCNC: 107 MMOL/L — SIGNIFICANT CHANGE UP (ref 98–110)
CO2 SERPL-SCNC: 24 MMOL/L — SIGNIFICANT CHANGE UP (ref 17–32)
CREAT SERPL-MCNC: 2.8 MG/DL — HIGH (ref 0.7–1.5)
EGFR: 17 ML/MIN/1.73M2 — LOW
EOSINOPHIL # BLD AUTO: 0.28 K/UL — SIGNIFICANT CHANGE UP (ref 0–0.7)
EOSINOPHIL NFR BLD AUTO: 8.1 % — HIGH (ref 0–8)
GLUCOSE SERPL-MCNC: 84 MG/DL — SIGNIFICANT CHANGE UP (ref 70–99)
HCT VFR BLD CALC: 26.5 % — LOW (ref 37–47)
HGB BLD-MCNC: 8.5 G/DL — LOW (ref 12–16)
IMM GRANULOCYTES NFR BLD AUTO: 0.3 % — SIGNIFICANT CHANGE UP (ref 0.1–0.3)
LYMPHOCYTES # BLD AUTO: 0.37 K/UL — LOW (ref 1.2–3.4)
LYMPHOCYTES # BLD AUTO: 10.7 % — LOW (ref 20.5–51.1)
MCHC RBC-ENTMCNC: 29.2 PG — SIGNIFICANT CHANGE UP (ref 27–31)
MCHC RBC-ENTMCNC: 32.1 G/DL — SIGNIFICANT CHANGE UP (ref 32–37)
MCV RBC AUTO: 91.1 FL — SIGNIFICANT CHANGE UP (ref 81–99)
MONOCYTES # BLD AUTO: 0.33 K/UL — SIGNIFICANT CHANGE UP (ref 0.1–0.6)
MONOCYTES NFR BLD AUTO: 9.6 % — HIGH (ref 1.7–9.3)
NEUTROPHILS # BLD AUTO: 2.45 K/UL — SIGNIFICANT CHANGE UP (ref 1.4–6.5)
NEUTROPHILS NFR BLD AUTO: 71 % — SIGNIFICANT CHANGE UP (ref 42.2–75.2)
NRBC # BLD: 0 /100 WBCS — SIGNIFICANT CHANGE UP (ref 0–0)
PLATELET # BLD AUTO: 111 K/UL — LOW (ref 130–400)
PMV BLD: 10.6 FL — HIGH (ref 7.4–10.4)
POTASSIUM SERPL-MCNC: 4.3 MMOL/L — SIGNIFICANT CHANGE UP (ref 3.5–5)
POTASSIUM SERPL-SCNC: 4.3 MMOL/L — SIGNIFICANT CHANGE UP (ref 3.5–5)
PROT SERPL-MCNC: 4.9 G/DL — LOW (ref 6–8)
RBC # BLD: 2.91 M/UL — LOW (ref 4.2–5.4)
RBC # FLD: 17.7 % — HIGH (ref 11.5–14.5)
SODIUM SERPL-SCNC: 139 MMOL/L — SIGNIFICANT CHANGE UP (ref 135–146)
WBC # BLD: 3.45 K/UL — LOW (ref 4.8–10.8)
WBC # FLD AUTO: 3.45 K/UL — LOW (ref 4.8–10.8)

## 2023-06-01 PROCEDURE — 99232 SBSQ HOSP IP/OBS MODERATE 35: CPT

## 2023-06-01 RX ADMIN — Medication 25 MILLIGRAM(S): at 17:18

## 2023-06-01 RX ADMIN — Medication 25 MILLIGRAM(S): at 05:48

## 2023-06-01 RX ADMIN — Medication 25 MILLIGRAM(S): at 21:02

## 2023-06-01 RX ADMIN — PANTOPRAZOLE SODIUM 40 MILLIGRAM(S): 20 TABLET, DELAYED RELEASE ORAL at 07:47

## 2023-06-01 RX ADMIN — Medication 1 MILLIGRAM(S): at 13:28

## 2023-06-01 RX ADMIN — NYSTATIN CREAM 1 APPLICATION(S): 100000 CREAM TOPICAL at 15:35

## 2023-06-01 RX ADMIN — PANTOPRAZOLE SODIUM 40 MILLIGRAM(S): 20 TABLET, DELAYED RELEASE ORAL at 17:17

## 2023-06-01 RX ADMIN — Medication 40 MILLIGRAM(S): at 05:48

## 2023-06-01 NOTE — PHYSICAL THERAPY INITIAL EVALUATION ADULT - PERTINENT HX OF CURRENT PROBLEM, REHAB EVAL
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 Patient was recently dc 10 days ago, was admitted with anemia. Patient was seen outpatient by heme onc Dr. Gaston, who prescribed PO TXA which patient was unable to take due to stomach pain.  # Acute blood loss anemia, GI bleeding melena

## 2023-06-01 NOTE — PROGRESS NOTE ADULT - NS ATTEND AMEND GEN_ALL_CORE FT
Patient admittted with anemia and dark stools. Well known to GI. For enteroscopy tomorrow Case d/w Advanced service/

## 2023-06-01 NOTE — PROGRESS NOTE ADULT - SUBJECTIVE AND OBJECTIVE BOX
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 Patient was recently dc 10 days ago, was admitted with anemia. Patient was seen outpatient by heme onc Dr. Gaston, who prescribed PO TXA which patient was unable to take due to stomach pain.    Today:  Seen at bedside, no new complaints.                REVIEW OF SYSTEMS:  No new complaints      MEDICATIONS  (STANDING):  folic acid 1 milliGRAM(s) Oral daily  furosemide    Tablet 40 milliGRAM(s) Oral daily  metoprolol tartrate 25 milliGRAM(s) Oral two times a day  nystatin Ointment 1 Application(s) Topical once  pantoprazole  Injectable 40 milliGRAM(s) IV Push every 12 hours  senna 2 Tablet(s) Oral at bedtime    MEDICATIONS  (PRN):  diphenhydrAMINE 25 milliGRAM(s) Oral every 6 hours PRN Rash and/or Itching  hyoscyamine SL 0.125 milliGRAM(s) SubLingual every 8 hours PRN abdominal cramps      Allergies  latex (Unknown)  aspirin (Rash; Other)  penicillins (Rash; Urticaria; Hives; Blisters)  EKG leads (Hives)        FAMILY HISTORY:  FH: kidney disease (Father)  FH: breast cancer (Mother)  FH: multiple myeloma (Mother)        Vital Signs Last 24 Hrs  T(C): 35.7 (31 May 2023 14:11), Max: 36 (30 May 2023 21:53)  T(F): 96.3 (31 May 2023 14:11), Max: 96.8 (30 May 2023 21:53)  HR: 65 (31 May 2023 14:11) (65 - 77)  BP: 119/59 (31 May 2023 14:11) (119/59 - 153/57)  RR: 18 (31 May 2023 14:11) (18 - 18)        PHYSICAL EXAM:  GENERAL: NAD, well-groomed, well-developed  HEAD:  Atraumatic, Normocephalic  EYES: EOMI, PERRLA, conjunctiva and sclera clear  ENMT: No tonsillar erythema, exudates, or enlargement; Moist mucous membranes, Good dentition, No lesions  NECK: Supple, No JVD, Normal thyroid  NERVOUS SYSTEM:  Alert & Oriented X3, Good concentration  CHEST/LUNG: CTA bilaterally; No rales, rhonchi, wheezing, or rubs  HEART: Regular rate and rhythm; No murmurs, rubs, or gallops  ABDOMEN: Soft, Nontender, Nondistended; Bowel sounds present  EXTREMITIES:  2+ Peripheral Pulses, No clubbing, cyanosis, or edema  SKIN: No rashes or lesions      LABS:                        6.6    3.80  )-----------( 113      ( 31 May 2023 10:30 )             21.2     05-31    140  |  108  |  63<HH>  ----------------------------<  95  4.5   |  24  |  2.6<H>    Ca    7.6<L>      31 May 2023 10:30    TPro  4.7<L>  /  Alb  2.9<L>  /  TBili  1.7<H>  /  DBili  x   /  AST  13  /  ALT  6   /  AlkPhos  77  05-31    PT/INR - ( 30 May 2023 14:25 )   PT: 12.00 sec;   INR: 1.05 ratio         PTT - ( 30 May 2023 14:25 )  PTT:43.6 sec    
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 Patient was recently dc 10 days ago, was admitted with anemia. Patient was seen outpatient by heme onc Dr. Gaston, who prescribed PO TXA which patient was unable to take due to stomach pain.    Today:  Seen at bedside, no new complaints.      REVIEW OF SYSTEMS:  No new complaints      MEDICATIONS  (STANDING):  folic acid 1 milliGRAM(s) Oral daily  furosemide    Tablet 40 milliGRAM(s) Oral daily  metoprolol tartrate 25 milliGRAM(s) Oral two times a day  nystatin Ointment 1 Application(s) Topical once  pantoprazole  Injectable 40 milliGRAM(s) IV Push every 12 hours  senna 2 Tablet(s) Oral at bedtime    MEDICATIONS  (PRN):  diphenhydrAMINE 25 milliGRAM(s) Oral every 6 hours PRN Rash and/or Itching  hyoscyamine SL 0.125 milliGRAM(s) SubLingual every 8 hours PRN abdominal cramps      Allergies  latex (Unknown)  aspirin (Rash; Other)  penicillins (Rash; Urticaria; Hives; Blisters)  EKG leads (Hives)          FAMILY HISTORY:  FH: kidney disease (Father)  FH: breast cancer (Mother)  FH: multiple myeloma (Mother)        Vital Signs Last 24 Hrs  T(C): 36.5 (01 Jun 2023 13:54), Max: 36.5 (31 May 2023 20:44)  T(F): 97.7 (01 Jun 2023 13:54), Max: 97.7 (31 May 2023 20:44)  HR: 65 (01 Jun 2023 13:54) (62 - 65)  BP: 141/52 (01 Jun 2023 13:54) (118/56 - 141/52)  RR: 18 (01 Jun 2023 13:54) (18 - 18)  SpO2: 96% (01 Jun 2023 04:44) (96% - 96%)        PHYSICAL EXAM:  GENERAL: NAD, well-groomed, well-developed  HEAD:  Atraumatic, Normocephalic  EYES: EOMI, PERRLA, conjunctiva and sclera clear  ENMT: No tonsillar erythema, exudates, or enlargement; Moist mucous membranes, Good dentition, No lesions  NECK: Supple, No JVD, Normal thyroid  NERVOUS SYSTEM:  Alert & Oriented X3, Good concentration  CHEST/LUNG: CTA bilaterally; No rales, rhonchi, wheezing, or rubs  HEART: Regular rate and rhythm; No murmurs, rubs, or gallops  ABDOMEN: Soft, Nontender, Nondistended; Bowel sounds present  EXTREMITIES:  2+ Peripheral Pulses, No clubbing, cyanosis, or edema  SKIN: No rashes or lesions      LABS:                        8.5    3.45  )-----------( 111      ( 01 Jun 2023 10:54 )             26.5     06-01    139  |  107  |  58<H>  ----------------------------<  84  4.3   |  24  |  2.8<H>    Ca    7.9<L>      01 Jun 2023 10:54    TPro  4.9<L>  /  Alb  3.1<L>  /  TBili  2.1<H>  /  DBili  x   /  AST  14  /  ALT  6   /  AlkPhos  86  06-01        
77yFemale  Being followed for anemia possible dark stools  Interval history: Patient denies nausea, vomiting, hematemesis, melena, blood in stool, diarrhea, constipation, abdominal pain. Patient tolerating diet.      PAST MEDICAL & SURGICAL HISTORY:  HTN (hypertension)      Heart murmur      Eczema      Anemia  iron infusions and PRBC transfusions      Aortic stenosis      Chronic kidney disease (CKD)      2019 novel coronavirus disease (COVID-19)  4/2020- REHAB admission      Gastric AVM      H/O appendicitis      S/P cholecystectomy      History of esophagogastroduodenoscopy (EGD)      H/O colonoscopy                Social History: No smoking. No alcohol. No illegal drug use.            MEDICATIONS  (STANDING):  folic acid 1 milliGRAM(s) Oral daily  furosemide    Tablet 40 milliGRAM(s) Oral daily  metoprolol tartrate 25 milliGRAM(s) Oral two times a day  nystatin Ointment 1 Application(s) Topical once  pantoprazole  Injectable 40 milliGRAM(s) IV Push every 12 hours  senna 2 Tablet(s) Oral at bedtime    MEDICATIONS  (PRN):  diphenhydrAMINE 25 milliGRAM(s) Oral every 6 hours PRN Rash and/or Itching  hyoscyamine SL 0.125 milliGRAM(s) SubLingual every 8 hours PRN abdominal cramps      Allergies:   latex (Unknown)  aspirin (Rash; Other)  penicillins (Rash; Urticaria; Hives; Blisters)  EKG leads (Hives)          REVIEW OF SYSTEMS:  General:  No weight loss, fevers, or chills.  Eyes:  No reported pain or visual changes  ENT:  No sore throat or runny nose.  NECK: No stiffness   CV:  No chest pain or palpitations.  Resp:  No shortness of breath, cough  GI:  No abdominal pain, nausea, vomiting, dysphagia, diarrhea or constipation. No rectal bleeding, melena, or hematemesis.  Neuro:  No tingling, numbness           VITAL SIGNS:   T(F): 97.7 (06-01-23 @ 13:54), Max: 97.7 (05-31-23 @ 20:44)  HR: 65 (06-01-23 @ 13:54) (62 - 65)  BP: 141/52 (06-01-23 @ 13:54) (118/56 - 141/52)  RR: 18 (06-01-23 @ 13:54) (18 - 18)  SpO2: 96% (06-01-23 @ 04:44) (96% - 96%)    PHYSICAL EXAM:  GENERAL: AAOx3, no acute distress.  HEAD:  Atraumatic, Normocephalic  EYES: conjunctiva and sclera clear  NECK: Supple, no JVD or thyromegaly  CHEST/LUNG: Clear to auscultation bilaterally; No wheeze, rhonchi, or rales  HEART: Regular rate and rhythm; normal S1, S2, No murmurs.  ABDOMEN: Soft, nontender, nondistended; Bowel sounds present  NEUROLOGY: No asterixis or tremor.   SKIN: Intact, no jaundice            LABS:                        8.5    3.45  )-----------( 111      ( 01 Jun 2023 10:54 )             26.5     06-01    139  |  107  |  58<H>  ----------------------------<  84  4.3   |  24  |  2.8<H>    Ca    7.9<L>      01 Jun 2023 10:54    TPro  4.9<L>  /  Alb  3.1<L>  /  TBili  2.1<H>  /  DBili  x   /  AST  14  /  ALT  6   /  AlkPhos  86  06-01    LIVER FUNCTIONS - ( 01 Jun 2023 10:54 )  Alb: 3.1 g/dL / Pro: 4.9 g/dL / ALK PHOS: 86 U/L / ALT: 6 U/L / AST: 14 U/L / GGT: x               IMAGING:    < from: CT Abdomen and Pelvis No Cont (04.20.23 @ 21:09) >    ACC: [id number] EXAM:  CT ABDOMEN AND PELVIS   ORDERED BY: SAHRA AQUINO     PROCEDURE DATE:  04/20/2023          INTERPRETATION:  CLINICAL HISTORY / REASON FOR EXAM: Epigastric pain.    TECHNIQUE: Contiguous axial CT images were obtained from the lower chest   to the pubic symphysis without intravenous contrast. Oral contrast was   not administered. Reformatted images in the coronal and sagittal planes   were acquired.    COMPARISON CT: CT abdomen and pelvis from September 6, 2022    OTHER STUDIES USED FOR CORRELATION: None.      FINDINGS:    LOWER CHEST: Partially visualized Port-A-Cath. Respiratory motion,   limiting evaluation for small nodules. Mosaic attenuation of the   visualized bilateral upper and lower lobes. Cardiomegaly.    HEPATOBILIARY: Post cholecystectomy.    SPLEEN: Unremarkable.    PANCREAS: Unremarkable.    ADRENAL GLANDS: Stable left adrenal gland nodule.    KIDNEYS: Bilateral renal cortical atrophy. No evidence of hydronephrosis.   Renal cysts and other subcentimeterhypodensities, too small to further   characterize.    ABDOMINOPELVIC NODES: Multiple scattered prominent mesenteric lymph nodes.    PELVIC ORGANS: Fibroid uterus.    PERITONEUM/MESENTERY/BOWEL: Sigmoid diverticulosis. No bowel obstruction,   ascitesor intraperitoneal free air.    BONES/SOFT TISSUES: Diffuse osteopenia. Degenerative changes of the   thoracolumbar spine. Small fat-containing umbilical hernia. No   retroperitoneal hematoma.    VASCULAR: Aorta is normal in caliber.      IMPRESSION:    No CT evidence of acute intra-abdominal pathology.    --- End of Report ---            JAINYA JASSO MD; Attending Radiologist  This document has been electronically signed. Apr 20 2023  9:22PM    < end of copied text >        
Short Note: The patient is well-known to the Hematology service. Known history of anemia secondary to GI blood loss from AVMs, on periodic iron infusions, followed by Dr. Gaston. Patient seen by GI and work up progressing.  At this time, no new recommendations from Hematology except close follow up with Dr. TONI Gaston after discharge.

## 2023-06-01 NOTE — PROGRESS NOTE ADULT - ASSESSMENT
77yFemale pmh HTN, severe aortic stenosis awaiting TAVR, CKD, avms, chronic anemia presents for symptomatic anemia.    EGD Dr. Robles 1/9/2020  Impressions:  prominent brunners gland  duodenitis   Colonoscopy Dr. Robles 10/22/2019  Impressions:  moderate diverticulosis  -grade 2 internal hemorrhoids  -solid stool in some spots throughout the colon    s/p Enteroscopy 4/14/23 Dr. Calvo  Impressions:  -Normal mucosa in the whole esophagus.  -Normal mucosa in the whole stomach.  -The bulbar mucosa was nodular. This could be due to underlying lymphoid  hyperplasia. Biopsies taken. .  -An oozing Dieulafoy lesion was seen in the Third part of the duodenum. Three  endoclips were applied to the Third part of the duodenum for the purpose of  hemostasis. .  Abnormal mucosa in the Jejunum (120 cm from incisors).    Problem 1-Acute on chronic Anemia  rectal exam-brown stool in rectal vault  Rec  -on schedule for EGD/Enteroscopy tomorrow at [facility] given concern for general GI team to reach site on piror enteroscopy, will have advanced GI do case tomorrow, please transfuse hgb to >8  -npo aftermidnight  -transport arranged for patient to be at [facility] by 9A.M. roundtrip   -hematology consult  -Maintain Hemodynamic Stability   -Monitor CBC  -CMP,Optimize Electrolytes  -PT,PTT,INR  -EKG, Chest-Xray   -Transfuse prn to hgb >8  -Two large bore IV lines  - PPI BID  -Monitor Vital Signs  -Monitor Stool For blood, frequency, consistency, melena  -Active Type and Screen  -Iron Studies, Folate, Vitamin B12 levels 
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 Patient was recently dc 10 days ago, was admitted with anemia. Patient was seen outpatient by heme onc Dr. Gaston, who prescribed PO TXA which patient was unable to take due to stomach pain.    Assessment /Plan '  # Acute blood loss anemia, GI bleeding melena  # Hx/o GAVE, small bowel AVM  - NPO after MN, EGD tomorrow with advanced GI at [facility]  - GI consult appreciated  - Protonix IVP 40 mg bid  - 2 units PRBC, transfused 2 more as hgb 6.6, improved to 8.5    # Severe aortic stenosis  - cardiology consult for TAVR  - continue metoprolol 25 bid  - after GI work up follow with cardio for structural heart team    #Pancytopenia, hx/o iron deficieny  - outpatient on venofer.   - hem/onc consult-called by service and was told consult is not necessary, patient will follow up as outpatient    # CKD4, at baseline  - avoid nephrotoxic agents  - Cr at baseline,     # peripheral edema  - cont lasix 40 qd    Diet: NPO after MN.   Activity: OOB, fall precaution  DVT PPX: SCD given anemia low plts.   GI PPX: PPI bid    Code status: FULL CODE    Dispo:  Ac[place], [place] tomorrow then needs to be evaluated by cardio
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 Patient was recently dc 10 days ago, was admitted with anemia. Patient was seen outpatient by heme onc Dr. Gaston, who prescribed PO TXA which patient was unable to take due to stomach pain.    Assessment /Plan '  # Acute blood loss anemia, GI bleeding melena  # Hx/o GAVE, small bowel AVM  - CLD, NPO after MN, EGD tomorrow   - GI consult appreciated  - Protonix IVP 40 mg bid  - 2 units PRBC, transfuse 2 more as hgb 6.6    # Severe aortic stenosis  - cardiology consult for TAVR  - continue metoprolol 25 bid    #Pancytopenia, hx/o iron deficieny  - outpatient on venofer.   - hem/onc consult-called by service and was told consult is not necessary, patient will follow up as outpatient    # CKD4, at baseline  - avoid nephrotoxic agents  - Cr at baseline,   - consider neprhology consult if Cr worsens..     # peripheral edema  - cont lasix 40 qd    Diet: CLD, NPO after MN.   Activity: OOB, fall precaution  DVT PPX: SCD given anemia low plts.   GI PPX: PPI bid    Code status: FULL CODE

## 2023-06-01 NOTE — PHYSICAL THERAPY INITIAL EVALUATION ADULT - GENERAL OBSERVATIONS, REHAB EVAL
10;10-10;35 pt was seen for PT IE at bed side, pt is agreeable, chart thoroughly reviewed, RN Julian is aware.  Pt received and left semi dimas in bed, in no apparent distress, +hep lock, +primafit, +chest port R, +call bell within reach, bed side table at reach. Pt is obese

## 2023-06-01 NOTE — PHYSICAL THERAPY INITIAL EVALUATION ADULT - ADDITIONAL COMMENTS
pt lives with her son in the same house in a downstairs apartment, with 3 steps to the FD and one level inside.  Son lives upstairs.  Pt had RW and is using it for distance amb and occasionally in the house for balance

## 2023-06-02 ENCOUNTER — TRANSCRIPTION ENCOUNTER (OUTPATIENT)
Age: 78
End: 2023-06-02

## 2023-06-02 ENCOUNTER — APPOINTMENT (OUTPATIENT)
Dept: INFUSION THERAPY | Facility: CLINIC | Age: 78
End: 2023-06-02

## 2023-06-02 VITALS
RESPIRATION RATE: 18 BRPM | HEART RATE: 70 BPM | OXYGEN SATURATION: 99 % | DIASTOLIC BLOOD PRESSURE: 58 MMHG | SYSTOLIC BLOOD PRESSURE: 112 MMHG

## 2023-06-02 PROCEDURE — 44366 SMALL BOWEL ENDOSCOPY: CPT

## 2023-06-02 PROCEDURE — 99239 HOSP IP/OBS DSCHRG MGMT >30: CPT

## 2023-06-02 RX ADMIN — PANTOPRAZOLE SODIUM 40 MILLIGRAM(S): 20 TABLET, DELAYED RELEASE ORAL at 05:13

## 2023-06-02 RX ADMIN — Medication 300 UNIT(S): at 17:54

## 2023-06-02 RX ADMIN — Medication 40 MILLIGRAM(S): at 05:11

## 2023-06-02 NOTE — DISCHARGE NOTE PROVIDER - CARE PROVIDER_API CALL
Liu Gaston  Medical Oncology  [address] [address]  Phone: [phone]  Fax: [phone]  Follow Up Time: 1 week    Gildardo Bill  Internal Medicine  [address] [place] [id number]  Phone: [phone]  Fax: [phone]  Follow Up Time: 1 week

## 2023-06-02 NOTE — DISCHARGE NOTE PROVIDER - NSDCCAREPROVSEEN_GEN_ALL_CORE_FT
Leo Diggs Dontae, Leo Singh, Melvin Hinojosa, Monico Barone, Hazel Diggs, Leo Cartagena, Alon Gutierrez, Johnathan Lal, Jose Antonio Thompson, Abdifatah Avery, Hassan H Dhulipalla, Bill Fierro, [facility]

## 2023-06-02 NOTE — DISCHARGE NOTE NURSING/CASE MANAGEMENT/SOCIAL WORK - PATIENT PORTAL LINK FT
You can access the FollowMyHealth Patient Portal offered by [facility] by registering at the following website: [email or web address]. By joining [employer]’s FollowMyHealth portal, you will also be able to view your health information using other applications (apps) compatible with our system.

## 2023-06-02 NOTE — PRE-ANESTHESIA EVALUATION ADULT - NSANTHPMHFT_GEN_ALL_CORE
Chart reviewed, Med/ GI and hematology evaluation seen, pt interviewed and examined. Labs, EKG   seen. PMH: as above, H/O severe AS, CKD, anemia.

## 2023-06-02 NOTE — DISCHARGE NOTE PROVIDER - NSDCFUADDINST_GEN_ALL_CORE_FT
Please return to the ED with any worsening black stool, lightheadedness, dizziness, chest pain or shortness of breath

## 2023-06-02 NOTE — DISCHARGE NOTE PROVIDER - HOSPITAL COURSE
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 Patient was recently dc 10 days ago, after being admitted for anemia. Patient was seen outpatient by heme onc Dr. Gaston, who prescribed PO TXA which patient was unable to take due to stomach pain. On arrival to ER, Hgb noted to be 4.5. She was admitted to medicine for further management. She was transfused 2U PRBC and Hgb improved to 6.6. Transfused an additional 2U with improvement to 8.5. GI consulted and taken for EGD/Eneteroscopy on 6/2 which showed multiple bleeding AVM's treated with APC and endoclip. Transferred back to floor and tolerating diet well with no further melena. Hemodynamically stable for discharge to follow up with outpatient providers      Hospital Course 5/30/23-6/2/23  # Acute blood loss anemia, GI bleeding melena  # Hx/o GAVE, small bowel AVM  - 2 units PRBC, transfused 2 more as hgb 6.6, improved to 8.5  - GI consult appreciated  - Placed on Protonix IVP 40 mg bid  - Taken for EGD/enteroscopy, report as follows: normal esophagus, normal stomach; brunner gland hyperplasia in the duodenal bulb; the previously placed clips in the second portion of the duodenum were seen with an adjacent actively oozing AVM that was ablated using APC, followed by placement of 2 endoclips; a previously placed clip was seen in the proximal jejunum; The scope was advanced to the proximal jejunum to around 150 cm from the incisors and 4 AVMs were seen throughout the proximal jejunum. 3 AVMs were  treated with APC and placement of 1 endoclip (total 3). One AVM was treated with APC only.  - After procedure patient hemodynamically stable with no further melena.   - Tolerating diet and wished to be discharged home   - States she will follow up with her outpatient providers   - Pathology from EGD pending at time of discharge   - Cont iron supplementation and resume A/C on 6/3/23    # Severe aortic stenosis  - continue metoprolol 25 bid  - advised to follow up with cardio structural heart team as an outpatient as patient did not wish to remain admitted    #Pancytopenia, hx/o iron deficieny  - outpatient on venofer.   - hem/onc consult-called by service and was told consult is not necessary, patient will follow up as outpatient    # CKD4, at baseline  - avoid nephrotoxic agents  - Cr at baseline,     # peripheral edema  - cont lasix 40 qd

## 2023-06-02 NOTE — DISCHARGE NOTE PROVIDER - NSDCMRMEDTOKEN_GEN_ALL_CORE_FT
folic acid 1 mg oral tablet: 1 tab(s) orally once a day  furosemide 40 mg oral tablet: 1 tab(s) orally once a day  metoprolol tartrate 25 mg oral tablet: 1 tab(s) orally 2 times a day  pantoprazole 40 mg oral delayed release tablet: 1 tab(s) orally 2 times a day  senna leaf extract oral tablet: 2 tab(s) orally once a day (at bedtime)

## 2023-06-02 NOTE — DISCHARGE NOTE PROVIDER - PROVIDER TOKENS
PROVIDER:[TOKEN:[[id number]],FOLLOWUP:[1 week]],PROVIDER:[TOKEN:[[id number]],FOLLOWUP:[1 week]]

## 2023-06-02 NOTE — DISCHARGE NOTE PROVIDER - ATTENDING DISCHARGE PHYSICAL EXAMINATION:
PHYSICAL EXAM:  GENERAL: NAD, well-groomed, well-developed  HEAD:  Atraumatic, Normocephalic  EYES: EOMI, PERRLA, conjunctiva and sclera clear  ENMT: No tonsillar erythema, exudates, or enlargement; Moist mucous membranes, Good dentition, No lesions  NECK: Supple, No JVD, Normal thyroid  NERVOUS SYSTEM:  Alert & Oriented X3, Good concentration  CHEST/LUNG: CTA bilaterally; No rales, rhonchi, wheezing, or rubs  HEART: Regular rate and rhythm; No murmurs, rubs, or gallops  ABDOMEN: Soft, Nontender, Nondistended; Bowel sounds present  EXTREMITIES:  2+ Peripheral Pulses, No clubbing, cyanosis, or edema  SKIN: No rashes or lesions

## 2023-06-02 NOTE — DISCHARGE NOTE PROVIDER - NSDCCPCAREPLAN_GEN_ALL_CORE_FT
PRINCIPAL DISCHARGE DIAGNOSIS  Diagnosis: Anemia  Assessment and Plan of Treatment: - You were admitted with anemia from your chronic GI bleeding. You received a total of 4units of blood. Your Hgb improved. GI was consulted and you were taken for an EGD and your AVM's were treated and clipped. You remained stable after procedure for discharge home. Please follow up with your providers

## 2023-06-06 ENCOUNTER — NON-APPOINTMENT (OUTPATIENT)
Age: 78
End: 2023-06-06

## 2023-06-07 ENCOUNTER — RX RENEWAL (OUTPATIENT)
Age: 78
End: 2023-06-07

## 2023-06-08 DIAGNOSIS — D64.9 ANEMIA, UNSPECIFIED: ICD-10-CM

## 2023-06-08 DIAGNOSIS — Z79.899 OTHER LONG TERM (CURRENT) DRUG THERAPY: ICD-10-CM

## 2023-06-08 DIAGNOSIS — Z88.6 ALLERGY STATUS TO ANALGESIC AGENT: ICD-10-CM

## 2023-06-08 DIAGNOSIS — K31.811 ANGIODYSPLASIA OF STOMACH AND DUODENUM WITH BLEEDING: ICD-10-CM

## 2023-06-08 DIAGNOSIS — Z91.040 LATEX ALLERGY STATUS: ICD-10-CM

## 2023-06-08 DIAGNOSIS — N18.4 CHRONIC KIDNEY DISEASE, STAGE 4 (SEVERE): ICD-10-CM

## 2023-06-08 DIAGNOSIS — D61.818 OTHER PANCYTOPENIA: ICD-10-CM

## 2023-06-08 DIAGNOSIS — D62 ACUTE POSTHEMORRHAGIC ANEMIA: ICD-10-CM

## 2023-06-08 DIAGNOSIS — I12.9 HYPERTENSIVE CHRONIC KIDNEY DISEASE WITH STAGE 1 THROUGH STAGE 4 CHRONIC KIDNEY DISEASE, OR UNSPECIFIED CHRONIC KIDNEY DISEASE: ICD-10-CM

## 2023-06-08 DIAGNOSIS — D50.9 IRON DEFICIENCY ANEMIA, UNSPECIFIED: ICD-10-CM

## 2023-06-08 DIAGNOSIS — D63.1 ANEMIA IN CHRONIC KIDNEY DISEASE: ICD-10-CM

## 2023-06-08 DIAGNOSIS — Z88.0 ALLERGY STATUS TO PENICILLIN: ICD-10-CM

## 2023-06-08 DIAGNOSIS — R60.0 LOCALIZED EDEMA: ICD-10-CM

## 2023-06-08 DIAGNOSIS — I35.0 NONRHEUMATIC AORTIC (VALVE) STENOSIS: ICD-10-CM

## 2023-06-08 DIAGNOSIS — Z86.16 PERSONAL HISTORY OF COVID-19: ICD-10-CM

## 2023-06-08 PROBLEM — K31.819 ANGIODYSPLASIA OF STOMACH AND DUODENUM WITHOUT BLEEDING: Chronic | Status: ACTIVE | Noted: 2023-05-30

## 2023-06-09 ENCOUNTER — OUTPATIENT (OUTPATIENT)
Dept: OUTPATIENT SERVICES | Facility: HOSPITAL | Age: 78
LOS: 1 days | End: 2023-06-09
Payer: MEDICARE

## 2023-06-09 ENCOUNTER — APPOINTMENT (OUTPATIENT)
Dept: INFUSION THERAPY | Facility: CLINIC | Age: 78
End: 2023-06-09

## 2023-06-09 VITALS
HEART RATE: 101 BPM | SYSTOLIC BLOOD PRESSURE: 151 MMHG | RESPIRATION RATE: 16 BRPM | DIASTOLIC BLOOD PRESSURE: 68 MMHG | TEMPERATURE: 97.5 F

## 2023-06-09 DIAGNOSIS — Z87.19 PERSONAL HISTORY OF OTHER DISEASES OF THE DIGESTIVE SYSTEM: Chronic | ICD-10-CM

## 2023-06-09 DIAGNOSIS — Z98.890 OTHER SPECIFIED POSTPROCEDURAL STATES: Chronic | ICD-10-CM

## 2023-06-09 DIAGNOSIS — Z90.49 ACQUIRED ABSENCE OF OTHER SPECIFIED PARTS OF DIGESTIVE TRACT: Chronic | ICD-10-CM

## 2023-06-09 DIAGNOSIS — D64.9 ANEMIA, UNSPECIFIED: ICD-10-CM

## 2023-06-09 PROCEDURE — 86902 BLOOD TYPE ANTIGEN DONOR EA: CPT

## 2023-06-09 PROCEDURE — 96375 TX/PRO/DX INJ NEW DRUG ADDON: CPT

## 2023-06-09 PROCEDURE — 96365 THER/PROPH/DIAG IV INF INIT: CPT

## 2023-06-09 PROCEDURE — 36430 TRANSFUSION BLD/BLD COMPNT: CPT

## 2023-06-09 PROCEDURE — P9040: CPT

## 2023-06-09 PROCEDURE — 96372 THER/PROPH/DIAG INJ SC/IM: CPT

## 2023-06-09 PROCEDURE — 36415 COLL VENOUS BLD VENIPUNCTURE: CPT

## 2023-06-09 PROCEDURE — 86922 COMPATIBILITY TEST ANTIGLOB: CPT

## 2023-06-09 RX ORDER — ERYTHROPOIETIN 10000 [IU]/ML
30000 INJECTION, SOLUTION INTRAVENOUS; SUBCUTANEOUS ONCE
Refills: 0 | Status: COMPLETED | OUTPATIENT
Start: 2023-06-09 | End: 2023-06-09

## 2023-06-09 RX ORDER — CETIRIZINE HYDROCHLORIDE 5 MG/1
5 TABLET ORAL
Qty: 7 | Refills: 0 | Status: ACTIVE | COMMUNITY
Start: 2023-06-09 | End: 1900-01-01

## 2023-06-09 RX ORDER — CETIRIZINE HYDROCHLORIDE 10 MG/1
5 TABLET ORAL ONCE
Refills: 0 | Status: COMPLETED | OUTPATIENT
Start: 2023-06-09 | End: 2023-06-09

## 2023-06-09 RX ORDER — FUROSEMIDE 40 MG
20 TABLET ORAL ONCE
Refills: 0 | Status: COMPLETED | OUTPATIENT
Start: 2023-06-09 | End: 2023-06-09

## 2023-06-09 RX ORDER — IRON SUCROSE 20 MG/ML
200 INJECTION, SOLUTION INTRAVENOUS ONCE
Refills: 0 | Status: COMPLETED | OUTPATIENT
Start: 2023-06-09 | End: 2023-06-09

## 2023-06-09 RX ADMIN — CETIRIZINE HYDROCHLORIDE 5 MILLIGRAM(S): 10 TABLET ORAL at 12:25

## 2023-06-09 RX ADMIN — IRON SUCROSE 110 MILLIGRAM(S): 20 INJECTION, SOLUTION INTRAVENOUS at 12:15

## 2023-06-09 RX ADMIN — Medication 20 MILLIGRAM(S): at 12:25

## 2023-06-09 RX ADMIN — IRON SUCROSE 200 MILLIGRAM(S): 20 INJECTION, SOLUTION INTRAVENOUS at 12:50

## 2023-06-09 RX ADMIN — ERYTHROPOIETIN 30000 UNIT(S): 10000 INJECTION, SOLUTION INTRAVENOUS; SUBCUTANEOUS at 12:16

## 2023-06-10 DIAGNOSIS — D64.9 ANEMIA, UNSPECIFIED: ICD-10-CM

## 2023-06-16 ENCOUNTER — APPOINTMENT (OUTPATIENT)
Dept: INFUSION THERAPY | Facility: CLINIC | Age: 78
End: 2023-06-16
Payer: MEDICARE

## 2023-06-16 ENCOUNTER — OUTPATIENT (OUTPATIENT)
Dept: OUTPATIENT SERVICES | Facility: HOSPITAL | Age: 78
LOS: 1 days | End: 2023-06-16
Payer: MEDICARE

## 2023-06-16 ENCOUNTER — APPOINTMENT (OUTPATIENT)
Dept: HEMATOLOGY ONCOLOGY | Facility: CLINIC | Age: 78
End: 2023-06-16
Payer: MEDICARE

## 2023-06-16 DIAGNOSIS — D64.9 ANEMIA, UNSPECIFIED: ICD-10-CM

## 2023-06-16 DIAGNOSIS — Z90.49 ACQUIRED ABSENCE OF OTHER SPECIFIED PARTS OF DIGESTIVE TRACT: Chronic | ICD-10-CM

## 2023-06-16 DIAGNOSIS — Z98.890 OTHER SPECIFIED POSTPROCEDURAL STATES: Chronic | ICD-10-CM

## 2023-06-16 DIAGNOSIS — Z87.19 PERSONAL HISTORY OF OTHER DISEASES OF THE DIGESTIVE SYSTEM: Chronic | ICD-10-CM

## 2023-06-16 PROCEDURE — 96372 THER/PROPH/DIAG INJ SC/IM: CPT

## 2023-06-16 PROCEDURE — P9040: CPT

## 2023-06-16 PROCEDURE — 96375 TX/PRO/DX INJ NEW DRUG ADDON: CPT

## 2023-06-16 PROCEDURE — 82728 ASSAY OF FERRITIN: CPT

## 2023-06-16 PROCEDURE — 36430 TRANSFUSION BLD/BLD COMPNT: CPT

## 2023-06-16 PROCEDURE — 36415 COLL VENOUS BLD VENIPUNCTURE: CPT

## 2023-06-16 PROCEDURE — 99213 OFFICE O/P EST LOW 20 MIN: CPT

## 2023-06-16 PROCEDURE — 96365 THER/PROPH/DIAG IV INF INIT: CPT

## 2023-06-16 RX ORDER — IRON SUCROSE 20 MG/ML
200 INJECTION, SOLUTION INTRAVENOUS ONCE
Refills: 0 | Status: COMPLETED | OUTPATIENT
Start: 2023-06-16 | End: 2023-06-16

## 2023-06-16 RX ORDER — FUROSEMIDE 40 MG
20 TABLET ORAL ONCE
Refills: 0 | Status: COMPLETED | OUTPATIENT
Start: 2023-06-16 | End: 2023-06-16

## 2023-06-16 RX ORDER — ERYTHROPOIETIN 10000 [IU]/ML
30000 INJECTION, SOLUTION INTRAVENOUS; SUBCUTANEOUS ONCE
Refills: 0 | Status: COMPLETED | OUTPATIENT
Start: 2023-06-16 | End: 2023-06-16

## 2023-06-16 RX ADMIN — Medication 20 MILLIGRAM(S): at 13:50

## 2023-06-16 RX ADMIN — IRON SUCROSE 200 MILLIGRAM(S): 20 INJECTION, SOLUTION INTRAVENOUS at 14:23

## 2023-06-16 RX ADMIN — ERYTHROPOIETIN 30000 UNIT(S): 10000 INJECTION, SOLUTION INTRAVENOUS; SUBCUTANEOUS at 12:34

## 2023-06-16 RX ADMIN — IRON SUCROSE 110 MILLIGRAM(S): 20 INJECTION, SOLUTION INTRAVENOUS at 13:53

## 2023-06-16 NOTE — ASSESSMENT
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 )\par peripheral edema . Worsening kidney function .\par \par did not tolerate Tranexamic acid . \par CBC reviewed, Hgb : 7.8 bleeding likely diminished.\par requesting transfusion , going out of town for the weekend.\par \par Plan: transfuse one unit , venofer , lasix 20 .

## 2023-06-16 NOTE — HISTORY OF PRESENT ILLNESS
[de-identified] : bruce is a 74 year old white female with hypertension, chronic kidney disease, morbidly obese presents today with normocytic anemia. \par Her most recent CBC 04/01/2020 shows WBC -5.66, HB of 6.7, MCV -86.3, RDW - 17.3, platelet count- 196. Her Hb was normal until 08/2019. In October 2019 she noticed black tarry stools and was feeling tired. She went to ER and her Hb was 5.3. She was given 3 units of PRBC. Her iron studies at that showed ferritin of 8 and percent saturation of 5. She had EGD and colonoscopy which did not reveal any bleeding lesions. Following that event as per patient she was not given iron (?not sure why). She was readmitted to hospital in 01/2020 for SOB on exertion and her HB was noted to 6.0 again. She was given 2 units and her iron studies were consistent with DARRIN. B12 and folate were normal. Immunofixation showed weak IgG lambda migrating para protein. Free light chain ratio 2.1. Normal calcium. \par EGD and VCE was done. EGD revealed gastritis and VCE showed bleeding in small bowel. \par She was started on oral iron and she took for about three months. \par Her latest ferritin done in feb 2020 was 24 and percent saturation was 72% and her hb improved to 8.8. She also has push enteroscopy in 02/2020 and no bleeding lesions were found. Was recommended to have repeat colonoscopy and double balloon enteroscopy if she bleeds again.\par She went for blood work again on 04/01/2020 as she was feeling weak and having SOB and her hb dropped to 6.7. She was told by PMD to start on PROCRIT 10,000 units M-W-F. She was told her iron levels are good and she can stop iron. \par \par Today she feels very tired and her SOB is worse. She denies any melena or BRBPR in last few days. She does have anal fissure and hemorrhoids and bleeds when she is constipated. She denies hematuria or post menopausal bleeding. Denies weight loss. Does not take any NSAIDs or aspirin. She has not followed up with nephrology before.\par Family history is significant for breast cancer in her mother. She is a former smoker stopped 20 years ago.\par She is uptodate with mammogram and Pap smear. \par  [de-identified] : 09/17/2020 Patient returns for follow up , she feels better after venofer X 4 and on EPO 10,000 weekly , Hb is up to 10 . \par \par 10/15/2020 Patient returns for follow up 2 weeks after last dose of EPO , today's Hb is 10.7 . she offers no new complaints . \par \par 04/08/2021 Patient returns for follow up for anemia on EPO and iron replacement . Hb is 10.8 . she complains of mild left arm pain after she started to self check her BP . ahe meds were recently adjusted by her PMD . \par \par 08/31/2021 patient returns for follow up , she offers no new complaints , her Hgb is slowly trending down after last transfusion and venofer X1 for ferritin : 35 . \par \par 11/18/2021 patient returns for follow up complaining of weakness and dyspnea on minimal exertion , still with lower extremity edema R > L . Hgb is 7.1 2 weeks after transfusion and despite venofer and EPO . She denies melena or hematochezia . Of note she had suspected small bowel bleeding on enteroscopy or capsule endoscopy and was intolerant to push enteroscopy ( hypotension ? ) \par 2/14/22- Patient is here for follow up visit for management of DARRIN. Hgb is 6.7  3 days only after last transfusion , she reports several episodes of bleeding presumably from hemorrhoids and is using topical medications , Ferritin levels remain low despite venofer . She complains of weakness, bilateral leg edema . no chest pain or SOB . \par \par 12/2/21: patient returns for follow up for DARIRN secondary  obscure GI bleeding  and CKD. \par She is feeling better today. We will continue  Venofer infusion weekly X 3 and Procrit.\par Close monitor CBC on weekly basis with possible blood transfusion if required. \par \par 5/19/2022 Patient returns for follow up , she lost her  last week to lung cancer . He Hgb is still 7.2  two weeks after transfusion and despite weekly venofer . Ferritin is trending down . She is noted with weight loss and complains of abdominal cramps and pain radiating from epigastric area and RUQ , radiating to the back .\par Of note CT scan from 1/2022 showed prominent adenopathy involving mesenteric and retroperitoneal lymph nodes. \par \par \par 8/8/2022 Patient returns for chronic iron deficiency anemia , she is requiring transfusion every 2 weeks . today's Hgb is 6.9 however she denies hematochezia , increased weakness , dizziness , chest pain or palpitations . She continues with mild RUQ pain radiating to the back. \par \par \par 4/13/2023 patient returns for follow after recent admission for melena , she underwent push enteroscopy 	A few small bleeding diffuse angioectasias were seen in\par the antrum. The lesions were distributed in a watermelon-stomach pattern. Hemostasis was performed successfully at the antrum. 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 APC applied to bleeding sites with excellent hemostasis.   Multiple small AVMs were noted in duodenal bulb and\par second part of duodenum. , No blood was noted in jejunum. 2 small non-bleeding\par AVMs were noted in proximal jejunum. \par \par She feels well today , no melena , no weakness or SOB . Edema improved on lasix 40 mg daily . \par  \par \par 5/12/2023 Patient returns for follow up one week after transfusion of 2 units with recurrence of severe weakness, exertional dyspnea . Hgb is down to 5.5 ,She insists no change in stool color .\par ferritin is still < 100 despite weekly venofer . She is also on EPO for severe CKD , she was seen recently by renal and recommended blood and urine tests . \par \par 6/16/2023 Patient returns for follow up one week after last transfusion , Hgb is 7.8 and unusual for her , bleeding has probably subsided or diminished after the latest enteroscopy , multiple bleeding\par AVM's were treated with APC and endoclip. \par She complains mainly of mild dry cough , mild peripheral edema ( R > L ) \par \par \par

## 2023-06-16 NOTE — ED ADULT NURSE NOTE - ED COMFORT CARE
Patient informed Mohs Histo Method Verbiage: Each section was then chromacoded and processed in the Mohs lab using the Mohs protocol and submitted for frozen section.

## 2023-06-16 NOTE — PHYSICAL EXAM
[Obese] : obese [Normal] : clear to auscultation bilaterally, no dullness, no wheezing [de-identified] : pale in no acute distress.  [de-identified] : grade 3/6 systolic murmur  [de-identified] : lower extremity edema ( R > L ) 1 to 2 plus .

## 2023-06-17 LAB — FERRITIN SERPL-MCNC: 100 NG/ML

## 2023-06-19 ENCOUNTER — NON-APPOINTMENT (OUTPATIENT)
Age: 78
End: 2023-06-19

## 2023-06-19 ENCOUNTER — APPOINTMENT (OUTPATIENT)
Dept: GASTROENTEROLOGY | Facility: CLINIC | Age: 78
End: 2023-06-19
Payer: MEDICARE

## 2023-06-19 DIAGNOSIS — D64.9 ANEMIA, UNSPECIFIED: ICD-10-CM

## 2023-06-19 PROCEDURE — 99443: CPT

## 2023-06-19 RX ORDER — SODIUM PICOSULFATE, MAGNESIUM OXIDE, AND ANHYDROUS CITRIC ACID 12; 3.5; 1 G/175ML; G/175ML; MG/175ML
10-3.5-12 MG-GM LIQUID ORAL
Qty: 2 | Refills: 0 | Status: ACTIVE | COMMUNITY
Start: 2023-06-19 | End: 1900-01-01

## 2023-06-19 RX ORDER — SODIUM PICOSULFATE, MAGNESIUM OXIDE, AND ANHYDROUS CITRIC ACID 10; 3.5; 12 MG/160ML; G/160ML; G/160ML
10-3.5-12 MG-GM LIQUID ORAL
Qty: 1 | Refills: 0 | Status: DISCONTINUED | COMMUNITY
Start: 2023-06-19 | End: 2023-06-19

## 2023-06-19 NOTE — HISTORY OF PRESENT ILLNESS
[Home] : at home, [unfilled] , at the time of the visit. [Medical Office: ([facility])___] : at the medical office located in  [Verbal consent obtained from patient] : the patient, [unfilled] [FreeTextEntry4] : Coby Wadsworth  [FreeTextEntry1] : 10/2019 [de-identified] : 10/14/2022

## 2023-06-19 NOTE — REVIEW OF SYSTEMS
[Fever] : no fever [Chills] : no chills [Feeling Poorly] : not feeling poorly [Feeling Tired] : feeling tired [Recent Weight Gain (___ Lbs)] : recent [unfilled] ~Ulb weight gain [Recent Weight Loss (___ Lbs)] : no recent weight loss [As Noted in HPI] : as noted in HPI [Abdominal Pain] : abdominal pain [Vomiting] : no vomiting [Constipation] : constipation [Diarrhea] : no diarrhea [Swollen Glands] : swollen glands [Negative] : Heme/Lymph

## 2023-06-19 NOTE — ASSESSMENT
[FreeTextEntry1] : SP Enteroscopy for melena with treatment of multiple AVMs\par Sp a visit in the past after referral for anemia and melena in January. back then the decision was to perform an endoscopy and colonoscopy.\par 2 enteroscopies were performed since then, no colonosocop was done. Since her last enteroscopy, no melena. and her Hgb is at 7.7.\par She is following with hematology with close monitoring.\par Awaiting decision on mitral valve surgery.\par \par \par Plan:\par COlonosocopy with clenpiq\par VCE reserved for overt bleeding (not the case at the moment)

## 2023-06-20 ENCOUNTER — NON-APPOINTMENT (OUTPATIENT)
Age: 78
End: 2023-06-20

## 2023-06-23 ENCOUNTER — APPOINTMENT (OUTPATIENT)
Dept: INFUSION THERAPY | Facility: CLINIC | Age: 78
End: 2023-06-23

## 2023-06-23 ENCOUNTER — OUTPATIENT (OUTPATIENT)
Dept: OUTPATIENT SERVICES | Facility: HOSPITAL | Age: 78
LOS: 1 days | End: 2023-06-23
Payer: MEDICARE

## 2023-06-23 DIAGNOSIS — Z98.890 OTHER SPECIFIED POSTPROCEDURAL STATES: Chronic | ICD-10-CM

## 2023-06-23 DIAGNOSIS — D64.9 ANEMIA, UNSPECIFIED: ICD-10-CM

## 2023-06-23 DIAGNOSIS — Z87.19 PERSONAL HISTORY OF OTHER DISEASES OF THE DIGESTIVE SYSTEM: Chronic | ICD-10-CM

## 2023-06-23 DIAGNOSIS — Z90.49 ACQUIRED ABSENCE OF OTHER SPECIFIED PARTS OF DIGESTIVE TRACT: Chronic | ICD-10-CM

## 2023-06-23 PROCEDURE — 96365 THER/PROPH/DIAG IV INF INIT: CPT

## 2023-06-23 PROCEDURE — 96372 THER/PROPH/DIAG INJ SC/IM: CPT

## 2023-06-23 RX ORDER — ERYTHROPOIETIN 10000 [IU]/ML
30000 INJECTION, SOLUTION INTRAVENOUS; SUBCUTANEOUS ONCE
Refills: 0 | Status: COMPLETED | OUTPATIENT
Start: 2023-06-23 | End: 2023-06-23

## 2023-06-23 RX ORDER — IRON SUCROSE 20 MG/ML
200 INJECTION, SOLUTION INTRAVENOUS ONCE
Refills: 0 | Status: COMPLETED | OUTPATIENT
Start: 2023-06-23 | End: 2023-06-23

## 2023-06-23 RX ADMIN — IRON SUCROSE 110 MILLIGRAM(S): 20 INJECTION, SOLUTION INTRAVENOUS at 12:25

## 2023-06-23 RX ADMIN — ERYTHROPOIETIN 30000 UNIT(S): 10000 INJECTION, SOLUTION INTRAVENOUS; SUBCUTANEOUS at 12:25

## 2023-06-26 ENCOUNTER — NON-APPOINTMENT (OUTPATIENT)
Age: 78
End: 2023-06-26

## 2023-06-29 ENCOUNTER — APPOINTMENT (OUTPATIENT)
Dept: CARDIOLOGY | Facility: CLINIC | Age: 78
End: 2023-06-29
Payer: MEDICARE

## 2023-06-29 ENCOUNTER — APPOINTMENT (OUTPATIENT)
Dept: CARDIOTHORACIC SURGERY | Facility: CLINIC | Age: 78
End: 2023-06-29

## 2023-06-29 VITALS
BODY MASS INDEX: 35.47 KG/M2 | WEIGHT: 226 LBS | OXYGEN SATURATION: 98 % | DIASTOLIC BLOOD PRESSURE: 75 MMHG | HEIGHT: 67 IN | SYSTOLIC BLOOD PRESSURE: 128 MMHG | RESPIRATION RATE: 14 BRPM | HEART RATE: 64 BPM | TEMPERATURE: 97.7 F

## 2023-06-29 PROCEDURE — 99214 OFFICE O/P EST MOD 30 MIN: CPT

## 2023-06-29 PROCEDURE — 99204 OFFICE O/P NEW MOD 45 MIN: CPT

## 2023-06-29 NOTE — HISTORY OF PRESENT ILLNESS
[FreeTextEntry1] : Ms. LATRICIA ARREAGA 77 year F, former smoker, here  today for evaluation of their aortic stenosis. 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  Symptoms include SOB.  All questions and concerns were addressed with the patient. Pre-op planning was discussed with the patient, including dental clearance. Patient was educated on the risks and alternatives to surgery. STS was calculated and discussed with the pt, as well of need for MCOT post procedure and risk for PPM.  \par \par NYHA class: II\par CCS class: I\par Pt lives home - no aide \par Former smoker quit 20 years ago\par Retired  \par \par Their healthcare team includes the following\par PMD: Peloro\par Cardio: Munir Paula \par Nepho: Dr. Peng Camejo \par GI: El Douaihy \par Hemonc: [facility]i \par \par 5 Meter walk\par 1. \par 2. \par 3. \par \par 6 minute walk: \par \par Frailty: \par Right\par Left\par \par

## 2023-06-29 NOTE — ASSESSMENT
[FreeTextEntry1] : Ms. LATRICIA ARREAGA 77 year F, here  today for evaluation of their aortic stenosis.  All questions and concerns were addressed with the patient. Pre-op planning was discussed with the patient, including dental clearance. Patient was educated on the risks and alternatives to surgery. STS was calculated and discussed with the pt, as well of need for MCOT post procedure and risk for PPM.  \par \par \par Echo reviewed \par Pt has history of anemia receiving transfusions/iron infusions every week and does blood work weekly - will need recs from hemonc prior to TAVR and CATH\par It was discussed that pt will be receiving blood thinners during TAVR as well as after to be discussed with Dr. Gaston and Dr. Sean woods  \par Pt has mediport R CW for infusions\par Pt is pending a colonoscopy with her GI -will need prior to TAVR \par Will also need recs from nepho - cr 2.3-2.6 prior to TAVR and CATH, discussed possibility of HD after TAVR\par \par Colosocopy plnned for Augut\par Will RTO after for further disucssion   \par

## 2023-06-29 NOTE — DATA REVIEWED
[FreeTextEntry1] : ECHO TTE: \par \par Summary:\par  1. Normal global left ventricular systolic function.\par  2. LV Ejection Fraction by Brown's Method with a biplane EF of 62 %.\par  3. Mildly increased LV wall thickness.\par  4. Spectral Doppler shows pseudonormal pattern of left ventricular \par myocardial filling (Grade II diastolic dysfunction).\par  5. Mildly enlarged left atrium.\par  6. Normal right atrial size.\par  7. Mild mitral valve regurgitation.\par  8. Mild thickening of the anterior and posterior mitral valve leaflets.\par  9. Mild tricuspid regurgitation.\par 10. Severe aortic stenosis, peak/mean pressure gradient 120/68 mmHg, SHILA \par 0.9 cm S 2.\par \par PHYSICIAN INTERPRETATION:\par Left Ventricle: The left ventricular internal cavity size is normal. Left \par ventricular wall thickness is mildly increased. Global LV systolic \par function was normal. Normal segmental left ventricular systolic function. \par Spectral Doppler shows pseudonormal pattern of left ventricular \par myocardial filling (Grade II diastolic dysfunction).\par Right Ventricle: Normal right ventricular size and function.\par Left Atrium: Mildly enlarged left atrium.\par Right Atrium: Normal right atrial size. RA Area is 15.01 cm² cm2.\par Pericardium: There is no evidence of pericardial effusion.\par Mitral Valve: Mild thickening of the anterior and posterior mitral valve \par leaflets. Mild mitral valve regurgitation is seen.\par Tricuspid Valve: The tricuspid valve is normal in structure. Mild \par tricuspid regurgitation is visualized.\par Aortic Valve: Trivial aortic valve regurgitation is seen. Severe aortic \par stenosis, peak/mean pressure gradient 120/68 mmHg, SHILA 0.9 cm S 2.\par Pulmonic Valve: Structurally normal pulmonic valve, with normal leaflet \par excursion. No indication of pulmonic valve regurgitation.\par Aorta: The aortic root and ascending aorta are structurally normal, with \par no evidence of dilitation.\par Pulmonary Artery: The pulmonary artery is of normal size and origin.\par \par \par 2D AND M-MODE MEASUREMENTS (normal ranges within parentheses):\par Left Ventricle:                  Normal   Aorta/Left Atrium:             \par Normal\par IVSd (2D):              1.27 cm (0.7-1.1) AoV Cusp Separation: 0.80 cm \par (1.5-2.6)\par LVPWd (2D):             1.00 cm (0.7-1.1) Left Atrium (Mmode): 3.70 cm \par (1.9-4.0)\par LVIDd (2D):             4.73 cm (3.4-5.7)\par LVIDs (2D):             3.08 cm\par LV FS (2D):             34.9 %   (>25%)\par Relative Wall Thickness  0.42    (<0.42)\par \par SPECTRAL DOPPLER ANALYSIS:\par LV DIASTOLIC FUNCTION:\par MV Peak E: 1.22 m/s Decel Time: 277 msec\par MV Peak A: 1.18 m/s\par E/A Ratio: 1.03\par \par Aortic Valve:\par AoV VMax:    5.47 m/s   AoV Area, Vmax: 0.85 cm² Vmax Indx: 0.44 cm²/m²\par AoV VTI:     1.21 m     AoV Area, VTI:  0.90 cm² VTI Indx:  0.46 cm²/m²\par AoV Pk Grad: 119.7 mmHg\par AoV Mn Grad: 68.0 mmHg\par \par LVOT Vmax: 1.54 m/s\par LVOT VTI:  0.36 m\par LVOT Diam: 1.95 cm\par \par Aortic Insufficiency:\par AI Half-time:  518 msec\par AI Decel Rate: 1.87 m/s²\par \par Mitral Valve:\par MV P1/2 Time: 80.38 msec\par MV Area, PHT: 2.74 cm²\par \par Tricuspid Valve and PA/RV Systolic Pressure: TR Max Velocity: 2.65 m/s RA \par Pressure: 3 mmHg RVSP/PASP: 31.1 mmHg\par \par Pulmonic Valve:\par PV Max Velocity: 1.69 m/s PV Max P.4 mmHg PV Mean PG:

## 2023-06-30 ENCOUNTER — OUTPATIENT (OUTPATIENT)
Dept: OUTPATIENT SERVICES | Facility: HOSPITAL | Age: 78
LOS: 1 days | End: 2023-06-30
Payer: MEDICARE

## 2023-06-30 ENCOUNTER — APPOINTMENT (OUTPATIENT)
Dept: INFUSION THERAPY | Facility: CLINIC | Age: 78
End: 2023-06-30

## 2023-06-30 VITALS
HEIGHT: 67 IN | OXYGEN SATURATION: 98 % | RESPIRATION RATE: 14 BRPM | HEART RATE: 64 BPM | WEIGHT: 226 LBS | SYSTOLIC BLOOD PRESSURE: 128 MMHG | DIASTOLIC BLOOD PRESSURE: 75 MMHG | BODY MASS INDEX: 35.47 KG/M2

## 2023-06-30 DIAGNOSIS — D64.9 ANEMIA, UNSPECIFIED: ICD-10-CM

## 2023-06-30 DIAGNOSIS — Z98.890 OTHER SPECIFIED POSTPROCEDURAL STATES: Chronic | ICD-10-CM

## 2023-06-30 DIAGNOSIS — Z87.19 PERSONAL HISTORY OF OTHER DISEASES OF THE DIGESTIVE SYSTEM: Chronic | ICD-10-CM

## 2023-06-30 DIAGNOSIS — Z90.49 ACQUIRED ABSENCE OF OTHER SPECIFIED PARTS OF DIGESTIVE TRACT: Chronic | ICD-10-CM

## 2023-06-30 PROCEDURE — P9040: CPT

## 2023-06-30 PROCEDURE — 86902 BLOOD TYPE ANTIGEN DONOR EA: CPT

## 2023-06-30 PROCEDURE — 86922 COMPATIBILITY TEST ANTIGLOB: CPT

## 2023-06-30 PROCEDURE — 96365 THER/PROPH/DIAG IV INF INIT: CPT

## 2023-06-30 PROCEDURE — 96372 THER/PROPH/DIAG INJ SC/IM: CPT

## 2023-06-30 PROCEDURE — 36430 TRANSFUSION BLD/BLD COMPNT: CPT

## 2023-06-30 PROCEDURE — 36415 COLL VENOUS BLD VENIPUNCTURE: CPT

## 2023-06-30 RX ORDER — ERYTHROPOIETIN 10000 [IU]/ML
30000 INJECTION, SOLUTION INTRAVENOUS; SUBCUTANEOUS ONCE
Refills: 0 | Status: COMPLETED | OUTPATIENT
Start: 2023-06-30 | End: 2023-06-30

## 2023-06-30 RX ORDER — IRON SUCROSE 20 MG/ML
200 INJECTION, SOLUTION INTRAVENOUS ONCE
Refills: 0 | Status: COMPLETED | OUTPATIENT
Start: 2023-06-30 | End: 2023-06-30

## 2023-06-30 RX ORDER — FUROSEMIDE 40 MG
20 TABLET ORAL ONCE
Refills: 0 | Status: COMPLETED | OUTPATIENT
Start: 2023-06-30 | End: 2023-06-30

## 2023-06-30 RX ADMIN — IRON SUCROSE 220 MILLIGRAM(S): 20 INJECTION, SOLUTION INTRAVENOUS at 12:24

## 2023-06-30 RX ADMIN — ERYTHROPOIETIN 30000 UNIT(S): 10000 INJECTION, SOLUTION INTRAVENOUS; SUBCUTANEOUS at 12:24

## 2023-07-02 NOTE — HISTORY OF PRESENT ILLNESS
[FreeTextEntry1] : Ms. LATRICIA ARREAGA 77 year F, former smoker, here today for evaluation of their aortic stenosis. 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  Pt states she has known about her severe AS for about 3 years. Symptoms include SOB. All questions and concerns were addressed with the patient. Pre-op planning was discussed with the patient, including dental clearance. Patient was educated on the risks and alternatives to surgery. STS was calculated and discussed with the pt, as well of need for MCOT post procedure and risk for PPM. \par \par NYHA class: II\par CCS class: I\par Pt lives home - no aide \par Former smoker quit 20 years ago\par Retired  \par \par Their healthcare team includes the following\par PMD: Peloro\par Cardio: Munir Paula \par Nepho: Dr. Peng Camejo \par GI: El Douaihy \par Hemonc: Odaimi \par \par 5 Meter walk\par 1. 5.6\par 2. 5.7\par 3. 5.5\par \par Frailty: \par Right: 19.8, 18. 9, 19.9\par Left: 18.6, 19. 4, 18. 7\par

## 2023-07-02 NOTE — ASSESSMENT
[FreeTextEntry1] : Ms. LATRICIA ARREAGA 77 year F, here today for evaluation of their aortic stenosis. All questions and concerns were addressed with the patient. Pre-op planning was discussed with the patient, including dental clearance. Patient was educated on the risks and alternatives to surgery. STS was calculated and discussed with the pt, as well of need for MCOT post procedure and risk for PPM. \par \par \par Echo reviewed \par Pt has history of anemia receiving transfusions/iron infusions every week and does blood work weekly - will need recs from hemonc prior to TAVR and CATH\par It was discussed that pt will be receiving blood thinners during TAVR as well as after - to be discussed with Dr. Gaston and Dr. Sean woods \par Pt has mediport R CW for infusions\par Pt is pending a colonoscopy with her GI -will need prior to TAVR \par Will also need recs from nepho - cr 2.3-2.6 prior to TAVR and CATH, discussed possibility of HD after TAVR\par Explained to pt in detail the risk of HD after CATH as well as TAVR - at this time pt is not agreeable to HD however would like to f/u for further discussion after colonoscopy  \par \par Colonoscopy planned for 8/2/2023\par F/U CTS after colonoscopy for further discussion of AS possible TAVR \par

## 2023-07-02 NOTE — END OF VISIT
[FreeTextEntry3] : \par Severe aortic stenosis.\par \par Multiple comorbidities as above.  Notably, CKD and transfusion dependent anemia (GAVE).\par \par Scheduled for endoscopy.  Will discuss diagnosis / prognosis further with GI and hematology.  If AVM / Heyde syndrome contributing to GIB, TAVR may improve condition.  Otherwise, risk / benefit of TAVR must be carefully considered as there is chance of EVAN worsening of GIB with procedural anticoagulation / post-procedure antiplatelets.\par Will discuss fdu

## 2023-07-03 ENCOUNTER — APPOINTMENT (OUTPATIENT)
Dept: CARDIOLOGY | Facility: CLINIC | Age: 78
End: 2023-07-03

## 2023-07-07 ENCOUNTER — APPOINTMENT (OUTPATIENT)
Dept: INFUSION THERAPY | Facility: CLINIC | Age: 78
End: 2023-07-07

## 2023-07-07 ENCOUNTER — OUTPATIENT (OUTPATIENT)
Dept: OUTPATIENT SERVICES | Facility: HOSPITAL | Age: 78
LOS: 1 days | End: 2023-07-07
Payer: MEDICARE

## 2023-07-07 DIAGNOSIS — Z98.890 OTHER SPECIFIED POSTPROCEDURAL STATES: Chronic | ICD-10-CM

## 2023-07-07 DIAGNOSIS — D64.9 ANEMIA, UNSPECIFIED: ICD-10-CM

## 2023-07-07 DIAGNOSIS — Z87.19 PERSONAL HISTORY OF OTHER DISEASES OF THE DIGESTIVE SYSTEM: Chronic | ICD-10-CM

## 2023-07-07 DIAGNOSIS — Z90.49 ACQUIRED ABSENCE OF OTHER SPECIFIED PARTS OF DIGESTIVE TRACT: Chronic | ICD-10-CM

## 2023-07-07 PROCEDURE — 86902 BLOOD TYPE ANTIGEN DONOR EA: CPT

## 2023-07-07 PROCEDURE — 86922 COMPATIBILITY TEST ANTIGLOB: CPT

## 2023-07-07 PROCEDURE — 36415 COLL VENOUS BLD VENIPUNCTURE: CPT

## 2023-07-07 PROCEDURE — 36430 TRANSFUSION BLD/BLD COMPNT: CPT

## 2023-07-07 PROCEDURE — 96372 THER/PROPH/DIAG INJ SC/IM: CPT

## 2023-07-07 PROCEDURE — P9040: CPT

## 2023-07-07 RX ORDER — ERYTHROPOIETIN 10000 [IU]/ML
30000 INJECTION, SOLUTION INTRAVENOUS; SUBCUTANEOUS ONCE
Refills: 0 | Status: COMPLETED | OUTPATIENT
Start: 2023-07-07 | End: 2023-07-07

## 2023-07-07 RX ADMIN — ERYTHROPOIETIN 30000 UNIT(S): 10000 INJECTION, SOLUTION INTRAVENOUS; SUBCUTANEOUS at 12:14

## 2023-07-07 NOTE — ED ADULT TRIAGE NOTE - NS ED TRIAGE EKG FT
md campbell Niacinamide Pregnancy And Lactation Text: These medications are considered safe during pregnancy.

## 2023-07-14 ENCOUNTER — APPOINTMENT (OUTPATIENT)
Dept: INFUSION THERAPY | Facility: CLINIC | Age: 78
End: 2023-07-14

## 2023-07-14 ENCOUNTER — OUTPATIENT (OUTPATIENT)
Dept: OUTPATIENT SERVICES | Facility: HOSPITAL | Age: 78
LOS: 1 days | End: 2023-07-14
Payer: MEDICARE

## 2023-07-14 DIAGNOSIS — D64.9 ANEMIA, UNSPECIFIED: ICD-10-CM

## 2023-07-14 DIAGNOSIS — Z90.49 ACQUIRED ABSENCE OF OTHER SPECIFIED PARTS OF DIGESTIVE TRACT: Chronic | ICD-10-CM

## 2023-07-14 DIAGNOSIS — Z98.890 OTHER SPECIFIED POSTPROCEDURAL STATES: Chronic | ICD-10-CM

## 2023-07-14 DIAGNOSIS — Z87.19 PERSONAL HISTORY OF OTHER DISEASES OF THE DIGESTIVE SYSTEM: Chronic | ICD-10-CM

## 2023-07-14 PROCEDURE — 96372 THER/PROPH/DIAG INJ SC/IM: CPT

## 2023-07-14 RX ORDER — ERYTHROPOIETIN 10000 [IU]/ML
30000 INJECTION, SOLUTION INTRAVENOUS; SUBCUTANEOUS ONCE
Refills: 0 | Status: COMPLETED | OUTPATIENT
Start: 2023-07-14 | End: 2023-07-14

## 2023-07-14 RX ADMIN — ERYTHROPOIETIN 30000 UNIT(S): 10000 INJECTION, SOLUTION INTRAVENOUS; SUBCUTANEOUS at 13:12

## 2023-07-16 ENCOUNTER — INPATIENT (INPATIENT)
Facility: HOSPITAL | Age: 78
LOS: 3 days | Discharge: LEFT BEFORE DISCHARGE | DRG: 378 | End: 2023-07-20
Attending: HOSPITALIST | Admitting: INTERNAL MEDICINE
Payer: MEDICARE

## 2023-07-16 VITALS
SYSTOLIC BLOOD PRESSURE: 82 MMHG | HEIGHT: 67 IN | OXYGEN SATURATION: 97 % | RESPIRATION RATE: 18 BRPM | DIASTOLIC BLOOD PRESSURE: 53 MMHG | HEART RATE: 66 BPM

## 2023-07-16 DIAGNOSIS — Z90.49 ACQUIRED ABSENCE OF OTHER SPECIFIED PARTS OF DIGESTIVE TRACT: Chronic | ICD-10-CM

## 2023-07-16 DIAGNOSIS — Z98.890 OTHER SPECIFIED POSTPROCEDURAL STATES: Chronic | ICD-10-CM

## 2023-07-16 DIAGNOSIS — Z87.19 PERSONAL HISTORY OF OTHER DISEASES OF THE DIGESTIVE SYSTEM: Chronic | ICD-10-CM

## 2023-07-16 LAB
ALBUMIN SERPL ELPH-MCNC: 3 G/DL — LOW (ref 3.5–5.2)
ALP SERPL-CCNC: 83 U/L — SIGNIFICANT CHANGE UP (ref 30–115)
ALT FLD-CCNC: <5 U/L — SIGNIFICANT CHANGE UP (ref 0–41)
ANION GAP SERPL CALC-SCNC: 5 MMOL/L — LOW (ref 7–14)
ANISOCYTOSIS BLD QL: SLIGHT — SIGNIFICANT CHANGE UP
APTT BLD: 30.5 SEC — SIGNIFICANT CHANGE UP (ref 27–39.2)
AST SERPL-CCNC: 11 U/L — SIGNIFICANT CHANGE UP (ref 0–41)
BASE EXCESS BLDV CALC-SCNC: -4 MMOL/L — LOW (ref -2–3)
BASOPHILS # BLD AUTO: 0 K/UL — SIGNIFICANT CHANGE UP (ref 0–0.2)
BASOPHILS NFR BLD AUTO: 0 % — SIGNIFICANT CHANGE UP (ref 0–1)
BILIRUB SERPL-MCNC: 0.6 MG/DL — SIGNIFICANT CHANGE UP (ref 0.2–1.2)
BLD GP AB SCN SERPL QL: SIGNIFICANT CHANGE UP
BUN SERPL-MCNC: 57 MG/DL — HIGH (ref 10–20)
CA-I SERPL-SCNC: 1.19 MMOL/L — SIGNIFICANT CHANGE UP (ref 1.15–1.33)
CALCIUM SERPL-MCNC: 7.8 MG/DL — LOW (ref 8.4–10.4)
CHLORIDE SERPL-SCNC: 113 MMOL/L — HIGH (ref 98–110)
CO2 SERPL-SCNC: 23 MMOL/L — SIGNIFICANT CHANGE UP (ref 17–32)
CREAT SERPL-MCNC: 2.4 MG/DL — HIGH (ref 0.7–1.5)
EGFR: 20 ML/MIN/1.73M2 — LOW
EOSINOPHIL # BLD AUTO: 0.19 K/UL — SIGNIFICANT CHANGE UP (ref 0–0.7)
EOSINOPHIL NFR BLD AUTO: 5.2 % — SIGNIFICANT CHANGE UP (ref 0–8)
GAS PNL BLDV: 138 MMOL/L — SIGNIFICANT CHANGE UP (ref 136–145)
GAS PNL BLDV: SIGNIFICANT CHANGE UP
GAS PNL BLDV: SIGNIFICANT CHANGE UP
GLUCOSE SERPL-MCNC: 111 MG/DL — HIGH (ref 70–99)
HCO3 BLDV-SCNC: 23 MMOL/L — SIGNIFICANT CHANGE UP (ref 22–29)
HCT VFR BLD CALC: 22.2 % — LOW (ref 37–47)
HCT VFR BLDA CALC: 23 % — LOW (ref 39–51)
HGB BLD CALC-MCNC: 7.6 G/DL — LOW (ref 12.6–17.4)
HGB BLD-MCNC: 6.7 G/DL — CRITICAL LOW (ref 12–16)
HYPOCHROMIA BLD QL: SIGNIFICANT CHANGE UP
INR BLD: 1.14 RATIO — SIGNIFICANT CHANGE UP (ref 0.65–1.3)
LACTATE BLDV-MCNC: 1.1 MMOL/L — SIGNIFICANT CHANGE UP (ref 0.5–2)
LYMPHOCYTES # BLD AUTO: 0.68 K/UL — LOW (ref 1.2–3.4)
LYMPHOCYTES # BLD AUTO: 18.1 % — LOW (ref 20.5–51.1)
MACROCYTES BLD QL: SLIGHT — SIGNIFICANT CHANGE UP
MAGNESIUM SERPL-MCNC: 2.2 MG/DL — SIGNIFICANT CHANGE UP (ref 1.8–2.4)
MANUAL SMEAR VERIFICATION: SIGNIFICANT CHANGE UP
MCHC RBC-ENTMCNC: 29.4 PG — SIGNIFICANT CHANGE UP (ref 27–31)
MCHC RBC-ENTMCNC: 30.2 G/DL — LOW (ref 32–37)
MCV RBC AUTO: 97.4 FL — SIGNIFICANT CHANGE UP (ref 81–99)
MONOCYTES # BLD AUTO: 0.13 K/UL — SIGNIFICANT CHANGE UP (ref 0.1–0.6)
MONOCYTES NFR BLD AUTO: 3.4 % — SIGNIFICANT CHANGE UP (ref 1.7–9.3)
NEUTROPHILS # BLD AUTO: 2.73 K/UL — SIGNIFICANT CHANGE UP (ref 1.4–6.5)
NEUTROPHILS NFR BLD AUTO: 71.6 % — SIGNIFICANT CHANGE UP (ref 42.2–75.2)
NEUTS BAND # BLD: 1.7 % — SIGNIFICANT CHANGE UP (ref 0–6)
PCO2 BLDV: 48 MMHG — HIGH (ref 39–42)
PH BLDV: 7.28 — LOW (ref 7.32–7.43)
PLAT MORPH BLD: NORMAL — SIGNIFICANT CHANGE UP
PLATELET # BLD AUTO: 107 K/UL — LOW (ref 130–400)
PMV BLD: 10.7 FL — HIGH (ref 7.4–10.4)
PO2 BLDV: 24 MMHG — SIGNIFICANT CHANGE UP
POIKILOCYTOSIS BLD QL AUTO: SLIGHT — SIGNIFICANT CHANGE UP
POLYCHROMASIA BLD QL SMEAR: SIGNIFICANT CHANGE UP
POTASSIUM BLDV-SCNC: 6.2 MMOL/L — CRITICAL HIGH (ref 3.5–5.1)
POTASSIUM SERPL-MCNC: 6 MMOL/L — CRITICAL HIGH (ref 3.5–5)
POTASSIUM SERPL-SCNC: 6 MMOL/L — CRITICAL HIGH (ref 3.5–5)
PROT SERPL-MCNC: 5.1 G/DL — LOW (ref 6–8)
PROTHROM AB SERPL-ACNC: 13 SEC — HIGH (ref 9.95–12.87)
RBC # BLD: 2.28 M/UL — LOW (ref 4.2–5.4)
RBC # FLD: 15.7 % — HIGH (ref 11.5–14.5)
RBC BLD AUTO: ABNORMAL
SAO2 % BLDV: 30.9 % — SIGNIFICANT CHANGE UP
SODIUM SERPL-SCNC: 141 MMOL/L — SIGNIFICANT CHANGE UP (ref 135–146)
TROPONIN T SERPL-MCNC: <0.01 NG/ML — SIGNIFICANT CHANGE UP
WBC # BLD: 3.73 K/UL — LOW (ref 4.8–10.8)
WBC # FLD AUTO: 3.73 K/UL — LOW (ref 4.8–10.8)

## 2023-07-16 PROCEDURE — 99291 CRITICAL CARE FIRST HOUR: CPT

## 2023-07-16 PROCEDURE — 71045 X-RAY EXAM CHEST 1 VIEW: CPT | Mod: 26

## 2023-07-16 RX ORDER — DEXTROSE 50 % IN WATER 50 %
50 SYRINGE (ML) INTRAVENOUS ONCE
Refills: 0 | Status: COMPLETED | OUTPATIENT
Start: 2023-07-16 | End: 2023-07-16

## 2023-07-16 RX ORDER — PANTOPRAZOLE SODIUM 20 MG/1
80 TABLET, DELAYED RELEASE ORAL ONCE
Refills: 0 | Status: COMPLETED | OUTPATIENT
Start: 2023-07-16 | End: 2023-07-16

## 2023-07-16 RX ORDER — METOCLOPRAMIDE HCL 10 MG
10 TABLET ORAL ONCE
Refills: 0 | Status: COMPLETED | OUTPATIENT
Start: 2023-07-16 | End: 2023-07-16

## 2023-07-16 RX ORDER — INSULIN HUMAN 100 [IU]/ML
10 INJECTION, SOLUTION SUBCUTANEOUS ONCE
Refills: 0 | Status: COMPLETED | OUTPATIENT
Start: 2023-07-16 | End: 2023-07-16

## 2023-07-16 RX ORDER — ONDANSETRON 8 MG/1
4 TABLET, FILM COATED ORAL ONCE
Refills: 0 | Status: COMPLETED | OUTPATIENT
Start: 2023-07-16 | End: 2023-07-16

## 2023-07-16 RX ORDER — PANTOPRAZOLE SODIUM 20 MG/1
8 TABLET, DELAYED RELEASE ORAL
Qty: 80 | Refills: 0 | Status: DISCONTINUED | OUTPATIENT
Start: 2023-07-16 | End: 2023-07-18

## 2023-07-16 RX ORDER — CALCIUM GLUCONATE 100 MG/ML
2 VIAL (ML) INTRAVENOUS ONCE
Refills: 0 | Status: COMPLETED | OUTPATIENT
Start: 2023-07-16 | End: 2023-07-16

## 2023-07-16 RX ORDER — SODIUM CHLORIDE 9 MG/ML
1000 INJECTION, SOLUTION INTRAVENOUS ONCE
Refills: 0 | Status: COMPLETED | OUTPATIENT
Start: 2023-07-16 | End: 2023-07-16

## 2023-07-16 RX ORDER — ALBUTEROL 90 UG/1
2.5 AEROSOL, METERED ORAL
Refills: 0 | Status: DISCONTINUED | OUTPATIENT
Start: 2023-07-16 | End: 2023-07-17

## 2023-07-16 RX ORDER — MAGNESIUM SULFATE 500 MG/ML
2 VIAL (ML) INJECTION ONCE
Refills: 0 | Status: COMPLETED | OUTPATIENT
Start: 2023-07-16 | End: 2023-07-16

## 2023-07-16 RX ORDER — DIPHENHYDRAMINE HCL 50 MG
50 CAPSULE ORAL ONCE
Refills: 0 | Status: COMPLETED | OUTPATIENT
Start: 2023-07-16 | End: 2023-07-16

## 2023-07-16 RX ADMIN — ALBUTEROL 2.5 MILLIGRAM(S): 90 AEROSOL, METERED ORAL at 22:14

## 2023-07-16 RX ADMIN — INSULIN HUMAN 10 UNIT(S): 100 INJECTION, SOLUTION SUBCUTANEOUS at 22:03

## 2023-07-16 RX ADMIN — Medication 200 GRAM(S): at 22:03

## 2023-07-16 RX ADMIN — Medication 50 MILLILITER(S): at 22:04

## 2023-07-16 RX ADMIN — Medication 10 MILLIGRAM(S): at 20:22

## 2023-07-16 RX ADMIN — PANTOPRAZOLE SODIUM 80 MILLIGRAM(S): 20 TABLET, DELAYED RELEASE ORAL at 20:22

## 2023-07-16 RX ADMIN — Medication 50 MILLIGRAM(S): at 21:03

## 2023-07-16 RX ADMIN — ONDANSETRON 4 MILLIGRAM(S): 8 TABLET, FILM COATED ORAL at 22:41

## 2023-07-16 RX ADMIN — Medication 104 MILLIGRAM(S): at 21:03

## 2023-07-16 RX ADMIN — ALBUTEROL 2.5 MILLIGRAM(S): 90 AEROSOL, METERED ORAL at 22:41

## 2023-07-16 RX ADMIN — SODIUM CHLORIDE 1000 MILLILITER(S): 9 INJECTION, SOLUTION INTRAVENOUS at 20:23

## 2023-07-16 RX ADMIN — Medication 25 GRAM(S): at 22:41

## 2023-07-16 RX ADMIN — PANTOPRAZOLE SODIUM 10 MG/HR: 20 TABLET, DELAYED RELEASE ORAL at 22:13

## 2023-07-16 NOTE — ED PROVIDER NOTE - CLINICAL SUMMARY MEDICAL DECISION MAKING FREE TEXT BOX
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  Patient also reporting lower abdominal pain, throbbing, mild, nonradiating, no alleviating or precipitating factors. Patient denies being on anticoagulation.  Follows with heme-onc Dr. Suh.   No fever, chills, n/v, cp,  pleuritic cp, sob, palpitations, diaphoresis, cough, ha/lh/dizziness, numbness/tingling, neck pain/ stiffness,  diarrhea, constipation, melena/brbpr (last bm shortly pta and states was normal), urinary symptoms, trauma,  edema, calf pain/swelling/erythema, sick contacts, recent travel or rash. no hx of etoh abuse/ivda.     Vital Signs: I have reviewed the initial vital signs. Constitutional: Non toxic appearing pt sitting on stretcher speaking full sentences. Integumentary: No rash. ENT: dried blood noted in airway. NECK: Supple, non-tender, no meningeal signs. Cardiovascular: RRR, radial pulses 2/4 b/l. No JVD. Respiratory: BS present b/l, ctabl, no wheezing or crackles,  no accessory muscle use, no stridor. Gastrointestinal: BS present throughout all 4 quadrants, soft, nd, llq abd pain to palpation, no rebound tenderness or guarding, no cvat. Musculoskeletal: FROM, no edema, no calf pain/swelling/erythema. Neurologic: AAOx3, motor 5/5 and sensation intact throughout upper and lowe ext, CN II-XII intact, No facial droop or slurring of speech. No focal deficits.    Plan: Monitor, EKG, CXR, labs, protonix, reglan, imaging, reassess.    Labs and EKG were ordered and reviewed.  Imaging was ordered and reviewed by me.  Appropriate medications for patient's presenting complaints were ordered and effects were reassessed.  Patient's records (prior hospital, ED visit) were reviewed.  Additional history was obtained from EMS.

## 2023-07-16 NOTE — ED PROVIDER NOTE - PROGRESS NOTE DETAILS
ED Attending MARÍA Cai  pt hypotensive, vomited blood, prbc obtained from blood bank, pt reports previous transfusion reactions requires benadryl and steroid, medications being administered. DANE: pt is refusing rectal exam ED Attending MARÍA Cai  pt hypotensive, vomited blood, prbc obtained from blood bank, pt consented for transfusion, pt reports previous transfusion reactions requires benadryl and steroid, medications being administered. ED Attending MARÍA Cai  lab called and state the potassium 6.0, not hemolyzed, medications for hyperkalemia being ordered.  Patient with BUN/creatinine of 57/2.4, previous in June 2023 58/2.8. DANE: pt adamantly refusing second IV line placement. First placed under US guidance no longer working. Midline placed to RUE, she is now refusing another large bore IV placement. Discussed risks and benefits of second large bore IV in setting of both anemia/GI bleeding and hyperkalemia requiring medications however pt still refused. DANE: pt adamantly refusing second IV line placement. First placed under US guidance no longer working. Midline placed to RUE, she is now refusing another large bore IV placement. Discussed risks and benefits of second large bore IV in setting of both anemia/GI bleeding and hyperkalemia requiring medications however pt still refused.  ED Attending MARÍA Cai  gi consulted. ED Attending MARÍA Cai  Pt endorsed to Dr. Conroy, follow up imaging, gi, reassess. DANE: CTA without evidence of active hemorrhage, discussed with GI who advised treatment as prescribed, transfuse hgb <9, and admit to MICU with plan for EGD in the AM. Pt admitted to MICU. repeat vbg ordered for hyperkalemia, discussed with MICU team who will follow Authored by Rosaline Conroy DO: Pt signed out to from Dr. Cai - patient reassessed, resting comfortably. GI aware. ICU consulted - plan for admission for ICU.

## 2023-07-16 NOTE — ED PROVIDER NOTE - PATIENT'S PREFERRED PRONOUN
Her/She Cell[employer] Counseling:  I discussed with the patient the risks of mycophenolate mofetil including but not limited to infection/immunosuppression, GI upset, hypokalemia, hypercholesterolemia, bone marrow suppression, lymphoproliferative disorders, malignancy, GI ulceration/bleed/perforation, colitis, interstitial lung disease, kidney failure, progressive multifocal leukoencephalopathy, and birth defects.  The patient understands that monitoring is required including a baseline creatinine and regular CBC testing. In addition, patient must alert us immediately if symptoms of infection or other concerning signs are noted.

## 2023-07-16 NOTE — ED PROVIDER NOTE - OBJECTIVE STATEMENT
77-year-old female with PMH of severe aortic stenosis, CKD, prior GI bleeding due to AVMs, duodenal ulcer status post endoclips, hypertension presents for bloody emesis today.  Patient reports 2 hours ago she started feeling lower abdominal pain described as sharp, nonradiating.  + Multiple associated episodes of hemoptysis. States she currently feels nauseas but has no other complaints. Last bowel movement earlier this morning was brown without black/tarry or bloody stools. She denies fever/chills, cough, CP, SOB, dizziness, diarrhea. States she has HX of transfusion reactions and usually requires benadryl and steroids.

## 2023-07-16 NOTE — ED PROVIDER NOTE - ATTENDING CONTRIBUTION TO CARE
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  Patient also reporting lower abdominal pain, throbbing, mild, nonradiating, no alleviating or precipitating factors. Patient denies being on anticoagulation.  Follows with heme-onc Dr. Suh.   No fever, chills, n/v, cp,  pleuritic cp, sob, palpitations, diaphoresis, cough, ha/lh/dizziness, numbness/tingling, neck pain/ stiffness,  diarrhea, constipation, melena/brbpr (last bm shortly pta and states was normal), urinary symptoms, trauma,  edema, calf pain/swelling/erythema, sick contacts, recent travel or rash. no hx of etoh abuse/ivda.     Vital Signs: I have reviewed the initial vital signs. Constitutional: Non toxic appearing pt sitting on stretcher speaking full sentences. Integumentary: No rash. ENT: dried blood noted in airway. NECK: Supple, non-tender, no meningeal signs. Cardiovascular: RRR, radial pulses 2/4 b/l. No JVD. Respiratory: BS present b/l, ctabl, no wheezing or crackles,  no accessory muscle use, no stridor. Gastrointestinal: BS present throughout all 4 quadrants, soft, nd, llq abd pain to palpation, no rebound tenderness or guarding, no cvat. Musculoskeletal: FROM, no edema, no calf pain/swelling/erythema. Neurologic: AAOx3, motor 5/5 and sensation intact throughout upper and lowe ext, CN II-XII intact, No facial droop or slurring of speech. No focal deficits.    Plan: Monitor, EKG, CXR, labs, protonix, reglan, imaging, reassess.

## 2023-07-16 NOTE — ED PROVIDER NOTE - DIFFERENTIAL DIAGNOSIS
anemia 2/2 to gib lower vs upper, infectious vs metabolic etiology, intraabdominal pathology Differential Diagnosis

## 2023-07-16 NOTE — ED PROVIDER NOTE - CARE PLAN
Principal Discharge DX:	GI bleed  Secondary Diagnosis:	Anemia  Secondary Diagnosis:	Hyperkalemia   1

## 2023-07-16 NOTE — ED PROVIDER NOTE - PHYSICAL EXAMINATION
VITAL SIGNS: I have reviewed nursing notes and confirm.  CONSTITUTIONAL: Well-developed; well-nourished; in no acute distress.  SKIN: Skin exam is warm and dry, no acute rash.  HEAD: Normocephalic; atraumatic.  EYES: PERRL, conjunctiva and sclera clear.  ENT: MMM, OP clear. Pt has dried blood to lower lip and to b/l nares, no active bleeding  CARD: S1, S2 normal; no murmurs, gallops, or rubs. Regular rate and rhythm.  RESP: Normal respiratory effort, no tachypnea or distress. Lungs CTAB, no wheezes, rales or rhonchi.  ABD: soft, ND, (+) LLQ TTP without rebound/guarding/rigidity.   EXT: Normal ROM. No clubbing, cyanosis or edema.  NEURO: Alert, oriented. Grossly unremarkable. No focal deficits.  PSYCH: Cooperative, appropriate.

## 2023-07-17 DIAGNOSIS — Z51.5 ENCOUNTER FOR PALLIATIVE CARE: ICD-10-CM

## 2023-07-17 DIAGNOSIS — E87.5 HYPERKALEMIA: ICD-10-CM

## 2023-07-17 DIAGNOSIS — K92.2 GASTROINTESTINAL HEMORRHAGE, UNSPECIFIED: ICD-10-CM

## 2023-07-17 DIAGNOSIS — I35.0 NONRHEUMATIC AORTIC (VALVE) STENOSIS: ICD-10-CM

## 2023-07-17 DIAGNOSIS — D64.9 ANEMIA, UNSPECIFIED: ICD-10-CM

## 2023-07-17 LAB
ALBUMIN SERPL ELPH-MCNC: 3.4 G/DL — LOW (ref 3.5–5.2)
ALP SERPL-CCNC: 85 U/L — SIGNIFICANT CHANGE UP (ref 30–115)
ALT FLD-CCNC: 6 U/L — SIGNIFICANT CHANGE UP (ref 0–41)
ANION GAP SERPL CALC-SCNC: 9 MMOL/L — SIGNIFICANT CHANGE UP (ref 7–14)
ANION GAP SERPL CALC-SCNC: 9 MMOL/L — SIGNIFICANT CHANGE UP (ref 7–14)
AST SERPL-CCNC: 13 U/L — SIGNIFICANT CHANGE UP (ref 0–41)
BASE EXCESS BLDV CALC-SCNC: -7.2 MMOL/L — LOW (ref -2–3)
BILIRUB SERPL-MCNC: 1 MG/DL — SIGNIFICANT CHANGE UP (ref 0.2–1.2)
BUN SERPL-MCNC: 72 MG/DL — CRITICAL HIGH (ref 10–20)
BUN SERPL-MCNC: 75 MG/DL — CRITICAL HIGH (ref 10–20)
CA-I SERPL-SCNC: 1.31 MMOL/L — SIGNIFICANT CHANGE UP (ref 1.15–1.33)
CALCIUM SERPL-MCNC: 9 MG/DL — SIGNIFICANT CHANGE UP (ref 8.4–10.4)
CALCIUM SERPL-MCNC: 9 MG/DL — SIGNIFICANT CHANGE UP (ref 8.4–10.5)
CHLORIDE SERPL-SCNC: 112 MMOL/L — HIGH (ref 98–110)
CHLORIDE SERPL-SCNC: 113 MMOL/L — HIGH (ref 98–110)
CO2 SERPL-SCNC: 18 MMOL/L — SIGNIFICANT CHANGE UP (ref 17–32)
CO2 SERPL-SCNC: 19 MMOL/L — SIGNIFICANT CHANGE UP (ref 17–32)
CREAT SERPL-MCNC: 2.4 MG/DL — HIGH (ref 0.7–1.5)
CREAT SERPL-MCNC: 2.5 MG/DL — HIGH (ref 0.7–1.5)
EGFR: 19 ML/MIN/1.73M2 — LOW
EGFR: 20 ML/MIN/1.73M2 — LOW
GAS PNL BLDV: 140 MMOL/L — SIGNIFICANT CHANGE UP (ref 136–145)
GAS PNL BLDV: SIGNIFICANT CHANGE UP
GLUCOSE SERPL-MCNC: 104 MG/DL — HIGH (ref 70–99)
GLUCOSE SERPL-MCNC: 129 MG/DL — HIGH (ref 70–99)
HCO3 BLDV-SCNC: 19 MMOL/L — LOW (ref 22–29)
HCT VFR BLD CALC: 27.1 % — LOW (ref 37–47)
HCT VFR BLD CALC: 27.8 % — LOW (ref 37–47)
HCT VFR BLDA CALC: 27 % — LOW (ref 39–51)
HGB BLD CALC-MCNC: 9 G/DL — LOW (ref 12.6–17.4)
HGB BLD-MCNC: 8.7 G/DL — LOW (ref 12–16)
HGB BLD-MCNC: 8.9 G/DL — LOW (ref 12–16)
LACTATE BLDV-MCNC: 1.6 MMOL/L — SIGNIFICANT CHANGE UP (ref 0.5–2)
MAGNESIUM SERPL-MCNC: 2.6 MG/DL — HIGH (ref 1.8–2.4)
MCHC RBC-ENTMCNC: 29.1 PG — SIGNIFICANT CHANGE UP (ref 27–31)
MCHC RBC-ENTMCNC: 29.6 PG — SIGNIFICANT CHANGE UP (ref 27–31)
MCHC RBC-ENTMCNC: 32 G/DL — SIGNIFICANT CHANGE UP (ref 32–37)
MCHC RBC-ENTMCNC: 32.1 G/DL — SIGNIFICANT CHANGE UP (ref 32–37)
MCV RBC AUTO: 90.8 FL — SIGNIFICANT CHANGE UP (ref 81–99)
MCV RBC AUTO: 92.2 FL — SIGNIFICANT CHANGE UP (ref 81–99)
NRBC # BLD: 0 /100 WBCS — SIGNIFICANT CHANGE UP (ref 0–0)
NRBC # BLD: 0 /100 WBCS — SIGNIFICANT CHANGE UP (ref 0–0)
PCO2 BLDV: 39 MMHG — SIGNIFICANT CHANGE UP (ref 39–42)
PH BLDV: 7.29 — LOW (ref 7.32–7.43)
PHOSPHATE SERPL-MCNC: 2.7 MG/DL — SIGNIFICANT CHANGE UP (ref 2.1–4.9)
PLATELET # BLD AUTO: 107 K/UL — LOW (ref 130–400)
PLATELET # BLD AUTO: 111 K/UL — LOW (ref 130–400)
PMV BLD: 11 FL — HIGH (ref 7.4–10.4)
PMV BLD: 11 FL — HIGH (ref 7.4–10.4)
PO2 BLDV: 38 MMHG — SIGNIFICANT CHANGE UP
POTASSIUM BLDV-SCNC: 6.4 MMOL/L — CRITICAL HIGH (ref 3.5–5.1)
POTASSIUM SERPL-MCNC: 5.7 MMOL/L — HIGH (ref 3.5–5)
POTASSIUM SERPL-MCNC: 6.1 MMOL/L — CRITICAL HIGH (ref 3.5–5)
POTASSIUM SERPL-SCNC: 5.7 MMOL/L — HIGH (ref 3.5–5)
POTASSIUM SERPL-SCNC: 6.1 MMOL/L — CRITICAL HIGH (ref 3.5–5)
PROT SERPL-MCNC: 5.3 G/DL — LOW (ref 6–8)
RBC # BLD: 2.94 M/UL — LOW (ref 4.2–5.4)
RBC # BLD: 3.06 M/UL — LOW (ref 4.2–5.4)
RBC # FLD: 17.1 % — HIGH (ref 11.5–14.5)
RBC # FLD: 17.5 % — HIGH (ref 11.5–14.5)
SAO2 % BLDV: 58.3 % — SIGNIFICANT CHANGE UP
SODIUM SERPL-SCNC: 139 MMOL/L — SIGNIFICANT CHANGE UP (ref 135–146)
SODIUM SERPL-SCNC: 141 MMOL/L — SIGNIFICANT CHANGE UP (ref 135–146)
WBC # BLD: 2.96 K/UL — LOW (ref 4.8–10.8)
WBC # BLD: 5.86 K/UL — SIGNIFICANT CHANGE UP (ref 4.8–10.8)
WBC # FLD AUTO: 2.96 K/UL — LOW (ref 4.8–10.8)
WBC # FLD AUTO: 5.86 K/UL — SIGNIFICANT CHANGE UP (ref 4.8–10.8)

## 2023-07-17 PROCEDURE — 74174 CTA ABD&PLVS W/CONTRAST: CPT | Mod: 26,MA

## 2023-07-17 PROCEDURE — 85018 HEMOGLOBIN: CPT

## 2023-07-17 PROCEDURE — 85027 COMPLETE CBC AUTOMATED: CPT

## 2023-07-17 PROCEDURE — 99222 1ST HOSP IP/OBS MODERATE 55: CPT

## 2023-07-17 PROCEDURE — 86901 BLOOD TYPING SEROLOGIC RH(D): CPT

## 2023-07-17 PROCEDURE — 94760 N-INVAS EAR/PLS OXIMETRY 1: CPT

## 2023-07-17 PROCEDURE — 80048 BASIC METABOLIC PNL TOTAL CA: CPT

## 2023-07-17 PROCEDURE — 85610 PROTHROMBIN TIME: CPT

## 2023-07-17 PROCEDURE — 85730 THROMBOPLASTIN TIME PARTIAL: CPT

## 2023-07-17 PROCEDURE — 83735 ASSAY OF MAGNESIUM: CPT

## 2023-07-17 PROCEDURE — 99223 1ST HOSP IP/OBS HIGH 75: CPT

## 2023-07-17 PROCEDURE — 93005 ELECTROCARDIOGRAM TRACING: CPT

## 2023-07-17 PROCEDURE — 83605 ASSAY OF LACTIC ACID: CPT

## 2023-07-17 PROCEDURE — 84295 ASSAY OF SERUM SODIUM: CPT

## 2023-07-17 PROCEDURE — 84132 ASSAY OF SERUM POTASSIUM: CPT

## 2023-07-17 PROCEDURE — 86850 RBC ANTIBODY SCREEN: CPT

## 2023-07-17 PROCEDURE — 80053 COMPREHEN METABOLIC PANEL: CPT

## 2023-07-17 PROCEDURE — 82803 BLOOD GASES ANY COMBINATION: CPT

## 2023-07-17 PROCEDURE — 99232 SBSQ HOSP IP/OBS MODERATE 35: CPT

## 2023-07-17 PROCEDURE — 84100 ASSAY OF PHOSPHORUS: CPT

## 2023-07-17 PROCEDURE — 85014 HEMATOCRIT: CPT

## 2023-07-17 PROCEDURE — 99497 ADVNCD CARE PLAN 30 MIN: CPT | Mod: 25

## 2023-07-17 PROCEDURE — 86900 BLOOD TYPING SEROLOGIC ABO: CPT

## 2023-07-17 PROCEDURE — 85025 COMPLETE CBC W/AUTO DIFF WBC: CPT

## 2023-07-17 PROCEDURE — 82330 ASSAY OF CALCIUM: CPT

## 2023-07-17 PROCEDURE — C9113: CPT

## 2023-07-17 PROCEDURE — 36415 COLL VENOUS BLD VENIPUNCTURE: CPT

## 2023-07-17 RX ORDER — SODIUM ZIRCONIUM CYCLOSILICATE 10 G/10G
10 POWDER, FOR SUSPENSION ORAL EVERY 8 HOURS
Refills: 0 | Status: DISCONTINUED | OUTPATIENT
Start: 2023-07-17 | End: 2023-07-19

## 2023-07-17 RX ORDER — SODIUM BICARBONATE 1 MEQ/ML
650 SYRINGE (ML) INTRAVENOUS EVERY 8 HOURS
Refills: 0 | Status: DISCONTINUED | OUTPATIENT
Start: 2023-07-17 | End: 2023-07-20

## 2023-07-17 RX ORDER — FOLIC ACID 0.8 MG
1 TABLET ORAL DAILY
Refills: 0 | Status: DISCONTINUED | OUTPATIENT
Start: 2023-07-17 | End: 2023-07-20

## 2023-07-17 RX ORDER — CALCIUM GLUCONATE 100 MG/ML
2 VIAL (ML) INTRAVENOUS ONCE
Refills: 0 | Status: COMPLETED | OUTPATIENT
Start: 2023-07-17 | End: 2023-07-17

## 2023-07-17 RX ORDER — CHLORHEXIDINE GLUCONATE 213 G/1000ML
1 SOLUTION TOPICAL
Refills: 0 | Status: DISCONTINUED | OUTPATIENT
Start: 2023-07-17 | End: 2023-07-20

## 2023-07-17 RX ORDER — DIPHENHYDRAMINE HCL 50 MG
25 CAPSULE ORAL ONCE
Refills: 0 | Status: COMPLETED | OUTPATIENT
Start: 2023-07-17 | End: 2023-07-17

## 2023-07-17 RX ORDER — DIPHENHYDRAMINE HCL 50 MG
25 CAPSULE ORAL ONCE
Refills: 0 | Status: DISCONTINUED | OUTPATIENT
Start: 2023-07-17 | End: 2023-07-17

## 2023-07-17 RX ADMIN — SODIUM ZIRCONIUM CYCLOSILICATE 10 GRAM(S): 10 POWDER, FOR SUSPENSION ORAL at 21:08

## 2023-07-17 RX ADMIN — Medication 200 GRAM(S): at 12:38

## 2023-07-17 RX ADMIN — SODIUM ZIRCONIUM CYCLOSILICATE 10 GRAM(S): 10 POWDER, FOR SUSPENSION ORAL at 14:10

## 2023-07-17 RX ADMIN — Medication 650 MILLIGRAM(S): at 17:34

## 2023-07-17 RX ADMIN — PANTOPRAZOLE SODIUM 10 MG/HR: 20 TABLET, DELAYED RELEASE ORAL at 18:48

## 2023-07-17 RX ADMIN — Medication 25 MILLIGRAM(S): at 22:18

## 2023-07-17 RX ADMIN — Medication 1 MILLIGRAM(S): at 12:38

## 2023-07-17 NOTE — CONSULT NOTE ADULT - CONVERSATION DETAILS
Spoke with patient at bedside. Palliative care introduced. She was able to provide a medical history and hospital course. Discussed overall GOC and code status, including DNR/DNI. She noted she would discuss it with her son. She wishes to maintain full code status.

## 2023-07-17 NOTE — H&P ADULT - ATTENDING COMMENTS
events noted, presented with hematemesis ( prior hx of GI bleed), no blood thinner, hb noted, hyperkalemia / Chronic renal failure/ severe AS  UGI, serial CBC, repeat CMP, PPI drip, transfuse, GI/ cardio eval, pulmonary standpoint no contraindication to procedure, MICU

## 2023-07-17 NOTE — PATIENT PROFILE ADULT - FALL HARM RISK - RISK INTERVENTIONS

## 2023-07-17 NOTE — CONSULT NOTE ADULT - SUBJECTIVE AND OBJECTIVE BOX
Outpt cardiologist:  Nisa Mancia and     Reason for Consult: Risk stratification prior to EGD    HISTORY OF PRESENT ILLNESS:  77F w/ h/o prior GI bleed (due to AVM's and duodenal ulcers), severe AS, and CKD p/w hematemesis. Symptoms started yesterday. Patient was in usual state of health when she felt sudden, sharp, lower abdominal pain. After this, pt had multiple episodes of hematemesis (reportedly ~2 pints over 2 hours), thereby prompting ED evaluation. Abdominal pain and nausea have improved since vomiting, but still persist. Has BM's and able to pass flatus as of yesterday. No bloody BM's or melena noted. Despite multiple admissions related to GI bleeds, pt reports never experiencing hematemesis before. No reported AC or AP use. No reported fevers, chills, CP, palpitations, SOB, dysuria, or LE swelling.    In ED, pt hypotensive (BP 82/53) on vitals. Labs demonstrated Hb 6.7 (Hb 8.5 two weeks ago) and K 6.0. EKG demonstrated prolonged QTc (513ms), but no acute changes a/w hyperkalemia. CTA AP negative for active GI bleed. GI consulted and recommended admission to MICU w/ plan for EGD in AM. S/P 1L LR bolus, 2u pRBC, pantoprazole 80mg IV plus gtt (8mg/hr) insulin 10u IV, D50% 25g IV, calcium gluconate 2g IV, Mg 2g IV, Reglan 10mg IV, and Zofran 4mg IV. Repeat /82 after fluids and pRBC's. Admitted to MICU for upper GI bleed. (2023 04:29)    Symptoms:  No chest pain, no dyspnea, no orthopnea, no PND, no palpitations, no syncope, no edema    Risk Factors:  Severe AS Pk/Mn gradient 120/68 - SHILA 0.9cm2  HFpEF  CKD (creat 2.5-3)  PAST MEDICAL & SURGICAL HISTORY  HTN (hypertension)    Heart murmur    Eczema    Anemia  iron infusions and PRBC transfusions    Aortic stenosis    Chronic kidney disease (CKD)     novel coronavirus disease (COVID-19)  2020- [facility] admission    Gastric AVM    H/O appendicitis    S/P cholecystectomy    History of esophagogastroduodenoscopy (EGD)    H/O colonoscopy        FAMILY HISTORY:  FAMILY HISTORY:  FH: kidney disease (Father)    FH: breast cancer (Mother)    FH: multiple myeloma (Mother)        SOCIAL HISTORY:  Social History:  . retired. Lives in apartment w/ son's family. Able to perform all ADL's and most IADL's independently. Ambulates w/o mechanical assistive device inside home, but use walker/cane for longer distances. (2023 04:29)      ALLERGIES:  aspirin (Rash; Other)  EKG leads (Hives)  penicillins (Rash; Urticaria; Hives; Blisters)  latex (Unknown)      MEDICATIONS:  chlorhexidine 2% Cloths 1 Application(s) Topical <User Schedule>  folic acid 1 milliGRAM(s) Oral daily  pantoprazole Infusion 8 mG/Hr (10 mL/Hr) IV Continuous <Continuous>    PRN:      HOME MEDICATIONS:  Home Medications:  folic acid 1 mg oral tablet: 1 tab(s) orally once a day (30 May 2023 16:00)  furosemide 40 mg oral tablet: 1 tab(s) orally once a day (30 May 2023 16:00)  metoprolol tartrate 25 mg oral tablet: 1 tab(s) orally 2 times a day (30 May 2023 16:00)      VITALS:   T(F): 97.6 ( @ 09:00), Max: 98.2 ( @ 07:36)  HR: 78 ( @ 11:00) (66 - 86)  BP: 116/54 ( @ 11:00) (82/53 - 125/82)  BP(mean): 77 ( @ 11:00) (77 - 79)  RR: 24 ( @ 11:00) (18 - 28)  SpO2: 100% ( @ 11:00) (97% - 100%)    I&O's Summary      REVIEW OF SYSTEMS:  CONSTITUTIONAL: No weakness, fevers or chills  HEENT: No visual changes, neck/ear pain  RESPIRATORY: No cough, sob  CARDIOVASCULAR: See HPI  GASTROINTESTINAL: No abdominal pain. No nausea, vomiting, diarrhea   GENITOURINARY: No dysuria, frequency or hematuria  NEUROLOGICAL: No new focal deficits  SKIN: No new rashes    PHYSICAL EXAM:  General: Not in distress.  Non-toxic appearing.   Cardio: regular, S1, Grade 3/6 high pitch systolic murmur loudest @RUSB radiating to carotid, absent S2  Pulm: B/L BS.  No wheezing / crackles / rales  Abdomen: Soft, non-tender, non-distended. Normoactive bowel sounds  Extremities: Minimal pitting edema in bilateral LE  Neuro: A&O x3. No focal deficits    LABS:                        6.7    3.73  )-----------( 156      ( 2023 20:30 )             22.2     07-    141  |  113<H>  |  57<H>  ----------------------------<  111<H>  6.0<HH>   |  23  |  2.4<H>    Ca    7.8<L>      2023 20:30  Mg     2.2     -    TPro  5.1<L>  /  Alb  3.0<L>  /  TBili  0.6  /  DBili  x   /  AST  11  /  ALT  <5  /  AlkPhos  83  07-16    PT/INR - ( 2023 20:30 )   PT: 13.00 sec;   INR: 1.14 ratio         PTT - ( 2023 20:30 )  PTT:30.5 sec  Troponin T, Serum: <0.01 ng/mL (23 @ 20:30)    CARDIAC MARKERS ( 2023 20:30 )  x     / <0.01 ng/mL / x     / x     / x            Troponin trend:        COVID-19 PCR: NotDetec (31 Mar 2023 02:45)  COVID-19 PCR: NotDetec (21 Mar 2023 16:10)  COVID-19 PCR: NotDetec (02 Mar 2023 16:47)      IMAGING:  -TTE: 3/22/23: Mild LVH, EF 62%, Grade II diastoliuc dysfucntion, Severe AS Pk/Mn 120/38, SHILA 0.9cm2    EC Lead ECG:   Ventricular Rate 66 BPM    Atrial Rate 66 BPM    P-R Interval 148 ms    QRS Duration 122 ms    Q-T Interval 490 ms    QTC Calculation(Bazett) 513 ms    R Axis -48 degrees    T Axis 3 degrees    Diagnosis Line Normal sinus rhythm  Right bundle branch block  Left anterior fascicular block  *** Bifascicular block ***  Abnormal ECG    Confirmed by El Zhao ([id number]) on 2023 12:35:56 AM (16 @ 20:32)      TELEMETRY EVENTS:  None

## 2023-07-17 NOTE — H&P ADULT - NSHPPHYSICALEXAM_GEN_ALL_CORE
VITALS  T(C): 36.2 (07-16-23 @ 22:44), Max: 36.2 (07-16-23 @ 22:44)  HR: 81 (07-16-23 @ 22:45) (66 - 86)  BP: 120/57 (07-16-23 @ 22:45) (82/53 - 125/82)  RR: 18 (07-16-23 @ 22:45) (18 - 18)  SpO2: 97% (07-16-23 @ 22:45) (97% - 97%)    PHYSICAL EXAM  GENERAL: no acute respiratory distress  NEURO: AO x3, PERRLA, EOMI, motor strength intact in 4/4 extremities, sensation intact  HEAD:  Atraumatic, Normocephalic  EYES: conjunctiva and sclera clear  NECK: Supple, No JVD, no lymphadenopathy, no thyromegaly  CHEST/LUNG: Clear to auscultation bilaterally; No wheezes, rales or rhonchi  HEART: systolic murmur; Regular rate and rhythm  ABDOMEN: mild diffuse abdominal tenderness; soft, nondistended; no guarding, no rigidity; Bowel sounds present, no masses.  EXTREMITIES:  2+ Peripheral Pulses, No clubbing, cyanosis, or edema  SKIN: Warm, dry, intact, no rashes or lesions  PSYCH: affect appropriate VITALS  T(C): 36.2 (07-16-23 @ 22:44), Max: 36.2 (07-16-23 @ 22:44)  HR: 81 (07-16-23 @ 22:45) (66 - 86)  BP: 120/57 (07-16-23 @ 22:45) (82/53 - 125/82)  RR: 18 (07-16-23 @ 22:45) (18 - 18)  SpO2: 97% (07-16-23 @ 22:45) (97% - 97%)    PHYSICAL EXAM  GENERAL: no acute respiratory distress  NEURO: AO x3, PERRLA, EOMI, motor strength intact in 4/4 extremities, sensation intact  HEAD:  Atraumatic, Normocephalic  EYES: conjunctiva and sclera clear  NECK: Supple, No JVD, no lymphadenopathy, no thyromegaly  CHEST/LUNG: Clear to auscultation bilaterally; No wheezes, rales or rhonchi  HEART: KATHYA 3/6  ABDOMEN: mild diffuse abdominal tenderness; soft, nondistended; no guarding, no rigidity; Bowel sounds present, no masses.  EXTREMITIES:  2+ Peripheral Pulses, No clubbing, cyanosis, or edema  SKIN: Warm, dry, intact, no rashes or lesions  PSYCH: affect appropriate

## 2023-07-17 NOTE — CONSULT NOTE ADULT - SUBJECTIVE AND OBJECTIVE BOX
CC:  hematemesis    HPI:  77F w/ h/o prior GI bleed (due to AVM's and duodenal ulcers), severe AS, and CKD p/w hematemesis. Symptoms started yesterday. Patient was in usual state of health when she felt sudden, sharp, lower abdominal pain. After this, pt had multiple episodes of hematemesis (reportedly ~2 pints over 2 hours), thereby prompting ED evaluation. Abdominal pain and nausea have improved since vomiting, but still persist. Has BM's and able to pass flatus as of yesterday. No bloody BM's or melena noted. Despite multiple admissions related to GI bleeds, pt reports never experiencing hematemesis before. No reported AC or AP use. No reported fevers, chills, CP, palpitations, SOB, dysuria, or LE swelling.    In ED, pt hypotensive (BP 82/53) on vitals. Labs demonstrated Hb 6.7 (Hb 8.5 two weeks ago) and K 6.0. EKG demonstrated prolonged QTc (513ms), but no acute changes a/w hyperkalemia. CTA AP negative for active GI bleed. GI consulted and recommended admission to MICU w/ plan for EGD in AM. S/P 1L LR bolus, 2u pRBC, pantoprazole 80mg IV plus gtt (8mg/hr) insulin 10u IV, D50% 25g IV, calcium gluconate 2g IV, Mg 2g IV, Reglan 10mg IV, and Zofran 4mg IV. Repeat /82 after fluids and pRBC's. Admitted to MICU for upper GI bleed. (17 Jul 2023 04:29)    PERTINENT PM/SXH:   HTN (hypertension)    Heart murmur    Eczema    Anemia    Aortic stenosis    Chronic kidney disease (CKD)    2019 novel coronavirus disease (COVID-19)    Gastric AVM      H/O appendicitis    H/O cholecystitis    S/P cholecystectomy    History of esophagogastroduodenoscopy (EGD)    H/O colonoscopy      FAMILY HISTORY:  FH: kidney disease (Father)    FH: breast cancer (Mother)    FH: multiple myeloma (Mother)      ITEMS NOT CHECKED ARE NOT PRESENT    SOCIAL HISTORY:   Significant other/partner[ ]  Children[ ]  Hinduism/Spirituality:  Substance hx:  [ ]   Tobacco hx:  [ ]   Alcohol hx: [ ]   Living Situation: [x ]Home  [ ]Long term care  [ ]Rehab [ ]Other  Home Services: [ ] HHA [ ] Dana RN [ ] Hospice  Occupation:  Home Opioid hx:  [ ] Y [ ] N [x ] I-Stop Reference No:  Reference #: [id number] - no meds     ADVANCE DIRECTIVES:     [x ] Full Code [ ] DNR  MOLST  [ ]  Living Will  [ ]   DECISION MAKER(s):  [ ] Health Care Proxy(s)  [x ] Surrogate(s)  [ ] Guardian           Name(s): Phone Number(s):  Adithya Acuña      BASELINE (I)ADL(s) (prior to admission):    [place]: [ ]Total  [ ] Moderate [ ]Dependent  Palliative Performance Status Version 2:         %    http://npcrc.org/files/news/palliative_performance_scale_[email or web address].pdf    Allergies    aspirin (Rash; Other)  EKG leads (Hives)  penicillins (Rash; Urticaria; Hives; Blisters)  latex (Unknown)    Intolerances    MEDICATIONS  (STANDING):  chlorhexidine 2% Cloths 1 Application(s) Topical <User Schedule>  folic acid 1 milliGRAM(s) Oral daily  pantoprazole Infusion 8 mG/Hr (10 mL/Hr) IV Continuous <Continuous>  sodium bicarbonate 650 milliGRAM(s) Oral every 8 hours  sodium zirconium cyclosilicate 10 Gram(s) Oral every 8 hours    MEDICATIONS  (PRN):    PRESENT SYMPTOMS: [ ]Unable to obtain due to poor mentation   Source if other than patient:  [ ]Family   [ ]Team     Pain: [ ]yes [x ]no  QOL impact -   Location -                    Aggravating factors -  Quality -  Radiation -  Timing-  Severity (0-10 scale):  Minimal acceptable level (0-10 scale):     CPOT:    https://www.sccm.org/getattachment/hlc17h49-2s9a-1g2j-3n9s-7130z4137j8n/Critical-Care-Pain-Observation-Tool-(CPOT)    PAIN AD Score:   http://geriatrictoolkit.[place].[facility]/cog/painad.pdf (press ctrl +  left click to view)    Dyspnea:                           [ x]None[ ]Mild [ ]Moderate [ ]Severe     Respiratory Distress Observation Scale (RDOS):   A score of 0 to 2 signifies little or no respiratory distress, 3 signifies mild distress, scores 4 to 6 indicate moderate distress, and scores greater than 7 signify severe distress  [email or web address]://[email or web address]/sites/default/files/PDFS/741522-usvqyquykyr-gvooxdra-frxdnaqlutz-kodtv.pdf    Anxiety:                             [ x]None[ ]Mild [ ]Moderate [ ]Severe   Fatigue:                             [x ]None[ ]Mild [ ]Moderate [ ]Severe   Nausea:                             [ ]None[x ]Mild [ ]Moderate [ ]Severe   Loss of appetite:              [ x]None[ ]Mild [ ]Moderate [ ]Severe   Constipation:                    [x ]None[ ]Mild [ ]Moderate [ ]Severe    Other Symptoms:  [x ]All other review of systems negative     Palliative Performance Status Version 2:         30%    http://[email or web address]/news/palliative_performance_scale_[email or web address].pdf    PHYSICAL EXAM:  Vital Signs Last 24 Hrs  T(C): 36.8 (17 Jul 2023 12:00), Max: 36.8 (17 Jul 2023 07:36)  T(F): 98.3 (17 Jul 2023 12:00), Max: 98.3 (17 Jul 2023 12:00)  HR: 75 (17 Jul 2023 16:00) (66 - 86)  BP: 117/56 (17 Jul 2023 16:00) (82/53 - 125/82)  BP(mean): 80 (17 Jul 2023 16:00) (77 - 95)  RR: 17 (17 Jul 2023 16:00) (17 - 28)  SpO2: 98% (17 Jul 2023 16:00) (94% - 100%)    Parameters below as of 17 Jul 2023 11:51  Patient On (Oxygen Delivery Method): room air     I&O's Summary    17 Jul 2023 07:01  -  17 Jul 2023 17:23  --------------------------------------------------------  IN: 70 mL / OUT: 0 mL / NET: 70 mL        GENERAL:  [ ] No acute distress [ ]Lethargic  [ ]Unarousable  [x ]Verbal  [ ]Non-Verbal [ ]Cachexia    BEHAVIORAL/PSYCH:  [ x]Alert and Oriented x3  [ ] Anxiety [ ] Delirium [ ] Agitation [ ] Calm   EYES: [x ] No scleral icterus [ ] Scleral icterus [ ] Closed  ENMT:  [ ]Dry mouth  [x ]No external oral lesions [ ] No external ear or nose lesions  CARDIOVASCULAR:  [ ]Regular [ ]Irregular [ ]Tachy [ ]Not Tachy  [ ]Joseph [ ] Edema [ ] No edema  PULMONARY:  [ ]Tachypnea  [ ]Audible excessive secretions [ x] No labored breathing [ ] labored breathing  GASTROINTESTINAL: [ ]Soft  [ ]Distended  [x ]Not distended [ ]Non tender [ ]Tender  MUSCULOSKELETAL: [ ]No clubbing [ ] clubbing  [x ] No cyanosis [ ] cyanosis  NEUROLOGIC: [ ]No focal deficits  [x ]Follows commands  [ ]Does not follow commands  [ ]Cognitive impairment  [ ]Dysphagia  [ ]Dysarthria  [ ]Paresis   SKIN: [ ] Jaundiced [x ] Non-jaundiced [ ]Rash [ ]No Rash [ ] Warm [ ] Dry  MISC/LINES: [ ] ET tube [ ] Trach [ ]NGT/OGT [ ]PEG [ ]Arora    LABS: reviewed by me                        8.9    2.96  )-----------( 107      ( 17 Jul 2023 10:37 )             27.8   07-17    141  |  113<H>  |  75<HH>  ----------------------------<  129<H>  6.1<HH>   |  19  |  2.5<H>    Ca    9.0      17 Jul 2023 11:49  Phos  2.7     07-17  Mg     2.6     07-17    TPro  5.3<L>  /  Alb  3.4<L>  /  TBili  1.0  /  DBili  x   /  AST  13  /  ALT  6   /  AlkPhos  85  07-17  PT/INR - ( 16 Jul 2023 20:30 )   PT: 13.00 sec;   INR: 1.14 ratio         PTT - ( 16 Jul 2023 20:30 )  PTT:30.5 sec    Urinalysis Basic - ( 17 Jul 2023 11:49 )    Color: x / Appearance: x / SG: x / pH: x  Gluc: 129 mg/dL / Ketone: x  / Bili: x / Urobili: x   Blood: x / Protein: x / Nitrite: x   Leuk Esterase: x / RBC: x / WBC x   Sq Epi: x / Non Sq Epi: x / Bacteria: x      RADIOLOGY & ADDITIONAL STUDIES: reviewed by me    < from: CT Angio Abdomen and Pelvis w/ IV Cont (07.17.23 @ 01:25) >  IMPRESSION:    No evidence of acute intra-abdominal or pelvic pathology.  No evidence of active gastrointestinal hemorrhage in the arterial or   venous phases.    < end of copied text >      EKG: reviewed by me  < from: 12 Lead ECG (07.16.23 @ 20:32) >  Ventricular Rate 66 BPM    Atrial Rate 66 BPM    P-R Interval 148 ms    QRS Duration 122 ms    Q-T Interval 490 ms    QTC Calculation(Bazett) 513 ms    R Axis -48 degrees    T Axis 3 degrees    Diagnosis Line Normal sinus rhythm  Right bundle branch block  Left anterior fascicular block  *** Bifascicular block ***  Abnormal ECG    < end of copied text >      PROTEIN CALORIE MALNUTRITION PRESENT: [ ]mild [ ]moderate [ ]severe [ ]underweight [ ]morbid obesity  https://www.andeal.org/vault/2440/web/files/ONC/Table_Clinical%20Characteristics%20to%20Document%20Malnutrition-White%20JV%20et%20al%600740.pdf    Height (cm): 170.2 (07-16-23 @ 19:55), 170.2 (06-02-23 @ 10:50), 170.2 (05-18-23 @ 18:17)  Weight (kg): 104.8 (06-02-23 @ 10:50), 105.233 (05-18-23 @ 18:17), 105.2 (04-20-23 @ 16:08)  BMI (kg/m2): 36.2 (07-16-23 @ 19:55), 36.2 (06-02-23 @ 10:50), 36.3 (05-18-23 @ 18:17)  [ ]PPSV2 < or = to 30% [ ]significant weight loss  [ ]poor nutritional intake  [ ]anasarca      [ ]Artificial Nutrition      Palliative Care Spiritual/Emotional Screening Tool Question  Severity (0-4):                    OR                    [ x] Unable to determine/NA  Score of 2 or greater indicates recommendation of Chaplaincy referral  Chaplaincy Referral: [ ] Yes [ ] Refused [ ] Following     Caregiver [place]ar:  [ ] Yes [ ] No [x ] Deferred  Social Work Referral [ ]  Patient and Family Centered Care Referral [ ]    Anticipatory Grief Present: [ ] Yes [ x] No [ ] Deferred  Social Work Referral [ x]  Patient and Family Centered Care Referral [ ]    Patient discussed with primary medical team MD  Palliative care education provided to patient and/or family

## 2023-07-17 NOTE — CONSULT NOTE ADULT - PROBLEM SELECTOR RECOMMENDATION 3
Potassium 6.1 this morning. History of CKD  -treatment of hyperkalemia per ICU team  -trend creatinine/lytes

## 2023-07-17 NOTE — CONSULT NOTE ADULT - SUBJECTIVE AND OBJECTIVE BOX
Gastroenterology Consultation:    Patient is a 77y old  Female who presents with a chief complaint of hematemesis (17 Jul 2023 04:29)        Admitted on: 07-17-23      HPI:  77F w/ h/o chronic anemia requiring transfusions (has a port, follows Dr. Gaston was on procrit and tranexamic acid) , prior GI bleed (due to AVMs in gastric body, antrum, bulb) s/p EGDx2, Colonoscopy x1, VCE x2, SB enteroscopy , severe AS pending TAVR , and CKD p/w hematemesis. GI consulted for acute anemia and hematemesis      Symptoms started yesterday. Patient was in usual state of health when she felt sudden, sharp, lower abdominal pain. After this, pt had multiple episodes of hematemesis (reportedly ~2 pints over 2 hours), thereby prompting ED evaluation. Abdominal pain and nausea have improved since vomiting, but still persist. Has BM's and able to pass flatus as of yesterday. No bloody BM's or melena noted. Despite multiple admissions related to GI bleeds, pt reports never experiencing hematemesis before. No reported AC or AP use. No reported fevers, chills, CP, palpitations, SOB, dysuria, or LE swelling.    In ED, pt hypotensive (BP 82/53) on vitals. Labs demonstrated Hb 6.7 (Hb 8.5 two weeks ago) and K 6.0. EKG demonstrated prolonged QTc (513ms), but no acute changes a/w hyperkalemia. CTA AP negative for active GI bleed. GI consulted and recommended admission to MICU w/ plan for EGD in AM. S/P 1L LR bolus, 2u pRBC, pantoprazole 80mg IV plus gtt (8mg/hr) insulin 10u IV, D50% 25g IV, calcium gluconate 2g IV, Mg 2g IV, Reglan 10mg IV, and Zofran 4mg IV. Repeat /82 after fluids and pRBC's. Admitted to MICU for upper GI bleed. (17 Jul 2023 04:29)          Medication use: Denies any use of NSAIDs, C/S, ASA, EtOh and Antithrombotics or herbal remedies like Gingko, Garlic, and Ginseng    GI Hx: Denies any hx of bleeds, dx of CLD of IBD, any malignancies, dx of PUD. Patient has not had any recent GI procedures including spincterotomy or polypectomy. Denies any vascular surgical intervention and hx of radiation therapy. Patient denies any fever, abdominal pain, weight loss, dysphagia.     Prior EGD:  10/19:  by Dr. Robles, for anemia   Esophagitis compatible with non-erosive esophagitis.    Erythema and congestion in the stomach.    Erythema and congestion in the duodenum compatible with duodenitis.     2020: by  for anemia     Normal mucosa in the whole esophagus.    Erythema, congestion and erosive in the antrum. (Biopsy: negative HP).    Erythema in the duodenum compatible with duodenitis.    -prominent brunners glands.     Plan: Await pathology results   capsule endoscopy today/repeat colonoscopy as outpatient    2020: Bleeding on VCE; by Dr. Sean Powell'  Normal esophagus.    Mosaic pattern mucosa was not in the fundus/body suggestive of possible  underlying PHG. Biopsy taken.    Erythema noted in the antrum with possible underlying gastritis. Biopsy taken.    The bulbar mucosa was nodular. This could be due to underlying lymphoid  hyperplasia. Biopsies taken.    The single balloon assisted enteroscope was advanced to approximately to the  mid jejunum until no advancement was possible. At this point a 10ml evra ink  was injected to jackelyn to extent of this exam. No source of active bleeding or  stigmata of recent bleeding was identified. .    The enteroscope was exchanged for the conventional EGD scope for better  assessment of the esophagus stomach and duodenum. .     Prior Colonoscopy:  10/2019: negative EGD, anemia workup by ; good prep : moderate diverticulosis in SC, DC, cecum; internal hemorrhoids      PAST MEDICAL & SURGICAL HISTORY:  HTN (hypertension)      Heart murmur      Eczema      Anemia  iron infusions and PRBC transfusions      Aortic stenosis      Chronic kidney disease (CKD)      2019 novel coronavirus disease (COVID-19)  4/2020- [facility] admission    Gastric AVM      H/O appendicitis      S/P cholecystectomy      History of esophagogastroduodenoscopy (EGD)      H/O colonoscopy      FAMILY HISTORY:  FH: kidney disease (Father)    FH: breast cancer (Mother)    FH: multiple myeloma (Mother)        Social History:  Tobacco: former smoker  Alcohol: occasional social  Drugs: none    Home Medications:  folic acid 1 mg oral tablet: 1 tab(s) orally once a day (30 May 2023 16:00)  furosemide 40 mg oral tablet: 1 tab(s) orally once a day (30 May 2023 16:00)  metoprolol tartrate 25 mg oral tablet: 1 tab(s) orally 2 times a day (30 May 2023 16:00)      MEDICATIONS  (STANDING):  chlorhexidine 2% Cloths 1 Application(s) Topical <User Schedule>  folic acid 1 milliGRAM(s) Oral daily  pantoprazole Infusion 8 mG/Hr (10 mL/Hr) IV Continuous <Continuous>    MEDICATIONS  (PRN):    Allergies  aspirin (Rash; Other)  EKG leads (Hives)  penicillins (Rash; Urticaria; Hives; Blisters)  latex (Unknown)      Review of Systems:   Constitutional:  No Fever, No Chills  ENT/Mouth:  No Hearing Changes,  No Difficulty Swallowing  Eyes:  No Eye Pain, No Vision Changes  Cardiovascular:  No Chest Pain, No Palpitations  Respiratory:  No Cough, No Dyspnea  Gastrointestinal:  As described in HPI  Musculoskeletal:  No Joint Swelling, No Back Pain  Skin:  No Skin Lesions, No Jaundice  Neuro:  No Syncope, No Dizziness  Heme/Lymph:  No Bruising, No Bleeding.      Physical Examination:  T(C): 36.8 (07-17-23 @ 07:36), Max: 36.8 (07-17-23 @ 07:36)  HR: 74 (07-17-23 @ 07:36) (66 - 86)  BP: 124/58 (07-17-23 @ 07:36) (82/53 - 125/82)  RR: 18 (07-17-23 @ 07:36) (18 - 18)  SpO2: 97% (07-17-23 @ 07:36) (97% - 97%)  Height (cm): 170.2 (07-16-23 @ 19:55)        GENERAL:  Appears stated age, morbidly obese  HEENT:  NC/AT,  conjunctivae clear and pink, no thyromegaly, nodules, adenopathy, sclera -anicteric  CHEST:  Full & symmetric excursion, no increased effort, breath sounds clear  HEART:  Regular rhythm, S1, S2, no murmur/rub/S3/S4, no abdominal bruit, no edema  ABDOMEN:  Soft, non-tender, non-distended, normoactive bowel sounds,  no masses ,no hepato-splenomegaly, no signs of chronic liver disease  EXTEREMITIES:  no cyanosis,clubbing or edema  SKIN:  No rash/erythema/ecchymoses/petechiae/wounds/abscess/warm/dry  NEURO:  Alert, oriented, no asterixis, no tremor, no encephalopathy      Data:                        6.7    3.73  )-----------( 156      ( 16 Jul 2023 20:30 )             22.2     Hgb Trend:  6.7  07-16-23 @ 20:30      07-16    141  |  113<H>  |  57<H>  ----------------------------<  111<H>  6.0<HH>   |  23  |  2.4<H>    Ca    7.8<L>      16 Jul 2023 20:30  Mg     2.2     07-16    TPro  5.1<L>  /  Alb  3.0<L>  /  TBili  0.6  /  DBili  x   /  AST  11  /  ALT  <5  /  AlkPhos  83  07-16    Liver panel trend:  TBili 0.6   /   AST 11   /   ALT <5   /   AlkP 83   /   Tptn 5.1   /   Alb 3.0    /   DBili --      07-16      PT/INR - ( 16 Jul 2023 20:30 )   PT: 13.00 sec;   INR: 1.14 ratio         PTT - ( 16 Jul 2023 20:30 )  PTT:30.5 sec        Radiology:  CT Angio Abdomen and Pelvis w/ IV Cont:   ACC: [id number] EXAM:  CT ANGIO ABD PELV (W)AW IC   ORDERED BY: SHAQ MOJICA     PROCEDURE DATE:  07/17/2023          INTERPRETATION:  INDICATION: Evaluation for gastrointestinal hemorrhage.   History of aortic stenosis with multiple gastrointestinal arteriovenous   malformations.    TECHNIQUE: Contiguous axial CT images were obtained from the lower chest   to the pubic symphysis before and following the administration of 100 cc   of Omnipaque 350 intravenous contrast in the arterial and venous phases.   Oral contrast was not administered. Reformatted images in the coronal and   sagittal planes were acquired. 3-D/MIP reformats were generated    CORRELATION/COMPARISON: CT abdomen pelvis 4/20/2023, CT abdomen pelvis   9/16/2013    FINDINGS:    LOWER CHEST: Unremarkable.    HEPATOBILIARY: Post-cholecystectomy. No focal liver mass. Patent portal   vein.    SPLEEN: Splenomegaly to 17 cm.    PANCREAS: Unremarkable.    ADRENAL GLANDS: 1.7 cm left adrenal nodule, stable since 2013.   Unremarkable right adrenal gland.    KIDNEYS: Left renal cysts. Symmetric renal enhancement. No hydronephrosis.    ABDOMINOPELVIC NODES: Prominent gastrohepatic lymph nodes, stable.    PELVIC ORGANS: Unremarkable.    PERITONEUM/MESENTERY/BOWEL: No evidence of bowel obstruction,   pneumoperitoneum, or ascites. The appendix is not definitively   visualized; no evidence of pericecal inflammatory changes. Colonic   diverticulosis without evidence of acute diverticulitis. Scattered   intraluminal linear hyperdensities of the duodenum and jejunum, some of   which appear new from prior exam 4/20/2023 and likely representing   endoclips. No evidence of intraluminal gastrointestinal contrast   extravasation to suggest hemorrhage. Splenorenal and perigastric varices   are noted.    BONES/SOFT TISSUES: Avascular necrosis of the right femoral head, stable   from 2013. Bony degenerative change. Chronic T12 compression deformity.      IMPRESSION:    No evidence of acute intra-abdominal or pelvic pathology.  No evidence of active gastrointestinal hemorrhage in the arterial or   venous phases.    --- End of Report ---    BRAD LEONARDO MD; Resident Radiologist  This document has been electronically signed.  SYLVIA CORNEJO MD; Attending Radiologist  This document has been electronically signed. Jul 17 2023  2:51AM (07-17-23 @ 01:25)       Gastroenterology Consultation:    Patient is a 77y old  Female who presents with a chief complaint of hematemesis (17 Jul 2023 04:29)        Admitted on: 07-17-23      HPI:  77F w/ h/o chronic anemia requiring transfusions (has a port, follows Dr. 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 GI consulted for acute anemia and hematemesis     Symptoms started yesterday. Patient was in usual state of health when she felt some mid abdominal pain. After this, she had two episodes of vomiting with some clots. Abdominal pain and nausea have improved since vomiting, but still persist. Has BM's and able to pass flatus as of yesterday. No bloody BM's or melena noted. Despite multiple admissions related to GI bleeds, pt reports never experiencing hematemesis before. No reported AC or AP use. No reported fevers, chills, CP, palpitations, SOB, dysuria, or LE swelling.    In ED, pt hypotensive (BP 82/53) on vitals. Labs demonstrated Hb 6.7 (Hb 8.5 two weeks ago) and K 6.0. EKG demonstrated prolonged QTc (513ms), but no acute changes a/w hyperkalemia. CTA AP negative for active GI bleed.  S/P 1L LR bolus, 2u pRBC, pantoprazole 80mg IV plus gtt (8mg/hr) insulin 10u IV, D50% 25g IV, calcium gluconate 2g IV, Mg 2g IV, Reglan 10mg IV, and Zofran 4mg IV. Repeat /82 after fluids and pRBC's. Admitted to MICU      Medication use: Denies any use of NSAIDs, C/S, ASA, EtOh and Antithrombotics or herbal remedies like Gingko, Garlic, and Ginseng      Prior EGD:  10/19:  by Dr. Robles, for anemia   Esophagitis compatible with non-erosive esophagitis.    Erythema and congestion in the stomach.    Erythema and congestion in the duodenum compatible with duodenitis.     2020: by  for anemia     Normal mucosa in the whole esophagus.    Erythema, congestion and erosive in the antrum. (Biopsy: negative HP).    Erythema in the duodenum compatible with duodenitis.    -prominent brunners glands.     Plan: Await pathology results   capsule endoscopy today/repeat colonoscopy as outpatient    2020: SB enteroscopy Bleeding on VCE; by Dr. Sean Powell'  Normal esophagus.    Mosaic pattern mucosa was not in the fundus/body suggestive of possible  underlying PHG. Biopsy taken.    Erythema noted in the antrum with possible underlying gastritis. Biopsy taken.    The bulbar mucosa was nodular. This could be due to underlying lymphoid  hyperplasia. Biopsies taken.    The single balloon assisted enteroscope was advanced to approximately to the  mid jejunum until no advancement was possible. At this point a 10ml vera ink  was injected to jackelyn to extent of this exam. No source of active bleeding or  stigmata of recent bleeding was identified. .    The enteroscope was exchanged for the conventional EGD scope for better  assessment of the esophagus stomach and duodenum. .     2022: by Dr. Perez Enteroscopy     Normal mucosa in the whole esophagus.    Angioectasias in the antrum. (Hemostasis).    Multiple small AVMs were noted in duodenal bulb and second part of duodenum.  APCs were applied for the purpose of hemostatsis.    PCF scope for enteroscopy. Proximal jejunum examined. No blood was noted in  jejunum. 2 small non-bleeding AVMs were noted in proximal jejunum. APCs were  applied for the purpose of hemostasis.    4/2023: [facility] for anemia   Normal mucosa in the whole esophagus.    Normal mucosa in the whole stomach.    -The bulbar mucosa was nodular. This could be due to underlying lymphoid  hyperplasia. Biopsies taken. .    -An oozing Dieulafoy lesion was seen in the Third part of the duodenum. Three  endoclips were applied to the Third part of the duodenum for the purpose of  hemostasis. .    Abnormal mucosa in the Jejunum (120 cm from incisors).    6/2023: Enteroscopy     Normal esophagus.    Normal stomach.    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    The previously placed clips in the second portion of the duodenum were seen  with an adjacent actively oozing AVM that was ablated using APC, followed by  placement of 2 endoclips.    A previously placed clip was seen in the proximal jejunum.    The scope was advanced to the proximal jejunum to around 150 cm from the  incisors and 4 AVMs were seen throughout the proximal jejunum. 3 AVMs were  treated with APC and placement of 1 endoclip (total 3). One AVM was treated with  APC only.      Prior Colonoscopy:  10/2019: negative EGD, anemia workup by ; good prep : moderate diverticulosis in SC, DC, cecum; internal hemorrhoids      PAST MEDICAL & SURGICAL HISTORY:  HTN (hypertension)      Heart murmur      Eczema      Anemia  iron infusions and PRBC transfusions      Aortic stenosis      Chronic kidney disease (CKD)      2019 novel coronavirus disease (COVID-19)  4/2020- [facility] admission    Gastric AVM      H/O appendicitis      S/P cholecystectomy      History of esophagogastroduodenoscopy (EGD)      H/O colonoscopy      FAMILY HISTORY:  FH: kidney disease (Father)    FH: breast cancer (Mother)    FH: multiple myeloma (Mother)        Social History:  Tobacco: former smoker  Alcohol: occasional social  Drugs: none    Home Medications:  folic acid 1 mg oral tablet: 1 tab(s) orally once a day (30 May 2023 16:00)  furosemide 40 mg oral tablet: 1 tab(s) orally once a day (30 May 2023 16:00)  metoprolol tartrate 25 mg oral tablet: 1 tab(s) orally 2 times a day (30 May 2023 16:00)      MEDICATIONS  (STANDING):  chlorhexidine 2% Cloths 1 Application(s) Topical <User Schedule>  folic acid 1 milliGRAM(s) Oral daily  pantoprazole Infusion 8 mG/Hr (10 mL/Hr) IV Continuous <Continuous>    MEDICATIONS  (PRN):    Allergies  aspirin (Rash; Other)  EKG leads (Hives)  penicillins (Rash; Urticaria; Hives; Blisters)  latex (Unknown)      Review of Systems:   Constitutional:  No Fever, No Chills  ENT/Mouth:  No Hearing Changes,  No Difficulty Swallowing  Eyes:  No Eye Pain, No Vision Changes  Cardiovascular:  No Chest Pain, No Palpitations  Respiratory:  No Cough, No Dyspnea  Gastrointestinal:  As described in HPI  Musculoskeletal:  No Joint Swelling, No Back Pain  Skin:  No Skin Lesions, No Jaundice  Neuro:  No Syncope, No Dizziness  Heme/Lymph:  No Bruising, No Bleeding.      Physical Examination:  T(C): 36.8 (07-17-23 @ 07:36), Max: 36.8 (07-17-23 @ 07:36)  HR: 74 (07-17-23 @ 07:36) (66 - 86)  BP: 124/58 (07-17-23 @ 07:36) (82/53 - 125/82)  RR: 18 (07-17-23 @ 07:36) (18 - 18)  SpO2: 97% (07-17-23 @ 07:36) (97% - 97%)  Height (cm): 170.2 (07-16-23 @ 19:55)        GENERAL:  Appears stated age, morbidly obese  HEENT:  NC/AT,  conjunctivae clear and pink, no thyromegaly, nodules, adenopathy, sclera -anicteric  CHEST:  Full & symmetric excursion, no increased effort, breath sounds clear  HEART:  Regular rhythm, S1, S2, no murmur/rub/S3/S4, no abdominal bruit, no edema  ABDOMEN:  Soft, non-tender, non-distended, normoactive bowel sounds,  no masses ,no hepato-splenomegaly, no signs of chronic liver disease  EXTEREMITIES:  no cyanosis,clubbing or edema  SKIN:  No rash/erythema/ecchymoses/petechiae/wounds/abscess/warm/dry  NEURO:  Alert, oriented, no asterixis, no tremor, no encephalopathy      Data:                        6.7    3.73  )-----------( 156      ( 16 Jul 2023 20:30 )             22.2     Hgb Trend:  6.7  07-16-23 @ 20:30      07-16    141  |  113<H>  |  57<H>  ----------------------------<  111<H>  6.0<HH>   |  23  |  2.4<H>    Ca    7.8<L>      16 Jul 2023 20:30  Mg     2.2     07-16    TPro  5.1<L>  /  Alb  3.0<L>  /  TBili  0.6  /  DBili  x   /  AST  11  /  ALT  <5  /  AlkPhos  83  07-16    Liver panel trend:  TBili 0.6   /   AST 11   /   ALT <5   /   AlkP 83   /   Tptn 5.1   /   Alb 3.0    /   DBili --      07-16      PT/INR - ( 16 Jul 2023 20:30 )   PT: 13.00 sec;   INR: 1.14 ratio         PTT - ( 16 Jul 2023 20:30 )  PTT:30.5 sec        Radiology:  CT Angio Abdomen and Pelvis w/ IV Cont:   ACC: [id number] EXAM:  CT ANGIO ABD PELV (W)AW IC   ORDERED BY: SHAQ MOJICA     PROCEDURE DATE:  07/17/2023          INTERPRETATION:  INDICATION: Evaluation for gastrointestinal hemorrhage.   History of aortic stenosis with multiple gastrointestinal arteriovenous   malformations.    TECHNIQUE: Contiguous axial CT images were obtained from the lower chest   to the pubic symphysis before and following the administration of 100 cc   of Omnipaque 350 intravenous contrast in the arterial and venous phases.   Oral contrast was not administered. Reformatted images in the coronal and   sagittal planes were acquired. 3-D/MIP reformats were generated    CORRELATION/COMPARISON: CT abdomen pelvis 4/20/2023, CT abdomen pelvis   9/16/2013    FINDINGS:    LOWER CHEST: Unremarkable.    HEPATOBILIARY: Post-cholecystectomy. No focal liver mass. Patent portal   vein.    SPLEEN: Splenomegaly to 17 cm.    PANCREAS: Unremarkable.    ADRENAL GLANDS: 1.7 cm left adrenal nodule, stable since 2013.   Unremarkable right adrenal gland.    KIDNEYS: Left renal cysts. Symmetric renal enhancement. No hydronephrosis.    ABDOMINOPELVIC NODES: Prominent gastrohepatic lymph nodes, stable.    PELVIC ORGANS: Unremarkable.    PERITONEUM/MESENTERY/BOWEL: No evidence of bowel obstruction,   pneumoperitoneum, or ascites. The appendix is not definitively   visualized; no evidence of pericecal inflammatory changes. Colonic   diverticulosis without evidence of acute diverticulitis. Scattered   intraluminal linear hyperdensities of the duodenum and jejunum, some of   which appear new from prior exam 4/20/2023 and likely representing   endoclips. No evidence of intraluminal gastrointestinal contrast   extravasation to suggest hemorrhage. Splenorenal and perigastric varices   are noted.    BONES/SOFT TISSUES: Avascular necrosis of the right femoral head, stable   from 2013. Bony degenerative change. Chronic T12 compression deformity.      IMPRESSION:    No evidence of acute intra-abdominal or pelvic pathology.  No evidence of active gastrointestinal hemorrhage in the arterial or   venous phases.    --- End of Report ---    BRAD LEONARDO MD; Resident Radiologist  This document has been electronically signed.  SYLVIA CORNEJO MD; Attending Radiologist  This document has been electronically signed. Jul 17 2023  2:51AM (07-17-23 @ 01:25)       Gastroenterology Consultation:    Patient is a 77y old  Female who presents with a chief complaint of hematemesis (17 Jul 2023 04:29)        Admitted on: 07-17-23      HPI:  77F w/ h/o chronic anemia requiring transfusions (has a port, follows Dr. 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 GI consulted for acute anemia and reported coffee ground emesis.     Symptoms started yesterday. Patient was in usual state of health when she felt some mid abdominal pain. After this, she had two episodes of vomiting with some clots. Abdominal pain and nausea have improved since vomiting, but still persist. Has BM's and able to pass flatus as of yesterday. No bloody BM's or melena noted. Despite multiple admissions related to GI bleeds, pt reports never experiencing hematemesis before. No reported AC or AP use. No reported fevers, chills, CP, palpitations, SOB, dysuria, or LE swelling.    In ED, pt hypotensive (BP 82/53) on vitals. Labs demonstrated Hb 6.7 (Hb 8.5 two weeks ago) and K 6.0. EKG demonstrated prolonged QTc (513ms), but no acute changes a/w hyperkalemia. CTA AP negative for active GI bleed.  S/P 1L LR bolus, 2u pRBC, pantoprazole 80mg IV plus gtt (8mg/hr) insulin 10u IV, D50% 25g IV, calcium gluconate 2g IV, Mg 2g IV, Reglan 10mg IV, and Zofran 4mg IV. Repeat /82 after fluids and pRBC's. Admitted to MICU      Medication use: Denies any use of NSAIDs, C/S, ASA, EtOh and Antithrombotics or herbal remedies like Gingko, Garlic, and Ginseng      Prior EGD:  10/19:  by Dr. Robles, for anemia   Esophagitis compatible with non-erosive esophagitis.    Erythema and congestion in the stomach.    Erythema and congestion in the duodenum compatible with duodenitis.     2020: by  for anemia     Normal mucosa in the whole esophagus.    Erythema, congestion and erosive in the antrum. (Biopsy: negative HP).    Erythema in the duodenum compatible with duodenitis.    -prominent brunners glands.     Plan: Await pathology results   capsule endoscopy today/repeat colonoscopy as outpatient    2020: SB enteroscopy Bleeding on VCE; by Dr. Sean Powell'  Normal esophagus.    Mosaic pattern mucosa was not in the fundus/body suggestive of possible  underlying PHG. Biopsy taken.    Erythema noted in the antrum with possible underlying gastritis. Biopsy taken.    The bulbar mucosa was nodular. This could be due to underlying lymphoid  hyperplasia. Biopsies taken.    The single balloon assisted enteroscope was advanced to approximately to the  mid jejunum until no advancement was possible. At this point a 10ml vera ink  was injected to jackelyn to extent of this exam. No source of active bleeding or  stigmata of recent bleeding was identified. .    The enteroscope was exchanged for the conventional EGD scope for better  assessment of the esophagus stomach and duodenum. .     2022: by Dr. Perez Enteroscopy     Normal mucosa in the whole esophagus.    Angioectasias in the antrum. (Hemostasis).    Multiple small AVMs were noted in duodenal bulb and second part of duodenum.  APCs were applied for the purpose of hemostatsis.    PCF scope for enteroscopy. Proximal jejunum examined. No blood was noted in  jejunum. 2 small non-bleeding AVMs were noted in proximal jejunum. APCs were  applied for the purpose of hemostasis.    4/2023: [facility] for anemia   Normal mucosa in the whole esophagus.    Normal mucosa in the whole stomach.    -The bulbar mucosa was nodular. This could be due to underlying lymphoid  hyperplasia. Biopsies taken. .    -An oozing Dieulafoy lesion was seen in the Third part of the duodenum. Three  endoclips were applied to the Third part of the duodenum for the purpose of  hemostasis. .    Abnormal mucosa in the Jejunum (120 cm from incisors).    6/2023: Enteroscopy     Normal esophagus.    Normal stomach.    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    The previously placed clips in the second portion of the duodenum were seen  with an adjacent actively oozing AVM that was ablated using APC, followed by  placement of 2 endoclips.    A previously placed clip was seen in the proximal jejunum.    The scope was advanced to the proximal jejunum to around 150 cm from the  incisors and 4 AVMs were seen throughout the proximal jejunum. 3 AVMs were  treated with APC and placement of 1 endoclip (total 3). One AVM was treated with  APC only.      Prior Colonoscopy:  10/2019: negative EGD, anemia workup by ; good prep : moderate diverticulosis in SC, DC, cecum; internal hemorrhoids      PAST MEDICAL & SURGICAL HISTORY:  HTN (hypertension)      Heart murmur      Eczema      Anemia  iron infusions and PRBC transfusions      Aortic stenosis      Chronic kidney disease (CKD)      2019 novel coronavirus disease (COVID-19)  4/2020- [facility] admission    Gastric AVM      H/O appendicitis      S/P cholecystectomy      History of esophagogastroduodenoscopy (EGD)      H/O colonoscopy      FAMILY HISTORY:  FH: kidney disease (Father)    FH: breast cancer (Mother)    FH: multiple myeloma (Mother)        Social History:  Tobacco: former smoker  Alcohol: occasional social  Drugs: none    Home Medications:  folic acid 1 mg oral tablet: 1 tab(s) orally once a day (30 May 2023 16:00)  furosemide 40 mg oral tablet: 1 tab(s) orally once a day (30 May 2023 16:00)  metoprolol tartrate 25 mg oral tablet: 1 tab(s) orally 2 times a day (30 May 2023 16:00)      MEDICATIONS  (STANDING):  chlorhexidine 2% Cloths 1 Application(s) Topical <User Schedule>  folic acid 1 milliGRAM(s) Oral daily  pantoprazole Infusion 8 mG/Hr (10 mL/Hr) IV Continuous <Continuous>    MEDICATIONS  (PRN):    Allergies  aspirin (Rash; Other)  EKG leads (Hives)  penicillins (Rash; Urticaria; Hives; Blisters)  latex (Unknown)      Review of Systems:   Constitutional:  No Fever, No Chills  ENT/Mouth:  No Hearing Changes,  No Difficulty Swallowing  Eyes:  No Eye Pain, No Vision Changes  Cardiovascular:  No Chest Pain, No Palpitations  Respiratory:  No Cough, No Dyspnea  Gastrointestinal:  As described in HPI  Musculoskeletal:  No Joint Swelling, No Back Pain  Skin:  No Skin Lesions, No Jaundice  Neuro:  No Syncope, No Dizziness  Heme/Lymph:  No Bruising, No Bleeding.      Physical Examination:  T(C): 36.8 (07-17-23 @ 07:36), Max: 36.8 (07-17-23 @ 07:36)  HR: 74 (07-17-23 @ 07:36) (66 - 86)  BP: 124/58 (07-17-23 @ 07:36) (82/53 - 125/82)  RR: 18 (07-17-23 @ 07:36) (18 - 18)  SpO2: 97% (07-17-23 @ 07:36) (97% - 97%)  Height (cm): 170.2 (07-16-23 @ 19:55)        GENERAL:  Appears stated age, morbidly obese  HEENT:  NC/AT,  conjunctivae clear and pink, no thyromegaly, nodules, adenopathy, sclera -anicteric  CHEST:  Full & symmetric excursion, no increased effort, breath sounds clear  HEART:  Regular rhythm, S1, S2, no murmur/rub/S3/S4, no abdominal bruit, no edema  ABDOMEN:  Soft, non-tender, non-distended, normoactive bowel sounds,  no masses ,no hepato-splenomegaly, no signs of chronic liver disease  EXTEREMITIES:  no cyanosis,clubbing or edema  SKIN:  No rash/erythema/ecchymoses/petechiae/wounds/abscess/warm/dry  NEURO:  Alert, oriented, no asterixis, no tremor, no encephalopathy      Data:                        6.7    3.73  )-----------( 156      ( 16 Jul 2023 20:30 )             22.2     Hgb Trend:  6.7  07-16-23 @ 20:30      07-16    141  |  113<H>  |  57<H>  ----------------------------<  111<H>  6.0<HH>   |  23  |  2.4<H>    Ca    7.8<L>      16 Jul 2023 20:30  Mg     2.2     07-16    TPro  5.1<L>  /  Alb  3.0<L>  /  TBili  0.6  /  DBili  x   /  AST  11  /  ALT  <5  /  AlkPhos  83  07-16    Liver panel trend:  TBili 0.6   /   AST 11   /   ALT <5   /   AlkP 83   /   Tptn 5.1   /   Alb 3.0    /   DBili --      07-16      PT/INR - ( 16 Jul 2023 20:30 )   PT: 13.00 sec;   INR: 1.14 ratio         PTT - ( 16 Jul 2023 20:30 )  PTT:30.5 sec        Radiology:  CT Angio Abdomen and Pelvis w/ IV Cont:   ACC: [id number] EXAM:  CT ANGIO ABD PELV (W)AW IC   ORDERED BY: SHAQ MOJICA     PROCEDURE DATE:  07/17/2023          INTERPRETATION:  INDICATION: Evaluation for gastrointestinal hemorrhage.   History of aortic stenosis with multiple gastrointestinal arteriovenous   malformations.    TECHNIQUE: Contiguous axial CT images were obtained from the lower chest   to the pubic symphysis before and following the administration of 100 cc   of Omnipaque 350 intravenous contrast in the arterial and venous phases.   Oral contrast was not administered. Reformatted images in the coronal and   sagittal planes were acquired. 3-D/MIP reformats were generated    CORRELATION/COMPARISON: CT abdomen pelvis 4/20/2023, CT abdomen pelvis   9/16/2013    FINDINGS:    LOWER CHEST: Unremarkable.    HEPATOBILIARY: Post-cholecystectomy. No focal liver mass. Patent portal   vein.    SPLEEN: Splenomegaly to 17 cm.    PANCREAS: Unremarkable.    ADRENAL GLANDS: 1.7 cm left adrenal nodule, stable since 2013.   Unremarkable right adrenal gland.    KIDNEYS: Left renal cysts. Symmetric renal enhancement. No hydronephrosis.    ABDOMINOPELVIC NODES: Prominent gastrohepatic lymph nodes, stable.    PELVIC ORGANS: Unremarkable.    PERITONEUM/MESENTERY/BOWEL: No evidence of bowel obstruction,   pneumoperitoneum, or ascites. The appendix is not definitively   visualized; no evidence of pericecal inflammatory changes. Colonic   diverticulosis without evidence of acute diverticulitis. Scattered   intraluminal linear hyperdensities of the duodenum and jejunum, some of   which appear new from prior exam 4/20/2023 and likely representing   endoclips. No evidence of intraluminal gastrointestinal contrast   extravasation to suggest hemorrhage. Splenorenal and perigastric varices   are noted.    BONES/SOFT TISSUES: Avascular necrosis of the right femoral head, stable   from 2013. Bony degenerative change. Chronic T12 compression deformity.      IMPRESSION:    No evidence of acute intra-abdominal or pelvic pathology.  No evidence of active gastrointestinal hemorrhage in the arterial or   venous phases.    --- End of Report ---    BRAD LEONARDO MD; Resident Radiologist  This document has been electronically signed.  SYLVIA CORNEJO MD; Attending Radiologist  This document has been electronically signed. Jul 17 2023  2:51AM (07-17-23 @ 01:25)       Gastroenterology Consultation:    Patient is a 77y old  Female who presents with a chief complaint of hematemesis (17 Jul 2023 04:29)        Admitted on: 07-17-23      HPI:  77F w/ h/o chronic anemia requiring transfusions (has a port, follows Dr. 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 GI consulted for acute anemia and reported coffee ground emesis.     Symptoms started yesterday. Patient was in usual state of health when she felt some mid abdominal pain. After this, she had two episodes of vomiting with some clots. Abdominal pain and nausea have improved since vomiting, but still persist. Has BM's and able to pass flatus as of yesterday. No bloody BM's or melena noted. Despite multiple admissions related to GI bleeds, pt reports never experiencing hematemesis before. No reported AC or AP use. No reported fevers, chills, CP, palpitations, SOB, dysuria, or LE swelling.    In ED, pt hypotensive (BP 82/53) on vitals. Labs demonstrated Hb 6.7 (Hb 8.5 two weeks ago) and K 6.0. EKG demonstrated prolonged QTc (513ms), but no acute changes a/w hyperkalemia. CTA AP negative for active GI bleed.  S/P 1L LR bolus, 2u pRBC, pantoprazole 80mg IV plus gtt (8mg/hr) insulin 10u IV, D50% 25g IV, calcium gluconate 2g IV, Mg 2g IV, Reglan 10mg IV, and Zofran 4mg IV. Repeat /82 after fluids and pRBC's. Admitted to MICU      Medication use: Denies any use of NSAIDs, C/S, ASA, EtOh and Antithrombotics or herbal remedies like Gingko, Garlic, and Ginseng      Prior EGD:  10/19:  by Dr. Robles, for anemia   Esophagitis compatible with non-erosive esophagitis.    Erythema and congestion in the stomach.    Erythema and congestion in the duodenum compatible with duodenitis.     2020: by  for anemia     Normal mucosa in the whole esophagus.    Erythema, congestion and erosive in the antrum. (Biopsy: negative HP).    Erythema in the duodenum compatible with duodenitis.    -prominent brunners glands.     Plan: Await pathology results   capsule endoscopy today/repeat colonoscopy as outpatient    2020: SB enteroscopy Bleeding on VCE; by Dr. Sean Powell'  Normal esophagus.    Mosaic pattern mucosa was not in the fundus/body suggestive of possible  underlying PHG. Biopsy taken.    Erythema noted in the antrum with possible underlying gastritis. Biopsy taken.    The bulbar mucosa was nodular. This could be due to underlying lymphoid  hyperplasia. Biopsies taken.    The single balloon assisted enteroscope was advanced to approximately to the  mid jejunum until no advancement was possible. At this point a 10ml vera ink  was injected to jackelyn to extent of this exam. No source of active bleeding or  stigmata of recent bleeding was identified. .    The enteroscope was exchanged for the conventional EGD scope for better  assessment of the esophagus stomach and duodenum. .     2022: by Dr. Perez Enteroscopy     Normal mucosa in the whole esophagus.    Angioectasias in the antrum. (Hemostasis).    Multiple small AVMs were noted in duodenal bulb and second part of duodenum.  APCs were applied for the purpose of hemostatsis.    PCF scope for enteroscopy. Proximal jejunum examined. No blood was noted in  jejunum. 2 small non-bleeding AVMs were noted in proximal jejunum. APCs were  applied for the purpose of hemostasis.    4/2023: Kenny for anemia   Normal mucosa in the whole esophagus.    Normal mucosa in the whole stomach.    -The bulbar mucosa was nodular. This could be due to underlying lymphoid  hyperplasia. Biopsies taken. .    -An oozing Dieulafoy lesion was seen in the Third part of the duodenum. Three  endoclips were applied to the Third part of the duodenum for the purpose of  hemostasis. .    Abnormal mucosa in the Jejunum (120 cm from incisors).    6/2023: Enteroscopy     Normal esophagus.    Normal stomach.    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    The previously placed clips in the second portion of the duodenum were seen  with an adjacent actively oozing AVM that was ablated using APC, followed by  placement of 2 endoclips.    A previously placed clip was seen in the proximal jejunum.    The scope was advanced to the proximal jejunum to around 150 cm from the  incisors and 4 AVMs were seen throughout the proximal jejunum. 3 AVMs were  treated with APC and placement of 1 endoclip (total 3). One AVM was treated with  APC only.      Prior Colonoscopy:  10/2019: negative EGD, anemia workup by ; good prep : moderate diverticulosis in SC, DC, cecum; internal hemorrhoids      PAST MEDICAL & SURGICAL HISTORY:  HTN (hypertension)      Heart murmur      Eczema      Anemia  iron infusions and PRBC transfusions      Aortic stenosis      Chronic kidney disease (CKD)      2019 novel coronavirus disease (COVID-19)  4/2020- [facility] admission    Gastric AVM      H/O appendicitis      S/P cholecystectomy      History of esophagogastroduodenoscopy (EGD)      H/O colonoscopy      FAMILY HISTORY:  FH: kidney disease (Father)    FH: breast cancer (Mother)    FH: multiple myeloma (Mother)        Social History:  Tobacco: former smoker  Alcohol: occasional social  Drugs: none    Home Medications:  folic acid 1 mg oral tablet: 1 tab(s) orally once a day (30 May 2023 16:00)  furosemide 40 mg oral tablet: 1 tab(s) orally once a day (30 May 2023 16:00)  metoprolol tartrate 25 mg oral tablet: 1 tab(s) orally 2 times a day (30 May 2023 16:00)      MEDICATIONS  (STANDING):  chlorhexidine 2% Cloths 1 Application(s) Topical <User Schedule>  folic acid 1 milliGRAM(s) Oral daily  pantoprazole Infusion 8 mG/Hr (10 mL/Hr) IV Continuous <Continuous>    MEDICATIONS  (PRN):    Allergies  aspirin (Rash; Other)  EKG leads (Hives)  penicillins (Rash; Urticaria; Hives; Blisters)  latex (Unknown)      Review of Systems:   Constitutional:  No Fever, No Chills  ENT/Mouth:  No Hearing Changes,  No Difficulty Swallowing  Eyes:  No Eye Pain, No Vision Changes  Cardiovascular:  No Chest Pain, No Palpitations  Respiratory:  No Cough, No Dyspnea  Gastrointestinal:  As described in HPI  Musculoskeletal:  No Joint Swelling, No Back Pain  Skin:  No Skin Lesions, No Jaundice  Neuro:  No Syncope, No Dizziness  Heme/Lymph:  No Bruising, No Bleeding.      Physical Examination:  T(C): 36.8 (07-17-23 @ 07:36), Max: 36.8 (07-17-23 @ 07:36)  HR: 74 (07-17-23 @ 07:36) (66 - 86)  BP: 124/58 (07-17-23 @ 07:36) (82/53 - 125/82)  RR: 18 (07-17-23 @ 07:36) (18 - 18)  SpO2: 97% (07-17-23 @ 07:36) (97% - 97%)  Height (cm): 170.2 (07-16-23 @ 19:55)        GENERAL:  Appears stated age, morbidly obese  HEENT:  NC/AT,  conjunctivae clear and pink, no thyromegaly, nodules, adenopathy, sclera -anicteric  CHEST:  Full & symmetric excursion, no increased effort, breath sounds clear  HEART:  Regular rhythm, S1, S2, AS murmur,  no abdominal bruit, no edema  ABDOMEN:  Soft, non-tender, non-distended, normoactive bowel sounds,  no masses ,no hepato-splenomegaly, no signs of chronic liver disease  EXTEREMITIES:  no cyanosis, clubbing or edema  SKIN:  No rash/erythema/ecchymoses/petechiae/wounds/abscess/warm/dry  NEURO:  Alert, oriented, no asterixis, no tremor, no encephalopathy      Data:                        6.7    3.73  )-----------( 156      ( 16 Jul 2023 20:30 )             22.2     Hgb Trend:  6.7  07-16-23 @ 20:30      07-16    141  |  113<H>  |  57<H>  ----------------------------<  111<H>  6.0<HH>   |  23  |  2.4<H>    Ca    7.8<L>      16 Jul 2023 20:30  Mg     2.2     07-16    TPro  5.1<L>  /  Alb  3.0<L>  /  TBili  0.6  /  DBili  x   /  AST  11  /  ALT  <5  /  AlkPhos  83  07-16    Liver panel trend:  TBili 0.6   /   AST 11   /   ALT <5   /   AlkP 83   /   Tptn 5.1   /   Alb 3.0    /   DBili --      07-16      PT/INR - ( 16 Jul 2023 20:30 )   PT: 13.00 sec;   INR: 1.14 ratio         PTT - ( 16 Jul 2023 20:30 )  PTT:30.5 sec        Radiology:  CT Angio Abdomen and Pelvis w/ IV Cont:   ACC: [id number] EXAM:  CT ANGIO ABD PELV (W)AW IC   ORDERED BY: SHAQ MOJICA     PROCEDURE DATE:  07/17/2023          INTERPRETATION:  INDICATION: Evaluation for gastrointestinal hemorrhage.   History of aortic stenosis with multiple gastrointestinal arteriovenous   malformations.    TECHNIQUE: Contiguous axial CT images were obtained from the lower chest   to the pubic symphysis before and following the administration of 100 cc   of Omnipaque 350 intravenous contrast in the arterial and venous phases.   Oral contrast was not administered. Reformatted images in the coronal and   sagittal planes were acquired. 3-D/MIP reformats were generated    CORRELATION/COMPARISON: CT abdomen pelvis 4/20/2023, CT abdomen pelvis   9/16/2013    FINDINGS:    LOWER CHEST: Unremarkable.    HEPATOBILIARY: Post-cholecystectomy. No focal liver mass. Patent portal   vein.    SPLEEN: Splenomegaly to 17 cm.    PANCREAS: Unremarkable.    ADRENAL GLANDS: 1.7 cm left adrenal nodule, stable since 2013.   Unremarkable right adrenal gland.    KIDNEYS: Left renal cysts. Symmetric renal enhancement. No hydronephrosis.    ABDOMINOPELVIC NODES: Prominent gastrohepatic lymph nodes, stable.    PELVIC ORGANS: Unremarkable.    PERITONEUM/MESENTERY/BOWEL: No evidence of bowel obstruction,   pneumoperitoneum, or ascites. The appendix is not definitively   visualized; no evidence of pericecal inflammatory changes. Colonic   diverticulosis without evidence of acute diverticulitis. Scattered   intraluminal linear hyperdensities of the duodenum and jejunum, some of   which appear new from prior exam 4/20/2023 and likely representing   endoclips. No evidence of intraluminal gastrointestinal contrast   extravasation to suggest hemorrhage. Splenorenal and perigastric varices   are noted.    BONES/SOFT TISSUES: Avascular necrosis of the right femoral head, stable   from 2013. Bony degenerative change. Chronic T12 compression deformity.      IMPRESSION:    No evidence of acute intra-abdominal or pelvic pathology.  No evidence of active gastrointestinal hemorrhage in the arterial or   venous phases.    --- End of Report ---    BRAD LEONARDO MD; Resident Radiologist  This document has been electronically signed.  SYLVIA CORNEJO MD; Attending Radiologist  This document has been electronically signed. Jul 17 2023  2:51AM (07-17-23 @ 01:25)

## 2023-07-17 NOTE — H&P ADULT - NSICDXFAMILYHX_GEN_ALL_CORE_FT
FAMILY HISTORY:  Father  Still living? Unknown  FH: kidney disease, Age at diagnosis: Age Unknown    Mother  Still living? Unknown  FH: breast cancer, Age at diagnosis: Age Unknown  FH: multiple myeloma, Age at diagnosis: Age Unknown

## 2023-07-17 NOTE — H&P ADULT - NSHPLABSRESULTS_GEN_ALL_CORE
CT Angio Abdomen and Pelvis w/ IV Cont (07.17.23 @ 01:25)  1. No evidence of acute intra-abdominal or pelvic pathology  2. No evidence of active gastrointestinal hemorrhage in the arterial or venous phases

## 2023-07-17 NOTE — CONSULT NOTE ADULT - NSCONSULTADDITIONALINFOA_GEN_ALL_CORE
High risk patient with GI bleed requiring PPI infusion and multiple units of PRBCs with intensive monitoring

## 2023-07-17 NOTE — H&P ADULT - NSHPSOCIALHISTORY_GEN_ALL_CORE
. retired. Lives in apartment w/ son's family. Able to perform all ADL's and most IADL's independently. Ambulates w/o mechanical assistive device inside home, but use walker/cane for longer distances.

## 2023-07-17 NOTE — H&P ADULT - ASSESSMENT
IMPRESSION  #Upper GI Bleed  #Acute on Chronic Anemia  #Severe AS  #CKD    PLAN    CNS  - Avoid sedatives    HEENT  - Oral care    PULMONARY  - HOB at 45  - Aspiration precautions    CARDIOVASCULAR  - hold BB and furosemide i/s/o GI bleed  - avoid volume overload    GI  - Diet: NPO  - GI ppx: pantoprazole gtt @ 8mg/hr  - GI consulted; f/u recs    RENAL  - strict I&Os, daily weight, and monitor volume status  - Follow up renal function and lytes, correct as needed (K>4; Mg>2)    INFECTIOUS DISEASE  - no signs of infection  - monitor VS    HEMATOLOGICAL  - DVT ppx: SCD's (no chemoprophylaxis i/s/o GI bleed)    ENDOCRINE  - Monitor FS  - insulin protocol if needed    MUSCULOSKELETAL  - Activity: Increase as tolerated    DISPOSITION: MICU IMPRESSION  #Upper GI Bleed  #Acute on Chronic Anemia  #Severe AS  #CKD/ Hyperk    PLAN    CNS  - Avoid sedatives    HEENT  - Oral care    PULMONARY  - HOB at 45  - Aspiration precautions    CARDIOVASCULAR  - hold BB and furosemide i/s/o GI bleed  - avoid volume overload    GI  - Diet: NPO  - GI ppx: pantoprazole gtt @ 8mg/hr  - GI consulted; f/u recs    RENAL  - strict I&Os, daily weight, and monitor volume status  - Follow up renal function and lytes, correct as needed (K>4; Mg>2)    INFECTIOUS DISEASE  - no signs of infection  - monitor VS    HEMATOLOGICAL  - DVT ppx: SCD's (no chemoprophylaxis i/s/o GI bleed)    ENDOCRINE  - Monitor FS  - insulin protocol if needed    MUSCULOSKELETAL  - Activity: Increase as tolerated    DISPOSITION: MICU

## 2023-07-17 NOTE — H&P ADULT - HISTORY OF PRESENT ILLNESS
77F w/ h/o prior GI bleed (due to AVM's and duodenal ulcers), severe AS, and CKD p/w hematemesis. Symptoms started yesterday. Patient was in usual state of health when she felt sudden, sharp, lower abdominal pain. After this, pt had multiple episodes of hematemesis (reportedly ~2 pints over 2 hours), thereby prompting ED evaluation. Abdominal pain and nausea have improved since vomiting, but still persist. Has BM's and able to pass flatus as of yesterday. No bloody BM's or melena noted. Despite multiple admissions related to GI bleeds, pt reports never experiencing hematemesis before. No reported AC or AP use. No reported fevers, chills, CP, palpitations, SOB, dysuria, or LE swelling.    In ED, pt hypotensive (BP 82/53) on vitals. Labs demonstrated Hb 6.7 (Hb 8.5 two weeks ago) and K 6.0. EKG demonstrated prolonged QTc (513ms), but no acute changes a/w hyperkalemia. CTA AP negative for active GI bleed. GI consulted and recommended admission to MICU w/ plan for EGD in AM. S/P 1L LR bolus, 2u pRBC, pantoprazole 80mg IV plus gtt (8mg/hr) insulin 10u IV, D50% 25g IV, calcium gluconate 2g IV, Mg 2g IV, Reglan 10mg IV, and Zofran 4mg IV. Repeat /82 after fluids and pRBC's. Admitted to MICU for upper GI bleed.

## 2023-07-17 NOTE — PROGRESS NOTE ADULT - ASSESSMENT
77F w/ h/o chronic anemia requiring transfusions (has a port, follows Dr. 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 GI consulted for acute anemia and hematemesis. Patient was hypotensive on admission.    #?coffee ground emesis likely d/t AVM vs Erosive Hemorrhagic - resolved  #Hx of prior GI bleed with AVMs in stomach , duodenum, dieulafoy lesion in D3   - Hemodynamically stable & no active bleeding  - Baseline hemoglobin - 8  - Hemoglobin on admission - 6.7 (6/1 Hb was 8.5)>2u >8.9  - BUN /Cr: 57/2.4 ( as of June: 58/2.8)  -potassium level 6.1, (asymptomatic) started on Lokelma and given stat dose of calcium gluocnate  - Coags - 1.14 INR  - not on any  Antithrombotic or  Antithrombotic   - on PPI drip  - CT Angio AP: no active bleed  - s/p EGDx2, Colonoscopy x1, VCE x2, enteroscopyx4 most recent in 6/23 by  for anemia    - patient has severe AS, seen by cardiology

## 2023-07-17 NOTE — CONSULT NOTE ADULT - ASSESSMENT
77 year old woman with history of chronic anemia with recurrent transfusions, history of GI bleed 2/2 AVM, severe AS pending TAVR, CKD presents with hematemesis.  Palliative care consulted for GOC.    Education about palliative care provided to patient/family.  See Recs below.    Please call [phone] with questions or concerns 24/7.   We will continue to follow.

## 2023-07-17 NOTE — CONSULT NOTE ADULT - ASSESSMENT
No angina or ACS   No evidence of unstable arrhythmia   No ADHF  Severe Aortic stenosis   Heyde Syndrome    Procedure: EGD - Not high risk procedure  RCRI 2-> 6.6% risk of perioperative MACE  METS < 4      Recommendations   - Patient is currently optimized for procedure   - No further cardiac workup   - High risk for perioperative MACE  - This consult serves only as a estuardo-operative cardiac risk stratification and evaluation to predict 30-days cardiac complications risk and mortality. The decision to proceed with the surgery/procedure is made by the performing physician and the patient -   No angina or ACS   No evidence of unstable arrhythmia   No ADHF  Severe Aortic stenosis   Heyde Syndrome    Procedure: EGD - low risk procedure  RCRI 2-> 6.6% risk of perioperative MACE  METS < 4      Recommendations   - Patient is currently optimized for procedure   - No further cardiac workup   - High risk for perioperative MACE  - This consult serves only as a estuardo-operative cardiac risk stratification and evaluation to predict 30-days cardiac complications risk and mortality. The decision to proceed with the surgery/procedure is made by the performing physician and the patient -   No angina or ACS   No evidence of unstable arrhythmia   No ADHF  Severe Aortic stenosis   Heyde Syndrome    Procedure: EGD - low risk procedure  RCRI 2-> 6.6% risk of perioperative MACE  METS < 4      Recommendations   - Patient is currently optimized for procedure   - No further cardiac workup   - High risk for perioperative MACE  - This consult serves only as a estuardo-operative cardiac risk stratification and evaluation to predict 30-days cardiac complications risk and mortality. The decision to proceed with the surgery/procedure is made by the performing physician and the patient -  - Hemodynamic monitoring intra- and post-op  - avoid hypotension and vasodilators

## 2023-07-17 NOTE — ED ADULT NURSE NOTE - HIV OFFER
Opt out
PROBLEM DIAGNOSES  Problem: Other and unspecified ventral hernia with obstruction, without gangrene  Assessment and Plan: robotic assisted laparoscopic ventral hernia repair with mesh possible open    Problem: Need for prophylactic measure  Assessment and Plan: The Caprini score indicates this patient is at risk for a VTE event (score 3-5).  Most surgical patients in this group would benefit from pharmacologic prophylaxis.  The surgical team will determine the balance between VTE risk and bleeding risk

## 2023-07-17 NOTE — CONSULT NOTE ADULT - ASSESSMENT
77F w/ h/o chronic anemia requiring transfusions (has a port, follows Dr. 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 GI consulted for acute anemia and hematemesis     #?Heme 77F w/ h/o chronic anemia requiring transfusions (has a port, follows Dr. 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 GI consulted for acute anemia and hematemesis. Patient was hypotensive on admission.    #?Hematemesis likely d/t AVM vs Erosive Hemorrhagic Gastritis vs Dieulafoy lesion Vs Malignancy - resolved  #Hx of prior GI bleed with AVMs in stomach , duodenum, dieulafoy lesion in D3  - Hemodynamically stable & no active bleeding  - Baseline hemoglobin - 8  - Hemoglobin on admission - 6.7 (6/1 Hb was 8.5)  - BUN /Cr: 57/2.4 ( as of June: 58/2.8)  - Coags - 1.14 INR  - not on any  Antithrombotic or  Antithrombotic   - on PPI drip  - CT Angio AP: no active bleed  - s/p EGDx2, Colonoscopy x1, VCE x2, enteroscopyx4 most recent in 6/23 by  for anemia and AVMs: active oozing AVMs in D2 s/p APC +clips , 4 AVMs      PLAN:   - Keep NPO   - Maintain active Type and screen  - please get cardiology risk stratification  - Trend H&H BID  - Please place x2 18G IVs  - Please start IV fluids (SBP >90)   - Please start pantoprazole 40 IV BID  - Please give IV erythromycin 250mg 30 minutes before procedure  - will plan for EGD on  - Please correct electrolytes (Target Na 135-145, Mg 1.7-2.2, K 3.5-5)  - Please correct INR to <1.5  - Please target Hb  >7 or >9   - Please avoid any NSAIDs  - NPO after midnight for procedure  - If any unstable bleed, please call GI stat    Communicated with Primary team member:  77F w/ h/o chronic anemia requiring transfusions (has a port, follows Dr. 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 GI consulted for acute anemia and hematemesis. Patient was hypotensive on admission.    #?coffee ground emesis likely d/t AVM vs Erosive Hemorrhagic Gastritis vs Dieulafoy lesion Vs Malignancy - resolved  #Hx of prior GI bleed with AVMs in stomach , duodenum, dieulafoy lesion in D3   - Hemodynamically stable & no active bleeding  - Baseline hemoglobin - 8  - Hemoglobin on admission - 6.7 (6/1 Hb was 8.5)>2u >8.9  - BUN /Cr: 57/2.4 ( as of June: 58/2.8)  - Coags - 1.14 INR  - not on any  Antithrombotic or  Antithrombotic   - on PPI drip  - CT Angio AP: no active bleed  - s/p EGDx2, Colonoscopy x1, VCE x2, enteroscopyx4 most recent in 6/23 by  for anemia and AVMs: active oozing AVMs in D2 s/p APC +clips , 4 AVMs   - patient has severe AS pending workup     PLAN:   - given multiple endoscopic interventions in the past for AVMs, risks outweight benefits for any endoscopic intervention. DIscussed with patient and ICU team, conservative management for now unless overt GI bleeding  - Maintain active Type and screen  - please get cardiology risk stratification and structural for workup  - Trend H&H BID  - Please place x2 18G IVs  - Please avoid any NSAIDs       77F w/ h/o chronic anemia requiring transfusions (has a port, follows Dr. 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 GI consulted for acute anemia and coffee ground emesis. Patient was hypotensive on admission improved post transfusion.    #?coffee ground emesis likely d/t AVM vs Erosive Hemorrhagic Gastritis vs Dieulafoy lesion Vs Malignancy - resolved  #Acute on chronic anemia s/p 2u pRBC   #Hx of prior GI bleed with AVMs in stomach , duodenum, dieulafoy lesion in D3   - Hemodynamically stable & no active bleeding, no further coffee ground emesis  - Baseline hemoglobin - 8  - Hemoglobin on admission - 6.7 (6/1 Hb was 8.5)>2u >8.9  - BUN /Cr: 57/2.4 ( as of June: 58/2.8)  - Coags - 1.14 INR  - not on any  Antithrombotic or  Antithrombotic   - on PPI drip  - CT Angio AP: no active bleed  - s/p EGDx2, Colonoscopy x1, VCE x2, enteroscopyx4 most recent in 6/23 by  for anemia and AVMs: active oozing AVMs in D2 s/p APC +clips , 4 AVMs   - patient has severe AS pending workup     PLAN:   - given multiple endoscopic interventions in the past for AVMs, risks outweigh benefits for any endoscopic intervention in the patient condition with severe AS, morbid obesity. Discussed with patient and ICU team, conservative management for now unless overt GI bleeding  - Maintain active Type and screen  - cardiology work up for Severe AS given multiple AVM bleed  - can change PPI drip to oral Once daily  - Trend H&H  - Please place x2 18G IVs  - Please avoid any NSAIDs    Discussed with primary team

## 2023-07-17 NOTE — CONSULT NOTE ADULT - PROBLEM SELECTOR RECOMMENDATION 9
Coffee ground emesis. History of AVM. S/p 2u PRBC. s/p multiple previous EGD and colonoscopy, and other intervention. CT angio without active bleed.  -continue PPI infusion  -s/p cardiac risk stratification  -trend H+H  -f/u GI for possible intervention  -treatment of nausea per GI/primary team

## 2023-07-17 NOTE — H&P ADULT - NSHPOUTPATIENTPROVIDERS_GEN_ALL_CORE
PCP - Dr. Bill  GI - Dr. Sean Powell  Heme/Onc - Dr. Gaston  Cardiology - Dr. Munir Paula  Structural Heart Disease - Dr. Hernandez

## 2023-07-17 NOTE — H&P ADULT - NSICDXPASTMEDICALHX_GEN_ALL_CORE_FT
PAST MEDICAL HISTORY:  2019 novel coronavirus disease (COVID-19) 4/2020- REHAB admission    Anemia iron infusions and PRBC transfusions    Aortic stenosis     Chronic kidney disease (CKD)     Eczema     Gastric AVM     Heart murmur     HTN (hypertension)

## 2023-07-17 NOTE — H&P ADULT - NSICDXPASTSURGICALHX_GEN_ALL_CORE_FT
PAST SURGICAL HISTORY:  H/O appendicitis     H/O colonoscopy     History of esophagogastroduodenoscopy (EGD)     S/P cholecystectomy

## 2023-07-18 LAB
ALBUMIN SERPL ELPH-MCNC: 3 G/DL — LOW (ref 3.5–5.2)
ALBUMIN SERPL ELPH-MCNC: 3.1 G/DL — LOW (ref 3.5–5.2)
ALP SERPL-CCNC: 71 U/L — SIGNIFICANT CHANGE UP (ref 30–115)
ALP SERPL-CCNC: 74 U/L — SIGNIFICANT CHANGE UP (ref 30–115)
ALT FLD-CCNC: 5 U/L — SIGNIFICANT CHANGE UP (ref 0–41)
ALT FLD-CCNC: 6 U/L — SIGNIFICANT CHANGE UP (ref 0–41)
ANION GAP SERPL CALC-SCNC: 8 MMOL/L — SIGNIFICANT CHANGE UP (ref 7–14)
ANION GAP SERPL CALC-SCNC: 9 MMOL/L — SIGNIFICANT CHANGE UP (ref 7–14)
APTT BLD: 30.9 SEC — SIGNIFICANT CHANGE UP (ref 27–39.2)
AST SERPL-CCNC: 10 U/L — SIGNIFICANT CHANGE UP (ref 0–41)
AST SERPL-CCNC: 11 U/L — SIGNIFICANT CHANGE UP (ref 0–41)
BASOPHILS # BLD AUTO: 0.01 K/UL — SIGNIFICANT CHANGE UP (ref 0–0.2)
BASOPHILS NFR BLD AUTO: 0.2 % — SIGNIFICANT CHANGE UP (ref 0–1)
BILIRUB SERPL-MCNC: 1.1 MG/DL — SIGNIFICANT CHANGE UP (ref 0.2–1.2)
BILIRUB SERPL-MCNC: 1.2 MG/DL — SIGNIFICANT CHANGE UP (ref 0.2–1.2)
BLD GP AB SCN SERPL QL: SIGNIFICANT CHANGE UP
BUN SERPL-MCNC: 70 MG/DL — CRITICAL HIGH (ref 10–20)
BUN SERPL-MCNC: 74 MG/DL — CRITICAL HIGH (ref 10–20)
CALCIUM SERPL-MCNC: 8.4 MG/DL — SIGNIFICANT CHANGE UP (ref 8.4–10.5)
CALCIUM SERPL-MCNC: 8.7 MG/DL — SIGNIFICANT CHANGE UP (ref 8.4–10.5)
CHLORIDE SERPL-SCNC: 112 MMOL/L — HIGH (ref 98–110)
CHLORIDE SERPL-SCNC: 115 MMOL/L — HIGH (ref 98–110)
CO2 SERPL-SCNC: 19 MMOL/L — SIGNIFICANT CHANGE UP (ref 17–32)
CO2 SERPL-SCNC: 20 MMOL/L — SIGNIFICANT CHANGE UP (ref 17–32)
CREAT SERPL-MCNC: 2.6 MG/DL — HIGH (ref 0.7–1.5)
CREAT SERPL-MCNC: 2.6 MG/DL — HIGH (ref 0.7–1.5)
EGFR: 18 ML/MIN/1.73M2 — LOW
EGFR: 18 ML/MIN/1.73M2 — LOW
EOSINOPHIL # BLD AUTO: 0.2 K/UL — SIGNIFICANT CHANGE UP (ref 0–0.7)
EOSINOPHIL # BLD AUTO: 0.23 K/UL — SIGNIFICANT CHANGE UP (ref 0–0.7)
EOSINOPHIL # BLD AUTO: 0.33 K/UL — SIGNIFICANT CHANGE UP (ref 0–0.7)
EOSINOPHIL NFR BLD AUTO: 4.7 % — SIGNIFICANT CHANGE UP (ref 0–8)
EOSINOPHIL NFR BLD AUTO: 5.4 % — SIGNIFICANT CHANGE UP (ref 0–8)
EOSINOPHIL NFR BLD AUTO: 7.2 % — SIGNIFICANT CHANGE UP (ref 0–8)
GLUCOSE SERPL-MCNC: 83 MG/DL — SIGNIFICANT CHANGE UP (ref 70–99)
GLUCOSE SERPL-MCNC: 83 MG/DL — SIGNIFICANT CHANGE UP (ref 70–99)
HCT VFR BLD CALC: 24.1 % — LOW (ref 37–47)
HCT VFR BLD CALC: 24.1 % — LOW (ref 37–47)
HCT VFR BLD CALC: 24.4 % — LOW (ref 37–47)
HGB BLD-MCNC: 7.6 G/DL — LOW (ref 12–16)
HGB BLD-MCNC: 7.7 G/DL — LOW (ref 12–16)
HGB BLD-MCNC: 7.8 G/DL — LOW (ref 12–16)
IMM GRANULOCYTES NFR BLD AUTO: 0.2 % — SIGNIFICANT CHANGE UP (ref 0.1–0.3)
IMM GRANULOCYTES NFR BLD AUTO: 0.5 % — HIGH (ref 0.1–0.3)
IMM GRANULOCYTES NFR BLD AUTO: 0.5 % — HIGH (ref 0.1–0.3)
INR BLD: 1.03 RATIO — SIGNIFICANT CHANGE UP (ref 0.65–1.3)
LYMPHOCYTES # BLD AUTO: 0.54 K/UL — LOW (ref 1.2–3.4)
LYMPHOCYTES # BLD AUTO: 0.59 K/UL — LOW (ref 1.2–3.4)
LYMPHOCYTES # BLD AUTO: 0.59 K/UL — LOW (ref 1.2–3.4)
LYMPHOCYTES # BLD AUTO: 12.7 % — LOW (ref 20.5–51.1)
LYMPHOCYTES # BLD AUTO: 12.8 % — LOW (ref 20.5–51.1)
LYMPHOCYTES # BLD AUTO: 13.9 % — LOW (ref 20.5–51.1)
MAGNESIUM SERPL-MCNC: 2.4 MG/DL — SIGNIFICANT CHANGE UP (ref 1.8–2.4)
MCHC RBC-ENTMCNC: 29.6 PG — SIGNIFICANT CHANGE UP (ref 27–31)
MCHC RBC-ENTMCNC: 29.9 PG — SIGNIFICANT CHANGE UP (ref 27–31)
MCHC RBC-ENTMCNC: 30 PG — SIGNIFICANT CHANGE UP (ref 27–31)
MCHC RBC-ENTMCNC: 31.5 G/DL — LOW (ref 32–37)
MCHC RBC-ENTMCNC: 32 G/DL — SIGNIFICANT CHANGE UP (ref 32–37)
MCHC RBC-ENTMCNC: 32 G/DL — SIGNIFICANT CHANGE UP (ref 32–37)
MCV RBC AUTO: 93.5 FL — SIGNIFICANT CHANGE UP (ref 81–99)
MCV RBC AUTO: 93.8 FL — SIGNIFICANT CHANGE UP (ref 81–99)
MCV RBC AUTO: 93.8 FL — SIGNIFICANT CHANGE UP (ref 81–99)
MONOCYTES # BLD AUTO: 0.22 K/UL — SIGNIFICANT CHANGE UP (ref 0.1–0.6)
MONOCYTES # BLD AUTO: 0.22 K/UL — SIGNIFICANT CHANGE UP (ref 0.1–0.6)
MONOCYTES # BLD AUTO: 0.28 K/UL — SIGNIFICANT CHANGE UP (ref 0.1–0.6)
MONOCYTES NFR BLD AUTO: 5.2 % — SIGNIFICANT CHANGE UP (ref 1.7–9.3)
MONOCYTES NFR BLD AUTO: 5.2 % — SIGNIFICANT CHANGE UP (ref 1.7–9.3)
MONOCYTES NFR BLD AUTO: 6.1 % — SIGNIFICANT CHANGE UP (ref 1.7–9.3)
NEUTROPHILS # BLD AUTO: 3.16 K/UL — SIGNIFICANT CHANGE UP (ref 1.4–6.5)
NEUTROPHILS # BLD AUTO: 3.26 K/UL — SIGNIFICANT CHANGE UP (ref 1.4–6.5)
NEUTROPHILS # BLD AUTO: 3.39 K/UL — SIGNIFICANT CHANGE UP (ref 1.4–6.5)
NEUTROPHILS NFR BLD AUTO: 73.5 % — SIGNIFICANT CHANGE UP (ref 42.2–75.2)
NEUTROPHILS NFR BLD AUTO: 74.8 % — SIGNIFICANT CHANGE UP (ref 42.2–75.2)
NEUTROPHILS NFR BLD AUTO: 76.7 % — HIGH (ref 42.2–75.2)
NRBC # BLD: 0 /100 WBCS — SIGNIFICANT CHANGE UP (ref 0–0)
PLATELET # BLD AUTO: 91 K/UL — LOW (ref 130–400)
PLATELET # BLD AUTO: 92 K/UL — LOW (ref 130–400)
PLATELET # BLD AUTO: 99 K/UL — LOW (ref 130–400)
PMV BLD: 10.3 FL — SIGNIFICANT CHANGE UP (ref 7.4–10.4)
PMV BLD: 10.5 FL — HIGH (ref 7.4–10.4)
PMV BLD: 10.6 FL — HIGH (ref 7.4–10.4)
POTASSIUM SERPL-MCNC: 4.9 MMOL/L — SIGNIFICANT CHANGE UP (ref 3.5–5)
POTASSIUM SERPL-MCNC: 5 MMOL/L — SIGNIFICANT CHANGE UP (ref 3.5–5)
POTASSIUM SERPL-SCNC: 4.9 MMOL/L — SIGNIFICANT CHANGE UP (ref 3.5–5)
POTASSIUM SERPL-SCNC: 5 MMOL/L — SIGNIFICANT CHANGE UP (ref 3.5–5)
PROT SERPL-MCNC: 4.9 G/DL — LOW (ref 6–8)
PROT SERPL-MCNC: 4.9 G/DL — LOW (ref 6–8)
PROTHROM AB SERPL-ACNC: 11.7 SEC — SIGNIFICANT CHANGE UP (ref 9.95–12.87)
RBC # BLD: 2.57 M/UL — LOW (ref 4.2–5.4)
RBC # BLD: 2.57 M/UL — LOW (ref 4.2–5.4)
RBC # BLD: 2.61 M/UL — LOW (ref 4.2–5.4)
RBC # FLD: 17.6 % — HIGH (ref 11.5–14.5)
RBC # FLD: 17.8 % — HIGH (ref 11.5–14.5)
RBC # FLD: 17.9 % — HIGH (ref 11.5–14.5)
SODIUM SERPL-SCNC: 140 MMOL/L — SIGNIFICANT CHANGE UP (ref 135–146)
SODIUM SERPL-SCNC: 143 MMOL/L — SIGNIFICANT CHANGE UP (ref 135–146)
WBC # BLD: 4.23 K/UL — LOW (ref 4.8–10.8)
WBC # BLD: 4.25 K/UL — LOW (ref 4.8–10.8)
WBC # BLD: 4.61 K/UL — LOW (ref 4.8–10.8)
WBC # FLD AUTO: 4.23 K/UL — LOW (ref 4.8–10.8)
WBC # FLD AUTO: 4.25 K/UL — LOW (ref 4.8–10.8)
WBC # FLD AUTO: 4.61 K/UL — LOW (ref 4.8–10.8)

## 2023-07-18 PROCEDURE — 99233 SBSQ HOSP IP/OBS HIGH 50: CPT

## 2023-07-18 PROCEDURE — 99232 SBSQ HOSP IP/OBS MODERATE 35: CPT

## 2023-07-18 PROCEDURE — 93010 ELECTROCARDIOGRAM REPORT: CPT

## 2023-07-18 RX ORDER — PANTOPRAZOLE SODIUM 20 MG/1
40 TABLET, DELAYED RELEASE ORAL EVERY 12 HOURS
Refills: 0 | Status: DISCONTINUED | OUTPATIENT
Start: 2023-07-18 | End: 2023-07-19

## 2023-07-18 RX ORDER — DIPHENHYDRAMINE HCL 50 MG
25 CAPSULE ORAL EVERY 12 HOURS
Refills: 0 | Status: DISCONTINUED | OUTPATIENT
Start: 2023-07-18 | End: 2023-07-20

## 2023-07-18 RX ADMIN — PANTOPRAZOLE SODIUM 40 MILLIGRAM(S): 20 TABLET, DELAYED RELEASE ORAL at 18:00

## 2023-07-18 RX ADMIN — SODIUM ZIRCONIUM CYCLOSILICATE 10 GRAM(S): 10 POWDER, FOR SUSPENSION ORAL at 05:34

## 2023-07-18 RX ADMIN — SODIUM ZIRCONIUM CYCLOSILICATE 10 GRAM(S): 10 POWDER, FOR SUSPENSION ORAL at 18:22

## 2023-07-18 RX ADMIN — CHLORHEXIDINE GLUCONATE 1 APPLICATION(S): 213 SOLUTION TOPICAL at 05:34

## 2023-07-18 RX ADMIN — Medication 1 MILLIGRAM(S): at 18:21

## 2023-07-18 RX ADMIN — SODIUM ZIRCONIUM CYCLOSILICATE 10 GRAM(S): 10 POWDER, FOR SUSPENSION ORAL at 21:26

## 2023-07-18 NOTE — PROGRESS NOTE ADULT - ASSESSMENT
IMPRESSION  #Upper GI Bleed sp 2 units PRBC  #Acute on Chronic Anemia  #Severe AS  #CKD/ Hyperk    PLAN    CNS  - Avoid sedatives    HEENT  - Oral care    PULMONARY  - HOB at 45  - Aspiration precautions    CARDIOVASCULAR  - Cardio noted  - avoid volume overload    GI  - Diet: NPO  - GI ppx: pantoprazole Q 12H   -     RENAL  - strict I&Os, daily weight, and monitor volume status  - Follow up renal function and lytes, correct as needed (K>4; Mg>2)    INFECTIOUS DISEASE  - no signs of infection  - monitor VS    HEMATOLOGICAL  - DVT ppx: SCD's (no chemoprophylaxis i/s/o GI bleed), serial CBC    ENDOCRINE  - Monitor FS  - insulin protocol if needed    MUSCULOSKELETAL  - Activity: Increase as tolerated    DISPOSITION: MICU

## 2023-07-18 NOTE — PROGRESS NOTE ADULT - ASSESSMENT
77F w/ h/o chronic anemia requiring transfusions (has a port, follows Dr. 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 GI consulted for acute anemia and hematemesis. Patient was hypotensive on admission.    #?coffee ground emesis likely d/t AVM vs Erosive Hemorrhagic - resolved  #Hx of prior GI bleed with AVMs in stomach , duodenum, dieulafoy lesion in D3   - Hemodynamically stable & no active bleeding  - Baseline hemoglobin - 8  - Hemoglobin on admission - 6.7 (6/1 Hb was 8.5)>2u >8.9  -today Hb: 7.8  - BUN /Cr: 57/2.4 ( as of June: 58/2.8)  -potassium level 5 , (asymptomatic) on Lokelma   - Coags - 1.03 INR  - not on any  Antithrombotic or  Antithrombotic   - on PPI drip  - CT Angio AP: no active bleed  - s/p EGDx2, Colonoscopy x1, VCE x2, enteroscopyx4 most recent in 6/23 by  for anemia    - patient has severe AS, seen by cardiology  -no EGD as risk outweighs benefit

## 2023-07-18 NOTE — CHART NOTE - NSCHARTNOTEFT_GEN_A_CORE
PALLIATIVE MEDICINE INTERDISCIPLINARY TEAM NOTE    Provider:                                             Met with: [  x ] Patient  [   ] Family  [   ] Other:    Primary Language: [   ] English [   ] Other*:                      *Interpretation provided by:    SUPPORT DIAGNOSES            (Check all that apply)    [   ] EOL issues  [ x  ] Advanced Illness  [   ] Cultural / spiritual concerns  [  x ] Pain / suffering  [   ] Dementia / AMS  [   ] Other:  [   ] AD issues  [   ] Grief / loss / sadness  [   ] Discharge issues  [ x  ] Distress / coping    PSYCHOSOCIAL ASSESSMENT OF PATIENT         (Check all that apply)    [ x ] Initial Assessment            [   ] Reassessment          [   ] Not Applicable this visit    Pain/suffering acuity:  [ x  ] None to mild (0-3)           [   ] Moderate (4-6)        [   ] High (7-10)    Mental Status:  [  x ] Alert/oriented (x3)          [   ] Confused/Altered(x2/x1)         [   ] Non-resp    Functional status:  [   ] Independent w ADLs      [ x  ] Needs Assistance             [   ] Bedbound/Full Care    Coping:  [ x ] Coping well                     [   ] Coping w/difficulty            [   ] Poor coping    Support system:  [ x  ] Strong                              [   ] Adequate                        [   ] Inadequate      Past history and medications for:     [ ] Anxiety       [ ] Depression    [ ] Sleep disorders       SERVICE PROVIDED  [   ]Discharge support / facilitation  [   ]AD / goals of care counseling                                  [   ]EOL / death / bereavement counseling  [ x ]Counseling / support                                                [   ] Family meeting  [   ]Prayer / sacrament / ritual                                      [   ] Referral   [   ]Other                                                                       NOTE and Plan of Care (PoC):    patient is a 78 y/o F with pmhx prior GI Bleed, severe AS, CKD presenting with hematemesis. patient encountered resting in bed upon visit. Awake and alert. Reviewed role of palliative care and palliative SW.  She denied any pain or discomfort. She discussed hoping to get procedure today and go home soon. Her son is source of support and lives upstairs from her. support rendered.     as goals are clear, palliative will sign off. please re-consult PRN. [id number]

## 2023-07-18 NOTE — PROGRESS NOTE ADULT - ASSESSMENT
77F w/ h/o chronic anemia requiring transfusions (has a port, follows Dr. 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 GI consulted for acute anemia and coffee ground emesis. Patient was hypotensive on admission improved post transfusion.    #?coffee ground emesis likely d/t AVM vs Erosive Hemorrhagic Gastritis vs Dieulafoy lesion Vs Malignancy - resolved  #Acute on chronic anemia s/p 2u pRBC   #Hx of prior GI bleed with AVMs in stomach , duodenum, dieulafoy lesion in D3   - Hemodynamically stable & no active bleeding, no further coffee ground emesis  - Baseline hemoglobin - 8  - Hemoglobin on admission - 6.7 (6/1 Hb was 8.5)>2u >8.9>7.7  - BUN /Cr: 57/2.4 ( as of June: 58/2.8)  - Coags - 1.14 INR  - not on any  Antithrombotic or  Antithrombotic   - on PPI drip  - CT Angio AP: no active bleed  - s/p EGDx2, Colonoscopy x1, VCE x2, enteroscopyx4 most recent in 6/23 by  for anemia and AVMs: active oozing AVMs in D2 s/p APC +clips , 4 AVMs   - last colonoscopy : 10/2019: negative EGD, anemia workup by Dr. Robles; good prep : moderate diverticulosis in SC, DC, cecum; internal hemorrhoids  - patient has severe AS pending workup     PLAN:   - given multiple endoscopic interventions in the past for AVMs, risks outweigh benefits for any endoscopic intervention in the patient condition with severe AS, morbid obesity. Discussed with patient and ICU team, conservative management for now unless overt GI bleeding  - Maintain active Type and screen  - cardiology work up for Severe AS given multiple AVM bleed  - can change PPI drip to oral  daily  - Trend H&H  - Please avoid any NSAIDs  - recall us PRN    #Hx of diverticulosis  - avoid constipation    #HCM:  - outpatient colonoscopy (last one 2019)with her outpatient GI    Discussed with primary team

## 2023-07-19 LAB
ALBUMIN SERPL ELPH-MCNC: 2.8 G/DL — LOW (ref 3.5–5.2)
ALBUMIN SERPL ELPH-MCNC: 3.1 G/DL — LOW (ref 3.5–5.2)
ALP SERPL-CCNC: 70 U/L — SIGNIFICANT CHANGE UP (ref 30–115)
ALP SERPL-CCNC: 74 U/L — SIGNIFICANT CHANGE UP (ref 30–115)
ALT FLD-CCNC: 5 U/L — SIGNIFICANT CHANGE UP (ref 0–41)
ALT FLD-CCNC: 5 U/L — SIGNIFICANT CHANGE UP (ref 0–41)
ANION GAP SERPL CALC-SCNC: 7 MMOL/L — SIGNIFICANT CHANGE UP (ref 7–14)
ANION GAP SERPL CALC-SCNC: 9 MMOL/L — SIGNIFICANT CHANGE UP (ref 7–14)
AST SERPL-CCNC: 11 U/L — SIGNIFICANT CHANGE UP (ref 0–41)
AST SERPL-CCNC: 12 U/L — SIGNIFICANT CHANGE UP (ref 0–41)
BASOPHILS # BLD AUTO: 0.01 K/UL — SIGNIFICANT CHANGE UP (ref 0–0.2)
BASOPHILS NFR BLD AUTO: 0.3 % — SIGNIFICANT CHANGE UP (ref 0–1)
BILIRUB SERPL-MCNC: 1.2 MG/DL — SIGNIFICANT CHANGE UP (ref 0.2–1.2)
BILIRUB SERPL-MCNC: 1.2 MG/DL — SIGNIFICANT CHANGE UP (ref 0.2–1.2)
BUN SERPL-MCNC: 64 MG/DL — CRITICAL HIGH (ref 10–20)
BUN SERPL-MCNC: 65 MG/DL — CRITICAL HIGH (ref 10–20)
CALCIUM SERPL-MCNC: 8.1 MG/DL — LOW (ref 8.4–10.5)
CALCIUM SERPL-MCNC: 8.3 MG/DL — LOW (ref 8.4–10.4)
CHLORIDE SERPL-SCNC: 111 MMOL/L — HIGH (ref 98–110)
CHLORIDE SERPL-SCNC: 113 MMOL/L — HIGH (ref 98–110)
CO2 SERPL-SCNC: 19 MMOL/L — SIGNIFICANT CHANGE UP (ref 17–32)
CO2 SERPL-SCNC: 20 MMOL/L — SIGNIFICANT CHANGE UP (ref 17–32)
CREAT SERPL-MCNC: 2.5 MG/DL — HIGH (ref 0.7–1.5)
CREAT SERPL-MCNC: 2.5 MG/DL — HIGH (ref 0.7–1.5)
EGFR: 19 ML/MIN/1.73M2 — LOW
EGFR: 19 ML/MIN/1.73M2 — LOW
EOSINOPHIL # BLD AUTO: 0.33 K/UL — SIGNIFICANT CHANGE UP (ref 0–0.7)
EOSINOPHIL NFR BLD AUTO: 8.9 % — HIGH (ref 0–8)
GLUCOSE SERPL-MCNC: 77 MG/DL — SIGNIFICANT CHANGE UP (ref 70–99)
GLUCOSE SERPL-MCNC: 81 MG/DL — SIGNIFICANT CHANGE UP (ref 70–99)
HCT VFR BLD CALC: 25 % — LOW (ref 37–47)
HCT VFR BLD CALC: 25.5 % — LOW (ref 37–47)
HGB BLD-MCNC: 7.8 G/DL — LOW (ref 12–16)
HGB BLD-MCNC: 7.9 G/DL — LOW (ref 12–16)
IMM GRANULOCYTES NFR BLD AUTO: 0.3 % — SIGNIFICANT CHANGE UP (ref 0.1–0.3)
LYMPHOCYTES # BLD AUTO: 0.56 K/UL — LOW (ref 1.2–3.4)
LYMPHOCYTES # BLD AUTO: 15.1 % — LOW (ref 20.5–51.1)
MAGNESIUM SERPL-MCNC: 2.2 MG/DL — SIGNIFICANT CHANGE UP (ref 1.8–2.4)
MCHC RBC-ENTMCNC: 29.2 PG — SIGNIFICANT CHANGE UP (ref 27–31)
MCHC RBC-ENTMCNC: 29.4 PG — SIGNIFICANT CHANGE UP (ref 27–31)
MCHC RBC-ENTMCNC: 31 G/DL — LOW (ref 32–37)
MCHC RBC-ENTMCNC: 31.2 G/DL — LOW (ref 32–37)
MCV RBC AUTO: 94.1 FL — SIGNIFICANT CHANGE UP (ref 81–99)
MCV RBC AUTO: 94.3 FL — SIGNIFICANT CHANGE UP (ref 81–99)
MONOCYTES # BLD AUTO: 0.25 K/UL — SIGNIFICANT CHANGE UP (ref 0.1–0.6)
MONOCYTES NFR BLD AUTO: 6.7 % — SIGNIFICANT CHANGE UP (ref 1.7–9.3)
NEUTROPHILS # BLD AUTO: 2.55 K/UL — SIGNIFICANT CHANGE UP (ref 1.4–6.5)
NEUTROPHILS NFR BLD AUTO: 68.7 % — SIGNIFICANT CHANGE UP (ref 42.2–75.2)
NRBC # BLD: 0 /100 WBCS — SIGNIFICANT CHANGE UP (ref 0–0)
NRBC # BLD: 0 /100 WBCS — SIGNIFICANT CHANGE UP (ref 0–0)
PLATELET # BLD AUTO: 87 K/UL — LOW (ref 130–400)
PLATELET # BLD AUTO: 99 K/UL — LOW (ref 130–400)
PMV BLD: 10.5 FL — HIGH (ref 7.4–10.4)
PMV BLD: 10.6 FL — HIGH (ref 7.4–10.4)
POTASSIUM SERPL-MCNC: 4.5 MMOL/L — SIGNIFICANT CHANGE UP (ref 3.5–5)
POTASSIUM SERPL-MCNC: 4.5 MMOL/L — SIGNIFICANT CHANGE UP (ref 3.5–5)
POTASSIUM SERPL-SCNC: 4.5 MMOL/L — SIGNIFICANT CHANGE UP (ref 3.5–5)
POTASSIUM SERPL-SCNC: 4.5 MMOL/L — SIGNIFICANT CHANGE UP (ref 3.5–5)
PROT SERPL-MCNC: 4.8 G/DL — LOW (ref 6–8)
PROT SERPL-MCNC: 5 G/DL — LOW (ref 6–8)
RBC # BLD: 2.65 M/UL — LOW (ref 4.2–5.4)
RBC # BLD: 2.71 M/UL — LOW (ref 4.2–5.4)
RBC # FLD: 17.4 % — HIGH (ref 11.5–14.5)
RBC # FLD: 17.5 % — HIGH (ref 11.5–14.5)
SODIUM SERPL-SCNC: 139 MMOL/L — SIGNIFICANT CHANGE UP (ref 135–146)
SODIUM SERPL-SCNC: 140 MMOL/L — SIGNIFICANT CHANGE UP (ref 135–146)
WBC # BLD: 3.71 K/UL — LOW (ref 4.8–10.8)
WBC # BLD: 3.87 K/UL — LOW (ref 4.8–10.8)
WBC # FLD AUTO: 3.71 K/UL — LOW (ref 4.8–10.8)
WBC # FLD AUTO: 3.87 K/UL — LOW (ref 4.8–10.8)

## 2023-07-19 PROCEDURE — 99232 SBSQ HOSP IP/OBS MODERATE 35: CPT

## 2023-07-19 RX ORDER — PANTOPRAZOLE SODIUM 20 MG/1
40 TABLET, DELAYED RELEASE ORAL EVERY 12 HOURS
Refills: 0 | Status: DISCONTINUED | OUTPATIENT
Start: 2023-07-19 | End: 2023-07-20

## 2023-07-19 RX ADMIN — Medication 1 MILLIGRAM(S): at 11:55

## 2023-07-19 RX ADMIN — Medication 650 MILLIGRAM(S): at 16:49

## 2023-07-19 RX ADMIN — PANTOPRAZOLE SODIUM 40 MILLIGRAM(S): 20 TABLET, DELAYED RELEASE ORAL at 19:06

## 2023-07-19 RX ADMIN — PANTOPRAZOLE SODIUM 40 MILLIGRAM(S): 20 TABLET, DELAYED RELEASE ORAL at 06:17

## 2023-07-19 RX ADMIN — SODIUM ZIRCONIUM CYCLOSILICATE 10 GRAM(S): 10 POWDER, FOR SUSPENSION ORAL at 06:16

## 2023-07-19 RX ADMIN — Medication 650 MILLIGRAM(S): at 21:17

## 2023-07-19 RX ADMIN — Medication 25 MILLIGRAM(S): at 00:32

## 2023-07-19 RX ADMIN — SODIUM ZIRCONIUM CYCLOSILICATE 10 GRAM(S): 10 POWDER, FOR SUSPENSION ORAL at 16:49

## 2023-07-19 NOTE — DIETITIAN INITIAL EVALUATION ADULT - NSFNSGIASSESSMENTFT_GEN_A_CORE
last BM 7/19; no nausea/vomiting/diarrhea/constipation reported at this time. No active bleed reported today.

## 2023-07-19 NOTE — DIETITIAN INITIAL EVALUATION ADULT - NSFNSGIIOFT_GEN_A_CORE
Daily wts this admit:  103 kg (7/17), 108.2 kg (7/18), 107.8 kg (7/19)    BMI calculated using latest wt 107.8 kg.    IBW: 61.4 kg.

## 2023-07-19 NOTE — CHART NOTE - NSCHARTNOTEFT_GEN_A_CORE
TRANSFER NOTE    Hospital Course:  77F w/ h/o chronic anemia requiring transfusions (has a port, follows Dr. 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 GI consulted for acute anemia and hematemesis. Patient was hypotensive on admission. transfused 2 units and repeat hb was 8.9  GI deferred the EGD as no active bleed and pt is high risk for the procedure.  pt is vitally and clinically stable and being down graded to the floor.        Subjective/ROS: Denies HA, CP, SOB, n/v, changes in bowel/urinary habits.  12pt ROS otherwise negative.    VITALS  Vital Signs Last 24 Hrs  T(C): 36.2 (19 Jul 2023 04:00), Max: 36.6 (18 Jul 2023 16:00)  T(F): 97.2 (19 Jul 2023 04:00), Max: 97.8 (18 Jul 2023 16:00)  HR: 66 (19 Jul 2023 07:00) (60 - 78)  BP: 126/60 (19 Jul 2023 07:00) (100/56 - 127/60)  BP(mean): 87 (19 Jul 2023 07:00) (70 - 87)  RR: 23 (19 Jul 2023 07:00) (14 - 30)  SpO2: 99% (19 Jul 2023 07:00) (98% - 100%)    Parameters below as of 19 Jul 2023 06:00  Patient On (Oxygen Delivery Method): room air        CAPILLARY BLOOD GLUCOSE          PHYSICAL EXAM  General: A&Ox3; NAD  Head: NC/AT; MMM; PERRL; EOMI;  Neck: Supple; no JVD  Respiratory: CTA B/L; no wheezes/crackles   Cardiovascular: Regular rhythm/rate; S1/S2   Gastrointestinal: Soft; NTND; normoactive BS  Extremities: WWP; no edema/cyanosis  Neurological:  CNII-XII grossly intact; no obvious focal deficits    MEDICATIONS  (STANDING):  chlorhexidine 2% Cloths 1 Application(s) Topical <User Schedule>  folic acid 1 milliGRAM(s) Oral daily  pantoprazole    Tablet 40 milliGRAM(s) Oral every 12 hours  sodium bicarbonate 650 milliGRAM(s) Oral every 8 hours  sodium zirconium cyclosilicate 10 Gram(s) Oral every 8 hours    MEDICATIONS  (PRN):  diphenhydrAMINE 25 milliGRAM(s) Oral every 12 hours PRN Rash and/or Itching      aspirin (Rash; Other)  EKG leads (Hives)  penicillins (Rash; Urticaria; Hives; Blisters)  latex (Unknown)      LABS                        7.9    3.87  )-----------( 99       ( 19 Jul 2023 06:33 )             25.5     07-19    140  |  113<H>  |  64<HH>  ----------------------------<  81  4.5   |  20  |  2.5<H>    Ca    8.3<L>      19 Jul 2023 06:33  Phos  2.7     07-17  Mg     2.2     07-19    TPro  5.0<L>  /  Alb  3.1<L>  /  TBili  1.2  /  DBili  x   /  AST  11  /  ALT  5   /  AlkPhos  74  07-19    PT/INR - ( 18 Jul 2023 10:22 )   PT: 11.70 sec;   INR: 1.03 ratio         PTT - ( 18 Jul 2023 10:22 )  PTT:30.9 sec  Urinalysis Basic - ( 19 Jul 2023 06:33 )    Color: x / Appearance: x / SG: x / pH: x  Gluc: 81 mg/dL / Ketone: x  / Bili: x / Urobili: x   Blood: x / Protein: x / Nitrite: x   Leuk Esterase: x / RBC: x / WBC x   Sq Epi: x / Non Sq Epi: x / Bacteria: x            IMAGING/EKG/ETC  EKG:  Xray:  CT:  MRI: TRANSFER NOTE    Hospital Course:  77F w/ h/o chronic anemia requiring transfusions (has a port, follows Dr. 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 GI consulted for acute anemia and hematemesis. Patient was hypotensive on admission. transfused 2 units and repeat hb was 8.9  GI deferred the EGD as no active bleed and pt is high risk for the procedure.  pt is vitally and clinically stable and being down graded to the floor.      #UGIB likely d/t AVM vs Erosive Hemorrhagic - resolved  #Hx of prior GI bleed with AVMs in stomach , duodenum, dieulafoy lesion in D3   - Hemodynamically stable & no active bleeding  - Baseline hemoglobin - 8  - Hemoglobin on admission - 6.7 (6/1 Hb was 8.5)>2u >8.9  -today Hb: 7.9  - BUN /Cr: 57/2.4 ( as of June: 58/2.8)  -potassium level 4.5  - Coags - 1.03 INR  - not on any  Antithrombotic or  Antithrombotic   - on PPI drip  - CT Angio AP: no active bleed  - patient has severe AS, seen by cardiology  -no EGD as risk outweighs benefit  -advance diet  -trend CBC  -f/u nephro and GI  -trend potassium    Subjective/ROS: Denies HA, CP, SOB, n/v, changes in bowel/urinary habits.  12pt ROS otherwise negative.    VITALS  Vital Signs Last 24 Hrs  T(C): 36.2 (19 Jul 2023 04:00), Max: 36.6 (18 Jul 2023 16:00)  T(F): 97.2 (19 Jul 2023 04:00), Max: 97.8 (18 Jul 2023 16:00)  HR: 66 (19 Jul 2023 07:00) (60 - 78)  BP: 126/60 (19 Jul 2023 07:00) (100/56 - 127/60)  BP(mean): 87 (19 Jul 2023 07:00) (70 - 87)  RR: 23 (19 Jul 2023 07:00) (14 - 30)  SpO2: 99% (19 Jul 2023 07:00) (98% - 100%)    Parameters below as of 19 Jul 2023 06:00  Patient On (Oxygen Delivery Method): room air        CAPILLARY BLOOD GLUCOSE          PHYSICAL EXAM  General: A&Ox3; NAD  Head: NC/AT; MMM; PERRL; EOMI;  Neck: Supple; no JVD  Respiratory: CTA B/L; no wheezes/crackles   Cardiovascular: Regular rhythm/rate; S1/S2   Gastrointestinal: Soft; NTND; normoactive BS  Extremities: WWP; no edema/cyanosis  Neurological:  CNII-XII grossly intact; no obvious focal deficits    MEDICATIONS  (STANDING):  chlorhexidine 2% Cloths 1 Application(s) Topical <User Schedule>  folic acid 1 milliGRAM(s) Oral daily  pantoprazole    Tablet 40 milliGRAM(s) Oral every 12 hours  sodium bicarbonate 650 milliGRAM(s) Oral every 8 hours  sodium zirconium cyclosilicate 10 Gram(s) Oral every 8 hours    MEDICATIONS  (PRN):  diphenhydrAMINE 25 milliGRAM(s) Oral every 12 hours PRN Rash and/or Itching      aspirin (Rash; Other)  EKG leads (Hives)  penicillins (Rash; Urticaria; Hives; Blisters)  latex (Unknown)      LABS                        7.9    3.87  )-----------( 99       ( 19 Jul 2023 06:33 )             25.5     07-19    140  |  113<H>  |  64<HH>  ----------------------------<  81  4.5   |  20  |  2.5<H>    Ca    8.3<L>      19 Jul 2023 06:33  Phos  2.7     07-17  Mg     2.2     07-19    TPro  5.0<L>  /  Alb  3.1<L>  /  TBili  1.2  /  DBili  x   /  AST  11  /  ALT  5   /  AlkPhos  74  07-19    PT/INR - ( 18 Jul 2023 10:22 )   PT: 11.70 sec;   INR: 1.03 ratio         PTT - ( 18 Jul 2023 10:22 )  PTT:30.9 sec  Urinalysis Basic - ( 19 Jul 2023 06:33 )    Color: x / Appearance: x / SG: x / pH: x  Gluc: 81 mg/dL / Ketone: x  / Bili: x / Urobili: x   Blood: x / Protein: x / Nitrite: x   Leuk Esterase: x / RBC: x / WBC x   Sq Epi: x / Non Sq Epi: x / Bacteria: x            IMAGING/EKG/ETC  EKG:  Xray:  CT:  MRI: TRANSFER NOTE    Hospital Course:  77F w/ h/o chronic anemia requiring transfusions (has a port, follows Dr. 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 GI consulted for acute anemia and hematemesis. Patient was hypotensive on admission. transfused 2 units and repeat hb was 8.9  GI deferred the EGD as no active bleed and pt is high risk for the procedure.  pt is vitally and clinically stable and being down graded to the floor.      #UGIB likely d/t AVM vs Erosive Hemorrhagic   - resolved, no active bleed  - Hx of prior GI bleed with AVMs in stomach , duodenum, dieulafoy lesion in D3   - Hemodynamically stable & no active bleeding  - Baseline hemoglobin - 8  - Hemoglobin on admission - 6.7 (6/1 Hb was 8.5)>2u >8.9  - Today Hb: 7.9  - BUN /Cr: 57/2.4 ( as of June: 58/2.8)  - Coags - 1.03 INR  - not on any  Antithrombotic or  Antithrombotic   - on PPI PO q12  - CT Angio AP: no active bleed  - no EGD as per GI, as risk outweighs benefit  - advance diet  - trend CBC  - f/u GI- keep active type and screen    #Hyperkalemia  - K: 6.1, was started on lokelma and stat Ca gluconate, k(7/19): 4.5  - d/c lokelma  - trend potassium    #Severe AS  - f/u cardio as out patient    #CKD stage 4  - creat at baseline 2.5  - lasix held d/t hypotension post UGIB  - rec restart lasix if stable BP   - c/w sodium bicarb PO  - f/u nephro as out patient    Subjective/ROS: Denies HA, CP, SOB, n/v, changes in bowel/urinary habits.  12pt ROS otherwise negative.    VITALS  Vital Signs Last 24 Hrs  T(C): 36.2 (19 Jul 2023 04:00), Max: 36.6 (18 Jul 2023 16:00)  T(F): 97.2 (19 Jul 2023 04:00), Max: 97.8 (18 Jul 2023 16:00)  HR: 66 (19 Jul 2023 07:00) (60 - 78)  BP: 126/60 (19 Jul 2023 07:00) (100/56 - 127/60)  BP(mean): 87 (19 Jul 2023 07:00) (70 - 87)  RR: 23 (19 Jul 2023 07:00) (14 - 30)  SpO2: 99% (19 Jul 2023 07:00) (98% - 100%)    Parameters below as of 19 Jul 2023 06:00  Patient On (Oxygen Delivery Method): room air          PHYSICAL EXAM  General: A&Ox3; NAD  Head: NC/AT; MMM; PERRL; EOMI;  Neck: Supple; no JVD  Respiratory: CTA B/L; no wheezes/crackles   Cardiovascular: Regular rhythm/rate; S1/S2   Gastrointestinal: Soft; NTND; normoactive BS  Extremities: WWP; no edema/cyanosis  Neurological:  CNII-XII grossly intact; no obvious focal deficits    MEDICATIONS  (STANDING):  chlorhexidine 2% Cloths 1 Application(s) Topical <User Schedule>  folic acid 1 milliGRAM(s) Oral daily  pantoprazole    Tablet 40 milliGRAM(s) Oral every 12 hours  sodium bicarbonate 650 milliGRAM(s) Oral every 8 hours  sodium zirconium cyclosilicate 10 Gram(s) Oral every 8 hours    MEDICATIONS  (PRN):  diphenhydrAMINE 25 milliGRAM(s) Oral every 12 hours PRN Rash and/or Itching      aspirin (Rash; Other)  EKG leads (Hives)  penicillins (Rash; Urticaria; Hives; Blisters)  latex (Unknown)      LABS                        7.9    3.87  )-----------( 99       ( 19 Jul 2023 06:33 )             25.5     07-19    140  |  113<H>  |  64<HH>  ----------------------------<  81  4.5   |  20  |  2.5<H>    Ca    8.3<L>      19 Jul 2023 06:33  Phos  2.7     07-17  Mg     2.2     07-19    TPro  5.0<L>  /  Alb  3.1<L>  /  TBili  1.2  /  DBili  x   /  AST  11  /  ALT  5   /  AlkPhos  74  07-19    PT/INR - ( 18 Jul 2023 10:22 )   PT: 11.70 sec;   INR: 1.03 ratio         PTT - ( 18 Jul 2023 10:22 )  PTT:30.9 sec  Urinalysis Basic - ( 19 Jul 2023 06:33 )    Color: x / Appearance: x / SG: x / pH: x  Gluc: 81 mg/dL / Ketone: x  / Bili: x / Urobili: x   Blood: x / Protein: x / Nitrite: x   Leuk Esterase: x / RBC: x / WBC x   Sq Epi: x / Non Sq Epi: x / Bacteria: x            IMAGING/EKG/ETC  EKG:  Xray:  CT:  MRI:

## 2023-07-19 NOTE — CONSULT NOTE ADULT - ASSESSMENT
77F w/ h/o chronic anemia requiring transfusions (has a port, follows Dr. 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 GI consulted for acute anemia and hematemesis. Patient was hypotensive on admission.    Assessment and plan  CKD 4/ UGIB/ anemia/ severe AS  Creatinine at baseline  hyperkalemia secondary to GIB, resolved  hgb and hemodynamics stable  started on po diet  lasix held due to hypotension and NPO status, can restart starting tomorrow if patient remains stable  d/c lokelma  c/w sodium bicarb po  P at target  anemia secondary to bleeding, iron studies misleading post transfusion  will sign off/ recall prn

## 2023-07-19 NOTE — DIETITIAN INITIAL EVALUATION ADULT - NS FNS DIET ORDER
Diet, DASH/TLC:   Sodium & Cholesterol Restricted  No Concentrated Potassium (07-19-23 @ 08:21)  Diet advanced from clear liquid diet earlier today. Pt reports fair tolerance to diet order at this time; consuming 75% of meals at this time. Prior to this, pt was receiving clear liquid diet from 7/17 - 7/19, during which pt reported 75% po intake (pt was not meeting estimated nutrient needs during this time as clear liquid diet order inherently does not meet nutrient needs).    No chewing/swallowing difficulty reported at this time.

## 2023-07-19 NOTE — DIETITIAN INITIAL EVALUATION ADULT - ORAL INTAKE PTA/DIET HISTORY
Reports fair appetite & po intake PTP. Reportedly follows a regular diet with no therapeutic restriction. Consumes 3 meals/day. NKFA. Takes folic acid; no other supplements reported. No food preferences reported. No dietary restrictions related to culture/Restorationism reported. UBW reported to be 100 kg with no known h/o unintentional wt loss. No signs of significant muscle wasting/subcutaneous fat loss observed at this time.

## 2023-07-19 NOTE — DIETITIAN INITIAL EVALUATION ADULT - OTHER INFO
Pertinent Medical Information: Presented w/ hematemesis. GI evaluated pt for coffee ground emesis. Upper GI Bleed noted - now s/p 2 units PRBC; per latest progress notes, no active bleed and no GI intervention at this time. Diet advanced. Acute on Chronic Anemia noted. CKD noted. h/o diverticulosis noted.    PMH includes prior GI bleed (due to AVM's and duodenal ulcers), severe AS, and CKD.

## 2023-07-19 NOTE — DIETITIAN INITIAL EVALUATION ADULT - PERTINENT LABORATORY DATA
07-19    140  |  113<H>  |  64<HH>  ----------------------------<  81  4.5   |  20  |  2.5<H>    Ca    8.3<L>      19 Jul 2023 06:33  Mg     2.2     07-19    TPro  5.0<L>  /  Alb  3.1<L>  /  TBili  1.2  /  DBili  x   /  AST  11  /  ALT  5   /  AlkPhos  74  07-19  A1C with Estimated Average Glucose Result: 4.6 % (04-01-23 @ 11:14)  A1C with Estimated Average Glucose Result: 4.3 % (03-22-23 @ 09:19)  A1C with Estimated Average Glucose Result: 4.5 % (03-22-23 @ 02:50)    Hyperkalemia noted earlier this admit; currently WDL at this time.    7/17 PO4-2.7 (WDL)

## 2023-07-19 NOTE — CONSULT NOTE ADULT - SUBJECTIVE AND OBJECTIVE BOX
NEPHROLOGY CONSULTATION NOTE    77F w/ h/o chronic anemia requiring transfusions (has a port, follows Dr. 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 GI consulted for acute anemia and hematemesis. Patient was hypotensive on admission.  patient seen at bedside, no major complaints, no further episodes of hematemesis.  started on po diet, tolerating well.  VS within acceptable range     PAST MEDICAL & SURGICAL HISTORY:  HTN (hypertension)      Heart murmur      Eczema      Anemia  iron infusions and PRBC transfusions      Aortic stenosis      Chronic kidney disease (CKD)      2019 novel coronavirus disease (COVID-19)  4/2020- REHAB admission      Gastric AVM      H/O appendicitis      S/P cholecystectomy      History of esophagogastroduodenoscopy (EGD)      H/O colonoscopy        Allergies:  aspirin (Rash; Other)  EKG leads (Hives)  penicillins (Rash; Urticaria; Hives; Blisters)  latex (Unknown)    Home Medications Reviewed  Hospital Medications:   MEDICATIONS  (STANDING):  chlorhexidine 2% Cloths 1 Application(s) Topical <User Schedule>  folic acid 1 milliGRAM(s) Oral daily  pantoprazole    Tablet 40 milliGRAM(s) Oral every 12 hours  sodium bicarbonate 650 milliGRAM(s) Oral every 8 hours  sodium zirconium cyclosilicate 10 Gram(s) Oral every 8 hours    SOCIAL HISTORY:  Denies ETOH,Smoking,   FAMILY HISTORY:  FH: kidney disease (Father)    FH: breast cancer (Mother)    FH: multiple myeloma (Mother)        REVIEW OF SYSTEMS:  CONSTITUTIONAL: No weakness, fevers or chills  EYES/ENT: No visual changes;  No vertigo or throat pain   NECK: No pain or stiffness  RESPIRATORY: No cough, wheezing, hemoptysis; No shortness of breath  CARDIOVASCULAR: No chest pain or palpitations.  GASTROINTESTINAL: No abdominal or epigastric pain. No nausea, vomiting, or hematemesis; No diarrhea or constipation. No melena or hematochezia.  GENITOURINARY: No dysuria, frequency, foamy urine, urinary urgency, incontinence or hematuria  NEUROLOGICAL: No numbness or weakness  SKIN: No itching, burning, rashes, or lesions   VASCULAR: No bilateral lower extremity edema.   All other review of systems is negative unless indicated above.    VITALS:  Vital Signs Last 24 Hrs  T(C): 36.2 (19 Jul 2023 04:00), Max: 36.6 (18 Jul 2023 16:00)  T(F): 97.2 (19 Jul 2023 04:00), Max: 97.8 (18 Jul 2023 16:00)  HR: 66 (19 Jul 2023 07:00) (60 - 78)  BP: 126/60 (19 Jul 2023 07:00) (100/56 - 127/60)  BP(mean): 87 (19 Jul 2023 07:00) (70 - 87)  RR: 23 (19 Jul 2023 07:00) (14 - 30)  SpO2: 99% (19 Jul 2023 07:00) (98% - 100%)    Parameters below as of 19 Jul 2023 06:00  Patient On (Oxygen Delivery Method): room air        07-18 @ 07:01  -  07-19 @ 07:00  --------------------------------------------------------  IN: 720 mL / OUT: 1251 mL / NET: -531 mL        PHYSICAL EXAM:  Constitutional: NAD  HEENT: anicteric sclera, oropharynx clear, MMM  Neck: No JVD  Respiratory: CTAB, no wheezes, rales or rhonchi  Cardiovascular: S1, S2, RRR  Gastrointestinal: BS+, soft, NT/ND  Extremities: No cyanosis or clubbing. No peripheral edema  Neurological: A/O x 3, no focal deficits  Psychiatric: Normal mood, normal affect  : No CVA tenderness  Skin: No rashes    LABS:  07-19    140  |  113<H>  |  64<HH>  ----------------------------<  81  4.5   |  20  |  2.5<H>    Ca    8.3<L>      19 Jul 2023 06:33  Phos  2.7     07-17  Mg     2.2     07-19    TPro  5.0<L>  /  Alb  3.1<L>  /  TBili  1.2  /  DBili      /  AST  11  /  ALT  5   /  AlkPhos  74  07-19    Creatinine Trend: 2.5 <--, 2.5 <--, 2.6 <--, 2.6 <--, 2.4 <--, 2.5 <--, 2.4 <--                        7.9    3.87  )-----------( 99       ( 19 Jul 2023 06:33 )             25.5     Urine Studies:  Urinalysis Basic - ( 19 Jul 2023 06:33 )    Color:  / Appearance:  / SG:  / pH:   Gluc: 81 mg/dL / Ketone:   / Bili:  / Urobili:    Blood:  / Protein:  / Nitrite:    Leuk Esterase:  / RBC:  / WBC    Sq Epi:  / Non Sq Epi:  / Bacteria:                    NEPHROLOGY CONSULTATION NOTE    77F w/ h/o chronic anemia requiring transfusions (has a port, follows Dr. 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 GI consulted for acute anemia and hematemesis. Patient was hypotensive on admission.  patient seen at bedside, no major complaints, no further episodes of hematemesis.  started on po diet, tolerating well.  VS within acceptable range     PAST MEDICAL & SURGICAL HISTORY:  HTN (hypertension)  Heart murmur  Eczema  Anemia iron infusions and PRBC transfusions  Aortic stenosis  Chronic kidney disease (CKD)  2019 novel coronavirus disease (COVID-19)  4/2020- RE[facility] admission  Gastric AVM  H/O appendicitis  S/P cholecystectomy  History of esophagogastroduodenoscopy (EGD)  H/O colonoscopy        Allergies:  aspirin (Rash; Other)  EKG leads (Hives)  penicillins (Rash; Urticaria; Hives; Blisters)  latex (Unknown)    Home Medications Reviewed  Hospital Medications:   MEDICATIONS  (STANDING):  folic acid 1 milliGRAM(s) Oral daily  pantoprazole    Tablet 40 milliGRAM(s) Oral every 12 hours  sodium bicarbonate 650 milliGRAM(s) Oral every 8 hours  sodium zirconium cyclosilicate 10 Gram(s) Oral every 8 hours    SOCIAL HISTORY:  Denies ETOH,Smoking,   FAMILY HISTORY:  FH: kidney disease (Father)    FH: breast cancer (Mother)    FH: multiple myeloma (Mother)        REVIEW OF SYSTEMS:  CONSTITUTIONAL: No weakness, fevers or chills  EYES/ENT: No visual changes;  No vertigo or throat pain   NECK: No pain or stiffness  RESPIRATORY: No cough, wheezing, hemoptysis; No shortness of breath  CARDIOVASCULAR: No chest pain or palpitations.  GASTROINTESTINAL: No abdominal or epigastric pain. No nausea, vomiting, or hematemesis; No diarrhea or constipation. No melena or hematochezia.  GENITOURINARY: No dysuria, frequency, foamy urine, urinary urgency, incontinence or hematuria  NEUROLOGICAL: No numbness or weakness  SKIN: No itching, burning, rashes, or lesions   VASCULAR: No bilateral lower extremity edema.   All other review of systems is negative unless indicated above.    VITALS:  Vital Signs Last 24 Hrs  T(C): 36.2 (19 Jul 2023 04:00), Max: 36.6 (18 Jul 2023 16:00)  T(F): 97.2 (19 Jul 2023 04:00), Max: 97.8 (18 Jul 2023 16:00)  HR: 66 (19 Jul 2023 07:00) (60 - 78)  BP: 126/60 (19 Jul 2023 07:00) (100/56 - 127/60)  BP(mean): 87 (19 Jul 2023 07:00) (70 - 87)  RR: 23 (19 Jul 2023 07:00) (14 - 30)  SpO2: 99% (19 Jul 2023 07:00) (98% - 100%)    Parameters below as of 19 Jul 2023 06:00  Patient On (Oxygen Delivery Method): room air        07-18 @ 07:01  -  07-19 @ 07:00  --------------------------------------------------------  IN: 720 mL / OUT: 1251 mL / NET: -531 mL        PHYSICAL EXAM:  Constitutional: NAD  Respiratory: CTAB  Cardiovascular: S1, S2, RRR  Gastrointestinal: BS+, soft, NT/ND  Extremities: No cyanosis or clubbing. No peripheral edema  Neurological: A/O x 3, no focal deficits  Psychiatric: Normal mood, normal affect  : No CVA tenderness  Skin: No rashes    LABS:  07-19    140  |  113<H>  |  64<HH>  ----------------------------<  81  4.5   |  20  |  2.5<H>    Ca    8.3<L>      19 Jul 2023 06:33  Phos  2.7     07-17  Mg     2.2     07-19    TPro  5.0<L>  /  Alb  3.1<L>  /  TBili  1.2  /  DBili      /  AST  11  /  ALT  5   /  AlkPhos  74  07-19    Creatinine Trend: 2.5 <--, 2.5 <--, 2.6 <--, 2.6 <--, 2.4 <--, 2.5 <--, 2.4 <--                        7.9    3.87  )-----------( 99       ( 19 Jul 2023 06:33 )             25.5     Urine Studies:  Urinalysis Basic - ( 19 Jul 2023 06:33 )    Color:  / Appearance:  / SG:  / pH:   Gluc: 81 mg/dL / Ketone:   / Bili:  / Urobili:    Blood:  / Protein:  / Nitrite:    Leuk Esterase:  / RBC:  / WBC    Sq Epi:  / Non Sq Epi:  / Bacteria:

## 2023-07-19 NOTE — DIETITIAN INITIAL EVALUATION ADULT - ADD RECOMMEND
Recommendation: Continue DASH/TLC diet as tolerated. Remove no concentrated potassium diet restriction.

## 2023-07-19 NOTE — DIETITIAN INITIAL EVALUATION ADULT - COLLABORATION WITH OTHER PROVIDERS
[Time Spent: ___ minutes] : I have spent [unfilled] minutes of time on the encounter. RN, Resident - Continue PPX acyclovir and fluconazole  - Pentamidine Q2 week for PJP PPX.  Last given on 8/26: Next due on 9/9

## 2023-07-19 NOTE — DIETITIAN INITIAL EVALUATION ADULT - PERTINENT MEDS FT
MEDICATIONS  (STANDING):  chlorhexidine 2% Cloths 1 Application(s) Topical <User Schedule>  folic acid 1 milliGRAM(s) Oral daily  pantoprazole    Tablet 40 milliGRAM(s) Oral every 12 hours  sodium bicarbonate 650 milliGRAM(s) Oral every 8 hours  sodium zirconium cyclosilicate 10 Gram(s) Oral every 8 hours    MEDICATIONS  (PRN):  diphenhydrAMINE 25 milliGRAM(s) Oral every 12 hours PRN Rash and/or Itching

## 2023-07-19 NOTE — DIETITIAN INITIAL EVALUATION ADULT - NUTRITIONGOAL OUTCOME1
Pt to demonstrate tolerance to diet advance to solids, with at least 75% po intake maintained over next 5-7 days.    Pt at moderate nutrition risk; RD to follow-up in 5-7 days. Pt made moderate nutrition risk to monitor for ongoing tolerance to diet advance to solids.    Monitor: Skin, labs, BM, wt, nutrition focused physical findings, body composition, GI, diet order.

## 2023-07-19 NOTE — PROGRESS NOTE ADULT - ASSESSMENT
IMPRESSION    #Upper GI Bleed sp 2 units PRBC no active bleed no GI intervention  #Acute on Chronic Anemia  #Severe AS  #CKD/ Hyperk    PLAN    CNS  - Avoid sedatives    HEENT  - Oral care    PULMONARY  - HOB at 45  - Aspiration precautions    CARDIOVASCULAR  - Cardio noted  - avoid volume overload    GI  - Diet: advance diet  - GI ppx: pantoprazole Q 12H   -     RENAL  - strict I&Os, daily weight, and monitor volume status  - Follow up renal function and lytes, correct as needed (K>4; Mg>2)    INFECTIOUS DISEASE  - no signs of infection  - monitor VS    HEMATOLOGICAL  - DVT ppx: SCD's (no chemoprophylaxis i/s/o GI bleed), serial CBC    ENDOCRINE  - Monitor FS  - insulin protocol if needed    MUSCULOSKELETAL  - Activity: Increase as tolerated    floor

## 2023-07-19 NOTE — CONSULT NOTE ADULT - ATTENDING COMMENTS
# CKD 4 / creat at baseline   follow BMP   resume lasix once able   will need OP renal follow up   will sign off recall PRN / call using TEAMS or on [phone]
AS/high risk for MACE  proceed as planned  mx as per residents note  recall prn
I edited the note

## 2023-07-20 ENCOUNTER — TRANSCRIPTION ENCOUNTER (OUTPATIENT)
Age: 78
End: 2023-07-20

## 2023-07-20 VITALS — SYSTOLIC BLOOD PRESSURE: 130 MMHG | DIASTOLIC BLOOD PRESSURE: 58 MMHG

## 2023-07-20 LAB
HCT VFR BLD CALC: 23.9 % — LOW (ref 37–47)
HGB BLD-MCNC: 7.4 G/DL — LOW (ref 12–16)
MCHC RBC-ENTMCNC: 29.2 PG — SIGNIFICANT CHANGE UP (ref 27–31)
MCHC RBC-ENTMCNC: 31 G/DL — LOW (ref 32–37)
MCV RBC AUTO: 94.5 FL — SIGNIFICANT CHANGE UP (ref 81–99)
NRBC # BLD: 0 /100 WBCS — SIGNIFICANT CHANGE UP (ref 0–0)
PLATELET # BLD AUTO: 90 K/UL — LOW (ref 130–400)
PMV BLD: 10.9 FL — HIGH (ref 7.4–10.4)
RBC # BLD: 2.53 M/UL — LOW (ref 4.2–5.4)
RBC # FLD: 17 % — HIGH (ref 11.5–14.5)
WBC # BLD: 3.03 K/UL — LOW (ref 4.8–10.8)
WBC # FLD AUTO: 3.03 K/UL — LOW (ref 4.8–10.8)

## 2023-07-20 PROCEDURE — 99232 SBSQ HOSP IP/OBS MODERATE 35: CPT

## 2023-07-20 RX ORDER — PANTOPRAZOLE SODIUM 20 MG/1
1 TABLET, DELAYED RELEASE ORAL
Qty: 60 | Refills: 0
Start: 2023-07-20 | End: 2023-08-18

## 2023-07-20 RX ORDER — SODIUM BICARBONATE 1 MEQ/ML
1 SYRINGE (ML) INTRAVENOUS
Qty: 0 | Refills: 0 | DISCHARGE
Start: 2023-07-20

## 2023-07-20 RX ORDER — SODIUM BICARBONATE 1 MEQ/ML
1 SYRINGE (ML) INTRAVENOUS
Qty: 90 | Refills: 0
Start: 2023-07-20 | End: 2023-08-18

## 2023-07-20 RX ADMIN — CHLORHEXIDINE GLUCONATE 1 APPLICATION(S): 213 SOLUTION TOPICAL at 05:17

## 2023-07-20 RX ADMIN — Medication 650 MILLIGRAM(S): at 13:00

## 2023-07-20 RX ADMIN — Medication 1 MILLIGRAM(S): at 12:59

## 2023-07-20 RX ADMIN — Medication 650 MILLIGRAM(S): at 05:17

## 2023-07-20 RX ADMIN — PANTOPRAZOLE SODIUM 40 MILLIGRAM(S): 20 TABLET, DELAYED RELEASE ORAL at 05:17

## 2023-07-20 NOTE — DISCHARGE NOTE NURSING/CASE MANAGEMENT/SOCIAL WORK - NSDCPEFALRISK_GEN_ALL_CORE
For information on Fall & Injury Prevention, visit: [email or web address] OR  [email or web address] OR  https://www.cdc.gov/steadi/patient.html

## 2023-07-20 NOTE — DISCHARGE NOTE PROVIDER - NSDCMRMEDTOKEN_GEN_ALL_CORE_FT
folic acid 1 mg oral tablet: 1 tab(s) orally once a day  furosemide 40 mg oral tablet: 1 tab(s) orally once a day  metoprolol tartrate 25 mg oral tablet: 1 tab(s) orally 2 times a day  pantoprazole 40 mg oral delayed release tablet: 1 tab(s) orally 2 times a day  sodium bicarbonate 650 mg oral tablet: 1 tab(s) orally every 8 hours

## 2023-07-20 NOTE — PROGRESS NOTE ADULT - ASSESSMENT
IMPRESSION    #Upper GI Bleed sp 2 units PRBC no active bleed no GI intervention  #Acute on Chronic Anemia  #Severe AS  #CKD    PLAN    CNS  - Avoid sedatives    HEENT  - Oral care    PULMONARY  - HOB at 44  - Aspiration precautions    CARDIOVASCULAR  - Cardio noted  - avoid volume overload    GI  - Diet: advance diet  - GI ppx: pantoprazole Q 12H   -     RENAL  - strict I&Os, daily weight, and monitor volume status  - Follow up renal function and lytes, correct as needed (K>4; Mg>2)    INFECTIOUS DISEASE  - no signs of infection  - monitor VS    HEMATOLOGICAL  - DVT ppx: SCD's (no chemoprophylaxis i/s/o GI bleed), serial CBC  - LYNETTE boyce    ENDOCRINE  - Monitor FS  - insulin protocol if needed    MUSCULOSKELETAL  - Activity: Increase as tolerated    floor IMPRESSION    #Upper GI Bleed sp 2 units PRBC no active bleed no GI intervention  #Acute on Chronic Anemia  #Severe AS  #CKD  # pancytopenia  PLAN    CNS  - Avoid sedatives    HEENT  - Oral care    PULMONARY  - HOB at 44  - Aspiration precautions    CARDIOVASCULAR  - Cardio noted  - avoid volume overload    GI  - Diet: advance diet  - GI ppx: pantoprazole Q 12H   -     RENAL  - strict I&Os, daily weight, and monitor volume status  - Follow up renal function and lytes, correct as needed (K>4; Mg>2)    INFECTIOUS DISEASE  - no signs of infection  - monitor VS    HEMATOLOGICAL  - DVT ppx: SCD's (no chemoprophylaxis i/s/o GI bleed), serial CBC      ENDOCRINE  - Monitor FS  - insulin protocol if needed    MUSCULOSKELETAL  - Activity: Increase as tolerated    floor

## 2023-07-20 NOTE — DISCHARGE NOTE NURSING/CASE MANAGEMENT/SOCIAL WORK - PATIENT PORTAL LINK FT
You can access the FollowMyHealth Patient Portal offered by [facility] by registering at the following website: [email or web address]. By joining [facility]’s FollowMyHealth portal, you will also be able to view your health information using other applications (apps) compatible with our system.

## 2023-07-20 NOTE — DISCHARGE NOTE PROVIDER - CARE PROVIDERS DIRECT ADDRESSES
[email or web address]

## 2023-07-20 NOTE — PROGRESS NOTE ADULT - SUBJECTIVE AND OBJECTIVE BOX
Gastroenterology progress note:     Patient is a 77y old  Female who presents with a chief complaint of hematemesis (18 Jul 2023 11:19)       Admitted on: 07-17-23    We are following the patient for: coffee ground emesis, anemia       Interval History:    No acute events overnight.       PAST MEDICAL & SURGICAL HISTORY:  HTN (hypertension)      Heart murmur      Eczema      Anemia  iron infusions and PRBC transfusions      Aortic stenosis      Chronic kidney disease (CKD)      2019 novel coronavirus disease (COVID-19)  4/2020- REHAB admission      Gastric AVM      H/O appendicitis      S/P cholecystectomy      History of esophagogastroduodenoscopy (EGD)      H/O colonoscopy          MEDICATIONS  (STANDING):  chlorhexidine 2% Cloths 1 Application(s) Topical <User Schedule>  folic acid 1 milliGRAM(s) Oral daily  pantoprazole  Injectable 40 milliGRAM(s) IV Push every 12 hours  sodium bicarbonate 650 milliGRAM(s) Oral every 8 hours  sodium zirconium cyclosilicate 10 Gram(s) Oral every 8 hours    MEDICATIONS  (PRN):  diphenhydrAMINE 25 milliGRAM(s) Oral every 12 hours PRN Rash and/or Itching      Allergies  aspirin (Rash; Other)  EKG leads (Hives)  penicillins (Rash; Urticaria; Hives; Blisters)  latex (Unknown)      Review of Systems:   Cardiovascular:  No Chest Pain, No Palpitations  Respiratory:  No Cough, No Dyspnea  Gastrointestinal:  As described in HPI  Skin:  No Skin Lesions, No Jaundice  Neuro:  No Syncope, No Dizziness    Physical Examination:  T(C): 36.7 (07-18-23 @ 08:00), Max: 37 (07-17-23 @ 16:00)  HR: 67 (07-18-23 @ 10:00) (64 - 84)  BP: 114/57 (07-18-23 @ 08:00) (102/57 - 128/58)  RR: 17 (07-18-23 @ 10:00) (17 - 31)  SpO2: 99% (07-18-23 @ 08:00) (91% - 100%)      07-17-23 @ 07:01  -  07-18-23 @ 07:00  --------------------------------------------------------  IN: 650 mL / OUT: 600 mL / NET: 50 mL        GENERAL: AAOx3, no acute distress, morbidly obese  HEAD:  Atraumatic, Normocephalic  EYES: conjunctiva and sclera clear  NECK: Supple, no JVD or thyromegaly  CHEST/LUNG: Clear to auscultation bilaterally; No wheeze, rhonchi, or rales  HEART: Regular rate and rhythm; normal S1, S2, No murmurs.  ABDOMEN: Soft, nontender, nondistended; Bowel sounds present  NEUROLOGY: No asterixis or tremor.   SKIN: Intact, no jaundice     Data:                        7.7    4.23  )-----------( 91       ( 18 Jul 2023 11:00 )             24.1     Hgb trend:  7.7  07-18-23 @ 11:00  7.8  07-18-23 @ 10:22  8.7  07-17-23 @ 19:36  8.9  07-17-23 @ 10:37  6.7  07-16-23 @ 20:30        07-18    143  |  115<H>  |  70<HH>  ----------------------------<  83  5.0   |  20  |  2.6<H>    Ca    8.4      18 Jul 2023 10:22  Phos  2.7     07-17  Mg     2.6     07-17    TPro  4.9<L>  /  Alb  3.0<L>  /  TBili  1.2  /  DBili  x   /  AST  10  /  ALT  5   /  AlkPhos  74  07-18    Liver panel trend:  TBili 1.2   /   AST 10   /   ALT 5   /   AlkP 74   /   Tptn 4.9   /   Alb 3.0    /   DBili --      07-18  TBili 1.0   /   AST 13   /   ALT 6   /   AlkP 85   /   Tptn 5.3   /   Alb 3.4    /   DBili --      07-17  TBili 0.6   /   AST 11   /   ALT <5   /   AlkP 83   /   Tptn 5.1   /   Alb 3.0    /   DBili --      07-16      PT/INR - ( 18 Jul 2023 10:22 )   PT: 11.70 sec;   INR: 1.03 ratio         PTT - ( 18 Jul 2023 10:22 )  PTT:30.9 sec       Radiology:      
Over Night Events: events noted, on PPI drip, sp 2 units PRBC    PHYSICAL EXAM    ICU Vital Signs Last 24 Hrs  T(C): 36.8 (18 Jul 2023 04:00), Max: 37 (17 Jul 2023 16:00)  T(F): 98.2 (18 Jul 2023 04:00), Max: 98.6 (17 Jul 2023 16:00)  HR: 70 (18 Jul 2023 07:00) (67 - 84)  BP: 113/54 (18 Jul 2023 07:00) (102/57 - 128/58)  BP(mean): 78 (18 Jul 2023 07:00) (73 - 95)-  RR: 18 (18 Jul 2023 07:00) (17 - 31)  SpO2: 100% (18 Jul 2023 07:00) (91% - 100%)    O2 Parameters below as of 18 Jul 2023 06:00  Patient On (Oxygen Delivery Method): room air            General: comfortable  Lungs: Bilateral BS  Cardiovascular: Regular   Abdomen: Soft, Positive BS  Extremities: No clubbing   Skin: Warm  Neurological: Non focal       07-17-23 @ 07:01  -  07-18-23 @ 07:00  --------------------------------------------------------  IN:    IV PiggyBack: 100 mL    Oral Fluid: 360 mL    Pantoprazole: 190 mL  Total IN: 650 mL    OUT:    Voided (mL): 600 mL  Total OUT: 600 mL    Total NET: 50 mL          LABS:                          8.7    5.86  )-----------( 111      ( 17 Jul 2023 19:36 )             27.1                                               07-17    139  |  112<H>  |  72<HH>  ----------------------------<  104<H>  5.7<H>   |  18  |  2.4<H>    Ca    9.0      17 Jul 2023 19:36  Phos  2.7     07-17  Mg     2.6     07-17    TPro  5.3<L>  /  Alb  3.4<L>  /  TBili  1.0  /  DBili  x   /  AST  13  /  ALT  6   /  AlkPhos  85  07-17      PT/INR - ( 16 Jul 2023 20:30 )   PT: 13.00 sec;   INR: 1.14 ratio         PTT - ( 16 Jul 2023 20:30 )  PTT:30.5 sec                                       Urinalysis Basic - ( 17 Jul 2023 19:36 )    Color: x / Appearance: x / SG: x / pH: x  Gluc: 104 mg/dL / Ketone: x  / Bili: x / Urobili: x   Blood: x / Protein: x / Nitrite: x   Leuk Esterase: x / RBC: x / WBC x   Sq Epi: x / Non Sq Epi: x / Bacteria: x        CARDIAC MARKERS ( 16 Jul 2023 20:30 )  x     / <0.01 ng/mL / x     / x     / x                                                LIVER FUNCTIONS - ( 17 Jul 2023 11:49 )  Alb: 3.4 g/dL / Pro: 5.3 g/dL / ALK PHOS: 85 U/L / ALT: 6 U/L / AST: 13 U/L / GGT: x                                                                                                                                       MEDICATIONS  (STANDING):  chlorhexidine 2% Cloths 1 Application(s) Topical <User Schedule>  folic acid 1 milliGRAM(s) Oral daily  pantoprazole Infusion 8 mG/Hr (10 mL/Hr) IV Continuous <Continuous>  sodium bicarbonate 650 milliGRAM(s) Oral every 8 hours  sodium zirconium cyclosilicate 10 Gram(s) Oral every 8 hours        
LATRICIA ARREAGA 77y Female  MRN#: [id number]   Hospital Day: 2    HPI:  77F w/ h/o prior GI bleed (due to AVM's and duodenal ulcers), severe AS, and CKD p/w hematemesis. Symptoms started yesterday. Patient was in usual state of health when she felt sudden, sharp, lower abdominal pain. After this, pt had multiple episodes of hematemesis (reportedly ~2 pints over 2 hours), thereby prompting ED evaluation. Abdominal pain and nausea have improved since vomiting, but still persist. Has BM's and able to pass flatus as of yesterday. No bloody BM's or melena noted. Despite multiple admissions related to GI bleeds, pt reports never experiencing hematemesis before. No reported AC or AP use. No reported fevers, chills, CP, palpitations, SOB, dysuria, or LE swelling.    In ED, pt hypotensive (BP 82/53) on vitals. Labs demonstrated Hb 6.7 (Hb 8.5 two weeks ago) and K 6.0. EKG demonstrated prolonged QTc (513ms), but no acute changes a/w hyperkalemia. CTA AP negative for active GI bleed. GI consulted and recommended admission to MICU w/ plan for EGD in AM. S/P 1L LR bolus, 2u pRBC, pantoprazole 80mg IV plus gtt (8mg/hr) insulin 10u IV, D50% 25g IV, calcium gluconate 2g IV, Mg 2g IV, Reglan 10mg IV, and Zofran 4mg IV. Repeat /82 after fluids and pRBC's. Admitted to MICU for upper GI bleed. (17 Jul 2023 04:29)      SUBJECTIVE  Patient is a 77y old Female who presents with a chief complaint of hematemesis (17 Jul 2023 11:26)  Currently admitted to medicine with the primary diagnosis of GI bleed      INTERVAL HPI AND OVERNIGHT EVENTS:  Patient was examined and seen at bedside. This morning she is resting comfortably in bed and reports no issues or overnight events.    REVIEW OF SYMPTOMS:  CONSTITUTIONAL: No weakness, fevers or chills; No headaches  EYES: No visual changes, eye pain, or discharge  ENT: No vertigo; No ear pain or change in hearing; No sore throat or difficulty swallowing  NECK: No pain or stiffness  RESPIRATORY: No cough, wheezing, or hemoptysis; No shortness of breath  CARDIOVASCULAR: No chest pain or palpitations  GASTROINTESTINAL: No abdominal or epigastric pain; No nausea, vomiting, or hematemesis; No diarrhea or constipation; No melena or hematochezia  GENITOURINARY: No dysuria, frequency or hematuria  MUSCULOSKELETAL: No joint pain, no muscle pain, no weakness  NEUROLOGICAL: No numbness or weakness  SKIN: No itching or rashes    OBJECTIVE  PAST MEDICAL & SURGICAL HISTORY  -HTN (hypertension)  -Heart murmur  -Eczema  -Anemia  iron infusions and PRBC transfusions  -Aortic stenosis  -Chronic kidney disease (CKD)  -2019 novel coronavirus disease (COVID-19)  -4/2020- REHAB admission  -Gastric AVM  -H/O appendicitis  -S/P cholecystectomy  -History of esophagogastroduodenoscopy (EGD)  -H/O colonoscopy      ALLERGIES:  aspirin (Rash; Other)  EKG leads (Hives)  penicillins (Rash; Urticaria; Hives; Blisters)  latex (Unknown)    MEDICATIONS:  STANDING MEDICATIONS  chlorhexidine 2% Cloths 1 Application(s) Topical <User Schedule>  folic acid 1 milliGRAM(s) Oral daily  pantoprazole Infusion 8 mG/Hr IV Continuous <Continuous>  sodium bicarbonate 650 milliGRAM(s) Oral every 8 hours  sodium zirconium cyclosilicate 10 Gram(s) Oral every 8 hours    PRN MEDICATIONS      VITAL SIGNS: Last 24 Hours  ICU Vital Signs Last 24 Hrs  T(C): 36.7 (18 Jul 2023 08:00), Max: 37 (17 Jul 2023 16:00)  T(F): 98.1 (18 Jul 2023 08:00), Max: 98.6 (17 Jul 2023 16:00)  HR: 67 (18 Jul 2023 10:00) (64 - 84)  BP: 114/57 (18 Jul 2023 08:00) (102/57 - 128/58)  BP(mean): 82 (18 Jul 2023 08:00) (73 - 95)  ABP: --  ABP(mean): --  RR: 17 (18 Jul 2023 10:00) (17 - 31)  SpO2: 99% (18 Jul 2023 08:00) (91% - 100%)    O2 Parameters below as of 18 Jul 2023 08:00  Patient On (Oxygen Delivery Method): room air            LABS:               LABS:                          7.7    4.23  )-----------( 91       ( 18 Jul 2023 11:00 )             24.1     07-18    143  |  115<H>  |  70<HH>  ----------------------------<  83  5.0   |  20  |  2.6<H>    Ca    8.4      18 Jul 2023 10:22  Phos  2.7     07-17  Mg     2.6     07-17    TPro  4.9<L>  /  Alb  3.0<L>  /  TBili  1.2  /  DBili  x   /  AST  10  /  ALT  5   /  AlkPhos  74  07-18    LIVER FUNCTIONS - ( 18 Jul 2023 10:22 )  Alb: 3.0 g/dL / Pro: 4.9 g/dL / ALK PHOS: 74 U/L / ALT: 5 U/L / AST: 10 U/L / GGT: x           PT/INR - ( 18 Jul 2023 10:22 )   PT: 11.70 sec;   INR: 1.03 ratio         PTT - ( 18 Jul 2023 10:22 )  PTT:30.9 sec  Urinalysis Basic - ( 18 Jul 2023 10:22 )    Color: x / Appearance: x / SG: x / pH: x  Gluc: 83 mg/dL / Ketone: x  / Bili: x / Urobili: x   Blood: x / Protein: x / Nitrite: x   Leuk Esterase: x / RBC: x / WBC x   Sq Epi: x / Non Sq Epi: x / Bacteria: x              PHYSICAL EXAM:  CONSTITUTIONAL: No acute distress, well-developed, well-groomed, AAOx3  HEAD: Atraumatic, normocephalic  EYES: EOM intact, PERRLA, conjunctiva and sclera clear  ENT: Supple, no masses, no thyromegaly, no bruits, no JVD; moist mucous membranes  PULMONARY: Clear to auscultation bilaterally; no wheezes, rales, or rhonchi  CARDIOVASCULAR: Regular rate and rhythm; no murmurs, rubs, or gallops  GASTROINTESTINAL: Soft, non-tender, non-distended; bowel sounds present  MUSCULOSKELETAL: 2+ peripheral pulses; no clubbing, no cyanosis, no edema  NEUROLOGY: non-focal  SKIN: No rashes or lesions; warm and dry    
LATRICIA ARREAGA 77y Female  MRN#: [id number]   Hospital Day:     HPI:  77F w/ h/o prior GI bleed (due to AVM's and duodenal ulcers), severe AS, and CKD p/w hematemesis. Symptoms started yesterday. Patient was in usual state of health when she felt sudden, sharp, lower abdominal pain. After this, pt had multiple episodes of hematemesis (reportedly ~2 pints over 2 hours), thereby prompting ED evaluation. Abdominal pain and nausea have improved since vomiting, but still persist. Has BM's and able to pass flatus as of yesterday. No bloody BM's or melena noted. Despite multiple admissions related to GI bleeds, pt reports never experiencing hematemesis before. No reported AC or AP use. No reported fevers, chills, CP, palpitations, SOB, dysuria, or LE swelling.    In ED, pt hypotensive (BP 82/53) on vitals. Labs demonstrated Hb 6.7 (Hb 8.5 two weeks ago) and K 6.0. EKG demonstrated prolonged QTc (513ms), but no acute changes a/w hyperkalemia. CTA AP negative for active GI bleed. GI consulted and recommended admission to MICU w/ plan for EGD in AM. S/P 1L LR bolus, 2u pRBC, pantoprazole 80mg IV plus gtt (8mg/hr) insulin 10u IV, D50% 25g IV, calcium gluconate 2g IV, Mg 2g IV, Reglan 10mg IV, and Zofran 4mg IV. Repeat /82 after fluids and pRBC's. Admitted to MICU for upper GI bleed. (17 Jul 2023 04:29)      SUBJECTIVE  Patient is a 77y old Female who presents with a chief complaint of hematemesis (17 Jul 2023 11:26)  Currently admitted to medicine with the primary diagnosis of GI bleed      INTERVAL HPI AND OVERNIGHT EVENTS:  Patient was examined and seen at bedside. This morning she is resting comfortably in bed and reports no issues or overnight events.    REVIEW OF SYMPTOMS:  CONSTITUTIONAL: No weakness, fevers or chills; No headaches  EYES: No visual changes, eye pain, or discharge  ENT: No vertigo; No ear pain or change in hearing; No sore throat or difficulty swallowing  NECK: No pain or stiffness  RESPIRATORY: No cough, wheezing, or hemoptysis; No shortness of breath  CARDIOVASCULAR: No chest pain or palpitations  GASTROINTESTINAL: No abdominal or epigastric pain; No nausea, vomiting, or hematemesis; No diarrhea or constipation; No melena or hematochezia  GENITOURINARY: No dysuria, frequency or hematuria  MUSCULOSKELETAL: No joint pain, no muscle pain, no weakness  NEUROLOGICAL: No numbness or weakness  SKIN: No itching or rashes    OBJECTIVE  PAST MEDICAL & SURGICAL HISTORY  -HTN (hypertension)  -Heart murmur  -Eczema  -Anemia  iron infusions and PRBC transfusions  -Aortic stenosis  -Chronic kidney disease (CKD)  -2019 novel coronavirus disease (COVID-19)  -4/2020- REHAB admission  -Gastric AVM  -H/O appendicitis  -S/P cholecystectomy  -History of esophagogastroduodenoscopy (EGD)  -H/O colonoscopy      ALLERGIES:  aspirin (Rash; Other)  EKG leads (Hives)  penicillins (Rash; Urticaria; Hives; Blisters)  latex (Unknown)    MEDICATIONS:  STANDING MEDICATIONS  chlorhexidine 2% Cloths 1 Application(s) Topical <User Schedule>  folic acid 1 milliGRAM(s) Oral daily  pantoprazole Infusion 8 mG/Hr IV Continuous <Continuous>  sodium bicarbonate 650 milliGRAM(s) Oral every 8 hours  sodium zirconium cyclosilicate 10 Gram(s) Oral every 8 hours    PRN MEDICATIONS      VITAL SIGNS: Last 24 Hours  T(C): 36.8 (17 Jul 2023 12:00), Max: 36.8 (17 Jul 2023 07:36)  T(F): 98.3 (17 Jul 2023 12:00), Max: 98.3 (17 Jul 2023 12:00)  HR: 75 (17 Jul 2023 16:00) (66 - 86)  BP: 117/56 (17 Jul 2023 16:00) (82/53 - 125/82)  BP(mean): 80 (17 Jul 2023 16:00) (77 - 95)  RR: 17 (17 Jul 2023 16:00) (17 - 28)  SpO2: 78% (17 Jul 2023 16:00) (74% - 100%)    LABS:                        8.9    2.96  )-----------( 107      ( 17 Jul 2023 10:37 )             27.8     07-17    141  |  113<H>  |  75<HH>  ----------------------------<  129<H>  6.1<HH>   |  19  |  2.5<H>    Ca    9.0      17 Jul 2023 11:49  Phos  2.7     07-17  Mg     2.6     07-17    TPro  5.3<L>  /  Alb  3.4<L>  /  TBili  1.0  /  DBili  x   /  AST  13  /  ALT  6   /  AlkPhos  85  07-17    PT/INR - ( 16 Jul 2023 20:30 )   PT: 13.00 sec;   INR: 1.14 ratio         PTT - ( 16 Jul 2023 20:30 )  PTT:30.5 sec  Urinalysis Basic - ( 17 Jul 2023 11:49 )    Color: x / Appearance: x / SG: x / pH: x  Gluc: 129 mg/dL / Ketone: x  / Bili: x / Urobili: x   Blood: x / Protein: x / Nitrite: x   Leuk Esterase: x / RBC: x / WBC x   Sq Epi: x / Non Sq Epi: x / Bacteria: x        Troponin T, Serum: <0.01 ng/mL (07-16-23 @ 20:30)      CARDIAC MARKERS ( 16 Jul 2023 20:30 )  x     / <0.01 ng/mL / x     / x     / x              PHYSICAL EXAM:  CONSTITUTIONAL: No acute distress, well-developed, well-groomed, AAOx3  HEAD: Atraumatic, normocephalic  EYES: EOM intact, PERRLA, conjunctiva and sclera clear  ENT: Supple, no masses, no thyromegaly, no bruits, no JVD; moist mucous membranes  PULMONARY: Clear to auscultation bilaterally; no wheezes, rales, or rhonchi  CARDIOVASCULAR: Regular rate and rhythm; no murmurs, rubs, or gallops  GASTROINTESTINAL: Soft, non-tender, non-distended; bowel sounds present  MUSCULOSKELETAL: 2+ peripheral pulses; no clubbing, no cyanosis, no edema  NEUROLOGY: non-focal  SKIN: No rashes or lesions; warm and dry    
Over Night Events: events noted, afebrile, renal noted    PHYSICAL EXAM    ICU Vital Signs Last 24 Hrs  T(C): 36 (20 Jul 2023 04:00), Max: 36.5 (19 Jul 2023 12:00)  T(F): 96.8 (20 Jul 2023 04:00), Max: 97.7 (19 Jul 2023 12:00)  HR: 80 (20 Jul 2023 06:00) (66 - 81)  BP: 122/60 (20 Jul 2023 06:00) (114/58 - 143/62)  BP(mean): 87 (20 Jul 2023 06:00) (77 - 91)  RR: 22 (20 Jul 2023 06:00) (15 - 26)  SpO2: 98% (20 Jul 2023 06:00) (94% - 100%)    O2 Parameters below as of 20 Jul 2023 06:00  Patient On (Oxygen Delivery Method): room air            General: comfortable  Lungs: dec bs both bases  Cardiovascular: KATHYA 2.6   Abdomen: Soft, Positive BS  Extremities: No clubbing   Neurological: Non focal       07-18-23 @ 07:01  -  07-19-23 @ 07:00  --------------------------------------------------------  IN:    Oral Fluid: 720 mL  Total IN: 720 mL    OUT:    Blood Loss (mL): 1 mL    Voided (mL): 1250 mL  Total OUT: 1251 mL    Total NET: -531 mL      07-19-23 @ 07:01  -  07-20-23 @ 06:34  --------------------------------------------------------  IN:    Oral Fluid: 640 mL  Total IN: 640 mL    OUT:    Voided (mL): 1050 mL  Total OUT: 1050 mL    Total NET: -410 mL          LABS:                          7.9    3.87  )-----------( 99       ( 19 Jul 2023 06:33 )             25.5                                               07-19    140  |  113<H>  |  64<HH>  ----------------------------<  81  4.5   |  20  |  2.5<H>    Ca    8.3<L>      19 Jul 2023 06:33  Mg     2.2     07-19    TPro  5.0<L>  /  Alb  3.1<L>  /  TBili  1.2  /  DBili  x   /  AST  11  /  ALT  5   /  AlkPhos  74  07-19      PT/INR - ( 18 Jul 2023 10:22 )   PT: 11.70 sec;   INR: 1.03 ratio         PTT - ( 18 Jul 2023 10:22 )  PTT:30.9 sec                                       Urinalysis Basic - ( 19 Jul 2023 06:33 )    Color: x / Appearance: x / SG: x / pH: x  Gluc: 81 mg/dL / Ketone: x  / Bili: x / Urobili: x   Blood: x / Protein: x / Nitrite: x   Leuk Esterase: x / RBC: x / WBC x   Sq Epi: x / Non Sq Epi: x / Bacteria: x                                                  LIVER FUNCTIONS - ( 19 Jul 2023 06:33 )  Alb: 3.1 g/dL / Pro: 5.0 g/dL / ALK PHOS: 74 U/L / ALT: 5 U/L / AST: 11 U/L / GGT: x                                                                                                                                       MEDICATIONS  (STANDING):  chlorhexidine 2% Cloths 1 Application(s) Topical <User Schedule>  folic acid 1 milliGRAM(s) Oral daily  pantoprazole    Tablet 40 milliGRAM(s) Oral every 12 hours  sodium bicarbonate 650 milliGRAM(s) Oral every 8 hours    MEDICATIONS  (PRN):  diphenhydrAMINE 25 milliGRAM(s) Oral every 12 hours PRN Rash and/or Itching          
Over Night Events: events noted, on ra, no bloody BM    PHYSICAL EXAM    ICU Vital Signs Last 24 Hrs  T(C): 36.2 (19 Jul 2023 04:00), Max: 36.6 (18 Jul 2023 16:00)  T(F): 97.2 (19 Jul 2023 04:00), Max: 97.8 (18 Jul 2023 16:00)  HR: 66 (19 Jul 2023 07:00) (60 - 78)  BP: 126/60 (19 Jul 2023 07:00) (100/56 - 127/60)  BP(mean): 87 (19 Jul 2023 07:00) (70 - 87)  RR: 23 (19 Jul 2023 07:00) (14 - 30)  SpO2: 99% (19 Jul 2023 07:00) (98% - 100%)    O2 Parameters below as of 19 Jul 2023 06:00  Patient On (Oxygen Delivery Method): room air            General: comfortable  Lungs: Bilateral BS  Cardiovascular: Regular   Abdomen: Soft, Positive BS  Extremities: No clubbing   Skin: Warm  Neurological: Non focal       07-18-23 @ 07:01  -  07-19-23 @ 07:00  --------------------------------------------------------  IN:    Oral Fluid: 720 mL  Total IN: 720 mL    OUT:    Blood Loss (mL): 1 mL    Voided (mL): 1250 mL  Total OUT: 1251 mL    Total NET: -531 mL          LABS:                          7.9    3.87  )-----------( 99       ( 19 Jul 2023 06:33 )             25.5                                               07-19    140  |  113<H>  |  64<HH>  ----------------------------<  81  4.5   |  20  |  2.5<H>    Ca    8.3<L>      19 Jul 2023 06:33  Phos  2.7     07-17  Mg     2.2     07-19    TPro  5.0<L>  /  Alb  3.1<L>  /  TBili  1.2  /  DBili  x   /  AST  11  /  ALT  5   /  AlkPhos  74  07-19      PT/INR - ( 18 Jul 2023 10:22 )   PT: 11.70 sec;   INR: 1.03 ratio         PTT - ( 18 Jul 2023 10:22 )  PTT:30.9 sec                                       Urinalysis Basic - ( 19 Jul 2023 06:33 )    Color: x / Appearance: x / SG: x / pH: x  Gluc: 81 mg/dL / Ketone: x  / Bili: x / Urobili: x   Blood: x / Protein: x / Nitrite: x   Leuk Esterase: x / RBC: x / WBC x   Sq Epi: x / Non Sq Epi: x / Bacteria: x                                                  LIVER FUNCTIONS - ( 19 Jul 2023 06:33 )  Alb: 3.1 g/dL / Pro: 5.0 g/dL / ALK PHOS: 74 U/L / ALT: 5 U/L / AST: 11 U/L / GGT: x                                                                                                                                       MEDICATIONS  (STANDING):  chlorhexidine 2% Cloths 1 Application(s) Topical <User Schedule>  folic acid 1 milliGRAM(s) Oral daily  pantoprazole  Injectable 40 milliGRAM(s) IV Push every 12 hours  sodium bicarbonate 650 milliGRAM(s) Oral every 8 hours  sodium zirconium cyclosilicate 10 Gram(s) Oral every 8 hours    MEDICATIONS  (PRN):  diphenhydrAMINE 25 milliGRAM(s) Oral every 12 hours PRN Rash and/or Itching

## 2023-07-20 NOTE — DISCHARGE NOTE PROVIDER - CARE PROVIDER_API CALL
Gildardo Bill  Internal Medicine  [address] [place] [id number]  Phone: [phone]  Fax: [phone]  Follow Up Time: 1-3 days

## 2023-07-20 NOTE — DISCHARGE NOTE PROVIDER - HOSPITAL COURSE
77F w/ h/o chronic anemia requiring transfusions (has a port, follows Dr. 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 GI consulted for acute anemia and hematemesis. Patient was hypotensive on admission. transfused 2 units and repeat hb was 8.9  GI deferred the EGD as no active bleed and pt is high risk for the procedure.    #UGIB likely d/t AVM vs Erosive Hemorrhagic   - resolved, no active bleed  - Hx of prior GI bleed with AVMs in stomach , duodenum, dieulafoy lesion in D3   - Hemodynamically stable & no active bleeding  - Baseline hemoglobin - 8  - Hemoglobin on admission - 6.7 (6/1 Hb was 8.5)>2u >8.9  - Today Hb: 7.4 dropped from 7.9  - BUN /Cr: 57/2.4 ( as of June: 58/2.8)  - Coags - 1.03 INR  - not on any  Antithrombotic or  Antithrombotic   - CT Angio AP: no active bleed  - no EGD as per GI, as risk outweighs benefit  -pt was observed in ICU, her hemoglobin dropped slowly over time    #Hyperkalemia  -resolved  - K: 6.1, was started on lokelma and stat Ca gluconate, k(7/19): 4.5  - d/c lokelma    #Severe AS  - f/u cardio as out patient    #CKD stage 4  - creat at baseline 2.5  - lasix held d/t hypotension post UGIB   - c/w sodium bicarb PO  - f/u nephro as out patient     I spoke with the pt about decreasing hemoglobin,  she needs further evaluation and observation given her recent UGIB, but she refuses to stay at hospital and being discharged AMA.

## 2023-07-20 NOTE — DISCHARGE NOTE PROVIDER - NSDCFUSCHEDAPPT_GEN_ALL_CORE_FT
Liu Gaston  [facility] PreAdmits  Scheduled Appointment: 07/21/2023    [facility]  AMBCHEMO SI [address]  Scheduled Appointment: 07/21/2023    [facility]  AMBCHEMO SI [address]  Scheduled Appointment: 07/28/2023    Liu Gaston  [facility] PreAdmits  Scheduled Appointment: 07/28/2023    Munir Paula  [facility]  CARDIOLOGY [address]  Scheduled Appointment: 08/08/2023    Reji Hernandez  [facility]  CARDIOLOGY [address]  Scheduled Appointment: 08/10/2023

## 2023-07-20 NOTE — DISCHARGE NOTE PROVIDER - NSDCCPCAREPLAN_GEN_ALL_CORE_FT
PRINCIPAL DISCHARGE DIAGNOSIS  Diagnosis: GI bleed  Assessment and Plan of Treatment: you came to us with complain of multiple episodes of bloody vomitting, you got 2 units of blood transfusion, GI saw you, EGD was planned but because of severe aortic stenosis, the risk of EGD outweighs the benefit, so procedure was deferred given stable clinical condition and no active bleed during the hopital course. we repeated blood hemoglobin levels everyday and your hemoglobin level dropped slowly over time for which we recommended you to stay at hospital for further evaluation and observation given decreasing hemoglobin.      SECONDARY DISCHARGE DIAGNOSES  Diagnosis: Anemia  Assessment and Plan of Treatment:     Diagnosis: Hyperkalemia  Assessment and Plan of Treatment:

## 2023-07-21 ENCOUNTER — OUTPATIENT (OUTPATIENT)
Dept: OUTPATIENT SERVICES | Facility: HOSPITAL | Age: 78
LOS: 1 days | End: 2023-07-21
Payer: MEDICARE

## 2023-07-21 ENCOUNTER — LABORATORY RESULT (OUTPATIENT)
Age: 78
End: 2023-07-21

## 2023-07-21 ENCOUNTER — APPOINTMENT (OUTPATIENT)
Dept: INFUSION THERAPY | Facility: CLINIC | Age: 78
End: 2023-07-21

## 2023-07-21 VITALS
TEMPERATURE: 98.1 F | RESPIRATION RATE: 18 BRPM | HEART RATE: 62 BPM | SYSTOLIC BLOOD PRESSURE: 125 MMHG | DIASTOLIC BLOOD PRESSURE: 60 MMHG

## 2023-07-21 DIAGNOSIS — D64.9 ANEMIA, UNSPECIFIED: ICD-10-CM

## 2023-07-21 DIAGNOSIS — Z98.890 OTHER SPECIFIED POSTPROCEDURAL STATES: Chronic | ICD-10-CM

## 2023-07-21 DIAGNOSIS — Z90.49 ACQUIRED ABSENCE OF OTHER SPECIFIED PARTS OF DIGESTIVE TRACT: Chronic | ICD-10-CM

## 2023-07-21 DIAGNOSIS — Z87.19 PERSONAL HISTORY OF OTHER DISEASES OF THE DIGESTIVE SYSTEM: Chronic | ICD-10-CM

## 2023-07-21 LAB
ABO + RH PNL BLD: NORMAL
HCT VFR BLD CALC: 24 %
HGB BLD-MCNC: 7.7 G/DL
MCHC RBC-ENTMCNC: 29.5 PG
MCHC RBC-ENTMCNC: 32.1 G/DL
MCV RBC AUTO: 92 FL
PLATELET # BLD AUTO: 97 K/UL
PMV BLD: 10.3 FL
RBC # BLD: 2.61 M/UL
RBC # FLD: 17 %
WBC # FLD AUTO: 3.34 K/UL

## 2023-07-21 PROCEDURE — 86900 BLOOD TYPING SEROLOGIC ABO: CPT

## 2023-07-21 PROCEDURE — 96372 THER/PROPH/DIAG INJ SC/IM: CPT

## 2023-07-21 PROCEDURE — 36415 COLL VENOUS BLD VENIPUNCTURE: CPT

## 2023-07-21 PROCEDURE — 86901 BLOOD TYPING SEROLOGIC RH(D): CPT

## 2023-07-21 PROCEDURE — 86850 RBC ANTIBODY SCREEN: CPT

## 2023-07-21 PROCEDURE — 86922 COMPATIBILITY TEST ANTIGLOB: CPT

## 2023-07-21 PROCEDURE — 85027 COMPLETE CBC AUTOMATED: CPT

## 2023-07-21 RX ORDER — ERYTHROPOIETIN 10000 [IU]/ML
30000 INJECTION, SOLUTION INTRAVENOUS; SUBCUTANEOUS ONCE
Refills: 0 | Status: COMPLETED | OUTPATIENT
Start: 2023-07-21 | End: 2023-07-21

## 2023-07-21 RX ADMIN — ERYTHROPOIETIN 30000 UNIT(S): 10000 INJECTION, SOLUTION INTRAVENOUS; SUBCUTANEOUS at 13:34

## 2023-07-22 DIAGNOSIS — D64.9 ANEMIA, UNSPECIFIED: ICD-10-CM

## 2023-07-24 ENCOUNTER — APPOINTMENT (OUTPATIENT)
Dept: INFUSION THERAPY | Facility: CLINIC | Age: 78
End: 2023-07-24

## 2023-07-24 ENCOUNTER — OUTPATIENT (OUTPATIENT)
Dept: OUTPATIENT SERVICES | Facility: HOSPITAL | Age: 78
LOS: 1 days | End: 2023-07-24
Payer: MEDICARE

## 2023-07-24 DIAGNOSIS — D64.9 ANEMIA, UNSPECIFIED: ICD-10-CM

## 2023-07-24 DIAGNOSIS — Z98.890 OTHER SPECIFIED POSTPROCEDURAL STATES: Chronic | ICD-10-CM

## 2023-07-24 DIAGNOSIS — Z87.19 PERSONAL HISTORY OF OTHER DISEASES OF THE DIGESTIVE SYSTEM: Chronic | ICD-10-CM

## 2023-07-24 DIAGNOSIS — Z90.49 ACQUIRED ABSENCE OF OTHER SPECIFIED PARTS OF DIGESTIVE TRACT: Chronic | ICD-10-CM

## 2023-07-24 PROCEDURE — 36430 TRANSFUSION BLD/BLD COMPNT: CPT

## 2023-07-24 PROCEDURE — P9040: CPT

## 2023-07-26 DIAGNOSIS — Z86.16 PERSONAL HISTORY OF COVID-19: ICD-10-CM

## 2023-07-26 DIAGNOSIS — E87.5 HYPERKALEMIA: ICD-10-CM

## 2023-07-26 DIAGNOSIS — N18.4 CHRONIC KIDNEY DISEASE, STAGE 4 (SEVERE): ICD-10-CM

## 2023-07-26 DIAGNOSIS — Z87.891 PERSONAL HISTORY OF NICOTINE DEPENDENCE: ICD-10-CM

## 2023-07-26 DIAGNOSIS — Z53.29 PROCEDURE AND TREATMENT NOT CARRIED OUT BECAUSE OF PATIENT'S DECISION FOR OTHER REASONS: ICD-10-CM

## 2023-07-26 DIAGNOSIS — I35.0 NONRHEUMATIC AORTIC (VALVE) STENOSIS: ICD-10-CM

## 2023-07-26 DIAGNOSIS — Z88.8 ALLERGY STATUS TO OTHER DRUGS, MEDICAMENTS AND BIOLOGICAL SUBSTANCES: ICD-10-CM

## 2023-07-26 DIAGNOSIS — I12.9 HYPERTENSIVE CHRONIC KIDNEY DISEASE WITH STAGE 1 THROUGH STAGE 4 CHRONIC KIDNEY DISEASE, OR UNSPECIFIED CHRONIC KIDNEY DISEASE: ICD-10-CM

## 2023-07-26 DIAGNOSIS — E66.01 MORBID (SEVERE) OBESITY DUE TO EXCESS CALORIES: ICD-10-CM

## 2023-07-26 DIAGNOSIS — K31.811 ANGIODYSPLASIA OF STOMACH AND DUODENUM WITH BLEEDING: ICD-10-CM

## 2023-07-26 DIAGNOSIS — D61.818 OTHER PANCYTOPENIA: ICD-10-CM

## 2023-07-26 DIAGNOSIS — Z88.0 ALLERGY STATUS TO PENICILLIN: ICD-10-CM

## 2023-07-28 ENCOUNTER — APPOINTMENT (OUTPATIENT)
Dept: INFUSION THERAPY | Facility: CLINIC | Age: 78
End: 2023-07-28

## 2023-08-04 ENCOUNTER — OUTPATIENT (OUTPATIENT)
Dept: OUTPATIENT SERVICES | Facility: HOSPITAL | Age: 78
LOS: 1 days | End: 2023-08-04
Payer: MEDICARE

## 2023-08-04 ENCOUNTER — APPOINTMENT (OUTPATIENT)
Dept: INFUSION THERAPY | Facility: CLINIC | Age: 78
End: 2023-08-04

## 2023-08-04 DIAGNOSIS — M79.604 PAIN IN RIGHT LEG: ICD-10-CM

## 2023-08-04 DIAGNOSIS — D64.9 ANEMIA, UNSPECIFIED: ICD-10-CM

## 2023-08-04 DIAGNOSIS — Z87.19 PERSONAL HISTORY OF OTHER DISEASES OF THE DIGESTIVE SYSTEM: Chronic | ICD-10-CM

## 2023-08-04 DIAGNOSIS — Z98.890 OTHER SPECIFIED POSTPROCEDURAL STATES: Chronic | ICD-10-CM

## 2023-08-04 DIAGNOSIS — Z90.49 ACQUIRED ABSENCE OF OTHER SPECIFIED PARTS OF DIGESTIVE TRACT: Chronic | ICD-10-CM

## 2023-08-04 DIAGNOSIS — I82.409 ACUTE EMBOLISM AND THROMBOSIS OF UNSPECIFIED DEEP VEINS OF UNSPECIFIED LOWER EXTREMITY: ICD-10-CM

## 2023-08-04 DIAGNOSIS — Z00.8 ENCOUNTER FOR OTHER GENERAL EXAMINATION: ICD-10-CM

## 2023-08-04 PROCEDURE — 93971 EXTREMITY STUDY: CPT | Mod: RT

## 2023-08-04 PROCEDURE — 93971 EXTREMITY STUDY: CPT | Mod: 26,RT

## 2023-08-04 PROCEDURE — 96372 THER/PROPH/DIAG INJ SC/IM: CPT

## 2023-08-04 PROCEDURE — 96365 THER/PROPH/DIAG IV INF INIT: CPT

## 2023-08-04 RX ORDER — ERYTHROPOIETIN 10000 [IU]/ML
30000 INJECTION, SOLUTION INTRAVENOUS; SUBCUTANEOUS ONCE
Refills: 0 | Status: DISCONTINUED | OUTPATIENT
Start: 2023-08-04 | End: 2023-08-04

## 2023-08-04 RX ORDER — ERYTHROPOIETIN 10000 [IU]/ML
30000 INJECTION, SOLUTION INTRAVENOUS; SUBCUTANEOUS ONCE
Refills: 0 | Status: COMPLETED | OUTPATIENT
Start: 2023-08-04 | End: 2023-08-04

## 2023-08-04 RX ORDER — IRON SUCROSE 20 MG/ML
200 INJECTION, SOLUTION INTRAVENOUS ONCE
Refills: 0 | Status: COMPLETED | OUTPATIENT
Start: 2023-08-04 | End: 2023-08-04

## 2023-08-04 RX ADMIN — ERYTHROPOIETIN 30000 UNIT(S): 10000 INJECTION, SOLUTION INTRAVENOUS; SUBCUTANEOUS at 13:14

## 2023-08-04 RX ADMIN — IRON SUCROSE 200 MILLIGRAM(S): 20 INJECTION, SOLUTION INTRAVENOUS at 13:45

## 2023-08-04 RX ADMIN — IRON SUCROSE 220 MILLIGRAM(S): 20 INJECTION, SOLUTION INTRAVENOUS at 13:14

## 2023-08-05 DIAGNOSIS — M79.604 PAIN IN RIGHT LEG: ICD-10-CM

## 2023-08-05 DIAGNOSIS — D64.9 ANEMIA, UNSPECIFIED: ICD-10-CM

## 2023-08-05 DIAGNOSIS — I82.409 ACUTE EMBOLISM AND THROMBOSIS OF UNSPECIFIED DEEP VEINS OF UNSPECIFIED LOWER EXTREMITY: ICD-10-CM

## 2023-08-08 ENCOUNTER — APPOINTMENT (OUTPATIENT)
Dept: CARDIOLOGY | Facility: CLINIC | Age: 78
End: 2023-08-08

## 2023-08-09 DIAGNOSIS — R11.0 NAUSEA: ICD-10-CM

## 2023-08-10 RX ORDER — ONDANSETRON 4 MG/1
4 TABLET ORAL EVERY 8 HOURS
Qty: 90 | Refills: 0 | Status: ACTIVE | COMMUNITY
Start: 2023-08-09 | End: 1900-01-01

## 2023-08-11 ENCOUNTER — OUTPATIENT (OUTPATIENT)
Dept: OUTPATIENT SERVICES | Facility: HOSPITAL | Age: 78
LOS: 1 days | End: 2023-08-11
Payer: MEDICARE

## 2023-08-11 ENCOUNTER — APPOINTMENT (OUTPATIENT)
Dept: INFUSION THERAPY | Facility: CLINIC | Age: 78
End: 2023-08-11

## 2023-08-11 DIAGNOSIS — Z98.890 OTHER SPECIFIED POSTPROCEDURAL STATES: Chronic | ICD-10-CM

## 2023-08-11 DIAGNOSIS — D64.9 ANEMIA, UNSPECIFIED: ICD-10-CM

## 2023-08-11 DIAGNOSIS — Z87.19 PERSONAL HISTORY OF OTHER DISEASES OF THE DIGESTIVE SYSTEM: Chronic | ICD-10-CM

## 2023-08-11 DIAGNOSIS — Z90.49 ACQUIRED ABSENCE OF OTHER SPECIFIED PARTS OF DIGESTIVE TRACT: Chronic | ICD-10-CM

## 2023-08-11 PROCEDURE — 96365 THER/PROPH/DIAG IV INF INIT: CPT

## 2023-08-11 PROCEDURE — 36430 TRANSFUSION BLD/BLD COMPNT: CPT

## 2023-08-11 PROCEDURE — 96372 THER/PROPH/DIAG INJ SC/IM: CPT

## 2023-08-11 PROCEDURE — P9040: CPT

## 2023-08-11 RX ORDER — IRON SUCROSE 20 MG/ML
200 INJECTION, SOLUTION INTRAVENOUS ONCE
Refills: 0 | Status: COMPLETED | OUTPATIENT
Start: 2023-08-11 | End: 2023-08-11

## 2023-08-11 RX ORDER — ERYTHROPOIETIN 10000 [IU]/ML
30000 INJECTION, SOLUTION INTRAVENOUS; SUBCUTANEOUS ONCE
Refills: 0 | Status: COMPLETED | OUTPATIENT
Start: 2023-08-11 | End: 2023-08-11

## 2023-08-11 RX ORDER — ONDANSETRON 8 MG/1
8 TABLET, FILM COATED ORAL ONCE
Refills: 0 | Status: COMPLETED | OUTPATIENT
Start: 2023-08-11 | End: 2023-08-11

## 2023-08-11 RX ADMIN — ONDANSETRON 8 MILLIGRAM(S): 8 TABLET, FILM COATED ORAL at 14:46

## 2023-08-11 RX ADMIN — IRON SUCROSE 220 MILLIGRAM(S): 20 INJECTION, SOLUTION INTRAVENOUS at 12:29

## 2023-08-11 RX ADMIN — ERYTHROPOIETIN 30000 UNIT(S): 10000 INJECTION, SOLUTION INTRAVENOUS; SUBCUTANEOUS at 12:28

## 2023-08-11 RX ADMIN — IRON SUCROSE 200 MILLIGRAM(S): 20 INJECTION, SOLUTION INTRAVENOUS at 13:00

## 2023-08-18 ENCOUNTER — APPOINTMENT (OUTPATIENT)
Dept: INFUSION THERAPY | Facility: CLINIC | Age: 78
End: 2023-08-18

## 2023-08-18 ENCOUNTER — OUTPATIENT (OUTPATIENT)
Dept: OUTPATIENT SERVICES | Facility: HOSPITAL | Age: 78
LOS: 1 days | End: 2023-08-18
Payer: MEDICARE

## 2023-08-18 DIAGNOSIS — D64.9 ANEMIA, UNSPECIFIED: ICD-10-CM

## 2023-08-18 DIAGNOSIS — Z90.49 ACQUIRED ABSENCE OF OTHER SPECIFIED PARTS OF DIGESTIVE TRACT: Chronic | ICD-10-CM

## 2023-08-18 DIAGNOSIS — Z98.890 OTHER SPECIFIED POSTPROCEDURAL STATES: Chronic | ICD-10-CM

## 2023-08-18 PROCEDURE — 96372 THER/PROPH/DIAG INJ SC/IM: CPT

## 2023-08-18 PROCEDURE — 96365 THER/PROPH/DIAG IV INF INIT: CPT

## 2023-08-18 RX ORDER — IRON SUCROSE 20 MG/ML
200 INJECTION, SOLUTION INTRAVENOUS ONCE
Refills: 0 | Status: COMPLETED | OUTPATIENT
Start: 2023-08-18 | End: 2023-08-18

## 2023-08-18 RX ORDER — ERYTHROPOIETIN 10000 [IU]/ML
30000 INJECTION, SOLUTION INTRAVENOUS; SUBCUTANEOUS ONCE
Refills: 0 | Status: COMPLETED | OUTPATIENT
Start: 2023-08-18 | End: 2023-08-18

## 2023-08-18 RX ADMIN — IRON SUCROSE 220 MILLIGRAM(S): 20 INJECTION, SOLUTION INTRAVENOUS at 12:50

## 2023-08-18 RX ADMIN — ERYTHROPOIETIN 30000 UNIT(S): 10000 INJECTION, SOLUTION INTRAVENOUS; SUBCUTANEOUS at 12:50

## 2023-08-18 RX ADMIN — IRON SUCROSE 200 MILLIGRAM(S): 20 INJECTION, SOLUTION INTRAVENOUS at 13:22

## 2023-08-22 NOTE — H&P ADULT - ATTENDING COMMENTS
78 YO F w/ a PMH of HTN, HLD, CKD4, Severe AS, HFpEF w/ mod Pulm HTN, persistent Pancytopenia thought to be from GIB, and hx GIB w/ multiple AVMs who was BIBEMS for eval of worsening SOB for the past x 2 weeks. Associated with B/L LE swelling. + orthopnea, + chest tightness. Denies any fevers/chills, CP, palpitations, N/V/D, ABD pain, dysuria, headaches, or rashes.     In the ED, Chest X-ray shows new left basilar opacity. BNP elevated. Started on IV Lasix and IV ABXs (Vanc) in the ED.     FMHx:   -No family Hx of early cardiac death, CAD, asthma, or genetic disorders identified    Physical exam shows pt in NAD. VSS, afebrile, not hypoxic on 3L NC. A&Ox3. Non-focal neuro exam. Muscle strength/sensation intact. CTA B/L w/ no W/C/R. RRR, no M/G/R. ABD is soft and non-tender, normoactive BSs. B/L LEs with 1+ pitting edema that extends to the mid-shin, the RLE has erythema over the anterior aspect w/ fluid filled blisters. No rashes. Labs and radiology as above.    Dyspnea due to acute on chronic HFpEF. IV Lasix and transition to PO. Echo. Monitor daily weights, Is&Os, and diet/fluid restriction. BMP in the AM. Restart home meds.     RLE venous stasis dermatitis; no sepsis present on admission. IV Lasix and transition to PO. A1c/Lipids. ESR/CRP. Elevation of LEs. Compression wraps with ACE bandages. Topical CSs. Silver Sulfadiazine on open ulcerations. PRN pain meds. Wash LEs daily with soap and water. Elevate LEs. FU official LE doppler results. On discharge, give pt an Rx for static compression wraps and vascular Out-pt FU.   -Pt should be covered w/ IV ABXs due to pancytopenia    Hyperkalemia. Treat now. Repeat BMP in the AM.     Pancytopenia, chronic. Heme/Onc consult     Hypoalbuminemia, from poor oral intake. Nutrition eval.     Hx of HTN, HLD, CKD4, Severe AS, persistent Pancytopenia thought to be from GIB, and hx GIB w/ multiple AVMs. Restart home meds, except as stated above. DVT PPX. Inform PCP of pt's admission to hospital. My note supersedes the residents note.     Date seen by Attending: 3/21/23 normal...

## 2023-08-23 ENCOUNTER — TRANSCRIPTION ENCOUNTER (OUTPATIENT)
Age: 78
End: 2023-08-23

## 2023-08-23 ENCOUNTER — OUTPATIENT (OUTPATIENT)
Dept: OUTPATIENT SERVICES | Facility: HOSPITAL | Age: 78
LOS: 1 days | Discharge: HOME | End: 2023-08-23
Payer: MEDICARE

## 2023-08-23 ENCOUNTER — RESULT REVIEW (OUTPATIENT)
Age: 78
End: 2023-08-23

## 2023-08-23 VITALS
SYSTOLIC BLOOD PRESSURE: 109 MMHG | HEIGHT: 67 IN | HEART RATE: 59 BPM | DIASTOLIC BLOOD PRESSURE: 47 MMHG | WEIGHT: 227.96 LBS | TEMPERATURE: 97 F | RESPIRATION RATE: 18 BRPM

## 2023-08-23 VITALS
RESPIRATION RATE: 18 BRPM | DIASTOLIC BLOOD PRESSURE: 66 MMHG | OXYGEN SATURATION: 100 % | SYSTOLIC BLOOD PRESSURE: 143 MMHG | HEART RATE: 56 BPM

## 2023-08-23 DIAGNOSIS — Z90.49 ACQUIRED ABSENCE OF OTHER SPECIFIED PARTS OF DIGESTIVE TRACT: Chronic | ICD-10-CM

## 2023-08-23 DIAGNOSIS — R11.0 NAUSEA: ICD-10-CM

## 2023-08-23 DIAGNOSIS — Z98.890 OTHER SPECIFIED POSTPROCEDURAL STATES: Chronic | ICD-10-CM

## 2023-08-23 DIAGNOSIS — Z98.891 HISTORY OF UTERINE SCAR FROM PREVIOUS SURGERY: Chronic | ICD-10-CM

## 2023-08-23 DIAGNOSIS — D64.9 ANEMIA, UNSPECIFIED: ICD-10-CM

## 2023-08-23 DIAGNOSIS — Z87.19 PERSONAL HISTORY OF OTHER DISEASES OF THE DIGESTIVE SYSTEM: Chronic | ICD-10-CM

## 2023-08-23 DIAGNOSIS — Z95.828 PRESENCE OF OTHER VASCULAR IMPLANTS AND GRAFTS: Chronic | ICD-10-CM

## 2023-08-23 PROCEDURE — 88305 TISSUE EXAM BY PATHOLOGIST: CPT

## 2023-08-23 PROCEDURE — 88305 TISSUE EXAM BY PATHOLOGIST: CPT | Mod: 26

## 2023-08-23 PROCEDURE — C1889: CPT

## 2023-08-23 PROCEDURE — 43255 EGD CONTROL BLEEDING ANY: CPT | Mod: 59

## 2023-08-23 PROCEDURE — 43254 EGD ENDO MUCOSAL RESECTION: CPT

## 2023-08-23 NOTE — ASU PATIENT PROFILE, ADULT - NSICDXPASTSURGICALHX_GEN_ALL_CORE_FT
PAST SURGICAL HISTORY:  H/O appendicitis     H/O  section     H/O colonoscopy     History of appendectomy     History of esophagogastroduodenoscopy (EGD)     Port-A-Cath in place right CW    S/P cholecystectomy

## 2023-08-24 ENCOUNTER — APPOINTMENT (OUTPATIENT)
Dept: INFUSION THERAPY | Facility: CLINIC | Age: 78
End: 2023-08-24

## 2023-08-24 ENCOUNTER — APPOINTMENT (OUTPATIENT)
Dept: HEMATOLOGY ONCOLOGY | Facility: CLINIC | Age: 78
End: 2023-08-24
Payer: MEDICARE

## 2023-08-24 ENCOUNTER — OUTPATIENT (OUTPATIENT)
Dept: OUTPATIENT SERVICES | Facility: HOSPITAL | Age: 78
LOS: 1 days | End: 2023-08-24
Payer: MEDICARE

## 2023-08-24 ENCOUNTER — LABORATORY RESULT (OUTPATIENT)
Age: 78
End: 2023-08-24

## 2023-08-24 ENCOUNTER — APPOINTMENT (OUTPATIENT)
Dept: INFUSION THERAPY | Facility: CLINIC | Age: 78
End: 2023-08-24
Payer: MEDICARE

## 2023-08-24 VITALS
DIASTOLIC BLOOD PRESSURE: 54 MMHG | HEART RATE: 60 BPM | SYSTOLIC BLOOD PRESSURE: 119 MMHG | HEIGHT: 67 IN | WEIGHT: 222 LBS | BODY MASS INDEX: 34.84 KG/M2 | OXYGEN SATURATION: 99 % | TEMPERATURE: 98.6 F | RESPIRATION RATE: 16 BRPM

## 2023-08-24 DIAGNOSIS — Z90.49 ACQUIRED ABSENCE OF OTHER SPECIFIED PARTS OF DIGESTIVE TRACT: Chronic | ICD-10-CM

## 2023-08-24 DIAGNOSIS — Z87.19 PERSONAL HISTORY OF OTHER DISEASES OF THE DIGESTIVE SYSTEM: Chronic | ICD-10-CM

## 2023-08-24 DIAGNOSIS — D64.9 ANEMIA, UNSPECIFIED: ICD-10-CM

## 2023-08-24 DIAGNOSIS — Z95.828 PRESENCE OF OTHER VASCULAR IMPLANTS AND GRAFTS: Chronic | ICD-10-CM

## 2023-08-24 DIAGNOSIS — Z98.890 OTHER SPECIFIED POSTPROCEDURAL STATES: Chronic | ICD-10-CM

## 2023-08-24 DIAGNOSIS — Z98.891 HISTORY OF UTERINE SCAR FROM PREVIOUS SURGERY: Chronic | ICD-10-CM

## 2023-08-24 LAB
HCT VFR BLD CALC: 25.5 %
HGB BLD-MCNC: 7.7 G/DL
MCHC RBC-ENTMCNC: 29.5 PG
MCHC RBC-ENTMCNC: 30.2 G/DL
MCV RBC AUTO: 97.7 FL
PLATELET # BLD AUTO: 102 K/UL
PMV BLD: 9.9 FL
RBC # BLD: 2.61 M/UL
RBC # FLD: 17.7 %
SURGICAL PATHOLOGY STUDY: SIGNIFICANT CHANGE UP
WBC # FLD AUTO: 2.48 K/UL

## 2023-08-24 PROCEDURE — 99213 OFFICE O/P EST LOW 20 MIN: CPT

## 2023-08-24 PROCEDURE — 82728 ASSAY OF FERRITIN: CPT

## 2023-08-24 PROCEDURE — 99213 OFFICE O/P EST LOW 20 MIN: CPT | Mod: 25

## 2023-08-24 PROCEDURE — 96372 THER/PROPH/DIAG INJ SC/IM: CPT

## 2023-08-24 PROCEDURE — 85027 COMPLETE CBC AUTOMATED: CPT

## 2023-08-24 RX ORDER — ERYTHROPOIETIN 10000 [IU]/ML
30000 INJECTION, SOLUTION INTRAVENOUS; SUBCUTANEOUS ONCE
Refills: 0 | Status: COMPLETED | OUTPATIENT
Start: 2023-08-24 | End: 2023-08-24

## 2023-08-24 RX ADMIN — ERYTHROPOIETIN 30000 UNIT(S): 10000 INJECTION, SOLUTION INTRAVENOUS; SUBCUTANEOUS at 13:59

## 2023-08-24 NOTE — PHYSICAL EXAM
[Obese] : obese [Normal] : normoactive bowel sounds, soft and nontender, no hepatosplenomegaly or masses appreciated [de-identified] : grade 3/6 systolic murmur  [de-identified] : lower extremity edema ( R > L ) 1 plus

## 2023-08-24 NOTE — HISTORY OF PRESENT ILLNESS
[de-identified] : bruce is a 74 year old white female with hypertension, chronic kidney disease, morbidly obese presents today with normocytic anemia. \par  Her most recent CBC 04/01/2020 shows WBC -5.66, HB of 6.7, MCV -86.3, RDW - 17.3, platelet count- 196. Her Hb was normal until 08/2019. In October 2019 she noticed black tarry stools and was feeling tired. She went to ER and her Hb was 5.3. She was given 3 units of PRBC. Her iron studies at that showed ferritin of 8 and percent saturation of 5. She had EGD and colonoscopy which did not reveal any bleeding lesions. Following that event as per patient she was not given iron (?not sure why). She was readmitted to hospital in 01/2020 for SOB on exertion and her HB was noted to 6.0 again. She was given 2 units and her iron studies were consistent with DARRIN. B12 and folate were normal. Immunofixation showed weak IgG lambda migrating para protein. Free light chain ratio 2.1. Normal calcium. \par  EGD and VCE was done. EGD revealed gastritis and VCE showed bleeding in small bowel. \par  She was started on oral iron and she took for about three months. \par  Her latest ferritin done in feb 2020 was 24 and percent saturation was 72% and her hb improved to 8.8. She also has push enteroscopy in 02/2020 and no bleeding lesions were found. Was recommended to have repeat colonoscopy and double balloon enteroscopy if she bleeds again.\par  She went for blood work again on 04/01/2020 as she was feeling weak and having SOB and her hb dropped to 6.7. She was told by PMD to start on PROCRIT 10,000 units M-W-F. She was told her iron levels are good and she can stop iron. \par  \par  Today she feels very tired and her SOB is worse. She denies any melena or BRBPR in last few days. She does have anal fissure and hemorrhoids and bleeds when she is constipated. She denies hematuria or post menopausal bleeding. Denies weight loss. Does not take any NSAIDs or aspirin. She has not followed up with nephrology before.\par  Family history is significant for breast cancer in her mother. She is a former smoker stopped 20 years ago.\par  She is uptodate with mammogram and Pap smear. \par   [de-identified] : 09/17/2020 Patient returns for follow up , she feels better after venofer X 4 and on EPO 10,000 weekly , Hb is up to 10 .   10/15/2020 Patient returns for follow up 2 weeks after last dose of EPO , today's Hb is 10.7 . she offers no new complaints .   04/08/2021 Patient returns for follow up for anemia on EPO and iron replacement . Hb is 10.8 . she complains of mild left arm pain after she started to self check her BP . ahe meds were recently adjusted by her PMD .   08/31/2021 patient returns for follow up , she offers no new complaints , her Hgb is slowly trending down after last transfusion and venofer X1 for ferritin : 35 .   11/18/2021 patient returns for follow up complaining of weakness and dyspnea on minimal exertion , still with lower extremity edema R > L . Hgb is 7.1 2 weeks after transfusion and despite venofer and EPO . She denies melena or hematochezia . Of note she had suspected small bowel bleeding on enteroscopy or capsule endoscopy and was intolerant to push enteroscopy ( hypotension ? )  2/14/22- Patient is here for follow up visit for management of DARRIN. Hgb is 6.7  3 days only after last transfusion , she reports several episodes of bleeding presumably from hemorrhoids and is using topical medications , Ferritin levels remain low despite venofer . She complains of weakness, bilateral leg edema . no chest pain or SOB .   12/2/21: patient returns for follow up for DARRIN secondary  obscure GI bleeding  and CKD.  She is feeling better today. We will continue  Venofer infusion weekly X 3 and Procrit. Close monitor CBC on weekly basis with possible blood transfusion if required.   5/19/2022 Patient returns for follow up , she lost her  last week to lung cancer . He Hgb is still 7.2  two weeks after transfusion and despite weekly venofer . Ferritin is trending down . She is noted with weight loss and complains of abdominal cramps and pain radiating from epigastric area and RUQ , radiating to the back . Of note CT scan from 1/2022 showed prominent adenopathy involving mesenteric and retroperitoneal lymph nodes.    8/8/2022 Patient returns for chronic iron deficiency anemia , she is requiring transfusion every 2 weeks . today's Hgb is 6.9 however she denies hematochezia , increased weakness , dizziness , chest pain or palpitations . She continues with mild RUQ pain radiating to the back.    4/13/2023 patient returns for follow after recent admission for melena , she underwent push enteroscopy 	A few small bleeding diffuse angioectasias were seen in the antrum. The lesions were distributed in a watermelon-stomach pattern. Hemostasis was performed successfully at the antrum. 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 APC applied to bleeding sites with excellent hemostasis.   Multiple small AVMs were noted in duodenal bulb and second part of duodenum. , No blood was noted in jejunum. 2 small non-bleeding AVMs were noted in proximal jejunum.   She feels well today , no melena , no weakness or SOB . Edema improved on lasix 40 mg daily .     5/12/2023 Patient returns for follow up one week after transfusion of 2 units with recurrence of severe weakness, exertional dyspnea . Hgb is down to 5.5 ,She insists no change in stool color . ferritin is still < 100 despite weekly venofer . She is also on EPO for severe CKD , she was seen recently by renal and recommended blood and urine tests .   6/16/2023 Patient returns for follow up one week after last transfusion , Hgb is 7.8 and unusual for her , bleeding has probably subsided or diminished after the latest enteroscopy , multiple bleeding AVM's were treated with APC and endoclip.  She complains mainly of mild dry cough , mild peripheral edema ( R > L )   8/24/2023: Patient returns for follow up, her last blood transfusion was 2 weeks ago. She feels overall well, states that she has some drowsiness after having an EGD done yesterday. Colonoscopy/capsule endoscopy appointments still pending. She received her most recent dose of iron last week, ferritin 133.

## 2023-08-24 NOTE — ASSESSMENT
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 Worsening kidney function.  Did not tolerate Tranexamic acid. Receiving IV iron as needed, most recent ferritin level 133  Following with GI, endoscopy with endoclipping performed August 2023, repeat colonoscopy and capsule endoscopy still pending.  Hgb today 7.7, last blood transfusion 2 weeks ago  PLAN:  --Will hold off on blood transfusion this week, continue with weekly CBC monitoring  --Proceed with EPO today and weekly  --Will recheck ferritin today  RTC in 4 weeks   Patient was seen and examined and discussed with Dr. Gaston who agrees to the above plan of care.

## 2023-08-25 ENCOUNTER — APPOINTMENT (OUTPATIENT)
Dept: INFUSION THERAPY | Facility: CLINIC | Age: 78
End: 2023-08-25

## 2023-08-25 LAB — FERRITIN SERPL-MCNC: 121 NG/ML

## 2023-08-28 NOTE — ED ADULT TRIAGE NOTE - SOURCE OF INFORMATION
- See pleural effusion   - Pleur-X placed 8/04/23  - Patient states she drains it every other day (nursing staff aware; pt knows to request drainage when she feels she needs it as well). Last drained Tuesday with 10mL. Patient state she doesn't feel like she needs it drained today. Will hold and drain based on patient requests/stymptoms.   - Messaged Dr. Quintana at patient's request to clarify how often catheter should be drained. She recommended weekly based on CXR from 8/18/23 and twice weekly if symptomatic.   Patient/Son

## 2023-08-29 DIAGNOSIS — K31.819 ANGIODYSPLASIA OF STOMACH AND DUODENUM WITHOUT BLEEDING: ICD-10-CM

## 2023-08-29 DIAGNOSIS — D64.9 ANEMIA, UNSPECIFIED: ICD-10-CM

## 2023-08-29 DIAGNOSIS — K31.7 POLYP OF STOMACH AND DUODENUM: ICD-10-CM

## 2023-08-29 DIAGNOSIS — N18.9 CHRONIC KIDNEY DISEASE, UNSPECIFIED: ICD-10-CM

## 2023-08-29 DIAGNOSIS — I12.9 HYPERTENSIVE CHRONIC KIDNEY DISEASE WITH STAGE 1 THROUGH STAGE 4 CHRONIC KIDNEY DISEASE, OR UNSPECIFIED CHRONIC KIDNEY DISEASE: ICD-10-CM

## 2023-09-01 ENCOUNTER — OUTPATIENT (OUTPATIENT)
Dept: OUTPATIENT SERVICES | Facility: HOSPITAL | Age: 78
LOS: 1 days | End: 2023-09-01
Payer: MEDICARE

## 2023-09-01 ENCOUNTER — NON-APPOINTMENT (OUTPATIENT)
Age: 78
End: 2023-09-01

## 2023-09-01 ENCOUNTER — APPOINTMENT (OUTPATIENT)
Dept: INFUSION THERAPY | Facility: CLINIC | Age: 78
End: 2023-09-01

## 2023-09-01 ENCOUNTER — LABORATORY RESULT (OUTPATIENT)
Age: 78
End: 2023-09-01

## 2023-09-01 DIAGNOSIS — Z87.19 PERSONAL HISTORY OF OTHER DISEASES OF THE DIGESTIVE SYSTEM: Chronic | ICD-10-CM

## 2023-09-01 DIAGNOSIS — Z98.890 OTHER SPECIFIED POSTPROCEDURAL STATES: Chronic | ICD-10-CM

## 2023-09-01 DIAGNOSIS — Z90.49 ACQUIRED ABSENCE OF OTHER SPECIFIED PARTS OF DIGESTIVE TRACT: Chronic | ICD-10-CM

## 2023-09-01 DIAGNOSIS — Z98.891 HISTORY OF UTERINE SCAR FROM PREVIOUS SURGERY: Chronic | ICD-10-CM

## 2023-09-01 DIAGNOSIS — D64.9 ANEMIA, UNSPECIFIED: ICD-10-CM

## 2023-09-01 DIAGNOSIS — Z95.828 PRESENCE OF OTHER VASCULAR IMPLANTS AND GRAFTS: Chronic | ICD-10-CM

## 2023-09-01 PROCEDURE — 36430 TRANSFUSION BLD/BLD COMPNT: CPT

## 2023-09-01 PROCEDURE — P9040: CPT

## 2023-09-01 PROCEDURE — 86922 COMPATIBILITY TEST ANTIGLOB: CPT

## 2023-09-01 PROCEDURE — 96372 THER/PROPH/DIAG INJ SC/IM: CPT

## 2023-09-01 PROCEDURE — 36415 COLL VENOUS BLD VENIPUNCTURE: CPT

## 2023-09-01 PROCEDURE — 86902 BLOOD TYPE ANTIGEN DONOR EA: CPT

## 2023-09-01 PROCEDURE — 85027 COMPLETE CBC AUTOMATED: CPT

## 2023-09-01 RX ORDER — ERYTHROPOIETIN 10000 [IU]/ML
30000 INJECTION, SOLUTION INTRAVENOUS; SUBCUTANEOUS ONCE
Refills: 0 | Status: COMPLETED | OUTPATIENT
Start: 2023-09-01 | End: 2023-09-01

## 2023-09-01 RX ADMIN — ERYTHROPOIETIN 30000 UNIT(S): 10000 INJECTION, SOLUTION INTRAVENOUS; SUBCUTANEOUS at 14:00

## 2023-09-02 DIAGNOSIS — D64.9 ANEMIA, UNSPECIFIED: ICD-10-CM

## 2023-09-03 LAB
HCT VFR BLD CALC: 17.7 %
HGB BLD-MCNC: 5.4 G/DL
MCHC RBC-ENTMCNC: 28.7 PG
MCHC RBC-ENTMCNC: 30.5 G/DL
MCV RBC AUTO: 94.1 FL
PLATELET # BLD AUTO: 102 K/UL
PMV BLD: 10.7 FL
RBC # BLD: 1.88 M/UL
RBC # FLD: 16.5 %
WBC # FLD AUTO: 1.78 K/UL

## 2023-09-08 ENCOUNTER — OUTPATIENT (OUTPATIENT)
Dept: OUTPATIENT SERVICES | Facility: HOSPITAL | Age: 78
LOS: 1 days | End: 2023-09-08
Payer: MEDICARE

## 2023-09-08 ENCOUNTER — NON-APPOINTMENT (OUTPATIENT)
Age: 78
End: 2023-09-08

## 2023-09-08 ENCOUNTER — APPOINTMENT (OUTPATIENT)
Dept: INFUSION THERAPY | Facility: CLINIC | Age: 78
End: 2023-09-08

## 2023-09-08 DIAGNOSIS — D64.9 ANEMIA, UNSPECIFIED: ICD-10-CM

## 2023-09-08 DIAGNOSIS — Z98.891 HISTORY OF UTERINE SCAR FROM PREVIOUS SURGERY: Chronic | ICD-10-CM

## 2023-09-08 DIAGNOSIS — Z98.890 OTHER SPECIFIED POSTPROCEDURAL STATES: Chronic | ICD-10-CM

## 2023-09-08 DIAGNOSIS — Z90.49 ACQUIRED ABSENCE OF OTHER SPECIFIED PARTS OF DIGESTIVE TRACT: Chronic | ICD-10-CM

## 2023-09-08 DIAGNOSIS — Z87.19 PERSONAL HISTORY OF OTHER DISEASES OF THE DIGESTIVE SYSTEM: Chronic | ICD-10-CM

## 2023-09-08 DIAGNOSIS — Z95.828 PRESENCE OF OTHER VASCULAR IMPLANTS AND GRAFTS: Chronic | ICD-10-CM

## 2023-09-08 PROCEDURE — 36430 TRANSFUSION BLD/BLD COMPNT: CPT

## 2023-09-08 PROCEDURE — P9040: CPT

## 2023-09-08 PROCEDURE — 96372 THER/PROPH/DIAG INJ SC/IM: CPT

## 2023-09-08 PROCEDURE — 96365 THER/PROPH/DIAG IV INF INIT: CPT

## 2023-09-08 RX ORDER — ERYTHROPOIETIN 10000 [IU]/ML
30000 INJECTION, SOLUTION INTRAVENOUS; SUBCUTANEOUS ONCE
Refills: 0 | Status: COMPLETED | OUTPATIENT
Start: 2023-09-08 | End: 2023-09-08

## 2023-09-08 RX ORDER — IRON SUCROSE 20 MG/ML
200 INJECTION, SOLUTION INTRAVENOUS ONCE
Refills: 0 | Status: COMPLETED | OUTPATIENT
Start: 2023-09-08 | End: 2023-09-08

## 2023-09-08 RX ADMIN — IRON SUCROSE 220 MILLIGRAM(S): 20 INJECTION, SOLUTION INTRAVENOUS at 12:21

## 2023-09-08 RX ADMIN — ERYTHROPOIETIN 30000 UNIT(S): 10000 INJECTION, SOLUTION INTRAVENOUS; SUBCUTANEOUS at 10:45

## 2023-09-12 NOTE — ED PROVIDER NOTE - CONSTITUTIONAL NEGATIVE STATEMENT, MLM
72 year old with advanced dementia recently started on a new agent brought in for 3 day hx of agitated delirium with worsening cognition, disorganization and now recent history of mechanical falls     #Advanced dementia   -patient was aggressive to family -> calm and cooperative on my exam today  - cont home dose of ativan ( 0.5 am and afternoon, and 2mg at night)   - serequol was added on presentation,   - off rexulti ( was recently started but he had more agitation)- neurologist Dr Scherer [phone]  - Neuropsych eval:  Given patient’s Major Neurocognitive Disorder, he should be supervised and receive assistance in all activities of daily living/instrumental activities of daily living, and should not be left alone.  He will benefit from redirection, assistance from 24-hr aide noted to be present in the patient’s home, and reduced environmental distractions.  -PT recommend subacute rehab v home PT 2-3x/week    # recent UTI and covid (18 july)  - not hypoxic now, comfortable , recheck covid , and RVP   - UA positive -> F up cx.  - RVP: negative  - Patient was febrile 101.7 on 9/10-> tazocin Day2 to cover Enterococcus faecalis found in urine (7/16/23)- back then it was asymptomatic so was not treated  - ID team on board.     # hx of esophageal cancer status post chemo/radiation/surgery 2017, prostate cancer status post CyberKnife 2020, lung cancer status post surgical resection 2019  outpt fu    # Elevated Alk phos  - Bilirubin and GGT normal   - Us gallblader    # MARITZA  - Increase in creatinine from 0.8 to 1.1  - Hydration started for 10 hours 72 year old with advanced dementia recently started on a new agent brought in for 3 day hx of agitated delirium with worsening cognition, disorganization and now recent history of mechanical falls     #Advanced dementia   - cont home dose of ativan (0.5 am and afternoon, and 2mg at night)   - c/w serequol  - off rexulti (was recently started but he had more agitation)- neurologist Dr Scherer [phone]  - Neuropsych eval (9/11):  Given patient’s Major Neurocognitive Disorder, he should be supervised and receive assistance in all activities of daily living/instrumental activities of daily living, and should not be left alone.  He will benefit from redirection, assistance from 24-hr aide noted to be present in the patient’s home, and reduced environmental distractions.  -9/11: PT recommend subacute rehab v home PT 2-3x/week    # recent UTI and covid (18 july)  - not hypoxic now, comfortable  - UA positive -> F up cx  - RVP: negative  - Patient was febrile 101.7 on 9/10-> tazocin Day 3 to cover Enterococcus faecalis found in urine (7/16/23)- back then it was asymptomatic so was not treated  - ID cnsult (9/11):  - No other clear alternative source for fever  - continue zosyn 3.375 mg q 8 hours   - follow-up urine Cx   - trend fever curve     # hx of esophageal cancer status post chemo/radiation/surgery 2017, prostate cancer status post CyberKnife 2020, lung cancer status post surgical resection 2019  outpt fu    # Elevated Alk phos  - Bilirubin and GGT normal   -US gallblader (9/11): Cholelithiasis. No obvious cholecystitis.  -US Kidney and bladder (9/11): Non-obstructing right renal calculus. Bladder calculus.    # MARITZA  - Increase in creatinine from 0.8 to 1.1  -9/12: Cr improving (1.0) but still not at baseline, will continue with gentle hydration 72 year old with advanced dementia recently started on a new agent brought in for 3 day hx of agitated delirium with worsening cognition, disorganization and now recent history of mechanical falls     #Advanced dementia   - off rexulti (was recently started but he had more agitation)- neurologist Dr Scherer [phone]  - Neuropsych eval (9/11):  Given patient’s Major Neurocognitive Disorder, he should be supervised and receive assistance in all activities of daily living/instrumental activities of daily living, and should not be left alone.  He will benefit from redirection, assistance from 24-hr aide noted to be present in the patient’s home, and reduced environmental distractions.  -9/11: PT recommend subacute rehab v home PT 2-3x/week  - patient somnolent today -possible medication effect -will skip serequol today (9/12)  - usually receives ativan (0.5 am and afternoon, and 2mg at night) but withheld today (9/12) due to somnolence     # recent UTI and covid (18 july)  - not hypoxic now, comfortable  - UA positive -> F up cx  - RVP: negative  - Patient was febrile 101.7 on 9/10-> tazocin Day 3 to cover Enterococcus faecalis found in urine (7/16/23)- back then it was asymptomatic so was not treated  - ID cnsult (9/11):  - No other clear alternative source for fever  - continue zosyn 3.375 mg q 8 hours   - follow-up urine Cx   - trend fever curve     # hx of esophageal cancer status post chemo/radiation/surgery 2017, prostate cancer status post CyberKnife 2020, lung cancer status post surgical resection 2019  outpt fu    # Elevated Alk phos  - Bilirubin and GGT normal   -US gallblader (9/11): Cholelithiasis. No obvious cholecystitis.  -US Kidney and bladder (9/11): Non-obstructing right renal calculus. Bladder calculus.    # MARITZA  - Increase in creatinine from 0.8 to 1.1  -9/12: Cr improving (1.0) but still not at baseline, will continue with gentle hydration no fever and no chills.

## 2023-09-14 ENCOUNTER — APPOINTMENT (OUTPATIENT)
Dept: CARDIOLOGY | Facility: CLINIC | Age: 78
End: 2023-09-14
Payer: MEDICARE

## 2023-09-14 ENCOUNTER — RX RENEWAL (OUTPATIENT)
Age: 78
End: 2023-09-14

## 2023-09-14 ENCOUNTER — NON-APPOINTMENT (OUTPATIENT)
Age: 78
End: 2023-09-14

## 2023-09-14 DIAGNOSIS — I35.0 NONRHEUMATIC AORTIC (VALVE) STENOSIS: ICD-10-CM

## 2023-09-14 PROCEDURE — 99213 OFFICE O/P EST LOW 20 MIN: CPT

## 2023-09-14 RX ORDER — FOLIC ACID 1 MG/1
1 TABLET ORAL
Qty: 90 | Refills: 0 | Status: ACTIVE | COMMUNITY
Start: 2023-03-02 | End: 1900-01-01

## 2023-09-15 ENCOUNTER — APPOINTMENT (OUTPATIENT)
Dept: INFUSION THERAPY | Facility: CLINIC | Age: 78
End: 2023-09-15

## 2023-09-15 ENCOUNTER — OUTPATIENT (OUTPATIENT)
Dept: OUTPATIENT SERVICES | Facility: HOSPITAL | Age: 78
LOS: 1 days | End: 2023-09-15
Payer: MEDICARE

## 2023-09-15 VITALS
SYSTOLIC BLOOD PRESSURE: 120 MMHG | HEIGHT: 67 IN | BODY MASS INDEX: 34.84 KG/M2 | OXYGEN SATURATION: 99 % | RESPIRATION RATE: 14 BRPM | HEART RATE: 65 BPM | WEIGHT: 222 LBS | DIASTOLIC BLOOD PRESSURE: 80 MMHG

## 2023-09-15 DIAGNOSIS — Z95.828 PRESENCE OF OTHER VASCULAR IMPLANTS AND GRAFTS: Chronic | ICD-10-CM

## 2023-09-15 DIAGNOSIS — Z87.19 PERSONAL HISTORY OF OTHER DISEASES OF THE DIGESTIVE SYSTEM: Chronic | ICD-10-CM

## 2023-09-15 DIAGNOSIS — Z90.49 ACQUIRED ABSENCE OF OTHER SPECIFIED PARTS OF DIGESTIVE TRACT: Chronic | ICD-10-CM

## 2023-09-15 DIAGNOSIS — Z98.891 HISTORY OF UTERINE SCAR FROM PREVIOUS SURGERY: Chronic | ICD-10-CM

## 2023-09-15 DIAGNOSIS — Z98.890 OTHER SPECIFIED POSTPROCEDURAL STATES: Chronic | ICD-10-CM

## 2023-09-15 DIAGNOSIS — D64.9 ANEMIA, UNSPECIFIED: ICD-10-CM

## 2023-09-15 PROCEDURE — P9040: CPT

## 2023-09-15 PROCEDURE — 96372 THER/PROPH/DIAG INJ SC/IM: CPT

## 2023-09-15 PROCEDURE — 36430 TRANSFUSION BLD/BLD COMPNT: CPT

## 2023-09-15 PROCEDURE — 96365 THER/PROPH/DIAG IV INF INIT: CPT

## 2023-09-15 RX ORDER — IRON SUCROSE 20 MG/ML
200 INJECTION, SOLUTION INTRAVENOUS ONCE
Refills: 0 | Status: COMPLETED | OUTPATIENT
Start: 2023-09-15 | End: 2023-09-15

## 2023-09-15 RX ORDER — ERYTHROPOIETIN 10000 [IU]/ML
30000 INJECTION, SOLUTION INTRAVENOUS; SUBCUTANEOUS ONCE
Refills: 0 | Status: COMPLETED | OUTPATIENT
Start: 2023-09-15 | End: 2023-09-15

## 2023-09-15 RX ORDER — FUROSEMIDE 40 MG
20 TABLET ORAL ONCE
Refills: 0 | Status: COMPLETED | OUTPATIENT
Start: 2023-09-15 | End: 2023-09-15

## 2023-09-15 RX ADMIN — ERYTHROPOIETIN 30000 UNIT(S): 10000 INJECTION, SOLUTION INTRAVENOUS; SUBCUTANEOUS at 13:23

## 2023-09-15 RX ADMIN — IRON SUCROSE 200 MILLIGRAM(S): 20 INJECTION, SOLUTION INTRAVENOUS at 15:20

## 2023-09-15 RX ADMIN — IRON SUCROSE 220 MILLIGRAM(S): 20 INJECTION, SOLUTION INTRAVENOUS at 14:44

## 2023-09-17 PROBLEM — I35.0 AORTIC STENOSIS: Status: ACTIVE | Noted: 2017-07-18

## 2023-09-19 NOTE — CONSULT NOTE ADULT - REASON FOR ADMISSION
No care due was identified.  [facility] Embedded Care Due Messages. Reference number: [id number].   9/19/2023 3:37:13 PM CDT   anemia on outpatient labs

## 2023-09-22 ENCOUNTER — APPOINTMENT (OUTPATIENT)
Dept: INFUSION THERAPY | Facility: CLINIC | Age: 78
End: 2023-09-22

## 2023-09-22 ENCOUNTER — OUTPATIENT (OUTPATIENT)
Dept: OUTPATIENT SERVICES | Facility: HOSPITAL | Age: 78
LOS: 1 days | End: 2023-09-22
Payer: MEDICARE

## 2023-09-22 VITALS
DIASTOLIC BLOOD PRESSURE: 60 MMHG | TEMPERATURE: 98.1 F | HEART RATE: 71 BPM | SYSTOLIC BLOOD PRESSURE: 132 MMHG | RESPIRATION RATE: 18 BRPM

## 2023-09-22 VITALS
HEART RATE: 77 BPM | TEMPERATURE: 98.1 F | DIASTOLIC BLOOD PRESSURE: 54 MMHG | RESPIRATION RATE: 14 BRPM | SYSTOLIC BLOOD PRESSURE: 126 MMHG

## 2023-09-22 VITALS
HEART RATE: 69 BPM | RESPIRATION RATE: 14 BRPM | SYSTOLIC BLOOD PRESSURE: 135 MMHG | TEMPERATURE: 98.6 F | DIASTOLIC BLOOD PRESSURE: 58 MMHG

## 2023-09-22 DIAGNOSIS — Z98.890 OTHER SPECIFIED POSTPROCEDURAL STATES: Chronic | ICD-10-CM

## 2023-09-22 DIAGNOSIS — Z90.49 ACQUIRED ABSENCE OF OTHER SPECIFIED PARTS OF DIGESTIVE TRACT: Chronic | ICD-10-CM

## 2023-09-22 DIAGNOSIS — Z87.19 PERSONAL HISTORY OF OTHER DISEASES OF THE DIGESTIVE SYSTEM: Chronic | ICD-10-CM

## 2023-09-22 DIAGNOSIS — D64.9 ANEMIA, UNSPECIFIED: ICD-10-CM

## 2023-09-22 DIAGNOSIS — Z95.828 PRESENCE OF OTHER VASCULAR IMPLANTS AND GRAFTS: Chronic | ICD-10-CM

## 2023-09-22 DIAGNOSIS — Z98.891 HISTORY OF UTERINE SCAR FROM PREVIOUS SURGERY: Chronic | ICD-10-CM

## 2023-09-22 PROCEDURE — 96372 THER/PROPH/DIAG INJ SC/IM: CPT

## 2023-09-22 PROCEDURE — P9040: CPT

## 2023-09-22 PROCEDURE — 96365 THER/PROPH/DIAG IV INF INIT: CPT

## 2023-09-22 PROCEDURE — 36430 TRANSFUSION BLD/BLD COMPNT: CPT

## 2023-09-22 RX ORDER — IRON SUCROSE 20 MG/ML
200 INJECTION, SOLUTION INTRAVENOUS ONCE
Refills: 0 | Status: COMPLETED | OUTPATIENT
Start: 2023-09-22 | End: 2023-09-22

## 2023-09-22 RX ORDER — ERYTHROPOIETIN 10000 [IU]/ML
30000 INJECTION, SOLUTION INTRAVENOUS; SUBCUTANEOUS ONCE
Refills: 0 | Status: COMPLETED | OUTPATIENT
Start: 2023-09-22 | End: 2023-09-22

## 2023-09-22 RX ADMIN — IRON SUCROSE 200 MILLIGRAM(S): 20 INJECTION, SOLUTION INTRAVENOUS at 11:55

## 2023-09-22 RX ADMIN — ERYTHROPOIETIN 30000 UNIT(S): 10000 INJECTION, SOLUTION INTRAVENOUS; SUBCUTANEOUS at 12:21

## 2023-09-22 RX ADMIN — IRON SUCROSE 220 MILLIGRAM(S): 20 INJECTION, SOLUTION INTRAVENOUS at 12:21

## 2023-09-25 RX ORDER — SODIUM PICOSULFATE, MAGNESIUM OXIDE, AND ANHYDROUS CITRIC ACID 12; 3.5; 1 G/175ML; G/175ML; MG/175ML
10-3.5-12 MG-GM LIQUID ORAL
Qty: 2 | Refills: 0 | Status: ACTIVE | COMMUNITY
Start: 2023-09-25 | End: 1900-01-01

## 2023-09-26 DIAGNOSIS — E87.5 HYPERKALEMIA: ICD-10-CM

## 2023-09-27 ENCOUNTER — APPOINTMENT (OUTPATIENT)
Dept: INFUSION THERAPY | Facility: CLINIC | Age: 78
End: 2023-09-27

## 2023-09-28 ENCOUNTER — OUTPATIENT (OUTPATIENT)
Dept: OUTPATIENT SERVICES | Facility: HOSPITAL | Age: 78
LOS: 1 days | End: 2023-09-28
Payer: MEDICARE

## 2023-09-28 ENCOUNTER — APPOINTMENT (OUTPATIENT)
Dept: INFUSION THERAPY | Facility: CLINIC | Age: 78
End: 2023-09-28

## 2023-09-28 DIAGNOSIS — Z98.890 OTHER SPECIFIED POSTPROCEDURAL STATES: Chronic | ICD-10-CM

## 2023-09-28 DIAGNOSIS — D64.9 ANEMIA, UNSPECIFIED: ICD-10-CM

## 2023-09-28 DIAGNOSIS — Z87.19 PERSONAL HISTORY OF OTHER DISEASES OF THE DIGESTIVE SYSTEM: Chronic | ICD-10-CM

## 2023-09-28 DIAGNOSIS — Z90.49 ACQUIRED ABSENCE OF OTHER SPECIFIED PARTS OF DIGESTIVE TRACT: Chronic | ICD-10-CM

## 2023-09-28 DIAGNOSIS — Z98.891 HISTORY OF UTERINE SCAR FROM PREVIOUS SURGERY: Chronic | ICD-10-CM

## 2023-09-28 DIAGNOSIS — Z95.828 PRESENCE OF OTHER VASCULAR IMPLANTS AND GRAFTS: Chronic | ICD-10-CM

## 2023-09-28 LAB
ANION GAP SERPL CALC-SCNC: 8 MMOL/L
BUN SERPL-MCNC: 45 MG/DL
CALCIUM SERPL-MCNC: 8 MG/DL
CHLORIDE SERPL-SCNC: 117 MMOL/L
CO2 SERPL-SCNC: 19 MMOL/L
CREAT SERPL-MCNC: 2.4 MG/DL
EGFR: 20 ML/MIN/1.73M2
GLUCOSE SERPL-MCNC: 82 MG/DL
POTASSIUM SERPL-SCNC: 5.8 MMOL/L
SODIUM SERPL-SCNC: 144 MMOL/L

## 2023-09-28 PROCEDURE — 36415 COLL VENOUS BLD VENIPUNCTURE: CPT

## 2023-09-28 PROCEDURE — 96372 THER/PROPH/DIAG INJ SC/IM: CPT

## 2023-09-28 PROCEDURE — 80048 BASIC METABOLIC PNL TOTAL CA: CPT

## 2023-09-28 PROCEDURE — 96365 THER/PROPH/DIAG IV INF INIT: CPT

## 2023-09-28 RX ORDER — ERYTHROPOIETIN 10000 [IU]/ML
30000 INJECTION, SOLUTION INTRAVENOUS; SUBCUTANEOUS ONCE
Refills: 0 | Status: COMPLETED | OUTPATIENT
Start: 2023-09-28 | End: 2023-09-28

## 2023-09-28 RX ORDER — IRON SUCROSE 20 MG/ML
200 INJECTION, SOLUTION INTRAVENOUS ONCE
Refills: 0 | Status: COMPLETED | OUTPATIENT
Start: 2023-09-28 | End: 2023-09-28

## 2023-09-28 RX ADMIN — IRON SUCROSE 200 MILLIGRAM(S): 20 INJECTION, SOLUTION INTRAVENOUS at 13:45

## 2023-09-28 RX ADMIN — ERYTHROPOIETIN 30000 UNIT(S): 10000 INJECTION, SOLUTION INTRAVENOUS; SUBCUTANEOUS at 13:07

## 2023-09-28 RX ADMIN — IRON SUCROSE 220 MILLIGRAM(S): 20 INJECTION, SOLUTION INTRAVENOUS at 13:07

## 2023-10-05 ENCOUNTER — RX RENEWAL (OUTPATIENT)
Age: 78
End: 2023-10-05

## 2023-10-06 ENCOUNTER — APPOINTMENT (OUTPATIENT)
Dept: INFUSION THERAPY | Facility: CLINIC | Age: 78
End: 2023-10-06

## 2023-10-06 ENCOUNTER — OUTPATIENT (OUTPATIENT)
Dept: OUTPATIENT SERVICES | Facility: HOSPITAL | Age: 78
LOS: 1 days | End: 2023-10-06
Payer: MEDICARE

## 2023-10-06 DIAGNOSIS — Z98.890 OTHER SPECIFIED POSTPROCEDURAL STATES: Chronic | ICD-10-CM

## 2023-10-06 DIAGNOSIS — Z90.49 ACQUIRED ABSENCE OF OTHER SPECIFIED PARTS OF DIGESTIVE TRACT: Chronic | ICD-10-CM

## 2023-10-06 DIAGNOSIS — Z98.891 HISTORY OF UTERINE SCAR FROM PREVIOUS SURGERY: Chronic | ICD-10-CM

## 2023-10-06 DIAGNOSIS — Z95.828 PRESENCE OF OTHER VASCULAR IMPLANTS AND GRAFTS: Chronic | ICD-10-CM

## 2023-10-06 DIAGNOSIS — D64.9 ANEMIA, UNSPECIFIED: ICD-10-CM

## 2023-10-06 DIAGNOSIS — Z87.19 PERSONAL HISTORY OF OTHER DISEASES OF THE DIGESTIVE SYSTEM: Chronic | ICD-10-CM

## 2023-10-06 LAB
ANION GAP SERPL CALC-SCNC: 7 MMOL/L
BUN SERPL-MCNC: 44 MG/DL
CALCIUM SERPL-MCNC: 8 MG/DL
CHLORIDE SERPL-SCNC: 114 MMOL/L
CO2 SERPL-SCNC: 23 MMOL/L
CREAT SERPL-MCNC: 2.4 MG/DL
EGFR: 20 ML/MIN/1.73M2
GLUCOSE SERPL-MCNC: 94 MG/DL
POTASSIUM SERPL-SCNC: 5.3 MMOL/L
SODIUM SERPL-SCNC: 144 MMOL/L

## 2023-10-06 PROCEDURE — 96365 THER/PROPH/DIAG IV INF INIT: CPT

## 2023-10-06 PROCEDURE — 86902 BLOOD TYPE ANTIGEN DONOR EA: CPT

## 2023-10-06 PROCEDURE — 36415 COLL VENOUS BLD VENIPUNCTURE: CPT

## 2023-10-06 PROCEDURE — 36430 TRANSFUSION BLD/BLD COMPNT: CPT

## 2023-10-06 PROCEDURE — 80048 BASIC METABOLIC PNL TOTAL CA: CPT

## 2023-10-06 PROCEDURE — 86922 COMPATIBILITY TEST ANTIGLOB: CPT

## 2023-10-06 PROCEDURE — 96372 THER/PROPH/DIAG INJ SC/IM: CPT

## 2023-10-06 PROCEDURE — P9040: CPT

## 2023-10-06 RX ORDER — DIPHENHYDRAMINE HCL 50 MG
25 CAPSULE ORAL ONCE
Refills: 0 | Status: COMPLETED | OUTPATIENT
Start: 2023-10-06 | End: 2023-10-06

## 2023-10-06 RX ORDER — ERYTHROPOIETIN 10000 [IU]/ML
30000 INJECTION, SOLUTION INTRAVENOUS; SUBCUTANEOUS ONCE
Refills: 0 | Status: COMPLETED | OUTPATIENT
Start: 2023-10-06 | End: 2023-10-06

## 2023-10-06 RX ORDER — IRON SUCROSE 20 MG/ML
200 INJECTION, SOLUTION INTRAVENOUS ONCE
Refills: 0 | Status: COMPLETED | OUTPATIENT
Start: 2023-10-06 | End: 2023-10-06

## 2023-10-06 RX ADMIN — IRON SUCROSE 220 MILLIGRAM(S): 20 INJECTION, SOLUTION INTRAVENOUS at 12:50

## 2023-10-06 RX ADMIN — IRON SUCROSE 200 MILLIGRAM(S): 20 INJECTION, SOLUTION INTRAVENOUS at 13:26

## 2023-10-06 RX ADMIN — Medication 25 MILLIGRAM(S): at 13:21

## 2023-10-06 RX ADMIN — ERYTHROPOIETIN 30000 UNIT(S): 10000 INJECTION, SOLUTION INTRAVENOUS; SUBCUTANEOUS at 12:50

## 2023-10-07 DIAGNOSIS — D64.9 ANEMIA, UNSPECIFIED: ICD-10-CM

## 2023-10-12 ENCOUNTER — OUTPATIENT (OUTPATIENT)
Dept: OUTPATIENT SERVICES | Facility: HOSPITAL | Age: 78
LOS: 1 days | End: 2023-10-12
Payer: MEDICARE

## 2023-10-12 ENCOUNTER — APPOINTMENT (OUTPATIENT)
Dept: INFUSION THERAPY | Facility: CLINIC | Age: 78
End: 2023-10-12

## 2023-10-12 DIAGNOSIS — Z90.49 ACQUIRED ABSENCE OF OTHER SPECIFIED PARTS OF DIGESTIVE TRACT: Chronic | ICD-10-CM

## 2023-10-12 DIAGNOSIS — Z98.890 OTHER SPECIFIED POSTPROCEDURAL STATES: Chronic | ICD-10-CM

## 2023-10-12 DIAGNOSIS — Z87.19 PERSONAL HISTORY OF OTHER DISEASES OF THE DIGESTIVE SYSTEM: Chronic | ICD-10-CM

## 2023-10-12 DIAGNOSIS — Z98.891 HISTORY OF UTERINE SCAR FROM PREVIOUS SURGERY: Chronic | ICD-10-CM

## 2023-10-12 DIAGNOSIS — D64.9 ANEMIA, UNSPECIFIED: ICD-10-CM

## 2023-10-12 DIAGNOSIS — Z95.828 PRESENCE OF OTHER VASCULAR IMPLANTS AND GRAFTS: Chronic | ICD-10-CM

## 2023-10-12 PROCEDURE — P9040: CPT

## 2023-10-12 PROCEDURE — 96372 THER/PROPH/DIAG INJ SC/IM: CPT

## 2023-10-12 PROCEDURE — 96365 THER/PROPH/DIAG IV INF INIT: CPT

## 2023-10-12 PROCEDURE — 36430 TRANSFUSION BLD/BLD COMPNT: CPT

## 2023-10-12 RX ORDER — IRON SUCROSE 20 MG/ML
200 INJECTION, SOLUTION INTRAVENOUS ONCE
Refills: 0 | Status: COMPLETED | OUTPATIENT
Start: 2023-10-12 | End: 2023-10-12

## 2023-10-12 RX ORDER — ERYTHROPOIETIN 10000 [IU]/ML
30000 INJECTION, SOLUTION INTRAVENOUS; SUBCUTANEOUS ONCE
Refills: 0 | Status: COMPLETED | OUTPATIENT
Start: 2023-10-12 | End: 2023-10-12

## 2023-10-12 RX ADMIN — IRON SUCROSE 200 MILLIGRAM(S): 20 INJECTION, SOLUTION INTRAVENOUS at 13:45

## 2023-10-12 RX ADMIN — ERYTHROPOIETIN 30000 UNIT(S): 10000 INJECTION, SOLUTION INTRAVENOUS; SUBCUTANEOUS at 13:20

## 2023-10-12 RX ADMIN — IRON SUCROSE 220 MILLIGRAM(S): 20 INJECTION, SOLUTION INTRAVENOUS at 13:14

## 2023-10-12 NOTE — ED PROCEDURE NOTE - ATTENDING CONTRIBUTION TO CARE
Detail Level: Detailed Depth Of Biopsy: dermis Was A Bandage Applied: Yes I personally supervised the study.  I reviewed the images and interpretation by the resident/ACP and have edited where appropriate. Size Of Lesion In Cm: 0.5 X Size Of Lesion In Cm: 0 Biopsy Type: H and E Biopsy Method: double edge Personna blade Anesthesia Type: 1% lidocaine without epinephrine Hemostasis: Aluminum Chloride Wound Care: Aquaphor Dressing: bandage Destruction After The Procedure: No Type Of Destruction Used: Curettage Curettage Text: The wound bed was treated with curettage after the biopsy was performed. Cryotherapy Text: The wound bed was treated with cryotherapy after the biopsy was performed. Electrodesiccation Text: The wound bed was treated with electrodesiccation after the biopsy was performed. Electrodesiccation And Curettage Text: The wound bed was treated with electrodesiccation and curettage after the biopsy was performed. Silver Nitrate Text: The wound bed was treated with silver nitrate after the biopsy was performed. Lab: -[id number] Consent: Written consent was obtained and risks were reviewed including but not limited to scarring, infection, bleeding, scabbing, incomplete removal, nerve damage and allergy to anesthesia. Post-Care Instructions: I reviewed with the patient in detail post-care instructions. Patient is to keep the biopsy site dry overnight, and then apply bacitracin twice daily until healed. Patient may apply hydrogen peroxide soaks to remove any crusting. Notification Instructions: Patient will be notified of biopsy results. However, patient instructed to call the office if not contacted within 2 weeks. Billing Type: [employer] Information: Selecting Yes will display possible errors in your note based on the variables you have selected. This validation is only offered as a suggestion for you. PLEASE NOTE THAT THE VALIDATION TEXT WILL BE REMOVED WHEN YOU FINALIZE YOUR NOTE. IF YOU WANT TO FAX A PRELIMINARY NOTE YOU WILL NEED TO TOGGLE THIS TO 'NO' IF YOU DO NOT WANT IT IN YOUR FAXED NOTE.

## 2023-10-16 ENCOUNTER — OUTPATIENT (OUTPATIENT)
Dept: OUTPATIENT SERVICES | Facility: HOSPITAL | Age: 78
LOS: 1 days | End: 2023-10-16
Payer: MEDICARE

## 2023-10-16 ENCOUNTER — APPOINTMENT (OUTPATIENT)
Dept: HEMATOLOGY ONCOLOGY | Facility: CLINIC | Age: 78
End: 2023-10-16

## 2023-10-16 DIAGNOSIS — Z95.828 PRESENCE OF OTHER VASCULAR IMPLANTS AND GRAFTS: Chronic | ICD-10-CM

## 2023-10-16 DIAGNOSIS — Z98.890 OTHER SPECIFIED POSTPROCEDURAL STATES: Chronic | ICD-10-CM

## 2023-10-16 DIAGNOSIS — Z87.19 PERSONAL HISTORY OF OTHER DISEASES OF THE DIGESTIVE SYSTEM: Chronic | ICD-10-CM

## 2023-10-16 DIAGNOSIS — Z90.49 ACQUIRED ABSENCE OF OTHER SPECIFIED PARTS OF DIGESTIVE TRACT: Chronic | ICD-10-CM

## 2023-10-16 DIAGNOSIS — D69.6 THROMBOCYTOPENIA, UNSPECIFIED: ICD-10-CM

## 2023-10-16 DIAGNOSIS — D64.9 ANEMIA, UNSPECIFIED: ICD-10-CM

## 2023-10-16 LAB — POTASSIUM SERPL-SCNC: 4.6 MMOL/L

## 2023-10-16 PROCEDURE — 84132 ASSAY OF SERUM POTASSIUM: CPT

## 2023-10-16 PROCEDURE — 36415 COLL VENOUS BLD VENIPUNCTURE: CPT

## 2023-10-17 DIAGNOSIS — D69.6 THROMBOCYTOPENIA, UNSPECIFIED: ICD-10-CM

## 2023-10-19 ENCOUNTER — NON-APPOINTMENT (OUTPATIENT)
Age: 78
End: 2023-10-19

## 2023-10-20 ENCOUNTER — OUTPATIENT (OUTPATIENT)
Dept: OUTPATIENT SERVICES | Facility: HOSPITAL | Age: 78
LOS: 1 days | End: 2023-10-20
Payer: MEDICARE

## 2023-10-20 ENCOUNTER — APPOINTMENT (OUTPATIENT)
Dept: INFUSION THERAPY | Facility: CLINIC | Age: 78
End: 2023-10-20

## 2023-10-20 DIAGNOSIS — Z98.890 OTHER SPECIFIED POSTPROCEDURAL STATES: Chronic | ICD-10-CM

## 2023-10-20 DIAGNOSIS — D64.9 ANEMIA, UNSPECIFIED: ICD-10-CM

## 2023-10-20 DIAGNOSIS — Z98.891 HISTORY OF UTERINE SCAR FROM PREVIOUS SURGERY: Chronic | ICD-10-CM

## 2023-10-20 DIAGNOSIS — Z90.49 ACQUIRED ABSENCE OF OTHER SPECIFIED PARTS OF DIGESTIVE TRACT: Chronic | ICD-10-CM

## 2023-10-20 DIAGNOSIS — Z95.828 PRESENCE OF OTHER VASCULAR IMPLANTS AND GRAFTS: Chronic | ICD-10-CM

## 2023-10-20 DIAGNOSIS — Z87.19 PERSONAL HISTORY OF OTHER DISEASES OF THE DIGESTIVE SYSTEM: Chronic | ICD-10-CM

## 2023-10-20 PROCEDURE — 96372 THER/PROPH/DIAG INJ SC/IM: CPT

## 2023-10-20 PROCEDURE — 86902 BLOOD TYPE ANTIGEN DONOR EA: CPT

## 2023-10-20 PROCEDURE — 86922 COMPATIBILITY TEST ANTIGLOB: CPT

## 2023-10-20 PROCEDURE — 96365 THER/PROPH/DIAG IV INF INIT: CPT

## 2023-10-20 PROCEDURE — P9040: CPT

## 2023-10-20 PROCEDURE — 36415 COLL VENOUS BLD VENIPUNCTURE: CPT

## 2023-10-20 PROCEDURE — 36430 TRANSFUSION BLD/BLD COMPNT: CPT

## 2023-10-20 RX ORDER — FUROSEMIDE 40 MG
20 TABLET ORAL ONCE
Refills: 0 | Status: COMPLETED | OUTPATIENT
Start: 2023-10-20 | End: 2023-10-20

## 2023-10-20 RX ORDER — ERYTHROPOIETIN 10000 [IU]/ML
30000 INJECTION, SOLUTION INTRAVENOUS; SUBCUTANEOUS ONCE
Refills: 0 | Status: COMPLETED | OUTPATIENT
Start: 2023-10-20 | End: 2023-10-20

## 2023-10-20 RX ORDER — IRON SUCROSE 20 MG/ML
200 INJECTION, SOLUTION INTRAVENOUS ONCE
Refills: 0 | Status: COMPLETED | OUTPATIENT
Start: 2023-10-20 | End: 2023-10-20

## 2023-10-20 RX ADMIN — ERYTHROPOIETIN 30000 UNIT(S): 10000 INJECTION, SOLUTION INTRAVENOUS; SUBCUTANEOUS at 12:29

## 2023-10-20 RX ADMIN — IRON SUCROSE 220 MILLIGRAM(S): 20 INJECTION, SOLUTION INTRAVENOUS at 12:29

## 2023-10-27 ENCOUNTER — APPOINTMENT (OUTPATIENT)
Dept: INFUSION THERAPY | Facility: CLINIC | Age: 78
End: 2023-10-27

## 2023-11-03 ENCOUNTER — APPOINTMENT (OUTPATIENT)
Dept: INFUSION THERAPY | Facility: CLINIC | Age: 78
End: 2023-11-03

## 2023-11-03 ENCOUNTER — OUTPATIENT (OUTPATIENT)
Dept: OUTPATIENT SERVICES | Facility: HOSPITAL | Age: 78
LOS: 1 days | End: 2023-11-03
Payer: MEDICARE

## 2023-11-03 VITALS
SYSTOLIC BLOOD PRESSURE: 113 MMHG | HEART RATE: 63 BPM | DIASTOLIC BLOOD PRESSURE: 51 MMHG | RESPIRATION RATE: 14 BRPM | TEMPERATURE: 98.2 F

## 2023-11-03 DIAGNOSIS — Z90.49 ACQUIRED ABSENCE OF OTHER SPECIFIED PARTS OF DIGESTIVE TRACT: Chronic | ICD-10-CM

## 2023-11-03 DIAGNOSIS — Z98.890 OTHER SPECIFIED POSTPROCEDURAL STATES: Chronic | ICD-10-CM

## 2023-11-03 DIAGNOSIS — Z87.19 PERSONAL HISTORY OF OTHER DISEASES OF THE DIGESTIVE SYSTEM: Chronic | ICD-10-CM

## 2023-11-03 DIAGNOSIS — Z98.891 HISTORY OF UTERINE SCAR FROM PREVIOUS SURGERY: Chronic | ICD-10-CM

## 2023-11-03 DIAGNOSIS — D64.9 ANEMIA, UNSPECIFIED: ICD-10-CM

## 2023-11-03 DIAGNOSIS — Z95.828 PRESENCE OF OTHER VASCULAR IMPLANTS AND GRAFTS: Chronic | ICD-10-CM

## 2023-11-03 PROCEDURE — 80053 COMPREHEN METABOLIC PANEL: CPT

## 2023-11-03 PROCEDURE — 96365 THER/PROPH/DIAG IV INF INIT: CPT

## 2023-11-03 PROCEDURE — 36430 TRANSFUSION BLD/BLD COMPNT: CPT

## 2023-11-03 PROCEDURE — P9040: CPT

## 2023-11-03 PROCEDURE — 96372 THER/PROPH/DIAG INJ SC/IM: CPT

## 2023-11-03 RX ORDER — IRON SUCROSE 20 MG/ML
200 INJECTION, SOLUTION INTRAVENOUS ONCE
Refills: 0 | Status: COMPLETED | OUTPATIENT
Start: 2023-11-03 | End: 2023-11-03

## 2023-11-03 RX ORDER — ERYTHROPOIETIN 10000 [IU]/ML
30000 INJECTION, SOLUTION INTRAVENOUS; SUBCUTANEOUS ONCE
Refills: 0 | Status: COMPLETED | OUTPATIENT
Start: 2023-11-03 | End: 2023-11-03

## 2023-11-03 RX ADMIN — IRON SUCROSE 200 MILLIGRAM(S): 20 INJECTION, SOLUTION INTRAVENOUS at 13:03

## 2023-11-03 RX ADMIN — IRON SUCROSE 220 MILLIGRAM(S): 20 INJECTION, SOLUTION INTRAVENOUS at 12:35

## 2023-11-03 RX ADMIN — ERYTHROPOIETIN 30000 UNIT(S): 10000 INJECTION, SOLUTION INTRAVENOUS; SUBCUTANEOUS at 12:35

## 2023-11-04 DIAGNOSIS — D64.9 ANEMIA, UNSPECIFIED: ICD-10-CM

## 2023-11-10 ENCOUNTER — APPOINTMENT (OUTPATIENT)
Dept: INFUSION THERAPY | Facility: CLINIC | Age: 78
End: 2023-11-10

## 2023-11-10 ENCOUNTER — OUTPATIENT (OUTPATIENT)
Dept: OUTPATIENT SERVICES | Facility: HOSPITAL | Age: 78
LOS: 1 days | End: 2023-11-10
Payer: MEDICARE

## 2023-11-10 VITALS
HEART RATE: 71 BPM | DIASTOLIC BLOOD PRESSURE: 58 MMHG | RESPIRATION RATE: 16 BRPM | TEMPERATURE: 98.1 F | SYSTOLIC BLOOD PRESSURE: 124 MMHG

## 2023-11-10 VITALS
SYSTOLIC BLOOD PRESSURE: 130 MMHG | HEART RATE: 69 BPM | TEMPERATURE: 98.2 F | RESPIRATION RATE: 16 BRPM | DIASTOLIC BLOOD PRESSURE: 52 MMHG

## 2023-11-10 VITALS — SYSTOLIC BLOOD PRESSURE: 122 MMHG | TEMPERATURE: 97.7 F | DIASTOLIC BLOOD PRESSURE: 53 MMHG | HEART RATE: 70 BPM

## 2023-11-10 DIAGNOSIS — Z90.49 ACQUIRED ABSENCE OF OTHER SPECIFIED PARTS OF DIGESTIVE TRACT: Chronic | ICD-10-CM

## 2023-11-10 DIAGNOSIS — Z98.891 HISTORY OF UTERINE SCAR FROM PREVIOUS SURGERY: Chronic | ICD-10-CM

## 2023-11-10 DIAGNOSIS — D64.9 ANEMIA, UNSPECIFIED: ICD-10-CM

## 2023-11-10 DIAGNOSIS — Z95.828 PRESENCE OF OTHER VASCULAR IMPLANTS AND GRAFTS: Chronic | ICD-10-CM

## 2023-11-10 DIAGNOSIS — Z98.890 OTHER SPECIFIED POSTPROCEDURAL STATES: Chronic | ICD-10-CM

## 2023-11-10 DIAGNOSIS — Z87.19 PERSONAL HISTORY OF OTHER DISEASES OF THE DIGESTIVE SYSTEM: Chronic | ICD-10-CM

## 2023-11-10 PROCEDURE — 96372 THER/PROPH/DIAG INJ SC/IM: CPT

## 2023-11-10 PROCEDURE — 96365 THER/PROPH/DIAG IV INF INIT: CPT

## 2023-11-10 PROCEDURE — 36430 TRANSFUSION BLD/BLD COMPNT: CPT

## 2023-11-10 PROCEDURE — P9040: CPT

## 2023-11-10 RX ORDER — IRON SUCROSE 20 MG/ML
200 INJECTION, SOLUTION INTRAVENOUS ONCE
Refills: 0 | Status: COMPLETED | OUTPATIENT
Start: 2023-11-10 | End: 2023-11-10

## 2023-11-10 RX ORDER — ERYTHROPOIETIN 10000 [IU]/ML
30000 INJECTION, SOLUTION INTRAVENOUS; SUBCUTANEOUS ONCE
Refills: 0 | Status: COMPLETED | OUTPATIENT
Start: 2023-11-10 | End: 2023-11-10

## 2023-11-10 RX ADMIN — ERYTHROPOIETIN 30000 UNIT(S): 10000 INJECTION, SOLUTION INTRAVENOUS; SUBCUTANEOUS at 12:38

## 2023-11-10 RX ADMIN — IRON SUCROSE 220 MILLIGRAM(S): 20 INJECTION, SOLUTION INTRAVENOUS at 12:39

## 2023-11-13 ENCOUNTER — APPOINTMENT (OUTPATIENT)
Dept: GASTROENTEROLOGY | Facility: CLINIC | Age: 78
End: 2023-11-13

## 2023-11-14 ENCOUNTER — RX RENEWAL (OUTPATIENT)
Age: 78
End: 2023-11-14

## 2023-11-14 RX ORDER — PANTOPRAZOLE 40 MG/1
40 TABLET, DELAYED RELEASE ORAL TWICE DAILY
Qty: 180 | Refills: 0 | Status: ACTIVE | COMMUNITY
Start: 2023-11-14 | End: 1900-01-01

## 2023-11-16 ENCOUNTER — LABORATORY RESULT (OUTPATIENT)
Age: 78
End: 2023-11-16

## 2023-11-16 ENCOUNTER — OUTPATIENT (OUTPATIENT)
Dept: OUTPATIENT SERVICES | Facility: HOSPITAL | Age: 78
LOS: 1 days | End: 2023-11-16
Payer: MEDICARE

## 2023-11-16 ENCOUNTER — APPOINTMENT (OUTPATIENT)
Dept: HEMATOLOGY ONCOLOGY | Facility: CLINIC | Age: 78
End: 2023-11-16
Payer: MEDICARE

## 2023-11-16 VITALS
SYSTOLIC BLOOD PRESSURE: 122 MMHG | HEART RATE: 71 BPM | BODY MASS INDEX: 34.53 KG/M2 | HEIGHT: 67 IN | DIASTOLIC BLOOD PRESSURE: 53 MMHG | WEIGHT: 220 LBS | OXYGEN SATURATION: 98 % | TEMPERATURE: 96.8 F | RESPIRATION RATE: 16 BRPM

## 2023-11-16 DIAGNOSIS — Z98.890 OTHER SPECIFIED POSTPROCEDURAL STATES: Chronic | ICD-10-CM

## 2023-11-16 DIAGNOSIS — Z98.891 HISTORY OF UTERINE SCAR FROM PREVIOUS SURGERY: Chronic | ICD-10-CM

## 2023-11-16 DIAGNOSIS — Z90.49 ACQUIRED ABSENCE OF OTHER SPECIFIED PARTS OF DIGESTIVE TRACT: Chronic | ICD-10-CM

## 2023-11-16 DIAGNOSIS — Z87.19 PERSONAL HISTORY OF OTHER DISEASES OF THE DIGESTIVE SYSTEM: Chronic | ICD-10-CM

## 2023-11-16 DIAGNOSIS — Z95.828 PRESENCE OF OTHER VASCULAR IMPLANTS AND GRAFTS: Chronic | ICD-10-CM

## 2023-11-16 DIAGNOSIS — D64.9 ANEMIA, UNSPECIFIED: ICD-10-CM

## 2023-11-16 PROCEDURE — 99213 OFFICE O/P EST LOW 20 MIN: CPT

## 2023-11-16 PROCEDURE — 86850 RBC ANTIBODY SCREEN: CPT

## 2023-11-16 PROCEDURE — 86901 BLOOD TYPING SEROLOGIC RH(D): CPT

## 2023-11-16 PROCEDURE — 86900 BLOOD TYPING SEROLOGIC ABO: CPT

## 2023-11-16 PROCEDURE — 86922 COMPATIBILITY TEST ANTIGLOB: CPT

## 2023-11-16 PROCEDURE — 86902 BLOOD TYPE ANTIGEN DONOR EA: CPT

## 2023-11-16 PROCEDURE — 85027 COMPLETE CBC AUTOMATED: CPT

## 2023-11-16 RX ORDER — TRANEXAMIC ACID 650 MG/1
650 TABLET ORAL TWICE DAILY
Qty: 120 | Refills: 0 | Status: ACTIVE | COMMUNITY
Start: 2023-05-12 | End: 1900-01-01

## 2023-11-17 ENCOUNTER — APPOINTMENT (OUTPATIENT)
Dept: INFUSION THERAPY | Facility: CLINIC | Age: 78
End: 2023-11-17

## 2023-11-17 ENCOUNTER — NON-APPOINTMENT (OUTPATIENT)
Age: 78
End: 2023-11-17

## 2023-11-17 ENCOUNTER — OUTPATIENT (OUTPATIENT)
Dept: OUTPATIENT SERVICES | Facility: HOSPITAL | Age: 78
LOS: 1 days | End: 2023-11-17
Payer: MEDICARE

## 2023-11-17 DIAGNOSIS — Z98.890 OTHER SPECIFIED POSTPROCEDURAL STATES: Chronic | ICD-10-CM

## 2023-11-17 DIAGNOSIS — D64.9 ANEMIA, UNSPECIFIED: ICD-10-CM

## 2023-11-17 DIAGNOSIS — Z90.49 ACQUIRED ABSENCE OF OTHER SPECIFIED PARTS OF DIGESTIVE TRACT: Chronic | ICD-10-CM

## 2023-11-17 DIAGNOSIS — Z98.891 HISTORY OF UTERINE SCAR FROM PREVIOUS SURGERY: Chronic | ICD-10-CM

## 2023-11-17 DIAGNOSIS — Z95.828 PRESENCE OF OTHER VASCULAR IMPLANTS AND GRAFTS: Chronic | ICD-10-CM

## 2023-11-17 DIAGNOSIS — Z87.19 PERSONAL HISTORY OF OTHER DISEASES OF THE DIGESTIVE SYSTEM: Chronic | ICD-10-CM

## 2023-11-17 LAB
ABO + RH PNL BLD: NORMAL
BLD GP AB SCN SERPL QL: NORMAL
HCT VFR BLD CALC: 21.6 %
HGB BLD-MCNC: 6.5 G/DL
MCHC RBC-ENTMCNC: 29.1 PG
MCHC RBC-ENTMCNC: 30.1 G/DL
MCV RBC AUTO: 96.9 FL
PLATELET # BLD AUTO: 109 K/UL
PMV BLD: 9.9 FL
RBC # BLD: 2.23 M/UL
RBC # FLD: 18.1 %
WBC # FLD AUTO: 2.26 K/UL

## 2023-11-17 PROCEDURE — 83615 LACTATE (LD) (LDH) ENZYME: CPT

## 2023-11-17 PROCEDURE — 83010 ASSAY OF HAPTOGLOBIN QUANT: CPT

## 2023-11-17 PROCEDURE — 96372 THER/PROPH/DIAG INJ SC/IM: CPT

## 2023-11-17 PROCEDURE — 85046 RETICYTE/HGB CONCENTRATE: CPT

## 2023-11-17 PROCEDURE — P9040: CPT

## 2023-11-17 PROCEDURE — 36430 TRANSFUSION BLD/BLD COMPNT: CPT

## 2023-11-17 RX ORDER — ERYTHROPOIETIN 10000 [IU]/ML
30000 INJECTION, SOLUTION INTRAVENOUS; SUBCUTANEOUS ONCE
Refills: 0 | Status: COMPLETED | OUTPATIENT
Start: 2023-11-17 | End: 2023-11-17

## 2023-11-17 RX ADMIN — ERYTHROPOIETIN 30000 UNIT(S): 10000 INJECTION, SOLUTION INTRAVENOUS; SUBCUTANEOUS at 13:03

## 2023-11-20 LAB
HAPTOGLOB SERPL-MCNC: 92 MG/DL
LDH SERPL-CCNC: 129
RETICS # AUTO: 5 %
RETICS AGGREG/RBC NFR: 95.5 K/UL

## 2023-11-20 NOTE — ED ADULT TRIAGE NOTE - ESI TRIAGE ACUITY LEVEL, MLM
3 General Well developed, well nourished, well hydrated in no acute distress  Head: atraumatic, normocephalic, no palpable boggy hematoma or tenderness. No damian sign.  Eyes: no icterus, no discharge, no conjunctivitis  Ears: no discharge, tympanic membranes nml bilat, no hemotympanum  Nose: Nares patent, no discharge, moist nasal mucosa  Throat: Oropharynx clear, moist oral mucosa, no exudates, uvula midline  Neck: no lymphadenopathy, no nuchal rigidity  CV- RRR, nml S1, S2 w no murmurs, cap refill 2 sec  Chest Faint abrasion to midsternal chest with tenderness. otherwise no obvious ecchymosis, bogginess, lacerations. No other chest wall tenderness.  Respiratory- CTAB, no wheezing or crackles, no accessory muscle use  Abdomen- Soft, NTND, no rigidity, no rebound, no guarding  Extremities- Moving all extremities.  Neuro Awake, alert interacting appropriate for age. CN II-XII intact, normal steady gait, normal strength and tone.  Skin- moist; without rash or erythema

## 2023-12-01 ENCOUNTER — OUTPATIENT (OUTPATIENT)
Dept: OUTPATIENT SERVICES | Facility: HOSPITAL | Age: 78
LOS: 1 days | End: 2023-12-01
Payer: MEDICARE

## 2023-12-01 ENCOUNTER — APPOINTMENT (OUTPATIENT)
Dept: INFUSION THERAPY | Facility: CLINIC | Age: 78
End: 2023-12-01

## 2023-12-01 VITALS — DIASTOLIC BLOOD PRESSURE: 46 MMHG | SYSTOLIC BLOOD PRESSURE: 108 MMHG | TEMPERATURE: 98 F | HEART RATE: 78 BPM

## 2023-12-01 VITALS — SYSTOLIC BLOOD PRESSURE: 124 MMHG | TEMPERATURE: 98 F | DIASTOLIC BLOOD PRESSURE: 58 MMHG | HEART RATE: 80 BPM

## 2023-12-01 DIAGNOSIS — E87.5 HYPERKALEMIA: ICD-10-CM

## 2023-12-01 DIAGNOSIS — Z98.891 HISTORY OF UTERINE SCAR FROM PREVIOUS SURGERY: Chronic | ICD-10-CM

## 2023-12-01 DIAGNOSIS — Z87.19 PERSONAL HISTORY OF OTHER DISEASES OF THE DIGESTIVE SYSTEM: Chronic | ICD-10-CM

## 2023-12-01 DIAGNOSIS — Z98.890 OTHER SPECIFIED POSTPROCEDURAL STATES: Chronic | ICD-10-CM

## 2023-12-01 DIAGNOSIS — Z90.49 ACQUIRED ABSENCE OF OTHER SPECIFIED PARTS OF DIGESTIVE TRACT: Chronic | ICD-10-CM

## 2023-12-01 DIAGNOSIS — Z95.828 PRESENCE OF OTHER VASCULAR IMPLANTS AND GRAFTS: Chronic | ICD-10-CM

## 2023-12-01 PROCEDURE — 96372 THER/PROPH/DIAG INJ SC/IM: CPT

## 2023-12-01 PROCEDURE — 36430 TRANSFUSION BLD/BLD COMPNT: CPT

## 2023-12-01 PROCEDURE — P9040: CPT

## 2023-12-01 RX ORDER — ERYTHROPOIETIN 10000 [IU]/ML
30000 INJECTION, SOLUTION INTRAVENOUS; SUBCUTANEOUS ONCE
Refills: 0 | Status: COMPLETED | OUTPATIENT
Start: 2023-12-01 | End: 2023-12-01

## 2023-12-01 RX ORDER — IRON SUCROSE 20 MG/ML
200 INJECTION, SOLUTION INTRAVENOUS ONCE
Refills: 0 | Status: DISCONTINUED | OUTPATIENT
Start: 2023-12-01 | End: 2023-12-01

## 2023-12-01 RX ORDER — FUROSEMIDE 40 MG
20 TABLET ORAL ONCE
Refills: 0 | Status: COMPLETED | OUTPATIENT
Start: 2023-12-01 | End: 2023-12-01

## 2023-12-01 RX ADMIN — ERYTHROPOIETIN 30000 UNIT(S): 10000 INJECTION, SOLUTION INTRAVENOUS; SUBCUTANEOUS at 13:47

## 2023-12-02 DIAGNOSIS — E87.5 HYPERKALEMIA: ICD-10-CM

## 2023-12-06 ENCOUNTER — INPATIENT (INPATIENT)
Facility: HOSPITAL | Age: 78
LOS: 2 days | Discharge: HOME | DRG: 378 | End: 2023-12-09
Attending: STUDENT IN AN ORGANIZED HEALTH CARE EDUCATION/TRAINING PROGRAM | Admitting: STUDENT IN AN ORGANIZED HEALTH CARE EDUCATION/TRAINING PROGRAM
Payer: MEDICARE

## 2023-12-06 VITALS
RESPIRATION RATE: 20 BRPM | HEART RATE: 78 BPM | WEIGHT: 210.1 LBS | TEMPERATURE: 98 F | SYSTOLIC BLOOD PRESSURE: 116 MMHG | OXYGEN SATURATION: 100 % | HEIGHT: 66 IN | DIASTOLIC BLOOD PRESSURE: 53 MMHG

## 2023-12-06 DIAGNOSIS — Z91.040 LATEX ALLERGY STATUS: ICD-10-CM

## 2023-12-06 DIAGNOSIS — K29.00 ACUTE GASTRITIS WITHOUT BLEEDING: ICD-10-CM

## 2023-12-06 DIAGNOSIS — N18.4 CHRONIC KIDNEY DISEASE, STAGE 4 (SEVERE): ICD-10-CM

## 2023-12-06 DIAGNOSIS — Z98.890 OTHER SPECIFIED POSTPROCEDURAL STATES: Chronic | ICD-10-CM

## 2023-12-06 DIAGNOSIS — Z90.49 ACQUIRED ABSENCE OF OTHER SPECIFIED PARTS OF DIGESTIVE TRACT: Chronic | ICD-10-CM

## 2023-12-06 DIAGNOSIS — U09.9 POST COVID-19 CONDITION, UNSPECIFIED: ICD-10-CM

## 2023-12-06 DIAGNOSIS — Z95.828 PRESENCE OF OTHER VASCULAR IMPLANTS AND GRAFTS: Chronic | ICD-10-CM

## 2023-12-06 DIAGNOSIS — Z88.6 ALLERGY STATUS TO ANALGESIC AGENT: ICD-10-CM

## 2023-12-06 DIAGNOSIS — D61.818 OTHER PANCYTOPENIA: ICD-10-CM

## 2023-12-06 DIAGNOSIS — Z87.19 PERSONAL HISTORY OF OTHER DISEASES OF THE DIGESTIVE SYSTEM: Chronic | ICD-10-CM

## 2023-12-06 DIAGNOSIS — Z88.0 ALLERGY STATUS TO PENICILLIN: ICD-10-CM

## 2023-12-06 DIAGNOSIS — Z98.891 HISTORY OF UTERINE SCAR FROM PREVIOUS SURGERY: Chronic | ICD-10-CM

## 2023-12-06 DIAGNOSIS — K31.811 ANGIODYSPLASIA OF STOMACH AND DUODENUM WITH BLEEDING: ICD-10-CM

## 2023-12-06 DIAGNOSIS — D64.9 ANEMIA, UNSPECIFIED: ICD-10-CM

## 2023-12-06 DIAGNOSIS — D62 ACUTE POSTHEMORRHAGIC ANEMIA: ICD-10-CM

## 2023-12-06 LAB
ABO RH CONFIRMATION: SIGNIFICANT CHANGE UP
ABO RH CONFIRMATION: SIGNIFICANT CHANGE UP
ACANTHOCYTES BLD QL SMEAR: SLIGHT — SIGNIFICANT CHANGE UP
ACANTHOCYTES BLD QL SMEAR: SLIGHT — SIGNIFICANT CHANGE UP
ALBUMIN SERPL ELPH-MCNC: 3.2 G/DL — LOW (ref 3.5–5.2)
ALBUMIN SERPL ELPH-MCNC: 3.2 G/DL — LOW (ref 3.5–5.2)
ALP SERPL-CCNC: 87 U/L — SIGNIFICANT CHANGE UP (ref 30–115)
ALP SERPL-CCNC: 87 U/L — SIGNIFICANT CHANGE UP (ref 30–115)
ALT FLD-CCNC: <5 U/L — SIGNIFICANT CHANGE UP (ref 0–41)
ALT FLD-CCNC: <5 U/L — SIGNIFICANT CHANGE UP (ref 0–41)
ANION GAP SERPL CALC-SCNC: 11 MMOL/L — SIGNIFICANT CHANGE UP (ref 7–14)
ANION GAP SERPL CALC-SCNC: 11 MMOL/L — SIGNIFICANT CHANGE UP (ref 7–14)
ANISOCYTOSIS BLD QL: SLIGHT — SIGNIFICANT CHANGE UP
ANISOCYTOSIS BLD QL: SLIGHT — SIGNIFICANT CHANGE UP
APTT BLD: 49.4 SEC — HIGH (ref 27–39.2)
APTT BLD: 49.4 SEC — HIGH (ref 27–39.2)
AST SERPL-CCNC: 12 U/L — SIGNIFICANT CHANGE UP (ref 0–41)
AST SERPL-CCNC: 12 U/L — SIGNIFICANT CHANGE UP (ref 0–41)
BASO STIPL BLD QL SMEAR: PRESENT — SIGNIFICANT CHANGE UP
BASO STIPL BLD QL SMEAR: PRESENT — SIGNIFICANT CHANGE UP
BASOPHILS # BLD AUTO: 0.04 K/UL — SIGNIFICANT CHANGE UP (ref 0–0.2)
BASOPHILS # BLD AUTO: 0.04 K/UL — SIGNIFICANT CHANGE UP (ref 0–0.2)
BASOPHILS NFR BLD AUTO: 1.8 % — HIGH (ref 0–1)
BASOPHILS NFR BLD AUTO: 1.8 % — HIGH (ref 0–1)
BILIRUB SERPL-MCNC: 0.5 MG/DL — SIGNIFICANT CHANGE UP (ref 0.2–1.2)
BILIRUB SERPL-MCNC: 0.5 MG/DL — SIGNIFICANT CHANGE UP (ref 0.2–1.2)
BLD GP AB SCN SERPL QL: SIGNIFICANT CHANGE UP
BLD GP AB SCN SERPL QL: SIGNIFICANT CHANGE UP
BUN SERPL-MCNC: 65 MG/DL — CRITICAL HIGH (ref 10–20)
BUN SERPL-MCNC: 65 MG/DL — CRITICAL HIGH (ref 10–20)
CALCIUM SERPL-MCNC: 7.8 MG/DL — LOW (ref 8.4–10.4)
CALCIUM SERPL-MCNC: 7.8 MG/DL — LOW (ref 8.4–10.4)
CHLORIDE SERPL-SCNC: 112 MMOL/L — HIGH (ref 98–110)
CHLORIDE SERPL-SCNC: 112 MMOL/L — HIGH (ref 98–110)
CO2 SERPL-SCNC: 19 MMOL/L — SIGNIFICANT CHANGE UP (ref 17–32)
CO2 SERPL-SCNC: 19 MMOL/L — SIGNIFICANT CHANGE UP (ref 17–32)
CREAT SERPL-MCNC: 2.4 MG/DL — HIGH (ref 0.7–1.5)
CREAT SERPL-MCNC: 2.4 MG/DL — HIGH (ref 0.7–1.5)
DACRYOCYTES BLD QL SMEAR: SLIGHT — SIGNIFICANT CHANGE UP
DACRYOCYTES BLD QL SMEAR: SLIGHT — SIGNIFICANT CHANGE UP
EGFR: 20 ML/MIN/1.73M2 — LOW
EGFR: 20 ML/MIN/1.73M2 — LOW
ELLIPTOCYTES BLD QL SMEAR: SLIGHT — SIGNIFICANT CHANGE UP
ELLIPTOCYTES BLD QL SMEAR: SLIGHT — SIGNIFICANT CHANGE UP
EOSINOPHIL # BLD AUTO: 0.16 K/UL — SIGNIFICANT CHANGE UP (ref 0–0.7)
EOSINOPHIL # BLD AUTO: 0.16 K/UL — SIGNIFICANT CHANGE UP (ref 0–0.7)
EOSINOPHIL NFR BLD AUTO: 7 % — SIGNIFICANT CHANGE UP (ref 0–8)
EOSINOPHIL NFR BLD AUTO: 7 % — SIGNIFICANT CHANGE UP (ref 0–8)
GIANT PLATELETS BLD QL SMEAR: PRESENT — SIGNIFICANT CHANGE UP
GIANT PLATELETS BLD QL SMEAR: PRESENT — SIGNIFICANT CHANGE UP
GLUCOSE SERPL-MCNC: 93 MG/DL — SIGNIFICANT CHANGE UP (ref 70–99)
GLUCOSE SERPL-MCNC: 93 MG/DL — SIGNIFICANT CHANGE UP (ref 70–99)
HCT VFR BLD CALC: 14.5 % — LOW (ref 37–47)
HCT VFR BLD CALC: 14.5 % — LOW (ref 37–47)
HGB BLD-MCNC: 4.3 G/DL — CRITICAL LOW (ref 12–16)
HGB BLD-MCNC: 4.3 G/DL — CRITICAL LOW (ref 12–16)
INR BLD: 1.05 RATIO — SIGNIFICANT CHANGE UP (ref 0.65–1.3)
INR BLD: 1.05 RATIO — SIGNIFICANT CHANGE UP (ref 0.65–1.3)
LYMPHOCYTES # BLD AUTO: 0.5 K/UL — LOW (ref 1.2–3.4)
LYMPHOCYTES # BLD AUTO: 0.5 K/UL — LOW (ref 1.2–3.4)
LYMPHOCYTES # BLD AUTO: 21.9 % — SIGNIFICANT CHANGE UP (ref 20.5–51.1)
LYMPHOCYTES # BLD AUTO: 21.9 % — SIGNIFICANT CHANGE UP (ref 20.5–51.1)
MANUAL SMEAR VERIFICATION: SIGNIFICANT CHANGE UP
MANUAL SMEAR VERIFICATION: SIGNIFICANT CHANGE UP
MCHC RBC-ENTMCNC: 28.1 PG — SIGNIFICANT CHANGE UP (ref 27–31)
MCHC RBC-ENTMCNC: 28.1 PG — SIGNIFICANT CHANGE UP (ref 27–31)
MCHC RBC-ENTMCNC: 29.7 G/DL — LOW (ref 32–37)
MCHC RBC-ENTMCNC: 29.7 G/DL — LOW (ref 32–37)
MCV RBC AUTO: 94.8 FL — SIGNIFICANT CHANGE UP (ref 81–99)
MCV RBC AUTO: 94.8 FL — SIGNIFICANT CHANGE UP (ref 81–99)
MICROCYTES BLD QL: SLIGHT — SIGNIFICANT CHANGE UP
MICROCYTES BLD QL: SLIGHT — SIGNIFICANT CHANGE UP
MONOCYTES # BLD AUTO: 0.18 K/UL — SIGNIFICANT CHANGE UP (ref 0.1–0.6)
MONOCYTES # BLD AUTO: 0.18 K/UL — SIGNIFICANT CHANGE UP (ref 0.1–0.6)
MONOCYTES NFR BLD AUTO: 7.9 % — SIGNIFICANT CHANGE UP (ref 1.7–9.3)
MONOCYTES NFR BLD AUTO: 7.9 % — SIGNIFICANT CHANGE UP (ref 1.7–9.3)
NEUTROPHILS # BLD AUTO: 1.41 K/UL — SIGNIFICANT CHANGE UP (ref 1.4–6.5)
NEUTROPHILS # BLD AUTO: 1.41 K/UL — SIGNIFICANT CHANGE UP (ref 1.4–6.5)
NEUTROPHILS NFR BLD AUTO: 61.4 % — SIGNIFICANT CHANGE UP (ref 42.2–75.2)
NEUTROPHILS NFR BLD AUTO: 61.4 % — SIGNIFICANT CHANGE UP (ref 42.2–75.2)
NT-PROBNP SERPL-SCNC: 2782 PG/ML — HIGH (ref 0–300)
NT-PROBNP SERPL-SCNC: 2782 PG/ML — HIGH (ref 0–300)
PLAT MORPH BLD: NORMAL — SIGNIFICANT CHANGE UP
PLAT MORPH BLD: NORMAL — SIGNIFICANT CHANGE UP
PLATELET # BLD AUTO: 101 K/UL — LOW (ref 130–400)
PLATELET # BLD AUTO: 101 K/UL — LOW (ref 130–400)
PMV BLD: 10.8 FL — HIGH (ref 7.4–10.4)
PMV BLD: 10.8 FL — HIGH (ref 7.4–10.4)
POIKILOCYTOSIS BLD QL AUTO: SLIGHT — SIGNIFICANT CHANGE UP
POIKILOCYTOSIS BLD QL AUTO: SLIGHT — SIGNIFICANT CHANGE UP
POLYCHROMASIA BLD QL SMEAR: SLIGHT — SIGNIFICANT CHANGE UP
POLYCHROMASIA BLD QL SMEAR: SLIGHT — SIGNIFICANT CHANGE UP
POTASSIUM SERPL-MCNC: 4.6 MMOL/L — SIGNIFICANT CHANGE UP (ref 3.5–5)
POTASSIUM SERPL-MCNC: 4.6 MMOL/L — SIGNIFICANT CHANGE UP (ref 3.5–5)
POTASSIUM SERPL-SCNC: 4.6 MMOL/L — SIGNIFICANT CHANGE UP (ref 3.5–5)
POTASSIUM SERPL-SCNC: 4.6 MMOL/L — SIGNIFICANT CHANGE UP (ref 3.5–5)
PROT SERPL-MCNC: 5.1 G/DL — LOW (ref 6–8)
PROT SERPL-MCNC: 5.1 G/DL — LOW (ref 6–8)
PROTHROM AB SERPL-ACNC: 12 SEC — SIGNIFICANT CHANGE UP (ref 9.95–12.87)
PROTHROM AB SERPL-ACNC: 12 SEC — SIGNIFICANT CHANGE UP (ref 9.95–12.87)
RBC # BLD: 1.53 M/UL — LOW (ref 4.2–5.4)
RBC # BLD: 1.53 M/UL — LOW (ref 4.2–5.4)
RBC # FLD: 18.3 % — HIGH (ref 11.5–14.5)
RBC # FLD: 18.3 % — HIGH (ref 11.5–14.5)
RBC BLD AUTO: ABNORMAL
RBC BLD AUTO: ABNORMAL
SODIUM SERPL-SCNC: 142 MMOL/L — SIGNIFICANT CHANGE UP (ref 135–146)
SODIUM SERPL-SCNC: 142 MMOL/L — SIGNIFICANT CHANGE UP (ref 135–146)
TROPONIN T SERPL-MCNC: 0.02 NG/ML — HIGH
TROPONIN T SERPL-MCNC: 0.02 NG/ML — HIGH
WBC # BLD: 2.3 K/UL — LOW (ref 4.8–10.8)
WBC # BLD: 2.3 K/UL — LOW (ref 4.8–10.8)
WBC # FLD AUTO: 2.3 K/UL — LOW (ref 4.8–10.8)
WBC # FLD AUTO: 2.3 K/UL — LOW (ref 4.8–10.8)

## 2023-12-06 PROCEDURE — 85027 COMPLETE CBC AUTOMATED: CPT

## 2023-12-06 PROCEDURE — C9113: CPT

## 2023-12-06 PROCEDURE — 36415 COLL VENOUS BLD VENIPUNCTURE: CPT

## 2023-12-06 PROCEDURE — 93975 VASCULAR STUDY: CPT

## 2023-12-06 PROCEDURE — 93010 ELECTROCARDIOGRAM REPORT: CPT

## 2023-12-06 PROCEDURE — 84484 ASSAY OF TROPONIN QUANT: CPT

## 2023-12-06 PROCEDURE — 76705 ECHO EXAM OF ABDOMEN: CPT

## 2023-12-06 PROCEDURE — 71045 X-RAY EXAM CHEST 1 VIEW: CPT | Mod: 26

## 2023-12-06 PROCEDURE — 85610 PROTHROMBIN TIME: CPT

## 2023-12-06 PROCEDURE — 36430 TRANSFUSION BLD/BLD COMPNT: CPT

## 2023-12-06 PROCEDURE — 86900 BLOOD TYPING SEROLOGIC ABO: CPT

## 2023-12-06 PROCEDURE — 99285 EMERGENCY DEPT VISIT HI MDM: CPT

## 2023-12-06 PROCEDURE — 80053 COMPREHEN METABOLIC PANEL: CPT

## 2023-12-06 PROCEDURE — 86850 RBC ANTIBODY SCREEN: CPT

## 2023-12-06 PROCEDURE — P9040: CPT

## 2023-12-06 PROCEDURE — 85730 THROMBOPLASTIN TIME PARTIAL: CPT

## 2023-12-06 PROCEDURE — 86901 BLOOD TYPING SEROLOGIC RH(D): CPT

## 2023-12-06 RX ORDER — PANTOPRAZOLE SODIUM 20 MG/1
80 TABLET, DELAYED RELEASE ORAL ONCE
Refills: 0 | Status: COMPLETED | OUTPATIENT
Start: 2023-12-06 | End: 2023-12-06

## 2023-12-06 RX ORDER — PANTOPRAZOLE SODIUM 20 MG/1
40 TABLET, DELAYED RELEASE ORAL
Refills: 0 | Status: DISCONTINUED | OUTPATIENT
Start: 2023-12-06 | End: 2023-12-09

## 2023-12-06 RX ADMIN — PANTOPRAZOLE SODIUM 80 MILLIGRAM(S): 20 TABLET, DELAYED RELEASE ORAL at 19:07

## 2023-12-06 NOTE — ED PROVIDER NOTE - ATTENDING APP SHARED VISIT CONTRIBUTION OF CARE
78yF AS w/ intestinal AVMs and chronic GIB causing anemia on weekly transfusions and iron therapy c/o generalized weakness and SOB - says she feels like she does when her Hgb get really low and she didn't think she could wait another few days until her next scheduled transfusion.

## 2023-12-06 NOTE — H&P ADULT - HISTORY OF PRESENT ILLNESS
78-year-old female history of anemia 2/2 CKD and chronic upper GIB due to AV malformations and gastric body dieulafoy lesion, severe aortic stenosis pending TAVR, presenting to ER for evaluation.  Patient  has weekly blood transfusions with Dr. Kwan.  is supposed to have blood work done tomorrow but cannot wait.   is now feeling very weak with associated shortness of breath and mild chest tightness.  Symptoms are similar to prior anemia requiring blood transfusion.  She denies any recent dark or tarry stool, bright red blood per rectum, abdominal pain, nausea, vomiting, fever, chills, syncope      In the ED: BP: 116/53. HR: 78, RR: 20, Temp; 97.9, SpO2: 100 on RA    Labs:  -> WBC: 2.4 baseline, H.3 (baseline ~8), MCV: 94.8, plt: 101  -> Na: 142, K: 4.6, BUN: 65, crea: 2.4 at baseline  -> Trop: 0.02, BNP: 2782    s/p Pantoprazole 80 IV once and 3 u PRBC ordered 78-year-old female history of anemia 2/2 CKD and chronic upper GIB due to AV malformations and gastric body dieulafoy lesion, severe aortic stenosis pending TAVR, presenting to ER for evaluation.  Patient  has weekly blood transfusions with Dr. Gaston (last one 5 days ago).  is supposed to have blood work done tomorrow but cannot wait.   is now feeling very weak with associated shortness of breath and mild chest tightness.  Symptoms are similar to prior anemia requiring blood transfusion.  She denies any recent dark or tarry stool, bright red blood per rectum, abdominal pain, nausea, vomiting, fever, chills, syncope  Refused OLEKSANDR    In the ED: BP: 116/53. HR: 78, RR: 20, Temp; 97.9, SpO2: 100 on RA    Labs:  -> WBC: 2.4 baseline, H.3 (baseline ~8), MCV: 94.8, plt: 101  -> Na: 142, K: 4.6, BUN: 65, crea: 2.4 at baseline  -> Trop: 0.02, BNP: 2782    s/p Pantoprazole 80 IV once and 3 u PRBC ordered

## 2023-12-06 NOTE — ED PROVIDER NOTE - OBJECTIVE STATEMENT
78-year-old female history of anemia requiring frequent transfusions, prior GI bleed due to AV malformations and gastric body, dieulafoy lesion, severe aortic stenosis pending TAVR, CKD presenting to ER for evaluation.  Patient  has weekly blood transfusions with Dr. Kwan.   is supposed to have blood work done tomorrow but cannot wait.   is now feeling very weak with associated shortness of breath and mild chest tightness.  Symptoms are similar to prior anemia requiring blood transfusion.  She denies any recent dark or tarry stool, bright red blood per rectum, abdominal pain, nausea, vomiting, fever, chills, syncope

## 2023-12-06 NOTE — ED PROVIDER NOTE - CLINICAL SUMMARY MEDICAL DECISION MAKING FREE TEXT BOX
78yF severe anemia on weekly transfusions p/w weakness and SOB c/w her sx when her anemia worsens.  Pt hemodynamically stable and w/o resp distress or aisha GIB.  Labs w/ Hgb ~4 and CKD near baseline.  Pt ordered 3u PRBC and will adm for close monitoring and further care.

## 2023-12-06 NOTE — ED ADULT TRIAGE NOTE - CHIEF COMPLAINT QUOTE
BIBA from home for "feeling weak", pt is scheduled for weekly blood and iron transfusions, but pt didn't think she could wait until her appointment on Friday for her transfusion

## 2023-12-06 NOTE — H&P ADULT - NSHPREVIEWOFSYSTEMS_GEN_ALL_CORE
REVIEW OF SYSTEMS:    CONSTITUTIONAL:  Weakness, fatigue  EYES/ENT:  No visual changes;  No vertigo or throat pain   NECK:  No pain or stiffness  RESPIRATORY:  No cough, wheezing, hemoptysis; No shortness of breath  CARDIOVASCULAR:  No chest pain or palpitations  GASTROINTESTINAL:  No abdominal or epigastric pain. No nausea, vomiting, or hematemesis; No diarrhea or constipation. No melena or hematochezia.  GENITOURINARY:  No dysuria, frequency or hematuria  NEUROLOGICAL:  No numbness or weakness  SKIN:  No itching, rashes

## 2023-12-06 NOTE — ED PROVIDER NOTE - DIFFERENTIAL DIAGNOSIS
severe symptomatic anemia requiring transfusion, ACS, CHF, pleural effusion, pericardial effusion, PNA, PE Differential Diagnosis

## 2023-12-07 ENCOUNTER — TRANSCRIPTION ENCOUNTER (OUTPATIENT)
Age: 78
End: 2023-12-07

## 2023-12-07 LAB
ALBUMIN SERPL ELPH-MCNC: 3.1 G/DL — LOW (ref 3.5–5.2)
ALBUMIN SERPL ELPH-MCNC: 3.1 G/DL — LOW (ref 3.5–5.2)
ALP SERPL-CCNC: 89 U/L — SIGNIFICANT CHANGE UP (ref 30–115)
ALP SERPL-CCNC: 89 U/L — SIGNIFICANT CHANGE UP (ref 30–115)
ALT FLD-CCNC: <5 U/L — SIGNIFICANT CHANGE UP (ref 0–41)
ALT FLD-CCNC: <5 U/L — SIGNIFICANT CHANGE UP (ref 0–41)
ANION GAP SERPL CALC-SCNC: 9 MMOL/L — SIGNIFICANT CHANGE UP (ref 7–14)
ANION GAP SERPL CALC-SCNC: 9 MMOL/L — SIGNIFICANT CHANGE UP (ref 7–14)
APTT BLD: 32.9 SEC — SIGNIFICANT CHANGE UP (ref 27–39.2)
APTT BLD: 32.9 SEC — SIGNIFICANT CHANGE UP (ref 27–39.2)
AST SERPL-CCNC: 11 U/L — SIGNIFICANT CHANGE UP (ref 0–41)
AST SERPL-CCNC: 11 U/L — SIGNIFICANT CHANGE UP (ref 0–41)
BILIRUB SERPL-MCNC: 1.3 MG/DL — HIGH (ref 0.2–1.2)
BILIRUB SERPL-MCNC: 1.3 MG/DL — HIGH (ref 0.2–1.2)
BLD GP AB SCN SERPL QL: SIGNIFICANT CHANGE UP
BLD GP AB SCN SERPL QL: SIGNIFICANT CHANGE UP
BUN SERPL-MCNC: 63 MG/DL — CRITICAL HIGH (ref 10–20)
BUN SERPL-MCNC: 63 MG/DL — CRITICAL HIGH (ref 10–20)
CALCIUM SERPL-MCNC: 8.1 MG/DL — LOW (ref 8.4–10.5)
CALCIUM SERPL-MCNC: 8.1 MG/DL — LOW (ref 8.4–10.5)
CHLORIDE SERPL-SCNC: 111 MMOL/L — HIGH (ref 98–110)
CHLORIDE SERPL-SCNC: 111 MMOL/L — HIGH (ref 98–110)
CO2 SERPL-SCNC: 23 MMOL/L — SIGNIFICANT CHANGE UP (ref 17–32)
CO2 SERPL-SCNC: 23 MMOL/L — SIGNIFICANT CHANGE UP (ref 17–32)
CREAT SERPL-MCNC: 2.6 MG/DL — HIGH (ref 0.7–1.5)
CREAT SERPL-MCNC: 2.6 MG/DL — HIGH (ref 0.7–1.5)
EGFR: 18 ML/MIN/1.73M2 — LOW
EGFR: 18 ML/MIN/1.73M2 — LOW
GLUCOSE SERPL-MCNC: 89 MG/DL — SIGNIFICANT CHANGE UP (ref 70–99)
GLUCOSE SERPL-MCNC: 89 MG/DL — SIGNIFICANT CHANGE UP (ref 70–99)
HCT VFR BLD CALC: 24.2 % — LOW (ref 37–47)
HCT VFR BLD CALC: 24.2 % — LOW (ref 37–47)
HGB BLD-MCNC: 7.4 G/DL — LOW (ref 12–16)
HGB BLD-MCNC: 7.4 G/DL — LOW (ref 12–16)
INR BLD: 1.01 RATIO — SIGNIFICANT CHANGE UP (ref 0.65–1.3)
INR BLD: 1.01 RATIO — SIGNIFICANT CHANGE UP (ref 0.65–1.3)
MCHC RBC-ENTMCNC: 28.7 PG — SIGNIFICANT CHANGE UP (ref 27–31)
MCHC RBC-ENTMCNC: 28.7 PG — SIGNIFICANT CHANGE UP (ref 27–31)
MCHC RBC-ENTMCNC: 30.6 G/DL — LOW (ref 32–37)
MCHC RBC-ENTMCNC: 30.6 G/DL — LOW (ref 32–37)
MCV RBC AUTO: 93.8 FL — SIGNIFICANT CHANGE UP (ref 81–99)
MCV RBC AUTO: 93.8 FL — SIGNIFICANT CHANGE UP (ref 81–99)
NRBC # BLD: 0 /100 WBCS — SIGNIFICANT CHANGE UP (ref 0–0)
NRBC # BLD: 0 /100 WBCS — SIGNIFICANT CHANGE UP (ref 0–0)
PLATELET # BLD AUTO: 102 K/UL — LOW (ref 130–400)
PLATELET # BLD AUTO: 102 K/UL — LOW (ref 130–400)
PMV BLD: 10.6 FL — HIGH (ref 7.4–10.4)
PMV BLD: 10.6 FL — HIGH (ref 7.4–10.4)
POTASSIUM SERPL-MCNC: 5.1 MMOL/L — HIGH (ref 3.5–5)
POTASSIUM SERPL-MCNC: 5.1 MMOL/L — HIGH (ref 3.5–5)
POTASSIUM SERPL-SCNC: 5.1 MMOL/L — HIGH (ref 3.5–5)
POTASSIUM SERPL-SCNC: 5.1 MMOL/L — HIGH (ref 3.5–5)
PROT SERPL-MCNC: 5.5 G/DL — LOW (ref 6–8)
PROT SERPL-MCNC: 5.5 G/DL — LOW (ref 6–8)
PROTHROM AB SERPL-ACNC: 11.5 SEC — SIGNIFICANT CHANGE UP (ref 9.95–12.87)
PROTHROM AB SERPL-ACNC: 11.5 SEC — SIGNIFICANT CHANGE UP (ref 9.95–12.87)
RBC # BLD: 2.58 M/UL — LOW (ref 4.2–5.4)
RBC # BLD: 2.58 M/UL — LOW (ref 4.2–5.4)
RBC # FLD: 16.5 % — HIGH (ref 11.5–14.5)
RBC # FLD: 16.5 % — HIGH (ref 11.5–14.5)
SODIUM SERPL-SCNC: 143 MMOL/L — SIGNIFICANT CHANGE UP (ref 135–146)
SODIUM SERPL-SCNC: 143 MMOL/L — SIGNIFICANT CHANGE UP (ref 135–146)
TROPONIN T SERPL-MCNC: 0.01 NG/ML — SIGNIFICANT CHANGE UP
TROPONIN T SERPL-MCNC: 0.01 NG/ML — SIGNIFICANT CHANGE UP
WBC # BLD: 3.79 K/UL — LOW (ref 4.8–10.8)
WBC # BLD: 3.79 K/UL — LOW (ref 4.8–10.8)
WBC # FLD AUTO: 3.79 K/UL — LOW (ref 4.8–10.8)
WBC # FLD AUTO: 3.79 K/UL — LOW (ref 4.8–10.8)

## 2023-12-07 PROCEDURE — 99223 1ST HOSP IP/OBS HIGH 75: CPT

## 2023-12-07 PROCEDURE — 43235 EGD DIAGNOSTIC BRUSH WASH: CPT | Mod: 59

## 2023-12-07 PROCEDURE — 44366 SMALL BOWEL ENDOSCOPY: CPT

## 2023-12-07 PROCEDURE — 99222 1ST HOSP IP/OBS MODERATE 55: CPT

## 2023-12-07 RX ORDER — FOLIC ACID 0.8 MG
1 TABLET ORAL DAILY
Refills: 0 | Status: DISCONTINUED | OUTPATIENT
Start: 2023-12-07 | End: 2023-12-09

## 2023-12-07 RX ORDER — FUROSEMIDE 40 MG
1 TABLET ORAL
Refills: 0 | DISCHARGE

## 2023-12-07 RX ORDER — METOPROLOL TARTRATE 50 MG
25 TABLET ORAL
Refills: 0 | Status: DISCONTINUED | OUTPATIENT
Start: 2023-12-07 | End: 2023-12-09

## 2023-12-07 RX ORDER — DIPHENHYDRAMINE HCL 50 MG
1 CAPSULE ORAL
Refills: 0 | DISCHARGE

## 2023-12-07 RX ORDER — SODIUM BICARBONATE 1 MEQ/ML
650 SYRINGE (ML) INTRAVENOUS EVERY 8 HOURS
Refills: 0 | Status: DISCONTINUED | OUTPATIENT
Start: 2023-12-07 | End: 2023-12-09

## 2023-12-07 RX ORDER — FUROSEMIDE 40 MG
40 TABLET ORAL ONCE
Refills: 0 | Status: COMPLETED | OUTPATIENT
Start: 2023-12-07 | End: 2023-12-07

## 2023-12-07 RX ORDER — FUROSEMIDE 40 MG
40 TABLET ORAL DAILY
Refills: 0 | Status: DISCONTINUED | OUTPATIENT
Start: 2023-12-07 | End: 2023-12-09

## 2023-12-07 RX ORDER — DIPHENHYDRAMINE HCL 50 MG
25 CAPSULE ORAL AT BEDTIME
Refills: 0 | Status: DISCONTINUED | OUTPATIENT
Start: 2023-12-07 | End: 2023-12-09

## 2023-12-07 RX ADMIN — Medication 1 MILLIGRAM(S): at 11:47

## 2023-12-07 RX ADMIN — Medication 650 MILLIGRAM(S): at 08:15

## 2023-12-07 RX ADMIN — PANTOPRAZOLE SODIUM 40 MILLIGRAM(S): 20 TABLET, DELAYED RELEASE ORAL at 08:15

## 2023-12-07 RX ADMIN — PANTOPRAZOLE SODIUM 40 MILLIGRAM(S): 20 TABLET, DELAYED RELEASE ORAL at 19:28

## 2023-12-07 RX ADMIN — Medication 40 MILLIGRAM(S): at 08:15

## 2023-12-07 RX ADMIN — Medication 650 MILLIGRAM(S): at 21:29

## 2023-12-07 RX ADMIN — Medication 40 MILLIGRAM(S): at 11:46

## 2023-12-07 RX ADMIN — Medication 25 MILLIGRAM(S): at 08:15

## 2023-12-07 RX ADMIN — Medication 25 MILLIGRAM(S): at 19:29

## 2023-12-07 RX ADMIN — Medication 25 MILLIGRAM(S): at 21:29

## 2023-12-07 NOTE — PRE-ANESTHESIA EVALUATION ADULT - NSANTHPMHFT_GEN_ALL_CORE
finished one unit of PRBC from floor, last Hb before Transfusion was 7.4  Severe AS  CKD   low plt 102 K  K 501  obese, not tested for JOSETTE,   No SOB, CP or dizzyness at this moment  lungs clear

## 2023-12-07 NOTE — CONSULT NOTE ADULT - SUBJECTIVE AND OBJECTIVE BOX
Outpt cardiologist: Dr Hernandez    HPI:  78-year-old female history of anemia 2/2 CKD and chronic upper GIB due to AV malformations and gastric body dieulafoy lesion, severe aortic stenosis pending TAVR, presenting to ER for evaluation.  Patient  has weekly blood transfusions with Dr. Gaston (last one 5 days ago).  is supposed to have blood work done tomorrow but cannot wait.   is now feeling very weak with associated shortness of breath and mild chest tightness.  Symptoms are similar to prior anemia requiring blood transfusion.  She denies any recent dark or tarry stool, bright red blood per rectum, abdominal pain, nausea, vomiting, fever, chills, syncope  Refused OLEKSANDR    In the ED: BP: 116/53. HR: 78, RR: 20, Temp; 97.9, SpO2: 100 on RA    Labs:  -> WBC: 2.4 baseline, H.3 (baseline ~8), MCV: 94.8, plt: 101  -> Na: 142, K: 4.6, BUN: 65, crea: 2.4 at baseline  -> Trop: 0.02, BNP: 2782    s/p Pantoprazole 80 IV once and 3 u PRBC ordered (06 Dec 2023 23:11)      ---  cardio fellow additional notes:    79 y/o F with OMHx of HTN, CKD IV, Severe AS, Hx of GIB s/p EGD/C scope/Enteroscopy GAVE, small bowel AVMs s/p phototherapy p/w symptomatic anemia. Cardio consulted for pre op.      PAST MEDICAL & SURGICAL HISTORY  HTN (hypertension)    Heart murmur    Eczema    Anemia  iron infusions and PRBC transfusions    Aortic stenosis    Chronic kidney disease (CKD)    2019 novel coronavirus disease (COVID-19)  2020- [facility] admission    Gastric AVM    H/O appendicitis    S/P cholecystectomy    History of esophagogastroduodenoscopy (EGD)    H/O colonoscopy    H/O  section    History of appendectomy    Port-A-Cath in place  right CW      FAMILY HISTORY:  FAMILY HISTORY:  FH: kidney disease (Father)    FH: breast cancer (Mother)    FH: multiple myeloma (Mother)      SOCIAL HISTORY:  Social History:    ALLERGIES:  EKG leads (Hives)  penicillins (Rash; Urticaria; Hives; Blisters)  latex (Unknown)  aspirin (Rash; Other)      MEDICATIONS:  diphenhydrAMINE 25 milliGRAM(s) Oral at bedtime  folic acid 1 milliGRAM(s) Oral daily  furosemide    Tablet 40 milliGRAM(s) Oral daily  metoprolol tartrate 25 milliGRAM(s) Oral two times a day  pantoprazole  Injectable 40 milliGRAM(s) IV Push two times a day  sodium bicarbonate 650 milliGRAM(s) Oral every 8 hours    HOME MEDICATIONS:  Home Medications:  diphenhydrAMINE 25 mg oral capsule: 1 orally once a day (at bedtime) (07 Dec 2023 00:14)  folic acid 1 mg oral tablet: 1 tab(s) orally once a day (07 Dec 2023 00:14)  furosemide 40 mg oral tablet: 1 tab(s) orally once a day (07 Dec 2023 00:14)  metoprolol tartrate 25 mg oral tablet: 1 tab(s) orally 2 times a day (07 Dec 2023 00:14)      VITALS:   T(F): 98.2 ( @ 12:03), Max: 98.2 ( @ 10:30)  HR: 60 ( @ 12:03) (60 - 85)  BP: 107/52 ( @ 12:03) (103/46 - 116/53)  BP(mean): 70 ( @ 10:30) (70 - 70)  RR: 18 ( @ 12:03) (18 - 20)  SpO2: 98% ( @ 10:30) (98% - 100%)    I&O's Summary      REVIEW OF SYSTEMS:  CONSTITUTIONAL: No weakness, fevers or chills  HEENT: No visual changes, neck/ear pain  RESPIRATORY: No cough   CARDIOVASCULAR: See HPI  GASTROINTESTINAL: see HPI  GENITOURINARY: No dysuria, frequency or hematuria  NEUROLOGICAL: No new focal deficits  SKIN: No new rashes    PHYSICAL EXAM:  General: Not in distress.     HEENT: EOMI  Cardio: regular, S1, S2, KATHYA 3/6 radiating to carotids  Pulm: B/L rales and basal crackles  Abdomen: Soft, non-tender, non-distended. Normoactive bowel sounds  Extremities: No edema b/l le  Neuro: A&O x3. No focal deficits    LABS:                        4.3    2.30  )-----------( 101      ( 06 Dec 2023 17:52 )             14.5     12-06    142  |  112<H>  |  65<HH>  ----------------------------<  93  4.6   |  19  |  2.4<H>    Ca    7.8<L>      06 Dec 2023 17:52    TPro  5.1<L>  /  Alb  3.2<L>  /  TBili  0.5  /  DBili  x   /  AST  12  /  ALT  <5  /  AlkPhos  87      PT/INR - ( 06 Dec 2023 17:52 )   PT: 12.00 sec;   INR: 1.05 ratio         PTT - ( 06 Dec 2023 17:52 )  PTT:49.4 sec  Troponin T, Serum: 0.02 ng/mL *H* (23 @ 17:52)    CARDIAC MARKERS ( 06 Dec 2023 17:52 )  x     / 0.02 ng/mL / x     / x     / x              RADIOLOGY:  -CXR: NA  -TTE: < from: TTE Echo Complete w/o Contrast w/ Doppler (23 @ 14:29) >  Summary:   1. Normal global left ventricular systolic function.   2. LV Ejection Fraction by Borwn's Method with a biplane EF of 62 %.   3. Mildly increased LV wall thickness.   4. Spectral Doppler shows pseudonormal pattern of left ventricular   myocardial filling (Grade II diastolic dysfunction).   5. Mildly enlarged left atrium.   6. Normal right atrial size.   7. Mild mitral valve regurgitation.   8. Mild thickening of the anterior and posterior mitral valve leaflets.   9. Mild tricuspid regurgitation.  10. Severe aortic stenosis, peak/mean pressure gradient 120/68 mmHg, SHILA   0.9 cm^2.    PHYSICIAN INTERPRETATION:  Left Ventricle: The left ventricular internal cavity size is normal. Left   ventricular wall thickness is mildly increased. Global LV systolic   function was normal. Normal segmental left ventricular systolic function.   Spectral Doppler shows pseudonormal pattern of left ventricular   myocardial filling (Grade II diastolic dysfunction).  Right Ventricle: Normal right ventricular size and function.  Left Atrium: Mildly enlarged left atrium.  Right Atrium: Normal right atrial size. RA Area is 15.01 cm² cm2.  Pericardium: There is no evidence of pericardial effusion.  Mitral Valve: Mild thickening of the anterior and posterior mitral valve   leaflets. Mild mitral valve regurgitation is seen.  Tricuspid Valve: The tricuspid valve is normal in structure. Mild   tricuspid regurgitation is visualized.  Aortic Valve: Trivial aortic valve regurgitation is seen. Severe aortic   stenosis, peak/mean pressure gradient 120/68 mmHg, SHILA 0.9 cm^2.  Pulmonic Valve: Structurally normal pulmonic valve, with normal leaflet   excursion. No indication of pulmonic valve regurgitation.  Aorta: The aortic root and ascending aorta are structurally normal, with   no evidence of dilitation.  Pulmonary Artery: The pulmonary artery is of normal size and origin.    < end of copied text >    -CCTA: NA  -STRESS TEST: NA  -CATHETERIZATION: NA  -OTHER:  EC Lead ECG:   Ventricular Rate 72 BPM    Atrial Rate 72 BPM    P-R Interval 134 ms    QRS Duration 112 ms    Q-T Interval 444 ms    QTC Calculation(Bazett) 486 ms    P Axis -48 degrees    R Axis -41 degrees    T Axis -74 degrees    Diagnosis Line Sinus rhythm  Left axis deviation  Incomplete right bundle branch block  Left ventricular hypertrophy ( R in aVL , [employer] product , Romhilt-Mcginnis )  Cannot rule out Septal infarct , age undetermined  ST & T wave abnormality, consider inferior ischemia  ST & T wave abnormality, consider anterolateral ischemia  Abnormal ECG    Confirmed by El Zhao ([id number]) on 2023 7:54:59 AM ( @ 16:32)      TELEMETRY EVENTS: NA   Outpt cardiologist: Dr Hernandez    HPI:  78-year-old female history of anemia 2/2 CKD and chronic upper GIB due to AV malformations and gastric body dieulafoy lesion, severe aortic stenosis pending TAVR, presenting to ER for evaluation.  Patient  has weekly blood transfusions with Dr. Gaston (last one 5 days ago).  is supposed to have blood work done tomorrow but cannot wait.   is now feeling very weak with associated shortness of breath and mild chest tightness.  Symptoms are similar to prior anemia requiring blood transfusion.  She denies any recent dark or tarry stool, bright red blood per rectum, abdominal pain, nausea, vomiting, fever, chills, syncope  Refused OLEKSANDR    In the ED: BP: 116/53. HR: 78, RR: 20, Temp; 97.9, SpO2: 100 on RA    Labs:  -> WBC: 2.4 baseline, H.3 (baseline ~8), MCV: 94.8, plt: 101  -> Na: 142, K: 4.6, BUN: 65, crea: 2.4 at baseline  -> Trop: 0.02, BNP: 2782    s/p Pantoprazole 80 IV once and 3 u PRBC ordered (06 Dec 2023 23:11)      ---  cardio fellow additional notes:    77 y/o F with OMHx of HTN, CKD IV, Severe AS, Hx of GIB s/p EGD/C scope/Enteroscopy GAVE, small bowel AVMs s/p phototherapy p/w symptomatic anemia. Cardio consulted for pre op.      PAST MEDICAL & SURGICAL HISTORY  HTN (hypertension)    Heart murmur    Eczema    Anemia  iron infusions and PRBC transfusions    Aortic stenosis    Chronic kidney disease (CKD)    2019 novel coronavirus disease (COVID-19)  2020- [facility] admission    Gastric AVM    H/O appendicitis    S/P cholecystectomy    History of esophagogastroduodenoscopy (EGD)    H/O colonoscopy    H/O  section    History of appendectomy    Port-A-Cath in place  right CW      FAMILY HISTORY:  FAMILY HISTORY:  FH: kidney disease (Father)    FH: breast cancer (Mother)    FH: multiple myeloma (Mother)      SOCIAL HISTORY:  Social History:    ALLERGIES:  EKG leads (Hives)  penicillins (Rash; Urticaria; Hives; Blisters)  latex (Unknown)  aspirin (Rash; Other)      MEDICATIONS:  diphenhydrAMINE 25 milliGRAM(s) Oral at bedtime  folic acid 1 milliGRAM(s) Oral daily  furosemide    Tablet 40 milliGRAM(s) Oral daily  metoprolol tartrate 25 milliGRAM(s) Oral two times a day  pantoprazole  Injectable 40 milliGRAM(s) IV Push two times a day  sodium bicarbonate 650 milliGRAM(s) Oral every 8 hours    HOME MEDICATIONS:  Home Medications:  diphenhydrAMINE 25 mg oral capsule: 1 orally once a day (at bedtime) (07 Dec 2023 00:14)  folic acid 1 mg oral tablet: 1 tab(s) orally once a day (07 Dec 2023 00:14)  furosemide 40 mg oral tablet: 1 tab(s) orally once a day (07 Dec 2023 00:14)  metoprolol tartrate 25 mg oral tablet: 1 tab(s) orally 2 times a day (07 Dec 2023 00:14)      VITALS:   T(F): 98.2 ( @ 12:03), Max: 98.2 ( @ 10:30)  HR: 60 ( @ 12:03) (60 - 85)  BP: 107/52 ( @ 12:03) (103/46 - 116/53)  BP(mean): 70 ( @ 10:30) (70 - 70)  RR: 18 ( @ 12:03) (18 - 20)  SpO2: 98% ( @ 10:30) (98% - 100%)    I&O's Summary      REVIEW OF SYSTEMS:  CONSTITUTIONAL: No weakness, fevers or chills  HEENT: No visual changes, neck/ear pain  RESPIRATORY: No cough   CARDIOVASCULAR: See HPI  GASTROINTESTINAL: see HPI  GENITOURINARY: No dysuria, frequency or hematuria  NEUROLOGICAL: No new focal deficits  SKIN: No new rashes    PHYSICAL EXAM:  General: Not in distress.     HEENT: EOMI  Cardio: regular, S1, S2, KATHYA 3/6 radiating to carotids  Pulm: B/L rales and basal crackles  Abdomen: Soft, non-tender, non-distended. Normoactive bowel sounds  Extremities: No edema b/l le  Neuro: A&O x3. No focal deficits    LABS:                        4.3    2.30  )-----------( 101      ( 06 Dec 2023 17:52 )             14.5     12-06    142  |  112<H>  |  65<HH>  ----------------------------<  93  4.6   |  19  |  2.4<H>    Ca    7.8<L>      06 Dec 2023 17:52    TPro  5.1<L>  /  Alb  3.2<L>  /  TBili  0.5  /  DBili  x   /  AST  12  /  ALT  <5  /  AlkPhos  87      PT/INR - ( 06 Dec 2023 17:52 )   PT: 12.00 sec;   INR: 1.05 ratio         PTT - ( 06 Dec 2023 17:52 )  PTT:49.4 sec  Troponin T, Serum: 0.02 ng/mL *H* (23 @ 17:52)    CARDIAC MARKERS ( 06 Dec 2023 17:52 )  x     / 0.02 ng/mL / x     / x     / x              RADIOLOGY:  -CXR: NA  -TTE: < from: TTE Echo Complete w/o Contrast w/ Doppler (23 @ 14:29) >  Summary:   1. Normal global left ventricular systolic function.   2. LV Ejection Fraction by Brown's Method with a biplane EF of 62 %.   3. Mildly increased LV wall thickness.   4. Spectral Doppler shows pseudonormal pattern of left ventricular   myocardial filling (Grade II diastolic dysfunction).   5. Mildly enlarged left atrium.   6. Normal right atrial size.   7. Mild mitral valve regurgitation.   8. Mild thickening of the anterior and posterior mitral valve leaflets.   9. Mild tricuspid regurgitation.  10. Severe aortic stenosis, peak/mean pressure gradient 120/68 mmHg, SHILA   0.9 cm^2.    PHYSICIAN INTERPRETATION:  Left Ventricle: The left ventricular internal cavity size is normal. Left   ventricular wall thickness is mildly increased. Global LV systolic   function was normal. Normal segmental left ventricular systolic function.   Spectral Doppler shows pseudonormal pattern of left ventricular   myocardial filling (Grade II diastolic dysfunction).  Right Ventricle: Normal right ventricular size and function.  Left Atrium: Mildly enlarged left atrium.  Right Atrium: Normal right atrial size. RA Area is 15.01 cm² cm2.  Pericardium: There is no evidence of pericardial effusion.  Mitral Valve: Mild thickening of the anterior and posterior mitral valve   leaflets. Mild mitral valve regurgitation is seen.  Tricuspid Valve: The tricuspid valve is normal in structure. Mild   tricuspid regurgitation is visualized.  Aortic Valve: Trivial aortic valve regurgitation is seen. Severe aortic   stenosis, peak/mean pressure gradient 120/68 mmHg, SHILA 0.9 cm^2.  Pulmonic Valve: Structurally normal pulmonic valve, with normal leaflet   excursion. No indication of pulmonic valve regurgitation.  Aorta: The aortic root and ascending aorta are structurally normal, with   no evidence of dilitation.  Pulmonary Artery: The pulmonary artery is of normal size and origin.    < end of copied text >    -CCTA: NA  -STRESS TEST: NA  -CATHETERIZATION: NA  -OTHER:  EC Lead ECG:   Ventricular Rate 72 BPM    Atrial Rate 72 BPM    P-R Interval 134 ms    QRS Duration 112 ms    Q-T Interval 444 ms    QTC Calculation(Bazett) 486 ms    P Axis -48 degrees    R Axis -41 degrees    T Axis -74 degrees    Diagnosis Line Sinus rhythm  Left axis deviation  Incomplete right bundle branch block  Left ventricular hypertrophy ( R in aVL , [employer] product , Romhilt-Mcginnis )  Cannot rule out Septal infarct , age undetermined  ST & T wave abnormality, consider inferior ischemia  ST & T wave abnormality, consider anterolateral ischemia  Abnormal ECG    Confirmed by El Zhao ([id number]) on 2023 7:54:59 AM ( @ 16:32)      TELEMETRY EVENTS: NA   Outpt cardiologist: Dr Hernandez    HPI:    78-year-old female history of anemia 2/2 CKD and chronic upper GIB due to AV malformations and gastric body dieulafoy lesion, severe aortic stenosis pending TAVR, presenting to ER for evaluation.  Patient  has weekly blood transfusions with Dr. Gaston (last one 5 days ago).  is supposed to have blood work done tomorrow but cannot wait.   is now feeling very weak with associated shortness of breath and mild chest tightness.  Symptoms are similar to prior anemia requiring blood transfusion.  She denies any recent dark or tarry stool, bright red blood per rectum, abdominal pain, nausea, vomiting, fever, chills, syncope  Refused OLEKSANDR    In the ED: BP: 116/53. HR: 78, RR: 20, Temp; 97.9, SpO2: 100 on RA    Labs:  -> WBC: 2.4 baseline, H.3 (baseline ~8), MCV: 94.8, plt: 101  -> Na: 142, K: 4.6, BUN: 65, crea: 2.4 at baseline  -> Trop: 0.02, BNP: 2782    s/p Pantoprazole 80 IV once and 3 u PRBC ordered (06 Dec 2023 23:11)      ---  cardio fellow additional notes:    77 y/o F with OMHx of HTN, CKD IV, Severe AS, Hx of GIB s/p EGD/C scope/Enteroscopy GAVE, small bowel AVMs s/p phototherapy p/w symptomatic anemia. Cardio consulted for pre op.      PAST MEDICAL & SURGICAL HISTORY  HTN (hypertension)    Heart murmur    Eczema    Anemia  iron infusions and PRBC transfusions    Aortic stenosis    Chronic kidney disease (CKD)    2019 novel coronavirus disease (COVID-19)  2020- [facility] admission    Gastric AVM    H/O appendicitis    S/P cholecystectomy    History of esophagogastroduodenoscopy (EGD)    H/O colonoscopy    H/O  section    History of appendectomy    Port-A-Cath in place  right CW      FAMILY HISTORY:  FAMILY HISTORY:  FH: kidney disease (Father)    FH: breast cancer (Mother)    FH: multiple myeloma (Mother)      SOCIAL HISTORY:  Social History:    ALLERGIES:  EKG leads (Hives)  penicillins (Rash; Urticaria; Hives; Blisters)  latex (Unknown)  aspirin (Rash; Other)      MEDICATIONS:  diphenhydrAMINE 25 milliGRAM(s) Oral at bedtime  folic acid 1 milliGRAM(s) Oral daily  furosemide    Tablet 40 milliGRAM(s) Oral daily  metoprolol tartrate 25 milliGRAM(s) Oral two times a day  pantoprazole  Injectable 40 milliGRAM(s) IV Push two times a day  sodium bicarbonate 650 milliGRAM(s) Oral every 8 hours    HOME MEDICATIONS:  Home Medications:  diphenhydrAMINE 25 mg oral capsule: 1 orally once a day (at bedtime) (07 Dec 2023 00:14)  folic acid 1 mg oral tablet: 1 tab(s) orally once a day (07 Dec 2023 00:14)  furosemide 40 mg oral tablet: 1 tab(s) orally once a day (07 Dec 2023 00:14)  metoprolol tartrate 25 mg oral tablet: 1 tab(s) orally 2 times a day (07 Dec 2023 00:14)      VITALS:   T(F): 98.2 ( @ 12:03), Max: 98.2 ( @ 10:30)  HR: 60 ( @ 12:03) (60 - 85)  BP: 107/52 ( @ 12:03) (103/46 - 116/53)  BP(mean): 70 ( @ 10:30) (70 - 70)  RR: 18 ( @ 12:03) (18 - 20)  SpO2: 98% ( @ 10:30) (98% - 100%)    I&O's Summary      REVIEW OF SYSTEMS:  CONSTITUTIONAL: No weakness, fevers or chills  HEENT: No visual changes, neck/ear pain  RESPIRATORY: No cough   CARDIOVASCULAR: See HPI  GASTROINTESTINAL: see HPI  GENITOURINARY: No dysuria, frequency or hematuria  NEUROLOGICAL: No new focal deficits  SKIN: No new rashes    PHYSICAL EXAM:  General: Not in distress.     HEENT: EOMI  Cardio: regular, S1, S2, KATHYA 3/6 radiating to carotids  Pulm: B/L rales and basal crackles  Abdomen: Soft, non-tender, non-distended. Normoactive bowel sounds  Extremities: No edema b/l le  Neuro: A&O x3. No focal deficits    LABS:                        4.3    2.30  )-----------( 101      ( 06 Dec 2023 17:52 )             14.5     12-06    142  |  112<H>  |  65<HH>  ----------------------------<  93  4.6   |  19  |  2.4<H>    Ca    7.8<L>      06 Dec 2023 17:52    TPro  5.1<L>  /  Alb  3.2<L>  /  TBili  0.5  /  DBili  x   /  AST  12  /  ALT  <5  /  AlkPhos  87      PT/INR - ( 06 Dec 2023 17:52 )   PT: 12.00 sec;   INR: 1.05 ratio         PTT - ( 06 Dec 2023 17:52 )  PTT:49.4 sec  Troponin T, Serum: 0.02 ng/mL *H* (23 @ 17:52)    CARDIAC MARKERS ( 06 Dec 2023 17:52 )  x     / 0.02 ng/mL / x     / x     / x              RADIOLOGY:  -CXR: NA  -TTE: < from: TTE Echo Complete w/o Contrast w/ Doppler (23 @ 14:29) >  Summary:   1. Normal global left ventricular systolic function.   2. LV Ejection Fraction by Brown's Method with a biplane EF of 62 %.   3. Mildly increased LV wall thickness.   4. Spectral Doppler shows pseudonormal pattern of left ventricular   myocardial filling (Grade II diastolic dysfunction).   5. Mildly enlarged left atrium.   6. Normal right atrial size.   7. Mild mitral valve regurgitation.   8. Mild thickening of the anterior and posterior mitral valve leaflets.   9. Mild tricuspid regurgitation.  10. Severe aortic stenosis, peak/mean pressure gradient 120/68 mmHg, SHILA   0.9 cm^2.    PHYSICIAN INTERPRETATION:  Left Ventricle: The left ventricular internal cavity size is normal. Left   ventricular wall thickness is mildly increased. Global LV systolic   function was normal. Normal segmental left ventricular systolic function.   Spectral Doppler shows pseudonormal pattern of left ventricular   myocardial filling (Grade II diastolic dysfunction).  Right Ventricle: Normal right ventricular size and function.  Left Atrium: Mildly enlarged left atrium.  Right Atrium: Normal right atrial size. RA Area is 15.01 cm² cm2.  Pericardium: There is no evidence of pericardial effusion.  Mitral Valve: Mild thickening of the anterior and posterior mitral valve   leaflets. Mild mitral valve regurgitation is seen.  Tricuspid Valve: The tricuspid valve is normal in structure. Mild   tricuspid regurgitation is visualized.  Aortic Valve: Trivial aortic valve regurgitation is seen. Severe aortic   stenosis, peak/mean pressure gradient 120/68 mmHg, SHILA 0.9 cm^2.  Pulmonic Valve: Structurally normal pulmonic valve, with normal leaflet   excursion. No indication of pulmonic valve regurgitation.  Aorta: The aortic root and ascending aorta are structurally normal, with   no evidence of dilitation.  Pulmonary Artery: The pulmonary artery is of normal size and origin.    < end of copied text >    -CCTA: NA  -STRESS TEST: NA  -CATHETERIZATION: NA  -OTHER:  EC Lead ECG:   Ventricular Rate 72 BPM    Atrial Rate 72 BPM    P-R Interval 134 ms    QRS Duration 112 ms    Q-T Interval 444 ms    QTC Calculation(Bazett) 486 ms    P Axis -48 degrees    R Axis -41 degrees    T Axis -74 degrees    Diagnosis Line Sinus rhythm  Left axis deviation  Incomplete right bundle branch block  Left ventricular hypertrophy ( R in aVL , [employer] product , Romhilt-Mcginnis )  Cannot rule out Septal infarct , age undetermined  ST & T wave abnormality, consider inferior ischemia  ST & T wave abnormality, consider anterolateral ischemia  Abnormal ECG    Confirmed by El Zhao ([id number]) on 2023 7:54:59 AM ( @ 16:32)      TELEMETRY EVENTS: NA   Outpt cardiologist: Dr Hernandez    HPI:    78-year-old female history of anemia 2/2 CKD and chronic upper GIB due to AV malformations and gastric body dieulafoy lesion, severe aortic stenosis pending TAVR, presenting to ER for evaluation.  Patient  has weekly blood transfusions with Dr. Gaston (last one 5 days ago).  is supposed to have blood work done tomorrow but cannot wait.   is now feeling very weak with associated shortness of breath and mild chest tightness.  Symptoms are similar to prior anemia requiring blood transfusion.  She denies any recent dark or tarry stool, bright red blood per rectum, abdominal pain, nausea, vomiting, fever, chills, syncope  Refused OLEKSANDR    In the ED: BP: 116/53. HR: 78, RR: 20, Temp; 97.9, SpO2: 100 on RA    Labs:  -> WBC: 2.4 baseline, H.3 (baseline ~8), MCV: 94.8, plt: 101  -> Na: 142, K: 4.6, BUN: 65, crea: 2.4 at baseline  -> Trop: 0.02, BNP: 2782    s/p Pantoprazole 80 IV once and 3 u PRBC ordered (06 Dec 2023 23:11)      ---  cardio fellow additional notes:    79 y/o F with OMHx of HTN, CKD IV, Severe AS, Hx of GIB s/p EGD/C scope/Enteroscopy GAVE, small bowel AVMs s/p phototherapy p/w symptomatic anemia. Cardio consulted for pre op.      PAST MEDICAL & SURGICAL HISTORY  HTN (hypertension)    Heart murmur    Eczema    Anemia  iron infusions and PRBC transfusions    Aortic stenosis    Chronic kidney disease (CKD)    2019 novel coronavirus disease (COVID-19)  2020- [facility] admission    Gastric AVM    H/O appendicitis    S/P cholecystectomy    History of esophagogastroduodenoscopy (EGD)    H/O colonoscopy    H/O  section    History of appendectomy    Port-A-Cath in place  right CW      FAMILY HISTORY:  FAMILY HISTORY:  FH: kidney disease (Father)    FH: breast cancer (Mother)    FH: multiple myeloma (Mother)      SOCIAL HISTORY:  Social History:    ALLERGIES:  EKG leads (Hives)  penicillins (Rash; Urticaria; Hives; Blisters)  latex (Unknown)  aspirin (Rash; Other)      MEDICATIONS:  diphenhydrAMINE 25 milliGRAM(s) Oral at bedtime  folic acid 1 milliGRAM(s) Oral daily  furosemide    Tablet 40 milliGRAM(s) Oral daily  metoprolol tartrate 25 milliGRAM(s) Oral two times a day  pantoprazole  Injectable 40 milliGRAM(s) IV Push two times a day  sodium bicarbonate 650 milliGRAM(s) Oral every 8 hours    HOME MEDICATIONS:  Home Medications:  diphenhydrAMINE 25 mg oral capsule: 1 orally once a day (at bedtime) (07 Dec 2023 00:14)  folic acid 1 mg oral tablet: 1 tab(s) orally once a day (07 Dec 2023 00:14)  furosemide 40 mg oral tablet: 1 tab(s) orally once a day (07 Dec 2023 00:14)  metoprolol tartrate 25 mg oral tablet: 1 tab(s) orally 2 times a day (07 Dec 2023 00:14)      VITALS:   T(F): 98.2 ( @ 12:03), Max: 98.2 ( @ 10:30)  HR: 60 ( @ 12:03) (60 - 85)  BP: 107/52 ( @ 12:03) (103/46 - 116/53)  BP(mean): 70 ( @ 10:30) (70 - 70)  RR: 18 ( @ 12:03) (18 - 20)  SpO2: 98% ( @ 10:30) (98% - 100%)    I&O's Summary      REVIEW OF SYSTEMS:  CONSTITUTIONAL: No weakness, fevers or chills  HEENT: No visual changes, neck/ear pain  RESPIRATORY: No cough   CARDIOVASCULAR: See HPI  GASTROINTESTINAL: see HPI  GENITOURINARY: No dysuria, frequency or hematuria  NEUROLOGICAL: No new focal deficits  SKIN: No new rashes    PHYSICAL EXAM:  General: Not in distress.     HEENT: EOMI  Cardio: regular, S1, S2, KATHYA 3/6 radiating to carotids  Pulm: B/L rales and basal crackles  Abdomen: Soft, non-tender, non-distended. Normoactive bowel sounds  Extremities: No edema b/l le  Neuro: A&O x3. No focal deficits    LABS:                        4.3    2.30  )-----------( 101      ( 06 Dec 2023 17:52 )             14.5     12-06    142  |  112<H>  |  65<HH>  ----------------------------<  93  4.6   |  19  |  2.4<H>    Ca    7.8<L>      06 Dec 2023 17:52    TPro  5.1<L>  /  Alb  3.2<L>  /  TBili  0.5  /  DBili  x   /  AST  12  /  ALT  <5  /  AlkPhos  87      PT/INR - ( 06 Dec 2023 17:52 )   PT: 12.00 sec;   INR: 1.05 ratio         PTT - ( 06 Dec 2023 17:52 )  PTT:49.4 sec  Troponin T, Serum: 0.02 ng/mL *H* (23 @ 17:52)    CARDIAC MARKERS ( 06 Dec 2023 17:52 )  x     / 0.02 ng/mL / x     / x     / x              RADIOLOGY:  -CXR: NA  -TTE: < from: TTE Echo Complete w/o Contrast w/ Doppler (23 @ 14:29) >  Summary:   1. Normal global left ventricular systolic function.   2. LV Ejection Fraction by Brown's Method with a biplane EF of 62 %.   3. Mildly increased LV wall thickness.   4. Spectral Doppler shows pseudonormal pattern of left ventricular   myocardial filling (Grade II diastolic dysfunction).   5. Mildly enlarged left atrium.   6. Normal right atrial size.   7. Mild mitral valve regurgitation.   8. Mild thickening of the anterior and posterior mitral valve leaflets.   9. Mild tricuspid regurgitation.  10. Severe aortic stenosis, peak/mean pressure gradient 120/68 mmHg, SHILA   0.9 cm^2.    PHYSICIAN INTERPRETATION:  Left Ventricle: The left ventricular internal cavity size is normal. Left   ventricular wall thickness is mildly increased. Global LV systolic   function was normal. Normal segmental left ventricular systolic function.   Spectral Doppler shows pseudonormal pattern of left ventricular   myocardial filling (Grade II diastolic dysfunction).  Right Ventricle: Normal right ventricular size and function.  Left Atrium: Mildly enlarged left atrium.  Right Atrium: Normal right atrial size. RA Area is 15.01 cm² cm2.  Pericardium: There is no evidence of pericardial effusion.  Mitral Valve: Mild thickening of the anterior and posterior mitral valve   leaflets. Mild mitral valve regurgitation is seen.  Tricuspid Valve: The tricuspid valve is normal in structure. Mild   tricuspid regurgitation is visualized.  Aortic Valve: Trivial aortic valve regurgitation is seen. Severe aortic   stenosis, peak/mean pressure gradient 120/68 mmHg, SHILA 0.9 cm^2.  Pulmonic Valve: Structurally normal pulmonic valve, with normal leaflet   excursion. No indication of pulmonic valve regurgitation.  Aorta: The aortic root and ascending aorta are structurally normal, with   no evidence of dilitation.  Pulmonary Artery: The pulmonary artery is of normal size and origin.    < end of copied text >    -CCTA: NA  -STRESS TEST: NA  -CATHETERIZATION: NA  -OTHER:  EC Lead ECG:   Ventricular Rate 72 BPM    Atrial Rate 72 BPM    P-R Interval 134 ms    QRS Duration 112 ms    Q-T Interval 444 ms    QTC Calculation(Bazett) 486 ms    P Axis -48 degrees    R Axis -41 degrees    T Axis -74 degrees    Diagnosis Line Sinus rhythm  Left axis deviation  Incomplete right bundle branch block  Left ventricular hypertrophy ( R in aVL , Adan product , Romhilt-Mcginnis )  Cannot rule out Septal infarct , age undetermined  ST & T wave abnormality, consider inferior ischemia  ST & T wave abnormality, consider anterolateral ischemia  Abnormal ECG    Confirmed by El Zhao ([id number]) on 2023 7:54:59 AM ( @ 16:32)      TELEMETRY EVENTS: NA

## 2023-12-07 NOTE — PATIENT PROFILE ADULT - FLU SEASON?
Yes... No Residual Tumor Seen Histology Text: There were no malignant cells seen in the sections examined.

## 2023-12-07 NOTE — CONSULT NOTE ADULT - ASSESSMENT
* SUMMARY:  77 y/o F with OMHx of HTN, CKD IV, Severe AS, Hx of GIB s/p EGD/C scope/Enteroscopy GAVE, small bowel AVMs s/p phototherapy p/w symptomatic anemia. Cardio consulted for pre op.      * Patient-based characteristics (Functional capacity)  Patient is able to achieve more than 4 METS (walk 4 blocks, climb 2 flights of stairs, etc...)          Y [] / N [x]    High-risk patient features:  - Recent (<30 days) or active MI          Y [] / N [X]  - Unstable or severe angina          Y [] / N [X]  - Decompensated heart failure, or worsening or new-onset heart failure          Y [] / N [X]  - Severe valvular disease          Y [x] / N []  - Significant arrhythmia (Tachy- or Bradyarrhythmia)          Y [] / N [X]    * Surgery/Procedure-based characteristics (Type of surgery)  - Low-risk procedure (outpatient procedure, elective, endoscopy, etc...)          Y [x] / N []  - Elevated or Moderate-risk procedure (Inpatient)          Y [] / N []  - High-risk procedure (urgent/emergent procedure, Intrathoracic, vascular, etc...)          Y [] / N []    * Revised Cardiac Risk Index (RCRI)  1- History of ischemic heart disease          Y [] / N [X]  2- History of congestive heart failure          Y [x] / N []  3- History of stroke/TIA          Y [] / N [X]  4- History of insulin-dependent diabetes          Y [] / N [X]  5- Chronic kidney disease (Cr >2mg/dL)          Y [x] / N []  6- Undergoing suprainguinal vascular, intraperitoneal, or intrathoracic surgery          Y [] / N [X]    Class I risk (Zero factors) --> 3.9% (30-day risk of death, MI, or cardiac arrest)  Class II risk (One factor) --> 6% risk (30-day risk of death, MI, or cardiac arrest)  Class III risk (Two factors) --> 10.1% risk (30-day risk of death, MI, or cardiac arrest)  Class IV risk (Three factors or more) --> >15% risk (30-day risk of death, MI, or cardiac arrest)    * IMPRESSION & RECOMMENDATIONS:  IV Lasix 40mg stat  c/w PO lasix 40mg qd  c/w lopressor 25 BID  check EKG after PRBCs transfusion. Keep target Hb>8  pt is at high risk of MACE for low risk procedure given severe AS and poor functional status  avoid rapid fluid shifts, monitor pre-load, use Sukhjinder prn  outpatient f/u with Dr Hernandez  No further cardiac work-up is needed at the moment. There are no current cardiac contraindications to prevent from proceeding with the scheduled surgery/procedure.    - This consult serves only as a estuardo-operative cardiac risk stratification and evaluation to predict 30-days cardiac complications risk and mortality. The decision to proceed with the surgery/procedure is made by the performing physician and the patient -

## 2023-12-07 NOTE — CHART NOTE - NSCHARTNOTEFT_GEN_A_CORE
Esophagus: Normal    Stomach: Two small non-bleeding localized angioectasias were seen in the body. The angioectasia(s) was ablated completely. Hemostasis was performed. The previously placed endoclip in the stomach was seen. 	Erosions of the mucosa was noted in the stomach. These findings are compatible with erosive gastritis.    Duodenum: Two small non-bleeding localized angioectasias were seen in the fourth portion of the duodenum. The angioectasia(s) was ablated completely. Hemostasis was performed.      Plan    Clear liquid diet   Monitor for signs of bleeding   Target Hb >8    Patient will eventually need repair of severe aortic stenosis as recurrence risk of AVMs are high in these patients   Consider Octreotide LAR 10mg/month Depot shots given multiple admissions for recurrent AVMs and need for multiple endoscopic intervention s   Please obtain CT Angiogram

## 2023-12-07 NOTE — CONSULT NOTE ADULT - ATTENDING COMMENTS
* SUMMARY:  79 y/o F with OMHx of HTN, CKD IV, Severe AS, Hx of GIB s/p EGD/C scope/Enteroscopy GAVE, small bowel AVMs s/p phototherapy p/w symptomatic anemia. Cardio consulted for pre op.      * Patient-based characteristics (Functional capacity)  Patient is able to achieve more than 4 METS (walk 4 blocks, climb 2 flights of stairs, etc...)          Y [] / N [x]    High-risk patient features:  - Recent (<30 days) or active MI          Y [] / N [X]  - Unstable or severe angina          Y [] / N [X]  - Decompensated heart failure, or worsening or new-onset heart failure          Y [] / N [X]  - Severe valvular disease          Y [x] / N []  - Significant arrhythmia (Tachy- or Bradyarrhythmia)          Y [] / N [X]    * Surgery/Procedure-based characteristics (Type of surgery)  - Low-risk procedure (outpatient procedure, elective, endoscopy, etc...)          Y [x] / N []  - Elevated or Moderate-risk procedure (Inpatient)          Y [] / N []  - High-risk procedure (urgent/emergent procedure, Intrathoracic, vascular, etc...)          Y [] / N []    * Revised Cardiac Risk Index (RCRI)  1- History of ischemic heart disease          Y [] / N [X]  2- History of congestive heart failure          Y [x] / N []  3- History of stroke/TIA          Y [] / N [X]  4- History of insulin-dependent diabetes          Y [] / N [X]  5- Chronic kidney disease (Cr >2mg/dL)          Y [x] / N []  6- Undergoing suprainguinal vascular, intraperitoneal, or intrathoracic surgery          Y [] / N [X]    Class I risk (Zero factors) --> 3.9% (30-day risk of death, MI, or cardiac arrest)  Class II risk (One factor) --> 6% risk (30-day risk of death, MI, or cardiac arrest)  Class III risk (Two factors) --> 10.1% risk (30-day risk of death, MI, or cardiac arrest)  Class IV risk (Three factors or more) --> >15% risk (30-day risk of death, MI, or cardiac arrest)    * IMPRESSION & RECOMMENDATIONS:  IV Lasix 40mg stat  c/w PO lasix 40mg qd  c/w lopressor 25 BID  check EKG after PRBCs transfusion. Keep target Hb>8  pt is at intermediate to high risk of MACE for low risk procedure given severe AS and poor functional status  avoid rapid fluid shifts, monitor pre-load, use Sukhjinder prn  outpatient f/u with Dr Hernandez  No further cardiac work-up is needed at the moment. There are no current cardiac contraindications to prevent from proceeding with the scheduled surgery/procedure.    - This consult serves only as a estuardo-operative cardiac risk stratification and evaluation to predict 30-days cardiac complications risk and mortality. The decision to proceed with the surgery/procedure is made by the performing physician and the patient -

## 2023-12-07 NOTE — ED ADULT NURSE REASSESSMENT NOTE - NS ED NURSE REASSESS COMMENT FT1
Pt. received from previous RN. Pt. lying on stretcher, breathing with ease on 3L via nasal cannula. A&O x4. Port noted on R chest wall. Awaiting clean bed assignment.

## 2023-12-07 NOTE — CONSULT NOTE ADULT - ASSESSMENT
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 We are consulted for concern of anemia in the setting of history of bleeding AVMs.      Pancytopenia: Likely Secondary to COVID Versus Suspected MDS (no BM Bx)  Acute on Chronic Anemia   No Overt GI Bleeding  History of Bleeding AVMs and Dieulafoy Lesions Status Post Hemostasis  Acute Drop in Hemoglobin  * Evaluated for anemia in past; pancytopenia thought to be secondary to COVID per heme onc; refused BM bx for MDS evaluation  * Had EGD 08/23/2023 by Dr Fisher: 6mm antral polyp; 1 AVM in fundus s/p APC and clip; Dieulafoy lesion in anterior bulb s/p clip; AVM in D bulb s/p clip  * Had colonoscopy 2019: moderate diverticulosis and internal hemorrhoids; solid stool so recommended repeat  * Had VCE in 02/2022 followed by enteroscopy. Last enteroscopy 04/03/2023: oozing from Dieulafoy lesions at D3 s/p clips  * Baseline hemoglobin (prior to admission): 6.9-8.8 in 2022  * ED Vitals:  /53 mmHg, HR 78 bpm   * Hemoglobin on admission: Hb 4.3 s/p 3 units of pRBCs -> Hb 7.4 then s/p 1 unit -> no repeat yet  * Coags: INR 1.05 on 12/06  * Not on AP or AC  * Refused OLEKSANDR in ED  * No current CT scan  * Family History: breast cancer in mother; no GI malignancies    RECOMMENDATIONS  - Will schedule patient for EGD + push enteroscopy today  - Please keep NPO after midnight  - Please transfuse 1 more unit since Hb 7.4. Target Hb >8 for EGD  - Trend CBC closely BID and transfuse as needed (target Hb  >7 after EGD). Maintain active Type and screen  - Trend BUN  - Ensure placement of x2 large 18G IV Bores  - Continue Pantoprazole 40 IV BID  - Monitor BM: no melena or hematochezia   - Please avoid any NSAIDs  - Hematology follow up: follows with Dr Gaston as outpatient  - If any unstable bleed, please call GI stat      Thank you for your consult.  - Please note that plan was communicated with medical team.   - Please reach GI on [phone] during weekdays till 5pm.  - Please call the GI service line after 5pm on Weekdays and anytime on Weekends: [phone].      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 We are consulted for concern of anemia in the setting of history of bleeding AVMs.      Pancytopenia: Likely Secondary to COVID Versus Suspected MDS (no BM Bx)  Acute on Chronic Anemia   No Overt GI Bleeding  History of Bleeding AVMs and Dieulafoy Lesions Status Post Hemostasis  Acute Drop in Hemoglobin  * Evaluated for anemia in past; pancytopenia thought to be secondary to COVID per heme onc; refused BM bx for MDS evaluation  * Had EGD 08/23/2023 by Dr Fisher: 6mm antral polyp; 1 AVM in fundus s/p APC and clip; Dieulafoy lesion in anterior bulb s/p clip; AVM in D bulb s/p clip  * Had colonoscopy 2019: moderate diverticulosis and internal hemorrhoids; solid stool so recommended repeat  * Had VCE in 02/2022 followed by enteroscopy. Last enteroscopy 04/03/2023: oozing from Dieulafoy lesions at D3 s/p clips  * Baseline hemoglobin (prior to admission): 6.9-8.8 in 2022  * ED Vitals:  /53 mmHg, HR 78 bpm   * Hemoglobin on admission: Hb 4.3 s/p 3 units of pRBCs -> Hb 7.4 then s/p 1 unit -> no repeat yet  * Coags: INR 1.05 on 12/06  * Not on AP or AC  * Refused OLEKSANDR in ED  * No current CT scan  * Family History: breast cancer in mother; no GI malignancies    RECOMMENDATIONS  - Will schedule patient for EGD + push enteroscopy today  - Please keep NPO after midnight  - Please transfuse 1 more unit since Hb 7.4. Target Hb >8 for EGD  - Trend CBC closely BID and transfuse as needed (target Hb  >7 after EGD). Maintain active Type and screen  - Trend BUN  - Ensure placement of x2 large 18G IV Bores  - Continue Pantoprazole 40 IV BID  - Monitor BM: no melena or hematochezia   - Please avoid any NSAIDs  - Hematology follow up: follows with Dr Gaston as outpatient  - If any unstable bleed, please call GI stat      Thank you for your consult.  - Please note that plan was communicated with medical team.   - Please reach GI on [phone] during weekdays till 5pm.  - Please call the GI service line after 5pm on Weekdays and anytime on Weekends: [phone].      Constanza Donato MD  PGY - 4 Gastroenterology Fellow   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 We are consulted for concern of anemia in the setting of history of bleeding AVMs.      Pancytopenia: Likely Secondary to COVID Versus Suspected MDS (no BM Bx)  Acute on Chronic Anemia   No Overt GI Bleeding  History of Bleeding AVMs and Dieulafoy Lesions Status Post Hemostasis  History of Perisplenic Varices: Rule Out Noncirrhotic Portal Hypertension  Acute Drop in Hemoglobin  * Evaluated for anemia in past; pancytopenia thought to be secondary to COVID per heme onc; refused BM bx for MDS evaluation  * Had EGD 08/23/2023 by Dr Fisher: 6mm antral polyp; 1 AVM in fundus s/p APC and clip; Dieulafoy lesion in anterior bulb s/p clip; AVM in D bulb s/p clip  * Had colonoscopy 2019: moderate diverticulosis and internal hemorrhoids; solid stool so recommended repeat  * Had VCE in 02/2022 followed by enteroscopy. Last enteroscopy 04/03/2023: oozing from Dieulafoy lesions at D3 s/p clips  * Baseline hemoglobin (prior to admission): 6.9-8.8 in 2022  * ED Vitals:  /53 mmHg, HR 78 bpm   * Hemoglobin on admission: Hb 4.3 s/p 3 units of pRBCs -> Hb 7.4 then s/p 1 unit -> no repeat yet  * Coags: INR 1.05 on 12/06  * Not on AP or AC  * Refused OLEKSANDR in ED  * No current CT scan  * Family History: breast cancer in mother; no GI malignancies    RECOMMENDATIONS  - Recommend abdominal imaging in setting of history of perisplenic varices   - Will schedule patient for EGD + push enteroscopy today  - Please keep NPO after midnight  - Please transfuse 1 more unit since Hb 7.4. Target Hb >8 for EGD  - Trend CBC closely BID and transfuse as needed (target Hb  >7 after EGD). Maintain active Type and screen  - Trend BUN  - Ensure placement of x2 large 18G IV Bores  - Continue Pantoprazole 40 IV BID  - Monitor BM: no melena or hematochezia   - Please avoid any NSAIDs  - Hematology follow up: follows with Dr Gaston as outpatient  - If any unstable bleed, please call GI stat      Thank you for your consult.  - Please note that plan was communicated with medical team.   - Please reach GI on [phone] during weekdays till 5pm.  - Please call the GI service line after 5pm on Weekdays and anytime on Weekends: [phone].      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 We are consulted for concern of anemia in the setting of history of bleeding AVMs.      Pancytopenia: Likely Secondary to COVID Versus Suspected MDS (no BM Bx)  Acute on Chronic Anemia   No Overt GI Bleeding  History of Bleeding AVMs and Dieulafoy Lesions Status Post Hemostasis  History of Perisplenic Varices: Rule Out Noncirrhotic Portal Hypertension  Acute Drop in Hemoglobin  * Evaluated for anemia in past; pancytopenia thought to be secondary to COVID per heme onc; refused BM bx for MDS evaluation  * Had EGD 08/23/2023 by Dr Fisher: 6mm antral polyp; 1 AVM in fundus s/p APC and clip; Dieulafoy lesion in anterior bulb s/p clip; AVM in D bulb s/p clip  * Had colonoscopy 2019: moderate diverticulosis and internal hemorrhoids; solid stool so recommended repeat  * Had VCE in 02/2022 followed by enteroscopy. Last enteroscopy 04/03/2023: oozing from Dieulafoy lesions at D3 s/p clips  * Baseline hemoglobin (prior to admission): 6.9-8.8 in 2022  * ED Vitals:  /53 mmHg, HR 78 bpm   * Hemoglobin on admission: Hb 4.3 s/p 3 units of pRBCs -> Hb 7.4 then s/p 1 unit -> no repeat yet  * Coags: INR 1.05 on 12/06  * Not on AP or AC  * Refused OLEKSANDR in ED  * No current CT scan  * Family History: breast cancer in mother; no GI malignancies    RECOMMENDATIONS  - Recommend abdominal imaging in setting of history of perisplenic varices   - Will schedule patient for EGD + push enteroscopy today  - Please keep NPO after midnight  - Please transfuse 1 more unit since Hb 7.4. Target Hb >8 for EGD  - Trend CBC closely BID and transfuse as needed (target Hb  >7 after EGD). Maintain active Type and screen  - Trend BUN  - Ensure placement of x2 large 18G IV Bores  - Continue Pantoprazole 40 IV BID  - Monitor BM: no melena or hematochezia   - Please avoid any NSAIDs  - Hematology follow up: follows with Dr Gaston as outpatient  - If any unstable bleed, please call GI stat      Thank you for your consult.  - Please note that plan was communicated with medical team.   - Please reach GI on [phone] during weekdays till 5pm.  - Please call the GI service line after 5pm on Weekdays and anytime on Weekends: [phone].      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 We are consulted for concern of anemia in the setting of history of bleeding AVMs.      Pancytopenia: Likely Secondary to COVID Versus Suspected MDS (no BM Bx)  Acute on Chronic Anemia   No Overt GI Bleeding  History of Bleeding AVMs and Dieulafoy Lesions Status Post Hemostasis  History of Perisplenic Varices: Rule Out Noncirrhotic Portal Hypertension  Acute Drop in Hemoglobin  * Evaluated for anemia in past; pancytopenia thought to be secondary to COVID per heme onc; refused BM bx for MDS evaluation  * Had EGD 08/23/2023 by Dr Fisher: 6mm antral polyp; 1 AVM in fundus s/p APC and clip; Dieulafoy lesion in anterior bulb s/p clip; AVM in D bulb s/p clip  * Had colonoscopy 2019: moderate diverticulosis and internal hemorrhoids; solid stool so recommended repeat  * Had VCE in 02/2022 followed by enteroscopy. Last enteroscopy 04/03/2023: oozing from Dieulafoy lesions at D3 s/p clips  * Baseline hemoglobin (prior to admission): 6.9-8.8 in 2022  * ED Vitals:  /53 mmHg, HR 78 bpm   * Hemoglobin on admission: Hb 4.3 s/p 3 units of pRBCs -> Hb 7.4 then s/p 1 unit -> no repeat yet  * Coags: INR 1.05 on 12/06  * Not on AP or AC  * Refused OLEKSANDR in ED  * No current CT scan  * Family History: breast cancer in mother; no GI malignancies    RECOMMENDATIONS  - Recommend abdominal CT imaging in setting of history of perisplenic varices   - Will schedule patient for EGD + push enteroscopy today  - Please keep NPO after midnight  - Please transfuse 1 more unit since Hb 7.4. Target Hb >8 for EGD  - Trend CBC closely BID and transfuse as needed (target Hb  >7 after EGD). Maintain active Type and screen  - Trend BUN  - Ensure placement of x2 large 18G IV Bores  - Continue Pantoprazole 40 IV BID  - Monitor BM: no melena or hematochezia   - Please avoid any NSAIDs  - Hematology follow up: follows with Dr Gaston as outpatient  - If any unstable bleed, please call GI stat      Thank you for your consult.  - Please note that plan was communicated with medical team.   - Please reach GI on [phone] during weekdays till 5pm.  - Please call the GI service line after 5pm on Weekdays and anytime on Weekends: [phone].      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 We are consulted for concern of anemia in the setting of history of bleeding AVMs.      Pancytopenia: Likely Secondary to COVID Versus Suspected MDS (no BM Bx)  Acute on Chronic Anemia   No Overt GI Bleeding  History of Bleeding AVMs and Dieulafoy Lesions Status Post Hemostasis  History of Perisplenic Varices: Rule Out Noncirrhotic Portal Hypertension  Acute Drop in Hemoglobin  * Evaluated for anemia in past; pancytopenia thought to be secondary to COVID per heme onc; refused BM bx for MDS evaluation  * Had EGD 08/23/2023 by Dr Fisher: 6mm antral polyp; 1 AVM in fundus s/p APC and clip; Dieulafoy lesion in anterior bulb s/p clip; AVM in D bulb s/p clip  * Had colonoscopy 2019: moderate diverticulosis and internal hemorrhoids; solid stool so recommended repeat  * Had VCE in 02/2022 followed by enteroscopy. Last enteroscopy 04/03/2023: oozing from Dieulafoy lesions at D3 s/p clips  * Baseline hemoglobin (prior to admission): 6.9-8.8 in 2022  * ED Vitals:  /53 mmHg, HR 78 bpm   * Hemoglobin on admission: Hb 4.3 s/p 3 units of pRBCs -> Hb 7.4 then s/p 1 unit -> no repeat yet  * Coags: INR 1.05 on 12/06  * Not on AP or AC  * Refused OLEKSANDR in ED  * No current CT scan  * Family History: breast cancer in mother; no GI malignancies    RECOMMENDATIONS  - Recommend abdominal CT imaging in setting of history of perisplenic varices   - Will schedule patient for EGD + push enteroscopy today  - Please keep NPO after midnight  - Please transfuse 1 more unit since Hb 7.4. Target Hb >8 for EGD  - Trend CBC closely BID and transfuse as needed (target Hb  >7 after EGD). Maintain active Type and screen  - Trend BUN  - Ensure placement of x2 large 18G IV Bores  - Continue Pantoprazole 40 IV BID  - Monitor BM: no melena or hematochezia   - Please avoid any NSAIDs  - Hematology follow up: follows with Dr Gaston as outpatient  - If any unstable bleed, please call GI stat      Thank you for your consult.  - Please note that plan was communicated with medical team.   - Please reach GI on [phone] during weekdays till 5pm.  - Please call the GI service line after 5pm on Weekdays and anytime on Weekends: [phone].      Constanza Donato MD  PGY - 4 Gastroenterology Fellow   [facility]

## 2023-12-07 NOTE — PATIENT PROFILE ADULT - FUNCTIONAL ASSESSMENT - BASIC MOBILITY 6.
3-calculated by average/Not able to assess (calculate score using [facility] averaging method)  3-calculated by average/Not able to assess (calculate score using [facility] averaging method)

## 2023-12-07 NOTE — PATIENT PROFILE ADULT - FALL HARM RISK - RISK INTERVENTIONS
Assistance OOB with selected safe patient handling equipment/Assistance with ambulation/Communicate Fall Risk and Risk Factors to all staff, patient, and family/Discuss with provider need for PT consult/Monitor gait and stability/Provide patient with walking aids - walker, cane, crutches/Reinforce activity limits and safety measures with patient and family/Visual Cue: Yellow wristband/Bed in lowest position, wheels locked, appropriate side rails in place/Call bell, personal items and telephone in reach/Instruct patient to call for assistance before getting out of bed or chair/Non-slip footwear when patient is out of bed/[place] to call system/Physically safe environment - no spills, clutter or unnecessary equipment/Purposeful Proactive Rounding/Room/bathroom lighting operational, light cord in reach Assistance OOB with selected safe patient handling equipment/Assistance with ambulation/Communicate Fall Risk and Risk Factors to all staff, patient, and family/Discuss with provider need for PT consult/Monitor gait and stability/Provide patient with walking aids - walker, cane, crutches/Reinforce activity limits and safety measures with patient and family/Visual Cue: Yellow wristband/Bed in lowest position, wheels locked, appropriate side rails in place/Call bell, personal items and telephone in reach/Instruct patient to call for assistance before getting out of bed or chair/Non-slip footwear when patient is out of bed/[place] to call system/Physically safe environment - no spills, clutter or unnecessary equipment/Purposeful Proactive Rounding/Room/bathroom lighting operational, light cord in reach

## 2023-12-07 NOTE — CONSULT NOTE ADULT - ATTENDING COMMENTS
79yo female h/o severe AS being evaluated for TAVR, recurrent AVMs and dieulafoy lesion s/p multiple endoscopies presenting with symptomatic anemia with Hb 4. Intermittent dark stools reported. Recommend repeat CT imaging considering varices seen on prior imaging and r/o alternate source of anemia/hematoma, no known underlying liver disease/cirrhosis. Can also obtain doppler US in the interim for evaluation of possible noncirrhotic portal htn. Plan for EGD/push enteroscopy. 77yo female h/o severe AS being evaluated for TAVR, recurrent AVMs and dieulafoy lesion s/p multiple endoscopies presenting with symptomatic anemia with Hb 4. Intermittent dark stools reported. Recommend repeat CT imaging considering varices seen on prior imaging and r/o alternate source of anemia/hematoma, no known underlying liver disease/cirrhosis. Can also obtain doppler US in the interim for evaluation of possible noncirrhotic portal htn. Plan for EGD/push enteroscopy.

## 2023-12-07 NOTE — CONSULT NOTE ADULT - SUBJECTIVE AND OBJECTIVE BOX
Gastroenterology Initial Consult/ Follow Up Note    Location: [address] ([address])  Patient Name: LATRICIA ARREAGA  Age: 78y  Gender: Female      Chief Complaint  Patient is a 78y old Female who presents with a chief complaint of Anemia (07 Dec 2023 12:14)    Primary diagnosis of Anemia          Reason for Consult      History of Present Illness  HPI:  78-year-old female history of anemia 2/2 CKD and chronic upper GIB due to AV malformations and gastric body dieulafoy lesion, severe aortic stenosis pending TAVR, presenting to ER for evaluation.  Patient  has weekly blood transfusions with Dr. Gsaton (last one 5 days ago).  is supposed to have blood work done tomorrow but cannot wait.   is now feeling very weak with associated shortness of breath and mild chest tightness.  Symptoms are similar to prior anemia requiring blood transfusion.  She denies any recent dark or tarry stool, bright red blood per rectum, abdominal pain, nausea, vomiting, fever, chills, syncope  Refused OLEKSANDR    In the ED: BP: 116/53. HR: 78, RR: 20, Temp; 97.9, SpO2: 100 on RA    Labs:  -> WBC: 2.4 baseline, H.3 (baseline ~8), MCV: 94.8, plt: 101  -> Na: 142, K: 4.6, BUN: 65, crea: 2.4 at baseline  -> Trop: 0.02, BNP: 2782    s/p Pantoprazole 80 IV once and 3 u PRBC ordered (06 Dec 2023 23:11)        Progress Note  This morning patient was seen and examined at bedside.    Today is hospital day 1d.  Patient is doing fine. No acute events overnight.   Patient's appetite is adequate, and he/she is tolerating diet and denies nausea or vomiting.   Patient denies any abdominal pain, diarrhea, constipation, melena, hematochezia, or hematemesis.  Last bowel movement was soft and was on ...      Of note:  - Medication Use: Denies any use of NSAIDs, C/S, ASA, EtOh and Antithrombotics or herbal remedies like Gingko, Garlic, and Ginseng  - GI History: Denies any history of bleeds, dx of CLD of IBD, any malignancies, dx of PUD. Patient has not had any recent GI procedures including spincterotomy or polypectomy. Denies any vascular surgical intervention and hx of radiation therapy. Patient denies any fever, abdominal pain, weight loss, dysphagia.       Prior EGD:    Prior Colonoscopy:    Past Medical and Surgical History:  HTN (hypertension)    Heart murmur    Eczema    Anemia  iron infusions and PRBC transfusions    Aortic stenosis    Chronic kidney disease (CKD)    2019 novel coronavirus disease (COVID-19)  2020- [facility] admission    Gastric AVM    H/O appendicitis    S/P cholecystectomy    History of esophagogastroduodenoscopy (EGD)    H/O colonoscopy    H/O  section    History of appendectomy    Port-A-Cath in place  right CW        Home Medications:  Home Medications:  diphenhydrAMINE 25 mg oral capsule: 1 orally once a day (at bedtime) (07 Dec 2023 00:14)  folic acid 1 mg oral tablet: 1 tab(s) orally once a day (07 Dec 2023 00:14)  furosemide 40 mg oral tablet: 1 tab(s) orally once a day (07 Dec 2023 00:14)  metoprolol tartrate 25 mg oral tablet: 1 tab(s) orally 2 times a day (07 Dec 2023 00:14)      Social History:  Social History:    Tobacco:  Alcohol:  Drugs:    Allergies:  EKG leads (Hives)  penicillins (Rash; Urticaria; Hives; Blisters)  latex (Unknown)  aspirin (Rash; Other)      Family History:  FAMILY HISTORY:  FH: kidney disease (Father)  FH: breast cancer (Mother)  FH: multiple myeloma (Mother)      Vital Signs in the last 24 hours   Vitals Summary T(C): 36.6 (23 @ 18:15), Max: 36.8 (23 @ 10:30)  HR: 67 (23 @ 18:45) (60 - 85)  BP: 114/53 (23 @ 18:45) (103/46 - 115/57)  RR: 18 (23 @ 18:45) (18 - 20)  SpO2: 100% (23 @ 18:45) (96% - 100%)  Vent Data   Intake/ Output   23 @ 07:01  -  23 @ 19:36  --------------------------------------------------------  IN: 418 mL / OUT: 300 mL / NET: 118 mL      Physical Exam  * General Appearance: Alert, cooperative, interactive, oriented to time, place, and person, in no acute distress  * Lungs: Good bilateral air entry  * Heart: Regular Rate and Rhythm, systolic murmur along A area, no audible rub or gallop  * Abdomen: Symmetric, non-distended, soft, non-tender, bowel sounds active all four quadrants  * Rectal: refused in ED      Investigations   Laboratory Workup      - CBC:                        7.4    3.79  )-----------( 102      ( 07 Dec 2023 11:54 )             24.2       - Hgb Trend:  7.4  23 @ 11:54  4.3  23 @ 17:52          - Chemistry:      143  |  111<H>  |  63<HH>  ----------------------------<  89  5.1<H>   |  23  |  2.6<H>    Ca    8.1<L>      07 Dec 2023 11:54    TPro  5.5<L>  /  Alb  3.1<L>  /  TBili  1.3<H>  /  DBili  x   /  AST  11  /  ALT  <5  /  AlkPhos  89      Liver panel trend:  TBili 1.3   /   AST 11   /   ALT <5   /   AlkP 89   /   Tptn 5.5   /   Alb 3.1    /   DBili --        TBili 0.5   /   AST 12   /   ALT <5   /   AlkP 87   /   Tptn 5.1   /   Alb 3.2    /   DBili --            - Coagulation Studies:  PT/INR - ( 07 Dec 2023 11:54 )   PT: 11.50 sec;   INR: 1.01 ratio    PTT - ( 07 Dec 2023 11:54 )  PTT:32.9 sec      - Cardiac Markers:  CARDIAC MARKERS ( 07 Dec 2023 11:54 )  x     / 0.01 ng/mL / x     / x     / x      CARDIAC MARKERS ( 06 Dec 2023 17:52 )  x     / 0.02 ng/mL / x     / x     / x          Microbiological Workup  Urinalysis Basic - ( 07 Dec 2023 11:54 )    Color: x / Appearance: x / SG: x / pH: x  Gluc: 89 mg/dL / Ketone: x  / Bili: x / Urobili: x   Blood: x / Protein: x / Nitrite: x   Leuk Esterase: x / RBC: x / WBC x   Sq Epi: x / Non Sq Epi: x / Bacteria: x      Current Medications  Standing Medications  diphenhydrAMINE 25 milliGRAM(s) Oral at bedtime  folic acid 1 milliGRAM(s) Oral daily  furosemide    Tablet 40 milliGRAM(s) Oral daily  metoprolol tartrate 25 milliGRAM(s) Oral two times a day  pantoprazole  Injectable 40 milliGRAM(s) IV Push two times a day  sodium bicarbonate 650 milliGRAM(s) Oral every 8 hours    PRN Medications    Singles Doses Administered  (ADM OVERRIDE) 2 each &lt;see task&gt; GiveOnce  (ADM OVERRIDE) 1 each &lt;see task&gt; GiveOnce  (ADM OVERRIDE) 1 each &lt;see task&gt; GiveOnce  (ADM OVERRIDE) 1 each &lt;see task&gt; GiveOnce  (ADM OVERRIDE) 1 each &lt;see task&gt; GiveOnce  (ADM OVERRIDE) 1 each &lt;see task&gt; GiveOnce  (ADM OVERRIDE) 1 each &lt;see task&gt; GiveOnce  (ADM OVERRIDE) 3 each &lt;see task&gt; GiveOnce  (ADM OVERRIDE) 1 each &lt;see task&gt; GiveOnce  (ADM OVERRIDE) 1 each &lt;see task&gt; GiveOnce  furosemide   Injectable 40 milliGRAM(s) IV Push once  pantoprazole  Injectable 80 milliGRAM(s) IV Push Once       Gastroenterology Initial Consult/ Follow Up Note    Location: [address] ([address])  Patient Name: LATRICIA ARREAGA  Age: 78y  Gender: Female      Chief Complaint  Patient is a 78y old Female who presents with a chief complaint of Anemia (07 Dec 2023 12:14)    Primary diagnosis of Anemia          Reason for Consult      History of Present Illness  HPI:  78-year-old female history of anemia 2/2 CKD and chronic upper GIB due to AV malformations and gastric body dieulafoy lesion, severe aortic stenosis pending TAVR, presenting to ER for evaluation.  Patient  has weekly blood transfusions with Dr. Gaston (last one 5 days ago).  is supposed to have blood work done tomorrow but cannot wait.   is now feeling very weak with associated shortness of breath and mild chest tightness.  Symptoms are similar to prior anemia requiring blood transfusion.  She denies any recent dark or tarry stool, bright red blood per rectum, abdominal pain, nausea, vomiting, fever, chills, syncope  Refused OLEKSANDR    In the ED: BP: 116/53. HR: 78, RR: 20, Temp; 97.9, SpO2: 100 on RA    Labs:  -> WBC: 2.4 baseline, H.3 (baseline ~8), MCV: 94.8, plt: 101  -> Na: 142, K: 4.6, BUN: 65, crea: 2.4 at baseline  -> Trop: 0.02, BNP: 2782    s/p Pantoprazole 80 IV once and 3 u PRBC ordered (06 Dec 2023 23:11)        Progress Note  This morning patient was seen and examined at bedside.    Today is hospital day 1d.  Patient is doing fine. No acute events overnight.   Patient's appetite is adequate, and he/she is tolerating diet and denies nausea or vomiting.   Patient denies any abdominal pain, diarrhea, constipation, melena, hematochezia, or hematemesis.  Last bowel movement was soft and was on ...      Of note:  - Medication Use: Denies any use of NSAIDs, C/S, ASA, EtOh and Antithrombotics or herbal remedies like Gingko, Garlic, and Ginseng  - GI History: Denies any history of bleeds, dx of CLD of IBD, any malignancies, dx of PUD. Patient has not had any recent GI procedures including spincterotomy or polypectomy. Denies any vascular surgical intervention and hx of radiation therapy. Patient denies any fever, abdominal pain, weight loss, dysphagia.       Prior EGD:    Prior Colonoscopy:    Past Medical and Surgical History:  HTN (hypertension)    Heart murmur    Eczema    Anemia  iron infusions and PRBC transfusions    Aortic stenosis    Chronic kidney disease (CKD)    2019 novel coronavirus disease (COVID-19)  2020- [facility] admission    Gastric AVM    H/O appendicitis    S/P cholecystectomy    History of esophagogastroduodenoscopy (EGD)    H/O colonoscopy    H/O  section    History of appendectomy    Port-A-Cath in place  right CW        Home Medications:  Home Medications:  diphenhydrAMINE 25 mg oral capsule: 1 orally once a day (at bedtime) (07 Dec 2023 00:14)  folic acid 1 mg oral tablet: 1 tab(s) orally once a day (07 Dec 2023 00:14)  furosemide 40 mg oral tablet: 1 tab(s) orally once a day (07 Dec 2023 00:14)  metoprolol tartrate 25 mg oral tablet: 1 tab(s) orally 2 times a day (07 Dec 2023 00:14)      Social History:  Social History:    Tobacco:  Alcohol:  Drugs:    Allergies:  EKG leads (Hives)  penicillins (Rash; Urticaria; Hives; Blisters)  latex (Unknown)  aspirin (Rash; Other)      Family History:  FAMILY HISTORY:  FH: kidney disease (Father)  FH: breast cancer (Mother)  FH: multiple myeloma (Mother)      Vital Signs in the last 24 hours   Vitals Summary T(C): 36.6 (23 @ 18:15), Max: 36.8 (23 @ 10:30)  HR: 67 (23 @ 18:45) (60 - 85)  BP: 114/53 (23 @ 18:45) (103/46 - 115/57)  RR: 18 (23 @ 18:45) (18 - 20)  SpO2: 100% (23 @ 18:45) (96% - 100%)  Vent Data   Intake/ Output   23 @ 07:01  -  23 @ 19:36  --------------------------------------------------------  IN: 418 mL / OUT: 300 mL / NET: 118 mL      Physical Exam  * General Appearance: Alert, cooperative, interactive, oriented to time, place, and person, in no acute distress  * Lungs: Good bilateral air entry  * Heart: Regular Rate and Rhythm, systolic murmur along A area, no audible rub or gallop  * Abdomen: Symmetric, non-distended, soft, non-tender, bowel sounds active all four quadrants  * Rectal: refused in ED      Investigations   Laboratory Workup      - CBC:                        7.4    3.79  )-----------( 102      ( 07 Dec 2023 11:54 )             24.2       - Hgb Trend:  7.4  23 @ 11:54  4.3  23 @ 17:52          - Chemistry:      143  |  111<H>  |  63<HH>  ----------------------------<  89  5.1<H>   |  23  |  2.6<H>    Ca    8.1<L>      07 Dec 2023 11:54    TPro  5.5<L>  /  Alb  3.1<L>  /  TBili  1.3<H>  /  DBili  x   /  AST  11  /  ALT  <5  /  AlkPhos  89      Liver panel trend:  TBili 1.3   /   AST 11   /   ALT <5   /   AlkP 89   /   Tptn 5.5   /   Alb 3.1    /   DBili --        TBili 0.5   /   AST 12   /   ALT <5   /   AlkP 87   /   Tptn 5.1   /   Alb 3.2    /   DBili --            - Coagulation Studies:  PT/INR - ( 07 Dec 2023 11:54 )   PT: 11.50 sec;   INR: 1.01 ratio    PTT - ( 07 Dec 2023 11:54 )  PTT:32.9 sec      - Cardiac Markers:  CARDIAC MARKERS ( 07 Dec 2023 11:54 )  x     / 0.01 ng/mL / x     / x     / x      CARDIAC MARKERS ( 06 Dec 2023 17:52 )  x     / 0.02 ng/mL / x     / x     / x          Microbiological Workup  Urinalysis Basic - ( 07 Dec 2023 11:54 )    Color: x / Appearance: x / SG: x / pH: x  Gluc: 89 mg/dL / Ketone: x  / Bili: x / Urobili: x   Blood: x / Protein: x / Nitrite: x   Leuk Esterase: x / RBC: x / WBC x   Sq Epi: x / Non Sq Epi: x / Bacteria: x      Current Medications  Standing Medications  diphenhydrAMINE 25 milliGRAM(s) Oral at bedtime  folic acid 1 milliGRAM(s) Oral daily  furosemide    Tablet 40 milliGRAM(s) Oral daily  metoprolol tartrate 25 milliGRAM(s) Oral two times a day  pantoprazole  Injectable 40 milliGRAM(s) IV Push two times a day  sodium bicarbonate 650 milliGRAM(s) Oral every 8 hours    PRN Medications    Singles Doses Administered  (ADM OVERRIDE) 2 each &lt;see task&gt; GiveOnce  (ADM OVERRIDE) 1 each &lt;see task&gt; GiveOnce  (ADM OVERRIDE) 1 each &lt;see task&gt; GiveOnce  (ADM OVERRIDE) 1 each &lt;see task&gt; GiveOnce  (ADM OVERRIDE) 1 each &lt;see task&gt; GiveOnce  (ADM OVERRIDE) 1 each &lt;see task&gt; GiveOnce  (ADM OVERRIDE) 1 each &lt;see task&gt; GiveOnce  (ADM OVERRIDE) 3 each &lt;see task&gt; GiveOnce  (ADM OVERRIDE) 1 each &lt;see task&gt; GiveOnce  (ADM OVERRIDE) 1 each &lt;see task&gt; GiveOnce  furosemide   Injectable 40 milliGRAM(s) IV Push once  pantoprazole  Injectable 80 milliGRAM(s) IV Push Once       Gastroenterology Initial Consult Note      Location: [address] ([address])  Patient Name: LATRICIA ARREAGA  Age: 78y  Gender: Female      Chief Complaint  Patient is a 78y old Female who presents with a chief complaint of Anemia (07 Dec 2023 12:14)  Primary diagnosis of Anemia      Reason for Consult  Anemia  History of Bleeding AVM      History of Present Illness  78 year old female patient known to have CKD, HTN, CCY, severe AS being planned for tAVR, and history of anemia secondary to 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 We are consulted for concern of anemia in the setting of history of bleeding AVMs.      Summary:  * Evaluated for anemia in past; pancytopenia thought to be secondary to COVID per heme onc; refused BM bx for MDS evaluation  * Had EGD 2023 by Dr Fisher: 6mm antral polyp; 1 AVM in fundus s/p APC and clip; Dieulafoy lesion in anterior bulb s/p clip; AVM in D bulb s/p clip  * Had colonoscopy 2019: moderate diverticulosis and internal hemorrhoids; solid stool so recommended repeat  * Had VCE in 2022 followed by enteroscopy. Last enteroscopy 2023: oozing from Dieulafoy lesions at D3 s/p clips  * Baseline hemoglobin (prior to admission): 6.9-8.8 in   * ED Vitals:  /53 mmHg, HR 78 bpm   * Hemoglobin on admission: Hb 4.3 s/p 3 units of pRBCs -> Hb 7.4 then s/p 1 unit -> no repeat yet  * Coags: INR 1.05 on   * Not on AP or AC  * Refused OLEKSANDR in ED  * No current CT scan  * Family History: breast cancer in mother; no GI malignancies      Prior EGD:  as below      Prior Colonoscopy:  as below      Past Medical and Surgical History:  HTN (hypertension)    Heart murmur    Eczema    Anemia  iron infusions and PRBC transfusions    Aortic stenosis    Chronic kidney disease (CKD)     novel coronavirus disease (COVID-19)  2020- [facility] admission    Gastric AVM    H/O appendicitis    S/P cholecystectomy    History of esophagogastroduodenoscopy (EGD)    H/O colonoscopy    H/O  section    History of appendectomy    Port-A-Cath in place  right CW      Home Medications:  Home Medications:  diphenhydrAMINE 25 mg oral capsule: 1 orally once a day (at bedtime) (07 Dec 2023 00:14)  folic acid 1 mg oral tablet: 1 tab(s) orally once a day (07 Dec 2023 00:14)  furosemide 40 mg oral tablet: 1 tab(s) orally once a day (07 Dec 2023 00:14)  metoprolol tartrate 25 mg oral tablet: 1 tab(s) orally 2 times a day (07 Dec 2023 00:14)      Social History:  Tobacco: denies  Alcohol: denies  Drugs: denies      Allergies:  EKG leads (Hives)  penicillins (Rash; Urticaria; Hives; Blisters)  latex (Unknown)  aspirin (Rash; Other)      Family History:  FAMILY HISTORY:  FH: kidney disease (Father)  FH: breast cancer (Mother)  FH: multiple myeloma (Mother)      Vital Signs in the last 24 hours   Vitals Summary T(C): 36.6 (23 @ 18:15), Max: 36.8 (23 @ 10:30)  HR: 67 (23 @ 18:45) (60 - 85)  BP: 114/53 (23 @ 18:45) (103/46 - 115/57)  RR: 18 (23 @ 18:45) (18 - 20)  SpO2: 100% (23 @ 18:45) (96% - 100%)  Vent Data   Intake/ Output   23 @ 07:01  -  23 @ 19:36  --------------------------------------------------------  IN: 418 mL / OUT: 300 mL / NET: 118 mL      Physical Exam  * General Appearance: Alert, cooperative, interactive, oriented to time, place, and person, in no acute distress  * Lungs: Good bilateral air entry  * Heart: Regular Rate and Rhythm, systolic murmur along A area, no audible rub or gallop  * Abdomen: Symmetric, non-distended, soft, non-tender, bowel sounds active all four quadrants  * Rectal: refused in ED      Investigations   Laboratory Workup      - CBC:                        7.4    3.79  )-----------( 102      ( 07 Dec 2023 11:54 )             24.2       - Hgb Trend:  7.4  23 @ 11:54  4.3  23 @ 17:52          - Chemistry:      143  |  111<H>  |  63<HH>  ----------------------------<  89  5.1<H>   |  23  |  2.6<H>    Ca    8.1<L>      07 Dec 2023 11:54    TPro  5.5<L>  /  Alb  3.1<L>  /  TBili  1.3<H>  /  DBili  x   /  AST  11  /  ALT  <5  /  AlkPhos  89      Liver panel trend:  TBili 1.3   /   AST 11   /   ALT <5   /   AlkP 89   /   Tptn 5.5   /   Alb 3.1    /   DBili --        TBili 0.5   /   AST 12   /   ALT <5   /   AlkP 87   /   Tptn 5.1   /   Alb 3.2    /   DBili --            - Coagulation Studies:  PT/INR - ( 07 Dec 2023 11:54 )   PT: 11.50 sec;   INR: 1.01 ratio    PTT - ( 07 Dec 2023 11:54 )  PTT:32.9 sec      - Cardiac Markers:  CARDIAC MARKERS ( 07 Dec 2023 11:54 )  x     / 0.01 ng/mL / x     / x     / x      CARDIAC MARKERS ( 06 Dec 2023 17:52 )  x     / 0.02 ng/mL / x     / x     / x          Microbiological Workup  Urinalysis Basic - ( 07 Dec 2023 11:54 )    Color: x / Appearance: x / SG: x / pH: x  Gluc: 89 mg/dL / Ketone: x  / Bili: x / Urobili: x   Blood: x / Protein: x / Nitrite: x   Leuk Esterase: x / RBC: x / WBC x   Sq Epi: x / Non Sq Epi: x / Bacteria: x      Current Medications  Standing Medications  diphenhydrAMINE 25 milliGRAM(s) Oral at bedtime  folic acid 1 milliGRAM(s) Oral daily  furosemide    Tablet 40 milliGRAM(s) Oral daily  metoprolol tartrate 25 milliGRAM(s) Oral two times a day  pantoprazole  Injectable 40 milliGRAM(s) IV Push two times a day  sodium bicarbonate 650 milliGRAM(s) Oral every 8 hours    PRN Medications    Singles Doses Administered  (ADM OVERRIDE) 2 each &lt;see task&gt; GiveOnce  (ADM OVERRIDE) 1 each &lt;see task&gt; GiveOnce  (ADM OVERRIDE) 1 each &lt;see task&gt; GiveOnce  (ADM OVERRIDE) 1 each &lt;see task&gt; GiveOnce  (ADM OVERRIDE) 1 each &lt;see task&gt; GiveOnce  (ADM OVERRIDE) 1 each &lt;see task&gt; GiveOnce  (ADM OVERRIDE) 1 each &lt;see task&gt; GiveOnce  (ADM OVERRIDE) 3 each &lt;see task&gt; GiveOnce  (ADM OVERRIDE) 1 each &lt;see task&gt; GiveOnce  (ADM OVERRIDE) 1 each &lt;see task&gt; GiveOnce  furosemide   Injectable 40 milliGRAM(s) IV Push once  pantoprazole  Injectable 80 milliGRAM(s) IV Push Once       Gastroenterology Initial Consult Note      Location: [address] ([address])  Patient Name: LATRICIA ARREAGA  Age: 78y  Gender: Female      Chief Complaint  Patient is a 78y old Female who presents with a chief complaint of Anemia (07 Dec 2023 12:14)  Primary diagnosis of Anemia      Reason for Consult  Anemia  History of Bleeding AVM      History of Present Illness  78 year old female patient known to have CKD, HTN, CCY, severe AS being planned for tAVR, and history of anemia secondary to 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 We are consulted for concern of anemia in the setting of history of bleeding AVMs.      Summary:  * Evaluated for anemia in past; pancytopenia thought to be secondary to COVID per heme onc; refused BM bx for MDS evaluation  * Had EGD 2023 by Dr Fisher: 6mm antral polyp; 1 AVM in fundus s/p APC and clip; Dieulafoy lesion in anterior bulb s/p clip; AVM in D bulb s/p clip  * Had colonoscopy 2019: moderate diverticulosis and internal hemorrhoids; solid stool so recommended repeat  * Had VCE in 2022 followed by enteroscopy. Last enteroscopy 2023: oozing from Dieulafoy lesions at D3 s/p clips  * Baseline hemoglobin (prior to admission): 6.9-8.8 in   * ED Vitals:  /53 mmHg, HR 78 bpm   * Hemoglobin on admission: Hb 4.3 s/p 3 units of pRBCs -> Hb 7.4 then s/p 1 unit -> no repeat yet  * Coags: INR 1.05 on   * Not on AP or AC  * Refused OLEKSANDR in ED  * No current CT scan  * Family History: breast cancer in mother; no GI malignancies      Prior EGD:  as below      Prior Colonoscopy:  as below      Past Medical and Surgical History:  HTN (hypertension)    Heart murmur    Eczema    Anemia  iron infusions and PRBC transfusions    Aortic stenosis    Chronic kidney disease (CKD)     novel coronavirus disease (COVID-19)  2020- [facility] admission    Gastric AVM    H/O appendicitis    S/P cholecystectomy    History of esophagogastroduodenoscopy (EGD)    H/O colonoscopy    H/O  section    History of appendectomy    Port-A-Cath in place  right CW      Home Medications:  Home Medications:  diphenhydrAMINE 25 mg oral capsule: 1 orally once a day (at bedtime) (07 Dec 2023 00:14)  folic acid 1 mg oral tablet: 1 tab(s) orally once a day (07 Dec 2023 00:14)  furosemide 40 mg oral tablet: 1 tab(s) orally once a day (07 Dec 2023 00:14)  metoprolol tartrate 25 mg oral tablet: 1 tab(s) orally 2 times a day (07 Dec 2023 00:14)      Social History:  Tobacco: denies  Alcohol: denies  Drugs: denies      Allergies:  EKG leads (Hives)  penicillins (Rash; Urticaria; Hives; Blisters)  latex (Unknown)  aspirin (Rash; Other)      Family History:  FAMILY HISTORY:  FH: kidney disease (Father)  FH: breast cancer (Mother)  FH: multiple myeloma (Mother)      Vital Signs in the last 24 hours   Vitals Summary T(C): 36.6 (23 @ 18:15), Max: 36.8 (23 @ 10:30)  HR: 67 (23 @ 18:45) (60 - 85)  BP: 114/53 (23 @ 18:45) (103/46 - 115/57)  RR: 18 (23 @ 18:45) (18 - 20)  SpO2: 100% (23 @ 18:45) (96% - 100%)  Vent Data   Intake/ Output   23 @ 07:01  -  23 @ 19:36  --------------------------------------------------------  IN: 418 mL / OUT: 300 mL / NET: 118 mL      Physical Exam  * General Appearance: Alert, cooperative, interactive, oriented to time, place, and person, in no acute distress  * Lungs: Good bilateral air entry  * Heart: Regular Rate and Rhythm, systolic murmur along A area, no audible rub or gallop  * Abdomen: Symmetric, non-distended, soft, non-tender, bowel sounds active all four quadrants  * Rectal: refused in ED      Investigations   Laboratory Workup      - CBC:                        7.4    3.79  )-----------( 102      ( 07 Dec 2023 11:54 )             24.2       - Hgb Trend:  7.4  23 @ 11:54  4.3  23 @ 17:52          - Chemistry:      143  |  111<H>  |  63<HH>  ----------------------------<  89  5.1<H>   |  23  |  2.6<H>    Ca    8.1<L>      07 Dec 2023 11:54    TPro  5.5<L>  /  Alb  3.1<L>  /  TBili  1.3<H>  /  DBili  x   /  AST  11  /  ALT  <5  /  AlkPhos  89      Liver panel trend:  TBili 1.3   /   AST 11   /   ALT <5   /   AlkP 89   /   Tptn 5.5   /   Alb 3.1    /   DBili --        TBili 0.5   /   AST 12   /   ALT <5   /   AlkP 87   /   Tptn 5.1   /   Alb 3.2    /   DBili --            - Coagulation Studies:  PT/INR - ( 07 Dec 2023 11:54 )   PT: 11.50 sec;   INR: 1.01 ratio    PTT - ( 07 Dec 2023 11:54 )  PTT:32.9 sec      - Cardiac Markers:  CARDIAC MARKERS ( 07 Dec 2023 11:54 )  x     / 0.01 ng/mL / x     / x     / x      CARDIAC MARKERS ( 06 Dec 2023 17:52 )  x     / 0.02 ng/mL / x     / x     / x          Microbiological Workup  Urinalysis Basic - ( 07 Dec 2023 11:54 )    Color: x / Appearance: x / SG: x / pH: x  Gluc: 89 mg/dL / Ketone: x  / Bili: x / Urobili: x   Blood: x / Protein: x / Nitrite: x   Leuk Esterase: x / RBC: x / WBC x   Sq Epi: x / Non Sq Epi: x / Bacteria: x      Current Medications  Standing Medications  diphenhydrAMINE 25 milliGRAM(s) Oral at bedtime  folic acid 1 milliGRAM(s) Oral daily  furosemide    Tablet 40 milliGRAM(s) Oral daily  metoprolol tartrate 25 milliGRAM(s) Oral two times a day  pantoprazole  Injectable 40 milliGRAM(s) IV Push two times a day  sodium bicarbonate 650 milliGRAM(s) Oral every 8 hours    PRN Medications    Singles Doses Administered  (ADM OVERRIDE) 2 each &lt;see task&gt; GiveOnce  (ADM OVERRIDE) 1 each &lt;see task&gt; GiveOnce  (ADM OVERRIDE) 1 each &lt;see task&gt; GiveOnce  (ADM OVERRIDE) 1 each &lt;see task&gt; GiveOnce  (ADM OVERRIDE) 1 each &lt;see task&gt; GiveOnce  (ADM OVERRIDE) 1 each &lt;see task&gt; GiveOnce  (ADM OVERRIDE) 1 each &lt;see task&gt; GiveOnce  (ADM OVERRIDE) 3 each &lt;see task&gt; GiveOnce  (ADM OVERRIDE) 1 each &lt;see task&gt; GiveOnce  (ADM OVERRIDE) 1 each &lt;see task&gt; GiveOnce  furosemide   Injectable 40 milliGRAM(s) IV Push once  pantoprazole  Injectable 80 milliGRAM(s) IV Push Once

## 2023-12-08 ENCOUNTER — APPOINTMENT (OUTPATIENT)
Dept: INFUSION THERAPY | Facility: CLINIC | Age: 78
End: 2023-12-08

## 2023-12-08 ENCOUNTER — TRANSCRIPTION ENCOUNTER (OUTPATIENT)
Age: 78
End: 2023-12-08

## 2023-12-08 LAB
HCT VFR BLD CALC: 24.4 % — LOW (ref 37–47)
HCT VFR BLD CALC: 24.4 % — LOW (ref 37–47)
HCT VFR BLD CALC: 25.4 % — LOW (ref 37–47)
HCT VFR BLD CALC: 25.4 % — LOW (ref 37–47)
HCT VFR BLD CALC: 27 % — LOW (ref 37–47)
HCT VFR BLD CALC: 27 % — LOW (ref 37–47)
HGB BLD-MCNC: 7.8 G/DL — LOW (ref 12–16)
HGB BLD-MCNC: 7.8 G/DL — LOW (ref 12–16)
HGB BLD-MCNC: 7.9 G/DL — LOW (ref 12–16)
HGB BLD-MCNC: 7.9 G/DL — LOW (ref 12–16)
HGB BLD-MCNC: 8.4 G/DL — LOW (ref 12–16)
HGB BLD-MCNC: 8.4 G/DL — LOW (ref 12–16)
MCHC RBC-ENTMCNC: 28.7 PG — SIGNIFICANT CHANGE UP (ref 27–31)
MCHC RBC-ENTMCNC: 28.7 PG — SIGNIFICANT CHANGE UP (ref 27–31)
MCHC RBC-ENTMCNC: 28.8 PG — SIGNIFICANT CHANGE UP (ref 27–31)
MCHC RBC-ENTMCNC: 28.8 PG — SIGNIFICANT CHANGE UP (ref 27–31)
MCHC RBC-ENTMCNC: 29.2 PG — SIGNIFICANT CHANGE UP (ref 27–31)
MCHC RBC-ENTMCNC: 29.2 PG — SIGNIFICANT CHANGE UP (ref 27–31)
MCHC RBC-ENTMCNC: 31.1 G/DL — LOW (ref 32–37)
MCHC RBC-ENTMCNC: 32 G/DL — SIGNIFICANT CHANGE UP (ref 32–37)
MCHC RBC-ENTMCNC: 32 G/DL — SIGNIFICANT CHANGE UP (ref 32–37)
MCV RBC AUTO: 91.4 FL — SIGNIFICANT CHANGE UP (ref 81–99)
MCV RBC AUTO: 91.4 FL — SIGNIFICANT CHANGE UP (ref 81–99)
MCV RBC AUTO: 92.2 FL — SIGNIFICANT CHANGE UP (ref 81–99)
MCV RBC AUTO: 92.2 FL — SIGNIFICANT CHANGE UP (ref 81–99)
MCV RBC AUTO: 92.7 FL — SIGNIFICANT CHANGE UP (ref 81–99)
MCV RBC AUTO: 92.7 FL — SIGNIFICANT CHANGE UP (ref 81–99)
NRBC # BLD: 0 /100 WBCS — SIGNIFICANT CHANGE UP (ref 0–0)
PLATELET # BLD AUTO: 103 K/UL — LOW (ref 130–400)
PMV BLD: 10 FL — SIGNIFICANT CHANGE UP (ref 7.4–10.4)
PMV BLD: 10 FL — SIGNIFICANT CHANGE UP (ref 7.4–10.4)
PMV BLD: 10.1 FL — SIGNIFICANT CHANGE UP (ref 7.4–10.4)
PMV BLD: 10.1 FL — SIGNIFICANT CHANGE UP (ref 7.4–10.4)
PMV BLD: 10.5 FL — HIGH (ref 7.4–10.4)
PMV BLD: 10.5 FL — HIGH (ref 7.4–10.4)
RBC # BLD: 2.67 M/UL — LOW (ref 4.2–5.4)
RBC # BLD: 2.67 M/UL — LOW (ref 4.2–5.4)
RBC # BLD: 2.74 M/UL — LOW (ref 4.2–5.4)
RBC # BLD: 2.74 M/UL — LOW (ref 4.2–5.4)
RBC # BLD: 2.93 M/UL — LOW (ref 4.2–5.4)
RBC # BLD: 2.93 M/UL — LOW (ref 4.2–5.4)
RBC # FLD: 15.9 % — HIGH (ref 11.5–14.5)
RBC # FLD: 15.9 % — HIGH (ref 11.5–14.5)
RBC # FLD: 16 % — HIGH (ref 11.5–14.5)
RBC # FLD: 16 % — HIGH (ref 11.5–14.5)
RBC # FLD: 16.2 % — HIGH (ref 11.5–14.5)
RBC # FLD: 16.2 % — HIGH (ref 11.5–14.5)
WBC # BLD: 3.07 K/UL — LOW (ref 4.8–10.8)
WBC # BLD: 3.07 K/UL — LOW (ref 4.8–10.8)
WBC # BLD: 3.11 K/UL — LOW (ref 4.8–10.8)
WBC # BLD: 3.11 K/UL — LOW (ref 4.8–10.8)
WBC # BLD: 3.41 K/UL — LOW (ref 4.8–10.8)
WBC # BLD: 3.41 K/UL — LOW (ref 4.8–10.8)
WBC # FLD AUTO: 3.07 K/UL — LOW (ref 4.8–10.8)
WBC # FLD AUTO: 3.07 K/UL — LOW (ref 4.8–10.8)
WBC # FLD AUTO: 3.11 K/UL — LOW (ref 4.8–10.8)
WBC # FLD AUTO: 3.11 K/UL — LOW (ref 4.8–10.8)
WBC # FLD AUTO: 3.41 K/UL — LOW (ref 4.8–10.8)
WBC # FLD AUTO: 3.41 K/UL — LOW (ref 4.8–10.8)

## 2023-12-08 PROCEDURE — 99232 SBSQ HOSP IP/OBS MODERATE 35: CPT

## 2023-12-08 RX ADMIN — Medication 1 MILLIGRAM(S): at 13:18

## 2023-12-08 RX ADMIN — Medication 650 MILLIGRAM(S): at 13:18

## 2023-12-08 RX ADMIN — PANTOPRAZOLE SODIUM 40 MILLIGRAM(S): 20 TABLET, DELAYED RELEASE ORAL at 18:25

## 2023-12-08 RX ADMIN — Medication 25 MILLIGRAM(S): at 18:25

## 2023-12-08 RX ADMIN — Medication 25 MILLIGRAM(S): at 21:36

## 2023-12-08 RX ADMIN — Medication 650 MILLIGRAM(S): at 06:11

## 2023-12-08 RX ADMIN — PANTOPRAZOLE SODIUM 40 MILLIGRAM(S): 20 TABLET, DELAYED RELEASE ORAL at 06:11

## 2023-12-08 RX ADMIN — Medication 650 MILLIGRAM(S): at 21:36

## 2023-12-08 RX ADMIN — Medication 40 MILLIGRAM(S): at 06:11

## 2023-12-08 RX ADMIN — Medication 25 MILLIGRAM(S): at 06:11

## 2023-12-08 NOTE — DISCHARGE NOTE PROVIDER - PROVIDER TOKENS
PROVIDER:[TOKEN:[[id number]],FOLLOWUP:[2 weeks]],PROVIDER:[TOKEN:[[id number]],FOLLOWUP:[2 weeks]],PROVIDER:[TOKEN:[[id number]],FOLLOWUP:[1 week]],PROVIDER:[TOKEN:[[id number]],FOLLOWUP:[2 weeks]] PROVIDER:[TOKEN:[[id number]],FOLLOWUP:[2 weeks]],PROVIDER:[TOKEN:[[id number]],FOLLOWUP:[2 weeks]],PROVIDER:[TOKEN:[[id number]],FOLLOWUP:[1 week]],PROVIDER:[TOKEN:[[id number]],FOLLOWUP:[2 weeks]]

## 2023-12-08 NOTE — DISCHARGE NOTE PROVIDER - NSDCCPCAREPLAN_GEN_ALL_CORE_FT
PRINCIPAL DISCHARGE DIAGNOSIS  Diagnosis: Severe anemia  Assessment and Plan of Treatment: Anemia  Anemia is a condition in which the concentration of red blood cells or hemoglobin in the blood is below normal. Hemoglobin is a substance in red blood cells that carries oxygen to the tissues of the body. Anemia results in not enough oxygen reaching these tissues which can cause symptoms such as weakness, dizziness/lightheadedness, shortness of breath, chest pain, paleness, or nausea. The cause of your anemia may or may not be determined immediately. If your hemoglobin was dangerously low, you may have received a blood transfusion. Usually reactions to transfusions occur immediately but monitor yourself for any fevers, rash, or shortness of breath.  SEEK IMMEDIATE MEDICAL CARE IF YOU HAVE ANY OF THE FOLLOWING SYMPTOMS: extreme weakness/chest pain/shortness of breath, black or bloody stools, vomiting blood, fainting, fever, or any signs of dehydration.

## 2023-12-08 NOTE — DISCHARGE NOTE PROVIDER - NPI NUMBER (FOR SYSADMIN USE ONLY) :
[[id number]],[[id number]],[[id number]],[[id number]] [[id number]],[[id number]],[[id number]],[[id number]]

## 2023-12-08 NOTE — PROGRESS NOTE ADULT - ASSESSMENT
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 We are consulted for concern of anemia in the setting of history of bleeding AVMs.      Pancytopenia: Likely Secondary to COVID Versus Suspected MDS (no BM Bx)  Acute on Chronic Anemia   No Overt GI Bleeding  AVMs and Dieulafoy Lesions Status Post Hemostasis  History of Perisplenic Varices: Rule Out Noncirrhotic Portal Hypertension  Acute Drop in Hemoglobin  * Evaluated for anemia in past; pancytopenia thought to be secondary to COVID per heme onc; refused BM bx for MDS evaluation  * Had EGD 08/23/2023 by Dr Fisher: 6mm antral polyp; 1 AVM in fundus s/p APC and clip; Dieulafoy lesion in anterior bulb s/p clip; AVM in D bulb s/p clip  * Had colonoscopy 2019: moderate diverticulosis and internal hemorrhoids; solid stool so recommended repeat  * Had VCE in 02/2022 followed by enteroscopy. Last enteroscopy 04/03/2023: oozing from Dieulafoy lesions at D3 s/p clips  * Baseline hemoglobin (prior to admission): 6.9-8.8 in 2022  * ED Vitals:  /53 mmHg, HR 78 bpm   * Hemoglobin on admission: Hb 4.3 s/p 3 units of pRBCs -> Hb 7.4 then s/p 1 unit -> no repeat yet  * Coags: INR 1.05 on 12/06  * Not on AP or AC  * Refused OLEKSANDR in ED  * No current CT scan  * Family History: breast cancer in mother; no GI malignancies  * Status post EGD and Push Enteroscopy on 12/07: Normal mucosa in the whole esophagus. Erosions in the stomach compatible with erosive gastritis. Angioectasias in the body. (Hemostasis). Angioectasias in the fourth portion of the duodenum. (Hemostasis).    RECOMMENDATIONS  - Recommend US Doppler to rule out thrombosis for workup of non cirrhotic portal hypertension in setting of perisplenic varices  - Advance diet as tolerated  - Monitor for signs of bleeding  - Target Hb >8  - Patient will eventually need repair of severe aortic stenosis as recurrence risk of AVMs are high in these patients  - Consider Octreotide LAR 10mg/month Depot shots given multiple admissions formrecurrent AVMs and need for endoscopic intervention  - Protonix 40 mg po daily      Thank you for your consult.  - Please note that plan was communicated with medical team.   - Please reach GI on [phone] during weekdays till 5pm.  - Please call the GI service line after 5pm on Weekdays and anytime on Weekends: [phone].      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 We are consulted for concern of anemia in the setting of history of bleeding AVMs.      Pancytopenia: Likely Secondary to COVID Versus Suspected MDS (no BM Bx)  Acute on Chronic Anemia   No Overt GI Bleeding  AVMs and Dieulafoy Lesions Status Post Hemostasis  History of Perisplenic Varices: Rule Out Noncirrhotic Portal Hypertension  Acute Drop in Hemoglobin  * Evaluated for anemia in past; pancytopenia thought to be secondary to COVID per heme onc; refused BM bx for MDS evaluation  * Had EGD 08/23/2023 by Dr Fisher: 6mm antral polyp; 1 AVM in fundus s/p APC and clip; Dieulafoy lesion in anterior bulb s/p clip; AVM in D bulb s/p clip  * Had colonoscopy 2019: moderate diverticulosis and internal hemorrhoids; solid stool so recommended repeat  * Had VCE in 02/2022 followed by enteroscopy. Last enteroscopy 04/03/2023: oozing from Dieulafoy lesions at D3 s/p clips  * Baseline hemoglobin (prior to admission): 6.9-8.8 in 2022  * ED Vitals:  /53 mmHg, HR 78 bpm   * Hemoglobin on admission: Hb 4.3 s/p 3 units of pRBCs -> Hb 7.4 then s/p 1 unit -> no repeat yet  * Coags: INR 1.05 on 12/06  * Not on AP or AC  * Refused OLEKSANDR in ED  * No current CT scan  * Family History: breast cancer in mother; no GI malignancies  * Status post EGD and Push Enteroscopy on 12/07: Normal mucosa in the whole esophagus. Erosions in the stomach compatible with erosive gastritis. Angioectasias in the body. (Hemostasis). Angioectasias in the fourth portion of the duodenum. (Hemostasis).    RECOMMENDATIONS  - Recommend US Doppler to rule out thrombosis for workup of non cirrhotic portal hypertension in setting of perisplenic varices  - Advance diet as tolerated  - Monitor for signs of bleeding  - Target Hb >8  - Patient will eventually need repair of severe aortic stenosis as recurrence risk of AVMs are high in these patients  - Consider Octreotide LAR 10mg/month Depot shots given multiple admissions formrecurrent AVMs and need for endoscopic intervention  - Protonix 40 mg po daily      Thank you for your consult.  - Please note that plan was communicated with medical team.   - Please reach GI on [phone] during weekdays till 5pm.  - Please call the GI service line after 5pm on Weekdays and anytime on Weekends: [phone].      Constanza Donato MD  PGY - 4 Gastroenterology Fellow   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 We are consulted for concern of anemia in the setting of history of bleeding AVMs.      Pancytopenia: Likely Secondary to COVID Versus Suspected MDS (no BM Bx)  Acute on Chronic Anemia   No Overt GI Bleeding  AVMs and Dieulafoy Lesions Status Post Hemostasis  History of Perisplenic Varices: Rule Out Noncirrhotic Portal Hypertension  Acute Drop in Hemoglobin  * Evaluated for anemia in past; pancytopenia thought to be secondary to COVID per heme onc; refused BM bx for MDS evaluation  * Had EGD 08/23/2023 by Dr Fisher: 6mm antral polyp; 1 AVM in fundus s/p APC and clip; Dieulafoy lesion in anterior bulb s/p clip; AVM in D bulb s/p clip  * Had colonoscopy 2019: moderate diverticulosis and internal hemorrhoids; solid stool so recommended repeat  * Had VCE in 02/2022 followed by enteroscopy. Last enteroscopy 04/03/2023: oozing from Dieulafoy lesions at D3 s/p clips  * Baseline hemoglobin (prior to admission): 6.9-8.8 in 2022  * ED Vitals:  /53 mmHg, HR 78 bpm   * Hemoglobin on admission: Hb 4.3 s/p 3 units of pRBCs -> Hb 7.4 then s/p 1 unit -> no repeat yet  * Coags: INR 1.05 on 12/06  * Not on AP or AC  * Refused OLEKSANDR in ED  * No current CT scan  * Family History: breast cancer in mother; no GI malignancies  * Status post EGD and Push Enteroscopy on 12/07: Normal mucosa in the whole esophagus. Erosions in the stomach compatible with erosive gastritis. Angioectasias in the body. (Hemostasis). Angioectasias in the fourth portion of the duodenum. (Hemostasis).    RECOMMENDATIONS  - Recommend US Doppler to rule out thrombosis for workup of non cirrhotic portal hypertension in setting of perisplenic varices  - Advance diet as tolerated  - Monitor for signs of bleeding  - Target Hb >8  - Patient will eventually need repair of severe aortic stenosis as recurrence risk of AVMs are high in these patients  - Consider Octreotide LAR 10mg/month Depot shots given multiple admissions for recurrent AVMs and need for endoscopic intervention  - Protonix 40 mg po daily      Thank you for your consult.  - Please note that plan was communicated with medical team.   - Please reach GI on [phone] during weekdays till 5pm.  - Please call the GI service line after 5pm on Weekdays and anytime on Weekends: [phone].      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 We are consulted for concern of anemia in the setting of history of bleeding AVMs.      Pancytopenia: Likely Secondary to COVID Versus Suspected MDS (no BM Bx)  Acute on Chronic Anemia   No Overt GI Bleeding  AVMs and Dieulafoy Lesions Status Post Hemostasis  History of Perisplenic Varices: Rule Out Noncirrhotic Portal Hypertension  Acute Drop in Hemoglobin  * Evaluated for anemia in past; pancytopenia thought to be secondary to COVID per heme onc; refused BM bx for MDS evaluation  * Had EGD 08/23/2023 by Dr Fisher: 6mm antral polyp; 1 AVM in fundus s/p APC and clip; Dieulafoy lesion in anterior bulb s/p clip; AVM in D bulb s/p clip  * Had colonoscopy 2019: moderate diverticulosis and internal hemorrhoids; solid stool so recommended repeat  * Had VCE in 02/2022 followed by enteroscopy. Last enteroscopy 04/03/2023: oozing from Dieulafoy lesions at D3 s/p clips  * Baseline hemoglobin (prior to admission): 6.9-8.8 in 2022  * ED Vitals:  /53 mmHg, HR 78 bpm   * Hemoglobin on admission: Hb 4.3 s/p 3 units of pRBCs -> Hb 7.4 then s/p 1 unit -> no repeat yet  * Coags: INR 1.05 on 12/06  * Not on AP or AC  * Refused OLEKSANDR in ED  * No current CT scan  * Family History: breast cancer in mother; no GI malignancies  * Status post EGD and Push Enteroscopy on 12/07: Normal mucosa in the whole esophagus. Erosions in the stomach compatible with erosive gastritis. Angioectasias in the body. (Hemostasis). Angioectasias in the fourth portion of the duodenum. (Hemostasis).    RECOMMENDATIONS  - Recommend US Doppler to rule out thrombosis for workup of non cirrhotic portal hypertension in setting of perisplenic varices  - Advance diet as tolerated  - Monitor for signs of bleeding  - Target Hb >8  - Patient will eventually need repair of severe aortic stenosis as recurrence risk of AVMs are high in these patients  - Consider Octreotide LAR 10mg/month Depot shots given multiple admissions for recurrent AVMs and need for endoscopic intervention  - Protonix 40 mg po daily      Thank you for your consult.  - Please note that plan was communicated with medical team.   - Please reach GI on [phone] during weekdays till 5pm.  - Please call the GI service line after 5pm on Weekdays and anytime on Weekends: [phone].      Constanza Donato MD  PGY - 4 Gastroenterology Fellow   [facility]

## 2023-12-08 NOTE — PROGRESS NOTE ADULT - SUBJECTIVE AND OBJECTIVE BOX
Gastroenterology Follow Up Note      Location: [address] ([address])  Patient Name: LATRICIA ARREAGA  Age: 78y  Gender: Female      Chief Complaint  Patient is a 78y old Female who presents with a chief complaint of Severe Anemia GI malformations (08 Dec 2023 13:18)  Primary diagnosis of Anemia      Reason for Consult  Anemia      Progress Note  This morning patient was seen and examined at bedside.    Today is hospital day 2d.  Patient is doing fine. No acute events overnight.   Patient's appetite is adequate, and she is tolerating diet and denies nausea or vomiting.   Patient denies any abdominal pain.      Family History:  FAMILY HISTORY:  FH: kidney disease (Father)    FH: breast cancer (Mother)    FH: multiple myeloma (Mother)      Vital Signs in the last 24 hours   Vitals Summary T(C): 37.1 (12-08-23 @ 16:04), Max: 37.1 (12-08-23 @ 16:04)  HR: 71 (12-08-23 @ 16:04) (56 - 71)  BP: 135/64 (12-08-23 @ 16:04) (110/53 - 135/64)  RR: 18 (12-08-23 @ 16:04) (18 - 18)  SpO2: 95% (12-08-23 @ 16:04) (95% - 98%)  Vent Data   Intake/ Output   12-07-23 @ 07:01  -  12-08-23 @ 07:00  --------------------------------------------------------  IN: 418 mL / OUT: 900 mL / NET: -482 mL    12-08-23 @ 07:01  -  12-08-23 @ 20:35  --------------------------------------------------------  IN: 0 mL / OUT: 800 mL / NET: -800 mL        Physical Exam  * General Appearance: Alert, cooperative, interactive, oriented to time, place, and person, in no acute distress  * Lungs: Good bilateral air entry  * Heart: Regular Rate and Rhythm, systolic murmur along A area, no audible rub or gallop  * Abdomen: Symmetric, non-distended, soft, non-tender, bowel sounds active all four quadrants  * Rectal: refused in ED      Investigations   Laboratory Workup      - CBC:                        8.4    3.11  )-----------( 103      ( 08 Dec 2023 11:59 )             27.0       - Hgb Trend:  8.4  12-08-23 @ 11:59  7.8  12-08-23 @ 10:17  7.9  12-07-23 @ 23:40  7.4  12-07-23 @ 11:54  4.3  12-06-23 @ 17:52          - Chemistry:  12-07    143  |  111<H>  |  63<HH>  ----------------------------<  89  5.1<H>   |  23  |  2.6<H>    Ca    8.1<L>      07 Dec 2023 11:54    TPro  5.5<L>  /  Alb  3.1<L>  /  TBili  1.3<H>  /  DBili  x   /  AST  11  /  ALT  <5  /  AlkPhos  89  12-07    Liver panel trend:  TBili 1.3   /   AST 11   /   ALT <5   /   AlkP 89   /   Tptn 5.5   /   Alb 3.1    /   DBili --      12-07  TBili 0.5   /   AST 12   /   ALT <5   /   AlkP 87   /   Tptn 5.1   /   Alb 3.2    /   DBili --      12-06      - Coagulation Studies:  PT/INR - ( 07 Dec 2023 11:54 )   PT: 11.50 sec;   INR: 1.01 ratio         PTT - ( 07 Dec 2023 11:54 )  PTT:32.9 sec    - ABG:      - Cardiac Markers:  CARDIAC MARKERS ( 07 Dec 2023 11:54 )  x     / 0.01 ng/mL / x     / x     / x            Microbiological Workup  Urinalysis Basic - ( 07 Dec 2023 11:54 )    Color: x / Appearance: x / SG: x / pH: x  Gluc: 89 mg/dL / Ketone: x  / Bili: x / Urobili: x   Blood: x / Protein: x / Nitrite: x   Leuk Esterase: x / RBC: x / WBC x   Sq Epi: x / Non Sq Epi: x / Bacteria: x          Radiological Workup            Current Medications  Standing Medications  diphenhydrAMINE 25 milliGRAM(s) Oral at bedtime  folic acid 1 milliGRAM(s) Oral daily  furosemide    Tablet 40 milliGRAM(s) Oral daily  metoprolol tartrate 25 milliGRAM(s) Oral two times a day  pantoprazole  Injectable 40 milliGRAM(s) IV Push two times a day  sodium bicarbonate 650 milliGRAM(s) Oral every 8 hours    PRN Medications    Singles Doses Administered  (ADM OVERRIDE) 1 each &lt;see task&gt; GiveOnce  (ADM OVERRIDE) 1 each &lt;see task&gt; GiveOnce  (ADM OVERRIDE) 1 each &lt;see task&gt; GiveOnce  (ADM OVERRIDE) 1 each &lt;see task&gt; GiveOnce  (ADM OVERRIDE) 1 each &lt;see task&gt; GiveOnce  (ADM OVERRIDE) 1 each &lt;see task&gt; GiveOnce  (ADM OVERRIDE) 1 each &lt;see task&gt; GiveOnce  (ADM OVERRIDE) 2 each &lt;see task&gt; GiveOnce  (ADM OVERRIDE) 1 each &lt;see task&gt; GiveOnce  (ADM OVERRIDE) 3 each &lt;see task&gt; GiveOnce  furosemide   Injectable 40 milliGRAM(s) IV Push once  pantoprazole  Injectable 80 milliGRAM(s) IV Push Once     Gastroenterology Follow Up Note      Location: [address] ([address])  Patient Name: LATRICIA ARREAGA  Age: 78y  Gender: Female      Chief Complaint  Patient is a 78y old Female who presents with a chief complaint of Severe Anemia GI malformations (08 Dec 2023 13:18)  Primary diagnosis of Anemia      Reason for Consult  Anemia      Progress Note  This morning patient was seen and examined at bedside.    Today is hospital day 2d.  Patient is doing fine. No acute events overnight.   Patient's appetite is adequate, and she is tolerating diet and denies nausea or vomiting.   Patient denies any abdominal pain.      Family History:  FAMILY HISTORY:  FH: kidney disease (Father)    FH: breast cancer (Mother)    FH: multiple myeloma (Mother)      Vital Signs in the last 24 hours   Vitals Summary T(C): 37.1 (12-08-23 @ 16:04), Max: 37.1 (12-08-23 @ 16:04)  HR: 71 (12-08-23 @ 16:04) (56 - 71)  BP: 135/64 (12-08-23 @ 16:04) (110/53 - 135/64)  RR: 18 (12-08-23 @ 16:04) (18 - 18)  SpO2: 95% (12-08-23 @ 16:04) (95% - 98%)  Vent Data   Intake/ Output   12-07-23 @ 07:01  -  12-08-23 @ 07:00  --------------------------------------------------------  IN: 418 mL / OUT: 900 mL / NET: -482 mL    12-08-23 @ 07:01  -  12-08-23 @ 20:35  --------------------------------------------------------  IN: 0 mL / OUT: 800 mL / NET: -800 mL        Physical Exam  * General Appearance: Alert, cooperative, interactive, oriented to time, place, and person, in no acute distress  * Lungs: Good bilateral air entry  * Heart: Regular Rate and Rhythm, systolic murmur along A area, no audible rub or gallop  * Abdomen: Symmetric, non-distended, soft, non-tender, bowel sounds active all four quadrants  * Rectal: refused in ED      Investigations   Laboratory Workup      - CBC:                        8.4    3.11  )-----------( 103      ( 08 Dec 2023 11:59 )             27.0       - Hgb Trend:  8.4  12-08-23 @ 11:59  7.8  12-08-23 @ 10:17  7.9  12-07-23 @ 23:40  7.4  12-07-23 @ 11:54  4.3  12-06-23 @ 17:52          - Chemistry:  12-07    143  |  111<H>  |  63<HH>  ----------------------------<  89  5.1<H>   |  23  |  2.6<H>    Ca    8.1<L>      07 Dec 2023 11:54    TPro  5.5<L>  /  Alb  3.1<L>  /  TBili  1.3<H>  /  DBili  x   /  AST  11  /  ALT  <5  /  AlkPhos  89  12-07    Liver panel trend:  TBili 1.3   /   AST 11   /   ALT <5   /   AlkP 89   /   Tptn 5.5   /   Alb 3.1    /   DBili --      12-07  TBili 0.5   /   AST 12   /   ALT <5   /   AlkP 87   /   Tptn 5.1   /   Alb 3.2    /   DBili --      12-06      - Coagulation Studies:  PT/INR - ( 07 Dec 2023 11:54 )   PT: 11.50 sec;   INR: 1.01 ratio         PTT - ( 07 Dec 2023 11:54 )  PTT:32.9 sec    - ABG:      - Cardiac Markers:  CARDIAC MARKERS ( 07 Dec 2023 11:54 )  x     / 0.01 ng/mL / x     / x     / x            Microbiological Workup  Urinalysis Basic - ( 07 Dec 2023 11:54 )    Color: x / Appearance: x / SG: x / pH: x  Gluc: 89 mg/dL / Ketone: x  / Bili: x / Urobili: x   Blood: x / Protein: x / Nitrite: x   Leuk Esterase: x / RBC: x / WBC x   Sq Epi: x / Non Sq Epi: x / Bacteria: x          Radiological Workup            Current Medications  Standing Medications  diphenhydrAMINE 25 milliGRAM(s) Oral at bedtime  folic acid 1 milliGRAM(s) Oral daily  furosemide    Tablet 40 milliGRAM(s) Oral daily  metoprolol tartrate 25 milliGRAM(s) Oral two times a day  pantoprazole  Injectable 40 milliGRAM(s) IV Push two times a day  sodium bicarbonate 650 milliGRAM(s) Oral every 8 hours    PRN Medications    Singles Doses Administered  (ADM OVERRIDE) 1 each &lt;see task&gt; GiveOnce  (ADM OVERRIDE) 1 each &lt;see task&gt; GiveOnce  (ADM OVERRIDE) 1 each &lt;see task&gt; GiveOnce  (ADM OVERRIDE) 1 each &lt;see task&gt; GiveOnce  (ADM OVERRIDE) 1 each &lt;see task&gt; GiveOnce  (ADM OVERRIDE) 1 each &lt;see task&gt; GiveOnce  (ADM OVERRIDE) 1 each &lt;see task&gt; GiveOnce  (ADM OVERRIDE) 2 each &lt;see task&gt; GiveOnce  (ADM OVERRIDE) 1 each &lt;see task&gt; GiveOnce  (ADM OVERRIDE) 3 each &lt;see task&gt; GiveOnce  furosemide   Injectable 40 milliGRAM(s) IV Push once  pantoprazole  Injectable 80 milliGRAM(s) IV Push Once

## 2023-12-08 NOTE — DISCHARGE NOTE PROVIDER - ATTENDING DISCHARGE PHYSICAL EXAMINATION:
GENERAL: morbidly obese, elderly F, NAD, sitting up in bed eating lunch  EYES: anicteric sclera, non injected conjunctiva, EOMI  ENT: hearing grossly intact, oropharynx clear, MMM  PSYCH: no agitation, baseline mentation  NERVOUS SYSTEM:  Alert & Oriented X3   PULMONARY: symmetrical chest rise, no accessory muscle use  CARDIOVASCULAR: non tachycardia  GI: protuberant; deferred focused abdominal exam  EXTREMITIES:  No clubbing, cyanosis, or edema  SKIN: No rashes or lesions

## 2023-12-08 NOTE — DISCHARGE NOTE PROVIDER - NSDCMRMEDTOKEN_GEN_ALL_CORE_FT
diphenhydrAMINE 25 mg oral capsule: 1 orally once a day (at bedtime)  folic acid 1 mg oral tablet: 1 tab(s) orally once a day  furosemide 40 mg oral tablet: 1 tab(s) orally once a day  metoprolol tartrate 25 mg oral tablet: 1 tab(s) orally 2 times a day  pantoprazole 40 mg oral delayed release tablet: 1 tab(s) orally 2 times a day  sodium bicarbonate 650 mg oral tablet: 1 tab(s) orally every 8 hours

## 2023-12-08 NOTE — PROGRESS NOTE ADULT - SUBJECTIVE AND OBJECTIVE BOX
GIBSON LATRICIA  78y  Female      Patient is a 78y old  Female who presents with a chief complaint of Severe Anemia GI malformations (08 Dec 2023 13:18)      INTERVAL HPI/OVERNIGHT EVENTS: no acute events overnight. patient feeling "Ok" today. no reports of hematemesis or melena. good po intake.      REVIEW OF SYSTEMS:  CONSTITUTIONAL: No fever, weight loss, or fatigue  RESPIRATORY: No cough, wheezing, chills or hemoptysis; No shortness of breath  CARDIOVASCULAR: No chest pain, palpitations, dizziness, or leg swelling  GASTROINTESTINAL: No abdominal or epigastric pain. No nausea, vomiting, or hematemesis; No diarrhea or constipation. No melena or hematochezia.  GENITOURINARY: No dysuria, frequency, hematuria, or incontinence  NEUROLOGICAL: No headaches, memory loss, loss of strength, numbness, or tremors  SKIN: No itching, burning, rashes, or lesions   MUSCULOSKELETAL: No joint pain or swelling; No muscle, back, or extremity pain  PSYCHIATRIC: No depression, anxiety, mood swings, or difficulty sleeping  All other review of systems negative    T(C): 36.8 (12-08-23 @ 08:25), Max: 36.9 (12-08-23 @ 04:58)  HR: 56 (12-08-23 @ 08:25) (56 - 74)  BP: 112/54 (12-08-23 @ 08:25) (105/53 - 115/57)  RR: 18 (12-08-23 @ 08:25) (18 - 18)  SpO2: 95% (12-08-23 @ 08:25) (95% - 100%)  Wt(kg): --Vital Signs Last 24 Hrs  T(C): 36.8 (08 Dec 2023 08:25), Max: 36.9 (08 Dec 2023 04:58)  T(F): 98.2 (08 Dec 2023 08:25), Max: 98.4 (08 Dec 2023 04:58)  HR: 56 (08 Dec 2023 08:25) (56 - 74)  BP: 112/54 (08 Dec 2023 08:25) (105/53 - 115/57)  BP(mean): 82 (07 Dec 2023 17:05) (82 - 82)  RR: 18 (08 Dec 2023 08:25) (18 - 18)  SpO2: 95% (08 Dec 2023 08:25) (95% - 100%)    Parameters below as of 08 Dec 2023 08:25  Patient On (Oxygen Delivery Method): room air          12-07-23 @ 07:01  -  12-08-23 @ 07:00  --------------------------------------------------------  IN: 418 mL / OUT: 900 mL / NET: -482 mL        PHYSICAL EXAM:  GENERAL: morbidly obese, elderly F, NAD, sitting up in bed eating lunch  EYES: anicteric sclera, non injected conjunctiva, EOMI  ENT: hearing grossly intact, oropharynx clear, MMM  PSYCH: no agitation, baseline mentation  NERVOUS SYSTEM:  Alert & Oriented X3   PULMONARY: symmetrical chest rise, no accessory muscle use  CARDIOVASCULAR: non tachycardia  GI: protuberant; deferred focused abdominal exam  EXTREMITIES:  No clubbing, cyanosis, or edema  SKIN: No rashes or lesions    Consultant(s) Notes Reviewed:  [x ] YES  [ ] NO    Discussed with Consultants/Other Providers [ x] YES     LABS                          8.4    3.11  )-----------( 103      ( 08 Dec 2023 11:59 )             27.0     12-07    143  |  111<H>  |  63<HH>  ----------------------------<  89  5.1<H>   |  23  |  2.6<H>    Ca    8.1<L>      07 Dec 2023 11:54    TPro  5.5<L>  /  Alb  3.1<L>  /  TBili  1.3<H>  /  DBili  x   /  AST  11  /  ALT  <5  /  AlkPhos  89  12-07      Urinalysis Basic - ( 07 Dec 2023 11:54 )    Color: x / Appearance: x / SG: x / pH: x  Gluc: 89 mg/dL / Ketone: x  / Bili: x / Urobili: x   Blood: x / Protein: x / Nitrite: x   Leuk Esterase: x / RBC: x / WBC x   Sq Epi: x / Non Sq Epi: x / Bacteria: x      PT/INR - ( 07 Dec 2023 11:54 )   PT: 11.50 sec;   INR: 1.01 ratio      PTT - ( 07 Dec 2023 11:54 )  PTT:32.9 sec      CARDIAC MARKERS ( 07 Dec 2023 11:54 )  x     / 0.01 ng/mL / x     / x     / x      CARDIAC MARKERS ( 06 Dec 2023 17:52 )  x     / 0.02 ng/mL / x     / x     / x          RADIOLOGY & ADDITIONAL TESTS:  - no images on 12/8 at this time  Imaging Personally Reviewed:  [ ] YES  [ ] NO    HEALTH ISSUES - PROBLEM Dx:      MEDICATIONS  (STANDING):  diphenhydrAMINE 25 milliGRAM(s) Oral at bedtime  folic acid 1 milliGRAM(s) Oral daily  furosemide    Tablet 40 milliGRAM(s) Oral daily  metoprolol tartrate 25 milliGRAM(s) Oral two times a day  pantoprazole  Injectable 40 milliGRAM(s) IV Push two times a day  sodium bicarbonate 650 milliGRAM(s) Oral every 8 hours    MEDICATIONS  (PRN):

## 2023-12-08 NOTE — PROGRESS NOTE ADULT - ATTENDING COMMENTS
EGD/enteroscopy revealing gastric and duodenal AVMs s/p APC. No evidence of overt GI bleeding currently and Hb stable. CTA not done due to renal dysfctn. Await US with doppler. Discussed with team.

## 2023-12-08 NOTE — PROGRESS NOTE ADULT - ASSESSMENT
78-year-old female history of anemia 2/2 CKD and chronic upper GIB due to AV malformations and gastric body dieulafoy lesion, severe aortic stenosis pending TAVR, presenting to ER for evaluation.    #Acute on Chronic Anemia  #Hx of AV malformations, gastric body dieulafoy lesions  #Severe AS  - Hg 4.3 on admission-->s/p 4u pRBC-->8-9 and stable  - Cardio eval and risk stratification appreciated  - Ordered for 4U PRBC transfusion today to keep hg >8  - cont home lasix 40mg qD  - GI following; s/p EGD on 12/7        Stomach: Two small non-bleeding localized angioectasias were seen in the body. The angioectasia(s) was ablated completely. Hemostasis was performed. Erosions of the mucosa was noted in the stomach. These findings are compatible with erosive gastritis.        Duodenum: Two small non-bleeding localized angioectasias were seen in the fourth portion of the duodenum. The angioectasia(s) was ablated completely. Hemostasis was performed.  - Cont Metoprolol 25 BID      #CKD stage 4  - creat at baseline 2.5  - currently stable  - monitor on serial BMP    #Hx of diverticulosis  - Avoid constipation    DVT PPX, SCD    #Progress Note Handoff  Pending (specify): GI follow up. per discussion with fellow, GI may want CT angio vs abdominal dupplex. awaiting final recs  Family discussion: tito pt regarding pending procedure  Disposition: Home. likely tomorrow

## 2023-12-08 NOTE — DISCHARGE NOTE PROVIDER - CARE PROVIDER_API CALL
Gildardo Bill  Internal Medicine  [address] [place] [address]  Phone: [phone]  Fax: [phone]  Follow Up Time: 2 weeks    Reji Hernandez  Interventional Cardiology  [address] [place] [address], [address]  [place] [address]  Phone: [phone]  Fax: [phone]  Follow Up Time: 2 weeks    Liu Gaston  Medical Oncology  [address] [place] [address]  Phone: [phone]  Fax: [phone]  Follow Up Time: 1 week    Shazia Gardner  Gastroenterology  [address] [place] [address]  Phone: [phone]  Fax: [phone]  Follow Up Time: 2 weeks   Gildardo Bill  Internal Medicine  [address] [place] [address]  Phone: [phone]  Fax: [phone]  Follow Up Time: 2 weeks    Reji Hernandez  Interventional Cardiology  [address] [place] [address], [address]  [place] [address]  Phone: [phone]  Fax: [phone]  Follow Up Time: 2 weeks    Liu Gaston  Medical Oncology  [address] [place] [address]  Phone: [phone]  Fax: [phone]  Follow Up Time: 1 week    Shazia Gardner  Gastroenterology  [address] [place] [address]  Phone: [phone]  Fax: [phone]  Follow Up Time: 2 weeks

## 2023-12-08 NOTE — DISCHARGE NOTE PROVIDER - NSDCCAREPROVSEEN_GEN_ALL_CORE_FT
Medicine  [facility] Medicine  Gastroenterology  Cardiology  Oncology/Hematology  [facility] Medicine  Gastroenterology  Cardiology  Oncology/Hematology  [facility]

## 2023-12-08 NOTE — DISCHARGE NOTE PROVIDER - NSDCFUSCHEDAPPT_GEN_ALL_CORE_FT
Liu Gaston  [facility] PreAdmits  Scheduled Appointment: 12/15/2023    [facility] Physician [facility]  AMBCHEMO SI [address]  Scheduled Appointment: 12/15/2023    [facility]  AMBCHEMO SI [address]  Scheduled Appointment: 12/22/2023    Liu Gaston  [facility] PreAdmits  Scheduled Appointment: 12/22/2023    Liu Gaston  [facility] PreAdmits  Scheduled Appointment: 12/29/2023    [facility]  AMBCHEMO [address]  Scheduled Appointment: 12/29/2023    [facility]  AMBCHEMO SI [address]  Scheduled Appointment: 01/05/2024    Liu Gaston  [facility] PreAdmits  Scheduled Appointment: 01/05/2024     Liu Gaston  [facility] PreAdmits  Scheduled Appointment: 12/15/2023    [facility] Physician [facility]  AMBCHEMO SI [address]  Scheduled Appointment: 12/15/2023    [facility]  AMBCHEMO SI [address]  Scheduled Appointment: 12/22/2023    Liu Gaston  [facility] PreAdmits  Scheduled Appointment: 12/22/2023    Liu Gaston  [facility] PreAdmits  Scheduled Appointment: 12/29/2023    [facility]  AMBCHEMO SI [address]  Scheduled Appointment: 12/29/2023    [facility]  AMBCHEMO SI [address]  Scheduled Appointment: 01/05/2024    Liu Gaston  [facility] PreAdmits  Scheduled Appointment: 01/05/2024

## 2023-12-08 NOTE — DISCHARGE NOTE PROVIDER - HOSPITAL COURSE
HPI:  78-year-old female history of anemia 2/2 CKD and chronic upper GIB due to AV malformations and gastric body dieulafoy lesion, severe aortic stenosis pending TAVR, presenting to ER for evaluation.  Patient  has weekly blood transfusions with Dr. Gaston (last one 5 days ago).  is supposed to have blood work done tomorrow but cannot wait.   is now feeling very weak with associated shortness of breath and mild chest tightness.  Symptoms are similar to prior anemia requiring blood transfusion.  She denies any recent dark or tarry stool, bright red blood per rectum, abdominal pain, nausea, vomiting, fever, chills, syncope  Refused OLEKSANDR    In the ED: BP: 116/53. HR: 78, RR: 20, Temp; 97.9, SpO2: 100 on RA    Labs:  -> WBC: 2.4 baseline, H.3 (baseline ~8), MCV: 94.8, plt: 101  -> Na: 142, K: 4.6, BUN: 65, crea: 2.4 at baseline  -> Trop: 0.02, BNP: 2782    s/p Pantoprazole 80 IV once and 3 u PRBC ordered (06 Dec 2023 23:11)    Followed by a 4th pack    Hb went up to 8.4    Cardiac risk stratification in the setting of  Severe AS, Urgent procedure.  IV Lasix 40mg stat  c/w PO lasix 40mg qd  c/w lopressor 25 BID  check EKG after PRBCs transfusion. Keep target Hb>8  pt is at intermediate to high risk of MACE for low risk procedure given severe AS and poor functional status  avoid rapid fluid shifts, monitor pre-load, use Sukhjinder prn  outpatient f/u with Dr Hernandez  No further cardiac work-up is needed at the moment. There are no current cardiac contraindications to prevent from proceeding with the scheduled surgery/procedure.    - This consult serves only as a estuardo-operative cardiac risk stratification and evaluation to predict 30-days cardiac complications risk and mortality. The decision to proceed with the surgery/procedure is made by the performing physician and the patient - .    Endoscopy  Esophagus: Normal    Stomach: Two small non-bleeding localized angioectasias were seen in the body. The angioectasia(s) was ablated completely. Hemostasis was performed. The previously placed endoclip in the stomach was seen. 	Erosions of the mucosa was noted in the stomach. These findings are compatible with erosive gastritis.    Duodenum: Two small non-bleeding localized angioectasias were seen in the fourth portion of the duodenum. The angioectasia(s) was ablated completely. Hemostasis was performed.    RECOMMENDATIONS  - Recommend abdominal imaging in setting of history of perisplenic varices   - Continue Pantoprazole 40 IV BID  - Monitor BM: no melena or hematochezia   - Please avoid any NSAIDs  - Hematology follow up: follows with Dr Gaston as outpatient             HPI:  78-year-old female history of anemia 2/2 CKD and chronic upper GIB due to AV malformations and gastric body dieulafoy lesion, severe aortic stenosis pending TAVR, presenting to ER for evaluation.  Patient  has weekly blood transfusions with Dr. Gaston (last one 5 days ago).  is supposed to have blood work done tomorrow but cannot wait.   is now feeling very weak with associated shortness of breath and mild chest tightness.  Symptoms are similar to prior anemia requiring blood transfusion.  She denies any recent dark or tarry stool, bright red blood per rectum, abdominal pain, nausea, vomiting, fever, chills, syncope  Refused OLEKSANDR    In the ED: BP: 116/53. HR: 78, RR: 20, Temp; 97.9, SpO2: 100 on RA    Labs:  -> WBC: 2.4 baseline, H.3 (baseline ~8), MCV: 94.8, plt: 101  -> Na: 142, K: 4.6, BUN: 65, crea: 2.4 at baseline  -> Trop: 0.02, BNP: 2782    s/p Pantoprazole 80 IV once and 3 u PRBC ordered (06 Dec 2023 23:11)    Followed by a 4th pack    Hb went up to 8.4    Cardiac risk stratification in the setting of  Severe AS, Urgent procedure.  IV Lasix 40mg stat  c/w PO lasix 40mg qd  c/w lopressor 25 BID  check EKG after PRBCs transfusion. Keep target Hb>8  pt is at intermediate to high risk of MACE for low risk procedure given severe AS and poor functional status  avoid rapid fluid shifts, monitor pre-load, use Sukhjinder prn  outpatient f/u with Dr Hernandez  No further cardiac work-up is needed at the moment. There are no current cardiac contraindications to prevent from proceeding with the scheduled surgery/procedure.    - This consult serves only as a estuardo-operative cardiac risk stratification and evaluation to predict 30-days cardiac complications risk and mortality. The decision to proceed with the surgery/procedure is made by the performing physician and the patient - .    Endoscopy  Esophagus: Normal  Stomach: Two small non-bleeding localized angioectasias were seen in the body. The angioectasia(s) was ablated completely. Hemostasis was performed. The previously placed endoclip in the stomach was seen. 	Erosions of the mucosa was noted in the stomach. These findings are compatible with erosive gastritis.  Duodenum: Two small non-bleeding localized angioectasias were seen in the fourth portion of the duodenum. The angioectasia(s) was ablated completely. Hemostasis was performed.  RECOMMENDATIONS  - Recommend abdominal imaging in setting of history of perisplenic varices   - Continue Pantoprazole 40 IV BID  - Monitor BM: no melena or hematochezia   - Please avoid any NSAIDs  - Hematology follow up: follows with Dr Gaston as outpatient    Duplex Abdominale:  IMPRESSION:  1.  Normal sonographic appearance of the liver.  2.  Normal hepatic duplex.  3.  Surgically absent gallbladder.  4.  Right pleural effusion.    #Hx of AV malformations, gastric body dieulafoy lesions  #Severe AS  - Hg 4.3 on admission-->s/p 4u pRBC-->8-9-->today 6.7; unsure if real. pending repeat  - Cardio eval and risk stratification appreciated  - Ordered for 4U PRBC transfusion today to keep hg >8  - cont home lasix 40mg qD  - GI following; s/p EGD on         Stomach: Two small non-bleeding localized angioectasias were seen in the body. The angioectasia(s) was ablated completely. Hemostasis was performed. Erosions of the mucosa was noted in the stomach. These findings are compatible with erosive gastritis.        Duodenum: Two small non-bleeding localized angioectasias were seen in the fourth portion of the duodenum. The angioectasia(s) was ablated completely. Hemostasis was performed.  - Cont Metoprolol 25 BID  - abdominal dupplex as above  - CBC stable at 7.8-8.4 for 3 sets pre DC      #CKD stage 4  - creat at baseline 2.5  - currently stable  - monitor on serial BMP    #Hx of diverticulosis  - Avoid constipation

## 2023-12-08 NOTE — DISCHARGE NOTE PROVIDER - CARE PROVIDERS DIRECT ADDRESSES
[email or web address],[email or web address] ,[email or web address],DirectAddress_Unknown

## 2023-12-09 ENCOUNTER — TRANSCRIPTION ENCOUNTER (OUTPATIENT)
Age: 78
End: 2023-12-09

## 2023-12-09 VITALS
SYSTOLIC BLOOD PRESSURE: 131 MMHG | OXYGEN SATURATION: 92 % | DIASTOLIC BLOOD PRESSURE: 57 MMHG | TEMPERATURE: 98 F | RESPIRATION RATE: 19 BRPM | HEART RATE: 68 BPM

## 2023-12-09 LAB
HCT VFR BLD CALC: 19.8 % — LOW (ref 37–47)
HCT VFR BLD CALC: 19.8 % — LOW (ref 37–47)
HCT VFR BLD CALC: 25.3 % — LOW (ref 37–47)
HCT VFR BLD CALC: 25.3 % — LOW (ref 37–47)
HGB BLD-MCNC: 6.2 G/DL — CRITICAL LOW (ref 12–16)
HGB BLD-MCNC: 6.2 G/DL — CRITICAL LOW (ref 12–16)
HGB BLD-MCNC: 8 G/DL — LOW (ref 12–16)
HGB BLD-MCNC: 8 G/DL — LOW (ref 12–16)
MCHC RBC-ENTMCNC: 28.6 PG — SIGNIFICANT CHANGE UP (ref 27–31)
MCHC RBC-ENTMCNC: 28.6 PG — SIGNIFICANT CHANGE UP (ref 27–31)
MCHC RBC-ENTMCNC: 28.7 PG — SIGNIFICANT CHANGE UP (ref 27–31)
MCHC RBC-ENTMCNC: 28.7 PG — SIGNIFICANT CHANGE UP (ref 27–31)
MCHC RBC-ENTMCNC: 31.3 G/DL — LOW (ref 32–37)
MCHC RBC-ENTMCNC: 31.3 G/DL — LOW (ref 32–37)
MCHC RBC-ENTMCNC: 31.6 G/DL — LOW (ref 32–37)
MCHC RBC-ENTMCNC: 31.6 G/DL — LOW (ref 32–37)
MCV RBC AUTO: 90.7 FL — SIGNIFICANT CHANGE UP (ref 81–99)
MCV RBC AUTO: 90.7 FL — SIGNIFICANT CHANGE UP (ref 81–99)
MCV RBC AUTO: 91.2 FL — SIGNIFICANT CHANGE UP (ref 81–99)
MCV RBC AUTO: 91.2 FL — SIGNIFICANT CHANGE UP (ref 81–99)
NRBC # BLD: 0 /100 WBCS — SIGNIFICANT CHANGE UP (ref 0–0)
PLATELET # BLD AUTO: 100 K/UL — LOW (ref 130–400)
PLATELET # BLD AUTO: 100 K/UL — LOW (ref 130–400)
PLATELET # BLD AUTO: 74 K/UL — LOW (ref 130–400)
PLATELET # BLD AUTO: 74 K/UL — LOW (ref 130–400)
PMV BLD: 10.4 FL — SIGNIFICANT CHANGE UP (ref 7.4–10.4)
PMV BLD: 10.4 FL — SIGNIFICANT CHANGE UP (ref 7.4–10.4)
PMV BLD: 9.7 FL — SIGNIFICANT CHANGE UP (ref 7.4–10.4)
PMV BLD: 9.7 FL — SIGNIFICANT CHANGE UP (ref 7.4–10.4)
RBC # BLD: 2.17 M/UL — LOW (ref 4.2–5.4)
RBC # BLD: 2.17 M/UL — LOW (ref 4.2–5.4)
RBC # BLD: 2.79 M/UL — LOW (ref 4.2–5.4)
RBC # BLD: 2.79 M/UL — LOW (ref 4.2–5.4)
RBC # FLD: 15.9 % — HIGH (ref 11.5–14.5)
RBC # FLD: 15.9 % — HIGH (ref 11.5–14.5)
RBC # FLD: 16 % — HIGH (ref 11.5–14.5)
RBC # FLD: 16 % — HIGH (ref 11.5–14.5)
WBC # BLD: 2.66 K/UL — LOW (ref 4.8–10.8)
WBC # BLD: 2.66 K/UL — LOW (ref 4.8–10.8)
WBC # BLD: 3.53 K/UL — LOW (ref 4.8–10.8)
WBC # BLD: 3.53 K/UL — LOW (ref 4.8–10.8)
WBC # FLD AUTO: 2.66 K/UL — LOW (ref 4.8–10.8)
WBC # FLD AUTO: 2.66 K/UL — LOW (ref 4.8–10.8)
WBC # FLD AUTO: 3.53 K/UL — LOW (ref 4.8–10.8)
WBC # FLD AUTO: 3.53 K/UL — LOW (ref 4.8–10.8)

## 2023-12-09 PROCEDURE — 76705 ECHO EXAM OF ABDOMEN: CPT | Mod: 26,XU

## 2023-12-09 PROCEDURE — 93975 VASCULAR STUDY: CPT | Mod: 26

## 2023-12-09 PROCEDURE — 99239 HOSP IP/OBS DSCHRG MGMT >30: CPT

## 2023-12-09 RX ORDER — FOLIC ACID 0.8 MG
1 TABLET ORAL
Qty: 30 | Refills: 1
Start: 2023-12-09 | End: 2024-02-06

## 2023-12-09 RX ORDER — FOLIC ACID 0.8 MG
1 TABLET ORAL
Qty: 0 | Refills: 0 | DISCHARGE

## 2023-12-09 RX ADMIN — Medication 25 MILLIGRAM(S): at 05:44

## 2023-12-09 RX ADMIN — Medication 650 MILLIGRAM(S): at 05:44

## 2023-12-09 RX ADMIN — Medication 1 MILLIGRAM(S): at 11:47

## 2023-12-09 RX ADMIN — PANTOPRAZOLE SODIUM 40 MILLIGRAM(S): 20 TABLET, DELAYED RELEASE ORAL at 05:44

## 2023-12-09 RX ADMIN — Medication 100 UNIT(S): at 17:09

## 2023-12-09 RX ADMIN — Medication 40 MILLIGRAM(S): at 05:44

## 2023-12-09 NOTE — PROGRESS NOTE ADULT - SUBJECTIVE AND OBJECTIVE BOX
LATRICIA ARREAGA  78y  Female      Patient is a 78y old  Female who presents with a chief complaint of Severe Anemia GI malformations (08 Dec 2023 13:18)      INTERVAL HPI/OVERNIGHT EVENTS: no acute events overnight. no hematochezia or melena       REVIEW OF SYSTEMS:  CONSTITUTIONAL: No fever, weight loss, or fatigue  RESPIRATORY: No cough, wheezing, chills or hemoptysis; No shortness of breath  CARDIOVASCULAR: No chest pain, palpitations, dizziness, or leg swelling  GASTROINTESTINAL: No abdominal or epigastric pain. No nausea, vomiting, or hematemesis; No diarrhea or constipation. No melena or hematochezia.  GENITOURINARY: No dysuria, frequency, hematuria, or incontinence  NEUROLOGICAL: No headaches, memory loss, loss of strength, numbness, or tremors  SKIN: No itching, burning, rashes, or lesions   MUSCULOSKELETAL: No joint pain or swelling; No muscle, back, or extremity pain  PSYCHIATRIC: No depression, anxiety, mood swings, or difficulty sleeping  All other review of systems negative    T(C): 36.9 (12-09-23 @ 07:30), Max: 37.1 (12-08-23 @ 16:04)  HR: 68 (12-09-23 @ 07:30) (67 - 71)  BP: 131/57 (12-09-23 @ 07:30) (110/70 - 135/64)  RR: 19 (12-09-23 @ 07:30) (18 - 19)  SpO2: 92% (12-09-23 @ 07:30) (91% - 95%)  Wt(kg): --Vital Signs Last 24 Hrs  T(C): 36.9 (09 Dec 2023 07:30), Max: 37.1 (08 Dec 2023 16:04)  T(F): 98.5 (09 Dec 2023 07:30), Max: 98.7 (08 Dec 2023 16:04)  HR: 68 (09 Dec 2023 07:30) (67 - 71)  BP: 131/57 (09 Dec 2023 07:30) (110/70 - 135/64)  BP(mean): --  RR: 19 (09 Dec 2023 07:30) (18 - 19)  SpO2: 92% (09 Dec 2023 07:30) (91% - 95%)    Parameters below as of 09 Dec 2023 07:30  Patient On (Oxygen Delivery Method): room air          12-08-23 @ 07:01  -  12-09-23 @ 07:00  --------------------------------------------------------  IN: 0 mL / OUT: 1600 mL / NET: -1600 mL    12-09-23 @ 07:01  -  12-09-23 @ 13:52  --------------------------------------------------------  IN: 418 mL / OUT: 0 mL / NET: 418 mL        PHYSICAL EXAM:  GENERAL: morbidly obese, elderly F, NAD, sitting up in bed eating lunch  EYES: anicteric sclera, non injected conjunctiva, EOMI  ENT: hearing grossly intact, oropharynx clear, MMM  PSYCH: no agitation, baseline mentation  NERVOUS SYSTEM:  Alert & Oriented X3   PULMONARY: symmetrical chest rise, no accessory muscle use  CARDIOVASCULAR: non tachycardia  GI: protuberant; deferred focused abdominal exam  EXTREMITIES:  No clubbing, cyanosis, or edema  SKIN: No rashes or lesions    Consultant(s) Notes Reviewed:  [x ] YES  [ ] NO    Discussed with Consultants/Other Providers [ x] YES     LABS                          6.2    2.66  )-----------( 74       ( 09 Dec 2023 12:05 )             19.8     RADIOLOGY & ADDITIONAL TESTS:  US Abdomen Upper Quadrant Right (12.09.23 @ 09:38) >  IMPRESSION:  1.  Normal sonographic appearance of the liver.  2.  Normal hepatic duplex.  3.  Surgically absent gallbladder.  4.  Right pleural effusion.      Imaging Personally Reviewed:  [ ] YES  [x ] NO    HEALTH ISSUES - PROBLEM Dx:    MEDICATIONS  (STANDING):  diphenhydrAMINE 25 milliGRAM(s) Oral at bedtime  folic acid 1 milliGRAM(s) Oral daily  furosemide    Tablet 40 milliGRAM(s) Oral daily  metoprolol tartrate 25 milliGRAM(s) Oral two times a day  pantoprazole  Injectable 40 milliGRAM(s) IV Push two times a day  sodium bicarbonate 650 milliGRAM(s) Oral every 8 hours    MEDICATIONS  (PRN):

## 2023-12-09 NOTE — DISCHARGE NOTE NURSING/CASE MANAGEMENT/SOCIAL WORK - PATIENT PORTAL LINK FT
You can access the FollowMyHealth Patient Portal offered by [facility] by registering at the following website: [email or web address]. By joining [employer]’s FollowMyHealth portal, you will also be able to view your health information using other applications (apps) compatible with our system. You can access the FollowMyHealth Patient Portal offered by [facility] by registering at the following website: [email or web address]. By joining [employer]’s FollowMyHealth portal, you will also be able to view your health information using other applications (apps) compatible with our system.

## 2023-12-09 NOTE — DISCHARGE NOTE NURSING/CASE MANAGEMENT/SOCIAL WORK - NSDCPEFALRISK_GEN_ALL_CORE
For information on Fall & Injury Prevention, visit: [email or web address] OR  [email or web address] OR  https://www.cdc.gov/steadi/patient.html For information on Fall & Injury Prevention, visit: [email or web address] OR  [email or web address] OR  https://www.cdc.gov/steadi/patient.html

## 2023-12-09 NOTE — PROGRESS NOTE ADULT - ASSESSMENT
78-year-old female history of anemia 2/2 CKD and chronic upper GIB due to AV malformations and gastric body dieulafoy lesion, severe aortic stenosis pending TAVR, presenting to ER for evaluation.    #Acute on Chronic Anemia  #Hx of AV malformations, gastric body dieulafoy lesions  #Severe AS  - Hg 4.3 on admission-->s/p 4u pRBC-->8-9-->today 6.7; unsure if real. pending repeat  - Cardio eval and risk stratification appreciated  - Ordered for 4U PRBC transfusion today to keep hg >8  - cont home lasix 40mg qD  - GI following; s/p EGD on 12/7        Stomach: Two small non-bleeding localized angioectasias were seen in the body. The angioectasia(s) was ablated completely. Hemostasis was performed. Erosions of the mucosa was noted in the stomach. These findings are compatible with erosive gastritis.        Duodenum: Two small non-bleeding localized angioectasias were seen in the fourth portion of the duodenum. The angioectasia(s) was ablated completely. Hemostasis was performed.  - Cont Metoprolol 25 BID  - abdominal dupplex as above  - pending CBC      #CKD stage 4  - creat at baseline 2.5  - currently stable  - monitor on serial BMP    #Hx of diverticulosis  - Avoid constipation    DVT PPX, SCD    #Progress Note Handoff  Pending (specify): GI follow up. per discussion with fellow, GI may want CT angio vs abdominal dupplex. awaiting final recs  Family discussion: n/c  Disposition: patient would like to leave today but it would be AMA if hgb is truly under 7

## 2023-12-11 ENCOUNTER — NON-APPOINTMENT (OUTPATIENT)
Age: 78
End: 2023-12-11

## 2023-12-11 LAB
MANUAL DIF COMMENT BLD-IMP: SIGNIFICANT CHANGE UP
MANUAL DIF COMMENT BLD-IMP: SIGNIFICANT CHANGE UP

## 2023-12-15 ENCOUNTER — APPOINTMENT (OUTPATIENT)
Dept: INFUSION THERAPY | Facility: CLINIC | Age: 78
End: 2023-12-15

## 2023-12-15 ENCOUNTER — OUTPATIENT (OUTPATIENT)
Dept: OUTPATIENT SERVICES | Facility: HOSPITAL | Age: 78
LOS: 1 days | End: 2023-12-15
Payer: MEDICARE

## 2023-12-15 VITALS — SYSTOLIC BLOOD PRESSURE: 114 MMHG | HEART RATE: 56 BPM | DIASTOLIC BLOOD PRESSURE: 74 MMHG | TEMPERATURE: 98 F

## 2023-12-15 DIAGNOSIS — E87.5 HYPERKALEMIA: ICD-10-CM

## 2023-12-15 DIAGNOSIS — Z98.891 HISTORY OF UTERINE SCAR FROM PREVIOUS SURGERY: Chronic | ICD-10-CM

## 2023-12-15 DIAGNOSIS — Z90.49 ACQUIRED ABSENCE OF OTHER SPECIFIED PARTS OF DIGESTIVE TRACT: Chronic | ICD-10-CM

## 2023-12-15 DIAGNOSIS — Z87.19 PERSONAL HISTORY OF OTHER DISEASES OF THE DIGESTIVE SYSTEM: Chronic | ICD-10-CM

## 2023-12-15 DIAGNOSIS — Z95.828 PRESENCE OF OTHER VASCULAR IMPLANTS AND GRAFTS: Chronic | ICD-10-CM

## 2023-12-15 DIAGNOSIS — Z98.890 OTHER SPECIFIED POSTPROCEDURAL STATES: Chronic | ICD-10-CM

## 2023-12-15 PROCEDURE — P9040: CPT

## 2023-12-15 PROCEDURE — 96372 THER/PROPH/DIAG INJ SC/IM: CPT

## 2023-12-15 PROCEDURE — 36430 TRANSFUSION BLD/BLD COMPNT: CPT

## 2023-12-15 PROCEDURE — 96365 THER/PROPH/DIAG IV INF INIT: CPT

## 2023-12-15 RX ORDER — ERYTHROPOIETIN 10000 [IU]/ML
30000 INJECTION, SOLUTION INTRAVENOUS; SUBCUTANEOUS ONCE
Refills: 0 | Status: COMPLETED | OUTPATIENT
Start: 2023-12-15 | End: 2023-12-15

## 2023-12-15 RX ORDER — IRON SUCROSE 20 MG/ML
200 INJECTION, SOLUTION INTRAVENOUS ONCE
Refills: 0 | Status: COMPLETED | OUTPATIENT
Start: 2023-12-15 | End: 2023-12-15

## 2023-12-15 RX ADMIN — IRON SUCROSE 220 MILLIGRAM(S): 20 INJECTION, SOLUTION INTRAVENOUS at 12:57

## 2023-12-15 RX ADMIN — ERYTHROPOIETIN 30000 UNIT(S): 10000 INJECTION, SOLUTION INTRAVENOUS; SUBCUTANEOUS at 12:57

## 2023-12-15 RX ADMIN — IRON SUCROSE 200 MILLIGRAM(S): 20 INJECTION, SOLUTION INTRAVENOUS at 13:30

## 2023-12-20 RX ORDER — SODIUM ZIRCONIUM CYCLOSILICATE 10 G/10G
10 POWDER, FOR SUSPENSION ORAL
Qty: 1 | Refills: 0 | Status: ACTIVE | COMMUNITY
Start: 2023-09-26 | End: 1900-01-01

## 2023-12-20 NOTE — CDI QUERY NOTE - NSCDIOTHERTXTBX_GEN_ALL_CORE_HH
Based on the following clinical indicators and your professional judgment, can the acute on chronic anemia be further clarified?    -Acute on chronic anemia associated with blood loss from chronic bleeding AVMs & erosive gastritis, could not be ruled out at the time of discharge  -Acute on chronic anemia associated with blood loss from chronic bleeding AVMs, could not be ruled out at the time of discharge  -Acute on chronic anemia associated with blood loss from chronic erosive gastritis, could not be ruled out at the time of discharge  -Acute on chronic anemia not associated with GI bleed  -Other, please specify:  -Clinically unable to further clarify acute on chronic anemia    CLINICAL INDICATORS:    12/6 H&P Adult- Assessment: 78-YOF… history of anemia 2/2 CKD, chronic 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 similar to prior anemia requiring blood transfusion… denies recent dark or tarry stool, bright red blood per rectum, abdominal pain… #Acute on chronic normocytic anemia s/p 3u pRBC; - Prior Colonoscopy: 10/2019: negative EGD, anemia workup… moderate diverticulosis in SC, DC, cecum; internal hemorrhoids    12/9 Progress Note Adult-Hospitalist Attending - Assessment: #Acute on Chronic Anemia;      12/8 Discharge note provider- Hg 4.3 on admission-->s/p 4u pRBC; PRINCIPAL DISCHARGE DIAGNOSIS: Severe anemia... The cause of your anemia may or may not be determined immediately.    12/7 EGD- Stomach: Two small non-bleeding localized angioectasias were seen in the body. The angioectasia(s) was ablated completely; Duodenum: Two small non-bleeding localized angioectasias were seen in the fourth portion of the duodenum. The angioectasia(s) was ablated completely erosions in the stomach compatible with erosive gastritis; recommend octreotide and Protonix; need repair of severe aortic stenosis    12/9 Duplex Abdominale: IMPRESSION: 1.  Normal sonographic appearance of the liver. 2.  Normal hepatic duplex. 3.  Surgically absent gallbladder.     Protonix 80mg IVP once, then 40mg IVP BID administered 12/6- to 12/9    Thank you,  Chandni Guillermo, RN  [phone] Based on the following clinical indicators and your professional judgment, can the acute on chronic anemia be further clarified?    -Acute on chronic anemia associated with blood loss from chronic bleeding AVMs & erosive gastritis, could not be ruled out at the time of discharge  -Acute on chronic anemia associated with blood loss from chronic bleeding AVMs, could not be ruled out at the time of discharge  -Acute on chronic anemia associated with blood loss from chronic erosive gastritis, could not be ruled out at the time of discharge  -Acute on chronic anemia not associated with GI bleed  -Other, please specify:  -Clinically unable to further clarify acute on chronic anemia    CLINICAL INDICATORS:    12/6 H&P Adult- Assessment: 78-YOF… history of anemia 2/2 CKD, chronic 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 similar to prior anemia requiring blood transfusion… denies recent dark or tarry stool, bright red blood per rectum, abdominal pain… #Acute on chronic normocytic anemia s/p 3u pRBC; - Prior Colonoscopy: 10/2019: negative EGD, anemia workup… moderate diverticulosis in SC, DC, cecum; internal hemorrhoids    12/9 Progress Note Adult-Hospitalist Attending - Assessment: #Acute on Chronic Anemia;      12/8 Discharge note provider- Hg 4.3 on admission-->s/p 4u pRBC; PRINCIPAL DISCHARGE DIAGNOSIS: Severe anemia... The cause of your anemia may or may not be determined immediately.    12/7 EGD- Stomach: Two small non-bleeding localized angioectasias were seen in the body. The angioectasia(s) was ablated completely; Duodenum: Two small non-bleeding localized angioectasias were seen in the fourth portion of the duodenum. The angioectasia(s) was ablated completely erosions in the stomach compatible with erosive gastritis; recommend octreotide and Protonix; need repair of severe aortic stenosis    12/9 Duplex Abdominale: IMPRESSION: 1.  Normal sonographic appearance of the liver. 2.  Normal hepatic duplex. 3.  Surgically absent gallbladder.     Protonix 80mg IVP once, then 40mg IVP BID administered 12/6- to 12/9    Thank you,  Chandni Guillermo, RN  [phone]

## 2023-12-22 ENCOUNTER — APPOINTMENT (OUTPATIENT)
Dept: INFUSION THERAPY | Facility: CLINIC | Age: 78
End: 2023-12-22

## 2023-12-29 ENCOUNTER — APPOINTMENT (OUTPATIENT)
Dept: INFUSION THERAPY | Facility: CLINIC | Age: 78
End: 2023-12-29

## 2023-12-29 ENCOUNTER — OUTPATIENT (OUTPATIENT)
Dept: OUTPATIENT SERVICES | Facility: HOSPITAL | Age: 78
LOS: 1 days | End: 2023-12-29
Payer: MEDICARE

## 2023-12-29 ENCOUNTER — NON-APPOINTMENT (OUTPATIENT)
Age: 78
End: 2023-12-29

## 2023-12-29 VITALS — DIASTOLIC BLOOD PRESSURE: 51 MMHG | HEART RATE: 69 BPM | SYSTOLIC BLOOD PRESSURE: 116 MMHG | TEMPERATURE: 98 F

## 2023-12-29 DIAGNOSIS — Z90.49 ACQUIRED ABSENCE OF OTHER SPECIFIED PARTS OF DIGESTIVE TRACT: Chronic | ICD-10-CM

## 2023-12-29 DIAGNOSIS — Z98.891 HISTORY OF UTERINE SCAR FROM PREVIOUS SURGERY: Chronic | ICD-10-CM

## 2023-12-29 DIAGNOSIS — Z87.19 PERSONAL HISTORY OF OTHER DISEASES OF THE DIGESTIVE SYSTEM: Chronic | ICD-10-CM

## 2023-12-29 DIAGNOSIS — Z98.890 OTHER SPECIFIED POSTPROCEDURAL STATES: Chronic | ICD-10-CM

## 2023-12-29 DIAGNOSIS — E87.5 HYPERKALEMIA: ICD-10-CM

## 2023-12-29 DIAGNOSIS — Z95.828 PRESENCE OF OTHER VASCULAR IMPLANTS AND GRAFTS: Chronic | ICD-10-CM

## 2023-12-29 PROCEDURE — 96372 THER/PROPH/DIAG INJ SC/IM: CPT

## 2023-12-29 PROCEDURE — 36430 TRANSFUSION BLD/BLD COMPNT: CPT

## 2023-12-29 PROCEDURE — 96365 THER/PROPH/DIAG IV INF INIT: CPT

## 2023-12-29 PROCEDURE — P9040: CPT

## 2023-12-29 RX ORDER — IRON SUCROSE 20 MG/ML
200 INJECTION, SOLUTION INTRAVENOUS ONCE
Refills: 0 | Status: COMPLETED | OUTPATIENT
Start: 2023-12-29 | End: 2023-12-29

## 2023-12-29 RX ORDER — ERYTHROPOIETIN 10000 [IU]/ML
30000 INJECTION, SOLUTION INTRAVENOUS; SUBCUTANEOUS ONCE
Refills: 0 | Status: COMPLETED | OUTPATIENT
Start: 2023-12-29 | End: 2023-12-29

## 2023-12-29 RX ADMIN — ERYTHROPOIETIN 30000 UNIT(S): 10000 INJECTION, SOLUTION INTRAVENOUS; SUBCUTANEOUS at 13:23

## 2023-12-29 RX ADMIN — IRON SUCROSE 220 MILLIGRAM(S): 20 INJECTION, SOLUTION INTRAVENOUS at 13:23

## 2024-01-01 ENCOUNTER — OUTPATIENT (OUTPATIENT)
Dept: OUTPATIENT SERVICES | Facility: HOSPITAL | Age: 79
LOS: 1 days | End: 2024-01-01
Payer: MEDICARE

## 2024-01-01 ENCOUNTER — INPATIENT (INPATIENT)
Facility: HOSPITAL | Age: 79
LOS: 21 days | DRG: 72 | End: 2024-09-28
Attending: HOSPITALIST | Admitting: STUDENT IN AN ORGANIZED HEALTH CARE EDUCATION/TRAINING PROGRAM
Payer: MEDICARE

## 2024-01-01 VITALS
HEART RATE: 57 BPM | OXYGEN SATURATION: 99 % | TEMPERATURE: 100 F | DIASTOLIC BLOOD PRESSURE: 66 MMHG | RESPIRATION RATE: 18 BRPM | SYSTOLIC BLOOD PRESSURE: 94 MMHG

## 2024-01-01 VITALS
WEIGHT: 222.89 LBS | OXYGEN SATURATION: 100 % | HEART RATE: 79 BPM | HEIGHT: 67 IN | TEMPERATURE: 98 F | RESPIRATION RATE: 20 BRPM | DIASTOLIC BLOOD PRESSURE: 50 MMHG | SYSTOLIC BLOOD PRESSURE: 106 MMHG

## 2024-01-01 VITALS — HEART RATE: 71 BPM | TEMPERATURE: 98 F | DIASTOLIC BLOOD PRESSURE: 76 MMHG | SYSTOLIC BLOOD PRESSURE: 138 MMHG

## 2024-01-01 VITALS — DIASTOLIC BLOOD PRESSURE: 38 MMHG | SYSTOLIC BLOOD PRESSURE: 99 MMHG | TEMPERATURE: 98 F | HEART RATE: 72 BPM

## 2024-01-01 VITALS
TEMPERATURE: 96 F | HEART RATE: 86 BPM | SYSTOLIC BLOOD PRESSURE: 116 MMHG | DIASTOLIC BLOOD PRESSURE: 69 MMHG | RESPIRATION RATE: 14 BRPM

## 2024-01-01 VITALS — TEMPERATURE: 98 F | DIASTOLIC BLOOD PRESSURE: 69 MMHG | SYSTOLIC BLOOD PRESSURE: 121 MMHG | HEART RATE: 75 BPM

## 2024-01-01 VITALS — HEART RATE: 76 BPM | TEMPERATURE: 98 F | DIASTOLIC BLOOD PRESSURE: 60 MMHG | SYSTOLIC BLOOD PRESSURE: 103 MMHG

## 2024-01-01 DIAGNOSIS — D69.6 THROMBOCYTOPENIA, UNSPECIFIED: ICD-10-CM

## 2024-01-01 DIAGNOSIS — Z95.828 PRESENCE OF OTHER VASCULAR IMPLANTS AND GRAFTS: Chronic | ICD-10-CM

## 2024-01-01 DIAGNOSIS — E72.20 DISORDER OF UREA CYCLE METABOLISM, UNSPECIFIED: ICD-10-CM

## 2024-01-01 DIAGNOSIS — Z90.49 ACQUIRED ABSENCE OF OTHER SPECIFIED PARTS OF DIGESTIVE TRACT: Chronic | ICD-10-CM

## 2024-01-01 DIAGNOSIS — Z98.890 OTHER SPECIFIED POSTPROCEDURAL STATES: Chronic | ICD-10-CM

## 2024-01-01 DIAGNOSIS — R62.7 ADULT FAILURE TO THRIVE: ICD-10-CM

## 2024-01-01 DIAGNOSIS — B96.1 KLEBSIELLA PNEUMONIAE [K. PNEUMONIAE] AS THE CAUSE OF DISEASES CLASSIFIED ELSEWHERE: ICD-10-CM

## 2024-01-01 DIAGNOSIS — D84.81 IMMUNODEFICIENCY DUE TO CONDITIONS CLASSIFIED ELSEWHERE: ICD-10-CM

## 2024-01-01 DIAGNOSIS — G30.1 ALZHEIMER'S DISEASE WITH LATE ONSET: ICD-10-CM

## 2024-01-01 DIAGNOSIS — N10 ACUTE PYELONEPHRITIS: ICD-10-CM

## 2024-01-01 DIAGNOSIS — L89.314 PRESSURE ULCER OF RIGHT BUTTOCK, STAGE 4: ICD-10-CM

## 2024-01-01 DIAGNOSIS — E61.1 IRON DEFICIENCY: ICD-10-CM

## 2024-01-01 DIAGNOSIS — R78.81 BACTEREMIA: ICD-10-CM

## 2024-01-01 DIAGNOSIS — Z51.5 ENCOUNTER FOR PALLIATIVE CARE: ICD-10-CM

## 2024-01-01 DIAGNOSIS — I48.91 UNSPECIFIED ATRIAL FIBRILLATION: ICD-10-CM

## 2024-01-01 DIAGNOSIS — Z87.19 PERSONAL HISTORY OF OTHER DISEASES OF THE DIGESTIVE SYSTEM: Chronic | ICD-10-CM

## 2024-01-01 DIAGNOSIS — Z88.0 ALLERGY STATUS TO PENICILLIN: ICD-10-CM

## 2024-01-01 DIAGNOSIS — Z79.899 OTHER LONG TERM (CURRENT) DRUG THERAPY: ICD-10-CM

## 2024-01-01 DIAGNOSIS — Z98.891 HISTORY OF UTERINE SCAR FROM PREVIOUS SURGERY: Chronic | ICD-10-CM

## 2024-01-01 DIAGNOSIS — B96.20 UNSPECIFIED ESCHERICHIA COLI [E. COLI] AS THE CAUSE OF DISEASES CLASSIFIED ELSEWHERE: ICD-10-CM

## 2024-01-01 DIAGNOSIS — I67.1 CEREBRAL ANEURYSM, NONRUPTURED: ICD-10-CM

## 2024-01-01 DIAGNOSIS — R56.9 UNSPECIFIED CONVULSIONS: ICD-10-CM

## 2024-01-01 DIAGNOSIS — Z16.12 EXTENDED SPECTRUM BETA LACTAMASE (ESBL) RESISTANCE: ICD-10-CM

## 2024-01-01 DIAGNOSIS — I50.32 CHRONIC DIASTOLIC (CONGESTIVE) HEART FAILURE: ICD-10-CM

## 2024-01-01 DIAGNOSIS — L89.324 PRESSURE ULCER OF LEFT BUTTOCK, STAGE 4: ICD-10-CM

## 2024-01-01 DIAGNOSIS — K31.819 ANGIODYSPLASIA OF STOMACH AND DUODENUM WITHOUT BLEEDING: ICD-10-CM

## 2024-01-01 DIAGNOSIS — Z96.0 PRESENCE OF UROGENITAL IMPLANTS: ICD-10-CM

## 2024-01-01 DIAGNOSIS — D50.0 IRON DEFICIENCY ANEMIA SECONDARY TO BLOOD LOSS (CHRONIC): ICD-10-CM

## 2024-01-01 DIAGNOSIS — R94.31 ABNORMAL ELECTROCARDIOGRAM [ECG] [EKG]: ICD-10-CM

## 2024-01-01 DIAGNOSIS — N17.9 ACUTE KIDNEY FAILURE, UNSPECIFIED: ICD-10-CM

## 2024-01-01 DIAGNOSIS — G93.41 METABOLIC ENCEPHALOPATHY: ICD-10-CM

## 2024-01-01 DIAGNOSIS — Z88.6 ALLERGY STATUS TO ANALGESIC AGENT: ICD-10-CM

## 2024-01-01 DIAGNOSIS — K74.60 UNSPECIFIED CIRRHOSIS OF LIVER: ICD-10-CM

## 2024-01-01 DIAGNOSIS — B95.2 ENTEROCOCCUS AS THE CAUSE OF DISEASES CLASSIFIED ELSEWHERE: ICD-10-CM

## 2024-01-01 DIAGNOSIS — M46.28 OSTEOMYELITIS OF VERTEBRA, SACRAL AND SACROCOCCYGEAL REGION: ICD-10-CM

## 2024-01-01 DIAGNOSIS — Z91.040 LATEX ALLERGY STATUS: ICD-10-CM

## 2024-01-01 DIAGNOSIS — Z86.16 PERSONAL HISTORY OF COVID-19: ICD-10-CM

## 2024-01-01 DIAGNOSIS — I47.20 VENTRICULAR TACHYCARDIA, UNSPECIFIED: ICD-10-CM

## 2024-01-01 DIAGNOSIS — D63.1 ANEMIA IN CHRONIC KIDNEY DISEASE: ICD-10-CM

## 2024-01-01 DIAGNOSIS — I95.9 HYPOTENSION, UNSPECIFIED: ICD-10-CM

## 2024-01-01 DIAGNOSIS — N18.4 CHRONIC KIDNEY DISEASE, STAGE 4 (SEVERE): ICD-10-CM

## 2024-01-01 DIAGNOSIS — F02.C0 DEMENTIA IN OTHER DISEASES CLASSIFIED ELSEWHERE, SEVERE, WITHOUT BEHAVIORAL DISTURBANCE, PSYCHOTIC DISTURBANCE, MOOD DISTURBANCE, AND ANXIETY: ICD-10-CM

## 2024-01-01 DIAGNOSIS — I08.0 RHEUMATIC DISORDERS OF BOTH MITRAL AND AORTIC VALVES: ICD-10-CM

## 2024-01-01 DIAGNOSIS — Z78.1 PHYSICAL RESTRAINT STATUS: ICD-10-CM

## 2024-01-01 DIAGNOSIS — L89.90 PRESSURE ULCER OF UNSPECIFIED SITE, UNSPECIFIED STAGE: ICD-10-CM

## 2024-01-01 DIAGNOSIS — E87.5 HYPERKALEMIA: ICD-10-CM

## 2024-01-01 DIAGNOSIS — N17.0 ACUTE KIDNEY FAILURE WITH TUBULAR NECROSIS: ICD-10-CM

## 2024-01-01 DIAGNOSIS — R13.10 DYSPHAGIA, UNSPECIFIED: ICD-10-CM

## 2024-01-01 LAB
-  AMOXICILLIN/CLAVULANIC ACID: SIGNIFICANT CHANGE UP
-  AMPICILLIN/SULBACTAM: SIGNIFICANT CHANGE UP
-  AMPICILLIN/SULBACTAM: SIGNIFICANT CHANGE UP
-  AMPICILLIN: SIGNIFICANT CHANGE UP
-  AZTREONAM: SIGNIFICANT CHANGE UP
-  CEFAZOLIN: SIGNIFICANT CHANGE UP
-  CEFAZOLIN: SIGNIFICANT CHANGE UP
-  CEFEPIME: SIGNIFICANT CHANGE UP
-  CEFIDEROCOL: SIGNIFICANT CHANGE UP
-  CEFTAZIDIME/AVIBACTAM: SIGNIFICANT CHANGE UP
-  CEFTAZIDIME: SIGNIFICANT CHANGE UP
-  CEFTRIAXONE: SIGNIFICANT CHANGE UP
-  CEFTRIAXONE: SIGNIFICANT CHANGE UP
-  CEFUROXIME: SIGNIFICANT CHANGE UP
-  CIPROFLOXACIN: SIGNIFICANT CHANGE UP
-  ERTAPENEM: SIGNIFICANT CHANGE UP
-  ERTAPENEM: SIGNIFICANT CHANGE UP
-  GENTAMICIN SYNERGY: SIGNIFICANT CHANGE UP
-  GENTAMICIN SYNERGY: SIGNIFICANT CHANGE UP
-  GENTAMICIN: SIGNIFICANT CHANGE UP
-  GENTAMICIN: SIGNIFICANT CHANGE UP
-  IMIPENEM: SIGNIFICANT CHANGE UP
-  LEVOFLOXACIN: SIGNIFICANT CHANGE UP
-  MEROPENEM: SIGNIFICANT CHANGE UP
-  NITROFURANTOIN: SIGNIFICANT CHANGE UP
-  PIPERACILLIN/TAZOBACTAM: SIGNIFICANT CHANGE UP
-  RESISTANCE GENE KPC: SIGNIFICANT CHANGE UP
-  STREPTOMYCIN SYNERGY: SIGNIFICANT CHANGE UP
-  STREPTOMYCIN SYNERGY: SIGNIFICANT CHANGE UP
-  TOBRAMYCIN: SIGNIFICANT CHANGE UP
-  TOBRAMYCIN: SIGNIFICANT CHANGE UP
-  TRIMETHOPRIM/SULFAMETHOXAZOLE: SIGNIFICANT CHANGE UP
-  TRIMETHOPRIM/SULFAMETHOXAZOLE: SIGNIFICANT CHANGE UP
-  VANCOMYCIN: SIGNIFICANT CHANGE UP
ALBUMIN SERPL ELPH-MCNC: 2.2 G/DL — LOW (ref 3.5–5.2)
ALBUMIN SERPL ELPH-MCNC: 2.3 G/DL — LOW (ref 3.5–5.2)
ALBUMIN SERPL ELPH-MCNC: 2.4 G/DL — LOW (ref 3.5–5.2)
ALBUMIN SERPL ELPH-MCNC: 2.4 G/DL — LOW (ref 3.5–5.2)
ALBUMIN SERPL ELPH-MCNC: 2.5 G/DL — LOW (ref 3.5–5.2)
ALBUMIN SERPL ELPH-MCNC: 2.5 G/DL — LOW (ref 3.5–5.2)
ALBUMIN SERPL ELPH-MCNC: 2.6 G/DL — LOW (ref 3.5–5.2)
ALBUMIN SERPL ELPH-MCNC: 2.7 G/DL — LOW (ref 3.5–5.2)
ALBUMIN SERPL ELPH-MCNC: 2.8 G/DL — LOW (ref 3.5–5.2)
ALP SERPL-CCNC: 70 U/L — SIGNIFICANT CHANGE UP (ref 30–115)
ALP SERPL-CCNC: 73 U/L — SIGNIFICANT CHANGE UP (ref 30–115)
ALP SERPL-CCNC: 74 U/L — SIGNIFICANT CHANGE UP (ref 30–115)
ALP SERPL-CCNC: 74 U/L — SIGNIFICANT CHANGE UP (ref 30–115)
ALP SERPL-CCNC: 76 U/L — SIGNIFICANT CHANGE UP (ref 30–115)
ALP SERPL-CCNC: 76 U/L — SIGNIFICANT CHANGE UP (ref 30–115)
ALP SERPL-CCNC: 78 U/L — SIGNIFICANT CHANGE UP (ref 30–115)
ALP SERPL-CCNC: 79 U/L — SIGNIFICANT CHANGE UP (ref 30–115)
ALP SERPL-CCNC: 79 U/L — SIGNIFICANT CHANGE UP (ref 30–115)
ALP SERPL-CCNC: 82 U/L — SIGNIFICANT CHANGE UP (ref 30–115)
ALP SERPL-CCNC: 86 U/L — SIGNIFICANT CHANGE UP (ref 30–115)
ALT FLD-CCNC: 6 U/L — SIGNIFICANT CHANGE UP (ref 0–41)
ALT FLD-CCNC: 7 U/L — SIGNIFICANT CHANGE UP (ref 0–41)
ALT FLD-CCNC: 7 U/L — SIGNIFICANT CHANGE UP (ref 0–41)
ALT FLD-CCNC: 8 U/L — SIGNIFICANT CHANGE UP (ref 0–41)
ALT FLD-CCNC: 9 U/L — SIGNIFICANT CHANGE UP (ref 0–41)
ALT FLD-CCNC: 9 U/L — SIGNIFICANT CHANGE UP (ref 0–41)
AMMONIA BLD-MCNC: 103 UMOL/L — HIGH (ref 11–55)
AMMONIA BLD-MCNC: 119 UMOL/L — HIGH (ref 11–55)
AMMONIA BLD-MCNC: 125 UMOL/L — HIGH (ref 11–55)
AMMONIA BLD-MCNC: 58 UMOL/L — HIGH (ref 11–55)
AMMONIA BLD-MCNC: 80 UMOL/L — HIGH (ref 11–55)
ANION GAP SERPL CALC-SCNC: 10 MMOL/L — SIGNIFICANT CHANGE UP (ref 7–14)
ANION GAP SERPL CALC-SCNC: 11 MMOL/L — SIGNIFICANT CHANGE UP (ref 7–14)
ANION GAP SERPL CALC-SCNC: 11 MMOL/L — SIGNIFICANT CHANGE UP (ref 7–14)
ANION GAP SERPL CALC-SCNC: 13 MMOL/L — SIGNIFICANT CHANGE UP (ref 7–14)
ANION GAP SERPL CALC-SCNC: 14 MMOL/L — SIGNIFICANT CHANGE UP (ref 7–14)
ANION GAP SERPL CALC-SCNC: 15 MMOL/L — HIGH (ref 7–14)
ANION GAP SERPL CALC-SCNC: 5 MMOL/L — LOW (ref 7–14)
ANION GAP SERPL CALC-SCNC: 7 MMOL/L — SIGNIFICANT CHANGE UP (ref 7–14)
ANION GAP SERPL CALC-SCNC: 8 MMOL/L — SIGNIFICANT CHANGE UP (ref 7–14)
ANION GAP SERPL CALC-SCNC: 9 MMOL/L — SIGNIFICANT CHANGE UP (ref 7–14)
ANISOCYTOSIS BLD QL: SLIGHT — SIGNIFICANT CHANGE UP
APPEARANCE UR: CLEAR — SIGNIFICANT CHANGE UP
APTT BLD: 35.2 SEC — SIGNIFICANT CHANGE UP (ref 27–39.2)
APTT BLD: 37.7 SEC — SIGNIFICANT CHANGE UP (ref 27–39.2)
APTT BLD: 38 SEC — SIGNIFICANT CHANGE UP (ref 27–39.2)
AST SERPL-CCNC: 15 U/L — SIGNIFICANT CHANGE UP (ref 0–41)
AST SERPL-CCNC: 18 U/L — SIGNIFICANT CHANGE UP (ref 0–41)
AST SERPL-CCNC: 20 U/L — SIGNIFICANT CHANGE UP (ref 0–41)
AST SERPL-CCNC: 21 U/L — SIGNIFICANT CHANGE UP (ref 0–41)
AST SERPL-CCNC: 22 U/L — SIGNIFICANT CHANGE UP (ref 0–41)
AST SERPL-CCNC: 23 U/L — SIGNIFICANT CHANGE UP (ref 0–41)
AST SERPL-CCNC: 23 U/L — SIGNIFICANT CHANGE UP (ref 0–41)
AST SERPL-CCNC: 27 U/L — SIGNIFICANT CHANGE UP (ref 0–41)
AST SERPL-CCNC: 29 U/L — SIGNIFICANT CHANGE UP (ref 0–41)
BACTERIA # UR AUTO: ABNORMAL /HPF
BASOPHILS # BLD AUTO: 0 K/UL — SIGNIFICANT CHANGE UP (ref 0–0.2)
BASOPHILS # BLD AUTO: 0.01 K/UL — SIGNIFICANT CHANGE UP (ref 0–0.2)
BASOPHILS # BLD AUTO: 0.01 K/UL — SIGNIFICANT CHANGE UP (ref 0–0.2)
BASOPHILS # BLD AUTO: 0.02 K/UL — SIGNIFICANT CHANGE UP (ref 0–0.2)
BASOPHILS # BLD AUTO: 0.03 K/UL — SIGNIFICANT CHANGE UP (ref 0–0.2)
BASOPHILS # BLD AUTO: 0.03 K/UL — SIGNIFICANT CHANGE UP (ref 0–0.2)
BASOPHILS # BLD AUTO: 0.04 K/UL — SIGNIFICANT CHANGE UP (ref 0–0.2)
BASOPHILS NFR BLD AUTO: 0 % — SIGNIFICANT CHANGE UP (ref 0–1)
BASOPHILS NFR BLD AUTO: 0.1 % — SIGNIFICANT CHANGE UP (ref 0–1)
BASOPHILS NFR BLD AUTO: 0.2 % — SIGNIFICANT CHANGE UP (ref 0–1)
BASOPHILS NFR BLD AUTO: 0.3 % — SIGNIFICANT CHANGE UP (ref 0–1)
BASOPHILS NFR BLD AUTO: 0.4 % — SIGNIFICANT CHANGE UP (ref 0–1)
BASOPHILS NFR BLD AUTO: 0.5 % — SIGNIFICANT CHANGE UP (ref 0–1)
BILIRUB DIRECT SERPL-MCNC: <0.2 MG/DL — SIGNIFICANT CHANGE UP (ref 0–0.3)
BILIRUB INDIRECT FLD-MCNC: >0.1 MG/DL — LOW (ref 0.2–1.2)
BILIRUB SERPL-MCNC: 0.3 MG/DL — SIGNIFICANT CHANGE UP (ref 0.2–1.2)
BILIRUB SERPL-MCNC: 0.3 MG/DL — SIGNIFICANT CHANGE UP (ref 0.2–1.2)
BILIRUB SERPL-MCNC: 0.4 MG/DL — SIGNIFICANT CHANGE UP (ref 0.2–1.2)
BILIRUB SERPL-MCNC: 0.5 MG/DL — SIGNIFICANT CHANGE UP (ref 0.2–1.2)
BILIRUB SERPL-MCNC: 0.9 MG/DL — SIGNIFICANT CHANGE UP (ref 0.2–1.2)
BILIRUB SERPL-MCNC: 1 MG/DL — SIGNIFICANT CHANGE UP (ref 0.2–1.2)
BILIRUB SERPL-MCNC: 1 MG/DL — SIGNIFICANT CHANGE UP (ref 0.2–1.2)
BILIRUB SERPL-MCNC: 1.1 MG/DL — SIGNIFICANT CHANGE UP (ref 0.2–1.2)
BILIRUB UR-MCNC: NEGATIVE — SIGNIFICANT CHANGE UP
BLANDM BLD POS QL PROBE: SIGNIFICANT CHANGE UP
BUN SERPL-MCNC: 61 MG/DL — CRITICAL HIGH (ref 10–20)
BUN SERPL-MCNC: 61 MG/DL — CRITICAL HIGH (ref 10–20)
BUN SERPL-MCNC: 63 MG/DL — CRITICAL HIGH (ref 10–20)
BUN SERPL-MCNC: 64 MG/DL — CRITICAL HIGH (ref 10–20)
BUN SERPL-MCNC: 64 MG/DL — CRITICAL HIGH (ref 10–20)
BUN SERPL-MCNC: 65 MG/DL — CRITICAL HIGH (ref 10–20)
BUN SERPL-MCNC: 65 MG/DL — CRITICAL HIGH (ref 10–20)
BUN SERPL-MCNC: 66 MG/DL — CRITICAL HIGH (ref 10–20)
BUN SERPL-MCNC: 66 MG/DL — CRITICAL HIGH (ref 10–20)
BUN SERPL-MCNC: 77 MG/DL — CRITICAL HIGH (ref 10–20)
BUN SERPL-MCNC: 78 MG/DL — CRITICAL HIGH (ref 10–20)
BUN SERPL-MCNC: 83 MG/DL — CRITICAL HIGH (ref 10–20)
BUN SERPL-MCNC: 84 MG/DL — CRITICAL HIGH (ref 10–20)
BUN SERPL-MCNC: 87 MG/DL — CRITICAL HIGH (ref 10–20)
CALCIUM SERPL-MCNC: 7.6 MG/DL — LOW (ref 8.4–10.5)
CALCIUM SERPL-MCNC: 7.6 MG/DL — LOW (ref 8.4–10.5)
CALCIUM SERPL-MCNC: 7.8 MG/DL — LOW (ref 8.4–10.5)
CALCIUM SERPL-MCNC: 7.8 MG/DL — LOW (ref 8.4–10.5)
CALCIUM SERPL-MCNC: 7.9 MG/DL — LOW (ref 8.4–10.5)
CALCIUM SERPL-MCNC: 8 MG/DL — LOW (ref 8.4–10.5)
CALCIUM SERPL-MCNC: 8 MG/DL — LOW (ref 8.4–10.5)
CALCIUM SERPL-MCNC: 8.1 MG/DL — LOW (ref 8.4–10.5)
CALCIUM SERPL-MCNC: 8.2 MG/DL — LOW (ref 8.4–10.4)
CALCIUM SERPL-MCNC: 8.2 MG/DL — LOW (ref 8.4–10.5)
CALCIUM SERPL-MCNC: 8.2 MG/DL — LOW (ref 8.4–10.5)
CALCIUM SERPL-MCNC: 8.3 MG/DL — LOW (ref 8.4–10.4)
CALCIUM SERPL-MCNC: 8.3 MG/DL — LOW (ref 8.4–10.5)
CALCIUM SERPL-MCNC: 8.4 MG/DL — SIGNIFICANT CHANGE UP (ref 8.4–10.4)
CALCIUM SERPL-MCNC: 8.4 MG/DL — SIGNIFICANT CHANGE UP (ref 8.4–10.5)
CALCIUM SERPL-MCNC: 8.5 MG/DL — SIGNIFICANT CHANGE UP (ref 8.4–10.5)
CALCIUM SERPL-MCNC: 9 MG/DL — SIGNIFICANT CHANGE UP (ref 8.4–10.5)
CAST: 11 /LPF — HIGH (ref 0–4)
CHLORIDE SERPL-SCNC: 103 MMOL/L — SIGNIFICANT CHANGE UP (ref 98–110)
CHLORIDE SERPL-SCNC: 105 MMOL/L — SIGNIFICANT CHANGE UP (ref 98–110)
CHLORIDE SERPL-SCNC: 105 MMOL/L — SIGNIFICANT CHANGE UP (ref 98–110)
CHLORIDE SERPL-SCNC: 106 MMOL/L — SIGNIFICANT CHANGE UP (ref 98–110)
CHLORIDE SERPL-SCNC: 108 MMOL/L — SIGNIFICANT CHANGE UP (ref 98–110)
CHLORIDE SERPL-SCNC: 109 MMOL/L — SIGNIFICANT CHANGE UP (ref 98–110)
CHLORIDE SERPL-SCNC: 110 MMOL/L — SIGNIFICANT CHANGE UP (ref 98–110)
CHLORIDE SERPL-SCNC: 111 MMOL/L — HIGH (ref 98–110)
CHLORIDE SERPL-SCNC: 114 MMOL/L — HIGH (ref 98–110)
CHLORIDE SERPL-SCNC: 114 MMOL/L — HIGH (ref 98–110)
CHOLEST SERPL-MCNC: 77 MG/DL — SIGNIFICANT CHANGE UP
CK SERPL-CCNC: 11 U/L — SIGNIFICANT CHANGE UP (ref 0–225)
CK SERPL-CCNC: 13 U/L — SIGNIFICANT CHANGE UP (ref 0–225)
CK SERPL-CCNC: 29 U/L — SIGNIFICANT CHANGE UP (ref 0–225)
CO2 SERPL-SCNC: 19 MMOL/L — SIGNIFICANT CHANGE UP (ref 17–32)
CO2 SERPL-SCNC: 21 MMOL/L — SIGNIFICANT CHANGE UP (ref 17–32)
CO2 SERPL-SCNC: 23 MMOL/L — SIGNIFICANT CHANGE UP (ref 17–32)
CO2 SERPL-SCNC: 24 MMOL/L — SIGNIFICANT CHANGE UP (ref 17–32)
CO2 SERPL-SCNC: 25 MMOL/L — SIGNIFICANT CHANGE UP (ref 17–32)
CO2 SERPL-SCNC: 26 MMOL/L — SIGNIFICANT CHANGE UP (ref 17–32)
CO2 SERPL-SCNC: 27 MMOL/L — SIGNIFICANT CHANGE UP (ref 17–32)
CO2 SERPL-SCNC: 28 MMOL/L — SIGNIFICANT CHANGE UP (ref 17–32)
CO2 SERPL-SCNC: 28 MMOL/L — SIGNIFICANT CHANGE UP (ref 17–32)
CO2 SERPL-SCNC: 30 MMOL/L — SIGNIFICANT CHANGE UP (ref 17–32)
COLOR SPEC: YELLOW — SIGNIFICANT CHANGE UP
CREAT SERPL-MCNC: 2 MG/DL — HIGH (ref 0.7–1.5)
CREAT SERPL-MCNC: 2 MG/DL — HIGH (ref 0.7–1.5)
CREAT SERPL-MCNC: 2.1 MG/DL — HIGH (ref 0.7–1.5)
CREAT SERPL-MCNC: 2.2 MG/DL — HIGH (ref 0.7–1.5)
CREAT SERPL-MCNC: 2.3 MG/DL — HIGH (ref 0.7–1.5)
CREAT SERPL-MCNC: 2.4 MG/DL — HIGH (ref 0.7–1.5)
CREAT SERPL-MCNC: 2.6 MG/DL — HIGH (ref 0.7–1.5)
CREAT SERPL-MCNC: 2.6 MG/DL — HIGH (ref 0.7–1.5)
CREAT SERPL-MCNC: 2.7 MG/DL — HIGH (ref 0.7–1.5)
CREAT SERPL-MCNC: 3 MG/DL — HIGH (ref 0.7–1.5)
CREAT SERPL-MCNC: 3.1 MG/DL — HIGH (ref 0.7–1.5)
CREAT SERPL-MCNC: 3.1 MG/DL — HIGH (ref 0.7–1.5)
CREAT SERPL-MCNC: 3.2 MG/DL — HIGH (ref 0.7–1.5)
CULTURE RESULTS: ABNORMAL
CULTURE RESULTS: SIGNIFICANT CHANGE UP
DACRYOCYTES BLD QL SMEAR: SLIGHT — SIGNIFICANT CHANGE UP
DIFF PNL FLD: ABNORMAL
E FAECALIS DNA BLD POS QL NAA+NON-PROBE: SIGNIFICANT CHANGE UP
E FAECIUM DNA BLD POS QL NAA+NON-PROBE: SIGNIFICANT CHANGE UP
EGFR: 14 ML/MIN/1.73M2 — LOW
EGFR: 14 ML/MIN/1.73M2 — LOW
EGFR: 15 ML/MIN/1.73M2 — LOW
EGFR: 17 ML/MIN/1.73M2 — LOW
EGFR: 17 ML/MIN/1.73M2 — LOW
EGFR: 18 ML/MIN/1.73M2 — LOW
EGFR: 20 ML/MIN/1.73M2 — LOW
EGFR: 20 ML/MIN/1.73M2 — LOW
EGFR: 21 ML/MIN/1.73M2 — LOW
EGFR: 21 ML/MIN/1.73M2 — LOW
EGFR: 22 ML/MIN/1.73M2 — LOW
EGFR: 24 ML/MIN/1.73M2 — LOW
EGFR: 25 ML/MIN/1.73M2 — LOW
ELLIPTOCYTES BLD QL SMEAR: SLIGHT — SIGNIFICANT CHANGE UP
EOSINOPHIL # BLD AUTO: 0.08 K/UL — SIGNIFICANT CHANGE UP (ref 0–0.7)
EOSINOPHIL # BLD AUTO: 0.15 K/UL — SIGNIFICANT CHANGE UP (ref 0–0.7)
EOSINOPHIL # BLD AUTO: 0.16 K/UL — SIGNIFICANT CHANGE UP (ref 0–0.7)
EOSINOPHIL # BLD AUTO: 0.29 K/UL — SIGNIFICANT CHANGE UP (ref 0–0.7)
EOSINOPHIL # BLD AUTO: 0.47 K/UL — SIGNIFICANT CHANGE UP (ref 0–0.7)
EOSINOPHIL # BLD AUTO: 0.5 K/UL — SIGNIFICANT CHANGE UP (ref 0–0.7)
EOSINOPHIL # BLD AUTO: 0.59 K/UL — SIGNIFICANT CHANGE UP (ref 0–0.7)
EOSINOPHIL # BLD AUTO: 0.75 K/UL — HIGH (ref 0–0.7)
EOSINOPHIL # BLD AUTO: 0.81 K/UL — HIGH (ref 0–0.7)
EOSINOPHIL # BLD AUTO: 0.81 K/UL — HIGH (ref 0–0.7)
EOSINOPHIL # BLD AUTO: 0.85 K/UL — HIGH (ref 0–0.7)
EOSINOPHIL # BLD AUTO: 0.87 K/UL — HIGH (ref 0–0.7)
EOSINOPHIL # BLD AUTO: 1.01 K/UL — HIGH (ref 0–0.7)
EOSINOPHIL # BLD AUTO: 1.04 K/UL — HIGH (ref 0–0.7)
EOSINOPHIL # BLD AUTO: 1.09 K/UL — HIGH (ref 0–0.7)
EOSINOPHIL NFR BLD AUTO: 1.1 % — SIGNIFICANT CHANGE UP (ref 0–8)
EOSINOPHIL NFR BLD AUTO: 10.2 % — HIGH (ref 0–8)
EOSINOPHIL NFR BLD AUTO: 10.7 % — HIGH (ref 0–8)
EOSINOPHIL NFR BLD AUTO: 11.9 % — HIGH (ref 0–8)
EOSINOPHIL NFR BLD AUTO: 12 % — HIGH (ref 0–8)
EOSINOPHIL NFR BLD AUTO: 12 % — HIGH (ref 0–8)
EOSINOPHIL NFR BLD AUTO: 12.4 % — HIGH (ref 0–8)
EOSINOPHIL NFR BLD AUTO: 12.5 % — HIGH (ref 0–8)
EOSINOPHIL NFR BLD AUTO: 12.8 % — HIGH (ref 0–8)
EOSINOPHIL NFR BLD AUTO: 14.4 % — HIGH (ref 0–8)
EOSINOPHIL NFR BLD AUTO: 14.6 % — HIGH (ref 0–8)
EOSINOPHIL NFR BLD AUTO: 2.6 % — SIGNIFICANT CHANGE UP (ref 0–8)
EOSINOPHIL NFR BLD AUTO: 3.3 % — SIGNIFICANT CHANGE UP (ref 0–8)
EOSINOPHIL NFR BLD AUTO: 5.9 % — SIGNIFICANT CHANGE UP (ref 0–8)
EOSINOPHIL NFR BLD AUTO: 7.9 % — SIGNIFICANT CHANGE UP (ref 0–8)
FERRITIN SERPL-MCNC: 712 NG/ML — HIGH (ref 13–330)
FINE GRAN CASTS #/AREA URNS AUTO: PRESENT
FOLATE SERPL-MCNC: 14.4 NG/ML — SIGNIFICANT CHANGE UP
GAS PNL BLDA: SIGNIFICANT CHANGE UP
GAS PNL BLDV: SIGNIFICANT CHANGE UP
GLUCOSE BLDC GLUCOMTR-MCNC: 114 MG/DL — HIGH (ref 70–99)
GLUCOSE BLDC GLUCOMTR-MCNC: 124 MG/DL — HIGH (ref 70–99)
GLUCOSE BLDC GLUCOMTR-MCNC: 237 MG/DL — HIGH (ref 70–99)
GLUCOSE BLDC GLUCOMTR-MCNC: 77 MG/DL — SIGNIFICANT CHANGE UP (ref 70–99)
GLUCOSE BLDC GLUCOMTR-MCNC: 86 MG/DL — SIGNIFICANT CHANGE UP (ref 70–99)
GLUCOSE BLDC GLUCOMTR-MCNC: 92 MG/DL — SIGNIFICANT CHANGE UP (ref 70–99)
GLUCOSE BLDC GLUCOMTR-MCNC: 94 MG/DL — SIGNIFICANT CHANGE UP (ref 70–99)
GLUCOSE SERPL-MCNC: 104 MG/DL — HIGH (ref 70–99)
GLUCOSE SERPL-MCNC: 105 MG/DL — HIGH (ref 70–99)
GLUCOSE SERPL-MCNC: 120 MG/DL — HIGH (ref 70–99)
GLUCOSE SERPL-MCNC: 50 MG/DL — CRITICAL LOW (ref 70–99)
GLUCOSE SERPL-MCNC: 76 MG/DL — SIGNIFICANT CHANGE UP (ref 70–99)
GLUCOSE SERPL-MCNC: 78 MG/DL — SIGNIFICANT CHANGE UP (ref 70–99)
GLUCOSE SERPL-MCNC: 79 MG/DL — SIGNIFICANT CHANGE UP (ref 70–99)
GLUCOSE SERPL-MCNC: 81 MG/DL — SIGNIFICANT CHANGE UP (ref 70–99)
GLUCOSE SERPL-MCNC: 84 MG/DL — SIGNIFICANT CHANGE UP (ref 70–99)
GLUCOSE SERPL-MCNC: 85 MG/DL — SIGNIFICANT CHANGE UP (ref 70–99)
GLUCOSE SERPL-MCNC: 87 MG/DL — SIGNIFICANT CHANGE UP (ref 70–99)
GLUCOSE SERPL-MCNC: 87 MG/DL — SIGNIFICANT CHANGE UP (ref 70–99)
GLUCOSE SERPL-MCNC: 91 MG/DL — SIGNIFICANT CHANGE UP (ref 70–99)
GLUCOSE SERPL-MCNC: 91 MG/DL — SIGNIFICANT CHANGE UP (ref 70–99)
GLUCOSE SERPL-MCNC: 93 MG/DL — SIGNIFICANT CHANGE UP (ref 70–99)
GLUCOSE SERPL-MCNC: 93 MG/DL — SIGNIFICANT CHANGE UP (ref 70–99)
GLUCOSE SERPL-MCNC: 96 MG/DL — SIGNIFICANT CHANGE UP (ref 70–99)
GLUCOSE UR QL: NEGATIVE MG/DL — SIGNIFICANT CHANGE UP
GRAM STN FLD: ABNORMAL
HCT VFR BLD CALC: 24.1 % — LOW (ref 37–47)
HCT VFR BLD CALC: 24.3 % — LOW (ref 37–47)
HCT VFR BLD CALC: 25.2 % — LOW (ref 37–47)
HCT VFR BLD CALC: 25.5 % — LOW (ref 37–47)
HCT VFR BLD CALC: 26.7 % — LOW (ref 37–47)
HCT VFR BLD CALC: 26.7 % — LOW (ref 37–47)
HCT VFR BLD CALC: 27 % — LOW (ref 37–47)
HCT VFR BLD CALC: 27.3 % — LOW (ref 37–47)
HCT VFR BLD CALC: 28.7 % — LOW (ref 37–47)
HCT VFR BLD CALC: 28.9 % — LOW (ref 37–47)
HCT VFR BLD CALC: 29 % — LOW (ref 37–47)
HCT VFR BLD CALC: 29.1 % — LOW (ref 37–47)
HCT VFR BLD CALC: 29.5 % — LOW (ref 37–47)
HCT VFR BLD CALC: 30.1 % — LOW (ref 37–47)
HCT VFR BLD CALC: 30.2 % — LOW (ref 37–47)
HCT VFR BLD CALC: 30.9 % — LOW (ref 37–47)
HDLC SERPL-MCNC: 22 MG/DL — LOW
HGB BLD-MCNC: 7.2 G/DL — LOW (ref 12–16)
HGB BLD-MCNC: 7.3 G/DL — LOW (ref 12–16)
HGB BLD-MCNC: 7.5 G/DL — LOW (ref 12–16)
HGB BLD-MCNC: 7.6 G/DL — LOW (ref 12–16)
HGB BLD-MCNC: 7.9 G/DL — LOW (ref 12–16)
HGB BLD-MCNC: 8 G/DL — LOW (ref 12–16)
HGB BLD-MCNC: 8.1 G/DL — LOW (ref 12–16)
HGB BLD-MCNC: 8.2 G/DL — LOW (ref 12–16)
HGB BLD-MCNC: 8.4 G/DL — LOW (ref 12–16)
HGB BLD-MCNC: 8.5 G/DL — LOW (ref 12–16)
HGB BLD-MCNC: 8.6 G/DL — LOW (ref 12–16)
HGB BLD-MCNC: 8.7 G/DL — LOW (ref 12–16)
HGB BLD-MCNC: 8.9 G/DL — LOW (ref 12–16)
HGB BLD-MCNC: 9.1 G/DL — LOW (ref 12–16)
IMM GRANULOCYTES NFR BLD AUTO: 0.3 % — SIGNIFICANT CHANGE UP (ref 0.1–0.3)
IMM GRANULOCYTES NFR BLD AUTO: 0.4 % — HIGH (ref 0.1–0.3)
IMM GRANULOCYTES NFR BLD AUTO: 0.4 % — HIGH (ref 0.1–0.3)
IMM GRANULOCYTES NFR BLD AUTO: 0.5 % — HIGH (ref 0.1–0.3)
IMM GRANULOCYTES NFR BLD AUTO: 0.6 % — HIGH (ref 0.1–0.3)
IMM GRANULOCYTES NFR BLD AUTO: 0.6 % — HIGH (ref 0.1–0.3)
IMM GRANULOCYTES NFR BLD AUTO: 0.7 % — HIGH (ref 0.1–0.3)
IMM GRANULOCYTES NFR BLD AUTO: 0.7 % — HIGH (ref 0.1–0.3)
IMM GRANULOCYTES NFR BLD AUTO: 0.8 % — HIGH (ref 0.1–0.3)
IMM GRANULOCYTES NFR BLD AUTO: 1.3 % — HIGH (ref 0.1–0.3)
INR BLD: 1.04 RATIO — SIGNIFICANT CHANGE UP (ref 0.65–1.3)
INR BLD: 1.07 RATIO — SIGNIFICANT CHANGE UP (ref 0.65–1.3)
IRON SATN MFR SERPL: 43 UG/DL — SIGNIFICANT CHANGE UP (ref 35–150)
IRON SATN MFR SERPL: 44 % — SIGNIFICANT CHANGE UP (ref 15–50)
KETONES UR-MCNC: NEGATIVE MG/DL — SIGNIFICANT CHANGE UP
LACTATE SERPL-SCNC: 1.2 MMOL/L — SIGNIFICANT CHANGE UP (ref 0.7–2)
LACTATE SERPL-SCNC: 1.4 MMOL/L — SIGNIFICANT CHANGE UP (ref 0.7–2)
LACTATE SERPL-SCNC: 1.6 MMOL/L — SIGNIFICANT CHANGE UP (ref 0.7–2)
LACTATE SERPL-SCNC: 1.9 MMOL/L — SIGNIFICANT CHANGE UP (ref 0.7–2)
LDLC SERPL-MCNC: 33 MG/DL — SIGNIFICANT CHANGE UP
LEUKOCYTE ESTERASE UR-ACNC: ABNORMAL
LIPID PNL WITH DIRECT LDL SERPL: 33 MG/DL — SIGNIFICANT CHANGE UP
LYMPHOCYTES # BLD AUTO: 0.42 K/UL — LOW (ref 1.2–3.4)
LYMPHOCYTES # BLD AUTO: 0.49 K/UL — LOW (ref 1.2–3.4)
LYMPHOCYTES # BLD AUTO: 0.58 K/UL — LOW (ref 1.2–3.4)
LYMPHOCYTES # BLD AUTO: 0.76 K/UL — LOW (ref 1.2–3.4)
LYMPHOCYTES # BLD AUTO: 0.78 K/UL — LOW (ref 1.2–3.4)
LYMPHOCYTES # BLD AUTO: 0.79 K/UL — LOW (ref 1.2–3.4)
LYMPHOCYTES # BLD AUTO: 0.86 K/UL — LOW (ref 1.2–3.4)
LYMPHOCYTES # BLD AUTO: 0.89 K/UL — LOW (ref 1.2–3.4)
LYMPHOCYTES # BLD AUTO: 0.9 K/UL — LOW (ref 1.2–3.4)
LYMPHOCYTES # BLD AUTO: 0.96 K/UL — LOW (ref 1.2–3.4)
LYMPHOCYTES # BLD AUTO: 0.97 K/UL — LOW (ref 1.2–3.4)
LYMPHOCYTES # BLD AUTO: 1.02 K/UL — LOW (ref 1.2–3.4)
LYMPHOCYTES # BLD AUTO: 1.16 K/UL — LOW (ref 1.2–3.4)
LYMPHOCYTES # BLD AUTO: 1.23 K/UL — SIGNIFICANT CHANGE UP (ref 1.2–3.4)
LYMPHOCYTES # BLD AUTO: 1.41 K/UL — SIGNIFICANT CHANGE UP (ref 1.2–3.4)
LYMPHOCYTES # BLD AUTO: 11.9 % — LOW (ref 20.5–51.1)
LYMPHOCYTES # BLD AUTO: 12 % — LOW (ref 20.5–51.1)
LYMPHOCYTES # BLD AUTO: 12 % — LOW (ref 20.5–51.1)
LYMPHOCYTES # BLD AUTO: 12.1 % — LOW (ref 20.5–51.1)
LYMPHOCYTES # BLD AUTO: 13.9 % — LOW (ref 20.5–51.1)
LYMPHOCYTES # BLD AUTO: 14.1 % — LOW (ref 20.5–51.1)
LYMPHOCYTES # BLD AUTO: 14.2 % — LOW (ref 20.5–51.1)
LYMPHOCYTES # BLD AUTO: 15.4 % — LOW (ref 20.5–51.1)
LYMPHOCYTES # BLD AUTO: 15.5 % — LOW (ref 20.5–51.1)
LYMPHOCYTES # BLD AUTO: 16 % — LOW (ref 20.5–51.1)
LYMPHOCYTES # BLD AUTO: 16.3 % — LOW (ref 20.5–51.1)
LYMPHOCYTES # BLD AUTO: 16.6 % — LOW (ref 20.5–51.1)
LYMPHOCYTES # BLD AUTO: 18 % — LOW (ref 20.5–51.1)
LYMPHOCYTES # BLD AUTO: 8 % — LOW (ref 20.5–51.1)
LYMPHOCYTES # BLD AUTO: 9.2 % — LOW (ref 20.5–51.1)
MAGNESIUM SERPL-MCNC: 2 MG/DL — SIGNIFICANT CHANGE UP (ref 1.8–2.4)
MAGNESIUM SERPL-MCNC: 2 MG/DL — SIGNIFICANT CHANGE UP (ref 1.8–2.4)
MAGNESIUM SERPL-MCNC: 2.2 MG/DL — SIGNIFICANT CHANGE UP (ref 1.8–2.4)
MAGNESIUM SERPL-MCNC: 2.4 MG/DL — SIGNIFICANT CHANGE UP (ref 1.8–2.4)
MAGNESIUM SERPL-MCNC: 2.4 MG/DL — SIGNIFICANT CHANGE UP (ref 1.8–2.4)
MAGNESIUM SERPL-MCNC: 2.5 MG/DL — HIGH (ref 1.8–2.4)
MAGNESIUM SERPL-MCNC: 2.5 MG/DL — HIGH (ref 1.8–2.4)
MAGNESIUM SERPL-MCNC: 2.6 MG/DL — HIGH (ref 1.8–2.4)
MAGNESIUM SERPL-MCNC: 2.6 MG/DL — HIGH (ref 1.8–2.4)
MAGNESIUM SERPL-MCNC: 2.7 MG/DL — HIGH (ref 1.8–2.4)
MAGNESIUM SERPL-MCNC: 2.8 MG/DL — HIGH (ref 1.8–2.4)
MAGNESIUM SERPL-MCNC: 2.8 MG/DL — HIGH (ref 1.8–2.4)
MANUAL SMEAR VERIFICATION: SIGNIFICANT CHANGE UP
MCHC RBC-ENTMCNC: 28.5 G/DL — LOW (ref 32–37)
MCHC RBC-ENTMCNC: 29.3 G/DL — LOW (ref 32–37)
MCHC RBC-ENTMCNC: 29.4 G/DL — LOW (ref 32–37)
MCHC RBC-ENTMCNC: 29.4 PG — SIGNIFICANT CHANGE UP (ref 27–31)
MCHC RBC-ENTMCNC: 29.4 PG — SIGNIFICANT CHANGE UP (ref 27–31)
MCHC RBC-ENTMCNC: 29.5 G/DL — LOW (ref 32–37)
MCHC RBC-ENTMCNC: 29.5 PG — SIGNIFICANT CHANGE UP (ref 27–31)
MCHC RBC-ENTMCNC: 29.6 G/DL — LOW (ref 32–37)
MCHC RBC-ENTMCNC: 29.6 G/DL — LOW (ref 32–37)
MCHC RBC-ENTMCNC: 29.6 PG — SIGNIFICANT CHANGE UP (ref 27–31)
MCHC RBC-ENTMCNC: 29.6 PG — SIGNIFICANT CHANGE UP (ref 27–31)
MCHC RBC-ENTMCNC: 29.7 G/DL — LOW (ref 32–37)
MCHC RBC-ENTMCNC: 29.7 PG — SIGNIFICANT CHANGE UP (ref 27–31)
MCHC RBC-ENTMCNC: 29.8 G/DL — LOW (ref 32–37)
MCHC RBC-ENTMCNC: 29.8 G/DL — LOW (ref 32–37)
MCHC RBC-ENTMCNC: 29.8 PG — SIGNIFICANT CHANGE UP (ref 27–31)
MCHC RBC-ENTMCNC: 29.9 G/DL — LOW (ref 32–37)
MCHC RBC-ENTMCNC: 29.9 PG — SIGNIFICANT CHANGE UP (ref 27–31)
MCHC RBC-ENTMCNC: 30 G/DL — LOW (ref 32–37)
MCHC RBC-ENTMCNC: 30 PG — SIGNIFICANT CHANGE UP (ref 27–31)
MCHC RBC-ENTMCNC: 30.1 G/DL — LOW (ref 32–37)
MCHC RBC-ENTMCNC: 30.1 PG — SIGNIFICANT CHANGE UP (ref 27–31)
MCHC RBC-ENTMCNC: 30.2 PG — SIGNIFICANT CHANGE UP (ref 27–31)
MCHC RBC-ENTMCNC: 30.4 G/DL — LOW (ref 32–37)
MCHC RBC-ENTMCNC: 30.4 PG — SIGNIFICANT CHANGE UP (ref 27–31)
MCHC RBC-ENTMCNC: 30.5 PG — SIGNIFICANT CHANGE UP (ref 27–31)
MCHC RBC-ENTMCNC: 30.6 G/DL — LOW (ref 32–37)
MCHC RBC-ENTMCNC: 30.8 PG — SIGNIFICANT CHANGE UP (ref 27–31)
MCV RBC AUTO: 100 FL — HIGH (ref 81–99)
MCV RBC AUTO: 100.3 FL — HIGH (ref 81–99)
MCV RBC AUTO: 100.7 FL — HIGH (ref 81–99)
MCV RBC AUTO: 101 FL — HIGH (ref 81–99)
MCV RBC AUTO: 101.4 FL — HIGH (ref 81–99)
MCV RBC AUTO: 101.4 FL — HIGH (ref 81–99)
MCV RBC AUTO: 101.5 FL — HIGH (ref 81–99)
MCV RBC AUTO: 101.7 FL — HIGH (ref 81–99)
MCV RBC AUTO: 102 FL — HIGH (ref 81–99)
MCV RBC AUTO: 102.4 FL — HIGH (ref 81–99)
MCV RBC AUTO: 103.9 FL — HIGH (ref 81–99)
MCV RBC AUTO: 98 FL — SIGNIFICANT CHANGE UP (ref 81–99)
MCV RBC AUTO: 98.8 FL — SIGNIFICANT CHANGE UP (ref 81–99)
MCV RBC AUTO: 99.6 FL — HIGH (ref 81–99)
METHOD TYPE: SIGNIFICANT CHANGE UP
MONOCYTES # BLD AUTO: 0.1 K/UL — SIGNIFICANT CHANGE UP (ref 0.1–0.6)
MONOCYTES # BLD AUTO: 0.36 K/UL — SIGNIFICANT CHANGE UP (ref 0.1–0.6)
MONOCYTES # BLD AUTO: 0.52 K/UL — SIGNIFICANT CHANGE UP (ref 0.1–0.6)
MONOCYTES # BLD AUTO: 0.52 K/UL — SIGNIFICANT CHANGE UP (ref 0.1–0.6)
MONOCYTES # BLD AUTO: 0.54 K/UL — SIGNIFICANT CHANGE UP (ref 0.1–0.6)
MONOCYTES # BLD AUTO: 0.58 K/UL — SIGNIFICANT CHANGE UP (ref 0.1–0.6)
MONOCYTES # BLD AUTO: 0.65 K/UL — HIGH (ref 0.1–0.6)
MONOCYTES # BLD AUTO: 0.67 K/UL — HIGH (ref 0.1–0.6)
MONOCYTES # BLD AUTO: 0.68 K/UL — HIGH (ref 0.1–0.6)
MONOCYTES # BLD AUTO: 0.69 K/UL — HIGH (ref 0.1–0.6)
MONOCYTES # BLD AUTO: 0.72 K/UL — HIGH (ref 0.1–0.6)
MONOCYTES # BLD AUTO: 0.76 K/UL — HIGH (ref 0.1–0.6)
MONOCYTES # BLD AUTO: 0.83 K/UL — HIGH (ref 0.1–0.6)
MONOCYTES # BLD AUTO: 0.84 K/UL — HIGH (ref 0.1–0.6)
MONOCYTES # BLD AUTO: 1.02 K/UL — HIGH (ref 0.1–0.6)
MONOCYTES NFR BLD AUTO: 10.5 % — HIGH (ref 1.7–9.3)
MONOCYTES NFR BLD AUTO: 10.6 % — HIGH (ref 1.7–9.3)
MONOCYTES NFR BLD AUTO: 12 % — HIGH (ref 1.7–9.3)
MONOCYTES NFR BLD AUTO: 12.4 % — HIGH (ref 1.7–9.3)
MONOCYTES NFR BLD AUTO: 12.4 % — HIGH (ref 1.7–9.3)
MONOCYTES NFR BLD AUTO: 14.2 % — HIGH (ref 1.7–9.3)
MONOCYTES NFR BLD AUTO: 14.8 % — HIGH (ref 1.7–9.3)
MONOCYTES NFR BLD AUTO: 2.2 % — SIGNIFICANT CHANGE UP (ref 1.7–9.3)
MONOCYTES NFR BLD AUTO: 7.2 % — SIGNIFICANT CHANGE UP (ref 1.7–9.3)
MONOCYTES NFR BLD AUTO: 7.3 % — SIGNIFICANT CHANGE UP (ref 1.7–9.3)
MONOCYTES NFR BLD AUTO: 8.3 % — SIGNIFICANT CHANGE UP (ref 1.7–9.3)
MONOCYTES NFR BLD AUTO: 8.3 % — SIGNIFICANT CHANGE UP (ref 1.7–9.3)
MONOCYTES NFR BLD AUTO: 8.6 % — SIGNIFICANT CHANGE UP (ref 1.7–9.3)
MONOCYTES NFR BLD AUTO: 9.1 % — SIGNIFICANT CHANGE UP (ref 1.7–9.3)
MONOCYTES NFR BLD AUTO: 9.7 % — HIGH (ref 1.7–9.3)
NEUTROPHILS # BLD AUTO: 2.44 K/UL — SIGNIFICANT CHANGE UP (ref 1.4–6.5)
NEUTROPHILS # BLD AUTO: 2.55 K/UL — SIGNIFICANT CHANGE UP (ref 1.4–6.5)
NEUTROPHILS # BLD AUTO: 3.19 K/UL — SIGNIFICANT CHANGE UP (ref 1.4–6.5)
NEUTROPHILS # BLD AUTO: 3.26 K/UL — SIGNIFICANT CHANGE UP (ref 1.4–6.5)
NEUTROPHILS # BLD AUTO: 3.47 K/UL — SIGNIFICANT CHANGE UP (ref 1.4–6.5)
NEUTROPHILS # BLD AUTO: 3.84 K/UL — SIGNIFICANT CHANGE UP (ref 1.4–6.5)
NEUTROPHILS # BLD AUTO: 3.95 K/UL — SIGNIFICANT CHANGE UP (ref 1.4–6.5)
NEUTROPHILS # BLD AUTO: 4.36 K/UL — SIGNIFICANT CHANGE UP (ref 1.4–6.5)
NEUTROPHILS # BLD AUTO: 4.71 K/UL — SIGNIFICANT CHANGE UP (ref 1.4–6.5)
NEUTROPHILS # BLD AUTO: 4.89 K/UL — SIGNIFICANT CHANGE UP (ref 1.4–6.5)
NEUTROPHILS # BLD AUTO: 5.23 K/UL — SIGNIFICANT CHANGE UP (ref 1.4–6.5)
NEUTROPHILS # BLD AUTO: 5.34 K/UL — SIGNIFICANT CHANGE UP (ref 1.4–6.5)
NEUTROPHILS # BLD AUTO: 5.45 K/UL — SIGNIFICANT CHANGE UP (ref 1.4–6.5)
NEUTROPHILS # BLD AUTO: 5.6 K/UL — SIGNIFICANT CHANGE UP (ref 1.4–6.5)
NEUTROPHILS # BLD AUTO: 5.71 K/UL — SIGNIFICANT CHANGE UP (ref 1.4–6.5)
NEUTROPHILS NFR BLD AUTO: 54.1 % — SIGNIFICANT CHANGE UP (ref 42.2–75.2)
NEUTROPHILS NFR BLD AUTO: 55.5 % — SIGNIFICANT CHANGE UP (ref 42.2–75.2)
NEUTROPHILS NFR BLD AUTO: 57.4 % — SIGNIFICANT CHANGE UP (ref 42.2–75.2)
NEUTROPHILS NFR BLD AUTO: 58.6 % — SIGNIFICANT CHANGE UP (ref 42.2–75.2)
NEUTROPHILS NFR BLD AUTO: 61 % — SIGNIFICANT CHANGE UP (ref 42.2–75.2)
NEUTROPHILS NFR BLD AUTO: 62.8 % — SIGNIFICANT CHANGE UP (ref 42.2–75.2)
NEUTROPHILS NFR BLD AUTO: 64.6 % — SIGNIFICANT CHANGE UP (ref 42.2–75.2)
NEUTROPHILS NFR BLD AUTO: 66.3 % — SIGNIFICANT CHANGE UP (ref 42.2–75.2)
NEUTROPHILS NFR BLD AUTO: 66.6 % — SIGNIFICANT CHANGE UP (ref 42.2–75.2)
NEUTROPHILS NFR BLD AUTO: 67 % — SIGNIFICANT CHANGE UP (ref 42.2–75.2)
NEUTROPHILS NFR BLD AUTO: 70.3 % — SIGNIFICANT CHANGE UP (ref 42.2–75.2)
NEUTROPHILS NFR BLD AUTO: 70.4 % — SIGNIFICANT CHANGE UP (ref 42.2–75.2)
NEUTROPHILS NFR BLD AUTO: 75.1 % — SIGNIFICANT CHANGE UP (ref 42.2–75.2)
NEUTROPHILS NFR BLD AUTO: 77 % — HIGH (ref 42.2–75.2)
NEUTROPHILS NFR BLD AUTO: 83.6 % — HIGH (ref 42.2–75.2)
NITRITE UR-MCNC: NEGATIVE — SIGNIFICANT CHANGE UP
NONHDLC SERPL-MCNC: 55 MG/DL — SIGNIFICANT CHANGE UP
NRBC # BLD: 0 /100 WBCS — SIGNIFICANT CHANGE UP (ref 0–0)
NRBC BLD-RTO: 0 /100 WBCS — SIGNIFICANT CHANGE UP (ref 0–0)
NT-PROBNP SERPL-SCNC: HIGH PG/ML (ref 0–300)
ORGANISM # SPEC MICROSCOPIC CNT: ABNORMAL
ORGANISM # SPEC MICROSCOPIC CNT: SIGNIFICANT CHANGE UP
PH UR: 6 — SIGNIFICANT CHANGE UP (ref 5–8)
PHOSPHATE SERPL-MCNC: 3.3 MG/DL — SIGNIFICANT CHANGE UP (ref 2.1–4.9)
PHOSPHATE SERPL-MCNC: 3.5 MG/DL — SIGNIFICANT CHANGE UP (ref 2.1–4.9)
PHOSPHATE SERPL-MCNC: 3.8 MG/DL — SIGNIFICANT CHANGE UP (ref 2.1–4.9)
PHOSPHATE SERPL-MCNC: 5.4 MG/DL — HIGH (ref 2.1–4.9)
PLAT MORPH BLD: NORMAL — SIGNIFICANT CHANGE UP
PLATELET # BLD AUTO: 104 K/UL — LOW (ref 130–400)
PLATELET # BLD AUTO: 105 K/UL — LOW (ref 130–400)
PLATELET # BLD AUTO: 112 K/UL — LOW (ref 130–400)
PLATELET # BLD AUTO: 114 K/UL — LOW (ref 130–400)
PLATELET # BLD AUTO: 118 K/UL — LOW (ref 130–400)
PLATELET # BLD AUTO: 120 K/UL — LOW (ref 130–400)
PLATELET # BLD AUTO: 125 K/UL — LOW (ref 130–400)
PLATELET # BLD AUTO: 126 K/UL — LOW (ref 130–400)
PLATELET # BLD AUTO: 126 K/UL — LOW (ref 130–400)
PLATELET # BLD AUTO: 137 K/UL — SIGNIFICANT CHANGE UP (ref 130–400)
PLATELET # BLD AUTO: 138 K/UL — SIGNIFICANT CHANGE UP (ref 130–400)
PLATELET # BLD AUTO: 145 K/UL — SIGNIFICANT CHANGE UP (ref 130–400)
PLATELET # BLD AUTO: 73 K/UL — LOW (ref 130–400)
PLATELET # BLD AUTO: 86 K/UL — LOW (ref 130–400)
PLATELET # BLD AUTO: 92 K/UL — LOW (ref 130–400)
PLATELET # BLD AUTO: 97 K/UL — LOW (ref 130–400)
PMV BLD: 10.1 FL — SIGNIFICANT CHANGE UP (ref 7.4–10.4)
PMV BLD: 10.2 FL — SIGNIFICANT CHANGE UP (ref 7.4–10.4)
PMV BLD: 10.3 FL — SIGNIFICANT CHANGE UP (ref 7.4–10.4)
PMV BLD: 10.5 FL — HIGH (ref 7.4–10.4)
PMV BLD: 11 FL — HIGH (ref 7.4–10.4)
PMV BLD: 11.1 FL — HIGH (ref 7.4–10.4)
PMV BLD: 9.7 FL — SIGNIFICANT CHANGE UP (ref 7.4–10.4)
PMV BLD: 9.9 FL — SIGNIFICANT CHANGE UP (ref 7.4–10.4)
POIKILOCYTOSIS BLD QL AUTO: SLIGHT — SIGNIFICANT CHANGE UP
POLYCHROMASIA BLD QL SMEAR: SLIGHT — SIGNIFICANT CHANGE UP
POTASSIUM SERPL-MCNC: 3.6 MMOL/L — SIGNIFICANT CHANGE UP (ref 3.5–5)
POTASSIUM SERPL-MCNC: 3.8 MMOL/L — SIGNIFICANT CHANGE UP (ref 3.5–5)
POTASSIUM SERPL-MCNC: 3.8 MMOL/L — SIGNIFICANT CHANGE UP (ref 3.5–5)
POTASSIUM SERPL-MCNC: 3.9 MMOL/L — SIGNIFICANT CHANGE UP (ref 3.5–5)
POTASSIUM SERPL-MCNC: 4 MMOL/L — SIGNIFICANT CHANGE UP (ref 3.5–5)
POTASSIUM SERPL-MCNC: 4.3 MMOL/L — SIGNIFICANT CHANGE UP (ref 3.5–5)
POTASSIUM SERPL-MCNC: 4.4 MMOL/L — SIGNIFICANT CHANGE UP (ref 3.5–5)
POTASSIUM SERPL-MCNC: 4.5 MMOL/L — SIGNIFICANT CHANGE UP (ref 3.5–5)
POTASSIUM SERPL-MCNC: 4.6 MMOL/L — SIGNIFICANT CHANGE UP (ref 3.5–5)
POTASSIUM SERPL-MCNC: 4.7 MMOL/L — SIGNIFICANT CHANGE UP (ref 3.5–5)
POTASSIUM SERPL-MCNC: 4.8 MMOL/L — SIGNIFICANT CHANGE UP (ref 3.5–5)
POTASSIUM SERPL-MCNC: 5 MMOL/L — SIGNIFICANT CHANGE UP (ref 3.5–5)
POTASSIUM SERPL-MCNC: 5.1 MMOL/L — HIGH (ref 3.5–5)
POTASSIUM SERPL-MCNC: 5.2 MMOL/L — HIGH (ref 3.5–5)
POTASSIUM SERPL-MCNC: 5.2 MMOL/L — HIGH (ref 3.5–5)
POTASSIUM SERPL-MCNC: 5.4 MMOL/L — HIGH (ref 3.5–5)
POTASSIUM SERPL-MCNC: 5.8 MMOL/L — HIGH (ref 3.5–5)
POTASSIUM SERPL-SCNC: 3.6 MMOL/L — SIGNIFICANT CHANGE UP (ref 3.5–5)
POTASSIUM SERPL-SCNC: 3.8 MMOL/L — SIGNIFICANT CHANGE UP (ref 3.5–5)
POTASSIUM SERPL-SCNC: 3.8 MMOL/L — SIGNIFICANT CHANGE UP (ref 3.5–5)
POTASSIUM SERPL-SCNC: 3.9 MMOL/L — SIGNIFICANT CHANGE UP (ref 3.5–5)
POTASSIUM SERPL-SCNC: 4 MMOL/L — SIGNIFICANT CHANGE UP (ref 3.5–5)
POTASSIUM SERPL-SCNC: 4.3 MMOL/L — SIGNIFICANT CHANGE UP (ref 3.5–5)
POTASSIUM SERPL-SCNC: 4.4 MMOL/L — SIGNIFICANT CHANGE UP (ref 3.5–5)
POTASSIUM SERPL-SCNC: 4.5 MMOL/L — SIGNIFICANT CHANGE UP (ref 3.5–5)
POTASSIUM SERPL-SCNC: 4.6 MMOL/L — SIGNIFICANT CHANGE UP (ref 3.5–5)
POTASSIUM SERPL-SCNC: 4.7 MMOL/L — SIGNIFICANT CHANGE UP (ref 3.5–5)
POTASSIUM SERPL-SCNC: 4.8 MMOL/L — SIGNIFICANT CHANGE UP (ref 3.5–5)
POTASSIUM SERPL-SCNC: 5 MMOL/L — SIGNIFICANT CHANGE UP (ref 3.5–5)
POTASSIUM SERPL-SCNC: 5.1 MMOL/L — HIGH (ref 3.5–5)
POTASSIUM SERPL-SCNC: 5.2 MMOL/L — HIGH (ref 3.5–5)
POTASSIUM SERPL-SCNC: 5.2 MMOL/L — HIGH (ref 3.5–5)
POTASSIUM SERPL-SCNC: 5.4 MMOL/L — HIGH (ref 3.5–5)
POTASSIUM SERPL-SCNC: 5.8 MMOL/L — HIGH (ref 3.5–5)
PROT SERPL-MCNC: 5 G/DL — LOW (ref 6–8)
PROT SERPL-MCNC: 5.1 G/DL — LOW (ref 6–8)
PROT SERPL-MCNC: 5.4 G/DL — LOW (ref 6–8)
PROT SERPL-MCNC: 5.5 G/DL — LOW (ref 6–8)
PROT SERPL-MCNC: 5.7 G/DL — LOW (ref 6–8)
PROT SERPL-MCNC: 5.9 G/DL — LOW (ref 6–8)
PROT SERPL-MCNC: 6 G/DL — SIGNIFICANT CHANGE UP (ref 6–8)
PROT SERPL-MCNC: 6.2 G/DL — SIGNIFICANT CHANGE UP (ref 6–8)
PROT SERPL-MCNC: 6.3 G/DL — SIGNIFICANT CHANGE UP (ref 6–8)
PROT UR-MCNC: 100 MG/DL
PROTHROM AB SERPL-ACNC: 11.9 SEC — SIGNIFICANT CHANGE UP (ref 9.95–12.87)
PROTHROM AB SERPL-ACNC: 12.2 SEC — SIGNIFICANT CHANGE UP (ref 9.95–12.87)
RBC # BLD: 2.42 M/UL — LOW (ref 4.2–5.4)
RBC # BLD: 2.47 M/UL — LOW (ref 4.2–5.4)
RBC # BLD: 2.48 M/UL — LOW (ref 4.2–5.4)
RBC # BLD: 2.58 M/UL — LOW (ref 4.2–5.4)
RBC # BLD: 2.63 M/UL — LOW (ref 4.2–5.4)
RBC # BLD: 2.68 M/UL — LOW (ref 4.2–5.4)
RBC # BLD: 2.7 M/UL — LOW (ref 4.2–5.4)
RBC # BLD: 2.74 M/UL — LOW (ref 4.2–5.4)
RBC # BLD: 2.83 M/UL — LOW (ref 4.2–5.4)
RBC # BLD: 2.84 M/UL — LOW (ref 4.2–5.4)
RBC # BLD: 2.85 M/UL — LOW (ref 4.2–5.4)
RBC # BLD: 2.89 M/UL — LOW (ref 4.2–5.4)
RBC # BLD: 2.89 M/UL — LOW (ref 4.2–5.4)
RBC # BLD: 2.95 M/UL — LOW (ref 4.2–5.4)
RBC # BLD: 2.96 M/UL — LOW (ref 4.2–5.4)
RBC # BLD: 3.06 M/UL — LOW (ref 4.2–5.4)
RBC # FLD: 15.9 % — HIGH (ref 11.5–14.5)
RBC # FLD: 15.9 % — HIGH (ref 11.5–14.5)
RBC # FLD: 16 % — HIGH (ref 11.5–14.5)
RBC # FLD: 16.2 % — HIGH (ref 11.5–14.5)
RBC # FLD: 16.3 % — HIGH (ref 11.5–14.5)
RBC # FLD: 16.6 % — HIGH (ref 11.5–14.5)
RBC # FLD: 16.8 % — HIGH (ref 11.5–14.5)
RBC # FLD: 17.2 % — HIGH (ref 11.5–14.5)
RBC # FLD: 17.3 % — HIGH (ref 11.5–14.5)
RBC # FLD: 17.4 % — HIGH (ref 11.5–14.5)
RBC # FLD: 17.5 % — HIGH (ref 11.5–14.5)
RBC # FLD: 17.9 % — HIGH (ref 11.5–14.5)
RBC # FLD: 18.5 % — HIGH (ref 11.5–14.5)
RBC # FLD: 18.6 % — HIGH (ref 11.5–14.5)
RBC BLD AUTO: ABNORMAL
RBC CASTS # UR COMP ASSIST: 154 /HPF — HIGH (ref 0–4)
SMUDGE CELLS # BLD: PRESENT — SIGNIFICANT CHANGE UP
SODIUM SERPL-SCNC: 136 MMOL/L — SIGNIFICANT CHANGE UP (ref 135–146)
SODIUM SERPL-SCNC: 140 MMOL/L — SIGNIFICANT CHANGE UP (ref 135–146)
SODIUM SERPL-SCNC: 141 MMOL/L — SIGNIFICANT CHANGE UP (ref 135–146)
SODIUM SERPL-SCNC: 142 MMOL/L — SIGNIFICANT CHANGE UP (ref 135–146)
SODIUM SERPL-SCNC: 143 MMOL/L — SIGNIFICANT CHANGE UP (ref 135–146)
SODIUM SERPL-SCNC: 143 MMOL/L — SIGNIFICANT CHANGE UP (ref 135–146)
SODIUM SERPL-SCNC: 144 MMOL/L — SIGNIFICANT CHANGE UP (ref 135–146)
SODIUM SERPL-SCNC: 144 MMOL/L — SIGNIFICANT CHANGE UP (ref 135–146)
SODIUM SERPL-SCNC: 145 MMOL/L — SIGNIFICANT CHANGE UP (ref 135–146)
SODIUM SERPL-SCNC: 146 MMOL/L — SIGNIFICANT CHANGE UP (ref 135–146)
SODIUM SERPL-SCNC: 147 MMOL/L — HIGH (ref 135–146)
SODIUM SERPL-SCNC: 148 MMOL/L — HIGH (ref 135–146)
SP GR SPEC: 1.02 — SIGNIFICANT CHANGE UP (ref 1–1.03)
SPECIMEN SOURCE: SIGNIFICANT CHANGE UP
SPHEROCYTES BLD QL SMEAR: SLIGHT — SIGNIFICANT CHANGE UP
SQUAMOUS # UR AUTO: 1 /HPF — SIGNIFICANT CHANGE UP (ref 0–5)
TIBC SERPL-MCNC: 97 UG/DL — LOW (ref 220–430)
TRANSFERRIN SERPL-MCNC: 88 MG/DL — LOW (ref 200–360)
TRIGL SERPL-MCNC: 113 MG/DL — SIGNIFICANT CHANGE UP
TROPONIN T, HIGH SENSITIVITY RESULT: 133 NG/L — CRITICAL HIGH (ref 6–13)
TROPONIN T, HIGH SENSITIVITY RESULT: 143 NG/L — CRITICAL HIGH (ref 6–13)
TSH SERPL-MCNC: 5.74 UIU/ML — HIGH (ref 0.27–4.2)
UIBC SERPL-MCNC: 54 UG/DL — LOW (ref 110–370)
UROBILINOGEN FLD QL: 1 MG/DL — SIGNIFICANT CHANGE UP (ref 0.2–1)
VIT B12 SERPL-MCNC: 1523 PG/ML — HIGH (ref 232–1245)
WBC # BLD: 4.18 K/UL — LOW (ref 4.8–10.8)
WBC # BLD: 4.39 K/UL — LOW (ref 4.8–10.8)
WBC # BLD: 4.59 K/UL — LOW (ref 4.8–10.8)
WBC # BLD: 4.93 K/UL — SIGNIFICANT CHANGE UP (ref 4.8–10.8)
WBC # BLD: 5.33 K/UL — SIGNIFICANT CHANGE UP (ref 4.8–10.8)
WBC # BLD: 5.55 K/UL — SIGNIFICANT CHANGE UP (ref 4.8–10.8)
WBC # BLD: 5.9 K/UL — SIGNIFICANT CHANGE UP (ref 4.8–10.8)
WBC # BLD: 6.12 K/UL — SIGNIFICANT CHANGE UP (ref 4.8–10.8)
WBC # BLD: 6.3 K/UL — SIGNIFICANT CHANGE UP (ref 4.8–10.8)
WBC # BLD: 6.54 K/UL — SIGNIFICANT CHANGE UP (ref 4.8–10.8)
WBC # BLD: 7.25 K/UL — SIGNIFICANT CHANGE UP (ref 4.8–10.8)
WBC # BLD: 7.45 K/UL — SIGNIFICANT CHANGE UP (ref 4.8–10.8)
WBC # BLD: 8.06 K/UL — SIGNIFICANT CHANGE UP (ref 4.8–10.8)
WBC # BLD: 8.51 K/UL — SIGNIFICANT CHANGE UP (ref 4.8–10.8)
WBC # BLD: 8.52 K/UL — SIGNIFICANT CHANGE UP (ref 4.8–10.8)
WBC # BLD: 8.67 K/UL — SIGNIFICANT CHANGE UP (ref 4.8–10.8)
WBC # FLD AUTO: 4.18 K/UL — LOW (ref 4.8–10.8)
WBC # FLD AUTO: 4.39 K/UL — LOW (ref 4.8–10.8)
WBC # FLD AUTO: 4.59 K/UL — LOW (ref 4.8–10.8)
WBC # FLD AUTO: 4.93 K/UL — SIGNIFICANT CHANGE UP (ref 4.8–10.8)
WBC # FLD AUTO: 5.33 K/UL — SIGNIFICANT CHANGE UP (ref 4.8–10.8)
WBC # FLD AUTO: 5.55 K/UL — SIGNIFICANT CHANGE UP (ref 4.8–10.8)
WBC # FLD AUTO: 5.9 K/UL — SIGNIFICANT CHANGE UP (ref 4.8–10.8)
WBC # FLD AUTO: 6.12 K/UL — SIGNIFICANT CHANGE UP (ref 4.8–10.8)
WBC # FLD AUTO: 6.3 K/UL — SIGNIFICANT CHANGE UP (ref 4.8–10.8)
WBC # FLD AUTO: 6.54 K/UL — SIGNIFICANT CHANGE UP (ref 4.8–10.8)
WBC # FLD AUTO: 7.25 K/UL — SIGNIFICANT CHANGE UP (ref 4.8–10.8)
WBC # FLD AUTO: 7.45 K/UL — SIGNIFICANT CHANGE UP (ref 4.8–10.8)
WBC # FLD AUTO: 8.06 K/UL — SIGNIFICANT CHANGE UP (ref 4.8–10.8)
WBC # FLD AUTO: 8.51 K/UL — SIGNIFICANT CHANGE UP (ref 4.8–10.8)
WBC # FLD AUTO: 8.52 K/UL — SIGNIFICANT CHANGE UP (ref 4.8–10.8)
WBC # FLD AUTO: 8.67 K/UL — SIGNIFICANT CHANGE UP (ref 4.8–10.8)
WBC UR QL: 36 /HPF — HIGH (ref 0–5)
YEAST-LIKE CELLS: PRESENT

## 2024-01-01 PROCEDURE — 99231 SBSQ HOSP IP/OBS SF/LOW 25: CPT

## 2024-01-01 PROCEDURE — 95819 EEG AWAKE AND ASLEEP: CPT | Mod: 26

## 2024-01-01 PROCEDURE — 99223 1ST HOSP IP/OBS HIGH 75: CPT

## 2024-01-01 PROCEDURE — 93970 EXTREMITY STUDY: CPT | Mod: 26

## 2024-01-01 PROCEDURE — 96372 THER/PROPH/DIAG INJ SC/IM: CPT

## 2024-01-01 PROCEDURE — 99232 SBSQ HOSP IP/OBS MODERATE 35: CPT

## 2024-01-01 PROCEDURE — 85025 COMPLETE CBC W/AUTO DIFF WBC: CPT

## 2024-01-01 PROCEDURE — 95711 VEEG 2-12 HR UNMONITORED: CPT

## 2024-01-01 PROCEDURE — 80061 LIPID PANEL: CPT

## 2024-01-01 PROCEDURE — 99497 ADVNCD CARE PLAN 30 MIN: CPT | Mod: 25

## 2024-01-01 PROCEDURE — 99233 SBSQ HOSP IP/OBS HIGH 50: CPT

## 2024-01-01 PROCEDURE — 86902 BLOOD TYPE ANTIGEN DONOR EA: CPT

## 2024-01-01 PROCEDURE — 83735 ASSAY OF MAGNESIUM: CPT

## 2024-01-01 PROCEDURE — 71045 X-RAY EXAM CHEST 1 VIEW: CPT

## 2024-01-01 PROCEDURE — 97110 THERAPEUTIC EXERCISES: CPT | Mod: GP

## 2024-01-01 PROCEDURE — 95819 EEG AWAKE AND ASLEEP: CPT

## 2024-01-01 PROCEDURE — 93010 ELECTROCARDIOGRAM REPORT: CPT

## 2024-01-01 PROCEDURE — 76705 ECHO EXAM OF ABDOMEN: CPT

## 2024-01-01 PROCEDURE — 80076 HEPATIC FUNCTION PANEL: CPT

## 2024-01-01 PROCEDURE — 86900 BLOOD TYPING SEROLOGIC ABO: CPT

## 2024-01-01 PROCEDURE — 99291 CRITICAL CARE FIRST HOUR: CPT

## 2024-01-01 PROCEDURE — 87077 CULTURE AEROBIC IDENTIFY: CPT

## 2024-01-01 PROCEDURE — 82140 ASSAY OF AMMONIA: CPT

## 2024-01-01 PROCEDURE — 97167 OT EVAL HIGH COMPLEX 60 MIN: CPT | Mod: GO

## 2024-01-01 PROCEDURE — 86901 BLOOD TYPING SEROLOGIC RH(D): CPT

## 2024-01-01 PROCEDURE — 99238 HOSP IP/OBS DSCHRG MGMT 30/<: CPT

## 2024-01-01 PROCEDURE — 87150 DNA/RNA AMPLIFIED PROBE: CPT

## 2024-01-01 PROCEDURE — 85027 COMPLETE CBC AUTOMATED: CPT

## 2024-01-01 PROCEDURE — 85018 HEMOGLOBIN: CPT

## 2024-01-01 PROCEDURE — 97112 NEUROMUSCULAR REEDUCATION: CPT | Mod: GO

## 2024-01-01 PROCEDURE — 99221 1ST HOSP IP/OBS SF/LOW 40: CPT

## 2024-01-01 PROCEDURE — 87186 SC STD MICRODIL/AGAR DIL: CPT

## 2024-01-01 PROCEDURE — 86922 COMPATIBILITY TEST ANTIGLOB: CPT

## 2024-01-01 PROCEDURE — 76705 ECHO EXAM OF ABDOMEN: CPT | Mod: 26

## 2024-01-01 PROCEDURE — 82607 VITAMIN B-12: CPT

## 2024-01-01 PROCEDURE — 87075 CULTR BACTERIA EXCEPT BLOOD: CPT

## 2024-01-01 PROCEDURE — 70450 CT HEAD/BRAIN W/O DYE: CPT | Mod: MC

## 2024-01-01 PROCEDURE — 0042T: CPT | Mod: MC

## 2024-01-01 PROCEDURE — 97530 THERAPEUTIC ACTIVITIES: CPT | Mod: GP

## 2024-01-01 PROCEDURE — 82803 BLOOD GASES ANY COMBINATION: CPT

## 2024-01-01 PROCEDURE — 97163 PT EVAL HIGH COMPLEX 45 MIN: CPT | Mod: GP

## 2024-01-01 PROCEDURE — 80053 COMPREHEN METABOLIC PANEL: CPT

## 2024-01-01 PROCEDURE — 83550 IRON BINDING TEST: CPT

## 2024-01-01 PROCEDURE — 36430 TRANSFUSION BLD/BLD COMPNT: CPT

## 2024-01-01 PROCEDURE — 92526 ORAL FUNCTION THERAPY: CPT | Mod: GN

## 2024-01-01 PROCEDURE — 97535 SELF CARE MNGMENT TRAINING: CPT | Mod: GO

## 2024-01-01 PROCEDURE — 82330 ASSAY OF CALCIUM: CPT

## 2024-01-01 PROCEDURE — 82962 GLUCOSE BLOOD TEST: CPT

## 2024-01-01 PROCEDURE — 93306 TTE W/DOPPLER COMPLETE: CPT

## 2024-01-01 PROCEDURE — 82746 ASSAY OF FOLIC ACID SERUM: CPT

## 2024-01-01 PROCEDURE — 99221 1ST HOSP IP/OBS SF/LOW 40: CPT | Mod: 24,25

## 2024-01-01 PROCEDURE — 87070 CULTURE OTHR SPECIMN AEROBIC: CPT

## 2024-01-01 PROCEDURE — 71045 X-RAY EXAM CHEST 1 VIEW: CPT | Mod: 26

## 2024-01-01 PROCEDURE — P9040: CPT

## 2024-01-01 PROCEDURE — 84100 ASSAY OF PHOSPHORUS: CPT

## 2024-01-01 PROCEDURE — 70498 CT ANGIOGRAPHY NECK: CPT | Mod: MC

## 2024-01-01 PROCEDURE — 83880 ASSAY OF NATRIURETIC PEPTIDE: CPT

## 2024-01-01 PROCEDURE — 99213 OFFICE O/P EST LOW 20 MIN: CPT | Mod: 25

## 2024-01-01 PROCEDURE — 93005 ELECTROCARDIOGRAM TRACING: CPT

## 2024-01-01 PROCEDURE — P9016: CPT

## 2024-01-01 PROCEDURE — 0042T: CPT

## 2024-01-01 PROCEDURE — 84295 ASSAY OF SERUM SODIUM: CPT

## 2024-01-01 PROCEDURE — 84484 ASSAY OF TROPONIN QUANT: CPT

## 2024-01-01 PROCEDURE — 96365 THER/PROPH/DIAG IV INF INIT: CPT

## 2024-01-01 PROCEDURE — 83540 ASSAY OF IRON: CPT

## 2024-01-01 PROCEDURE — 87040 BLOOD CULTURE FOR BACTERIA: CPT

## 2024-01-01 PROCEDURE — 74018 RADEX ABDOMEN 1 VIEW: CPT | Mod: 26

## 2024-01-01 PROCEDURE — 36415 COLL VENOUS BLD VENIPUNCTURE: CPT

## 2024-01-01 PROCEDURE — 70450 CT HEAD/BRAIN W/O DYE: CPT | Mod: 26,MC

## 2024-01-01 PROCEDURE — 92610 EVALUATE SWALLOWING FUNCTION: CPT | Mod: GN

## 2024-01-01 PROCEDURE — C9254: CPT

## 2024-01-01 PROCEDURE — 70498 CT ANGIOGRAPHY NECK: CPT | Mod: 26

## 2024-01-01 PROCEDURE — 86850 RBC ANTIBODY SCREEN: CPT

## 2024-01-01 PROCEDURE — 93306 TTE W/DOPPLER COMPLETE: CPT | Mod: 26

## 2024-01-01 PROCEDURE — 93970 EXTREMITY STUDY: CPT

## 2024-01-01 PROCEDURE — 95720 EEG PHY/QHP EA INCR W/VEEG: CPT

## 2024-01-01 PROCEDURE — 84132 ASSAY OF SERUM POTASSIUM: CPT

## 2024-01-01 PROCEDURE — 85730 THROMBOPLASTIN TIME PARTIAL: CPT

## 2024-01-01 PROCEDURE — 95700 EEG CONT REC W/VID EEG TECH: CPT

## 2024-01-01 PROCEDURE — 83605 ASSAY OF LACTIC ACID: CPT

## 2024-01-01 PROCEDURE — 84443 ASSAY THYROID STIM HORMONE: CPT

## 2024-01-01 PROCEDURE — 95714 VEEG EA 12-26 HR UNMNTR: CPT

## 2024-01-01 PROCEDURE — 70496 CT ANGIOGRAPHY HEAD: CPT | Mod: 26

## 2024-01-01 PROCEDURE — 11043 DBRDMT MUSC&/FSCA 1ST 20/<: CPT | Mod: 58

## 2024-01-01 PROCEDURE — 95816 EEG AWAKE AND DROWSY: CPT | Mod: 26

## 2024-01-01 PROCEDURE — 80048 BASIC METABOLIC PNL TOTAL CA: CPT

## 2024-01-01 PROCEDURE — 85610 PROTHROMBIN TIME: CPT

## 2024-01-01 PROCEDURE — 74018 RADEX ABDOMEN 1 VIEW: CPT

## 2024-01-01 PROCEDURE — 84466 ASSAY OF TRANSFERRIN: CPT

## 2024-01-01 PROCEDURE — 82550 ASSAY OF CK (CPK): CPT

## 2024-01-01 PROCEDURE — 85014 HEMATOCRIT: CPT

## 2024-01-01 PROCEDURE — 70496 CT ANGIOGRAPHY HEAD: CPT | Mod: MC

## 2024-01-01 PROCEDURE — 70450 CT HEAD/BRAIN W/O DYE: CPT | Mod: 26,XU

## 2024-01-01 PROCEDURE — 82728 ASSAY OF FERRITIN: CPT

## 2024-01-01 RX ORDER — METRONIDAZOLE 250 MG
500 TABLET ORAL ONCE
Refills: 0 | Status: COMPLETED | OUTPATIENT
Start: 2024-01-01 | End: 2024-01-01

## 2024-01-01 RX ORDER — AMIODARONE HYDROCHLORIDE 50 MG/ML
1 INJECTION, SOLUTION INTRAVENOUS
Qty: 450 | Refills: 0 | Status: DISCONTINUED | OUTPATIENT
Start: 2024-01-01 | End: 2024-01-01

## 2024-01-01 RX ORDER — SODIUM ZIRCONIUM CYCLOSILICATE 5 G/5G
10 POWDER, FOR SUSPENSION ORAL ONCE
Refills: 0 | Status: DISCONTINUED | OUTPATIENT
Start: 2024-01-01 | End: 2024-01-01

## 2024-01-01 RX ORDER — MIDODRINE HYDROCHLORIDE 5 MG/1
10 TABLET ORAL THREE TIMES A DAY
Refills: 0 | Status: DISCONTINUED | OUTPATIENT
Start: 2024-01-01 | End: 2024-01-01

## 2024-01-01 RX ORDER — ACETAMINOPHEN 500 MG/5ML
650 LIQUID (ML) ORAL EVERY 6 HOURS
Refills: 0 | Status: DISCONTINUED | OUTPATIENT
Start: 2024-01-01 | End: 2024-01-01

## 2024-01-01 RX ORDER — IRON SUCROSE 20 MG/ML
200 INJECTION, SOLUTION INTRAVENOUS ONCE
Refills: 0 | Status: COMPLETED | OUTPATIENT
Start: 2024-01-01 | End: 2024-01-01

## 2024-01-01 RX ORDER — CEFEPIME 2 G/20ML
INJECTION, POWDER, FOR SOLUTION INTRAVENOUS
Refills: 0 | Status: DISCONTINUED | OUTPATIENT
Start: 2024-01-01 | End: 2024-01-01

## 2024-01-01 RX ORDER — FOLIC ACID 1 MG/1
1 TABLET ORAL DAILY
Refills: 0 | Status: DISCONTINUED | OUTPATIENT
Start: 2024-01-01 | End: 2024-01-01

## 2024-01-01 RX ORDER — SODIUM CHLORIDE 9 G/1000ML
500 INJECTION, SOLUTION INTRAVENOUS ONCE
Refills: 0 | Status: DISCONTINUED | OUTPATIENT
Start: 2024-01-01 | End: 2024-01-01

## 2024-01-01 RX ORDER — SILVER SULFADIAZINE 1 %
1 CREAM (GRAM) TOPICAL
Refills: 0 | DISCHARGE

## 2024-01-01 RX ORDER — AMIODARONE HYDROCHLORIDE 50 MG/ML
0.5 INJECTION, SOLUTION INTRAVENOUS
Qty: 450 | Refills: 0 | Status: DISCONTINUED | OUTPATIENT
Start: 2024-01-01 | End: 2024-01-01

## 2024-01-01 RX ORDER — EPOETIN ALFA 3000 [IU]/ML
30000 SOLUTION INTRAVENOUS; SUBCUTANEOUS ONCE
Refills: 0 | Status: COMPLETED | OUTPATIENT
Start: 2024-01-01 | End: 2024-01-01

## 2024-01-01 RX ORDER — MEROPENEM 1 G/30ML
500 INJECTION INTRAVENOUS EVERY 12 HOURS
Refills: 0 | Status: DISCONTINUED | OUTPATIENT
Start: 2024-01-01 | End: 2024-01-01

## 2024-01-01 RX ORDER — MEROPENEM 1 G/30ML
INJECTION INTRAVENOUS
Refills: 0 | Status: DISCONTINUED | OUTPATIENT
Start: 2024-01-01 | End: 2024-01-01

## 2024-01-01 RX ORDER — BUMETANIDE 1 MG/1
2 TABLET ORAL
Refills: 0 | Status: DISCONTINUED | OUTPATIENT
Start: 2024-01-01 | End: 2024-01-01

## 2024-01-01 RX ORDER — BUMETANIDE 1 MG/1
1 TABLET ORAL
Refills: 0 | Status: DISCONTINUED | OUTPATIENT
Start: 2024-01-01 | End: 2024-01-01

## 2024-01-01 RX ORDER — AMIODARONE HYDROCHLORIDE 50 MG/ML
150 INJECTION, SOLUTION INTRAVENOUS ONCE
Refills: 0 | Status: COMPLETED | OUTPATIENT
Start: 2024-01-01 | End: 2024-01-01

## 2024-01-01 RX ORDER — METOPROLOL SUCCINATE 50 MG/1
2.5 TABLET, EXTENDED RELEASE ORAL ONCE
Refills: 0 | Status: COMPLETED | OUTPATIENT
Start: 2024-01-01 | End: 2024-01-01

## 2024-01-01 RX ORDER — CEFTRIAXONE 500 MG/1
1000 INJECTION, POWDER, FOR SOLUTION INTRAMUSCULAR; INTRAVENOUS EVERY 24 HOURS
Refills: 0 | Status: DISCONTINUED | OUTPATIENT
Start: 2024-01-01 | End: 2024-01-01

## 2024-01-01 RX ORDER — MIDODRINE HYDROCHLORIDE 5 MG/1
1 TABLET ORAL
Refills: 0 | DISCHARGE

## 2024-01-01 RX ORDER — SENNA 187 MG
2 TABLET ORAL
Refills: 0 | DISCHARGE

## 2024-01-01 RX ORDER — ERYTHROPOIETIN 10000 [IU]/ML
30000 INJECTION, SOLUTION INTRAVENOUS; SUBCUTANEOUS ONCE
Refills: 0 | Status: COMPLETED | OUTPATIENT
Start: 2024-01-01 | End: 2024-01-01

## 2024-01-01 RX ORDER — CEFEPIME 2 G/20ML
2000 INJECTION, POWDER, FOR SOLUTION INTRAVENOUS DAILY
Refills: 0 | Status: DISCONTINUED | OUTPATIENT
Start: 2024-01-01 | End: 2024-01-01

## 2024-01-01 RX ORDER — HYDROMORPHONE/SOD CHLOR,ISO/PF 2 MG/10 ML
0.5 SYRINGE (ML) INJECTION
Refills: 0 | Status: DISCONTINUED | OUTPATIENT
Start: 2024-01-01 | End: 2024-01-01

## 2024-01-01 RX ORDER — DEXTROSE 50 % IN WATER 50 %
50 SYRINGE (ML) INTRAVENOUS ONCE
Refills: 0 | Status: DISCONTINUED | OUTPATIENT
Start: 2024-01-01 | End: 2024-01-01

## 2024-01-01 RX ORDER — MIDAZOLAM IN 0.9 % SOD.CHLORID 1 MG/ML
5 PLASTIC BAG, INJECTION (ML) INTRAVENOUS ONCE
Refills: 0 | Status: DISCONTINUED | OUTPATIENT
Start: 2024-01-01 | End: 2024-01-01

## 2024-01-01 RX ORDER — TRAMADOL HYDROCHLORIDE 50 MG/1
1 TABLET, FILM COATED ORAL
Refills: 0 | DISCHARGE

## 2024-01-01 RX ORDER — METRONIDAZOLE 250 MG
500 TABLET ORAL EVERY 8 HOURS
Refills: 0 | Status: DISCONTINUED | OUTPATIENT
Start: 2024-01-01 | End: 2024-01-01

## 2024-01-01 RX ORDER — HYDROMORPHONE/SOD CHLOR,ISO/PF 2 MG/10 ML
0.5 SYRINGE (ML) INJECTION EVERY 4 HOURS
Refills: 0 | Status: DISCONTINUED | OUTPATIENT
Start: 2024-01-01 | End: 2024-01-01

## 2024-01-01 RX ORDER — LACOSAMIDE 150 MG/1
TABLET, FILM COATED ORAL
Refills: 0 | Status: DISCONTINUED | OUTPATIENT
Start: 2024-01-01 | End: 2024-01-01

## 2024-01-01 RX ORDER — MIDODRINE HYDROCHLORIDE 5 MG/1
5 TABLET ORAL THREE TIMES A DAY
Refills: 0 | Status: DISCONTINUED | OUTPATIENT
Start: 2024-01-01 | End: 2024-01-01

## 2024-01-01 RX ORDER — SENNA 187 MG
2 TABLET ORAL AT BEDTIME
Refills: 0 | Status: DISCONTINUED | OUTPATIENT
Start: 2024-01-01 | End: 2024-01-01

## 2024-01-01 RX ORDER — METHYLPREDNISOLONE ACETATE 80 MG/ML
60 INJECTION, SUSPENSION INTRA-ARTICULAR; INTRALESIONAL; INTRAMUSCULAR; SOFT TISSUE ONCE
Refills: 0 | Status: COMPLETED | OUTPATIENT
Start: 2024-01-01 | End: 2024-01-01

## 2024-01-01 RX ORDER — CEFEPIME 2 G/20ML
2000 INJECTION, POWDER, FOR SOLUTION INTRAVENOUS ONCE
Refills: 0 | Status: COMPLETED | OUTPATIENT
Start: 2024-01-01 | End: 2024-01-01

## 2024-01-01 RX ORDER — DEXTROSE 50 % IN WATER 50 %
50 SYRINGE (ML) INTRAVENOUS ONCE
Refills: 0 | Status: COMPLETED | OUTPATIENT
Start: 2024-01-01 | End: 2024-01-01

## 2024-01-01 RX ORDER — SODIUM ZIRCONIUM CYCLOSILICATE 5 G/5G
10 POWDER, FOR SUSPENSION ORAL ONCE
Refills: 0 | Status: COMPLETED | OUTPATIENT
Start: 2024-01-01 | End: 2024-01-01

## 2024-01-01 RX ORDER — EPOETIN ALFA 10000 [IU]/ML
50 SOLUTION INTRAVENOUS; SUBCUTANEOUS
Refills: 0 | DISCHARGE

## 2024-01-01 RX ORDER — ACETAMINOPHEN 500 MG/5ML
1000 LIQUID (ML) ORAL ONCE
Refills: 0 | Status: COMPLETED | OUTPATIENT
Start: 2024-01-01 | End: 2024-01-01

## 2024-01-01 RX ORDER — FERROUS SULFATE 137(45) MG
325 TABLET, EXTENDED RELEASE ORAL DAILY
Refills: 0 | Status: DISCONTINUED | OUTPATIENT
Start: 2024-01-01 | End: 2024-01-01

## 2024-01-01 RX ORDER — LACTULOSE 10 G/15ML
20 SOLUTION ORAL EVERY 4 HOURS
Refills: 0 | Status: DISCONTINUED | OUTPATIENT
Start: 2024-01-01 | End: 2024-01-01

## 2024-01-01 RX ORDER — LACTULOSE 10 G/15ML
200 SOLUTION ORAL ONCE
Refills: 0 | Status: DISCONTINUED | OUTPATIENT
Start: 2024-01-01 | End: 2024-01-01

## 2024-01-01 RX ORDER — SILVER SULFADIAZINE 1 %
1 CREAM (GRAM) TOPICAL DAILY
Refills: 0 | Status: DISCONTINUED | OUTPATIENT
Start: 2024-01-01 | End: 2024-01-01

## 2024-01-01 RX ORDER — LACTULOSE 10 G/15ML
20 SOLUTION ORAL ONCE
Refills: 0 | Status: COMPLETED | OUTPATIENT
Start: 2024-01-01 | End: 2024-01-01

## 2024-01-01 RX ORDER — SIMETHICONE 80 MG
1 TABLET,CHEWABLE ORAL
Refills: 0 | DISCHARGE

## 2024-01-01 RX ORDER — DAPTOMYCIN 500 MG/10ML
1000 INJECTION, POWDER, LYOPHILIZED, FOR SOLUTION INTRAVENOUS
Refills: 0 | Status: COMPLETED | OUTPATIENT
Start: 2024-01-01 | End: 2024-01-01

## 2024-01-01 RX ORDER — LORAZEPAM 4 MG/ML
0.5 VIAL (ML) INJECTION EVERY 4 HOURS
Refills: 0 | Status: DISCONTINUED | OUTPATIENT
Start: 2024-01-01 | End: 2024-01-01

## 2024-01-01 RX ORDER — LACTULOSE 10 G/15ML
200 SOLUTION ORAL ONCE
Refills: 0 | Status: COMPLETED | OUTPATIENT
Start: 2024-01-01 | End: 2024-01-01

## 2024-01-01 RX ORDER — MEROPENEM 1 G/30ML
500 INJECTION INTRAVENOUS ONCE
Refills: 0 | Status: DISCONTINUED | OUTPATIENT
Start: 2024-01-01 | End: 2024-01-01

## 2024-01-01 RX ORDER — SCOPOLAMINE 1 MG/3D
1 PATCH, EXTENDED RELEASE TRANSDERMAL ONCE
Refills: 0 | Status: COMPLETED | OUTPATIENT
Start: 2024-01-01 | End: 2024-01-01

## 2024-01-01 RX ORDER — HEPARIN SODIUM 1000 [USP'U]/ML
5000 INJECTION INTRAVENOUS; SUBCUTANEOUS EVERY 12 HOURS
Refills: 0 | Status: DISCONTINUED | OUTPATIENT
Start: 2024-01-01 | End: 2024-01-01

## 2024-01-01 RX ORDER — SODIUM ZIRCONIUM CYCLOSILICATE 5 G/5G
10 POWDER, FOR SUSPENSION ORAL EVERY 8 HOURS
Refills: 0 | Status: DISCONTINUED | OUTPATIENT
Start: 2024-01-01 | End: 2024-01-01

## 2024-01-01 RX ORDER — HYDROMORPHONE/SOD CHLOR,ISO/PF 2 MG/10 ML
0.2 SYRINGE (ML) INJECTION
Refills: 0 | Status: DISCONTINUED | OUTPATIENT
Start: 2024-01-01 | End: 2024-01-01

## 2024-01-01 RX ORDER — LACOSAMIDE 150 MG/1
50 TABLET, FILM COATED ORAL
Refills: 0 | Status: DISCONTINUED | OUTPATIENT
Start: 2024-01-01 | End: 2024-01-01

## 2024-01-01 RX ORDER — SUCRALFATE 1 G
1 TABLET ORAL EVERY 12 HOURS
Refills: 0 | Status: DISCONTINUED | OUTPATIENT
Start: 2024-01-01 | End: 2024-01-01

## 2024-01-01 RX ORDER — HYDROCORTISONE 10 MG/G
1 CREAM TOPICAL
Refills: 0 | Status: DISCONTINUED | OUTPATIENT
Start: 2024-01-01 | End: 2024-01-01

## 2024-01-01 RX ORDER — SODIUM CHLORIDE 9 G/1000ML
1000 INJECTION, SOLUTION INTRAVENOUS
Refills: 0 | Status: DISCONTINUED | OUTPATIENT
Start: 2024-01-01 | End: 2024-01-01

## 2024-01-01 RX ORDER — LACOSAMIDE 150 MG/1
50 TABLET, FILM COATED ORAL EVERY 12 HOURS
Refills: 0 | Status: DISCONTINUED | OUTPATIENT
Start: 2024-01-01 | End: 2024-01-01

## 2024-01-01 RX ORDER — SUCRALFATE 1 G
1 TABLET ORAL
Refills: 0 | Status: DISCONTINUED | OUTPATIENT
Start: 2024-01-01 | End: 2024-01-01

## 2024-01-01 RX ORDER — LACOSAMIDE 150 MG/1
100 TABLET, FILM COATED ORAL ONCE
Refills: 0 | Status: DISCONTINUED | OUTPATIENT
Start: 2024-01-01 | End: 2024-01-01

## 2024-01-01 RX ORDER — METOPROLOL SUCCINATE 50 MG/1
25 TABLET, EXTENDED RELEASE ORAL EVERY 6 HOURS
Refills: 0 | Status: DISCONTINUED | OUTPATIENT
Start: 2024-01-01 | End: 2024-01-01

## 2024-01-01 RX ORDER — ACETAMINOPHEN 500 MG/5ML
650 LIQUID (ML) ORAL EVERY 8 HOURS
Refills: 0 | Status: DISCONTINUED | OUTPATIENT
Start: 2024-01-01 | End: 2024-01-01

## 2024-01-01 RX ORDER — METRONIDAZOLE 250 MG
TABLET ORAL
Refills: 0 | Status: DISCONTINUED | OUTPATIENT
Start: 2024-01-01 | End: 2024-01-01

## 2024-01-01 RX ORDER — COLLAGENASE CLOSTRIDIUM HISTOLYTICUM 0.9 MG
250 KIT INJECTION
Refills: 0 | DISCHARGE

## 2024-01-01 RX ORDER — CALCIUM GLUCONATE 20 MG/ML
2 INJECTION, SOLUTION INTRAVENOUS ONCE
Refills: 0 | Status: COMPLETED | OUTPATIENT
Start: 2024-01-01 | End: 2024-01-01

## 2024-01-01 RX ORDER — SODIUM CHLORIDE 9 G/1000ML
500 INJECTION, SOLUTION INTRAVENOUS ONCE
Refills: 0 | Status: COMPLETED | OUTPATIENT
Start: 2024-01-01 | End: 2024-01-01

## 2024-01-01 RX ORDER — POLYETHYLENE GLYCOL 3350 17 G/17G
17 POWDER, FOR SOLUTION ORAL EVERY 12 HOURS
Refills: 0 | Status: DISCONTINUED | OUTPATIENT
Start: 2024-01-01 | End: 2024-01-01

## 2024-01-01 RX ORDER — COLLAGENASE CLOSTRIDIUM HIST. 250 UNIT/G
1 OINTMENT (GRAM) TOPICAL DAILY
Refills: 0 | Status: DISCONTINUED | OUTPATIENT
Start: 2024-01-01 | End: 2024-01-01

## 2024-01-01 RX ORDER — MINERAL OIL
133 OIL (ML) MISCELLANEOUS ONCE
Refills: 0 | Status: COMPLETED | OUTPATIENT
Start: 2024-01-01 | End: 2024-01-01

## 2024-01-01 RX ORDER — SODIUM CHLORIDE 9 G/1000ML
250 INJECTION, SOLUTION INTRAVENOUS ONCE
Refills: 0 | Status: COMPLETED | OUTPATIENT
Start: 2024-01-01 | End: 2024-01-01

## 2024-01-01 RX ORDER — CIPROFLOXACIN HCL 250 MG
200 TABLET ORAL EVERY 12 HOURS
Refills: 0 | Status: DISCONTINUED | OUTPATIENT
Start: 2024-01-01 | End: 2024-01-01

## 2024-01-01 RX ADMIN — LACTULOSE 20 GRAM(S): 10 SOLUTION ORAL at 11:43

## 2024-01-01 RX ADMIN — SODIUM CHLORIDE 75 MILLILITER(S): 9 INJECTION, SOLUTION INTRAVENOUS at 12:45

## 2024-01-01 RX ADMIN — Medication 10 MILLIGRAM(S): at 12:00

## 2024-01-01 RX ADMIN — Medication 1 APPLICATION(S): at 11:54

## 2024-01-01 RX ADMIN — Medication 0.5 MILLIGRAM(S): at 15:20

## 2024-01-01 RX ADMIN — LACOSAMIDE 100 MILLIGRAM(S): 150 TABLET, FILM COATED ORAL at 12:23

## 2024-01-01 RX ADMIN — AMIODARONE HYDROCHLORIDE 600 MILLIGRAM(S): 50 INJECTION, SOLUTION INTRAVENOUS at 14:06

## 2024-01-01 RX ADMIN — MIDODRINE HYDROCHLORIDE 10 MILLIGRAM(S): 5 TABLET ORAL at 05:46

## 2024-01-01 RX ADMIN — MIDODRINE HYDROCHLORIDE 5 MILLIGRAM(S): 5 TABLET ORAL at 17:45

## 2024-01-01 RX ADMIN — HEPARIN SODIUM 5000 UNIT(S): 1000 INJECTION INTRAVENOUS; SUBCUTANEOUS at 05:07

## 2024-01-01 RX ADMIN — METOPROLOL SUCCINATE 25 MILLIGRAM(S): 50 TABLET, EXTENDED RELEASE ORAL at 17:41

## 2024-01-01 RX ADMIN — METOPROLOL SUCCINATE 25 MILLIGRAM(S): 50 TABLET, EXTENDED RELEASE ORAL at 05:31

## 2024-01-01 RX ADMIN — HYDROCORTISONE 1 APPLICATION(S): 10 CREAM TOPICAL at 17:17

## 2024-01-01 RX ADMIN — HYDROCORTISONE 1 APPLICATION(S): 10 CREAM TOPICAL at 17:32

## 2024-01-01 RX ADMIN — HEPARIN SODIUM 5000 UNIT(S): 1000 INJECTION INTRAVENOUS; SUBCUTANEOUS at 05:45

## 2024-01-01 RX ADMIN — LACTULOSE 20 GRAM(S): 10 SOLUTION ORAL at 21:39

## 2024-01-01 RX ADMIN — Medication 1 APPLICATION(S): at 05:17

## 2024-01-01 RX ADMIN — HYDROCORTISONE 1 APPLICATION(S): 10 CREAM TOPICAL at 06:23

## 2024-01-01 RX ADMIN — HEPARIN SODIUM 5000 UNIT(S): 1000 INJECTION INTRAVENOUS; SUBCUTANEOUS at 05:13

## 2024-01-01 RX ADMIN — HYDROCORTISONE 1 APPLICATION(S): 10 CREAM TOPICAL at 17:56

## 2024-01-01 RX ADMIN — SODIUM CHLORIDE 75 MILLILITER(S): 9 INJECTION, SOLUTION INTRAVENOUS at 17:50

## 2024-01-01 RX ADMIN — LACTULOSE 20 GRAM(S): 10 SOLUTION ORAL at 21:34

## 2024-01-01 RX ADMIN — HYDROCORTISONE 1 APPLICATION(S): 10 CREAM TOPICAL at 05:20

## 2024-01-01 RX ADMIN — Medication 1 APPLICATION(S): at 11:56

## 2024-01-01 RX ADMIN — ERYTHROPOIETIN 30000 UNIT(S): 10000 INJECTION, SOLUTION INTRAVENOUS; SUBCUTANEOUS at 15:22

## 2024-01-01 RX ADMIN — Medication 10 MILLIGRAM(S): at 11:59

## 2024-01-01 RX ADMIN — Medication 1 APPLICATION(S): at 05:37

## 2024-01-01 RX ADMIN — HEPARIN SODIUM 5000 UNIT(S): 1000 INJECTION INTRAVENOUS; SUBCUTANEOUS at 17:21

## 2024-01-01 RX ADMIN — IRON SUCROSE 200 MILLIGRAM(S): 20 INJECTION, SOLUTION INTRAVENOUS at 16:30

## 2024-01-01 RX ADMIN — HEPARIN SODIUM 5000 UNIT(S): 1000 INJECTION INTRAVENOUS; SUBCUTANEOUS at 05:32

## 2024-01-01 RX ADMIN — SODIUM CHLORIDE 75 MILLILITER(S): 9 INJECTION, SOLUTION INTRAVENOUS at 11:12

## 2024-01-01 RX ADMIN — LACTULOSE 20 GRAM(S): 10 SOLUTION ORAL at 10:43

## 2024-01-01 RX ADMIN — Medication 0.5 MILLIGRAM(S): at 17:19

## 2024-01-01 RX ADMIN — HEPARIN SODIUM 5000 UNIT(S): 1000 INJECTION INTRAVENOUS; SUBCUTANEOUS at 17:29

## 2024-01-01 RX ADMIN — HYDROCORTISONE 1 APPLICATION(S): 10 CREAM TOPICAL at 17:50

## 2024-01-01 RX ADMIN — LACTULOSE 20 GRAM(S): 10 SOLUTION ORAL at 03:58

## 2024-01-01 RX ADMIN — CEFEPIME 100 MILLIGRAM(S): 2 INJECTION, POWDER, FOR SOLUTION INTRAVENOUS at 11:53

## 2024-01-01 RX ADMIN — DAPTOMYCIN 140 MILLIGRAM(S): 500 INJECTION, POWDER, LYOPHILIZED, FOR SOLUTION INTRAVENOUS at 17:15

## 2024-01-01 RX ADMIN — HYDROCORTISONE 1 APPLICATION(S): 10 CREAM TOPICAL at 17:40

## 2024-01-01 RX ADMIN — Medication 40 MILLIGRAM(S): at 06:12

## 2024-01-01 RX ADMIN — MIDODRINE HYDROCHLORIDE 10 MILLIGRAM(S): 5 TABLET ORAL at 17:30

## 2024-01-01 RX ADMIN — Medication 10 MILLIGRAM(S): at 11:23

## 2024-01-01 RX ADMIN — Medication 1 GRAM(S): at 05:38

## 2024-01-01 RX ADMIN — Medication 1 APPLICATION(S): at 06:55

## 2024-01-01 RX ADMIN — Medication 1 GRAM(S): at 17:40

## 2024-01-01 RX ADMIN — MIDODRINE HYDROCHLORIDE 10 MILLIGRAM(S): 5 TABLET ORAL at 17:19

## 2024-01-01 RX ADMIN — LACTULOSE 20 GRAM(S): 10 SOLUTION ORAL at 21:53

## 2024-01-01 RX ADMIN — Medication 10 MILLIGRAM(S): at 11:11

## 2024-01-01 RX ADMIN — LACOSAMIDE 100 MILLIGRAM(S): 150 TABLET, FILM COATED ORAL at 14:48

## 2024-01-01 RX ADMIN — IRON SUCROSE 220 MILLIGRAM(S): 20 INJECTION, SOLUTION INTRAVENOUS at 12:57

## 2024-01-01 RX ADMIN — METOPROLOL SUCCINATE 2.5 MILLIGRAM(S): 50 TABLET, EXTENDED RELEASE ORAL at 21:52

## 2024-01-01 RX ADMIN — LACOSAMIDE 100 MILLIGRAM(S): 150 TABLET, FILM COATED ORAL at 03:00

## 2024-01-01 RX ADMIN — LACTULOSE 20 GRAM(S): 10 SOLUTION ORAL at 17:38

## 2024-01-01 RX ADMIN — Medication 0.2 MILLIGRAM(S): at 15:40

## 2024-01-01 RX ADMIN — Medication 1 GRAM(S): at 05:01

## 2024-01-01 RX ADMIN — LACOSAMIDE 50 MILLIGRAM(S): 150 TABLET, FILM COATED ORAL at 17:32

## 2024-01-01 RX ADMIN — Medication 1 APPLICATION(S): at 05:42

## 2024-01-01 RX ADMIN — SODIUM ZIRCONIUM CYCLOSILICATE 10 GRAM(S): 5 POWDER, FOR SUSPENSION ORAL at 12:23

## 2024-01-01 RX ADMIN — Medication 10 MILLIGRAM(S): at 22:33

## 2024-01-01 RX ADMIN — SODIUM CHLORIDE 75 MILLILITER(S): 9 INJECTION, SOLUTION INTRAVENOUS at 07:18

## 2024-01-01 RX ADMIN — HYDROCORTISONE 1 APPLICATION(S): 10 CREAM TOPICAL at 06:08

## 2024-01-01 RX ADMIN — HYDROCORTISONE 1 APPLICATION(S): 10 CREAM TOPICAL at 18:01

## 2024-01-01 RX ADMIN — Medication 100 MILLIGRAM(S): at 08:13

## 2024-01-01 RX ADMIN — HEPARIN SODIUM 5000 UNIT(S): 1000 INJECTION INTRAVENOUS; SUBCUTANEOUS at 17:00

## 2024-01-01 RX ADMIN — LACTULOSE 200 GRAM(S): 10 SOLUTION ORAL at 11:22

## 2024-01-01 RX ADMIN — AMIODARONE HYDROCHLORIDE 33.3 MG/MIN: 50 INJECTION, SOLUTION INTRAVENOUS at 14:15

## 2024-01-01 RX ADMIN — Medication 1 APPLICATION(S): at 05:28

## 2024-01-01 RX ADMIN — METOPROLOL SUCCINATE 2.5 MILLIGRAM(S): 50 TABLET, EXTENDED RELEASE ORAL at 14:35

## 2024-01-01 RX ADMIN — HYDROCORTISONE 1 APPLICATION(S): 10 CREAM TOPICAL at 17:20

## 2024-01-01 RX ADMIN — HYDROCORTISONE 1 APPLICATION(S): 10 CREAM TOPICAL at 06:13

## 2024-01-01 RX ADMIN — LACOSAMIDE 100 MILLIGRAM(S): 150 TABLET, FILM COATED ORAL at 14:08

## 2024-01-01 RX ADMIN — HEPARIN SODIUM 5000 UNIT(S): 1000 INJECTION INTRAVENOUS; SUBCUTANEOUS at 06:13

## 2024-01-01 RX ADMIN — Medication 40 MILLIGRAM(S): at 17:55

## 2024-01-01 RX ADMIN — HYDROCORTISONE 1 APPLICATION(S): 10 CREAM TOPICAL at 05:28

## 2024-01-01 RX ADMIN — LACTULOSE 20 GRAM(S): 10 SOLUTION ORAL at 05:37

## 2024-01-01 RX ADMIN — LACTULOSE 20 GRAM(S): 10 SOLUTION ORAL at 17:41

## 2024-01-01 RX ADMIN — Medication 0.2 MILLIGRAM(S): at 12:35

## 2024-01-01 RX ADMIN — LACOSAMIDE 50 MILLIGRAM(S): 150 TABLET, FILM COATED ORAL at 05:02

## 2024-01-01 RX ADMIN — Medication 40 MILLIGRAM(S): at 11:51

## 2024-01-01 RX ADMIN — DAPTOMYCIN 140 MILLIGRAM(S): 500 INJECTION, POWDER, LYOPHILIZED, FOR SOLUTION INTRAVENOUS at 17:33

## 2024-01-01 RX ADMIN — Medication 0.5 MILLIGRAM(S): at 21:45

## 2024-01-01 RX ADMIN — LACTULOSE 20 GRAM(S): 10 SOLUTION ORAL at 11:13

## 2024-01-01 RX ADMIN — Medication 1 APPLICATION(S): at 11:12

## 2024-01-01 RX ADMIN — LACTULOSE 20 GRAM(S): 10 SOLUTION ORAL at 14:34

## 2024-01-01 RX ADMIN — Medication 650 MILLIGRAM(S): at 00:50

## 2024-01-01 RX ADMIN — LACTULOSE 20 GRAM(S): 10 SOLUTION ORAL at 05:13

## 2024-01-01 RX ADMIN — HEPARIN SODIUM 5000 UNIT(S): 1000 INJECTION INTRAVENOUS; SUBCUTANEOUS at 06:37

## 2024-01-01 RX ADMIN — LACTULOSE 20 GRAM(S): 10 SOLUTION ORAL at 17:45

## 2024-01-01 RX ADMIN — Medication 0.2 MILLIGRAM(S): at 23:16

## 2024-01-01 RX ADMIN — Medication 50 MILLILITER(S): at 08:26

## 2024-01-01 RX ADMIN — Medication 10 MILLIGRAM(S): at 21:06

## 2024-01-01 RX ADMIN — HEPARIN SODIUM 5000 UNIT(S): 1000 INJECTION INTRAVENOUS; SUBCUTANEOUS at 17:40

## 2024-01-01 RX ADMIN — Medication 1 MILLIGRAM(S): at 21:40

## 2024-01-01 RX ADMIN — MIDODRINE HYDROCHLORIDE 5 MILLIGRAM(S): 5 TABLET ORAL at 12:38

## 2024-01-01 RX ADMIN — Medication 1 APPLICATION(S): at 13:35

## 2024-01-01 RX ADMIN — HEPARIN SODIUM 5000 UNIT(S): 1000 INJECTION INTRAVENOUS; SUBCUTANEOUS at 05:36

## 2024-01-01 RX ADMIN — HEPARIN SODIUM 5000 UNIT(S): 1000 INJECTION INTRAVENOUS; SUBCUTANEOUS at 17:30

## 2024-01-01 RX ADMIN — LACTULOSE 20 GRAM(S): 10 SOLUTION ORAL at 04:36

## 2024-01-01 RX ADMIN — Medication 1 APPLICATION(S): at 11:19

## 2024-01-01 RX ADMIN — Medication 40 MILLIGRAM(S): at 05:28

## 2024-01-01 RX ADMIN — Medication 1 APPLICATION(S): at 06:09

## 2024-01-01 RX ADMIN — LACOSAMIDE 50 MILLIGRAM(S): 150 TABLET, FILM COATED ORAL at 17:00

## 2024-01-01 RX ADMIN — DAPTOMYCIN 140 MILLIGRAM(S): 500 INJECTION, POWDER, LYOPHILIZED, FOR SOLUTION INTRAVENOUS at 18:00

## 2024-01-01 RX ADMIN — DAPTOMYCIN 140 MILLIGRAM(S): 500 INJECTION, POWDER, LYOPHILIZED, FOR SOLUTION INTRAVENOUS at 18:14

## 2024-01-01 RX ADMIN — EPOETIN ALFA 30000 UNIT(S): 3000 SOLUTION INTRAVENOUS; SUBCUTANEOUS at 14:57

## 2024-01-01 RX ADMIN — Medication 40 MILLIGRAM(S): at 05:20

## 2024-01-01 RX ADMIN — SODIUM CHLORIDE 75 MILLILITER(S): 9 INJECTION, SOLUTION INTRAVENOUS at 22:07

## 2024-01-01 RX ADMIN — IRON SUCROSE 220 MILLIGRAM(S): 20 INJECTION, SOLUTION INTRAVENOUS at 14:22

## 2024-01-01 RX ADMIN — Medication 1 APPLICATION(S): at 07:22

## 2024-01-01 RX ADMIN — LACTULOSE 20 GRAM(S): 10 SOLUTION ORAL at 06:35

## 2024-01-01 RX ADMIN — HYDROCORTISONE 1 APPLICATION(S): 10 CREAM TOPICAL at 05:40

## 2024-01-01 RX ADMIN — Medication 40 MILLIGRAM(S): at 16:31

## 2024-01-01 RX ADMIN — HEPARIN SODIUM 5000 UNIT(S): 1000 INJECTION INTRAVENOUS; SUBCUTANEOUS at 07:18

## 2024-01-01 RX ADMIN — SCOPOLAMINE 1 PATCH: 1 PATCH, EXTENDED RELEASE TRANSDERMAL at 19:21

## 2024-01-01 RX ADMIN — LACTULOSE 20 GRAM(S): 10 SOLUTION ORAL at 17:43

## 2024-01-01 RX ADMIN — Medication 10 UNIT(S): at 08:26

## 2024-01-01 RX ADMIN — Medication 1 APPLICATION(S): at 11:53

## 2024-01-01 RX ADMIN — HEPARIN SODIUM 5000 UNIT(S): 1000 INJECTION INTRAVENOUS; SUBCUTANEOUS at 05:20

## 2024-01-01 RX ADMIN — HYDROCORTISONE 1 APPLICATION(S): 10 CREAM TOPICAL at 17:41

## 2024-01-01 RX ADMIN — HYDROCORTISONE 1 APPLICATION(S): 10 CREAM TOPICAL at 05:38

## 2024-01-01 RX ADMIN — LACTULOSE 20 GRAM(S): 10 SOLUTION ORAL at 13:41

## 2024-01-01 RX ADMIN — HYDROCORTISONE 1 APPLICATION(S): 10 CREAM TOPICAL at 07:23

## 2024-01-01 RX ADMIN — Medication 1 GRAM(S): at 17:32

## 2024-01-01 RX ADMIN — Medication 650 MILLIGRAM(S): at 21:40

## 2024-01-01 RX ADMIN — LACTULOSE 20 GRAM(S): 10 SOLUTION ORAL at 22:33

## 2024-01-01 RX ADMIN — Medication 1 APPLICATION(S): at 11:25

## 2024-01-01 RX ADMIN — HYDROCORTISONE 1 APPLICATION(S): 10 CREAM TOPICAL at 06:32

## 2024-01-01 RX ADMIN — CEFTRIAXONE 100 MILLIGRAM(S): 500 INJECTION, POWDER, FOR SOLUTION INTRAMUSCULAR; INTRAVENOUS at 11:32

## 2024-01-01 RX ADMIN — CALCIUM GLUCONATE 200 GRAM(S): 20 INJECTION, SOLUTION INTRAVENOUS at 10:10

## 2024-01-01 RX ADMIN — MIDODRINE HYDROCHLORIDE 5 MILLIGRAM(S): 5 TABLET ORAL at 11:23

## 2024-01-01 RX ADMIN — LACTULOSE 20 GRAM(S): 10 SOLUTION ORAL at 23:07

## 2024-01-01 RX ADMIN — LACOSAMIDE 50 MILLIGRAM(S): 150 TABLET, FILM COATED ORAL at 05:38

## 2024-01-01 RX ADMIN — Medication 1 GRAM(S): at 05:31

## 2024-01-01 RX ADMIN — METHYLPREDNISOLONE ACETATE 60 MILLIGRAM(S): 80 INJECTION, SUSPENSION INTRA-ARTICULAR; INTRALESIONAL; INTRAMUSCULAR; SOFT TISSUE at 13:16

## 2024-01-01 RX ADMIN — LACOSAMIDE 50 MILLIGRAM(S): 150 TABLET, FILM COATED ORAL at 05:27

## 2024-01-01 RX ADMIN — HEPARIN SODIUM 5000 UNIT(S): 1000 INJECTION INTRAVENOUS; SUBCUTANEOUS at 05:23

## 2024-01-01 RX ADMIN — MIDODRINE HYDROCHLORIDE 5 MILLIGRAM(S): 5 TABLET ORAL at 11:11

## 2024-01-01 RX ADMIN — MIDODRINE HYDROCHLORIDE 10 MILLIGRAM(S): 5 TABLET ORAL at 06:07

## 2024-01-01 RX ADMIN — Medication 0.2 MILLIGRAM(S): at 12:54

## 2024-01-01 RX ADMIN — ERYTHROPOIETIN 30000 UNIT(S): 10000 INJECTION, SOLUTION INTRAVENOUS; SUBCUTANEOUS at 13:21

## 2024-01-01 RX ADMIN — LACOSAMIDE 50 MILLIGRAM(S): 150 TABLET, FILM COATED ORAL at 05:12

## 2024-01-01 RX ADMIN — MIDODRINE HYDROCHLORIDE 10 MILLIGRAM(S): 5 TABLET ORAL at 05:13

## 2024-01-01 RX ADMIN — HEPARIN SODIUM 5000 UNIT(S): 1000 INJECTION INTRAVENOUS; SUBCUTANEOUS at 05:28

## 2024-01-01 RX ADMIN — HEPARIN SODIUM 5000 UNIT(S): 1000 INJECTION INTRAVENOUS; SUBCUTANEOUS at 17:33

## 2024-01-01 RX ADMIN — Medication 10 MILLIGRAM(S): at 11:54

## 2024-01-01 RX ADMIN — LACTULOSE 20 GRAM(S): 10 SOLUTION ORAL at 05:01

## 2024-01-01 RX ADMIN — MIDODRINE HYDROCHLORIDE 10 MILLIGRAM(S): 5 TABLET ORAL at 11:54

## 2024-01-01 RX ADMIN — Medication 1 APPLICATION(S): at 12:49

## 2024-01-01 RX ADMIN — MIDODRINE HYDROCHLORIDE 5 MILLIGRAM(S): 5 TABLET ORAL at 17:30

## 2024-01-01 RX ADMIN — Medication 40 MILLIGRAM(S): at 05:36

## 2024-01-01 RX ADMIN — Medication 0.2 MILLIGRAM(S): at 12:50

## 2024-01-01 RX ADMIN — MIDODRINE HYDROCHLORIDE 5 MILLIGRAM(S): 5 TABLET ORAL at 05:30

## 2024-01-01 RX ADMIN — LACTULOSE 20 GRAM(S): 10 SOLUTION ORAL at 17:31

## 2024-01-01 RX ADMIN — Medication 1 GRAM(S): at 17:00

## 2024-01-01 RX ADMIN — LACTULOSE 20 GRAM(S): 10 SOLUTION ORAL at 06:12

## 2024-01-01 RX ADMIN — Medication 0.2 MILLIGRAM(S): at 16:20

## 2024-01-01 RX ADMIN — HEPARIN SODIUM 5000 UNIT(S): 1000 INJECTION INTRAVENOUS; SUBCUTANEOUS at 17:41

## 2024-01-01 RX ADMIN — LACTULOSE 20 GRAM(S): 10 SOLUTION ORAL at 10:42

## 2024-01-01 RX ADMIN — Medication 1 APPLICATION(S): at 12:39

## 2024-01-01 RX ADMIN — MIDODRINE HYDROCHLORIDE 5 MILLIGRAM(S): 5 TABLET ORAL at 17:50

## 2024-01-01 RX ADMIN — MIDODRINE HYDROCHLORIDE 10 MILLIGRAM(S): 5 TABLET ORAL at 17:43

## 2024-01-01 RX ADMIN — SODIUM CHLORIDE 500 MILLILITER(S): 9 INJECTION, SOLUTION INTRAVENOUS at 12:08

## 2024-01-01 RX ADMIN — SODIUM CHLORIDE 500 MILLILITER(S): 9 INJECTION, SOLUTION INTRAVENOUS at 13:16

## 2024-01-01 RX ADMIN — Medication 0.5 MILLIGRAM(S): at 16:59

## 2024-01-01 RX ADMIN — SCOPOLAMINE 1 PATCH: 1 PATCH, EXTENDED RELEASE TRANSDERMAL at 15:20

## 2024-01-01 RX ADMIN — Medication 10 MILLIGRAM(S): at 11:47

## 2024-01-01 RX ADMIN — Medication 0.5 MILLIGRAM(S): at 21:47

## 2024-01-01 RX ADMIN — LACTULOSE 20 GRAM(S): 10 SOLUTION ORAL at 02:17

## 2024-01-01 RX ADMIN — Medication 1 APPLICATION(S): at 13:49

## 2024-01-01 RX ADMIN — LACOSAMIDE 50 MILLIGRAM(S): 150 TABLET, FILM COATED ORAL at 18:30

## 2024-01-01 RX ADMIN — LACTULOSE 20 GRAM(S): 10 SOLUTION ORAL at 11:53

## 2024-01-01 RX ADMIN — HEPARIN SODIUM 5000 UNIT(S): 1000 INJECTION INTRAVENOUS; SUBCUTANEOUS at 17:32

## 2024-01-01 RX ADMIN — Medication 1 GRAM(S): at 06:13

## 2024-01-01 RX ADMIN — Medication 1 APPLICATION(S): at 11:55

## 2024-01-01 RX ADMIN — Medication 1 GRAM(S): at 17:30

## 2024-01-01 RX ADMIN — Medication 50 MILLILITER(S): at 13:42

## 2024-01-01 RX ADMIN — Medication 40 MILLIGRAM(S): at 18:03

## 2024-01-01 RX ADMIN — Medication 40 MILLIGRAM(S): at 05:14

## 2024-01-01 RX ADMIN — LACTULOSE 20 GRAM(S): 10 SOLUTION ORAL at 23:39

## 2024-01-01 RX ADMIN — LACTULOSE 20 GRAM(S): 10 SOLUTION ORAL at 05:27

## 2024-01-01 RX ADMIN — HEPARIN SODIUM 5000 UNIT(S): 1000 INJECTION INTRAVENOUS; SUBCUTANEOUS at 17:44

## 2024-01-01 RX ADMIN — IRON SUCROSE 100 MILLIGRAM(S): 20 INJECTION, SOLUTION INTRAVENOUS at 16:00

## 2024-01-01 RX ADMIN — MIDODRINE HYDROCHLORIDE 10 MILLIGRAM(S): 5 TABLET ORAL at 12:00

## 2024-01-01 RX ADMIN — ERYTHROPOIETIN 30000 UNIT(S): 10000 INJECTION, SOLUTION INTRAVENOUS; SUBCUTANEOUS at 12:57

## 2024-01-01 RX ADMIN — MIDODRINE HYDROCHLORIDE 10 MILLIGRAM(S): 5 TABLET ORAL at 17:15

## 2024-01-01 RX ADMIN — Medication 650 MILLIGRAM(S): at 18:33

## 2024-01-01 RX ADMIN — Medication 1 APPLICATION(S): at 08:10

## 2024-01-01 RX ADMIN — MIDODRINE HYDROCHLORIDE 10 MILLIGRAM(S): 5 TABLET ORAL at 05:23

## 2024-01-01 RX ADMIN — Medication 1 APPLICATION(S): at 17:31

## 2024-01-01 RX ADMIN — Medication 400 MILLIGRAM(S): at 04:00

## 2024-01-01 RX ADMIN — LACOSAMIDE 50 MILLIGRAM(S): 150 TABLET, FILM COATED ORAL at 05:46

## 2024-01-01 RX ADMIN — HYDROCORTISONE 1 APPLICATION(S): 10 CREAM TOPICAL at 05:45

## 2024-01-01 RX ADMIN — Medication 40 MILLIGRAM(S): at 14:05

## 2024-01-01 RX ADMIN — HYDROCORTISONE 1 APPLICATION(S): 10 CREAM TOPICAL at 05:23

## 2024-01-01 RX ADMIN — LACOSAMIDE 50 MILLIGRAM(S): 150 TABLET, FILM COATED ORAL at 05:23

## 2024-01-01 RX ADMIN — HEPARIN SODIUM 5000 UNIT(S): 1000 INJECTION INTRAVENOUS; SUBCUTANEOUS at 17:50

## 2024-01-01 RX ADMIN — HEPARIN SODIUM 5000 UNIT(S): 1000 INJECTION INTRAVENOUS; SUBCUTANEOUS at 17:55

## 2024-01-01 RX ADMIN — HEPARIN SODIUM 5000 UNIT(S): 1000 INJECTION INTRAVENOUS; SUBCUTANEOUS at 06:07

## 2024-01-01 RX ADMIN — HYDROCORTISONE 1 APPLICATION(S): 10 CREAM TOPICAL at 16:57

## 2024-01-01 RX ADMIN — Medication 40 MILLIGRAM(S): at 11:59

## 2024-01-01 RX ADMIN — Medication 40 MILLIGRAM(S): at 07:18

## 2024-01-01 RX ADMIN — Medication 1 APPLICATION(S): at 05:21

## 2024-01-01 RX ADMIN — LACOSAMIDE 50 MILLIGRAM(S): 150 TABLET, FILM COATED ORAL at 05:07

## 2024-01-01 RX ADMIN — HYDROCORTISONE 1 APPLICATION(S): 10 CREAM TOPICAL at 17:28

## 2024-01-01 RX ADMIN — Medication 1 APPLICATION(S): at 11:22

## 2024-01-01 RX ADMIN — Medication 40 MILLIGRAM(S): at 11:57

## 2024-01-01 RX ADMIN — Medication 40 MILLIGRAM(S): at 17:32

## 2024-01-01 RX ADMIN — IRON SUCROSE 200 MILLIGRAM(S): 20 INJECTION, SOLUTION INTRAVENOUS at 16:36

## 2024-01-01 RX ADMIN — HYDROCORTISONE 1 APPLICATION(S): 10 CREAM TOPICAL at 17:38

## 2024-01-01 RX ADMIN — HYDROCORTISONE 1 APPLICATION(S): 10 CREAM TOPICAL at 05:33

## 2024-01-01 RX ADMIN — LACTULOSE 20 GRAM(S): 10 SOLUTION ORAL at 21:45

## 2024-01-01 RX ADMIN — Medication 2 MILLIGRAM(S): at 11:52

## 2024-01-01 RX ADMIN — HYDROCORTISONE 1 APPLICATION(S): 10 CREAM TOPICAL at 17:53

## 2024-01-01 RX ADMIN — LACTULOSE 20 GRAM(S): 10 SOLUTION ORAL at 18:31

## 2024-01-01 RX ADMIN — LACTULOSE 20 GRAM(S): 10 SOLUTION ORAL at 17:32

## 2024-01-01 RX ADMIN — LACOSAMIDE 50 MILLIGRAM(S): 150 TABLET, FILM COATED ORAL at 05:32

## 2024-01-01 RX ADMIN — HYDROCORTISONE 1 APPLICATION(S): 10 CREAM TOPICAL at 05:13

## 2024-01-01 RX ADMIN — MIDODRINE HYDROCHLORIDE 10 MILLIGRAM(S): 5 TABLET ORAL at 17:33

## 2024-01-01 RX ADMIN — MIDODRINE HYDROCHLORIDE 10 MILLIGRAM(S): 5 TABLET ORAL at 11:52

## 2024-01-01 RX ADMIN — CEFEPIME 100 MILLIGRAM(S): 2 INJECTION, POWDER, FOR SOLUTION INTRAVENOUS at 11:47

## 2024-01-01 RX ADMIN — LACTULOSE 20 GRAM(S): 10 SOLUTION ORAL at 05:32

## 2024-01-01 RX ADMIN — Medication 1 APPLICATION(S): at 05:39

## 2024-01-01 RX ADMIN — Medication 5 MILLIGRAM(S): at 10:46

## 2024-01-01 RX ADMIN — Medication 1 APPLICATION(S): at 06:35

## 2024-01-01 RX ADMIN — HYDROCORTISONE 1 APPLICATION(S): 10 CREAM TOPICAL at 17:01

## 2024-01-01 RX ADMIN — LACOSAMIDE 100 MILLIGRAM(S): 150 TABLET, FILM COATED ORAL at 02:28

## 2024-01-01 RX ADMIN — Medication 1 APPLICATION(S): at 05:51

## 2024-01-01 RX ADMIN — MIDODRINE HYDROCHLORIDE 10 MILLIGRAM(S): 5 TABLET ORAL at 11:31

## 2024-01-01 RX ADMIN — LACTULOSE 20 GRAM(S): 10 SOLUTION ORAL at 13:14

## 2024-01-01 RX ADMIN — Medication 0.2 MILLIGRAM(S): at 09:09

## 2024-01-01 RX ADMIN — DAPTOMYCIN 140 MILLIGRAM(S): 500 INJECTION, POWDER, LYOPHILIZED, FOR SOLUTION INTRAVENOUS at 16:45

## 2024-01-01 RX ADMIN — Medication 1 APPLICATION(S): at 06:23

## 2024-01-01 RX ADMIN — Medication 40 MILLIGRAM(S): at 05:01

## 2024-01-01 RX ADMIN — Medication 0.2 MILLIGRAM(S): at 04:06

## 2024-01-01 RX ADMIN — Medication 1 APPLICATION(S): at 05:04

## 2024-01-01 RX ADMIN — LACOSAMIDE 50 MILLIGRAM(S): 150 TABLET, FILM COATED ORAL at 06:35

## 2024-01-01 RX ADMIN — Medication 2 MILLIGRAM(S): at 10:48

## 2024-01-01 RX ADMIN — LACOSAMIDE 50 MILLIGRAM(S): 150 TABLET, FILM COATED ORAL at 17:40

## 2024-01-01 RX ADMIN — LACOSAMIDE 50 MILLIGRAM(S): 150 TABLET, FILM COATED ORAL at 06:12

## 2024-01-01 RX ADMIN — Medication 650 MILLIGRAM(S): at 01:20

## 2024-01-01 RX ADMIN — LACOSAMIDE 50 MILLIGRAM(S): 150 TABLET, FILM COATED ORAL at 17:21

## 2024-01-01 RX ADMIN — HYDROCORTISONE 1 APPLICATION(S): 10 CREAM TOPICAL at 18:31

## 2024-01-01 RX ADMIN — MIDODRINE HYDROCHLORIDE 5 MILLIGRAM(S): 5 TABLET ORAL at 17:00

## 2024-01-01 RX ADMIN — Medication 40 MILLIGRAM(S): at 05:32

## 2024-01-01 RX ADMIN — MIDODRINE HYDROCHLORIDE 5 MILLIGRAM(S): 5 TABLET ORAL at 18:32

## 2024-01-01 RX ADMIN — Medication 40 MILLIGRAM(S): at 17:31

## 2024-01-01 RX ADMIN — METOPROLOL SUCCINATE 25 MILLIGRAM(S): 50 TABLET, EXTENDED RELEASE ORAL at 11:13

## 2024-01-01 RX ADMIN — LACOSAMIDE 50 MILLIGRAM(S): 150 TABLET, FILM COATED ORAL at 17:41

## 2024-01-01 RX ADMIN — HYDROCORTISONE 1 APPLICATION(S): 10 CREAM TOPICAL at 05:04

## 2024-01-01 RX ADMIN — Medication 1 APPLICATION(S): at 05:20

## 2024-01-01 RX ADMIN — HEPARIN SODIUM 5000 UNIT(S): 1000 INJECTION INTRAVENOUS; SUBCUTANEOUS at 16:58

## 2024-01-01 RX ADMIN — LACTULOSE 20 GRAM(S): 10 SOLUTION ORAL at 13:31

## 2024-01-01 RX ADMIN — Medication 1 APPLICATION(S): at 06:14

## 2024-01-01 RX ADMIN — LACTULOSE 20 GRAM(S): 10 SOLUTION ORAL at 12:23

## 2024-01-01 RX ADMIN — CEFEPIME 100 MILLIGRAM(S): 2 INJECTION, POWDER, FOR SOLUTION INTRAVENOUS at 16:47

## 2024-01-01 RX ADMIN — HYDROCORTISONE 1 APPLICATION(S): 10 CREAM TOPICAL at 06:36

## 2024-01-01 RX ADMIN — MIDODRINE HYDROCHLORIDE 5 MILLIGRAM(S): 5 TABLET ORAL at 17:32

## 2024-01-01 RX ADMIN — LACOSAMIDE 50 MILLIGRAM(S): 150 TABLET, FILM COATED ORAL at 17:45

## 2024-01-01 RX ADMIN — LACOSAMIDE 50 MILLIGRAM(S): 150 TABLET, FILM COATED ORAL at 17:15

## 2024-01-01 RX ADMIN — LACTULOSE 20 GRAM(S): 10 SOLUTION ORAL at 17:00

## 2024-01-01 RX ADMIN — Medication 100 MILLIGRAM(S): at 01:42

## 2024-01-01 RX ADMIN — Medication 1 APPLICATION(S): at 11:45

## 2024-01-01 RX ADMIN — LACTULOSE 20 GRAM(S): 10 SOLUTION ORAL at 09:36

## 2024-01-01 RX ADMIN — Medication 1 APPLICATION(S): at 11:20

## 2024-01-01 RX ADMIN — EPOETIN ALFA 30000 UNIT(S): 3000 SOLUTION INTRAVENOUS; SUBCUTANEOUS at 12:49

## 2024-01-01 RX ADMIN — HYDROCORTISONE 1 APPLICATION(S): 10 CREAM TOPICAL at 05:56

## 2024-01-01 RX ADMIN — Medication 40 MILLIGRAM(S): at 11:23

## 2024-01-01 RX ADMIN — HEPARIN SODIUM 5000 UNIT(S): 1000 INJECTION INTRAVENOUS; SUBCUTANEOUS at 18:29

## 2024-01-01 RX ADMIN — LACOSAMIDE 100 MILLIGRAM(S): 150 TABLET, FILM COATED ORAL at 11:46

## 2024-01-01 RX ADMIN — MIDODRINE HYDROCHLORIDE 5 MILLIGRAM(S): 5 TABLET ORAL at 12:23

## 2024-01-01 RX ADMIN — Medication 40 MILLIGRAM(S): at 05:38

## 2024-01-01 RX ADMIN — IRON SUCROSE 200 MILLIGRAM(S): 20 INJECTION, SOLUTION INTRAVENOUS at 17:06

## 2024-01-01 RX ADMIN — HEPARIN SODIUM 5000 UNIT(S): 1000 INJECTION INTRAVENOUS; SUBCUTANEOUS at 06:35

## 2024-01-01 RX ADMIN — Medication 1 APPLICATION(S): at 05:32

## 2024-01-01 RX ADMIN — Medication 1000 MILLIGRAM(S): at 04:33

## 2024-01-01 RX ADMIN — EPOETIN ALFA 30000 UNIT(S): 3000 SOLUTION INTRAVENOUS; SUBCUTANEOUS at 14:30

## 2024-01-01 RX ADMIN — Medication 40 MILLIGRAM(S): at 17:50

## 2024-01-01 RX ADMIN — MIDODRINE HYDROCHLORIDE 5 MILLIGRAM(S): 5 TABLET ORAL at 05:32

## 2024-01-01 RX ADMIN — Medication 1 APPLICATION(S): at 17:32

## 2024-01-01 RX ADMIN — LACOSAMIDE 50 MILLIGRAM(S): 150 TABLET, FILM COATED ORAL at 17:30

## 2024-01-01 RX ADMIN — DAPTOMYCIN 140 MILLIGRAM(S): 500 INJECTION, POWDER, LYOPHILIZED, FOR SOLUTION INTRAVENOUS at 16:56

## 2024-01-01 RX ADMIN — HYDROCORTISONE 1 APPLICATION(S): 10 CREAM TOPICAL at 05:21

## 2024-01-01 RX ADMIN — HEPARIN SODIUM 5000 UNIT(S): 1000 INJECTION INTRAVENOUS; SUBCUTANEOUS at 17:52

## 2024-01-01 RX ADMIN — LACTULOSE 20 GRAM(S): 10 SOLUTION ORAL at 10:52

## 2024-01-01 RX ADMIN — DAPTOMYCIN 140 MILLIGRAM(S): 500 INJECTION, POWDER, LYOPHILIZED, FOR SOLUTION INTRAVENOUS at 15:22

## 2024-01-01 RX ADMIN — Medication 100 MILLIGRAM(S): at 06:29

## 2024-01-01 RX ADMIN — Medication 40 MILLIGRAM(S): at 12:01

## 2024-01-01 RX ADMIN — Medication 1 APPLICATION(S): at 06:42

## 2024-01-01 RX ADMIN — MIDODRINE HYDROCHLORIDE 10 MILLIGRAM(S): 5 TABLET ORAL at 05:06

## 2024-01-01 RX ADMIN — Medication 0.2 MILLIGRAM(S): at 22:16

## 2024-01-01 RX ADMIN — Medication 10 MILLIGRAM(S): at 11:31

## 2024-01-01 RX ADMIN — Medication 1 APPLICATION(S): at 14:07

## 2024-01-01 RX ADMIN — Medication 1 MILLIGRAM(S): at 21:10

## 2024-01-01 RX ADMIN — Medication 10 MILLIGRAM(S): at 11:52

## 2024-01-01 RX ADMIN — LACOSAMIDE 100 MILLIGRAM(S): 150 TABLET, FILM COATED ORAL at 01:03

## 2024-01-01 RX ADMIN — HYDROCORTISONE 1 APPLICATION(S): 10 CREAM TOPICAL at 17:34

## 2024-01-01 RX ADMIN — Medication 400 MILLIGRAM(S): at 14:17

## 2024-01-01 RX ADMIN — Medication 1 APPLICATION(S): at 13:08

## 2024-01-01 RX ADMIN — HEPARIN SODIUM 5000 UNIT(S): 1000 INJECTION INTRAVENOUS; SUBCUTANEOUS at 05:38

## 2024-01-01 RX ADMIN — HYDROCORTISONE 1 APPLICATION(S): 10 CREAM TOPICAL at 17:49

## 2024-01-01 RX ADMIN — LACTULOSE 20 GRAM(S): 10 SOLUTION ORAL at 17:30

## 2024-01-01 RX ADMIN — LACOSAMIDE 50 MILLIGRAM(S): 150 TABLET, FILM COATED ORAL at 17:29

## 2024-01-01 RX ADMIN — METOPROLOL SUCCINATE 25 MILLIGRAM(S): 50 TABLET, EXTENDED RELEASE ORAL at 17:30

## 2024-01-01 RX ADMIN — LACTULOSE 20 GRAM(S): 10 SOLUTION ORAL at 17:28

## 2024-01-01 RX ADMIN — Medication 1 APPLICATION(S): at 05:57

## 2024-01-01 RX ADMIN — SODIUM CHLORIDE 75 MILLILITER(S): 9 INJECTION, SOLUTION INTRAVENOUS at 15:58

## 2024-01-01 RX ADMIN — HEPARIN SODIUM 5000 UNIT(S): 1000 INJECTION INTRAVENOUS; SUBCUTANEOUS at 17:15

## 2024-01-01 RX ADMIN — Medication 1 GRAM(S): at 17:45

## 2024-01-01 RX ADMIN — HEPARIN SODIUM 5000 UNIT(S): 1000 INJECTION INTRAVENOUS; SUBCUTANEOUS at 05:06

## 2024-01-01 RX ADMIN — METOPROLOL SUCCINATE 25 MILLIGRAM(S): 50 TABLET, EXTENDED RELEASE ORAL at 23:07

## 2024-01-01 RX ADMIN — IRON SUCROSE 200 MILLIGRAM(S): 20 INJECTION, SOLUTION INTRAVENOUS at 13:28

## 2024-01-01 RX ADMIN — LACTULOSE 20 GRAM(S): 10 SOLUTION ORAL at 13:46

## 2024-01-01 RX ADMIN — Medication 0.2 MILLIGRAM(S): at 16:35

## 2024-01-01 RX ADMIN — LACOSAMIDE 100 MILLIGRAM(S): 150 TABLET, FILM COATED ORAL at 01:50

## 2024-01-01 RX ADMIN — Medication 1 APPLICATION(S): at 11:58

## 2024-01-01 RX ADMIN — MIDODRINE HYDROCHLORIDE 5 MILLIGRAM(S): 5 TABLET ORAL at 11:13

## 2024-01-01 RX ADMIN — Medication 1 APPLICATION(S): at 12:25

## 2024-01-01 RX ADMIN — Medication 1 GRAM(S): at 18:30

## 2024-01-01 RX ADMIN — LACTULOSE 20 GRAM(S): 10 SOLUTION ORAL at 05:06

## 2024-01-01 RX ADMIN — Medication 40 MILLIGRAM(S): at 11:54

## 2024-01-01 RX ADMIN — Medication 40 MILLIGRAM(S): at 11:27

## 2024-01-01 RX ADMIN — Medication 10 MILLIGRAM(S): at 11:55

## 2024-01-05 ENCOUNTER — OUTPATIENT (OUTPATIENT)
Dept: OUTPATIENT SERVICES | Facility: HOSPITAL | Age: 79
LOS: 1 days | End: 2024-01-05
Payer: MEDICARE

## 2024-01-05 ENCOUNTER — APPOINTMENT (OUTPATIENT)
Dept: INFUSION THERAPY | Facility: CLINIC | Age: 79
End: 2024-01-05

## 2024-01-05 DIAGNOSIS — Z98.890 OTHER SPECIFIED POSTPROCEDURAL STATES: Chronic | ICD-10-CM

## 2024-01-05 DIAGNOSIS — Z90.49 ACQUIRED ABSENCE OF OTHER SPECIFIED PARTS OF DIGESTIVE TRACT: Chronic | ICD-10-CM

## 2024-01-05 DIAGNOSIS — Z87.19 PERSONAL HISTORY OF OTHER DISEASES OF THE DIGESTIVE SYSTEM: Chronic | ICD-10-CM

## 2024-01-05 DIAGNOSIS — E87.5 HYPERKALEMIA: ICD-10-CM

## 2024-01-05 DIAGNOSIS — Z98.891 HISTORY OF UTERINE SCAR FROM PREVIOUS SURGERY: Chronic | ICD-10-CM

## 2024-01-05 DIAGNOSIS — Z95.828 PRESENCE OF OTHER VASCULAR IMPLANTS AND GRAFTS: Chronic | ICD-10-CM

## 2024-01-05 PROCEDURE — 96365 THER/PROPH/DIAG IV INF INIT: CPT

## 2024-01-05 PROCEDURE — 36430 TRANSFUSION BLD/BLD COMPNT: CPT

## 2024-01-05 PROCEDURE — 96372 THER/PROPH/DIAG INJ SC/IM: CPT | Mod: XU

## 2024-01-05 PROCEDURE — P9040: CPT

## 2024-01-05 RX ORDER — ERYTHROPOIETIN 10000 [IU]/ML
30000 INJECTION, SOLUTION INTRAVENOUS; SUBCUTANEOUS ONCE
Refills: 0 | Status: COMPLETED | OUTPATIENT
Start: 2024-01-05 | End: 2024-01-05

## 2024-01-05 RX ORDER — IRON SUCROSE 20 MG/ML
200 INJECTION, SOLUTION INTRAVENOUS ONCE
Refills: 0 | Status: COMPLETED | OUTPATIENT
Start: 2024-01-05 | End: 2024-01-05

## 2024-01-05 RX ADMIN — ERYTHROPOIETIN 30000 UNIT(S): 10000 INJECTION, SOLUTION INTRAVENOUS; SUBCUTANEOUS at 13:44

## 2024-01-05 RX ADMIN — IRON SUCROSE 220 MILLIGRAM(S): 20 INJECTION, SOLUTION INTRAVENOUS at 13:44

## 2024-01-06 DIAGNOSIS — E87.5 HYPERKALEMIA: ICD-10-CM

## 2024-01-12 ENCOUNTER — OUTPATIENT (OUTPATIENT)
Dept: OUTPATIENT SERVICES | Facility: HOSPITAL | Age: 79
LOS: 1 days | End: 2024-01-12
Payer: MEDICARE

## 2024-01-12 ENCOUNTER — APPOINTMENT (OUTPATIENT)
Dept: INFUSION THERAPY | Facility: CLINIC | Age: 79
End: 2024-01-12

## 2024-01-12 VITALS — TEMPERATURE: 97 F | SYSTOLIC BLOOD PRESSURE: 129 MMHG | HEART RATE: 58 BPM | DIASTOLIC BLOOD PRESSURE: 55 MMHG

## 2024-01-12 DIAGNOSIS — Z98.891 HISTORY OF UTERINE SCAR FROM PREVIOUS SURGERY: Chronic | ICD-10-CM

## 2024-01-12 DIAGNOSIS — Z90.49 ACQUIRED ABSENCE OF OTHER SPECIFIED PARTS OF DIGESTIVE TRACT: Chronic | ICD-10-CM

## 2024-01-12 DIAGNOSIS — Z98.890 OTHER SPECIFIED POSTPROCEDURAL STATES: Chronic | ICD-10-CM

## 2024-01-12 DIAGNOSIS — E87.5 HYPERKALEMIA: ICD-10-CM

## 2024-01-12 DIAGNOSIS — Z87.19 PERSONAL HISTORY OF OTHER DISEASES OF THE DIGESTIVE SYSTEM: Chronic | ICD-10-CM

## 2024-01-12 DIAGNOSIS — Z95.828 PRESENCE OF OTHER VASCULAR IMPLANTS AND GRAFTS: Chronic | ICD-10-CM

## 2024-01-12 PROCEDURE — 96372 THER/PROPH/DIAG INJ SC/IM: CPT

## 2024-01-12 PROCEDURE — 96365 THER/PROPH/DIAG IV INF INIT: CPT

## 2024-01-12 PROCEDURE — 96367 TX/PROPH/DG ADDL SEQ IV INF: CPT

## 2024-01-12 RX ORDER — ERYTHROPOIETIN 10000 [IU]/ML
30000 INJECTION, SOLUTION INTRAVENOUS; SUBCUTANEOUS ONCE
Refills: 0 | Status: COMPLETED | OUTPATIENT
Start: 2024-01-12 | End: 2024-01-12

## 2024-01-12 RX ORDER — IRON SUCROSE 20 MG/ML
200 INJECTION, SOLUTION INTRAVENOUS ONCE
Refills: 0 | Status: COMPLETED | OUTPATIENT
Start: 2024-01-12 | End: 2024-01-12

## 2024-01-12 RX ORDER — ONDANSETRON 8 MG/1
8 TABLET, FILM COATED ORAL ONCE
Refills: 0 | Status: COMPLETED | OUTPATIENT
Start: 2024-01-12 | End: 2024-01-12

## 2024-01-12 RX ADMIN — ERYTHROPOIETIN 30000 UNIT(S): 10000 INJECTION, SOLUTION INTRAVENOUS; SUBCUTANEOUS at 13:14

## 2024-01-12 RX ADMIN — ONDANSETRON 108 MILLIGRAM(S): 8 TABLET, FILM COATED ORAL at 13:15

## 2024-01-12 RX ADMIN — ONDANSETRON 8 MILLIGRAM(S): 8 TABLET, FILM COATED ORAL at 13:39

## 2024-01-12 RX ADMIN — IRON SUCROSE 220 MILLIGRAM(S): 20 INJECTION, SOLUTION INTRAVENOUS at 13:39

## 2024-01-12 RX ADMIN — IRON SUCROSE 200 MILLIGRAM(S): 20 INJECTION, SOLUTION INTRAVENOUS at 14:09

## 2024-01-17 NOTE — ED ADULT TRIAGE NOTE - AS HEIGHT TYPE
Patient: Sharmin New   : 1963 MRN: [id number]    SUBJECTIVE:  Chief Complaint   Patient presents with    Physical    Hypertension    Syncope    Depression       A 60 year old female presents for an annual physical examination.    HISTORY OF PRESENT ILLNESS:  Historian: Self.    Annual physical exam:  Diet: States that she feels hungry more at night and she is eating late. She would like to work on her diet and  physical activity.     Primary hypertension: Blood pressure as measured by MA today in the office is 120/80 mmHg. Is on Amlodipine (NORVASC) 5 mg and Losartan (COZAAR) 25 mg.     Hyperglycemia: Recent labs reveal elevated blood glucose levels, 151 mg/dL and A1c came back at 6.2%.     Mixed hyperlipidemia: Recent labs reveal elevated cholesterol levels, they have been high in the past as well.     Stage 3 chronic kidney disease, unspecified whether stage 3a or 3b CKD (CMD): Recent labs reveal stable kidney functions.     Vitamin D deficiency: Recent labs reveal normal vitamin D levels. Is on vitamin D, Ergocalciferol, 1.25 mg (50,000 units) capsule    Elevated alkaline phosphatase level: Recent labs reveal alkaline phosphatase slightly elevated but is stable and her other liver enzymes are normal.    Bipolar affective disorder, current episode mixed, current episode severity unspecified (CMD): Is on Lamotrigine (Lamictal) 100 mg and Olanzapine (Zyprexa) 15 mg. She is in regular follow up with behavioral health.     Depression, unspecified depression type: Is on Escitalopram (LEXAPRO) 20 mg and Bupropion XL (WELLBUTRIN XL) 300 mg.    Need for vaccination: Due for Influenza vaccination.     AKIN (obstructive sleep apnea): Patient underwent sleep study and was diagnosed with sleep apnea in the past; however, she had never been on any treatment. Denies snoring.     Class 2 obesity due to excess calories with body mass index (BMI) of 35.0 to 35.9 in adult, unspecified whether serious comorbidity present:  She has put on 20 lbs since her last visit to me. Patient would like to work on her weight and diet.    PAST MEDICAL HISTORY:  Past Medical History:   Diagnosis Date    Acne     Anxiety     Bipolar disorder (CMD)     CAD (coronary artery disease)     Carpal tunnel syndrome     Chronic fatigue     Depression     Diabetic polyneuropathy (CMD)     Diverticulosis     DJD (degenerative joint disease)     Esophageal reflux     Hirsutism - hypertrichosis 8/26/2013    History of female hirsutism     History of left foot drop     History of seasonal allergies     History of seizure     2014-cause unclear    History of vitamin D deficiency     HTN (hypertension)     Hyperlipidemia     Hypertensive retinopathy     Hypertensive retinopathy     Lateral popliteal nerve lesion     AKIN (obstructive sleep apnea)     no CPAP    Osteomyelitis (CMD)     Pancreatitis     Polycystic ovarian syndrome 8/26/2013    PONV (postoperative nausea and vomiting)     Staph aureus infection 2013    infected left total hiparthroplasty-MSSA    Substance abuse (CMD)     Transfusion history 04/07/2014    Tremor     Type II or unspecified type diabetes mellitus without mention of complication, not stated as uncontrolled      MEDICATIONS:  Current Outpatient Medications   Medication Sig    rosuvastatin (CRESTOR) 5 MG tablet Take 1 tablet by mouth daily.    Vitamin D, Ergocalciferol, 1.25 mg (50,000 units) capsule TAKE 1 CAPSULE BY MOUTH 1 DAY A WEEK.    losartan (COZAAR) 25 MG tablet TAKE 1 TABLET BY MOUTH EVERY DAY    escitalopram (LEXAPRO) 20 MG tablet TAKE 1 TABLET BY MOUTH EVERY DAY    lamoTRIgine (LaMICtal) 100 MG tablet TAKE 1 TABLET BY MOUTH EVERY DAY    OLANZapine (ZyPREXA) 15 MG tablet TAKE 1 TABLET BY MOUTH NIGHTLY    amLODIPine (NORVASC) 5 MG tablet TAKE 1 TABLET BY MOUTH EVERY DAY    buPROPion XL (WELLBUTRIN XL) 300 MG 24 hr tablet TAKE 1 TABLET BY MOUTH EVERY DAY    omeprazole (PrilOSEC) 20 MG capsule TAKE 1 CAPSULE BY MOUTH EVERY DAY     ferrous sulfate 325 (65 FE) MG tablet Take 1 tablet by mouth daily (with breakfast). Due for visit.    Melatonin 5 MG Tab Take 15 mg by mouth nightly as needed (insomnia).    Multiple Vitamins-Minerals (MULTIVITAMIN & MINERAL PO) Take 1 tablet by mouth daily.     No current facility-administered medications for this visit.     ALLERGIES:  ALLERGIES:   Allergen Reactions    Clindamycin Phosphate PRURITUS    Dust Other (See Comments)     ALLERGIC RHINITIS    Niacin PRURITUS     PAST SURGICAL HISTORY:  Past Surgical History:   Procedure Laterality Date    Exc excess skin abd inframbical panniculec  2013    Hernia repair  2012    DIAPHRAGMATIC    Insert picc line  2014    subsequent removal    Revise total hip replacement Left 4/3/2014    implant of antiobiotic spacer    Revision total hip arthroplasty Left 6/10/2014    Sleeve gastroplasty  2012    Total hip replacement Bilateral 2013     FAMILY HISTORY:  Family History   Problem Relation Age of Onset    Cancer Son         leukemia, bone marrow transplant, doing well    Heart Father     Diabetes Father     Heart disease Father     Diabetes Maternal Uncle     Heart disease Maternal Uncle     Diabetes Maternal Uncle     Heart Maternal Uncle     Cancer Maternal Aunt         breast    Kidney disease Maternal Aunt         kidney removed     SOCIAL HISTORY:  Social History     Tobacco Use   Smoking Status Former    Current packs/day: 0.00    Types: Cigarettes    Quit date: 1994    Years since quittin.0   Smokeless Tobacco Never   Tobacco Comments    smoked as a teenager     Social History     Substance and Sexual Activity   Alcohol Use Yes    Alcohol/week: 3.0 standard drinks of alcohol    Types: 3 Glasses of wine per week     REVIEW OF SYSTEMS:  Constitutional: As Per HPI.    Cardiovascular: As Per HPI.  Endocrine: As Per HPI.  Psychiatric/behavioral: As per HPI.    OBJECTIVE:  Vitals:    24 0928   BP: 120/80   Pulse: (!) 55    Resp: 16   Temp: 97.6 °F (36.4 °C)   SpO2: 95%   Weight: 92.5 kg (204 lb)   Height: 5' 2.5\"   LMP: 10/09/2015     Body mass index is 36.72 kg/m².    PHYSICAL EXAM:  Constitutional: Alert, in no acute distress and current vital signs reviewed.   Head and Face: Atraumatic and normocephalic.   Eyes: No discharge, no eyelid swelling and the sclerae were normal.   ENT: Oropharynx normal. Normal appearing outer ear. Tympanic membranes are bilaterally clear, normal appearing nose and normal lips.   Neck: Normal appearing neck and supple neck.   Pulmonary: Breath sounds clear to auscultation bilaterally, but no respiratory distress and normal respiratory rate and effort.   Cardiovascular: Normal rate, regular rhythm, normal S1, normal S2 and edema was not present in the lower extremities.   Abdomen: Soft and nontender.   Psychiatric: Alert and awake, interactive and mood/affect were appropriate.   Skin, Hair, Nails: Normal skin color and pigmentation.    ASSESSMENT AND PLAN:  This is a 60 year old female who presents with :  1. Annual physical exam  Reviewed and discussed previous lab reports.  Recommended following a healthy diet, monitor meal portion size, and prefer a low carbohydrate diet.   Encouraged to exercise regularly, and to lose weight.   Advised to take vitamin D3 supplements regularly.    2. Primary hypertension  Currently, blood pressure is well controlled.  Continue Amlodipine (NORVASC) 5 mg and Losartan (COZAAR) 25 mg.     3. Hyperglycemia  Prediabetic.  Reviewed and discussed previous labs.  Advised about diet physical activity and weight control to prevent progression to type 2 diabetes.    4. Mixed hyperlipidemia  Cholesterol levels have been consistently elevated.  Reviewed and discussed previous labs.  Prescribed Rosuvastatin (CRESTOR) 5 mg. Take 1 tablet by mouth daily.    5. Stage 3 chronic kidney disease, unspecified whether stage 3a or 3b CKD (CMD)  Renal functions are stable.  Reviewed and  discussed previous labs.    6. Vitamin D deficiency  Vitamin D levels came back normal.   Reviewed and discussed previous labs.  Continue vitamin D, Ergocalciferol, 1.25 mg (50,000 units) capsule    7. Elevated alkaline phosphatase level  Alkaline phosphatase slightly elevated but is stable.  Reviewed and discussed previous labs.    8. Bipolar affective disorder, current episode mixed, current episode severity unspecified (CMD)  Continue Lamotrigine (Lamictal) 100 mg and Olanzapine (Zyprexa) 15 mg.  Continue follow up with behavioral health.     9. Depression, unspecified depression type  Well controlled with current medications.  Continue Escitalopram (LEXAPRO) 20 mg and Bupropion XL (WELLBUTRIN XL) 300 mg.    10. Need for vaccination  Administered Influenza Quadrivalent Split Pres Free 0.5 mL Pre-filled Vacc (FluLaval, Fluzone, Fluarix; ages 6+ months - [id number]    11. AKIN (obstructive sleep apnea):  Patient is not willing to investigate obstructive sleep apnea at this time.   She was diagnosed with sleep apnea in the past; however, she had never been on any treatment  Discussed risks of untreated obstructive sleep apnea.     12. Class 2 obesity due to excess calories with body mass index (BMI) of 35.0 to 35.9 in adult:  Discussed the importance of weight loss and complication risk associated with obesity.  We discussed diet physical activity and other lifestyle changes that will help lose weight.    Follow up as needed.  Orders Placed This Encounter    MAMMO SCREENING BILATERAL    Influenza Quadrivalent Split Pres Free 0.5 mL Pre-filled Vacc (FluLaval, Fluzone, Fluarix; ages 6+ months - [id number]    Pap Test    rosuvastatin (CRESTOR) 5 MG tablet     Refer to orders.  Medical compliance with plan discussed and risks of non-compliance reviewed.  Patient education completed on disease process, etiology & prognosis.  Proper usage and side effects of medications reviewed & discussed.  Patient understands and agrees with  the plan.  Return to clinic as clinically indicated as discussed with patient who verbalized understanding of the plan and is in agreement with the plan.    Return in about 1 year (around 1/16/2025), or PLEASE DO FASTING LABS ONE WEEK PRIOR, for CPE.    I,  Tawanda Tavarez, have created a visit summary document based on the audio recording between Dr. Luis Matson MD and this patient for the physician to review, edit as needed, and authenticate.  Creation Date: 1/16/2024       I have reviewed and edited the visit summary above and attest that it is accurate.  Luis Matson MD     stated

## 2024-01-19 ENCOUNTER — OUTPATIENT (OUTPATIENT)
Dept: OUTPATIENT SERVICES | Facility: HOSPITAL | Age: 79
LOS: 1 days | End: 2024-01-19
Payer: MEDICARE

## 2024-01-19 ENCOUNTER — APPOINTMENT (OUTPATIENT)
Dept: INFUSION THERAPY | Facility: CLINIC | Age: 79
End: 2024-01-19

## 2024-01-19 VITALS — SYSTOLIC BLOOD PRESSURE: 119 MMHG | TEMPERATURE: 98 F | DIASTOLIC BLOOD PRESSURE: 56 MMHG | HEART RATE: 65 BPM

## 2024-01-19 DIAGNOSIS — Z98.890 OTHER SPECIFIED POSTPROCEDURAL STATES: Chronic | ICD-10-CM

## 2024-01-19 DIAGNOSIS — E87.5 HYPERKALEMIA: ICD-10-CM

## 2024-01-19 DIAGNOSIS — Z95.828 PRESENCE OF OTHER VASCULAR IMPLANTS AND GRAFTS: Chronic | ICD-10-CM

## 2024-01-19 DIAGNOSIS — Z98.891 HISTORY OF UTERINE SCAR FROM PREVIOUS SURGERY: Chronic | ICD-10-CM

## 2024-01-19 DIAGNOSIS — Z87.19 PERSONAL HISTORY OF OTHER DISEASES OF THE DIGESTIVE SYSTEM: Chronic | ICD-10-CM

## 2024-01-19 DIAGNOSIS — Z90.49 ACQUIRED ABSENCE OF OTHER SPECIFIED PARTS OF DIGESTIVE TRACT: Chronic | ICD-10-CM

## 2024-01-19 PROCEDURE — 96372 THER/PROPH/DIAG INJ SC/IM: CPT

## 2024-01-19 PROCEDURE — 36430 TRANSFUSION BLD/BLD COMPNT: CPT

## 2024-01-19 PROCEDURE — P9040: CPT

## 2024-01-19 PROCEDURE — 96365 THER/PROPH/DIAG IV INF INIT: CPT

## 2024-01-19 RX ORDER — ERYTHROPOIETIN 10000 [IU]/ML
30000 INJECTION, SOLUTION INTRAVENOUS; SUBCUTANEOUS ONCE
Refills: 0 | Status: COMPLETED | OUTPATIENT
Start: 2024-01-19 | End: 2024-01-19

## 2024-01-19 RX ORDER — IRON SUCROSE 20 MG/ML
200 INJECTION, SOLUTION INTRAVENOUS ONCE
Refills: 0 | Status: COMPLETED | OUTPATIENT
Start: 2024-01-19 | End: 2024-01-19

## 2024-01-19 RX ADMIN — IRON SUCROSE 220 MILLIGRAM(S): 20 INJECTION, SOLUTION INTRAVENOUS at 12:39

## 2024-01-19 RX ADMIN — IRON SUCROSE 200 MILLIGRAM(S): 20 INJECTION, SOLUTION INTRAVENOUS at 13:10

## 2024-01-19 RX ADMIN — ERYTHROPOIETIN 30000 UNIT(S): 10000 INJECTION, SOLUTION INTRAVENOUS; SUBCUTANEOUS at 12:39

## 2024-01-26 ENCOUNTER — OUTPATIENT (OUTPATIENT)
Dept: OUTPATIENT SERVICES | Facility: HOSPITAL | Age: 79
LOS: 1 days | End: 2024-01-26
Payer: MEDICARE

## 2024-01-26 ENCOUNTER — APPOINTMENT (OUTPATIENT)
Dept: INFUSION THERAPY | Facility: CLINIC | Age: 79
End: 2024-01-26

## 2024-01-26 VITALS — DIASTOLIC BLOOD PRESSURE: 59 MMHG | SYSTOLIC BLOOD PRESSURE: 123 MMHG | HEART RATE: 71 BPM | TEMPERATURE: 98 F

## 2024-01-26 DIAGNOSIS — E87.5 HYPERKALEMIA: ICD-10-CM

## 2024-01-26 DIAGNOSIS — Z87.19 PERSONAL HISTORY OF OTHER DISEASES OF THE DIGESTIVE SYSTEM: Chronic | ICD-10-CM

## 2024-01-26 DIAGNOSIS — Z95.828 PRESENCE OF OTHER VASCULAR IMPLANTS AND GRAFTS: Chronic | ICD-10-CM

## 2024-01-26 DIAGNOSIS — Z90.49 ACQUIRED ABSENCE OF OTHER SPECIFIED PARTS OF DIGESTIVE TRACT: Chronic | ICD-10-CM

## 2024-01-26 DIAGNOSIS — Z98.890 OTHER SPECIFIED POSTPROCEDURAL STATES: Chronic | ICD-10-CM

## 2024-01-26 PROCEDURE — 96372 THER/PROPH/DIAG INJ SC/IM: CPT

## 2024-01-26 PROCEDURE — 96365 THER/PROPH/DIAG IV INF INIT: CPT

## 2024-01-26 RX ORDER — ERYTHROPOIETIN 10000 [IU]/ML
30000 INJECTION, SOLUTION INTRAVENOUS; SUBCUTANEOUS ONCE
Refills: 0 | Status: COMPLETED | OUTPATIENT
Start: 2024-01-26 | End: 2024-01-26

## 2024-01-26 RX ORDER — IRON SUCROSE 20 MG/ML
200 INJECTION, SOLUTION INTRAVENOUS ONCE
Refills: 0 | Status: COMPLETED | OUTPATIENT
Start: 2024-01-26 | End: 2024-01-26

## 2024-01-26 RX ADMIN — IRON SUCROSE 200 MILLIGRAM(S): 20 INJECTION, SOLUTION INTRAVENOUS at 12:45

## 2024-01-26 RX ADMIN — ERYTHROPOIETIN 30000 UNIT(S): 10000 INJECTION, SOLUTION INTRAVENOUS; SUBCUTANEOUS at 12:16

## 2024-01-26 RX ADMIN — IRON SUCROSE 220 MILLIGRAM(S): 20 INJECTION, SOLUTION INTRAVENOUS at 12:14

## 2024-02-02 ENCOUNTER — OUTPATIENT (OUTPATIENT)
Dept: OUTPATIENT SERVICES | Facility: HOSPITAL | Age: 79
LOS: 1 days | End: 2024-02-02
Payer: MEDICARE

## 2024-02-02 ENCOUNTER — APPOINTMENT (OUTPATIENT)
Dept: INFUSION THERAPY | Facility: CLINIC | Age: 79
End: 2024-02-02

## 2024-02-02 DIAGNOSIS — Z90.49 ACQUIRED ABSENCE OF OTHER SPECIFIED PARTS OF DIGESTIVE TRACT: Chronic | ICD-10-CM

## 2024-02-02 DIAGNOSIS — Z98.890 OTHER SPECIFIED POSTPROCEDURAL STATES: Chronic | ICD-10-CM

## 2024-02-02 DIAGNOSIS — Z98.891 HISTORY OF UTERINE SCAR FROM PREVIOUS SURGERY: Chronic | ICD-10-CM

## 2024-02-02 DIAGNOSIS — Z95.828 PRESENCE OF OTHER VASCULAR IMPLANTS AND GRAFTS: Chronic | ICD-10-CM

## 2024-02-02 DIAGNOSIS — D64.9 ANEMIA, UNSPECIFIED: ICD-10-CM

## 2024-02-02 DIAGNOSIS — Z87.19 PERSONAL HISTORY OF OTHER DISEASES OF THE DIGESTIVE SYSTEM: Chronic | ICD-10-CM

## 2024-02-02 PROCEDURE — 36415 COLL VENOUS BLD VENIPUNCTURE: CPT

## 2024-02-02 PROCEDURE — 96372 THER/PROPH/DIAG INJ SC/IM: CPT

## 2024-02-02 PROCEDURE — 96365 THER/PROPH/DIAG IV INF INIT: CPT

## 2024-02-02 PROCEDURE — 80048 BASIC METABOLIC PNL TOTAL CA: CPT

## 2024-02-02 RX ORDER — ERYTHROPOIETIN 10000 [IU]/ML
30000 INJECTION, SOLUTION INTRAVENOUS; SUBCUTANEOUS ONCE
Refills: 0 | Status: COMPLETED | OUTPATIENT
Start: 2024-02-02 | End: 2024-02-02

## 2024-02-02 RX ORDER — IRON SUCROSE 20 MG/ML
200 INJECTION, SOLUTION INTRAVENOUS ONCE
Refills: 0 | Status: COMPLETED | OUTPATIENT
Start: 2024-02-02 | End: 2024-02-02

## 2024-02-02 RX ADMIN — ERYTHROPOIETIN 30000 UNIT(S): 10000 INJECTION, SOLUTION INTRAVENOUS; SUBCUTANEOUS at 13:51

## 2024-02-02 RX ADMIN — IRON SUCROSE 200 MILLIGRAM(S): 20 INJECTION, SOLUTION INTRAVENOUS at 14:30

## 2024-02-02 RX ADMIN — IRON SUCROSE 220 MILLIGRAM(S): 20 INJECTION, SOLUTION INTRAVENOUS at 13:50

## 2024-02-03 DIAGNOSIS — D64.9 ANEMIA, UNSPECIFIED: ICD-10-CM

## 2024-02-03 LAB
ANION GAP SERPL CALC-SCNC: 6 MMOL/L
BUN SERPL-MCNC: 27 MG/DL
CALCIUM SERPL-MCNC: 7.1 MG/DL
CHLORIDE SERPL-SCNC: 119 MMOL/L
CO2 SERPL-SCNC: 19 MMOL/L
CREAT SERPL-MCNC: 1.4 MG/DL
EGFR: 38 ML/MIN/1.73M2
GLUCOSE SERPL-MCNC: 85 MG/DL
POTASSIUM SERPL-SCNC: 5 MMOL/L
SODIUM SERPL-SCNC: 144 MMOL/L

## 2024-02-05 ENCOUNTER — RX RENEWAL (OUTPATIENT)
Age: 79
End: 2024-02-05

## 2024-02-09 ENCOUNTER — APPOINTMENT (OUTPATIENT)
Dept: INFUSION THERAPY | Facility: CLINIC | Age: 79
End: 2024-02-09

## 2024-02-13 ENCOUNTER — APPOINTMENT (OUTPATIENT)
Dept: INFUSION THERAPY | Facility: CLINIC | Age: 79
End: 2024-02-13

## 2024-02-16 ENCOUNTER — APPOINTMENT (OUTPATIENT)
Dept: INFUSION THERAPY | Facility: CLINIC | Age: 79
End: 2024-02-16

## 2024-02-16 ENCOUNTER — OUTPATIENT (OUTPATIENT)
Dept: OUTPATIENT SERVICES | Facility: HOSPITAL | Age: 79
LOS: 1 days | End: 2024-02-16
Payer: MEDICARE

## 2024-02-16 DIAGNOSIS — D64.9 ANEMIA, UNSPECIFIED: ICD-10-CM

## 2024-02-16 DIAGNOSIS — Z95.828 PRESENCE OF OTHER VASCULAR IMPLANTS AND GRAFTS: Chronic | ICD-10-CM

## 2024-02-16 DIAGNOSIS — Z90.49 ACQUIRED ABSENCE OF OTHER SPECIFIED PARTS OF DIGESTIVE TRACT: Chronic | ICD-10-CM

## 2024-02-16 DIAGNOSIS — Z98.891 HISTORY OF UTERINE SCAR FROM PREVIOUS SURGERY: Chronic | ICD-10-CM

## 2024-02-16 DIAGNOSIS — Z98.890 OTHER SPECIFIED POSTPROCEDURAL STATES: Chronic | ICD-10-CM

## 2024-02-16 DIAGNOSIS — Z87.19 PERSONAL HISTORY OF OTHER DISEASES OF THE DIGESTIVE SYSTEM: Chronic | ICD-10-CM

## 2024-02-16 PROCEDURE — 96365 THER/PROPH/DIAG IV INF INIT: CPT

## 2024-02-16 PROCEDURE — 96372 THER/PROPH/DIAG INJ SC/IM: CPT

## 2024-02-16 RX ORDER — ERYTHROPOIETIN 10000 [IU]/ML
30000 INJECTION, SOLUTION INTRAVENOUS; SUBCUTANEOUS ONCE
Refills: 0 | Status: COMPLETED | OUTPATIENT
Start: 2024-02-16 | End: 2024-02-16

## 2024-02-16 RX ORDER — IRON SUCROSE 20 MG/ML
200 INJECTION, SOLUTION INTRAVENOUS ONCE
Refills: 0 | Status: COMPLETED | OUTPATIENT
Start: 2024-02-16 | End: 2024-02-16

## 2024-02-16 RX ADMIN — ERYTHROPOIETIN 30000 UNIT(S): 10000 INJECTION, SOLUTION INTRAVENOUS; SUBCUTANEOUS at 13:44

## 2024-02-16 RX ADMIN — IRON SUCROSE 200 MILLIGRAM(S): 20 INJECTION, SOLUTION INTRAVENOUS at 14:15

## 2024-02-16 RX ADMIN — IRON SUCROSE 220 MILLIGRAM(S): 20 INJECTION, SOLUTION INTRAVENOUS at 13:44

## 2024-02-16 NOTE — PROGRESS NOTE ADULT - SUBJECTIVE AND OBJECTIVE BOX
Progress Note:  Provider Speciality                            Hospitalist      LATRICIA ARREAGA MRN-[id number] 76y Female     CHIEF PRESENTING COMPLAINT:  Patient is a 76y old  Female who presents with a chief complaint of Low Hb (11 Mar 2022 14:48)        SUBJECTIVE:  Patient was seen and examined at bedside. Reports improvement in  presenting complaint.   No significant overnight events reported.     HISTORY OF PRESENTING ILLNESS:  HPI:  Location: [facility] ER Hold 020 A ([facility] Hold)  Patient Name: LATRICIA ARREAGA  Age: 76y  Gender: Female      History of Present Illness    Ms. Arreaga is a 76 year old (ex smoker) female patient known to have:  - Severe AS. Last TTE 02/17/22 EF 74%, severe AS with A 0.8cm2 and mean P 48.9,  mild MR  - Anemia Likely Secondary to obscure GI bleeding (from Duodenum and Gastric AVM per recent capsule endoscopy 02/2022 after presenting with melena), Fe Deficiency (TFN S% 5 in 2020 and ferritin 28 in 2022), and CKD per Dr Silver. Received Transfusions q1-2 weeks and Retacrit 49060 units weekly  - Moderate Diverticulosis and Grade II Internal Hemorrhoids on colonoscopy 10/22/2019  - Erosive Gastritis on EGD 01/09/2020  - Hypertension  - CKD. Baseline Cr 1.7-2.2 2022  - Sciatica      She presented to the ED on 03/04 for evaluation of low Hb 4.5 on outpatient labs.  History goes back to yesterday when the patient had labs at [facility].  Hb came back as 4.5 today so Dr Silver asked her to come to ED.  Patient reports episodes of substernal chest discomfort and shortness of breath on minimal exertion associated with worsening leg swelling, orthopneas, and PNDs.  She denies light headedness, palpitations, syncope, falls, or diaphoresis.  She also denies any hematemesis, melena, hematochezia, or hemoptysis. Passing dark brown bowel movements almost on a daily basis (last time admitted with back stools was in 02/2022)      On review of systems, patient denies any recent fever, chills, night sweats, URTI symptoms (cough, rhinorrhea, sore throat), urinary symptoms (urinary frequency, urgency, intermittence, dysuria, foul smelling urine, cloudy urine), abdominal pain, headache, nausea, or vomiting.   No sick contacts.   No recent travel or exposure to recent travelers.      Upon presentation to the ED, the patient was hemodynamically stable:  Vital Signs in ED   - /44 mmHg  - HR 74 bpm  - RR 20 bpm  - T 97 degrees  - SaO2 99% on RA    Investigations   Laboratory Workup  - CBC:                        4.5    4.54  )-----------( 134      ( 04 Mar 2022 12:30 )             15.5     - Chemistry:  03-04    139  |  106  |  60<H>  ----------------------------<  94  5.3<H>   |  25  |  2.7<H>    Ca    8.3<L>      04 Mar 2022 12:30    TPro  5.7<L>  /  Alb  3.3<L>  /  TBili  0.6  /  DBili  x   /  AST  13  /  ALT  6   /  AlkPhos  66  03-04    - Coagulation Studies:  PT/INR - ( 04 Mar 2022 12:30 )   PT: 11.70 sec;   INR: 1.02 ratio    PTT - ( 04 Mar 2022 12:30 )  PTT:32.0 sec      Microbiological Workup      Radiological Workup  * CXR with Bilateral perihilar and lower lobe opacities and effusions, left greater than right.      - GI team evaluated patient in the ED  - OLEKSANDR with brown stool, no bright red blood  - 3 units of packed RBCs were ordered for patient  - Stat IV lasix 40mg x1 dose was administered (not more since BP on low side and patient has severe AS)  - Patient will be admitted for further investigations, management, and monitoring           (04 Mar 2022 15:57)        REVIEW OF SYSTEMS:  Patient denies any headache, any vision complaints, runny nose, fever, chills. Denies chest pain, shortness of breath, palpitation. Denies nausea, vomiting, abdominal pain or diarrhoea, Denies dysuria. Denies  localized weakness in any part of the body or numbness.   At least 10 systems were reviewed in ROS. All systems reviewed  are within normal limits except for the complaints as described in Subjective.    PAST MEDICAL & SURGICAL HISTORY:  PAST MEDICAL & SURGICAL HISTORY:  HTN (hypertension)    Heart murmur    Eczema    Anemia    Aortic stenosis    H/O appendicitis    H/O cholecystitis            VITAL SIGNS:  Vital Signs Last 24 Hrs  T(C): 36.2 (15 Mar 2022 07:27), Max: 36.2 (15 Mar 2022 07:27)  T(F): 97.1 (15 Mar 2022 07:27), Max: 97.1 (15 Mar 2022 07:27)  HR: 52 (15 Mar 2022 07:27) (52 - 66)  BP: 118/56 (15 Mar 2022 07:27) (118/56 - 150/65)  BP(mean): --  RR: 18 (15 Mar 2022 07:27) (18 - 18)  SpO2: 95% (15 Mar 2022 07:27) (95% - 95%)    PHYSICAL EXAMINATION:  Not in acute distress, obese  General: No icterus  HEENT:   no JVD.  Heart: S1+S2 audible  Lungs: bilateral  moderate air entry, no wheezing, no crepitations.  Abdomen: Soft, non-tender, non-distended , no  rigidity or guarding.  CNS: Awake alert, CN  grossly intact.  Extremities:   edema    LE improved        CONSULTS:  Consultant(s) Notes Reviewed by me.   Care Discussed with Consultants/Other Providers where required.        MEDICATIONS:  MEDICATIONS  (STANDING):  chlorhexidine 4% Liquid 1 Application(s) Topical <User Schedule>  metoprolol tartrate 25 milliGRAM(s) Oral two times a day  pantoprazole    Tablet 40 milliGRAM(s) Oral two times a day  predniSONE   Tablet 50 milliGRAM(s) Oral daily    MEDICATIONS  (PRN):  acetaminophen     Tablet .. 650 milliGRAM(s) Oral every 6 hours PRN Mild Pain (1 - 3)  aluminum hydroxide/magnesium hydroxide/simethicone Suspension 30 milliLiter(s) Oral every 4 hours PRN Dyspepsia  diphenhydrAMINE 25 milliGRAM(s) Oral every 4 hours PRN Rash and/or Itching  ondansetron Injectable 4 milliGRAM(s) IV Push every 8 hours PRN Nausea and/or Vomiting            ASSESSMENT:      77 y/o woman with PMH of severe AS, HTN, CKD 4, sciatica and anemia recently found to have evidence of bleeding from gastric AVM and duodenum on capsule endoscopy and currently on q1-2 weekly transfusions and retacrit weekly now presented to the ED on 03/04 for evaluation of low hemoglobin on outpatient labs.       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 H/H currently stable  GI-As per family wishes holding off on scope unless clinical deterioration  Possible DARRIN- Venofer x 3 days, Received retacrit [id number] U x 1 on 3/7  Severe AS-TAVR deferred for now once active GI bleed, anemia, and EVAN have resolve   EVAN over CKD 4 -possible CRS vs AIN. Started on po steroids. Get UA and urine pro/cr, UA for eosinophils   Acute HFpEF / severe AS. Possibly euvolemic now. Switched to Torsemide 20 mg . Lasix discontinued  Continue current medical management for active chronic comorbid conditions.  Protonic switched to PO  DVT Prophylaxis as appropriate  Supportive care including activity, diet, goals of care Attending Attestation:  Patient was seen & examined independently. At least 10 systems were reviewed in ROS. All systems reviewed  are within normal limits. Latest vital signs and labs were reviewed today. Case was discussed with house staff in morning rounds for assessment and plan.  Patient is medically stable for discharge . About 32 mins spent on discharge disposition.ussed in AM rounds.  Discussed with  / in AM rounds  Handoff: Disposition: . Anticipated for  discharge to [facility] today         (4) no impairment

## 2024-02-23 ENCOUNTER — APPOINTMENT (OUTPATIENT)
Dept: INFUSION THERAPY | Facility: CLINIC | Age: 79
End: 2024-02-23

## 2024-02-23 ENCOUNTER — OUTPATIENT (OUTPATIENT)
Dept: OUTPATIENT SERVICES | Facility: HOSPITAL | Age: 79
LOS: 1 days | End: 2024-02-23
Payer: MEDICARE

## 2024-02-23 VITALS — SYSTOLIC BLOOD PRESSURE: 117 MMHG | HEART RATE: 84 BPM | TEMPERATURE: 98 F | DIASTOLIC BLOOD PRESSURE: 53 MMHG

## 2024-02-23 DIAGNOSIS — Z90.49 ACQUIRED ABSENCE OF OTHER SPECIFIED PARTS OF DIGESTIVE TRACT: Chronic | ICD-10-CM

## 2024-02-23 DIAGNOSIS — Z87.19 PERSONAL HISTORY OF OTHER DISEASES OF THE DIGESTIVE SYSTEM: Chronic | ICD-10-CM

## 2024-02-23 DIAGNOSIS — Z95.828 PRESENCE OF OTHER VASCULAR IMPLANTS AND GRAFTS: Chronic | ICD-10-CM

## 2024-02-23 DIAGNOSIS — Z98.890 OTHER SPECIFIED POSTPROCEDURAL STATES: Chronic | ICD-10-CM

## 2024-02-23 DIAGNOSIS — D64.9 ANEMIA, UNSPECIFIED: ICD-10-CM

## 2024-02-23 DIAGNOSIS — Z98.891 HISTORY OF UTERINE SCAR FROM PREVIOUS SURGERY: Chronic | ICD-10-CM

## 2024-02-23 PROCEDURE — 36415 COLL VENOUS BLD VENIPUNCTURE: CPT

## 2024-02-23 PROCEDURE — 86902 BLOOD TYPE ANTIGEN DONOR EA: CPT

## 2024-02-23 PROCEDURE — 96365 THER/PROPH/DIAG IV INF INIT: CPT

## 2024-02-23 PROCEDURE — 86922 COMPATIBILITY TEST ANTIGLOB: CPT

## 2024-02-23 PROCEDURE — 36430 TRANSFUSION BLD/BLD COMPNT: CPT

## 2024-02-23 PROCEDURE — 96372 THER/PROPH/DIAG INJ SC/IM: CPT

## 2024-02-23 PROCEDURE — P9040: CPT

## 2024-02-23 RX ORDER — ERYTHROPOIETIN 10000 [IU]/ML
30000 INJECTION, SOLUTION INTRAVENOUS; SUBCUTANEOUS ONCE
Refills: 0 | Status: COMPLETED | OUTPATIENT
Start: 2024-02-23 | End: 2024-02-23

## 2024-02-23 RX ORDER — IRON SUCROSE 20 MG/ML
200 INJECTION, SOLUTION INTRAVENOUS ONCE
Refills: 0 | Status: COMPLETED | OUTPATIENT
Start: 2024-02-23 | End: 2024-02-23

## 2024-02-23 RX ADMIN — IRON SUCROSE 220 MILLIGRAM(S): 20 INJECTION, SOLUTION INTRAVENOUS at 12:50

## 2024-02-23 RX ADMIN — ERYTHROPOIETIN 30000 UNIT(S): 10000 INJECTION, SOLUTION INTRAVENOUS; SUBCUTANEOUS at 12:50

## 2024-02-23 RX ADMIN — IRON SUCROSE 200 MILLIGRAM(S): 20 INJECTION, SOLUTION INTRAVENOUS at 13:20

## 2024-02-26 ENCOUNTER — APPOINTMENT (OUTPATIENT)
Dept: GASTROENTEROLOGY | Facility: CLINIC | Age: 79
End: 2024-02-26
Payer: MEDICARE

## 2024-02-26 VITALS — BODY MASS INDEX: 33.12 KG/M2 | WEIGHT: 211 LBS | HEIGHT: 67 IN

## 2024-02-26 DIAGNOSIS — E61.1 IRON DEFICIENCY: ICD-10-CM

## 2024-02-26 DIAGNOSIS — Q27.30 ARTERIOVENOUS MALFORMATION, SITE UNSPECIFIED: ICD-10-CM

## 2024-02-26 PROCEDURE — 99214 OFFICE O/P EST MOD 30 MIN: CPT

## 2024-02-26 RX ORDER — SODIUM SULFATE, POTASSIUM SULFATE AND MAGNESIUM SULFATE 1.6; 3.13; 17.5 G/177ML; G/177ML; G/177ML
17.5-3.13-1.6 SOLUTION ORAL
Qty: 2 | Refills: 0 | Status: ACTIVE | COMMUNITY
Start: 2024-02-26 | End: 1900-01-01

## 2024-02-26 NOTE — PHYSICAL EXAM
[No Acute Distress] : no acute distress [Alert] : alert [Obese (BMI >= 30)] : obese (BMI >= 30) [Well Developed] : well developed [Sclera] : the sclera and conjunctiva were normal [Normal Lips/Gums] : the lips and gums were normal [Oropharynx] : the oropharynx was normal [Normal Appearance] : the appearance of the neck was normal [No Respiratory Distress] : no respiratory distress [No Neck Mass] : no neck mass was observed [No Acc Muscle Use] : no accessory muscle use [Auscultation Breath Sounds / Voice Sounds] : lungs were clear to auscultation bilaterally [Respiration, Rhythm And Depth] : normal respiratory rhythm and effort [Heart Rate And Rhythm] : heart rate was normal and rhythm regular [Normal S1, S2] : normal S1 and S2 [Murmurs] : no murmurs [None] : no edema [Abdomen Tenderness] : non-tender [Bowel Sounds] : normal bowel sounds [No Masses] : no abdominal mass palpated [Abdomen Soft] : soft [] : no hepatosplenomegaly [Hearing Loss Right Only] : diminished [Hearing Loss Left Only] : dimished [Oriented To Time, Place, And Person] : oriented to person, place, and time

## 2024-02-26 NOTE — HISTORY OF PRESENT ILLNESS
[FreeTextEntry1] : 78 yr old female with Anemia requiring frequent blood transfusions and Iron infusions q 2 weeks was referred here by Hematologist Dr. Gaston initially for a capsule endoscopy. SP EGD and colonoscopy. She was also hospitalized again from 1/6/23-1/9/23 for anemia and there was concern for an obscure GI bleed from AVMs in the duodenum. 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 She has a H/'O rectal bleeding but she denied any bleeding for the past several months.  She was supposed to have a colonoscopy in November but she canceled it due to being ill. She C/O occasional lower abdominal pain, last noted last week. She is still being followed very closely by Hematology Dr. Gaston. Hgb on 2/22/24 was down to 7.4; she was transfused 1 u of blood and was also given IV Iron. She denied heartburn, vomiting, melena, hematochezia, or dysphagia. She lost about 9 lbs in the past 3 months. She has noted decreased appetite and has decreased her po intake.

## 2024-02-26 NOTE — ASSESSMENT
[FreeTextEntry1] : 78 yr old female with Anemia requiring frequent blood transfusions and Iron infusions q 2 weeks was referred here by Hematologist Dr. Gaston initially for a capsule endoscopy. SP EGD and colonoscopy. She was also hospitalized again from 1/6/23-1/9/23 for anemia and there was concern for an obscure GI bleed from AVMs in the duodenum. 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 She has a H/'O rectal bleeding but she denied any bleeding for the past several months.  She was supposed to have a colonoscopy, but she canceled it due to being ill. She C/O occasional lower abdominal pain, last noted last week. She is still being followed very closely by Hematology Dr. Gaston. Hgb on 2/22 was down to 7.4; she was transfused 1 u of blood and was also given IV Iron. She denied heartburn, vomiting, melena, hematochezia, or dysphagia. She lost about 9 lbs in the past 3 months. She has noted decreased appetite and has decreased her po intake.    Iron Deficiency Anemia with recent drop in Hgb - Will schedule a colonoscopy; risks and benefits discussed - Generic suprep/dulcolax ordered - F/U after procedure is done - F/U with Hematologist Dr. Gaston

## 2024-02-27 NOTE — ED PROVIDER NOTE - PROGRESS NOTE DETAILS
Soft NT abdomen persists.  Patient to be discharged from ED. Any available test results were discussed with patient and/or family. Verbal instructions given, including instructions to return to ED immediately for any new, worsening, or concerning symptoms. Patient endorsed understanding. Written discharge instructions additionally given, including follow-up plan.
Statement Selected

## 2024-02-28 NOTE — PROGRESS NOTE ADULT - ASSESSMENT
Scheduled date of egd(as of today): [id number]  Physician performing colonoscopy: alexis  Location of colonoscopy: [facility]  Instructions reviewed with patient by:jesus  Clearances: na   76F PMHx of CKD 4 (baseline Cr 1.7-2.2 from 2022), HFpEF EF 74%, Severe AS, chronic anemia likely due to GI bleed per capsule endoscopy (2/2022) showing duodenum and gastric AVM (outpatient pt receives Procrit 20,000 units every other week, and transfusions every week), erosive gastritis, HTN presenting from NH for acute on chronic anemia a/w chest pain, SOB, worsening LE swelling and orthopnea, having dark stool.      # EVAN on CKD 4 - due to CRS vs AIN - new skin rash on torso and arm   started on prednisone   non oliguric   creat stable   UA neg blood/prot  Kidney sono no hydro  if repeated k > 5.5, start lokelma 5 q 12  phos level at goal, no need for binder  encourage fluid intake / hypernatremic;  hold torsemide if worsening   strict i/o  GI bleed, s/p blood tx , follow h/h, on venofer   # AS - will need a CTA / hold diuretics one day prior to decrease risk of MAX if proceeding  will follow

## 2024-03-01 ENCOUNTER — OUTPATIENT (OUTPATIENT)
Dept: OUTPATIENT SERVICES | Facility: HOSPITAL | Age: 79
LOS: 1 days | End: 2024-03-01
Payer: MEDICARE

## 2024-03-01 ENCOUNTER — APPOINTMENT (OUTPATIENT)
Dept: INFUSION THERAPY | Facility: CLINIC | Age: 79
End: 2024-03-01

## 2024-03-01 VITALS — TEMPERATURE: 97 F | DIASTOLIC BLOOD PRESSURE: 54 MMHG | SYSTOLIC BLOOD PRESSURE: 136 MMHG | HEART RATE: 81 BPM

## 2024-03-01 DIAGNOSIS — Z90.49 ACQUIRED ABSENCE OF OTHER SPECIFIED PARTS OF DIGESTIVE TRACT: Chronic | ICD-10-CM

## 2024-03-01 DIAGNOSIS — Z98.890 OTHER SPECIFIED POSTPROCEDURAL STATES: Chronic | ICD-10-CM

## 2024-03-01 DIAGNOSIS — Z95.828 PRESENCE OF OTHER VASCULAR IMPLANTS AND GRAFTS: Chronic | ICD-10-CM

## 2024-03-01 DIAGNOSIS — D64.9 ANEMIA, UNSPECIFIED: ICD-10-CM

## 2024-03-01 DIAGNOSIS — Z98.891 HISTORY OF UTERINE SCAR FROM PREVIOUS SURGERY: Chronic | ICD-10-CM

## 2024-03-01 DIAGNOSIS — Z87.19 PERSONAL HISTORY OF OTHER DISEASES OF THE DIGESTIVE SYSTEM: Chronic | ICD-10-CM

## 2024-03-01 PROCEDURE — 96372 THER/PROPH/DIAG INJ SC/IM: CPT

## 2024-03-01 RX ORDER — IRON SUCROSE 20 MG/ML
200 INJECTION, SOLUTION INTRAVENOUS ONCE
Refills: 0 | Status: COMPLETED | OUTPATIENT
Start: 2024-03-01 | End: 2024-03-01

## 2024-03-01 RX ORDER — ERYTHROPOIETIN 10000 [IU]/ML
30000 INJECTION, SOLUTION INTRAVENOUS; SUBCUTANEOUS ONCE
Refills: 0 | Status: COMPLETED | OUTPATIENT
Start: 2024-03-01 | End: 2024-03-01

## 2024-03-01 RX ADMIN — IRON SUCROSE 220 MILLIGRAM(S): 20 INJECTION, SOLUTION INTRAVENOUS at 12:40

## 2024-03-01 RX ADMIN — ERYTHROPOIETIN 30000 UNIT(S): 10000 INJECTION, SOLUTION INTRAVENOUS; SUBCUTANEOUS at 12:40

## 2024-03-01 RX ADMIN — IRON SUCROSE 200 MILLIGRAM(S): 20 INJECTION, SOLUTION INTRAVENOUS at 13:10

## 2024-03-02 DIAGNOSIS — D64.9 ANEMIA, UNSPECIFIED: ICD-10-CM

## 2024-03-08 ENCOUNTER — OUTPATIENT (OUTPATIENT)
Dept: OUTPATIENT SERVICES | Facility: HOSPITAL | Age: 79
LOS: 1 days | End: 2024-03-08
Payer: MEDICARE

## 2024-03-08 ENCOUNTER — APPOINTMENT (OUTPATIENT)
Dept: INFUSION THERAPY | Facility: CLINIC | Age: 79
End: 2024-03-08

## 2024-03-08 DIAGNOSIS — Z90.49 ACQUIRED ABSENCE OF OTHER SPECIFIED PARTS OF DIGESTIVE TRACT: Chronic | ICD-10-CM

## 2024-03-08 DIAGNOSIS — D64.9 ANEMIA, UNSPECIFIED: ICD-10-CM

## 2024-03-08 DIAGNOSIS — Z98.890 OTHER SPECIFIED POSTPROCEDURAL STATES: Chronic | ICD-10-CM

## 2024-03-08 DIAGNOSIS — Z98.891 HISTORY OF UTERINE SCAR FROM PREVIOUS SURGERY: Chronic | ICD-10-CM

## 2024-03-08 DIAGNOSIS — Z95.828 PRESENCE OF OTHER VASCULAR IMPLANTS AND GRAFTS: Chronic | ICD-10-CM

## 2024-03-08 DIAGNOSIS — Z87.19 PERSONAL HISTORY OF OTHER DISEASES OF THE DIGESTIVE SYSTEM: Chronic | ICD-10-CM

## 2024-03-08 PROCEDURE — 96372 THER/PROPH/DIAG INJ SC/IM: CPT

## 2024-03-08 PROCEDURE — 96365 THER/PROPH/DIAG IV INF INIT: CPT

## 2024-03-08 RX ORDER — IRON SUCROSE 20 MG/ML
200 INJECTION, SOLUTION INTRAVENOUS ONCE
Refills: 0 | Status: COMPLETED | OUTPATIENT
Start: 2024-03-08 | End: 2024-03-08

## 2024-03-08 RX ORDER — ERYTHROPOIETIN 10000 [IU]/ML
30000 INJECTION, SOLUTION INTRAVENOUS; SUBCUTANEOUS ONCE
Refills: 0 | Status: COMPLETED | OUTPATIENT
Start: 2024-03-08 | End: 2024-03-08

## 2024-03-08 RX ADMIN — IRON SUCROSE 200 MILLIGRAM(S): 20 INJECTION, SOLUTION INTRAVENOUS at 13:30

## 2024-03-08 RX ADMIN — IRON SUCROSE 220 MILLIGRAM(S): 20 INJECTION, SOLUTION INTRAVENOUS at 12:57

## 2024-03-08 RX ADMIN — ERYTHROPOIETIN 30000 UNIT(S): 10000 INJECTION, SOLUTION INTRAVENOUS; SUBCUTANEOUS at 13:00

## 2024-03-15 ENCOUNTER — APPOINTMENT (OUTPATIENT)
Dept: INFUSION THERAPY | Facility: CLINIC | Age: 79
End: 2024-03-15

## 2024-03-15 ENCOUNTER — OUTPATIENT (OUTPATIENT)
Dept: OUTPATIENT SERVICES | Facility: HOSPITAL | Age: 79
LOS: 1 days | End: 2024-03-15
Payer: MEDICARE

## 2024-03-15 DIAGNOSIS — Z98.890 OTHER SPECIFIED POSTPROCEDURAL STATES: Chronic | ICD-10-CM

## 2024-03-15 DIAGNOSIS — Z90.49 ACQUIRED ABSENCE OF OTHER SPECIFIED PARTS OF DIGESTIVE TRACT: Chronic | ICD-10-CM

## 2024-03-15 DIAGNOSIS — D64.9 ANEMIA, UNSPECIFIED: ICD-10-CM

## 2024-03-15 DIAGNOSIS — Z87.19 PERSONAL HISTORY OF OTHER DISEASES OF THE DIGESTIVE SYSTEM: Chronic | ICD-10-CM

## 2024-03-15 DIAGNOSIS — Z98.891 HISTORY OF UTERINE SCAR FROM PREVIOUS SURGERY: Chronic | ICD-10-CM

## 2024-03-15 DIAGNOSIS — Z95.828 PRESENCE OF OTHER VASCULAR IMPLANTS AND GRAFTS: Chronic | ICD-10-CM

## 2024-03-15 PROCEDURE — 96372 THER/PROPH/DIAG INJ SC/IM: CPT

## 2024-03-15 PROCEDURE — P9040: CPT

## 2024-03-15 PROCEDURE — 36430 TRANSFUSION BLD/BLD COMPNT: CPT

## 2024-03-15 RX ORDER — ERYTHROPOIETIN 10000 [IU]/ML
30000 INJECTION, SOLUTION INTRAVENOUS; SUBCUTANEOUS ONCE
Refills: 0 | Status: COMPLETED | OUTPATIENT
Start: 2024-03-15 | End: 2024-03-15

## 2024-03-15 RX ORDER — IRON SUCROSE 20 MG/ML
200 INJECTION, SOLUTION INTRAVENOUS ONCE
Refills: 0 | Status: COMPLETED | OUTPATIENT
Start: 2024-03-15 | End: 2024-06-14

## 2024-03-15 RX ADMIN — ERYTHROPOIETIN 30000 UNIT(S): 10000 INJECTION, SOLUTION INTRAVENOUS; SUBCUTANEOUS at 14:11

## 2024-03-16 DIAGNOSIS — D64.9 ANEMIA, UNSPECIFIED: ICD-10-CM

## 2024-03-21 ENCOUNTER — APPOINTMENT (OUTPATIENT)
Dept: HEMATOLOGY ONCOLOGY | Facility: CLINIC | Age: 79
End: 2024-03-21

## 2024-03-22 ENCOUNTER — APPOINTMENT (OUTPATIENT)
Dept: INFUSION THERAPY | Facility: CLINIC | Age: 79
End: 2024-03-22

## 2024-03-22 ENCOUNTER — OUTPATIENT (OUTPATIENT)
Dept: OUTPATIENT SERVICES | Facility: HOSPITAL | Age: 79
LOS: 1 days | End: 2024-03-22
Payer: MEDICARE

## 2024-03-22 VITALS — DIASTOLIC BLOOD PRESSURE: 63 MMHG | HEART RATE: 75 BPM | TEMPERATURE: 98 F | SYSTOLIC BLOOD PRESSURE: 149 MMHG

## 2024-03-22 DIAGNOSIS — D64.9 ANEMIA, UNSPECIFIED: ICD-10-CM

## 2024-03-22 DIAGNOSIS — Z95.828 PRESENCE OF OTHER VASCULAR IMPLANTS AND GRAFTS: Chronic | ICD-10-CM

## 2024-03-22 DIAGNOSIS — Z90.49 ACQUIRED ABSENCE OF OTHER SPECIFIED PARTS OF DIGESTIVE TRACT: Chronic | ICD-10-CM

## 2024-03-22 DIAGNOSIS — Z87.19 PERSONAL HISTORY OF OTHER DISEASES OF THE DIGESTIVE SYSTEM: Chronic | ICD-10-CM

## 2024-03-22 DIAGNOSIS — Z98.891 HISTORY OF UTERINE SCAR FROM PREVIOUS SURGERY: Chronic | ICD-10-CM

## 2024-03-22 DIAGNOSIS — Z98.890 OTHER SPECIFIED POSTPROCEDURAL STATES: Chronic | ICD-10-CM

## 2024-03-22 PROCEDURE — 96365 THER/PROPH/DIAG IV INF INIT: CPT

## 2024-03-22 PROCEDURE — 96372 THER/PROPH/DIAG INJ SC/IM: CPT

## 2024-03-22 RX ORDER — IRON SUCROSE 20 MG/ML
200 INJECTION, SOLUTION INTRAVENOUS ONCE
Refills: 0 | Status: COMPLETED | OUTPATIENT
Start: 2024-03-22 | End: 2024-03-22

## 2024-03-22 RX ORDER — ERYTHROPOIETIN 10000 [IU]/ML
30000 INJECTION, SOLUTION INTRAVENOUS; SUBCUTANEOUS ONCE
Refills: 0 | Status: COMPLETED | OUTPATIENT
Start: 2024-03-22 | End: 2024-03-22

## 2024-03-22 RX ADMIN — IRON SUCROSE 220 MILLIGRAM(S): 20 INJECTION, SOLUTION INTRAVENOUS at 13:28

## 2024-03-22 RX ADMIN — ERYTHROPOIETIN 30000 UNIT(S): 10000 INJECTION, SOLUTION INTRAVENOUS; SUBCUTANEOUS at 13:26

## 2024-03-22 RX ADMIN — IRON SUCROSE 200 MILLIGRAM(S): 20 INJECTION, SOLUTION INTRAVENOUS at 13:58

## 2024-03-28 ENCOUNTER — APPOINTMENT (OUTPATIENT)
Age: 79
End: 2024-03-28

## 2024-03-28 ENCOUNTER — OUTPATIENT (OUTPATIENT)
Dept: OUTPATIENT SERVICES | Facility: HOSPITAL | Age: 79
LOS: 1 days | End: 2024-03-28
Payer: MEDICARE

## 2024-03-28 DIAGNOSIS — D64.9 ANEMIA, UNSPECIFIED: ICD-10-CM

## 2024-03-28 DIAGNOSIS — Z98.890 OTHER SPECIFIED POSTPROCEDURAL STATES: Chronic | ICD-10-CM

## 2024-03-28 DIAGNOSIS — Z98.891 HISTORY OF UTERINE SCAR FROM PREVIOUS SURGERY: Chronic | ICD-10-CM

## 2024-03-28 DIAGNOSIS — Z90.49 ACQUIRED ABSENCE OF OTHER SPECIFIED PARTS OF DIGESTIVE TRACT: Chronic | ICD-10-CM

## 2024-03-28 LAB
HCT VFR BLD CALC: 27.8 %
HCT VFR BLD CALC: 32.8 %
HGB BLD-MCNC: 10.2 G/DL
HGB BLD-MCNC: 8.7 G/DL
MCHC RBC-ENTMCNC: 27.3 PG
MCHC RBC-ENTMCNC: 27.5 PG
MCHC RBC-ENTMCNC: 31.1 G/DL
MCHC RBC-ENTMCNC: 31.3 G/DL
MCV RBC AUTO: 87.7 FL
MCV RBC AUTO: 88 FL
PLATELET # BLD AUTO: 115 K/UL
PLATELET # BLD AUTO: 125 K/UL
PMV BLD: 9.2 FL
PMV BLD: 9.7 FL
RBC # BLD: 3.16 M/UL
RBC # BLD: 3.74 M/UL
RBC # FLD: 17.5 %
RBC # FLD: 17.7 %
WBC # FLD AUTO: 5.19 K/UL
WBC # FLD AUTO: 5.73 K/UL

## 2024-03-28 PROCEDURE — 36416 COLLJ CAPILLARY BLOOD SPEC: CPT

## 2024-03-28 PROCEDURE — 86922 COMPATIBILITY TEST ANTIGLOB: CPT

## 2024-03-28 PROCEDURE — 86902 BLOOD TYPE ANTIGEN DONOR EA: CPT

## 2024-03-29 ENCOUNTER — APPOINTMENT (OUTPATIENT)
Age: 79
End: 2024-03-29

## 2024-03-29 ENCOUNTER — OUTPATIENT (OUTPATIENT)
Dept: OUTPATIENT SERVICES | Facility: HOSPITAL | Age: 79
LOS: 1 days | End: 2024-03-29
Payer: MEDICARE

## 2024-03-29 VITALS — HEART RATE: 85 BPM | DIASTOLIC BLOOD PRESSURE: 54 MMHG | TEMPERATURE: 98 F | SYSTOLIC BLOOD PRESSURE: 143 MMHG

## 2024-03-29 DIAGNOSIS — Z87.19 PERSONAL HISTORY OF OTHER DISEASES OF THE DIGESTIVE SYSTEM: Chronic | ICD-10-CM

## 2024-03-29 DIAGNOSIS — D64.9 ANEMIA, UNSPECIFIED: ICD-10-CM

## 2024-03-29 DIAGNOSIS — Z90.49 ACQUIRED ABSENCE OF OTHER SPECIFIED PARTS OF DIGESTIVE TRACT: Chronic | ICD-10-CM

## 2024-03-29 DIAGNOSIS — Z95.828 PRESENCE OF OTHER VASCULAR IMPLANTS AND GRAFTS: Chronic | ICD-10-CM

## 2024-03-29 PROCEDURE — 36430 TRANSFUSION BLD/BLD COMPNT: CPT

## 2024-03-29 PROCEDURE — 96372 THER/PROPH/DIAG INJ SC/IM: CPT

## 2024-03-29 PROCEDURE — P9040: CPT

## 2024-03-29 RX ORDER — ERYTHROPOIETIN 10000 [IU]/ML
30000 INJECTION, SOLUTION INTRAVENOUS; SUBCUTANEOUS ONCE
Refills: 0 | Status: COMPLETED | OUTPATIENT
Start: 2024-03-29 | End: 2024-03-29

## 2024-03-29 RX ADMIN — ERYTHROPOIETIN 30000 UNIT(S): 10000 INJECTION, SOLUTION INTRAVENOUS; SUBCUTANEOUS at 13:29

## 2024-03-30 DIAGNOSIS — D64.9 ANEMIA, UNSPECIFIED: ICD-10-CM

## 2024-04-01 DIAGNOSIS — D64.9 ANEMIA, UNSPECIFIED: ICD-10-CM

## 2024-04-01 NOTE — ED ADULT TRIAGE NOTE - HEIGHT IN INCHES
Nutrition Rx & Recommendations:  Diet: high calorie, high protein, easy on the stomach   Small, frequent meals/snacks may be easier to tolerate than 3 large daily meals.  Aim for 5-6 small meals per day (every 2-3 hours).  Include protein at all meals/snacks.  Include a variety of foods (as tolerated/allowed by diet).  Incorporate physical activity as able/allowed.  Stay hydrated by sipping fluids of choice/tolerance throughout the day.  Liquid nutrition may be better tolerated than solids at times.  Alter food choices and eating patterns to accommodate changing needs.  Light physical activity (such as walking) is encouraged, as able/tolerated.  Weigh yourself regularly. If you notice weight loss, make an effort to increase your daily food/calorie intake. If you continue to notice loss after these efforts, reach out to your dietitian to establish a plan to stabilize weight.  Food and drink that are easy on the stomach: clear broth (chicken, vegetable, bone, mushroom, beef), juice (caution with acidic juices), potatoes, pretzels, crackers, cereal (Corn Flakes, Rice Chex, Rice Crispies, Cheerios), oatmeal or cream of wheat, white bread or toast, white rice, cooked vegetables (caution with gas producing vegetables), plain pasta, eggs, peanut butter, boiled chicken or turkey, soft cheeses. Foods to avoid: spicy, fried/greasy, high in added sugar, acidic.   Keep a log of foods that you can tolerate and ones that you cannot tolerate.   Continue Pedialyte to aid in hydration.     Follow Up Plan: 4/15/24 during hydration   Recommend Referral to Other Providers: none at this time   7

## 2024-04-05 ENCOUNTER — OUTPATIENT (OUTPATIENT)
Dept: OUTPATIENT SERVICES | Facility: HOSPITAL | Age: 79
LOS: 1 days | End: 2024-04-05
Payer: MEDICARE

## 2024-04-05 ENCOUNTER — APPOINTMENT (OUTPATIENT)
Age: 79
End: 2024-04-05

## 2024-04-05 DIAGNOSIS — Z98.891 HISTORY OF UTERINE SCAR FROM PREVIOUS SURGERY: Chronic | ICD-10-CM

## 2024-04-05 DIAGNOSIS — Z98.890 OTHER SPECIFIED POSTPROCEDURAL STATES: Chronic | ICD-10-CM

## 2024-04-05 DIAGNOSIS — Z87.19 PERSONAL HISTORY OF OTHER DISEASES OF THE DIGESTIVE SYSTEM: Chronic | ICD-10-CM

## 2024-04-05 DIAGNOSIS — D64.9 ANEMIA, UNSPECIFIED: ICD-10-CM

## 2024-04-05 PROCEDURE — 96365 THER/PROPH/DIAG IV INF INIT: CPT

## 2024-04-05 PROCEDURE — 96372 THER/PROPH/DIAG INJ SC/IM: CPT

## 2024-04-05 RX ORDER — ERYTHROPOIETIN 4000 [IU]/ML
30000 INJECTION, SOLUTION INTRAVENOUS; SUBCUTANEOUS ONCE
Refills: 0 | Status: COMPLETED | OUTPATIENT
Start: 2024-04-05 | End: 2024-04-05

## 2024-04-05 RX ORDER — IRON SUCROSE 20 MG/ML
200 INJECTION, SOLUTION INTRAVENOUS ONCE
Refills: 0 | Status: COMPLETED | OUTPATIENT
Start: 2024-04-05 | End: 2024-04-05

## 2024-04-05 RX ADMIN — ERYTHROPOIETIN 30000 UNIT(S): 4000 INJECTION, SOLUTION INTRAVENOUS; SUBCUTANEOUS at 14:43

## 2024-04-05 RX ADMIN — IRON SUCROSE 220 MILLIGRAM(S): 20 INJECTION, SOLUTION INTRAVENOUS at 14:23

## 2024-04-05 RX ADMIN — IRON SUCROSE 200 MILLIGRAM(S): 20 INJECTION, SOLUTION INTRAVENOUS at 14:55

## 2024-04-06 DIAGNOSIS — D64.9 ANEMIA, UNSPECIFIED: ICD-10-CM

## 2024-04-12 ENCOUNTER — APPOINTMENT (OUTPATIENT)
Age: 79
End: 2024-04-12
Payer: MEDICARE

## 2024-04-12 ENCOUNTER — OUTPATIENT (OUTPATIENT)
Dept: OUTPATIENT SERVICES | Facility: HOSPITAL | Age: 79
LOS: 1 days | End: 2024-04-12
Payer: MEDICARE

## 2024-04-12 VITALS
TEMPERATURE: 97.7 F | WEIGHT: 211 LBS | HEIGHT: 67 IN | HEART RATE: 74 BPM | BODY MASS INDEX: 33.12 KG/M2 | SYSTOLIC BLOOD PRESSURE: 137 MMHG | OXYGEN SATURATION: 100 % | RESPIRATION RATE: 16 BRPM | DIASTOLIC BLOOD PRESSURE: 73 MMHG

## 2024-04-12 DIAGNOSIS — D64.9 ANEMIA, UNSPECIFIED: ICD-10-CM

## 2024-04-12 DIAGNOSIS — Z98.891 HISTORY OF UTERINE SCAR FROM PREVIOUS SURGERY: Chronic | ICD-10-CM

## 2024-04-12 DIAGNOSIS — Z90.49 ACQUIRED ABSENCE OF OTHER SPECIFIED PARTS OF DIGESTIVE TRACT: Chronic | ICD-10-CM

## 2024-04-12 DIAGNOSIS — Z98.890 OTHER SPECIFIED POSTPROCEDURAL STATES: Chronic | ICD-10-CM

## 2024-04-12 DIAGNOSIS — Z87.19 PERSONAL HISTORY OF OTHER DISEASES OF THE DIGESTIVE SYSTEM: Chronic | ICD-10-CM

## 2024-04-12 DIAGNOSIS — Z95.828 PRESENCE OF OTHER VASCULAR IMPLANTS AND GRAFTS: Chronic | ICD-10-CM

## 2024-04-12 PROCEDURE — 99213 OFFICE O/P EST LOW 20 MIN: CPT

## 2024-04-12 PROCEDURE — 36430 TRANSFUSION BLD/BLD COMPNT: CPT

## 2024-04-12 PROCEDURE — 96372 THER/PROPH/DIAG INJ SC/IM: CPT

## 2024-04-12 PROCEDURE — 36415 COLL VENOUS BLD VENIPUNCTURE: CPT

## 2024-04-12 PROCEDURE — P9040: CPT

## 2024-04-12 PROCEDURE — 86902 BLOOD TYPE ANTIGEN DONOR EA: CPT

## 2024-04-12 PROCEDURE — 96365 THER/PROPH/DIAG IV INF INIT: CPT

## 2024-04-12 PROCEDURE — 86922 COMPATIBILITY TEST ANTIGLOB: CPT

## 2024-04-12 PROCEDURE — 99213 OFFICE O/P EST LOW 20 MIN: CPT | Mod: 25

## 2024-04-12 RX ORDER — ERYTHROPOIETIN 4000 [IU]/ML
30000 INJECTION, SOLUTION INTRAVENOUS; SUBCUTANEOUS ONCE
Refills: 0 | Status: COMPLETED | OUTPATIENT
Start: 2024-04-12 | End: 2024-04-12

## 2024-04-12 RX ORDER — IRON SUCROSE 20 MG/ML
200 INJECTION, SOLUTION INTRAVENOUS ONCE
Refills: 0 | Status: COMPLETED | OUTPATIENT
Start: 2024-04-12 | End: 2024-04-12

## 2024-04-12 RX ADMIN — IRON SUCROSE 110 MILLIGRAM(S): 20 INJECTION, SOLUTION INTRAVENOUS at 12:40

## 2024-04-12 RX ADMIN — IRON SUCROSE 200 MILLIGRAM(S): 20 INJECTION, SOLUTION INTRAVENOUS at 13:10

## 2024-04-12 RX ADMIN — ERYTHROPOIETIN 30000 UNIT(S): 4000 INJECTION, SOLUTION INTRAVENOUS; SUBCUTANEOUS at 12:45

## 2024-04-12 NOTE — HISTORY OF PRESENT ILLNESS
[de-identified] : bruce is a 74 year old white female with hypertension, chronic kidney disease, morbidly obese presents today with normocytic anemia. \par  Her most recent CBC 04/01/2020 shows WBC -5.66, HB of 6.7, MCV -86.3, RDW - 17.3, platelet count- 196. Her Hb was normal until 08/2019. In October 2019 she noticed black tarry stools and was feeling tired. She went to ER and her Hb was 5.3. She was given 3 units of PRBC. Her iron studies at that showed ferritin of 8 and percent saturation of 5. She had EGD and colonoscopy which did not reveal any bleeding lesions. Following that event as per patient she was not given iron (?not sure why). She was readmitted to hospital in 01/2020 for SOB on exertion and her HB was noted to 6.0 again. She was given 2 units and her iron studies were consistent with DARRIN. B12 and folate were normal. Immunofixation showed weak IgG lambda migrating para protein. Free light chain ratio 2.1. Normal calcium. \par  EGD and VCE was done. EGD revealed gastritis and VCE showed bleeding in small bowel. \par  She was started on oral iron and she took for about three months. \par  Her latest ferritin done in feb 2020 was 24 and percent saturation was 72% and her hb improved to 8.8. She also has push enteroscopy in 02/2020 and no bleeding lesions were found. Was recommended to have repeat colonoscopy and double balloon enteroscopy if she bleeds again.\par  She went for blood work again on 04/01/2020 as she was feeling weak and having SOB and her hb dropped to 6.7. She was told by PMD to start on PROCRIT 10,000 units M-W-F. She was told her iron levels are good and she can stop iron. \par  \par  Today she feels very tired and her SOB is worse. She denies any melena or BRBPR in last few days. She does have anal fissure and hemorrhoids and bleeds when she is constipated. She denies hematuria or post menopausal bleeding. Denies weight loss. Does not take any NSAIDs or aspirin. She has not followed up with nephrology before.\par  Family history is significant for breast cancer in her mother. She is a former smoker stopped 20 years ago.\par  She is uptodate with mammogram and Pap smear. \par   [de-identified] : 09/17/2020 Patient returns for follow up , she feels better after venofer X 4 and on EPO 10,000 weekly , Hb is up to 10 .   10/15/2020 Patient returns for follow up 2 weeks after last dose of EPO , today's Hb is 10.7 . she offers no new complaints .   04/08/2021 Patient returns for follow up for anemia on EPO and iron replacement . Hb is 10.8 . she complains of mild left arm pain after she started to self check her BP . ahe meds were recently adjusted by her PMD .   08/31/2021 patient returns for follow up , she offers no new complaints , her Hgb is slowly trending down after last transfusion and venofer X1 for ferritin : 35 .   11/18/2021 patient returns for follow up complaining of weakness and dyspnea on minimal exertion , still with lower extremity edema R > L . Hgb is 7.1 2 weeks after transfusion and despite venofer and EPO . She denies melena or hematochezia . Of note she had suspected small bowel bleeding on enteroscopy or capsule endoscopy and was intolerant to push enteroscopy ( hypotension ? )  2/14/22- Patient is here for follow up visit for management of DARRIN. Hgb is 6.7  3 days only after last transfusion , she reports several episodes of bleeding presumably from hemorrhoids and is using topical medications , Ferritin levels remain low despite venofer . She complains of weakness, bilateral leg edema . no chest pain or SOB .   12/2/21: patient returns for follow up for DARRIN secondary  obscure GI bleeding  and CKD.  She is feeling better today. We will continue  Venofer infusion weekly X 3 and Procrit. Close monitor CBC on weekly basis with possible blood transfusion if required.   5/19/2022 Patient returns for follow up , she lost her  last week to lung cancer . He Hgb is still 7.2  two weeks after transfusion and despite weekly venofer . Ferritin is trending down . She is noted with weight loss and complains of abdominal cramps and pain radiating from epigastric area and RUQ , radiating to the back . Of note CT scan from 1/2022 showed prominent adenopathy involving mesenteric and retroperitoneal lymph nodes.    8/8/2022 Patient returns for chronic iron deficiency anemia , she is requiring transfusion every 2 weeks . today's Hgb is 6.9 however she denies hematochezia , increased weakness , dizziness , chest pain or palpitations . She continues with mild RUQ pain radiating to the back.    4/13/2023 patient returns for follow after recent admission for melena , she underwent push enteroscopy 	A few small bleeding diffuse angioectasias were seen in the antrum. The lesions were distributed in a watermelon-stomach pattern. Hemostasis was performed successfully at the antrum. 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 APC applied to bleeding sites with excellent hemostasis.   Multiple small AVMs were noted in duodenal bulb and second part of duodenum. , No blood was noted in jejunum. 2 small non-bleeding AVMs were noted in proximal jejunum.   She feels well today , no melena , no weakness or SOB . Edema improved on lasix 40 mg daily .     5/12/2023 Patient returns for follow up one week after transfusion of 2 units with recurrence of severe weakness, exertional dyspnea . Hgb is down to 5.5 ,She insists no change in stool color . ferritin is still < 100 despite weekly venofer . She is also on EPO for severe CKD , she was seen recently by renal and recommended blood and urine tests .   6/16/2023 Patient returns for follow up one week after last transfusion , Hgb is 7.8 and unusual for her , bleeding has probably subsided or diminished after the latest enteroscopy , multiple bleeding AVM's were treated with APC and endoclip.  She complains mainly of mild dry cough , mild peripheral edema ( R > L )   8/24/2023: Patient returns for follow up, her last blood transfusion was 2 weeks ago. She feels overall well, states that she has some drowsiness after having an EGD done yesterday. Colonoscopy/capsule endoscopy appointments still pending. She received her most recent dose of iron last week, ferritin 133.  11/16/2023: Patient returns for follow up, her last blood transfusion was one week ago. She also receives IV iron with her blood transfusions. Today she reports shortness of breath and fatigue, however she is still as active as she can be. She is supposed to be receiving a colonoscopy next week pending her Hgb level. Last ferritin 113.   4/12/2024: Patient returns for follow up, receiving weekly BLTs/iron/EPO. She feels overall well today, no new complaints. She reports only mild fatigue when she thinks she is due to receive BLT. She reports weight loss due to dietary changes.

## 2024-04-12 NOTE — ASSESSMENT
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 Worsening kidney function.  Did not tolerate Tranexamic acid, however compliance is questionable.  Receiving IV iron as needed, most recent ferritin level 88  Following with GI, endoscopy with endoclipping performed August 2023, repeat colonoscopy and capsule endoscopy still pending. Hgb 7.0. Also receiving IV iron almost weekly.   PLAN: --Will proceed with 1 unit PRBCs today along with IV iron.  --Continue with weekly EPO --Continue with weekly home labs on Thursdays --Follow up with GI, next colonoscopy due in June 2024  RTC in 2 months  Continue weekly CBC and possible BLT

## 2024-04-12 NOTE — PHYSICAL EXAM
[Obese] : obese [Normal] : normoactive bowel sounds, soft and nontender, no hepatosplenomegaly or masses appreciated [de-identified] : grade 3/6 systolic murmur

## 2024-04-19 ENCOUNTER — APPOINTMENT (OUTPATIENT)
Age: 79
End: 2024-04-19

## 2024-04-19 ENCOUNTER — OUTPATIENT (OUTPATIENT)
Dept: OUTPATIENT SERVICES | Facility: HOSPITAL | Age: 79
LOS: 1 days | End: 2024-04-19
Payer: MEDICARE

## 2024-04-19 DIAGNOSIS — Z90.49 ACQUIRED ABSENCE OF OTHER SPECIFIED PARTS OF DIGESTIVE TRACT: Chronic | ICD-10-CM

## 2024-04-19 DIAGNOSIS — Z95.828 PRESENCE OF OTHER VASCULAR IMPLANTS AND GRAFTS: Chronic | ICD-10-CM

## 2024-04-19 DIAGNOSIS — Z87.19 PERSONAL HISTORY OF OTHER DISEASES OF THE DIGESTIVE SYSTEM: Chronic | ICD-10-CM

## 2024-04-19 DIAGNOSIS — Z98.890 OTHER SPECIFIED POSTPROCEDURAL STATES: Chronic | ICD-10-CM

## 2024-04-19 DIAGNOSIS — D64.9 ANEMIA, UNSPECIFIED: ICD-10-CM

## 2024-04-19 DIAGNOSIS — Z98.891 HISTORY OF UTERINE SCAR FROM PREVIOUS SURGERY: Chronic | ICD-10-CM

## 2024-04-19 PROCEDURE — 96365 THER/PROPH/DIAG IV INF INIT: CPT

## 2024-04-19 PROCEDURE — 86902 BLOOD TYPE ANTIGEN DONOR EA: CPT

## 2024-04-19 PROCEDURE — 96372 THER/PROPH/DIAG INJ SC/IM: CPT

## 2024-04-19 PROCEDURE — 36430 TRANSFUSION BLD/BLD COMPNT: CPT

## 2024-04-19 PROCEDURE — P9040: CPT

## 2024-04-19 PROCEDURE — 86922 COMPATIBILITY TEST ANTIGLOB: CPT

## 2024-04-19 RX ORDER — ERYTHROPOIETIN 4000 [IU]/ML
30000 INJECTION, SOLUTION INTRAVENOUS; SUBCUTANEOUS ONCE
Refills: 0 | Status: COMPLETED | OUTPATIENT
Start: 2024-04-19 | End: 2024-04-19

## 2024-04-19 RX ORDER — IRON SUCROSE 20 MG/ML
200 INJECTION, SOLUTION INTRAVENOUS ONCE
Refills: 0 | Status: COMPLETED | OUTPATIENT
Start: 2024-04-19 | End: 2024-04-19

## 2024-04-19 RX ADMIN — ERYTHROPOIETIN 30000 UNIT(S): 4000 INJECTION, SOLUTION INTRAVENOUS; SUBCUTANEOUS at 13:03

## 2024-04-19 RX ADMIN — IRON SUCROSE 220 MILLIGRAM(S): 20 INJECTION, SOLUTION INTRAVENOUS at 13:03

## 2024-04-26 ENCOUNTER — APPOINTMENT (OUTPATIENT)
Age: 79
End: 2024-04-26

## 2024-04-26 ENCOUNTER — OUTPATIENT (OUTPATIENT)
Dept: OUTPATIENT SERVICES | Facility: HOSPITAL | Age: 79
LOS: 1 days | End: 2024-04-26
Payer: MEDICARE

## 2024-04-26 VITALS — SYSTOLIC BLOOD PRESSURE: 153 MMHG | DIASTOLIC BLOOD PRESSURE: 62 MMHG | HEART RATE: 76 BPM | TEMPERATURE: 98 F

## 2024-04-26 DIAGNOSIS — Z98.890 OTHER SPECIFIED POSTPROCEDURAL STATES: Chronic | ICD-10-CM

## 2024-04-26 DIAGNOSIS — Z90.49 ACQUIRED ABSENCE OF OTHER SPECIFIED PARTS OF DIGESTIVE TRACT: Chronic | ICD-10-CM

## 2024-04-26 DIAGNOSIS — Z87.19 PERSONAL HISTORY OF OTHER DISEASES OF THE DIGESTIVE SYSTEM: Chronic | ICD-10-CM

## 2024-04-26 DIAGNOSIS — Z98.891 HISTORY OF UTERINE SCAR FROM PREVIOUS SURGERY: Chronic | ICD-10-CM

## 2024-04-26 DIAGNOSIS — D64.9 ANEMIA, UNSPECIFIED: ICD-10-CM

## 2024-04-26 DIAGNOSIS — E61.1 IRON DEFICIENCY: ICD-10-CM

## 2024-04-26 PROCEDURE — 96365 THER/PROPH/DIAG IV INF INIT: CPT

## 2024-04-26 PROCEDURE — 96372 THER/PROPH/DIAG INJ SC/IM: CPT

## 2024-04-26 RX ORDER — IRON SUCROSE 20 MG/ML
200 INJECTION, SOLUTION INTRAVENOUS ONCE
Refills: 0 | Status: COMPLETED | OUTPATIENT
Start: 2024-04-26 | End: 2024-04-26

## 2024-04-26 RX ORDER — ERYTHROPOIETIN 4000 [IU]/ML
30000 INJECTION, SOLUTION INTRAVENOUS; SUBCUTANEOUS ONCE
Refills: 0 | Status: COMPLETED | OUTPATIENT
Start: 2024-04-26 | End: 2024-04-26

## 2024-04-26 RX ADMIN — IRON SUCROSE 220 MILLIGRAM(S): 20 INJECTION, SOLUTION INTRAVENOUS at 13:17

## 2024-04-26 RX ADMIN — ERYTHROPOIETIN 30000 UNIT(S): 4000 INJECTION, SOLUTION INTRAVENOUS; SUBCUTANEOUS at 13:17

## 2024-04-26 RX ADMIN — IRON SUCROSE 200 MILLIGRAM(S): 20 INJECTION, SOLUTION INTRAVENOUS at 13:47

## 2024-04-27 DIAGNOSIS — E61.1 IRON DEFICIENCY: ICD-10-CM

## 2024-04-30 ENCOUNTER — NON-APPOINTMENT (OUTPATIENT)
Age: 79
End: 2024-04-30

## 2024-05-03 ENCOUNTER — OUTPATIENT (OUTPATIENT)
Dept: OUTPATIENT SERVICES | Facility: HOSPITAL | Age: 79
LOS: 1 days | End: 2024-05-03
Payer: MEDICARE

## 2024-05-03 ENCOUNTER — APPOINTMENT (OUTPATIENT)
Age: 79
End: 2024-05-03

## 2024-05-03 DIAGNOSIS — Z95.828 PRESENCE OF OTHER VASCULAR IMPLANTS AND GRAFTS: Chronic | ICD-10-CM

## 2024-05-03 DIAGNOSIS — Z98.890 OTHER SPECIFIED POSTPROCEDURAL STATES: Chronic | ICD-10-CM

## 2024-05-03 DIAGNOSIS — Z90.49 ACQUIRED ABSENCE OF OTHER SPECIFIED PARTS OF DIGESTIVE TRACT: Chronic | ICD-10-CM

## 2024-05-03 DIAGNOSIS — Z87.19 PERSONAL HISTORY OF OTHER DISEASES OF THE DIGESTIVE SYSTEM: Chronic | ICD-10-CM

## 2024-05-03 DIAGNOSIS — E61.1 IRON DEFICIENCY: ICD-10-CM

## 2024-05-03 PROCEDURE — P9016: CPT

## 2024-05-03 PROCEDURE — 36415 COLL VENOUS BLD VENIPUNCTURE: CPT

## 2024-05-03 PROCEDURE — 96372 THER/PROPH/DIAG INJ SC/IM: CPT

## 2024-05-03 PROCEDURE — 86922 COMPATIBILITY TEST ANTIGLOB: CPT

## 2024-05-03 PROCEDURE — 36430 TRANSFUSION BLD/BLD COMPNT: CPT

## 2024-05-03 PROCEDURE — 86902 BLOOD TYPE ANTIGEN DONOR EA: CPT

## 2024-05-03 RX ORDER — ERYTHROPOIETIN 4000 [IU]/ML
30000 INJECTION, SOLUTION INTRAVENOUS; SUBCUTANEOUS ONCE
Refills: 0 | Status: COMPLETED | OUTPATIENT
Start: 2024-05-03 | End: 2024-05-03

## 2024-05-03 RX ADMIN — ERYTHROPOIETIN 30000 UNIT(S): 4000 INJECTION, SOLUTION INTRAVENOUS; SUBCUTANEOUS at 14:01

## 2024-05-04 DIAGNOSIS — E61.1 IRON DEFICIENCY: ICD-10-CM

## 2024-05-10 ENCOUNTER — APPOINTMENT (OUTPATIENT)
Age: 79
End: 2024-05-10

## 2024-05-17 ENCOUNTER — OUTPATIENT (OUTPATIENT)
Dept: OUTPATIENT SERVICES | Facility: HOSPITAL | Age: 79
LOS: 1 days | End: 2024-05-17
Payer: MEDICARE

## 2024-05-17 ENCOUNTER — APPOINTMENT (OUTPATIENT)
Age: 79
End: 2024-05-17

## 2024-05-17 VITALS — HEART RATE: 71 BPM | TEMPERATURE: 98 F | DIASTOLIC BLOOD PRESSURE: 66 MMHG | SYSTOLIC BLOOD PRESSURE: 154 MMHG

## 2024-05-17 DIAGNOSIS — Z90.49 ACQUIRED ABSENCE OF OTHER SPECIFIED PARTS OF DIGESTIVE TRACT: Chronic | ICD-10-CM

## 2024-05-17 DIAGNOSIS — E61.1 IRON DEFICIENCY: ICD-10-CM

## 2024-05-17 DIAGNOSIS — Z98.891 HISTORY OF UTERINE SCAR FROM PREVIOUS SURGERY: Chronic | ICD-10-CM

## 2024-05-17 DIAGNOSIS — Z95.828 PRESENCE OF OTHER VASCULAR IMPLANTS AND GRAFTS: Chronic | ICD-10-CM

## 2024-05-17 DIAGNOSIS — Z87.19 PERSONAL HISTORY OF OTHER DISEASES OF THE DIGESTIVE SYSTEM: Chronic | ICD-10-CM

## 2024-05-17 PROCEDURE — 96365 THER/PROPH/DIAG IV INF INIT: CPT

## 2024-05-17 PROCEDURE — 86922 COMPATIBILITY TEST ANTIGLOB: CPT

## 2024-05-17 PROCEDURE — 36415 COLL VENOUS BLD VENIPUNCTURE: CPT

## 2024-05-17 PROCEDURE — 86902 BLOOD TYPE ANTIGEN DONOR EA: CPT

## 2024-05-17 PROCEDURE — 36430 TRANSFUSION BLD/BLD COMPNT: CPT

## 2024-05-17 PROCEDURE — 96372 THER/PROPH/DIAG INJ SC/IM: CPT

## 2024-05-17 PROCEDURE — P9040: CPT

## 2024-05-17 RX ORDER — IRON SUCROSE 20 MG/ML
200 INJECTION, SOLUTION INTRAVENOUS ONCE
Refills: 0 | Status: COMPLETED | OUTPATIENT
Start: 2024-05-17 | End: 2024-05-17

## 2024-05-17 RX ORDER — EPOETIN ALFA 3000 [IU]/ML
30000 SOLUTION INTRAVENOUS; SUBCUTANEOUS ONCE
Refills: 0 | Status: COMPLETED | OUTPATIENT
Start: 2024-05-17 | End: 2024-05-17

## 2024-05-17 RX ADMIN — EPOETIN ALFA 30000 UNIT(S): 3000 SOLUTION INTRAVENOUS; SUBCUTANEOUS at 15:05

## 2024-05-17 RX ADMIN — IRON SUCROSE 100 MILLIGRAM(S): 20 INJECTION, SOLUTION INTRAVENOUS at 14:10

## 2024-05-17 RX ADMIN — IRON SUCROSE 200 MILLIGRAM(S): 20 INJECTION, SOLUTION INTRAVENOUS at 14:43

## 2024-05-22 NOTE — DISCHARGE NOTE NURSING/CASE MANAGEMENT/SOCIAL WORK - NURSING SECTION COMPLETE
Patient/Caregiver provided printed discharge information.
The patient is a 44y Female complaining of

## 2024-05-24 ENCOUNTER — OUTPATIENT (OUTPATIENT)
Dept: OUTPATIENT SERVICES | Facility: HOSPITAL | Age: 79
LOS: 1 days | End: 2024-05-24
Payer: MEDICARE

## 2024-05-24 ENCOUNTER — APPOINTMENT (OUTPATIENT)
Age: 79
End: 2024-05-24

## 2024-05-24 VITALS — DIASTOLIC BLOOD PRESSURE: 57 MMHG | SYSTOLIC BLOOD PRESSURE: 123 MMHG | HEART RATE: 68 BPM | TEMPERATURE: 97 F

## 2024-05-24 DIAGNOSIS — Z87.19 PERSONAL HISTORY OF OTHER DISEASES OF THE DIGESTIVE SYSTEM: Chronic | ICD-10-CM

## 2024-05-24 DIAGNOSIS — E61.1 IRON DEFICIENCY: ICD-10-CM

## 2024-05-24 DIAGNOSIS — Z98.890 OTHER SPECIFIED POSTPROCEDURAL STATES: Chronic | ICD-10-CM

## 2024-05-24 DIAGNOSIS — Z95.828 PRESENCE OF OTHER VASCULAR IMPLANTS AND GRAFTS: Chronic | ICD-10-CM

## 2024-05-24 PROCEDURE — 96365 THER/PROPH/DIAG IV INF INIT: CPT

## 2024-05-24 PROCEDURE — 96372 THER/PROPH/DIAG INJ SC/IM: CPT

## 2024-05-24 RX ORDER — EPOETIN ALFA 3000 [IU]/ML
30000 SOLUTION INTRAVENOUS; SUBCUTANEOUS ONCE
Refills: 0 | Status: COMPLETED | OUTPATIENT
Start: 2024-05-24 | End: 2024-05-24

## 2024-05-24 RX ORDER — IRON SUCROSE 20 MG/ML
200 INJECTION, SOLUTION INTRAVENOUS ONCE
Refills: 0 | Status: COMPLETED | OUTPATIENT
Start: 2024-05-24 | End: 2024-05-24

## 2024-05-24 RX ADMIN — EPOETIN ALFA 30000 UNIT(S): 3000 SOLUTION INTRAVENOUS; SUBCUTANEOUS at 13:37

## 2024-05-24 RX ADMIN — IRON SUCROSE 200 MILLIGRAM(S): 20 INJECTION, SOLUTION INTRAVENOUS at 14:07

## 2024-05-24 RX ADMIN — IRON SUCROSE 220 MILLIGRAM(S): 20 INJECTION, SOLUTION INTRAVENOUS at 13:37

## 2024-05-31 ENCOUNTER — APPOINTMENT (OUTPATIENT)
Age: 79
End: 2024-05-31

## 2024-06-07 ENCOUNTER — APPOINTMENT (OUTPATIENT)
Age: 79
End: 2024-06-07

## 2024-06-14 ENCOUNTER — OUTPATIENT (OUTPATIENT)
Dept: OUTPATIENT SERVICES | Facility: HOSPITAL | Age: 79
LOS: 1 days | End: 2024-06-14
Payer: MEDICARE

## 2024-06-14 ENCOUNTER — APPOINTMENT (OUTPATIENT)
Age: 79
End: 2024-06-14

## 2024-06-14 VITALS — TEMPERATURE: 98 F | DIASTOLIC BLOOD PRESSURE: 63 MMHG | HEART RATE: 81 BPM | SYSTOLIC BLOOD PRESSURE: 115 MMHG

## 2024-06-14 DIAGNOSIS — E61.1 IRON DEFICIENCY: ICD-10-CM

## 2024-06-14 DIAGNOSIS — Z98.890 OTHER SPECIFIED POSTPROCEDURAL STATES: Chronic | ICD-10-CM

## 2024-06-14 DIAGNOSIS — Z90.49 ACQUIRED ABSENCE OF OTHER SPECIFIED PARTS OF DIGESTIVE TRACT: Chronic | ICD-10-CM

## 2024-06-14 DIAGNOSIS — Z98.891 HISTORY OF UTERINE SCAR FROM PREVIOUS SURGERY: Chronic | ICD-10-CM

## 2024-06-14 DIAGNOSIS — Z95.828 PRESENCE OF OTHER VASCULAR IMPLANTS AND GRAFTS: Chronic | ICD-10-CM

## 2024-06-14 DIAGNOSIS — Z87.19 PERSONAL HISTORY OF OTHER DISEASES OF THE DIGESTIVE SYSTEM: Chronic | ICD-10-CM

## 2024-06-14 PROCEDURE — 96365 THER/PROPH/DIAG IV INF INIT: CPT

## 2024-06-14 PROCEDURE — 96372 THER/PROPH/DIAG INJ SC/IM: CPT

## 2024-06-14 RX ORDER — IRON SUCROSE 20 MG/ML
200 INJECTION, SOLUTION INTRAVENOUS ONCE
Refills: 0 | Status: COMPLETED | OUTPATIENT
Start: 2024-06-14 | End: 2024-06-14

## 2024-06-14 RX ORDER — EPOETIN ALFA 3000 [IU]/ML
30000 SOLUTION INTRAVENOUS; SUBCUTANEOUS ONCE
Refills: 0 | Status: COMPLETED | OUTPATIENT
Start: 2024-06-14 | End: 2024-06-14

## 2024-06-14 RX ADMIN — EPOETIN ALFA 30000 UNIT(S): 3000 SOLUTION INTRAVENOUS; SUBCUTANEOUS at 13:20

## 2024-06-14 RX ADMIN — IRON SUCROSE 200 MILLIGRAM(S): 20 INJECTION, SOLUTION INTRAVENOUS at 13:50

## 2024-06-14 RX ADMIN — IRON SUCROSE 200 MILLIGRAM(S): 20 INJECTION, SOLUTION INTRAVENOUS at 13:20

## 2024-06-15 DIAGNOSIS — E61.1 IRON DEFICIENCY: ICD-10-CM

## 2024-06-21 ENCOUNTER — APPOINTMENT (OUTPATIENT)
Age: 79
End: 2024-06-21

## 2024-06-28 ENCOUNTER — APPOINTMENT (OUTPATIENT)
Age: 79
End: 2024-06-28

## 2024-07-05 ENCOUNTER — APPOINTMENT (OUTPATIENT)
Age: 79
End: 2024-07-05

## 2024-07-10 ENCOUNTER — NON-APPOINTMENT (OUTPATIENT)
Age: 79
End: 2024-07-10

## 2024-07-12 ENCOUNTER — APPOINTMENT (OUTPATIENT)
Age: 79
End: 2024-07-12
Payer: MEDICARE

## 2024-07-12 DIAGNOSIS — D64.9 ANEMIA, UNSPECIFIED: ICD-10-CM

## 2024-07-12 PROCEDURE — 99213 OFFICE O/P EST LOW 20 MIN: CPT

## 2024-07-19 ENCOUNTER — APPOINTMENT (OUTPATIENT)
Age: 79
End: 2024-07-19

## 2024-07-22 ENCOUNTER — RESULT REVIEW (OUTPATIENT)
Age: 79
End: 2024-07-22

## 2024-07-22 ENCOUNTER — TRANSCRIPTION ENCOUNTER (OUTPATIENT)
Age: 79
End: 2024-07-22

## 2024-07-23 ENCOUNTER — TRANSCRIPTION ENCOUNTER (OUTPATIENT)
Age: 79
End: 2024-07-23

## 2024-07-26 ENCOUNTER — APPOINTMENT (OUTPATIENT)
Age: 79
End: 2024-07-26

## 2024-07-28 NOTE — H&P ADULT - ATTENDING COMMENTS
raymundo
78-year-old female history of anemia 2/2 CKD and chronic upper GIB due to AV malformations and gastric body dieulafoy lesion, severe aortic stenosis pending TAVR, presenting to ER for evaluation.    #Acute on Chronic Anemia  #Hx of AV malformations, gastric body dieulafoy lesions  #Severe AS  Hg 4.3 on admission  Cardio eval and risk stratification appreciated  - Ordered for 4U PRBC transfusion today to keep hg >8  - IV lasix 40mg once today, cont home lasix 40mg qD  - GI planning on EGD today, currently NPO  - Cont Metoprolol 25 BID    #CKD stage 4  - creat at baseline 2.5    #Hx of diverticulosis  - Avoid constipation    DVT PPX, SCD    #Progress Note Handoff  Pending (specify): EGD  Family discussion: tito pt regarding pending procedure  Disposition: Home

## 2024-07-29 ENCOUNTER — INPATIENT (INPATIENT)
Facility: HOSPITAL | Age: 79
LOS: 29 days | Discharge: HOME | DRG: 594 | End: 2024-08-28
Attending: INTERNAL MEDICINE | Admitting: INTERNAL MEDICINE
Payer: MEDICARE

## 2024-07-29 VITALS
TEMPERATURE: 99 F | SYSTOLIC BLOOD PRESSURE: 112 MMHG | RESPIRATION RATE: 18 BRPM | HEART RATE: 59 BPM | DIASTOLIC BLOOD PRESSURE: 46 MMHG | OXYGEN SATURATION: 95 % | HEIGHT: 67 IN

## 2024-07-29 DIAGNOSIS — Z90.49 ACQUIRED ABSENCE OF OTHER SPECIFIED PARTS OF DIGESTIVE TRACT: Chronic | ICD-10-CM

## 2024-07-29 DIAGNOSIS — Z98.890 OTHER SPECIFIED POSTPROCEDURAL STATES: Chronic | ICD-10-CM

## 2024-07-29 DIAGNOSIS — Z95.828 PRESENCE OF OTHER VASCULAR IMPLANTS AND GRAFTS: Chronic | ICD-10-CM

## 2024-07-29 DIAGNOSIS — Z87.19 PERSONAL HISTORY OF OTHER DISEASES OF THE DIGESTIVE SYSTEM: Chronic | ICD-10-CM

## 2024-07-29 DIAGNOSIS — Z98.891 HISTORY OF UTERINE SCAR FROM PREVIOUS SURGERY: Chronic | ICD-10-CM

## 2024-07-29 DIAGNOSIS — L89.90 PRESSURE ULCER OF UNSPECIFIED SITE, UNSPECIFIED STAGE: ICD-10-CM

## 2024-07-29 LAB
ALBUMIN SERPL ELPH-MCNC: 3.4 G/DL — LOW (ref 3.5–5.2)
ALP SERPL-CCNC: 83 U/L — SIGNIFICANT CHANGE UP (ref 30–115)
ALT FLD-CCNC: 33 U/L — SIGNIFICANT CHANGE UP (ref 0–41)
ANION GAP SERPL CALC-SCNC: 13 MMOL/L — SIGNIFICANT CHANGE UP (ref 7–14)
AST SERPL-CCNC: 107 U/L — HIGH (ref 0–41)
BASOPHILS # BLD AUTO: 0.02 K/UL — SIGNIFICANT CHANGE UP (ref 0–0.2)
BASOPHILS NFR BLD AUTO: 0.2 % — SIGNIFICANT CHANGE UP (ref 0–1)
BILIRUB SERPL-MCNC: 1.8 MG/DL — HIGH (ref 0.2–1.2)
BUN SERPL-MCNC: 99 MG/DL — CRITICAL HIGH (ref 10–20)
CALCIUM SERPL-MCNC: 8.3 MG/DL — LOW (ref 8.4–10.5)
CHLORIDE SERPL-SCNC: 108 MMOL/L — SIGNIFICANT CHANGE UP (ref 98–110)
CO2 SERPL-SCNC: 22 MMOL/L — SIGNIFICANT CHANGE UP (ref 17–32)
CREAT SERPL-MCNC: 3.5 MG/DL — HIGH (ref 0.7–1.5)
EGFR: 13 ML/MIN/1.73M2 — LOW
EOSINOPHIL # BLD AUTO: 0.65 K/UL — SIGNIFICANT CHANGE UP (ref 0–0.7)
EOSINOPHIL NFR BLD AUTO: 5.6 % — SIGNIFICANT CHANGE UP (ref 0–8)
GLUCOSE SERPL-MCNC: 85 MG/DL — SIGNIFICANT CHANGE UP (ref 70–99)
HCT VFR BLD CALC: 29.9 % — LOW (ref 37–47)
HGB BLD-MCNC: 9.1 G/DL — LOW (ref 12–16)
IMM GRANULOCYTES NFR BLD AUTO: 0.6 % — HIGH (ref 0.1–0.3)
LYMPHOCYTES # BLD AUTO: 0.74 K/UL — LOW (ref 1.2–3.4)
LYMPHOCYTES # BLD AUTO: 6.4 % — LOW (ref 20.5–51.1)
MCHC RBC-ENTMCNC: 29.1 PG — SIGNIFICANT CHANGE UP (ref 27–31)
MCHC RBC-ENTMCNC: 30.4 G/DL — LOW (ref 32–37)
MCV RBC AUTO: 95.5 FL — SIGNIFICANT CHANGE UP (ref 81–99)
MONOCYTES # BLD AUTO: 0.93 K/UL — HIGH (ref 0.1–0.6)
MONOCYTES NFR BLD AUTO: 8.1 % — SIGNIFICANT CHANGE UP (ref 1.7–9.3)
NEUTROPHILS # BLD AUTO: 9.1 K/UL — HIGH (ref 1.4–6.5)
NEUTROPHILS NFR BLD AUTO: 79.1 % — HIGH (ref 42.2–75.2)
NRBC # BLD: 0 /100 WBCS — SIGNIFICANT CHANGE UP (ref 0–0)
PLATELET # BLD AUTO: 156 K/UL — SIGNIFICANT CHANGE UP (ref 130–400)
PMV BLD: 11.1 FL — HIGH (ref 7.4–10.4)
POTASSIUM SERPL-MCNC: 4.6 MMOL/L — SIGNIFICANT CHANGE UP (ref 3.5–5)
POTASSIUM SERPL-SCNC: 4.6 MMOL/L — SIGNIFICANT CHANGE UP (ref 3.5–5)
PROT SERPL-MCNC: 5.2 G/DL — LOW (ref 6–8)
RBC # BLD: 3.13 M/UL — LOW (ref 4.2–5.4)
RBC # FLD: 18.3 % — HIGH (ref 11.5–14.5)
SODIUM SERPL-SCNC: 143 MMOL/L — SIGNIFICANT CHANGE UP (ref 135–146)
WBC # BLD: 11.51 K/UL — HIGH (ref 4.8–10.8)
WBC # FLD AUTO: 11.51 K/UL — HIGH (ref 4.8–10.8)

## 2024-07-29 PROCEDURE — 87086 URINE CULTURE/COLONY COUNT: CPT

## 2024-07-29 PROCEDURE — 82746 ASSAY OF FOLIC ACID SERUM: CPT

## 2024-07-29 PROCEDURE — 87340 HEPATITIS B SURFACE AG IA: CPT

## 2024-07-29 PROCEDURE — 85027 COMPLETE CBC AUTOMATED: CPT

## 2024-07-29 PROCEDURE — 80048 BASIC METABOLIC PNL TOTAL CA: CPT

## 2024-07-29 PROCEDURE — 86850 RBC ANTIBODY SCREEN: CPT

## 2024-07-29 PROCEDURE — 87184 SC STD DISK METHOD PER PLATE: CPT

## 2024-07-29 PROCEDURE — 86900 BLOOD TYPING SEROLOGIC ABO: CPT

## 2024-07-29 PROCEDURE — 82728 ASSAY OF FERRITIN: CPT

## 2024-07-29 PROCEDURE — 87641 MR-STAPH DNA AMP PROBE: CPT

## 2024-07-29 PROCEDURE — 87102 FUNGUS ISOLATION CULTURE: CPT

## 2024-07-29 PROCEDURE — 82607 VITAMIN B-12: CPT

## 2024-07-29 PROCEDURE — 84540 ASSAY OF URINE/UREA-N: CPT

## 2024-07-29 PROCEDURE — 86706 HEP B SURFACE ANTIBODY: CPT

## 2024-07-29 PROCEDURE — 71045 X-RAY EXAM CHEST 1 VIEW: CPT

## 2024-07-29 PROCEDURE — 74176 CT ABD & PELVIS W/O CONTRAST: CPT | Mod: MC

## 2024-07-29 PROCEDURE — 87186 SC STD MICRODIL/AGAR DIL: CPT

## 2024-07-29 PROCEDURE — 84300 ASSAY OF URINE SODIUM: CPT

## 2024-07-29 PROCEDURE — 70496 CT ANGIOGRAPHY HEAD: CPT | Mod: MC

## 2024-07-29 PROCEDURE — 86902 BLOOD TYPE ANTIGEN DONOR EA: CPT

## 2024-07-29 PROCEDURE — 83935 ASSAY OF URINE OSMOLALITY: CPT

## 2024-07-29 PROCEDURE — 87181 SC STD AGAR DILUTION PER AGT: CPT

## 2024-07-29 PROCEDURE — 87077 CULTURE AEROBIC IDENTIFY: CPT

## 2024-07-29 PROCEDURE — 93306 TTE W/DOPPLER COMPLETE: CPT

## 2024-07-29 PROCEDURE — 84156 ASSAY OF PROTEIN URINE: CPT

## 2024-07-29 PROCEDURE — 87040 BLOOD CULTURE FOR BACTERIA: CPT

## 2024-07-29 PROCEDURE — 84443 ASSAY THYROID STIM HORMONE: CPT

## 2024-07-29 PROCEDURE — 97110 THERAPEUTIC EXERCISES: CPT | Mod: GP

## 2024-07-29 PROCEDURE — 85730 THROMBOPLASTIN TIME PARTIAL: CPT

## 2024-07-29 PROCEDURE — 92526 ORAL FUNCTION THERAPY: CPT | Mod: GN

## 2024-07-29 PROCEDURE — 76770 US EXAM ABDO BACK WALL COMP: CPT

## 2024-07-29 PROCEDURE — 70498 CT ANGIOGRAPHY NECK: CPT | Mod: MC

## 2024-07-29 PROCEDURE — 97162 PT EVAL MOD COMPLEX 30 MIN: CPT | Mod: GP

## 2024-07-29 PROCEDURE — 82570 ASSAY OF URINE CREATININE: CPT

## 2024-07-29 PROCEDURE — 85610 PROTHROMBIN TIME: CPT

## 2024-07-29 PROCEDURE — 93005 ELECTROCARDIOGRAM TRACING: CPT

## 2024-07-29 PROCEDURE — 85025 COMPLETE CBC W/AUTO DIFF WBC: CPT

## 2024-07-29 PROCEDURE — 99285 EMERGENCY DEPT VISIT HI MDM: CPT

## 2024-07-29 PROCEDURE — 70450 CT HEAD/BRAIN W/O DYE: CPT | Mod: MC

## 2024-07-29 PROCEDURE — 82553 CREATINE MB FRACTION: CPT

## 2024-07-29 PROCEDURE — 81001 URINALYSIS AUTO W/SCOPE: CPT

## 2024-07-29 PROCEDURE — P9040: CPT

## 2024-07-29 PROCEDURE — 86901 BLOOD TYPING SEROLOGIC RH(D): CPT

## 2024-07-29 PROCEDURE — 83550 IRON BINDING TEST: CPT

## 2024-07-29 PROCEDURE — 82140 ASSAY OF AMMONIA: CPT

## 2024-07-29 PROCEDURE — 86704 HEP B CORE ANTIBODY TOTAL: CPT

## 2024-07-29 PROCEDURE — 93970 EXTREMITY STUDY: CPT

## 2024-07-29 PROCEDURE — 82550 ASSAY OF CK (CPK): CPT

## 2024-07-29 PROCEDURE — 36415 COLL VENOUS BLD VENIPUNCTURE: CPT

## 2024-07-29 PROCEDURE — 84484 ASSAY OF TROPONIN QUANT: CPT

## 2024-07-29 PROCEDURE — 36430 TRANSFUSION BLD/BLD COMPNT: CPT

## 2024-07-29 PROCEDURE — 92610 EVALUATE SWALLOWING FUNCTION: CPT | Mod: GN

## 2024-07-29 PROCEDURE — 86705 HEP B CORE ANTIBODY IGM: CPT

## 2024-07-29 PROCEDURE — 83540 ASSAY OF IRON: CPT

## 2024-07-29 PROCEDURE — 95819 EEG AWAKE AND ASLEEP: CPT

## 2024-07-29 PROCEDURE — 87640 STAPH A DNA AMP PROBE: CPT

## 2024-07-29 PROCEDURE — 0042T: CPT | Mod: MC

## 2024-07-29 PROCEDURE — 84100 ASSAY OF PHOSPHORUS: CPT

## 2024-07-29 PROCEDURE — 86922 COMPATIBILITY TEST ANTIGLOB: CPT

## 2024-07-29 PROCEDURE — 74018 RADEX ABDOMEN 1 VIEW: CPT

## 2024-07-29 PROCEDURE — 84133 ASSAY OF URINE POTASSIUM: CPT

## 2024-07-29 PROCEDURE — 90935 HEMODIALYSIS ONE EVALUATION: CPT

## 2024-07-29 PROCEDURE — 87070 CULTURE OTHR SPECIMN AEROBIC: CPT

## 2024-07-29 PROCEDURE — 82803 BLOOD GASES ANY COMBINATION: CPT

## 2024-07-29 PROCEDURE — 87075 CULTR BACTERIA EXCEPT BLOOD: CPT

## 2024-07-29 PROCEDURE — 73610 X-RAY EXAM OF ANKLE: CPT | Mod: RT

## 2024-07-29 PROCEDURE — 87206 SMEAR FLUORESCENT/ACID STAI: CPT

## 2024-07-29 PROCEDURE — 83735 ASSAY OF MAGNESIUM: CPT

## 2024-07-29 PROCEDURE — 82962 GLUCOSE BLOOD TEST: CPT

## 2024-07-29 PROCEDURE — 80053 COMPREHEN METABOLIC PANEL: CPT

## 2024-07-29 PROCEDURE — 83615 LACTATE (LD) (LDH) ENZYME: CPT

## 2024-07-29 PROCEDURE — 83605 ASSAY OF LACTIC ACID: CPT

## 2024-07-29 PROCEDURE — 97530 THERAPEUTIC ACTIVITIES: CPT | Mod: GP

## 2024-07-29 PROCEDURE — 87116 MYCOBACTERIA CULTURE: CPT

## 2024-07-29 PROCEDURE — P9016: CPT

## 2024-07-29 RX ORDER — SODIUM CHLORIDE 9 MG/ML
1000 INJECTION INTRAMUSCULAR; INTRAVENOUS; SUBCUTANEOUS ONCE
Refills: 0 | Status: COMPLETED | OUTPATIENT
Start: 2024-07-29 | End: 2024-07-29

## 2024-07-29 RX ORDER — CLINDAMYCIN PHOSPHATE 150 MG/ML
450 VIAL (ML) INJECTION ONCE
Refills: 0 | Status: COMPLETED | OUTPATIENT
Start: 2024-07-29 | End: 2024-07-29

## 2024-07-29 RX ADMIN — SODIUM CHLORIDE 333.33 MILLILITER(S): 9 INJECTION INTRAMUSCULAR; INTRAVENOUS; SUBCUTANEOUS at 23:55

## 2024-07-29 RX ADMIN — Medication 450 MILLIGRAM(S): at 21:23

## 2024-07-29 NOTE — ED PROVIDER NOTE - CLINICAL SUMMARY MEDICAL DECISION MAKING FREE TEXT BOX
78-year-old female past medical history of [place], gout, HTN, aortic stenosis pending TAVR, HTN, CKD, eczema, anemia, recent admission for upper GI bleed (found to have an AV malformation during her endoscopy), presents for a rupture pressure ulcer of the buttock area today.  Patient states she was on her toilet which cracked while she was sitting on it, and patient was unable to get off the toilet for 4 hours.  By the time EMS arrived skin had ulcerated, with a blister that ruptured predominantly to the left buttock area (appears to be like burn blister that ruptured) with linear abrasions to the bilateral upper thighs/buttocks.  Patient in so much pain that she cannot stand or walk, and arrived to the ER covered in feces and urine. No change of meds or history since dc from hospital July 23    [facility] agree with pa exam and management. Will need wound care, Wound care consult. Check labs and likely admit for placement as pt can not care for her own ADLs.

## 2024-07-29 NOTE — ED PROVIDER NOTE - CARE PLAN
1 Principal Discharge DX:	Pressure ulcer   Principal Discharge DX:	Pressure ulcer  Secondary Diagnosis:	Renal failure, acute on chronic

## 2024-07-29 NOTE — ED PROVIDER NOTE - PHYSICAL EXAMINATION
VITAL SIGNS: I have reviewed nursing notes and confirm.  CONSTITUTIONAL: Well-developed; well-nourished; in no acute distress.  SKIN: Skin exam is warm and dry  HEAD: Normocephalic; atraumatic.  EYES: PERRL, EOM intact; conjunctiva and sclera clear.  ENT: No nasal discharge; airway clear. TMs clear.  NECK: Supple; non tender.  CARD: S1, S2 normal; no murmurs, gallops, or rubs. Regular rate and rhythm.  RESP: Normal respiratory effort, no tachypnea or distress. Lungs CTAB, no wheezes, rales or rhonchi.  EXT: limited ROM d/t pain. Pt has bilateral grade 2 pressure ulcers on posterior thighs measuring ~6cm length   NEURO: Alert, oriented. Grossly unremarkable. No focal deficits.  PSYCH: Cooperative, appropriate. VITAL SIGNS: I have reviewed nursing notes and confirm.  CONSTITUTIONAL: Well-developed; well-nourished; in no acute distress.  SKIN: Skin exam is warm and dry  HEAD: Normocephalic; atraumatic.  CARD: S1, S2 normal; no murmurs, gallops, or rubs. Regular rate and rhythm.  RESP: Normal respiratory effort, no tachypnea or distress. Lungs CTAB, no wheezes, rales or rhonchi.  EXT: limited ROM d/t pain. Pt has bilateral grade 2 pressure ulcers on posterior thighs measuring ~6cm length   NEURO: Alert, oriented. Grossly unremarkable. No focal deficits.  PSYCH: Cooperative, appropriate. VITAL SIGNS: I have reviewed nursing notes and confirm.  CONSTITUTIONAL: Well-developed; well-nourished; in no acute distress.  SKIN: Skin exam is warm and dry  HEAD: Normocephalic; atraumatic.  CARD: S1, S2 normal; no murmurs, gallops, or rubs. Regular rate and rhythm.  RESP: Normal respiratory effort, no tachypnea or distress. Lungs CTAB, no wheezes, rales or rhonchi.  EXT: limited ROM d/t pain. Pt has bilateral grade 2 pressure ulcers on posterior thighs measuring ~6-7cm length , +long linear abrasions to b/l upper thighs/buttocks  NEURO: Alert, oriented. Grossly unremarkable. No focal deficits.  PSYCH: Cooperative, appropriate.

## 2024-07-29 NOTE — ED PROVIDER NOTE - CADM POA CENTRAL LINE
Pt arrived via [facility] with SOB and not feeling well for the fast couple days. Pt denies use of oxygen at home. On 15L NRB on arrival O2 @95%. Pt endorses chills, fatigue and sob. Denies nausea, vomiting diarrhea.    No

## 2024-07-29 NOTE — ED PROVIDER NOTE - OBJECTIVE STATEMENT
Patient is a 78-year-old female with past medical history of chronic anemia aortic stenosis CKD eczema and gout presenting today with bilateral leg pain.  Patient states that last night she was having a bowel movement when the plastic toilet covers slipped and she was unable to get up for 4 hours.  Patient states she is in extreme pain bilaterally behind her thighs, and that she feels weak in the legs.  She denies any head trauma or loss of consciousness or lightheadedness.  Patient was recently admitted to the hospital between 7/13-7/23 for hematochezia. Pt is [facility]

## 2024-07-30 LAB
ALBUMIN SERPL ELPH-MCNC: 2.8 G/DL — LOW (ref 3.5–5.2)
ALP SERPL-CCNC: 70 U/L — SIGNIFICANT CHANGE UP (ref 30–115)
ALT FLD-CCNC: 27 U/L — SIGNIFICANT CHANGE UP (ref 0–41)
ANION GAP SERPL CALC-SCNC: 12 MMOL/L — SIGNIFICANT CHANGE UP (ref 7–14)
APPEARANCE UR: ABNORMAL
AST SERPL-CCNC: 64 U/L — HIGH (ref 0–41)
BASOPHILS # BLD AUTO: 0.01 K/UL — SIGNIFICANT CHANGE UP (ref 0–0.2)
BASOPHILS NFR BLD AUTO: 0.2 % — SIGNIFICANT CHANGE UP (ref 0–1)
BILIRUB SERPL-MCNC: 0.7 MG/DL — SIGNIFICANT CHANGE UP (ref 0.2–1.2)
BILIRUB UR-MCNC: NEGATIVE — SIGNIFICANT CHANGE UP
BUN SERPL-MCNC: 101 MG/DL — CRITICAL HIGH (ref 10–20)
CALCIUM SERPL-MCNC: 7.4 MG/DL — LOW (ref 8.4–10.4)
CHLORIDE SERPL-SCNC: 107 MMOL/L — SIGNIFICANT CHANGE UP (ref 98–110)
CK SERPL-CCNC: 2766 U/L — HIGH (ref 0–225)
CK SERPL-CCNC: 934 U/L — HIGH (ref 0–225)
CO2 SERPL-SCNC: 20 MMOL/L — SIGNIFICANT CHANGE UP (ref 17–32)
COLOR SPEC: YELLOW — SIGNIFICANT CHANGE UP
CREAT SERPL-MCNC: 3.6 MG/DL — HIGH (ref 0.7–1.5)
DIFF PNL FLD: ABNORMAL
EGFR: 12 ML/MIN/1.73M2 — LOW
EOSINOPHIL # BLD AUTO: 0.49 K/UL — SIGNIFICANT CHANGE UP (ref 0–0.7)
EOSINOPHIL NFR BLD AUTO: 7.5 % — SIGNIFICANT CHANGE UP (ref 0–8)
GLUCOSE SERPL-MCNC: 143 MG/DL — HIGH (ref 70–99)
GLUCOSE UR QL: NEGATIVE MG/DL — SIGNIFICANT CHANGE UP
HCT VFR BLD CALC: 24.9 % — LOW (ref 37–47)
HGB BLD-MCNC: 7.6 G/DL — LOW (ref 12–16)
IMM GRANULOCYTES NFR BLD AUTO: 0.6 % — HIGH (ref 0.1–0.3)
KETONES UR-MCNC: NEGATIVE MG/DL — SIGNIFICANT CHANGE UP
LACTATE SERPL-SCNC: 1.6 MMOL/L — SIGNIFICANT CHANGE UP (ref 0.7–2)
LEUKOCYTE ESTERASE UR-ACNC: ABNORMAL
LYMPHOCYTES # BLD AUTO: 0.54 K/UL — LOW (ref 1.2–3.4)
LYMPHOCYTES # BLD AUTO: 8.3 % — LOW (ref 20.5–51.1)
MAGNESIUM SERPL-MCNC: 2.1 MG/DL — SIGNIFICANT CHANGE UP (ref 1.8–2.4)
MCHC RBC-ENTMCNC: 29.1 PG — SIGNIFICANT CHANGE UP (ref 27–31)
MCHC RBC-ENTMCNC: 30.5 G/DL — LOW (ref 32–37)
MCV RBC AUTO: 95.4 FL — SIGNIFICANT CHANGE UP (ref 81–99)
MONOCYTES # BLD AUTO: 0.65 K/UL — HIGH (ref 0.1–0.6)
MONOCYTES NFR BLD AUTO: 10 % — HIGH (ref 1.7–9.3)
NEUTROPHILS # BLD AUTO: 4.79 K/UL — SIGNIFICANT CHANGE UP (ref 1.4–6.5)
NEUTROPHILS NFR BLD AUTO: 73.4 % — SIGNIFICANT CHANGE UP (ref 42.2–75.2)
NITRITE UR-MCNC: NEGATIVE — SIGNIFICANT CHANGE UP
NRBC # BLD: 0 /100 WBCS — SIGNIFICANT CHANGE UP (ref 0–0)
PH UR: 8.5 (ref 5–8)
PLATELET # BLD AUTO: 119 K/UL — LOW (ref 130–400)
PMV BLD: 10.8 FL — HIGH (ref 7.4–10.4)
POTASSIUM SERPL-MCNC: 4.2 MMOL/L — SIGNIFICANT CHANGE UP (ref 3.5–5)
POTASSIUM SERPL-SCNC: 4.2 MMOL/L — SIGNIFICANT CHANGE UP (ref 3.5–5)
PROT SERPL-MCNC: 4.4 G/DL — LOW (ref 6–8)
PROT UR-MCNC: 100 MG/DL
RBC # BLD: 2.61 M/UL — LOW (ref 4.2–5.4)
RBC # FLD: 18.4 % — HIGH (ref 11.5–14.5)
SODIUM SERPL-SCNC: 139 MMOL/L — SIGNIFICANT CHANGE UP (ref 135–146)
SP GR SPEC: 1.02 — SIGNIFICANT CHANGE UP (ref 1–1.03)
UROBILINOGEN FLD QL: 0.2 MG/DL — SIGNIFICANT CHANGE UP (ref 0.2–1)
WBC # BLD: 6.52 K/UL — SIGNIFICANT CHANGE UP (ref 4.8–10.8)
WBC # FLD AUTO: 6.52 K/UL — SIGNIFICANT CHANGE UP (ref 4.8–10.8)

## 2024-07-30 PROCEDURE — 99222 1ST HOSP IP/OBS MODERATE 55: CPT | Mod: GC

## 2024-07-30 PROCEDURE — 76770 US EXAM ABDO BACK WALL COMP: CPT | Mod: 26

## 2024-07-30 RX ORDER — SODIUM CHLORIDE 9 MG/ML
1000 INJECTION INTRAMUSCULAR; INTRAVENOUS; SUBCUTANEOUS
Refills: 0 | Status: DISCONTINUED | OUTPATIENT
Start: 2024-07-30 | End: 2024-07-30

## 2024-07-30 RX ORDER — SILVER SULFADIAZINE 1 %
1 CREAM (GRAM) TOPICAL DAILY
Refills: 0 | Status: DISCONTINUED | OUTPATIENT
Start: 2024-07-30 | End: 2024-08-09

## 2024-07-30 RX ORDER — PANTOPRAZOLE SODIUM 40 MG
40 TABLET, DELAYED RELEASE (ENTERIC COATED) ORAL
Refills: 0 | Status: DISCONTINUED | OUTPATIENT
Start: 2024-07-30 | End: 2024-08-23

## 2024-07-30 RX ORDER — ACETAMINOPHEN 325 MG/1
650 TABLET ORAL EVERY 6 HOURS
Refills: 0 | Status: DISCONTINUED | OUTPATIENT
Start: 2024-07-30 | End: 2024-08-09

## 2024-07-30 RX ORDER — FUROSEMIDE 40 MG
40 TABLET ORAL ONCE
Refills: 0 | Status: DISCONTINUED | OUTPATIENT
Start: 2024-07-30 | End: 2024-07-30

## 2024-07-30 RX ORDER — FUROSEMIDE 40 MG
40 TABLET ORAL DAILY
Refills: 0 | Status: DISCONTINUED | OUTPATIENT
Start: 2024-07-30 | End: 2024-07-30

## 2024-07-30 RX ORDER — METOPROLOL TARTRATE 100 MG/1
25 TABLET ORAL DAILY
Refills: 0 | Status: DISCONTINUED | OUTPATIENT
Start: 2024-07-30 | End: 2024-08-09

## 2024-07-30 RX ADMIN — Medication 1 APPLICATION(S): at 16:50

## 2024-07-30 RX ADMIN — SODIUM CHLORIDE 70 MILLILITER(S): 9 INJECTION INTRAMUSCULAR; INTRAVENOUS; SUBCUTANEOUS at 09:19

## 2024-07-30 RX ADMIN — Medication 100 MILLIGRAM(S): at 16:48

## 2024-07-30 RX ADMIN — Medication 40 MILLIGRAM(S): at 06:01

## 2024-07-30 RX ADMIN — Medication 70 MILLILITER(S): at 06:02

## 2024-07-30 RX ADMIN — Medication 40 MILLIGRAM(S): at 16:47

## 2024-07-30 RX ADMIN — METOPROLOL TARTRATE 25 MILLIGRAM(S): 100 TABLET ORAL at 06:01

## 2024-07-30 NOTE — PHYSICAL THERAPY INITIAL EVALUATION ADULT - DID THE PATIENT HAVE SURGERY?
As per pt, she is independent at home, lives with her son, did not use any Assistive Device prior to adamission. PT plan of care discussed, will f/u once pt agreeable and can tolerate activity.

## 2024-07-30 NOTE — CONSULT NOTE ADULT - ASSESSMENT
IMPRESSION: Rehab of gait dysfunction / rupture pressure ulcer of the buttock /  CKD, HTN, CCY, gout,  severe AS being planned for tAVR, and history of severe anemia secondary to chronic upper GI bleeding due to  AV malformation gastric body dieulafoy lesion, weekly blood transfusions    PRECAUTIONS: [   ] Cardiac  [   ] Respiratory  [   ] Seizures [   ] Contact Isolation  [   ] Droplet Isolation  [   ] Other    Weight Bearing Status:     RECOMMENDATION:    Out of Bed to Chair     DVT/Decubiti Prophylaxis    REHAB PLAN:     [ x   ] Bedside P/T 3-5 times a week   [    ]   Bedside O/T  2-3 times a week             [    ] Speech Therapy               [    ]  No Rehab Therapy Indicated   Conditioning/ROM                                    ADL  Bed Mobility                                               Conditioning/ROM  Transfers                                                     Bed Mobility  Sitting /Standing Balance                         Transfers                                        Gait Training                                               Sitting/Standing Balance  Stair Training [   ]Applicable                    Home equipment Eval                                                                        Splinting  [   ] Only      GOALS:   ADL   [    ]   Independent                    Transfers  [ x   ] Independent                          Ambulation  [  x  ] Independent     [  x   ] With device                            [    ]  CG                                                         [    ]  CG                                                                  [    ] CG                            [    ] Min A                                                   [    ] Min A                                                              [    ] Min  A          DISCHARGE PLAN:   [    ]  Good candidate for Intensive Rehabilitation/Hospital based                                             Will tolerate 3hrs Intensive Rehab Daily                                       [  x   ]  Short Term Rehab in Skilled Nursing Facility                            vs           [  x   ]  Home with Outpatient or  services                                         [     ]  Possible Candidate for Intensive Hospital based Rehab

## 2024-07-30 NOTE — H&P ADULT - NSHPLABSRESULTS_GEN_ALL_CORE
.  LABS:                         9.1    11.51 )-----------( 156      ( 29 Jul 2024 22:25 )             29.9     07-29    143  |  108  |  99<HH>  ----------------------------<  85  4.6   |  22  |  3.5<H>    Ca    8.3<L>      29 Jul 2024 22:25    TPro  5.2<L>  /  Alb  3.4<L>  /  TBili  1.8<H>  /  DBili  x   /  AST  107<H>  /  ALT  33  /  AlkPhos  83  07-29      Urinalysis Basic - ( 29 Jul 2024 22:25 )    Color: x / Appearance: x / SG: x / pH: x  Gluc: 85 mg/dL / Ketone: x  / Bili: x / Urobili: x   Blood: x / Protein: x / Nitrite: x   Leuk Esterase: x / RBC: x / WBC x   Sq Epi: x / Non Sq Epi: x / Bacteria: x        Lactate, Blood: 1.6 mmol/L (07-30 @ 01:09)      RADIOLOGY, EKG & ADDITIONAL TESTS: Reviewed.

## 2024-07-30 NOTE — H&P ADULT - NSHPPHYSICALEXAM_GEN_ALL_CORE
T(C): 36.4 (07-30-24 @ 00:51), Max: 37.1 (07-29-24 @ 18:30)  HR: 65 (07-30-24 @ 00:51) (59 - 65)  BP: 146/69 (07-30-24 @ 00:51) (112/46 - 146/69)  RR: 18 (07-30-24 @ 00:51) (18 - 18)  SpO2: 99% (07-30-24 @ 00:51) (95% - 99%)    CONSTITUTIONAL: NAD  RESP: No respiratory distress, no use of accessory muscles; CTA b/l, no WRR  CV: RRR, +S1S2, +2 peripheral edema   GI: Soft, NT, ND  SKIN: severe erythema and pressure injury buttocks   NEURO:  A+O x 3

## 2024-07-30 NOTE — ED ADULT NURSE NOTE - OBJECTIVE STATEMENT
pt is a 78 yr olf F presented to the ER c/o weakness and a wound to the buttocks. Pt stated she was using the bathroom, when sitting on the toilet bowl, it broke and she was stuck on the floor for 4 hours. Pt now has a severe skin tare to the rim of her buttocks and b/l posterior thighs.

## 2024-07-30 NOTE — PHYSICAL THERAPY INITIAL EVALUATION ADULT - SPECIFY REASON(S)
1330 pm PT attempted to see pt for evaluation; however, pt c/o intense pain on left gluteal area ( ruptured blister / wound area).

## 2024-07-30 NOTE — H&P ADULT - HISTORY OF PRESENT ILLNESS
HPI : Patient is a 78-year-old female with past medical history of   CKD, HTN, CCY, gout,  severe AS being planned for tAVR, and history of severe anemia secondary to chronic upper GI bleeding due to  AV malformation gastric body dieulafoy lesion, weekly blood transfusions presenting to ER for a rupture pressure ulcer of the buttock area today.   Patient states that last night was on her toilet which cracked while she was sitting on it, and patient was unable to get off the toilet for 4 hours.  By the time EMS arrived skin had ulcerated, with a blister that ruptured predominantly to the left buttock area (appears to be like burn blister that ruptured) with linear abrasions to the bilateral upper thighs/buttocks.  Patient in so much pain that she cannot stand or walk, Of note, pt was recently admitted to the hospital between 7/13-7/23 for hematochezia, s/p 1 unit prbc. Denies fever, chills. N/V, abd pain.     Admitted for wound management and placement as pt can not care for her own ADLs.    Vital Signs Last 24 Hrs  T(C): 36.4 (30 Jul 2024 00:51), Max: 37.1 (29 Jul 2024 18:30)  T(F): 97.6 (30 Jul 2024 00:51), Max: 98.7 (29 Jul 2024 18:30)  HR: 65 (30 Jul 2024 00:51) (59 - 65)  BP: 146/69 (30 Jul 2024 00:51) (112/46 - 146/69)  BP(mean): --  RR: 18 (30 Jul 2024 00:51) (18 - 18)  SpO2: 99% (30 Jul 2024 00:51) (95% - 99%)    Parameters below as of 30 Jul 2024 00:51  Patient On (Oxygen Delivery Method): room air    Labs significant for wbc 11k , hgb 9.1 , Cr 3.5 (baseline 2-3)  HPI : Patient is a 78-year-old female with past medical history of   CKD, HTN, CCY, gout,  severe AS being planned for tAVR, and history of severe anemia secondary to chronic upper GI bleeding due to  AV malformation gastric body dieulafoy lesion, weekly blood transfusions presenting to ER for a rupture pressure ulcer of the buttock area today.   Patient states that last night was on her toilet which cracked while she was sitting on it, and patient was unable to get off the toilet for 4 hours.  By the time EMS arrived skin had ulcerated, with a blister that ruptured predominantly to the left buttock area (appears to be like burn blister that ruptured) with linear abrasions to the bilateral upper thighs/buttocks.  Patient in so much pain that she cannot stand or walk, Of note, pt was recently admitted to the hospital between 7/13-7/23 for hematochezia, s/p 1 unit prbc. Denies fever, chills. N/V, abd pain.     Vital Signs Last 24 Hrs  T(C): 36.4 (30 Jul 2024 00:51), Max: 37.1 (29 Jul 2024 18:30)  T(F): 97.6 (30 Jul 2024 00:51), Max: 98.7 (29 Jul 2024 18:30)  HR: 65 (30 Jul 2024 00:51) (59 - 65)  BP: 146/69 (30 Jul 2024 00:51) (112/46 - 146/69)  BP(mean): --  RR: 18 (30 Jul 2024 00:51) (18 - 18)  SpO2: 99% (30 Jul 2024 00:51) (95% - 99%)    Parameters below as of 30 Jul 2024 00:51  Patient On (Oxygen Delivery Method): room air    Labs significant for wbc 11k , hgb 9.1 , Cr 3.5 (baseline 2-3)   s/p  1L NS in ED   Admitted for wound management and placement as pt can not care for her own ADLs.

## 2024-07-30 NOTE — H&P ADULT - ASSESSMENT
Patient is a 78-year-old female with past medical history of   CKD, HTN, CCY, gout,  severe AS being planned for tAVR, and history of severe anemia secondary to chronic upper GI bleeding due to  AV malformation gastric body dieulafoy lesion, weekly blood transfusions presenting to ER for a rupture pressure ulcer of the buttock area today. Admitted for wound management and placement as pt can not care for her own ADLs.      #      # History of AVMs (gastric and duodenal) and Dieulafoy Lesions Status Post Hemostasis in 2023  # Chronic normocytic DARRIN   # History of Perisplenic Varices in 2023   - on weekly IV transfusion/iron infusions per hematology  -s/p EGD/colonoscopy/push enteroscopy on 7/22/24 showing AVMs and polyps    -monitor Hgb, keep active T&S, Avoid Nsaids   -PPI QD  -outpt follow up with GI and hepatology     #EVAN on CKD stage 4   -likely prerenal  -Cr 3.5 (baseline 2-3)  -repeat BMP    #Acute diastolic heart failure   # Severe aortic stenosis being planned for TAVR  -TTE: 03/22/2023- EF 62%,  severe aortic stenosis, mildly increased left ventricular wall thickness, Grade II diastolic dysfunction of left ventricular myocardial filling, mild thickening of the anterior and posterior mitral valve leaflets, mild mitral valve regurgitation, mild tricuspid regurgitation  -outpt fu     #DVT ppx  -HSQ | Lovenox | Eliquis | Xarelto | Coumadin, daily INR | SCDs | hep gtt  -VA duplx    #GI ppx  -N/A | PPI | home PPI | H2 | PPI gtt    #Diet  -dash/tlc | CC | renal   -fluid restrict 800cc | 1000cc | 1200cc | 1500cc  -Easy to chew | Minced | puree | soft | thin liquid | thickened liquid  -SLP eval    #Activity-IAT   #Dispo-acute       Patient is a 78-year-old female with past medical history of   CKD, HTN, CCY, gout,  severe AS being planned for tAVR, and history of severe anemia secondary to chronic upper GI bleeding due to  AV malformation gastric body dieulafoy lesion, weekly blood transfusions presenting to ER for a rupture pressure ulcer of the buttock area today. Admitted for wound management and placement as pt can not care for her own ADLs.      #Pressure ulcer  -would care consult   -pain control  -may need placement to help with ADLs  -PT consult     # History of AVMs (gastric and duodenal) and Dieulafoy Lesions Status Post Hemostasis in 2023  # Chronic normocytic DARRIN   # History of Perisplenic Varices in 2023   - on weekly IV transfusion/iron infusions per hematology  -s/p EGD/colonoscopy/push enteroscopy on 7/22/24 showing AVMs and polyps    -monitor Hgb, keep active T&S, Avoid Nsaids   -PPI QD  -outpt follow up with GI and hepatology     #EVAN on CKD stage 4   -likely prerenal  -Cr 3.5 (baseline 2-3)  -repeat BMP    #Acute diastolic heart failure   # Severe aortic stenosis being planned for TAVR  -TTE: 03/22/2023- EF 62%,  severe aortic stenosis, mildly increased left ventricular wall thickness, Grade II diastolic dysfunction of left ventricular myocardial filling, mild thickening of the anterior and posterior mitral valve leaflets, mild mitral valve regurgitation, mild tricuspid regurgitation  -outpt fu     #DVT ppx  -HSQ | Lovenox | Eliquis | Xarelto | Coumadin, daily INR | SCDs | hep gtt  -VA duplx    #GI ppx  -N/A | PPI | home PPI | H2 | PPI gtt    #Diet  -dash/tlc | CC | renal   -fluid restrict 800cc | 1000cc | 1200cc | 1500cc  -Easy to chew | Minced | puree | soft | thin liquid | thickened liquid  -SLP eval    #Activity-IAT   #Dispo-acute       Patient is a 78-year-old female with past medical history of   CKD, HTN, CCY, gout,  severe AS being planned for tAVR, and history of severe anemia secondary to chronic upper GI bleeding due to  AV malformation gastric body dieulafoy lesion, weekly blood transfusions presenting to ER for a rupture pressure ulcer of the buttock area today. Admitted for wound management and placement as pt can not care for her own ADLs.      #Severe pressure injury /ulcer   - Patient states that last night was on her toilet which cracked while she was sitting on it, and patient was unable to get off the toilet for 4 hours  -Burn consult   -would care   -pain control  -may need placement to help with ADLs  -PT     #EVAN on CKD stage 4   -likely prerenal  -Cr 3.5 (baseline 2-3)  -hold Lasix  and fluids   -repeat BMP    # History of AVMs (gastric and duodenal) and Dieulafoy Lesions Status Post Hemostasis in 2023  # Chronic normocytic DARRIN   # History of Perisplenic Varices in 2023   - on weekly IV transfusion/iron infusions per hematology  -s/p EGD/colonoscopy/push enteroscopy on 7/22/24 showing AVMs and polyps    -monitor Hgb, keep active T&S, Avoid Nsaids   -PPI QD  -outpt follow up with GI and hepatology       #Acute diastolic heart failure   # Severe aortic stenosis being planned for TAVR  -TTE: 03/22/2023- EF 62%,  severe aortic stenosis, mildly increased left ventricular wall thickness, Grade II diastolic dysfunction of left ventricular myocardial filling, mild thickening of the anterior and posterior mitral valve leaflets, mild mitral valve regurgitation, mild tricuspid regurgitation  -outpt fu   -c/w Metoprolol  -Hold Lasix for now in setting of EVAN     #Gout  -completed 7 days of Prednisone 40mg     #DVT ppx- SCD given bleeding risk   #GI ppx-PPI  #Diet-DASH  #Activity-IAT   #Dispo-acute       Patient is a 78-year-old female with past medical history of   CKD, HTN, CCY, gout,  severe AS being planned for tAVR, and history of severe anemia secondary to chronic upper GI bleeding due to  AV malformation gastric body dieulafoy lesion, weekly blood transfusions presenting to ER for a rupture pressure ulcer of the buttock area today. Admitted for wound management and placement as pt can not care for her own ADLs.      #Severe pressure injury /ulcer   - Patient states that last night was on her toilet which cracked while she was sitting on it, and patient was unable to get off the toilet for 4 hours  -Burn consult   -would care   -pain control  -CK level   -may need placement to help with ADLs  -PT     #EVAN on CKD stage 4   -possible prerenal vs ATN  -UA  -RBUS  -Cr 3.5 (baseline 2-3)  -Fu CK level   -hold Lasix  and fluids   -repeat BMP    # History of AVMs (gastric and duodenal) and Dieulafoy Lesions Status Post Hemostasis in 2023  # Chronic normocytic DARRIN   # History of Perisplenic Varices in 2023   - on weekly IV transfusion/iron infusions per hematology  -s/p EGD/colonoscopy/push enteroscopy on 7/22/24 showing AVMs and polyps    -monitor Hgb, keep active T&S, Avoid Nsaids   -PPI QD  -outpt follow up with GI and hepatology       #Acute diastolic heart failure   # Severe aortic stenosis being planned for TAVR  -TTE: 03/22/2023- EF 62%,  severe aortic stenosis, mildly increased left ventricular wall thickness, Grade II diastolic dysfunction of left ventricular myocardial filling, mild thickening of the anterior and posterior mitral valve leaflets, mild mitral valve regurgitation, mild tricuspid regurgitation  -outpt fu   -c/w Metoprolol  -Hold Lasix for now in setting of EVAN     #Gout  -completed 7 days of Prednisone 40mg     #DVT ppx- SCD given bleeding risk   #GI ppx-PPI  #Diet-DASH  #Activity-IAT   #Dispo-acute       Patient is a 78-year-old female with past medical history of   CKD, HTN, CCY, gout,  severe AS being planned for tAVR, and history of severe anemia secondary to chronic upper GI bleeding due to  AV malformation gastric body dieulafoy lesion, weekly blood transfusions presenting to ER for a rupture pressure ulcer of the buttock area today. Admitted for wound management and placement as pt can not care for her own ADLs.      #Severe pressure injury /ulcer   - Patient states that last night was on her toilet which cracked while she was sitting on it, and patient was unable to get off the toilet for 4 hours  -Burn consult   -would care   -pain control  -CK level   -may need placement to help with ADLs  -PT     #EVAN on CKD stage 4   -possible prerenal vs ATN  -UA  -RBUS  -Cr 3.5 (baseline 2-3)  -Fu CK level   -hold Lasix  and fluids   -repeat BMP    # History of AVMs (gastric and duodenal) and Dieulafoy Lesions Status Post Hemostasis in 2023  # Chronic normocytic DARRIN   # History of Perisplenic Varices in 2023   #Hyperbilirubinemia   - on weekly IV transfusion/iron infusions per hematology  -s/p EGD/colonoscopy/push enteroscopy on 7/22/24 showing AVMs and polyps    -monitor Hgb, keep active T&S, Avoid Nsaids   -PPI QD  -outpt follow up with GI and hepatology       #Acute diastolic heart failure   # Severe aortic stenosis being planned for TAVR  -TTE: 03/22/2023- EF 62%,  severe aortic stenosis, mildly increased left ventricular wall thickness, Grade II diastolic dysfunction of left ventricular myocardial filling, mild thickening of the anterior and posterior mitral valve leaflets, mild mitral valve regurgitation, mild tricuspid regurgitation  -outpt fu   -c/w Metoprolol  -Hold Lasix for now in setting of EVAN     #Gout  -completed 7 days of Prednisone 40mg     #DVT ppx- SCD given bleeding risk   #GI ppx-PPI  #Diet-DASH  #Activity-IAT   #Dispo-acute       Patient is a 78-year-old female with past medical history of   CKD, HTN, CCY, gout,  severe AS being planned for tAVR, and history of severe anemia secondary to chronic upper GI bleeding due to  AV malformation gastric body dieulafoy lesion, weekly blood transfusions presenting to ER for a rupture pressure ulcer of the buttock area today. Admitted for wound management and placement as pt can not care for her own ADLs.      #Severe pressure injury /ulcer   - Patient states that last night was on her toilet which cracked while she was sitting on it, and patient was unable to get off the toilet for 4 hours  -Burn consult   -would care   -pain control  -CK level   -may need placement to help with ADLs  -PT     #EVAN on CKD stage 4   -possible prerenal vs ATN  -S/p 1L NS in ED  -UA  -RBUS  -Cr 3.5 (baseline 2-3)  -Fu CK level   -hold Lasix  and fluids   -repeat BMP    # History of AVMs (gastric and duodenal) and Dieulafoy Lesions Status Post Hemostasis in 2023  # Chronic normocytic DARRIN   # History of Perisplenic Varices in 2023   #Hyperbilirubinemia   - on weekly IV transfusion/iron infusions per hematology  -s/p EGD/colonoscopy/push enteroscopy on 7/22/24 showing AVMs and polyps    -monitor Hgb, keep active T&S, Avoid Nsaids   -PPI QD  -outpt follow up with GI and hepatology       #Acute diastolic heart failure   # Severe aortic stenosis being planned for TAVR  -TTE: 03/22/2023- EF 62%,  severe aortic stenosis, mildly increased left ventricular wall thickness, Grade II diastolic dysfunction of left ventricular myocardial filling, mild thickening of the anterior and posterior mitral valve leaflets, mild mitral valve regurgitation, mild tricuspid regurgitation  -outpt fu   -c/w Metoprolol  -Hold Lasix for now in setting of EVAN     #Gout  -completed 7 days of Prednisone 40mg     #DVT ppx- SCD given bleeding risk   #GI ppx-PPI  #Diet-DASH  #Activity-IAT   #Dispo-acute       Patient is a 78-year-old female with past medical history of   CKD, HTN, CCY, gout,  severe AS being planned for tAVR, and history of severe anemia secondary to chronic upper GI bleeding due to  AV malformation gastric body dieulafoy lesion, weekly blood transfusions presenting to ER for a rupture pressure ulcer of the buttock area today. Admitted for wound management and placement as pt can not care for her own ADLs.      #Severe pressure injury /ulcer   - Patient states that last night was on her toilet which cracked while she was sitting on it, and patient was unable to get off the toilet for 4 hours  -Burn consult   -would care   -pain control  -CK 2766 > Trend  -may need placement to help with ADLs  -PT     #EVAN on CKD stage 4   -possible prerenal vs ATN  -S/p 1L NS in ED  -UA  -RBUS  -Cr 3.5 (baseline 2-3)  -CK 2766> gentle hydration (pt with severe AS and Diastolic HF)  -hold Lasix    -repeat BMP    # History of AVMs (gastric and duodenal) and Dieulafoy Lesions Status Post Hemostasis in 2023  # Chronic normocytic DARRIN   # History of Perisplenic Varices in 2023   #Hyperbilirubinemia   - on weekly IV transfusion/iron infusions per hematology  -s/p EGD/colonoscopy/push enteroscopy on 7/22/24 showing AVMs and polyps    -monitor Hgb, keep active T&S, Avoid Nsaids   -PPI QD  -outpt follow up with GI and hepatology       #Diastolic heart failure   # Severe aortic stenosis being planned for TAVR  -TTE: 03/22/2023- EF 62%,  severe aortic stenosis, mildly increased left ventricular wall thickness, Grade II diastolic dysfunction of left ventricular myocardial filling, mild thickening of the anterior and posterior mitral valve leaflets, mild mitral valve regurgitation, mild tricuspid regurgitation  -outpt fu   -c/w Metoprolol  -Hold Lasix for now in setting of EVAN     #Gout  -completed 7 days of Prednisone 40mg     #DVT ppx- SCD given bleeding risk   #GI ppx-PPI  #Diet-DASH  #Activity-IAT   #Dispo-acute

## 2024-07-30 NOTE — H&P ADULT - ATTENDING COMMENTS
HPI as above.  Interval history: Pt seen and examined at bedside. No cp or sob.   Vital Signs (24 Hrs):  T(C): 36.8 (07-30-24 @ 16:37), Max: 37.1 (07-29-24 @ 18:30)  HR: 70 (07-30-24 @ 16:49) (59 - 70)  BP: 139/71 (07-30-24 @ 16:49) (103/65 - 146/69)  RR: 18 (07-30-24 @ 15:57) (18 - 18)  SpO2: 98% (07-30-24 @ 15:57) (95% - 99%)  Wt(kg): --  Daily Height in cm: 170.18 (29 Jul 2024 18:30)    Daily     I&O's Summary    PHYSICAL EXAM:  GENERAL: NAD obese   HEAD:  Atraumatic, Normocephalic  EYES: EOMI, PERRLA, conjunctiva and sclera clear  NECK: Supple, No JVD  CHEST/LUNG: Clear to auscultation bilaterally; No wheeze  HEART: Regular rate and rhythm; No murmurs, rubs, or gallops  ABDOMEN: Soft, Nontender, Nondistended; Bowel sounds present  EXTREMITIES:  2+ Peripheral Pulses, No clubbing, cyanosis, or edema  PSYCH: AAOx3  NEUROLOGY: non-focal  SKIN: No rashes or lesions  Labs reviewed  Imaging reviewed independently and reviewed read  < from: US Kidney and Bladder (07.30.24 @ 07:49) >    IMPRESSION:    No definite hydronephrosis    < end of copied text >      EKG reviewed independently and reviewed read    Plan   78-year-old female with past medical history of   CKD, HTN, CCY, gout,  severe AS being planned for tAVR, and history of severe anemia secondary to chronic upper GI bleeding due to  AV malformation gastric body dieulafoy lesion, weekly blood transfusions presenting to ER for a rupture pressure ulcer of the buttock area today. Admitted for wound management and placement as pt can not care for her own ADLs.      #Severe pressure injury /ulcer  possible cellulitis  - Patient states that last night was on her toilet which cracked while she was sitting on it, and patient was unable to get off the toilet for 4 hours  -would care   -pain control  -CK 7669 > Trend  -may need placement to help with ADLs  -PT   - ID consult  - CTX daily     #EVAN on CKD stage 4   #rhabo   -possible ATN vs Cardio-renal syndrome?.   -S/p 1L NS in ED  -UA  -RBUS  -Cr 3.5 (baseline 2-3)  -CK 2766> gentle hydration (pt with severe AS and Diastolic HF), follow up CK today   -pt appears overloaded, lasix 40 mg given once today, follow up Cr and CK  -repeat BMP  - if CK and cr improving restart gentle hydration    # History of AVMs (gastric and duodenal) and Dieulafoy Lesions Status Post Hemostasis in 2023  # Chronic normocytic DARRIN   # History of Perisplenic Varices in 2023   #Hyperbilirubinemia   - on weekly IV transfusion/iron infusions per hematology  -s/p EGD/colonoscopy/push enteroscopy on 7/22/24 showing AVMs and polyps    -monitor Hgb, keep active T&S, Avoid Nsaids   -PPI QD  -outpt follow up with GI and hepatology       #Diastolic heart failure   # Severe aortic stenosis being planned for TAVR  -TTE: 03/22/2023- EF 62%,  severe aortic stenosis, mildly increased left ventricular wall thickness, Grade II diastolic dysfunction of left ventricular myocardial filling, mild thickening of the anterior and posterior mitral valve leaflets, mild mitral valve regurgitation, mild tricuspid regurgitation  -outpt fu   -c/w Metoprolol  - follow up Cr, pt appears to be overloaded, was given lasix 40 mg intravenous, but pt CK elevated, possible pre renal, will follow up labs today, if ck and cr improving will continue IVF.   - cardiology outpt, for possible tavr  - repeat echo     #Progress Note Handoff  Pending (specify):  follow up labs, pt has right chest port which was accessed by nursing staff, pt refusing intravenous and labs peripherally   Family discussion: house staff updated pt family  Disposition: PT, placement  Decision to admit the pt is based on acuity as above

## 2024-07-30 NOTE — ED ADULT NURSE NOTE - NSFALLHARMRISKINTERV_ED_ALL_ED
Assistance OOB with selected safe patient handling equipment if applicable/Assistance with ambulation/Communicate risk of Fall with Harm to all staff, patient, and family/Monitor gait and stability/Provide visual cue: red socks, yellow wristband, yellow gown, etc/Reinforce activity limits and safety measures with patient and family/Bed in lowest position, wheels locked, appropriate side rails in place/Call bell, personal items and telephone in reach/Instruct patient to call for assistance before getting out of bed/chair/stretcher/Non-slip footwear applied when patient is off stretcher/[place] to call system/Physically safe environment - no spills, clutter or unnecessary equipment/Purposeful Proactive Rounding/Room/bathroom lighting operational, light cord in reach

## 2024-07-30 NOTE — ED ADULT NURSE NOTE - SUICIDE SCREENING QUESTION 3
Patient sent in by Dr. Morin for DNC. Patient reports she is 3 months post partum and has heavy vaginal bleeding. No

## 2024-07-30 NOTE — ADVANCED PRACTICE NURSE CONSULT - RECOMMEDATIONS
Cleanse wound to bilateral buttocks and sacrum with Vashe wound cleanser (stocked in the store room).   Pat dry, apply Silvadene, cover with Xeroform and abdominal pad twice a day and prn for soiling.     Maintain pressure injury prevention.   Keep skin clean.   Maintain incontinence care.   Monitor skin for changes and notify provider   Case discussed with primary RN

## 2024-07-30 NOTE — CONSULT NOTE ADULT - SUBJECTIVE AND OBJECTIVE BOX
HPI : Patient is a 78-year-old female with past medical history of   CKD, HTN, CCY, gout,  severe AS being planned for tAVR, and history of severe anemia secondary to chronic upper GI bleeding due to  AV malformation gastric body dieulafoy lesion, weekly blood transfusions presenting to ER for a rupture pressure ulcer of the buttock area today.   Patient states that last night was on her toilet which cracked while she was sitting on it, and patient was unable to get off the toilet for 4 hours.  By the time EMS arrived skin had ulcerated, with a blister that ruptured predominantly to the left buttock area (appears to be like burn blister that ruptured) with linear abrasions to the bilateral upper thighs/buttocks.  Patient in so much pain that she cannot stand or walk, Of note, pt was recently admitted to the hospital between - for hematochezia, s/p 1 unit prbc. Denies fever, chills. N/V, abd pain.     Vital Signs Last 24 Hrs  T(C): 36.4 (2024 00:51), Max: 37.1 (2024 18:30)  T(F): 97.6 (2024 00:51), Max: 98.7 (2024 18:30)  HR: 65 (2024 00:51) (59 - 65)  BP: 146/69 (2024 00:51) (112/46 - 146/69)  BP(mean): --  RR: 18 (2024 00:51) (18 - 18)  SpO2: 99% (2024 00:51) (95% - 99%)    Parameters below as of 2024 00:51  Patient On (Oxygen Delivery Method): room air    Labs significant for wbc 11k , hgb 9.1 , Cr 3.5 (baseline 2-3)   s/p  1L NS in ED   Admitted for wound management and placement as pt can not care for her own ADLs.      PAST MEDICAL & SURGICAL HISTORY:  HTN (hypertension)      Heart murmur      Eczema      Anemia  iron infusions and PRBC transfusions      Aortic stenosis      Chronic kidney disease (CKD)       novel coronavirus disease (COVID-19)  2020- REHAB admission      Gastric AVM      H/O appendicitis      S/P cholecystectomy      History of esophagogastroduodenoscopy (EGD)      H/O colonoscopy      H/O  section      History of appendectomy      Port-A-Cath in place  right           Hospital Course:    TODAY'S SUBJECTIVE & REVIEW OF SYMPTOMS:     Constitutional WNL   Cardio WNL   Resp WNL   GI WNL  Heme WNL  Endo WNL  Skin WNL  MSK Weakness   Neuro WNL  Cognitive WNL  Psych WNL      MEDICATIONS  (STANDING):  cefTRIAXone   IVPB 1000 milliGRAM(s) IV Intermittent every 24 hours  metoprolol tartrate 25 milliGRAM(s) Oral daily  pantoprazole    Tablet 40 milliGRAM(s) Oral before breakfast  silver sulfADIAZINE 1% Cream 1 Application(s) Topical daily    MEDICATIONS  (PRN):  acetaminophen     Tablet .. 650 milliGRAM(s) Oral every 6 hours PRN Mild Pain (1 - 3)      FAMILY HISTORY:  FH: kidney disease (Father)    FH: breast cancer (Mother)    FH: multiple myeloma (Mother)        Allergies    aspirin (Rash; Other)  penicillins (Rash; Urticaria; Hives; Blisters)  latex (Unknown)  EKG leads (Hives)    Intolerances        SOCIAL HISTORY:    [  ] Etoh  [  ] Smoking  [  ] Substance abuse     Home Environment:  [   ] Home Alone  [ x  ] Lives with Family  [   ] Home Health Aid    Dwelling:  [   ] Apartment  [ x  ] Private House  [   ] Adult Home  [   ] Skilled Nursing Facility      [   ] Short Term  [   ] Long Term  [ x  ] Stairs       Elevator [   ]    FUNCTIONAL STATUS PTA: (Check all that apply)  Ambulation: [  x  ]Independent    [   ] Dependent     [   ] Non-Ambulatory  Assistive Device: [   ] SA Cane  [   ]  Q Cane  [   ] Walker  [   ]  Wheelchair  ADL : [ x  ] Independent  [    ]  Dependent       Vital Signs Last 24 Hrs  T(C): 36.8 (2024 16:37), Max: 37.1 (2024 18:30)  T(F): 98.3 (2024 16:37), Max: 98.7 (2024 18:30)  HR: 70 (2024 16:49) (59 - 70)  BP: 139/71 (2024 16:49) (103/65 - 146/69)  BP(mean): --  RR: 18 (2024 15:57) (18 - 18)  SpO2: 98% (2024 15:57) (95% - 99%)    Parameters below as of 2024 15:57  Patient On (Oxygen Delivery Method): room air          PHYSICAL EXAM: Awake & Alert  GENERAL: NAD  HEAD:  Normocephalic  CHEST/LUNG: Clear   HEART: S1S2+  ABDOMEN: Soft, Nontender  EXTREMITIES:  no calf tenderness    NERVOUS SYSTEM:  Cranial Nerves 2-12 intact [   ] Abnormal  [   ]  ROM: WFL all extremities [   ]  Abnormal [x   ]limited tamanna LE   Motor Strength: WFL all extremities  [   ]  Abnormal [ x  ]limited tamanna LE - pain   Sensation: intact to light touch [  x ] Abnormal [   ]    FUNCTIONAL STATUS:  Bed Mobility: Independent [   ]  Supervision [   ]  Needs Assistance [ x  ]  N/A [   ]  Transfers: Independent [   ]  Supervision [   ]  Needs Assistance [   ]  N/A [   ]   Ambulation: Independent [   ]  Supervision [   ]  Needs Assistance [   ]  N/A [   ]  ADL: Independent [   ] Requires Assistance [   ] N/A [   ]      LABS:                        9.1    11.51 )-----------( 156      ( 2024 22:25 )             29.9     07-    143  |  108  |  99<HH>  ----------------------------<  85  4.6   |  22  |  3.5<H>    Ca    8.3<L>      2024 22:25    TPro  5.2<L>  /  Alb  3.4<L>  /  TBili  1.8<H>  /  DBili  x   /  AST  107<H>  /  ALT  33  /  AlkPhos  83  07-29      Urinalysis Basic - ( 2024 22:25 )    Color: x / Appearance: x / SG: x / pH: x  Gluc: 85 mg/dL / Ketone: x  / Bili: x / Urobili: x   Blood: x / Protein: x / Nitrite: x   Leuk Esterase: x / RBC: x / WBC x   Sq Epi: x / Non Sq Epi: x / Bacteria: x        RADIOLOGY & ADDITIONAL STUDIES:

## 2024-07-31 LAB
APPEARANCE UR: CLEAR — SIGNIFICANT CHANGE UP
BILIRUB UR-MCNC: NEGATIVE — SIGNIFICANT CHANGE UP
COLOR SPEC: YELLOW — SIGNIFICANT CHANGE UP
CREAT ?TM UR-MCNC: 67 MG/DL — SIGNIFICANT CHANGE UP
DIFF PNL FLD: NEGATIVE — SIGNIFICANT CHANGE UP
GLUCOSE UR QL: NEGATIVE MG/DL — SIGNIFICANT CHANGE UP
HCT VFR BLD CALC: 24.7 % — LOW (ref 37–47)
HGB BLD-MCNC: 7.5 G/DL — LOW (ref 12–16)
KETONES UR-MCNC: NEGATIVE MG/DL — SIGNIFICANT CHANGE UP
LEUKOCYTE ESTERASE UR-ACNC: ABNORMAL
MCHC RBC-ENTMCNC: 29.1 PG — SIGNIFICANT CHANGE UP (ref 27–31)
MCHC RBC-ENTMCNC: 30.4 G/DL — LOW (ref 32–37)
MCV RBC AUTO: 95.7 FL — SIGNIFICANT CHANGE UP (ref 81–99)
NITRITE UR-MCNC: NEGATIVE — SIGNIFICANT CHANGE UP
NRBC # BLD: 0 /100 WBCS — SIGNIFICANT CHANGE UP (ref 0–0)
OSMOLALITY UR: 380 MOS/KG — SIGNIFICANT CHANGE UP (ref 50–1200)
PH UR: 5.5 — SIGNIFICANT CHANGE UP (ref 5–8)
PLATELET # BLD AUTO: 123 K/UL — LOW (ref 130–400)
PMV BLD: 10.9 FL — HIGH (ref 7.4–10.4)
POTASSIUM UR-SCNC: 31 MMOL/L — SIGNIFICANT CHANGE UP
PROT ?TM UR-MCNC: 5 MG/DLG/24H — SIGNIFICANT CHANGE UP
PROT UR-MCNC: NEGATIVE MG/DL — SIGNIFICANT CHANGE UP
PROT/CREAT UR-RTO: 0.1 RATIO — SIGNIFICANT CHANGE UP (ref 0–0.2)
RBC # BLD: 2.58 M/UL — LOW (ref 4.2–5.4)
RBC # FLD: 18.6 % — HIGH (ref 11.5–14.5)
SODIUM UR-SCNC: 35 MMOL/L — SIGNIFICANT CHANGE UP
SP GR SPEC: 1.01 — SIGNIFICANT CHANGE UP (ref 1–1.03)
UROBILINOGEN FLD QL: 0.2 MG/DL — SIGNIFICANT CHANGE UP (ref 0.2–1)
UUN UR-MCNC: 633 MG/DL — SIGNIFICANT CHANGE UP
WBC # BLD: 6.89 K/UL — SIGNIFICANT CHANGE UP (ref 4.8–10.8)
WBC # FLD AUTO: 6.89 K/UL — SIGNIFICANT CHANGE UP (ref 4.8–10.8)

## 2024-07-31 PROCEDURE — 71045 X-RAY EXAM CHEST 1 VIEW: CPT | Mod: 26

## 2024-07-31 PROCEDURE — 99233 SBSQ HOSP IP/OBS HIGH 50: CPT

## 2024-07-31 RX ORDER — FUROSEMIDE 40 MG
40 TABLET ORAL DAILY
Refills: 0 | Status: DISCONTINUED | OUTPATIENT
Start: 2024-07-31 | End: 2024-07-31

## 2024-07-31 RX ADMIN — Medication 40 MILLIGRAM(S): at 05:58

## 2024-07-31 RX ADMIN — Medication 1 APPLICATION(S): at 10:47

## 2024-07-31 RX ADMIN — Medication 100 MILLIGRAM(S): at 10:47

## 2024-07-31 RX ADMIN — METOPROLOL TARTRATE 25 MILLIGRAM(S): 100 TABLET ORAL at 05:58

## 2024-07-31 NOTE — CONSULT NOTE ADULT - SUBJECTIVE AND OBJECTIVE BOX
SUBJECTIVE:  Patient is a 78y old Female who presents with a chief complaint of lacerations on buttocks.    HPI  Patient is a 78-year-old female with past medical history of   CKD, HTN, CCY, gout,  severe AS being planned for tAVR, and history of severe anemia secondary to chronic upper GI bleeding due to  AV malformation gastric body dieulafoy lesion, weekly blood transfusions presenting to ER for a rupture pressure ulcer of the buttock area today.   Patient states that last night was on her toilet which cracked while she was sitting on it, and patient was unable to get off the toilet for 4 hours.  By the time EMS arrived skin had ulcerated, with a blister that ruptured predominantly to the left buttock area (appears to be like burn blister that ruptured) with linear abrasions to the bilateral upper thighs/buttocks.  Patient in so much pain that she cannot stand or walk, Of note, pt was recently admitted to the hospital between - for hematochezia, s/p 1 unit prbc. Denies fever, chills. N/V, abd pain.     Vital Signs Last 24 Hrs  T(C): 36.4 (2024 00:51), Max: 37.1 (2024 18:30)  T(F): 97.6 (2024 00:51), Max: 98.7 (2024 18:30)  HR: 65 (2024 00:51) (59 - 65)  BP: 146/69 (2024 00:51) (112/46 - 146/69)  BP(mean): --  RR: 18 (2024 00:51) (18 - 18)  SpO2: 99% (2024 00:51) (95% - 99%)    Parameters below as of 2024 00:51  Patient On (Oxygen Delivery Method): room air    Labs significant for wbc 11k , hgb 9.1 , Cr 3.5 (baseline 2-3)   s/p  1L NS in ED   Admitted for wound management and placement as pt can not care for her own ADLs. (2024 03:46)      PAST MEDICAL & SURGICAL HISTORY  HTN (hypertension)    Heart murmur    Eczema    Anemia  iron infusions and PRBC transfusions    Aortic stenosis    Chronic kidney disease (CKD)    2019 novel coronavirus disease (COVID-19)  2020- REHAB admission    Gastric AVM    H/O appendicitis    S/P cholecystectomy    History of esophagogastroduodenoscopy (EGD)    H/O colonoscopy    H/O  section    History of appendectomy    Port-A-Cath in place  right CW      ALLERGIES:  aspirin (Rash; Other)  penicillins (Rash; Urticaria; Hives; Blisters)  latex (Unknown)  EKG leads (Hives)    MEDICATIONS:  HOME MEDICATIONS  furosemide 40 mg oral tablet: 1 tab(s) orally once a day  metoprolol tartrate 25 mg oral tablet: 1 tab(s) orally once a day  pantoprazole 40 mg oral delayed release tablet: 1 tab(s) orally 2 times a day  predniSONE 20 mg oral tablet: 2 tab(s) orally once a day    STANDING MEDICATIONS  metoprolol tartrate 25 milliGRAM(s) Oral daily  pantoprazole    Tablet 40 milliGRAM(s) Oral before breakfast  silver sulfADIAZINE 1% Cream 1 Application(s) Topical daily    PRN MEDICATIONS  acetaminophen     Tablet .. 650 milliGRAM(s) Oral every 6 hours PRN    VITALS:   T(C): 36.9 (24 @ 07:19), Max: 37.1 (24 @ 20:22)  T(F): 98.4 (24 @ 07:19), Max: 98.8 (24 @ 20:22)  HR: 65 (24 @ 07:19) (62 - 77)  BP: 117/55 (24 @ 07:19) (103/65 - 139/71)  BP(mean): --  ABP: --  ABP(mean): --  RR: 18 (24 @ 07:19) (18 - 18)  SpO2: 99% (24 @ 07:19) (98% - 99%)  LABS:                        7.5    6.89  )-----------( 123      ( 2024 06:10 )             24.7         139  |  107  |  101<HH>  ----------------------------<  143<H>  4.2   |  20  |  3.6<H>    Ca    7.4<L>      2024 18:52  Mg     2.1         TPro  4.4<L>  /  Alb  2.8<L>  /  TBili  0.7  /  DBili  x   /  AST  64<H>  /  ALT  27  /  AlkPhos  70        Urinalysis Basic - ( 2024 11:00 )    Color: Yellow / Appearance: Clear / S.014 / pH: x  Gluc: x / Ketone: Negative mg/dL  / Bili: Negative / Urobili: 0.2 mg/dL   Blood: x / Protein: Negative mg/dL / Nitrite: Negative   Leuk Esterase: Large / RBC: 0 /HPF /  /HPF   Sq Epi: x / Non Sq Epi: 2 /HPF / Bacteria: Negative /HPF      I&O's Detail    2024 07:01  -  2024 07:00  --------------------------------------------------------  IN:  Total IN: 0 mL    OUT:    Voided (mL): 900 mL  Total OUT: 900 mL    Total NET: -900 mL          Creatine Kinase, Serum: 934 U/L *H* (24 @ 18:52)        Urinalysis with Rflx Culture (collected 2024 17:58)      CARDIAC MARKERS ( 2024 18:52 )  x     / x     / 934 U/L / x     / x      CARDIAC MARKERS ( 2024 04:54 )  x     / x     / 2766 U/L / x     / x          RADIOLOGY:  EKG  12 Lead ECG:   Ventricular Rate 78 BPM    Atrial Rate 78 BPM    P-R Interval 186 ms    QRS Duration 94 ms    Q-T Interval 438 ms    QTC Calculation(Bazett) 499 ms    P Axis 70 degrees    R Axis -42 degrees    T Axis -35 degrees    Diagnosis Line Sinus rhythm withfrequent Premature ventricular complexes  Left axis deviation  Incomplete right bundle branch block  Minimal voltage criteria for LVH, may be normal variant  ST & T wave abnormality, consider inferior ischemia  Abnormal ECG    Confirmed by El Zhao ([id number]) on 2024 6:30:32 PM (24 @ 16:05)  12 Lead ECG:   Ventricular Rate 72 BPM    Atrial Rate 72 BPM    P-R Interval 134 ms    QRS Duration 112 ms    Q-T Interval 444 ms    QTC Calculation(Bazett) 486 ms    P Axis -48 degrees    R Axis -41 degrees    T Axis -74 degrees    Diagnosis Line Sinus rhythm  Left axis deviation  Incomplete right bundle branch block  Left ventricular hypertrophy ( R in aVL , Adan product , Romhilt-Mcginnis )  Cannot rule out Septal infarct , age undetermined  ST & T wave abnormality, consider inferior ischemia  ST & T wave abnormality, consider anterolateral ischemia  Abnormal ECG    Confirmed by El Zhao ([id number]) on 2023 7:54:59 AM (23 @ 16:32)    Xray Chest 1 View- PORTABLE-Urgent:   ACC: [id number] EXAM:  XR CHEST PORTABLE URGENT 1V   ORDERED BY: JUSTIN SUAREZ     PROCEDURE DATE:  2024          INTERPRETATION:  Clinical History / Reason for exam: Shortness of breath    Comparison : Chest radiograph 2024.    Technique/Positioning: Frontal portable.    Findings:    Support devices: Right-sided Mediport    Cardiac/mediastinum/hilum: Cardiomegaly    Lung parenchyma/Pleura: Hazy appearance of the lungs    Skeleton/soft tissues: Unchanged    Impression:    Cardiomegaly with unchanged appearance of the thorax.        --- End of Report ---            DARREN PANIAGUA MD; Attending Interventional Radiologist  This document has been electronically signed. 2024 12:06PM (24 @ 11:53)  Xray Chest 1 View-PORTABLE IMMEDIATE:   ACC: [id number] EXAM:  XR CHEST PORTABLE IMMED 1V   ORDERED BY: DONNA DE LA ROSA     PROCEDURE DATE:  2024          INTERPRETATION:  CLINICAL HISTORY: epigastric pain.    COMPARISON: 2023.    TECHNIQUE: Portable frontal chest radiograph. Adequate positioning.    FINDINGS:    Support devices: Status post right IJ Port-A-Cath in stable position.    Cardiac/mediastinum/hilum: Stable.    Lung parenchyma/Pleura: No focal parenchymal opacities, pleural   effusions, or pneumothorax.    Skeleton/soft tissues: Stable.      IMPRESSION:    No radiographic evidence of acute cardiopulmonary disease.    --- End of Report ---            OSCAR GALINDO MD; Attending Radiologist  This document has been electronically signed. 2024  8:06AM (24 @ 16:26)    Physical Exam:  General: no acute distress, lying in bed  Head: normocephalic and atraumatic  Neck: supple  Heart: regular rate and rhythm, S1 and S2 normal, no murmurs, rubs or gallops noted on exam  Lungs: Symmetric chest expansion bilaterally, clear lungs bilaterally, no wheezes rhonchi or crackles heard  Abdomen: Bowel sounds present, non tender on light and deep palpation  Extremities: 2+ pitting edema b/l LE SUBJECTIVE:  Patient is a 78y old Female who presents with a chief complaint of lacerations on buttocks.    HPI  Patient is a 78-year-old female with past medical history of   CKD, HTN, CCY, gout,  severe AS being planned for tAVR, and history of severe anemia secondary to chronic upper GI bleeding due to  AV malformation gastric body dieulafoy lesion, weekly blood transfusions presenting to ER for a rupture pressure ulcer of the buttock area today.   Patient states that last night was on her toilet which cracked while she was sitting on it, and patient was unable to get off the toilet for 4 hours.  By the time EMS arrived skin had ulcerated, with a blister that ruptured predominantly to the left buttock area (appears to be like burn blister that ruptured) with linear abrasions to the bilateral upper thighs/buttocks.  Patient in so much pain that she cannot stand or walk, Of note, pt was recently admitted to the hospital between - for hematochezia, s/p 1 unit prbc. Denies fever, chills. N/V, abd pain.     Vital Signs Last 24 Hrs  T(C): 36.4 (2024 00:51), Max: 37.1 (2024 18:30)  T(F): 97.6 (2024 00:51), Max: 98.7 (2024 18:30)  HR: 65 (2024 00:51) (59 - 65)  BP: 146/69 (2024 00:51) (112/46 - 146/69)  BP(mean): --  RR: 18 (2024 00:51) (18 - 18)  SpO2: 99% (2024 00:51) (95% - 99%)    Parameters below as of 2024 00:51  Patient On (Oxygen Delivery Method): room air    Labs significant for wbc 11k , hgb 9.1 , Cr 3.5 (baseline 2-3)   s/p 1L NS in ED   Admitted for wound management and placement as pt can not care for her own ADLs. (2024 03:46)      PAST MEDICAL & SURGICAL HISTORY  HTN (hypertension)    Heart murmur    Eczema    Anemia  iron infusions and PRBC transfusions    Aortic stenosis    Chronic kidney disease (CKD)    2019 novel coronavirus disease (COVID-19)  2020- REHAB admission    Gastric AVM    H/O appendicitis    S/P cholecystectomy    History of esophagogastroduodenoscopy (EGD)    H/O colonoscopy    H/O  section    History of appendectomy    Port-A-Cath in place  right CW      ALLERGIES:  aspirin (Rash; Other)  penicillins (Rash; Urticaria; Hives; Blisters)  latex (Unknown)  EKG leads (Hives)    MEDICATIONS:  HOME MEDICATIONS  furosemide 40 mg oral tablet: 1 tab(s) orally once a day  metoprolol tartrate 25 mg oral tablet: 1 tab(s) orally once a day  pantoprazole 40 mg oral delayed release tablet: 1 tab(s) orally 2 times a day  predniSONE 20 mg oral tablet: 2 tab(s) orally once a day    STANDING MEDICATIONS  metoprolol tartrate 25 milliGRAM(s) Oral daily  pantoprazole    Tablet 40 milliGRAM(s) Oral before breakfast  silver sulfADIAZINE 1% Cream 1 Application(s) Topical daily    PRN MEDICATIONS  acetaminophen     Tablet .. 650 milliGRAM(s) Oral every 6 hours PRN    VITALS:   T(C): 36.9 (24 @ 07:19), Max: 37.1 (24 @ 20:22)  T(F): 98.4 (24 @ 07:19), Max: 98.8 (24 @ 20:22)  HR: 65 (24 @ 07:19) (62 - 77)  BP: 117/55 (24 @ 07:19) (103/65 - 139/71)  BP(mean): --  ABP: --  ABP(mean): --  RR: 18 (24 @ 07:19) (18 - 18)  SpO2: 99% (24 @ 07:19) (98% - 99%)  LABS:                        7.5    6.89  )-----------( 123      ( 2024 06:10 )             24.7         139  |  107  |  101<HH>  ----------------------------<  143<H>  4.2   |  20  |  3.6<H>    Ca    7.4<L>      2024 18:52  Mg     2.1         TPro  4.4<L>  /  Alb  2.8<L>  /  TBili  0.7  /  DBili  x   /  AST  64<H>  /  ALT  27  /  AlkPhos  70        Urinalysis Basic - ( 2024 11:00 )    Color: Yellow / Appearance: Clear / S.014 / pH: x  Gluc: x / Ketone: Negative mg/dL  / Bili: Negative / Urobili: 0.2 mg/dL   Blood: x / Protein: Negative mg/dL / Nitrite: Negative   Leuk Esterase: Large / RBC: 0 /HPF /  /HPF   Sq Epi: x / Non Sq Epi: 2 /HPF / Bacteria: Negative /HPF      I&O's Detail    2024 07:01  -  2024 07:00  --------------------------------------------------------  IN:  Total IN: 0 mL    OUT:    Voided (mL): 900 mL  Total OUT: 900 mL    Total NET: -900 mL          Creatine Kinase, Serum: 934 U/L *H* (24 @ 18:52)        Urinalysis with Rflx Culture (collected 2024 17:58)      CARDIAC MARKERS ( 2024 18:52 )  x     / x     / 934 U/L / x     / x      CARDIAC MARKERS ( 2024 04:54 )  x     / x     / 2766 U/L / x     / x          RADIOLOGY:  EKG  12 Lead ECG:   Ventricular Rate 78 BPM    Atrial Rate 78 BPM    P-R Interval 186 ms    QRS Duration 94 ms    Q-T Interval 438 ms    QTC Calculation(Bazett) 499 ms    P Axis 70 degrees    R Axis -42 degrees    T Axis -35 degrees    Diagnosis Line Sinus rhythm withfrequent Premature ventricular complexes  Left axis deviation  Incomplete right bundle branch block  Minimal voltage criteria for LVH, may be normal variant  ST & T wave abnormality, consider inferior ischemia  Abnormal ECG    Confirmed by El Zhao ([id number]) on 2024 6:30:32 PM (24 @ 16:05)  12 Lead ECG:   Ventricular Rate 72 BPM    Atrial Rate 72 BPM    P-R Interval 134 ms    QRS Duration 112 ms    Q-T Interval 444 ms    QTC Calculation(Bazett) 486 ms    P Axis -48 degrees    R Axis -41 degrees    T Axis -74 degrees    Diagnosis Line Sinus rhythm  Left axis deviation  Incomplete right bundle branch block  Left ventricular hypertrophy ( R in aVL , Adan product , Romhilt-Mcginnis )  Cannot rule out Septal infarct , age undetermined  ST & T wave abnormality, consider inferior ischemia  ST & T wave abnormality, consider anterolateral ischemia  Abnormal ECG    Confirmed by El Zhao ([id number]) on 2023 7:54:59 AM (23 @ 16:32)    Xray Chest 1 View- PORTABLE-Urgent:   ACC: [id number] EXAM:  XR CHEST PORTABLE URGENT 1V   ORDERED BY: JUSTIN SUAREZ     PROCEDURE DATE:  2024          INTERPRETATION:  Clinical History / Reason for exam: Shortness of breath    Comparison : Chest radiograph 2024.    Technique/Positioning: Frontal portable.    Findings:    Support devices: Right-sided Mediport    Cardiac/mediastinum/hilum: Cardiomegaly    Lung parenchyma/Pleura: Hazy appearance of the lungs    Skeleton/soft tissues: Unchanged    Impression:    Cardiomegaly with unchanged appearance of the thorax.        --- End of Report ---            DARREN PANIAGUA MD; Attending Interventional Radiologist  This document has been electronically signed. 2024 12:06PM (24 @ 11:53)  Xray Chest 1 View-PORTABLE IMMEDIATE:   ACC: [id number] EXAM:  XR CHEST PORTABLE IMMED 1V   ORDERED BY: DONNA DE LA ROSA     PROCEDURE DATE:  2024          INTERPRETATION:  CLINICAL HISTORY: epigastric pain.    COMPARISON: 2023.    TECHNIQUE: Portable frontal chest radiograph. Adequate positioning.    FINDINGS:    Support devices: Status post right IJ Port-A-Cath in stable position.    Cardiac/mediastinum/hilum: Stable.    Lung parenchyma/Pleura: No focal parenchymal opacities, pleural   effusions, or pneumothorax.    Skeleton/soft tissues: Stable.      IMPRESSION:    No radiographic evidence of acute cardiopulmonary disease.    --- End of Report ---            OSCAR GALINDO MD; Attending Radiologist  This document has been electronically signed. 2024  8:06AM (24 @ 16:26)    Physical Exam:  General: no acute distress, lying in bed  Head: normocephalic and atraumatic  Neck: supple  Heart: regular rate and rhythm, S1 and S2 normal  Lungs: Symmetric chest expansion bilaterally, clear lungs bilaterally, no wheezes rhonchi or crackles heard  Abdomen: Bowel sounds present, non tender on light and deep palpation  Extremities: 2+ pitting edema b/l LE

## 2024-07-31 NOTE — PROGRESS NOTE ADULT - ASSESSMENT
78-year-old female with past medical history of   CKD, HTN, CCY, gout,  severe AS being planned for TAVR, and history of severe anemia secondary to chronic upper GI bleeding due to  AV malformation gastric body dieulafoy lesion, weekly blood transfusions presenting to ER for a rupture pressure ulcer of the buttock area today. Admitted for wound management and placement as pt can not care for her own ADLs.    # Pressure injury from sitting on toilet for 4 hours  to be seen by burn and wound care   no IV abx as per ID.  DC ceftriaxone    #Laura-- last creat 3.6 rom yesterday no new labs today-- s/p 1L of Iv fluids.    baseline creat 1.8  US kidneys-- no hydronephrosis   Can hold lasix for AM     # Severe AS pending TAVR-- NO CHF On CXR  no edema or SOB  # Severe Anemia-- hb 7.5 gets weekly transfusions   AV malformation in GI Tract--hx of GI bleed    # hx of gout-- not on allopurinol.    # Morbid obesity is present.  PT eval  and nephrology eval

## 2024-07-31 NOTE — PROGRESS NOTE ADULT - SUBJECTIVE AND OBJECTIVE BOX
SUBJECTIVE:    Patient is a 78y old Female who presents with a chief complaint of Sacral Ulcer (2024 11:27)    Currently admitted to medicine with the primary diagnosis of Pressure ulcer       Today is hospital day 2d.     PAST MEDICAL & SURGICAL HISTORY  HTN (hypertension)    Heart murmur    Eczema    Anemia  iron infusions and PRBC transfusions    Aortic stenosis    Chronic kidney disease (CKD)    2019 novel coronavirus disease (COVID-19)  2020- REHAB admission    Gastric AVM    H/O appendicitis    S/P cholecystectomy    History of esophagogastroduodenoscopy (EGD)    H/O colonoscopy    H/O  section    History of appendectomy    Port-A-Cath in place  right CW      ALLERGIES:  aspirin (Rash; Other)  penicillins (Rash; Urticaria; Hives; Blisters)  latex (Unknown)  EKG leads (Hives)    MEDICATIONS:  STANDING MEDICATIONS  cefTRIAXone   IVPB 1000 milliGRAM(s) IV Intermittent every 24 hours  metoprolol tartrate 25 milliGRAM(s) Oral daily  pantoprazole    Tablet 40 milliGRAM(s) Oral before breakfast  silver sulfADIAZINE 1% Cream 1 Application(s) Topical daily    PRN MEDICATIONS  acetaminophen     Tablet .. 650 milliGRAM(s) Oral every 6 hours PRN    VITALS:   T(F): 98.4  HR: 65  BP: 117/55  RR: 18  SpO2: 99%    LABS:                        7.5    6.89  )-----------( 123      ( 2024 06:10 )             24.7     07-    139  |  107  |  101<HH>  ----------------------------<  143<H>  4.2   |  20  |  3.6<H>    Ca    7.4<L>      2024 18:52  Mg     2.1     -    TPro  4.4<L>  /  Alb  2.8<L>  /  TBili  0.7  /  DBili  x   /  AST  64<H>  /  ALT  27  /  AlkPhos  70  07      Urinalysis Basic - ( 2024 11:00 )    Color: Yellow / Appearance: Clear / S.014 / pH: x  Gluc: x / Ketone: Negative mg/dL  / Bili: Negative / Urobili: 0.2 mg/dL   Blood: x / Protein: Negative mg/dL / Nitrite: Negative   Leuk Esterase: Large / RBC: 0 /HPF /  /HPF   Sq Epi: x / Non Sq Epi: 2 /HPF / Bacteria: Negative /HPF        Creatine Kinase, Serum: 934 U/L *H* (24 @ 18:52)      Urinalysis with Rflx Culture (collected 2024 17:58)      CARDIAC MARKERS ( 2024 18:52 )  x     / x     / 934 U/L / x     / x      CARDIAC MARKERS ( 2024 04:54 )  x     / x     / 2766 U/L / x     / x          RADIOLOGY:    PHYSICAL EXAM:  GEN: No acute distress  LUNGS: Clear to auscultation bilaterally   HEART: S1/S2 present. RRR. systolic murmur+  ABD/ GI: Soft, non-tender, non-distended. Bowel sounds present  EXT: NC/NC/NE/2+PP/VERGARA  NEURO: AAOX3

## 2024-07-31 NOTE — CHART NOTE - NSCHARTNOTEFT_GEN_A_CORE
As per wound care, cleanse wound to bilateral buttocks and sacrum with Vashe wound cleanser (stocked in the store room). Pat dry, apply Silvadene, cover with Xeroform and abdominal pad twice a day and prn for soiling. Spoke to burn, no need for consult at this time as pt was seen by wound care and they will consult burn if necessary.

## 2024-07-31 NOTE — PROGRESS NOTE ADULT - ASSESSMENT
78-year-old female with past medical history of   CKD, HTN, CCY, gout,  severe AS being planned for tAVR, and history of severe anemia secondary to chronic upper GI bleeding due to  AV malformation gastric body dieulafoy lesion, weekly blood transfusions presenting to ER for a rupture pressure ulcer of the buttock area today.   Patient states that last night was on her toilet which cracked while she was sitting on it, and patient was unable to get off the toilet for 4 hours.  By the time EMS arrived skin had ulcerated, with a blister that ruptured predominantly to the left buttock area (appears to be like burn blister that ruptured) with linear abrasions to the bilateral upper thighs/buttocks.  Patient in so much pain that she cannot stand or walk, Of note, pt was recently admitted to the hospital between 7/13-7/23 for hematochezia, s/p 1 unit prbc. Denies fever, chills. N/V, abd pain.    #Severe pressure injury /ulcer possible cellulitis  - pt was on toilet which cracked while she was sitting on it, and pt was unable to get off the toilet for 4 hours  -pain control w/ Tylenol for now   -CK 2766 > Trend  -may need placement to help with ADLs  - PT/OT consulted   - ID consulted  - wound care consulted   - c/w     #EVAN on CKD stage 4   #rhabo   -possible ATN vs Cardio-renal syndrome?.   -S/p 1L NS in ED  -UA  -RBUS  -Cr 3.5 (baseline 2-3)  -CK 2766> gentle hydration (pt with severe AS and Diastolic HF), follow up CK today   -pt appears overloaded, lasix 40 mg given once today, follow up Cr and CK  -repeat BMP  - if CK and cr improving restart gentle hydration    # History of AVMs (gastric and duodenal) and Dieulafoy Lesions Status Post Hemostasis in 2023  # Chronic normocytic iron deficiency anemia   # History of Perisplenic Varices in 2023   #Hyperbilirubinemia - resolved   - on weekly IV transfusion/iron infusions per hematology  -s/p EGD/colonoscopy/push enteroscopy on 7/22/24 showing AVMs and polyps    -monitor Hgb, keep active T&S, Avoid Nsaids   -PPI QD  -outpt follow up with GI and hepatology       #Diastolic heart failure   # Severe aortic stenosis being planned for TAVR  -TTE: 03/22/2023- EF 62%,  severe aortic stenosis, mildly increased left ventricular wall thickness, Grade II diastolic dysfunction of left ventricular myocardial filling, mild thickening of the anterior and posterior mitral valve leaflets, mild mitral valve regurgitation, mild tricuspid regurgitation  -outpt fu   -c/w Metoprolol  - follow up Cr, pt appears to be overloaded, was given lasix 40 mg intravenous, but pt CK elevated, possible pre renal, will follow up labs today, if ck and cr improving will continue IVF.   - cardiology outpt, for possible tavr  - repeat echo     #Progress Note Handoff  Pending (specify):  follow up labs, pt has right chest port which was accessed by nursing staff, pt refusing intravenous and labs peripherally   Family discussion: house staff updated pt family  Disposition: PT, placement  Decision to admit the pt is based on acuity as above .    79 y/o F w/ pmhx of CKD, HTN, CCY, gout, severe AS being planned for TAVR, and history of severe anemia secondary to chronic upper GI bleeding due to AV malformation gastric body dieulafoy lesion, weekly blood transfusions presenting to ER for a rupture pressure ulcer of the buttock area today. Pt was on toilet which cracked while she was sitting on it, pt unable to get off the toilet for 4 hours.  By the time EMS arrived skin had ulcerated, with a blister that ruptured predominantly to the left buttock area (appears to be like burn blister that ruptured) with linear abrasions to the bilateral upper thighs/buttocks.  Patient in so much pain that she cannot stand or walk, Of note, pt was recently admitted to the hospital between 7/13-7/23 for hematochezia, s/p 1 unit prbc. Denies fever, chills. N/V, abd pain.    #Severe pressure injury /ulcer possible cellulitis  - pt was on toilet which cracked while she was sitting on it, pt was unable to get off the toilet for 4 hours  -pain control w/ Tylenol for now   -CK 2766 > Trend  - PT/OT consulted   - ID consulted  - wound care consulted   - burn consulted     #EVAN on CKD stage 4   #rhabdo   -possible ATN vs cardio-renal syndrome  -S/p 1L NS in ED  -UA positive   -RBUS: no definite hydronephrosis   -Cr 3.5 (baseline 2-3)  -CK 2766> gentle hydration (pt with severe AS and Diastolic HF)  - f/u repeat CK   - pt appears overloaded, f/u nephro recs for Lasix   - f/u CXR  - BMP q12 if on lasix   - if CK and cr improving restart gentle hydration  - f/u urine studies   - strict I&O's     # History of AVMs (gastric and duodenal) and Dieulafoy Lesions Status Post Hemostasis in 2023  # Chronic normocytic iron deficiency anemia   # History of Perisplenic Varices in 2023   #Hyperbilirubinemia - resolved   - on weekly IV transfusion/iron infusions per hematology  - s/p EGD/colonoscopy/push enteroscopy on 7/22/24 showing AVMs and polyps    - monitor Hgb, keep active T&S, Avoid NSAIDs   - PPI QD  - outpt follow up with GI and hepatology     #Diastolic heart failure   # Severe aortic stenosis being planned for TAVR  - TTE: 03/22/2023- EF 62%,  severe aortic stenosis, mildly increased left ventricular wall thickness, Grade II diastolic dysfunction of left ventricular myocardial filling, mild thickening of the anterior and posterior mitral valve leaflets, mild mitral valve regurgitation, mild tricuspid regurgitation  - c/w Metoprolol  - s/p lasix 40 mg  - CK elevated, f/u repeat   - cardiology outpt, for possible tavr  - repeat echo     DVT ppx: none   GI ppx: protoxin   Diet: DASH  Activity: AAT   Pending: repeat CK, CXR, urine studies, nephro f/u, echo    77 y/o F w/ pmhx of CKD, HTN, CCY, gout, severe AS being planned for TAVR, and history of severe anemia secondary to chronic upper GI bleeding due to AV malformation gastric body dieulafoy lesion, weekly blood transfusions presenting to ER for a rupture pressure ulcer of the buttock area today. Pt was on toilet which cracked while she was sitting on it, pt unable to get off the toilet for 4 hours.  By the time EMS arrived skin had ulcerated, with a blister that ruptured predominantly to the left buttock area (appears to be like burn blister that ruptured) with linear abrasions to the bilateral upper thighs/buttocks.  Patient in so much pain that she cannot stand or walk, Of note, pt was recently admitted to the hospital between 7/13-7/23 for hematochezia, s/p 1 unit prbc. Denies fever, chills. N/V, abd pain.    #Severe pressure injury /ulcer possible cellulitis  - pt was on toilet which cracked while she was sitting on it, pt was unable to get off the toilet for 4 hours  -pain control w/ Tylenol for now   -CK 2766 > Trend  - PT/OT consulted   - ID consulted  - wound care consulted   - burn consulted     #EVAN on CKD stage 4   #rhabdo   -possible ATN vs cardio-renal syndrome  -S/p 1L NS in ED  -UA positive   -RBUS: no definite hydronephrosis   -Cr 3.5 (baseline 2-3)  -CK 2766> gentle hydration (pt with severe AS and Diastolic HF)  - repeat   - per nephro: c/w home lasix 40mg PO daily, hold fluids   - CXR 7/31: cardiomegaly with unchanged appearance of the thorax  - BMP q12  - if CK and cr improving restart gentle hydration  - f/u urine studies   - strict I&O's     # History of AVMs (gastric and duodenal) and Dieulafoy Lesions Status Post Hemostasis in 2023  # Chronic normocytic iron deficiency anemia   # History of Perisplenic Varices in 2023   #Hyperbilirubinemia - resolved   - on weekly IV transfusion/iron infusions per hematology  - s/p EGD/colonoscopy/push enteroscopy on 7/22/24 showing AVMs and polyps    - monitor Hgb, keep active T&S, Avoid NSAIDs   - PPI QD  - outpt follow up with GI and hepatology     #Diastolic heart failure   # Severe aortic stenosis being planned for TAVR  - TTE: 03/22/2023- EF 62%,  severe aortic stenosis, mildly increased left ventricular wall thickness, Grade II diastolic dysfunction of left ventricular myocardial filling, mild thickening of the anterior and posterior mitral valve leaflets, mild mitral valve regurgitation, mild tricuspid regurgitation  - c/w Metoprolol  - s/p lasix 40 mg  - CK elevated, f/u repeat   - cardiology outpt, for possible tavr  - repeat echo     DVT ppx: none   GI ppx: protoxin   Diet: DASH  Activity: AAT   Pending: urine studies, echo    79 y/o F w/ pmhx of CKD, HTN, CCY, gout, severe AS being planned for TAVR, and history of severe anemia secondary to chronic upper GI bleeding due to AV malformation gastric body dieulafoy lesion, weekly blood transfusions presenting to ER for a rupture pressure ulcer of the buttock area today. Pt was on toilet which cracked while she was sitting on it, pt unable to get off the toilet for 4 hours.  By the time EMS arrived skin had ulcerated, with a blister that ruptured predominantly to the left buttock area (appears to be like burn blister that ruptured) with linear abrasions to the bilateral upper thighs/buttocks.  Patient in so much pain that she cannot stand or walk, Of note, pt was recently admitted to the hospital between 7/13-7/23 for hematochezia, s/p 1 unit prbc. Denies fever, chills. N/V, abd pain.    #Severe pressure injury /ulcer possible cellulitis  - pt was on toilet which cracked while she was sitting on it, pt was unable to get off the toilet for 4 hours  -pain control w/ Tylenol for now   -CK 2766 > Trend  - PT/OT consulted   - ID consulted  - wound care consulted   - burn consulted     #EVAN on CKD stage 4   #rhabdo   -possible ATN vs cardio-renal syndrome  -S/p 1L NS in ED  -UA positive   -RBUS: no definite hydronephrosis   -Cr 3.5 (baseline 2-3)  -CK 2766> gentle hydration (pt with severe AS and Diastolic HF)  - repeat   - per nephro: c/w home lasix 40mg PO daily, hold fluids   - CXR 7/31: cardiomegaly with unchanged appearance of the thorax  - if CK and cr improving restart gentle hydration  - f/u urine studies   - strict I&O's     # History of AVMs (gastric and duodenal) and Dieulafoy Lesions Status Post Hemostasis in 2023  # Chronic normocytic iron deficiency anemia   # History of Perisplenic Varices in 2023   #Hyperbilirubinemia - resolved   - on weekly IV transfusion/iron infusions per hematology  - s/p EGD/colonoscopy/push enteroscopy on 7/22/24 showing AVMs and polyps    - monitor Hgb, keep active T&S, Avoid NSAIDs   - PPI QD  - outpt follow up with GI and hepatology     #Diastolic heart failure   # Severe aortic stenosis being planned for TAVR  - TTE: 03/22/2023- EF 62%,  severe aortic stenosis, mildly increased left ventricular wall thickness, Grade II diastolic dysfunction of left ventricular myocardial filling, mild thickening of the anterior and posterior mitral valve leaflets, mild mitral valve regurgitation, mild tricuspid regurgitation  - c/w Metoprolol  - s/p lasix 40 mg  - CK elevated, f/u repeat   - cardiology outpt, for possible tavr  - repeat echo     DVT ppx: none   GI ppx: protoxin   Diet: DASH  Activity: AAT   Pending: urine studies, echo    77 y/o F w/ pmhx of CKD, HTN, CCY, gout, severe AS being planned for TAVR, and history of severe anemia secondary to chronic upper GI bleeding due to AV malformation gastric body dieulafoy lesion, weekly blood transfusions presenting to ER for a rupture pressure ulcer of the buttock area today. Pt was on toilet which cracked while she was sitting on it, pt unable to get off the toilet for 4 hours.  By the time EMS arrived skin had ulcerated, with a blister that ruptured predominantly to the left buttock area (appears to be like burn blister that ruptured) with linear abrasions to the bilateral upper thighs/buttocks.  Patient in so much pain that she cannot stand or walk, Of note, pt was recently admitted to the hospital between 7/13-7/23 for hematochezia, s/p 1 unit prbc. Denies fever, chills. N/V, abd pain.    #Severe pressure injury /ulcer possible cellulitis  - pt was on toilet which cracked while she was sitting on it, pt was unable to get off the toilet for 4 hours  -pain control w/ Tylenol for now   -CK 2766 > Trend  - PT/OT consulted   - ID consulted  - wound care consulted   - burn consulted     #EVAN on CKD stage 4   #rhabdo   -possible ATN vs cardio-renal syndrome  -S/p 1L NS in ED  -UA positive   -RBUS: no definite hydronephrosis   -Cr 3.5 (baseline 2-3)  -CK 2766> gentle hydration (pt with severe AS and Diastolic HF)  - repeat   - per nephro: c/w home lasix 40mg PO daily, hold fluids   - CXR 7/31: cardiomegaly with unchanged appearance of the thorax  - if CK and cr improving restart gentle hydration  - f/u urine studies   - strict I&O's   - start lasix tomorrow after morning CBC    # History of AVMs (gastric and duodenal) and Dieulafoy Lesions Status Post Hemostasis in 2023  # Chronic normocytic iron deficiency anemia   # History of Perisplenic Varices in 2023   #Hyperbilirubinemia - resolved   - on weekly IV transfusion/iron infusions per hematology  - s/p EGD/colonoscopy/push enteroscopy on 7/22/24 showing AVMs and polyps    - monitor Hgb, keep active T&S, Avoid NSAIDs   - PPI QD  - outpt follow up with GI and hepatology     #Diastolic heart failure   # Severe aortic stenosis being planned for TAVR  - TTE: 03/22/2023- EF 62%,  severe aortic stenosis, mildly increased left ventricular wall thickness, Grade II diastolic dysfunction of left ventricular myocardial filling, mild thickening of the anterior and posterior mitral valve leaflets, mild mitral valve regurgitation, mild tricuspid regurgitation  - c/w Metoprolol  - s/p lasix 40 mg  - CK elevated, f/u repeat   - cardiology outpt, for possible tavr  - repeat echo     DVT ppx: none   GI ppx: protoxin   Diet: DASH  Activity: AAT   Pending: urine studies, echo

## 2024-07-31 NOTE — PROGRESS NOTE ADULT - SUBJECTIVE AND OBJECTIVE BOX
SUBJECTIVE/OVERNIGHT EVENTS  Today is hospital day 2d. This morning patient was seen and examined at bedside, resting comfortably in bed. No acute or major events overnight.    MEDICATIONS  STANDING MEDICATIONS  cefTRIAXone   IVPB 1000 milliGRAM(s) IV Intermittent every 24 hours  metoprolol tartrate 25 milliGRAM(s) Oral daily  pantoprazole    Tablet 40 milliGRAM(s) Oral before breakfast  silver sulfADIAZINE 1% Cream 1 Application(s) Topical daily    PRN MEDICATIONS  acetaminophen     Tablet .. 650 milliGRAM(s) Oral every 6 hours PRN    VITALS  T(F): 98.4 (07-31-24 @ 07:19), Max: 98.8 (07-30-24 @ 20:22)  HR: 65 (07-31-24 @ 07:19) (62 - 77)  BP: 117/55 (07-31-24 @ 07:19) (103/65 - 139/71)  RR: 18 (07-31-24 @ 07:19) (18 - 18)  SpO2: 99% (07-31-24 @ 07:19) (98% - 99%)    PHYSICAL EXAM  GENERAL  ( x ) NAD, lying in bed comfortably     (  ) obtunded     (  ) lethargic     (  ) somnolent    HEAD  ( x ) Atraumatic     (  ) hematoma     (  ) laceration (specify location:       )     NECK  ( x ) Supple     (  ) neck stiffness     (  ) nuchal rigidity     (  )  no JVD     (  ) JVD present ( -- cm)    HEART  Rate -->  ( x ) normal rate    (  ) bradycardic    (  ) tachycardic  Rhythm -->  (x  ) regular    (  ) regularly irregular    (  ) irregularly irregular  Murmurs -->  (  ) normal s1/s2    (  ) systolic murmur    (  ) diastolic murmur    (  ) continuous murmur     (  ) S3 present    (  ) S4 present    LUNGS  ( x )Unlabored respirations     (  ) tachypnea  (  x) B/L air entry     (  ) decreased breath sounds in:  (location     )    (  ) no adventitious sound     (  ) crackles     (  ) wheezing      (  ) rhonchi      (specify location:       )  (  ) chest wall tenderness (specify location:       )    ABDOMEN  (x  ) Soft     (  ) tense   |   (  ) nondistended     (  ) distended   |   (  ) +BS     (  ) hypoactive bowel sounds     (  ) hyperactive bowel sounds  (  ) nontender     (  ) RUQ tenderness     (  ) RLQ tenderness     (  ) LLQ tenderness     (  ) epigastric tenderness     (  ) diffuse tenderness  (  ) Splenomegaly      (  ) Hepatomegaly      (  ) Jaundice     (  ) ecchymosis     EXTREMITIES  ( x ) Normal     (  ) Rash     (  ) ecchymosis     (  ) varicose veins      ( x ) pitting edema     (  ) non-pitting edema   (  ) ulceration     (  ) gangrene:     (location:     )    NERVOUS SYSTEM  ( x ) A&Ox3     (  ) confused     (  ) lethargic  CN II-XII:     (  ) Intact     (  ) focal deficits  (Specify:     )   Upper extremities:     (  ) strength X/5     (  ) focal deficit (specify:    )  Lower extremities:     (  ) strength  X/5    (  ) focal deficit (specify:    )    SKIN  ( x ) No rashes or lesions     (  ) maculopapular rash     (  ) pustules     (  ) vesicles     (  ) ulcer     (  ) ecchymosis     (specify location:     )    LABS             7.5    6.89  )-----------( 123      ( 07-31-24 @ 06:10 )             24.7     139  |  107  |  101  -------------------------<  143   07-30-24 @ 18:52  4.2  |  20  |  3.6    Ca      7.4     07-30-24 @ 18:52  Mg     2.1     07-30-24 @ 18:52    TPro  4.4  /  Alb  2.8  /  TBili  0.7  /  DBili  x   /  AST  64  /  ALT  27  /  AlkPhos  70  /  GGT  x     07-30-24 @ 18:52    Urinalysis Basic - ( 30 Jul 2024 18:52 )    Color: x / Appearance: x / SG: x / pH: x  Gluc: 143 mg/dL / Ketone: x  / Bili: x / Urobili: x   Blood: x / Protein: x / Nitrite: x   Leuk Esterase: x / RBC: x / WBC x   Sq Epi: x / Non Sq Epi: x / Bacteria: x    Urinalysis with Rflx Culture (collected 30 Jul 2024 17:58)    IMAGING  US Kidney and Bladder (07.30.24 @ 07:49)   IMPRESSION:    No definite hydronephrosis    Xray Chest 2 Views PA/Lat (07.18.24 @ 16:10)     Impression:    Enlarged cardiac silhouette and small bilateral pleural effusions.

## 2024-07-31 NOTE — PHYSICAL THERAPY INITIAL EVALUATION ADULT - ADDITIONAL COMMENTS
Patient lives with son in house with 3 steps to enter. Patient mostly alone in home at daytime. Patient was independent in ADls and ambulation using no AD.

## 2024-07-31 NOTE — PHYSICAL THERAPY INITIAL EVALUATION ADULT - PERTINENT HX OF CURRENT PROBLEM, REHAB EVAL
Patient is a 78-year-old female with past medical history of   CKD, HTN, CCY, gout,  severe AS being planned for tAVR, and history of severe anemia secondary to chronic upper GI bleeding due to  AV malformation gastric body dieulafoy lesion, weekly blood transfusions presenting to ER for a rupture pressure ulcer of the buttock area today.   Patient states that last night was on her toilet which cracked while she was sitting on it, and patient was unable to get off the toilet for 4 hours.  By the time EMS arrived skin had ulcerated, with a blister that ruptured predominantly to the left buttock area (appears to be like burn blister that ruptured) with linear abrasions to the bilateral upper thighs/buttocks.  Patient in so much pain that she cannot stand or walk, Of note, pt was recently admitted to the hospital between 7/13-7/23 for hematochezia, s/p 1 unit prbc. Denies fever, chills. N/V, abd pain.

## 2024-07-31 NOTE — CONSULT NOTE ADULT - ASSESSMENT
ASSESSMENT   78-year-old female with past medical history of   CKD, HTN, CCY, gout,  severe AS being planned for tAVR, and history of severe anemia secondary to chronic upper GI bleeding due to  AV malformation gastric body dieulafoy lesion, weekly blood transfusions presenting to ER for a rupture pressure ulcer of the buttock area today.      IMPRESSION  #Bilateral Buttock Ulcers/Pressur ulcer  #Severe AS   #CKD  #Anemia secondary to GI Bleed/AV malformation     #Obesity BMI (kg/m2): 35.1, 35.1  #Abx allergy: aspirin (Rash; Other)    RECOMMENDATIONS  - wounds without any gross signs of infection   - stop ceftriaxone   - wound care recommendations     Please call or message on [employer] Teams if with any questions.  Spectra [phone]

## 2024-07-31 NOTE — CONSULT NOTE ADULT - SUBJECTIVE AND OBJECTIVE BOX
LATRICIA ARREAGA  78y, Female  Allergy: aspirin (Rash; Other)  penicillins (Rash; Urticaria; Hives; Blisters)  latex (Unknown)  EKG leads (Hives)      CHIEF COMPLAINT: pressure ulcer rupture (2024 09:03)      LOS  2d    HPI:  HPI : Patient is a 78-year-old female with past medical history of   CKD, HTN, CCY, gout,  severe AS being planned for tAVR, and history of severe anemia secondary to chronic upper GI bleeding due to  AV malformation gastric body dieulafoy lesion, weekly blood transfusions presenting to ER for a rupture pressure ulcer of the buttock area today.   Patient states that last night was on her toilet which cracked while she was sitting on it, and patient was unable to get off the toilet for 4 hours.  By the time EMS arrived skin had ulcerated, with a blister that ruptured predominantly to the left buttock area (appears to be like burn blister that ruptured) with linear abrasions to the bilateral upper thighs/buttocks.  Patient in so much pain that she cannot stand or walk, Of note, pt was recently admitted to the hospital between - for hematochezia, s/p 1 unit prbc. Denies fever, chills. N/V, abd pain.     Vital Signs Last 24 Hrs  T(C): 36.4 (2024 00:51), Max: 37.1 (2024 18:30)  T(F): 97.6 (2024 00:51), Max: 98.7 (2024 18:30)  HR: 65 (2024 00:51) (59 - 65)  BP: 146/69 (2024 00:51) (112/46 - 146/69)  BP(mean): --  RR: 18 (2024 00:51) (18 - 18)  SpO2: 99% (2024 00:51) (95% - 99%)    Parameters below as of 2024 00:51  Patient On (Oxygen Delivery Method): room air    Labs significant for wbc 11k , hgb 9.1 , Cr 3.5 (baseline 2-3)   s/p  1L NS in ED   Admitted for wound management and placement as pt can not care for her own ADLs. (2024 03:46)      INFECTIOUS DISEASE HISTORY:  History as above.  ID consulted for antibiotic management of sacral wound.     PAST MEDICAL & SURGICAL HISTORY:  HTN (hypertension)      Heart murmur      Eczema      Anemia  iron infusions and PRBC transfusions      Aortic stenosis      Chronic kidney disease (CKD)      2019 novel coronavirus disease (COVID-19)  2020- REHAB admission      Gastric AVM      H/O appendicitis      S/P cholecystectomy      History of esophagogastroduodenoscopy (EGD)      H/O colonoscopy      H/O  section      History of appendectomy      Port-A-Cath in place  right CW          FAMILY HISTORY  No pertinent family history in first degree relatives    FH: kidney disease (Father)    FH: breast cancer (Mother)    FH: multiple myeloma (Mother)        SOCIAL HISTORY  Social History:  . retired. Lives in apartment w/ son's family. Able to perform all ADL's and most IADL's independently. Ambulates w/o mechanical assistive device inside home, but use walker/cane for longer distances. (2023 04:29)        ROS  General: Denies rigors, nightsweats  HEENT: Denies headache, rhinorrhea, sore throat, eye pain  CV: Denies CP, palpitations  PULM: Denies wheezing, hemoptysis  GI: Denies hematemesis, hematochezia, melena  : Denies discharge, hematuria  MSK: Denies arthralgias, myalgias  SKIN: Denies rash, lesions  NEURO: Denies paresthesias, weakness  PSYCH: Denies depression, anxiety    VITALS:  T(F): 98.4, Max: 98.8 (24 @ 20:22)  HR: 65  BP: 117/55  RR: 18Vital Signs Last 24 Hrs  T(C): 36.9 (2024 07:19), Max: 37.1 (2024 20:22)  T(F): 98.4 (2024 07:19), Max: 98.8 (2024 20:22)  HR: 65 (2024 07:19) (62 - 77)  BP: 117/55 (2024 07:19) (103/65 - 139/71)  BP(mean): --  RR: 18 (2024 07:19) (18 - 18)  SpO2: 99% (2024 07:19) (98% - 99%)    Parameters below as of 2024 07:19  Patient On (Oxygen Delivery Method): room air        PHYSICAL EXAM:  Gen: NAD, resting in bed  HEENT: Normocephalic, atraumatic  Neck: supple, no lymphadenopathy  CV: Regular rate & regular rhythm  Lungs: decreased BS at bases, no fremitus  Abdomen: Soft, BS present  Ext: Warm, well perfused  Neuro: non focal, awake  Skin: along bilateral buttocks, wounds with deep tissue ulcer/areas of dark purple with epidermal skin loss - no drainage or surrounding erythema   Lines: no phlebitis    TESTS & MEASUREMENTS:                        7.5    6.89  )-----------( 123      ( 2024 06:10 )             24.7         139  |  107  |  101<HH>  ----------------------------<  143<H>  4.2   |  20  |  3.6<H>    Ca    7.4<L>      2024 18:52  Mg     2.1         TPro  4.4<L>  /  Alb  2.8<L>  /  TBili  0.7  /  DBili  x   /  AST  64<H>  /  ALT  27  /  AlkPhos  70        LIVER FUNCTIONS - ( 2024 18:52 )  Alb: 2.8 g/dL / Pro: 4.4 g/dL / ALK PHOS: 70 U/L / ALT: 27 U/L / AST: 64 U/L / GGT: x           Urinalysis Basic - ( 2024 11:00 )    Color: Yellow / Appearance: Clear / S.014 / pH: x  Gluc: x / Ketone: Negative mg/dL  / Bili: Negative / Urobili: 0.2 mg/dL   Blood: x / Protein: Negative mg/dL / Nitrite: Negative   Leuk Esterase: Large / RBC: 0 /HPF /  /HPF   Sq Epi: x / Non Sq Epi: 2 /HPF / Bacteria: Negative /HPF        Urinalysis with Rflx Culture (collected 24 @ 17:58)        Lactate, Blood: 1.6 mmol/L (24 @ 01:09)      INFECTIOUS DISEASES TESTING      RADIOLOGY & ADDITIONAL TESTS:  I have personally reviewed the last Chest xray  CXR      CT      CARDIOLOGY TESTING      MEDICATIONS  cefTRIAXone   IVPB 1000 IV Intermittent every 24 hours  metoprolol tartrate 25 Oral daily  pantoprazole    Tablet 40 Oral before breakfast  silver sulfADIAZINE 1% Cream 1 Topical daily      Weight  Weight (kg): 101.8 (24 @ 17:10)    ANTIBIOTICS:  cefTRIAXone   IVPB 1000 milliGRAM(s) IV Intermittent every 24 hours      ALLERGIES:  aspirin (Rash; Other)  penicillins (Rash; Urticaria; Hives; Blisters)  latex (Unknown)  EKG leads (Hives)

## 2024-07-31 NOTE — CONSULT NOTE ADULT - ASSESSMENT
78-year-old female with past medical history of   CKD, HTN, CCY, gout,  severe AS being planned for TAVR, and history of severe anemia secondary to chronic upper GI bleeding due to  AV malformation gastric body dieulafoy lesion, weekly blood transfusions presenting to ER for a rupture pressure ulcer of the buttock area.    #EVAN on CKD4  -Cr on admission 3.5 (baseline around 2-3)  -likely due to cardio renal syndrome vs variable creatinine baseline  -hold fluids  -resume home lasix  -continue with daily BMP  -patient should follow up with a nephrologist after discharge  78-year-old female with past medical history of   CKD, HTN, CCY, gout,  severe AS being planned for TAVR, and history of severe anemia secondary to chronic upper GI bleeding due to  AV malformation gastric body dieulafoy lesion, weekly blood transfusions presenting to ER for a rupture pressure ulcer of the buttock area.    #EVAN on CKD4  -Cr on admission 3.5 (baseline around 2-3)  -no hydro on renal/bladder sono   -likely due to cardio renal syndrome vs variable creatinine baseline  -hold fluids  -resume home lasix  -continue with daily BMP  -patient should follow up with a nephrologist after discharge

## 2024-07-31 NOTE — PHYSICAL THERAPY INITIAL EVALUATION ADULT - LEVEL OF INDEPENDENCE: SIT/STAND, REHAB EVAL
multiple attempts made. patient unable to lift buttocks off the bed during attempt/unable to perform

## 2024-08-01 ENCOUNTER — RESULT REVIEW (OUTPATIENT)
Age: 79
End: 2024-08-01

## 2024-08-01 LAB
ANION GAP SERPL CALC-SCNC: 13 MMOL/L — SIGNIFICANT CHANGE UP (ref 7–14)
BUN SERPL-MCNC: 94 MG/DL — CRITICAL HIGH (ref 10–20)
CALCIUM SERPL-MCNC: 7.4 MG/DL — LOW (ref 8.4–10.4)
CHLORIDE SERPL-SCNC: 105 MMOL/L — SIGNIFICANT CHANGE UP (ref 98–110)
CK SERPL-CCNC: 439 U/L — HIGH (ref 0–225)
CO2 SERPL-SCNC: 19 MMOL/L — SIGNIFICANT CHANGE UP (ref 17–32)
CREAT SERPL-MCNC: 3.2 MG/DL — HIGH (ref 0.7–1.5)
EGFR: 14 ML/MIN/1.73M2 — LOW
GLUCOSE SERPL-MCNC: 110 MG/DL — HIGH (ref 70–99)
HCT VFR BLD CALC: 23.7 % — LOW (ref 37–47)
HGB BLD-MCNC: 7.1 G/DL — LOW (ref 12–16)
IRON SATN MFR SERPL: 14 % — LOW (ref 15–50)
IRON SATN MFR SERPL: 23 UG/DL — LOW (ref 35–150)
MAGNESIUM SERPL-MCNC: 2.1 MG/DL — SIGNIFICANT CHANGE UP (ref 1.8–2.4)
MCHC RBC-ENTMCNC: 28.7 PG — SIGNIFICANT CHANGE UP (ref 27–31)
MCHC RBC-ENTMCNC: 30 G/DL — LOW (ref 32–37)
MCV RBC AUTO: 96 FL — SIGNIFICANT CHANGE UP (ref 81–99)
NRBC # BLD: 0 /100 WBCS — SIGNIFICANT CHANGE UP (ref 0–0)
PHOSPHATE SERPL-MCNC: 4.1 MG/DL — SIGNIFICANT CHANGE UP (ref 2.1–4.9)
PLATELET # BLD AUTO: 111 K/UL — LOW (ref 130–400)
PMV BLD: 9.9 FL — SIGNIFICANT CHANGE UP (ref 7.4–10.4)
POTASSIUM SERPL-MCNC: 4.4 MMOL/L — SIGNIFICANT CHANGE UP (ref 3.5–5)
POTASSIUM SERPL-SCNC: 4.4 MMOL/L — SIGNIFICANT CHANGE UP (ref 3.5–5)
RBC # BLD: 2.47 M/UL — LOW (ref 4.2–5.4)
RBC # FLD: 18.4 % — HIGH (ref 11.5–14.5)
SODIUM SERPL-SCNC: 137 MMOL/L — SIGNIFICANT CHANGE UP (ref 135–146)
TIBC SERPL-MCNC: 163 UG/DL — LOW (ref 220–430)
UIBC SERPL-MCNC: 140 UG/DL — SIGNIFICANT CHANGE UP (ref 110–370)
WBC # BLD: 6.14 K/UL — SIGNIFICANT CHANGE UP (ref 4.8–10.8)
WBC # FLD AUTO: 6.14 K/UL — SIGNIFICANT CHANGE UP (ref 4.8–10.8)

## 2024-08-01 PROCEDURE — 93306 TTE W/DOPPLER COMPLETE: CPT | Mod: 26

## 2024-08-01 PROCEDURE — 99232 SBSQ HOSP IP/OBS MODERATE 35: CPT

## 2024-08-01 RX ADMIN — Medication 1 APPLICATION(S): at 12:33

## 2024-08-01 RX ADMIN — Medication 40 MILLIGRAM(S): at 05:57

## 2024-08-01 RX ADMIN — METOPROLOL TARTRATE 25 MILLIGRAM(S): 100 TABLET ORAL at 05:52

## 2024-08-01 NOTE — PROGRESS NOTE ADULT - SUBJECTIVE AND OBJECTIVE BOX
SUBJECTIVE:    Patient is a 78y old Female who presents with a chief complaint of pressure ulcer (01 Aug 2024 11:32)    Currently admitted to medicine with the primary diagnosis of Pressure ulcer       Today is hospital day 3d.     PAST MEDICAL & SURGICAL HISTORY  HTN (hypertension)    Heart murmur    Eczema    Anemia  iron infusions and PRBC transfusions    Aortic stenosis    Chronic kidney disease (CKD)     novel coronavirus disease (COVID-19)  2020- REHAB admission    Gastric AVM    H/O appendicitis    S/P cholecystectomy    History of esophagogastroduodenoscopy (EGD)    H/O colonoscopy    H/O  section    History of appendectomy    Port-A-Cath in place  right CW      ALLERGIES:  aspirin (Rash; Other)  penicillins (Rash; Urticaria; Hives; Blisters)  latex (Unknown)  EKG leads (Hives)    MEDICATIONS:  STANDING MEDICATIONS  metoprolol tartrate 25 milliGRAM(s) Oral daily  pantoprazole    Tablet 40 milliGRAM(s) Oral before breakfast  silver sulfADIAZINE 1% Cream 1 Application(s) Topical daily    PRN MEDICATIONS  acetaminophen     Tablet .. 650 milliGRAM(s) Oral every 6 hours PRN    VITALS:   T(F): 98.1  HR: 74  BP: 105/69  RR: 18  SpO2: 98%    LABS:                        7.1    6.14  )-----------( 111      ( 01 Aug 2024 14:30 )             23.7         139  |  107  |  101<HH>  ----------------------------<  143<H>  4.2   |  20  |  3.6<H>    Ca    7.4<L>      2024 18:52  Phos  4.1     08-  Mg     2.1     08-    TPro  4.4<L>  /  Alb  2.8<L>  /  TBili  0.7  /  DBili  x   /  AST  64<H>  /  ALT  27  /  AlkPhos  70        Urinalysis Basic - ( 2024 11:00 )    Color: Yellow / Appearance: Clear / S.014 / pH: x  Gluc: x / Ketone: Negative mg/dL  / Bili: Negative / Urobili: 0.2 mg/dL   Blood: x / Protein: Negative mg/dL / Nitrite: Negative   Leuk Esterase: Large / RBC: 0 /HPF /  /HPF   Sq Epi: x / Non Sq Epi: 2 /HPF / Bacteria: Negative /HPF        Creatine Kinase, Serum: 439 U/L *H* (24 @ 14:29)      Urinalysis with Rflx Culture (collected 2024 17:58)    Culture - Urine (collected 2024 17:58)  Source: Clean Catch None  Final Report (01 Aug 2024 09:23):    >=3 organisms. Probable collection contamination.      CARDIAC MARKERS ( 01 Aug 2024 14:29 )  x     / x     / 439 U/L / x     / x      CARDIAC MARKERS ( 2024 18:52 )  x     / x     / 934 U/L / x     / x          RADIOLOGY:    PHYSICAL EXAM:  GEN: No acute distress  LUNGS: Clear to auscultation bilaterally   HEART: S1/S2 present. RRR.   ABD/ GI: Soft, non-tender, non-distended. Bowel sounds present  EXT: NC/NC/NE/2+PP/VERGARA  NEURO: AAOX3

## 2024-08-01 NOTE — PROGRESS NOTE ADULT - SUBJECTIVE AND OBJECTIVE BOX
SUBJECTIVE/OVERNIGHT EVENTS  Today is hospital day 3d. This morning patient was seen and examined at bedside, resting comfortably in bed. No acute or major events overnight. Pt refused labs, wants labs from port.     MEDICATIONS  STANDING MEDICATIONS  metoprolol tartrate 25 milliGRAM(s) Oral daily  pantoprazole    Tablet 40 milliGRAM(s) Oral before breakfast  silver sulfADIAZINE 1% Cream 1 Application(s) Topical daily    PRN MEDICATIONS  acetaminophen     Tablet .. 650 milliGRAM(s) Oral every 6 hours PRN    VITALS  T(F): 97.5 (24 @ 07:24), Max: 98.2 (24 @ 15:51)  HR: 60 (24 @ 07:24) (60 - 89)  BP: 105/59 (24 @ 07:24) (105/59 - 118/62)  RR: 18 (24 @ 07:24) (18 - 18)  SpO2: 98% (24 @ 07:24) (97% - 98%)    PHYSICAL EXAM  GENERAL  (x  ) NAD, lying in bed comfortably     (  ) obtunded     (  ) lethargic     (  ) somnolent    HEAD  ( x ) Atraumatic     (  ) hematoma     (  ) laceration (specify location:       )     NECK  ( x ) Supple     (  ) neck stiffness     (  ) nuchal rigidity     (  )  no JVD     (  ) JVD present ( -- cm)    HEART  Rate -->  ( x ) normal rate    (  ) bradycardic    (  ) tachycardic  Rhythm -->  ( x ) regular    (  ) regularly irregular    (  ) irregularly irregular  Murmurs -->  (  ) normal s1/s2    (  ) systolic murmur    (  ) diastolic murmur    (  ) continuous murmur     (  ) S3 present    (  ) S4 present    LUNGS  ( x )Unlabored respirations     (  ) tachypnea  ( x ) B/L air entry     (  ) decreased breath sounds in:  (location     )    (  ) no adventitious sound     (  ) crackles     (  ) wheezing      (  ) rhonchi      (specify location:       )  (  ) chest wall tenderness (specify location:       )    ABDOMEN  ( x ) Soft     (  ) tense   |   (  ) nondistended     (  ) distended   |   (  ) +BS     (  ) hypoactive bowel sounds     (  ) hyperactive bowel sounds  (  ) nontender     (  ) RUQ tenderness     (  ) RLQ tenderness     (  ) LLQ tenderness     (  ) epigastric tenderness     (  ) diffuse tenderness  (  ) Splenomegaly      (  ) Hepatomegaly      (  ) Jaundice     (  ) ecchymosis     EXTREMITIES  (x  ) Normal     (  ) Rash     (  ) ecchymosis     (  ) varicose veins      ( x ) pitting edema     (  ) non-pitting edema   (  ) ulceration     (  ) gangrene:     (location:     )    NERVOUS SYSTEM  (x  ) A&Ox3     (  ) confused     (  ) lethargic  CN II-XII:     (  ) Intact     (  ) focal deficits  (Specify:     )   Upper extremities:     (  ) strength X/5     (  ) focal deficit (specify:    )  Lower extremities:     (  ) strength  X/5    (  ) focal deficit (specify:    )    SKIN  ( x ) No rashes or lesions     (  ) maculopapular rash     (  ) pustules     (  ) vesicles     (  ) ulcer     (  ) ecchymosis     (specify location:     )    LABS             7.5    6.89  )-----------( 123      ( 24 @ 06:10 )             24.7     139  |  107  |  101  -------------------------<  143   24 @ 18:52  4.2  |  20  |  3.6    Ca      7.4     24 @ 18:52  Mg     2.1     24 @ 18:52    TPro  4.4  /  Alb  2.8  /  TBili  0.7  /  DBili  x   /  AST  64  /  ALT  27  /  AlkPhos  70  /  GGT  x     24 @ 18:52    Urinalysis Basic - ( 2024 11:00 )    Color: Yellow / Appearance: Clear / S.014 / pH: x  Gluc: x / Ketone: Negative mg/dL  / Bili: Negative / Urobili: 0.2 mg/dL   Blood: x / Protein: Negative mg/dL / Nitrite: Negative   Leuk Esterase: Large / RBC: 0 /HPF /  /HPF   Sq Epi: x / Non Sq Epi: 2 /HPF / Bacteria: Negative /HPF    Urinalysis with Rflx Culture (collected 2024 17:58)    Culture - Urine (collected 2024 17:58)  Source: Clean Catch None  Final Report (01 Aug 2024 09:23):    >=3 organisms. Probable collection contamination.    IMAGING  Xray Chest 1 (24 @ 11:53)   Impression:    Cardiomegaly with unchanged appearance of the thorax.

## 2024-08-01 NOTE — PROGRESS NOTE ADULT - ASSESSMENT
78-year-old female with past medical history of   CKD, HTN, CCY, gout,  severe AS being planned for TAVR, and history of severe anemia secondary to chronic upper GI bleeding due to  AV malformation gastric body dieulafoy lesion, weekly blood transfusions presenting to ER for a rupture pressure ulcer of the buttock area.  #EVAN on CKD4  -Cr on admission 3.5 (baseline around 2-3)  -no hydro on renal/bladder sono   -cr stable   - non oliguric   - no iv fluids / no need for diuretics   - check ph level / corrected calcium 8.6  - follow CK levels   - check iron stores   no acute indication for RRT   will follow

## 2024-08-01 NOTE — PROGRESS NOTE ADULT - SUBJECTIVE AND OBJECTIVE BOX
seen and examined  24 h events noted         PAST HISTORY  --------------------------------------------------------------------------------  No significant changes to PMH, PSH, FHx, SHx, unless otherwise noted    ALLERGIES & MEDICATIONS  --------------------------------------------------------------------------------  Allergies    aspirin (Rash; Other)  penicillins (Rash; Urticaria; Hives; Blisters)  latex (Unknown)  EKG leads (Hives)    Intolerances      Standing Inpatient Medications  metoprolol tartrate 25 milliGRAM(s) Oral daily  pantoprazole    Tablet 40 milliGRAM(s) Oral before breakfast  silver sulfADIAZINE 1% Cream 1 Application(s) Topical daily    PRN Inpatient Medications  acetaminophen     Tablet .. 650 milliGRAM(s) Oral every 6 hours PRN        VITALS/PHYSICAL EXAM  --------------------------------------------------------------------------------  T(C): 36.4 (08-01-24 @ 07:24), Max: 36.8 (07-31-24 @ 15:51)  HR: 60 (08-01-24 @ 07:24) (60 - 89)  BP: 105/59 (08-01-24 @ 07:24) (105/59 - 118/62)  RR: 18 (08-01-24 @ 07:24) (18 - 18)  SpO2: 98% (08-01-24 @ 07:24) (97% - 98%)  Wt(kg): --        07-31-24 @ 07:01  -  08-01-24 @ 07:00  --------------------------------------------------------  IN: 0 mL / OUT: 1200 mL / NET: -1200 mL      Physical Exam:  	Gen: NAD  	Pulm: decrease BS  B/L  	CV:  S1S2; no rub  	Abd: +distended  	  	    LABS/STUDIES  --------------------------------------------------------------------------------              7.5    6.89  >-----------<  123      [07-31-24 @ 06:10]              24.7     139  |  107  |  101  ----------------------------<  143      [07-30-24 @ 18:52]  4.2   |  20  |  3.6        Ca     7.4     [07-30-24 @ 18:52]      Mg     2.1     [07-30-24 @ 18:52]    TPro  4.4  /  Alb  2.8  /  TBili  0.7  /  DBili  x   /  AST  64  /  ALT  27  /  AlkPhos  70  [07-30-24 @ 18:52]              [07-30-24 @ 18:52]    Creatinine Trend:  SCr 3.6 [07-30 @ 18:52]  SCr 3.5 [07-29 @ 22:25]  SCr 2.9 [07-23 @ 10:59]  SCr 2.8 [07-22 @ 07:35]  SCr 3.3 [07-21 @ 14:49]    Urinalysis - [07-31-24 @ 11:00]      Color Yellow / Appearance Clear / SG 1.014 / pH 5.5      Gluc Negative / Ketone Negative  / Bili Negative / Urobili 0.2       Blood Negative / Protein Negative / Leuk Est Large / Nitrite Negative      RBC 0 /  / Hyaline  / Gran  / Sq Epi  / Non Sq Epi 2 / Bacteria Negative    Urine Creatinine 67      [07-31-24 @ 11:00]  Urine Protein 5      [07-31-24 @ 11:00]  Urine Sodium 35.0      [07-31-24 @ 11:00]  Urine Urea Nitrogen 633      [07-31-24 @ 11:00]  Urine Potassium 31      [07-31-24 @ 11:00]  Urine Osmolality 380      [07-31-24 @ 11:00]

## 2024-08-01 NOTE — PROGRESS NOTE ADULT - ASSESSMENT
79 y/o F w/ pmhx of CKD, HTN, CCY, gout, severe AS being planned for TAVR, and history of severe anemia secondary to chronic upper GI bleeding due to AV malformation gastric body dieulafoy lesion, weekly blood transfusions presenting to ER for a rupture pressure ulcer of the buttock area today. Pt was on toilet which cracked while she was sitting on it, pt unable to get off the toilet for 4 hours.  By the time EMS arrived skin had ulcerated, with a blister that ruptured predominantly to the left buttock area (appears to be like burn blister that ruptured) with linear abrasions to the bilateral upper thighs/buttocks.  Patient in so much pain that she cannot stand or walk, Of note, pt was recently admitted to the hospital between 7/13-7/23 for hematochezia, s/p 1 unit prbc. Denies fever, chills. N/V, abd pain.    #Severe pressure injury /ulcer possible cellulitis  - pt was on toilet which cracked while she was sitting on it, pt was unable to get off the toilet for 4 hours  -pain control w/ Tylenol for now   -CK 2766 > Trend  - PT/OT consulted   - ID consulted  - wound care recs noted     #EVAN on CKD stage 4   #rhabdo   -possible ATN vs cardio-renal syndrome  -S/p 1L NS in ED  -UA positive   -RBUS: no definite hydronephrosis   -Cr 3.5 (baseline 2-3)  -CK 2766> gentle hydration (pt with severe AS and Diastolic HF)  - repeat   - per nephro: c/w home lasix 40mg PO daily, hold fluids   - CXR 7/31: cardiomegaly with unchanged appearance of the thorax  - if CK and cr improving restart gentle hydration  - strict I&O's   - start lasix after Cr levels     # History of AVMs (gastric and duodenal) and Dieulafoy Lesions Status Post Hemostasis in 2023  # Chronic normocytic iron deficiency anemia   # History of Perisplenic Varices in 2023   #Hyperbilirubinemia - resolved   - on weekly IV transfusion/iron infusions per hematology  - s/p EGD/colonoscopy/push enteroscopy on 7/22/24 showing AVMs and polyps    - monitor Hgb, keep active T&S, Avoid NSAIDs   - PPI QD  - outpt follow up with GI and hepatology     #Diastolic heart failure   # Severe aortic stenosis being planned for TAVR  - TTE: 03/22/2023- EF 62%,  severe aortic stenosis, mildly increased left ventricular wall thickness, Grade II diastolic dysfunction of left ventricular myocardial filling, mild thickening of the anterior and posterior mitral valve leaflets, mild mitral valve regurgitation, mild tricuspid regurgitation  - c/w Metoprolol  - s/p lasix 40 mg  - CK elevated, f/u repeat   - cardiology outpt, for possible tavr  - repeat echo     DVT ppx: none   GI ppx: protoxin   Diet: DASH  Activity: AAT   Pending: echo, stat labs

## 2024-08-01 NOTE — PROGRESS NOTE ADULT - ASSESSMENT
78-year-old female with past medical history of   CKD, HTN, CCY, gout,  severe AS being planned for TAVR, and history of severe anemia secondary to chronic upper GI bleeding due to  AV malformation gastric body dieulafoy lesion, weekly blood transfusions presenting to ER for a rupture pressure ulcer of the buttock area today. Admitted for wound management and placement as pt can not care for her own ADLs.    # Pressure injury from sitting on toilet for 4 hours  to be seen by burn and wound care   no IV abx as per ID.  DC ceftriaxone    #Laura-- last creat 3.6 rom yesterday no new labs today-- s/p 1L of Iv fluids.  no creatinine today--added on labs -- hold lasix as per nephrology    baseline creat 1.8  US kidneys-- no hydronephrosis   Can hold lasix for AM     # Severe AS pending TAVR-- NO CHF On CXR  no edema or SOB  # Severe Anemia-- hb 7.1 gets weekly transfusions--will do 1 unit of PRBC tomorrow with lasix   AV malformation in GI Tract--hx of GI bleed    # hx of gout-- not on allopurinol.    # Morbid obesity is present.  PT eval   and transfusion in AM

## 2024-08-02 ENCOUNTER — TRANSCRIPTION ENCOUNTER (OUTPATIENT)
Age: 79
End: 2024-08-02

## 2024-08-02 ENCOUNTER — APPOINTMENT (OUTPATIENT)
Age: 79
End: 2024-08-02

## 2024-08-02 LAB
ALBUMIN SERPL ELPH-MCNC: 2.5 G/DL — LOW (ref 3.5–5.2)
ALBUMIN SERPL ELPH-MCNC: 2.7 G/DL — LOW (ref 3.5–5.2)
ALP SERPL-CCNC: 62 U/L — SIGNIFICANT CHANGE UP (ref 30–115)
ALP SERPL-CCNC: 64 U/L — SIGNIFICANT CHANGE UP (ref 30–115)
ALT FLD-CCNC: 20 U/L — SIGNIFICANT CHANGE UP (ref 0–41)
ALT FLD-CCNC: 21 U/L — SIGNIFICANT CHANGE UP (ref 0–41)
ANION GAP SERPL CALC-SCNC: 12 MMOL/L — SIGNIFICANT CHANGE UP (ref 7–14)
ANION GAP SERPL CALC-SCNC: 9 MMOL/L — SIGNIFICANT CHANGE UP (ref 7–14)
AST SERPL-CCNC: 26 U/L — SIGNIFICANT CHANGE UP (ref 0–41)
AST SERPL-CCNC: 27 U/L — SIGNIFICANT CHANGE UP (ref 0–41)
BASOPHILS # BLD AUTO: 0.01 K/UL — SIGNIFICANT CHANGE UP (ref 0–0.2)
BASOPHILS NFR BLD AUTO: 0.2 % — SIGNIFICANT CHANGE UP (ref 0–1)
BILIRUB SERPL-MCNC: 0.5 MG/DL — SIGNIFICANT CHANGE UP (ref 0.2–1.2)
BILIRUB SERPL-MCNC: 0.8 MG/DL — SIGNIFICANT CHANGE UP (ref 0.2–1.2)
BUN SERPL-MCNC: 82 MG/DL — CRITICAL HIGH (ref 10–20)
BUN SERPL-MCNC: 90 MG/DL — CRITICAL HIGH (ref 10–20)
CALCIUM SERPL-MCNC: 7.3 MG/DL — LOW (ref 8.4–10.5)
CALCIUM SERPL-MCNC: 7.5 MG/DL — LOW (ref 8.4–10.5)
CHLORIDE SERPL-SCNC: 107 MMOL/L — SIGNIFICANT CHANGE UP (ref 98–110)
CHLORIDE SERPL-SCNC: 107 MMOL/L — SIGNIFICANT CHANGE UP (ref 98–110)
CO2 SERPL-SCNC: 20 MMOL/L — SIGNIFICANT CHANGE UP (ref 17–32)
CO2 SERPL-SCNC: 21 MMOL/L — SIGNIFICANT CHANGE UP (ref 17–32)
CREAT SERPL-MCNC: 3.1 MG/DL — HIGH (ref 0.7–1.5)
CREAT SERPL-MCNC: 3.2 MG/DL — HIGH (ref 0.7–1.5)
EGFR: 14 ML/MIN/1.73M2 — LOW
EGFR: 15 ML/MIN/1.73M2 — LOW
EOSINOPHIL # BLD AUTO: 0.75 K/UL — HIGH (ref 0–0.7)
EOSINOPHIL NFR BLD AUTO: 12.2 % — HIGH (ref 0–8)
FERRITIN SERPL-MCNC: 170 NG/ML — SIGNIFICANT CHANGE UP (ref 13–330)
GLUCOSE SERPL-MCNC: 119 MG/DL — HIGH (ref 70–99)
GLUCOSE SERPL-MCNC: 121 MG/DL — HIGH (ref 70–99)
HCT VFR BLD CALC: 23.8 % — LOW (ref 37–47)
HCT VFR BLD CALC: 23.9 % — LOW (ref 37–47)
HGB BLD-MCNC: 7.2 G/DL — LOW (ref 12–16)
HGB BLD-MCNC: 7.3 G/DL — LOW (ref 12–16)
IMM GRANULOCYTES NFR BLD AUTO: 0.5 % — HIGH (ref 0.1–0.3)
LYMPHOCYTES # BLD AUTO: 0.45 K/UL — LOW (ref 1.2–3.4)
LYMPHOCYTES # BLD AUTO: 7.3 % — LOW (ref 20.5–51.1)
MAGNESIUM SERPL-MCNC: 2.1 MG/DL — SIGNIFICANT CHANGE UP (ref 1.8–2.4)
MCHC RBC-ENTMCNC: 28.9 PG — SIGNIFICANT CHANGE UP (ref 27–31)
MCHC RBC-ENTMCNC: 29.6 PG — SIGNIFICANT CHANGE UP (ref 27–31)
MCHC RBC-ENTMCNC: 30.3 G/DL — LOW (ref 32–37)
MCHC RBC-ENTMCNC: 30.5 G/DL — LOW (ref 32–37)
MCV RBC AUTO: 95.6 FL — SIGNIFICANT CHANGE UP (ref 81–99)
MCV RBC AUTO: 96.8 FL — SIGNIFICANT CHANGE UP (ref 81–99)
MONOCYTES # BLD AUTO: 0.65 K/UL — HIGH (ref 0.1–0.6)
MONOCYTES NFR BLD AUTO: 10.6 % — HIGH (ref 1.7–9.3)
NEUTROPHILS # BLD AUTO: 4.25 K/UL — SIGNIFICANT CHANGE UP (ref 1.4–6.5)
NEUTROPHILS NFR BLD AUTO: 69.2 % — SIGNIFICANT CHANGE UP (ref 42.2–75.2)
NRBC # BLD: 0 /100 WBCS — SIGNIFICANT CHANGE UP (ref 0–0)
NRBC # BLD: 0 /100 WBCS — SIGNIFICANT CHANGE UP (ref 0–0)
PLATELET # BLD AUTO: 114 K/UL — LOW (ref 130–400)
PLATELET # BLD AUTO: 129 K/UL — LOW (ref 130–400)
PMV BLD: 10.8 FL — HIGH (ref 7.4–10.4)
PMV BLD: 11.2 FL — HIGH (ref 7.4–10.4)
POTASSIUM SERPL-MCNC: 4.4 MMOL/L — SIGNIFICANT CHANGE UP (ref 3.5–5)
POTASSIUM SERPL-MCNC: 4.5 MMOL/L — SIGNIFICANT CHANGE UP (ref 3.5–5)
POTASSIUM SERPL-SCNC: 4.4 MMOL/L — SIGNIFICANT CHANGE UP (ref 3.5–5)
POTASSIUM SERPL-SCNC: 4.5 MMOL/L — SIGNIFICANT CHANGE UP (ref 3.5–5)
PROT SERPL-MCNC: 4.5 G/DL — LOW (ref 6–8)
PROT SERPL-MCNC: 4.7 G/DL — LOW (ref 6–8)
RBC # BLD: 2.47 M/UL — LOW (ref 4.2–5.4)
RBC # BLD: 2.49 M/UL — LOW (ref 4.2–5.4)
RBC # FLD: 18.2 % — HIGH (ref 11.5–14.5)
RBC # FLD: 18.3 % — HIGH (ref 11.5–14.5)
SODIUM SERPL-SCNC: 137 MMOL/L — SIGNIFICANT CHANGE UP (ref 135–146)
SODIUM SERPL-SCNC: 139 MMOL/L — SIGNIFICANT CHANGE UP (ref 135–146)
WBC # BLD: 6.14 K/UL — SIGNIFICANT CHANGE UP (ref 4.8–10.8)
WBC # BLD: 6.95 K/UL — SIGNIFICANT CHANGE UP (ref 4.8–10.8)
WBC # FLD AUTO: 6.14 K/UL — SIGNIFICANT CHANGE UP (ref 4.8–10.8)
WBC # FLD AUTO: 6.95 K/UL — SIGNIFICANT CHANGE UP (ref 4.8–10.8)

## 2024-08-02 PROCEDURE — 99222 1ST HOSP IP/OBS MODERATE 55: CPT

## 2024-08-02 PROCEDURE — 99232 SBSQ HOSP IP/OBS MODERATE 35: CPT

## 2024-08-02 RX ORDER — HEPARIN SODIUM,BOVINE 1000/ML
5000 VIAL (ML) INJECTION ONCE
Refills: 0 | Status: COMPLETED | OUTPATIENT
Start: 2024-08-02 | End: 2024-08-02

## 2024-08-02 RX ORDER — FUROSEMIDE 40 MG
40 TABLET ORAL ONCE
Refills: 0 | Status: COMPLETED | OUTPATIENT
Start: 2024-08-02 | End: 2024-08-02

## 2024-08-02 RX ORDER — SILVER SULFADIAZINE 1 %
1 CREAM (GRAM) TOPICAL
Qty: 1 | Refills: 0
Start: 2024-08-02 | End: 2024-08-16

## 2024-08-02 RX ADMIN — METOPROLOL TARTRATE 25 MILLIGRAM(S): 100 TABLET ORAL at 06:07

## 2024-08-02 RX ADMIN — Medication 5000 UNIT(S): at 10:08

## 2024-08-02 RX ADMIN — Medication 1 APPLICATION(S): at 11:06

## 2024-08-02 RX ADMIN — Medication 40 MILLIGRAM(S): at 06:07

## 2024-08-02 RX ADMIN — Medication 40 MILLIGRAM(S): at 16:46

## 2024-08-02 NOTE — PROGRESS NOTE ADULT - SUBJECTIVE AND OBJECTIVE BOX
SUBJECTIVE:    Patient is a 78y old Female who presents with a chief complaint of pressure ulcer  (02 Aug 2024 11:07)    Currently admitted to medicine with the primary diagnosis of Pressure ulcer       Today is hospital day 4d.     PAST MEDICAL & SURGICAL HISTORY  HTN (hypertension)    Heart murmur    Eczema    Anemia  iron infusions and PRBC transfusions    Aortic stenosis    Chronic kidney disease (CKD)    2019 novel coronavirus disease (COVID-19)  2020- REHAB admission    Gastric AVM    H/O appendicitis    S/P cholecystectomy    History of esophagogastroduodenoscopy (EGD)    H/O colonoscopy    H/O  section    History of appendectomy    Port-A-Cath in place  right CW      ALLERGIES:  aspirin (Rash; Other)  penicillins (Rash; Urticaria; Hives; Blisters)  latex (Unknown)  EKG leads (Hives)    MEDICATIONS:  STANDING MEDICATIONS  furosemide   Injectable 40 milliGRAM(s) IV Push once  metoprolol tartrate 25 milliGRAM(s) Oral daily  pantoprazole    Tablet 40 milliGRAM(s) Oral before breakfast  silver sulfADIAZINE 1% Cream 1 Application(s) Topical daily    PRN MEDICATIONS  acetaminophen     Tablet .. 650 milliGRAM(s) Oral every 6 hours PRN    VITALS:   T(F): 98.3  HR: 74  BP: 121/65  RR: 18  SpO2: 98%    LABS:                        7.3    6.95  )-----------( 129      ( 02 Aug 2024 11:04 )             23.9     08-02    139  |  107  |  90<HH>  ----------------------------<  121<H>  4.5   |  20  |  3.1<H>    Ca    7.3<L>      02 Aug 2024 11:04  Phos  4.1     08-01  Mg     2.1     08-02    TPro  4.5<L>  /  Alb  2.5<L>  /  TBili  0.5  /  DBili  x   /  AST  27  /  ALT  21  /  AlkPhos  64  08-02      Urinalysis Basic - ( 02 Aug 2024 11:04 )    Color: x / Appearance: x / SG: x / pH: x  Gluc: 121 mg/dL / Ketone: x  / Bili: x / Urobili: x   Blood: x / Protein: x / Nitrite: x   Leuk Esterase: x / RBC: x / WBC x   Sq Epi: x / Non Sq Epi: x / Bacteria: x        Creatine Kinase, Serum: 439 U/L *H* (24 @ 14:29)      Urinalysis with Rflx Culture (collected 2024 17:58)    Culture - Urine (collected 2024 17:58)  Source: Clean Catch None  Final Report (01 Aug 2024 09:23):    >=3 organisms. Probable collection contamination.      CARDIAC MARKERS ( 01 Aug 2024 14:29 )  x     / x     / 439 U/L / x     / x          RADIOLOGY:    PHYSICAL EXAM:  GEN: No acute distress  LUNGS: Clear to auscultation bilaterally   HEART: S1/S2 present. RRR.   ABD/ GI: Soft, non-tender, non-distended. Bowel sounds present  EXT: NC/NC/NE/2+PP/VERGARA  NEURO: AAOX3

## 2024-08-02 NOTE — DISCHARGE NOTE PROVIDER - PROVIDER TOKENS
PROVIDER:[TOKEN:[[id number]],FOLLOWUP:[1 week]] PROVIDER:[TOKEN:[[id number]],FOLLOWUP:[1 week]],PROVIDER:[TOKEN:[[id number]],FOLLOWUP:[1 week]]

## 2024-08-02 NOTE — DISCHARGE NOTE PROVIDER - HOSPITAL COURSE
78-year-old female with past medical history of   CKD, HTN, CCY, gout,  severe AS being planned for tAVR, and history of severe anemia secondary to chronic upper GI bleeding due to  AV malformation gastric body dieulafoy lesion, weekly blood transfusions presenting to ER for a rupture pressure ulcer of the buttock area today.   Patient states that last night was on her toilet which cracked while she was sitting on it, and patient was unable to get off the toilet for 4 hours.  By the time EMS arrived skin had ulcerated, with a blister that ruptured predominantly to the left buttock area (appears to be like burn blister that ruptured) with linear abrasions to the bilateral upper thighs/buttocks.  Patient in so much pain that she cannot stand or walk, Of note, pt was recently admitted to the hospital between 7/13-7/23 for hematochezia, s/p 1 unit prbc. Denies fever, chills. N/V, abd pain.     Vital in ED 98.7 HR 65 BP: 146/69  RR: 18 (30 Jul 2024 00:51) (18 - 18)  SpO2: 99% (30 Jul 2024 00:51) (95% - 99%)    Parameters below as of 30 Jul 2024 00:51  Patient On (Oxygen Delivery Method): room air    Labs significant for wbc 11k , hgb 9.1 , Cr 3.5 (baseline 2-3)   s/p  1L NS in ED   Admitted for wound management and placement as pt can not care for her own ADLs.    Pt had a severe pressure injury/ ulcer, possibly complicated by cellulitis. CK level was initially elevated to 2766 but improved to 937 U/L with gentle hydration. Recommendations for wound care have been documented and followed.    Pt also had EVAN on CKD, potentially due to ATN or cardio-renal syndrome. UAwas positive, and RBUS)showed no definitive hydronephrosis. Pt has severe AS) and diastolic HF, was advised by nephro to continue home lasix 40 mg orally daily and to restrict fluid intake.     All other chronic conditions were managed.     Discussion of discharge plan of care, including discharge diagnoses, medication reconciliation, and follow-ups was conducted with Dr. Edwards on 8/2/24, and discharge was approved.     78-year-old female with past medical history of CKD, HTN, CCY, gout, severe AS being planned for tAVR, and history of severe anemia secondary to chronic upper GI bleeding due to  AV malformation gastric body dieulafoy lesion, weekly blood transfusions presented to ER for a rupture pressure ulcer of the buttock area. Patient states that the night prior she was on her toilet which cracked while she was sitting on it, and patient was unable to get off the toilet for 4 hours.  By the time EMS arrived skin had ulcerated, with a blister that ruptured predominantly to the left buttock area (appears to be like burn blister that ruptured) with linear abrasions to the bilateral upper thighs/buttocks.  Patient was in so much pain that she was not able to stand or walk. Of note, pt was recently admitted to the hospital between 7/13-7/23 for hematochezia, s/p 1 unit prbc. Denies fever, chills. N/V, abd pain.     Vital in ED 98.7 HR 65 BP: 146/69  RR: 18 (30 Jul 2024 00:51) (18 - 18)  SpO2: 99% (30 Jul 2024 00:51) (95% - 99%)    Labs were significant for wbc 11k , hgb 9.1 , Cr 3.5 (baseline 2-3)   s/p 1L NS in ED   Pt was admitted for wound management and placement as pt can not care for her own ADLs.    Pt had a severe pressure injury/ ulcer, possibly complicated by cellulitis. CK level was initially elevated to 2766 but improved with gentle hydration. Recommendations for wound care have been documented and followed.    Pt also had EVAN on CKD, potentially due to ATN or cardio-renal syndrome. UAwas positive, and RBUS)showed no definitive hydronephrosis. Pt has severe AS) and diastolic HF, was advised by nephro to continue home lasix 40 mg orally daily and to restrict fluid intake.     All other chronic conditions were managed.     Discussion of discharge plan of care, including discharge diagnoses, medication reconciliation, and follow-ups was conducted with  ______ on 8/__/24, and discharge was approved.     78-year-old female with past medical history of CKD, HTN, CCY, gout, severe AS being planned for tAVR, and history of severe anemia secondary to chronic upper GI bleeding due to  AV malformation gastric body dieulafoy lesion, weekly blood transfusions presented to ER for a rupture pressure ulcer of the buttock area. Patient states that the night prior she was on her toilet which cracked while she was sitting on it, and patient was unable to get off the toilet for 4 hours.  By the time EMS arrived skin had ulcerated, with a blister that ruptured predominantly to the left buttock area (appears to be like burn blister that ruptured) with linear abrasions to the bilateral upper thighs/buttocks.  Patient was in so much pain that she was not able to stand or walk. Of note, pt was recently admitted to the hospital between 7/13-7/23 for hematochezia, s/p 1 unit prbc. Denies fever, chills. N/V, abd pain.     Vital in ED 98.7 HR 65 BP: 146/69  RR: 18 (30 Jul 2024 00:51) (18 - 18)  SpO2: 99% (30 Jul 2024 00:51) (95% - 99%)    Labs were significant for wbc 11k , hgb 9.1 , Cr 3.5 (baseline 2-3)   s/p 1L NS in ED   Pt was admitted for wound management and placement as pt can not care for her own ADLs.    Pt had a severe pressure injury/ ulcer, possibly complicated by cellulitis. CK level was initially elevated to 2766 but improved with gentle hydration.  Wound care evaluated and recommendations were followed. The pt was originally put on ceftriaxone and ID was consulted to r/o infection and then the pt was monitored off of antibiotics per ID recs as there was no clinical signs of infection.     Pt also had EVAN on CKD, potentially due to ATN or cardio-renal syndrome. UAwas positive, and RBUS showed no definitive hydronephrosis. Fluids were given and lasix was held in the setting of EVAN but with improving kidney function and history of severe AS and diastolic HF, fluids were stopped and the patient was continued on her home lasix 40 mg orally daily per nephro.   The patient should follow up with a nephrologist after discharge     The pt also has chronic normocytic anemia with weekly iron infusions outpatient. In the hospital, the pt received 1upRBC and a total of 1g venofer. The pt will get WOODY.   The patient should follow up outpatient with GI and hepatology.      All other chronic conditions were managed.     Discussion of discharge plan of care, including discharge diagnoses, medication reconciliation, and follow-ups was conducted with  ______ on 8/__/24, and discharge was approved.     78-year-old female with past medical history of CKD, HTN, CCY, gout, severe AS being planned for tAVR, and history of severe anemia secondary to chronic upper GI bleeding due to  AV malformation gastric body dieulafoy lesion, weekly blood transfusions presented to ER for a rupture pressure ulcer of the buttock area. Patient states that the night prior she was on her toilet which cracked while she was sitting on it, and patient was unable to get off the toilet for 4 hours.  By the time EMS arrived skin had ulcerated, with a blister that ruptured predominantly to the left buttock area (appears to be like burn blister that ruptured) with linear abrasions to the bilateral upper thighs/buttocks.  Patient was in so much pain that she was not able to stand or walk. Of note, pt was recently admitted to the hospital between 7/13-7/23 for hematochezia, s/p 1 unit prbc. Denies fever, chills. N/V, abd pain.     Vital in ED 98.7 HR 65 BP: 146/69  RR: 18 (30 Jul 2024 00:51) (18 - 18)  SpO2: 99% (30 Jul 2024 00:51) (95% - 99%)    Labs were significant for wbc 11k , hgb 9.1 , Cr 3.5 (baseline 2-3)   s/p 1L NS in ED   Pt was admitted for wound management and placement as pt can not care for her own ADLs.    Pt had a severe pressure injury/ ulcer, possibly complicated by cellulitis. CK level was initially elevated to 2766 but improved with gentle hydration.  Wound care evaluated and recommendations were followed. The pt was originally put on ceftriaxone and ID was consulted to r/o infection and then the pt was monitored off of antibiotics per ID recs as there was no clinical signs of infection.     Pt also had EVAN on CKD, potentially due to ATN or cardio-renal syndrome. UA was positive, and RBUS showed no definitive hydronephrosis. Fluids were given and lasix was held in the setting of EVAN but with improving kidney function and history of severe AS and diastolic HF, fluids were stopped and the patient was continued on her home lasix 40 mg orally daily per nephro.   The patient should follow up with a nephrologist after discharge     The pt also has chronic normocytic anemia with weekly iron infusions outpatient. In the hospital, the pt received 1upRBC and a total of 1g venofer. The pt will get WOODY.   The patient should follow up outpatient with GI and hepatology.      All other chronic conditions were managed.     Discussion of discharge plan of care, including discharge diagnoses, medication reconciliation completed 78-year-old female with past medical history of   CKD, HTN, CCY, gout,  severe AS being planned for tAVR, and history of severe anemia secondary to chronic upper GI bleeding due to  AV malformation gastric body dieulafoy lesion, weekly blood transfusions presenting to ER for a rupture pressure ulcer of the buttock area. Admitted for metabolic encephalopathy 2/2 uremia vs hyperammonemia, started on HD.  Mental status improved. now currently monitoring off HD.        1.Metabolic encephalopathy - resolved  due to  Acute kidney injury on CKD stage 4  * Hyperammonemia - per GI, not hepatic encephalopathy   - mental status improved now, no need for brain MRI  - currently monitoring off HD, [place] removed   - on Lactulose   - Was on  Bumex 1 mg Q 12 hours (hold for hypotension) . DC on bumex 1mg po bid     2. Sacrum: pressure ulcer with necrosis/ localized abscess/cellulitis  - 8/14 s/p bedside debridement of bilateral buttocks by Dr Schwarz   - s/p debridement in OR by BURN 8/23 - cleared by BURN for discharge   - continue local wound care bid: wash wounds with soap and water, apply hydrogel, cover with Vashe moist gauze, then apply ABD and tape.  - pain management - Tylenol ATC, Dilaudid PRN   - offloading, position changes  - no abx per ID   - wound culture growing multiple organisms - likely colonizers, needs isolation for MDR   - c/w Arora     3. Acute on chronic  anemia/ History of AVMs (gastric and duodenal) and Dieulafoy Lesions Status Post Hemostasis in 2023/ History of perisplenic Varices in 2023   # DARRIN due to blood loss   - s/p EGD/colonoscopy/push enteroscopy on 7/22 showing AVMs and polyps  - s/p pRBC on 8/2, 8/11, 8/16, and today 8/27   - s/p course of Venofer  - on weekly IV transfusion/iron infusions per hematology. 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 DC on PPI and carafate     4. Asymptomatic bacteruria with ESBL Enterobacter with Arora catheter  - s/p 6 days of meropenem     5. Prolonged QTc - resolving   - repeat EKG daily   - K above 4, mg above 2.2   - avoid QTc prolonging meds     6.  Chronic diastolic CHF/ Severe aortic stenosis   - TTE Echo Complete w/o Contrast w/ Doppler (08.01.24 @ 11:03) >EF of 56 %. Mild asymmetric left ventricular hypertrophy. Grade II diastolic dysfunction). Severe aortic valve stenosis. Mild to moderate mitral valve regurgitation. Moderate aortic regurgitation.  - Patient to follow up with Dr. Hernandez as an outpatient for TAVR  - c/w midodrine 10 q8H  - hold metoprolol for hypotension   - Follow up structural heart disease for severe AS       Discussion of discharge plan of care, including discharge diagnoses, medication reconciliation completed 78-year-old female with past medical history of   CKD, HTN, CCY, gout,  severe AS being planned for tAVR, and history of severe anemia secondary to chronic upper GI bleeding due to  AV malformation gastric body dieulafoy lesion, weekly blood transfusions presenting to ER for a rupture pressure ulcer of the buttock area. Admitted for metabolic encephalopathy 2/2 uremia vs hyperammonemia, started on HD.  Mental status improved. now currently monitoring off HD.        1.Metabolic encephalopathy - resolved  due to  Acute kidney injury on CKD stage 4  * Hyperammonemia - per GI, not hepatic encephalopathy   - mental status improved now, no need for brain MRI  - currently monitoring off HD, [place] removed   - on Lactulose   - Was on  Bumex 1 mg Q 12 hours. DC on bumex 1mg po bid     2. Sacrum: pressure ulcer with necrosis/ localized abscess/cellulitis  - 8/14 s/p bedside debridement of bilateral buttocks by Dr Schwarz   - s/p debridement in OR by BURN 8/23 - cleared by BURN for discharge   - continue local wound care bid: wash wounds with soap and water, apply hydrogel, cover with Vashe moist gauze, then apply ABD and tape.  - pain management - Tylenol ATC, Dilaudid PRN   - offloading, position changes  - no abx per ID   - wound culture growing multiple organisms - likely colonizers, needs isolation for MDR   - c/w Arora     3. Acute on chronic  anemia/ History of AVMs (gastric and duodenal) and Dieulafoy Lesions Status Post Hemostasis in 2023/ History of perisplenic Varices in 2023   # DARRIN due to blood loss   - s/p EGD/colonoscopy/push enteroscopy on 7/22 showing AVMs and polyps  - s/p pRBC on 8/2, 8/11, 8/16, and today 8/27   - s/p course of Venofer  - on weekly IV transfusion/iron infusions per hematology. 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 DC on PPI and carafate     4. Asymptomatic bacteruria with ESBL Enterobacter with Arora catheter  - s/p 6 days of meropenem     5. Prolonged QTc - resolving   - repeat EKG daily   - K above 4, mg above 2.2   - avoid QTc prolonging meds     6.  Chronic diastolic CHF/ Severe aortic stenosis   - TTE Echo Complete w/o Contrast w/ Doppler (08.01.24 @ 11:03) >EF of 56 %. Mild asymmetric left ventricular hypertrophy. Grade II diastolic dysfunction). Severe aortic valve stenosis. Mild to moderate mitral valve regurgitation. Moderate aortic regurgitation.  - Patient to follow up with Dr. Hernandez as an outpatient for TAVR  - c/w midodrine 10 q8H  - hold metoprolol for hypotension   - Follow up structural heart disease for severe AS       Discussion of discharge plan of care, including discharge diagnoses, medication reconciliation completed

## 2024-08-02 NOTE — DISCHARGE NOTE PROVIDER - NSDCFUSCHEDAPPT_GEN_ALL_CORE_FT
Liu Gaston  [facility]mits  Scheduled Appointment: 08/02/2024    [place] [address] [address]  Scheduled Appointment: 08/02/2024    Liu Gaston  [facility]  Scheduled Appointment: 08/09/2024    [place] [address] [address]  Scheduled Appointment: 08/09/2024    [place] [address] [address]  Scheduled Appointment: 08/16/2024    Liu Gaston  [facility]  Scheduled Appointment: 08/16/2024    Liu Gaston  [facility]  Scheduled Appointment: 08/23/2024    [place] [address] [address]  Scheduled Appointment: 08/23/2024    [place] [address] [address]  Scheduled Appointment: 08/30/2024    Liu Gaston  [facility]  Scheduled Appointment: 08/30/2024     Liu Gaston  [facility]  Scheduled Appointment: 08/09/2024    [place] [address] [address]  Scheduled Appointment: 08/09/2024    [place] [address] [address]  Scheduled Appointment: 08/16/2024    Liu Gaston  [facility] Pre[facility]mits  Scheduled Appointment: 08/16/2024    Liu Gaston  [facility]  Scheduled Appointment: 08/23/2024    [place] [address] [address]  Scheduled Appointment: 08/23/2024    [place] [address] [address]  Scheduled Appointment: 08/30/2024    Liu Gaston  [facility]  Scheduled Appointment: 08/30/2024     Liu Gaston  [facility]mits  Scheduled Appointment: 08/30/2024    [facility]  [facility] Fani BHAKTA  Scheduled Appointment: 08/30/2024

## 2024-08-02 NOTE — DISCHARGE NOTE PROVIDER - ATTENDING DISCHARGE PHYSICAL EXAMINATION:
VITALS:   T(C): 36.2 (08-28-24 @ 07:30), Max: 38 (08-27-24 @ 21:56)  HR: 70 (08-28-24 @ 07:30) (70 - 75)  BP: 131/66 (08-28-24 @ 07:30) (114/67 - 131/66)  RR: 18 (08-28-24 @ 07:30) (18 - 18)  SpO2: 96% (08-28-24 @ 07:30) (95% - 96%)    GENERAL: NAD, lying in bed comfortably    HEART: Regular rate and rhythm, murmur noted   LUNGS: Unlabored respirations.  Clear to auscultation bilaterally,   ABDOMEN: Soft, nontender, nondistended, +BS  Pressure ulcer noted

## 2024-08-02 NOTE — DISCHARGE NOTE PROVIDER - CARE PROVIDERS DIRECT ADDRESSES
[email or web address]

## 2024-08-02 NOTE — PROGRESS NOTE ADULT - ASSESSMENT
78-year-old female with past medical history of   CKD, HTN, CCY, gout,  severe AS being planned for TAVR, and history of severe anemia secondary to chronic upper GI bleeding due to  AV malformation gastric body dieulafoy lesion, weekly blood transfusions presenting to ER for a rupture pressure ulcer of the buttock area today. Admitted for wound management and placement as pt can not care for her own ADLs.    # Pressure injury from sitting on toilet for 4 hours  to be seen by burn and wound care   no IV abx as per ID.  DC ceftriaxone    #Laura-- last creat 3.6 ---->3.1 -- s/p 1L of Iv fluids.  -- hold lasix as per nephrology  baseline creat 1.8  US kidneys-- no hydronephrosis   lasix after transfusion today    # Severe AS pending TAVR-- NO CHF On CXR  no edema or SOB  # Severe Anemia-- hb 7.3 gets weekly transfusions--will do 1 unit of PRBC today with lasix   AV malformation in GI Tract--hx of GI bleed    # hx of gout-- not on allopurinol.    # Morbid obesity is present.  PT eval   and transfusion today and DC enid

## 2024-08-02 NOTE — DISCHARGE NOTE PROVIDER - CARE PROVIDER_API CALL
Gildardo Bill  Internal Medicine  [address] [place] [id number]  Phone: [phone]  Fax: [phone]  Follow Up Time: 1 week   Gildardo Bill  Internal Medicine  [address] [place] [address]  Phone: [phone]  Fax: [phone]  Follow Up Time: 1 week    Reji Hernandez  Interventional Cardiology  [address] [place] [address], [address]  [place] [address]  Phone: [phone]  Fax: [phone]  Follow Up Time: 1 week

## 2024-08-02 NOTE — PROGRESS NOTE ADULT - SUBJECTIVE AND OBJECTIVE BOX
Nephrology progress note    Patient was seen and examined, events over the last 24 h noted .    Allergies:  aspirin (Rash; Other)  penicillins (Rash; Urticaria; Hives; Blisters)  latex (Unknown)  EKG leads (Hives)    Hospital Medications:   MEDICATIONS  (STANDING):  furosemide   Injectable 40 milliGRAM(s) IV Push once  metoprolol tartrate 25 milliGRAM(s) Oral daily  pantoprazole    Tablet 40 milliGRAM(s) Oral before breakfast  silver sulfADIAZINE 1% Cream 1 Application(s) Topical daily        VITALS:  T(F): 97.9 (08-02-24 @ 07:40), Max: 98.3 (08-01-24 @ 23:44)  HR: 71 (08-02-24 @ 07:40)  BP: 93/53 (08-02-24 @ 07:40)  RR: 18 (08-02-24 @ 07:40)  SpO2: 97% (08-02-24 @ 07:40)  Wt(kg): --    07-31 @ 07:01  -  08-01 @ 07:00  --------------------------------------------------------  IN: 0 mL / OUT: 1200 mL / NET: -1200 mL    08-01 @ 07:01  -  08-02 @ 07:00  --------------------------------------------------------  IN: 720 mL / OUT: 900 mL / NET: -180 mL    08-02 @ 07:01  -  08-02 @ 13:30  --------------------------------------------------------  IN: 720 mL / OUT: 300 mL / NET: 420 mL          PHYSICAL EXAM:  Constitutional: NAD  HEENT: anicteric sclera, oropharynx clear, MMM  Neck: No JVD  Respiratory: CTAB, no wheezes, rales or rhonchi  Cardiovascular: S1, S2, RRR  Gastrointestinal: BS+, soft, NT/ND  Extremities: No cyanosis or clubbing. No peripheral edema  :  No culver.   Skin: No rashes    LABS:  08-02    x   |  x   |  x   ----------------------------<  121<H>  x    |  20  |  3.1<H>    Ca    7.3<L>      02 Aug 2024 11:04  Phos  4.1     08-01  Mg     2.1     08-02    TPro  4.5<L>  /  Alb      /  TBili  0.5  /  DBili      /  AST  27  /  ALT      /  AlkPhos      08-02                          7.3    6.95  )-----------( 129      ( 02 Aug 2024 11:04 )             23.9       Urine Studies:  Urinalysis Basic - ( 02 Aug 2024 11:04 )    Color:  / Appearance:  / SG:  / pH:   Gluc: 121 mg/dL / Ketone:   / Bili:  / Urobili:    Blood:  / Protein:  / Nitrite:    Leuk Esterase:  / RBC:  / WBC    Sq Epi:  / Non Sq Epi:  / Bacteria:       Sodium, Random Urine: 35.0 mmoL/L (07-31 @ 11:00)  Creatinine, Random Urine: 67 mg/dL (07-31 @ 11:00)  Protein/Creatinine Ratio Calculation: 0.1 Ratio (07-31 @ 11:00)  Osmolality, Random Urine: 380 mos/kg (07-31 @ 11:00)  Potassium, Random Urine: 31 mmol/L (07-31 @ 11:00)    RADIOLOGY & ADDITIONAL STUDIES:

## 2024-08-02 NOTE — DISCHARGE NOTE PROVIDER - NSDCMRMEDTOKEN_GEN_ALL_CORE_FT
furosemide 40 mg oral tablet: 1 tab(s) orally once a day  metoprolol tartrate 25 mg oral tablet: 1 tab(s) orally once a day  pantoprazole 40 mg oral delayed release tablet: 1 tab(s) orally 2 times a day  silver sulfADIAZINE 1% topical cream: Apply topically to affected area 2 times a day 1 Apply topically to affected area once a day   folic acid 1 mg oral tablet: 1 tab(s) orally once a day  furosemide 40 mg oral tablet: 1 tab(s) orally once a day  midodrine 10 mg oral tablet: 1 tab(s) orally 3 times a day  pantoprazole 40 mg oral delayed release tablet: 1 tab(s) orally 2 times a day  polyethylene glycol 3350 oral powder for reconstitution: 17 gram(s) orally once a day  senna leaf extract oral tablet: 2 tab(s) orally once a day (at bedtime)   folic acid 1 mg oral tablet: 1 tab(s) orally once a day  furosemide 40 mg oral tablet: 1 tab(s) orally once a day  lactulose 10 g/15 mL oral syrup: 30 milliliter(s) orally once a day  midodrine 10 mg oral tablet: 1 tab(s) orally 3 times a day  pantoprazole 40 mg oral delayed release tablet: 1 tab(s) orally 2 times a day  sucralfate 1 g oral tablet: 1 tab(s) orally every 12 hours   bumetanide 1 mg oral tablet: 1 tab(s) orally 2 times a day  ferrous sulfate (as elemental iron) 45 mg oral tablet, extended release: 1 tab(s) orally once a day  folic acid 1 mg oral tablet: 1 tab(s) orally once a day  lactulose 10 g/15 mL oral syrup: 30 milliliter(s) orally once a day  midodrine 10 mg oral tablet: 1 tab(s) orally 3 times a day  pantoprazole 40 mg oral delayed release tablet: 1 tab(s) orally 2 times a day  sucralfate 1 g oral tablet: 1 tab(s) orally every 12 hours

## 2024-08-02 NOTE — PROGRESS NOTE ADULT - ASSESSMENT
78-year-old female with past medical history of   CKD, HTN, CCY, gout,  severe AS being planned for TAVR, and history of severe anemia secondary to chronic upper GI bleeding due to  AV malformation gastric body dieulafoy lesion, weekly blood transfusions presenting to ER for a rupture pressure ulcer of the buttock area.  #EVAN on CKD4  -Cr on admission 3.5 (baseline around 2-3)  -no hydro on renal/bladder sono   -cr stable , at baseline  - non oliguric   - no iv fluids / no need for diuretics   - Phos 4.1 at goal    no acute indication for RRT       dc planning noted

## 2024-08-02 NOTE — DISCHARGE NOTE PROVIDER - NSDCCPCAREPLAN_GEN_ALL_CORE_FT
PRINCIPAL DISCHARGE DIAGNOSIS  Diagnosis: Pressure ulcer  Assessment and Plan of Treatment: Pressure ulcers can happen when you lie or sit in one position too long and the weight of your body against the surface of the bed or chair cuts off blood supply. You might get them if you're on bed rest or in a wheelchair.  Your doctor may talk about the "stage" of your pressure sores. The stages are based on how deep the sores are, which can affect how they're treated.  If found early, there's a good chance these sores will heal in a few days, with little fuss or pain. Without treatment, they can get worse.  You were seen by wound care and given recommendations on how to care for the ulcer. You should follow up with your primary care doctor in 1 week.      SECONDARY DISCHARGE DIAGNOSES  Diagnosis: Renal failure, acute on chronic  Assessment and Plan of Treatment: You were noted to have a temporary insult to your kidney function either at the time that you arrived at the hospital or during your stay here. We have monitored your kidney function with blood work during your time here and you are at a level that no longer requires continued hospital level care. We do recommend that you follow up to continually have your kidney function checked. You can follow up with your primary care doctor, or, if recommended in the discharge paperwork, you should follow up with a kidney specialist called a nephrologist.

## 2024-08-03 LAB
ALBUMIN SERPL ELPH-MCNC: 2.5 G/DL — LOW (ref 3.5–5.2)
ALP SERPL-CCNC: 64 U/L — SIGNIFICANT CHANGE UP (ref 30–115)
ALT FLD-CCNC: 20 U/L — SIGNIFICANT CHANGE UP (ref 0–41)
ANION GAP SERPL CALC-SCNC: 10 MMOL/L — SIGNIFICANT CHANGE UP (ref 7–14)
AST SERPL-CCNC: 22 U/L — SIGNIFICANT CHANGE UP (ref 0–41)
BASOPHILS # BLD AUTO: 0.01 K/UL — SIGNIFICANT CHANGE UP (ref 0–0.2)
BASOPHILS NFR BLD AUTO: 0.2 % — SIGNIFICANT CHANGE UP (ref 0–1)
BILIRUB SERPL-MCNC: 0.7 MG/DL — SIGNIFICANT CHANGE UP (ref 0.2–1.2)
BUN SERPL-MCNC: 80 MG/DL — CRITICAL HIGH (ref 10–20)
CALCIUM SERPL-MCNC: 7.4 MG/DL — LOW (ref 8.4–10.5)
CHLORIDE SERPL-SCNC: 104 MMOL/L — SIGNIFICANT CHANGE UP (ref 98–110)
CO2 SERPL-SCNC: 23 MMOL/L — SIGNIFICANT CHANGE UP (ref 17–32)
CREAT SERPL-MCNC: 3 MG/DL — HIGH (ref 0.7–1.5)
EGFR: 15 ML/MIN/1.73M2 — LOW
EOSINOPHIL # BLD AUTO: 0.65 K/UL — SIGNIFICANT CHANGE UP (ref 0–0.7)
EOSINOPHIL NFR BLD AUTO: 11.9 % — HIGH (ref 0–8)
GLUCOSE SERPL-MCNC: 123 MG/DL — HIGH (ref 70–99)
HCT VFR BLD CALC: 25.5 % — LOW (ref 37–47)
HGB BLD-MCNC: 7.9 G/DL — LOW (ref 12–16)
IMM GRANULOCYTES NFR BLD AUTO: 0.5 % — HIGH (ref 0.1–0.3)
LYMPHOCYTES # BLD AUTO: 0.41 K/UL — LOW (ref 1.2–3.4)
LYMPHOCYTES # BLD AUTO: 7.5 % — LOW (ref 20.5–51.1)
MAGNESIUM SERPL-MCNC: 2.1 MG/DL — SIGNIFICANT CHANGE UP (ref 1.8–2.4)
MCHC RBC-ENTMCNC: 29.7 PG — SIGNIFICANT CHANGE UP (ref 27–31)
MCHC RBC-ENTMCNC: 31 G/DL — LOW (ref 32–37)
MCV RBC AUTO: 95.9 FL — SIGNIFICANT CHANGE UP (ref 81–99)
MONOCYTES # BLD AUTO: 0.62 K/UL — HIGH (ref 0.1–0.6)
MONOCYTES NFR BLD AUTO: 11.3 % — HIGH (ref 1.7–9.3)
NEUTROPHILS # BLD AUTO: 3.76 K/UL — SIGNIFICANT CHANGE UP (ref 1.4–6.5)
NEUTROPHILS NFR BLD AUTO: 68.6 % — SIGNIFICANT CHANGE UP (ref 42.2–75.2)
NRBC # BLD: 0 /100 WBCS — SIGNIFICANT CHANGE UP (ref 0–0)
PLATELET # BLD AUTO: 107 K/UL — LOW (ref 130–400)
PMV BLD: 10.5 FL — HIGH (ref 7.4–10.4)
POTASSIUM SERPL-MCNC: 4.5 MMOL/L — SIGNIFICANT CHANGE UP (ref 3.5–5)
POTASSIUM SERPL-SCNC: 4.5 MMOL/L — SIGNIFICANT CHANGE UP (ref 3.5–5)
PROT SERPL-MCNC: 4.3 G/DL — LOW (ref 6–8)
RBC # BLD: 2.66 M/UL — LOW (ref 4.2–5.4)
RBC # FLD: 18 % — HIGH (ref 11.5–14.5)
SODIUM SERPL-SCNC: 137 MMOL/L — SIGNIFICANT CHANGE UP (ref 135–146)
WBC # BLD: 5.48 K/UL — SIGNIFICANT CHANGE UP (ref 4.8–10.8)
WBC # FLD AUTO: 5.48 K/UL — SIGNIFICANT CHANGE UP (ref 4.8–10.8)

## 2024-08-03 PROCEDURE — 99232 SBSQ HOSP IP/OBS MODERATE 35: CPT

## 2024-08-03 RX ORDER — GABAPENTIN 100 MG
100 CAPSULE ORAL EVERY 8 HOURS
Refills: 0 | Status: DISCONTINUED | OUTPATIENT
Start: 2024-08-03 | End: 2024-08-06

## 2024-08-03 RX ORDER — IRON SUCROSE 20 MG/ML
200 INJECTION, SOLUTION INTRAVENOUS EVERY 24 HOURS
Refills: 0 | Status: DISCONTINUED | OUTPATIENT
Start: 2024-08-03 | End: 2024-08-03

## 2024-08-03 RX ORDER — GABAPENTIN 100 MG
1 CAPSULE ORAL
Qty: 0 | Refills: 0 | DISCHARGE
Start: 2024-08-03

## 2024-08-03 RX ORDER — IRON SUCROSE 20 MG/ML
200 INJECTION, SOLUTION INTRAVENOUS EVERY 24 HOURS
Refills: 0 | Status: COMPLETED | OUTPATIENT
Start: 2024-08-03 | End: 2024-08-07

## 2024-08-03 RX ADMIN — Medication 40 MILLIGRAM(S): at 05:07

## 2024-08-03 RX ADMIN — METOPROLOL TARTRATE 25 MILLIGRAM(S): 100 TABLET ORAL at 05:07

## 2024-08-03 RX ADMIN — Medication 100 MILLIGRAM(S): at 09:51

## 2024-08-03 RX ADMIN — Medication 1 APPLICATION(S): at 11:20

## 2024-08-03 RX ADMIN — Medication 100 MILLIGRAM(S): at 21:30

## 2024-08-03 RX ADMIN — IRON SUCROSE 110 MILLIGRAM(S): 20 INJECTION, SOLUTION INTRAVENOUS at 11:06

## 2024-08-03 RX ADMIN — Medication 100 MILLIGRAM(S): at 17:18

## 2024-08-03 NOTE — PROGRESS NOTE ADULT - ASSESSMENT
Patient is a 78-year-old female with past medical history of   CKD, HTN, CCY, gout,  severe AS being planned for tAVR, and history of severe anemia secondary to chronic upper GI bleeding due to  AV malformation gastric body dieulafoy lesion, weekly blood transfusions presenting to ER for a rupture pressure ulcer of the buttock area today.   Patient states that last night was on her toilet which cracked while she was sitting on it, and patient was unable to get off the toilet for 4 hours.  By the time EMS arrived skin had ulcerated, with a blister that ruptured predominantly to the left buttock area (appears to be like burn blister that ruptured) with linear abrasions to the bilateral upper thighs/buttocks.  Patient in so much pain that she cannot stand or walk,    # Bilateral buttocks and sacrum deep Tissue Injury  - Cleanse wound to bilateral buttocks and sacrum with Vashe wound cleanser. Pat dry, apply Silvadene, cover with Xeroform and abdominal pad twice a day and prn for soiling.     # Acute kidney inury on CKD stage 4, prerenal  # Rhabdomylosis- resolving  - Creatine Kinase, Serum: 439 U/L (08.01.24 @ 14:29)  - US Kidney and Bladder (07.30.24 @ 07:49) >No definite hydronephrosi  - S/p IV fluids  - lasix held  - monitor UO  - monitor BMP    # Peripheral neuropathy  - Started on neurontin  - checK B12, folate   - evaluated by PT : STR    # Acute on chronic  anemia  # Non erosive gastritis  # Severe diverticulosis  # Grade/Stage I external hemorrhoids  # H/o  AVMs  # Colon polyps  # History of perisplenic Varices in 2023   -  EGD-Colonoscopy (07.22.24 @ 12:30) >Erythema in the stomach compatible with non-erosive gastritis. 2 non-bleeding 7mm AVMs were seen at the second portion of the duodenum cauterization was successfully applied to ensure  hemostasis. Polyps (5 mm) in the ascending colon. (Polypectomy). Polyps (10 mm) in the ascending colon. (Polypectomy). Polyp (10 mm) in the ascending colon. (Polypectomy, Endoclip). 3 non-bleeding AVMs ranging in size between 7-10mm were seen in the cecum.Using APC probe, cauterization was successfully performed to ensure hemostasis. 3 non-bleeding AVMs ranging in size between 7-105 mm were seen in theascending colon. Using APC probe, cauterization was successfully performed to ensure hemostasis. Severe diverticulosis of the the left side of the colon. Grade/Stage I external hemorrhoids.  - c/w protonix  - s/p 1U pRBC on 8/2   -  IV Venofer     # Chronic diastolic CHF  # Severe aortic stenosis   -  TTE Echo Complete w/o Contrast w/ Doppler (08.01.24 @ 11:03) >EF of 56 %. Mild asymmetric left ventricular hypertrophy. Grade II diastolic dysfunction). Severe aortic valve stenosis , Mild to moderate mitral valve regurgitation. Moderate aortic regurgitation.  - Patient to follow up with Dr. Hernandez as an outpatient for TAVR  - c/w metoprolol    # Thrombocytopenia  - monotor platelets    # DVT prophylaxis    # Full code    # Pending: monitor BMP, xray chest in AM  # Disposition: Home vs STR       Performed Resulted

## 2024-08-03 NOTE — PROGRESS NOTE ADULT - ASSESSMENT
78-year-old female with past medical history of   CKD, HTN, CCY, gout,  severe AS being planned for TAVR, and history of severe anemia secondary to chronic upper GI bleeding due to  AV malformation gastric body dieulafoy lesion, weekly blood transfusions presenting to ER for a rupture pressure ulcer of the buttock area.  #EVAN on CKD4  -Cr on admission 3.5 (baseline around 2-3)  -no hydro on renal/bladder sono   -cr stable , at baseline  - non oliguric   - no iv fluids / no need for diuretics   - Phos 4.1 at goal  - will need a venofer course if did not receive yet/ after that will start WOODY   no acute indication for RRT

## 2024-08-03 NOTE — PROGRESS NOTE ADULT - ASSESSMENT
77 y/o F w/ pmhx of CKD, HTN, CCY, gout, severe AS being planned for TAVR, and history of severe anemia secondary to chronic upper GI bleeding due to AV malformation gastric body dieulafoy lesion, weekly blood transfusions presenting to ER for a rupture pressure ulcer of the buttock area after being unable to get up from her toilet for 4 hours   hours.  By the time EMS arrived skin had ulcerated, with a blister that ruptured predominantly to the left buttock area (appears to be like burn blister that ruptured) with linear abrasions to the bilateral upper thighs/buttocks.  Patient in so much pain that she cannot stand or walk, Of note, pt was recently admitted to the hospital between 7/13-7/23 for hematochezia, s/p 1 unit prbc. Denies fever, chills. N/V, abd pain.    #Severe pressure injury /ulcer possible cellulitis  - pt was on toilet which cracked while she was sitting on it, pt was unable to get off the toilet for 4 hours  -pain control w/ Tylenol for now   -CK 2766 > Trend  - PT/OT consulted   - ID consulted  - wound care recs noted     #EVAN on CKD stage 4   #rhabdo   -possible ATN vs cardio-renal syndrome  -S/p 1L NS in ED  -UA positive   -RBUS: no definite hydronephrosis   -Cr 3.5 (baseline 2-3)  -CK 2766> gentle hydration (pt with severe AS and Diastolic HF)  - repeat   - per nephro: c/w home lasix 40mg PO daily, hold fluids   - CXR 7/31: cardiomegaly with unchanged appearance of the thorax  - if CK and cr improving restart gentle hydration  - strict I&O's   - start lasix after Cr levels     # History of AVMs (gastric and duodenal) and Dieulafoy Lesions Status Post Hemostasis in 2023  # Chronic normocytic iron deficiency anemia   # History of Perisplenic Varices in 2023   #Hyperbilirubinemia - resolved   - on weekly IV transfusion/iron infusions per hematology  - s/p EGD/colonoscopy/push enteroscopy on 7/22/24 showing AVMs and polyps    - monitor Hgb, keep active T&S, Avoid NSAIDs   - PPI QD  - outpt follow up with GI and hepatology     #Diastolic heart failure   # Severe aortic stenosis being planned for TAVR  - TTE: 03/22/2023- EF 62%,  severe aortic stenosis, mildly increased left ventricular wall thickness, Grade II diastolic dysfunction of left ventricular myocardial filling, mild thickening of the anterior and posterior mitral valve leaflets, mild mitral valve regurgitation, mild tricuspid regurgitation  - c/w Metoprolol  - s/p lasix 40 mg  - CK elevated, f/u repeat   - cardiology outpt, for possible tavr  - repeat echo     DVT ppx: none   GI ppx: protoxin   Diet: DASH  Activity: AAT   Pending: echo, stat labs 77 y/o F w/ pmhx of CKD, HTN, CCY, gout, severe AS being planned for TAVR, and history of severe anemia secondary to chronic upper GI bleeding due to AV malformation gastric body dieulafoy lesion, weekly blood transfusions presenting to ER for a rupture pressure ulcer of the buttock area after being unable to get up from her toilet for 4 hours. Of note, pt was recently admitted to the hospital between 7/13-7/23 for hematochezia, s/p 1 unit prbc. Denies fever, chills. N/V, abd pain.    #Severe pressure injury /ulcer possible cellulitis - improving   - pt was on toilet which cracked while she was sitting on it, pt was unable to get off the toilet for 4 hours  -pain control w/ Tylenol for now   -CK 2766 > Trend  - PT/OT consulted   - ID consulted  - wound care recs noted     #EVAN on CKD 4  #rhabdo   -possible ATN vs cardio-renal syndrome  -S/p 1L NS in ED  -UA positive   -RBUS: no definite hydronephrosis   -Cr 3.5 (baseline 2-3)  -CK 2766> repeat   - strict I&O's   - c/w holding Lasix as Cr is not at baseline     #peripheral neuropathy   - pt unable to walk w/ PT due to pain/numbness   - start Gabapentin 100mg q8h   - PT/OT following     # History of AVMs (gastric and duodenal) and Dieulafoy Lesions Status Post Hemostasis in 2023  # Chronic normocytic iron deficiency anemia   # History of Perisplenic Varices in 2023   # Hyperbilirubinemia - resolved   - on weekly IV transfusion/iron infusions per hematology  - s/p EGD/colonoscopy/push enteroscopy on 7/22/24 showing AVMs and polyps    - monitor Hgb, keep active T&S, Avoid NSAIDs   - PPI QD  - outpt follow up with GI and hepatology   - s/p 1U pRBC on 8/2   - start IV Venofer     #Diastolic heart failure   # Severe aortic stenosis being planned for TAVR  - TTE: 03/22/2023- EF 62%,  severe aortic stenosis, mildly increased left ventricular wall thickness, Grade II diastolic dysfunction of left ventricular myocardial filling, mild thickening of the anterior and posterior mitral valve leaflets, mild mitral valve regurgitation, mild tricuspid regurgitation  - c/w Metoprolol  - s/p lasix 40 mg  - cardiology outpt, for possible tavr  - repeat echo     DVT ppx: none   GI ppx: protoxin   Diet: DASH  Activity: AAT   Pending: echo

## 2024-08-03 NOTE — PROGRESS NOTE ADULT - SUBJECTIVE AND OBJECTIVE BOX
Patient is a 78y old  Female who presents with a chief complaint of pressure ulcer (03 Aug 2024 11:12)    Patient was seen and examined.  C/o lower ext pain  Denies any another complaints  All systems reviewed    PAST MEDICAL & SURGICAL HISTORY:  HTN (hypertension)  Heart murmur  Eczema  Anemia  Aortic stenosis  Chronic kidney disease (CKD)  2019 novel coronavirus disease (COVID-19)  Gastric AVM  H/O appendicitis  S/P cholecystectomy  History of esophagogastroduodenoscopy (EGD)  H/O colonoscopy  H/O  section  History of appendectomy  Port-A-Cath in place right CW    Allergies  aspirin (Rash; Other)  penicillins (Rash; Urticaria; Hives; Blisters)  latex (Unknown)  EKG leads (Hives)    MEDICATIONS  (STANDING):  gabapentin 100 milliGRAM(s) Oral every 8 hours  iron sucrose IVPB 200 milliGRAM(s) IV Intermittent every 24 hours  metoprolol tartrate 25 milliGRAM(s) Oral daily  pantoprazole    Tablet 40 milliGRAM(s) Oral before breakfast  silver sulfADIAZINE 1% Cream 1 Application(s) Topical daily    MEDICATIONS  (PRN):  acetaminophen     Tablet .. 650 milliGRAM(s) Oral every 6 hours PRN Mild Pain (1 - 3)    Vital Signs Last 24 Hrs  T(C): 36.6  T(F): 97.9  HR: 76  BP: 104/50  BP(mean): --  RR: 18  SpO2: 98%    O/E:  Awake, alert, not in distress.  HEENT: atraumatic, EOMI.  Chest: decrease breath sounds at bases  CVS: SIS2 +, systolic  murmur+  P/A: Soft, BS+  CNS: awake, alert  Ext: no edema feet.  Skin: Bilateral buttocks and sacrum deep Tissue Injury  All systems reviewed positive findings as above.                        7.9<L>  5.48  )-----------( 107<L>    ( 03 Aug 2024 10:18 )             25.5<L>                        7.3<L>  6.95  )-----------( 129<L>    ( 02 Aug 2024 11:04 )             23.9<L>    08-03    137  |  104  |  80<HH>  ----------------------------<  123<H>  4.5   |  23  |  3.0<H>  08-    139  |  107  |  90<HH>  ----------------------------<  121<H>  4.5   |  20  |  3.1<H>    Ca    7.4<L>      03 Aug 2024 10:19  Ca    7.3<L>      02 Aug 2024 11:04  Ca    7.5<L>      02 Aug 2024 10:20  Ca    7.4<L>      01 Aug 2024 18:45  Phos  4.1     08-01  Mg     2.1     08-03    TPro  4.3<L>  /  Alb  2.5<L>  /  TBili  0.7  /  DBili  x   /  AST  22  /  ALT  20  /  AlkPhos  64  08-03  TPro  4.5<L>  /  Alb  2.5<L>  /  TBili  0.5  /  DBili  x   /  AST  27  /  ALT  21  /  AlkPhos  64  08-02  TPro  4.7<L>  /  Alb  2.7<L>  /  TBili  0.8  /  DBili  x   /  AST  26  /  ALT  20  /  AlkPhos  62  08-02      CARDIAC MARKERS ( 01 Aug 2024 14:29 )  x     / x     / 439 U/L<H> / x     / x            Urinalysis Basic - ( 03 Aug 2024 10:19 )    Color: x / Appearance: x / SG: x / pH: x  Gluc: 123 mg/dL / Ketone: x  / Bili: x / Urobili: x   Blood: x / Protein: x / Nitrite: x   Leuk Esterase: x / RBC: x / WBC x   Sq Epi: x / Non Sq Epi: x / Bacteria: x

## 2024-08-03 NOTE — PROGRESS NOTE ADULT - SUBJECTIVE AND OBJECTIVE BOX
SUBJECTIVE/OVERNIGHT EVENTS  Today is hospital day 5d. This morning patient was seen and examined at bedside, resting comfortably in bed. No acute or major events overnight.    MEDICATIONS  STANDING MEDICATIONS  gabapentin 100 milliGRAM(s) Oral every 8 hours  iron sucrose IVPB 200 milliGRAM(s) IV Intermittent every 24 hours  metoprolol tartrate 25 milliGRAM(s) Oral daily  pantoprazole    Tablet 40 milliGRAM(s) Oral before breakfast  silver sulfADIAZINE 1% Cream 1 Application(s) Topical daily    PRN MEDICATIONS  acetaminophen     Tablet .. 650 milliGRAM(s) Oral every 6 hours PRN    VITALS  T(F): 97.9 (08-03-24 @ 07:49), Max: 98.3 (08-02-24 @ 13:35)  HR: 76 (08-03-24 @ 07:49) (73 - 79)  BP: 104/50 (08-03-24 @ 07:49) (104/50 - 122/60)  RR: 18 (08-03-24 @ 07:49) (18 - 18)  SpO2: 98% (08-03-24 @ 07:49) (97% - 98%)    PHYSICAL EXAM  GENERAL  ( x ) NAD, lying in bed comfortably     (  ) obtunded     (  ) lethargic     (  ) somnolent    HEAD  ( x ) Atraumatic     (  ) hematoma     (  ) laceration (specify location:       )     NECK  ( x ) Supple     (  ) neck stiffness     (  ) nuchal rigidity     (  )  no JVD     (  ) JVD present ( -- cm)    HEART  Rate -->  (x  ) normal rate    (  ) bradycardic    (  ) tachycardic  Rhythm -->  (x  ) regular    (  ) regularly irregular    (  ) irregularly irregular  Murmurs -->  (  ) normal s1/s2    (  ) systolic murmur    (  ) diastolic murmur    (  ) continuous murmur     (  ) S3 present    (  ) S4 present    LUNGS  ( x )Unlabored respirations     (  ) tachypnea  ( x ) B/L air entry     (  ) decreased breath sounds in:  (location     )    (  ) no adventitious sound     (  ) crackles     (  ) wheezing      (  ) rhonchi      (specify location:       )  (  ) chest wall tenderness (specify location:       )    ABDOMEN  ( x ) Soft     (  ) tense   |   (  ) nondistended     (  ) distended   |   (  ) +BS     (  ) hypoactive bowel sounds     (  ) hyperactive bowel sounds  (  ) nontender     (  ) RUQ tenderness     (  ) RLQ tenderness     (  ) LLQ tenderness     (  ) epigastric tenderness     (  ) diffuse tenderness  (  ) Splenomegaly      (  ) Hepatomegaly      (  ) Jaundice     (  ) ecchymosis     EXTREMITIES  ( x ) b/l peripheral neuropathy (  ) Rash     (  ) ecchymosis     (  ) varicose veins      (  ) pitting edema     (  ) non-pitting edema   (  ) ulceration     (  ) gangrene:     (location:     )    NERVOUS SYSTEM  ( x ) A&Ox3     (  ) confused     (  ) lethargic  CN II-XII:     (  ) Intact     (  ) focal deficits  (Specify:     )   Upper extremities:     (  ) strength X/5     (  ) focal deficit (specify:    )  Lower extremities:     (  ) strength  X/5    (  ) focal deficit (specify:    )    SKIN  ( x ) ruptured ulcer to buttock and thigh    (  ) maculopapular rash     (  ) pustules     (  ) vesicles     (  ) ulcer     (  ) ecchymosis     (specify location:     )    LABS             7.9    5.48  )-----------( 107      ( 08-03-24 @ 10:18 )             25.5     137  |  104  |  80  -------------------------<  123   08-03-24 @ 10:19  4.5  |  23  |  3.0    Ca      7.4     08-03-24 @ 10:19  Phos   4.1     08-01-24 @ 14:29  Mg     2.1     08-03-24 @ 10:19    TPro  4.3  /  Alb  2.5  /  TBili  0.7  /  DBili  x   /  AST  22  /  ALT  20  /  AlkPhos  64  /  GGT  x     08-03-24 @ 10:19    Urinalysis Basic - ( 03 Aug 2024 10:19 )    Color: x / Appearance: x / SG: x / pH: x  Gluc: 123 mg/dL / Ketone: x  / Bili: x / Urobili: x   Blood: x / Protein: x / Nitrite: x   Leuk Esterase: x / RBC: x / WBC x   Sq Epi: x / Non Sq Epi: x / Bacteria: x    IMAGING  Xray Chest  (07.31.24 @ 11:53)   Impression:    Cardiomegaly with unchanged appearance of the thorax.    US Kidney and Bladder (07.30.24 @ 07:49)   IMPRESSION:    No definite hydronephrosis

## 2024-08-03 NOTE — PROGRESS NOTE ADULT - SUBJECTIVE AND OBJECTIVE BOX
seen and examined  24 h events noted   no distress         PAST HISTORY  --------------------------------------------------------------------------------  No significant changes to PMH, PSH, FHx, SHx, unless otherwise noted    ALLERGIES & MEDICATIONS  --------------------------------------------------------------------------------  Allergies    aspirin (Rash; Other)  penicillins (Rash; Urticaria; Hives; Blisters)  latex (Unknown)  EKG leads (Hives)    Intolerances      Standing Inpatient Medications  metoprolol tartrate 25 milliGRAM(s) Oral daily  pantoprazole    Tablet 40 milliGRAM(s) Oral before breakfast  silver sulfADIAZINE 1% Cream 1 Application(s) Topical daily    PRN Inpatient Medications  acetaminophen     Tablet .. 650 milliGRAM(s) Oral every 6 hours PRN        VITALS/PHYSICAL EXAM  --------------------------------------------------------------------------------  T(C): 36.6 (08-03-24 @ 07:49), Max: 36.8 (08-02-24 @ 13:15)  HR: 76 (08-03-24 @ 07:49) (73 - 79)  BP: 104/50 (08-03-24 @ 07:49) (104/50 - 122/60)  RR: 18 (08-03-24 @ 07:49) (18 - 18)  SpO2: 98% (08-03-24 @ 07:49) (97% - 98%)  Wt(kg): --        08-02-24 @ 07:01  -  08-03-24 @ 07:00  --------------------------------------------------------  IN: 1220 mL / OUT: 1000 mL / NET: 220 mL      Physical Exam:  	Gen: NAD  	Pulm: decrease BS  B/L  	CV: S1S2; no rub  	Abd:distended  	LE:  no edema      LABS/STUDIES  --------------------------------------------------------------------------------              7.3    6.95  >-----------<  129      [08-02-24 @ 11:04]              23.9     139  |  107  |  90  ----------------------------<  121      [08-02-24 @ 11:04]  4.5   |  20  |  3.1        Ca     7.3     [08-02-24 @ 11:04]      Mg     2.1     [08-02-24 @ 10:20]      Phos  4.1     [08-01-24 @ 14:29]    TPro  4.5  /  Alb  2.5  /  TBili  0.5  /  DBili  x   /  AST  27  /  ALT  21  /  AlkPhos  64  [08-02-24 @ 11:04]            [08-01-24 @ 14:29]    Creatinine Trend:  SCr 3.1 [08-02 @ 11:04]  SCr 3.2 [08-02 @ 10:20]  SCr 3.2 [08-01 @ 18:45]  SCr 3.6 [07-30 @ 18:52]  SCr 3.5 [07-29 @ 22:25]    Urinalysis - [08-02-24 @ 11:04]      Color  / Appearance  / SG  / pH       Gluc 121 / Ketone   / Bili  / Urobili        Blood  / Protein  / Leuk Est  / Nitrite       RBC  / WBC  / Hyaline  / Gran  / Sq Epi  / Non Sq Epi  / Bacteria     Urine Creatinine 67      [07-31-24 @ 11:00]  Urine Protein 5      [07-31-24 @ 11:00]  Urine Sodium 35.0      [07-31-24 @ 11:00]  Urine Urea Nitrogen 633      [07-31-24 @ 11:00]  Urine Potassium 31      [07-31-24 @ 11:00]  Urine Osmolality 380      [07-31-24 @ 11:00]    Iron 23, TIBC 163, %sat 14      [08-01-24 @ 14:29]  Ferritin 170      [08-01-24 @ 14:29]

## 2024-08-04 PROCEDURE — 99232 SBSQ HOSP IP/OBS MODERATE 35: CPT

## 2024-08-04 RX ADMIN — METOPROLOL TARTRATE 25 MILLIGRAM(S): 100 TABLET ORAL at 05:37

## 2024-08-04 RX ADMIN — Medication 40 MILLIGRAM(S): at 05:37

## 2024-08-04 RX ADMIN — IRON SUCROSE 110 MILLIGRAM(S): 20 INJECTION, SOLUTION INTRAVENOUS at 11:05

## 2024-08-04 RX ADMIN — Medication 1 APPLICATION(S): at 11:07

## 2024-08-04 RX ADMIN — Medication 100 MILLIGRAM(S): at 05:36

## 2024-08-04 RX ADMIN — Medication 100 MILLIGRAM(S): at 21:12

## 2024-08-04 RX ADMIN — Medication 100 MILLIGRAM(S): at 14:14

## 2024-08-04 NOTE — PROGRESS NOTE ADULT - ASSESSMENT
78-year-old female with past medical history of   CKD, HTN, CCY, gout,  severe AS being planned for TAVR, and history of severe anemia secondary to chronic upper GI bleeding due to  AV malformation gastric body dieulafoy lesion, weekly blood transfusions presenting to ER for a rupture pressure ulcer of the buttock area.  #EVAN on CKD4  - creat improved to baseline / off diuretics  - no hydro on renal/bladder sono   - non oliguric   - no iv fluids / no need for diuretics   - Phos 4.1 at goal  - complete course of venofer; will need WOODY once completed  no indication for RRT

## 2024-08-04 NOTE — PROGRESS NOTE ADULT - SUBJECTIVE AND OBJECTIVE BOX
Nephrology Progress Note    LATRICIA ARREAGA  MRN-[id number]  78y  Female    S:  Patient is seen and examined, events over the last 24h noted.    O:  Allergies:  aspirin (Rash; Other)  penicillins (Rash; Urticaria; Hives; Blisters)  latex (Unknown)  EKG leads (Hives)    Hospital Medications:   MEDICATIONS  (STANDING):  gabapentin 100 milliGRAM(s) Oral every 8 hours  iron sucrose IVPB 200 milliGRAM(s) IV Intermittent every 24 hours  metoprolol tartrate 25 milliGRAM(s) Oral daily  pantoprazole    Tablet 40 milliGRAM(s) Oral before breakfast  silver sulfADIAZINE 1% Cream 1 Application(s) Topical daily    MEDICATIONS  (PRN):  acetaminophen     Tablet .. 650 milliGRAM(s) Oral every 6 hours PRN Mild Pain (1 - 3)    Home Medications:  furosemide 40 mg oral tablet: 1 tab(s) orally once a day (30 Jul 2024 04:41)  gabapentin 100 mg oral capsule: 1 cap(s) orally every 8 hours (03 Aug 2024 09:30)  metoprolol tartrate 25 mg oral tablet: 1 tab(s) orally once a day (30 Jul 2024 04:41)      VITALS:  T(F): 98.6 (08-04-24 @ 08:20), Max: 98.6 (08-04-24 @ 08:20)  HR: 73 (08-04-24 @ 08:27)  BP: 102/53 (08-04-24 @ 08:27)  RR: 18 (08-03-24 @ 15:00)  SpO2: 98% (08-03-24 @ 15:00)  Wt(kg): --  I&O's Detail    03 Aug 2024 07:01  -  04 Aug 2024 07:00  --------------------------------------------------------  IN:    Oral Fluid: 240 mL  Total IN: 240 mL    OUT:    Voided (mL): 1300 mL  Total OUT: 1300 mL    Total NET: -1060 mL        I&O's Summary    03 Aug 2024 07:01  -  04 Aug 2024 07:00  --------------------------------------------------------  IN: 240 mL / OUT: 1300 mL / NET: -1060 mL          PHYSICAL EXAM:  Gen: NAD  Chest: b/l breath sounds  Abd: soft  Extremities: edema      LABS:      08-03    137  |  104  |  80<HH>  ----------------------------<  123<H>  4.5   |  23  |  3.0<H>    Ca    7.4<L>      03 Aug 2024 10:19  Mg     2.1     08-03    TPro  4.3<L>  /  Alb  2.5<L>  /  TBili  0.7  /  DBili      /  AST  22  /  ALT  20  /  AlkPhos  64  08-03      Phosphorus: 4.1 mg/dL (08-01-24 @ 14:29)                          7.9    5.48  )-----------( 107      ( 03 Aug 2024 10:18 )             25.5     Mean Cell Volume: 95.9 fL (08-03-24 @ 10:18)    Creatinine trend:  Creatinine: 3.0 mg/dL (08-03-24 @ 10:19)  Creatinine: 3.1 mg/dL (08-02-24 @ 11:04)  Creatinine: 3.2 mg/dL (08-02-24 @ 10:20)  Creatinine: 3.2 mg/dL (08-01-24 @ 18:45)  Creatinine: 3.6 mg/dL (07-30-24 @ 18:52)

## 2024-08-04 NOTE — PROGRESS NOTE ADULT - ASSESSMENT
Patient is a 78-year-old female with past medical history of   CKD, HTN, CCY, gout,  severe AS being planned for tAVR, and history of severe anemia secondary to chronic upper GI bleeding due to  AV malformation gastric body dieulafoy lesion, weekly blood transfusions presenting to ER for a rupture pressure ulcer of the buttock area today.   Patient states that last night was on her toilet which cracked while she was sitting on it, and patient was unable to get off the toilet for 4 hours.  By the time EMS arrived skin had ulcerated, with a blister that ruptured predominantly to the left buttock area (appears to be like burn blister that ruptured) with linear abrasions to the bilateral upper thighs/buttocks.  Patient in so much pain that she cannot stand or walk,    # Bilateral buttocks and sacrum deep Tissue Injury  - Cleanse wound to bilateral buttocks and sacrum with Vashe wound cleanser. Pat dry, apply Silvadene, cover with Xeroform and abdominal pad twice a day and prn for soiling.     # Acute kidney inury on CKD stage 4, prerenal  # Rhabdomylosis- resolved  - Creatine Kinase, Serum: 439 U/L (08.01.24 @ 14:29)  - US Kidney and Bladder (07.30.24 @ 07:49) >No definite hydronephrosi  - S/p IV fluids  - lasix held  - monitor UO  - monitor BMP    # Peripheral neuropathy  - c/w  neurontin  - F/u  B12, folate   - evaluated by PT : STR    # Acute on chronic  anemia  # Non erosive gastritis  # Severe diverticulosis  # Grade/Stage I external hemorrhoids  # H/o  AVMs  # Colon polyps  # History of perisplenic Varices in 2023   -  EGD-Colonoscopy (07.22.24 @ 12:30) >Erythema in the stomach compatible with non-erosive gastritis. 2 non-bleeding 7mm AVMs were seen at the second portion of the duodenum cauterization was successfully applied to ensurehemostasis. Polyps (5 mm) in the ascending colon. (Polypectomy). Polyps (10 mm) in the ascending colon. (Polypectomy). Polyp (10 mm) in the ascending colon. (Polypectomy, Endoclip). 3 non-bleeding AVMs ranging in size between 7-10mm were seen in the cecum.Using APC probe, cauterization was successfully performed to ensure hemostasis. 3 non-bleeding AVMs ranging in size between 7-105 mm were seen in theascending colon. Using APC probe, cauterization was successfully performed to ensure hemostasis. Severe diverticulosis of the the left side of the colon. Grade/Stage I external hemorrhoids.  - c/w protonix  - s/p 1U pRBC on 8/2   -  IV Venofer     # Chronic diastolic CHF  # Severe aortic stenosis   -  TTE Echo Complete w/o Contrast w/ Doppler (08.01.24 @ 11:03) >EF of 56 %. Mild asymmetric left ventricular hypertrophy. Grade II diastolic dysfunction). Severe aortic valve stenosis , Mild to moderate mitral valve regurgitation. Moderate aortic regurgitation.  - Patient to follow up with Dr. Hernandez as an outpatient for TAVR  - c/w metoprolol    # Thrombocytopenia  - monitor platelets    # DVT prophylaxis    # Full code    # Pending: monitor BMP, xray chest in AM  # Disposition: Home vs STR

## 2024-08-04 NOTE — PROGRESS NOTE ADULT - SUBJECTIVE AND OBJECTIVE BOX
Patient is a 78y old  Female who presents with a chief complaint of pressure ulcer (03 Aug 2024 11:12)    Patient was seen and examined.  no new complaints  All systems reviewed    PAST MEDICAL & SURGICAL HISTORY:  HTN (hypertension)  Heart murmur  Eczema  Anemia  Aortic stenosis  Chronic kidney disease (CKD)  2019 novel coronavirus disease (COVID-19)  Gastric AVM  H/O appendicitis  S/P cholecystectomy  History of esophagogastroduodenoscopy (EGD)  H/O colonoscopy  H/O  section  History of appendectomy  Port-A-Cath in place right CW    Allergies  aspirin (Rash; Other)  penicillins (Rash; Urticaria; Hives; Blisters)  latex (Unknown)  EKG leads (Hives)    MEDICATIONS  (STANDING):  gabapentin 100 milliGRAM(s) Oral every 8 hours  iron sucrose IVPB 200 milliGRAM(s) IV Intermittent every 24 hours  metoprolol tartrate 25 milliGRAM(s) Oral daily  pantoprazole    Tablet 40 milliGRAM(s) Oral before breakfast  silver sulfADIAZINE 1% Cream 1 Application(s) Topical daily    MEDICATIONS  (PRN):  acetaminophen     Tablet .. 650 milliGRAM(s) Oral every 6 hours PRN Mild Pain (1 - 3)    T(C): 37 (24 @ 08:20), Max: 37 (24 @ 08:20)  HR: 73 (24 @ 08:27) (62 - 79)  BP: 102/53 (24 @ 08:27) (102/53 - 111/66)  RR: 18 (24 @ 15:00) (18 - 18)  SpO2: 98% (24 @ 15:00) (98% - 98%)    O/E:  Awake, alert, not in distress.  HEENT: atraumatic, EOMI.  Chest: decrease breath sounds at bases  CVS: SIS2 +, systolic  murmur+  P/A: Soft, BS+  CNS: awake, alert  Ext: no edema feet.  Skin: Bilateral buttocks and sacrum deep Tissue Injury  All systems reviewed positive findings as above.                                           7.9<L>  5.48  )-----------( 107<L>    ( 03 Aug 2024 10:18 )             25.5<L>  08    137  |  104  |  80<HH>  ----------------------------<  123<H>  4.5   |  23  |  3.0<H>    Ca    7.4<L>      03 Aug 2024 10:19  Mg     2.1         TPro  4.3<L>  /  Alb  2.5<L>  /  TBili  0.7  /  DBili  x   /  AST  22  /  ALT  20  /  AlkPhos  64

## 2024-08-05 PROCEDURE — 99232 SBSQ HOSP IP/OBS MODERATE 35: CPT

## 2024-08-05 RX ORDER — FUROSEMIDE 40 MG
40 TABLET ORAL DAILY
Refills: 0 | Status: ACTIVE | OUTPATIENT
Start: 2024-08-05 | End: 2025-07-04

## 2024-08-05 RX ORDER — POLYETHYLENE GLYCOL 3350 17 G/17G
17 POWDER, FOR SOLUTION ORAL DAILY
Refills: 0 | Status: DISCONTINUED | OUTPATIENT
Start: 2024-08-05 | End: 2024-08-23

## 2024-08-05 RX ORDER — SENNA 187 MG
2 TABLET ORAL AT BEDTIME
Refills: 0 | Status: DISCONTINUED | OUTPATIENT
Start: 2024-08-05 | End: 2024-08-23

## 2024-08-05 RX ORDER — HEPARIN SODIUM,BOVINE 1000/ML
5000 VIAL (ML) INJECTION EVERY 12 HOURS
Refills: 0 | Status: DISCONTINUED | OUTPATIENT
Start: 2024-08-05 | End: 2024-08-23

## 2024-08-05 RX ADMIN — Medication 100 MILLIGRAM(S): at 21:15

## 2024-08-05 RX ADMIN — METOPROLOL TARTRATE 25 MILLIGRAM(S): 100 TABLET ORAL at 05:21

## 2024-08-05 RX ADMIN — POLYETHYLENE GLYCOL 3350 17 GRAM(S): 17 POWDER, FOR SOLUTION ORAL at 21:30

## 2024-08-05 RX ADMIN — Medication 5000 UNIT(S): at 17:19

## 2024-08-05 RX ADMIN — Medication 100 MILLIGRAM(S): at 05:20

## 2024-08-05 RX ADMIN — Medication 2 TABLET(S): at 21:31

## 2024-08-05 RX ADMIN — IRON SUCROSE 110 MILLIGRAM(S): 20 INJECTION, SOLUTION INTRAVENOUS at 10:31

## 2024-08-05 RX ADMIN — Medication 100 MILLIGRAM(S): at 13:28

## 2024-08-05 RX ADMIN — Medication 1 APPLICATION(S): at 11:18

## 2024-08-05 RX ADMIN — Medication 40 MILLIGRAM(S): at 05:21

## 2024-08-05 NOTE — PROGRESS NOTE ADULT - ATTENDING COMMENTS
HPI as above.  Interval history: Pt seen and examined at bedside. No cp or sob.   Vital Signs (24 Hrs):  T(C): 36.6 (08-05-24 @ 07:30), Max: 36.7 (08-04-24 @ 23:50)  HR: 71 (08-05-24 @ 07:30) (71 - 82)  BP: 103/56 (08-05-24 @ 07:30) (103/56 - 119/68)  RR: 18 (08-04-24 @ 23:50) (18 - 19)  SpO2: 98% (08-04-24 @ 23:50) (98% - 98%)  Wt(kg): --  Daily     Daily     I&O's Summary    04 Aug 2024 07:01  -  05 Aug 2024 07:00  --------------------------------------------------------  IN: 0 mL / OUT: 700 mL / NET: -700 mL      PHYSICAL EXAM:  GENERAL: NAD  HEAD:  Atraumatic, Normocephalic  EYES: EOMI, PERRLA, conjunctiva and sclera clear  NECK: Supple, No JVD  CHEST/LUNG: Clear to auscultation bilaterally; No wheeze  HEART: Regular rate and rhythm; No murmurs, rubs, or gallops  ABDOMEN: Soft, Nontender, Nondistended; Bowel sounds present  EXTREMITIES:  2+ Peripheral Pulses, No clubbing, cyanosis, or edema  PSYCH: AAOx3  NEUROLOGY: non-focal  SKIN: No rashes or lesions  Labs reviewed    Plan  78-year-old female with past medical history of   CKD, HTN, CCY, gout,  severe AS being planned for tAVR, and history of severe anemia secondary to chronic upper GI bleeding due to  AV malformation gastric body dieulafoy lesion, weekly blood transfusions presenting to ER for a rupture pressure ulcer of the buttock area today.   Patient states that last night was on her toilet which cracked while she was sitting on it, and patient was unable to get off the toilet for 4 hours.  By the time EMS arrived skin had ulcerated, with a blister that ruptured predominantly to the left buttock area (appears to be like burn blister that ruptured) with linear abrasions to the bilateral upper thighs/buttocks.  Patient in so much pain that she cannot stand or walk,    # Bilateral buttocks and sacrum deep Tissue Injury  - Cleanse wound to bilateral buttocks and sacrum with Vashe wound cleanser. Pat dry, apply Silvadene, cover with Xeroform and abdominal pad twice a day and prn for soiling.     # Acute kidney inury on CKD stage 4, prerenal  # Rhabdomylosis- resolved  - Creatine Kinase, Serum: 439 U/L (08.01.24 @ 14:29)  - US Kidney and Bladder (07.30.24 @ 07:49) >No definite hydronephrosi  - S/p IV fluids  - Lasix held, then lasix resumed 8/5 po   - monitor UO  - monitor BMP    # Peripheral neuropathy  - c/w  neurontin  - F/u  B12, folate   - evaluated by PT : STR    # Acute on chronic  anemia  # Non erosive gastritis  # Severe diverticulosis  # Grade/Stage I external hemorrhoids  # H/o  AVMs  # Colon polyps  # History of perisplenic Varices in 2023   -  EGD-Colonoscopy (07.22.24 @ 12:30) >Erythema in the stomach compatible with non-erosive gastritis. 2 non-bleeding 7mm AVMs were seen at the second portion of the duodenum cauterization was successfully applied to ensurehemostasis. Polyps (5 mm) in the ascending colon. (Polypectomy). Polyps (10 mm) in the ascending colon. (Polypectomy). Polyp (10 mm) in the ascending colon. (Polypectomy, Endoclip). 3 non-bleeding AVMs ranging in size between 7-10mm were seen in the cecum.Using APC probe, cauterization was successfully performed to ensure hemostasis. 3 non-bleeding AVMs ranging in size between 7-105 mm were seen in theascending colon. Using APC probe, cauterization was successfully performed to ensure hemostasis. Severe diverticulosis of the the left side of the colon. Grade/Stage I external hemorrhoids.  - c/w protonix  - s/p 1U pRBC on 8/2   -  IV Venofer   - heprain ppx started , moniotor hb   #rest as above  #Progress Note Handoff  Pending (specify):  follow up rehab placement, hgb, cr   Family discussion: dw Son mr Marroquin and had extensive discussion, family wants 4A   Disposition: snf   Decision to admit the pt is based on acuity as above

## 2024-08-05 NOTE — PROGRESS NOTE ADULT - SUBJECTIVE AND OBJECTIVE BOX
seen and examined  24 h events noted   no distress         PAST HISTORY  --------------------------------------------------------------------------------  No significant changes to PMH, PSH, FHx, SHx, unless otherwise noted    ALLERGIES & MEDICATIONS  --------------------------------------------------------------------------------  Allergies    aspirin (Rash; Other)  penicillins (Rash; Urticaria; Hives; Blisters)  latex (Unknown)  EKG leads (Hives)    Intolerances      Standing Inpatient Medications  gabapentin 100 milliGRAM(s) Oral every 8 hours  iron sucrose IVPB 200 milliGRAM(s) IV Intermittent every 24 hours  metoprolol tartrate 25 milliGRAM(s) Oral daily  pantoprazole    Tablet 40 milliGRAM(s) Oral before breakfast  silver sulfADIAZINE 1% Cream 1 Application(s) Topical daily    PRN Inpatient Medications  acetaminophen     Tablet .. 650 milliGRAM(s) Oral every 6 hours PRN        VITALS/PHYSICAL EXAM  --------------------------------------------------------------------------------  T(C): 36.7 (08-04-24 @ 23:50), Max: 37 (08-04-24 @ 08:20)  HR: 81 (08-05-24 @ 05:21) (62 - 82)  BP: 119/60 (08-05-24 @ 05:21) (102/53 - 119/68)  RR: 18 (08-04-24 @ 23:50) (18 - 19)  SpO2: 98% (08-04-24 @ 23:50) (98% - 98%)  Wt(kg): --        08-04-24 @ 07:01  -  08-05-24 @ 07:00  --------------------------------------------------------  IN: 0 mL / OUT: 700 mL / NET: -700 mL      Physical Exam:  	Gen: NAD  	Pulm: decrease BS  B/L  	CV:  S1S2; no rub  	Abd: +distended      LABS/STUDIES  --------------------------------------------------------------------------------              7.9    5.48  >-----------<  107      [08-03-24 @ 10:18]              25.5     137  |  104  |  80  ----------------------------<  123      [08-03-24 @ 10:19]  4.5   |  23  |  3.0        Ca     7.4     [08-03-24 @ 10:19]      Mg     2.1     [08-03-24 @ 10:19]    TPro  4.3  /  Alb  2.5  /  TBili  0.7  /  DBili  x   /  AST  22  /  ALT  20  /  AlkPhos  64  [08-03-24 @ 10:19]      Creatinine Trend:  SCr 3.0 [08-03 @ 10:19]  SCr 3.1 [08-02 @ 11:04]  SCr 3.2 [08-02 @ 10:20]  SCr 3.2 [08-01 @ 18:45]  SCr 3.6 [07-30 @ 18:52]    Urinalysis - [08-03-24 @ 10:19]      Color  / Appearance  / SG  / pH       Gluc 123 / Ketone   / Bili  / Urobili        Blood  / Protein  / Leuk Est  / Nitrite       RBC  / WBC  / Hyaline  / Gran  / Sq Epi  / Non Sq Epi  / Bacteria     Urine Creatinine 67      [07-31-24 @ 11:00]  Urine Protein 5      [07-31-24 @ 11:00]  Urine Sodium 35.0      [07-31-24 @ 11:00]  Urine Urea Nitrogen 633      [07-31-24 @ 11:00]  Urine Potassium 31      [07-31-24 @ 11:00]  Urine Osmolality 380      [07-31-24 @ 11:00]    Iron 23, TIBC 163, %sat 14      [08-01-24 @ 14:29]  Ferritin 170      [08-01-24 @ 14:29]

## 2024-08-05 NOTE — PROGRESS NOTE ADULT - ASSESSMENT
78-year-old female with past medical history of   CKD, HTN, CCY, gout,  severe AS being planned for TAVR, and history of severe anemia secondary to chronic upper GI bleeding due to  AV malformation gastric body dieulafoy lesion, weekly blood transfusions presenting to ER for a rupture pressure ulcer of the buttock area.  #EVAN on CKD4  - creat stable   - no hydro on renal/bladder sono   - non oliguric   - no iv fluids   - will resume diuretics in am   -  last Phos 4.1 at goal  - complete course of venofer; will need WOODY once completed  no indication for RRT   will follow

## 2024-08-05 NOTE — PROGRESS NOTE ADULT - SUBJECTIVE AND OBJECTIVE BOX
24H events:    Patient is a 78y old Female who presents with a chief complaint of pressure ulcer (03 Aug 2024 11:12)  Primary diagnosis of Pressure ulcer  Today is hospital day 7d. This morning patient was seen and examined at bedside, resting comfortably in bed.    No acute or major events overnight.    Code Status:    Family communication:  Contact date:  Name of person contacted:  Relationship to patient:  Communication details:  What matters most:    PAST MEDICAL & SURGICAL HISTORY  HTN (hypertension)    Heart murmur    Eczema    Anemia  iron infusions and PRBC transfusions    Aortic stenosis    Chronic kidney disease (CKD)    2019 novel coronavirus disease (COVID-19)  2020- REHAB admission    Gastric AVM    H/O appendicitis    S/P cholecystectomy    History of esophagogastroduodenoscopy (EGD)    H/O colonoscopy    H/O  section    History of appendectomy    Port-A-Cath in place  right CW      SOCIAL HISTORY:  Social History:      ALLERGIES:  aspirin (Rash; Other)  penicillins (Rash; Urticaria; Hives; Blisters)  latex (Unknown)  EKG leads (Hives)    MEDICATIONS:  STANDING MEDICATIONS  furosemide    Tablet 40 milliGRAM(s) Oral daily  gabapentin 100 milliGRAM(s) Oral every 8 hours  heparin   Injectable 5000 Unit(s) SubCutaneous every 12 hours  iron sucrose IVPB 200 milliGRAM(s) IV Intermittent every 24 hours  metoprolol tartrate 25 milliGRAM(s) Oral daily  pantoprazole    Tablet 40 milliGRAM(s) Oral before breakfast  silver sulfADIAZINE 1% Cream 1 Application(s) Topical daily    PRN MEDICATIONS  acetaminophen     Tablet .. 650 milliGRAM(s) Oral every 6 hours PRN    VITALS:   T(F): 97.8  HR: 71  BP: 103/56  RR: 18  SpO2: 98%    PHYSICAL EXAM:    O/E:  Awake, alert, not in distress.  HEENT: atraumatic, EOMI.  Chest: decrease breath sounds at bases  CVS: SIS2 +, systolic  murmur+  P/A: Soft, BS+  CNS: awake, alert  Ext: no edema feet.  Skin: Bilateral buttocks and sacrum deep Tissue Injury  All systems reviewed positive findings as above.      (  ) Indwelling Arora Catheter:   Date insterted:    Reason (  ) Critical illness     (  ) urinary retention    (  ) Accurate Ins/Outs Monitoring     (  ) CMO patient    (  ) Central Line:   Date inserted:  Location: (  ) Right IJ     (  ) Left IJ     (  ) Right Fem     (  ) Left Fem    (  ) SPC        (  ) pigtail       (  ) PEG tube       (  ) colostomy       (  ) jejunostomy  (  ) U-Dall    LABS:                        7.9    5.48  )-----------( 107      ( 03 Aug 2024 10:18 )             25.5     08-    137  |  104  |  80<HH>  ----------------------------<  123<H>  4.5   |  23  |  3.0<H>    Ca    7.4<L>      03 Aug 2024 10:19  Mg     2.1     -    TPro  4.3<L>  /  Alb  2.5<L>  /  TBili  0.7  /  DBili  x   /  AST  22  /  ALT  20  /  AlkPhos  64  08-      Urinalysis Basic - ( 03 Aug 2024 10:19 )    Color: x / Appearance: x / SG: x / pH: x  Gluc: 123 mg/dL / Ketone: x  / Bili: x / Urobili: x   Blood: x / Protein: x / Nitrite: x   Leuk Esterase: x / RBC: x / WBC x   Sq Epi: x / Non Sq Epi: x / Bacteria: x                RADIOLOGY:

## 2024-08-05 NOTE — PROGRESS NOTE ADULT - ASSESSMENT
78-year-old female with past medical history of   CKD, HTN, CCY, gout,  severe AS being planned for tAVR, and history of severe anemia secondary to chronic upper GI bleeding due to  AV malformation gastric body dieulafoy lesion, weekly blood transfusions presenting to ER for a rupture pressure ulcer of the buttock area today.   Patient states that last night was on her toilet which cracked while she was sitting on it, and patient was unable to get off the toilet for 4 hours.  By the time EMS arrived skin had ulcerated, with a blister that ruptured predominantly to the left buttock area (appears to be like burn blister that ruptured) with linear abrasions to the bilateral upper thighs/buttocks.  Patient in so much pain that she cannot stand or walk,    # Bilateral buttocks and sacrum deep Tissue Injury  - Cleanse wound to bilateral buttocks and sacrum with Vashe wound cleanser. Pat dry, apply Silvadene, cover with Xeroform and abdominal pad twice a day and prn for soiling.     # Acute kidney inury on CKD stage 4, prerenal  # Rhabdomylosis- resolved  - Creatine Kinase, Serum: 439 U/L (08.01.24 @ 14:29)  - US Kidney and Bladder (07.30.24 @ 07:49) >No definite hydronephrosi  - S/p IV fluids  - Lasix held, then lasix resumed 8/5  - monitor UO  - monitor BMP    # Peripheral neuropathy  - c/w  neurontin  - F/u  B12, folate   - evaluated by PT : STR    # Acute on chronic  anemia  # Non erosive gastritis  # Severe diverticulosis  # Grade/Stage I external hemorrhoids  # H/o  AVMs  # Colon polyps  # History of perisplenic Varices in 2023   -  EGD-Colonoscopy (07.22.24 @ 12:30) >Erythema in the stomach compatible with non-erosive gastritis. 2 non-bleeding 7mm AVMs were seen at the second portion of the duodenum cauterization was successfully applied to ensurehemostasis. Polyps (5 mm) in the ascending colon. (Polypectomy). Polyps (10 mm) in the ascending colon. (Polypectomy). Polyp (10 mm) in the ascending colon. (Polypectomy, Endoclip). 3 non-bleeding AVMs ranging in size between 7-10mm were seen in the cecum.Using APC probe, cauterization was successfully performed to ensure hemostasis. 3 non-bleeding AVMs ranging in size between 7-105 mm were seen in theascending colon. Using APC probe, cauterization was successfully performed to ensure hemostasis. Severe diverticulosis of the the left side of the colon. Grade/Stage I external hemorrhoids.  - c/w protonix  - s/p 1U pRBC on 8/2   -  IV Venofer     # Chronic diastolic CHF  # Severe aortic stenosis   -  TTE Echo Complete w/o Contrast w/ Doppler (08.01.24 @ 11:03) >EF of 56 %. Mild asymmetric left ventricular hypertrophy. Grade II diastolic dysfunction). Severe aortic valve stenosis , Mild to moderate mitral valve regurgitation. Moderate aortic regurgitation.  - Patient to follow up with Dr. Hernandez as an outpatient for TAVR  - c/w metoprolol    # Thrombocytopenia  - monitor platelets    DVT PPX: heparin   GI PPX: pantoprazole   DIET: dash   ACTIVITY:   CODE STATUS: full   DISPOSITION:     PENDING:   # Pending: monitor BMP

## 2024-08-06 LAB
ALBUMIN SERPL ELPH-MCNC: 2.6 G/DL — LOW (ref 3.5–5.2)
ALP SERPL-CCNC: 71 U/L — SIGNIFICANT CHANGE UP (ref 30–115)
ALT FLD-CCNC: 15 U/L — SIGNIFICANT CHANGE UP (ref 0–41)
ANION GAP SERPL CALC-SCNC: 10 MMOL/L — SIGNIFICANT CHANGE UP (ref 7–14)
AST SERPL-CCNC: 16 U/L — SIGNIFICANT CHANGE UP (ref 0–41)
BASOPHILS # BLD AUTO: 0.02 K/UL — SIGNIFICANT CHANGE UP (ref 0–0.2)
BASOPHILS NFR BLD AUTO: 0.3 % — SIGNIFICANT CHANGE UP (ref 0–1)
BILIRUB SERPL-MCNC: 0.4 MG/DL — SIGNIFICANT CHANGE UP (ref 0.2–1.2)
BUN SERPL-MCNC: 89 MG/DL — CRITICAL HIGH (ref 10–20)
CALCIUM SERPL-MCNC: 8 MG/DL — LOW (ref 8.4–10.5)
CHLORIDE SERPL-SCNC: 106 MMOL/L — SIGNIFICANT CHANGE UP (ref 98–110)
CK SERPL-CCNC: 46 U/L — SIGNIFICANT CHANGE UP (ref 0–225)
CO2 SERPL-SCNC: 23 MMOL/L — SIGNIFICANT CHANGE UP (ref 17–32)
CREAT SERPL-MCNC: 2.5 MG/DL — HIGH (ref 0.7–1.5)
EGFR: 19 ML/MIN/1.73M2 — LOW
EOSINOPHIL # BLD AUTO: 0.69 K/UL — SIGNIFICANT CHANGE UP (ref 0–0.7)
EOSINOPHIL NFR BLD AUTO: 10.4 % — HIGH (ref 0–8)
GLUCOSE SERPL-MCNC: 103 MG/DL — HIGH (ref 70–99)
HCT VFR BLD CALC: 24.7 % — LOW (ref 37–47)
HGB BLD-MCNC: 7.4 G/DL — LOW (ref 12–16)
IMM GRANULOCYTES NFR BLD AUTO: 0.6 % — HIGH (ref 0.1–0.3)
LYMPHOCYTES # BLD AUTO: 0.54 K/UL — LOW (ref 1.2–3.4)
LYMPHOCYTES # BLD AUTO: 8.1 % — LOW (ref 20.5–51.1)
MAGNESIUM SERPL-MCNC: 2.4 MG/DL — SIGNIFICANT CHANGE UP (ref 1.8–2.4)
MCHC RBC-ENTMCNC: 29.4 PG — SIGNIFICANT CHANGE UP (ref 27–31)
MCHC RBC-ENTMCNC: 30 G/DL — LOW (ref 32–37)
MCV RBC AUTO: 98 FL — SIGNIFICANT CHANGE UP (ref 81–99)
MONOCYTES # BLD AUTO: 0.53 K/UL — SIGNIFICANT CHANGE UP (ref 0.1–0.6)
MONOCYTES NFR BLD AUTO: 8 % — SIGNIFICANT CHANGE UP (ref 1.7–9.3)
NEUTROPHILS # BLD AUTO: 4.84 K/UL — SIGNIFICANT CHANGE UP (ref 1.4–6.5)
NEUTROPHILS NFR BLD AUTO: 72.6 % — SIGNIFICANT CHANGE UP (ref 42.2–75.2)
NRBC # BLD: 0 /100 WBCS — SIGNIFICANT CHANGE UP (ref 0–0)
PLATELET # BLD AUTO: 143 K/UL — SIGNIFICANT CHANGE UP (ref 130–400)
PMV BLD: 10.1 FL — SIGNIFICANT CHANGE UP (ref 7.4–10.4)
POTASSIUM SERPL-MCNC: 5.6 MMOL/L — HIGH (ref 3.5–5)
POTASSIUM SERPL-SCNC: 5.6 MMOL/L — HIGH (ref 3.5–5)
PROT SERPL-MCNC: 4.4 G/DL — LOW (ref 6–8)
RBC # BLD: 2.52 M/UL — LOW (ref 4.2–5.4)
RBC # FLD: 18.1 % — HIGH (ref 11.5–14.5)
SODIUM SERPL-SCNC: 139 MMOL/L — SIGNIFICANT CHANGE UP (ref 135–146)
WBC # BLD: 6.66 K/UL — SIGNIFICANT CHANGE UP (ref 4.8–10.8)
WBC # FLD AUTO: 6.66 K/UL — SIGNIFICANT CHANGE UP (ref 4.8–10.8)

## 2024-08-06 PROCEDURE — 99232 SBSQ HOSP IP/OBS MODERATE 35: CPT

## 2024-08-06 RX ORDER — GABAPENTIN 100 MG
100 CAPSULE ORAL DAILY
Refills: 0 | Status: DISCONTINUED | OUTPATIENT
Start: 2024-08-06 | End: 2024-08-08

## 2024-08-06 RX ORDER — CHLORHEXIDINE GLUCONATE 40 MG/ML
1 SOLUTION TOPICAL EVERY 12 HOURS
Refills: 0 | Status: COMPLETED | OUTPATIENT
Start: 2024-08-06 | End: 2024-08-07

## 2024-08-06 RX ORDER — SODIUM ZIRCONIUM CYCLOSILICATE 5 G/5G
10 POWDER, FOR SUSPENSION ORAL ONCE
Refills: 0 | Status: COMPLETED | OUTPATIENT
Start: 2024-08-06 | End: 2024-08-06

## 2024-08-06 RX ADMIN — ACETAMINOPHEN 650 MILLIGRAM(S): 325 TABLET ORAL at 23:55

## 2024-08-06 RX ADMIN — Medication 100 MILLIGRAM(S): at 11:04

## 2024-08-06 RX ADMIN — POLYETHYLENE GLYCOL 3350 17 GRAM(S): 17 POWDER, FOR SOLUTION ORAL at 11:04

## 2024-08-06 RX ADMIN — Medication 100 MILLIGRAM(S): at 05:39

## 2024-08-06 RX ADMIN — Medication 1 APPLICATION(S): at 11:05

## 2024-08-06 RX ADMIN — SODIUM ZIRCONIUM CYCLOSILICATE 10 GRAM(S): 5 POWDER, FOR SUSPENSION ORAL at 17:06

## 2024-08-06 RX ADMIN — SODIUM ZIRCONIUM CYCLOSILICATE 10 GRAM(S): 5 POWDER, FOR SUSPENSION ORAL at 13:25

## 2024-08-06 RX ADMIN — Medication 40 MILLIGRAM(S): at 05:40

## 2024-08-06 RX ADMIN — Medication 5000 UNIT(S): at 16:56

## 2024-08-06 RX ADMIN — Medication 5000 UNIT(S): at 05:40

## 2024-08-06 RX ADMIN — CHLORHEXIDINE GLUCONATE 1 APPLICATION(S): 40 SOLUTION TOPICAL at 16:56

## 2024-08-06 RX ADMIN — METOPROLOL TARTRATE 25 MILLIGRAM(S): 100 TABLET ORAL at 05:41

## 2024-08-06 RX ADMIN — IRON SUCROSE 110 MILLIGRAM(S): 20 INJECTION, SOLUTION INTRAVENOUS at 10:04

## 2024-08-06 NOTE — PROGRESS NOTE ADULT - SUBJECTIVE AND OBJECTIVE BOX
SUBJECTIVE/OVERNIGHT EVENTS  Today is hospital day 8d. This morning patient was seen and examined at bedside, resting comfortably in bed. No acute or major events overnight. Pt says pain has improved, no dizziness, no n/v.     MEDICATIONS  STANDING MEDICATIONS  chlorhexidine 2% Cloths 1 Application(s) Topical every 12 hours  furosemide    Tablet 40 milliGRAM(s) Oral daily  gabapentin 100 milliGRAM(s) Oral daily  heparin   Injectable 5000 Unit(s) SubCutaneous every 12 hours  iron sucrose IVPB 200 milliGRAM(s) IV Intermittent every 24 hours  metoprolol tartrate 25 milliGRAM(s) Oral daily  pantoprazole    Tablet 40 milliGRAM(s) Oral before breakfast  polyethylene glycol 3350 17 Gram(s) Oral daily  senna 2 Tablet(s) Oral at bedtime  silver sulfADIAZINE 1% Cream 1 Application(s) Topical daily    PRN MEDICATIONS  acetaminophen     Tablet .. 650 milliGRAM(s) Oral every 6 hours PRN    VITALS  T(F): 98.1 (08-06-24 @ 15:35), Max: 98.3 (08-06-24 @ 00:20)  HR: 79 (08-06-24 @ 15:35) (66 - 83)  BP: 110/64 (08-06-24 @ 15:35) (105/65 - 115/50)  RR: 18 (08-06-24 @ 15:35) (18 - 18)  SpO2: 99% (08-06-24 @ 15:35) (96% - 99%)    PHYSICAL EXAM  General: NAD, non-toxic appearing  HEENT: EOMi, no scleral icterus  CV: systolic murmur  Lungs: normal respiratory effort, CTAB  Abd: Soft, nontender, nondistended  Ext: b/l pitting edema worse on right  Psych: A+Ox3, appropriate affect  Neuro: grossly non-focal, moving all extremities spontaneously  Skin: ruptured pressure ulcer of buttocks     LABS             7.4    6.66  )-----------( 143      ( 08-06-24 @ 10:35 )             24.7     139  |  106  |  89  -------------------------<  103   08-06-24 @ 10:35  5.6  |  23  |  2.5    Ca      8.0     08-06-24 @ 10:35  Mg     2.4     08-06-24 @ 10:35    TPro  4.4  /  Alb  2.6  /  TBili  0.4  /  DBili  x   /  AST  16  /  ALT  15  /  AlkPhos  71  /  GGT  x     08-06-24 @ 10:35        Urinalysis Basic - ( 06 Aug 2024 10:35 )    Color: x / Appearance: x / SG: x / pH: x  Gluc: 103 mg/dL / Ketone: x  / Bili: x / Urobili: x   Blood: x / Protein: x / Nitrite: x   Leuk Esterase: x / RBC: x / WBC x   Sq Epi: x / Non Sq Epi: x / Bacteria: x

## 2024-08-06 NOTE — PROGRESS NOTE ADULT - ASSESSMENT
78F PMH CKD, HTN, CCY, gout, severe AS being planned for tAVR, and history of severe anemia secondary to chronic upper GI bleeding due to AV malformation gastric body dieulafoy lesion, weekly blood transfusions who presented for a ruptured pressure ulcer of the buttock area. Rupture occurred while on the toilet and patient was unable to stand for 4 hours. By the time EMS arrived skin had ulcerated, with a blister that ruptured predominantly to the left buttock area (appears to be like burn blister that ruptured) with linear abrasions to the bilateral upper thighs/buttocks.    # Ruptured Bilateral buttocks and sacrum deep Tissue Injury  - per wound recs: Cleanse wound to bilateral buttocks and sacrum with Vashe wound cleanser. Pat dry, apply Silvadene, cover with Xeroform and abdominal pad twice a day and prn for soiling  - Hg stable    # Acute on Chronic normocytic DARRIN   # History of AVMs (gastric and duodenal) and Dieulafoy Lesions Status Post Hemostasis in 2023  # History of Perisplenic Varices in 2023   - on weekly IV transfusion/iron infusions per hematology  -  7/22: EGD and Colonoscopy: Erythema in the stomach compatible with non-erosive gastritis. 2 non-bleeding 7mm AVMs were seen at the second portion of the duodenum cauterization was successfully applied to ensurehemostasis. Polyps (5 mm) in the ascending colon. (Polypectomy). Polyps (10 mm) in the ascending colon. (Polypectomy). Polyp (10 mm) in the ascending colon. (Polypectomy, Endoclip). 3 non-bleeding AVMs ranging in size between 7-10mm were seen in the cecum.Using APC probe, cauterization was successfully performed to ensure hemostasis. 3 non-bleeding AVMs ranging in size between 7-105 mm were seen in theascending colon. Using APC probe, cauterization was successfully performed to ensure hemostasis. Severe diverticulosis of the the left side of the colon. Grade/Stage I external hemorrhoids.  - s/p 1U pRBC on 8/2. Hg stable   - complete course of venofer total of 1 g ; will need WOODY once completed  - c/w protonix    # EVAN on stage 4 CKD  - Pt had elevated CK on 7/30/24 to 2766 with concern for rhabdo--> CK has since improved to 46 8/6/24  - Elevated BUN/Cr: continue to trend  - 7/30: KUB: No definite hydronephrosis  - S/p IV fluids  - Lasix held, resumed 8/5  - Monitor I/O  - Low K diet, being given Lokelma, monitor BMP  - Nephro following    #Diastolic heart failure   # Severe aortic stenosis being planned for TAVR (oupt f/u: Dr. Hernandez)  -  8/1: TTE: EF- 56%. Mild asymmetric left ventricular hypertrophy. Grade II diastolic dysfunction. Severe aortic valve stenosis. Mild to moderate mitral valve regurgitation. Moderate aortic regurgitation.  - c/w metoprolol    # Peripheral neuropathy  - c/w home gabapentin 100  - F/u B12, folate - sent 8/6  - evaluated by PT 7/31: STR    # Thrombocytopenia  - monitor platelets    DVT PPX: heparin   GI PPX: pantoprazole   DIET: DASH  CODE STATUS: full   DISPOSITION: STR    Pending: f/u BUN/Cr, electrolytes- replete as needed, B12, folate

## 2024-08-06 NOTE — PROGRESS NOTE ADULT - ASSESSMENT
Patient is a 78-year-old female with past medical history of   CKD, HTN, CCY, gout,  severe AS being planned for tAVR, and history of severe anemia secondary to chronic upper GI bleeding due to  AV malformation gastric body dieulafoy lesion, weekly blood transfusions presenting to ER for a rupture pressure ulcer of the buttock area today.   Patient states that last night was on her toilet which cracked while she was sitting on it, and patient was unable to get off the toilet for 4 hours.  By the time EMS arrived skin had ulcerated, with a blister that ruptured predominantly to the left buttock area (appears to be like burn blister that ruptured) with linear abrasions to the bilateral upper thighs/buttocks.  Patient in so much pain that she cannot stand or walk,    # Bilateral buttocks and sacrum deep Tissue Injury  - Cleanse wound to bilateral buttocks and sacrum with Vashe wound cleanser. Pat dry, apply Silvadene, cover with Xeroform and abdominal pad twice a day and prn for soiling.     # Acute kidney inury on CKD stage 4, prerenal  # Rhabdomylosis- resolved  - Creatine Kinase, Serum: 439 U/L (08.01.24 @ 14:29)  - US Kidney and Bladder (07.30.24 @ 07:49) >No definite hydronephrosi  - S/p IV fluids  - c/w  lasix   - monitor UO  - monitor BMP    # Hyperkalemia  - low k diet  - lokelma  - monitor k    # Peripheral neuropathy  - c/w  neurontin  - F/u  B12, folate   - evaluated by PT : STR    # Acute on chronic  anemia  # Non erosive gastritis  # Severe diverticulosis  # Grade/Stage I external hemorrhoids  # H/o  AVMs  # Colon polyps  # History of perisplenic Varices in 2023   -  EGD-Colonoscopy (07.22.24 @ 12:30) >Erythema in the stomach compatible with non-erosive gastritis. 2 non-bleeding 7mm AVMs were seen at the second portion of the duodenum cauterization was successfully applied to ensurehemostasis. Polyps (5 mm) in the ascending colon. (Polypectomy). Polyps (10 mm) in the ascending colon. (Polypectomy). Polyp (10 mm) in the ascending colon. (Polypectomy, Endoclip). 3 non-bleeding AVMs ranging in size between 7-10mm were seen in the cecum.Using APC probe, cauterization was successfully performed to ensure hemostasis. 3 non-bleeding AVMs ranging in size between 7-105 mm were seen in theascending colon. Using APC probe, cauterization was successfully performed to ensure hemostasis. Severe diverticulosis of the the left side of the colon. Grade/Stage I external hemorrhoids.  - c/w protonix  - s/p 1U pRBC on 8/2   - c/w   IV Venofer     # Chronic diastolic CHF  # Severe aortic stenosis   -  TTE Echo Complete w/o Contrast w/ Doppler (08.01.24 @ 11:03) >EF of 56 %. Mild asymmetric left ventricular hypertrophy. Grade II diastolic dysfunction). Severe aortic valve stenosis , Mild to moderate mitral valve regurgitation. Moderate aortic regurgitation.  - Patient to follow up with Dr. Hernandez as an outpatient for TAVR  - c/w metoprolol, lasix    # Thrombocytopenia- resolved    # DVT prophylaxis    # Full code    # Pending: monitor BMP, auth for STR  # Disposition: STR

## 2024-08-06 NOTE — PROGRESS NOTE ADULT - ASSESSMENT
78-year-old female with past medical history of   CKD, HTN, CCY, gout,  severe AS being planned for TAVR, and history of severe anemia secondary to chronic upper GI bleeding due to  AV malformation gastric body dieulafoy lesion, weekly blood transfusions presenting to ER for a rupture pressure ulcer of the buttock area.  #EVAN on CKD4  - creat stable / please repeat labs   - no hydro on renal/bladder sono   - please document UO  - bladder scan q shift   - continue lasix 40  -  last Phos 4.1 at goal  - complete course of venofer total of 1 g ; will need WOODY once completed  no indication for RRT   will follow

## 2024-08-06 NOTE — PROGRESS NOTE ADULT - SUBJECTIVE AND OBJECTIVE BOX
seen and examined  24 h events noted   no distress       PAST HISTORY  --------------------------------------------------------------------------------  No significant changes to PMH, PSH, FHx, SHx, unless otherwise noted    ALLERGIES & MEDICATIONS  --------------------------------------------------------------------------------  Allergies    aspirin (Rash; Other)  penicillins (Rash; Urticaria; Hives; Blisters)  latex (Unknown)  EKG leads (Hives)    Intolerances      Standing Inpatient Medications  furosemide    Tablet 40 milliGRAM(s) Oral daily  gabapentin 100 milliGRAM(s) Oral every 8 hours  heparin   Injectable 5000 Unit(s) SubCutaneous every 12 hours  iron sucrose IVPB 200 milliGRAM(s) IV Intermittent every 24 hours  metoprolol tartrate 25 milliGRAM(s) Oral daily  pantoprazole    Tablet 40 milliGRAM(s) Oral before breakfast  polyethylene glycol 3350 17 Gram(s) Oral daily  senna 2 Tablet(s) Oral at bedtime  silver sulfADIAZINE 1% Cream 1 Application(s) Topical daily    PRN Inpatient Medications  acetaminophen     Tablet .. 650 milliGRAM(s) Oral every 6 hours PRN          VITALS/PHYSICAL EXAM  --------------------------------------------------------------------------------  T(C): 36.8 (08-06-24 @ 00:20), Max: 36.8 (08-06-24 @ 00:20)  HR: 83 (08-06-24 @ 00:20) (71 - 83)  BP: 115/50 (08-06-24 @ 00:20) (103/56 - 115/64)  RR: 18 (08-06-24 @ 00:20) (18 - 18)  SpO2: 96% (08-06-24 @ 00:20) (96% - 97%)  Wt(kg): --        Physical Exam:  	Pulm: decrease BS  B/L  	CV:  S1S2; no rub  	Abd: +distended  	LE:  no edema  	    LABS/STUDIES  -------------------------------------------------------------------------------    Creatinine Trend:  SCr 3.0 [08-03 @ 10:19]  SCr 3.1 [08-02 @ 11:04]  SCr 3.2 [08-02 @ 10:20]  SCr 3.2 [08-01 @ 18:45]  SCr 3.6 [07-30 @ 18:52]    Urinalysis - [08-03-24 @ 10:19]      Color  / Appearance  / SG  / pH       Gluc 123 / Ketone   / Bili  / Urobili        Blood  / Protein  / Leuk Est  / Nitrite       RBC  / WBC  / Hyaline  / Gran  / Sq Epi  / Non Sq Epi  / Bacteria     Urine Creatinine 67      [07-31-24 @ 11:00]  Urine Protein 5      [07-31-24 @ 11:00]  Urine Sodium 35.0      [07-31-24 @ 11:00]  Urine Urea Nitrogen 633      [07-31-24 @ 11:00]  Urine Potassium 31      [07-31-24 @ 11:00]  Urine Osmolality 380      [07-31-24 @ 11:00]    Iron 23, TIBC 163, %sat 14      [08-01-24 @ 14:29]  Ferritin 170      [08-01-24 @ 14:29]

## 2024-08-06 NOTE — PROGRESS NOTE ADULT - SUBJECTIVE AND OBJECTIVE BOX
Patient is a 78y old  Female who presents with a chief complaint of pressure ulcer (03 Aug 2024 11:12)    Patient was seen and examined.  no new complaints  All systems reviewed    PAST MEDICAL & SURGICAL HISTORY:  HTN (hypertension)  Heart murmur  Eczema  Anemia  Aortic stenosis  Chronic kidney disease (CKD)  2019 novel coronavirus disease (COVID-19)  Gastric AVM  H/O appendicitis  S/P cholecystectomy  History of esophagogastroduodenoscopy (EGD)  H/O colonoscopy  H/O  section  History of appendectomy  Port-A-Cath in place right CW    Allergies  aspirin (Rash; Other)  penicillins (Rash; Urticaria; Hives; Blisters)  latex (Unknown)  EKG leads (Hives)    MEDICATIONS  (STANDING):  furosemide    Tablet 40 milliGRAM(s) Oral daily  gabapentin 100 milliGRAM(s) Oral daily  heparin   Injectable 5000 Unit(s) SubCutaneous every 12 hours  iron sucrose IVPB 200 milliGRAM(s) IV Intermittent every 24 hours  metoprolol tartrate 25 milliGRAM(s) Oral daily  pantoprazole    Tablet 40 milliGRAM(s) Oral before breakfast  polyethylene glycol 3350 17 Gram(s) Oral daily  senna 2 Tablet(s) Oral at bedtime  silver sulfADIAZINE 1% Cream 1 Application(s) Topical daily    MEDICATIONS  (PRN):  acetaminophen     Tablet .. 650 milliGRAM(s) Oral every 6 hours PRN Mild Pain (1 - 3)    T(C): 36.8 (24 @ 07:30), Max: 36.8 (24 @ 00:20)  HR: 66 (24 @ 07:30) (66 - 83)  BP: 105/65 (24 @ 07:30) (105/65 - 115/64)  RR: 18 (24 @ 00:20) (18 - 18)  SpO2: 96% (24 @ 00:20) (96% - 97%)    O/E:  Awake, alert, not in distress.  HEENT: atraumatic, EOMI.  Chest: decrease breath sounds at bases  CVS: SIS2 +, systolic  murmur+  P/A: Soft, BS+  CNS: awake, alert  Ext: no edema feet.  Skin: Bilateral buttocks and sacrum deep Tissue Injury  All systems reviewed positive findings as above.                                     7.4<L>  6.66  )-----------( 143      ( 06 Aug 2024 10:35 )             24.7<L>      139  |  106  |  89<HH>  ----------------------------<  103<H>  5.6<H>   |  23  |  2.5<H>    Ca    8.0<L>      06 Aug 2024 10:35  Mg     2.4         TPro  4.4<L>  /  Alb  2.6<L>  /  TBili  0.4  /  DBili  x   /  AST  16  /  ALT  15  /  AlkPhos  71

## 2024-08-07 LAB
ALBUMIN SERPL ELPH-MCNC: 2.5 G/DL — LOW (ref 3.5–5.2)
ALP SERPL-CCNC: 68 U/L — SIGNIFICANT CHANGE UP (ref 30–115)
ALT FLD-CCNC: 13 U/L — SIGNIFICANT CHANGE UP (ref 0–41)
ANION GAP SERPL CALC-SCNC: 10 MMOL/L — SIGNIFICANT CHANGE UP (ref 7–14)
AST SERPL-CCNC: 14 U/L — SIGNIFICANT CHANGE UP (ref 0–41)
BASOPHILS # BLD AUTO: 0.01 K/UL — SIGNIFICANT CHANGE UP (ref 0–0.2)
BASOPHILS NFR BLD AUTO: 0.2 % — SIGNIFICANT CHANGE UP (ref 0–1)
BILIRUB SERPL-MCNC: 0.3 MG/DL — SIGNIFICANT CHANGE UP (ref 0.2–1.2)
BUN SERPL-MCNC: 99 MG/DL — CRITICAL HIGH (ref 10–20)
CALCIUM SERPL-MCNC: 7.9 MG/DL — LOW (ref 8.4–10.4)
CHLORIDE SERPL-SCNC: 107 MMOL/L — SIGNIFICANT CHANGE UP (ref 98–110)
CO2 SERPL-SCNC: 23 MMOL/L — SIGNIFICANT CHANGE UP (ref 17–32)
CREAT SERPL-MCNC: 2.6 MG/DL — HIGH (ref 0.7–1.5)
EGFR: 18 ML/MIN/1.73M2 — LOW
EOSINOPHIL # BLD AUTO: 0.49 K/UL — SIGNIFICANT CHANGE UP (ref 0–0.7)
EOSINOPHIL NFR BLD AUTO: 8.3 % — HIGH (ref 0–8)
FOLATE SERPL-MCNC: 2 NG/ML — LOW
GLUCOSE SERPL-MCNC: 108 MG/DL — HIGH (ref 70–99)
HCT VFR BLD CALC: 23.5 % — LOW (ref 37–47)
HGB BLD-MCNC: 7.1 G/DL — LOW (ref 12–16)
IMM GRANULOCYTES NFR BLD AUTO: 0.7 % — HIGH (ref 0.1–0.3)
LYMPHOCYTES # BLD AUTO: 0.56 K/UL — LOW (ref 1.2–3.4)
LYMPHOCYTES # BLD AUTO: 9.5 % — LOW (ref 20.5–51.1)
MAGNESIUM SERPL-MCNC: 2.5 MG/DL — HIGH (ref 1.8–2.4)
MCHC RBC-ENTMCNC: 29.7 PG — SIGNIFICANT CHANGE UP (ref 27–31)
MCHC RBC-ENTMCNC: 30.2 G/DL — LOW (ref 32–37)
MCV RBC AUTO: 98.3 FL — SIGNIFICANT CHANGE UP (ref 81–99)
MONOCYTES # BLD AUTO: 0.47 K/UL — SIGNIFICANT CHANGE UP (ref 0.1–0.6)
MONOCYTES NFR BLD AUTO: 8 % — SIGNIFICANT CHANGE UP (ref 1.7–9.3)
NEUTROPHILS # BLD AUTO: 4.3 K/UL — SIGNIFICANT CHANGE UP (ref 1.4–6.5)
NEUTROPHILS NFR BLD AUTO: 73.3 % — SIGNIFICANT CHANGE UP (ref 42.2–75.2)
NRBC # BLD: 0 /100 WBCS — SIGNIFICANT CHANGE UP (ref 0–0)
PHOSPHATE SERPL-MCNC: 5.1 MG/DL — HIGH (ref 2.1–4.9)
PLATELET # BLD AUTO: 146 K/UL — SIGNIFICANT CHANGE UP (ref 130–400)
PMV BLD: 10.4 FL — SIGNIFICANT CHANGE UP (ref 7.4–10.4)
POTASSIUM SERPL-MCNC: 5 MMOL/L — SIGNIFICANT CHANGE UP (ref 3.5–5)
POTASSIUM SERPL-SCNC: 5 MMOL/L — SIGNIFICANT CHANGE UP (ref 3.5–5)
PROT SERPL-MCNC: 4.2 G/DL — LOW (ref 6–8)
RBC # BLD: 2.39 M/UL — LOW (ref 4.2–5.4)
RBC # FLD: 18.5 % — HIGH (ref 11.5–14.5)
SODIUM SERPL-SCNC: 140 MMOL/L — SIGNIFICANT CHANGE UP (ref 135–146)
VIT B12 SERPL-MCNC: 231 PG/ML — LOW (ref 232–1245)
WBC # BLD: 5.87 K/UL — SIGNIFICANT CHANGE UP (ref 4.8–10.8)
WBC # FLD AUTO: 5.87 K/UL — SIGNIFICANT CHANGE UP (ref 4.8–10.8)

## 2024-08-07 PROCEDURE — 99418 PROLNG IP/OBS E/M EA 15 MIN: CPT

## 2024-08-07 PROCEDURE — 99233 SBSQ HOSP IP/OBS HIGH 50: CPT

## 2024-08-07 RX ORDER — CYANOCOBALAMIN (VITAMIN B-12) 500MCG/0.1
1000 GEL (ML) NASAL DAILY
Refills: 0 | Status: DISCONTINUED | OUTPATIENT
Start: 2024-08-07 | End: 2024-08-09

## 2024-08-07 RX ORDER — FOLIC ACID 1 MG
1 TABLET ORAL DAILY
Refills: 0 | Status: DISCONTINUED | OUTPATIENT
Start: 2024-08-07 | End: 2024-08-23

## 2024-08-07 RX ADMIN — METOPROLOL TARTRATE 25 MILLIGRAM(S): 100 TABLET ORAL at 05:57

## 2024-08-07 RX ADMIN — Medication 2 TABLET(S): at 21:20

## 2024-08-07 RX ADMIN — Medication 5000 UNIT(S): at 17:19

## 2024-08-07 RX ADMIN — Medication 1 APPLICATION(S): at 11:18

## 2024-08-07 RX ADMIN — CHLORHEXIDINE GLUCONATE 1 APPLICATION(S): 40 SOLUTION TOPICAL at 05:59

## 2024-08-07 RX ADMIN — IRON SUCROSE 110 MILLIGRAM(S): 20 INJECTION, SOLUTION INTRAVENOUS at 11:18

## 2024-08-07 RX ADMIN — POLYETHYLENE GLYCOL 3350 17 GRAM(S): 17 POWDER, FOR SOLUTION ORAL at 11:18

## 2024-08-07 RX ADMIN — Medication 1000 MICROGRAM(S): at 12:44

## 2024-08-07 RX ADMIN — Medication 1 MILLIGRAM(S): at 12:44

## 2024-08-07 RX ADMIN — Medication 100 MILLIGRAM(S): at 11:18

## 2024-08-07 RX ADMIN — Medication 40 MILLIGRAM(S): at 05:57

## 2024-08-07 RX ADMIN — Medication 5000 UNIT(S): at 05:57

## 2024-08-07 RX ADMIN — ACETAMINOPHEN 650 MILLIGRAM(S): 325 TABLET ORAL at 00:35

## 2024-08-07 NOTE — PROGRESS NOTE ADULT - ASSESSMENT
78-year-old female with past medical history of   CKD, HTN, CCY, gout,  severe AS being planned for tAVR, and history of severe anemia secondary to chronic upper GI bleeding due to  AV malformation gastric body dieulafoy lesion, weekly blood transfusions presenting to ER for a rupture pressure ulcer of the buttock area.   Patient states that last night was on her toilet which cracked while she was sitting on it, and patient was unable to get off the toilet for 4 hours.  By the time EMS arrived skin had ulcerated, with a blister that ruptured predominantly to the left buttock area (appears to be like burn blister that ruptured) with linear abrasions to the bilateral upper thighs/buttocks.  Patient in so much pain that she cannot stand or walk,    # Bilateral buttocks and sacrum deep Tissue Injury  wound team input appreciated  ID inpout appreciated , hold antibiotics -  wounds without any gross signs of infection    - Cleanse wound to bilateral buttocks and sacrum with Vashe wound cleanser. Pat dry, apply Silvadene, cover with Xeroform and abdominal pad twice a day and prn for soiling.   frequent turning, off loading,and positioning and skin care as per protocol, Maintain pressure injury prevention, Keep skin clean, Offload heels, Monitor wound for changes and notify provider if any   nutrition support     # Acute kidney inury on CKD stage 4, prerenal  # Rhabdomylosis- resolved  # Hyperkalemia  - Creatine Kinase normalized   - US Kidney and Bladder (07.30.24 @ 07:49) >No definite hydronephrosis  - S/p IV fluids  nephro on board - recc appreciated   - c/w  lasix   - monitor UO  - monitor BMP  - low k diet  - lokelma 10 q 24 if repeated k > 5.5 as per nephro   - monitor k  Monitor phosphoros     # Peripheral neuropathy  - c/w  neurontin  - F/u  B12, folate   - evaluated by PT : STR    # Acute on chronic  anemia  # Non erosive gastritis  # Severe diverticulosis  # Grade/Stage I external hemorrhoids  # H/o  AVMs  # Colon polyps  # History of perisplenic Varices in 2023   -  EGD-Colonoscopy (07.22.24 @ 12:30) >Erythema in the stomach compatible with non-erosive gastritis. 2 non-bleeding 7mm AVMs were seen at the second portion of the duodenum cauterization was successfully applied to ensurehemostasis. Polyps (5 mm) in the ascending colon. (Polypectomy). Polyps (10 mm) in the ascending colon. (Polypectomy). Polyp (10 mm) in the ascending colon. (Polypectomy, Endoclip). 3 non-bleeding AVMs ranging in size between 7-10mm were seen in the cecum.Using APC probe, cauterization was successfully performed to ensure hemostasis. 3 non-bleeding AVMs ranging in size between 7-105 mm were seen in theascending colon. Using APC probe, cauterization was successfully performed to ensure hemostasis. Severe diverticulosis of the the left side of the colon. Grade/Stage I external hemorrhoids.  - c/w protonix  - s/p 1U pRBC on 8/2   completed course  IV Venofer - f/u with nephro for  WOODY   start b12 and folate replacement for vit b12 and folate deficiency  op hematology and GI    keep type and screen active     # Chronic diastolic CHF  # Severe aortic stenosis   -  TTE Echo Complete w/o Contrast w/ Doppler (08.01.24 @ 11:03) >EF of 56 %. Mild asymmetric left ventricular hypertrophy. Grade II diastolic dysfunction). Severe aortic valve stenosis , Mild to moderate mitral valve regurgitation. Moderate aortic regurgitation.  - Patient to follow up with Dr. Hernandez as an outpatient for TAVR  - c/w metoprolol, lasix    # Thrombocytopenia- resolved    # DVT prophylaxis- HEPARIN SQ- get LE duplex     # Full code    # Pending: monitor BMP, CBC , , auth for STR  # Disposition: STR- PT eval

## 2024-08-07 NOTE — PROGRESS NOTE ADULT - ASSESSMENT
78F PMH CKD, HTN, CCY, gout, severe AS being planned for tAVR, and history of severe anemia secondary to chronic upper GI bleeding due to AV malformation gastric body dieulafoy lesion, weekly blood transfusions who presented for a ruptured pressure ulcer of the buttock area. Rupture occurred while on the toilet and patient was unable to stand for 4 hours. By the time EMS arrived skin had ulcerated, with a blister that ruptured predominantly to the left buttock area (appears to be like burn blister that ruptured) with linear abrasions to the bilateral upper thighs/buttocks.    # Ruptured Bilateral buttocks and sacrum deep Tissue Injury  - per wound recs: Cleanse wound to bilateral buttocks and sacrum with Vashe wound cleanser. Pat dry, apply Silvadene, cover with Xeroform and abdominal pad twice a day and prn for soiling  - Hg stable  -f/u nutrition consult    # Acute on Chronic normocytic DARRIN   # History of AVMs (gastric and duodenal) and Dieulafoy Lesions Status Post Hemostasis in 2023  # History of Perisplenic Varices in 2023   - on weekly IV transfusion/iron infusions per hematology  -  7/22: EGD and Colonoscopy: Erythema in the stomach compatible with non-erosive gastritis. 2 non-bleeding 7mm AVMs were seen at the second portion of the duodenum cauterization was successfully applied to ensurehemostasis. Polyps (5 mm) in the ascending colon. (Polypectomy). Polyps (10 mm) in the ascending colon. (Polypectomy). Polyp (10 mm) in the ascending colon. (Polypectomy, Endoclip). 3 non-bleeding AVMs ranging in size between 7-10mm were seen in the cecum.Using APC probe, cauterization was successfully performed to ensure hemostasis. 3 non-bleeding AVMs ranging in size between 7-105 mm were seen in theascending colon. Using APC probe, cauterization was successfully performed to ensure hemostasis. Severe diverticulosis of the the left side of the colon. Grade/Stage I external hemorrhoids.  - s/p 1U pRBC on 8/2. Hg stable   - completed course of venofer total of 1 g today 8/7 ; will need WOODY   - c/w protonix    # EVAN on stage 4 CKD  - Pt had elevated CK on 7/30/24 to 2766 with concern for rhabdo--> CK has since improved to 46 8/6/24  - Elevated BUN/Cr: continue to trend  - 7/30: KUB: No definite hydronephrosis  - S/p IV fluids  - Lasix resumed 8/5  - Monitor I/O  - Low K diet, monitor BMP: last K 5 so no lokelma given 8/7  - Nephro following    #Diastolic heart failure   # Severe aortic stenosis being planned for TAVR (oupt f/u: Dr. Hernandez)  -  8/1: TTE: EF- 56%. Mild asymmetric left ventricular hypertrophy. Grade II diastolic dysfunction. Severe aortic valve stenosis. Mild to moderate mitral valve regurgitation. Moderate aortic regurgitation.  - c/w metoprolol    # Peripheral neuropathy  - c/w home gabapentin 100  - B12: 231, folate: 2 - replete  - evaluated by PT 7/31: STR    # Thrombocytopenia  - monitor platelets    #Pitting edema worse on rt  - f/u duplex b/l LE    DVT PPX: heparin   GI PPX: pantoprazole   DIET: DASH  CODE STATUS: full   DISPOSITION: STR    Pending: f/u BUN/Cr, electrolytes- replete as needed, f/u b/l LE duplex    78F PMH CKD, HTN, CCY, gout, severe AS being planned for tAVR, and history of severe anemia secondary to chronic upper GI bleeding due to AV malformation gastric body dieulafoy lesion, weekly blood transfusions who presented for a ruptured pressure ulcer of the buttock area. Rupture occurred while on the toilet and patient was unable to stand for 4 hours. By the time EMS arrived skin had ulcerated, with a blister that ruptured predominantly to the left buttock area (appears to be like burn blister that ruptured) with linear abrasions to the bilateral upper thighs/buttocks.    # Ruptured Bilateral buttocks and sacrum deep Tissue Injury  - per wound recs: Cleanse wound to bilateral buttocks and sacrum with Vashe wound cleanser. Pat dry, apply Silvadene, cover with Xeroform and abdominal pad twice a day and prn for soiling  - Hg stable  -f/u nutrition consult    # Acute on Chronic normocytic DARRIN   # History of AVMs (gastric and duodenal) and Dieulafoy Lesions Status Post Hemostasis in 2023  # History of Perisplenic Varices in 2023   - on weekly IV transfusion/iron infusions per hematology  -  7/22: EGD and Colonoscopy: Erythema in the stomach compatible with non-erosive gastritis. 2 non-bleeding 7mm AVMs were seen at the second portion of the duodenum cauterization was successfully applied to ensurehemostasis. Polyps (5 mm) in the ascending colon. (Polypectomy). Polyps (10 mm) in the ascending colon. (Polypectomy). Polyp (10 mm) in the ascending colon. (Polypectomy, Endoclip). 3 non-bleeding AVMs ranging in size between 7-10mm were seen in the cecum.Using APC probe, cauterization was successfully performed to ensure hemostasis. 3 non-bleeding AVMs ranging in size between 7-105 mm were seen in theascending colon. Using APC probe, cauterization was successfully performed to ensure hemostasis. Severe diverticulosis of the the left side of the colon. Grade/Stage I external hemorrhoids.  - s/p 1U pRBC on 8/2. Hg stable   - completed course of venofer total of 1 g today 8/7 ; will need WOODY   - c/w protonix    # EVAN on stage 4 CKD  - Pt had elevated CK on 7/30/24 to 2766 with concern for rhabdo--> CK has since improved to 46 8/6/24  - Elevated BUN/Cr: continue to trend  - 7/30: KUB: No definite hydronephrosis  - S/p IV fluids  - Lasix resumed 8/5  - Monitor I/O  - Low K diet, monitor BMP: last K 5 so no lokelma given 8/7  - Nephro following    #Diastolic heart failure   # Severe aortic stenosis being planned for TAVR (oupt f/u: Dr. Hernandez)  -  8/1: TTE: EF- 56%. Mild asymmetric left ventricular hypertrophy. Grade II diastolic dysfunction. Severe aortic valve stenosis. Mild to moderate mitral valve regurgitation. Moderate aortic regurgitation.  - c/w metoprolol    # Peripheral neuropathy  - c/w home gabapentin 100  - B12: 231, folate: 2 - replete, f/u outpt workup for deficiencies  - evaluated by PT 7/31: STR    # Thrombocytopenia  - monitor platelets    #Pitting edema worse on rt  - f/u duplex b/l LE    DVT PPX: heparin   GI PPX: pantoprazole   DIET: DASH  CODE STATUS: full   DISPOSITION: STR    Pending: f/u BUN/Cr, electrolytes- replete as needed, f/u b/l LE duplex

## 2024-08-07 NOTE — PROGRESS NOTE ADULT - SUBJECTIVE AND OBJECTIVE BOX
SUBJECTIVE/OVERNIGHT EVENTS  Today is hospital day 9d. This morning patient was seen and examined at bedside, resting comfortably in bed. No acute or major events overnight. Pt endorses feeling better.       MEDICATIONS  STANDING MEDICATIONS  furosemide    Tablet 40 milliGRAM(s) Oral daily  gabapentin 100 milliGRAM(s) Oral daily  heparin   Injectable 5000 Unit(s) SubCutaneous every 12 hours  iron sucrose IVPB 200 milliGRAM(s) IV Intermittent every 24 hours  metoprolol tartrate 25 milliGRAM(s) Oral daily  pantoprazole    Tablet 40 milliGRAM(s) Oral before breakfast  polyethylene glycol 3350 17 Gram(s) Oral daily  senna 2 Tablet(s) Oral at bedtime  silver sulfADIAZINE 1% Cream 1 Application(s) Topical daily    PRN MEDICATIONS  acetaminophen     Tablet .. 650 milliGRAM(s) Oral every 6 hours PRN    VITALS  T(F): 98.1 (08-06-24 @ 15:35), Max: 98.1 (08-06-24 @ 15:35)  HR: 69 (08-07-24 @ 05:45) (69 - 79)  BP: 112/68 (08-07-24 @ 05:45) (110/64 - 112/68)  RR: 18 (08-06-24 @ 15:35) (18 - 18)  SpO2: 99% (08-06-24 @ 15:35) (99% - 99%)    PHYSICAL EXAM  General: NAD, non-toxic appearing  HEENT: EOMi, no scleral icterus  CV: systolic murmur  Lungs: normal respiratory effort, CTAB  Abd: Soft, nontender, nondistended  Ext: b/l pitting edema worse on right  Psych: A+Ox3, appropriate affect  Neuro: grossly non-focal, moving all extremities spontaneously  Skin: ruptured pressure ulcer of buttocks       LABS             7.4    6.66  )-----------( 143      ( 08-06-24 @ 10:35 )             24.7     139  |  106  |  89  -------------------------<  103   08-06-24 @ 10:35  5.6  |  23  |  2.5    Ca      8.0     08-06-24 @ 10:35  Mg     2.4     08-06-24 @ 10:35    TPro  4.4  /  Alb  2.6  /  TBili  0.4  /  DBili  x   /  AST  16  /  ALT  15  /  AlkPhos  71  /  GGT  x     08-06-24 @ 10:35        Urinalysis Basic - ( 06 Aug 2024 10:35 )    Color: x / Appearance: x / SG: x / pH: x  Gluc: 103 mg/dL / Ketone: x  / Bili: x / Urobili: x   Blood: x / Protein: x / Nitrite: x   Leuk Esterase: x / RBC: x / WBC x   Sq Epi: x / Non Sq Epi: x / Bacteria: x          IMAGING   SUBJECTIVE/OVERNIGHT EVENTS  Today is hospital day 9d. This morning patient was seen and examined at bedside, resting comfortably in bed. No acute or major events overnight. Pt endorses feeling better.       MEDICATIONS  STANDING MEDICATIONS  furosemide    Tablet 40 milliGRAM(s) Oral daily  gabapentin 100 milliGRAM(s) Oral daily  heparin   Injectable 5000 Unit(s) SubCutaneous every 12 hours  iron sucrose IVPB 200 milliGRAM(s) IV Intermittent every 24 hours  metoprolol tartrate 25 milliGRAM(s) Oral daily  pantoprazole    Tablet 40 milliGRAM(s) Oral before breakfast  polyethylene glycol 3350 17 Gram(s) Oral daily  senna 2 Tablet(s) Oral at bedtime  silver sulfADIAZINE 1% Cream 1 Application(s) Topical daily    PRN MEDICATIONS  acetaminophen     Tablet .. 650 milliGRAM(s) Oral every 6 hours PRN    VITALS  T(F): 98.1 (08-06-24 @ 15:35), Max: 98.1 (08-06-24 @ 15:35)  HR: 69 (08-07-24 @ 05:45) (69 - 79)  BP: 112/68 (08-07-24 @ 05:45) (110/64 - 112/68)  RR: 18 (08-06-24 @ 15:35) (18 - 18)  SpO2: 99% (08-06-24 @ 15:35) (99% - 99%)    PHYSICAL EXAM  General: NAD, non-toxic appearing  HEENT: EOMi, no scleral icterus  CV: systolic murmur  Lungs: normal respiratory effort, CTAB  Abd: Soft, nontender, nondistended  Ext: b/l pitting edema worse on right  Psych: A+Ox3, appropriate affect  Neuro: grossly non-focal, moving all extremities spontaneously  Skin: ruptured pressure ulcer of buttocks       LABS             7.4    6.66  )-----------( 143      ( 08-06-24 @ 10:35 )             24.7     139  |  106  |  89  -------------------------<  103   08-06-24 @ 10:35  5.6  |  23  |  2.5    Ca      8.0     08-06-24 @ 10:35  Mg     2.4     08-06-24 @ 10:35    TPro  4.4  /  Alb  2.6  /  TBili  0.4  /  DBili  x   /  AST  16  /  ALT  15  /  AlkPhos  71  /  GGT  x     08-06-24 @ 10:35        Urinalysis Basic - ( 06 Aug 2024 10:35 )    Color: x / Appearance: x / SG: x / pH: x  Gluc: 103 mg/dL / Ketone: x  / Bili: x / Urobili: x   Blood: x / Protein: x / Nitrite: x   Leuk Esterase: x / RBC: x / WBC x   Sq Epi: x / Non Sq Epi: x / Bacteria: x

## 2024-08-07 NOTE — PROGRESS NOTE ADULT - SUBJECTIVE AND OBJECTIVE BOX
LATRICIA ARREAGA  78y, Female  Allergy: aspirin (Rash; Other)  penicillins (Rash; Urticaria; Hives; Blisters)  latex (Unknown)  EKG leads (Hives)    Hospital Day: 9d    Patient seen and examined earlier today.  she stated that over all she doesnt feel well, chronic intermittent back and leg pain     PMH/PSH:  PAST MEDICAL & SURGICAL HISTORY:  HTN (hypertension)      Heart murmur      Eczema      Anemia  iron infusions and PRBC transfusions      Aortic stenosis      Chronic kidney disease (CKD)      2019 novel coronavirus disease (COVID-19)  2020- [facility] admission      Gastric AVM      H/O appendicitis      S/P cholecystectomy      History of esophagogastroduodenoscopy (EGD)      H/O colonoscopy      H/O  section      History of appendectomy      Port-A-Cath in place  right CW          LAST 24-Hr EVENTS:    VITALS:  T(F): 97.7 (24 @ 15:49), Max: 97.7 (24 @ 15:49)  HR: 76 (24 @ 15:49)  BP: 102/60 (24 @ 15:49) (102/60 - 112/68)  RR: 18 (24 @ 15:49)  SpO2: 98% (24 @ 15:49)          TESTS & MEASUREMENTS:  Weight/BMI      24 @ 07:01  -  24 @ 07:00  --------------------------------------------------------  IN: 354 mL / OUT: 800 mL / NET: -446 mL    24 @ 07:01  -  24 @ 17:03  --------------------------------------------------------  IN: 250 mL / OUT: 600 mL / NET: -350 mL                            7.1    5.87  )-----------( 146      ( 07 Aug 2024 13:47 )             23.5         08-07    140  |  107  |  99<HH>  ----------------------------<  108<H>  5.0   |  23  |  2.6<H>    Ca    7.9<L>      07 Aug 2024 13:47  Phos  5.1       Mg     2.5         TPro  4.2<L>  /  Alb  2.5<L>  /  TBili  0.3  /  DBili  x   /  AST  14  /  ALT  13  /  AlkPhos  68      LIVER FUNCTIONS - ( 07 Aug 2024 13:47 )  Alb: 2.5 g/dL / Pro: 4.2 g/dL / ALK PHOS: 68 U/L / ALT: 13 U/L / AST: 14 U/L / GGT: x                 Urinalysis Basic - ( 07 Aug 2024 13:47 )    Color: x / Appearance: x / SG: x / pH: x  Gluc: 108 mg/dL / Ketone: x  / Bili: x / Urobili: x   Blood: x / Protein: x / Nitrite: x   Leuk Esterase: x / RBC: x / WBC x   Sq Epi: x / Non Sq Epi: x / Bacteria: x          Ferritin: 170 ng/mL (24 @ 14:29)                    RADIOLOGY, ECG, & ADDITIONAL TESTS:  12 Lead ECG:   Ventricular Rate 78 BPM    Atrial Rate 78 BPM    P-R Interval 186 ms    QRS Duration 94 ms    Q-T Interval 438 ms    QTC Calculation(Bazett) 499 ms    P Axis 70 degrees    R Axis -42 degrees    T Axis -35 degrees    Diagnosis Line Sinus rhythm withfrequent Premature ventricular complexes  Left axis deviation  Incomplete right bundle branch block  Minimal voltage criteria for LVH, may be normal variant  ST & T wave abnormality, consider inferior ischemia  Abnormal ECG    Confirmed by El Zhao ([id number]) on 2024 6:30:32 PM (24 @ 16:05)      RECENT DIAGNOSTIC ORDERS:  VA Duplex Lower Ext Vein Scan, Bilat: 11:44 (24 @ 11:47)  Magnesium: AM Sched. Collection: 12-Aug-2024 04:30 (24 @ 17:40)  Magnesium: AM Sched. Collection: 11-Aug-2024 04:30 (24 @ 17:40)  Magnesium: AM Sched. Collection: 10-Aug-2024 04:30 (24 @ 17:40)  Magnesium: AM Sched. Collection: 09-Aug-2024 04:30 (24 @ 17:40)  Magnesium: AM Sched. Collection: 08-Aug-2024 04:30 (24 @ 17:40)  Phosphorus: AM Sched. Collection: 12-Aug-2024 04:30 (24 @ 17:40)  Phosphorus: AM Sched. Collection: 11-Aug-2024 04:30 (24 @ 17:40)  Phosphorus: AM Sched. Collection: 10-Aug-2024 04:30 (24 @ 17:40)  Phosphorus: AM Sched. Collection: 09-Aug-2024 04:30 (24 @ 17:40)  Phosphorus: AM Sched. Collection: 08-Aug-2024 04:30 (24 @ 17:40)  Comprehensive Metabolic Panel: AM Sched. Collection: 12-Aug-2024 04:30 (24 @ 17:40)  Comprehensive Metabolic Panel: AM Sched. Collection: 11-Aug-2024 04:30 (24 @ 17:40)  Comprehensive Metabolic Panel: AM Sched. Collection: 10-Aug-2024 04:30 (24 @ 17:40)  Comprehensive Metabolic Panel: AM Sched. Collection: 09-Aug-2024 04:30 (24 @ 17:40)  Comprehensive Metabolic Panel: AM Sched. Collection: 08-Aug-2024 04:30 (24 @ 17:40)  Complete Blood Count + Automated Diff: AM Sched. Collection: 12-Aug-2024 04:30 (24 @ 17:40)  Complete Blood Count + Automated Diff: AM Sched. Collection: 11-Aug-2024 04:30 (24 @ 17:40)  Complete Blood Count + Automated Diff: AM Sched. Collection: 10-Aug-2024 04:30 (24 @ 17:40)  Complete Blood Count + Automated Diff: AM Sched. Collection: 09-Aug-2024 04:30 (24 @ 17:40)  Complete Blood Count + Automated Diff: AM Sched. Collection: 08-Aug-2024 04:30 (24 @ 17:40)      MEDICATIONS:  MEDICATIONS  (STANDING):  cyanocobalamin 1000 MICROGram(s) Oral daily  folic acid 1 milliGRAM(s) Oral daily  furosemide    Tablet 40 milliGRAM(s) Oral daily  gabapentin 100 milliGRAM(s) Oral daily  heparin   Injectable 5000 Unit(s) SubCutaneous every 12 hours  metoprolol tartrate 25 milliGRAM(s) Oral daily  pantoprazole    Tablet 40 milliGRAM(s) Oral before breakfast  polyethylene glycol 3350 17 Gram(s) Oral daily  senna 2 Tablet(s) Oral at bedtime  silver sulfADIAZINE 1% Cream 1 Application(s) Topical daily    MEDICATIONS  (PRN):  acetaminophen     Tablet .. 650 milliGRAM(s) Oral every 6 hours PRN Mild Pain (1 - 3)      HOME MEDICATIONS:  furosemide 40 mg oral tablet ()  metoprolol tartrate 25 mg oral tablet ()  pantoprazole 40 mg oral delayed release tablet ()  silver sulfADIAZINE 1% topical cream ()      PHYSICAL EXAM:  On exam  General: awake, alert, NAD, chronic ill appearance  Lungs:  clear to ausculations b/l, normal resp effort  Heart: regular ryhthm   Abdomen: soft, non tender non distended  Ext: no edema, can move all  his extremities              LATRICIA ARREAGA  78y, Female  Allergy: aspirin (Rash; Other)  penicillins (Rash; Urticaria; Hives; Blisters)  latex (Unknown)  EKG leads (Hives)    Hospital Day: 9d    Patient seen and examined earlier today.  she stated that over all she doesnt feel well, chronic intermittent back and leg pain     PMH/PSH:  PAST MEDICAL & SURGICAL HISTORY:  HTN (hypertension)      Heart murmur      Eczema      Anemia  iron infusions and PRBC transfusions      Aortic stenosis      Chronic kidney disease (CKD)      2019 novel coronavirus disease (COVID-19)  2020- [facility] admission      Gastric AVM      H/O appendicitis      S/P cholecystectomy      History of esophagogastroduodenoscopy (EGD)      H/O colonoscopy      H/O  section      History of appendectomy      Port-A-Cath in place  right CW          LAST 24-Hr EVENTS:    VITALS:  T(F): 97.7 (24 @ 15:49), Max: 97.7 (24 @ 15:49)  HR: 76 (24 @ 15:49)  BP: 102/60 (24 @ 15:49) (102/60 - 112/68)  RR: 18 (24 @ 15:49)  SpO2: 98% (24 @ 15:49)          TESTS & MEASUREMENTS:  Weight/BMI      24 @ 07:01  -  24 @ 07:00  --------------------------------------------------------  IN: 354 mL / OUT: 800 mL / NET: -446 mL    24 @ 07:01  -  24 @ 17:03  --------------------------------------------------------  IN: 250 mL / OUT: 600 mL / NET: -350 mL                            7.1    5.87  )-----------( 146      ( 07 Aug 2024 13:47 )             23.5         08-07    140  |  107  |  99<HH>  ----------------------------<  108<H>  5.0   |  23  |  2.6<H>    Ca    7.9<L>      07 Aug 2024 13:47  Phos  5.1       Mg     2.5         TPro  4.2<L>  /  Alb  2.5<L>  /  TBili  0.3  /  DBili  x   /  AST  14  /  ALT  13  /  AlkPhos  68      LIVER FUNCTIONS - ( 07 Aug 2024 13:47 )  Alb: 2.5 g/dL / Pro: 4.2 g/dL / ALK PHOS: 68 U/L / ALT: 13 U/L / AST: 14 U/L / GGT: x                 Urinalysis Basic - ( 07 Aug 2024 13:47 )    Color: x / Appearance: x / SG: x / pH: x  Gluc: 108 mg/dL / Ketone: x  / Bili: x / Urobili: x   Blood: x / Protein: x / Nitrite: x   Leuk Esterase: x / RBC: x / WBC x   Sq Epi: x / Non Sq Epi: x / Bacteria: x          Ferritin: 170 ng/mL (24 @ 14:29)                    RADIOLOGY, ECG, & ADDITIONAL TESTS:  12 Lead ECG:   Ventricular Rate 78 BPM    Atrial Rate 78 BPM    P-R Interval 186 ms    QRS Duration 94 ms    Q-T Interval 438 ms    QTC Calculation(Bazett) 499 ms    P Axis 70 degrees    R Axis -42 degrees    T Axis -35 degrees    Diagnosis Line Sinus rhythm withfrequent Premature ventricular complexes  Left axis deviation  Incomplete right bundle branch block  Minimal voltage criteria for LVH, may be normal variant  ST & T wave abnormality, consider inferior ischemia  Abnormal ECG    Confirmed by El Zhao ([id number]) on 2024 6:30:32 PM (24 @ 16:05)      RECENT DIAGNOSTIC ORDERS:  VA Duplex Lower Ext Vein Scan, Bilat: 11:44 (24 @ 11:47)  Magnesium: AM Sched. Collection: 12-Aug-2024 04:30 (24 @ 17:40)  Magnesium: AM Sched. Collection: 11-Aug-2024 04:30 (24 @ 17:40)  Magnesium: AM Sched. Collection: 10-Aug-2024 04:30 (24 @ 17:40)  Magnesium: AM Sched. Collection: 09-Aug-2024 04:30 (24 @ 17:40)  Magnesium: AM Sched. Collection: 08-Aug-2024 04:30 (24 @ 17:40)  Phosphorus: AM Sched. Collection: 12-Aug-2024 04:30 (24 @ 17:40)  Phosphorus: AM Sched. Collection: 11-Aug-2024 04:30 (24 @ 17:40)  Phosphorus: AM Sched. Collection: 10-Aug-2024 04:30 (24 @ 17:40)  Phosphorus: AM Sched. Collection: 09-Aug-2024 04:30 (24 @ 17:40)  Phosphorus: AM Sched. Collection: 08-Aug-2024 04:30 (24 @ 17:40)  Comprehensive Metabolic Panel: AM Sched. Collection: 12-Aug-2024 04:30 (24 @ 17:40)  Comprehensive Metabolic Panel: AM Sched. Collection: 11-Aug-2024 04:30 (24 @ 17:40)  Comprehensive Metabolic Panel: AM Sched. Collection: 10-Aug-2024 04:30 (24 @ 17:40)  Comprehensive Metabolic Panel: AM Sched. Collection: 09-Aug-2024 04:30 (24 @ 17:40)  Comprehensive Metabolic Panel: AM Sched. Collection: 08-Aug-2024 04:30 (24 @ 17:40)  Complete Blood Count + Automated Diff: AM Sched. Collection: 12-Aug-2024 04:30 (24 @ 17:40)  Complete Blood Count + Automated Diff: AM Sched. Collection: 11-Aug-2024 04:30 (24 @ 17:40)  Complete Blood Count + Automated Diff: AM Sched. Collection: 10-Aug-2024 04:30 (24 @ 17:40)  Complete Blood Count + Automated Diff: AM Sched. Collection: 09-Aug-2024 04:30 (24 @ 17:40)  Complete Blood Count + Automated Diff: AM Sched. Collection: 08-Aug-2024 04:30 (24 @ 17:40)      MEDICATIONS:  MEDICATIONS  (STANDING):  cyanocobalamin 1000 MICROGram(s) Oral daily  folic acid 1 milliGRAM(s) Oral daily  furosemide    Tablet 40 milliGRAM(s) Oral daily  gabapentin 100 milliGRAM(s) Oral daily  heparin   Injectable 5000 Unit(s) SubCutaneous every 12 hours  metoprolol tartrate 25 milliGRAM(s) Oral daily  pantoprazole    Tablet 40 milliGRAM(s) Oral before breakfast  polyethylene glycol 3350 17 Gram(s) Oral daily  senna 2 Tablet(s) Oral at bedtime  silver sulfADIAZINE 1% Cream 1 Application(s) Topical daily    MEDICATIONS  (PRN):  acetaminophen     Tablet .. 650 milliGRAM(s) Oral every 6 hours PRN Mild Pain (1 - 3)      HOME MEDICATIONS:  furosemide 40 mg oral tablet ()  metoprolol tartrate 25 mg oral tablet ()  pantoprazole 40 mg oral delayed release tablet ()  silver sulfADIAZINE 1% topical cream ()      PHYSICAL EXAM:  On exam  General: awake, alert, NAD, chronic ill appearance  Lungs:  clear to ausculations b/l, normal resp effort  Heart: regular ryhthm   Abdomen: soft, non tender non distended  Ext: + edema, can move all  his extremities

## 2024-08-07 NOTE — PROGRESS NOTE ADULT - ASSESSMENT
78-year-old female with past medical history of   CKD, HTN, CCY, gout,  severe AS being planned for TAVR, and history of severe anemia secondary to chronic upper GI bleeding due to  AV malformation gastric body dieulafoy lesion, weekly blood transfusions presenting to ER for a rupture pressure ulcer of the buttock area.  #EVAN on CKD4  - creat trending down  / please repeat labs   - no hydro on renal/bladder sono   -  non oliguric   - bladder scan q shift   - continue lasix 40  -  last Phos 4.1 at goal  - start lokelma 10 q 24 if repeated k > 5.5 / low k diet   - complete course of venofer total of 1 g ; will need WOODY once completed  no indication for RRT   will follow

## 2024-08-07 NOTE — PROGRESS NOTE ADULT - SUBJECTIVE AND OBJECTIVE BOX
seen and examined  24 h events noted   no distress         PAST HISTORY  --------------------------------------------------------------------------------  No significant changes to PMH, PSH, FHx, SHx, unless otherwise noted    ALLERGIES & MEDICATIONS  --------------------------------------------------------------------------------  Allergies    aspirin (Rash; Other)  penicillins (Rash; Urticaria; Hives; Blisters)  latex (Unknown)  EKG leads (Hives)    Intolerances      Standing Inpatient Medications  furosemide    Tablet 40 milliGRAM(s) Oral daily  gabapentin 100 milliGRAM(s) Oral daily  heparin   Injectable 5000 Unit(s) SubCutaneous every 12 hours  iron sucrose IVPB 200 milliGRAM(s) IV Intermittent every 24 hours  metoprolol tartrate 25 milliGRAM(s) Oral daily  pantoprazole    Tablet 40 milliGRAM(s) Oral before breakfast  polyethylene glycol 3350 17 Gram(s) Oral daily  senna 2 Tablet(s) Oral at bedtime  silver sulfADIAZINE 1% Cream 1 Application(s) Topical daily    PRN Inpatient Medications  acetaminophen     Tablet .. 650 milliGRAM(s) Oral every 6 hours PRN          VITALS/PHYSICAL EXAM  --------------------------------------------------------------------------------  T(C): 36.7 (08-06-24 @ 15:35), Max: 36.7 (08-06-24 @ 15:35)  HR: 69 (08-07-24 @ 05:45) (69 - 79)  BP: 112/68 (08-07-24 @ 05:45) (110/64 - 112/68)  RR: 18 (08-06-24 @ 15:35) (18 - 18)  SpO2: 99% (08-06-24 @ 15:35) (99% - 99%)  Wt(kg): --        08-06-24 @ 07:01  -  08-07-24 @ 07:00  --------------------------------------------------------  IN: 354 mL / OUT: 800 mL / NET: -446 mL      Physical Exam:  	Gen: NAD  	Pulm: decrease BS  B/L  	CV:  S1S2; no rub  	Abd: +distended      LABS/STUDIES  --------------------------------------------------------------------------------              7.4    6.66  >-----------<  143      [08-06-24 @ 10:35]              24.7     139  |  106  |  89  ----------------------------<  103      [08-06-24 @ 10:35]  5.6   |  23  |  2.5        Ca     8.0     [08-06-24 @ 10:35]      Mg     2.4     [08-06-24 @ 10:35]    TPro  4.4  /  Alb  2.6  /  TBili  0.4  /  DBili  x   /  AST  16  /  ALT  15  /  AlkPhos  71  [08-06-24 @ 10:35]        Creatinine Trend:  SCr 2.5 [08-06 @ 10:35]  SCr 3.0 [08-03 @ 10:19]  SCr 3.1 [08-02 @ 11:04]  SCr 3.2 [08-02 @ 10:20]  SCr 3.2 [08-01 @ 18:45]    Urinalysis - [08-06-24 @ 10:35]      Color  / Appearance  / SG  / pH       Gluc 103 / Ketone   / Bili  / Urobili        Blood  / Protein  / Leuk Est  / Nitrite       RBC  / WBC  / Hyaline  / Gran  / Sq Epi  / Non Sq Epi  / Bacteria     Urine Creatinine 67      [07-31-24 @ 11:00]  Urine Protein 5      [07-31-24 @ 11:00]  Urine Sodium 35.0      [07-31-24 @ 11:00]  Urine Urea Nitrogen 633      [07-31-24 @ 11:00]  Urine Potassium 31      [07-31-24 @ 11:00]  Urine Osmolality 380      [07-31-24 @ 11:00]    Iron 23, TIBC 163, %sat 14      [08-01-24 @ 14:29]  Ferritin 170      [08-01-24 @ 14:29]

## 2024-08-08 DIAGNOSIS — N17.9 ACUTE KIDNEY FAILURE, UNSPECIFIED: ICD-10-CM

## 2024-08-08 DIAGNOSIS — L89.90 PRESSURE ULCER OF UNSPECIFIED SITE, UNSPECIFIED STAGE: ICD-10-CM

## 2024-08-08 DIAGNOSIS — Z51.5 ENCOUNTER FOR PALLIATIVE CARE: ICD-10-CM

## 2024-08-08 LAB
ALBUMIN SERPL ELPH-MCNC: 2.5 G/DL — LOW (ref 3.5–5.2)
ALP SERPL-CCNC: 68 U/L — SIGNIFICANT CHANGE UP (ref 30–115)
ALT FLD-CCNC: 12 U/L — SIGNIFICANT CHANGE UP (ref 0–41)
ANION GAP SERPL CALC-SCNC: 12 MMOL/L — SIGNIFICANT CHANGE UP (ref 7–14)
ANION GAP SERPL CALC-SCNC: 9 MMOL/L — SIGNIFICANT CHANGE UP (ref 7–14)
AST SERPL-CCNC: 15 U/L — SIGNIFICANT CHANGE UP (ref 0–41)
BASE EXCESS BLDA CALC-SCNC: -0.8 MMOL/L — SIGNIFICANT CHANGE UP (ref -2–3)
BASOPHILS # BLD AUTO: 0.01 K/UL — SIGNIFICANT CHANGE UP (ref 0–0.2)
BASOPHILS NFR BLD AUTO: 0.2 % — SIGNIFICANT CHANGE UP (ref 0–1)
BILIRUB SERPL-MCNC: 0.5 MG/DL — SIGNIFICANT CHANGE UP (ref 0.2–1.2)
BUN SERPL-MCNC: 103 MG/DL — CRITICAL HIGH (ref 10–20)
BUN SERPL-MCNC: 108 MG/DL — CRITICAL HIGH (ref 10–20)
CALCIUM SERPL-MCNC: 8 MG/DL — LOW (ref 8.4–10.5)
CALCIUM SERPL-MCNC: 8 MG/DL — LOW (ref 8.4–10.5)
CHLORIDE SERPL-SCNC: 107 MMOL/L — SIGNIFICANT CHANGE UP (ref 98–110)
CHLORIDE SERPL-SCNC: 108 MMOL/L — SIGNIFICANT CHANGE UP (ref 98–110)
CO2 SERPL-SCNC: 22 MMOL/L — SIGNIFICANT CHANGE UP (ref 17–32)
CO2 SERPL-SCNC: 24 MMOL/L — SIGNIFICANT CHANGE UP (ref 17–32)
CREAT SERPL-MCNC: 2.7 MG/DL — HIGH (ref 0.7–1.5)
CREAT SERPL-MCNC: 2.8 MG/DL — HIGH (ref 0.7–1.5)
EGFR: 17 ML/MIN/1.73M2 — LOW
EGFR: 17 ML/MIN/1.73M2 — LOW
EOSINOPHIL # BLD AUTO: 0.58 K/UL — SIGNIFICANT CHANGE UP (ref 0–0.7)
EOSINOPHIL NFR BLD AUTO: 9.5 % — HIGH (ref 0–8)
GLUCOSE SERPL-MCNC: 104 MG/DL — HIGH (ref 70–99)
GLUCOSE SERPL-MCNC: 127 MG/DL — HIGH (ref 70–99)
HCO3 BLDA-SCNC: 25 MMOL/L — SIGNIFICANT CHANGE UP (ref 21–28)
HCT VFR BLD CALC: 24 % — LOW (ref 37–47)
HGB BLD-MCNC: 7.2 G/DL — LOW (ref 12–16)
IMM GRANULOCYTES NFR BLD AUTO: 0.7 % — HIGH (ref 0.1–0.3)
LYMPHOCYTES # BLD AUTO: 0.65 K/UL — LOW (ref 1.2–3.4)
LYMPHOCYTES # BLD AUTO: 10.7 % — LOW (ref 20.5–51.1)
MAGNESIUM SERPL-MCNC: 2.6 MG/DL — HIGH (ref 1.8–2.4)
MCHC RBC-ENTMCNC: 29.6 PG — SIGNIFICANT CHANGE UP (ref 27–31)
MCHC RBC-ENTMCNC: 30 G/DL — LOW (ref 32–37)
MCV RBC AUTO: 98.8 FL — SIGNIFICANT CHANGE UP (ref 81–99)
MONOCYTES # BLD AUTO: 0.45 K/UL — SIGNIFICANT CHANGE UP (ref 0.1–0.6)
MONOCYTES NFR BLD AUTO: 7.4 % — SIGNIFICANT CHANGE UP (ref 1.7–9.3)
NEUTROPHILS # BLD AUTO: 4.36 K/UL — SIGNIFICANT CHANGE UP (ref 1.4–6.5)
NEUTROPHILS NFR BLD AUTO: 71.5 % — SIGNIFICANT CHANGE UP (ref 42.2–75.2)
NRBC # BLD: 0 /100 WBCS — SIGNIFICANT CHANGE UP (ref 0–0)
PCO2 BLDA: 44 MMHG — SIGNIFICANT CHANGE UP (ref 32–45)
PH BLDA: 7.36 — SIGNIFICANT CHANGE UP (ref 7.35–7.45)
PHOSPHATE SERPL-MCNC: 5 MG/DL — HIGH (ref 2.1–4.9)
PLATELET # BLD AUTO: 167 K/UL — SIGNIFICANT CHANGE UP (ref 130–400)
PMV BLD: 9.9 FL — SIGNIFICANT CHANGE UP (ref 7.4–10.4)
PO2 BLDA: 89 MMHG — SIGNIFICANT CHANGE UP (ref 83–108)
POTASSIUM SERPL-MCNC: 5.2 MMOL/L — HIGH (ref 3.5–5)
POTASSIUM SERPL-MCNC: 5.5 MMOL/L — HIGH (ref 3.5–5)
POTASSIUM SERPL-SCNC: 5.2 MMOL/L — HIGH (ref 3.5–5)
POTASSIUM SERPL-SCNC: 5.5 MMOL/L — HIGH (ref 3.5–5)
PROT SERPL-MCNC: 4.3 G/DL — LOW (ref 6–8)
RBC # BLD: 2.43 M/UL — LOW (ref 4.2–5.4)
RBC # FLD: 18.3 % — HIGH (ref 11.5–14.5)
SAO2 % BLDA: 96.9 % — SIGNIFICANT CHANGE UP (ref 94–98)
SODIUM SERPL-SCNC: 140 MMOL/L — SIGNIFICANT CHANGE UP (ref 135–146)
SODIUM SERPL-SCNC: 142 MMOL/L — SIGNIFICANT CHANGE UP (ref 135–146)
TSH SERPL-MCNC: 4.36 UIU/ML — HIGH (ref 0.27–4.2)
WBC # BLD: 6.09 K/UL — SIGNIFICANT CHANGE UP (ref 4.8–10.8)
WBC # FLD AUTO: 6.09 K/UL — SIGNIFICANT CHANGE UP (ref 4.8–10.8)

## 2024-08-08 PROCEDURE — 70450 CT HEAD/BRAIN W/O DYE: CPT | Mod: 26

## 2024-08-08 PROCEDURE — 99233 SBSQ HOSP IP/OBS HIGH 50: CPT

## 2024-08-08 PROCEDURE — 99222 1ST HOSP IP/OBS MODERATE 55: CPT

## 2024-08-08 PROCEDURE — 93970 EXTREMITY STUDY: CPT | Mod: 26

## 2024-08-08 RX ORDER — ACETAMINOPHEN 325 MG/1
1000 TABLET ORAL ONCE
Refills: 0 | Status: COMPLETED | OUTPATIENT
Start: 2024-08-08 | End: 2024-08-08

## 2024-08-08 RX ORDER — CHLORHEXIDINE GLUCONATE 40 MG/ML
1 SOLUTION TOPICAL DAILY
Refills: 0 | Status: DISCONTINUED | OUTPATIENT
Start: 2024-08-08 | End: 2024-08-23

## 2024-08-08 RX ORDER — SODIUM ZIRCONIUM CYCLOSILICATE 5 G/5G
10 POWDER, FOR SUSPENSION ORAL DAILY
Refills: 0 | Status: DISCONTINUED | OUTPATIENT
Start: 2024-08-08 | End: 2024-08-15

## 2024-08-08 RX ORDER — ACETAMINOPHEN 325 MG/1
650 TABLET ORAL ONCE
Refills: 0 | Status: DISCONTINUED | OUTPATIENT
Start: 2024-08-08 | End: 2024-08-08

## 2024-08-08 RX ORDER — SILVER SULFADIAZINE 1 %
1 CREAM (GRAM) TOPICAL
Qty: 1 | Refills: 0
Start: 2024-08-08 | End: 2024-08-22

## 2024-08-08 RX ORDER — ACETAMINOPHEN 325 MG/1
500 TABLET ORAL ONCE
Refills: 0 | Status: DISCONTINUED | OUTPATIENT
Start: 2024-08-08 | End: 2024-08-08

## 2024-08-08 RX ADMIN — Medication 5000 UNIT(S): at 05:56

## 2024-08-08 RX ADMIN — Medication 5000 UNIT(S): at 17:34

## 2024-08-08 RX ADMIN — ACETAMINOPHEN 650 MILLIGRAM(S): 325 TABLET ORAL at 21:16

## 2024-08-08 RX ADMIN — SODIUM ZIRCONIUM CYCLOSILICATE 10 GRAM(S): 5 POWDER, FOR SUSPENSION ORAL at 11:14

## 2024-08-08 RX ADMIN — CHLORHEXIDINE GLUCONATE 1 APPLICATION(S): 40 SOLUTION TOPICAL at 11:15

## 2024-08-08 RX ADMIN — ACETAMINOPHEN 650 MILLIGRAM(S): 325 TABLET ORAL at 01:43

## 2024-08-08 RX ADMIN — Medication 40 MILLIGRAM(S): at 05:56

## 2024-08-08 RX ADMIN — Medication 2 TABLET(S): at 21:15

## 2024-08-08 RX ADMIN — ACETAMINOPHEN 400 MILLIGRAM(S): 325 TABLET ORAL at 12:58

## 2024-08-08 RX ADMIN — ACETAMINOPHEN 1000 MILLIGRAM(S): 325 TABLET ORAL at 14:04

## 2024-08-08 RX ADMIN — METOPROLOL TARTRATE 25 MILLIGRAM(S): 100 TABLET ORAL at 05:56

## 2024-08-08 NOTE — PROGRESS NOTE ADULT - SUBJECTIVE AND OBJECTIVE BOX
24H events:    Patient is a 78y old Female who presents with a chief complaint of pressure ulcer (03 Aug 2024 11:12)    Primary diagnosis of Pressure ulcer    Today is hospital day 10d. This morning patient was seen and examined at bedside, resting comfortably in bed.    No acute or major events overnight.    Code Status:    Family communication:  Contact date:  Name of person contacted:  Relationship to patient:  Communication details:  What matters most:    PAST MEDICAL & SURGICAL HISTORY  HTN (hypertension)    Heart murmur    Eczema    Anemia  iron infusions and PRBC transfusions    Aortic stenosis    Chronic kidney disease (CKD)    2019 novel coronavirus disease (COVID-19)  2020- REHAB admission    Gastric AVM    H/O appendicitis    S/P cholecystectomy    History of esophagogastroduodenoscopy (EGD)    H/O colonoscopy    H/O  section    History of appendectomy    Port-A-Cath in place  right CW      SOCIAL HISTORY:  Social History:      ALLERGIES:  aspirin (Rash; Other)  penicillins (Rash; Urticaria; Hives; Blisters)  latex (Unknown)  EKG leads (Hives)    MEDICATIONS:  STANDING MEDICATIONS  chlorhexidine 2% Cloths 1 Application(s) Topical daily  cyanocobalamin 1000 MICROGram(s) Oral daily  folic acid 1 milliGRAM(s) Oral daily  furosemide    Tablet 40 milliGRAM(s) Oral daily  heparin   Injectable 5000 Unit(s) SubCutaneous every 12 hours  metoprolol tartrate 25 milliGRAM(s) Oral daily  pantoprazole    Tablet 40 milliGRAM(s) Oral before breakfast  polyethylene glycol 3350 17 Gram(s) Oral daily  senna 2 Tablet(s) Oral at bedtime  silver sulfADIAZINE 1% Cream 1 Application(s) Topical daily  sodium zirconium cyclosilicate 10 Gram(s) Oral daily    PRN MEDICATIONS  acetaminophen     Tablet .. 650 milliGRAM(s) Oral every 6 hours PRN    VITALS:   T(F): 97.4  HR: 66  BP: 104/62  RR: 18  SpO2: 98%    PHYSICAL EXAM:    General: NAD, non-toxic appearing  HEENT: EOMi, no scleral icterus  CV: systolic murmur  Lungs: normal respiratory effort, CTAB  Abd: Soft, nontender, nondistended  Ext: b/l pitting edema worse on right  Psych: A+Ox3, appropriate affect  Neuro: grossly non-focal, moving all extremities spontaneously  Skin: ruptured pressure ulcer of buttocks           (  ) Indwelling Arora Catheter:   Date insterted:    Reason (  ) Critical illness     (  ) urinary retention    (  ) Accurate Ins/Outs Monitoring     (  ) CMO patient    (  ) Central Line:   Date inserted:  Location: (  ) Right IJ     (  ) Left IJ     (  ) Right Fem     (  ) Left Fem    (  ) SPC        (  ) pigtail       (  ) PEG tube       (  ) colostomy       (  ) jejunostomy  (  ) U-Dall    LABS:                        7.2    6.09  )-----------( 167      ( 08 Aug 2024 06:41 )             24.0     08-08    140  |  107  |  103<HH>  ----------------------------<  104<H>  5.5<H>   |  24  |  2.8<H>    Ca    8.0<L>      08 Aug 2024 06:41  Phos  5.0     08-  Mg     2.6         TPro  4.3<L>  /  Alb  2.5<L>  /  TBili  0.5  /  DBili  x   /  AST  15  /  ALT  12  /  AlkPhos  68  08-08      Urinalysis Basic - ( 08 Aug 2024 06:41 )    Color: x / Appearance: x / SG: x / pH: x  Gluc: 104 mg/dL / Ketone: x  / Bili: x / Urobili: x   Blood: x / Protein: x / Nitrite: x   Leuk Esterase: x / RBC: x / WBC x   Sq Epi: x / Non Sq Epi: x / Bacteria: x                RADIOLOGY:           24H events:    Patient is a 78y old Female who presents with a chief complaint of pressure ulcer (03 Aug 2024 11:12)  Primary diagnosis of Pressure ulcer  Today is hospital day 10d. This morning patient was seen and examined at bedside, resting comfortably in bed.    No acute or major events overnight.    Code Status:    Family communication:  Contact date:  Name of person contacted:  Relationship to patient:  Communication details:  What matters most:    PAST MEDICAL & SURGICAL HISTORY  HTN (hypertension)    Heart murmur    Eczema    Anemia  iron infusions and PRBC transfusions    Aortic stenosis    Chronic kidney disease (CKD)    2019 novel coronavirus disease (COVID-19)  2020- REHAB admission    Gastric AVM    H/O appendicitis    S/P cholecystectomy    History of esophagogastroduodenoscopy (EGD)    H/O colonoscopy    H/O  section    History of appendectomy    Port-A-Cath in place  right CW      SOCIAL HISTORY:  Social History:      ALLERGIES:  aspirin (Rash; Other)  penicillins (Rash; Urticaria; Hives; Blisters)  latex (Unknown)  EKG leads (Hives)    MEDICATIONS:  STANDING MEDICATIONS  chlorhexidine 2% Cloths 1 Application(s) Topical daily  cyanocobalamin 1000 MICROGram(s) Oral daily  folic acid 1 milliGRAM(s) Oral daily  furosemide    Tablet 40 milliGRAM(s) Oral daily  heparin   Injectable 5000 Unit(s) SubCutaneous every 12 hours  metoprolol tartrate 25 milliGRAM(s) Oral daily  pantoprazole    Tablet 40 milliGRAM(s) Oral before breakfast  polyethylene glycol 3350 17 Gram(s) Oral daily  senna 2 Tablet(s) Oral at bedtime  silver sulfADIAZINE 1% Cream 1 Application(s) Topical daily  sodium zirconium cyclosilicate 10 Gram(s) Oral daily    PRN MEDICATIONS  acetaminophen     Tablet .. 650 milliGRAM(s) Oral every 6 hours PRN    VITALS:   T(F): 97.4  HR: 66  BP: 104/62  RR: 18  SpO2: 98%    PHYSICAL EXAM:    General: NAD, non-toxic appearing  HEENT: EOMi, no scleral icterus  CV: systolic murmur  Lungs: normal respiratory effort, CTAB  Abd: Soft, nontender, nondistended  Ext: b/l pitting edema worse on right  Psych: A+Ox3, appropriate affect  Neuro: grossly non-focal, moving all extremities spontaneously  Skin: ruptured pressure ulcer of buttocks         (  ) Indwelling Arora Catheter:   Date insterted:    Reason (  ) Critical illness     (  ) urinary retention    (  ) Accurate Ins/Outs Monitoring     (  ) CMO patient    (  ) Central Line:   Date inserted:  Location: (  ) Right IJ     (  ) Left IJ     (  ) Right Fem     (  ) Left Fem    (  ) SPC        (  ) pigtail       (  ) PEG tube       (  ) colostomy       (  ) jejunostomy  (  ) U-Dall    LABS:                        7.2    6.09  )-----------( 167      ( 08 Aug 2024 06:41 )             24.0     08-08    140  |  107  |  103<HH>  ----------------------------<  104<H>  5.5<H>   |  24  |  2.8<H>    Ca    8.0<L>      08 Aug 2024 06:41  Phos  5.0     08-  Mg     2.6         TPro  4.3<L>  /  Alb  2.5<L>  /  TBili  0.5  /  DBili  x   /  AST  15  /  ALT  12  /  AlkPhos  68  08-08      Urinalysis Basic - ( 08 Aug 2024 06:41 )    Color: x / Appearance: x / SG: x / pH: x  Gluc: 104 mg/dL / Ketone: x  / Bili: x / Urobili: x   Blood: x / Protein: x / Nitrite: x   Leuk Esterase: x / RBC: x / WBC x   Sq Epi: x / Non Sq Epi: x / Bacteria: x                RADIOLOGY:           24H events:    Patient is a 78y old Female who presents with a chief complaint of pressure ulcer (03 Aug 2024 11:12)  Primary diagnosis of Pressure ulcer  Today is hospital day 10d. This morning patient was seen and examined at bedside, resting comfortably in bed...    No acute or major events overnight.    Code Status:    Family communication:  Contact date:  Name of person contacted:  Relationship to patient:  Communication details:  What matters most:    PAST MEDICAL & SURGICAL HISTORY  HTN (hypertension)    Heart murmur    Eczema    Anemia  iron infusions and PRBC transfusions    Aortic stenosis    Chronic kidney disease (CKD)     novel coronavirus disease (COVID-19)  2020- REHAB admission    Gastric AVM    H/O appendicitis    S/P cholecystectomy    History of esophagogastroduodenoscopy (EGD)    H/O colonoscopy    H/O  section    History of appendectomy    Port-A-Cath in place  right CW      SOCIAL HISTORY:  Social History:      ALLERGIES:  aspirin (Rash; Other)  penicillins (Rash; Urticaria; Hives; Blisters)  latex (Unknown)  EKG leads (Hives)    MEDICATIONS:  STANDING MEDICATIONS  chlorhexidine 2% Cloths 1 Application(s) Topical daily  cyanocobalamin 1000 MICROGram(s) Oral daily  folic acid 1 milliGRAM(s) Oral daily  furosemide    Tablet 40 milliGRAM(s) Oral daily  heparin   Injectable 5000 Unit(s) SubCutaneous every 12 hours  metoprolol tartrate 25 milliGRAM(s) Oral daily  pantoprazole    Tablet 40 milliGRAM(s) Oral before breakfast  polyethylene glycol 3350 17 Gram(s) Oral daily  senna 2 Tablet(s) Oral at bedtime  silver sulfADIAZINE 1% Cream 1 Application(s) Topical daily  sodium zirconium cyclosilicate 10 Gram(s) Oral daily    PRN MEDICATIONS  acetaminophen     Tablet .. 650 milliGRAM(s) Oral every 6 hours PRN    VITALS:   T(F): 97.4  HR: 66  BP: 104/62  RR: 18  SpO2: 98%    PHYSICAL EXAM:    General: NAD, non-toxic appearing  HEENT: EOMi, no scleral icterus  CV: systolic murmur  Lungs: normal respiratory effort, CTAB  Abd: Soft, nontender, nondistended  Ext: b/l pitting edema worse on right  Psych: A+Ox3, appropriate affect  Neuro: grossly non-focal, moving all extremities spontaneously  Skin: ruptured pressure ulcer of buttocks         (  ) Indwelling Arora Catheter:   Date insterted:    Reason (  ) Critical illness     (  ) urinary retention    (  ) Accurate Ins/Outs Monitoring     (  ) CMO patient    (  ) Central Line:   Date inserted:  Location: (  ) Right IJ     (  ) Left IJ     (  ) Right Fem     (  ) Left Fem    (  ) SPC        (  ) pigtail       (  ) PEG tube       (  ) colostomy       (  ) jejunostomy  (  ) U-Dall    LABS:                        7.2    6.09  )-----------( 167      ( 08 Aug 2024 06:41 )             24.0     08-08    140  |  107  |  103<HH>  ----------------------------<  104<H>  5.5<H>   |  24  |  2.8<H>    Ca    8.0<L>      08 Aug 2024 06:41  Phos  5.0     08-  Mg     2.6         TPro  4.3<L>  /  Alb  2.5<L>  /  TBili  0.5  /  DBili  x   /  AST  15  /  ALT  12  /  AlkPhos  68  08-08      Urinalysis Basic - ( 08 Aug 2024 06:41 )    Color: x / Appearance: x / SG: x / pH: x  Gluc: 104 mg/dL / Ketone: x  / Bili: x / Urobili: x   Blood: x / Protein: x / Nitrite: x   Leuk Esterase: x / RBC: x / WBC x   Sq Epi: x / Non Sq Epi: x / Bacteria: x                RADIOLOGY:

## 2024-08-08 NOTE — CONSULT NOTE ADULT - SUBJECTIVE AND OBJECTIVE BOX
Neurology consult    LATRICIADERRICK [id number]    HPI:  HPI : Patient is a 78-year-old female with past medical history of CKD, HTN, CCY, gout, severe AS being planned for tAVR, and history of severe anemia secondary to chronic upper GI bleeding due to  AV malformation gastric body dieulafoy lesion, weekly blood transfusions presenting to ER for a rupture pressure ulcer of the buttock area today.   Patient states that last night was on her toilet which cracked while she was sitting on it, and patient was unable to get off the toilet for 4 hours.  By the time EMS arrived skin had ulcerated, with a blister that ruptured predominantly to the left buttock area (appears to be like burn blister that ruptured) with linear abrasions to the bilateral upper thighs/buttocks.  Patient in so much pain that she cannot stand or walk, Of note, pt was recently admitted to the hospital between 7/13-7/23 for hematochezia, s/p 1 unit prbc. Neurology consulted for increased drowsiness and weakness starting on 8/5. Patient is very drowsy on exam, falling asleep mid-conversation frequently throughout the interview. She is able to recall what brought her in to the hospital and what she had for breakfast. She is unable to recall when her drowsiness/weakness started. Patient is not able to hold her attention for further questioning. She denies any fever, chills. N/V, headache, or abdominal pain.    Vital Signs Last 24 Hrs  T(C): 36.4 (30 Jul 2024 00:51), Max: 37.1 (29 Jul 2024 18:30)  T(F): 97.6 (30 Jul 2024 00:51), Max: 98.7 (29 Jul 2024 18:30)  HR: 65 (30 Jul 2024 00:51) (59 - 65)  BP: 146/69 (30 Jul 2024 00:51) (112/46 - 146/69)  BP(mean): --  RR: 18 (30 Jul 2024 00:51) (18 - 18)  SpO2: 99% (30 Jul 2024 00:51) (95% - 99%)    Parameters below as of 30 Jul 2024 00:51  Patient On (Oxygen Delivery Method): room air    Labs significant for wbc 11k , hgb 9.1 , Cr 3.5 (baseline 2-3)   s/p  1L NS in ED   Admitted for wound management and placement as pt can not care for her own ADLs. (30 Jul 2024 03:46)    MEDICATIONS    acetaminophen     Tablet .. 650 milliGRAM(s) Oral every 6 hours PRN  chlorhexidine 2% Cloths 1 Application(s) Topical daily  cyanocobalamin 1000 MICROGram(s) Oral daily  folic acid 1 milliGRAM(s) Oral daily  heparin   Injectable 5000 Unit(s) SubCutaneous every 12 hours  metoprolol tartrate 25 milliGRAM(s) Oral daily  pantoprazole    Tablet 40 milliGRAM(s) Oral before breakfast  polyethylene glycol 3350 17 Gram(s) Oral daily  senna 2 Tablet(s) Oral at bedtime  silver sulfADIAZINE 1% Cream 1 Application(s) Topical daily  sodium zirconium cyclosilicate 10 Gram(s) Oral daily    Family history: No history of dementia, strokes, or seizures   FAMILY HISTORY:  FH: kidney disease (Father)    FH: breast cancer (Mother)    FH: multiple myeloma (Mother)    SOCIAL HISTORY -- No history of tobacco or alcohol use     Allergies    aspirin (Rash; Other)  penicillins (Rash; Urticaria; Hives; Blisters)  latex (Unknown)  EKG leads (Hives)    Intolerances    Weight (kg): 101 (08-08 @ 12:35)    Vital Signs Last 24 Hrs  T(C): 36.3 (08 Aug 2024 07:30), Max: 36.5 (07 Aug 2024 15:49)  T(F): 97.4 (08 Aug 2024 07:30), Max: 97.7 (07 Aug 2024 15:49)  HR: 66 (08 Aug 2024 07:30) (66 - 81)  BP: 104/62 (08 Aug 2024 07:30) (102/60 - 114/60)  BP(mean): --  RR: 18 (08 Aug 2024 06:45) (18 - 18)  SpO2: 98% (07 Aug 2024 15:49) (98% - 98%)    REVIEW OF SYSTEMS:    Constitutional: No fever, chills. + weakness and fatigue  Eyes: no eye pain, visual disturbances, or discharge  ENT:  No difficulty hearing, tinnitus, vertigo; No sinus or throat pain  Neck: No pain or stiffness  Respiratory: No cough, dyspnea, wheezing   Cardiovascular: No chest pain, palpitations,   Gastrointestinal: No abdominal or epigastric pain. No nausea, vomiting. + Diarrhea   Genitourinary: No dysuria, frequency, hematuria or incontinence  Neurological: No headaches, lightheadedness, vertigo. + paresthesias in extremeties  Psychiatric: No depression, anxiety, mood swings or difficulty sleeping  Musculoskeletal: + pain in legs  Skin: No itching, burning, rashes or lesions   Lymph Nodes: No enlarged glands  Endocrine: No heat or cold intolerance; No hair loss, No h/o diabetes or thyroid dysfunction  Allergy and Immunologic: No hives or eczema    Neurological Exam:  Mental Status: Patient is somnolent, oriented to person and place, unable to provide date, month, or year. Patient is confused throughout exam, able to follow simple commands with encouragement. Short and long-term memory intact.  Speech: Fluent, intact naming, repetition, and comprehension. Attention and concentration impaired.  Cranial Nerves:     - II: Pupils 3mm, equal and reactive, no RAPD, normal visual field and fundus     - III, IV, VI: EOM intact, no gaze preference or deviation     - V: normal     - VII: no facial asymmetry     - VIII: normal hearing to speech P  Motor Exam: 4/5 in bilateral UE, 3/5 in bilateral LE.   Muscle tone: Normal all over. No asymmetry is seen.    Sensory: Bilateral intact to light touch, vibration, and temperature  Gait: Unable to assess due to leg pain  Coordination: Finger-to-nose testing shows mild dysmetria, + asterixis    LABS:  CBC Full  -  ( 08 Aug 2024 06:41 )  WBC Count : 6.09 K/uL  RBC Count : 2.43 M/uL  Hemoglobin : 7.2 g/dL  Hematocrit : 24.0 %  Platelet Count - Automated : 167 K/uL  Mean Cell Volume : 98.8 fL  Mean Cell Hemoglobin : 29.6 pg  Mean Cell Hemoglobin Concentration : 30.0 g/dL  Auto Neutrophil # : 4.36 K/uL  Auto Lymphocyte # : 0.65 K/uL  Auto Monocyte # : 0.45 K/uL  Auto Eosinophil # : 0.58 K/uL  Auto Basophil # : 0.01 K/uL  Auto Neutrophil % : 71.5 %  Auto Lymphocyte % : 10.7 %  Auto Monocyte % : 7.4 %  Auto Eosinophil % : 9.5 %  Auto Basophil % : 0.2 %    Urinalysis Basic - ( 08 Aug 2024 06:41 )    Color: x / Appearance: x / SG: x / pH: x  Gluc: 104 mg/dL / Ketone: x  / Bili: x / Urobili: x   Blood: x / Protein: x / Nitrite: x   Leuk Esterase: x / RBC: x / WBC x   Sq Epi: x / Non Sq Epi: x / Bacteria: x    08-08    140  |  107  |  103<HH>  ----------------------------<  104<H>  5.5<H>   |  24  |  2.8<H>    Ca    8.0<L>      08 Aug 2024 06:41  Phos  5.0     08-08  Mg     2.6     08-08    TPro  4.3<L>  /  Alb  2.5<L>  /  TBili  0.5  /  DBili  x   /  AST  15  /  ALT  12  /  AlkPhos  68  08-08    LIVER FUNCTIONS - ( 08 Aug 2024 06:41 )  Alb: 2.5 g/dL / Pro: 4.3 g/dL / ALK PHOS: 68 U/L / ALT: 12 U/L / AST: 15 U/L / GGT: x

## 2024-08-08 NOTE — PROGRESS NOTE ADULT - SUBJECTIVE AND OBJECTIVE BOX
seen and examined  24 h events noted   no distress         PAST HISTORY  --------------------------------------------------------------------------------  No significant changes to PMH, PSH, FHx, SHx, unless otherwise noted    ALLERGIES & MEDICATIONS  --------------------------------------------------------------------------------  Allergies    aspirin (Rash; Other)  penicillins (Rash; Urticaria; Hives; Blisters)  latex (Unknown)  EKG leads (Hives)    Intolerances      Standing Inpatient Medications  cyanocobalamin 1000 MICROGram(s) Oral daily  folic acid 1 milliGRAM(s) Oral daily  furosemide    Tablet 40 milliGRAM(s) Oral daily  gabapentin 100 milliGRAM(s) Oral daily  heparin   Injectable 5000 Unit(s) SubCutaneous every 12 hours  metoprolol tartrate 25 milliGRAM(s) Oral daily  pantoprazole    Tablet 40 milliGRAM(s) Oral before breakfast  polyethylene glycol 3350 17 Gram(s) Oral daily  senna 2 Tablet(s) Oral at bedtime  silver sulfADIAZINE 1% Cream 1 Application(s) Topical daily    PRN Inpatient Medications  acetaminophen     Tablet .. 650 milliGRAM(s) Oral every 6 hours PRN        VITALS/PHYSICAL EXAM  --------------------------------------------------------------------------------  T(C): 36.5 (08-07-24 @ 15:49), Max: 36.5 (08-07-24 @ 15:49)  HR: 81 (08-08-24 @ 06:45) (60 - 81)  BP: 114/60 (08-08-24 @ 06:45) (102/60 - 114/60)  RR: 18 (08-08-24 @ 06:45) (18 - 18)  SpO2: 98% (08-07-24 @ 15:49) (98% - 98%)  Wt(kg): --        08-07-24 @ 07:01  -  08-08-24 @ 07:00  --------------------------------------------------------  IN: 250 mL / OUT: 600 mL / NET: -350 mL      Physical Exam:  	Gen: NAD  	Pulm: CTA B/L  	CV:  S1S2; no rub  	Abd: +distended  	    LABS/STUDIES  --------------------------------------------------------------------------------              7.1    5.87  >-----------<  146      [08-07-24 @ 13:47]              23.5     140  |  107  |  99  ----------------------------<  108      [08-07-24 @ 13:47]  5.0   |  23  |  2.6        Ca     7.9     [08-07-24 @ 13:47]      Mg     2.5     [08-07-24 @ 13:47]      Phos  5.1     [08-07-24 @ 13:47]    TPro  4.2  /  Alb  2.5  /  TBili  0.3  /  DBili  x   /  AST  14  /  ALT  13  /  AlkPhos  68  [08-07-24 @ 13:47]    Creatinine Trend:  SCr 2.6 [08-07 @ 13:47]  SCr 2.5 [08-06 @ 10:35]  SCr 3.0 [08-03 @ 10:19]  SCr 3.1 [08-02 @ 11:04]  SCr 3.2 [08-02 @ 10:20]    Urinalysis - [08-07-24 @ 13:47]      Color  / Appearance  / SG  / pH       Gluc 108 / Ketone   / Bili  / Urobili        Blood  / Protein  / Leuk Est  / Nitrite       RBC  / WBC  / Hyaline  / Gran  / Sq Epi  / Non Sq Epi  / Bacteria       Iron 23, TIBC 163, %sat 14      [08-01-24 @ 14:29]  Ferritin 170      [08-01-24 @ 14:29]

## 2024-08-08 NOTE — PROGRESS NOTE ADULT - ASSESSMENT
78F PMH CKD, HTN, CCY, gout, severe AS being planned for tAVR, and history of severe anemia secondary to chronic upper GI bleeding due to AV malformation gastric body dieulafoy lesion, weekly blood transfusions who presented for a ruptured pressure ulcer of the buttock area. Rupture occurred while on the toilet and patient was unable to stand for 4 hours. By the time EMS arrived skin had ulcerated, with a blister that ruptured predominantly to the left buttock area (appears to be like burn blister that ruptured) with linear abrasions to the bilateral upper thighs/buttocks.    #Ruptured Bilateral buttocks and sacrum deep Tissue Injury  - per wound recs: Cleanse wound to bilateral buttocks and sacrum with Vashe wound cleanser. Pat dry, apply Silvadene, cover with Xeroform and abdominal pad twice a day and prn for soiling  - Hg stable  - f/u nutrition consult    # Acute on Chronic normocytic DARRIN   # History of AVMs (gastric and duodenal) and Dieulafoy Lesions Status Post Hemostasis in 2023  # History of Perisplenic Varices in 2023   - on weekly IV transfusion/iron infusions per hematology  -  7/22: EGD and Colonoscopy: Erythema in the stomach compatible with non-erosive gastritis. 2 non-bleeding 7mm AVMs were seen at the second portion of the duodenum cauterization was successfully applied to ensurehemostasis. Polyps (5 mm) in the ascending colon. (Polypectomy). Polyps (10 mm) in the ascending colon. (Polypectomy). Polyp (10 mm) in the ascending colon. (Polypectomy, Endoclip). 3 non-bleeding AVMs ranging in size between 7-10mm were seen in the cecum.Using APC probe, cauterization was successfully performed to ensure hemostasis. 3 non-bleeding AVMs ranging in size between 7-105 mm were seen in theascending colon. Using APC probe, cauterization was successfully performed to ensure hemostasis. Severe diverticulosis of the the left side of the colon. Grade/Stage I external hemorrhoids.  - s/p 1U pRBC on 8/2. Hg stable   - completed course of venofer total of 1 g today 8/7 ; will need WOODY , hold for now   - c/w protonix    #Drowsy  - Patient is more drowsy since past days ( 8/5- 8/8 )   - Patient is taking gabapentin. stopped   8/8  - ABG-- no abnormalities noted, paco2 normal  - fu CT head   - fu neurology consult       # EVAN on stage 4 CKD  - Pt had elevated CK on 7/30/24 to 2766 with concern for rhabdo--> CK has since improved to 46 8/6/24  - Elevated BUN/Cr: continue to trend  - 7/30: KUB: No definite hydronephrosis  - S/p IV fluids  - Lasix resumed 8/5. holded  again 8/8  - Monitor I/O  - Low K diet, monitor BMP  - Nephro following    #Diastolic heart failure   #Severe aortic stenosis being planned for TAVR (oupt f/u: Dr. Hernandez)  -  8/1: TTE: EF- 56%. Mild asymmetric left ventricular hypertrophy. Grade II diastolic dysfunction. Severe aortic valve stenosis. Mild to moderate mitral valve regurgitation. Moderate aortic regurgitation.  - c/w metoprolol    #Peripheral neuropathy  - c/w home gabapentin 100. dc gabapentin due to drowsiness   - B12: 231, folate: 2 - replete, f/u outpt workup for deficiencies  - Evaluated by PT 7/31: STR    #Thrombocytopenia  - monitor platelets    #Pitting edema worse on rt  - f/u duplex b/l LE    DVT PPX: heparin   GI PPX: pantoprazole   DIET: DASH  CODE STATUS: full   DISPOSITION: STR    Pending: f/u BUN/Cr, electrolytes,  replete as needed, f/u b/l LE duplex , fu CT head, fu neurology

## 2024-08-08 NOTE — PROGRESS NOTE ADULT - ASSESSMENT
78-year-old female with past medical history of   CKD, HTN, CCY, gout,  severe AS being planned for tAVR, and history of severe anemia secondary to chronic upper GI bleeding due to  AV malformation gastric body dieulafoy lesion, weekly blood transfusions presenting to ER for a rupture pressure ulcer of the buttock area.   Patient states that last night was on her toilet which cracked while she was sitting on it, and patient was unable to get off the toilet for 4 hours.  By the time EMS arrived skin had ulcerated, with a blister that ruptured predominantly to the left buttock area (appears to be like burn blister that ruptured) with linear abrasions to the bilateral upper thighs/buttocks.  Patient in so much pain that she cannot stand or walk,    # Bilateral buttocks and sacrum deep Tissue Injury  wound team input appreciated  ID inpout appreciated , hold antibiotics -  wounds without any gross signs of infection    - Cleanse wound to bilateral buttocks and sacrum with Vashe wound cleanser. Pat dry, apply Silvadene, cover with Xeroform and abdominal pad twice a day and prn for soiling.   frequent turning, off loading,and positioning and skin care as per protocol, Maintain pressure injury prevention, Keep skin clean, Offload heels, Monitor wound for changes and notify provider if any   nutrition support     # Acute kidney inury on CKD stage 4, prerenal  # Rhabdomylosis- resolved  # Hyperkalemia  - Creatine Kinase normalized   - US Kidney and Bladder (07.30.24 @ 07:49) >No definite hydronephrosis  - S/p IV fluids  nephro on board - recc appreciated   - c/w  lasix   - monitor UO  - monitor BMP  - low k diet  - lokelma 10 q 24 if repeated k > 5.5 as per nephro - given  today   - monitor k  Monitor phosphoros   Creatinine Trend: 2.8<--, 2.6<--, 2.5<--, 3.0<--, 3.1<--, 3.2<--     Pt noted sleepy from 8/6 as per nursing staff   ABG/ TSH / CT HEAD AND Neurology consult placed   I discussed with nephrology?  uremic encephalopathy - as per nephro less likely , hold lasix for today and monitor   Neurochecs   no fever no leukocytosis     # Peripheral neuropathy  -  neurontin- held for sleepiness   - F/u  B12, folate   - evaluated by PT : STR    # Acute on chronic  anemia  # Non erosive gastritis  # Severe diverticulosis  # Grade/Stage I external hemorrhoids  # H/o  AVMs  # Colon polyps  # History of perisplenic Varices in 2023   -  EGD-Colonoscopy (07.22.24 @ 12:30) >Erythema in the stomach compatible with non-erosive gastritis. 2 non-bleeding 7mm AVMs were seen at the second portion of the duodenum cauterization was successfully applied to ensurehemostasis. Polyps (5 mm) in the ascending colon. (Polypectomy). Polyps (10 mm) in the ascending colon. (Polypectomy). Polyp (10 mm) in the ascending colon. (Polypectomy, Endoclip). 3 non-bleeding AVMs ranging in size between 7-10mm were seen in the cecum.Using APC probe, cauterization was successfully performed to ensure hemostasis. 3 non-bleeding AVMs ranging in size between 7-105 mm were seen in theascending colon. Using APC probe, cauterization was successfully performed to ensure hemostasis. Severe diverticulosis of the the left side of the colon. Grade/Stage I external hemorrhoids.  - c/w protonix  - s/p 1U pRBC on 8/2   completed course  IV Venofer - f/u with nephro for  WOODY - would start in am   start b12 and folate replacement for vit b12 and folate deficiency  op hematology and GI    keep type and screen active     # Chronic diastolic CHF  # Severe aortic stenosis   -  TTE Echo Complete w/o Contrast w/ Doppler (08.01.24 @ 11:03) >EF of 56 %. Mild asymmetric left ventricular hypertrophy. Grade II diastolic dysfunction). Severe aortic valve stenosis , Mild to moderate mitral valve regurgitation. Moderate aortic regurgitation.  - Patient to follow up with Dr. Hernandez as an outpatient for TAVR  - c/w metoprolol, lasix    # Thrombocytopenia- resolved    # DVT prophylaxis- HEPARIN SQ- get LE duplex     GOC : DNR/ DNI with NIV as per today GOC , i discussed with the patent son in details multiple tmes at bedside and he decided the DNR/DNO status   palliative team consult   # Pending: monitor mental status, neurology ,  BMP, CBC , , auth for STR  # Disposition: STR- PT eval    78-year-old female with past medical history of   CKD, HTN, CCY, gout,  severe AS being planned for tAVR, and history of severe anemia secondary to chronic upper GI bleeding due to  AV malformation gastric body dieulafoy lesion, weekly blood transfusions presenting to ER for a rupture pressure ulcer of the buttock area.   Patient states that last night was on her toilet which cracked while she was sitting on it, and patient was unable to get off the toilet for 4 hours.  By the time EMS arrived skin had ulcerated, with a blister that ruptured predominantly to the left buttock area (appears to be like burn blister that ruptured) with linear abrasions to the bilateral upper thighs/buttocks.  Patient in so much pain that she cannot stand or walk,    # Bilateral buttocks and sacrum deep Tissue Injury  wound team input appreciated (Bilateral buttocks and sacrum region (mimicking toilet seat) )   ID inpout appreciated , hold antibiotics -  wounds without any gross signs of infection    - Cleanse wound to bilateral buttocks and sacrum with Vashe wound cleanser. Pat dry, apply Silvadene, cover with Xeroform and abdominal pad twice a day and prn for soiling.   frequent turning, off loading,and positioning and skin care as per protocol, Maintain pressure injury prevention, Keep skin clean, Offload heels, Monitor wound for changes and notify provider if any   nutrition support   IV tylenol given   consult burn team given persisting pain     # Acute kidney inury on CKD stage 4, prerenal  # Rhabdomylosis- resolved  # Hyperkalemia  - Creatine Kinase normalized   - US Kidney and Bladder (07.30.24 @ 07:49) >No definite hydronephrosis  - S/p IV fluids  nephro on board - recc appreciated   - c/w  lasix   - monitor UO  - monitor BMP  - low k diet  - lokelma 10 q 24 if repeated k > 5.5 as per nephro - given  today   - monitor k  Monitor phosphoros   Creatinine Trend: 2.8<--, 2.6<--, 2.5<--, 3.0<--, 3.1<--, 3.2<--     Pt noted sleepy from 8/6 as per nursing staff   ABG/ TSH / CT HEAD AND Neurology consult placed   I discussed with nephrology?  uremic encephalopathy - as per nephro less likely , hold lasix for today and monitor   Neurochecs   no fever no leukocytosis     # Peripheral neuropathy  -  neurontin- held for sleepiness   - F/u  B12, folate   - evaluated by PT : STR    # Acute on chronic  anemia  # Non erosive gastritis  # Severe diverticulosis  # Grade/Stage I external hemorrhoids  # H/o  AVMs  # Colon polyps  # History of perisplenic Varices in 2023   -  EGD-Colonoscopy (07.22.24 @ 12:30) >Erythema in the stomach compatible with non-erosive gastritis. 2 non-bleeding 7mm AVMs were seen at the second portion of the duodenum cauterization was successfully applied to ensurehemostasis. Polyps (5 mm) in the ascending colon. (Polypectomy). Polyps (10 mm) in the ascending colon. (Polypectomy). Polyp (10 mm) in the ascending colon. (Polypectomy, Endoclip). 3 non-bleeding AVMs ranging in size between 7-10mm were seen in the cecum.Using APC probe, cauterization was successfully performed to ensure hemostasis. 3 non-bleeding AVMs ranging in size between 7-105 mm were seen in theascending colon. Using APC probe, cauterization was successfully performed to ensure hemostasis. Severe diverticulosis of the the left side of the colon. Grade/Stage I external hemorrhoids.  - c/w protonix  - s/p 1U pRBC on 8/2   completed course  IV Venofer - f/u with nephro for  WOODY - would start in am   start b12 and folate replacement for vit b12 and folate deficiency  op hematology and GI    keep type and screen active     # Chronic diastolic CHF  # Severe aortic stenosis   -  TTE Echo Complete w/o Contrast w/ Doppler (08.01.24 @ 11:03) >EF of 56 %. Mild asymmetric left ventricular hypertrophy. Grade II diastolic dysfunction). Severe aortic valve stenosis , Mild to moderate mitral valve regurgitation. Moderate aortic regurgitation.  - Patient to follow up with Dr. Hernandez as an outpatient for TAVR  - c/w metoprolol, lasix    # Thrombocytopenia- resolved    # DVT prophylaxis- HEPARIN SQ- get LE duplex     GOC : DNR/ DNI with NIV as per today GOC , i discussed with the patent son in details multiple tmes at bedside and he decided the DNR/DNO status   palliative team consult   # Pending: monitor mental status, neurology ,  BMP, CBC , , auth for STR  # Disposition: STR- PT eval

## 2024-08-08 NOTE — GOALS OF CARE CONVERSATION - ADVANCED CARE PLANNING - CONVERSATION DETAILS
I spoke with patient and son BELLA who was identified as the health care surrogate / health care proxy and we had an extensive conversation about patient 's care and current condition. Discussed full vs limited medical management. Extensively explained that full medical management would involve intubating the patient if medically indicated and performing chest compressions in an attempt to resuscitate if the patient were to arrest. Further explained the concept of limited medical management, such as DNR/DNI. Further explained that DNR/DNI would not preclude medical management up until the point of arrest/intubation (such as antibiotics, IV fluids, blood draws, etc.). Patient/family expressed understanding of above. Decision made, patient is now DNR/DNI with NIV trail

## 2024-08-08 NOTE — PROGRESS NOTE ADULT - SUBJECTIVE AND OBJECTIVE BOX
T H I S   I S    N O  T   A    F I N A L I Z E D   N O T E      LATRICIA ARREAGA  78y, Female  Allergy: aspirin (Rash; Other)  penicillins (Rash; Urticaria; Hives; Blisters)  latex (Unknown)  EKG leads (Hives)    Hospital Day: 10d    Patient seen and examined earlier today.     PMH/PSH:  PAST MEDICAL & SURGICAL HISTORY:  HTN (hypertension)      Heart murmur      Eczema      Anemia  iron infusions and PRBC transfusions      Aortic stenosis      Chronic kidney disease (CKD)      2019 novel coronavirus disease (COVID-19)  2020- REHAB admission      Gastric AVM      H/O appendicitis      S/P cholecystectomy      History of esophagogastroduodenoscopy (EGD)      H/O colonoscopy      H/O  section      History of appendectomy      Port-A-Cath in place  right CW          LAST 24-Hr EVENTS:    VITALS:  T(F): 97.4 (24 @ 07:30), Max: 97.7 (24 @ 15:49)  HR: 66 (24 @ 07:30)  BP: 104/62 (24 @ 07:30) (102/60 - 114/60)  RR: 18 (24 @ 06:45)  SpO2: 98% (24 @ 15:49)          TESTS & MEASUREMENTS:  Weight/BMI      24 @ 07:01  -  24 @ 07:00  --------------------------------------------------------  IN: 354 mL / OUT: 800 mL / NET: -446 mL    24 @ 07:01  -  24 @ 07:00  --------------------------------------------------------  IN: 250 mL / OUT: 1300 mL / NET: -1050 mL                            7.2    6.09  )-----------( 167      ( 08 Aug 2024 06:41 )             24.0         08-08    140  |  107  |  103<HH>  ----------------------------<  104<H>  5.5<H>   |  24  |  2.8<H>    Ca    8.0<L>      08 Aug 2024 06:41  Phos  5.0     08-  Mg     2.6     -    TPro  4.3<L>  /  Alb  2.5<L>  /  TBili  0.5  /  DBili  x   /  AST  15  /  ALT  12  /  AlkPhos  68  08-08    LIVER FUNCTIONS - ( 08 Aug 2024 06:41 )  Alb: 2.5 g/dL / Pro: 4.3 g/dL / ALK PHOS: 68 U/L / ALT: 12 U/L / AST: 15 U/L / GGT: x                 Urinalysis Basic - ( 08 Aug 2024 06:41 )    Color: x / Appearance: x / SG: x / pH: x  Gluc: 104 mg/dL / Ketone: x  / Bili: x / Urobili: x   Blood: x / Protein: x / Nitrite: x   Leuk Esterase: x / RBC: x / WBC x   Sq Epi: x / Non Sq Epi: x / Bacteria: x          Ferritin: 170 ng/mL (24 @ 14:29)                    RADIOLOGY, ECG, & ADDITIONAL TESTS:  12 Lead ECG:   Ventricular Rate 78 BPM    Atrial Rate 78 BPM    P-R Interval 186 ms    QRS Duration 94 ms    Q-T Interval 438 ms    QTC Calculation(Bazett) 499 ms    P Axis 70 degrees    R Axis -42 degrees    T Axis -35 degrees    Diagnosis Line Sinus rhythm withfrequent Premature ventricular complexes  Left axis deviation  Incomplete right bundle branch block  Minimal voltage criteria for LVH, may be normal variant  ST & T wave abnormality, consider inferior ischemia  Abnormal ECG    Confirmed by El Zhao ([id number]) on 2024 6:30:32 PM (24 @ 16:05)      RECENT DIAGNOSTIC ORDERS:  Thyroid Stimulating Hormone, Serum: STAT (24 @ 09:16)  VA Duplex Lower Ext Vein Scan, Bilat: 11:44 (24 @ 11:47)      MEDICATIONS:  MEDICATIONS  (STANDING):  chlorhexidine 2% Cloths 1 Application(s) Topical daily  cyanocobalamin 1000 MICROGram(s) Oral daily  folic acid 1 milliGRAM(s) Oral daily  furosemide    Tablet 40 milliGRAM(s) Oral daily  heparin   Injectable 5000 Unit(s) SubCutaneous every 12 hours  metoprolol tartrate 25 milliGRAM(s) Oral daily  pantoprazole    Tablet 40 milliGRAM(s) Oral before breakfast  polyethylene glycol 3350 17 Gram(s) Oral daily  senna 2 Tablet(s) Oral at bedtime  silver sulfADIAZINE 1% Cream 1 Application(s) Topical daily  sodium zirconium cyclosilicate 10 Gram(s) Oral daily    MEDICATIONS  (PRN):  acetaminophen     Tablet .. 650 milliGRAM(s) Oral every 6 hours PRN Mild Pain (1 - 3)      HOME MEDICATIONS:  furosemide 40 mg oral tablet ()  metoprolol tartrate 25 mg oral tablet ()  pantoprazole 40 mg oral delayed release tablet ()  silver sulfADIAZINE 1% topical cream ()      PHYSICAL EXAM:  GENERAL:   CHEST/LUNG:   HEART:   ABDOMEN:   EXTREMITIES:               LATRICIA ARREAGA  78y, Female  Allergy: aspirin (Rash; Other)  penicillins (Rash; Urticaria; Hives; Blisters)  latex (Unknown)  EKG leads (Hives)    Hospital Day: 10d    Patient seen and examined earlier today.   she goes sleepy  but arousal easy , answer questions properly, son at bedside. , he stated that some times she dozes off , but this is more worse   She c/o of pain at the back wound area     PMH/PSH:  PAST MEDICAL & SURGICAL HISTORY:  HTN (hypertension)      Heart murmur      Eczema      Anemia  iron infusions and PRBC transfusions      Aortic stenosis      Chronic kidney disease (CKD)      2019 novel coronavirus disease (COVID-19)  2020- REHAB admission      Gastric AVM      H/O appendicitis      S/P cholecystectomy      History of esophagogastroduodenoscopy (EGD)      H/O colonoscopy      H/O  section      History of appendectomy      Port-A-Cath in place  right CW          LAST 24-Hr EVENTS:    VITALS:  T(F): 97.4 (24 @ 07:30), Max: 97.7 (24 @ 15:49)  HR: 66 (24 @ 07:30)  BP: 104/62 (24 @ 07:30) (102/60 - 114/60)  RR: 18 (24 @ 06:45)  SpO2: 98% (24 @ 15:49)          TESTS & MEASUREMENTS:  Weight/BMI      24 @ 07:01  -  24 @ 07:00  --------------------------------------------------------  IN: 354 mL / OUT: 800 mL / NET: -446 mL    24 @ 07:01  -  24 @ 07:00  --------------------------------------------------------  IN: 250 mL / OUT: 1300 mL / NET: -1050 mL                            7.2    6.09  )-----------( 167      ( 08 Aug 2024 06:41 )             24.0         08-08    140  |  107  |  103<HH>  ----------------------------<  104<H>  5.5<H>   |  24  |  2.8<H>    Ca    8.0<L>      08 Aug 2024 06:41  Phos  5.0     -  Mg     2.6         TPro  4.3<L>  /  Alb  2.5<L>  /  TBili  0.5  /  DBili  x   /  AST  15  /  ALT  12  /  AlkPhos  68      LIVER FUNCTIONS - ( 08 Aug 2024 06:41 )  Alb: 2.5 g/dL / Pro: 4.3 g/dL / ALK PHOS: 68 U/L / ALT: 12 U/L / AST: 15 U/L / GGT: x                 Urinalysis Basic - ( 08 Aug 2024 06:41 )    Color: x / Appearance: x / SG: x / pH: x  Gluc: 104 mg/dL / Ketone: x  / Bili: x / Urobili: x   Blood: x / Protein: x / Nitrite: x   Leuk Esterase: x / RBC: x / WBC x   Sq Epi: x / Non Sq Epi: x / Bacteria: x          Ferritin: 170 ng/mL (24 @ 14:29)                    RADIOLOGY, ECG, & ADDITIONAL TESTS:  12 Lead ECG:   Ventricular Rate 78 BPM    Atrial Rate 78 BPM    P-R Interval 186 ms    QRS Duration 94 ms    Q-T Interval 438 ms    QTC Calculation(Bazett) 499 ms    P Axis 70 degrees    R Axis -42 degrees    T Axis -35 degrees    Diagnosis Line Sinus rhythm withfrequent Premature ventricular complexes  Left axis deviation  Incomplete right bundle branch block  Minimal voltage criteria for LVH, may be normal variant  ST & T wave abnormality, consider inferior ischemia  Abnormal ECG    Confirmed by El Zhao ([id number]) on 2024 6:30:32 PM (24 @ 16:05)      RECENT DIAGNOSTIC ORDERS:  Thyroid Stimulating Hormone, Serum: STAT (24 @ 09:16)  VA Duplex Lower Ext Vein Scan, Bilat: 11:44 (24 @ 11:47)      MEDICATIONS:  MEDICATIONS  (STANDING):  chlorhexidine 2% Cloths 1 Application(s) Topical daily  cyanocobalamin 1000 MICROGram(s) Oral daily  folic acid 1 milliGRAM(s) Oral daily  furosemide    Tablet 40 milliGRAM(s) Oral daily  heparin   Injectable 5000 Unit(s) SubCutaneous every 12 hours  metoprolol tartrate 25 milliGRAM(s) Oral daily  pantoprazole    Tablet 40 milliGRAM(s) Oral before breakfast  polyethylene glycol 3350 17 Gram(s) Oral daily  senna 2 Tablet(s) Oral at bedtime  silver sulfADIAZINE 1% Cream 1 Application(s) Topical daily  sodium zirconium cyclosilicate 10 Gram(s) Oral daily    MEDICATIONS  (PRN):  acetaminophen     Tablet .. 650 milliGRAM(s) Oral every 6 hours PRN Mild Pain (1 - 3)      HOME MEDICATIONS:  furosemide 40 mg oral tablet ()  metoprolol tartrate 25 mg oral tablet ()  pantoprazole 40 mg oral delayed release tablet ()  silver sulfADIAZINE 1% topical cream ()      PHYSICAL EXAM:  On exam  General: sleepy  but arousal easy , answer questions properly and follows simple commands like moving her UE and LE , chronic ill appearance  Lungs:  clear to ausculation b/l, normal resp effort, Port-A-Cath in place, no tender or erythema or discharge   Heart: regular ryhthm   Abdomen: soft, non tender non distended + BS   Ext: + edema, can move all  his extremities   neuro : grossly non focal , some ? very mild twitches intermittent , rare arounf the mouth and UE

## 2024-08-08 NOTE — CONSULT NOTE ADULT - SUBJECTIVE AND OBJECTIVE BOX
HPI:  HPI : Patient is a 78-year-old female with past medical history of   CKD, HTN, CCY, gout,  severe AS being planned for tAVR, and history of severe anemia secondary to chronic upper GI bleeding due to  AV malformation gastric body dieulafoy lesion, weekly blood transfusions presenting to ER for a rupture pressure ulcer of the buttock area today.   Patient states that last night was on her toilet which cracked while she was sitting on it, and patient was unable to get off the toilet for 4 hours.  By the time EMS arrived skin had ulcerated, with a blister that ruptured predominantly to the left buttock area (appears to be like burn blister that ruptured) with linear abrasions to the bilateral upper thighs/buttocks.  Patient in so much pain that she cannot stand or walk, Of note, pt was recently admitted to the hospital between 7/13-7/23 for hematochezia, s/p 1 unit prbc. Denies fever, chills. N/V, abd pain.     Vital Signs Last 24 Hrs  T(C): 36.4 (30 Jul 2024 00:51), Max: 37.1 (29 Jul 2024 18:30)  T(F): 97.6 (30 Jul 2024 00:51), Max: 98.7 (29 Jul 2024 18:30)  HR: 65 (30 Jul 2024 00:51) (59 - 65)  BP: 146/69 (30 Jul 2024 00:51) (112/46 - 146/69)  BP(mean): --  RR: 18 (30 Jul 2024 00:51) (18 - 18)  SpO2: 99% (30 Jul 2024 00:51) (95% - 99%)    Parameters below as of 30 Jul 2024 00:51  Patient On (Oxygen Delivery Method): room air    Labs significant for wbc 11k , hgb 9.1 , Cr 3.5 (baseline 2-3)   s/p  1L NS in ED   Admitted for wound management and placement as pt can not care for her own ADLs. (30 Jul 2024 03:46)    Patient speaking in full sentences but has a difficult time explaining why she is here in the hospital and what her medical problems are. She is agreeable for me to discuss with her son.       ITEMS NOT CHECKED ARE NOT PRESENT    SOCIAL HISTORY:   Significant other/partner[ ]  Children[x ]  Episcopal/Spirituality:  Substance hx:  [ ]   Tobacco hx:  [ ]   Alcohol hx: [ ]   Living Situation: [ ]Home  [ ]Long term care  [ ]Rehab [ ]Other  Home Services: [ ] HHA [ ] Dana RN [ ] Hospice  Occupation:  Home Opioid hx:  [ ] Y [ ] N [ ] I-Stop Reference No:     ADVANCE DIRECTIVES:    [ ] Full Code [ ] DNR  MOLST  [ ]  Living Will  [ ]   DECISION MAKER(s):  [ ] Health Care Proxy(s)  [ ] Surrogate(s)  [ ] Guardian           Name(s): Phone Number(s):    BASELINE (I)ADL(s) (prior to admission):  [place]: [ ]Total  [ ] Moderate [ ]Dependent  Palliative Performance Status Version 2:         %    http://npcrc.org/files/news/palliative_performance_scale_ppsv2.pdf    Allergies    aspirin (Rash; Other)  penicillins (Rash; Urticaria; Hives; Blisters)  latex (Unknown)  EKG leads (Hives)    Intolerances    MEDICATIONS  (STANDING):  chlorhexidine 2% Cloths 1 Application(s) Topical daily  cyanocobalamin 1000 MICROGram(s) Oral daily  folic acid 1 milliGRAM(s) Oral daily  heparin   Injectable 5000 Unit(s) SubCutaneous every 12 hours  metoprolol tartrate 25 milliGRAM(s) Oral daily  pantoprazole    Tablet 40 milliGRAM(s) Oral before breakfast  polyethylene glycol 3350 17 Gram(s) Oral daily  senna 2 Tablet(s) Oral at bedtime  silver sulfADIAZINE 1% Cream 1 Application(s) Topical daily  sodium zirconium cyclosilicate 10 Gram(s) Oral daily    MEDICATIONS  (PRN):  acetaminophen     Tablet .. 650 milliGRAM(s) Oral every 6 hours PRN Mild Pain (1 - 3)      PRESENT SYMPTOMS: [ ]Unable to obtain due to poor mentation   Source if other than patient:  [ ]Family   [ ]Team     Pain: [x ]yes [ ]no  QOL impact -   Location - buttocks                   Aggravating factors - none  Alleviating factors - none  Quality - burning  Radiation - no  Timing - a few days  Severity (0-10 scale): 5  Minimal acceptable level (0-10 scale):     CPOT:    https://www.[email or web address]/getattachment/[email or web address]-[email or web address]/Critical-Care-Pain-Observation-Tool-(CPOT)    PAIN AD Score:   http://geriatrictoolkit.[facility]/cog/painad.pdf     Dyspnea:                           [x ]None[ ]Mild [ ]Moderate [ ]Severe     Respiratory Distress Observation Scale (RDOS):   A score of 0 to 2 signifies little or no respiratory distress, 3 signifies mild distress, scores 4 to 6 indicate moderate distress, and scores greater than 7 signify severe distress  https://www.[facility].ca/sites/default/files/PDFS/367050-lxaaddabxso-gkhwnaba-dmaorbsazdl-ttbxz.pdf    Anxiety:                             [ ]None[ ]Mild [ ]Moderate [ ]Severe   Fatigue:                             [ ]None[ ]Mild [ ]Moderate [ ]Severe   Nausea:                             [ ]None[ ]Mild [ ]Moderate [ ]Severe   Loss of appetite:              [ ]None[ ]Mild [ ]Moderate [ ]Severe   Constipation:                    [ ]None[ ]Mild [ ]Moderate [ ]Severe    Other Symptoms:  [x ]All other review of systems negative     Palliative Performance Status Version 2:         %    http://[facility].org/files/news/palliative_performance_scale_[email or web address].pdf  PHYSICAL EXAM:    GENERAL:  NAD   PULMONARY:  Non labored breathing  NEUROLOGIC: Grossly intact  BEHAVIORAL/PSYCH:  Calm.     CRITICAL CARE:  [ ] Shock Present  [ ]Septic [ ]Cardiogenic [ ]Neurologic [ ]Hypovolemic  [ ]  Vasopressors [ ]  Inotropes   [ ]Respiratory failure present [ ]Mechanical ventilation [ ]Non-invasive ventilatory support [ ]High flow  [ ]Acute  [ ]Chronic [ ]Hypoxic  [ ]Hypercarbic [ ]Other  [ ]Other organ failure     LABS: I have reviewed daily labs    RADIOLOGY & ADDITIONAL STUDIES: I have reviewed new imaging    PROTEIN CALORIE MALNUTRITION PRESENT: [ ]mild [ ]moderate [ ]severe [ ]underweight [ ]morbid obesity  https://www.andeal.org/vault/2440/web/files/ONC/Table_Clinical%20Characteristics%20to%20Document%20Malnutrition-White%20JV%20et%20al%202012.pdf    Height (cm): 170.2 (07-29-24 @ 18:30), 170.2 (07-22-24 @ 17:10), 167.6 (12-07-23 @ 17:05)  Weight (kg): 101 (08-08-24 @ 12:35), 101.8 (07-22-24 @ 17:10), 95.3 (12-07-23 @ 17:05)  BMI (kg/m2): 34.9 (08-08-24 @ 12:35), 35.1 (07-29-24 @ 18:30), 35.1 (07-22-24 @ 17:10)    [ ]PPSV2 < or = to 30% [ ]significant weight loss  [ ]poor nutritional intake  [ ]anasarca      [ ]Artificial Nutrition      Palliative Care Spiritual/Emotional Screening Tool Question  Severity (0-4):                    OR                    [ x] Unable to determine. Will assess at later time if appropriate.  Score of 2 or greater indicates recommendation of Chaplaincy and/or SW referral  Chaplaincy Referral: [ ] Yes [ ] Refused [ ] Following     Caregiver [place]:  [ ] Yes [ ] No    OR    [x ] Unable to determine. Will assess at later time if appropriate.  Social Work Referral [ ]  Patient and Family Centered Care Referral [ ]    Anticipatory Grief Present: [ ] Yes [ ] No    OR     [ x] Unable to determine. Will assess at later time if appropriate.  Social Work Referral [ ]  Patient and Family Centered Care Referral [ ]    REFERRALS:   [ ]Chaplaincy  [ ]Hospice  [ ]Child Life  [ ]Social Work  [ ]Case management [ ]Holistic Therapy     Palliative care education provided to patient and/or family

## 2024-08-08 NOTE — CONSULT NOTE ADULT - ASSESSMENT
HPI : Patient is a 78-year-old female with past medical history of   CKD, HTN, CCY, gout,  severe AS being planned for tAVR, and history of severe anemia secondary to chronic upper GI bleeding due to  AV malformation gastric body dieulafoy lesion, weekly blood transfusions presenting to ER for a rupture pressure ulcer of the buttock area    Patient speaking in full sentences but has a difficult time explaining why she is here in the hospital and what her medical problems are. She also does not have a great sense as to how long she has been admitted for. She is agreeable for me to discuss with her son. Before I was able to call Domenico, the primary team was able to get ahold of him to discuss code status, and he decided on DNR/DNI.     Plan:  - agree with tylenol  - continue current medical management  - currently DNR/DNI    - discussed with primary team, no other palliative discussions needed at this time.     Palliative care will sign off.   Please call [phone] with questions or concerns 24/7.   _____________  Adam Brito MD  Palliative Medicine  [facility]   [facility] Pall[facility]  [phone]

## 2024-08-08 NOTE — CONSULT NOTE ADULT - ASSESSMENT
Called and spoke with pt's spouse with the help of PI id#[id number]. I informed her that her  was due for a colonoscopy, due to a poor prep in Nov/22. Pt was scheduled for 3/6. Instructions were reviewed verbally. I informed her he needed to be on clear liquids for two days before his procedure and that he would need to take Dulcolax the day before and the day of his colon. I also informed her the prep would be in English. She stated she has someone who would be able to read this and help him. Instructions were mailed in a self addressed envelope.    Patient is a 78-year-old female with past medical history of CKD, HTN, CCY, gout, severe AS, and anemia admitted to the hospital for a rupture pressure ulcer on the buttock. Neurology consulted for increased drowsiness and weakness starting 8/5. Patient is somnolent on examination. Physical exam is significant for b/l extremity weakness, mild dysmetria, and asterixis. Mental status and exam findings concerning for metabolic encephalopathy 2/2 worsening kidney function.    Plan:  ***Note Incomplete***    Case discussed with Neurology attending Dr. Pereira. Patient is a 78-year-old female with past medical history of CKD, HTN, CCY, gout, severe AS, and anemia admitted to the hospital for a rupture pressure ulcer on the buttock. Neurology consulted for increased drowsiness and weakness starting 8/5. Patient is somnolent on examination. Physical exam is significant for b/l extremity weakness, mild dysmetria, and asterixis. Mental status and exam findings concerning for metabolic encephalopathy 2/2 worsening kidney function.    Impression: Altered mental status in patient with multifactorial toxic-metabolic encephalopathy due to EVAN over CKD, severe anemia and increasing uremia (?GI bleeding)     Plan:  - Management by primary team  - Agree with current management  - Evaluate in fluid intake needed and bladder scans to rule out urinary retention  - We recommend nephrology and GI on board if needed  - If neurological worsening or any further question, please contact back neurology    Case discussed with Neurology attending Dr. Pereira. Patient is a 78-year-old female with past medical history of CKD, HTN, CCY, gout, severe AS, and anemia admitted to the hospital for a rupture pressure ulcer on the buttock. Neurology consulted for increased drowsiness and weakness starting 8/5. Patient is somnolent on examination. Physical exam is significant for b/l extremity weakness, mild dysmetria, and asterixis. Mental status and exam findings concerning for metabolic encephalopathy 2/2 worsening kidney function.    Impression: Altered mental status in patient with multifactorial toxic-metabolic encephalopathy due to EVAN over CKD, severe anemia and increasing uremia (?GI bleeding)     Plan:  - Management by primary team  - Evaluate in fluid intake needed and bladder scans to rule out urinary retention  - We recommend nephrology and GI on board if needed  - check NH3 level, TSH  - correct electrolytes, address anemia  - If neurological worsening or any further question, please contact back neurology    Case discussed with Neurology attending Dr. Pereira.

## 2024-08-08 NOTE — PROGRESS NOTE ADULT - ASSESSMENT
78-year-old female with past medical history of   CKD, HTN, CCY, gout,  severe AS being planned for TAVR, and history of severe anemia secondary to chronic upper GI bleeding due to  AV malformation gastric body dieulafoy lesion, weekly blood transfusions presenting to ER for a rupture pressure ulcer of the buttock area.  #EVAN on CKD4  - creat stable   - no hydro on renal/bladder sono   -  non oliguric   - bladder scan q shift   - continue lasix 40  -  last Phos 5.1 at goal  - start lokelma 10 q 24 if repeated k > 5.5 / low k diet   - completed course of venofer  no indication for RRT   will follow

## 2024-08-09 ENCOUNTER — APPOINTMENT (OUTPATIENT)
Age: 79
End: 2024-08-09

## 2024-08-09 LAB
ALBUMIN SERPL ELPH-MCNC: 2.4 G/DL — LOW (ref 3.5–5.2)
ALP SERPL-CCNC: 73 U/L — SIGNIFICANT CHANGE UP (ref 30–115)
ALT FLD-CCNC: 12 U/L — SIGNIFICANT CHANGE UP (ref 0–41)
AMMONIA BLD-MCNC: 206 UMOL/L — CRITICAL HIGH (ref 11–55)
ANION GAP SERPL CALC-SCNC: 11 MMOL/L — SIGNIFICANT CHANGE UP (ref 7–14)
ANION GAP SERPL CALC-SCNC: 12 MMOL/L — SIGNIFICANT CHANGE UP (ref 7–14)
AST SERPL-CCNC: 19 U/L — SIGNIFICANT CHANGE UP (ref 0–41)
BASOPHILS # BLD AUTO: 0.01 K/UL — SIGNIFICANT CHANGE UP (ref 0–0.2)
BASOPHILS NFR BLD AUTO: 0.2 % — SIGNIFICANT CHANGE UP (ref 0–1)
BILIRUB SERPL-MCNC: 0.4 MG/DL — SIGNIFICANT CHANGE UP (ref 0.2–1.2)
BUN SERPL-MCNC: 105 MG/DL — CRITICAL HIGH (ref 10–20)
BUN SERPL-MCNC: 107 MG/DL — CRITICAL HIGH (ref 10–20)
CALCIUM SERPL-MCNC: 8.3 MG/DL — LOW (ref 8.4–10.5)
CALCIUM SERPL-MCNC: 8.4 MG/DL — SIGNIFICANT CHANGE UP (ref 8.4–10.4)
CHLORIDE SERPL-SCNC: 107 MMOL/L — SIGNIFICANT CHANGE UP (ref 98–110)
CHLORIDE SERPL-SCNC: 108 MMOL/L — SIGNIFICANT CHANGE UP (ref 98–110)
CO2 SERPL-SCNC: 23 MMOL/L — SIGNIFICANT CHANGE UP (ref 17–32)
CO2 SERPL-SCNC: 23 MMOL/L — SIGNIFICANT CHANGE UP (ref 17–32)
CREAT SERPL-MCNC: 2.6 MG/DL — HIGH (ref 0.7–1.5)
CREAT SERPL-MCNC: 2.7 MG/DL — HIGH (ref 0.7–1.5)
EGFR: 17 ML/MIN/1.73M2 — LOW
EGFR: 18 ML/MIN/1.73M2 — LOW
EOSINOPHIL # BLD AUTO: 0.54 K/UL — SIGNIFICANT CHANGE UP (ref 0–0.7)
EOSINOPHIL NFR BLD AUTO: 8.5 % — HIGH (ref 0–8)
GLUCOSE SERPL-MCNC: 102 MG/DL — HIGH (ref 70–99)
GLUCOSE SERPL-MCNC: 144 MG/DL — HIGH (ref 70–99)
HCT VFR BLD CALC: 24.4 % — LOW (ref 37–47)
HGB BLD-MCNC: 7.3 G/DL — LOW (ref 12–16)
IMM GRANULOCYTES NFR BLD AUTO: 0.8 % — HIGH (ref 0.1–0.3)
LACTATE SERPL-SCNC: 1 MMOL/L — SIGNIFICANT CHANGE UP (ref 0.7–2)
LYMPHOCYTES # BLD AUTO: 0.62 K/UL — LOW (ref 1.2–3.4)
LYMPHOCYTES # BLD AUTO: 9.8 % — LOW (ref 20.5–51.1)
MAGNESIUM SERPL-MCNC: 2.7 MG/DL — HIGH (ref 1.8–2.4)
MCHC RBC-ENTMCNC: 29.6 PG — SIGNIFICANT CHANGE UP (ref 27–31)
MCHC RBC-ENTMCNC: 29.9 G/DL — LOW (ref 32–37)
MCV RBC AUTO: 98.8 FL — SIGNIFICANT CHANGE UP (ref 81–99)
MONOCYTES # BLD AUTO: 0.46 K/UL — SIGNIFICANT CHANGE UP (ref 0.1–0.6)
MONOCYTES NFR BLD AUTO: 7.3 % — SIGNIFICANT CHANGE UP (ref 1.7–9.3)
NEUTROPHILS # BLD AUTO: 4.65 K/UL — SIGNIFICANT CHANGE UP (ref 1.4–6.5)
NEUTROPHILS NFR BLD AUTO: 73.4 % — SIGNIFICANT CHANGE UP (ref 42.2–75.2)
NRBC # BLD: 0 /100 WBCS — SIGNIFICANT CHANGE UP (ref 0–0)
PHOSPHATE SERPL-MCNC: 5.3 MG/DL — HIGH (ref 2.1–4.9)
PLATELET # BLD AUTO: 178 K/UL — SIGNIFICANT CHANGE UP (ref 130–400)
PMV BLD: 9.8 FL — SIGNIFICANT CHANGE UP (ref 7.4–10.4)
POTASSIUM SERPL-MCNC: 5.1 MMOL/L — HIGH (ref 3.5–5)
POTASSIUM SERPL-MCNC: 5.3 MMOL/L — HIGH (ref 3.5–5)
POTASSIUM SERPL-SCNC: 5.1 MMOL/L — HIGH (ref 3.5–5)
POTASSIUM SERPL-SCNC: 5.3 MMOL/L — HIGH (ref 3.5–5)
PROT SERPL-MCNC: 4.9 G/DL — LOW (ref 6–8)
RBC # BLD: 2.47 M/UL — LOW (ref 4.2–5.4)
RBC # FLD: 18.6 % — HIGH (ref 11.5–14.5)
SODIUM SERPL-SCNC: 142 MMOL/L — SIGNIFICANT CHANGE UP (ref 135–146)
SODIUM SERPL-SCNC: 142 MMOL/L — SIGNIFICANT CHANGE UP (ref 135–146)
WBC # BLD: 6.33 K/UL — SIGNIFICANT CHANGE UP (ref 4.8–10.8)
WBC # FLD AUTO: 6.33 K/UL — SIGNIFICANT CHANGE UP (ref 4.8–10.8)

## 2024-08-09 PROCEDURE — 71045 X-RAY EXAM CHEST 1 VIEW: CPT | Mod: 26

## 2024-08-09 PROCEDURE — 99232 SBSQ HOSP IP/OBS MODERATE 35: CPT

## 2024-08-09 PROCEDURE — 73610 X-RAY EXAM OF ANKLE: CPT | Mod: 26,RT

## 2024-08-09 PROCEDURE — 99221 1ST HOSP IP/OBS SF/LOW 40: CPT

## 2024-08-09 PROCEDURE — 74176 CT ABD & PELVIS W/O CONTRAST: CPT | Mod: 26

## 2024-08-09 RX ORDER — CYANOCOBALAMIN (VITAMIN B-12) 500MCG/0.1
1000 GEL (ML) NASAL DAILY
Refills: 0 | Status: DISCONTINUED | OUTPATIENT
Start: 2024-08-09 | End: 2024-08-09

## 2024-08-09 RX ORDER — LIDOCAINE HCL/EPINEPHRINE 2%-1:50000
3 SYRINGE (ML) INJECTION ONCE
Refills: 0 | Status: COMPLETED | OUTPATIENT
Start: 2024-08-09 | End: 2024-08-09

## 2024-08-09 RX ORDER — GABAPENTIN 100 MG
100 CAPSULE ORAL DAILY
Refills: 0 | Status: DISCONTINUED | OUTPATIENT
Start: 2024-08-09 | End: 2024-08-11

## 2024-08-09 RX ORDER — LACTULOSE 10 G
20 PACKET (EA) ORAL EVERY 6 HOURS
Refills: 0 | Status: DISCONTINUED | OUTPATIENT
Start: 2024-08-09 | End: 2024-08-12

## 2024-08-09 RX ORDER — ACETAMINOPHEN 325 MG/1
1000 TABLET ORAL ONCE
Refills: 0 | Status: COMPLETED | OUTPATIENT
Start: 2024-08-09 | End: 2024-08-09

## 2024-08-09 RX ORDER — OXYCODONE AND ACETAMINOPHEN 7.5; 325 MG/1; MG/1
1 TABLET ORAL ONCE
Refills: 0 | Status: DISCONTINUED | OUTPATIENT
Start: 2024-08-09 | End: 2024-08-09

## 2024-08-09 RX ORDER — EPOETIN ALFA 3000 [IU]/ML
10000 SOLUTION INTRAVENOUS; SUBCUTANEOUS
Refills: 0 | Status: DISCONTINUED | OUTPATIENT
Start: 2024-08-09 | End: 2024-08-23

## 2024-08-09 RX ORDER — METOPROLOL TARTRATE 100 MG/1
12.5 TABLET ORAL EVERY 12 HOURS
Refills: 0 | Status: DISCONTINUED | OUTPATIENT
Start: 2024-08-09 | End: 2024-08-23

## 2024-08-09 RX ORDER — HYDROMORPHONE HYDROCHLORIDE 2 MG/1
0.2 TABLET ORAL ONCE
Refills: 0 | Status: DISCONTINUED | OUTPATIENT
Start: 2024-08-09 | End: 2024-08-09

## 2024-08-09 RX ORDER — CYANOCOBALAMIN (VITAMIN B-12) 500MCG/0.1
1000 GEL (ML) NASAL DAILY
Refills: 0 | Status: COMPLETED | OUTPATIENT
Start: 2024-08-10 | End: 2024-08-14

## 2024-08-09 RX ORDER — ACETAMINOPHEN 325 MG/1
1000 TABLET ORAL EVERY 8 HOURS
Refills: 0 | Status: DISCONTINUED | OUTPATIENT
Start: 2024-08-09 | End: 2024-08-23

## 2024-08-09 RX ORDER — LACTULOSE 10 G
30 PACKET (EA) ORAL
Refills: 0 | Status: DISCONTINUED | OUTPATIENT
Start: 2024-08-09 | End: 2024-08-09

## 2024-08-09 RX ADMIN — Medication 40 MILLIGRAM(S): at 05:27

## 2024-08-09 RX ADMIN — Medication 5000 UNIT(S): at 05:28

## 2024-08-09 RX ADMIN — POLYETHYLENE GLYCOL 3350 17 GRAM(S): 17 POWDER, FOR SOLUTION ORAL at 11:58

## 2024-08-09 RX ADMIN — Medication 2 TABLET(S): at 21:22

## 2024-08-09 RX ADMIN — Medication 1 MILLIGRAM(S): at 11:58

## 2024-08-09 RX ADMIN — EPOETIN ALFA 10000 UNIT(S): 3000 SOLUTION INTRAVENOUS; SUBCUTANEOUS at 17:11

## 2024-08-09 RX ADMIN — SODIUM ZIRCONIUM CYCLOSILICATE 10 GRAM(S): 5 POWDER, FOR SUSPENSION ORAL at 11:59

## 2024-08-09 RX ADMIN — METOPROLOL TARTRATE 12.5 MILLIGRAM(S): 100 TABLET ORAL at 17:24

## 2024-08-09 RX ADMIN — ACETAMINOPHEN 400 MILLIGRAM(S): 325 TABLET ORAL at 08:43

## 2024-08-09 RX ADMIN — CHLORHEXIDINE GLUCONATE 1 APPLICATION(S): 40 SOLUTION TOPICAL at 11:59

## 2024-08-09 RX ADMIN — Medication 5000 UNIT(S): at 16:58

## 2024-08-09 RX ADMIN — Medication 100 MILLIGRAM(S): at 08:43

## 2024-08-09 RX ADMIN — METOPROLOL TARTRATE 25 MILLIGRAM(S): 100 TABLET ORAL at 05:27

## 2024-08-09 RX ADMIN — Medication 1000 MICROGRAM(S): at 11:58

## 2024-08-09 RX ADMIN — ACETAMINOPHEN 1000 MILLIGRAM(S): 325 TABLET ORAL at 21:22

## 2024-08-09 RX ADMIN — Medication 250 MILLILITER(S): at 11:58

## 2024-08-09 RX ADMIN — Medication 20 GRAM(S): at 23:58

## 2024-08-09 RX ADMIN — Medication 20 GRAM(S): at 21:21

## 2024-08-09 NOTE — PROGRESS NOTE ADULT - ASSESSMENT
Patient is a 78-year-old female with past medical history of CKD, HTN, CCY, gout, severe AS, and anemia admitted to the hospital for a rupture pressure ulcer on the buttock. Neurology consulted for increased drowsiness and weakness starting 8/5. Patient is somnolent on examination. Physical exam is significant for b/l extremity weakness, mild dysmetria, and asterixis. Mental status and exam findings concerning for metabolic encephalopathy 2/2 worsening kidney function.    Impression: Altered mental status in patient with multifactorial toxic-metabolic encephalopathy due to EVAN over CKD, severe anemia and increasing uremia (?GI bleeding)     Plan:  - Recommend checking ammonia levels, consider lactulose if abnormal  - Recommend Right Ankle XR given tenderness on exam following fall  - Management by primary team  - Evaluate if fluid intake needed and bladder scans to rule out urinary retention  - We recommend nephrology and GI on board if needed  - If neurological worsening or any further question, please contact back neurology    Case discussed with Neurology attending Dr. Pereira.

## 2024-08-09 NOTE — PROGRESS NOTE ADULT - SUBJECTIVE AND OBJECTIVE BOX
24H events:    Patient is a 78y old Female who presents with a chief complaint of pressure ulcer (03 Aug 2024 11:12)    Primary diagnosis of Pressure ulcer    Today is hospital day 11d. This morning patient was seen and examined at bedside, resting comfortably in bed.    No acute or major events overnight. Patient is complaining of buttock pain, abdomen pain. Also more drowsy.      Code Status:    Family communication:  Contact date:  Name of person contacted:  Relationship to patient:  Communication details:  What matters most:    PAST MEDICAL & SURGICAL HISTORY  HTN (hypertension)    Heart murmur    Eczema    Anemia  iron infusions and PRBC transfusions    Aortic stenosis    Chronic kidney disease (CKD)    2019 novel coronavirus disease (COVID-19)  2020- REHAB admission    Gastric AVM    H/O appendicitis    S/P cholecystectomy    History of esophagogastroduodenoscopy (EGD)    H/O colonoscopy    H/O  section    History of appendectomy    Port-A-Cath in place  right CW      SOCIAL HISTORY:  Social History:      ALLERGIES:  aspirin (Rash; Other)  penicillins (Rash; Urticaria; Hives; Blisters)  latex (Unknown)  EKG leads (Hives)    MEDICATIONS:  STANDING MEDICATIONS  chlorhexidine 2% Cloths 1 Application(s) Topical daily  cyanocobalamin 1000 MICROGram(s) Oral daily  folic acid 1 milliGRAM(s) Oral daily  gabapentin 100 milliGRAM(s) Oral daily  heparin   Injectable 5000 Unit(s) SubCutaneous every 12 hours  metoprolol tartrate 12.5 milliGRAM(s) Oral every 12 hours  pantoprazole    Tablet 40 milliGRAM(s) Oral before breakfast  polyethylene glycol 3350 17 Gram(s) Oral daily  senna 2 Tablet(s) Oral at bedtime  silver sulfADIAZINE 1% Cream 1 Application(s) Topical daily  sodium zirconium cyclosilicate 10 Gram(s) Oral daily    PRN MEDICATIONS  acetaminophen     Tablet .. 650 milliGRAM(s) Oral every 6 hours PRN    VITALS:   T(F): 97  HR: 62  BP: 98/43  RR: 18  SpO2: 97%    PHYSICAL EXAM:      General: NAD, non-toxic appearing  HEENT: EOMi, no scleral icterus  CV: systolic murmur  Lungs: normal respiratory effort, CTAB  Abd: Soft, nontender, nondistended  Ext: b/l pitting edema worse on right  Psych: A+Ox3, appropriate affect  Neuro: grossly non-focal, moving all extremities spontaneously  Skin: ruptured pressure ulcer of buttocks       AMPAC score:    (  ) Indwelling Arora Catheter:   Date insterted:    Reason (  ) Critical illness     (  ) urinary retention    (  ) Accurate Ins/Outs Monitoring     (  ) CMO patient    (  ) Central Line:   Date inserted:  Location: (  ) Right IJ     (  ) Left IJ     (  ) Right Fem     (  ) Left Fem    (  ) SPC        (  ) pigtail       (  ) PEG tube       (  ) colostomy       (  ) jejunostomy  (  ) U-Dall    LABS:                        7.3    6.33  )-----------( 178      ( 09 Aug 2024 07:43 )             24.4     08-    142  |  108  |  107<HH>  ----------------------------<  102<H>  5.3<H>   |  23  |  2.6<H>    Ca    8.3<L>      09 Aug 2024 07:43  Phos  5.3     08-  Mg     2.7         TPro  4.9<L>  /  Alb  2.4<L>  /  TBili  0.4  /  DBili  x   /  AST  19  /  ALT  12  /  AlkPhos  73  08-09      Urinalysis Basic - ( 09 Aug 2024 07:43 )    Color: x / Appearance: x / SG: x / pH: x  Gluc: 102 mg/dL / Ketone: x  / Bili: x / Urobili: x   Blood: x / Protein: x / Nitrite: x   Leuk Esterase: x / RBC: x / WBC x   Sq Epi: x / Non Sq Epi: x / Bacteria: x      ABG - ( 08 Aug 2024 12:54 )  pH, Arterial: 7.36  pH, Blood: x     /  pCO2: 44    /  pO2: 89    / HCO3: 25    / Base Excess: -0.8  /  SaO2: 96.9                      RADIOLOGY:

## 2024-08-09 NOTE — CONSULT NOTE ADULT - ASSESSMENT
Assesment/Plan  Patient is a 78y old  Female who presents with a chief complaint of pressure ulcer (03 Aug 2024 11:12)    recommend bedside debridement of necrotic tissue, will d/w family  continue local wound care BID, wash with soap and water, apply hydragel, xeroform, abd and tape  position change/log roll  pain control  rest of care per primary team

## 2024-08-09 NOTE — PROGRESS NOTE ADULT - ASSESSMENT
78F PMH CKD, HTN, CCY, gout, severe AS being planned for tAVR, and history of severe anemia secondary to chronic upper GI bleeding due to AV malformation gastric body dieulafoy lesion, weekly blood transfusions who presented for a ruptured pressure ulcer of the buttock area. Rupture occurred while on the toilet and patient was unable to stand for 4 hours. By the time EMS arrived skin had ulcerated, with a blister that ruptured predominantly to the left buttock area (appears to be like burn blister that ruptured) with linear abrasions to the bilateral upper thighs/buttocks.    #Ruptured Bilateral buttocks and sacrum deep Tissue Injury  - per wound recs: Cleanse wound to bilateral buttocks and sacrum with Vashe wound cleanser. Pat dry, apply Silvadene, cover with Xeroform and abdominal pad twice a day and prn for soiling  - Hb stable  - f/u nutrition consult  - fu burn team for possible debridement. Burn team consulted    # Acute on Chronic normocytic DARRIN   # History of AVMs (gastric and duodenal) and Dieulafoy Lesions Status Post Hemostasis in 2023  # History of Perisplenic Varices in 2023   - on weekly IV transfusion/iron infusions per hematology  -  7/22: EGD and Colonoscopy: Erythema in the stomach compatible with non-erosive gastritis. 2 non-bleeding 7mm AVMs were seen at the second portion of the duodenum cauterization was successfully applied to ensurehemostasis. Polyps (5 mm) in the ascending colon. (Polypectomy). Polyps (10 mm) in the ascending colon. (Polypectomy). Polyp (10 mm) in the ascending colon. (Polypectomy, Endoclip). 3 non-bleeding AVMs ranging in size between 7-10mm were seen in the cecum.Using APC probe, cauterization was successfully performed to ensure hemostasis. 3 non-bleeding AVMs ranging in size between 7-105 mm were seen in theascending colon. Using APC probe, cauterization was successfully performed to ensure hemostasis. Severe diverticulosis of the the left side of the colon. Grade/Stage I external hemorrhoids.  - s/p 1U pRBC on 8/2. Hg stable   - completed course of venofer total of 1 g today 8/7 ; will need WOODY , hold for now   - c/w protonix    #Drowsy  - Patient is more drowsy since past days ( 8/5- 8/8 )   - Patient is taking gabapentin. stopped   8/8  - ABG-- no abnormalities noted, paco2 normal  - fu CT head   - fu neurology consult       # EVAN on stage 4 CKD  - Pt had elevated CK on 7/30/24 to 2766 with concern for rhabdo--> CK has since improved to 46 8/6/24  - Elevated BUN/Cr: continue to trend  - 7/30: KUB: No definite hydronephrosis  - S/p IV fluids  - Lasix resumed 8/5. holded  again 8/8  - Monitor I/O  - Low K diet, monitor BMP  - Nephro following    #Diastolic heart failure   #Severe aortic stenosis being planned for TAVR (oupt f/u: Dr. Hernandez)  -  8/1: TTE: EF- 56%. Mild asymmetric left ventricular hypertrophy. Grade II diastolic dysfunction. Severe aortic valve stenosis. Mild to moderate mitral valve regurgitation. Moderate aortic regurgitation.  - c/w metoprolol    #Peripheral neuropathy  - c/w home gabapentin 100. dc gabapentin due to drowsiness   - B12: 231, folate: 2 - replete, f/u outpt workup for deficiencies  - Evaluated by PT 7/31: STR    #Thrombocytopenia  - monitor platelets    #Pitting edema worse on rt  - f/u duplex b/l LE    DVT PPX: heparin   GI PPX: pantoprazole   DIET: DASH  CODE STATUS: full   DISPOSITION: STR    Pending: f/u BUN/Cr, electrolytes,  replete as needed, f/u b/l LE duplex , fu CT head, fu neurology      78F PMH CKD, HTN, CCY, gout, severe AS being planned for tAVR, and history of severe anemia secondary to chronic upper GI bleeding due to AV malformation gastric body dieulafoy lesion, weekly blood transfusions who presented for a ruptured pressure ulcer of the buttock area. Rupture occurred while on the toilet and patient was unable to stand for 4 hours. By the time EMS arrived skin had ulcerated, with a blister that ruptured predominantly to the left buttock area (appears to be like burn blister that ruptured) with linear abrasions to the bilateral upper thighs/buttocks.    #Ruptured Bilateral buttocks and sacrum deep Tissue Injury  - per wound recs: Cleanse wound to bilateral buttocks and sacrum with Vashe wound cleanser. Pat dry, apply Silvadene, cover with Xeroform and abdominal pad twice a day and prn for soiling  - Hb stable  - f/u nutrition consult  - fu burn team for possible debridement. Burn team consulted  - fu CT abdomen/pelvis to rule out any wound hematoma     # Acute on Chronic normocytic DARRIN   # History of AVMs (gastric and duodenal) and Dieulafoy Lesions Status Post Hemostasis in 2023  # History of Perisplenic Varices in 2023   - on weekly IV transfusion/iron infusions per hematology  - 7/22: EGD and Colonoscopy: Erythema in the stomach compatible with non-erosive gastritis. 2 non-bleeding 7mm AVMs were seen at the second portion of the duodenum cauterization was successfully applied to ensurehemostasis. Polyps (5 mm) in the ascending colon. (Polypectomy). Polyps (10 mm) in the ascending colon. (Polypectomy). Polyp (10 mm) in the ascending colon. (Polypectomy, Endoclip). 3 non-bleeding AVMs ranging in size between 7-10mm were seen in the cecum.Using APC probe, cauterization was successfully performed to ensure hemostasis. 3 non-bleeding AVMs ranging in size between 7-105 mm were seen in the ascending colon. Using APC probe, cauterization was successfully performed to ensure hemostasis. Severe diverticulosis of the the left side of the colon. Grade/Stage I external hemorrhoids.  - s/p 1U pRBC on 8/2. Hg stable...    - completed course of venofer total of 1 g today 8/7 ; will need WOODY , hold for now   - c/w protonix    #Drowsy likely due to worsening CKD- 4  # metabolic encephalopathy 2/2 worsening kidney function.  - Patient is more drowsy since past days ( 8/5- 8/8 )   - Patient is taking gabapentin. stopped   8/8. restarted 8/9 as patient is having severe pain.   - ABG-- no abnormalities noted, paco2 normal  - fu CT head -- no acute pathology   - fu neurology consult -- cw with current rx.       #EVAN on stage 4 CKD  - Pt had elevated CK on 7/30/24 to 2766 with concern for rhabdo--> CK has since improved to 46 8/6/24  - Elevated BUN/Cr: continue to trend  - 7/30: KUB: No definite hydronephrosis  - S/p IV fluids  - Lasix resumed 8/5. holded  again 8/8.   - give iv LR bolus 250 ml ( 8/9)   - Monitor I/O  - Low K diet, monitor BMP  - Nephro following    #Diastolic heart failure   #Severe aortic stenosis being planned for TAVR (oupt f/u: Dr. Hernandez)  -  8/1: TTE: EF- 56%. Mild asymmetric left ventricular hypertrophy. Grade II diastolic dysfunction. Severe aortic valve stenosis. Mild to moderate mitral valve regurgitation. Moderate aortic regurgitation.  - c/w metoprolol    #Peripheral neuropathy  - c/w home gabapentin 100. dc gabapentin due to drowsiness   - B12: 231, folate: 2 - replete, f/u outpt workup for deficiencies  - Evaluated by PT 7/31: STR    #Thrombocytopenia  - monitor platelets    #Pitting edema worse on rt  - f/u duplex b/l LE-- negative for dvt     DVT PPX: heparin   GI PPX: pantoprazole   DIET: DASH  CODE STATUS: DNR/DNI   DISPOSITION: STR    Pending: f/u BUN/Cr, electrolytes,  replete as needed, fu CT abdomen /pelvis , fu burn team, pain control      78F PMH CKD, HTN, CCY, gout, severe AS being planned for tAVR, and history of severe anemia secondary to chronic upper GI bleeding due to AV malformation gastric body dieulafoy lesion, weekly blood transfusions who presented for a ruptured pressure ulcer of the buttock area. Rupture occurred while on the toilet and patient was unable to stand for 4 hours. By the time EMS arrived skin had ulcerated, with a blister that ruptured predominantly to the left buttock area (appears to be like burn blister that ruptured) with linear abrasions to the bilateral upper thighs/buttocks.    #Ruptured Bilateral buttocks and sacrum deep Tissue Injury  - per wound recs: Cleanse wound to bilateral buttocks and sacrum with Vashe wound cleanser. Pat dry, apply Silvadene, cover with Xeroform and abdominal pad twice a day and prn for soiling  - Hb stable  - f/u nutrition consult  - fu burn team for possible debridement. Burn team consulted  - fu CT abdomen/pelvis to rule out any wound hematoma     # Acute on Chronic normocytic DARRIN   # History of AVMs (gastric and duodenal) and Dieulafoy Lesions Status Post Hemostasis in 2023  # History of Perisplenic Varices in 2023   - on weekly IV transfusion/iron infusions per hematology  - 7/22: EGD and Colonoscopy: Erythema in the stomach compatible with non-erosive gastritis. 2 non-bleeding 7mm AVMs were seen at the second portion of the duodenum cauterization was successfully applied to ensurehemostasis. Polyps (5 mm) in the ascending colon. (Polypectomy). Polyps (10 mm) in the ascending colon. (Polypectomy). Polyp (10 mm) in the ascending colon. (Polypectomy, Endoclip). 3 non-bleeding AVMs ranging in size between 7-10mm were seen in the cecum.Using APC probe, cauterization was successfully performed to ensure hemostasis. 3 non-bleeding AVMs ranging in size between 7-105 mm were seen in the ascending colon. Using APC probe, cauterization was successfully performed to ensure hemostasis. Severe diverticulosis of the the left side of the colon. Grade/Stage I external hemorrhoids.  - s/p 1U pRBC on 8/2. Hg stable...    - completed course of venofer total of 1 g today 8/7 ; will need WOODY , 10k units once a week  - c/w protonix    #Drowsy likely due to worsening CKD- 4  # metabolic encephalopathy 2/2 worsening kidney function.  - Patient is more drowsy since past days ( 8/5- 8/8 )   - Patient is taking gabapentin. stopped   8/8. restarted 8/9 as patient is having severe pain.   - ABG-- no abnormalities noted, paco2 normal  - fu CT head -- no acute pathology   - fu neurology consult -- cw with current rx.       #EVAN on stage 4 CKD  - Pt had elevated CK on 7/30/24 to 2766 with concern for rhabdo--> CK has since improved to 46 8/6/24  - Elevated BUN/Cr: continue to trend  - 7/30: KUB: No definite hydronephrosis  - S/p IV fluids  - Lasix resumed 8/5. holded  again 8/8.   - give iv LR bolus 250 ml ( 8/9)   - Monitor I/O  - Low K diet, monitor BMP  - Nephro following    #Diastolic heart failure   #Severe aortic stenosis being planned for TAVR (oupt f/u: Dr. Hernandez)  -  8/1: TTE: EF- 56%. Mild asymmetric left ventricular hypertrophy. Grade II diastolic dysfunction. Severe aortic valve stenosis. Mild to moderate mitral valve regurgitation. Moderate aortic regurgitation.  - c/w metoprolol    #Peripheral neuropathy  - c/w home gabapentin 100. dc gabapentin due to drowsiness   - B12: 231, folate: 2 - replete, f/u outpt workup for deficiencies  - Evaluated by PT 7/31: STR    #Thrombocytopenia  - monitor platelets    #Pitting edema worse on rt  - f/u duplex b/l LE-- negative for dvt     DVT PPX: heparin   GI PPX: pantoprazole   DIET: DASH  CODE STATUS: DNR/DNI   DISPOSITION: STR    Pending: f/u BUN/Cr, electrolytes,  replete as needed, fu CT abdomen /pelvis , fu burn team, pain control

## 2024-08-09 NOTE — CONSULT NOTE ADULT - SUBJECTIVE AND OBJECTIVE BOX
Patient is a 78y old  Female who presents with a chief complaint of pressure ulcer (03 Aug 2024 11:12)      HPI:  HPI : Patient is a 78-year-old female with past medical history of   CKD, HTN, CCY, gout,  severe AS being planned for tAVR, and history of severe anemia secondary to chronic upper GI bleeding due to  AV malformation gastric body dieulafoy lesion, weekly blood transfusions presenting to ER for a rupture pressure ulcer of the buttock area today.   Patient states that last night was on her toilet which cracked while she was sitting on it, and patient was unable to get off the toilet for 4 hours.  By the time EMS arrived skin had ulcerated, with a blister that ruptured predominantly to the left buttock area (appears to be like burn blister that ruptured) with linear abrasions to the bilateral upper thighs/buttocks.  Patient in so much pain that she cannot stand or walk, Of note, pt was recently admitted to the hospital between - for hematochezia, s/p 1 unit prbc. Denies fever, chills. N/V, abd pain.   Admitted for wound management and placement as pt can not care for her own ADLs. (2024 03:46)    Pt seen today with attending on AM rounds. We were called for evaluation of bilateral buttocks/thigh wounds.     PAST MEDICAL & SURGICAL HISTORY:  HTN (hypertension)      Heart murmur      Eczema      Anemia  iron infusions and PRBC transfusions      Aortic stenosis      Chronic kidney disease (CKD)      2019 novel coronavirus disease (COVID-19)  2020- REHAB admission      Gastric AVM      H/O appendicitis      S/P cholecystectomy      History of esophagogastroduodenoscopy (EGD)      H/O colonoscopy      H/O  section      History of appendectomy      Port-A-Cath in place  right CW      Allergies    aspirin (Rash; Other)  penicillins (Rash; Urticaria; Hives; Blisters)  latex (Unknown)  EKG leads (Hives)    Intolerances              7.3    6.33  )-----------( 178      ( 09 Aug 2024 07:43 )             24.4       PHYSICAL EXAM:  GENERAL:  well-developed  HEAD:  Atraumatic, Normocephalic  EYES: EOMI,    CHEST/LUNG: No cardiopulmonary compromise, breathing comfortably on room air  EXTREMITIES:  2+ Peripheral Pulses, No clubbing, cyanosis, or edema  Neuro: alert, awake,   SKIN: bilateral buttocks wounds left worse than right - extending into posterior upper thigh with areas of adherent black eschar, pale/yellow eschar, and some areas of pink moist dermis, no surrounding erythema or edema, no purulent drainage or bleeding  dressing changed, pt tolerated well.

## 2024-08-09 NOTE — CONSULT NOTE ADULT - ASSESSMENT
A/P: Patient is a 78F with PMH of CKD, HTN, CCY, gout, severe AS being planned for tAVR, and history of severe anemia secondary to chronic upper GI bleeding due to AV malformation gastric body dieulafoy lesion, weekly blood transfusions who presented for a ruptured pressure ulcer of the buttock area. Pain medicine services now consulted.     Patient seen and evaluated at bedside by me. Patient drowsy at the time of exam but endorsing 8/10 pain to wound and BL LE.     #Ruptured Bilateral buttocks and sacrum deep Tissue Injury  - Gabapentin 100mg Daily - monitor for lethargy/confusion  - As per Palliative note patient and family agreeable to tylenol for medical management at this time  - Ofirmev 1g q8h standing   - PT/OT/Rehab  - Wound Care PRN  - Bowel regimen as per primary team

## 2024-08-09 NOTE — CONSULT NOTE ADULT - SUBJECTIVE AND OBJECTIVE BOX
HPI: Patient is a 78F with PMH of CKD, HTN, CCY, gout, severe AS being planned for tAVR, and history of severe anemia secondary to chronic upper GI bleeding due to AV malformation gastric body dieulafoy lesion, weekly blood transfusions who presented for a ruptured pressure ulcer of the buttock area. Pain medicine services now consulted.     Patient seen and evaluated at bedside by me. Patient drowsy at the time of exam but endorsing 8/10 pain to wound and BL LE.     Current Inpatient Medication Regimen:  acetaminophen     Tablet .. 650 milliGRAM(s) Oral every 6 hours PRN  chlorhexidine 2% Cloths 1 Application(s) Topical daily  cyanocobalamin 1000 MICROGram(s) Oral daily  folic acid 1 milliGRAM(s) Oral daily  gabapentin 100 milliGRAM(s) Oral daily  heparin   Injectable 5000 Unit(s) SubCutaneous every 12 hours  metoprolol tartrate 12.5 milliGRAM(s) Oral every 12 hours  pantoprazole    Tablet 40 milliGRAM(s) Oral before breakfast  polyethylene glycol 3350 17 Gram(s) Oral daily  senna 2 Tablet(s) Oral at bedtime  silver sulfADIAZINE 1% Cream 1 Application(s) Topical daily  sodium zirconium cyclosilicate 10 Gram(s) Oral daily        Allergies:  aspirin (Rash; Other)  penicillins (Rash; Urticaria; Hives; Blisters)  latex (Unknown)  EKG leads (Hives)      Past Medical History:  Pressure injury of skin    Acquired absence of other specified parts of digestive tract    Acute gastritis with bleeding    Acute kidney failure, unspecified    Acute posthemorrhagic anemia    Acute sinusitis, unspecified    Acute upper respiratory infection, unspecified    Allergic rhinitis, unspecified    Allergy status to analgesic agent    Allergy status to penicillin    Anemia, unspecified    Angina pectoris, unspecified    Angiodysplasia of stomach and duodenum with bleeding    Atherosclerotic heart disease of native coronary artery without angina pectoris    Bicipital tendinitis, right shoulder    Body mass index [BMI] 34.0-34.9, adult    Body mass index [BMI] 36.0-36.9, adult    Body mass index [BMI] 37.0-37.9, adult    Body mass index [BMI] 39.0-39.9, adult    Body mass index [BMI] 40.0-44.9, adult    Body mass index [BMI] 45.0-49.9, adult    Body mass index [BMI] 50.0-59.9, adult    Carpal tunnel syndrome, unspecified upper limb    Cervical disc disorder with radiculopathy, unspecified cervical region    Changes in skin texture    Chest pain, unspecified    Chronic kidney disease, stage 4 (severe)    Chronic kidney disease, unspecified    Contact with and (suspected) exposure to covid-19    COVID-19    Decreased white blood cell count, unspecified    Dermatitis, unspecified    Diverticulosis of intestine, part unspecified, without perforation or abscess with bleeding    Encounter for adjustment and management of vascular access device    Encounter for general adult medical examination without abnormal findings    Encounter for other preprocedural examination    Encounter for screening for depression    Enlarged lymph nodes, unspecified    Gastritis, unspecified, without bleeding    Gastro-esophageal reflux disease with esophagitis, without bleeding    Gastro-esophageal reflux disease without esophagitis    Gastrointestinal hemorrhage, unspecified    Gout, unspecified    Heart failure, unspecified    Hyperkalemia    Hypertensive chronic kidney disease with stage 1 through stage 4 chronic kidney disease, or unspecified chronic kidney disease    Iron deficiency    Iron deficiency anemia secondary to blood loss (chronic)    Iron deficiency anemia, unspecified    Latex allergy status    Localized edema    Nonrheumatic aortic (valve) stenosis    Obesity, unspecified    Other bursitis of hip, left hip    Other forms of dyspnea    Other long term (current) drug therapy    Other muscle spasm    Other shoulder lesions, unspecified shoulder    Personal history of COVID-19    Personal history of other diseases of the respiratory system    Personal history of other specified conditions    Radiculopathy, cervical region    Rash and other nonspecific skin eruption    Sciatica, unspecified side    Second degree hemorrhoids    Shortness of breath    Thrombocytopenia, unspecified    Unspecified abdominal hernia without obstruction or gangrene    Unspecified contact dermatitis, unspecified cause    Unspecified osteoarthritis, unspecified site    Unspecified otitis externa, right ear    Unvaccinated for covid-19    Sex Assigned At Birth    Alcohol history    Angiodysplasia of stomach and duodenum without bleeding    Cellulitis of right lower limb    Long term (current) use of aspirin    Other specified soft tissue disorders    Personal history of other diseases of the circulatory system    Personal history of other diseases of the musculoskeletal system and connective tissue    Arteriovenous malformation, site unspecified    Nausea    DNI: Trial NIV    No pertinent family history in first degree relatives    FH: kidney disease (Father)    FH: breast cancer (Mother)    FH: multiple myeloma (Mother)    Handoff    MEWS Score    HTN (hypertension)    Heart murmur    Eczema    Anemia    Aortic stenosis    Chronic kidney disease (CKD)    2019 novel coronavirus disease (COVID-19)    Gastric AVM    Pressure ulcer    Pressure ulcer    Acute kidney injury superimposed on CKD    Encounter for palliative care    H/O appendicitis    H/O cholecystitis    S/P cholecystectomy    History of esophagogastroduodenoscopy (EGD)    H/O colonoscopy    H/O  section    History of appendectomy    Port-A-Cath in place      Review of Systems:  General: no fevers or chills  Eyes: no diplopia or blurred vision  ENT: no rhinorrhea  CV: no chest pain  Resp: no cough or dyspnea  GI: no abdominal pain, constipation, or diarrhea  : urinary incontinence   Neuro: weakness,       Physical Exam:  T(C): 36.1 (24 @ 08:22), Max: 36.8 (24 @ 23:57)  HR: 62 (24 @ 08:22) (62 - 94)  BP: 98/43 (24 @ 08:22) (98/43 - 116/65)  RR: 18 (24 @ 08:22) (18 - 18)  SpO2: 97% (24 @ 08:22) (96% - 97%)  Gen: NAD  Eyes: no scleral icterus  Head:  Normocephalic / Atraumatic  CV: no JVD  Lungs: nonlabored breathing  Abdomen: obese  : no culver catheter in place  Back: tenderness to palpation  Neuro: AOx2  Extremities: limited ROM in upper/lower extremities  Psych: drowsy      Labs:  CBC  6.33 K/uL [4.80 - 10.80] > 7.3 g/dL<L> [12.0 - 16.0] / 24.4 %<L> [37.0 - 47.0] < 178 K/uL [130 - 400]      BMP  142 mmol/L [135 - 146] | 108 mmol/L [98 - 110] | --  5.3 mmol/L<H> [3.5 - 5.0] | 23 mmol/L [17 - 32] | 2.6 mg/dL<H> [0.7 - 1.5]    102 mg/dL<H> [70 - 99]  CBC      BMP  142 mmol/L [135 - 146] | 108 mmol/L [98 - 110] | 108 mg/dL<HH> [10 - 20]  5.2 mmol/L<H> [3.5 - 5.0] | 22 mmol/L [17 - 32] | 2.7 mg/dL<H> [0.7 - 1.5]    127 mg/dL<H> [70 - 99]            Opioid Risk Assessment Tool                                                                           Female       Male  Family History  Alcohol                                                              1                3  Illegal drugs                                                       2                3  Rx drugs                                                            4                4    Personal History   Alcohol                                                              3                3  Illegal drugs                                                       4                4  Rx drugs                                                            5                5    Age between 16—45 years                                1                1  History of preadolescent sexual abuse               3                0    Psychological disease  ADD, OCD, bipolar, schizophrenia                      2                2  Depression                                                       1                1    Total Score                                                     0             __    0 - 3 = low risk for future opioid abuse  4 - 7 = moderate risk for future opioid abuse  8+ = high risk for future opioid abuse

## 2024-08-09 NOTE — PROGRESS NOTE ADULT - ASSESSMENT
78-year-old female with past medical history of   CKD, HTN, CCY, gout,  severe AS being planned for tAVR, and history of severe anemia secondary to chronic upper GI bleeding due to  AV malformation gastric body dieulafoy lesion, weekly blood transfusions presenting to ER for a rupture pressure ulcer of the buttock area.   Patient states that last night was on her toilet which cracked while she was sitting on it, and patient was unable to get off the toilet for 4 hours.  By the time EMS arrived skin had ulcerated, with a blister that ruptured predominantly to the left buttock area (appears to be like burn blister that ruptured) with linear abrasions to the bilateral upper thighs/buttocks.  Patient in so much pain that she cannot stand or walk,    # Bilateral buttocks and sacrum deep Tissue Injury  wound team input appreciated (Bilateral buttocks and sacrum region (mimicking toilet seat) )   ID inpout appreciated , hold antibiotics -  wounds without any gross signs of infection    - Cleanse wound to bilateral buttocks and sacrum with Vashe wound cleanser. Pat dry, apply Silvadene, cover with Xeroform and abdominal pad twice a day and prn for soiling.   frequent turning, off loading,and positioning and skin care as per protocol, Maintain pressure injury prevention, Keep skin clean, Offload heels, Monitor wound for changes and notify provider if any   nutrition support   IV tylenol given   Burn team consult, pending final reccs     # Acute kidney inury on CKD stage 4, prerenal  # Rhabdomylosis- resolved  # Hyperkalemia  - Creatine Kinase normalized   - US Kidney and Bladder (07.30.24 @ 07:49) >No definite hydronephrosis  - S/p IV fluids  nephro on board - recc appreciated   - c/w  lasix   - monitor UO  - monitor BMP  - low k diet  - lokelma 10 q 24 if repeated k > 5.5 as per nephro - given  today   - monitor k  Monitor phosphoros   Creatinine Trend: 2.8<--, 2.6<--, 2.5<--, 3.0<--, 3.1<--, 3.2<--       [] Pt noted sleepy from 8/6 as per nursing staff   ABG wnl / TSH 4.36  / CT HEAD negative for acute  AND   Neurology consult appreciated - f/u ammonia level   CXR noted , c/w holding lasix today   Neurochecs   no fever no leukocytosis   CTAP done and reported-  No evidence of an intraperitoneal or retroperitoneal hematoma; CT evidence of anemia as above.  Moderate rectal stool burden with associated rectal wall thickening and adjacent edema. Findings may reflect developing stercoral proctitis.  geive bowel regemin and consult GI       # Peripheral neuropathy  could be secondary to B12 deficiency   -  neurontin- held for sleepiness   - c/w B12, folate supplement   - evaluated by PT : STR  X RAY R ankle for reported pain     # Acute on chronic  anemia  # Non erosive gastritis  # Severe diverticulosis  # Grade/Stage I external hemorrhoids  # H/o  AVMs  # Colon polyps  # History of perisplenic Varices in 2023   b12 and folate deficiency- c/w supplement    -  EGD-Colonoscopy (07.22.24 @ 12:30) >Erythema in the stomach compatible with non-erosive gastritis. 2 non-bleeding 7mm AVMs were seen at the second portion of the duodenum cauterization was successfully applied to ensurehemostasis. Polyps (5 mm) in the ascending colon. (Polypectomy). Polyps (10 mm) in the ascending colon. (Polypectomy). Polyp (10 mm) in the ascending colon. (Polypectomy, Endoclip). 3 non-bleeding AVMs ranging in size between 7-10mm were seen in the cecum.Using APC probe, cauterization was successfully performed to ensure hemostasis. 3 non-bleeding AVMs ranging in size between 7-105 mm were seen in theascending colon. Using APC probe, cauterization was successfully performed to ensure hemostasis. Severe diverticulosis of the the left side of the colon. Grade/Stage I external hemorrhoids.  - c/w protonix  - s/p 1U pRBC on 8/2   completed course  IV Venofer - f/u with nephro for  WOODY - start WOODY   c/wb12 and folate replacement for vit b12 and folate deficiency  op hematology and GI    keep type and screen active     # Chronic diastolic CHF  # Severe aortic stenosis   -  TTE Echo Complete w/o Contrast w/ Doppler (08.01.24 @ 11:03) >EF of 56 %. Mild asymmetric left ventricular hypertrophy. Grade II diastolic dysfunction). Severe aortic valve stenosis , Mild to moderate mitral valve regurgitation. Moderate aortic regurgitation.  - Patient to follow up with Dr. Hernandez as an outpatient for TAVR  - c/w metoprolol, lasix    # Thrombocytopenia- resolved    # DVT prophylaxis- HEPARIN SQ-  LE duplex  negative     GOC : DNR/ DNI with NIV  palliative team consult - Appreciated   today I spokw with the other patient son Tello Reeves [phone]     # Pending: monitor mental status, GI , NEPHRO neurology , Bowel movment   BMP, CBC ,   # Disposition: STR- PT eval

## 2024-08-09 NOTE — PROGRESS NOTE ADULT - ASSESSMENT
78-year-old female with past medical history of   CKD, HTN, CCY, gout,  severe AS being planned for TAVR, and history of severe anemia secondary to chronic upper GI bleeding due to  AV malformation gastric body dieulafoy lesion, weekly blood transfusions presenting to ER for a rupture pressure ulcer of the buttock area.  #EVAN on CKD4  - creat improved to baseline  - no hydro on renal/bladder sono   - non oliguric   - monitor bladder scan q shift   - continue lasix 40  - phos ok, monitor  - maintain renal diet  - start lokelma 10 q 24 if repeated k > 5.5  - completed course of venofer / start epo 10K units subq wkly  no indication for RRT   will follow

## 2024-08-09 NOTE — PROGRESS NOTE ADULT - SUBJECTIVE AND OBJECTIVE BOX
Nephrology Progress Note    LATRICIA ARREAGA  MRN-[id number]  78y  Female    S:  Patient is seen and examined, events over the last 24h noted.    O:  Allergies:  aspirin (Rash; Other)  penicillins (Rash; Urticaria; Hives; Blisters)  latex (Unknown)  EKG leads (Hives)    Hospital Medications:   MEDICATIONS  (STANDING):  chlorhexidine 2% Cloths 1 Application(s) Topical daily  cyanocobalamin 1000 MICROGram(s) Oral daily  folic acid 1 milliGRAM(s) Oral daily  gabapentin 100 milliGRAM(s) Oral daily  heparin   Injectable 5000 Unit(s) SubCutaneous every 12 hours  metoprolol tartrate 12.5 milliGRAM(s) Oral every 12 hours  pantoprazole    Tablet 40 milliGRAM(s) Oral before breakfast  polyethylene glycol 3350 17 Gram(s) Oral daily  senna 2 Tablet(s) Oral at bedtime  silver sulfADIAZINE 1% Cream 1 Application(s) Topical daily  sodium zirconium cyclosilicate 10 Gram(s) Oral daily    MEDICATIONS  (PRN):  acetaminophen     Tablet .. 650 milliGRAM(s) Oral every 6 hours PRN Mild Pain (1 - 3)    Home Medications:  furosemide 40 mg oral tablet: 1 tab(s) orally once a day (2024 04:41)  metoprolol tartrate 25 mg oral tablet: 1 tab(s) orally once a day (2024 04:41)      VITALS:  Daily     Daily Weight in k (08 Aug 2024 12:35)  T(F): 97 (24 @ 08:22), Max: 98.3 (24 @ 23:57)  HR: 62 (24 @ 08:22)  BP: 98/43 (24 @ 08:22)  RR: 18 (24 @ 08:22)  SpO2: 97% (24 @ 08:22)  Wt(kg): --  I&O's Detail    09 Aug 2024 07:01  -  09 Aug 2024 09:21  --------------------------------------------------------  IN:  Total IN: 0 mL    OUT:    Voided (mL): 700 mL  Total OUT: 700 mL    Total NET: -700 mL        I&O's Summary    09 Aug 2024 07:01  -  09 Aug 2024 09:21  --------------------------------------------------------  IN: 0 mL / OUT: 700 mL / NET: -700 mL        Weight (kg): 101 ( @ 12:35)    PHYSICAL EXAM:  Gen: NAD  Chest: b/l breath sounds  Abd: soft  Extremities: no edema  Vascular access:       LABS:  ABG - ( 08 Aug 2024 12:54 )  pH, Arterial: 7.36  pH, Blood: x     /  pCO2: 44    /  pO2: 89    / HCO3: 25    / Base Excess: -0.8  /  SaO2: 96.9              Blood Gas Profile w/Lytes - Venous: Performed in Lab (24 @ 17:37)  Blood Gas Venous - Sodium: 140 mmol/L (24 @ 17:37)  Blood Gas Venous - Potassium: 5.4 mmol/L (24 @ 17:37)        142  |  108  |  107<HH>  ----------------------------<  102<H>  5.3<H>   |  23  |  2.6<H>    Ca    8.3<L>      09 Aug 2024 07:43  Phos  5.3       Mg     2.7         TPro  4.9<L>  /  Alb  2.4<L>  /  TBili  0.4  /  DBili      /  AST  19  /  ALT  12  /  AlkPhos  73      eGFR: 18 mL/min/1.73m2 (24 @ 07:43)  eGFR: 17 mL/min/1.73m2 (24 @ 18:26)    Phosphorus: 5.3 mg/dL (24 @ 07:43)  Phosphorus: 5.0 mg/dL (24 @ 06:41)    Vitamin D, 25-Hydroxy: <6 ng/mL (23 @ 09:19)  Intact PTH: 163 pg/mL (23 @ 09:19)                          7.3    6.33  )-----------( 178      ( 09 Aug 2024 07:43 )             24.4     Mean Cell Volume: 98.8 fL (24 @ 07:43)    Iron Total: 23 ug/dL (24 @ 14:29)  % Saturation, Iron: 14 % (24 @ 14:29)      % Saturation, Iron: 14 % (24 @ 14:29)  % Saturation, Iron: 11 % (23 @ 00:45)    Urine Studies:  Protein, Urine: Negative mg/dL (24 @ 11:00)  White Blood Cell - Urine: 208 /HPF (24 @ 11:00)  Red Blood Cell - Urine: 0 /HPF (24 @ 11:00)  Protein, Urine: 100 mg/dL (24 @ 17:58)  White Blood Cell - Urine: 407 /HPF (24 @ 17:58)  Red Blood Cell - Urine: 2 /HPF (24 @ 17:58)  Protein, Urine: Negative (23 @ 23:46)  Protein, Urine: Negative (10-13-22 @ 19:30)  White Blood Cell - Urine: 11 /HPF (10-13-22 @ 19:30)  Red Blood Cell - Urine: 1 /HPF (10-13-22 @ 19:30)  Protein, Urine: Negative (03-10-22 @ 18:13)  Protein, Urine: 30 mg/dL (22 @ 11:51)  White Blood Cell - Urine: 1 /HPF (22 @ 11:51)  Red Blood Cell - Urine: 1 /HPF (22 @ 11:51)      Urea Nitrogen,  Random Urine: 633 mg/dL (24 @ 11:00)    Sodium, Random Urine: 35.0 mmoL/L ( @ 11:00)  Creatinine, Random Urine: 67 mg/dL ( @ 11:00)    Creatinine trend:  Creatinine: 2.6 mg/dL (24 @ 07:43)  Creatinine: 2.7 mg/dL (24 @ 18:26)  Creatinine: 2.8 mg/dL (24 @ 06:41)  Creatinine: 2.6 mg/dL (24 @ 13:47)  Creatinine: 2.5 mg/dL (24 @ 10:35)  Creatinine: 3.0 mg/dL (24 @ 10:19) Nephrology Progress Note    LATRICIA ARREAGA  MRN-[id number]  78y  Female    S:  Patient is seen and examined, events over the last 24h noted.    O:  Allergies:  aspirin (Rash; Other)  penicillins (Rash; Urticaria; Hives; Blisters)  latex (Unknown)  EKG leads (Hives)    Hospital Medications:   MEDICATIONS  (STANDING):  chlorhexidine 2% Cloths 1 Application(s) Topical daily  cyanocobalamin 1000 MICROGram(s) Oral daily  folic acid 1 milliGRAM(s) Oral daily  gabapentin 100 milliGRAM(s) Oral daily  heparin   Injectable 5000 Unit(s) SubCutaneous every 12 hours  metoprolol tartrate 12.5 milliGRAM(s) Oral every 12 hours  pantoprazole    Tablet 40 milliGRAM(s) Oral before breakfast  polyethylene glycol 3350 17 Gram(s) Oral daily  senna 2 Tablet(s) Oral at bedtime  silver sulfADIAZINE 1% Cream 1 Application(s) Topical daily  sodium zirconium cyclosilicate 10 Gram(s) Oral daily    MEDICATIONS  (PRN):  acetaminophen     Tablet .. 650 milliGRAM(s) Oral every 6 hours PRN Mild Pain (1 - 3)    Home Medications:  furosemide 40 mg oral tablet: 1 tab(s) orally once a day (2024 04:41)  metoprolol tartrate 25 mg oral tablet: 1 tab(s) orally once a day (2024 04:41)      VITALS:  Daily     Daily Weight in k (08 Aug 2024 12:35)  T(F): 97 (24 @ 08:22), Max: 98.3 (24 @ 23:57)  HR: 62 (24 @ 08:22)  BP: 98/43 (24 @ 08:22)  RR: 18 (24 @ 08:22)  SpO2: 97% (24 @ 08:22)  Wt(kg): --  I&O's Detail    09 Aug 2024 07:01  -  09 Aug 2024 09:21  --------------------------------------------------------  IN:  Total IN: 0 mL    OUT:    Voided (mL): 700 mL  Total OUT: 700 mL    Total NET: -700 mL        I&O's Summary    09 Aug 2024 07:01  -  09 Aug 2024 09:21  --------------------------------------------------------  IN: 0 mL / OUT: 700 mL / NET: -700 mL        Weight (kg): 101 ( @ 12:35)    PHYSICAL EXAM:  Gen: NAD  Chest: b/l breath sounds  Abd: soft  Extremities: edema      LABS:  ABG - ( 08 Aug 2024 12:54 )  pH, Arterial: 7.36  pH, Blood: x     /  pCO2: 44    /  pO2: 89    / HCO3: 25    / Base Excess: -0.8  /  SaO2: 96.9            142  |  108  |  107<HH>  ----------------------------<  102<H>  5.3<H>   |  23  |  2.6<H>    Ca    8.3<L>      09 Aug 2024 07:43  Phos  5.3       Mg     2.7         TPro  4.9<L>  /  Alb  2.4<L>  /  TBili  0.4  /  DBili      /  AST  19  /  ALT  12  /  AlkPhos  73      Phosphorus: 5.3 mg/dL (24 @ 07:43)  Phosphorus: 5.0 mg/dL (24 @ 06:41)                          7.3    6.33  )-----------( 178      ( 09 Aug 2024 07:43 )             24.4     Mean Cell Volume: 98.8 fL (24 @ 07:43)    % Saturation, Iron: 14 % (24 @ 14:29)  Ferritin: 170 ng/mL (24 @ 14:29)      Creatinine trend:  Creatinine: 2.6 mg/dL (24 @ 07:43)  Creatinine: 2.7 mg/dL (24 @ 18:26)  Creatinine: 2.8 mg/dL (24 @ 06:41)  Creatinine: 2.6 mg/dL (24 @ 13:47)  Creatinine: 2.5 mg/dL (24 @ 10:35)  Creatinine: 3.0 mg/dL (24 @ 10:19)

## 2024-08-09 NOTE — PROGRESS NOTE ADULT - SUBJECTIVE AND OBJECTIVE BOX
Neurology Progress Note    Interval History: Patient examined at bedside. She is more somnolent as compared to yesterday. She is able to state her name but not oriented to place or time. She notes having pain in both buttocks and left ankle.     PAST MEDICAL & SURGICAL HISTORY:  HTN (hypertension)    Heart murmur    Eczema    Anemia  iron infusions and PRBC transfusions    Aortic stenosis    Chronic kidney disease (CKD)    2019 novel coronavirus disease (COVID-19)  2020- REH[facility] admission    Gastric AVM    H/O appendicitis    S/P cholecystectomy    History of esophagogastroduodenoscopy (EGD)    H/O colonoscopy    H/O  section    History of appendectomy    Port-A-Cath in place  right CW    Medications:  acetaminophen     Tablet .. 650 milliGRAM(s) Oral every 6 hours PRN  chlorhexidine 2% Cloths 1 Application(s) Topical daily  cyanocobalamin 1000 MICROGram(s) Oral daily  folic acid 1 milliGRAM(s) Oral daily  gabapentin 100 milliGRAM(s) Oral daily  heparin   Injectable 5000 Unit(s) SubCutaneous every 12 hours  lidocaine 1%/epinephrine 1:100,000 Inj 3 Vial(s) Local Injection once  metoprolol tartrate 12.5 milliGRAM(s) Oral every 12 hours  pantoprazole    Tablet 40 milliGRAM(s) Oral before breakfast  polyethylene glycol 3350 17 Gram(s) Oral daily  senna 2 Tablet(s) Oral at bedtime  sodium zirconium cyclosilicate 10 Gram(s) Oral daily    Vital Signs Last 24 Hrs  T(C): 36.1 (09 Aug 2024 08:22), Max: 36.8 (08 Aug 2024 23:57)  T(F): 97 (09 Aug 2024 08:22), Max: 98.3 (08 Aug 2024 23:57)  HR: 62 (09 Aug 2024 08:22) (62 - 94)  BP: 98/43 (09 Aug 2024 08:22) (98/43 - 116/65)  BP(mean): --  RR: 18 (09 Aug 2024 08:22) (18 - 18)  SpO2: 97% (09 Aug 2024 08:22) (96% - 97%)    Parameters below as of 09 Aug 2024 08:22  Patient On (Oxygen Delivery Method): room air    NEURO:     MENTAL STATUS: Somnolent, oriented to person, not oriented to place or time. Patient is confused throughout exam, able to follow simple commands with encouragement.   Speech: Fluent, intact naming, repetition, and comprehension. Attention and concentration impaired.  Cranial Nerves:     - II: Pupils 3mm, equal and reactive, no RAPD, normal visual field and fundus     - III, IV, VI: EOM intact, no gaze preference or deviation     - V: normal     - VII: no facial asymmetry     - VIII: mildly impaired hearing to speech   Motor Exam: 4+/5 in bilateral UE, 4-/5 in bilateral LE. R ankle strength difficult to assess due to pain.   Reflexes: 1+ in bilateral UE and LE.  Muscle tone: Normal all over. No asymmetry is seen.    Sensory: Bilateral intact to light touch, vibration, and temperature  Gait: Unable to assess due to leg pain  Coordination: Finger-to-nose testing shows mild dysmetria, + asterixis    Labs:  CBC Full  -  ( 09 Aug 2024 07:43 )  WBC Count : 6.33 K/uL  RBC Count : 2.47 M/uL  Hemoglobin : 7.3 g/dL  Hematocrit : 24.4 %  Platelet Count - Automated : 178 K/uL  Mean Cell Volume : 98.8 fL  Mean Cell Hemoglobin : 29.6 pg  Mean Cell Hemoglobin Concentration : 29.9 g/dL  Auto Neutrophil # : 4.65 K/uL  Auto Lymphocyte # : 0.62 K/uL  Auto Monocyte # : 0.46 K/uL  Auto Eosinophil # : 0.54 K/uL  Auto Basophil # : 0.01 K/uL  Auto Neutrophil % : 73.4 %  Auto Lymphocyte % : 9.8 %  Auto Monocyte % : 7.3 %  Auto Eosinophil % : 8.5 %  Auto Basophil % : 0.2 %        142  |  108  |  107<HH>  ----------------------------<  102<H>  5.3<H>   |  23  |  2.6<H>    Ca    8.3<L>      09 Aug 2024 07:43  Phos  5.3       Mg     2.7         TPro  4.9<L>  /  Alb  2.4<L>  /  TBili  0.4  /  DBili  x   /  AST  19  /  ALT  12  /  AlkPhos  73      LIVER FUNCTIONS - ( 09 Aug 2024 07:43 )  Alb: 2.4 g/dL / Pro: 4.9 g/dL / ALK PHOS: 73 U/L / ALT: 12 U/L / AST: 19 U/L / GGT: x             Urinalysis Basic - ( 09 Aug 2024 07:43 )    Color: x / Appearance: x / SG: x / pH: x  Gluc: 102 mg/dL / Ketone: x  / Bili: x / Urobili: x   Blood: x / Protein: x / Nitrite: x   Leuk Esterase: x / RBC: x / WBC x   Sq Epi: x / Non Sq Epi: x / Bacteria: x

## 2024-08-10 PROCEDURE — 99233 SBSQ HOSP IP/OBS HIGH 50: CPT

## 2024-08-10 RX ADMIN — Medication 100 MILLIGRAM(S): at 11:11

## 2024-08-10 RX ADMIN — ACETAMINOPHEN 1000 MILLIGRAM(S): 325 TABLET ORAL at 05:22

## 2024-08-10 RX ADMIN — CHLORHEXIDINE GLUCONATE 1 APPLICATION(S): 40 SOLUTION TOPICAL at 11:11

## 2024-08-10 RX ADMIN — Medication 1000 MICROGRAM(S): at 11:11

## 2024-08-10 RX ADMIN — Medication 2 TABLET(S): at 21:33

## 2024-08-10 RX ADMIN — Medication 1 MILLIGRAM(S): at 11:11

## 2024-08-10 RX ADMIN — METOPROLOL TARTRATE 12.5 MILLIGRAM(S): 100 TABLET ORAL at 17:21

## 2024-08-10 RX ADMIN — ACETAMINOPHEN 1000 MILLIGRAM(S): 325 TABLET ORAL at 14:16

## 2024-08-10 RX ADMIN — ACETAMINOPHEN 1000 MILLIGRAM(S): 325 TABLET ORAL at 21:32

## 2024-08-10 RX ADMIN — POLYETHYLENE GLYCOL 3350 17 GRAM(S): 17 POWDER, FOR SOLUTION ORAL at 11:11

## 2024-08-10 RX ADMIN — Medication 20 GRAM(S): at 11:11

## 2024-08-10 RX ADMIN — Medication 20 GRAM(S): at 05:22

## 2024-08-10 RX ADMIN — SODIUM ZIRCONIUM CYCLOSILICATE 10 GRAM(S): 5 POWDER, FOR SUSPENSION ORAL at 11:11

## 2024-08-10 RX ADMIN — ACETAMINOPHEN 1000 MILLIGRAM(S): 325 TABLET ORAL at 13:07

## 2024-08-10 RX ADMIN — Medication 20 GRAM(S): at 17:19

## 2024-08-10 RX ADMIN — Medication 5000 UNIT(S): at 17:20

## 2024-08-10 RX ADMIN — Medication 40 MILLIGRAM(S): at 05:22

## 2024-08-10 RX ADMIN — Medication 5000 UNIT(S): at 05:22

## 2024-08-10 NOTE — PROGRESS NOTE ADULT - SUBJECTIVE AND OBJECTIVE BOX
T H I S   I S    N O  T   A    F I N A L I Z E D   N O T E      LATRICAI ARREAGA  78y, Female  Allergy: aspirin (Rash; Other)  penicillins (Rash; Urticaria; Hives; Blisters)  latex (Unknown)  EKG leads (Hives)    Hospital Day: 12d    Patient seen and examined earlier today.     PMH/PSH:  PAST MEDICAL & SURGICAL HISTORY:  HTN (hypertension)      Heart murmur      Eczema      Anemia  iron infusions and PRBC transfusions      Aortic stenosis      Chronic kidney disease (CKD)      2019 novel coronavirus disease (COVID-19)  2020- REHAB admission      Gastric AVM      H/O appendicitis      S/P cholecystectomy      History of esophagogastroduodenoscopy (EGD)      H/O colonoscopy      H/O  section      History of appendectomy      Port-A-Cath in place  right CW          LAST 24-Hr EVENTS:    VITALS:  T(F): 95.7 (08-10-24 @ 08:37), Max: 97.3 (24 @ 23:59)  HR: 74 (08-10-24 @ 08:37)  BP: 103/50 (08-10-24 @ 08:37) (96/42 - 120/55)  RR: 18 (08-10-24 @ 08:37)  SpO2: 98% (08-10-24 @ 08:37)          TESTS & MEASUREMENTS:  Weight/BMI  101 (24 @ 12:35)    24 @ 07:01  -  08-10-24 @ 07:00  --------------------------------------------------------  IN: 0 mL / OUT: 1500 mL / NET: -1500 mL                            7.3    6.33  )-----------( 178      ( 09 Aug 2024 07:43 )             24.4             142  |  107  |  105<HH>  ----------------------------<  144<H>  5.1<H>   |  23  |  2.7<H>    Ca    8.4      09 Aug 2024 17:47  Phos  5.3     08  Mg     2.7     -    TPro  4.9<L>  /  Alb  2.4<L>  /  TBili  0.4  /  DBili  x   /  AST  19  /  ALT  12  /  AlkPhos  73      LIVER FUNCTIONS - ( 09 Aug 2024 07:43 )  Alb: 2.4 g/dL / Pro: 4.9 g/dL / ALK PHOS: 73 U/L / ALT: 12 U/L / AST: 19 U/L / GGT: x                 Urinalysis Basic - ( 09 Aug 2024 17:47 )    Color: x / Appearance: x / SG: x / pH: x  Gluc: 144 mg/dL / Ketone: x  / Bili: x / Urobili: x   Blood: x / Protein: x / Nitrite: x   Leuk Esterase: x / RBC: x / WBC x   Sq Epi: x / Non Sq Epi: x / Bacteria: x          Ferritin: 170 ng/mL (24 @ 14:29)                    RADIOLOGY, ECG, & ADDITIONAL TESTS:  12 Lead ECG:   Ventricular Rate 78 BPM    Atrial Rate 78 BPM    P-R Interval 186 ms    QRS Duration 94 ms    Q-T Interval 438 ms    QTC Calculation(Bazett) 499 ms    P Axis 70 degrees    R Axis -42 degrees    T Axis -35 degrees    Diagnosis Line Sinus rhythm withfrequent Premature ventricular complexes  Left axis deviation  Incomplete right bundle branch block  Minimal voltage criteria for LVH, may be normal variant  ST & T wave abnormality, consider inferior ischemia  Abnormal ECG    Confirmed by El Zhao ([id number]) on 2024 6:30:32 PM (24 @ 16:05)    CT Abdomen and Pelvis No Cont:   ACC: [id number] EXAM:  CT ABDOMEN AND PELVIS   ORDERED BY: JHON MCKEON     PROCEDURE DATE:  2024          INTERPRETATION:  CLINICAL STATEMENT: Anemia. Evaluate for intra-abdominal   or retroperitoneal bleed.    TECHNIQUE: Contiguous axial CT images were obtained from the lower chest   to the pubic symphysis without intravenous contrast. Oral contrast was   not administered.  Reformatted images in the coronal and sagittal planes   were acquired.    COMPARISON CT: 2023    OTHER STUDIES USED FOR CORRELATION: None.      FINDINGS:  Limited evaluation of solid organs and vascular structures secondary to   lack of intravenous contrast.    LOWER CHEST: Hypodensity of the cardiac blood pool relative to the   ventricular wall, compatible with CT evidence of anemia. Partially imaged   right atrial/ventricular catheter. Aortic valve calcifications.   Cardiomegaly.    HEPATOBILIARY: Post cholecystectomy. Unremarkable liver.    SPLEEN: Unremarkable.    PANCREAS: Unremarkable.    ADRENALGLANDS: Unchanged 1.7 cm left adrenal gland nodule.    KIDNEYS: No nephroureteral calculi or hydronephrosis. Left renal cyst.    ABDOMINOPELVIC NODES: Unchanged scattered mildly prominent gastrohepatic   ligament lymph nodes.    PELVIC ORGANS: Small calcified uterine fibroid. Unremarkable urinary   bladder.    PERITONEUM/MESENTERY/BOWEL: Stool-filled distended rectum measuring 7.5   cm in diameter. Mild rectal wall thickening and presacral edema. No bowel   obstruction, ascites or pneumoperitoneum. Colonic diverticulosis, without   evidence for acute diverticulitis. Perigastric and splenorenal varices.   No evidence of an intraperitoneal or retroperitoneal hematoma. Metallic   density within the gastric fundus.    BONES/SOFT TISSUES: Degenerative changes of the spine noted. Avascular   necrosis of the right femoral head, stable. Degenerative changes of the   spine noted. Mild soft tissue anasarca.    OTHER: Scattered atherosclerotic vascular calcifications.      IMPRESSION:  1.  No evidence of an intraperitoneal or retroperitoneal hematoma; CT   evidence of anemia as above.  2.  Moderate rectal stool burden with associated rectal wall thickening   and adjacent edema. Findings may reflect developing stercoral proctitis.  3.  Additionalincidental findings as above.    --- End of Report ---            GERALDINE DAVIS MD; Attending Radiologist  This document has been electronically signed. Aug  9 2024  2:16PM (24 @ 11:43)  CT Head No Cont:   ACC: [id number] EXAM:  CT BRAIN   ORDERED BY: ENA LIRIANO     PROCEDURE DATE:  2024          INTERPRETATION:  Clinical History / Reason for exam: Weakness and   sleepiness.    Technique: Noncontrast head CT.  Contiguous CT axial images of the head   from the base to the vertex with coronal and sagittal reformats.    Comparison/correlation: CT head 2009    Findings:    The ventricles and cortical sulci are normal in size and configuration.    There is no acute intracranial hemorrhage, extra-axial fluid collection   or midline shift.  Gray-white matter differentiation is maintained.    The visualized paranasal sinuses and mastoids are clear.    IMPRESSION:    No evidence of acute intracranial pathology.    --- End of Report ---            GILLES PETERSEN MD; Attending Radiologist  This document has been electronically signed. Aug  8 2024  3:06PM (24 @ 14:44)    RECENT DIAGNOSTIC ORDERS:  Magnesium: Repeat From: 09-Aug-2024 14:31 To: 12-Aug-2024 04:30, Every 1 day(s) (24 @ 14:32)  Comprehensive Metabolic Panel: Repeat From: 09-Aug-2024 14:31 To: 12-Aug-2024 04:30, Every 1 day(s) (24 @ 14:32)  Complete Blood Count + Automated Diff: Repeat From: 09-Aug-2024 14:31 To: 12-Aug-2024 04:30, Every 1 day(s) (24 @ 14:32)  Xray Ankle Complete 3 Views, Right: Routine   Indication: r/o fracture  Transport: Wheelchair  Exam Completed (24 @ 14:05)      MEDICATIONS:  MEDICATIONS  (STANDING):  acetaminophen     Tablet .. 1000 milliGRAM(s) Oral every 8 hours  chlorhexidine 2% Cloths 1 Application(s) Topical daily  cyanocobalamin Injectable 1000 MICROGram(s) IntraMuscular daily  epoetin raquel (EPOGEN) Injectable 29506 Unit(s) SubCutaneous every 7 days  folic acid 1 milliGRAM(s) Oral daily  gabapentin 100 milliGRAM(s) Oral daily  heparin   Injectable 5000 Unit(s) SubCutaneous every 12 hours  lactulose Syrup 20 Gram(s) Oral every 6 hours  lidocaine 1%/epinephrine 1:100,000 Inj 3 Vial(s) Local Injection once  metoprolol tartrate 12.5 milliGRAM(s) Oral every 12 hours  pantoprazole    Tablet 40 milliGRAM(s) Oral before breakfast  polyethylene glycol 3350 17 Gram(s) Oral daily  senna 2 Tablet(s) Oral at bedtime  sodium zirconium cyclosilicate 10 Gram(s) Oral daily    MEDICATIONS  (PRN):      HOME MEDICATIONS:  furosemide 40 mg oral tablet ()  metoprolol tartrate 25 mg oral tablet ()  pantoprazole 40 mg oral delayed release tablet ()      PHYSICAL EXAM:  GENERAL:   CHEST/LUNG:   HEART:   ABDOMEN:   EXTREMITIES:               LATRICIA ARREAGA  78y, Female  Allergy: aspirin (Rash; Other)  penicillins (Rash; Urticaria; Hives; Blisters)  latex (Unknown)  EKG leads (Hives)    Hospital Day: 12d    Patient seen and examined earlier today. she is more awake     PMH/PSH:  PAST MEDICAL & SURGICAL HISTORY:  HTN (hypertension)      Heart murmur      Eczema      Anemia  iron infusions and PRBC transfusions      Aortic stenosis      Chronic kidney disease (CKD)      2019 novel coronavirus disease (COVID-19)  2020- RE[facility] admission      Gastric AVM      H/O appendicitis      S/P cholecystectomy      History of esophagogastroduodenoscopy (EGD)      H/O colonoscopy      H/O  section      History of appendectomy      Port-A-Cath in place  right CW          LAST 24-Hr EVENTS:    VITALS:  T(F): 95.7 (08-10-24 @ 08:37), Max: 97.3 (24 @ 23:59)  HR: 74 (08-10-24 @ 08:37)  BP: 103/50 (08-10-24 @ 08:37) (96/42 - 120/55)  RR: 18 (08-10-24 @ 08:37)  SpO2: 98% (08-10-24 @ 08:37)          TESTS & MEASUREMENTS:  Weight/BMI  101 (24 @ 12:35)    24 @ 07:01  -  08-10-24 @ 07:00  --------------------------------------------------------  IN: 0 mL / OUT: 1500 mL / NET: -1500 mL                            7.3    6.33  )-----------( 178      ( 09 Aug 2024 07:43 )             24.4         08-    142  |  107  |  105<HH>  ----------------------------<  144<H>  5.1<H>   |  23  |  2.7<H>    Ca    8.4      09 Aug 2024 17:47  Phos  5.3     08-  Mg     2.7     08-    TPro  4.9<L>  /  Alb  2.4<L>  /  TBili  0.4  /  DBili  x   /  AST  19  /  ALT  12  /  AlkPhos  73  08    LIVER FUNCTIONS - ( 09 Aug 2024 07:43 )  Alb: 2.4 g/dL / Pro: 4.9 g/dL / ALK PHOS: 73 U/L / ALT: 12 U/L / AST: 19 U/L / GGT: x                 Urinalysis Basic - ( 09 Aug 2024 17:47 )    Color: x / Appearance: x / SG: x / pH: x  Gluc: 144 mg/dL / Ketone: x  / Bili: x / Urobili: x   Blood: x / Protein: x / Nitrite: x   Leuk Esterase: x / RBC: x / WBC x   Sq Epi: x / Non Sq Epi: x / Bacteria: x          Ferritin: 170 ng/mL (24 @ 14:29)                    RADIOLOGY, ECG, & ADDITIONAL TESTS:  12 Lead ECG:   Ventricular Rate 78 BPM    Atrial Rate 78 BPM    P-R Interval 186 ms    QRS Duration 94 ms    Q-T Interval 438 ms    QTC Calculation(Bazett) 499 ms    P Axis 70 degrees    R Axis -42 degrees    T Axis -35 degrees    Diagnosis Line Sinus rhythm withfrequent Premature ventricular complexes  Left axis deviation  Incomplete right bundle branch block  Minimal voltage criteria for LVH, may be normal variant  ST & T wave abnormality, consider inferior ischemia  Abnormal ECG    Confirmed by El Zhao ([id number]) on 2024 6:30:32 PM (24 @ 16:05)    CT Abdomen and Pelvis No Cont:   ACC: [id number] EXAM:  CT ABDOMEN AND PELVIS   ORDERED BY: JHON MCKEON     PROCEDURE DATE:  2024          INTERPRETATION:  CLINICAL STATEMENT: Anemia. Evaluate for intra-abdominal   or retroperitoneal bleed.    TECHNIQUE: Contiguous axial CT images were obtained from the lower chest   to the pubic symphysis without intravenous contrast. Oral contrast was   not administered.  Reformatted images in the coronal and sagittal planes   were acquired.    COMPARISON CT: 2023    OTHER STUDIES USED FOR CORRELATION: None.      FINDINGS:  Limited evaluation of solid organs and vascular structures secondary to   lack of intravenous contrast.    LOWER CHEST: Hypodensity of the cardiac blood pool relative to the   ventricular wall, compatible with CT evidence of anemia. Partially imaged   right atrial/ventricular catheter. Aortic valve calcifications.   Cardiomegaly.    HEPATOBILIARY: Post cholecystectomy. Unremarkable liver.    SPLEEN: Unremarkable.    PANCREAS: Unremarkable.    ADRENALGLANDS: Unchanged 1.7 cm left adrenal gland nodule.    KIDNEYS: No nephroureteral calculi or hydronephrosis. Left renal cyst.    ABDOMINOPELVIC NODES: Unchanged scattered mildly prominent gastrohepatic   ligament lymph nodes.    PELVIC ORGANS: Small calcified uterine fibroid. Unremarkable urinary   bladder.    PERITONEUM/MESENTERY/BOWEL: Stool-filled distended rectum measuring 7.5   cm in diameter. Mild rectal wall thickening and presacral edema. No bowel   obstruction, ascites or pneumoperitoneum. Colonic diverticulosis, without   evidence for acute diverticulitis. Perigastric and splenorenal varices.   No evidence of an intraperitoneal or retroperitoneal hematoma. Metallic   density within the gastric fundus.    BONES/SOFT TISSUES: Degenerative changes of the spine noted. Avascular   necrosis of the right femoral head, stable. Degenerative changes of the   spine noted. Mild soft tissue anasarca.    OTHER: Scattered atherosclerotic vascular calcifications.      IMPRESSION:  1.  No evidence of an intraperitoneal or retroperitoneal hematoma; CT   evidence of anemia as above.  2.  Moderate rectal stool burden with associated rectal wall thickening   and adjacent edema. Findings may reflect developing stercoral proctitis.  3.  Additionalincidental findings as above.    --- End of Report ---            GERALDINE DAVIS MD; Attending Radiologist  This document has been electronically signed. Aug  9 2024  2:16PM (24 @ 11:43)  CT Head No Cont:   ACC: [id number] EXAM:  CT BRAIN   ORDERED BY: ENA LIRIANO     PROCEDURE DATE:  2024          INTERPRETATION:  Clinical History / Reason for exam: Weakness and   sleepiness.    Technique: Noncontrast head CT.  Contiguous CT axial images of the head   from the base to the vertex with coronal and sagittal reformats.    Comparison/correlation: CT head 2009    Findings:    The ventricles and cortical sulci are normal in size and configuration.    There is no acute intracranial hemorrhage, extra-axial fluid collection   or midline shift.  Gray-white matter differentiation is maintained.    The visualized paranasal sinuses and mastoids are clear.    IMPRESSION:    No evidence of acute intracranial pathology.    --- End of Report ---            GILLES PETERSEN MD; Attending Radiologist  This document has been electronically signed. Aug  8 2024  3:06PM (24 @ 14:44)    RECENT DIAGNOSTIC ORDERS:  Magnesium: Repeat From: 09-Aug-2024 14:31 To: 12-Aug-2024 04:30, Every 1 day(s) (24 @ 14:32)  Comprehensive Metabolic Panel: Repeat From: 09-Aug-2024 14:31 To: 12-Aug-2024 04:30, Every 1 day(s) (24 @ 14:32)  Complete Blood Count + Automated Diff: Repeat From: 09-Aug-2024 14:31 To: 12-Aug-2024 04:30, Every 1 day(s) (24 @ 14:32)  Xray Ankle Complete 3 Views, Right: Routine   Indication: r/o fracture  Transport: Wheelchair  Exam Completed (24 @ 14:05)      MEDICATIONS:  MEDICATIONS  (STANDING):  acetaminophen     Tablet .. 1000 milliGRAM(s) Oral every 8 hours  chlorhexidine 2% Cloths 1 Application(s) Topical daily  cyanocobalamin Injectable 1000 MICROGram(s) IntraMuscular daily  epoetin raquel (EPOGEN) Injectable 96711 Unit(s) SubCutaneous every 7 days  folic acid 1 milliGRAM(s) Oral daily  gabapentin 100 milliGRAM(s) Oral daily  heparin   Injectable 5000 Unit(s) SubCutaneous every 12 hours  lactulose Syrup 20 Gram(s) Oral every 6 hours  lidocaine 1%/epinephrine 1:100,000 Inj 3 Vial(s) Local Injection once  metoprolol tartrate 12.5 milliGRAM(s) Oral every 12 hours  pantoprazole    Tablet 40 milliGRAM(s) Oral before breakfast  polyethylene glycol 3350 17 Gram(s) Oral daily  senna 2 Tablet(s) Oral at bedtime  sodium zirconium cyclosilicate 10 Gram(s) Oral daily    MEDICATIONS  (PRN):      HOME MEDICATIONS:  furosemide 40 mg oral tablet ()  metoprolol tartrate 25 mg oral tablet ()  pantoprazole 40 mg oral delayed release tablet ()      PHYSICAL EXAM:  General:  more awake today answer simple questions properly and follows simple commands like moving her UE and LE , chronic ill appearance  Lungs:  clear to ausculation b/l, normal resp effort, Port-A-Cath in place, no tender or erythema or discharge   Heart: regular ryhthm   Abdomen: soft, non tender non distended + BS   Ext: + edema, can move all  his extremities

## 2024-08-10 NOTE — DIETITIAN INITIAL EVALUATION ADULT - ENERGY INTAKE
Reports poor appetite, able to state she did not have breakfast today and was not hungry yesterday.     Varied intake per flowsheet documentation  8/4: %  8/3: 0-25%, %  8/2: 51-75%, %

## 2024-08-10 NOTE — DIETITIAN INITIAL EVALUATION ADULT - COLLABORATION WITH OTHER PROVIDERS
Intervention: meals and snacks, oral nutrition supplements, coordination of care  Monitoring/Evaluation: diet order, energy intake, weights, labs, GI s/s, BG, skin status, NFPE, GOC

## 2024-08-10 NOTE — CONSULT NOTE ADULT - SUBJECTIVE AND OBJECTIVE BOX
Gastroenterology Consultation:    Patient is a 78y old  Female who presents with a chief complaint of Pressure injury of skin     (10 Aug 2024 11:18)        Admitted on: 24      HPI:  HPI : Patient is a 78-year-old female with past medical history of   CKD, HTN, CCY, gout,  severe AS being planned for tAVR, and history of severe anemia secondary to chronic upper GI bleeding due to  AV malformation gastric body dieulafoy lesion, weekly blood transfusions presenting to ER for a rupture pressure ulcer of the buttock area.   Patient states that last night was on her toilet which cracked while she was sitting on it, and patient was unable to get off the toilet for 4 hours.  By the time EMS arrived skin had ulcerated, with a blister that ruptured predominantly to the left buttock area (appears to be like burn blister that ruptured) with linear abrasions to the bilateral upper thighs/buttocks.  Patient in so much pain that she cannot stand or walk, Of note, pt was recently admitted to the hospital between - for hematochezia, s/p 1 unit prbc. Denies fever, chills. N/V, abd pain. GI is called for evaluation of elevated ammonia level                   PAST MEDICAL & SURGICAL HISTORY:  HTN (hypertension)      Heart murmur      Eczema      Anemia  iron infusions and PRBC transfusions      Aortic stenosis      Chronic kidney disease (CKD)      2019 novel coronavirus disease (COVID-19)  2020- REHAB admission      Gastric AVM      H/O appendicitis      S/P cholecystectomy      History of esophagogastroduodenoscopy (EGD)      H/O colonoscopy      H/O  section      History of appendectomy      Port-A-Cath in place  right CW            FAMILY HISTORY:  FH: kidney disease (Father)    FH: breast cancer (Mother)    FH: multiple myeloma (Mother)        Social History:  Tobacco: denies  Alcohol: denies  Drugs: denies     Home Medications:  furosemide 40 mg oral tablet: 1 tab(s) orally once a day (2024 04:41)  metoprolol tartrate 25 mg oral tablet: 1 tab(s) orally once a day (2024 04:41)        MEDICATIONS  (STANDING):  acetaminophen     Tablet .. 1000 milliGRAM(s) Oral every 8 hours  chlorhexidine 2% Cloths 1 Application(s) Topical daily  cyanocobalamin Injectable 1000 MICROGram(s) IntraMuscular daily  epoetin raquel (EPOGEN) Injectable 86446 Unit(s) SubCutaneous every 7 days  folic acid 1 milliGRAM(s) Oral daily  gabapentin 100 milliGRAM(s) Oral daily  heparin   Injectable 5000 Unit(s) SubCutaneous every 12 hours  lactulose Syrup 20 Gram(s) Oral every 6 hours  lidocaine 1%/epinephrine 1:100,000 Inj 3 Vial(s) Local Injection once  metoprolol tartrate 12.5 milliGRAM(s) Oral every 12 hours  pantoprazole    Tablet 40 milliGRAM(s) Oral before breakfast  polyethylene glycol 3350 17 Gram(s) Oral daily  senna 2 Tablet(s) Oral at bedtime  sodium zirconium cyclosilicate 10 Gram(s) Oral daily    MEDICATIONS  (PRN):      Allergies  aspirin (Rash; Other)  penicillins (Rash; Urticaria; Hives; Blisters)  latex (Unknown)  EKG leads (Hives)      Review of Systems:   Constitutional:  No Fever, No Chills  ENT/Mouth:  No Hearing Changes,  No Difficulty Swallowing  Eyes:  No Eye Pain, No Vision Changes  Cardiovascular:  No Chest Pain, No Palpitations  Respiratory:  No Cough, No Dyspnea  Gastrointestinal:  As described in HPI  Musculoskeletal:  No Joint Swelling, No Back Pain  Skin:  No Skin Lesions, No Jaundice  Neuro:  No Syncope, No Dizziness  Heme/Lymph:  No Bruising, No Bleeding.          Physical Examination:  T(C): 35.4 (08-10-24 @ 08:37), Max: 36.3 (24 @ 23:59)  HR: 74 (08-10-24 @ 08:37) (65 - 77)  BP: 103/50 (08-10-24 @ 08:37) (96/42 - 120/55)  RR: 18 (08-10-24 @ 08:37) (18 - 18)  SpO2: 98% (08-10-24 @ 08:37) (97% - 98%)      24 @ 07:01  -  08-10-24 @ 07:00  --------------------------------------------------------  IN: 0 mL / OUT: 1500 mL / NET: -1500 mL          GENERAL: AAOx2, no acute distress.  HEAD:  Atraumatic, Normocephalic  EYES: conjunctiva and sclera clear  NECK: Supple, no JVD or thyromegaly  CHEST/LUNG: Clear to auscultation bilaterally; No wheeze, rhonchi, or rales  HEART: Regular rate and rhythm; normal S1, S2, No murmurs.  ABDOMEN: Soft, nontender, nondistended; Bowel sounds present  NEUROLOGY: No asterixis or tremor.   SKIN: Intact, no jaundice        Data:                        7.3    6.33  )-----------( 178      ( 09 Aug 2024 07:43 )             24.4     Hgb Trend:  7.3  24 @ 07:43  7.2  24 @ 06:41            142  |  107  |  105<HH>  ----------------------------<  144<H>  5.1<H>   |  23  |  2.7<H>    Ca    8.4      09 Aug 2024 17:47  Phos  5.3       Mg     2.7         TPro  4.9<L>  /  Alb  2.4<L>  /  TBili  0.4  /  DBili  x   /  AST  19  /  ALT  12  /  AlkPhos  73      Liver panel trend:  TBili 0.4   /   AST 19   /   ALT 12   /   AlkP 73   /   Tptn 4.9   /   Alb 2.4    /   DBili --      09  TBili 0.5   /   AST 15   /   ALT 12   /   AlkP 68   /   Tptn 4.3   /   Alb 2.5    /   DBili --      0808  TBili 0.3   /   AST 14   /   ALT 13   /   AlkP 68   /   Tptn 4.2   /   Alb 2.5    /   DBili --      08-07  TBili 0.4   /   AST 16   /   ALT 15   /   AlkP 71   /   Tptn 4.4   /   Alb 2.6    /   DBili --      08-06  TBili 0.7   /   AST 22   /   ALT 20   /   AlkP 64   /   Tptn 4.3   /   Alb 2.5    /   DBili --      08-03  TBili 0.5   /   AST 27   /   ALT 21   /   AlkP 64   /   Tptn 4.5   /   Alb 2.5    /   DBili --      08-02  TBili 0.8   /   AST 26   /   ALT 20   /   AlkP 62   /   Tptn 4.7   /   Alb 2.7    /   DBili --                    Radiology:    < from: CT Abdomen and Pelvis No Cont (24 @ 11:43) >    IMPRESSION:  1.  No evidence of an intraperitoneal or retroperitoneal hematoma; CT   evidence of anemia as above.  2.  Moderate rectal stool burden with associated rectal wall thickening   and adjacent edema. Findings may reflect developing stercoral proctitis.  3.  Additionalincidental findings as above.    < end of copied text >

## 2024-08-10 NOTE — DIETITIAN INITIAL EVALUATION ADULT - OTHER INFO
78-year-old female with past medical history of   CKD, HTN, CCY, gout,  severe AS being planned for tAVR, and history of severe anemia secondary to chronic upper GI bleeding due to  AV malformation gastric body dieulafoy lesion, weekly blood transfusions presenting to ER for a rupture pressure ulcer of the buttock area.   Patient states that last night was on her toilet which cracked while she was sitting on it, and patient was unable to get off the toilet for 4 hours.      # Bilateral buttocks and sacrum deep Tissue Injury  # Acute kidney inury on CKD stage 4, prerenal  # Rhabdomylosis- resolved  # Hyperkalemia  # Colon polyps  # History of perisplenic Varices in 2023

## 2024-08-10 NOTE — DIETITIAN INITIAL EVALUATION ADULT - ORAL INTAKE PTA/DIET HISTORY
Pt alert/disoriented per EMR. Pt unable to participate in RD interview at time of visit, states her "stomach is hurting" and unable to answer any questions. No family at bedside. Will re-attempt at f/u as able    No wt hx in [facility] HIE Tab on EMR.

## 2024-08-10 NOTE — DIETITIAN INITIAL EVALUATION ADULT - PERTINENT MEDS FT
MEDICATIONS  (STANDING):  acetaminophen     Tablet .. 1000 milliGRAM(s) Oral every 8 hours  chlorhexidine 2% Cloths 1 Application(s) Topical daily  cyanocobalamin Injectable 1000 MICROGram(s) IntraMuscular daily  epoetin raquel (EPOGEN) Injectable 36777 Unit(s) SubCutaneous every 7 days  folic acid 1 milliGRAM(s) Oral daily  gabapentin 100 milliGRAM(s) Oral daily  heparin   Injectable 5000 Unit(s) SubCutaneous every 12 hours  lactulose Syrup 20 Gram(s) Oral every 6 hours  lidocaine 1%/epinephrine 1:100,000 Inj 3 Vial(s) Local Injection once  metoprolol tartrate 12.5 milliGRAM(s) Oral every 12 hours  pantoprazole    Tablet 40 milliGRAM(s) Oral before breakfast  polyethylene glycol 3350 17 Gram(s) Oral daily  senna 2 Tablet(s) Oral at bedtime  sodium zirconium cyclosilicate 10 Gram(s) Oral daily    MEDICATIONS  (PRN):

## 2024-08-10 NOTE — PROGRESS NOTE ADULT - ASSESSMENT
78-year-old female with past medical history of   CKD, HTN, CCY, gout,  severe AS being planned for TAVR, and history of severe anemia secondary to chronic upper GI bleeding due to  AV malformation gastric body dieulafoy lesion, weekly blood transfusions presenting to ER for a rupture pressure ulcer of the buttock area.  #EVAN on CKD4/ anemia   - creat improved to baseline  - no hydro on renal/bladder sono   - non oliguric   - monitor bladder scan q shift   - continue lasix 40 q24h   - phos ok, monitor  - maintain renal diet  -  lokelma 10 q 24 if repeated k > 5.5  - completed course of venofer / start epo (retacrit) 10K units subq wkly  no indication for RRT   repeat BMP  will follow

## 2024-08-10 NOTE — PROGRESS NOTE ADULT - ASSESSMENT
78-year-old female with past medical history of   CKD, HTN, CCY, gout,  severe AS being planned for tAVR, and history of severe anemia secondary to chronic upper GI bleeding due to  AV malformation gastric body dieulafoy lesion, weekly blood transfusions presenting to ER for a rupture pressure ulcer of the buttock area.   Patient states that last night was on her toilet which cracked while she was sitting on it, and patient was unable to get off the toilet for 4 hours.  By the time EMS arrived skin had ulcerated, with a blister that ruptured predominantly to the left buttock area (appears to be like burn blister that ruptured) with linear abrasions to the bilateral upper thighs/buttocks.  Patient in so much pain that she cannot stand or walk,    # Bilateral buttocks and sacrum deep Tissue Injury  wound team input appreciated (Bilateral buttocks and sacrum region (mimicking toilet seat) )   ID inpout appreciated , hold antibiotics -  wounds without any gross signs of infection    - Cleanse wound to bilateral buttocks and sacrum with Vashe wound cleanser. Pat dry, apply Silvadene, cover with Xeroform and abdominal pad twice a day and prn for soiling.   frequent turning, off loading,and positioning and skin care as per protocol, Maintain pressure injury prevention, Keep skin clean, Offload heels, Monitor wound for changes and notify provider if any   nutrition support   IV tylenol given   Burn team consult-plan for  bedside debridement of necrotic tissue    # Acute kidney inury on CKD stage 4, prerenal  # Rhabdomylosis- resolved  # Hyperkalemia  - Creatine Kinase normalized   - US Kidney and Bladder (07.30.24 @ 07:49) >No definite hydronephrosis  - S/p IV fluids  nephro on board - recc appreciated    - monitor UO  - monitor BMP  - low k diet  - lokelma 10 q 24 if repeated k > 5.5 as per nephro - given  today   - monitor k  Monitor phosphoros   Creatinine Trend: 2.7<--, 2.6<--, 2.7<--, 2.8<--, 2.6<--, 2.5<--   , lasix on hold     [] Pt noted sleepy from 8/6 as per nursing staff   ABG wnl / TSH 4.36  / CT HEAD negative for acute  AND   Neurology consult appreciated - ammonia level is elevated 206   laculose started overnight as per neuro reccs - discussed with the patient son, will contiue for now as at trial , moving her bowel   CXR noted  Neurochecs   no fever no leukocytosis   CTAP done and reported-  No evidence of an intraperitoneal or retroperitoneal hematoma; CT evidence of anemia as above.  Moderate rectal stool burden with associated rectal wall thickening and adjacent edema. Findings may reflect developing stercoral proctitis.  geive bowel regemin and consult GI       # Peripheral neuropathy  could be secondary to B12 deficiency   -  neurontin- resumed   IV tyleonol as needed   - c/w B12, folate supplement   - evaluated by PT : STR  X RAY R ankle for reported pain - pending report     # Acute on chronic  anemia  # Non erosive gastritis  # Severe diverticulosis  # Grade/Stage I external hemorrhoids  # H/o  AVMs  # Colon polyps  # History of perisplenic Varices in 2023   b12 and folate deficiency- c/w supplement    -  EGD-Colonoscopy (07.22.24 @ 12:30) >Erythema in the stomach compatible with non-erosive gastritis. 2 non-bleeding 7mm AVMs were seen at the second portion of the duodenum cauterization was successfully applied to ensurehemostasis. Polyps (5 mm) in the ascending colon. (Polypectomy). Polyps (10 mm) in the ascending colon. (Polypectomy). Polyp (10 mm) in the ascending colon. (Polypectomy, Endoclip). 3 non-bleeding AVMs ranging in size between 7-10mm were seen in the cecum.Using APC probe, cauterization was successfully performed to ensure hemostasis. 3 non-bleeding AVMs ranging in size between 7-105 mm were seen in theascending colon. Using APC probe, cauterization was successfully performed to ensure hemostasis. Severe diverticulosis of the the left side of the colon. Grade/Stage I external hemorrhoids.  - c/w protonix  - s/p 1U pRBC on 8/2   completed course  IV Venofer - f/u with nephro for  WOODY - start WOODY   c/wb12 and folate replacement for vit b12 and folate deficiency  op hematology and GI    keep type and screen active     # Chronic diastolic CHF  # Severe aortic stenosis   -  TTE Echo Complete w/o Contrast w/ Doppler (08.01.24 @ 11:03) >EF of 56 %. Mild asymmetric left ventricular hypertrophy. Grade II diastolic dysfunction). Severe aortic valve stenosis , Mild to moderate mitral valve regurgitation. Moderate aortic regurgitation.  - Patient to follow up with Dr. Hernandez as an outpatient for TAVR  - c/w metoprolol, lasix    # Thrombocytopenia- resolved    # DVT prophylaxis- HEPARIN SQ-  LE duplex  negative     GOC : DNR/ DNI with NIV  palliative team consult - Appreciated   I discussed today with the patient son Mr. Acuña and updated him , and when I told him that i spoke with his brother Mr. Keith yesterday   he stated that he is the HCP and that his brother Tello Reeves is not involved in his mother care and that he should not be updated     # Pending: monitor mental status, GI , NEPHRO neurology , burn team,  Bowel movement   BMP, CBC ,   # Disposition: STR- PT eval

## 2024-08-10 NOTE — DIETITIAN INITIAL EVALUATION ADULT - NS FNS DIET ORDER
Diet, DASH/TLC:   Sodium & Cholesterol Restricted  For patients receiving Renal Replacement - No Protein Restr, No Conc K, No Conc Phos, Low  Sodium (RENAL) (08-06-24 @ 13:11) [Active]

## 2024-08-10 NOTE — DIETITIAN INITIAL EVALUATION ADULT - ADD RECOMMEND
1. Continue with current diet order  2. ADD Silvino 2x/day to provide: 90 kcal/2.5 gm protein per serving to aide with wound healing  If medically feasible, can add other oral nutrition supplements and remove diet modifiers to encourage PO  3. Encourage PO intake and assist with meal times prn     Pt at high risk f/u in 3-5 days or prn

## 2024-08-10 NOTE — DIETITIAN INITIAL EVALUATION ADULT - PERTINENT LABORATORY DATA
08-09    142  |  107  |  105<HH>  ----------------------------<  144<H>  5.1<H>   |  23  |  2.7<H>    Ca    8.4      09 Aug 2024 17:47  Phos  5.3     08-09  Mg     2.7     08-09    TPro  4.9<L>  /  Alb  2.4<L>  /  TBili  0.4  /  DBili  x   /  AST  19  /  ALT  12  /  AlkPhos  73  08-09

## 2024-08-10 NOTE — PROGRESS NOTE ADULT - SUBJECTIVE AND OBJECTIVE BOX
Nephrology progress note    THIS IS AN INCOMPLETE NOTE . FULL NOTE TO FOLLOW SHORTLY    Patient is seen and examined, events over the last 24 h noted .    Allergies:  aspirin (Rash; Other)  penicillins (Rash; Urticaria; Hives; Blisters)  latex (Unknown)  EKG leads (Hives)    Hospital Medications:   MEDICATIONS  (STANDING):  acetaminophen     Tablet .. 1000 milliGRAM(s) Oral every 8 hours  chlorhexidine 2% Cloths 1 Application(s) Topical daily  cyanocobalamin Injectable 1000 MICROGram(s) IntraMuscular daily  epoetin raquel (EPOGEN) Injectable 42667 Unit(s) SubCutaneous every 7 days  folic acid 1 milliGRAM(s) Oral daily  gabapentin 100 milliGRAM(s) Oral daily  heparin   Injectable 5000 Unit(s) SubCutaneous every 12 hours  lactulose Syrup 20 Gram(s) Oral every 6 hours  lidocaine 1%/epinephrine 1:100,000 Inj 3 Vial(s) Local Injection once  metoprolol tartrate 12.5 milliGRAM(s) Oral every 12 hours  pantoprazole    Tablet 40 milliGRAM(s) Oral before breakfast  polyethylene glycol 3350 17 Gram(s) Oral daily  senna 2 Tablet(s) Oral at bedtime  sodium zirconium cyclosilicate 10 Gram(s) Oral daily        VITALS:  T(F): 97.3 (08-09-24 @ 23:59), Max: 97.3 (08-09-24 @ 23:59)  HR: 77 (08-10-24 @ 05:29)  BP: 106/62 (08-10-24 @ 05:29)  RR: 18 (08-09-24 @ 23:59)  SpO2: 97% (08-09-24 @ 23:59)  Wt(kg): --    08-09 @ 07:01  -  08-10 @ 07:00  --------------------------------------------------------  IN: 0 mL / OUT: 1500 mL / NET: -1500 mL          PHYSICAL EXAM:  Constitutional: NAD  HEENT: anicteric sclera, oropharynx clear, MMM  Neck: No JVD  Respiratory: CTAB, no wheezes, rales or rhonchi  Cardiovascular: S1, S2, RRR  Gastrointestinal: BS+, soft, NT/ND  Extremities: No cyanosis or clubbing. No peripheral edema  :  No culver.   Skin: No rashes    LABS:  08-09    142  |  107  |  105<HH>  ----------------------------<  144<H>  5.1<H>   |  23  |  2.7<H>    Ca    8.4      09 Aug 2024 17:47  Phos  5.3     08-09  Mg     2.7     08-09    TPro  4.9<L>  /  Alb  2.4<L>  /  TBili  0.4  /  DBili      /  AST  19  /  ALT  12  /  AlkPhos  73  08-09                          7.3    6.33  )-----------( 178      ( 09 Aug 2024 07:43 )             24.4       Urine Studies:  Urinalysis Basic - ( 09 Aug 2024 17:47 )    Color:  / Appearance:  / SG:  / pH:   Gluc: 144 mg/dL / Ketone:   / Bili:  / Urobili:    Blood:  / Protein:  / Nitrite:    Leuk Esterase:  / RBC:  / WBC    Sq Epi:  / Non Sq Epi:  / Bacteria:           Iron 23, TIBC 163, %sat 14      [08-01-24 @ 14:29]  Ferritin 170      [08-01-24 @ 14:29]  TSH 4.36      [08-08-24 @ 09:40]      Free Light Chains: kappa 11.98, lambda 7.77, ratio = 1.54      [01-19 @ 05:43]  Immunofixation Serum:   Weak IgG Lambda Band Identified    Reference Range: None Detected      [01-19-22 @ 05:43]  SPEP Interpretation: Weak Gamma-Migrating Paraprotein Identified      [01-19-22 @ 05:43]      RADIOLOGY & ADDITIONAL STUDIES:   Nephrology progress note  Patient is seen and examined, events over the last 24 h noted .  in bed comfortable     Allergies:  aspirin (Rash; Other)  penicillins (Rash; Urticaria; Hives; Blisters)  latex (Unknown)  EKG leads (Hives)    Hospital Medications:   MEDICATIONS  (STANDING):    acetaminophen     Tablet .. 1000 milliGRAM(s) Oral every 8 hours  cyanocobalamin Injectable 1000 MICROGram(s) IntraMuscular daily  epoetin raquel (EPOGEN) Injectable 56119 Unit(s) SubCutaneous every 7 days  folic acid 1 milliGRAM(s) Oral daily  gabapentin 100 milliGRAM(s) Oral daily  heparin   Injectable 5000 Unit(s) SubCutaneous every 12 hours  lactulose Syrup 20 Gram(s) Oral every 6 hours  lidocaine 1%/epinephrine 1:100,000 Inj 3 Vial(s) Local Injection once  metoprolol tartrate 12.5 milliGRAM(s) Oral every 12 hours  pantoprazole    Tablet 40 milliGRAM(s) Oral before breakfast  polyethylene glycol 3350 17 Gram(s) Oral daily  senna 2 Tablet(s) Oral at bedtime  sodium zirconium cyclosilicate 10 Gram(s) Oral daily        VITALS:  T(F): 97.3 (08-09-24 @ 23:59), Max: 97.3 (08-09-24 @ 23:59)  HR: 77 (08-10-24 @ 05:29)  BP: 106/62 (08-10-24 @ 05:29)  RR: 18 (08-09-24 @ 23:59)  SpO2: 97% (08-09-24 @ 23:59)      08-09 @ 07:01  -  08-10 @ 07:00  --------------------------------------------------------  IN: 0 mL / OUT: 1500 mL / NET: -1500 mL          PHYSICAL EXAM:  Constitutional: NAD  Respiratory: CTAB, no wheezes, rales or rhonchi/ lacho cath   Cardiovascular: S1, S2, RRR  Gastrointestinal: BS+, soft, NT/ND  Extremities: No cyanosis or clubbing. No peripheral edema  :  No culver.   Skin: No rashes    LABS:      08-09    142  |  107  |  105<HH>  ----------------------------<  144<H>  5.1<H>   |  23  |  2.7<H>    Ca    8.4      09 Aug 2024 17:47  Phos  5.3     08-09  Mg     2.7     08-09    TPro  4.9<L>  /  Alb  2.4<L>  /  TBili  0.4  /  DBili      /  AST  19  /  ALT  12  /  AlkPhos  73  08-09                          7.3    6.33  )-----------( 178      ( 09 Aug 2024 07:43 )             24.4       Urine Studies:  Urinalysis Basic - ( 09 Aug 2024 17:47 )    Color:  / Appearance:  / SG:  / pH:   Gluc: 144 mg/dL / Ketone:   / Bili:  / Urobili:    Blood:  / Protein:  / Nitrite:    Leuk Esterase:  / RBC:  / WBC    Sq Epi:  / Non Sq Epi:  / Bacteria:           Iron 23, TIBC 163, %sat 14      [08-01-24 @ 14:29]  Ferritin 170      [08-01-24 @ 14:29]  TSH 4.36      [08-08-24 @ 09:40]      Free Light Chains: kappa 11.98, lambda 7.77, ratio = 1.54      [01-19 @ 05:43]  Immunofixation Serum:   Weak IgG Lambda Band Identified    Reference Range: None Detected      [01-19-22 @ 05:43]  SPEP Interpretation: Weak Gamma-Migrating Paraprotein Identified      [01-19-22 @ 05:43]      RADIOLOGY & ADDITIONAL STUDIES:

## 2024-08-10 NOTE — CONSULT NOTE ADULT - ASSESSMENT
Elevated ammonia:  Elevated ammonia is nonspecific especially in noncirrhotic patient  Workup of metabolic encephalopathy per primary team    Moderate stool burden  Avoid lactulose as needed for worse abdominal distention  MiraLAX 3 times daily  Senna 2 tab  Tapwater enemas alternating with mineral oil Enemas  May need manual disimpaction per primary team    History of AVMs (gastric and duodenal) and Dieulafoy Lesions Status Post Hemostasis in 2023  - on weekly IV transfusion/iron infusions per hematology  - s/p EGD/colonoscopy/push enteroscopy on 7/22 showing AVMs and polyps    - monitor h/h  - Monitor BM  - Patient will eventually need repair of severe aortic stenosis as recurrence risk of AVMs are high in these patients    Recall PRN

## 2024-08-11 LAB
ALBUMIN SERPL ELPH-MCNC: 2.6 G/DL — LOW (ref 3.5–5.2)
ALP SERPL-CCNC: 93 U/L — SIGNIFICANT CHANGE UP (ref 30–115)
ALT FLD-CCNC: 24 U/L — SIGNIFICANT CHANGE UP (ref 0–41)
AMMONIA BLD-MCNC: 266 UMOL/L — CRITICAL HIGH (ref 11–55)
ANION GAP SERPL CALC-SCNC: 12 MMOL/L — SIGNIFICANT CHANGE UP (ref 7–14)
ANION GAP SERPL CALC-SCNC: 13 MMOL/L — SIGNIFICANT CHANGE UP (ref 7–14)
APPEARANCE UR: ABNORMAL
APTT BLD: 31.3 SEC — SIGNIFICANT CHANGE UP (ref 27–39.2)
AST SERPL-CCNC: 36 U/L — SIGNIFICANT CHANGE UP (ref 0–41)
BASE EXCESS BLDA CALC-SCNC: 1.1 MMOL/L — SIGNIFICANT CHANGE UP (ref -2–3)
BASOPHILS # BLD AUTO: 0.01 K/UL — SIGNIFICANT CHANGE UP (ref 0–0.2)
BASOPHILS NFR BLD AUTO: 0.2 % — SIGNIFICANT CHANGE UP (ref 0–1)
BILIRUB SERPL-MCNC: 0.7 MG/DL — SIGNIFICANT CHANGE UP (ref 0.2–1.2)
BILIRUB UR-MCNC: NEGATIVE — SIGNIFICANT CHANGE UP
BLD GP AB SCN SERPL QL: SIGNIFICANT CHANGE UP
BUN SERPL-MCNC: 124 MG/DL — CRITICAL HIGH (ref 10–20)
BUN SERPL-MCNC: 93 MG/DL — CRITICAL HIGH (ref 10–20)
CALCIUM SERPL-MCNC: 7.9 MG/DL — LOW (ref 8.4–10.5)
CALCIUM SERPL-MCNC: 8.1 MG/DL — LOW (ref 8.4–10.5)
CHLORIDE SERPL-SCNC: 109 MMOL/L — SIGNIFICANT CHANGE UP (ref 98–110)
CHLORIDE SERPL-SCNC: 109 MMOL/L — SIGNIFICANT CHANGE UP (ref 98–110)
CK MB CFR SERPL CALC: 3.8 NG/ML — SIGNIFICANT CHANGE UP (ref 0.6–6.3)
CK SERPL-CCNC: 56 U/L — SIGNIFICANT CHANGE UP (ref 0–225)
CO2 SERPL-SCNC: 22 MMOL/L — SIGNIFICANT CHANGE UP (ref 17–32)
CO2 SERPL-SCNC: 23 MMOL/L — SIGNIFICANT CHANGE UP (ref 17–32)
COLOR SPEC: YELLOW — SIGNIFICANT CHANGE UP
CREAT SERPL-MCNC: 2.4 MG/DL — HIGH (ref 0.7–1.5)
CREAT SERPL-MCNC: 2.8 MG/DL — HIGH (ref 0.7–1.5)
DIFF PNL FLD: NEGATIVE — SIGNIFICANT CHANGE UP
EGFR: 17 ML/MIN/1.73M2 — LOW
EGFR: 20 ML/MIN/1.73M2 — LOW
EOSINOPHIL # BLD AUTO: 0.21 K/UL — SIGNIFICANT CHANGE UP (ref 0–0.7)
EOSINOPHIL NFR BLD AUTO: 3.6 % — SIGNIFICANT CHANGE UP (ref 0–8)
GLUCOSE BLDC GLUCOMTR-MCNC: 115 MG/DL — HIGH (ref 70–99)
GLUCOSE SERPL-MCNC: 82 MG/DL — SIGNIFICANT CHANGE UP (ref 70–99)
GLUCOSE SERPL-MCNC: 98 MG/DL — SIGNIFICANT CHANGE UP (ref 70–99)
GLUCOSE UR QL: NEGATIVE MG/DL — SIGNIFICANT CHANGE UP
HCO3 BLDA-SCNC: 25 MMOL/L — SIGNIFICANT CHANGE UP (ref 21–28)
HCT VFR BLD CALC: 23.1 % — LOW (ref 37–47)
HCT VFR BLD CALC: 27.4 % — LOW (ref 37–47)
HGB BLD-MCNC: 6.8 G/DL — CRITICAL LOW (ref 12–16)
HGB BLD-MCNC: 8.4 G/DL — LOW (ref 12–16)
HOROWITZ INDEX BLDA+IHG-RTO: 21 — SIGNIFICANT CHANGE UP
IMM GRANULOCYTES NFR BLD AUTO: 0.3 % — SIGNIFICANT CHANGE UP (ref 0.1–0.3)
INR BLD: 1.05 RATIO — SIGNIFICANT CHANGE UP (ref 0.65–1.3)
KETONES UR-MCNC: NEGATIVE MG/DL — SIGNIFICANT CHANGE UP
LACTATE SERPL-SCNC: 1.4 MMOL/L — SIGNIFICANT CHANGE UP (ref 0.7–2)
LEUKOCYTE ESTERASE UR-ACNC: NEGATIVE — SIGNIFICANT CHANGE UP
LYMPHOCYTES # BLD AUTO: 0.48 K/UL — LOW (ref 1.2–3.4)
LYMPHOCYTES # BLD AUTO: 8.2 % — LOW (ref 20.5–51.1)
MAGNESIUM SERPL-MCNC: 2.9 MG/DL — HIGH (ref 1.8–2.4)
MCHC RBC-ENTMCNC: 29.4 G/DL — LOW (ref 32–37)
MCHC RBC-ENTMCNC: 29.4 PG — SIGNIFICANT CHANGE UP (ref 27–31)
MCHC RBC-ENTMCNC: 30.4 PG — SIGNIFICANT CHANGE UP (ref 27–31)
MCHC RBC-ENTMCNC: 30.7 G/DL — LOW (ref 32–37)
MCV RBC AUTO: 100 FL — HIGH (ref 81–99)
MCV RBC AUTO: 99.3 FL — HIGH (ref 81–99)
MONOCYTES # BLD AUTO: 0.36 K/UL — SIGNIFICANT CHANGE UP (ref 0.1–0.6)
MONOCYTES NFR BLD AUTO: 6.2 % — SIGNIFICANT CHANGE UP (ref 1.7–9.3)
NEUTROPHILS # BLD AUTO: 4.74 K/UL — SIGNIFICANT CHANGE UP (ref 1.4–6.5)
NEUTROPHILS NFR BLD AUTO: 81.5 % — HIGH (ref 42.2–75.2)
NITRITE UR-MCNC: NEGATIVE — SIGNIFICANT CHANGE UP
NRBC # BLD: 0 /100 WBCS — SIGNIFICANT CHANGE UP (ref 0–0)
NRBC # BLD: 0 /100 WBCS — SIGNIFICANT CHANGE UP (ref 0–0)
PCO2 BLDA: 37 MMHG — SIGNIFICANT CHANGE UP (ref 32–45)
PH BLDA: 7.44 — SIGNIFICANT CHANGE UP (ref 7.35–7.45)
PH UR: 5.5 — SIGNIFICANT CHANGE UP (ref 5–8)
PHOSPHATE SERPL-MCNC: 5.6 MG/DL — HIGH (ref 2.1–4.9)
PLATELET # BLD AUTO: 182 K/UL — SIGNIFICANT CHANGE UP (ref 130–400)
PLATELET # BLD AUTO: 191 K/UL — SIGNIFICANT CHANGE UP (ref 130–400)
PMV BLD: 10.2 FL — SIGNIFICANT CHANGE UP (ref 7.4–10.4)
PMV BLD: 10.7 FL — HIGH (ref 7.4–10.4)
PO2 BLDA: 86 MMHG — SIGNIFICANT CHANGE UP (ref 83–108)
POTASSIUM SERPL-MCNC: 5.6 MMOL/L — HIGH (ref 3.5–5)
POTASSIUM SERPL-MCNC: 6.2 MMOL/L — CRITICAL HIGH (ref 3.5–5)
POTASSIUM SERPL-SCNC: 5.6 MMOL/L — HIGH (ref 3.5–5)
POTASSIUM SERPL-SCNC: 6.2 MMOL/L — CRITICAL HIGH (ref 3.5–5)
PROT SERPL-MCNC: 4.6 G/DL — LOW (ref 6–8)
PROT UR-MCNC: NEGATIVE MG/DL — SIGNIFICANT CHANGE UP
PROTHROM AB SERPL-ACNC: 12 SEC — SIGNIFICANT CHANGE UP (ref 9.95–12.87)
RBC # BLD: 2.31 M/UL — LOW (ref 4.2–5.4)
RBC # BLD: 2.76 M/UL — LOW (ref 4.2–5.4)
RBC # FLD: 19.8 % — HIGH (ref 11.5–14.5)
RBC # FLD: 20.5 % — HIGH (ref 11.5–14.5)
SAO2 % BLDA: 97.8 % — SIGNIFICANT CHANGE UP (ref 94–98)
SODIUM SERPL-SCNC: 144 MMOL/L — SIGNIFICANT CHANGE UP (ref 135–146)
SODIUM SERPL-SCNC: 144 MMOL/L — SIGNIFICANT CHANGE UP (ref 135–146)
SP GR SPEC: 1.02 — SIGNIFICANT CHANGE UP (ref 1–1.03)
TROPONIN T, HIGH SENSITIVITY RESULT: 108 NG/L — CRITICAL HIGH (ref 6–13)
TROPONIN T, HIGH SENSITIVITY RESULT: 142 NG/L — CRITICAL HIGH (ref 6–13)
UROBILINOGEN FLD QL: 0.2 MG/DL — SIGNIFICANT CHANGE UP (ref 0.2–1)
WBC # BLD: 5.82 K/UL — SIGNIFICANT CHANGE UP (ref 4.8–10.8)
WBC # BLD: 6.55 K/UL — SIGNIFICANT CHANGE UP (ref 4.8–10.8)
WBC # FLD AUTO: 5.82 K/UL — SIGNIFICANT CHANGE UP (ref 4.8–10.8)
WBC # FLD AUTO: 6.55 K/UL — SIGNIFICANT CHANGE UP (ref 4.8–10.8)

## 2024-08-11 PROCEDURE — 71045 X-RAY EXAM CHEST 1 VIEW: CPT | Mod: 26

## 2024-08-11 PROCEDURE — 93010 ELECTROCARDIOGRAM REPORT: CPT

## 2024-08-11 PROCEDURE — 0042T: CPT

## 2024-08-11 PROCEDURE — 70496 CT ANGIOGRAPHY HEAD: CPT | Mod: 26

## 2024-08-11 PROCEDURE — 70450 CT HEAD/BRAIN W/O DYE: CPT | Mod: 26,59

## 2024-08-11 PROCEDURE — 70498 CT ANGIOGRAPHY NECK: CPT | Mod: 26

## 2024-08-11 PROCEDURE — 99418 PROLNG IP/OBS E/M EA 15 MIN: CPT

## 2024-08-11 PROCEDURE — 99233 SBSQ HOSP IP/OBS HIGH 50: CPT

## 2024-08-11 RX ORDER — LIDOCAINE HCL/EPINEPHRINE 2%-1:50000
2 SYRINGE (ML) INJECTION ONCE
Refills: 0 | Status: DISCONTINUED | OUTPATIENT
Start: 2024-08-11 | End: 2024-08-23

## 2024-08-11 RX ADMIN — CHLORHEXIDINE GLUCONATE 1 APPLICATION(S): 40 SOLUTION TOPICAL at 11:48

## 2024-08-11 RX ADMIN — Medication 5000 UNIT(S): at 05:38

## 2024-08-11 NOTE — PROCEDURE NOTE - NSTIMEOUT_GEN_A_CORE
Is This A New Presentation, Or A Follow-Up?: Growths Patient's first and last name, , procedure, and correct site confirmed prior to the start of procedure. How Severe Is Your Skin Lesion?: moderate Have Your Skin Lesions Been Treated?: not been treated Additional History: Patient states she’s here for some areas of concerns on his rt leg and LT arms.

## 2024-08-11 NOTE — CHART NOTE - NSCHARTNOTEFT_GEN_A_CORE
Transfer from  to [facility]     78-year-old female with past medical history of CKD, HTN, CCY, gout,  severe AS being planned for tavr and history of severe anemia secondary to chronic upper GI bleeding due to  AV malformation gastric body dieulafoy lesion, weekly blood transfusions presenting to ER for a rupture pressure ulcer of the buttock area after sitting on toilet, which cracked, for 4 hours. By the time EMS arrived skin had ulcerated, with a blister that ruptured predominantly to the left buttock area (appears to be like burn blister that ruptured) with linear abrasions to the bilateral upper thighs/buttocks.     RRT/ Code stroke called earlier today (8/11) as pt was altered and unresponsive. Vitals were stable, maintained air way, labs, imaging reviewed and discussed with the patient son and sister over the phone (by attending). Family agreed to go with the CTA head and neck despite the kidney function after explaining the risks of contrast. Will try for HD given rising of her BUN and ammonia - nephro agreed to start HD. Vascular consulted to place [place] STAT.     #AMS/sleepy from 8/6 as per nursing staff   - ABG wnl / TSH 4.36 / CT HEAD negative for acute    - Neurology consult appreciated - ammonia level is elevated 206   - s/p lactulose  - no fever no leukocytosis   - CTAP: No evidence of an intraperitoneal or retroperitoneal hematoma; CT evidence of anemia as above. Moderate rectal stool burden with associated rectal wall thickening and adjacent edema. Findings may reflect developing stercoral proctitis.  - 8/11 s/p RRT and stroke code - details as mentioned above   - CT PERFUSION: No perfusion deficits to suggest areas of completed infarction or at risk territory.   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 No large vessel occlusion  or vascular malformation.   - Vascular consulted for [place] , nephro to start HD TODAY   - NPO for now   - place NGT if Patient remained altered after HD IF ok with family   - hgb 6.8 - give one unite pRBCs (pt has port and gets weekly transfusions)   - trend trop , check labs and ammonia after HD today   - f/u cultures, place [facility] and get urine culture as well   - Neuro checks     # Bilateral buttocks and sacrum deep tissue Injury  - wound team input appreciated (Bilateral buttocks and sacrum region (mimicking toilet seat) )   - ID input appreciated , hold antibiotics -  wounds without any gross signs of infection    - Cleanse wound to bilateral buttocks and sacrum with Vashe wound cleanser. Pat dry, apply Silvadene, cover with Xeroform and abdominal pad twice a day and prn for soiling.   - nutrition support   - IV Tylenol given   - Burn team consult-plan for bedside debridement of necrotic tissue    # Acute kidney inury on CKD stage 4, prerenal  # Rhabdomyolysis- resolved  # Hyperkalemia   - Creatine Kinase normalized   - US Kidney and Bladder 07.30.24  >No definite hydronephrosis  - S/p IV fluids  - nephro following   - monitor UO  - monitor BMP  - low k diet  - lokelma 10 q 24 if repeated k > 5.5 as per nephro - given today   - monitor k  - Monitor phosphoros   - Creatinine Trend: 2.7<--, 2.6<--, 2.7<--, 2.8<--, 2.6<--, 2.5    # Peripheral neuropathy  - could be secondary to B12 deficiency   - IV Tylenol as needed   - c/w B12, folate supplement   - evaluated by PT : STR  - X RAY R ankle - Bony demineralization without acute fracture or malalignment.    # Acute on chronic anemia  # Non erosive gastritis  # Severe diverticulosis  # Grade/Stage I external hemorrhoids  # H/o  AVMs  # Colon polyps  # History of perisplenic Varices in 2023   - b12 and folate deficiency- c/w supplement    -  EGD-Colonoscopy (07.22.24) >Erythema in the stomach compatible with non-erosive gastritis. 2 non-bleeding 7mm AVMs were seen at the second portion of the duodenum cauterization was successfully applied to ensurehemostasis. Polyps (5 mm) in the ascending colon. (Polypectomy). Polyps (10 mm) in the ascending colon. (Polypectomy). Polyp (10 mm) in the ascending colon. (Polypectomy, Endoclip). 3 non-bleeding AVMs ranging in size between 7-10mm were seen in the cecum.Using APC probe, cauterization was successfully performed to ensure hemostasis. 3 non-bleeding AVMs ranging in size between 7-105 mm were seen in the ascending colon. Using APC probe, cauterization was successfully performed to ensure hemostasis. Severe diverticulosis of the the left side of the colon. Grade/Stage I external hemorrhoids.  - c/w protonix  - s/p 1U pRBC on 8/2   - completed course  IV Venofer - f/u with nephro for  WOODY - start WOODY   - c/w b12 and folate replacement for vit b12 and folate deficiency  - op hematology and GI    - keep type and screen active     # Chronic diastolic CHF  # Severe aortic stenosis   -  TTE Echo Complete w/o Contrast w/ Doppler (08.01.24) >EF of 56 %. Mild asymmetric left ventricular hypertrophy. Grade II diastolic dysfunction). Severe aortic valve stenosis , Mild to moderate mitral valve regurgitation. Moderate aortic regurgitation.  - Patient to follow up with Dr. Hernandez as an outpatient for TAVR  - c/w metoprolol, lasix    GOC : DNR/ DNI with NIV  palliative team consult - attending confirmed 8/11 from the patient's son Mr. Acuña that she is DNR/DNI     DVT ppx: heparin   GI ppx: protoxin   Diet: NPO   Code: DNI/DNR   Pending: monitor mental status, GI, nephro, neuro follow ups, burn team, bowel movement, repeat labs

## 2024-08-11 NOTE — PROGRESS NOTE ADULT - NS ATTEST RISK GEN_ALL_CORE
Risk Statement (NON-critical care) Intermediate Repair And Flap Additional Text (Will Appearing After The Standard Complex Repair Text): The intermediate repair was not sufficient to completely close the primary defect. The remaining additional defect was repaired with the flap mentioned below.

## 2024-08-11 NOTE — RAPID RESPONSE TEAM SUMMARY - NSMEDICATIONSRRT_GEN_ALL_CORE
HOLD gabapentin   Continue  Lactulose   Stoke CODE Called  CT Head  Upgrade to SDU  ICU consult   Hold all sedatives  Called family to Update unable to reach  Spoke with Dr Millan  from day team - sing out given  f/u Stat labs   Correct electrolytes (Target Na = 135-145 | Mg = >2.2 | K = 3.5-5)    HOLD gabapentin   Continue  Lactulose   Stoke CODE Called  Recheck Ammonia Levels   CT Head  Upgrade to SDU  ICU consult   Hold all sedatives  Called family to Update unable to reach  Spoke with Dr Millan  from day team - sing out given  f/u Stat labs   Correct electrolytes (Target Na = 135-145 | Mg = >2.2 | K = 3.5-5)

## 2024-08-11 NOTE — PROGRESS NOTE ADULT - ASSESSMENT
78-year-old female with past medical history of   CKD, HTN, CCY, gout,  severe AS being planned for tAVR, and history of severe anemia secondary to chronic upper GI bleeding due to  AV malformation gastric body dieulafoy lesion, weekly blood transfusions presenting to ER for a rupture pressure ulcer of the buttock area.   Patient states that last night was on her toilet which cracked while she was sitting on it, and patient was unable to get off the toilet for 4 hours.  By the time EMS arrived skin had ulcerated, with a blister that ruptured predominantly to the left buttock area (appears to be like burn blister that ruptured) with linear abrasions to the bilateral upper thighs/buttocks.  Patient in so much pain that she cannot stand or walk,    # Bilateral buttocks and sacrum deep Tissue Injury  wound team input appreciated (Bilateral buttocks and sacrum region (mimicking toilet seat) )   ID inpout appreciated , hold antibiotics -  wounds without any gross signs of infection    - Cleanse wound to bilateral buttocks and sacrum with Vashe wound cleanser. Pat dry, apply Silvadene, cover with Xeroform and abdominal pad twice a day and prn for soiling.   frequent turning, off loading,and positioning and skin care as per protocol, Maintain pressure injury prevention, Keep skin clean, Offload heels, Monitor wound for changes and notify provider if any   nutrition support   IV tylenol given   Burn team consult-plan for  bedside debridement of necrotic tissue    # Acute kidney inury on CKD stage 4, prerenal  # Rhabdomylosis- resolved  # Hyperkalemia  - Creatine Kinase normalized   - US Kidney and Bladder (07.30.24 @ 07:49) >No definite hydronephrosis  - S/p IV fluids  nephro on board - recc appreciated    - monitor UO  - monitor BMP  - low k diet  - lokelma 10 q 24 if repeated k > 5.5 as per nephro - given  today   - monitor k  Monitor phosphoros   Creatinine Trend: 2.7<--, 2.6<--, 2.7<--, 2.8<--, 2.6<--, 2.5<--  see below for details of HD     [] Pt noted sleepy from 8/6 as per nursing staff   ABG wnl / TSH 4.36  / CT HEAD negative for acute  AND   Neurology consult appreciated - ammonia level is elevated 206   laculose started overnight as per neuro reccs - discussed with the patient son, will contiue for now as at trial , moving her bowel   CXR noted  Neurochecs   no fever no leukocytosis   CTAP done and reported-  No evidence of an intraperitoneal or retroperitoneal hematoma; CT evidence of anemia as above.  Moderate rectal stool burden with associated rectal wall thickening and adjacent edema. Findings may reflect developing stercoral proctitis.  geive bowel regemin and consult GI noted , pending final reccs     -8/11 s/p RRT and stroke code - details as mentioned above   CT PERFUSION: No perfusion deficits to suggest areas of completed infarction or at risk   territory.   CTA HEAD/NECK:   2 mm outpouching/aneurysm within the cavernous portion of the right ICA.  No large vessel occlusion  or vascular malformation.     Vascular consulted for udall , nephro to start HD TODAY   NPO for now   place NGT iF Patient remained altered after HD IF ok with family   hgb 6.8 - give one unite pRBCs    trend trop , check labs and ammonia after HD today   Pt is being upgraded to step down - pending bed availability  frequent  neurocheck         # Peripheral neuropathy  could be secondary to B12 deficiency   -  neurontin-hold   IV tyleonol as needed   - c/w B12, folate supplement   - evaluated by PT : STR  X RAY R ankle for reported pain - pending report     # Acute on chronic  anemia  # Non erosive gastritis  # Severe diverticulosis  # Grade/Stage I external hemorrhoids  # H/o  AVMs  # Colon polyps  # History of perisplenic Varices in 2023   b12 and folate deficiency- c/w supplement    -  EGD-Colonoscopy (07.22.24 @ 12:30) >Erythema in the stomach compatible with non-erosive gastritis. 2 non-bleeding 7mm AVMs were seen at the second portion of the duodenum cauterization was successfully applied to ensurehemostasis. Polyps (5 mm) in the ascending colon. (Polypectomy). Polyps (10 mm) in the ascending colon. (Polypectomy). Polyp (10 mm) in the ascending colon. (Polypectomy, Endoclip). 3 non-bleeding AVMs ranging in size between 7-10mm were seen in the cecum.Using APC probe, cauterization was successfully performed to ensure hemostasis. 3 non-bleeding AVMs ranging in size between 7-105 mm were seen in theascending colon. Using APC probe, cauterization was successfully performed to ensure hemostasis. Severe diverticulosis of the the left side of the colon. Grade/Stage I external hemorrhoids.  - c/w protonix  - s/p 1U pRBC on 8/2   completed course  IV Venofer - f/u with nephro for  WOODY - start WOODY   c/wb12 and folate replacement for vit b12 and folate deficiency  op hematology and GI    keep type and screen active     # Chronic diastolic CHF  # Severe aortic stenosis   -  TTE Echo Complete w/o Contrast w/ Doppler (08.01.24 @ 11:03) >EF of 56 %. Mild asymmetric left ventricular hypertrophy. Grade II diastolic dysfunction). Severe aortic valve stenosis , Mild to moderate mitral valve regurgitation. Moderate aortic regurgitation.  - Patient to follow up with Dr. Hernandez as an outpatient for TAVR  - c/w metoprolol, lasix    # Thrombocytopenia- resolved    # DVT prophylaxis- HEPARIN SQ-  LE duplex  negative     GOC : DNR/ DNI with NIV  palliative team consult - Appreciated - I confirmed today 8/11 from the patient son Mr. Acuña  that she is DNR/DNI       as per the patient son Mr. Acuña and updated him , and when I told him that i spoke with his brother Mr. Keith 8/9  he stated that he is the HCP and that his brother Tello Reeves is not involved in his mother care and that he should not be updated     # Pending: monitor mental status, GI , NEPHRO neurology , burn team,  Bowel movement   BMP, CBC ,   # Disposition: STR- PT eval    78-year-old female with past medical history of   CKD, HTN, CCY, gout,  severe AS being planned for tAVR, and history of severe anemia secondary to chronic upper GI bleeding due to  AV malformation gastric body dieulafoy lesion, weekly blood transfusions presenting to ER for a rupture pressure ulcer of the buttock area.   Patient states that last night was on her toilet which cracked while she was sitting on it, and patient was unable to get off the toilet for 4 hours.  By the time EMS arrived skin had ulcerated, with a blister that ruptured predominantly to the left buttock area (appears to be like burn blister that ruptured) with linear abrasions to the bilateral upper thighs/buttocks.  Patient in so much pain that she cannot stand or walk,    # Bilateral buttocks and sacrum deep Tissue Injury  wound team input appreciated (Bilateral buttocks and sacrum region (mimicking toilet seat) )   ID inpout appreciated , hold antibiotics -  wounds without any gross signs of infection    - Cleanse wound to bilateral buttocks and sacrum with Vashe wound cleanser. Pat dry, apply Silvadene, cover with Xeroform and abdominal pad twice a day and prn for soiling.   frequent turning, off loading,and positioning and skin care as per protocol, Maintain pressure injury prevention, Keep skin clean, Offload heels, Monitor wound for changes and notify provider if any   nutrition support   IV tylenol given   Burn team consult-plan for  bedside debridement of necrotic tissue    # Acute kidney inury on CKD stage 4, prerenal  # Rhabdomylosis- resolved  # Hyperkalemia  - Creatine Kinase normalized   - US Kidney and Bladder (07.30.24 @ 07:49) >No definite hydronephrosis  - S/p IV fluids  nephro on board - recc appreciated    - monitor UO  - monitor BMP  - low k diet  - lokelma 10 q 24 if repeated k > 5.5 as per nephro - given  today   - monitor k  Monitor phosphoros   Creatinine Trend: 2.7<--, 2.6<--, 2.7<--, 2.8<--, 2.6<--, 2.5<--  see below for details of HD     [] Pt noted sleepy from 8/6 as per nursing staff   ABG wnl / TSH 4.36  / CT HEAD negative for acute  AND   Neurology consult appreciated - ammonia level is elevated 206   laculose started overnight as per neuro reccs - discussed with the patient son, will contiue for now as at trial , moving her bowel   CXR noted  Neurochecs   no fever no leukocytosis   CTAP done and reported-  No evidence of an intraperitoneal or retroperitoneal hematoma; CT evidence of anemia as above.  Moderate rectal stool burden with associated rectal wall thickening and adjacent edema. Findings may reflect developing stercoral proctitis.  geive bowel regemin and consult GI noted , pending final reccs     -8/11 s/p RRT and stroke code - details as mentioned above   CT PERFUSION: No perfusion deficits to suggest areas of completed infarction or at risk   territory.   CTA HEAD/NECK:   2 mm outpouching/aneurysm within the cavernous portion of the right ICA.  No large vessel occlusion  or vascular malformation.     Vascular consulted for udall , nephro to start HD TODAY   NPO for now   place NGT iF Patient remained altered after HD IF ok with family   hgb 6.8 - give one unite pRBCs    trend trop , check labs and ammonia after HD today   Pt is being upgraded to step down - pending bed availability  frequent  neurocheck   f/u cultures, place culver and get urine culture as well         # Peripheral neuropathy  could be secondary to B12 deficiency   -  neurontin-hold   IV tyleonol as needed   - c/w B12, folate supplement   - evaluated by PT : STR  X RAY R ankle for reported pain - pending report     # Acute on chronic  anemia  # Non erosive gastritis  # Severe diverticulosis  # Grade/Stage I external hemorrhoids  # H/o  AVMs  # Colon polyps  # History of perisplenic Varices in 2023   b12 and folate deficiency- c/w supplement    -  EGD-Colonoscopy (07.22.24 @ 12:30) >Erythema in the stomach compatible with non-erosive gastritis. 2 non-bleeding 7mm AVMs were seen at the second portion of the duodenum cauterization was successfully applied to ensurehemostasis. Polyps (5 mm) in the ascending colon. (Polypectomy). Polyps (10 mm) in the ascending colon. (Polypectomy). Polyp (10 mm) in the ascending colon. (Polypectomy, Endoclip). 3 non-bleeding AVMs ranging in size between 7-10mm were seen in the cecum.Using APC probe, cauterization was successfully performed to ensure hemostasis. 3 non-bleeding AVMs ranging in size between 7-105 mm were seen in theascending colon. Using APC probe, cauterization was successfully performed to ensure hemostasis. Severe diverticulosis of the the left side of the colon. Grade/Stage I external hemorrhoids.  - c/w protonix  - s/p 1U pRBC on 8/2   completed course  IV Venofer - f/u with nephro for  WOODY - start WOODY   c/wb12 and folate replacement for vit b12 and folate deficiency  op hematology and GI    keep type and screen active     # Chronic diastolic CHF  # Severe aortic stenosis   -  TTE Echo Complete w/o Contrast w/ Doppler (08.01.24 @ 11:03) >EF of 56 %. Mild asymmetric left ventricular hypertrophy. Grade II diastolic dysfunction). Severe aortic valve stenosis , Mild to moderate mitral valve regurgitation. Moderate aortic regurgitation.  - Patient to follow up with Dr. Hernandez as an outpatient for TAVR  - c/w metoprolol, lasix    # Thrombocytopenia- resolved    # DVT prophylaxis- HEPARIN SQ-  LE duplex  negative     GOC : DNR/ DNI with NIV  palliative team consult - Appreciated - I confirmed today 8/11 from the patient son Mr. Acuña  that she is DNR/DNI       as per the patient son Mr. Acuña and updated him , and when I told him that i spoke with his brother Mr. Keith 8/9  he stated that he is the HCP and that his brother Tello Reeves is not involved in his mother care and that he should not be updated     # Pending: monitor mental status, GI , NEPHRO neurology , burn team,  Bowel movement   BMP, CBC ,   # Disposition: STR- PT eval

## 2024-08-11 NOTE — PROGRESS NOTE ADULT - SUBJECTIVE AND OBJECTIVE BOX
Neurology Stroke Progress Note    INTERVAL HPI/OVERNIGHT EVENTS:  78y Female with PMHx of CKD, HTN, CCY, gout, severe AS, and anemia, admitted to the hospital for a rupture pressure ulcer on the buttock. Neurology currently following for increased drowsiness, AMS, and generalized weakness secondary to metabolic encephalopathy 2/2 worsening kidney function. Pt went to bed at her new baseline per primary team at 9pm yesterday, woke up at 6am to get her medications when the bedside nurse noticed the pt was not following commands, nonverbal, not withdrawing from painful stimuli in any extremities. Stroke code called on 4B.       MEDICATIONS  (STANDING):  acetaminophen     Tablet .. 1000 milliGRAM(s) Oral every 8 hours  chlorhexidine 2% Cloths 1 Application(s) Topical daily  cyanocobalamin Injectable 1000 MICROGram(s) IntraMuscular daily  epoetin raquel (EPOGEN) Injectable 98298 Unit(s) SubCutaneous every 7 days  folic acid 1 milliGRAM(s) Oral daily  heparin   Injectable 5000 Unit(s) SubCutaneous every 12 hours  lactulose Syrup 20 Gram(s) Oral every 6 hours  lidocaine 1%/epinephrine 1:100,000 Inj 3 Vial(s) Local Injection once  metoprolol tartrate 12.5 milliGRAM(s) Oral every 12 hours  pantoprazole    Tablet 40 milliGRAM(s) Oral before breakfast  polyethylene glycol 3350 17 Gram(s) Oral daily  senna 2 Tablet(s) Oral at bedtime  sodium zirconium cyclosilicate 10 Gram(s) Oral daily    MEDICATIONS  (PRN):      Allergies    aspirin (Rash; Other)  penicillins (Rash; Urticaria; Hives; Blisters)  latex (Unknown)  EKG leads (Hives)    Intolerances        ROS: As per HPI, otherwise negative    Vital Signs Last 24 Hrs  T(C): 36.4 (11 Aug 2024 08:06), Max: 36.9 (11 Aug 2024 00:20)  T(F): 97.6 (11 Aug 2024 08:06), Max: 98.4 (11 Aug 2024 00:20)  HR: 63 (11 Aug 2024 08:06) (63 - 85)  BP: 106/41 (11 Aug 2024 08:06) (91/53 - 148/65)  BP(mean): --  RR: 18 (11 Aug 2024 08:06) (18 - 18)  SpO2: 97% (11 Aug 2024 08:06) (97% - 98%)    Parameters below as of 11 Aug 2024 08:06  Patient On (Oxygen Delivery Method): room air      Physical exam:    Neurologic:  -Mental status: unresponsive, nonverbal, does not respond to verbal stimuli noxious or sternal rub   -Cranial nerves:   III, IV, VI: No gaze noted. Pupils equally round and reactive to light, does not BTT   VII: Face is symmetric with normal eye closure and smile  Motor: Normal bulk and tone. No movement in any extremity   Sensation: does not respond to noxious stimuli in any extremity   Reflexes: +corneal         LABS:                        6.8    5.82  )-----------( 191      ( 11 Aug 2024 07:01 )             23.1     08-11    144  |  109  |  124<HH>  ----------------------------<  98  5.6<H>   |  23  |  2.8<H>    Ca    7.9<L>      11 Aug 2024 07:01  Phos  5.6     08-11  Mg     2.9     08-11    TPro  4.6<L>  /  Alb  2.6<L>  /  TBili  0.7  /  DBili  x   /  AST  36  /  ALT  24  /  AlkPhos  93  08-11    PT/INR - ( 11 Aug 2024 07:01 )   PT: 12.00 sec;   INR: 1.05 ratio         PTT - ( 11 Aug 2024 07:01 )  PTT:31.3 sec  Urinalysis Basic - ( 11 Aug 2024 07:01 )    Color: x / Appearance: x / SG: x / pH: x  Gluc: 98 mg/dL / Ketone: x  / Bili: x / Urobili: x   Blood: x / Protein: x / Nitrite: x   Leuk Esterase: x / RBC: x / WBC x   Sq Epi: x / Non Sq Epi: x / Bacteria: x        RADIOLOGY & ADDITIONAL TESTS:  < from: CT Brain Stroke Protocol (08.11.24 @ 07:23) >    IMPRESSION:  No CT evidence of acute intracranial pathology.    < end of copied text >   Neurology Stroke Progress Note    INTERVAL HPI/OVERNIGHT EVENTS:  78y Female with PMHx of CKD, HTN, CCY, gout, severe AS, and anemia, admitted to the hospital for a rupture pressure ulcer on the buttock. Neurology currently following for increased drowsiness, AMS, and generalized weakness secondary to metabolic encephalopathy 2/2 worsening kidney function. Pt went to bed at her new baseline per primary team at 9pm yesterday, woke up at 6am to get her medications when the bedside nurse noticed the pt was not following commands, nonverbal, not withdrawing from painful stimuli in any extremities. Stroke code called on 4B.       MEDICATIONS  (STANDING):  acetaminophen     Tablet .. 1000 milliGRAM(s) Oral every 8 hours  chlorhexidine 2% Cloths 1 Application(s) Topical daily  cyanocobalamin Injectable 1000 MICROGram(s) IntraMuscular daily  epoetin raquel (EPOGEN) Injectable 86659 Unit(s) SubCutaneous every 7 days  folic acid 1 milliGRAM(s) Oral daily  heparin   Injectable 5000 Unit(s) SubCutaneous every 12 hours  lactulose Syrup 20 Gram(s) Oral every 6 hours  lidocaine 1%/epinephrine 1:100,000 Inj 3 Vial(s) Local Injection once  metoprolol tartrate 12.5 milliGRAM(s) Oral every 12 hours  pantoprazole    Tablet 40 milliGRAM(s) Oral before breakfast  polyethylene glycol 3350 17 Gram(s) Oral daily  senna 2 Tablet(s) Oral at bedtime  sodium zirconium cyclosilicate 10 Gram(s) Oral daily    MEDICATIONS  (PRN):      Allergies    aspirin (Rash; Other)  penicillins (Rash; Urticaria; Hives; Blisters)  latex (Unknown)  EKG leads (Hives)    Intolerances        ROS: As per HPI, otherwise negative    Vital Signs Last 24 Hrs  T(C): 36.4 (11 Aug 2024 08:06), Max: 36.9 (11 Aug 2024 00:20)  T(F): 97.6 (11 Aug 2024 08:06), Max: 98.4 (11 Aug 2024 00:20)  HR: 63 (11 Aug 2024 08:06) (63 - 85)  BP: 106/41 (11 Aug 2024 08:06) (91/53 - 148/65)  BP(mean): --  RR: 18 (11 Aug 2024 08:06) (18 - 18)  SpO2: 97% (11 Aug 2024 08:06) (97% - 98%)    Parameters below as of 11 Aug 2024 08:06  Patient On (Oxygen Delivery Method): room air      Physical exam:    Neurologic:  -Mental status: unresponsive, nonverbal, does not respond to verbal stimuli noxious or sternal rub   -Cranial nerves:   III, IV, VI: No gaze noted. Pupils equally round and reactive to light, does not BTT   VII: Face is symmetric with normal eye closure and smile  Motor: Normal bulk and tone. No movement in any extremity   Sensation: does not respond to noxious stimuli in any extremity   Reflexes: +corneal         LABS:                        6.8    5.82  )-----------( 191      ( 11 Aug 2024 07:01 )             23.1     08-11    144  |  109  |  124<HH>  ----------------------------<  98  5.6<H>   |  23  |  2.8<H>    Ca    7.9<L>      11 Aug 2024 07:01  Phos  5.6     08-11  Mg     2.9     08-11    TPro  4.6<L>  /  Alb  2.6<L>  /  TBili  0.7  /  DBili  x   /  AST  36  /  ALT  24  /  AlkPhos  93  08-11    PT/INR - ( 11 Aug 2024 07:01 )   PT: 12.00 sec;   INR: 1.05 ratio         PTT - ( 11 Aug 2024 07:01 )  PTT:31.3 sec  Urinalysis Basic - ( 11 Aug 2024 07:01 )    Color: x / Appearance: x / SG: x / pH: x  Gluc: 98 mg/dL / Ketone: x  / Bili: x / Urobili: x   Blood: x / Protein: x / Nitrite: x   Leuk Esterase: x / RBC: x / WBC x   Sq Epi: x / Non Sq Epi: x / Bacteria: x        RADIOLOGY & ADDITIONAL TESTS:  < from: CT Brain Stroke Protocol (08.11.24 @ 07:23) >    IMPRESSION:  No CT evidence of acute intracranial pathology.    < end of copied text >    < from: CT Angio Brain Stroke Protocol  w/ IV Cont (08.11.24 @ 09:41) >  IMPRESSION:    CT PERFUSION:  No perfusion deficits to suggest areas of completed infarction or at risk   territory.    CTA HEAD/NECK:    2 mm outpouching/aneurysm within the cavernous portion of the right ICA.    No large vessel occlusion  or vascular malformation.    < end of copied text >

## 2024-08-11 NOTE — PROGRESS NOTE ADULT - SUBJECTIVE AND OBJECTIVE BOX
Nephrology Progress Note    LATRICIA ARREAGA  MRN-[id number]  78y  Female    S:  Patient is seen and examined, events over the last 24h noted.    O:  Allergies:  aspirin (Rash; Other)  penicillins (Rash; Urticaria; Hives; Blisters)  latex (Unknown)  EKG leads (Hives)    Hospital Medications:   MEDICATIONS  (STANDING):  acetaminophen     Tablet .. 1000 milliGRAM(s) Oral every 8 hours  chlorhexidine 2% Cloths 1 Application(s) Topical daily  cyanocobalamin Injectable 1000 MICROGram(s) IntraMuscular daily  epoetin raquel (EPOGEN) Injectable 96000 Unit(s) SubCutaneous every 7 days  folic acid 1 milliGRAM(s) Oral daily  heparin   Injectable 5000 Unit(s) SubCutaneous every 12 hours  lactulose Syrup 20 Gram(s) Oral every 6 hours  lidocaine 1%/epinephrine 1:100,000 Inj 3 Vial(s) Local Injection once  metoprolol tartrate 12.5 milliGRAM(s) Oral every 12 hours  pantoprazole    Tablet 40 milliGRAM(s) Oral before breakfast  polyethylene glycol 3350 17 Gram(s) Oral daily  senna 2 Tablet(s) Oral at bedtime  sodium zirconium cyclosilicate 10 Gram(s) Oral daily    MEDICATIONS  (PRN):    Home Medications:  furosemide 40 mg oral tablet: 1 tab(s) orally once a day (30 Jul 2024 04:41)  metoprolol tartrate 25 mg oral tablet: 1 tab(s) orally once a day (30 Jul 2024 04:41)      VITALS:  Daily     Daily   T(F): 97.6 (08-11-24 @ 08:06), Max: 98.4 (08-11-24 @ 00:20)  HR: 63 (08-11-24 @ 08:06)  BP: 106/41 (08-11-24 @ 08:06)  RR: 18 (08-11-24 @ 08:06)  SpO2: 97% (08-11-24 @ 08:06)  Wt(kg): --  I&O's Detail    10 Aug 2024 07:01  -  11 Aug 2024 07:00  --------------------------------------------------------  IN:    Oral Fluid: 60 mL  Total IN: 60 mL    OUT:  Total OUT: 0 mL    Total NET: 60 mL        I&O's Summary    10 Aug 2024 07:01  -  11 Aug 2024 07:00  --------------------------------------------------------  IN: 60 mL / OUT: 0 mL / NET: 60 mL          PHYSICAL EXAM:  Gen: obtunded  Chest: b/l breath sounds  Abd: soft  Extremities: LE edema      LABS:  ABG - ( 11 Aug 2024 07:00 )  pH, Arterial: 7.44  pH, Blood: x     /  pCO2: 37    /  pO2: 86    / HCO3: 25    / Base Excess: 1.1   /  SaO2: 97.8      08-11    144  |  109  |  124<HH>  ----------------------------<  98  5.6<H>   |  23  |  2.8<H>    Ca    7.9<L>      11 Aug 2024 07:01  Phos  5.6     08-11  Mg     2.9     08-11    TPro  4.6<L>  /  Alb  2.6<L>  /  TBili  0.7  /  DBili      /  AST  36  /  ALT  24  /  AlkPhos  93  08-11    Phosphorus: 5.6 mg/dL (08-11-24 @ 07:01)  Phosphorus: 5.3 mg/dL (08-09-24 @ 07:43)                          6.8    5.82  )-----------( 191      ( 11 Aug 2024 07:01 )             23.1     Mean Cell Volume: 100.0 fL (08-11-24 @ 07:01)    Creatinine trend:  Creatinine: 2.8 mg/dL (08-11-24 @ 07:01)  Creatinine: 2.7 mg/dL (08-09-24 @ 17:47)  Creatinine: 2.6 mg/dL (08-09-24 @ 07:43)  Creatinine: 2.7 mg/dL (08-08-24 @ 18:26)  Creatinine: 2.8 mg/dL (08-08-24 @ 06:41)

## 2024-08-11 NOTE — CONSULT NOTE ADULT - SUBJECTIVE AND OBJECTIVE BOX
VASCULAR SURGERY CONSULT NOTE    Patient: LATRICIA ARREAGA , 78y (43)Female   MRN: [id number]  Location: [place] [address] A  Visit: 24 Inpatient  Date: 24 @ 17:21    HPI:  HPI : Patient is a 78-year-old female with past medical history of   CKD, HTN, CCY, gout,  severe AS being planned for tAVR, and history of severe anemia secondary to chronic upper GI bleeding due to  AV malformation gastric body dieulafoy lesion, weekly blood transfusions presenting to ER for a rupture pressure ulcer of the buttock area today.   Patient states that last night was on her toilet which cracked while she was sitting on it, and patient was unable to get off the toilet for 4 hours.  By the time EMS arrived skin had ulcerated, with a blister that ruptured predominantly to the left buttock area (appears to be like burn blister that ruptured) with linear abrasions to the bilateral upper thighs/buttocks.  Patient in so much pain that she cannot stand or walk, Of note, pt was recently admitted to the hospital between - for hematochezia, s/p 1 unit prbc. Denies fever, chills. N/V, abd pain.     Vascular surgery was consulted for udall placement for urgent HD for uremic encephalopathy. Rapid response was triggered in the morning for alter mental status. Per son, her baselines is A&O x4.      Vital Signs Last 24 Hrs  T(C): 36.4 (2024 00:51), Max: 37.1 (2024 18:30)  T(F): 97.6 (2024 00:51), Max: 98.7 (2024 18:30)  HR: 65 (2024 00:51) (59 - 65)  BP: 146/69 (2024 00:51) (112/46 - 146/69)  BP(mean): --  RR: 18 (2024 00:51) (18 - 18)  SpO2: 99% (2024 00:51) (95% - 99%)    Parameters below as of 2024 00:51  Patient On (Oxygen Delivery Method): room air    Labs significant for wbc 11k , hgb 9.1 , Cr 3.5 (baseline 2-3)   s/p  1L NS in ED   Admitted for wound management and placement as pt can not care for her own ADLs. (2024 03:46)      PAST MEDICAL & SURGICAL HISTORY:  HTN (hypertension)      Heart murmur      Eczema      Anemia  iron infusions and PRBC transfusions      Aortic stenosis      Chronic kidney disease (CKD)      2019 novel coronavirus disease (COVID-19)  2020- [facility] admission      Gastric AVM      H/O appendicitis      S/P cholecystectomy      History of esophagogastroduodenoscopy (EGD)      H/O colonoscopy      H/O  section      History of appendectomy      Port-A-Cath in place  right CW          Home Medications:  furosemide 40 mg oral tablet: 1 tab(s) orally once a day (2024 04:41)  metoprolol tartrate 25 mg oral tablet: 1 tab(s) orally once a day (2024 04:41)        VITALS:  T(F): 98.1 (24 @ 14:08), Max: 98.4 (24 @ 00:20)  HR: 67 (24 @ 14:08) (63 - 85)  BP: 105/39 (24 @ 14:08) (91/53 - 148/65)  RR: 18 (24 @ 14:08) (18 - 18)  SpO2: 100% (24 @ 14:08) (97% - 100%)    PHYSICAL EXAM:  General: AAOx0, lethargic, responses to painful stimuli    Cardiac: RRR  Respiratory: Normal respiratory effort, breath sounds equal BL  Abdomen: Soft, non-distended  Skin: Warm/dry, normal color, no jaundice    MEDICATIONS  (STANDING):  acetaminophen     Tablet .. 1000 milliGRAM(s) Oral every 8 hours  chlorhexidine 2% Cloths 1 Application(s) Topical daily  cyanocobalamin Injectable 1000 MICROGram(s) IntraMuscular daily  epoetin raquel (EPOGEN) Injectable 93584 Unit(s) SubCutaneous every 7 days  folic acid 1 milliGRAM(s) Oral daily  heparin   Injectable 5000 Unit(s) SubCutaneous every 12 hours  lactulose Syrup 20 Gram(s) Oral every 6 hours  lidocaine 1%/epinephrine 1:100,000 Inj 2 Vial(s) Local Injection once  lidocaine 1%/epinephrine 1:100,000 Inj 3 Vial(s) Local Injection once  metoprolol tartrate 12.5 milliGRAM(s) Oral every 12 hours  pantoprazole    Tablet 40 milliGRAM(s) Oral before breakfast  polyethylene glycol 3350 17 Gram(s) Oral daily  senna 2 Tablet(s) Oral at bedtime  sodium zirconium cyclosilicate 10 Gram(s) Oral daily    MEDICATIONS  (PRN):      LAB/STUDIES:                        6.8    5.82  )-----------( 191      ( 11 Aug 2024 07:01 )             23.1     08-    144  |  109  |  124<HH>  ----------------------------<  98  5.6<H>   |  23  |  2.8<H>    Ca    7.9<L>      11 Aug 2024 07:01  Phos  5.6     08-  Mg     2.9     08    TPro  4.6<L>  /  Alb  2.6<L>  /  TBili  0.7  /  DBili  x   /  AST  36  /  ALT  24  /  AlkPhos  93  08-11    PT/INR - ( 11 Aug 2024 07:01 )   PT: 12.00 sec;   INR: 1.05 ratio         PTT - ( 11 Aug 2024 07:01 )  PTT:31.3 sec  LIVER FUNCTIONS - ( 11 Aug 2024 07:01 )  Alb: 2.6 g/dL / Pro: 4.6 g/dL / ALK PHOS: 93 U/L / ALT: 24 U/L / AST: 36 U/L / GGT: x           Urinalysis Basic - ( 11 Aug 2024 12:55 )    Color: Yellow / Appearance: Cloudy / S.021 / pH: x  Gluc: x / Ketone: Negative mg/dL  / Bili: Negative / Urobili: 0.2 mg/dL   Blood: x / Protein: Negative mg/dL / Nitrite: Negative   Leuk Esterase: Negative / RBC: 1 /HPF / WBC 4 /HPF   Sq Epi: x / Non Sq Epi: 15 /HPF / Bacteria: Occasional /HPF      CARDIAC MARKERS ( 11 Aug 2024 07:01 )  x     / x     / x     / x     / 3.8 ng/mL          ABG - ( 11 Aug 2024 07:00 )  pH, Arterial: 7.44  pH, Blood: x     /  pCO2: 37    /  pO2: 86    / HCO3: 25    / Base Excess: 1.1   /  SaO2: 97.8          ASSESSMENT:  79 yo F w/ PMHx of CKD, HTN, severe AS being planned for tAVR, severe anemia 2/2 chronic upper GI bleeding due to AV malformation gastric body dieulafoy lesion, weekly blood transfusions admitted for pressure ulcer. Vascular surgery was consulted for udall placement for urgent HD 2/2 uremic encephalopathy.     PLAN:   - R. femoral udall placement  - US for correct placement   - Rest care per primary team      GREEN TEAM SPECTRA: [id number]

## 2024-08-11 NOTE — RAPID RESPONSE TEAM SUMMARY - NSADDTLFINDINGSRRT_GEN_ALL_CORE
Hemodynamically Stable  ICU Vital Signs Last 24 Hrs  T(C): 36.9 (11 Aug 2024 00:20), Max: 36.9 (11 Aug 2024 00:20)  T(F): 98.4 (11 Aug 2024 00:20), Max: 98.4 (11 Aug 2024 00:20)  HR: 73 (11 Aug 2024 05:43) (73 - 85)  BP: 121/65 (11 Aug 2024 05:43) (91/53 - 148/65)  BP(mean): --  ABP: --  ABP(mean): --  RR: 18 (11 Aug 2024 05:43) (18 - 18)  SpO2: 98% (10 Aug 2024 16:02) (98% - 98%)    O2 Parameters below as of 10 Aug 2024 08:37  Patient On (Oxygen Delivery Method): room air    NO  recent Labs  Protecting Airway  ECG Normal Sinus No Acute ischemic Changes     ABG - ( 11 Aug 2024 07:00 )  pH, Arterial: 7.44  pH, Blood: x     /  pCO2: 37    /  pO2: 86    / HCO3: 25    / Base Excess: 1.1   /  SaO2: 97.8

## 2024-08-11 NOTE — RAPID RESPONSE TEAM SUMMARY - NSSITUATIONBACKGROUNDRRT_GEN_ALL_CORE
78-year-old female with past medical history of   CKD, HTN, CCY, gout,  severe AS being planned for tAVR, and history of severe anemia secondary to chronic upper GI bleeding due to  AV malformation gastric body dieulafoy lesion, weekly blood transfusions presenting to ER for a rupture pressure ulcer of the buttock area.   Patient states that last night was on her toilet which cracked while she was sitting on it, and patient was unable to get off the toilet for 4 hours.  By the time EMS arrived skin had ulcerated, with a blister that ruptured predominantly to the left buttock area (appears to be like burn blister that ruptured) with linear abrasions to the bilateral upper thighs/buttocks.  Admitted with Dx   Bilateral buttocks and sacrum deep Tissue Injury  Acute kidney inury on CKD stage 4, prerenal- Resovling   Metabolic Encephalopathy  Drowsy Elevated Amonia  Acute on chronic  anemia  Chronic diastolic CHF  DNR/DNI    Rapid Response Called for AMS  Patient Unarousable      78-year-old female with past medical history of   CKD, HTN, CCY, gout,  severe AS being planned for tAVR, and history of severe anemia secondary to chronic upper GI bleeding due to  AV malformation gastric body dieulafoy lesion, weekly blood transfusions presenting to ER for a rupture pressure ulcer of the buttock area.   Patient states that last night was on her toilet which cracked while she was sitting on it, and patient was unable to get off the toilet for 4 hours.  By the time EMS arrived skin had ulcerated, with a blister that ruptured predominantly to the left buttock area (appears to be like burn blister that ruptured) with linear abrasions to the bilateral upper thighs/buttocks.  Admitted with Dx   Bilateral buttocks and sacrum deep Tissue Injury  Acute kidney inury on CKD stage 4, prerenal- Resovling   Metabolic Encephalopathy  Drowsy Elevated Ammonia  Acute on chronic  anemia  Chronic diastolic CHF  DNR/DNI    Rapid Response Called for AMS  Patient Unarousable

## 2024-08-11 NOTE — RAPID RESPONSE TEAM SUMMARY - NSOTHERINTERVENTIONSRRT_GEN_ALL_CORE
Patient seen at bedside  40 minutes time spent evaluating and treating the patient's acute illness as well as time spent reviewing labs, radiology, discussing with patient and/or patient's family, and discussing the case with a multidisciplinary team. 
stat labs drawn. ABG. stroke imaging

## 2024-08-11 NOTE — CONSULT NOTE ADULT - SUBJECTIVE AND OBJECTIVE BOX
**STROKE CODE CONSULT NOTE**    Last known well time/Time of onset of symptoms: 7:20     HPI: 78y Female with PMHx of     T(C): 36.4 (24 @ 08:06), Max: 36.9 (24 @ 00:20)  HR: 63 (24 @ 08:06) (63 - 85)  BP: 106/41 (24 @ 08:06) (91/53 - 148/65)  RR: 18 (24 @ 08:06) (18 - 18)  SpO2: 97% (24 @ 08:06) (97% - 98%)    PAST MEDICAL & SURGICAL HISTORY:  HTN (hypertension)      Heart murmur      Eczema      Anemia  iron infusions and PRBC transfusions      Aortic stenosis      Chronic kidney disease (CKD)       novel coronavirus disease (COVID-19)  2020- [facility] admission      Gastric AVM      H/O appendicitis      S/P cholecystectomy      History of esophagogastroduodenoscopy (EGD)      H/O colonoscopy      H/O  section      History of appendectomy      Port-A-Cath in place  right CW          FAMILY HISTORY:  FH: kidney disease (Father)    FH: breast cancer (Mother)    FH: multiple myeloma (Mother)        SOCIAL HISTORY:  Denies smoking, drinking, or drug use    ROS: ***  Constitutional: No fever, weight loss or fatigue  Eyes: No eye pain, visual disturbances, or discharge  ENMT:  No difficulty hearing, tinnitus, vertigo; No sinus or throat pain  Neck: No pain or stiffness  Respiratory: No cough, wheezing, chills or hemoptysis  Cardiovascular: No chest pain, palpitations, shortness of breath, dizziness or leg swelling  Gastrointestinal: No abdominal pain. No nausea, vomiting or hematemesis; No diarrhea or constipation. Nohematochezia.  Genitourinary: No dysuria, frequency, hematuria or incontinence  Neurological: As per HPI  Skin: No itching, burning, rashes or lesions   Endocrine: No heat or cold intolerance; No hair loss  Musculoskeletal: No joint pain or swelling; No muscle, back or extremity pain  Psychiatric: No depression, anxiety, mood swings or difficulty sleeping  Heme/Lymph: No easy bruising or bleeding gums    MEDICATIONS  (STANDING):  acetaminophen     Tablet .. 1000 milliGRAM(s) Oral every 8 hours  chlorhexidine 2% Cloths 1 Application(s) Topical daily  cyanocobalamin Injectable 1000 MICROGram(s) IntraMuscular daily  epoetin raquel (EPOGEN) Injectable 99604 Unit(s) SubCutaneous every 7 days  folic acid 1 milliGRAM(s) Oral daily  heparin   Injectable 5000 Unit(s) SubCutaneous every 12 hours  lactulose Syrup 20 Gram(s) Oral every 6 hours  lidocaine 1%/epinephrine 1:100,000 Inj 3 Vial(s) Local Injection once  metoprolol tartrate 12.5 milliGRAM(s) Oral every 12 hours  pantoprazole    Tablet 40 milliGRAM(s) Oral before breakfast  polyethylene glycol 3350 17 Gram(s) Oral daily  senna 2 Tablet(s) Oral at bedtime  sodium zirconium cyclosilicate 10 Gram(s) Oral daily    MEDICATIONS  (PRN):    Home Medications:  furosemide 40 mg oral tablet: 1 tab(s) orally once a day (2024 04:41)  metoprolol tartrate 25 mg oral tablet: 1 tab(s) orally once a day (2024 04:41)      Allergies    aspirin (Rash; Other)  penicillins (Rash; Urticaria; Hives; Blisters)  latex (Unknown)  EKG leads (Hives)    Intolerances      Vital Signs Last 24 Hrs  T(C): 36.4 (11 Aug 2024 08:06), Max: 36.9 (11 Aug 2024 00:20)  T(F): 97.6 (11 Aug 2024 08:06), Max: 98.4 (11 Aug 2024 00:20)  HR: 63 (11 Aug 2024 08:06) (63 - 85)  BP: 106/41 (11 Aug 2024 08:06) (91/53 - 148/65)  BP(mean): --  RR: 18 (11 Aug 2024 08:06) (18 - 18)  SpO2: 97% (11 Aug 2024 08:06) (97% - 98%)    Parameters below as of 11 Aug 2024 08:06  Patient On (Oxygen Delivery Method): room air        Physical exam:  General: No acute distress, awake and alert  Cardiovascular: Regular rate and rhythm, no murmurs, rubs, or gallops. No bruits  Pulmonary: Anterior breath sounds clear bilaterally, no crackles or wheezing. No use of accessory muscles  Extremities: Radial and DP pulses +2, no edema    Neurologic:  -Mental status: Awake, alert, oriented to person, place, and time. Speech is fluent with intact naming, repetition, and comprehension, no dysarthria. Recent and remote memory intact. Follows commands. Attention/concentration intact. Fund of knowledge appropriate.  -Cranial nerves:   II: Visual fields are full to confrontation.  III, IV, VI: Extraocular movements are intact without nystagmus. Pupils equally round and reactive to light  V:  Facial sensation V1-V3 equal and intact   VII: Face is symmetric with normal eye closure and smile  VIII: Hearing is bilaterally intact to finger rub  IX, X: Uvula is midline and soft palate rises symmetrically  XI: Head turning and shoulder shrug are intact.  XII: Tongue protrudes midline  Motor: Normal bulk and tone. No pronator drift. Strength bilateral upper extremity 5/5, bilateral lower extremities 5/5.  Rapid alternating movements intact and symmetric  Sensation: Intact to light touch bilaterally. No neglect or extinction on double simultaneous testing.  Coordination: No dysmetria on finger-to-nose and heel-to-shin bilaterally  Reflexes: Downgoing toes bilaterally   Gait: Narrow gait and steady    NIHSS: ****  ICH: *****  GCS: *****  ASPECTS Score: ****    Fingerstick Blood Glucose: CAPILLARY BLOOD GLUCOSE      POCT Blood Glucose.: 115 mg/dL (11 Aug 2024 06:46)    LABS:                        6.8    5.82  )-----------( 191      ( 11 Aug 2024 07:01 )             23.1     08-11    144  |  109  |  124<HH>  ----------------------------<  98  5.6<H>   |  23  |  2.8<H>    Ca    7.9<L>      11 Aug 2024 07:01  Phos  5.6     08-11  Mg     2.9     08-11    TPro  4.6<L>  /  Alb  2.6<L>  /  TBili  0.7  /  DBili  x   /  AST  36  /  ALT  24  /  AlkPhos  93  08-11    PT/INR - ( 11 Aug 2024 07:01 )   PT: 12.00 sec;   INR: 1.05 ratio         PTT - ( 11 Aug 2024 07:01 )  PTT:31.3 sec  CARDIAC MARKERS ( 11 Aug 2024 07:01 )  x     / x     / x     / x     / 3.8 ng/mL      Urinalysis Basic - ( 11 Aug 2024 07:01 )    Color: x / Appearance: x / SG: x / pH: x  Gluc: 98 mg/dL / Ketone: x  / Bili: x / Urobili: x   Blood: x / Protein: x / Nitrite: x   Leuk Esterase: x / RBC: x / WBC x   Sq Epi: x / Non Sq Epi: x / Bacteria: x        RADIOLOGY & ADDITIONAL STUDIES:    HCT:    CTA:    CTP:      -----------------------------------------------------------------------------------------------------------------  IV-tenecetplase(Y/N):    ***                              Bolus time:  Reason IV-tenecetplase not given: ***  Alteplase dosing verified with RN _______      ASSESSMENT/PLAN:    78y Female w/ PMH ***. HCT showed ***. CTA showed ***. Given *** tenecetplase was ***.         **STROKE CODE CONSULT NOTE**    Last known well time/Time of onset of symptoms: 9PM yesterday     HPI: 78y Female with PMHx of CKD, HTN, CCY, gout, severe AS, and anemia, admitted to the hospital for a rupture pressure ulcer on the buttock. Neurology currently following for increased drowsiness and weakness and exam was significant for b/l extremity weakness, mild dysmetria, and asterixis. Mental status and exam findings concerning for metabolic encephalopathy 2/2 worsening kidney function. Pt went to bed at her new baseline per primary team at 9pm yesterday, woke up at 6am to get her medications when the bedside nurse noticed the pt was not following commands, nonverbal, not withdrawing from painful stimuli in any extremities. Stroke code called on 4B.       T(C): 36.4 (24 @ 08:06), Max: 36.9 (24 @ 00:20)  HR: 63 (24 @ 08:06) (63 - 85)  BP: 106/41 (24 @ 08:06) (91/53 - 148/65)  RR: 18 (24 @ 08:06) (18 - 18)  SpO2: 97% (24 @ 08:06) (97% - 98%)    PAST MEDICAL & SURGICAL HISTORY:  HTN (hypertension)      Heart murmur      Eczema      Anemia  iron infusions and PRBC transfusions      Aortic stenosis      Chronic kidney disease (CKD)       novel coronavirus disease (COVID-19)  2020- [facility] admission      Gastric AVM      H/O appendicitis      S/P cholecystectomy      History of esophagogastroduodenoscopy (EGD)      H/O colonoscopy      H/O  section      History of appendectomy      Port-A-Cath in place  right CW          FAMILY HISTORY:  FH: kidney disease (Father)    FH: breast cancer (Mother)    FH: multiple myeloma (Mother)        SOCIAL HISTORY:  Denies smoking, drinking, or drug use    ROS: as per HPI     MEDICATIONS  (STANDING):  acetaminophen     Tablet .. 1000 milliGRAM(s) Oral every 8 hours  chlorhexidine 2% Cloths 1 Application(s) Topical daily  cyanocobalamin Injectable 1000 MICROGram(s) IntraMuscular daily  epoetin raquel (EPOGEN) Injectable 30089 Unit(s) SubCutaneous every 7 days  folic acid 1 milliGRAM(s) Oral daily  heparin   Injectable 5000 Unit(s) SubCutaneous every 12 hours  lactulose Syrup 20 Gram(s) Oral every 6 hours  lidocaine 1%/epinephrine 1:100,000 Inj 3 Vial(s) Local Injection once  metoprolol tartrate 12.5 milliGRAM(s) Oral every 12 hours  pantoprazole    Tablet 40 milliGRAM(s) Oral before breakfast  polyethylene glycol 3350 17 Gram(s) Oral daily  senna 2 Tablet(s) Oral at bedtime  sodium zirconium cyclosilicate 10 Gram(s) Oral daily    MEDICATIONS  (PRN):    Home Medications:  furosemide 40 mg oral tablet: 1 tab(s) orally once a day (2024 04:41)  metoprolol tartrate 25 mg oral tablet: 1 tab(s) orally once a day (2024 04:41)      Allergies    aspirin (Rash; Other)  penicillins (Rash; Urticaria; Hives; Blisters)  latex (Unknown)  EKG leads (Hives)    Intolerances      Vital Signs Last 24 Hrs  T(C): 36.4 (11 Aug 2024 08:06), Max: 36.9 (11 Aug 2024 00:20)  T(F): 97.6 (11 Aug 2024 08:06), Max: 98.4 (11 Aug 2024 00:20)  HR: 63 (11 Aug 2024 08:06) (63 - 85)  BP: 106/41 (11 Aug 2024 08:06) (91/53 - 148/65)  BP(mean): --  RR: 18 (11 Aug 2024 08:06) (18 - 18)  SpO2: 97% (11 Aug 2024 08:06) (97% - 98%)    Parameters below as of 11 Aug 2024 08:06  Patient On (Oxygen Delivery Method): room air        Physical exam:    Neurologic:  -Mental status: obtunded, nonverbal, does not respond to verbal stimuli noxious or sternal rub   -Cranial nerves:   III, IV, VI: No gaze noted. Pupils equally round and reactive to light, does not BTT   VII: Face is symmetric with normal eye closure and smile  Motor: Normal bulk and tone. No movement in any extremity   Sensation: does not respond to noxious stimuli in any extremity   Reflexes: +corneal     NIHSS    Fingerstick Blood Glucose: CAPILLARY BLOOD GLUCOSE      POCT Blood Glucose.: 115 mg/dL (11 Aug 2024 06:46)    LABS:                        6.8    5.82  )-----------( 191      ( 11 Aug 2024 07:01 )             23.1     08-11    144  |  109  |  124<HH>  ----------------------------<  98  5.6<H>   |  23  |  2.8<H>    Ca    7.9<L>      11 Aug 2024 07:01  Phos  5.6     08-  Mg     2.9     08-    TPro  4.6<L>  /  Alb  2.6<L>  /  TBili  0.7  /  DBili  x   /  AST  36  /  ALT  24  /  AlkPhos  93  08-11    PT/INR - ( 11 Aug 2024 07:01 )   PT: 12.00 sec;   INR: 1.05 ratio         PTT - ( 11 Aug 2024 07:01 )  PTT:31.3 sec  CARDIAC MARKERS ( 11 Aug 2024 07:01 )  x     / x     / x     / x     / 3.8 ng/mL      Urinalysis Basic - ( 11 Aug 2024 07:01 )    Color: x / Appearance: x / SG: x / pH: x  Gluc: 98 mg/dL / Ketone: x  / Bili: x / Urobili: x   Blood: x / Protein: x / Nitrite: x   Leuk Esterase: x / RBC: x / WBC x   Sq Epi: x / Non Sq Epi: x / Bacteria: x        RADIOLOGY & ADDITIONAL STUDIES:    HCT:    CTA:    CTP:

## 2024-08-11 NOTE — RAPID RESPONSE TEAM SUMMARY - NSSITUATIONBACKGROUNDRRT_GEN_ALL_CORE
per rn, pt was more altered than start of shift. last known well 9 PM. Has been [id number] throughout the week due to metabolic encephelopathy. vitals normal. FS puja. Exam - with mild grimacing to deep sternal rub. Otherwise unresponsive. Protecting airway for now.  per rn, pt was more altered than start of shift. last known well 9 PM. Has been [id number] throughout the week due to metabolic encephelopathy. vitals normal. FS puja. Exam - with mild grimacing to deep sternal rub. Otherwise unresponsive. Protecting airway for now.     As per attending (Dr. Ibarra), pt should have a full stroke workup despite poor kidney function (EVAN on CKD4). Pt should also be upgraded to ICU for closer monitoring, spoke to ICU fellow.     Pt's son has been contacted via phone, no response.

## 2024-08-11 NOTE — PROGRESS NOTE ADULT - ASSESSMENT
78-year-old female with past medical history of   CKD, HTN, CCY, gout,  severe AS being planned for TAVR, and history of severe anemia secondary to chronic upper GI bleeding due to  AV malformation gastric body dieulafoy lesion, weekly blood transfusions presenting to ER for a rupture pressure ulcer of the buttock area.  #EVAN on CKD4/ anemia   #AMS  - creat improved to baseline / BUN rising  - pt is obtunded, neuro on board, r/o acute CVA, c/f metabolic encephalopathy with uremia contributing / plan to start HD   - d/w pt's son at bedside, provided consent for HD   - hold diuretics  - low K diet if tolerating  - no hydro on renal/bladder sono   - culver / monitor urine output  - phos noted; monitor  - BP soft, acute on chronic anemia / r/o GI bleed  - start midodrine 10mg q8h if sys BP < 100  - lokelma 10 q 24 if repeated k > 5.5  - cont WOODY wkly  - neuro f/u   will follow

## 2024-08-11 NOTE — PROGRESS NOTE ADULT - SUBJECTIVE AND OBJECTIVE BOX
T H I S   I S    N O  T   A    F I N A L I Z E D   N O T E      LATRICIA ARREAGA  78y, Female  Allergy: aspirin (Rash; Other)  penicillins (Rash; Urticaria; Hives; Blisters)  latex (Unknown)  EKG leads (Hives)    Hospital Day: 13d    Patient seen and examined earlier today.     PMH/PSH:  PAST MEDICAL & SURGICAL HISTORY:  HTN (hypertension)      Heart murmur      Eczema      Anemia  iron infusions and PRBC transfusions      Aortic stenosis      Chronic kidney disease (CKD)      2019 novel coronavirus disease (COVID-19)  2020- REHAB admission      Gastric AVM      H/O appendicitis      S/P cholecystectomy      History of esophagogastroduodenoscopy (EGD)      H/O colonoscopy      H/O  section      History of appendectomy      Port-A-Cath in place  right CW          LAST 24-Hr EVENTS:    VITALS:  T(F): 97.6 (24 @ 08:06), Max: 98.4 (24 @ 00:20)  HR: 63 (24 @ 08:06)  BP: 106/41 (24 @ 08:06) (91/53 - 148/65)  RR: 18 (24 @ 08:06)  SpO2: 97% (24 @ 08:06)          TESTS & MEASUREMENTS:  Weight/BMI  101 (24 @ 12:35)    24 @ 07:01  -  08-10-24 @ 07:00  --------------------------------------------------------  IN: 0 mL / OUT: 1500 mL / NET: -1500 mL    08-10-24 @ 07:01  -  24 @ 07:00  --------------------------------------------------------  IN: 60 mL / OUT: 0 mL / NET: 60 mL                            6.8    5.82  )-----------( 191      ( 11 Aug 2024 07:01 )             23.1     PT/INR - ( 11 Aug 2024 07:01 )   PT: 12.00 sec;   INR: 1.05 ratio         PTT - ( 11 Aug 2024 07: )  PTT:31.3 sec  INR: 1.05 ratio (24 @ 07:01)        144  |  109  |  124<HH>  ----------------------------<  98  5.6<H>   |  23  |  2.8<H>    Ca    7.9<L>      11 Aug 2024 07:01  Phos  5.6       Mg     2.9         TPro  4.6<L>  /  Alb  2.6<L>  /  TBili  0.7  /  DBili  x   /  AST  36  /  ALT  24  /  AlkPhos  93      LIVER FUNCTIONS - ( 11 Aug 2024 07:01 )  Alb: 2.6 g/dL / Pro: 4.6 g/dL / ALK PHOS: 93 U/L / ALT: 24 U/L / AST: 36 U/L / GGT: x           CARDIAC MARKERS ( 11 Aug 2024 07:01 )  x     / x     / x     / x     / 3.8 ng/mL        Urinalysis Basic - ( 11 Aug 2024 07: )    Color: x / Appearance: x / SG: x / pH: x  Gluc: 98 mg/dL / Ketone: x  / Bili: x / Urobili: x   Blood: x / Protein: x / Nitrite: x   Leuk Esterase: x / RBC: x / WBC x   Sq Epi: x / Non Sq Epi: x / Bacteria: x          Ferritin: 170 ng/mL (24 @ 14:29)                    RADIOLOGY, ECG, & ADDITIONAL TESTS:  12 Lead ECG:   Ventricular Rate 78 BPM    Atrial Rate 78 BPM    P-R Interval 186 ms    QRS Duration 94 ms    Q-T Interval 438 ms    QTC Calculation(Bazett) 499 ms    P Axis 70 degrees    R Axis -42 degrees    T Axis -35 degrees    Diagnosis Line Sinus rhythm withfrequent Premature ventricular complexes  Left axis deviation  Incomplete right bundle branch block  Minimal voltage criteria for LVH, may be normal variant  ST & T wave abnormality, consider inferior ischemia  Abnormal ECG    Confirmed by El Zhao ([id number]) on 2024 6:30:32 PM (24 @ 16:05)    CT Abdomen and Pelvis No Cont:   ACC: [id number] EXAM:  CT ABDOMEN AND PELVIS   ORDERED BY: JHON MCKEON     PROCEDURE DATE:  2024          INTERPRETATION:  CLINICAL STATEMENT: Anemia. Evaluate for intra-abdominal   or retroperitoneal bleed.    TECHNIQUE: Contiguous axial CT images were obtained from the lower chest   to the pubic symphysis without intravenous contrast. Oral contrast was   not administered.  Reformatted images in the coronal and sagittal planes   were acquired.    COMPARISON CT: 2023    OTHER STUDIES USED FOR CORRELATION: None.      FINDINGS:  Limited evaluation of solid organs and vascular structures secondary to   lack of intravenous contrast.    LOWER CHEST: Hypodensity of the cardiac blood pool relative to the   ventricular wall, compatible with CT evidence of anemia. Partially imaged   right atrial/ventricular catheter. Aortic valve calcifications.   Cardiomegaly.    HEPATOBILIARY: Post cholecystectomy. Unremarkable liver.    SPLEEN: Unremarkable.    PANCREAS: Unremarkable.    ADRENALGLANDS: Unchanged 1.7 cm left adrenal gland nodule.    KIDNEYS: No nephroureteral calculi or hydronephrosis. Left renal cyst.    ABDOMINOPELVIC NODES: Unchanged scattered mildly prominent gastrohepatic   ligament lymph nodes.    PELVIC ORGANS: Small calcified uterine fibroid. Unremarkable urinary   bladder.    PERITONEUM/MESENTERY/BOWEL: Stool-filled distended rectum measuring 7.5   cm in diameter. Mild rectal wall thickening and presacral edema. No bowel   obstruction, ascites or pneumoperitoneum. Colonic diverticulosis, without   evidence for acute diverticulitis. Perigastric and splenorenal varices.   No evidence of an intraperitoneal or retroperitoneal hematoma. Metallic   density within the gastric fundus.    BONES/SOFT TISSUES: Degenerative changes of the spine noted. Avascular   necrosis of the right femoral head, stable. Degenerative changes of the   spine noted. Mild soft tissue anasarca.    OTHER: Scattered atherosclerotic vascular calcifications.      IMPRESSION:  1.  No evidence of an intraperitoneal or retroperitoneal hematoma; CT   evidence of anemia as above.  2.  Moderate rectal stool burden with associated rectal wall thickening   and adjacent edema. Findings may reflect developing stercoral proctitis.  3.  Additionalincidental findings as above.    --- End of Report ---            GERALDINE DAVIS MD; Attending Radiologist  This document has been electronically signed. Aug  9 2024  2:16PM (24 @ 11:43)  CT Head No Cont:   ACC: [id number] EXAM:  CT BRAIN   ORDERED BY: ENA LIRIANO     PROCEDURE DATE:  2024          INTERPRETATION:  Clinical History / Reason for exam: Weakness and   sleepiness.    Technique: Noncontrast head CT.  Contiguous CT axial images of the head   from the base to the vertex with coronal and sagittal reformats.    Comparison/correlation: CT head 2009    Findings:    The ventricles and cortical sulci are normal in size and configuration.    There is no acute intracranial hemorrhage, extra-axial fluid collection   or midline shift.  Gray-white matter differentiation is maintained.    The visualized paranasal sinuses and mastoids are clear.    IMPRESSION:    No evidence of acute intracranial pathology.    --- End of Report ---            GILLES PETERSEN MD; Attending Radiologist  This document has been electronically signed. Aug  8 2024  3:06PM (24 @ 14:44)    RECENT DIAGNOSTIC ORDERS:  Type + Screen: STAT (24 @ 08:35)  Packed Red Cells Order:  1 Unit  Indication: Hgb <7 gm/dL  Infuse Unit : 2 Hours (24 @ 08:11)  EEG:   Transport: Portable  Hemorrhage:No  Brain Death: No  MI:No  Sickle Cell:No   Stimulants:No  Anti-Convulsants:No   Anti-Depressants:No  Contr Substances:No (24 @ 08:02)  CT Brain Perfusion Maps Stroke: Urgent   Indication: Code Stroke  Transport: Stretcher-Crib (24 @ 07:29)  CT Angio Neck Stroke Protocol w/ IV Cont: Urgent   Indication: Code Stroke  Transport: Stretcher-Crib (24 @ 07:29)  CT Angio Brain Stroke Protocol  w/ IV Cont: Urgent   Indication: Code Stroke  Transport: Stretcher-Crib (24 @ 07:29)  Troponin T, High Sensitivity: STAT (24 @ 07:29)  CKMB Units: STAT (24 @ 07:29)  Creatine Kinase: STAT (24 @ 07:29)  Complete Blood Count + Automated Diff: STAT (24 @ 07:29)  Comprehensive Metabolic Panel: STAT (24 @ 07:29)  Diet, NPO:      Special Instructions for Nursing:  CODE STROKE; NPO until Dysphagia screen or Speech Bedside Swallow Evaluation completed and passed (24 @ 07:29)  Urinalysis: Routine (24 @ 07:06)  Culture - Blood: STAT  Specimen Source: Blood-Peripheral (24 @ 07:06)  12 Lead ECG (24 @ 07:04)  Troponin T, High Sensitivity: STAT (24 @ 07:04)  Comprehensive Metabolic Panel: STAT (24 @ 07:04)  Ammonia, Serum: STAT (24 @ 07:01)  Culture - Blood: STAT  Specimen Source: Blood-Peripheral (24 @ 06:55)  Blood Gas Venous - Lactate: STAT (24 @ 06:55)  Complete Blood Count + Automated Diff: STAT (24 @ 06:55)  Ammonia, Serum: AM Sched. Collection: 11-Aug-2024 04:30 (08-10-24 @ 17:07)  Magnesium: AM Sched. Collection: 12-Aug-2024 04:30 (08-10-24 @ 12:39)  Comprehensive Metabolic Panel: AM Sched. Collection: 12-Aug-2024 04:30 (08-10-24 @ 12:39)  Complete Blood Count + Automated Diff: AM Sched. Collection: 12-Aug-2024 04:30 (08-10-24 @ 12:39)      MEDICATIONS:  MEDICATIONS  (STANDING):  acetaminophen     Tablet .. 1000 milliGRAM(s) Oral every 8 hours  chlorhexidine 2% Cloths 1 Application(s) Topical daily  cyanocobalamin Injectable 1000 MICROGram(s) IntraMuscular daily  epoetin raquel (EPOGEN) Injectable 05013 Unit(s) SubCutaneous every 7 days  folic acid 1 milliGRAM(s) Oral daily  heparin   Injectable 5000 Unit(s) SubCutaneous every 12 hours  lactulose Syrup 20 Gram(s) Oral every 6 hours  lidocaine 1%/epinephrine 1:100,000 Inj 3 Vial(s) Local Injection once  metoprolol tartrate 12.5 milliGRAM(s) Oral every 12 hours  pantoprazole    Tablet 40 milliGRAM(s) Oral before breakfast  polyethylene glycol 3350 17 Gram(s) Oral daily  senna 2 Tablet(s) Oral at bedtime  sodium zirconium cyclosilicate 10 Gram(s) Oral daily    MEDICATIONS  (PRN):      HOME MEDICATIONS:  furosemide 40 mg oral tablet ()  metoprolol tartrate 25 mg oral tablet ()  pantoprazole 40 mg oral delayed release tablet ()      PHYSICAL EXAM:  GENERAL:   CHEST/LUNG:   HEART:   ABDOMEN:   EXTREMITIES:               LATRICIA ARREAGA  78y, Female  Allergy: aspirin (Rash; Other)  penicillins (Rash; Urticaria; Hives; Blisters)  latex (Unknown)  EKG leads (Hives)    Hospital Day: 13d    Patient seen and examined earlier today. events, labs and imaging noted    RRT/ Code stroke called braden , Pt is in comma , vitals stable, mainting air way , labs, immaging reviewed and discussed with the patient son over the phone ( i called him and he wanted 10 minutes to think so i called him back)  and the patient sister as conference meeting , they agreed to go with the CTA head and neck despite the kidney function after eplaining the risks of contrast. also I discussed multiple times with nephrology attending today to try HD given rising of her BUN and ammonia - nephro agreed to start HD , Her son came  at bedside stated that he wants to wait for her sister to come first to discuss , finally he agreed to start HD, i called vascular to place [facility] STAT      PMH/PSH:  PAST MEDICAL & SURGICAL HISTORY:  HTN (hypertension)      Heart murmur      Eczema      Anemia  iron infusions and PRBC transfusions      Aortic stenosis      Chronic kidney disease (CKD)      2019 novel coronavirus disease (COVID-19)  2020- REHAB admission      Gastric AVM      H/O appendicitis      S/P cholecystectomy      History of esophagogastroduodenoscopy (EGD)      H/O colonoscopy      H/O  section      History of appendectomy      Port-A-Cath in place  right CW          LAST 24-Hr EVENTS:    VITALS:  T(F): 97.6 (24 @ 08:06), Max: 98.4 (24 @ 00:20)  HR: 63 (24 @ 08:06)  BP: 106/41 (24 @ 08:06) (91/53 - 148/65)  RR: 18 (24 @ 08:06)  SpO2: 97% (24 @ 08:06)          TESTS & MEASUREMENTS:  Weight/BMI  101 (24 @ 12:35)    24 @ 07:01  -  08-10-24 @ 07:00  --------------------------------------------------------  IN: 0 mL / OUT: 1500 mL / NET: -1500 mL    08-10-24 @ 07:01  -  24 @ 07:00  --------------------------------------------------------  IN: 60 mL / OUT: 0 mL / NET: 60 mL                            6.8    5.82  )-----------( 191      ( 11 Aug 2024 07:01 )             23.1     PT/INR - ( 11 Aug 2024 07: )   PT: 12.00 sec;   INR: 1.05 ratio         PTT - ( 11 Aug 2024 07: )  PTT:31.3 sec  INR: 1.05 ratio (24 @ 07:01)        144  |  109  |  124<HH>  ----------------------------<  98  5.6<H>   |  23  |  2.8<H>    Ca    7.9<L>      11 Aug 2024 07:  Phos  5.6       Mg     2.9         TPro  4.6<L>  /  Alb  2.6<L>  /  TBili  0.7  /  DBili  x   /  AST  36  /  ALT  24  /  AlkPhos  93  11    LIVER FUNCTIONS - ( 11 Aug 2024 07: )  Alb: 2.6 g/dL / Pro: 4.6 g/dL / ALK PHOS: 93 U/L / ALT: 24 U/L / AST: 36 U/L / GGT: x           CARDIAC MARKERS ( 11 Aug 2024 07: )  x     / x     / x     / x     / 3.8 ng/mL        Urinalysis Basic - ( 11 Aug 2024 07: )    Color: x / Appearance: x / SG: x / pH: x  Gluc: 98 mg/dL / Ketone: x  / Bili: x / Urobili: x   Blood: x / Protein: x / Nitrite: x   Leuk Esterase: x / RBC: x / WBC x   Sq Epi: x / Non Sq Epi: x / Bacteria: x          Ferritin: 170 ng/mL (24 @ 14:29)                    RADIOLOGY, ECG, & ADDITIONAL TESTS:  12 Lead ECG:   Ventricular Rate 78 BPM    Atrial Rate 78 BPM    P-R Interval 186 ms    QRS Duration 94 ms    Q-T Interval 438 ms    QTC Calculation(Bazett) 499 ms    P Axis 70 degrees    R Axis -42 degrees    T Axis -35 degrees    Diagnosis Line Sinus rhythm withfrequent Premature ventricular complexes  Left axis deviation  Incomplete right bundle branch block  Minimal voltage criteria for LVH, may be normal variant  ST & T wave abnormality, consider inferior ischemia  Abnormal ECG    Confirmed by El Zhao ([id number]) on 2024 6:30:32 PM (24 @ 16:05)    CT Abdomen and Pelvis No Cont:   ACC: [id number] EXAM:  CT ABDOMEN AND PELVIS   ORDERED BY: JHON MCKEON     PROCEDURE DATE:  2024          INTERPRETATION:  CLINICAL STATEMENT: Anemia. Evaluate for intra-abdominal   or retroperitoneal bleed.    TECHNIQUE: Contiguous axial CT images were obtained from the lower chest   to the pubic symphysis without intravenous contrast. Oral contrast was   not administered.  Reformatted images in the coronal and sagittal planes   were acquired.    COMPARISON CT: 2023    OTHER STUDIES USED FOR CORRELATION: None.      FINDINGS:  Limited evaluation of solid organs and vascular structures secondary to   lack of intravenous contrast.    LOWER CHEST: Hypodensity of the cardiac blood pool relative to the   ventricular wall, compatible with CT evidence of anemia. Partially imaged   right atrial/ventricular catheter. Aortic valve calcifications.   Cardiomegaly.    HEPATOBILIARY: Post cholecystectomy. Unremarkable liver.    SPLEEN: Unremarkable.    PANCREAS: Unremarkable.    ADRENALGLANDS: Unchanged 1.7 cm left adrenal gland nodule.    KIDNEYS: No nephroureteral calculi or hydronephrosis. Left renal cyst.    ABDOMINOPELVIC NODES: Unchanged scattered mildly prominent gastrohepatic   ligament lymph nodes.    PELVIC ORGANS: Small calcified uterine fibroid. Unremarkable urinary   bladder.    PERITONEUM/MESENTERY/BOWEL: Stool-filled distended rectum measuring 7.5   cm in diameter. Mild rectal wall thickening and presacral edema. No bowel   obstruction, ascites or pneumoperitoneum. Colonic diverticulosis, without   evidence for acute diverticulitis. Perigastric and splenorenal varices.   No evidence of an intraperitoneal or retroperitoneal hematoma. Metallic   density within the gastric fundus.    BONES/SOFT TISSUES: Degenerative changes of the spine noted. Avascular   necrosis of the right femoral head, stable. Degenerative changes of the   spine noted. Mild soft tissue anasarca.    OTHER: Scattered atherosclerotic vascular calcifications.      IMPRESSION:  1.  No evidence of an intraperitoneal or retroperitoneal hematoma; CT   evidence of anemia as above.  2.  Moderate rectal stool burden with associated rectal wall thickening   and adjacent edema. Findings may reflect developing stercoral proctitis.  3.  Additionalincidental findings as above.    --- End of Report ---            GERALDINE DAVIS MD; Attending Radiologist  This document has been electronically signed. Aug  9 2024  2:16PM (24 @ 11:43)  CT Head No Cont:   ACC: [id number] EXAM:  CT BRAIN   ORDERED BY: ENA LIRIANO     PROCEDURE DATE:  2024          INTERPRETATION:  Clinical History / Reason for exam: Weakness and   sleepiness.    Technique: Noncontrast head CT.  Contiguous CT axial images of the head   from the base to the vertex with coronal and sagittal reformats.    Comparison/correlation: CT head 2009    Findings:    The ventricles and cortical sulci are normal in size and configuration.    There is no acute intracranial hemorrhage, extra-axial fluid collection   or midline shift.  Gray-white matter differentiation is maintained.    The visualized paranasal sinuses and mastoids are clear.    IMPRESSION:    No evidence of acute intracranial pathology.    --- End of Report ---            GILLES PETERSEN MD; Attending Radiologist  This document has been electronically signed. Aug  8 2024  3:06PM (24 @ 14:44)    RECENT DIAGNOSTIC ORDERS:  Type + Screen: STAT (24 @ 08:35)  Packed Red Cells Order:  1 Unit  Indication: Hgb <7 gm/dL  Infuse Unit : 2 Hours (24 @ 08:11)  EEG:   Transport: Portable  Hemorrhage:No  Brain Death: No  MI:No  Sickle Cell:No   Stimulants:No  Anti-Convulsants:No   Anti-Depressants:No  Contr Substances:No (24 @ 08:02)  CT Brain Perfusion Maps Stroke: Urgent   Indication: Code Stroke  Transport: Stretcher-Crib (24 @ 07:29)  CT Angio Neck Stroke Protocol w/ IV Cont: Urgent   Indication: Code Stroke  Transport: Stretcher-Crib (24 @ 07:29)  CT Angio Brain Stroke Protocol  w/ IV Cont: Urgent   Indication: Code Stroke  Transport: Stretcher-Crib (24 @ 07:29)  Troponin T, High Sensitivity: STAT (24 @ 07:29)  CKMB Units: STAT (24 @ 07:29)  Creatine Kinase: STAT (24 @ 07:29)  Complete Blood Count + Automated Diff: STAT (24 @ 07:29)  Comprehensive Metabolic Panel: STAT (24 @ 07:29)  Diet, NPO:      Special Instructions for Nursing:  CODE STROKE; NPO until Dysphagia screen or Speech Bedside Swallow Evaluation completed and passed (24 @ 07:29)  Urinalysis: Routine (24 @ 07:06)  Culture - Blood: STAT  Specimen Source: Blood-Peripheral (24 @ 07:06)  12 Lead ECG (24 @ 07:04)  Troponin T, High Sensitivity: STAT (24 @ 07:04)  Comprehensive Metabolic Panel: STAT (24 @ 07:04)  Ammonia, Serum: STAT (24 @ 07:01)  Culture - Blood: STAT  Specimen Source: Blood-Peripheral (24 @ 06:55)  Blood Gas Venous - Lactate: STAT (24 @ 06:55)  Complete Blood Count + Automated Diff: STAT (24 @ 06:55)  Ammonia, Serum: AM Sched. Collection: 11-Aug-2024 04:30 (08-10-24 @ 17:07)  Magnesium: AM Sched. Collection: 12-Aug-2024 04:30 (08-10-24 @ 12:39)  Comprehensive Metabolic Panel: AM Sched. Collection: 12-Aug-2024 04:30 (08-10-24 @ 12:39)  Complete Blood Count + Automated Diff: AM Sched. Collection: 12-Aug-2024 04:30 (08-10-24 @ 12:39)      MEDICATIONS:  MEDICATIONS  (STANDING):  acetaminophen     Tablet .. 1000 milliGRAM(s) Oral every 8 hours  chlorhexidine 2% Cloths 1 Application(s) Topical daily  cyanocobalamin Injectable 1000 MICROGram(s) IntraMuscular daily  epoetin raquel (EPOGEN) Injectable 60655 Unit(s) SubCutaneous every 7 days  folic acid 1 milliGRAM(s) Oral daily  heparin   Injectable 5000 Unit(s) SubCutaneous every 12 hours  lactulose Syrup 20 Gram(s) Oral every 6 hours  lidocaine 1%/epinephrine 1:100,000 Inj 3 Vial(s) Local Injection once  metoprolol tartrate 12.5 milliGRAM(s) Oral every 12 hours  pantoprazole    Tablet 40 milliGRAM(s) Oral before breakfast  polyethylene glycol 3350 17 Gram(s) Oral daily  senna 2 Tablet(s) Oral at bedtime  sodium zirconium cyclosilicate 10 Gram(s) Oral daily    MEDICATIONS  (PRN):      HOME MEDICATIONS:  furosemide 40 mg oral tablet ()  metoprolol tartrate 25 mg oral tablet ()  pantoprazole 40 mg oral delayed release tablet ()      PHYSICAL EXAM:  General:  obtunded, not responding to painfull, non verbal, pale   Lungs:  clear to ausculation b/l, normal resp effort, Port-A-Cath in place, no tender or erythema or discharge   Heart: regular ryhthm   Abdomen: soft, non tender non distended + BS   Ext: + edema,

## 2024-08-11 NOTE — PROGRESS NOTE ADULT - NS ATTEST RISK PROBLEM GEN_ALL_CORE FT
worsening medical condition , patient mental status, sever metabolic encephalopathy , GOC, upgrade to higher level of care

## 2024-08-11 NOTE — PROGRESS NOTE ADULT - ASSESSMENT
78y Female with PMHx of CKD, HTN, CCY, gout, severe AS, and anemia, admitted to the hospital for a rupture pressure ulcer on the buttock. Neurology currently following for increased drowsiness, AMS, and generalized weakness secondary to metabolic encephalopathy 2/2 worsening kidney function. Pt went to bed at her new baseline per primary team at 9pm yesterday, woke up at 6am to get her medications when the bedside nurse noticed the pt was not following commands, nonverbal, not withdrawing from painful stimuli in any extremities. Stroke code called on 4B. NIHSS 16 due to pt being unresponsive to physical stimuli and movement of any extremity. No seizure activity noted by primary team. CTH negative.  78y Female with PMHx of CKD, HTN, CCY, gout, severe AS, and anemia, admitted to the hospital for a rupture pressure ulcer on the buttock. Neurology currently following for increased drowsiness, AMS, and generalized weakness secondary to metabolic encephalopathy 2/2 worsening kidney function. Pt went to bed at her new baseline per primary team at 9pm yesterday, woke up at 6am to get her medications when the bedside nurse noticed the pt was not following commands, nonverbal, not withdrawing from painful stimuli in any extremities. Stroke code called on 4B. NIHSS 16 due to pt being unresponsive to physical stimuli and movement of any extremity. No seizure activity noted by primary team. CTH/CTA head and neck/CTP negative.     Impression: 79 y/o F with worsening encephalopathy likely secondary to toxic metabolic vs seizure.     RECS:  -REEG  -Repeat CTH tomorrow AM   -If mental status does not improve, consider vEEG  -Correct electrolytes   -Medical management per primary team     Discussed with Dr. Reynaga  78y Female with PMHx of CKD, HTN, CCY, gout, severe AS, and anemia, admitted to the hospital for a rupture pressure ulcer on the buttock. Neurology currently following for increased drowsiness, AMS, and generalized weakness secondary to metabolic encephalopathy 2/2 worsening kidney function. Pt went to bed at her new baseline per primary team at 9pm yesterday, woke up at 6am to get her medications when the bedside nurse noticed the pt was not following commands, nonverbal, not withdrawing from painful stimuli in any extremities. Stroke code called on 4B. NIHSS 16 due to pt being unresponsive to physical stimuli and movement of any extremity. No seizure activity noted by primary team. CTH/CTA head and neck with incidental 2mm aneurysm but no LVO/CTP negative.     Impression: 79 y/o F with worsening encephalopathy likely secondary to toxic metabolic vs seizure.     RECS:  -REEG  -Repeat CTH tomorrow AM   -If mental status does not improve, consider vEEG  -Correct electrolytes   -Neuroendovascular f/u outpatient for incidental aneurysm on CTA   -Medical management per primary team     Discussed with Dr. Reynaga

## 2024-08-12 ENCOUNTER — APPOINTMENT (OUTPATIENT)
Dept: GASTROENTEROLOGY | Facility: CLINIC | Age: 79
End: 2024-08-12

## 2024-08-12 LAB
ALBUMIN SERPL ELPH-MCNC: 2.7 G/DL — LOW (ref 3.5–5.2)
ALP SERPL-CCNC: 94 U/L — SIGNIFICANT CHANGE UP (ref 30–115)
ALT FLD-CCNC: 22 U/L — SIGNIFICANT CHANGE UP (ref 0–41)
AMMONIA BLD-MCNC: 139 UMOL/L — HIGH (ref 11–55)
ANION GAP SERPL CALC-SCNC: 10 MMOL/L — SIGNIFICANT CHANGE UP (ref 7–14)
ANION GAP SERPL CALC-SCNC: 15 MMOL/L — HIGH (ref 7–14)
AST SERPL-CCNC: 42 U/L — HIGH (ref 0–41)
BILIRUB SERPL-MCNC: 1.1 MG/DL — SIGNIFICANT CHANGE UP (ref 0.2–1.2)
BUN SERPL-MCNC: 96 MG/DL — CRITICAL HIGH (ref 10–20)
BUN SERPL-MCNC: 98 MG/DL — CRITICAL HIGH (ref 10–20)
CALCIUM SERPL-MCNC: 8.3 MG/DL — LOW (ref 8.4–10.5)
CALCIUM SERPL-MCNC: 8.4 MG/DL — SIGNIFICANT CHANGE UP (ref 8.4–10.5)
CHLORIDE SERPL-SCNC: 109 MMOL/L — SIGNIFICANT CHANGE UP (ref 98–110)
CHLORIDE SERPL-SCNC: 110 MMOL/L — SIGNIFICANT CHANGE UP (ref 98–110)
CO2 SERPL-SCNC: 21 MMOL/L — SIGNIFICANT CHANGE UP (ref 17–32)
CO2 SERPL-SCNC: 26 MMOL/L — SIGNIFICANT CHANGE UP (ref 17–32)
CREAT SERPL-MCNC: 2.5 MG/DL — HIGH (ref 0.7–1.5)
CREAT SERPL-MCNC: 2.5 MG/DL — HIGH (ref 0.7–1.5)
EGFR: 19 ML/MIN/1.73M2 — LOW
EGFR: 19 ML/MIN/1.73M2 — LOW
GLUCOSE SERPL-MCNC: 77 MG/DL — SIGNIFICANT CHANGE UP (ref 70–99)
GLUCOSE SERPL-MCNC: 90 MG/DL — SIGNIFICANT CHANGE UP (ref 70–99)
MAGNESIUM SERPL-MCNC: 2.8 MG/DL — HIGH (ref 1.8–2.4)
MRSA PCR RESULT.: NEGATIVE — SIGNIFICANT CHANGE UP
PHOSPHATE SERPL-MCNC: 6 MG/DL — HIGH (ref 2.1–4.9)
POTASSIUM SERPL-MCNC: 5 MMOL/L — SIGNIFICANT CHANGE UP (ref 3.5–5)
POTASSIUM SERPL-MCNC: 5.4 MMOL/L — HIGH (ref 3.5–5)
POTASSIUM SERPL-SCNC: 5 MMOL/L — SIGNIFICANT CHANGE UP (ref 3.5–5)
POTASSIUM SERPL-SCNC: 5.4 MMOL/L — HIGH (ref 3.5–5)
PROT SERPL-MCNC: 5.2 G/DL — LOW (ref 6–8)
SODIUM SERPL-SCNC: 145 MMOL/L — SIGNIFICANT CHANGE UP (ref 135–146)
SODIUM SERPL-SCNC: 146 MMOL/L — SIGNIFICANT CHANGE UP (ref 135–146)
TROPONIN T, HIGH SENSITIVITY RESULT: 125 NG/L — CRITICAL HIGH (ref 6–13)

## 2024-08-12 PROCEDURE — 70450 CT HEAD/BRAIN W/O DYE: CPT | Mod: 26

## 2024-08-12 PROCEDURE — 74018 RADEX ABDOMEN 1 VIEW: CPT | Mod: 26

## 2024-08-12 PROCEDURE — 99223 1ST HOSP IP/OBS HIGH 75: CPT

## 2024-08-12 PROCEDURE — 93970 EXTREMITY STUDY: CPT | Mod: 26

## 2024-08-12 PROCEDURE — 99233 SBSQ HOSP IP/OBS HIGH 50: CPT

## 2024-08-12 PROCEDURE — 93010 ELECTROCARDIOGRAM REPORT: CPT

## 2024-08-12 PROCEDURE — 95816 EEG AWAKE AND DROWSY: CPT | Mod: 26

## 2024-08-12 RX ORDER — LACTULOSE 10 G
20 PACKET (EA) ORAL EVERY 6 HOURS
Refills: 0 | Status: DISCONTINUED | OUTPATIENT
Start: 2024-08-12 | End: 2024-08-23

## 2024-08-12 RX ORDER — LACTULOSE 10 G
20 PACKET (EA) ORAL
Refills: 0 | Status: DISCONTINUED | OUTPATIENT
Start: 2024-08-12 | End: 2024-08-12

## 2024-08-12 RX ADMIN — Medication 2 TABLET(S): at 21:04

## 2024-08-12 RX ADMIN — Medication 5000 UNIT(S): at 05:19

## 2024-08-12 RX ADMIN — ACETAMINOPHEN 1000 MILLIGRAM(S): 325 TABLET ORAL at 21:04

## 2024-08-12 RX ADMIN — Medication 20 GRAM(S): at 17:11

## 2024-08-12 RX ADMIN — Medication 5000 UNIT(S): at 17:11

## 2024-08-12 RX ADMIN — CHLORHEXIDINE GLUCONATE 1 APPLICATION(S): 40 SOLUTION TOPICAL at 11:03

## 2024-08-12 RX ADMIN — Medication 1000 MICROGRAM(S): at 11:10

## 2024-08-12 RX ADMIN — ACETAMINOPHEN 1000 MILLIGRAM(S): 325 TABLET ORAL at 21:34

## 2024-08-12 RX ADMIN — ACETAMINOPHEN 1000 MILLIGRAM(S): 325 TABLET ORAL at 06:00

## 2024-08-12 NOTE — PROGRESS NOTE ADULT - ASSESSMENT
78-year-old female with past medical history of   CKD, HTN, CCY, gout,  severe AS being planned for TAVR, and history of severe anemia secondary to chronic upper GI bleeding due to  AV malformation gastric body dieulafoy lesion, weekly blood transfusions presenting to ER for a rupture pressure ulcer of the buttock area.  #EVAN on CKD4/ anemia   #AMS  - creat improved to baseline / BUN noted/ doubt reason for change in mental stataus  - pt is obtunded, neuro on board, s/p stroke code   - hold on HD today/ will assess daily   - keep holding  diuretics  - no hydro on renal/bladder sono   - culver / monitor urine output  - phos noted; monitor  - start midodrine 10mg q8h if sys BP < 100  - lokelma 10 q 24 if repeated k > 5.5  - cont WOODY wkly  - neuro f/u   - overall prognosis poor / palliative care evaluation   will follow

## 2024-08-12 NOTE — PROGRESS NOTE ADULT - ASSESSMENT
78-year-old female with past medical history of   CKD, HTN, CCY, gout,  severe AS being planned for tAVR, and history of severe anemia secondary to chronic upper GI bleeding due to  AV malformation gastric body dieulafoy lesion, weekly blood transfusions presenting to ER for a rupture pressure ulcer of the buttock area. Hospital course complicated by worsening encephalopathy s/p urgent HD, upgraded to SDU    Worsening metabolic encephalopathy - possibly uremic?  Acute kidney inury on CKD stage 4,   Elevated CK 2700 - improved   Hyperkalemia - resolved  - per documentation, patient wqith worsening mental status since 8/9, s/p RRT and code stroke 8/11  - ammonia >200/TSH N/CTH no acute process  - CTAP done and reported-  No evidence of an intraperitoneal or retroperitoneal hematoma; CT evidence of anemia as above. Moderate rectal stool burden with associated rectal wall thickening and adjacent edema. Findings may reflect developing stercoral proctitis.  - patient started on Lactulose--> 3BM overnight  - s/p urgent HD 8/11 via [facility], nephrology following, no HD today  - c/w Lokelma daily  - mental status now improving, patient lethargic, responds to name  - currently no evidence of infection, monitor off abx  - patient refused NGT placement. Recall s/s given improvement in mental status    Constipation  - CT with significant stool burden, possible developing stercoral colitis  - had 3 BM overnight, c/w bowel regimen. Please document BM     Bilateral buttocks and sacrum deep Tissue Injury  - ID following: hold antibiotics -  wounds without any gross signs of infection    - c/w wound care per burn recs  - fu burn if bedside debridement indicated     Acute on chronic  anemia  History of AVMs (gastric and duodenal) and Dieulafoy Lesions Status Post Hemostasis in 2023  History of perisplenic Varices in 2023   - s/p EGD/colonoscopy/push enteroscopy on 7/22 showing AVMs and polyps  - s/p 1U pRBC on 8/2, s/p course of venofer  - on weekly IV transfusion/iron infusions per hematology  - monitor h/h, keep active T&S  - OP fu with GI    Chronic diastolic CHF  Severe aortic stenosis   -  TTE Echo Complete w/o Contrast w/ Doppler (08.01.24 @ 11:03) >EF of 56 %. Mild asymmetric left ventricular hypertrophy. Grade II diastolic dysfunction). Severe aortic valve stenosis , Mild to moderate mitral valve regurgitation. Moderate aortic regurgitation.  - Patient to follow up with Dr. Hernandez as an outpatient for TAVR  - c/w metoprolol, lasix  - avoid fluid overload  - OP fu with cardio Dr Hernandez     Thrombocytopenia- resolved    GOC : DNR/ DNI with NIV  PLease discuss all with son Domenico as he's the HCP    Overall prognosis is guarded, high risk of decompensation, continue monitoring in SDU    Patient seen at bedside, total time spent to evaluate and treat the patient's acute illness and chronic medical conditions as well as time spent reviewing prior records, labs, radiology, documenting in electronic medical records,  discussing medical plan with   medical team was more than 50 minutes with >50% of time spent face to face with patient, discussing with patient/family as well as coordination of care

## 2024-08-12 NOTE — PROGRESS NOTE ADULT - SUBJECTIVE AND OBJECTIVE BOX
seen and examined  24 h events noted   no distress   lying comfortable        PAST HISTORY  --------------------------------------------------------------------------------  No significant changes to PMH, PSH, FHx, SHx, unless otherwise noted    ALLERGIES & MEDICATIONS  --------------------------------------------------------------------------------  Allergies    aspirin (Rash; Other)  penicillins (Rash; Urticaria; Hives; Blisters)  latex (Unknown)  EKG leads (Hives)    Intolerances      Standing Inpatient Medications  acetaminophen     Tablet .. 1000 milliGRAM(s) Oral every 8 hours  chlorhexidine 2% Cloths 1 Application(s) Topical daily  cyanocobalamin Injectable 1000 MICROGram(s) IntraMuscular daily  epoetin raquel (EPOGEN) Injectable 95728 Unit(s) SubCutaneous every 7 days  folic acid 1 milliGRAM(s) Oral daily  heparin   Injectable 5000 Unit(s) SubCutaneous every 12 hours  lactulose Syrup 20 Gram(s) Oral every 6 hours  lidocaine 1%/epinephrine 1:100,000 Inj 2 Vial(s) Local Injection once  lidocaine 1%/epinephrine 1:100,000 Inj 3 Vial(s) Local Injection once  metoprolol tartrate 12.5 milliGRAM(s) Oral every 12 hours  pantoprazole    Tablet 40 milliGRAM(s) Oral before breakfast  polyethylene glycol 3350 17 Gram(s) Oral daily  senna 2 Tablet(s) Oral at bedtime  sodium zirconium cyclosilicate 10 Gram(s) Oral daily    PRN Inpatient Medications      VITALS/PHYSICAL EXAM  --------------------------------------------------------------------------------  T(C): 36.7 (08-12-24 @ 04:00), Max: 36.9 (08-12-24 @ 00:00)  HR: 77 (08-12-24 @ 04:00) (67 - 84)  BP: 111/51 (08-12-24 @ 04:00) (101/46 - 132/63)  RR: 18 (08-12-24 @ 08:09) (18 - 19)  SpO2: 98% (08-12-24 @ 08:09) (98% - 100%)  Wt(kg): --        08-11-24 @ 07:01  -  08-12-24 @ 07:00  --------------------------------------------------------  IN: 0 mL / OUT: 1650 mL / NET: -1650 mL      Physical Exam:  	Gen: NAD  	Pulm: decrease BS B/L  	CV: S1S2; no rub  	Abd: +distended      LABS/STUDIES  --------------------------------------------------------------------------------              8.4    6.55  >-----------<  182      [08-11-24 @ 21:38]              27.4     145  |  109  |  96  ----------------------------<  90      [08-12-24 @ 03:55]  5.0   |  26  |  2.5        Ca     8.3     [08-12-24 @ 03:55]      Mg     2.9     [08-11-24 @ 07:01]      Phos  5.6     [08-11-24 @ 07:01]    TPro  4.6  /  Alb  2.6  /  TBili  0.7  /  DBili  x   /  AST  36  /  ALT  24  /  AlkPhos  93  [08-11-24 @ 07:01]    PT/INR: PT 12.00, INR 1.05       [08-11-24 @ 07:01]  PTT: 31.3       [08-11-24 @ 07:01]    Creatinine Trend:  SCr 2.5 [08-12 @ 03:55]  SCr 2.4 [08-11 @ 21:38]  SCr 2.8 [08-11 @ 07:01]  SCr 2.7 [08-09 @ 17:47]  SCr 2.6 [08-09 @ 07:43]    Urinalysis - [08-12-24 @ 03:55]      Color  / Appearance  / SG  / pH       Gluc 90 / Ketone   / Bili  / Urobili        Blood  / Protein  / Leuk Est  / Nitrite       RBC  / WBC  / Hyaline  / Gran  / Sq Epi  / Non Sq Epi  / Bacteria       Iron 23, TIBC 163, %sat 14      [08-01-24 @ 14:29]  Ferritin 170      [08-01-24 @ 14:29]  TSH 4.36      [08-08-24 @ 09:40]

## 2024-08-12 NOTE — PROGRESS NOTE ADULT - SUBJECTIVE AND OBJECTIVE BOX
LATRICIA ARREAGA  78y Female    CHIEF COMPLAINT:    Patient is a 78y old  Female who presents with a chief complaint of AMS (12 Aug 2024 11:24)    INTERVAL HPI/OVERNIGHT EVENTS:    Patient seen and examined. No acute events overnight. s/p HD yesterday, 3BM overnight, mental status improving     ROS: All other systems are negative.    Vital Signs:    T(F): 97.6 (24 @ 08:26), Max: 98.5 (24 @ 00:00)  HR: 70 (24 @ 08:26) (70 - 84)  BP: 95/59 (24 @ 08:26) (95/59 - 132/63)  RR: 18 (24 @ 08:26) (18 - 19)  SpO2: 100% (24 @ 08:26) (98% - 100%)    11 Aug 2024 07:01  -  12 Aug 2024 07:00  --------------------------------------------------------  IN: 0 mL / OUT: 1650 mL / NET: -1650 mL    12 Aug 2024 07:01  -  12 Aug 2024 14:40  --------------------------------------------------------  IN: 0 mL / OUT: 500 mL / NET: -500 mL    Daily Weight in k.5 (12 Aug 2024 00:00)    PHYSICAL EXAM:    GENERAL:  NAD chronically ill appearing   SKIN: No rashes or lesions  HEENT: Atraumatic. Normocephalic.    NECK: Supple, No JVD.    PULMONARY: Coarse breath sounds  B/L. No wheezing. No rales  CVS: Normal S1, S2. Rate and Rhythm are regular.    ABDOMEN/GI: Soft, Nontender, Nondistended   MSK:  No clubbing or cyanosis   NEUROLOGIC: able to follow simple commands   PSYCH: sleepy, arousable, oriented to self     Consultant(s) Notes Reviewed:  [x ] YES  [ ] NO  Care Discussed with Consultants/Other Providers [ x] YES  [ ] NO    LABS:                        8.4    6.55  )-----------( 182      ( 11 Aug 2024 21:38 )             27.4     146  |  110  |  98<HH>  ----------------------------<  77  5.4<H>   |  21  |  2.5<H>    Ca    8.4      12 Aug 2024 06:30  Phos  6.0     08-12  Mg     2.8     08-12    TPro  5.2<L>  /  Alb  2.7<L>  /  TBili  1.1  /  DBili  x   /  AST  42<H>  /  ALT  22  /  AlkPhos  94  08-12    PT/INR - ( 11 Aug 2024 07:01 )   PT: 12.00 sec;   INR: 1.05 ratio      PTT - ( 11 Aug 2024 07:01 )  PTT:31.3 sec    Trop --, CKMB 3.8, CK --, 24 @ 07:01    RADIOLOGY & ADDITIONAL TESTS:  Imaging or report Personally Reviewed:  [x] YES  [ ] NO  EKG reviewed: [x] YES  [ ] NO    Medications:  Standing  acetaminophen     Tablet .. 1000 milliGRAM(s) Oral every 8 hours  chlorhexidine 2% Cloths 1 Application(s) Topical daily  cyanocobalamin Injectable 1000 MICROGram(s) IntraMuscular daily  epoetin raquel (EPOGEN) Injectable 19870 Unit(s) SubCutaneous every 7 days  folic acid 1 milliGRAM(s) Oral daily  heparin   Injectable 5000 Unit(s) SubCutaneous every 12 hours  lactulose Syrup 20 Gram(s) Oral every 6 hours  lidocaine 1%/epinephrine 1:100,000 Inj 2 Vial(s) Local Injection once  lidocaine 1%/epinephrine 1:100,000 Inj 3 Vial(s) Local Injection once  metoprolol tartrate 12.5 milliGRAM(s) Oral every 12 hours  pantoprazole    Tablet 40 milliGRAM(s) Oral before breakfast  polyethylene glycol 3350 17 Gram(s) Oral daily  senna 2 Tablet(s) Oral at bedtime  sodium zirconium cyclosilicate 10 Gram(s) Oral daily    PRN Meds

## 2024-08-12 NOTE — PROGRESS NOTE ADULT - ASSESSMENT
78-year-old female with past medical history of CKD, HTN, CCY, gout,  severe AS being planned for tavr and history of severe anemia secondary to chronic upper GI bleeding due to  AV malformation gastric body dieulafoy lesion, weekly blood transfusions presenting to ER for a rupture pressure ulcer of the buttock area after sitting on toilet, which cracked, for 4 hours. By the time EMS arrived skin had ulcerated, with a blister that ruptured predominantly to the left buttock area (appears to be like burn blister that ruptured) with linear abrasions to the bilateral upper thighs/buttocks.     #AMS/sleepy from 8/6 as per nursing staff   - ABG wnl / TSH 4.36 / CT HEAD negative for acute    - no fever no leukocytosis   - CTAP: No evidence of an intraperitoneal or retroperitoneal hematoma; CT evidence of anemia as above. Moderate rectal stool burden with associated rectal wall thickening and adjacent edema. Findings may reflect developing stercoral proctitis.  - CT PERFUSION: No perfusion deficits to suggest areas of completed infarction or at risk territory.   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 No large vessel occlusion  or vascular malformation.   - Neurology consult appreciated - ammonia level is elevated 206   - c/w lactulose 20g q6  - udall placed for potential HD, holding off for now per nephro  - place NGT today, son gave consent today   - s/p 1u rbc yesterday, hgb stable  - f/u trend trop, daily labs  - UCx negative, f/u BCx 8/11  - Neuro checks     # Bilateral buttocks and sacrum deep tissue Injury  - wound team input appreciated (Bilateral buttocks and sacrum region (mimicking toilet seat) )   - ID input appreciated , hold antibiotics -  wounds without any gross signs of infection    - Cleanse wound to bilateral buttocks and sacrum with Vashe wound cleanser. Pat dry, apply Silvadene, cover with Xeroform and abdominal pad twice a day and prn for soiling.   - nutritional support   - Burn team consult-plan for bedside debridement of necrotic tissue    # Acute kidney inury on CKD stage 4, prerenal  # Rhabdomyolysis- resolved  # Hyperkalemia   - Creatine Kinase normalized   - US Kidney and Bladder 07.30.24  >No definite hydronephrosis  - S/p IV fluids  - nephro following   - monitor UO  - monitor BMP  - low k diet, monitor k  - Creatinine 2.5 stable from yesterday  - udall placed but will hold off HD for now, nephro following daily    # Peripheral neuropathy  - could be secondary to B12 deficiency   - IV Tylenol as needed   - c/w B12, folate supplement   - evaluated by PT : STR  - X RAY R ankle - Bony demineralization without acute fracture or malalignment.    # Acute on chronic anemia  # Non erosive gastritis  # Severe diverticulosis  # Grade/Stage I external hemorrhoids  # H/o  AVMs  # Colon polyps  # History of perisplenic Varices in 2023   - b12 and folate deficiency- c/w supplement    -  EGD-Colonoscopy (07.22.24) >Erythema in the stomach compatible with non-erosive gastritis. 2 non-bleeding 7mm AVMs were seen at the second portion of the duodenum cauterization was successfully applied to ensurehemostasis. Polyps (5 mm) in the ascending colon. (Polypectomy). Polyps (10 mm) in the ascending colon. (Polypectomy). Polyp (10 mm) in the ascending colon. (Polypectomy, Endoclip). 3 non-bleeding AVMs ranging in size between 7-10mm were seen in the cecum.Using APC probe, cauterization was successfully performed to ensure hemostasis. 3 non-bleeding AVMs ranging in size between 7-105 mm were seen in the ascending colon. Using APC probe, cauterization was successfully performed to ensure hemostasis. Severe diverticulosis of the the left side of the colon. Grade/Stage I external hemorrhoids.  - c/w protonix  - s/p 1U pRBC on 8/2   - completed course  IV Venofer - f/u with nephro for  WOODY - start WOODY   - c/w b12 and folate replacement for vit b12 and folate deficiency  - op hematology and GI    - keep active type and screen     # Chronic diastolic CHF  # Severe aortic stenosis   -  TTE Echo Complete w/o Contrast w/ Doppler (08.01.24) >EF of 56 %. Mild asymmetric left ventricular hypertrophy. Grade II diastolic dysfunction). Severe aortic valve stenosis , Mild to moderate mitral valve regurgitation. Moderate aortic regurgitation.  - Patient to follow up with Dr. Hernandez as an outpatient for TAVR  - c/w metoprolol, holding lasix    GOC : DNR/ DNI with NIV  palliative team consult - attending confirmed 8/11 from the patient's son Mr. Acuña that she is DNR/DNI     DVT ppx: heparin   GI ppx: protoxin   Diet: NPO   Code: DNI/DNR  78-year-old female with past medical history of CKD, HTN, CCY, gout,  severe AS being planned for tavr and history of severe anemia secondary to chronic upper GI bleeding due to  AV malformation gastric body dieulafoy lesion, weekly blood transfusions presenting to ER for a rupture pressure ulcer of the buttock area after sitting on toilet, which cracked, for 4 hours. By the time EMS arrived skin had ulcerated, with a blister that ruptured predominantly to the left buttock area (appears to be like burn blister that ruptured) with linear abrasions to the bilateral upper thighs/buttocks.     #AMS/sleepy from 8/6 as per nursing staff   - ABG wnl / TSH 4.36 / CT HEAD negative for acute    - no fever no leukocytosis   - CTAP: No evidence of an intraperitoneal or retroperitoneal hematoma; CT evidence of anemia as above. Moderate rectal stool burden with associated rectal wall thickening and adjacent edema. Findings may reflect developing stercoral proctitis.  - CT PERFUSION: No perfusion deficits to suggest areas of completed infarction or at risk territory.   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 No large vessel occlusion  or vascular malformation.   - Neurology consult appreciated - ammonia level is elevated 206   - c/w lactulose 20g q6  - udall placed for potential HD, holding off for now per nephro  - place NGT today, son gave consent today   - s/p 1u rbc yesterday, hgb stable  - f/u trend trop, daily labs  - UCx negative, f/u BCx 8/11  - repeat CT head today  - Neuro checks     # Bilateral buttocks and sacrum deep tissue Injury  - wound team input appreciated (Bilateral buttocks and sacrum region (mimicking toilet seat) )   - ID input appreciated , hold antibiotics -  wounds without any gross signs of infection    - Cleanse wound to bilateral buttocks and sacrum with Vashe wound cleanser. Pat dry, apply Silvadene, cover with Xeroform and abdominal pad twice a day and prn for soiling.   - nutritional support   - Burn team consult-plan for bedside debridement of necrotic tissue    # Acute kidney inury on CKD stage 4, prerenal  # Rhabdomyolysis- resolved  # Hyperkalemia   - Creatine Kinase normalized   - US Kidney and Bladder 07.30.24  >No definite hydronephrosis  - S/p IV fluids  - nephro following   - monitor UO  - monitor BMP  - low k diet, monitor k  - Creatinine 2.5 stable from yesterday  - udall placed but will hold off HD for now, nephro following daily    # Peripheral neuropathy  - could be secondary to B12 deficiency   - IV Tylenol as needed   - c/w B12, folate supplement   - evaluated by PT : STR  - X RAY R ankle - Bony demineralization without acute fracture or malalignment.    # Acute on chronic anemia  # Non erosive gastritis  # Severe diverticulosis  # Grade/Stage I external hemorrhoids  # H/o  AVMs  # Colon polyps  # History of perisplenic Varices in 2023   - b12 and folate deficiency- c/w supplement    -  EGD-Colonoscopy (07.22.24) >Erythema in the stomach compatible with non-erosive gastritis. 2 non-bleeding 7mm AVMs were seen at the second portion of the duodenum cauterization was successfully applied to ensurehemostasis. Polyps (5 mm) in the ascending colon. (Polypectomy). Polyps (10 mm) in the ascending colon. (Polypectomy). Polyp (10 mm) in the ascending colon. (Polypectomy, Endoclip). 3 non-bleeding AVMs ranging in size between 7-10mm were seen in the cecum.Using APC probe, cauterization was successfully performed to ensure hemostasis. 3 non-bleeding AVMs ranging in size between 7-105 mm were seen in the ascending colon. Using APC probe, cauterization was successfully performed to ensure hemostasis. Severe diverticulosis of the the left side of the colon. Grade/Stage I external hemorrhoids.  - c/w protonix  - s/p 1U pRBC on 8/2   - completed course  IV Venofer - f/u with nephro for  WOODY - start WOODY   - c/w b12 and folate replacement for vit b12 and folate deficiency  - op hematology and GI    - keep active type and screen     # Chronic diastolic CHF  # Severe aortic stenosis   -  TTE Echo Complete w/o Contrast w/ Doppler (08.01.24) >EF of 56 %. Mild asymmetric left ventricular hypertrophy. Grade II diastolic dysfunction). Severe aortic valve stenosis , Mild to moderate mitral valve regurgitation. Moderate aortic regurgitation.  - Patient to follow up with Dr. Hernandez as an outpatient for TAVR  - c/w metoprolol, holding lasix    GOC : DNR/ DNI with NIV  palliative team consult - attending confirmed 8/11 from the patient's son Mr. Acuña that she is DNR/DNI     DVT ppx: heparin   GI ppx: protoxin   Diet: NPO   Code: DNI/DNR  78-year-old female with past medical history of CKD, HTN, CCY, gout,  severe AS being planned for tavr and history of severe anemia secondary to chronic upper GI bleeding due to  AV malformation gastric body dieulafoy lesion, weekly blood transfusions presenting to ER for a rupture pressure ulcer of the buttock area after sitting on toilet, which cracked, for 4 hours. By the time EMS arrived skin had ulcerated, with a blister that ruptured predominantly to the left buttock area (appears to be like burn blister that ruptured) with linear abrasions to the bilateral upper thighs/buttocks.     #AMS/sleepy from 8/6 as per nursing staff   - ABG wnl / TSH 4.36 / CT HEAD negative for acute    - no fever no leukocytosis   - CTAP: No evidence of an intraperitoneal or retroperitoneal hematoma; CT evidence of anemia as above. Moderate rectal stool burden with associated rectal wall thickening and adjacent edema. Findings may reflect developing stercoral proctitis.  - CT PERFUSION: No perfusion deficits to suggest areas of completed infarction or at risk territory.   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 No large vessel occlusion  or vascular malformation.   - Neurology consult appreciated - ammonia level is elevated 206   - c/w lactulose 20g q6  - udall placed for potential HD, holding off for now per nephro  - speech swallow eval; consider NG tube if cannot tolerate po  - s/p 1u rbc yesterday, hgb stable  - f/u trend trop, daily labs  - UCx negative, f/u BCx 8/11  - repeat CT head today  - Neuro checks     # Bilateral buttocks and sacrum deep tissue Injury  - wound team input appreciated (Bilateral buttocks and sacrum region (mimicking toilet seat) )   - ID input appreciated , hold antibiotics -  wounds without any gross signs of infection    - Cleanse wound to bilateral buttocks and sacrum with Vashe wound cleanser. Pat dry, apply Silvadene, cover with Xeroform and abdominal pad twice a day and prn for soiling.   - nutritional support   - Burn team consult-plan for bedside debridement of necrotic tissue    # Acute kidney inury on CKD stage 4, prerenal  # Rhabdomyolysis- resolved  # Hyperkalemia   - Creatine Kinase normalized   - US Kidney and Bladder 07.30.24  >No definite hydronephrosis  - S/p IV fluids  - nephro following   - monitor UO  - monitor BMP  - low k diet, monitor k  - Creatinine 2.5 stable from yesterday  - udall placed but will hold off HD for now, nephro following daily    # Peripheral neuropathy  - could be secondary to B12 deficiency   - IV Tylenol as needed   - c/w B12, folate supplement   - evaluated by PT : STR  - X RAY R ankle - Bony demineralization without acute fracture or malalignment.    # Acute on chronic anemia  # Non erosive gastritis  # Severe diverticulosis  # Grade/Stage I external hemorrhoids  # H/o  AVMs  # Colon polyps  # History of perisplenic Varices in 2023   - b12 and folate deficiency- c/w supplement    -  EGD-Colonoscopy (07.22.24) >Erythema in the stomach compatible with non-erosive gastritis. 2 non-bleeding 7mm AVMs were seen at the second portion of the duodenum cauterization was successfully applied to ensurehemostasis. Polyps (5 mm) in the ascending colon. (Polypectomy). Polyps (10 mm) in the ascending colon. (Polypectomy). Polyp (10 mm) in the ascending colon. (Polypectomy, Endoclip). 3 non-bleeding AVMs ranging in size between 7-10mm were seen in the cecum.Using APC probe, cauterization was successfully performed to ensure hemostasis. 3 non-bleeding AVMs ranging in size between 7-105 mm were seen in the ascending colon. Using APC probe, cauterization was successfully performed to ensure hemostasis. Severe diverticulosis of the the left side of the colon. Grade/Stage I external hemorrhoids.  - c/w protonix  - s/p 1U pRBC on 8/2   - completed course  IV Venofer - f/u with nephro for  WOODY - start WOODY   - c/w b12 and folate replacement for vit b12 and folate deficiency  - op hematology and GI    - keep active type and screen     # Chronic diastolic CHF  # Severe aortic stenosis   -  TTE Echo Complete w/o Contrast w/ Doppler (08.01.24) >EF of 56 %. Mild asymmetric left ventricular hypertrophy. Grade II diastolic dysfunction). Severe aortic valve stenosis , Mild to moderate mitral valve regurgitation. Moderate aortic regurgitation.  - Patient to follow up with Dr. Hernandez as an outpatient for TAVR  - c/w metoprolol, holding lasix    GOC : DNR/ DNI with NIV  palliative team consult - attending confirmed 8/11 from the patient's son Mr. Acuña that she is DNR/DNI     DVT ppx: heparin   GI ppx: protoxin   Diet: NPO   Code: DNI/DNR

## 2024-08-12 NOTE — EEG REPORT - NS EEG TEXT BOX
[facility] Department of Neurology         Inpatient Routine-Electroencephalography Report    Patient Name:	LATRICIA ARREAGA    :	1945  MRN:	-    Study Start Date/Time:	2024, 11:53:13 AM    End Time (if applicable):    Brief Clinical History:  LATRICIA ARREAGA is a 78 year old Female; study performed to investigate for seizures or markers of epilepsy.   Technologist notes: AMS, RENAL FAILURE, WEAKNESS, CKD, AORTIC STENOSIS, ANEMIA ECZEMA, HEART MURMUR, HTN    Diagnosis Code:  R56.9 convulsions/seizure  CPT:   67739 (awake/drowsy)     Pertinent Medication:  n/a    Acquisition Details:  Electroencephalography was acquired using a minimum of 21 channels on an [employer] system v 9.3.1 with electrode placement according to the standard International 10-20 system following ACNS (American Clinical Neurophysiology Society) guidelines.  Anterior temporal T1 and T2 electrodes were utilized whenever possible.  The XLTEK automated spike & seizure detections were all reviewed in detail, in addition to the entire raw EEG.    Findings:  Background:  continuous, with predominantly delta frequencies.  Generalized Slowing:  Continuous polymorphic delta  Symmetry/Focality: No persistent asymmetries of voltage or frequency.     Voltage:  Normal (20+ uV)  Organization:  Absent  Posterior Dominant Rhythm:  Absent   Sleep: Asleep   Variability:   Yes		Reactivity:  Yes    Spontaneous Activity:   - Occasional ( >1/hr < 1/min) GPDs   - Occasional, low amplitude sharp wave discharges in the left cento-temporal region.   Events:  1)	No electrographic seizures or significant clinical events occurred during this study.  Provocations:  •	Hyperventilation: was not performed.  •	Photic stimulation: was not performed.  FINAL Impression:  Abnormal awake  routine EEG  1)	 There was diffuse slowing of electrographic activity  2)	There were Diffuse GPDs   3)	There were less frequent left centro-temporal sharp wave discharges.     Final Clinical Correlation:  1)	There were indicators of Left central- temporal cortical irritability.   2)	Findings consistent with diffuse electrocerebral dysfunction and cortical irritability  secondary to metabolic  etiology    Adalgisa Barker MD   Attending Neurologist, [facility]

## 2024-08-12 NOTE — PROGRESS NOTE ADULT - SUBJECTIVE AND OBJECTIVE BOX
SUBJECTIVE/OVERNIGHT EVENTS  Today is hospital day 14d. This morning patient was seen and examined at bedside, resting comfortably in bed. No acute or major events overnight.    HOSPITAL COURSE  Day 1:   Day 2:   Day 3:     CODE STATUS:    FAMILY COMMUNICATION  Contact date:  Name of person contacted:  Relationship to patient:  Communication details:    MEDICATIONS  STANDING MEDICATIONS  acetaminophen     Tablet .. 1000 milliGRAM(s) Oral every 8 hours  chlorhexidine 2% Cloths 1 Application(s) Topical daily  cyanocobalamin Injectable 1000 MICROGram(s) IntraMuscular daily  epoetin raquel (EPOGEN) Injectable 01747 Unit(s) SubCutaneous every 7 days  folic acid 1 milliGRAM(s) Oral daily  heparin   Injectable 5000 Unit(s) SubCutaneous every 12 hours  lactulose Syrup 20 Gram(s) Oral every 6 hours  lidocaine 1%/epinephrine 1:100,000 Inj 2 Vial(s) Local Injection once  lidocaine 1%/epinephrine 1:100,000 Inj 3 Vial(s) Local Injection once  metoprolol tartrate 12.5 milliGRAM(s) Oral every 12 hours  pantoprazole    Tablet 40 milliGRAM(s) Oral before breakfast  polyethylene glycol 3350 17 Gram(s) Oral daily  senna 2 Tablet(s) Oral at bedtime  sodium zirconium cyclosilicate 10 Gram(s) Oral daily    PRN MEDICATIONS    VITALS  T(F): 97.6 (08-12-24 @ 08:26), Max: 98.5 (08-12-24 @ 00:00)  HR: 70 (08-12-24 @ 08:26) (67 - 84)  BP: 95/59 (08-12-24 @ 08:26) (95/59 - 132/63)  RR: 18 (08-12-24 @ 08:26) (18 - 19)  SpO2: 100% (08-12-24 @ 08:26) (98% - 100%)    PHYSICAL EXAM  GENERAL  (  ) NAD, lying in bed comfortably     (  ) obtunded     (  ) lethargic     (x  ) somnolent    HEAD  ( x ) Atraumatic     (  ) hematoma     (  ) laceration (specify location:       )     NECK  (  x) Supple     (  ) neck stiffness     (  ) nuchal rigidity     (  )  no JVD     (  ) JVD present ( -- cm)    HEART  Rate -->  (  x) normal rate    (  ) bradycardic    (  ) tachycardic  Rhythm -->  ( x ) regular    (  ) regularly irregular    (  ) irregularly irregular  Murmurs -->  ( x ) normal s1/s2    (  ) systolic murmur    (  ) diastolic murmur    (  ) continuous murmur     (  ) S3 present    (  ) S4 present    LUNGS  ( x )Unlabored respirations     (  ) tachypnea  (  ) B/L air entry     (  ) decreased breath sounds in:  (location     )    (  ) no adventitious sound     (  ) crackles     (  ) wheezing      (  ) rhonchi      (specify location:       )  (  ) chest wall tenderness (specify location:       )    ABDOMEN  (x  ) Soft     (  ) tense   |   (  ) nondistended     (  ) distended   |   (  ) +BS     (  ) hypoactive bowel sounds     (  ) hyperactive bowel sounds  (  ) nontender     (  ) RUQ tenderness     (  ) RLQ tenderness     (  ) LLQ tenderness     (  ) epigastric tenderness     (  ) diffuse tenderness  (  ) Splenomegaly      (  ) Hepatomegaly      (  ) Jaundice     (  ) ecchymosis     EXTREMITIES  (x  ) Normal     (  ) Rash     (  ) ecchymosis     (  ) varicose veins      (  ) pitting edema     (  ) non-pitting edema   (  ) ulceration     (  ) gangrene:     (location:     )    NERVOUS SYSTEM  ( x ) A&Ox1     (  ) confused     (  ) lethargic  CN II-XII:     (  ) Intact     (  ) focal deficits  (Specify:     )   Upper extremities:     (  ) strength X/5     (  ) focal deficit (specify:    )  Lower extremities:     (  ) strength  X/5    (  ) focal deficit (specify:    )    SKIN  (  ) No rashes or lesions     (  ) maculopapular rash     (  ) pustules     (  ) vesicles     (  ) ulcer     (  ) ecchymosis     (specify location:     )    (  ) Indwelling Arora Catheter   Date insterted:    Reason (  ) Critical illness     (  ) urinary retention    (  ) Accurate Ins/Outs Monitoring     (  ) CMO patient    (  ) Central Line  Date inserted:  Location: (  ) Right IJ   (  ) Left IJ   (  ) Right Fem   (  ) Left Fem    (  ) SPC  (  ) pigtail  (  ) PEG tube  (  ) colostomy  (  ) jejunostomy  (  ) U-Dall    LABS             8.4    6.55  )-----------( 182      ( 08-11-24 @ 21:38 )             27.4     146  |  110  |  98  -------------------------<  77   08-12-24 @ 06:30  5.4  |  21  |  2.5    Ca      8.4     08-12-24 @ 06:30  Phos   6.0     08-12-24 @ 06:30  Mg     2.8     08-12-24 @ 06:30    TPro  5.2  /  Alb  2.7  /  TBili  1.1  /  DBili  x   /  AST  42  /  ALT  22  /  AlkPhos  94  /  GGT  x     08-12-24 @ 06:30    PT/INR - ( 08-11-24 @ 07:01 )   PT: 12.00 sec;   INR: 1.05 ratio  PTT - ( 08-11-24 @ 07:01 )  PTT:31.3 sec    Troponin T, High Sensitivity Result: 142 ng/L (08-11-24 @ 21:38)  CKMB Units: 3.8 ng/mL (08-11-24 @ 07:01)  Troponin T, High Sensitivity Result: 108 ng/L (08-11-24 @ 07:01)    Urinalysis Basic - ( 12 Aug 2024 06:30 )    Color: x / Appearance: x / SG: x / pH: x  Gluc: 77 mg/dL / Ketone: x  / Bili: x / Urobili: x   Blood: x / Protein: x / Nitrite: x   Leuk Esterase: x / RBC: x / WBC x   Sq Epi: x / Non Sq Epi: x / Bacteria: x      ABG - ( 11 Aug 2024 07:00 )  pH, Arterial: 7.44  pH, Blood: x     /  pCO2: 37    /  pO2: 86    / HCO3: 25    / Base Excess: 1.1   /  SaO2: 97.8                IMAGING    CT Angio Brain Stroke Protocol  w/ IV Cont (08.11.24 @ 09:41)  IMPRESSION:    CT PERFUSION: No perfusion abnormality.    CTA HEAD/NECK: No evidence of acute large vessel occlusion. 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 Mild   scattered atheromatous changes as above.          Xray Kidney Ureter Bladder (08.12.24 @ 01:24)  IMPRESSION:    Moderate colonic stool. Nonspecific nonobstructive bowel gas pattern.   Degenerative changes in the spine, sacroiliac joints and bilateral hips.   Right femoral dialysis catheter tip projecting over the sacrum, likely   common iliac vein or IVC.     SUBJECTIVE/OVERNIGHT EVENTS  Today is hospital day 14d. This morning patient was seen and examined at bedside. Stroke code called yesterday d/t AMS. Family agreed to go with the CTA head and neck despite the kidney function after explaining the risks of contrast. UDall placed by vascular yesterday and had some bleeding around the UDall site. Had three BMs over night.    MEDICATIONS  STANDING MEDICATIONS  acetaminophen     Tablet .. 1000 milliGRAM(s) Oral every 8 hours  chlorhexidine 2% Cloths 1 Application(s) Topical daily  cyanocobalamin Injectable 1000 MICROGram(s) IntraMuscular daily  epoetin raquel (EPOGEN) Injectable 65626 Unit(s) SubCutaneous every 7 days  folic acid 1 milliGRAM(s) Oral daily  heparin   Injectable 5000 Unit(s) SubCutaneous every 12 hours  lactulose Syrup 20 Gram(s) Oral every 6 hours  lidocaine 1%/epinephrine 1:100,000 Inj 2 Vial(s) Local Injection once  lidocaine 1%/epinephrine 1:100,000 Inj 3 Vial(s) Local Injection once  metoprolol tartrate 12.5 milliGRAM(s) Oral every 12 hours  pantoprazole    Tablet 40 milliGRAM(s) Oral before breakfast  polyethylene glycol 3350 17 Gram(s) Oral daily  senna 2 Tablet(s) Oral at bedtime  sodium zirconium cyclosilicate 10 Gram(s) Oral daily    PRN MEDICATIONS    VITALS  T(F): 97.6 (08-12-24 @ 08:26), Max: 98.5 (08-12-24 @ 00:00)  HR: 70 (08-12-24 @ 08:26) (67 - 84)  BP: 95/59 (08-12-24 @ 08:26) (95/59 - 132/63)  RR: 18 (08-12-24 @ 08:26) (18 - 19)  SpO2: 100% (08-12-24 @ 08:26) (98% - 100%)    PHYSICAL EXAM  GENERAL  (  ) NAD, lying in bed comfortably     (  ) obtunded     (  ) lethargic     (  ) somnolent    HEAD  ( x ) Atraumatic     (  ) hematoma     (  ) laceration (specify location:       )     NECK  (  x) Supple     (  ) neck stiffness     (  ) nuchal rigidity     (  )  no JVD     (  ) JVD present ( -- cm)    HEART  Rate -->  (  x) normal rate    (  ) bradycardic    (  ) tachycardic  Rhythm -->  ( x ) regular    (  ) regularly irregular    (  ) irregularly irregular  Murmurs -->  ( x ) normal s1/s2    (  ) systolic murmur    (  ) diastolic murmur    (  ) continuous murmur     (  ) S3 present    (  ) S4 present    LUNGS  ( x )Unlabored respirations     (  ) tachypnea  (  ) B/L air entry     (  ) decreased breath sounds in:  (location     )    (  ) no adventitious sound     (  ) crackles     (  ) wheezing      (  ) rhonchi      (specify location:       )  (  ) chest wall tenderness (specify location:       )    ABDOMEN  (x  ) Soft     (  ) tense   |   (  ) nondistended     (  ) distended   |   (  ) +BS     (  ) hypoactive bowel sounds     (  ) hyperactive bowel sounds  (  ) nontender     (  ) RUQ tenderness     (  ) RLQ tenderness     (  ) LLQ tenderness     (  ) epigastric tenderness     (  ) diffuse tenderness  (  ) Splenomegaly      (  ) Hepatomegaly      (  ) Jaundice     (  ) ecchymosis     EXTREMITIES  (x  ) Normal     (  ) Rash     (  ) ecchymosis     (  ) varicose veins      (  ) pitting edema     (  ) non-pitting edema   (  ) ulceration     (  ) gangrene:     (location:     )    NERVOUS SYSTEM  ( x ) A&Ox1     (  ) confused     (  ) lethargic  CN II-XII:     (  ) Intact     (  ) focal deficits  (Specify:     )   Upper extremities:     (  ) strength X/5     (  ) focal deficit (specify:    )  Lower extremities:     (  ) strength  X/5    (  ) focal deficit (specify:    )    SKIN  (  ) No rashes or lesions     (  ) maculopapular rash     (  ) pustules     (  ) vesicles     (  ) ulcer     (  ) ecchymosis     (specify location:     )    (  ) Indwelling Arora Catheter   Date insterted:    Reason (  ) Critical illness     (  ) urinary retention    (  ) Accurate Ins/Outs Monitoring     (  ) CMO patient    (  ) Central Line  Date inserted:  Location: (  ) Right IJ   (  ) Left IJ   (  ) Right Fem   (  ) Left Fem    (  ) SPC  (  ) pigtail  (  ) PEG tube  (  ) colostomy  (  ) jejunostomy  (  ) U-Dall    LABS             8.4    6.55  )-----------( 182      ( 08-11-24 @ 21:38 )             27.4     146  |  110  |  98  -------------------------<  77   08-12-24 @ 06:30  5.4  |  21  |  2.5    Ca      8.4     08-12-24 @ 06:30  Phos   6.0     08-12-24 @ 06:30  Mg     2.8     08-12-24 @ 06:30    TPro  5.2  /  Alb  2.7  /  TBili  1.1  /  DBili  x   /  AST  42  /  ALT  22  /  AlkPhos  94  /  GGT  x     08-12-24 @ 06:30    PT/INR - ( 08-11-24 @ 07:01 )   PT: 12.00 sec;   INR: 1.05 ratio  PTT - ( 08-11-24 @ 07:01 )  PTT:31.3 sec    Troponin T, High Sensitivity Result: 142 ng/L (08-11-24 @ 21:38)  CKMB Units: 3.8 ng/mL (08-11-24 @ 07:01)  Troponin T, High Sensitivity Result: 108 ng/L (08-11-24 @ 07:01)    Urinalysis Basic - ( 12 Aug 2024 06:30 )    Color: x / Appearance: x / SG: x / pH: x  Gluc: 77 mg/dL / Ketone: x  / Bili: x / Urobili: x   Blood: x / Protein: x / Nitrite: x   Leuk Esterase: x / RBC: x / WBC x   Sq Epi: x / Non Sq Epi: x / Bacteria: x      ABG - ( 11 Aug 2024 07:00 )  pH, Arterial: 7.44  pH, Blood: x     /  pCO2: 37    /  pO2: 86    / HCO3: 25    / Base Excess: 1.1   /  SaO2: 97.8                IMAGING    CT Angio Brain Stroke Protocol  w/ IV Cont (08.11.24 @ 09:41)  IMPRESSION:    CT PERFUSION: No perfusion abnormality.    CTA HEAD/NECK: No evidence of acute large vessel occlusion. 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 Mild   scattered atheromatous changes as above.          Xray Kidney Ureter Bladder (08.12.24 @ 01:24)  IMPRESSION:    Moderate colonic stool. Nonspecific nonobstructive bowel gas pattern.   Degenerative changes in the spine, sacroiliac joints and bilateral hips.   Right femoral dialysis catheter tip projecting over the sacrum, likely   common iliac vein or IVC.

## 2024-08-12 NOTE — CONSULT NOTE ADULT - SUBJECTIVE AND OBJECTIVE BOX
Patient is a 78y old  Female who presents with a chief complaint of Pressure injury of skin     (10 Aug 2024 11:18)      HPI:  HPI : Patient is a 78-year-old female with past medical history of   CKD, HTN, CCY, gout,  severe AS being planned for tAVR, and history of severe anemia secondary to chronic upper GI bleeding due to  AV malformation gastric body dieulafoy lesion, weekly blood transfusions presenting to ER for a rupture pressure ulcer of the buttock area today.   Patient states that last night was on her toilet which cracked while she was sitting on it, and patient was unable to get off the toilet for 4 hours.  By the time EMS arrived skin had ulcerated, with a blister that ruptured predominantly to the left buttock area (appears to be like burn blister that ruptured) with linear abrasions to the bilateral upper thighs/buttocks.  Patient in so much pain that she cannot stand or walk, Of note, pt was recently admitted to the hospital between - for hematochezia, s/p 1 unit prbc. Denies fever, chills. N/V, abd pain.     Vital Signs Last 24 Hrs  T(C): 36.4 (2024 00:51), Max: 37.1 (2024 18:30)  T(F): 97.6 (2024 00:51), Max: 98.7 (2024 18:30)  HR: 65 (2024 00:51) (59 - 65)  BP: 146/69 (2024 00:51) (112/46 - 146/69)  BP(mean): --  RR: 18 (2024 00:51) (18 - 18)  SpO2: 99% (2024 00:51) (95% - 99%)    Parameters below as of 2024 00:51  Patient On (Oxygen Delivery Method): room air    Labs significant for wbc 11k , hgb 9.1 , Cr 3.5 (baseline 2-3)   s/p  1L NS in ED   Admitted for wound management and placement as pt can not care for her own ADLs. (2024 03:46)      PAST MEDICAL & SURGICAL HISTORY:  HTN (hypertension)      Heart murmur      Eczema      Anemia  iron infusions and PRBC transfusions      Aortic stenosis      Chronic kidney disease (CKD)      2019 novel coronavirus disease (COVID-19)  2020- REHAB admission      Gastric AVM      H/O appendicitis      S/P cholecystectomy      History of esophagogastroduodenoscopy (EGD)      H/O colonoscopy      H/O  section      History of appendectomy      Port-A-Cath in place  right CW        FAMILY HISTORY:  FH: kidney disease (Father)    FH: breast cancer (Mother)    FH: multiple myeloma (Mother)    :  No known cardiovacular family hisotry     Review Of Systems:     All ROS are negative except per HPI       Allergies    aspirin (Rash; Other)  penicillins (Rash; Urticaria; Hives; Blisters)  latex (Unknown)  EKG leads (Hives)    Intolerances          PHYSICAL EXAM    ICU Vital Signs Last 24 Hrs  T(C): 36.4 (12 Aug 2024 08:26), Max: 36.9 (12 Aug 2024 00:00)  T(F): 97.6 (12 Aug 2024 08:26), Max: 98.5 (12 Aug 2024 00:00)  HR: 70 (12 Aug 2024 08:26) (67 - 84)  BP: 95/59 (12 Aug 2024 08:26) (95/59 - 132/63)  BP(mean): 67 (12 Aug 2024 08:26) (67 - 90)  ABP: --  ABP(mean): --  RR: 18 (12 Aug 2024 08:26) (18 - 19)  SpO2: 100% (12 Aug 2024 08:26) (98% - 100%)    O2 Parameters below as of 12 Aug 2024 08:26  Patient On (Oxygen Delivery Method): room air    General:  obtunded, not responding to painfull, non verbal, pale   Lungs:  clear to ausculation b/l, normal resp effort, Port-A-Cath in place, no tender or erythema or discharge   Heart: regular ryhthm   Abdomen: soft, non tender non distended + BS   Ext: + edema,       24 @ 07:01  -  24 @ 07:00  --------------------------------------------------------  IN:  Total IN: 0 mL    OUT:    Indwelling Catheter - Urethral (mL): 1150 mL    Other (mL): 500 mL  Total OUT: 1650 mL    Total NET: -1650 mL      LABS:                          8.4    6.55  )-----------( 182      ( 11 Aug 2024 21:38 )             27.4                                               08-12    145  |  109  |  96<HH>  ----------------------------<  90  5.0   |  26  |  2.5<H>    Ca    8.3<L>      12 Aug 2024 03:55  Phos  5.6     08-11  Mg     2.9     08-11    TPro  4.6<L>  /  Alb  2.6<L>  /  TBili  0.7  /  DBili  x   /  AST  36  /  ALT  24  /  AlkPhos  93  08-11      PT/INR - ( 11 Aug 2024 07:01 )   PT: 12.00 sec;   INR: 1.05 ratio         PTT - ( 11 Aug 2024 07:01 )  PTT:31.3 sec                                       Urinalysis Basic - ( 12 Aug 2024 03:55 )    Color: x / Appearance: x / SG: x / pH: x  Gluc: 90 mg/dL / Ketone: x  / Bili: x / Urobili: x   Blood: x / Protein: x / Nitrite: x   Leuk Esterase: x / RBC: x / WBC x   Sq Epi: x / Non Sq Epi: x / Bacteria: x        CARDIAC MARKERS ( 11 Aug 2024 07:01 )  x     / x     / x     / x     / 3.8 ng/mL                                            LIVER FUNCTIONS - ( 11 Aug 2024 07:01 )  Alb: 2.6 g/dL / Pro: 4.6 g/dL / ALK PHOS: 93 U/L / ALT: 24 U/L / AST: 36 U/L / GGT: x                                                                                                                                   ABG - ( 11 Aug 2024 07:00 )  pH, Arterial: 7.44  pH, Blood: x     /  pCO2: 37    /  pO2: 86    / HCO3: 25    / Base Excess: 1.1   /  SaO2: 97.8            MEDICATIONS  (STANDING):  acetaminophen     Tablet .. 1000 milliGRAM(s) Oral every 8 hours  chlorhexidine 2% Cloths 1 Application(s) Topical daily  cyanocobalamin Injectable 1000 MICROGram(s) IntraMuscular daily  epoetin raquel (EPOGEN) Injectable 01946 Unit(s) SubCutaneous every 7 days  folic acid 1 milliGRAM(s) Oral daily  heparin   Injectable 5000 Unit(s) SubCutaneous every 12 hours  lactulose Syrup 20 Gram(s) Oral every 6 hours  lidocaine 1%/epinephrine 1:100,000 Inj 3 Vial(s) Local Injection once  lidocaine 1%/epinephrine 1:100,000 Inj 2 Vial(s) Local Injection once  metoprolol tartrate 12.5 milliGRAM(s) Oral every 12 hours  pantoprazole    Tablet 40 milliGRAM(s) Oral before breakfast  polyethylene glycol 3350 17 Gram(s) Oral daily  senna 2 Tablet(s) Oral at bedtime  sodium zirconium cyclosilicate 10 Gram(s) Oral daily    MEDICATIONS  (PRN):   Patient is a 78y old  Female who presents with a chief complaint of Pressure injury of skin    78-year-old female with past medical history of   CKD, HTN, CCY, gout,  severe AS being planned for tAVR, and history of severe anemia secondary to chronic upper GI bleeding due to  AV malformation gastric body dieulafoy lesion, weekly blood transfusions presenting to ER for a rupture pressure ulcer of the buttock area today.   Patient states that last night was on her toilet which cracked while she was sitting on it, and patient was unable to get off the toilet for 4 hours.  By the time EMS arrived skin had ulcerated, with a blister that ruptured predominantly to the left buttock area (appears to be like burn blister that ruptured) with linear abrasions to the bilateral upper thighs/buttocks.  Patient in so much pain that she cannot stand or walk, Of note, pt was recently admitted to the hospital between - for hematochezia, s/p 1 unit prbc. Denies fever, chills. N/V, abd pain.     Vital Signs Last 24 Hrs  T(C): 36.4 (2024 00:51), Max: 37.1 (2024 18:30)  T(F): 97.6 (2024 00:51), Max: 98.7 (2024 18:30)  HR: 65 (2024 00:51) (59 - 65)  BP: 146/69 (2024 00:51) (112/46 - 146/69)  BP(mean): --  RR: 18 (2024 00:51) (18 - 18)  SpO2: 99% (2024 00:51) (95% - 99%)    Parameters below as of 2024 00:51  Patient On (Oxygen Delivery Method): room air    Labs significant for wbc 11k , hgb 9.1 , Cr 3.5 (baseline 2-3)   s/p  1L NS in ED   Admitted for wound management and placement as pt can not care for her own ADLs. (2024 03:46)    RRT/ Code stroke called on () as pt was altered and unresponsive. Vitals were stable, maintained air way, labs, imaging reviewed and discussed with the patient son and sister over the phone (by attending). Family agreed to go with the CTA head and neck despite the kidney function after explaining the risks of contrast. Will try for HD given rising of her BUN and ammonia - nephro agreed to start HD. Vascular consulted to place [place] upgraded to SDU    PAST MEDICAL & SURGICAL HISTORY:  HTN (hypertension)      Heart murmur      Eczema      Anemia  iron infusions and PRBC transfusions      Aortic stenosis      Chronic kidney disease (CKD)       novel coronavirus disease (COVID-19)  2020- RE[facility] admission      Gastric AVM      H/O appendicitis      S/P cholecystectomy      History of esophagogastroduodenoscopy (EGD)      H/O colonoscopy      H/O  section      History of appendectomy      Port-A-Cath in place  right CW        FAMILY HISTORY:  FH: kidney disease (Father)    FH: breast cancer (Mother)    FH: multiple myeloma (Mother)    :  No known cardiovacular family hisotry     Review Of Systems:     All ROS are negative except per HPI       Allergies    aspirin (Rash; Other)  penicillins (Rash; Urticaria; Hives; Blisters)  latex (Unknown)  EKG leads (Hives)    Intolerances          PHYSICAL EXAM    ICU Vital Signs Last 24 Hrs  T(C): 36.4 (12 Aug 2024 08:26), Max: 36.9 (12 Aug 2024 00:00)  T(F): 97.6 (12 Aug 2024 08:26), Max: 98.5 (12 Aug 2024 00:00)  HR: 70 (12 Aug 2024 08:26) (67 - 84)  BP: 95/59 (12 Aug 2024 08:26) (95/59 - 132/63)  BP(mean): 67 (12 Aug 2024 08:) (67 - 90)  RR: 18 (12 Aug 2024 08:26) (18 - 19)  SpO2: 100% (12 Aug 2024 08:26) (98% - 100%)    O2 Parameters below as of 12 Aug 2024 08:26  Patient On (Oxygen Delivery Method): room air    General:  obtunded, not responding to painful non verbal, pale   Lungs:  clear to ausculation b/l, normal resp effort, Port-A-Cath in place, no tender or erythema or discharge   Heart: KATHYA   Abdomen: soft, non tender non distended + BS   Ext: + edema,       24 @ 07:01  -  24 @ 07:00  --------------------------------------------------------  IN:  Total IN: 0 mL    OUT:    Indwelling Catheter - Urethral (mL): 1150 mL    Other (mL): 500 mL  Total OUT: 1650 mL    Total NET: -1650 mL      LABS:                          8.4    6.55  )-----------( 182      ( 11 Aug 2024 21:38 )             27.4                                               08-12    145  |  109  |  96<HH>  ----------------------------<  90  5.0   |  26  |  2.5<H>    Ca    8.3<L>      12 Aug 2024 03:55  Phos  5.6     08-11  Mg     2.9     08-11    TPro  4.6<L>  /  Alb  2.6<L>  /  TBili  0.7  /  DBili  x   /  AST  36  /  ALT  24  /  AlkPhos  93  08-11      PT/INR - ( 11 Aug 2024 07:01 )   PT: 12.00 sec;   INR: 1.05 ratio         PTT - ( 11 Aug 2024 07:01 )  PTT:31.3 sec                                       Urinalysis Basic - ( 12 Aug 2024 03:55 )    Color: x / Appearance: x / SG: x / pH: x  Gluc: 90 mg/dL / Ketone: x  / Bili: x / Urobili: x   Blood: x / Protein: x / Nitrite: x   Leuk Esterase: x / RBC: x / WBC x   Sq Epi: x / Non Sq Epi: x / Bacteria: x        CARDIAC MARKERS ( 11 Aug 2024 07:01 )  x     / x     / x     / x     / 3.8 ng/mL                                            LIVER FUNCTIONS - ( 11 Aug 2024 07:01 )  Alb: 2.6 g/dL / Pro: 4.6 g/dL / ALK PHOS: 93 U/L / ALT: 24 U/L / AST: 36 U/L / GGT: x                                                                                                                                   ABG - ( 11 Aug 2024 07:00 )  pH, Arterial: 7.44  pH, Blood: x     /  pCO2: 37    /  pO2: 86    / HCO3: 25    / Base Excess: 1.1   /  SaO2: 97.8            MEDICATIONS  (STANDING):  acetaminophen     Tablet .. 1000 milliGRAM(s) Oral every 8 hours  chlorhexidine 2% Cloths 1 Application(s) Topical daily  cyanocobalamin Injectable 1000 MICROGram(s) IntraMuscular daily  epoetin raquel (EPOGEN) Injectable 43331 Unit(s) SubCutaneous every 7 days  folic acid 1 milliGRAM(s) Oral daily  heparin   Injectable 5000 Unit(s) SubCutaneous every 12 hours  lactulose Syrup 20 Gram(s) Oral every 6 hours  lidocaine 1%/epinephrine 1:100,000 Inj 3 Vial(s) Local Injection once  lidocaine 1%/epinephrine 1:100,000 Inj 2 Vial(s) Local Injection once  metoprolol tartrate 12.5 milliGRAM(s) Oral every 12 hours  pantoprazole    Tablet 40 milliGRAM(s) Oral before breakfast  polyethylene glycol 3350 17 Gram(s) Oral daily  senna 2 Tablet(s) Oral at bedtime  sodium zirconium cyclosilicate 10 Gram(s) Oral daily

## 2024-08-12 NOTE — CONSULT NOTE ADULT - ASSESSMENT
IMPRESSION:  -AMS  -H/O CKD  -severe AS plan for TAVR   -B/L buttock wound   -Uremia  -Hyperkalemia-resolved       PLAN:      CNS: avoid sedation, neuro on board, f/u CT, EEG     HEENT: Oral care    PULMONARY:  HOB @ 45 degrees. Aspiration precautions, wean oxygen    CARDIOVASCULAR: avoid volume overload, MAP adequate, trend troponin    GI: GI prophylaxis.  Feeding. Ensure BM, on lactulose, f/u ammonia      RENAL:  Follow up lytes. Correct as needed, nephro on board, plan for HD for uremia     INFECTIOUS DISEASE: Follow up cultures, procalcitonin, off ABX, f/u burn for debridment     HEMATOLOGICAL:  DVT prophylaxis. DIMER    ENDOCRINE:  Follow up FS.  Insulin protocol if needed.    MUSCULOSKELETAL: ambulate as tolerated\      SDU             IMPRESSION:    -AMS sp stroke code/ head CT noted  -H/O CKD  -severe AS plan for TAVR   -B/L buttock wound   -Uremia SP HD  -Hyperkalemia-resolved       PLAN:      CNS: avoid sedation, neuro on board, f/u EEG     HEENT: Oral care    PULMONARY:  HOB @ 45 degrees. Aspiration precautions, wean oxygen, keep SaO2 92 TO 96%    CARDIOVASCULAR: avoid volume overload, MAP adequate, trend troponin    GI: GI prophylaxis.  Feeding. Ensure BM, on lactulose, f/u ammonia      RENAL:  Follow up lytes. Correct as needed, nephro on board, plan for HD for uremia     INFECTIOUS DISEASE: Follow up cultures, procalcitonin, f/u burn for debridment     HEMATOLOGICAL:  DVT prophylaxis    ENDOCRINE:  Follow up FS.  Insulin protocol if needed.    MUSCULOSKELETAL: ambulate as tolerated\      SDU

## 2024-08-13 LAB
HBV CORE AB SER-ACNC: SIGNIFICANT CHANGE UP
HBV CORE IGM SER-ACNC: SIGNIFICANT CHANGE UP
HBV SURFACE AB SER-ACNC: SIGNIFICANT CHANGE UP
HBV SURFACE AG SER-ACNC: SIGNIFICANT CHANGE UP

## 2024-08-13 PROCEDURE — 99232 SBSQ HOSP IP/OBS MODERATE 35: CPT

## 2024-08-13 PROCEDURE — 99233 SBSQ HOSP IP/OBS HIGH 50: CPT

## 2024-08-13 RX ORDER — MIDODRINE HYDROCHLORIDE 5 MG/1
10 TABLET ORAL THREE TIMES A DAY
Refills: 0 | Status: DISCONTINUED | OUTPATIENT
Start: 2024-08-13 | End: 2024-08-23

## 2024-08-13 RX ADMIN — CHLORHEXIDINE GLUCONATE 1 APPLICATION(S): 40 SOLUTION TOPICAL at 11:02

## 2024-08-13 RX ADMIN — Medication 1 MILLIGRAM(S): at 11:01

## 2024-08-13 RX ADMIN — ACETAMINOPHEN 1000 MILLIGRAM(S): 325 TABLET ORAL at 21:42

## 2024-08-13 RX ADMIN — Medication 20 GRAM(S): at 00:12

## 2024-08-13 RX ADMIN — METOPROLOL TARTRATE 12.5 MILLIGRAM(S): 100 TABLET ORAL at 05:25

## 2024-08-13 RX ADMIN — ACETAMINOPHEN 1000 MILLIGRAM(S): 325 TABLET ORAL at 05:25

## 2024-08-13 RX ADMIN — Medication 5000 UNIT(S): at 17:27

## 2024-08-13 RX ADMIN — Medication 40 MILLIGRAM(S): at 05:25

## 2024-08-13 RX ADMIN — Medication 1000 MICROGRAM(S): at 11:00

## 2024-08-13 RX ADMIN — Medication 20 GRAM(S): at 11:01

## 2024-08-13 RX ADMIN — Medication 2 TABLET(S): at 21:18

## 2024-08-13 RX ADMIN — Medication 5000 UNIT(S): at 05:25

## 2024-08-13 RX ADMIN — Medication 20 GRAM(S): at 05:25

## 2024-08-13 RX ADMIN — SODIUM ZIRCONIUM CYCLOSILICATE 10 GRAM(S): 5 POWDER, FOR SUSPENSION ORAL at 11:03

## 2024-08-13 RX ADMIN — MIDODRINE HYDROCHLORIDE 10 MILLIGRAM(S): 5 TABLET ORAL at 11:00

## 2024-08-13 RX ADMIN — POLYETHYLENE GLYCOL 3350 17 GRAM(S): 17 POWDER, FOR SOLUTION ORAL at 11:01

## 2024-08-13 RX ADMIN — ACETAMINOPHEN 1000 MILLIGRAM(S): 325 TABLET ORAL at 21:17

## 2024-08-13 RX ADMIN — Medication 500 MILLILITER(S): at 17:13

## 2024-08-13 RX ADMIN — MIDODRINE HYDROCHLORIDE 10 MILLIGRAM(S): 5 TABLET ORAL at 17:03

## 2024-08-13 RX ADMIN — Medication 20 GRAM(S): at 17:27

## 2024-08-13 NOTE — PROGRESS NOTE ADULT - SUBJECTIVE AND OBJECTIVE BOX
seen and examined  24 h events noted   PAST HISTORY  --------------------------------------------------------------------------------  No significant changes to PMH, PSH, FHx, SHx, unless otherwise noted    ALLERGIES & MEDICATIONS  --------------------------------------------------------------------------------  Allergies    aspirin (Rash; Other)  penicillins (Rash; Urticaria; Hives; Blisters)  latex (Unknown)  EKG leads (Hives)    Intolerances      Standing Inpatient Medications  acetaminophen     Tablet .. 1000 milliGRAM(s) Oral every 8 hours  chlorhexidine 2% Cloths 1 Application(s) Topical daily  cyanocobalamin Injectable 1000 MICROGram(s) IntraMuscular daily  epoetin raquel (EPOGEN) Injectable 29817 Unit(s) SubCutaneous every 7 days  folic acid 1 milliGRAM(s) Oral daily  heparin   Injectable 5000 Unit(s) SubCutaneous every 12 hours  lactulose Syrup 20 Gram(s) Oral every 6 hours  lidocaine 1%/epinephrine 1:100,000 Inj 2 Vial(s) Local Injection once  lidocaine 1%/epinephrine 1:100,000 Inj 3 Vial(s) Local Injection once  metoprolol tartrate 12.5 milliGRAM(s) Oral every 12 hours  pantoprazole    Tablet 40 milliGRAM(s) Oral before breakfast  polyethylene glycol 3350 17 Gram(s) Oral daily  senna 2 Tablet(s) Oral at bedtime  sodium zirconium cyclosilicate 10 Gram(s) Oral daily        VITALS/PHYSICAL EXAM  --------------------------------------------------------------------------------  T(C): 35.6 (08-13-24 @ 04:00), Max: 36.4 (08-12-24 @ 08:26)  HR: 66 (08-13-24 @ 04:00) (66 - 76)  BP: 111/55 (08-13-24 @ 04:00) (90/49 - 111/55)  RR: 20 (08-13-24 @ 04:00) (18 - 20)  SpO2: 96% (08-13-24 @ 04:00) (96% - 100%)  Wt(kg): --        08-12-24 @ 07:01  -  08-13-24 @ 07:00  --------------------------------------------------------  IN: 210 mL / OUT: 1050 mL / NET: -840 mL      Physical Exam:  	Gen: NAD  	Pulm: decrease BS  B/L  	CV:  S1S2; no rub  	Abd: +distended  	Vascular access:    LABS/STUDIES  --------------------------------------------------------------------------------              8.4    6.55  >-----------<  182      [08-11-24 @ 21:38]              27.4     146  |  110  |  98  ----------------------------<  77      [08-12-24 @ 06:30]  5.4   |  21  |  2.5        Ca     8.4     [08-12-24 @ 06:30]      Mg     2.8     [08-12-24 @ 06:30]      Phos  6.0     [08-12-24 @ 06:30]    TPro  5.2  /  Alb  2.7  /  TBili  1.1  /  DBili  x   /  AST  42  /  ALT  22  /  AlkPhos  94  [08-12-24 @ 06:30]      Creatinine Trend:  SCr 2.5 [08-12 @ 06:30]  SCr 2.5 [08-12 @ 03:55]  SCr 2.4 [08-11 @ 21:38]  SCr 2.8 [08-11 @ 07:01]  SCr 2.7 [08-09 @ 17:47]    Urinalysis - [08-12-24 @ 06:30]      Color  / Appearance  / SG  / pH       Gluc 77 / Ketone   / Bili  / Urobili        Blood  / Protein  / Leuk Est  / Nitrite       RBC  / WBC  / Hyaline  / Gran  / Sq Epi  / Non Sq Epi  / Bacteria       Iron 23, TIBC 163, %sat 14      [08-01-24 @ 14:29]  Ferritin 170      [08-01-24 @ 14:29]  TSH 4.36      [08-08-24 @ 09:40]    HBsAb Nonreact      [08-11-24 @ 11:39]  HBsAg Nonreact      [08-11-24 @ 11:39]  HBcAb Nonreact      [08-11-24 @ 11:39]

## 2024-08-13 NOTE — PROGRESS NOTE ADULT - ASSESSMENT
78-year-old female with past medical history of   CKD, HTN, CCY, gout,  severe AS being planned for TAVR, and history of severe anemia secondary to chronic upper GI bleeding due to  AV malformation gastric body dieulafoy lesion, weekly blood transfusions presenting to ER for a rupture pressure ulcer of the buttock area.  #EVAN on CKD4/ anemia   #AMS  - pt is obtunded, neuro on board, s/p stroke code   - hd today standard bath uf 1 liter after that will assess daily   - keep holding  diuretics  - non oliguric   - no hydro on renal/bladder sono   - phos noted; monitor  - start midodrine 10mg q8h   - lokelma 10 q 24 if repeated k > 5.5  - cont WOODY wkly  - oozing from udall/ dressing changed / follow closely   - h/h noted / on WOODY on the floor if remains on hd will change to after HD   - neuro f/u   - overall prognosis poor / palliative care evaluation   will follow

## 2024-08-13 NOTE — PROGRESS NOTE ADULT - ASSESSMENT
78-year-old female with past medical history of   CKD, HTN, CCY, gout,  severe AS being planned for tAVR, and history of severe anemia secondary to chronic upper GI bleeding due to  AV malformation gastric body dieulafoy lesion, weekly blood transfusions presenting to ER for a rupture pressure ulcer of the buttock area. Hospital course complicated by worsening encephalopathy s/p urgent HD, upgraded to SDU    Worsening metabolic encephalopathy - possibly uremic?  Acute kidney inury on CKD stage 4,   Elevated CK 2700 - improved   Hyperkalemia - resolved  - per documentation, patient wqith worsening mental status since 8/9, s/p RRT and code stroke 8/11  - ammonia >200/TSH N/CTH no acute process  - CTAP done and reported-  No evidence of an intraperitoneal or retroperitoneal hematoma; CT evidence of anemia as above. Moderate rectal stool burden with associated rectal wall thickening and adjacent edema. Findings may reflect developing stercoral proctitis.  - patient started on Lactulose--> now having BM, goal 2-3 BM daily   - s/p urgent HD 8/11 via [facility], nephrology following,   HD today  - c/w Lokelma daily  - mental status now improving,  today aao x2, cleared for PO diet per s/s   - Ucx with GNR, but given improvement in mental status/ no leukocytosis or fevers over the last few days, can hold off on antibiotics and fu sensitivities     Constipation  - CT with significant stool burden, possible developing stercoral colitis  - now having BM, c/w bowel regimen. Please document BM     Bilateral buttocks and sacrum deep Tissue Injury  - ID following: hold antibiotics -  wounds without any gross signs of infection    - c/w wound care per burn recs  - fu burn if bedside debridement indicated     Acute on chronic  anemia  History of AVMs (gastric and duodenal) and Dieulafoy Lesions Status Post Hemostasis in 2023  History of perisplenic Varices in 2023   - s/p EGD/colonoscopy/push enteroscopy on 7/22 showing AVMs and polyps  - s/p 1U pRBC on 8/2, s/p course of venofer  - on weekly IV transfusion/iron infusions per hematology  - monitor h/h, keep active T&S  - OP fu with GI    Chronic diastolic CHF  Severe aortic stenosis   -  TTE Echo Complete w/o Contrast w/ Doppler (08.01.24 @ 11:03) >EF of 56 %. Mild asymmetric left ventricular hypertrophy. Grade II diastolic dysfunction). Severe aortic valve stenosis , Mild to moderate mitral valve regurgitation. Moderate aortic regurgitation.  - Patient to follow up with Dr. Hernandez as an outpatient for TAVR  - c/w metoprolol, added Midodrine 10 q8H  - avoid fluid overload  - OP fu with cardio Dr Hernandez     Thrombocytopenia- resolved    GOC : DNR/ DNI with NIV  PLease discuss all with son Domenico as he's the HCP    Overall prognosis is guarded  Pending: fu Ucx/sensitivities, HD per renal, labs, monitor PO intake     Patient seen at bedside, total time spent to evaluate and treat the patient's acute illness and chronic medical conditions as well as time spent reviewing prior records, labs, radiology, documenting in electronic medical records,  discussing medical plan with   medical team was more than 45 minutes with >50% of time spent face to face with patient, discussing with patient/family as well as coordination of care

## 2024-08-13 NOTE — PROGRESS NOTE ADULT - ASSESSMENT
IMPRESSION:    -AMS sp stroke code/ head CT noted  -H/O CKD  -severe AS plan for TAVR   -B/L buttock wound   -Uremia SP HD  -Hyperkalemia-resolved       PLAN:      CNS: avoid sedation, neuro on board, f/u EEG noted, CT head noted     HEENT: Oral care    PULMONARY:  HOB @ 45 degrees. Aspiration precautions, wean oxygen, keep SaO2 92 TO 96%    CARDIOVASCULAR: avoid volume overload, MAP adequate, trend troponin, midodrine with HD     GI: GI prophylaxis.  Feeding. Ensure BM, on lactulose, 2-3BM/day goal      RENAL:  Follow up lytes. Correct as needed, nephro on board, plan for HD for uremia/EVAN, s/p 1 session     INFECTIOUS DISEASE: Follow up cultures, procalcitonin, f/u burn for debridment     HEMATOLOGICAL:  DVT prophylaxis    ENDOCRINE:  Follow up FS.  Insulin protocol if needed.    MUSCULOSKELETAL: ambulate as tolerated      Downgrade      IMPRESSION:    -AMS sp stroke code/ head CT noted  -H/O CKD  -severe AS plan for TAVR   -B/L buttock wound   -Uremia SP HD  -Hyperkalemia-resolved       PLAN:      CNS: avoid sedation, neuro on board, f/u EEG noted, CT head noted     HEENT: Oral care    PULMONARY:  HOB @ 45 degrees. Aspiration precautions, wean oxygen, keep SaO2 92 TO 96%    CARDIOVASCULAR: avoid volume overload, MAP adequate, trend troponin, midodrine with HD     GI: GI prophylaxis.  Feeding. Ensure BM, on lactulose, 2-3BM/day goal      RENAL:  Follow up lytes. Correct as needed, nephro on board, plan for HD for uremia/EVAN, s/p 1 session     INFECTIOUS DISEASE: Follow up cultures, procalcitonin, f/u burn for debridment     HEMATOLOGICAL:  DVT prophylaxis    ENDOCRINE:  Follow up FS.  Insulin protocol if needed.    MUSCULOSKELETAL: ambulate as tolerated    poor prognosis    [facility]

## 2024-08-13 NOTE — CHART NOTE - NSCHARTNOTEFT_GEN_A_CORE
Transfer from stepdown to floors    Hospital Course:  Patient is a 78-year-old female with past medical history of CKD, HTN, CCY, gout, severe AS being planned for tAVR, and history of severe anemia secondary to chronic upper GI bleeding due to  AV malformation gastric body dieulafoy lesion, weekly blood transfusions presenting to ER for a rupture pressure ulcer of the buttock area today.   Patient states that last night was on her toilet which cracked while she was sitting on it, and patient was unable to get off the toilet for 4 hours.  By the time EMS arrived skin had ulcerated, with a blister that ruptured predominantly to the left buttock area (appears to be like burn blister that ruptured) with linear abrasions to the bilateral upper thighs/buttocks.  Patient in so much pain that she cannot stand or walk, Of note, pt was recently admitted to the hospital between 7/13-7/23 for hematochezia, s/p 1 unit prbc. Denies fever, chills. N/V, abd pain.     ED Vitals:  T(C): 36.4 (30 Jul 2024 00:51), Max: 37.1 (29 Jul 2024 18:30)  T(F): 97.6 (30 Jul 2024 00:51), Max: 98.7 (29 Jul 2024 18:30)  HR: 65 (30 Jul 2024 00:51) (59 - 65)  BP: 146/69 (30 Jul 2024 00:51) (112/46 - 146/69)  RR: 18 (30 Jul 2024 00:51) (18 - 18)  SpO2: 99% (30 Jul 2024 00:51) (95% - 99%)    Labs significant for wbc 11k , hgb 9.1 , Cr 3.5 (baseline 2-3). Admitted for wound management and placement as pt can not care for her own ADLs. Hospital course complicated by EVAN and worsening encephalopathy, possibly due to uremia, s/p urgent HD, upgraded to SDU. Ammonia level 206. CK 2700, now improved. Hyperkalemia, improved on lokelma. EVAN improved, back to baseline creat 2.5. CTAP done and reported-  No evidence of an intraperitoneal or retroperitoneal hematoma; CT evidence of anemia as above. Moderate rectal stool burden with associated rectal wall thickening and adjacent edema. Findings may reflect developing stercoral proctitis. Patient started on lactulose, which resolved constipation. Goal 2 bowel movements per day.    Code stroke called 8/11 due to worsening mental status since 8/9. CT/CTA head negative for acute intracranial process. Repeat CT head 8/12 stable. [place] placed by vascular and started HD 8/11. Had another session HD 8/13.  Mental status now improving,  Today aao x2, cleared for PO diet per s/s     Ucx with GNR, but given improvement in mental status/ no leukocytosis or fevers over the last few days, monitoring off antibiotics and fu sensitivities      Originally presented for bilateral buttocks and sacrum deep Tissue Injury. Followed by burn, recommended for bedside debridement if indicated. Holding off antibiotics -  wounds without any gross signs of infection. Wound care.    Patient has hx of acute on chronic  anemia. History of AVMs (gastric and duodenal) and Dieulafoy Lesions Status Post Hemostasis in 2023. History of perisplenic Varices in 2023. S/p EGD/colonoscopy/push enteroscopy on 7/22 showing AVMs and polyps. S/p 1U pRBC on 8/2, s/p course of venofer. On weekly IV transfusion/iron infusions per hematology.   Will continue to monitor h/h  Keep active T&S  fu with GI outpatient    Patient has history of Chronic diastolic CHF and severe aortic stenosis   TTE Echo Complete w/o Contrast w/ Doppler (08.01.24 @ 11:03) >EF of 56 %. Mild asymmetric left ventricular hypertrophy. Grade II diastolic dysfunction). Severe aortic valve stenosis , Mild to moderate mitral valve regurgitation. Moderate aortic regurgitation.  Patient to follow up with Dr. Hernandez as an outpatient for TAVR  c/w metoprolol, added Midodrine 10 q8H  avoid fluid overload  OP fu with cardio Dr Hernandez     Patient is DNR/ DNI with NIV  Plan discuss with son Domenico as he's the HCP    F/u: Ucx/sensitivities, Renal recommendations and HD per renal, labs, monitor PO intake, daily BMs on lactulose

## 2024-08-13 NOTE — CHART NOTE - NSCHARTNOTEFT_GEN_A_CORE
Patient was hypotensive 93/39 MAP 57. Given LR bolus 500cc and midodrine 10mg. BP was reaccessed over the next an hour and half later, remained soft low of 101/41 MAP 59. Nursing staff concerned about her being stable for transfer and repeat hypotension. Canceled transfer and will continue to monitor

## 2024-08-13 NOTE — PROGRESS NOTE ADULT - SUBJECTIVE AND OBJECTIVE BOX
Patient is a 78y old  Female who presents with a chief complaint of AMS (12 Aug 2024 11:24)        Over Night Events: mentation improving, advance diet         ROS:     All ROS are negative except HPI         PHYSICAL EXAM    ICU Vital Signs Last 24 Hrs  T(C): 35.9 (13 Aug 2024 07:42), Max: 36.2 (12 Aug 2024 16:00)  T(F): 96.6 (13 Aug 2024 07:42), Max: 97.1 (12 Aug 2024 16:00)  HR: 53 (13 Aug 2024 07:42) (53 - 76)  BP: 92/58 (13 Aug 2024 07:42) (90/49 - 111/55)  BP(mean): 71 (13 Aug 2024 07:42) (65 - 73)  ABP: --  ABP(mean): --  RR: 20 (13 Aug 2024 07:42) (18 - 20)  SpO2: 100% (13 Aug 2024 07:42) (96% - 100%)    O2 Parameters below as of 13 Aug 2024 07:42  Patient On (Oxygen Delivery Method): room air      GENERAL:  NAD chronically ill appearing   SKIN: No rashes or lesions  HEENT: Atraumatic. Normocephalic.    NECK: Supple, No JVD.    PULMONARY: Coarse breath sounds  B/L. No wheezing. No rales  CVS: Normal S1, S2. Rate and Rhythm are regular.    ABDOMEN/GI: Soft, Nontender, Nondistended   MSK:  No clubbing or cyanosis   NEUROLOGIC: able to follow simple commands   PSYCH: sleepy, arousable, oriented to self     08-12-24 @ 07:01  -  08-13-24 @ 07:00  --------------------------------------------------------  IN:    Oral Fluid: 210 mL  Total IN: 210 mL    OUT:    Indwelling Catheter - Urethral (mL): 1050 mL  Total OUT: 1050 mL    Total NET: -840 mL          LABS:                            8.4    6.55  )-----------( 182      ( 11 Aug 2024 21:38 )             27.4                                               08-12    146  |  110  |  98<HH>  ----------------------------<  77  5.4<H>   |  21  |  2.5<H>    Ca    8.4      12 Aug 2024 06:30  Phos  6.0     08-12  Mg     2.8     08-12    TPro  5.2<L>  /  Alb  2.7<L>  /  TBili  1.1  /  DBili  x   /  AST  42<H>  /  ALT  22  /  AlkPhos  94  08-12                                             Urinalysis Basic - ( 12 Aug 2024 06:30 )    Color: x / Appearance: x / SG: x / pH: x  Gluc: 77 mg/dL / Ketone: x  / Bili: x / Urobili: x   Blood: x / Protein: x / Nitrite: x   Leuk Esterase: x / RBC: x / WBC x   Sq Epi: x / Non Sq Epi: x / Bacteria: x                                                  LIVER FUNCTIONS - ( 12 Aug 2024 06:30 )  Alb: 2.7 g/dL / Pro: 5.2 g/dL / ALK PHOS: 94 U/L / ALT: 22 U/L / AST: 42 U/L / GGT: x                                                  Culture - Urine (collected 11 Aug 2024 12:55)  Source: Clean Catch Clean Catch (Midstream)  Preliminary Report (13 Aug 2024 00:58):    >100,000 CFU/ml Gram Negative Rods    Culture - Blood (collected 11 Aug 2024 07:01)  Source: .Blood Blood-Peripheral  Preliminary Report (12 Aug 2024 16:01):    No growth at 24 hours                                                                                           MEDICATIONS  (STANDING):  acetaminophen     Tablet .. 1000 milliGRAM(s) Oral every 8 hours  chlorhexidine 2% Cloths 1 Application(s) Topical daily  cyanocobalamin Injectable 1000 MICROGram(s) IntraMuscular daily  epoetin raquel (EPOGEN) Injectable 73249 Unit(s) SubCutaneous every 7 days  folic acid 1 milliGRAM(s) Oral daily  heparin   Injectable 5000 Unit(s) SubCutaneous every 12 hours  lactulose Syrup 20 Gram(s) Oral every 6 hours  lidocaine 1%/epinephrine 1:100,000 Inj 2 Vial(s) Local Injection once  lidocaine 1%/epinephrine 1:100,000 Inj 3 Vial(s) Local Injection once  metoprolol tartrate 12.5 milliGRAM(s) Oral every 12 hours  midodrine. 10 milliGRAM(s) Oral three times a day  pantoprazole    Tablet 40 milliGRAM(s) Oral before breakfast  polyethylene glycol 3350 17 Gram(s) Oral daily  senna 2 Tablet(s) Oral at bedtime  sodium zirconium cyclosilicate 10 Gram(s) Oral daily    MEDICATIONS  (PRN):       Patient is a 78y old  Female who presents with a chief complaint of AMS (12 Aug 2024 11:24)        Over Night Events: mentation improving, advance diet, on RA      PHYSICAL EXAM    ICU Vital Signs Last 24 Hrs  T(C): 35.9 (13 Aug 2024 07:42), Max: 36.2 (12 Aug 2024 16:00)  T(F): 96.6 (13 Aug 2024 07:42), Max: 97.1 (12 Aug 2024 16:00)  HR: 53 (13 Aug 2024 07:42) (53 - 76)  BP: 92/58 (13 Aug 2024 07:42) (90/49 - 111/55)  BP(mean): 71 (13 Aug 2024 07:42) (65 - 73)  RR: 20 (13 Aug 2024 07:42) (18 - 20)  SpO2: 100% (13 Aug 2024 07:42) (96% - 100%)    O2 Parameters below as of 13 Aug 2024 07:42  Patient On (Oxygen Delivery Method): room air      GENERAL:  NAD chronically ill appearing   SKIN: No rashes or lesions  HEENT: Atraumatic. Normocephalic.    NECK: Supple, No JVD.    PULMONARY: Coarse breath sounds  B/L. No wheezing. No rales  CVS: bren 2.6  ABDOMEN/GI: Soft, Nontender, Nondistended   MSK:  No clubbing or cyanosis   NEUROLOGIC: able to follow simple commands   PSYCH: sleepy, arousable, oriented to self     08-12-24 @ 07:01  -  08-13-24 @ 07:00  --------------------------------------------------------  IN:    Oral Fluid: 210 mL  Total IN: 210 mL    OUT:    Indwelling Catheter - Urethral (mL): 1050 mL  Total OUT: 1050 mL    Total NET: -840 mL          LABS:                            8.4    6.55  )-----------( 182      ( 11 Aug 2024 21:38 )             27.4                                               08-12    146  |  110  |  98<HH>  ----------------------------<  77  5.4<H>   |  21  |  2.5<H>    Ca    8.4      12 Aug 2024 06:30  Phos  6.0     08-12  Mg     2.8     08-12    TPro  5.2<L>  /  Alb  2.7<L>  /  TBili  1.1  /  DBili  x   /  AST  42<H>  /  ALT  22  /  AlkPhos  94  08-12                                             Urinalysis Basic - ( 12 Aug 2024 06:30 )    Color: x / Appearance: x / SG: x / pH: x  Gluc: 77 mg/dL / Ketone: x  / Bili: x / Urobili: x   Blood: x / Protein: x / Nitrite: x   Leuk Esterase: x / RBC: x / WBC x   Sq Epi: x / Non Sq Epi: x / Bacteria: x                                                  LIVER FUNCTIONS - ( 12 Aug 2024 06:30 )  Alb: 2.7 g/dL / Pro: 5.2 g/dL / ALK PHOS: 94 U/L / ALT: 22 U/L / AST: 42 U/L / GGT: x                                                  Culture - Urine (collected 11 Aug 2024 12:55)  Source: Clean Catch Clean Catch (Midstream)  Preliminary Report (13 Aug 2024 00:58):    >100,000 CFU/ml Gram Negative Rods    Culture - Blood (collected 11 Aug 2024 07:01)  Source: .Blood Blood-Peripheral  Preliminary Report (12 Aug 2024 16:01):    No growth at 24 hours                                                                                           MEDICATIONS  (STANDING):  acetaminophen     Tablet .. 1000 milliGRAM(s) Oral every 8 hours  chlorhexidine 2% Cloths 1 Application(s) Topical daily  cyanocobalamin Injectable 1000 MICROGram(s) IntraMuscular daily  epoetin raquel (EPOGEN) Injectable 98124 Unit(s) SubCutaneous every 7 days  folic acid 1 milliGRAM(s) Oral daily  heparin   Injectable 5000 Unit(s) SubCutaneous every 12 hours  lactulose Syrup 20 Gram(s) Oral every 6 hours  lidocaine 1%/epinephrine 1:100,000 Inj 2 Vial(s) Local Injection once  lidocaine 1%/epinephrine 1:100,000 Inj 3 Vial(s) Local Injection once  metoprolol tartrate 12.5 milliGRAM(s) Oral every 12 hours  midodrine. 10 milliGRAM(s) Oral three times a day  pantoprazole    Tablet 40 milliGRAM(s) Oral before breakfast  polyethylene glycol 3350 17 Gram(s) Oral daily  senna 2 Tablet(s) Oral at bedtime  sodium zirconium cyclosilicate 10 Gram(s) Oral daily    MEDICATIONS  (PRN):

## 2024-08-13 NOTE — PROGRESS NOTE ADULT - SUBJECTIVE AND OBJECTIVE BOX
ARREAGALATRICIA BARRON  78y Female    CHIEF COMPLAINT:    Patient is a 78y old  Female who presents with a chief complaint of AMS (13 Aug 2024 10:03)    INTERVAL HPI/OVERNIGHT EVENTS:    Patient seen and examined. No acute events overnight. Mental status much improved, diet advanced per s/s    ROS: All other systems are negative.    Vital Signs:    T(F): 96.6 (24 @ 07:42), Max: 97.1 (24 @ 16:00)  HR: 53 (24 @ 07:42) (53 - 76)  BP: 92/58 (24 @ 07:42) (90/49 - 111/55)  RR: 20 (24 @ 07:42) (18 - 20)  SpO2: 100% (24 @ 07:42) (96% - 100%)    12 Aug 2024 07:01  -  13 Aug 2024 07:00  --------------------------------------------------------  IN: 210 mL / OUT: 1050 mL / NET: -840 mL     Daily Weight in k (13 Aug 2024 01:00)    PHYSICAL EXAM:    GENERAL:  NAD  SKIN: No rashes or lesions  HEENT: Atraumatic. Normocephalic.    NECK: Supple, No JVD.    PULMONARY: CTA B/L. No wheezing. No rales  CVS: Normal S1, S2. Rate and Rhythm are regular   ABDOMEN/GI: Soft, Nontender, Nondistended; BS present  MSK:  No clubbing or cyanosis   NEUROLOGIC:  follows commands   PSYCH: Alert & oriented x 2    Consultant(s) Notes Reviewed:  [x ] YES  [ ] NO  Care Discussed with Consultants/Other Providers [ x] YES  [ ] NO    LABS:                        8.4    6.55  )-----------( 182      ( 11 Aug 2024 21:38 )             27.4     146  |  110  |  98<HH>  ----------------------------<  77  5.4<H>   |  21  |  2.5<H>    Ca    8.4      12 Aug 2024 06:30  Phos  6.0     08-12  Mg     2.8     08-12    TPro  5.2<L>  /  Alb  2.7<L>  /  TBili  1.1  /  DBili  x   /  AST  42<H>  /  ALT  22  /  AlkPhos  94  08-12    Trop --, CKMB 3.8, CK --, 24 @ 07:01    Culture - Urine (collected 11 Aug 2024 12:55)  Source: Clean Catch Clean Catch (Midstream)  Preliminary Report (13 Aug 2024 00:58):    >100,000 CFU/ml Gram Negative Rods    Culture - Blood (collected 11 Aug 2024 07:01)  Source: .Blood Blood-Peripheral  Preliminary Report (12 Aug 2024 16:01):    No growth at 24 hours    RADIOLOGY & ADDITIONAL TESTS:      Imaging or report Personally Reviewed:  [x] YES  [ ] NO  EKG reviewed: [x] YES  [ ] NO    Medications:  Standing  acetaminophen     Tablet .. 1000 milliGRAM(s) Oral every 8 hours  chlorhexidine 2% Cloths 1 Application(s) Topical daily  cyanocobalamin Injectable 1000 MICROGram(s) IntraMuscular daily  epoetin raquel (EPOGEN) Injectable 82052 Unit(s) SubCutaneous every 7 days  folic acid 1 milliGRAM(s) Oral daily  heparin   Injectable 5000 Unit(s) SubCutaneous every 12 hours  lactulose Syrup 20 Gram(s) Oral every 6 hours  lidocaine 1%/epinephrine 1:100,000 Inj 2 Vial(s) Local Injection once  lidocaine 1%/epinephrine 1:100,000 Inj 3 Vial(s) Local Injection once  metoprolol tartrate 12.5 milliGRAM(s) Oral every 12 hours  midodrine. 10 milliGRAM(s) Oral three times a day  pantoprazole    Tablet 40 milliGRAM(s) Oral before breakfast  polyethylene glycol 3350 17 Gram(s) Oral daily  senna 2 Tablet(s) Oral at bedtime  sodium zirconium cyclosilicate 10 Gram(s) Oral daily    PRN Meds

## 2024-08-14 LAB
ALBUMIN SERPL ELPH-MCNC: 2.6 G/DL — LOW (ref 3.5–5.2)
ALBUMIN SERPL ELPH-MCNC: 3 G/DL — LOW (ref 3.5–5.2)
ALP SERPL-CCNC: 94 U/L — SIGNIFICANT CHANGE UP (ref 30–115)
ALP SERPL-CCNC: 97 U/L — SIGNIFICANT CHANGE UP (ref 30–115)
ALT FLD-CCNC: 23 U/L — SIGNIFICANT CHANGE UP (ref 0–41)
ALT FLD-CCNC: 25 U/L — SIGNIFICANT CHANGE UP (ref 0–41)
ANION GAP SERPL CALC-SCNC: 12 MMOL/L — SIGNIFICANT CHANGE UP (ref 7–14)
ANION GAP SERPL CALC-SCNC: 9 MMOL/L — SIGNIFICANT CHANGE UP (ref 7–14)
AST SERPL-CCNC: 37 U/L — SIGNIFICANT CHANGE UP (ref 0–41)
AST SERPL-CCNC: 42 U/L — HIGH (ref 0–41)
BASOPHILS # BLD AUTO: 0.01 K/UL — SIGNIFICANT CHANGE UP (ref 0–0.2)
BASOPHILS # BLD AUTO: 0.02 K/UL — SIGNIFICANT CHANGE UP (ref 0–0.2)
BASOPHILS NFR BLD AUTO: 0.2 % — SIGNIFICANT CHANGE UP (ref 0–1)
BASOPHILS NFR BLD AUTO: 0.4 % — SIGNIFICANT CHANGE UP (ref 0–1)
BILIRUB SERPL-MCNC: 0.9 MG/DL — SIGNIFICANT CHANGE UP (ref 0.2–1.2)
BILIRUB SERPL-MCNC: 0.9 MG/DL — SIGNIFICANT CHANGE UP (ref 0.2–1.2)
BUN SERPL-MCNC: 58 MG/DL — HIGH (ref 10–20)
BUN SERPL-MCNC: 60 MG/DL — HIGH (ref 10–20)
CALCIUM SERPL-MCNC: 7.4 MG/DL — LOW (ref 8.4–10.5)
CALCIUM SERPL-MCNC: 8 MG/DL — LOW (ref 8.4–10.5)
CHLORIDE SERPL-SCNC: 105 MMOL/L — SIGNIFICANT CHANGE UP (ref 98–110)
CHLORIDE SERPL-SCNC: 107 MMOL/L — SIGNIFICANT CHANGE UP (ref 98–110)
CO2 SERPL-SCNC: 24 MMOL/L — SIGNIFICANT CHANGE UP (ref 17–32)
CO2 SERPL-SCNC: 28 MMOL/L — SIGNIFICANT CHANGE UP (ref 17–32)
CREAT SERPL-MCNC: 2.1 MG/DL — HIGH (ref 0.7–1.5)
CREAT SERPL-MCNC: 2.2 MG/DL — HIGH (ref 0.7–1.5)
EGFR: 22 ML/MIN/1.73M2 — LOW
EGFR: 24 ML/MIN/1.73M2 — LOW
EOSINOPHIL # BLD AUTO: 0.47 K/UL — SIGNIFICANT CHANGE UP (ref 0–0.7)
EOSINOPHIL # BLD AUTO: 0.51 K/UL — SIGNIFICANT CHANGE UP (ref 0–0.7)
EOSINOPHIL NFR BLD AUTO: 10.3 % — HIGH (ref 0–8)
EOSINOPHIL NFR BLD AUTO: 9.4 % — HIGH (ref 0–8)
GLUCOSE SERPL-MCNC: 82 MG/DL — SIGNIFICANT CHANGE UP (ref 70–99)
GLUCOSE SERPL-MCNC: 92 MG/DL — SIGNIFICANT CHANGE UP (ref 70–99)
HCT VFR BLD CALC: 23.9 % — LOW (ref 37–47)
HCT VFR BLD CALC: 24.8 % — LOW (ref 37–47)
HGB BLD-MCNC: 7.3 G/DL — LOW (ref 12–16)
HGB BLD-MCNC: 7.6 G/DL — LOW (ref 12–16)
IMM GRANULOCYTES NFR BLD AUTO: 0.4 % — HIGH (ref 0.1–0.3)
IMM GRANULOCYTES NFR BLD AUTO: 0.4 % — HIGH (ref 0.1–0.3)
LYMPHOCYTES # BLD AUTO: 0.48 K/UL — LOW (ref 1.2–3.4)
LYMPHOCYTES # BLD AUTO: 0.51 K/UL — LOW (ref 1.2–3.4)
LYMPHOCYTES # BLD AUTO: 10.5 % — LOW (ref 20.5–51.1)
LYMPHOCYTES # BLD AUTO: 9.4 % — LOW (ref 20.5–51.1)
MAGNESIUM SERPL-MCNC: 2.4 MG/DL — SIGNIFICANT CHANGE UP (ref 1.8–2.4)
MCHC RBC-ENTMCNC: 30.5 G/DL — LOW (ref 32–37)
MCHC RBC-ENTMCNC: 30.6 G/DL — LOW (ref 32–37)
MCHC RBC-ENTMCNC: 30.9 PG — SIGNIFICANT CHANGE UP (ref 27–31)
MCHC RBC-ENTMCNC: 31.1 PG — HIGH (ref 27–31)
MCV RBC AUTO: 101.3 FL — HIGH (ref 81–99)
MCV RBC AUTO: 101.6 FL — HIGH (ref 81–99)
MONOCYTES # BLD AUTO: 0.43 K/UL — SIGNIFICANT CHANGE UP (ref 0.1–0.6)
MONOCYTES # BLD AUTO: 0.49 K/UL — SIGNIFICANT CHANGE UP (ref 0.1–0.6)
MONOCYTES NFR BLD AUTO: 10.7 % — HIGH (ref 1.7–9.3)
MONOCYTES NFR BLD AUTO: 8 % — SIGNIFICANT CHANGE UP (ref 1.7–9.3)
NEUTROPHILS # BLD AUTO: 3.09 K/UL — SIGNIFICANT CHANGE UP (ref 1.4–6.5)
NEUTROPHILS # BLD AUTO: 3.91 K/UL — SIGNIFICANT CHANGE UP (ref 1.4–6.5)
NEUTROPHILS NFR BLD AUTO: 67.9 % — SIGNIFICANT CHANGE UP (ref 42.2–75.2)
NEUTROPHILS NFR BLD AUTO: 72.4 % — SIGNIFICANT CHANGE UP (ref 42.2–75.2)
NRBC # BLD: 0 /100 WBCS — SIGNIFICANT CHANGE UP (ref 0–0)
NRBC # BLD: 0 /100 WBCS — SIGNIFICANT CHANGE UP (ref 0–0)
PLATELET # BLD AUTO: 131 K/UL — SIGNIFICANT CHANGE UP (ref 130–400)
PLATELET # BLD AUTO: 133 K/UL — SIGNIFICANT CHANGE UP (ref 130–400)
PMV BLD: 10.5 FL — HIGH (ref 7.4–10.4)
PMV BLD: SIGNIFICANT CHANGE UP (ref 7.4–10.4)
POTASSIUM SERPL-MCNC: 3.8 MMOL/L — SIGNIFICANT CHANGE UP (ref 3.5–5)
POTASSIUM SERPL-MCNC: 4.2 MMOL/L — SIGNIFICANT CHANGE UP (ref 3.5–5)
POTASSIUM SERPL-SCNC: 3.8 MMOL/L — SIGNIFICANT CHANGE UP (ref 3.5–5)
POTASSIUM SERPL-SCNC: 4.2 MMOL/L — SIGNIFICANT CHANGE UP (ref 3.5–5)
PROT SERPL-MCNC: 4.5 G/DL — LOW (ref 6–8)
PROT SERPL-MCNC: 5.1 G/DL — LOW (ref 6–8)
RBC # BLD: 2.36 M/UL — LOW (ref 4.2–5.4)
RBC # BLD: 2.44 M/UL — LOW (ref 4.2–5.4)
RBC # FLD: 20.7 % — HIGH (ref 11.5–14.5)
RBC # FLD: 20.7 % — HIGH (ref 11.5–14.5)
SODIUM SERPL-SCNC: 141 MMOL/L — SIGNIFICANT CHANGE UP (ref 135–146)
SODIUM SERPL-SCNC: 144 MMOL/L — SIGNIFICANT CHANGE UP (ref 135–146)
WBC # BLD: 4.56 K/UL — LOW (ref 4.8–10.8)
WBC # BLD: 5.4 K/UL — SIGNIFICANT CHANGE UP (ref 4.8–10.8)
WBC # FLD AUTO: 4.56 K/UL — LOW (ref 4.8–10.8)
WBC # FLD AUTO: 5.4 K/UL — SIGNIFICANT CHANGE UP (ref 4.8–10.8)

## 2024-08-14 PROCEDURE — 11043 DBRDMT MUSC&/FSCA 1ST 20/<: CPT

## 2024-08-14 PROCEDURE — 99233 SBSQ HOSP IP/OBS HIGH 50: CPT

## 2024-08-14 PROCEDURE — 99232 SBSQ HOSP IP/OBS MODERATE 35: CPT

## 2024-08-14 PROCEDURE — 11046 DBRDMT MUSC&/FSCA EA ADDL: CPT

## 2024-08-14 RX ORDER — SUCRALFATE 1 G/10ML
1 SUSPENSION ORAL EVERY 12 HOURS
Refills: 0 | Status: DISCONTINUED | OUTPATIENT
Start: 2024-08-14 | End: 2024-08-23

## 2024-08-14 RX ORDER — PANTOPRAZOLE SODIUM 40 MG
40 TABLET, DELAYED RELEASE (ENTERIC COATED) ORAL
Refills: 0 | Status: DISCONTINUED | OUTPATIENT
Start: 2024-08-14 | End: 2024-08-14

## 2024-08-14 RX ADMIN — Medication 40 MILLIGRAM(S): at 05:16

## 2024-08-14 RX ADMIN — ACETAMINOPHEN 1000 MILLIGRAM(S): 325 TABLET ORAL at 13:26

## 2024-08-14 RX ADMIN — Medication 2 TABLET(S): at 21:14

## 2024-08-14 RX ADMIN — Medication 1000 MICROGRAM(S): at 12:31

## 2024-08-14 RX ADMIN — Medication 20 GRAM(S): at 16:56

## 2024-08-14 RX ADMIN — Medication 20 GRAM(S): at 11:52

## 2024-08-14 RX ADMIN — SUCRALFATE 1 GRAM(S): 1 SUSPENSION ORAL at 16:56

## 2024-08-14 RX ADMIN — MIDODRINE HYDROCHLORIDE 10 MILLIGRAM(S): 5 TABLET ORAL at 16:56

## 2024-08-14 RX ADMIN — MIDODRINE HYDROCHLORIDE 10 MILLIGRAM(S): 5 TABLET ORAL at 05:16

## 2024-08-14 RX ADMIN — POLYETHYLENE GLYCOL 3350 17 GRAM(S): 17 POWDER, FOR SOLUTION ORAL at 11:52

## 2024-08-14 RX ADMIN — METOPROLOL TARTRATE 12.5 MILLIGRAM(S): 100 TABLET ORAL at 05:16

## 2024-08-14 RX ADMIN — Medication 1 MILLIGRAM(S): at 11:52

## 2024-08-14 RX ADMIN — ACETAMINOPHEN 1000 MILLIGRAM(S): 325 TABLET ORAL at 05:39

## 2024-08-14 RX ADMIN — ACETAMINOPHEN 1000 MILLIGRAM(S): 325 TABLET ORAL at 14:00

## 2024-08-14 RX ADMIN — METOPROLOL TARTRATE 12.5 MILLIGRAM(S): 100 TABLET ORAL at 16:56

## 2024-08-14 RX ADMIN — MIDODRINE HYDROCHLORIDE 10 MILLIGRAM(S): 5 TABLET ORAL at 11:52

## 2024-08-14 RX ADMIN — Medication 20 GRAM(S): at 05:16

## 2024-08-14 RX ADMIN — Medication 5000 UNIT(S): at 16:56

## 2024-08-14 RX ADMIN — SODIUM ZIRCONIUM CYCLOSILICATE 10 GRAM(S): 5 POWDER, FOR SUSPENSION ORAL at 11:52

## 2024-08-14 RX ADMIN — CHLORHEXIDINE GLUCONATE 1 APPLICATION(S): 40 SOLUTION TOPICAL at 11:53

## 2024-08-14 RX ADMIN — ACETAMINOPHEN 1000 MILLIGRAM(S): 325 TABLET ORAL at 21:13

## 2024-08-14 RX ADMIN — ACETAMINOPHEN 1000 MILLIGRAM(S): 325 TABLET ORAL at 05:16

## 2024-08-14 RX ADMIN — Medication 5000 UNIT(S): at 05:22

## 2024-08-14 RX ADMIN — Medication 20 GRAM(S): at 23:29

## 2024-08-14 NOTE — PROGRESS NOTE ADULT - SUBJECTIVE AND OBJECTIVE BOX
78-year-old female with past medical history of CKD, HTN, CCY, gout, severe AS being planned for tAVR, and history of severe anemia secondary to chronic upper GI bleeding due to  AV malformation gastric body dieulafoy lesion, weekly blood transfusions presenting to ER for a rupture pressure ulcer of the buttock area today.   Patient states that last night was on her toilet which cracked while she was sitting on it, and patient was unable to get off the toilet for 4 hours.  By the time EMS arrived skin had ulcerated, with a blister that ruptured predominantly to the left buttock area (appears to be like burn blister that ruptured) with linear abrasions to the bilateral upper thighs/buttocks.  Patient in so much pain that she cannot stand or walk, Of note, pt was recently admitted to the hospital between - for hematochezia, s/p 1 unit prbc. Denies fever, chills. N/V, abd pain.   Hospital course complicated by EVAN and worsening encephalopathy, possibly due to uremia, s/p urgent HD, upgraded to SDU. Ammonia level 206. CK 2700, now improved. Hyperkalemia, improved on lokelma. EVAN improved, back to baseline creat 2.5. CTAP done and reported-  No evidence of an intraperitoneal or retroperitoneal hematoma; CT evidence of anemia as above. Moderate rectal stool burden with associated rectal wall thickening and adjacent edema. Findings may reflect developing stercoral proctitis. Patient started on lactulose, which resolved constipation. Goal 2 bowel movements per day.  Code stroke called  due to worsening mental status since . CT/CTA head negative for acute intracranial process. Repeat CT head  stable. [place] placed by vascular and started HD . Had another session HD .  Mental status significantly improved, aao x3, cleared for PO diet per s/s.  Originally presented for bilateral buttocks and sacrum deep Tissue Injury. Followed by burn, recommended for bedside debridement.   Patient has hx of   anemia. History of AVMs (gastric and duodenal) and Dieulafoy Lesions Status Post Hemostasis in . History of perisplenic Varices in . S/p EGD/colonoscopy/push enteroscopy on  showing AVMs and polyps. S/p1U pRBC on , s/p course of venofer. On weekly IV transfusion/iron infusions per hematology.   Patient has history of Chronic diastolic CHF and severe aortic stenosis , OP  with Dr. Hernandez recommended  for TAVR  Patient is DNR/ DNI with NIV.   Today pt is awake and alert, answering questions, weak, denies any specific complaints.     PAST MEDICAL & SURGICAL HISTORY:  HTN (hypertension)  Heart murmur  Eczema  Anemia  iron infusions and PRBC transfusions  Aortic stenosis  Chronic kidney disease (CKD)  2019 novel coronavirus disease (COVID-19)  2020- [facility] admission  Gastric AVM  H/O appendicitis  S/P cholecystectomy  History of esophagogastroduodenoscopy (EGD)  H/O colonoscopy  H/O  section  History of appendectomy  Port-A-Cath in place  right CW      Vital Signs Last 24 Hrs  T(C): 36.4 (14 Aug 2024 16:00), Max: 37.3 (13 Aug 2024 20:00)  T(F): 97.6 (14 Aug 2024 16:00), Max: 99.2 (13 Aug 2024 20:00)  HR: 60 (14 Aug 2024 16:00) (51 - 67)  BP: 94/46 (14 Aug 2024 16:00) (90/42 - 116/46)  BP(mean): 58 (14 Aug 2024 16:00) (58 - 67)  RR: 20 (14 Aug 2024 16:00) (18 - 20)  SpO2: 100% (14 Aug 2024 16:00) (94% - 100%)    Parameters below as of 14 Aug 2024 16:00  Patient On (Oxygen Delivery Method): room air      PHYSICAL EXAM:  GENERAL: NAD, awake answering questions   HEAD:  Atraumatic, Normocephalic  NECK: Supple, No JVD, Normal thyroid  NERVOUS SYSTEM:  Alert & Oriented X3, following commands, answering questions   CHEST/LUNG: decreased BS at bases, chemo port noted right chest   HEART: Regular rate and rhythm; No murmurs, rubs, or gallops  ABDOMEN: Soft, Nontender, Nondistended; Bowel sounds present  EXTREMITIES:  2+ Peripheral Pulses, No clubbing, cyanosis, or edema, Udol noted in right groin         LABS:                        7.3    4.56  )-----------( 131      ( 14 Aug 2024 12:02 )             23.9     08-14    141  |  105  |  60<H>  ----------------------------<  92  4.2   |  24  |  2.2<H>    Ca    7.4<L>      14 Aug 2024 12:02  Mg     2.4         TPro  4.5<L>  /  Alb  2.6<L>  /  TBili  0.9  /  DBili  x   /  AST  42<H>  /  ALT  23  /  AlkPhos  94        Urinalysis Basic - ( 14 Aug 2024 12:02 )    Color: x / Appearance: x / SG: x / pH: x  Gluc: 92 mg/dL / Ketone: x  / Bili: x / Urobili: x   Blood: x / Protein: x / Nitrite: x   Leuk Esterase: x / RBC: x / WBC x   Sq Epi: x / Non Sq Epi: x / Bacteria: x      Culture - Urine (24 @ 12:55)   Specimen Source: Clean Catch Clean Catch (Midstream)  Culture Results:   >100,000 CFU/ml Enterobacter cloacae complex   Susceptibility to follow.      RADIOLOGY & ADDITIONAL TESTS:      < from: CT Head No Cont (24 @ 21:55) >  IMPRESSION:    Unremarkable study. Stable since .    < end of copied text >  < from: [facility] Duplex Lower Ext Vein Scan, Bilat (24 @ 17:17) >    IMPRESSION:  No evidence of deep venous thrombosis in either lower extremity.    < end of copied text >  < from: Xray Kidney Ureter Bladder (24 @ 01:24) >  FINDINGS/  IMPRESSION:    Moderate colonic stool. Nonspecific nonobstructive bowel gas pattern.   Degenerative changes in the spine, sacroiliac joints and bilateral hips.   Right femoral dialysis catheter tip projecting over the sacrum, likely   common iliac vein or IVC.    < end of copied text >  < from: CT Angio Brain Stroke Protocol  w/ IV Cont (24 @ 09:41) >    IMPRESSION:    CT PERFUSION: No perfusion abnormality.    CTA HEAD/NECK: No evidence of acute large vessel occlusion. 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 Mild   scattered atheromatous changes as above.    < end of copied text >  < from: TTE Echo Complete w/o Contrast w/ Doppler (24 @ 11:03) >  Summary:   1. Normal global left ventricular systolic function.   2. LV Ejection Fraction by Brown's Method with a biplane EF of 56 %.   3. Mild asymmetric left ventricular hypertrophy (13 mm).   4. Spectral Doppler shows pseudonormal pattern of left ventricular   myocardial filling (Grade II diastolic dysfunction). Elevated mean left   atrial pressure.   5. Normal right ventricular size and function.   6. Severe aortic valve stenosis (VMax 5.92 m/s, PG//90 mmHg, SHILA   0.88 cm2).   7. Moderate aortic regurgitation.   8. Mild to moderate mitral valve regurgitation.   9.Mild tricuspid regurgitation.  10. Estimated pulmonary artery systolic pressure is 39.4 mmHg assuming a   right atrial pressure of 13 mmHg, which is consistent with borderline   pulmonary hypertension.    MEDICATIONS  (STANDING):  acetaminophen     Tablet .. 1000 milliGRAM(s) Oral every 8 hours  chlorhexidine 2% Cloths 1 Application(s) Topical daily  epoetin raquel (EPOGEN) Injectable 79215 Unit(s) SubCutaneous every 7 days  folic acid 1 milliGRAM(s) Oral daily  heparin   Injectable 5000 Unit(s) SubCutaneous every 12 hours  lactulose Syrup 20 Gram(s) Oral every 6 hours  lidocaine 1%/epinephrine 1:100,000 Inj 2 Vial(s) Local Injection once  lidocaine 1%/epinephrine 1:100,000 Inj 3 Vial(s) Local Injection once  metoprolol tartrate 12.5 milliGRAM(s) Oral every 12 hours  midodrine. 10 milliGRAM(s) Oral three times a day  pantoprazole    Tablet 40 milliGRAM(s) Oral before breakfast  polyethylene glycol 3350 17 Gram(s) Oral daily  senna 2 Tablet(s) Oral at bedtime  sodium zirconium cyclosilicate 10 Gram(s) Oral daily  sucralfate 1 Gram(s) Oral every 12 hours

## 2024-08-14 NOTE — PROGRESS NOTE ADULT - SUBJECTIVE AND OBJECTIVE BOX
seen and examined  24 h events noted   no distress         PAST HISTORY  --------------------------------------------------------------------------------  No significant changes to PMH, PSH, FHx, SHx, unless otherwise noted    ALLERGIES & MEDICATIONS  --------------------------------------------------------------------------------  Allergies    aspirin (Rash; Other)  penicillins (Rash; Urticaria; Hives; Blisters)  latex (Unknown)  EKG leads (Hives)    Intolerances      Standing Inpatient Medications  acetaminophen     Tablet .. 1000 milliGRAM(s) Oral every 8 hours  chlorhexidine 2% Cloths 1 Application(s) Topical daily  cyanocobalamin Injectable 1000 MICROGram(s) IntraMuscular daily  epoetin raquel (EPOGEN) Injectable 51849 Unit(s) SubCutaneous every 7 days  folic acid 1 milliGRAM(s) Oral daily  heparin   Injectable 5000 Unit(s) SubCutaneous every 12 hours  lactulose Syrup 20 Gram(s) Oral every 6 hours  lidocaine 1%/epinephrine 1:100,000 Inj 2 Vial(s) Local Injection once  lidocaine 1%/epinephrine 1:100,000 Inj 3 Vial(s) Local Injection once  metoprolol tartrate 12.5 milliGRAM(s) Oral every 12 hours  midodrine. 10 milliGRAM(s) Oral three times a day  pantoprazole    Tablet 40 milliGRAM(s) Oral before breakfast  polyethylene glycol 3350 17 Gram(s) Oral daily  senna 2 Tablet(s) Oral at bedtime  sodium zirconium cyclosilicate 10 Gram(s) Oral daily      VITALS/PHYSICAL EXAM  --------------------------------------------------------------------------------  T(C): 36.7 (08-14-24 @ 08:05), Max: 37.3 (08-13-24 @ 20:00)  HR: 51 (08-14-24 @ 08:05) (51 - 74)  BP: 116/46 (08-14-24 @ 08:05) (91/41 - 116/46)  RR: 20 (08-14-24 @ 08:05) (18 - 20)  SpO2: 98% (08-14-24 @ 08:05) (94% - 99%)  Wt(kg): --        08-13-24 @ 07:01  -  08-14-24 @ 07:00  --------------------------------------------------------  IN: 0 mL / OUT: 1175 mL / NET: -1175 mL      Physical Exam:  	Gen: NAD  	Pulm: decrease BS B/L  	CV:  S1S2; no rub  	Abd: soft, /nondistended  	Vascular access:udall    LABS/STUDIES  --------------------------------------------------------------------------------              7.6    5.40  >-----------<  133      [08-14-24 @ 07:26]              24.8     144  |  107  |  58  ----------------------------<  82      [08-14-24 @ 07:26]  3.8   |  28  |  2.1        Ca     8.0     [08-14-24 @ 07:26]    TPro  5.1  /  Alb  3.0  /  TBili  0.9  /  DBili  x   /  AST  37  /  ALT  25  /  AlkPhos  97  [08-14-24 @ 07:26]      Creatinine Trend:  SCr 2.1 [08-14 @ 07:26]  SCr 2.5 [08-12 @ 06:30]  SCr 2.5 [08-12 @ 03:55]  SCr 2.4 [08-11 @ 21:38]  SCr 2.8 [08-11 @ 07:01]    Urinalysis - [08-14-24 @ 07:26]      Color  / Appearance  / SG  / pH       Gluc 82 / Ketone   / Bili  / Urobili        Blood  / Protein  / Leuk Est  / Nitrite       RBC  / WBC  / Hyaline  / Gran  / Sq Epi  / Non Sq Epi  / Bacteria       Iron 23, TIBC 163, %sat 14      [08-01-24 @ 14:29]  Ferritin 170      [08-01-24 @ 14:29]  TSH 4.36      [08-08-24 @ 09:40]    HBsAb Nonreact      [08-11-24 @ 11:39]  HBsAg Nonreact      [08-11-24 @ 11:39]  HBcAb Nonreact      [08-11-24 @ 11:39]

## 2024-08-14 NOTE — PROGRESS NOTE ADULT - SUBJECTIVE AND OBJECTIVE BOX
Patient is a 78y old  Female who presents with a chief complaint of sacral wound (14 Aug 2024 07:01)    INTERVAL HPI/OVERNIGHT EVENTS:  8/13 s/p bedside debridement of bilateral buttocks by Dr Schwarz     Vital Signs Last 24 Hrs  T(C): 36.6 (14 Aug 2024 11:27), Max: 37.3 (13 Aug 2024 20:00)  T(F): 97.9 (14 Aug 2024 11:27), Max: 99.2 (13 Aug 2024 20:00)  HR: 61 (14 Aug 2024 11:27) (51 - 74)  BP: 90/42 (14 Aug 2024 11:27) (90/42 - 116/46)  BP(mean): 60 (14 Aug 2024 11:27) (59 - 67)  RR: 20 (14 Aug 2024 11:27) (18 - 20)  SpO2: 99% (14 Aug 2024 11:27) (94% - 99%)    O2 Parameters below as of 14 Aug 2024 11:27  Patient On (Oxygen Delivery Method): room air    Allergies  aspirin (Rash; Other)  penicillins (Rash; Urticaria; Hives; Blisters)  latex (Unknown)  EKG leads (Hives)    Lab Results:                        7.6    5.40  )-----------( 133      ( 14 Aug 2024 07:26 )             24.8       MEDICATIONS  (STANDING):  acetaminophen     Tablet .. 1000 milliGRAM(s) Oral every 8 hours  chlorhexidine 2% Cloths 1 Application(s) Topical daily  epoetin raquel (EPOGEN) Injectable 28687 Unit(s) SubCutaneous every 7 days  folic acid 1 milliGRAM(s) Oral daily  heparin   Injectable 5000 Unit(s) SubCutaneous every 12 hours  lactulose Syrup 20 Gram(s) Oral every 6 hours  lidocaine 1%/epinephrine 1:100,000 Inj 2 Vial(s) Local Injection once  lidocaine 1%/epinephrine 1:100,000 Inj 3 Vial(s) Local Injection once  metoprolol tartrate 12.5 milliGRAM(s) Oral every 12 hours  midodrine. 10 milliGRAM(s) Oral three times a day  pantoprazole    Tablet 40 milliGRAM(s) Oral before breakfast  polyethylene glycol 3350 17 Gram(s) Oral daily  senna 2 Tablet(s) Oral at bedtime  sodium zirconium cyclosilicate 10 Gram(s) Oral daily  sucralfate 1 Gram(s) Oral every 12 hours    PHYSICAL EXAM:  GENERAL: seen on bedside, alert, NAD, laying in bed comfortably, cooperative  SKIN/Wound: bilateral buttocks full thickness and partial thickness wounds, s/p debridement of bilateral buttock wounds,  Left buttock wound base pink, no pus drainage, no bleeding noted, + serosanguineous discharge;   Right buttock wound base pale with areas of grayish non healthy tissue, no pus drainage, no bleeding noted.  Dressing done, pt tolerated well.

## 2024-08-14 NOTE — CHART NOTE - NSCHARTNOTEFT_GEN_A_CORE
Transfer from stepdown to floors    Hospital Course:  Patient is a 78-year-old female with past medical history of CKD, HTN, CCY, gout, severe AS being planned for tAVR, and history of severe anemia secondary to chronic upper GI bleeding due to  AV malformation gastric body dieulafoy lesion, weekly blood transfusions presenting to ER for a rupture pressure ulcer of the buttock area today.   Patient states that last night was on her toilet which cracked while she was sitting on it, and patient was unable to get off the toilet for 4 hours.  By the time EMS arrived skin had ulcerated, with a blister that ruptured predominantly to the left buttock area (appears to be like burn blister that ruptured) with linear abrasions to the bilateral upper thighs/buttocks.  Patient in so much pain that she cannot stand or walk, Of note, pt was recently admitted to the hospital between 7/13-7/23 for hematochezia, s/p 1 unit prbc. Denies fever, chills. N/V, abd pain.     ED Vitals:  T(C): 36.4 (30 Jul 2024 00:51), Max: 37.1 (29 Jul 2024 18:30)  T(F): 97.6 (30 Jul 2024 00:51), Max: 98.7 (29 Jul 2024 18:30)  HR: 65 (30 Jul 2024 00:51) (59 - 65)  BP: 146/69 (30 Jul 2024 00:51) (112/46 - 146/69)  RR: 18 (30 Jul 2024 00:51) (18 - 18)  SpO2: 99% (30 Jul 2024 00:51) (95% - 99%)    Labs significant for wbc 11k , hgb 9.1 , Cr 3.5 (baseline 2-3). Admitted for wound management and placement as pt can not care for her own ADLs. Hospital course complicated by EVAN and worsening encephalopathy, possibly due to uremia, s/p urgent HD, upgraded to SDU. Ammonia level 206. CK 2700, now improved. Hyperkalemia, improved on lokelma. EVAN improved, back to baseline creat 2.5. CTAP done and reported-  No evidence of an intraperitoneal or retroperitoneal hematoma; CT evidence of anemia as above. Moderate rectal stool burden with associated rectal wall thickening and adjacent edema. Findings may reflect developing stercoral proctitis. Patient started on lactulose, which resolved constipation. Goal 2 bowel movements per day.    Code stroke called 8/11 due to worsening mental status since 8/9. CT/CTA head negative for acute intracranial process. Repeat CT head 8/12 stable. Fords placed by vascular and started HD 8/11. Had another session HD 8/13.  Mental status significantly improved, aao x3, cleared for PO diet per s/s.    Ucx with GNR, but given improvement in mental status/ no leukocytosis or fevers over the last few days, monitoring off antibiotics and fu sensitivities      Originally presented for bilateral buttocks and sacrum deep Tissue Injury. Followed by burn, recommended for bedside debridement if indicated. Holding off antibiotics -  wounds without any gross signs of infection. Wound care.    Patient has hx of acute on chronic  anemia. History of AVMs (gastric and duodenal) and Dieulafoy Lesions Status Post Hemostasis in 2023. History of perisplenic Varices in 2023. S/p EGD/colonoscopy/push enteroscopy on 7/22 showing AVMs and polyps. S/p 1U pRBC on 8/2, s/p course of venofer. On weekly IV transfusion/iron infusions per hematology.   Will continue to monitor h/h  Keep active T&S  fu with GI outpatient    Patient has history of Chronic diastolic CHF and severe aortic stenosis   TTE Echo Complete w/o Contrast w/ Doppler (08.01.24 @ 11:03) >EF of 56 %. Mild asymmetric left ventricular hypertrophy. Grade II diastolic dysfunction). Severe aortic valve stenosis , Mild to moderate mitral valve regurgitation. Moderate aortic regurgitation.  Patient to follow up with Dr. Hernandez as an outpatient for TAVR  c/w metoprolol, added Midodrine 10 q8H  avoid fluid overload  OP fu with cardio Dr Hernandez     Transfer delayed due to hypotensive yesterday 8/13, BP down to 93/39 MAP 57. Given 500cc bolus, on midodrine 10mg tid. BPs remain soft today 90/42 MAP 60. Will continue to monitor BPs and consider going up on the midodrine.    Patient is DNR/ DNI with NIV  Plan discuss with son Domenico as he's the HCP    F/u: Ucx/sensitivities, Renal recommendations and HD per renal, labs, monitor PO intake, daily BMs on lactulose.

## 2024-08-14 NOTE — PATIENT PROFILE ADULT - TRANSPORTATION
What Is The Reason For Today's Visit?: History of Non-Melanoma Skin Cancer
How Many Skin Cancers Have You Had?: one
When Was Your Last Cancer Diagnosed?: 2023
no

## 2024-08-14 NOTE — PROGRESS NOTE ADULT - ASSESSMENT
78-year-old female with past medical history of   CKD, HTN, CCY, gout,  severe AS being planned for TAVR, and history of severe anemia secondary to chronic upper GI bleeding due to  AV malformation gastric body dieulafoy lesion, weekly blood transfusions presenting to ER for a rupture pressure ulcer of the buttock area.  #EVAN on CKD4/ anemia   #AMS  -s/p hd standard bath uf 1 liter after that will assess daily needs   - keep holding  diuretics  - non oliguric   - no hydro on renal/bladder sono   -  last phos noted; monitor  - continue  midodrine 10mg q8h   - d/c lokelma   - cont WOODY wkly  - oozing from udall/ dressing changed /omproved   - h/h noted / on WOODY on the floor if remains on hd will change to after HD   - neuro f/u   - overall prognosis poor   will follow
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
## 2024-08-14 NOTE — PROGRESS NOTE ADULT - ASSESSMENT
IMPRESSION:    -AMS sp stroke code/ head CT noted  -H/O CKD  -severe AS plan for TAVR   -B/L buttock wound   -Uremia SP HD  -Hyperkalemia-resolved       PLAN:      CNS: avoid sedation, neuro on board, f/u EEG noted, CT head noted     HEENT: Oral care    PULMONARY:  HOB @ 45 degrees. Aspiration precautions, wean oxygen, keep SaO2 92 TO 96%    CARDIOVASCULAR: avoid volume overload, MAP adequate, trend troponin, midodrine with HD     GI: GI prophylaxis.  Feeding. Ensure BM, on lactulose, 2-3BM/day goal      RENAL:  Follow up lytes. Correct as needed, nephro on board, plan for HD for uremia/EVAN, s/p 2 session     INFECTIOUS DISEASE: Follow up cultures, procalcitonin, f/u burn for debridment     HEMATOLOGICAL:  DVT prophylaxis    ENDOCRINE:  Follow up FS.  Insulin protocol if needed.    MUSCULOSKELETAL: ambulate as tolerated    poor prognosis, obtain labs     GOC

## 2024-08-14 NOTE — PATIENT PROFILE ADULT - FALL HARM RISK - HARM RISK INTERVENTIONS

## 2024-08-14 NOTE — PATIENT PROFILE ADULT - FUNCTIONAL ASSESSMENT - BASIC MOBILITY 3.
Reason for Disposition  • [1] Large swelling or lump > 1 inch (2.5 cm) AND [2] unexplained    Protocols used: SKIN - LUMP OR LOCALIZED SWELLING-P-AH     3 = A little assistance

## 2024-08-14 NOTE — PROGRESS NOTE ADULT - ASSESSMENT
78-year-old female with past medical history of   CKD, HTN, CCY, gout,  severe AS being planned for tAVR, and history of severe anemia secondary to chronic upper GI bleeding due to  AV malformation gastric body dieulafoy lesion, weekly blood transfusions presenting to ER for pressure ulcers of the buttock area.     # Bilateral buttocks pressure wounds:    - 8/13 s/p bedside debridement of bilateral buttocks by Dr Schwarz   - continue local wound care bid: wash wounds with soap and water, apply hydrogel, cover with Vashe moist gauze, then apply ABD and tape.  - pain management  - offloading, position changes  - rest of care as per primary team

## 2024-08-14 NOTE — PROGRESS NOTE ADULT - SUBJECTIVE AND OBJECTIVE BOX
Patient is a 78y old  Female who presents with a chief complaint of AMS (13 Aug 2024 10:03)        Over Night Events: No acute events, mentation slightly improved         ROS:     All ROS are negative except HPI         PHYSICAL EXAM    ICU Vital Signs Last 24 Hrs  T(C): 37.3 (14 Aug 2024 04:15), Max: 37.3 (13 Aug 2024 20:00)  T(F): 99.1 (14 Aug 2024 04:15), Max: 99.2 (13 Aug 2024 20:00)  HR: 67 (14 Aug 2024 04:15) (53 - 74)  BP: 94/44 (14 Aug 2024 04:15) (91/41 - 108/54)  BP(mean): 63 (13 Aug 2024 20:00) (59 - 71)  ABP: --  ABP(mean): --  RR: 18 (14 Aug 2024 04:15) (18 - 20)  SpO2: 94% (14 Aug 2024 04:15) (94% - 100%)    O2 Parameters below as of 14 Aug 2024 04:00  Patient On (Oxygen Delivery Method): room air      GENERAL:  NAD chronically ill appearing   SKIN: No rashes or lesions  HEENT: Atraumatic. Normocephalic.    NECK: Supple, No JVD.    PULMONARY: Coarse breath sounds  B/L. No wheezing. No rales  CVS: bren 2.6  ABDOMEN/GI: Soft, Nontender, Nondistended   MSK:  No clubbing or cyanosis   NEUROLOGIC: able to follow simple commands   PSYCH: sleepy, arousable, oriented to self     08-13-24 @ 07:01  -  08-14-24 @ 07:00  --------------------------------------------------------  IN:  Total IN: 0 mL    OUT:    Indwelling Catheter - Urethral (mL): 350 mL    Other (mL): 500 mL    Voided (mL): 325 mL  Total OUT: 1175 mL    Total NET: -1175 mL          LABS:                                                                                                                                                                                        Culture - Urine (collected 11 Aug 2024 12:55)  Source: Clean Catch Clean Catch (Midstream)  Preliminary Report (13 Aug 2024 16:08):    >100,000 CFU/ml Enterobacter cloacae complex                                                                                           MEDICATIONS  (STANDING):  acetaminophen     Tablet .. 1000 milliGRAM(s) Oral every 8 hours  chlorhexidine 2% Cloths 1 Application(s) Topical daily  cyanocobalamin Injectable 1000 MICROGram(s) IntraMuscular daily  epoetin raquel (EPOGEN) Injectable 39206 Unit(s) SubCutaneous every 7 days  folic acid 1 milliGRAM(s) Oral daily  heparin   Injectable 5000 Unit(s) SubCutaneous every 12 hours  lactulose Syrup 20 Gram(s) Oral every 6 hours  lidocaine 1%/epinephrine 1:100,000 Inj 2 Vial(s) Local Injection once  lidocaine 1%/epinephrine 1:100,000 Inj 3 Vial(s) Local Injection once  metoprolol tartrate 12.5 milliGRAM(s) Oral every 12 hours  midodrine. 10 milliGRAM(s) Oral three times a day  pantoprazole    Tablet 40 milliGRAM(s) Oral before breakfast  polyethylene glycol 3350 17 Gram(s) Oral daily  senna 2 Tablet(s) Oral at bedtime  sodium zirconium cyclosilicate 10 Gram(s) Oral daily    MEDICATIONS  (PRN):         Patient is a 78y old  Female who presents with a chief complaint of AMS (13 Aug 2024 10:03)        Over Night Events: No acute events, mentation slightly improved, low grade fever        PHYSICAL EXAM    ICU Vital Signs Last 24 Hrs  T(C): 37.3 (14 Aug 2024 04:15), Max: 37.3 (13 Aug 2024 20:00)  T(F): 99.1 (14 Aug 2024 04:15), Max: 99.2 (13 Aug 2024 20:00)  HR: 67 (14 Aug 2024 04:15) (53 - 74)  BP: 94/44 (14 Aug 2024 04:15) (91/41 - 108/54)  BP(mean): 63 (13 Aug 2024 20:00) (59 - 71)  RR: 18 (14 Aug 2024 04:15) (18 - 20)  SpO2: 94% (14 Aug 2024 04:15) (94% - 100%)    O2 Parameters below as of 14 Aug 2024 04:00  Patient On (Oxygen Delivery Method): room air      GENERAL:  NAD chronically ill appearing   SKIN: No rashes or lesions  HEENT: Atraumatic. Normocephalic.    NECK: Supple, No JVD.    PULMONARY: Coarse breath sounds  B/L. No wheezing. No rales  CVS: bren 2.6  ABDOMEN/GI: Soft, Nontender, Nondistended   MSK:  No clubbing or cyanosis   NEUROLOGIC: able to follow simple commands   PSYCH: sleepy, arousable, oriented to self     08-13-24 @ 07:01  -  08-14-24 @ 07:00  --------------------------------------------------------  IN:  Total IN: 0 mL    OUT:    Indwelling Catheter - Urethral (mL): 350 mL    Other (mL): 500 mL    Voided (mL): 325 mL  Total OUT: 1175 mL    Total NET: -1175 mL          LABS:                                                                                                                                                                                        Culture - Urine (collected 11 Aug 2024 12:55)  Source: Clean Catch Clean Catch (Midstream)  Preliminary Report (13 Aug 2024 16:08):    >100,000 CFU/ml Enterobacter cloacae complex                                                                                           MEDICATIONS  (STANDING):  acetaminophen     Tablet .. 1000 milliGRAM(s) Oral every 8 hours  chlorhexidine 2% Cloths 1 Application(s) Topical daily  cyanocobalamin Injectable 1000 MICROGram(s) IntraMuscular daily  epoetin raquel (EPOGEN) Injectable 06805 Unit(s) SubCutaneous every 7 days  folic acid 1 milliGRAM(s) Oral daily  heparin   Injectable 5000 Unit(s) SubCutaneous every 12 hours  lactulose Syrup 20 Gram(s) Oral every 6 hours  lidocaine 1%/epinephrine 1:100,000 Inj 2 Vial(s) Local Injection once  lidocaine 1%/epinephrine 1:100,000 Inj 3 Vial(s) Local Injection once  metoprolol tartrate 12.5 milliGRAM(s) Oral every 12 hours  midodrine. 10 milliGRAM(s) Oral three times a day  pantoprazole    Tablet 40 milliGRAM(s) Oral before breakfast  polyethylene glycol 3350 17 Gram(s) Oral daily  senna 2 Tablet(s) Oral at bedtime  sodium zirconium cyclosilicate 10 Gram(s) Oral daily    MEDICATIONS  (PRN):

## 2024-08-14 NOTE — PROGRESS NOTE ADULT - ASSESSMENT
78-year-old female with past medical history of   CKD, HTN, CCY, gout,  severe AS being planned for tAVR, and history of severe anemia secondary to chronic upper GI bleeding due to  AV malformation gastric body dieulafoy lesion, weekly blood transfusions presenting to ER for a rupture pressure ulcer of the buttock area. Hospital course complicated by worsening encephalopathy s/p urgent HD, upgraded to SDU      A/P   # Worsening metabolic encephalopathy /Acute kidney inury on CKD stage 4/ rhabdomyolysis / Hyperammonemia   - mental status improved now, hold off with brain MRI  - pt started on HD, will f/u nephrology recommendations and keep Udol for now   - CTAP done and reported-  No evidence of an intraperitoneal or retroperitoneal hematoma; CT evidence of anemia as above. Moderate rectal stool burden with associated rectal wall thickening and adjacent edema. Findings may reflect developing stercoral proctitis.  - patient started on Lactulose--> now having BM, goal 2-3 BM daily   - c/w Lokelma daily  - renal diet  - supportive care     # Bilateral buttocks and sacrum deep Tissue Injury  - ID following: hold antibiotics -  wounds without any gross signs of infection    - c/w wound care per burn, s/p debridement today   - off load pressure points, change position Q 2 hours   - consult dietitian     # Acute on chronic  anemia/ History of AVMs (gastric and duodenal) and Dieulafoy Lesions Status Post Hemostasis in 2023/ History of perisplenic Varices in 2023   - s/p EGD/colonoscopy/push enteroscopy on 7/22 showing AVMs and polyps  - s/p 1U pRBC on 8/2, s/p course of venofer  - on weekly IV transfusion/iron infusions per hematology  - monitor h/h, keep active T&S  - will likely give one unit of PRBC with HD in AM     # Chronic diastolic CHF/ Severe aortic stenosis   -  TTE Echo Complete w/o Contrast w/ Doppler (08.01.24 @ 11:03) >EF of 56 %. Mild asymmetric left ventricular hypertrophy. Grade II diastolic dysfunction). Severe aortic valve stenosis , Mild to moderate mitral valve regurgitation. Moderate aortic regurgitation.  - Patient to follow up with Dr. Hernandez as an outpatient for TAVR  - c/w metoprolol, c/w  Midodrine 10 q8H  - HD as scheduled       GOC : DNR/ DNI with NIV      Overall prognosis is guarded    Pending: debridement today, c/w current medical managment, nephro follow up to comment on RRT, consider blood transfusion if Hb drops below 7.0 in 24 hours, supportive care

## 2024-08-15 LAB
-  AMOXICILLIN/CLAVULANIC ACID: SIGNIFICANT CHANGE UP
-  AMPICILLIN/SULBACTAM: SIGNIFICANT CHANGE UP
-  AMPICILLIN: SIGNIFICANT CHANGE UP
-  AZTREONAM: SIGNIFICANT CHANGE UP
-  CEFAZOLIN: SIGNIFICANT CHANGE UP
-  CEFEPIME: SIGNIFICANT CHANGE UP
-  CEFOXITIN: SIGNIFICANT CHANGE UP
-  CEFTRIAXONE: SIGNIFICANT CHANGE UP
-  CIPROFLOXACIN: SIGNIFICANT CHANGE UP
-  ERTAPENEM: SIGNIFICANT CHANGE UP
-  GENTAMICIN: SIGNIFICANT CHANGE UP
-  IMIPENEM: SIGNIFICANT CHANGE UP
-  LEVOFLOXACIN: SIGNIFICANT CHANGE UP
-  MEROPENEM: SIGNIFICANT CHANGE UP
-  NITROFURANTOIN: SIGNIFICANT CHANGE UP
-  PIPERACILLIN/TAZOBACTAM: SIGNIFICANT CHANGE UP
-  TOBRAMYCIN: SIGNIFICANT CHANGE UP
-  TRIMETHOPRIM/SULFAMETHOXAZOLE: SIGNIFICANT CHANGE UP
ALBUMIN SERPL ELPH-MCNC: 2.6 G/DL — LOW (ref 3.5–5.2)
ALP SERPL-CCNC: 89 U/L — SIGNIFICANT CHANGE UP (ref 30–115)
ALT FLD-CCNC: 22 U/L — SIGNIFICANT CHANGE UP (ref 0–41)
ANION GAP SERPL CALC-SCNC: 11 MMOL/L — SIGNIFICANT CHANGE UP (ref 7–14)
AST SERPL-CCNC: 32 U/L — SIGNIFICANT CHANGE UP (ref 0–41)
BASOPHILS # BLD AUTO: 0.01 K/UL — SIGNIFICANT CHANGE UP (ref 0–0.2)
BASOPHILS NFR BLD AUTO: 0.1 % — SIGNIFICANT CHANGE UP (ref 0–1)
BILIRUB SERPL-MCNC: 0.6 MG/DL — SIGNIFICANT CHANGE UP (ref 0.2–1.2)
BUN SERPL-MCNC: 61 MG/DL — CRITICAL HIGH (ref 10–20)
CALCIUM SERPL-MCNC: 7.5 MG/DL — LOW (ref 8.4–10.5)
CHLORIDE SERPL-SCNC: 105 MMOL/L — SIGNIFICANT CHANGE UP (ref 98–110)
CO2 SERPL-SCNC: 25 MMOL/L — SIGNIFICANT CHANGE UP (ref 17–32)
CREAT SERPL-MCNC: 2.5 MG/DL — HIGH (ref 0.7–1.5)
CULTURE RESULTS: ABNORMAL
EGFR: 19 ML/MIN/1.73M2 — LOW
EOSINOPHIL # BLD AUTO: 0.52 K/UL — SIGNIFICANT CHANGE UP (ref 0–0.7)
EOSINOPHIL NFR BLD AUTO: 7.3 % — SIGNIFICANT CHANGE UP (ref 0–8)
GLUCOSE SERPL-MCNC: 105 MG/DL — HIGH (ref 70–99)
HCT VFR BLD CALC: 24 % — LOW (ref 37–47)
HGB BLD-MCNC: 7.3 G/DL — LOW (ref 12–16)
IMM GRANULOCYTES NFR BLD AUTO: 0.3 % — SIGNIFICANT CHANGE UP (ref 0.1–0.3)
IRON SATN MFR SERPL: 23 % — SIGNIFICANT CHANGE UP (ref 15–50)
IRON SATN MFR SERPL: 34 UG/DL — LOW (ref 35–150)
LYMPHOCYTES # BLD AUTO: 0.45 K/UL — LOW (ref 1.2–3.4)
LYMPHOCYTES # BLD AUTO: 6.3 % — LOW (ref 20.5–51.1)
MAGNESIUM SERPL-MCNC: 2.4 MG/DL — SIGNIFICANT CHANGE UP (ref 1.8–2.4)
MCHC RBC-ENTMCNC: 30.4 G/DL — LOW (ref 32–37)
MCHC RBC-ENTMCNC: 30.9 PG — SIGNIFICANT CHANGE UP (ref 27–31)
MCV RBC AUTO: 101.7 FL — HIGH (ref 81–99)
METHOD TYPE: SIGNIFICANT CHANGE UP
MONOCYTES # BLD AUTO: 0.54 K/UL — SIGNIFICANT CHANGE UP (ref 0.1–0.6)
MONOCYTES NFR BLD AUTO: 7.6 % — SIGNIFICANT CHANGE UP (ref 1.7–9.3)
NEUTROPHILS # BLD AUTO: 5.55 K/UL — SIGNIFICANT CHANGE UP (ref 1.4–6.5)
NEUTROPHILS NFR BLD AUTO: 78.4 % — HIGH (ref 42.2–75.2)
NRBC # BLD: 0 /100 WBCS — SIGNIFICANT CHANGE UP (ref 0–0)
ORGANISM # SPEC MICROSCOPIC CNT: ABNORMAL
ORGANISM # SPEC MICROSCOPIC CNT: SIGNIFICANT CHANGE UP
PHOSPHATE SERPL-MCNC: 3.8 MG/DL — SIGNIFICANT CHANGE UP (ref 2.1–4.9)
PLATELET # BLD AUTO: 144 K/UL — SIGNIFICANT CHANGE UP (ref 130–400)
PMV BLD: 10.1 FL — SIGNIFICANT CHANGE UP (ref 7.4–10.4)
POTASSIUM SERPL-MCNC: 3.9 MMOL/L — SIGNIFICANT CHANGE UP (ref 3.5–5)
POTASSIUM SERPL-SCNC: 3.9 MMOL/L — SIGNIFICANT CHANGE UP (ref 3.5–5)
PROT SERPL-MCNC: 4.7 G/DL — LOW (ref 6–8)
RBC # BLD: 2.36 M/UL — LOW (ref 4.2–5.4)
RBC # FLD: 20.8 % — HIGH (ref 11.5–14.5)
SODIUM SERPL-SCNC: 141 MMOL/L — SIGNIFICANT CHANGE UP (ref 135–146)
SPECIMEN SOURCE: SIGNIFICANT CHANGE UP
TIBC SERPL-MCNC: 150 UG/DL — LOW (ref 220–430)
UIBC SERPL-MCNC: 116 UG/DL — SIGNIFICANT CHANGE UP (ref 110–370)
WBC # BLD: 7.09 K/UL — SIGNIFICANT CHANGE UP (ref 4.8–10.8)
WBC # FLD AUTO: 7.09 K/UL — SIGNIFICANT CHANGE UP (ref 4.8–10.8)

## 2024-08-15 PROCEDURE — 99232 SBSQ HOSP IP/OBS MODERATE 35: CPT

## 2024-08-15 PROCEDURE — 99233 SBSQ HOSP IP/OBS HIGH 50: CPT

## 2024-08-15 RX ORDER — BUMETANIDE 2 MG/1
1 TABLET ORAL EVERY 12 HOURS
Refills: 0 | Status: DISCONTINUED | OUTPATIENT
Start: 2024-08-15 | End: 2024-08-22

## 2024-08-15 RX ADMIN — Medication 20 GRAM(S): at 12:41

## 2024-08-15 RX ADMIN — Medication 40 MILLIGRAM(S): at 05:09

## 2024-08-15 RX ADMIN — Medication 2 TABLET(S): at 21:03

## 2024-08-15 RX ADMIN — Medication 20 GRAM(S): at 05:09

## 2024-08-15 RX ADMIN — ACETAMINOPHEN 1000 MILLIGRAM(S): 325 TABLET ORAL at 07:22

## 2024-08-15 RX ADMIN — ACETAMINOPHEN 1000 MILLIGRAM(S): 325 TABLET ORAL at 13:59

## 2024-08-15 RX ADMIN — SUCRALFATE 1 GRAM(S): 1 SUSPENSION ORAL at 05:08

## 2024-08-15 RX ADMIN — ACETAMINOPHEN 1000 MILLIGRAM(S): 325 TABLET ORAL at 05:09

## 2024-08-15 RX ADMIN — Medication 20 GRAM(S): at 17:04

## 2024-08-15 RX ADMIN — Medication 1 MILLIGRAM(S): at 12:41

## 2024-08-15 RX ADMIN — ACETAMINOPHEN 1000 MILLIGRAM(S): 325 TABLET ORAL at 13:24

## 2024-08-15 RX ADMIN — BUMETANIDE 1 MILLIGRAM(S): 2 TABLET ORAL at 21:03

## 2024-08-15 RX ADMIN — MIDODRINE HYDROCHLORIDE 10 MILLIGRAM(S): 5 TABLET ORAL at 12:45

## 2024-08-15 RX ADMIN — Medication 5000 UNIT(S): at 17:04

## 2024-08-15 RX ADMIN — MIDODRINE HYDROCHLORIDE 10 MILLIGRAM(S): 5 TABLET ORAL at 05:09

## 2024-08-15 RX ADMIN — METOPROLOL TARTRATE 12.5 MILLIGRAM(S): 100 TABLET ORAL at 05:09

## 2024-08-15 RX ADMIN — CHLORHEXIDINE GLUCONATE 1 APPLICATION(S): 40 SOLUTION TOPICAL at 12:41

## 2024-08-15 RX ADMIN — Medication 5000 UNIT(S): at 05:09

## 2024-08-15 RX ADMIN — POLYETHYLENE GLYCOL 3350 17 GRAM(S): 17 POWDER, FOR SOLUTION ORAL at 12:41

## 2024-08-15 RX ADMIN — ACETAMINOPHEN 1000 MILLIGRAM(S): 325 TABLET ORAL at 21:03

## 2024-08-15 RX ADMIN — SUCRALFATE 1 GRAM(S): 1 SUSPENSION ORAL at 17:04

## 2024-08-15 RX ADMIN — MIDODRINE HYDROCHLORIDE 10 MILLIGRAM(S): 5 TABLET ORAL at 17:04

## 2024-08-15 RX ADMIN — ACETAMINOPHEN 1000 MILLIGRAM(S): 325 TABLET ORAL at 21:33

## 2024-08-15 NOTE — PROGRESS NOTE ADULT - SUBJECTIVE AND OBJECTIVE BOX
seen and examined  24 h events noted   no distress       PAST HISTORY  --------------------------------------------------------------------------------  No significant changes to PMH, PSH, FHx, SHx, unless otherwise noted    ALLERGIES & MEDICATIONS  --------------------------------------------------------------------------------  Allergies    aspirin (Rash; Other)  penicillins (Rash; Urticaria; Hives; Blisters)  latex (Unknown)  EKG leads (Hives)    Intolerances      Standing Inpatient Medications  acetaminophen     Tablet .. 1000 milliGRAM(s) Oral every 8 hours  chlorhexidine 2% Cloths 1 Application(s) Topical daily  epoetin raquel (EPOGEN) Injectable 03545 Unit(s) SubCutaneous every 7 days  folic acid 1 milliGRAM(s) Oral daily  heparin   Injectable 5000 Unit(s) SubCutaneous every 12 hours  lactulose Syrup 20 Gram(s) Oral every 6 hours  lidocaine 1%/epinephrine 1:100,000 Inj 2 Vial(s) Local Injection once  lidocaine 1%/epinephrine 1:100,000 Inj 3 Vial(s) Local Injection once  metoprolol tartrate 12.5 milliGRAM(s) Oral every 12 hours  midodrine. 10 milliGRAM(s) Oral three times a day  pantoprazole    Tablet 40 milliGRAM(s) Oral before breakfast  polyethylene glycol 3350 17 Gram(s) Oral daily  senna 2 Tablet(s) Oral at bedtime  sucralfate 1 Gram(s) Oral every 12 hours      VITALS/PHYSICAL EXAM  --------------------------------------------------------------------------------  T(C): 37 (08-15-24 @ 08:15), Max: 37.5 (08-15-24 @ 04:00)  HR: 63 (08-15-24 @ 08:55) (60 - 66)  BP: 123/49 (08-15-24 @ 08:55) (94/46 - 123/49)  RR: 18 (08-15-24 @ 08:55) (18 - 20)  SpO2: 98% (08-15-24 @ 08:15) (96% - 100%)  Wt(kg): --        08-14-24 @ 07:01  -  08-15-24 @ 07:00  --------------------------------------------------------  IN: 0 mL / OUT: 400 mL / NET: -400 mL      Physical Exam:  	Gen: NAD  	Pulm: decrease BS  B/L  	CV: S1S2; no rub  	Abd: +distended  	LE: edema  	Vascular access:    LABS/STUDIES  --------------------------------------------------------------------------------              7.3    7.09  >-----------<  144      [08-15-24 @ 05:53]              24.0     141  |  105  |  61  ----------------------------<  105      [08-15-24 @ 05:53]  3.9   |  25  |  2.5        Ca     7.5     [08-15-24 @ 05:53]      Mg     2.4     [08-15-24 @ 05:53]      Phos  3.8     [08-15-24 @ 05:53]    TPro  4.7  /  Alb  2.6  /  TBili  0.6  /  DBili  x   /  AST  32  /  ALT  22  /  AlkPhos  89  [08-15-24 @ 05:53]      Creatinine Trend:  SCr 2.5 [08-15 @ 05:53]  SCr 2.2 [08-14 @ 12:02]  SCr 2.1 [08-14 @ 07:26]  SCr 2.5 [08-12 @ 06:30]  SCr 2.5 [08-12 @ 03:55]    Urinalysis - [08-15-24 @ 05:53]      Color  / Appearance  / SG  / pH       Gluc 105 / Ketone   / Bili  / Urobili        Blood  / Protein  / Leuk Est  / Nitrite       RBC  / WBC  / Hyaline  / Gran  / Sq Epi  / Non Sq Epi  / Bacteria       Iron 34, TIBC 150, %sat 23      [08-15-24 @ 10:36]  Ferritin 170      [08-01-24 @ 14:29]  TSH 4.36      [08-08-24 @ 09:40]    HBsAb Nonreact      [08-11-24 @ 11:39]  HBsAg Nonreact      [08-11-24 @ 11:39]  HBcAb Nonreact      [08-11-24 @ 11:39]

## 2024-08-15 NOTE — SWALLOW BEDSIDE ASSESSMENT ADULT - SLP PERTINENT HISTORY OF CURRENT PROBLEM
78-year-old female with past medical history of CKD, HTN, CCY, gout,  severe AS being planned for tAVR, and history of severe anemia secondary to chronic upper GI bleeding due to  AV malformation gastric body dieulafoy lesion, weekly blood transfusions presenting to ER for a rupture pressure ulcer of the buttock area. Hospital course complicated by worsening encephalopathy s/p urgent HD, upgraded to SDUWorsening metabolic encephalopathy - possibly uremic? patient with worsening mental status since 8/9, s/p RRT and code stroke 8/11CTH no acute process
78-year-old female with past medical history of   CKD, HTN, CCY, gout,  severe AS being planned for tAVR, and history of severe anemia secondary to chronic upper GI bleeding due to  AV malformation gastric body dieulafoy lesion, weekly blood transfusions presenting to ER for a rupture pressure ulcer of the buttock area. Hospital course complicated by worsening encephalopathy s/p urgent HD, upgraded to SDUWorsening metabolic encephalopathy - possibly uremic? patient wqith worsening mental status since 8/9, s/p RRT and code stroke 8/11CTH no acute process
78-year-old female with past medical history of CKD, HTN, CCY, gout,  severe AS being planned for tAVR, and history of severe anemia secondary to chronic upper GI bleeding due to  AV malformation gastric body dieulafoy lesion, weekly blood transfusions presenting to ER for a rupture pressure ulcer of the buttock area. Hospital course complicated by worsening encephalopathy s/p urgent HD, upgraded to SDUWorsening metabolic encephalopathy - possibly uremic? patient with worsening mental status since 8/9, s/p RRT and code stroke 8/11CTH no acute process

## 2024-08-15 NOTE — SWALLOW BEDSIDE ASSESSMENT ADULT - SWALLOW EVAL: DIAGNOSIS
+toleration observed without overt symptoms of penetration/aspiration for soft chewables and thin liquids
+ toleration observed without overt symptoms of penetration/aspiration for puree and thin liquids; chewables not trialed 2/2 pt edentulous and AMS
+ toleration observed without overt symptoms of penetration/aspiration for soft chewables and thin liquids

## 2024-08-15 NOTE — PROGRESS NOTE ADULT - ASSESSMENT
78-year-old female with past medical history of   CKD, HTN, CCY, gout,  severe AS being planned for tAVR, and history of severe anemia secondary to chronic upper GI bleeding due to  AV malformation gastric body dieulafoy lesion, weekly blood transfusions presenting to ER for a rupture pressure ulcer of the buttock area. Hospital course complicated by worsening encephalopathy s/p urgent HD, upgraded to SDU      A/P   # Worsening metabolic encephalopathy /Acute kidney inury on CKD stage 4/ rhabdomyolysis / Hyperammonemia   - mental status improved now, no indications for  brain MRI  - pt started on HD ( last HD today),  nephrology is following, keep Udol for now   - CTAP done and reported-  No evidence of an intraperitoneal or retroperitoneal hematoma; CT evidence of anemia as above. Moderate rectal stool burden with associated rectal wall thickening and adjacent edema. Findings may reflect developing stercoral proctitis.  - patient started on Lactulose--> now having BM, goal 2-3 BM daily   - c/w Lokelma daily  - renal diet  - supportive care     # Bilateral buttocks and sacrum deep Tissue Injury  - ID following: hold antibiotics -  wounds without any gross signs of infection    - c/w wound care per burn, s/p debridement on 8/14  - off load pressure points, change position Q 2 hours   - consult dietitian     # Acute on chronic  anemia/ History of AVMs (gastric and duodenal) and Dieulafoy Lesions Status Post Hemostasis in 2023/ History of perisplenic Varices in 2023   - s/p EGD/colonoscopy/push enteroscopy on 7/22 showing AVMs and polyps  - s/p 1U pRBC on 8/2, s/p course of venofer  - on weekly IV transfusion/iron infusions per hematology  - monitor h/h, keep active T&S  - keep Hb above 7.0     # Chronic diastolic CHF/ Severe aortic stenosis   -  TTE Echo Complete w/o Contrast w/ Doppler (08.01.24 @ 11:03) >EF of 56 %. Mild asymmetric left ventricular hypertrophy. Grade II diastolic dysfunction). Severe aortic valve stenosis , Mild to moderate mitral valve regurgitation. Moderate aortic regurgitation.  - Patient to follow up with Dr. Hernandez as an outpatient for TAVR  - c/w metoprolol, c/w  Midodrine 10 q8H  - HD as scheduled       GOC : DNR/ DNI with NIV      Overall prognosis is guarded    #Progress Note Handoff  Pending (specify):  monitor H/H, keep Hb above 7.0, supportive care, nephro follow up to comment on HD ( pt has right groin Udol), PT/rehab evaluation   Family discussion: medical team spoke with pt's son   Disposition: DGTF

## 2024-08-15 NOTE — PROGRESS NOTE ADULT - SUBJECTIVE AND OBJECTIVE BOX
78-year-old female with past medical history of CKD, HTN, CCY, gout, severe AS being planned for tAVR, and history of severe anemia secondary to chronic upper GI bleeding due to  AV malformation gastric body dieulafoy lesion, weekly blood transfusions presenting to ER for a rupture pressure ulcer of the buttock area today.   Patient states that last night was on her toilet which cracked while she was sitting on it, and patient was unable to get off the toilet for 4 hours.  By the time EMS arrived skin had ulcerated, with a blister that ruptured predominantly to the left buttock area (appears to be like burn blister that ruptured) with linear abrasions to the bilateral upper thighs/buttocks.  Patient in so much pain that she cannot stand or walk, Of note, pt was recently admitted to the hospital between - for hematochezia, s/p 1 unit prbc. Denies fever, chills. N/V, abd pain.   Hospital course complicated by EVAN and worsening encephalopathy, possibly due to uremia, s/p urgent HD, upgraded to SDU. Ammonia level 206. CK 2700, now improved. Hyperkalemia, improved on lokelma. EVAN improved, back to baseline creat 2.5. CTAP done and reported-  No evidence of an intraperitoneal or retroperitoneal hematoma; CT evidence of anemia as above. Moderate rectal stool burden with associated rectal wall thickening and adjacent edema. Findings may reflect developing stercoral proctitis. Patient started on lactulose, which resolved constipation. Goal 2 bowel movements per day.  Code stroke called  due to worsening mental status since . CT/CTA head negative for acute intracranial process. Repeat CT head  stable. [place] placed by vascular and started HD . Had another session HD .  Mental status significantly improved, aao x3, cleared for PO diet per s/s.  Originally presented for bilateral buttocks and sacrum deep Tissue Injury. Followed by burn, recommended for bedside debridement.   Patient has hx of   anemia. History of AVMs (gastric and duodenal) and Dieulafoy Lesions Status Post Hemostasis in . History of perisplenic Varices in . S/p EGD/colonoscopy/push enteroscopy on  showing AVMs and polyps. S/p1U pRBC on , s/p course of venofer. On weekly IV transfusion/iron infusions per hematology.   Patient has history of Chronic diastolic CHF and severe aortic stenosis , OP  with Dr. Hernandez recommended  for TAVR  Patient is DNR/ DNI with NIV.   Today pt is awake and alert, feels OK, denies any specific complaints.     PAST MEDICAL & SURGICAL HISTORY:  HTN (hypertension)  Heart murmur  Eczema  Anemia  iron infusions and PRBC transfusions  Aortic stenosis  Chronic kidney disease (CKD)  2019 novel coronavirus disease (COVID-19)  2020- REHAB admission  Gastric AVM  H/O appendicitis  S/P cholecystectomy  History of esophagogastroduodenoscopy (EGD)  H/O colonoscopy  H/O  section  History of appendectomy  Port-A-Cath in place  right CW      Vital Signs Last 24 Hrs  T(C): 36.8 (15 Aug 2024 12:36), Max: 37.5 (15 Aug 2024 04:00)  T(F): 98.2 (15 Aug 2024 12:36), Max: 99.5 (15 Aug 2024 04:00)  HR: 74 (15 Aug 2024 12:36) (63 - 74)  BP: 110/49 (15 Aug 2024 12:36) (97/41 - 148/42)  BP(mean): 71 (15 Aug 2024 12:36) (59 - 72)  RR: 20 (15 Aug 2024 12:36) (18 - 20)  SpO2: 96% (15 Aug 2024 12:36) (96% - 100%)    Parameters below as of 15 Aug 2024 12:36  Patient On (Oxygen Delivery Method): room air          PHYSICAL EXAM:  GENERAL: NAD, awake answering questions   HEAD:  Atraumatic, Normocephalic  NECK: Supple, No JVD, Normal thyroid  NERVOUS SYSTEM:  Alert & Oriented X3, following commands, answering questions   CHEST/LUNG: decreased BS at bases, chemo port noted right chest   HEART: Regular rate and rhythm; No murmurs, rubs, or gallops  ABDOMEN: Soft, Nontender, Nondistended; Bowel sounds present  EXTREMITIES:  2+ Peripheral Pulses, No clubbing, cyanosis, or edema, Udol noted in right groin         LABS:                                   7.3    7.09  )-----------( 144      ( 15 Aug 2024 05:53 )             24.0   08-15    141  |  105  |  61<HH>  ----------------------------<  105<H>  3.9   |  25  |  2.5<H>    Ca    7.5<L>      15 Aug 2024 05:53  Phos  3.8     08-15  Mg     2.4     08-15    TPro  4.7<L>  /  Alb  2.6<L>  /  TBili  0.6  /  DBili  x   /  AST  32  /  ALT  22  /  AlkPhos  89  08-15        Urinalysis Basic - ( 14 Aug 2024 12:02 )    Color: x / Appearance: x / SG: x / pH: x  Gluc: 92 mg/dL / Ketone: x  / Bili: x / Urobili: x   Blood: x / Protein: x / Nitrite: x   Leuk Esterase: x / RBC: x / WBC x   Sq Epi: x / Non Sq Epi: x / Bacteria: x      Culture - Urine (24 @ 12:55)   Specimen Source: Clean Catch Clean Catch (Midstream)  Culture Results:   >100,000 CFU/ml Enterobacter cloacae complex   Susceptibility to follow.      RADIOLOGY & ADDITIONAL TESTS:      < from: CT Head No Cont (24 @ 21:55) >  IMPRESSION:    Unremarkable study. Stable since .    < end of copied text >  < from: [facility] Duplex Lower Ext Vein Scan, Bilat (24 @ 17:17) >    IMPRESSION:  No evidence of deep venous thrombosis in either lower extremity.    < end of copied text >  < from: Xray Kidney Ureter Bladder (24 @ 01:24) >  FINDINGS/  IMPRESSION:    Moderate colonic stool. Nonspecific nonobstructive bowel gas pattern.   Degenerative changes in the spine, sacroiliac joints and bilateral hips.   Right femoral dialysis catheter tip projecting over the sacrum, likely   common iliac vein or IVC.    < end of copied text >  < from: CT Angio Brain Stroke Protocol  w/ IV Cont (24 @ 09:41) >    IMPRESSION:    CT PERFUSION: No perfusion abnormality.    CTA HEAD/NECK: No evidence of acute large vessel occlusion. 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 Mild   scattered atheromatous changes as above.    < end of copied text >  < from: TTE Echo Complete w/o Contrast w/ Doppler (24 @ 11:03) >  Summary:   1. Normal global left ventricular systolic function.   2. LV Ejection Fraction by Brown's Method with a biplane EF of 56 %.   3. Mild asymmetric left ventricular hypertrophy (13 mm).   4. Spectral Doppler shows pseudonormal pattern of left ventricular   myocardial filling (Grade II diastolic dysfunction). Elevated mean left   atrial pressure.   5. Normal right ventricular size and function.   6. Severe aortic valve stenosis (VMax 5.92 m/s, PG//90 mmHg, SHILA   0.88 cm2).   7. Moderate aortic regurgitation.   8. Mild to moderate mitral valve regurgitation.   9.Mild tricuspid regurgitation.  10. Estimated pulmonary artery systolic pressure is 39.4 mmHg assuming a   right atrial pressure of 13 mmHg, which is consistent with borderline   pulmonary hypertension.    MEDICATIONS  (STANDING):  acetaminophen     Tablet .. 1000 milliGRAM(s) Oral every 8 hours  buMETAnide 1 milliGRAM(s) Oral every 12 hours  chlorhexidine 2% Cloths 1 Application(s) Topical daily  epoetin raquel (EPOGEN) Injectable 60030 Unit(s) SubCutaneous every 7 days  folic acid 1 milliGRAM(s) Oral daily  heparin   Injectable 5000 Unit(s) SubCutaneous every 12 hours  lactulose Syrup 20 Gram(s) Oral every 6 hours  lidocaine 1%/epinephrine 1:100,000 Inj 2 Vial(s) Local Injection once  lidocaine 1%/epinephrine 1:100,000 Inj 3 Vial(s) Local Injection once  metoprolol tartrate 12.5 milliGRAM(s) Oral every 12 hours  midodrine. 10 milliGRAM(s) Oral three times a day  pantoprazole    Tablet 40 milliGRAM(s) Oral before breakfast  polyethylene glycol 3350 17 Gram(s) Oral daily  senna 2 Tablet(s) Oral at bedtime  sucralfate 1 Gram(s) Oral every 12 hours

## 2024-08-15 NOTE — CHART NOTE - NSCHARTNOTEFT_GEN_A_CORE
Transfer from stepdown to floors    Hospital Course:  Patient is a 78-year-old female with past medical history of CKD, HTN, CCY, gout, severe AS being planned for tAVR, and history of severe anemia secondary to chronic upper GI bleeding due to  AV malformation gastric body dieulafoy lesion, weekly blood transfusions presenting to ER for a rupture pressure ulcer of the buttock area today.   Patient states that last night was on her toilet which cracked while she was sitting on it, and patient was unable to get off the toilet for 4 hours.  By the time EMS arrived skin had ulcerated, with a blister that ruptured predominantly to the left buttock area (appears to be like burn blister that ruptured) with linear abrasions to the bilateral upper thighs/buttocks.  Patient in so much pain that she cannot stand or walk, Of note, pt was recently admitted to the hospital between 7/13-7/23 for hematochezia, s/p 1 unit prbc. Denies fever, chills. N/V, abd pain.     ED Vitals:  T(C): 36.4 (30 Jul 2024 00:51), Max: 37.1 (29 Jul 2024 18:30)  T(F): 97.6 (30 Jul 2024 00:51), Max: 98.7 (29 Jul 2024 18:30)  HR: 65 (30 Jul 2024 00:51) (59 - 65)  BP: 146/69 (30 Jul 2024 00:51) (112/46 - 146/69)  RR: 18 (30 Jul 2024 00:51) (18 - 18)  SpO2: 99% (30 Jul 2024 00:51) (95% - 99%)    Labs significant for wbc 11k , hgb 9.1 , Cr 3.5 (baseline 2-3). Admitted for wound management and placement as pt can not care for her own ADLs. Hospital course complicated by EVAN and worsening encephalopathy, possibly due to uremia, s/p urgent HD, upgraded to SDU. Ammonia level 206. CK 2700, now improved. Hyperkalemia, improved on lokelma. EVAN improved, back to baseline creat 2.5. CTAP done and reported-  No evidence of an intraperitoneal or retroperitoneal hematoma; CT evidence of anemia as above. Moderate rectal stool burden with associated rectal wall thickening and adjacent edema. Findings may reflect developing stercoral proctitis. Patient started on lactulose, which resolved constipation. Goal 2 bowel movements per day.    Code stroke called 8/11 due to worsening mental status since 8/9. CT/CTA head negative for acute intracranial process. Repeat CT head 8/12 stable. [place] placed by vascular and started HD 8/11. Had another session HD 8/13.  Mental status significantly improved, aao x3, cleared for PO diet per s/s.    Ucx with GNR, but given improvement in mental status/ no leukocytosis or fevers over the last few days, monitoring off antibiotics and fu sensitivities      AMS likely due to metabolic encephalopathy and has improved s/p three sessions of HD. Patient also now having regular bowel movements on lactulose, can consider weaning off lactulose with goal of 2 BMs per day.  Creatinine remains elevated at 2.5 from 2.2 yesterday (baseline 1.8).  Received HD 8/11, 8/13, 8/15.  S/p lokelma for Hyperkalemia, no dced per nephro  Nephro following, f/u recs  Consider removing Right femoral [place] once HD is no longer indicated, per nephro    Originally presented for bilateral buttocks and sacrum deep Tissue Injury.   Bedside debridement done by burn 8/14.  Holding off antibiotics -  wounds without any gross signs of infection.   Wound care ordered, per burn recs.  Burn following.    Patient has hx of acute on chronic  anemia. History of AVMs (gastric and duodenal) and Dieulafoy Lesions Status Post Hemostasis in 2023. History of perisplenic Varices in 2023. S/p EGD/colonoscopy/push enteroscopy on 7/22 showing AVMs and polyps. S/p 1U pRBC on 8/2, s/p course of venofer. On weekly IV transfusion/iron infusions per hematology.   Will continue to monitor h/h, currently Hgb 7.3 stable from yesterday  Monitor for bleeding, blood in stool, currently no signs of active bleeding  Keep active T&S every 72 hours (last one 8/14)  Macrocytic anemia noted, s/p 5 day course of b12 and receiving folate  Will recheck iron panel, f/u and consider another course of venofer as she receives weekly iron infusions per hematology  fu with GI outpatient    Patient has history of Chronic diastolic CHF and severe aortic stenosis   TTE Echo Complete w/o Contrast w/ Doppler (08.01.24 @ 11:03) >EF of 56 %. Mild asymmetric left ventricular hypertrophy. Grade II diastolic dysfunction). Severe aortic valve stenosis , Mild to moderate mitral valve regurgitation. Moderate aortic regurgitation.  Patient to follow up with Dr. Hernandez as an outpatient for TAVR  c/w metoprolol, added Midodrine 10 q8H  avoid fluid overload  OP fu with cardio Dr Hernandez     Patient has been hypotensive yesterday 8/13, BP down to 93/39 MAP 57. On midodrine 10mg tid. BPs have since improved, currently 123/49.    Patient is DNR/ DNI with NIV  Plan discuss with son Domenico as he's the HCP    F/u: Ucx/sensitivities, Renal recommendations and HD per renal, labs, monitor PO intake, daily BMs on lactulose. Transfer from stepdown to floors    Hospital Course:  Patient is a 78-year-old female with past medical history of CKD, HTN, CCY, gout, severe AS being planned for tAVR, and history of severe anemia secondary to chronic upper GI bleeding due to  AV malformation gastric body dieulafoy lesion, weekly blood transfusions presenting to ER for a rupture pressure ulcer of the buttock area today.   Patient states that last night was on her toilet which cracked while she was sitting on it, and patient was unable to get off the toilet for 4 hours.  By the time EMS arrived skin had ulcerated, with a blister that ruptured predominantly to the left buttock area (appears to be like burn blister that ruptured) with linear abrasions to the bilateral upper thighs/buttocks.  Patient in so much pain that she cannot stand or walk, Of note, pt was recently admitted to the hospital between 7/13-7/23 for hematochezia, s/p 1 unit prbc. Denies fever, chills. N/V, abd pain.     ED Vitals:  T(C): 36.4 (30 Jul 2024 00:51), Max: 37.1 (29 Jul 2024 18:30)  T(F): 97.6 (30 Jul 2024 00:51), Max: 98.7 (29 Jul 2024 18:30)  HR: 65 (30 Jul 2024 00:51) (59 - 65)  BP: 146/69 (30 Jul 2024 00:51) (112/46 - 146/69)  RR: 18 (30 Jul 2024 00:51) (18 - 18)  SpO2: 99% (30 Jul 2024 00:51) (95% - 99%)    Labs significant for wbc 11k , hgb 9.1 , Cr 3.5 (baseline 2-3). Admitted for wound management and placement as pt can not care for her own ADLs. Hospital course complicated by EVAN and worsening encephalopathy, possibly due to uremia, s/p urgent HD, upgraded to SDU. Ammonia level 206. CK 2700, now improved. Hyperkalemia, improved on lokelma. EVAN improved, back to baseline creat 2.5. CTAP done and reported-  No evidence of an intraperitoneal or retroperitoneal hematoma; CT evidence of anemia as above. Moderate rectal stool burden with associated rectal wall thickening and adjacent edema. Findings may reflect developing stercoral proctitis. Patient started on lactulose, which resolved constipation. Goal 2 bowel movements per day.    Code stroke called 8/11 due to worsening mental status since 8/9. CT/CTA head negative for acute intracranial process. Repeat CT head 8/12 stable. [place] placed by vascular and started HD 8/11. Had another session HD 8/13.  Mental status significantly improved, aao x3, cleared for PO diet per s/s.    Ucx 8/11 with GNR Enterbacter Cloacae, but given improvement in mental status/ no leukocytosis or fevers, monitoring off antibiotics. Sensitivities noted.    AMS likely due to metabolic encephalopathy and has improved s/p three sessions of HD. Patient also now having regular bowel movements on lactulose, can consider weaning off lactulose with goal of 2 BMs per day.  Creatinine remains elevated at 2.5 from 2.2 yesterday (baseline 1.8).  Received HD 8/11, 8/13, 8/15.  S/p lokelma for Hyperkalemia, no dced per nephro  Nephro following, f/u recs  Consider removing Right femoral [place] once HD is no longer indicated, per nephro    Originally presented for bilateral buttocks and sacrum deep Tissue Injury.   Bedside debridement done by burn 8/14.  Holding off antibiotics -  wounds without any gross signs of infection.   Wound care ordered, per burn recs.  Burn following.    Patient has hx of acute on chronic  anemia. History of AVMs (gastric and duodenal) and Dieulafoy Lesions Status Post Hemostasis in 2023. History of perisplenic Varices in 2023. S/p EGD/colonoscopy/push enteroscopy on 7/22 showing AVMs and polyps. S/p 1U pRBC on 8/2, s/p course of venofer. On weekly IV transfusion/iron infusions per hematology.   Will continue to monitor h/h, currently Hgb 7.3 stable from yesterday  Monitor for bleeding, blood in stool, currently no signs of active bleeding  Keep active T&S every 72 hours (last one 8/14)  Macrocytic anemia noted, s/p 5 day course of b12 and receiving folate  Will recheck iron panel, f/u and consider another course of venofer as she receives weekly iron infusions per hematology  fu with GI outpatient    Patient has history of Chronic diastolic CHF and severe aortic stenosis   TTE Echo Complete w/o Contrast w/ Doppler (08.01.24 @ 11:03) >EF of 56 %. Mild asymmetric left ventricular hypertrophy. Grade II diastolic dysfunction). Severe aortic valve stenosis , Mild to moderate mitral valve regurgitation. Moderate aortic regurgitation.  Patient to follow up with Dr. Hernandez as an outpatient for TAVR  c/w metoprolol, added Midodrine 10 q8H  avoid fluid overload  OP fu with cardio Dr Hernandez     Patient has been hypotensive yesterday 8/13, BP down to 93/39 MAP 57. On midodrine 10mg tid. BPs have since improved, currently 123/49.    Patient is DNR/ DNI with NIV  Plan discuss with son Domenico as he's the HCP    F/u: Renal recommendations, last HD 8/15, labs, monitor PO intake, Goal of 2 BMs per day on lactulose, f/u burn recommendations for bilateral buttocks and sacrum wound s/p bedside debridement. Transfer from stepdown to floors    Hospital Course:  Patient is a 78-year-old female with past medical history of CKD, HTN, CCY, gout, severe AS being planned for tAVR, and history of severe anemia secondary to chronic upper GI bleeding due to  AV malformation gastric body dieulafoy lesion, weekly blood transfusions presenting to ER for a rupture pressure ulcer of the buttock area today.   Patient states that last night was on her toilet which cracked while she was sitting on it, and patient was unable to get off the toilet for 4 hours.  By the time EMS arrived skin had ulcerated, with a blister that ruptured predominantly to the left buttock area (appears to be like burn blister that ruptured) with linear abrasions to the bilateral upper thighs/buttocks.  Patient in so much pain that she cannot stand or walk, Of note, pt was recently admitted to the hospital between 7/13-7/23 for hematochezia, s/p 1 unit prbc. Denies fever, chills. N/V, abd pain.     ED Vitals:  T(C): 36.4 (30 Jul 2024 00:51), Max: 37.1 (29 Jul 2024 18:30)  T(F): 97.6 (30 Jul 2024 00:51), Max: 98.7 (29 Jul 2024 18:30)  HR: 65 (30 Jul 2024 00:51) (59 - 65)  BP: 146/69 (30 Jul 2024 00:51) (112/46 - 146/69)  RR: 18 (30 Jul 2024 00:51) (18 - 18)  SpO2: 99% (30 Jul 2024 00:51) (95% - 99%)    Labs significant for wbc 11k , hgb 9.1 , Cr 3.5 (baseline 2-3). Admitted for wound management and placement as pt can not care for her own ADLs. Hospital course complicated by EVAN and worsening encephalopathy, possibly due to uremia, s/p urgent HD, upgraded to SDU. Ammonia level 206. CK 2700, now improved. Hyperkalemia, improved on lokelma. EVAN improved, back to baseline creat 2.5. CTAP done and reported-  No evidence of an intraperitoneal or retroperitoneal hematoma; CT evidence of anemia as above. Moderate rectal stool burden with associated rectal wall thickening and adjacent edema. Findings may reflect developing stercoral proctitis. Patient started on lactulose, which resolved constipation. Goal 2 bowel movements per day.    Code stroke called 8/11 due to worsening mental status since 8/9. CT/CTA head negative for acute intracranial process. Repeat CT head 8/12 stable. [place] placed by vascular and started HD 8/11. Had another session HD 8/13.  Mental status significantly improved, aao x3, cleared for PO diet per s/s.    Ucx 8/11 with GNR Enterbacter Cloacae, but given improvement in mental status/ no leukocytosis or fevers, monitoring off antibiotics. Sensitivities noted.    AMS likely due to metabolic encephalopathy and has improved s/p three sessions of HD. Patient also now having regular bowel movements on lactulose, can consider weaning off lactulose with goal of 2 BMs per day.  Creatinine remains elevated at 2.5 from 2.2 yesterday (baseline 1.8).  Received HD 8/11, 8/13, 8/15.  S/p lokelma for Hyperkalemia, no dced per nephro  Nephro following, f/u recs  Consider removing Right femoral [place] once HD is no longer indicated, per nephro    Originally presented for bilateral buttocks and sacrum deep Tissue Injury.   Bedside debridement done by burn 8/13, no plans for further debridement per burn  Holding off antibiotics -  wounds without any gross signs of infection.   Continue wound care    Patient has hx of acute on chronic  anemia. History of AVMs (gastric and duodenal) and Dieulafoy Lesions Status Post Hemostasis in 2023. History of perisplenic Varices in 2023. S/p EGD/colonoscopy/push enteroscopy on 7/22 showing AVMs and polyps. S/p 1U pRBC on 8/2, s/p course of venofer. On weekly IV transfusion/iron infusions per hematology.   Will continue to monitor h/h, currently Hgb 7.3 stable from yesterday  Monitor for bleeding, blood in stool, currently no signs of active bleeding  Keep active T&S every 72 hours (last one 8/14)  Macrocytic anemia noted, s/p 5 day course of b12 and receiving folate  Will recheck iron panel, f/u and consider another course of venofer as she receives weekly iron infusions per hematology  fu with GI outpatient    Patient has history of Chronic diastolic CHF and severe aortic stenosis   TTE Echo Complete w/o Contrast w/ Doppler (08.01.24 @ 11:03) >EF of 56 %. Mild asymmetric left ventricular hypertrophy. Grade II diastolic dysfunction). Severe aortic valve stenosis , Mild to moderate mitral valve regurgitation. Moderate aortic regurgitation.  Patient to follow up with Dr. Hernandez as an outpatient for TAVR  c/w metoprolol, added Midodrine 10 q8H  avoid fluid overload  OP fu with cardio Dr Hernandez     Patient has been hypotensive yesterday 8/13, BP down to 93/39 MAP 57. On midodrine 10mg tid. BPs have since improved, currently 123/49.    Patient is DNR/ DNI with NIV  Plan discuss with son Domenico as he's the HCP    F/u: Renal recommendations, last HD 8/15, labs, monitor PO intake, Goal of 2 BMs per day on lactulose Transfer from stepdown to floors    Hospital Course:  Patient is a 78-year-old female with past medical history of CKD, HTN, CCY, gout, severe AS being planned for tAVR, and history of severe anemia secondary to chronic upper GI bleeding due to  AV malformation gastric body dieulafoy lesion, weekly blood transfusions presenting to ER for a rupture pressure ulcer of the buttock area today.   Patient states that last night was on her toilet which cracked while she was sitting on it, and patient was unable to get off the toilet for 4 hours.  By the time EMS arrived skin had ulcerated, with a blister that ruptured predominantly to the left buttock area (appears to be like burn blister that ruptured) with linear abrasions to the bilateral upper thighs/buttocks.  Patient in so much pain that she cannot stand or walk, Of note, pt was recently admitted to the hospital between 7/13-7/23 for hematochezia, s/p 1 unit prbc. Denies fever, chills. N/V, abd pain.     ED Vitals:  T(C): 36.4 (30 Jul 2024 00:51), Max: 37.1 (29 Jul 2024 18:30)  T(F): 97.6 (30 Jul 2024 00:51), Max: 98.7 (29 Jul 2024 18:30)  HR: 65 (30 Jul 2024 00:51) (59 - 65)  BP: 146/69 (30 Jul 2024 00:51) (112/46 - 146/69)  RR: 18 (30 Jul 2024 00:51) (18 - 18)  SpO2: 99% (30 Jul 2024 00:51) (95% - 99%)    Labs significant for wbc 11k , hgb 9.1 , Cr 3.5 (baseline 2-3). Admitted for wound management and placement as pt can not care for her own ADLs. Hospital course complicated by EVAN and worsening encephalopathy, possibly due to uremia, s/p urgent HD, upgraded to SDU. Ammonia level 206. CK 2700, now improved. Hyperkalemia, improved on lokelma. EVAN improved, back to baseline creat 2.5. CTAP done and reported-  No evidence of an intraperitoneal or retroperitoneal hematoma; CT evidence of anemia as above. Moderate rectal stool burden with associated rectal wall thickening and adjacent edema. Findings may reflect developing stercoral proctitis. Patient started on lactulose, which resolved constipation. Goal 2 bowel movements per day.    Code stroke called 8/11 due to worsening mental status since 8/9. CT/CTA head negative for acute intracranial process. Repeat CT head 8/12 stable. [place] placed by vascular and started HD 8/11. Had another session HD 8/13.  Mental status significantly improved, aao x3, cleared for PO diet per s/s.    Ucx 8/11 with GNRs, but given improvement in mental status/ no leukocytosis or fevers, had been monitoring off antibiotics. Sensitivities grew meropenem sensitive  Enterbacter Cloacae. ID consulted given episodes of hypotension. No fevers. WBC normal, but baseline leukopenic.    AMS likely due to metabolic encephalopathy and has improved s/p three sessions of HD. Patient also now having regular bowel movements on lactulose, can consider weaning off lactulose with goal of 2 BMs per day.  Creatinine remains elevated at 2.5 from 2.2 yesterday (baseline 1.8).  Received HD 8/11, 8/13, 8/15.  S/p lokelma for Hyperkalemia, no dced per nephro  Nephro following, f/u recs  Consider removing Right femoral [place] once HD is no longer indicated, per nephro    Originally presented for bilateral buttocks and sacrum deep Tissue Injury.   Bedside debridement done by burn 8/13, no plans for further debridement per burn  Holding off antibiotics -  wounds without any gross signs of infection.   Continue wound care    Patient has hx of acute on chronic  anemia. History of AVMs (gastric and duodenal) and Dieulafoy Lesions Status Post Hemostasis in 2023. History of perisplenic Varices in 2023. S/p EGD/colonoscopy/push enteroscopy on 7/22 showing AVMs and polyps. S/p 1U pRBC on 8/2, s/p course of venofer. On weekly IV transfusion/iron infusions per hematology.   Will continue to monitor h/h, currently Hgb 7.3 stable from yesterday  Monitor for bleeding, blood in stool, currently no signs of active bleeding  Keep active T&S every 72 hours (last one 8/14)  Macrocytic anemia noted, s/p 5 day course of b12 and receiving folate  Will recheck iron panel, f/u and consider another course of venofer as she receives weekly iron infusions per hematology  fu with GI outpatient    Patient has history of Chronic diastolic CHF and severe aortic stenosis   TTE Echo Complete w/o Contrast w/ Doppler (08.01.24 @ 11:03) >EF of 56 %. Mild asymmetric left ventricular hypertrophy. Grade II diastolic dysfunction). Severe aortic valve stenosis , Mild to moderate mitral valve regurgitation. Moderate aortic regurgitation.  Patient to follow up with Dr. Hernandez as an outpatient for TAVR  c/w metoprolol, added Midodrine 10 q8H  avoid fluid overload  OP fu with cardio Dr Hernandez     Patient has been hypotensive yesterday 8/13, BP down to 93/39 MAP 57. On midodrine 10mg tid. BPs have since improved, currently 123/49.    Patient is DNR/ DNI with NIV  Plan discuss with son Domenico as he's the HCP    F/u: Renal recommendations, last HD 8/15, labs, monitor PO intake, Goal of 2 BMs per day on lactulose Transfer from stepdown to floors    Hospital Course:  Patient is a 78-year-old female with past medical history of CKD, HTN, CCY, gout, severe AS being planned for tAVR, and history of severe anemia secondary to chronic upper GI bleeding due to  AV malformation gastric body dieulafoy lesion, weekly blood transfusions presenting to ER for a rupture pressure ulcer of the buttock area today.   Patient states that last night was on her toilet which cracked while she was sitting on it, and patient was unable to get off the toilet for 4 hours.  By the time EMS arrived skin had ulcerated, with a blister that ruptured predominantly to the left buttock area (appears to be like burn blister that ruptured) with linear abrasions to the bilateral upper thighs/buttocks.  Patient in so much pain that she cannot stand or walk, Of note, pt was recently admitted to the hospital between 7/13-7/23 for hematochezia, s/p 1 unit prbc. Denies fever, chills. N/V, abd pain.     ED Vitals:  T(C): 36.4 (30 Jul 2024 00:51), Max: 37.1 (29 Jul 2024 18:30)  T(F): 97.6 (30 Jul 2024 00:51), Max: 98.7 (29 Jul 2024 18:30)  HR: 65 (30 Jul 2024 00:51) (59 - 65)  BP: 146/69 (30 Jul 2024 00:51) (112/46 - 146/69)  RR: 18 (30 Jul 2024 00:51) (18 - 18)  SpO2: 99% (30 Jul 2024 00:51) (95% - 99%)    Labs significant for wbc 11k , hgb 9.1 , Cr 3.5 (baseline 2-3). Admitted for wound management and placement as pt can not care for her own ADLs. Hospital course complicated by EVAN and worsening encephalopathy, possibly due to uremia, s/p urgent HD, upgraded to SDU. Ammonia level 206. CK 2700, now improved. Hyperkalemia, improved on lokelma. EVAN improved, back to baseline creat 2.5. CTAP done and reported-  No evidence of an intraperitoneal or retroperitoneal hematoma; CT evidence of anemia as above. Moderate rectal stool burden with associated rectal wall thickening and adjacent edema. Findings may reflect developing stercoral proctitis. Patient started on lactulose, which resolved constipation. Goal 2 bowel movements per day.    Code stroke called 8/11 due to worsening mental status since 8/9. CT/CTA head negative for acute intracranial process. Repeat CT head 8/12 stable. [place] placed by vascular and started HD 8/11. Had another session HD 8/13.  Mental status significantly improved, aao x3, cleared for PO diet per s/s.    Ucx 8/11 with GNRs, but given improvement in mental status/ no leukocytosis or fevers, had been monitoring off antibiotics. Sensitivities grew meropenem sensitive  Enterbacter Cloacae. ID consulted given episodes of hypotension. No fevers. WBC normal, but baseline leukopenic.    AMS likely due to metabolic encephalopathy and has improved s/p three sessions of HD. Patient also now having regular bowel movements on lactulose, can consider weaning off lactulose with goal of 2 BMs per day.  Creatinine remains elevated at 2.5 from 2.2 yesterday (baseline 1.8).  Received HD 8/11, 8/13, 8/15.  S/p lokelma for Hyperkalemia, no dced per nephro  Nephro following, f/u recs  Consider removing Right femoral [place] once HD is no longer indicated, per nephro    Originally presented for bilateral buttocks and sacrum deep Tissue Injury.   Bedside debridement done by burn 8/13, no plans for further debridement per burn  Holding off antibiotics -  wounds without any gross signs of infection.   Continue wound care    Patient has hx of acute on chronic  anemia. History of AVMs (gastric and duodenal) and Dieulafoy Lesions Status Post Hemostasis in 2023. History of perisplenic Varices in 2023. S/p EGD/colonoscopy/push enteroscopy on 7/22 showing AVMs and polyps. S/p 1U pRBC on 8/2, s/p course of venofer. On weekly IV transfusion/iron infusions per hematology.   Will continue to monitor h/h, currently Hgb 7.3 stable from yesterday  Monitor for bleeding, blood in stool, currently no signs of active bleeding  Keep active T&S every 72 hours (last one 8/14)  Macrocytic anemia noted, s/p 5 day course of b12 and receiving folate  Will recheck iron panel, f/u and consider another course of venofer as she receives weekly iron infusions per hematology  fu with GI outpatient    Patient has history of Chronic diastolic CHF and severe aortic stenosis   TTE Echo Complete w/o Contrast w/ Doppler (08.01.24 @ 11:03) >EF of 56 %. Mild asymmetric left ventricular hypertrophy. Grade II diastolic dysfunction). Severe aortic valve stenosis , Mild to moderate mitral valve regurgitation. Moderate aortic regurgitation.  Patient to follow up with Dr. Hernandez as an outpatient for TAVR  c/w metoprolol, added Midodrine 10 q8H  avoid fluid overload  OP fu with cardio Dr Hernandez     Patient has been hypotensive yesterday 8/13, BP down to 93/39 MAP 57. On midodrine 10mg tid. BPs have since improved, currently 123/49.    Patient is DNR/ DNI with NIV  Plan discuss with son Domenico as he's the HCP    F/u: Renal recommendations, last HD 8/15, labs, monitor PO intake, Goal of 2 BMs per day on lactulose, F/u ID recs regarding UCx

## 2024-08-15 NOTE — SWALLOW BEDSIDE ASSESSMENT ADULT - NS SPL SWALLOW CLINIC TRIAL FT
+ toleration observed without overt symptoms of penetration/aspiration

## 2024-08-15 NOTE — PROGRESS NOTE ADULT - ASSESSMENT
IMPRESSION:    -AMS sp stroke code/ head CT noted  -Acute on CKD sp HD  -severe AS plan for TAVR   -B/L buttock wound SP debridement      PLAN:      CNS: avoid sedation, neuro on board, f/u EEG noted, CT head noted     HEENT: Oral care    PULMONARY:  HOB @ 45 degrees. Aspiration precautions, wean oxygen, keep SaO2 92 TO 96%    CARDIOVASCULAR: avoid volume overloa    GI: GI prophylaxis.  Feeding. Ensure BM, on lactulose    RENAL:  Follow up lytes. Correct as needed, renal fup, dc lokelma    INFECTIOUS DISEASE: Follow up cultures, procalcitonin, f/u burn     HEMATOLOGICAL:  DVT prophylaxis    ENDOCRINE:  Follow up FS.  Insulin protocol if needed.    MUSCULOSKELETAL: ambulate as tolerated    poor prognosis  floor    GOC

## 2024-08-15 NOTE — PROGRESS NOTE ADULT - REASON FOR ADMISSION
pressure ulcer
sacral wound
AMS
AMS
pressure ulcer
pressure ulcer rupture
sacral wound
Pressure ulcer

## 2024-08-15 NOTE — PROGRESS NOTE ADULT - ASSESSMENT
78-year-old female with past medical history of   CKD, HTN, CCY, gout,  severe AS being planned for TAVR, and history of severe anemia secondary to chronic upper GI bleeding due to  AV malformation gastric body dieulafoy lesion, weekly blood transfusions presenting to ER for a rupture pressure ulcer of the buttock area.  #EVAN on CKD4/ anemia   #AMS  -hd today / will assess dialy needs / if cr stable d/c udall and hold hd   - start bumex 1 q 12   - non oliguric   - no hydro on renal/bladder sono   -  last phos noted; monitor  - continue  midodrine 10mg q8h    - cont WOODY wkly  - h/h noted / on WOODY on the floor if remains on hd will change to after HD   - neuro f/u   - overall prognosis poor   will follow

## 2024-08-15 NOTE — SWALLOW BEDSIDE ASSESSMENT ADULT - SLP GENERAL OBSERVATIONS
Received awake, alert, +room air, AOx3
Received awake, alert, +room air, AOx3
pt awake however not consistently responding to questions or following commands. pt provided first name only. Some moaning noted unable to specify if it was due to pain.

## 2024-08-15 NOTE — SWALLOW BEDSIDE ASSESSMENT ADULT - SWALLOW EVAL: RECOMMENDED FEEDING/EATING TECHNIQUES
crush medication (when feasible)/small sips/bites

## 2024-08-15 NOTE — PROGRESS NOTE ADULT - SUBJECTIVE AND OBJECTIVE BOX
Over Night Events: events noted, burn. renal reviewed, afebrile    PHYSICAL EXAM    ICU Vital Signs Last 24 Hrs  T(C): 37 (15 Aug 2024 08:15), Max: 37.5 (15 Aug 2024 04:00)  T(F): 98.6 (15 Aug 2024 08:15), Max: 99.5 (15 Aug 2024 04:00)  HR: 63 (15 Aug 2024 08:15) (60 - 66)  BP: 97/41 (15 Aug 2024 08:15) (90/42 - 121/52)  BP(mean): 59 (15 Aug 2024 08:15) (58 - 72)  RR: 20 (15 Aug 2024 08:15) (18 - 20)  SpO2: 98% (15 Aug 2024 08:15) (96% - 100%)    O2 Parameters below as of 15 Aug 2024 08:15  Patient On (Oxygen Delivery Method): room air            General: ill looking  Lungs: dec bs both bases  Cardiovascular: KATHYA 2.6   Abdomen: Soft, Positive BS  Extremities: No clubbing   Neurological: Non focal       08-14-24 @ 07:01  -  08-15-24 @ 07:00  --------------------------------------------------------  IN:  Total IN: 0 mL    OUT:    Indwelling Catheter - Urethral (mL): 400 mL  Total OUT: 400 mL    Total NET: -400 mL          LABS:                          7.3    7.09  )-----------( 144      ( 15 Aug 2024 05:53 )             24.0                                               08-15    141  |  105  |  61<HH>  ----------------------------<  105<H>  3.9   |  25  |  2.5<H>    Ca    7.5<L>      15 Aug 2024 05:53  Phos  3.8     08-15  Mg     2.4     08-15    TPro  4.7<L>  /  Alb  2.6<L>  /  TBili  0.6  /  DBili  x   /  AST  32  /  ALT  22  /  AlkPhos  89  08-15                                             Urinalysis Basic - ( 15 Aug 2024 05:53 )    Color: x / Appearance: x / SG: x / pH: x  Gluc: 105 mg/dL / Ketone: x  / Bili: x / Urobili: x   Blood: x / Protein: x / Nitrite: x   Leuk Esterase: x / RBC: x / WBC x   Sq Epi: x / Non Sq Epi: x / Bacteria: x                                                  LIVER FUNCTIONS - ( 15 Aug 2024 05:53 )  Alb: 2.6 g/dL / Pro: 4.7 g/dL / ALK PHOS: 89 U/L / ALT: 22 U/L / AST: 32 U/L / GGT: x                                                                                                                                       MEDICATIONS  (STANDING):  acetaminophen     Tablet .. 1000 milliGRAM(s) Oral every 8 hours  chlorhexidine 2% Cloths 1 Application(s) Topical daily  epoetin raquel (EPOGEN) Injectable 50133 Unit(s) SubCutaneous every 7 days  folic acid 1 milliGRAM(s) Oral daily  heparin   Injectable 5000 Unit(s) SubCutaneous every 12 hours  lactulose Syrup 20 Gram(s) Oral every 6 hours  lidocaine 1%/epinephrine 1:100,000 Inj 2 Vial(s) Local Injection once  lidocaine 1%/epinephrine 1:100,000 Inj 3 Vial(s) Local Injection once  metoprolol tartrate 12.5 milliGRAM(s) Oral every 12 hours  midodrine. 10 milliGRAM(s) Oral three times a day  pantoprazole    Tablet 40 milliGRAM(s) Oral before breakfast  polyethylene glycol 3350 17 Gram(s) Oral daily  senna 2 Tablet(s) Oral at bedtime  sodium zirconium cyclosilicate 10 Gram(s) Oral daily  sucralfate 1 Gram(s) Oral every 12 hours

## 2024-08-15 NOTE — SWALLOW BEDSIDE ASSESSMENT ADULT - SWALLOW EVAL: RECOMMENDED DIET
puree with thin liquids
soft + bite sized consistency and thin liquids
soft + bite sized consistency and thin liquids

## 2024-08-16 ENCOUNTER — APPOINTMENT (OUTPATIENT)
Age: 79
End: 2024-08-16

## 2024-08-16 ENCOUNTER — NON-APPOINTMENT (OUTPATIENT)
Age: 79
End: 2024-08-16

## 2024-08-16 LAB
ALBUMIN SERPL ELPH-MCNC: 2.4 G/DL — LOW (ref 3.5–5.2)
ALP SERPL-CCNC: 81 U/L — SIGNIFICANT CHANGE UP (ref 30–115)
ALT FLD-CCNC: 17 U/L — SIGNIFICANT CHANGE UP (ref 0–41)
ANION GAP SERPL CALC-SCNC: 11 MMOL/L — SIGNIFICANT CHANGE UP (ref 7–14)
AST SERPL-CCNC: 28 U/L — SIGNIFICANT CHANGE UP (ref 0–41)
BASOPHILS # BLD AUTO: 0.01 K/UL — SIGNIFICANT CHANGE UP (ref 0–0.2)
BASOPHILS NFR BLD AUTO: 0.2 % — SIGNIFICANT CHANGE UP (ref 0–1)
BILIRUB SERPL-MCNC: 0.6 MG/DL — SIGNIFICANT CHANGE UP (ref 0.2–1.2)
BUN SERPL-MCNC: 40 MG/DL — HIGH (ref 10–20)
CALCIUM SERPL-MCNC: 7.4 MG/DL — LOW (ref 8.4–10.5)
CHLORIDE SERPL-SCNC: 103 MMOL/L — SIGNIFICANT CHANGE UP (ref 98–110)
CO2 SERPL-SCNC: 24 MMOL/L — SIGNIFICANT CHANGE UP (ref 17–32)
CREAT SERPL-MCNC: 2.2 MG/DL — HIGH (ref 0.7–1.5)
CULTURE RESULTS: SIGNIFICANT CHANGE UP
EGFR: 22 ML/MIN/1.73M2 — LOW
EOSINOPHIL # BLD AUTO: 0.24 K/UL — SIGNIFICANT CHANGE UP (ref 0–0.7)
EOSINOPHIL NFR BLD AUTO: 3.8 % — SIGNIFICANT CHANGE UP (ref 0–8)
FERRITIN SERPL-MCNC: 675 NG/ML — HIGH (ref 13–330)
GLUCOSE SERPL-MCNC: 115 MG/DL — HIGH (ref 70–99)
HCT VFR BLD CALC: 22.2 % — LOW (ref 37–47)
HCT VFR BLD CALC: 26 % — LOW (ref 37–47)
HGB BLD-MCNC: 6.7 G/DL — CRITICAL LOW (ref 12–16)
HGB BLD-MCNC: 8 G/DL — LOW (ref 12–16)
IMM GRANULOCYTES NFR BLD AUTO: 0.3 % — SIGNIFICANT CHANGE UP (ref 0.1–0.3)
LYMPHOCYTES # BLD AUTO: 0.34 K/UL — LOW (ref 1.2–3.4)
LYMPHOCYTES # BLD AUTO: 5.4 % — LOW (ref 20.5–51.1)
MAGNESIUM SERPL-MCNC: 2.1 MG/DL — SIGNIFICANT CHANGE UP (ref 1.8–2.4)
MCHC RBC-ENTMCNC: 30.2 G/DL — LOW (ref 32–37)
MCHC RBC-ENTMCNC: 30.8 G/DL — LOW (ref 32–37)
MCHC RBC-ENTMCNC: 31 PG — SIGNIFICANT CHANGE UP (ref 27–31)
MCHC RBC-ENTMCNC: 31 PG — SIGNIFICANT CHANGE UP (ref 27–31)
MCV RBC AUTO: 100.8 FL — HIGH (ref 81–99)
MCV RBC AUTO: 102.8 FL — HIGH (ref 81–99)
MONOCYTES # BLD AUTO: 0.45 K/UL — SIGNIFICANT CHANGE UP (ref 0.1–0.6)
MONOCYTES NFR BLD AUTO: 7.2 % — SIGNIFICANT CHANGE UP (ref 1.7–9.3)
NEUTROPHILS # BLD AUTO: 5.22 K/UL — SIGNIFICANT CHANGE UP (ref 1.4–6.5)
NEUTROPHILS NFR BLD AUTO: 83.1 % — HIGH (ref 42.2–75.2)
NRBC # BLD: 0 /100 WBCS — SIGNIFICANT CHANGE UP (ref 0–0)
NRBC # BLD: 0 /100 WBCS — SIGNIFICANT CHANGE UP (ref 0–0)
PLATELET # BLD AUTO: 107 K/UL — LOW (ref 130–400)
PLATELET # BLD AUTO: 96 K/UL — LOW (ref 130–400)
PMV BLD: 10.2 FL — SIGNIFICANT CHANGE UP (ref 7.4–10.4)
PMV BLD: 10.3 FL — SIGNIFICANT CHANGE UP (ref 7.4–10.4)
POTASSIUM SERPL-MCNC: 3.7 MMOL/L — SIGNIFICANT CHANGE UP (ref 3.5–5)
POTASSIUM SERPL-SCNC: 3.7 MMOL/L — SIGNIFICANT CHANGE UP (ref 3.5–5)
PROT SERPL-MCNC: 4.3 G/DL — LOW (ref 6–8)
RBC # BLD: 2.16 M/UL — LOW (ref 4.2–5.4)
RBC # BLD: 2.58 M/UL — LOW (ref 4.2–5.4)
RBC # FLD: 20.6 % — HIGH (ref 11.5–14.5)
RBC # FLD: 21.1 % — HIGH (ref 11.5–14.5)
SODIUM SERPL-SCNC: 138 MMOL/L — SIGNIFICANT CHANGE UP (ref 135–146)
SPECIMEN SOURCE: SIGNIFICANT CHANGE UP
WBC # BLD: 6.06 K/UL — SIGNIFICANT CHANGE UP (ref 4.8–10.8)
WBC # BLD: 6.28 K/UL — SIGNIFICANT CHANGE UP (ref 4.8–10.8)
WBC # FLD AUTO: 6.06 K/UL — SIGNIFICANT CHANGE UP (ref 4.8–10.8)
WBC # FLD AUTO: 6.28 K/UL — SIGNIFICANT CHANGE UP (ref 4.8–10.8)

## 2024-08-16 PROCEDURE — 99233 SBSQ HOSP IP/OBS HIGH 50: CPT

## 2024-08-16 RX ORDER — MEROPENEM 500 MG/10ML
1000 INJECTION, POWDER, FOR SOLUTION INTRAVENOUS EVERY 12 HOURS
Refills: 0 | Status: DISCONTINUED | OUTPATIENT
Start: 2024-08-16 | End: 2024-08-19

## 2024-08-16 RX ORDER — TRAMADOL HYDROCHLORIDE 200 MG/1
25 TABLET, EXTENDED RELEASE ORAL EVERY 6 HOURS
Refills: 0 | Status: DISCONTINUED | OUTPATIENT
Start: 2024-08-16 | End: 2024-08-22

## 2024-08-16 RX ORDER — IRON SUCROSE 20 MG/ML
200 INJECTION, SOLUTION INTRAVENOUS ONCE
Refills: 0 | Status: COMPLETED | OUTPATIENT
Start: 2024-08-16 | End: 2024-08-16

## 2024-08-16 RX ORDER — ACETAMINOPHEN 325 MG/1
1000 TABLET ORAL ONCE
Refills: 0 | Status: COMPLETED | OUTPATIENT
Start: 2024-08-16 | End: 2024-08-16

## 2024-08-16 RX ADMIN — TRAMADOL HYDROCHLORIDE 25 MILLIGRAM(S): 200 TABLET, EXTENDED RELEASE ORAL at 12:46

## 2024-08-16 RX ADMIN — Medication 20 GRAM(S): at 11:53

## 2024-08-16 RX ADMIN — TRAMADOL HYDROCHLORIDE 25 MILLIGRAM(S): 200 TABLET, EXTENDED RELEASE ORAL at 12:42

## 2024-08-16 RX ADMIN — Medication 5000 UNIT(S): at 17:37

## 2024-08-16 RX ADMIN — ACETAMINOPHEN 1000 MILLIGRAM(S): 325 TABLET ORAL at 09:31

## 2024-08-16 RX ADMIN — CHLORHEXIDINE GLUCONATE 1 APPLICATION(S): 40 SOLUTION TOPICAL at 11:51

## 2024-08-16 RX ADMIN — SUCRALFATE 1 GRAM(S): 1 SUSPENSION ORAL at 17:37

## 2024-08-16 RX ADMIN — ACETAMINOPHEN 1000 MILLIGRAM(S): 325 TABLET ORAL at 13:22

## 2024-08-16 RX ADMIN — Medication 1 MILLIGRAM(S): at 11:53

## 2024-08-16 RX ADMIN — ACETAMINOPHEN 1000 MILLIGRAM(S): 325 TABLET ORAL at 06:03

## 2024-08-16 RX ADMIN — Medication 20 GRAM(S): at 06:01

## 2024-08-16 RX ADMIN — Medication 20 GRAM(S): at 17:37

## 2024-08-16 RX ADMIN — BUMETANIDE 1 MILLIGRAM(S): 2 TABLET ORAL at 17:37

## 2024-08-16 RX ADMIN — METOPROLOL TARTRATE 12.5 MILLIGRAM(S): 100 TABLET ORAL at 06:01

## 2024-08-16 RX ADMIN — MEROPENEM 100 MILLIGRAM(S): 500 INJECTION, POWDER, FOR SOLUTION INTRAVENOUS at 22:13

## 2024-08-16 RX ADMIN — Medication 5000 UNIT(S): at 06:01

## 2024-08-16 RX ADMIN — ACETAMINOPHEN 1000 MILLIGRAM(S): 325 TABLET ORAL at 23:13

## 2024-08-16 RX ADMIN — MIDODRINE HYDROCHLORIDE 10 MILLIGRAM(S): 5 TABLET ORAL at 17:40

## 2024-08-16 RX ADMIN — METOPROLOL TARTRATE 12.5 MILLIGRAM(S): 100 TABLET ORAL at 17:36

## 2024-08-16 RX ADMIN — MIDODRINE HYDROCHLORIDE 10 MILLIGRAM(S): 5 TABLET ORAL at 06:02

## 2024-08-16 RX ADMIN — ACETAMINOPHEN 1000 MILLIGRAM(S): 325 TABLET ORAL at 22:13

## 2024-08-16 RX ADMIN — ACETAMINOPHEN 400 MILLIGRAM(S): 325 TABLET ORAL at 09:24

## 2024-08-16 RX ADMIN — IRON SUCROSE 110 MILLIGRAM(S): 20 INJECTION, SOLUTION INTRAVENOUS at 11:54

## 2024-08-16 RX ADMIN — Medication 40 MILLIGRAM(S): at 06:01

## 2024-08-16 RX ADMIN — SUCRALFATE 1 GRAM(S): 1 SUSPENSION ORAL at 06:02

## 2024-08-16 RX ADMIN — MIDODRINE HYDROCHLORIDE 10 MILLIGRAM(S): 5 TABLET ORAL at 11:54

## 2024-08-16 RX ADMIN — MEROPENEM 100 MILLIGRAM(S): 500 INJECTION, POWDER, FOR SOLUTION INTRAVENOUS at 14:47

## 2024-08-16 RX ADMIN — POLYETHYLENE GLYCOL 3350 17 GRAM(S): 17 POWDER, FOR SOLUTION ORAL at 11:52

## 2024-08-16 RX ADMIN — BUMETANIDE 1 MILLIGRAM(S): 2 TABLET ORAL at 06:02

## 2024-08-16 RX ADMIN — ACETAMINOPHEN 1000 MILLIGRAM(S): 325 TABLET ORAL at 06:37

## 2024-08-16 NOTE — PROGRESS NOTE ADULT - ASSESSMENT
78-year-old female with past medical history of   CKD, HTN, CCY, gout,  severe AS being planned for tAVR, and history of severe anemia secondary to chronic upper GI bleeding due to  AV malformation gastric body dieulafoy lesion, weekly blood transfusions presenting to ER for a rupture pressure ulcer of the buttock area. Hospital course complicated by worsening encephalopathy s/p urgent HD, upgraded to SDU      A/P   # Worsening metabolic encephalopathy /Acute kidney inury on CKD stage 4/ rhabdomyolysis / Hyperammonemia   - mental status improved now, no indications for  brain MRI  - pt started on HD ( last HD on 8/15),  nephrology is following, keep Udol for now , monitor BUN/cr if stable d/c Udol   - CTAP done and reported-  No evidence of an intraperitoneal or retroperitoneal hematoma; CT evidence of anemia as above. Moderate rectal stool burden with associated rectal wall thickening and adjacent edema. Findings may reflect developing stercoral proctitis.  - patient started on Lactulose--> now having BM, goal 2-3 BM daily   - c/w Lokelma   - renal diet  - c/w Bumex 1 mg Q 12 hours ( recommended by nephrology)   - supportive care     # Bilateral buttocks and sacrum deep Tissue Injury/  bacteuria with ESBL Enterobacter with culver catheter  - ID following, started on Meropenem today   - c/w wound care per burn, s/p debridement on 8/14  - off load pressure points, change position Q 2 hours   - maintain fluid balance     # Acute on chronic  anemia/ History of AVMs (gastric and duodenal) and Dieulafoy Lesions Status Post Hemostasis in 2023/ History of perisplenic Varices in 2023   - s/p EGD/colonoscopy/push enteroscopy on 7/22 showing AVMs and polyps  - s/p 1U pRBC on 8/2, s/p course of Venofer  - on weekly IV transfusion/iron infusions per hematology  - monitor h/h, keep active T&S  - keep Hb above 7.0 , transfuse one unit of PRBC today, pt received Venofer 200 mg x one dose today, will hold off for now ( pt was started on IV Abx)     # Chronic diastolic CHF/ Severe aortic stenosis   -  TTE Echo Complete w/o Contrast w/ Doppler (08.01.24 @ 11:03) >EF of 56 %. Mild asymmetric left ventricular hypertrophy. Grade II diastolic dysfunction). Severe aortic valve stenosis , Mild to moderate mitral valve regurgitation. Moderate aortic regurgitation.  - Patient to follow up with Dr. Hernandez as an outpatient for TAVR  - c/w metoprolol, c/w  Midodrine 10 q8H  - HD as scheduled       GOC : DNR/ DNI with NIV      Overall prognosis is guarded    #Progress Note Handoff  Pending (specify): transfuse one unit of PRBC, give Venofer 200 mg IV x one dose, start Meropenem,  monitor H/H, keep Hb above 7.0, supportive care, monitor BUN/cr and urine output, if Cr is stable, d/c Udol ( confirm with nephrology),  PT/rehab evaluation   # DIspo: TBD

## 2024-08-16 NOTE — CONSULT NOTE ADULT - TIME BILLING
-I reviewed prior external available medical records  -I recommended ordering the unique tests to make the diagnosis of an infection  -I reviewed the completed testing   -My assessment required an independent historian and is independent of the teaching service  -I independently interpreted the most recent imaging ( CXR )  -I discussed my recommendations with the primary team housestaff/Attending  -I assisted with prescription drug management ( discharge antibiotics )
I have personally seen and examined this patient.    I have reviewed all pertinent clinical information and reviewed all relevant imaging and diagnostic studies personally.   I counseled the patient about diagnostic testing and treatment plan. All questions were answered.   I discussed recommendations with the primary team.
above

## 2024-08-16 NOTE — CONSULT NOTE ADULT - SUBJECTIVE AND OBJECTIVE BOX
LATRICIA ARREAGA  78y, Female  Allergy: aspirin (Rash; Other)  penicillins (Rash; Urticaria; Hives; Blisters)  latex (Unknown)  EKG leads (Hives)      All historical available data reviewed.    HPI:  HPI : Patient is a 78-year-old female with past medical history of   CKD, HTN, CCY, gout,  severe AS being planned for tAVR, and history of severe anemia secondary to chronic upper GI bleeding due to  AV malformation gastric body dieulafoy lesion, weekly blood transfusions presenting to ER for a rupture pressure ulcer of the buttock area today.   Patient states that last night was on her toilet which cracked while she was sitting on it, and patient was unable to get off the toilet for 4 hours.  By the time EMS arrived skin had ulcerated, with a blister that ruptured predominantly to the left buttock area (appears to be like burn blister that ruptured) with linear abrasions to the bilateral upper thighs/buttocks.  Patient in so much pain that she cannot stand or walk, Of note, pt was recently admitted to the hospital between - for hematochezia, s/p 1 unit prbc. Denies fever, chills. N/V, abd pain.     Vital Signs Last 24 Hrs  T(C): 36.4 (2024 00:51), Max: 37.1 (2024 18:30)  T(F): 97.6 (2024 00:51), Max: 98.7 (2024 18:30)  HR: 65 (2024 00:51) (59 - 65)  BP: 146/69 (2024 00:51) (112/46 - 146/69)  BP(mean): --  RR: 18 (2024 00:51) (18 - 18)  SpO2: 99% (2024 00:51) (95% - 99%)    Parameters below as of 2024 00:51  Patient On (Oxygen Delivery Method): room air    Labs significant for wbc 11k , hgb 9.1 , Cr 3.5 (baseline 2-3)   s/p  1L NS in ED   Admitted for wound management and placement as pt can not care for her own ADLs. (2024 03:46)    FAMILY HISTORY:  FH: kidney disease (Father)    FH: breast cancer (Mother)    FH: multiple myeloma (Mother)      PAST MEDICAL & SURGICAL HISTORY:  HTN (hypertension)      Heart murmur      Eczema      Anemia  iron infusions and PRBC transfusions      Aortic stenosis      Chronic kidney disease (CKD)      2019 novel coronavirus disease (COVID-19)  2020- REHAB admission      Gastric AVM      H/O appendicitis      S/P cholecystectomy      History of esophagogastroduodenoscopy (EGD)      H/O colonoscopy      H/O  section      History of appendectomy      Port-A-Cath in place  right CW            VITALS:  T(F): 98.6, Max: 98.6 (24 @ 07:30)  HR: 65  BP: 106/55  RR: 18Vital Signs Last 24 Hrs  T(C): 37 (16 Aug 2024 07:30), Max: 37 (16 Aug 2024 07:30)  T(F): 98.6 (16 Aug 2024 07:30), Max: 98.6 (16 Aug 2024 07:30)  HR: 65 (16 Aug 2024 07:30) (65 - 76)  BP: 106/55 (16 Aug 2024 07:30) (94/46 - 120/55)  BP(mean): 67 (15 Aug 2024 20:00) (67 - 67)  RR: 18 (16 Aug 2024 01:16) (18 - 20)  SpO2: 95% (16 Aug 2024 01:16) (95% - 100%)    Parameters below as of 16 Aug 2024 01:16  Patient On (Oxygen Delivery Method): room air        TESTS & MEASUREMENTS:                        6.7    6.28  )-----------( 107      ( 16 Aug 2024 09:28 )             22.2     08-16    138  |  103  |  40<H>  ----------------------------<  115<H>  3.7   |  24  |  2.2<H>    Ca    7.4<L>      16 Aug 2024 09:28  Phos  3.8     08-15  Mg     2.1     08-16    TPro  4.3<L>  /  Alb  2.4<L>  /  TBili  0.6  /  DBili  x   /  AST  28  /  ALT  17  /  AlkPhos  81  08-16    LIVER FUNCTIONS - ( 16 Aug 2024 09:28 )  Alb: 2.4 g/dL / Pro: 4.3 g/dL / ALK PHOS: 81 U/L / ALT: 17 U/L / AST: 28 U/L / GGT: x             Culture - Urine (collected 24 @ 12:55)  Source: Clean Catch Clean Catch (Midstream)  Final Report (08-15-24 @ 10:11):    >100,000 CFU/ml Enterobacter cloacae complex  Organism: Enterobacter cloacae complex (08-15-24 @ 10:11)  Organism: Enterobacter cloacae complex (08-15-24 @ 10:11)      Method Type: BAUTISTA      -  Amoxicillin/Clavulanic Acid: R >16/8      -  Ampicillin: R >16 These ampicillin results predict results for amoxicillin      -  Ampicillin/Sulbactam: R >16/8      -  Aztreonam: R >16      -  Cefazolin: R >16      -  Cefepime: R 16      -  Cefoxitin: R >16      -  Ceftriaxone: R >32 Enterobacter cloacae, Klebsiella aerogenes, and Citrobacter freundii may develop resistance during prolonged therapy.      -  Ciprofloxacin: S <=0.25      -  Ertapenem: S <=0.5      -  Gentamicin: S <=2      -  Imipenem: S <=1      -  Levofloxacin: S <=0.5      -  Meropenem: S <=1      -  Nitrofurantoin: S <=32 Should not be used to treat pyelonephritis      -  Piperacillin/Tazobactam: R >64 Treatment with Pipercillin/Tazobactam is not recommended in severe infections casued by Klebsiella aerogenes, Enterobacter cloacae complex, and Citrobacter freundii complex.      -  Tobramycin: S <=2      -  Trimethoprim/Sulfamethoxazole: S <=0.5/9.5    Culture - Blood (collected 24 @ 07:01)  Source: .Blood Blood-Peripheral  Preliminary Report (08-15-24 @ 16:00):    No growth at 4 days      Urinalysis Basic - ( 16 Aug 2024 09:28 )    Color: x / Appearance: x / SG: x / pH: x  Gluc: 115 mg/dL / Ketone: x  / Bili: x / Urobili: x   Blood: x / Protein: x / Nitrite: x   Leuk Esterase: x / RBC: x / WBC x   Sq Epi: x / Non Sq Epi: x / Bacteria: x          RADIOLOGY & ADDITIONAL TESTS:  Personal review of radiological diagnostics performed  Echo and EKG results noted when applicable.     MEDICATIONS:  acetaminophen     Tablet .. 1000 milliGRAM(s) Oral every 8 hours  buMETAnide 1 milliGRAM(s) Oral every 12 hours  chlorhexidine 2% Cloths 1 Application(s) Topical daily  epoetin raquel (EPOGEN) Injectable 12964 Unit(s) SubCutaneous every 7 days  folic acid 1 milliGRAM(s) Oral daily  heparin   Injectable 5000 Unit(s) SubCutaneous every 12 hours  lactulose Syrup 20 Gram(s) Oral every 6 hours  lidocaine 1%/epinephrine 1:100,000 Inj 2 Vial(s) Local Injection once  lidocaine 1%/epinephrine 1:100,000 Inj 3 Vial(s) Local Injection once  metoprolol tartrate 12.5 milliGRAM(s) Oral every 12 hours  midodrine. 10 milliGRAM(s) Oral three times a day  pantoprazole    Tablet 40 milliGRAM(s) Oral before breakfast  polyethylene glycol 3350 17 Gram(s) Oral daily  senna 2 Tablet(s) Oral at bedtime  sucralfate 1 Gram(s) Oral every 12 hours  traMADol 25 milliGRAM(s) Oral every 6 hours PRN      ANTIBIOTICS:

## 2024-08-16 NOTE — PROGRESS NOTE ADULT - SUBJECTIVE AND OBJECTIVE BOX
Nephrology Progress Note    LATRICIA ARREAGA  MRN-[id number]  78y  Female    S:  Patient is seen and examined, events over the last 24h noted.    O:  Allergies:  aspirin (Rash; Other)  penicillins (Rash; Urticaria; Hives; Blisters)  latex (Unknown)  EKG leads (Hives)    Hospital Medications:   MEDICATIONS  (STANDING):  acetaminophen     Tablet .. 1000 milliGRAM(s) Oral every 8 hours  buMETAnide 1 milliGRAM(s) Oral every 12 hours  chlorhexidine 2% Cloths 1 Application(s) Topical daily  epoetin raquel (EPOGEN) Injectable 90044 Unit(s) SubCutaneous every 7 days  folic acid 1 milliGRAM(s) Oral daily  heparin   Injectable 5000 Unit(s) SubCutaneous every 12 hours  lactulose Syrup 20 Gram(s) Oral every 6 hours  lidocaine 1%/epinephrine 1:100,000 Inj 2 Vial(s) Local Injection once  lidocaine 1%/epinephrine 1:100,000 Inj 3 Vial(s) Local Injection once  metoprolol tartrate 12.5 milliGRAM(s) Oral every 12 hours  midodrine. 10 milliGRAM(s) Oral three times a day  pantoprazole    Tablet 40 milliGRAM(s) Oral before breakfast  polyethylene glycol 3350 17 Gram(s) Oral daily  senna 2 Tablet(s) Oral at bedtime  sucralfate 1 Gram(s) Oral every 12 hours    MEDICATIONS  (PRN):  traMADol 25 milliGRAM(s) Oral every 6 hours PRN Severe Pain (7 - 10)    Home Medications:  furosemide 40 mg oral tablet: 1 tab(s) orally once a day (30 Jul 2024 04:41)  metoprolol tartrate 25 mg oral tablet: 1 tab(s) orally once a day (30 Jul 2024 04:41)      VITALS:  Daily     Daily   T(F): 98.6 (08-16-24 @ 07:30), Max: 98.6 (08-16-24 @ 07:30)  HR: 65 (08-16-24 @ 07:30)  BP: 106/55 (08-16-24 @ 07:30)  RR: 18 (08-16-24 @ 01:16)  SpO2: 95% (08-16-24 @ 01:16)  Wt(kg): --  I&O's Detail    15 Aug 2024 07:01  -  16 Aug 2024 07:00  --------------------------------------------------------  IN:  Total IN: 0 mL    OUT:    Indwelling Catheter - Urethral (mL): 700 mL    Oral Fluid: 0 mL    Other (mL): 500 mL  Total OUT: 1200 mL    Total NET: -1200 mL        I&O's Summary    15 Aug 2024 07:01  -  16 Aug 2024 07:00  --------------------------------------------------------  IN: 0 mL / OUT: 1200 mL / NET: -1200 mL          PHYSICAL EXAM:  Gen: NAD  Chest: b/l breath sounds  Abd: soft  Extremities: no edema  Vascular access:       LABS:      08-15    141  |  105  |  61<HH>  ----------------------------<  105<H>  3.9   |  25  |  2.5<H>    Ca    7.5<L>      15 Aug 2024 05:53  Phos  3.8     08-15  Mg     2.4     08-15    TPro  4.7<L>  /  Alb  2.6<L>  /  TBili  0.6  /  DBili      /  AST  32  /  ALT  22  /  AlkPhos  89  08-15    Phosphorus: 3.8 mg/dL (08-15-24 @ 05:53)  Phosphorus: 6.0 mg/dL (08-12-24 @ 06:30)                        7.3    7.09  )-----------( 144      ( 15 Aug 2024 05:53 )             24.0     Mean Cell Volume: 101.7 fL (08-15-24 @ 05:53)    Ferritin: 675 ng/mL (08.15.24 @ 10:36)  % Saturation, Iron: 23 % (08-15-24 @ 10:36)    Creatinine trend:  Creatinine: 2.5 mg/dL (08-15-24 @ 05:53)  Creatinine: 2.2 mg/dL (08-14-24 @ 12:02)  Creatinine: 2.1 mg/dL (08-14-24 @ 07:26)  Creatinine: 2.5 mg/dL (08-12-24 @ 06:30)  Creatinine: 2.5 mg/dL (08-12-24 @ 03:55) Nephrology Progress Note    LATRICIA ARREAGA  [id number]  78y  Female    S:  Patient is seen and examined, events over the last 24h noted.    O:  Allergies:  aspirin (Rash; Other)  penicillins (Rash; Urticaria; Hives; Blisters)  latex (Unknown)  EKG leads (Hives)    Hospital Medications:   MEDICATIONS  (STANDING):  acetaminophen     Tablet .. 1000 milliGRAM(s) Oral every 8 hours  buMETAnide 1 milliGRAM(s) Oral every 12 hours  chlorhexidine 2% Cloths 1 Application(s) Topical daily  epoetin raquel (EPOGEN) Injectable 35799 Unit(s) SubCutaneous every 7 days  folic acid 1 milliGRAM(s) Oral daily  heparin   Injectable 5000 Unit(s) SubCutaneous every 12 hours  lactulose Syrup 20 Gram(s) Oral every 6 hours  lidocaine 1%/epinephrine 1:100,000 Inj 2 Vial(s) Local Injection once  lidocaine 1%/epinephrine 1:100,000 Inj 3 Vial(s) Local Injection once  metoprolol tartrate 12.5 milliGRAM(s) Oral every 12 hours  midodrine. 10 milliGRAM(s) Oral three times a day  pantoprazole    Tablet 40 milliGRAM(s) Oral before breakfast  polyethylene glycol 3350 17 Gram(s) Oral daily  senna 2 Tablet(s) Oral at bedtime  sucralfate 1 Gram(s) Oral every 12 hours    MEDICATIONS  (PRN):  traMADol 25 milliGRAM(s) Oral every 6 hours PRN Severe Pain (7 - 10)    Home Medications:  furosemide 40 mg oral tablet: 1 tab(s) orally once a day (30 Jul 2024 04:41)  metoprolol tartrate 25 mg oral tablet: 1 tab(s) orally once a day (30 Jul 2024 04:41)      VITALS:  Daily     Daily   T(F): 98.6 (08-16-24 @ 07:30), Max: 98.6 (08-16-24 @ 07:30)  HR: 65 (08-16-24 @ 07:30)  BP: 106/55 (08-16-24 @ 07:30)  RR: 18 (08-16-24 @ 01:16)  SpO2: 95% (08-16-24 @ 01:16)  Wt(kg): --  I&O's Detail    15 Aug 2024 07:01  -  16 Aug 2024 07:00  --------------------------------------------------------  IN:  Total IN: 0 mL    OUT:    Indwelling Catheter - Urethral (mL): 700 mL    Oral Fluid: 0 mL    Other (mL): 500 mL  Total OUT: 1200 mL    Total NET: -1200 mL        I&O's Summary    15 Aug 2024 07:01  -  16 Aug 2024 07:00  --------------------------------------------------------  IN: 0 mL / OUT: 1200 mL / NET: -1200 mL          PHYSICAL EXAM:  Gen: awake, alert, in NAD  Chest: b/l breath sounds  Abd: soft  Extremities: no edema  Vascular access: R fem udall with dressing c/d/i  [place]      LABS:      08-15    141  |  105  |  61<HH>  ----------------------------<  105<H>  3.9   |  25  |  2.5<H>    Ca    7.5<L>      15 Aug 2024 05:53  Phos  3.8     08-15  Mg     2.4     08-15    TPro  4.7<L>  /  Alb  2.6<L>  /  TBili  0.6  /  DBili      /  AST  32  /  ALT  22  /  AlkPhos  89  08-15    Phosphorus: 3.8 mg/dL (08-15-24 @ 05:53)  Phosphorus: 6.0 mg/dL (08-12-24 @ 06:30)                        7.3    7.09  )-----------( 144      ( 15 Aug 2024 05:53 )             24.0     Mean Cell Volume: 101.7 fL (08-15-24 @ 05:53)    Ferritin: 675 ng/mL (08.15.24 @ 10:36)  % Saturation, Iron: 23 % (08-15-24 @ 10:36)    Creatinine trend:  Creatinine: 2.5 mg/dL (08-15-24 @ 05:53)  Creatinine: 2.2 mg/dL (08-14-24 @ 12:02)  Creatinine: 2.1 mg/dL (08-14-24 @ 07:26)  Creatinine: 2.5 mg/dL (08-12-24 @ 06:30)  Creatinine: 2.5 mg/dL (08-12-24 @ 03:55)

## 2024-08-16 NOTE — PROGRESS NOTE ADULT - ASSESSMENT
78-year-old female with past medical history of   CKD, HTN, CCY, gout,  severe AS being planned for tAVR, and history of severe anemia secondary to chronic upper GI bleeding due to  AV malformation gastric body dieulafoy lesion, weekly blood transfusions presenting to ER for a rupture pressure ulcer of the buttock area. Hospital course complicated by worsening encephalopathy s/p urgent HD, upgraded to SDU    # Worsening metabolic encephalopathy /Acute kidney inury on CKD stage 4/ rhabdomyolysis / Hyperammonemia   - mental status improved now, no indications for  brain MRI  - pt started on HD ( last HD today),  nephrology is following, daily assessment of Uldall need   - started on bumex 1mg q12h per nephrology   - CTAP done and reported-  No evidence of an intraperitoneal or retroperitoneal hematoma; CT evidence of anemia as above. Moderate rectal stool burden with associated rectal wall thickening and adjacent edema. Findings may reflect developing stercoral proctitis.  - patient started on Lactulose--> now having BM, goal 2-3 BM daily   - c/w Lokelma daily  - renal diet  - supportive care     # Bilateral buttocks and sacrum deep Tissue Injury  - ID following: hold antibiotics -  wounds without any gross signs of infection    - c/w wound care per burn, s/p debridement on 8/14  - off load pressure points, change position Q 2 hours   - pain control: tramadol 25mg PO q6h PRN and tylenol 1000mg q8h standing     # Acute on chronic  anemia/ History of AVMs (gastric and duodenal) and Dieulafoy Lesions Status Post Hemostasis in 2023/ History of perisplenic Varices in 2023   - s/p EGD/colonoscopy/push enteroscopy on 7/22 showing AVMs and polyps  - s/p 1U pRBC on 8/2, s/p course of venofer  - s/p additional 1U PRBC and 200mg IV venofer x1 for Hgb drop to 6.7   - on weekly IV transfusion/iron infusions per hematology  - monitor h/h, keep active T&S  - keep Hb above 7.0     # Chronic diastolic CHF/ Severe aortic stenosis   -  TTE Echo Complete w/o Contrast w/ Doppler (08.01.24 @ 11:03) >EF of 56 %. Mild asymmetric left ventricular hypertrophy. Grade II diastolic dysfunction). Severe aortic valve stenosis , Mild to moderate mitral valve regurgitation. Moderate aortic regurgitation.  - Patient to follow up with Dr. Hernandez as an outpatient for TAVR  - c/w metoprolol, c/w  Midodrine 10 q8H  - HD as scheduled     #Misc   - DVT ppx: heparin   - GI ppx: PPI   - Diet: Soft and bite sized   - Activity: IAT   - Code status: DNR/ DNI with NIV  - Dispo: [facility]   - Pending: nephro decision on Uldal, if needs further HD will need other access placement, monitor H/H  - Family discussion: updated son on plan of care at bedside     Overall prognosis is guarded         78-year-old female with past medical history of   CKD, HTN, CCY, gout,  severe AS being planned for tAVR, and history of severe anemia secondary to chronic upper GI bleeding due to  AV malformation gastric body dieulafoy lesion, weekly blood transfusions presenting to ER for a rupture pressure ulcer of the buttock area. Hospital course complicated by worsening encephalopathy s/p urgent HD, upgraded to SDU    # Worsening metabolic encephalopathy /Acute kidney inury on CKD stage 4/ rhabdomyolysis / Hyperammonemia   - mental status improved now, no indications for  brain MRI  - CTAP done and reported-  No evidence of an intraperitoneal or retroperitoneal hematoma; CT evidence of anemia as above. Moderate rectal stool burden with associated rectal wall thickening and adjacent edema. Findings may reflect developing stercoral proctitis.  - pt started on HD (last HD 8/15),  nephrology is following, daily assessment of Uldall need   - started on bumex 1mg q12h per nephrology   - nephrology recommending starting 5-day course of IV venofer, will hold off while patient is on abx   - patient started on Lactulose--> now having BM, goal 2-3 BM daily   - c/w Lokelma daily  - renal diet  - supportive care     # Bilateral buttocks and sacrum deep Tissue Injury  - ID following: recommended starting meropenem 1g q12h (UCs with ESBL represents GI kelley, can colonize sacrum and cause infection)    - c/w wound care per burn, s/p debridement on 8/14; f/u burn if additional debridement needed   - off load pressure points, change position Q 2 hours   - pain control: tramadol 25mg PO q6h PRN and tylenol 1000mg q8h standing     # Acute on chronic  anemia/ History of AVMs (gastric and duodenal) and Dieulafoy Lesions Status Post Hemostasis in 2023/ History of perisplenic Varices in 2023   - s/p EGD/colonoscopy/push enteroscopy on 7/22 showing AVMs and polyps  - s/p 1U pRBC on 8/2, s/p course of venofer  - s/p additional 1U PRBC and 200mg IV venofer x1 for Hgb drop to 6.7   - on weekly IV transfusion/iron infusions per hematology  - monitor h/h, keep active T&S  - keep Hb above 7.0     # Chronic diastolic CHF/ Severe aortic stenosis   -  TTE Echo Complete w/o Contrast w/ Doppler (08.01.24 @ 11:03) >EF of 56 %. Mild asymmetric left ventricular hypertrophy. Grade II diastolic dysfunction). Severe aortic valve stenosis , Mild to moderate mitral valve regurgitation. Moderate aortic regurgitation.  - Patient to follow up with Dr. Hernandez as an outpatient for TAVR  - c/w metoprolol, c/w  Midodrine 10 q8H  - HD as scheduled     #Misc   - DVT ppx: heparin   - GI ppx: PPI   - Diet: Soft and bite sized   - Activity: IAT   - Code status: DNR/ DNI with NIV  - Dispo: [facility]   - Pending: nephro decision on Uldal, if needs further HD will need other access placement, monitor H/H, hold off on additional IV venofer while pt is on abx (started today per ID),  - Family discussion: updated son on plan of care at bedside     Overall prognosis is guarded

## 2024-08-16 NOTE — PROGRESS NOTE ADULT - SUBJECTIVE AND OBJECTIVE BOX
24H events:    Patient is a 78y old Female who presents with a chief complaint of sacral wound (15 Aug 2024 08:25)    Primary diagnosis of Pressure ulcer    Today is hospital day 18d.   Patient reporting pain in her back and buttocks. Started on tramadol PRN.   Drop in Hgb to 6.7 this AM, will give 1U PRBC and 200mg IV venofer.     PAST MEDICAL & SURGICAL HISTORY  HTN (hypertension)    Heart murmur    Eczema    Anemia  iron infusions and PRBC transfusions    Aortic stenosis    Chronic kidney disease (CKD)     novel coronavirus disease (COVID-19)  2020- REHAB admission    Gastric AVM    H/O appendicitis    S/P cholecystectomy    History of esophagogastroduodenoscopy (EGD)    H/O colonoscopy    H/O  section    History of appendectomy    Port-A-Cath in place  right CW      SOCIAL HISTORY:  Social History:      ALLERGIES:  aspirin (Rash; Other)  penicillins (Rash; Urticaria; Hives; Blisters)  latex (Unknown)  EKG leads (Hives)    MEDICATIONS:  STANDING MEDICATIONS  acetaminophen     Tablet .. 1000 milliGRAM(s) Oral every 8 hours  buMETAnide 1 milliGRAM(s) Oral every 12 hours  chlorhexidine 2% Cloths 1 Application(s) Topical daily  epoetin raquel (EPOGEN) Injectable 09761 Unit(s) SubCutaneous every 7 days  folic acid 1 milliGRAM(s) Oral daily  heparin   Injectable 5000 Unit(s) SubCutaneous every 12 hours  lactulose Syrup 20 Gram(s) Oral every 6 hours  lidocaine 1%/epinephrine 1:100,000 Inj 2 Vial(s) Local Injection once  lidocaine 1%/epinephrine 1:100,000 Inj 3 Vial(s) Local Injection once  metoprolol tartrate 12.5 milliGRAM(s) Oral every 12 hours  midodrine. 10 milliGRAM(s) Oral three times a day  pantoprazole    Tablet 40 milliGRAM(s) Oral before breakfast  polyethylene glycol 3350 17 Gram(s) Oral daily  senna 2 Tablet(s) Oral at bedtime  sucralfate 1 Gram(s) Oral every 12 hours    PRN MEDICATIONS  traMADol 25 milliGRAM(s) Oral every 6 hours PRN    VITALS:   T(F): 98.6  HR: 65  BP: 106/55  RR: 18  SpO2: 95%    PHYSICAL EXAM:    GENERAL: NAD  NECK: Supple, no stiffness  HEART: Regular rate and rhythm, normal s1s2  LUNGS/CHEST: Unlabored respirations, b/l air entry, decreased breath sounds lung bases, R chest port c/d/i   ABDOMEN: Soft, nontender, nondistended; +BS  EXTREMITIES: No clubbing, cyanosis, or edema; 2+ peripheral pulses; R groin Uldall in place, c/d/i  NERVOUS SYSTEM: AOx3  SKIN: Warm and dry, no rashes or lesions    LABS:                        6.7    6.28  )-----------( 107      ( 16 Aug 2024 09:28 )             22.2     08-16    138  |  103  |  40<H>  ----------------------------<  115<H>  3.7   |  24  |  2.2<H>    Ca    7.4<L>      16 Aug 2024 09:28  Phos  3.8     08-15  Mg     2.1     08-16    TPro  4.3<L>  /  Alb  2.4<L>  /  TBili  0.6  /  DBili  x   /  AST  28  /  ALT  17  /  AlkPhos  81  08-16      Urinalysis Basic - ( 16 Aug 2024 09:28 )    Color: x / Appearance: x / SG: x / pH: x  Gluc: 115 mg/dL / Ketone: x  / Bili: x / Urobili: x   Blood: x / Protein: x / Nitrite: x   Leuk Esterase: x / RBC: x / WBC x   Sq Epi: x / Non Sq Epi: x / Bacteria: x                RADIOLOGY:           24H events:    Patient is a 78y old Female who presents with a chief complaint of sacral wound (15 Aug 2024 08:25)    Primary diagnosis of Pressure ulcer    Today is hospital day 18d.   Patient reporting pain in her back and buttocks. Started on tramadol PRN.   Drop in Hgb to 6.7 this AM, will give 1U PRBC and 200mg IV venofer.     PAST MEDICAL & SURGICAL HISTORY  HTN (hypertension)    Heart murmur    Eczema    Anemia  iron infusions and PRBC transfusions    Aortic stenosis    Chronic kidney disease (CKD)     novel coronavirus disease (COVID-19)  2020- REHAB admission    Gastric AVM    H/O appendicitis    S/P cholecystectomy    History of esophagogastroduodenoscopy (EGD)    H/O colonoscopy    H/O  section    History of appendectomy    Port-A-Cath in place  right CW      SOCIAL HISTORY:  Social History:      ALLERGIES:  aspirin (Rash; Other)  penicillins (Rash; Urticaria; Hives; Blisters)  latex (Unknown)  EKG leads (Hives)    MEDICATIONS:  STANDING MEDICATIONS  acetaminophen     Tablet .. 1000 milliGRAM(s) Oral every 8 hours  buMETAnide 1 milliGRAM(s) Oral every 12 hours  chlorhexidine 2% Cloths 1 Application(s) Topical daily  epoetin raquel (EPOGEN) Injectable 22444 Unit(s) SubCutaneous every 7 days  folic acid 1 milliGRAM(s) Oral daily  heparin   Injectable 5000 Unit(s) SubCutaneous every 12 hours  lactulose Syrup 20 Gram(s) Oral every 6 hours  lidocaine 1%/epinephrine 1:100,000 Inj 2 Vial(s) Local Injection once  lidocaine 1%/epinephrine 1:100,000 Inj 3 Vial(s) Local Injection once  metoprolol tartrate 12.5 milliGRAM(s) Oral every 12 hours  midodrine. 10 milliGRAM(s) Oral three times a day  pantoprazole    Tablet 40 milliGRAM(s) Oral before breakfast  polyethylene glycol 3350 17 Gram(s) Oral daily  senna 2 Tablet(s) Oral at bedtime  sucralfate 1 Gram(s) Oral every 12 hours    PRN MEDICATIONS  traMADol 25 milliGRAM(s) Oral every 6 hours PRN    VITALS:   T(F): 98.6  HR: 65  BP: 106/55  RR: 18  SpO2: 95%    PHYSICAL EXAM:    GENERAL: NAD  NECK: Supple, no stiffness  HEART: Regular rate and rhythm, normal s1s2  LUNGS/CHEST: Unlabored respirations, b/l air entry, decreased breath sounds lung bases, R chest port c/d/i   ABDOMEN: Soft, nontender, nondistended; +BS  EXTREMITIES: No clubbing, cyanosis, or edema; 2+ peripheral pulses; R groin Uldall in place, c/d/i  NERVOUS SYSTEM: AOx3  SKIN: Warm and dry, no rashes or lesions    LABS:                        6.7    6.28  )-----------( 107      ( 16 Aug 2024 09:28 )             22.2     08-16    138  |  103  |  40<H>  ----------------------------<  115<H>  3.7   |  24  |  2.2<H>    Ca    7.4<L>      16 Aug 2024 09:28  Phos  3.8     08-15  Mg     2.1     08-16    TPro  4.3<L>  /  Alb  2.4<L>  /  TBili  0.6  /  DBili  x   /  AST  28  /  ALT  17  /  AlkPhos  81  08-16      Urinalysis Basic - ( 16 Aug 2024 09:28 )    Color: x / Appearance: x / SG: x / pH: x  Gluc: 115 mg/dL / Ketone: x  / Bili: x / Urobili: x   Blood: x / Protein: x / Nitrite: x   Leuk Esterase: x / RBC: x / WBC x   Sq Epi: x / Non Sq Epi: x / Bacteria: x      RADIOLOGY:

## 2024-08-16 NOTE — PROGRESS NOTE ADULT - ASSESSMENT
78-year-old female with past medical history of   CKD, HTN, CCY, gout,  severe AS being planned for TAVR, and history of severe anemia secondary to chronic upper GI bleeding due to  AV malformation gastric body dieulafoy lesion, weekly blood transfusions presenting to ER for a rupture pressure ulcer of the buttock area.  #EVAN on CKD4/ anemia   #AMS  - s/p HD yesterday / mental status still waxing and waning  - creat remains at baseline / monitor and document urine output  - if creat remains stable can d/c udall  - non oliguric  - cont bumex 1 q 12   - no hydro on renal/bladder sono   - last phos noted; monitor  - BP noted; continue  midodrine 10mg q8h    - cont WOODY wkly, Tsat low-- start venofer 200mg daily x5 doses / RBC transfusion as needed to maintain hgb > 7   - neuro f/u   - overall prognosis poor   will follow

## 2024-08-16 NOTE — PROGRESS NOTE ADULT - SUBJECTIVE AND OBJECTIVE BOX
78-year-old female with past medical history of CKD, HTN, CCY, gout, severe AS being planned for tAVR, and history of severe anemia secondary to chronic upper GI bleeding due to  AV malformation gastric body dieulafoy lesion, weekly blood transfusions presenting to ER for a rupture pressure ulcer of the buttock area today.   Patient states that last night was on her toilet which cracked while she was sitting on it, and patient was unable to get off the toilet for 4 hours.  By the time EMS arrived skin had ulcerated, with a blister that ruptured predominantly to the left buttock area (appears to be like burn blister that ruptured) with linear abrasions to the bilateral upper thighs/buttocks.  Patient in so much pain that she cannot stand or walk, Of note, pt was recently admitted to the hospital between - for hematochezia, s/p 1 unit prbc. Denies fever, chills. N/V, abd pain.   Hospital course complicated by EVAN and worsening encephalopathy, possibly due to uremia, s/p urgent HD, upgraded to SDU. Ammonia level 206. CK 2700, now improved. Hyperkalemia, improved on lokelma. EVAN improved, back to baseline creat 2.5. CTAP done and reported-  No evidence of an intraperitoneal or retroperitoneal hematoma; CT evidence of anemia as above. Moderate rectal stool burden with associated rectal wall thickening and adjacent edema. Findings may reflect developing stercoral proctitis. Patient started on lactulose, which resolved constipation. Goal 2 bowel movements per day.  Code stroke called  due to worsening mental status since . CT/CTA head negative for acute intracranial process. Repeat CT head  stable. [place] placed by vascular and started HD . Had another session HD .  Mental status significantly improved, aao x3, cleared for PO diet per s/s.  Originally presented for bilateral buttocks and sacrum deep Tissue Injury. Followed by burn, recommended for bedside debridement.   Patient has hx of   anemia. History of AVMs (gastric and duodenal) and Dieulafoy Lesions Status Post Hemostasis in . History of perisplenic Varices in . S/p EGD/colonoscopy/push enteroscopy on  showing AVMs and polyps. S/p1U pRBC on , s/p course of venofer. On weekly IV transfusion/iron infusions per hematology.   Patient has history of Chronic diastolic CHF and severe aortic stenosis , OP  with Dr. Hernandez recommended  for TAVR  Patient is DNR/ DNI with NIV trial   Today pt is awake and alert, c/o back pain, asking for pain medication.     PAST MEDICAL & SURGICAL HISTORY:  HTN (hypertension)  Heart murmur  Eczema  Anemia  iron infusions and PRBC transfusions  Aortic stenosis  Chronic kidney disease (CKD)   novel coronavirus disease (COVID-19)  2020- REHAB admission  Gastric AVM  H/O appendicitis  S/P cholecystectomy  History of esophagogastroduodenoscopy (EGD)  H/O colonoscopy  H/O  section  History of appendectomy  Port-A-Cath in place  right CW      Vital Signs Last 24 Hrs  T(C): 37 (16 Aug 2024 07:30), Max: 37 (16 Aug 2024 07:30)  T(F): 98.6 (16 Aug 2024 07:30), Max: 98.6 (16 Aug 2024 07:30)  HR: 65 (16 Aug 2024 07:30) (65 - 76)  BP: 106/55 (16 Aug 2024 07:30) (94/46 - 120/55)  BP(mean): 67 (15 Aug 2024 20:00) (67 - 67)  RR: 18 (16 Aug 2024 01:16) (18 - 20)  SpO2: 95% (16 Aug 2024 01:16) (95% - 100%)    Parameters below as of 16 Aug 2024 01:16  Patient On (Oxygen Delivery Method): room air        PHYSICAL EXAM:  GENERAL: NAD, awake answering questions   HEAD:  Atraumatic, Normocephalic  NECK: Supple, No JVD, Normal thyroid  NERVOUS SYSTEM:  Alert & Oriented X3, following commands, answering questions   CHEST/LUNG: decreased BS at bases, chemo port noted right chest   HEART: Regular rate and rhythm; No murmurs, rubs, or gallops  ABDOMEN: Soft, Nontender, Nondistended; Bowel sounds present  EXTREMITIES:  2+ Peripheral Pulses, No clubbing, cyanosis, or edema, Udol noted in right groin     LABS:                                              6.7    6.28  )-----------( 107      ( 16 Aug 2024 09:28 )             22.2   08-16    138  |  103  |  40<H>  ----------------------------<  115<H>  3.7   |  24  |  2.2<H>    Ca    7.4<L>      16 Aug 2024 09:28  Phos  3.8     08-15  Mg     2.1     08-16    TPro  4.3<L>  /  Alb  2.4<L>  /  TBili  0.6  /  DBili  x   /  AST  28  /  ALT  17  /  AlkPhos  81  08-16      Culture Results:   >100,000 CFU/ml Enterobacter cloacae complex  Organism Identification: Enterobacter cloacae complex  Organism: Enterobacter cloacae complex  Method Type: [facility]    Urinalysis Basic - ( 14 Aug 2024 12:02 )    Color: x / Appearance: x / SG: x / pH: x  Gluc: 92 mg/dL / Ketone: x  / Bili: x / Urobili: x   Blood: x / Protein: x / Nitrite: x   Leuk Esterase: x / RBC: x / WBC x   Sq Epi: x / Non Sq Epi: x / Bacteria: x      Culture - Urine (24 @ 12:55)   Specimen Source: Clean Catch Clean Catch (Midstream)  Culture Results:   >100,000 CFU/ml Enterobacter cloacae complex   Susceptibility to follow.      RADIOLOGY & ADDITIONAL TESTS:      < from: CT Head No Cont (24 @ 21:55) >  IMPRESSION:    Unremarkable study. Stable since .    < end of copied text >  < from: [facility] Duplex Lower Ext Vein Scan, Bilat (24 @ 17:17) >    IMPRESSION:  No evidence of deep venous thrombosis in either lower extremity.    < end of copied text >  < from: Xray Kidney Ureter Bladder (24 @ 01:24) >  FINDINGS/  IMPRESSION:    Moderate colonic stool. Nonspecific nonobstructive bowel gas pattern.   Degenerative changes in the spine, sacroiliac joints and bilateral hips.   Right femoral dialysis catheter tip projecting over the sacrum, likely   common iliac vein or IVC.    < end of copied text >  < from: CT Angio Brain Stroke Protocol  w/ IV Cont (24 @ 09:41) >    IMPRESSION:    CT PERFUSION: No perfusion abnormality.    CTA HEAD/NECK: No evidence of acute large vessel occlusion. 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 Mild   scattered atheromatous changes as above.    < end of copied text >  < from: TTE Echo Complete w/o Contrast w/ Doppler (24 @ 11:03) >  Summary:   1. Normal global left ventricular systolic function.   2. LV Ejection Fraction by Brown's Method with a biplane EF of 56 %.   3. Mild asymmetric left ventricular hypertrophy (13 mm).   4. Spectral Doppler shows pseudonormal pattern of left ventricular   myocardial filling (Grade II diastolic dysfunction). Elevated mean left   atrial pressure.   5. Normal right ventricular size and function.   6. Severe aortic valve stenosis (VMax 5.92 m/s, PG//90 mmHg, SHILA   0.88 cm2).   7. Moderate aortic regurgitation.   8. Mild to moderate mitral valve regurgitation.   9.Mild tricuspid regurgitation.  10. Estimated pulmonary artery systolic pressure is 39.4 mmHg assuming a   right atrial pressure of 13 mmHg, which is consistent with borderline   pulmonary hypertension.    MEDICATIONS  (STANDING):  acetaminophen     Tablet .. 1000 milliGRAM(s) Oral every 8 hours  buMETAnide 1 milliGRAM(s) Oral every 12 hours  chlorhexidine 2% Cloths 1 Application(s) Topical daily  epoetin raquel (EPOGEN) Injectable 38428 Unit(s) SubCutaneous every 7 days  folic acid 1 milliGRAM(s) Oral daily  heparin   Injectable 5000 Unit(s) SubCutaneous every 12 hours  lactulose Syrup 20 Gram(s) Oral every 6 hours  lidocaine 1%/epinephrine 1:100,000 Inj 3 Vial(s) Local Injection once  lidocaine 1%/epinephrine 1:100,000 Inj 2 Vial(s) Local Injection once  meropenem  IVPB 1000 milliGRAM(s) IV Intermittent every 12 hours  metoprolol tartrate 12.5 milliGRAM(s) Oral every 12 hours  midodrine. 10 milliGRAM(s) Oral three times a day  pantoprazole    Tablet 40 milliGRAM(s) Oral before breakfast  polyethylene glycol 3350 17 Gram(s) Oral daily  senna 2 Tablet(s) Oral at bedtime  sucralfate 1 Gram(s) Oral every 12 hours    MEDICATIONS  (PRN):  traMADol 25 milliGRAM(s) Oral every 6 hours PRN Severe Pain (7 - 10)

## 2024-08-16 NOTE — CONSULT NOTE ADULT - ASSESSMENT
78-year-old female with past medical history of   CKD, HTN, CCY, gout,  severe AS being planned for tAVR, and history of severe anemia secondary to chronic upper GI bleeding due to  AV malformation gastric body dieulafoy lesion, weekly blood transfusions presenting to ER for a rupture pressure ulcer of the buttock area today.   Patient states that last night was on her toilet which cracked while she was sitting on it, and patient was unable to get off the toilet for 4 hours.  By the time EMS arrived skin had ulcerated, with a blister that ruptured predominantly to the left buttock area (appears to be like burn blister that ruptured) with linear abrasions to the bilateral upper thighs/buttocks.    IMPRESSION/RECOMMENDATIONS  Immunosuppression/Immunosenescence ( above age 60 yrs there is a exponential decline in immunity which could result in poor clinical outcomes.  Sacrum : pressure ulcer with necrosis/ localized abscess/cellulitis  8/14 s/p debridement by Burn  Asymptomatic bacteuria with ESBL Enterobacter with culver catheter  No pyuria  No pyelonephritis  EVAN  No PNA  8/12 Doppler ; no DVT    -Off loading to prevent pressure sores and preventive measures to avoid aspiration  -bedside PT/Rehab. Out of bed to chair if possible.   -f/u with Burn for possible further debridement  -Meropenem 1 gm iv q12h

## 2024-08-17 LAB
ANION GAP SERPL CALC-SCNC: 10 MMOL/L — SIGNIFICANT CHANGE UP (ref 7–14)
BASOPHILS # BLD AUTO: 0.01 K/UL — SIGNIFICANT CHANGE UP (ref 0–0.2)
BASOPHILS NFR BLD AUTO: 0.2 % — SIGNIFICANT CHANGE UP (ref 0–1)
BUN SERPL-MCNC: 49 MG/DL — HIGH (ref 10–20)
CALCIUM SERPL-MCNC: 7.7 MG/DL — LOW (ref 8.4–10.5)
CHLORIDE SERPL-SCNC: 99 MMOL/L — SIGNIFICANT CHANGE UP (ref 98–110)
CO2 SERPL-SCNC: 23 MMOL/L — SIGNIFICANT CHANGE UP (ref 17–32)
CREAT SERPL-MCNC: 2.8 MG/DL — HIGH (ref 0.7–1.5)
EGFR: 17 ML/MIN/1.73M2 — LOW
EOSINOPHIL # BLD AUTO: 0.29 K/UL — SIGNIFICANT CHANGE UP (ref 0–0.7)
EOSINOPHIL NFR BLD AUTO: 5.4 % — SIGNIFICANT CHANGE UP (ref 0–8)
GLUCOSE SERPL-MCNC: 106 MG/DL — HIGH (ref 70–99)
HCT VFR BLD CALC: 25.8 % — LOW (ref 37–47)
HGB BLD-MCNC: 8.1 G/DL — LOW (ref 12–16)
IMM GRANULOCYTES NFR BLD AUTO: 0.6 % — HIGH (ref 0.1–0.3)
LYMPHOCYTES # BLD AUTO: 0.46 K/UL — LOW (ref 1.2–3.4)
LYMPHOCYTES # BLD AUTO: 8.6 % — LOW (ref 20.5–51.1)
MAGNESIUM SERPL-MCNC: 2.2 MG/DL — SIGNIFICANT CHANGE UP (ref 1.8–2.4)
MCHC RBC-ENTMCNC: 31.4 G/DL — LOW (ref 32–37)
MCHC RBC-ENTMCNC: 31.4 PG — HIGH (ref 27–31)
MCV RBC AUTO: 100 FL — HIGH (ref 81–99)
MONOCYTES # BLD AUTO: 0.6 K/UL — SIGNIFICANT CHANGE UP (ref 0.1–0.6)
MONOCYTES NFR BLD AUTO: 11.2 % — HIGH (ref 1.7–9.3)
NEUTROPHILS # BLD AUTO: 3.98 K/UL — SIGNIFICANT CHANGE UP (ref 1.4–6.5)
NEUTROPHILS NFR BLD AUTO: 74 % — SIGNIFICANT CHANGE UP (ref 42.2–75.2)
NRBC # BLD: 0 /100 WBCS — SIGNIFICANT CHANGE UP (ref 0–0)
PLATELET # BLD AUTO: 91 K/UL — LOW (ref 130–400)
PMV BLD: 10.7 FL — HIGH (ref 7.4–10.4)
POTASSIUM SERPL-MCNC: 4.2 MMOL/L — SIGNIFICANT CHANGE UP (ref 3.5–5)
POTASSIUM SERPL-SCNC: 4.2 MMOL/L — SIGNIFICANT CHANGE UP (ref 3.5–5)
RBC # BLD: 2.58 M/UL — LOW (ref 4.2–5.4)
RBC # FLD: 20.8 % — HIGH (ref 11.5–14.5)
SODIUM SERPL-SCNC: 132 MMOL/L — LOW (ref 135–146)
WBC # BLD: 5.37 K/UL — SIGNIFICANT CHANGE UP (ref 4.8–10.8)
WBC # FLD AUTO: 5.37 K/UL — SIGNIFICANT CHANGE UP (ref 4.8–10.8)

## 2024-08-17 PROCEDURE — 99233 SBSQ HOSP IP/OBS HIGH 50: CPT

## 2024-08-17 RX ADMIN — Medication 20 GRAM(S): at 17:33

## 2024-08-17 RX ADMIN — EPOETIN ALFA 10000 UNIT(S): 3000 SOLUTION INTRAVENOUS; SUBCUTANEOUS at 13:18

## 2024-08-17 RX ADMIN — MIDODRINE HYDROCHLORIDE 10 MILLIGRAM(S): 5 TABLET ORAL at 05:03

## 2024-08-17 RX ADMIN — ACETAMINOPHEN 1000 MILLIGRAM(S): 325 TABLET ORAL at 06:28

## 2024-08-17 RX ADMIN — Medication 5000 UNIT(S): at 05:04

## 2024-08-17 RX ADMIN — Medication 20 GRAM(S): at 05:04

## 2024-08-17 RX ADMIN — Medication 2 TABLET(S): at 21:38

## 2024-08-17 RX ADMIN — METOPROLOL TARTRATE 12.5 MILLIGRAM(S): 100 TABLET ORAL at 17:34

## 2024-08-17 RX ADMIN — SUCRALFATE 1 GRAM(S): 1 SUSPENSION ORAL at 05:03

## 2024-08-17 RX ADMIN — Medication 40 MILLIGRAM(S): at 05:11

## 2024-08-17 RX ADMIN — Medication 20 GRAM(S): at 11:12

## 2024-08-17 RX ADMIN — MEROPENEM 100 MILLIGRAM(S): 500 INJECTION, POWDER, FOR SOLUTION INTRAVENOUS at 17:39

## 2024-08-17 RX ADMIN — MEROPENEM 100 MILLIGRAM(S): 500 INJECTION, POWDER, FOR SOLUTION INTRAVENOUS at 05:02

## 2024-08-17 RX ADMIN — CHLORHEXIDINE GLUCONATE 1 APPLICATION(S): 40 SOLUTION TOPICAL at 11:13

## 2024-08-17 RX ADMIN — SUCRALFATE 1 GRAM(S): 1 SUSPENSION ORAL at 17:35

## 2024-08-17 RX ADMIN — BUMETANIDE 1 MILLIGRAM(S): 2 TABLET ORAL at 17:36

## 2024-08-17 RX ADMIN — BUMETANIDE 1 MILLIGRAM(S): 2 TABLET ORAL at 05:03

## 2024-08-17 RX ADMIN — ACETAMINOPHEN 1000 MILLIGRAM(S): 325 TABLET ORAL at 05:18

## 2024-08-17 RX ADMIN — ACETAMINOPHEN 1000 MILLIGRAM(S): 325 TABLET ORAL at 22:10

## 2024-08-17 RX ADMIN — Medication 5000 UNIT(S): at 17:33

## 2024-08-17 RX ADMIN — ACETAMINOPHEN 1000 MILLIGRAM(S): 325 TABLET ORAL at 13:16

## 2024-08-17 RX ADMIN — ACETAMINOPHEN 1000 MILLIGRAM(S): 325 TABLET ORAL at 21:38

## 2024-08-17 RX ADMIN — MIDODRINE HYDROCHLORIDE 10 MILLIGRAM(S): 5 TABLET ORAL at 17:39

## 2024-08-17 RX ADMIN — MIDODRINE HYDROCHLORIDE 10 MILLIGRAM(S): 5 TABLET ORAL at 11:12

## 2024-08-17 RX ADMIN — METOPROLOL TARTRATE 12.5 MILLIGRAM(S): 100 TABLET ORAL at 05:04

## 2024-08-17 RX ADMIN — Medication 1 MILLIGRAM(S): at 11:13

## 2024-08-17 RX ADMIN — POLYETHYLENE GLYCOL 3350 17 GRAM(S): 17 POWDER, FOR SOLUTION ORAL at 11:12

## 2024-08-17 NOTE — PROGRESS NOTE ADULT - SUBJECTIVE AND OBJECTIVE BOX
SUBJECTIVE:    Patient is a 78y old Female who presents with a chief complaint of sacral wound (15 Aug 2024 08:25)    Currently admitted to medicine with the primary diagnosis of Pressure ulcer       Today is hospital day 19d. This morning she is resting comfortably in bed and reports no new issues or overnight events.     PAST MEDICAL & SURGICAL HISTORY  HTN (hypertension)    Heart murmur    Eczema    Anemia  iron infusions and PRBC transfusions    Aortic stenosis    Chronic kidney disease (CKD)     novel coronavirus disease (COVID-19)  2020- REHAB admission    Gastric AVM    H/O appendicitis    S/P cholecystectomy    History of esophagogastroduodenoscopy (EGD)    H/O colonoscopy    H/O  section    History of appendectomy    Port-A-Cath in place  right CW      SOCIAL HISTORY:  Negative for smoking/alcohol/drug use.     ALLERGIES:  aspirin (Rash; Other)  penicillins (Rash; Urticaria; Hives; Blisters)  latex (Unknown)  EKG leads (Hives)    MEDICATIONS:  STANDING MEDICATIONS  acetaminophen     Tablet .. 1000 milliGRAM(s) Oral every 8 hours  buMETAnide 1 milliGRAM(s) Oral every 12 hours  chlorhexidine 2% Cloths 1 Application(s) Topical daily  epoetin raquel (EPOGEN) Injectable 10020 Unit(s) SubCutaneous every 7 days  folic acid 1 milliGRAM(s) Oral daily  heparin   Injectable 5000 Unit(s) SubCutaneous every 12 hours  lactulose Syrup 20 Gram(s) Oral every 6 hours  lidocaine 1%/epinephrine 1:100,000 Inj 3 Vial(s) Local Injection once  lidocaine 1%/epinephrine 1:100,000 Inj 2 Vial(s) Local Injection once  meropenem  IVPB 1000 milliGRAM(s) IV Intermittent every 12 hours  metoprolol tartrate 12.5 milliGRAM(s) Oral every 12 hours  midodrine. 10 milliGRAM(s) Oral three times a day  pantoprazole    Tablet 40 milliGRAM(s) Oral before breakfast  polyethylene glycol 3350 17 Gram(s) Oral daily  senna 2 Tablet(s) Oral at bedtime  sucralfate 1 Gram(s) Oral every 12 hours    PRN MEDICATIONS  traMADol 25 milliGRAM(s) Oral every 6 hours PRN    VITALS:   T(F): 97.7  HR: 70  BP: 93/55  RR: 16  SpO2: 99%    LABS:                        8.1    5.37  )-----------( 91       ( 17 Aug 2024 09:28 )             25.8     08-17    132<L>  |  99  |  49<H>  ----------------------------<  106<H>  4.2   |  23  |  2.8<H>    Ca    7.7<L>      17 Aug 2024 09:28  Mg     2.2     08-17    TPro  4.3<L>  /  Alb  2.4<L>  /  TBili  0.6  /  DBili  x   /  AST  28  /  ALT  17  /  AlkPhos  81  08-16      Urinalysis Basic - ( 17 Aug 2024 09:28 )    Color: x / Appearance: x / SG: x / pH: x  Gluc: 106 mg/dL / Ketone: x  / Bili: x / Urobili: x   Blood: x / Protein: x / Nitrite: x   Leuk Esterase: x / RBC: x / WBC x   Sq Epi: x / Non Sq Epi: x / Bacteria: x                RADIOLOGY:    PHYSICAL EXAM:  GEN: No acute distress  LUNGS: Clear to auscultation bilaterally   HEART: S1/S2 present. RRR.   ABD: Soft, non-tender, non-distended. Bowel sounds present  EXT: NC/NC/NE/2+PP/VERGARA  NEURO: AAOX3

## 2024-08-17 NOTE — PROGRESS NOTE ADULT - SUBJECTIVE AND OBJECTIVE BOX
seen and examined  24 h events noted   no distress   lying comfortable         PAST HISTORY  --------------------------------------------------------------------------------  No significant changes to PMH, PSH, FHx, SHx, unless otherwise noted    ALLERGIES & MEDICATIONS  --------------------------------------------------------------------------------  Allergies    aspirin (Rash; Other)  penicillins (Rash; Urticaria; Hives; Blisters)  latex (Unknown)  EKG leads (Hives)    Intolerances      Standing Inpatient Medications  acetaminophen     Tablet .. 1000 milliGRAM(s) Oral every 8 hours  buMETAnide 1 milliGRAM(s) Oral every 12 hours  chlorhexidine 2% Cloths 1 Application(s) Topical daily  epoetin raquel (EPOGEN) Injectable 23671 Unit(s) SubCutaneous every 7 days  folic acid 1 milliGRAM(s) Oral daily  heparin   Injectable 5000 Unit(s) SubCutaneous every 12 hours  lactulose Syrup 20 Gram(s) Oral every 6 hours  lidocaine 1%/epinephrine 1:100,000 Inj 3 Vial(s) Local Injection once  lidocaine 1%/epinephrine 1:100,000 Inj 2 Vial(s) Local Injection once  meropenem  IVPB 1000 milliGRAM(s) IV Intermittent every 12 hours  metoprolol tartrate 12.5 milliGRAM(s) Oral every 12 hours  midodrine. 10 milliGRAM(s) Oral three times a day  pantoprazole    Tablet 40 milliGRAM(s) Oral before breakfast  polyethylene glycol 3350 17 Gram(s) Oral daily  senna 2 Tablet(s) Oral at bedtime  sucralfate 1 Gram(s) Oral every 12 hours        VITALS/PHYSICAL EXAM  --------------------------------------------------------------------------------  T(C): 36.8 (08-17-24 @ 00:05), Max: 36.8 (08-17-24 @ 00:05)  HR: 76 (08-17-24 @ 00:05) (73 - 76)  BP: 109/64 (08-17-24 @ 00:05) (107/62 - 109/64)  RR: 18 (08-17-24 @ 00:05) (18 - 18)  SpO2: 100% (08-17-24 @ 00:05) (100% - 100%)  Wt(kg): --        08-16-24 @ 07:01  -  08-17-24 @ 07:00  --------------------------------------------------------  IN: 0 mL / OUT: 1050 mL / NET: -1050 mL      Physical Exam:  	Gen: NAD  	Pulm: decrease BS  B/L  	CV:  S1S2; no rub  	Abd: +distended  	Vascular access:    LABS/STUDIES  --------------------------------------------------------------------------------              8.0    6.06  >-----------<  96       [08-16-24 @ 18:02]              26.0     138  |  103  |  40  ----------------------------<  115      [08-16-24 @ 09:28]  3.7   |  24  |  2.2        Ca     7.4     [08-16-24 @ 09:28]      Mg     2.1     [08-16-24 @ 09:28]    TPro  4.3  /  Alb  2.4  /  TBili  0.6  /  DBili  x   /  AST  28  /  ALT  17  /  AlkPhos  81  [08-16-24 @ 09:28]      Creatinine Trend:  SCr 2.2 [08-16 @ 09:28]  SCr 2.5 [08-15 @ 05:53]  SCr 2.2 [08-14 @ 12:02]  SCr 2.1 [08-14 @ 07:26]  SCr 2.5 [08-12 @ 06:30]    Urinalysis - [08-16-24 @ 09:28]      Color  / Appearance  / SG  / pH       Gluc 115 / Ketone   / Bili  / Urobili        Blood  / Protein  / Leuk Est  / Nitrite       RBC  / WBC  / Hyaline  / Gran  / Sq Epi  / Non Sq Epi  / Bacteria       Iron 34, TIBC 150, %sat 23      [08-15-24 @ 10:36]  Ferritin 675      [08-15-24 @ 10:36]  TSH 4.36      [08-08-24 @ 09:40]    HBsAb Nonreact      [08-11-24 @ 11:39]  HBsAg Nonreact      [08-11-24 @ 11:39]  HBcAb Nonreact      [08-11-24 @ 11:39]

## 2024-08-17 NOTE — PROGRESS NOTE ADULT - ASSESSMENT
78-year-old female with past medical history of   CKD, HTN, CCY, gout,  severe AS being planned for tAVR, and history of severe anemia secondary to chronic upper GI bleeding due to  AV malformation gastric body dieulafoy lesion, weekly blood transfusions presenting to ER for a rupture pressure ulcer of the buttock area. Hospital course complicated by worsening encephalopathy s/p urgent HD, upgraded to SDU      A/P   # Worsening metabolic encephalopathy /Acute kidney inury on CKD stage 4/ rhabdomyolysis / Hyperammonemia   - mental status improved now, no indications for  brain MRI  - pt started on HD ( last HD on 8/15),  nephrology is following, keep Udol for now , monitor BUN/cr if stable d/c Udol - today Cr 2.8 up from 2.3, follow up nephrology   - CTAP done and reported-  No evidence of an intraperitoneal or retroperitoneal hematoma; CT evidence of anemia as above. Moderate rectal stool burden with associated rectal wall thickening and adjacent edema. Findings may reflect developing stercoral proctitis.  - patient started on Lactulose--> now having BM, goal 2-3 BM daily   - c/w Lokelma   - renal diet  - c/w Bumex 1 mg Q 12 hours ( recommended by nephrology)   - supportive care     # Bilateral buttocks and sacrum deep Tissue Injury/  bacteuria with ESBL Enterobacter with culver catheter  - ID following, started on Meropenem today   - c/w wound care per burn, s/p debridement on 8/14  - off load pressure points, change position Q 2 hours   - maintain fluid balance     # Acute on chronic  anemia/ History of AVMs (gastric and duodenal) and Dieulafoy Lesions Status Post Hemostasis in 2023/ History of perisplenic Varices in 2023   - s/p EGD/colonoscopy/push enteroscopy on 7/22 showing AVMs and polyps  - s/p 1U pRBC on 8/2, s/p course of Venofer  - on weekly IV transfusion/iron infusions per hematology  - monitor h/h, keep active T&S  - keep Hb above 7.0     # Chronic diastolic CHF/ Severe aortic stenosis   -  TTE Echo Complete w/o Contrast w/ Doppler (08.01.24 @ 11:03) >EF of 56 %. Mild asymmetric left ventricular hypertrophy. Grade II diastolic dysfunction). Severe aortic valve stenosis , Mild to moderate mitral valve regurgitation. Moderate aortic regurgitation.  - Patient to follow up with Dr. Hernandez as an outpatient for TAVR  - c/w metoprolol, c/w  Midodrine 10 q8H  - HD as scheduled       GOC : DNR/ DNI with NIV  Overall prognosis is guarded    #Progress Note Handoff  Pending (specify): monitor H/H,  start Meropenem,   monitor BUN/cr and urine output, if Cr is stable, d/c Udol ( confirm with nephrology),  PT/rehab evaluation   # DIspo: TBD

## 2024-08-17 NOTE — PROGRESS NOTE ADULT - ASSESSMENT
78-year-old female with past medical history of   CKD, HTN, CCY, gout,  severe AS being planned for TAVR, and history of severe anemia secondary to chronic upper GI bleeding due to  AV malformation gastric body dieulafoy lesion, weekly blood transfusions presenting to ER for a rupture pressure ulcer of the buttock area.  #EVAN on CKD4/ anemia   #AMS  - mental status still waxing and waning  - creat remains at baseline / non oliguric / follow cr today / no hd today if cr stable   - if creat remains stable  today can d/c udall  - cont bumex 1 q 12   - no hydro on renal/bladder sono   - last phos noted; monitor  - BP noted; continue  midodrine 10mg q8h    - cont WOODY wkly, Tsat low-  -  on atb followed by ID   - overall prognosis poor   will follow

## 2024-08-18 LAB
ANION GAP SERPL CALC-SCNC: 12 MMOL/L — SIGNIFICANT CHANGE UP (ref 7–14)
BASOPHILS # BLD AUTO: 0.01 K/UL — SIGNIFICANT CHANGE UP (ref 0–0.2)
BASOPHILS NFR BLD AUTO: 0.2 % — SIGNIFICANT CHANGE UP (ref 0–1)
BUN SERPL-MCNC: 57 MG/DL — HIGH (ref 10–20)
CALCIUM SERPL-MCNC: 7.3 MG/DL — LOW (ref 8.4–10.5)
CHLORIDE SERPL-SCNC: 100 MMOL/L — SIGNIFICANT CHANGE UP (ref 98–110)
CO2 SERPL-SCNC: 22 MMOL/L — SIGNIFICANT CHANGE UP (ref 17–32)
CREAT SERPL-MCNC: 3.2 MG/DL — HIGH (ref 0.7–1.5)
EGFR: 14 ML/MIN/1.73M2 — LOW
EOSINOPHIL # BLD AUTO: 0.48 K/UL — SIGNIFICANT CHANGE UP (ref 0–0.7)
EOSINOPHIL NFR BLD AUTO: 9.6 % — HIGH (ref 0–8)
GLUCOSE SERPL-MCNC: 136 MG/DL — HIGH (ref 70–99)
HCT VFR BLD CALC: 26.6 % — LOW (ref 37–47)
HGB BLD-MCNC: 8.2 G/DL — LOW (ref 12–16)
IMM GRANULOCYTES NFR BLD AUTO: 0.6 % — HIGH (ref 0.1–0.3)
LYMPHOCYTES # BLD AUTO: 0.87 K/UL — LOW (ref 1.2–3.4)
LYMPHOCYTES # BLD AUTO: 17.4 % — LOW (ref 20.5–51.1)
MAGNESIUM SERPL-MCNC: 2 MG/DL — SIGNIFICANT CHANGE UP (ref 1.8–2.4)
MCHC RBC-ENTMCNC: 30.8 G/DL — LOW (ref 32–37)
MCHC RBC-ENTMCNC: 30.8 PG — SIGNIFICANT CHANGE UP (ref 27–31)
MCV RBC AUTO: 100 FL — HIGH (ref 81–99)
MONOCYTES # BLD AUTO: 0.58 K/UL — SIGNIFICANT CHANGE UP (ref 0.1–0.6)
MONOCYTES NFR BLD AUTO: 11.6 % — HIGH (ref 1.7–9.3)
NEUTROPHILS # BLD AUTO: 3.04 K/UL — SIGNIFICANT CHANGE UP (ref 1.4–6.5)
NEUTROPHILS NFR BLD AUTO: 60.6 % — SIGNIFICANT CHANGE UP (ref 42.2–75.2)
NRBC # BLD: 0 /100 WBCS — SIGNIFICANT CHANGE UP (ref 0–0)
PLATELET # BLD AUTO: 114 K/UL — LOW (ref 130–400)
PMV BLD: 10.5 FL — HIGH (ref 7.4–10.4)
POTASSIUM SERPL-MCNC: 4 MMOL/L — SIGNIFICANT CHANGE UP (ref 3.5–5)
POTASSIUM SERPL-SCNC: 4 MMOL/L — SIGNIFICANT CHANGE UP (ref 3.5–5)
RBC # BLD: 2.66 M/UL — LOW (ref 4.2–5.4)
RBC # FLD: 20 % — HIGH (ref 11.5–14.5)
SODIUM SERPL-SCNC: 134 MMOL/L — LOW (ref 135–146)
WBC # BLD: 5.01 K/UL — SIGNIFICANT CHANGE UP (ref 4.8–10.8)
WBC # FLD AUTO: 5.01 K/UL — SIGNIFICANT CHANGE UP (ref 4.8–10.8)

## 2024-08-18 PROCEDURE — 99233 SBSQ HOSP IP/OBS HIGH 50: CPT

## 2024-08-18 RX ADMIN — SUCRALFATE 1 GRAM(S): 1 SUSPENSION ORAL at 06:03

## 2024-08-18 RX ADMIN — CHLORHEXIDINE GLUCONATE 1 APPLICATION(S): 40 SOLUTION TOPICAL at 11:10

## 2024-08-18 RX ADMIN — MIDODRINE HYDROCHLORIDE 10 MILLIGRAM(S): 5 TABLET ORAL at 17:15

## 2024-08-18 RX ADMIN — Medication 1 MILLIGRAM(S): at 11:09

## 2024-08-18 RX ADMIN — BUMETANIDE 1 MILLIGRAM(S): 2 TABLET ORAL at 17:17

## 2024-08-18 RX ADMIN — POLYETHYLENE GLYCOL 3350 17 GRAM(S): 17 POWDER, FOR SOLUTION ORAL at 11:09

## 2024-08-18 RX ADMIN — ACETAMINOPHEN 1000 MILLIGRAM(S): 325 TABLET ORAL at 13:06

## 2024-08-18 RX ADMIN — MEROPENEM 100 MILLIGRAM(S): 500 INJECTION, POWDER, FOR SOLUTION INTRAVENOUS at 17:19

## 2024-08-18 RX ADMIN — Medication 20 GRAM(S): at 06:04

## 2024-08-18 RX ADMIN — BUMETANIDE 1 MILLIGRAM(S): 2 TABLET ORAL at 06:03

## 2024-08-18 RX ADMIN — Medication 2 TABLET(S): at 21:25

## 2024-08-18 RX ADMIN — ACETAMINOPHEN 1000 MILLIGRAM(S): 325 TABLET ORAL at 06:30

## 2024-08-18 RX ADMIN — METOPROLOL TARTRATE 12.5 MILLIGRAM(S): 100 TABLET ORAL at 06:02

## 2024-08-18 RX ADMIN — Medication 5000 UNIT(S): at 17:15

## 2024-08-18 RX ADMIN — ACETAMINOPHEN 1000 MILLIGRAM(S): 325 TABLET ORAL at 13:04

## 2024-08-18 RX ADMIN — MIDODRINE HYDROCHLORIDE 10 MILLIGRAM(S): 5 TABLET ORAL at 06:03

## 2024-08-18 RX ADMIN — Medication 20 GRAM(S): at 17:15

## 2024-08-18 RX ADMIN — MIDODRINE HYDROCHLORIDE 10 MILLIGRAM(S): 5 TABLET ORAL at 11:07

## 2024-08-18 RX ADMIN — ACETAMINOPHEN 1000 MILLIGRAM(S): 325 TABLET ORAL at 21:24

## 2024-08-18 RX ADMIN — MEROPENEM 100 MILLIGRAM(S): 500 INJECTION, POWDER, FOR SOLUTION INTRAVENOUS at 06:07

## 2024-08-18 RX ADMIN — METOPROLOL TARTRATE 12.5 MILLIGRAM(S): 100 TABLET ORAL at 17:16

## 2024-08-18 RX ADMIN — ACETAMINOPHEN 1000 MILLIGRAM(S): 325 TABLET ORAL at 06:02

## 2024-08-18 RX ADMIN — Medication 20 GRAM(S): at 11:09

## 2024-08-18 RX ADMIN — Medication 5000 UNIT(S): at 06:06

## 2024-08-18 RX ADMIN — ACETAMINOPHEN 1000 MILLIGRAM(S): 325 TABLET ORAL at 21:55

## 2024-08-18 RX ADMIN — SUCRALFATE 1 GRAM(S): 1 SUSPENSION ORAL at 17:17

## 2024-08-18 RX ADMIN — Medication 40 MILLIGRAM(S): at 06:07

## 2024-08-18 NOTE — PROGRESS NOTE ADULT - ASSESSMENT
Spoke with patient re:message below. Patient wanted to schedule a video visit for a hospital f/u related to COVID-19. Patient states she is feeling better. Patient scheduled for video visit with Dr. Cortes for 5/13/2020.  ME   78-year-old female with past medical history of   CKD, HTN, CCY, gout,  severe AS being planned for TAVR, and history of severe anemia secondary to chronic upper GI bleeding due to  AV malformation gastric body dieulafoy lesion, weekly blood transfusions presenting to ER for a rupture pressure ulcer of the buttock area.  #EVAN on CKD4/ anemia   #AMS  - mental status still waxing and waning  - creat up but need to remove femoral U dall / has redness around exit site   - DC U dall Dc bumex   - follow BMP in AM / may need another U dall / TDC if planning further HD   - no hydro on renal/bladder sono   - last phos noted; monitor  - BP noted; continue  midodrine 10mg q8h    - cont WOODY wkly, Tsat low-  -  on atb followed by ID   - overall prognosis poor   will follow

## 2024-08-18 NOTE — PROGRESS NOTE ADULT - SUBJECTIVE AND OBJECTIVE BOX
Nephrology progress note    THIS IS AN INCOMPLETE NOTE . FULL NOTE TO FOLLOW SHORTLY    Patient is seen and examined, events over the last 24 h noted .    Allergies:  aspirin (Rash; Other)  penicillins (Rash; Urticaria; Hives; Blisters)  latex (Unknown)  EKG leads (Hives)    Hospital Medications:   MEDICATIONS  (STANDING):  acetaminophen     Tablet .. 1000 milliGRAM(s) Oral every 8 hours  buMETAnide 1 milliGRAM(s) Oral every 12 hours  chlorhexidine 2% Cloths 1 Application(s) Topical daily  epoetin raquel (EPOGEN) Injectable 16636 Unit(s) SubCutaneous every 7 days  folic acid 1 milliGRAM(s) Oral daily  heparin   Injectable 5000 Unit(s) SubCutaneous every 12 hours  lactulose Syrup 20 Gram(s) Oral every 6 hours  lidocaine 1%/epinephrine 1:100,000 Inj 2 Vial(s) Local Injection once  lidocaine 1%/epinephrine 1:100,000 Inj 3 Vial(s) Local Injection once  meropenem  IVPB 1000 milliGRAM(s) IV Intermittent every 12 hours  metoprolol tartrate 12.5 milliGRAM(s) Oral every 12 hours  midodrine. 10 milliGRAM(s) Oral three times a day  pantoprazole    Tablet 40 milliGRAM(s) Oral before breakfast  polyethylene glycol 3350 17 Gram(s) Oral daily  senna 2 Tablet(s) Oral at bedtime  sucralfate 1 Gram(s) Oral every 12 hours        VITALS:  T(F): 97.3 (08-18-24 @ 07:00), Max: 98 (08-17-24 @ 16:06)  HR: 60 (08-18-24 @ 07:00)  BP: 114/53 (08-18-24 @ 07:00)  RR: 19 (08-18-24 @ 07:00)  SpO2: 92% (08-18-24 @ 07:00)  Wt(kg): --    08-16 @ 07:01  -  08-17 @ 07:00  --------------------------------------------------------  IN: 0 mL / OUT: 1050 mL / NET: -1050 mL    08-17 @ 07:01  -  08-18 @ 07:00  --------------------------------------------------------  IN: 585 mL / OUT: 1300 mL / NET: -715 mL          PHYSICAL EXAM:  Constitutional: NAD  HEENT: anicteric sclera, oropharynx clear, MMM  Neck: No JVD  Respiratory: CTAB, no wheezes, rales or rhonchi  Cardiovascular: S1, S2, RRR  Gastrointestinal: BS+, soft, NT/ND  Extremities: No cyanosis or clubbing. No peripheral edema  :  No culver.   Skin: No rashes    LABS:  08-17    132<L>  |  99  |  49<H>  ----------------------------<  106<H>  4.2   |  23  |  2.8<H>  Creatinine Trend: 2.8<--, 2.2<--, 2.5<--, 2.2<--, 2.1<--, 2.5<--  Ca    7.7<L>      17 Aug 2024 09:28  Mg     2.2     08-17    TPro  4.3<L>  /  Alb  2.4<L>  /  TBili  0.6  /  DBili      /  AST  28  /  ALT  17  /  AlkPhos  81  08-16                          8.1    5.37  )-----------( 91       ( 17 Aug 2024 09:28 )             25.8       Urine Studies:  Urinalysis Basic - ( 17 Aug 2024 09:28 )    Color:  / Appearance:  / SG:  / pH:   Gluc: 106 mg/dL / Ketone:   / Bili:  / Urobili:    Blood:  / Protein:  / Nitrite:    Leuk Esterase:  / RBC:  / WBC    Sq Epi:  / Non Sq Epi:  / Bacteria:           Iron 34, TIBC 150, %sat 23      [08-15-24 @ 10:36]  Ferritin 675      [08-15-24 @ 10:36]  TSH 4.36      [08-08-24 @ 09:40]    HBsAb Nonreact      [08-11-24 @ 11:39]  HBsAg Nonreact      [08-11-24 @ 11:39]  HBcAb Nonreact      [08-11-24 @ 11:39]    Free Light Chains: kappa 11.98, lambda 7.77, ratio = 1.54      [01-19 @ 05:43]  Immunofixation Serum:   Weak IgG Lambda Band Identified    Reference Range: None Detected      [01-19-22 @ 05:43]  SPEP Interpretation: Weak Gamma-Migrating Paraprotein Identified      [01-19-22 @ 05:43]      RADIOLOGY & ADDITIONAL STUDIES:   Nephrology progress note    Patient is seen and examined, events over the last 24 h noted .  Lying in bed comfortable    Allergies:  aspirin (Rash; Other)  penicillins (Rash; Urticaria; Hives; Blisters)  latex (Unknown)  EKG leads (Hives)    Hospital Medications:   MEDICATIONS  (STANDING):  acetaminophen     Tablet .. 1000 milliGRAM(s) Oral every 8 hours  buMETAnide 1 milliGRAM(s) Oral every 12 hours  epoetin raquel (EPOGEN) Injectable 77922 Unit(s) SubCutaneous every 7 days  folic acid 1 milliGRAM(s) Oral daily  heparin   Injectable 5000 Unit(s) SubCutaneous every 12 hours  lactulose Syrup 20 Gram(s) Oral every 6 hours  lidocaine 1%/epinephrine 1:100,000 Inj 2 Vial(s) Local Injection once  lidocaine 1%/epinephrine 1:100,000 Inj 3 Vial(s) Local Injection once  meropenem  IVPB 1000 milliGRAM(s) IV Intermittent every 12 hours  metoprolol tartrate 12.5 milliGRAM(s) Oral every 12 hours  midodrine. 10 milliGRAM(s) Oral three times a day  pantoprazole    Tablet 40 milliGRAM(s) Oral before breakfast  polyethylene glycol 3350 17 Gram(s) Oral daily  senna 2 Tablet(s) Oral at bedtime  sucralfate 1 Gram(s) Oral every 12 hours        VITALS:  T(F): 97.3 (08-18-24 @ 07:00), Max: 98 (08-17-24 @ 16:06)  HR: 60 (08-18-24 @ 07:00)  BP: 114/53 (08-18-24 @ 07:00)  RR: 19 (08-18-24 @ 07:00)  SpO2: 92% (08-18-24 @ 07:00)      08-16 @ 07:01  -  08-17 @ 07:00  --------------------------------------------------------  IN: 0 mL / OUT: 1050 mL / NET: -1050 mL    08-17 @ 07:01  -  08-18 @ 07:00  --------------------------------------------------------  IN: 585 mL / OUT: 1300 mL / NET: -715 mL          PHYSICAL EXAM:  Constitutional: NAD  Respiratory: CTAB, no wheezes, rales or rhonchi  Cardiovascular: S1, S2, RRR  Gastrointestinal: BS+, soft, NT/ND  Extremities: No cyanosis or clubbing. No peripheral edema  :  No culver.   Skin: No rashes    LABS:  08-18    134<L>  |  100  |  57<H>  ----------------------------<  136<H>  4.0   |  22  |  3.2<H>    Ca    7.3<L>      18 Aug 2024 08:17  Mg     2.0     08-18 08-17    132<L>  |  99  |  49<H>  ----------------------------<  106<H>  4.2   |  23  |  2.8<H>  Creatinine Trend: 2.8<--, 2.2<--, 2.5<--, 2.2<--, 2.1<--, 2.5<--  Ca    7.7<L>      17 Aug 2024 09:28  Mg     2.2     08-17    TPro  4.3<L>  /  Alb  2.4<L>  /  TBili  0.6  /  DBili      /  AST  28  /  ALT  17  /  AlkPhos  81  08-16                          8.1    5.37  )-----------( 91       ( 17 Aug 2024 09:28 )             25.8       Urine Studies:  Urinalysis Basic - ( 17 Aug 2024 09:28 )    Color:  / Appearance:  / SG:  / pH:   Gluc: 106 mg/dL / Ketone:   / Bili:  / Urobili:    Blood:  / Protein:  / Nitrite:    Leuk Esterase:  / RBC:  / WBC    Sq Epi:  / Non Sq Epi:  / Bacteria:           Iron 34, TIBC 150, %sat 23      [08-15-24 @ 10:36]  Ferritin 675      [08-15-24 @ 10:36]  TSH 4.36      [08-08-24 @ 09:40]    HBsAb Nonreact      [08-11-24 @ 11:39]  HBsAg Nonreact      [08-11-24 @ 11:39]  HBcAb Nonreact      [08-11-24 @ 11:39]    Free Light Chains: kappa 11.98, lambda 7.77, ratio = 1.54      [01-19 @ 05:43]  Immunofixation Serum:   Weak IgG Lambda Band Identified    Reference Range: None Detected      [01-19-22 @ 05:43]  SPEP Interpretation: Weak Gamma-Migrating Paraprotein Identified      [01-19-22 @ 05:43]      RADIOLOGY & ADDITIONAL STUDIES:

## 2024-08-18 NOTE — PROGRESS NOTE ADULT - ASSESSMENT
78-year-old female with past medical history of   CKD, HTN, CCY, gout,  severe AS being planned for tAVR, and history of severe anemia secondary to chronic upper GI bleeding due to  AV malformation gastric body dieulafoy lesion, weekly blood transfusions presenting to ER for a rupture pressure ulcer of the buttock area. Hospital course complicated by worsening encephalopathy s/p urgent HD, upgraded to SDU      A/P   # Worsening metabolic encephalopathy /Acute kidney inury on CKD stage 4/ rhabdomyolysis / Hyperammonemia   - mental status improved now, no indications for  brain MRI  - pt started on HD ( last HD on 8/15),  nephrology is following, keep Udol for now , monitor BUN/cr if stable d/c Udol - today Cr 2.8 up from 2.3, follow up nephrology   - CTAP done and reported-  No evidence of an intraperitoneal or retroperitoneal hematoma; CT evidence of anemia as above. Moderate rectal stool burden with associated rectal wall thickening and adjacent edema. Findings may reflect developing stercoral proctitis.  - patient started on Lactulose--> now having BM, goal 2-3 BM daily   - c/w Lokelma   - renal diet  - c/w Bumex 1 mg Q 12 hours ( recommended by nephrology)   - supportive care     # Bilateral buttocks and sacrum deep Tissue Injury/  bacteuria with ESBL Enterobacter with culver catheter  - ID following, started on Meropenem   - c/w wound care per burn, s/p debridement on 8/14  - off load pressure points, change position Q 2 hours   - maintain fluid balance     # Acute on chronic  anemia/ History of AVMs (gastric and duodenal) and Dieulafoy Lesions Status Post Hemostasis in 2023/ History of perisplenic Varices in 2023   - s/p EGD/colonoscopy/push enteroscopy on 7/22 showing AVMs and polyps  - s/p 1U pRBC on 8/2, s/p course of Venofer  - on weekly IV transfusion/iron infusions per hematology  - monitor h/h, keep active T&S  - keep Hb above 7.0     # Chronic diastolic CHF/ Severe aortic stenosis   -  TTE Echo Complete w/o Contrast w/ Doppler (08.01.24 @ 11:03) >EF of 56 %. Mild asymmetric left ventricular hypertrophy. Grade II diastolic dysfunction). Severe aortic valve stenosis , Mild to moderate mitral valve regurgitation. Moderate aortic regurgitation.  - Patient to follow up with Dr. Hernandez as an outpatient for TAVR  - c/w metoprolol, c/w  Midodrine 10 q8H  - HD as scheduled       GOC : DNR/ DNI with NIV  Overall prognosis is guarded    #Progress Note Handoff  Pending (specify): monitor H/H,  start Meropenem,   monitor BUN/cr and urine output, if Cr is stable, d/c Udol ( confirm with nephrology),  PT/rehab evaluation   # DIspo: TBD   78-year-old female with past medical history of   CKD, HTN, CCY, gout,  severe AS being planned for tAVR, and history of severe anemia secondary to chronic upper GI bleeding due to  AV malformation gastric body dieulafoy lesion, weekly blood transfusions presenting to ER for a rupture pressure ulcer of the buttock area. Hospital course complicated by worsening encephalopathy s/p urgent HD, upgraded to SDU    A/P   # Worsening metabolic encephalopathy /Acute kidney inury on CKD stage 4/ rhabdomyolysis / Hyperammonemia   - mental status improved now, no indications for  brain MRI  - pt started on HD ( last HD on 8/15),  nephrology is following, discussed with nephrology today who advised right femoral U dall to be removed- i removed the [place], discussed with RN  - CTAP done and reported-  No evidence of an intraperitoneal or retroperitoneal hematoma; CT evidence of anemia as above. Moderate rectal stool burden with associated rectal wall thickening and adjacent edema. Findings may reflect developing stercoral proctitis.  - patient started on Lactulose--> now having BM, goal 2-3 BM daily   - c/w Lokelma   - renal diet  - c/w Bumex 1 mg Q 12 hours ( recommended by nephrology)   - supportive care     # Bilateral buttocks and sacrum deep Tissue Injury/  bacteuria with ESBL Enterobacter with culver catheter  - ID following, started on Meropenem    - c/w wound care per burn, s/p debridement on 8/14  - off load pressure points, change position Q 2 hours   - maintain fluid balance     # Acute on chronic  anemia/ History of AVMs (gastric and duodenal) and Dieulafoy Lesions Status Post Hemostasis in 2023/ History of perisplenic Varices in 2023   - s/p EGD/colonoscopy/push enteroscopy on 7/22 showing AVMs and polyps  - s/p 1U pRBC on 8/2, s/p course of Venofer  - on weekly IV transfusion/iron infusions per hematology  - monitor h/h, keep active T&S  - keep Hb above 7.0     # Chronic diastolic CHF/ Severe aortic stenosis   -  TTE Echo Complete w/o Contrast w/ Doppler (08.01.24 @ 11:03) >EF of 56 %. Mild asymmetric left ventricular hypertrophy. Grade II diastolic dysfunction). Severe aortic valve stenosis , Mild to moderate mitral valve regurgitation. Moderate aortic regurgitation.  - Patient to follow up with Dr. Hernandez as an outpatient for TAVR  - c/w metoprolol, c/w  Midodrine 10 q8H  - HD as scheduled     GOC : DNR/ DNI with NIV  Overall prognosis is guarded    #Progress Note Handoff  Pending (specify): monitor H/H,  Meropenem, monitor BUN/cr and urine output, PT/rehab evaluation   # DIspo: TBD

## 2024-08-18 NOTE — PROGRESS NOTE ADULT - SUBJECTIVE AND OBJECTIVE BOX
24H events:    Patient is a 78y old Female who presents with a chief complaint of sacral wound (15 Aug 2024 08:25)    Primary diagnosis of Pressure ulcer        Today is 20d of hospitalization.   No acute or major events overnight.      PAST MEDICAL & SURGICAL HISTORY  HTN (hypertension)    Heart murmur    Eczema    Anemia  iron infusions and PRBC transfusions    Aortic stenosis    Chronic kidney disease (CKD)     novel coronavirus disease (COVID-19)  2020- REHAB admission    Gastric AVM    H/O appendicitis    S/P cholecystectomy    History of esophagogastroduodenoscopy (EGD)    H/O colonoscopy    H/O  section    History of appendectomy    Port-A-Cath in place  right CW      SOCIAL HISTORY:  Social History:      ALLERGIES:  aspirin (Rash; Other)  penicillins (Rash; Urticaria; Hives; Blisters)  latex (Unknown)  EKG leads (Hives)    MEDICATIONS:  STANDING MEDICATIONS  acetaminophen     Tablet .. 1000 milliGRAM(s) Oral every 8 hours  buMETAnide 1 milliGRAM(s) Oral every 12 hours  chlorhexidine 2% Cloths 1 Application(s) Topical daily  epoetin raquel (EPOGEN) Injectable 75577 Unit(s) SubCutaneous every 7 days  folic acid 1 milliGRAM(s) Oral daily  heparin   Injectable 5000 Unit(s) SubCutaneous every 12 hours  lactulose Syrup 20 Gram(s) Oral every 6 hours  lidocaine 1%/epinephrine 1:100,000 Inj 2 Vial(s) Local Injection once  lidocaine 1%/epinephrine 1:100,000 Inj 3 Vial(s) Local Injection once  meropenem  IVPB 1000 milliGRAM(s) IV Intermittent every 12 hours  metoprolol tartrate 12.5 milliGRAM(s) Oral every 12 hours  midodrine. 10 milliGRAM(s) Oral three times a day  pantoprazole    Tablet 40 milliGRAM(s) Oral before breakfast  polyethylene glycol 3350 17 Gram(s) Oral daily  senna 2 Tablet(s) Oral at bedtime  sucralfate 1 Gram(s) Oral every 12 hours    PRN MEDICATIONS  traMADol 25 milliGRAM(s) Oral every 6 hours PRN    VITALS:   T(F): 97.8  HR: 71  BP: 107/52  RR: 18  SpO2: 92%    PHYSICAL EXAM:  GENERAL:   ( x) NAD, lying in bed comfortably     (  ) obtunded     (  ) lethargic     (  ) somnolent      NECK:  (x) Supple     (  ) neck stiffness     (  ) nuchal rigidity     (  )  no JVD     (  ) JVD present ( -- cm)    HEART:  Rate -->     (x) normal rate     (  ) bradycardic     (  ) tachycardic  Rhythm -->     (x) regular     (  ) regularly irregular     (  ) irregularly irregular  Murmurs -->     (x) normal s1s2     (  ) systolic murmur     (  ) diastolic murmur     (  ) continuous murmur      (  ) S3 present     (  ) S4 present    LUNGS:   ( x)Unlabored respirations     (  ) tachypnea  ( x) B/L air entry     (  ) decreased breath sounds in:  (location     )    ( x) no adventitious sound     (  ) crackles     (  ) wheezing      (  ) rhonchi      (specify location:       )  (  ) chest wall tenderness (specify location:       )    ABDOMEN:   ( x) Soft     (  ) tense   |   (  ) nondistended     (  ) distended   |   (  ) +BS     (  ) hypoactive bowel sounds     (  ) hyperactive bowel sounds  ( x) nontender     (  ) RUQ tenderness     (  ) RLQ tenderness     (  ) LLQ tenderness     (  ) epigastric tenderness     (  ) diffuse tenderness  (  ) Splenomegaly      (  ) Hepatomegaly      (  ) Jaundice     (  ) ecchymosis     EXTREMITIES:  ( x) Normal     (  ) Rash     (  ) ecchymosis     (  ) varicose veins      (  ) pitting edema     (  ) non-pitting edema   (  ) ulceration     (  ) gangrene:     (location:     )    NERVOUS SYSTEM:    ( x) A&Ox3     (  ) confused     (  ) lethargic  CN II-XII:     ( x) Intact     (  ) deficits found     (Specify:     )   Upper extremities:     (  ) no sensorimotor deficits     (  ) weakness     (  ) loss of proprioception/vibration     (  ) loss of touch/temperature (specify:    )  Lower extremities:     (  ) no sensorimotor deficits     (  ) weakness     (  ) loss of proprioception/vibration     (  ) loss of touch/temperature (specify:    )    SKIN:   (  ) No rashes or lesions     (  ) maculopapular rash     (  ) pustules     (  ) vesicles     (  ) ulcer     (  ) ecchymosis     (specify location:     )      LABS:                        8.1    5.37  )-----------( 91       ( 17 Aug 2024 09:28 )             25.8     08-17    132<L>  |  99  |  49<H>  ----------------------------<  106<H>  4.2   |  23  |  2.8<H>    Ca    7.7<L>      17 Aug 2024 09:28  Mg     2.2     -    TPro  4.3<L>  /  Alb  2.4<L>  /  TBili  0.6  /  DBili  x   /  AST  28  /  ALT  17  /  AlkPhos  81  -      Urinalysis Basic - ( 17 Aug 2024 09:28 )    Color: x / Appearance: x / SG: x / pH: x  Gluc: 106 mg/dL / Ketone: x  / Bili: x / Urobili: x   Blood: x / Protein: x / Nitrite: x   Leuk Esterase: x / RBC: x / WBC x   Sq Epi: x / Non Sq Epi: x / Bacteria: x                    ASSESSMENT AND PLAN      -------------------------------------------------------------------------------------------------------------------------------------  #DVT PPX:    #GI PPX:    #Diet    #Code Status:    #Activity Order    #Dispo/Needs    #Handoff

## 2024-08-18 NOTE — PROGRESS NOTE ADULT - ATTENDING COMMENTS
patient was seen and examined this morning   Assessment and plan as above  I edited the note  I removed the [place]

## 2024-08-19 LAB
ALBUMIN SERPL ELPH-MCNC: 2.4 G/DL — LOW (ref 3.5–5.2)
ALP SERPL-CCNC: 85 U/L — SIGNIFICANT CHANGE UP (ref 30–115)
ALT FLD-CCNC: 9 U/L — SIGNIFICANT CHANGE UP (ref 0–41)
ANION GAP SERPL CALC-SCNC: 13 MMOL/L — SIGNIFICANT CHANGE UP (ref 7–14)
AST SERPL-CCNC: 18 U/L — SIGNIFICANT CHANGE UP (ref 0–41)
BASOPHILS # BLD AUTO: 0.02 K/UL — SIGNIFICANT CHANGE UP (ref 0–0.2)
BASOPHILS NFR BLD AUTO: 0.4 % — SIGNIFICANT CHANGE UP (ref 0–1)
BILIRUB SERPL-MCNC: 0.4 MG/DL — SIGNIFICANT CHANGE UP (ref 0.2–1.2)
BUN SERPL-MCNC: 66 MG/DL — CRITICAL HIGH (ref 10–20)
CALCIUM SERPL-MCNC: 7.5 MG/DL — LOW (ref 8.4–10.5)
CHLORIDE SERPL-SCNC: 100 MMOL/L — SIGNIFICANT CHANGE UP (ref 98–110)
CO2 SERPL-SCNC: 24 MMOL/L — SIGNIFICANT CHANGE UP (ref 17–32)
CREAT SERPL-MCNC: 3.2 MG/DL — HIGH (ref 0.7–1.5)
EGFR: 14 ML/MIN/1.73M2 — LOW
EOSINOPHIL # BLD AUTO: 0.5 K/UL — SIGNIFICANT CHANGE UP (ref 0–0.7)
EOSINOPHIL NFR BLD AUTO: 10 % — HIGH (ref 0–8)
GLUCOSE SERPL-MCNC: 91 MG/DL — SIGNIFICANT CHANGE UP (ref 70–99)
HCT VFR BLD CALC: 26.1 % — LOW (ref 37–47)
HGB BLD-MCNC: 8.3 G/DL — LOW (ref 12–16)
IMM GRANULOCYTES NFR BLD AUTO: 0.4 % — HIGH (ref 0.1–0.3)
LYMPHOCYTES # BLD AUTO: 0.82 K/UL — LOW (ref 1.2–3.4)
LYMPHOCYTES # BLD AUTO: 16.4 % — LOW (ref 20.5–51.1)
MAGNESIUM SERPL-MCNC: 2.1 MG/DL — SIGNIFICANT CHANGE UP (ref 1.8–2.4)
MCHC RBC-ENTMCNC: 31.1 PG — HIGH (ref 27–31)
MCHC RBC-ENTMCNC: 31.8 G/DL — LOW (ref 32–37)
MCV RBC AUTO: 97.8 FL — SIGNIFICANT CHANGE UP (ref 81–99)
MONOCYTES # BLD AUTO: 0.61 K/UL — HIGH (ref 0.1–0.6)
MONOCYTES NFR BLD AUTO: 12.2 % — HIGH (ref 1.7–9.3)
NEUTROPHILS # BLD AUTO: 3.04 K/UL — SIGNIFICANT CHANGE UP (ref 1.4–6.5)
NEUTROPHILS NFR BLD AUTO: 60.6 % — SIGNIFICANT CHANGE UP (ref 42.2–75.2)
NRBC # BLD: 0 /100 WBCS — SIGNIFICANT CHANGE UP (ref 0–0)
PHOSPHATE SERPL-MCNC: 4.4 MG/DL — SIGNIFICANT CHANGE UP (ref 2.1–4.9)
PLATELET # BLD AUTO: 135 K/UL — SIGNIFICANT CHANGE UP (ref 130–400)
PMV BLD: 10.4 FL — SIGNIFICANT CHANGE UP (ref 7.4–10.4)
POTASSIUM SERPL-MCNC: 4.2 MMOL/L — SIGNIFICANT CHANGE UP (ref 3.5–5)
POTASSIUM SERPL-SCNC: 4.2 MMOL/L — SIGNIFICANT CHANGE UP (ref 3.5–5)
PROT SERPL-MCNC: 4.7 G/DL — LOW (ref 6–8)
RBC # BLD: 2.67 M/UL — LOW (ref 4.2–5.4)
RBC # FLD: 19.4 % — HIGH (ref 11.5–14.5)
SODIUM SERPL-SCNC: 137 MMOL/L — SIGNIFICANT CHANGE UP (ref 135–146)
WBC # BLD: 5.01 K/UL — SIGNIFICANT CHANGE UP (ref 4.8–10.8)
WBC # FLD AUTO: 5.01 K/UL — SIGNIFICANT CHANGE UP (ref 4.8–10.8)

## 2024-08-19 PROCEDURE — 99233 SBSQ HOSP IP/OBS HIGH 50: CPT

## 2024-08-19 RX ORDER — MEROPENEM 500 MG/10ML
500 INJECTION, POWDER, FOR SOLUTION INTRAVENOUS EVERY 12 HOURS
Refills: 0 | Status: DISCONTINUED | OUTPATIENT
Start: 2024-08-19 | End: 2024-08-20

## 2024-08-19 RX ADMIN — TRAMADOL HYDROCHLORIDE 25 MILLIGRAM(S): 200 TABLET, EXTENDED RELEASE ORAL at 12:43

## 2024-08-19 RX ADMIN — Medication 1 MILLIGRAM(S): at 12:08

## 2024-08-19 RX ADMIN — Medication 5000 UNIT(S): at 19:15

## 2024-08-19 RX ADMIN — ACETAMINOPHEN 1000 MILLIGRAM(S): 325 TABLET ORAL at 05:33

## 2024-08-19 RX ADMIN — MIDODRINE HYDROCHLORIDE 10 MILLIGRAM(S): 5 TABLET ORAL at 12:13

## 2024-08-19 RX ADMIN — CHLORHEXIDINE GLUCONATE 1 APPLICATION(S): 40 SOLUTION TOPICAL at 12:18

## 2024-08-19 RX ADMIN — SUCRALFATE 1 GRAM(S): 1 SUSPENSION ORAL at 19:14

## 2024-08-19 RX ADMIN — Medication 40 MILLIGRAM(S): at 05:31

## 2024-08-19 RX ADMIN — TRAMADOL HYDROCHLORIDE 25 MILLIGRAM(S): 200 TABLET, EXTENDED RELEASE ORAL at 06:45

## 2024-08-19 RX ADMIN — MEROPENEM 100 MILLIGRAM(S): 500 INJECTION, POWDER, FOR SOLUTION INTRAVENOUS at 05:32

## 2024-08-19 RX ADMIN — METOPROLOL TARTRATE 12.5 MILLIGRAM(S): 100 TABLET ORAL at 19:14

## 2024-08-19 RX ADMIN — SUCRALFATE 1 GRAM(S): 1 SUSPENSION ORAL at 05:31

## 2024-08-19 RX ADMIN — ACETAMINOPHEN 1000 MILLIGRAM(S): 325 TABLET ORAL at 06:05

## 2024-08-19 RX ADMIN — MIDODRINE HYDROCHLORIDE 10 MILLIGRAM(S): 5 TABLET ORAL at 19:15

## 2024-08-19 RX ADMIN — Medication 20 GRAM(S): at 12:09

## 2024-08-19 RX ADMIN — POLYETHYLENE GLYCOL 3350 17 GRAM(S): 17 POWDER, FOR SOLUTION ORAL at 12:09

## 2024-08-19 RX ADMIN — BUMETANIDE 1 MILLIGRAM(S): 2 TABLET ORAL at 19:15

## 2024-08-19 RX ADMIN — BUMETANIDE 1 MILLIGRAM(S): 2 TABLET ORAL at 05:32

## 2024-08-19 RX ADMIN — Medication 20 GRAM(S): at 19:14

## 2024-08-19 RX ADMIN — TRAMADOL HYDROCHLORIDE 25 MILLIGRAM(S): 200 TABLET, EXTENDED RELEASE ORAL at 20:18

## 2024-08-19 RX ADMIN — METOPROLOL TARTRATE 12.5 MILLIGRAM(S): 100 TABLET ORAL at 05:31

## 2024-08-19 RX ADMIN — Medication 20 GRAM(S): at 05:30

## 2024-08-19 RX ADMIN — Medication 5000 UNIT(S): at 05:33

## 2024-08-19 RX ADMIN — ACETAMINOPHEN 1000 MILLIGRAM(S): 325 TABLET ORAL at 13:41

## 2024-08-19 RX ADMIN — TRAMADOL HYDROCHLORIDE 25 MILLIGRAM(S): 200 TABLET, EXTENDED RELEASE ORAL at 06:15

## 2024-08-19 RX ADMIN — MEROPENEM 100 MILLIGRAM(S): 500 INJECTION, POWDER, FOR SOLUTION INTRAVENOUS at 19:13

## 2024-08-19 RX ADMIN — MIDODRINE HYDROCHLORIDE 10 MILLIGRAM(S): 5 TABLET ORAL at 05:31

## 2024-08-19 RX ADMIN — ACETAMINOPHEN 1000 MILLIGRAM(S): 325 TABLET ORAL at 14:00

## 2024-08-19 NOTE — PROGRESS NOTE ADULT - SUBJECTIVE AND OBJECTIVE BOX
seen and examined  24 he vents noted      PAST HISTORY  --------------------------------------------------------------------------------  No significant changes to PMH, PSH, FHx, SHx, unless otherwise noted    ALLERGIES & MEDICATIONS  --------------------------------------------------------------------------------  Allergies    aspirin (Rash; Other)  penicillins (Rash; Urticaria; Hives; Blisters)  latex (Unknown)  EKG leads (Hives)    Intolerances      Standing Inpatient Medications  acetaminophen     Tablet .. 1000 milliGRAM(s) Oral every 8 hours  buMETAnide 1 milliGRAM(s) Oral every 12 hours  chlorhexidine 2% Cloths 1 Application(s) Topical daily  epoetin raquel (EPOGEN) Injectable 32546 Unit(s) SubCutaneous every 7 days  folic acid 1 milliGRAM(s) Oral daily  heparin   Injectable 5000 Unit(s) SubCutaneous every 12 hours  lactulose Syrup 20 Gram(s) Oral every 6 hours  lidocaine 1%/epinephrine 1:100,000 Inj 2 Vial(s) Local Injection once  lidocaine 1%/epinephrine 1:100,000 Inj 3 Vial(s) Local Injection once  meropenem  IVPB 1000 milliGRAM(s) IV Intermittent every 12 hours  metoprolol tartrate 12.5 milliGRAM(s) Oral every 12 hours  midodrine. 10 milliGRAM(s) Oral three times a day  pantoprazole    Tablet 40 milliGRAM(s) Oral before breakfast  polyethylene glycol 3350 17 Gram(s) Oral daily  senna 2 Tablet(s) Oral at bedtime  sucralfate 1 Gram(s) Oral every 12 hours    PRN Inpatient Medications  traMADol 25 milliGRAM(s) Oral every 6 hours PRN        VITALS/PHYSICAL EXAM  --------------------------------------------------------------------------------  T(C): 37.7 (08-19-24 @ 07:54), Max: 37.7 (08-19-24 @ 07:54)  HR: 56 (08-19-24 @ 07:54) (56 - 80)  BP: 104/62 (08-19-24 @ 07:54) (104/62 - 113/66)  RR: 18 (08-19-24 @ 07:54) (18 - 19)  SpO2: 91% (08-19-24 @ 00:04) (91% - 94%)  Wt(kg): --        08-18-24 @ 07:01  -  08-19-24 @ 07:00  --------------------------------------------------------  IN: 0 mL / OUT: 1500 mL / NET: -1500 mL      Physical Exam:  	Gen: NAD  	Pulm: decrease BS  B/L  	CV:  S1S2; no rub  	Abd: +ndistended  	Vascular access:    LABS/STUDIES  --------------------------------------------------------------------------------              8.2    5.01  >-----------<  114      [08-18-24 @ 08:17]              26.6     134  |  100  |  57  ----------------------------<  136      [08-18-24 @ 08:17]  4.0   |  22  |  3.2        Ca     7.3     [08-18-24 @ 08:17]      Mg     2.0     [08-18-24 @ 08:17]      Creatinine Trend:  SCr 3.2 [08-18 @ 08:17]  SCr 2.8 [08-17 @ 09:28]  SCr 2.2 [08-16 @ 09:28]  SCr 2.5 [08-15 @ 05:53]  SCr 2.2 [08-14 @ 12:02]    Urinalysis - [08-18-24 @ 08:17]      Color  / Appearance  / SG  / pH       Gluc 136 / Ketone   / Bili  / Urobili        Blood  / Protein  / Leuk Est  / Nitrite       RBC  / WBC  / Hyaline  / Gran  / Sq Epi  / Non Sq Epi  / Bacteria       Iron 34, TIBC 150, %sat 23      [08-15-24 @ 10:36]  Ferritin 675      [08-15-24 @ 10:36]  TSH 4.36      [08-08-24 @ 09:40]    HBsAb Nonreact      [08-11-24 @ 11:39]  HBsAg Nonreact      [08-11-24 @ 11:39]  HBcAb Nonreact      [08-11-24 @ 11:39]

## 2024-08-19 NOTE — CHART NOTE - NSCHARTNOTEFT_GEN_A_CORE
Registered Dietitian Follow-Up     Patient Profile Reviewed                           Yes [x]   No []     Nutrition History Previously Obtained        Yes [x]  No []       Pertinent Subjective Information: PO intake noted to be good from 8/15-, pt consuming %.      Pertinent Medical Interventions:  # Worsening metabolic encephalopathy /Acute kidney inury on CKD stage 4/ rhabdomyolysis / Hyperammonemia   - mental status improved now, no indications for  brain MRI  - pt started on HD ( last HD on 8/15),  nephrology is following, discussed with nephrology today who advised right femoral U dall to be removed- i removed the [place], discussed with RN  #soft + bite sized consistency and thin liquids per SP 8/15      Diet order: Diet, Soft and Bite Sized (- @ 10:22) [Active]    Anthropometrics:  Ht: 170.2cm  wt: 101kg  BMI: 34.9  IBW: 61.3kg     Daily Weight in k (08-15), Weight in k (), Weight in k ()    MEDICATIONS  (STANDING):  buMETAnide 1 milliGRAM(s) Oral every 12 hours  chlorhexidine 2% Cloths 1 Application(s) Topical daily  epoetin raquel (EPOGEN) Injectable 77844 Unit(s) SubCutaneous every 7 days  folic acid 1 milliGRAM(s) Oral daily  heparin   Injectable 5000 Unit(s) SubCutaneous every 12 hours  lactulose Syrup 20 Gram(s) Oral every 6 hours  lidocaine 1%/epinephrine 1:100,000 Inj 2 Vial(s) Local Injection once  lidocaine 1%/epinephrine 1:100,000 Inj 3 Vial(s) Local Injection once  meropenem  IVPB 1000 milliGRAM(s) IV Intermittent every 12 hours  metoprolol tartrate 12.5 milliGRAM(s) Oral every 12 hours  midodrine. 10 milliGRAM(s) Oral three times a day  pantoprazole    Tablet 40 milliGRAM(s) Oral before breakfast  polyethylene glycol 3350 17 Gram(s) Oral daily  senna 2 Tablet(s) Oral at bedtime  sucralfate 1 Gram(s) Oral every 12 hours    MEDICATIONS  (PRN):  traMADol 25 milliGRAM(s) Oral every 6 hours PRN Severe Pain (7 - 10)    Pertinent Labs:  @ 11:33: Na 137, BUN 66<HH>, Cr 3.2<H>, BG 91, K+ 4.2, Phos 4.4, Mg 2.1, Alk Phos 85, ALT/SGPT 9, AST/SGOT 18, HbA1c --    Physical Findings:  - Appearance: confused, disoriented to time   - GI function:   - Tubes: none  - Oral/Mouth cavity: per SLP   - Skin: ecchymosis   - Edema:  left foot; right foot     Nutrition Requirements: adjusted as no PI + on HD   Weight Used: 101kg vs. IBW: 61.3kg      Estimated Energy Needs    Continue [x]  Adjust []  1830-2135kcal/day (using 30-35kcal/kg due to HD and obese BMI)      Estimated Protein Needs    Continue [x]  Adjust []  73-92g/day (using 1.2-1.5g/kg due to large diff btwn ABW/IBW vs. HD)      Estimated Fluid Needs        Continue [x]  Adjust []  1mL/kcal/day     Nutrient Intake: >75%     [] Previous Nutrition Diagnosis: Increased Nutrient Needs            [x] Ongoing          [] Resolved     Nutrition Education: differed     Goal/Expected Outcome: Pt to consume and tolerate >75% of meals/snacks and PO supplements in 5-7 days.      Nutrition Monitoring:diet order,energy intake,body composition,NFPF, lytes, GI 9BM), BG    Recommendation:  1. add renal restrictions   2. cont w/ SLP recc diet order   3. encourage and assist with PO intake     mod risk f/u 5-7 days

## 2024-08-19 NOTE — PROGRESS NOTE ADULT - ATTENDING COMMENTS
Present with the resident during the interview and examination of the patient by me. I personally interviewed the patient and repeated the exam. I reviewed/revised the exam and discussed with the resident in regards to assessment and plan as documented. See resident's section for details and agree with the above documented note.    S-Patient states that he feels well currently. denies any complaints.     O-PEx: Cardiac exam is regular rate and rhythm, lungs are clear to auscultation.  AAOx3 spheres, complaint of sacral pain.     I have utilized all available resources to obtain, update, or review the patients current medications.     Results of imaging, labs and EKG reviewed independently    Medical Problems:     # metabolic encephalopathy /Acute kidney inury on CKD stage 4/ rhabdomyolysis / Hyperammonemia   - mental status improved now, no indications for  brain MRI  - pt started on HD ( last HD on 8/15),  nephrology is following, discussed with nephrology today who advised right femoral U dall to be removed-[place] removed   - c/w Bumex 1 mg Q 12 hours ( recommended by nephrology)     # Bilateral buttocks and sacrum deep Tissue Injury/  bacteuria with ESBL Enterobacter with culver catheter  - DECREASE Meropenem 500 q12    # Acute on chronic  anemia/ History of AVMs (gastric and duodenal) and Dieulafoy Lesions Status Post Hemostasis in 2023/ History of perisplenic Varices in 2023   # Chronic diastolic CHF/ Severe aortic stenosis - follow up with Dr. Hernandez as an outpatient for TAVR  - c/w metoprolol, c/w  Midodrine 10 q8H  - HD as scheduled     Disposition and Medical Necessity :   -STR 2-3 MN, pending re-consult of Burn team, and adjustment of IV AB to lower dose due to EVAN, pending final recs from Renal on HD     I have seen and examined the patient today and I spend time with the patient half of the time face-to-face encounter, I examine the patient, reviewed medications, I have reviewed laboratories, and imaging, and discussed plan of care with nurses staff. Please excuse any dictation or typographical errors that have not been edited out

## 2024-08-19 NOTE — PROGRESS NOTE ADULT - ASSESSMENT
· Assessment	  78-year-old female with past medical history of   CKD, HTN, CCY, gout,  severe AS being planned for tAVR, and history of severe anemia secondary to chronic upper GI bleeding due to  AV malformation gastric body dieulafoy lesion, weekly blood transfusions presenting to ER for a rupture pressure ulcer of the buttock area today.   Patient states that last night was on her toilet which cracked while she was sitting on it, and patient was unable to get off the toilet for 4 hours.  By the time EMS arrived skin had ulcerated, with a blister that ruptured predominantly to the left buttock area (appears to be like burn blister that ruptured) with linear abrasions to the bilateral upper thighs/buttocks.    IMPRESSION/RECOMMENDATIONS  Immunosuppression/Immunosenescence ( above age 60 yrs there is a exponential decline in immunity which could result in poor clinical outcomes.  Sacrum : pressure ulcer with necrosis/ localized abscess/cellulitis  8/14 s/p debridement by Burn  Asymptomatic bacteuria with ESBL Enterobacter with culver catheter  No pyuria  No pyelonephritis  EVAN  No PNA  8/12 Doppler ; no DVT    -Off loading to prevent pressure sores and preventive measures to avoid aspiration  -bedside PT/Rehab. Out of bed to chair if possible.   -Meropenem 1 gm iv q12h> could change to po Cipro 250 mg q12h and po Flagyl 500 mg q12h for 10 days if no further debridement planned

## 2024-08-19 NOTE — PROGRESS NOTE ADULT - SUBJECTIVE AND OBJECTIVE BOX
LATRICIA ARREAGA  78y, Female    All available historical data reviewed    OVERNIGHT EVENTS:  feels well and has no new complaints  No fevers   On RA    ROS:  General: Denies rigors, nightsweats  HEENT: Denies headache, rhinorrhea, sore throat, eye pain  CV: Denies CP, palpitations  PULM: Denies wheezing, hemoptysis  GI: Denies hematemesis, hematochezia, melena  : Denies discharge, hematuria  MSK: Denies arthralgias, myalgias  SKIN: Denies rash, lesions  NEURO: Denies paresthesias, weakness  PSYCH: Denies depression, anxiety    VITALS:  T(F): 99.8, Max: 99.8 (08-19-24 @ 07:54)  HR: 56  BP: 104/62  RR: 18Vital Signs Last 24 Hrs  T(C): 37.7 (19 Aug 2024 07:54), Max: 37.7 (19 Aug 2024 07:54)  T(F): 99.8 (19 Aug 2024 07:54), Max: 99.8 (19 Aug 2024 07:54)  HR: 56 (19 Aug 2024 07:54) (56 - 80)  BP: 104/62 (19 Aug 2024 07:54) (104/62 - 113/66)  BP(mean): --  RR: 18 (19 Aug 2024 07:54) (18 - 19)  SpO2: 91% (19 Aug 2024 00:04) (91% - 94%)    Parameters below as of 19 Aug 2024 00:04  Patient On (Oxygen Delivery Method): room air        TESTS & MEASUREMENTS:                        8.3    5.01  )-----------( 135      ( 19 Aug 2024 11:33 )             26.1     08-19    137  |  100  |  66<HH>  ----------------------------<  91  4.2   |  24  |  3.2<H>    Ca    7.5<L>      19 Aug 2024 11:33  Phos  4.4     08-19  Mg     2.1     08-19    TPro  4.7<L>  /  Alb  2.4<L>  /  TBili  0.4  /  DBili  x   /  AST  18  /  ALT  9   /  AlkPhos  85  08-19    LIVER FUNCTIONS - ( 19 Aug 2024 11:33 )  Alb: 2.4 g/dL / Pro: 4.7 g/dL / ALK PHOS: 85 U/L / ALT: 9 U/L / AST: 18 U/L / GGT: x             Urinalysis Basic - ( 19 Aug 2024 11:33 )    Color: x / Appearance: x / SG: x / pH: x  Gluc: 91 mg/dL / Ketone: x  / Bili: x / Urobili: x   Blood: x / Protein: x / Nitrite: x   Leuk Esterase: x / RBC: x / WBC x   Sq Epi: x / Non Sq Epi: x / Bacteria: x          Social History:  Tobacco Use: No  Alcohol Use: No  Drug Use: No    RADIOLOGY & ADDITIONAL TESTS:  Personal review of radiological diagnostics performed  Echo and EKG results noted when applicable.     MEDICATIONS:  acetaminophen     Tablet .. 1000 milliGRAM(s) Oral every 8 hours  buMETAnide 1 milliGRAM(s) Oral every 12 hours  chlorhexidine 2% Cloths 1 Application(s) Topical daily  epoetin raquel (EPOGEN) Injectable 24233 Unit(s) SubCutaneous every 7 days  folic acid 1 milliGRAM(s) Oral daily  heparin   Injectable 5000 Unit(s) SubCutaneous every 12 hours  lactulose Syrup 20 Gram(s) Oral every 6 hours  lidocaine 1%/epinephrine 1:100,000 Inj 2 Vial(s) Local Injection once  lidocaine 1%/epinephrine 1:100,000 Inj 3 Vial(s) Local Injection once  meropenem  IVPB 1000 milliGRAM(s) IV Intermittent every 12 hours  metoprolol tartrate 12.5 milliGRAM(s) Oral every 12 hours  midodrine. 10 milliGRAM(s) Oral three times a day  pantoprazole    Tablet 40 milliGRAM(s) Oral before breakfast  polyethylene glycol 3350 17 Gram(s) Oral daily  senna 2 Tablet(s) Oral at bedtime  sucralfate 1 Gram(s) Oral every 12 hours  traMADol 25 milliGRAM(s) Oral every 6 hours PRN      ANTIBIOTICS:  meropenem  IVPB 1000 milliGRAM(s) IV Intermittent every 12 hours

## 2024-08-19 NOTE — PROGRESS NOTE ADULT - SUBJECTIVE AND OBJECTIVE BOX
24H events:    Patient is a 78y old Female who presents with a chief complaint of sacral wound (15 Aug 2024 08:25)    Primary diagnosis of Pressure ulcer      Day 1:  Day 2:  Day 3:     Today is hospital day 21d. This morning patient was seen and examined at bedside, resting comfortably in bed.    No acute or major events overnight.    Code Status:    Family communication:  Contact date:  Name of person contacted:  Relationship to patient:  Communication details:  What matters most:    PAST MEDICAL & SURGICAL HISTORY  HTN (hypertension)    Heart murmur    Eczema    Anemia  iron infusions and PRBC transfusions    Aortic stenosis    Chronic kidney disease (CKD)    2019 novel coronavirus disease (COVID-19)  2020- REHAB admission    Gastric AVM    H/O appendicitis    S/P cholecystectomy    History of esophagogastroduodenoscopy (EGD)    H/O colonoscopy    H/O  section    History of appendectomy    Port-A-Cath in place  right CW      SOCIAL HISTORY:  Social History:      ALLERGIES:  aspirin (Rash; Other)  penicillins (Rash; Urticaria; Hives; Blisters)  latex (Unknown)  EKG leads (Hives)    MEDICATIONS:  STANDING MEDICATIONS  acetaminophen     Tablet .. 1000 milliGRAM(s) Oral every 8 hours  buMETAnide 1 milliGRAM(s) Oral every 12 hours  chlorhexidine 2% Cloths 1 Application(s) Topical daily  epoetin raquel (EPOGEN) Injectable 74267 Unit(s) SubCutaneous every 7 days  folic acid 1 milliGRAM(s) Oral daily  heparin   Injectable 5000 Unit(s) SubCutaneous every 12 hours  lactulose Syrup 20 Gram(s) Oral every 6 hours  lidocaine 1%/epinephrine 1:100,000 Inj 3 Vial(s) Local Injection once  lidocaine 1%/epinephrine 1:100,000 Inj 2 Vial(s) Local Injection once  meropenem  IVPB 1000 milliGRAM(s) IV Intermittent every 12 hours  metoprolol tartrate 12.5 milliGRAM(s) Oral every 12 hours  midodrine. 10 milliGRAM(s) Oral three times a day  pantoprazole    Tablet 40 milliGRAM(s) Oral before breakfast  polyethylene glycol 3350 17 Gram(s) Oral daily  senna 2 Tablet(s) Oral at bedtime  sucralfate 1 Gram(s) Oral every 12 hours    PRN MEDICATIONS  traMADol 25 milliGRAM(s) Oral every 6 hours PRN    VITALS:   T(F): 99.8  HR: 56  BP: 104/62  RR: 18  SpO2: 91%    PHYSICAL EXAM:  GENERAL:   ( x) NAD, lying in bed comfortably     (  ) obtunded     (  ) lethargic     (  ) somnolent      NECK:  (x) Supple     (  ) neck stiffness     (  ) nuchal rigidity     (  )  no JVD     (  ) JVD present ( -- cm)    HEART:  Rate -->     (x) normal rate     (  ) bradycardic     (  ) tachycardic  Rhythm -->     (x) regular     (  ) regularly irregular     (  ) irregularly irregular  Murmurs -->     (x) normal s1s2     (  ) systolic murmur     (  ) diastolic murmur     (  ) continuous murmur      (  ) S3 present     (  ) S4 present    LUNGS:   ( x)Unlabored respirations     (  ) tachypnea  ( x) B/L air entry     (  ) decreased breath sounds in:  (location     )    ( x) no adventitious sound     (  ) crackles     (  ) wheezing      (  ) rhonchi      (specify location:       )  (  ) chest wall tenderness (specify location:       )    ABDOMEN:   ( x) Soft     (  ) tense   |   (  ) nondistended     (  ) distended   |   (  ) +BS     (  ) hypoactive bowel sounds     (  ) hyperactive bowel sounds  ( x) nontender     (  ) RUQ tenderness     (  ) RLQ tenderness     (  ) LLQ tenderness     (  ) epigastric tenderness     (  ) diffuse tenderness  (  ) Splenomegaly      (  ) Hepatomegaly      (  ) Jaundice     (  ) ecchymosis     EXTREMITIES:  ( x) Normal     (  ) Rash     (  ) ecchymosis     (  ) varicose veins      (  ) pitting edema     (  ) non-pitting edema   (  ) ulceration     (  ) gangrene:     (location:     )    NERVOUS SYSTEM:    ( x) A&Ox3     (  ) confused     (  ) lethargic  CN II-XII:     ( x) Intact     (  ) deficits found     (Specify:     )   Upper extremities:     (  ) no sensorimotor deficits     (  ) weakness     (  ) loss of proprioception/vibration     (  ) loss of touch/temperature (specify:    )  Lower extremities:     (  ) no sensorimotor deficits     (  ) weakness     (  ) loss of proprioception/vibration     (  ) loss of touch/temperature (specify:    )    SKIN:   (  ) No rashes or lesions     (  ) maculopapular rash     (  ) pustules     (  ) vesicles     (  ) ulcer     (  ) ecchymosis     (specify location:     )      LABS:                        8.3    5.01  )-----------( 135      ( 19 Aug 2024 11:33 )             26.1         137  |  100  |  66<HH>  ----------------------------<  91  4.2   |  24  |  3.2<H>    Ca    7.5<L>      19 Aug 2024 11:33  Phos  4.4       Mg     2.1         TPro  4.7<L>  /  Alb  2.4<L>  /  TBili  0.4  /  DBili  x   /  AST  18  /  ALT  9   /  AlkPhos  85        Urinalysis Basic - ( 19 Aug 2024 11:33 )    Color: x / Appearance: x / SG: x / pH: x  Gluc: 91 mg/dL / Ketone: x  / Bili: x / Urobili: x   Blood: x / Protein: x / Nitrite: x   Leuk Esterase: x / RBC: x / WBC x   Sq Epi: x / Non Sq Epi: x / Bacteria: x                RADIOLOGY:

## 2024-08-19 NOTE — PROGRESS NOTE ADULT - ASSESSMENT
78-year-old female with past medical history of   CKD, HTN, CCY, gout,  severe AS being planned for TAVR, and history of severe anemia secondary to chronic upper GI bleeding due to  AV malformation gastric body dieulafoy lesion, weekly blood transfusions presenting to ER for a rupture pressure ulcer of the buttock area.  #EVAN on CKD4/ anemia   #AMS  - creat up / udall d/c if cr continues to increase will resume hd in am   - non oliguric   - no hydro on renal/bladder sono   - last phos noted; monitor  - BP noted; continue  midodrine 10mg q8h    - cont WOODY wkly, Tsat low/ no venofer on atb   - check EKG/ troponin   -  on atb followed by ID / decrease meropenem to 500 q 12   - overall prognosis poor   will follow

## 2024-08-19 NOTE — PROGRESS NOTE ADULT - ASSESSMENT
78-year-old female with past medical history of   CKD, HTN, CCY, gout,  severe AS being planned for tAVR, and history of severe anemia secondary to chronic upper GI bleeding due to  AV malformation gastric body dieulafoy lesion, weekly blood transfusions presenting to ER for a rupture pressure ulcer of the buttock area. Hospital course complicated by worsening encephalopathy s/p urgent HD, upgraded to SDU    A/P   # Worsening metabolic encephalopathy /Acute kidney inury on CKD stage 4/ rhabdomyolysis / Hyperammonemia   - mental status improved now, no indications for  brain MRI  - pt started on HD ( last HD on 8/15),  nephrology is following, discussed with nephrology today who advised right femoral U dall to be removed-[place] removed   - CTAP done and reported-  No evidence of an intraperitoneal or retroperitoneal hematoma; CT evidence of anemia as above. Moderate rectal stool burden with associated rectal wall thickening and adjacent edema. Findings may reflect developing stercoral proctitis.  - patient started on Lactulose--> now having BM, goal 2-3 BM daily   - c/w Lokelma   - renal diet  - no hydro on renal/bladder sono   - c/w Bumex 1 mg Q 12 hours ( recommended by nephrology)   - if cr continues to increase will resume hd in am   - non oliguric   - supportive care     # Bilateral buttocks and sacrum deep Tissue Injury/  bacteuria with ESBL Enterobacter with culver catheter  - c/w wound care per burn, s/p debridement on 8/14  - off load pressure points, change position Q 2 hours   - Fu Burn   - C/w Meropenam 500 q12    # Acute on chronic  anemia/ History of AVMs (gastric and duodenal) and Dieulafoy Lesions Status Post Hemostasis in 2023/ History of perisplenic Varices in 2023   - s/p EGD/colonoscopy/push enteroscopy on 7/22 showing AVMs and polyps  - s/p 1U pRBC on 8/2, s/p course of Venofer  - on weekly IV transfusion/iron infusions per hematology  - monitor h/h, keep active T&S  - keep Hb above 7.0     # Chronic diastolic CHF/ Severe aortic stenosis   -  TTE Echo Complete w/o Contrast w/ Doppler (08.01.24 @ 11:03) >EF of 56 %. Mild asymmetric left ventricular hypertrophy. Grade II diastolic dysfunction). Severe aortic valve stenosis , Mild to moderate mitral valve regurgitation. Moderate aortic regurgitation.  - Patient to follow up with Dr. Hernandez as an outpatient for TAVR  - c/w metoprolol, c/w  Midodrine 10 q8H  - HD as scheduled       #Progress Note Handoff  Pending (specify): monitor H/H,  Meropenem, monitor BUN/cr and urine output, PT/rehab evaluation        78-year-old female with past medical history of   CKD, HTN, CCY, gout,  severe AS being planned for tAVR, and history of severe anemia secondary to chronic upper GI bleeding due to  AV malformation gastric body dieulafoy lesion, weekly blood transfusions presenting to ER for a rupture pressure ulcer of the buttock area. Hospital course complicated by worsening encephalopathy s/p urgent HD, upgraded to SDU    A/P   # Worsening metabolic encephalopathy /Acute kidney inury on CKD stage 4/ rhabdomyolysis / Hyperammonemia   - mental status improved now, no indications for  brain MRI  - pt started on HD ( last HD on 8/15),  nephrology is following, discussed with nephrology today who advised right femoral U dall to be removed-[place] removed   - CTAP done and reported-  No evidence of an intraperitoneal or retroperitoneal hematoma; CT evidence of anemia as above. Moderate rectal stool burden with associated rectal wall thickening and adjacent edema. Findings may reflect developing stercoral proctitis.  - patient started on Lactulose--> now having BM, goal 2-3 BM daily   - c/w Lokelma   - renal diet  - no hydro on renal/bladder sono   - c/w Bumex 1 mg Q 12 hours ( recommended by nephrology)   - if cr continues to increase will resume hd in am   - non oliguric   - supportive care     # Bilateral buttocks pressure wounds:    - 8/13 s/p bedside debridement of bilateral buttocks by Dr Schwarz   - continue local wound care bid: wash wounds with soap and water, apply hydrogel, cover with Vashe moist gauze, then apply ABD and tape.  - pain management  - offloading, position changes    - c/w wound care per burn, s/p debridement on 8/14  - off load pressure points, change position Q 2 hours   - Fu Burn   - C/w Meropenam 500 q12    # Acute on chronic  anemia/ History of AVMs (gastric and duodenal) and Dieulafoy Lesions Status Post Hemostasis in 2023/ History of perisplenic Varices in 2023   - s/p EGD/colonoscopy/push enteroscopy on 7/22 showing AVMs and polyps  - s/p 1U pRBC on 8/2, s/p course of Venofer  - on weekly IV transfusion/iron infusions per hematology  - monitor h/h, keep active T&S  - keep Hb above 7.0     # Chronic diastolic CHF/ Severe aortic stenosis   -  TTE Echo Complete w/o Contrast w/ Doppler (08.01.24 @ 11:03) >EF of 56 %. Mild asymmetric left ventricular hypertrophy. Grade II diastolic dysfunction). Severe aortic valve stenosis , Mild to moderate mitral valve regurgitation. Moderate aortic regurgitation.  - Patient to follow up with Dr. Hernandez as an outpatient for TAVR  - c/w metoprolol, c/w  Midodrine 10 q8H  - HD as scheduled       #Progress Note Handoff  Pending (specify): monitor H/H,  Meropenem, monitor BUN/cr and urine output, PT/rehab evaluation

## 2024-08-20 LAB
ALBUMIN SERPL ELPH-MCNC: 2.3 G/DL — LOW (ref 3.5–5.2)
ALP SERPL-CCNC: 83 U/L — SIGNIFICANT CHANGE UP (ref 30–115)
ALT FLD-CCNC: 9 U/L — SIGNIFICANT CHANGE UP (ref 0–41)
ANION GAP SERPL CALC-SCNC: 12 MMOL/L — SIGNIFICANT CHANGE UP (ref 7–14)
APTT BLD: 34.7 SEC — SIGNIFICANT CHANGE UP (ref 27–39.2)
AST SERPL-CCNC: 17 U/L — SIGNIFICANT CHANGE UP (ref 0–41)
BASOPHILS # BLD AUTO: 0.01 K/UL — SIGNIFICANT CHANGE UP (ref 0–0.2)
BASOPHILS # BLD AUTO: 0.03 K/UL — SIGNIFICANT CHANGE UP (ref 0–0.2)
BASOPHILS NFR BLD AUTO: 0.2 % — SIGNIFICANT CHANGE UP (ref 0–1)
BASOPHILS NFR BLD AUTO: 0.4 % — SIGNIFICANT CHANGE UP (ref 0–1)
BILIRUB SERPL-MCNC: 0.5 MG/DL — SIGNIFICANT CHANGE UP (ref 0.2–1.2)
BUN SERPL-MCNC: 66 MG/DL — CRITICAL HIGH (ref 10–20)
CALCIUM SERPL-MCNC: 7.9 MG/DL — LOW (ref 8.4–10.5)
CHLORIDE SERPL-SCNC: 103 MMOL/L — SIGNIFICANT CHANGE UP (ref 98–110)
CO2 SERPL-SCNC: 24 MMOL/L — SIGNIFICANT CHANGE UP (ref 17–32)
CREAT SERPL-MCNC: 3 MG/DL — HIGH (ref 0.7–1.5)
EGFR: 15 ML/MIN/1.73M2 — LOW
EOSINOPHIL # BLD AUTO: 0.56 K/UL — SIGNIFICANT CHANGE UP (ref 0–0.7)
EOSINOPHIL # BLD AUTO: 0.73 K/UL — HIGH (ref 0–0.7)
EOSINOPHIL NFR BLD AUTO: 10.2 % — HIGH (ref 0–8)
EOSINOPHIL NFR BLD AUTO: 9.6 % — HIGH (ref 0–8)
GLUCOSE SERPL-MCNC: 83 MG/DL — SIGNIFICANT CHANGE UP (ref 70–99)
HCT VFR BLD CALC: 26.4 % — LOW (ref 37–47)
HCT VFR BLD CALC: 28.3 % — LOW (ref 37–47)
HGB BLD-MCNC: 8.1 G/DL — LOW (ref 12–16)
HGB BLD-MCNC: 8.6 G/DL — LOW (ref 12–16)
IMM GRANULOCYTES NFR BLD AUTO: 0.6 % — HIGH (ref 0.1–0.3)
IMM GRANULOCYTES NFR BLD AUTO: 0.7 % — HIGH (ref 0.1–0.3)
INR BLD: 0.96 RATIO — SIGNIFICANT CHANGE UP (ref 0.65–1.3)
LYMPHOCYTES # BLD AUTO: 0.99 K/UL — LOW (ref 1.2–3.4)
LYMPHOCYTES # BLD AUTO: 1.14 K/UL — LOW (ref 1.2–3.4)
LYMPHOCYTES # BLD AUTO: 15.9 % — LOW (ref 20.5–51.1)
LYMPHOCYTES # BLD AUTO: 17 % — LOW (ref 20.5–51.1)
MAGNESIUM SERPL-MCNC: 2.1 MG/DL — SIGNIFICANT CHANGE UP (ref 1.8–2.4)
MCHC RBC-ENTMCNC: 30.3 PG — SIGNIFICANT CHANGE UP (ref 27–31)
MCHC RBC-ENTMCNC: 30.4 G/DL — LOW (ref 32–37)
MCHC RBC-ENTMCNC: 30.4 PG — SIGNIFICANT CHANGE UP (ref 27–31)
MCHC RBC-ENTMCNC: 30.7 G/DL — LOW (ref 32–37)
MCV RBC AUTO: 100 FL — HIGH (ref 81–99)
MCV RBC AUTO: 98.9 FL — SIGNIFICANT CHANGE UP (ref 81–99)
MONOCYTES # BLD AUTO: 0.74 K/UL — HIGH (ref 0.1–0.6)
MONOCYTES # BLD AUTO: 0.98 K/UL — HIGH (ref 0.1–0.6)
MONOCYTES NFR BLD AUTO: 12.7 % — HIGH (ref 1.7–9.3)
MONOCYTES NFR BLD AUTO: 13.6 % — HIGH (ref 1.7–9.3)
NEUTROPHILS # BLD AUTO: 3.5 K/UL — SIGNIFICANT CHANGE UP (ref 1.4–6.5)
NEUTROPHILS # BLD AUTO: 4.26 K/UL — SIGNIFICANT CHANGE UP (ref 1.4–6.5)
NEUTROPHILS NFR BLD AUTO: 59.3 % — SIGNIFICANT CHANGE UP (ref 42.2–75.2)
NEUTROPHILS NFR BLD AUTO: 59.8 % — SIGNIFICANT CHANGE UP (ref 42.2–75.2)
NRBC # BLD: 0 /100 WBCS — SIGNIFICANT CHANGE UP (ref 0–0)
NRBC # BLD: 0 /100 WBCS — SIGNIFICANT CHANGE UP (ref 0–0)
PHOSPHATE SERPL-MCNC: 4.7 MG/DL — SIGNIFICANT CHANGE UP (ref 2.1–4.9)
PLATELET # BLD AUTO: 152 K/UL — SIGNIFICANT CHANGE UP (ref 130–400)
PLATELET # BLD AUTO: 181 K/UL — SIGNIFICANT CHANGE UP (ref 130–400)
PMV BLD: 10.1 FL — SIGNIFICANT CHANGE UP (ref 7.4–10.4)
PMV BLD: 10.1 FL — SIGNIFICANT CHANGE UP (ref 7.4–10.4)
POTASSIUM SERPL-MCNC: 4.5 MMOL/L — SIGNIFICANT CHANGE UP (ref 3.5–5)
POTASSIUM SERPL-SCNC: 4.5 MMOL/L — SIGNIFICANT CHANGE UP (ref 3.5–5)
PROT SERPL-MCNC: 4.9 G/DL — LOW (ref 6–8)
PROTHROM AB SERPL-ACNC: 10.9 SEC — SIGNIFICANT CHANGE UP (ref 9.95–12.87)
RBC # BLD: 2.67 M/UL — LOW (ref 4.2–5.4)
RBC # BLD: 2.83 M/UL — LOW (ref 4.2–5.4)
RBC # FLD: 18.8 % — HIGH (ref 11.5–14.5)
RBC # FLD: 19 % — HIGH (ref 11.5–14.5)
SODIUM SERPL-SCNC: 139 MMOL/L — SIGNIFICANT CHANGE UP (ref 135–146)
WBC # BLD: 5.84 K/UL — SIGNIFICANT CHANGE UP (ref 4.8–10.8)
WBC # BLD: 7.18 K/UL — SIGNIFICANT CHANGE UP (ref 4.8–10.8)
WBC # FLD AUTO: 5.84 K/UL — SIGNIFICANT CHANGE UP (ref 4.8–10.8)
WBC # FLD AUTO: 7.18 K/UL — SIGNIFICANT CHANGE UP (ref 4.8–10.8)

## 2024-08-20 PROCEDURE — 99233 SBSQ HOSP IP/OBS HIGH 50: CPT

## 2024-08-20 PROCEDURE — 99222 1ST HOSP IP/OBS MODERATE 55: CPT

## 2024-08-20 PROCEDURE — 71045 X-RAY EXAM CHEST 1 VIEW: CPT | Mod: 26

## 2024-08-20 RX ORDER — MEROPENEM 500 MG/10ML
1000 INJECTION, POWDER, FOR SOLUTION INTRAVENOUS EVERY 12 HOURS
Refills: 0 | Status: DISCONTINUED | OUTPATIENT
Start: 2024-08-20 | End: 2024-08-22

## 2024-08-20 RX ADMIN — Medication 20 GRAM(S): at 06:32

## 2024-08-20 RX ADMIN — Medication 20 GRAM(S): at 17:06

## 2024-08-20 RX ADMIN — Medication 40 MILLIGRAM(S): at 06:33

## 2024-08-20 RX ADMIN — MEROPENEM 100 MILLIGRAM(S): 500 INJECTION, POWDER, FOR SOLUTION INTRAVENOUS at 17:03

## 2024-08-20 RX ADMIN — Medication 20 GRAM(S): at 23:53

## 2024-08-20 RX ADMIN — CHLORHEXIDINE GLUCONATE 1 APPLICATION(S): 40 SOLUTION TOPICAL at 10:54

## 2024-08-20 RX ADMIN — TRAMADOL HYDROCHLORIDE 25 MILLIGRAM(S): 200 TABLET, EXTENDED RELEASE ORAL at 10:52

## 2024-08-20 RX ADMIN — Medication 2 TABLET(S): at 00:05

## 2024-08-20 RX ADMIN — SUCRALFATE 1 GRAM(S): 1 SUSPENSION ORAL at 06:33

## 2024-08-20 RX ADMIN — Medication 5000 UNIT(S): at 17:06

## 2024-08-20 RX ADMIN — ACETAMINOPHEN 1000 MILLIGRAM(S): 325 TABLET ORAL at 00:05

## 2024-08-20 RX ADMIN — MIDODRINE HYDROCHLORIDE 10 MILLIGRAM(S): 5 TABLET ORAL at 06:39

## 2024-08-20 RX ADMIN — POLYETHYLENE GLYCOL 3350 17 GRAM(S): 17 POWDER, FOR SOLUTION ORAL at 10:54

## 2024-08-20 RX ADMIN — TRAMADOL HYDROCHLORIDE 25 MILLIGRAM(S): 200 TABLET, EXTENDED RELEASE ORAL at 06:32

## 2024-08-20 RX ADMIN — SUCRALFATE 1 GRAM(S): 1 SUSPENSION ORAL at 17:02

## 2024-08-20 RX ADMIN — Medication 5000 UNIT(S): at 06:33

## 2024-08-20 RX ADMIN — ACETAMINOPHEN 1000 MILLIGRAM(S): 325 TABLET ORAL at 22:30

## 2024-08-20 RX ADMIN — TRAMADOL HYDROCHLORIDE 25 MILLIGRAM(S): 200 TABLET, EXTENDED RELEASE ORAL at 11:30

## 2024-08-20 RX ADMIN — METOPROLOL TARTRATE 12.5 MILLIGRAM(S): 100 TABLET ORAL at 17:02

## 2024-08-20 RX ADMIN — MIDODRINE HYDROCHLORIDE 10 MILLIGRAM(S): 5 TABLET ORAL at 10:52

## 2024-08-20 RX ADMIN — Medication 20 GRAM(S): at 10:54

## 2024-08-20 RX ADMIN — BUMETANIDE 1 MILLIGRAM(S): 2 TABLET ORAL at 17:02

## 2024-08-20 RX ADMIN — Medication 2 TABLET(S): at 21:43

## 2024-08-20 RX ADMIN — METOPROLOL TARTRATE 12.5 MILLIGRAM(S): 100 TABLET ORAL at 06:33

## 2024-08-20 RX ADMIN — MEROPENEM 100 MILLIGRAM(S): 500 INJECTION, POWDER, FOR SOLUTION INTRAVENOUS at 06:31

## 2024-08-20 RX ADMIN — ACETAMINOPHEN 1000 MILLIGRAM(S): 325 TABLET ORAL at 21:42

## 2024-08-20 RX ADMIN — Medication 1 MILLIGRAM(S): at 10:52

## 2024-08-20 RX ADMIN — ACETAMINOPHEN 1000 MILLIGRAM(S): 325 TABLET ORAL at 06:33

## 2024-08-20 RX ADMIN — MIDODRINE HYDROCHLORIDE 10 MILLIGRAM(S): 5 TABLET ORAL at 17:02

## 2024-08-20 RX ADMIN — BUMETANIDE 1 MILLIGRAM(S): 2 TABLET ORAL at 06:33

## 2024-08-20 NOTE — PROGRESS NOTE ADULT - SUBJECTIVE AND OBJECTIVE BOX
24H events:    Patient is a 78y old Female who presents with a chief complaint of sacral wound (15 Aug 2024 08:25)    Primary diagnosis of Pressure ulcer      Day 1:  Day 2:  Day 3:     Today is hospital day 22d. This morning patient was seen and examined at bedside, resting comfortably in bed.    No acute or major events overnight.    Code Status:    Family communication:  Contact date:  Name of person contacted:  Relationship to patient:  Communication details:  What matters most:    PAST MEDICAL & SURGICAL HISTORY  HTN (hypertension)    Heart murmur    Eczema    Anemia  iron infusions and PRBC transfusions    Aortic stenosis    Chronic kidney disease (CKD)    2019 novel coronavirus disease (COVID-19)  2020- REHAB admission    Gastric AVM    H/O appendicitis    S/P cholecystectomy    History of esophagogastroduodenoscopy (EGD)    H/O colonoscopy    H/O  section    History of appendectomy    Port-A-Cath in place  right CW      SOCIAL HISTORY:  Social History:      ALLERGIES:  aspirin (Rash; Other)  penicillins (Rash; Urticaria; Hives; Blisters)  latex (Unknown)  EKG leads (Hives)    MEDICATIONS:  STANDING MEDICATIONS  acetaminophen     Tablet .. 1000 milliGRAM(s) Oral every 8 hours  buMETAnide 1 milliGRAM(s) Oral every 12 hours  chlorhexidine 2% Cloths 1 Application(s) Topical daily  epoetin raquel (EPOGEN) Injectable 99703 Unit(s) SubCutaneous every 7 days  folic acid 1 milliGRAM(s) Oral daily  heparin   Injectable 5000 Unit(s) SubCutaneous every 12 hours  lactulose Syrup 20 Gram(s) Oral every 6 hours  lidocaine 1%/epinephrine 1:100,000 Inj 2 Vial(s) Local Injection once  lidocaine 1%/epinephrine 1:100,000 Inj 3 Vial(s) Local Injection once  meropenem  IVPB 1000 milliGRAM(s) IV Intermittent every 12 hours  metoprolol tartrate 12.5 milliGRAM(s) Oral every 12 hours  midodrine. 10 milliGRAM(s) Oral three times a day  pantoprazole    Tablet 40 milliGRAM(s) Oral before breakfast  polyethylene glycol 3350 17 Gram(s) Oral daily  senna 2 Tablet(s) Oral at bedtime  sucralfate 1 Gram(s) Oral every 12 hours    PRN MEDICATIONS  traMADol 25 milliGRAM(s) Oral every 6 hours PRN    VITALS:   T(F): 98.3  HR: 67  BP: 102/40  RR: 18  SpO2: 93%    PHYSICAL EXAM:  GENERAL:   ( x) NAD, lying in bed comfortably     (  ) obtunded     (  ) lethargic     (  ) somnolent      NECK:  (x) Supple     (  ) neck stiffness     (  ) nuchal rigidity     (  )  no JVD     (  ) JVD present ( -- cm)    HEART:  Rate -->     (x) normal rate     (  ) bradycardic     (  ) tachycardic  Rhythm -->     (x) regular     (  ) regularly irregular     (  ) irregularly irregular  Murmurs -->     (x) normal s1s2     (  ) systolic murmur     (  ) diastolic murmur     (  ) continuous murmur      (  ) S3 present     (  ) S4 present    LUNGS:   ( x)Unlabored respirations     (  ) tachypnea  ( x) B/L air entry     (  ) decreased breath sounds in:  (location     )    ( x) no adventitious sound     (  ) crackles     (  ) wheezing      (  ) rhonchi      (specify location:       )  (  ) chest wall tenderness (specify location:       )    ABDOMEN:   ( x) Soft     (  ) tense   |   (  ) nondistended     (  ) distended   |   (  ) +BS     (  ) hypoactive bowel sounds     (  ) hyperactive bowel sounds  ( x) nontender     (  ) RUQ tenderness     (  ) RLQ tenderness     (  ) LLQ tenderness     (  ) epigastric tenderness     (  ) diffuse tenderness  (  ) Splenomegaly      (  ) Hepatomegaly      (  ) Jaundice     (  ) ecchymosis     EXTREMITIES:  ( x) Normal     (  ) Rash     (  ) ecchymosis     (  ) varicose veins      (  ) pitting edema     (  ) non-pitting edema   (  ) ulceration     (  ) gangrene:     (location:     )    NERVOUS SYSTEM:    ( x) A&Ox3     (  ) confused     (  ) lethargic  CN II-XII:     ( x) Intact     (  ) deficits found     (Specify:     )   Upper extremities:     (  ) no sensorimotor deficits     (  ) weakness     (  ) loss of proprioception/vibration     (  ) loss of touch/temperature (specify:    )  Lower extremities:     (  ) no sensorimotor deficits     (  ) weakness     (  ) loss of proprioception/vibration     (  ) loss of touch/temperature (specify:    )    SKIN:   (  ) No rashes or lesions     (  ) maculopapular rash     (  ) pustules     (  ) vesicles     (  ) ulcer     (  ) ecchymosis     (specify location:     )    LABS:                        8.1    5.84  )-----------( 152      ( 20 Aug 2024 07:40 )             26.4     08-20    139  |  103  |  66<HH>  ----------------------------<  83  4.5   |  24  |  3.0<H>    Ca    7.9<L>      20 Aug 2024 07:40  Phos  4.7     08-  Mg     2.1     08-20    TPro  4.9<L>  /  Alb  2.3<L>  /  TBili  0.5  /  DBili  x   /  AST  17  /  ALT  9   /  AlkPhos  83  08-20      Urinalysis Basic - ( 20 Aug 2024 07:40 )    Color: x / Appearance: x / SG: x / pH: x  Gluc: 83 mg/dL / Ketone: x  / Bili: x / Urobili: x   Blood: x / Protein: x / Nitrite: x   Leuk Esterase: x / RBC: x / WBC x   Sq Epi: x / Non Sq Epi: x / Bacteria: x                RADIOLOGY:

## 2024-08-20 NOTE — PROGRESS NOTE ADULT - ASSESSMENT
78-year-old female with past medical history of   CKD, HTN, CCY, gout,  severe AS being planned for tAVR, and history of severe anemia secondary to chronic upper GI bleeding due to  AV malformation gastric body dieulafoy lesion, weekly blood transfusions presenting to ER for a rupture pressure ulcer of the buttock area. Hospital course complicated by worsening encephalopathy s/p urgent HD, upgraded to SDU    A/P   # Worsening metabolic encephalopathy /Acute kidney inury on CKD stage 4/ rhabdomyolysis / Hyperammonemia   - mental status improved now, no indications for  brain MRI  - pt started on HD ( last HD on 8/15),  nephrology is following, discussed with nephrology today who advised right femoral U dall to be removed-[place] removed   - CTAP done and reported-  No evidence of an intraperitoneal or retroperitoneal hematoma; CT evidence of anemia as above. Moderate rectal stool burden with associated rectal wall thickening and adjacent edema. Findings may reflect developing stercoral proctitis.  - patient started on Lactulose--> now having BM, goal 2-3 BM daily   - c/w Lokelma   - renal diet  - no hydro on renal/bladder sono   - c/w Bumex 1 mg Q 12 hours ( recommended by nephrology)   - if cr continues to increase will resume hd in am   - non oliguric   - supportive care     # Bilateral buttocks pressure wounds:    - 8/13 s/p bedside debridement of bilateral buttocks by Dr Schwarz   - continue local wound care bid: wash wounds with soap and water, apply hydrogel, cover with Vashe moist gauze, then apply ABD and tape.  - pain management  - offloading, position changes  -Planned for debridment in OR with Dr Schwarz in AM. NPO MN. 8pm preop labs, cardio clearance     - c/w wound care per burn, s/p debridement on 8/14  - off load pressure points, change position Q 2 hours   - Fu Burn   - C/w Meropenam 500 q12    # Acute on chronic  anemia/ History of AVMs (gastric and duodenal) and Dieulafoy Lesions Status Post Hemostasis in 2023/ History of perisplenic Varices in 2023   - s/p EGD/colonoscopy/push enteroscopy on 7/22 showing AVMs and polyps  - s/p 1U pRBC on 8/2, s/p course of Venofer  - on weekly IV transfusion/iron infusions per hematology  - monitor h/h, keep active T&S  - keep Hb above 7.0     # Chronic diastolic CHF/ Severe aortic stenosis   -  TTE Echo Complete w/o Contrast w/ Doppler (08.01.24 @ 11:03) >EF of 56 %. Mild asymmetric left ventricular hypertrophy. Grade II diastolic dysfunction). Severe aortic valve stenosis , Mild to moderate mitral valve regurgitation. Moderate aortic regurgitation.  - Patient to follow up with Dr. Hernandez as an outpatient for TAVR  - c/w metoprolol, c/w  Midodrine 10 q8H  - HD as scheduled       #Progress Note Handoff  Pending (specify): Debridement with burn in AM

## 2024-08-20 NOTE — PROGRESS NOTE ADULT - SUBJECTIVE AND OBJECTIVE BOX
Burn recalled to follow up on buttock pressure wounds s/p being stuck on toilet    AM rounds       Vital Signs Last 24 Hrs  T(C): 36.8 (20 Aug 2024 07:45), Max: 36.9 (19 Aug 2024 15:22)  T(F): 98.3 (20 Aug 2024 07:45), Max: 98.4 (19 Aug 2024 15:22)  HR: 67 (20 Aug 2024 07:45) (67 - 71)  BP: 102/40 (20 Aug 2024 07:45) (102/40 - 118/64)  BP(mean): --  RR: 18 (20 Aug 2024 07:45) (18 - 18)  SpO2: 93% (20 Aug 2024 00:23) (93% - 96%)    Parameters below as of 19 Aug 2024 15:22  Patient On (Oxygen Delivery Method): room air                              8.1    5.84  )-----------( 152      ( 20 Aug 2024 07:40 )             26.4             EXAM:   GENERAL: seen on bedside, alert, NAD, laying in bed comfortably, cooperative  SKIN/Wound: bilateral buttocks full thickness and partial thickness wounds, s/p debridement of bilateral buttock wounds,  Left buttock wound base pink, no pus drainage, no bleeding noted, + serosanguineous discharge;   Right buttock wound base pale with areas of grayish/brown non healthy tissue, no pus drainage, no bleeding noted.  Dressing done, pt tolerated well.

## 2024-08-20 NOTE — PROGRESS NOTE ADULT - ASSESSMENT
78-year-old female with past medical history of   CKD, HTN, CCY, gout,  severe AS being planned for TAVR, and history of severe anemia secondary to chronic upper GI bleeding due to  AV malformation gastric body dieulafoy lesion, weekly blood transfusions presenting to ER for a rupture pressure ulcer of the buttock area.    #EVAN on CKD4/ anemia   #AMS    - creat noted at baseline from 4/2024 stable at 3's  - no HD for now / on Bumex cont   - non oliguric   - no hydro on renal/bladder sono   - last phos noted; monitor  - BP noted; continue  midodrine 10mg q8h    - cont WOODY wkly, Tsat low/ no venofer on atb   -  on atb followed by ID /can do merrem 1 g q12h   - overall prognosis poor   will follow

## 2024-08-20 NOTE — CONSULT NOTE ADULT - ATTENDING COMMENTS
Likely acute metabolic encephalopathy from uremia.  No biochemical or imaging evidence of cirrhosis. Avoid lactulose.  Manual disimpaction and aggressive bowel regimen with enemas and oral bowel regimen.  Monitor hemoglobin and transfusion as needed for history of AVMs.  If overt GI bleeding please alert GI.
events noted, not following commands, CT head/ ammonia/ GI/ renal/ Neuro reviewed/ plan as above, GOC, poor prognosis
79 yo woman with hx of CKD 4 p/w creat somewhat above pt's baseline though no baseline is clearly established as it is very variable likely d/t CHF with severe aortic stenosis and changes in volume status.  There has been no improvement in renal function with volume expansion and pt has peripheral edema.  Would d/c iv fluids and maintain on home dose diuretics.  BUN will be elevated on steroids.  Avoid hypotension. Monitor urine output.  Will follow up
Patient seen and examined   78-year-old female with past medical history of CKD, HTN, CCY, gout,  severe AS being planned for possible TAVR, and history of severe anemia secondary to chronic upper GI bleeding due to  AV malformation gastric body dieulafoy lesion, weekly blood transfusions who presented to the ER for a ruptured pressure ulcer of the buttock area. Had complicated hospital course with a few sessions of dialysis for AMS presumed to be secondary to EVAN on CKD- last HD on 8/15.  Had bedside debridement by burn, but as wounds are so deep, they are planning for OR debridement.    Currently patient is relatively stable from cardiac standpoint with no evidence of acute heart failure decompensation, malignant arrhythmia or ACS  Given severe AS patient is at increased cardiac risk for low risk procedure however given the need for procedure and low risk , no contraindication from cardiac standpoint to proceed with procedure, no need for further cardiac workup or intervention prior to the intervention at this point    Try to keep patient euvolemic  monitor kidney function and electrolytes    other plan as outlined above
77 yo F w/ MMP including worsening CKD, HTN, gout, severe AS, and recent anemia admitted to the hospital for a rupture pressure ulcer with fluctuating mental status, increasing lethargy and generalized weakness without any focal neurologic deficits suspect toxic/metabolic encephalopathy possible uremic encephalopathy with worsening BUN.  Recommendations as above.

## 2024-08-20 NOTE — CONSULT NOTE ADULT - ASSESSMENT
78-year-old female with past medical history of CKD, HTN, CCY, gout,  severe AS being planned for TAVR, and history of severe anemia secondary to chronic upper GI bleeding due to  AV malformation gastric body dieulafoy lesion, weekly blood transfusions who presented to the ER for a ruptured pressure ulcer of the buttock area. Had complicated hospital course with a few sessions of dialysis for AMS presumed to be secondary to EVAN on CKD- last HD on 8/15.  Had bedside debridement by burn, but as wounds are so deep, they are planning for OR debridement.  Pt is lying in bed. Has no dyspnea, palpitations, chest pain. No orthopnea. No smoking, drugs, marijuana. IVC 1.4-1.6. Appears euvolemic. Cardiology consulted for risk stratification for burn OR debridement tomorrow of buttock pressure ulcer. Has severe AS and planned to follow outpatient for TAVR. Difficulty ambulating due to severe pain. Currently, on metoprolol tartrate 12.5 BID, bumex 1 BID po and midodrine 10 Q8.    #Severe AS- planned for outpatient TAVR  #Cardiac risk stratification for buttock ulcer debridement  -High Risk for Low risk procedure  -RCRI Class 3 risk- 10.1% 30-day risk of death, MI or cardiac arrest    THIS IS AN INCOMPLETE NOTE PENDING DISCUSSION WITH THE ATTENDING       78-year-old female with past medical history of CKD, HTN, CCY, gout,  severe AS being planned for TAVR, and history of severe anemia secondary to chronic upper GI bleeding due to  AV malformation gastric body dieulafoy lesion, weekly blood transfusions who presented to the ER for a ruptured pressure ulcer of the buttock area. Had complicated hospital course with a few sessions of dialysis for AMS presumed to be secondary to EVAN on CKD- last HD on 8/15.  Had bedside debridement by burn, but as wounds are so deep, they are planning for OR debridement.  Pt is lying in bed. Has no dyspnea, palpitations, chest pain. No orthopnea. No smoking, drugs, marijuana. IVC 1.4-1.6. Appears euvolemic. Cardiology consulted for risk stratification for burn OR debridement tomorrow of buttock pressure ulcer. Has severe AS and planned to follow outpatient for TAVR. Difficulty ambulating due to severe pain. Currently, on metoprolol tartrate 12.5 BID, bumex 1 BID po and midodrine 10 Q8.    #Severe AS- planned for outpatient TAVR  #Cardiac risk stratification for buttock ulcer debridement  -Elevated Risk for Low risk procedure  -RCRI Class 3 risk- 10.1% 30-day risk of death, MI or cardiac arrest         78-year-old female with past medical history of CKD, HTN, CCY, gout,  severe AS being planned for TAVR, and history of severe anemia secondary to chronic upper GI bleeding due to  AV malformation gastric body dieulafoy lesion, weekly blood transfusions who presented to the ER for a ruptured pressure ulcer of the buttock area. Had complicated hospital course with a few sessions of dialysis for AMS presumed to be secondary to EVAN on CKD- last HD on 8/15.  Had bedside debridement by burn, but as wounds are so deep, they are planning for OR debridement.  Pt is lying in bed. Has no dyspnea, palpitations, chest pain. No orthopnea. No smoking, drugs, marijuana. IVC 1.4-1.6. Appears euvolemic. Cardiology consulted for risk stratification for burn OR debridement tomorrow of buttock pressure ulcer. Has severe AS and planned to follow outpatient for TAVR. Difficulty ambulating due to severe pain. Currently, on metoprolol tartrate 12.5 BID, bumex 1 BID po and midodrine 10 Q8.    #Severe AS- planned for outpatient TAVR  #Cardiac risk stratification for buttock ulcer debridement  -increased  Risk for Low risk procedure  -RCRI Class 3 risk- 10.1% 30-day risk of death, MI or cardiac arrest

## 2024-08-20 NOTE — CONSULT NOTE ADULT - SUBJECTIVE AND OBJECTIVE BOX
Discharge Summary - Marck Shaffer 67 y o  female MRN: [id number]  Unit/Bed#: -01 Encounter: [id number]    Admission Date: 2/9/2023     Discharge Date: 2/23/23    Rehab Diagnosis: Impairment of mobility, safety and Activities of Daily Living (ADLs) due to Orthopedic Disorders:  08 2  Femur (Shaft) Fracture    HPI: Per Rodol Marker:  Liliya Ricketts is a 67year old female with a history of hypothyroid, osteoporosis, scoliosis, and hyperlipidemia that presented to [address] [place] [address] on 2/2/23 with complaints of right hip pain after feeling a "pop" in her right hip when descending stairs  She lowered herself to the floor and crawled to phone to call EMS  Exam revealed RLE shortening and external rotation  Right femur x-ray showed acute transverse substantially displaced proximal femoral shaft fracture  Orthopedics was consulted, and patient was recommended for NWB to RLE with Mane's traction, with plans for surgical intervention  On 2/3, patient underwent right insertion IM nailing with Dr Hortencia Spann ; EBL minimal  Postoperatively, she was cleared for WBAT to RLE, and initiated on Lovenox SQ for DVT prophylaxis for 28 days (through 3/3)  Patient takes bisphosphonate for greater than 10 years for osteoporosis  Per Orthopedics, "patient should not continue bisphosphonate use at this time given radiographic changes to left femur including lateral cortical thickening without obvious stress fracture/beaking  She needs to follow up immediately with orthopedics if she develops ambulatory pain in left thigh"  Patient developed dizziness and diaphoresis with ambulation, requiring IV fluid boluses  Orthostatic blood pressures were negative  On 2/5, RRT was called due to patient being lowered to floor due to dizziness  CTA of head/neck was obtained, which showed no acute findings   However, it did show outpouching of right PCA, and Neurovascular follow-up is to be completed outpatient  ECHO showed an EF of 65%, mild TVR and trace PVR  On 2/8, patient was transfused 1 unit PRBCs for Hgb of 7 3 given ongoing lightheadedness  Hgb improved to 9 4 on 2/9, with patient's symptoms overall improved  Patient is overall hemodynamically stable and medically cleared for discharge to [facility]  PT/OT therapies were consulted, as well as patient's case reviewed with [facility] physician, and they are recommending patient for inpatient Acute Rehab  She has demonstrated that she can tolerate and participate in 3 hours of therapy per day  Acute Rehabilitation Center Course: Patient participated in a comprehensive interdisciplinary inpatient rehabilitation program which included involvment of MD, therapies (PT, OT, and/or SLP), RN, CM, SW, dietary, and psychology services  She was able to be advanced to a modified independent level of assist with rolling walker and considered safe for discharge home  Please see below for day of discharge diet  Patient will continue her rehabilitative course with [facility]  Patient was instructed NO DRIVING  Patient also instructed on Lovenox injections for home  Her friend and neighbor will be assisting her post discharge as needed         Physical Therapy Occupational Therapy Speech Therapy   Weight Bearing Status: Weight Bearing as Tolerated  Transfers: Independent  Bed Mobility: Independent  Amulation Distance (ft): 450 feet  Ambulation: Independent  Assistive Device for Ambulation: Roller Walker  Wheelchair Mobility Distance:  (N/A)  Number of Stairs: 12  Assistive Device for Stairs: Single [employer] Assistance: Supervision  Ramp:  (not required for entry to home)  Discharge Recommendations: Home with:  [address] with[de-identified] Home Physical Therapy, Family Support, First Floor Setup   Eating: Independent  Grooming: Independent  Bathing: Independent  Bathing: Independent  Upper Body Dressing: Independent  Lower Body Dressing: Independent  Toileting: Independent  Tub/Shower Transfer: Independent  Toilet Transfer: Independent  Cognition: Within Defined Limits  Orientation: Person, Place, Time, Situation                 Medical Issues Addressed While at [facility]:      Incidental findings: The following findings require follow up:  Radiographic finding    1  CTA head and neck w wo contrast: CT brain: No acute intracranial abnormality   Suspected small old lacunar infarcts within the basal ganglia , CT angiography: There is a 2 5 x 2 0 cm focal outpouching at the expected location of the posterior communicating artery on the right   There is no vessel extending from this abnormality, suspicious for small aneurysm   Alternatively an infundibulum , could have this appearance   Recommend follow-up consultation with the [facility] Center, a division of [address] [place] [address] Neuroscience at [phone] ,     2  Incidental thyroid nodule(s) for which nonemergent thyroid ultrasound is recommended     Please let your primary physician know about those and ensure appropriate follow up               Follow up required: Neurovascular Clinic, Radiology (outpatient ultrasound) and PCP for thyroid follow-up              Follow up should be done within 8 week(s)  - Referrals made for Neurology and Neurosurgery and PCP     DVT prophylaxis  • Lovenox     Bladder plan  • Continent     Bowel plan  • Continent    * Femur fracture ([facility] )  Assessment & Plan  - Pt felt "pop" when descending stairs with inability to ambulate  -X-ray: acute transverse substantially displaced proximal femoral shaft fracture  -2/3/23 right IMN by Dr Esdras Anderson  -WBAT right lower extremity  - Lovenox for 28 days (3/3)  S/p PRBC x 1 unit    Plan at [facility]:  -Continue WBAT RLE  - Patient developed swelling at right lateral knee evaluated by orthopedics - x-ray completed - per ortho distal incision with hematoma; ace wrap daily and continue therapies as tolerated  -Monitor incision for signs of HPI:  HPI : Patient is a 78-year-old female with past medical history of   CKD, HTN, CCY, gout,  severe AS being planned for TAVR, and history of severe anemia secondary to chronic upper GI bleeding due to  AV malformation gastric body dieulafoy lesion, weekly blood transfusions presenting to ER for a rupture pressure ulcer of the buttock area today.   Patient states that last night was on her toilet which cracked while she was sitting on it, and patient was unable to get off the toilet for 4 hours.  By the time EMS arrived skin had ulcerated, with a blister that ruptured predominantly to the left buttock area (appears to be like burn blister that ruptured) with linear abrasions to the bilateral upper thighs/buttocks.  Patient in so much pain that she cannot stand or walk, Of note, pt was recently admitted to the hospital between - for hematochezia, s/p 1 unit prbc. Denies fever, chills. N/V, abd pain.     Vital Signs Last 24 Hrs  T(C): 36.4 (2024 00:51), Max: 37.1 (2024 18:30)  T(F): 97.6 (2024 00:51), Max: 98.7 (2024 18:30)  HR: 65 (2024 00:51) (59 - 65)  BP: 146/69 (2024 00:51) (112/46 - 146/69)  BP(mean): --  RR: 18 (2024 00:51) (18 - 18)  SpO2: 99% (2024 00:51) (95% - 99%)    Parameters below as of 2024 00:51  Patient On (Oxygen Delivery Method): room air    Labs significant for wbc 11k , hgb 9.1 , Cr 3.5 (baseline 2-3)   s/p  1L NS in ED   Admitted for wound management and placement as pt can not care for her own ADLs. (2024 03:46)      Cardiology Consult HPI:  Pt is lying in bed- appears comfortable. Has no dyspnea, palpitations, chest pain. No orthopnea. No smoking drugs, marijuana.     PAST MEDICAL & SURGICAL HISTORY  HTN (hypertension)    Heart murmur    Eczema    Anemia  iron infusions and PRBC transfusions    Aortic stenosis    Chronic kidney disease (CKD)     novel coronavirus disease (COVID-19)  2020- REHAB admission    Gastric AVM    H/O appendicitis    S/P cholecystectomy    History of esophagogastroduodenoscopy (EGD)    H/O colonoscopy    H/O  section    History of appendectomy    Port-A-Cath in place  right CW        FAMILY HISTORY:  FAMILY HISTORY:  FH: kidney disease (Father)    FH: breast cancer (Mother)    FH: multiple myeloma (Mother)      [ ] no pertinent family history of premature cardiovascular disease in first degree relatives.  Mother:   Father:   Siblings:     SOCIAL HISTORY:  []smoker  []Alcohol  []Drug    ALLERGIES:  aspirin (Rash; Other)  penicillins (Rash; Urticaria; Hives; Blisters)  latex (Unknown)  EKG leads (Hives)      MEDICATIONS:  MEDICATIONS  (STANDING):  acetaminophen     Tablet .. 1000 milliGRAM(s) Oral every 8 hours  buMETAnide 1 milliGRAM(s) Oral every 12 hours  chlorhexidine 2% Cloths 1 Application(s) Topical daily  epoetin raquel (EPOGEN) Injectable 92095 Unit(s) SubCutaneous every 7 days  folic acid 1 milliGRAM(s) Oral daily  heparin   Injectable 5000 Unit(s) SubCutaneous every 12 hours  lactulose Syrup 20 Gram(s) Oral every 6 hours  lidocaine 1%/epinephrine 1:100,000 Inj 2 Vial(s) Local Injection once  lidocaine 1%/epinephrine 1:100,000 Inj 3 Vial(s) Local Injection once  meropenem  IVPB 1000 milliGRAM(s) IV Intermittent every 12 hours  metoprolol tartrate 12.5 milliGRAM(s) Oral every 12 hours  midodrine. 10 milliGRAM(s) Oral three times a day  pantoprazole    Tablet 40 milliGRAM(s) Oral before breakfast  polyethylene glycol 3350 17 Gram(s) Oral daily  senna 2 Tablet(s) Oral at bedtime  sucralfate 1 Gram(s) Oral every 12 hours    MEDICATIONS  (PRN):  traMADol 25 milliGRAM(s) Oral every 6 hours PRN Severe Pain (7 - 10)      HOME MEDICATIONS:  Home Medications:  furosemide 40 mg oral tablet: 1 tab(s) orally once a day (2024 04:41)  metoprolol tartrate 25 mg oral tablet: 1 tab(s) orally once a day (2024 04:41)      VITALS:   T(F): 98.3 (08-20 @ 07:45), Max: 99.8 ( @ 07:54)  HR: 67 ( @ 07:45) (56 - 80)  BP: 102/40 ( @ 07:45) (93/55 - 118/64)  BP(mean): --  RR: 18 ( @ 07:45) (16 - 19)  SpO2: 93% ( @ 00:23) (91% - 99%)    I&O's Summary    19 Aug 2024 07:01  -  20 Aug 2024 07:00  --------------------------------------------------------  IN: 610 mL / OUT: 1500 mL / NET: -890 mL        REVIEW OF SYSTEMS:    Negative except as mentioned in HPI    CONSTITUTIONAL: No weakness, fevers or chills  EYES: No visual changes  ENT: No vertigo or throat pain   NECK: No pain or stiffness  RESPIRATORY: No cough, wheezing, hemoptysis; No shortness of breath  CARDIOVASCULAR: No chest pain or palpitations  GASTROINTESTINAL: No abdominal or epigastric pain. No nausea, vomiting, or hematemesis; No diarrhea or constipation. No melena or hematochezia.  GENITOURINARY: No dysuria, frequency or hematuria  NEUROLOGICAL: No numbness or weakness  SKIN: No itching, no rashes  MSK: FROM x4    PHYSICAL EXAM:  NEURO: Awake , alert and oriented  GEN: Not in acute distress  NECK: No JVD  LUNGS: Clear to auscultation bilaterally, trace crackles  CARDIOVASCULAR: Harsh systolic murmur of AS present, S2 not appreciated, RRR , IVC 1.4-1.6   ABD: Soft, non-tender, non-distended, +BS  EXT: trace ededma  SKIN: has bilateral buttock wounds    LABS:                        8.1    5.84  )-----------( 152      ( 20 Aug 2024 07:40 )             26.4     08-20    139  |  103  |  66<HH>  ----------------------------<  83  4.5   |  24  |  3.0<H>    Ca    7.9<L>      20 Aug 2024 07:40  Phos  4.7     08-20  Mg     2.1     08-20    TPro  4.9<L>  /  Alb  2.3<L>  /  TBili  0.5  /  DBili  x   /  AST  17  /  ALT  9   /  AlkPhos  83  08-20              Troponin trend: 108>142>125          RADIOLOGY:  -CXR: clear costophrenic angles, wet read  -TTE: 24- EF 56%, G2DD, normal RV, severe AS, mod AR, mild to mod MR, mild TR  -CCTA: None  -STRESS TEST: None  -CATHETERIZATION: None    ECG: NSR w LAD and LVH    TELEMETRY EVENTS: NA   infection -incision very stable  -Ortho removed staples, 02/16/23  -Continue acute rehabilitation program  -Follow-up with Orthopedic Surgery (Dr Claire Ryan) in 4 weeks (3/23)  For home: Continue Lovenox injections until 03/03, [facility] outpatient PT in home, follow-up with Dr Claire Ryan ~03/23    Nail abnormalities  Assessment & Plan  -podiatry consult for nail abnormalities  - trimmed   - follow-up with o/p podiatry PRN    Osteoporosis  Assessment & Plan  -took bisphosphonate <10 years  -per Ortho: pt should stop bisphosphonate at this time d/t left femur lateral cortical thickening without obvious stress fracture/break  -needs immediate f/u if develops pain in left leg  -follow-up with Orthopedic  -follow-up with endocrinologist    HLD (hyperlipidemia)  Assessment & Plan  -  -home: Crestor 10 mg  -continue Pravachol 20 mg while in [facility]    Acute blood loss anemia  Assessment & Plan  In acute care post op dizziness, diaphoresis, lowered to floor  -IVF  -Hbg 7 3, received 1 unit pRBC    Here at [facility]:  -current Hbg 9 9 (2/16)  -monitor hemoglobin  -No further diaphoretic episodes  -for home:  Follow-up with PCP in 1 week  to obtain order for CBC to monitor hemoglobin and continued treatment    Hypothyroidism  Assessment & Plan  -continue levothyroxine 25 mcg daily    Acute pain due to trauma  Assessment & Plan  -acute post-op pain right hip/leg  -tylenol 975 mg scheduled, oxycodone IR 2 5-5 mg prn (pt not taking), Lidoderm patch   -monitor pain with therapy  -adjust medication as needed  -for home: continue with tylenol 975mg q8h prn, Lidoderm patch on 12 hours then off 12 hours, cold applications, elevation    Previous CVA  CT brain: No acute intracranial abnormality   Suspected small old lacunar infarcts within the basal ganglia , CT angiography: There is a 2 5 x 2 0 cm focal outpouching at the expected location of the posterior communicating artery on the right   There is no vessel extending from this abnormality, suspicious for small aneurysm   Alternatively an infundibulum , could have this appearance  You have been started on Aspirin 81 mg daily to begin on 3/4/23  Follow-up with Neurology    Probable Aneurysm:    CT brain: No acute intracranial abnormality   Suspected small old lacunar infarcts within the basal ganglia , CT angiography: There is a 2 5 x 2 0 cm focal outpouching at the expected location of the posterior communicating artery on the right   There is no vessel extending from this abnormality, suspicious for small aneurysm   Alternatively an infundibulum , could have this appearance  Follow-up with Neurosurgery    Lightheadedness-resolved as of 2/21/2023  Assessment & Plan  -post-op with ambulation  - orthostatic BP normal  -Hbg 7 3 (2/7)  -received IVF, 1 unit PRBC in acute care    No further episodes in acute rehabilitation        Discharge Physical Examination:  Physical Exam  Vitals and nursing note reviewed  Constitutional:       General: She is not in acute distress  HENT:      Head: Normocephalic and atraumatic  Nose: Nose normal       Mouth/Throat:      Mouth: Mucous membranes are moist    Eyes:      Conjunctiva/sclera: Conjunctivae normal    Cardiovascular:      Rate and Rhythm: Normal rate and regular rhythm  Pulses: Normal pulses  Pulmonary:      Effort: Pulmonary effort is normal       Breath sounds: Normal breath sounds  No wheezing or rales  Abdominal:      General: Bowel sounds are normal  There is no distension  Palpations: Abdomen is soft  Tenderness: There is no abdominal tenderness  Musculoskeletal:         General: No swelling  Cervical back: Neck supple  Skin:     General: Skin is warm  Comments: Incision healing well   Knee lateral swelling improving, bruising improving   Neurological:      Mental Status: She is alert and oriented to person, place, and time  Sensory: No sensory deficit        Comments: Antalgic gait (mild)  Stability is good with RW  Strength in leg now graded as 4-/5 proximally and 5/5 distally   Psychiatric:         Mood and Affect: Mood normal        Discharge Medications:   See after visit summary for reconciled discharge medications provided to patient and family  Condition at Discharge: stable     Discharge instructions/Information to patient and family:   See after visit summary for information provided to patient and family  Provisions for Follow-Up Care:  See after visit summary for information related to follow-up care and any pertinent home health orders  Future Appointments   Date Time Provider Loly Borden   4/12/2023  2:00 PM MD Harvinder Gutierrez [facility]       Disposition: Home      Planned Readmission: No    Discharge Statement   I spent more than 30 minutes discharging the patient  This time was spent on the day of discharge  I had direct contact with the patient on the day of discharge  Greater than 50% of the total time was spent examining patient, answering all patient questions, arranging and discussing plan of care with patient as well as directly providing post-discharge instructions  Additional time then spent on discharge activities  Discharge Medications:  See after visit summary for reconciled discharge medications provided to patient and family  HPI:  HPI : Patient is a 78-year-old female with past medical history of   CKD, HTN, CCY, gout,  severe AS being planned for TAVR, and history of severe anemia secondary to chronic upper GI bleeding due to  AV malformation gastric body dieulafoy lesion, weekly blood transfusions presenting to ER for a rupture pressure ulcer of the buttock area today.   Patient states that last night was on her toilet which cracked while she was sitting on it, and patient was unable to get off the toilet for 4 hours.  By the time EMS arrived skin had ulcerated, with a blister that ruptured predominantly to the left buttock area (appears to be like burn blister that ruptured) with linear abrasions to the bilateral upper thighs/buttocks.  Patient in so much pain that she cannot stand or walk, Of note, pt was recently admitted to the hospital between - for hematochezia, s/p 1 unit prbc. Denies fever, chills. N/V, abd pain.     Vital Signs Last 24 Hrs  T(C): 36.4 (2024 00:51), Max: 37.1 (2024 18:30)  T(F): 97.6 (2024 00:51), Max: 98.7 (2024 18:30)  HR: 65 (2024 00:51) (59 - 65)  BP: 146/69 (2024 00:51) (112/46 - 146/69)  BP(mean): --  RR: 18 (2024 00:51) (18 - 18)  SpO2: 99% (2024 00:51) (95% - 99%)    Parameters below as of 2024 00:51  Patient On (Oxygen Delivery Method): room air    Labs significant for wbc 11k , hgb 9.1 , Cr 3.5 (baseline 2-3)   s/p  1L NS in ED   Admitted for wound management and placement as pt can not care for her own ADLs. (2024 03:46)      Cardiology Consult HPI:  Pt is lying in bed- appears comfortable. Has no dyspnea, palpitations, chest pain. No orthopnea. No smoking drugs, marijuana. No congestion, dysuria, NVDC, abdominal pain. Pt is not a great historian. Rest of Hx taken from chart.    PAST MEDICAL & SURGICAL HISTORY  HTN (hypertension)    Heart murmur    Eczema    Anemia  iron infusions and PRBC transfusions    Aortic stenosis    Chronic kidney disease (CKD)     novel coronavirus disease (COVID-19)  2020- REHAB admission    Gastric AVM    H/O appendicitis    S/P cholecystectomy    History of esophagogastroduodenoscopy (EGD)    H/O colonoscopy    H/O  section    History of appendectomy    Port-A-Cath in place  right CW        FAMILY HISTORY:  FAMILY HISTORY:  FH: kidney disease (Father)    FH: breast cancer (Mother)    FH: multiple myeloma (Mother)      [ ] no pertinent family history of premature cardiovascular disease in first degree relatives.  Mother:   Father:   Siblings:     SOCIAL HISTORY:  []smoker  []Alcohol  []Drug    ALLERGIES:  aspirin (Rash; Other)  penicillins (Rash; Urticaria; Hives; Blisters)  latex (Unknown)  EKG leads (Hives)      MEDICATIONS:  MEDICATIONS  (STANDING):  acetaminophen     Tablet .. 1000 milliGRAM(s) Oral every 8 hours  buMETAnide 1 milliGRAM(s) Oral every 12 hours  chlorhexidine 2% Cloths 1 Application(s) Topical daily  epoetin raquel (EPOGEN) Injectable 19074 Unit(s) SubCutaneous every 7 days  folic acid 1 milliGRAM(s) Oral daily  heparin   Injectable 5000 Unit(s) SubCutaneous every 12 hours  lactulose Syrup 20 Gram(s) Oral every 6 hours  lidocaine 1%/epinephrine 1:100,000 Inj 2 Vial(s) Local Injection once  lidocaine 1%/epinephrine 1:100,000 Inj 3 Vial(s) Local Injection once  meropenem  IVPB 1000 milliGRAM(s) IV Intermittent every 12 hours  metoprolol tartrate 12.5 milliGRAM(s) Oral every 12 hours  midodrine. 10 milliGRAM(s) Oral three times a day  pantoprazole    Tablet 40 milliGRAM(s) Oral before breakfast  polyethylene glycol 3350 17 Gram(s) Oral daily  senna 2 Tablet(s) Oral at bedtime  sucralfate 1 Gram(s) Oral every 12 hours    MEDICATIONS  (PRN):  traMADol 25 milliGRAM(s) Oral every 6 hours PRN Severe Pain (7 - 10)      HOME MEDICATIONS:  Home Medications:  furosemide 40 mg oral tablet: 1 tab(s) orally once a day (2024 04:41)  metoprolol tartrate 25 mg oral tablet: 1 tab(s) orally once a day (2024 04:41)      VITALS:   T(F): 98.3 ( @ 07:45), Max: 99.8 ( @ 07:54)  HR: 67 ( @ 07:45) (56 - 80)  BP: 102/40 ( @ 07:45) (93/55 - 118/64)  BP(mean): --  RR: 18 ( @ 07:45) (16 - 19)  SpO2: 93% ( @ 00:23) (91% - 99%)    I&O's Summary    19 Aug 2024 07:01  -  20 Aug 2024 07:00  --------------------------------------------------------  IN: 610 mL / OUT: 1500 mL / NET: -890 mL        REVIEW OF SYSTEMS:    Negative except as mentioned in HPI    CONSTITUTIONAL: No weakness, fevers or chills  EYES: No visual changes  ENT: No vertigo or throat pain   NECK: No pain or stiffness  RESPIRATORY: No cough, wheezing, hemoptysis; No shortness of breath  CARDIOVASCULAR: No chest pain or palpitations  GASTROINTESTINAL: No abdominal or epigastric pain. No nausea, vomiting, or hematemesis; No diarrhea or constipation. No melena or hematochezia.  GENITOURINARY: No dysuria, frequency or hematuria  NEUROLOGICAL: No numbness or weakness  SKIN: No itching, no rashes  MSK: FROM x4    PHYSICAL EXAM:  NEURO: Awake , alert and oriented  GEN: Not in acute distress  NECK: No JVD  LUNGS: Clear to auscultation bilaterally, trace crackles  CARDIOVASCULAR: Harsh systolic murmur of AS present, S2 not appreciated, RRR , IVC 1.4-1.6   ABD: Soft, non-tender, non-distended, +BS  EXT: trace ededma  SKIN: has bilateral buttock wounds    LABS:                        8.1    5.84  )-----------( 152      ( 20 Aug 2024 07:40 )             26.4     08-    139  |  103  |  66<HH>  ----------------------------<  83  4.5   |  24  |  3.0<H>    Ca    7.9<L>      20 Aug 2024 07:40  Phos  4.7     08-20  Mg     2.1     08-20    TPro  4.9<L>  /  Alb  2.3<L>  /  TBili  0.5  /  DBili  x   /  AST  17  /  ALT  9   /  AlkPhos  83  08-              Troponin trend: 108>142>125          RADIOLOGY:  -CXR: clear costophrenic angles, wet read  -TTE: 24- EF 56%, G2DD, normal RV, severe AS, mod AR, mild to mod MR, mild TR  -CCTA: None  -STRESS TEST: None  -CATHETERIZATION: None    ECG: NSR w LAD and LVH    TELEMETRY EVENTS: NA

## 2024-08-20 NOTE — CONSULT NOTE ADULT - CONSULT REQUESTED DATE/TIME
11-Aug-2024 08:17
12-Aug-2024 09:02
Who is calling:  Patient   Reason for Call:  Patient states she only received 60 tablets of Hydrocodone. Patient is requesting her additional 30 tablets be submitted to [facility].  Date of last appointment with primary care: 11/19/2018  Has the patient been recently seen:  Yes  Okay to leave a detailed message: Yes      
09-Aug-2024 08:27
08-Aug-2024 15:37
09-Aug-2024 11:54
11-Aug-2024
31-Jul-2024 13:38
30-Jul-2024 17:13
10-Aug-2024 13:54
20-Aug-2024 11:50
31-Jul-2024 11:29
08-Aug-2024 15:57
16-Aug-2024 12:39

## 2024-08-20 NOTE — PROGRESS NOTE ADULT - ASSESSMENT
78-year-old female with past medical history of   CKD, HTN, CCY, gout,  severe AS being planned for tAVR, and history of severe anemia secondary to chronic upper GI bleeding due to  AV malformation gastric body dieulafoy lesion, weekly blood transfusions presenting to ER for pressure ulcers of the buttock area.     # Bilateral buttocks pressure wounds:    - Wounds are still deep - Recommending further debridement in OR   - booked for OR tomorrow; please preop NPO after midnight, replete electrolytes as needed; active t&s  - 8/13 s/p bedside debridement of bilateral buttocks by Dr Schwarz   - continue local wound care bid: wash wounds with soap and water, apply hydrogel, cover with Vashe moist gauze, then apply ABD and tape.  - pain management  - offloading, position changes  - rest of care as per primary team    78-year-old female with past medical history of   CKD, HTN, CCY, gout,  severe AS being planned for tAVR, and history of severe anemia secondary to chronic upper GI bleeding due to  AV malformation gastric body dieulafoy lesion, weekly blood transfusions presenting to ER for pressure ulcers of the buttock area.     # Bilateral buttocks pressure wounds:    - Wounds are still deep - Recommending further debridement in OR   - booked for OR tomorrow; please preop NPO after midnight, replete electrolytes as needed; active t&s  - card c/s clearance   - 8/13 s/p bedside debridement of bilateral buttocks by Dr Schwarz   - continue local wound care bid: wash wounds with soap and water, apply hydrogel, cover with Vashe moist gauze, then apply ABD and tape.  - pain management  - offloading, position changes  - rest of care as per primary team

## 2024-08-20 NOTE — PROGRESS NOTE ADULT - ATTENDING COMMENTS
Attestation Statements:  I have personally seen and examined the patient.  I fully participated in the care of this patient.  I have made amendments to the documentation where necessary, and agree with the history, physical exam, and plan as documented by the Resident.     Present with the resident during the interview and examination of the patient by me. I personally interviewed the patient and repeated the exam. I reviewed/revised the exam and discussed with the resident in regards to assessment and plan as documented. See resident's section for details and agree with the above documented note.    S-Patient states that he feels well currently. denies any complaints.     O-PEx: Cardiac exam is regular rate and rhythm, lungs are clear to auscultation.  AAOx3 spheres, complaint of sacral pain.     I have utilized all available resources to obtain, update, or review the patients current medications.     Results of imaging, labs and EKG reviewed independently    Medical Problems:     # metabolic encephalopathy /Acute kidney inury on CKD stage 4/ rhabdomyolysis / Hyperammonemia: RESOLVING   - mental status improved now, no indications for  brain MRI  - pt started on HD ( last HD on 8/15),  nephrology is following, discussed with nephrology today who advised right femoral U dall to be removed-[place] removed   - c/w Bumex 1 mg Q 12 hours ( recommended by nephrology)     # Bilateral buttocks and sacrum deep Tissue Injury NOT IMPROVING   #bacteuria with ESBL Enterobacter with culver catheter:   - DECREASEd Meropenem 500 q12    # Acute on chronic  anemia/ History of AVMs (gastric and duodenal) and Dieulafoy Lesions Status Post Hemostasis in 2023/ History of perisplenic Varices in 2023   # Chronic diastolic CHF/ Severe aortic stenosis - Compensated   - follow up with Dr. Hernandez as an outpatient for TAVR  - c/w metoprolol, c/w  Midodrine 10 q8H  - HD as scheduled     Disposition and Medical Necessity :   -STR 2-3 MN, pending Burn team I&D,     Pre-op Assessment   -pt with CHF compensated: ----currently on Bumex, and prn HD, Give Albuterol prn if SOB estuardo and Intraoperatively.   -Pt with Severe Ao-Stenosis: High risk, ----Avoid Diuretics the day of procedure, continue metoprolol,   Encephalopathy: -----need limited use of Anesthesia, and Avoid using high dose, and avoid prolonged intubation    Considering 1PRBC considering CHF and h/o CAD the goal ---Risk of giving PRBC outweigh the benefit due to Sev. Ao Stenosis and CHF putting pt at risk for fluid overload.     Recs: Patient has a moderate to high risk for any procedure, going for a low risk procedure I&D of sacral ulcer.       I have seen and examined the patient today and I spend time with the patient half of the time face-to-face encounter, I examine the patient, reviewed medications, I have reviewed laboratories, and imaging, and discussed plan of care with nurses staff. Please excuse any dictation or typographical errors that have not been edited out.

## 2024-08-20 NOTE — PROGRESS NOTE ADULT - SUBJECTIVE AND OBJECTIVE BOX
Nephrology progress note    Patient is seen and examined, events over the last 24 h noted .  lying in bed comfortable     Allergies:  aspirin (Rash; Other)  penicillins (Rash; Urticaria; Hives; Blisters)  latex (Unknown)  EKG leads (Hives)    Hospital Medications:   MEDICATIONS  (STANDING):  acetaminophen     Tablet .. 1000 milliGRAM(s) Oral every 8 hours  buMETAnide 1 milliGRAM(s) Oral every 12 hours  epoetin raquel (EPOGEN) Injectable 47442 Unit(s) SubCutaneous every 7 days  folic acid 1 milliGRAM(s) Oral daily  heparin   Injectable 5000 Unit(s) SubCutaneous every 12 hours  lactulose Syrup 20 Gram(s) Oral every 6 hours  lidocaine 1%/epinephrine 1:100,000 Inj 2 Vial(s) Local Injection once  lidocaine 1%/epinephrine 1:100,000 Inj 3 Vial(s) Local Injection once  meropenem  IVPB 500 milliGRAM(s) IV Intermittent every 12 hours  metoprolol tartrate 12.5 milliGRAM(s) Oral every 12 hours  midodrine. 10 milliGRAM(s) Oral three times a day  pantoprazole    Tablet 40 milliGRAM(s) Oral before breakfast  polyethylene glycol 3350 17 Gram(s) Oral daily  senna 2 Tablet(s) Oral at bedtime  sucralfate 1 Gram(s) Oral every 12 hours        VITALS:  T(F): 98.3 (08-20-24 @ 07:45), Max: 98.4 (08-19-24 @ 15:22)  HR: 67 (08-20-24 @ 07:45)  BP: 102/40 (08-20-24 @ 07:45)  RR: 18 (08-20-24 @ 07:45)  SpO2: 93% (08-20-24 @ 00:23)    08-18 @ 07:01  -  08-19 @ 07:00  --------------------------------------------------------  IN: 0 mL / OUT: 1500 mL / NET: -1500 mL    08-19 @ 07:01  -  08-20 @ 07:00  --------------------------------------------------------  IN: 610 mL / OUT: 1500 mL / NET: -890 mL          PHYSICAL EXAM:  Constitutional: NAD  Respiratory: CTA  Cardiovascular: S1, S2, RRR  Gastrointestinal: BS+, soft, NT/ND  Extremities: No cyanosis or clubbing. plus one  peripheral edema  :  No culver.   Skin: No rashes    LABS:  08-20    139  |  103  |  66<HH>  ----------------------------<  83  4.5   |  24  |  3.0<H>    Ca    7.9<L>      20 Aug 2024 07:40  Phos  4.7     08-20  Mg     2.1     08-20    TPro  4.9<L>  /  Alb  2.3<L>  /  TBili  0.5  /  DBili      /  AST  17  /  ALT  9   /  AlkPhos  83  08-20                          8.1    5.84  )-----------( 152      ( 20 Aug 2024 07:40 )             26.4       Urine Studies:  Urinalysis Basic - ( 20 Aug 2024 07:40 )    Color:  / Appearance:  / SG:  / pH:   Gluc: 83 mg/dL / Ketone:   / Bili:  / Urobili:    Blood:  / Protein:  / Nitrite:    Leuk Esterase:  / RBC:  / WBC    Sq Epi:  / Non Sq Epi:  / Bacteria:           Iron 34, TIBC 150, %sat 23      [08-15-24 @ 10:36]  Ferritin 675      [08-15-24 @ 10:36]  TSH 4.36      [08-08-24 @ 09:40]    HBsAb Nonreact      [08-11-24 @ 11:39]  HBsAg Nonreact      [08-11-24 @ 11:39]  HBcAb Nonreact      [08-11-24 @ 11:39]    Free Light Chains: kappa 11.98, lambda 7.77, ratio = 1.54      [01-19 @ 05:43]  Immunofixation Serum:   Weak IgG Lambda Band Identified    Reference Range: None Detected      [01-19-22 @ 05:43]  SPEP Interpretation: Weak Gamma-Migrating Paraprotein Identified      [01-19-22 @ 05:43]      RADIOLOGY & ADDITIONAL STUDIES:

## 2024-08-20 NOTE — CONSULT NOTE ADULT - PROVIDER SPECIALTY LIST ADULT
Cardiology
Gastroenterology
Palliative Care
Nephrology
Neurology
Vascular Surgery
Infectious Disease
Burn
Neurology
Critical Care
Pain Medicine
Physiatry
Infectious Disease

## 2024-08-20 NOTE — CONSULT NOTE ADULT - CONSULT REASON
Drowsiness and weakness
EVAN on CKD
chronic pain syndrome
cardiac clearance for Burn OR debridement tomorrow
gait dysfunction
elevated ammonia
AMS
[place] placement for urgent HD
b/l buttocks/thigh wounds
sacral ulcer
GOC
Sacral Ulcer
pressure ulcer

## 2024-08-20 NOTE — CONSULT NOTE ADULT - CONSULT REQUESTED BY NAME
Medicine
primary team
MED
ED
Primary team
Tiago Penn
3A
IM
Primary Team
Tracy Rodriguez
Dr Edwards
emilio
Dr Delgado

## 2024-08-21 LAB
ALBUMIN SERPL ELPH-MCNC: 2.5 G/DL — LOW (ref 3.5–5.2)
ALP SERPL-CCNC: 88 U/L — SIGNIFICANT CHANGE UP (ref 30–115)
ALT FLD-CCNC: 9 U/L — SIGNIFICANT CHANGE UP (ref 0–41)
ANION GAP SERPL CALC-SCNC: 11 MMOL/L — SIGNIFICANT CHANGE UP (ref 7–14)
AST SERPL-CCNC: 19 U/L — SIGNIFICANT CHANGE UP (ref 0–41)
BILIRUB SERPL-MCNC: 0.4 MG/DL — SIGNIFICANT CHANGE UP (ref 0.2–1.2)
BUN SERPL-MCNC: 68 MG/DL — CRITICAL HIGH (ref 10–20)
CALCIUM SERPL-MCNC: 7.6 MG/DL — LOW (ref 8.4–10.5)
CHLORIDE SERPL-SCNC: 99 MMOL/L — SIGNIFICANT CHANGE UP (ref 98–110)
CO2 SERPL-SCNC: 27 MMOL/L — SIGNIFICANT CHANGE UP (ref 17–32)
CREAT SERPL-MCNC: 3 MG/DL — HIGH (ref 0.7–1.5)
EGFR: 15 ML/MIN/1.73M2 — LOW
GLUCOSE SERPL-MCNC: 77 MG/DL — SIGNIFICANT CHANGE UP (ref 70–99)
MAGNESIUM SERPL-MCNC: 2.2 MG/DL — SIGNIFICANT CHANGE UP (ref 1.8–2.4)
POTASSIUM SERPL-MCNC: 4.7 MMOL/L — SIGNIFICANT CHANGE UP (ref 3.5–5)
POTASSIUM SERPL-SCNC: 4.7 MMOL/L — SIGNIFICANT CHANGE UP (ref 3.5–5)
PROT SERPL-MCNC: 5 G/DL — LOW (ref 6–8)
SODIUM SERPL-SCNC: 137 MMOL/L — SIGNIFICANT CHANGE UP (ref 135–146)

## 2024-08-21 PROCEDURE — 99232 SBSQ HOSP IP/OBS MODERATE 35: CPT

## 2024-08-21 RX ORDER — HYDROMORPHONE HYDROCHLORIDE 2 MG/1
0.5 TABLET ORAL ONCE
Refills: 0 | Status: DISCONTINUED | OUTPATIENT
Start: 2024-08-21 | End: 2024-08-21

## 2024-08-21 RX ADMIN — METOPROLOL TARTRATE 12.5 MILLIGRAM(S): 100 TABLET ORAL at 06:18

## 2024-08-21 RX ADMIN — MEROPENEM 100 MILLIGRAM(S): 500 INJECTION, POWDER, FOR SOLUTION INTRAVENOUS at 06:25

## 2024-08-21 RX ADMIN — MIDODRINE HYDROCHLORIDE 10 MILLIGRAM(S): 5 TABLET ORAL at 06:18

## 2024-08-21 RX ADMIN — ACETAMINOPHEN 1000 MILLIGRAM(S): 325 TABLET ORAL at 07:26

## 2024-08-21 RX ADMIN — Medication 1 MILLIGRAM(S): at 11:29

## 2024-08-21 RX ADMIN — POLYETHYLENE GLYCOL 3350 17 GRAM(S): 17 POWDER, FOR SOLUTION ORAL at 11:30

## 2024-08-21 RX ADMIN — METOPROLOL TARTRATE 12.5 MILLIGRAM(S): 100 TABLET ORAL at 17:10

## 2024-08-21 RX ADMIN — MEROPENEM 100 MILLIGRAM(S): 500 INJECTION, POWDER, FOR SOLUTION INTRAVENOUS at 17:09

## 2024-08-21 RX ADMIN — TRAMADOL HYDROCHLORIDE 25 MILLIGRAM(S): 200 TABLET, EXTENDED RELEASE ORAL at 14:49

## 2024-08-21 RX ADMIN — ACETAMINOPHEN 1000 MILLIGRAM(S): 325 TABLET ORAL at 00:55

## 2024-08-21 RX ADMIN — SUCRALFATE 1 GRAM(S): 1 SUSPENSION ORAL at 06:19

## 2024-08-21 RX ADMIN — TRAMADOL HYDROCHLORIDE 25 MILLIGRAM(S): 200 TABLET, EXTENDED RELEASE ORAL at 21:24

## 2024-08-21 RX ADMIN — ACETAMINOPHEN 1000 MILLIGRAM(S): 325 TABLET ORAL at 06:18

## 2024-08-21 RX ADMIN — ACETAMINOPHEN 1000 MILLIGRAM(S): 325 TABLET ORAL at 13:06

## 2024-08-21 RX ADMIN — HYDROMORPHONE HYDROCHLORIDE 0.5 MILLIGRAM(S): 2 TABLET ORAL at 23:38

## 2024-08-21 RX ADMIN — SUCRALFATE 1 GRAM(S): 1 SUSPENSION ORAL at 17:10

## 2024-08-21 RX ADMIN — Medication 5000 UNIT(S): at 06:19

## 2024-08-21 RX ADMIN — TRAMADOL HYDROCHLORIDE 25 MILLIGRAM(S): 200 TABLET, EXTENDED RELEASE ORAL at 16:02

## 2024-08-21 RX ADMIN — Medication 2 TABLET(S): at 21:24

## 2024-08-21 RX ADMIN — BUMETANIDE 1 MILLIGRAM(S): 2 TABLET ORAL at 17:10

## 2024-08-21 RX ADMIN — MIDODRINE HYDROCHLORIDE 10 MILLIGRAM(S): 5 TABLET ORAL at 17:11

## 2024-08-21 RX ADMIN — Medication 20 GRAM(S): at 17:13

## 2024-08-21 RX ADMIN — Medication 20 GRAM(S): at 11:30

## 2024-08-21 RX ADMIN — Medication 5000 UNIT(S): at 17:13

## 2024-08-21 RX ADMIN — ACETAMINOPHEN 1000 MILLIGRAM(S): 325 TABLET ORAL at 22:05

## 2024-08-21 RX ADMIN — ACETAMINOPHEN 1000 MILLIGRAM(S): 325 TABLET ORAL at 21:24

## 2024-08-21 RX ADMIN — Medication 20 GRAM(S): at 06:19

## 2024-08-21 RX ADMIN — TRAMADOL HYDROCHLORIDE 25 MILLIGRAM(S): 200 TABLET, EXTENDED RELEASE ORAL at 23:00

## 2024-08-21 RX ADMIN — BUMETANIDE 1 MILLIGRAM(S): 2 TABLET ORAL at 06:26

## 2024-08-21 RX ADMIN — Medication 40 MILLIGRAM(S): at 06:19

## 2024-08-21 RX ADMIN — CHLORHEXIDINE GLUCONATE 1 APPLICATION(S): 40 SOLUTION TOPICAL at 11:30

## 2024-08-21 RX ADMIN — MIDODRINE HYDROCHLORIDE 10 MILLIGRAM(S): 5 TABLET ORAL at 11:29

## 2024-08-21 RX ADMIN — ACETAMINOPHEN 1000 MILLIGRAM(S): 325 TABLET ORAL at 13:33

## 2024-08-21 NOTE — PROGRESS NOTE ADULT - ATTENDING COMMENTS
Pt seen and examined at the bedside    Vitals: HD stable, O2 stable  Gen: appropriate affect, no acute distress  Neuro: no focal defects, no sensory deficits  HEENT: EOMI, no JVD, normocephalic, atraumatic, no scleral icterus  Cardio: RRR, S1 S2 present, no murmurs, rubs, or gallops  Resp: lungs b/l CTA, chest wall intact  Abd: soft, nondistended, nontender  MSK: no gross joint abnormalities, no obvious swelling  Skin: no rashes or wounds  Heme: no ecchymosis or petechiae present      %metabolic encephalopathy  / Hyperammonemia:   - mental status improved now, no indications for brain MRI  -CT head negative for intracranial abnormalities    Acute kidney inury on CKD stage 4/ rhabdomyolysis  - pt started on HD ( last HD on 8/15),  nephrology is following, discussed with nephrology today who advised right femoral U dall to be removed-[place] removed   - c/w Bumex 1 mg Q 12 hours ( recommended by nephrology)     anemia  -in setting of kidney disease  -on EPO    %Bilateral buttocks and sacrum deep Tissue Injury  bacteuria with ESBL Enterobacter with culver catheter:   - DECREASEd Meropenem 500 q12    %Acute on chronic  anemia/ History of AVMs (gastric and duodenal) and Dieulafoy Lesions Status Post Hemostasis in 2023/ History of perisplenic Varices in 2023   Chronic diastolic CHF/ Severe aortic stenosis - Compensated   - follow up with Dr. Hernandez as an outpatient for TAVR  - c/w metoprolol, c/w  Midodrine 10 q8H  - HD as scheduled     Disposition and Medical Necessity :   -STR 2-3 MN, pending Burn team I&D,     I personally reviewed labs and imaging (CT head imaging) and ordered necessary testing/medications  I discussed care of the patient with licensed providers  I personally reviewed chart and consultant recommendations  Pt has complex medical issues that require extensive diagnosis and intervention / threat to life  I personally spent 60 minutes in care of patient. Pt seen and examined at the bedside    Vitals: HD stable, O2 stable  Gen: appropriate affect, no acute distress  Neuro: no focal defects, no sensory deficits  HEENT: EOMI, no JVD, normocephalic, atraumatic, no scleral icterus  Cardio: RRR, S1 S2 present, no murmurs, rubs, or gallops  Resp: lungs b/l CTA, chest wall intact  Abd: soft, nondistended, nontender  MSK: no gross joint abnormalities, no obvious swelling  Skin: no rashes or wounds  Heme: no ecchymosis or petechiae present    metabolic encephalopathy  / Hyperammonemia:   - mental status improved now, no indications for brain MRI  -CT head negative for intracranial abnormalities    Acute kidney inury on CKD stage 4/ rhabdomyolysis  - pt started on HD ( last HD on 8/15),  nephrology is following, discussed with nephrology today who advised right femoral U dall to be removed-[place] removed   - c/w Bumex 1 mg Q 12 hours ( recommended by nephrology)     anemia  -in setting of kidney disease  -on EPO    Bilateral buttocks and sacrum deep Tissue Injury  bacteuria with ESBL Enterobacter with culver catheter:   - DECREASEd Meropenem 500 q12    Acute on chronic  anemia/ History of AVMs (gastric and duodenal) and Dieulafoy Lesions Status Post Hemostasis in 2023/ History of perisplenic Varices in 2023   Chronic diastolic CHF/ Severe aortic stenosis - Compensated   - follow up with Dr. Hernandez as an outpatient for TAVR  - c/w metoprolol, c/w  Midodrine 10 q8H  - HD as scheduled     Disposition and Medical Necessity :   -STR 2-3 MN, pending Burn team I&D,     I personally reviewed labs and imaging (CT head imaging) and ordered necessary testing/medications  I discussed care of the patient with licensed providers  I personally reviewed chart and consultant recommendations  Pt has complex medical issues that require extensive diagnosis and intervention / threat to life  I personally spent 60 minutes in care of patient.

## 2024-08-21 NOTE — PROGRESS NOTE ADULT - ASSESSMENT
78-year-old female with past medical history of   CKD, HTN, CCY, gout,  severe AS being planned for tAVR, and history of severe anemia secondary to chronic upper GI bleeding due to  AV malformation gastric body dieulafoy lesion, weekly blood transfusions presenting to ER for a rupture pressure ulcer of the buttock area today.   Patient states that last night was on her toilet which cracked while she was sitting on it, and patient was unable to get off the toilet for 4 hours.  By the time EMS arrived skin had ulcerated, with a blister that ruptured predominantly to the left buttock area (appears to be like burn blister that ruptured) with linear abrasions to the bilateral upper thighs/buttocks.    IMPRESSION/RECOMMENDATIONS  Immunosuppression/Immunosenescence ( above age 60 yrs there is a exponential decline in immunity which could result in poor clinical outcomes.  Sacrum : pressure ulcer with necrosis/ localized abscess/cellulitis  8/14 s/p debridement by Burn  Asymptomatic bacteuria with ESBL Enterobacter with culver catheter  No pyuria  No pyelonephritis  EVAN  No PNA  8/12 Doppler ; no DVT    -Off loading to prevent pressure sores and preventive measures to avoid aspiration  -bedside PT/Rehab. Out of bed to chair if possible.   -po Cipro 250 mg q12h and po Flagyl 500 mg q12h for 10 days     Please do not hesitate to recall ID if any questions arise either through [email or web address] or through [employer]

## 2024-08-21 NOTE — PROGRESS NOTE ADULT - SUBJECTIVE AND OBJECTIVE BOX
seen and examined  24 h events noted   no distress         PAST HISTORY  --------------------------------------------------------------------------------  No significant changes to PMH, PSH, FHx, SHx, unless otherwise noted    ALLERGIES & MEDICATIONS  --------------------------------------------------------------------------------  Allergies    aspirin (Rash; Other)  penicillins (Rash; Urticaria; Hives; Blisters)  latex (Unknown)  EKG leads (Hives)    Intolerances      Standing Inpatient Medications  acetaminophen     Tablet .. 1000 milliGRAM(s) Oral every 8 hours  buMETAnide 1 milliGRAM(s) Oral every 12 hours  chlorhexidine 2% Cloths 1 Application(s) Topical daily  epoetin raquel (EPOGEN) Injectable 74452 Unit(s) SubCutaneous every 7 days  folic acid 1 milliGRAM(s) Oral daily  heparin   Injectable 5000 Unit(s) SubCutaneous every 12 hours  lactulose Syrup 20 Gram(s) Oral every 6 hours  lidocaine 1%/epinephrine 1:100,000 Inj 2 Vial(s) Local Injection once  lidocaine 1%/epinephrine 1:100,000 Inj 3 Vial(s) Local Injection once  meropenem  IVPB 1000 milliGRAM(s) IV Intermittent every 12 hours  metoprolol tartrate 12.5 milliGRAM(s) Oral every 12 hours  midodrine. 10 milliGRAM(s) Oral three times a day  pantoprazole    Tablet 40 milliGRAM(s) Oral before breakfast  polyethylene glycol 3350 17 Gram(s) Oral daily  senna 2 Tablet(s) Oral at bedtime  sucralfate 1 Gram(s) Oral every 12 hours    PRN Inpatient Medications  traMADol 25 milliGRAM(s) Oral every 6 hours PRN          VITALS/PHYSICAL EXAM  --------------------------------------------------------------------------------  T(C): 36.7 (08-21-24 @ 07:43), Max: 36.9 (08-21-24 @ 01:05)  HR: 57 (08-21-24 @ 07:43) (57 - 64)  BP: 97/42 (08-21-24 @ 07:43) (97/42 - 115/66)  RR: 18 (08-21-24 @ 07:43) (18 - 18)  SpO2: 96% (08-21-24 @ 01:05) (95% - 96%)  Wt(kg): --        Physical Exam:  	Gen: NAD  	Pulm: decrease BS  B/L  	CV:  S1S2; no rub  	Abd: +distended  	    LABS/STUDIES  --------------------------------------------------------------------------------              8.6    7.18  >-----------<  181      [08-20-24 @ 22:11]              28.3     137  |  99  |  68  ----------------------------<  77      [08-20-24 @ 22:11]  4.7   |  27  |  3.0        Ca     7.6     [08-20-24 @ 22:11]      Mg     2.2     [08-20-24 @ 22:11]      Phos  4.7     [08-20-24 @ 07:40]    TPro  5.0  /  Alb  2.5  /  TBili  0.4  /  DBili  x   /  AST  19  /  ALT  9   /  AlkPhos  88  [08-20-24 @ 22:11]    PT/INR: PT 10.90, INR 0.96       [08-20-24 @ 22:11]  PTT: 34.7       [08-20-24 @ 22:11]      Creatinine Trend:  SCr 3.0 [08-20 @ 22:11]  SCr 3.0 [08-20 @ 07:40]  SCr 3.2 [08-19 @ 11:33]  SCr 3.2 [08-18 @ 08:17]  SCr 2.8 [08-17 @ 09:28]    Urinalysis - [08-20-24 @ 22:11]      Color  / Appearance  / SG  / pH       Gluc 77 / Ketone   / Bili  / Urobili        Blood  / Protein  / Leuk Est  / Nitrite       RBC  / WBC  / Hyaline  / Gran  / Sq Epi  / Non Sq Epi  / Bacteria       Iron 34, TIBC 150, %sat 23      [08-15-24 @ 10:36]  Ferritin 675      [08-15-24 @ 10:36]  TSH 4.36      [08-08-24 @ 09:40]    HBsAb Nonreact      [08-11-24 @ 11:39]  HBsAg Nonreact      [08-11-24 @ 11:39]  HBcAb Nonreact      [08-11-24 @ 11:39]

## 2024-08-21 NOTE — PROGRESS NOTE ADULT - SUBJECTIVE AND OBJECTIVE BOX
24H events:    Patient is a 78y old Female who presents with a chief complaint of sacral wound (15 Aug 2024 08:25)    Primary diagnosis of Pressure ulcer      Day 1:  Day 2:  Day 3:     Today is hospital day 22d. This morning patient was seen and examined at bedside, resting comfortably in bed.    No acute or major events overnight.    Code Status:    Family communication:  Contact date:  Name of person contacted:  Relationship to patient:  Communication details:  What matters most:    PAST MEDICAL & SURGICAL HISTORY  HTN (hypertension)    Heart murmur    Eczema    Anemia  iron infusions and PRBC transfusions    Aortic stenosis    Chronic kidney disease (CKD)    2019 novel coronavirus disease (COVID-19)  2020- REHAB admission    Gastric AVM    H/O appendicitis    S/P cholecystectomy    History of esophagogastroduodenoscopy (EGD)    H/O colonoscopy    H/O  section    History of appendectomy    Port-A-Cath in place  right CW      SOCIAL HISTORY:  Social History:      ALLERGIES:  aspirin (Rash; Other)  penicillins (Rash; Urticaria; Hives; Blisters)  latex (Unknown)  EKG leads (Hives)    MEDICATIONS:  STANDING MEDICATIONS  acetaminophen     Tablet .. 1000 milliGRAM(s) Oral every 8 hours  buMETAnide 1 milliGRAM(s) Oral every 12 hours  chlorhexidine 2% Cloths 1 Application(s) Topical daily  epoetin raquel (EPOGEN) Injectable 47654 Unit(s) SubCutaneous every 7 days  folic acid 1 milliGRAM(s) Oral daily  heparin   Injectable 5000 Unit(s) SubCutaneous every 12 hours  lactulose Syrup 20 Gram(s) Oral every 6 hours  lidocaine 1%/epinephrine 1:100,000 Inj 2 Vial(s) Local Injection once  lidocaine 1%/epinephrine 1:100,000 Inj 3 Vial(s) Local Injection once  meropenem  IVPB 1000 milliGRAM(s) IV Intermittent every 12 hours  metoprolol tartrate 12.5 milliGRAM(s) Oral every 12 hours  midodrine. 10 milliGRAM(s) Oral three times a day  pantoprazole    Tablet 40 milliGRAM(s) Oral before breakfast  polyethylene glycol 3350 17 Gram(s) Oral daily  senna 2 Tablet(s) Oral at bedtime  sucralfate 1 Gram(s) Oral every 12 hours    PRN MEDICATIONS  traMADol 25 milliGRAM(s) Oral every 6 hours PRN    VITALS:   T(F): 98.3  HR: 67  BP: 102/40  RR: 18  SpO2: 93%    PHYSICAL EXAM:  GENERAL:   ( x) NAD, lying in bed comfortably     (  ) obtunded     (  ) lethargic     (  ) somnolent      NECK:  (x) Supple     (  ) neck stiffness     (  ) nuchal rigidity     (  )  no JVD     (  ) JVD present ( -- cm)    HEART:  Rate -->     (x) normal rate     (  ) bradycardic     (  ) tachycardic  Rhythm -->     (x) regular     (  ) regularly irregular     (  ) irregularly irregular  Murmurs -->     (x) normal s1s2     (  ) systolic murmur     (  ) diastolic murmur     (  ) continuous murmur      (  ) S3 present     (  ) S4 present    LUNGS:   ( x)Unlabored respirations     (  ) tachypnea  ( x) B/L air entry     (  ) decreased breath sounds in:  (location     )    ( x) no adventitious sound     (  ) crackles     (  ) wheezing      (  ) rhonchi      (specify location:       )  (  ) chest wall tenderness (specify location:       )    ABDOMEN:   ( x) Soft     (  ) tense   |   (  ) nondistended     (  ) distended   |   (  ) +BS     (  ) hypoactive bowel sounds     (  ) hyperactive bowel sounds  ( x) nontender     (  ) RUQ tenderness     (  ) RLQ tenderness     (  ) LLQ tenderness     (  ) epigastric tenderness     (  ) diffuse tenderness  (  ) Splenomegaly      (  ) Hepatomegaly      (  ) Jaundice     (  ) ecchymosis     EXTREMITIES:  ( x) Normal     (  ) Rash     (  ) ecchymosis     (  ) varicose veins      (  ) pitting edema     (  ) non-pitting edema   (  ) ulceration     (  ) gangrene:     (location:     )    NERVOUS SYSTEM:    ( x) A&Ox3     (  ) confused     (  ) lethargic  CN II-XII:     ( x) Intact     (  ) deficits found     (Specify:     )   Upper extremities:     (  ) no sensorimotor deficits     (  ) weakness     (  ) loss of proprioception/vibration     (  ) loss of touch/temperature (specify:    )  Lower extremities:     (  ) no sensorimotor deficits     (  ) weakness     (  ) loss of proprioception/vibration     (  ) loss of touch/temperature (specify:    )    SKIN:   (  ) No rashes or lesions     (  ) maculopapular rash     (  ) pustules     (  ) vesicles     (  ) ulcer     (  ) ecchymosis     (specify location:     )    LABS:                        8.1    5.84  )-----------( 152      ( 20 Aug 2024 07:40 )             26.4     08-20    139  |  103  |  66<HH>  ----------------------------<  83  4.5   |  24  |  3.0<H>    Ca    7.9<L>      20 Aug 2024 07:40  Phos  4.7     08-  Mg     2.1     08-20    TPro  4.9<L>  /  Alb  2.3<L>  /  TBili  0.5  /  DBili  x   /  AST  17  /  ALT  9   /  AlkPhos  83  08-20      Urinalysis Basic - ( 20 Aug 2024 07:40 )    Color: x / Appearance: x / SG: x / pH: x  Gluc: 83 mg/dL / Ketone: x  / Bili: x / Urobili: x   Blood: x / Protein: x / Nitrite: x   Leuk Esterase: x / RBC: x / WBC x   Sq Epi: x / Non Sq Epi: x / Bacteria: x                RADIOLOGY:           24H events:    Patient is a 78y old Female who presents with a chief complaint of sacral wound (15 Aug 2024 08:25)    Primary diagnosis of Pressure ulcer    pt is anxious to go to surgery today for debridement       Code Status:    Family communication:  Contact date:  Name of person contacted:  Relationship to patient:  Communication details:  What matters most:    PAST MEDICAL & SURGICAL HISTORY  HTN (hypertension)    Heart murmur    Eczema    Anemia  iron infusions and PRBC transfusions    Aortic stenosis    Chronic kidney disease (CKD)     novel coronavirus disease (COVID-19)  2020- REHAB admission    Gastric AVM    H/O appendicitis    S/P cholecystectomy    History of esophagogastroduodenoscopy (EGD)    H/O colonoscopy    H/O  section    History of appendectomy    Port-A-Cath in place  right CW      SOCIAL HISTORY:  Social History:      ALLERGIES:  aspirin (Rash; Other)  penicillins (Rash; Urticaria; Hives; Blisters)  latex (Unknown)  EKG leads (Hives)    MEDICATIONS:  STANDING MEDICATIONS  acetaminophen     Tablet .. 1000 milliGRAM(s) Oral every 8 hours  buMETAnide 1 milliGRAM(s) Oral every 12 hours  chlorhexidine 2% Cloths 1 Application(s) Topical daily  epoetin raquel (EPOGEN) Injectable 60132 Unit(s) SubCutaneous every 7 days  folic acid 1 milliGRAM(s) Oral daily  heparin   Injectable 5000 Unit(s) SubCutaneous every 12 hours  lactulose Syrup 20 Gram(s) Oral every 6 hours  lidocaine 1%/epinephrine 1:100,000 Inj 2 Vial(s) Local Injection once  lidocaine 1%/epinephrine 1:100,000 Inj 3 Vial(s) Local Injection once  meropenem  IVPB 1000 milliGRAM(s) IV Intermittent every 12 hours  metoprolol tartrate 12.5 milliGRAM(s) Oral every 12 hours  midodrine. 10 milliGRAM(s) Oral three times a day  pantoprazole    Tablet 40 milliGRAM(s) Oral before breakfast  polyethylene glycol 3350 17 Gram(s) Oral daily  senna 2 Tablet(s) Oral at bedtime  sucralfate 1 Gram(s) Oral every 12 hours    PRN MEDICATIONS  traMADol 25 milliGRAM(s) Oral every 6 hours PRN    VITALS:   T(F): 98.3  HR: 67  BP: 102/40  RR: 18  SpO2: 93%    PHYSICAL EXAM:  GENERAL:   ( x) NAD, lying in bed comfortably     (  ) obtunded     (  ) lethargic     (  ) somnolent      NECK:  (x) Supple     (  ) neck stiffness     (  ) nuchal rigidity     (  )  no JVD     (  ) JVD present ( -- cm)    HEART:  Rate -->     (x) normal rate     (  ) bradycardic     (  ) tachycardic  Rhythm -->     (x) regular     (  ) regularly irregular     (  ) irregularly irregular  Murmurs -->     (x) normal s1s2     (  ) systolic murmur     (  ) diastolic murmur     (  ) continuous murmur      (  ) S3 present     (  ) S4 present    LUNGS:   ( x)Unlabored respirations     (  ) tachypnea  ( x) B/L air entry     (  ) decreased breath sounds in:  (location     )    ( x) no adventitious sound     (  ) crackles     (  ) wheezing      (  ) rhonchi      (specify location:       )  (  ) chest wall tenderness (specify location:       )    ABDOMEN:   ( x) Soft     (  ) tense   |   (  ) nondistended     (  ) distended   |   (  ) +BS     (  ) hypoactive bowel sounds     (  ) hyperactive bowel sounds  ( x) nontender     (  ) RUQ tenderness     (  ) RLQ tenderness     (  ) LLQ tenderness     (  ) epigastric tenderness     (  ) diffuse tenderness  (  ) Splenomegaly      (  ) Hepatomegaly      (  ) Jaundice     (  ) ecchymosis     EXTREMITIES:  ( x) Normal     (  ) Rash     (  ) ecchymosis     (  ) varicose veins      (  ) pitting edema     (  ) non-pitting edema   (  ) ulceration     (  ) gangrene:     (location:     )    NERVOUS SYSTEM:    ( x) A&Ox3     (  ) confused     (  ) lethargic  CN II-XII:     ( x) Intact     (  ) deficits found     (Specify:     )   Upper extremities:     (  ) no sensorimotor deficits     (  ) weakness     (  ) loss of proprioception/vibration     (  ) loss of touch/temperature (specify:    )  Lower extremities:     (  ) no sensorimotor deficits     (  ) weakness     (  ) loss of proprioception/vibration     (  ) loss of touch/temperature (specify:    )    SKIN:   (  ) No rashes or lesions     (  ) maculopapular rash     (  ) pustules     (  ) vesicles     (  ) ulcer     (  ) ecchymosis     (specify location:     )    LABS:                        8.1    5.84  )-----------( 152      ( 20 Aug 2024 07:40 )             26.4     08-20    139  |  103  |  66<HH>  ----------------------------<  83  4.5   |  24  |  3.0<H>    Ca    7.9<L>      20 Aug 2024 07:40  Phos  4.7     08-  Mg     2.1     08-20    TPro  4.9<L>  /  Alb  2.3<L>  /  TBili  0.5  /  DBili  x   /  AST  17  /  ALT  9   /  AlkPhos  83  08-20      Urinalysis Basic - ( 20 Aug 2024 07:40 )    Color: x / Appearance: x / SG: x / pH: x  Gluc: 83 mg/dL / Ketone: x  / Bili: x / Urobili: x   Blood: x / Protein: x / Nitrite: x   Leuk Esterase: x / RBC: x / WBC x   Sq Epi: x / Non Sq Epi: x / Bacteria: x                RADIOLOGY:

## 2024-08-21 NOTE — PROGRESS NOTE ADULT - ASSESSMENT
78-year-old female with past medical history of   CKD, HTN, CCY, gout,  severe AS being planned for TAVR, and history of severe anemia secondary to chronic upper GI bleeding due to  AV malformation gastric body dieulafoy lesion, weekly blood transfusions presenting to ER for a rupture pressure ulcer of the buttock area.    #EVAN on CKD4/ anemia   #AMS  - creat noted at baseline from 4/2024 stable at 3's  - no HD for now / on Bumex cont   - please document UO   - no hydro on renal/bladder sono   - last phos noted; monitor  - BP noted; continue  midodrine 10mg q8h    - cont WOODY wkly, Tsat low/ no venofer on atb   -  on atb followed by ID /can do merrem 1 g q12h   - cardiology noted appreciated   - seen by burn   - overall prognosis poor   will follow

## 2024-08-21 NOTE — PROGRESS NOTE ADULT - ASSESSMENT
78-year-old female with past medical history of   CKD, HTN, CCY, gout,  severe AS being planned for tAVR, and history of severe anemia secondary to chronic upper GI bleeding due to  AV malformation gastric body dieulafoy lesion, weekly blood transfusions presenting to ER for a rupture pressure ulcer of the buttock area. Hospital course complicated by worsening encephalopathy s/p urgent HD, upgraded to SDU    A/P   # Worsening metabolic encephalopathy /Acute kidney inury on CKD stage 4/ rhabdomyolysis / Hyperammonemia   - mental status improved now, no indications for  brain MRI  - pt started on HD ( last HD on 8/15),  nephrology is following, discussed with nephrology today who advised right femoral U dall to be removed-[place] removed   - CTAP done and reported-  No evidence of an intraperitoneal or retroperitoneal hematoma; CT evidence of anemia as above. Moderate rectal stool burden with associated rectal wall thickening and adjacent edema. Findings may reflect developing stercoral proctitis.  - patient started on Lactulose--> now having BM, goal 2-3 BM daily   - c/w Lokelma   - renal diet  - no hydro on renal/bladder sono   - c/w Bumex 1 mg Q 12 hours ( recommended by nephrology)   - if cr continues to increase will resume hd in am   - non oliguric   - supportive care     # Bilateral buttocks pressure wounds:    - 8/13 s/p bedside debridement of bilateral buttocks by Dr Schwarz   - continue local wound care bid: wash wounds with soap and water, apply hydrogel, cover with Vashe moist gauze, then apply ABD and tape.  - pain management  - offloading, position changes  -Planned for debridment in OR with Dr Schwarz in AM. NPO MN. 8pm preop labs, cardio clearance     - c/w wound care per burn, s/p debridement on 8/14  - off load pressure points, change position Q 2 hours   - Fu Burn   - C/w Meropenam 500 q12    # Acute on chronic  anemia/ History of AVMs (gastric and duodenal) and Dieulafoy Lesions Status Post Hemostasis in 2023/ History of perisplenic Varices in 2023   - s/p EGD/colonoscopy/push enteroscopy on 7/22 showing AVMs and polyps  - s/p 1U pRBC on 8/2, s/p course of Venofer  - on weekly IV transfusion/iron infusions per hematology  - monitor h/h, keep active T&S  - keep Hb above 7.0     # Chronic diastolic CHF/ Severe aortic stenosis   -  TTE Echo Complete w/o Contrast w/ Doppler (08.01.24 @ 11:03) >EF of 56 %. Mild asymmetric left ventricular hypertrophy. Grade II diastolic dysfunction). Severe aortic valve stenosis , Mild to moderate mitral valve regurgitation. Moderate aortic regurgitation.  - Patient to follow up with Dr. Hernandez as an outpatient for TAVR  - c/w metoprolol, c/w  Midodrine 10 q8H  - HD as scheduled       #Progress Note Handoff  Pending (specify): Debridement with burn in AM       78-year-old female with past medical history of   CKD, HTN, CCY, gout,  severe AS being planned for tAVR, and history of severe anemia secondary to chronic upper GI bleeding due to  AV malformation gastric body dieulafoy lesion, weekly blood transfusions presenting to ER for a rupture pressure ulcer of the buttock area. Hospital course complicated by worsening encephalopathy s/p urgent HD, upgraded to SDU    A/P   # Worsening metabolic encephalopathy /Acute kidney inury on CKD stage 4/ rhabdomyolysis / Hyperammonemia   - mental status improved now, no indications for brain MRI  - pt started on HD ( last HD on 8/15),  nephrology is following, discussed with nephrology today who advised right femoral U dall to be removed-[place] removed   - CTAP done and reported-  No evidence of an intraperitoneal or retroperitoneal hematoma; CT evidence of anemia as above. Moderate rectal stool burden with associated rectal wall thickening and adjacent edema. Findings may reflect developing stercoral proctitis.  - patient started on Lactulose--> now having BM, goal 2-3 BM daily   - c/w Lokelma   - renal diet  - no hydro on renal/bladder sono   - c/w Bumex 1 mg Q 12 hours ( recommended by nephrology)   - if cr continues to increase will resume hd in am   - non oliguric   - supportive care     # Bilateral buttocks pressure wounds:    - 8/13 s/p bedside debridement of bilateral buttocks by Dr Schwarz   - continue local wound care bid: wash wounds with soap and water, apply hydrogel, cover with Vashe moist gauze, then apply ABD and tape.  - pain management  - offloading, position changes  -Planned for debridment in OR with Dr Schwarz in AM. NPO MN. 8pm preop labs, cardio clearance     - c/w wound care per burn, s/p debridement on 8/14  - off load pressure points, change position Q 2 hours   - Fu Burn   - C/w Meropenam 500 q12    # Acute on chronic  anemia/ History of AVMs (gastric and duodenal) and Dieulafoy Lesions Status Post Hemostasis in 2023/ History of perisplenic Varices in 2023   - s/p EGD/colonoscopy/push enteroscopy on 7/22 showing AVMs and polyps  - s/p 1U pRBC on 8/2, s/p course of Venofer  - on weekly IV transfusion/iron infusions per hematology  - monitor h/h, keep active T&S  - keep Hb above 7.0     # Chronic diastolic CHF/ Severe aortic stenosis   -  TTE Echo Complete w/o Contrast w/ Doppler (08.01.24 @ 11:03) >EF of 56 %. Mild asymmetric left ventricular hypertrophy. Grade II diastolic dysfunction). Severe aortic valve stenosis , Mild to moderate mitral valve regurgitation. Moderate aortic regurgitation.  - Patient to follow up with Dr. Hernandez as an outpatient for TAVR  - c/w metoprolol, c/w  Midodrine 10 q8H  - HD as scheduled       #Progress Note Handoff  Pending (specify): Debridement with burn in AM

## 2024-08-21 NOTE — PROGRESS NOTE ADULT - SUBJECTIVE AND OBJECTIVE BOX
LATRICIA ARREAGA  78y, Female    All available historical data reviewed    OVERNIGHT EVENTS:  feels well and has no new complaints  No fevers     ROS:  General: Denies rigors, nightsweats  HEENT: Denies headache, rhinorrhea, sore throat, eye pain  CV: Denies CP, palpitations  PULM: Denies wheezing, hemoptysis  GI: Denies hematemesis, hematochezia, melena  : Denies discharge, hematuria  MSK: Denies arthralgias, myalgias  SKIN: Denies rash, lesions  NEURO: Denies paresthesias, weakness  PSYCH: Denies depression, anxiety    VITALS:  T(F): 98.1, Max: 98.4 (08-21-24 @ 01:05)  HR: 57  BP: 97/42  RR: 18Vital Signs Last 24 Hrs  T(C): 36.7 (21 Aug 2024 07:43), Max: 36.9 (21 Aug 2024 01:05)  T(F): 98.1 (21 Aug 2024 07:43), Max: 98.4 (21 Aug 2024 01:05)  HR: 57 (21 Aug 2024 07:43) (57 - 62)  BP: 97/42 (21 Aug 2024 07:43) (97/42 - 106/45)  BP(mean): --  RR: 18 (21 Aug 2024 07:43) (18 - 18)  SpO2: 96% (21 Aug 2024 01:05) (96% - 96%)    Parameters below as of 21 Aug 2024 01:05  Patient On (Oxygen Delivery Method): room air        TESTS & MEASUREMENTS:                        8.6    7.18  )-----------( 181      ( 20 Aug 2024 22:11 )             28.3     08-20    137  |  99  |  68<HH>  ----------------------------<  77  4.7   |  27  |  3.0<H>    Ca    7.6<L>      20 Aug 2024 22:11  Phos  4.7     08-20  Mg     2.2     08-20    TPro  5.0<L>  /  Alb  2.5<L>  /  TBili  0.4  /  DBili  x   /  AST  19  /  ALT  9   /  AlkPhos  88  08-20    LIVER FUNCTIONS - ( 20 Aug 2024 22:11 )  Alb: 2.5 g/dL / Pro: 5.0 g/dL / ALK PHOS: 88 U/L / ALT: 9 U/L / AST: 19 U/L / GGT: x             Urinalysis Basic - ( 20 Aug 2024 22:11 )    Color: x / Appearance: x / SG: x / pH: x  Gluc: 77 mg/dL / Ketone: x  / Bili: x / Urobili: x   Blood: x / Protein: x / Nitrite: x   Leuk Esterase: x / RBC: x / WBC x   Sq Epi: x / Non Sq Epi: x / Bacteria: x          Social History:  Tobacco Use: No  Alcohol Use: No  Drug Use: No    RADIOLOGY & ADDITIONAL TESTS:  Personal review of radiological diagnostics performed  Echo and EKG results noted when applicable.     MEDICATIONS:  acetaminophen     Tablet .. 1000 milliGRAM(s) Oral every 8 hours  buMETAnide 1 milliGRAM(s) Oral every 12 hours  chlorhexidine 2% Cloths 1 Application(s) Topical daily  epoetin raquel (EPOGEN) Injectable 62378 Unit(s) SubCutaneous every 7 days  folic acid 1 milliGRAM(s) Oral daily  heparin   Injectable 5000 Unit(s) SubCutaneous every 12 hours  lactulose Syrup 20 Gram(s) Oral every 6 hours  lidocaine 1%/epinephrine 1:100,000 Inj 2 Vial(s) Local Injection once  lidocaine 1%/epinephrine 1:100,000 Inj 3 Vial(s) Local Injection once  meropenem  IVPB 1000 milliGRAM(s) IV Intermittent every 12 hours  metoprolol tartrate 12.5 milliGRAM(s) Oral every 12 hours  midodrine. 10 milliGRAM(s) Oral three times a day  morphine  - Injectable 2 milliGRAM(s) IV Push two times a day PRN  pantoprazole    Tablet 40 milliGRAM(s) Oral before breakfast  polyethylene glycol 3350 17 Gram(s) Oral daily  senna 2 Tablet(s) Oral at bedtime  sucralfate 1 Gram(s) Oral every 12 hours  traMADol 25 milliGRAM(s) Oral every 6 hours PRN      ANTIBIOTICS:  meropenem  IVPB 1000 milliGRAM(s) IV Intermittent every 12 hours

## 2024-08-21 NOTE — PROGRESS NOTE ADULT - TIME BILLING
Direct patient care. Discussed on rounds with Housestaff
Direct patient care. Discussed on rounds with Housestaff
time spent on review of labs, imaging studies, old records, obtaining history, personally examining patient, counselling and communicating with patient, entering orders for medications/tests/etc, discussions with other health care providers, documentation in electronic health records, independent interpretation of labs, imaging/procedure results and care coordination.
Direct patient care. Discussed o rounds with Housestaff
time spent on review of labs, imaging studies, old records, obtaining history, personally examining patient, counselling and communicating with patient, entering orders for medications/tests/etc, discussions with other health care providers, documentation in electronic health records, independent interpretation of labs, imaging/procedure results and care coordination.
time spent on review of labs, imaging studies, old records, obtaining history, personally examining patient, counselling and communicating with patient, entering orders for medications/tests/etc, discussions with other health care providers, documentation in electronic health records, independent interpretation of labs, imaging  results and care coordination.
I have personally seen and examined this patient. I have reviewed all pertinent clinical information and reviewed all relevant imaging ( and noted the impression from the Radiologist ) and diagnostic studies personally. I counseled the patient about the diagnostic testing and treatment plan. I discussed my recommendations with the primary team.

## 2024-08-22 LAB
ALBUMIN SERPL ELPH-MCNC: 2.5 G/DL — LOW (ref 3.5–5.2)
ALP SERPL-CCNC: 98 U/L — SIGNIFICANT CHANGE UP (ref 30–115)
ALT FLD-CCNC: 9 U/L — SIGNIFICANT CHANGE UP (ref 0–41)
ANION GAP SERPL CALC-SCNC: 11 MMOL/L — SIGNIFICANT CHANGE UP (ref 7–14)
AST SERPL-CCNC: 23 U/L — SIGNIFICANT CHANGE UP (ref 0–41)
BASOPHILS # BLD AUTO: 0.01 K/UL — SIGNIFICANT CHANGE UP (ref 0–0.2)
BASOPHILS NFR BLD AUTO: 0.1 % — SIGNIFICANT CHANGE UP (ref 0–1)
BILIRUB SERPL-MCNC: 0.6 MG/DL — SIGNIFICANT CHANGE UP (ref 0.2–1.2)
BLD GP AB SCN SERPL QL: SIGNIFICANT CHANGE UP
BUN SERPL-MCNC: 72 MG/DL — CRITICAL HIGH (ref 10–20)
CALCIUM SERPL-MCNC: 7.7 MG/DL — LOW (ref 8.4–10.5)
CHLORIDE SERPL-SCNC: 100 MMOL/L — SIGNIFICANT CHANGE UP (ref 98–110)
CO2 SERPL-SCNC: 26 MMOL/L — SIGNIFICANT CHANGE UP (ref 17–32)
CREAT SERPL-MCNC: 2.6 MG/DL — HIGH (ref 0.7–1.5)
EGFR: 18 ML/MIN/1.73M2 — LOW
EOSINOPHIL # BLD AUTO: 0.54 K/UL — SIGNIFICANT CHANGE UP (ref 0–0.7)
EOSINOPHIL NFR BLD AUTO: 6.3 % — SIGNIFICANT CHANGE UP (ref 0–8)
GLUCOSE SERPL-MCNC: 81 MG/DL — SIGNIFICANT CHANGE UP (ref 70–99)
HCT VFR BLD CALC: 27.3 % — LOW (ref 37–47)
HGB BLD-MCNC: 8.5 G/DL — LOW (ref 12–16)
IMM GRANULOCYTES NFR BLD AUTO: 0.5 % — HIGH (ref 0.1–0.3)
LYMPHOCYTES # BLD AUTO: 0.9 K/UL — LOW (ref 1.2–3.4)
LYMPHOCYTES # BLD AUTO: 10.5 % — LOW (ref 20.5–51.1)
MAGNESIUM SERPL-MCNC: 2.2 MG/DL — SIGNIFICANT CHANGE UP (ref 1.8–2.4)
MCHC RBC-ENTMCNC: 30.5 PG — SIGNIFICANT CHANGE UP (ref 27–31)
MCHC RBC-ENTMCNC: 31.1 G/DL — LOW (ref 32–37)
MCV RBC AUTO: 97.8 FL — SIGNIFICANT CHANGE UP (ref 81–99)
MONOCYTES # BLD AUTO: 0.67 K/UL — HIGH (ref 0.1–0.6)
MONOCYTES NFR BLD AUTO: 7.8 % — SIGNIFICANT CHANGE UP (ref 1.7–9.3)
NEUTROPHILS # BLD AUTO: 6.41 K/UL — SIGNIFICANT CHANGE UP (ref 1.4–6.5)
NEUTROPHILS NFR BLD AUTO: 74.8 % — SIGNIFICANT CHANGE UP (ref 42.2–75.2)
NRBC # BLD: 0 /100 WBCS — SIGNIFICANT CHANGE UP (ref 0–0)
PLATELET # BLD AUTO: 190 K/UL — SIGNIFICANT CHANGE UP (ref 130–400)
PMV BLD: 9.9 FL — SIGNIFICANT CHANGE UP (ref 7.4–10.4)
POTASSIUM SERPL-MCNC: 4.8 MMOL/L — SIGNIFICANT CHANGE UP (ref 3.5–5)
POTASSIUM SERPL-SCNC: 4.8 MMOL/L — SIGNIFICANT CHANGE UP (ref 3.5–5)
PROT SERPL-MCNC: 5 G/DL — LOW (ref 6–8)
RBC # BLD: 2.79 M/UL — LOW (ref 4.2–5.4)
RBC # FLD: 18.7 % — HIGH (ref 11.5–14.5)
SODIUM SERPL-SCNC: 137 MMOL/L — SIGNIFICANT CHANGE UP (ref 135–146)
WBC # BLD: 8.57 K/UL — SIGNIFICANT CHANGE UP (ref 4.8–10.8)
WBC # FLD AUTO: 8.57 K/UL — SIGNIFICANT CHANGE UP (ref 4.8–10.8)

## 2024-08-22 PROCEDURE — 99233 SBSQ HOSP IP/OBS HIGH 50: CPT

## 2024-08-22 PROCEDURE — 93010 ELECTROCARDIOGRAM REPORT: CPT

## 2024-08-22 RX ORDER — TRAMADOL HYDROCHLORIDE 200 MG/1
50 TABLET, EXTENDED RELEASE ORAL EVERY 6 HOURS
Refills: 0 | Status: DISCONTINUED | OUTPATIENT
Start: 2024-08-22 | End: 2024-08-23

## 2024-08-22 RX ORDER — BUMETANIDE 2 MG/1
1 TABLET ORAL EVERY 12 HOURS
Refills: 0 | Status: DISCONTINUED | OUTPATIENT
Start: 2024-08-22 | End: 2024-08-22

## 2024-08-22 RX ORDER — BUMETANIDE 2 MG/1
1 TABLET ORAL
Refills: 0 | Status: DISCONTINUED | OUTPATIENT
Start: 2024-08-23 | End: 2024-08-23

## 2024-08-22 RX ORDER — METRONIDAZOLE 250 MG
500 TABLET ORAL EVERY 12 HOURS
Refills: 0 | Status: DISCONTINUED | OUTPATIENT
Start: 2024-08-22 | End: 2024-08-23

## 2024-08-22 RX ORDER — HYDROMORPHONE HYDROCHLORIDE 2 MG/1
0.5 TABLET ORAL ONCE
Refills: 0 | Status: DISCONTINUED | OUTPATIENT
Start: 2024-08-22 | End: 2024-08-22

## 2024-08-22 RX ADMIN — ACETAMINOPHEN 1000 MILLIGRAM(S): 325 TABLET ORAL at 13:08

## 2024-08-22 RX ADMIN — CHLORHEXIDINE GLUCONATE 1 APPLICATION(S): 40 SOLUTION TOPICAL at 11:27

## 2024-08-22 RX ADMIN — TRAMADOL HYDROCHLORIDE 50 MILLIGRAM(S): 200 TABLET, EXTENDED RELEASE ORAL at 21:00

## 2024-08-22 RX ADMIN — HYDROMORPHONE HYDROCHLORIDE 0.5 MILLIGRAM(S): 2 TABLET ORAL at 23:12

## 2024-08-22 RX ADMIN — Medication 1 MILLIGRAM(S): at 11:25

## 2024-08-22 RX ADMIN — MIDODRINE HYDROCHLORIDE 10 MILLIGRAM(S): 5 TABLET ORAL at 05:53

## 2024-08-22 RX ADMIN — POLYETHYLENE GLYCOL 3350 17 GRAM(S): 17 POWDER, FOR SOLUTION ORAL at 11:27

## 2024-08-22 RX ADMIN — Medication 250 MILLIGRAM(S): at 17:19

## 2024-08-22 RX ADMIN — Medication 20 GRAM(S): at 11:27

## 2024-08-22 RX ADMIN — SUCRALFATE 1 GRAM(S): 1 SUSPENSION ORAL at 05:53

## 2024-08-22 RX ADMIN — HYDROMORPHONE HYDROCHLORIDE 0.5 MILLIGRAM(S): 2 TABLET ORAL at 12:41

## 2024-08-22 RX ADMIN — Medication 250 MILLIGRAM(S): at 09:53

## 2024-08-22 RX ADMIN — Medication 500 MILLILITER(S): at 08:18

## 2024-08-22 RX ADMIN — Medication 20 GRAM(S): at 23:12

## 2024-08-22 RX ADMIN — BUMETANIDE 1 MILLIGRAM(S): 2 TABLET ORAL at 05:54

## 2024-08-22 RX ADMIN — Medication 20 GRAM(S): at 17:18

## 2024-08-22 RX ADMIN — HYDROMORPHONE HYDROCHLORIDE 0.5 MILLIGRAM(S): 2 TABLET ORAL at 23:50

## 2024-08-22 RX ADMIN — MIDODRINE HYDROCHLORIDE 10 MILLIGRAM(S): 5 TABLET ORAL at 17:16

## 2024-08-22 RX ADMIN — ACETAMINOPHEN 1000 MILLIGRAM(S): 325 TABLET ORAL at 05:53

## 2024-08-22 RX ADMIN — METOPROLOL TARTRATE 12.5 MILLIGRAM(S): 100 TABLET ORAL at 05:53

## 2024-08-22 RX ADMIN — ACETAMINOPHEN 1000 MILLIGRAM(S): 325 TABLET ORAL at 06:31

## 2024-08-22 RX ADMIN — Medication 40 MILLIGRAM(S): at 05:53

## 2024-08-22 RX ADMIN — METOPROLOL TARTRATE 12.5 MILLIGRAM(S): 100 TABLET ORAL at 17:16

## 2024-08-22 RX ADMIN — Medication 500 MILLIGRAM(S): at 08:17

## 2024-08-22 RX ADMIN — TRAMADOL HYDROCHLORIDE 25 MILLIGRAM(S): 200 TABLET, EXTENDED RELEASE ORAL at 10:21

## 2024-08-22 RX ADMIN — Medication 5000 UNIT(S): at 17:18

## 2024-08-22 RX ADMIN — TRAMADOL HYDROCHLORIDE 50 MILLIGRAM(S): 200 TABLET, EXTENDED RELEASE ORAL at 20:17

## 2024-08-22 RX ADMIN — Medication 500 MILLIGRAM(S): at 21:09

## 2024-08-22 RX ADMIN — Medication 2 TABLET(S): at 21:09

## 2024-08-22 RX ADMIN — ACETAMINOPHEN 1000 MILLIGRAM(S): 325 TABLET ORAL at 13:30

## 2024-08-22 RX ADMIN — TRAMADOL HYDROCHLORIDE 25 MILLIGRAM(S): 200 TABLET, EXTENDED RELEASE ORAL at 11:00

## 2024-08-22 RX ADMIN — ACETAMINOPHEN 1000 MILLIGRAM(S): 325 TABLET ORAL at 22:00

## 2024-08-22 RX ADMIN — HYDROMORPHONE HYDROCHLORIDE 0.5 MILLIGRAM(S): 2 TABLET ORAL at 12:36

## 2024-08-22 RX ADMIN — Medication 5000 UNIT(S): at 05:54

## 2024-08-22 RX ADMIN — ACETAMINOPHEN 1000 MILLIGRAM(S): 325 TABLET ORAL at 21:09

## 2024-08-22 RX ADMIN — HYDROMORPHONE HYDROCHLORIDE 0.5 MILLIGRAM(S): 2 TABLET ORAL at 00:20

## 2024-08-22 RX ADMIN — Medication 20 GRAM(S): at 05:54

## 2024-08-22 RX ADMIN — SUCRALFATE 1 GRAM(S): 1 SUSPENSION ORAL at 17:16

## 2024-08-22 RX ADMIN — MIDODRINE HYDROCHLORIDE 10 MILLIGRAM(S): 5 TABLET ORAL at 11:25

## 2024-08-22 NOTE — PHARMACOTHERAPY INTERVENTION NOTE - COMMENTS
Recommended to initiate metronidazole 500 mg PO q12h for SSTI as per ID. CrCl 23.      Coco PérezD   Clinical Pharmacy Specialist, Infectious Diseases  Tele-Antimicrobial Stewardship Program (Tele-ASP)  Tele-ASP Phone: [phone]

## 2024-08-22 NOTE — CHART NOTE - NSCHARTNOTEFT_GEN_A_CORE
Pt scheduled for OR tomorrow. Please pre-op pt    NPO At midnight  CXR and ECG on this admission  active type and screen, CBC, CMP, Mag, phos, PT/PTT/INR  correct electrolyte imbalances  pt need cardiology clearance

## 2024-08-22 NOTE — PROGRESS NOTE ADULT - ASSESSMENT
78-year-old female with past medical history of   CKD, HTN, CCY, gout,  severe AS being planned for tAVR, and history of severe anemia secondary to chronic upper GI bleeding due to  AV malformation gastric body dieulafoy lesion, weekly blood transfusions presenting to ER for a rupture pressure ulcer of the buttock area. Hospital course complicated by worsening encephalopathy s/p urgent HD, upgraded to SDU    A/P   # Worsening metabolic encephalopathy /Acute kidney inury on CKD stage 4/ rhabdomyolysis / Hyperammonemia   - mental status improved now, no indications for brain MRI  - pt started on HD ( last HD on 8/15),  nephrology is following, discussed with nephrology today who advised right femoral U dall to be removed-[place] removed   - CTAP done and reported-  No evidence of an intraperitoneal or retroperitoneal hematoma; CT evidence of anemia as above. Moderate rectal stool burden with associated rectal wall thickening and adjacent edema. Findings may reflect developing stercoral proctitis.  - patient started on Lactulose--> now having BM, goal 2-3 BM daily   - c/w Lokelma   - renal diet  - no hydro on renal/bladder sono   - c/w Bumex 1 mg Q 12 hours ( recommended by nephrology)   - if cr continues to increase will resume hd in am   - non oliguric   - supportive care     # Bilateral buttocks pressure wounds:    - 8/13 s/p bedside debridement of bilateral buttocks by Dr Schwarz   - continue local wound care bid: wash wounds with soap and water, apply hydrogel, cover with Vashe moist gauze, then apply ABD and tape.  - pain management  - offloading, position changes  -Planned for debridment in OR with Dr Schwarz in AM. NPO MN. 8pm preop labs, cardio clearance     - c/w wound care per burn, s/p debridement on 8/14  - off load pressure points, change position Q 2 hours   - Fu Burn   - C/w Meropenam 500 q12    # Acute on chronic  anemia/ History of AVMs (gastric and duodenal) and Dieulafoy Lesions Status Post Hemostasis in 2023/ History of perisplenic Varices in 2023   - s/p EGD/colonoscopy/push enteroscopy on 7/22 showing AVMs and polyps  - s/p 1U pRBC on 8/2, s/p course of Venofer  - on weekly IV transfusion/iron infusions per hematology  - monitor h/h, keep active T&S  - keep Hb above 7.0     # Chronic diastolic CHF/ Severe aortic stenosis   -  TTE Echo Complete w/o Contrast w/ Doppler (08.01.24 @ 11:03) >EF of 56 %. Mild asymmetric left ventricular hypertrophy. Grade II diastolic dysfunction). Severe aortic valve stenosis , Mild to moderate mitral valve regurgitation. Moderate aortic regurgitation.  - Patient to follow up with Dr. Hernandez as an outpatient for TAVR  - c/w metoprolol, c/w  Midodrine 10 q8H  - HD as scheduled       #Progress Note Handoff  Pending (specify): Debridement with burn in AM, hold bumex while npo    78-year-old female with past medical history of   CKD, HTN, CCY, gout,  severe AS being planned for tAVR, and history of severe anemia secondary to chronic upper GI bleeding due to  AV malformation gastric body dieulafoy lesion, weekly blood transfusions presenting to ER for a rupture pressure ulcer of the buttock area. Hospital course complicated by worsening encephalopathy s/p urgent HD, upgraded to SDU    A/P   # Worsening metabolic encephalopathy /Acute kidney inury on CKD stage 4/ rhabdomyolysis / Hyperammonemia   - mental status improved now, no indications for brain MRI  - pt started on HD ( last HD on 8/15),  nephrology is following, discussed with nephrology today who advised right femoral U dall to be removed-[place] removed   - CTAP done and reported-  No evidence of an intraperitoneal or retroperitoneal hematoma; CT evidence of anemia as above. Moderate rectal stool burden with associated rectal wall thickening and adjacent edema. Findings may reflect developing stercoral proctitis.  - patient started on Lactulose--> now having BM, goal 2-3 BM daily   - c/w Lokelma   - renal diet  - no hydro on renal/bladder sono   - c/w Bumex 1 mg Q 12 hours ( recommended by nephrology)   - if cr continues to increase will resume hd in am   - non oliguric   - supportive care     # Bilateral buttocks pressure wounds:    - 8/13 s/p bedside debridement of bilateral buttocks by Dr Schwarz   - continue local wound care bid: wash wounds with soap and water, apply hydrogel, cover with Vashe moist gauze, then apply ABD and tape.  - pain management  - offloading, position changes  -Planned for debridment in OR with Dr Schwarz in AM. NPO MN. 8pm preop labs, cardio clearance     - c/w wound care per burn, s/p debridement on 8/14  - off load pressure points, change position Q 2 hours   - Fu Burn   - s/p Meropenam> switch to Cipro / Flagyl     # Acute on chronic  anemia/ History of AVMs (gastric and duodenal) and Dieulafoy Lesions Status Post Hemostasis in 2023/ History of perisplenic Varices in 2023   - s/p EGD/colonoscopy/push enteroscopy on 7/22 showing AVMs and polyps  - s/p 1U pRBC on 8/2, s/p course of Venofer  - on weekly IV transfusion/iron infusions per hematology  - monitor h/h, keep active T&S  - keep Hb above 7.0     # Chronic diastolic CHF/ Severe aortic stenosis   -  TTE Echo Complete w/o Contrast w/ Doppler (08.01.24 @ 11:03) >EF of 56 %. Mild asymmetric left ventricular hypertrophy. Grade II diastolic dysfunction). Severe aortic valve stenosis , Mild to moderate mitral valve regurgitation. Moderate aortic regurgitation.  - Patient to follow up with Dr. Hernandez as an outpatient for TAVR  - c/w metoprolol, c/w  Midodrine 10 q8H  - HD as scheduled       #Progress Note Handoff  Pending (specify): Debridement with burn in AM, hold bumex while npo    78-year-old female with past medical history of   CKD, HTN, CCY, gout,  severe AS being planned for tAVR, and history of severe anemia secondary to chronic upper GI bleeding due to  AV malformation gastric body dieulafoy lesion, weekly blood transfusions presenting to ER for a rupture pressure ulcer of the buttock area. Hospital course complicated by worsening encephalopathy s/p urgent HD, upgraded to SDU    A/P   # Worsening metabolic encephalopathy /Acute kidney inury on CKD stage 4/ rhabdomyolysis / Hyperammonemia   - mental status improved now, no indications for brain MRI  - pt started on HD ( last HD on 8/15),  nephrology is following, discussed with nephrology today who advised right femoral U dall to be removed-[place] removed   - CTAP done and reported-  No evidence of an intraperitoneal or retroperitoneal hematoma; CT evidence of anemia as above. Moderate rectal stool burden with associated rectal wall thickening and adjacent edema. Findings may reflect developing stercoral proctitis.  - patient started on Lactulose--> now having BM, goal 2-3 BM daily   - c/w Lokelma   - renal diet  - no hydro on renal/bladder sono   - c/w Bumex 1 mg Q 12 hours ( recommended by nephrology)   - if cr continues to increase will resume hd in am   - non oliguric   - supportive care     # Bilateral buttocks pressure wounds:    - 8/13 s/p bedside debridement of bilateral buttocks by Dr Schwarz   - continue local wound care bid: wash wounds with soap and water, apply hydrogel, cover with Vashe moist gauze, then apply ABD and tape.  - pain management  - offloading, position changes  -Planned for debridment in OR with Dr Schwarz in AM. NPO MN. 8pm preop labs, cardio clearance     - c/w wound care per burn, s/p debridement on 8/14  - off load pressure points, change position Q 2 hours   - Fu Burn   - s/p Meropenam> switch to Cipro / Flagyl     # Acute on chronic  anemia/ History of AVMs (gastric and duodenal) and Dieulafoy Lesions Status Post Hemostasis in 2023/ History of perisplenic Varices in 2023   - s/p EGD/colonoscopy/push enteroscopy on 7/22 showing AVMs and polyps  - s/p 1U pRBC on 8/2, s/p course of Venofer  - on weekly IV transfusion/iron infusions per hematology  - monitor h/h, keep active T&S  - keep Hb above 7.0     # Chronic diastolic CHF/ Severe aortic stenosis   -  TTE Echo Complete w/o Contrast w/ Doppler (08.01.24 @ 11:03) >EF of 56 %. Mild asymmetric left ventricular hypertrophy. Grade II diastolic dysfunction). Severe aortic valve stenosis , Mild to moderate mitral valve regurgitation. Moderate aortic regurgitation.  - Patient to follow up with Dr. Hernandez as an outpatient for TAVR  - c/w metoprolol, c/w  Midodrine 10 q8H  - HD as scheduled       #Progress Note Handoff  Pending (specify): Debridement with burn in AM, hold Bumex while npo

## 2024-08-22 NOTE — PROGRESS NOTE ADULT - SUBJECTIVE AND OBJECTIVE BOX
Nephrology progress note    Patient is seen and examined, events over the last 24 h noted .  No SOB  Feels ok  Has LE edema  Allergies:  aspirin (Rash; Other)  penicillins (Rash; Urticaria; Hives; Blisters)  latex (Unknown)  EKG leads (Hives)    Hospital Medications:   MEDICATIONS  (STANDING):  acetaminophen     Tablet .. 1000 milliGRAM(s) Oral every 8 hours  chlorhexidine 2% Cloths 1 Application(s) Topical daily  ciprofloxacin     Tablet 250 milliGRAM(s) Oral two times a day  epoetin raquel (EPOGEN) Injectable 06868 Unit(s) SubCutaneous every 7 days  folic acid 1 milliGRAM(s) Oral daily  heparin   Injectable 5000 Unit(s) SubCutaneous every 12 hours  lactulose Syrup 20 Gram(s) Oral every 6 hours  lidocaine 1%/epinephrine 1:100,000 Inj 3 Vial(s) Local Injection once  lidocaine 1%/epinephrine 1:100,000 Inj 2 Vial(s) Local Injection once  metoprolol tartrate 12.5 milliGRAM(s) Oral every 12 hours  metroNIDAZOLE    Tablet 500 milliGRAM(s) Oral every 12 hours  midodrine. 10 milliGRAM(s) Oral three times a day  pantoprazole    Tablet 40 milliGRAM(s) Oral before breakfast  polyethylene glycol 3350 17 Gram(s) Oral daily  senna 2 Tablet(s) Oral at bedtime  sucralfate 1 Gram(s) Oral every 12 hours        VITALS:  T(F): 98 (08-22-24 @ 15:00), Max: 98 (08-22-24 @ 15:00)  HR: 64 (08-22-24 @ 15:00)  BP: 102/59 (08-22-24 @ 15:00)  RR: 18 (08-22-24 @ 07:35)  SpO2: 96% (08-22-24 @ 00:05)  Wt(kg): --    08-21 @ 07:01  -  08-22 @ 07:00  --------------------------------------------------------  IN: 0 mL / OUT: 1400 mL / NET: -1400 mL          PHYSICAL EXAM:  Constitutional: NAD  HEENT: anicteric sclera  Neck: No JVD  Respiratory: CTA  Cardiovascular: S1, S2, RRR  Gastrointestinal: BS+, soft, NT/ND  Extremities: HAs peripheral edema  Neurological: A/O x 3  : No CVA tenderness. + culver.   Skin: No rashes  Vascular Access: Leroy cath    LABS:  08-22    137  |  100  |  72<HH>  ----------------------------<  81  4.8   |  26  |  2.6<H>  Creatinine Trend: 2.6<--, 3.0<--, 3.0<--, 3.2<--, 3.2<--, 2.8<--  Ca    7.7<L>      22 Aug 2024 07:08  Mg     2.2     08-22    TPro  5.0<L>  /  Alb  2.5<L>  /  TBili  0.6  /  DBili      /  AST  23  /  ALT  9   /  AlkPhos  98  08-22                          8.5    8.57  )-----------( 190      ( 22 Aug 2024 07:08 )             27.3       Urine Studies:  Urinalysis Basic - ( 22 Aug 2024 07:08 )    Color:  / Appearance:  / SG:  / pH:   Gluc: 81 mg/dL / Ketone:   / Bili:  / Urobili:    Blood:  / Protein:  / Nitrite:    Leuk Esterase:  / RBC:  / WBC    Sq Epi:  / Non Sq Epi:  / Bacteria:         RADIOLOGY & ADDITIONAL STUDIES:  < from: Xray Chest 1 View- PORTABLE-Urgent (Xray Chest 1 View- PORTABLE-Urgent .) (08.20.24 @ 13:39) >    1. Unchanged bilateral opacities.    < end of copied text >

## 2024-08-22 NOTE — PROGRESS NOTE ADULT - ATTENDING COMMENTS
78-year-old female with past medical history of   CKD, HTN, CCY, gout,  severe AS being planned for tAVR, and history of severe anemia secondary to chronic upper GI bleeding due to  AV malformation gastric body dieulafoy lesion, weekly blood transfusions presenting to ER for a rupture pressure ulcer of the buttock area. Admitted for metabolic encephalopathy 2/2 uremia vs hyperammonemia, started on HD.  Mental status improved. now currently monitoring off HD.        # Metabolic encephalopathy - resolved   # Acute kidney injury on CKD stage 4  # Hyperammonemia - per GI, not hepatic encephalopathy   - mental status improved now, no need for brain MRI  - currently monitoring off HD, [place] removed   - on Lactulose   - c/w Bumex 1 mg Q 12 hours (hold while NPO or hypotension)      # Sacrum : pressure ulcer with necrosis/ localized abscess/cellulitis  - 8/14 s/p bedside debridement of bilateral buttocks by Dr Schwarz   - continue local wound care bid: wash wounds with soap and water, apply hydrogel, cover with Vashe moist gauze, then apply ABD and tape.  - pain management - Tylenol ATC with tramadol PRN   - offloading, position changes  - Planned for debridement in OR with Dr Schwarz in AM   - on Cipro and flagyl   - Monitor QTc     # Asymptomatic bacteruria with ESBL Enterobacter with culver catheter  - s/p 6 days of meropenem     # Prolonged QTc   - repeat EKG     # Acute on chronic  anemia/ History of AVMs (gastric and duodenal) and Dieulafoy Lesions Status Post Hemostasis in 2023/ History of perisplenic Varices in 2023   - s/p EGD/colonoscopy/push enteroscopy on 7/22 showing AVMs and polyps  - s/p 1U pRBC on 8/2, s/p course of Venofer  - on weekly IV transfusion/iron infusions per hematology  - monitor h/h, keep active T&S  - keep Hb above 7.0     # Chronic diastolic CHF/ Severe aortic stenosis   -  TTE Echo Complete w/o Contrast w/ Doppler (08.01.24 @ 11:03) >EF of 56 %. Mild asymmetric left ventricular hypertrophy. Grade II diastolic dysfunction). Severe aortic valve stenosis. Mild to moderate mitral valve regurgitation. Moderate aortic regurgitation.  - Patient to follow up with Dr. Hernandez as an outpatient for TAVR  - c/w metoprolol, c/w  Midodrine 10 q8H    dvt ppx     #Progress Note Handoff  Pending (specify): Debridement with burn in AM, check QTc  Plan of care d/w son at bedside in length

## 2024-08-22 NOTE — PROGRESS NOTE ADULT - SUBJECTIVE AND OBJECTIVE BOX
24H events:    Patient is a 78y old Female who presents with a chief complaint of sacral wound (15 Aug 2024 08:25)    Primary diagnosis of Pressure ulcer      Day 1:  Day 2:  Day 3:     Today is hospital day 24d. This morning patient was seen and examined at bedside, resting comfortably in bed.    No acute or major events overnight.    Code Status:    Family communication:  Contact date:  Name of person contacted:  Relationship to patient:  Communication details:  What matters most:    PAST MEDICAL & SURGICAL HISTORY  HTN (hypertension)    Heart murmur    Eczema    Anemia  iron infusions and PRBC transfusions    Aortic stenosis    Chronic kidney disease (CKD)    2019 novel coronavirus disease (COVID-19)  2020- REHAB admission    Gastric AVM    H/O appendicitis    S/P cholecystectomy    History of esophagogastroduodenoscopy (EGD)    H/O colonoscopy    H/O  section    History of appendectomy    Port-A-Cath in place  right CW      SOCIAL HISTORY:  Social History:      ALLERGIES:  aspirin (Rash; Other)  penicillins (Rash; Urticaria; Hives; Blisters)  latex (Unknown)  EKG leads (Hives)    MEDICATIONS:  STANDING MEDICATIONS  acetaminophen     Tablet .. 1000 milliGRAM(s) Oral every 8 hours  chlorhexidine 2% Cloths 1 Application(s) Topical daily  ciprofloxacin     Tablet 250 milliGRAM(s) Oral two times a day  epoetin raquel (EPOGEN) Injectable 84898 Unit(s) SubCutaneous every 7 days  folic acid 1 milliGRAM(s) Oral daily  heparin   Injectable 5000 Unit(s) SubCutaneous every 12 hours  lactulose Syrup 20 Gram(s) Oral every 6 hours  lidocaine 1%/epinephrine 1:100,000 Inj 2 Vial(s) Local Injection once  lidocaine 1%/epinephrine 1:100,000 Inj 3 Vial(s) Local Injection once  metoprolol tartrate 12.5 milliGRAM(s) Oral every 12 hours  metroNIDAZOLE    Tablet 500 milliGRAM(s) Oral every 12 hours  midodrine. 10 milliGRAM(s) Oral three times a day  pantoprazole    Tablet 40 milliGRAM(s) Oral before breakfast  polyethylene glycol 3350 17 Gram(s) Oral daily  senna 2 Tablet(s) Oral at bedtime  sucralfate 1 Gram(s) Oral every 12 hours    PRN MEDICATIONS  traMADol 25 milliGRAM(s) Oral every 6 hours PRN    VITALS:   T(F): 97.4  HR: 60  BP: 105/45  RR: 18  SpO2: 96%    PHYSICAL EXAM:  GENERAL:   ( x) NAD, lying in bed comfortably     (  ) obtunded     (  ) lethargic     (  ) somnolent      HEART:  Rate -->     (x) normal rate     (  ) bradycardic     (  ) tachycardic  Rhythm -->     (x) regular     (  ) regularly irregular     (  ) irregularly irregular  Murmurs -->     (x) normal s1s2     (  ) systolic murmur     (  ) diastolic murmur     (  ) continuous murmur      (  ) S3 present     (  ) S4 present    LUNGS:   ( x)Unlabored respirations   Decreased BS    ABDOMEN:   ( x) Soft     nondistended     ( x) nontender        NERVOUS SYSTEM:    ( x) A&Ox3           LABS:                        8.5    8.57  )-----------( 190      ( 22 Aug 2024 07:08 )             27.3     08-    137  |  100  |  72<HH>  ----------------------------<  81  4.8   |  26  |  2.6<H>    Ca    7.7<L>      22 Aug 2024 07:08  Mg     2.2     08-    TPro  5.0<L>  /  Alb  2.5<L>  /  TBili  0.6  /  DBili  x   /  AST  23  /  ALT  9   /  AlkPhos  98  08-22    PT/INR - ( 20 Aug 2024 22:11 )   PT: 10.90 sec;   INR: 0.96 ratio         PTT - ( 20 Aug 2024 22:11 )  PTT:34.7 sec  Urinalysis Basic - ( 22 Aug 2024 07:08 )    Color: x / Appearance: x / SG: x / pH: x  Gluc: 81 mg/dL / Ketone: x  / Bili: x / Urobili: x   Blood: x / Protein: x / Nitrite: x   Leuk Esterase: x / RBC: x / WBC x   Sq Epi: x / Non Sq Epi: x / Bacteria: x                RADIOLOGY:

## 2024-08-22 NOTE — PROGRESS NOTE ADULT - ASSESSMENT
78-year-old female with past medical history of   CKD, HTN, CCY, gout,  severe AS being planned for TAVR, and history of severe anemia secondary to chronic upper GI bleeding due to  AV malformation gastric body dieulafoy lesion, weekly blood transfusions presenting to ER for a rupture pressure ulcer of the buttock area.    #EVAN on CKD4/ anemia   #AMS  - creat noted at baseline from 4/2024 stable at 3's  - no HD for now last HD was 8/15   needs Bumex 2 mg po or IV daily, cont Midodrine  If creat cont to drop - will need [place] cath removed  - please document UO   - no hydro on renal/bladder sono   - cont WOODY wkly, [place] low/ no venofer on atb   -  on atb followed by ID /can do merrem 1 g q12h   - cardiology noted appreciated   - seen by burn   - overall prognosis poor   will follow

## 2024-08-22 NOTE — PHARMACOTHERAPY INTERVENTION NOTE - COMMENTS
Recommended to change meropenem to ciprofloxacin 250 mg q12h for SSTI. Crcl 23.      Demi Babin, PharmD   Clinical Pharmacy Specialist, Infectious Diseases  Tele-Antimicrobial Stewardship Program (Tele-ASP)  Tele-ASP Phone: [phone]   Recommended to change meropenem to ciprofloxacin 250 mg PO q12h for SSTI. CrCl 23.      Demi Babin, PharmD   Clinical Pharmacy Specialist, Infectious Diseases  Tele-Antimicrobial Stewardship Program (Tele-[facility])  Tele-ASP Phone: [phone]

## 2024-08-23 ENCOUNTER — APPOINTMENT (OUTPATIENT)
Age: 79
End: 2024-08-23

## 2024-08-23 LAB
ALBUMIN SERPL ELPH-MCNC: 2.4 G/DL — LOW (ref 3.5–5.2)
ALP SERPL-CCNC: 91 U/L — SIGNIFICANT CHANGE UP (ref 30–115)
ALT FLD-CCNC: 7 U/L — SIGNIFICANT CHANGE UP (ref 0–41)
ANION GAP SERPL CALC-SCNC: 10 MMOL/L — SIGNIFICANT CHANGE UP (ref 7–14)
AST SERPL-CCNC: 22 U/L — SIGNIFICANT CHANGE UP (ref 0–41)
BASOPHILS # BLD AUTO: 0.02 K/UL — SIGNIFICANT CHANGE UP (ref 0–0.2)
BASOPHILS NFR BLD AUTO: 0.4 % — SIGNIFICANT CHANGE UP (ref 0–1)
BILIRUB SERPL-MCNC: 0.4 MG/DL — SIGNIFICANT CHANGE UP (ref 0.2–1.2)
BUN SERPL-MCNC: 76 MG/DL — CRITICAL HIGH (ref 10–20)
CALCIUM SERPL-MCNC: 7.6 MG/DL — LOW (ref 8.4–10.5)
CHLORIDE SERPL-SCNC: 102 MMOL/L — SIGNIFICANT CHANGE UP (ref 98–110)
CO2 SERPL-SCNC: 27 MMOL/L — SIGNIFICANT CHANGE UP (ref 17–32)
CREAT SERPL-MCNC: 2.6 MG/DL — HIGH (ref 0.7–1.5)
EGFR: 18 ML/MIN/1.73M2 — LOW
EOSINOPHIL # BLD AUTO: 0.27 K/UL — SIGNIFICANT CHANGE UP (ref 0–0.7)
EOSINOPHIL NFR BLD AUTO: 4.9 % — SIGNIFICANT CHANGE UP (ref 0–8)
GLUCOSE BLDC GLUCOMTR-MCNC: 102 MG/DL — HIGH (ref 70–99)
GLUCOSE BLDC GLUCOMTR-MCNC: 96 MG/DL — SIGNIFICANT CHANGE UP (ref 70–99)
GLUCOSE SERPL-MCNC: 86 MG/DL — SIGNIFICANT CHANGE UP (ref 70–99)
HCT VFR BLD CALC: 25.6 % — LOW (ref 37–47)
HGB BLD-MCNC: 8 G/DL — LOW (ref 12–16)
IMM GRANULOCYTES NFR BLD AUTO: 0.7 % — HIGH (ref 0.1–0.3)
LYMPHOCYTES # BLD AUTO: 0.97 K/UL — LOW (ref 1.2–3.4)
LYMPHOCYTES # BLD AUTO: 17.6 % — LOW (ref 20.5–51.1)
MAGNESIUM SERPL-MCNC: 2.5 MG/DL — HIGH (ref 1.8–2.4)
MCHC RBC-ENTMCNC: 30.7 PG — SIGNIFICANT CHANGE UP (ref 27–31)
MCHC RBC-ENTMCNC: 31.3 G/DL — LOW (ref 32–37)
MCV RBC AUTO: 98.1 FL — SIGNIFICANT CHANGE UP (ref 81–99)
MONOCYTES # BLD AUTO: 0.68 K/UL — HIGH (ref 0.1–0.6)
MONOCYTES NFR BLD AUTO: 12.3 % — HIGH (ref 1.7–9.3)
NEUTROPHILS # BLD AUTO: 3.54 K/UL — SIGNIFICANT CHANGE UP (ref 1.4–6.5)
NEUTROPHILS NFR BLD AUTO: 64.1 % — SIGNIFICANT CHANGE UP (ref 42.2–75.2)
NRBC # BLD: 0 /100 WBCS — SIGNIFICANT CHANGE UP (ref 0–0)
PLATELET # BLD AUTO: 177 K/UL — SIGNIFICANT CHANGE UP (ref 130–400)
PMV BLD: 9.6 FL — SIGNIFICANT CHANGE UP (ref 7.4–10.4)
POTASSIUM SERPL-MCNC: 5.2 MMOL/L — HIGH (ref 3.5–5)
POTASSIUM SERPL-SCNC: 5.2 MMOL/L — HIGH (ref 3.5–5)
PROT SERPL-MCNC: 5 G/DL — LOW (ref 6–8)
RBC # BLD: 2.61 M/UL — LOW (ref 4.2–5.4)
RBC # FLD: 18.7 % — HIGH (ref 11.5–14.5)
SODIUM SERPL-SCNC: 139 MMOL/L — SIGNIFICANT CHANGE UP (ref 135–146)
WBC # BLD: 5.52 K/UL — SIGNIFICANT CHANGE UP (ref 4.8–10.8)
WBC # FLD AUTO: 5.52 K/UL — SIGNIFICANT CHANGE UP (ref 4.8–10.8)

## 2024-08-23 PROCEDURE — 99232 SBSQ HOSP IP/OBS MODERATE 35: CPT

## 2024-08-23 PROCEDURE — 21501 I&D DP ABSC/HMTMA SFT TS NCK: CPT

## 2024-08-23 RX ORDER — ACETAMINOPHEN 325 MG/1
1000 TABLET ORAL EVERY 8 HOURS
Refills: 0 | Status: DISCONTINUED | OUTPATIENT
Start: 2024-08-23 | End: 2024-08-28

## 2024-08-23 RX ORDER — PANTOPRAZOLE SODIUM 40 MG
40 TABLET, DELAYED RELEASE (ENTERIC COATED) ORAL
Refills: 0 | Status: DISCONTINUED | OUTPATIENT
Start: 2024-08-23 | End: 2024-08-28

## 2024-08-23 RX ORDER — METOPROLOL TARTRATE 100 MG/1
12.5 TABLET ORAL EVERY 12 HOURS
Refills: 0 | Status: DISCONTINUED | OUTPATIENT
Start: 2024-08-23 | End: 2024-08-26

## 2024-08-23 RX ORDER — EPOETIN ALFA 3000 [IU]/ML
10000 SOLUTION INTRAVENOUS; SUBCUTANEOUS
Refills: 0 | Status: DISCONTINUED | OUTPATIENT
Start: 2024-08-23 | End: 2024-08-28

## 2024-08-23 RX ORDER — SENNA 187 MG
2 TABLET ORAL AT BEDTIME
Refills: 0 | Status: DISCONTINUED | OUTPATIENT
Start: 2024-08-23 | End: 2024-08-28

## 2024-08-23 RX ORDER — LIDOCAINE HCL/EPINEPHRINE 2%-1:50000
2 SYRINGE (ML) INJECTION ONCE
Refills: 0 | Status: COMPLETED | OUTPATIENT
Start: 2024-08-23 | End: 2024-08-23

## 2024-08-23 RX ORDER — POLYETHYLENE GLYCOL 3350 17 G/17G
17 POWDER, FOR SOLUTION ORAL DAILY
Refills: 0 | Status: DISCONTINUED | OUTPATIENT
Start: 2024-08-23 | End: 2024-08-28

## 2024-08-23 RX ORDER — HEPARIN SODIUM,BOVINE 1000/ML
5000 VIAL (ML) INJECTION EVERY 12 HOURS
Refills: 0 | Status: DISCONTINUED | OUTPATIENT
Start: 2024-08-23 | End: 2024-08-27

## 2024-08-23 RX ORDER — SODIUM ZIRCONIUM CYCLOSILICATE 5 G/5G
5 POWDER, FOR SUSPENSION ORAL ONCE
Refills: 0 | Status: COMPLETED | OUTPATIENT
Start: 2024-08-23 | End: 2024-08-23

## 2024-08-23 RX ORDER — CHLORHEXIDINE GLUCONATE 40 MG/ML
1 SOLUTION TOPICAL DAILY
Refills: 0 | Status: DISCONTINUED | OUTPATIENT
Start: 2024-08-23 | End: 2024-08-28

## 2024-08-23 RX ORDER — HYDROMORPHONE HYDROCHLORIDE 2 MG/1
0.5 TABLET ORAL
Refills: 0 | Status: DISCONTINUED | OUTPATIENT
Start: 2024-08-23 | End: 2024-08-24

## 2024-08-23 RX ORDER — MIDODRINE HYDROCHLORIDE 5 MG/1
10 TABLET ORAL THREE TIMES A DAY
Refills: 0 | Status: DISCONTINUED | OUTPATIENT
Start: 2024-08-23 | End: 2024-08-28

## 2024-08-23 RX ORDER — BUMETANIDE 2 MG/1
1 TABLET ORAL
Refills: 0 | Status: DISCONTINUED | OUTPATIENT
Start: 2024-08-23 | End: 2024-08-27

## 2024-08-23 RX ORDER — TRAMADOL HYDROCHLORIDE 200 MG/1
50 TABLET, EXTENDED RELEASE ORAL EVERY 6 HOURS
Refills: 0 | Status: DISCONTINUED | OUTPATIENT
Start: 2024-08-23 | End: 2024-08-24

## 2024-08-23 RX ORDER — ONDANSETRON 2 MG/ML
4 INJECTION, SOLUTION INTRAMUSCULAR; INTRAVENOUS ONCE
Refills: 0 | Status: DISCONTINUED | OUTPATIENT
Start: 2024-08-23 | End: 2024-08-23

## 2024-08-23 RX ORDER — HYDROMORPHONE HYDROCHLORIDE 2 MG/1
0.5 TABLET ORAL
Refills: 0 | Status: DISCONTINUED | OUTPATIENT
Start: 2024-08-23 | End: 2024-08-23

## 2024-08-23 RX ORDER — LACTULOSE 10 G
20 PACKET (EA) ORAL EVERY 6 HOURS
Refills: 0 | Status: DISCONTINUED | OUTPATIENT
Start: 2024-08-23 | End: 2024-08-28

## 2024-08-23 RX ORDER — FOLIC ACID 1 MG
1 TABLET ORAL DAILY
Refills: 0 | Status: DISCONTINUED | OUTPATIENT
Start: 2024-08-23 | End: 2024-08-28

## 2024-08-23 RX ORDER — METRONIDAZOLE 250 MG
500 TABLET ORAL EVERY 12 HOURS
Refills: 0 | Status: DISCONTINUED | OUTPATIENT
Start: 2024-08-23 | End: 2024-08-25

## 2024-08-23 RX ORDER — SUCRALFATE 1 G/10ML
1 SUSPENSION ORAL EVERY 12 HOURS
Refills: 0 | Status: DISCONTINUED | OUTPATIENT
Start: 2024-08-23 | End: 2024-08-28

## 2024-08-23 RX ADMIN — Medication 20 GRAM(S): at 12:32

## 2024-08-23 RX ADMIN — TRAMADOL HYDROCHLORIDE 50 MILLIGRAM(S): 200 TABLET, EXTENDED RELEASE ORAL at 17:43

## 2024-08-23 RX ADMIN — ACETAMINOPHEN 1000 MILLIGRAM(S): 325 TABLET ORAL at 06:42

## 2024-08-23 RX ADMIN — Medication 250 MILLIGRAM(S): at 06:20

## 2024-08-23 RX ADMIN — HYDROMORPHONE HYDROCHLORIDE 0.5 MILLIGRAM(S): 2 TABLET ORAL at 22:20

## 2024-08-23 RX ADMIN — SUCRALFATE 1 GRAM(S): 1 SUSPENSION ORAL at 17:42

## 2024-08-23 RX ADMIN — METOPROLOL TARTRATE 12.5 MILLIGRAM(S): 100 TABLET ORAL at 17:43

## 2024-08-23 RX ADMIN — Medication 1 MILLIGRAM(S): at 12:32

## 2024-08-23 RX ADMIN — METOPROLOL TARTRATE 12.5 MILLIGRAM(S): 100 TABLET ORAL at 06:20

## 2024-08-23 RX ADMIN — Medication 20 GRAM(S): at 06:17

## 2024-08-23 RX ADMIN — BUMETANIDE 1 MILLIGRAM(S): 2 TABLET ORAL at 12:32

## 2024-08-23 RX ADMIN — Medication 500 MILLIGRAM(S): at 17:43

## 2024-08-23 RX ADMIN — MIDODRINE HYDROCHLORIDE 10 MILLIGRAM(S): 5 TABLET ORAL at 12:35

## 2024-08-23 RX ADMIN — MIDODRINE HYDROCHLORIDE 10 MILLIGRAM(S): 5 TABLET ORAL at 06:19

## 2024-08-23 RX ADMIN — ACETAMINOPHEN 1000 MILLIGRAM(S): 325 TABLET ORAL at 12:32

## 2024-08-23 RX ADMIN — ACETAMINOPHEN 1000 MILLIGRAM(S): 325 TABLET ORAL at 06:17

## 2024-08-23 RX ADMIN — Medication 20 GRAM(S): at 17:44

## 2024-08-23 RX ADMIN — POLYETHYLENE GLYCOL 3350 17 GRAM(S): 17 POWDER, FOR SOLUTION ORAL at 12:31

## 2024-08-23 RX ADMIN — SUCRALFATE 1 GRAM(S): 1 SUSPENSION ORAL at 06:43

## 2024-08-23 RX ADMIN — MIDODRINE HYDROCHLORIDE 10 MILLIGRAM(S): 5 TABLET ORAL at 17:43

## 2024-08-23 RX ADMIN — CHLORHEXIDINE GLUCONATE 1 APPLICATION(S): 40 SOLUTION TOPICAL at 12:32

## 2024-08-23 RX ADMIN — HYDROMORPHONE HYDROCHLORIDE 0.5 MILLIGRAM(S): 2 TABLET ORAL at 21:26

## 2024-08-23 RX ADMIN — Medication 2 TABLET(S): at 21:27

## 2024-08-23 RX ADMIN — Medication 5000 UNIT(S): at 17:42

## 2024-08-23 RX ADMIN — Medication 40 MILLIGRAM(S): at 06:43

## 2024-08-23 NOTE — PROGRESS NOTE ADULT - SUBJECTIVE AND OBJECTIVE BOX
24H events:    Patient is a 78y old Female who presents with a chief complaint of sacral wound (15 Aug 2024 08:25)    Primary diagnosis of Pressure ulcer      Today is 25d of hospitalization. This morning patient was seen and examined at bedside, resting comfortably in bed.    No acute or major events overnight.    Code Status:    Family communication:  Contact date:  Name of person contacted:  Relationship to patient:  Communication details:  What matters most:    PAST MEDICAL & SURGICAL HISTORY  HTN (hypertension)    Heart murmur    Eczema    Anemia  iron infusions and PRBC transfusions    Aortic stenosis    Chronic kidney disease (CKD)    2019 novel coronavirus disease (COVID-19)  2020- REHAB admission    Gastric AVM    H/O appendicitis    S/P cholecystectomy    History of esophagogastroduodenoscopy (EGD)    H/O colonoscopy    H/O  section    History of appendectomy    Port-A-Cath in place  right CW      SOCIAL HISTORY:  Social History:      ALLERGIES:  aspirin (Rash; Other)  penicillins (Rash; Urticaria; Hives; Blisters)  latex (Unknown)  EKG leads (Hives)    MEDICATIONS:  STANDING MEDICATIONS  acetaminophen     Tablet .. 1000 milliGRAM(s) Oral every 8 hours  buMETAnide 1 milliGRAM(s) Oral two times a day  chlorhexidine 2% Cloths 1 Application(s) Topical daily  epoetin raquel (EPOGEN) Injectable 83533 Unit(s) SubCutaneous every 7 days  folic acid 1 milliGRAM(s) Oral daily  heparin   Injectable 5000 Unit(s) SubCutaneous every 12 hours  lactulose Syrup 20 Gram(s) Oral every 6 hours  lidocaine 1%/epinephrine 1:100,000 Inj 2 Vial(s) Local Injection once  lidocaine 1%/epinephrine 1:100,000 Inj 3 Vial(s) Local Injection once  metoprolol tartrate 12.5 milliGRAM(s) Oral every 12 hours  metroNIDAZOLE    Tablet 500 milliGRAM(s) Oral every 12 hours  midodrine. 10 milliGRAM(s) Oral three times a day  pantoprazole    Tablet 40 milliGRAM(s) Oral before breakfast  polyethylene glycol 3350 17 Gram(s) Oral daily  senna 2 Tablet(s) Oral at bedtime  sodium zirconium cyclosilicate 5 Gram(s) Oral once  sucralfate 1 Gram(s) Oral every 12 hours    PRN MEDICATIONS  traMADol 50 milliGRAM(s) Oral every 6 hours PRN    VITALS:   T(F): 98.3  HR: 62  BP: 105/47  RR: 14  SpO2: 96%    PHYSICAL EXAM:  GENERAL:   ( x) NAD, lying in bed comfortably     (  ) obtunded     (  ) lethargic     (  ) somnolent    HEART:  Rate -->     (x) normal rate     (  ) bradycardic     (  ) tachycardic  Rhythm -->     (x) regular     (  ) regularly irregular     (  ) irregularly irregular  Murmurs -->     (x) normal s1s2     (  ) systolic murmur     (  ) diastolic murmur     (  ) continuous murmur      (  ) S3 present     (  ) S4 present    LUNGS:   ( x)Unlabored respirations     (  ) tachypnea  ( x) B/L air entry     (  ) decreased breath sounds in:  (location     )    ( x) no adventitious sound     (  ) crackles     (  ) wheezing      (  ) rhonchi      (specify location:       )  (  ) chest wall tenderness (specify location:       )    ABDOMEN:   ( x) Soft     (  ) tense   |   (  ) nondistended     (  ) distended   |   (  ) +BS     (  ) hypoactive bowel sounds     (  ) hyperactive bowel sounds  ( x) nontender     (  ) RUQ tenderness     (  ) RLQ tenderness     (  ) LLQ tenderness     (  ) epigastric tenderness     (  ) diffuse tenderness  (  ) Splenomegaly      (  ) Hepatomegaly      (  ) Jaundice     (  ) ecchymosis     EXTREMITIES:  ( x) Normal     (  ) Rash     (  ) ecchymosis     (  ) varicose veins      (  ) pitting edema     (  ) non-pitting edema   (  ) ulceration     (  ) gangrene:     (location:     )    NERVOUS SYSTEM:    ( x) A&Ox3     (  ) confused     (  ) lethargic      LABS:                        8.0    5.52  )-----------( 177      ( 23 Aug 2024 06:11 )             25.6         139  |  102  |  76<HH>  ----------------------------<  86  5.2<H>   |  27  |  2.6<H>    Ca    7.6<L>      23 Aug 2024 06:11  Mg     2.5         TPro  5.0<L>  /  Alb  2.4<L>  /  TBili  0.4  /  DBili  x   /  AST  22  /  ALT  7   /  AlkPhos  91        Urinalysis Basic - ( 23 Aug 2024 06:11 )    Color: x / Appearance: x / SG: x / pH: x  Gluc: 86 mg/dL / Ketone: x  / Bili: x / Urobili: x   Blood: x / Protein: x / Nitrite: x   Leuk Esterase: x / RBC: x / WBC x   Sq Epi: x / Non Sq Epi: x / Bacteria: x                RADIOLOGY:    ASSESSMENT AND PLAN  78-year-old female with past medical history of   CKD, HTN, CCY, gout,  severe AS being planned for tAVR, and history of severe anemia secondary to chronic upper GI bleeding due to  AV malformation gastric body dieulafoy lesion, weekly blood transfusions presenting to ER for a rupture pressure ulcer of the buttock area. Hospital course complicated by worsening encephalopathy s/p urgent HD, upgraded to SDU    A/P   # Worsening metabolic encephalopathy /Acute kidney inury on CKD stage 4/ rhabdomyolysis / Hyperammonemia   - mental status improved now, no indications for brain MRI  - pt started on HD ( last HD on 8/15),  nephrology is following, discussed with nephrology today who advised right femoral U dall to be removed-[place] removed   - CTAP done and reported-  No evidence of an intraperitoneal or retroperitoneal hematoma; CT evidence of anemia as above. Moderate rectal stool burden with associated rectal wall thickening and adjacent edema. Findings may reflect developing stercoral proctitis.  - patient started on Lactulose--> now having BM, goal 2-3 BM daily   - c/w Lokelma   - renal diet  - no hydro on renal/bladder sono   - c/w Bumex 1 mg Q 12 hours ( recommended by nephrology)   - if cr continues to increase will resume hd in am   - non oliguric   - supportive care     # Bilateral buttocks pressure wounds:    -  s/p bedside debridement of bilateral buttocks by Dr Schwarz   - continue local wound care bid: wash wounds with soap and water, apply hydrogel, cover with Vashe moist gauze, then apply ABD and tape.  - pain management  - offloading, position changes  - Burn OR done on : debridement of bilateral buttocks  - Follow up with burn    - c/w wound care per burn, s/p debridement on   - off load pressure points, change position Q 2 hours   - Fu Burn   - s/p Meropenam> switch to Cipro / Flagyl     # Acute on chronic  anemia/ History of AVMs (gastric and duodenal) and Dieulafoy Lesions Status Post Hemostasis in 2023/ History of perisplenic Varices in    - s/p EGD/colonoscopy/push enteroscopy on  showing AVMs and polyps  - s/p 1U pRBC on , s/p course of Venofer  - on weekly IV transfusion/iron infusions per hematology  - monitor h/h, keep active T&S  - keep Hb above 7.0     # Chronic diastolic CHF/ Severe aortic stenosis   -  TTE Echo Complete w/o Contrast w/ Doppler (24 @ 11:03) >EF of 56 %. Mild asymmetric left ventricular hypertrophy. Grade II diastolic dysfunction). Severe aortic valve stenosis , Mild to moderate mitral valve regurgitation. Moderate aortic regurgitation.  - Patient to follow up with Dr. Hernandez as an outpatient for TAVR  - c/w metoprolol, c/w  Midodrine 10 q8H  - HD as scheduled       #Progress Note Handoff  follow up with burn         24H events:    Patient is a 78y old Female who presents with a chief complaint of sacral wound (15 Aug 2024 08:25)    Primary diagnosis of Pressure ulcer      Today is 25d of hospitalization. This morning patient was seen and examined at bedside, resting comfortably in bed.    No acute or major events overnight.    Code Status:    Family communication:  Contact date:  Name of person contacted:  Relationship to patient:  Communication details:  What matters most:    PAST MEDICAL & SURGICAL HISTORY  HTN (hypertension)    Heart murmur    Eczema    Anemia  iron infusions and PRBC transfusions    Aortic stenosis    Chronic kidney disease (CKD)    2019 novel coronavirus disease (COVID-19)  2020- REHAB admission    Gastric AVM    H/O appendicitis    S/P cholecystectomy    History of esophagogastroduodenoscopy (EGD)    H/O colonoscopy    H/O  section    History of appendectomy    Port-A-Cath in place  right CW      SOCIAL HISTORY:  Social History:      ALLERGIES:  aspirin (Rash; Other)  penicillins (Rash; Urticaria; Hives; Blisters)  latex (Unknown)  EKG leads (Hives)    MEDICATIONS:  STANDING MEDICATIONS  acetaminophen     Tablet .. 1000 milliGRAM(s) Oral every 8 hours  buMETAnide 1 milliGRAM(s) Oral two times a day  chlorhexidine 2% Cloths 1 Application(s) Topical daily  epoetin raquel (EPOGEN) Injectable 82915 Unit(s) SubCutaneous every 7 days  folic acid 1 milliGRAM(s) Oral daily  heparin   Injectable 5000 Unit(s) SubCutaneous every 12 hours  lactulose Syrup 20 Gram(s) Oral every 6 hours  lidocaine 1%/epinephrine 1:100,000 Inj 2 Vial(s) Local Injection once  lidocaine 1%/epinephrine 1:100,000 Inj 3 Vial(s) Local Injection once  metoprolol tartrate 12.5 milliGRAM(s) Oral every 12 hours  metroNIDAZOLE    Tablet 500 milliGRAM(s) Oral every 12 hours  midodrine. 10 milliGRAM(s) Oral three times a day  pantoprazole    Tablet 40 milliGRAM(s) Oral before breakfast  polyethylene glycol 3350 17 Gram(s) Oral daily  senna 2 Tablet(s) Oral at bedtime  sodium zirconium cyclosilicate 5 Gram(s) Oral once  sucralfate 1 Gram(s) Oral every 12 hours    PRN MEDICATIONS  traMADol 50 milliGRAM(s) Oral every 6 hours PRN    VITALS:   T(F): 98.3  HR: 62  BP: 105/47  RR: 14  SpO2: 96%    PHYSICAL EXAM:  GENERAL:   ( x) NAD, lying in bed comfortably     (  ) obtunded     (  ) lethargic     (  ) somnolent    HEART:  Rate -->     (x) normal rate     (  ) bradycardic     (  ) tachycardic  Rhythm -->     (x) regular     (  ) regularly irregular     (  ) irregularly irregular  Murmurs -->     (x) normal s1s2     (  ) systolic murmur     (  ) diastolic murmur     (  ) continuous murmur      (  ) S3 present     (  ) S4 present    LUNGS:   ( x)Unlabored respirations     (  ) tachypnea  ( x) B/L air entry     (  ) decreased breath sounds in:  (location     )    ( x) no adventitious sound     (  ) crackles     (  ) wheezing      (  ) rhonchi      (specify location:       )  (  ) chest wall tenderness (specify location:       )    ABDOMEN:   ( x) Soft     (  ) tense   |   (  ) nondistended     (  ) distended   |   (  ) +BS     (  ) hypoactive bowel sounds     (  ) hyperactive bowel sounds  ( x) nontender     (  ) RUQ tenderness     (  ) RLQ tenderness     (  ) LLQ tenderness     (  ) epigastric tenderness     (  ) diffuse tenderness  (  ) Splenomegaly      (  ) Hepatomegaly      (  ) Jaundice     (  ) ecchymosis     EXTREMITIES:  ( x) Normal     (  ) Rash     (  ) ecchymosis     (  ) varicose veins      (  ) pitting edema     (  ) non-pitting edema   (  ) ulceration     (  ) gangrene:     (location:     )    NERVOUS SYSTEM:    ( x) A&Ox3     (  ) confused     (  ) lethargic      LABS:                        8.0    5.52  )-----------( 177      ( 23 Aug 2024 06:11 )             25.6         139  |  102  |  76<HH>  ----------------------------<  86  5.2<H>   |  27  |  2.6<H>    Ca    7.6<L>      23 Aug 2024 06:11  Mg     2.5         TPro  5.0<L>  /  Alb  2.4<L>  /  TBili  0.4  /  DBili  x   /  AST  22  /  ALT  7   /  AlkPhos  91        Urinalysis Basic - ( 23 Aug 2024 06:11 )    Color: x / Appearance: x / SG: x / pH: x  Gluc: 86 mg/dL / Ketone: x  / Bili: x / Urobili: x   Blood: x / Protein: x / Nitrite: x   Leuk Esterase: x / RBC: x / WBC x   Sq Epi: x / Non Sq Epi: x / Bacteria: x                RADIOLOGY:    ASSESSMENT AND PLAN  78-year-old female with past medical history of   CKD, HTN, CCY, gout,  severe AS being planned for tAVR, and history of severe anemia secondary to chronic upper GI bleeding due to  AV malformation gastric body dieulafoy lesion, weekly blood transfusions presenting to ER for a rupture pressure ulcer of the buttock area. Hospital course complicated by worsening encephalopathy s/p urgent HD, upgraded to SDU    A/P   # Worsening metabolic encephalopathy /Acute kidney inury on CKD stage 4/ rhabdomyolysis / Hyperammonemia   - mental status improved now, no indications for brain MRI  - pt started on HD ( last HD on 8/15),  nephrology is following, discussed with nephrology today who advised right femoral U dall to be removed-[place] removed   - CTAP done and reported-  No evidence of an intraperitoneal or retroperitoneal hematoma; CT evidence of anemia as above. Moderate rectal stool burden with associated rectal wall thickening and adjacent edema. Findings may reflect developing stercoral proctitis.  - patient started on Lactulose--> now having BM, goal 2-3 BM daily   - c/w Lokelma   - renal diet  - no hydro on renal/bladder sono   - c/w Bumex 1 mg Q 12 hours ( recommended by nephrology)   - if cr continues to increase will resume hd in am   - non oliguric   - supportive care     # Bilateral buttocks pressure wounds:    -  s/p bedside debridement of bilateral buttocks by Dr Schwarz   - continue local wound care bid: wash wounds with soap and water, apply hydrogel, cover with Vashe moist gauze, then apply ABD and tape.  - pain management  - offloading, position changes  - Burn OR done on : debridement of bilateral buttocks  - Follow up with burn    - c/w wound care per burn, s/p debridement on   - off load pressure points, change position Q 2 hours   - Fu Burn   - s/p Meropenem => switch to Cipro / Flagyl   - : QTc prolonged to 506, spoke to ID => discontinue cipro    # Acute on chronic  anemia/ History of AVMs (gastric and duodenal) and Dieulafoy Lesions Status Post Hemostasis in / History of perisplenic Varices in    - s/p EGD/colonoscopy/push enteroscopy on  showing AVMs and polyps  - s/p 1U pRBC on , s/p course of Venofer  - on weekly IV transfusion/iron infusions per hematology  - monitor h/h, keep active T&S  - keep Hb above 7.0     # Chronic diastolic CHF/ Severe aortic stenosis   -  TTE Echo Complete w/o Contrast w/ Doppler (24 @ 11:03) >EF of 56 %. Mild asymmetric left ventricular hypertrophy. Grade II diastolic dysfunction). Severe aortic valve stenosis , Mild to moderate mitral valve regurgitation. Moderate aortic regurgitation.  - Patient to follow up with Dr. Hernandez as an outpatient for TAVR  - c/w metoprolol, c/w  Midodrine 10 q8H  - HD as scheduled       #Progress Note Handoff  follow up with burn

## 2024-08-23 NOTE — BRIEF OPERATIVE NOTE - NSICDXBRIEFPOSTOP_GEN_ALL_CORE_FT
POST-OP DIAGNOSIS:  Wound 14-Aug-2024 15:53:50  David cShwarz  
POST-OP DIAGNOSIS:  Wound 14-Aug-2024 15:53:50  David Schwarz

## 2024-08-23 NOTE — PROGRESS NOTE ADULT - ATTENDING COMMENTS
78-year-old female with past medical history of   CKD, HTN, CCY, gout,  severe AS being planned for tAVR, and history of severe anemia secondary to chronic upper GI bleeding due to  AV malformation gastric body dieulafoy lesion, weekly blood transfusions presenting to ER for a rupture pressure ulcer of the buttock area. Admitted for metabolic encephalopathy 2/2 uremia vs hyperammonemia, started on HD.  Mental status improved. now currently monitoring off HD.        # Metabolic encephalopathy - resolved   # Acute kidney injury on CKD stage 4  # Hyperammonemia - per GI, not hepatic encephalopathy   - mental status improved now, no need for brain MRI  - currently monitoring off HD, [place] removed   - on Lactulose   - c/w Bumex 1 mg Q 12 hours     # Sacrum: pressure ulcer with necrosis/ localized abscess/cellulitis  - 8/14 s/p bedside debridement of bilateral buttocks by Dr Schwarz   - continue local wound care bid: wash wounds with soap and water, apply hydrogel, cover with Vashe moist gauze, then apply ABD and tape.  - pain management - Tylenol ATC with tramadol PRN   - offloading, position changes  - Planned for debridement in OR with Dr Schwarz in AM   - on  flagyl - cipro discontinued per ID due to prolonged QTc     # Asymptomatic bacteruria with ESBL Enterobacter with culver catheter  - s/p 6 days of meropenem     # Prolonged QTc   - repeat EKG daily   - K above 4, mg above 2.2   - avoid QTc prolonging meds     # Acute on chronic  anemia/ History of AVMs (gastric and duodenal) and Dieulafoy Lesions Status Post Hemostasis in 2023/ History of perisplenic Varices in 2023   - s/p EGD/colonoscopy/push enteroscopy on 7/22 showing AVMs and polyps  - s/p 1U pRBC on 8/2, s/p course of Venofer  - on weekly IV transfusion/iron infusions per hematology  - monitor h/h, keep active T&S  - keep Hb above 7.0     # Chronic diastolic CHF/ Severe aortic stenosis   -  TTE Echo Complete w/o Contrast w/ Doppler (08.01.24 @ 11:03) >EF of 56 %. Mild asymmetric left ventricular hypertrophy. Grade II diastolic dysfunction). Severe aortic valve stenosis. Mild to moderate mitral valve regurgitation. Moderate aortic regurgitation.  - Patient to follow up with Dr. Hernandez as an outpatient for TAVR  - c/w metoprolol, c/w  Midodrine 10 q8H    DVT ppx     #Progress Note Handoff  Pending (specify): Debridement with burn today, monitor   Plan of care d/w son at bedside in length 8/22  Dispo: SNF

## 2024-08-23 NOTE — BRIEF OPERATIVE NOTE - NSICDXBRIEFPREOP_GEN_ALL_CORE_FT
PRE-OP DIAGNOSIS:  Wound 14-Aug-2024 15:53:35  David Schwarz  
PRE-OP DIAGNOSIS:  Wound 14-Aug-2024 15:53:35  David Schwarz

## 2024-08-23 NOTE — PROGRESS NOTE ADULT - SUBJECTIVE AND OBJECTIVE BOX
Nephrology progress note    Patient was seen and examined, events over the last 24 h noted .    Allergies:  aspirin (Rash; Other)  penicillins (Rash; Urticaria; Hives; Blisters)  latex (Unknown)  EKG leads (Hives)    Hospital Medications:   MEDICATIONS  (STANDING):  lidocaine 1%/epinephrine 1:100,000 Inj 3 Vial(s) Local Injection once  sodium zirconium cyclosilicate 5 Gram(s) Oral once        VITALS:  T(F): 98 (08-23-24 @ 09:00), Max: 98.4 (08-22-24 @ 23:57)  HR: 65 (08-23-24 @ 09:15)  BP: 104/62 (08-23-24 @ 09:15)  RR: 16 (08-23-24 @ 09:15)  SpO2: 94% (08-23-24 @ 09:15)  Wt(kg): --    08-21 @ 07:01  -  08-22 @ 07:00  --------------------------------------------------------  IN: 0 mL / OUT: 1400 mL / NET: -1400 mL    08-22 @ 07:01  -  08-23 @ 07:00  --------------------------------------------------------  IN: 0 mL / OUT: 400 mL / NET: -400 mL      Height (cm): 170.2 (08-23 @ 09:15)  Weight (kg): 101 (08-23 @ 09:15)  BMI (kg/m2): 34.9 (08-23 @ 09:15)  BSA (m2): 2.12 (08-23 @ 09:15)    PHYSICAL EXAM:  Constitutional: NAD  HEENT: anicteric sclera, oropharynx clear, MMM  Neck: No JVD  Respiratory: CTAB, no wheezes, rales or rhonchi  Cardiovascular: S1, S2, RRR  Gastrointestinal: BS+, soft, NT/ND  Extremities: No cyanosis or clubbing. No peripheral edema  :  No culver.   Skin: No rashes    LABS:  08-23    139  |  102  |  76<HH>  ----------------------------<  86  5.2<H>   |  27  |  2.6<H>    Ca    7.6<L>      23 Aug 2024 06:11  Mg     2.5     08-23    TPro  5.0<L>  /  Alb  2.4<L>  /  TBili  0.4  /  DBili      /  AST  22  /  ALT  7   /  AlkPhos  91  08-23                          8.0    5.52  )-----------( 177      ( 23 Aug 2024 06:11 )             25.6       Urine Studies:  Urinalysis Basic - ( 23 Aug 2024 06:11 )    Color:  / Appearance:  / SG:  / pH:   Gluc: 86 mg/dL / Ketone:   / Bili:  / Urobili:    Blood:  / Protein:  / Nitrite:    Leuk Esterase:  / RBC:  / WBC    Sq Epi:  / Non Sq Epi:  / Bacteria:         RADIOLOGY & ADDITIONAL STUDIES:

## 2024-08-23 NOTE — PROGRESS NOTE ADULT - ASSESSMENT
78-year-old female with past medical history of   CKD, HTN, CCY, gout,  severe AS being planned for TAVR, and history of severe anemia secondary to chronic upper GI bleeding due to  AV malformation gastric body dieulafoy lesion, weekly blood transfusions presenting to ER for a rupture pressure ulcer of the buttock area.    #EVAN on CKD4/ anemia   #AMS  - creat noted at baseline from 4/2024 stable at mid 2's  - no HD for now last HD was 8/15   needs Bumex 2 mg po or IV daily, cont Midodrine  If creat cont to drop - can remove HD catheter   - no hydro on renal/bladder sono   - cont WOODY wkly, Tsat low/ no venofer on atb   -  on atb followed by ID /can do merrem 1 g q12h   - cardiology noted appreciated   - seen by burn akshatment today  - overall prognosis poor   will follow

## 2024-08-24 LAB
ALBUMIN SERPL ELPH-MCNC: 2.4 G/DL — LOW (ref 3.5–5.2)
ALP SERPL-CCNC: 85 U/L — SIGNIFICANT CHANGE UP (ref 30–115)
ALT FLD-CCNC: 6 U/L — SIGNIFICANT CHANGE UP (ref 0–41)
ANION GAP SERPL CALC-SCNC: 9 MMOL/L — SIGNIFICANT CHANGE UP (ref 7–14)
AST SERPL-CCNC: 22 U/L — SIGNIFICANT CHANGE UP (ref 0–41)
BASOPHILS # BLD AUTO: 0.01 K/UL — SIGNIFICANT CHANGE UP (ref 0–0.2)
BASOPHILS NFR BLD AUTO: 0.2 % — SIGNIFICANT CHANGE UP (ref 0–1)
BILIRUB SERPL-MCNC: 0.5 MG/DL — SIGNIFICANT CHANGE UP (ref 0.2–1.2)
BUN SERPL-MCNC: 79 MG/DL — CRITICAL HIGH (ref 10–20)
CALCIUM SERPL-MCNC: 7.6 MG/DL — LOW (ref 8.4–10.4)
CHLORIDE SERPL-SCNC: 101 MMOL/L — SIGNIFICANT CHANGE UP (ref 98–110)
CO2 SERPL-SCNC: 27 MMOL/L — SIGNIFICANT CHANGE UP (ref 17–32)
CREAT SERPL-MCNC: 2.5 MG/DL — HIGH (ref 0.7–1.5)
EGFR: 19 ML/MIN/1.73M2 — LOW
EOSINOPHIL # BLD AUTO: 0.24 K/UL — SIGNIFICANT CHANGE UP (ref 0–0.7)
EOSINOPHIL NFR BLD AUTO: 5 % — SIGNIFICANT CHANGE UP (ref 0–8)
GLUCOSE SERPL-MCNC: 83 MG/DL — SIGNIFICANT CHANGE UP (ref 70–99)
HCT VFR BLD CALC: 25.3 % — LOW (ref 37–47)
HGB BLD-MCNC: 7.7 G/DL — LOW (ref 12–16)
IMM GRANULOCYTES NFR BLD AUTO: 0.6 % — HIGH (ref 0.1–0.3)
LYMPHOCYTES # BLD AUTO: 0.78 K/UL — LOW (ref 1.2–3.4)
LYMPHOCYTES # BLD AUTO: 16.2 % — LOW (ref 20.5–51.1)
MAGNESIUM SERPL-MCNC: 2.4 MG/DL — SIGNIFICANT CHANGE UP (ref 1.8–2.4)
MCHC RBC-ENTMCNC: 30.4 G/DL — LOW (ref 32–37)
MCHC RBC-ENTMCNC: 30.8 PG — SIGNIFICANT CHANGE UP (ref 27–31)
MCV RBC AUTO: 101.2 FL — HIGH (ref 81–99)
MONOCYTES # BLD AUTO: 0.54 K/UL — SIGNIFICANT CHANGE UP (ref 0.1–0.6)
MONOCYTES NFR BLD AUTO: 11.2 % — HIGH (ref 1.7–9.3)
NEUTROPHILS # BLD AUTO: 3.21 K/UL — SIGNIFICANT CHANGE UP (ref 1.4–6.5)
NEUTROPHILS NFR BLD AUTO: 66.8 % — SIGNIFICANT CHANGE UP (ref 42.2–75.2)
NIGHT BLUE STAIN TISS: SIGNIFICANT CHANGE UP
NRBC # BLD: 0 /100 WBCS — SIGNIFICANT CHANGE UP (ref 0–0)
PLATELET # BLD AUTO: 180 K/UL — SIGNIFICANT CHANGE UP (ref 130–400)
PMV BLD: 9.5 FL — SIGNIFICANT CHANGE UP (ref 7.4–10.4)
POTASSIUM SERPL-MCNC: 4.6 MMOL/L — SIGNIFICANT CHANGE UP (ref 3.5–5)
POTASSIUM SERPL-SCNC: 4.6 MMOL/L — SIGNIFICANT CHANGE UP (ref 3.5–5)
PROT SERPL-MCNC: 4.7 G/DL — LOW (ref 6–8)
RBC # BLD: 2.5 M/UL — LOW (ref 4.2–5.4)
RBC # FLD: 18.8 % — HIGH (ref 11.5–14.5)
SODIUM SERPL-SCNC: 137 MMOL/L — SIGNIFICANT CHANGE UP (ref 135–146)
SPECIMEN SOURCE: SIGNIFICANT CHANGE UP
WBC # BLD: 4.81 K/UL — SIGNIFICANT CHANGE UP (ref 4.8–10.8)
WBC # FLD AUTO: 4.81 K/UL — SIGNIFICANT CHANGE UP (ref 4.8–10.8)

## 2024-08-24 PROCEDURE — 99232 SBSQ HOSP IP/OBS MODERATE 35: CPT

## 2024-08-24 RX ORDER — OXYCODONE HYDROCHLORIDE 5 MG/1
5 TABLET ORAL EVERY 8 HOURS
Refills: 0 | Status: DISCONTINUED | OUTPATIENT
Start: 2024-08-24 | End: 2024-08-25

## 2024-08-24 RX ORDER — HYDROMORPHONE HYDROCHLORIDE 2 MG/1
0.5 TABLET ORAL EVERY 4 HOURS
Refills: 0 | Status: DISCONTINUED | OUTPATIENT
Start: 2024-08-24 | End: 2024-08-28

## 2024-08-24 RX ADMIN — Medication 40 MILLIGRAM(S): at 06:29

## 2024-08-24 RX ADMIN — OXYCODONE HYDROCHLORIDE 5 MILLIGRAM(S): 5 TABLET ORAL at 17:32

## 2024-08-24 RX ADMIN — OXYCODONE HYDROCHLORIDE 5 MILLIGRAM(S): 5 TABLET ORAL at 17:23

## 2024-08-24 RX ADMIN — Medication 20 GRAM(S): at 06:29

## 2024-08-24 RX ADMIN — Medication 5000 UNIT(S): at 06:27

## 2024-08-24 RX ADMIN — Medication 20 GRAM(S): at 17:25

## 2024-08-24 RX ADMIN — METOPROLOL TARTRATE 12.5 MILLIGRAM(S): 100 TABLET ORAL at 06:27

## 2024-08-24 RX ADMIN — Medication 500 MILLIGRAM(S): at 06:29

## 2024-08-24 RX ADMIN — Medication 2 TABLET(S): at 21:27

## 2024-08-24 RX ADMIN — OXYCODONE HYDROCHLORIDE 5 MILLIGRAM(S): 5 TABLET ORAL at 21:27

## 2024-08-24 RX ADMIN — Medication 20 GRAM(S): at 00:04

## 2024-08-24 RX ADMIN — CHLORHEXIDINE GLUCONATE 1 APPLICATION(S): 40 SOLUTION TOPICAL at 11:25

## 2024-08-24 RX ADMIN — SUCRALFATE 1 GRAM(S): 1 SUSPENSION ORAL at 06:29

## 2024-08-24 RX ADMIN — Medication 500 MILLIGRAM(S): at 17:24

## 2024-08-24 RX ADMIN — MIDODRINE HYDROCHLORIDE 10 MILLIGRAM(S): 5 TABLET ORAL at 11:26

## 2024-08-24 RX ADMIN — Medication 20 GRAM(S): at 11:26

## 2024-08-24 RX ADMIN — ACETAMINOPHEN 1000 MILLIGRAM(S): 325 TABLET ORAL at 07:20

## 2024-08-24 RX ADMIN — MIDODRINE HYDROCHLORIDE 10 MILLIGRAM(S): 5 TABLET ORAL at 17:24

## 2024-08-24 RX ADMIN — METOPROLOL TARTRATE 12.5 MILLIGRAM(S): 100 TABLET ORAL at 17:23

## 2024-08-24 RX ADMIN — POLYETHYLENE GLYCOL 3350 17 GRAM(S): 17 POWDER, FOR SOLUTION ORAL at 11:25

## 2024-08-24 RX ADMIN — ACETAMINOPHEN 1000 MILLIGRAM(S): 325 TABLET ORAL at 06:29

## 2024-08-24 RX ADMIN — ACETAMINOPHEN 1000 MILLIGRAM(S): 325 TABLET ORAL at 13:13

## 2024-08-24 RX ADMIN — Medication 20 GRAM(S): at 23:44

## 2024-08-24 RX ADMIN — TRAMADOL HYDROCHLORIDE 50 MILLIGRAM(S): 200 TABLET, EXTENDED RELEASE ORAL at 10:42

## 2024-08-24 RX ADMIN — OXYCODONE HYDROCHLORIDE 5 MILLIGRAM(S): 5 TABLET ORAL at 22:27

## 2024-08-24 RX ADMIN — TRAMADOL HYDROCHLORIDE 50 MILLIGRAM(S): 200 TABLET, EXTENDED RELEASE ORAL at 09:24

## 2024-08-24 RX ADMIN — BUMETANIDE 1 MILLIGRAM(S): 2 TABLET ORAL at 06:29

## 2024-08-24 RX ADMIN — Medication 1 MILLIGRAM(S): at 11:26

## 2024-08-24 RX ADMIN — ACETAMINOPHEN 1000 MILLIGRAM(S): 325 TABLET ORAL at 00:04

## 2024-08-24 RX ADMIN — SUCRALFATE 1 GRAM(S): 1 SUSPENSION ORAL at 17:24

## 2024-08-24 RX ADMIN — ACETAMINOPHEN 1000 MILLIGRAM(S): 325 TABLET ORAL at 14:20

## 2024-08-24 RX ADMIN — BUMETANIDE 1 MILLIGRAM(S): 2 TABLET ORAL at 13:13

## 2024-08-24 RX ADMIN — Medication 5000 UNIT(S): at 17:24

## 2024-08-24 RX ADMIN — ACETAMINOPHEN 1000 MILLIGRAM(S): 325 TABLET ORAL at 22:27

## 2024-08-24 RX ADMIN — ACETAMINOPHEN 1000 MILLIGRAM(S): 325 TABLET ORAL at 21:27

## 2024-08-24 NOTE — PROGRESS NOTE ADULT - ASSESSMENT
78-year-old female with past medical history of   CKD, HTN, CCY, gout,  severe AS being planned for TAVR, and history of severe anemia secondary to chronic upper GI bleeding due to  AV malformation gastric body dieulafoy lesion, weekly blood transfusions presenting to ER for a rupture pressure ulcer of the buttock area.  #EVAN on CKD4/ anemia   #AMS  - creat noted at baseline from 4/2024 stable at mid 2's  - no  need for HD for now last HD was 8/15  - continue bumex  - no hydro on renal/bladder sono   - cont WOODY wkly, Tsat low/ no venofer on atb   -  on atb followed by ID /can do merrem 1 g q12h   - followed by burn   - check ph level   - bp noted / continue midodrine   - overall prognosis poor   will follow

## 2024-08-24 NOTE — PROGRESS NOTE ADULT - ASSESSMENT
78-year-old female with past medical history of   CKD, HTN, CCY, gout,  severe AS being planned for tAVR, and history of severe anemia secondary to chronic upper GI bleeding due to  AV malformation gastric body dieulafoy lesion, weekly blood transfusions presenting to ER for a rupture pressure ulcer of the buttock area. Admitted for metabolic encephalopathy 2/2 uremia vs hyperammonemia, started on HD.  Mental status improved. now currently monitoring off HD.        # Metabolic encephalopathy - resolved   # Acute kidney injury on CKD stage 4  # Hyperammonemia - per GI, not hepatic encephalopathy   - mental status improved now, no need for brain MRI  - currently monitoring off HD, [place] removed   - on Lactulose   - c/w Bumex 1 mg Q 12 hours     # Sacrum: pressure ulcer with necrosis/ localized abscess/cellulitis  - 8/14 s/p bedside debridement of bilateral buttocks by Dr Schwarz   - s/p debridement in OR by BURN 8/23 - cleared by BURN for discharge   - continue local wound care bid: wash wounds with soap and water, apply hydrogel, cover with Vashe moist gauze, then apply ABD and tape.  - pain management - Tylenol ATC, Oxycodone IR 5mg q8h ATC, Dilaudid PRN   - offloading, position changes  - on  flagyl - cipro discontinued per ID due to prolonged QTc     # Asymptomatic bacteruria with ESBL Enterobacter with culver catheter  - s/p 6 days of meropenem     # Prolonged QTc   - repeat EKG daily   - K above 4, mg above 2.2   - avoid QTc prolonging meds     # Acute on chronic  anemia/ History of AVMs (gastric and duodenal) and Dieulafoy Lesions Status Post Hemostasis in 2023/ History of perisplenic Varices in 2023   - s/p EGD/colonoscopy/push enteroscopy on 7/22 showing AVMs and polyps  - s/p 1U pRBC on 8/2, s/p course of Venofer  - on weekly IV transfusion/iron infusions per hematology  - monitor h/h, keep active T&S  - keep Hb above 7.0     # Chronic diastolic CHF/ Severe aortic stenosis   - TTE Echo Complete w/o Contrast w/ Doppler (08.01.24 @ 11:03) >EF of 56 %. Mild asymmetric left ventricular hypertrophy. Grade II diastolic dysfunction). Severe aortic valve stenosis. Mild to moderate mitral valve regurgitation. Moderate aortic regurgitation.  - Patient to follow up with Dr. Hernandez as an outpatient for TAVR  - c/w metoprolol, c/w midodrine 10 q8H    DVT ppx     #Progress Note Handoff  Pending (specify): ID f/u, pain control, nephro f/u, SNF placement

## 2024-08-24 NOTE — PROGRESS NOTE ADULT - ASSESSMENT
78-year-old female with past medical history of   CKD, HTN, CCY, gout,  severe AS being planned for tAVR, and history of severe anemia secondary to chronic upper GI bleeding due to  AV malformation gastric body dieulafoy lesion, weekly blood transfusions presenting to ER for pressure ulcers of the buttock area.     # Bilateral buttocks pressure wounds:    - No further surgical intervention, dressing changes only   - continue local wound care bid: wash wounds with soap and water, pack wound with Vashe moist gauze, then apply ABD and tape.  - pain management  - offloading, position changes  - frequent turning  - F/u wound cultures  - IV antibiotics as per ID  - upon discharge may follow up with [facility]  - rest of care as per primary team

## 2024-08-24 NOTE — PROGRESS NOTE ADULT - SUBJECTIVE AND OBJECTIVE BOX
seen and examined  24 h events noted  no distress         PAST HISTORY  --------------------------------------------------------------------------------  No significant changes to PMH, PSH, FHx, SHx, unless otherwise noted    ALLERGIES & MEDICATIONS  --------------------------------------------------------------------------------  Allergies    aspirin (Rash; Other)  penicillins (Rash; Urticaria; Hives; Blisters)  latex (Unknown)  EKG leads (Hives)    Intolerances      Standing Inpatient Medications  acetaminophen     Tablet .. 1000 milliGRAM(s) Oral every 8 hours  buMETAnide 1 milliGRAM(s) Oral two times a day  chlorhexidine 2% Cloths 1 Application(s) Topical daily  epoetin raquel (EPOGEN) Injectable 67235 Unit(s) SubCutaneous every 7 days  folic acid 1 milliGRAM(s) Oral daily  heparin   Injectable 5000 Unit(s) SubCutaneous every 12 hours  lactulose Syrup 20 Gram(s) Oral every 6 hours  metoprolol tartrate 12.5 milliGRAM(s) Oral every 12 hours  metroNIDAZOLE    Tablet 500 milliGRAM(s) Oral every 12 hours  midodrine. 10 milliGRAM(s) Oral three times a day  pantoprazole    Tablet 40 milliGRAM(s) Oral before breakfast  polyethylene glycol 3350 17 Gram(s) Oral daily  senna 2 Tablet(s) Oral at bedtime  sucralfate 1 Gram(s) Oral every 12 hours    PRN Inpatient Medications  traMADol 50 milliGRAM(s) Oral every 6 hours PRN        VITALS/PHYSICAL EXAM  --------------------------------------------------------------------------------  T(C): 36.7 (08-24-24 @ 07:22), Max: 36.9 (08-24-24 @ 00:32)  HR: 66 (08-24-24 @ 07:22) (61 - 76)  BP: 93/54 (08-24-24 @ 07:22) (93/54 - 118/52)  RR: 18 (08-24-24 @ 07:22) (14 - 18)  SpO2: 93% (08-24-24 @ 00:32) (91% - 96%)  Wt(kg): --  Height (cm): 170.2 (08-23-24 @ 09:15)  Weight (kg): 101 (08-23-24 @ 09:15)  BMI (kg/m2): 34.9 (08-23-24 @ 09:15)  BSA (m2): 2.12 (08-23-24 @ 09:15)      08-23-24 @ 07:01  -  08-24-24 @ 07:00  --------------------------------------------------------  IN: 0 mL / OUT: 700 mL / NET: -700 mL      Physical Exam:  	Gen: NAD  	Pulm: decrease BS  B/L  	CV: S1S2; no rub  	Abd: +distended  	LE:  edema      LABS/STUDIES  --------------------------------------------------------------------------------              7.7    4.81  >-----------<  180      [08-24-24 @ 07:46]              25.3     137  |  101  |  79  ----------------------------<  83      [08-24-24 @ 07:46]  4.6   |  27  |  2.5        Ca     7.6     [08-24-24 @ 07:46]      Mg     2.4     [08-24-24 @ 07:46]    TPro  4.7  /  Alb  2.4  /  TBili  0.5  /  DBili  x   /  AST  22  /  ALT  6   /  AlkPhos  85  [08-24-24 @ 07:46]          Creatinine Trend:  SCr 2.5 [08-24 @ 07:46]  SCr 2.6 [08-23 @ 06:11]  SCr 2.6 [08-22 @ 07:08]  SCr 3.0 [08-20 @ 22:11]  SCr 3.0 [08-20 @ 07:40]    Urinalysis - [08-24-24 @ 07:46]      Color  / Appearance  / SG  / pH       Gluc 83 / Ketone   / Bili  / Urobili        Blood  / Protein  / Leuk Est  / Nitrite       RBC  / WBC  / Hyaline  / Gran  / Sq Epi  / Non Sq Epi  / Bacteria       Iron 34, TIBC 150, %sat 23      [08-15-24 @ 10:36]  Ferritin 675      [08-15-24 @ 10:36]  TSH 4.36      [08-08-24 @ 09:40]    HBsAb Nonreact      [08-11-24 @ 11:39]  HBsAg Nonreact      [08-11-24 @ 11:39]  HBcAb Nonreact      [08-11-24 @ 11:39]

## 2024-08-24 NOTE — PROGRESS NOTE ADULT - SUBJECTIVE AND OBJECTIVE BOX
Pt seen at bedside for bilateral buttocks wounds follow up.  POD#1 s/p debridement of bilateral buttocks    AM rounds  No acute events overnight   Pt afebrile  No further surgical intervention, dressing changes only    Vital Signs Last 24 Hrs  T(C): 36.7 (24 Aug 2024 10:58), Max: 36.9 (24 Aug 2024 00:32)  T(F): 98 (24 Aug 2024 10:58), Max: 98.5 (24 Aug 2024 00:32)  HR: 74 (24 Aug 2024 10:58) (66 - 76)  BP: 97/60 (24 Aug 2024 10:58) (93/54 - 118/52)  BP(mean): 75 (23 Aug 2024 15:45) (75 - 75)  RR: 18 (24 Aug 2024 10:58) (17 - 18)  SpO2: 93% (24 Aug 2024 00:32) (91% - 93%)    Parameters below as of 24 Aug 2024 00:32  Patient On (Oxygen Delivery Method): room air                          7.7    4.81  )-----------( 180      ( 24 Aug 2024 07:46 )             25.3     PE:  Gen: pt lying in bed in NAD, c/o burning to the wounds  Skin:  bilateral buttocks with full thickness and partial thickness wounds, s/p debridement of bilateral buttock wounds,  Left buttock wound base pink, no pus drainage, no bleeding noted, serosanguineous discharge noted;   Right buttock wound base pink, no pus drainage, no bleeding noted, serous discharge present  Scatter fat necrosis noted  Dressing done, pt tolerated well.

## 2024-08-24 NOTE — PROGRESS NOTE ADULT - SUBJECTIVE AND OBJECTIVE BOX
Pt seen and examined at bedside.  Reports pain at sacral ulcer.         VITAL SIGNS (Last 24 hrs):  T(C): 36.7 (08-24-24 @ 15:59), Max: 36.9 (08-24-24 @ 00:32)  HR: 80 (08-24-24 @ 15:59) (66 - 80)  BP: 112/60 (08-24-24 @ 15:59) (93/54 - 115/58)  RR: 18 (08-24-24 @ 15:59) (18 - 18)  SpO2: 93% (08-24-24 @ 00:32) (93% - 93%)  Wt(kg): --  Daily Height in cm: 170.18 (24 Aug 2024 12:25)    Daily     I&O's Summary    23 Aug 2024 07:01  -  24 Aug 2024 07:00  --------------------------------------------------------  IN: 0 mL / OUT: 700 mL / NET: -700 mL    24 Aug 2024 07:01  -  24 Aug 2024 16:42  --------------------------------------------------------  IN: 0 mL / OUT: 550 mL / NET: -550 mL        PHYSICAL EXAM:  GENERAL: NAD, obese   HEAD:  Atraumatic, Normocephalic  EYES: conjunctiva and sclera clear  NECK: Supple, No JVD  CHEST/LUNG: Clear to auscultation bilaterally; No wheeze  HEART: Regular rate and rhythm; No murmurs, rubs, or gallops  ABDOMEN: Soft, Nontender, Nondistended; Bowel sounds present  EXTREMITIES:  2+ Peripheral Pulses, No clubbing, cyanosis + b/l LE edema  PSYCH: AAOx3  NEUROLOGY: non-focal  SKIN: sacral ulcer     Labs Reviewed       CBC Full  -  ( 24 Aug 2024 07:46 )  WBC Count : 4.81 K/uL  Hemoglobin : 7.7 g/dL  Hematocrit : 25.3 %  Platelet Count - Automated : 180 K/uL  Mean Cell Volume : 101.2 fL  Mean Cell Hemoglobin : 30.8 pg  Mean Cell Hemoglobin Concentration : 30.4 g/dL  Auto Neutrophil # : 3.21 K/uL  Auto Lymphocyte # : 0.78 K/uL  Auto Monocyte # : 0.54 K/uL  Auto Eosinophil # : 0.24 K/uL  Auto Basophil # : 0.01 K/uL  Auto Neutrophil % : 66.8 %  Auto Lymphocyte % : 16.2 %  Auto Monocyte % : 11.2 %  Auto Eosinophil % : 5.0 %  Auto Basophil % : 0.2 %    BMP:    08-24 @ 07:46    Blood Urea Nitrogen - 79  Calcium - 7.6  Carbond Dioxide - 27  Chloride - 101  Creatinine - 2.5  Glucose - 83  Potassium - 4.6  Sodium - 137      Hemoglobin A1c -   PT/INR - ( 20 Aug 2024 22:11 )   PT: 10.90 sec;   INR: 0.96 ratio         PTT - ( 20 Aug 2024 22:11 )  PTT:34.7 sec  Urine Culture:  08-23 @ 11:05 Urine culture: --    Culture Results: --  Method Type: --  Organism: --  Organism Identification: --  Specimen Source: .Other None            MEDICATIONS  (STANDING):  acetaminophen     Tablet .. 1000 milliGRAM(s) Oral every 8 hours  buMETAnide 1 milliGRAM(s) Oral two times a day  chlorhexidine 2% Cloths 1 Application(s) Topical daily  epoetin raquel (EPOGEN) Injectable 59778 Unit(s) SubCutaneous every 7 days  folic acid 1 milliGRAM(s) Oral daily  heparin   Injectable 5000 Unit(s) SubCutaneous every 12 hours  lactulose Syrup 20 Gram(s) Oral every 6 hours  metoprolol tartrate 12.5 milliGRAM(s) Oral every 12 hours  metroNIDAZOLE    Tablet 500 milliGRAM(s) Oral every 12 hours  midodrine. 10 milliGRAM(s) Oral three times a day  oxyCODONE    IR 5 milliGRAM(s) Oral every 8 hours  pantoprazole    Tablet 40 milliGRAM(s) Oral before breakfast  polyethylene glycol 3350 17 Gram(s) Oral daily  senna 2 Tablet(s) Oral at bedtime  sucralfate 1 Gram(s) Oral every 12 hours    MEDICATIONS  (PRN):  HYDROmorphone  Injectable 0.5 milliGRAM(s) IV Push every 4 hours PRN Severe Pain (7 - 10)

## 2024-08-25 PROCEDURE — 99232 SBSQ HOSP IP/OBS MODERATE 35: CPT

## 2024-08-25 PROCEDURE — 93010 ELECTROCARDIOGRAM REPORT: CPT

## 2024-08-25 RX ADMIN — MIDODRINE HYDROCHLORIDE 10 MILLIGRAM(S): 5 TABLET ORAL at 11:29

## 2024-08-25 RX ADMIN — Medication 500 MILLIGRAM(S): at 06:17

## 2024-08-25 RX ADMIN — Medication 40 MILLIGRAM(S): at 06:17

## 2024-08-25 RX ADMIN — Medication 20 GRAM(S): at 23:21

## 2024-08-25 RX ADMIN — ACETAMINOPHEN 1000 MILLIGRAM(S): 325 TABLET ORAL at 13:14

## 2024-08-25 RX ADMIN — Medication 2 TABLET(S): at 21:01

## 2024-08-25 RX ADMIN — OXYCODONE HYDROCHLORIDE 5 MILLIGRAM(S): 5 TABLET ORAL at 06:16

## 2024-08-25 RX ADMIN — Medication 5000 UNIT(S): at 06:16

## 2024-08-25 RX ADMIN — MIDODRINE HYDROCHLORIDE 10 MILLIGRAM(S): 5 TABLET ORAL at 17:27

## 2024-08-25 RX ADMIN — HYDROMORPHONE HYDROCHLORIDE 0.5 MILLIGRAM(S): 2 TABLET ORAL at 22:00

## 2024-08-25 RX ADMIN — Medication 20 GRAM(S): at 17:30

## 2024-08-25 RX ADMIN — ACETAMINOPHEN 1000 MILLIGRAM(S): 325 TABLET ORAL at 21:01

## 2024-08-25 RX ADMIN — ACETAMINOPHEN 1000 MILLIGRAM(S): 325 TABLET ORAL at 14:46

## 2024-08-25 RX ADMIN — Medication 20 GRAM(S): at 06:16

## 2024-08-25 RX ADMIN — SUCRALFATE 1 GRAM(S): 1 SUSPENSION ORAL at 17:30

## 2024-08-25 RX ADMIN — ACETAMINOPHEN 1000 MILLIGRAM(S): 325 TABLET ORAL at 06:17

## 2024-08-25 RX ADMIN — POLYETHYLENE GLYCOL 3350 17 GRAM(S): 17 POWDER, FOR SOLUTION ORAL at 11:29

## 2024-08-25 RX ADMIN — CHLORHEXIDINE GLUCONATE 1 APPLICATION(S): 40 SOLUTION TOPICAL at 13:48

## 2024-08-25 RX ADMIN — ACETAMINOPHEN 1000 MILLIGRAM(S): 325 TABLET ORAL at 22:00

## 2024-08-25 RX ADMIN — HYDROMORPHONE HYDROCHLORIDE 0.5 MILLIGRAM(S): 2 TABLET ORAL at 21:01

## 2024-08-25 RX ADMIN — BUMETANIDE 1 MILLIGRAM(S): 2 TABLET ORAL at 06:17

## 2024-08-25 RX ADMIN — BUMETANIDE 1 MILLIGRAM(S): 2 TABLET ORAL at 13:13

## 2024-08-25 RX ADMIN — Medication 5000 UNIT(S): at 17:30

## 2024-08-25 RX ADMIN — METOPROLOL TARTRATE 12.5 MILLIGRAM(S): 100 TABLET ORAL at 06:16

## 2024-08-25 RX ADMIN — METOPROLOL TARTRATE 12.5 MILLIGRAM(S): 100 TABLET ORAL at 17:30

## 2024-08-25 RX ADMIN — Medication 20 GRAM(S): at 11:30

## 2024-08-25 RX ADMIN — SUCRALFATE 1 GRAM(S): 1 SUSPENSION ORAL at 06:17

## 2024-08-25 RX ADMIN — Medication 1 MILLIGRAM(S): at 11:30

## 2024-08-25 NOTE — PROGRESS NOTE ADULT - SUBJECTIVE AND OBJECTIVE BOX
Nephrology progress note    THIS IS AN INCOMPLETE NOTE . FULL NOTE TO FOLLOW SHORTLY    Patient is seen and examined, events over the last 24 h noted .    Allergies:  aspirin (Rash; Other)  penicillins (Rash; Urticaria; Hives; Blisters)  latex (Unknown)  EKG leads (Hives)    Hospital Medications:   MEDICATIONS  (STANDING):  acetaminophen     Tablet .. 1000 milliGRAM(s) Oral every 8 hours  buMETAnide 1 milliGRAM(s) Oral two times a day  chlorhexidine 2% Cloths 1 Application(s) Topical daily  epoetin raquel (EPOGEN) Injectable 32008 Unit(s) SubCutaneous every 7 days  folic acid 1 milliGRAM(s) Oral daily  heparin   Injectable 5000 Unit(s) SubCutaneous every 12 hours  lactulose Syrup 20 Gram(s) Oral every 6 hours  metoprolol tartrate 12.5 milliGRAM(s) Oral every 12 hours  metroNIDAZOLE    Tablet 500 milliGRAM(s) Oral every 12 hours  midodrine. 10 milliGRAM(s) Oral three times a day  oxyCODONE    IR 5 milliGRAM(s) Oral every 8 hours  pantoprazole    Tablet 40 milliGRAM(s) Oral before breakfast  polyethylene glycol 3350 17 Gram(s) Oral daily  senna 2 Tablet(s) Oral at bedtime  sucralfate 1 Gram(s) Oral every 12 hours        VITALS:  T(F): 97.2 (08-25-24 @ 08:04), Max: 98 (08-24-24 @ 15:59)  HR: 62 (08-25-24 @ 08:04)  BP: 113/61 (08-25-24 @ 08:04)  RR: 18 (08-24-24 @ 15:59)  SpO2: --  Wt(kg): --    08-23 @ 07:01  -  08-24 @ 07:00  --------------------------------------------------------  IN: 0 mL / OUT: 700 mL / NET: -700 mL    08-24 @ 07:01  -  08-25 @ 07:00  --------------------------------------------------------  IN: 0 mL / OUT: 1150 mL / NET: -1150 mL      Height (cm): 170.2 (08-24 @ 12:25)  Weight (kg): 101 (08-24 @ 12:25)  BMI (kg/m2): 34.9 (08-24 @ 12:25)  BSA (m2): 2.12 (08-24 @ 12:25)    PHYSICAL EXAM:  	Gen: NAD  	Pulm: decrease BS  B/L  	CV: S1S2; no rub  	Abd: +distended  	LE:  edema      LABS:  08-24    137  |  101  |  79<HH>  ----------------------------<  83  4.6   |  27  |  2.5<H>    Ca    7.6<L>      24 Aug 2024 07:46  Mg     2.4     08-24    TPro  4.7<L>  /  Alb  2.4<L>  /  TBili  0.5  /  DBili      /  AST  22  /  ALT  6   /  AlkPhos  85  08-24                          7.7    4.81  )-----------( 180      ( 24 Aug 2024 07:46 )             25.3       Urine Studies:  Urinalysis Basic - ( 24 Aug 2024 07:46 )    Color:  / Appearance:  / SG:  / pH:   Gluc: 83 mg/dL / Ketone:   / Bili:  / Urobili:    Blood:  / Protein:  / Nitrite:    Leuk Esterase:  / RBC:  / WBC    Sq Epi:  / Non Sq Epi:  / Bacteria:         RADIOLOGY & ADDITIONAL STUDIES:   Nephrology progress note    Patient is seen and examined, events over the last 24 h noted .    Allergies:  aspirin (Rash; Other)  penicillins (Rash; Urticaria; Hives; Blisters)  latex (Unknown)  EKG leads (Hives)    Hospital Medications:   MEDICATIONS  (STANDING):  acetaminophen     Tablet .. 1000 milliGRAM(s) Oral every 8 hours  buMETAnide 1 milliGRAM(s) Oral two times a day  chlorhexidine 2% Cloths 1 Application(s) Topical daily  epoetin raquel (EPOGEN) Injectable 49085 Unit(s) SubCutaneous every 7 days  folic acid 1 milliGRAM(s) Oral daily  heparin   Injectable 5000 Unit(s) SubCutaneous every 12 hours  lactulose Syrup 20 Gram(s) Oral every 6 hours  metoprolol tartrate 12.5 milliGRAM(s) Oral every 12 hours  metroNIDAZOLE    Tablet 500 milliGRAM(s) Oral every 12 hours  midodrine. 10 milliGRAM(s) Oral three times a day  oxyCODONE    IR 5 milliGRAM(s) Oral every 8 hours  pantoprazole    Tablet 40 milliGRAM(s) Oral before breakfast  polyethylene glycol 3350 17 Gram(s) Oral daily  senna 2 Tablet(s) Oral at bedtime  sucralfate 1 Gram(s) Oral every 12 hours        VITALS:  T(F): 97.2 (08-25-24 @ 08:04), Max: 98 (08-24-24 @ 15:59)  HR: 62 (08-25-24 @ 08:04)  BP: 113/61 (08-25-24 @ 08:04)  RR: 18 (08-24-24 @ 15:59)  SpO2: --  Wt(kg): --    08-23 @ 07:01  -  08-24 @ 07:00  --------------------------------------------------------  IN: 0 mL / OUT: 700 mL / NET: -700 mL    08-24 @ 07:01  -  08-25 @ 07:00  --------------------------------------------------------  IN: 0 mL / OUT: 1150 mL / NET: -1150 mL      Height (cm): 170.2 (08-24 @ 12:25)  Weight (kg): 101 (08-24 @ 12:25)  BMI (kg/m2): 34.9 (08-24 @ 12:25)  BSA (m2): 2.12 (08-24 @ 12:25)    PHYSICAL EXAM:  	Gen: NAD  	Pulm: decrease BS  B/L  	CV: S1S2; no rub  	Abd: +distended  	LE:  edema      LABS:  08-24    137  |  101  |  79<HH>  ----------------------------<  83  4.6   |  27  |  2.5<H>    Ca    7.6<L>      24 Aug 2024 07:46  Mg     2.4     08-24    TPro  4.7<L>  /  Alb  2.4<L>  /  TBili  0.5  /  DBili      /  AST  22  /  ALT  6   /  AlkPhos  85  08-24                          7.7    4.81  )-----------( 180      ( 24 Aug 2024 07:46 )             25.3       Urine Studies:  Urinalysis Basic - ( 24 Aug 2024 07:46 )    Color:  / Appearance:  / SG:  / pH:   Gluc: 83 mg/dL / Ketone:   / Bili:  / Urobili:    Blood:  / Protein:  / Nitrite:    Leuk Esterase:  / RBC:  / WBC    Sq Epi:  / Non Sq Epi:  / Bacteria:         RADIOLOGY & ADDITIONAL STUDIES:

## 2024-08-25 NOTE — PROGRESS NOTE ADULT - SUBJECTIVE AND OBJECTIVE BOX
Pt seen and examined at bedside.          VITAL SIGNS (Last 24 hrs):  T(C): 36.2 (08-25-24 @ 08:04), Max: 36.2 (08-25-24 @ 08:04)  HR: 66 (08-25-24 @ 13:15) (57 - 80)  BP: 130/81 (08-25-24 @ 13:15) (113/57 - 130/81)        I&O's Summary    24 Aug 2024 07:01  -  25 Aug 2024 07:00  --------------------------------------------------   IN: 0 mL / OUT: 1150 mL / NET: -1150 mL        PHYSICAL EXAM:  GENERAL: NAD   HEAD:  Atraumatic, Normocephalic  EYES: conjunctiva and sclera clear  NECK: Supple, No JVD  CHEST/LUNG: Clear to auscultation bilaterally; No wheeze  HEART: Regular rate and rhythm; No murmurs, rubs, or gallops  ABDOMEN: Soft, Nontender, Nondistended; Bowel sounds present  EXTREMITIES:  2+ Peripheral Pulses, No clubbing, cyanosis, or edema  PSYCH: AAOx3  NEUROLOGY: non-focal  SKIN: No rashes or lesions      Labs Reviewed       CBC Full  -  ( 24 Aug 2024 07:46 )  WBC Count : 4.81 K/uL  Hemoglobin : 7.7 g/dL  Hematocrit : 25.3 %  Platelet Count - Automated : 180 K/uL  Mean Cell Volume : 101.2 fL  Mean Cell Hemoglobin : 30.8 pg  Mean Cell Hemoglobin Concentration : 30.4 g/dL  Auto Neutrophil # : 3.21 K/uL  Auto Lymphocyte # : 0.78 K/uL  Auto Monocyte # : 0.54 K/uL  Auto Eosinophil # : 0.24 K/uL  Auto Basophil # : 0.01 K/uL  Auto Neutrophil % : 66.8 %  Auto Lymphocyte % : 16.2 %  Auto Monocyte % : 11.2 %  Auto Eosinophil % : 5.0 %  Auto Basophil % : 0.2 %    BMP:    08-24 @ 07:46    Blood Urea Nitrogen - 79  Calcium - 7.6  Carbond Dioxide - 27  Chloride - 101  Creatinine - 2.5  Glucose - 83  Potassium - 4.6  Sodium - 137      Hemoglobin A1c -     Urine Culture:  08-23 @ 11:05 Urine culture: --    Culture Results:   Few Proteus mirabilis  Moderate Klebsiella pneumoniae  Moderate Enterococcus faecalis  Few Enterococcus avium  Method Type: --  Organism: --  Organism Identification: --  Specimen Source: Surgical Swab None            MEDICATIONS  (STANDING):  acetaminophen     Tablet .. 1000 milliGRAM(s) Oral every 8 hours  buMETAnide 1 milliGRAM(s) Oral two times a day  chlorhexidine 2% Cloths 1 Application(s) Topical daily  epoetin raquel (EPOGEN) Injectable 05981 Unit(s) SubCutaneous every 7 days  folic acid 1 milliGRAM(s) Oral daily  heparin   Injectable 5000 Unit(s) SubCutaneous every 12 hours  lactulose Syrup 20 Gram(s) Oral every 6 hours  metoprolol tartrate 12.5 milliGRAM(s) Oral every 12 hours  midodrine. 10 milliGRAM(s) Oral three times a day  pantoprazole    Tablet 40 milliGRAM(s) Oral before breakfast  polyethylene glycol 3350 17 Gram(s) Oral daily  senna 2 Tablet(s) Oral at bedtime  sucralfate 1 Gram(s) Oral every 12 hours    MEDICATIONS  (PRN):  HYDROmorphone  Injectable 0.5 milliGRAM(s) IV Push every 4 hours PRN Severe Pain (7 - 10)        Pt seen and examined at bedside.  Pain is controlled.         VITAL SIGNS (Last 24 hrs):  T(C): 36.2 (08-25-24 @ 08:04), Max: 36.2 (08-25-24 @ 08:04)  HR: 66 (08-25-24 @ 13:15) (57 - 80)  BP: 130/81 (08-25-24 @ 13:15) (113/57 - 130/81)        I&O's Summary    24 Aug 2024 07:01  -  25 Aug 2024 07:00  --------------------------------------------------   IN: 0 mL / OUT: 1150 mL / NET: -1150 mL        PHYSICAL EXAM:  GENERAL: NAD   HEAD:  Atraumatic, Normocephalic  EYES: conjunctiva and sclera clear  NECK: Supple, No JVD  CHEST/LUNG: Clear to auscultation bilaterally; No wheeze  HEART: Regular rate and rhythm; No murmurs, rubs, or gallops  ABDOMEN: Soft, Nontender, Nondistended; Bowel sounds present + [place]   EXTREMITIES:  2+ Peripheral Pulses, No clubbing, cyanosis + LE edema  SKIN: sacral ulcer       Labs Reviewed       CBC Full  -  ( 24 Aug 2024 07:46 )  WBC Count : 4.81 K/uL  Hemoglobin : 7.7 g/dL  Hematocrit : 25.3 %  Platelet Count - Automated : 180 K/uL  Mean Cell Volume : 101.2 fL  Mean Cell Hemoglobin : 30.8 pg  Mean Cell Hemoglobin Concentration : 30.4 g/dL  Auto Neutrophil # : 3.21 K/uL  Auto Lymphocyte # : 0.78 K/uL  Auto Monocyte # : 0.54 K/uL  Auto Eosinophil # : 0.24 K/uL  Auto Basophil # : 0.01 K/uL  Auto Neutrophil % : 66.8 %  Auto Lymphocyte % : 16.2 %  Auto Monocyte % : 11.2 %  Auto Eosinophil % : 5.0 %  Auto Basophil % : 0.2 %    BMP:    08-24 @ 07:46    Blood Urea Nitrogen - 79  Calcium - 7.6  Carbond Dioxide - 27  Chloride - 101  Creatinine - 2.5  Glucose - 83  Potassium - 4.6  Sodium - 137      Hemoglobin A1c -     Urine Culture:  08-23 @ 11:05 Urine culture: --    Culture Results:   Few Proteus mirabilis  Moderate Klebsiella pneumoniae  Moderate Enterococcus faecalis  Few Enterococcus avium  Method Type: --  Organism: --  Organism Identification: --  Specimen Source: Surgical Swab None            MEDICATIONS  (STANDING):  acetaminophen     Tablet .. 1000 milliGRAM(s) Oral every 8 hours  buMETAnide 1 milliGRAM(s) Oral two times a day  chlorhexidine 2% Cloths 1 Application(s) Topical daily  epoetin raquel (EPOGEN) Injectable 61549 Unit(s) SubCutaneous every 7 days  folic acid 1 milliGRAM(s) Oral daily  heparin   Injectable 5000 Unit(s) SubCutaneous every 12 hours  lactulose Syrup 20 Gram(s) Oral every 6 hours  metoprolol tartrate 12.5 milliGRAM(s) Oral every 12 hours  midodrine. 10 milliGRAM(s) Oral three times a day  pantoprazole    Tablet 40 milliGRAM(s) Oral before breakfast  polyethylene glycol 3350 17 Gram(s) Oral daily  senna 2 Tablet(s) Oral at bedtime  sucralfate 1 Gram(s) Oral every 12 hours    MEDICATIONS  (PRN):  HYDROmorphone  Injectable 0.5 milliGRAM(s) IV Push every 4 hours PRN Severe Pain (7 - 10)

## 2024-08-25 NOTE — PROGRESS NOTE ADULT - ASSESSMENT
78-year-old female with past medical history of   CKD, HTN, CCY, gout,  severe AS being planned for tAVR, and history of severe anemia secondary to chronic upper GI bleeding due to  AV malformation gastric body dieulafoy lesion, weekly blood transfusions presenting to ER for a rupture pressure ulcer of the buttock area. Admitted for metabolic encephalopathy 2/2 uremia vs hyperammonemia, started on HD.  Mental status improved. now currently monitoring off HD.        # Metabolic encephalopathy - resolved   # Acute kidney injury on CKD stage 4  # Hyperammonemia - per GI, not hepatic encephalopathy   - mental status improved now, no need for brain MRI  - currently monitoring off HD, [place] removed   - on Lactulose   - c/w Bumex 1 mg Q 12 hours     # Sacrum: pressure ulcer with necrosis/ localized abscess/cellulitis  - 8/14 s/p bedside debridement of bilateral buttocks by Dr Schwarz   - s/p debridement in OR by [facility] 8/23 - cleared by [facility] for discharge   - continue local wound care bid: wash wounds with soap and water, apply hydrogel, cover with Vashe moist gauze, then apply ABD and tape.  - pain management - Tylenol ATC, Dilaudid PRN   - offloading, position changes  - no abx per ID   - wound culture growing multiple organisms - likely colonizers     # Asymptomatic bacteruria with ESBL Enterobacter with culver catheter  - s/p 6 days of meropenem     # Prolonged QTc   - repeat EKG daily   - K above 4, mg above 2.2   - avoid QTc prolonging meds     # Acute on chronic  anemia/ History of AVMs (gastric and duodenal) and Dieulafoy Lesions Status Post Hemostasis in 2023/ History of perisplenic Varices in 2023   - s/p EGD/colonoscopy/push enteroscopy on 7/22 showing AVMs and polyps  - s/p 1U pRBC on 8/2, 8/11, 8/16   - s/p course of Venofer  - on weekly IV transfusion/iron infusions per hematology  - monitor h/h, keep active T&S  - keep Hb above 7.0     # Chronic diastolic CHF/ Severe aortic stenosis   - TTE Echo Complete w/o Contrast w/ Doppler (08.01.24 @ 11:03) >EF of 56 %. Mild asymmetric left ventricular hypertrophy. Grade II diastolic dysfunction). Severe aortic valve stenosis. Mild to moderate mitral valve regurgitation. Moderate aortic regurgitation.  - Patient to follow up with Dr. Hernandez as an outpatient for TAVR  - c/w metoprolol, c/w midodrine 10 q8H    DVT ppx     #Progress Note Handoff  Pending (specify): STR placement, needs auth    Spoke to son over the phone  Dispo: Son only wants [facility], [facility] or [facility]. Refusing [facility].  78-year-old female with past medical history of   CKD, HTN, CCY, gout,  severe AS being planned for tAVR, and history of severe anemia secondary to chronic upper GI bleeding due to  AV malformation gastric body dieulafoy lesion, weekly blood transfusions presenting to ER for a rupture pressure ulcer of the buttock area. Admitted for metabolic encephalopathy 2/2 uremia vs hyperammonemia, started on HD.  Mental status improved. now currently monitoring off HD.        # Metabolic encephalopathy - resolved   # Acute kidney injury on CKD stage 4  # Hyperammonemia - per GI, not hepatic encephalopathy   - mental status improved now, no need for brain MRI  - currently monitoring off HD, [place] removed   - on Lactulose   - c/w Bumex 1 mg Q 12 hours     # Sacrum: pressure ulcer with necrosis/ localized abscess/cellulitis  - 8/14 s/p bedside debridement of bilateral buttocks by Dr Schwarz   - s/p debridement in OR by [facility] 8/23 - cleared by [facility] for discharge   - continue local wound care bid: wash wounds with soap and water, apply hydrogel, cover with Vashe moist gauze, then apply ABD and tape.  - pain management - Tylenol ATC, Dilaudid PRN   - offloading, position changes  - no abx per ID   - wound culture growing multiple organisms - likely colonizers   - c/w [place]ver     # Asymptomatic bacteruria with ESBL Enterobacter with culver catheter  - s/p 6 days of meropenem     # Prolonged QTc   - repeat EKG daily   - K above 4, mg above 2.2   - avoid QTc prolonging meds     # Acute on chronic  anemia/ History of AVMs (gastric and duodenal) and Dieulafoy Lesions Status Post Hemostasis in 2023/ History of perisplenic Varices in 2023   - s/p EGD/colonoscopy/push enteroscopy on 7/22 showing AVMs and polyps  - s/p 1U pRBC on 8/2, 8/11, 8/16   - s/p course of Venofer  - on weekly IV transfusion/iron infusions per hematology  - monitor h/h, keep active T&S  - keep Hb above 7.0     # Chronic diastolic CHF/ Severe aortic stenosis   - TTE Echo Complete w/o Contrast w/ Doppler (08.01.24 @ 11:03) >EF of 56 %. Mild asymmetric left ventricular hypertrophy. Grade II diastolic dysfunction). Severe aortic valve stenosis. Mild to moderate mitral valve regurgitation. Moderate aortic regurgitation.  - Patient to follow up with Dr. Hernandez as an outpatient for TAVR  - c/w metoprolol, c/w midodrine 10 q8H    DVT ppx     #Progress Note Handoff  Pending (specify): STR placement, needs auth    Spoke to son over the phone  Dispo: Son only wants [facility], [facility] or [facility]. Refusing [facility].

## 2024-08-26 LAB
-  AMPICILLIN: SIGNIFICANT CHANGE UP
-  AMPICILLIN: SIGNIFICANT CHANGE UP
-  VANCOMYCIN: SIGNIFICANT CHANGE UP
-  VANCOMYCIN: SIGNIFICANT CHANGE UP
ALBUMIN SERPL ELPH-MCNC: 2.4 G/DL — LOW (ref 3.5–5.2)
ALP SERPL-CCNC: 71 U/L — SIGNIFICANT CHANGE UP (ref 30–115)
ALT FLD-CCNC: 6 U/L — SIGNIFICANT CHANGE UP (ref 0–41)
ANION GAP SERPL CALC-SCNC: 9 MMOL/L — SIGNIFICANT CHANGE UP (ref 7–14)
AST SERPL-CCNC: 22 U/L — SIGNIFICANT CHANGE UP (ref 0–41)
BASOPHILS # BLD AUTO: 0.01 K/UL — SIGNIFICANT CHANGE UP (ref 0–0.2)
BASOPHILS NFR BLD AUTO: 0.2 % — SIGNIFICANT CHANGE UP (ref 0–1)
BILIRUB SERPL-MCNC: 0.4 MG/DL — SIGNIFICANT CHANGE UP (ref 0.2–1.2)
BUN SERPL-MCNC: 84 MG/DL — CRITICAL HIGH (ref 10–20)
CALCIUM SERPL-MCNC: 7.7 MG/DL — LOW (ref 8.4–10.5)
CHLORIDE SERPL-SCNC: 104 MMOL/L — SIGNIFICANT CHANGE UP (ref 98–110)
CO2 SERPL-SCNC: 28 MMOL/L — SIGNIFICANT CHANGE UP (ref 17–32)
CREAT SERPL-MCNC: 2.4 MG/DL — HIGH (ref 0.7–1.5)
EGFR: 20 ML/MIN/1.73M2 — LOW
EOSINOPHIL # BLD AUTO: 0.22 K/UL — SIGNIFICANT CHANGE UP (ref 0–0.7)
EOSINOPHIL NFR BLD AUTO: 5.3 % — SIGNIFICANT CHANGE UP (ref 0–8)
GLUCOSE SERPL-MCNC: 93 MG/DL — SIGNIFICANT CHANGE UP (ref 70–99)
HCT VFR BLD CALC: 23.7 % — LOW (ref 37–47)
HGB BLD-MCNC: 7.3 G/DL — LOW (ref 12–16)
IMM GRANULOCYTES NFR BLD AUTO: 0.7 % — HIGH (ref 0.1–0.3)
LYMPHOCYTES # BLD AUTO: 0.62 K/UL — LOW (ref 1.2–3.4)
LYMPHOCYTES # BLD AUTO: 15 % — LOW (ref 20.5–51.1)
MCHC RBC-ENTMCNC: 30.8 G/DL — LOW (ref 32–37)
MCHC RBC-ENTMCNC: 31.1 PG — HIGH (ref 27–31)
MCV RBC AUTO: 100.9 FL — HIGH (ref 81–99)
METHOD TYPE: SIGNIFICANT CHANGE UP
METHOD TYPE: SIGNIFICANT CHANGE UP
MONOCYTES # BLD AUTO: 0.46 K/UL — SIGNIFICANT CHANGE UP (ref 0.1–0.6)
MONOCYTES NFR BLD AUTO: 11.2 % — HIGH (ref 1.7–9.3)
NEUTROPHILS # BLD AUTO: 2.78 K/UL — SIGNIFICANT CHANGE UP (ref 1.4–6.5)
NEUTROPHILS NFR BLD AUTO: 67.6 % — SIGNIFICANT CHANGE UP (ref 42.2–75.2)
NRBC # BLD: 0 /100 WBCS — SIGNIFICANT CHANGE UP (ref 0–0)
PHOSPHATE SERPL-MCNC: 4.7 MG/DL — SIGNIFICANT CHANGE UP (ref 2.1–4.9)
PLATELET # BLD AUTO: 149 K/UL — SIGNIFICANT CHANGE UP (ref 130–400)
PMV BLD: 9.6 FL — SIGNIFICANT CHANGE UP (ref 7.4–10.4)
POTASSIUM SERPL-MCNC: 4.5 MMOL/L — SIGNIFICANT CHANGE UP (ref 3.5–5)
POTASSIUM SERPL-SCNC: 4.5 MMOL/L — SIGNIFICANT CHANGE UP (ref 3.5–5)
PROT SERPL-MCNC: 4.8 G/DL — LOW (ref 6–8)
RBC # BLD: 2.35 M/UL — LOW (ref 4.2–5.4)
RBC # FLD: 19 % — HIGH (ref 11.5–14.5)
SODIUM SERPL-SCNC: 141 MMOL/L — SIGNIFICANT CHANGE UP (ref 135–146)
WBC # BLD: 4.12 K/UL — LOW (ref 4.8–10.8)
WBC # FLD AUTO: 4.12 K/UL — LOW (ref 4.8–10.8)

## 2024-08-26 PROCEDURE — 99232 SBSQ HOSP IP/OBS MODERATE 35: CPT

## 2024-08-26 RX ADMIN — Medication 5000 UNIT(S): at 05:31

## 2024-08-26 RX ADMIN — Medication 1 MILLIGRAM(S): at 11:51

## 2024-08-26 RX ADMIN — METOPROLOL TARTRATE 12.5 MILLIGRAM(S): 100 TABLET ORAL at 05:32

## 2024-08-26 RX ADMIN — BUMETANIDE 1 MILLIGRAM(S): 2 TABLET ORAL at 11:55

## 2024-08-26 RX ADMIN — ACETAMINOPHEN 1000 MILLIGRAM(S): 325 TABLET ORAL at 11:50

## 2024-08-26 RX ADMIN — ACETAMINOPHEN 1000 MILLIGRAM(S): 325 TABLET ORAL at 05:32

## 2024-08-26 RX ADMIN — POLYETHYLENE GLYCOL 3350 17 GRAM(S): 17 POWDER, FOR SOLUTION ORAL at 11:51

## 2024-08-26 RX ADMIN — Medication 40 MILLIGRAM(S): at 05:32

## 2024-08-26 RX ADMIN — Medication 20 GRAM(S): at 05:32

## 2024-08-26 RX ADMIN — ACETAMINOPHEN 1000 MILLIGRAM(S): 325 TABLET ORAL at 21:47

## 2024-08-26 RX ADMIN — Medication 2 TABLET(S): at 21:47

## 2024-08-26 RX ADMIN — MIDODRINE HYDROCHLORIDE 10 MILLIGRAM(S): 5 TABLET ORAL at 11:51

## 2024-08-26 RX ADMIN — HYDROMORPHONE HYDROCHLORIDE 0.5 MILLIGRAM(S): 2 TABLET ORAL at 12:03

## 2024-08-26 RX ADMIN — SUCRALFATE 1 GRAM(S): 1 SUSPENSION ORAL at 05:32

## 2024-08-26 RX ADMIN — Medication 20 GRAM(S): at 11:51

## 2024-08-26 RX ADMIN — MIDODRINE HYDROCHLORIDE 10 MILLIGRAM(S): 5 TABLET ORAL at 05:32

## 2024-08-26 RX ADMIN — HYDROMORPHONE HYDROCHLORIDE 0.5 MILLIGRAM(S): 2 TABLET ORAL at 21:48

## 2024-08-26 RX ADMIN — ACETAMINOPHEN 1000 MILLIGRAM(S): 325 TABLET ORAL at 06:18

## 2024-08-26 RX ADMIN — BUMETANIDE 1 MILLIGRAM(S): 2 TABLET ORAL at 05:32

## 2024-08-26 NOTE — PROGRESS NOTE ADULT - SUBJECTIVE AND OBJECTIVE BOX
Pt seen and examined at bedside.        VITAL SIGNS (Last 24 hrs):   T(C): 36.4 (08-26-24 @ 08:23), Max: 36.7 (08-25-24 @ 17:23)  HR: 69 (08-26-24 @ 11:53) (65 - 73)  BP: 96/42 (08-26-24 @ 11:53) (96/42 - 110/68)  RR: 18 (08-26-24 @ 08:23) (18 - 18)           I&O's Summary    26 Aug 2024 07:01  -  26 Aug 2024 16:12  --------------------------------------------------   IN: 0 mL / OUT: 600 mL / NET: -600 mL        PHYSICAL EXAM:  GENERAL: NAD, well-developed  HEAD:  Atraumatic, Normocephalic  EYES: EOMI, PERRLA, conjunctiva and sclera clear  NECK: Supple, No JVD  CHEST/LUNG: Clear to auscultation bilaterally; No wheeze  HEART: Regular rate and rhythm; No murmurs, rubs, or gallops  ABDOMEN: Soft, Nontender, Nondistended; Bowel sounds present  EXTREMITIES:  2+ Peripheral Pulses, No clubbing, cyanosis, or edema  PSYCH: AAOx3  NEUROLOGY: non-focal  SKIN: No rashes or lesions    Labs Reviewed  Spoke to patient in regards to abnormal labs.    CBC Full  -  ( 26 Aug 2024 08:54 )  WBC Count : 4.12 K/uL  Hemoglobin : 7.3 g/dL  Hematocrit : 23.7 %  Platelet Count - Automated : 149 K/uL  Mean Cell Volume : 100.9 fL  Mean Cell Hemoglobin : 31.1 pg  Mean Cell Hemoglobin Concentration : 30.8 g/dL  Auto Neutrophil # : 2.78 K/uL  Auto Lymphocyte # : 0.62 K/uL  Auto Monocyte # : 0.46 K/uL  Auto Eosinophil # : 0.22 K/uL  Auto Basophil # : 0.01 K/uL  Auto Neutrophil % : 67.6 %  Auto Lymphocyte % : 15.0 %  Auto Monocyte % : 11.2 %  Auto Eosinophil % : 5.3 %  Auto Basophil % : 0.2 %    BMP:    08-26 @ 08:54    Blood Urea Nitrogen - 84  Calcium - 7.7  Carbond Dioxide - 28  Chloride - 104  Creatinine - 2.4  Glucose - 93  Potassium - 4.5  Sodium - 141      Hemoglobin A1c -     Urine Culture:  08-23 @ 11:05 Urine culture: --    Culture Results:   Few Proteus mirabilis Susceptibility to follow.  Moderate Klebsiella pneumoniae Susceptibility to follow.  Moderate Enterococcus faecalis  Few Enterococcus avium  Few Enterococcus faecium Susceptibility to follow.  Method Type: BAUTISTA  Organism: Enterococcus faecalis  Organism Identification: Enterococcus faecalis  Enterococcus avium  Specimen Source: Surgical Swab None            MEDICATIONS  (STANDING):  acetaminophen     Tablet .. 1000 milliGRAM(s) Oral every 8 hours  buMETAnide 1 milliGRAM(s) Oral two times a day  chlorhexidine 2% Cloths 1 Application(s) Topical daily  epoetin raquel (EPOGEN) Injectable 92946 Unit(s) SubCutaneous every 7 days  folic acid 1 milliGRAM(s) Oral daily  heparin   Injectable 5000 Unit(s) SubCutaneous every 12 hours  lactulose Syrup 20 Gram(s) Oral every 6 hours  midodrine. 10 milliGRAM(s) Oral three times a day  pantoprazole    Tablet 40 milliGRAM(s) Oral before breakfast  polyethylene glycol 3350 17 Gram(s) Oral daily  senna 2 Tablet(s) Oral at bedtime  sucralfate 1 Gram(s) Oral every 12 hours    MEDICATIONS  (PRN):  HYDROmorphone  Injectable 0.5 milliGRAM(s) IV Push every 4 hours PRN Severe Pain (7 - 10)

## 2024-08-26 NOTE — PROGRESS NOTE ADULT - SUBJECTIVE AND OBJECTIVE BOX
seen and examined  24 h events noted   no distress         PAST HISTORY  --------------------------------------------------------------------------------  No significant changes to PMH, PSH, FHx, SHx, unless otherwise noted    ALLERGIES & MEDICATIONS  --------------------------------------------------------------------------------  Allergies    aspirin (Rash; Other)  penicillins (Rash; Urticaria; Hives; Blisters)  latex (Unknown)  EKG leads (Hives)    Intolerances      Standing Inpatient Medications  acetaminophen     Tablet .. 1000 milliGRAM(s) Oral every 8 hours  buMETAnide 1 milliGRAM(s) Oral two times a day  chlorhexidine 2% Cloths 1 Application(s) Topical daily  epoetin raquel (EPOGEN) Injectable 42424 Unit(s) SubCutaneous every 7 days  folic acid 1 milliGRAM(s) Oral daily  heparin   Injectable 5000 Unit(s) SubCutaneous every 12 hours  lactulose Syrup 20 Gram(s) Oral every 6 hours  metoprolol tartrate 12.5 milliGRAM(s) Oral every 12 hours  midodrine. 10 milliGRAM(s) Oral three times a day  pantoprazole    Tablet 40 milliGRAM(s) Oral before breakfast  polyethylene glycol 3350 17 Gram(s) Oral daily  senna 2 Tablet(s) Oral at bedtime  sucralfate 1 Gram(s) Oral every 12 hours    PRN Inpatient Medications  HYDROmorphone  Injectable 0.5 milliGRAM(s) IV Push every 4 hours PRN        VITALS/PHYSICAL EXAM  --------------------------------------------------------------------------------  T(C): 36.1 (08-25-24 @ 23:52), Max: 36.7 (08-25-24 @ 17:23)  HR: 73 (08-26-24 @ 05:15) (57 - 73)  BP: 107/59 (08-26-24 @ 05:15) (107/59 - 130/81)  RR: 18 (08-25-24 @ 23:52) (18 - 18)  SpO2: --  Wt(kg): --  Height (cm): 170.2 (08-24-24 @ 12:25)  Weight (kg): 101 (08-24-24 @ 12:25)  BMI (kg/m2): 34.9 (08-24-24 @ 12:25)  BSA (m2): 2.12 (08-24-24 @ 12:25)      Physical Exam:  	Gen: NAD  	Pulm: decrease BS  B/L  	CV:  S1S2; no rub  	Abd: +distended      LABS/STUDIES  --------------------------------------------------------------------------------              7.7    4.81  >-----------<  180      [08-24-24 @ 07:46]              25.3     137  |  101  |  79  ----------------------------<  83      [08-24-24 @ 07:46]  4.6   |  27  |  2.5        Ca     7.6     [08-24-24 @ 07:46]      Mg     2.4     [08-24-24 @ 07:46]    TPro  4.7  /  Alb  2.4  /  TBili  0.5  /  DBili  x   /  AST  22  /  ALT  6   /  AlkPhos  85  [08-24-24 @ 07:46]      Creatinine Trend:  SCr 2.5 [08-24 @ 07:46]  SCr 2.6 [08-23 @ 06:11]  SCr 2.6 [08-22 @ 07:08]  SCr 3.0 [08-20 @ 22:11]  SCr 3.0 [08-20 @ 07:40]    Urinalysis - [08-24-24 @ 07:46]      Color  / Appearance  / SG  / pH       Gluc 83 / Ketone   / Bili  / Urobili        Blood  / Protein  / Leuk Est  / Nitrite       RBC  / WBC  / Hyaline  / Gran  / Sq Epi  / Non Sq Epi  / Bacteria       Iron 34, TIBC 150, %sat 23      [08-15-24 @ 10:36]  Ferritin 675      [08-15-24 @ 10:36]  TSH 4.36      [08-08-24 @ 09:40]    HBsAb Nonreact      [08-11-24 @ 11:39]  HBsAg Nonreact      [08-11-24 @ 11:39]  HBcAb Nonreact      [08-11-24 @ 11:39]

## 2024-08-26 NOTE — PROGRESS NOTE ADULT - ASSESSMENT
78-year-old female with past medical history of   CKD, HTN, CCY, gout,  severe AS being planned for tAVR, and history of severe anemia secondary to chronic upper GI bleeding due to  AV malformation gastric body dieulafoy lesion, weekly blood transfusions presenting to ER for a rupture pressure ulcer of the buttock area. Admitted for metabolic encephalopathy 2/2 uremia vs hyperammonemia, started on HD.  Mental status improved. now currently monitoring off HD.        # Metabolic encephalopathy - resolved   # Acute kidney injury on CKD stage 4  # Hyperammonemia - per GI, not hepatic encephalopathy   - mental status improved now, no need for brain MRI  - currently monitoring off HD, [place] removed   - on Lactulose   - c/w Bumex 1 mg Q 12 hours (hold for hypotension)     # Sacrum: pressure ulcer with necrosis/ localized abscess/cellulitis  - 8/14 s/p bedside debridement of bilateral buttocks by Dr Schwarz   - s/p debridement in OR by BURN 8/23 - cleared by BURN for discharge   - continue local wound care bid: wash wounds with soap and water, apply hydrogel, cover with Vashe moist gauze, then apply ABD and tape.  - pain management - Tylenol ATC, Dilaudid PRN   - offloading, position changes  - no abx per ID   - wound culture growing multiple organisms - likely colonizers    - c/w Arora     # Asymptomatic bacteruria with ESBL Enterobacter with Arora catheter  - s/p 6 days of meropenem     # Prolonged QTc   - repeat EKG daily   - K above 4, mg above 2.2   - avoid QTc prolonging meds     # Acute on chronic  anemia/ History of AVMs (gastric and duodenal) and Dieulafoy Lesions Status Post Hemostasis in 2023/ History of perisplenic Varices in 2023   - s/p EGD/colonoscopy/push enteroscopy on 7/22 showing AVMs and polyps  - s/p pRBC on 8/2, 8/11, 8/16   - s/p course of Venofer  - on weekly IV transfusion/iron infusions per hematology  - monitor h/h, keep active T&S  - keep Hb above 7.0   - hgb downtrending again, monitor closely     # Chronic diastolic CHF/ Severe aortic stenosis   - TTE Echo Complete w/o Contrast w/ Doppler (08.01.24 @ 11:03) >EF of 56 %. Mild asymmetric left ventricular hypertrophy. Grade II diastolic dysfunction). Severe aortic valve stenosis. Mild to moderate mitral valve regurgitation. Moderate aortic regurgitation.  - Patient to follow up with Dr. Hernandez as an outpatient for TAVR  - c/w midodrine 10 q8H  - hold metoprolol for hypotension     DVT ppx     #Progress Note Handoff  Pending (specify): Monitor hgb and BP, STR placement, needs auth    Spoke to son over the phone 8/25   Dispo: Son only wants [place], [place] or [place]. Refusing [facility]. CM aware.

## 2024-08-26 NOTE — PROGRESS NOTE ADULT - ASSESSMENT
78-year-old female with past medical history of   CKD, HTN, CCY, gout,  severe AS being planned for TAVR, and history of severe anemia secondary to chronic upper GI bleeding due to  AV malformation gastric body dieulafoy lesion, weekly blood transfusions presenting to ER for a rupture pressure ulcer of the buttock area.  #EVAN on CKD4/ anemia   #AMS  - creat noted at baseline from 4/2024 stable at mid 2's/ please repeat cr today   - no  need for HD for now last HD was 8/15  - continue bumex/ non oliguric   - no hydro on renal/bladder sono   - cont WOODY wkly, Tsat low/ no venofer on atb   - followed by burn   - check ph level   - bp noted / continue midodrine   - overall prognosis poor   will follow

## 2024-08-27 LAB
-  AMOXICILLIN/CLAVULANIC ACID: SIGNIFICANT CHANGE UP
-  AMOXICILLIN/CLAVULANIC ACID: SIGNIFICANT CHANGE UP
-  AMPICILLIN/SULBACTAM: SIGNIFICANT CHANGE UP
-  AMPICILLIN/SULBACTAM: SIGNIFICANT CHANGE UP
-  AMPICILLIN: SIGNIFICANT CHANGE UP
-  AZTREONAM: SIGNIFICANT CHANGE UP
-  AZTREONAM: SIGNIFICANT CHANGE UP
-  CEFAZOLIN: SIGNIFICANT CHANGE UP
-  CEFAZOLIN: SIGNIFICANT CHANGE UP
-  CEFEPIME: SIGNIFICANT CHANGE UP
-  CEFEPIME: SIGNIFICANT CHANGE UP
-  CEFOXITIN: SIGNIFICANT CHANGE UP
-  CEFOXITIN: SIGNIFICANT CHANGE UP
-  CEFTAZIDIME/AVIBACTAM: SIGNIFICANT CHANGE UP
-  CEFTAZIDIME/AVIBACTAM: SIGNIFICANT CHANGE UP
-  CEFTOLOZANE/TAZOBACTAM: SIGNIFICANT CHANGE UP
-  CEFTOLOZANE/TAZOBACTAM: SIGNIFICANT CHANGE UP
-  CEFTRIAXONE: SIGNIFICANT CHANGE UP
-  CEFTRIAXONE: SIGNIFICANT CHANGE UP
-  CIPROFLOXACIN: SIGNIFICANT CHANGE UP
-  CIPROFLOXACIN: SIGNIFICANT CHANGE UP
-  ERTAPENEM: SIGNIFICANT CHANGE UP
-  ERTAPENEM: SIGNIFICANT CHANGE UP
-  GENTAMICIN: SIGNIFICANT CHANGE UP
-  GENTAMICIN: SIGNIFICANT CHANGE UP
-  IMIPENEM: SIGNIFICANT CHANGE UP
-  LEVOFLOXACIN: SIGNIFICANT CHANGE UP
-  LEVOFLOXACIN: SIGNIFICANT CHANGE UP
-  MEROPENEM/VABORBACTAM: SIGNIFICANT CHANGE UP
-  MEROPENEM/VABORBACTAM: SIGNIFICANT CHANGE UP
-  MEROPENEM: SIGNIFICANT CHANGE UP
-  MEROPENEM: SIGNIFICANT CHANGE UP
-  PIPERACILLIN/TAZOBACTAM: SIGNIFICANT CHANGE UP
-  PIPERACILLIN/TAZOBACTAM: SIGNIFICANT CHANGE UP
-  RESISTANCE GENE KPC: SIGNIFICANT CHANGE UP
-  RESISTANCE GENE KPC: SIGNIFICANT CHANGE UP
-  RESISTANCE GENE NDM: SIGNIFICANT CHANGE UP
-  RESISTANCE GENE NDM: SIGNIFICANT CHANGE UP
-  TOBRAMYCIN: SIGNIFICANT CHANGE UP
-  TOBRAMYCIN: SIGNIFICANT CHANGE UP
-  TRIMETHOPRIM/SULFAMETHOXAZOLE: SIGNIFICANT CHANGE UP
-  TRIMETHOPRIM/SULFAMETHOXAZOLE: SIGNIFICANT CHANGE UP
-  VANCOMYCIN: SIGNIFICANT CHANGE UP
ANION GAP SERPL CALC-SCNC: 8 MMOL/L — SIGNIFICANT CHANGE UP (ref 7–14)
BASOPHILS # BLD AUTO: 0.01 K/UL — SIGNIFICANT CHANGE UP (ref 0–0.2)
BASOPHILS NFR BLD AUTO: 0.3 % — SIGNIFICANT CHANGE UP (ref 0–1)
BLANDM BLD POS QL PROBE: SIGNIFICANT CHANGE UP
BLANDM BLD POS QL PROBE: SIGNIFICANT CHANGE UP
BUN SERPL-MCNC: 81 MG/DL — CRITICAL HIGH (ref 10–20)
CALCIUM SERPL-MCNC: 8 MG/DL — LOW (ref 8.4–10.5)
CHLORIDE SERPL-SCNC: 103 MMOL/L — SIGNIFICANT CHANGE UP (ref 98–110)
CO2 SERPL-SCNC: 28 MMOL/L — SIGNIFICANT CHANGE UP (ref 17–32)
CREAT SERPL-MCNC: 2.1 MG/DL — HIGH (ref 0.7–1.5)
EGFR: 24 ML/MIN/1.73M2 — LOW
EOSINOPHIL # BLD AUTO: 0.18 K/UL — SIGNIFICANT CHANGE UP (ref 0–0.7)
EOSINOPHIL NFR BLD AUTO: 5.1 % — SIGNIFICANT CHANGE UP (ref 0–8)
GLUCOSE SERPL-MCNC: 94 MG/DL — SIGNIFICANT CHANGE UP (ref 70–99)
HCT VFR BLD CALC: 23 % — LOW (ref 37–47)
HGB BLD-MCNC: 7.1 G/DL — LOW (ref 12–16)
IMM GRANULOCYTES NFR BLD AUTO: 0.8 % — HIGH (ref 0.1–0.3)
LYMPHOCYTES # BLD AUTO: 0.54 K/UL — LOW (ref 1.2–3.4)
LYMPHOCYTES # BLD AUTO: 15.3 % — LOW (ref 20.5–51.1)
MCHC RBC-ENTMCNC: 30.7 PG — SIGNIFICANT CHANGE UP (ref 27–31)
MCHC RBC-ENTMCNC: 30.9 G/DL — LOW (ref 32–37)
MCV RBC AUTO: 99.6 FL — HIGH (ref 81–99)
METHOD TYPE: SIGNIFICANT CHANGE UP
MONOCYTES # BLD AUTO: 0.36 K/UL — SIGNIFICANT CHANGE UP (ref 0.1–0.6)
MONOCYTES NFR BLD AUTO: 10.2 % — HIGH (ref 1.7–9.3)
NEUTROPHILS # BLD AUTO: 2.42 K/UL — SIGNIFICANT CHANGE UP (ref 1.4–6.5)
NEUTROPHILS NFR BLD AUTO: 68.3 % — SIGNIFICANT CHANGE UP (ref 42.2–75.2)
NRBC # BLD: 0 /100 WBCS — SIGNIFICANT CHANGE UP (ref 0–0)
PLATELET # BLD AUTO: 140 K/UL — SIGNIFICANT CHANGE UP (ref 130–400)
PMV BLD: 9.5 FL — SIGNIFICANT CHANGE UP (ref 7.4–10.4)
POTASSIUM SERPL-MCNC: 4.6 MMOL/L — SIGNIFICANT CHANGE UP (ref 3.5–5)
POTASSIUM SERPL-SCNC: 4.6 MMOL/L — SIGNIFICANT CHANGE UP (ref 3.5–5)
RBC # BLD: 2.31 M/UL — LOW (ref 4.2–5.4)
RBC # FLD: 18.8 % — HIGH (ref 11.5–14.5)
SODIUM SERPL-SCNC: 139 MMOL/L — SIGNIFICANT CHANGE UP (ref 135–146)
WBC # BLD: 3.54 K/UL — LOW (ref 4.8–10.8)
WBC # FLD AUTO: 3.54 K/UL — LOW (ref 4.8–10.8)

## 2024-08-27 PROCEDURE — 99232 SBSQ HOSP IP/OBS MODERATE 35: CPT

## 2024-08-27 RX ADMIN — Medication 20 GRAM(S): at 00:31

## 2024-08-27 RX ADMIN — Medication 20 GRAM(S): at 05:33

## 2024-08-27 RX ADMIN — Medication 40 MILLIGRAM(S): at 19:37

## 2024-08-27 RX ADMIN — MIDODRINE HYDROCHLORIDE 10 MILLIGRAM(S): 5 TABLET ORAL at 12:00

## 2024-08-27 RX ADMIN — CHLORHEXIDINE GLUCONATE 1 APPLICATION(S): 40 SOLUTION TOPICAL at 12:54

## 2024-08-27 RX ADMIN — POLYETHYLENE GLYCOL 3350 17 GRAM(S): 17 POWDER, FOR SOLUTION ORAL at 12:03

## 2024-08-27 RX ADMIN — ACETAMINOPHEN 1000 MILLIGRAM(S): 325 TABLET ORAL at 13:43

## 2024-08-27 RX ADMIN — Medication 20 GRAM(S): at 17:50

## 2024-08-27 RX ADMIN — ACETAMINOPHEN 1000 MILLIGRAM(S): 325 TABLET ORAL at 05:34

## 2024-08-27 RX ADMIN — Medication 5000 UNIT(S): at 05:34

## 2024-08-27 RX ADMIN — HYDROMORPHONE HYDROCHLORIDE 0.5 MILLIGRAM(S): 2 TABLET ORAL at 08:47

## 2024-08-27 RX ADMIN — SUCRALFATE 1 GRAM(S): 1 SUSPENSION ORAL at 05:34

## 2024-08-27 RX ADMIN — Medication 2 TABLET(S): at 21:48

## 2024-08-27 RX ADMIN — Medication 1 MILLIGRAM(S): at 12:00

## 2024-08-27 RX ADMIN — BUMETANIDE 1 MILLIGRAM(S): 2 TABLET ORAL at 05:34

## 2024-08-27 RX ADMIN — ACETAMINOPHEN 1000 MILLIGRAM(S): 325 TABLET ORAL at 14:30

## 2024-08-27 RX ADMIN — Medication 20 GRAM(S): at 12:03

## 2024-08-27 RX ADMIN — HYDROMORPHONE HYDROCHLORIDE 0.5 MILLIGRAM(S): 2 TABLET ORAL at 21:47

## 2024-08-27 RX ADMIN — ACETAMINOPHEN 1000 MILLIGRAM(S): 325 TABLET ORAL at 21:48

## 2024-08-27 RX ADMIN — BUMETANIDE 1 MILLIGRAM(S): 2 TABLET ORAL at 13:43

## 2024-08-27 RX ADMIN — MIDODRINE HYDROCHLORIDE 10 MILLIGRAM(S): 5 TABLET ORAL at 17:51

## 2024-08-27 RX ADMIN — HYDROMORPHONE HYDROCHLORIDE 0.5 MILLIGRAM(S): 2 TABLET ORAL at 09:26

## 2024-08-27 RX ADMIN — MIDODRINE HYDROCHLORIDE 10 MILLIGRAM(S): 5 TABLET ORAL at 05:34

## 2024-08-27 RX ADMIN — SUCRALFATE 1 GRAM(S): 1 SUSPENSION ORAL at 17:51

## 2024-08-27 NOTE — PROGRESS NOTE ADULT - SUBJECTIVE AND OBJECTIVE BOX
Pt seen and examined at bedside.        VITAL SIGNS (Last 24 hrs):  T(C): 36.9 (08-27-24 @ 14:56), Max: 37.6 (08-26-24 @ 23:04)  HR: 80 (08-27-24 @ 14:56) (69 - 85)  BP: 118/55 (08-27-24 @ 14:56) (90/49 - 128/77)  RR: 18 (08-27-24 @ 14:56) (18 - 20)  SpO2: 96% (08-27-24 @ 14:56) (94% - 97%)          I&O's Summary    26 Aug 2024 07:01  -  27 Aug 2024 07:00  --------------------------------------------------------  IN: 0 mL / OUT: 1475 mL / NET: -1475 mL    27 Aug 2024 07:01  -  27 Aug 2024 15:16  --------------------------------------------------------  IN: 0 mL / OUT: 700 mL / NET: -700 mL        PHYSICAL EXAM:  GENERAL: NAD   HEAD:  Atraumatic, Normocephalic  EYES:  conjunctiva and sclera clear  NECK: Supple, No JVD  CHEST/LUNG: Clear to auscultation bilaterally; No wheeze  HEART: Regular rate and rhythm; No murmurs, rubs, or gallops  ABDOMEN: Soft, Nontender, Nondistended; Bowel sounds present  EXTREMITIES:  2+ Peripheral Pulses, No clubbing, cyanosis + LE edema  SKIN: sacral ulcer     Labs Reviewed       CBC Full  -  ( 27 Aug 2024 06:39 )  WBC Count : 3.54 K/uL  Hemoglobin : 7.1 g/dL  Hematocrit : 23.0 %  Platelet Count - Automated : 140 K/uL  Mean Cell Volume : 99.6 fL  Mean Cell Hemoglobin : 30.7 pg  Mean Cell Hemoglobin Concentration : 30.9 g/dL  Auto Neutrophil # : 2.42 K/uL  Auto Lymphocyte # : 0.54 K/uL  Auto Monocyte # : 0.36 K/uL  Auto Eosinophil # : 0.18 K/uL  Auto Basophil # : 0.01 K/uL  Auto Neutrophil % : 68.3 %  Auto Lymphocyte % : 15.3 %  Auto Monocyte % : 10.2 %  Auto Eosinophil % : 5.1 %  Auto Basophil % : 0.3 %    BMP:    08-27 @ 06:39    Blood Urea Nitrogen - 81  Calcium - 8.0  Carbond Dioxide - 28  Chloride - 103  Creatinine - 2.1  Glucose - 94  Potassium - 4.6  Sodium - 139      Hemoglobin A1c -     Urine Culture:  08-23 @ 11:05 Urine culture: --    Culture Results:   Few Proteus mirabilis (Carbapenem Resistant)  Moderate Klebsiella pneumoniae (Carbapenem Resistant)  Moderate Enterococcus faecalis  Few Enterococcus avium  Few Enterococcus faecium  Method Type: BAUTISTA  Organism: Enterococcus faecalis  Organism Identification: Proteus mirabilis (Carbapenem Resistant)  Proteus mirabilis (Carbapenem Resistant)  Klebsiella pneumoniae (Carbapenem Resistant)  Klebsiella pneumoniae (Carbapenem Resistant)  Enterococcus faecalis  Enterococcus avium  Enterococcus faecium  Specimen Source: Surgical Swab None            MEDICATIONS  (STANDING):  acetaminophen     Tablet .. 1000 milliGRAM(s) Oral every 8 hours  buMETAnide 1 milliGRAM(s) Oral two times a day  chlorhexidine 2% Cloths 1 Application(s) Topical daily  epoetin raquel (EPOGEN) Injectable 78331 Unit(s) SubCutaneous every 7 days  folic acid 1 milliGRAM(s) Oral daily  lactulose Syrup 20 Gram(s) Oral every 6 hours  midodrine. 10 milliGRAM(s) Oral three times a day  pantoprazole    Tablet 40 milliGRAM(s) Oral before breakfast  polyethylene glycol 3350 17 Gram(s) Oral daily  senna 2 Tablet(s) Oral at bedtime  sucralfate 1 Gram(s) Oral every 12 hours    MEDICATIONS  (PRN):  HYDROmorphone  Injectable 0.5 milliGRAM(s) IV Push every 4 hours PRN Severe Pain (7 - 10)

## 2024-08-27 NOTE — PROGRESS NOTE ADULT - ASSESSMENT
78-year-old female with past medical history of   CKD, HTN, CCY, gout,  severe AS being planned for TAVR, and history of severe anemia secondary to chronic upper GI bleeding due to  AV malformation gastric body dieulafoy lesion, weekly blood transfusions presenting to ER for a rupture pressure ulcer of the buttock area.  #EVAN on CKD4/ anemia   #AMS  - creat noted at baseline from 4/2024 stable at mid 2's/ please repeat cr today   - no  need for HD for now last HD was 8/15  - hold bumex today / non oliguric   - no hydro on renal/bladder sono   - cont WOODY wkly, Tsat low/ no venofer on atb   - followed by burn   - ph at goal   - bp noted / continue midodrine   - overall prognosis poor   will sign off recall as needed

## 2024-08-27 NOTE — PROGRESS NOTE ADULT - ASSESSMENT
78-year-old female with past medical history of   CKD, HTN, CCY, gout,  severe AS being planned for tAVR, and history of severe anemia secondary to chronic upper GI bleeding due to  AV malformation gastric body dieulafoy lesion, weekly blood transfusions presenting to ER for a rupture pressure ulcer of the buttock area. Admitted for metabolic encephalopathy 2/2 uremia vs hyperammonemia, started on HD.  Mental status improved. now currently monitoring off HD.        # Metabolic encephalopathy - resolved   # Acute kidney injury on CKD stage 4  # Hyperammonemia - per GI, not hepatic encephalopathy   - mental status improved now, no need for brain MRI  - currently monitoring off HD, [place] removed   - on Lactulose   - c/w Bumex 1 mg Q 12 hours (hold for hypotension)     # Sacrum: pressure ulcer with necrosis/ localized abscess/cellulitis  - 8/14 s/p bedside debridement of bilateral buttocks by Dr Schwarz   - s/p debridement in OR by BURN 8/23 - cleared by BURN for discharge   - continue local wound care bid: wash wounds with soap and water, apply hydrogel, cover with Vashe moist gauze, then apply ABD and tape.  - pain management - Tylenol ATC, Dilaudid PRN   - offloading, position changes  - no abx per ID   - wound culture growing multiple organisms - likely colonizers, needs isolation for MDR   - c/w Arora     # Acute on chronic  anemia/ History of AVMs (gastric and duodenal) and Dieulafoy Lesions Status Post Hemostasis in 2023/ History of perisplenic Varices in 2023   # DARRIN due to blood loss   - s/p EGD/colonoscopy/push enteroscopy on 7/22 showing AVMs and polyps  - s/p pRBC on 8/2, 8/11, 8/16, and today 8/27   - s/p course of Venofer  - on weekly IV transfusion/iron infusions per hematology  - monitor h/h, keep active T&S  - keep Hb above 7.5   - hgb downtrending again, GI f/u, 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 Mild asymmetric left ventricular hypertrophy. Grade II diastolic dysfunction). Severe aortic valve stenosis. Mild to moderate mitral valve regurgitation. Moderate aortic regurgitation.  - Patient to follow up with Dr. Hernandez as an outpatient for TAVR  - c/w midodrine 10 q8H  - hold metoprolol for hypotension     DVT ppx     #Progress Note Handoff  Pending (specify): Monitor hgb, transfuse one unit, GI f/u    Spoke to susan Acuña over the phone    Dispo: [facility]

## 2024-08-27 NOTE — PROGRESS NOTE ADULT - SUBJECTIVE AND OBJECTIVE BOX
seen and examined  24 h events noted   no distress       PAST HISTORY  --------------------------------------------------------------------------------  No significant changes to PMH, PSH, FHx, SHx, unless otherwise noted    ALLERGIES & MEDICATIONS  --------------------------------------------------------------------------------  Allergies    aspirin (Rash; Other)  penicillins (Rash; Urticaria; Hives; Blisters)  latex (Unknown)  EKG leads (Hives)    Intolerances      Standing Inpatient Medications  acetaminophen     Tablet .. 1000 milliGRAM(s) Oral every 8 hours  buMETAnide 1 milliGRAM(s) Oral two times a day  chlorhexidine 2% Cloths 1 Application(s) Topical daily  epoetin raquel (EPOGEN) Injectable 99097 Unit(s) SubCutaneous every 7 days  folic acid 1 milliGRAM(s) Oral daily  heparin   Injectable 5000 Unit(s) SubCutaneous every 12 hours  lactulose Syrup 20 Gram(s) Oral every 6 hours  midodrine. 10 milliGRAM(s) Oral three times a day  pantoprazole    Tablet 40 milliGRAM(s) Oral before breakfast  polyethylene glycol 3350 17 Gram(s) Oral daily  senna 2 Tablet(s) Oral at bedtime  sucralfate 1 Gram(s) Oral every 12 hours    PRN Inpatient Medications  HYDROmorphone  Injectable 0.5 milliGRAM(s) IV Push every 4 hours PRN          VITALS/PHYSICAL EXAM  --------------------------------------------------------------------------------  T(C): 37.3 (08-27-24 @ 08:14), Max: 37.6 (08-26-24 @ 23:04)  HR: 77 (08-27-24 @ 08:14) (69 - 80)  BP: 118/57 (08-27-24 @ 08:14) (90/49 - 128/77)  RR: 18 (08-27-24 @ 08:14) (18 - 19)  SpO2: 94% (08-26-24 @ 23:04) (94% - 97%)  Wt(kg): --        08-26-24 @ 07:01  -  08-27-24 @ 07:00  --------------------------------------------------------  IN: 0 mL / OUT: 1475 mL / NET: -1475 mL      Physical Exam:  	Gen: NAD  	Pulm: CTA B/L  	CV: S1S2; no rub  	Abd: +distended  	    LABS/STUDIES  --------------------------------------------------------------------------------              7.1    3.54  >-----------<  140      [08-27-24 @ 06:39]              23.0     139  |  103  |  81  ----------------------------<  94      [08-27-24 @ 06:39]  4.6   |  28  |  2.1        Ca     8.0     [08-27-24 @ 06:39]      Phos  4.7     [08-26-24 @ 09:40]    TPro  4.8  /  Alb  2.4  /  TBili  0.4  /  DBili  x   /  AST  22  /  ALT  6   /  AlkPhos  71  [08-26-24 @ 08:54]      Creatinine Trend:  SCr 2.1 [08-27 @ 06:39]  SCr 2.4 [08-26 @ 08:54]  SCr 2.5 [08-24 @ 07:46]  SCr 2.6 [08-23 @ 06:11]  SCr 2.6 [08-22 @ 07:08]    Urinalysis - [08-27-24 @ 06:39]      Color  / Appearance  / SG  / pH       Gluc 94 / Ketone   / Bili  / Urobili        Blood  / Protein  / Leuk Est  / Nitrite       RBC  / WBC  / Hyaline  / Gran  / Sq Epi  / Non Sq Epi  / Bacteria       Iron 34, TIBC 150, %sat 23      [08-15-24 @ 10:36]  Ferritin 675      [08-15-24 @ 10:36]  TSH 4.36      [08-08-24 @ 09:40]    HBsAb Nonreact      [08-11-24 @ 11:39]  HBsAg Nonreact      [08-11-24 @ 11:39]  HBcAb Nonreact      [08-11-24 @ 11:39]

## 2024-08-28 ENCOUNTER — TRANSCRIPTION ENCOUNTER (OUTPATIENT)
Age: 79
End: 2024-08-28

## 2024-08-28 VITALS
OXYGEN SATURATION: 93 % | HEART RATE: 70 BPM | RESPIRATION RATE: 18 BRPM | TEMPERATURE: 98 F | DIASTOLIC BLOOD PRESSURE: 53 MMHG | SYSTOLIC BLOOD PRESSURE: 106 MMHG

## 2024-08-28 LAB
-  AZTREONAM/AVIBACTAM: SIGNIFICANT CHANGE UP
-  AZTREONAM/AVIBACTAM: SIGNIFICANT CHANGE UP
-  CEFIDEROCOL: SIGNIFICANT CHANGE UP
-  CEFIDEROCOL: SIGNIFICANT CHANGE UP
ANION GAP SERPL CALC-SCNC: 9 MMOL/L — SIGNIFICANT CHANGE UP (ref 7–14)
BASOPHILS # BLD AUTO: 0.01 K/UL — SIGNIFICANT CHANGE UP (ref 0–0.2)
BASOPHILS NFR BLD AUTO: 0.2 % — SIGNIFICANT CHANGE UP (ref 0–1)
BUN SERPL-MCNC: 76 MG/DL — CRITICAL HIGH (ref 10–20)
CALCIUM SERPL-MCNC: 8.1 MG/DL — LOW (ref 8.4–10.5)
CHLORIDE SERPL-SCNC: 105 MMOL/L — SIGNIFICANT CHANGE UP (ref 98–110)
CO2 SERPL-SCNC: 28 MMOL/L — SIGNIFICANT CHANGE UP (ref 17–32)
CREAT SERPL-MCNC: 2.2 MG/DL — HIGH (ref 0.7–1.5)
EGFR: 22 ML/MIN/1.73M2 — LOW
EOSINOPHIL # BLD AUTO: 0.24 K/UL — SIGNIFICANT CHANGE UP (ref 0–0.7)
EOSINOPHIL NFR BLD AUTO: 4.9 % — SIGNIFICANT CHANGE UP (ref 0–8)
GLUCOSE SERPL-MCNC: 88 MG/DL — SIGNIFICANT CHANGE UP (ref 70–99)
HCT VFR BLD CALC: 27 % — LOW (ref 37–47)
HGB BLD-MCNC: 8.3 G/DL — LOW (ref 12–16)
IMM GRANULOCYTES NFR BLD AUTO: 0.6 % — HIGH (ref 0.1–0.3)
LDH SERPL L TO P-CCNC: 178 — SIGNIFICANT CHANGE UP (ref 50–242)
LYMPHOCYTES # BLD AUTO: 0.55 K/UL — LOW (ref 1.2–3.4)
LYMPHOCYTES # BLD AUTO: 11.3 % — LOW (ref 20.5–51.1)
MCHC RBC-ENTMCNC: 30.3 PG — SIGNIFICANT CHANGE UP (ref 27–31)
MCHC RBC-ENTMCNC: 30.7 G/DL — LOW (ref 32–37)
MCV RBC AUTO: 98.5 FL — SIGNIFICANT CHANGE UP (ref 81–99)
METHOD TYPE: SIGNIFICANT CHANGE UP
MONOCYTES # BLD AUTO: 0.53 K/UL — SIGNIFICANT CHANGE UP (ref 0.1–0.6)
MONOCYTES NFR BLD AUTO: 10.9 % — HIGH (ref 1.7–9.3)
NEUTROPHILS # BLD AUTO: 3.5 K/UL — SIGNIFICANT CHANGE UP (ref 1.4–6.5)
NEUTROPHILS NFR BLD AUTO: 72.1 % — SIGNIFICANT CHANGE UP (ref 42.2–75.2)
NRBC # BLD: 0 /100 WBCS — SIGNIFICANT CHANGE UP (ref 0–0)
PLATELET # BLD AUTO: 128 K/UL — LOW (ref 130–400)
PMV BLD: 9.3 FL — SIGNIFICANT CHANGE UP (ref 7.4–10.4)
POTASSIUM SERPL-MCNC: 4.7 MMOL/L — SIGNIFICANT CHANGE UP (ref 3.5–5)
POTASSIUM SERPL-SCNC: 4.7 MMOL/L — SIGNIFICANT CHANGE UP (ref 3.5–5)
RBC # BLD: 2.74 M/UL — LOW (ref 4.2–5.4)
RBC # FLD: 18.8 % — HIGH (ref 11.5–14.5)
SODIUM SERPL-SCNC: 142 MMOL/L — SIGNIFICANT CHANGE UP (ref 135–146)
WBC # BLD: 4.86 K/UL — SIGNIFICANT CHANGE UP (ref 4.8–10.8)
WBC # FLD AUTO: 4.86 K/UL — SIGNIFICANT CHANGE UP (ref 4.8–10.8)

## 2024-08-28 PROCEDURE — 99239 HOSP IP/OBS DSCHRG MGMT >30: CPT

## 2024-08-28 RX ORDER — BUMETANIDE 2 MG/1
1 TABLET ORAL
Qty: 60 | Refills: 0
Start: 2024-08-28 | End: 2024-09-26

## 2024-08-28 RX ORDER — LACTULOSE 10 G
30 PACKET (EA) ORAL
Qty: 0 | Refills: 0 | DISCHARGE
Start: 2024-08-28

## 2024-08-28 RX ORDER — MIDODRINE HYDROCHLORIDE 5 MG/1
1 TABLET ORAL
Qty: 0 | Refills: 0 | DISCHARGE
Start: 2024-08-28

## 2024-08-28 RX ORDER — FOLIC ACID 1 MG
1 TABLET ORAL
Qty: 0 | Refills: 0 | DISCHARGE
Start: 2024-08-28

## 2024-08-28 RX ORDER — SUCRALFATE 1 G/10ML
1 SUSPENSION ORAL
Qty: 0 | Refills: 0 | DISCHARGE
Start: 2024-08-28

## 2024-08-28 RX ORDER — POLYETHYLENE GLYCOL 3350 17 G/17G
17 POWDER, FOR SOLUTION ORAL
Qty: 0 | Refills: 0 | DISCHARGE
Start: 2024-08-28

## 2024-08-28 RX ORDER — FERROUS SULFATE 325(65) MG
1 TABLET ORAL
Qty: 30 | Refills: 0
Start: 2024-08-28 | End: 2024-09-26

## 2024-08-28 RX ORDER — HEPARIN SODIUM,PORCINE/PF 100/ML (1)
300 VIAL (ML) INTRAVENOUS ONCE
Refills: 0 | Status: COMPLETED | OUTPATIENT
Start: 2024-08-28 | End: 2024-08-28

## 2024-08-28 RX ORDER — SENNA 187 MG
2 TABLET ORAL
Qty: 0 | Refills: 0 | DISCHARGE
Start: 2024-08-28

## 2024-08-28 RX ADMIN — CHLORHEXIDINE GLUCONATE 1 APPLICATION(S): 40 SOLUTION TOPICAL at 11:03

## 2024-08-28 RX ADMIN — Medication 20 GRAM(S): at 11:03

## 2024-08-28 RX ADMIN — Medication 300 UNIT(S): at 16:35

## 2024-08-28 RX ADMIN — SUCRALFATE 1 GRAM(S): 1 SUSPENSION ORAL at 05:08

## 2024-08-28 RX ADMIN — ACETAMINOPHEN 1000 MILLIGRAM(S): 325 TABLET ORAL at 05:08

## 2024-08-28 RX ADMIN — Medication 40 MILLIGRAM(S): at 05:09

## 2024-08-28 RX ADMIN — Medication 20 GRAM(S): at 17:20

## 2024-08-28 RX ADMIN — MIDODRINE HYDROCHLORIDE 10 MILLIGRAM(S): 5 TABLET ORAL at 17:21

## 2024-08-28 RX ADMIN — ACETAMINOPHEN 1000 MILLIGRAM(S): 325 TABLET ORAL at 13:04

## 2024-08-28 RX ADMIN — HYDROMORPHONE HYDROCHLORIDE 0.5 MILLIGRAM(S): 2 TABLET ORAL at 05:07

## 2024-08-28 RX ADMIN — MIDODRINE HYDROCHLORIDE 10 MILLIGRAM(S): 5 TABLET ORAL at 05:08

## 2024-08-28 RX ADMIN — Medication 20 GRAM(S): at 00:32

## 2024-08-28 RX ADMIN — Medication 1 MILLIGRAM(S): at 11:01

## 2024-08-28 RX ADMIN — Medication 20 GRAM(S): at 05:08

## 2024-08-28 RX ADMIN — SUCRALFATE 1 GRAM(S): 1 SUSPENSION ORAL at 17:21

## 2024-08-28 RX ADMIN — ACETAMINOPHEN 1000 MILLIGRAM(S): 325 TABLET ORAL at 14:39

## 2024-08-28 RX ADMIN — POLYETHYLENE GLYCOL 3350 17 GRAM(S): 17 POWDER, FOR SOLUTION ORAL at 11:03

## 2024-08-28 RX ADMIN — MIDODRINE HYDROCHLORIDE 10 MILLIGRAM(S): 5 TABLET ORAL at 11:01

## 2024-08-28 NOTE — ADVANCED PRACTICE NURSE CONSULT - ASSESSMENT
History of Present Illness:   Patient is a 78-year-old female with past medical history of   CKD, HTN, CCY, gout,  severe AS being planned for tAVR, and history of severe anemia secondary to chronic upper GI bleeding due to  AV malformation gastric body dieulafoy lesion, weekly blood transfusions presenting to ER for a rupture pressure ulcer of the buttock area today.   Patient states that last night was on her toilet which cracked while she was sitting on it, and patient was unable to get off the toilet for 4 hours.  By the time EMS arrived skin had ulcerated, with a blister that ruptured predominantly to the left buttock area (appears to be like burn blister that ruptured) with linear abrasions to the bilateral upper thighs/buttocks.  Patient in so much pain that she cannot stand or walk, Of note, pt was recently admitted to the hospital between 7/13-7/23 for hematochezia, s/p 1 unit prbc. Denies fever, chills. N/V, abd pain. Admitted for wound management and placement as pt can not care for her own ADLs.    Allergies and Intolerances:        Allergies:  	penicillins: Drug Category, Rash, Urticaria, Hives, Blisters  	EKG leads [freetext allergy; no alerts]: Miscellaneous, Hives, EKG leads  	aspirin: Drug, Rash, Other, bleeding  	latex: Latex, Unknown    Home Medications:   * Patient Currently Takes Medications as of 23-Jul-2024 18:15 documented in Structured Notes  · 	predniSONE 20 mg oral tablet: 2 tab(s) orally once a day  · 	furosemide 40 mg oral tablet: 1 tab(s) orally once a day  · 	pantoprazole 40 mg oral delayed release tablet: 1 tab(s) orally 2 times a day  · 	metoprolol tartrate 25 mg oral tablet: 1 tab(s) orally once a day    Patient received lying in bed. Awake. Limited mobility. High risk for pressure injury development and progression.    Type of Wound: Evolving Deep Tissue Injury  Location: Bilateral buttocks and sacrum region (mimicking toilet seat)  Tunneling/ Undermining: No  Wound bed: Dark purple with areas of epidermal skin loss  Wound edges: Intact  Periwound: Intact  Wound exudate: None  Wound odor: No  Induration, erythema, warmth: No  Wound pain: No    Skin to bilateral heels intact at time of assessment.   
History of Present Illness:   Patient is a 78-year-old female with past medical history of   CKD, HTN, CCY, gout,  severe AS being planned for tAVR, and history of severe anemia secondary to chronic upper GI bleeding due to  AV malformation gastric body dieulafoy lesion, weekly blood transfusions presenting to ER for a rupture pressure ulcer of the buttock area today.   Patient states that last night was on her toilet which cracked while she was sitting on it, and patient was unable to get off the toilet for 4 hours.  By the time EMS arrived skin had ulcerated, with a blister that ruptured predominantly to the left buttock area (appears to be like burn blister that ruptured) with linear abrasions to the bilateral upper thighs/buttocks.  Patient in so much pain that she cannot stand or walk, Of note, pt was recently admitted to the hospital between 7/13-7/23 for hematochezia, s/p 1 unit prbc. Denies fever, chills. N/V, abd pain. Admitted for wound management and placement as pt can not care for her own ADLs.    Allergies and Intolerances:        Allergies:  	penicillins: Drug Category, Rash, Urticaria, Hives, Blisters  	EKG leads [freetext allergy; no alerts]: Miscellaneous, Hives, EKG leads  	aspirin: Drug, Rash, Other, bleeding  	latex: Latex, Unknown    Home Medications:   * Patient Currently Takes Medications as of 23-Jul-2024 18:15 documented in Structured Notes  · 	predniSONE 20 mg oral tablet: 2 tab(s) orally once a day  · 	furosemide 40 mg oral tablet: 1 tab(s) orally once a day  · 	pantoprazole 40 mg oral delayed release tablet: 1 tab(s) orally 2 times a day  · 	metoprolol tartrate 25 mg oral tablet: 1 tab(s) orally once a day    Patient received lying in bed. Alert and awake. Limited mobility. Incontinent of stool. Arora in place. High BMI. High risk for pressure injury development and progression.    Dry irregular hyperpigmented tissue to right heel.     Skin to bilateral feet dry, flaky.     Burn following for wounds to bilateral buttocks.

## 2024-08-28 NOTE — PROGRESS NOTE ADULT - SUBJECTIVE AND OBJECTIVE BOX
LATRICIA ARREAGA  78y  [facility] 4B 013 B      Patient is a 78y old  Female who presents with a chief complaint of sacral wound (15 Aug 2024 08:25)      INTERVAL HPI/OVERNIGHT EVENTS:        REVIEW OF SYSTEMS:        FAMILY HISTORY:  FH: kidney disease (Father)    FH: breast cancer (Mother)    FH: multiple myeloma (Mother)      T(C): 36.2 (08-28-24 @ 07:30), Max: 38 (08-27-24 @ 21:56)  HR: 70 (08-28-24 @ 07:30) (70 - 85)  BP: 131/66 (08-28-24 @ 07:30) (97/58 - 131/66)  RR: 18 (08-28-24 @ 07:30) (18 - 20)  SpO2: 96% (08-28-24 @ 07:30) (95% - 96%)  Wt(kg): --Vital Signs Last 24 Hrs  T(C): 36.2 (28 Aug 2024 07:30), Max: 38 (27 Aug 2024 21:56)  T(F): 97.1 (28 Aug 2024 07:30), Max: 100.4 (27 Aug 2024 21:56)  HR: 70 (28 Aug 2024 07:30) (70 - 85)  BP: 131/66 (28 Aug 2024 07:30) (97/58 - 131/66)  BP(mean): --  RR: 18 (28 Aug 2024 07:30) (18 - 20)  SpO2: 96% (28 Aug 2024 07:30) (95% - 96%)    Parameters below as of 28 Aug 2024 07:30  Patient On (Oxygen Delivery Method): room air        PHYSICAL EXAM:  GENERAL: NAD, well-groomed, well-developed  HEAD:  Atraumatic, Normocephalic  EYES: EOMI, PERRLA, conjunctiva and sclera clear  ENMT: No tonsillar erythema, exudates, or enlargement; Moist mucous membranes, Good dentition, No lesions  NECK: Supple, No JVD, Normal thyroid  NERVOUS SYSTEM:  Alert & Oriented X3, Good concentration; Motor Strength 5/5 B/L upper and lower extremities; DTRs 2+ intact and symmetric  PULM: Clear to auscultation bilaterally  CARDIAC: Regular rate and rhythm; No murmurs, rubs, or gallops  GI: Soft, Nontender, Nondistended; Bowel sounds present  EXTREMITIES:  2+ Peripheral Pulses, No clubbing, cyanosis, or edema  LYMPH: No lymphadenopathy noted  SKIN: No rashes or lesions    Consultant(s) Notes Reviewed:  [x ] YES  [ ] NO  Care Discussed with Consultants/Other Providers [ x] YES  [ ] NO    LABS:                            8.3    4.86  )-----------( 128      ( 28 Aug 2024 06:15 )             27.0   08-28    142  |  105  |  76<HH>  ----------------------------<  88  4.7   |  28  |  2.2<H>    Ca    8.1<L>      28 Aug 2024 06:15              acetaminophen     Tablet .. 1000 milliGRAM(s) Oral every 8 hours  chlorhexidine 2% Cloths 1 Application(s) Topical daily  epoetin raquel (EPOGEN) Injectable 67045 Unit(s) SubCutaneous every 7 days  folic acid 1 milliGRAM(s) Oral daily  HYDROmorphone  Injectable 0.5 milliGRAM(s) IV Push every 4 hours PRN  lactulose Syrup 20 Gram(s) Oral every 6 hours  midodrine. 10 milliGRAM(s) Oral three times a day  pantoprazole    Tablet 40 milliGRAM(s) Oral before breakfast  polyethylene glycol 3350 17 Gram(s) Oral daily  senna 2 Tablet(s) Oral at bedtime  sucralfate 1 Gram(s) Oral every 12 hours        78-year-old female with past medical history of   CKD, HTN, CCY, gout,  severe AS being planned for tAVR, and history of severe anemia secondary to chronic upper GI bleeding due to  AV malformation gastric body dieulafoy lesion, weekly blood transfusions presenting to ER for a rupture pressure ulcer of the buttock area. Admitted for metabolic encephalopathy 2/2 uremia vs hyperammonemia, started on HD.  Mental status improved. now currently monitoring off HD.        1.Metabolic encephalopathy - resolved  due to  Acute kidney injury on CKD stage 4  * Hyperammonemia - per GI, not hepatic encephalopathy   - mental status improved now, no need for brain MRI  - currently monitoring off HD, [place] removed   - on Lactulose   - c/w Bumex 1 mg Q 12 hours (hold for hypotension)     2. Sacrum: pressure ulcer with necrosis/ localized abscess/cellulitis  - 8/14 s/p bedside debridement of bilateral buttocks by Dr Schwarz   - s/p debridement in OR by BURN 8/23 - cleared by BURN for discharge   - continue local wound care bid: wash wounds with soap and water, apply hydrogel, cover with Vashe moist gauze, then apply ABD and tape.  - pain management - Tylenol ATC, Dilaudid PRN   - offloading, position changes  - no abx per ID   - wound culture growing multiple organisms - likely colonizers, needs isolation for MDR   - c/w Arora     3. 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 Asymptomatic bacteruria with ESBL Enterobacter with Arora catheter  - s/p 6 days of meropenem     5. Prolonged QTc - resolving   - repeat EKG daily   - K above 4, mg above 2.2   - avoid QTc prolonging meds     6.  Chronic diastolic CHF/ Severe aortic stenosis   - TTE Echo Complete w/o Contrast w/ Doppler (08.01.24 @ 11:03) >EF of 56 %. Mild asymmetric left ventricular hypertrophy. Grade II diastolic dysfunction). Severe aortic valve stenosis. Mild to moderate mitral valve regurgitation. Moderate aortic regurgitation.  - Patient to follow up with Dr. Hernandez as an outpatient for TAVR  - c/w midodrine 10 q8H  - hold metoprolol for hypotension     DVT ppx     #Progress Note Handoff  Pending (specify): Monitor hgb, transfuse one unit, GI f/u    Spoke to susan Acuña over the phone    Dispo: Jacqui SAGE

## 2024-08-28 NOTE — PROGRESS NOTE ADULT - PROVIDER SPECIALTY LIST ADULT
Burn
Burn
Hospitalist
Internal Medicine
Nephrology
Hospitalist
Internal Medicine
Internal Medicine
Nephrology
Hospitalist
Internal Medicine
Nephrology
Neurology
Neurology
Critical Care
Hospitalist
Burn
Infectious Disease
Infectious Disease

## 2024-08-28 NOTE — DISCHARGE NOTE NURSING/CASE MANAGEMENT/SOCIAL WORK - PATIENT PORTAL LINK FT
You can access the FollowMyHealth Patient Portal offered by [facility] by registering at the following website: [email or web address]. By joining [facility]’s FollowMyHealth portal, you will also be able to view your health information using other applications (apps) compatible with our system.

## 2024-08-28 NOTE — ADVANCED PRACTICE NURSE CONSULT - RECOMMEDATIONS
Cleanse right heel with soap and water.   Pat dry, apply Cavilon skin prep, leave open to air twice a day and prn for soiling.   Offload heels    Maintain pressure injury prevention.   Keep skin clean.   Maintain incontinence care.   Monitor skin for changes and notify provider   Case discussed with primary RN Maintain pressure injury prevention.   Keep skin clean.   Maintain incontinence care.   Monitor skin for changes and notify provider   Case discussed with primary RN

## 2024-08-28 NOTE — PATIENT PROFILE ADULT - HOW PATIENT ADDRESSED, PROFILE
Mel Fever greater than (need to indicate Fahrenheit or Celsius)/Nausea and vomiting that does not stop/Unable to urinate

## 2024-08-29 LAB
CULTURE RESULTS: ABNORMAL
ORGANISM # SPEC MICROSCOPIC CNT: ABNORMAL
ORGANISM # SPEC MICROSCOPIC CNT: SIGNIFICANT CHANGE UP
SPECIMEN SOURCE: SIGNIFICANT CHANGE UP

## 2024-08-30 ENCOUNTER — APPOINTMENT (OUTPATIENT)
Age: 79
End: 2024-08-30

## 2024-09-06 ENCOUNTER — APPOINTMENT (OUTPATIENT)
Age: 79
End: 2024-09-06

## 2024-09-07 NOTE — PROGRESS NOTE ADULT - PROVIDER SPECIALTY LIST ADULT
Assessment and Plan:   · Assessment	  36 yo M with above PMHx presented with symptomatic PAF w/RVR.  Known Nonischemic CMP LVEF 35-40%   - Continue  tikosyn 250mcg po q12hrs on 9/5, 12 leads EKG in 2 hrs post each dose for QTC.  -call EP if QTC>500ms or >50ms from baseline.  - Avoid QT prolonging agents. keep K+>4, Mg>2. Pt needs 6 doses in-pt loading, received 5/6 dose today  ( No Copay for pt)   - continue  toprol to 100mg daily  - uninterrupted eliquis  - episodes of nocturnal junctional rhythm, pt has PAO on CPAP  - GDMT for chronic HFrEF, will repeat echo as outpt while in SR  - will consider AF ablation as out pt  - f/u in EP clinic on 9/13 @10:45AM  for EKG ( appt given to pt)  Plan d/w pt//cardio/hospitalist
Heme/Onc
Hospitalist
Internal Medicine
Hospitalist
Internal Medicine
Internal Medicine

## 2024-09-09 ENCOUNTER — RESULT REVIEW (OUTPATIENT)
Age: 79
End: 2024-09-09

## 2024-09-13 ENCOUNTER — APPOINTMENT (OUTPATIENT)
Age: 79
End: 2024-09-13

## 2024-09-17 NOTE — ED ADULT NURSE NOTE - NSSEPSISSUSPECTED_ED_A_ED
Subjective    Patient ID: Titus Inman II is a 26 y.o. male.    Chief Complaint: Bilateral hand pain and numbness    HPI    Titus is a pleasant 26-year-old gentleman presenting for FUV of bilateral hand pain and numbness  Patient uses compression gloves, weighted and heated mittens and nighttime wrist bracing  Medical cannabis helps some (uses mainly for PTSD)  Works in factory work and cooking, aggravates sx  RH dominant  Frequent popping of joints   Worse after working with hands  Patient did complete EMG, reports OT reported as within normal limits  Patient is performing home exercises on daily basis and tolerating fair to well  Overall minimal change in pain or numb and tingling to bilateral hands  Occasional sharp shooting pains, mainly affecting the left hand from wrist distally  Meloxicam - not taking regularly, does not note any improvement when using  Describes sensory decrease in L hand  Portz concern for weak hand  and grasp strength, using upper arm muscles to accommodate    Review of Systems   Constitutional: Negative.    HENT: Negative.     Respiratory: Negative.     Cardiovascular: Negative.    Endocrine: Negative.    Musculoskeletal:  Positive for arthralgias.   Skin: Negative.    Neurological: Negative.    Hematological: Negative.    Psychiatric/Behavioral: Negative.        Objective   Right Hand Exam     Tenderness   Right hand tenderness location: Mild symptom aggravation with compression of webspace between thumb and index finger.    Range of Motion   The patient has normal right wrist ROM.     Tests   Phalen’s Sign: negative  Tinel's sign (median nerve): negative  Finkelstein's test: negative    Other   Erythema: absent  Sensation: normal  Pulse: present    Comments:  Full ROM of distal joints with no sx aggravation, distal motor and sensory intact, cap refill at 2 seconds.      Left Hand Exam     Tenderness   Left hand tenderness location: Generalized distal wrist through hand and all  fingers.     Range of Motion   The patient has normal left wrist ROM.    Tests   Tinel's sign (median nerve): positive  Finkelstein's test: negative    Other   Erythema: absent  Sensation: normal  Pulse: present    Comments:  Full ROM of distal joints with no sx aggravation, distal motor and sensory intact, cap refill at 2 seconds.          Image Results:  === 07/11/24 ===    XR WRIST 3+ VIEWS BILATERAL    - Impression -  1. Unremarkable bilateral wrist radiographs.    MACRO:  None.    Signed by: Carmen Burgos 7/12/2024 6:40 PM  Dictation workstation:   [id number]    Nerve Conduction & EMG Report      Patient: Titus Inman   Patient ID: [id number]  Sex: Male  YOB: 1998    Visit Date: 8/12/2024 2:43 PM  Age: 26 Years  Examining MD: Cholo Garcia MD  Referred by: Shannan Lira DO  Temperature: 32.8  Current Height: 5 feet 7 inch  Referred for: EMG: BUE dropping things on the left side, sore and pain on the right side. Occ neck pain. No DM. NKDA.    Plan: This study is design to evaluate for entrapment neuropathy, median or ulnar neuropathy, radiculopathy, or brachial plexopathy. Procedure indication, side effects, complications, risk and alternatives were explain. Patient agreed to proceed with verbal consent obtain. Patient was instructed to clean the puncture site with soap and water and put some ice pack for bruising.     Impression: This is a normal EMG. There is NO clear electrodiagnostic evidence of a bilateral cervical radiculopathy, brachial plexopathy, entrapment neuropathy, median or ulnar neuropathy at this time.    EMG Summary: The bilateral median and ulnar motor and sensory nerve conduction studies were normal. The bilateral radial sensory nerve conduction studies were also normal.     Needle EMG of the tested muscle showed no abnormal spontaneous activity. Normal motor unit action potentials and recruitment patterns were seen.    Cholo Garcia MD  Diplomate, ABPN,  NBPAS  Clinical Neurophysiology, Neurology, Vascular Neurology and Sleep Medicine  [facility], [place], [place]  [phone]     Reviewed EMG report and discussed findings with patient for interpretation    Reviewed most recent OT appointment from 9/5/2024 on date of visit.  Per notes, patient is noticing some improvement in pain, no change in numb tingling sensation.    Reviewed lab workup from PCP from June 2024 of hemoglobin A1c, TSH, CBC, lipids, CMP.    Assessment/Plan   Encounter Diagnoses:    Problem List Items Addressed This Visit             ICD-10-CM    Bilateral hand numbness - Primary R20.0     Patient to continue use with compression gloves, weighted and heated mittens and nighttime bracing for symptom control  Complete OT eval visit for further recommendation  Continue home exercises daily as instructed  Referring for neurology evaluation, patient states he has seen a neurologist recently and is waiting for a follow-up appointment.  Referral to address hands provided.  PCP will be included on this note, reviewed labs from initial workup in June 2024 with no significantly abnormal findings.  We did discuss possibility of addressing vitamin levels either with PCP or neurology  Collaborated case with Dr. Bourgeois on date of visit who is in agreement with this plan of care  Patient to follow-up here on as needed basis  Patient in agreement of the plan of care  This note was generated using Dragon software.  It may contain errors in wording, punctuation or spelling.         Relevant Orders    Referral to Neurology    Bilateral hand pain M79.641, M79.642    Relevant Orders    Referral to Neurology            No

## 2024-09-18 NOTE — CDI QUERY NOTE - NSCDIOTHERTXTBX_GEN_ALL_CORE_HH
Clinical documentation and/or evidence of the patient’s presentation, evaluation, and medical management, as evidenced below, may support a diagnosis that is not documented in the medical record.  In order to ensure accurate coding and accuracy of the clinical record, the documentation in this patient’s medical record requires additional clarification.    If you think the supporting documentation and/or clinical evidence supports a more specific diagnosis, please include more specific documentation of a diagnosis associated with these findings in your progress note and/or discharge summary.                                    ======================================  	  Please clarify if the patient was found to have one of the following:    • Necrotic tissue of bilateral buttock pressure soars is associated with gangrenous decubitus ulcer  • Necrotic tissue of bilateral buttock pressure soars is associated with non gangrenous decubitus ulcer	  • Other (specify)      Supporting documentation and/or clinical evidence:   ** 8/14 Operative report:      Postoperative diagnosis: Pressure sore, bilateral buttocks      FINDINGS: Adherent necrotic tissue, bilateral buttocks, excisional debridement with scalpel to and including fascia.      Procedure in detail: Adherent necrotic black tissue was excisionally debrided with scalpel to and including fascia.    ** 8/23 Operative report:       Postoperative diagnosis: Pressure sore of bilateral buttocks       Findings: Necrotic tissue of bilateral buttock excisionally debrided with scalpel to and including the fascia. Clinical documentation and/or evidence of the patient’s presentation, evaluation, and medical management, as evidenced below, may support a diagnosis that is not documented in the medical record.  In order to ensure accurate coding and accuracy of the clinical record, the documentation in this patient’s medical record requires additional clarification.    If you think the supporting documentation and/or clinical evidence supports a more specific diagnosis, please include more specific documentation of a diagnosis associated with these findings in your progress note and/or discharge summary.                                    ======================================  	  Please clarify if the patient was found to have one of the following:    • Necrotic tissue of bilateral buttock pressure soars is associated with gangrenous stage 3 decubitus ulcer  • Necrotic tissue of bilateral buttock pressure soars is associated with non gangrenous stage 3 decubitus ulcer	  • Other (specify)      Supporting documentation and/or clinical evidence:   ** 8/14 Operative report:      Postoperative diagnosis: Pressure sore, bilateral buttocks      FINDINGS: Adherent necrotic tissue, bilateral buttocks, excisional debridement with scalpel to and including fascia.      Procedure in detail: Adherent necrotic black tissue was excisionally debrided with scalpel to and including fascia.    ** 8/23 Operative report:       Postoperative diagnosis: Pressure sore of bilateral buttocks       Findings: Necrotic tissue of bilateral buttock excisionally debrided with scalpel to and including the fascia. Clinical documentation and/or evidence of the patient’s presentation, evaluation, and medical management, as evidenced below, may support a diagnosis that is not documented in the medical record.  In order to ensure accurate coding and accuracy of the clinical record, the documentation in this patient’s medical record requires additional clarification.    If you think the supporting documentation and/or clinical evidence supports a more specific diagnosis, please include more specific documentation of a diagnosis associated with these findings in your progress note and/or discharge summary.                                    ======================================  	  Please clarify if the patient was found to have one of the following:    • Necrotic tissue of bilateral buttock pressure sores is associated with gangrenous stage 3 decubitus ulcer  • Necrotic tissue of bilateral buttock pressure sores is associated with non gangrenous stage 3 decubitus ulcer	  • Other (specify)      Supporting documentation and/or clinical evidence:   ** 8/14 Operative report:      Postoperative diagnosis: Pressure sore, bilateral buttocks      FINDINGS: Adherent necrotic tissue, bilateral buttocks, excisional debridement with scalpel to and including fascia.      Procedure in detail: Adherent necrotic black tissue was excisionally debrided with scalpel to and including fascia.    ** 8/23 Operative report:       Postoperative diagnosis: Pressure sore of bilateral buttocks       Findings: Necrotic tissue of bilateral buttock excisionally debrided with scalpel to and including the fascia.

## 2024-09-20 ENCOUNTER — APPOINTMENT (OUTPATIENT)
Age: 79
End: 2024-09-20

## 2024-09-21 LAB
CULTURE RESULTS: SIGNIFICANT CHANGE UP
SPECIMEN SOURCE: SIGNIFICANT CHANGE UP

## 2024-10-07 NOTE — PATIENT PROFILE ADULT - NSPROGENPREVTRANSFREACT_GEN_A_NUR
Subjective:      Patient ID:  Rosendo Thomas is a 78 y.o. male who presents for   Chief Complaint   Patient presents with    Skin Check    Psoriasis     79 yo male here for annual skin check and psoriasis f/u.  Re: skin check, The patient denies any moles or growths of the skin that are rapidly growing, hurting, itching, bleeding, or changing colors.  Pt does note that his PSO is flaring. Also has arthritis. Has not been on Taltz x many months. Is in more pain now. Was sent by rheum in July but pt never received. Would like to restart.      Review of Systems   Constitutional:  Negative for fever.   HENT:  Negative for sore throat.    Gastrointestinal:  Negative for diarrhea.   Musculoskeletal:  Negative for arthralgias.   Skin:  Positive for itching, rash and wears hat.   Hematologic/Lymphatic: Bruises/bleeds easily.       Objective:   Physical Exam   Constitutional: He appears well-developed and well-nourished. No distress.   Neurological: He is alert and oriented to person, place, and time. He is not disoriented.   Psychiatric: He has a normal mood and affect.   Skin:   Areas Examined (abnormalities noted in diagram):   Scalp / Hair Palpated and Inspected  Head / Face Inspection Performed  Neck Inspection Performed  Chest / Axilla Inspection Performed  Abdomen Inspection Performed  Genitals / Buttocks / Groin Inspection Performed  Back Inspection Performed  RUE Inspected  LUE Inspection Performed  RLE Inspected  LLE Inspection Performed  Nails and Digits Inspection Performed            Diagram Legend     Erythematous scaling macule/papule c/w actinic keratosis       Vascular papule c/w angioma      Pigmented verrucoid papule/plaque c/w seborrheic keratosis      Yellow umbilicated papule c/w sebaceous hyperplasia      Irregularly shaped tan macule c/w lentigo     1-2 mm smooth white papules consistent with Milia      Movable subcutaneous cyst with punctum c/w epidermal inclusion cyst      Subcutaneous  Spoke with patient and informed her of result. Patient is very confused by result as she states she is seeing a lot of blood in stools. She is wondering if she should redo test. Informed her that other test is still pending. Could wait for remaining results and decide at that time if we need to redo any testing. Routed to Dr. Vu as RENEE.    movable cyst c/w pilar cyst      Firm pink to brown papule c/w dermatofibroma      Pedunculated fleshy papule(s) c/w skin tag(s)      Evenly pigmented macule c/w junctional nevus     Mildly variegated pigmented, slightly irregular-bordered macule c/w mildly atypical nevus      Flesh colored to evenly pigmented papule c/w intradermal nevus       Pink pearly papule/plaque c/w basal cell carcinoma      Erythematous hyperkeratotic cursted plaque c/w SCC      Surgical scar with no sign of skin cancer recurrence      Open and closed comedones      Inflammatory papules and pustules      Verrucoid papule consistent consistent with wart     Erythematous eczematous patches and plaques     Dystrophic onycholytic nail with subungual debris c/w onychomycosis     Umbilicated papule    Erythematous-base heme-crusted tan verrucoid plaque consistent with inflamed seborrheic keratosis     Erythematous Silvery Scaling Plaque c/w Psoriasis     See annotation    TBSA 20%    Assessment / Plan:        Skin cancer screening  Total body skin examination performed today including at least 12 points as noted in physical examination. No lesions suspicious for malignancy noted.    Recommend daily sun protection/avoidance, use of at least SPF 30, broad spectrum sunscreen (OTC drug), skin self examinations, and routine physician surveillance to optimize early detection    Psoriasis  -     CBC Auto Differential; Future; Expected date: 10/07/2024  -     Comprehensive Metabolic Panel; Future; Expected date: 10/07/2024  -     QUANTIFERON GOLD TB; Future; Expected date: 10/07/2024    Psoriasis with arthropathy  -     CBC Auto Differential; Future; Expected date: 10/07/2024  -     Comprehensive Metabolic Panel; Future; Expected date: 10/07/2024  -     QUANTIFERON GOLD TB; Future; Expected date: 10/07/2024    Encounter for long-term current use of high risk medication  -     CBC Auto Differential; Future; Expected date: 10/07/2024  -     Comprehensive  Metabolic Panel; Future; Expected date: 10/07/2024  -     QUANTIFERON GOLD TB; Future; Expected date: 10/07/2024    Pt would like to restart Taltz; unclear why lapse in therapy;  Will resend today to [facility]    If not able to obtain will try for another IL-17  Given positive and effective response in the past       Can use TAC cream in the meantime PRN to AA  No follow-ups on file.3 mo   urticaria

## 2024-10-28 ENCOUNTER — APPOINTMENT (OUTPATIENT)
Dept: CARDIOLOGY | Facility: CLINIC | Age: 79
End: 2024-10-28

## 2025-01-20 NOTE — ED ADULT NURSE NOTE - NSSEPSISNEWALTERMENTAL_ED_A_ED
For prior authorization on: 01/20/2025  Medication: Wegovy  Dosage: 0.25MG/0.5 ML  Quantity requested: 2mL        
No

## 2025-02-07 NOTE — PHYSICAL THERAPY INITIAL EVALUATION ADULT - SITTING BALANCE: STATIC
[de-identified] : I reviewed all her past medical records and noted the contents and there is no interim medical history. [Lower Ext Edema] : lower extremity edema [Under Stress] : under stress [SOB] : no shortness of breath [Dyspnea on exertion] : not dyspnea during exertion [Chest Discomfort] : no chest discomfort [Palpitations] : no palpitations normal balance

## 2025-03-12 NOTE — PROGRESS NOTE ADULT - SUBJECTIVE AND OBJECTIVE BOX
Gastroenterology progress note:     Patient is a 76y old  Female who presents with a chief complaint of Chest pain, anemia, melena (17 Feb 2022 11:13)       Admitted on: 02-16-22    We are following the patient for anemia     Interval History:  Denies any N/V/abd pain  had 2 episodes of dark stool took golytely last night        PAST MEDICAL & SURGICAL HISTORY:  HTN (hypertension)    Heart murmur    Eczema    Anemia    Aortic stenosis    H/O appendicitis    H/O cholecystitis        MEDICATIONS  (STANDING):  calcium gluconate IVPB 2 Gram(s) IV Intermittent once  dextrose 50% Injectable 50 milliLiter(s) IV Push once  insulin regular  human recombinant 10 Unit(s) IV Push once  metoprolol tartrate 25 milliGRAM(s) Oral two times a day  pantoprazole  Injectable 40 milliGRAM(s) IV Push two times a day  polyethylene glycol 3350 17 Gram(s) Oral daily  senna 2 Tablet(s) Oral at bedtime  sodium zirconium cyclosilicate 10 Gram(s) Oral daily    MEDICATIONS  (PRN):  aluminum hydroxide/magnesium hydroxide/simethicone Suspension 30 milliLiter(s) Oral every 4 hours PRN Dyspepsia  melatonin 3 milliGRAM(s) Oral at bedtime PRN Insomnia      Allergies  aspirin (Rash)  latex (Unknown)  penicillins (Unknown)      Review of Systems:   Cardiovascular:  No Chest Pain, No Palpitations  Respiratory:  No Cough, No Dyspnea  Gastrointestinal:  As described in HPI    Physical Examination:  T(C): 35.6 (02-17-22 @ 11:39), Max: 36.1 (02-17-22 @ 05:00)  HR: 63 (02-17-22 @ 11:39) (61 - 73)  BP: 133/59 (02-17-22 @ 11:39) (99/45 - 138/63)  RR: 18 (02-17-22 @ 11:39) (17 - 18)  SpO2: 100% (02-17-22 @ 02:35) (96% - 100%)      02-16-22 @ 07:01  -  02-17-22 @ 07:00  --------------------------------------------------------  IN: 268 mL / OUT: 601 mL / NET: -333 mL      Constitutional: No acute distress.  Respiratory:  No signs of respiratory distress. Lung sounds are clear bilaterally.  Cardiovascular:  S1 S2, Regular rate and rhythm.  Abdominal: Abdomen is soft, symmetric, and non-tender without distention.    Skin: No rashes, No Jaundice.        Data:                        8.9    6.22  )-----------( 119      ( 17 Feb 2022 04:30 )             28.7     Hgb trend:  8.9  02-17-22 @ 04:30  6.9  02-16-22 @ 16:00  7.4  02-16-22 @ 05:43  6.5  02-15-22 @ 19:09      02-16-22 @ 07:01  -  02-17-22 @ 07:00  --------------------------------------------------------  IN: 268 mL      02-17    139  |  108  |  55<H>  ----------------------------<  88  6.2<HH>   |  17  |  2.6<H>    Ca    8.8      17 Feb 2022 04:30  Mg     2.4     02-17    TPro  6.2  /  Alb  3.6  /  TBili  1.3<H>  /  DBili  x   /  AST  22  /  ALT  6   /  AlkPhos  82  02-17    Liver panel trend:  TBili 1.3   /   AST 22   /   ALT 6   /   AlkP 82   /   Tptn 6.2   /   Alb 3.6    /   DBili --      02-17  TBili 1.1   /   AST 15   /   ALT 7   /   AlkP 91   /   Tptn 6.8   /   Alb 3.7    /   DBili --      02-16  TBili 1.1   /   AST 24   /   ALT 7   /   AlkP 81   /   Tptn 5.9   /   Alb 3.3    /   DBili --      02-15      PT/INR - ( 15 Feb 2022 21:13 )   PT: 12.30 sec;   INR: 1.07 ratio         PTT - ( 15 Feb 2022 21:13 )  PTT:35.5 sec       Radiology:    US Abdomen Upper Quadrant Right:   ACC: [id number] EXAM:  US ABDOMEN RT UPR QUADRANT                          PROCEDURE DATE:  02/16/2022          INTERPRETATION:  CLINICAL INFORMATION: Abdominal pain    COMPARISON: CT abdomen pelvis dated 1/17/2022    TECHNIQUE: Sonography of the right upper quadrant.    FINDINGS:    Liver: Imaged portions appear unremarkable  Bile ducts: Common bile duct measures 4 mm, nondilated.  Gallbladder: Cholecystectomy.  Pancreas: Visualized portions are within normal limits.  Right kidney: 10.1 cm. No hydronephrosis.  Ascites: None.    IMPRESSION:    Status post cholecystectomy. Nondilated CBD.    --- End of Report ---            GUTIERREZ GRIGGS MD; Attending Radiologist  This document has been electronically signed. Feb 16 2022  8:33AM (02-16-22 @ 08:26)     no

## 2025-04-07 NOTE — PHYSICAL THERAPY INITIAL EVALUATION ADULT - BED MOBILITY LIMITATIONS, REHAB EVAL
Satisfactory
decreased ability to use arms for pushing/pulling/impaired ability to control trunk for mobility

## 2025-05-16 NOTE — ED PROVIDER NOTE - EKG ADDITIONAL QUESTION - PERFORMED INDEPENDENT VISUALIZATION
Brief Postoperative Note      Patient: Davis Payton  YOB: 1972  MRN: [id number]    Date of Procedure: 5/16/2025    Pre-Op Diagnosis Codes:      * Abscess of abdominal wall [L02.211]    Post-Op Diagnosis: Same       Procedure(s):  EXCISIONAL DEBRIDEMENT ABDOMEN WALL ABSCESS skin and subcutaneous tissue, 7 x 6 cm    Surgeon(s):  Hleen Reyes MD    Assistant:  Surgical Assistant: Janusz Gerard    Anesthesia: Monitor Anesthesia Care    Estimated Blood Loss (mL): less than 50     Complications: None    Specimens:   ID Type Source Tests Collected by Time Destination   1 : 1) Abdominal wall abscess culture swab Tissue Tissue CULTURE, FUNGUS, CULTURE, TISSUE (WITH GRAM STAIN), CULTURE WITH SMEAR, ACID FAST BACILLIUS Helen Reyes MD 5/16/2025 1007    A : A) Abdominal subqutanious tissue Tissue Tissue SURGICAL PATHOLOGY Helen Reyes MD 5/16/2025 1010        Implants:  * No implants in log *      Drains:   Open Drain 05/16/25 RLQ (Active)   Site Description Clean, dry & intact 05/16/25 1027   Dressing Status New dressing applied 05/16/25 1027   Status Unclamped 05/16/25 1027       Findings:  Infection Present At Time Of Surgery (PATOS) (choose all levels that have infection present):  - Superficial Infection (skin/subcutaneous) present as evidenced by fluid consistent with infection  Other Findings: chronic abscess abdominal wall    Electronically signed by HELEN REYES MD on 5/16/2025 at 10:29 AM   Yes

## 2025-07-07 NOTE — PHYSICAL THERAPY INITIAL EVALUATION ADULT - NAME OF CLINICIAN
Lab Results:  CBC  CBC Full  -  ( 2025 04:45 )  WBC Count : 12.31 K/uL  RBC Count : 5.49 M/uL  Hemoglobin : 15.0 g/dL  Hematocrit : 45.4 %  Platelet Count - Automated : 229 K/uL  Mean Cell Volume : 82.7 fl  Mean Cell Hemoglobin : 27.3 pg  Mean Cell Hemoglobin Concentration : 33.0 g/dL  Auto Neutrophil # : 10.51 K/uL  Auto Lymphocyte # : 1.32 K/uL  Auto Monocyte # : 0.41 K/uL  Auto Eosinophil # : 0.02 K/uL  Auto Basophil # : 0.01 K/uL  Auto Neutrophil % : 85.4 %  Auto Lymphocyte % : 10.7 %  Auto Monocyte % : 3.3 %  Auto Eosinophil % : 0.2 %  Auto Basophil % : 0.1 %    .		Differential:	[] Automated		[] Manual  Chemistry                        15.0   12.31 )-----------( 229      ( 2025 04:45 )             45.4     07-    135  |  103  |  19  ----------------------------<  120[H]  4.5   |  27  |  0.90    Ca    9.1      2025 04:45    TPro  7.0  /  Alb  3.5  /  TBili  0.7  /  DBili  x   /  AST  27  /  ALT  40  /  AlkPhos  125[H]  07-07    LIVER FUNCTIONS - ( 2025 04:45 )  Alb: 3.5 g/dL / Pro: 7.0 g/dL / ALK PHOS: 125 U/L / ALT: 40 U/L / AST: 27 U/L / GGT: x             Urinalysis Basic - ( 2025 04:45 )    Color: Yellow / Appearance: Clear / S.005 / pH: x  Gluc: 120 mg/dL / Ketone: x  / Bili: Negative / Urobili: 1.0 mg/dL   Blood: x / Protein: Negative mg/dL / Nitrite: Negative   Leuk Esterase: Negative / RBC: x / WBC x   Sq Epi: x / Non Sq Epi: x / Bacteria: x            MICROBIOLOGY/CULTURES:      RADIOLOGY RESULTS: KHOA Jordan